# Patient Record
Sex: FEMALE | Race: BLACK OR AFRICAN AMERICAN | NOT HISPANIC OR LATINO | Employment: OTHER | URBAN - METROPOLITAN AREA
[De-identification: names, ages, dates, MRNs, and addresses within clinical notes are randomized per-mention and may not be internally consistent; named-entity substitution may affect disease eponyms.]

---

## 2017-04-07 ENCOUNTER — ALLSCRIPTS OFFICE VISIT (OUTPATIENT)
Dept: OTHER | Facility: OTHER | Age: 62
End: 2017-04-07

## 2018-01-14 VITALS
DIASTOLIC BLOOD PRESSURE: 88 MMHG | BODY MASS INDEX: 32.56 KG/M2 | HEART RATE: 93 BPM | WEIGHT: 178 LBS | SYSTOLIC BLOOD PRESSURE: 160 MMHG | TEMPERATURE: 97.4 F | OXYGEN SATURATION: 97 %

## 2018-02-02 DIAGNOSIS — G89.29 CHRONIC LOW BACK PAIN, UNSPECIFIED BACK PAIN LATERALITY, WITH SCIATICA PRESENCE UNSPECIFIED: Primary | ICD-10-CM

## 2018-02-02 DIAGNOSIS — M54.5 CHRONIC LOW BACK PAIN, UNSPECIFIED BACK PAIN LATERALITY, WITH SCIATICA PRESENCE UNSPECIFIED: Primary | ICD-10-CM

## 2018-02-02 RX ORDER — TRAMADOL HYDROCHLORIDE 50 MG/1
1 TABLET ORAL EVERY 6 HOURS
COMMUNITY
Start: 2014-08-04 | End: 2018-02-02 | Stop reason: SDUPTHER

## 2018-02-02 RX ORDER — TRAMADOL HYDROCHLORIDE 50 MG/1
50 TABLET ORAL EVERY 6 HOURS
Qty: 120 TABLET | Refills: 0 | OUTPATIENT
Start: 2018-02-02 | End: 2018-03-01 | Stop reason: SDUPTHER

## 2018-02-02 NOTE — TELEPHONE ENCOUNTER
Please advise her that Rx is ready  Please advise her that next visit we will need to have controlled substance agreement signed by her as well  Thanks

## 2018-03-01 DIAGNOSIS — G89.29 CHRONIC LOW BACK PAIN, UNSPECIFIED BACK PAIN LATERALITY, WITH SCIATICA PRESENCE UNSPECIFIED: ICD-10-CM

## 2018-03-01 DIAGNOSIS — M54.5 CHRONIC LOW BACK PAIN, UNSPECIFIED BACK PAIN LATERALITY, WITH SCIATICA PRESENCE UNSPECIFIED: ICD-10-CM

## 2018-03-05 RX ORDER — TRAMADOL HYDROCHLORIDE 50 MG/1
50 TABLET ORAL EVERY 6 HOURS
Qty: 120 TABLET | Refills: 0 | OUTPATIENT
Start: 2018-03-05 | End: 2018-04-02 | Stop reason: SDUPTHER

## 2018-03-05 NOTE — TELEPHONE ENCOUNTER
Rx called in and left message with a male at patient's home to inform patient that her Rx was called in

## 2018-03-22 ENCOUNTER — TELEPHONE (OUTPATIENT)
Dept: FAMILY MEDICINE CLINIC | Facility: OTHER | Age: 63
End: 2018-03-22

## 2018-03-29 DIAGNOSIS — I10 ESSENTIAL HYPERTENSION: Primary | ICD-10-CM

## 2018-04-02 DIAGNOSIS — I10 ESSENTIAL HYPERTENSION: Primary | ICD-10-CM

## 2018-04-02 DIAGNOSIS — G89.29 CHRONIC LOW BACK PAIN, UNSPECIFIED BACK PAIN LATERALITY, WITH SCIATICA PRESENCE UNSPECIFIED: ICD-10-CM

## 2018-04-02 DIAGNOSIS — M54.5 CHRONIC LOW BACK PAIN, UNSPECIFIED BACK PAIN LATERALITY, WITH SCIATICA PRESENCE UNSPECIFIED: ICD-10-CM

## 2018-04-02 RX ORDER — AMLODIPINE BESYLATE 10 MG/1
TABLET ORAL
Qty: 30 TABLET | Refills: 6 | Status: SHIPPED | OUTPATIENT
Start: 2018-04-02 | End: 2018-09-17 | Stop reason: SDUPTHER

## 2018-04-02 RX ORDER — AMLODIPINE BESYLATE 10 MG/1
1 TABLET ORAL DAILY
COMMUNITY
Start: 2012-03-30 | End: 2018-04-02 | Stop reason: SDUPTHER

## 2018-04-02 RX ORDER — AMLODIPINE BESYLATE 10 MG/1
10 TABLET ORAL DAILY
Qty: 30 TABLET | Refills: 0 | Status: SHIPPED | OUTPATIENT
Start: 2018-04-02 | End: 2018-04-02 | Stop reason: SDUPTHER

## 2018-04-02 NOTE — TELEPHONE ENCOUNTER
Please advise the patient I have refilled 30 days of Norvasc due to she needs blood work and office visit for routine follow up  Please set up a visit in the coming weeks for HTN follow up  Thanks

## 2018-04-03 RX ORDER — TRAMADOL HYDROCHLORIDE 50 MG/1
TABLET ORAL
Qty: 120 TABLET | Refills: 0 | OUTPATIENT
Start: 2018-04-03 | End: 2018-05-02 | Stop reason: SDUPTHER

## 2018-04-11 ENCOUNTER — OFFICE VISIT (OUTPATIENT)
Dept: FAMILY MEDICINE CLINIC | Facility: OTHER | Age: 63
End: 2018-04-11
Payer: COMMERCIAL

## 2018-04-11 VITALS
BODY MASS INDEX: 31.97 KG/M2 | SYSTOLIC BLOOD PRESSURE: 144 MMHG | OXYGEN SATURATION: 95 % | HEART RATE: 120 BPM | WEIGHT: 169.31 LBS | DIASTOLIC BLOOD PRESSURE: 88 MMHG | TEMPERATURE: 98.7 F | HEIGHT: 61 IN

## 2018-04-11 DIAGNOSIS — M54.16 LUMBAR RADICULOPATHY: ICD-10-CM

## 2018-04-11 DIAGNOSIS — G89.29 CHRONIC MIDLINE LOW BACK PAIN WITH LEFT-SIDED SCIATICA: ICD-10-CM

## 2018-04-11 DIAGNOSIS — M51.36 DISC DEGENERATION, LUMBAR: ICD-10-CM

## 2018-04-11 DIAGNOSIS — Z12.11 SCREENING FOR COLON CANCER: Primary | ICD-10-CM

## 2018-04-11 DIAGNOSIS — I10 BENIGN ESSENTIAL HYPERTENSION: ICD-10-CM

## 2018-04-11 DIAGNOSIS — M54.42 CHRONIC MIDLINE LOW BACK PAIN WITH LEFT-SIDED SCIATICA: ICD-10-CM

## 2018-04-11 DIAGNOSIS — F32.A ANXIETY AND DEPRESSION: ICD-10-CM

## 2018-04-11 DIAGNOSIS — F41.9 ANXIETY AND DEPRESSION: ICD-10-CM

## 2018-04-11 PROCEDURE — 99214 OFFICE O/P EST MOD 30 MIN: CPT | Performed by: FAMILY MEDICINE

## 2018-04-11 RX ORDER — LISINOPRIL 20 MG/1
1 TABLET ORAL 2 TIMES DAILY
COMMUNITY
Start: 2011-07-08 | End: 2018-05-02 | Stop reason: SDUPTHER

## 2018-04-11 RX ORDER — DULOXETIN HYDROCHLORIDE 30 MG/1
30 CAPSULE, DELAYED RELEASE ORAL DAILY
Qty: 30 CAPSULE | Refills: 0 | Status: SHIPPED | OUTPATIENT
Start: 2018-04-11 | End: 2018-04-11 | Stop reason: SDUPTHER

## 2018-04-11 RX ORDER — DULOXETIN HYDROCHLORIDE 30 MG/1
30 CAPSULE, DELAYED RELEASE ORAL DAILY
Qty: 30 CAPSULE | Refills: 1 | Status: SHIPPED | OUTPATIENT
Start: 2018-04-11 | End: 2018-07-11

## 2018-04-11 RX ORDER — ALPRAZOLAM 1 MG/1
1 TABLET ORAL EVERY 8 HOURS PRN
COMMUNITY
Start: 2011-06-10 | End: 2018-07-11

## 2018-04-11 RX ORDER — GABAPENTIN 300 MG/1
1 CAPSULE ORAL 3 TIMES DAILY
COMMUNITY
End: 2020-04-08

## 2018-04-11 NOTE — PATIENT INSTRUCTIONS
DASH Eating Plan   AMBULATORY CARE:   The DASH (Dietary Approaches to Stop Hypertension) Eating Plan  is designed to help prevent or lower high blood pressure  It can also help to lower LDL (bad) cholesterol and decrease your risk for heart disease  The plan is low in sodium, sugar, unhealthy fats, and total fat  It is high in potassium, calcium, magnesium, and fiber  These nutrients are added when you eat more fruits, vegetables, and whole grains  Your sodium limit each day: Your dietitian will tell you how much sodium is safe for you to have each day  People with high blood pressure should have no more than 1,500 to 2,300 mg of sodium in a day  A teaspoon (tsp) of salt has 2,300 mg of sodium  This may seem like a difficult goal, but small changes to the foods you eat can make a big difference  Your healthcare provider or dietitian can help you create a meal plan that follows your sodium limit  How to limit sodium:   · Read food labels  Food labels can help you choose foods that are low in sodium  The amount of sodium is listed in milligrams (mg)  The % Daily Value (DV) column tells you how much of your daily needs are met by 1 serving of the food for each nutrient listed  Choose foods that have less than 5% of the DV of sodium  These foods are considered low in sodium  Foods that have 20% or more of the DV of sodium are considered high in sodium  Avoid foods that have more than 300 mg of sodium in each serving  Choose foods that say low-sodium, reduced-sodium, or no salt added on the food label  · Avoid salt  Do not salt food at the table, and add very little salt to foods during cooking  Use herbs and spices, such as onions, garlic, and salt-free seasonings to add flavor to foods  Try lemon or lime juice or vinegar to give foods a tart flavor  Use hot peppers or a small amount of hot pepper sauce to add a spicy flavor to foods  · Ask about salt substitutes    Ask your healthcare provider if you may use salt substitutes  Some salt substitutes have ingredients that can be harmful if you have certain health conditions  · Choose foods carefully at restaurants  Meals from restaurants, especially fast food restaurants, are often high in sodium  Some restaurants have nutrition information that tells you the amount of sodium in their foods  Ask to have your food prepared with less, or no salt  What you need to know about fats:   · Include healthy fats  Examples are unsaturated fats and omega-3 fatty acids  Unsaturated fats are found in soybean, canola, olive, or sunflower oil, and liquid and soft tub margarines  Omega-3 fatty acids are found in fatty fish, such as salmon, tuna, mackerel, and sardines  It is also found in flaxseed oil and ground flaxseed  · Avoid unhealthy fats  Do not eat unhealthy fats, such as saturated fats and trans fats  Saturated fats are found in foods that contain fat from animals  Examples are fatty meats, whole milk, butter, cream, and other dairy foods  It is also found in shortening, stick margarine, palm oil, and coconut oil  Trans fats are found in fried foods, crackers, chips, and baked goods made with margarine or shortening  Foods to include: With the DASH eating plan, you need to eat a certain number of servings from each food group  This will help you get enough of certain nutrients and limit others  The amount of servings you should eat depends on how many calories you need  Your dietitian can tell you how many calories you need  The number of servings listed next to the food groups below are for people who need about 2,000 calories each day    · Grains:  6 to 8 servings (3 of these servings should be whole-grain foods)    ¨ 1 slice of whole-grain bread     ¨ 1 ounce of dry cereal    ¨ ½ cup of cooked cereal, pasta, or brown rice    · Vegetables and fruits:  4 to 5 servings of fruits and 4 to 5 servings of vegetables    ¨ 1 medium fruit    ¨ ½ cup of frozen, canned (no added salt), or chopped fresh vegetables     ¨ ½ cup of fresh, frozen, dried, or canned fruit (canned in light syrup or fruit juice)    ¨ ½ cup of vegetable or fruit juice    · Dairy:  2 to 3 servings    ¨ 1 cup of nonfat (skim) or 1% milk    ¨ 1½ ounces of fat-free or low-fat cheese    ¨ 6 ounces of nonfat or low-fat yogurt    · Lean meat, poultry, and fish:  6 ounces or less    [employer] (chicken, turkey) with no skin    ¨ Fish (especially fatty fish, such as salmon, fresh tuna, or mackerel)    ¨ Lean beef and pork (loin, round, extra lean hamburger)    ¨ Egg whites and egg substitutes    · Nuts, seeds, and legumes:  4 to 5 servings each week    ¨ ½ cup of cooked beans and peas    ¨ 1½ ounces of unsalted nuts    ¨ 2 tablespoons of peanut butter or seeds    · Sweets and added sugars:  5 or less each week    ¨ 1 tablespoon of sugar, jelly, or jam    ¨ ½ cup of sorbet or gelatin    ¨ 1 cup of lemonade    · Fats:  2 to 3 servings each week    ¨ 1 teaspoon of soft margarine or vegetable oil    ¨ 1 tablespoon of mayonnaise    ¨ 2 tablespoons of salad dressing  Foods to avoid:   · Grains:      [employer], such as doughnuts, pastries, cookies, and biscuits (high in fat and sugar)    ¨ Mixes for cornbread and biscuits, packaged foods, such as bread stuffing, rice and pasta mixes, macaroni and cheese, and instant cereals (high in sodium)    · Fruits and vegetables:      ¨ Regular, canned vegetables (high in sodium)    ¨ Sauerkraut, pickled vegetables, and other foods prepared in brine (high in sodium)    ¨ Fried vegetables or vegetables in butter or high-fat sauces    ¨ Fruit in cream or butter sauce (high in fat)    · Dairy:      ¨ Whole milk, 2% milk, and cream (high in fat)    ¨ Regular cheese and processed cheese (high in fat and sodium)    · Meats and protein foods:      ¨ Smoked or cured meat, such as corned beef, cooper, ham, hot dogs, and sausage (high in fat and sodium)    ¨ Canned beans and canned meats or spreads, such as potted meats, sardines, anchovies, and imitation seafood (high in sodium)    ¨ Deli or lunch meats, such as bologna, ham, turkey, and roast beef (high in sodium)    ¨ High-fat meat (T-bone steak, regular hamburger, and ribs)    ¨ Whole eggs and egg yolks (high in fat)    · Other:      ¨ Seasonings made with salt, such as garlic salt, celery salt, onion salt, seasoned salt, meat tenderizers, and monosodium glutamate (MSG)    ¨ Miso soup and canned or dried soup mixes (high in sodium)    ¨ Regular soy sauce, barbecue sauce, teriyaki sauce, steak sauce, Worcestershire sauce, and most flavored vinegars (high in sodium)    ¨ Regular condiments, such as mustard, ketchup, and salad dressings (high in sodium)    ¨ Gravy and sauces, such as Gregory or cheese sauces (high in sodium and fat)    ¨ Drinks high in sugar, such as soda or fruit drinks    [employer] foods, such as salted chips, popcorn, pretzels, pork rinds, salted crackers, and salted nuts    ¨ Frozen foods, such as dinners, entrees, vegetables with sauces, and breaded meats (high in sodium)  Other guidelines to follow:   · Maintain a healthy weight  Your risk for heart disease is higher if you are overweight  Your healthcare provider may suggest that you lose weight if you are overweight  You can lose weight by eating fewer calories and foods that have added sugars and fat  The DASH meal plan can help you do this  Decrease calories by eating smaller portions at each meal and fewer snacks  Ask your healthcare provider for more information about how to lose weight  · Exercise regularly  Regular exercise can help you reach or maintain a healthy weight  Regular exercise can also help decrease your blood pressure and improve your cholesterol levels  Get 30 minutes or more of moderate exercise each day of the week  To lose weight, get at least 60 minutes of exercise  Talk to your healthcare provider about the best exercise program for you      · Limit alcohol  Women should limit alcohol to 1 drink a day  Men should limit alcohol to 2 drinks a day  A drink of alcohol is 12 ounces of beer, 5 ounces of wine, or 1½ ounces of liquor  © 2017 [address] [employer] Information is for End User's use only and may not be sold, redistributed or otherwise used for commercial purposes  All illustrations and images included in CareNotes® are the copyrighted property of [employer]  or [employer]  The above information is an  only  It is not intended as medical advice for individual conditions or treatments  Talk to your doctor, nurse or pharmacist before following any medical regimen to see if it is safe and effective for you

## 2018-04-11 NOTE — PROGRESS NOTES
Assessment/Plan:           Problem List Items Addressed This Visit     Backache    Relevant Orders    MRI lumbar spine wo contrast    CBC and differential    Comprehensive metabolic panel    TSH, 3rd generation with T4 reflex    Vitamin D 25 hydroxy    Benign essential hypertension    Relevant Medications    lisinopril (ZESTRIL) 20 mg tablet    diltiazem (CARDIZEM LA) 120 MG 24 hr tablet    Other Relevant Orders    CBC and differential    Comprehensive metabolic panel    TSH, 3rd generation with T4 reflex    Vitamin D 25 hydroxy    Disc degeneration, lumbar    Relevant Orders    MRI lumbar spine wo contrast    Lumbar radiculopathy    Relevant Orders    MRI lumbar spine wo contrast      Other Visit Diagnoses     Screening for colon cancer    -  Primary    Relevant Orders    Ambulatory referral to Gastroenterology    Anxiety and depression        Relevant Medications    ALPRAZolam (XANAX) 1 mg tablet    DULoxetine (CYMBALTA) 30 mg delayed release capsule    Other Relevant Orders    CBC and differential    Comprehensive metabolic panel    TSH, 3rd generation with T4 reflex    Vitamin D 25 hydroxy            Subjective:      Patient ID: Kali Mcclain is a 57 y o  female  Patient is here for eval of the chronic pain in the lower back  Hypertension   Patient is a 57 y o  female who presents for follow-up of hypertension  Her high blood pressure was first noted several years ago  Home blood pressure readings: not doing  Salt intake and diet: salt not added to cooking  Usual weight: stable  Associated signs and symptoms: none  Patient denies: blurred vision, chest pain, dyspnea and headache  Use of agents associated with hypertension: none  Medication compliance: taking as prescribed  Chronic low back pain due to DDD:  She has had the imaging studies several years ago and feels the back pain is much worse now    She feels very sad and tearful and states taking all the pain medication she still has days when she can't do much and feels very sad  She has been following with pain management regularly  She is asking to have a repeat MRI for sure  She just wants to know if there is any changes to her lower back  Other issue is depression associated with the chronic pain  She is very tearful and is asking if there is any medication that will help her  She feels very down and tearful but not suicidal   No thoughts on hurting herself or others  She does have family and they are supportive  She is not taking any medication currently for depression  We discussed about options and she agrees to start Cymbalta for a trial   She has anxiety as well and takes Alprazolam and wants to continue with this medication  We had her sign the agreement for the medication today and she fully agreed with the agreement terms  The following portions of the patient's history were reviewed and updated as appropriate: allergies, current medications, past family history, past medical history, past social history, past surgical history and problem list     Review of Systems   Constitutional: Positive for fatigue  Negative for appetite change, chills, fever and unexpected weight change  Respiratory: Negative for cough  Cardiovascular: Negative for chest pain and leg swelling  Gastrointestinal: Negative for abdominal pain, nausea and vomiting  Genitourinary: Negative for dysuria and flank pain  Musculoskeletal: Positive for arthralgias, back pain and myalgias  Neurological: Positive for numbness  Negative for dizziness, weakness and light-headedness  Psychiatric/Behavioral: Positive for decreased concentration and dysphoric mood  Negative for self-injury and suicidal ideas  The patient is nervous/anxious            Objective:      /88 (BP Location: Left arm, Patient Position: Sitting, Cuff Size: Adult)   Pulse (!) 120   Temp 98 7 °F (37 1 °C) (Tympanic)   Ht 5' 0 5" (1 537 m)   Wt 76 8 kg (169 lb 5 oz) SpO2 95%   BMI 32 52 kg/m²          Physical Exam   Constitutional: She is oriented to person, place, and time  She appears well-developed and well-nourished  No distress  HENT:   Head: Normocephalic and atraumatic  Eyes: Conjunctivae are normal  Right eye exhibits no discharge  Left eye exhibits no discharge  No scleral icterus  Neck: Normal range of motion  Neck supple  No thyromegaly present  Cardiovascular: Normal rate, regular rhythm and normal heart sounds  No murmur heard  Pulmonary/Chest: Effort normal and breath sounds normal  No respiratory distress  She has no wheezes  Abdominal: Soft  Bowel sounds are normal  She exhibits no distension  There is no tenderness  Lymphadenopathy:     She has no cervical adenopathy  Neurological: She is alert and oriented to person, place, and time  No cranial nerve deficit  Skin: Skin is warm and dry  No rash noted  She is not diaphoretic  Psychiatric: Her behavior is normal    Mood is tearful and sad    Nursing note and vitals reviewed        Lab Results   Component Value Date    WBC 6 45 09/30/2015    HGB 13 0 09/30/2015    HCT 40 0 09/30/2015     09/30/2015    CHOL 199 09/30/2015    TRIG 83 09/30/2015    HDL 63 09/30/2015    ALT 22 09/30/2015    AST 13 09/30/2015     09/30/2015    K 4 4 09/30/2015     (H) 09/30/2015    CREATININE 1 06 09/30/2015    BUN 14 09/30/2015    CO2 27 0 09/30/2015

## 2018-04-16 ENCOUNTER — TELEPHONE (OUTPATIENT)
Dept: FAMILY MEDICINE CLINIC | Facility: OTHER | Age: 63
End: 2018-04-16

## 2018-04-16 NOTE — TELEPHONE ENCOUNTER
----- Message from Tete Bowens MD sent at 4/13/2018 11:38 AM EDT -----  Please have patient return for blood work  The lab is ordered as part of her visit  Thanks  She can do this lab non-fasting if she wants

## 2018-05-02 DIAGNOSIS — G89.29 CHRONIC LOW BACK PAIN, UNSPECIFIED BACK PAIN LATERALITY, WITH SCIATICA PRESENCE UNSPECIFIED: ICD-10-CM

## 2018-05-02 DIAGNOSIS — M54.5 CHRONIC LOW BACK PAIN, UNSPECIFIED BACK PAIN LATERALITY, WITH SCIATICA PRESENCE UNSPECIFIED: ICD-10-CM

## 2018-05-02 DIAGNOSIS — I10 ESSENTIAL HYPERTENSION: Primary | ICD-10-CM

## 2018-05-02 RX ORDER — TRAMADOL HYDROCHLORIDE 50 MG/1
TABLET ORAL
Qty: 120 TABLET | Refills: 0 | OUTPATIENT
Start: 2018-05-02 | End: 2018-05-31 | Stop reason: SDUPTHER

## 2018-05-02 RX ORDER — LISINOPRIL 20 MG/1
TABLET ORAL
Qty: 60 TABLET | Refills: 1 | Status: SHIPPED | OUTPATIENT
Start: 2018-05-02 | End: 2018-05-17 | Stop reason: SDUPTHER

## 2018-05-10 ENCOUNTER — TELEPHONE (OUTPATIENT)
Dept: GASTROENTEROLOGY | Facility: AMBULARY SURGERY CENTER | Age: 63
End: 2018-05-10

## 2018-05-10 NOTE — TELEPHONE ENCOUNTER
called patient  Telephone number is now saying it is non exsistent    I have sent patient a letter today requesting she calls in to make an appointment

## 2018-05-17 DIAGNOSIS — I10 ESSENTIAL HYPERTENSION: ICD-10-CM

## 2018-05-17 RX ORDER — LISINOPRIL 20 MG/1
TABLET ORAL
Qty: 60 TABLET | Refills: 6 | Status: SHIPPED | OUTPATIENT
Start: 2018-05-17 | End: 2018-09-17

## 2018-05-31 DIAGNOSIS — M54.5 CHRONIC LOW BACK PAIN, UNSPECIFIED BACK PAIN LATERALITY, WITH SCIATICA PRESENCE UNSPECIFIED: ICD-10-CM

## 2018-05-31 DIAGNOSIS — G89.29 CHRONIC LOW BACK PAIN, UNSPECIFIED BACK PAIN LATERALITY, WITH SCIATICA PRESENCE UNSPECIFIED: ICD-10-CM

## 2018-05-31 RX ORDER — TRAMADOL HYDROCHLORIDE 50 MG/1
TABLET ORAL
Qty: 120 TABLET | Refills: 0 | OUTPATIENT
Start: 2018-05-31 | End: 2018-06-29 | Stop reason: SDUPTHER

## 2018-06-29 DIAGNOSIS — M54.5 CHRONIC LOW BACK PAIN, UNSPECIFIED BACK PAIN LATERALITY, WITH SCIATICA PRESENCE UNSPECIFIED: ICD-10-CM

## 2018-06-29 DIAGNOSIS — G89.29 CHRONIC LOW BACK PAIN, UNSPECIFIED BACK PAIN LATERALITY, WITH SCIATICA PRESENCE UNSPECIFIED: ICD-10-CM

## 2018-07-02 DIAGNOSIS — G89.29 CHRONIC LOW BACK PAIN, UNSPECIFIED BACK PAIN LATERALITY, WITH SCIATICA PRESENCE UNSPECIFIED: ICD-10-CM

## 2018-07-02 DIAGNOSIS — M54.5 CHRONIC LOW BACK PAIN, UNSPECIFIED BACK PAIN LATERALITY, WITH SCIATICA PRESENCE UNSPECIFIED: ICD-10-CM

## 2018-07-02 RX ORDER — TRAMADOL HYDROCHLORIDE 50 MG/1
TABLET ORAL
Qty: 120 TABLET | Refills: 0 | OUTPATIENT
Start: 2018-07-02 | End: 2018-07-30 | Stop reason: SDUPTHER

## 2018-07-02 RX ORDER — TRAMADOL HYDROCHLORIDE 50 MG/1
TABLET ORAL
Qty: 120 TABLET | Refills: 0 | OUTPATIENT
Start: 2018-07-02

## 2018-07-11 ENCOUNTER — OFFICE VISIT (OUTPATIENT)
Dept: FAMILY MEDICINE CLINIC | Facility: OTHER | Age: 63
End: 2018-07-11
Payer: COMMERCIAL

## 2018-07-11 VITALS
HEIGHT: 61 IN | OXYGEN SATURATION: 96 % | WEIGHT: 164.5 LBS | SYSTOLIC BLOOD PRESSURE: 136 MMHG | DIASTOLIC BLOOD PRESSURE: 88 MMHG | HEART RATE: 96 BPM | BODY MASS INDEX: 31.06 KG/M2 | TEMPERATURE: 97 F

## 2018-07-11 DIAGNOSIS — F30.9 BIPOLAR I DISORDER, SINGLE MANIC EPISODE (HCC): ICD-10-CM

## 2018-07-11 DIAGNOSIS — Z12.11 SCREENING FOR COLON CANCER: ICD-10-CM

## 2018-07-11 DIAGNOSIS — B35.4 TINEA CORPORIS: ICD-10-CM

## 2018-07-11 DIAGNOSIS — I10 BENIGN ESSENTIAL HYPERTENSION: Primary | ICD-10-CM

## 2018-07-11 DIAGNOSIS — Z12.39 BREAST CANCER SCREENING: ICD-10-CM

## 2018-07-11 DIAGNOSIS — E78.2 MIXED HYPERLIPIDEMIA: ICD-10-CM

## 2018-07-11 PROCEDURE — 99214 OFFICE O/P EST MOD 30 MIN: CPT | Performed by: FAMILY MEDICINE

## 2018-07-11 PROCEDURE — 3075F SYST BP GE 130 - 139MM HG: CPT | Performed by: FAMILY MEDICINE

## 2018-07-11 PROCEDURE — 3079F DIAST BP 80-89 MM HG: CPT | Performed by: FAMILY MEDICINE

## 2018-07-11 RX ORDER — ALPRAZOLAM 0.25 MG/1
0.25 TABLET ORAL 3 TIMES DAILY PRN
Refills: 0 | COMMUNITY
Start: 2018-06-22 | End: 2019-10-16

## 2018-07-11 RX ORDER — KETOCONAZOLE 20 MG/G
CREAM TOPICAL 2 TIMES DAILY
Qty: 15 G | Refills: 0 | Status: SHIPPED | OUTPATIENT
Start: 2018-07-11 | End: 2019-10-16

## 2018-07-12 ENCOUNTER — APPOINTMENT (OUTPATIENT)
Dept: LAB | Facility: CLINIC | Age: 63
End: 2018-07-12
Payer: COMMERCIAL

## 2018-07-12 DIAGNOSIS — E78.2 MIXED HYPERLIPIDEMIA: ICD-10-CM

## 2018-07-12 DIAGNOSIS — I10 BENIGN ESSENTIAL HYPERTENSION: ICD-10-CM

## 2018-07-12 LAB
ALBUMIN SERPL BCP-MCNC: 3.5 G/DL (ref 3.5–5)
ALP SERPL-CCNC: 84 U/L (ref 46–116)
ALT SERPL W P-5'-P-CCNC: 16 U/L (ref 12–78)
ANION GAP SERPL CALCULATED.3IONS-SCNC: 9 MMOL/L (ref 4–13)
AST SERPL W P-5'-P-CCNC: 13 U/L (ref 5–45)
BILIRUB SERPL-MCNC: 0.2 MG/DL (ref 0.2–1)
BUN SERPL-MCNC: 16 MG/DL (ref 5–25)
CALCIUM SERPL-MCNC: 9.8 MG/DL (ref 8.3–10.1)
CHLORIDE SERPL-SCNC: 104 MMOL/L (ref 100–108)
CHOLEST SERPL-MCNC: 197 MG/DL (ref 50–200)
CO2 SERPL-SCNC: 27 MMOL/L (ref 21–32)
CREAT SERPL-MCNC: 1.28 MG/DL (ref 0.6–1.3)
ERYTHROCYTE [DISTWIDTH] IN BLOOD BY AUTOMATED COUNT: 16 % (ref 11.6–15.1)
GFR SERPL CREATININE-BSD FRML MDRD: 51 ML/MIN/1.73SQ M
GLUCOSE P FAST SERPL-MCNC: 98 MG/DL (ref 65–99)
HCT VFR BLD AUTO: 41.9 % (ref 34.8–46.1)
HDLC SERPL-MCNC: 51 MG/DL (ref 40–60)
HGB BLD-MCNC: 13.3 G/DL (ref 11.5–15.4)
LDLC SERPL CALC-MCNC: 129 MG/DL (ref 0–100)
MCH RBC QN AUTO: 29.9 PG (ref 26.8–34.3)
MCHC RBC AUTO-ENTMCNC: 31.7 G/DL (ref 31.4–37.4)
MCV RBC AUTO: 94 FL (ref 82–98)
NONHDLC SERPL-MCNC: 146 MG/DL
PLATELET # BLD AUTO: 310 THOUSANDS/UL (ref 149–390)
PMV BLD AUTO: 9.9 FL (ref 8.9–12.7)
POTASSIUM SERPL-SCNC: 4.3 MMOL/L (ref 3.5–5.3)
PROT SERPL-MCNC: 9.1 G/DL (ref 6.4–8.2)
RBC # BLD AUTO: 4.45 MILLION/UL (ref 3.81–5.12)
SODIUM SERPL-SCNC: 140 MMOL/L (ref 136–145)
TRIGL SERPL-MCNC: 87 MG/DL
WBC # BLD AUTO: 9.39 THOUSAND/UL (ref 4.31–10.16)

## 2018-07-12 PROCEDURE — 80053 COMPREHEN METABOLIC PANEL: CPT

## 2018-07-12 PROCEDURE — 80061 LIPID PANEL: CPT

## 2018-07-12 PROCEDURE — 85027 COMPLETE CBC AUTOMATED: CPT

## 2018-07-12 PROCEDURE — 36415 COLL VENOUS BLD VENIPUNCTURE: CPT

## 2018-07-12 NOTE — PROGRESS NOTES
Subjective:      Patient ID: Mary Lawrence is a 57 y o  female  Rash   This is a recurrent problem  The current episode started 1 to 4 weeks ago  The problem is unchanged  The affected locations include the right lower leg  The rash is characterized by redness, itchiness and scaling  She was exposed to nothing  Pertinent negatives include no anorexia, congestion, cough, diarrhea, eye pain, facial edema, fatigue, fever, joint pain, nail changes, rhinorrhea, shortness of breath, sore throat or vomiting  Past treatments include nothing  There is no history of allergies, asthma or eczema  Hypertension   This is a chronic problem  The current episode started more than 1 year ago  The problem is controlled  Associated symptoms include anxiety  Pertinent negatives include no blurred vision, chest pain, headaches, malaise/fatigue, neck pain, orthopnea, palpitations, peripheral edema, PND, shortness of breath or sweats  There are no associated agents to hypertension  Risk factors for coronary artery disease include dyslipidemia, post-menopausal state, obesity, sedentary lifestyle and stress  Past treatments include calcium channel blockers and ACE inhibitors  The current treatment provides moderate improvement  Compliance problems include psychosocial issues and exercise  There is no history of angina, kidney disease, CAD/MI, CVA, heart failure, left ventricular hypertrophy, PVD or retinopathy  There is no history of chronic renal disease, a hypertension causing med or a thyroid problem  Hyperlipidemia   This is a chronic problem  The current episode started more than 1 year ago  The problem is controlled  Exacerbating diseases include obesity  She has no history of chronic renal disease, diabetes, hypothyroidism, liver disease or nephrotic syndrome  There are no known factors aggravating her hyperlipidemia   Pertinent negatives include no chest pain, focal sensory loss, focal weakness, leg pain, myalgias or shortness of breath  Current antihyperlipidemic treatment includes diet change  The current treatment provides mild improvement of lipids  Compliance problems include adherence to exercise and psychosocial issues  Risk factors for coronary artery disease include dyslipidemia, hypertension, obesity, a sedentary lifestyle, stress and post-menopausal        The following portions of the patient's history were reviewed and updated as appropriate: allergies, current medications, past family history, past medical history, past social history, past surgical history and problem list       Current Outpatient Prescriptions:     ALPRAZolam (XANAX) 0 25 mg tablet, Take 0 25 mg by mouth 3 (three) times a day as needed, Disp: , Rfl: 0    amLODIPine (NORVASC) 10 mg tablet, TAKE 1 TABLET BY MOUTH DAILY, Disp: 30 tablet, Rfl: 6    gabapentin (NEURONTIN) 300 mg capsule, Take 1 capsule by mouth 2 (two) times a day, Disp: , Rfl:     lisinopril (ZESTRIL) 20 mg tablet, TAKE 1 TABLET BY MOUTH TWICE A DAY, Disp: 60 tablet, Rfl: 6    traMADol (ULTRAM) 50 mg tablet, Take one tablet 4 times daily as needed, Disp: 120 tablet, Rfl: 0    diltiazem (CARDIZEM LA) 120 MG 24 hr tablet, Take 1 tablet (120 mg total) by mouth daily, Disp: 30 tablet, Rfl: 3    ketoconazole (NIZORAL) 2 % cream, Apply topically 2 (two) times a day, Disp: 15 g, Rfl: 0         Review of Systems   Constitutional: Negative for activity change, fatigue, fever and malaise/fatigue  HENT: Negative for congestion, ear pain, rhinorrhea, sinus pain and sore throat  Eyes: Negative for blurred vision, pain and itching  Respiratory: Negative for cough and shortness of breath  Cardiovascular: Negative for chest pain, palpitations, orthopnea and PND  Gastrointestinal: Negative for abdominal pain, anorexia, constipation, diarrhea, nausea and vomiting  Endocrine: Negative for cold intolerance and heat intolerance  Genitourinary: Negative for dysuria     Musculoskeletal: Negative for joint pain, myalgias and neck pain  Skin: Positive for rash  Negative for color change and nail changes  Neurological: Negative for dizziness, focal weakness, syncope and headaches  Hematological: Negative for adenopathy  Psychiatric/Behavioral: Positive for decreased concentration and dysphoric mood  Negative for behavioral problems, confusion, hallucinations, self-injury, sleep disturbance and suicidal ideas  The patient is nervous/anxious  Objective:      /88 (BP Location: Left arm, Patient Position: Sitting, Cuff Size: Adult)   Pulse 96   Temp (!) 97 °F (36 1 °C) (Tympanic)   Ht 5' 0 5" (1 537 m)   Wt 74 6 kg (164 lb 8 oz)   SpO2 96%   BMI 31 60 kg/m²          Physical Exam   Constitutional: She is oriented to person, place, and time  She appears well-developed and well-nourished  No distress  HENT:   Head: Normocephalic and atraumatic  Right Ear: External ear normal    Left Ear: External ear normal    Nose: Nose normal    Mouth/Throat: Oropharynx is clear and moist    Eyes: Conjunctivae and EOM are normal  Pupils are equal, round, and reactive to light  No scleral icterus  Neck: Normal range of motion  Neck supple  No thyromegaly present  Cardiovascular: Normal rate, regular rhythm and normal heart sounds  Pulmonary/Chest: Effort normal and breath sounds normal  No respiratory distress  Abdominal: Soft  Bowel sounds are normal  There is no tenderness  Musculoskeletal: Normal range of motion  She exhibits no edema  Lymphadenopathy:     She has no cervical adenopathy  Neurological: She is alert and oriented to person, place, and time  No cranial nerve deficit  Skin: Skin is warm and dry  Rash noted  Rash is maculopapular  Rash is not nodular, not pustular and not vesicular  Psychiatric: Her behavior is normal  Judgment normal  Her mood appears anxious  Cognition and memory are normal  She expresses no homicidal and no suicidal ideation  Assessment/Plan:   Diagnoses and all orders for this visit:    Benign essential hypertension  -     diltiazem (CARDIZEM LA) 120 MG 24 hr tablet; Take 1 tablet (120 mg total) by mouth daily  -     Comprehensive metabolic panel; Future  -     Lipid panel; Future  -     CBC; Future    Mixed hyperlipidemia  -     Comprehensive metabolic panel; Future  -     Lipid panel; Future  -     CBC; Future    Bipolar I disorder, single manic episode ([facility] [address] )        -     Not well controlled, unable to tolerate meds        -     Recommend eval with Behavioral Health -- try to set up for appt with Bryant Astorga     Breast cancer screening  -     Mammo screening bilateral w cad; Future    Screening for colon cancer  -     Ambulatory referral to Gastroenterology; Future    Tinea corporis  -     ketoconazole (NIZORAL) 2 % cream; Apply topically 2 (two) times a day    Other orders  -     ALPRAZolam (XANAX) 0 25 mg tablet; Take 0 25 mg by mouth 3 (three) times a day as needed          Stable appearing 58-year-old female with history of hypertension, hyperlipidemia, and bipolar disorder presents for follow-up of chronic conditions  Bipolar disorder not well controlled at this time--will refer for in office behavioral health evaluation later this month  Patient is to continue seeing her regular psychiatrist for medication adjustments  Hypertension and hyperlipidemia appear to be stable at this time  Routine blood work ordered ahead of next visit  No medication changes made today  Rash on right lower extremity appears to be consistent with tinea corporis, therefore will a empirically treat with course of topical ketoconazole cream     RTO approximately 3 months for complete physical exam including Pap smear  The patient indicates understanding of these issues and agrees with the plan        Charity Baez, DO

## 2018-07-16 ENCOUNTER — TELEPHONE (OUTPATIENT)
Dept: FAMILY MEDICINE CLINIC | Facility: OTHER | Age: 63
End: 2018-07-16

## 2018-07-18 ENCOUNTER — TELEPHONE (OUTPATIENT)
Dept: FAMILY MEDICINE CLINIC | Facility: OTHER | Age: 63
End: 2018-07-18

## 2018-07-18 DIAGNOSIS — M54.5 LOW BACK PAIN, UNSPECIFIED BACK PAIN LATERALITY, UNSPECIFIED CHRONICITY, WITH SCIATICA PRESENCE UNSPECIFIED: Primary | ICD-10-CM

## 2018-07-18 NOTE — TELEPHONE ENCOUNTER
[facility] called and lidocaine is not covered by insurance, Would like to know if it can be changed to Diclofenac 1 5% sol   Please Advise

## 2018-07-19 ENCOUNTER — TELEPHONE (OUTPATIENT)
Dept: FAMILY MEDICINE CLINIC | Facility: OTHER | Age: 63
End: 2018-07-19

## 2018-07-19 NOTE — TELEPHONE ENCOUNTER
Pt notified     ----- Message from John Gu DO sent at 7/19/2018  2:48 PM EDT -----  Please inform pt that labs are stable -- can review in detail at her next appt    Thanks!   Jonh Gu DO

## 2018-07-30 DIAGNOSIS — G89.29 CHRONIC LOW BACK PAIN, UNSPECIFIED BACK PAIN LATERALITY, WITH SCIATICA PRESENCE UNSPECIFIED: ICD-10-CM

## 2018-07-30 DIAGNOSIS — M54.5 CHRONIC LOW BACK PAIN, UNSPECIFIED BACK PAIN LATERALITY, WITH SCIATICA PRESENCE UNSPECIFIED: ICD-10-CM

## 2018-07-30 RX ORDER — TRAMADOL HYDROCHLORIDE 50 MG/1
TABLET ORAL
Qty: 120 TABLET | Refills: 0 | OUTPATIENT
Start: 2018-07-30 | End: 2018-08-27 | Stop reason: SDUPTHER

## 2018-08-27 DIAGNOSIS — M54.5 CHRONIC LOW BACK PAIN, UNSPECIFIED BACK PAIN LATERALITY, WITH SCIATICA PRESENCE UNSPECIFIED: ICD-10-CM

## 2018-08-27 DIAGNOSIS — G89.29 CHRONIC LOW BACK PAIN, UNSPECIFIED BACK PAIN LATERALITY, WITH SCIATICA PRESENCE UNSPECIFIED: ICD-10-CM

## 2018-08-29 RX ORDER — TRAMADOL HYDROCHLORIDE 50 MG/1
TABLET ORAL
Qty: 120 TABLET | Refills: 0 | Status: SHIPPED | OUTPATIENT
Start: 2018-08-29 | End: 2019-10-16

## 2018-09-14 ENCOUNTER — TELEPHONE (OUTPATIENT)
Dept: FAMILY MEDICINE CLINIC | Facility: OTHER | Age: 63
End: 2018-09-14

## 2018-09-14 NOTE — TELEPHONE ENCOUNTER
Patient called today very upset  She  cannot get into her psychiatrist until Sept 24  She ran out of Alprazalom and Gabapentin after today  She states that she has been calling the psychiatrist (Dr Quigley Grams left several messages and none is returning her calls to fill the prescription She wants to come in to see Dr Estuardo Lechuga next week just to talk until she can get into see Dr Alexandra Toro    I also told her that she needs to see Jaswinder Kaur and that she has missed her apt on 8/7 and that I can make appointment with her but she does not seem to want to make appointment

## 2018-09-17 ENCOUNTER — OFFICE VISIT (OUTPATIENT)
Dept: FAMILY MEDICINE CLINIC | Facility: OTHER | Age: 63
End: 2018-09-17
Payer: COMMERCIAL

## 2018-09-17 VITALS
HEART RATE: 91 BPM | WEIGHT: 161.31 LBS | DIASTOLIC BLOOD PRESSURE: 92 MMHG | BODY MASS INDEX: 30.46 KG/M2 | HEIGHT: 61 IN | OXYGEN SATURATION: 98 % | TEMPERATURE: 100.1 F | SYSTOLIC BLOOD PRESSURE: 168 MMHG

## 2018-09-17 DIAGNOSIS — G89.29 CHRONIC LOW BACK PAIN, UNSPECIFIED BACK PAIN LATERALITY, WITH SCIATICA PRESENCE UNSPECIFIED: ICD-10-CM

## 2018-09-17 DIAGNOSIS — I10 ESSENTIAL HYPERTENSION: ICD-10-CM

## 2018-09-17 DIAGNOSIS — Z53.20 ASSESSMENT EXAMINATION REFUSED: ICD-10-CM

## 2018-09-17 DIAGNOSIS — M54.5 CHRONIC LOW BACK PAIN, UNSPECIFIED BACK PAIN LATERALITY, WITH SCIATICA PRESENCE UNSPECIFIED: ICD-10-CM

## 2018-09-17 DIAGNOSIS — I10 BENIGN ESSENTIAL HYPERTENSION: Primary | ICD-10-CM

## 2018-09-17 PROCEDURE — 1036F TOBACCO NON-USER: CPT | Performed by: FAMILY MEDICINE

## 2018-09-17 PROCEDURE — 99213 OFFICE O/P EST LOW 20 MIN: CPT | Performed by: FAMILY MEDICINE

## 2018-09-17 PROCEDURE — 3008F BODY MASS INDEX DOCD: CPT | Performed by: FAMILY MEDICINE

## 2018-09-17 RX ORDER — AMLODIPINE BESYLATE 10 MG/1
10 TABLET ORAL DAILY
Qty: 30 TABLET | Refills: 1 | Status: SHIPPED | OUTPATIENT
Start: 2018-09-17 | End: 2020-08-09 | Stop reason: SDUPTHER

## 2018-09-17 RX ORDER — AMLODIPINE BESYLATE 10 MG/1
10 TABLET ORAL DAILY
Qty: 30 TABLET | Refills: 3 | Status: CANCELLED | OUTPATIENT
Start: 2018-09-17

## 2018-09-17 RX ORDER — ALPRAZOLAM 0.25 MG/1
0.25 TABLET ORAL 3 TIMES DAILY PRN
Qty: 30 TABLET | Refills: 0 | Status: CANCELLED | OUTPATIENT
Start: 2018-09-17

## 2018-09-17 RX ORDER — LOSARTAN POTASSIUM 50 MG/1
50 TABLET ORAL DAILY
Qty: 30 TABLET | Refills: 1 | Status: SHIPPED | OUTPATIENT
Start: 2018-09-17 | End: 2019-10-16

## 2018-09-17 RX ORDER — TRAMADOL HYDROCHLORIDE 50 MG/1
TABLET ORAL
Qty: 120 TABLET | Refills: 0 | Status: CANCELLED | OUTPATIENT
Start: 2018-09-17

## 2018-09-17 NOTE — PROGRESS NOTES
Subjective:      Patient ID: Nacho García is a 57 y o  female  Patient presents to discuss anxiety and chronic back pain  Says that her anxiety has been out of control lately, psychiatry is unable to see her until Monday 9/24  She has been using her Xanax 0 25 mg more than prescribed--ran out of medication and is requesting refill today  Says she does not leave her house, avoids interactions with others because people are telling her she is to [employer] that friends and family are always nagging" about whether or not she took her medication that day  Also says that her back pain has been flaring over the last several weeks  She is taking her tramadol every 4 hours instead of every 6 hours as prescribed  Says she is almost out of this and is requesting a refill of this medication as well today  Reports her back pain is incapacitating when she does not have tramadol available    Patient states that she stopped her lisinopril around August 11th due to muscle spasms  This was not reported to the office until today  Patient has not been monitoring her blood pressure at home      Hypertension   This is a chronic problem  The current episode started more than 1 year ago  The problem is uncontrolled  Associated symptoms include anxiety  Pertinent negatives include no blurred vision, chest pain, headaches, malaise/fatigue, neck pain, orthopnea, palpitations, peripheral edema, PND, shortness of breath or sweats  There are no associated agents to hypertension  Risk factors for coronary artery disease include family history, post-menopausal state, sedentary lifestyle and stress         The following portions of the patient's history were reviewed and updated as appropriate: allergies, current medications, past family history, past medical history, past social history, past surgical history and problem list       Current Outpatient Prescriptions:     ALPRAZolam (XANAX) 0 25 mg tablet, Take 0 25 mg by mouth 3 (three) times a day as needed, Disp: , Rfl: 0    amLODIPine (NORVASC) 10 mg tablet, Take 1 tablet (10 mg total) by mouth daily, Disp: 30 tablet, Rfl: 1    Diclofenac Sodium 1 5 % SOLN, Place 40 drops on the skin 4 (four) times a day as needed (pain), Disp: 1 Bottle, Rfl: 5    diltiazem (CARDIZEM LA) 120 MG 24 hr tablet, Take 1 tablet (120 mg total) by mouth daily, Disp: 30 tablet, Rfl: 3    gabapentin (NEURONTIN) 300 mg capsule, Take 1 capsule by mouth 3 (three) times a day  , Disp: , Rfl:     ketoconazole (NIZORAL) 2 % cream, Apply topically 2 (two) times a day, Disp: 15 g, Rfl: 0    traMADol (ULTRAM) 50 mg tablet, Take one tablet 4 times daily as needed, Disp: 120 tablet, Rfl: 0    losartan (COZAAR) 50 mg tablet, Take 1 tablet (50 mg total) by mouth daily, Disp: 30 tablet, Rfl: 1      Review of Systems   Constitutional: Negative for activity change, fatigue, fever and malaise/fatigue  HENT: Negative for congestion, ear pain, sinus pain and sore throat  Eyes: Negative for blurred vision, pain and itching  Respiratory: Negative for cough and shortness of breath  Cardiovascular: Negative for chest pain, palpitations, orthopnea and PND  Gastrointestinal: Negative for abdominal pain, constipation, diarrhea, nausea and vomiting  Endocrine: Negative for cold intolerance and heat intolerance  Genitourinary: Negative for dysuria  Musculoskeletal: Positive for back pain (CHRONIC)  Negative for myalgias and neck pain  Skin: Negative for color change and rash  Neurological: Negative for dizziness, syncope and headaches  Hematological: Negative for adenopathy  Psychiatric/Behavioral: Positive for decreased concentration and dysphoric mood  Negative for behavioral problems, self-injury, sleep disturbance and suicidal ideas  The patient is nervous/anxious and is hyperactive            Objective:      /92 (BP Location: Left arm, Patient Position: Sitting, Cuff Size: Large)   Pulse 91   Temp 100 1 °F (37 8 °C) (Tympanic)   Ht 5' 0 5" (1 537 m)   Wt 73 2 kg (161 lb 5 oz)   SpO2 98%   BMI 30 99 kg/m²          Physical Exam   Constitutional: She appears well-developed and well-nourished  No distress  HENT:   DECLINED   Eyes:   DECLINED   Neck:   DECLINED   Cardiovascular:   DECLINED   Pulmonary/Chest:   DECLINED   Abdominal:   DECLINED   Musculoskeletal:   DECLINED   Neurological:   DECLINED   Skin:   DECLINED   Psychiatric: Her speech is normal  Her affect is angry and labile  She is agitated  Cognition and memory are normal  She expresses impulsivity and inappropriate judgment  She expresses no homicidal and no suicidal ideation  Assessment/Plan:  Diagnoses and all orders for this visit:    Benign essential hypertension  -     UNCONTROLLED  -     losartan (COZAAR) 50 mg tablet; Take 1 tablet (50 mg total) by mouth daily  -     amLODIPine (NORVASC) 10 mg tablet; Take 1 tablet (10 mg total) by mouth daily    Chronic low back pain, unspecified back pain laterality, with sciatica presence unspecified        -     Offered referral to Pain Mgmt given uncontrolled LBP, however PT REFUSED        -     Not due for tramadol until 9/27 per PDMP -- reiterated the importance of adhering to our pain/controlled substances contract (improper dosing is grounds for med discontinuation/weaning)    Assessment examination refused    Other orders  -     Cancel: ALPRAZolam (XANAX) 0 25 mg tablet; Take 1 tablet (0 25 mg total) by mouth 3 (three) times a day as needed  -     Cancel: amLODIPine (NORVASC) 10 mg tablet; Take 1 tablet (10 mg total) by mouth daily  -     Cancel: traMADol (ULTRAM) 50 mg tablet; Take one tablet 4 times daily as needed        17-year-old female presents to review anxiety, chronic back pain, and uncontrolled hypertension  Patient to keep personal telephone call with her psychiatrist in the middle of our visit--psychiatry has agreed to refill alprazolam for her at this time    Patient visibly disgusted by my adherence to our controlled substance agreement (no early refills for tramadol, take only as prescribed)  She repeatedly declined my offer to set up an appointment with pain management to reassess her chronic back pain and see if there is anything else we can do to better control her symptoms  Patient is phone rang a 2nd time during our office visit--she became irate and walked out of the appointment when I kindly asked her to silence her phone  Norvasc prescription was refilled today--NEW prescription for losartan 50 mg was added and sent to the pharmacy in an attempt to better control patient's hypertension  RTO 4 weeks for hypertension follow-up    The patient is not in agreement with the proposed medical treatment plan for the following reasons: does not intend to follow these recommendations and prefers current lifestyle and does not desire to change           Raad Quinn, DO

## 2018-11-05 DIAGNOSIS — I10 BENIGN ESSENTIAL HYPERTENSION: ICD-10-CM

## 2018-11-05 RX ORDER — DILTIAZEM HYDROCHLORIDE 120 MG/1
CAPSULE, COATED, EXTENDED RELEASE ORAL
Qty: 30 CAPSULE | Refills: 3 | Status: SHIPPED | OUTPATIENT
Start: 2018-11-05 | End: 2019-10-16

## 2018-11-05 NOTE — TELEPHONE ENCOUNTER
Left message on machine informing patient    Patients mailbox did not have enough space to leave the entire message

## 2018-11-26 ENCOUNTER — TRANSCRIBE ORDERS (OUTPATIENT)
Dept: ADMINISTRATIVE | Facility: HOSPITAL | Age: 63
End: 2018-11-26

## 2018-11-26 DIAGNOSIS — Z12.31 ENCOUNTER FOR SCREENING MAMMOGRAM FOR MALIGNANT NEOPLASM OF BREAST: Primary | ICD-10-CM

## 2019-04-05 DIAGNOSIS — Z12.11 SCREENING FOR COLON CANCER: Primary | ICD-10-CM

## 2019-05-07 ENCOUNTER — TELEPHONE (OUTPATIENT)
Dept: FAMILY MEDICINE CLINIC | Facility: OTHER | Age: 64
End: 2019-05-07

## 2019-05-10 ENCOUNTER — HOSPITAL ENCOUNTER (OUTPATIENT)
Dept: MRI IMAGING | Facility: HOSPITAL | Age: 64
Discharge: HOME | End: 2019-05-10
Attending: FAMILY MEDICINE
Payer: COMMERCIAL

## 2019-05-10 DIAGNOSIS — G89.29 CHRONIC MIDLINE LOW BACK PAIN WITH LEFT-SIDED SCIATICA: ICD-10-CM

## 2019-05-10 DIAGNOSIS — M54.42 CHRONIC MIDLINE LOW BACK PAIN WITH LEFT-SIDED SCIATICA: ICD-10-CM

## 2019-05-10 DIAGNOSIS — M51.36 DISC DEGENERATION, LUMBAR: ICD-10-CM

## 2019-05-10 DIAGNOSIS — M54.16 LUMBAR RADICULOPATHY: ICD-10-CM

## 2019-05-10 PROCEDURE — 72148 MRI LUMBAR SPINE W/O DYE: CPT

## 2019-06-03 ENCOUNTER — HOSPITAL ENCOUNTER (EMERGENCY)
Facility: HOSPITAL | Age: 64
Discharge: HOME | End: 2019-06-03
Attending: EMERGENCY MEDICINE | Admitting: EMERGENCY MEDICINE
Payer: COMMERCIAL

## 2019-06-03 ENCOUNTER — APPOINTMENT (EMERGENCY)
Dept: RADIOLOGY | Facility: HOSPITAL | Age: 64
End: 2019-06-03
Payer: COMMERCIAL

## 2019-06-03 VITALS
OXYGEN SATURATION: 98 % | TEMPERATURE: 98.4 F | HEART RATE: 64 BPM | SYSTOLIC BLOOD PRESSURE: 153 MMHG | RESPIRATION RATE: 18 BRPM | WEIGHT: 188.49 LBS | BODY MASS INDEX: 36.21 KG/M2 | DIASTOLIC BLOOD PRESSURE: 78 MMHG

## 2019-06-03 DIAGNOSIS — S89.92XA INJURY OF LEFT KNEE, INITIAL ENCOUNTER: Primary | ICD-10-CM

## 2019-06-03 PROCEDURE — 99283 EMERGENCY DEPT VISIT LOW MDM: CPT

## 2019-06-03 PROCEDURE — 99283 EMERGENCY DEPT VISIT LOW MDM: CPT | Performed by: PHYSICIAN ASSISTANT

## 2019-06-03 PROCEDURE — 73564 X-RAY EXAM KNEE 4 OR MORE: CPT

## 2019-06-03 RX ORDER — ACETAMINOPHEN 325 MG/1
975 TABLET ORAL ONCE
Status: COMPLETED | OUTPATIENT
Start: 2019-06-03 | End: 2019-06-03

## 2019-06-03 RX ORDER — TRAMADOL HYDROCHLORIDE 50 MG/1
50 TABLET ORAL EVERY 6 HOURS PRN
Qty: 12 TABLET | Refills: 0 | Status: SHIPPED | OUTPATIENT
Start: 2019-06-03 | End: 2019-06-13

## 2019-06-03 RX ADMIN — ACETAMINOPHEN 975 MG: 325 TABLET, FILM COATED ORAL at 09:39

## 2019-06-21 ENCOUNTER — TRANSCRIBE ORDERS (OUTPATIENT)
Dept: ADMINISTRATIVE | Facility: HOSPITAL | Age: 64
End: 2019-06-21

## 2019-06-21 DIAGNOSIS — D16.6: Primary | ICD-10-CM

## 2019-07-02 ENCOUNTER — HOSPITAL ENCOUNTER (OUTPATIENT)
Dept: NUCLEAR MEDICINE | Facility: HOSPITAL | Age: 64
Discharge: HOME | End: 2019-07-02
Payer: COMMERCIAL

## 2019-07-02 DIAGNOSIS — D16.6: ICD-10-CM

## 2019-07-02 PROCEDURE — 78306 BONE IMAGING WHOLE BODY: CPT

## 2019-07-02 PROCEDURE — A9503 TC99M MEDRONATE: HCPCS

## 2019-07-15 ENCOUNTER — TRANSCRIBE ORDERS (OUTPATIENT)
Dept: NEUROSURGERY | Facility: CLINIC | Age: 64
End: 2019-07-15

## 2019-07-15 DIAGNOSIS — M54.50 LOWER BACK PAIN: Primary | ICD-10-CM

## 2019-08-14 ENCOUNTER — OFFICE VISIT (OUTPATIENT)
Dept: NEUROSURGERY | Facility: CLINIC | Age: 64
End: 2019-08-14
Payer: COMMERCIAL

## 2019-08-14 VITALS
RESPIRATION RATE: 16 BRPM | TEMPERATURE: 97.8 F | WEIGHT: 188 LBS | HEART RATE: 64 BPM | HEIGHT: 61 IN | BODY MASS INDEX: 35.5 KG/M2

## 2019-08-14 DIAGNOSIS — M47.817 FACET DEGENERATION OF LUMBOSACRAL REGION: Primary | ICD-10-CM

## 2019-08-14 DIAGNOSIS — M54.50 LOWER BACK PAIN: ICD-10-CM

## 2019-08-14 DIAGNOSIS — M89.9 BONE LESION: ICD-10-CM

## 2019-08-14 PROCEDURE — 99203 OFFICE O/P NEW LOW 30 MIN: CPT | Performed by: NEUROLOGICAL SURGERY

## 2019-08-14 RX ORDER — GABAPENTIN 600 MG/1
600 TABLET ORAL 4 TIMES DAILY
Refills: 0 | COMMUNITY
Start: 2019-06-25 | End: 2019-10-16

## 2019-08-14 RX ORDER — CARVEDILOL 25 MG/1
25 TABLET ORAL 2 TIMES DAILY WITH MEALS
COMMUNITY
Start: 2019-08-07 | End: 2020-08-21 | Stop reason: SDUPTHER

## 2019-08-14 RX ORDER — BUSPIRONE HYDROCHLORIDE 15 MG/1
30 TABLET ORAL 2 TIMES DAILY
Refills: 0 | COMMUNITY
Start: 2019-07-03 | End: 2020-07-01

## 2019-08-14 NOTE — PROGRESS NOTES
Assessment/Plan:    No problem-specific Assessment & Plan notes found for this encounter  Problem List Items Addressed This Visit        Other    Lower back pain    Relevant Orders    Ambulatory referral to Pain Management      Other Visit Diagnoses     Facet degeneration of lumbosacral region    -  Primary    Relevant Orders    Ambulatory referral to Pain Management    Bone lesion        Relevant Orders    Ambulatory referral to Pain Management    MRI thoracic spine with and without contrast            Subjective:      Patient ID: Audrea Shone is a 70 y o  female  HPI    The following portions of the patient's history were reviewed and updated as appropriate:   She  has a past medical history of Anxiety, Depression, Fatty liver, Hyperlipidemia, Hypertension, and Psychiatric disorder  She   Patient Active Problem List    Diagnosis Date Noted    Leg cramps, sleep related 2016    Pain syndrome, chronic 2014    Pain of lower leg 2013    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    Disc degeneration, lumbar 2013    Lumbar radiculopathy 2013    Myofascial pain syndrome 2013    Spondylosis of lumbar region without myelopathy or radiculopathy 2013    Backache 04/10/2013    Benign essential hypertension 2012    Bipolar I disorder, single manic episode ([facility] [address] ) 2012    Hyperlipidemia 2012    Lower back pain 2012     She  has a past surgical history that includes Breast surgery (Bilateral);  section; Dilation and curettage of uterus; Cardiac surgery; and Ectopic pregnancy surgery  Her family history includes Bipolar disorder in her sister; Cirrhosis in her father; Diabetes in her family; Heart disease in her family and sister; Hypertension in her family and sister; Lung cancer in her mother; Stroke in her family; Thyroid disease in her family  She  reports that she has been smoking   She has never used smokeless tobacco  She reports that she drinks alcohol  She reports that she has current or past drug history  Drug: Marijuana  Current Outpatient Medications   Medication Sig Dispense Refill    acetaminophen (TYLENOL) 100 mg/mL solution Take 10 mg/kg by mouth every 4 (four) hours as needed for fever      amLODIPine (NORVASC) 10 mg tablet Take 1 tablet (10 mg total) by mouth daily 30 tablet 1    busPIRone (BUSPAR) 15 mg tablet Take 15 mg by mouth 3 (three) times a day  0    carvedilol (COREG) 25 mg tablet       gabapentin (NEURONTIN) 300 mg capsule Take 1 capsule by mouth 3 (three) times a day        gabapentin (NEURONTIN) 600 MG tablet Take 600 mg by mouth 4 (four) times a day  0    losartan (COZAAR) 50 mg tablet Take 1 tablet (50 mg total) by mouth daily 30 tablet 1    ALPRAZolam (XANAX) 0 25 mg tablet Take 0 25 mg by mouth 3 (three) times a day as needed  0    Diclofenac Sodium 1 5 % SOLN Place 40 drops on the skin 4 (four) times a day as needed (pain) (Patient not taking: Reported on 8/14/2019) 1 Bottle 5    diltiazem (CARDIZEM CD) 120 mg 24 hr capsule TAKE 1 CAPSULE BY MOUTH EVERY DAY (Patient not taking: Reported on 8/14/2019) 30 capsule 3    ketoconazole (NIZORAL) 2 % cream Apply topically 2 (two) times a day (Patient not taking: Reported on 8/14/2019) 15 g 0    traMADol (ULTRAM) 50 mg tablet Take one tablet 4 times daily as needed (Patient not taking: Reported on 8/14/2019) 120 tablet 0     No current facility-administered medications for this visit        Current Outpatient Medications on File Prior to Visit   Medication Sig    acetaminophen (TYLENOL) 100 mg/mL solution Take 10 mg/kg by mouth every 4 (four) hours as needed for fever    amLODIPine (NORVASC) 10 mg tablet Take 1 tablet (10 mg total) by mouth daily    busPIRone (BUSPAR) 15 mg tablet Take 15 mg by mouth 3 (three) times a day    carvedilol (COREG) 25 mg tablet     gabapentin (NEURONTIN) 300 mg capsule Take 1 capsule by mouth 3 (three) times a day      gabapentin (NEURONTIN) 600 MG tablet Take 600 mg by mouth 4 (four) times a day    losartan (COZAAR) 50 mg tablet Take 1 tablet (50 mg total) by mouth daily    ALPRAZolam (XANAX) 0 25 mg tablet Take 0 25 mg by mouth 3 (three) times a day as needed    Diclofenac Sodium 1 5 % SOLN Place 40 drops on the skin 4 (four) times a day as needed (pain) (Patient not taking: Reported on 8/14/2019)    diltiazem (CARDIZEM CD) 120 mg 24 hr capsule TAKE 1 CAPSULE BY MOUTH EVERY DAY (Patient not taking: Reported on 8/14/2019)    ketoconazole (NIZORAL) 2 % cream Apply topically 2 (two) times a day (Patient not taking: Reported on 8/14/2019)    traMADol (ULTRAM) 50 mg tablet Take one tablet 4 times daily as needed (Patient not taking: Reported on 8/14/2019)     No current facility-administered medications on file prior to visit  She is allergic to methyldopa       Review of Systems   Constitutional: Negative  HENT: Negative  Eyes: Negative  Respiratory: Positive for cough and shortness of breath  Cardiovascular: Negative  Gastrointestinal: Negative  Endocrine: Negative  Genitourinary: Negative  Musculoskeletal: Positive for arthralgias (Upper and lower back pain  ), back pain (radiats to Left leg  Back pain > Leg), myalgias and neck pain (Pain in Left arm)  Negative for gait problem  Skin: Negative  Allergic/Immunologic: Negative  Neurological: Positive for numbness (Left leg and left fingers)  Negative for dizziness, weakness and headaches  Hematological: Negative  Psychiatric/Behavioral: Negative for sleep disturbance  Objective:      Pulse 64   Temp 97 8 °F (36 6 °C) (Tympanic)   Resp 16   Ht 5' 0 5" (1 537 m)   Wt 85 3 kg (188 lb)   BMI 36 11 kg/m²           I have personally obtain history and examined patient  I have personally reviewed case including all pertinent investigations/studies       Time spent 40 minutes   More than 50% of total time spent on counseling and coordination of care as described above including patient education, discussion of risks and rationale of conservative vs surgical treatment options  HPI    Chronic low back pain with with standing and pronged activity  Hx of T12 VB lesion, 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  Denies weakness, numbness, loss of bowel and bladder deficit  Exam    Full strength bilateral LE  Negative SLR  Intact sensation  Intact DTR  Point-tenderness lower lumbar paraspinal region  Paravertebral spasm  Able to toe and heel walk  anatalgic gait  Able to perform tandem                        Back:     Symmetric, no curvature, ROM normal, no CVA tenderness   Lungs:     Clear to auscultation bilaterally, respirations unlabored   Chest wall:    No tenderness or deformity                   Extremities:   Extremities normal, atraumatic, no cyanosis or edema   Pulses:   2+ and symmetric all extremities   Skin:   Skin color, texture, turgor normal, no rashes or lesions     Radiology    MRI non-contrast    Interval progression of T12 lesion over 6 yrs, no lytic or destructive characteristics  No edema/disc space involvement  Favour more benign process  L4/5 grade 1 spondylolisthesis with marked bilateral facet arthropathy and T2 signal change    Summary and Plan    Ms Galina Escobar has acute on chronic LBP that is likely related to severely degenerative facet disease  Today we reviewed the conservative options including PT, HILTON and medications  I explained her that surgery would offer her no guarantee of improvement with elevated risk of complications  She explained to me that she is not keen about surgery to begin with  I have referred her to Dr Jesús Mitchell for further appraisal and ordered an MRI t-spine w/wo contrast to better characterize the slow growing lesion at the T12 level  I will see her in [place] in 6 months

## 2019-10-16 ENCOUNTER — CONSULT (OUTPATIENT)
Dept: PAIN MEDICINE | Facility: CLINIC | Age: 64
End: 2019-10-16
Payer: COMMERCIAL

## 2019-10-16 VITALS
HEART RATE: 73 BPM | DIASTOLIC BLOOD PRESSURE: 78 MMHG | TEMPERATURE: 99.2 F | BODY MASS INDEX: 35.15 KG/M2 | SYSTOLIC BLOOD PRESSURE: 125 MMHG | WEIGHT: 183 LBS

## 2019-10-16 DIAGNOSIS — M51.16 INTERVERTEBRAL DISC DISORDER WITH RADICULOPATHY OF LUMBAR REGION: Primary | ICD-10-CM

## 2019-10-16 DIAGNOSIS — F41.9 ANXIETY: ICD-10-CM

## 2019-10-16 DIAGNOSIS — M54.50 LOWER BACK PAIN: ICD-10-CM

## 2019-10-16 DIAGNOSIS — M47.817 FACET DEGENERATION OF LUMBOSACRAL REGION: ICD-10-CM

## 2019-10-16 PROCEDURE — 99204 OFFICE O/P NEW MOD 45 MIN: CPT | Performed by: ANESTHESIOLOGY

## 2019-10-16 RX ORDER — DIAZEPAM 5 MG/1
TABLET ORAL
Qty: 1 TABLET | Refills: 0 | Status: SHIPPED | OUTPATIENT
Start: 2019-10-16 | End: 2019-11-05 | Stop reason: ALTCHOICE

## 2019-10-16 RX ORDER — LISINOPRIL 10 MG/1
20 TABLET ORAL DAILY
COMMUNITY
End: 2020-10-19

## 2019-10-16 NOTE — PROGRESS NOTES
Assessment  1  Intervertebral disc disorder with radiculopathy of lumbar region    2  Facet degeneration of lumbosacral region    3  Lower back pain    4  Anxiety        Plan  The patient's symptoms, history/physical are consistent with pain that is multifactorial in origin but predominantly the result of underlying lumbar spondylosis and disc bulging which is leading to radicular symptoms into her legs  At this time, I discussed performing a lumbar epidural steroid injection at the L4 level to help reduce swelling inflammation which is leading to her pain symptoms  She was apprised of the most common risks and would like to proceed  She will be scheduled for an upcoming Tuesday or Thursday under fluoroscopic guidance  A prescription for Diazepam 5 mg was sent to her pharmacy to help with preprocedure anxiolysis  [place] Prescription Drug Monitoring Program report was reviewed and was appropriate     Complete risks and benefits including bleeding, infection, tissue reaction, nerve injury and allergic reaction were discussed  The approach was demonstrated using models and literature was provided  Verbal and written consent was obtained  My impressions and treatment recommendations were discussed in detail with the patient who verbalized understanding and had no further questions  Discharge instructions were provided  I personally saw and examined the patient and I agree with the above discussed plan of care  Orders Placed This Encounter   Procedures    [place] spine and pain procedure     Standing Status:   Future     Standing Expiration Date:   10/16/2023     Order Specific Question:   Reason for Exam:     Answer:   LESI     Order Specific Question:   Anticoagulant hold needed?      Answer:   No     New Medications Ordered This Visit   Medications    lisinopril (ZESTRIL) 10 mg tablet     Sig: Take 10 mg by mouth daily    diazepam (VALIUM) 5 mg tablet     Sig: Take 1 tablet 2 hours prior to procedure Dispense:  1 tablet     Refill:  0       History of Present Illness    Carey Reno is a 75 y o  female who presents for consultation in regards to lower back pain that radiates into the left greater than right leg  Symptoms have been present for over 10 years without any precipitating injury or trauma  Pain is moderate to severe rated 7-8/10 on numeric rating scale and felt constantly  Symptoms are worst in the morning  Pain is described to be dull, aching, throbbing, sharp with numbness in the lower back  She feels weakness of the legs  Symptoms are aggravated with kneeling, standing, bending, walking, exercise, coughing, sneezing  There is no change with lying down relaxation  Treatment history has included use of gabapentin 400 mg 3 times a day which provides mild relief  Prior physical therapy and exercise on her own has provided no relief  Heat/ice provides moderate relief  She smokes marijuana daily which provides mild relief  Of note, the patient has a T12 vertebral body lesion for which she is following with Dr Lennox Nephew    I have personally reviewed and/or updated the patient's past medical history, past surgical history, family history, social history, current medications, allergies, and vital signs today  Review of Systems   Constitutional: Positive for unexpected weight change  Negative for fever  HENT: Negative for trouble swallowing  Eyes: Positive for visual disturbance  Respiratory: Positive for shortness of breath  Negative for wheezing  Cardiovascular: Negative for chest pain and palpitations  Gastrointestinal: Negative for constipation, diarrhea, nausea and vomiting  Endocrine: Negative for cold intolerance, heat intolerance and polydipsia  Genitourinary: Negative for difficulty urinating and frequency  Musculoskeletal: Positive for gait problem and joint swelling  Negative for arthralgias and myalgias  Skin: Negative for rash     Neurological: Positive for weakness and numbness  Negative for dizziness, seizures, syncope and headaches  Hematological: Does not bruise/bleed easily  Psychiatric/Behavioral: Positive for dysphoric mood  The patient is nervous/anxious  All other systems reviewed and are negative        Patient Active Problem List   Diagnosis    Backache    Benign essential hypertension    Bipolar I disorder, single manic episode ([facility] [address] )    Disc degeneration, lumbar    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    Hyperlipidemia    Leg cramps, sleep related    Lower back pain    Lumbar radiculopathy    Myofascial pain syndrome    Pain of lower leg    Pain syndrome, chronic    Spondylosis of lumbar region without myelopathy or radiculopathy       Past Medical History:   Diagnosis Date    Anxiety     Depression     Fatty liver     Hyperlipidemia     Hypertension     Psychiatric disorder        Past Surgical History:   Procedure Laterality Date    BREAST SURGERY Bilateral     Reduction Procedure    CARDIAC SURGERY       SECTION      x4    DILATION AND CURETTAGE OF UTERUS      ECTOPIC PREGNANCY SURGERY         Family History   Problem Relation Age of Onset    Lung cancer Mother     Cirrhosis Father     Hypertension Sister     Bipolar disorder Sister     Heart disease Sister     Diabetes Family     Heart disease Family     Hypertension Family     Stroke Family     Thyroid disease Family        Social History     Occupational History    Occupation: retired   Tobacco Use    Smoking status: Smoker, Current Status Unknown    Smokeless tobacco: Never Used    Tobacco comment: 2 Black and milds per day   Substance and Sexual Activity    Alcohol use: Yes     Comment: occasional    Drug use: Yes     Types: Marijuana    Sexual activity: Not on file       Current Outpatient Medications on File Prior to Visit   Medication Sig    acetaminophen (TYLENOL) 100 mg/mL solution Take 10 mg/kg by mouth every 4 (four) hours as needed for fever    amLODIPine (NORVASC) 10 mg tablet Take 1 tablet (10 mg total) by mouth daily    busPIRone (BUSPAR) 15 mg tablet Take 15 mg by mouth 3 (three) times a day    carvedilol (COREG) 25 mg tablet     gabapentin (NEURONTIN) 300 mg capsule Take 1 capsule by mouth 3 (three) times a day      lisinopril (ZESTRIL) 10 mg tablet Take 10 mg by mouth daily    [DISCONTINUED] ALPRAZolam (XANAX) 0 25 mg tablet Take 0 25 mg by mouth 3 (three) times a day as needed    [DISCONTINUED] Diclofenac Sodium 1 5 % SOLN Place 40 drops on the skin 4 (four) times a day as needed (pain) (Patient not taking: Reported on 8/14/2019)    [DISCONTINUED] diltiazem (CARDIZEM CD) 120 mg 24 hr capsule TAKE 1 CAPSULE BY MOUTH EVERY DAY (Patient not taking: Reported on 8/14/2019)    [DISCONTINUED] gabapentin (NEURONTIN) 600 MG tablet Take 600 mg by mouth 4 (four) times a day    [DISCONTINUED] ketoconazole (NIZORAL) 2 % cream Apply topically 2 (two) times a day (Patient not taking: Reported on 8/14/2019)    [DISCONTINUED] losartan (COZAAR) 50 mg tablet Take 1 tablet (50 mg total) by mouth daily    [DISCONTINUED] traMADol (ULTRAM) 50 mg tablet Take one tablet 4 times daily as needed (Patient not taking: Reported on 8/14/2019)     No current facility-administered medications on file prior to visit  Allergies   Allergen Reactions    Methyldopa Shortness Of Breath and Other (See Comments)     Aldomet       Physical Exam    /78   Pulse 73   Temp 99 2 °F (37 3 °C) (Oral)   Wt 83 kg (183 lb)   BMI 35 15 kg/m²     Constitutional: normal, well developed, well nourished, alert, in no distress and non-toxic and no overt pain behavior   and obese  Eyes: anicteric  HEENT: grossly intact  Neck: supple, symmetric, trachea midline and no masses   Pulmonary:even and unlabored  Cardiovascular:No edema or pitting edema present  Skin:Normal without rashes or lesions and well hydrated  Psychiatric:Mood and affect appropriate  Neurologic:Cranial Nerves II-XII grossly intact  Musculoskeletal:antalgic     Lumbar Spine Exam  Appearance:  Normal lordosis  Palpation/Tenderness:  left lumbar paraspinal tenderness  right lumbar paraspinal tenderness  Diffuse tenderness  Sensory:  no sensory deficits noted  Range of Motion:  Flexion:  Minimally limited  with pain  Extension:  Moderately limited  with pain  Lateral Flexion - Left:  Moderately limited  with pain  Lateral Flexion - Right:  Moderately limited  with pain  Rotation - Left:  Moderately limited  with pain  Rotation - Right:  Moderately limited  with pain  Motor Strength:  Left hip flexion:  5/5  Left hip extension:  5/5  Right hip flexion:  5/5  Right hip extension:  5/5  Left knee flexion:  5/5  Left knee extension:  5/5  Right knee flexion:  5/5  Right knee extension:  5/5  Left foot dorsiflexion:  5/5  Left foot plantar flexion:  5/5  Right foot dorsiflexion:  5/5  Right foot plantar flexion:  5/5  Reflexes:  Left Patellar:  1+   Right Patellar:  1+   Left Achilles:  1+   Right Achilles:  1+   Special Tests:  Left Straight Leg Test:  positive  Right Straight Leg Test:  positive  Left Bernardo's Maneuver:  negative  Right Bernardo's Maneuver:  negative      Imaging      MRI LUMBAR SPINE WITHOUT CONTRAST (5/10/2019)     INDICATION: M51 36: Other intervertebral disc degeneration, lumbar region  M54 16: Radiculopathy, lumbar region  M54 42: Lumbago with sciatica, left side  G89 29: Other chronic pain  Chronic low back pain radiating to left leg      COMPARISON:  9/3/2013; 11/13/2013     TECHNIQUE:  Sagittal T1, sagittal T2, sagittal inversion recovery, axial T1 and axial T2, coronal T2        IMAGE QUALITY:  Diagnostic     FINDINGS:     VERTEBRAL BODIES:  Mild grade 1 anterolisthesis of L4 on L5 is slightly increased from the prior study    An enlarging T1 and T2 hypointense marrow lesion within the T12 vertebra has slowly increased in size, previously present on the 2013 study,   currently near completely replacing the entire T12 vertebra, now extending to the superior endplate of the E94 vertebra  The mass is indeterminate and signal characteristics, measuring 2 8 cm in maximal AP dimension by 2 1 cm in maximal craniocaudal   dimension on image 8, series 3, previously approximately 1 4 cm maximal AP dimension by 2 cm maximal craniocaudal dimension  A more posterior and superior bilobed component seen on image 10, series 3 extensive the superior endplate of V89 where there   may be a pathologic superior endplate fracture associated with this enlarging indeterminate lesion  No similar T2 hypointense lesions noted elsewhere  No bony retropulsion or epidural hematoma  No significant loss of vertebral body height      Scattered multilevel degenerative endplate changes noted  Persistent ankylosis of L5-S1 noted, similar to the prior study      SACRUM:  Scattered degenerative endplate changes  No bone marrow edema or compression abnormality in the sacrum        DISTAL CORD AND CONUS:  Normal size and signal within the distal cord and conus        PARASPINAL SOFT TISSUES:  T2 hyperintense lesions in the right kidney again demonstrated, possibly cysts but incompletely characterized      LOWER THORACIC DISC SPACES:  T9-T10: Tiny central disc protrusion without significant central canal or neural foraminal narrowing  This level was not imaged previously      T10-T11: Small central /left paracentral disc herniation, protrusion type  Mild central canal and left neural foraminal narrowing  Right neural foramen patent  This level is similar to the prior study      T11-T12: There is no focal disk herniation, central canal or neural foraminal narrowing  This level is similar to the prior study      T12-L1: There is no focal disk herniation, central canal or neural foraminal narrowing   This level is similar to the prior study      LUMBAR DISC SPACES:     L1-L2:  Small central disc herniation, protrusion type  No significant central canal or neural foraminal narrowing  This level is similar to the prior study      L2-L3:  Small left neural foraminal disc herniation, protrusion type  There is bilateral facet hypertrophy  Mild left neural foraminal narrowing  Central right neural foramen patent  This level is similar to the prior study      L3-L4:  There is a diffuse disk bulge  No significant central canal or neural foraminal narrowing  Bilateral facet hypertrophy noted  This level is similar to the prior study      L4-L5:  Progressive uncovering the intervertebral disc space  Advanced, progressive facet hypertrophy  Central canal patent  Mild to moderate right and mild left neural foraminal narrowing  Spondylotic narrowing has increased      L5-S1:  There is loss of disc height and signal   There is no focal disc herniation, central canal or neural foraminal narrowing  Bilateral facet hypertrophy noted  This level is similar to the prior study         IMPRESSION:     1  Slowly enlarging indeterminant marrow lesion in the T12 vertebra with extension to the superior endplate of Y82 and questionable associated pathologic superior endplate fracture in this region  No significant loss of vertebral body height  No bony   retropulsion  Given the slow interval growth an indolent process is suspected  Consider whole body bone scan for further characterization and to exclude other osseous lesions  2   Grade 1 anterolisthesis of L4 on L5 with progressive neural foraminal narrowing at this level secondary to progressive facet hypertrophy and anterolisthesis         MRI LUMBAR SPINE WITHOUT CONTRAST     INDICATION: M51 36: Other intervertebral disc degeneration, lumbar region  M54 16: Radiculopathy, lumbar region  M54 42: Lumbago with sciatica, left side  G89 29: Other chronic pain     Chronic low back pain radiating to left leg      COMPARISON:  9/3/2013; 11/13/2013     TECHNIQUE:  Sagittal T1, sagittal T2, sagittal inversion recovery, axial T1 and axial T2, coronal T2        IMAGE QUALITY:  Diagnostic     FINDINGS:     VERTEBRAL BODIES:  Mild grade 1 anterolisthesis of L4 on L5 is slightly increased from the prior study  An enlarging T1 and T2 hypointense marrow lesion within the T12 vertebra has slowly increased in size, previously present on the 2013 study,   currently near completely replacing the entire T12 vertebra, now extending to the superior endplate of the Q92 vertebra  The mass is indeterminate and signal characteristics, measuring 2 8 cm in maximal AP dimension by 2 1 cm in maximal craniocaudal   dimension on image 8, series 3, previously approximately 1 4 cm maximal AP dimension by 2 cm maximal craniocaudal dimension  A more posterior and superior bilobed component seen on image 10, series 3 extensive the superior endplate of O72 where there   may be a pathologic superior endplate fracture associated with this enlarging indeterminate lesion  No similar T2 hypointense lesions noted elsewhere  No bony retropulsion or epidural hematoma  No significant loss of vertebral body height      Scattered multilevel degenerative endplate changes noted  Persistent ankylosis of L5-S1 noted, similar to the prior study      SACRUM:  Scattered degenerative endplate changes  No bone marrow edema or compression abnormality in the sacrum        DISTAL CORD AND CONUS:  Normal size and signal within the distal cord and conus        PARASPINAL SOFT TISSUES:  T2 hyperintense lesions in the right kidney again demonstrated, possibly cysts but incompletely characterized      LOWER THORACIC DISC SPACES:  T9-T10: Tiny central disc protrusion without significant central canal or neural foraminal narrowing  This level was not imaged previously      T10-T11: Small central /left paracentral disc herniation, protrusion type  Mild central canal and left neural foraminal narrowing  Right neural foramen patent   This level is similar to the prior study      T11-T12: There is no focal disk herniation, central canal or neural foraminal narrowing  This level is similar to the prior study      T12-L1: There is no focal disk herniation, central canal or neural foraminal narrowing  This level is similar to the prior study      LUMBAR DISC SPACES:     L1-L2:  Small central disc herniation, protrusion type  No significant central canal or neural foraminal narrowing  This level is similar to the prior study      L2-L3:  Small left neural foraminal disc herniation, protrusion type  There is bilateral facet hypertrophy  Mild left neural foraminal narrowing  Central right neural foramen patent  This level is similar to the prior study      L3-L4:  There is a diffuse disk bulge  No significant central canal or neural foraminal narrowing  Bilateral facet hypertrophy noted  This level is similar to the prior study      L4-L5:  Progressive uncovering the intervertebral disc space  Advanced, progressive facet hypertrophy  Central canal patent  Mild to moderate right and mild left neural foraminal narrowing  Spondylotic narrowing has increased      L5-S1:  There is loss of disc height and signal   There is no focal disc herniation, central canal or neural foraminal narrowing  Bilateral facet hypertrophy noted  This level is similar to the prior study         IMPRESSION:     1  Slowly enlarging indeterminant marrow lesion in the T12 vertebra with extension to the superior endplate of C94 and questionable associated pathologic superior endplate fracture in this region  No significant loss of vertebral body height  No bony   retropulsion  Given the slow interval growth an indolent process is suspected  Consider whole body bone scan for further characterization and to exclude other osseous lesions    2   Grade 1 anterolisthesis of L4 on L5 with progressive neural foraminal narrowing at this level secondary to progressive facet hypertrophy and anterolisthesis

## 2019-10-31 ENCOUNTER — TELEPHONE (OUTPATIENT)
Dept: PAIN MEDICINE | Facility: CLINIC | Age: 64
End: 2019-10-31

## 2019-10-31 NOTE — TELEPHONE ENCOUNTER
Patient   [phone]  Dr Kennedi Booth    Patient is requesting a call back she would like to reschedule her injection  Please follow up with patient

## 2019-11-01 ENCOUNTER — TELEPHONE (OUTPATIENT)
Dept: PAIN MEDICINE | Facility: CLINIC | Age: 64
End: 2019-11-01

## 2019-11-01 NOTE — TELEPHONE ENCOUNTER
Patient request to reschedule her procedure scheduled on 11/5/19   Please advise, adam    Call back# [phone]

## 2019-11-05 ENCOUNTER — HOSPITAL ENCOUNTER (OUTPATIENT)
Dept: RADIOLOGY | Facility: CLINIC | Age: 64
Discharge: HOME | End: 2019-11-05
Attending: ANESTHESIOLOGY | Admitting: ANESTHESIOLOGY
Payer: COMMERCIAL

## 2019-11-05 VITALS
TEMPERATURE: 98.2 F | HEART RATE: 66 BPM | SYSTOLIC BLOOD PRESSURE: 135 MMHG | DIASTOLIC BLOOD PRESSURE: 87 MMHG | RESPIRATION RATE: 20 BRPM | OXYGEN SATURATION: 97 %

## 2019-11-05 DIAGNOSIS — M51.16 INTERVERTEBRAL DISC DISORDER WITH RADICULOPATHY OF LUMBAR REGION: ICD-10-CM

## 2019-11-05 PROCEDURE — 62323 NJX INTERLAMINAR LMBR/SAC: CPT | Performed by: ANESTHESIOLOGY

## 2019-11-05 RX ORDER — BUPIVACAINE HCL/PF 2.5 MG/ML
10 VIAL (ML) INJECTION ONCE
Status: COMPLETED | OUTPATIENT
Start: 2019-11-05 | End: 2019-11-05

## 2019-11-05 RX ORDER — METHYLPREDNISOLONE ACETATE 80 MG/ML
80 INJECTION, SUSPENSION INTRA-ARTICULAR; INTRALESIONAL; INTRAMUSCULAR; PARENTERAL; SOFT TISSUE ONCE
Status: COMPLETED | OUTPATIENT
Start: 2019-11-05 | End: 2019-11-05

## 2019-11-05 RX ORDER — LIDOCAINE HYDROCHLORIDE 10 MG/ML
5 INJECTION, SOLUTION EPIDURAL; INFILTRATION; INTRACAUDAL; PERINEURAL ONCE
Status: COMPLETED | OUTPATIENT
Start: 2019-11-05 | End: 2019-11-05

## 2019-11-05 RX ADMIN — METHYLPREDNISOLONE ACETATE 80 MG: 80 INJECTION, SUSPENSION INTRA-ARTICULAR; INTRALESIONAL; INTRAMUSCULAR; PARENTERAL; SOFT TISSUE at 10:44

## 2019-11-05 RX ADMIN — IOHEXOL 1 ML: 300 INJECTION, SOLUTION INTRAVENOUS at 10:44

## 2019-11-05 RX ADMIN — BUPIVACAINE HYDROCHLORIDE 2 ML: 2.5 INJECTION, SOLUTION EPIDURAL; INFILTRATION; INTRACAUDAL at 10:44

## 2019-11-05 RX ADMIN — LIDOCAINE HYDROCHLORIDE 5 ML: 10 INJECTION, SOLUTION EPIDURAL; INFILTRATION; INTRACAUDAL; PERINEURAL at 10:42

## 2019-11-05 NOTE — H&P
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    Hyperlipidemia    Leg cramps, sleep related    Lower back pain    Lumbar radiculopathy    Myofascial pain syndrome    Pain of lower leg    Pain syndrome, chronic    Spondylosis of lumbar region without myelopathy or radiculopathy       Past Medical History:   Diagnosis Date    Anxiety     Depression     Fatty liver     Hyperlipidemia     Hypertension     Psychiatric disorder        Past Surgical History:   Procedure Laterality Date    BREAST SURGERY Bilateral     Reduction Procedure    CARDIAC SURGERY       SECTION      x4    DILATION AND CURETTAGE OF UTERUS      ECTOPIC PREGNANCY SURGERY           Current Outpatient Medications:     busPIRone (BUSPAR) 15 mg tablet, Take 30 mg by mouth 2 (two) times a day , Disp: , Rfl: 0    acetaminophen (TYLENOL) 100 mg/mL solution, Take 10 mg/kg by mouth every 4 (four) hours as needed for fever, Disp: , Rfl:     amLODIPine (NORVASC) 10 mg tablet, Take 1 tablet (10 mg total) by mouth daily, Disp: 30 tablet, Rfl: 1    carvedilol (COREG) 25 mg tablet, , Disp: , Rfl:     gabapentin (NEURONTIN) 300 mg capsule, Take 1 capsule by mouth 3 (three) times a day  , Disp: , Rfl:     lisinopril (ZESTRIL) 10 mg tablet, Take 10 mg by mouth daily, Disp: , Rfl:     Current Facility-Administered Medications:     bupivacaine (PF) (MARCAINE) 0 25 % injection 10 mL, 10 mL, Epidural, Once, Diane Rudd MD    iohexol (OMNIPAQUE) 300 mg/mL injection 50 mL, 50 mL, Epidural, Once, Diane Rudd MD    lidocaine (PF) (XYLOCAINE-MPF) 1 % injection 5 mL, 5 mL, Epidural, Once, Tamera Cornell MD    methylPREDNISolone acetate (DEPO-MEDROL) injection 80 mg, 80 mg, Epidural, Once, Tamera Cornell MD    Allergies   Allergen Reactions    Methyldopa Shortness Of Breath and Other (See Comments)     Aldomet       Physical Exam:   Vitals:    11/05/19 1027   BP: 134/83   Pulse: 68   Resp: 20   Temp: 98 2 °F (36 8 °C)   SpO2: 94%     General: Awake, Alert, Oriented x 3, Mood and affect appropriate  Respiratory: Respirations even and unlabored  Cardiovascular: Peripheral pulses intact; no edema  Musculoskeletal Exam:   Lower back tenderness    ASA Score: 3    Patient/Chart Verification  Patient ID Verified: Verbal  Consents Confirmed: To be obtained in the Pre-Procedure area  H&P( within 30 days) Verified: To be obtained in the Pre-Procedure area  Allergies Reviewed: Yes  Anticoag/NSAID held?: No(pt took  mg last night)  Currently on antibiotics?: No    Assessment:   1   Intervertebral disc disorder with radiculopathy of lumbar region        Plan: DAVIDA

## 2019-11-05 NOTE — DISCHARGE INSTR - LAB
Epidural Steroid Injection   WHAT YOU NEED TO KNOW:   An epidural steroid injection (HILTON) is a procedure to inject steroid medicine into the epidural space  The epidural space is between your spinal cord and vertebrae  Steroids reduce inflammation and fluid buildup in your spine that may be causing pain  You may be given pain medicine along with the steroids  ACTIVITY  · Do not drive or operate machinery today  · No strenuous activity today - bending, lifting, etc   · You may resume normal activites starting tomorrow - start slowly and as tolerated  · You may shower today, but no tub baths or hot tubs  · You may have numbness for several hours from the local anesthetic  Please use caution and common sense, especially with weight-bearing activities  CARE OF THE INJECTION SITE  · If you have soreness or pain, apply ice to the area today (20 minutes on/20 minutes off)  · Starting tomorrow, you may use warm, moist heat or ice if needed  · You may have an increase or change in your discomfort for 36-48 hours after your treatment  · Apply ice and continue with any pain medication you have been prescribed  · Notify [facility] if you have any of the following: redness, drainage, swelling, headache, stiff neck or fever above 100°F     SPECIAL INSTRUCTIONS  · Our office will contact you in approximately 7 days for a progress report  MEDICATIONS  · Continue to take all routine medications  · Our office may have instructed you to hold some medications  If you have a problem specifically related to your procedure, please call our office at [phone]  Problems not related to your procedure should be directed to your primary care physician

## 2019-11-12 ENCOUNTER — TELEPHONE (OUTPATIENT)
Dept: PAIN MEDICINE | Facility: CLINIC | Age: 64
End: 2019-11-12

## 2019-11-12 NOTE — TELEPHONE ENCOUNTER
1st attempt  Unable to lm to cb with % improvement and pain level    Patients number is not reachable

## 2019-12-02 DIAGNOSIS — M51.16 INTERVERTEBRAL DISC DISORDER WITH RADICULOPATHY OF LUMBAR REGION: Primary | ICD-10-CM

## 2019-12-02 NOTE — TELEPHONE ENCOUNTER
Pt is calling back stating she still has some pain  Pain level 6/10 laying down and pain level 8-9/10 when she is up  Pt states she stays laid down because of the pain she experiences and cant stand up straight    Pt is requesting anything injection

## 2019-12-02 NOTE — TELEPHONE ENCOUNTER
I s/w pt who reports annoying lower back pain on both sides, which is equally the same  Pain of B/L posterior thighs, left >right  Weakness of both legs, left>right  Pt denies numbness or tingling of legs  Pt said pain is less when lying down and worse when up and moving around  Steps are worse, using a cane to get around  Told pt I would forward update to FQ

## 2020-02-18 ENCOUNTER — TELEPHONE (OUTPATIENT)
Dept: NEUROSURGERY | Facility: CLINIC | Age: 65
End: 2020-02-18

## 2020-02-18 NOTE — TELEPHONE ENCOUNTER
Called pt at [phone] to reschedule apt with [facility] due to cancelled Tspine MRI needed for scheduled apt, but phone call could not be placed   Automated machine left stated message, "The number you are trying to call is not reachable"

## 2020-03-18 ENCOUNTER — HOSPITAL ENCOUNTER (OUTPATIENT)
Dept: MRI IMAGING | Facility: HOSPITAL | Age: 65
Discharge: HOME | End: 2020-03-18
Attending: NEUROLOGICAL SURGERY
Payer: COMMERCIAL

## 2020-03-18 DIAGNOSIS — M89.9 BONE LESION: ICD-10-CM

## 2020-03-18 PROCEDURE — 72157 MRI CHEST SPINE W/O & W/DYE: CPT

## 2020-03-18 PROCEDURE — A9585 GADOBUTROL INJECTION: HCPCS | Performed by: NEUROLOGICAL SURGERY

## 2020-03-18 RX ADMIN — GADOBUTROL 8 ML: 604.72 INJECTION INTRAVENOUS at 08:05

## 2020-03-19 ENCOUNTER — TELEPHONE (OUTPATIENT)
Dept: PAIN MEDICINE | Facility: MEDICAL CENTER | Age: 65
End: 2020-03-19

## 2020-03-25 ENCOUNTER — TELEMEDICINE (OUTPATIENT)
Dept: PAIN MEDICINE | Facility: CLINIC | Age: 65
End: 2020-03-25
Payer: COMMERCIAL

## 2020-03-25 DIAGNOSIS — M47.816 SPONDYLOSIS OF LUMBAR REGION WITHOUT MYELOPATHY OR RADICULOPATHY: ICD-10-CM

## 2020-03-25 DIAGNOSIS — G89.4 PAIN SYNDROME, CHRONIC: Primary | ICD-10-CM

## 2020-03-25 DIAGNOSIS — M79.18 MYOFASCIAL PAIN SYNDROME: ICD-10-CM

## 2020-03-25 PROCEDURE — 3079F DIAST BP 80-89 MM HG: CPT | Performed by: ANESTHESIOLOGY

## 2020-03-25 PROCEDURE — 99214 OFFICE O/P EST MOD 30 MIN: CPT | Performed by: ANESTHESIOLOGY

## 2020-03-25 PROCEDURE — 3075F SYST BP GE 130 - 139MM HG: CPT | Performed by: ANESTHESIOLOGY

## 2020-03-25 RX ORDER — METHOCARBAMOL 500 MG/1
500 TABLET, FILM COATED ORAL 2 TIMES DAILY PRN
Qty: 60 TABLET | Refills: 1 | Status: SHIPPED | OUTPATIENT
Start: 2020-03-25 | End: 2020-07-01

## 2020-03-25 NOTE — PROGRESS NOTES
Virtual Regular Visit    Problem List Items Addressed This Visit        Musculoskeletal and Integument    Myofascial pain syndrome    Relevant Medications    methocarbamol (ROBAXIN) 500 mg tablet    Spondylosis of lumbar region without myelopathy or radiculopathy       Other    Pain syndrome, chronic - Primary        Disposition  The patient is experiencing an exacerbation of lower back pain related to her arthritis  I briefly discussed medial branch blocks in anticipation of radiofrequency ablation but advised her that I would like to examine her in person so that I can discuss the procedure with her which she was amenable to  I will see her back in 4 weeks for re-evaluation at that time  For now, I will start her on methocarbamol 500 mg twice daily for spasms  I advised her that this medication may cause some sedation so if it is causing too much drowsiness, she is to cut the morning dose in half and take it in the morning and afternoon  Reason for visit is lower back pain and spasms    Encounter provider Kamala Reis MD    Provider located at [address] [place] [id number]      Recent Visits  Date Type Provider Dept   03/19/20 Telephone Kamala Reis MD [facility]   Showing recent visits within past 7 days and meeting all other requirements     Future Appointments  No visits were found meeting these conditions  Showing future appointments within next 150 days and meeting all other requirements        After connecting through [employer], the patient was identified by name and date of birth  Bard Gray was informed that this is a telemedicine visit and that the visit is being conducted through telephone which may not be secure and therefore, might not be HIPAA-compliant  My office door was closed  No one else was in the room    She acknowledged consent and understanding of privacy and security of the video platform  The patient has agreed to participate and understands they can discontinue the visit at any time  Eliot Bonner is a 75 y o  female with a history of lumbar spondylosis, lumbar degenerative disc disease, lumbar disc disorder with radiculopathy who was last seen on  for a lumbar epidural steroid injection at the L5 level  She reports that it was minimally helpful and she has continued to have significant pain in the lower back with spasms on both sides difficulty walking  She is walking hunched over she reports  She is not taking any medications currently for pain and is in significant distress  She denies any radicular symptoms into the legs currently      MRI lumbar spine was reviewed from 05/10/2019 showing multilevel spondylosis, left disc herniation L2-3 and disc bulging at L4-5      Past Medical History:   Diagnosis Date    Anxiety     Depression     Fatty liver     Hyperlipidemia     Hypertension     Psychiatric disorder        Past Surgical History:   Procedure Laterality Date    BREAST SURGERY Bilateral     Reduction Procedure    CARDIAC SURGERY       SECTION      x4    DILATION AND CURETTAGE OF UTERUS      ECTOPIC PREGNANCY SURGERY         Current Outpatient Medications   Medication Sig Dispense Refill    acetaminophen (TYLENOL) 100 mg/mL solution Take 10 mg/kg by mouth every 4 (four) hours as needed for fever      amLODIPine (NORVASC) 10 mg tablet Take 1 tablet (10 mg total) by mouth daily 30 tablet 1    busPIRone (BUSPAR) 15 mg tablet Take 30 mg by mouth 2 (two) times a day   0    carvedilol (COREG) 25 mg tablet       gabapentin (NEURONTIN) 300 mg capsule Take 1 capsule by mouth 3 (three) times a day        lisinopril (ZESTRIL) 10 mg tablet Take 10 mg by mouth daily      methocarbamol (ROBAXIN) 500 mg tablet Take 1 tablet (500 mg total) by mouth 2 (two) times a day as needed for muscle spasms 60 tablet 1     No current facility-administered medications for this visit  Allergies   Allergen Reactions    Methyldopa Shortness Of Breath and Other (See Comments)     Bib       I spent 10 minutes with the patient during this visit

## 2020-04-08 ENCOUNTER — TELEMEDICINE (OUTPATIENT)
Dept: PAIN MEDICINE | Facility: CLINIC | Age: 65
End: 2020-04-08
Payer: COMMERCIAL

## 2020-04-08 DIAGNOSIS — G89.4 PAIN SYNDROME, CHRONIC: Primary | ICD-10-CM

## 2020-04-08 DIAGNOSIS — M51.36 DISC DEGENERATION, LUMBAR: ICD-10-CM

## 2020-04-08 DIAGNOSIS — M54.16 LUMBAR RADICULOPATHY: ICD-10-CM

## 2020-04-08 DIAGNOSIS — M47.816 SPONDYLOSIS OF LUMBAR REGION WITHOUT MYELOPATHY OR RADICULOPATHY: ICD-10-CM

## 2020-04-08 PROCEDURE — 99213 OFFICE O/P EST LOW 20 MIN: CPT | Performed by: ANESTHESIOLOGY

## 2020-04-08 RX ORDER — GABAPENTIN 600 MG/1
600 TABLET ORAL 3 TIMES DAILY
Qty: 90 TABLET | Refills: 5 | Status: SHIPPED | OUTPATIENT
Start: 2020-04-08 | End: 2020-04-13

## 2020-04-13 DIAGNOSIS — M51.36 DISC DEGENERATION, LUMBAR: ICD-10-CM

## 2020-04-13 DIAGNOSIS — M54.16 LUMBAR RADICULOPATHY: ICD-10-CM

## 2020-04-13 RX ORDER — GABAPENTIN 600 MG/1
TABLET ORAL
Qty: 810 TABLET | Refills: 1 | Status: SHIPPED | OUTPATIENT
Start: 2020-04-13 | End: 2020-12-29 | Stop reason: SDUPTHER

## 2020-05-05 ENCOUNTER — TELEPHONE (OUTPATIENT)
Dept: PAIN MEDICINE | Facility: MEDICAL CENTER | Age: 65
End: 2020-05-05

## 2020-05-05 DIAGNOSIS — G89.4 PAIN SYNDROME, CHRONIC: Primary | ICD-10-CM

## 2020-05-06 ENCOUNTER — HOSPITAL ENCOUNTER (EMERGENCY)
Facility: HOSPITAL | Age: 65
Discharge: HOME | End: 2020-05-06
Attending: EMERGENCY MEDICINE | Admitting: EMERGENCY MEDICINE
Payer: COMMERCIAL

## 2020-05-06 VITALS
SYSTOLIC BLOOD PRESSURE: 194 MMHG | WEIGHT: 183 LBS | HEART RATE: 84 BPM | BODY MASS INDEX: 35.15 KG/M2 | OXYGEN SATURATION: 97 % | RESPIRATION RATE: 18 BRPM | TEMPERATURE: 98.2 F | DIASTOLIC BLOOD PRESSURE: 103 MMHG

## 2020-05-06 DIAGNOSIS — M54.50 ACUTE LOW BACK PAIN: Primary | ICD-10-CM

## 2020-05-06 DIAGNOSIS — I10 HYPERTENSION: ICD-10-CM

## 2020-05-06 PROCEDURE — 99284 EMERGENCY DEPT VISIT MOD MDM: CPT | Performed by: EMERGENCY MEDICINE

## 2020-05-06 PROCEDURE — 99283 EMERGENCY DEPT VISIT LOW MDM: CPT

## 2020-05-06 PROCEDURE — 96372 THER/PROPH/DIAG INJ SC/IM: CPT

## 2020-05-06 RX ORDER — KETOROLAC TROMETHAMINE 30 MG/ML
30 INJECTION, SOLUTION INTRAMUSCULAR; INTRAVENOUS ONCE
Status: COMPLETED | OUTPATIENT
Start: 2020-05-06 | End: 2020-05-06

## 2020-05-06 RX ORDER — LISINOPRIL 20 MG/1
20 TABLET ORAL DAILY
COMMUNITY
Start: 2020-03-21 | End: 2020-07-01

## 2020-05-06 RX ORDER — BUSPIRONE HYDROCHLORIDE 30 MG/1
30 TABLET ORAL 3 TIMES DAILY
COMMUNITY
Start: 2020-04-13 | End: 2020-08-26 | Stop reason: SDUPTHER

## 2020-05-06 RX ORDER — ASPIRIN 81 MG/1
81 TABLET ORAL DAILY
COMMUNITY

## 2020-05-06 RX ORDER — HYDROCODONE BITARTRATE AND ACETAMINOPHEN 5; 325 MG/1; MG/1
1 TABLET ORAL 2 TIMES DAILY PRN
Qty: 30 TABLET | Refills: 0 | Status: SHIPPED | OUTPATIENT
Start: 2020-05-06 | End: 2020-05-13 | Stop reason: SDUPTHER

## 2020-05-06 RX ORDER — SERTRALINE HYDROCHLORIDE 25 MG/1
25 TABLET, FILM COATED ORAL DAILY
COMMUNITY
Start: 2020-04-22 | End: 2020-07-27 | Stop reason: SDUPTHER

## 2020-05-06 RX ORDER — ORPHENADRINE CITRATE 30 MG/ML
60 INJECTION INTRAMUSCULAR; INTRAVENOUS ONCE
Status: COMPLETED | OUTPATIENT
Start: 2020-05-06 | End: 2020-05-06

## 2020-05-06 RX ADMIN — KETOROLAC TROMETHAMINE 30 MG: 30 INJECTION, SOLUTION INTRAMUSCULAR at 11:22

## 2020-05-06 RX ADMIN — ORPHENADRINE CITRATE 60 MG: 30 INJECTION INTRAMUSCULAR; INTRAVENOUS at 11:38

## 2020-05-13 ENCOUNTER — TELEMEDICINE (OUTPATIENT)
Dept: PAIN MEDICINE | Facility: CLINIC | Age: 65
End: 2020-05-13
Payer: COMMERCIAL

## 2020-05-13 DIAGNOSIS — M54.16 LUMBAR RADICULOPATHY: ICD-10-CM

## 2020-05-13 DIAGNOSIS — G89.4 PAIN SYNDROME, CHRONIC: Primary | ICD-10-CM

## 2020-05-13 DIAGNOSIS — M47.816 LUMBAR SPONDYLOSIS: ICD-10-CM

## 2020-05-13 DIAGNOSIS — M79.18 MYOFASCIAL PAIN SYNDROME: ICD-10-CM

## 2020-05-13 PROCEDURE — 99214 OFFICE O/P EST MOD 30 MIN: CPT | Performed by: ANESTHESIOLOGY

## 2020-05-13 PROCEDURE — 1160F RVW MEDS BY RX/DR IN RCRD: CPT | Performed by: ANESTHESIOLOGY

## 2020-05-13 RX ORDER — HYDROCODONE BITARTRATE AND ACETAMINOPHEN 5; 325 MG/1; MG/1
1 TABLET ORAL 3 TIMES DAILY PRN
Qty: 90 TABLET | Refills: 0 | Status: SHIPPED | OUTPATIENT
Start: 2020-05-13 | End: 2020-07-01

## 2020-07-01 ENCOUNTER — OFFICE VISIT (OUTPATIENT)
Dept: FAMILY MEDICINE CLINIC | Facility: OTHER | Age: 65
End: 2020-07-01
Payer: COMMERCIAL

## 2020-07-01 VITALS
OXYGEN SATURATION: 94 % | SYSTOLIC BLOOD PRESSURE: 134 MMHG | HEIGHT: 61 IN | BODY MASS INDEX: 35.4 KG/M2 | DIASTOLIC BLOOD PRESSURE: 78 MMHG | HEART RATE: 87 BPM | WEIGHT: 187.5 LBS | TEMPERATURE: 98.5 F

## 2020-07-01 DIAGNOSIS — Z13.1 SCREENING FOR DIABETES MELLITUS: ICD-10-CM

## 2020-07-01 DIAGNOSIS — M47.816 SPONDYLOSIS OF LUMBAR REGION WITHOUT MYELOPATHY OR RADICULOPATHY: ICD-10-CM

## 2020-07-01 DIAGNOSIS — Z13.220 SCREENING FOR HYPERLIPIDEMIA: ICD-10-CM

## 2020-07-01 DIAGNOSIS — Z78.0 POSTMENOPAUSAL: ICD-10-CM

## 2020-07-01 DIAGNOSIS — M54.16 LUMBAR RADICULOPATHY: ICD-10-CM

## 2020-07-01 DIAGNOSIS — Z11.59 NEED FOR HEPATITIS C SCREENING TEST: ICD-10-CM

## 2020-07-01 DIAGNOSIS — I10 BENIGN ESSENTIAL HYPERTENSION: Primary | ICD-10-CM

## 2020-07-01 DIAGNOSIS — Z13.820 OSTEOPOROSIS SCREENING: ICD-10-CM

## 2020-07-01 DIAGNOSIS — M79.18 MYOFASCIAL PAIN SYNDROME: ICD-10-CM

## 2020-07-01 DIAGNOSIS — Z12.39 SCREENING FOR BREAST CANCER: ICD-10-CM

## 2020-07-01 DIAGNOSIS — M51.36 DISC DEGENERATION, LUMBAR: ICD-10-CM

## 2020-07-01 DIAGNOSIS — G89.4 PAIN SYNDROME, CHRONIC: ICD-10-CM

## 2020-07-01 PROCEDURE — 3078F DIAST BP <80 MM HG: CPT | Performed by: NURSE PRACTITIONER

## 2020-07-01 PROCEDURE — 3008F BODY MASS INDEX DOCD: CPT | Performed by: NURSE PRACTITIONER

## 2020-07-01 PROCEDURE — 99214 OFFICE O/P EST MOD 30 MIN: CPT | Performed by: NURSE PRACTITIONER

## 2020-07-01 PROCEDURE — 3075F SYST BP GE 130 - 139MM HG: CPT | Performed by: NURSE PRACTITIONER

## 2020-07-01 PROCEDURE — 1160F RVW MEDS BY RX/DR IN RCRD: CPT | Performed by: NURSE PRACTITIONER

## 2020-07-01 RX ORDER — TRAMADOL HYDROCHLORIDE 50 MG/1
50 TABLET ORAL EVERY 6 HOURS PRN
Qty: 20 TABLET | Refills: 0 | Status: SHIPPED | OUTPATIENT
Start: 2020-07-01 | End: 2020-07-08 | Stop reason: SDUPTHER

## 2020-07-01 RX ORDER — LIDOCAINE 50 MG/G
1 PATCH TOPICAL DAILY
Qty: 20 PATCH | Refills: 1 | Status: SHIPPED | OUTPATIENT
Start: 2020-07-01 | End: 2020-10-19

## 2020-07-01 NOTE — PATIENT INSTRUCTIONS
Chronic Hypertension   WHAT YOU NEED TO KNOW:   Hypertension is high blood pressure (BP)  Your BP is the force of your blood moving against the walls of your arteries  Normal BP is less than 120/80  Prehypertension is between 120/80 and 139/89  Hypertension is 140/90 or higher  Hypertension causes your BP to get so high that your heart has to work much harder than normal  This can damage your heart  Chronic hypertension is a long-term condition that you can control with a healthy lifestyle or medicines  A controlled blood pressure helps protect your organs, such as your heart, lungs, brain, and kidneys  DISCHARGE INSTRUCTIONS:   Call 911 for any of the following:   · You have discomfort in your chest that feels like squeezing, pressure, fullness, or pain  · You become confused or have difficulty speaking  · You suddenly feel lightheaded or have trouble breathing  · You have pain or discomfort in your back, neck, jaw, stomach, or arm  Return to the emergency department if:   · You have a severe headache or vision loss  · You have weakness in an arm or leg  Contact your healthcare provider if:   · You feel faint, dizzy, confused, or drowsy  · You have been taking your BP medicine and your BP is still higher than your healthcare provider says it should be  · You have questions or concerns about your condition or care  Medicines: You may need any of the following:  · Medicine  may be used to help lower your BP  You may need more than one type of medicine  Take the medicine exactly as directed  · Diuretics  help decrease extra fluid that collects in your body  This will help lower your BP  You may urinate more often while you take this medicine  · Cholesterol medicine  helps lower your cholesterol level  A low cholesterol level helps prevent heart disease and makes it easier to control your blood pressure  · Take your medicine as directed    Contact your healthcare provider if you think your medicine is not helping or if you have side effects  Tell him or her if you are allergic to any medicine  Keep a list of the medicines, vitamins, and herbs you take  Include the amounts, and when and why you take them  Bring the list or the pill bottles to follow-up visits  Carry your medicine list with you in case of an emergency  Follow up with your healthcare provider as directed: You will need to return to have your blood pressure checked and to have other lab tests done  Write down your questions so you remember to ask them during your visits  Manage chronic hypertension:  Talk with your healthcare provider about these and other ways to manage hypertension:  · Take your BP at home  Sit and rest for 5 minutes before you take your BP  Extend your arm and support it on a flat surface  Your arm should be at the same level as your heart  Follow the directions that came with your BP monitor  If possible, take at least 2 BP readings each time  Take your BP at least twice a day at the same times each day, such as morning and evening  Keep a record of your BP readings and bring it to your follow-up visits  Ask your healthcare provider what your blood pressure should be  · Limit sodium (salt) as directed  Too much sodium can affect your fluid balance  Check labels to find low-sodium or no-salt-added foods  Some low-sodium foods use potassium salts for flavor  Too much potassium can also cause health problems  Your healthcare provider will tell you how much sodium and potassium are safe for you to have in a day  He or she may recommend that you limit sodium to 2,300 mg a day  · Follow the meal plan recommended by your healthcare provider  A dietitian or your provider can give you more information on low-sodium plans or the DASH (Dietary Approaches to Stop Hypertension) eating plan  The DASH plan is low in sodium, unhealthy fats, and total fat  It is high in potassium, calcium, and fiber  · Exercise to maintain a healthy weight  Exercise at least 30 minutes per day, on most days of the week  This will help decrease your blood pressure  Ask about the best exercise plan for you  · Decrease stress  This may help lower your BP  Learn ways to relax, such as deep breathing or listening to music  · Limit alcohol  Women should limit alcohol to 1 drink a day  Men should limit alcohol to 2 drinks a day  A drink of alcohol is 12 ounces of beer, 5 ounces of wine, or 1½ ounces of liquor  · Do not smoke  Nicotine and other chemicals in cigarettes and cigars can increase your BP and also cause lung damage  Ask your healthcare provider for information if you currently smoke and need help to quit  E-cigarettes or smokeless tobacco still contain nicotine  Talk to your healthcare provider before you use these products  © 2017 [employer]  Information is for End User's use only and may not be sold, redistributed or otherwise used for commercial purposes  All illustrations and images included in CareNotes® are the copyrighted property of [employer]  or [employer] Whitt  The above information is an  only  It is not intended as medical advice for individual conditions or treatments  Talk to your doctor, nurse or pharmacist before following any medical regimen to see if it is safe and effective for you

## 2020-07-01 NOTE — PROGRESS NOTES
Assessment/Plan:         Problem List Items Addressed This Visit     Lumbar radiculopathy  Disc degeneration, lumbar  Myofascial pain syndrome  Pain syndrome, chronic  --Progressively worsening, chronic lumbar back pain with radiculopathy  Given worsening over the past year, along with exam findings, will obtain repeat lumbar MRI (previous 5/2019)  Had thoracic MRI 3/2020    --Previously seen by [facility], but does not wish to go back, as she feels that visits were not helpful  Remains on gabapentin  Limited refill of tramadol given, per patient request, as she states that this was the only thing that consistently helped her in the past  PDMP queried and appropriate  Advised, however, that she will need to follow-up with pain management for her ongoing analgesic needs, however  Suggested requesting different provider for a second opinion  --Referred back to neurosurgery for f/u with T12 lesion/compression fx and to determine if she would be a possible candidate for stimulator  --Lidoderm  Heat patches PRN  --Weight loss  --Repeat referral to physical therapy  --Baseline DEXA ordered per below  --Call/ER for worsening/red flag symptoms  Relevant Medications    lidocaine (LIDODERM) 5 %    traMADol (ULTRAM) 50 mg tablet #20 only    Other Relevant Orders    Ambulatory referral to Neurosurgery    Ambulatory referral to Pain Management    MRI lumbar spine wo contrast    Benign essential hypertension   --Controlled on current regimen  --Overdue for labs, which were ordered      Relevant Orders   CBC and differential   Comprehensive metabolic panel   TSH, 3rd generation with Free T4 reflex            Other Visit Diagnoses     Screening for hyperlipidemia        Relevant Orders    Lipid Panel with Direct LDL reflex    Screening for diabetes mellitus        Relevant Orders    Hemoglobin A1C    Need for hepatitis C screening test        Relevant Orders    Hepatitis C antibody    Screening for breast cancer Relevant Orders    Mammo screening bilateral w 3d & cad    Osteoporosis screening  Postmenopausal         Relevant Orders    DXA bone density spine hip and pelvis          Overdue for mammogram which was ordered  RTO 2 months for AWV with PCP      Subjective:      Patient ID: Krzysztof Mcgarry is a 75 y o  female  New patient to myself  Previously seen by Dr Velna Cowden  Here with complaints of ongoing, progressively worsening, debilitating lower back pain since approximately 2013  No specific injuries or surgeries  Pain is constant, bilateral lower back, with radiation down left leg to foot  Associated mild numbness/tingling at times  No focal weakness  Worse with walking, any trunk movements including bending, turning, sitting  Makes it hard to sleep at night  Rates 7/10 at present  Seen in the past by pain management, but she does not feel that they are helping her  [facility]  Doesn't want to do ablation  "Done with them"  Doesn't want to go back  PT helped some in the past   No chiropractor  Gabapentin not doing much  No previous relief from Robaxin  Usha Edwige doesn't help (given most recently by ER last month)  Some relief, however, from tramadol  Followed by neurosurgery also for T12 lesion  Most recent imaging: T-spine MRI 3/2020 showing T12 vertebral body lesion, likely benign hemangioma, with associated compression fracture deformity, stable c/w 5/2019  Also  left HNP T1/2, mild degenerative changes  L-spine MRI 5/2019 showing grade 1 anterolisthesis L4/5  No previous DEXA  The following portions of the patient's history were reviewed and updated as appropriate: current medications, past family history, past medical history, past social history, past surgical history and problem list     Review of Systems   Constitutional: Negative for fever  HENT: Negative for sore throat  Respiratory: Negative for shortness of breath  Cardiovascular: Negative for chest pain  Gastrointestinal: Negative for abdominal pain  Genitourinary: Negative for difficulty urinating  Musculoskeletal: Positive for arthralgias  Skin: Negative for rash  Neurological: Positive for weakness and numbness  Objective: There were no vitals taken for this visit  Physical Exam   Constitutional: She is oriented to person, place, and time  She appears well-developed  Cardiovascular: Normal rate and regular rhythm  Pulmonary/Chest: Effort normal and breath sounds normal    Musculoskeletal: She exhibits tenderness  She exhibits no edema or deformity  Spine with normal appearance  Lower thoracic/upper lumbar axial tenderness without visualized or palpable abnormality  Bilateral paraspinous lumbar tenderness without visualized or palpable abnormality  50-75% reduced spine AROM in rotation, flexion, extension with pain  Positive supine SLR (30 deg) on left side  5/5 LE strength bilaterally  Neurological: She is alert and oriented to person, place, and time  She displays normal reflexes  No sensory deficit  She exhibits normal muscle tone  Skin: Skin is warm  No rash noted  Psychiatric: She has a normal mood and affect

## 2020-07-08 ENCOUNTER — OFFICE VISIT (OUTPATIENT)
Dept: PAIN MEDICINE | Facility: CLINIC | Age: 65
End: 2020-07-08
Payer: COMMERCIAL

## 2020-07-08 VITALS
DIASTOLIC BLOOD PRESSURE: 87 MMHG | WEIGHT: 184 LBS | BODY MASS INDEX: 36.12 KG/M2 | HEART RATE: 80 BPM | TEMPERATURE: 98.2 F | SYSTOLIC BLOOD PRESSURE: 150 MMHG | HEIGHT: 60 IN

## 2020-07-08 DIAGNOSIS — M79.18 MYOFASCIAL PAIN SYNDROME: ICD-10-CM

## 2020-07-08 DIAGNOSIS — F11.20 UNCOMPLICATED OPIOID DEPENDENCE (HCC): ICD-10-CM

## 2020-07-08 DIAGNOSIS — G89.4 PAIN SYNDROME, CHRONIC: Primary | ICD-10-CM

## 2020-07-08 DIAGNOSIS — M47.816 SPONDYLOSIS OF LUMBAR REGION WITHOUT MYELOPATHY OR RADICULOPATHY: ICD-10-CM

## 2020-07-08 DIAGNOSIS — M54.16 LUMBAR RADICULOPATHY: ICD-10-CM

## 2020-07-08 DIAGNOSIS — M51.36 DISC DEGENERATION, LUMBAR: ICD-10-CM

## 2020-07-08 DIAGNOSIS — Z79.891 LONG-TERM CURRENT USE OF OPIATE ANALGESIC: ICD-10-CM

## 2020-07-08 DIAGNOSIS — M54.50 LOW BACK PAIN, UNSPECIFIED BACK PAIN LATERALITY, UNSPECIFIED CHRONICITY, UNSPECIFIED WHETHER SCIATICA PRESENT: ICD-10-CM

## 2020-07-08 PROCEDURE — 3079F DIAST BP 80-89 MM HG: CPT | Performed by: NURSE PRACTITIONER

## 2020-07-08 PROCEDURE — 3008F BODY MASS INDEX DOCD: CPT | Performed by: NURSE PRACTITIONER

## 2020-07-08 PROCEDURE — 80305 DRUG TEST PRSMV DIR OPT OBS: CPT | Performed by: NURSE PRACTITIONER

## 2020-07-08 PROCEDURE — 1160F RVW MEDS BY RX/DR IN RCRD: CPT | Performed by: NURSE PRACTITIONER

## 2020-07-08 PROCEDURE — 99214 OFFICE O/P EST MOD 30 MIN: CPT | Performed by: NURSE PRACTITIONER

## 2020-07-08 PROCEDURE — 3077F SYST BP >= 140 MM HG: CPT | Performed by: NURSE PRACTITIONER

## 2020-07-08 RX ORDER — TRAMADOL HYDROCHLORIDE 50 MG/1
TABLET ORAL
Qty: 240 TABLET | Refills: 0 | Status: SHIPPED | OUTPATIENT
Start: 2020-07-08 | End: 2020-07-10 | Stop reason: HOSPADM

## 2020-07-08 NOTE — PATIENT INSTRUCTIONS
Opioid Dependence   WHAT YOU NEED TO KNOW:   What is opioid dependence? Opioids are medicines, such as morphine and codeine, used to treat pain  Dependence happens after you have used opioids regularly for a long period of time  Dependence means that your body gets used to how much medicine you take  Dependence is not the same as addiction  Addiction means that a person uses opioids to get high instead of using them to control pain  What are the signs and symptoms of opioid dependence? · You need more of the opioid to get the same amount of pain relief as you did when you first started taking it  · You have tried to use less opioid medicine but are not able to  · You have withdrawal symptoms when you take less of the opioid  What are the signs and symptoms of opioid withdrawal?  You may have the following signs and symptoms if you suddenly stop taking opioids or if you decrease the amount you normally take:  · Yawning     · Runny nose     · Nausea or vomiting     · Diarrhea     · Chills or goosebumps    · Muscle aches or cramps     · Anxiety  How is opioid dependence diagnosed? Your healthcare provider will do a physical exam  He will ask you questions about your symptoms and your use of opioids  He will also ask about your current and past use of other drugs and any family history of drug abuse  How is opioid dependence treated? You may be treated in a hospital or you may be treated as an outpatient  During detoxification (detox), healthcare providers will slowly decrease your dose of the opioid medicine you are dependent on  They may use another opioid medicine such as methadone to decrease symptoms of withdrawal  You may need to take this or another medicine for some time  Your healthcare provider will also replace the opioid with another pain medicine that is less likely to cause dependence  He may also suggest that you receive counseling and social support during treatment     What are the risks of opioid dependence? There is a risk of overdose during early treatment with methadone  You may become dependent on the medicines used to treat opioid dependence  Without treatment, you may develop other health problems or become addicted to opioids  Your risk of abusing alcohol and other drugs increases  You may also develop risky behaviors that can lead to an overdose, violence, and suicidal thoughts  When should I contact my healthcare provider? · Your speech is slurred  · You have difficulty staying awake  · You have nausea and vomiting  · You are easily upset or cry easily  · You have poor balance  · You have questions or concerns about your condition or care  When should I seek immediate care or call 911? · You feel lightheaded or faint  · You have a fast, slow, or irregular heartbeat  · You have a seizure  CARE AGREEMENT:   You have the right to help plan your care  Learn about your health condition and how it may be treated  Discuss treatment options with your caregivers to decide what care you want to receive  You always have the right to refuse treatment  The above information is an  only  It is not intended as medical advice for individual conditions or treatments  Talk to your doctor, nurse or pharmacist before following any medical regimen to see if it is safe and effective for you  © 2017 [employer]  Information is for End User's use only and may not be sold, redistributed or otherwise used for commercial purposes  All illustrations and images included in CareNotes® are the copyrighted property of [employer]  or [employer] Whitt

## 2020-07-08 NOTE — PROGRESS NOTES
Assessment:  1  Pain syndrome, chronic    2  Myofascial pain syndrome    3  Low back pain, unspecified back pain laterality, unspecified chronicity, unspecified whether sciatica present    4  Lumbar radiculopathy    5  Spondylosis of lumbar region without myelopathy or radiculopathy    6  Disc degeneration, lumbar        Plan:  Britta Moe is a 75 y o  female who was last seen 5/13/2020 via telemedicine presents for a follow up office visit in regards to chronic pain syndrome secondary to low back pain, lumbar radiculopathy, lumbar spondylosis, and lumbar disc degeneration  The patients current symptoms include  Back Pain  The last office visit the patient was prescribed Norco 5/325 mg by mouth 3 times daily  Since that visit, the patient was started on tramadol 50 mg by mouth 4 times daily by another provider  The patient reports that even with the increased frequency of the Norco, she was not experiencing any relief  The patient reports she has used tramadol in the past, which has helped with her pain symptoms  At this time, I will increase and continue the patient with tramadol 100 mg 4 times daily to help with her pain symptoms  One month prescriptions for tramadol 100 mg by mouth 4 times daily were electronically sent to the patient's pharmacy on file  I discussed with the patient that with medication management, the overall goal is to reduce the pain symptoms by 50%, 50% of the time, on the least amount of medications, with the least amount of side effects  The patient was agreeable verbalized understanding  The patient will continue with gabapentin as prescribed  The patient reports she does not need refills of his medications this time  The patient was instructed to come refills if needed prior to next scheduled office visit  While the patient was in the office today an opioid contract was thoroughly reviewed and signed by the patient    The patient was given adequate time to ask questions in regards to the contract and a signed copy was sent home for his/her records  The patient also completed a BECKS depression inventory and SOAPP-R today as part of our yearly opioid management program     There are risks associated with opioid medications, including dependence, addiction and tolerance  The patient understands and agrees to use these medications only as prescribed  Potential side effects of the medications include, but are not limited to, constipation, drowsiness, addiction, impaired judgment and risk of fatal overdose if not taken as prescribed  The patient was warned against driving while taking sedation medications  Sharing medications is a felony  At this point in time, the patient is showing no signs of addiction, abuse, diversion or suicidal ideation  A urine drug screen was collected at today's office visit as part of our medication management protocol  The point of care testing results were appropriate for what was being prescribed  The specimen will be sent for confirmatory testing  The drug screen is medically necessary because the patient is either dependent on opioid medication or is being considered for opioid medication therapy and the results could impact ongoing or future treatment  The drug screen is to evaluate for the presences or absence of prescribed, non-prescribed, and/or illicit drugs/substances  [place] Prescription Drug Monitoring Program report was reviewed and was appropriate      The patient is scheduled for repeat lumbar MRI, and was encouraged to keep this appointment  The patient will follow up in 4 weeks for medication prescription refill re-evaluation or sooner if symptoms worsen in the interim  The patient was agreeable and verbalized understanding  My impressions and treatment recommendations were discussed in detail with the patient who verbalized understanding and had no further questions  Discharge instructions were provided   I personally saw and examined the patient and I agree with the above discussed plan of care  No orders of the defined types were placed in this encounter  New Medications Ordered This Visit   Medications    traMADol (ULTRAM) 50 mg tablet     Sig: Take 2 tabs by mouth 4 times daily     Dispense:  240 tablet     Refill:  0       History of Present Illness:  Darius Lawson is a 75 y o  female who was last seen 5/13/2020 via telemedicine presents for a follow up office visit in regards to chronic pain syndrome secondary to low back pain, lumbar radiculopathy, lumbar spondylosis, and lumbar disc degeneration  The patients current symptoms include  Back Pain  The patient currently reports ongoing low back pain that is unchanged since her last office visit  The patient describes the pain as constant, but worse in the evening, and as dull aching, sharp, throbbing, numbness  The patient denies muscle weakness, or bowel or bladder dysfunction  The patient reports the pain prevents her from completing simple tasks such as doing chores around the house or interacting with her grandchildren  The patient had rates her pain as a 7/10 on numeric rating scale  Current pain medications include tramadol 50 mg by mouth 4 times daily, gabapentin 400 mg by mouth 3 times daily  The patient was previously prescribed methocarbamol 500 mg by mouth twice daily; however, the patient discontinued this medication as she felt it was causing increased muscle cramping  At the last office visit the patient was taking Norco 5/325 mg by mouth 3 times daily  Since that visit, the patient was started on tramadol 50 mg by mouth 4 times daily by another provider  Pain Contract Signed: 7/8/2020, Last Urine Drug Screen: 7/8/2020    I have personally reviewed and/or updated the patient's past medical history, past surgical history, family history, social history, current medications, allergies, and vital signs today       Review of Systems Constitutional: Negative for fever and unexpected weight change  HENT: Negative for trouble swallowing  Eyes: Negative for visual disturbance  Respiratory: Negative for shortness of breath and wheezing  Cardiovascular: Negative for chest pain and palpitations  Gastrointestinal: Negative for constipation, diarrhea, nausea and vomiting  Endocrine: Negative for cold intolerance, heat intolerance and polydipsia  Genitourinary: Negative for difficulty urinating and frequency  Musculoskeletal: Positive for back pain, gait problem and joint swelling  Negative for arthralgias and myalgias  Skin: Negative for rash  Neurological: Negative for dizziness, seizures, syncope, weakness and headaches  Hematological: Does not bruise/bleed easily  Psychiatric/Behavioral: Negative for dysphoric mood  All other systems reviewed and are negative        Patient Active Problem List   Diagnosis    Backache    Benign essential hypertension    Bipolar I disorder, single manic episode (HCC)    Disc degeneration, lumbar    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    Hyperlipidemia    Leg cramps, sleep related    Lower back pain    Lumbar radiculopathy    Myofascial pain syndrome    Pain of lower leg    Pain syndrome, chronic    Spondylosis of lumbar region without myelopathy or radiculopathy       Past Medical History:   Diagnosis Date    Anxiety     Depression     Fatty liver     Hyperlipidemia     Hypertension     Psychiatric disorder        Past Surgical History:   Procedure Laterality Date    BREAST SURGERY Bilateral     Reduction Procedure    CARDIAC SURGERY       SECTION      x4    DILATION AND CURETTAGE OF UTERUS      ECTOPIC PREGNANCY SURGERY         Family History   Problem Relation Age of Onset    Lung cancer Mother     Cirrhosis Father     Hypertension Sister     Bipolar disorder Sister     Heart disease Sister     Diabetes Family     Heart disease Family     Hypertension Family     Stroke Family     Thyroid disease Family        Social History     Occupational History    Occupation: retired   Tobacco Use    Smoking status: Current Every Day Smoker    Smokeless tobacco: Never Used    Tobacco comment: 2 Black and milds per day   Substance and Sexual Activity    Alcohol use: Not Currently    Drug use: Yes     Types: Marijuana    Sexual activity: Not on file       Current Outpatient Medications on File Prior to Visit   Medication Sig    acetaminophen (TYLENOL) 100 mg/mL solution Take 10 mg/kg by mouth every 4 (four) hours as needed for fever    amLODIPine (NORVASC) 10 mg tablet Take 1 tablet (10 mg total) by mouth daily    aspirin (ECOTRIN LOW STRENGTH) 81 mg EC tablet Take 81 mg by mouth daily    busPIRone (BUSPAR) 30 MG tablet Take 30 mg by mouth 3 (three) times a day    carvedilol (COREG) 25 mg tablet 25 mg 2 (two) times a day with meals     gabapentin (NEURONTIN) 600 MG tablet TAKE 1 TABLET BY MOUTH THREE TIMES A DAY    lidocaine (LIDODERM) 5 % Apply 1 patch topically daily Remove & Discard patch within 12 hours or as directed by MD    lisinopril (ZESTRIL) 10 mg tablet Take 10 mg by mouth daily    sertraline (ZOLOFT) 25 mg tablet Take 25 mg by mouth daily    [DISCONTINUED] traMADol (ULTRAM) 50 mg tablet Take 1 tablet (50 mg total) by mouth every 6 (six) hours as needed for moderate pain     No current facility-administered medications on file prior to visit          Allergies   Allergen Reactions    Methyldopa Shortness Of Breath and Other (See Comments)     Aldomet       Physical Exam:    /87   Pulse 80   Temp 98 2 °F (36 8 °C) (Oral)   Ht 5' (1 524 m)   Wt 83 5 kg (184 lb)   BMI 35 94 kg/m²     Constitutional:overweight  Eyes:anicteric  HEENT:grossly intact  Neck:supple, symmetric, trachea midline and no masses   Pulmonary:even and unlabored  Cardiovascular:No edema or pitting edema present  Skin:Normal without rashes or lesions and well hydrated  Psychiatric:Mood and affect appropriate  Neurologic:Cranial Nerves II-XII grossly intact  Musculoskeletal:normal and ambulates with cane    Imaging  MRI LUMBAR SPINE WITHOUT CONTRAST     INDICATION: M51 36: Other intervertebral disc degeneration, lumbar region  M54 16: Radiculopathy, lumbar region  M54 42: Lumbago with sciatica, left side  G89 29: Other chronic pain  Chronic low back pain radiating to left leg      COMPARISON:  9/3/2013; 11/13/2013     TECHNIQUE:  Sagittal T1, sagittal T2, sagittal inversion recovery, axial T1 and axial T2, coronal T2        IMAGE QUALITY:  Diagnostic     FINDINGS:     VERTEBRAL BODIES:  Mild grade 1 anterolisthesis of L4 on L5 is slightly increased from the prior study  An enlarging T1 and T2 hypointense marrow lesion within the T12 vertebra has slowly increased in size, previously present on the 2013 study,   currently near completely replacing the entire T12 vertebra, now extending to the superior endplate of the U78 vertebra  The mass is indeterminate and signal characteristics, measuring 2 8 cm in maximal AP dimension by 2 1 cm in maximal craniocaudal   dimension on image 8, series 3, previously approximately 1 4 cm maximal AP dimension by 2 cm maximal craniocaudal dimension  A more posterior and superior bilobed component seen on image 10, series 3 extensive the superior endplate of V87 where there   may be a pathologic superior endplate fracture associated with this enlarging indeterminate lesion  No similar T2 hypointense lesions noted elsewhere  No bony retropulsion or epidural hematoma  No significant loss of vertebral body height      Scattered multilevel degenerative endplate changes noted  Persistent ankylosis of L5-S1 noted, similar to the prior study      SACRUM:  Scattered degenerative endplate changes  No bone marrow edema or compression abnormality in the sacrum        DISTAL CORD AND CONUS:  Normal size and signal within the distal cord and conus        PARASPINAL SOFT TISSUES:  T2 hyperintense lesions in the right kidney again demonstrated, possibly cysts but incompletely characterized      LOWER THORACIC DISC SPACES:  T9-T10: Tiny central disc protrusion without significant central canal or neural foraminal narrowing  This level was not imaged previously      T10-T11: Small central /left paracentral disc herniation, protrusion type  Mild central canal and left neural foraminal narrowing  Right neural foramen patent  This level is similar to the prior study      T11-T12: There is no focal disk herniation, central canal or neural foraminal narrowing  This level is similar to the prior study      T12-L1: There is no focal disk herniation, central canal or neural foraminal narrowing  This level is similar to the prior study      LUMBAR DISC SPACES:     L1-L2:  Small central disc herniation, protrusion type  No significant central canal or neural foraminal narrowing  This level is similar to the prior study      L2-L3:  Small left neural foraminal disc herniation, protrusion type  There is bilateral facet hypertrophy  Mild left neural foraminal narrowing  Central right neural foramen patent  This level is similar to the prior study      L3-L4:  There is a diffuse disk bulge  No significant central canal or neural foraminal narrowing  Bilateral facet hypertrophy noted  This level is similar to the prior study      L4-L5:  Progressive uncovering the intervertebral disc space  Advanced, progressive facet hypertrophy  Central canal patent  Mild to moderate right and mild left neural foraminal narrowing  Spondylotic narrowing has increased      L5-S1:  There is loss of disc height and signal   There is no focal disc herniation, central canal or neural foraminal narrowing  Bilateral facet hypertrophy noted  This level is similar to the prior study         IMPRESSION:     1    Slowly enlarging indeterminant marrow lesion in the T12 vertebra with extension to the superior endplate of O67 and questionable associated pathologic superior endplate fracture in this region  No significant loss of vertebral body height  No bony   retropulsion  Given the slow interval growth an indolent process is suspected  Consider whole body bone scan for further characterization and to exclude other osseous lesions    2   Grade 1 anterolisthesis of L4 on L5 with progressive neural foraminal narrowing at this level secondary to progressive facet hypertrophy and anterolisthesis

## 2020-07-09 LAB
ALBUMIN SERPL-MCNC: 3.6 G/DL (ref 3.6–5.1)
ALBUMIN/GLOB SERPL: 1 (CALC) (ref 1–2.5)
ALP SERPL-CCNC: 73 U/L (ref 37–153)
ALT SERPL-CCNC: 8 U/L (ref 6–29)
AST SERPL-CCNC: 11 U/L (ref 10–35)
BASOPHILS # BLD AUTO: 61 CELLS/UL (ref 0–200)
BASOPHILS NFR BLD AUTO: 0.6 %
BILIRUB SERPL-MCNC: 0.3 MG/DL (ref 0.2–1.2)
BUN SERPL-MCNC: 21 MG/DL (ref 7–25)
BUN/CREAT SERPL: 15 (CALC) (ref 6–22)
CALCIUM SERPL-MCNC: 9.6 MG/DL (ref 8.6–10.4)
CHLORIDE SERPL-SCNC: 110 MMOL/L (ref 98–110)
CHOLEST SERPL-MCNC: 184 MG/DL
CHOLEST/HDLC SERPL: 4.2 (CALC)
CO2 SERPL-SCNC: 27 MMOL/L (ref 20–32)
CREAT SERPL-MCNC: 1.4 MG/DL (ref 0.5–0.99)
EOSINOPHIL # BLD AUTO: 333 CELLS/UL (ref 15–500)
EOSINOPHIL NFR BLD AUTO: 3.3 %
ERYTHROCYTE [DISTWIDTH] IN BLOOD BY AUTOMATED COUNT: 14 % (ref 11–15)
GLOBULIN SER CALC-MCNC: 3.7 G/DL (CALC) (ref 1.9–3.7)
GLUCOSE SERPL-MCNC: 100 MG/DL (ref 65–99)
HBA1C MFR BLD: 5.6 % OF TOTAL HGB
HCT VFR BLD AUTO: 40.8 % (ref 35–45)
HCV AB S/CO SERPL IA: 0.06
HCV AB SERPL QL IA: NORMAL
HDLC SERPL-MCNC: 44 MG/DL
HGB BLD-MCNC: 13.5 G/DL (ref 11.7–15.5)
LDLC SERPL CALC-MCNC: 118 MG/DL (CALC)
LYMPHOCYTES # BLD AUTO: 1424 CELLS/UL (ref 850–3900)
LYMPHOCYTES NFR BLD AUTO: 14.1 %
MCH RBC QN AUTO: 31 PG (ref 27–33)
MCHC RBC AUTO-ENTMCNC: 33.1 G/DL (ref 32–36)
MCV RBC AUTO: 93.6 FL (ref 80–100)
MONOCYTES # BLD AUTO: 788 CELLS/UL (ref 200–950)
MONOCYTES NFR BLD AUTO: 7.8 %
NEUTROPHILS # BLD AUTO: 7494 CELLS/UL (ref 1500–7800)
NEUTROPHILS NFR BLD AUTO: 74.2 %
NONHDLC SERPL-MCNC: 140 MG/DL (CALC)
PLATELET # BLD AUTO: 287 THOUSAND/UL (ref 140–400)
PMV BLD REES-ECKER: 10.9 FL (ref 7.5–12.5)
POTASSIUM SERPL-SCNC: 4.9 MMOL/L (ref 3.5–5.3)
PROT SERPL-MCNC: 7.3 G/DL (ref 6.1–8.1)
RBC # BLD AUTO: 4.36 MILLION/UL (ref 3.8–5.1)
SL AMB EGFR AFRICAN AMERICAN: 45 ML/MIN/1.73M2
SL AMB EGFR NON AFRICAN AMERICAN: 39 ML/MIN/1.73M2
SODIUM SERPL-SCNC: 143 MMOL/L (ref 135–146)
TRIGL SERPL-MCNC: 113 MG/DL
TSH SERPL-ACNC: 1.93 MIU/L (ref 0.4–4.5)
WBC # BLD AUTO: 10.1 THOUSAND/UL (ref 3.8–10.8)

## 2020-07-10 ENCOUNTER — HOSPITAL ENCOUNTER (OUTPATIENT)
Facility: HOSPITAL | Age: 65
Setting detail: OBSERVATION
Discharge: HOME | End: 2020-07-10
Attending: EMERGENCY MEDICINE | Admitting: EMERGENCY MEDICINE
Payer: COMMERCIAL

## 2020-07-10 ENCOUNTER — APPOINTMENT (EMERGENCY)
Dept: RADIOLOGY | Facility: HOSPITAL | Age: 65
End: 2020-07-10
Payer: COMMERCIAL

## 2020-07-10 VITALS
HEIGHT: 60 IN | SYSTOLIC BLOOD PRESSURE: 159 MMHG | DIASTOLIC BLOOD PRESSURE: 79 MMHG | BODY MASS INDEX: 37.66 KG/M2 | OXYGEN SATURATION: 94 % | WEIGHT: 191.8 LBS | HEART RATE: 90 BPM | RESPIRATION RATE: 20 BRPM | TEMPERATURE: 98.9 F

## 2020-07-10 DIAGNOSIS — T78.3XXA ANGIOEDEMA, INITIAL ENCOUNTER: Primary | ICD-10-CM

## 2020-07-10 DIAGNOSIS — M54.50 LOW BACK PAIN, UNSPECIFIED BACK PAIN LATERALITY, UNSPECIFIED CHRONICITY, UNSPECIFIED WHETHER SCIATICA PRESENT: ICD-10-CM

## 2020-07-10 DIAGNOSIS — R09.02 HYPOXIA: ICD-10-CM

## 2020-07-10 DIAGNOSIS — M51.36 DISC DEGENERATION, LUMBAR: ICD-10-CM

## 2020-07-10 DIAGNOSIS — R77.8 ELEVATED TROPONIN: ICD-10-CM

## 2020-07-10 PROBLEM — R79.89 ELEVATED TROPONIN: Status: ACTIVE | Noted: 2020-07-10

## 2020-07-10 LAB
6MAM UR QL CFM: NEGATIVE NG/ML
7AMINOCLONAZEPAM UR QL CFM: NEGATIVE NG/ML
A-OH ALPRAZ UR QL CFM: NEGATIVE NG/ML
ABO GROUP BLD: NORMAL
ABO GROUP BLD: NORMAL
ACCEPTABLE CREAT UR QL: NORMAL MG/DL
ACCEPTIBLE SP GR UR QL: NORMAL
ALBUMIN SERPL BCP-MCNC: 3.4 G/DL (ref 3.5–5)
ALP SERPL-CCNC: 90 U/L (ref 46–116)
ALT SERPL W P-5'-P-CCNC: 15 U/L (ref 12–78)
AMPHET UR QL CFM: NEGATIVE NG/ML
AMPHET UR QL CFM: NEGATIVE NG/ML
ANION GAP SERPL CALCULATED.3IONS-SCNC: 9 MMOL/L (ref 4–13)
AST SERPL W P-5'-P-CCNC: 18 U/L (ref 5–45)
ATRIAL RATE: 127 BPM
BASOPHILS # BLD AUTO: 0.06 THOUSANDS/ΜL (ref 0–0.1)
BASOPHILS NFR BLD AUTO: 0 % (ref 0–1)
BILIRUB SERPL-MCNC: 0.41 MG/DL (ref 0.2–1)
BLD GP AB SCN SERPL QL: NEGATIVE
BUN SERPL-MCNC: 15 MG/DL (ref 5–25)
BUPRENORPHINE UR QL CFM: NEGATIVE NG/ML
BUTALBITAL UR QL CFM: NEGATIVE NG/ML
BZE UR QL CFM: NEGATIVE NG/ML
CALCIUM SERPL-MCNC: 9.6 MG/DL (ref 8.3–10.1)
CHLORIDE SERPL-SCNC: 103 MMOL/L (ref 100–108)
CO2 SERPL-SCNC: 27 MMOL/L (ref 21–32)
CODEINE UR QL CFM: NEGATIVE NG/ML
CREAT SERPL-MCNC: 1.19 MG/DL (ref 0.6–1.3)
EDDP UR QL CFM: NEGATIVE NG/ML
EOSINOPHIL # BLD AUTO: 0.12 THOUSAND/ΜL (ref 0–0.61)
EOSINOPHIL NFR BLD AUTO: 1 % (ref 0–6)
ERYTHROCYTE [DISTWIDTH] IN BLOOD BY AUTOMATED COUNT: 14.8 % (ref 11.6–15.1)
ETHYL GLUCURONIDE UR QL CFM: NEGATIVE NG/ML
ETHYL SULFATE UR QL SCN: NEGATIVE NG/ML
FENTANYL UR QL CFM: NEGATIVE NG/ML
GFR SERPL CREATININE-BSD FRML MDRD: 55 ML/MIN/1.73SQ M
GLIADIN IGG SER IA-ACNC: NEGATIVE NG/ML
GLUCOSE SERPL-MCNC: 126 MG/DL (ref 65–140)
HCT VFR BLD AUTO: 45 % (ref 34.8–46.1)
HGB BLD-MCNC: 14.8 G/DL (ref 11.5–15.4)
HYDROCODONE UR QL CFM: NEGATIVE NG/ML
HYDROCODONE UR QL CFM: NEGATIVE NG/ML
HYDROMORPHONE UR QL CFM: NEGATIVE NG/ML
IMM GRANULOCYTES # BLD AUTO: 0.05 THOUSAND/UL (ref 0–0.2)
IMM GRANULOCYTES NFR BLD AUTO: 0 % (ref 0–2)
LORAZEPAM UR QL CFM: NEGATIVE NG/ML
LYMPHOCYTES # BLD AUTO: 1.39 THOUSANDS/ΜL (ref 0.6–4.47)
LYMPHOCYTES NFR BLD AUTO: 10 % (ref 14–44)
MCH RBC QN AUTO: 31.3 PG (ref 26.8–34.3)
MCHC RBC AUTO-ENTMCNC: 32.9 G/DL (ref 31.4–37.4)
MCV RBC AUTO: 95 FL (ref 82–98)
MDMA UR QL CFM: NEGATIVE NG/ML
ME-PHENIDATE UR QL CFM: NEGATIVE NG/ML
MEPERIDINE UR QL CFM: NEGATIVE NG/ML
METHADONE UR QL CFM: NEGATIVE NG/ML
METHAMPHET UR QL CFM: NEGATIVE NG/ML
MONOCYTES # BLD AUTO: 0.55 THOUSAND/ΜL (ref 0.17–1.22)
MONOCYTES NFR BLD AUTO: 4 % (ref 4–12)
MORPHINE UR QL CFM: NEGATIVE NG/ML
MORPHINE UR QL CFM: NEGATIVE NG/ML
NEUTROPHILS # BLD AUTO: 11.5 THOUSANDS/ΜL (ref 1.85–7.62)
NEUTS SEG NFR BLD AUTO: 85 % (ref 43–75)
NITRITE UR QL: NORMAL UG/ML
NORBUPRENORPHINE UR QL CFM: NEGATIVE NG/ML
NORDIAZEPAM UR QL CFM: NEGATIVE NG/ML
NORFENTANYL UR QL CFM: NEGATIVE NG/ML
NORHYDROCODONE UR QL CFM: NEGATIVE NG/ML
NORHYDROCODONE UR QL CFM: NEGATIVE NG/ML
NORMEPERIDINE UR QL CFM: NEGATIVE NG/ML
NOROXYCODONE UR QL CFM: NEGATIVE NG/ML
NRBC BLD AUTO-RTO: 0 /100 WBCS
NT-PROBNP SERPL-MCNC: 414 PG/ML
OXAZEPAM UR QL CFM: NEGATIVE NG/ML
OXYCODONE UR QL CFM: NEGATIVE NG/ML
OXYMORPHONE UR QL CFM: NEGATIVE NG/ML
OXYMORPHONE UR QL CFM: NEGATIVE NG/ML
P AXIS: 69 DEGREES
PCP UR QL CFM: NEGATIVE NG/ML
PHENOBARB UR QL CFM: NEGATIVE NG/ML
PLATELET # BLD AUTO: 302 THOUSANDS/UL (ref 149–390)
PMV BLD AUTO: 9.8 FL (ref 8.9–12.7)
POTASSIUM SERPL-SCNC: 4.1 MMOL/L (ref 3.5–5.3)
PR INTERVAL: 162 MS
PROT SERPL-MCNC: 9.5 G/DL (ref 6.4–8.2)
QRS AXIS: -24 DEGREES
QRSD INTERVAL: 90 MS
QT INTERVAL: 296 MS
QTC INTERVAL: 430 MS
RBC # BLD AUTO: 4.73 MILLION/UL (ref 3.81–5.12)
RESULT ALL_PRESCRIBED MEDS AND SPECIAL INSTRUCTIONS: NORMAL
RH BLD: POSITIVE
RH BLD: POSITIVE
SECOBARBITAL UR QL CFM: NEGATIVE NG/ML
SL AMB 3-METHYL-FENTANYL QUANTIFICATION: NORMAL NG/ML
SL AMB 4-ANPP QUANTIFICATION: NORMAL NG/ML
SL AMB 4-FIBF QUANTIFICATION: NORMAL NG/ML
SL AMB 5F-ADB-M7 METABOLITE QUANTIFICATION: NEGATIVE NG/ML
SL AMB 7-OH-MITRAGYNINE (KRATOM ALKALOID) QUANTIFICATION: NEGATIVE NG/ML
SL AMB AB-FUBINACA-M3 METABOLITE QUANTIFICATION: NEGATIVE NG/ML
SL AMB ACETYL FENTANYL QUANTIFICATION: NORMAL NG/ML
SL AMB ACETYL NORFENTANYL QUANTIFICATION: NORMAL NG/ML
SL AMB ACRYL FENTANYL QUANTIFICATION: NORMAL NG/ML
SL AMB BUTRYL FENTANYL QUANTIFICATION: NORMAL NG/ML
SL AMB CARFENTANIL QUANTIFICATION: NORMAL NG/ML
SL AMB CTHC (MARIJUANA METABOLITE) QUANT-MEASURED RESULT: ABNORMAL NG/ML
SL AMB CYCLOPROPYL FENTANYL QUANTIFICATION: NORMAL NG/ML
SL AMB DEXTROMETHORPHAN QUANTIFICATION: NEGATIVE NG/ML
SL AMB DEXTRORPHAN (DEXTROMETHORPHAN METABOLITE) QUANT: NEGATIVE NG/ML
SL AMB DEXTRORPHAN (DEXTROMETHORPHAN METABOLITE) QUANT: NEGATIVE NG/ML
SL AMB FURANYL FENTANYL QUANTIFICATION: NORMAL NG/ML
SL AMB JWH018 METABOLITE QUANTIFICATION: NEGATIVE NG/ML
SL AMB JWH073 METABOLITE QUANTIFICATION: NEGATIVE NG/ML
SL AMB MDMB-FUBINACA-M1 METABOLITE QUANTIFICATION: NEGATIVE NG/ML
SL AMB METHOXYACETYL FENTANYL QUANTIFICATION: NORMAL NG/ML
SL AMB N-DESMETHYL U-47700 QUANTIFICATION: NORMAL NG/ML
SL AMB N-DESMETHYL-TRAMADOL QUANT-MEASURED RESULT: NORMAL NG/ML
SL AMB O-DESMETHYL-TRAMADOL QUANT-MEASURED RESULT_SALIV: NORMAL NG/ML
SL AMB PHENTERMINE QUANTIFICATION: NEGATIVE NG/ML
SL AMB RCS4 METABOLITE QUANTIFICATION: NEGATIVE NG/ML
SL AMB RITALINIC ACID QUANTIFICATION: NEGATIVE NG/ML
SL AMB U-47700 QUANTIFICATION: NORMAL NG/ML
SODIUM SERPL-SCNC: 139 MMOL/L (ref 136–145)
SPECIMEN DRAWN SERPL: NEGATIVE NG/ML
SPECIMEN EXPIRATION DATE: NORMAL
SPECIMEN PH ACCEPTABLE UR: NORMAL
T WAVE AXIS: 89 DEGREES
TAPENTADOL UR QL CFM: NEGATIVE NG/ML
TEMAZEPAM UR QL CFM: NEGATIVE NG/ML
TEMAZEPAM UR QL CFM: NEGATIVE NG/ML
TRAMADOL UR CFM-MCNC: NORMAL NG/ML
TROPONIN I SERPL-MCNC: 0.05 NG/ML
TROPONIN I SERPL-MCNC: 0.05 NG/ML
TROPONIN I SERPL-MCNC: 0.06 NG/ML
VENTRICULAR RATE: 127 BPM
WBC # BLD AUTO: 13.67 THOUSAND/UL (ref 4.31–10.16)

## 2020-07-10 PROCEDURE — 99291 CRITICAL CARE FIRST HOUR: CPT | Performed by: EMERGENCY MEDICINE

## 2020-07-10 PROCEDURE — 80053 COMPREHEN METABOLIC PANEL: CPT | Performed by: EMERGENCY MEDICINE

## 2020-07-10 PROCEDURE — 86901 BLOOD TYPING SEROLOGIC RH(D): CPT | Performed by: EMERGENCY MEDICINE

## 2020-07-10 PROCEDURE — 94760 N-INVAS EAR/PLS OXIMETRY 1: CPT

## 2020-07-10 PROCEDURE — 99285 EMERGENCY DEPT VISIT HI MDM: CPT

## 2020-07-10 PROCEDURE — 86850 RBC ANTIBODY SCREEN: CPT | Performed by: EMERGENCY MEDICINE

## 2020-07-10 PROCEDURE — 84484 ASSAY OF TROPONIN QUANT: CPT | Performed by: EMERGENCY MEDICINE

## 2020-07-10 PROCEDURE — 86900 BLOOD TYPING SEROLOGIC ABO: CPT | Performed by: EMERGENCY MEDICINE

## 2020-07-10 PROCEDURE — 99236 HOSP IP/OBS SAME DATE HI 85: CPT | Performed by: HOSPITALIST

## 2020-07-10 PROCEDURE — 96374 THER/PROPH/DIAG INJ IV PUSH: CPT

## 2020-07-10 PROCEDURE — 93010 ELECTROCARDIOGRAM REPORT: CPT | Performed by: INTERNAL MEDICINE

## 2020-07-10 PROCEDURE — 94762 N-INVAS EAR/PLS OXIMTRY CONT: CPT

## 2020-07-10 PROCEDURE — 93005 ELECTROCARDIOGRAM TRACING: CPT

## 2020-07-10 PROCEDURE — 96375 TX/PRO/DX INJ NEW DRUG ADDON: CPT

## 2020-07-10 PROCEDURE — 84484 ASSAY OF TROPONIN QUANT: CPT | Performed by: NURSE PRACTITIONER

## 2020-07-10 PROCEDURE — 83880 ASSAY OF NATRIURETIC PEPTIDE: CPT | Performed by: EMERGENCY MEDICINE

## 2020-07-10 PROCEDURE — 71045 X-RAY EXAM CHEST 1 VIEW: CPT

## 2020-07-10 PROCEDURE — 36415 COLL VENOUS BLD VENIPUNCTURE: CPT | Performed by: EMERGENCY MEDICINE

## 2020-07-10 PROCEDURE — 85025 COMPLETE CBC W/AUTO DIFF WBC: CPT | Performed by: EMERGENCY MEDICINE

## 2020-07-10 RX ORDER — DIPHENHYDRAMINE HCL 25 MG
25 TABLET ORAL EVERY 6 HOURS PRN
Qty: 20 TABLET | Refills: 0 | Status: SHIPPED | OUTPATIENT
Start: 2020-07-10 | End: 2021-10-22

## 2020-07-10 RX ORDER — METHYLPREDNISOLONE SODIUM SUCCINATE 40 MG/ML
40 INJECTION, POWDER, LYOPHILIZED, FOR SOLUTION INTRAMUSCULAR; INTRAVENOUS EVERY 8 HOURS SCHEDULED
Status: DISCONTINUED | OUTPATIENT
Start: 2020-07-10 | End: 2020-07-10 | Stop reason: HOSPADM

## 2020-07-10 RX ORDER — DIPHENHYDRAMINE HYDROCHLORIDE 50 MG/ML
50 INJECTION INTRAMUSCULAR; INTRAVENOUS ONCE
Status: COMPLETED | OUTPATIENT
Start: 2020-07-10 | End: 2020-07-10

## 2020-07-10 RX ORDER — OXYCODONE HYDROCHLORIDE AND ACETAMINOPHEN 5; 325 MG/1; MG/1
1 TABLET ORAL EVERY 8 HOURS PRN
Qty: 6 TABLET | Refills: 0 | Status: SHIPPED | OUTPATIENT
Start: 2020-07-10 | End: 2020-07-12

## 2020-07-10 RX ORDER — OXYCODONE HYDROCHLORIDE 5 MG/1
5 TABLET ORAL EVERY 4 HOURS PRN
Status: DISCONTINUED | OUTPATIENT
Start: 2020-07-10 | End: 2020-07-10 | Stop reason: HOSPADM

## 2020-07-10 RX ORDER — DIPHENHYDRAMINE HYDROCHLORIDE 50 MG/ML
25 INJECTION INTRAMUSCULAR; INTRAVENOUS EVERY 6 HOURS
Status: DISCONTINUED | OUTPATIENT
Start: 2020-07-10 | End: 2020-07-10 | Stop reason: HOSPADM

## 2020-07-10 RX ORDER — CARVEDILOL 12.5 MG/1
25 TABLET ORAL 2 TIMES DAILY WITH MEALS
Status: DISCONTINUED | OUTPATIENT
Start: 2020-07-10 | End: 2020-07-10 | Stop reason: HOSPADM

## 2020-07-10 RX ORDER — ASPIRIN 81 MG/1
162 TABLET, CHEWABLE ORAL ONCE
Status: COMPLETED | OUTPATIENT
Start: 2020-07-10 | End: 2020-07-10

## 2020-07-10 RX ORDER — SERTRALINE HYDROCHLORIDE 25 MG/1
25 TABLET, FILM COATED ORAL DAILY
Status: DISCONTINUED | OUTPATIENT
Start: 2020-07-10 | End: 2020-07-10 | Stop reason: HOSPADM

## 2020-07-10 RX ORDER — OXYCODONE HYDROCHLORIDE 5 MG/1
2.5 TABLET ORAL EVERY 4 HOURS PRN
Status: DISCONTINUED | OUTPATIENT
Start: 2020-07-10 | End: 2020-07-10 | Stop reason: HOSPADM

## 2020-07-10 RX ORDER — LIDOCAINE 50 MG/G
1 PATCH TOPICAL DAILY
Status: DISCONTINUED | OUTPATIENT
Start: 2020-07-10 | End: 2020-07-10 | Stop reason: HOSPADM

## 2020-07-10 RX ORDER — ACETAMINOPHEN 325 MG/1
975 TABLET ORAL EVERY 8 HOURS SCHEDULED
Status: DISCONTINUED | OUTPATIENT
Start: 2020-07-10 | End: 2020-07-10 | Stop reason: HOSPADM

## 2020-07-10 RX ORDER — SODIUM CHLORIDE FOR INHALATION 0.9 %
VIAL, NEBULIZER (ML) INHALATION
Status: COMPLETED
Start: 2020-07-10 | End: 2020-07-10

## 2020-07-10 RX ORDER — FAMOTIDINE 20 MG/1
20 TABLET, FILM COATED ORAL DAILY
Qty: 5 TABLET | Refills: 0 | Status: SHIPPED | OUTPATIENT
Start: 2020-07-10 | End: 2022-03-28 | Stop reason: SDUPTHER

## 2020-07-10 RX ORDER — BUSPIRONE HYDROCHLORIDE 10 MG/1
30 TABLET ORAL 3 TIMES DAILY
Status: DISCONTINUED | OUTPATIENT
Start: 2020-07-10 | End: 2020-07-10 | Stop reason: HOSPADM

## 2020-07-10 RX ORDER — METHYLPREDNISOLONE SODIUM SUCCINATE 125 MG/2ML
125 INJECTION, POWDER, LYOPHILIZED, FOR SOLUTION INTRAMUSCULAR; INTRAVENOUS ONCE
Status: COMPLETED | OUTPATIENT
Start: 2020-07-10 | End: 2020-07-10

## 2020-07-10 RX ORDER — ASPIRIN 81 MG/1
81 TABLET ORAL DAILY
Status: DISCONTINUED | OUTPATIENT
Start: 2020-07-10 | End: 2020-07-10 | Stop reason: HOSPADM

## 2020-07-10 RX ORDER — GABAPENTIN 300 MG/1
600 CAPSULE ORAL 3 TIMES DAILY
Status: DISCONTINUED | OUTPATIENT
Start: 2020-07-10 | End: 2020-07-10 | Stop reason: HOSPADM

## 2020-07-10 RX ORDER — AMLODIPINE BESYLATE 10 MG/1
10 TABLET ORAL DAILY
Status: DISCONTINUED | OUTPATIENT
Start: 2020-07-10 | End: 2020-07-10 | Stop reason: HOSPADM

## 2020-07-10 RX ORDER — DOCUSATE SODIUM 100 MG/1
100 CAPSULE, LIQUID FILLED ORAL 2 TIMES DAILY
Status: DISCONTINUED | OUTPATIENT
Start: 2020-07-10 | End: 2020-07-10 | Stop reason: HOSPADM

## 2020-07-10 RX ADMIN — CARVEDILOL 25 MG: 12.5 TABLET, FILM COATED ORAL at 08:13

## 2020-07-10 RX ADMIN — AMLODIPINE BESYLATE 10 MG: 10 TABLET ORAL at 08:13

## 2020-07-10 RX ADMIN — BUSPIRONE HYDROCHLORIDE 30 MG: 10 TABLET ORAL at 08:14

## 2020-07-10 RX ADMIN — RACEPINEPHRINE HYDROCHLORIDE 0.5 ML: 11.25 SOLUTION RESPIRATORY (INHALATION) at 03:13

## 2020-07-10 RX ADMIN — ASPIRIN 81 MG 162 MG: 81 TABLET ORAL at 03:47

## 2020-07-10 RX ADMIN — FAMOTIDINE 20 MG: 10 INJECTION, SOLUTION INTRAVENOUS at 03:17

## 2020-07-10 RX ADMIN — LIDOCAINE 1 PATCH: 50 PATCH TOPICAL at 08:10

## 2020-07-10 RX ADMIN — DIPHENHYDRAMINE HYDROCHLORIDE 25 MG: 50 INJECTION, SOLUTION INTRAMUSCULAR; INTRAVENOUS at 13:46

## 2020-07-10 RX ADMIN — DIPHENHYDRAMINE HYDROCHLORIDE 25 MG: 50 INJECTION, SOLUTION INTRAMUSCULAR; INTRAVENOUS at 08:11

## 2020-07-10 RX ADMIN — DIPHENHYDRAMINE HYDROCHLORIDE 50 MG: 50 INJECTION, SOLUTION INTRAMUSCULAR; INTRAVENOUS at 03:16

## 2020-07-10 RX ADMIN — ASPIRIN 81 MG: 81 TABLET, COATED ORAL at 08:13

## 2020-07-10 RX ADMIN — METHYLPREDNISOLONE SODIUM SUCCINATE 125 MG: 125 INJECTION, POWDER, FOR SOLUTION INTRAMUSCULAR; INTRAVENOUS at 03:16

## 2020-07-10 RX ADMIN — SERTRALINE HYDROCHLORIDE 25 MG: 25 TABLET ORAL at 08:13

## 2020-07-10 RX ADMIN — METHYLPREDNISOLONE SODIUM SUCCINATE 40 MG: 40 INJECTION, POWDER, FOR SOLUTION INTRAMUSCULAR; INTRAVENOUS at 10:05

## 2020-07-10 RX ADMIN — GABAPENTIN 600 MG: 300 CAPSULE ORAL at 08:14

## 2020-07-10 RX ADMIN — ISODIUM CHLORIDE 3 ML: 0.03 SOLUTION RESPIRATORY (INHALATION) at 03:13

## 2020-07-10 NOTE — ASSESSMENT & PLAN NOTE
· Continue lidocaine patch and gabapentin  · Will hold tramadol in the setting of angioedema  · Aqua K   · Scheduled Tylenol  · Low dose oxycodone PRN

## 2020-07-10 NOTE — ASSESSMENT & PLAN NOTE
· Continue lidocaine patch and gabapentin    · Percocet for 2 days while tramadol on hold  · Return to prior unincreased dose of tramadol after holding it for 2 days

## 2020-07-10 NOTE — ED PROVIDER NOTES
History  Chief Complaint   Patient presents with    Shortness of Breath     Patient reports acute SOB and tongue swelling starting @ 2300  Took 1 benadryl yesi    Tongue Swelling     Patient is a 75year old female with acute onset of tongue and throat swelling with sob since 2300 tonight  No prior episodes  No chest pain or fever  No cough  Is on zestril for HTN  No new foods, soaps, detergents or medications except started tramadol 1 week ago  Was last seen in this ED on 20 for acute low back pain, HTN  [facility] website checked on this patient and last Rx filled was on 20 for tramadol for 30 day supply  Patient needed my immediate attention  History provided by:  Patient and relative (daughter)   used: No    Shortness of Breath   Associated symptoms: no chest pain, no cough, no fever and no vomiting        Prior to Admission Medications   Prescriptions Last Dose Informant Patient Reported?  Taking?   acetaminophen (TYLENOL) 100 mg/mL solution  Self Yes No   Sig: Take 10 mg/kg by mouth every 4 (four) hours as needed for fever   amLODIPine (NORVASC) 10 mg tablet  Self No No   Sig: Take 1 tablet (10 mg total) by mouth daily   aspirin (ECOTRIN LOW STRENGTH) 81 mg EC tablet  Self Yes No   Sig: Take 81 mg by mouth daily   busPIRone (BUSPAR) 30 MG tablet  Self Yes No   Sig: Take 30 mg by mouth 3 (three) times a day   carvedilol (COREG) 25 mg tablet  Self Yes No   Si mg 2 (two) times a day with meals    gabapentin (NEURONTIN) 600 MG tablet  Self No No   Sig: TAKE 1 TABLET BY MOUTH THREE TIMES A DAY   lidocaine (LIDODERM) 5 %  Self No No   Sig: Apply 1 patch topically daily Remove & Discard patch within 12 hours or as directed by MD   lisinopril (ZESTRIL) 10 mg tablet  Self Yes No   Sig: Take 10 mg by mouth daily   sertraline (ZOLOFT) 25 mg tablet  Self Yes No   Sig: Take 25 mg by mouth daily   traMADol (ULTRAM) 50 mg tablet   No No   Sig: Take 2 tabs by mouth 4 times daily Facility-Administered Medications: None       Past Medical History:   Diagnosis Date    Anxiety     Depression     Fatty liver     Hyperlipidemia     Hypertension     Psychiatric disorder        Past Surgical History:   Procedure Laterality Date    BREAST SURGERY Bilateral     Reduction Procedure    CARDIAC SURGERY       SECTION      x4    DILATION AND CURETTAGE OF UTERUS      ECTOPIC PREGNANCY SURGERY         Family History   Problem Relation Age of Onset    Lung cancer Mother     Cirrhosis Father     Hypertension Sister     Bipolar disorder Sister     Heart disease Sister     Diabetes Family     Heart disease Family     Hypertension Family     Stroke Family     Thyroid disease Family      I have reviewed and agree with the history as documented  E-Cigarette/Vaping    E-Cigarette Use Never User      E-Cigarette/Vaping Substances    Nicotine No     THC No     CBD No     Flavoring No     Other No     Unknown No      Social History     Tobacco Use    Smoking status: Current Every Day Smoker    Smokeless tobacco: Never Used    Tobacco comment: 2 Black and milds per day   Substance Use Topics    Alcohol use: Not Currently    Drug use: Yes     Types: Marijuana       Review of Systems   Constitutional: Negative for fever  HENT: Positive for facial swelling  Respiratory: Positive for shortness of breath  Negative for cough  Cardiovascular: Negative for chest pain  Gastrointestinal: Negative for nausea and vomiting  All other systems reviewed and are negative  Physical Exam  Physical Exam   Constitutional: She is oriented to person, place, and time  She appears well-developed and well-nourished  She appears distressed (moderate)  HENT:   Head: Normocephalic and atraumatic    (+) tongue angioedema noted worse on left side  Eyes: No scleral icterus  Neck: No JVD present  No tracheal deviation present     Cardiovascular: Regular rhythm and normal heart sounds  No murmur heard  Tachycardia  Pulmonary/Chest: Effort normal and breath sounds normal  No stridor  No respiratory distress  She has no wheezes  She has no rales  Abdominal: Soft  Bowel sounds are normal  There is no tenderness  Musculoskeletal: She exhibits no edema or deformity  Neurological: She is alert and oriented to person, place, and time  Skin: Skin is warm and dry  No rash noted  No erythema  Psychiatric:   Anxious  Nursing note and vitals reviewed        Vital Signs  ED Triage Vitals [07/10/20 0306]   Temperature Pulse Respirations Blood Pressure SpO2   99 °F (37 2 °C) (!) 122 22 (!) 198/105 90 %      Temp Source Heart Rate Source Patient Position - Orthostatic VS BP Location FiO2 (%)   Oral Monitor Sitting Right arm --      Pain Score       --           Vitals:    07/10/20 0306 07/10/20 0330   BP: (!) 198/105 164/95   Pulse: (!) 122 100   Patient Position - Orthostatic VS: Sitting Sitting         Visual Acuity      ED Medications  Medications   diphenhydrAMINE (BENADRYL) injection 50 mg (50 mg Intravenous Given 7/10/20 0316)   methylPREDNISolone sodium succinate (Solu-MEDROL) injection 125 mg (125 mg Intravenous Given 7/10/20 0316)   famotidine (PEPCID) injection 20 mg (20 mg Intravenous Given 7/10/20 0317)   racepinephrine 2 25 % inhalation solution 0 5 mL (0 5 mL Nebulization Given 7/10/20 0313)   sodium chloride 0 9 % inhalation solution **ADS Override Pull** (3 mL  Given 7/10/20 0313)   aspirin chewable tablet 162 mg (162 mg Oral Given 7/10/20 0347)       Diagnostic Studies  Results Reviewed     Procedure Component Value Units Date/Time    NT-BNP PRO [[id number]]  (Abnormal) Collected:  07/10/20 0313    Lab Status:  Final result Specimen:  Blood from Arm, Right Updated:  07/10/20 0345     NT-proBNP 414 pg/mL     Troponin I [[id number]]  (Abnormal) Collected:  07/10/20 0313    Lab Status:  Final result Specimen:  Blood from Arm, Right Updated:  07/10/20 0340     Troponin I 0 06 ng/mL     Comprehensive metabolic panel [[id number]]  (Abnormal) Collected:  07/10/20 0313    Lab Status:  Final result Specimen:  Blood from Arm, Right Updated:  07/10/20 0339     Sodium 139 mmol/L      Potassium 4 1 mmol/L      Chloride 103 mmol/L      CO2 27 mmol/L      ANION GAP 9 mmol/L      BUN 15 mg/dL      Creatinine 1 19 mg/dL      Glucose 126 mg/dL      Calcium 9 6 mg/dL      AST 18 U/L      ALT 15 U/L      Alkaline Phosphatase 90 U/L      Total Protein 9 5 g/dL      Albumin 3 4 g/dL      Total Bilirubin 0 41 mg/dL      eGFR 55 ml/min/1 73sq m     Narrative:       National Kidney Disease Foundation guidelines for Chronic Kidney Disease (CKD):     Stage 1 with normal or high GFR (GFR > 90 mL/min/1 73 square meters)    Stage 2 Mild CKD (GFR = 60-89 mL/min/1 73 square meters)    Stage 3A Moderate CKD (GFR = 45-59 mL/min/1 73 square meters)    Stage 3B Moderate CKD (GFR = 30-44 mL/min/1 73 square meters)    Stage 4 Severe CKD (GFR = 15-29 mL/min/1 73 square meters)    Stage 5 End Stage CKD (GFR <15 mL/min/1 73 square meters)  Note: GFR calculation is accurate only with a steady state creatinine    CBC and differential [[id number]]  (Abnormal) Collected:  07/10/20 0313    Lab Status:  Final result Specimen:  Blood from Arm, Right Updated:  07/10/20 0322     WBC 13 67 Thousand/uL      RBC 4 73 Million/uL      Hemoglobin 14 8 g/dL      Hematocrit 45 0 %      MCV 95 fL      MCH 31 3 pg      MCHC 32 9 g/dL      RDW 14 8 %      MPV 9 8 fL      Platelets 956 Thousands/uL      nRBC 0 /100 WBCs      Neutrophils Relative 85 %      Immat GRANS % 0 %      Lymphocytes Relative 10 %      Monocytes Relative 4 %      Eosinophils Relative 1 %      Basophils Relative 0 %      Neutrophils Absolute 11 50 Thousands/µL      Immature Grans Absolute 0 05 Thousand/uL      Lymphocytes Absolute 1 39 Thousands/µL      Monocytes Absolute 0 55 Thousand/µL      Eosinophils Absolute 0 12 Thousand/µL      Basophils Absolute 0 06 Thousands/µL                  XR chest 1 view portable   ED Interpretation by Zachary Balbuena MD (07/10 5816)   No acute disease read by me  Procedures  ECG 12 Lead Documentation Only  Date/Time: 7/10/2020 3:15 AM  Performed by: Zachary Balbuena MD  Authorized by: Zachary Balbuena MD     Indications / Diagnosis:  Angioedema, sob, hypoxia  ECG reviewed by me, the ED Provider: yes    Patient location:  ED  Previous ECG:     Previous ECG:  Unavailable  Quality:     Tracing quality:  Limited by artifact  Rate:     ECG rate:  127    ECG rate assessment: tachycardic    Rhythm:     Rhythm: sinus tachycardia    Ectopy:     Ectopy: none    QRS:     QRS axis:  Normal  Conduction:     Conduction: normal    ST segments:     ST segments:  Non-specific  T waves:     T waves: non-specific    Comments:      Possible LAE    CriticalCare Time  Performed by: Zachary Balbuena MD  Authorized by: Zachary Balbuena MD     Critical care provider statement:     Critical care time (minutes):  35    Critical care time was exclusive of:  Separately billable procedures and treating other patients    Critical care was necessary to treat or prevent imminent or life-threatening deterioration of the following conditions: angioedema, hypoxia      Critical care was time spent personally by me on the following activities:  Obtaining history from patient or surrogate, development of treatment plan with patient or surrogate, evaluation of patient's response to treatment, examination of patient, ordering and performing treatments and interventions, ordering and review of laboratory studies, ordering and review of radiographic studies, re-evaluation of patient's condition and review of old charts    I assumed direction of critical care for this patient from another provider in my specialty: no               ED Course  ED Course as of Jul 10 0405   Fri Jul 10, 2020   0316 Patient was instructed not to take lisinopril or similar medications ever again  0140 Labs and EKG and CXR d/w patient and daughter with patient's permission  Patient feeling better  Decreased angioedema noted  Patient does not want NTG  ASA ordered for elevated troponin  BP and HR decreased  8788 D/w critical care AP who will see patient in ED        7463 D/w critical care AP who recommends med tele with continuous pulse ox  US AUDIT      Most Recent Value   Initial Alcohol Screen: US AUDIT-C    1  How often do you have a drink containing alcohol?  0 Filed at: 07/10/2020 0308   2  How many drinks containing alcohol do you have on a typical day you are drinking? 0 Filed at: 07/10/2020 0308   3a  Male UNDER 65: How often do you have five or more drinks on one occasion? 0 Filed at: 07/10/2020 0308   3b  FEMALE Any Age, or MALE 65+: How often do you have 4 or more drinks on one occassion? 0 Filed at: 07/10/2020 0308   Audit-C Score  0 Filed at: 07/10/2020 0308            HEART Risk Score      Most Recent Value   Heart Score Risk Calculator   History  1 Filed at: 07/10/2020 0340   ECG  1 Filed at: 07/10/2020 0340   Age  2 Filed at: 07/10/2020 0340   Risk Factors  2 Filed at: 07/10/2020 0340   Troponin  1 Filed at: 07/10/2020 0340   HEART Score  7 Filed at: 07/10/2020 0340            MARK/DAST-10      Most Recent Value   How many times in the past year have you    Used an illegal drug or used a prescription medication for non-medical reasons? Never Filed at: 07/10/2020 0308                                MDM  Number of Diagnoses or Management Options  Diagnosis management comments: DDx including but not limited to: angioedema, allergic reaction, anaphylaxis, airway compromise, cellulitis; doubt ACS or MI or PE or PTX or pneumonia or CHF          Amount and/or Complexity of Data Reviewed  Clinical lab tests: ordered and reviewed  Tests in the radiology section of CPT®: ordered and reviewed  Decide to obtain previous medical records or to obtain history from someone other than the patient: yes  Review and summarize past medical records: yes  Discuss the patient with other providers: yes  Independent visualization of images, tracings, or specimens: yes          Disposition  Final diagnoses:   Angioedema, initial encounter   Hypoxia   Elevated troponin     Time reflects when diagnosis was documented in both MDM as applicable and the Disposition within this note     Time User Action Codes Description Comment    7/10/2020  3:16 AM Serge, [facility] [facility]  3XXA] Angioedema, initial encounter     7/10/2020  3:16 AM Sanjuanita Ramírez Add [R09 02] Hypoxia     7/10/2020  3:47 AM Sanjuanita Ramírez Add [R79 89] Elevated troponin       ED Disposition     ED Disposition Condition Date/Time Comment    Admit Stable Fri Jul 10, 2020  4:04 AM Case was discussed with [facility] AP [facility] [employer]  and the patient's admission status was agreed to be Admission Status: observation status to the service of Dr Oksana Herring   Follow-up Information    None         Patient's Medications   Discharge Prescriptions    No medications on file     No discharge procedures on file      PDMP Review       Value Time User    PDMP Reviewed  Yes 7/10/2020  3:02 AM Shahriar Carcamo MD          ED Provider  Electronically Signed by           Shahriar Carcamo MD  07/10/20 3334

## 2020-07-10 NOTE — DISCHARGE INSTR - AVS FIRST PAGE
Dear Krissy Cruz,     It was our pleasure to care for you here at [facility]  It is our hope that we were always able to exceed the expected standards for your care during your stay  You were hospitalized due to swelling of your tongue and difficulty breathing  You were cared for on the 4th floor by Rohan William MD under the service of Diana Aranda MD with [facility] who covers for your primary care physician (PCP), Timur Rosen MD, while you were hospitalized  If you have any questions or concerns related to this hospitalization, you may contact us at [phone]  For follow up as well as any medication refills, we recommend that you follow up with your primary care physician  A registered nurse will reach out to you by phone within a few days after your discharge to answer any additional questions that you may have after going home  However, at this time we provide for you here, the most important instructions / recommendations at discharge:     · Notable Medication Adjustments -   · Please stop taking tramadol completely for 2 days, then can resume prior dose (the unincreased dose) of tramadol  · Continue taking lisinopril  · Testing Required after Discharge -   · none  · Important follow up information -   · Set up an appointment with your primary care doctor as soon as possible  · Other Instructions -   · While off tramadol for the next 2 days, can use Percocet for pain control (prescription sent to [facility])  · Please take Benadryl 25 mg every 6 hours as needed for itching for 5 days  · Famotidine 20 mg daily for 5 days  · If you experience any recurrence of swelling or difficulty breathing, please go directly to the Emergency Department     · Please review this entire after visit summary as additional general instructions including medication list, appointments, activity, diet, any pertinent wound care, and other additional recommendations from your care team that may be provided for you        Sincerely,     Gagan Day MD

## 2020-07-10 NOTE — ASSESSMENT & PLAN NOTE
Patient Presentation:  Patient denies chest pain  RAVI: 3  Initial Troponin: 0 06  Trend x3 or to peak  Initial EKG:  Sinus tachycardia, heart rate 127  Monitor on telemetry

## 2020-07-10 NOTE — ASSESSMENT & PLAN NOTE
· Presentation: Patient presents with acute onset of tongue and throat swelling with shortness breath that began at 11:00 p m  Last night  Patient reports no prior episodes  · Patient reports taking a dose of Benadryl at home with little improvement  · Patient received IV Benadryl, IV Pepcid, IV Solu-Medrol and Racepinephrine nebulizer in the ER  · Patient reports only new prescription was for Tramadol increased dosing to QID which was started on 07/01/2020  PDMP reviewed  · Patient has chronically been on Lisinopril  · Highly suspicious that Tramadol increase in dosing is the etiology  However, will hold both Tramadol and Lisinopril at this time  · Continuous pulse oximetry  · Continue IV Solu-Medrol and IV Benadryl  · Nursing dysphagia assessment prior to oral intake  · Airway clearance precautions  · Patient currently with oxygen saturation of 100% on 2L of supplemental oxygen via nasal cannula  Wean oxygen  · Monitor respiratory status

## 2020-07-10 NOTE — ASSESSMENT & PLAN NOTE
· Presentation: Patient presents with acute onset of tongue and throat swelling with shortness breath that began at 11:00 p m  Last night  Patient reports no prior episodes  · Patient reports taking a dose of Benadryl at home with little improvement  · Patient received IV Benadryl, IV Pepcid, IV Solu-Medrol and Racepinephrine nebulizer in the ER  · Patient reports only new prescription was for Tramadol increased dosing to QID which was started on 07/01/2020  PDMP reviewed  · Patient has chronically been on Lisinopril  · Highly suspicious that Tramadol increase in dosing with interaction of lisinopril is the etiology for patient's angioedema  · This morning, patient denies any shortness of breath (SpO2 >95% on room air) with significant improvement in the swelling     · Plan is to stop taking tramadol completely for 2 days and resume on prior unincreased dose   · Can continue taking lisinopril on discharge  · Given Percocet prescription for 2 days only while not taking tramadol  · Instructed to return to ED immediately if symptoms recur  · Benadryl and famotidine for 5 days

## 2020-07-10 NOTE — ASSESSMENT & PLAN NOTE
· Present on admission, blood pressure of 198/105  · Most recent blood pressure of 167/96  · Continue carvedilol and amlodipine  · Monitor closely

## 2020-07-10 NOTE — H&P
[address] [facility]    H&P- Teresa Greene 1955, 75 y o  female MRN: [id number]    Unit/Bed#: S -01 Encounter: [id number]    Primary Care Provider: Estevan Woods MD   Date and time admitted to hospital: 7/10/2020  3:07 AM    * Angioedema  Assessment & Plan  · Presentation: Patient presents with acute onset of tongue and throat swelling with shortness breath that began at 11:00 p m  Last night  Patient reports no prior episodes  · Patient reports taking a dose of Benadryl at home with little improvement  · Patient received IV Benadryl, IV Pepcid, IV Solu-Medrol and Racepinephrine nebulizer in the ER  · Patient reports only new prescription was for Tramadol increased dosing to QID which was started on 07/01/2020  PDMP reviewed  · Patient has chronically been on Lisinopril  · Highly suspicious that Tramadol increase in dosing is the etiology  However, will hold both Tramadol and Lisinopril at this time  · Continuous pulse oximetry  · Continue IV Solu-Medrol and IV Benadryl  · Nursing dysphagia assessment prior to oral intake  · Airway clearance precautions  · Patient currently with oxygen saturation of 100% on 2L of supplemental oxygen via nasal cannula  Wean oxygen  · Monitor respiratory status  Elevated troponin  Assessment & Plan  Patient Presentation:  Patient denies chest pain  RAVI: 3  Initial Troponin: 0 06  Trend x3 or to peak  Initial EKG:  Sinus tachycardia, heart rate 127  Monitor on telemetry  Benign essential hypertension  Assessment & Plan  · Present on admission, blood pressure of 198/105  · Most recent blood pressure of 167/96  · Continue carvedilol and amlodipine  · Monitor closely  Bipolar I disorder, single manic episode ([facility] [address] )  Assessment & Plan  · Continue Zoloft and BuSpar  Disc degeneration, lumbar  Assessment & Plan  · Continue lidocaine patch and gabapentin  · Will hold tramadol in the setting of angioedema    · Aqua K   · Scheduled Tylenol  · Low dose oxycodone PRN  VTE Prophylaxis: Enoxaparin (Lovenox)  / reason for no mechanical VTE prophylaxis not indicated  Code Status: FULL  POLST: POLST form is not discussed and not completed at this time  Discussion with family: Patient    Anticipated Length of Stay:  Patient will be admitted on an Observation basis with an anticipated length of stay of  < 2 midnights  Justification for Hospital Stay: IV Solu-Medrol, IV Benadryl, trend troponins, telemetry monitoring  Total Time for Visit, including Counseling / Coordination of Care: 1 hour  Greater than 50% of this total time spent on direct patient counseling and coordination of care  Chief Complaint:   Tongue and throat swelling, shortness of breath    History of Present Illness:    Nicholaus Rinne is a 75 y o  female with a past medical history hypertension, anxiety, depression, bipolar disorder, and lumbar disc degeneration who presents with tongue and throat swelling and shortness of breath that began around 2300 last night  Patient reports that she took a dose of Benadryl at home prior to arrival which provided little improvement  Patient reports that last week her Tramadol dosing was increased related to her chronic back pain  Additionally, she states that she has been on lisinopril for "a long time"  Patient received IV Benadryl, IV Pepcid, IV Solu-Medrol and Racepinephrine nebulizer in the ER  Upon evaluation in the ER, patient was found to have an elevated troponin  Patient will be admitted under observation for IV Solu-Medrol, IV Benadryl, continuous pulse oximetry, telemetry monitoring and troponin trending  Review of Systems:    Review of Systems   Constitutional: Negative for chills and fever  HENT: Positive for facial swelling (Tongue and throat swelling) and trouble swallowing  Respiratory: Positive for shortness of breath (related to anxiety with airway swelling)  Negative for cough      Cardiovascular: Negative for chest pain  Gastrointestinal: Negative for abdominal pain, diarrhea, nausea and vomiting  Neurological: Negative for dizziness and light-headedness  Past Medical and Surgical History:     Past Medical History:   Diagnosis Date    Anxiety     Depression     Fatty liver     Hyperlipidemia     Hypertension     Psychiatric disorder        Past Surgical History:   Procedure Laterality Date    BREAST SURGERY Bilateral     Reduction Procedure    CARDIAC SURGERY       SECTION      x4    DILATION AND CURETTAGE OF UTERUS      ECTOPIC PREGNANCY SURGERY         Meds/Allergies:    Prior to Admission medications    Medication Sig Start Date End Date Taking? Authorizing Provider   acetaminophen (TYLENOL) 100 mg/mL solution Take 10 mg/kg by mouth every 4 (four) hours as needed for fever    Historical Provider, MD   amLODIPine (NORVASC) 10 mg tablet Take 1 tablet (10 mg total) by mouth daily 18   Fernanda Arvizu DO   aspirin (ECOTRIN LOW STRENGTH) 81 mg EC tablet Take 81 mg by mouth daily    Historical Provider, MD   busPIRone (BUSPAR) 30 MG tablet Take 30 mg by mouth 3 (three) times a day 20   Historical Provider, MD   carvedilol (COREG) 25 mg tablet 25 mg 2 (two) times a day with meals  19   Historical Provider, MD   gabapentin (NEURONTIN) 600 MG tablet TAKE 1 TABLET BY MOUTH THREE TIMES A DAY 20   Theda Cheadle, MD   lidocaine (LIDODERM) 5 % Apply 1 patch topically daily Remove & Discard patch within 12 hours or as directed by MD 20   RANDEE De La Garza   lisinopril (ZESTRIL) 10 mg tablet Take 10 mg by mouth daily    Historical Provider, MD   sertraline (ZOLOFT) 25 mg tablet Take 25 mg by mouth daily 20   Historical Provider, MD   traMADol Majel Abu) 50 mg tablet Take 2 tabs by mouth 4 times daily 20   RANDEE Miramontes     I have reviewed home medications with patient personally  Allergies:    Allergies   Allergen Reactions    Methyldopa Shortness Of Breath and Other (See Comments)     Aldomet       Social History:     Marital Status:    Occupation: Unknown  Patient Pre-hospital Living Situation: Private residence  Patient Pre-hospital Level of Mobility: Independent  Patient Pre-hospital Diet Restrictions: None  Substance Use History:   Social History     Substance and Sexual Activity   Alcohol Use Not Currently     Social History     Tobacco Use   Smoking Status Current Every Day Smoker   Smokeless Tobacco Never Used   Tobacco Comment    2 Black and milds per day     Social History     Substance and Sexual Activity   Drug Use Yes    Types: Marijuana       Family History:    non-contributory    Physical Exam:     Vitals:   Blood Pressure: (!) 180/98 (07/10/20 0430)  Pulse: 98 (07/10/20 0430)  Temperature: 98 °F (36 7 °C) (07/10/20 0430)  Temp Source: Oral (07/10/20 0430)  Respirations: 18 (07/10/20 0430)  Height: 5' (152 4 cm) (07/10/20 0430)  Weight - Scale: 87 kg (191 lb 12 8 oz) (07/10/20 0430)  SpO2: 100 % (07/10/20 0500)    Physical Exam   Constitutional: She is oriented to person, place, and time  She appears well-developed and well-nourished  HENT:   Head: Normocephalic  Mouth/Throat: Uvula is midline    + mild tongue angioedema   Eyes: Conjunctivae are normal  No scleral icterus  Neck: Trachea normal and normal range of motion  Normal range of motion present  Pulmonary/Chest: Effort normal  No tachypnea  No respiratory distress  She has decreased breath sounds  She has no wheezes  She has no rales  Abdominal: Soft  Bowel sounds are normal  She exhibits no distension  There is no tenderness  Musculoskeletal: Normal range of motion  She exhibits no edema or deformity  Neurological: She is alert and oriented to person, place, and time  Skin: Skin is warm and dry  Capillary refill takes less than 2 seconds  No rash noted  Psychiatric: Her speech is normal    Nursing note and vitals reviewed          Additional Data:     Lab Results: I have personally reviewed pertinent reports  Results from last 7 days   Lab Units 07/10/20  0313   WBC Thousand/uL 13 67*   HEMOGLOBIN g/dL 14 8   HEMATOCRIT % 45 0   PLATELETS Thousands/uL 302   NEUTROS PCT % 85*   LYMPHS PCT % 10*   MONOS PCT % 4   EOS PCT % 1     Results from last 7 days   Lab Units 07/10/20  0313   SODIUM mmol/L 139   POTASSIUM mmol/L 4 1   CHLORIDE mmol/L 103   CO2 mmol/L 27   BUN mg/dL 15   CREATININE mg/dL 1 19   ANION GAP mmol/L 9   CALCIUM mg/dL 9 6   ALBUMIN g/dL 3 4*   TOTAL BILIRUBIN mg/dL 0 41   ALK PHOS U/L 90   ALT U/L 15   AST U/L 18   GLUCOSE RANDOM mg/dL 126             Results from last 7 days   Lab Units 07/08/20  0949   HEMOGLOBIN A1C % of total Hgb 5 6           Imaging: I have personally reviewed pertinent reports  XR chest 1 view portable   ED Interpretation by Leatha Spaulding MD (07/10 8358)   No acute disease read by me  EKG, Pathology, and Other Studies Reviewed on Admission:   · EKG: Sinus tachycardia, heart rate 127  [facility] Yes     ** Please Note: This note has been constructed using a voice recognition system   **

## 2020-07-10 NOTE — DISCHARGE SUMMARY
Discharge- [facility] 1955, 75 y o  female MRN: [id number]    Unit/Bed#: S -01 Encounter: [id number]    Primary Care Provider: Festus Stern MD   Date and time admitted to hospital: 7/10/2020  3:07 AM        * Angioedema  Assessment & Plan  · Presentation: Patient presents with acute onset of tongue and throat swelling with shortness breath that began at 11:00 p m  Last night  Patient reports no prior episodes  · Patient reports taking a dose of Benadryl at home with little improvement  · Patient received IV Benadryl, IV Pepcid, IV Solu-Medrol and Racepinephrine nebulizer in the ER  · Patient reports only new prescription was for Tramadol increased dosing to QID which was started on 07/01/2020  PDMP reviewed  · Patient has chronically been on Lisinopril  · Highly suspicious that Tramadol increase in dosing with interaction of lisinopril is the etiology for patient's angioedema  · This morning, patient denies any shortness of breath (SpO2 >95% on room air) with significant improvement in the swelling  · Plan is to stop taking tramadol completely for 2 days and resume on prior unincreased dose   · Can continue taking lisinopril on discharge  · Given Percocet prescription for 2 days only while not taking tramadol  · Instructed to return to ED immediately if symptoms recur  · Benadryl and famotidine for 5 days    Disc degeneration, lumbar  Assessment & Plan  · Continue lidocaine patch and gabapentin  · Percocet for 2 days while tramadol on hold  · Return to prior unincreased dose of tramadol after holding it for 2 days    Benign essential hypertension  Assessment & Plan  · Continue carvedilol and amlodipine  Bipolar I disorder, single manic episode ([facility] [address] )  Assessment & Plan  · Continue Zoloft and BuSpar      Elevated troponin  Assessment & Plan  No chest pain  EKG showed sinus tachycardia on admission  Initial troponin 0 06, trended down to 0 05 and 0 05 subsequently  Non MI troponin elevation          Discharging Resident Physician: Sherlynn Pallas, MD  Attending: No att  providers found  PCP: Natali Agosto MD  Admission Date: 7/10/2020  Discharge Date: 07/10/20    Disposition:     Home    Reason for Admission: tongue swelling and shortness of breath    Consultations During Hospital Stay:  · None    Procedures Performed:     · None    Significant Findings / Test Results:     · Chest x ray: prominent pulmonary vasculature    Incidental Findings:   · none    Test Results Pending at Discharge (will require follow up):   · none     Outpatient Tests Requested:  · None    Complications:  none    Hospital Course:     Lisa Ventura is a 75 y o  female patient who originally presented to the hospital on 7/10/2020 due to tongue swelling and shortness of breath  Patient came in due to acute onset of tongue swelling and shortness of breath  She reports no intake of food products that she is allergic to  Reports that the only recent medication change she had was an increase in her tramadol (which she takes for chronic back pain)  This change was done 1 day prior to onset of symptoms  Patient took Benadryl at home which provided no relief  On arrival at the ED, patient was tachycardic, short of breath  Oxygen supplementation done  Given IV benadryl, IV pepcid, IV solumedrol and race epinephrine nebulizer  A few hours later, patient reports resolution of shortness of breath and significant improvement in the swelling  Patient was stable on room air, hemodynamically stable  The plan is for her to completely stop taking tramadol for the next 2 days  We have prescribed Percocet for 2 days in place of tramadol  Thereafter, she can resume taking tramadol but she should go back to her prior dose which was the dose before the recent increase was made  She can continue taking lisinopril  Patient instructed to return immediately to the hospital should symptoms recur   She will also continue taking benadryl and famotidine for 5 days  Condition at Discharge: stable     Discharge Day Visit / Exam:     Subjective:  Patient reports that she feels significantly better, no other complaints other than chronic back pain  Vitals: Blood Pressure: 159/79 (07/10/20 1052)  Pulse: 90 (07/10/20 0654)  Temperature: 98 9 °F (37 2 °C) (07/10/20 0654)  Temp Source: Oral (07/10/20 0654)  Respirations: 20 (07/10/20 0654)  Height: 5' (152 4 cm) (07/10/20 0430)  Weight - Scale: 87 kg (191 lb 12 8 oz) (07/10/20 0430)  SpO2: 94 % (07/10/20 1126)     Exam:   Physical Exam   Constitutional: She is oriented to person, place, and time  No distress  HENT:   Mouth/Throat: Oropharynx is clear and moist    Very minimal swelling of the tongue noted     Eyes: Conjunctivae are normal    Neck: No JVD present  Cardiovascular: Normal rate and regular rhythm  Exam reveals no friction rub  No murmur heard  Pulmonary/Chest: Effort normal and breath sounds normal  She has no wheezes  She has no rales  Abdominal: Soft  Bowel sounds are normal  There is no tenderness  Musculoskeletal: She exhibits no edema  Neurological: She is alert and oriented to person, place, and time  Skin: Skin is warm  Capillary refill takes less than 2 seconds  Psychiatric: She has a normal mood and affect  Her behavior is normal    Vitals reviewed  Discussion with Family: daughter at bedside    Discharge instructions/Information to patient and family:   See after visit summary for information provided to patient and family  Provisions for Follow-Up Care:  See after visit summary for information related to follow-up care and any pertinent home health orders  Planned Readmission: none     Discharge Medications:  See after visit summary for reconciled discharge medications provided to patient and family        ** Please Note: This note has been constructed using a voice recognition system **

## 2020-07-13 ENCOUNTER — TRANSITIONAL CARE MANAGEMENT (OUTPATIENT)
Dept: FAMILY MEDICINE CLINIC | Facility: OTHER | Age: 65
End: 2020-07-13

## 2020-07-15 ENCOUNTER — OFFICE VISIT (OUTPATIENT)
Dept: FAMILY MEDICINE CLINIC | Facility: OTHER | Age: 65
End: 2020-07-15
Payer: COMMERCIAL

## 2020-07-15 VITALS
SYSTOLIC BLOOD PRESSURE: 134 MMHG | WEIGHT: 188.8 LBS | DIASTOLIC BLOOD PRESSURE: 80 MMHG | HEIGHT: 61 IN | OXYGEN SATURATION: 97 % | TEMPERATURE: 97.4 F | HEART RATE: 80 BPM | BODY MASS INDEX: 35.65 KG/M2

## 2020-07-15 DIAGNOSIS — Z12.11 SCREENING FOR COLON CANCER: ICD-10-CM

## 2020-07-15 DIAGNOSIS — I10 BENIGN ESSENTIAL HYPERTENSION: ICD-10-CM

## 2020-07-15 DIAGNOSIS — T78.3XXA ANGIO-EDEMA, INITIAL ENCOUNTER: Primary | ICD-10-CM

## 2020-07-15 PROCEDURE — 1160F RVW MEDS BY RX/DR IN RCRD: CPT | Performed by: FAMILY MEDICINE

## 2020-07-15 PROCEDURE — 3075F SYST BP GE 130 - 139MM HG: CPT | Performed by: FAMILY MEDICINE

## 2020-07-15 PROCEDURE — 3079F DIAST BP 80-89 MM HG: CPT | Performed by: FAMILY MEDICINE

## 2020-07-15 PROCEDURE — 99214 OFFICE O/P EST MOD 30 MIN: CPT | Performed by: FAMILY MEDICINE

## 2020-07-15 PROCEDURE — 3008F BODY MASS INDEX DOCD: CPT | Performed by: FAMILY MEDICINE

## 2020-07-15 PROCEDURE — 1111F DSCHRG MED/CURRENT MED MERGE: CPT | Performed by: FAMILY MEDICINE

## 2020-07-15 RX ORDER — TRAMADOL HYDROCHLORIDE 50 MG/1
50 TABLET ORAL 4 TIMES DAILY
COMMUNITY
End: 2020-10-19

## 2020-07-15 RX ORDER — EPINEPHRINE 0.3 MG/.3ML
0.3 INJECTION SUBCUTANEOUS ONCE
Qty: 0.6 ML | Refills: 0 | Status: SHIPPED | OUTPATIENT
Start: 2020-07-15 | End: 2022-03-28 | Stop reason: SDUPTHER

## 2020-07-15 NOTE — PROGRESS NOTES
Assessment/Plan:    Angio-edema, initial encounter  Initial visit to our office for follow-up of ED admission due to angioedema likely secondary to increase tramadol dose  Patient instructed to resume previous tramadol dose of 50 mg 4 times a day prescribed by her pain management physician  Follow-up with pain management on August 5, 2020  EpiPen prescribed to patient     Screening for colon cancer  Sent home w/FOBT  Physcial apt in 2 months    Will f/u    Benign essential hypertension  BP meds were reviewed with patient and reported to be taking carvedilol and amlodipine as well as lisinopril 20 mg (per chart review, the prescribed dose is 10 mg, however the patient is a poor historian and explained that this was prescribed to her by an alternate PCP  Will request records       Diagnoses and all orders for this visit:    Angio-edema, initial encounter  -     EPINEPHrine (EPIPEN) 0 3 mg/0 3 mL SOAJ; Inject 0 3 mL (0 3 mg total) into a muscle once for 1 dose    Screening for colon cancer  -     Occult Blood, Fecal Immunochemical; Future    Benign essential hypertension    Other orders  -     traMADol (ULTRAM) 50 mg tablet; Take 50 mg by mouth 4 (four) times a day          Subjective:      Patient ID: Leanna Vega is a 75 y o  female  Patient is here today for a transition of care visit due to hospital admission on 07/10 due to angioedema likely secondary to a recent increase in tramadol dose  Patient last saw pain management on 07/08/2020 the telemedicine which resulted in an increase of tramadol from 50 mg 4 times daily to 100 mg 4 times daily  Early in the morning on 07/10/2020 the patient began experiencing acute onset of tongue and throat swelling with shortness of breath  The patient was managed in the ED with IV Benadryl IV Pepcid IV Solu-Medrol raceepinephrine nebulizer   The patient was admitted for a total of 11 hours upon which she was discharged with the instructions to stop taking tramadol completely for today and resume prior dose of 50 mg 4 times a day  She was also given a prescription for Percocet x2 days and Benadryl and famotidine for 5 days at that time  Today 7/15/20, the patient reports a complete improvement/recovery of her symptoms related to angioedema and denies swelling of the tongue, swelling of the throat, and shortness of breath  The patient was also found to have an elevated BP in the ED  BP today was 134/80 with a pulse of 80  Will continue carvedilol and amlodipine  Upon further questioning at today's visit, the patient reported taking 20 mg of lisinopril "reporting that her other PCP with managing her hypertension" patient also on carvedilol and amlodipine  It is likely that the interaction with lisinopril and high doses of tramadol caused her reaction - will give patient prescription for an EpiPen to carry with her  Will attempt to retrieve records from "other PCP office"  Also in the ED, the patient was found to have mildly elevated troponin of 0 06 subsequent values were 0 05 x2  The patient denied chest pain and EKG done in ED showed sinus tach with no evidence of MI  Will continue to monitor and follow  Ms Idalmis Carrera has a complicated past medical history of chronic pain syndrome, lumbar radiculopathy, lumbar disc degeneration, and myofascial pain syndrome  Patient was last seen by our office on 07/01/2020 where she was counseled on overdue labs and well as screening tests; all of which were ordered for her at the time  Patient has not yet completed these and was counseled on doing so  Patient is well aware that she is overdue on many screening tests and immunizations for which she has a yearly physical appointment in roughly 2 months  Will follow-up  Review of Systems   Constitutional: Negative  HENT: Negative  Eyes: Negative  Respiratory: Negative  Negative for cough, choking, chest tightness and shortness of breath      Cardiovascular: Negative for chest pain and palpitations  Gastrointestinal: Negative  Musculoskeletal: Positive for arthralgias, back pain and gait problem  See pmhx : chronic pain lumbar area    Skin: Negative  Objective:      /80 (BP Location: Left arm, Patient Position: Sitting, Cuff Size: Large)   Pulse 80   Temp (!) 97 4 °F (36 3 °C) (Temporal)   Ht 5' 1" (1 549 m)   Wt 85 6 kg (188 lb 12 8 oz)   SpO2 97%   BMI 35 67 kg/m²        Physical Exam   Constitutional: She is oriented to person, place, and time  She appears well-developed and well-nourished  HENT:   No evidence of swelling of the tongue and/or throat  Airway unobstructed  Eyes: Conjunctivae are normal    Neck: Trachea normal  Neck supple  Cardiovascular: Normal rate, regular rhythm and normal heart sounds  Pulmonary/Chest: Effort normal and breath sounds normal    Musculoskeletal:        Lumbar back: She exhibits decreased range of motion and pain  Ambulates with a pineda  Limited ROM in lumbar spine; reduced( likely 50-75%) on flexion and extension   Neurological: She is alert and oriented to person, place, and time  Skin: Skin is warm  Psychiatric: She has a normal mood and affect  Vitals reviewed        Lab Results   Component Value Date    WBC 13 67 (H) 07/10/2020    HGB 14 8 07/10/2020    HCT 45 0 07/10/2020     07/10/2020    CHOL 199 09/30/2015    TRIG 113 07/08/2020    HDL 44 (L) 07/08/2020    ALT 15 07/10/2020    AST 18 07/10/2020     09/30/2015    K 4 1 07/10/2020     07/10/2020    CREATININE 1 19 07/10/2020    BUN 15 07/10/2020    CO2 27 07/10/2020    GLUF 98 07/12/2018    HGBA1C 5 6 07/08/2020     Lab Results   Component Value Date    DSM5TGIZGQDO 1 566 09/30/2015           TCM Call (since 6/14/2020)     Date and time call was made  7/13/2020  8:40 AM    Hospital care reviewed  Records reviewed    Patient was hospitial[facility] at  [facility]    Date of Admission  07/10/20    Diagnosis  angioedema Disposition  Home    Current Symptoms  None      TCM Call (since 6/14/2020)     Post hospital issues  None    Scheduled for follow up?   Yes    Did you obtain your prescribed medications  Yes    Do you need help managing your prescriptions or medications  No    Is transportation to your appointment needed  No    I have advised the patient to call PCP with any new or worsening symptoms  ramana anderson      Living Arrangements  Alone    Support System  None    Are you recieving any outpatient services  No    Are you recieving home care services  Yes    Types of home care services  Home health aid    Have you fallen in the last 12 months  Yes    How many times  2 times     Counseling  Patient

## 2020-07-15 NOTE — PROGRESS NOTES
Depression Screening Follow-up Plan: Patient's depression screening was positive with a PHQ-2 score of 4  Their PHQ-9 score was 10  Patient requested not to follow-up on this topic at this time, explaining that "she is okay and will talk about it at her next appointment "  Of note, Patient has a past medical history of bipolar 1  Current medications include sertraline 25 mg daily and buspirone 30 mg 3xdaily  Will follow-up at next office visit is in 2 months or sooner if needed

## 2020-07-15 NOTE — PATIENT INSTRUCTIONS
Angioedema   AMBULATORY CARE:   Angioedema  is sudden swelling caused by fluid that collects in deep layers of the skin  Swelling occurs most often on the face, lips, tongue, or throat, but it can happen anywhere in the body  Common signs and symptoms:  Skin swelling may be the only symptom  Swelling may be on one or both sides of the affected area  You may also have any of the following:  · Pain and burning in the swollen area     · Hives or an itchy rash    · A cough, wheezing, and shortness of breath    · Irritated eyes and nose    · Abdominal pain  Call 911 for signs or symptoms of anaphylaxis,  such as trouble breathing, swelling in your mouth or throat, or wheezing  You may also have itching, a rash, hives, or feel like you are going to faint  Seek care immediately if:   · You have sudden behavior changes or irritability  · You are dizzy and your heart is beating faster than usual   Contact your healthcare provider if:   · Your swelling does not improve, even after you take your medicines  · You have questions or concerns about your condition or care  Steps to take for signs or symptoms of anaphylaxis:   · Immediately  give 1 shot of epinephrine only into the outer thigh muscle  · Leave the shot in place  as directed  Your healthcare provider may recommend you leave it in place for up to 10 seconds before you remove it  This helps make sure all of the epinephrine is delivered  · Call 911 and go to the emergency department,  even if the shot improved symptoms  Do not drive yourself  Bring the used epinephrine shot with you  Treatment:  Angioedema usually goes away within 3 days without treatment, but it may come back  You may need any of the following:  · Antihistamines  decrease symptoms such as itching or a rash  · Epinephrine  is medicine used to treat severe allergic reactions such as anaphylaxis  · Steroids: This medicine may be given to decrease inflammation    Safety precautions to take if you are at risk for anaphylaxis:   · Keep 2 shots of epinephrine with you at all times  You may need a second shot, because epinephrine only works for about 20 minutes and symptoms may return  Your healthcare provider can show you and family members how to give the shot  Check the expiration date every month and replace it before it expires  · Create an action plan  Your healthcare provider can help you create a written plan that explains the allergy and an emergency plan to treat a reaction  The plan explains when to give a second epinephrine shot if symptoms return or do not improve after the first  Give copies of the action plan and emergency instructions to family members, work and school staff, and  providers  Show them how to give a shot of epinephrine  · Be careful when you exercise  If you have had exercise-induced anaphylaxis, do not exercise right after you eat  Stop exercising right away if you start to develop any signs or symptoms of anaphylaxis  You may first feel tired, warm, or have itchy skin  Hives, swelling, and severe breathing problems may develop if you continue to exercise  · Carry medical alert identification  Wear medical alert jewelry or carry a card that explains the allergy  Ask your healthcare provider where to get these items  · Keep a symptom diary  Include information on how often symptoms occur, how long they last, and if they are mild or severe  Also keep information on what you ate, what happened, or which medicines you took before the swelling started  · Avoid triggers  Triggers include foods, medicines, and other items that you know cause symptoms  You may need to see a specialist, such as an allergist or dietitian, to learn what to avoid  Follow up with your healthcare provider as directed:  Write down your questions so you remember to ask them during your visits     © 2017 [employer] Information is for End User's use only and may not be sold, redistributed or otherwise used for commercial purposes  All illustrations and images included in CareNotes® are the copyrighted property of [employer]  or Deion Whitt  The above information is an  only  It is not intended as medical advice for individual conditions or treatments  Talk to your doctor, nurse or pharmacist before following any medical regimen to see if it is safe and effective for you

## 2020-07-16 ENCOUNTER — TELEPHONE (OUTPATIENT)
Dept: PAIN MEDICINE | Facility: CLINIC | Age: 65
End: 2020-07-16

## 2020-07-16 NOTE — ASSESSMENT & PLAN NOTE
BP meds were reviewed with patient and reported to be taking carvedilol and amlodipine as well as lisinopril 20 mg (per chart review, the prescribed dose is 10 mg, however the patient is a poor historian and explained that this was prescribed to her by an alternate PCP    Will request records

## 2020-07-16 NOTE — TELEPHONE ENCOUNTER
I called to speak with the patient about the results of her UDS, which was positive for THC  The patient informed me she had gone to the ED and was prescribed Percocet due to a reaction she had with tramadol and lisinopril  The patient reports taking the Percocet, but stated that she had restarted the tramadol once the Percocet ran out  The patient admitted that she does smoke marijuana to help with her arthritis  I discussed with the patient that if she continued to use marijuana we would not be able to continue to prescribe opiates and would start to wean her from the medications  The patient became very upset and said that she would get her medications from her PCP in the future

## 2020-07-16 NOTE — ASSESSMENT & PLAN NOTE
Initial visit to our office for follow-up of ED admission due to angioedema likely secondary to increase tramadol dose  Patient instructed to resume previous tramadol dose of 50 mg 4 times a day prescribed by her pain management physician  Follow-up with pain management on August 5, 2020    EpiPen prescribed to patient

## 2020-07-23 ENCOUNTER — TELEPHONE (OUTPATIENT)
Dept: FAMILY MEDICINE CLINIC | Facility: OTHER | Age: 65
End: 2020-07-23

## 2020-07-23 NOTE — TELEPHONE ENCOUNTER
[facility] denied MRI Lumbar Spine  Please review the forms that are in patient chart on what they are looking for to get it approved    Patient has MRI scheduled for 7/24/2020  Thank you

## 2020-07-27 DIAGNOSIS — F30.9 BIPOLAR I DISORDER, SINGLE MANIC EPISODE (HCC): Primary | ICD-10-CM

## 2020-07-28 RX ORDER — SERTRALINE HYDROCHLORIDE 25 MG/1
25 TABLET, FILM COATED ORAL DAILY
Qty: 30 TABLET | Refills: 5 | Status: SHIPPED | OUTPATIENT
Start: 2020-07-28 | End: 2021-01-22

## 2020-08-09 DIAGNOSIS — I10 ESSENTIAL HYPERTENSION: ICD-10-CM

## 2020-08-10 ENCOUNTER — TELEPHONE (OUTPATIENT)
Dept: FAMILY MEDICINE CLINIC | Facility: OTHER | Age: 65
End: 2020-08-10

## 2020-08-10 DIAGNOSIS — M54.16 LUMBAR RADICULOPATHY: Primary | ICD-10-CM

## 2020-08-10 DIAGNOSIS — M79.18 MYOFASCIAL PAIN SYNDROME: ICD-10-CM

## 2020-08-10 DIAGNOSIS — G89.4 PAIN SYNDROME, CHRONIC: ICD-10-CM

## 2020-08-10 RX ORDER — AMLODIPINE BESYLATE 10 MG/1
10 TABLET ORAL DAILY
Qty: 90 TABLET | Refills: 1 | Status: SHIPPED | OUTPATIENT
Start: 2020-08-10 | End: 2021-02-08 | Stop reason: SDUPTHER

## 2020-08-10 NOTE — TELEPHONE ENCOUNTER
Candy called from [facility] and she is the coordinator to help Eliseo get in the right direction    she states she needs a  Order for a shower Chair due to her Angioedema and lumbar radiculopathy    Please fax to [phone]  Rohit John    She can be reached at [phone] ext [phone]

## 2020-08-21 DIAGNOSIS — I10 BENIGN ESSENTIAL HYPERTENSION: Primary | ICD-10-CM

## 2020-08-21 RX ORDER — CARVEDILOL 25 MG/1
25 TABLET ORAL 2 TIMES DAILY WITH MEALS
Qty: 180 TABLET | Refills: 1 | Status: SHIPPED | OUTPATIENT
Start: 2020-08-21 | End: 2021-02-23 | Stop reason: SDUPTHER

## 2020-08-26 DIAGNOSIS — F30.9 BIPOLAR I DISORDER, SINGLE MANIC EPISODE (HCC): Primary | ICD-10-CM

## 2020-08-26 RX ORDER — BUSPIRONE HYDROCHLORIDE 30 MG/1
30 TABLET ORAL 3 TIMES DAILY
Qty: 90 TABLET | Refills: 1 | Status: SHIPPED | OUTPATIENT
Start: 2020-08-26 | End: 2020-10-23 | Stop reason: SDUPTHER

## 2020-09-09 ENCOUNTER — TELEPHONE (OUTPATIENT)
Dept: FAMILY MEDICINE CLINIC | Facility: OTHER | Age: 65
End: 2020-09-09

## 2020-09-09 NOTE — TELEPHONE ENCOUNTER
Left message for patient to reschedule AWV        ----- Message from Estevan Woods MD sent at 9/8/2020  3:35 PM EDT -----  Please call and reschedule her AWV visit that was missed on 8/4/20 for next available visit  Thanks

## 2020-09-15 RX ORDER — TRAMADOL HYDROCHLORIDE 50 MG/1
50 TABLET ORAL 4 TIMES DAILY
OUTPATIENT
Start: 2020-09-15

## 2020-09-15 NOTE — TELEPHONE ENCOUNTER
Please advise patient this Rx is not a regular one from our office  Also confirm with pharmacy it looks like it was prescribed by a different provider in the past   Checked PDMP and it appears not a regular Rx from [facility] [employer] by Veda Acosta  Thanks    Dr Salo Blackburn

## 2020-09-19 DIAGNOSIS — F30.9 BIPOLAR I DISORDER, SINGLE MANIC EPISODE (HCC): ICD-10-CM

## 2020-09-20 RX ORDER — BUSPIRONE HYDROCHLORIDE 30 MG/1
30 TABLET ORAL 3 TIMES DAILY
Qty: 90 TABLET | Refills: 1 | OUTPATIENT
Start: 2020-09-20

## 2020-10-19 ENCOUNTER — OFFICE VISIT (OUTPATIENT)
Dept: FAMILY MEDICINE CLINIC | Facility: OTHER | Age: 65
End: 2020-10-19
Payer: COMMERCIAL

## 2020-10-19 VITALS
OXYGEN SATURATION: 95 % | SYSTOLIC BLOOD PRESSURE: 132 MMHG | HEART RATE: 73 BPM | WEIGHT: 187.25 LBS | TEMPERATURE: 97.8 F | BODY MASS INDEX: 35.35 KG/M2 | HEIGHT: 61 IN | DIASTOLIC BLOOD PRESSURE: 78 MMHG

## 2020-10-19 DIAGNOSIS — M54.16 LUMBAR RADICULOPATHY: ICD-10-CM

## 2020-10-19 DIAGNOSIS — R10.30 LOWER ABDOMINAL PAIN: ICD-10-CM

## 2020-10-19 DIAGNOSIS — Z00.00 MEDICARE ANNUAL WELLNESS VISIT, SUBSEQUENT: Primary | ICD-10-CM

## 2020-10-19 DIAGNOSIS — I10 BENIGN ESSENTIAL HYPERTENSION: ICD-10-CM

## 2020-10-19 DIAGNOSIS — E78.2 MIXED HYPERLIPIDEMIA: ICD-10-CM

## 2020-10-19 DIAGNOSIS — Z12.11 ENCOUNTER FOR SCREENING COLONOSCOPY: ICD-10-CM

## 2020-10-19 PROCEDURE — 1101F PT FALLS ASSESS-DOCD LE1/YR: CPT | Performed by: FAMILY MEDICINE

## 2020-10-19 PROCEDURE — 1160F RVW MEDS BY RX/DR IN RCRD: CPT | Performed by: FAMILY MEDICINE

## 2020-10-19 PROCEDURE — 3075F SYST BP GE 130 - 139MM HG: CPT | Performed by: FAMILY MEDICINE

## 2020-10-19 PROCEDURE — 3078F DIAST BP <80 MM HG: CPT | Performed by: FAMILY MEDICINE

## 2020-10-19 PROCEDURE — 3288F FALL RISK ASSESSMENT DOCD: CPT | Performed by: FAMILY MEDICINE

## 2020-10-19 PROCEDURE — 90662 IIV NO PRSV INCREASED AG IM: CPT | Performed by: FAMILY MEDICINE

## 2020-10-19 PROCEDURE — 3725F SCREEN DEPRESSION PERFORMED: CPT | Performed by: FAMILY MEDICINE

## 2020-10-19 PROCEDURE — G0439 PPPS, SUBSEQ VISIT: HCPCS | Performed by: FAMILY MEDICINE

## 2020-10-19 PROCEDURE — 1125F AMNT PAIN NOTED PAIN PRSNT: CPT | Performed by: FAMILY MEDICINE

## 2020-10-19 PROCEDURE — 1170F FXNL STATUS ASSESSED: CPT | Performed by: FAMILY MEDICINE

## 2020-10-19 PROCEDURE — G0008 ADMIN INFLUENZA VIRUS VAC: HCPCS | Performed by: FAMILY MEDICINE

## 2020-10-19 RX ORDER — PHENOL 1.4 %
10 AEROSOL, SPRAY (ML) MUCOUS MEMBRANE
COMMUNITY
End: 2022-03-28

## 2020-10-19 RX ORDER — METHOCARBAMOL 500 MG/1
500 TABLET, FILM COATED ORAL 2 TIMES DAILY PRN
Qty: 60 TABLET | Refills: 1 | Status: SHIPPED | OUTPATIENT
Start: 2020-10-19 | End: 2021-02-24 | Stop reason: SDUPTHER

## 2020-10-19 RX ORDER — METHOCARBAMOL 500 MG/1
500 TABLET, FILM COATED ORAL 2 TIMES DAILY PRN
COMMUNITY
End: 2020-10-19 | Stop reason: SDUPTHER

## 2020-10-21 ENCOUNTER — DOCUMENTATION (OUTPATIENT)
Dept: FAMILY MEDICINE CLINIC | Facility: OTHER | Age: 65
End: 2020-10-21

## 2020-10-21 DIAGNOSIS — M54.16 LUMBAR RADICULOPATHY: ICD-10-CM

## 2020-10-22 DIAGNOSIS — F30.9 BIPOLAR I DISORDER, SINGLE MANIC EPISODE (HCC): ICD-10-CM

## 2020-10-22 RX ORDER — BUSPIRONE HYDROCHLORIDE 30 MG/1
30 TABLET ORAL 3 TIMES DAILY
Qty: 90 TABLET | Refills: 1 | OUTPATIENT
Start: 2020-10-22

## 2020-10-23 DIAGNOSIS — F30.9 BIPOLAR I DISORDER, SINGLE MANIC EPISODE (HCC): ICD-10-CM

## 2020-10-23 RX ORDER — BUSPIRONE HYDROCHLORIDE 30 MG/1
30 TABLET ORAL 3 TIMES DAILY
Qty: 90 TABLET | Refills: 1 | OUTPATIENT
Start: 2020-10-23

## 2020-10-23 RX ORDER — BUSPIRONE HYDROCHLORIDE 30 MG/1
30 TABLET ORAL 3 TIMES DAILY
Qty: 90 TABLET | Refills: 1 | Status: SHIPPED | OUTPATIENT
Start: 2020-10-23 | End: 2020-12-29 | Stop reason: SDUPTHER

## 2020-10-26 ENCOUNTER — HOSPITAL ENCOUNTER (OUTPATIENT)
Dept: ULTRASOUND IMAGING | Facility: HOSPITAL | Age: 65
Discharge: HOME | End: 2020-10-26
Attending: FAMILY MEDICINE
Payer: COMMERCIAL

## 2020-10-26 DIAGNOSIS — R10.30 LOWER ABDOMINAL PAIN: ICD-10-CM

## 2020-10-26 PROCEDURE — 76830 TRANSVAGINAL US NON-OB: CPT

## 2020-10-26 PROCEDURE — 76856 US EXAM PELVIC COMPLETE: CPT

## 2020-10-30 ENCOUNTER — TELEPHONE (OUTPATIENT)
Dept: FAMILY MEDICINE CLINIC | Facility: OTHER | Age: 65
End: 2020-10-30

## 2020-10-30 DIAGNOSIS — R93.89 ENDOMETRIAL THICKENING ON ULTRASOUND: Primary | ICD-10-CM

## 2020-11-02 ENCOUNTER — TELEPHONE (OUTPATIENT)
Dept: FAMILY MEDICINE CLINIC | Facility: OTHER | Age: 65
End: 2020-11-02

## 2020-11-03 ENCOUNTER — OFFICE VISIT (OUTPATIENT)
Dept: OBGYN CLINIC | Facility: CLINIC | Age: 65
End: 2020-11-03
Payer: COMMERCIAL

## 2020-11-03 VITALS
DIASTOLIC BLOOD PRESSURE: 76 MMHG | WEIGHT: 187.6 LBS | TEMPERATURE: 98.3 F | SYSTOLIC BLOOD PRESSURE: 138 MMHG | BODY MASS INDEX: 35.42 KG/M2 | HEIGHT: 61 IN

## 2020-11-03 DIAGNOSIS — R93.89 ENDOMETRIAL THICKENING ON ULTRASOUND: ICD-10-CM

## 2020-11-03 PROCEDURE — 3008F BODY MASS INDEX DOCD: CPT | Performed by: FAMILY MEDICINE

## 2020-11-03 PROCEDURE — 3075F SYST BP GE 130 - 139MM HG: CPT | Performed by: OBSTETRICS & GYNECOLOGY

## 2020-11-03 PROCEDURE — 99203 OFFICE O/P NEW LOW 30 MIN: CPT | Performed by: OBSTETRICS & GYNECOLOGY

## 2020-11-03 PROCEDURE — 3078F DIAST BP <80 MM HG: CPT | Performed by: OBSTETRICS & GYNECOLOGY

## 2020-11-05 ENCOUNTER — CLINICAL SUPPORT (OUTPATIENT)
Dept: FAMILY MEDICINE CLINIC | Facility: OTHER | Age: 65
End: 2020-11-05
Payer: COMMERCIAL

## 2020-11-05 DIAGNOSIS — Z23 ENCOUNTER FOR IMMUNIZATION: Primary | ICD-10-CM

## 2020-11-05 PROCEDURE — G0009 ADMIN PNEUMOCOCCAL VACCINE: HCPCS

## 2020-11-05 PROCEDURE — 90670 PCV13 VACCINE IM: CPT

## 2020-11-14 ENCOUNTER — HOSPITAL ENCOUNTER (OUTPATIENT)
Dept: MRI IMAGING | Facility: HOSPITAL | Age: 65
Discharge: HOME | End: 2020-11-14
Attending: NURSE PRACTITIONER
Payer: COMMERCIAL

## 2020-11-14 DIAGNOSIS — M51.36 DISC DEGENERATION, LUMBAR: ICD-10-CM

## 2020-11-14 DIAGNOSIS — M47.816 SPONDYLOSIS OF LUMBAR REGION WITHOUT MYELOPATHY OR RADICULOPATHY: ICD-10-CM

## 2020-11-14 DIAGNOSIS — M54.16 LUMBAR RADICULOPATHY: ICD-10-CM

## 2020-11-14 DIAGNOSIS — G89.4 PAIN SYNDROME, CHRONIC: ICD-10-CM

## 2020-11-14 PROCEDURE — 72148 MRI LUMBAR SPINE W/O DYE: CPT

## 2020-11-18 ENCOUNTER — TELEPHONE (OUTPATIENT)
Dept: FAMILY MEDICINE CLINIC | Facility: OTHER | Age: 65
End: 2020-11-18

## 2020-11-18 ENCOUNTER — TELEPHONE (OUTPATIENT)
Dept: NEUROSURGERY | Facility: CLINIC | Age: 65
End: 2020-11-18

## 2020-11-24 ENCOUNTER — TELEMEDICINE (OUTPATIENT)
Dept: FAMILY MEDICINE CLINIC | Facility: OTHER | Age: 65
End: 2020-11-24
Payer: COMMERCIAL

## 2020-11-24 DIAGNOSIS — I10 BENIGN ESSENTIAL HYPERTENSION: ICD-10-CM

## 2020-11-24 DIAGNOSIS — N28.9 ACUTE RENAL INSUFFICIENCY: Primary | ICD-10-CM

## 2020-11-24 PROCEDURE — 99213 OFFICE O/P EST LOW 20 MIN: CPT | Performed by: FAMILY MEDICINE

## 2020-11-24 PROCEDURE — 4040F PNEUMOC VAC/ADMIN/RCVD: CPT | Performed by: FAMILY MEDICINE

## 2020-11-24 PROCEDURE — 1160F RVW MEDS BY RX/DR IN RCRD: CPT | Performed by: FAMILY MEDICINE

## 2020-12-22 DIAGNOSIS — M54.16 LUMBAR RADICULOPATHY: ICD-10-CM

## 2020-12-23 RX ORDER — METHOCARBAMOL 500 MG/1
500 TABLET, FILM COATED ORAL 2 TIMES DAILY PRN
Qty: 60 TABLET | Refills: 1 | OUTPATIENT
Start: 2020-12-23

## 2020-12-26 DIAGNOSIS — F30.9 BIPOLAR I DISORDER, SINGLE MANIC EPISODE (HCC): ICD-10-CM

## 2020-12-28 RX ORDER — BUSPIRONE HYDROCHLORIDE 30 MG/1
30 TABLET ORAL 3 TIMES DAILY
Qty: 90 TABLET | Refills: 1 | OUTPATIENT
Start: 2020-12-28

## 2020-12-29 DIAGNOSIS — F30.9 BIPOLAR I DISORDER, SINGLE MANIC EPISODE (HCC): ICD-10-CM

## 2020-12-29 DIAGNOSIS — M54.16 LUMBAR RADICULOPATHY: ICD-10-CM

## 2020-12-29 DIAGNOSIS — M51.36 DISC DEGENERATION, LUMBAR: ICD-10-CM

## 2020-12-29 RX ORDER — BUSPIRONE HYDROCHLORIDE 30 MG/1
30 TABLET ORAL 3 TIMES DAILY
Qty: 90 TABLET | Refills: 1 | Status: SHIPPED | OUTPATIENT
Start: 2020-12-29 | End: 2021-02-24 | Stop reason: SDUPTHER

## 2020-12-29 RX ORDER — GABAPENTIN 600 MG/1
600 TABLET ORAL 3 TIMES DAILY
Qty: 270 TABLET | Refills: 1 | Status: SHIPPED | OUTPATIENT
Start: 2020-12-29 | End: 2021-07-07 | Stop reason: SDUPTHER

## 2021-01-10 DIAGNOSIS — I10 ESSENTIAL HYPERTENSION: ICD-10-CM

## 2021-01-10 DIAGNOSIS — I10 BENIGN ESSENTIAL HYPERTENSION: ICD-10-CM

## 2021-01-10 DIAGNOSIS — F30.9 BIPOLAR I DISORDER, SINGLE MANIC EPISODE (HCC): ICD-10-CM

## 2021-01-11 RX ORDER — SERTRALINE HYDROCHLORIDE 25 MG/1
TABLET, FILM COATED ORAL
Qty: 90 TABLET | Refills: 1 | OUTPATIENT
Start: 2021-01-11

## 2021-01-11 RX ORDER — AMLODIPINE BESYLATE 10 MG/1
TABLET ORAL
Qty: 90 TABLET | Refills: 1 | OUTPATIENT
Start: 2021-01-11

## 2021-01-11 RX ORDER — CARVEDILOL 25 MG/1
TABLET ORAL
Qty: 180 TABLET | Refills: 1 | OUTPATIENT
Start: 2021-01-11

## 2021-01-16 DIAGNOSIS — F30.9 BIPOLAR I DISORDER, SINGLE MANIC EPISODE (HCC): ICD-10-CM

## 2021-01-18 RX ORDER — SERTRALINE HYDROCHLORIDE 25 MG/1
TABLET, FILM COATED ORAL
Qty: 90 TABLET | Refills: 1 | OUTPATIENT
Start: 2021-01-18

## 2021-01-21 DIAGNOSIS — F30.9 BIPOLAR I DISORDER, SINGLE MANIC EPISODE (HCC): ICD-10-CM

## 2021-01-22 RX ORDER — SERTRALINE HYDROCHLORIDE 25 MG/1
TABLET, FILM COATED ORAL
Qty: 90 TABLET | Refills: 1 | Status: SHIPPED | OUTPATIENT
Start: 2021-01-22 | End: 2021-04-02

## 2021-02-08 DIAGNOSIS — I10 ESSENTIAL HYPERTENSION: ICD-10-CM

## 2021-02-08 RX ORDER — AMLODIPINE BESYLATE 10 MG/1
10 TABLET ORAL DAILY
Qty: 90 TABLET | Refills: 1 | Status: SHIPPED | OUTPATIENT
Start: 2021-02-08 | End: 2021-08-02 | Stop reason: SDUPTHER

## 2021-02-16 DIAGNOSIS — F30.9 BIPOLAR I DISORDER, SINGLE MANIC EPISODE (HCC): ICD-10-CM

## 2021-02-16 RX ORDER — BUSPIRONE HYDROCHLORIDE 30 MG/1
30 TABLET ORAL 3 TIMES DAILY
Qty: 90 TABLET | Refills: 1 | OUTPATIENT
Start: 2021-02-16

## 2021-02-23 DIAGNOSIS — I10 BENIGN ESSENTIAL HYPERTENSION: ICD-10-CM

## 2021-02-23 RX ORDER — CARVEDILOL 25 MG/1
25 TABLET ORAL 2 TIMES DAILY WITH MEALS
Qty: 180 TABLET | Refills: 1 | Status: SHIPPED | OUTPATIENT
Start: 2021-02-23 | End: 2021-08-20 | Stop reason: SDUPTHER

## 2021-02-24 DIAGNOSIS — M54.16 LUMBAR RADICULOPATHY: ICD-10-CM

## 2021-02-24 DIAGNOSIS — F30.9 BIPOLAR I DISORDER, SINGLE MANIC EPISODE (HCC): ICD-10-CM

## 2021-02-24 RX ORDER — METHOCARBAMOL 500 MG/1
500 TABLET, FILM COATED ORAL 2 TIMES DAILY PRN
Qty: 60 TABLET | Refills: 1 | Status: SHIPPED | OUTPATIENT
Start: 2021-02-24 | End: 2022-03-28 | Stop reason: SDUPTHER

## 2021-02-24 RX ORDER — BUSPIRONE HYDROCHLORIDE 30 MG/1
30 TABLET ORAL 3 TIMES DAILY
Qty: 90 TABLET | Refills: 1 | Status: SHIPPED | OUTPATIENT
Start: 2021-02-24 | End: 2021-04-26 | Stop reason: SDUPTHER

## 2021-02-25 ENCOUNTER — HOSPITAL ENCOUNTER (OUTPATIENT)
Dept: RADIOLOGY | Facility: HOSPITAL | Age: 66
Discharge: HOME | End: 2021-02-25
Payer: COMMERCIAL

## 2021-02-25 DIAGNOSIS — Z13.820 SCREENING FOR OSTEOPOROSIS: ICD-10-CM

## 2021-02-25 PROCEDURE — 77080 DXA BONE DENSITY AXIAL: CPT

## 2021-02-26 ENCOUNTER — TELEPHONE (OUTPATIENT)
Dept: FAMILY MEDICINE CLINIC | Facility: OTHER | Age: 66
End: 2021-02-26

## 2021-02-26 NOTE — TELEPHONE ENCOUNTER
Pt notified      ----- Message from Alina Taylor MD sent at 2/26/2021  9:40 AM EST -----  1  Bone scan result is normal   2  A daily intake of calcium of at least 1200 mg and vitamin D, 800-1000 IU, as well as weight bearing and muscle strengthening exercise, fall prevention and avoidance of tobacco and excessive alcohol intake as basic preventive measures are recommended  3  Repeat DXA scan on the same equipment in 18-24 months as clinically indicated

## 2021-03-02 ENCOUNTER — RA CDI HCC (OUTPATIENT)
Dept: OTHER | Facility: HOSPITAL | Age: 66
End: 2021-03-02

## 2021-03-02 NOTE — PROGRESS NOTES
[place] [id number]  coding oppertunities             Chart reviewed, (number of) suggestions sent to provider: 1     Problem listed updated   Provider Accepted, (number of) suggestions accepted: 1              [place] [address]  coding oppertunities             Chart reviewed, (number of) suggestions sent to provider: 1                 F30 9: Bipolar I disorder, single manic episode ([place] [id number] ) -

## 2021-03-04 ENCOUNTER — APPOINTMENT (OUTPATIENT)
Dept: LAB | Facility: CLINIC | Age: 66
End: 2021-03-04
Payer: COMMERCIAL

## 2021-03-04 ENCOUNTER — TRANSCRIBE ORDERS (OUTPATIENT)
Dept: LAB | Facility: CLINIC | Age: 66
End: 2021-03-04

## 2021-03-04 DIAGNOSIS — I10 BENIGN ESSENTIAL HYPERTENSION: ICD-10-CM

## 2021-03-04 LAB
ALBUMIN SERPL BCP-MCNC: 3.2 G/DL (ref 3.5–5)
ALP SERPL-CCNC: 77 U/L (ref 46–116)
ALT SERPL W P-5'-P-CCNC: 20 U/L (ref 12–78)
ANION GAP SERPL CALCULATED.3IONS-SCNC: 7 MMOL/L (ref 4–13)
AST SERPL W P-5'-P-CCNC: 12 U/L (ref 5–45)
BASOPHILS # BLD AUTO: 0.06 THOUSANDS/ΜL (ref 0–0.1)
BASOPHILS NFR BLD AUTO: 1 % (ref 0–1)
BILIRUB SERPL-MCNC: 0.18 MG/DL (ref 0.2–1)
BUN SERPL-MCNC: 24 MG/DL (ref 5–25)
CALCIUM ALBUM COR SERPL-MCNC: 10.1 MG/DL (ref 8.3–10.1)
CALCIUM SERPL-MCNC: 9.5 MG/DL (ref 8.3–10.1)
CHLORIDE SERPL-SCNC: 109 MMOL/L (ref 100–108)
CO2 SERPL-SCNC: 27 MMOL/L (ref 21–32)
CREAT SERPL-MCNC: 1.38 MG/DL (ref 0.6–1.3)
EOSINOPHIL # BLD AUTO: 0.45 THOUSAND/ΜL (ref 0–0.61)
EOSINOPHIL NFR BLD AUTO: 6 % (ref 0–6)
ERYTHROCYTE [DISTWIDTH] IN BLOOD BY AUTOMATED COUNT: 15.6 % (ref 11.6–15.1)
GFR SERPL CREATININE-BSD FRML MDRD: 46 ML/MIN/1.73SQ M
GLUCOSE SERPL-MCNC: 97 MG/DL (ref 65–140)
HCT VFR BLD AUTO: 46.1 % (ref 34.8–46.1)
HGB BLD-MCNC: 14.4 G/DL (ref 11.5–15.4)
IMM GRANULOCYTES # BLD AUTO: 0.03 THOUSAND/UL (ref 0–0.2)
IMM GRANULOCYTES NFR BLD AUTO: 0 % (ref 0–2)
LYMPHOCYTES # BLD AUTO: 2.17 THOUSANDS/ΜL (ref 0.6–4.47)
LYMPHOCYTES NFR BLD AUTO: 27 % (ref 14–44)
MCH RBC QN AUTO: 30.1 PG (ref 26.8–34.3)
MCHC RBC AUTO-ENTMCNC: 31.2 G/DL (ref 31.4–37.4)
MCV RBC AUTO: 96 FL (ref 82–98)
MONOCYTES # BLD AUTO: 0.75 THOUSAND/ΜL (ref 0.17–1.22)
MONOCYTES NFR BLD AUTO: 9 % (ref 4–12)
NEUTROPHILS # BLD AUTO: 4.55 THOUSANDS/ΜL (ref 1.85–7.62)
NEUTS SEG NFR BLD AUTO: 57 % (ref 43–75)
NRBC BLD AUTO-RTO: 0 /100 WBCS
PLATELET # BLD AUTO: 245 THOUSANDS/UL (ref 149–390)
PMV BLD AUTO: 9.9 FL (ref 8.9–12.7)
POTASSIUM SERPL-SCNC: 4.6 MMOL/L (ref 3.5–5.3)
PROT SERPL-MCNC: 8.5 G/DL (ref 6.4–8.2)
RBC # BLD AUTO: 4.78 MILLION/UL (ref 3.81–5.12)
SODIUM SERPL-SCNC: 143 MMOL/L (ref 136–145)
TSH SERPL DL<=0.05 MIU/L-ACNC: 2.42 UIU/ML (ref 0.36–3.74)
WBC # BLD AUTO: 8.01 THOUSAND/UL (ref 4.31–10.16)

## 2021-03-04 PROCEDURE — 85025 COMPLETE CBC W/AUTO DIFF WBC: CPT

## 2021-03-04 PROCEDURE — 84443 ASSAY THYROID STIM HORMONE: CPT

## 2021-03-04 PROCEDURE — 36415 COLL VENOUS BLD VENIPUNCTURE: CPT

## 2021-03-04 PROCEDURE — 80053 COMPREHEN METABOLIC PANEL: CPT

## 2021-03-09 ENCOUNTER — OFFICE VISIT (OUTPATIENT)
Dept: FAMILY MEDICINE CLINIC | Facility: OTHER | Age: 66
End: 2021-03-09
Payer: COMMERCIAL

## 2021-03-09 VITALS
DIASTOLIC BLOOD PRESSURE: 82 MMHG | BODY MASS INDEX: 37.53 KG/M2 | HEART RATE: 85 BPM | TEMPERATURE: 97.8 F | OXYGEN SATURATION: 98 % | SYSTOLIC BLOOD PRESSURE: 140 MMHG | HEIGHT: 61 IN | WEIGHT: 198.8 LBS | RESPIRATION RATE: 18 BRPM

## 2021-03-09 DIAGNOSIS — M47.26 OSTEOARTHRITIS OF SPINE WITH RADICULOPATHY, LUMBAR REGION: ICD-10-CM

## 2021-03-09 DIAGNOSIS — I10 BENIGN ESSENTIAL HYPERTENSION: Primary | ICD-10-CM

## 2021-03-09 DIAGNOSIS — N18.31 STAGE 3A CHRONIC KIDNEY DISEASE (HCC): ICD-10-CM

## 2021-03-09 DIAGNOSIS — Z12.31 ENCOUNTER FOR SCREENING MAMMOGRAM FOR MALIGNANT NEOPLASM OF BREAST: ICD-10-CM

## 2021-03-09 PROCEDURE — 99214 OFFICE O/P EST MOD 30 MIN: CPT | Performed by: FAMILY MEDICINE

## 2021-03-09 RX ORDER — LISINOPRIL 2.5 MG/1
2.5 TABLET ORAL DAILY
Qty: 90 TABLET | Refills: 0 | Status: SHIPPED | OUTPATIENT
Start: 2021-03-09 | End: 2021-06-09 | Stop reason: SDUPTHER

## 2021-03-09 NOTE — PROGRESS NOTES
Assessment/Plan:    No problem-specific Assessment & Plan notes found for this encounter  Problem List Items Addressed This Visit        Cardiovascular and Mediastinum    Benign essential hypertension - Primary    Relevant Medications  Not controlled well  Will add the following in addition to her previous medication  Low salt diet re-enforced  FU routine  lisinopril (ZESTRIL) 2 5 mg tablet    Other Relevant Orders    CBC and differential    Comprehensive metabolic panel    UA w Reflex to Microscopic w Reflex to Culture -Lab Collect      Other Visit Diagnoses     Osteoarthritis of spine with radiculopathy, lumbar region        Relevant Orders:  Worsening of pain  Takes tylenol prn     Ambulatory referral to Neurosurgery    Encounter for screening mammogram for malignant neoplasm of breast        Relevant Orders    Mammo screening bilateral w 3d & cad    Stage 3a chronic kidney disease        Relevant Orders  Stable and will monitor labs  Avoid NSAIDs and keep hydrated      CBC and differential    Comprehensive metabolic panel    UA w Reflex to Microscopic w Reflex to Culture -Lab Collect        BMI Counseling: Body mass index is 37 56 kg/m²  The BMI is above normal  Nutrition recommendations include encouraging healthy choices of fruits and vegetables, limiting drinks that contain sugar and reducing intake of cholesterol  Subjective:      Patient ID: Eduardo Cortés is a 77 y o  female  Hypertension  Patient is here for follow-up of elevated blood pressure  She is exercising and is adherent to a low-salt diet  Blood pressure is not well controlled at home  Cardiac symptoms: none  Patient denies chest pain, chest pressure/discomfort, lower extremity edema and palpitations  Cardiovascular risk factors: advanced age (older than 54 for men, 72 for women), hypertension, obesity (BMI >= 30 kg/m2) and sedentary lifestyle  Use of agents associated with hypertension: none   History of target organ damage: none  Rheumatoid Arthritis  Patient complains of back pain and DJD  Symptoms have been present for several years and persistent  Symptoms include joint pain, morning stiffness and pain of the back and leg and are of moderate severity  Patient denies joint swelling  Symptoms are made worse by: walking and activity and movement  Patient denies associated fevers and rashes/photosensitive  Overall disease activity:  worse  Limitation on activities include difficulty with walking, difficulty with getting dressed and difficulty with ADLs  Patient has had MRI study which reveals moderate changes along with compression fracture  Patient would like to have a referral to neurosurgery  Per labs CKD is present and creatinine is at baseline  She has no decrease in urination noted  Feels overall good with no back pain or change in voiding pattern  Will recheck labs routine and recommend she stays hydrated and avoid NSAIDs  Patient takes tylenol prn for discomfort if need arises for other arthralgia  The following portions of the patient's history were reviewed and updated as appropriate:   She  has a past medical history of Anxiety, Depression, Fatty liver, Hyperlipidemia, Hypertension, Psychiatric disorder, and Varicella    She   Patient Active Problem List    Diagnosis Date Noted    Endometrial thickening on ultrasound 11/03/2020    Screening for colon cancer 07/15/2020    Angio-edema, initial encounter 07/10/2020    Elevated troponin 07/10/2020    Leg cramps, sleep related 04/06/2016    Pain syndrome, chronic 06/04/2014    Pain of lower leg 12/11/2013    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    Disc degeneration, lumbar 08/23/2013    Lumbar radiculopathy 08/23/2013    Myofascial pain syndrome 08/23/2013    Spondylosis of lumbar region without myelopathy or radiculopathy 08/23/2013    Backache 04/10/2013    Benign essential hypertension 05/11/2012    Bipolar I disorder, single manic episode ([facility] [address] ) 2012    Hyperlipidemia 2012    Lower back pain 2012     She  has a past surgical history that includes Breast surgery (Bilateral);  section; Dilation and curettage of uterus; Cardiac surgery; and Ectopic pregnancy surgery  Her family history includes Bipolar disorder in her sister; Cirrhosis in her father; Diabetes in her family; Heart disease in her family and sister; Hypertension in her family and sister; Lung cancer in her mother; Stroke in her family; Thyroid disease in her family  She  reports that she has been smoking  She has never used smokeless tobacco  She reports previous alcohol use  She reports current drug use  Drug: Marijuana    Current Outpatient Medications   Medication Sig Dispense Refill    amLODIPine (NORVASC) 10 mg tablet Take 1 tablet (10 mg total) by mouth daily 90 tablet 1    aspirin (ECOTRIN LOW STRENGTH) 81 mg EC tablet Take 81 mg by mouth daily      busPIRone (BUSPAR) 30 MG tablet Take 1 tablet (30 mg total) by mouth 3 (three) times a day 90 tablet 1    carvedilol (COREG) 25 mg tablet Take 1 tablet (25 mg total) by mouth 2 (two) times a day with meals 180 tablet 1    diclofenac sodium (VOLTAREN) 1 % APPLY 2 GRAMS TO AFFECTED AREA 4 TIMES A  g 1    gabapentin (NEURONTIN) 600 MG tablet Take 1 tablet (600 mg total) by mouth 3 (three) times a day 270 tablet 1    Melatonin 10 MG TABS Take 10 mg by mouth daily at bedtime      methocarbamol (ROBAXIN) 500 mg tablet Take 1 tablet (500 mg total) by mouth 2 (two) times a day as needed for muscle spasms 60 tablet 1    sertraline (ZOLOFT) 25 mg tablet TAKE 1 TABLET BY MOUTH EVERY DAY 90 tablet 1    diphenhydrAMINE (BENADRYL) 25 mg tablet Take 1 tablet (25 mg total) by mouth every 6 (six) hours as needed for itching for up to 5 days 20 tablet 0    EPINEPHrine (EPIPEN) 0 3 mg/0 3 mL SOAJ Inject 0 3 mL (0 3 mg total) into a muscle once for 1 dose 0 6 mL 0    famotidine (PEPCID) 20 mg tablet Take 1 tablet (20 mg total) by mouth daily for 5 days 5 tablet 0    lisinopril (ZESTRIL) 2 5 mg tablet Take 1 tablet (2 5 mg total) by mouth daily 90 tablet 0     No current facility-administered medications for this visit  Current Outpatient Medications on File Prior to Visit   Medication Sig    amLODIPine (NORVASC) 10 mg tablet Take 1 tablet (10 mg total) by mouth daily    aspirin (ECOTRIN LOW STRENGTH) 81 mg EC tablet Take 81 mg by mouth daily    busPIRone (BUSPAR) 30 MG tablet Take 1 tablet (30 mg total) by mouth 3 (three) times a day    carvedilol (COREG) 25 mg tablet Take 1 tablet (25 mg total) by mouth 2 (two) times a day with meals    diclofenac sodium (VOLTAREN) 1 % APPLY 2 GRAMS TO AFFECTED AREA 4 TIMES A DAY    gabapentin (NEURONTIN) 600 MG tablet Take 1 tablet (600 mg total) by mouth 3 (three) times a day    Melatonin 10 MG TABS Take 10 mg by mouth daily at bedtime    methocarbamol (ROBAXIN) 500 mg tablet Take 1 tablet (500 mg total) by mouth 2 (two) times a day as needed for muscle spasms    sertraline (ZOLOFT) 25 mg tablet TAKE 1 TABLET BY MOUTH EVERY DAY    diphenhydrAMINE (BENADRYL) 25 mg tablet Take 1 tablet (25 mg total) by mouth every 6 (six) hours as needed for itching for up to 5 days    EPINEPHrine (EPIPEN) 0 3 mg/0 3 mL SOAJ Inject 0 3 mL (0 3 mg total) into a muscle once for 1 dose    famotidine (PEPCID) 20 mg tablet Take 1 tablet (20 mg total) by mouth daily for 5 days     No current facility-administered medications on file prior to visit  She is allergic to methyldopa       Review of Systems   Constitutional: Negative for chills, fever and unexpected weight change  HENT: Negative for congestion  Respiratory: Negative for cough and shortness of breath  Cardiovascular: Negative for chest pain and leg swelling  Gastrointestinal: Negative for abdominal pain  Genitourinary: Negative for dysuria and flank pain     Musculoskeletal: Positive for arthralgias, back pain and myalgias  Neurological: Negative for tremors and weakness  Objective:      /82   Pulse 85   Temp 97 8 °F (36 6 °C)   Resp 18   Ht 5' 1" (1 549 m)   Wt 90 2 kg (198 lb 12 8 oz)   SpO2 98%   BMI 37 56 kg/m²          Physical Exam  Constitutional:       General: She is not in acute distress  HENT:      Head: Normocephalic and atraumatic  Neck:      Musculoskeletal: Normal range of motion and neck supple  Cardiovascular:      Rate and Rhythm: Normal rate  Pulses: Normal pulses  Heart sounds: Normal heart sounds  No murmur  Pulmonary:      Effort: Pulmonary effort is normal  No respiratory distress  Breath sounds: Normal breath sounds  No wheezing  Musculoskeletal:      Right lower leg: No edema  Left lower leg: No edema  Neurological:      General: No focal deficit present  Mental Status: She is alert and oriented to person, place, and time           Lab Results   Component Value Date    WBC 8 01 03/04/2021    HGB 14 4 03/04/2021    HCT 46 1 03/04/2021     03/04/2021    CHOL 199 09/30/2015    TRIG 113 07/08/2020    HDL 44 (L) 07/08/2020    ALT 20 03/04/2021    AST 12 03/04/2021     09/30/2015    K 4 6 03/04/2021     (H) 03/04/2021    CREATININE 1 38 (H) 03/04/2021    BUN 24 03/04/2021    CO2 27 03/04/2021    GLUF 98 07/12/2018    HGBA1C 5 6 07/08/2020

## 2021-03-15 ENCOUNTER — OFFICE VISIT (OUTPATIENT)
Dept: NEUROSURGERY | Facility: CLINIC | Age: 66
End: 2021-03-15
Payer: COMMERCIAL

## 2021-03-15 VITALS
RESPIRATION RATE: 16 BRPM | TEMPERATURE: 99.2 F | WEIGHT: 200 LBS | DIASTOLIC BLOOD PRESSURE: 80 MMHG | HEIGHT: 61 IN | HEART RATE: 73 BPM | BODY MASS INDEX: 37.76 KG/M2 | SYSTOLIC BLOOD PRESSURE: 146 MMHG

## 2021-03-15 DIAGNOSIS — M89.9 BONE LESION: Primary | ICD-10-CM

## 2021-03-15 DIAGNOSIS — M47.26 OSTEOARTHRITIS OF SPINE WITH RADICULOPATHY, LUMBAR REGION: ICD-10-CM

## 2021-03-15 DIAGNOSIS — M54.40 LOW BACK PAIN WITH SCIATICA, SCIATICA LATERALITY UNSPECIFIED, UNSPECIFIED BACK PAIN LATERALITY, UNSPECIFIED CHRONICITY: ICD-10-CM

## 2021-03-15 PROCEDURE — 99214 OFFICE O/P EST MOD 30 MIN: CPT | Performed by: NEUROLOGICAL SURGERY

## 2021-03-15 PROCEDURE — 3077F SYST BP >= 140 MM HG: CPT | Performed by: NEUROLOGICAL SURGERY

## 2021-03-15 PROCEDURE — 1160F RVW MEDS BY RX/DR IN RCRD: CPT | Performed by: NEUROLOGICAL SURGERY

## 2021-03-15 PROCEDURE — 3008F BODY MASS INDEX DOCD: CPT | Performed by: NEUROLOGICAL SURGERY

## 2021-03-15 PROCEDURE — 3079F DIAST BP 80-89 MM HG: CPT | Performed by: NEUROLOGICAL SURGERY

## 2021-03-15 NOTE — PROGRESS NOTES
Assessment/Plan:    No problem-specific Assessment & Plan notes found for this encounter  Problem List Items Addressed This Visit        Other    Backache    Relevant Orders    Ambulatory referral to Neurosurgery      Other Visit Diagnoses     Bone lesion    -  Primary    Relevant Orders    Ambulatory referral to Neurosurgery    Osteoarthritis of spine with radiculopathy, lumbar region        Relevant Orders    Ambulatory referral to Neurosurgery            Subjective:      Patient ID: Magaly De Guzman is a 77 y o  female  HPI    The following portions of the patient's history were reviewed and updated as appropriate:   She  has a past medical history of Anxiety, Depression, Fatty liver, Hyperlipidemia, Hypertension, Psychiatric disorder, and Varicella  She   Patient Active Problem List    Diagnosis Date Noted    Endometrial thickening on ultrasound 2020    Screening for colon cancer 07/15/2020    Angio-edema, initial encounter 07/10/2020    Elevated troponin 07/10/2020    Leg cramps, sleep related 2016    Pain syndrome, chronic 2014    Pain of lower leg 2013    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    Disc degeneration, lumbar 2013    Lumbar radiculopathy 2013    Myofascial pain syndrome 2013    Spondylosis of lumbar region without myelopathy or radiculopathy 2013    Backache 04/10/2013    Benign essential hypertension 2012    Bipolar I disorder, single manic episode ([facility] [address] ) 2012    Hyperlipidemia 2012    Lower back pain 2012     She  has a past surgical history that includes Breast surgery (Bilateral);  section; Dilation and curettage of uterus; Cardiac surgery; and Ectopic pregnancy surgery  Her family history includes Bipolar disorder in her sister; Cirrhosis in her father; Diabetes in her family; Heart disease in her family and sister;  Hypertension in her family and sister; Lung cancer in her mother; Stroke in her family; Thyroid disease in her family  She  reports that she has been smoking  She has been smoking about 1 00 pack per day  She has never used smokeless tobacco  She reports previous alcohol use  She reports current drug use  Drug: Marijuana  Current Outpatient Medications   Medication Sig Dispense Refill    amLODIPine (NORVASC) 10 mg tablet Take 1 tablet (10 mg total) by mouth daily 90 tablet 1    aspirin (ECOTRIN LOW STRENGTH) 81 mg EC tablet Take 81 mg by mouth daily      ASPIRIN-ACETAMINOPHEN-CAFFEINE PO Take by mouth      busPIRone (BUSPAR) 30 MG tablet Take 1 tablet (30 mg total) by mouth 3 (three) times a day 90 tablet 1    carvedilol (COREG) 25 mg tablet Take 1 tablet (25 mg total) by mouth 2 (two) times a day with meals 180 tablet 1    gabapentin (NEURONTIN) 600 MG tablet Take 1 tablet (600 mg total) by mouth 3 (three) times a day 270 tablet 1    lisinopril (ZESTRIL) 2 5 mg tablet Take 1 tablet (2 5 mg total) by mouth daily 90 tablet 0    methocarbamol (ROBAXIN) 500 mg tablet Take 1 tablet (500 mg total) by mouth 2 (two) times a day as needed for muscle spasms 60 tablet 1    NON FORMULARY Hempvana roll on cream for pain      sertraline (ZOLOFT) 25 mg tablet TAKE 1 TABLET BY MOUTH EVERY DAY 90 tablet 1    diclofenac sodium (VOLTAREN) 1 % APPLY 2 GRAMS TO AFFECTED AREA 4 TIMES A DAY (Patient not taking: Reported on 3/15/2021) 100 g 1    diphenhydrAMINE (BENADRYL) 25 mg tablet Take 1 tablet (25 mg total) by mouth every 6 (six) hours as needed for itching for up to 5 days 20 tablet 0    EPINEPHrine (EPIPEN) 0 3 mg/0 3 mL SOAJ Inject 0 3 mL (0 3 mg total) into a muscle once for 1 dose 0 6 mL 0    famotidine (PEPCID) 20 mg tablet Take 1 tablet (20 mg total) by mouth daily for 5 days 5 tablet 0    Melatonin 10 MG TABS Take 10 mg by mouth daily at bedtime       No current facility-administered medications for this visit        Current Outpatient Medications on File Prior to Visit   Medication Sig    amLODIPine (NORVASC) 10 mg tablet Take 1 tablet (10 mg total) by mouth daily    aspirin (ECOTRIN LOW STRENGTH) 81 mg EC tablet Take 81 mg by mouth daily    ASPIRIN-ACETAMINOPHEN-CAFFEINE PO Take by mouth    busPIRone (BUSPAR) 30 MG tablet Take 1 tablet (30 mg total) by mouth 3 (three) times a day    carvedilol (COREG) 25 mg tablet Take 1 tablet (25 mg total) by mouth 2 (two) times a day with meals    gabapentin (NEURONTIN) 600 MG tablet Take 1 tablet (600 mg total) by mouth 3 (three) times a day    lisinopril (ZESTRIL) 2 5 mg tablet Take 1 tablet (2 5 mg total) by mouth daily    methocarbamol (ROBAXIN) 500 mg tablet Take 1 tablet (500 mg total) by mouth 2 (two) times a day as needed for muscle spasms    NON FORMULARY Hempvana roll on cream for pain    sertraline (ZOLOFT) 25 mg tablet TAKE 1 TABLET BY MOUTH EVERY DAY    diclofenac sodium (VOLTAREN) 1 % APPLY 2 GRAMS TO AFFECTED AREA 4 TIMES A DAY (Patient not taking: Reported on 3/15/2021)    diphenhydrAMINE (BENADRYL) 25 mg tablet Take 1 tablet (25 mg total) by mouth every 6 (six) hours as needed for itching for up to 5 days    EPINEPHrine (EPIPEN) 0 3 mg/0 3 mL SOAJ Inject 0 3 mL (0 3 mg total) into a muscle once for 1 dose    famotidine (PEPCID) 20 mg tablet Take 1 tablet (20 mg total) by mouth daily for 5 days    Melatonin 10 MG TABS Take 10 mg by mouth daily at bedtime     No current facility-administered medications on file prior to visit  She is allergic to methyldopa       Review of Systems   Constitutional: Negative  HENT: Negative  Eyes: Negative  Respiratory: Positive for cough and shortness of breath (during stairs and due to MS)  Hx: MS which causes SOB   Cardiovascular: Negative  Gastrointestinal: Positive for diarrhea  More than usual   Endocrine: Negative  Genitourinary: Positive for frequency and urgency          1 year     CKD stage III   Musculoskeletal: Positive for arthralgias (Upper and lower back pain  ), back pain (pain radiates up spine and down Lower back and left leg), gait problem (uses can to ambulate), myalgias and neck pain (Pain in Left arm occasionally)  Gabapentin    Skin: Negative  Allergic/Immunologic: Negative  Neurological: Positive for numbness (Left leg )  Negative for dizziness, weakness and headaches  Hematological: Bruises/bleeds easily (ASA 81mg)  Psychiatric/Behavioral: Negative for sleep disturbance  Objective: There were no vitals taken for this visit  Physical Exam       I have personally obtain history and examined patient  I have personally reviewed case including all pertinent investigations/studies        Time spent 30 minutes  More than 50% of total time spent on counseling and coordination of care as described above including patient education, discussion of risks and rationale of conservative vs surgical treatment options          HPI     Chronic low back pain with Hx of T12 VB lesion, 2013 MRI  Denies weakness, numbness, loss of bowel and bladder deficit  Referred in 2019 to Dr Harrison Dyer with Mri w/wo ordered, did not complete   Progressive new back pain x 6 months      Exam     Full strength bilateral LE  Negative SLR  Intact sensation  Intact DTR  Mild percussion tenderness TL spine junction  Paravertebral spasm  Able to toe and heel walk  anatalgic gait  Able to perform tandem                                   Back:     Symmetric, no curvature, ROM normal, no CVA tenderness   Lungs:     Clear to auscultation bilaterally, respirations unlabored   Chest wall:    No tenderness or deformity                           Extremities:   Extremities normal, atraumatic, no cyanosis or edema   Pulses:   2+ and symmetric all extremities   Skin:   Skin color, texture, turgor normal, no rashes or lesions      Radiology     MRI non-contrast     Minimal progression of T12 lesion over 2 yrs, no lytic or destructive characteristics  Mild T2 / stirr signal change indicative of modest fracture  Favour more benign process ie  hemagioma  L4/5 grade 1 spondylolisthesis with marked bilateral facet arthropathy and T2 signal change     Summary and Plan     Ms Ross has acute TL spine pain that may be more mechanical in nature and related to the intrinsic T12 lesion in the setting of chronic LBP that is likely related to severely degenerative facet disease  Today we reviewed the conservative options including PT, HILTON and medications  I explained her that surgery would offer her no guarantee of improvement with elevated risk of complications  Once again, both she and her daughter expressed their desire to avoid surgery  I have referred her to Dr Kiser Signs or further appraisal for possible IR biopsy/kyphoplasty  I will see her in fu in 6 months

## 2021-04-02 ENCOUNTER — TELEPHONE (OUTPATIENT)
Dept: FAMILY MEDICINE CLINIC | Facility: OTHER | Age: 66
End: 2021-04-02

## 2021-04-02 DIAGNOSIS — F31.9 BIPOLAR DEPRESSION (HCC): Primary | ICD-10-CM

## 2021-04-02 NOTE — TELEPHONE ENCOUNTER
Patient called and said she  is taking Sertraline 25 mg one dose in morning and another dose at nite to help her sleep and she would like to know if she can get it increased? Or should she just take one in AM and one in PM as she is doing   She is just crying all the time  Please advise   Thank you

## 2021-04-05 ENCOUNTER — TELEPHONE (OUTPATIENT)
Dept: FAMILY MEDICINE CLINIC | Facility: OTHER | Age: 66
End: 2021-04-05

## 2021-04-05 NOTE — TELEPHONE ENCOUNTER
Initial attempt  Unable to leave message      Please let patient know her appt with Katalina Colon (mental health provider) has been scheduled on 4/27 Tuesday @ 3pm

## 2021-04-07 ENCOUNTER — TELEPHONE (OUTPATIENT)
Dept: FAMILY MEDICINE CLINIC | Facility: OTHER | Age: 66
End: 2021-04-07

## 2021-04-07 NOTE — TELEPHONE ENCOUNTER
----- Message from Miller Rea MD sent at 4/7/2021  1:05 PM EDT -----  Please inform patient and cologuard colon cancer screening result is Negative and normal

## 2021-04-15 DIAGNOSIS — F30.9 BIPOLAR I DISORDER, SINGLE MANIC EPISODE (HCC): ICD-10-CM

## 2021-04-15 RX ORDER — BUSPIRONE HYDROCHLORIDE 30 MG/1
30 TABLET ORAL 3 TIMES DAILY
Qty: 90 TABLET | Refills: 1 | OUTPATIENT
Start: 2021-04-15

## 2021-04-26 DIAGNOSIS — F30.9 BIPOLAR I DISORDER, SINGLE MANIC EPISODE (HCC): ICD-10-CM

## 2021-04-26 RX ORDER — BUSPIRONE HYDROCHLORIDE 30 MG/1
30 TABLET ORAL 3 TIMES DAILY
Qty: 90 TABLET | Refills: 1 | Status: SHIPPED | OUTPATIENT
Start: 2021-04-26 | End: 2021-06-23 | Stop reason: SDUPTHER

## 2021-05-30 DIAGNOSIS — I10 BENIGN ESSENTIAL HYPERTENSION: ICD-10-CM

## 2021-06-02 RX ORDER — LISINOPRIL 2.5 MG/1
TABLET ORAL
Qty: 90 TABLET | Refills: 0 | OUTPATIENT
Start: 2021-06-02

## 2021-06-08 ENCOUNTER — RA CDI HCC (OUTPATIENT)
Dept: OTHER | Facility: HOSPITAL | Age: 66
End: 2021-06-08

## 2021-06-08 NOTE — PROGRESS NOTES
[place] [address]  coding opportunities             Chart reviewed, (number of) suggestions sent to provider: 2     Problem listed updated   Provider Accepted, (number of) suggestions accepted: 2         Number of suggestions actually used: 0      Number of suggestions NOT actually used: 2     Patients insurance company: [employer] (Medicare Advantage and [employer])   dx not used: F31 9, E66 01  Visit status: Patient arrived for their scheduled appointment        [place] [address]  coding opportunities             Chart reviewed, (number of) suggestions sent to provider: 2      DX:  F31 9-Bipolar disorder, unspecified-on zoloft and buspar  E66 01-Bipolar disorder, unspecified--BMI-37 with htn, hyperlipidemia       Patients insurance company: [employer] (Medicare Advantage and [employer])

## 2021-06-09 DIAGNOSIS — I10 BENIGN ESSENTIAL HYPERTENSION: ICD-10-CM

## 2021-06-09 RX ORDER — LISINOPRIL 2.5 MG/1
2.5 TABLET ORAL DAILY
Qty: 90 TABLET | Refills: 0 | Status: SHIPPED | OUTPATIENT
Start: 2021-06-09 | End: 2021-06-15

## 2021-06-10 PROBLEM — E66.01 MORBID (SEVERE) OBESITY DUE TO EXCESS CALORIES (HCC): Status: ACTIVE | Noted: 2021-06-10

## 2021-06-10 PROBLEM — F31.9 BIPOLAR DISORDER, UNSPECIFIED (HCC): Status: ACTIVE | Noted: 2021-06-10

## 2021-06-14 DIAGNOSIS — F30.9 BIPOLAR I DISORDER, SINGLE MANIC EPISODE (HCC): ICD-10-CM

## 2021-06-15 ENCOUNTER — OFFICE VISIT (OUTPATIENT)
Dept: FAMILY MEDICINE CLINIC | Facility: OTHER | Age: 66
End: 2021-06-15
Payer: COMMERCIAL

## 2021-06-15 VITALS
RESPIRATION RATE: 18 BRPM | WEIGHT: 198.4 LBS | BODY MASS INDEX: 37.46 KG/M2 | SYSTOLIC BLOOD PRESSURE: 138 MMHG | TEMPERATURE: 98 F | OXYGEN SATURATION: 94 % | HEIGHT: 61 IN | DIASTOLIC BLOOD PRESSURE: 78 MMHG | HEART RATE: 80 BPM

## 2021-06-15 DIAGNOSIS — I10 BENIGN ESSENTIAL HYPERTENSION: Primary | ICD-10-CM

## 2021-06-15 DIAGNOSIS — R06.00 DYSPNEA, UNSPECIFIED TYPE: ICD-10-CM

## 2021-06-15 DIAGNOSIS — M54.50 CHRONIC LOW BACK PAIN, UNSPECIFIED BACK PAIN LATERALITY, UNSPECIFIED WHETHER SCIATICA PRESENT: ICD-10-CM

## 2021-06-15 DIAGNOSIS — F17.218 CIGARETTE NICOTINE DEPENDENCE WITH OTHER NICOTINE-INDUCED DISORDER: ICD-10-CM

## 2021-06-15 DIAGNOSIS — G89.29 CHRONIC LOW BACK PAIN, UNSPECIFIED BACK PAIN LATERALITY, UNSPECIFIED WHETHER SCIATICA PRESENT: ICD-10-CM

## 2021-06-15 DIAGNOSIS — M51.36 DISC DEGENERATION, LUMBAR: ICD-10-CM

## 2021-06-15 PROBLEM — F17.200 NICOTINE DEPENDENCE: Status: ACTIVE | Noted: 2021-06-15

## 2021-06-15 PROCEDURE — 99215 OFFICE O/P EST HI 40 MIN: CPT | Performed by: FAMILY MEDICINE

## 2021-06-15 PROCEDURE — 1160F RVW MEDS BY RX/DR IN RCRD: CPT | Performed by: FAMILY MEDICINE

## 2021-06-15 RX ORDER — LISINOPRIL 5 MG/1
5 TABLET ORAL DAILY
Qty: 90 TABLET | Refills: 1 | Status: SHIPPED | OUTPATIENT
Start: 2021-06-15 | End: 2022-01-21 | Stop reason: SDUPTHER

## 2021-06-15 RX ORDER — BUSPIRONE HYDROCHLORIDE 30 MG/1
30 TABLET ORAL 3 TIMES DAILY
Qty: 90 TABLET | Refills: 1 | OUTPATIENT
Start: 2021-06-15

## 2021-06-15 RX ORDER — ALBUTEROL SULFATE 90 UG/1
2 AEROSOL, METERED RESPIRATORY (INHALATION) EVERY 4 HOURS PRN
Qty: 18 G | Refills: 1 | Status: SHIPPED | OUTPATIENT
Start: 2021-06-15 | End: 2021-08-02 | Stop reason: SDUPTHER
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
## 2021-06-16 ENCOUNTER — TELEPHONE (OUTPATIENT)
Dept: FAMILY MEDICINE CLINIC | Facility: OTHER | Age: 66
End: 2021-06-16

## 2021-06-16 NOTE — TELEPHONE ENCOUNTER
Spoke with pt, stated will call back with information when she wakes up     Please update chart , dates and lot number of covid  vaccines under imumzation history when pt calls back     Thank you     Tomasz Flannery MA

## 2021-06-16 NOTE — TELEPHONE ENCOUNTER
----- Message from Hill Carver MD sent at 6/15/2021 12:01 PM EDT -----  Last covid vaccine [employer] in April  Please update the chart  Patient has the card

## 2021-06-18 ENCOUNTER — OFFICE VISIT (OUTPATIENT)
Dept: NEUROSURGERY | Facility: CLINIC | Age: 66
End: 2021-06-18
Payer: COMMERCIAL

## 2021-06-18 VITALS
DIASTOLIC BLOOD PRESSURE: 86 MMHG | WEIGHT: 198 LBS | BODY MASS INDEX: 37.38 KG/M2 | RESPIRATION RATE: 16 BRPM | HEIGHT: 61 IN | TEMPERATURE: 98.1 F | HEART RATE: 68 BPM | SYSTOLIC BLOOD PRESSURE: 138 MMHG

## 2021-06-18 DIAGNOSIS — M89.9 BONE LESION: ICD-10-CM

## 2021-06-18 DIAGNOSIS — M54.40 LOW BACK PAIN WITH SCIATICA, SCIATICA LATERALITY UNSPECIFIED, UNSPECIFIED BACK PAIN LATERALITY, UNSPECIFIED CHRONICITY: ICD-10-CM

## 2021-06-18 DIAGNOSIS — M47.26 OSTEOARTHRITIS OF SPINE WITH RADICULOPATHY, LUMBAR REGION: ICD-10-CM

## 2021-06-18 PROCEDURE — 99214 OFFICE O/P EST MOD 30 MIN: CPT | Performed by: PHYSICIAN ASSISTANT

## 2021-06-18 PROCEDURE — 3008F BODY MASS INDEX DOCD: CPT | Performed by: FAMILY MEDICINE

## 2021-06-18 NOTE — ASSESSMENT & PLAN NOTE
T12 vertebral body lesion, increasing in size since 2013 on MRI  · Also with associated pathologic fracture  · Patient c/o occasional mid back pain without radicular symptoms  · No oncologic history    Imaging:  · MRI lumbar spine w/o, 11/17/2020: Infiltrative T12 marrow process continues to slowly increase in size since initial MR 9/3/2013  New left  T12 pathologic fracture and slight loss of vertebral body height  No extraosseous pathology  Query atypical hemangioma   Reactive marrow edema in the superior left lateral endplate at L1    Plan:  · Will obtain MRI lumbar spine w/wo contrast for further updated evaluation of T12 lesion  · Will also order CT lumbar spine for evaluation of bony anatomy and pathologic fracture  · Follow up with Dr Laurel Sofia after imaging is completed for consideration if IR biopsy if clinically indicated  · Likely no indication for kyphoplasty given extent of pathologic fracture

## 2021-06-18 NOTE — ASSESSMENT & PLAN NOTE
Increasing thoracolumbar back pain with radiation into left hip and anterior leg to foot  · Patient c/o weakness, numbness, pain, and tingling down LLE  · Decreased ability to ambulate for perform ADLs, using walker but getting a wheelchair   · Tried PT/injections with minimal improvement  · Exam: decreased sensation entire LLE, 4-/5 weakness in all LLE muscle groups    Imaging:  · MRI lumbar spine w/o, 11/14/2020: L5 and S1 vertebral bodies partially fused, marginal osteophytes, disc bulge without critical stenosis  Mild facet arthrosis      Plan:  · Updated MRI and CT ordered  · Recommend continued follow up with Dr Lynne Wilder; given worsening symptoms recommend surgical discussion

## 2021-06-18 NOTE — PROGRESS NOTES
Neurosurgery Office Note  Saray Blackman 77 y o  female MRN: [id number]      Assessment/Plan     Bone lesion  T12 vertebral body lesion, increasing in size since 2013 on MRI  · Also with associated pathologic fracture  · Patient c/o occasional mid back pain without radicular symptoms  · No oncologic history    Imaging:  · MRI lumbar spine w/o, 11/17/2020: Infiltrative T12 marrow process continues to slowly increase in size since initial MR 9/3/2013  New left  T12 pathologic fracture and slight loss of vertebral body height  No extraosseous pathology  Query atypical hemangioma  Reactive marrow edema in the superior left lateral endplate at L1    Plan:  · Will obtain MRI lumbar spine w/wo contrast for further updated evaluation of T12 lesion  · Will also order CT lumbar spine for evaluation of bony anatomy and pathologic fracture  · Follow up with Dr Navdeep Avila after imaging is completed for consideration if IR biopsy if clinically indicated  · Likely no indication for kyphoplasty given extent of pathologic fracture       Osteoarthritis of spine with radiculopathy, lumbar region  Increasing thoracolumbar back pain with radiation into left hip and anterior leg to foot  · Patient c/o weakness, numbness, pain, and tingling down LLE  · Decreased ability to ambulate for perform ADLs, using walker but getting a wheelchair   · Tried PT/injections with minimal improvement  · Exam: decreased sensation entire LLE, 4-/5 weakness in all LLE muscle groups    Imaging:  · MRI lumbar spine w/o, 11/14/2020: L5 and S1 vertebral bodies partially fused, marginal osteophytes, disc bulge without critical stenosis  Mild facet arthrosis      Plan:  · Updated MRI and CT ordered  · Recommend continued follow up with Dr Terence Dejesus; given worsening symptoms recommend surgical discussion         Diagnoses and all orders for this visit:    Osteoarthritis of spine with radiculopathy, lumbar region  -     Ambulatory referral to Neurosurgery  -     MRI lumbar spine without contrast; Future  -     CT lumbar spine without contrast; Future    Bone lesion  -     Ambulatory referral to Neurosurgery  -     MRI lumbar spine without contrast; Future  -     CT lumbar spine without contrast; Future  -     MRI lumbar spine with and without contrast; Future    Low back pain with sciatica, sciatica laterality unspecified, unspecified back pain laterality, unspecified chronicity  -     Ambulatory referral to Neurosurgery  -     MRI lumbar spine without contrast; Future  -     CT lumbar spine without contrast; Future            CHIEF COMPLAINT    Chief Complaint   Patient presents with    Follow-up       HISTORY      This is a 17-year-old female with a past medical history significant for hypertension, hyperlipidemia, obesity, bipolar 1 disorder, chronic pain who is here today for evaluation of a T12 vertebral body lesion  This has been present on MRI since 2013 but has been growing in size very slowly  It does appear to be benign  There is now evidence of a T12 pathologic fracture as well  The patient denies any injury or falls and denies focal pain at the level of T12  She was referred here today for consideration of IR biopsy or kyphoplasty  She denies any oncologic history      The patient is also complaining of worsening low back pain with radiation into the left lower extremity  She states she has pain, numbness, and tingling radiating down her anterior thigh and lower leg to her foot  She also has symptoms in the bottom of her foot  She has associated weakness in her left lower extremity in her leg does occasionally get out on her  She has a cane for ambulation  She denies any falls  She denies urinary or bowel incontinence but does have urge incontinence  She has tried physical therapy and injections with little relief  She smokes cigarettes and marijuana  She denies ever having surgery on her lumbar spine    She does follow with Dr Karel Hassan for her back pain   Surgery has not been recommended due to her comorbid disease and increased risk for complications  REVIEW OF SYSTEMS    Review of Systems   Constitutional: Negative  HENT: Negative  Eyes: Negative  Respiratory: Positive for cough and shortness of breath (during stairs and due to MS)  Hx: MS which causes SOB   Cardiovascular: Negative  Gastrointestinal: Positive for diarrhea  More than usual   Endocrine: Negative  Genitourinary: Positive for frequency and urgency  1 year     CKD stage III   Musculoskeletal: Positive for arthralgias (Upper and lower back pain  ), back pain (pain radiates up spine and down Lower back and left leg), gait problem (uses can to ambulate), myalgias and neck pain (Pain in Left arm occasionally)  Gabapentin    Skin: Negative  Allergic/Immunologic: Negative  Neurological: Positive for weakness (bi/leg weakness) and numbness (Left leg )  Negative for dizziness and headaches  Hematological: Bruises/bleeds easily (ASA 81mg)  Psychiatric/Behavioral: Negative for sleep disturbance       ROS was personally reviewed and changes made as needed     Meds/Allergies     Current Outpatient Medications   Medication Sig Dispense Refill    albuterol (PROVENTIL HFA,VENTOLIN HFA) 90 mcg/act inhaler Inhale 2 puffs every 4 (four) hours as needed for wheezing or shortness of breath 18 g 1    amLODIPine (NORVASC) 10 mg tablet Take 1 tablet (10 mg total) by mouth daily 90 tablet 1    aspirin (ECOTRIN LOW STRENGTH) 81 mg EC tablet Take 81 mg by mouth daily      ASPIRIN-ACETAMINOPHEN-CAFFEINE PO Take by mouth      busPIRone (BUSPAR) 30 MG tablet Take 1 tablet (30 mg total) by mouth 3 (three) times a day 90 tablet 1    carvedilol (COREG) 25 mg tablet Take 1 tablet (25 mg total) by mouth 2 (two) times a day with meals 180 tablet 1    diclofenac sodium (VOLTAREN) 1 % APPLY 2 GRAMS TO AFFECTED AREA 4 TIMES A  g 1    diphenhydrAMINE (BENADRYL) 25 mg tablet Take 1 tablet (25 mg total) by mouth every 6 (six) hours as needed for itching for up to 5 days 20 tablet 0    famotidine (PEPCID) 20 mg tablet Take 1 tablet (20 mg total) by mouth daily for 5 days 5 tablet 0    gabapentin (NEURONTIN) 600 MG tablet Take 1 tablet (600 mg total) by mouth 3 (three) times a day 270 tablet 1    lisinopril (ZESTRIL) 5 mg tablet Take 1 tablet (5 mg total) by mouth daily 90 tablet 1    Melatonin 10 MG TABS Take 10 mg by mouth daily at bedtime      methocarbamol (ROBAXIN) 500 mg tablet Take 1 tablet (500 mg total) by mouth 2 (two) times a day as needed for muscle spasms 60 tablet 1    NON FORMULARY Hempvana roll on cream for pain      sertraline (ZOLOFT) 50 mg tablet Take 1 tablet (50 mg total) by mouth daily 90 tablet 0    EPINEPHrine (EPIPEN) 0 3 mg/0 3 mL SOAJ Inject 0 3 mL (0 3 mg total) into a muscle once for 1 dose 0 6 mL 0     No current facility-administered medications for this visit         Allergies   Allergen Reactions    Methyldopa Shortness Of Breath and Other (See Comments)     Aldomet       PAST HISTORY    Past Medical History:   Diagnosis Date    Anxiety     Depression     Fatty liver     Hyperlipidemia     Hypertension     Psychiatric disorder     Varicella        Past Surgical History:   Procedure Laterality Date    BREAST SURGERY Bilateral     Reduction Procedure    CARDIAC SURGERY       SECTION      x4    DILATION AND CURETTAGE OF UTERUS      ECTOPIC PREGNANCY SURGERY         Social History     Tobacco Use    Smoking status: Current Every Day Smoker     Packs/day: 1 00    Smokeless tobacco: Never Used    Tobacco comment: 2 Black and milds per day   Vaping Use    Vaping Use: Never used   Substance Use Topics    Alcohol use: Not Currently    Drug use: Yes     Types: Marijuana       Family History   Problem Relation Age of Onset    Lung cancer Mother     Cirrhosis Father     Hypertension Sister     Bipolar disorder Sister    Johnny Andreajohn Heart disease Sister     Diabetes Family     Heart disease Family     Hypertension Family     Stroke Family     Thyroid disease Family          Above history personally reviewed  EXAM    Vitals:Blood pressure 138/86, pulse 68, temperature 98 1 °F (36 7 °C), temperature source Tympanic, resp  rate 16, height 5' 1" (1 549 m), weight 89 8 kg (198 lb)  ,Body mass index is 37 41 kg/m²  Physical Exam  Vitals and nursing note reviewed  Constitutional:       Appearance: Normal appearance  She is well-developed  She is obese  HENT:      Head: Normocephalic and atraumatic  Eyes:      Extraocular Movements: Extraocular movements intact  Pupils: Pupils are equal, round, and reactive to light  Cardiovascular:      Rate and Rhythm: Normal rate  Pulmonary:      Effort: Pulmonary effort is normal  No respiratory distress  Abdominal:      Palpations: Abdomen is soft  Musculoskeletal:         General: Normal range of motion  Cervical back: Normal range of motion  Skin:     General: Skin is warm and dry  Neurological:      Mental Status: She is alert and oriented to person, place, and time  Deep Tendon Reflexes:      Reflex Scores:       Patellar reflexes are 2+ on the right side and 2+ on the left side  Psychiatric:         Mood and Affect: Mood normal          Speech: Speech normal          Behavior: Behavior normal          Thought Content: Thought content normal          Judgment: Judgment normal          Neurologic Exam     Mental Status   Oriented to person, place, and time  Follows 2 step commands  Attention: normal  Concentration: normal    Speech: speech is normal   Level of consciousness: alert  Knowledge: good  Able to repeat  Normal comprehension  Cranial Nerves   Cranial nerves II through XII intact  CN III, IV, VI   Pupils are equal, round, and reactive to light      Motor Exam   Muscle bulk: normal  Overall muscle tone: normal    Strength   Strength 5/5 except as noted  4-/5 left HF, KF, KE     Sensory Exam   Decreased to LT anterior, medial, and lateral LLE to foot  Sensation preserved left foot  TTP bialteral low lumbar spine  No focal T12 tenderness  Decreased lumbar ROM, unable to stand straight or extend     Gait, Coordination, and Reflexes     Gait  Gait: (antalgic with cane)    Tremor   Resting tremor: absent    Reflexes   Right patellar: 2+  Left patellar: 2+        MEDICAL DECISION MAKING    Imaging Studies:     No results found  I have personally reviewed pertinent reports     and I have personally reviewed pertinent films in PACS

## 2021-06-23 DIAGNOSIS — F30.9 BIPOLAR I DISORDER, SINGLE MANIC EPISODE (HCC): ICD-10-CM

## 2021-06-23 RX ORDER — BUSPIRONE HYDROCHLORIDE 30 MG/1
30 TABLET ORAL 3 TIMES DAILY
Qty: 90 TABLET | Refills: 1 | Status: SHIPPED | OUTPATIENT
Start: 2021-06-23 | End: 2021-08-24 | Stop reason: SDUPTHER

## 2021-07-06 ENCOUNTER — TELEPHONE (OUTPATIENT)
Dept: NEUROSURGERY | Facility: CLINIC | Age: 66
End: 2021-07-06

## 2021-07-06 NOTE — TELEPHONE ENCOUNTER
Attempted to complete peer to peer at the request of office personnel  Call to turns company in a advise that no appeared peer was available as claim was already denied and patient does not have commercial insurance  MRI lumbar spine was previously approved and will be completed prior to appointment with and without contrast   CT lumbar spine denied at this time until MRI lumbar spine can be completed and determined to still be necessary at that time

## 2021-07-07 DIAGNOSIS — M54.16 LUMBAR RADICULOPATHY: ICD-10-CM

## 2021-07-07 DIAGNOSIS — M51.36 DISC DEGENERATION, LUMBAR: ICD-10-CM

## 2021-07-07 DIAGNOSIS — F31.9 BIPOLAR DEPRESSION (HCC): ICD-10-CM

## 2021-07-08 RX ORDER — GABAPENTIN 600 MG/1
600 TABLET ORAL 3 TIMES DAILY
Qty: 270 TABLET | Refills: 1 | Status: SHIPPED | OUTPATIENT
Start: 2021-07-08 | End: 2021-09-28 | Stop reason: SDUPTHER

## 2021-07-15 ENCOUNTER — VBI (OUTPATIENT)
Dept: ADMINISTRATIVE | Facility: OTHER | Age: 66
End: 2021-07-15

## 2021-07-21 ENCOUNTER — TELEPHONE (OUTPATIENT)
Dept: NEUROSURGERY | Facility: CLINIC | Age: 66
End: 2021-07-21

## 2021-07-21 NOTE — TELEPHONE ENCOUNTER
Received call from [facility] reporting she has been exposed to [place] and will be quarantining for 14 days  Unable to have MRI completed  Canceled MRI and OV  She stated she will call back to reschedule

## 2021-08-02 DIAGNOSIS — R06.00 DYSPNEA, UNSPECIFIED TYPE: ICD-10-CM

## 2021-08-02 DIAGNOSIS — I10 ESSENTIAL HYPERTENSION: ICD-10-CM

## 2021-08-03 RX ORDER — AMLODIPINE BESYLATE 10 MG/1
10 TABLET ORAL DAILY
Qty: 90 TABLET | Refills: 1 | Status: SHIPPED | OUTPATIENT
Start: 2021-08-03 | End: 2021-10-25 | Stop reason: SDUPTHER

## 2021-08-03 RX ORDER — ALBUTEROL SULFATE 90 UG/1
2 AEROSOL, METERED RESPIRATORY (INHALATION) EVERY 4 HOURS PRN
Qty: 18 G | Refills: 1 | Status: SHIPPED | OUTPATIENT
Start: 2021-08-03 | End: 2021-08-20 | Stop reason: SDUPTHER

## 2021-08-20 ENCOUNTER — OFFICE VISIT (OUTPATIENT)
Dept: FAMILY MEDICINE CLINIC | Facility: OTHER | Age: 66
End: 2021-08-20
Payer: COMMERCIAL

## 2021-08-20 VITALS
HEART RATE: 83 BPM | BODY MASS INDEX: 38.1 KG/M2 | OXYGEN SATURATION: 99 % | SYSTOLIC BLOOD PRESSURE: 112 MMHG | WEIGHT: 201.8 LBS | HEIGHT: 61 IN | DIASTOLIC BLOOD PRESSURE: 72 MMHG | TEMPERATURE: 97.8 F | RESPIRATION RATE: 18 BRPM

## 2021-08-20 DIAGNOSIS — I10 BENIGN ESSENTIAL HYPERTENSION: ICD-10-CM
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
## 2021-08-20 DIAGNOSIS — R06.00 DYSPNEA, UNSPECIFIED TYPE: ICD-10-CM

## 2021-08-20 DIAGNOSIS — M25.561 ACUTE PAIN OF RIGHT KNEE: ICD-10-CM

## 2021-08-20 PROBLEM — N18.31 STAGE 3A CHRONIC KIDNEY DISEASE (HCC): Status: ACTIVE | Noted: 2021-08-20

## 2021-08-20 PROCEDURE — 99213 OFFICE O/P EST LOW 20 MIN: CPT | Performed by: FAMILY MEDICINE

## 2021-08-20 PROCEDURE — 1160F RVW MEDS BY RX/DR IN RCRD: CPT | Performed by: FAMILY MEDICINE

## 2021-08-20 RX ORDER — SENNOSIDES 8.6 MG
650 CAPSULE ORAL EVERY 8 HOURS PRN
Qty: 30 TABLET | Refills: 0 | Status: SHIPPED | OUTPATIENT
Start: 2021-08-20

## 2021-08-20 RX ORDER — CARVEDILOL 25 MG/1
25 TABLET ORAL 2 TIMES DAILY WITH MEALS
Qty: 180 TABLET | Refills: 1 | Status: SHIPPED | OUTPATIENT
Start: 2021-08-20 | End: 2022-02-04 | Stop reason: SDUPTHER

## 2021-08-20 RX ORDER — ALBUTEROL SULFATE 90 UG/1
2 AEROSOL, METERED RESPIRATORY (INHALATION) EVERY 4 HOURS PRN
Qty: 18 G | Refills: 1 | Status: SHIPPED | OUTPATIENT
Start: 2021-08-20

## 2021-08-20 NOTE — PROGRESS NOTES
Assessment/Plan:     Diagnoses and all orders for this visit:    Fall, initial encounter  -   Patient s/p fall on June 19 outdoors, reportedly on her right side impacting her right lower ext  At the time of injury no acute pain or difficulty with ambulation  However, she now presents with worsening right medial joint line pain and swelling x 2 weeks    -    Upon exam significant swelling effusion and tenderness at the right knee and medial joint line- pt not able to tolerate knee exam secondary to pain  - Concern for underlying meniscal tear vs ligamentous tear vs microfracture  Will obtain X-ray and if within normal limits would consider MRI of right knee for further evaluation  - Patient to start using knee brace for further joint stability and pain control  -     XR knee 4+ vw right injury; Future  -     Ambulatory referral to Sports Medicine; Future  -     acetaminophen (TYLENOL) 650 mg CR tablet; Take 1 tablet (650 mg total) by mouth every 8 (eight) hours as needed for moderate pain    Benign essential hypertension  -     carvedilol (COREG) 25 mg tablet; Take 1 tablet (25 mg total) by mouth 2 (two) times a day with meals    Dyspnea, unspecified type  -  Patient does not have complaints/concerns of dyspnea or SOB  -  Patient is only requesting refill of Proventil   -  albuterol (PROVENTIL HFA,VENTOLIN HFA) 90 mcg/act inhaler; Inhale 2 puffs every 4 (four) hours as needed for wheezing or shortness of breath    Acute pain of right knee  -     Plan as noted above in Fall initial encounter diagnosis  -     XR knee 4+ vw right injury; Future  -     Ambulatory referral to Sports Medicine; Future  -     acetaminophen (TYLENOL) 650 mg CR tablet; Take 1 tablet (650 mg total) by mouth every 8 (eight) hours as needed for moderate pain          Subjective:      Patient ID: Niurka Lynn is a 77 y o  female      Patient reports that back on June 19, she was at a [employer] during which she ended up injuring herself  Patient states that at the time of the fall she was getting up from a picnic table and ended up falling sideways on her right side after tripping over her son's dogs  Patient reports at that time she did not experience significant pain, and continued her day without issue ambulating  However as time has progressed she has had worsening pain and now swelling of the right knee x2 weeks   Patient now reports a clicking sensation or her right knee, as well as, and shooting pain that originates at the right medial joint line and radiates to the posterior  knee   Patient reports pain also radiates proximally up the hamstrings    She rates the pain an 8/10 at today's visit,  at its worse she reports the pain has been an 11/10   Over-the-counter Tylenol, direct mineral ice packs, and wrapping have not provided any significant relief of symptoms           The following portions of the patient's history were reviewed and updated as appropriate: allergies, current medications, past family history, past medical history, past social history, past surgical history and problem list     Review of Systems   Eyes: Negative for visual disturbance  Respiratory: Negative for chest tightness  Cardiovascular: Negative for chest pain  Gastrointestinal: Negative for nausea and vomiting  Genitourinary: Negative for difficulty urinating and dysuria  Musculoskeletal: Positive for arthralgias and joint swelling  Skin: Negative for wound  Neurological: Negative for headaches  Objective:      /72   Pulse 83   Temp 97 8 °F (36 6 °C)   Resp 18   Ht 5' 1" (1 549 m)   Wt 91 5 kg (201 lb 12 8 oz)   SpO2 99%   BMI 38 13 kg/m²          Physical Exam  Constitutional:       Appearance: She is well-developed  HENT:      Head: Normocephalic and atraumatic  Nose: Nose normal    Eyes:      Conjunctiva/sclera: Conjunctivae normal    Cardiovascular:      Rate and Rhythm: Normal rate and regular rhythm  Heart sounds: Normal heart sounds  Pulmonary:      Effort: Pulmonary effort is normal       Breath sounds: Normal breath sounds  Abdominal:      General: Bowel sounds are normal       Palpations: Abdomen is soft  Musculoskeletal:      Cervical back: Normal range of motion and neck supple  Right knee: Swelling, effusion and crepitus present  No erythema or ecchymosis  Decreased range of motion  Tenderness present over the medial joint line  Left knee: Normal       Instability Tests: Anterior drawer test negative  Posterior drawer test negative  Anterior Lachman test negative  Comments: Unable to perform right knee exam secondary to pain   Skin:     General: Skin is warm and dry  Neurological:      Mental Status: She is alert and oriented to person, place, and time

## 2021-08-21 ENCOUNTER — HOSPITAL ENCOUNTER (OUTPATIENT)
Dept: RADIOLOGY | Facility: HOSPITAL | Age: 66
Discharge: HOME | End: 2021-08-21
Payer: COMMERCIAL

## 2021-08-21 ENCOUNTER — HOSPITAL ENCOUNTER (OUTPATIENT)
Dept: RADIOLOGY | Facility: HOSPITAL | Age: 66
Discharge: HOME | End: 2021-08-21
Attending: FAMILY MEDICINE
Payer: COMMERCIAL

## 2021-08-21 DIAGNOSIS — F17.218 CIGARETTE NICOTINE DEPENDENCE WITH OTHER NICOTINE-INDUCED DISORDER: ICD-10-CM

## 2021-08-21 DIAGNOSIS — M25.561 ACUTE PAIN OF RIGHT KNEE: ICD-10-CM

## 2021-08-21 DIAGNOSIS — R06.00 DYSPNEA, UNSPECIFIED TYPE: ICD-10-CM
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
## 2021-08-21 PROCEDURE — 73564 X-RAY EXAM KNEE 4 OR MORE: CPT

## 2021-08-21 PROCEDURE — 71046 X-RAY EXAM CHEST 2 VIEWS: CPT

## 2021-08-23 ENCOUNTER — RA CDI HCC (OUTPATIENT)
Dept: OTHER | Facility: HOSPITAL | Age: 66
End: 2021-08-23

## 2021-08-23 NOTE — PROGRESS NOTES
[employer]  coding opportunities          Number of diagnosis code(s) already on the problem list added to FYI fla     Chart Reviewed * (Number of) Inbasket suggestions sent to Provider: 1            Number of suggestions used: 0      Number of suggestions NOT actually used: 3     Patients insurance company: [employer] (Medicare Advantage and Commercial)     Visit status: Patient arrived for their scheduled appointment     Provider never responded to [employer]  coding request     [employer]  coding opportunities          Number of diagnosis code(s) already on the problem list added to FYI fla     Chart Reviewed * (Number of) Inbasket suggestions sent to Provider: 1                  Patients insurance company: [employer] ([employer])           NOT found on active problem list -   1)   I12 9 Hypertensive chronic kidney disease with stage 1 through stage 4 chronic  kidney disease, or unspecified chronic kidney disease                  ----Per ICD 10 CM coding guidelines 2020-21: The classification presumes a causal relationship    between hypertension  and kidney involvement, as the two conditions are linked by the term "with" in the Alphabetic Index  These conditions should be coded as related even in the absence of provider documentation explicitly linking them, unless the documentation clearly states the conditions are unrelated      FOUND on active problem list - please assess using MEAT for  billing  2) E66 01  Obesity ([employer] [facility] )  3) F30 9 Bipolar I disorder, single manic episode ([employer] )

## 2021-08-24 ENCOUNTER — TELEPHONE (OUTPATIENT)
Dept: FAMILY MEDICINE CLINIC | Facility: OTHER | Age: 66
End: 2021-08-24

## 2021-08-24 DIAGNOSIS — F30.9 BIPOLAR I DISORDER, SINGLE MANIC EPISODE (HCC): ICD-10-CM

## 2021-08-24 RX ORDER — BUSPIRONE HYDROCHLORIDE 30 MG/1
30 TABLET ORAL 3 TIMES DAILY
Qty: 90 TABLET | Refills: 1 | Status: SHIPPED | OUTPATIENT
Start: 2021-08-24 | End: 2021-10-22 | Stop reason: SDUPTHER

## 2021-08-24 NOTE — TELEPHONE ENCOUNTER
Please advise patient that she has to take the 1000 MG tylenol prn every 6-8 hours or we can do steroid taper dose  If tylenol is not helping I can offer her the steroid taper dose  Thanks,  Dr Jayson Hinojosa

## 2021-08-24 NOTE — TELEPHONE ENCOUNTER
----- Message from Alessio Jacobo MD sent at 8/24/2021  2:34 PM EDT -----  The CXR shows no infection or other acute disease of lungs other than congestion of the blood vessels  For now I would like to get an echocardiogram to check her heart function and provide her with cardiology referral today  Please have her schedule the test and call to make the appointment  Thanks,  Dr Eyal Layne

## 2021-08-24 NOTE — TELEPHONE ENCOUNTER
Patient called and said she needs something stronger then acetaminophen (she said that's not helping her at all, and its like taking OTC meds)     Please advise   Thank you

## 2021-08-25 NOTE — TELEPHONE ENCOUNTER
Pt notified  Patient states she is taking tylenol 650 mg 2 tabs/daily PRN  States she does not want to take any steroids  Patient states she is upset because she feels her pain has not been addressed  Patient has appt scheduled on Monday with Dianne Ramirez

## 2021-08-30 ENCOUNTER — OFFICE VISIT (OUTPATIENT)
Dept: FAMILY MEDICINE CLINIC | Facility: OTHER | Age: 66
End: 2021-08-30
Payer: COMMERCIAL

## 2021-08-30 VITALS
BODY MASS INDEX: 37.76 KG/M2 | DIASTOLIC BLOOD PRESSURE: 88 MMHG | HEART RATE: 98 BPM | SYSTOLIC BLOOD PRESSURE: 140 MMHG | TEMPERATURE: 98.8 F | RESPIRATION RATE: 20 BRPM | OXYGEN SATURATION: 94 % | HEIGHT: 61 IN | WEIGHT: 200 LBS

## 2021-08-30 DIAGNOSIS — M25.561 ACUTE PAIN OF RIGHT KNEE: Primary | ICD-10-CM
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
## 2021-08-30 PROCEDURE — 99214 OFFICE O/P EST MOD 30 MIN: CPT | Performed by: FAMILY MEDICINE

## 2021-08-30 PROCEDURE — 3008F BODY MASS INDEX DOCD: CPT | Performed by: FAMILY MEDICINE

## 2021-08-30 PROCEDURE — 1100F PTFALLS ASSESS-DOCD GE2>/YR: CPT | Performed by: FAMILY MEDICINE

## 2021-08-30 PROCEDURE — 3288F FALL RISK ASSESSMENT DOCD: CPT | Performed by: FAMILY MEDICINE

## 2021-08-30 RX ORDER — OXYCODONE HYDROCHLORIDE 5 MG/1
5 TABLET ORAL EVERY 6 HOURS PRN
Qty: 20 TABLET | Refills: 0 | Status: SHIPPED | OUTPATIENT
Start: 2021-08-30 | End: 2021-09-14 | Stop reason: SDUPTHER

## 2021-08-30 NOTE — ASSESSMENT & PLAN NOTE
Juancho Pedroza is a 77 y o female with a past medical history of lumbar radiculopathy and lumbar osteoarthritis presenting today for a follow up to her 8/20 appointment regarding her right knee pain s/p injury from falling  PLAN:  -Begin oxycodone 5mg  -Consider PT/OT if pain becomes tolerable

## 2021-08-30 NOTE — PROGRESS NOTES
Assessment/Plan:    Acute pain of right knee  Josue Wagner is a 77 y o female with a past medical history of lumbar radiculopathy, chronic pain syndrome and Stage 3 CKD presenting today for a follow up to her 8/20 appointment regarding her right knee pain s/p injury from falling  Patient was sent for Xray imaging at that time, started on 650mg of tylenol q8 scheduled and encouraged to use a knee brace for joint stability  Today she reports continued pain rating it a 10/10, increased tylenol to 975mg q8 and oxycodone ordered for acute right knee as this is interfering with ADLs  PLAN:  -Begin oxycodone 5mg  -Consider PT/OT once pain improves  - Patient has appointment scheduled with Sports Medicine on 09/09/2021,        Diagnoses and all orders for this visit:    Acute pain of right knee  -     oxyCODONE (ROXICODONE) 5 mg immediate release tablet; Take 1 tablet (5 mg total) by mouth every 6 (six) hours as needed for moderate pain or severe painMax Daily Amount: 20 mg    Fall, subsequent encounter  -     oxyCODONE (ROXICODONE) 5 mg immediate release tablet; Take 1 tablet (5 mg total) by mouth every 6 (six) hours as needed for moderate pain or severe painMax Daily Amount: 20 mg    Other orders  -     Calcium Carb-Cholecalciferol (OSCAL-D) 500 mg-200 units per tablet; Take 1 tablet by mouth 2 (two) times a day with meals          Subjective:      Patient ID: Josue Wagner is a 77 y o  female  Knee Pain   The incident occurred more than 1 week ago  The incident occurred in the yard  The injury mechanism was a fall  The pain is present in the right knee  The quality of the pain is described as stabbing and shooting (traveling up the thigh and to the dorsalmedial portion of the knee  )  The pain is at a severity of 10/10  The pain is severe  The pain has been constant since onset  Associated symptoms include a loss of motion  Pertinent negatives include no numbness  She reports no foreign bodies present   She has tried NSAIDs, ice, heat, immobilization and rest (tylenol made her sick  Used mineral ice, icy hot  Neither worked ) for the symptoms  The treatment provided no relief  The following portions of the patient's history were reviewed and updated as appropriate: allergies, current medications, past family history, past medical history, past social history, past surgical history and problem list     Review of Systems   Constitutional: Positive for activity change (Due to knee pain)  Negative for appetite change, chills, diaphoresis, fatigue and fever  Respiratory: Negative for chest tightness  Cardiovascular: Negative for chest pain and palpitations  Gastrointestinal: Negative for nausea and vomiting  Endocrine: Negative for cold intolerance and heat intolerance  Musculoskeletal: Positive for back pain, gait problem and joint swelling (Knee)  Skin: Negative for color change, pallor, rash and wound  Neurological: Negative for numbness  Objective:      /88   Pulse 98   Temp 98 8 °F (37 1 °C)   Resp 20   Ht 5' 1" (1 549 m)   Wt 90 7 kg (200 lb)   SpO2 94%   BMI 37 79 kg/m²          Physical Exam  Constitutional:       General: She is not in acute distress  Appearance: Normal appearance  She is obese  She is not ill-appearing, toxic-appearing or diaphoretic  HENT:      Head: Normocephalic  Eyes:      General: No scleral icterus  Right eye: No discharge  Left eye: No discharge  Cardiovascular:      Rate and Rhythm: Normal rate and regular rhythm  Pulses: Normal pulses  Heart sounds: Normal heart sounds  No murmur heard  No gallop  Pulmonary:      Effort: Pulmonary effort is normal  No respiratory distress  Breath sounds: Normal breath sounds  No stridor  No wheezing, rhonchi or rales  Abdominal:      General: Bowel sounds are normal    Musculoskeletal:      Right knee: Swelling present  No deformity or bony tenderness   Decreased range of motion  Tenderness present over the MCL  Left knee: No swelling, deformity or bony tenderness  Normal range of motion  No tenderness  Right lower leg: No edema  Left lower leg: No edema  Legs:    Neurological:      Mental Status: She is alert

## 2021-08-30 NOTE — PATIENT INSTRUCTIONS
Knee Pain   WHAT YOU NEED TO KNOW:   Knee pain may start suddenly, or it may be a long-term problem  You may have pain on the side, front, or back of your knee  You may have knee stiffness and swelling  You may hear popping sounds or feel like your knee is giving way or locking up as you walk  You may feel pain when you sit, stand, walk, or climb up and down stairs  Knee pain can be caused by conditions such as obesity, inflammation, or strains or tears in ligaments or tendons  DISCHARGE INSTRUCTIONS:   Return to the emergency department if:   · Your pain is worse, even after treatment  · You cannot bend or straighten your leg completely  · The swelling around your knee does not go down even with treatment  · Your knee is painful and hot to the touch  Contact your healthcare provider if:   · You have questions or concerns about your condition or care  Medicines: You may need any of the following:  · NSAIDs  help decrease swelling and pain or fever  This medicine is available with or without a doctor's order  NSAIDs can cause stomach bleeding or kidney problems in certain people  If you take blood thinner medicine, always ask your healthcare provider if NSAIDs are safe for you  Always read the medicine label and follow directions  · Acetaminophen  decreases pain and fever  It is available without a doctor's order  Ask how much to take and how often to take it  Follow directions  Read the labels of all other medicines you are using to see if they also contain acetaminophen, or ask your doctor or pharmacist  Acetaminophen can cause liver damage if not taken correctly  Do not use more than 4 grams (4,000 milligrams) total of acetaminophen in one day  · Prescription pain medicine  may be given  Ask your healthcare provider how to take this medicine safely  Some prescription pain medicines contain acetaminophen   Do not take other medicines that contain acetaminophen without talking to your healthcare provider  Too much acetaminophen may cause liver damage  Prescription pain medicine may cause constipation  Ask your healthcare provider how to prevent or treat constipation  · Take your medicine as directed  Contact your healthcare provider if you think your medicine is not helping or if you have side effects  Tell him or her if you are allergic to any medicine  Keep a list of the medicines, vitamins, and herbs you take  Include the amounts, and when and why you take them  Bring the list or the pill bottles to follow-up visits  Carry your medicine list with you in case of an emergency  What you can do to manage your symptoms:   · Rest your knee so it can heal   Limit activities that increase your pain  Do low-impact exercises, such as walking or swimming  · Apply ice to help reduce swelling and pain  Use an ice pack, or put crushed ice in a plastic bag  Cover it with a towel before you apply it to your knee  Apply ice for 15 to 20 minutes every hour, or as directed  · Apply compression to help reduce swelling  Use a brace or bandage only as directed  · Elevate your knee to help decrease pain and swelling  Elevate your knee while you are sitting or lying down  Prop your leg on pillows to keep your knee above the level of your heart  · Prevent your knee from moving as directed  Your healthcare provider may put on a cast or splint  You may need to wear a leg brace to stabilize your knee  A leg brace can be adjusted to increase your range of motion as your knee heals  What you can do to prevent knee pain:   · Maintain a healthy weight  Extra weight increases your risk for knee pain  Ask your healthcare provider how much you should weigh  He or she can help you create a safe weight loss plan if you need to lose weight  · Exercise or train properly  Use the correct equipment for sports  Wear shoes that provide good support   Check your posture often as you exercise, play sports, or train for an event  This can help prevent stress and strain on your knees  Rest between sessions so you do not overwork your knees  Follow up with your healthcare provider within 24 hours or as directed: You may need follow-up treatments, such as steroid injections to decrease pain  Write down your questions so you remember to ask them during your visits  © Copyright [employer] 2021 Information is for End User's use only and may not be sold, redistributed or otherwise used for commercial purposes  All illustrations and images included in CareNotes® are the copyrighted property of [employer]  or [facility] Leticia Dumas   The above information is an  only  It is not intended as medical advice for individual conditions or treatments  Talk to your doctor, nurse or pharmacist before following any medical regimen to see if it is safe and effective for you

## 2021-08-31 ENCOUNTER — HOSPITAL ENCOUNTER (OUTPATIENT)
Dept: NON INVASIVE DIAGNOSTICS | Facility: CLINIC | Age: 66
Discharge: HOME | End: 2021-08-31
Payer: COMMERCIAL

## 2021-08-31 DIAGNOSIS — R06.00 DOE (DYSPNEA ON EXERTION): ICD-10-CM

## 2021-08-31 PROCEDURE — 93306 TTE W/DOPPLER COMPLETE: CPT | Performed by: INTERNAL MEDICINE

## 2021-08-31 PROCEDURE — 93306 TTE W/DOPPLER COMPLETE: CPT

## 2021-09-08 NOTE — PROGRESS NOTES
1  Acute pain of right knee  diazepam (VALIUM) 5 mg tablet   2  Morbid (severe) obesity due to excess calories ([address] )     3  Primary osteoarthritis of right knee       No orders of the defined types were placed in this encounter  Imaging Studies (I personally reviewed images in PACS and report):      X-ray of right knee 8/21/21: Mild osteoarthritis of the right knee medial joint compartment and small joint effusion    IMPRESSION:    DOI: 6/19/21    Repeat X-ray next visit: None    Return in about 1 week (around 9/16/2021)  Patient Instructions   Explained the patient that she has medial compartment osteoarthritis with an effusion of her knee  She may have a meniscal tear but this is likely nonobstructive based on her examination and I recommended trial of corticosteroid injection and aspiration  Patient anxious about procedure and will return with  and to  prescription of Valium for short term anti anxiety treatment  No use of oxycodone day of procedure  CHIEF COMPLAINT:  Right knee pain    HPI:  Gerald Casanova is a 77 y o  female  who presents for evaluation of her right knee  She had a fall in her backyard on 06/19/2021  She initially had relief for pain after several days, but states pain restarted spontaneously after 2 months  She was referred by Fernando Aguilar  Visit 9/9/2021 :  She states she is still having pain  In her right knee with some swelling  Pain today is a 6/10, it gets to 9/10 without any pain meds  States pain is constant  Describes the aching, stabbing with stiffness  Pain is worse with climbing stairs, driving squatting and lifting  She has tried over-the-counter meds, including Voltaren gel  She also has coexisting back pain which is longstanding  Currently on OxyContin which is giving her relief  Review of Systems   Constitutional: Negative for chills and fever  HENT: Negative for ear pain and sore throat      Eyes: Negative for pain and visual disturbance  Respiratory: Negative for cough and shortness of breath  Cardiovascular: Negative for chest pain and palpitations  Gastrointestinal: Negative for abdominal pain and vomiting  Genitourinary: Negative for dysuria and hematuria  Musculoskeletal: Negative for arthralgias and back pain  Skin: Negative for color change and rash  Neurological: Negative for seizures and syncope  All other systems reviewed and are negative          Following history reviewed and update:    Past Medical History:   Diagnosis Date    Anxiety     Depression     Fatty liver     Hyperlipidemia     Hypertension     Psychiatric disorder     Varicella      Past Surgical History:   Procedure Laterality Date    BREAST SURGERY Bilateral     Reduction Procedure    CARDIAC SURGERY       SECTION      x4    DILATION AND CURETTAGE OF UTERUS      ECTOPIC PREGNANCY SURGERY       Social History   Social History     Substance and Sexual Activity   Alcohol Use Not Currently     Social History     Substance and Sexual Activity   Drug Use Yes    Types: Marijuana     Social History     Tobacco Use   Smoking Status Current Every Day Smoker    Packs/day: 1 00   Smokeless Tobacco Never Used   Tobacco Comment    2 Black and milds per day     Family History   Problem Relation Age of Onset    Lung cancer Mother     Cirrhosis Father     Hypertension Sister     Bipolar disorder Sister     Heart disease Sister     Diabetes Family     Heart disease Family     Hypertension Family     Stroke Family     Thyroid disease Family      Allergies   Allergen Reactions    Methyldopa Shortness Of Breath and Other (See Comments)     Aldomet          Physical Exam  /84 (BP Location: Right arm, Patient Position: Sitting, Cuff Size: Adult)   Pulse 89   Wt 90 7 kg (200 lb)   BMI 37 79 kg/m²     Constitutional:  see vital signs  Gen: well-developed, normocephalic/atraumatic, well-groomed  Eyes: No inflammation or discharge of conjunctiva or lids; sclera clear   Pharynx: no inflammation, lesion, or mass of lips  Neck: supple, no masses, non-distended  MSK: no inflammation, lesion, mass, or clubbing of nails and digits except for other than mentioned below  SKIN: no visible rashes or skin lesions  Pulmonary/Chest: Effort normal  No respiratory distress     NEURO: cranial nerves grossly intact  PSYCH:  Alert and oriented to person, place, and time; recent and remote memory intact; mood normal, no depression, anxiety, or agitation, judgment and insight good and intact     Ortho Exam    RIGHT KNEE:  Erythema: no  Swelling: positive (+2 effusion)  Increased Warmth: no  Tenderness:  Medial joint line tenderness  Flexion: intact  Extension: intact  Patellar Displacement:  Patellar Tilt:  Patellar Apprehension: negative  Patellar Grind Key's: negative  Lachman's: negative  Drawer: negative  Varus laxity: negative  Valgus laxity: negative  [facility]:  Clicking on lateral side  Thessaly Test:  Dial Kimberlee    Procedures

## 2021-09-09 ENCOUNTER — CONSULT (OUTPATIENT)
Dept: OBGYN CLINIC | Facility: OTHER | Age: 66
End: 2021-09-09
Payer: COMMERCIAL

## 2021-09-09 VITALS
WEIGHT: 200 LBS | DIASTOLIC BLOOD PRESSURE: 84 MMHG | SYSTOLIC BLOOD PRESSURE: 126 MMHG | HEART RATE: 89 BPM | BODY MASS INDEX: 37.79 KG/M2

## 2021-09-09 DIAGNOSIS — M17.11 PRIMARY OSTEOARTHRITIS OF RIGHT KNEE: ICD-10-CM

## 2021-09-09 DIAGNOSIS — E66.01 MORBID (SEVERE) OBESITY DUE TO EXCESS CALORIES (HCC): ICD-10-CM

## 2021-09-09 DIAGNOSIS — M25.561 ACUTE PAIN OF RIGHT KNEE: Primary | ICD-10-CM

## 2021-09-09 PROCEDURE — 99203 OFFICE O/P NEW LOW 30 MIN: CPT | Performed by: FAMILY MEDICINE

## 2021-09-09 RX ORDER — DIAZEPAM 5 MG/1
5 TABLET ORAL
Qty: 2 TABLET | Refills: 0 | Status: SHIPPED | OUTPATIENT
Start: 2021-09-09 | End: 2022-03-28

## 2021-09-09 NOTE — PATIENT INSTRUCTIONS
Explained the patient that she has medial compartment osteoarthritis with an effusion of her knee  She may have a meniscal tear but this is likely nonobstructive based on her examination and I recommended trial of corticosteroid injection and aspiration  Patient anxious about procedure and will return with  and to  prescription of Valium for short term anti anxiety treatment  No use of oxycodone day of procedure

## 2021-09-10 ENCOUNTER — TELEPHONE (OUTPATIENT)
Dept: OBGYN CLINIC | Facility: HOSPITAL | Age: 66
End: 2021-09-10

## 2021-09-10 NOTE — TELEPHONE ENCOUNTER
[employer] is calling to request auth for the Diazepam medication  They can be reached at [phone] option 3   Thank you

## 2021-09-14 DIAGNOSIS — M25.561 ACUTE PAIN OF RIGHT KNEE: ICD-10-CM
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
## 2021-09-15 RX ORDER — OXYCODONE HYDROCHLORIDE 5 MG/1
5 TABLET ORAL EVERY 6 HOURS PRN
Qty: 20 TABLET | Refills: 0 | Status: SHIPPED | OUTPATIENT
Start: 2021-09-15 | End: 2021-09-28 | Stop reason: SDUPTHER

## 2021-09-15 NOTE — PROGRESS NOTES
1  Effusion of right knee  Body fluid culture and Gram stain    Synovial fluid, crystal    RBC count,Synovial Fluid    Synovial fluid white cell count w/ diff    Lyme disease, PCR    Large joint arthrocentesis: R knee   2  Primary osteoarthritis of right knee  Large joint arthrocentesis: R knee   3  Anxiety       Orders Placed This Encounter   Procedures    Large joint arthrocentesis: R knee    Body fluid culture and Gram stain    Synovial fluid, crystal    RBC count,Synovial Fluid    Synovial fluid white cell count w/ diff    Lyme disease, PCR        Imaging Studies (I personally reviewed images in PACS and report):    Xray of right knee 8/21/21: Mild osteoarthritis of the right knee medial joint compartment and small joint effusion  IMPRESSION:    Medial OA  Anxiety  Arthrocentesis of right knee with fluid analysis send out  ICS injection    DOI: 6/19/21 (fall)    Repeat X-ray next visit: None    Return if symptoms worsen or fail to improve  Patient Instructions   Treatment for knee osteoarthritis depends on severity of cartilage wear and pain levels  First line for mild arthritis is exercise to strengthen the knee and allow better joint movement, 5-10% weight loss if overweight, and topical anti-inflammatories such as diclofenac gel or topical analgesic pain relief creams such as capsaicin  Symptoms at this stage usually improve in 3 months  Moderate to severe arthritis or arthritis not responding to first line treatments may require oral medicine such as anti-inflammatories (NSAIDs) such as ibuprofen/motrin, and if failing treatment with oral NSAIDs, then may consider depression medicines such as duloxetine (cymbalta) which also have been shown to work on pain receptors and help to control pain  Other analgesics such as tylenol (acetaminophen) may be used but do not appear to offer significant pain relief  Supplements such as glucosamine and chondroitin supplements   Some studies do show benefit from taking glucosamine sulfate (1500 mg/day) and chondroitin (800 mg/day) but evidence is controversial  I recommend against a type of glucosamine known as "glucosamine hydrochloride" which appears not as effective as glucosamine sulfate  If no improvement with oral medicines, then patients may consider steroid injection into the knee joint which provides pain relief  Steroid injections can be given every 3 months  Any sooner than that can  result in possible deterioration or cartilage in your joint  Other injections are available such as as viscosupplementation or gel shots into the knee which provide nutrients for the cartilage and can result in pain reduction  These viscosupplementation injections are generally considered every 6 months but are controversial   The [address] recommends against viscosupplementation injection; however, the [employer] [facility] recommends for these injections  Beyond these injections there are other controversial treatments including stem cell as well as platelet rich plasma injection which are out of pocket usually up to a 1000 dollars per injection  Lastly, if you fail conservative measures and have persistent pain, then we may consider referral to surgeon for knee replacement  ([facility] 2018)  CHIEF COMPLAINT: follow-up right knee      HPI:  Esmer Koehler is a 77 y o  female  who presents for follow up of her right knee  She was last seen and examined 09/09/2021  She had a fall in 06/19/2021 which she had some acute pain with relief shortly after, she began having a resurgence of the pain about a month after her fall  Last visit she was noted to have  Medial compartment osteoarthritis with effusion of her right knee  There was also concern for nonobstructive meniscal tear and a corticosteroid injection was recommended   Patient was anxious above procedure advised to return with  and given a prescription of Valium to be taken before procedure  Visit 2021 :  States the pain is about a 3/10  She avoided putting weight on it so she feels little better today  Does not have any numbness and tingling going down the leg  Review of Systems   Constitutional: Negative for chills and fever  HENT: Negative for ear pain and sore throat  Eyes: Negative for pain and visual disturbance  Respiratory: Negative for cough and shortness of breath  Cardiovascular: Negative for chest pain and palpitations  Gastrointestinal: Negative for abdominal pain and vomiting  Genitourinary: Negative for dysuria and hematuria  Musculoskeletal: Negative for arthralgias and back pain  Skin: Negative for color change and rash  Neurological: Negative for seizures and syncope  All other systems reviewed and are negative          Following history reviewed and update:    Past Medical History:   Diagnosis Date    Anxiety     Depression     Fatty liver     Hyperlipidemia     Hypertension     Psychiatric disorder     Varicella      Past Surgical History:   Procedure Laterality Date    BREAST SURGERY Bilateral     Reduction Procedure    CARDIAC SURGERY       SECTION      x4    DILATION AND CURETTAGE OF UTERUS      ECTOPIC PREGNANCY SURGERY       Social History   Social History     Substance and Sexual Activity   Alcohol Use Not Currently     Social History     Substance and Sexual Activity   Drug Use Yes    Types: Marijuana     Social History     Tobacco Use   Smoking Status Current Every Day Smoker    Packs/day: 1 00   Smokeless Tobacco Never Used   Tobacco Comment    2 Black and milds per day     Family History   Problem Relation Age of Onset    Lung cancer Mother     Cirrhosis Father     Hypertension Sister     Bipolar disorder Sister     Heart disease Sister     Diabetes Family     Heart disease Family     Hypertension Family     Stroke Family     Thyroid disease Family Allergies   Allergen Reactions    Methyldopa Shortness Of Breath and Other (See Comments)     Aldomet          Physical Exam  /87 (BP Location: Right arm, Patient Position: Sitting, Cuff Size: Adult)   Pulse 94   Wt 90 7 kg (200 lb)   BMI 37 79 kg/m²     Constitutional:  see vital signs  Gen: well-developed, normocephalic/atraumatic, well-groomed  Eyes: No inflammation or discharge of conjunctiva or lids; sclera clear   Pharynx: no inflammation, lesion, or mass of lips  Neck: supple, no masses, non-distended  MSK: no inflammation, lesion, mass, or clubbing of nails and digits except for other than mentioned below  SKIN: no visible rashes or skin lesions  Pulmonary/Chest: Effort normal  No respiratory distress  NEURO: cranial nerves grossly intact  PSYCH:  Alert and oriented to person, place, and time; recent and remote memory intact; mood normal, no depression, anxiety, or agitation, judgment and insight good and intact     Ortho Exam     RIGHT KNEE:  Erythema: no  Swelling: no  Increased Warmth: yes  Tenderness: medial joint line  Flexion: intact  Extension: intact  Patellar Displacement:  Patellar Tilt:  Patellar Apprehension: negative  Patellar Grind Key's: negative  Lachman's: negative  Drawer: negative  Varus laxity: negative  Valgus laxity: negative (reproduces pain on medial joint line)  [facility]: negative   Thessaly Test:  Dial Test:    Large joint arthrocentesis: R knee  Universal Protocol:  Consent: Verbal consent obtained  Risks and benefits: risks, benefits and alternatives were discussed  Consent given by: patient  Patient understanding: patient states understanding of the procedure being performed  Patient consent: the patient's understanding of the procedure matches consent given  Site marked: the operative site was marked  Radiology Images displayed and confirmed   If images not available, report reviewed: imaging studies available  Patient identity confirmed: verbally with patient    Supporting Documentation  Indications: pain and joint swelling   Procedure Details  Location: knee - R knee  Preparation: Patient was prepped and draped in the usual sterile fashion  Needle size: 22 G  Approach: lateral  Medications administered: 5 mL lidocaine 1 %; 4 mL lidocaine 1 %; 40 mg triamcinolone acetonide 40 mg/mL    Aspirate amount: 40 mL  Aspirate: clear  Analysis: fluid sample sent for laboratory analysis    Patient tolerance: patient tolerated the procedure well with no immediate complications

## 2021-09-16 ENCOUNTER — APPOINTMENT (OUTPATIENT)
Dept: LAB | Facility: HOSPITAL | Age: 66
End: 2021-09-16
Attending: FAMILY MEDICINE
Payer: COMMERCIAL

## 2021-09-16 ENCOUNTER — OFFICE VISIT (OUTPATIENT)
Dept: OBGYN CLINIC | Facility: OTHER | Age: 66
End: 2021-09-16
Payer: COMMERCIAL

## 2021-09-16 ENCOUNTER — VBI (OUTPATIENT)
Dept: ADMINISTRATIVE | Facility: OTHER | Age: 66
End: 2021-09-16

## 2021-09-16 VITALS
HEART RATE: 94 BPM | SYSTOLIC BLOOD PRESSURE: 139 MMHG | DIASTOLIC BLOOD PRESSURE: 87 MMHG | WEIGHT: 200 LBS | BODY MASS INDEX: 37.79 KG/M2

## 2021-09-16 DIAGNOSIS — M25.461 EFFUSION OF RIGHT KNEE: Primary | ICD-10-CM

## 2021-09-16 DIAGNOSIS — M25.461 EFFUSION OF RIGHT KNEE: ICD-10-CM

## 2021-09-16 DIAGNOSIS — F41.9 ANXIETY: ICD-10-CM

## 2021-09-16 DIAGNOSIS — M17.11 PRIMARY OSTEOARTHRITIS OF RIGHT KNEE: ICD-10-CM

## 2021-09-16 LAB — CRYSTALS SNV QL MICRO: NORMAL

## 2021-09-16 PROCEDURE — 89051 BODY FLUID CELL COUNT: CPT

## 2021-09-16 PROCEDURE — 3075F SYST BP GE 130 - 139MM HG: CPT | Performed by: FAMILY MEDICINE

## 2021-09-16 PROCEDURE — 87205 SMEAR GRAM STAIN: CPT

## 2021-09-16 PROCEDURE — 87070 CULTURE OTHR SPECIMN AEROBIC: CPT

## 2021-09-16 PROCEDURE — 36415 COLL VENOUS BLD VENIPUNCTURE: CPT

## 2021-09-16 PROCEDURE — 1160F RVW MEDS BY RX/DR IN RCRD: CPT | Performed by: FAMILY MEDICINE

## 2021-09-16 PROCEDURE — 89060 EXAM SYNOVIAL FLUID CRYSTALS: CPT

## 2021-09-16 PROCEDURE — 89050 BODY FLUID CELL COUNT: CPT

## 2021-09-16 PROCEDURE — 20610 DRAIN/INJ JOINT/BURSA W/O US: CPT | Performed by: FAMILY MEDICINE

## 2021-09-16 PROCEDURE — 87476 LYME DIS DNA AMP PROBE: CPT

## 2021-09-16 PROCEDURE — 99213 OFFICE O/P EST LOW 20 MIN: CPT | Performed by: FAMILY MEDICINE

## 2021-09-16 PROCEDURE — 3079F DIAST BP 80-89 MM HG: CPT | Performed by: FAMILY MEDICINE

## 2021-09-16 RX ORDER — TRIAMCINOLONE ACETONIDE 40 MG/ML
40 INJECTION, SUSPENSION INTRA-ARTICULAR; INTRAMUSCULAR
Status: COMPLETED | OUTPATIENT
Start: 2021-09-16 | End: 2021-09-16

## 2021-09-16 RX ORDER — LIDOCAINE HYDROCHLORIDE 10 MG/ML
5 INJECTION, SOLUTION INFILTRATION; PERINEURAL
Status: COMPLETED | OUTPATIENT
Start: 2021-09-16 | End: 2021-09-16

## 2021-09-16 RX ORDER — LIDOCAINE HYDROCHLORIDE 10 MG/ML
4 INJECTION, SOLUTION INFILTRATION; PERINEURAL
Status: COMPLETED | OUTPATIENT
Start: 2021-09-16 | End: 2021-09-16

## 2021-09-16 RX ADMIN — LIDOCAINE HYDROCHLORIDE 5 ML: 10 INJECTION, SOLUTION INFILTRATION; PERINEURAL at 17:22

## 2021-09-16 RX ADMIN — TRIAMCINOLONE ACETONIDE 40 MG: 40 INJECTION, SUSPENSION INTRA-ARTICULAR; INTRAMUSCULAR at 17:22

## 2021-09-16 RX ADMIN — LIDOCAINE HYDROCHLORIDE 4 ML: 10 INJECTION, SOLUTION INFILTRATION; PERINEURAL at 17:22

## 2021-09-16 NOTE — PATIENT INSTRUCTIONS
Treatment for knee osteoarthritis depends on severity of cartilage wear and pain levels  First line for mild arthritis is exercise to strengthen the knee and allow better joint movement, 5-10% weight loss if overweight, and topical anti-inflammatories such as diclofenac gel or topical analgesic pain relief creams such as capsaicin  Symptoms at this stage usually improve in 3 months  Moderate to severe arthritis or arthritis not responding to first line treatments may require oral medicine such as anti-inflammatories (NSAIDs) such as ibuprofen/motrin, and if failing treatment with oral NSAIDs, then may consider depression medicines such as duloxetine (cymbalta) which also have been shown to work on pain receptors and help to control pain  Other analgesics such as tylenol (acetaminophen) may be used but do not appear to offer significant pain relief  Supplements such as glucosamine and chondroitin supplements  Some studies do show benefit from taking glucosamine sulfate (1500 mg/day) and chondroitin (800 mg/day) but evidence is controversial  I recommend against a type of glucosamine known as "glucosamine hydrochloride" which appears not as effective as glucosamine sulfate  If no improvement with oral medicines, then patients may consider steroid injection into the knee joint which provides pain relief  Steroid injections can be given every 3 months  Any sooner than that can  result in possible deterioration or cartilage in your joint  Other injections are available such as as viscosupplementation or gel shots into the knee which provide nutrients for the cartilage and can result in pain reduction  These viscosupplementation injections are generally considered every 6 months but are controversial   The [address] recommends against viscosupplementation injection; however, the [facility] recommends for these injections       Beyond these injections there are other controversial treatments including stem cell as well as platelet rich plasma injection which are out of pocket usually up to a 1000 dollars per injection  Lastly, if you fail conservative measures and have persistent pain, then we may consider referral to surgeon for knee replacement  ([facility]AM[facility] Moss 2018)

## 2021-09-17 LAB
APPEARANCE FLD: ABNORMAL
COLOR FLD: ABNORMAL
HISTIOCYTES NFR SNV MANUAL: 69 %
LYMPHOCYTES # SNV MANUAL: 28 %
NEUTROPHILS NFR SNV MANUAL: 3 %
RBC # SNV MANUAL: 3000 /UL (ref 0–10)
SITE: ABNORMAL
TOTAL CELLS COUNTED SPEC: 100
WBC # FLD MANUAL: 249 /UL

## 2021-09-20 LAB
BACTERIA SPEC BFLD CULT: NO GROWTH
GRAM STN SPEC: NORMAL
GRAM STN SPEC: NORMAL

## 2021-09-25 LAB — B BURGDOR DNA SPEC QL NAA+PROBE: NEGATIVE

## 2021-09-28 DIAGNOSIS — M51.36 DISC DEGENERATION, LUMBAR: ICD-10-CM

## 2021-09-28 DIAGNOSIS — M54.16 LUMBAR RADICULOPATHY: ICD-10-CM
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
## 2021-09-28 DIAGNOSIS — M25.561 ACUTE PAIN OF RIGHT KNEE: ICD-10-CM

## 2021-09-29 RX ORDER — OXYCODONE HYDROCHLORIDE 5 MG/1
5 TABLET ORAL EVERY 8 HOURS PRN
Qty: 20 TABLET | Refills: 0 | Status: SHIPPED | OUTPATIENT
Start: 2021-09-29 | End: 2021-10-12 | Stop reason: SDUPTHER

## 2021-09-29 RX ORDER — GABAPENTIN 600 MG/1
600 TABLET ORAL 3 TIMES DAILY
Qty: 270 TABLET | Refills: 1 | Status: SHIPPED | OUTPATIENT
Start: 2021-09-29 | End: 2021-12-30 | Stop reason: SDUPTHER

## 2021-09-29 NOTE — TELEPHONE ENCOUNTER
Please inform patient that the pain medication was refilled for temp so that she can have time to address the knee issue with orthopedics  Please have her use the oxycodone sparingly and ensure she reaches out to specialist for management options long term  Thanks,  Dr Yvonne Lea

## 2021-10-08 ENCOUNTER — TELEPHONE (OUTPATIENT)
Dept: FAMILY MEDICINE CLINIC | Facility: OTHER | Age: 66
End: 2021-10-08

## 2021-10-08 DIAGNOSIS — M25.561 ACUTE PAIN OF RIGHT KNEE: Primary | ICD-10-CM

## 2021-10-12 DIAGNOSIS — M25.561 ACUTE PAIN OF RIGHT KNEE: ICD-10-CM
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
## 2021-10-12 RX ORDER — OXYCODONE HYDROCHLORIDE 5 MG/1
5 TABLET ORAL EVERY 8 HOURS PRN
Qty: 20 TABLET | Refills: 0 | Status: SHIPPED | OUTPATIENT
Start: 2021-10-12 | End: 2021-10-25 | Stop reason: SDUPTHER

## 2021-10-13 ENCOUNTER — VBI (OUTPATIENT)
Dept: ADMINISTRATIVE | Facility: OTHER | Age: 66
End: 2021-10-13

## 2021-10-15 ENCOUNTER — RA CDI HCC (OUTPATIENT)
Dept: OTHER | Facility: HOSPITAL | Age: 66
End: 2021-10-15

## 2021-10-22 ENCOUNTER — OFFICE VISIT (OUTPATIENT)
Dept: FAMILY MEDICINE CLINIC | Facility: OTHER | Age: 66
End: 2021-10-22
Payer: COMMERCIAL

## 2021-10-22 VITALS
HEIGHT: 61 IN | WEIGHT: 191.9 LBS | HEART RATE: 82 BPM | DIASTOLIC BLOOD PRESSURE: 92 MMHG | TEMPERATURE: 98 F | BODY MASS INDEX: 36.23 KG/M2 | OXYGEN SATURATION: 96 % | RESPIRATION RATE: 22 BRPM | SYSTOLIC BLOOD PRESSURE: 178 MMHG

## 2021-10-22 DIAGNOSIS — F30.9 BIPOLAR I DISORDER, SINGLE MANIC EPISODE (HCC): ICD-10-CM

## 2021-10-22 DIAGNOSIS — Z23 NEED FOR VACCINATION: ICD-10-CM

## 2021-10-22 DIAGNOSIS — Z00.00 ENCOUNTER FOR ANNUAL WELLNESS VISIT (AWV) IN MEDICARE PATIENT: Primary | ICD-10-CM

## 2021-10-22 DIAGNOSIS — M17.11 PRIMARY OSTEOARTHRITIS OF RIGHT KNEE: ICD-10-CM

## 2021-10-22 PROCEDURE — 1125F AMNT PAIN NOTED PAIN PRSNT: CPT | Performed by: FAMILY MEDICINE

## 2021-10-22 PROCEDURE — 3288F FALL RISK ASSESSMENT DOCD: CPT | Performed by: FAMILY MEDICINE

## 2021-10-22 PROCEDURE — G0008 ADMIN INFLUENZA VIRUS VAC: HCPCS

## 2021-10-22 PROCEDURE — 1170F FXNL STATUS ASSESSED: CPT | Performed by: FAMILY MEDICINE

## 2021-10-22 PROCEDURE — 90662 IIV NO PRSV INCREASED AG IM: CPT

## 2021-10-22 PROCEDURE — 3725F SCREEN DEPRESSION PERFORMED: CPT | Performed by: FAMILY MEDICINE

## 2021-10-22 PROCEDURE — G0439 PPPS, SUBSEQ VISIT: HCPCS | Performed by: FAMILY MEDICINE

## 2021-10-22 RX ORDER — BUSPIRONE HYDROCHLORIDE 30 MG/1
30 TABLET ORAL 3 TIMES DAILY
Qty: 90 TABLET | Refills: 1 | Status: SHIPPED | OUTPATIENT
Start: 2021-10-22 | End: 2021-12-30 | Stop reason: SDUPTHER
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
## 2021-10-25 DIAGNOSIS — M25.561 ACUTE PAIN OF RIGHT KNEE: ICD-10-CM

## 2021-10-25 DIAGNOSIS — I10 ESSENTIAL HYPERTENSION: ICD-10-CM

## 2021-10-25 RX ORDER — AMLODIPINE BESYLATE 10 MG/1
10 TABLET ORAL DAILY
Qty: 90 TABLET | Refills: 1 | Status: SHIPPED | OUTPATIENT
Start: 2021-10-25 | End: 2022-01-21 | Stop reason: SDUPTHER

## 2021-10-26 RX ORDER — OXYCODONE HYDROCHLORIDE 5 MG/1
5 TABLET ORAL DAILY PRN
Qty: 30 TABLET | Refills: 0 | Status: SHIPPED | OUTPATIENT
Start: 2021-10-26 | End: 2021-11-17 | Stop reason: SDUPTHER

## 2021-10-29 ENCOUNTER — OFFICE VISIT (OUTPATIENT)
Dept: OBGYN CLINIC | Facility: CLINIC | Age: 66
End: 2021-10-29
Payer: COMMERCIAL

## 2021-10-29 VITALS
SYSTOLIC BLOOD PRESSURE: 170 MMHG | HEART RATE: 83 BPM | BODY MASS INDEX: 36.06 KG/M2 | DIASTOLIC BLOOD PRESSURE: 91 MMHG | WEIGHT: 191 LBS | HEIGHT: 61 IN

## 2021-10-29 DIAGNOSIS — M25.561 ACUTE PAIN OF RIGHT KNEE: ICD-10-CM

## 2021-10-29 DIAGNOSIS — M17.11 PRIMARY OSTEOARTHRITIS OF RIGHT KNEE: Primary | ICD-10-CM

## 2021-10-29 PROCEDURE — 3077F SYST BP >= 140 MM HG: CPT | Performed by: ORTHOPAEDIC SURGERY

## 2021-10-29 PROCEDURE — 1160F RVW MEDS BY RX/DR IN RCRD: CPT | Performed by: ORTHOPAEDIC SURGERY

## 2021-10-29 PROCEDURE — 4004F PT TOBACCO SCREEN RCVD TLK: CPT | Performed by: ORTHOPAEDIC SURGERY

## 2021-10-29 PROCEDURE — 99212 OFFICE O/P EST SF 10 MIN: CPT | Performed by: ORTHOPAEDIC SURGERY

## 2021-10-29 PROCEDURE — 3080F DIAST BP >= 90 MM HG: CPT | Performed by: ORTHOPAEDIC SURGERY

## 2021-10-29 PROCEDURE — 3008F BODY MASS INDEX DOCD: CPT | Performed by: ORTHOPAEDIC SURGERY

## 2021-11-17 DIAGNOSIS — M25.561 ACUTE PAIN OF RIGHT KNEE: ICD-10-CM
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
## 2021-11-17 RX ORDER — OXYCODONE HYDROCHLORIDE 5 MG/1
5 TABLET ORAL DAILY PRN
Qty: 30 TABLET | Refills: 0 | Status: SHIPPED | OUTPATIENT
Start: 2021-11-17 | End: 2021-12-20 | Stop reason: SDUPTHER

## 2021-11-22 ENCOUNTER — VBI (OUTPATIENT)
Dept: ADMINISTRATIVE | Facility: OTHER | Age: 66
End: 2021-11-22
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
## 2021-12-20 DIAGNOSIS — M25.561 ACUTE PAIN OF RIGHT KNEE: ICD-10-CM

## 2021-12-20 RX ORDER — OXYCODONE HYDROCHLORIDE 5 MG/1
5 TABLET ORAL DAILY PRN
Qty: 30 TABLET | Refills: 0 | Status: SHIPPED | OUTPATIENT
Start: 2021-12-20 | End: 2022-01-10 | Stop reason: SDUPTHER

## 2021-12-27 ENCOUNTER — OFFICE VISIT (OUTPATIENT)
Dept: FAMILY MEDICINE CLINIC | Facility: OTHER | Age: 66
End: 2021-12-27
Payer: COMMERCIAL

## 2021-12-27 ENCOUNTER — VBI (OUTPATIENT)
Dept: ADMINISTRATIVE | Facility: OTHER | Age: 66
End: 2021-12-27

## 2021-12-27 DIAGNOSIS — M25.569 CHRONIC KNEE PAIN, UNSPECIFIED LATERALITY: ICD-10-CM

## 2021-12-27 DIAGNOSIS — M54.50 CHRONIC LOW BACK PAIN, UNSPECIFIED BACK PAIN LATERALITY, UNSPECIFIED WHETHER SCIATICA PRESENT: Primary | ICD-10-CM

## 2021-12-27 DIAGNOSIS — G89.29 CHRONIC KNEE PAIN, UNSPECIFIED LATERALITY: ICD-10-CM

## 2021-12-27 DIAGNOSIS — G89.29 CHRONIC LOW BACK PAIN, UNSPECIFIED BACK PAIN LATERALITY, UNSPECIFIED WHETHER SCIATICA PRESENT: Primary | ICD-10-CM

## 2021-12-27 PROCEDURE — 4004F PT TOBACCO SCREEN RCVD TLK: CPT | Performed by: FAMILY MEDICINE

## 2021-12-27 PROCEDURE — 1160F RVW MEDS BY RX/DR IN RCRD: CPT | Performed by: FAMILY MEDICINE

## 2021-12-27 PROCEDURE — 3075F SYST BP GE 130 - 139MM HG: CPT | Performed by: FAMILY MEDICINE

## 2021-12-27 PROCEDURE — 99214 OFFICE O/P EST MOD 30 MIN: CPT | Performed by: FAMILY MEDICINE

## 2021-12-27 PROCEDURE — 3079F DIAST BP 80-89 MM HG: CPT | Performed by: FAMILY MEDICINE

## 2021-12-28 VITALS
TEMPERATURE: 97.8 F | SYSTOLIC BLOOD PRESSURE: 130 MMHG | WEIGHT: 189.38 LBS | DIASTOLIC BLOOD PRESSURE: 88 MMHG | BODY MASS INDEX: 35.78 KG/M2

## 2021-12-30 DIAGNOSIS — M51.36 DISC DEGENERATION, LUMBAR: ICD-10-CM

## 2021-12-30 DIAGNOSIS — F30.9 BIPOLAR I DISORDER, SINGLE MANIC EPISODE (HCC): ICD-10-CM

## 2021-12-30 DIAGNOSIS — M54.16 LUMBAR RADICULOPATHY: ICD-10-CM

## 2021-12-30 RX ORDER — GABAPENTIN 600 MG/1
600 TABLET ORAL 3 TIMES DAILY
Qty: 270 TABLET | Refills: 1 | Status: SHIPPED | OUTPATIENT
Start: 2021-12-30 | End: 2022-03-28 | Stop reason: SDUPTHER

## 2021-12-30 RX ORDER — BUSPIRONE HYDROCHLORIDE 30 MG/1
30 TABLET ORAL 3 TIMES DAILY
Qty: 90 TABLET | Refills: 1 | Status: SHIPPED | OUTPATIENT
Start: 2021-12-30 | End: 2022-03-01 | Stop reason: SDUPTHER

## 2022-01-05 ENCOUNTER — VBI (OUTPATIENT)
Dept: ADMINISTRATIVE | Facility: OTHER | Age: 67
End: 2022-01-05

## 2022-01-07 DIAGNOSIS — F31.9 BIPOLAR DEPRESSION (HCC): ICD-10-CM

## 2022-01-10 DIAGNOSIS — M25.561 ACUTE PAIN OF RIGHT KNEE: ICD-10-CM
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
## 2022-01-10 NOTE — TELEPHONE ENCOUNTER
Lilia called she needs refill on oxycodone, she states last visit and it was discussed that she can take up to two tablets if needed, she is running out, due to taking two, needs refill

## 2022-01-10 NOTE — TELEPHONE ENCOUNTER
Requested medication(s) are due for refill today: Yes  Patient has already received a courtesy refill: No  Other reason request has been forwarded to provider: Failed protocol     Evens Ricardo MA

## 2022-01-11 RX ORDER — OXYCODONE HYDROCHLORIDE 5 MG/1
TABLET ORAL
Qty: 45 TABLET | Refills: 0 | Status: SHIPPED | OUTPATIENT
Start: 2022-01-11 | End: 2022-02-15 | Stop reason: SDUPTHER

## 2022-01-21 DIAGNOSIS — I10 BENIGN ESSENTIAL HYPERTENSION: ICD-10-CM

## 2022-01-21 DIAGNOSIS — I10 ESSENTIAL HYPERTENSION: ICD-10-CM

## 2022-01-21 RX ORDER — LISINOPRIL 5 MG/1
5 TABLET ORAL DAILY
Qty: 90 TABLET | Refills: 1 | Status: SHIPPED | OUTPATIENT
Start: 2022-01-21 | End: 2022-07-11 | Stop reason: SDUPTHER

## 2022-01-21 RX ORDER — AMLODIPINE BESYLATE 10 MG/1
10 TABLET ORAL DAILY
Qty: 90 TABLET | Refills: 1 | Status: SHIPPED | OUTPATIENT
Start: 2022-01-21 | End: 2022-02-04 | Stop reason: SDUPTHER

## 2022-02-04 DIAGNOSIS — I10 ESSENTIAL HYPERTENSION: ICD-10-CM

## 2022-02-04 DIAGNOSIS — I10 BENIGN ESSENTIAL HYPERTENSION: ICD-10-CM

## 2022-02-04 RX ORDER — AMLODIPINE BESYLATE 10 MG/1
10 TABLET ORAL DAILY
Qty: 90 TABLET | Refills: 1 | Status: SHIPPED | OUTPATIENT
Start: 2022-02-04 | End: 2022-07-11 | Stop reason: SDUPTHER

## 2022-02-04 RX ORDER — CARVEDILOL 25 MG/1
25 TABLET ORAL 2 TIMES DAILY WITH MEALS
Qty: 180 TABLET | Refills: 1 | Status: SHIPPED | OUTPATIENT
Start: 2022-02-04 | End: 2022-08-02

## 2022-02-10 ENCOUNTER — CLINICAL SUPPORT (OUTPATIENT)
Dept: FAMILY MEDICINE CLINIC | Facility: OTHER | Age: 67
End: 2022-02-10
Payer: COMMERCIAL

## 2022-02-10 DIAGNOSIS — Z23 NEED FOR VACCINATION: Primary | ICD-10-CM

## 2022-02-10 PROCEDURE — G0008 ADMIN INFLUENZA VIRUS VAC: HCPCS

## 2022-02-10 PROCEDURE — 90662 IIV NO PRSV INCREASED AG IM: CPT
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
## 2022-02-15 DIAGNOSIS — M25.561 ACUTE PAIN OF RIGHT KNEE: ICD-10-CM

## 2022-02-15 RX ORDER — OXYCODONE HYDROCHLORIDE 5 MG/1
TABLET ORAL
Qty: 45 TABLET | Refills: 0 | Status: SHIPPED | OUTPATIENT
Start: 2022-02-15 | End: 2022-03-11 | Stop reason: SDUPTHER

## 2022-02-24 ENCOUNTER — CLINICAL SUPPORT (OUTPATIENT)
Dept: FAMILY MEDICINE CLINIC | Facility: OTHER | Age: 67
End: 2022-02-24
Payer: COMMERCIAL

## 2022-02-24 DIAGNOSIS — Z23 ENCOUNTER FOR IMMUNIZATION: Primary | ICD-10-CM

## 2022-02-24 PROCEDURE — G0009 ADMIN PNEUMOCOCCAL VACCINE: HCPCS

## 2022-02-24 PROCEDURE — 90732 PPSV23 VACC 2 YRS+ SUBQ/IM: CPT

## 2022-03-01 DIAGNOSIS — F30.9 BIPOLAR I DISORDER, SINGLE MANIC EPISODE (HCC): ICD-10-CM

## 2022-03-01 RX ORDER — BUSPIRONE HYDROCHLORIDE 30 MG/1
30 TABLET ORAL 3 TIMES DAILY
Qty: 90 TABLET | Refills: 1 | Status: SHIPPED | OUTPATIENT
Start: 2022-03-01 | End: 2022-04-27

## 2022-03-11 DIAGNOSIS — M25.561 ACUTE PAIN OF RIGHT KNEE: ICD-10-CM
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
## 2022-03-11 RX ORDER — OXYCODONE HYDROCHLORIDE 5 MG/1
TABLET ORAL
Qty: 45 TABLET | Refills: 0 | Status: SHIPPED | OUTPATIENT
Start: 2022-03-15 | End: 2022-04-14 | Stop reason: SDUPTHER

## 2022-03-21 ENCOUNTER — RA CDI HCC (OUTPATIENT)
Dept: OTHER | Facility: HOSPITAL | Age: 67
End: 2022-03-21

## 2022-03-21 NOTE — PROGRESS NOTES
[facility]  coding opportunities       Chart reviewed, no opportunity found: CHART REVIEWED, NO OPPORTUNITY FOUND        Patients Insurance     Medicare Insurance: [employer] [facility] [employer]

## 2022-03-28 ENCOUNTER — OFFICE VISIT (OUTPATIENT)
Dept: FAMILY MEDICINE CLINIC | Facility: OTHER | Age: 67
End: 2022-03-28
Payer: COMMERCIAL

## 2022-03-28 VITALS
OXYGEN SATURATION: 98 % | BODY MASS INDEX: 34.93 KG/M2 | RESPIRATION RATE: 20 BRPM | HEART RATE: 80 BPM | TEMPERATURE: 98 F | SYSTOLIC BLOOD PRESSURE: 150 MMHG | WEIGHT: 185 LBS | DIASTOLIC BLOOD PRESSURE: 80 MMHG | HEIGHT: 61 IN

## 2022-03-28 DIAGNOSIS — T78.3XXA ANGIO-EDEMA, INITIAL ENCOUNTER: ICD-10-CM

## 2022-03-28 DIAGNOSIS — T78.3XXA ANGIOEDEMA, INITIAL ENCOUNTER: ICD-10-CM

## 2022-03-28 DIAGNOSIS — M51.36 DISC DEGENERATION, LUMBAR: ICD-10-CM

## 2022-03-28 DIAGNOSIS — M47.26 OSTEOARTHRITIS OF SPINE WITH RADICULOPATHY, LUMBAR REGION: ICD-10-CM

## 2022-03-28 DIAGNOSIS — M54.16 LUMBAR RADICULOPATHY: ICD-10-CM

## 2022-03-28 DIAGNOSIS — K21.9 GASTROESOPHAGEAL REFLUX DISEASE, UNSPECIFIED WHETHER ESOPHAGITIS PRESENT: ICD-10-CM

## 2022-03-28 DIAGNOSIS — Z12.31 SCREENING MAMMOGRAM, ENCOUNTER FOR: Primary | ICD-10-CM

## 2022-03-28 PROCEDURE — 3008F BODY MASS INDEX DOCD: CPT | Performed by: FAMILY MEDICINE

## 2022-03-28 PROCEDURE — 99214 OFFICE O/P EST MOD 30 MIN: CPT | Performed by: FAMILY MEDICINE

## 2022-03-28 PROCEDURE — 3077F SYST BP >= 140 MM HG: CPT | Performed by: FAMILY MEDICINE

## 2022-03-28 PROCEDURE — 4004F PT TOBACCO SCREEN RCVD TLK: CPT | Performed by: FAMILY MEDICINE

## 2022-03-28 PROCEDURE — 1160F RVW MEDS BY RX/DR IN RCRD: CPT | Performed by: FAMILY MEDICINE

## 2022-03-28 PROCEDURE — 3079F DIAST BP 80-89 MM HG: CPT | Performed by: FAMILY MEDICINE

## 2022-03-28 RX ORDER — EPINEPHRINE 0.3 MG/.3ML
0.3 INJECTION SUBCUTANEOUS ONCE
Qty: 0.6 ML | Refills: 1 | Status: SHIPPED | OUTPATIENT
Start: 2022-03-28 | End: 2022-07-11

## 2022-03-28 RX ORDER — METHOCARBAMOL 500 MG/1
500 TABLET, FILM COATED ORAL
Qty: 30 TABLET | Refills: 1 | Status: SHIPPED | OUTPATIENT
Start: 2022-03-28 | End: 2022-07-11 | Stop reason: SDUPTHER

## 2022-03-28 RX ORDER — GABAPENTIN 600 MG/1
600 TABLET ORAL 3 TIMES DAILY
Qty: 270 TABLET | Refills: 1 | Status: SHIPPED | OUTPATIENT
Start: 2022-03-28

## 2022-03-28 RX ORDER — FAMOTIDINE 20 MG/1
20 TABLET, FILM COATED ORAL 2 TIMES DAILY
Qty: 60 TABLET | Refills: 5 | Status: SHIPPED | OUTPATIENT
Start: 2022-03-28 | End: 2022-07-11

## 2022-03-28 NOTE — PROGRESS NOTES
Assessment/Plan:    No problem-specific Assessment & Plan notes found for this encounter  Patient presents today for follow-up on lumbar radiculopathy and pain as well as knee pain bilaterally  She would like to continue with oxycodone and increase in intake from 1-2 per day to 2 per day regularly as the prior dosages not providing her adequate pain relief  In the meantime she will be seeing Orthopedics and neurosurgeon to address the back and knee condition  Of note the bone scan was done and showed normal bone density  Patient is encouraged to take calcium vitamin D and will check labs routinely  Patient will follow up with me in 3 months  Patient was advised to use medication with safety precautions and a prescription for Naloxone is available to her as well to use in emergent situations  She is agreeable to the plan of care  today blood pressure is elevated as she has not taken her medication yet  Patient was advised to take medicine check blood pressure and if it continues to stay above 140/90 to reach me for management options  Problem List Items Addressed This Visit        Nervous and Auditory    Lumbar radiculopathy    Relevant Medications:    Continues to take Robaxin as needed at bedtime as well as gabapentin regularly 3 times a day  Patient will be taking oxycodone 2 times per day to help relieve the pain mainly in the back and knee  While following with Orthopedics as well as neurosurgeon        methocarbamol (ROBAXIN) 500 mg tablet    gabapentin (NEURONTIN) 600 MG tablet    Other Relevant Orders    CBC and differential    Comprehensive metabolic panel    TSH, 3rd generation with Free T4 reflex    Vitamin D 25 hydroxy    Osteoarthritis of spine with radiculopathy, lumbar region    Relevant Medications    methocarbamol (ROBAXIN) 500 mg tablet    Other Relevant Orders    CBC and differential    Comprehensive metabolic panel    TSH, 3rd generation with Free T4 reflex    Vitamin D 25 hydroxy       Musculoskeletal and Integument    Disc degeneration, lumbar    Relevant Medications    gabapentin (NEURONTIN) 600 MG tablet    Other Relevant Orders    CBC and differential    Comprehensive metabolic panel    TSH, 3rd generation with Free T4 reflex    Vitamin D 25 hydroxy       Other    Angio-edema, initial encounter    Relevant Medications    EPINEPHrine (EPIPEN) 0 3 mg/0 3 mL SOAJ      Other Visit Diagnoses     Screening mammogram, encounter for    -  Primary:    Patient reminded to schedule a mammogram as it has been over 2  BMI 35 0-35 9,adult        Angioedema, initial encounter        Relevant Medications    famotidine (PEPCID) 20 mg tablet    Gastroesophageal reflux disease, unspecified whether esophagitis present        Relevant Medications    famotidine (PEPCID) 20 mg tablet        BMI Counseling: Body mass index is 34 96 kg/m²  The BMI is above normal  Nutrition recommendations include encouraging healthy choices of fruits and vegetables, limiting drinks that contain sugar and reducing intake of cholesterol  Rationale for BMI follow-up plan is due to patient being overweight or obese  Falls Plan of Care: Medications that increase falls were reviewed  Subjective:      Patient ID: Beni Villalpando is a 74 y o  female  Patient is here for fu visit to address knee pain and back pain  She states her back pain has been worsening gradually and she is discussing options with neurosurgery at this point  Has been needing oxycodone 2 times per day as taking 1 daily is not helping with her pain intesity  She likes to be active through out the day and do gardening and house chores as possible  She is agreeable to take responsibly and is aware of the adverse effects of the medication on its own and in combination to other medication she takes  She has Rx for Naloxone to take upon emergent situation  She however has not had any adverse reactions from taking the oxycodone    Denies any dizziness or falls  For the knee pain she has to have a knee injection with ortho for which she will call to schedule the visit  Last seen was about 3 months ago  Back pain is getting worse slowly and has MRI and CT scan awaiting to be done  She is planning to join PT once weekly and do home exercises and works with neurosurgery  Back Pain  This is a chronic problem  The current episode started more than 1 year ago  The problem occurs intermittently  The problem has been gradually worsening since onset  The pain is present in the lumbar spine and gluteal  The quality of the pain is described as aching and burning  The pain radiates to the right thigh, left thigh, left knee and right knee  The pain is moderate  The pain is worse during the day  Exacerbated by: activities as the day goes by  Stiffness is present all day  Associated symptoms include leg pain, numbness, paresis, paresthesias and weakness (knees bilaterally)  Pertinent negatives include no abdominal pain, bladder incontinence, bowel incontinence, chest pain, dysuria, fever, headaches or weight loss  Risk factors include menopause, obesity and sedentary lifestyle  She has tried home exercises (PT oxycodone and tylenol ) for the symptoms  The treatment provided mild relief         The following portions of the patient's history were reviewed and updated as appropriate:   Current Outpatient Medications   Medication Sig Dispense Refill    acetaminophen (TYLENOL) 650 mg CR tablet Take 1 tablet (650 mg total) by mouth every 8 (eight) hours as needed for moderate pain 30 tablet 0    albuterol (PROVENTIL HFA,VENTOLIN HFA) 90 mcg/act inhaler Inhale 2 puffs every 4 (four) hours as needed for wheezing or shortness of breath 18 g 1    amLODIPine (NORVASC) 10 mg tablet Take 1 tablet (10 mg total) by mouth daily 90 tablet 1    aspirin (ECOTRIN LOW STRENGTH) 81 mg EC tablet Take 81 mg by mouth daily      ASPIRIN-ACETAMINOPHEN-CAFFEINE PO Take by mouth  busPIRone (BUSPAR) 30 MG tablet Take 1 tablet (30 mg total) by mouth 3 (three) times a day 90 tablet 1    Calcium Carb-Cholecalciferol (OSCAL-D) 500 mg-200 units per tablet Take 1 tablet by mouth 2 (two) times a day with meals      carvedilol (COREG) 25 mg tablet Take 1 tablet (25 mg total) by mouth 2 (two) times a day with meals 180 tablet 1    diclofenac sodium (VOLTAREN) 1 % APPLY 2 GRAMS TO AFFECTED AREA 4 TIMES A  g 1    gabapentin (NEURONTIN) 600 MG tablet Take 1 tablet (600 mg total) by mouth 3 (three) times a day 270 tablet 1    lisinopril (ZESTRIL) 5 mg tablet Take 1 tablet (5 mg total) by mouth daily 90 tablet 1    NON FORMULARY Hempvana roll on cream for pain      oxyCODONE (ROXICODONE) 5 immediate release tablet Take 1 tab po daily to bid as needed for moderate to severe pain  45 tablet 0    sertraline (ZOLOFT) 50 mg tablet Take 1 tablet (50 mg total) by mouth daily 90 tablet 1    diphenhydrAMINE (BENADRYL) 25 mg tablet Take 1 tablet (25 mg total) by mouth every 6 (six) hours as needed for itching for up to 5 days 20 tablet 0    EPINEPHrine (EPIPEN) 0 3 mg/0 3 mL SOAJ Inject 0 3 mL (0 3 mg total) into a muscle once for 1 dose 0 6 mL 1    famotidine (PEPCID) 20 mg tablet Take 1 tablet (20 mg total) by mouth 2 (two) times a day 60 tablet 5    methocarbamol (ROBAXIN) 500 mg tablet Take 1 tablet (500 mg total) by mouth daily at bedtime as needed for muscle spasms 30 tablet 1    naloxone (NARCAN) 4 mg/0 1 mL nasal spray Administer 1 spray into a nostril  If no response after 2-3 minutes, give another dose in the other nostril using a new spray  (Patient not taking: Reported on 12/27/2021 ) 1 each 1     No current facility-administered medications for this visit  She is allergic to methyldopa       Review of Systems   Constitutional: Negative for appetite change, chills, diaphoresis, fever, unexpected weight change and weight loss  HENT: Negative for congestion      Respiratory: Negative for cough, shortness of breath and wheezing  Cardiovascular: Negative for chest pain and leg swelling  Gastrointestinal: Positive for nausea (associated with GERD)  Negative for abdominal pain, bowel incontinence, diarrhea and vomiting  Feels reflux symptoms and would like to have the pepcid Rx refilled today  Genitourinary: Negative for bladder incontinence and dysuria  Musculoskeletal: Positive for arthralgias, back pain and myalgias  Negative for neck stiffness  Skin: Negative for pallor and rash  Neurological: Positive for weakness (knees bilaterally), numbness and paresthesias  Negative for tremors, light-headedness and headaches  Psychiatric/Behavioral: Negative for sleep disturbance  Objective:      /80   Pulse 80   Temp 98 °F (36 7 °C)   Resp 20   Ht 5' 1" (1 549 m)   Wt 83 9 kg (185 lb)   SpO2 98%   BMI 34 96 kg/m²          Physical Exam  Vitals and nursing note reviewed  Constitutional:       General: She is not in acute distress  Appearance: She is well-developed  She is obese  She is not ill-appearing, toxic-appearing or diaphoretic  HENT:      Head: Normocephalic and atraumatic  Eyes:      General: No scleral icterus  Conjunctiva/sclera: Conjunctivae normal    Cardiovascular:      Rate and Rhythm: Normal rate and regular rhythm  Heart sounds: Normal heart sounds  No murmur heard  Pulmonary:      Effort: Pulmonary effort is normal  No respiratory distress  Breath sounds: Normal breath sounds  No wheezing  Abdominal:      General: Bowel sounds are normal       Palpations: Abdomen is soft  Musculoskeletal:         General: Tenderness (lumbar spine ) present  Normal range of motion  Cervical back: Normal range of motion and neck supple  No rigidity  Right lower leg: No edema  Left lower leg: No edema  Skin:     General: Skin is warm and dry  Coloration: Skin is not pale     Neurological:      Mental Status: She is alert and oriented to person, place, and time     Psychiatric:         Behavior: Behavior normal          Lab Results   Component Value Date    WBC 8 01 03/04/2021    HGB 14 4 03/04/2021    HCT 46 1 03/04/2021     03/04/2021    CHOL 199 09/30/2015    TRIG 113 07/08/2020    HDL 44 (L) 07/08/2020    ALT 20 03/04/2021    AST 12 03/04/2021     09/30/2015    K 4 6 03/04/2021     (H) 03/04/2021    CREATININE 1 38 (H) 03/04/2021    BUN 24 03/04/2021    CO2 27 03/04/2021    GLUF 98 07/12/2018    HGBA1C 5 6 07/08/2020     Lab Results   Component Value Date    [id number] 2 423 03/04/2021

## 2022-04-14 DIAGNOSIS — M25.561 ACUTE PAIN OF RIGHT KNEE: ICD-10-CM
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
## 2022-04-14 RX ORDER — OXYCODONE HYDROCHLORIDE 5 MG/1
TABLET ORAL
Qty: 45 TABLET | Refills: 0 | Status: SHIPPED | OUTPATIENT
Start: 2022-04-14 | End: 2022-05-12 | Stop reason: SDUPTHER

## 2022-04-27 DIAGNOSIS — F30.9 BIPOLAR I DISORDER, SINGLE MANIC EPISODE (HCC): ICD-10-CM

## 2022-04-27 RX ORDER — BUSPIRONE HYDROCHLORIDE 30 MG/1
TABLET ORAL
Qty: 90 TABLET | Refills: 1 | Status: SHIPPED | OUTPATIENT
Start: 2022-04-27 | End: 2022-05-23

## 2022-05-12 DIAGNOSIS — M25.561 ACUTE PAIN OF RIGHT KNEE: ICD-10-CM
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
## 2022-05-12 RX ORDER — OXYCODONE HYDROCHLORIDE 5 MG/1
TABLET ORAL
Qty: 45 TABLET | Refills: 0 | Status: SHIPPED | OUTPATIENT
Start: 2022-05-12 | End: 2022-06-03 | Stop reason: SDUPTHER

## 2022-05-22 DIAGNOSIS — F30.9 BIPOLAR I DISORDER, SINGLE MANIC EPISODE (HCC): ICD-10-CM

## 2022-05-23 RX ORDER — BUSPIRONE HYDROCHLORIDE 30 MG/1
TABLET ORAL
Qty: 90 TABLET | Refills: 1 | Status: SHIPPED | OUTPATIENT
Start: 2022-05-23 | End: 2022-06-27
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
## 2022-06-03 DIAGNOSIS — M25.561 ACUTE PAIN OF RIGHT KNEE: ICD-10-CM

## 2022-06-03 RX ORDER — OXYCODONE HYDROCHLORIDE 5 MG/1
TABLET ORAL
Qty: 60 TABLET | Refills: 0 | Status: SHIPPED | OUTPATIENT
Start: 2022-06-09 | End: 2022-06-27

## 2022-06-03 NOTE — TELEPHONE ENCOUNTER
Refilled and increased to 60 tablets to last for the full month or longer as we discussed during past office visits she shouldn't use it bid regularly for safety reasons  Also she needs to come to office for the opiate agreement and urine test as this is the new policy with [facility] so providers can safely refill your pain medication  Please set this up before her next refill  Thanks!

## 2022-06-03 NOTE — TELEPHONE ENCOUNTER
The patient called stating that she has been running out of her script within 23 days  There are days that she is only taking one but most days she is taking two

## 2022-06-29 DIAGNOSIS — F31.9 BIPOLAR DEPRESSION (HCC): ICD-10-CM

## 2022-07-11 ENCOUNTER — OFFICE VISIT (OUTPATIENT)
Dept: FAMILY MEDICINE CLINIC | Facility: OTHER | Age: 67
End: 2022-07-11
Payer: COMMERCIAL

## 2022-07-11 VITALS
DIASTOLIC BLOOD PRESSURE: 82 MMHG | RESPIRATION RATE: 16 BRPM | TEMPERATURE: 98 F | HEART RATE: 86 BPM | WEIGHT: 177 LBS | SYSTOLIC BLOOD PRESSURE: 148 MMHG | BODY MASS INDEX: 33.42 KG/M2 | HEIGHT: 61 IN | OXYGEN SATURATION: 98 %

## 2022-07-11 DIAGNOSIS — I10 BENIGN ESSENTIAL HYPERTENSION: ICD-10-CM

## 2022-07-11 DIAGNOSIS — F11.20 CONTINUOUS OPIOID DEPENDENCE (HCC): ICD-10-CM

## 2022-07-11 DIAGNOSIS — G89.4 PAIN SYNDROME, CHRONIC: Primary | ICD-10-CM

## 2022-07-11 DIAGNOSIS — M54.16 LUMBAR RADICULOPATHY: ICD-10-CM

## 2022-07-11 DIAGNOSIS — I10 ESSENTIAL HYPERTENSION: ICD-10-CM

## 2022-07-11 DIAGNOSIS — M47.26 OSTEOARTHRITIS OF SPINE WITH RADICULOPATHY, LUMBAR REGION: ICD-10-CM

## 2022-07-11 DIAGNOSIS — Z12.31 SCREENING MAMMOGRAM, ENCOUNTER FOR: ICD-10-CM

## 2022-07-11 DIAGNOSIS — E78.2 MIXED HYPERLIPIDEMIA: ICD-10-CM

## 2022-07-11 PROCEDURE — 3077F SYST BP >= 140 MM HG: CPT | Performed by: FAMILY MEDICINE

## 2022-07-11 PROCEDURE — 99214 OFFICE O/P EST MOD 30 MIN: CPT | Performed by: FAMILY MEDICINE

## 2022-07-11 PROCEDURE — 1160F RVW MEDS BY RX/DR IN RCRD: CPT | Performed by: FAMILY MEDICINE

## 2022-07-11 PROCEDURE — 3079F DIAST BP 80-89 MM HG: CPT | Performed by: FAMILY MEDICINE

## 2022-07-11 RX ORDER — LISINOPRIL 5 MG/1
5 TABLET ORAL DAILY
Qty: 90 TABLET | Refills: 1 | Status: SHIPPED | OUTPATIENT
Start: 2022-07-11

## 2022-07-11 RX ORDER — AMLODIPINE BESYLATE 10 MG/1
10 TABLET ORAL DAILY
Qty: 90 TABLET | Refills: 1 | Status: SHIPPED | OUTPATIENT
Start: 2022-07-11

## 2022-07-11 RX ORDER — OXYCODONE HYDROCHLORIDE 5 MG/1
5 CAPSULE ORAL 2 TIMES DAILY PRN
Qty: 60 CAPSULE | Refills: 0 | Status: SHIPPED | OUTPATIENT
Start: 2022-07-11 | End: 2022-08-12 | Stop reason: SDUPTHER

## 2022-07-11 RX ORDER — OXYCODONE HYDROCHLORIDE 5 MG/1
5 CAPSULE ORAL 2 TIMES DAILY
COMMUNITY
End: 2022-07-11 | Stop reason: SDUPTHER

## 2022-07-11 RX ORDER — METHOCARBAMOL 500 MG/1
500 TABLET, FILM COATED ORAL
Qty: 30 TABLET | Refills: 1 | Status: SHIPPED | OUTPATIENT
Start: 2022-07-11

## 2022-07-11 NOTE — PATIENT INSTRUCTIONS
Goals of care:  Maximize your health and quality of life by:   · Increasing your level of function and activity  · Decreasing the negative effects of pain on your life  · Minimizing the risks and side effects of medications and ensuring safe use of opioid medication     Ways for you to help meet your goals:  Maintain a healthy lifestyle  This includes proper nutrition, regular physical activity as able, try for 8 hours of sleep per night, use stress reduction strategies, avoid triggers  Risks and side effects of opioid use:  Prescription opioids carry serious risks of addiction and  overdose, especially with prolonged use  An opioid overdose,  often marked by slowed breathing, can cause sudden death  The  use of prescription opioids can have a number of side effects as  well, even when taken as directed:  · Tolerance--meaning you might need to take more of a medication for the same pain relief  · Physical dependence--meaning you have symptoms of withdrawal when a medication is stopped  · Increased sensitivity to pain  · Constipation  · Nausea, vomiting, dry mouth  · Sleepiness and dizziness   · Confusion  · Depression  · Low levels of testosterone that can result in lower sex drive, energy, and strength  · Itching and sweating    If you are prescribed opioids for pain:  · Never take opioids in greater amounts or more often than prescribed  · Help prevent misuse and abuse  - Never sell or share prescription opioids         - Never use another persons prescription opioids  · Store prescription opioids in a secure place and out of reach of others (this may include visitors, children, friends, and family)  · Safely dispose of unused prescription opioids: Find your community drug take-back program or your pharmacy mail-back program, or flush them down the toilet, following guidance from the Food and Drug Administration (www fda gov/Drugs/ResourcesForYou)    · Visit www cdc gov/drugoverdose to learn about the risks of opioid abuse and overdose  · If you believe you may be struggling with addiction, tell your health care provider and ask for guidance or call [phone] [place] at [phone]-BKKF

## 2022-07-11 NOTE — PROGRESS NOTES
Assessment/Plan     Problem List Items Addressed This Visit        Cardiovascular and Mediastinum    Benign essential hypertension    Relevant Medications    lisinopril (ZESTRIL) 5 mg tablet    amLODIPine (NORVASC) 10 mg tablet       Nervous and Auditory    Lumbar radiculopathy    Relevant Medications    oxyCODONE (OXY-IR) 5 MG capsule    methocarbamol (ROBAXIN) 500 mg tablet    Osteoarthritis of spine with radiculopathy, lumbar region    Relevant Medications    methocarbamol (ROBAXIN) 500 mg tablet       Other    Hyperlipidemia    Relevant Orders    Lipid panel    Pain syndrome, chronic - Primary    Relevant Medications    oxyCODONE (OXY-IR) 5 MG capsule      Other Visit Diagnoses     Screening mammogram, encounter for        Relevant Orders    Mammo screening bilateral w 3d & cad    Continuous opioid dependence (HCC)        Essential hypertension        Relevant Medications    lisinopril (ZESTRIL) 5 mg tablet    amLODIPine (NORVASC) 10 mg tablet         Opioid Management Plan      Subjective         Pain related diagnoses: lumbar radiculopathy    Current pain description: Spasm and sharp pain in the lower back and legs both sides up to knees  Legs are feeling weak but she has PT and water therapy that she is planning to schedule  Functional status: Depends on family for grocery shopping and laundry but able to cook for self  Able to take medication herself and lives with daughter  Goals of care: To be able to be independent     Social Support System  Patient receives support from their: daughter    Good support at home from daughter     Screening Tools/Assessments:    PHQ-2/9:  Last PHQ-2 score: 2 (Last PHQ-2 date: 10/22/2021)  Last PHQ-9 score: 14 (Last PHQ-9 date: 8/30/2021)    Brief Pain Inventory (BPI):  1) Throughout our lives, most of us have had pain from time to time (such as minor headaches, sprains, and toothaches)  Have you had pain other than these everyday kinds of pain today?  Yes  2) Where is your pain located? 3) Rate your pain at its worst in the last 24 hours: 7  4) Rate your pain at its least in the last 24 hours: 4  5) Rate your average level of pain: 8  6) Rate your pain right now: 5  7) What treatments or medications are you receiving for your pain?  oxycodone 5 mg  8) In the past 24 hours, how much relief have pain treatments or medication provided? 70%  9) During the past 24 hours, pain has interfered with your:    A) General activity: 9     B) Mood: 8     C) Walking ability: 10     D) Normal work (work outside the home & housework): 10     E) Relations with other people: 3     F) Sleep: 7     G) Enjoyment of life: 9    Opioid agreement:  Active Opioid agreement on file?: No    Opioid agreement signed date: 7/8/2020  Opioid agreement expiration date: 7/8/2021    Naloxone:  Currently prescribed Naloxone (Narcan): Yes      High risk medications  High risk meds taken in last 72 hours: oxycodone     HPI  Pain Medications             acetaminophen (TYLENOL) 650 mg CR tablet Take 1 tablet (650 mg total) by mouth every 8 (eight) hours as needed for moderate pain    aspirin (ECOTRIN LOW STRENGTH) 81 mg EC tablet Take 81 mg by mouth daily    ASPIRIN-ACETAMINOPHEN-CAFFEINE PO Take by mouth    gabapentin (NEURONTIN) 600 MG tablet Take 1 tablet (600 mg total) by mouth 3 (three) times a day    methocarbamol (ROBAXIN) 500 mg tablet Take 1 tablet (500 mg total) by mouth daily at bedtime as needed for muscle spasms    oxyCODONE (OXY-IR) 5 MG capsule Take 1 capsule (5 mg total) by mouth 2 (two) times a day as needed for moderate pain Max Daily Amount: 10 mg    sertraline (ZOLOFT) 50 mg tablet TAKE 1 TABLET BY MOUTH EVERY DAY         Outpatient Morphine Milligram Equivalents Per Day     7/18/22 and after 15 MME/Day    Order Name Dose Route Frequency Maximum MME/Day     oxyCODONE (OXY-IR) 5 MG capsule 5 mg Oral 2 times daily PRN 15 MME/Day    Total Potential Morphine Milligram Equivalents Per Day 15 MME/Day    Calculation Information        oxyCODONE (OXY-IR) 5 MG capsule    oxyCODONE 5 MG Caps: maximum daily dose of 10 mg * morphine equivalence factor of 1 5 = 15 MME/Day                         PDMP Review       Value Time User    PDMP Reviewed  Yes 7/11/2022  2:18 PM Saleem Tejada MD         Review of Systems   Constitutional: Negative for chills, fever and unexpected weight change  HENT: Negative for congestion  Eyes: Negative for visual disturbance  Respiratory: Negative for cough, shortness of breath and wheezing  Cardiovascular: Negative for chest pain and leg swelling  Genitourinary: Negative for dysuria  Musculoskeletal: Positive for arthralgias, back pain and myalgias  Skin: Negative for pallor, rash and wound  Neurological: Negative for tremors, weakness, light-headedness and headaches  Objective     /82   Pulse 86   Temp 98 °F (36 7 °C)   Resp 16   Ht 5' 1" (1 549 m)   Wt 80 3 kg (177 lb)   SpO2 98%   BMI 33 44 kg/m²     Physical Exam  Constitutional:       General: She is not in acute distress  Appearance: Normal appearance  She is not diaphoretic  HENT:      Head: Normocephalic and atraumatic  Eyes:      General: No scleral icterus  Right eye: No discharge  Left eye: No discharge  Conjunctiva/sclera: Conjunctivae normal    Cardiovascular:      Rate and Rhythm: Normal rate and regular rhythm  Pulses: Normal pulses  Heart sounds: Normal heart sounds  Pulmonary:      Effort: Pulmonary effort is normal  No respiratory distress  Breath sounds: Normal breath sounds  Abdominal:      General: Bowel sounds are normal       Palpations: Abdomen is soft  Musculoskeletal:         General: Normal range of motion  Cervical back: Normal range of motion and neck supple  No rigidity  Right lower leg: No edema  Left lower leg: No edema  Neurological:      General: No focal deficit present        Mental Status: She is alert and oriented to person, place, and time     Psychiatric:         Behavior: Behavior normal          Lab Results   Component Value Date    WBC 8 01 03/04/2021    HGB 14 4 03/04/2021    HCT 46 1 03/04/2021     03/04/2021    CHOL 199 09/30/2015    TRIG 113 07/08/2020    HDL 44 (L) 07/08/2020    ALT 20 03/04/2021    AST 12 03/04/2021     09/30/2015    K 4 6 03/04/2021     (H) 03/04/2021    CREATININE 1 38 (H) 03/04/2021    BUN 24 03/04/2021    CO2 27 03/04/2021    GLUF 98 07/12/2018    HGBA1C 5 6 07/08/2020     Lab Results   Component Value Date    [id number] 2 423 03/04/2021       Miller Rea MD

## 2022-07-13 ENCOUNTER — TELEPHONE (OUTPATIENT)
Dept: FAMILY MEDICINE CLINIC | Facility: OTHER | Age: 67
End: 2022-07-13

## 2022-07-13 NOTE — TELEPHONE ENCOUNTER
Called patient to ask her if she can stop by to sign the last page of the agreement for use of opiods and/or controlled substances page    The paperwork is at the  if she calls or stops in

## 2022-08-02 DIAGNOSIS — I10 BENIGN ESSENTIAL HYPERTENSION: ICD-10-CM

## 2022-08-02 RX ORDER — CARVEDILOL 25 MG/1
TABLET ORAL
Qty: 180 TABLET | Refills: 1 | Status: SHIPPED | OUTPATIENT
Start: 2022-08-02

## 2022-08-12 ENCOUNTER — TELEPHONE (OUTPATIENT)
Dept: FAMILY MEDICINE CLINIC | Facility: OTHER | Age: 67
End: 2022-08-12

## 2022-08-12 DIAGNOSIS — G89.4 PAIN SYNDROME, CHRONIC: ICD-10-CM

## 2022-08-12 DIAGNOSIS — M54.16 LUMBAR RADICULOPATHY: ICD-10-CM

## 2022-08-12 LAB
25(OH)D3 SERPL-MCNC: 28 NG/ML (ref 30–100)
ALBUMIN SERPL-MCNC: 3.3 G/DL (ref 3.6–5.1)
ALBUMIN/GLOB SERPL: 0.8 (CALC) (ref 1–2.5)
ALP SERPL-CCNC: 62 U/L (ref 37–153)
ALT SERPL-CCNC: 7 U/L (ref 6–29)
AST SERPL-CCNC: 9 U/L (ref 10–35)
BASOPHILS # BLD AUTO: 69 CELLS/UL (ref 0–200)
BASOPHILS NFR BLD AUTO: 1 %
BILIRUB SERPL-MCNC: 0.2 MG/DL (ref 0.2–1.2)
BUN SERPL-MCNC: 25 MG/DL (ref 7–25)
BUN/CREAT SERPL: 16 (CALC) (ref 6–22)
CALCIUM SERPL-MCNC: 9.6 MG/DL (ref 8.6–10.4)
CHLORIDE SERPL-SCNC: 107 MMOL/L (ref 98–110)
CHOLEST SERPL-MCNC: 177 MG/DL
CHOLEST/HDLC SERPL: 4.8 (CALC)
CO2 SERPL-SCNC: 29 MMOL/L (ref 20–32)
CREAT SERPL-MCNC: 1.56 MG/DL (ref 0.5–1.05)
EOSINOPHIL # BLD AUTO: 283 CELLS/UL (ref 15–500)
EOSINOPHIL NFR BLD AUTO: 4.1 %
ERYTHROCYTE [DISTWIDTH] IN BLOOD BY AUTOMATED COUNT: 14.7 % (ref 11–15)
GFR/BSA.PRED SERPLBLD CYS-BASED-ARV: 36 ML/MIN/1.73M2
GLOBULIN SER CALC-MCNC: 4.3 G/DL (CALC) (ref 1.9–3.7)
GLUCOSE SERPL-MCNC: 98 MG/DL (ref 65–99)
HCT VFR BLD AUTO: 44.5 % (ref 35–45)
HDLC SERPL-MCNC: 37 MG/DL
HGB BLD-MCNC: 14.4 G/DL (ref 11.7–15.5)
LDLC SERPL CALC-MCNC: 118 MG/DL (CALC)
LYMPHOCYTES # BLD AUTO: 1497 CELLS/UL (ref 850–3900)
LYMPHOCYTES NFR BLD AUTO: 21.7 %
MCH RBC QN AUTO: 30.8 PG (ref 27–33)
MCHC RBC AUTO-ENTMCNC: 32.4 G/DL (ref 32–36)
MCV RBC AUTO: 95.1 FL (ref 80–100)
MONOCYTES # BLD AUTO: 531 CELLS/UL (ref 200–950)
MONOCYTES NFR BLD AUTO: 7.7 %
NEUTROPHILS # BLD AUTO: 4520 CELLS/UL (ref 1500–7800)
NEUTROPHILS NFR BLD AUTO: 65.5 %
NONHDLC SERPL-MCNC: 140 MG/DL (CALC)
PLATELET # BLD AUTO: 278 THOUSAND/UL (ref 140–400)
PMV BLD REES-ECKER: 10.8 FL (ref 7.5–12.5)
POTASSIUM SERPL-SCNC: 4.8 MMOL/L (ref 3.5–5.3)
PROT SERPL-MCNC: 7.6 G/DL (ref 6.1–8.1)
RBC # BLD AUTO: 4.68 MILLION/UL (ref 3.8–5.1)
RPR SER QL: NORMAL
SODIUM SERPL-SCNC: 142 MMOL/L (ref 135–146)
TRIGL SERPL-MCNC: 108 MG/DL
WBC # BLD AUTO: 6.9 THOUSAND/UL (ref 3.8–10.8)

## 2022-08-12 RX ORDER — OXYCODONE HYDROCHLORIDE 5 MG/1
5 CAPSULE ORAL 2 TIMES DAILY PRN
Qty: 60 CAPSULE | Refills: 0 | Status: SHIPPED | OUTPATIENT
Start: 2022-08-12 | End: 2022-09-12 | Stop reason: SDUPTHER

## 2022-08-12 NOTE — TELEPHONE ENCOUNTER
----- Message from Ricarda Brown MD sent at 8/12/2022  9:34 AM EDT -----  The blood work result is stable other then low vit D level  For now continue with current regimen and take vit D OTC supplement

## 2022-09-12 DIAGNOSIS — G89.4 PAIN SYNDROME, CHRONIC: ICD-10-CM

## 2022-09-12 DIAGNOSIS — M54.16 LUMBAR RADICULOPATHY: ICD-10-CM

## 2022-09-12 RX ORDER — OXYCODONE HYDROCHLORIDE 5 MG/1
5 CAPSULE ORAL 2 TIMES DAILY PRN
Qty: 60 CAPSULE | Refills: 0 | Status: SHIPPED | OUTPATIENT
Start: 2022-09-12 | End: 2022-10-10 | Stop reason: SDUPTHER

## 2022-09-22 DIAGNOSIS — F30.9 BIPOLAR I DISORDER, SINGLE MANIC EPISODE (HCC): ICD-10-CM

## 2022-09-22 DIAGNOSIS — T78.3XXA ANGIOEDEMA, INITIAL ENCOUNTER: ICD-10-CM

## 2022-09-22 DIAGNOSIS — K21.9 GASTROESOPHAGEAL REFLUX DISEASE, UNSPECIFIED WHETHER ESOPHAGITIS PRESENT: ICD-10-CM

## 2022-09-23 RX ORDER — BUSPIRONE HYDROCHLORIDE 30 MG/1
TABLET ORAL
Qty: 270 TABLET | Refills: 0 | Status: SHIPPED | OUTPATIENT
Start: 2022-09-23

## 2022-09-23 RX ORDER — FAMOTIDINE 20 MG/1
TABLET, FILM COATED ORAL
Qty: 180 TABLET | Refills: 1 | Status: SHIPPED | OUTPATIENT
Start: 2022-09-23

## 2022-09-26 DIAGNOSIS — M51.36 DISC DEGENERATION, LUMBAR: ICD-10-CM

## 2022-09-26 DIAGNOSIS — F31.9 BIPOLAR DEPRESSION (HCC): ICD-10-CM

## 2022-09-26 DIAGNOSIS — M54.16 LUMBAR RADICULOPATHY: ICD-10-CM

## 2022-09-26 NOTE — TELEPHONE ENCOUNTER
Pt called in asking for a refill of these two prescriptions gabapentin (NEURONTIN) 600 MG tablet and sertraline (ZOLOFT) 50 mg tablet

## 2022-09-28 RX ORDER — GABAPENTIN 600 MG/1
600 TABLET ORAL 3 TIMES DAILY
Qty: 270 TABLET | Refills: 1 | Status: SHIPPED | OUTPATIENT
Start: 2022-09-28

## 2022-09-29 NOTE — TELEPHONE ENCOUNTER
Tried calling patient phone picks up "not receiving calls at this time'   Regarding her rx sent into pharmacy

## 2022-10-06 ENCOUNTER — HOSPITAL ENCOUNTER (OUTPATIENT)
Dept: MAMMOGRAPHY | Facility: HOSPITAL | Age: 67
Discharge: HOME | End: 2022-10-06
Attending: FAMILY MEDICINE
Payer: COMMERCIAL

## 2022-10-06 VITALS — WEIGHT: 177.03 LBS | BODY MASS INDEX: 33.42 KG/M2 | HEIGHT: 61 IN

## 2022-10-06 DIAGNOSIS — Z12.31 SCREENING MAMMOGRAM, ENCOUNTER FOR: ICD-10-CM

## 2022-10-06 PROCEDURE — 77063 BREAST TOMOSYNTHESIS BI: CPT

## 2022-10-06 PROCEDURE — 77067 SCR MAMMO BI INCL CAD: CPT

## 2022-10-10 DIAGNOSIS — M54.16 LUMBAR RADICULOPATHY: ICD-10-CM

## 2022-10-10 DIAGNOSIS — G89.4 PAIN SYNDROME, CHRONIC: ICD-10-CM

## 2022-10-11 RX ORDER — OXYCODONE HYDROCHLORIDE 5 MG/1
5 CAPSULE ORAL 2 TIMES DAILY PRN
Qty: 60 CAPSULE | Refills: 0 | Status: SHIPPED | OUTPATIENT
Start: 2022-10-12

## 2022-10-27 ENCOUNTER — TELEMEDICINE (OUTPATIENT)
Dept: FAMILY MEDICINE CLINIC | Facility: OTHER | Age: 67
End: 2022-10-27
Payer: COMMERCIAL

## 2022-10-27 DIAGNOSIS — M54.16 LUMBAR RADICULOPATHY: Primary | ICD-10-CM

## 2022-10-27 DIAGNOSIS — R29.898 WEAKNESS OF BOTH LOWER EXTREMITIES: ICD-10-CM

## 2022-10-27 DIAGNOSIS — M51.36 DISC DEGENERATION, LUMBAR: ICD-10-CM

## 2022-10-27 DIAGNOSIS — I73.9 CLAUDICATION OF BOTH LOWER EXTREMITIES (HCC): ICD-10-CM

## 2022-10-27 PROCEDURE — 99442 PR PHYS/QHP TELEPHONE EVALUATION 11-20 MIN: CPT | Performed by: FAMILY MEDICINE

## 2022-10-27 NOTE — PROGRESS NOTES
Virtual Brief Visit    Patient is located in the following state in which I hold an active license [place]      Assessment/Plan:    Problem List Items Addressed This Visit        Nervous and Auditory    Lumbar radiculopathy - Primary       Musculoskeletal and Integument    Disc degeneration, lumbar      Other Visit Diagnoses     Weakness of both lower extremities        Claudication of both lower extremities ([address] )            Patient to reach out to neurosurgery for follow up eval   Continue with current regimen of pain medication and muscle relaxant  Fall precautions and avoid exercise if not tolerated  FU in 2-4 weeks         HPI:      Patient presents today for   Follow-up of back pain  She has history of degenerative disc disease of lumbar region with radiculopathy for several years however since August of 2022 she has been feeling discomfort with ambulation and she has noted weakness in the lower extremity bilaterally and and ability to exercise due to back pain  She notes the discomfort in her back as well as the lower extremity within a minute of walking  She denies having any incontinence symptoms she denies fever chills she denies dysuria symptoms  She does not have recent trauma or falls  This condition has been a chronic ongoing issue she has seen Dr Benjy Aguilar from Neurosurgery evaluation at that time suggested  She might benefit from kyphoplasty  She was told by the intervention radiologist that she may not be a good candidate for kyphoplasty at the time  She is taking muscle relaxant as well as the pain medication she is on however the discomfort has grown significantly worse and at this point her exercise capabilities severely affected  Back Pain  This is a chronic problem  The current episode started more than 1 month ago  The problem occurs constantly  The problem has been gradually worsening since onset  The pain is present in the lumbar spine   The quality of the pain is described as aching, burning and shooting  Radiates to: both lower extremities  The pain is moderate  Worse during: worse with ambulation for about one minute and then she starts to feel the discomfort  Associated symptoms include leg pain, numbness, paresis and weakness  Pertinent negatives include no abdominal pain, bladder incontinence, chest pain, dysuria, fever or pelvic pain  Risk factors include menopause, obesity and sedentary lifestyle  She has tried analgesics, bed rest and muscle relaxant for the symptoms  The treatment provided no relief         Current Outpatient Medications:   •  acetaminophen (TYLENOL) 650 mg CR tablet, Take 1 tablet (650 mg total) by mouth every 8 (eight) hours as needed for moderate pain, Disp: 30 tablet, Rfl: 0  •  albuterol (PROVENTIL HFA,VENTOLIN HFA) 90 mcg/act inhaler, Inhale 2 puffs every 4 (four) hours as needed for wheezing or shortness of breath, Disp: 18 g, Rfl: 1  •  amLODIPine (NORVASC) 10 mg tablet, Take 1 tablet (10 mg total) by mouth daily, Disp: 90 tablet, Rfl: 1  •  aspirin (ECOTRIN LOW STRENGTH) 81 mg EC tablet, Take 81 mg by mouth daily, Disp: , Rfl:   •  ASPIRIN-ACETAMINOPHEN-CAFFEINE PO, Take by mouth, Disp: , Rfl:   •  busPIRone (BUSPAR) 30 MG tablet, TAKE 1 TABLET BY MOUTH THREE TIMES A DAY, Disp: 270 tablet, Rfl: 0  •  Calcium Carb-Cholecalciferol (OSCAL-D) 500 mg-200 units per tablet, Take 1 tablet by mouth 2 (two) times a day with meals, Disp: , Rfl:   •  carvedilol (COREG) 25 mg tablet, TAKE 1 TABLET BY MOUTH TWICE A DAY WITH FOOD, Disp: 180 tablet, Rfl: 1  •  diclofenac sodium (VOLTAREN) 1 %, APPLY 2 GRAMS TO AFFECTED AREA 4 TIMES A DAY, Disp: 100 g, Rfl: 1  •  diphenhydrAMINE (BENADRYL) 25 mg tablet, Take 1 tablet (25 mg total) by mouth every 6 (six) hours as needed for itching for up to 5 days, Disp: 20 tablet, Rfl: 0  •  EPINEPHrine (EPIPEN) 0 3 mg/0 3 mL SOAJ, Inject 0 3 mL (0 3 mg total) into a muscle once for 1 dose, Disp: 0 6 mL, Rfl: 1  •  famotidine (PEPCID) 20 mg tablet, TAKE 1 TABLET BY MOUTH TWICE A DAY, Disp: 180 tablet, Rfl: 1  •  gabapentin (NEURONTIN) 600 MG tablet, Take 1 tablet (600 mg total) by mouth 3 (three) times a day, Disp: 270 tablet, Rfl: 1  •  lisinopril (ZESTRIL) 5 mg tablet, Take 1 tablet (5 mg total) by mouth daily, Disp: 90 tablet, Rfl: 1  •  methocarbamol (ROBAXIN) 500 mg tablet, Take 1 tablet (500 mg total) by mouth daily at bedtime as needed for muscle spasms, Disp: 30 tablet, Rfl: 1  •  naloxone (NARCAN) 4 mg/0 1 mL nasal spray, Administer 1 spray into a nostril  If no response after 2-3 minutes, give another dose in the other nostril using a new spray , Disp: 1 each, Rfl: 1  •  NON FORMULARY, Hempvana roll on cream for pain, Disp: , Rfl:   •  oxyCODONE (OXY-IR) 5 MG capsule, Take 1 capsule (5 mg total) by mouth 2 (two) times a day as needed for moderate pain Max Daily Amount: 10 mg Do not start before October 12, 2022 , Disp: 60 capsule, Rfl: 0  •  sertraline (ZOLOFT) 50 mg tablet, Take 1 tablet (50 mg total) by mouth daily, Disp: 90 tablet, Rfl: 1    Recent Visits  No visits were found meeting these conditions  Showing recent visits within past 7 days and meeting all other requirements  Today's Visits  Date Type Provider Dept   10/27/22 Telemedicine Mary Rodriguez MD [facility]   Showing today's visits and meeting all other requirements  Future Appointments  No visits were found meeting these conditions    Showing future appointments within next 150 days and meeting all other requirements         Visit Time    Visit Start Time: 09:25  Visit Stop Time: 72:79:89  Total Visit Duration: 20 minutes

## 2022-11-11 DIAGNOSIS — M54.16 LUMBAR RADICULOPATHY: ICD-10-CM

## 2022-11-11 DIAGNOSIS — G89.4 PAIN SYNDROME, CHRONIC: ICD-10-CM

## 2022-11-11 RX ORDER — OXYCODONE HYDROCHLORIDE 5 MG/1
5 CAPSULE ORAL 2 TIMES DAILY PRN
Qty: 60 CAPSULE | Refills: 0 | Status: SHIPPED | OUTPATIENT
Start: 2022-11-11

## 2022-11-17 ENCOUNTER — IMMUNIZATIONS (OUTPATIENT)
Dept: FAMILY MEDICINE CLINIC | Facility: OTHER | Age: 67
End: 2022-11-17

## 2022-11-17 DIAGNOSIS — Z23 ENCOUNTER FOR IMMUNIZATION: Primary | ICD-10-CM

## 2022-12-08 DIAGNOSIS — G89.4 PAIN SYNDROME, CHRONIC: ICD-10-CM

## 2022-12-08 DIAGNOSIS — I10 BENIGN ESSENTIAL HYPERTENSION: ICD-10-CM

## 2022-12-08 DIAGNOSIS — F31.9 BIPOLAR DEPRESSION (HCC): ICD-10-CM

## 2022-12-08 DIAGNOSIS — I10 ESSENTIAL HYPERTENSION: ICD-10-CM

## 2022-12-08 DIAGNOSIS — M54.16 LUMBAR RADICULOPATHY: ICD-10-CM

## 2022-12-08 RX ORDER — AMLODIPINE BESYLATE 10 MG/1
10 TABLET ORAL DAILY
Qty: 90 TABLET | Refills: 1 | Status: SHIPPED | OUTPATIENT
Start: 2022-12-08

## 2022-12-08 RX ORDER — LISINOPRIL 5 MG/1
5 TABLET ORAL DAILY
Qty: 90 TABLET | Refills: 1 | Status: SHIPPED | OUTPATIENT
Start: 2022-12-08

## 2022-12-08 RX ORDER — OXYCODONE HYDROCHLORIDE 5 MG/1
5 CAPSULE ORAL 2 TIMES DAILY PRN
Qty: 60 CAPSULE | Refills: 0 | Status: SHIPPED | OUTPATIENT
Start: 2022-12-09

## 2022-12-08 NOTE — TELEPHONE ENCOUNTER
The patient stated she has taken an extra pill of zoloft at least 3 x this cycle  She has been very upset and crying and will take an extra pill at night time before bed

## 2022-12-15 DIAGNOSIS — F30.9 BIPOLAR I DISORDER, SINGLE MANIC EPISODE (HCC): ICD-10-CM

## 2022-12-15 RX ORDER — BUSPIRONE HYDROCHLORIDE 30 MG/1
TABLET ORAL
Qty: 270 TABLET | Refills: 0 | Status: SHIPPED | OUTPATIENT
Start: 2022-12-15

## 2022-12-27 DIAGNOSIS — M54.16 LUMBAR RADICULOPATHY: ICD-10-CM

## 2022-12-27 DIAGNOSIS — M51.36 DISC DEGENERATION, LUMBAR: ICD-10-CM

## 2022-12-27 RX ORDER — GABAPENTIN 600 MG/1
600 TABLET ORAL 3 TIMES DAILY
Qty: 270 TABLET | Refills: 1 | Status: SHIPPED | OUTPATIENT
Start: 2022-12-27

## 2022-12-30 ENCOUNTER — RA CDI HCC (OUTPATIENT)
Dept: OTHER | Facility: HOSPITAL | Age: 67
End: 2022-12-30

## 2022-12-30 NOTE — PROGRESS NOTES
[facility]  coding opportunities       Chart reviewed, no opportunity found:   [address]        Patients Insurance     Medicare Insurance: [employer]vantage

## 2023-01-05 ENCOUNTER — OFFICE VISIT (OUTPATIENT)
Dept: FAMILY MEDICINE CLINIC | Facility: OTHER | Age: 68
End: 2023-01-05

## 2023-01-05 VITALS
SYSTOLIC BLOOD PRESSURE: 108 MMHG | OXYGEN SATURATION: 98 % | HEIGHT: 61 IN | HEART RATE: 63 BPM | TEMPERATURE: 98 F | DIASTOLIC BLOOD PRESSURE: 70 MMHG | RESPIRATION RATE: 18 BRPM | WEIGHT: 170.6 LBS | BODY MASS INDEX: 32.21 KG/M2

## 2023-01-05 DIAGNOSIS — F11.20 CONTINUOUS OPIOID DEPENDENCE (HCC): ICD-10-CM

## 2023-01-05 DIAGNOSIS — E55.9 VITAMIN D INSUFFICIENCY: ICD-10-CM

## 2023-01-05 DIAGNOSIS — M47.26 OSTEOARTHRITIS OF SPINE WITH RADICULOPATHY, LUMBAR REGION: ICD-10-CM

## 2023-01-05 DIAGNOSIS — T78.3XXS ANGIOEDEMA, SEQUELA: ICD-10-CM

## 2023-01-05 DIAGNOSIS — M79.18 MYOFASCIAL PAIN SYNDROME: ICD-10-CM

## 2023-01-05 DIAGNOSIS — Z00.00 MEDICARE ANNUAL WELLNESS VISIT, SUBSEQUENT: ICD-10-CM

## 2023-01-05 DIAGNOSIS — M54.16 LUMBAR RADICULOPATHY: ICD-10-CM

## 2023-01-05 DIAGNOSIS — K21.9 GASTROESOPHAGEAL REFLUX DISEASE, UNSPECIFIED WHETHER ESOPHAGITIS PRESENT: ICD-10-CM

## 2023-01-05 DIAGNOSIS — N18.31 STAGE 3A CHRONIC KIDNEY DISEASE (HCC): ICD-10-CM

## 2023-01-05 DIAGNOSIS — Z12.31 SCREENING MAMMOGRAM, ENCOUNTER FOR: ICD-10-CM

## 2023-01-05 DIAGNOSIS — I10 BENIGN ESSENTIAL HYPERTENSION: Primary | ICD-10-CM

## 2023-01-05 RX ORDER — METHOCARBAMOL 500 MG/1
500 TABLET, FILM COATED ORAL
Qty: 30 TABLET | Refills: 1 | Status: SHIPPED | OUTPATIENT
Start: 2023-01-05

## 2023-01-05 RX ORDER — NALOXONE HYDROCHLORIDE 4 MG/.1ML
SPRAY NASAL
Qty: 1 EACH | Refills: 1 | Status: SHIPPED | OUTPATIENT
Start: 2023-01-05

## 2023-01-05 RX ORDER — CARVEDILOL 25 MG/1
25 TABLET ORAL 2 TIMES DAILY WITH MEALS
Qty: 180 TABLET | Refills: 1 | Status: SHIPPED | OUTPATIENT
Start: 2023-01-05

## 2023-01-05 RX ORDER — FAMOTIDINE 20 MG/1
20 TABLET, FILM COATED ORAL 2 TIMES DAILY
Qty: 180 TABLET | Refills: 1 | Status: SHIPPED | OUTPATIENT
Start: 2023-01-05

## 2023-01-05 NOTE — PROGRESS NOTES
Assessment and Plan:     Problem List Items Addressed This Visit        Cardiovascular and Mediastinum    Benign essential hypertension - Primary    Relevant Medications    carvedilol (COREG) 25 mg tablet    Other Relevant Orders    CBC and differential    Comprehensive metabolic panel    TSH, 3rd generation with Free T4 reflex    Lipid panel    HEMOGLOBIN A1C W/ EAG ESTIMATION    Vitamin D 25 hydroxy    UA w Reflex to Microscopic w Reflex to Culture -Lab Collect       Nervous and Auditory    Lumbar radiculopathy    Relevant Medications    methocarbamol (ROBAXIN) 500 mg tablet    Osteoarthritis of spine with radiculopathy, lumbar region    Relevant Medications    methocarbamol (ROBAXIN) 500 mg tablet       Musculoskeletal and Integument    Myofascial pain syndrome    Relevant Medications    naloxone (NARCAN) 4 mg/0 1 mL nasal spray       Genitourinary    Stage 3a chronic kidney disease (HCC)    Relevant Orders    CBC and differential    Comprehensive metabolic panel    TSH, 3rd generation with Free T4 reflex    Lipid panel    HEMOGLOBIN A1C W/ EAG ESTIMATION    Vitamin D 25 hydroxy    UA w Reflex to Microscopic w Reflex to Culture -Lab Collect   Other Visit Diagnoses     Medicare annual wellness visit, subsequent        Vitamin D insufficiency        Relevant Orders    CBC and differential    Comprehensive metabolic panel    TSH, 3rd generation with Free T4 reflex    Lipid panel    HEMOGLOBIN A1C W/ EAG ESTIMATION    Vitamin D 25 hydroxy    UA w Reflex to Microscopic w Reflex to Culture -Lab Collect    Screening mammogram, encounter for        Relevant Orders    Mammo screening bilateral w 3d & cad    Continuous opioid dependence (HCC)        BMI 32 0-32 9,adult        Angioedema, sequela        Relevant Medications    famotidine (PEPCID) 20 mg tablet    Gastroesophageal reflux disease, unspecified whether esophagitis present        Relevant Medications    famotidine (PEPCID) 20 mg tablet        BMI Counseling: Body mass index is 32 23 kg/m²  The BMI is above normal  Nutrition recommendations include encouraging healthy choices of fruits and vegetables, limiting drinks that contain sugar and reducing intake of saturated and trans fat  Rationale for BMI follow-up plan is due to patient being overweight or obese  Preventive health issues were discussed with patient, and age appropriate screening tests were ordered as noted in patient's After Visit Summary  Personalized health advice and appropriate referrals for health education or preventive services given if needed, as noted in patient's After Visit Summary  History of Present Illness:     Patient presents for a Medicare Wellness Visit    Patient presents for AWV and routine follow up visit today  No concerns or new issues noted  Would like some of her medication refilled today  Patient Care Team:  Charity Elmore MD as PCP - General (Family Medicine)  Tapan Perez DO     Review of Systems:     Review of Systems   Constitutional: Negative for appetite change, chills, fatigue, fever and unexpected weight change  HENT: Negative for congestion, ear pain, rhinorrhea and sore throat  Eyes: Negative for visual disturbance  Respiratory: Negative for cough, chest tightness and shortness of breath  Cardiovascular: Negative for chest pain, palpitations and leg swelling  Gastrointestinal: Negative for abdominal pain, blood in stool, constipation and diarrhea  Endocrine: Negative for cold intolerance, heat intolerance, polydipsia, polyphagia and polyuria  Genitourinary: Negative for dysuria, flank pain, menstrual problem and vaginal discharge  Musculoskeletal: Positive for arthralgias, back pain and myalgias  Negative for neck stiffness  Skin: Negative for rash  Allergic/Immunologic: Negative for environmental allergies  Neurological: Negative for dizziness, weakness and headaches  Hematological: Negative for adenopathy  Psychiatric/Behavioral: Negative for behavioral problems, decreased concentration and sleep disturbance  The patient is not nervous/anxious and is not hyperactive           Problem List:     Patient Active Problem List   Diagnosis   • Backache   • Benign essential hypertension   • Bipolar I disorder, single manic episode (HCC)   • Disc degeneration, lumbar   • 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   • Hyperlipidemia   • Leg cramps, sleep related   • Lower back pain   • Lumbar radiculopathy   • Myofascial pain syndrome   • Pain of lower leg   • Pain syndrome, chronic   • Osteoarthritis of spine with radiculopathy, lumbar region   • Angio-edema, initial encounter   • Elevated troponin   • Screening for colon cancer   • Endometrial thickening on ultrasound   • Bipolar disorder, unspecified ([facility] [address] )   • Morbid (severe) obesity due to excess calories (HCC)   • Nicotine dependence   • Bone lesion   • Stage 3a chronic kidney disease (HCC)   • Acute pain of right knee      Past Medical and Surgical History:     Past Medical History:   Diagnosis Date   • Anxiety    • Depression    • Fatty liver    • Hyperlipidemia    • Hypertension    • Psychiatric disorder    • Varicella      Past Surgical History:   Procedure Laterality Date   • BREAST SURGERY Bilateral     Reduction Procedure   • CARDIAC SURGERY     •  SECTION      x4   • DILATION AND CURETTAGE OF UTERUS     • ECTOPIC PREGNANCY SURGERY        Family History:     Family History   Problem Relation Age of Onset   • Lung cancer Mother    • Cirrhosis Father    • Hypertension Sister    • Bipolar disorder Sister    • Heart disease Sister    • Diabetes Family    • Heart disease Family    • Hypertension Family    • Stroke Family    • Thyroid disease Family       Social History:     Social History     Socioeconomic History   • Marital status:      Spouse name: Not on file   • Number of children: Not on file   • Years of education: Not on file   • Highest education level: Not on file   Occupational History   • Occupation: retired   Tobacco Use   • Smoking status: Every Day     Packs/day: 1 00     Types: Cigarettes   • Smokeless tobacco: Never   • Tobacco comments:     2 Black and milds per day   Vaping Use   • Vaping Use: Never used   Substance and Sexual Activity   • Alcohol use: Not Currently   • Drug use: Yes     Types: Marijuana     Comment: for pain   • Sexual activity: Not Currently     Partners: Male     Birth control/protection: None   Other Topics Concern   • Not on file   Social History Narrative    Educational level-special ed/completed Master's Degree     Social Determinants of Health     Financial Resource Strain: Low Risk    • Difficulty of Paying Living Expenses: Not hard at all   Food Insecurity: Not on file   Transportation Needs: No Transportation Needs   • Lack of Transportation (Medical): No   • Lack of Transportation (Non-Medical): No   Physical Activity: Not on file   Stress: Not on file   Social Connections: Not on file   Intimate Partner Violence: Not on file   Housing Stability: Not on file      Medications and Allergies:     Current Outpatient Medications   Medication Sig Dispense Refill   • carvedilol (COREG) 25 mg tablet Take 1 tablet (25 mg total) by mouth 2 (two) times a day with meals 180 tablet 1   • famotidine (PEPCID) 20 mg tablet Take 1 tablet (20 mg total) by mouth 2 (two) times a day 180 tablet 1   • methocarbamol (ROBAXIN) 500 mg tablet Take 1 tablet (500 mg total) by mouth daily at bedtime as needed for muscle spasms 30 tablet 1   • naloxone (NARCAN) 4 mg/0 1 mL nasal spray Administer 1 spray into a nostril  If no response after 2-3 minutes, give another dose in the other nostril using a new spray   1 each 1   • acetaminophen (TYLENOL) 650 mg CR tablet Take 1 tablet (650 mg total) by mouth every 8 (eight) hours as needed for moderate pain 30 tablet 0   • albuterol (PROVENTIL HFA,VENTOLIN HFA) 90 mcg/act inhaler Inhale 2 puffs every 4 (four) hours as needed for wheezing or shortness of breath 18 g 1   • amLODIPine (NORVASC) 10 mg tablet Take 1 tablet (10 mg total) by mouth daily 90 tablet 1   • aspirin (ECOTRIN LOW STRENGTH) 81 mg EC tablet Take 81 mg by mouth daily     • ASPIRIN-ACETAMINOPHEN-CAFFEINE PO Take by mouth     • busPIRone (BUSPAR) 30 MG tablet TAKE 1 TABLET BY MOUTH THREE TIMES A  tablet 0   • Calcium Carb-Cholecalciferol (OSCAL-D) 500 mg-200 units per tablet Take 1 tablet by mouth 2 (two) times a day with meals     • diclofenac sodium (VOLTAREN) 1 % APPLY 2 GRAMS TO AFFECTED AREA 4 TIMES A  g 1   • diphenhydrAMINE (BENADRYL) 25 mg tablet Take 1 tablet (25 mg total) by mouth every 6 (six) hours as needed for itching for up to 5 days 20 tablet 0   • EPINEPHrine (EPIPEN) 0 3 mg/0 3 mL SOAJ Inject 0 3 mL (0 3 mg total) into a muscle once for 1 dose 0 6 mL 1   • gabapentin (NEURONTIN) 600 MG tablet Take 1 tablet (600 mg total) by mouth 3 (three) times a day 270 tablet 1   • lisinopril (ZESTRIL) 5 mg tablet Take 1 tablet (5 mg total) by mouth daily 90 tablet 1   • NON FORMULARY Hempvana roll on cream for pain     • oxyCODONE (OXY-IR) 5 MG capsule Take 1 capsule (5 mg total) by mouth 2 (two) times a day as needed for moderate pain Max Daily Amount: 10 mg Do not start before December 9, 2022  60 capsule 0   • sertraline (ZOLOFT) 50 mg tablet Take 1 tablet (50 mg total) by mouth daily 90 tablet 1     No current facility-administered medications for this visit       Allergies   Allergen Reactions   • Methyldopa Shortness Of Breath and Other (See Comments)     Aldomet      Immunizations:     Immunization History   Administered Date(s) Administered   • COVID-19 PFIZER VACCINE 0 3 ML IM 03/03/2021, 04/21/2021   • Influenza, high dose seasonal 0 7 mL 10/19/2020, 10/22/2021, 02/10/2022, 11/17/2022   • Pneumococcal Conjugate 13-Valent 11/05/2020   • Pneumococcal Conjugate Vaccine 20-valent (Pcv20), Polysace 11/17/2022   • Pneumococcal Polysaccharide PPV23 02/24/2022, 02/24/2022      Health Maintenance:         Topic Date Due   • Cervical Cancer Screening  Never done   • Colorectal Cancer Screening  03/22/2024   • Breast Cancer Screening: Mammogram  10/06/2024   • Hepatitis C Screening  Completed         Topic Date Due   • Hepatitis B Vaccine (1 of 3 - 3-dose series) Never done   • COVID-19 Vaccine (3 - Booster for Pfizer series) 06/16/2021      Medicare Screening Tests and Risk Assessments:     Cristian Watters is here for her Subsequent Wellness visit  Last Medicare Wellness visit information reviewed, patient interviewed and updates made to the record today  Health Risk Assessment:   Patient rates overall health as fair  Patient feels that their physical health rating is slightly worse  Patient is dissatisfied with their life  Eyesight was rated as slightly worse  Hearing was rated as same  Patient feels that their emotional and mental health rating is slightly better  Patients states they are never, rarely angry  Patient states they are sometimes unusually tired/fatigued  Pain experienced in the last 7 days has been a lot  Patient's pain rating has been 7/10  Patient states that she has experienced no weight loss or gain in last 6 months  Depression Screening:   PHQ-2 Score: 2      Fall Risk Screening: In the past year, patient has experienced: no history of falling in past year      Urinary Incontinence Screening:   Patient has leaked urine accidently in the last six months  Home Safety:  Patient has trouble with stairs inside or outside of their home  Patient has working smoke alarms and has no working carbon monoxide detector  Home safety hazards include: poor household lighting  Lack of  bars in bath tub    Nutrition:   Current diet is Regular  Low salt diet    Medications:   Patient is currently taking over-the-counter supplements  OTC medications include: see medication list  Patient is able to manage medications  Activities of Daily Living (ADLs)/Instrumental Activities of Daily Living (IADLs):   Walk and transfer into and out of bed and chair?: Yes  Dress and groom yourself?: Yes    Bathe or shower yourself?: Yes    Feed yourself? Yes  Do your laundry/housekeeping?: Yes  Manage your money, pay your bills and track your expenses?: Yes  Make your own meals?: Yes    Do your own shopping?: Yes    Previous Hospitalizations:   Any hospitalizations or ED visits within the last 12 months?: Yes    How many hospitalizations have you had in the last year?: 1-2    Advance Care Planning:   Living will: No    Durable POA for healthcare: No    Advanced directive: No      Cognitive Screening:   Provider or family/friend/caregiver concerned regarding cognition?: No    PREVENTIVE SCREENINGS      Cardiovascular Screening:    General: Screening Not Indicated and History Lipid Disorder      Diabetes Screening:     General: Screening Current      Colorectal Cancer Screening:     General: Screening Current      Breast Cancer Screening:     General: Screening Current      Cervical Cancer Screening:    General: Screening Not Indicated      Abdominal Aortic Aneurysm (AAA) Screening:        General: Screening Not Indicated      Lung Cancer Screening:     General: Screening Not Indicated      Hepatitis C Screening:    General: Screening Current    Screening, Brief Intervention, and Referral to Treatment (SBIRT)    Screening  Typical number of drinks in a day: 0  Typical number of drinks in a week: 0  Interpretation: Low risk drinking behavior      Single Item Drug Screening:  How often have you used an illegal drug (including marijuana) or a prescription medication for non-medical reasons in the past year? never    Single Item Drug Screen Score: 0  Interpretation: Negative screen for possible drug use disorder    Review of Current Opioid Use    Opioid Risk Tool (ORT) Interpretation: Complete Opioid Risk Tool (ORT)    Other Counseling Topics: Car/seat belt/driving safety, skin self-exam, sunscreen and calcium and vitamin D intake and regular weightbearing exercise  No results found  Physical Exam:     /70   Pulse 63   Temp 98 °F (36 7 °C)   Resp 18   Ht 5' 1" (1 549 m)   Wt 77 4 kg (170 lb 9 6 oz)   SpO2 98%   BMI 32 23 kg/m²     Physical Exam  Constitutional:       General: She is not in acute distress  Appearance: Normal appearance  She is not ill-appearing or diaphoretic  HENT:      Head: Normocephalic and atraumatic  Eyes:      General: No scleral icterus  Conjunctiva/sclera: Conjunctivae normal    Cardiovascular:      Rate and Rhythm: Normal rate and regular rhythm  Heart sounds: Normal heart sounds  No murmur heard  Pulmonary:      Effort: Pulmonary effort is normal  No respiratory distress  Breath sounds: Normal breath sounds  No wheezing  Abdominal:      General: Bowel sounds are normal       Palpations: Abdomen is soft  Tenderness: There is no abdominal tenderness  Musculoskeletal:      Cervical back: Normal range of motion and neck supple  No rigidity  Right lower leg: No edema  Left lower leg: No edema  Lymphadenopathy:      Cervical: No cervical adenopathy  Skin:     General: Skin is warm and dry  Coloration: Skin is not pale  Findings: No rash  Neurological:      General: No focal deficit present  Mental Status: She is alert and oriented to person, place, and time          Lab Results   Component Value Date    WBC 6 9 08/11/2022    HGB 14 4 08/11/2022    HCT 44 5 08/11/2022     08/11/2022    CHOL 199 09/30/2015    TRIG 108 08/11/2022    HDL 37 (L) 08/11/2022    ALT 7 08/11/2022    AST 9 (L) 08/11/2022     09/30/2015    K 4 8 08/11/2022     08/11/2022    CREATININE 1 56 (H) 08/11/2022    BUN 25 08/11/2022    CO2 29 08/11/2022    GLUF 98 07/12/2018    HGBA1C 5 6 07/08/2020     Lab Results   Component Value Date    TOZ3YIVJSNVS 2 423 03/04/2021         Timur Rosen MD

## 2023-01-05 NOTE — PATIENT INSTRUCTIONS
Medicare Preventive Visit Patient Instructions  Thank you for completing your Welcome to Medicare Visit or Medicare Annual Wellness Visit today  Your next wellness visit will be due in one year (1/6/2024)  The screening/preventive services that you may require over the next 5-10 years are detailed below  Some tests may not apply to you based off risk factors and/or age  Screening tests ordered at today's visit but not completed yet may show as past due  Also, please note that scanned in results may not display below  Preventive Screenings:  Service Recommendations Previous Testing/Comments   Colorectal Cancer Screening  * Colonoscopy    * Fecal Occult Blood Test (FOBT)/Fecal Immunochemical Test (FIT)  * Fecal DNA/Cologuard Test  * Flexible Sigmoidoscopy Age: 39-70 years old   Colonoscopy: every 10 years (may be performed more frequently if at higher risk)  OR  FOBT/FIT: every 1 year  OR  Cologuard: every 3 years  OR  Sigmoidoscopy: every 5 years  Screening may be recommended earlier than age 39 if at higher risk for colorectal cancer  Also, an individualized decision between you and your healthcare provider will decide whether screening between the ages of 74-80 would be appropriate  Colonoscopy: Not on file  FOBT/FIT: Not on file  Cologuard: 03/22/2021  Sigmoidoscopy: Not on file    Screening Current     Breast Cancer Screening Age: 36 years old  Frequency: every 1-2 years  Not required if history of left and right mastectomy Mammogram: 10/06/2022    Screening Current   Cervical Cancer Screening Between the ages of 21-29, pap smear recommended once every 3 years  Between the ages of 33-67, can perform pap smear with HPV co-testing every 5 years     Recommendations may differ for women with a history of total hysterectomy, cervical cancer, or abnormal pap smears in past  Pap Smear: Not on file    Screening Not Indicated   Hepatitis C Screening Once for adults born between 1945 and 1965  More frequently in patients at high risk for Hepatitis C Hep C Antibody: 07/08/2020    Screening Current   Diabetes Screening 1-2 times per year if you're at risk for diabetes or have pre-diabetes Fasting glucose: 98 mg/dL (7/12/2018)  A1C: 5 6 % of total Hgb (7/8/2020)  Screening Current   Cholesterol Screening Once every 5 years if you don't have a lipid disorder  May order more often based on risk factors  Lipid panel: 08/11/2022    Screening Not Indicated  History Lipid Disorder     Other Preventive Screenings Covered by Medicare:  1  Abdominal Aortic Aneurysm (AAA) Screening: covered once if your at risk  You're considered to be at risk if you have a family history of AAA  2  Lung Cancer Screening: covers low dose CT scan once per year if you meet all of the following conditions: (1) Age 50-69; (2) No signs or symptoms of lung cancer; (3) Current smoker or have quit smoking within the last 15 years; (4) You have a tobacco smoking history of at least 20 pack years (packs per day multiplied by number of years you smoked); (5) You get a written order from a healthcare provider  3  Glaucoma Screening: covered annually if you're considered high risk: (1) You have diabetes OR (2) Family history of glaucoma OR (3)  aged 48 and older OR (3)  American aged 72 and older  3  Osteoporosis Screening: covered every 2 years if you meet one of the following conditions: (1) You're estrogen deficient and at risk for osteoporosis based off medical history and other findings; (2) Have a vertebral abnormality; (3) On glucocorticoid therapy for more than 3 months; (4) Have primary hyperparathyroidism; (5) On osteoporosis medications and need to assess response to drug therapy  · Last bone density test (DXA Scan): 02/25/2021   5  HIV Screening: covered annually if you're between the age of 15-65  Also covered annually if you are younger than 13 and older than 72 with risk factors for HIV infection   For pregnant patients, it is covered up to 3 times per pregnancy  Immunizations:  Immunization Recommendations   Influenza Vaccine Annual influenza vaccination during flu season is recommended for all persons aged >= 6 months who do not have contraindications   Pneumococcal Vaccine   * Pneumococcal conjugate vaccine = PCV13 (Prevnar 13), PCV15 (Vaxneuvance), PCV20 (Prevnar 20)  * Pneumococcal polysaccharide vaccine = PPSV23 (Pneumovax) Adults 25-60 years old: 1-3 doses may be recommended based on certain risk factors  Adults 72 years old: 1-2 doses may be recommended based off what pneumonia vaccine you previously received   Hepatitis B Vaccine 3 dose series if at intermediate or high risk (ex: diabetes, end stage renal disease, liver disease)   Tetanus (Td) Vaccine - COST NOT COVERED BY MEDICARE PART B Following completion of primary series, a booster dose should be given every 10 years to maintain immunity against tetanus  Td may also be given as tetanus wound prophylaxis  Tdap Vaccine - COST NOT COVERED BY MEDICARE PART B Recommended at least once for all adults  For pregnant patients, recommended with each pregnancy  Shingles Vaccine (Shingrix) - COST NOT COVERED BY MEDICARE PART B  2 shot series recommended in those aged 48 and above     Health Maintenance Due:      Topic Date Due   • Cervical Cancer Screening  Never done   • Colorectal Cancer Screening  03/22/2024   • Breast Cancer Screening: Mammogram  10/06/2024   • Hepatitis C Screening  Completed     Immunizations Due:      Topic Date Due   • Hepatitis B Vaccine (1 of 3 - 3-dose series) Never done   • COVID-19 Vaccine (3 - Booster for Loja Peter series) 06/16/2021     Advance Directives   What are advance directives? Advance directives are legal documents that state your wishes and plans for medical care  These plans are made ahead of time in case you lose your ability to make decisions for yourself   Advance directives can apply to any medical decision, such as the treatments you want, and if you want to donate organs  What are the types of advance directives? There are many types of advance directives, and each state has rules about how to use them  You may choose a combination of any of the following:  · Living will: This is a written record of the treatment you want  You can also choose which treatments you do not want, which to limit, and which to stop at a certain time  This includes surgery, medicine, IV fluid, and tube feedings  · Durable power of  for healthcare [facility]): This is a written record that states who you want to make healthcare choices for you when you are unable to make them for yourself  This person, called a proxy, is usually a family member or a friend  You may choose more than 1 proxy  · Do not resuscitate (DNR) order:  A DNR order is used in case your heart stops beating or you stop breathing  It is a request not to have certain forms of treatment, such as CPR  A DNR order may be included in other types of advance directives  · Medical directive: This covers the care that you want if you are in a coma, near death, or unable to make decisions for yourself  You can list the treatments you want for each condition  Treatment may include pain medicine, surgery, blood transfusions, dialysis, IV or tube feedings, and a ventilator (breathing machine)  · Values history: This document has questions about your views, beliefs, and how you feel and think about life  This information can help others choose the care that you would choose  Why are advance directives important? An advance directive helps you control your care  Although spoken wishes may be used, it is better to have your wishes written down  Spoken wishes can be misunderstood, or not followed  Treatments may be given even if you do not want them  An advance directive may make it easier for your family to make difficult choices about your care     Cigarette Smoking and Your Health   Risks to your health if you smoke:  Nicotine and other chemicals found in tobacco damage every cell in your body  Even if you are a light smoker, you have an increased risk for cancer, heart disease, and lung disease  If you are pregnant or have diabetes, smoking increases your risk for complications  Benefits to your health if you stop smoking:   · You decrease respiratory symptoms such as coughing, wheezing, and shortness of breath  · You reduce your risk for cancers of the lung, mouth, throat, kidney, bladder, pancreas, stomach, and cervix  If you already have cancer, you increase the benefits of chemotherapy  You also reduce your risk for cancer returning or a second cancer from developing  · You reduce your risk for heart disease, blood clots, heart attack, and stroke  · You reduce your risk for lung infections, and diseases such as pneumonia, asthma, chronic bronchitis, and emphysema  · Your circulation improves  More oxygen can be delivered to your body  If you have diabetes, you lower your risk for complications, such as kidney, artery, and eye diseases  You also lower your risk for nerve damage  Nerve damage can lead to amputations, poor vision, and blindness  · You improve your body's ability to heal and to fight infections  For more information and support to stop smoking:   · [employer]  Phone: 0- 007 - 318-5276  Web Address: www [employer]  Weight Management   Why it is important to manage your weight:  Being overweight increases your risk of health conditions such as heart disease, high blood pressure, type 2 diabetes, and certain types of cancer  It can also increase your risk for osteoarthritis, sleep apnea, and other respiratory problems  Aim for a slow, steady weight loss  Even a small amount of weight loss can lower your risk of health problems  How to lose weight safely:  A safe and healthy way to lose weight is to eat fewer calories and get regular exercise   You can lose up about 1 pound a week by decreasing the number of calories you eat by 500 calories each day  Healthy meal plan for weight management:  A healthy meal plan includes a variety of foods, contains fewer calories, and helps you stay healthy  A healthy meal plan includes the following:  · Eat whole-grain foods more often  A healthy meal plan should contain fiber  Fiber is the part of grains, fruits, and vegetables that is not broken down by your body  Whole-grain foods are healthy and provide extra fiber in your diet  Some examples of whole-grain foods are whole-wheat breads and pastas, oatmeal, brown rice, and bulgur  · Eat a variety of vegetables every day  Include dark, leafy greens such as spinach, kale, queenie greens, and mustard greens  Eat yellow and orange vegetables such as carrots, sweet potatoes, and winter squash  · Eat a variety of fruits every day  Choose fresh or canned fruit (canned in its own juice or light syrup) instead of juice  Fruit juice has very little or no fiber  · Eat low-fat dairy foods  Drink fat-free (skim) milk or 1% milk  Eat fat-free yogurt and low-fat cottage cheese  Try low-fat cheeses such as mozzarella and other reduced-fat cheeses  · Choose meat and other protein foods that are low in fat  Choose beans or other legumes such as split peas or lentils  Choose fish, skinless poultry (chicken or turkey), or lean cuts of red meat (beef or pork)  Before you cook meat or poultry, cut off any visible fat  · Use less fat and oil  Try baking foods instead of frying them  Add less fat, such as margarine, sour cream, regular salad dressing and mayonnaise to foods  Eat fewer high-fat foods  Some examples of high-fat foods include french fries, doughnuts, ice cream, and cakes  · Eat fewer sweets  Limit foods and drinks that are high in sugar  This includes candy, cookies, regular soda, and sweetened drinks  Exercise:  Exercise at least 30 minutes per day on most days of the week   Some examples of exercise include walking, biking, dancing, and swimming  You can also fit in more physical activity by taking the stairs instead of the elevator or parking farther away from stores  Ask your healthcare provider about the best exercise plan for you  Narcotic (Opioid) Safety    Use narcotics safely:  · Take prescribed narcotics exactly as directed  · Do not give narcotics to others or take narcotics that belong to someone else  · Do not mix narcotics without medicines or alcohol  · Do not drive or operate heavy machinery after you take the narcotic  · Monitor for side effects and notify your healthcare provider if you experienced side effects such as nausea, sleepiness, itching, or trouble thinking clearly  Manage constipation:    Constipation is the most common side effect of narcotic medicine  Constipation is when you have hard, dry bowel movements, or you go longer than usual between bowel movements  Tell your healthcare provider about all changes in your bowel movements while you are taking narcotics  He or she may recommend laxative medicine to help you have a bowel movement  He or she may also change the kind of narcotic you are taking, or change when you take it  The following are more ways you can prevent or relieve constipation:    · Drink liquids as directed  You may need to drink extra liquids to help soften and move your bowels  Ask how much liquid to drink each day and which liquids are best for you  · Eat high-fiber foods  This may help decrease constipation by adding bulk to your bowel movements  High-fiber foods include fruits, vegetables, whole-grain breads and cereals, and beans  Your healthcare provider or dietitian can help you create a high-fiber meal plan  Your provider may also recommend a fiber supplement if you cannot get enough fiber from food  · Exercise regularly  Regular physical activity can help stimulate your intestines   Walking is a good exercise to prevent or relieve constipation  Ask which exercises are best for you  · Schedule a time each day to have a bowel movement  This may help train your body to have regular bowel movements  Bend forward while you are on the toilet to help move the bowel movement out  Sit on the toilet for at least 10 minutes, even if you do not have a bowel movement  Store narcotics safely:   · Store narcotics where others cannot easily get them  Keep them in a locked cabinet or secure area  Do not  keep them in a purse or other bag you carry with you  A person may be looking for something else and find the narcotics  · Make sure narcotics are stored out of the reach of children  A child can easily overdose on narcotics  Narcotics may look like candy to a small child  The best way to dispose of narcotics: The laws vary by country and area  In the [place], the best way is to return the narcotics through a take-back program  This program is offered by the [employer])  The following are options for using the program:  · Take the narcotics to a CARLOS collection site  The site is often a law enforcement center  Call your local law enforcement center for scheduled take-back days in your area  You will be given information on where to go if the collection site is in a different location  · Take the narcotics to an approved pharmacy or hospital   A pharmacy or hospital may be set up as a collection site  You will need to ask if it is a CARLOS collection site if you were not directed there  A pharmacy or doctor's office may not be able to take back narcotics unless it is a CARLOS site  · Use a mail-back system  This means you are given containers to put the narcotics into  You will then mail them in the containers  · Use a take-back drop box  This is a place to leave the narcotics at any time  People and animals will not be able to get into the box   Your local law enforcement agency can tell you where to find a drop box in your area  Other ways to manage pain:   · Ask your healthcare provider about non-narcotic medicines to control pain  Nonprescription medicines include NSAIDs (such as ibuprofen) and acetaminophen  Prescription medicines include muscle relaxers, antidepressants, and steroids  · Pain may be managed without any medicines  Some ways to relieve pain include massage, aromatherapy, or meditation  Physical or occupational therapy may also help  For more information:   · Drug Enforcement Administration  [address] [facility] [address]  Phone: 1- 701 - 766-9055  Web Address: [facility]/drug_disposal/    · [address]  and Drug Administration  [facility] , [address] [facility]  Phone: 2- 634 - 142-9926  Web Address: http://[employer]/     © Copyright [employer] 2018 Information is for End User's use only and may not be sold, redistributed or otherwise used for commercial purposes   All illustrations and images included in CareNotes® are the copyrighted property of [employer] A M , Inc  or [address] [place] [address]

## 2023-01-09 DIAGNOSIS — G89.4 PAIN SYNDROME, CHRONIC: ICD-10-CM

## 2023-01-09 DIAGNOSIS — M54.16 LUMBAR RADICULOPATHY: ICD-10-CM

## 2023-01-09 RX ORDER — OXYCODONE HYDROCHLORIDE 5 MG/1
5 CAPSULE ORAL 2 TIMES DAILY PRN
Qty: 60 CAPSULE | Refills: 0 | Status: SHIPPED | OUTPATIENT
Start: 2023-01-09

## 2023-01-25 LAB
25(OH)D3 SERPL-MCNC: 19 NG/ML (ref 30–100)
ALBUMIN SERPL-MCNC: 3.5 G/DL (ref 3.6–5.1)
ALBUMIN/GLOB SERPL: 0.8 (CALC) (ref 1–2.5)
ALP SERPL-CCNC: 77 U/L (ref 37–153)
ALT SERPL-CCNC: 10 U/L (ref 6–29)
APPEARANCE UR: CLEAR
AST SERPL-CCNC: 12 U/L (ref 10–35)
BACTERIA UR QL AUTO: ABNORMAL /HPF
BASOPHILS # BLD AUTO: 63 CELLS/UL (ref 0–200)
BASOPHILS NFR BLD AUTO: 0.8 %
BILIRUB SERPL-MCNC: 0.2 MG/DL (ref 0.2–1.2)
BILIRUB UR QL STRIP: NEGATIVE
BUN SERPL-MCNC: 20 MG/DL (ref 7–25)
BUN/CREAT SERPL: 13 (CALC) (ref 6–22)
CALCIUM SERPL-MCNC: 9.7 MG/DL (ref 8.6–10.4)
CHLORIDE SERPL-SCNC: 108 MMOL/L (ref 98–110)
CHOLEST SERPL-MCNC: 203 MG/DL
CHOLEST/HDLC SERPL: 4.5 (CALC)
CO2 SERPL-SCNC: 29 MMOL/L (ref 20–32)
COLOR UR: YELLOW
CREAT SERPL-MCNC: 1.5 MG/DL (ref 0.5–1.05)
EOSINOPHIL # BLD AUTO: 221 CELLS/UL (ref 15–500)
EOSINOPHIL NFR BLD AUTO: 2.8 %
ERYTHROCYTE [DISTWIDTH] IN BLOOD BY AUTOMATED COUNT: 15.6 % (ref 11–15)
EST. AVERAGE GLUCOSE BLD GHB EST-MCNC: 111 MG/DL
EST. AVERAGE GLUCOSE BLD GHB EST-SCNC: 6.2 MMOL/L
GFR/BSA.PRED SERPLBLD CYS-BASED-ARV: 37 ML/MIN/1.73M2
GLOBULIN SER CALC-MCNC: 4.6 G/DL (CALC) (ref 1.9–3.7)
GLUCOSE SERPL-MCNC: 96 MG/DL (ref 65–139)
GLUCOSE UR QL STRIP: NEGATIVE
HBA1C MFR BLD: 5.5 % OF TOTAL HGB
HCT VFR BLD AUTO: 45.3 % (ref 35–45)
HDLC SERPL-MCNC: 45 MG/DL
HGB BLD-MCNC: 14.9 G/DL (ref 11.7–15.5)
HGB UR QL STRIP: NEGATIVE
HYALINE CASTS #/AREA URNS LPF: ABNORMAL /LPF
KETONES UR QL STRIP: ABNORMAL
LDLC SERPL CALC-MCNC: 130 MG/DL (CALC)
LEUKOCYTE ESTERASE UR QL STRIP: NEGATIVE
LYMPHOCYTES # BLD AUTO: 2141 CELLS/UL (ref 850–3900)
LYMPHOCYTES NFR BLD AUTO: 27.1 %
MCH RBC QN AUTO: 30 PG (ref 27–33)
MCHC RBC AUTO-ENTMCNC: 32.9 G/DL (ref 32–36)
MCV RBC AUTO: 91.3 FL (ref 80–100)
MONOCYTES # BLD AUTO: 687 CELLS/UL (ref 200–950)
MONOCYTES NFR BLD AUTO: 8.7 %
NEUTROPHILS # BLD AUTO: 4787 CELLS/UL (ref 1500–7800)
NEUTROPHILS NFR BLD AUTO: 60.6 %
NITRITE UR QL STRIP: NEGATIVE
NONHDLC SERPL-MCNC: 158 MG/DL (CALC)
PH UR STRIP: ABNORMAL [PH] (ref 5–8)
PLATELET # BLD AUTO: 289 THOUSAND/UL (ref 140–400)
PMV BLD REES-ECKER: 10.4 FL (ref 7.5–12.5)
POTASSIUM SERPL-SCNC: 4.6 MMOL/L (ref 3.5–5.3)
PROT SERPL-MCNC: 8.1 G/DL (ref 6.1–8.1)
PROT UR QL STRIP: ABNORMAL
RBC # BLD AUTO: 4.96 MILLION/UL (ref 3.8–5.1)
RBC #/AREA URNS HPF: ABNORMAL /HPF
SODIUM SERPL-SCNC: 143 MMOL/L (ref 135–146)
SP GR UR STRIP: 1.03 (ref 1–1.03)
SQUAMOUS #/AREA URNS HPF: ABNORMAL /HPF
TRIGL SERPL-MCNC: 160 MG/DL
TSH SERPL-ACNC: 2.75 MIU/L (ref 0.4–4.5)
WBC # BLD AUTO: 7.9 THOUSAND/UL (ref 3.8–10.8)
WBC #/AREA URNS HPF: ABNORMAL /HPF

## 2023-01-30 NOTE — TELEPHONE ENCOUNTER
"Patient presents to the clinic for low back pain.  Last administration of Tramadol was today around 0730.  Contract signed 10-21-22, and TOX pending.     FOOD SECURITY SCREENING QUESTIONS:    The next two questions are to help us understand your food security.  If you are feeling you need any assistance in this area, we have resources available to support you today.    Hunger Vital Signs:  Within the past 12 months we worried whether our food would run out before we got money to buy more. Never  Within the past 12 months the food we bought just didn't last and we didn't have money to get more. Never    Advance Care Directive on file? no  Advance Care Directive provided to patient? Declined-has legal documents at home.      Chief Complaint   Patient presents with     Musculoskeletal Problem       Initial /88 (BP Location: Right arm, Patient Position: Sitting, Cuff Size: Adult Regular)   Pulse 68   Temp (!) 96.5  F (35.8  C) (Tympanic)   Resp 16   Ht 1.664 m (5' 5.5\")   Wt 70.3 kg (155 lb)   SpO2 98%   BMI 25.40 kg/m   Estimated body mass index is 25.4 kg/m  as calculated from the following:    Height as of this encounter: 1.664 m (5' 5.5\").    Weight as of this encounter: 70.3 kg (155 lb).  Medication Reconciliation: complete        Charline Quarles LPN       " Patient notified

## 2023-02-08 DIAGNOSIS — M54.16 LUMBAR RADICULOPATHY: ICD-10-CM

## 2023-02-08 DIAGNOSIS — G89.4 PAIN SYNDROME, CHRONIC: ICD-10-CM

## 2023-02-09 RX ORDER — OXYCODONE HYDROCHLORIDE 5 MG/1
5 CAPSULE ORAL 2 TIMES DAILY PRN
Qty: 60 CAPSULE | Refills: 0 | Status: SHIPPED | OUTPATIENT
Start: 2023-02-09

## 2023-02-10 ENCOUNTER — RA CDI HCC (OUTPATIENT)
Dept: OTHER | Facility: HOSPITAL | Age: 68
End: 2023-02-10

## 2023-02-10 NOTE — PROGRESS NOTES
[facility]  coding opportunities       Chart reviewed, no opportunity found:   [address]        Patients Insurance     Medicare Insurance: [employer] Advantage

## 2023-02-16 ENCOUNTER — ANNUAL EXAM (OUTPATIENT)
Dept: FAMILY MEDICINE CLINIC | Facility: OTHER | Age: 68
End: 2023-02-16

## 2023-02-16 VITALS
HEART RATE: 77 BPM | WEIGHT: 165 LBS | RESPIRATION RATE: 18 BRPM | HEIGHT: 61 IN | TEMPERATURE: 97.6 F | DIASTOLIC BLOOD PRESSURE: 92 MMHG | OXYGEN SATURATION: 93 % | BODY MASS INDEX: 31.15 KG/M2 | SYSTOLIC BLOOD PRESSURE: 182 MMHG

## 2023-02-16 DIAGNOSIS — Z78.0 POSTMENOPAUSAL: ICD-10-CM

## 2023-02-16 DIAGNOSIS — L81.9 HYPERPIGMENTED SKIN LESION: ICD-10-CM

## 2023-02-16 DIAGNOSIS — Z01.419 ENCNTR FOR GYN EXAM (GENERAL) (ROUTINE) W/O ABN FINDINGS: Primary | ICD-10-CM

## 2023-02-16 NOTE — PROGRESS NOTES
Chiquis Martinez is a 69 y o  female who presents for gyn exam, patient is post menopausal and takes no hormone supplements  No vaginal spotting, or discharge noted  Symptoms of incontinence:  None     Current HRT: none  History of abnormal Pap smear: no  Family history of uterine or ovarian cancer: no  Regular self breast exam: yes  History of abnormal mammogram: no  Family history of breast cancer: no  History of abnormal lipids: yes - currently doing low fat diet  Recheck labs routine  Menstrual History:  OB History        5    Para   5    Term   2       3    AB        Living   2       SAB        IAB        Ectopic        Multiple        Live Births   3                Menarche age: early teenage years  No LMP recorded  Patient is postmenopausal        The following portions of the patient's history were reviewed and updated as appropriate:   She  has a past medical history of Anxiety, Depression, Fatty liver, Hyperlipidemia, Hypertension, Psychiatric disorder, and Varicella  She  has a past surgical history that includes Breast surgery (Bilateral);  section; Dilation and curettage of uterus; Cardiac surgery; and Ectopic pregnancy surgery    Current Outpatient Medications   Medication Sig Dispense Refill   • acetaminophen (TYLENOL) 650 mg CR tablet Take 1 tablet (650 mg total) by mouth every 8 (eight) hours as needed for moderate pain 30 tablet 0   • albuterol (PROVENTIL HFA,VENTOLIN HFA) 90 mcg/act inhaler Inhale 2 puffs every 4 (four) hours as needed for wheezing or shortness of breath 18 g 1   • amLODIPine (NORVASC) 10 mg tablet Take 1 tablet (10 mg total) by mouth daily 90 tablet 1   • aspirin (ECOTRIN LOW STRENGTH) 81 mg EC tablet Take 81 mg by mouth daily     • ASPIRIN-ACETAMINOPHEN-CAFFEINE PO Take by mouth     • busPIRone (BUSPAR) 30 MG tablet TAKE 1 TABLET BY MOUTH THREE TIMES A  tablet 0   • Calcium Carb-Cholecalciferol (OSCAL-D) 500 mg-200 units per tablet Take 1 tablet by mouth 2 (two) times a day with meals     • carvedilol (COREG) 25 mg tablet Take 1 tablet (25 mg total) by mouth 2 (two) times a day with meals 180 tablet 1   • diclofenac sodium (VOLTAREN) 1 % APPLY 2 GRAMS TO AFFECTED AREA 4 TIMES A  g 1   • famotidine (PEPCID) 20 mg tablet Take 1 tablet (20 mg total) by mouth 2 (two) times a day 180 tablet 1   • gabapentin (NEURONTIN) 600 MG tablet Take 1 tablet (600 mg total) by mouth 3 (three) times a day 270 tablet 1   • lisinopril (ZESTRIL) 5 mg tablet Take 1 tablet (5 mg total) by mouth daily 90 tablet 1   • methocarbamol (ROBAXIN) 500 mg tablet Take 1 tablet (500 mg total) by mouth daily at bedtime as needed for muscle spasms 30 tablet 1   • naloxone (NARCAN) 4 mg/0 1 mL nasal spray Administer 1 spray into a nostril  If no response after 2-3 minutes, give another dose in the other nostril using a new spray  1 each 1   • NON FORMULARY Hempvana roll on cream for pain     • oxyCODONE (OXY-IR) 5 MG capsule Take 1 capsule (5 mg total) by mouth 2 (two) times a day as needed for moderate pain Max Daily Amount: 10 mg 60 capsule 0   • sertraline (ZOLOFT) 50 mg tablet Take 1 tablet (50 mg total) by mouth daily 90 tablet 1   • diphenhydrAMINE (BENADRYL) 25 mg tablet Take 1 tablet (25 mg total) by mouth every 6 (six) hours as needed for itching for up to 5 days 20 tablet 0   • EPINEPHrine (EPIPEN) 0 3 mg/0 3 mL SOAJ Inject 0 3 mL (0 3 mg total) into a muscle once for 1 dose 0 6 mL 1     No current facility-administered medications for this visit  She is allergic to methyldopa       Review of Systems  Constitutional: negative for anorexia, chills, fatigue, fevers and weight loss  Respiratory: negative for cough, dyspnea on exertion and wheezing  Cardiovascular: negative for chest pain, chest pressure/discomfort, irregular heart beat and lower extremity edema  Gastrointestinal: negative for abdominal pain, diarrhea, nausea and vomiting  Genitourinary:negative for dysuria, hematuria and urinary incontinence  Integument/breast: negative for rash and skin color change  Musculoskeletal:positive for arthralgias, back pain, myalgias, neck pain and all chronic  Endocrine: negative for diabetic symptoms including blurry vision, polydipsia, polyphagia, polyuria and weight loss      Objective      BP (!) 182/92   Pulse 77   Temp 97 6 °F (36 4 °C)   Resp 18   Ht 5' 1" (1 549 m)   Wt 74 8 kg (165 lb)   SpO2 93%   BMI 31 18 kg/m²     General:   alert, appears stated age and cooperative   Heart: regular rate and rhythm, S1, S2 normal, no murmur, click, rub or gallop   Lungs: clear to auscultation bilaterally   Abdomen: soft, non-tender, without masses or organomegaly   Vulva: normal   Vagina: Thinning and atrophic lining noted  Cervix: no cervical motion tenderness and no lesions   Uterus: normal shape and consistency   Adnexa: no mass, fullness, tenderness       Breasts: breasts appear normal, no suspicious masses, no skin or nipple changes or axillary nodes, symmetric fibrous changes in both upper outer quadrants, there is well healed surgical scar from breast reduction bilaterally  Axilla with surgical scar on left side and no LAD either side  Pelvic exam: VULVA: normal appearing vulva with no masses, tenderness or lesions, VAGINA: atrophic, CERVIX: normal appearing cervix without discharge or lesions, cervical motion tenderness absent, UTERUS: uterus is normal size, shape, consistency and nontender, ADNEXA: normal adnexa in size, nontender and no masses, PAP: Pap smear done today  Assessment   Diagnoses and all orders for this visit:    Encntr for gyn exam (general) (routine) w/o abn findings  -     Liquid-based pap, screening    Postmenopausal    Hyperpigmented skin lesion  -     Ambulatory Referral to Dermatology; Future         Plan      All questions answered  Await pap smear results    Breast self exam technique reviewed and patient encouraged to perform self-exam monthly  Breast exam done today and unremarkable

## 2023-02-21 ENCOUNTER — OFFICE VISIT (OUTPATIENT)
Dept: FAMILY MEDICINE CLINIC | Facility: OTHER | Age: 68
End: 2023-02-21

## 2023-02-21 VITALS
HEART RATE: 71 BPM | SYSTOLIC BLOOD PRESSURE: 132 MMHG | DIASTOLIC BLOOD PRESSURE: 96 MMHG | WEIGHT: 175 LBS | OXYGEN SATURATION: 94 % | HEIGHT: 61 IN | RESPIRATION RATE: 18 BRPM | BODY MASS INDEX: 33.04 KG/M2 | TEMPERATURE: 97.8 F

## 2023-02-21 DIAGNOSIS — M54.16 LUMBAR RADICULOPATHY: ICD-10-CM

## 2023-02-21 DIAGNOSIS — G89.4 PAIN SYNDROME, CHRONIC: Primary | ICD-10-CM

## 2023-02-21 DIAGNOSIS — F11.20 CONTINUOUS OPIOID DEPENDENCE (HCC): ICD-10-CM

## 2023-02-21 NOTE — PATIENT INSTRUCTIONS

## 2023-02-21 NOTE — PROGRESS NOTES
Assessment/Plan     Problem List Items Addressed This Visit        Nervous and Auditory    Lumbar radiculopathy       Other    Pain syndrome, chronic - Primary   Other Visit Diagnoses     Continuous opioid dependence ([facility] [address] )        Relevant Orders    [facility] All Prescribed Meds and Special Instructions    Amphetamines, Methamphetamines    Butalbital    Phenobarbital    Secobarbital    Temazepam    Alprazolam    Clonazepam    Diazepam    Lorazepam    Oxazepam    Gabapentin    Pregabalin    Cocaine    Heroin    Buprenorphine    Levorphanol    Meperidine    Naltrexone    Fentanyl    Methadone    Oxycodone    Oxymorphone    Tapentadol    THC    Tramadol    Codeine, Hydrocodone, Hydropmorphone, Morphine    Bath Salts    Ethyl Glucuronide/Ethyl Sulfate    Kratom    Spice    Methylphenidate    Phentermine    Validity Oxidant    Validity Creatinine    Validity pH    Validity Specific         Treatment Plan: Continue oxycodone  Confirmed presence of Narcan in the home  Will obtain new opioid agreement contract  Overdue for UDS  F/U in 3 months for continued pain management    Treatment Goals: Improving functional capacity and independence    Opiate risks  There are risks associated with opioid medications, including dependence, addiction and tolerance  The patient understands and agrees to use these medications only as prescribed  Potential side effects of the medications include, but are not limited to, constipation, drowsiness, addiction, impaired judgment and risk of fatal overdose if not taken as prescribed  The patient was warned against driving while taking sedation medications  Sharing medications is a felony  At this point in time, the patient is showing no signs of addiction, abuse, diversion or suicidal ideation  Patient has a high risk condition (age > 72, IVONNE, renal or hepatic impairment, mental health condition, use of alcohol or other substances)   Has been counseled on the specific risks of taking opioids with these conditions and the increased risks including including sedation, increased fall risk, dizziness, addictive potential and death  Opioid agreement  Pain management agreement was reviewed with patient and signed/updated during visit      Drug screen  Drug screen collected during today's visit      PDMP review  [place] or [place]  reviewed  No red flags were identified; safe to proceed with prescription          Subjective     Opioid Management:   Type of visit: Follow-up    Pain related diagnoses: lumbar radiculopathy    Interval history: Patient continues to have intermittent episodes of spasms and sharp pain in the lower back and legs down to the knees bilaterally  Current pain regimen provides some relief to allow ability to perform daily living functions  Aberrant behavior?: No      Adverse effects from medication?: No      Screening Tools/Assessments:    PHQ-2/9:  Last PHQ-2 score: 2 (Last PHQ-2 date: 1/5/2023)  Last PHQ-9 score: 14 (Last PHQ-9 date: 8/30/2021)    Brief Pain Inventory (BPI):  1) Throughout our lives, most of us have had pain from time to time (such as minor headaches, sprains, and toothaches)  Have you had pain other than these everyday kinds of pain today? Yes  2) Where is your pain located? Lower back pain, Bilateral knee pain, Lower abdominal pain  3) Rate your pain at its worst in the last 24 hours: 8  4) Rate your pain at its least in the last 24 hours: 4  5) Rate your average level of pain: 7  6) Rate your pain right now: 6  7) What treatments or medications are you receiving for your pain?  Gabapentin, Oxycodone  8) In the past 24 hours, how much relief have pain treatments or medication provided? 70%  9) During the past 24 hours, pain has interfered with your:     A) General activity: 7     B) Mood: 3     C) Walking ability: 9     D) Normal work (work outside the home & housework): 9     E) Relations with other people: 3     F) Sleep: 9     G) Enjoyment of life: 9    Drug Screen:  Last drug screen was performed more than 365 days ago  Drug screen result based off current prescriptions: consistent    Opioid agreement:  Active Opioid agreement on file?: Yes    Opioid agreement signed date: 2/21/2023  Opioid agreement expiration date: 2/21/2024    Naloxone:  Currently prescribed Naloxone (Narcan): Yes      High risk medications  High risk meds taken in last 72 hours: oxycodone      Pain Medications             acetaminophen (TYLENOL) 650 mg CR tablet Take 1 tablet (650 mg total) by mouth every 8 (eight) hours as needed for moderate pain    aspirin (ECOTRIN LOW STRENGTH) 81 mg EC tablet Take 81 mg by mouth daily    ASPIRIN-ACETAMINOPHEN-CAFFEINE PO Take by mouth    gabapentin (NEURONTIN) 600 MG tablet Take 1 tablet (600 mg total) by mouth 3 (three) times a day    methocarbamol (ROBAXIN) 500 mg tablet Take 1 tablet (500 mg total) by mouth daily at bedtime as needed for muscle spasms    oxyCODONE (OXY-IR) 5 MG capsule Take 1 capsule (5 mg total) by mouth 2 (two) times a day as needed for moderate pain Max Daily Amount: 10 mg    sertraline (ZOLOFT) 50 mg tablet Take 1 tablet (50 mg total) by mouth daily         Outpatient Morphine Milligram Equivalents Per Day     2/22/23 and after 15 MME/Day    Order Name Dose Route Frequency Maximum MME/Day     oxyCODONE (OXY-IR) 5 MG capsule 5 mg Oral 2 times daily PRN 15 MME/Day    Total Potential Morphine Milligram Equivalents Per Day 15 MME/Day    Calculation Information        oxyCODONE (OXY-IR) 5 MG capsule    oxyCODONE 5 MG Caps: maximum daily dose of 10 mg * morphine equivalence factor of 1 5 = 15 MME/Day                         PDMP Review       Value Time User    PDMP Reviewed  Yes 2/9/2023  1:35 PM Timur Rosen MD         Review of Systems   Constitutional: Negative for appetite change, chills, fatigue, fever and unexpected weight change  HENT: Negative for congestion, ear pain, rhinorrhea and sore throat      Eyes: Negative for visual disturbance  Respiratory: Negative for cough, chest tightness and shortness of breath  Cardiovascular: Negative for chest pain, palpitations and leg swelling  Gastrointestinal: Negative for abdominal pain, blood in stool, constipation and diarrhea  Endocrine: Negative for cold intolerance, heat intolerance, polydipsia, polyphagia and polyuria  Genitourinary: Negative for dysuria, flank pain, menstrual problem and vaginal discharge  Musculoskeletal: Positive for arthralgias, back pain and myalgias  Negative for neck stiffness  Skin: Negative for rash  Allergic/Immunologic: Negative for environmental allergies  Neurological: Negative for dizziness, weakness and headaches  Hematological: Negative for adenopathy  Psychiatric/Behavioral: Negative for behavioral problems, decreased concentration and sleep disturbance  The patient is not nervous/anxious and is not hyperactive  Objective     /96   Pulse 71   Temp 97 8 °F (36 6 °C)   Resp 18   Ht 5' 1" (1 549 m)   Wt 79 4 kg (175 lb)   SpO2 94%   BMI 33 07 kg/m²     Physical Exam  Vitals and nursing note reviewed  Constitutional:       General: She is not in acute distress  Appearance: Normal appearance  She is well-developed  She is not ill-appearing  Comments: Body mass index is 33 07 kg/m²  HENT:      Head: Normocephalic and atraumatic  Eyes:      Extraocular Movements: Extraocular movements intact  Conjunctiva/sclera: Conjunctivae normal    Cardiovascular:      Rate and Rhythm: Normal rate and regular rhythm  Pulses: Normal pulses  Heart sounds: Normal heart sounds  No murmur heard  Pulmonary:      Effort: Pulmonary effort is normal  No respiratory distress  Breath sounds: Normal breath sounds  Musculoskeletal:      Cervical back: Normal range of motion and neck supple  Right lower leg: No edema  Left lower leg: No edema  Skin:     General: Skin is warm and dry  Neurological:      General: No focal deficit present  Mental Status: She is alert and oriented to person, place, and time     Psychiatric:         Behavior: Behavior normal          Lalo Butts MD

## 2023-02-27 LAB
6MAM UR QL CFM: NEGATIVE NG/ML
7AMINOCLONAZEPAM UR QL CFM: NEGATIVE NG/ML
A-OH ALPRAZ UR QL CFM: NEGATIVE NG/ML
ACCEPTABLE CREAT UR QL: NORMAL MG/DL
ACCEPTIBLE SP GR UR QL: NORMAL
AMPHET UR QL CFM: NEGATIVE NG/ML
BUPRENORPHINE UR QL CFM: NEGATIVE NG/ML
BUTALBITAL UR QL CFM: NEGATIVE NG/ML
BZE UR QL CFM: NEGATIVE NG/ML
CODEINE UR QL CFM: NEGATIVE NG/ML
EDDP UR QL CFM: NEGATIVE NG/ML
ETHYL GLUCURONIDE UR QL CFM: NEGATIVE NG/ML
ETHYL SULFATE UR QL SCN: NEGATIVE NG/ML
EUTYLONE UR QL: NEGATIVE NG/ML
FENTANYL UR QL CFM: NEGATIVE NG/ML
GLIADIN IGG SER IA-ACNC: NEGATIVE NG/ML
HYDROCODONE UR QL CFM: NEGATIVE NG/ML
HYDROMORPHONE UR QL CFM: NEGATIVE NG/ML
LORAZEPAM UR QL CFM: NEGATIVE NG/ML
ME-PHENIDATE UR QL CFM: NEGATIVE NG/ML
MEPERIDINE UR QL CFM: NEGATIVE NG/ML
METHADONE UR QL CFM: NEGATIVE NG/ML
METHAMPHET UR QL CFM: NEGATIVE NG/ML
MORPHINE UR QL CFM: NEGATIVE NG/ML
NALTREXONE UR QL CFM: NEGATIVE NG/ML
NITRITE UR QL: NORMAL UG/ML
NORBUPRENORPHINE UR QL CFM: NEGATIVE NG/ML
NORDIAZEPAM UR QL CFM: NEGATIVE NG/ML
NORFENTANYL UR QL CFM: NEGATIVE NG/ML
NORHYDROCODONE UR QL CFM: NEGATIVE NG/ML
NORMEPERIDINE UR QL CFM: NEGATIVE NG/ML
NOROXYCODONE UR QL CFM: NORMAL NG/ML
OXAZEPAM UR QL CFM: NEGATIVE NG/ML
OXYCODONE UR QL CFM: NORMAL NG/ML
OXYMORPHONE UR QL CFM: NORMAL NG/ML
OXYMORPHONE UR QL CFM: NORMAL NG/ML
PARA-FLUOROFENTANYL QUANTIFICATION: NORMAL NG/ML
PHENOBARB UR QL CFM: NEGATIVE NG/ML
RESULT ALL_PRESCRIBED MEDS AND SPECIAL INSTRUCTIONS: NORMAL
SECOBARBITAL UR QL CFM: NEGATIVE NG/ML
SL AMB 4-ANPP QUANTIFICATION: NORMAL NG/ML
SL AMB 5F-ADB-M7 METABOLITE QUANTIFICATION: NEGATIVE NG/ML
SL AMB 7-OH-MITRAGYNINE (KRATOM ALKALOID) QUANTIFICATION: NEGATIVE NG/ML
SL AMB AB-FUBINACA-M3 METABOLITE QUANTIFICATION: NEGATIVE NG/ML
SL AMB ACETYL FENTANYL QUANTIFICATION: NORMAL NG/ML
SL AMB ACETYL NORFENTANYL QUANTIFICATION: NORMAL NG/ML
SL AMB ACRYL FENTANYL QUANTIFICATION: NORMAL NG/ML
SL AMB CARFENTANIL QUANTIFICATION: NORMAL NG/ML
SL AMB CTHC (MARIJUANA METABOLITE) QUANTIFICATION: ABNORMAL NG/ML
SL AMB DEXTRORPHAN (DEXTROMETHORPHAN METABOLITE) QUANT: NEGATIVE NG/ML
SL AMB GABAPENTIN QUANTIFICATION: NORMAL
SL AMB JWH018 METABOLITE QUANTIFICATION: NEGATIVE NG/ML
SL AMB JWH073 METABOLITE QUANTIFICATION: NEGATIVE NG/ML
SL AMB MDMB-FUBINACA-M1 METABOLITE QUANTIFICATION: NEGATIVE NG/ML
SL AMB METHYLONE QUANTIFICATION: NORMAL NG/ML
SL AMB N-DESMETHYL-TRAMADOL QUANTIFICATION: NEGATIVE NG/ML
SL AMB PHENTERMINE QUANTIFICATION: NEGATIVE NG/ML
SL AMB PREGABALIN QUANTIFICATION: NEGATIVE
SL AMB RCS4 METABOLITE QUANTIFICATION: NEGATIVE NG/ML
SL AMB RITALINIC ACID QUANTIFICATION: NEGATIVE NG/ML
SMOOTH MUSCLE AB TITR SER IF: NEGATIVE NG/ML
SPECIMEN DRAWN SERPL: NEGATIVE NG/ML
SPECIMEN PH ACCEPTABLE UR: NORMAL
TAPENTADOL UR QL CFM: NEGATIVE NG/ML
TEMAZEPAM UR QL CFM: NEGATIVE NG/ML
TEMAZEPAM UR QL CFM: NEGATIVE NG/ML
TRAMADOL UR QL CFM: NEGATIVE NG/ML
URATE/CREAT 24H UR: NEGATIVE NG/ML

## 2023-03-02 LAB
LAB AP GYN PRIMARY INTERPRETATION: ABNORMAL
Lab: ABNORMAL
PATH INTERP SPEC-IMP: ABNORMAL

## 2023-03-09 DIAGNOSIS — M54.16 LUMBAR RADICULOPATHY: ICD-10-CM

## 2023-03-09 DIAGNOSIS — G89.4 PAIN SYNDROME, CHRONIC: ICD-10-CM

## 2023-03-09 RX ORDER — OXYCODONE HYDROCHLORIDE 5 MG/1
5 CAPSULE ORAL 2 TIMES DAILY PRN
Qty: 60 CAPSULE | Refills: 0 | Status: SHIPPED | OUTPATIENT
Start: 2023-03-09

## 2023-03-20 DIAGNOSIS — F30.9 BIPOLAR I DISORDER, SINGLE MANIC EPISODE (HCC): ICD-10-CM

## 2023-03-20 RX ORDER — BUSPIRONE HYDROCHLORIDE 30 MG/1
30 TABLET ORAL 3 TIMES DAILY
Qty: 270 TABLET | Refills: 0 | Status: SHIPPED | OUTPATIENT
Start: 2023-03-20 | End: 2023-06-18

## 2023-03-27 DIAGNOSIS — M51.36 DISC DEGENERATION, LUMBAR: ICD-10-CM

## 2023-03-27 DIAGNOSIS — M54.16 LUMBAR RADICULOPATHY: ICD-10-CM

## 2023-03-27 RX ORDER — GABAPENTIN 600 MG/1
600 TABLET ORAL 3 TIMES DAILY
Qty: 270 TABLET | Refills: 1 | Status: SHIPPED | OUTPATIENT
Start: 2023-03-27

## 2023-04-11 DIAGNOSIS — M54.16 LUMBAR RADICULOPATHY: ICD-10-CM

## 2023-04-11 DIAGNOSIS — G89.4 PAIN SYNDROME, CHRONIC: ICD-10-CM

## 2023-04-11 RX ORDER — OXYCODONE HYDROCHLORIDE 5 MG/1
5 CAPSULE ORAL 2 TIMES DAILY PRN
Qty: 60 CAPSULE | Refills: 0 | Status: SHIPPED | OUTPATIENT
Start: 2023-04-11 | End: 2023-05-10 | Stop reason: SDUPTHER

## 2023-04-17 NOTE — TELEPHONE ENCOUNTER
Additional information was sent into insurance for review   To see about MRI getting approved [FreeTextEntry1] : Reviewed with patient that x-rays were negative for acute ankle/foot fracture.\par Discussed with both patient and Mother that due to tenderness at the base of the 5th metatarsal that an occult fracture was still possible. We will treat this conservatively at this time with rest, ice, elevation, judicious use of OTC NSAIDs and a CAM walker boot. \par She will WBAT\par She will follow up with Dr. Donaldson for re-evaluation in 7-10 days\par Family is leaving for a trip to [place] in 14 days. \par Follow up precautions reviewed with patient in detail. Care plan was discussed and they expressed understanding. Patient had no additional questions or concerns at conclusion of appt. \par \par Seen under Supervision of Dr. Manuel

## 2023-05-10 DIAGNOSIS — G89.4 PAIN SYNDROME, CHRONIC: ICD-10-CM

## 2023-05-10 DIAGNOSIS — M54.16 LUMBAR RADICULOPATHY: ICD-10-CM

## 2023-05-10 RX ORDER — OXYCODONE HYDROCHLORIDE 5 MG/1
5 CAPSULE ORAL 2 TIMES DAILY PRN
Qty: 60 CAPSULE | Refills: 0 | Status: SHIPPED | OUTPATIENT
Start: 2023-05-10

## 2023-05-18 ENCOUNTER — OFFICE VISIT (OUTPATIENT)
Dept: FAMILY MEDICINE CLINIC | Facility: OTHER | Age: 68
End: 2023-05-18

## 2023-05-18 VITALS
DIASTOLIC BLOOD PRESSURE: 88 MMHG | WEIGHT: 161 LBS | HEART RATE: 57 BPM | OXYGEN SATURATION: 91 % | SYSTOLIC BLOOD PRESSURE: 132 MMHG | RESPIRATION RATE: 16 BRPM | TEMPERATURE: 98.4 F | BODY MASS INDEX: 30.4 KG/M2 | HEIGHT: 61 IN

## 2023-05-18 DIAGNOSIS — H92.02 EAR DISCOMFORT, LEFT: ICD-10-CM

## 2023-05-18 DIAGNOSIS — M79.18 MYOFASCIAL PAIN SYNDROME: ICD-10-CM

## 2023-05-18 DIAGNOSIS — H61.23 BILATERAL IMPACTED CERUMEN: ICD-10-CM

## 2023-05-18 DIAGNOSIS — M47.26 OSTEOARTHRITIS OF SPINE WITH RADICULOPATHY, LUMBAR REGION: Primary | ICD-10-CM

## 2023-05-18 NOTE — PROGRESS NOTES
Name: Essie       : 1955      MRN: [id number]  Encounter Provider: Rosalva Guzmán MD  Encounter Date: 2023   Encounter department: [address] [place] [address]      1  Osteoarthritis of spine with radiculopathy, lumbar region    2  Myofascial pain syndrome    3  Ear discomfort, left    4  Bilateral impacted cerumen    patient is doing well with the current therapy regimen for chronic pain which consist of oxycodone prn an Randall for nerve pain along with OTC pain killers and THC use regularly  Urine tox study confirms and consistent  Patient agreement was signed today  Patient is urged to use medication with precautions  Will follow up with neurosurgery for further treatment options  FU routine and every 3 months    For ear wax recommended ear lavage and patient to return in 2 weeks  For now recommend ear drops to soften the wax prior to procedure  Subjective      Rheumatoid Arthritis  Patient complains of degenerative arthritis of lumbar  Symptoms have been present for 30+ years and the back pain has been getting worse since  She started seeking medical help as of   No back surgeries done to date and has seen neurosurgery in the past for consult  Symptoms include joint pain, morning stiffness and lower extremity pain that is worse at night  She has numbness and tingling and cramps of lower extremity as well for which she takes Gabapentin regularly at 600 mg TID  Patient states the pain is constantly getting worse and she has been needing Oxycodone and takes THC supplement as well for sleep and arthritis  She states no side effects noted  Patient denies numbness of hands, skin nodules and weakness of hands  Symptoms are made worse by: cold exposure, movement, overuse, standing and walking  Symptoms are helped by heat, OTC pain medications BC powder,  Rx oxycodone and Gabapentin and THC   Patient denies associated bleeding/clotting problems, fevers, new headache and rashes/photosensitive  Overall disease activity:  ongoing  Limitation on activities include difficulty with walking and prolonged sitting or standing and bending  Patient is also planning to reach out to neurosurgery for further management options  Review of Systems   Constitutional: Negative for chills, fever and unexpected weight change  HENT: Negative for congestion  Respiratory: Negative for cough, shortness of breath and wheezing  Cardiovascular: Negative for chest pain and leg swelling  Gastrointestinal: Negative for abdominal pain and vomiting  Genitourinary: Negative for dysuria  Musculoskeletal: Positive for arthralgias, back pain and myalgias  Negative for neck stiffness  Neurological: Positive for numbness  Negative for dizziness and tremors  Psychiatric/Behavioral: Positive for sleep disturbance (improves with THC use )         Current Outpatient Medications on File Prior to Visit   Medication Sig   • acetaminophen (TYLENOL) 650 mg CR tablet Take 1 tablet (650 mg total) by mouth every 8 (eight) hours as needed for moderate pain   • albuterol (PROVENTIL HFA,VENTOLIN HFA) 90 mcg/act inhaler Inhale 2 puffs every 4 (four) hours as needed for wheezing or shortness of breath   • amLODIPine (NORVASC) 10 mg tablet Take 1 tablet (10 mg total) by mouth daily   • aspirin (ECOTRIN LOW STRENGTH) 81 mg EC tablet Take 81 mg by mouth daily   • ASPIRIN-ACETAMINOPHEN-CAFFEINE PO Take by mouth   • busPIRone (BUSPAR) 30 MG tablet Take 1 tablet (30 mg total) by mouth 3 (three) times a day   • Calcium Carb-Cholecalciferol (OSCAL-D) 500 mg-200 units per tablet Take 1 tablet by mouth 2 (two) times a day with meals   • carvedilol (COREG) 25 mg tablet Take 1 tablet (25 mg total) by mouth 2 (two) times a day with meals   • diclofenac sodium (VOLTAREN) 1 % APPLY 2 GRAMS TO AFFECTED AREA 4 TIMES A DAY   • EPINEPHrine (EPIPEN) 0 3 mg/0 3 mL SOAJ Inject 0 3 mL (0 3 mg total) into a "muscle once for 1 dose   • famotidine (PEPCID) 20 mg tablet Take 1 tablet (20 mg total) by mouth 2 (two) times a day   • gabapentin (NEURONTIN) 600 MG tablet Take 1 tablet (600 mg total) by mouth 3 (three) times a day   • lisinopril (ZESTRIL) 5 mg tablet Take 1 tablet (5 mg total) by mouth daily   • methocarbamol (ROBAXIN) 500 mg tablet Take 1 tablet (500 mg total) by mouth daily at bedtime as needed for muscle spasms   • naloxone (NARCAN) 4 mg/0 1 mL nasal spray Administer 1 spray into a nostril  If no response after 2-3 minutes, give another dose in the other nostril using a new spray  • NON FORMULARY Hempvana roll on cream for pain   • oxyCODONE (OXY-IR) 5 MG capsule Take 1 capsule (5 mg total) by mouth 2 (two) times a day as needed for moderate pain Max Daily Amount: 10 mg   • sertraline (ZOLOFT) 50 mg tablet Take 1 tablet (50 mg total) by mouth daily   • diphenhydrAMINE (BENADRYL) 25 mg tablet Take 1 tablet (25 mg total) by mouth every 6 (six) hours as needed for itching for up to 5 days       Objective     /88   Pulse 57   Temp 98 4 °F (36 9 °C)   Resp 16   Ht 5' 1\" (1 549 m)   Wt 73 kg (161 lb)   SpO2 91%   BMI 30 42 kg/m²     Physical Exam  Vitals and nursing note reviewed  Constitutional:       General: She is not in acute distress  Appearance: She is well-developed  She is not diaphoretic  HENT:      Head: Normocephalic and atraumatic  Eyes:      General: No scleral icterus  Conjunctiva/sclera: Conjunctivae normal    Cardiovascular:      Rate and Rhythm: Normal rate and regular rhythm  Heart sounds: Normal heart sounds  No murmur heard  Pulmonary:      Effort: Pulmonary effort is normal  No respiratory distress  Breath sounds: Normal breath sounds  No wheezing  Abdominal:      General: Bowel sounds are normal       Palpations: Abdomen is soft  Musculoskeletal:         General: Normal range of motion  Cervical back: Normal range of motion and neck supple   No " rigidity  Right lower leg: No edema  Left lower leg: No edema  Skin:     General: Skin is warm and dry  Coloration: Skin is not pale  Neurological:      General: No focal deficit present  Mental Status: She is alert and oriented to person, place, and time  Psychiatric:         Thought Content:  Thought content normal            Pérez Singh MD

## 2023-06-03 DIAGNOSIS — F31.9 BIPOLAR DEPRESSION (HCC): ICD-10-CM

## 2023-06-12 DIAGNOSIS — G89.4 PAIN SYNDROME, CHRONIC: ICD-10-CM

## 2023-06-12 DIAGNOSIS — M51.36 DISC DEGENERATION, LUMBAR: ICD-10-CM

## 2023-06-12 DIAGNOSIS — M54.16 LUMBAR RADICULOPATHY: ICD-10-CM

## 2023-06-12 RX ORDER — OXYCODONE HYDROCHLORIDE 5 MG/1
5 CAPSULE ORAL 2 TIMES DAILY PRN
Qty: 60 CAPSULE | Refills: 0 | Status: SHIPPED | OUTPATIENT
Start: 2023-06-12

## 2023-06-12 RX ORDER — GABAPENTIN 600 MG/1
600 TABLET ORAL 3 TIMES DAILY
Qty: 270 TABLET | Refills: 1 | Status: SHIPPED | OUTPATIENT
Start: 2023-06-12

## 2023-06-22 DIAGNOSIS — I10 ESSENTIAL HYPERTENSION: ICD-10-CM

## 2023-06-22 DIAGNOSIS — I10 BENIGN ESSENTIAL HYPERTENSION: ICD-10-CM

## 2023-06-22 DIAGNOSIS — F30.9 BIPOLAR I DISORDER, SINGLE MANIC EPISODE (HCC): ICD-10-CM

## 2023-06-22 RX ORDER — BUSPIRONE HYDROCHLORIDE 30 MG/1
30 TABLET ORAL 3 TIMES DAILY
Qty: 270 TABLET | Refills: 0 | Status: SHIPPED | OUTPATIENT
Start: 2023-06-22 | End: 2023-09-20

## 2023-06-22 RX ORDER — AMLODIPINE BESYLATE 10 MG/1
TABLET ORAL
Qty: 90 TABLET | Refills: 1 | Status: SHIPPED | OUTPATIENT
Start: 2023-06-22

## 2023-06-22 RX ORDER — LISINOPRIL 5 MG/1
5 TABLET ORAL DAILY
Qty: 90 TABLET | Refills: 1 | Status: SHIPPED | OUTPATIENT
Start: 2023-06-22

## 2023-06-24 ENCOUNTER — OFFICE VISIT (OUTPATIENT)
Dept: URGENT CARE | Facility: CLINIC | Age: 68
End: 2023-06-24
Payer: COMMERCIAL

## 2023-06-24 VITALS
RESPIRATION RATE: 18 BRPM | TEMPERATURE: 96.5 F | SYSTOLIC BLOOD PRESSURE: 131 MMHG | HEART RATE: 57 BPM | OXYGEN SATURATION: 95 % | DIASTOLIC BLOOD PRESSURE: 78 MMHG

## 2023-06-24 DIAGNOSIS — R07.89 CHEST TIGHTNESS: ICD-10-CM

## 2023-06-24 DIAGNOSIS — H61.23 BILATERAL IMPACTED CERUMEN: Primary | ICD-10-CM

## 2023-06-24 DIAGNOSIS — H60.502 ACUTE OTITIS EXTERNA OF LEFT EAR, UNSPECIFIED TYPE: ICD-10-CM

## 2023-06-24 DIAGNOSIS — R09.81 CHRONIC NASAL CONGESTION: ICD-10-CM

## 2023-06-24 PROCEDURE — 69209 REMOVE IMPACTED EAR WAX UNI: CPT | Performed by: PHYSICIAN ASSISTANT

## 2023-06-24 PROCEDURE — 69210 REMOVE IMPACTED EAR WAX UNI: CPT | Performed by: PHYSICIAN ASSISTANT

## 2023-06-24 PROCEDURE — S9083 URGENT CARE CENTER GLOBAL: HCPCS | Performed by: PHYSICIAN ASSISTANT

## 2023-06-24 PROCEDURE — 99213 OFFICE O/P EST LOW 20 MIN: CPT | Performed by: PHYSICIAN ASSISTANT

## 2023-06-24 RX ORDER — NEOMYCIN SULFATE, POLYMYXIN B SULFATE AND HYDROCORTISONE 10; 3.5; 1 MG/ML; MG/ML; [USP'U]/ML
4 SUSPENSION/ DROPS AURICULAR (OTIC) 3 TIMES DAILY
Qty: 10 ML | Refills: 0 | Status: SHIPPED | OUTPATIENT
Start: 2023-06-24

## 2023-06-24 RX ORDER — ALBUTEROL SULFATE 90 UG/1
2 AEROSOL, METERED RESPIRATORY (INHALATION) EVERY 6 HOURS PRN
Qty: 18 G | Refills: 0 | Status: SHIPPED | OUTPATIENT
Start: 2023-06-24

## 2023-06-24 RX ORDER — FLUTICASONE PROPIONATE 50 MCG
2 SPRAY, SUSPENSION (ML) NASAL DAILY
Qty: 16 G | Refills: 0 | Status: SHIPPED | OUTPATIENT
Start: 2023-06-24

## 2023-06-24 NOTE — PROGRESS NOTES
[email or web address]        NAME: Gerardo Garcia is a 69 y o  female  : 1955    MRN: [id number]  DATE: 2023  TIME: 11:58 AM    Assessment and Plan   Bilateral impacted cerumen [H61 23]  1  Bilateral impacted cerumen  Ear cerumen removal      2  Chronic nasal congestion  fluticasone (FLONASE) 50 mcg/act nasal spray      3  Chest tightness  albuterol (PROVENTIL HFA,VENTOLIN HFA) 90 mcg/act inhaler      4  Acute otitis externa of left ear, unspecified type  neomycin-polymyxin-hydrocortisone (CORTISPORIN) 0 35%-10,000 units/mL-1% otic suspension            Patient Instructions     Patient has bilateral cerumen impaction which was successfully removed today and patient tolerated procedure well without complication  I suspect her chronic nasal congestion and sore throat may be due to allergic rhinitis  I prescribed her Flonase and recommended an over-the-counter once daily 24-hour antihistamine  She was also given one-time refill of her rescue inhaler today  Upon completion of cerumen removal, it is noted that patient has some inflammation in her ear canal so I prescribed her Cortisporin otic to treat for otitis externa  Follow up with PCP in 3-5 days  Proceed to  ER if symptoms worsen  Chief Complaint     Chief Complaint   Patient presents with   • Cold Like Symptoms     Pt reports she has had sx for 2 months- She reports congestion at HS/left ear pain and sore throat  Daughter is asking for inhaler (no order since )Pt was supposed to have her ears flushed after noting wax in left ear however flush has not been done and according to pt it wont be until July  History of Present Illness       Patient presents with multiple complaints at today's visit  She recently was seen at her PCP office and told she had left ear cerumen impaction and was instructed to use drops and have her ear flushed out 2 weeks later which has not yet occurred  She reports left ear pain and is still using drops  She has chronic congestion and sore throat for 2 months but denies fever or chills  She also reports that she lost her albuterol rescue inhaler and would like a refill today  Review of Systems   Review of Systems   Constitutional: Negative  HENT: Positive for congestion, ear pain and sore throat  Respiratory: Negative  Cardiovascular: Negative  Gastrointestinal: Negative  Genitourinary: Negative            Current Medications       Current Outpatient Medications:   •  albuterol (PROVENTIL HFA,VENTOLIN HFA) 90 mcg/act inhaler, Inhale 2 puffs every 6 (six) hours as needed for wheezing or shortness of breath, Disp: 18 g, Rfl: 0  •  fluticasone (FLONASE) 50 mcg/act nasal spray, 2 sprays into each nostril daily, Disp: 16 g, Rfl: 0  •  neomycin-polymyxin-hydrocortisone (CORTISPORIN) 0 35%-10,000 units/mL-1% otic suspension, Administer 4 drops into the left ear 3 (three) times a day, Disp: 10 mL, Rfl: 0  •  acetaminophen (TYLENOL) 650 mg CR tablet, Take 1 tablet (650 mg total) by mouth every 8 (eight) hours as needed for moderate pain, Disp: 30 tablet, Rfl: 0  •  amLODIPine (NORVASC) 10 mg tablet, TAKE 1 TABLET BY MOUTH EVERY DAY, Disp: 90 tablet, Rfl: 1  •  aspirin (ECOTRIN LOW STRENGTH) 81 mg EC tablet, Take 81 mg by mouth daily, Disp: , Rfl:   •  ASPIRIN-ACETAMINOPHEN-CAFFEINE PO, Take by mouth, Disp: , Rfl:   •  busPIRone (BUSPAR) 30 MG tablet, Take 1 tablet (30 mg total) by mouth 3 (three) times a day, Disp: 270 tablet, Rfl: 0  •  Calcium Carb-Cholecalciferol (OSCAL-D) 500 mg-200 units per tablet, Take 1 tablet by mouth 2 (two) times a day with meals, Disp: , Rfl:   •  carbamide peroxide (DEBROX) 6 5 % otic solution, Administer 5 drops into both ears 2 (two) times a day, Disp: 15 mL, Rfl: 0  •  carvedilol (COREG) 25 mg tablet, Take 1 tablet (25 mg total) by mouth 2 (two) times a day with meals, Disp: 180 tablet, Rfl: 1  •  diclofenac sodium (VOLTAREN) 1 %, APPLY 2 GRAMS TO AFFECTED AREA 4 TIMES A DAY, Disp: 100 g, Rfl: 1  •  diphenhydrAMINE (BENADRYL) 25 mg tablet, Take 1 tablet (25 mg total) by mouth every 6 (six) hours as needed for itching for up to 5 days, Disp: 20 tablet, Rfl: 0  •  EPINEPHrine (EPIPEN) 0 3 mg/0 3 mL SOAJ, Inject 0 3 mL (0 3 mg total) into a muscle once for 1 dose, Disp: 0 6 mL, Rfl: 1  •  famotidine (PEPCID) 20 mg tablet, Take 1 tablet (20 mg total) by mouth 2 (two) times a day, Disp: 180 tablet, Rfl: 1  •  gabapentin (NEURONTIN) 600 MG tablet, Take 1 tablet (600 mg total) by mouth 3 (three) times a day, Disp: 270 tablet, Rfl: 1  •  lisinopril (ZESTRIL) 5 mg tablet, TAKE 1 TABLET (5 MG TOTAL) BY MOUTH DAILY  , Disp: 90 tablet, Rfl: 1  •  methocarbamol (ROBAXIN) 500 mg tablet, Take 1 tablet (500 mg total) by mouth daily at bedtime as needed for muscle spasms, Disp: 30 tablet, Rfl: 1  •  naloxone (NARCAN) 4 mg/0 1 mL nasal spray, Administer 1 spray into a nostril   If no response after 2-3 minutes, give another dose in the other nostril using a new spray , Disp: 1 each, Rfl: 1  •  NON FORMULARY, Hempvana roll on cream for pain, Disp: , Rfl:   •  oxyCODONE (OXY-IR) 5 MG capsule, Take 1 capsule (5 mg total) by mouth 2 (two) times a day as needed for moderate pain Max Daily Amount: 10 mg, Disp: 60 capsule, Rfl: 0  •  sertraline (ZOLOFT) 50 mg tablet, TAKE 1 TABLET BY MOUTH EVERY DAY, Disp: 90 tablet, Rfl: 1    Current Allergies     Allergies as of 06/24/2023 - Reviewed 06/24/2023   Allergen Reaction Noted   • Methyldopa Shortness Of Breath and Other (See Comments) 05/11/2012            The following portions of the patient's history were reviewed and updated as appropriate: allergies, current medications, past family history, past medical history, past social history, past surgical history and problem list      Past Medical History:   Diagnosis Date   • Anxiety    • Depression    • Fatty liver    • Hyperlipidemia    • Hypertension    • Psychiatric disorder    • Varicella        Past Surgical History:   Procedure Laterality Date   • BREAST SURGERY Bilateral     Reduction Procedure   • CARDIAC SURGERY     •  SECTION      x4   • DILATION AND CURETTAGE OF UTERUS     • ECTOPIC PREGNANCY SURGERY         Family History   Problem Relation Age of Onset   • Lung cancer Mother    • Cirrhosis Father    • Hypertension Sister    • Bipolar disorder Sister    • Heart disease Sister    • Diabetes Family    • Heart disease Family    • Hypertension Family    • Stroke Family    • Thyroid disease Family          Medications have been verified  Objective   /78   Pulse 57   Temp (!) 96 5 °F (35 8 °C)   Resp 18   SpO2 95%   No LMP recorded  Patient is postmenopausal        Physical Exam     Physical Exam  Vitals reviewed  Constitutional:       General: She is not in acute distress  Appearance: She is well-developed  HENT:      Right Ear: Hearing and external ear normal       Left Ear: Hearing and external ear normal       Ears:      Comments: Bilateral EACs with cerumen and inability to visualize TMs  Nose: Mucosal edema (B/L boggy turbinates) and congestion present  Mouth/Throat:      Mouth: Mucous membranes are moist       Pharynx: Posterior oropharyngeal erythema (PND) present  No oropharyngeal exudate  Tonsils: No tonsillar exudate  Cardiovascular:      Rate and Rhythm: Normal rate and regular rhythm  Pulses: Normal pulses  Heart sounds: Normal heart sounds  No murmur heard  Pulmonary:      Effort: Pulmonary effort is normal  No respiratory distress  Breath sounds: Normal breath sounds  Musculoskeletal:      Cervical back: Neck supple  Lymphadenopathy:      Cervical: No cervical adenopathy  Neurological:      Mental Status: She is alert and oriented to person, place, and time  Ear cerumen removal    Date/Time: 2023 8:15 AM    Performed by: Jaden Multani PA-C  Authorized by:  Jaden Multani PA-C  Universal Protocol:  Consent: Verbal consent obtained  Written consent not obtained  Risks and benefits: risks, benefits and alternatives were discussed  Consent given by: patient  Patient understanding: patient states understanding of the procedure being performed      Patient location:  Clinic  Indications / Diagnosis:  Bilateral cerumen impaction  Procedure details:     Local anesthetic:  None    Location:  L ear and R ear    Procedure type: irrigation with instrumentation      Instrumentation: curette and forceps      Approach:  External    Visualization (free text):  Otoscope  Post-procedure details:     Complication:  Macerated skin    Hearing quality:  Normal    Patient tolerance of procedure:   Tolerated well, no immediate complications

## 2023-06-24 NOTE — PATIENT INSTRUCTIONS
Earwax Blockage   AMBULATORY CARE:   Earwax  can build up in your ear canal and cause a blockage  Earwax blockage happens when your ear makes earwax faster than your body can remove it  Common symptoms include the following:   Trouble hearing    Earache    Ear fullness or a feeling that something is plugging up your ear    Itching or ringing in your ear    Dizziness    Seed immediate care if:   You feel dizzy  You have discharge or blood coming out of your ear  Your ear pain does not go away or gets worse  Call your doctor if:   You have a fever  You have trouble hearing or hear ringing noises  You have questions or concerns about your condition or care  Treatment for earwax blockage:   Medicines  placed in the ear canal can soften the earwax so it will come out  Flushing your ear canal  with warm water may flush out the earwax  Small medical tools  may be used to remove the earwax  How to prevent earwax blockage:  Do not stick anything into your ears to clean them  Use cotton swabs on the outside of your ear only  Ask your healthcare provider for more information on ways to prevent blockage  Follow up with your doctor as directed:  Write down your questions so you remember to ask them during your visits  © Copyright [employer] 2022 Information is for End User's use only and may not be sold, redistributed or otherwise used for commercial purposes  The above information is an  only  It is not intended as medical advice for individual conditions or treatments  Talk to your doctor, nurse or pharmacist before following any medical regimen to see if it is safe and effective for you  Allergic Rhinitis   WHAT YOU NEED TO KNOW:   Allergic rhinitis, or hay fever, is swelling of the inside of your nose  The swelling is a reaction to allergens in the air  An allergen can be anything that causes an allergic reaction   Allergies to weeds, grass, trees, or mold often cause seasonal allergic rhinitis  Indoor dust mites, cockroaches, pet dander, or mold can also cause allergic rhinitis  DISCHARGE INSTRUCTIONS:   Call 911 for the following: You have chest pain or shortness of breath  Return to the emergency department if:   You have severe pain  You cough up blood  Contact your healthcare provider if:   You have a fever  You have ear or sinus pain, or a headache  Your symptoms get worse, even after treatment  You have yellow, green, brown, or bloody mucus coming from your nose  Your nose is bleeding or you have pain inside your nose  You have trouble sleeping because of your symptoms  You have questions or concerns about your condition or care  Medicines:   Medicines  help decrease your symptoms and clear your stuffy nose  Take your medicine as directed  Contact your healthcare provider if you think your medicine is not helping or if you have side effects  Tell him of her if you are allergic to any medicine  Keep a list of the medicines, vitamins, and herbs you take  Include the amounts, and when and why you take them  Bring the list or the pill bottles to follow-up visits  Carry your medicine list with you in case of an emergency  How to manage allergic rhinitis:  The best way to manage allergic rhinitis is to avoid allergens that can trigger your symptoms  Any of the following may help decrease your symptoms:  Rinse your nose and sinuses  with a salt water solution or use a salt water nasal spray  This will help thin the mucus in your nose and rinse away pollen and dirt  It will also help reduce swelling so you can breathe normally  Ask your healthcare provider how often to rinse your nose  Reduce exposure to dust mites  Wash sheets and towels in hot water every week  Cover your pillows and mattresses with allergen-free covers  Limit the number of stuffed animals and soft toys your child has   Wash your child's toys in hot water regularly  Vacuum weekly and use a vacuum  with an air filter  If possible, get rid of carpets and curtains  These collect dust and dust mites  Reduce exposure to pollen  Keep windows and doors closed in your house and car  Stay inside when air pollution or the pollen count is high  Run your air conditioner on recycle, and change air filters often  Shower and wash your hair before bed every night to rinse away pollen  Reduce exposure to pet dander  If possible, do not keep cats, dogs, birds, or other pets  If you do keep pets in your home, keep them out of bedrooms and carpeted rooms  Bathe them often  Reduce exposure to mold  Do not spend time in basements  Choose artificial plants instead of live plants  Keep your home's humidity at less than 45%  Do not have ponds or standing water in your home or yard  Do not smoke  Avoid others who smoke  Ask your healthcare provider for information if you currently smoke and need help to quit  Follow up with your healthcare provider as directed: You may need to see an allergist often to control your symptoms  Write down your questions so you remember to ask them during your visits  © Copyright [employer] 2022 Information is for End User's use only and may not be sold, redistributed or otherwise used for commercial purposes  All illustrations and images included in CareNotes® are the copyrighted property of [employer]  or Kwame Garcia  The above information is an  only  It is not intended as medical advice for individual conditions or treatments  Talk to your doctor, nurse or pharmacist before following any medical regimen to see if it is safe and effective for you  Swimmer's Ear   WHAT YOU NEED TO KNOW:   Swimmer's ear, also called otitis externa, is an infection in the outer ear canal  This canal goes from the outside of your ear to your eardrum   Swimmer's ear most often occurs when water remains in your ear after you swim  This creates a moist area for bacteria to grow  Scratches or damage from your fingers, cotton swabs, or other objects can also cause swimmer's ear  DISCHARGE INSTRUCTIONS:   Return to the emergency department if:   You have severe ear pain  You are suddenly not able to hear  You have new swelling in your face, behind your ears, or in your neck  You suddenly cannot move part of your face, or it feels numb  Call your doctor if:   You have a fever  Your symptoms get worse or do not go away, even after treatment  You have questions or concerns about your condition or care  Treatment for swimmer's ear  may include cleaning your outer ear canal first  This will help clean any ear wax, flaky skin, or other discharge  You may then need any of the following:  Medicines:      Ear drops  help fight infection and decrease redness and swelling  Acetaminophen  decreases pain and fever  It is available without a doctor's order  Ask how much to take and how often to take it  Follow directions  Read the labels of all other medicines you are using to see if they also contain acetaminophen, or ask your doctor or pharmacist  Acetaminophen can cause liver damage if not taken correctly  NSAIDs , such as ibuprofen, help decrease swelling, pain, and fever  This medicine is available with or without a doctor's order  NSAIDs can cause stomach bleeding or kidney problems in certain people  If you take blood thinner medicine, always ask your healthcare provider if NSAIDs are safe for you  Always read the medicine label and follow directions  An ear wick  may be used if your ear canal is blocked  A wick (small tube) made of cotton or gauze is placed in your ear  The wick helps pull extra fluid out of your ear canal  Sue also help draw medicine into your ear canal     How to use ear drops:   Warm the bottle of ear drops in your hands  for a few minutes      Lie down on your side with your infected ear facing up  This will help the medicine travel completely through your ear canal     Gently pull the ear up and back  Carefully drip the correct number of ear drops into your ear  Have another person help you if possible  For children younger than 3 years,  gently pull and hold the ear down and back  For children older than 3 years,  gently pull and hold the ear up and back  Stay in the same position  for 3 to 5 minutes to let the medicine soak in  Prevent swimmer's ear:   Dry your ears completely after you swim or bathe  Gently wipe your outer ear with a soft towel or cloth  Use ear plugs when you swim  Do not put cotton swabs or other objects in your ears  These can scratch or damage your ear  They can also push ear wax deeper in and irritate your ear  Put cotton balls gently in your outer ear  when you apply hair spray, hair dye, or perfume  Follow up with your doctor as directed:  Write down your questions so you remember to ask them during your visits  © Copyright [employer] 2022 Information is for End User's use only and may not be sold, redistributed or otherwise used for commercial purposes  The above information is an  only  It is not intended as medical advice for individual conditions or treatments  Talk to your doctor, nurse or pharmacist before following any medical regimen to see if it is safe and effective for you

## 2023-07-06 DIAGNOSIS — I10 BENIGN ESSENTIAL HYPERTENSION: ICD-10-CM

## 2023-07-06 RX ORDER — CARVEDILOL 25 MG/1
TABLET ORAL
Qty: 180 TABLET | Refills: 1 | Status: SHIPPED | OUTPATIENT
Start: 2023-07-06

## 2023-07-10 DIAGNOSIS — M54.16 LUMBAR RADICULOPATHY: ICD-10-CM

## 2023-07-10 DIAGNOSIS — G89.4 PAIN SYNDROME, CHRONIC: ICD-10-CM

## 2023-07-10 RX ORDER — OXYCODONE HYDROCHLORIDE 5 MG/1
5 CAPSULE ORAL 2 TIMES DAILY PRN
Qty: 60 CAPSULE | Refills: 0 | Status: CANCELLED | OUTPATIENT
Start: 2023-07-10

## 2023-07-11 ENCOUNTER — OFFICE VISIT (OUTPATIENT)
Dept: FAMILY MEDICINE CLINIC | Facility: OTHER | Age: 68
End: 2023-07-11
Payer: COMMERCIAL

## 2023-07-11 VITALS
OXYGEN SATURATION: 93 % | WEIGHT: 158 LBS | BODY MASS INDEX: 29.83 KG/M2 | HEIGHT: 61 IN | RESPIRATION RATE: 15 BRPM | DIASTOLIC BLOOD PRESSURE: 92 MMHG | TEMPERATURE: 98.2 F | SYSTOLIC BLOOD PRESSURE: 186 MMHG | HEART RATE: 71 BPM

## 2023-07-11 DIAGNOSIS — J02.0 STREP PHARYNGITIS: ICD-10-CM

## 2023-07-11 DIAGNOSIS — M54.16 LUMBAR RADICULOPATHY: ICD-10-CM

## 2023-07-11 DIAGNOSIS — H66.90 OTITIS MEDIA, UNSPECIFIED LATERALITY, UNSPECIFIED OTITIS MEDIA TYPE: ICD-10-CM

## 2023-07-11 DIAGNOSIS — J32.9 SINUSITIS, UNSPECIFIED CHRONICITY, UNSPECIFIED LOCATION: ICD-10-CM

## 2023-07-11 DIAGNOSIS — J02.9 SORE THROAT: Primary | ICD-10-CM

## 2023-07-11 DIAGNOSIS — U07.1 COVID: ICD-10-CM

## 2023-07-11 DIAGNOSIS — R09.81 CHRONIC NASAL CONGESTION: ICD-10-CM

## 2023-07-11 DIAGNOSIS — G89.4 PAIN SYNDROME, CHRONIC: ICD-10-CM

## 2023-07-11 LAB
S PYO AG THROAT QL: POSITIVE
SARS-COV-2 AG UPPER RESP QL IA: POSITIVE
VALID CONTROL: ABNORMAL

## 2023-07-11 PROCEDURE — 87811 SARS-COV-2 COVID19 W/OPTIC: CPT

## 2023-07-11 PROCEDURE — 87880 STREP A ASSAY W/OPTIC: CPT

## 2023-07-11 PROCEDURE — 99213 OFFICE O/P EST LOW 20 MIN: CPT

## 2023-07-11 RX ORDER — LORATADINE 10 MG/1
10 TABLET ORAL DAILY
Qty: 60 TABLET | Refills: 1 | Status: SHIPPED | OUTPATIENT
Start: 2023-07-11

## 2023-07-11 RX ORDER — OXYCODONE HYDROCHLORIDE 5 MG/1
5 CAPSULE ORAL 2 TIMES DAILY PRN
Qty: 60 CAPSULE | Refills: 0 | Status: SHIPPED | OUTPATIENT
Start: 2023-07-11

## 2023-07-11 RX ORDER — AMOXICILLIN 875 MG/1
875 TABLET, COATED ORAL 2 TIMES DAILY
Qty: 20 TABLET | Refills: 0 | Status: SHIPPED | OUTPATIENT
Start: 2023-07-11 | End: 2023-07-21

## 2023-07-11 RX ORDER — NIRMATRELVIR AND RITONAVIR 150-100 MG
2 KIT ORAL 2 TIMES DAILY
Qty: 20 TABLET | Refills: 0 | Status: SHIPPED | OUTPATIENT
Start: 2023-07-11 | End: 2023-07-16

## 2023-07-11 RX ORDER — FLUTICASONE PROPIONATE 50 MCG
2 SPRAY, SUSPENSION (ML) NASAL DAILY
Qty: 16 G | Refills: 0 | Status: SHIPPED | OUTPATIENT
Start: 2023-07-11

## 2023-07-11 NOTE — PROGRESS NOTES
Name: Judith Jaeger      : 1955      MRN: [id number]  Encounter Provider: Zohreh Beavers MD  Encounter Date: 2023   Encounter department: [address]     1. Sore throat  -     POCT rapid strepA  -     POCT Rapid Covid Ag  -     amoxicillin (AMOXIL) 875 mg tablet; Take 1 tablet (875 mg total) by mouth 2 (two) times a day for 10 days    2. COVID  -     nirmatrelvir & ritonavir (Paxlovid, 150/100,) tablet therapy pack; Take 2 tablets by mouth 2 (two) times a day for 5 days Take 1 nirmatrelvir tablet + 1 ritonavir tablet together per dose    3. Chronic nasal congestion  -     fluticasone (FLONASE) 50 mcg/act nasal spray; 2 sprays into each nostril daily  -     loratadine (CLARITIN) 10 mg tablet; Take 1 tablet (10 mg total) by mouth daily    4. Strep pharyngitis  -     amoxicillin (AMOXIL) 875 mg tablet; Take 1 tablet (875 mg total) by mouth 2 (two) times a day for 10 days    5. Sinusitis, unspecified chronicity, unspecified location  -     loratadine (CLARITIN) 10 mg tablet; Take 1 tablet (10 mg total) by mouth daily    6. Otitis media, unspecified laterality, unspecified otitis media type  -     amoxicillin (AMOXIL) 875 mg tablet; Take 1 tablet (875 mg total) by mouth 2 (two) times a day for 10 days    7. Lumbar radiculopathy  -     oxyCODONE (OXY-IR) 5 MG capsule; Take 1 capsule (5 mg total) by mouth 2 (two) times a day as needed for moderate pain Max Daily Amount: 10 mg    8. Pain syndrome, chronic  -     oxyCODONE (OXY-IR) 5 MG capsule; Take 1 capsule (5 mg total) by mouth 2 (two) times a day as needed for moderate pain Max Daily Amount: 10 mg           Ms. Judith Jaeger is a 68 yo female with a PMH of HTN, CKD IIIa, bipolar I disorder, and chronic pain presenting with acute on chronic sore throat, congestion, and ear pain. Patient's vital signs today afebrile with elevated BP and no tachycardia.  Physical exam demonstrated erythematous oropharynx without appreciable exudate, L middle ear effusion, and decreased breath sounds bilaterally. POC testing in office positive for COVID-19 AND Strep A. Patient likely has developed acute infection on top of persistent allergic rhinitis. Plan:   - Amoxicillin 875mg BID x 10 days for group A strep pharyngitis   - Paxlovid BID x 5 days (dose adjusted for CKD)   - Continue claritin and flonase as prescribed   - Patient instructed to purchase spacer device at pharmacy to facilitate MDI use   - Refill patient's chronic pain medication     Patient given instructions to present to ED if she experiences acute worsening of symptoms despite beginning medication. Return in 1 week for follow up on symptoms. Subjective      HPI     Patient presents today for acute visit with concern of persistent sore throat. Patient was seen by urgent care on 6/24/23 for bilateral cerumen impaction, and 2 months of nasal congestion and sore throat. Cerumen disimpaction was performed and symptoms were attributed to chronic allergic rhinitis. She was prescribed flonase and daily OTC antihistamine. She was also prescribed cortisporin for otitis externa. Today, patient endorses the story above. She reports she started feeling even worse on Sunday. Her throat soreness has worsened, she is congested throughout her sinuses, and she has developed a cough productive of off-white/yellow sputum. She endorses fatigue, headaches, nausea, worsened acid reflux with epigastric pain, and diarrhea. She denies any hemoptysis, hematemesis, or hematochezia. She also endorses pain in her L ear. Patient denies any vomiting, fevers, or chills. She does not use q tips. She has a rescue inhaler but it's difficult for her to use it. She has been taking her pepcid BID and her claritin and flonase as prescribed. She recently ran out of the flonase and claritin.        Review of Systems   Constitutional: Positive for activity change, appetite change, chills and fatigue. HENT: Positive for congestion, ear pain, rhinorrhea, sinus pressure, sinus pain, sore throat and trouble swallowing. Eyes: Negative for pain, discharge, redness and itching. Respiratory: Positive for cough. Cardiovascular: Negative for chest pain and leg swelling. Gastrointestinal: Positive for abdominal pain, diarrhea and nausea. Endocrine: Negative for polydipsia. Genitourinary: Negative for difficulty urinating, dyspareunia and hematuria. Musculoskeletal: Positive for arthralgias and back pain. Skin: Negative for color change, rash and wound. Neurological: Positive for headaches. Negative for dizziness, speech difficulty and light-headedness. Psychiatric/Behavioral: Positive for agitation. The patient is not nervous/anxious.          Current Outpatient Medications on File Prior to Visit   Medication Sig   • acetaminophen (TYLENOL) 650 mg CR tablet Take 1 tablet (650 mg total) by mouth every 8 (eight) hours as needed for moderate pain   • albuterol (PROVENTIL HFA,VENTOLIN HFA) 90 mcg/act inhaler Inhale 2 puffs every 6 (six) hours as needed for wheezing or shortness of breath   • amLODIPine (NORVASC) 10 mg tablet TAKE 1 TABLET BY MOUTH EVERY DAY   • aspirin (ECOTRIN LOW STRENGTH) 81 mg EC tablet Take 81 mg by mouth daily   • ASPIRIN-ACETAMINOPHEN-CAFFEINE PO Take by mouth   • busPIRone (BUSPAR) 30 MG tablet Take 1 tablet (30 mg total) by mouth 3 (three) times a day   • Calcium Carb-Cholecalciferol (OSCAL-D) 500 mg-200 units per tablet Take 1 tablet by mouth 2 (two) times a day with meals   • carbamide peroxide (DEBROX) 6.5 % otic solution Administer 5 drops into both ears 2 (two) times a day   • carvedilol (COREG) 25 mg tablet TAKE 1 TABLET BY MOUTH TWICE A DAY WITH FOOD   • diclofenac sodium (VOLTAREN) 1 % APPLY 2 GRAMS TO AFFECTED AREA 4 TIMES A DAY   • EPINEPHrine (EPIPEN) 0.3 mg/0.3 mL SOAJ Inject 0.3 mL (0.3 mg total) into a muscle once for 1 dose   • famotidine (PEPCID) 20 mg tablet Take 1 tablet (20 mg total) by mouth 2 (two) times a day   • gabapentin (NEURONTIN) 600 MG tablet Take 1 tablet (600 mg total) by mouth 3 (three) times a day   • lisinopril (ZESTRIL) 5 mg tablet TAKE 1 TABLET (5 MG TOTAL) BY MOUTH DAILY. (Patient taking differently: Take 10 mg by mouth daily Taking 2 tablets)   • methocarbamol (ROBAXIN) 500 mg tablet Take 1 tablet (500 mg total) by mouth daily at bedtime as needed for muscle spasms   • naloxone (NARCAN) 4 mg/0.1 mL nasal spray Administer 1 spray into a nostril. If no response after 2-3 minutes, give another dose in the other nostril using a new spray. • neomycin-polymyxin-hydrocortisone (CORTISPORIN) 0.35%-10,000 units/mL-1% otic suspension Administer 4 drops into the left ear 3 (three) times a day   • NON FORMULARY Hempvana roll on cream for pain   • sertraline (ZOLOFT) 50 mg tablet TAKE 1 TABLET BY MOUTH EVERY DAY   • diphenhydrAMINE (BENADRYL) 25 mg tablet Take 1 tablet (25 mg total) by mouth every 6 (six) hours as needed for itching for up to 5 days       Objective     BP (!) 186/92   Pulse 71   Temp 98.2 °F (36.8 °C)   Resp 15   Ht 5' 1" (1.549 m)   Wt 71.7 kg (158 lb)   SpO2 93%   BMI 29.85 kg/m²     Physical Exam  Vitals and nursing note reviewed. Constitutional:       General: She is not in acute distress. Appearance: She is not diaphoretic. Comments: Patient appears fatigued   HENT:      Head: Normocephalic and atraumatic. Right Ear: External ear normal. No drainage. No middle ear effusion. Tympanic membrane is not erythematous. Left Ear: External ear normal. No drainage. A middle ear effusion is present. Tympanic membrane is not erythematous. Nose: Congestion and rhinorrhea present. Mouth/Throat:      Mouth: Mucous membranes are moist.      Pharynx: Uvula midline. Pharyngeal swelling and posterior oropharyngeal erythema present. Tonsils: No tonsillar exudate or tonsillar abscesses.    Eyes:      General: No scleral icterus. Right eye: No discharge. Left eye: No discharge. Conjunctiva/sclera: Conjunctivae normal.   Cardiovascular:      Rate and Rhythm: Normal rate and regular rhythm. Heart sounds: Normal heart sounds. No murmur heard. No friction rub. No gallop. Pulmonary:      Effort: No tachypnea, accessory muscle usage, prolonged expiration, respiratory distress or retractions. Breath sounds: Decreased air movement present. Decreased breath sounds present. No wheezing, rhonchi or rales. Abdominal:      General: Bowel sounds are normal. There is no distension. Palpations: Abdomen is soft. Tenderness: There is no abdominal tenderness. There is no guarding or rebound. Musculoskeletal:         General: No swelling or deformity. Cervical back: Neck supple. Right lower leg: No edema. Left lower leg: No edema. Skin:     General: Skin is warm and dry. Capillary Refill: Capillary refill takes less than 2 seconds. Coloration: Skin is not jaundiced or pale. Findings: No bruising, erythema, lesion or rash. Neurological:      Mental Status: She is alert. Motor: No weakness. Psychiatric:         Attention and Perception: Attention normal.         Speech: Speech normal.         Behavior: Behavior normal. Behavior is cooperative. Thought Content: Thought content normal.          Labs:     Component      Latest Ref Rng 7/11/2023   RAPID STREP A      Negative  Positive ! Component      Latest Ref Rng 7/11/2023   SARS-CoV-2 Ag      Negative  Positive !     Nataliia Ren MD   [facility] PGY-2

## 2023-07-11 NOTE — PATIENT INSTRUCTIONS
Please got to ED if you start to feel worse. Please ask your pharmacist for SPACER for your inhaler.

## 2023-07-27 ENCOUNTER — TELEPHONE (OUTPATIENT)
Dept: FAMILY MEDICINE CLINIC | Facility: OTHER | Age: 68
End: 2023-07-27

## 2023-08-03 DIAGNOSIS — R09.81 CHRONIC NASAL CONGESTION: ICD-10-CM

## 2023-08-03 RX ORDER — FLUTICASONE PROPIONATE 50 MCG
SPRAY, SUSPENSION (ML) NASAL
Qty: 48 ML | Refills: 1 | Status: SHIPPED | OUTPATIENT
Start: 2023-08-03

## 2023-08-09 DIAGNOSIS — M54.16 LUMBAR RADICULOPATHY: ICD-10-CM

## 2023-08-09 DIAGNOSIS — G89.4 PAIN SYNDROME, CHRONIC: ICD-10-CM

## 2023-08-09 RX ORDER — OXYCODONE HYDROCHLORIDE 5 MG/1
5 CAPSULE ORAL 2 TIMES DAILY PRN
Qty: 60 CAPSULE | Refills: 0 | Status: SHIPPED | OUTPATIENT
Start: 2023-08-09

## 2023-08-11 ENCOUNTER — OFFICE VISIT (OUTPATIENT)
Dept: FAMILY MEDICINE CLINIC | Facility: OTHER | Age: 68
End: 2023-08-11
Payer: COMMERCIAL

## 2023-08-11 VITALS
DIASTOLIC BLOOD PRESSURE: 100 MMHG | BODY MASS INDEX: 31.15 KG/M2 | WEIGHT: 165 LBS | HEIGHT: 61 IN | TEMPERATURE: 98.4 F | SYSTOLIC BLOOD PRESSURE: 154 MMHG | OXYGEN SATURATION: 90 % | HEART RATE: 79 BPM | RESPIRATION RATE: 17 BRPM

## 2023-08-11 DIAGNOSIS — R05.8 UPPER AIRWAY COUGH SYNDROME: Primary | ICD-10-CM

## 2023-08-11 DIAGNOSIS — J02.9 SORE THROAT: ICD-10-CM

## 2023-08-11 LAB — S PYO AG THROAT QL: NEGATIVE

## 2023-08-11 PROCEDURE — 87880 STREP A ASSAY W/OPTIC: CPT | Performed by: FAMILY MEDICINE

## 2023-08-11 PROCEDURE — 99213 OFFICE O/P EST LOW 20 MIN: CPT | Performed by: FAMILY MEDICINE

## 2023-08-11 RX ORDER — PREDNISONE 50 MG/1
50 TABLET ORAL DAILY
Qty: 3 TABLET | Refills: 0 | Status: SHIPPED | OUTPATIENT
Start: 2023-08-11 | End: 2023-08-14

## 2023-08-11 NOTE — PROGRESS NOTES
Assessment/Plan:    Upper airway cough syndrome  - Patient reports ongoing congestion, PND, sore throat, SOB, dry cough, left ear tinnitus for the last month  - Finished course of Paxlovid and Amoxacillin for COVID and strep throat from office visit 7/11 without complete resolution  - Patient taking Claritin, Mucinex, Flonase daily without resolution  - Denies any fever, chills, N/V, chest pain, lightheadedness, dizziness  - Physical examination notable for swelling and erythema of the turbinates, oropharyngeal swelling, cobble stoning, red/inflammed tongue   - In office rapid strep test negative   - Lungs CTA b/l   - Suspect symptoms 2/2 to ongoing post viral inflammation prolonged 2/2 to smoking     Plan  - Prednisone 50 mg x3 days  - Humidifier in room at night  - Continue OTC treatment with Mucinex, Flonase, Claritin  - Warm tea with honey  - Cepacol or Chloraseptic lozenges for cough  - F/u in 1 week and if symptoms ongoing will f/u with CXR        Diagnoses and all orders for this visit:    Upper airway cough syndrome    Sore throat  -     POCT rapid strepA  -     predniSONE 50 mg tablet; Take 1 tablet (50 mg total) by mouth daily for 3 days          Subjective:      Patient ID: Claire Ray is a 69 y.o. female. Claire Ray is a 69 yoF presenting with the CC of ongoing congestion, PND, sore throat, SOB, dry cough, left ear tinnitus for the last month. Patient having initially presented to clinic 7/11/23 where she was diagnosed with COVID and Strep throat. Patient having since completed course of Paxlovid and Amoxacillin without complete resolution of symptoms. She reports taking Claritin, Mucinex, Flonase daily without symptom relief. She continues to smoke daily. She otherwise denies any fever, chills, N/V, chest pain, lightheadedness, dizziness.        The following portions of the patient's history were reviewed and updated as appropriate: allergies, current medications, past family history, past medical history, past social history, past surgical history and problem list.    Review of Systems   Constitutional: Positive for appetite change and fatigue. Negative for chills and fever. HENT: Positive for congestion, ear pain, postnasal drip, tinnitus (left ear) and trouble swallowing. Negative for ear discharge, rhinorrhea, sinus pressure and sinus pain. Eyes: Negative for visual disturbance. Respiratory: Positive for cough (slight) and shortness of breath. Negative for wheezing. Cardiovascular: Negative for chest pain and palpitations. Gastrointestinal: Positive for diarrhea, nausea and vomiting. Negative for constipation. Endocrine: Negative for polyuria. Genitourinary: Negative for dysuria. Musculoskeletal: Negative for arthralgias and myalgias. Skin: Negative for color change and rash. Allergic/Immunologic: Negative for environmental allergies. Neurological: Positive for weakness. Negative for dizziness, light-headedness and headaches. Hematological: Negative for adenopathy. Objective:      /100   Pulse 79   Temp 98.4 °F (36.9 °C)   Resp 17   Ht 5' 1" (1.549 m)   Wt 74.8 kg (165 lb)   SpO2 90%   BMI 31.18 kg/m²          Physical Exam  Constitutional:       General: She is not in acute distress. Appearance: Normal appearance. She is ill-appearing. HENT:      Head: Normocephalic and atraumatic. Right Ear: External ear normal.      Left Ear: External ear normal.      Nose: Congestion and rhinorrhea present. Mouth/Throat:      Mouth: Mucous membranes are moist.      Pharynx: Posterior oropharyngeal erythema present. Eyes:      General:         Right eye: No discharge. Left eye: No discharge. Extraocular Movements: Extraocular movements intact. Conjunctiva/sclera: Conjunctivae normal.   Cardiovascular:      Rate and Rhythm: Normal rate and regular rhythm. Pulses: Normal pulses. Heart sounds: Normal heart sounds.    Pulmonary: Effort: Pulmonary effort is normal.      Breath sounds: Normal breath sounds. No wheezing, rhonchi or rales. Abdominal:      General: Abdomen is flat. There is no distension. Palpations: Abdomen is soft. There is no mass. Tenderness: There is no abdominal tenderness. There is no guarding. Musculoskeletal:      Cervical back: Neck supple. Tenderness present. Right lower leg: No edema. Left lower leg: No edema. Lymphadenopathy:      Cervical: No cervical adenopathy. Skin:     General: Skin is warm and dry. Neurological:      Mental Status: She is alert. Motor: No weakness.       Gait: Gait normal.

## 2023-08-13 ENCOUNTER — RA CDI HCC (OUTPATIENT)
Dept: OTHER | Facility: HOSPITAL | Age: 68
End: 2023-08-13

## 2023-08-13 NOTE — PROGRESS NOTES
[address] [facility] coding opportunities       Chart reviewed, no opportunity found: [id number]        Patients Insurance     Medicare Insurance: [employer]

## 2023-08-25 ENCOUNTER — APPOINTMENT (EMERGENCY)
Dept: CT IMAGING | Facility: HOSPITAL | Age: 68
DRG: 871 | End: 2023-08-25
Payer: COMMERCIAL

## 2023-08-25 ENCOUNTER — APPOINTMENT (EMERGENCY)
Dept: RADIOLOGY | Facility: HOSPITAL | Age: 68
DRG: 871 | End: 2023-08-25
Payer: COMMERCIAL

## 2023-08-25 ENCOUNTER — HOSPITAL ENCOUNTER (INPATIENT)
Facility: HOSPITAL | Age: 68
LOS: 5 days | Discharge: HOME HEALTH | DRG: 871 | End: 2023-08-30
Attending: EMERGENCY MEDICINE | Admitting: INTERNAL MEDICINE
Payer: COMMERCIAL

## 2023-08-25 DIAGNOSIS — I27.20 PULMONARY HYPERTENSION (HCC): ICD-10-CM

## 2023-08-25 DIAGNOSIS — J96.01 ACUTE RESPIRATORY FAILURE WITH HYPOXIA (HCC): ICD-10-CM

## 2023-08-25 DIAGNOSIS — T17.500A MUCUS PLUGGING OF BRONCHI: Primary | ICD-10-CM

## 2023-08-25 DIAGNOSIS — I50.9 CHF (CONGESTIVE HEART FAILURE) (HCC): ICD-10-CM

## 2023-08-25 DIAGNOSIS — R59.0 LYMPHADENOPATHY, HILAR: ICD-10-CM

## 2023-08-25 DIAGNOSIS — J43.9 EMPHYSEMA OF LUNG (HCC): ICD-10-CM

## 2023-08-25 DIAGNOSIS — I10 BENIGN ESSENTIAL HYPERTENSION: ICD-10-CM

## 2023-08-25 DIAGNOSIS — I26.99 PULMONARY EMBOLISM (HCC): ICD-10-CM

## 2023-08-25 DIAGNOSIS — J18.9 PNEUMONIA: ICD-10-CM

## 2023-08-25 DIAGNOSIS — R26.2 AMBULATORY DYSFUNCTION: ICD-10-CM

## 2023-08-25 DIAGNOSIS — N89.8 VAGINAL ITCHING: ICD-10-CM

## 2023-08-25 PROBLEM — A41.9 SEPSIS (HCC): Status: ACTIVE | Noted: 2023-08-25

## 2023-08-25 PROBLEM — I50.30 (HFPEF) HEART FAILURE WITH PRESERVED EJECTION FRACTION (HCC): Status: ACTIVE | Noted: 2023-08-25

## 2023-08-25 PROBLEM — R06.02 SOB (SHORTNESS OF BREATH): Status: ACTIVE | Noted: 2023-08-25

## 2023-08-25 LAB
2HR DELTA HS TROPONIN: -13 NG/L
4HR DELTA HS TROPONIN: 3 NG/L
ALBUMIN SERPL BCP-MCNC: 3.6 G/DL (ref 3.5–5)
ALP SERPL-CCNC: 78 U/L (ref 34–104)
ALT SERPL W P-5'-P-CCNC: 6 U/L (ref 7–52)
ANION GAP SERPL CALCULATED.3IONS-SCNC: 8 MMOL/L
APTT PPP: 28 SECONDS (ref 23–37)
AST SERPL W P-5'-P-CCNC: 14 U/L (ref 13–39)
BASOPHILS # BLD AUTO: 0.09 THOUSANDS/ÂΜL (ref 0–0.1)
BASOPHILS NFR BLD AUTO: 1 % (ref 0–1)
BILIRUB SERPL-MCNC: 0.31 MG/DL (ref 0.2–1)
BNP SERPL-MCNC: 272 PG/ML (ref 0–100)
BUN SERPL-MCNC: 15 MG/DL (ref 5–25)
CALCIUM SERPL-MCNC: 9.9 MG/DL (ref 8.4–10.2)
CARDIAC TROPONIN I PNL SERPL HS: 60 NG/L
CARDIAC TROPONIN I PNL SERPL HS: 73 NG/L
CARDIAC TROPONIN I PNL SERPL HS: 76 NG/L
CHLORIDE SERPL-SCNC: 107 MMOL/L (ref 96–108)
CO2 SERPL-SCNC: 24 MMOL/L (ref 21–32)
CREAT SERPL-MCNC: 1.01 MG/DL (ref 0.6–1.3)
D DIMER PPP FEU-MCNC: 0.99 UG/ML FEU
EOSINOPHIL # BLD AUTO: 0.18 THOUSAND/ÂΜL (ref 0–0.61)
EOSINOPHIL NFR BLD AUTO: 1 % (ref 0–6)
ERYTHROCYTE [DISTWIDTH] IN BLOOD BY AUTOMATED COUNT: 15.6 % (ref 11.6–15.1)
FLUAV RNA RESP QL NAA+PROBE: NEGATIVE
FLUBV RNA RESP QL NAA+PROBE: NEGATIVE
GFR SERPL CREATININE-BSD FRML MDRD: 56 ML/MIN/1.73SQ M
GLUCOSE SERPL-MCNC: 131 MG/DL (ref 65–140)
HCT VFR BLD AUTO: 47.1 % (ref 34.8–46.1)
HGB BLD-MCNC: 15.2 G/DL (ref 11.5–15.4)
IMM GRANULOCYTES # BLD AUTO: 0.09 THOUSAND/UL (ref 0–0.2)
IMM GRANULOCYTES NFR BLD AUTO: 1 % (ref 0–2)
INR PPP: 0.97 (ref 0.84–1.19)
L PNEUMO1 AG UR QL IA.RAPID: NEGATIVE
LACTATE SERPL-SCNC: 0.6 MMOL/L (ref 0.5–2)
LYMPHOCYTES # BLD AUTO: 1 THOUSANDS/ÂΜL (ref 0.6–4.47)
LYMPHOCYTES NFR BLD AUTO: 6 % (ref 14–44)
MCH RBC QN AUTO: 31.1 PG (ref 26.8–34.3)
MCHC RBC AUTO-ENTMCNC: 32.3 G/DL (ref 31.4–37.4)
MCV RBC AUTO: 96 FL (ref 82–98)
MONOCYTES # BLD AUTO: 1.03 THOUSAND/ÂΜL (ref 0.17–1.22)
MONOCYTES NFR BLD AUTO: 6 % (ref 4–12)
NEUTROPHILS # BLD AUTO: 15.53 THOUSANDS/ÂΜL (ref 1.85–7.62)
NEUTS SEG NFR BLD AUTO: 85 % (ref 43–75)
NRBC BLD AUTO-RTO: 0 /100 WBCS
PLATELET # BLD AUTO: 291 THOUSANDS/UL (ref 149–390)
PMV BLD AUTO: 9.6 FL (ref 8.9–12.7)
POTASSIUM SERPL-SCNC: 4 MMOL/L (ref 3.5–5.3)
PROCALCITONIN SERPL-MCNC: 0.17 NG/ML
PROT SERPL-MCNC: 8.5 G/DL (ref 6.4–8.4)
PROTHROMBIN TIME: 13.5 SECONDS (ref 11.6–14.5)
RBC # BLD AUTO: 4.89 MILLION/UL (ref 3.81–5.12)
RSV RNA RESP QL NAA+PROBE: NEGATIVE
S PNEUM AG UR QL: NEGATIVE
SARS-COV-2 RNA RESP QL NAA+PROBE: NEGATIVE
SODIUM SERPL-SCNC: 139 MMOL/L (ref 135–147)
WBC # BLD AUTO: 17.92 THOUSAND/UL (ref 4.31–10.16)

## 2023-08-25 PROCEDURE — 87205 SMEAR GRAM STAIN: CPT

## 2023-08-25 PROCEDURE — 87070 CULTURE OTHR SPECIMN AEROBIC: CPT

## 2023-08-25 PROCEDURE — 0241U HB NFCT DS VIR RESP RNA 4 TRGT: CPT

## 2023-08-25 PROCEDURE — 80053 COMPREHEN METABOLIC PANEL: CPT

## 2023-08-25 PROCEDURE — 71275 CT ANGIOGRAPHY CHEST: CPT

## 2023-08-25 PROCEDURE — 85025 COMPLETE CBC W/AUTO DIFF WBC: CPT

## 2023-08-25 PROCEDURE — 85610 PROTHROMBIN TIME: CPT | Performed by: EMERGENCY MEDICINE

## 2023-08-25 PROCEDURE — 99285 EMERGENCY DEPT VISIT HI MDM: CPT

## 2023-08-25 PROCEDURE — 84145 PROCALCITONIN (PCT): CPT

## 2023-08-25 PROCEDURE — 87106 FUNGI IDENTIFICATION YEAST: CPT

## 2023-08-25 PROCEDURE — 94664 DEMO&/EVAL PT USE INHALER: CPT

## 2023-08-25 PROCEDURE — 94640 AIRWAY INHALATION TREATMENT: CPT

## 2023-08-25 PROCEDURE — 84484 ASSAY OF TROPONIN QUANT: CPT

## 2023-08-25 PROCEDURE — 87040 BLOOD CULTURE FOR BACTERIA: CPT

## 2023-08-25 PROCEDURE — 85379 FIBRIN DEGRADATION QUANT: CPT

## 2023-08-25 PROCEDURE — 96375 TX/PRO/DX INJ NEW DRUG ADDON: CPT

## 2023-08-25 PROCEDURE — 83605 ASSAY OF LACTIC ACID: CPT

## 2023-08-25 PROCEDURE — 99223 1ST HOSP IP/OBS HIGH 75: CPT | Performed by: INTERNAL MEDICINE

## 2023-08-25 PROCEDURE — 85730 THROMBOPLASTIN TIME PARTIAL: CPT | Performed by: EMERGENCY MEDICINE

## 2023-08-25 PROCEDURE — 87449 NOS EACH ORGANISM AG IA: CPT

## 2023-08-25 PROCEDURE — G1004 CDSM NDSC: HCPCS

## 2023-08-25 PROCEDURE — 99285 EMERGENCY DEPT VISIT HI MDM: CPT | Performed by: EMERGENCY MEDICINE

## 2023-08-25 PROCEDURE — 83880 ASSAY OF NATRIURETIC PEPTIDE: CPT

## 2023-08-25 PROCEDURE — 36415 COLL VENOUS BLD VENIPUNCTURE: CPT

## 2023-08-25 PROCEDURE — 94760 N-INVAS EAR/PLS OXIMETRY 1: CPT

## 2023-08-25 PROCEDURE — 71045 X-RAY EXAM CHEST 1 VIEW: CPT

## 2023-08-25 PROCEDURE — 96365 THER/PROPH/DIAG IV INF INIT: CPT

## 2023-08-25 PROCEDURE — 93005 ELECTROCARDIOGRAM TRACING: CPT

## 2023-08-25 RX ORDER — ACETAMINOPHEN 325 MG/1
650 TABLET ORAL EVERY 6 HOURS PRN
Status: DISCONTINUED | OUTPATIENT
Start: 2023-08-25 | End: 2023-08-30 | Stop reason: HOSPADM

## 2023-08-25 RX ORDER — FLUTICASONE PROPIONATE 50 MCG
2 SPRAY, SUSPENSION (ML) NASAL DAILY
Status: DISCONTINUED | OUTPATIENT
Start: 2023-08-26 | End: 2023-08-30 | Stop reason: HOSPADM

## 2023-08-25 RX ORDER — HEPARIN SODIUM 1000 [USP'U]/ML
6000 INJECTION, SOLUTION INTRAVENOUS; SUBCUTANEOUS EVERY 6 HOURS PRN
Status: DISCONTINUED | OUTPATIENT
Start: 2023-08-25 | End: 2023-08-28

## 2023-08-25 RX ORDER — LORATADINE 10 MG/1
10 TABLET ORAL DAILY
Status: DISCONTINUED | OUTPATIENT
Start: 2023-08-26 | End: 2023-08-30 | Stop reason: HOSPADM

## 2023-08-25 RX ORDER — HEPARIN SODIUM 10000 [USP'U]/100ML
3-30 INJECTION, SOLUTION INTRAVENOUS
Status: DISCONTINUED | OUTPATIENT
Start: 2023-08-25 | End: 2023-08-28

## 2023-08-25 RX ORDER — BUSPIRONE HYDROCHLORIDE 10 MG/1
30 TABLET ORAL 3 TIMES DAILY
Status: DISCONTINUED | OUTPATIENT
Start: 2023-08-26 | End: 2023-08-30 | Stop reason: HOSPADM

## 2023-08-25 RX ORDER — NICOTINE 21 MG/24HR
1 PATCH, TRANSDERMAL 24 HOURS TRANSDERMAL DAILY
Status: DISCONTINUED | OUTPATIENT
Start: 2023-08-26 | End: 2023-08-25

## 2023-08-25 RX ORDER — CARVEDILOL 12.5 MG/1
25 TABLET ORAL 2 TIMES DAILY WITH MEALS
Status: DISCONTINUED | OUTPATIENT
Start: 2023-08-26 | End: 2023-08-30 | Stop reason: HOSPADM

## 2023-08-25 RX ORDER — FAMOTIDINE 20 MG/1
20 TABLET, FILM COATED ORAL DAILY
Status: DISCONTINUED | OUTPATIENT
Start: 2023-08-25 | End: 2023-08-30 | Stop reason: HOSPADM

## 2023-08-25 RX ORDER — ONDANSETRON 2 MG/ML
4 INJECTION INTRAMUSCULAR; INTRAVENOUS EVERY 6 HOURS PRN
Status: DISCONTINUED | OUTPATIENT
Start: 2023-08-25 | End: 2023-08-30 | Stop reason: HOSPADM

## 2023-08-25 RX ORDER — LEVALBUTEROL INHALATION SOLUTION 0.63 MG/3ML
0.63 SOLUTION RESPIRATORY (INHALATION)
Status: DISCONTINUED | OUTPATIENT
Start: 2023-08-25 | End: 2023-08-25

## 2023-08-25 RX ORDER — SODIUM CHLORIDE 9 MG/ML
75 INJECTION, SOLUTION INTRAVENOUS CONTINUOUS
Status: DISCONTINUED | OUTPATIENT
Start: 2023-08-25 | End: 2023-08-26

## 2023-08-25 RX ORDER — POLYETHYLENE GLYCOL 3350 17 G/17G
17 POWDER, FOR SOLUTION ORAL DAILY PRN
Status: DISCONTINUED | OUTPATIENT
Start: 2023-08-25 | End: 2023-08-30 | Stop reason: HOSPADM

## 2023-08-25 RX ORDER — GABAPENTIN 300 MG/1
600 CAPSULE ORAL 3 TIMES DAILY
Status: DISCONTINUED | OUTPATIENT
Start: 2023-08-25 | End: 2023-08-30 | Stop reason: HOSPADM

## 2023-08-25 RX ORDER — HEPARIN SODIUM 1000 [USP'U]/ML
6000 INJECTION, SOLUTION INTRAVENOUS; SUBCUTANEOUS ONCE
Status: COMPLETED | OUTPATIENT
Start: 2023-08-25 | End: 2023-08-25

## 2023-08-25 RX ORDER — AMLODIPINE BESYLATE 10 MG/1
10 TABLET ORAL DAILY
Status: DISCONTINUED | OUTPATIENT
Start: 2023-08-26 | End: 2023-08-30 | Stop reason: HOSPADM

## 2023-08-25 RX ORDER — ALBUTEROL SULFATE 90 UG/1
2 AEROSOL, METERED RESPIRATORY (INHALATION) EVERY 4 HOURS PRN
Status: DISCONTINUED | OUTPATIENT
Start: 2023-08-25 | End: 2023-08-25

## 2023-08-25 RX ORDER — IPRATROPIUM BROMIDE AND ALBUTEROL SULFATE 2.5; .5 MG/3ML; MG/3ML
3 SOLUTION RESPIRATORY (INHALATION) EVERY 6 HOURS PRN
Status: DISCONTINUED | OUTPATIENT
Start: 2023-08-25 | End: 2023-08-27

## 2023-08-25 RX ORDER — ONDANSETRON 2 MG/ML
4 INJECTION INTRAMUSCULAR; INTRAVENOUS ONCE
Status: COMPLETED | OUTPATIENT
Start: 2023-08-25 | End: 2023-08-25

## 2023-08-25 RX ORDER — NICOTINE 21 MG/24HR
1 PATCH, TRANSDERMAL 24 HOURS TRANSDERMAL DAILY
Status: DISCONTINUED | OUTPATIENT
Start: 2023-08-25 | End: 2023-08-30 | Stop reason: HOSPADM

## 2023-08-25 RX ORDER — LEVALBUTEROL INHALATION SOLUTION 1.25 MG/3ML
1.25 SOLUTION RESPIRATORY (INHALATION)
Status: DISCONTINUED | OUTPATIENT
Start: 2023-08-26 | End: 2023-08-30 | Stop reason: HOSPADM

## 2023-08-25 RX ORDER — HEPARIN SODIUM 1000 [USP'U]/ML
3000 INJECTION, SOLUTION INTRAVENOUS; SUBCUTANEOUS EVERY 6 HOURS PRN
Status: DISCONTINUED | OUTPATIENT
Start: 2023-08-25 | End: 2023-08-28

## 2023-08-25 RX ORDER — LISINOPRIL 10 MG/1
10 TABLET ORAL DAILY
Status: DISCONTINUED | OUTPATIENT
Start: 2023-08-26 | End: 2023-08-30 | Stop reason: HOSPADM

## 2023-08-25 RX ADMIN — IOHEXOL 80 ML: 350 INJECTION, SOLUTION INTRAVENOUS at 16:03

## 2023-08-25 RX ADMIN — AZITHROMYCIN MONOHYDRATE 500 MG: 500 INJECTION, POWDER, LYOPHILIZED, FOR SOLUTION INTRAVENOUS at 22:03

## 2023-08-25 RX ADMIN — FAMOTIDINE 20 MG: 20 TABLET ORAL at 22:02

## 2023-08-25 RX ADMIN — NICOTINE 1 PATCH: 14 PATCH, EXTENDED RELEASE TRANSDERMAL at 22:02

## 2023-08-25 RX ADMIN — LEVALBUTEROL HYDROCHLORIDE 0.63 MG: 0.63 SOLUTION RESPIRATORY (INHALATION) at 20:59

## 2023-08-25 RX ADMIN — ONDANSETRON 4 MG: 2 INJECTION INTRAMUSCULAR; INTRAVENOUS at 12:10

## 2023-08-25 RX ADMIN — HEPARIN SODIUM 6000 UNITS: 1000 INJECTION INTRAVENOUS; SUBCUTANEOUS at 18:41

## 2023-08-25 RX ADMIN — IPRATROPIUM BROMIDE 0.5 MG: 0.5 SOLUTION RESPIRATORY (INHALATION) at 20:59

## 2023-08-25 RX ADMIN — SODIUM CHLORIDE 75 ML/HR: 0.9 INJECTION, SOLUTION INTRAVENOUS at 22:03

## 2023-08-25 RX ADMIN — GABAPENTIN 600 MG: 300 CAPSULE ORAL at 22:02

## 2023-08-25 RX ADMIN — CEFTRIAXONE SODIUM 1000 MG: 10 INJECTION, POWDER, FOR SOLUTION INTRAVENOUS at 13:41

## 2023-08-25 RX ADMIN — HEPARIN SODIUM 18 UNITS/KG/HR: 10000 INJECTION, SOLUTION INTRAVENOUS at 18:40

## 2023-08-25 NOTE — ED PROVIDER NOTES
History  Chief Complaint   Patient presents with   • Shortness of Breath     Ongoing for the last few months, has been having shortness of breath. States she unable to breathe out of nose. Denies chest pain. Started today with nausea/vomiting. Had Covid in July and these symptoms started after then. Diaphoretic in triage with low SPO3     69-year-old female with history of hypertension, presents today with worsening shortness of breath, diaphoresis. Patient was diagnosed with COVID in early July of this year and has had symptoms since. She has been seen by her primary care physician multiple times and they have been telling her she has "long COVID."  Patient did endorse nausea and vomiting today, no blood. Denies urinary symptoms, chest pain, headache, vision changes, any pain at the moment. Prior to Admission Medications   Prescriptions Last Dose Informant Patient Reported? Taking?    ASPIRIN-ACETAMINOPHEN-CAFFEINE PO  Self Yes No   Sig: Take by mouth   Calcium Carb-Cholecalciferol (OSCAL-D) 500 mg-200 units per tablet  Self Yes No   Sig: Take 1 tablet by mouth 2 (two) times a day with meals   EPINEPHrine (EPIPEN) 0.3 mg/0.3 mL SOAJ  Self No No   Sig: Inject 0.3 mL (0.3 mg total) into a muscle once for 1 dose   NON FORMULARY  Self Yes No   Sig: Hempvana roll on cream for pain   acetaminophen (TYLENOL) 650 mg CR tablet  Self No No   Sig: Take 1 tablet (650 mg total) by mouth every 8 (eight) hours as needed for moderate pain   albuterol (PROVENTIL HFA,VENTOLIN HFA) 90 mcg/act inhaler   No No   Sig: Inhale 2 puffs every 6 (six) hours as needed for wheezing or shortness of breath   amLODIPine (NORVASC) 10 mg tablet   No No   Sig: TAKE 1 TABLET BY MOUTH EVERY DAY   aspirin (ECOTRIN LOW STRENGTH) 81 mg EC tablet  Self Yes No   Sig: Take 81 mg by mouth daily   busPIRone (BUSPAR) 30 MG tablet   No No   Sig: Take 1 tablet (30 mg total) by mouth 3 (three) times a day   carbamide peroxide (DEBROX) 6.5 % otic solution   No No   Sig: Administer 5 drops into both ears 2 (two) times a day   carvedilol (COREG) 25 mg tablet   No No   Sig: TAKE 1 TABLET BY MOUTH TWICE A DAY WITH FOOD   diclofenac sodium (VOLTAREN) 1 %  Self No No   Sig: APPLY 2 GRAMS TO AFFECTED AREA 4 TIMES A DAY   diphenhydrAMINE (BENADRYL) 25 mg tablet  Self No No   Sig: Take 1 tablet (25 mg total) by mouth every 6 (six) hours as needed for itching for up to 5 days   famotidine (PEPCID) 20 mg tablet  Self No No   Sig: Take 1 tablet (20 mg total) by mouth 2 (two) times a day   fluticasone (FLONASE) 50 mcg/act nasal spray   No No   Sig: SPRAY 2 SPRAYS INTO EACH NOSTRIL EVERY DAY   gabapentin (NEURONTIN) 600 MG tablet   No No   Sig: Take 1 tablet (600 mg total) by mouth 3 (three) times a day   lisinopril (ZESTRIL) 5 mg tablet   No No   Sig: TAKE 1 TABLET (5 MG TOTAL) BY MOUTH DAILY. Patient taking differently: Take 10 mg by mouth daily Taking 2 tablets   loratadine (CLARITIN) 10 mg tablet   No No   Sig: Take 1 tablet (10 mg total) by mouth daily   methocarbamol (ROBAXIN) 500 mg tablet  Self No No   Sig: Take 1 tablet (500 mg total) by mouth daily at bedtime as needed for muscle spasms   naloxone (NARCAN) 4 mg/0.1 mL nasal spray  Self No No   Sig: Administer 1 spray into a nostril. If no response after 2-3 minutes, give another dose in the other nostril using a new spray.    neomycin-polymyxin-hydrocortisone (CORTISPORIN) 0.35%-10,000 units/mL-1% otic suspension   No No   Sig: Administer 4 drops into the left ear 3 (three) times a day   oxyCODONE (OXY-IR) 5 MG capsule   No No   Sig: Take 1 capsule (5 mg total) by mouth 2 (two) times a day as needed for moderate pain Max Daily Amount: 10 mg   sertraline (ZOLOFT) 50 mg tablet   No No   Sig: TAKE 1 TABLET BY MOUTH EVERY DAY      Facility-Administered Medications: None       Past Medical History:   Diagnosis Date   • Anxiety    • Depression    • Fatty liver    • Hyperlipidemia    • Hypertension    • Psychiatric disorder    • Varicella        Past Surgical History:   Procedure Laterality Date   • BREAST SURGERY Bilateral     Reduction Procedure   • CARDIAC SURGERY     •  SECTION      x4   • DILATION AND CURETTAGE OF UTERUS     • ECTOPIC PREGNANCY SURGERY         Family History   Problem Relation Age of Onset   • Lung cancer Mother    • Cirrhosis Father    • Hypertension Sister    • Bipolar disorder Sister    • Heart disease Sister    • Diabetes Family    • Heart disease Family    • Hypertension Family    • Stroke Family    • Thyroid disease Family      I have reviewed and agree with the history as documented. E-Cigarette/Vaping   • E-Cigarette Use Never User      E-Cigarette/Vaping Substances   • Nicotine No    • THC No    • CBD No    • Flavoring No    • Other No    • Unknown No      Social History     Tobacco Use   • Smoking status: Every Day     Packs/day: 1.00     Types: Cigarettes   • Smokeless tobacco: Never   • Tobacco comments:     2 Black and milds per day   Vaping Use   • Vaping Use: Never used   Substance Use Topics   • Alcohol use: Not Currently   • Drug use: Yes     Types: Marijuana     Comment: for pain        Review of Systems   Constitutional: Positive for diaphoresis and fever. Negative for chills. HENT: Positive for congestion and rhinorrhea. Negative for sneezing. Eyes: Negative for pain and visual disturbance. Respiratory: Positive for cough and shortness of breath. Cardiovascular: Negative for chest pain and palpitations. Gastrointestinal: Negative for abdominal pain, constipation, diarrhea, nausea and vomiting. Genitourinary: Negative for dysuria and hematuria. Musculoskeletal: Negative for arthralgias and back pain. Skin: Negative for color change and rash. Neurological: Negative for syncope, weakness, numbness and headaches. All other systems reviewed and are negative.       Physical Exam  ED Triage Vitals   Temperature Pulse Respirations Blood Pressure SpO2   23 1133 08/25/23 1133 08/25/23 1133 08/25/23 1140 08/25/23 1138   98.4 °F (36.9 °C) (!) 124 22 (!) 178/92 (!) 84 %      Temp Source Heart Rate Source Patient Position - Orthostatic VS BP Location FiO2 (%)   08/25/23 1133 08/25/23 1133 08/25/23 1133 08/25/23 1133 --   Oral Monitor Sitting Left arm       Pain Score       08/25/23 1200       No Pain             Orthostatic Vital Signs  Vitals:    08/25/23 1536 08/25/23 1645 08/25/23 1700 08/25/23 1730   BP: 158/86 (!) 173/90 (!) 177/89 158/74   Pulse: 102 (!) 114 (!) 110 105   Patient Position - Orthostatic VS: Sitting Sitting Sitting Sitting       Physical Exam  Vitals and nursing note reviewed. Constitutional:       General: She is not in acute distress. Appearance: She is well-developed. HENT:      Head: Normocephalic and atraumatic. Eyes:      Conjunctiva/sclera: Conjunctivae normal.   Cardiovascular:      Rate and Rhythm: Regular rhythm. Tachycardia present. Heart sounds: No murmur heard. Pulmonary:      Effort: Pulmonary effort is normal. No respiratory distress. Breath sounds: Decreased breath sounds present. No wheezing, rhonchi or rales. Abdominal:      Palpations: Abdomen is soft. Tenderness: There is no abdominal tenderness. Musculoskeletal:         General: No swelling. Cervical back: Neck supple. Skin:     General: Skin is warm and dry. Capillary Refill: Capillary refill takes less than 2 seconds. Neurological:      Mental Status: She is alert.    Psychiatric:         Mood and Affect: Mood normal.         ED Medications  Medications   heparin (porcine) injection 6,000 Units (has no administration in time range)   heparin (porcine) 25,000 units in 0.45% NaCl 250 mL infusion (premix) (has no administration in time range)   heparin (porcine) injection 6,000 Units (has no administration in time range)   heparin (porcine) injection 3,000 Units (has no administration in time range)   ondansetron (ZOFRAN) injection 4 mg (4 mg Intravenous Given 8/25/23 1210)   ceftriaxone (ROCEPHIN) 1 g/50 mL in dextrose IVPB (0 mg Intravenous Stopped 8/25/23 1414)   iohexol (OMNIPAQUE) 350 MG/ML injection (SINGLE-DOSE) 80 mL (80 mL Intravenous Given 8/25/23 1603)       Diagnostic Studies  Results Reviewed     Procedure Component Value Units Date/Time    HS Troponin I 4hr [[id number]] Collected: 08/25/23 1731    Lab Status: In process Specimen: Blood from Arm, Right Updated: 08/25/23 1739    Sputum culture and Gram stain [[id number]]     Lab Status: No result Specimen: Sputum     HS Troponin I 2hr [[id number]]  (Abnormal) Collected: 08/25/23 1339    Lab Status: Final result Specimen: Blood from Arm, Left Updated: 08/25/23 1416     hs TnI 2hr 60 ng/L      Delta 2hr hsTnI -13 ng/L     Protime-INR [[id number]]  (Normal) Collected: 08/25/23 1339    Lab Status: Final result Specimen: Blood from Arm, Left Updated: 08/25/23 1405     Protime 13.5 seconds      INR 0.97    APTT [[id number]]  (Normal) Collected: 08/25/23 1339    Lab Status: Final result Specimen: Blood from Arm, Left Updated: 08/25/23 1405     PTT 28 seconds     B-Type Natriuretic Peptide(BNP) [[id number]]  (Abnormal) Collected: 08/25/23 1308    Lab Status: Final result Specimen: Blood from Arm, Right Updated: 08/25/23 1338      pg/mL     FLU/RSV/COVID - if FLU/RSV clinically relevant [[id number]]  (Normal) Collected: 08/25/23 1157    Lab Status: Final result Specimen: Nares from Nose Updated: 08/25/23 1249     SARS-CoV-2 Negative     INFLUENZA A PCR Negative     INFLUENZA B PCR Negative     RSV PCR Negative    Narrative:      FOR PEDIATRIC PATIENTS - copy/paste COVID Guidelines URL to browser: [email or web address]    SARS-CoV-2 assay is a Nucleic Acid Amplification assay intended for the  qualitative detection of nucleic acid from SARS-CoV-2 in nasopharyngeal  swabs.  Results are for the presumptive identification of SARS-CoV-2 RNA.    Positive results are indicative of infection with SARS-CoV-2, the virus  causing COVID-19, but do not rule out bacterial infection or co-infection  with other viruses. Laboratories within the [facility] and its  territories are required to report all positive results to the appropriate  public health authorities. Negative results do not preclude SARS-CoV-2  infection and should not be used as the sole basis for treatment or other  patient management decisions. Negative results must be combined with  clinical observations, patient history, and epidemiological information. This test has not been FDA cleared or approved. This test has been authorized by FDA under an Emergency Use Authorization  (EUA). This test is only authorized for the duration of time the  declaration that circumstances exist justifying the authorization of the  emergency use of an in vitro diagnostic tests for detection of SARS-CoV-2  virus and/or diagnosis of COVID-19 infection under section 564(b)(1) of  the Act, 21 U. S.C. 093JHR-8(B)(9), unless the authorization is terminated  or revoked sooner. The test has been validated but independent review by FDA  and CLIA is pending. Test performed using [employer] GeneXpert: This RT-PCR assay targets N2,  a region unique to SARS-CoV-2. A conserved region in the E-gene was chosen  for pan-Sarbecovirus detection which includes SARS-CoV-2. According to CMS-2020-01-R, this platform meets the definition of high-throughput technology. D-Dimer [[id number]]  (Abnormal) Collected: 08/25/23 1157    Lab Status: Final result Specimen: Blood from Arm, Right Updated: 08/25/23 1242     D-Dimer, Quant 0.99 ug/ml FEU     Narrative:       In the evaluation for possible pulmonary embolism, in the appropriate (Well's Score of 4 or less) patient, the age adjusted d-dimer cutoff for this patient can be calculated as:    Age x 0.01 (in ug/mL) for Age-adjusted D-dimer exclusion threshold for a patient over 50 years.    HS Troponin 0hr (reflex protocol) [[id number]]  (Abnormal) Collected: 08/25/23 1157    Lab Status: Final result Specimen: Blood from Arm, Right Updated: 08/25/23 1230     hs TnI 0hr 73 ng/L     Comprehensive metabolic panel [[id number]]  (Abnormal) Collected: 08/25/23 1157    Lab Status: Final result Specimen: Blood from Arm, Right Updated: 08/25/23 1228     Sodium 139 mmol/L      Potassium 4.0 mmol/L      Chloride 107 mmol/L      CO2 24 mmol/L      ANION GAP 8 mmol/L      BUN 15 mg/dL      Creatinine 1.01 mg/dL      Glucose 131 mg/dL      Calcium 9.9 mg/dL      AST 14 U/L      ALT 6 U/L      Alkaline Phosphatase 78 U/L      Total Protein 8.5 g/dL      Albumin 3.6 g/dL      Total Bilirubin 0.31 mg/dL      eGFR 56 ml/min/1.73sq m     Narrative:      [facility] guidelines for Chronic Kidney Disease (CKD):   •  Stage 1 with normal or high GFR (GFR > 90 mL/min/1.73 square meters)  •  Stage 2 Mild CKD (GFR = 60-89 mL/min/1.73 square meters)  •  Stage 3A Moderate CKD (GFR = 45-59 mL/min/1.73 square meters)  •  Stage 3B Moderate CKD (GFR = 30-44 mL/min/1.73 square meters)  •  Stage 4 Severe CKD (GFR = 15-29 mL/min/1.73 square meters)  •  Stage 5 End Stage CKD (GFR <15 mL/min/1.73 square meters)  Note: GFR calculation is accurate only with a steady state creatinine    CBC and differential [[id number]]  (Abnormal) Collected: 08/25/23 1157    Lab Status: Final result Specimen: Blood from Arm, Right Updated: 08/25/23 1209     WBC 17.92 Thousand/uL      RBC 4.89 Million/uL      Hemoglobin 15.2 g/dL      Hematocrit 47.1 %      MCV 96 fL      MCH 31.1 pg      MCHC 32.3 g/dL      RDW 15.6 %      MPV 9.6 fL      Platelets 382 Thousands/uL      nRBC 0 /100 WBCs      Neutrophils Relative 85 %      Immat GRANS % 1 %      Lymphocytes Relative 6 %      Monocytes Relative 6 %      Eosinophils Relative 1 %      Basophils Relative 1 %      Neutrophils Absolute 15.53 Thousands/µL      Immature Grans Absolute 0.09 Thousand/uL      Lymphocytes Absolute 1.00 Thousands/µL      Monocytes Absolute 1.03 Thousand/µL      Eosinophils Absolute 0.18 Thousand/µL      Basophils Absolute 0.09 Thousands/µL                  CTA ED chest PE Study   Final Result by Vinod Hanna MD (08/25 1703)      Limited study. There is equivocal embolus in the posterior basal segment left lower lobe. Other smaller segments are difficult to assess due to motion and vascular crowding. There is mucous plugging in the left mainstem bronchus with volume loss in portions of the left lower lobe and left upper lobe. Aspiration pneumonia is not excluded. There is dilatation of the main pulmonary artery and proximal pulmonary segment suggesting pulmonary hypertension this is more likely chronic than acute given the degree of distention. Emphysema is present. There is significant mediastinal and hilar adenopathy suggesting underlying neoplasm more likely than simple reactive nodes. Enlarging lesion at T12 is again demonstrated of uncertain etiology. This has been present since 2013. Perhaps a plasmacytoma is a consideration. Neoplastic work-up is advised. Pulmonology consultation is also advised to assess endobronchial obstruction on the left. This was discussed with resident physician Dr. Kiah Arnold at 4:58 p.m. Follow-up was marked in the epic system      Workstation performed: [id number]         XR chest 1 view portable   Final Result by Kathia Merino MD (08/25 1222)   Increased cardiomegaly. Development of CHF.       Probable left basal infiltrate         Workstation performed: [id number]               Procedures  ECG 12 Lead Documentation Only    Date/Time: 8/25/2023 12:01 PM    Performed by: Leticia Bang DO  Authorized by: Leticia Bang DO    Indications / Diagnosis:  SOB  ECG reviewed by me, the ED Provider: yes    Patient location:  ED  Previous ECG:     Previous ECG: Compared to current    Comparison to cardiac monitor: Yes    Interpretation:     Interpretation: abnormal    Rate:     ECG rate:  103    ECG rate assessment: tachycardic    Rhythm:     Rhythm: sinus rhythm    Ectopy:     Ectopy: none    QRS:     QRS axis:  Right    QRS intervals:  Normal  Conduction:     Conduction: normal    ST segments:     ST segments:  Normal  T waves:     T waves: normal    Other findings:     Other findings: [facility]            ED Course  ED Course as of 08/25/23 1807   Fri Aug 25, 2023   1253 D-Dimer, Quant(!): 0.99  CT PE study ordered   1253 hs TnI 0hr(!): 73  Likely due to tachycardia   1253 NRB 15 L improves all symptoms. Almost immediately after transition patient to nasal cannula, she became hypoxic and work of breathing increased slightly. 1254 FLU/RSV/COVID - if FLU/RSV clinically relevant  Negative   1255 Comprehensive metabolic panel(!)  Within normal limits, actually improved from baseline, reassuring with no signs of endorgan damage   1255 CBC and differential(!)  Leukocytosis, otherwise within normal limits   1255 XR chest 1 view portable  Possible left basal infiltrate. Cardiomegaly and pulmonary congestion suggestive of CHF.   1409 BNP(!): 272   1421 Delta 2hr hsTnI: -13   1503 Upon reevaluation, patient still on nonrebreather and satting in the mid 90s. Still pending CT scan in order to admit the patient. 1707 CTA ED chest PE Study  IMPRESSION:     Limited study.     There is equivocal embolus in the posterior basal segment left lower lobe. Other smaller segments are difficult to assess due to motion and vascular crowding.     There is mucous plugging in the left mainstem bronchus with volume loss in portions of the left lower lobe and left upper lobe.  Aspiration pneumonia is not excluded.     There is dilatation of the main pulmonary artery and proximal pulmonary segment suggesting pulmonary hypertension this is more likely chronic than acute given the degree of distention.     Emphysema is present.     There is significant mediastinal and hilar adenopathy suggesting underlying neoplasm more likely than simple reactive nodes.     Enlarging lesion at T12 is again demonstrated of uncertain etiology. This has been present since 2013. Perhaps a plasmacytoma is a consideration.     Neoplastic work-up is advised. Pulmonology consultation is also advised to assess endobronchial obstruction on the left. 1732 On reevaluation, patient still needs nonrebreather and saturating in the mid 90s. Patient was informed of all the CT findings. CRAFFT    Flowsheet Row Most Recent Value   CRAYULYT Initial Screen: During the past 12 months, did you:    1. Drink any alcohol (more than a few sips)? No Filed at: 08/25/2023 1355   2. Smoke any marijuana or hashish No Filed at: 08/25/2023 1355   3. Use anything else to get high? ("anything else" includes illegal drugs, over the counter and prescription drugs, and things that you sniff or 'monroy')? No Filed at: 08/25/2023 1355                          SBIRT 22yo+    Flowsheet Row Most Recent Value   Initial Alcohol Screen: US AUDIT-C     1. How often do you have a drink containing alcohol? 0 Filed at: 08/25/2023 1203   2. How many drinks containing alcohol do you have on a typical day you are drinking? 0 Filed at: 08/25/2023 1203   3a. Male UNDER 65: How often do you have five or more drinks on one occasion? 0 Filed at: 08/25/2023 1203   3b. FEMALE Any Age, or MALE 65+: How often do you have 4 or more drinks on one occassion? 0 Filed at: 08/25/2023 1203   Audit-C Score 0 Filed at: 08/25/2023 1203   MARK: How many times in the past year have you. .. Used an illegal drug or used a prescription medication for non-medical reasons?  Never Filed at: 08/25/2023 1203          Wells' Criteria for PE    Flowsheet Row Most Recent Value   Wells' Criteria for PE    Clinical signs and symptoms of DVT 0 Filed at: 08/25/2023 1155   PE is primary diagnosis or equally likely 0 Filed at: 08/25/2023 1155   HR >100 1.5 Filed at: 08/25/2023 1155   Immobilization at least 3 days or Surgery in the previous 4 weeks 0 Filed at: 08/25/2023 1155   Previous, objectively diagnosed PE or DVT 0 Filed at: 08/25/2023 1155   Hemoptysis 0 Filed at: 08/25/2023 1155   Malignancy with treatment within 6 months or palliative 0 Filed at: 08/25/2023 1155   Wells' Criteria Total 1.5 Filed at: 08/25/2023 [address] came to the ED due to worsening shortness of breath and diaphoresis. Patient was diagnosed with COVID about a month ago and has had worsening symptoms since. Patient saw her primary care physician earlier in the week and diagnosed her with "long COVID" according to the patient and her daughter. On arrival patient was hypoxic, tachypneic, tachycardic. Patient was placed on nasal cannula with no improvement, mostly due to her not being able to breathe through her nose due to congestion. Patient was placed on a simple facemask and improved to the mid 90s saturation. After just a little while, patient was breathing much more comfortably and appears stable. Lab work was remarkable for elevated D-dimer suspicious for PE, due to her symptoms, and CT PE study ordered. Other labs were remarkable for slightly elevated BNP, cardiomegaly found on chest x-ray, which suggested new diagnosis of CHF. Patient has leukocytosis and possible consolidation versus atelectasis on chest x-ray, so gram of Rocephin initiated here in the ED. Troponin was elevated to 73, however decreased significantly on 2-hour, this was likely due to prolonged tachycardia. No other signs of acute endorgan damage. CT PE study had multiple significant findings. Patient was never diagnosed with any of these previously.   CT PE study showed pulmonary embolism, pulmonary hypertension, mucous plugging with multiple lobe collapse, hilar lymphadenopathy suspicious for neoplastic disease, worsening T12 lesion, and emphysema. Patient was advised of all findings and informed she will be admitted to the hospital for pulmonology and hospitalist work-up. Patient understood and agreed to plan. Patient admitted in serious condition. Amount and/or Complexity of Data Reviewed  Labs: ordered. Decision-making details documented in ED Course. Radiology: ordered. Decision-making details documented in ED Course. Risk  Prescription drug management. Decision regarding hospitalization. Disposition  Final diagnoses:   Mucus plugging of bronchi   Emphysema of lung ([address])   Pulmonary hypertension (HCC)   Lymphadenopathy, hilar   Pulmonary embolism (HCC)   CHF (congestive heart failure) ([address])   Acute respiratory failure with hypoxia ([address])     Time reflects when diagnosis was documented in both MDM as applicable and the Disposition within this note     Time User Action Codes Description Comment    8/25/2023  5:42 PM Durenda Shoulders Add [T17.500A] Mucus plugging of bronchi     8/25/2023  5:42 PM Janett Vivien F Add [J43.9] Emphysema of lung ([address])     8/25/2023  5:42 PM Durenda Shoulders Add [I27.20] Pulmonary hypertension ([address])     8/25/2023  5:42 PM Janett Vivien F Add [R59.0] Lymphadenopathy, hilar     8/25/2023  5:42 PM Durenda Shoulders Add [I26.99] Pulmonary embolism ([address])     8/25/2023  5:42 PM Durenda Shoulders Add [I50.9] CHF (congestive heart failure) ([address])     8/25/2023  6:01 PM Durenda Shoulders Add [J96.01] Acute respiratory failure with hypoxia [facility])       ED Disposition     ED Disposition   Admit    Condition   Stable    Date/Time   Fri Aug 25, 2023  5:54 PM    Comment   Case was discussed with DAVE and the patient's admission status was agreed to be Admission Status: inpatient status to the service of Dr. Alo Antunez.            Follow-up Information    None         Patient's Medications   Discharge Prescriptions    No medications on file     No discharge procedures on file. PDMP Review       Value Time User    PDMP Reviewed  Yes 8/25/2023 11:51 AM Vijay Yeager DO           ED Provider  Attending physically available and evaluated [facility]. I managed the patient along with the ED Attending.     Electronically Signed by         Vijay Yeager DO  08/25/23 0107

## 2023-08-26 LAB
ANION GAP SERPL CALCULATED.3IONS-SCNC: 4 MMOL/L
APTT PPP: 64 SECONDS (ref 23–37)
APTT PPP: 89 SECONDS (ref 23–37)
BASOPHILS # BLD AUTO: 0.07 THOUSANDS/ÂΜL (ref 0–0.1)
BASOPHILS NFR BLD AUTO: 1 % (ref 0–1)
BUN SERPL-MCNC: 11 MG/DL (ref 5–25)
CALCIUM SERPL-MCNC: 9.1 MG/DL (ref 8.4–10.2)
CHLORIDE SERPL-SCNC: 108 MMOL/L (ref 96–108)
CO2 SERPL-SCNC: 27 MMOL/L (ref 21–32)
CREAT SERPL-MCNC: 0.83 MG/DL (ref 0.6–1.3)
EOSINOPHIL # BLD AUTO: 0.06 THOUSAND/ÂΜL (ref 0–0.61)
EOSINOPHIL NFR BLD AUTO: 0 % (ref 0–6)
ERYTHROCYTE [DISTWIDTH] IN BLOOD BY AUTOMATED COUNT: 15.9 % (ref 11.6–15.1)
GFR SERPL CREATININE-BSD FRML MDRD: 72 ML/MIN/1.73SQ M
GLUCOSE SERPL-MCNC: 96 MG/DL (ref 65–140)
HCT VFR BLD AUTO: 44.1 % (ref 34.8–46.1)
HGB BLD-MCNC: 14 G/DL (ref 11.5–15.4)
IMM GRANULOCYTES # BLD AUTO: 0.06 THOUSAND/UL (ref 0–0.2)
IMM GRANULOCYTES NFR BLD AUTO: 0 % (ref 0–2)
LYMPHOCYTES # BLD AUTO: 1.35 THOUSANDS/ÂΜL (ref 0.6–4.47)
LYMPHOCYTES NFR BLD AUTO: 10 % (ref 14–44)
MAGNESIUM SERPL-MCNC: 1.9 MG/DL (ref 1.9–2.7)
MCH RBC QN AUTO: 30.7 PG (ref 26.8–34.3)
MCHC RBC AUTO-ENTMCNC: 31.7 G/DL (ref 31.4–37.4)
MCV RBC AUTO: 97 FL (ref 82–98)
MONOCYTES # BLD AUTO: 1.22 THOUSAND/ÂΜL (ref 0.17–1.22)
MONOCYTES NFR BLD AUTO: 9 % (ref 4–12)
NEUTROPHILS # BLD AUTO: 11.41 THOUSANDS/ÂΜL (ref 1.85–7.62)
NEUTS SEG NFR BLD AUTO: 80 % (ref 43–75)
NRBC BLD AUTO-RTO: 0 /100 WBCS
PLATELET # BLD AUTO: 276 THOUSANDS/UL (ref 149–390)
PMV BLD AUTO: 10.6 FL (ref 8.9–12.7)
POTASSIUM SERPL-SCNC: 5 MMOL/L (ref 3.5–5.3)
PROCALCITONIN SERPL-MCNC: 0.15 NG/ML
RBC # BLD AUTO: 4.56 MILLION/UL (ref 3.81–5.12)
SODIUM SERPL-SCNC: 139 MMOL/L (ref 135–147)
WBC # BLD AUTO: 14.17 THOUSAND/UL (ref 4.31–10.16)

## 2023-08-26 PROCEDURE — 80048 BASIC METABOLIC PNL TOTAL CA: CPT

## 2023-08-26 PROCEDURE — 94760 N-INVAS EAR/PLS OXIMETRY 1: CPT

## 2023-08-26 PROCEDURE — 99233 SBSQ HOSP IP/OBS HIGH 50: CPT | Performed by: INTERNAL MEDICINE

## 2023-08-26 PROCEDURE — 84145 PROCALCITONIN (PCT): CPT

## 2023-08-26 PROCEDURE — 94640 AIRWAY INHALATION TREATMENT: CPT

## 2023-08-26 PROCEDURE — 87081 CULTURE SCREEN ONLY: CPT

## 2023-08-26 PROCEDURE — 94664 DEMO&/EVAL PT USE INHALER: CPT

## 2023-08-26 PROCEDURE — 85730 THROMBOPLASTIN TIME PARTIAL: CPT

## 2023-08-26 PROCEDURE — 94668 MNPJ CHEST WALL SBSQ: CPT

## 2023-08-26 PROCEDURE — 83735 ASSAY OF MAGNESIUM: CPT

## 2023-08-26 PROCEDURE — 85025 COMPLETE CBC W/AUTO DIFF WBC: CPT

## 2023-08-26 PROCEDURE — 99223 1ST HOSP IP/OBS HIGH 75: CPT | Performed by: INTERNAL MEDICINE

## 2023-08-26 RX ORDER — FUROSEMIDE 10 MG/ML
40 INJECTION INTRAMUSCULAR; INTRAVENOUS ONCE
Status: COMPLETED | OUTPATIENT
Start: 2023-08-26 | End: 2023-08-26

## 2023-08-26 RX ORDER — SODIUM CHLORIDE FOR INHALATION 3 %
4 VIAL, NEBULIZER (ML) INHALATION EVERY 8 HOURS
Status: DISCONTINUED | OUTPATIENT
Start: 2023-08-26 | End: 2023-08-26

## 2023-08-26 RX ORDER — SODIUM CHLORIDE FOR INHALATION 3 %
4 VIAL, NEBULIZER (ML) INHALATION
Status: DISCONTINUED | OUTPATIENT
Start: 2023-08-26 | End: 2023-08-29

## 2023-08-26 RX ORDER — OXYCODONE HYDROCHLORIDE 5 MG/1
5 TABLET ORAL 2 TIMES DAILY PRN
Status: DISCONTINUED | OUTPATIENT
Start: 2023-08-26 | End: 2023-08-30 | Stop reason: HOSPADM

## 2023-08-26 RX ADMIN — GABAPENTIN 600 MG: 300 CAPSULE ORAL at 15:28

## 2023-08-26 RX ADMIN — BUSPIRONE HYDROCHLORIDE 30 MG: 10 TABLET ORAL at 08:26

## 2023-08-26 RX ADMIN — VANCOMYCIN HYDROCHLORIDE 750 MG: 750 INJECTION, SOLUTION INTRAVENOUS at 23:13

## 2023-08-26 RX ADMIN — LISINOPRIL 10 MG: 10 TABLET ORAL at 08:21

## 2023-08-26 RX ADMIN — LORATADINE 10 MG: 10 TABLET ORAL at 08:21

## 2023-08-26 RX ADMIN — FLUTICASONE PROPIONATE 2 SPRAY: 50 SPRAY, METERED NASAL at 08:27

## 2023-08-26 RX ADMIN — IPRATROPIUM BROMIDE 0.5 MG: 0.5 SOLUTION RESPIRATORY (INHALATION) at 13:17

## 2023-08-26 RX ADMIN — IPRATROPIUM BROMIDE 0.5 MG: 0.5 SOLUTION RESPIRATORY (INHALATION) at 07:49

## 2023-08-26 RX ADMIN — CEFTRIAXONE SODIUM 1000 MG: 10 INJECTION, POWDER, FOR SOLUTION INTRAVENOUS at 14:30

## 2023-08-26 RX ADMIN — OXYCODONE HYDROCHLORIDE 5 MG: 5 TABLET ORAL at 09:35

## 2023-08-26 RX ADMIN — SODIUM CHLORIDE SOLN NEBU 3% 4 ML: 3 NEBU SOLN at 19:18

## 2023-08-26 RX ADMIN — FUROSEMIDE 40 MG: 10 INJECTION, SOLUTION INTRAMUSCULAR; INTRAVENOUS at 11:49

## 2023-08-26 RX ADMIN — FAMOTIDINE 20 MG: 20 TABLET ORAL at 08:21

## 2023-08-26 RX ADMIN — OXYCODONE HYDROCHLORIDE 5 MG: 5 TABLET ORAL at 20:40

## 2023-08-26 RX ADMIN — IPRATROPIUM BROMIDE 0.5 MG: 0.5 SOLUTION RESPIRATORY (INHALATION) at 19:18

## 2023-08-26 RX ADMIN — AMLODIPINE BESYLATE 10 MG: 5 TABLET ORAL at 08:21

## 2023-08-26 RX ADMIN — BUSPIRONE HYDROCHLORIDE 30 MG: 10 TABLET ORAL at 15:29

## 2023-08-26 RX ADMIN — BUSPIRONE HYDROCHLORIDE 30 MG: 10 TABLET ORAL at 20:37

## 2023-08-26 RX ADMIN — AZITHROMYCIN MONOHYDRATE 500 MG: 500 INJECTION, POWDER, LYOPHILIZED, FOR SOLUTION INTRAVENOUS at 21:43

## 2023-08-26 RX ADMIN — GABAPENTIN 600 MG: 300 CAPSULE ORAL at 20:37

## 2023-08-26 RX ADMIN — LEVALBUTEROL HYDROCHLORIDE 1.25 MG: 1.25 SOLUTION RESPIRATORY (INHALATION) at 19:18

## 2023-08-26 RX ADMIN — CARVEDILOL 25 MG: 12.5 TABLET, FILM COATED ORAL at 15:30

## 2023-08-26 RX ADMIN — HEPARIN SODIUM 18 UNITS/KG/HR: 10000 INJECTION, SOLUTION INTRAVENOUS at 15:35

## 2023-08-26 RX ADMIN — VANCOMYCIN HYDROCHLORIDE 1500 MG: 5 INJECTION, POWDER, LYOPHILIZED, FOR SOLUTION INTRAVENOUS at 17:29

## 2023-08-26 RX ADMIN — CARVEDILOL 25 MG: 12.5 TABLET, FILM COATED ORAL at 08:21

## 2023-08-26 RX ADMIN — LEVALBUTEROL HYDROCHLORIDE 1.25 MG: 1.25 SOLUTION RESPIRATORY (INHALATION) at 13:17

## 2023-08-26 RX ADMIN — LEVALBUTEROL HYDROCHLORIDE 1.25 MG: 1.25 SOLUTION RESPIRATORY (INHALATION) at 07:49

## 2023-08-26 RX ADMIN — GABAPENTIN 600 MG: 300 CAPSULE ORAL at 08:21

## 2023-08-26 RX ADMIN — SERTRALINE HYDROCHLORIDE 50 MG: 50 TABLET ORAL at 08:21

## 2023-08-26 NOTE — PROGRESS NOTES
Katty Maharaj is a 69 y.o. female who is currently ordered Vancomycin IV with management by the Pharmacy Consult service. Relevant clinical data and objective / subjective history reviewed. Vancomycin Assessment:  Indication and Goal AUC/Trough: Pneumonia (goal -600, trough >10)  Clinical Status: New  Micro:     Renal Function:  SCr: 0.83 mg/dL  CrCl: 53.2 mL/min  Renal replacement: Not on dialysis   Days of Therapy: 1  Current Dose: 1500 mg IV once for loading dose  Vancomycin Plan:  New Dosin mg IV every 12 hours  Estimated AUC:  992 mcg*hr/mL  Estimated Trough:  15 mcg/mL  Next Level:  at 0600  Renal Function Monitoring: Daily BMP and [place] will continue to follow closely for s/sx of nephrotoxicity, infusion reactions and appropriateness of therapy. BMP and CBC will be ordered per protocol. We will continue to follow the patient’s culture results and clinical progress daily.     Joshua Baig, Pharmacist

## 2023-08-26 NOTE — ASSESSMENT & PLAN NOTE
Likely non-MI troponin elevation secondary to pulmonary embolism versus sepsis   Troponin 73, 60, 76  EKG sinus tachycardia 106 no ST-T wave changes  Denies chest pain, palpitations    Plan-  Continue to monitor  Monitor on telemetry

## 2023-08-26 NOTE — ASSESSMENT & PLAN NOTE
CTA chest PE- equivocal embolus in the posterior basal segment of the left lower lung, mucous plugging in the left mainstem bronchus with volume loss in the left lower lobe and left upper lobe, aspiration pneumonia not excluded, Dilatation of pulmonary artery which is indicative of chronic pulmonary hypertension, emphysema, mediastinal hilar adenopathy concerning for neoplasm, Enlarging T12 lesion  ED consulted pulmonology who recommended n.p.o. at midnight    Plan-  Appreciate Pulmonology recommendations  Xopenex, Atrovent, DuoNebs  Incentive spirometry

## 2023-08-26 NOTE — ASSESSMENT & PLAN NOTE
Concern for Sepsis POA as evidenced by tachycardia 124, leukocytosis 17.9  Suspected source: Pneumonia with mucous plug    No results for input(s): "LACTICACID" in the last 72 hours. CT CAP- mucous plugging in left mainstem bronchus with volume loss in left lower lobe/left upper lobe  Chest x-ray-increased cardiomegaly, left basilar infiltrate    Plan-  Procal-pending  Lactate-pending  Ceftriaxone and azithromycin  Pro-Telly in a.m.   Sputum and Gram stain-pending  Blood culture-pending  Strep pneumo-pending  Legionella-pending  Gentle IVF NS 75 cc

## 2023-08-26 NOTE — CONSULTS
Consult Note - Pulmonary   Burke Gonzalez 69 y.o. female MRN: [id number]  Unit/Bed#: ICU 12 Encounter: [id number]      Reason for consultation: mucus plugging, pulmonary embolism, mediastinal lymphadenopathy    Requesting physician: Travis William DO    Assessment:    1. Acute hypoxic respiratory failure- secondary to presumed pneumonia  2. Multifocal pneumonia  3. Paraseptal and centrilobular emphysema- predominate upper lobe  4. Left lung dense consolidation- with left distal mainstem mucus plugging  5. Mediastinal lymphadenopathy- in particular the right hilum  6. RLL consolidation  7. Questionable pulmonary embolism- reported in the posterior basal segment left lower lobe  8. Tobacco abuse- 1ppd for 30 years    Plan:    · F/up sputum culture  · Continue cefepime and azithro, recommend adding vanc  · Check MRSA nares  · Pulmonary hygiene, flutter valve  · Start hypertonic nebs  · Continue atrovent/xopenex, hold off on ICS  · Wean supp o2 as able  · Repeat CXR tomorrow am, if mucous plugging worsens, may need bronchoscopy but hold off for now  · Questionable PE however this appears to be artifactual. Check lower extremity dopplers  · Would discontinue heparin even if small subsegmental PE unless LE dopplers are (+) for DVT  · Counseled on smoking cessation    History of Present Illness   HPI:  Burke Gonzalez is a 69 y.o. female who has a PMH of tobacco abuse, CKD III, HTN who presents with shortness of breath and cough. She reports that this all began after having COVID in July and reports that during this time, she also had strep throat. She has a productive cough of tan/pink secretions. She denies pleuritic chest pain. She reports night sweats and weight loss. She has smoked 1 ppd for 30 years and continues to smoke. She is a retired . Her family history is positive for lung cancer in her mother. Review of Systems   Constitutional: Negative for chills and fever.    HENT: Negative for congestion, postnasal drip and rhinorrhea. Eyes: Negative for itching. Respiratory: Positive for cough and shortness of breath. Negative for wheezing and stridor. Cardiovascular: Negative for chest pain, palpitations and leg swelling. Gastrointestinal: Negative for abdominal distention, abdominal pain, nausea and vomiting. Genitourinary: Negative for dysuria and flank pain. Musculoskeletal: Negative for arthralgias and myalgias. Skin: Negative for color change. Neurological: Negative for dizziness, light-headedness and headaches. Psychiatric/Behavioral: Negative.           Historical Information   Past Medical History:   Diagnosis Date   • Anxiety    • Depression    • Fatty liver    • Hyperlipidemia    • Hypertension    • Psychiatric disorder    • Varicella      Past Surgical History:   Procedure Laterality Date   • BREAST SURGERY Bilateral     Reduction Procedure   • CARDIAC SURGERY     •  SECTION      x4   • DILATION AND CURETTAGE OF UTERUS     • ECTOPIC PREGNANCY SURGERY       Family History   Problem Relation Age of Onset   • Lung cancer Mother    • Cirrhosis Father    • Hypertension Sister    • Bipolar disorder Sister    • Heart disease Sister    • Diabetes Family    • Heart disease Family    • Hypertension Family    • Stroke Family    • Thyroid disease Family        Occupational History: retired     Social History: smoking 1ppd for 30 years, denies drugs or alcohol abuse    Meds/Allergies   Current Facility-Administered Medications   Medication Dose Route Frequency   • acetaminophen (TYLENOL) tablet 650 mg  650 mg Oral Q6H PRN   • amLODIPine (NORVASC) tablet 10 mg  10 mg Oral Daily   • azithromycin (ZITHROMAX) 500 mg in sodium chloride 0.9 % 250 mL IVPB  500 mg Intravenous Q24H   • busPIRone (BUSPAR) tablet 30 mg  30 mg Oral TID   • carvedilol (COREG) tablet 25 mg  25 mg Oral BID With Meals   • ceftriaxone (ROCEPHIN) 1 g/50 mL in dextrose IVPB  1,000 mg Intravenous Q24H • famotidine (PEPCID) tablet 20 mg  20 mg Oral Daily   • fluticasone (FLONASE) 50 mcg/act nasal spray 2 spray  2 spray Each Nare Daily   • furosemide (LASIX) injection 40 mg  40 mg Intravenous Once   • gabapentin (NEURONTIN) capsule 600 mg  600 mg Oral TID   • heparin (porcine) 25,000 units in 0.45% NaCl 250 mL infusion (premix)  3-30 Units/kg/hr (Order-Specific) Intravenous Titrated   • heparin (porcine) injection 3,000 Units  3,000 Units Intravenous Q6H PRN   • heparin (porcine) injection 6,000 Units  6,000 Units Intravenous Q6H PRN   • ipratropium (ATROVENT) 0.02 % inhalation solution 0.5 mg  0.5 mg Nebulization TID   • ipratropium-albuterol (DUO-NEB) 0.5-2.5 mg/3 mL inhalation solution 3 mL  3 mL Nebulization Q6H PRN   • levalbuterol (XOPENEX) inhalation solution 1.25 mg  1.25 mg Nebulization TID   • lisinopril (ZESTRIL) tablet 10 mg  10 mg Oral Daily   • loratadine (CLARITIN) tablet 10 mg  10 mg Oral Daily   • nicotine (NICODERM CQ) 14 mg/24hr TD 24 hr patch 1 patch  1 patch Transdermal Daily   • ondansetron (ZOFRAN) injection 4 mg  4 mg Intravenous Q6H PRN   • oxyCODONE (ROXICODONE) IR tablet 5 mg  5 mg Oral BID PRN   • polyethylene glycol (MIRALAX) packet 17 g  17 g Oral Daily PRN   • sertraline (ZOLOFT) tablet 50 mg  50 mg Oral Daily     Medications Prior to Admission   Medication   • acetaminophen (TYLENOL) 650 mg CR tablet   • albuterol (PROVENTIL HFA,VENTOLIN HFA) 90 mcg/act inhaler   • amLODIPine (NORVASC) 10 mg tablet   • ASPIRIN-ACETAMINOPHEN-CAFFEINE PO   • busPIRone (BUSPAR) 30 MG tablet   • Calcium Carb-Cholecalciferol (OSCAL-D) 500 mg-200 units per tablet   • carbamide peroxide (DEBROX) 6.5 % otic solution   • carvedilol (COREG) 25 mg tablet   • diclofenac sodium (VOLTAREN) 1 %   • famotidine (PEPCID) 20 mg tablet   • fluticasone (FLONASE) 50 mcg/act nasal spray   • gabapentin (NEURONTIN) 600 MG tablet   • lisinopril (ZESTRIL) 5 mg tablet   • loratadine (CLARITIN) 10 mg tablet   • neomycin-polymyxin-hydrocortisone (CORTISPORIN) 0.35%-10,000 units/mL-1% otic suspension   • NON FORMULARY   • oxyCODONE (OXY-IR) 5 MG capsule   • sertraline (ZOLOFT) 50 mg tablet   • diphenhydrAMINE (BENADRYL) 25 mg tablet   • EPINEPHrine (EPIPEN) 0.3 mg/0.3 mL SOAJ   • naloxone (NARCAN) 4 mg/0.1 mL nasal spray     Allergies   Allergen Reactions   • Methyldopa Shortness Of Breath and Other (See Comments)     Aldomet       Vitals:    08/26/23 0400 08/26/23 0500 08/26/23 0600 08/26/23 0751   BP:    (!) 172/99   BP Location:    Left arm   Pulse: 105 101 105 (!) 109   Resp: (!) 31 (!) 36 (!) 28 (!) 25   Temp:    98.8 °F (37.1 °C)   TempSrc:    Axillary   SpO2: 90% (!) 87% 94% 93%   Weight:       Height:           Physical Exam  Constitutional:       General: She is not in acute distress. Appearance: She is not diaphoretic. HENT:      Head: Normocephalic and atraumatic. Nose: Nose normal.      Mouth/Throat:      Pharynx: No oropharyngeal exudate. Eyes:      General: No scleral icterus. Conjunctiva/sclera: Conjunctivae normal.      Pupils: Pupils are equal, round, and reactive to light. Neck:      Thyroid: No thyromegaly. Vascular: No JVD. Trachea: No tracheal deviation. Cardiovascular:      Rate and Rhythm: Normal rate and regular rhythm. Heart sounds: Normal heart sounds. No murmur heard. No friction rub. No gallop. Pulmonary:      Effort: Pulmonary effort is normal. No respiratory distress. Breath sounds: No stridor. No wheezing or rales. Comments: Diminished at the left base with scattered bilateral rhonchi  Abdominal:      General: Bowel sounds are normal. There is no distension. Palpations: Abdomen is soft. Tenderness: There is no abdominal tenderness. There is no guarding or rebound. Musculoskeletal:         General: No deformity. Normal range of motion. Cervical back: Normal range of motion and neck supple.    Lymphadenopathy:      Cervical: No cervical adenopathy. Skin:     General: Skin is warm. Findings: No erythema or rash. Neurological:      Mental Status: She is alert and oriented to person, place, and time. Cranial Nerves: No cranial nerve deficit. Sensory: No sensory deficit. Labs: I have personally reviewed pertinent lab results. Results from last 7 days   Lab Units 08/26/23  0511 08/25/23  1157   WBC Thousand/uL 14.17* 17.92*   HEMOGLOBIN g/dL 14.0 15.2   HEMATOCRIT % 44.1 47.1*   PLATELETS Thousands/uL 276 291   NEUTROS PCT % 80* 85*   MONOS PCT % 9 6   EOS PCT % 0 1      Results from last 7 days   Lab Units 08/26/23  0511 08/25/23  1157   POTASSIUM mmol/L 5.0 4.0   CHLORIDE mmol/L 108 107   CO2 mmol/L 27 24   BUN mg/dL 11 15   CREATININE mg/dL 0.83 1.01   CALCIUM mg/dL 9.1 9.9   ALK PHOS U/L  --  78   ALT U/L  --  6*   AST U/L  --  14     Results from last 7 days   Lab Units 08/26/23  0511   MAGNESIUM mg/dL 1.9          Results from last 7 days   Lab Units 08/26/23  0751 08/26/23  0107 08/25/23  1339   INR   --   --  0.97   PTT seconds 64* 89* 28     Results from last 7 days   Lab Units 08/25/23  2108   LACTIC ACID mmol/L 0.6     0   Lab Value Date/Time    TROPONINI 0.05 (H) 07/10/2020 0942    TROPONINI 0.05 (H) 07/10/2020 0546    TROPONINI 0.06 (H) 07/10/2020 0313       Imaging and other studies: I have personally reviewed pertinent reports. and I have personally reviewed pertinent films in PACS    Pulmonary function testing: none on file    EKG, Pathology, and Other Studies: I have personally reviewed pertinent reports.    and I have personally reviewed pertinent films in PACS    Code Status: Level 1 - Full Code      Sil Contreras MD  Pulmonary & [id number] [facility]

## 2023-08-26 NOTE — ASSESSMENT & PLAN NOTE
Lab Results   Component Value Date    EGFR 72 08/26/2023    EGFR 56 08/25/2023    EGFR 37 (L) 01/24/2023    CREATININE 0.83 08/26/2023    CREATININE 1.01 08/25/2023    CREATININE 1.50 (H) 01/24/2023   Creatinine baseline appears to be around 1.5  Does not appear to be in acute kidney injury at this time    Plan-  Monitor I's/O  Monitor BMP  Avoid nephrotoxins  Avoid hypotension

## 2023-08-26 NOTE — ASSESSMENT & PLAN NOTE
Wt Readings from Last 3 Encounters:   08/25/23 78.7 kg (173 lb 8 oz)   08/11/23 74.8 kg (165 lb)   07/11/23 71.7 kg (158 lb)   Patient actually complains of recent significant weight loss  Euvolemic on exam  Lung findings on imaging leans more towards a clinical picture of pneumonia rather than CHF exacerbation  Last echo was in 2021 EF 16%, grade 1 diastolic dysfunction    Plan-  Continue to monitor volume status  Consider repeat Echo  Monitor on telemetry in the setting of pulmonary embolism

## 2023-08-26 NOTE — ASSESSMENT & PLAN NOTE
BP on admission 178/92  Home regimen includes amlodipine 10, Coreg 25 BID, lisinopril 10qd    Plan-  Continue home regimen

## 2023-08-26 NOTE — ASSESSMENT & PLAN NOTE
Lab Results   Component Value Date    EGFR 56 08/25/2023    EGFR 37 (L) 01/24/2023    EGFR 36 (L) 08/11/2022    CREATININE 1.01 08/25/2023    CREATININE 1.50 (H) 01/24/2023    CREATININE 1.56 (H) 08/11/2022   Creatinine baseline appears to be around 1.5  Does not appear to be in acute kidney injury at this time    Plan-  Monitor I's/O  Monitor BMP  Avoid nephrotoxins  Avoid hypotension

## 2023-08-26 NOTE — ASSESSMENT & PLAN NOTE
CTA chest PE- equivocal embolus in the posterior basal segment of the left lower lung, mucous plugging in the left mainstem bronchus with volume loss in the left lower lobe and left upper lobe, aspiration pneumonia not excluded, Dilatation of pulmonary artery which is indicative of chronic pulmonary hypertension, emphysema, mediastinal hilar adenopathy concerning for neoplasm, Enlarging T12 lesion  ED consulted pulmonology who recommended n.p.o. at midnight    Plan-  Appreciate Pulmonology recommendations  Pulmonology holding off on endoscopic intervention at this time  Xopenex, Atrovent, DuoNebs  Incentive spirometry  Airway clearance protocol  Flutter valve

## 2023-08-26 NOTE — PLAN OF CARE
Problem: Potential for Falls  Goal: Patient will remain free of falls  Description: INTERVENTIONS:  - Educate patient/family on patient safety including physical limitations  - Instruct patient to call for assistance with activity   - Consult OT/PT to assist with strengthening/mobility   - Keep Call bell within reach  - Keep bed low and locked with side rails adjusted as appropriate  - Keep care items and personal belongings within reach  - Initiate and maintain comfort rounds  - Make Fall Risk Sign visible to staff  - Offer Toileting every 2 Hours, in advance of need  - Initiate/Maintain bed alarm  - Obtain necessary fall risk management equipment:   - Apply yellow socks and bracelet for high fall risk patients  - Consider moving patient to room near nurses station  Outcome: Progressing     Problem: MOBILITY - ADULT  Goal: Maintain or return to baseline ADL function  Description: INTERVENTIONS:  -  Assess patient's ability to carry out ADLs; assess patient's baseline for ADL function and identify physical deficits which impact ability to perform ADLs (bathing, care of mouth/teeth, toileting, grooming, dressing, etc.)  - Assess/evaluate cause of self-care deficits   - Assess range of motion  - Assess patient's mobility; develop plan if impaired  - Assess patient's need for assistive devices and provide as appropriate  - Encourage maximum independence but intervene and supervise when necessary  - Involve family in performance of ADLs  - Assess for home care needs following discharge   - Consider OT consult to assist with ADL evaluation and planning for discharge  - Provide patient education as appropriate  Outcome: Progressing  Goal: Maintains/Returns to pre admission functional level  Description: INTERVENTIONS:  - Perform BMAT or MOVE assessment daily.   - Set and communicate daily mobility goal to care team and patient/family/caregiver.    - Collaborate with rehabilitation services on mobility goals if consulted  - Perform Range of Motion  times a day. - Reposition patient every 2 hours.   - Dangle patient  times a day  - Stand patient  times a day  - Ambulate patient  times a day  - Out of bed to chair  times a day   - Out of bed for meals  times a day  - Out of bed for toileting  - Record patient progress and toleration of activity level   Outcome: Progressing

## 2023-08-26 NOTE — PROGRESS NOTES
[facility] [facility]  Progress Note  Name: Inna Waggoner  MRN: [id number]  Unit/Bed#: ICU 16 I Date of Admission: 8/25/2023   Date of Service: 8/26/2023 I Hospital Day: 1    Assessment/Plan   * Sepsis [facility])  Assessment & Plan  Concern for Sepsis POA as evidenced by tachycardia 124, leukocytosis 17.9  Suspected source: Pneumonia with mucous plug    Recent Labs     08/25/23  2108   LACTICACID 0.6     CT CAP- mucous plugging in left mainstem bronchus with volume loss in left lower lobe/left upper lobe  Chest x-ray-increased cardiomegaly, left basilar infiltrate  Procal wnl  Lactate wnl  Legionella strep pneumo antigen negative    Plan-  Ceftriaxone and azithromycin  Sputum and Gram stain-pending  Blood culture-pending  We will discontinue IV fluids at this time due to concern for possible pulmonary congestion and edema  We will give one-time dose of IV Lasix 40 mg today      Abnormal computed tomography angiography (CTA)  Assessment & Plan  CTA chest PE- equivocal embolus in the posterior basal segment of the left lower lung, mucous plugging in the left mainstem bronchus with volume loss in the left lower lobe and left upper lobe, aspiration pneumonia not excluded, Dilatation of pulmonary artery which is indicative of chronic pulmonary hypertension, emphysema, mediastinal hilar adenopathy concerning for neoplasm, Enlarging T12 lesion  ED consulted pulmonology who recommended n.p.o. at midnight    Plan-  Appreciate Pulmonology recommendations  Pulmonology holding off on endoscopic intervention at this time  Xopenex, Atrovent, DuoNebs  Incentive spirometry  Airway clearance protocol  Flutter valve        Pulmonary embolism ([address])  Assessment & Plan  Initially presenting due to worsening shortness of breath and productive cough  D-dimer 0.99    CTA chest PE study-equivocal embolus posterior basal segment of the left lower lung  CT findings also suspicious for possible neoplasm in the setting of mediastinal/hilar adenopathy  ED started patient on heparin drip  Patient has no history of DVT or PE, family history of clotting disorders, patient notes that she is up-to-date with all screenings  Current tobacco user 1 pack/day    Plan-  Continue heparin drip  Appreciate pulmonology recommendations  Will eventually  require a malignancy work-up        (HFpEF) heart failure with preserved ejection fraction (HCC)  Assessment & Plan  Wt Readings from Last 3 Encounters:   08/25/23 79.3 kg (174 lb 13.2 oz)   08/11/23 74.8 kg (165 lb)   07/11/23 71.7 kg (158 lb)   Patient actually complains of recent significant weight loss  Euvolemic on exam  Lung findings on imaging leans more towards a clinical picture of pneumonia rather than CHF exacerbation  Last echo was in 2021 EF 45%, grade 1 diastolic dysfunction    Plan-  Give one dose of IV Lasix 40 mg today  Discontinue fluids  Continue to monitor volume status  Consider repeat Echo  Monitor on telemetry in the setting of pulmonary embolism        Acute respiratory failure with hypoxia (HCC)  Assessment & Plan  Complaining of shortness of breath and productive cough that has been progressively worsening over the past month  Prior to arrival she was severely short of breath at rest which prompted her to come into the ED  On arrival patient was saturating 84% on room air then escalated to 15 L and ultimately a nonrebreather      Plan-  See sepsis secondary to pneumonia      Stage 3a chronic kidney disease [facility])  Assessment & Plan  Lab Results   Component Value Date    EGFR 72 08/26/2023    EGFR 56 08/25/2023    EGFR 37 (L) 01/24/2023    CREATININE 0.83 08/26/2023    CREATININE 1.01 08/25/2023    CREATININE 1.50 (H) 01/24/2023   Creatinine baseline appears to be around 1.5  Does not appear to be in acute kidney injury at this time    Plan-  Monitor I's/O  Monitor BMP  Avoid nephrotoxins  Avoid hypotension    Elevated troponin  Assessment & Plan  Likely non-MI troponin elevation secondary to pulmonary embolism versus sepsis   Troponin 73, 60, 76  EKG sinus tachycardia 106 no ST-T wave changes  Denies chest pain, palpitations    Plan-  Continue to monitor  Monitor on telemetry    Benign essential hypertension  Assessment & Plan  BP on admission 178/92  Home regimen includes amlodipine 10, Coreg 25 BID, lisinopril 10qd    Plan-  Continue home regimen           VTE Pharmacologic Prophylaxis: VTE Score: 6 High Risk (Score >/= 5) - Pharmacological DVT Prophylaxis Ordered: heparin drip. Sequential Compression Devices Ordered. Patient Centered Rounds: I performed bedside rounds with nursing staff today. Discussions with Specialists or Other Care Team Provider: Attending    Education and Discussions with Family / Patient: Updated  (daughter) via phone. Current Length of Stay: 1 day(s)  Current Patient Status: Inpatient   Discharge Plan: Anticipate discharge in 48-72 hrs to home. Code Status: Level 1 - Full Code    Subjective:   Patient seen and examined at the bedside this morning. No acute events overnight. Her main concern this morning was not having a diet. Denies any fevers or chills. She continues to have some cough productive of small amounts of mucus and blood. She states her breathing has improved while on nasal cannula. Denies any significant chest pain or pleurisy. Objective:     Vitals:   Temp (24hrs), Av.5 °F (36.9 °C), Min:98.3 °F (36.8 °C), Max:98.8 °F (37.1 °C)    Temp:  [98.3 °F (36.8 °C)-98.8 °F (37.1 °C)] 98.8 °F (37.1 °C)  HR:  [] 109  Resp:  [14-42] 25  BP: (147-177)/() 172/99  SpO2:  [82 %-99 %] 93 %  Body mass index is 33.03 kg/m². Input and Output Summary (last 24 hours): Intake/Output Summary (Last 24 hours) at 2023 1150  Last data filed at 2023 0601  Gross per 24 hour   Intake 1290.74 ml   Output 1200 ml   Net 90.74 ml       Physical Exam:   Physical Exam  Vitals and nursing note reviewed. Constitutional:       General: She is not in acute distress. Appearance: She is well-developed. She is ill-appearing. She is not toxic-appearing or diaphoretic. HENT:      Head: Normocephalic and atraumatic. Right Ear: External ear normal.      Left Ear: External ear normal.      Nose: Nose normal.      Mouth/Throat:      Mouth: Mucous membranes are dry. Eyes:      Extraocular Movements: Extraocular movements intact. Conjunctiva/sclera: Conjunctivae normal.      Pupils: Pupils are equal, round, and reactive to light. Cardiovascular:      Rate and Rhythm: Regular rhythm. Tachycardia present. Pulses: Normal pulses. Heart sounds: Normal heart sounds. No murmur heard. No friction rub. No gallop. Pulmonary:      Effort: Pulmonary effort is normal. No respiratory distress. Breath sounds: No stridor. Rhonchi present. No wheezing. Chest:      Chest wall: No tenderness. Abdominal:      General: Bowel sounds are normal. There is no distension. Palpations: Abdomen is soft. Tenderness: There is no abdominal tenderness. There is no right CVA tenderness, left CVA tenderness, guarding or rebound. Musculoskeletal:         General: No tenderness. Normal range of motion. Cervical back: Normal range of motion and neck supple. Right lower leg: No edema. Left lower leg: No edema. Skin:     General: Skin is warm and dry. Capillary Refill: Capillary refill takes less than 2 seconds. Neurological:      General: No focal deficit present. Mental Status: She is alert and oriented to person, place, and time. Cranial Nerves: No cranial nerve deficit. Motor: No weakness. Coordination: Coordination normal.   Psychiatric:         Mood and Affect: Mood normal.         Behavior: Behavior normal.         Thought Content:  Thought content normal.          Additional Data:     Labs:  Results from last 7 days   Lab Units 08/26/23  0511   WBC Thousand/uL 14.17*   HEMOGLOBIN g/dL 14.0   HEMATOCRIT % 44.1   PLATELETS Thousands/uL 276   NEUTROS PCT % 80*   LYMPHS PCT % 10*   MONOS PCT % 9   EOS PCT % 0     Results from last 7 days   Lab Units 08/26/23  0511 08/25/23  1157   SODIUM mmol/L 139 139   POTASSIUM mmol/L 5.0 4.0   CHLORIDE mmol/L 108 107   CO2 mmol/L 27 24   BUN mg/dL 11 15   CREATININE mg/dL 0.83 1.01   ANION GAP mmol/L 4 8   CALCIUM mg/dL 9.1 9.9   ALBUMIN g/dL  --  3.6   TOTAL BILIRUBIN mg/dL  --  0.31   ALK PHOS U/L  --  78   ALT U/L  --  6*   AST U/L  --  14   GLUCOSE RANDOM mg/dL 96 131     Results from last 7 days   Lab Units 08/25/23  1339   INR  0.97             Results from last 7 days   Lab Units 08/26/23  0511 08/25/23 2109 08/25/23  2108   LACTIC ACID mmol/L  --   --  0.6   PROCALCITONIN ng/ml 0.15 0.17  --        Lines/Drains:  Invasive Devices     Peripheral Intravenous Line  Duration           Peripheral IV 08/25/23 Distal;Left;Ventral (anterior) Forearm 1 day    Peripheral IV 08/25/23 Right Antecubital <1 day                  Telemetry:  Telemetry Orders (From admission, onward)             24 Hour Telemetry Monitoring  Continuous x 24 Hours (Telem)        Question:  Reason for 24 Hour Telemetry  Answer:  Pulmonary Embolism                 Telemetry Reviewed: Normal Sinus Rhythm  Indication for Continued Telemetry Use: Arrthymias requiring medical therapy             Imaging: Reviewed radiology reports from this admission including: chest xray and chest CT scan    Recent Cultures (last 7 days):   Results from last 7 days   Lab Units 08/25/23 2108 08/25/23 2107   BLOOD CULTURE   --  Received in Microbiology Lab. Culture in Progress. Received in Microbiology Lab. Culture in Progress.    LEGIONELLA URINARY ANTIGEN  Negative  --        Last 24 Hours Medication List:   Current Facility-Administered Medications   Medication Dose Route Frequency Provider Last Rate   • acetaminophen  650 mg Oral Q6H PRN Keagan Magallon MD     • amLODIPine  10 mg Oral Daily Mauricio Marie MD     • azithromycin  500 mg Intravenous Q24H Mauricio Marie MD Stopped (08/25/23 2346)   • busPIRone  30 mg Oral TID Mauricio Marie MD     • carvedilol  25 mg Oral BID With Meals Mauricio Marie MD     • cefTRIAXone  1,000 mg Intravenous Q24H Mauricio Marie MD     • famotidine  20 mg Oral Daily Mauricio Marie MD     • fluticasone  2 spray Each Nare Daily Mauricio Marie MD     • furosemide  40 mg Intravenous Once Soledad Kruse DO     • gabapentin  600 mg Oral TID Mauricio Marie MD     • heparin (porcine)  3-30 Units/kg/hr (Order-Specific) Intravenous Titrated Mauricio Marie MD 18 Units/kg/hr (08/25/23 1840)   • heparin (porcine)  3,000 Units Intravenous Q6H PRN Lillian Albe, DO     • heparin (porcine)  6,000 Units Intravenous Q6H PRN Lillian Albe, DO     • ipratropium  0.5 mg Nebulization TID Mauricio Marie MD     • ipratropium-albuterol  3 mL Nebulization Q6H PRN Mauricio Marie MD     • levalbuterol  1.25 mg Nebulization TID Bandar Ryan MD     • lisinopril  10 mg Oral Daily Mauricio Marie MD     • loratadine  10 mg Oral Daily Mauricio Marie MD     • nicotine  1 patch Transdermal Daily Mauricio Marie MD     • ondansetron  4 mg Intravenous Q6H PRN Mauricio Marie MD     • oxyCODONE  5 mg Oral BID PRN Vira Webb MD     • polyethylene glycol  17 g Oral Daily PRN Mauricio Marie MD     • sertraline  50 mg Oral Daily Mauricio Marie MD          Today, Patient Was Seen By: Lukas Matos DO    **Please Note: This note may have been constructed using a voice recognition system. **

## 2023-08-26 NOTE — ASSESSMENT & PLAN NOTE
Initially presenting due to worsening shortness of breath and productive cough  D-dimer 0.99    CTA chest PE study-equivocal embolus posterior basal segment of the left lower lung  CT findings also suspicious for possible neoplasm in the setting of mediastinal/hilar adenopathy  ED started patient on heparin drip  Patient has no history of DVT or PE, family history of clotting disorders, patient notes that she is up-to-date with all screenings  Current tobacco user 1 pack/day    Plan-  Continue heparin drip  Appreciate pulmonology recommendations  Will eventually  require a malignancy work-up

## 2023-08-26 NOTE — H&P
[address] [facility]  H&P  Name: Jaron Vazquez 69 y.o. female I MRN: [id number]  Unit/Bed#: ICU 12 I Date of Admission: 8/25/2023   Date of Service: 8/25/2023 I Hospital Day: 0      Assessment/Plan   * Sepsis [facility])  Assessment & Plan  Concern for Sepsis POA as evidenced by tachycardia 124, leukocytosis 17.9  Suspected source: Pneumonia with mucous plug    No results for input(s): "LACTICACID" in the last 72 hours. CT CAP- mucous plugging in left mainstem bronchus with volume loss in left lower lobe/left upper lobe  Chest x-ray-increased cardiomegaly, left basilar infiltrate    Plan-  Procal-pending  Lactate-pending  Ceftriaxone and azithromycin  Pro-Telly in a.m.   Sputum and Gram stain-pending  Blood culture-pending  Strep pneumo-pending  Legionella-pending  Gentle IVF NS 75 cc      Acute respiratory failure with hypoxia (HCC)  Assessment & Plan  Complaining of shortness of breath and productive cough that has been progressively worsening over the past month  Prior to arrival she was severely short of breath at rest which prompted her to come into the ED  On arrival patient was saturating 84% on room air then escalated to 15 L and ultimately a nonrebreather      Plan-  See sepsis secondary to pneumonia      Abnormal computed tomography angiography (CTA)  Assessment & Plan  CTA chest PE- equivocal embolus in the posterior basal segment of the left lower lung, mucous plugging in the left mainstem bronchus with volume loss in the left lower lobe and left upper lobe, aspiration pneumonia not excluded, Dilatation of pulmonary artery which is indicative of chronic pulmonary hypertension, emphysema, mediastinal hilar adenopathy concerning for neoplasm, Enlarging T12 lesion  ED consulted pulmonology who recommended n.p.o. at midnight    Plan-  Appreciate Pulmonology recommendations  Xopenex, Atrovent, DuoNebs  Incentive spirometry        Pulmonary embolism [facility])  Assessment & Plan  Initially presenting due to worsening shortness of breath and productive cough  D-dimer 0.99    CTA chest PE study-equivocal embolus posterior basal segment of the left lower lung  CT findings also suspicious for possible neoplasm in the setting of mediastinal/hilar adenopathy  ED started patient on heparin drip  Patient has no history of DVT or PE, family history of clotting disorders, patient notes that she is up-to-date with all screenings  Current tobacco user 1 pack/day    Plan-  Continue heparin drip  Appreciate pulmonology recommendations  Will eventually  require a malignancy work-up        (HFpEF) heart failure with preserved ejection fraction ([address])  Assessment & Plan  Wt Readings from Last 3 Encounters:   08/25/23 78.7 kg (173 lb 8 oz)   08/11/23 74.8 kg (165 lb)   07/11/23 71.7 kg (158 lb)   Patient actually complains of recent significant weight loss  Euvolemic on exam  Lung findings on imaging leans more towards a clinical picture of pneumonia rather than CHF exacerbation  Last echo was in 2021 EF 21%, grade 1 diastolic dysfunction    Plan-  Continue to monitor volume status  Consider repeat Echo  Monitor on telemetry in the setting of pulmonary embolism        Stage 3a chronic kidney disease [facility])  Assessment & Plan  Lab Results   Component Value Date    EGFR 56 08/25/2023    EGFR 37 (L) 01/24/2023    EGFR 36 (L) 08/11/2022    CREATININE 1.01 08/25/2023    CREATININE 1.50 (H) 01/24/2023    CREATININE 1.56 (H) 08/11/2022   Creatinine baseline appears to be around 1.5  Does not appear to be in acute kidney injury at this time    Plan-  Monitor I's/O  Monitor BMP  Avoid nephrotoxins  Avoid hypotension    Elevated troponin  Assessment & Plan  Likely non-MI troponin elevation secondary to pulmonary embolism versus sepsis   Troponin 73, 60, 76  EKG sinus tachycardia 106 no ST-T wave changes  Denies chest pain, palpitations    Plan-  Continue to monitor  Monitor on telemetry    Benign essential hypertension  Assessment & Plan  BP on admission 178/92  Home regimen includes amlodipine 10, Coreg 25 BID, lisinopril 10qd    Plan-  Continue home regimen       VTE Pharmacologic Prophylaxis: VTE Score: 6 High Risk (Score >/= 5) - Pharmacological DVT Prophylaxis Ordered: heparin drip. Sequential Compression Devices Ordered. Code Status: Level 1 - Full Code   Discussion with family: Updated  (daughter) at bedside. Anticipated Length of Stay: Patient will be admitted on an inpatient basis with an anticipated length of stay of greater than 2 midnights secondary to Sepsis secondary to pneumonia and pulmonary embolism. Chief Complaint: Shortness of breath    History of Present Illness:  Ahmad Alpers is a 69 y.o. female with a PMH of tobacco abuse, HFpEF, CKD stage III, hypertension, hyperlipidemia, who presents with progressively worsening shortness of breath with productive cough as well as congestion. Patient reports this has been ongoing for at least a month and has progressively worsened. She has been seeing her PCP who attributed her symptoms to a COVID infection in July and diagnosed her with long COVID. Patient also reports that since April she has had significant weight loss of around 60 pounds as well as experiencing night sweats. Patient denies fevers, chills, chest pain, palpitations, abdominal pain, nausea, vomiting, diarrhea, urinary symptoms, calf pain, difficulty swallowing, hemoptysis, or any other symptoms at this time.     ED-    vitals afebrile, tachycardic 124, 178/92, initially 84% on room air then had increasing oxygen requirements escalated to a nonrebreather  CBC remarkable for leukocytosis 17.9 with neutrophil predominance  CMP unremarkable  D-dimer 0.99  Troponin 73, 60, 76    COVID/flu/RSV-negative  CTA chest PE-equivocal embolus posterior basal segment left lower lobe, mucous plugging in the left mainstem bronchus with volume loss in the left lower lung and left upper lung, aspiration pneumonia is not excluded, dilatation of the pulmonary artery which may be indicative of chronic pulmonary hypertension, emphysema, mediastinal/hilar adenopathy concerning for neoplasm, enlarging T12 lesion   chest x-ray-increased cardiomegaly, left basilar infiltrate  ED started patient on heparin drip, consulted pulmonology who recommended n.p.o. at midnight, and gave 1 dose of ceftriaxone patient was admitted for sepsis secondary to pneumonia as well as pulmonary embolism and mucous plug      Review of Systems:  Review of Systems   Constitutional: Positive for unexpected weight change. Negative for chills, diaphoresis and fever. HENT: Positive for congestion. Negative for ear pain, facial swelling, hearing loss, sinus pressure, sinus pain, sore throat, trouble swallowing and voice change. Eyes: Negative for pain and visual disturbance. Respiratory: Positive for cough and shortness of breath. Negative for apnea, choking, chest tightness and wheezing. Cardiovascular: Negative for chest pain, palpitations and leg swelling. Gastrointestinal: Negative for abdominal pain, constipation, diarrhea and vomiting. Genitourinary: Negative for difficulty urinating, dysuria, flank pain, frequency, hematuria and urgency. Musculoskeletal: Negative for arthralgias and back pain. Skin: Negative for color change and rash. Neurological: Negative for dizziness, tremors, seizures, syncope, weakness, light-headedness, numbness and headaches. Psychiatric/Behavioral: Negative for agitation and confusion. All other systems reviewed and are negative.       Past Medical and Surgical History:   Past Medical History:   Diagnosis Date   • Anxiety    • Depression    • Fatty liver    • Hyperlipidemia    • Hypertension    • Psychiatric disorder    • Varicella        Past Surgical History:   Procedure Laterality Date   • BREAST SURGERY Bilateral     Reduction Procedure   • CARDIAC SURGERY     •  SECTION      x4   • DILATION AND CURETTAGE OF UTERUS     • ECTOPIC PREGNANCY SURGERY         Meds/Allergies:  Prior to Admission medications    Medication Sig Start Date End Date Taking? Authorizing Provider   acetaminophen (TYLENOL) 650 mg CR tablet Take 1 tablet (650 mg total) by mouth every 8 (eight) hours as needed for moderate pain 8/20/21  Yes Drinda Claude, MD   albuterol (PROVENTIL HFA,VENTOLIN HFA) 90 mcg/act inhaler Inhale 2 puffs every 6 (six) hours as needed for wheezing or shortness of breath 6/24/23  Yes Sandip Ibrahim PA-C   amLODIPine (NORVASC) 10 mg tablet TAKE 1 TABLET BY MOUTH EVERY DAY 6/22/23  Yes Lisa Diehl MD   ASPIRIN-ACETAMINOPHEN-CAFFEINE PO Take by mouth   Yes Historical Provider, MD   busPIRone (BUSPAR) 30 MG tablet Take 1 tablet (30 mg total) by mouth 3 (three) times a day 6/22/23 9/20/23 Yes Lisa Diehl MD   Calcium Carb-Cholecalciferol (OSCAL-D) 500 mg-200 units per tablet Take 1 tablet by mouth 2 (two) times a day with meals   Yes Historical Provider, MD   carbamide peroxide (DEBROX) 6.5 % otic solution Administer 5 drops into both ears 2 (two) times a day 5/18/23  Yes Lisa Diehl MD   carvedilol (COREG) 25 mg tablet TAKE 1 TABLET BY MOUTH TWICE A DAY WITH FOOD 7/6/23  Yes Lisa Diehl MD   diclofenac sodium (VOLTAREN) 1 % APPLY 2 GRAMS TO AFFECTED AREA 4 TIMES A DAY 10/22/20  Yes Lisa Diehl MD   famotidine (PEPCID) 20 mg tablet Take 1 tablet (20 mg total) by mouth 2 (two) times a day 1/5/23  Yes Lisa Diehl MD   fluticasone (FLONASE) 50 mcg/act nasal spray SPRAY 2 SPRAYS INTO EACH NOSTRIL EVERY DAY 8/3/23  Yes Emerald Macedo MD   gabapentin (NEURONTIN) 600 MG tablet Take 1 tablet (600 mg total) by mouth 3 (three) times a day 6/12/23  Yes Lisa Diehl MD   lisinopril (ZESTRIL) 5 mg tablet TAKE 1 TABLET (5 MG TOTAL) BY MOUTH DAILY.   Patient taking differently: Take 10 mg by mouth daily Taking 2 tablets 6/22/23  Yes MD aylin Meza (CLARITIN) 10 mg tablet Take 1 tablet (10 mg total) by mouth daily 7/11/23  Yes Kristina Marin MD   neomycin-polymyxin-hydrocortisone (CORTISPORIN) 0.35%-10,000 units/mL-1% otic suspension Administer 4 drops into the left ear 3 (three) times a day 6/24/23  Yes Sandip Ibrahim PA-C   NON FORMULARY Hempvana roll on cream for pain   Yes Historical Provider, MD   oxyCODONE (OXY-IR) 5 MG capsule Take 1 capsule (5 mg total) by mouth 2 (two) times a day as needed for moderate pain Max Daily Amount: 10 mg 8/9/23  Yes Kaleigh Dewey MD   sertraline (ZOLOFT) 50 mg tablet TAKE 1 TABLET BY MOUTH EVERY DAY 6/5/23  Yes Kaleigh Dewey MD   diphenhydrAMINE (BENADRYL) 25 mg tablet Take 1 tablet (25 mg total) by mouth every 6 (six) hours as needed for itching for up to 5 days 7/10/20 10/22/21  Fabiano Tidwell MD   EPINEPHrine (EPIPEN) 0.3 mg/0.3 mL SOAJ Inject 0.3 mL (0.3 mg total) into a muscle once for 1 dose 3/28/22 8/11/23  Kaleigh Dewey MD   naloxone (NARCAN) 4 mg/0.1 mL nasal spray Administer 1 spray into a nostril. If no response after 2-3 minutes, give another dose in the other nostril using a new spray. 1/5/23   Kaleigh Dewey MD   aspirin (ECOTRIN LOW STRENGTH) 81 mg EC tablet Take 81 mg by mouth daily  8/25/23  Historical Provider, MD   methocarbamol (ROBAXIN) 500 mg tablet Take 1 tablet (500 mg total) by mouth daily at bedtime as needed for muscle spasms 1/5/23 8/25/23  Kaleigh Dewey MD     I have reviewed home medications with patient personally. Allergies:    Allergies   Allergen Reactions   • Methyldopa Shortness Of Breath and Other (See Comments)     Aldomet       Social History:  Marital Status:    Occupation: Retired  Patient Pre-hospital Living Situation: Home  Patient Pre-hospital Level of Mobility: walks  Patient Pre-hospital Diet Restrictions: None  Substance Use History:   Social History     Substance and Sexual Activity   Alcohol Use Not Currently     Social History     Tobacco Use   Smoking Status Every Day   • Packs/day: 1.00   • Types: Cigarettes   Smokeless Tobacco Never   Tobacco Comments    2 Black and milds per day     Social History     Substance and Sexual Activity   Drug Use Yes   • Types: Marijuana    Comment: for pain       Family History:  Family History   Problem Relation Age of Onset   • Lung cancer Mother    • Cirrhosis Father    • Hypertension Sister    • Bipolar disorder Sister    • Heart disease Sister    • Diabetes Family    • Heart disease Family    • Hypertension Family    • Stroke Family    • Thyroid disease Family        Physical Exam:     Vitals:   Blood Pressure: 147/69 (08/25/23 1800)  Pulse: 104 (08/25/23 1800)  Temperature: 98.4 °F (36.9 °C) (08/25/23 1133)  Temp Source: Oral (08/25/23 1133)  Respirations: 22 (08/25/23 1800)  Height: 5' 1" (154.9 cm) (08/25/23 1330)  Weight - Scale: 78.7 kg (173 lb 8 oz) (08/25/23 1330)  SpO2: 93 % (08/25/23 1800)    Physical Exam  Vitals and nursing note reviewed. Constitutional:       General: She is in acute distress. Appearance: She is well-developed. She is not toxic-appearing or diaphoretic. HENT:      Head: Normocephalic and atraumatic. Mouth/Throat:      Mouth: Mucous membranes are dry. Eyes:      Extraocular Movements: Extraocular movements intact. Conjunctiva/sclera: Conjunctivae normal.      Pupils: Pupils are equal, round, and reactive to light. Cardiovascular:      Rate and Rhythm: Regular rhythm. Tachycardia present. Pulses: Normal pulses. Heart sounds: Normal heart sounds. No murmur heard. No friction rub. No gallop. Pulmonary:      Effort: Pulmonary effort is normal. No respiratory distress. Breath sounds: No stridor. Rhonchi present. No wheezing. Comments: Decreased breath sounds on the left  Chest:      Chest wall: No tenderness. Abdominal:      General: Bowel sounds are normal. There is no distension. Palpations: Abdomen is soft. Tenderness: There is no abdominal tenderness. There is no right CVA tenderness, left CVA tenderness, guarding or rebound. Musculoskeletal:         General: No tenderness. Normal range of motion. Cervical back: Normal range of motion and neck supple. Right lower leg: No edema. Left lower leg: No edema. Skin:     General: Skin is warm and dry. Capillary Refill: Capillary refill takes less than 2 seconds. Neurological:      General: No focal deficit present. Mental Status: She is alert and oriented to person, place, and time. Cranial Nerves: No cranial nerve deficit. Motor: No weakness. Coordination: Coordination normal.   Psychiatric:         Mood and Affect: Mood normal.         Behavior: Behavior normal.         Thought Content: Thought content normal.          Additional Data:     Lab Results:  Results from last 7 days   Lab Units 08/25/23  1157   WBC Thousand/uL 17.92*   HEMOGLOBIN g/dL 15.2   HEMATOCRIT % 47.1*   PLATELETS Thousands/uL 291   NEUTROS PCT % 85*   LYMPHS PCT % 6*   MONOS PCT % 6   EOS PCT % 1     Results from last 7 days   Lab Units 08/25/23  1157   SODIUM mmol/L 139   POTASSIUM mmol/L 4.0   CHLORIDE mmol/L 107   CO2 mmol/L 24   BUN mg/dL 15   CREATININE mg/dL 1.01   ANION GAP mmol/L 8   CALCIUM mg/dL 9.9   ALBUMIN g/dL 3.6   TOTAL BILIRUBIN mg/dL 0.31   ALK PHOS U/L 78   ALT U/L 6*   AST U/L 14   GLUCOSE RANDOM mg/dL 131     Results from last 7 days   Lab Units 08/25/23  1339   INR  0.97                   Lines/Drains:  Invasive Devices     Peripheral Intravenous Line  Duration           Peripheral IV 08/25/23 Distal;Left;Ventral (anterior) Forearm <1 day    Peripheral IV 08/25/23 Right Antecubital <1 day                    Imaging: Reviewed radiology reports from this admission including: chest xray and chest CT scan  CTA ED chest PE Study   Final Result by Vinod Hanna MD (08/25 4533)      Limited study.       There is equivocal embolus in the posterior basal segment left lower lobe. Other smaller segments are difficult to assess due to motion and vascular crowding. There is mucous plugging in the left mainstem bronchus with volume loss in portions of the left lower lobe and left upper lobe. Aspiration pneumonia is not excluded. There is dilatation of the main pulmonary artery and proximal pulmonary segment suggesting pulmonary hypertension this is more likely chronic than acute given the degree of distention. Emphysema is present. There is significant mediastinal and hilar adenopathy suggesting underlying neoplasm more likely than simple reactive nodes. Enlarging lesion at T12 is again demonstrated of uncertain etiology. This has been present since 2013. Perhaps a plasmacytoma is a consideration. Neoplastic work-up is advised. Pulmonology consultation is also advised to assess endobronchial obstruction on the left. This was discussed with resident physician Dr. Keila Cherry at 4:58 p.m. Follow-up was marked in the epic system      Workstation performed: [id number]         XR chest 1 view portable   Final Result by Shavon Rasheed MD (08/25 1222)   Increased cardiomegaly. Development of CHF. Probable left basal infiltrate         Workstation performed: [id number]             EKG and Other Studies Reviewed on Admission:   · EKG: Sinus Tachycardia. .    ** Please Note: This note has been constructed using a voice recognition system.  **

## 2023-08-26 NOTE — ASSESSMENT & PLAN NOTE
Concern for Sepsis POA as evidenced by tachycardia 124, leukocytosis 17.9  Suspected source: Pneumonia with mucous plug    Recent Labs     08/25/23  2108   LACTICACID 0.6     CT CAP- mucous plugging in left mainstem bronchus with volume loss in left lower lobe/left upper lobe  Chest x-ray-increased cardiomegaly, left basilar infiltrate  Procal wnl  Lactate wnl  Legionella strep pneumo antigen negative    Plan-  Ceftriaxone and azithromycin  Sputum and Gram stain-pending  Blood culture-pending  We will discontinue IV fluids at this time due to concern for possible pulmonary congestion and edema  We will give one-time dose of IV Lasix 40 mg today

## 2023-08-26 NOTE — ASSESSMENT & PLAN NOTE
Wt Readings from Last 3 Encounters:   08/25/23 79.3 kg (174 lb 13.2 oz)   08/11/23 74.8 kg (165 lb)   07/11/23 71.7 kg (158 lb)   Patient actually complains of recent significant weight loss  Euvolemic on exam  Lung findings on imaging leans more towards a clinical picture of pneumonia rather than CHF exacerbation  Last echo was in 2021 EF 41%, grade 1 diastolic dysfunction    Plan-  Give one dose of IV Lasix 40 mg today  Discontinue fluids  Continue to monitor volume status  Consider repeat Echo  Monitor on telemetry in the setting of pulmonary embolism

## 2023-08-26 NOTE — ASSESSMENT & PLAN NOTE
Complaining of shortness of breath and productive cough that has been progressively worsening over the past month  Prior to arrival she was severely short of breath at rest which prompted her to come into the ED  On arrival patient was saturating 84% on room air then escalated to 15 L and ultimately a nonrebreather      Plan-  See sepsis secondary to pneumonia

## 2023-08-27 ENCOUNTER — APPOINTMENT (INPATIENT)
Dept: RADIOLOGY | Facility: HOSPITAL | Age: 68
DRG: 871 | End: 2023-08-27
Payer: COMMERCIAL

## 2023-08-27 ENCOUNTER — APPOINTMENT (INPATIENT)
Dept: VASCULAR ULTRASOUND | Facility: HOSPITAL | Age: 68
DRG: 871 | End: 2023-08-27
Payer: COMMERCIAL

## 2023-08-27 LAB
ANION GAP SERPL CALCULATED.3IONS-SCNC: 8 MMOL/L
APTT PPP: 67 SECONDS (ref 23–37)
ATRIAL RATE: 103 BPM
BUN SERPL-MCNC: 10 MG/DL (ref 5–25)
CALCIUM SERPL-MCNC: 9.3 MG/DL (ref 8.4–10.2)
CHLORIDE SERPL-SCNC: 104 MMOL/L (ref 96–108)
CO2 SERPL-SCNC: 27 MMOL/L (ref 21–32)
CREAT SERPL-MCNC: 0.77 MG/DL (ref 0.6–1.3)
ERYTHROCYTE [DISTWIDTH] IN BLOOD BY AUTOMATED COUNT: 15.6 % (ref 11.6–15.1)
GFR SERPL CREATININE-BSD FRML MDRD: 79 ML/MIN/1.73SQ M
GLUCOSE SERPL-MCNC: 95 MG/DL (ref 65–140)
HCT VFR BLD AUTO: 47.2 % (ref 34.8–46.1)
HGB BLD-MCNC: 14.5 G/DL (ref 11.5–15.4)
MCH RBC QN AUTO: 31.5 PG (ref 26.8–34.3)
MCHC RBC AUTO-ENTMCNC: 30.7 G/DL (ref 31.4–37.4)
MCV RBC AUTO: 103 FL (ref 82–98)
P AXIS: 82 DEGREES
PLATELET # BLD AUTO: 256 THOUSANDS/UL (ref 149–390)
PMV BLD AUTO: 9.1 FL (ref 8.9–12.7)
POTASSIUM SERPL-SCNC: 3.6 MMOL/L (ref 3.5–5.3)
PR INTERVAL: 176 MS
PROCALCITONIN SERPL-MCNC: 0.13 NG/ML
QRS AXIS: 99 DEGREES
QRSD INTERVAL: 88 MS
QT INTERVAL: 348 MS
QTC INTERVAL: 455 MS
RBC # BLD AUTO: 4.6 MILLION/UL (ref 3.81–5.12)
SODIUM SERPL-SCNC: 139 MMOL/L (ref 135–147)
T WAVE AXIS: 80 DEGREES
VENTRICULAR RATE: 103 BPM
WBC # BLD AUTO: 10.73 THOUSAND/UL (ref 4.31–10.16)

## 2023-08-27 PROCEDURE — 99232 SBSQ HOSP IP/OBS MODERATE 35: CPT | Performed by: INTERNAL MEDICINE

## 2023-08-27 PROCEDURE — 93970 EXTREMITY STUDY: CPT | Performed by: SURGERY

## 2023-08-27 PROCEDURE — 85730 THROMBOPLASTIN TIME PARTIAL: CPT | Performed by: INTERNAL MEDICINE

## 2023-08-27 PROCEDURE — 93010 ELECTROCARDIOGRAM REPORT: CPT | Performed by: INTERNAL MEDICINE

## 2023-08-27 PROCEDURE — 94640 AIRWAY INHALATION TREATMENT: CPT

## 2023-08-27 PROCEDURE — 85027 COMPLETE CBC AUTOMATED: CPT

## 2023-08-27 PROCEDURE — 93970 EXTREMITY STUDY: CPT

## 2023-08-27 PROCEDURE — 84145 PROCALCITONIN (PCT): CPT

## 2023-08-27 PROCEDURE — 94760 N-INVAS EAR/PLS OXIMETRY 1: CPT

## 2023-08-27 PROCEDURE — 80048 BASIC METABOLIC PNL TOTAL CA: CPT

## 2023-08-27 PROCEDURE — 71045 X-RAY EXAM CHEST 1 VIEW: CPT

## 2023-08-27 RX ORDER — ALBUTEROL SULFATE 90 UG/1
2 AEROSOL, METERED RESPIRATORY (INHALATION) EVERY 4 HOURS PRN
Status: DISCONTINUED | OUTPATIENT
Start: 2023-08-27 | End: 2023-08-30 | Stop reason: HOSPADM

## 2023-08-27 RX ADMIN — CARVEDILOL 25 MG: 12.5 TABLET, FILM COATED ORAL at 08:27

## 2023-08-27 RX ADMIN — FLUTICASONE PROPIONATE 2 SPRAY: 50 SPRAY, METERED NASAL at 08:28

## 2023-08-27 RX ADMIN — OXYCODONE HYDROCHLORIDE 5 MG: 5 TABLET ORAL at 21:17

## 2023-08-27 RX ADMIN — LEVALBUTEROL HYDROCHLORIDE 1.25 MG: 1.25 SOLUTION RESPIRATORY (INHALATION) at 13:07

## 2023-08-27 RX ADMIN — BUSPIRONE HYDROCHLORIDE 30 MG: 10 TABLET ORAL at 16:14

## 2023-08-27 RX ADMIN — GABAPENTIN 600 MG: 300 CAPSULE ORAL at 16:14

## 2023-08-27 RX ADMIN — AMLODIPINE BESYLATE 10 MG: 5 TABLET ORAL at 08:27

## 2023-08-27 RX ADMIN — IPRATROPIUM BROMIDE 0.5 MG: 0.5 SOLUTION RESPIRATORY (INHALATION) at 13:07

## 2023-08-27 RX ADMIN — SODIUM CHLORIDE SOLN NEBU 3% 4 ML: 3 NEBU SOLN at 19:51

## 2023-08-27 RX ADMIN — FAMOTIDINE 20 MG: 20 TABLET ORAL at 08:27

## 2023-08-27 RX ADMIN — CEFTRIAXONE SODIUM 1000 MG: 10 INJECTION, POWDER, FOR SOLUTION INTRAVENOUS at 14:00

## 2023-08-27 RX ADMIN — BUSPIRONE HYDROCHLORIDE 30 MG: 10 TABLET ORAL at 08:27

## 2023-08-27 RX ADMIN — LORATADINE 10 MG: 10 TABLET ORAL at 08:27

## 2023-08-27 RX ADMIN — OXYCODONE HYDROCHLORIDE 5 MG: 5 TABLET ORAL at 08:28

## 2023-08-27 RX ADMIN — LEVALBUTEROL HYDROCHLORIDE 1.25 MG: 1.25 SOLUTION RESPIRATORY (INHALATION) at 19:51

## 2023-08-27 RX ADMIN — SODIUM CHLORIDE SOLN NEBU 3% 4 ML: 3 NEBU SOLN at 13:07

## 2023-08-27 RX ADMIN — HEPARIN SODIUM 18 UNITS/KG/HR: 10000 INJECTION, SOLUTION INTRAVENOUS at 05:57

## 2023-08-27 RX ADMIN — IPRATROPIUM BROMIDE 0.5 MG: 0.5 SOLUTION RESPIRATORY (INHALATION) at 07:12

## 2023-08-27 RX ADMIN — SERTRALINE HYDROCHLORIDE 50 MG: 50 TABLET ORAL at 08:27

## 2023-08-27 RX ADMIN — BUSPIRONE HYDROCHLORIDE 30 MG: 10 TABLET ORAL at 21:17

## 2023-08-27 RX ADMIN — AZITHROMYCIN MONOHYDRATE 500 MG: 500 INJECTION, POWDER, LYOPHILIZED, FOR SOLUTION INTRAVENOUS at 20:54

## 2023-08-27 RX ADMIN — IPRATROPIUM BROMIDE 0.5 MG: 0.5 SOLUTION RESPIRATORY (INHALATION) at 19:51

## 2023-08-27 RX ADMIN — CARVEDILOL 25 MG: 12.5 TABLET, FILM COATED ORAL at 16:15

## 2023-08-27 RX ADMIN — GABAPENTIN 600 MG: 300 CAPSULE ORAL at 21:17

## 2023-08-27 RX ADMIN — LISINOPRIL 10 MG: 10 TABLET ORAL at 08:27

## 2023-08-27 RX ADMIN — GABAPENTIN 600 MG: 300 CAPSULE ORAL at 08:26

## 2023-08-27 RX ADMIN — VANCOMYCIN HYDROCHLORIDE 1500 MG: 10 INJECTION, POWDER, LYOPHILIZED, FOR SOLUTION INTRAVENOUS at 10:58

## 2023-08-27 RX ADMIN — LEVALBUTEROL HYDROCHLORIDE 1.25 MG: 1.25 SOLUTION RESPIRATORY (INHALATION) at 07:12

## 2023-08-27 NOTE — PROGRESS NOTES
[address] [facility]  Progress Note  Name: Mike Ardon  MRN: [id number]  Unit/Bed#: ICU 16 I Date of Admission: 8/25/2023   Date of Service: 8/27/2023 I Hospital Day: 2    Assessment/Plan   * Sepsis [facility])  Assessment & Plan  Concern for Sepsis POA as evidenced by tachycardia 124, leukocytosis 17.9  Suspected source: Pneumonia with mucous plug    Recent Labs     08/25/23  2108   LACTICACID 0.6     CT CAP- mucous plugging in left mainstem bronchus with volume loss in left lower lobe/left upper lobe  Chest x-ray-increased cardiomegaly, left basilar infiltrate  Procal- Neg   Lactate- neg   Legionella strep pneumo antigen negative  Sputum culture-1+ gram-negative rods, no polys  Blood culture-negative  S/P Lasix 40 IV 1 dose yesterday, in setting of possible fluid overload    Plan-  Ceftriaxone and azithromycin Day 3  Added vancomycin yesterday  Repeat chest x-ray-pending  Appreciate pulmonology recommendations      Acute respiratory failure with hypoxia (HCC)  Assessment & Plan  Complaining of shortness of breath and productive cough that has been progressively worsening over the past month  Prior to arrival she was severely short of breath at rest which prompted her to come into the ED  On arrival patient was saturating 84% on room air then escalated to 15 L and ultimately a nonrebreather      Plan-  See sepsis secondary to pneumonia  Continue to wean as appropriate      Abnormal computed tomography angiography (CTA)  Assessment & Plan  CTA chest PE- equivocal embolus in the posterior basal segment of the left lower lung, mucous plugging in the left mainstem bronchus with volume loss in the left lower lobe and left upper lobe, aspiration pneumonia not excluded, Dilatation of pulmonary artery which is indicative of chronic pulmonary hypertension, emphysema, mediastinal hilar adenopathy concerning for neoplasm, Enlarging T12 lesion    Plan-  Appreciate Pulmonology recommendations-recommending cefepime, azithromycin, vancomycin, follow-up on MRSA culture, pulmonary hygiene, flutter valve, repeat chest x-ray if worsening may need bronchoscopy we will hold off as of yesterday  Recommend discontinuing heparin drip unless Doppler is positive  Hypertonic nebulizers, Atrovent/Xopenex, hold off on ICS  Xopenex, Atrovent, DuoNebs  Airway clearance protocol  Flutter valve        Pulmonary embolism (HCC)  Assessment & Plan  Initially presenting due to worsening shortness of breath and productive cough  D-dimer 0.99    CTA chest PE study-equivocal embolus posterior basal segment of the left lower lung  CT findings also suspicious for possible neoplasm in the setting of mediastinal/hilar adenopathy  ED started patient on heparin drip  Patient has no history of DVT or PE, family history of clotting disorders, patient notes that she is up-to-date with all screenings  Current tobacco user 1 pack/day    Plan-  Continue heparin drip  Appreciate pulmonology recommendations-May discontinue heparin drip if VAS US is negative  Will eventually  require a malignancy work-up  VAS US-pending        (HFpEF) heart failure with preserved ejection fraction (HCC)  Assessment & Plan  Wt Readings from Last 3 Encounters:   08/27/23 76.1 kg (167 lb 12.3 oz)   08/11/23 74.8 kg (165 lb)   07/11/23 71.7 kg (158 lb)   Patient actually complains of recent significant weight loss  Euvolemic on exam  Lung findings on imaging leans more towards a clinical picture of pneumonia rather than CHF exacerbation  Last echo was in 2021 EF 55%, grade 1 diastolic dysfunction    Plan-  Give one dose of IV Lasix 40 mg yesterday  Off IVF  Continue to monitor volume status  Consider repeat Echo  Monitor on telemetry in the setting of pulmonary embolism        Stage 3a chronic kidney disease [facility])  Assessment & Plan  Lab Results   Component Value Date    EGFR 79 08/27/2023    EGFR 72 08/26/2023    EGFR 56 08/25/2023    CREATININE 0.77 08/27/2023    CREATININE 0.83 2023    CREATININE 1.01 2023   Creatinine baseline appears to be around 1.5  Does not appear to be in acute kidney injury at this time    Plan-  Monitor I's/O  Monitor BMP  Avoid nephrotoxins  Avoid hypotension    Elevated troponin  Assessment & Plan  Likely non-MI troponin elevation secondary to pulmonary embolism versus sepsis   Troponin 73, 60, 76  EKG sinus tachycardia 106 no ST-T wave changes  Denies chest pain, palpitations    Plan-  Continue to monitor  Monitor on telemetry    Benign essential hypertension  Assessment & Plan  BP on admission 149/87  Home regimen includes amlodipine 10, Coreg 25 BID, lisinopril 10qd    Plan-  Continue home regimen         VTE Pharmacologic Prophylaxis: VTE Score: 6 High Risk (Score >/= 5) - Pharmacological DVT Prophylaxis Ordered: heparin drip. Sequential Compression Devices Ordered. Patient Centered Rounds: I performed bedside rounds with nursing staff today. Discussions with Specialists or Other Care Team Provider: Pulmonology    Education and Discussions with Family / Patient: Updated  (daughter) via phone. Current Length of Stay: 2 day(s)  Current Patient Status: Inpatient   Discharge Plan: Anticipate discharge in 48 hrs to discharge location to be determined pending rehab evaluations. Code Status: Level 1 - Full Code    Subjective:   Patient was seen and examined at the bedside. Patient was resting comfortably in no overt acute distress. Patient reports she feels significantly better from admission. Patient reports she is still experiencing some shortness of breath however is nothing compared to when she first came in. Patient denies chest pain, palpitations, fever, chills, abdominal pain, NVD, urinary symptoms, calf pain, or any other symptoms at this time.     Objective:     Vitals:   Temp (24hrs), Av.4 °F (36.9 °C), Min:98.2 °F (36.8 °C), Max:98.6 °F (37 °C)    Temp:  [98.2 °F (36.8 °C)-98.6 °F (37 °C)] 98.2 °F (36.8 °C)  HR: [84-97] 89  Resp:  [19-31] 19  BP: (139-162)/() 162/92  SpO2:  [90 %-96 %] 93 %  Body mass index is 31.7 kg/m². Input and Output Summary (last 24 hours): Intake/Output Summary (Last 24 hours) at 8/27/2023 0838  Last data filed at 8/27/2023 0601  Gross per 24 hour   Intake 1022.76 ml   Output 1900 ml   Net -877.24 ml       Physical Exam:   Physical Exam  Vitals and nursing note reviewed. Constitutional:       General: She is not in acute distress. Appearance: She is well-developed. She is obese. She is not ill-appearing, toxic-appearing or diaphoretic. HENT:      Head: Normocephalic and atraumatic. Mouth/Throat:      Mouth: Mucous membranes are moist.   Eyes:      Extraocular Movements: Extraocular movements intact. Conjunctiva/sclera: Conjunctivae normal.      Pupils: Pupils are equal, round, and reactive to light. Cardiovascular:      Rate and Rhythm: Regular rhythm. Tachycardia present. Pulses: Normal pulses. Heart sounds: Normal heart sounds. No friction rub. No gallop. Pulmonary:      Effort: Pulmonary effort is normal. No respiratory distress. Breath sounds: Normal breath sounds. No wheezing, rhonchi or rales. Comments: Increased aeration overall, decreased breath sounds on the left  Chest:      Chest wall: No tenderness. Abdominal:      General: Bowel sounds are normal. There is no distension. Palpations: Abdomen is soft. Tenderness: There is no abdominal tenderness. There is no right CVA tenderness or left CVA tenderness. Musculoskeletal:         General: No tenderness. Normal range of motion. Cervical back: Normal range of motion and neck supple. Right lower leg: No edema. Left lower leg: No edema. Skin:     General: Skin is warm and dry. Capillary Refill: Capillary refill takes less than 2 seconds. Neurological:      General: No focal deficit present.       Mental Status: She is alert and oriented to person, place, and time. Cranial Nerves: No cranial nerve deficit. Sensory: No sensory deficit. Motor: No weakness. Coordination: Coordination normal.   Psychiatric:         Mood and Affect: Mood normal.         Behavior: Behavior normal.         Thought Content: Thought content normal.          Additional Data:     Labs:  Results from last 7 days   Lab Units 23  0442 23  0511   WBC Thousand/uL 10.73* 14.17*   HEMOGLOBIN g/dL 14.5 14.0   HEMATOCRIT % 47.2* 44.1   PLATELETS Thousands/uL 256 276   NEUTROS PCT %  --  80*   LYMPHS PCT %  --  10*   MONOS PCT %  --  9   EOS PCT %  --  0     Results from last 7 days   Lab Units 23  0442 23  0511 23  1157   SODIUM mmol/L 139   < > 139   POTASSIUM mmol/L 3.6   < > 4.0   CHLORIDE mmol/L 104   < > 107   CO2 mmol/L 27   < > 24   BUN mg/dL 10   < > 15   CREATININE mg/dL 0.77   < > 1.01   ANION GAP mmol/L 8   < > 8   CALCIUM mg/dL 9.3   < > 9.9   ALBUMIN g/dL  --   --  3.6   TOTAL BILIRUBIN mg/dL  --   --  0.31   ALK PHOS U/L  --   --  78   ALT U/L  --   --  6*   AST U/L  --   --  14   GLUCOSE RANDOM mg/dL 95   < > 131    < > = values in this interval not displayed.      Results from last 7 days   Lab Units 23  1339   INR  0.97             Results from last 7 days   Lab Units 23  0511 23  2109 23  2108   LACTIC ACID mmol/L  --   --  0.6   PROCALCITONIN ng/ml 0.15 0.17  --        Lines/Drains:  Invasive Devices     Peripheral Intravenous Line  Duration           Peripheral IV 23 Distal;Left;Ventral (anterior) Forearm 1 day    Peripheral IV 23 Right Antecubital 1 day                  Telemetry:  Telemetry Orders (From admission, onward)             24 Hour Telemetry Monitoring  Continuous x 24 Hours (Telem)           Question:  Reason for 24 Hour Telemetry  Answer:  Pulmonary Embolism                 Telemetry Reviewed: Normal Sinus Rhythm  Indication for Continued Telemetry Use: PE Imaging: Reviewed radiology reports from this admission including: chest xray, chest CT scan and ultrasound(s)    Recent Cultures (last 7 days):   Results from last 7 days   Lab Units 08/25/23 2222 08/25/23 2108 08/25/23 2107   BLOOD CULTURE   --   --  No Growth at 24 hrs. No Growth at 24 hrs.    GRAM STAIN RESULT  No Epithelial cells seen*  No polys seen*  1+ Gram negative rods*  --   --    LEGIONELLA URINARY ANTIGEN   --  Negative  --        Last 24 Hours Medication List:   Current Facility-Administered Medications   Medication Dose Route Frequency Provider Last Rate   • acetaminophen  650 mg Oral Q6H PRN Miller Milan MD     • amLODIPine  10 mg Oral Daily Miller Milan MD     • azithromycin  500 mg Intravenous Q24H Miller Milan MD Stopped (08/26/23 2239)   • busPIRone  30 mg Oral TID Miller Milan MD     • carvedilol  25 mg Oral BID With Meals Miller Milan MD     • cefTRIAXone  1,000 mg Intravenous Q24H Miller Milan MD 1,000 mg (08/26/23 1430)   • famotidine  20 mg Oral Daily Miller Milan MD     • fluticasone  2 spray Each Nare Daily Miller Milan MD     • gabapentin  600 mg Oral TID Miller Milan MD     • heparin (porcine)  3-30 Units/kg/hr (Order-Specific) Intravenous Titrated Miller Milan MD 18 Units/kg/hr (08/27/23 0557)   • heparin (porcine)  3,000 Units Intravenous Q6H PRN Zach Olivas DO     • heparin (porcine)  6,000 Units Intravenous Q6H PRN Zach Olivas DO     • ipratropium  0.5 mg Nebulization TID Miller Milan MD     • ipratropium-albuterol  3 mL Nebulization Q6H PRN Miller Milan MD     • levalbuterol  1.25 mg Nebulization TID Lico Flor MD     • lisinopril  10 mg Oral Daily Miller Milan MD     • loratadine  10 mg Oral Daily Miller Milan MD     • nicotine  1 patch Transdermal Daily Miller Milan MD     • ondansetron  4 mg Intravenous Q6H PRN Miller Milan MD     • oxyCODONE  5 mg Oral BID PRN Talia Cm MD     • polyethylene glycol  17 g Oral Daily PRN Mckenzie uQeen MD     • sertraline  50 mg Oral Daily Mckenzie Queen MD     • sodium chloride  4 mL Nebulization TID Iram Ardon MD     • vancomycin  750 mg Intravenous Q12H Hanxiong Helyn Shone, DO Stopped (08/27/23 0044)        Today, Patient Was Seen By: Mckenzie Queen MD    **Please Note: This note may have been constructed using a voice recognition system. **

## 2023-08-27 NOTE — ASSESSMENT & PLAN NOTE
Lab Results   Component Value Date    EGFR 79 08/27/2023    EGFR 72 08/26/2023    EGFR 56 08/25/2023    CREATININE 0.77 08/27/2023    CREATININE 0.83 08/26/2023    CREATININE 1.01 08/25/2023   Creatinine baseline appears to be around 1.5  Does not appear to be in acute kidney injury at this time    Plan-  Monitor I's/O  Monitor BMP  Avoid nephrotoxins  Avoid hypotension

## 2023-08-27 NOTE — ASSESSMENT & PLAN NOTE
Concern for Sepsis POA as evidenced by tachycardia 124, leukocytosis 17.9  Suspected source: Pneumonia with mucous plug    Recent Labs     08/25/23 2108   LACTICACID 0.6     CT CAP- mucous plugging in left mainstem bronchus with volume loss in left lower lobe/left upper lobe  Chest x-ray-increased cardiomegaly, left basilar infiltrate  Procal- Neg   Lactate- neg   Legionella strep pneumo antigen negative  Sputum culture-1+ gram-negative rods, no polys  Blood culture-negative  S/P Lasix 40 IV 1 dose yesterday, in setting of possible fluid overload    Plan-  Ceftriaxone and azithromycin Day 3  Added vancomycin yesterday  Repeat chest x-ray-pending  Appreciate pulmonology recommendations

## 2023-08-27 NOTE — PROGRESS NOTES
Daya Harrison is a 69 y.o. female who is currently ordered Vancomycin IV with management by the Pharmacy Consult service. Relevant clinical data and objective / subjective history reviewed. Vancomycin Assessment:  Indication and Goal AUC/Trough: Pneumonia (goal -600, trough >10)  Micro:     1. Renal Function:  · SCr: 0.77 mg/dL  · CrCl: 63 mL/min  · Renal replacement: Not on dialysis  Days of Therapy: 2  Current Dose: 750 mg IV every 12 hours  Vancomycin Plan:  New Dosin mg IV every 24 hours  Estimated AUC: 455 mcg*hr/mL  Estimated Trough: 12.1 mcg/mL  Next Level:  at 0600  Renal Function Monitoring: Daily BMP and [place] will continue to follow closely for s/sx of nephrotoxicity, infusion reactions and appropriateness of therapy. BMP and CBC will be ordered per protocol. We will continue to follow the patient’s culture results and clinical progress daily.     Mirtha Sibley

## 2023-08-27 NOTE — ASSESSMENT & PLAN NOTE
Wt Readings from Last 3 Encounters:   08/27/23 76.1 kg (167 lb 12.3 oz)   08/11/23 74.8 kg (165 lb)   07/11/23 71.7 kg (158 lb)   Patient actually complains of recent significant weight loss  Euvolemic on exam  Lung findings on imaging leans more towards a clinical picture of pneumonia rather than CHF exacerbation  Last echo was in 2021 EF 21%, grade 1 diastolic dysfunction    Plan-  Give one dose of IV Lasix 40 mg yesterday  Off IVF  Continue to monitor volume status  Consider repeat Echo  Monitor on telemetry in the setting of pulmonary embolism

## 2023-08-27 NOTE — DISCHARGE SUMMARY
[address] [facility]  Discharge- Poike 1955, 69 y.o. female MRN: [id number]  Unit/Bed#: W -01 Encounter: [id number]  Primary Care Provider: Bharti Norris MD   Date and time admitted to hospital: 8/25/2023 11:34 AM    * Sepsis [facility])  Assessment & Plan  Concern for Sepsis POA as evidenced by tachycardia 124, leukocytosis 17.9  Suspected source: Pneumonia with mucous plug    CT CAP- mucous plugging in left mainstem bronchus with volume loss in left lower lobe/left upper lobe  Chest x-ray-increased cardiomegaly, left basilar infiltrate  Procal- Neg   Lactate- neg   Legionella strep pneumo antigen negative  Sputum culture-1+ gram-negative rods, no polys  Blood culture-negative  S/P Lasix 40 IV 1 dose yesterday, in setting of possible fluid overload    Plan-  Ceftriaxone day 6/7.   Once discharged, initiate cefdinir BID x 1 day to complete antibiotic dose        Acute respiratory failure with hypoxia (HCC)  Assessment & Plan  Complaining of shortness of breath and productive cough that has been progressively worsening over the past month  Prior to arrival she was severely short of breath at rest which prompted her to come into the ED  On arrival patient was saturating 84% on room air then escalated to 15 L and ultimately a nonrebreather      Plan-  See sepsis secondary to pneumonia  Pt currently on RA  Atrovent/Xopenex, hold off ICS  High frequency chest wall oscillation, flutter valve therapy  Begin prednisone 40 mg daily (day 3 of steroids)  Discharge on trelegy per pulmonology   Repeat CT chest 6-8 weeks        Abnormal computed tomography angiography (CTA)  Assessment & Plan  CTA chest PE- equivocal embolus in the posterior basal segment of the left lower lung, mucous plugging in the left mainstem bronchus with volume loss in the left lower lobe and left upper lobe, aspiration pneumonia not excluded, Dilatation of pulmonary artery which is indicative of chronic pulmonary hypertension, emphysema, mediastinal hilar adenopathy concerning for neoplasm, Enlarging T12 lesion    CXRAY 8/27/23: Stable volume loss on the left secondary to left lower lobe atelectasis. Plan-  -Appreciate Pulmonology recommendations: pulmonary hygiene, flutter valve. - Holding off on broncoscopy as of now. - Atrovent/Xopenex, hold off on ICS  -Airway clearance protocol          Vaginal itching  Assessment & Plan  Patient developed vulvar itching on 8/29/23. No urinary symptoms including burning or frequency. Plan:  Discharge patient on miconazole cream    Pulmonary embolism ([address])  Assessment & Plan  Initially presenting due to worsening shortness of breath and productive cough  D-dimer 0.99    CTA chest PE study-equivocal embolus posterior basal segment of the left lower lung  CT findings also suspicious for possible neoplasm in the setting of mediastinal/hilar adenopathy  ED started patient on heparin drip  Patient has no history of DVT or PE, family history of clotting disorders, patient notes that she is up-to-date with all screenings  Current tobacco user 1 pack/day    Plan-  D/c heparin gtt due to negative LE doppler  Appreciate pulmonology recommendations  Will eventually  require a malignancy work-up        (HFpEF) heart failure with preserved ejection fraction ([address])  Assessment & Plan  Wt Readings from Last 3 Encounters:   08/30/23 73.3 kg (161 lb 9.6 oz)   08/11/23 74.8 kg (165 lb)   07/11/23 71.7 kg (158 lb)   Patient actually complains of recent significant weight loss  Euvolemic on exam  Lung findings on imaging leans more towards a clinical picture of pneumonia rather than CHF exacerbation  Last echo was in 2021 EF 34%, grade 1 diastolic dysfunction    Plan-  Off IVF  Continue to monitor volume status  Repeat echo 8/28/23:  left ventricular ejection fraction is 60%.  Systolic function is normal. Wall motion is normal. Diastolic function is mildly abnormal, consistent with grade I (abnormal) relaxation. There is a trivial pericardial effusion along the right atrial free wall. Stage 3a chronic kidney disease [facility])  Assessment & Plan  Lab Results   Component Value Date    EGFR 48 08/30/2023    EGFR 45 08/29/2023    EGFR 47 08/28/2023    CREATININE 1.15 08/30/2023    CREATININE 1.22 08/29/2023    CREATININE 1.17 08/28/2023   Creatinine baseline appears to be around 1.5  Does not appear to be in acute kidney injury at this time    Plan-  Monitor I's/O  Monitor BMP  Avoid nephrotoxins  Avoid hypotension    Elevated troponin  Assessment & Plan  Likely non-MI troponin elevation secondary to pulmonary embolism versus sepsis   Troponin 73, 60, 76  EKG sinus tachycardia 106 no ST-T wave changes  Denies chest pain, palpitations    Plan-  Continue to monitor  telemetry showed no significant events    Benign essential hypertension  Assessment & Plan  BP on admission 149/87  Home regimen includes amlodipine 10, Coreg 25 BID, lisinopril 10qd    Plan-  Continue home regimen      Medical Problems     Resolved Problems  Date Reviewed: 8/27/2023   None       Discharging Resident: Unruly Myrick MD  Discharging Attending: Aníbal Taylor MD  PCP: Roselynn Heimlich, MD  Admission Date:   Admission Orders (From admission, onward)     Ordered        08/25/23 1754  INPATIENT ADMISSION  Once                      Discharge Date: 08/30/23    Consultations During Hospital Stay:  · Pulmonology    Procedures Performed:   · None    Significant Findings / Test Results:   VAS lower limb venous duplex study, complete bilateral   Final Result by Pepe Khan MD (08/27 2012)      XR chest portable ICU   Final Result by Mindy Aguilera MD (08/28 4965)      Stable volume loss on the left secondary to left lower lobe atelectasis. Workstation performed: [id number]         CTA ED chest PE Study   Final Result by Khadijah Hackett MD (08/25 1703)      Limited study.       There is equivocal embolus in the posterior basal segment left lower lobe. Other smaller segments are difficult to assess due to motion and vascular crowding. There is mucous plugging in the left mainstem bronchus with volume loss in portions of the left lower lobe and left upper lobe. Aspiration pneumonia is not excluded. There is dilatation of the main pulmonary artery and proximal pulmonary segment suggesting pulmonary hypertension this is more likely chronic than acute given the degree of distention. Emphysema is present. There is significant mediastinal and hilar adenopathy suggesting underlying neoplasm more likely than simple reactive nodes. Enlarging lesion at T12 is again demonstrated of uncertain etiology. This has been present since 2013. Perhaps a plasmacytoma is a consideration. Neoplastic work-up is advised. Pulmonology consultation is also advised to assess endobronchial obstruction on the left. This was discussed with resident physician Dr. Guillermina Gregg at 4:58 p.m. Follow-up was marked in the epic system      Workstation performed: [id number]         XR chest 1 view portable   Final Result by Jasen Castro MD (08/25 1222)   Increased cardiomegaly. Development of CHF. Probable left basal infiltrate         Workstation performed: [id number]           ·     Incidental Findings:   · See above  · I reviewed the above mentioned incidental findings with the patient and/or family and they expressed understanding. Test Results Pending at Discharge (will require follow up):   · None     Outpatient Tests Requested:  Patient requires repeat CT chest for Pulmonology in 6-8 weeks    Complications: None    Reason for Admission: Shortness of breath    Hospital Course:   Trina Cantu is a 69 y.o. female patient with a past medical history of current tobacco abuse, HFpEF, CKD stage III, HTN, HLD who originally presented to the hospital on 8/25/2023 due to progressively worsening shortness of breath. Upon arrival patient was tachycardic in the 120s and in acute respiratory failure requiring a nonrebreather. Initial labs demonstrated a leukocytosis of 17.9, D-dimer 0.99, slightly elevated troponins, , CTA chest PE demonstrated an equivocal embolus in the posterior basal left segment lower lobe and mucous plugging in the left mainstem bronchus with volume loss in the left lower lung and left upper lung as well as some emphysema, pulmonary hypertension and mediastinal/hilar adenopathy concerning for neoplasm. Patient was started on a heparin drip. Throughout hospitalization pulmonology was consulted who recommended broadening antibiotics patient was treated with ceftriaxone, azithromycin, vancomycin. Repeat chest x-ray demonstrated Stable volume loss on the left secondary to left lower lobe atelectasis. VAS US did not reveal any evidence of acute or chronic DVT or superficial thrombophlebitis, and pulmonology recommended discontinuing the heparin drip. The patient's blood cultures were negative, MRSA negative, and sputum culture positive for gram - rods, no polys. The patient's Vancomycin and azithromycin were then discontinued, and she was continued on ceftriaxone for a projected course of 7 days. She required 1 dose of IV lasix 40 mg for fluid overload. Pulmonology prescribed hypertonic nebs, atrovent/xenepex, & high frequency chest wall oscillation to aid in mucus expectoration. O2 was weaned throughout her stay, and she was able to remain on RA at the time of discharge. She was discharged with instruction to complete her course of antibiotics using Cefdinir BID x1 day. She was instructed by pulmonology to follow up outpatient and complete a CT chest in 6-8 weeks. The patient was agreeable to this plan. Please see above list of diagnoses and related plan for additional information.      Condition at Discharge: fair    Discharge Day Visit / Exam:   Subjective:  Ms. Cassie Rodriguez doyle feels congested today, however, is continuing to expectorate mucus successfully. She has a new complaint of vulvar itching that began yesterday. She denies burning with urination & frequency. She is currently breathing comfortably on RA. Vitals: Blood Pressure: 150/95 (08/30/23 0744)  Pulse: 97 (08/29/23 1648)  Temperature: 98.1 °F (36.7 °C) (08/30/23 0744)  Temp Source: Oral (08/28/23 0500)  Respirations: 18 (08/30/23 0744)  Height: 5' 1" (154.9 cm) (08/28/23 0900)  Weight - Scale: 73.3 kg (161 lb 9.6 oz) (08/30/23 0600)  SpO2: 93 % (08/30/23 0957)  Exam:   Physical Exam  Vitals and nursing note reviewed. Constitutional:       General: She is not in acute distress. Appearance: She is well-developed. HENT:      Head: Normocephalic and atraumatic. Eyes:      Extraocular Movements: Extraocular movements intact. Conjunctiva/sclera: Conjunctivae normal.   Cardiovascular:      Rate and Rhythm: Normal rate and regular rhythm. Heart sounds: No murmur heard. Pulmonary:      Effort: Pulmonary effort is normal. No respiratory distress. Breath sounds: Normal breath sounds. Abdominal:      General: Abdomen is flat. Palpations: Abdomen is soft. Tenderness: There is no abdominal tenderness. Musculoskeletal:         General: No swelling. Cervical back: Neck supple. Skin:     General: Skin is warm and dry. Neurological:      Mental Status: She is alert. Psychiatric:         Mood and Affect: Mood normal.          Discussion with Family: Updated  (daughter) via phone. Discharge instructions/Information to patient and family:   See after visit summary for information provided to patient and family. Provisions for Follow-Up Care:  See after visit summary for information related to follow-up care and any pertinent home health orders.        Disposition:   Other: Home with home rehab    Planned Readmission: None    Discharge Medications:  See after visit summary for reconciled discharge medications provided to patient and/or family.       **Please Note: This note may have been constructed using a voice recognition system**

## 2023-08-27 NOTE — ASSESSMENT & PLAN NOTE
CTA chest PE- equivocal embolus in the posterior basal segment of the left lower lung, mucous plugging in the left mainstem bronchus with volume loss in the left lower lobe and left upper lobe, aspiration pneumonia not excluded, Dilatation of pulmonary artery which is indicative of chronic pulmonary hypertension, emphysema, mediastinal hilar adenopathy concerning for neoplasm, Enlarging T12 lesion    Plan-  Appreciate Pulmonology recommendations-recommending cefepime, azithromycin, vancomycin, follow-up on MRSA culture, pulmonary hygiene, flutter valve, repeat chest x-ray if worsening may need bronchoscopy we will hold off as of yesterday  Recommend discontinuing heparin drip unless Doppler is positive  Hypertonic nebulizers, Atrovent/Xopenex, hold off on ICS  Xopenex, Atrovent, DuoNebs  Airway clearance protocol  Flutter valve

## 2023-08-27 NOTE — ASSESSMENT & PLAN NOTE
BP on admission 149/87  Home regimen includes amlodipine 10, Coreg 25 BID, lisinopril 10qd    Plan-  Continue home regimen

## 2023-08-27 NOTE — UTILIZATION REVIEW
Initial Clinical Review    Admission: Date/Time/Statement:   Admission Orders (From admission, onward)     Ordered        08/25/23 1754  INPATIENT ADMISSION  Once                      Orders Placed This Encounter   Procedures   • INPATIENT ADMISSION     Standing Status:   Standing     Number of Occurrences:   1     Order Specific Question:   Level of Care     Answer:   Level 2 Stepdown / HOT [14]     Order Specific Question:   Estimated length of stay     Answer:   More than 2 Midnights     Order Specific Question:   Certification     Answer:   I certify that inpatient services are medically necessary for this patient for a duration of greater than two midnights. See H&P and MD Progress Notes for additional information about the patient's course of treatment. ED Arrival Information     Expected   -    Arrival   8/25/2023 11:24    Acuity   Emergent            Means of arrival   Walk-In    Escorted by   Family Member    Service   Hospitalist    Admission type   Emergency            Arrival complaint   CONGESTION/NAUSEA           Chief Complaint   Patient presents with   • Shortness of Breath     Ongoing for the last few months, has been having shortness of breath. States she unable to breathe out of nose. Denies chest pain. Started today with nausea/vomiting. Had Covid in July and these symptoms started after then. Diaphoretic in triage with low SPO2. Initial Presentation: 69 y.o. female to ED w family member presents w progressively worsening shortness of breath with productive cough as well as congestion. Reports 1 month ongoing symptoms progressively worsened. Follow w PCP who attributes symptoms to COVID infecton in July, DXd w long COVID. Reports significant WT loss since 4/2023 w night sweats. PMH  COVID infection, tobacco abuse, HFpEF, CKD3, HTN, HLD. EXAM  Afebrile, tachycardia, hypoxia to 84% on room air escalating requiring non re breather.  Labs soft trop elevation/ elevated BNP, CXR increased cardiomegaly, L ibasilar infiltrate; CT chest equivocal embolus posterior basal segment left lower lobe, mucous plugging in the left mainstem bronchus with volume loss in the left lower lung and left upper lung, aspiration pneumonia is not excluded, dilatation of the pulmonary artery & adenopathy concerning for neoplasm    Inpatient admission SD2 ICU  due to Sepsis 2ndary to PNA, PE, acute hypoxic resp failure  IV Heparin GTT, dual IV antibx, follow pro rene, BCX, Strep PNA, legionella. Gentle IVF, 15L  NC, consult Pulmonology. NEBs. Tele & monitor volume status; I/O, BMP, avoid nephrotoxins/hypotension. Cont home BP meds  Date: 8/26   Day 2:  Attending MD  Mild tachycardia, 6 L NC, IV Heparin GTT & follow LE venous duplex; eventual BX of Hilar adenopathy. DC IVF,  cont IV antibx & follow CXs. Give x1 dose IV Lasix   Pulmonology  Acute Hypoxic resp failure w multifocal PNA, paraseptal & centrilobular emphysema, L lung dense consolidation, mediastinal lymphadenopathy particulaar R Hilum  Follow sputum CX, cont IV Cefepime & azithro recommend add IV Vanco. Obtain MRSA nares. Pulmonary hygiene w flutter valve. Start hypertonic NEBs, cont Atrovent/xopenex hold off on ICS. Supplemental O2 & AM CXR; if mucous plugging worsens may req Bronchoscopy; LE venous doppler, DC Heparin if small subsequental PE unless LE doppler + DVT  Date: 8/27/2023    Day 3: Has surpassed a 2nd midnight with active treatments and services, which include ongoing assessments, vitals, IV medications, supplemental O2, cont consults.     ED Triage Vitals   Temperature Pulse Respirations Blood Pressure SpO2   08/25/23 1133 08/25/23 1133 08/25/23 1133 08/25/23 1140 08/25/23 1138   98.4 °F (36.9 °C) (!) 124 22 (!) 178/92 (!) 84 %      Temp Source Heart Rate Source Patient Position - Orthostatic VS BP Location FiO2 (%)   08/25/23 1133 08/25/23 1133 08/25/23 1133 08/25/23 1133 --   Oral Monitor Sitting Left arm       Pain Score       08/25/23 1200       No Pain          Wt Readings from Last 1 Encounters:   08/27/23 76.1 kg (167 lb 12.3 oz)     Additional Vital Signs:   Date/Time Temp Pulse Resp BP MAP (mmHg) SpO2 Calculated FIO2 (%) - Nasal Cannula O2 Flow Rate (L/min) Nasal Cannula O2 Flow Rate (L/min) O2 Device Patient Position - Orthostatic VS   08/27/23 0754 98.2 °F (36.8 °C) 89 19 162/92 118 93 % -- -- -- Nasal cannula Lying   08/27/23 0713 -- -- -- -- -- 95 % -- -- -- -- --   08/26/23 2246 98.3 °F (36.8 °C) 92 25 Abnormal  149/87 113 93 % --         Date/Time Temp Pulse Resp BP MAP (mmHg) SpO2 Calculated FIO2 (%) - Nasal Cannula O2 Flow Rate (L/min) Nasal Cannula O2 Flow Rate (L/min) O2 Device Patient Position - Orthostatic VS   08/26/23 1920 -- -- -- -- -- 96 % 28 -- 2 L/min Nasal cannula --   08/26/23 1855 -- -- -- -- -- -- 28 -- 2 L/min -- --   08/26/23 1530 98.6 °F (37 °C) 97 31 Abnormal  139/101 Abnormal  116 90 % -- -- -- Nasal cannula Lying   08/26/23 1317 -- -- -- -- -- 96 % -- -- -- -- --   08/26/23 1157 98.6 °F (37 °C) 84 22 139/101 Abnormal  116 93 % 44 -- 6 L/min -- --   08/26/23 0751 98.8 °F (37.1 °C) 109 Abnormal  25 Abnormal  172/99 Abnormal  127 93 % -- -- -- Nasal cannula Lying   08/26/23 0600 -- 105 28 Abnormal  -- -- 94 % -- -- -- -- --   08/26/23 0500 -- 101 36 Abnormal  -- -- 87 % Abnormal  -- -- -- -- --   08/26/23 0400 -- 105 31 Abnormal  -- -- 90 % -- -- -- -- --   08/26/23 0300 -- 107 Abnormal  35 Abnormal  -- -- 94 % -- -- -- -- --   08/26/23 0243 98.3 °F (36.8 °C) 108 Abnormal  27 Abnormal  157/94 118 94 % 44 -- 6 L/min Nasal cannula Lying   08/26/23 0200 -- 107 Abnormal  28 Abnormal  -- -- 89 % Abnormal  -- -- -- -- --   08/26/23 0100 -- 103 -- -- -- 91 % -- -- -- -- --   08/26/23 0000 -- 102 42 Abnormal  -- -- 93 % -- -- -- -- --   08/25/23 2300 -- 107 Abnormal  37 Abnormal  -- -- 89 % Abnormal  -- -- -- -- --   08/25/23 2228 98.4 °F (36.9 °C) 107 Abnormal  14 170/86 117 90 % -- -- -- Nasal cannula Lying   08/25/23 2200 -- 104 18 -- -- 97 % 44 -- 6 L/min Nasal cannula --   08/25/23 2101 -- -- -- -- -- 95 % -- -- -- -- --   08/25/23 2100 -- 105 22 -- -- 97 % -- -- -- -- --   08/25/23 2003 -- -- -- -- -- -- -- 15 L/min -- Non-rebreather mask --   08/25/23 2000 -- 107 Abnormal  -- -- -- 97 % 44 -- 6 L/min Nasal cannula --   08/25/23 1800 -- 104 22 147/69 99 93 % 44 -- 6 L/min Nasal cannula Sitting   08/25/23 1730 -- 105 22 158/74 107 97 % -- -- -- Non-rebreather mask Sitting   08/25/23 1700 -- 110 Abnormal  22 177/89 Abnormal  125 99 % -- -- -- Non-rebreather mask Sitting   08/25/23 1645 -- 114 Abnormal  22 173/90 Abnormal  121 96 % -- -- -- Non-rebreather mask Sitting   08/25/23 1536 -- 102 22 158/86 -- 94 % -- -- -- Non-rebreather mask Sitting   08/25/23 1400 -- 106 Abnormal  -- 157/120 Abnormal  132 96 % -- -- -- -- --   08/25/23 1330 -- 102 -- 160/95 122 92 % -- -- -- -- --   08/25/23 1315 -- 102 -- 149/95 118 94 % -- -- -- -- --   08/25/23 1245 -- 99 -- 153/76 109 96 % -- -- -- -- --   08/25/23 1216 -- -- -- -- -- 97 % -- 15 L/min -- Non-rebreather mask --   08/25/23 1215 -- 102 -- 154/86 114 82 % Abnormal   -- -- -- -- --   SpO2: provider notified and pt placed on non rebreather at 08/25/23 1215   08/25/23 1200 -- 103 -- 159/82 114 92 % -- -- -- -- --   08/25/23 1140 -- -- -- 178/92 Abnormal  -- -- -- -- -- -- --   08/25/23 1139 -- -- -- -- -- 96 % -- 15 L/min -- Non-rebreather mask --   08/25/23 1138 -- -- -- -- -- 84 % Abnormal  -- -- -- None (Room air) --   08/25/23 1133 98.4 °F (36.9 °C) 124 Abnormal  22 -- -- -- -- -- -- -- Sitting     Weights (last 14 days)    Date/Time Weight Weight Method Height   08/25/23 1937 79.3 kg (174 lb 13.2 oz) Bed scale --   08/25/23 1330 78.7 kg (173 lb 8 oz) Stretcher scale 5' 1" (1.549 m)       Pertinent Labs/Diagnostic Test Results:   8/25 ecg=  ECG rate assessment: tachycardic     Rhythm:     Rhythm: sinus rhythm     Ectopy:     Ectopy: none     QRS:     QRS axis:  Right     QRS intervals:  Normal Conduction:     Conduction: normal     ST segments:     ST segments:  Normal   T waves:     T waves: normal     Other findings:     Other findings: JANE   CTA ED chest PE Study   Final Result by Eric Carranza MD (08/25 1703)      Limited study. There is equivocal embolus in the posterior basal segment left lower lobe. Other smaller segments are difficult to assess due to motion and vascular crowding. There is mucous plugging in the left mainstem bronchus with volume loss in portions of the left lower lobe and left upper lobe. Aspiration pneumonia is not excluded. There is dilatation of the main pulmonary artery and proximal pulmonary segment suggesting pulmonary hypertension this is more likely chronic than acute given the degree of distention. Emphysema is present. There is significant mediastinal and hilar adenopathy suggesting underlying neoplasm more likely than simple reactive nodes. Enlarging lesion at T12 is again demonstrated of uncertain etiology. This has been present since 2013. Perhaps a plasmacytoma is a consideration. Neoplastic work-up is advised. Pulmonology consultation is also advised to assess endobronchial obstruction on the left. This was discussed with resident physician Dr. Keila Cherry at 4:58 p.m. Follow-up was marked in the epic system      Workstation performed: [id number]         XR chest 1 view portable   Final Result by Shavon Rasheed MD (08/25 1222)   Increased cardiomegaly. Development of CHF.       Probable left basal infiltrate         Workstation performed: [id number]         VAS lower limb venous duplex study, complete bilateral    (Results Pending)   XR chest portable ICU    (Results Pending)     Results from last 7 days   Lab Units 08/25/23  1157   SARS-COV-2  Negative     Results from last 7 days   Lab Units 08/27/23  0442 08/26/23  0511 08/25/23  1157   WBC Thousand/uL 10.73* 14.17* 17.92*   HEMOGLOBIN g/dL 14.5 14.0 15. 2   HEMATOCRIT % 47.2* 44.1 47.1*   PLATELETS Thousands/uL 256 276 291   NEUTROS ABS Thousands/µL  --  11.41* 15.53*         Results from last 7 days   Lab Units 08/27/23  0442 08/26/23  0511 08/25/23  1157   SODIUM mmol/L 139 139 139   POTASSIUM mmol/L 3.6 5.0 4.0   CHLORIDE mmol/L 104 108 107   CO2 mmol/L 27 27 24   ANION GAP mmol/L 8 4 8   BUN mg/dL 10 11 15   CREATININE mg/dL 0.77 0.83 1.01   EGFR ml/min/1.73sq m 79 72 56   CALCIUM mg/dL 9.3 9.1 9.9   MAGNESIUM mg/dL  --  1.9  --      Results from last 7 days   Lab Units 08/25/23  1157   AST U/L 14   ALT U/L 6*   ALK PHOS U/L 78   TOTAL PROTEIN g/dL 8.5*   ALBUMIN g/dL 3.6   TOTAL BILIRUBIN mg/dL 0.31         Results from last 7 days   Lab Units 08/27/23 0442 08/26/23  0511 08/25/23  1157   GLUCOSE RANDOM mg/dL 95 96 131             No results found for: "BETA-HYDROXYBUTYRATE"                   Results from last 7 days   Lab Units 08/25/23  1731 08/25/23  1339 08/25/23  1157   HS TNI 0HR ng/L  --   --  73*   HS TNI 2HR ng/L  --  60*  --    HSTNI D2 ng/L  --  -13  --    HS TNI 4HR ng/L 76*  --   --    HSTNI D4 ng/L 3  --   --      Results from last 7 days   Lab Units 08/25/23  1157   D-DIMER QUANTITATIVE ug/ml FEU 0.99*     Results from last 7 days   Lab Units 08/27/23  0442 08/26/23  0751 08/26/23  0107 08/25/23  1339   PROTIME seconds  --   --   --  13.5   INR   --   --   --  0.97   PTT seconds 67* 64* 89* 28         Results from last 7 days   Lab Units 08/26/23  0511 08/25/23  2109   PROCALCITONIN ng/ml 0.15 0.17     Results from last 7 days   Lab Units 08/25/23  2108   LACTIC ACID mmol/L 0.6             Results from last 7 days   Lab Units 08/25/23  1308   BNP pg/mL 272*           Results from last 7 days   Lab Units 08/25/23 2108 08/25/23  1157   STREP PNEUMONIAE ANTIGEN, URINE  Negative  --    LEGIONELLA URINARY ANTIGEN  Negative  --    INFLUENZA A PCR   --  Negative   INFLUENZA B PCR   --  Negative   RSV PCR   --  Negative     Results from last 7 days Lab Units 08/25/23  2222 08/25/23  2107   BLOOD CULTURE   --  No Growth at 24 hrs. No Growth at 24 hrs.    GRAM STAIN RESULT  No Epithelial cells seen*  No polys seen*  1+ Gram negative rods*  --      ED Treatment:   Medication Administration from 08/25/2023 1124 to 08/25/2023 1912       Date/Time Dose Route Action     08/25/2023 1210 EDT 4 mg Intravenous Given     08/25/2023 1341 EDT 1,000 mg Intravenous New Bag     08/25/2023 1603 EDT 80 mL Intravenous Given     08/25/2023 1841 EDT 6,000 Units Intravenous Given     08/25/2023 1840 EDT 18 Units/kg/hr Intravenous New Bag        Past Medical History:   Diagnosis Date   • Anxiety    • Depression    • Fatty liver    • Hyperlipidemia    • Hypertension    • Psychiatric disorder    • Varicella      Present on Admission:  • Stage 3a chronic kidney disease ([address])  • Elevated troponin  • Benign essential hypertension      Admitting Diagnosis: Shortness of breath [R06.02]  Emphysema of lung (HCC) [J43.9]  CHF (congestive heart failure) (HCC) [I50.9]  Pulmonary embolism (HCC) [I26.99]  Pulmonary hypertension (HCC) [I27.20]  Acute respiratory failure with hypoxia (HCC) [J96.01]  Lymphadenopathy, hilar [R59.0]  Mucus plugging of bronchi [T17.500A]  Age/Sex: 69 y.o. female  Admission Orders:   [id number] [facility] [address]  Tele  ECHO  AM CXR  Bilateral VAS venous   Cardiac diet 2GM NA & fluid restrict 1800 ML  I/O  Daily wt  OOB    Scheduled Medications:  amLODIPine, 10 mg, Oral, Daily  azithromycin, 500 mg, Intravenous, Q24H  busPIRone, 30 mg, Oral, TID  carvedilol, 25 mg, Oral, BID With Meals  cefTRIAXone, 1,000 mg, Intravenous, Q24H  famotidine, 20 mg, Oral, Daily  fluticasone, 2 spray, Each Nare, Daily  gabapentin, 600 mg, Oral, TID  ipratropium, 0.5 mg, Nebulization, TID  levalbuterol, 1.25 mg, Nebulization, TID  lisinopril, 10 mg, Oral, Daily  loratadine, 10 mg, Oral, Daily  nicotine, 1 patch, Transdermal, Daily  sertraline, 50 mg, Oral, Daily  sodium chloride, 4 mL, Nebulization, TID  vancomycin, 1,500 mg, Intravenous, Q24H    Continuous IV Infusions:  heparin (porcine), 3-30 Units/kg/hr (Order-Specific), Intravenous, Titrated    PRN Meds:  acetaminophen, 650 mg, Oral, Q6H PRN  heparin (porcine), 3,000 Units, Intravenous, Q6H PRN  heparin (porcine), 6,000 Units, Intravenous, Q6H PRN  ipratropium-albuterol, 3 mL, Nebulization, Q6H PRN  ondansetron, 4 mg, Intravenous, Q6H PRN  oxyCODONE, 5 mg, Oral, BID PRN  polyethylene glycol, 17 g, Oral, Daily PRN        IP CONSULT TO PULMONOLOGY  IP CONSULT TO PHARMACY    Network Utilization Review Department  ATTENTION: Please call with any questions or concerns to [phone] and carefully listen to the prompts so that you are directed to the right person. All voicemails are confidential.  Penelope Menezes all requests for admission clinical reviews, approved or denied determinations and any other requests to dedicated fax number below belonging to the campus where the patient is receiving treatment.  List of dedicated fax numbers for the Facilities:  [place] DENIALS (Administrative/Medical Necessity) [phone]   [facility] [phone]   [address] [place] [address] [phone]   [facility] [address] [facility] [phone] [address] [place] [address] [facility] [phone]   [address] [facility] [address] [facility] [place] [address] [phone]

## 2023-08-27 NOTE — ASSESSMENT & PLAN NOTE
Initially presenting due to worsening shortness of breath and productive cough  D-dimer 0.99    CTA chest PE study-equivocal embolus posterior basal segment of the left lower lung  CT findings also suspicious for possible neoplasm in the setting of mediastinal/hilar adenopathy  ED started patient on heparin drip  Patient has no history of DVT or PE, family history of clotting disorders, patient notes that she is up-to-date with all screenings  Current tobacco user 1 pack/day    Plan-  Continue heparin drip  Appreciate pulmonology recommendations-May discontinue heparin drip if VAS US is negative  Will eventually  require a malignancy work-up  VAS US-pending

## 2023-08-27 NOTE — ASSESSMENT & PLAN NOTE
Complaining of shortness of breath and productive cough that has been progressively worsening over the past month  Prior to arrival she was severely short of breath at rest which prompted her to come into the ED  On arrival patient was saturating 84% on room air then escalated to 15 L and ultimately a nonrebreather      Plan-  See sepsis secondary to pneumonia  Continue to wean as appropriate

## 2023-08-27 NOTE — PLAN OF CARE
Problem: Potential for Falls  Goal: Patient will remain free of falls  Description: INTERVENTIONS:  - Educate patient/family on patient safety including physical limitations  - Instruct patient to call for assistance with activity   - Consult OT/PT to assist with strengthening/mobility   - Keep Call bell within reach  - Keep bed low and locked with side rails adjusted as appropriate  - Keep care items and personal belongings within reach  - Initiate and maintain comfort rounds  - Make Fall Risk Sign visible to staff  - Offer Toileting every 2 Hours, in advance of need  - Initiate/Maintain bed alarm  - Obtain necessary fall risk management equipment: bed alarm  - Apply yellow socks and bracelet for high fall risk patients  - Consider moving patient to room near nurses station  Outcome: Progressing     Problem: MOBILITY - ADULT  Goal: Maintain or return to baseline ADL function  Description: INTERVENTIONS:  -  Assess patient's ability to carry out ADLs; assess patient's baseline for ADL function and identify physical deficits which impact ability to perform ADLs (bathing, care of mouth/teeth, toileting, grooming, dressing, etc.)  - Assess/evaluate cause of self-care deficits   - Assess range of motion  - Assess patient's mobility; develop plan if impaired  - Assess patient's need for assistive devices and provide as appropriate  - Encourage maximum independence but intervene and supervise when necessary  - Involve family in performance of ADLs  - Assess for home care needs following discharge   - Consider OT consult to assist with ADL evaluation and planning for discharge  - Provide patient education as appropriate  Outcome: Progressing  Goal: Maintains/Returns to pre admission functional level  Description: INTERVENTIONS:  - Perform BMAT or MOVE assessment daily.   - Set and communicate daily mobility goal to care team and patient/family/caregiver.    - Collaborate with rehabilitation services on mobility goals if consulted  Outcome: Progressing

## 2023-08-27 NOTE — PROGRESS NOTES
Progress Note - Pulmonary   Urbano Samples 69 y.o. female MRN: [id number]  Unit/Bed#: ICU 16 Encounter: [id number]    Assessment:    1. Acute hypoxic respiratory failure- secondary to presumed pneumonia  2. Multifocal pneumonia  3. Paraseptal and centrilobular emphysema- predominate upper lobe  4. Left lung dense consolidation- with left distal mainstem mucus plugging  5. Mediastinal lymphadenopathy- in particular the right hilum  6. RLL consolidation  7. Questionable pulmonary embolism- reported in the posterior basal segment left lower lobe  8. Tobacco abuse- 1ppd for 30 years     Plan:    · Continue ceftriaxone, azithro and vanc  · If MRSA negative, can d/c vanc  · F/up sputum culture  · Continue pulmonary hygiene  · Continue to wean supp o2 now down to 4L NC (from 10L with NRB)  · Continue atrovent/xopenex and continue to hold ICS  · F/up dopplers of LE, if no DVT would d/c heparin  · Smoking cessation  · Eventual repeat CT chest to determine if biopsy/EBUS needed for mediastinal lymphadenopathy    Chief Complaint:   "I feel much better"    Subjective:   Patient seen and examined bedside. Reports feeling much better today, coughing up sputum and feeling relieved    Objective:     Vitals: Blood pressure 162/92, pulse 89, temperature 98.2 °F (36.8 °C), temperature source Oral, resp. rate 19, height 5' 1" (1.549 m), weight 76.1 kg (167 lb 12.3 oz), SpO2 93 %. ,Body mass index is 31.7 kg/m².       Intake/Output Summary (Last 24 hours) at 8/27/2023 1105  Last data filed at 8/27/2023 0601  Gross per 24 hour   Intake 724.01 ml   Output 1900 ml   Net -1175.99 ml       Invasive Devices     Peripheral Intravenous Line  Duration           Peripheral IV 08/25/23 Distal;Left;Ventral (anterior) Forearm 1 day    Peripheral IV 08/25/23 Right Antecubital 1 day                Physical Exam:  General: awake, alert  Pulm: mild rhonchi on the left and slightly diminished, no wheezing  GI: abd soft, nontender  Cardiac: RRR, no murmurs  Skin: no rashes, no jaundice  Neuro: CN grossly intact, no focal deficits  Psych: appropriate mood, normal affect    Labs: I have personally reviewed pertinent lab results. Imaging and other studies: I have personally reviewed pertinent reports.    and I have personally reviewed pertinent films in PACS

## 2023-08-28 ENCOUNTER — APPOINTMENT (INPATIENT)
Dept: NON INVASIVE DIAGNOSTICS | Facility: HOSPITAL | Age: 68
DRG: 871 | End: 2023-08-28
Payer: COMMERCIAL

## 2023-08-28 LAB
ANION GAP SERPL CALCULATED.3IONS-SCNC: 5 MMOL/L
AORTIC ROOT: 3.4 CM
APICAL FOUR CHAMBER EJECTION FRACTION: 52 %
APTT PPP: 80 SECONDS (ref 23–37)
ASCENDING AORTA: 3.5 CM
AV PEAK GRADIENT: 13 MMHG
BACTERIA SPT RESP CULT: ABNORMAL
BACTERIA SPT RESP CULT: ABNORMAL
BUN SERPL-MCNC: 15 MG/DL (ref 5–25)
CALCIUM SERPL-MCNC: 9.2 MG/DL (ref 8.4–10.2)
CHLORIDE SERPL-SCNC: 108 MMOL/L (ref 96–108)
CO2 SERPL-SCNC: 27 MMOL/L (ref 21–32)
CREAT SERPL-MCNC: 1.17 MG/DL (ref 0.6–1.3)
E WAVE DECELERATION TIME: 258 MS
ERYTHROCYTE [DISTWIDTH] IN BLOOD BY AUTOMATED COUNT: 15.1 % (ref 11.6–15.1)
FRACTIONAL SHORTENING: 37 % (ref 28–44)
GFR SERPL CREATININE-BSD FRML MDRD: 47 ML/MIN/1.73SQ M
GLUCOSE SERPL-MCNC: 92 MG/DL (ref 65–140)
GRAM STN SPEC: ABNORMAL
HCT VFR BLD AUTO: 39.7 % (ref 34.8–46.1)
HGB BLD-MCNC: 12.7 G/DL (ref 11.5–15.4)
INTERVENTRICULAR SEPTUM IN DIASTOLE (PARASTERNAL SHORT AXIS VIEW): 2 CM
INTERVENTRICULAR SEPTUM: 2 CM (ref 0.6–1.1)
LAAS-AP2: 22.2 CM2
LAAS-AP4: 22.6 CM2
LEFT ATRIUM SIZE: 4.9 CM
LEFT ATRIUM VOLUME (MOD BIPLANE): 69 ML
LEFT INTERNAL DIMENSION IN SYSTOLE: 2.2 CM (ref 2.1–4)
LEFT VENTRICULAR INTERNAL DIMENSION IN DIASTOLE: 3.5 CM (ref 3.5–6)
LEFT VENTRICULAR POSTERIOR WALL IN END DIASTOLE: 1.5 CM
LEFT VENTRICULAR STROKE VOLUME: 33 ML
LVSV (TEICH): 33 ML
MCH RBC QN AUTO: 31.3 PG (ref 26.8–34.3)
MCHC RBC AUTO-ENTMCNC: 32 G/DL (ref 31.4–37.4)
MCV RBC AUTO: 98 FL (ref 82–98)
MRSA NOSE QL CULT: NORMAL
MV E'TISSUE VEL-SEP: 4 CM/S
MV PEAK A VEL: 0.99 M/S
MV PEAK E VEL: 70 CM/S
MV STENOSIS PRESSURE HALF TIME: 75 MS
MV VALVE AREA P 1/2 METHOD: 2.93 CM2
PLATELET # BLD AUTO: 249 THOUSANDS/UL (ref 149–390)
PMV BLD AUTO: 9.2 FL (ref 8.9–12.7)
POTASSIUM SERPL-SCNC: 3.7 MMOL/L (ref 3.5–5.3)
RA PRESSURE ESTIMATED: 3 MMHG
RBC # BLD AUTO: 4.06 MILLION/UL (ref 3.81–5.12)
RIGHT ATRIAL 2D VOLUME: 42 ML
RIGHT ATRIUM AREA SYSTOLE A4C: 16.9 CM2
RIGHT VENTRICLE ID DIMENSION: 3.7 CM
RV PSP: 26 MMHG
SL CV LEFT ATRIUM LENGTH A2C: 5.8 CM
SL CV LV EF: 60
SL CV PED ECHO LEFT VENTRICLE DIASTOLIC VOLUME (MOD BIPLANE) 2D: 50 ML
SL CV PED ECHO LEFT VENTRICLE SYSTOLIC VOLUME (MOD BIPLANE) 2D: 16 ML
SODIUM SERPL-SCNC: 140 MMOL/L (ref 135–147)
TR MAX PG: 23 MMHG
TR PEAK VELOCITY: 2.4 M/S
TRICUSPID ANNULAR PLANE SYSTOLIC EXCURSION: 1.8 CM
TRICUSPID VALVE PEAK REGURGITATION VELOCITY: 2.37 M/S
VANCOMYCIN SERPL-MCNC: 18.4 UG/ML (ref 10–20)
WBC # BLD AUTO: 8.4 THOUSAND/UL (ref 4.31–10.16)

## 2023-08-28 PROCEDURE — 99232 SBSQ HOSP IP/OBS MODERATE 35: CPT | Performed by: INTERNAL MEDICINE

## 2023-08-28 PROCEDURE — 94760 N-INVAS EAR/PLS OXIMETRY 1: CPT

## 2023-08-28 PROCEDURE — 94640 AIRWAY INHALATION TREATMENT: CPT

## 2023-08-28 PROCEDURE — 80048 BASIC METABOLIC PNL TOTAL CA: CPT

## 2023-08-28 PROCEDURE — 93306 TTE W/DOPPLER COMPLETE: CPT

## 2023-08-28 PROCEDURE — 85027 COMPLETE CBC AUTOMATED: CPT

## 2023-08-28 PROCEDURE — 94664 DEMO&/EVAL PT USE INHALER: CPT

## 2023-08-28 PROCEDURE — 94668 MNPJ CHEST WALL SBSQ: CPT

## 2023-08-28 PROCEDURE — 80202 ASSAY OF VANCOMYCIN: CPT

## 2023-08-28 PROCEDURE — 93306 TTE W/DOPPLER COMPLETE: CPT | Performed by: INTERNAL MEDICINE

## 2023-08-28 PROCEDURE — 94669 MECHANICAL CHEST WALL OSCILL: CPT

## 2023-08-28 PROCEDURE — 85730 THROMBOPLASTIN TIME PARTIAL: CPT | Performed by: INTERNAL MEDICINE

## 2023-08-28 RX ORDER — METHYLPREDNISOLONE SODIUM SUCCINATE 40 MG/ML
40 INJECTION, POWDER, LYOPHILIZED, FOR SOLUTION INTRAMUSCULAR; INTRAVENOUS DAILY
Status: DISCONTINUED | OUTPATIENT
Start: 2023-08-28 | End: 2023-08-29

## 2023-08-28 RX ORDER — HEPARIN SODIUM 5000 [USP'U]/ML
5000 INJECTION, SOLUTION INTRAVENOUS; SUBCUTANEOUS EVERY 8 HOURS SCHEDULED
Status: DISCONTINUED | OUTPATIENT
Start: 2023-08-28 | End: 2023-08-30 | Stop reason: HOSPADM

## 2023-08-28 RX ORDER — VANCOMYCIN HYDROCHLORIDE 1 G/200ML
1000 INJECTION, SOLUTION INTRAVENOUS EVERY 24 HOURS
Status: DISCONTINUED | OUTPATIENT
Start: 2023-08-28 | End: 2023-08-28

## 2023-08-28 RX ADMIN — IPRATROPIUM BROMIDE 0.5 MG: 0.5 SOLUTION RESPIRATORY (INHALATION) at 13:58

## 2023-08-28 RX ADMIN — SODIUM CHLORIDE SOLN NEBU 3% 4 ML: 3 NEBU SOLN at 07:38

## 2023-08-28 RX ADMIN — HEPARIN SODIUM 5000 UNITS: 5000 INJECTION INTRAVENOUS; SUBCUTANEOUS at 13:42

## 2023-08-28 RX ADMIN — GABAPENTIN 600 MG: 300 CAPSULE ORAL at 17:41

## 2023-08-28 RX ADMIN — GABAPENTIN 600 MG: 300 CAPSULE ORAL at 21:36

## 2023-08-28 RX ADMIN — ACETAMINOPHEN 650 MG: 325 TABLET, FILM COATED ORAL at 21:36

## 2023-08-28 RX ADMIN — BUSPIRONE HYDROCHLORIDE 30 MG: 10 TABLET ORAL at 17:40

## 2023-08-28 RX ADMIN — METHYLPREDNISOLONE SODIUM SUCCINATE 40 MG: 40 INJECTION, POWDER, FOR SOLUTION INTRAMUSCULAR; INTRAVENOUS at 14:27

## 2023-08-28 RX ADMIN — CARVEDILOL 25 MG: 12.5 TABLET, FILM COATED ORAL at 06:32

## 2023-08-28 RX ADMIN — IPRATROPIUM BROMIDE 0.5 MG: 0.5 SOLUTION RESPIRATORY (INHALATION) at 07:38

## 2023-08-28 RX ADMIN — GABAPENTIN 600 MG: 300 CAPSULE ORAL at 08:16

## 2023-08-28 RX ADMIN — HEPARIN SODIUM 5000 UNITS: 5000 INJECTION INTRAVENOUS; SUBCUTANEOUS at 09:34

## 2023-08-28 RX ADMIN — OXYCODONE HYDROCHLORIDE 5 MG: 5 TABLET ORAL at 08:20

## 2023-08-28 RX ADMIN — BUSPIRONE HYDROCHLORIDE 30 MG: 10 TABLET ORAL at 09:34

## 2023-08-28 RX ADMIN — HEPARIN SODIUM 18 UNITS/KG/HR: 10000 INJECTION, SOLUTION INTRAVENOUS at 00:10

## 2023-08-28 RX ADMIN — LEVALBUTEROL HYDROCHLORIDE 1.25 MG: 1.25 SOLUTION RESPIRATORY (INHALATION) at 13:58

## 2023-08-28 RX ADMIN — LEVALBUTEROL HYDROCHLORIDE 1.25 MG: 1.25 SOLUTION RESPIRATORY (INHALATION) at 19:28

## 2023-08-28 RX ADMIN — SODIUM CHLORIDE SOLN NEBU 3% 4 ML: 3 NEBU SOLN at 13:58

## 2023-08-28 RX ADMIN — AMLODIPINE BESYLATE 10 MG: 5 TABLET ORAL at 08:15

## 2023-08-28 RX ADMIN — SODIUM CHLORIDE SOLN NEBU 3% 4 ML: 3 NEBU SOLN at 19:28

## 2023-08-28 RX ADMIN — CARVEDILOL 25 MG: 12.5 TABLET, FILM COATED ORAL at 17:41

## 2023-08-28 RX ADMIN — LISINOPRIL 10 MG: 10 TABLET ORAL at 08:16

## 2023-08-28 RX ADMIN — LEVALBUTEROL HYDROCHLORIDE 1.25 MG: 1.25 SOLUTION RESPIRATORY (INHALATION) at 07:38

## 2023-08-28 RX ADMIN — CEFTRIAXONE SODIUM 1000 MG: 10 INJECTION, POWDER, FOR SOLUTION INTRAVENOUS at 13:24

## 2023-08-28 RX ADMIN — HEPARIN SODIUM 5000 UNITS: 5000 INJECTION INTRAVENOUS; SUBCUTANEOUS at 21:36

## 2023-08-28 RX ADMIN — SERTRALINE HYDROCHLORIDE 50 MG: 50 TABLET ORAL at 08:16

## 2023-08-28 RX ADMIN — IPRATROPIUM BROMIDE 0.5 MG: 0.5 SOLUTION RESPIRATORY (INHALATION) at 19:28

## 2023-08-28 RX ADMIN — BUSPIRONE HYDROCHLORIDE 30 MG: 10 TABLET ORAL at 22:46

## 2023-08-28 RX ADMIN — LORATADINE 10 MG: 10 TABLET ORAL at 08:16

## 2023-08-28 RX ADMIN — FAMOTIDINE 20 MG: 20 TABLET ORAL at 08:16

## 2023-08-28 NOTE — ASSESSMENT & PLAN NOTE
Likely non-MI troponin elevation secondary to pulmonary embolism versus sepsis   Troponin 73, 60, 76  EKG sinus tachycardia 106 no ST-T wave changes  Denies chest pain, palpitations    Plan-  Continue to monitor  telemetry showed no significant events

## 2023-08-28 NOTE — PROGRESS NOTES
Pulmonary progress note  [facility] SERVICE 69 y.o. female MRN: [id number]  Unit/Bed#: ICU 16 Encounter: [id number]      Impressions/Recommendations:  1. Acute hypoxic respiratory failure secondary to suspected pneumonia and mucous plugging  · Not on home oxygen  · Wean as tolerated, keep spO2 greater than 90%  · Continue ipratropium, levalbuterol nebs    2. Multifocal pneumonia (LLL, KARTHIK, RLL) with associated sepsis  · Met SIRS criteria with leukocytosis, tachycardia  · Possibly postviral (COVID in July) or some element of aspiration (notes emesis prior to presentation)  · Continue antibiotics. D4 azithromycin and ceftriaxone, D3 vancomycin  · Likely only need 5 to 7 days of antibiotics  · Follow MRSA nares, if negative can discontinue vancomycin  · Of note, negative procalcitonin x3    3. Emphysema  · Home meds: Albuterol inhaler    4. Left mainstem bronchus mucous plugging  · Consider repeat CXR in the morning. · Consider bronchoscopy if no improvement in CXR or oxygen requirements  · Aggressive pulmonary hygiene: Flutter valve, incentive spirometer, hypertonic saline nebs    5. Mediastinal lymphadenopathy  · Likely repeat imaging in 6 to 12 weeks    6. Tobacco abuse, 30 pack years    7. Questionable pulmonary embolism  · Suspected artifact, bilateral lower extremity venous duplex negative. No need for anticoagulation. · Echocardiogram pending    Subjective:  Patient sitting comfortably in bed. She is on 4 to 5 L NC but unfortunately not on pulse oximeter. No significant overnight events. She is feeling better than when she first came in. Not feeling as short of breath but does feel somewhat she walks although admits she has not been walking much. Reports a lot of sputum production that is dark in color. Review of systems:  12 point review of systems was completed and was otherwise negative except as listed in HPI.       Historical Information   Past Medical History:   Diagnosis Date   • Anxiety    • Depression    • Fatty liver    • Hyperlipidemia    • Hypertension    • Psychiatric disorder    • Varicella      Past Surgical History:   Procedure Laterality Date   • BREAST SURGERY Bilateral     Reduction Procedure   • CARDIAC SURGERY     •  SECTION      x4   • DILATION AND CURETTAGE OF UTERUS     • ECTOPIC PREGNANCY SURGERY       Family History   Problem Relation Age of Onset   • Lung cancer Mother    • Cirrhosis Father    • Hypertension Sister    • Bipolar disorder Sister    • Heart disease Sister    • Diabetes Family    • Heart disease Family    • Hypertension Family    • Stroke Family    • Thyroid disease Family        Meds/Allergies   Current Facility-Administered Medications   Medication Dose Route Frequency   • acetaminophen (TYLENOL) tablet 650 mg  650 mg Oral Q6H PRN   • albuterol (PROVENTIL HFA,VENTOLIN HFA) inhaler 2 puff  2 puff Inhalation Q4H PRN   • amLODIPine (NORVASC) tablet 10 mg  10 mg Oral Daily   • azithromycin (ZITHROMAX) 500 mg in sodium chloride 0.9 % 250 mL IVPB  500 mg Intravenous Q24H   • busPIRone (BUSPAR) tablet 30 mg  30 mg Oral TID   • carvedilol (COREG) tablet 25 mg  25 mg Oral BID With Meals   • ceftriaxone (ROCEPHIN) 1 g/50 mL in dextrose IVPB  1,000 mg Intravenous Q24H   • famotidine (PEPCID) tablet 20 mg  20 mg Oral Daily   • fluticasone (FLONASE) 50 mcg/act nasal spray 2 spray  2 spray Each Nare Daily   • gabapentin (NEURONTIN) capsule 600 mg  600 mg Oral TID   • heparin (porcine) subcutaneous injection 5,000 Units  5,000 Units Subcutaneous Q8H [address]   • ipratropium (ATROVENT) 0.02 % inhalation solution 0.5 mg  0.5 mg Nebulization TID   • levalbuterol (XOPENEX) inhalation solution 1.25 mg  1.25 mg Nebulization TID   • lisinopril (ZESTRIL) tablet 10 mg  10 mg Oral Daily   • loratadine (CLARITIN) tablet 10 mg  10 mg Oral Daily   • nicotine (NICODERM CQ) 14 mg/24hr TD 24 hr patch 1 patch  1 patch Transdermal Daily   • ondansetron (ZOFRAN) injection 4 mg  4 mg Intravenous Q6H PRN   • oxyCODONE (ROXICODONE) IR tablet 5 mg  5 mg Oral BID PRN   • polyethylene glycol (MIRALAX) packet 17 g  17 g Oral Daily PRN   • sertraline (ZOLOFT) tablet 50 mg  50 mg Oral Daily   • sodium chloride 3 % inhalation solution 4 mL  4 mL Nebulization TID     Medications Prior to Admission   Medication   • acetaminophen (TYLENOL) 650 mg CR tablet   • albuterol (PROVENTIL HFA,VENTOLIN HFA) 90 mcg/act inhaler   • amLODIPine (NORVASC) 10 mg tablet   • ASPIRIN-ACETAMINOPHEN-CAFFEINE PO   • busPIRone (BUSPAR) 30 MG tablet   • Calcium Carb-Cholecalciferol (OSCAL-D) 500 mg-200 units per tablet   • carbamide peroxide (DEBROX) 6.5 % otic solution   • carvedilol (COREG) 25 mg tablet   • diclofenac sodium (VOLTAREN) 1 %   • famotidine (PEPCID) 20 mg tablet   • fluticasone (FLONASE) 50 mcg/act nasal spray   • gabapentin (NEURONTIN) 600 MG tablet   • lisinopril (ZESTRIL) 5 mg tablet   • loratadine (CLARITIN) 10 mg tablet   • neomycin-polymyxin-hydrocortisone (CORTISPORIN) 0.35%-10,000 units/mL-1% otic suspension   • NON FORMULARY   • oxyCODONE (OXY-IR) 5 MG capsule   • sertraline (ZOLOFT) 50 mg tablet   • diphenhydrAMINE (BENADRYL) 25 mg tablet   • EPINEPHrine (EPIPEN) 0.3 mg/0.3 mL SOAJ   • naloxone (NARCAN) 4 mg/0.1 mL nasal spray     Allergies   Allergen Reactions   • Methyldopa Shortness Of Breath and Other (See Comments)     Aldomet       Vitals: Blood pressure 150/80, pulse 72, temperature 98.8 °F (37.1 °C), temperature source Oral, resp. rate 19, height 5' 1" (1.549 m), weight 76.7 kg (169 lb), SpO2 97 %. , 5LNC, Body mass index is 31.93 kg/m².       Intake/Output Summary (Last 24 hours) at 8/28/2023 1043  Last data filed at 8/28/2023 0800  Gross per 24 hour   Intake 350.78 ml   Output 750 ml   Net -399.22 ml       Physical exam:    General Appearance:    Alert, cooperative, no conversational dyspnea      Head/eyes:    Normocephalic, without obvious abnormality, atraumatic,         PERRL, extraocular muscles intact, no scleral icterus    Throat:   Moist mucous membranes   Lungs:    CTA bilaterally, no accessory muscle use   Chest Wall:    No tenderness or deformity    Heart:    Regular rate and rhythm, S1 and S2 normal, 2/6 systolic murmur, no rub or gallop   Abdomen:     Soft, non-tender, bowel sounds active all four quadrants,     no masses, no organomegaly   Extremities:   Extremities normal, atraumatic, no cyanosis no edema   Skin:   Warm, dry, turgor normal, no rashes or lesions         Labs: I have personally reviewed pertinent lab results. Imaging and other studies: I have personally reviewed pertinent reports.       Code Status: Level 1 - Full Code      Debra Hall DO

## 2023-08-28 NOTE — PROGRESS NOTES
[facility]  Progress Note  Name: Dion Sahu  MRN: [id number]  Unit/Bed#: ICU 16 I Date of Admission: 8/25/2023   Date of Service: 8/28/2023 I Hospital Day: 3    Assessment/Plan   Abnormal computed tomography angiography (CTA)  Assessment & Plan  CTA chest PE- equivocal embolus in the posterior basal segment of the left lower lung, mucous plugging in the left mainstem bronchus with volume loss in the left lower lobe and left upper lobe, aspiration pneumonia not excluded, Dilatation of pulmonary artery which is indicative of chronic pulmonary hypertension, emphysema, mediastinal hilar adenopathy concerning for neoplasm, Enlarging T12 lesion    CXRAY 8/27/23: Stable volume loss on the left secondary to left lower lobe atelectasis. Plan-  -Appreciate Pulmonology recommendations: recommending cefepime, azithromycin, follow-up on MRSA culture, pulmonary hygiene, flutter valve. - Holding off on broncoscopy as of now. - d/c vancomycin   - d/c heparin gtt due to negative LE doppler  -Hypertonic nebulizers, Atrovent/Xopenex, hold off on ICS  -Xopenex, Atrovent, DuoNebs  -Airway clearance protocol               VTE Pharmacologic Prophylaxis: VTE Score: 6 High Risk (Score >/= 5) - Pharmacological DVT Prophylaxis Ordered: heparin. Sequential Compression Devices Ordered. Patient Centered Rounds: I performed bedside rounds with nursing staff today. Discussions with Specialists or Other Care Team Provider: Pulmonology     Education and Discussions with Family / Patient: Updated  (daughter) via phone. Current Length of Stay: 3 day(s)  Current Patient Status: Inpatient   Discharge Plan: Anticipate discharge in 48-72 hrs to discharge location to be determined pending rehab evaluations. Code Status: Level 1 - Full Code    Subjective:   Ms. Elyssa Xiong is sitting comfortably eating breakfast during evaluation today. She denies SOB and chest pain.  She endorses L sided headache on-and-off, and attributes this to a recent cataract diagnosis. She reports feeling much better than previous. Objective:     Vitals:   Temp (24hrs), Av.4 °F (36.9 °C), Min:98.2 °F (36.8 °C), Max:98.8 °F (37.1 °C)    Temp:  [98.2 °F (36.8 °C)-98.8 °F (37.1 °C)] 98.8 °F (37.1 °C)  HR:  [69-83] 72  Resp:  [14-19] 19  BP: (133-150)/(73-80) 150/80  SpO2:  [92 %-98 %] 98 %  Body mass index is 31.93 kg/m². Input and Output Summary (last 24 hours): Intake/Output Summary (Last 24 hours) at 2023 1443  Last data filed at 2023 1439  Gross per 24 hour   Intake 640.78 ml   Output 1050 ml   Net -409.22 ml       Physical Exam:   Physical Exam  Vitals and nursing note reviewed. Constitutional:       Appearance: Normal appearance. HENT:      Head: Normocephalic and atraumatic. Right Ear: External ear normal.      Left Ear: External ear normal.      Nose: Nose normal.      Mouth/Throat:      Mouth: Mucous membranes are moist.      Pharynx: Oropharynx is clear. Eyes:      Extraocular Movements: Extraocular movements intact. Cardiovascular:      Rate and Rhythm: Normal rate and regular rhythm. Heart sounds: Normal heart sounds. Pulmonary:      Effort: Pulmonary effort is normal.      Comments: Slight wheezes on left    Abdominal:      General: Abdomen is flat. Bowel sounds are normal.      Palpations: Abdomen is soft. Tenderness: There is no abdominal tenderness. Musculoskeletal:      Cervical back: Normal range of motion. Skin:     General: Skin is warm and dry. Neurological:      Mental Status: She is alert and oriented to person, place, and time.    Psychiatric:         Mood and Affect: Mood normal.         Behavior: Behavior normal.        Additional Data:     Labs:  Results from last 7 days   Lab Units 23  0513 23  0442 23  0511   WBC Thousand/uL 8.40   < > 14.17*   HEMOGLOBIN g/dL 12.7   < > 14.0   HEMATOCRIT % 39.7   < > 44.1   PLATELETS Thousands/uL 249 < > 276   NEUTROS PCT %  --   --  80*   LYMPHS PCT %  --   --  10*   MONOS PCT %  --   --  9   EOS PCT %  --   --  0    < > = values in this interval not displayed. Results from last 7 days   Lab Units 08/28/23  0513 08/26/23  0511 08/25/23  1157   SODIUM mmol/L 140   < > 139   POTASSIUM mmol/L 3.7   < > 4.0   CHLORIDE mmol/L 108   < > 107   CO2 mmol/L 27   < > 24   BUN mg/dL 15   < > 15   CREATININE mg/dL 1.17   < > 1.01   ANION GAP mmol/L 5   < > 8   CALCIUM mg/dL 9.2   < > 9.9   ALBUMIN g/dL  --   --  3.6   TOTAL BILIRUBIN mg/dL  --   --  0.31   ALK PHOS U/L  --   --  78   ALT U/L  --   --  6*   AST U/L  --   --  14   GLUCOSE RANDOM mg/dL 92   < > 131    < > = values in this interval not displayed. Results from last 7 days   Lab Units 08/25/23  1339   INR  0.97             Results from last 7 days   Lab Units 08/27/23  1312 08/26/23  0511 08/25/23  2109 08/25/23  2108   LACTIC ACID mmol/L  --   --   --  0.6   PROCALCITONIN ng/ml 0.13 0.15 0.17  --        Lines/Drains:  Invasive Devices     Peripheral Intravenous Line  Duration           Peripheral IV 08/25/23 Distal;Left;Ventral (anterior) Forearm 3 days                      Imaging: Reviewed radiology reports from this admission including: chest xray and LE doppler US    Recent Cultures (last 7 days):   Results from last 7 days   Lab Units 08/25/23  2222 08/25/23  2108 08/25/23 2107   BLOOD CULTURE   --   --  No Growth at 48 hrs. No Growth at 48 hrs.    SPUTUM CULTURE  3+ Growth of Candida albicans*  2+ Growth of  --   --    GRAM STAIN RESULT  No Epithelial cells seen*  No polys seen*  1+ Gram negative rods*  --   --    LEGIONELLA URINARY ANTIGEN   --  Negative  --        Last 24 Hours Medication List:   Current Facility-Administered Medications   Medication Dose Route Frequency Provider Last Rate   • acetaminophen  650 mg Oral Q6H PRN Brittny Baron MD     • albuterol  2 puff Inhalation Q4H PRN Monet Ohara MD     • amLODIPine  10 mg Oral Daily Hector Purcell MD     • busPIRone  30 mg Oral TID Hector Purcell MD     • carvedilol  25 mg Oral BID With Meals Hector Purcell MD     • cefTRIAXone  1,000 mg Intravenous Q24H Hector Purcell MD Stopped (08/28/23 4639)   • famotidine  20 mg Oral Daily Hector Purcell MD     • fluticasone  2 spray Each Nare Daily Hector Purcell MD     • gabapentin  600 mg Oral TID Hector Purcell MD     • heparin (porcine)  5,000 Units Subcutaneous [facility] Simon Oconnell MD     • ipratropium  0.5 mg Nebulization TID Hector Purcell MD     • levalbuterol  1.25 mg Nebulization TID Shari Hill MD     • lisinopril  10 mg Oral Daily Hector Purcell MD     • loratadine  10 mg Oral Daily Hector Purcell MD     • methylPREDNISolone sodium succinate  40 mg Intravenous Daily Natan Rings, DO     • nicotine  1 patch Transdermal Daily Hector Purcell MD     • ondansetron  4 mg Intravenous Q6H PRN Hector Purcell MD     • oxyCODONE  5 mg Oral BID PRN Michel Rojas MD     • polyethylene glycol  17 g Oral Daily PRN Hector Purcell MD     • sertraline  50 mg Oral Daily Hector Purcell MD     • sodium chloride  4 mL Nebulization TID Castro Rockwell MD          Today, Patient Was Seen By: Tejal Fierro MD    **Please Note: This note may have been constructed using a voice recognition system. **

## 2023-08-28 NOTE — PROGRESS NOTES
Keven Dwyer is a 69 y.o. female who is currently ordered Vancomycin IV with management by the Pharmacy Consult service. Relevant clinical data and objective / subjective history reviewed. Vancomycin Assessment:  Indication and Goal AUC/Trough: Pneumonia (goal -600, trough >10)    Micro:     Renal Function:  SCr: 1.17 mg/dL  CrCl: 38 mL/min  Renal replacement: Not on dialysis  Days of Therapy: 3  Current Dose: 1500 mg IV once for loading dose  Vancomycin Plan:  New Dosin mg IV every 12 hours  Estimated AUC: 479 mcg*hr/mL  Estimated Trough: 14.8 mcg/mL  Next Level: 23 0600  Renal Function Monitoring: Daily BMP and [place] will continue to follow closely for s/sx of nephrotoxicity, infusion reactions and appropriateness of therapy. BMP and CBC will be ordered per protocol. We will continue to follow the patient’s culture results and clinical progress daily.     Nereida Madera, Pharmacist

## 2023-08-28 NOTE — CASE MANAGEMENT
Case Management Assessment & Discharge Planning Note    Patient name Wilma Bacon  Location ICU 16/ICU 16 MRN [id number]  : 1955 Date 2023       Current Admission Date: 2023  Current Admission Diagnosis:Sepsis [facility])   Patient Active Problem List    Diagnosis Date Noted   • Acute respiratory failure with hypoxia ([address]) 2023   • Sepsis ([address]) 2023   • (HFpEF) heart failure with preserved ejection fraction ([address]) 2023   • Pulmonary embolism ([address]) 2023   • Acute pain of right knee 2021   • Stage 3a chronic kidney disease ([address]) 2021   • Bone lesion 2021   • Nicotine dependence 06/15/2021   • Bipolar disorder, unspecified ([address]) 06/10/2021   • Morbid (severe) obesity due to excess calories ([address]) 06/10/2021   • Abnormal computed tomography angiography (CTA) 2020   • Screening for colon cancer 07/15/2020   • Angio-edema, initial encounter 07/10/2020   • Elevated troponin 07/10/2020   • Leg cramps, sleep related 2016   • Pain syndrome, chronic 2014   • Pain of lower leg 2013   • 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   • Disc degeneration, lumbar 2013   • Lumbar radiculopathy 2013   • Myofascial pain syndrome 2013   • Osteoarthritis of spine with radiculopathy, lumbar region 2013   • Backache 04/10/2013   • Benign essential hypertension 2012   • Bipolar I disorder, single manic episode ([address]) 2012   • Hyperlipidemia 2012   • Lower back pain 2012      LOS (days): 3  Geometric Mean LOS (GMLOS) (days):   Days to GMLOS:     OBJECTIVE:    Risk of Unplanned Readmission Score: 11.27         Current admission status: Inpatient       Preferred Pharmacy:   [id number] [place] - [address]. [address] [facility]   [place] [address]  Phone: [phone] Fax: [phone] [facility] ([place] - [address] [facility] [address]  Phone: [phone] Fax: [phone]    Primary Care Provider: Kaleigh Dewey MD    Primary Insurance: [place]  Secondary Insurance: [facility] Proxies    There are no active Health Care Proxies on file. Patient Information  Admitted from[de-identified] Home  Mental Status: Alert  During Assessment patient was accompanied by: Not accompanied during assessment  Assessment information provided by[de-identified] Patient  Primary Caregiver: Self  Support Systems: Daughter  [place] of Residence: [address] do you live in?: [facility] entry access options.  Select all that apply.: Stairs  Number of steps to enter home.: 3  Type of Current Residence: 2 story home  Upon entering residence, is there a bedroom on the main floor (no further steps)?: Yes  Upon entering residence, is there a bathroom on the main floor (no further steps)?: Yes  Living Arrangements: Lives w/ Daughter    Activities of Daily Living Prior to Admission  Functional Status: Independent  Completes ADLs independently?: No  Level of ADL dependence: Assistance (min assist for ADLS)  Ambulates independently?: Yes  Does patient use assisted devices?: Yes  Assisted Devices (DME) used: Straight Cane, Wheelchair (borrowed w/c)  Does patient currently own DME?: Yes  What DME does the patient currently own?: Straight Cane  Does patient have a history of Outpatient Therapy (PT/OT)?: No  Does the patient have a history of Short-Term Rehab?: No  Does patient have a history of HHC?: Yes  Does patient currently have [facility]?: No    Patient Information Continued  Income Source: Pension/FDC  Does patient have prescription coverage?: Yes  Does patient receive dialysis treatments?: No  Does patient have a history of substance abuse?: No  Does patient have a history of Mental Health Diagnosis?: Yes  Is patient receiving treatment for mental health?: Yes (with PCP)  Has patient received inpatient treatment related to mental health in the last 2 years?: No    Means of Transportation  Means of Transport to Appts[de-identified] Drives Self  In the past 12 months, has lack of transportation kept you from medical appointments or from getting medications?: No  In the past 12 months, has lack of transportation kept you from meetings, work, or from getting things needed for daily living?: No  Was application for public transport provided?: N/A    DISCHARGE DETAILS:    Discharge planning discussed with[de-identified] pt  Freedom of Choice: Yes  Comments - Freedom of Choice: Return Home    Other Referral/Resources/Interventions Provided:  Interventions: None Indicated (Requested PT/OT consults)

## 2023-08-28 NOTE — UTILIZATION REVIEW
NOTIFICATION OF INPATIENT ADMISSION   AUTHORIZATION REQUEST   SERVICING FACILITY:   [address] [place] [address]  [facility]), [address] [place] [address]  Tax ID: [id number]  NPI: [id number]   ATTENDING PROVIDER:  Attending Name and NPI#: Michel FungGretchen mathew [[id number]]  Address: [address] [place] [address]), [address] [place] [address]  Phone: [phone]     ADMISSION INFORMATION:  [facility]patient [facility] [address] [facility]   Place of Service Code: 21  Inpatient Admission Date/Time: 8/25/23  5:54 PM  Discharge Date/Time: No discharge date for patient encounter. Admitting Diagnosis Code/Description:  Shortness of breath [R06.02]  Emphysema of lung ([address]) [J43.9]  CHF (congestive heart failure) ([address]) [I50.9]  Pulmonary embolism ([address]) [I26.99]  Pulmonary hypertension ([address]) [I27.20]  Acute respiratory failure with hypoxia ([address]) [J96.01]  Lymphadenopathy, hilar [R59.0]  Mucus plugging of bronchi [T17.500A]     UTILIZATION REVIEW CONTACT:  Dale Moody, Utilization   Network Utilization Review Department  Phone: [phone]  Fax: [phone]  Email: [email or web address]  Contact for approvals/pending authorizations, clinical reviews, and discharge. PHYSICIAN ADVISORY SERVICES:  Medical Necessity Denial & [employer] Review  Phone: [phone]  Fax: [phone]  Email: [email or web address]

## 2023-08-28 NOTE — ASSESSMENT & PLAN NOTE
CTA chest PE- equivocal embolus in the posterior basal segment of the left lower lung, mucous plugging in the left mainstem bronchus with volume loss in the left lower lobe and left upper lobe, aspiration pneumonia not excluded, Dilatation of pulmonary artery which is indicative of chronic pulmonary hypertension, emphysema, mediastinal hilar adenopathy concerning for neoplasm, Enlarging T12 lesion    CXRAY 8/27/23: Stable volume loss on the left secondary to left lower lobe atelectasis. Plan-  -Appreciate Pulmonology recommendations: recommending cefepime, azithromycin, follow-up on MRSA culture, pulmonary hygiene, flutter valve. -consider repeat Marquise Little in the AM. If worsening, may do Bronchoscopy. Holding off as of now.   - d/c vancomycin   - d/c heparin gtt due to negative LE doppler  -Hypertonic nebulizers, Atrovent/Xopenex, hold off on ICS  -Xopenex, Atrovent, DuoNebs  -Airway clearance protocol

## 2023-08-28 NOTE — ASSESSMENT & PLAN NOTE
Wt Readings from Last 3 Encounters:   08/28/23 76.7 kg (169 lb)   08/11/23 74.8 kg (165 lb)   07/11/23 71.7 kg (158 lb)   Patient actually complains of recent significant weight loss  Euvolemic on exam  Lung findings on imaging leans more towards a clinical picture of pneumonia rather than CHF exacerbation  Last echo was in 2021 EF 10%, grade 1 diastolic dysfunction    Plan-  Off IVF  Continue to monitor volume status  Repeat echo 8/28/23:  left ventricular ejection fraction is 60%. Systolic function is normal. Wall motion is normal. Diastolic function is mildly abnormal, consistent with grade I (abnormal) relaxation. There is a trivial pericardial effusion along the right atrial free wall.

## 2023-08-28 NOTE — ASSESSMENT & PLAN NOTE
Lab Results   Component Value Date    EGFR 47 08/28/2023    EGFR 79 08/27/2023    EGFR 72 08/26/2023    CREATININE 1.17 08/28/2023    CREATININE 0.77 08/27/2023    CREATININE 0.83 08/26/2023   Creatinine baseline appears to be around 1.5  Does not appear to be in acute kidney injury at this time    Plan-  Monitor I's/O  Monitor BMP  Avoid nephrotoxins  Avoid hypotension

## 2023-08-28 NOTE — ASSESSMENT & PLAN NOTE
Initially presenting due to worsening shortness of breath and productive cough  D-dimer 0.99    CTA chest PE study-equivocal embolus posterior basal segment of the left lower lung  CT findings also suspicious for possible neoplasm in the setting of mediastinal/hilar adenopathy  ED started patient on heparin drip  Patient has no history of DVT or PE, family history of clotting disorders, patient notes that she is up-to-date with all screenings  Current tobacco user 1 pack/day    Plan-  D/c heparin gtt due to negative LE doppler  Appreciate pulmonology recommendations  Will eventually  require a malignancy work-up

## 2023-08-28 NOTE — ASSESSMENT & PLAN NOTE
Concern for Sepsis POA as evidenced by tachycardia 124, leukocytosis 17.9  Suspected source: Pneumonia with mucous plug    Recent Labs     08/25/23  2108   LACTICACID 0.6     CT CAP- mucous plugging in left mainstem bronchus with volume loss in left lower lobe/left upper lobe  Chest x-ray-increased cardiomegaly, left basilar infiltrate  Procal- Neg   Lactate- neg   Legionella strep pneumo antigen negative  Sputum culture-1+ gram-negative rods, no polys  Blood culture-negative  S/P Lasix 40 IV 1 dose yesterday, in setting of possible fluid overload    Plan-  Ceftriaxone and azithromycin Day 4  D/c vanc today  Repeat chest x-ray-Stable volume loss on the left secondary to left lower lobe atelectasis.   Appreciate pulmonology recommendations

## 2023-08-29 LAB
ANION GAP SERPL CALCULATED.3IONS-SCNC: 7 MMOL/L
BUN SERPL-MCNC: 21 MG/DL (ref 5–25)
CALCIUM SERPL-MCNC: 10 MG/DL (ref 8.4–10.2)
CHLORIDE SERPL-SCNC: 106 MMOL/L (ref 96–108)
CO2 SERPL-SCNC: 27 MMOL/L (ref 21–32)
CREAT SERPL-MCNC: 1.22 MG/DL (ref 0.6–1.3)
ERYTHROCYTE [DISTWIDTH] IN BLOOD BY AUTOMATED COUNT: 14.7 % (ref 11.6–15.1)
GFR SERPL CREATININE-BSD FRML MDRD: 45 ML/MIN/1.73SQ M
GLUCOSE SERPL-MCNC: 135 MG/DL (ref 65–140)
HCT VFR BLD AUTO: 44.1 % (ref 34.8–46.1)
HGB BLD-MCNC: 14.1 G/DL (ref 11.5–15.4)
MCH RBC QN AUTO: 30.4 PG (ref 26.8–34.3)
MCHC RBC AUTO-ENTMCNC: 32 G/DL (ref 31.4–37.4)
MCV RBC AUTO: 95 FL (ref 82–98)
PLATELET # BLD AUTO: 303 THOUSANDS/UL (ref 149–390)
PMV BLD AUTO: 9.2 FL (ref 8.9–12.7)
POTASSIUM SERPL-SCNC: 4 MMOL/L (ref 3.5–5.3)
RBC # BLD AUTO: 4.64 MILLION/UL (ref 3.81–5.12)
SODIUM SERPL-SCNC: 140 MMOL/L (ref 135–147)
WBC # BLD AUTO: 8.67 THOUSAND/UL (ref 4.31–10.16)

## 2023-08-29 PROCEDURE — 97116 GAIT TRAINING THERAPY: CPT

## 2023-08-29 PROCEDURE — 94669 MECHANICAL CHEST WALL OSCILL: CPT

## 2023-08-29 PROCEDURE — 99232 SBSQ HOSP IP/OBS MODERATE 35: CPT | Performed by: INTERNAL MEDICINE

## 2023-08-29 PROCEDURE — 94640 AIRWAY INHALATION TREATMENT: CPT

## 2023-08-29 PROCEDURE — 94760 N-INVAS EAR/PLS OXIMETRY 1: CPT

## 2023-08-29 PROCEDURE — 80048 BASIC METABOLIC PNL TOTAL CA: CPT

## 2023-08-29 PROCEDURE — 97163 PT EVAL HIGH COMPLEX 45 MIN: CPT

## 2023-08-29 PROCEDURE — 85027 COMPLETE CBC AUTOMATED: CPT

## 2023-08-29 PROCEDURE — 94668 MNPJ CHEST WALL SBSQ: CPT

## 2023-08-29 RX ORDER — PREDNISONE 20 MG/1
40 TABLET ORAL DAILY
Status: DISCONTINUED | OUTPATIENT
Start: 2023-08-30 | End: 2023-08-30 | Stop reason: HOSPADM

## 2023-08-29 RX ORDER — ECHINACEA PURPUREA EXTRACT 125 MG
1 TABLET ORAL
Status: DISCONTINUED | OUTPATIENT
Start: 2023-08-29 | End: 2023-08-30 | Stop reason: HOSPADM

## 2023-08-29 RX ADMIN — BUSPIRONE HYDROCHLORIDE 30 MG: 10 TABLET ORAL at 21:18

## 2023-08-29 RX ADMIN — LORATADINE 10 MG: 10 TABLET ORAL at 09:03

## 2023-08-29 RX ADMIN — LISINOPRIL 10 MG: 10 TABLET ORAL at 09:02

## 2023-08-29 RX ADMIN — LEVALBUTEROL HYDROCHLORIDE 1.25 MG: 1.25 SOLUTION RESPIRATORY (INHALATION) at 13:06

## 2023-08-29 RX ADMIN — SODIUM CHLORIDE SOLN NEBU 3% 4 ML: 3 NEBU SOLN at 13:06

## 2023-08-29 RX ADMIN — GABAPENTIN 600 MG: 300 CAPSULE ORAL at 15:51

## 2023-08-29 RX ADMIN — HEPARIN SODIUM 5000 UNITS: 5000 INJECTION INTRAVENOUS; SUBCUTANEOUS at 21:18

## 2023-08-29 RX ADMIN — HEPARIN SODIUM 5000 UNITS: 5000 INJECTION INTRAVENOUS; SUBCUTANEOUS at 06:33

## 2023-08-29 RX ADMIN — LEVALBUTEROL HYDROCHLORIDE 1.25 MG: 1.25 SOLUTION RESPIRATORY (INHALATION) at 07:22

## 2023-08-29 RX ADMIN — LEVALBUTEROL HYDROCHLORIDE 1.25 MG: 1.25 SOLUTION RESPIRATORY (INHALATION) at 19:22

## 2023-08-29 RX ADMIN — GABAPENTIN 600 MG: 300 CAPSULE ORAL at 21:18

## 2023-08-29 RX ADMIN — CARVEDILOL 25 MG: 12.5 TABLET, FILM COATED ORAL at 15:51

## 2023-08-29 RX ADMIN — OXYCODONE HYDROCHLORIDE 5 MG: 5 TABLET ORAL at 09:02

## 2023-08-29 RX ADMIN — BUSPIRONE HYDROCHLORIDE 30 MG: 10 TABLET ORAL at 09:02

## 2023-08-29 RX ADMIN — CARVEDILOL 25 MG: 12.5 TABLET, FILM COATED ORAL at 06:33

## 2023-08-29 RX ADMIN — FAMOTIDINE 20 MG: 20 TABLET ORAL at 09:03

## 2023-08-29 RX ADMIN — SODIUM CHLORIDE SOLN NEBU 3% 4 ML: 3 NEBU SOLN at 07:22

## 2023-08-29 RX ADMIN — IPRATROPIUM BROMIDE 0.5 MG: 0.5 SOLUTION RESPIRATORY (INHALATION) at 07:22

## 2023-08-29 RX ADMIN — OXYCODONE HYDROCHLORIDE 5 MG: 5 TABLET ORAL at 21:25

## 2023-08-29 RX ADMIN — FLUTICASONE PROPIONATE 2 SPRAY: 50 SPRAY, METERED NASAL at 09:03

## 2023-08-29 RX ADMIN — SALINE NASAL SPRAY 1 SPRAY: 1.5 SOLUTION NASAL at 19:22

## 2023-08-29 RX ADMIN — BUSPIRONE HYDROCHLORIDE 30 MG: 10 TABLET ORAL at 15:51

## 2023-08-29 RX ADMIN — HEPARIN SODIUM 5000 UNITS: 5000 INJECTION INTRAVENOUS; SUBCUTANEOUS at 13:40

## 2023-08-29 RX ADMIN — AMLODIPINE BESYLATE 10 MG: 5 TABLET ORAL at 09:02

## 2023-08-29 RX ADMIN — IPRATROPIUM BROMIDE 0.5 MG: 0.5 SOLUTION RESPIRATORY (INHALATION) at 13:06

## 2023-08-29 RX ADMIN — METHYLPREDNISOLONE SODIUM SUCCINATE 40 MG: 40 INJECTION, POWDER, FOR SOLUTION INTRAMUSCULAR; INTRAVENOUS at 09:03

## 2023-08-29 RX ADMIN — NICOTINE 1 PATCH: 14 PATCH, EXTENDED RELEASE TRANSDERMAL at 12:11

## 2023-08-29 RX ADMIN — GABAPENTIN 600 MG: 300 CAPSULE ORAL at 09:02

## 2023-08-29 RX ADMIN — CEFTRIAXONE SODIUM 1000 MG: 10 INJECTION, POWDER, FOR SOLUTION INTRAVENOUS at 13:40

## 2023-08-29 RX ADMIN — IPRATROPIUM BROMIDE 0.5 MG: 0.5 SOLUTION RESPIRATORY (INHALATION) at 19:22

## 2023-08-29 RX ADMIN — SERTRALINE HYDROCHLORIDE 50 MG: 50 TABLET ORAL at 09:03

## 2023-08-29 NOTE — CONSULTS
Visited w/Pt for about 25 minutes on 8/29/23. Pt stated she was in some distress due to some issues ongoing with her family and the acceptance she fears the community around them does not give. This distresses her as a mother and grandmother. Pt also stated she fears and is dismayed by the violence and hostility in the world, generally. Finally, Pt stated she fears that, if her prognosis is not good, she will suffer pain. We spoke about God's love and its universality, about forgiveness (including self-forgiveness . . . Pt told a story about a recent "transgression" she feels remorse over), and acceptance and peace. Pt seemed encouraged and soothed by our discussion. Pt asked for prayer (provided).

## 2023-08-29 NOTE — PLAN OF CARE
Problem: Potential for Falls  Goal: Patient will remain free of falls  Description: INTERVENTIONS:  - Educate patient/family on patient safety including physical limitations  - Instruct patient to call for assistance with activity   - Consult OT/PT to assist with strengthening/mobility   - Keep Call bell within reach  - Keep bed low and locked with side rails adjusted as appropriate  - Keep care items and personal belongings within reach  - Initiate and maintain comfort rounds  - Make Fall Risk Sign visible to staff  - Offer Toileting every Hours, in advance of need  - Initiate/Maintain alarm  - Obtain necessary fall risk management equipment:   - Apply yellow socks and bracelet for high fall risk patients  - Consider moving patient to room near nurses station  8/29/2023 1229 by Ananda Baez RN  Outcome: Progressing  8/29/2023 1228 by Ananda Baez RN  Outcome: Progressing     Problem: MOBILITY - ADULT  Goal: Maintain or return to baseline ADL function  Description: INTERVENTIONS:  -  Assess patient's ability to carry out ADLs; assess patient's baseline for ADL function and identify physical deficits which impact ability to perform ADLs (bathing, care of mouth/teeth, toileting, grooming, dressing, etc.)  - Assess/evaluate cause of self-care deficits   - Assess range of motion  - Assess patient's mobility; develop plan if impaired  - Assess patient's need for assistive devices and provide as appropriate  - Encourage maximum independence but intervene and supervise when necessary  - Involve family in performance of ADLs  - Assess for home care needs following discharge   - Consider OT consult to assist with ADL evaluation and planning for discharge  - Provide patient education as appropriate  8/29/2023 1229 by Ananda Baez RN  Outcome: Progressing  8/29/2023 1228 by Ananda Baez RN  Outcome: Progressing  Goal: Maintains/Returns to pre admission functional level  Description: INTERVENTIONS:  - Perform BMAT or MOVE assessment daily.   - Set and communicate daily mobility goal to care team and patient/family/caregiver. - Collaborate with rehabilitation services on mobility goals if consulted  - Perform Range of Motion  times a day. - Reposition patient every  hours.   - Dangle patient  times a day  - Stand patient  times a day  - Ambulate patient  times a day  - Out of bed to chair  times a day   - Out of bed for meals  times a day  - Out of bed for toileting  - Record patient progress and toleration of activity level   8/29/2023 1229 by Jessica Larios RN  Outcome: Progressing  8/29/2023 1228 by Jessica Larios, RN  Outcome: Progressing

## 2023-08-29 NOTE — ASSESSMENT & PLAN NOTE
Complaining of shortness of breath and productive cough that has been progressively worsening over the past month  Prior to arrival she was severely short of breath at rest which prompted her to come into the ED  On arrival patient was saturating 84% on room air then escalated to 15 L and ultimately a nonrebreather      Plan-  See sepsis secondary to pneumonia  Pt currently on RA  Atrovent/Xopenex, hold off ICS  High frequency chest wall oscillation, flutter valve therapy  Begin prednisone 40 mg daily (day 3 of steroids)  Discharge on [facility] per pulmonology   Repeat CT chest 6-8 weeks

## 2023-08-29 NOTE — ASSESSMENT & PLAN NOTE
Wt Readings from Last 3 Encounters:   08/29/23 76.6 kg (168 lb 14 oz)   08/11/23 74.8 kg (165 lb)   07/11/23 71.7 kg (158 lb)   Patient actually complains of recent significant weight loss  Euvolemic on exam  Lung findings on imaging leans more towards a clinical picture of pneumonia rather than CHF exacerbation  Last echo was in 2021 EF 79%, grade 1 diastolic dysfunction    Plan-  Off IVF  Continue to monitor volume status  Repeat echo 8/28/23:  left ventricular ejection fraction is 60%. Systolic function is normal. Wall motion is normal. Diastolic function is mildly abnormal, consistent with grade I (abnormal) relaxation. There is a trivial pericardial effusion along the right atrial free wall.

## 2023-08-29 NOTE — ASSESSMENT & PLAN NOTE
Wt Readings from Last 3 Encounters:   08/30/23 73.3 kg (161 lb 9.6 oz)   08/11/23 74.8 kg (165 lb)   07/11/23 71.7 kg (158 lb)   Patient actually complains of recent significant weight loss  Euvolemic on exam  Lung findings on imaging leans more towards a clinical picture of pneumonia rather than CHF exacerbation  Last echo was in 2021 EF 97%, grade 1 diastolic dysfunction    Plan-  Off IVF  Continue to monitor volume status  Repeat echo 8/28/23:  left ventricular ejection fraction is 60%. Systolic function is normal. Wall motion is normal. Diastolic function is mildly abnormal, consistent with grade I (abnormal) relaxation. There is a trivial pericardial effusion along the right atrial free wall.

## 2023-08-29 NOTE — ASSESSMENT & PLAN NOTE
Lab Results   Component Value Date    EGFR 45 08/29/2023    EGFR 47 08/28/2023    EGFR 79 08/27/2023    CREATININE 1.22 08/29/2023    CREATININE 1.17 08/28/2023    CREATININE 0.77 08/27/2023   Creatinine baseline appears to be around 1.5  Does not appear to be in acute kidney injury at this time    Plan-  Monitor I's/O  Monitor BMP  Avoid nephrotoxins  Avoid hypotension

## 2023-08-29 NOTE — ASSESSMENT & PLAN NOTE
Complaining of shortness of breath and productive cough that has been progressively worsening over the past month  Prior to arrival she was severely short of breath at rest which prompted her to come into the ED  On arrival patient was saturating 84% on room air then escalated to 15 L and ultimately a nonrebreather      Plan-  See sepsis secondary to pneumonia  Continue to wean O2 as appropriate  Hypertonic nebs, Atrovent/Xopenex, hold off ICS  High frequency chest wall oscillation, flutter valve therapy  Solumedrol 40 (day 1)  Repeat CT chest 6-8 weeks

## 2023-08-29 NOTE — PHYSICAL THERAPY NOTE
PHYSICAL THERAPY EVALUATION/TREATMENT     08/29/23 1020   PT Last Visit   PT Visit Date 08/29/23   Note Type   Note type Evaluation   Pain Assessment   Pain Assessment Tool 0-10   Pain Score 4   Pain Location/Orientation Orientation: Bilateral;Orientation: Lower; Location: Back; Location: Knee   Pain Onset/Description Frequency: Constant/Continuous; Onset: Ongoing   Effect of Pain on Daily Activities Limits comfort and mobility   Patient's Stated Pain Goal No pain   Hospital Pain Intervention(s) Repositioned; Ambulation/increased activity; Emotional support; Rest   Restrictions/Precautions   Other Precautions Fall Risk;Bed Alarm; Chair Alarm;Pain  (Chance)   Home Living   Type of [address] [place] [address] Two level; Able to live on main level with bedroom/bathroom; Performs ADLs on one level;Stairs to enter with rails  (First-floor set up; 3 steps to enter)   Bathroom Shower/Tub Tub/shower unit   Bathroom Toilet Standard   Bathroom Equipment   (None per patient)   Bathroom Accessibility Accessible   Home Equipment Quad cane   Prior Function   Level of [place] Needs assistance with ADLs; Independent with functional mobility; Needs assistance with IADLS   Lives With Daughter  (Daughter works)   Receives Help From Family   IADLs Family/Friend/Other provides transportation; Family/Friend/Other provides meals; Family/Friend/Other provides medication management  (Patient states she does drive very short distances)   Falls in the last 6 months 1 to 4  (2 per patient)   Comments Patient ambulates with a quad cane prior to admission. Patient uses an electric cart when at the store. General   Additional Pertinent History Patient is admitted with progressively worsening shortness of breath, productive cough and congestion.    Family/Caregiver Present No   Cognition   Overall Cognitive Status WFL   Arousal/Participation Cooperative   Orientation Level Oriented X4   Following Commands Follows multistep commands with increased Subjective:       Patient ID: Zander Tsang is a 36 y.o. male.    Chief Complaint: Annual Exam    HPI:  Pleasant 36-year-old  presents today to establish care.  The patient has been having some GI issues ongoing.  He is had some reflux symptoms and mild dysphagia for which he was endoscoped in yeast in about 3-4 years ago.  He tested negative for infectious disease.  Nothing much was found.  He also has intermittent diarrhea where he almost can not control his bowels.  I do recommend Gastroenterology consultation for a visit as to what might be the next best step.  For his GERDs taking low-dose omeprazole but not every day.  I do recommend he try omeprazole 40 mg every day and see how he feels over the next several weeks.  Report any solid-food dysphagia.    Review of Systems   Constitutional: Negative.    HENT: Negative.     Eyes: Negative.    Respiratory: Negative.     Cardiovascular: Negative.    Gastrointestinal:  Positive for diarrhea.   Endocrine: Negative.    Genitourinary: Negative.    Musculoskeletal: Negative.    Skin: Negative.    Allergic/Immunologic: Negative.    Neurological: Negative.    Hematological: Negative.    Psychiatric/Behavioral: Negative.       Objective:      Vitals:    06/07/23 1335   BP: 118/72   BP Location: Left arm   Patient Position: Sitting   Pulse: 71   SpO2: 97%   Weight: 61.5 kg (135 lb 9.3 oz)      Physical Exam  Vitals and nursing note reviewed.   Constitutional:       Appearance: Normal appearance. He is obese.   HENT:      Head: Normocephalic and atraumatic.      Nose: Nose normal.      Mouth/Throat:      Mouth: Mucous membranes are moist.      Pharynx: Oropharynx is clear.   Eyes:      Extraocular Movements: Extraocular movements intact.      Conjunctiva/sclera: Conjunctivae normal.      Pupils: Pupils are equal, round, and reactive to light.   Cardiovascular:      Rate and Rhythm: Normal rate and regular rhythm.   Pulmonary:      Effort: Pulmonary  effort is normal.      Breath sounds: Normal breath sounds.   Abdominal:      General: Abdomen is flat. Bowel sounds are normal.      Palpations: Abdomen is soft.   Musculoskeletal:         General: Normal range of motion.      Cervical back: Normal range of motion and neck supple.   Skin:     General: Skin is warm and dry.      Capillary Refill: Capillary refill takes less than 2 seconds.   Neurological:      General: No focal deficit present.      Mental Status: He is alert and oriented to person, place, and time.   Psychiatric:         Mood and Affect: Mood normal.         Behavior: Behavior normal.         Thought Content: Thought content normal.         Judgment: Judgment normal.       No results found for this or any previous visit.   Assessment:       1. Wellness examination    2. Gastroesophageal reflux disease without esophagitis    3. Diarrhea, unspecified type        Plan:       Wellness examination  -     Hemoglobin A1C; Future; Expected date: 06/07/2023  -     CBC Auto Differential; Future; Expected date: 06/07/2023  -     Comprehensive Metabolic Panel; Future; Expected date: 06/07/2023  -     HIV 1/2 Ag/Ab (4th Gen); Future; Expected date: 06/07/2023  -     Hepatitis C Antibody; Future; Expected date: 06/07/2023  -     Lipid Panel; Future; Expected date: 06/07/2023    Gastroesophageal reflux disease without esophagitis  -     Ambulatory referral/consult to Gastroenterology; Future; Expected date: 06/14/2023  -     omeprazole (PRILOSEC) 40 MG capsule; Take 1 capsule (40 mg total) by mouth once daily.  Dispense: 90 capsule; Refill: 3    Diarrhea, unspecified type  -     Ambulatory referral/consult to Gastroenterology; Future; Expected date: 06/14/2023          Medication List with Changes/Refills   New Medications    OMEPRAZOLE (PRILOSEC) 40 MG CAPSULE    Take 1 capsule (40 mg total) by mouth once daily.   Current Medications    AZELASTINE (ASTELIN) 137 MCG (0.1 %) NASAL SPRAY    SMARTSIG:Both Nares  time or repetition   Comments At least 2 patient identifiers including name and date of birth   Subjective   Subjective "I get out of breath when I walk"   RLE Assessment   RLE Assessment WFL  (Grossly 3-3+/5)   LLE Assessment   LLE Assessment WFL  (Grossly 3-3+/5)   Bed Mobility   Supine to Sit 6  Modified independent   Additional items Increased time required;HOB elevated   Transfers   Sit to Stand 4  Minimal assistance   Additional items Assist x 1;Verbal cues; Increased time required  (Cues for safe hand placement)   Stand to Sit 4  Minimal assistance   Additional items Assist x 1;Verbal cues; Increased time required  (Cues for safe hand placement)   Ambulation/Elevation   Gait pattern Antalgic;Decreased R stance; Short stride; Step to; Foward flexed;Decreased heel strike;Decreased foot clearance   Gait Assistance 4  Minimal assist   Additional items Assist x 1;Verbal cues; Tactile cues   Assistive Device [facility]  (Patient normally uses a quad cane, no quad cane available on W4 at time of PT evaluation and patient does not have her quad cane with her)   Distance 10 to 12 feet with change in direction   Balance   Static Sitting Good   Static Standing   (F-/fair with cane)   Ambulatory   (P+/F- with cane)   Endurance Deficit   Endurance Deficit Yes   Endurance Deficit Description Limited standing and gait endurance   Activity Tolerance   Activity Tolerance Patient limited by fatigue;Patient limited by pain;Treatment limited secondary to medical complications (Comment)  (Minimal shortness of breath)   Assessment   Problem List Decreased strength;Decreased endurance; Impaired balance;Decreased mobility; Decreased safety awareness;Pain   Assessment Patient seen for Physical Therapy evaluation. Patient admitted with Sepsis ([address]).   Comorbidities affecting patient's physical performance include: Hypertension, CKD 3, acute respiratory failure with hypoxia, PE history, bipolar 1, lumbar disc degeneration, hyperlipidemia, lumbar   Discontinued Medications    OMEPRAZOLE (PRILOSEC) 20 MG CAPSULE    Take 20 mg by mouth once daily.         radiculopathy, myofascial pain syndrome, chronic pain syndrome, OA. Personal factors affecting patient at time of initial evaluation include: lives in two story house, ambulating with assistive device, stairs to enter home, inability to navigate community distances, inability to navigate level surfaces without external assistance, inability to perform dynamic tasks in community and positive fall history. Prior to admission, patient was independent with functional mobility with quad cane, requiring assist for ADLS, requiring assist for IADLS, living with dtr in a two level home with 3 steps to enter, ambulating household distance and lives in a multilevel house but has 1st floor setup. Please find objective findings from Physical Therapy assessment regarding body systems outlined above with impairments and limitations including weakness, impaired balance, decreased endurance, gait deviations, pain, decreased activity tolerance, decreased functional mobility tolerance, decreased safety awareness, fall risk and SOB upon exertion. The Barthel Index was used as a functional outcome tool presenting with a score of Barthel Index Score: 55 today indicating marked limitations of functional mobility and ADLS. Patient's clinical presentation is currently unstable/unpredictable as seen in patient's presentation of vital sign response, changing level of pain, increased fall risk, new onset of impairment of functional mobility, decreased endurance and new onset of weakness. Pt would benefit from continued Physical Therapy treatment to address deficits as defined above and maximize level of functional mobility. As demonstrated by objective findings, the assigned level of complexity for this evaluation is high. The patient's -[facility] Basic Mobility Inpatient Short Form Raw Score is 18. A Raw score of greater than 16 suggests the patient may benefit from discharge to home.  Please also refer to the recommendation of the Physical Therapist for safe discharge planning. Goals   Patient Goals To feel better and go home   STG Expiration Date 09/08/23   Short Term Goal #1 Patient will: Increase bilateral LE strength 1 grade to facilitate independent mobility, Perform all bed mobility tasks independently to improve pt's independence w/ repositioning for decrease risk of skin breakdown, Perform all transfers independently consistently from various height surfaces in order to improve I w/ engagement w/ real-world environments/situations, Ambulate at least 100 ft. with least restrictive assistive device independently w/o LOB to facilitate return and engagement w/ previous living environment, Navigate 3 stairs w/ supervision with unilateral handrail to either improve independence w/ entering home and/or so patient can fully access living areas in home, Increase ambulatory and static and dynamic standing balance 1 grade to decrease risk for falls, Tolerate at least 15 consecutive minutes of activity to demonstrate improved activity tolerance and endurance and Tolerate 3 hr OOB to faciliate upright tolerance   Plan   Treatment/Interventions ADL retraining;Functional transfer training;LE strengthening/ROM; Elevations; Therapeutic exercise; Endurance training;Patient/family training;Equipment eval/education; Bed mobility;Gait training; Compensatory technique education;OT;Spoke to case management   PT Frequency 3-5x/wk   Recommendation   PT Discharge Recommendation Home with home health rehabilitation   Equipment Recommended [address] [place]   AM-PAC Basic Mobility Inpatient   Turning in Flat Bed Without Bedrails 4   Lying on Back to Sitting on Edge of Flat Bed Without Bedrails 3   Moving Bed to Chair 3   Standing Up From Chair Using Arms 3   Walk in Room 3   Climb 3-5 Stairs With Railing 2   Basic Mobility Inpatient Raw Score 18   Basic Mobility Standardized Score 41.05   Highest Level Of Mobility   -[facility] Goal 6: Walk 10 steps or more   -HLM Achieved 6: Walk 10 steps or more   Barthel Index   Feeding 10   Bathing 0   Grooming Score 0   Dressing Score 5   Bladder Score 10   Bowels Score 10   Toilet Use Score 5   Transfers (Bed/Chair) Score 10   Mobility (Level Surface) Score 0   Stairs Score 5   Barthel Index Score 55   Additional Treatment Session   Start Time 1020   End Time 1035   Treatment Assessment Patient agreeable to additional ambulation with trial of roller walker. Patient transfers sit to stand with close supervision and cues for safe hand placement and ambulates with a roller walker 50 feet with change in direction with standby assist/close supervision and cues for gait patterning, roller walker placement, control and direction. Patient transfers stand to sit with cues for safe hand placement with close supervision. A: patient with fairly good tolerance to PT evaluation and treatment, gait much improved with a roller walker versus a single-point cane (patient normally uses a quad cane at baseline). Gait deviations significantly decreased with use of roller walker versus single-point cane. Patient will continue to benefit from skilled physical therapy services to increase her strength, balance, endurance, functional mobility and gait. At this time, patient will benefit from continued ambulation with a roller walker with progression to quad cane as able/tolerated. When medically stable for discharge, patient will need a roller walker for home use and home PT is also recommended. End of Consult   Patient Position at End of Consult Supine; All needs within reach;Bed/Chair alarm activated   Licensure   [place] License Number  [id number] [place], [place] [id number]   Portions of the documentation may have been created using voice recognition software. Occasional wrong word or sound alike substitutions may have occurred due to the inherent limitation of the voice recognition software.  Read the chart carefully and recognize, using context, where substitutions have occurred.

## 2023-08-29 NOTE — PROGRESS NOTES
Pulmonary Progress Note  Urbano Samples 69 y.o. female MRN: [id number]  Unit/Bed#: W -01 Encounter: [id number]      Impressions/Recommendations:  1. Acute hypoxic respiratory failure secondary to suspected pneumonia and mucous plugging  ? Not on home oxygen  ? Currently off oxygen with spO2 around 91-95% with occasional dips to 86%  ? Wean as tolerated, keep spO2 greater than 88%  ? Continue ipratropium, levalbuterol nebs  ? Recommend home O2 evaluation prior to discharge     2.  Multifocal pneumonia (LLL, KARTHIK, RLL) with associated sepsis  ? Met SIRS criteria with leukocytosis, tachycardia  ? Possibly postviral (COVID in July) or some element of aspiration (notes emesis prior to presentation)  ? Continue ceftriaxone, D5 of 7  ? Of note, negative procalcitonin x3     3. Emphysema with suspected COPD  ? Home meds: Albuterol inhaler  ? Continue Solu-medrol 40mg daily for now     4. Left mainstem bronchus mucous plugging  ? Aggressive pulmonary hygiene: Flutter valve, incentive spirometer, hypertonic saline nebs, percussion vest  ? Patient is expectorating sputum well, no need for bronchoscopy at this time     5. Mediastinal lymphadenopathy  ? Repeat imaging in 6-8 weeks     6. Tobacco abuse, 30 pack years     7. Abnormal TTE  ? EF 55%, grade 1 diastolic dysfunction, moderate mitral regurg with ABHISHEK    Subjective:  Patient sitting in bed. She says she is feeling better today compared to yesterday. Still coughing up sputum but the color is clearing up and is lesser in volume. Getting out of bed and ambulating, feeling weak but not particularly SOB. Her oxygen was removed during our visit and her oxygen saturation maintained 91-95% with occasional dips to 86% with quick recovery. Review of systems:  12 point review of systems was completed and was otherwise negative except as listed in HPI.       Historical Information   Past Medical History:   Diagnosis Date   • Anxiety    • Depression    • Fatty liver    • Hyperlipidemia    • Hypertension    • Psychiatric disorder    • Varicella      Past Surgical History:   Procedure Laterality Date   • BREAST SURGERY Bilateral     Reduction Procedure   • CARDIAC SURGERY     •  SECTION      x4   • DILATION AND CURETTAGE OF UTERUS     • ECTOPIC PREGNANCY SURGERY       Family History   Problem Relation Age of Onset   • Lung cancer Mother    • Cirrhosis Father    • Hypertension Sister    • Bipolar disorder Sister    • Heart disease Sister    • Diabetes Family    • Heart disease Family    • Hypertension Family    • Stroke Family    • Thyroid disease Family          Meds/Allergies   Current Facility-Administered Medications   Medication Dose Route Frequency   • acetaminophen (TYLENOL) tablet 650 mg  650 mg Oral Q6H PRN   • albuterol (PROVENTIL HFA,VENTOLIN HFA) inhaler 2 puff  2 puff Inhalation Q4H PRN   • amLODIPine (NORVASC) tablet 10 mg  10 mg Oral Daily   • busPIRone (BUSPAR) tablet 30 mg  30 mg Oral TID   • carvedilol (COREG) tablet 25 mg  25 mg Oral BID With Meals   • ceftriaxone (ROCEPHIN) 1 g/50 mL in dextrose IVPB  1,000 mg Intravenous Q24H   • famotidine (PEPCID) tablet 20 mg  20 mg Oral Daily   • fluticasone (FLONASE) 50 mcg/act nasal spray 2 spray  2 spray Each Nare Daily   • gabapentin (NEURONTIN) capsule 600 mg  600 mg Oral TID   • heparin (porcine) subcutaneous injection 5,000 Units  5,000 Units Subcutaneous Q8H [address]   • ipratropium (ATROVENT) 0.02 % inhalation solution 0.5 mg  0.5 mg Nebulization TID   • levalbuterol (XOPENEX) inhalation solution 1.25 mg  1.25 mg Nebulization TID   • lisinopril (ZESTRIL) tablet 10 mg  10 mg Oral Daily   • loratadine (CLARITIN) tablet 10 mg  10 mg Oral Daily   • methylPREDNISolone sodium succinate (Solu-MEDROL) injection 40 mg  40 mg Intravenous Daily   • nicotine (NICODERM CQ) 14 mg/24hr TD 24 hr patch 1 patch  1 patch Transdermal Daily   • ondansetron (ZOFRAN) injection 4 mg  4 mg Intravenous Q6H PRN   • oxyCODONE (ROXICODONE) IR tablet 5 mg  5 mg Oral BID PRN   • polyethylene glycol (MIRALAX) packet 17 g  17 g Oral Daily PRN   • sertraline (ZOLOFT) tablet 50 mg  50 mg Oral Daily   • sodium chloride 3 % inhalation solution 4 mL  4 mL Nebulization TID     Medications Prior to Admission   Medication   • acetaminophen (TYLENOL) 650 mg CR tablet   • albuterol (PROVENTIL HFA,VENTOLIN HFA) 90 mcg/act inhaler   • amLODIPine (NORVASC) 10 mg tablet   • ASPIRIN-ACETAMINOPHEN-CAFFEINE PO   • busPIRone (BUSPAR) 30 MG tablet   • Calcium Carb-Cholecalciferol (OSCAL-D) 500 mg-200 units per tablet   • carbamide peroxide (DEBROX) 6.5 % otic solution   • carvedilol (COREG) 25 mg tablet   • diclofenac sodium (VOLTAREN) 1 %   • famotidine (PEPCID) 20 mg tablet   • fluticasone (FLONASE) 50 mcg/act nasal spray   • gabapentin (NEURONTIN) 600 MG tablet   • lisinopril (ZESTRIL) 5 mg tablet   • loratadine (CLARITIN) 10 mg tablet   • neomycin-polymyxin-hydrocortisone (CORTISPORIN) 0.35%-10,000 units/mL-1% otic suspension   • NON FORMULARY   • oxyCODONE (OXY-IR) 5 MG capsule   • sertraline (ZOLOFT) 50 mg tablet   • diphenhydrAMINE (BENADRYL) 25 mg tablet   • EPINEPHrine (EPIPEN) 0.3 mg/0.3 mL SOAJ   • naloxone (NARCAN) 4 mg/0.1 mL nasal spray     Allergies   Allergen Reactions   • Methyldopa Shortness Of Breath and Other (See Comments)     Aldomet       Vitals: Blood pressure 145/85, pulse 83, temperature 98.4 °F (36.9 °C), resp. rate 17, height 5' 1" (1.549 m), weight 76.6 kg (168 lb 14 oz), SpO2 92 %. ,  Body mass index is 31.91 kg/m².       Intake/Output Summary (Last 24 hours) at 8/29/2023 0830  Last data filed at 8/28/2023 1439  Gross per 24 hour   Intake 290 ml   Output 300 ml   Net -10 ml       Physical exam:    General Appearance:    Alert, cooperative, no conversational dyspnea or accessory     muscle use       Head/eyes:    Normocephalic, without obvious abnormality, atraumatic,         PERRL, extraocular muscles intact, no scleral icterus    Throat:   Moist mucous membranes   Neck:   Supple, trachea midline   Lungs:     LLL rhonchi otherwise CTA   Chest Wall:    No tenderness or deformity    Heart:    Regular rate and rhythm, S1 and S2 normal, no murmur, rub   or gallop   Abdomen:     Soft, non-tender, bowel sounds active all four quadrants,     no masses, no organomegaly   Extremities:   Extremities normal, atraumatic, no cyanosis or edema   Skin:   Warm, dry, turgor normal, no rashes or lesions         Labs: I have personally reviewed pertinent lab results.     Code Status: Level 1 - Full Code      Jefry Munguia DO

## 2023-08-29 NOTE — PROGRESS NOTES
[address] [facility]  Progress Note  Name: Renaldo Ba  MRN: [id number]  Unit/Bed#: W -01 I Date of Admission: 8/25/2023   Date of Service: 8/29/2023 I Hospital Day: 4    Assessment/Plan   * Sepsis [facility])  Assessment & Plan  Concern for Sepsis POA as evidenced by tachycardia 124, leukocytosis 17.9  Suspected source: Pneumonia with mucous plug    CT CAP- mucous plugging in left mainstem bronchus with volume loss in left lower lobe/left upper lobe  Chest x-ray-increased cardiomegaly, left basilar infiltrate  Procal- Neg   Lactate- neg   Legionella strep pneumo antigen negative  Sputum culture-1+ gram-negative rods, no polys  Blood culture-negative  S/P Lasix 40 IV 1 dose yesterday, in setting of possible fluid overload    Plan-  Ceftriaxone day 5/7.   Appreciate pulmonology recommendations      Acute respiratory failure with hypoxia [facility])  Assessment & Plan  Complaining of shortness of breath and productive cough that has been progressively worsening over the past month  Prior to arrival she was severely short of breath at rest which prompted her to come into the ED  On arrival patient was saturating 84% on room air then escalated to 15 L and ultimately a nonrebreather      Plan-  See sepsis secondary to pneumonia  Continue to wean O2 as appropriate  Hypertonic nebs, Atrovent/Xopenex, hold off ICS  High frequency chest wall oscillation, flutter valve therapy  Solumedrol 40 (day 1)  Repeat CT chest 6-8 weeks        Abnormal computed tomography angiography (CTA)  Assessment & Plan  CTA chest PE- equivocal embolus in the posterior basal segment of the left lower lung, mucous plugging in the left mainstem bronchus with volume loss in the left lower lobe and left upper lobe, aspiration pneumonia not excluded, Dilatation of pulmonary artery which is indicative of chronic pulmonary hypertension, emphysema, mediastinal hilar adenopathy concerning for neoplasm, Enlarging T12 lesion    CXRAY 8/27/23: Stable volume loss on the left secondary to left lower lobe atelectasis. Plan-  -Appreciate Pulmonology recommendations: pulmonary hygiene, flutter valve. - Holding off on broncoscopy as of now.  -Hypertonic nebulizers, Atrovent/Xopenex, hold off on ICS  -Xopenex, Atrovent, DuoNebs  -Airway clearance protocol          Pulmonary embolism (HCC)  Assessment & Plan  Initially presenting due to worsening shortness of breath and productive cough  D-dimer 0.99    CTA chest PE study-equivocal embolus posterior basal segment of the left lower lung  CT findings also suspicious for possible neoplasm in the setting of mediastinal/hilar adenopathy  ED started patient on heparin drip  Patient has no history of DVT or PE, family history of clotting disorders, patient notes that she is up-to-date with all screenings  Current tobacco user 1 pack/day    Plan-  D/c heparin gtt due to negative LE doppler  Appreciate pulmonology recommendations  Will eventually  require a malignancy work-up        (HFpEF) heart failure with preserved ejection fraction (HCC)  Assessment & Plan  Wt Readings from Last 3 Encounters:   08/29/23 76.6 kg (168 lb 14 oz)   08/11/23 74.8 kg (165 lb)   07/11/23 71.7 kg (158 lb)   Patient actually complains of recent significant weight loss  Euvolemic on exam  Lung findings on imaging leans more towards a clinical picture of pneumonia rather than CHF exacerbation  Last echo was in 2021 EF 32%, grade 1 diastolic dysfunction    Plan-  Off IVF  Continue to monitor volume status  Repeat echo 8/28/23:  left ventricular ejection fraction is 60%. Systolic function is normal. Wall motion is normal. Diastolic function is mildly abnormal, consistent with grade I (abnormal) relaxation. There is a trivial pericardial effusion along the right atrial free wall.               Stage 3a chronic kidney disease [facility])  Assessment & Plan  Lab Results   Component Value Date    EGFR 45 08/29/2023    EGFR 47 08/28/2023    EGFR 79 2023    CREATININE 1.22 2023    CREATININE 1.17 2023    CREATININE 0.77 2023   Creatinine baseline appears to be around 1.5  Does not appear to be in acute kidney injury at this time    Plan-  Monitor I's/O  Monitor BMP  Avoid nephrotoxins  Avoid hypotension    Elevated troponin  Assessment & Plan  Likely non-MI troponin elevation secondary to pulmonary embolism versus sepsis   Troponin 73, 60, 76  EKG sinus tachycardia 106 no ST-T wave changes  Denies chest pain, palpitations    Plan-  Continue to monitor  telemetry showed no significant events    Benign essential hypertension  Assessment & Plan  BP on admission 149/87  Home regimen includes amlodipine 10, Coreg 25 BID, lisinopril 10qd    Plan-  Continue home regimen           VTE Pharmacologic Prophylaxis: VTE Score: 6 High Risk (Score >/= 5) - Pharmacological DVT Prophylaxis Ordered: heparin. Sequential Compression Devices Ordered. Patient Centered Rounds: I performed bedside rounds with nursing staff today. Discussions with Specialists or Other Care Team Provider: Pulmonology     Education and Discussions with Family / Patient: Updated  (daughter) via phone. Current Length of Stay: 4 day(s)  Current Patient Status: Inpatient   Discharge Plan: Anticipate discharge in 24-48 hrs to home with home PT and roller walker    Code Status: Level 1 - Full Code    Subjective:   Ms. Corinne Fallow reports she is spitting up clear mucus. She still feels congested. Has no other complaints. Objective:     Vitals:   Temp (24hrs), Av.4 °F (36.9 °C), Min:97.8 °F (36.6 °C), Max:99.1 °F (37.3 °C)    Temp:  [97.8 °F (36.6 °C)-99.1 °F (37.3 °C)] 98.4 °F (36.9 °C)  HR:  [74-83] 83  Resp:  [17-20] 17  BP: (134-190)/() 145/85  SpO2:  [89 %-100 %] 100 %  Body mass index is 31.91 kg/m². Input and Output Summary (last 24 hours):      Intake/Output Summary (Last 24 hours) at 2023 1421  Last data filed at 2023 1439  Gross per 24 hour   Intake 50 ml   Output 300 ml   Net -250 ml       Physical Exam:   Physical Exam  Vitals and nursing note reviewed. Constitutional:       Appearance: Normal appearance. HENT:      Head: Normocephalic. Right Ear: External ear normal.      Left Ear: External ear normal.      Nose: Nose normal.      Mouth/Throat:      Mouth: Mucous membranes are moist.      Pharynx: Oropharynx is clear. Eyes:      Extraocular Movements: Extraocular movements intact. Cardiovascular:      Rate and Rhythm: Normal rate and regular rhythm. Pulses: Normal pulses. Heart sounds: Normal heart sounds. Pulmonary:      Effort: Pulmonary effort is normal.      Breath sounds: Normal breath sounds. Abdominal:      General: Abdomen is flat. Bowel sounds are normal.      Palpations: Abdomen is soft. Musculoskeletal:      Cervical back: Normal range of motion. Skin:     General: Skin is warm and dry. Neurological:      Mental Status: She is alert. Mental status is at baseline. Psychiatric:         Mood and Affect: Mood normal.         Behavior: Behavior normal.          Additional Data:     Labs:  Results from last 7 days   Lab Units 08/29/23  0520 08/27/23  0442 08/26/23  0511   WBC Thousand/uL 8.67   < > 14.17*   HEMOGLOBIN g/dL 14.1   < > 14.0   HEMATOCRIT % 44.1   < > 44.1   PLATELETS Thousands/uL 303   < > 276   NEUTROS PCT %  --   --  80*   LYMPHS PCT %  --   --  10*   MONOS PCT %  --   --  9   EOS PCT %  --   --  0    < > = values in this interval not displayed.      Results from last 7 days   Lab Units 08/29/23  0520 08/26/23  0511 08/25/23  1157   SODIUM mmol/L 140   < > 139   POTASSIUM mmol/L 4.0   < > 4.0   CHLORIDE mmol/L 106   < > 107   CO2 mmol/L 27   < > 24   BUN mg/dL 21   < > 15   CREATININE mg/dL 1.22   < > 1.01   ANION GAP mmol/L 7   < > 8   CALCIUM mg/dL 10.0   < > 9.9   ALBUMIN g/dL  --   --  3.6   TOTAL BILIRUBIN mg/dL  --   --  0.31   ALK PHOS U/L  --   --  78   ALT U/L  --   --  6*   AST U/L  --   --  14   GLUCOSE RANDOM mg/dL 135   < > 131    < > = values in this interval not displayed. Results from last 7 days   Lab Units 08/25/23  1339   INR  0.97             Results from last 7 days   Lab Units 08/27/23  1312 08/26/23  0511 08/25/23  2109 08/25/23 2108   LACTIC ACID mmol/L  --   --   --  0.6   PROCALCITONIN ng/ml 0.13 0.15 0.17  --        Lines/Drains:  Invasive Devices     Peripheral Intravenous Line  Duration           Peripheral IV 08/29/23 Dorsal (posterior); Right Hand <1 day                      Imaging: Reviewed radiology reports from this admission including: Chest xray & VAS US    Recent Cultures (last 7 days):   Results from last 7 days   Lab Units 08/25/23  2222 08/25/23 2108 08/25/23 2107   BLOOD CULTURE   --   --  No Growth at 72 hrs. No Growth at 72 hrs.    SPUTUM CULTURE  3+ Growth of Candida albicans*  2+ Growth of  --   --    GRAM STAIN RESULT  No Epithelial cells seen*  No polys seen*  1+ Gram negative rods*  --   --    LEGIONELLA URINARY ANTIGEN   --  Negative  --        Last 24 Hours Medication List:   Current Facility-Administered Medications   Medication Dose Route Frequency Provider Last Rate   • acetaminophen  650 mg Oral Q6H PRN Cammy Trevizo DO     • albuterol  2 puff Inhalation Q4H PRN Cammy Trevizo DO     • amLODIPine  10 mg Oral Daily Cammy Trevizo DO     • busPIRone  30 mg Oral TID Cammy Trevizo DO     • carvedilol  25 mg Oral BID With Meals Cammy Trevizo DO     • cefTRIAXone  1,000 mg Intravenous Q24H Cammy Trevizo DO 1,000 mg (08/29/23 1340)   • famotidine  20 mg Oral Daily Cammy Trevizo DO     • fluticasone  2 spray Each Nare Daily Cammy Trevizo DO     • gabapentin  600 mg Oral TID Cammy Trevizo DO     • heparin (porcine)  5,000 Units Subcutaneous Q8H [address] Cammy Trevizo DO     • ipratropium  0.5 mg Nebulization TID Cammy Trevizo DO     • levalbuterol  1.25 mg Nebulization TID Cammy Trevizo, DO     • lisinopril  10 mg Oral Daily Cammy Trevizo, DO     • loratadine  10 mg Oral Daily Cammy Trevizo, DO     • methylPREDNISolone sodium succinate  40 mg Intravenous Daily Cammy Trevizo, DO     • nicotine  1 patch Transdermal Daily Cammy Trevizo, DO     • ondansetron  4 mg Intravenous Q6H PRN Cammy Trevizo, DO     • oxyCODONE  5 mg Oral BID PRN Cammy Trevizo DO     • polyethylene glycol  17 g Oral Daily PRN Cammy Trevizo, DO     • sertraline  50 mg Oral Daily Cammy Trevizo, DO     • sodium chloride  1 spray Each Nare Q1H PRN Tonia Morton MD     • sodium chloride  4 mL Nebulization TID Ronnie Zafar DO          Today, Patient Was Seen By: Tonia Morton MD    **Please Note: This note may have been constructed using a voice recognition system. **

## 2023-08-29 NOTE — ASSESSMENT & PLAN NOTE
Concern for Sepsis POA as evidenced by tachycardia 124, leukocytosis 17.9  Suspected source: Pneumonia with mucous plug    CT CAP- mucous plugging in left mainstem bronchus with volume loss in left lower lobe/left upper lobe  Chest x-ray-increased cardiomegaly, left basilar infiltrate  Procal- Neg   Lactate- neg   Legionella strep pneumo antigen negative  Sputum culture-1+ gram-negative rods, no polys  Blood culture-negative  S/P Lasix 40 IV 1 dose yesterday, in setting of possible fluid overload    Plan-  Ceftriaxone day 5/7.   Appreciate pulmonology recommendations

## 2023-08-29 NOTE — ASSESSMENT & PLAN NOTE
CTA chest PE- equivocal embolus in the posterior basal segment of the left lower lung, mucous plugging in the left mainstem bronchus with volume loss in the left lower lobe and left upper lobe, aspiration pneumonia not excluded, Dilatation of pulmonary artery which is indicative of chronic pulmonary hypertension, emphysema, mediastinal hilar adenopathy concerning for neoplasm, Enlarging T12 lesion    CXRAY 8/27/23: Stable volume loss on the left secondary to left lower lobe atelectasis. Plan-  -Appreciate Pulmonology recommendations: pulmonary hygiene, flutter valve. - Holding off on broncoscopy as of now.   - Atrovent/Xopenex, hold off on ICS  -Airway clearance protocol

## 2023-08-29 NOTE — ASSESSMENT & PLAN NOTE
Concern for Sepsis POA as evidenced by tachycardia 124, leukocytosis 17.9  Suspected source: Pneumonia with mucous plug    CT CAP- mucous plugging in left mainstem bronchus with volume loss in left lower lobe/left upper lobe  Chest x-ray-increased cardiomegaly, left basilar infiltrate  Procal- Neg   Lactate- neg   Legionella strep pneumo antigen negative  Sputum culture-1+ gram-negative rods, no polys  Blood culture-negative  S/P Lasix 40 IV 1 dose yesterday, in setting of possible fluid overload    Plan-  Ceftriaxone day 6/7.   Once discharged, initiate cefdinir BID x 1 day to complete antibiotic dose

## 2023-08-29 NOTE — ASSESSMENT & PLAN NOTE
Lab Results   Component Value Date    EGFR 48 08/30/2023    EGFR 45 08/29/2023    EGFR 47 08/28/2023    CREATININE 1.15 08/30/2023    CREATININE 1.22 08/29/2023    CREATININE 1.17 08/28/2023   Creatinine baseline appears to be around 1.5  Does not appear to be in acute kidney injury at this time    Plan-  Monitor I's/O  Monitor BMP  Avoid nephrotoxins  Avoid hypotension

## 2023-08-30 VITALS
HEART RATE: 97 BPM | RESPIRATION RATE: 18 BRPM | WEIGHT: 161.6 LBS | HEIGHT: 61 IN | BODY MASS INDEX: 30.51 KG/M2 | SYSTOLIC BLOOD PRESSURE: 150 MMHG | TEMPERATURE: 98.1 F | OXYGEN SATURATION: 93 % | DIASTOLIC BLOOD PRESSURE: 95 MMHG

## 2023-08-30 PROBLEM — N89.8 VAGINAL ITCHING: Status: ACTIVE | Noted: 2023-08-30

## 2023-08-30 PROBLEM — R26.2 AMBULATORY DYSFUNCTION: Status: ACTIVE | Noted: 2023-08-30

## 2023-08-30 LAB
ANION GAP SERPL CALCULATED.3IONS-SCNC: 7 MMOL/L
BACTERIA BLD CULT: NORMAL
BACTERIA BLD CULT: NORMAL
BUN SERPL-MCNC: 26 MG/DL (ref 5–25)
CALCIUM SERPL-MCNC: 10.6 MG/DL (ref 8.4–10.2)
CHLORIDE SERPL-SCNC: 104 MMOL/L (ref 96–108)
CO2 SERPL-SCNC: 29 MMOL/L (ref 21–32)
CREAT SERPL-MCNC: 1.15 MG/DL (ref 0.6–1.3)
ERYTHROCYTE [DISTWIDTH] IN BLOOD BY AUTOMATED COUNT: 14.8 % (ref 11.6–15.1)
GFR SERPL CREATININE-BSD FRML MDRD: 48 ML/MIN/1.73SQ M
GLUCOSE SERPL-MCNC: 100 MG/DL (ref 65–140)
HCT VFR BLD AUTO: 47.4 % (ref 34.8–46.1)
HGB BLD-MCNC: 15.4 G/DL (ref 11.5–15.4)
MCH RBC QN AUTO: 31 PG (ref 26.8–34.3)
MCHC RBC AUTO-ENTMCNC: 32.5 G/DL (ref 31.4–37.4)
MCV RBC AUTO: 96 FL (ref 82–98)
PLATELET # BLD AUTO: 335 THOUSANDS/UL (ref 149–390)
PMV BLD AUTO: 9.2 FL (ref 8.9–12.7)
POTASSIUM SERPL-SCNC: 4.3 MMOL/L (ref 3.5–5.3)
RBC # BLD AUTO: 4.96 MILLION/UL (ref 3.81–5.12)
SODIUM SERPL-SCNC: 140 MMOL/L (ref 135–147)
WBC # BLD AUTO: 12.25 THOUSAND/UL (ref 4.31–10.16)

## 2023-08-30 PROCEDURE — 94760 N-INVAS EAR/PLS OXIMETRY 1: CPT

## 2023-08-30 PROCEDURE — 80048 BASIC METABOLIC PNL TOTAL CA: CPT

## 2023-08-30 PROCEDURE — 97166 OT EVAL MOD COMPLEX 45 MIN: CPT

## 2023-08-30 PROCEDURE — 94669 MECHANICAL CHEST WALL OSCILL: CPT

## 2023-08-30 PROCEDURE — 99232 SBSQ HOSP IP/OBS MODERATE 35: CPT | Performed by: NURSE PRACTITIONER

## 2023-08-30 PROCEDURE — 99239 HOSP IP/OBS DSCHRG MGMT >30: CPT | Performed by: INTERNAL MEDICINE

## 2023-08-30 PROCEDURE — 85027 COMPLETE CBC AUTOMATED: CPT

## 2023-08-30 PROCEDURE — 94640 AIRWAY INHALATION TREATMENT: CPT

## 2023-08-30 RX ORDER — FLUTICASONE FUROATE, UMECLIDINIUM BROMIDE AND VILANTEROL TRIFENATATE 100; 62.5; 25 UG/1; UG/1; UG/1
1 POWDER RESPIRATORY (INHALATION) DAILY
Qty: 60 BLISTER | Refills: 0 | Status: SHIPPED | OUTPATIENT
Start: 2023-08-30 | End: 2023-09-29

## 2023-08-30 RX ORDER — PREDNISONE 20 MG/1
TABLET ORAL
Qty: 13 TABLET | Refills: 0 | Status: SHIPPED | OUTPATIENT
Start: 2023-08-31 | End: 2023-09-11

## 2023-08-30 RX ORDER — LISINOPRIL 5 MG/1
10 TABLET ORAL DAILY
Start: 2023-08-30

## 2023-08-30 RX ORDER — CEFDINIR 300 MG/1
300 CAPSULE ORAL EVERY 12 HOURS SCHEDULED
Qty: 2 CAPSULE | Refills: 0 | Status: SHIPPED | OUTPATIENT
Start: 2023-08-31 | End: 2023-09-01

## 2023-08-30 RX ADMIN — SERTRALINE HYDROCHLORIDE 50 MG: 50 TABLET ORAL at 08:21

## 2023-08-30 RX ADMIN — LEVALBUTEROL HYDROCHLORIDE 1.25 MG: 1.25 SOLUTION RESPIRATORY (INHALATION) at 07:12

## 2023-08-30 RX ADMIN — GABAPENTIN 600 MG: 300 CAPSULE ORAL at 08:20

## 2023-08-30 RX ADMIN — ALBUTEROL SULFATE 2 PUFF: 90 AEROSOL, METERED RESPIRATORY (INHALATION) at 01:45

## 2023-08-30 RX ADMIN — IPRATROPIUM BROMIDE 0.5 MG: 0.5 SOLUTION RESPIRATORY (INHALATION) at 07:12

## 2023-08-30 RX ADMIN — BUSPIRONE HYDROCHLORIDE 30 MG: 10 TABLET ORAL at 08:20

## 2023-08-30 RX ADMIN — CARVEDILOL 25 MG: 12.5 TABLET, FILM COATED ORAL at 08:21

## 2023-08-30 RX ADMIN — HEPARIN SODIUM 5000 UNITS: 5000 INJECTION INTRAVENOUS; SUBCUTANEOUS at 05:41

## 2023-08-30 RX ADMIN — AMLODIPINE BESYLATE 10 MG: 5 TABLET ORAL at 08:21

## 2023-08-30 RX ADMIN — PREDNISONE 40 MG: 20 TABLET ORAL at 08:21

## 2023-08-30 RX ADMIN — LORATADINE 10 MG: 10 TABLET ORAL at 08:21

## 2023-08-30 RX ADMIN — FAMOTIDINE 20 MG: 20 TABLET ORAL at 08:21

## 2023-08-30 RX ADMIN — FLUTICASONE PROPIONATE 2 SPRAY: 50 SPRAY, METERED NASAL at 09:04

## 2023-08-30 RX ADMIN — OXYCODONE HYDROCHLORIDE 5 MG: 5 TABLET ORAL at 08:20

## 2023-08-30 RX ADMIN — LISINOPRIL 10 MG: 10 TABLET ORAL at 08:20

## 2023-08-30 RX ADMIN — NICOTINE 1 PATCH: 14 PATCH, EXTENDED RELEASE TRANSDERMAL at 08:21

## 2023-08-30 NOTE — DISCHARGE INSTR - AVS FIRST PAGE
Dear Rocky Downing,     It was our pleasure to care for you here at [facility], [facility]. It is our hope that we were always able to exceed the expected standards for your care during your stay. You were hospitalized due to Pneumonia and increased mucus in your lungs found on a CT scan. You were cared for on the W [address]4 floor by Altaf Lacy MD under the service of Madison John MD with [facility] who covers for your primary care physician (PCP), Daniel Pyle MD, while you were hospitalized. If you have any questions or concerns related to this hospitalization, you may contact us at [phone]. For follow up as well as any medication refills, we recommend that you follow up with your primary care physician. A registered nurse will reach out to you by phone within a few days after your discharge to answer any additional questions that you may have after going home. However, at this time we provide for you here, the most important instructions / recommendations at discharge:     Notable Medication Adjustments -   Begin taking Cefdinir for 1 day after discharge, 2 times a day. You may stop taking it after 1 day. Begin taking Prednisone, and taper your dose according to the instructions:  Take 2 tablets (40 mg total) by mouth daily for 2 days, THEN 1.5 tablets (30 mg total) daily for 3 days, THEN 1 tablet (20 mg total) daily for 3 days, THEN 0.5 tablets (10 mg total) daily for 3 days  Begin taking Trelegy Ellipta, inhale 1 puff daily  Begin using Monistat vaginal cream  Testing Required after Discharge -   Please follow up with pulmonology and complete an outpatient CT scan of your chest within 6-8 weeks  ** Please contact your PCP to request testing orders for any of the testing recommended here **  Important follow up information -   Please follow up with outpatient pulmonology  Please follow up with your PCP within 1 week of discharge  Other Instructions -   None  Please review this entire after visit summary as additional general instructions including medication list, appointments, activity, diet, any pertinent wound care, and other additional recommendations from your care team that may be provided for you.       Sincerely,     Fatoumata Avery MD

## 2023-08-30 NOTE — PLAN OF CARE
Problem: OCCUPATIONAL THERAPY ADULT  Goal: Performs self-care activities at highest level of function for planned discharge setting. See evaluation for individualized goals. Description: Treatment Interventions: ADL retraining, Functional transfer training, UE strengthening/ROM, Endurance training, Patient/family training, Equipment evaluation/education, Compensatory technique education, Continued evaluation, Energy conservation  Equipment Recommended:  (shower chair/BSC, Bidet)       See flowsheet documentation for full assessment, interventions and recommendations. 8/30/2023 1406 by Jeff Awad OT  Outcome: Progressing  Note: Limitation: Decreased high-level ADLs, Decreased self-care trans, Decreased endurance, Decreased Safe judgement during ADL  Prognosis: Fair  Assessment: Pt is a 69 y.o. female seen for OT evaluation s/p admission to [address] [place] [address] on 8/25/2023  with diagnoses Sepsis ([address]). Pt has a significant PMH impacting occupational performance, which is listed above. PTA pt living with daughter and reports self to be (I) PTA with the exception of bathing LB and backside. Pt also reports fatigue when performing standing activities. Pt is motivated to return to home and was noted to have d/c orders in chart prior to OT arrival. Despite the latter, pt with concerns for this OT- asking for advice to make standing ADLs easier- more specifically, sinkside G&H. Personal and environmental factors supporting pt at time of IE include age, (I) PLOF and supportive daughter whom assists with bathing at baseline. Personal and environmental factors inhibiting engagement in occupations include difficulty completing ADLs and difficulty completing IADLs. During evaluation pt performed as is outlined above in flowsheet. Pt was educated on use of a non-skid chair to place sinkside for G&H tasks to conserve energy during sinkside ADLs.  Also, pt was given information re: obtaining a bidet to make the task of toileting hygiene easier as well as to conserve energy with this. Pt and daughter, whom was present at the end of session for education, appreciative and verbalized understanding to education. Standardized assessments used to assist in identifying performance deficits include [facility] 6-Clicks. Performance deficits that affect the pt’s occupational performance during the initial evaluation include impaired pain, balance, fxnl mobility, act mike and fxnl reach. Based on pt’s functional performance and deficits the following occupations will be addressed in OT treatments in order to maximize pt’s independence and overall occupational performance: grooming, bathing/showering, toileting and toilet hygiene, dressing, functional mobility and community mobility. Goals are listed below. Upon discharge from acute care setting recommend d/c to Home with family support + HHOT. This evaluation required an extensive review of medical and/or therapy records and additional review of physical, cognitive and psychosocial history related to functional performance. Based upon functional performance deficits and assessments, this evaluation has been identified as a moderate complexity evaluation. OT Discharge Recommendation: Home with home health rehabilitation       8/30/2023 1406 by Shaina Barrientos OT  Note: Limitation: Decreased high-level ADLs, Decreased self-care trans, Decreased endurance, Decreased Safe judgement during ADL  Prognosis: Fair  Assessment: Pt is a 69 y.o. female seen for OT evaluation s/p admission to [address] [place] [address] on 8/25/2023  with diagnoses Sepsis ([address]). Pt has a significant PMH impacting occupational performance, which is listed above. PTA pt living with daughter and reports self to be (I) PTA with the exception of bathing LB and backside. Pt also reports fatigue when performing standing activities.  Pt is motivated to return to home and was noted to have d/c orders in chart prior to OT arrival. Despite the latter, pt with concerns for this OT- asking for advice to make standing ADLs easier- more specifically, sinkside G&H. Personal and environmental factors supporting pt at time of IE include age, (I) PLOF and supportive daughter whom assists with bathing at baseline. Personal and environmental factors inhibiting engagement in occupations include difficulty completing ADLs and difficulty completing IADLs. During evaluation pt performed as is outlined above in flowsheet. Pt was educated on use of a non-skid chair to place sinkside for G&H tasks to conserve energy during sinkside ADLs. Also, pt was given information re: obtaining a bidet to make the task of toileting hygiene easier as well as to conserve energy with this. Pt and daughter, whom was present at the end of session for education, appreciative and verbalized understanding to education. Standardized assessments used to assist in identifying performance deficits include [facility] 6-Clicks. Performance deficits that affect the pt’s occupational performance during the initial evaluation include impaired pain, balance, fxnl mobility, act mike and fxnl reach. Based on pt’s functional performance and deficits the following occupations will be addressed in OT treatments in order to maximize pt’s independence and overall occupational performance: grooming, bathing/showering, toileting and toilet hygiene, dressing, functional mobility and community mobility. Goals are listed below. Upon discharge from acute care setting recommend d/c to Home with family support + HHOT. This evaluation required an extensive review of medical and/or therapy records and additional review of physical, cognitive and psychosocial history related to functional performance. Based upon functional performance deficits and assessments, this evaluation has been identified as a moderate complexity evaluation.      OT Discharge Recommendation: Home with home health rehabilitation

## 2023-08-30 NOTE — ASSESSMENT & PLAN NOTE
Patient developed vulvar itching on 8/29/23. No urinary symptoms including burning or frequency.     Plan:  Discharge patient on miconazole cream

## 2023-08-30 NOTE — CASE MANAGEMENT
Case Management Discharge Planning Note    Patient name Pal Watson  Location W MS 1/W -01 MRN [id number]  : 1955 Date 2023       Current Admission Date: 2023  Current Admission Diagnosis:Sepsis [facility])   Patient Active Problem List    Diagnosis Date Noted   • Vaginal itching 2023   • Ambulatory dysfunction 2023   • Acute respiratory failure with hypoxia ([address]) 2023   • Sepsis ([address]) 2023   • (HFpEF) heart failure with preserved ejection fraction ([address]) 2023   • Pulmonary embolism ([address]) 2023   • Acute pain of right knee 2021   • Stage 3a chronic kidney disease ([address]) 2021   • Bone lesion 2021   • Nicotine dependence 06/15/2021   • Bipolar disorder, unspecified ([address]) 06/10/2021   • Morbid (severe) obesity due to excess calories ([address]) 06/10/2021   • Abnormal computed tomography angiography (CTA) 2020   • Screening for colon cancer 07/15/2020   • Angio-edema, initial encounter 07/10/2020   • Elevated troponin 07/10/2020   • Leg cramps, sleep related 2016   • Pain syndrome, chronic 2014   • Pain of lower leg 2013   • 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   • Disc degeneration, lumbar 2013   • Lumbar radiculopathy 2013   • Myofascial pain syndrome 2013   • Osteoarthritis of spine with radiculopathy, lumbar region 2013   • Backache 04/10/2013   • Benign essential hypertension 2012   • Bipolar I disorder, single manic episode ([address]) 2012   • Hyperlipidemia 2012   • Lower back pain 2012      LOS (days): 5  Geometric Mean LOS (GMLOS) (days): 5.00  Days to GMLOS:0.2     OBJECTIVE:  Risk of Unplanned Readmission Score: 14.74         Current admission status: Inpatient   Preferred Pharmacy:   [facility]/pharmacy #[id number]- LIZZY GARAY - [address] [place] [address],[address] Floor. [address] [facility],[address]   [place] [address]  Phone: [phone] Fax: [phone] [facility] [place]) [place], [place] - [address] [place] [address]  Phone: [phone] Fax: [phone]    Primary Care Provider: Rubina Grace MD    Primary Insurance: [place]  Secondary Insurance: [address]    DISCHARGE DETAILS:    DME Referral Provided  Referral made for DME?: Yes  DME referral completed for the following items[de-identified] Amandeep Gao  DME Supplier Name[de-identified] [employer]    Other Referral/Resources/Interventions Provided:  Referral Comments: Amandeep Gao provided to patient. Confirmed with daughter over phone.

## 2023-08-30 NOTE — CASE MANAGEMENT
Case Management Discharge Planning Note    Patient name Sundeep DE SOUZA MS 1/W -01 MRN [id number]  : 1955 Date 2023       Current Admission Date: 2023  Current Admission Diagnosis:Sepsis [facility])   Patient Active Problem List    Diagnosis Date Noted   • Vaginal itching 2023   • Ambulatory dysfunction 2023   • Acute respiratory failure with hypoxia ([address]) 2023   • Sepsis ([address]) 2023   • (HFpEF) heart failure with preserved ejection fraction ([address]) 2023   • Pulmonary embolism ([address]) 2023   • Acute pain of right knee 2021   • Stage 3a chronic kidney disease ([address]) 2021   • Bone lesion 2021   • Nicotine dependence 06/15/2021   • Bipolar disorder, unspecified ([address]) 06/10/2021   • Morbid (severe) obesity due to excess calories ([address]) 06/10/2021   • Abnormal computed tomography angiography (CTA) 2020   • Screening for colon cancer 07/15/2020   • Angio-edema, initial encounter 07/10/2020   • Elevated troponin 07/10/2020   • Leg cramps, sleep related 2016   • Pain syndrome, chronic 2014   • Pain of lower leg 2013   • 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   • Disc degeneration, lumbar 2013   • Lumbar radiculopathy 2013   • Myofascial pain syndrome 2013   • Osteoarthritis of spine with radiculopathy, lumbar region 2013   • Backache 04/10/2013   • Benign essential hypertension 2012   • Bipolar I disorder, single manic episode ([address]) 2012   • Hyperlipidemia 2012   • Lower back pain 2012      LOS (days): 5  Geometric Mean LOS (GMLOS) (days): 5.00  Days to GMLOS:0.2     OBJECTIVE:  Risk of Unplanned Readmission Score: 14.72         Current admission status: Inpatient   Preferred Pharmacy:   [facility]/pharmacy #[id number] LI[place] GARAY - [address]. [address] [facility],4Th Floor   [place] [address]  Phone: [phone] Fax: [phone] [facility] Adelaida Fuentes [facility], [place] - [address] [facility] [address]  [facility] [address]  Phone: [phone] Fax: [phone]    Primary Care Provider: Lauren Fonseca MD    Primary Insurance: Danisha Light [address] [facility]  Secondary Insurance: [address] [facility]    DISCHARGE DETAILS:    Discharge planning discussed with[de-identified] patient, daughter and  Jia Mendoza  [place] of Choice: Yes     CM contacted family/caregiver?: Yes  Were Treatment Team discharge recommendations reviewed with patient/caregiver?: Yes  Did patient/caregiver verbalize understanding of patient care needs?: Yes  Were patient/caregiver advised of the risks associated with not following Treatment Team discharge recommendations?: Yes    Contacts  Patient Contacts: Linnette-daughter  Relationship to Patient[de-identified] Family  Contact Method: Phone  Reason/Outcome: Continuity of Care, Emergency Contact, Discharge [facility]         Is the patient interested in [facility] at discharge?: Yes  [facility] requested[de-identified] Occupational Therapy, Physical [facility] Name[de-identified] [facility] External Referral Reason (only applicable if external HHA name selected): Services not provided in network or near patient location  [facility] Provider[de-identified] PCP  Home Health Services Needed[de-identified] Gait/ADL Training, Evaluate Functional Status and Safety, Strengthening/Theraputic Exercises to Improve Function  Homebound Criteria Met[de-identified] Requires the Assistance of Another Person for Safe Ambulation or to Leave the Home, Uses an Assist Device (i.e. cane, walker, etc)  Supporting Clincal Findings[de-identified] Limited Endurance    DME Referral Provided  Referral made for DME?: No    Other Referral/Resources/Interventions Provided:  Interventions: C  Referral Comments: [place] referrals sent for Home PT/OT, [facility] is able to accept    Treatment Team Recommendation: Home with [address]  Discharge Destination Plan[de-identified] Home with Home Health Care  Transport at Discharge : Family     CM spoke with patient on the phone, [phone]. CM introduced self and role. CM reviewed with patient at the bedside per PT, the recommendation at discharge is Home PT services. Patient interested in Home PT/OT services, not SN. Patient updated referrals sent and waiting for available options. Patient requesting CM update daughter with plan of care. CM spoke with patient's daughter Salima Mosher [phone]. CM introduced self and role. Patient's daughter aware that CM spoke with patient. Patient requesting Home PT/OT services at discharge. CM working on [facility]NA availability. Daughter aware that patient is medically stable and will transport home today around 1300. All questions/concerns answered at this time. CM left voice message for  Lamont Ferreira at . CM updated patient on the phone that [facility] is able to accept for Home PT/OT. All questions answered at this time.

## 2023-08-30 NOTE — PROGRESS NOTES
Progress Note - Pulmonary   Pal Watson 69 y.o. female MRN: [id number]  Unit/Bed#: W -01 Encounter: [id number]    Assessment/Plan:    1. Acute hypoxic respiratory failure likely multifaceted as listed below        -Patient transition to room air 94%, not wear home O2        -Attain saturations greater than 88%        -Pulmonary toileting: Deep breathing cough, OOB as tolerated, IS Q 1 hr, Vestm nebulizer        -We will need ambulatory pulse ox prior to discharge    2. Suspected LLL CAP        -Overall microbiology unremarkable        -Day # 5/7-ceftriaxone        -Recommend repeat imaging 4 to 6 weeks    3. Abnormal chest CT        -Mediastinal and hilar adenopathy with LLL mucous plugging        -Likely reactive in the setting of acute infection        -Continue pulmonary toileting as above        -We will need repeat imaging in 4 to 6 weeks    4. COPD of unknown severity with mild acute exacerbation        -Inpatient: day # 1/3-prednisone 40 mg decrease by 10 mg every 3 days, Xopenex/Atrovent 3 times daily        -Please discharge patient on: Trelegy 100/62.5/25 mcg 1 puff daily, albuterol nebulizer every 6 as needed, and ProAir 2 puffs every 6 as needed        -We will need formal PFT testing and pulmonary follow-up    5. LLL possibly unprovoked PE w/ out RV strain       -There is a concerning lesion on T12 we will need to rule out any neoplasm       -Currently maintained on heparin gtt. -Will need to update all cancer markers and screenings to determine length of anticoagulation    6. Active tobacco abuse        -Encourage and educated on tobacco cessation        -NRT per primary team        -Appears in contemplation stage    7.   Chronic grade 1 diastolic CHF w/ mild PHTN likele WHO group II& III        -8/28/2023-Echo-EF 60%   PA pressure 26 mmHg        -Continue to monitor fluid volume balance          Chief Complaint:    " I am feeling a little bit better"    Subjective:    Berenice Rodriguez was comfortably sitting in her bed. She reports overall she feels better since admission. No significant overnight events reported. Patient currently denying any fevers, chills, abscess, headaches, night sweats, pleuritic chest pain, or palpations. Objective:    Vitals: Blood pressure 166/86, pulse 97, temperature 98 °F (36.7 °C), resp. rate 14, height 5' 1" (1.549 m), weight 73.3 kg (161 lb 9.6 oz), SpO2 94 %. ra,Body mass index is 30.53 kg/m². Intake/Output Summary (Last 24 hours) at 8/30/2023 0726  Last data filed at 8/29/2023 1738  Gross per 24 hour   Intake 383.33 ml   Output --   Net 383.33 ml       Invasive Devices     Peripheral Intravenous Line  Duration           Peripheral IV 08/29/23 Dorsal (posterior); Right Hand 1 day                Physical Exam:   Physical Exam  Constitutional:       General: She is not in acute distress. Appearance: Normal appearance. She is normal weight. She is not ill-appearing. HENT:      Head: Normocephalic and atraumatic. Nose: Nose normal. No congestion or rhinorrhea. Mouth/Throat:      Mouth: Mucous membranes are dry. Pharynx: Oropharynx is clear. No oropharyngeal exudate or posterior oropharyngeal erythema. Cardiovascular:      Rate and Rhythm: Normal rate and regular rhythm. Pulses: Normal pulses. Heart sounds: Normal heart sounds. No murmur heard. No friction rub. No gallop. Pulmonary:      Effort: Pulmonary effort is normal. No tachypnea, bradypnea, accessory muscle usage or respiratory distress. Breath sounds: Decreased air movement present. No stridor. Decreased breath sounds, wheezing and rhonchi present. No rales. Comments: Some scattered wheezing and rhonchi  Chest:      Chest wall: No tenderness. Abdominal:      General: Abdomen is flat. Bowel sounds are normal. There is no distension. Palpations: Abdomen is soft. There is no mass. Musculoskeletal:         General: No swelling or tenderness.  Normal range of motion. Cervical back: Normal range of motion. No rigidity or tenderness. Skin:     General: Skin is warm and dry. Coloration: Skin is not jaundiced or pale. Neurological:      General: No focal deficit present. Mental Status: She is alert and oriented to person, place, and time. Mental status is at baseline. Psychiatric:         Mood and Affect: Mood normal.         Behavior: Behavior normal.         Labs: I have personally reviewed pertinent lab results. , ABG: No results found for: "PHART", "BDD1MKB", "PO2ART", "KXW2NAH", "L0GRVREQ", "BEART", "SOURCE", BNP: No results found for: "BNP", CMP:   Lab Results   Component Value Date    SODIUM 140 08/30/2023    K 4.3 08/30/2023     08/30/2023    CO2 29 08/30/2023    BUN 26 (H) 08/30/2023    CREATININE 1.15 08/30/2023    CALCIUM 10.6 (H) 08/30/2023    EGFR 48 08/30/2023   , PT/INR: No results found for: "PT", "INR"    Imaging and other studies: I have personally reviewed pertinent films in PACS     Chest x-ray-left lower lobe pneumonia

## 2023-08-30 NOTE — OCCUPATIONAL THERAPY NOTE
Occupational Therapy Evaluation      Burke Gonzalez    2023    Principal Problem:    Sepsis ([address])  Active Problems:    Benign essential hypertension    Elevated troponin    Abnormal computed tomography angiography (CTA)    Stage 3a chronic kidney disease (HCC)    Acute respiratory failure with hypoxia (HCC)    (HFpEF) heart failure with preserved ejection fraction (HCC)    Pulmonary embolism (HCC)    Vaginal itching    Ambulatory dysfunction      Past Medical History:   Diagnosis Date    Anxiety     Depression     Fatty liver     Hyperlipidemia     Hypertension     Psychiatric disorder     Varicella        Past Surgical History:   Procedure Laterality Date    BREAST SURGERY Bilateral     Reduction Procedure    CARDIAC SURGERY       SECTION      x4    DILATION AND CURETTAGE OF UTERUS      ECTOPIC PREGNANCY SURGERY          23 1245   OT Last Visit   OT Visit Date 23   Note Type   Note type Evaluation   Pain Assessment   Pain Assessment Tool 0-10   Pain Score 6   Pain Location/Orientation Location: Back   Restrictions/Precautions   Other Precautions Fall Risk;Bed Alarm; Chair Alarm  (bed not alarmed upon OT arrival)   Home Living   Type of [address] [place] [address] Two level; Able to live on main level with bedroom/bathroom; Performs ADLs on one level;Stairs to enter with rails   Bathroom Shower/Tub Tub/shower unit   Bathroom Toilet Standard   Bathroom Equipment Shower chair   Bathroom Accessibility Accessible   Home Equipment Quad cane   Prior Function   Level of Daniels Needs assistance with ADLs; Independent with functional mobility   Lives With Daughter  (daughter works during the day)   Receives Help From Family   IADLs Family/Friend/Other provides transportation; Family/Friend/Other provides meals; Family/Friend/Other provides medication management; Independent with driving  (pt drives short distances)   Falls in the last 6 months 1 to 4  (2 per pt)   Comments Patient reports ambulating with quad cane PTA   Lifestyle   Autonomy Pt reports (I) with ADLs with the exception of bathing back or LE's. Pt reports having difficulty STANDING sinkside to complete ADLs   Reciprocal Relationships supportive daughter   General   Family/Caregiver Present Yes  (daughter present at the end of session to take patient home.)   Subjective   Subjective "Do you have any suggestions for standing at the sink to do my grooming?"   ADL   Where Assessed Edge of bed   Eating Assistance 7  Independent   Grooming Assistance 5  Supervision/Setup   [address] 5  Supervision/Setup   LB Bathing Assistance 4  Minimal Assistance   [address] [facility] 5  Supervision/Setup   LB Dressing Assistance 5  Supervision/Setup   LB Dressing Deficit Don/doff R shoe;Don/doff L shoe   Toileting Assistance  5  Supervision/Setup   Bed Mobility   Supine to Sit 6  Modified independent   Transfers   Sit to Stand 5  Supervision   Additional items Verbal cues; Increased time required   Stand to Sit 5  Supervision   Additional items Verbal cues; Increased time required   Functional Mobility   Functional Mobility 5  Supervision   Additional items Rolling walker   Balance   Static Sitting Good   Dynamic Sitting Good   Static Standing Fair  (fair with RW)   Dynamic Standing   (NT)   Activity Tolerance   Activity Tolerance Other (Comment)  (Pt preparing for d/c but expressing OT concerns and therefore seen for brief OT evaluation as well as to received eduction re: modified techniques for ADLs.)   Nurse Made Aware RN cleared pt for OT evaluation. RUE Assessment   RUE Assessment WFL   LUE Assessment   LUE Assessment WFL   Hand Function   Gross Motor Coordination Functional   Fine Motor Coordination Functional   Sensation   Light Touch No apparent deficits   Cognition   Overall Cognitive Status WFL   Arousal/Participation Alert; Cooperative   Attention Within functional limits   Orientation Level Oriented X4   Memory Within functional limits Following Commands Follows multistep commands with increased time or repetition   Comments Pt verified ID by stating name and . Pt expressed feeling irritated re: all of the staff coming in and out of room. Assessment   Limitation Decreased high-level ADLs; Decreased self-care trans;Decreased endurance;Decreased Safe judgement during ADL   Prognosis Fair   Assessment Pt is a 69 y.o. female seen for OT evaluation s/p admission to [address] [place] [address] on 2023  with diagnoses Sepsis ([address] Pt has a significant PMH impacting occupational performance, which is listed above. PTA pt living with daughter and reports self to be (I) PTA with the exception of bathing LB and backside. Pt also reports fatigue when performing standing activities. Pt is motivated to return to home and was noted to have d/c orders in chart prior to OT arrival. Despite the latter, pt with concerns for this OT- asking for advice to make standing ADLs easier- more specifically, sinkside G&H. Personal and environmental factors supporting pt at time of IE include age, (I) PLOF and supportive daughter whom assists with bathing at baseline. Personal and environmental factors inhibiting engagement in occupations include difficulty completing ADLs and difficulty completing IADLs. During evaluation pt performed as is outlined above in flowsheet. Pt was educated on use of a non-skid chair to place sinkside for G&H tasks to conserve energy during sinkside ADLs. Also, pt was given information re: obtaining a bidet to make the task of toileting hygiene easier as well as to conserve energy with this. Pt and daughter, whom was present at the end of session for education, appreciative and verbalized understanding to education. Standardized assessments used to assist in identifying performance deficits include [facility] 6-Clicks.  Performance deficits that affect the pt’s occupational performance during the initial evaluation include impaired pain, balance, fxnl mobility, act mike and fxnl reach. Based on pt’s functional performance and deficits the following occupations will be addressed in OT treatments in order to maximize pt’s independence and overall occupational performance: grooming, bathing/showering, toileting and toilet hygiene, dressing, functional mobility and community mobility. Goals are listed below. Upon discharge from acute care setting recommend d/c to Home with family support + HHOT. This evaluation required an extensive review of medical and/or therapy records and additional review of physical, cognitive and psychosocial history related to functional performance. Based upon functional performance deficits and assessments, this evaluation has been identified as a moderate complexity evaluation. Goals   Patient Goals to go home   LTG Time Frame 7-10   Long Term Goal #1 see goals listed below   Plan   Treatment Interventions ADL retraining;Functional transfer training;UE strengthening/ROM; Endurance training;Patient/family training;Equipment evaluation/education; Compensatory technique education;Continued evaluation; Energy conservation   Goal Expiration Date 09/09/23   OT Frequency 2-3x/wk   Recommendation   OT Discharge Recommendation Home with home health rehabilitation   Equipment Recommended   (shower chair/BSC, Bidet)   AM-PAC Daily Activity Inpatient   Lower Body Dressing 4   Bathing 3   Toileting 4   Upper Body Dressing 4   Grooming 4   Eating 4   Daily Activity Raw Score 23   Daily Activity Standardized Score (Calc for Raw Score >=11) 51.12   AM-PAC Applied Cognition Inpatient   Following a Speech/Presentation 4   Understanding Ordinary Conversation 4   Taking Medications 4   Remembering Where Things Are Placed or Put Away 4   Remembering List of 4-5 Errands 4   Taking Care of Complicated Tasks 4   Applied Cognition Raw Score 24   Applied Cognition Standardized Score 62.21   End of Consult   Education Provided Yes;Family or social support of family present for education by provider   Patient Position at End of Consult Seated edge of bed; All needs within reach   Nurse Communication Nurse aware of consult     GOALS:    Pt will achieve the following within specified time frame:  *Perform ADL transfers at mod (I) level for inc'd independence with ADLs/purposeful tasks    *Perform LB ADL at mod (I) level using AE prn for inc'd independence with self cares    *Increase static stand balance to Good- and dyn stand balance to Fair+ for inc'd safety with standing purposeful tasks    *Increase stand tolerance x5-7m for inc'd tolerance with standing purposeful tasks    *Participate in 10m UE therex to increase overall stamina/activity tolerance for purposeful tasks    *Perform sinkside G&H with seated rest breaks prn at a mod (I) level, with good safety and Fair+ balance for inc'd independence with self cares    *Pt will verbalize 2-3 energy conservation techniques to utilize in order to complete purposeful/ADL tasks safey, at highest level of performance and independence and with self report of dec'd fatigue.     *Pt will verbalize 2-3 fall prevention techniques to utilize in order to complete purposeful/ADL/IADL tasks safely in order to decrease risk of falls    Steff Soto, OT

## 2023-08-30 NOTE — UTILIZATION REVIEW
NOTIFICATION OF ADMISSION DISCHARGE   This is a Notification of Discharge from [facility]. Please be advised that this patient has been discharge from our facility. Below you will find the admission and discharge date and time including the patient’s disposition. UTILIZATION REVIEW CONTACT:  Iwona Hughes  Utilization   Network Utilization Review Department  Phone: [phone] x carefully listen to the prompts. All voicemails are confidential.  Email: [email or web address]     ADMISSION INFORMATION  PRESENTATION DATE: 8/25/2023 11:34 AM  OBERVATION ADMISSION DATE:   INPATIENT ADMISSION DATE: 8/25/23  5:54 PM   DISCHARGE DATE: 8/30/2023  1:19 PM   DISPOSITION:Home with Home Health Care    IMPORTANT INFORMATION:  Send all requests for admission clinical reviews, approved or denied determinations and any other requests to dedicated fax number below belonging to the campus where the patient is receiving treatment.  List of dedicated fax numbers:  [place] [facility] (Administrative/Medical Necessity) [phone] [facility] [phone]   [facility] [phone]   [facility] [phone] [address] [facility] [phone]   [address] [facility] [phone]   [facility] [phone]   [address] [facility] [address] [facility] [phone]   [address] [facility] [phone] 3[address] [facility] [phone] 27[address] [facility] [address] [facility] [phone]

## 2023-08-30 NOTE — PLAN OF CARE
Problem: Potential for Falls  Goal: Patient will remain free of falls  Description: INTERVENTIONS:  - Educate patient/family on patient safety including physical limitations  - Instruct patient to call for assistance with activity   - Consult OT/PT to assist with strengthening/mobility   - Keep Call bell within reach  - Keep bed low and locked with side rails adjusted as appropriate  - Keep care items and personal belongings within reach  - Initiate and maintain comfort rounds  - Make Fall Risk Sign visible to staff  - Offer Toileting every Hours, in advance of need  - Initiate/Maintain alarm  - Obtain necessary fall risk management equipment:   - Apply yellow socks and bracelet for high fall risk patients  - Consider moving patient to room near nurses station  Outcome: Progressing     Problem: MOBILITY - ADULT  Goal: Maintain or return to baseline ADL function  Description: INTERVENTIONS:  -  Assess patient's ability to carry out ADLs; assess patient's baseline for ADL function and identify physical deficits which impact ability to perform ADLs (bathing, care of mouth/teeth, toileting, grooming, dressing, etc.)  - Assess/evaluate cause of self-care deficits   - Assess range of motion  - Assess patient's mobility; develop plan if impaired  - Assess patient's need for assistive devices and provide as appropriate  - Encourage maximum independence but intervene and supervise when necessary  - Involve family in performance of ADLs  - Assess for home care needs following discharge   - Consider OT consult to assist with ADL evaluation and planning for discharge  - Provide patient education as appropriate  Outcome: Progressing  Goal: Maintains/Returns to pre admission functional level  Description: INTERVENTIONS:  - Perform BMAT or MOVE assessment daily.   - Set and communicate daily mobility goal to care team and patient/family/caregiver.    - Collaborate with rehabilitation services on mobility goals if consulted  - Perform Range of Motion  times a day. - Reposition patient every  hours.   - Dangle patient  times a day  - Stand patient  times a day  - Ambulate patient  times a day  - Out of bed to chair  times a day   - Out of bed for meals  times a day  - Out of bed for toileting  - Record patient progress and toleration of activity level   Outcome: Progressing

## 2023-09-01 ENCOUNTER — TRANSITIONAL CARE MANAGEMENT (OUTPATIENT)
Dept: FAMILY MEDICINE CLINIC | Facility: OTHER | Age: 68
End: 2023-09-01

## 2023-09-01 DIAGNOSIS — R09.81 CHRONIC NASAL CONGESTION: ICD-10-CM

## 2023-09-01 DIAGNOSIS — J32.9 SINUSITIS, UNSPECIFIED CHRONICITY, UNSPECIFIED LOCATION: ICD-10-CM

## 2023-09-04 PROBLEM — R05.8 UPPER AIRWAY COUGH SYNDROME: Status: ACTIVE | Noted: 2023-09-04

## 2023-09-04 PROBLEM — J02.9 SORE THROAT: Status: ACTIVE | Noted: 2023-09-04

## 2023-09-04 NOTE — ED ATTENDING ATTESTATION
8/25/2023  I, Cynthia Rick MD, saw and evaluated the patient. I have discussed the patient with the resident/non-physician practitioner and agree with the resident's/non-physician practitioner's findings, Plan of Care, and MDM as documented in the resident's/non-physician practitioner's note, except where noted. All available labs and Radiology studies were reviewed. I was present for key portions of any procedure(s) performed by the resident/non-physician practitioner and I was immediately available to provide assistance. At this point I agree with the current assessment done in the Emergency Department. I have conducted an independent evaluation of this patient a history and physical is as follows:    ED Course  ED Course as of 09/04/23 1637   Fri Aug 25, 2023   150 58-year-old female presenting to the emergency department with shortness of breath. Past medical history significant for hypertension. Patient reports that she has had increasing shortness of breath for several months which has only been getting worse, and continually being attributed to COVID. Denies fever, palpitations. Patient presents to the emergency department today due to additional nausea, vomiting. Vital signs on arrival notable for SPO2 80s percent on room air, tachypnea. Provider was called to bedside. Patient's physical exam is remarkable for a tachypneic, short of breath older woman, pleasant, diminished breath sounds right lung. The patient's differential diagnosis includes pneumonia, influenza, RSV, COVID, pneumothorax, ACS, PE/DVT. Lab work and imaging was ordered. 1220 WBC(!): 17.92   1237 hs TnI 0hr(!): 73   1237 SpO2(!): 82 %  Patient initially pulled off her nonrebreather. Patient was redirected to keep the facemask on.   1237 SpO2: 97 %  Nonrebreather replaced. 1237 XR chest 1 view portable  Increased cardiomegaly.  Development of CHF.     Probable left basal infiltrate   1314 D-Dimer, Quant(!): 0.99  SOB, tachypnea, tachycardia, hypoxia. Rule out PE. Pending CT PE study. 1314 hs TnI 0hr(!): 73  Coags ordered for heparin administration   1729 Reached to pulmonology in regards to mucous plugging. 1737 Case discussed with pulmonary for admission. Heparin started.          Critical Care Time  Procedures

## 2023-09-05 ENCOUNTER — TRANSITIONAL CARE MANAGEMENT (OUTPATIENT)
Dept: FAMILY MEDICINE CLINIC | Facility: OTHER | Age: 68
End: 2023-09-05

## 2023-09-05 ENCOUNTER — TELEPHONE (OUTPATIENT)
Dept: FAMILY MEDICINE CLINIC | Facility: OTHER | Age: 68
End: 2023-09-05

## 2023-09-05 RX ORDER — LORATADINE 10 MG/1
10 TABLET ORAL DAILY
Qty: 90 TABLET | Refills: 2 | Status: ON HOLD | OUTPATIENT
Start: 2023-09-05

## 2023-09-05 NOTE — ASSESSMENT & PLAN NOTE
- Patient reports ongoing congestion, PND, sore throat, SOB, dry cough, left ear tinnitus for the last month  - Finished course of Paxlovid and Amoxacillin for COVID and strep throat from office visit 7/11 without complete resolution  - Patient taking Claritin, Mucinex, Flonase daily without resolution  - Denies any fever, chills, N/V, chest pain, lightheadedness, dizziness  - Physical examination notable for swelling and erythema of the turbinates, oropharyngeal swelling, cobble stoning, red/inflammed tongue   - In office rapid strep test negative   - Lungs CTA b/l   - Suspect symptoms 2/2 to ongoing post viral inflammation prolonged 2/2 to smoking     Plan  - Prednisone 50 mg x3 days  - Humidifier in room at night  - Continue OTC treatment with Mucinex, Flonase, Claritin  - Warm tea with honey  - Cepacol or Chloraseptic lozenges for cough  - F/u in 1 week and if symptoms ongoing will f/u with CXR

## 2023-09-05 NOTE — TELEPHONE ENCOUNTER
When I called the patient to go over TCM questions, she stated that she has had a headache for 3 days. With not medications the pain is at an 8, with oxy and the b powder the pain is at a 4.  I offered the patient to come in this afternoon but she only wants to see you Dr. Jose Miguel Noel. Should she go to the ED? Please advise    Thank you!

## 2023-09-07 ENCOUNTER — OFFICE VISIT (OUTPATIENT)
Dept: FAMILY MEDICINE CLINIC | Facility: OTHER | Age: 68
End: 2023-09-07
Payer: COMMERCIAL

## 2023-09-07 VITALS
DIASTOLIC BLOOD PRESSURE: 80 MMHG | SYSTOLIC BLOOD PRESSURE: 142 MMHG | WEIGHT: 164.4 LBS | TEMPERATURE: 98.2 F | BODY MASS INDEX: 31.04 KG/M2 | HEART RATE: 68 BPM | OXYGEN SATURATION: 92 % | RESPIRATION RATE: 16 BRPM | HEIGHT: 61 IN

## 2023-09-07 DIAGNOSIS — Z23 ENCOUNTER FOR IMMUNIZATION: Primary | ICD-10-CM

## 2023-09-07 DIAGNOSIS — R65.20 SEPSIS WITH ACUTE HYPOXIC RESPIRATORY FAILURE WITHOUT SEPTIC SHOCK, DUE TO UNSPECIFIED ORGANISM (HCC): ICD-10-CM

## 2023-09-07 DIAGNOSIS — M54.16 LUMBAR RADICULOPATHY: ICD-10-CM

## 2023-09-07 DIAGNOSIS — A41.9 SEPSIS WITH ACUTE HYPOXIC RESPIRATORY FAILURE WITHOUT SEPTIC SHOCK, DUE TO UNSPECIFIED ORGANISM (HCC): ICD-10-CM

## 2023-09-07 DIAGNOSIS — F17.218 CIGARETTE NICOTINE DEPENDENCE WITH OTHER NICOTINE-INDUCED DISORDER: ICD-10-CM

## 2023-09-07 DIAGNOSIS — J96.01 SEPSIS WITH ACUTE HYPOXIC RESPIRATORY FAILURE WITHOUT SEPTIC SHOCK, DUE TO UNSPECIFIED ORGANISM (HCC): ICD-10-CM

## 2023-09-07 DIAGNOSIS — J02.9 SORE THROAT: ICD-10-CM

## 2023-09-07 DIAGNOSIS — R93.89 ABNORMAL COMPUTED TOMOGRAPHY ANGIOGRAPHY (CTA): ICD-10-CM

## 2023-09-07 DIAGNOSIS — M79.18 MYOFASCIAL PAIN SYNDROME: ICD-10-CM

## 2023-09-07 DIAGNOSIS — G89.4 PAIN SYNDROME, CHRONIC: ICD-10-CM

## 2023-09-07 DIAGNOSIS — K21.9 GASTROESOPHAGEAL REFLUX DISEASE, UNSPECIFIED WHETHER ESOPHAGITIS PRESENT: ICD-10-CM

## 2023-09-07 PROCEDURE — 99496 TRANSJ CARE MGMT HIGH F2F 7D: CPT | Performed by: FAMILY MEDICINE

## 2023-09-07 PROCEDURE — G0008 ADMIN INFLUENZA VIRUS VAC: HCPCS

## 2023-09-07 PROCEDURE — 87070 CULTURE OTHR SPECIMN AEROBIC: CPT | Performed by: FAMILY MEDICINE

## 2023-09-07 PROCEDURE — 87880 STREP A ASSAY W/OPTIC: CPT | Performed by: FAMILY MEDICINE

## 2023-09-07 PROCEDURE — 90662 IIV NO PRSV INCREASED AG IM: CPT

## 2023-09-07 RX ORDER — OXYCODONE HYDROCHLORIDE 5 MG/1
5 CAPSULE ORAL 2 TIMES DAILY PRN
Qty: 60 CAPSULE | Refills: 0 | Status: ON HOLD | OUTPATIENT
Start: 2023-09-07

## 2023-09-07 RX ORDER — FAMOTIDINE 20 MG/1
20 TABLET, FILM COATED ORAL 2 TIMES DAILY
Qty: 180 TABLET | Refills: 1 | Status: ON HOLD | OUTPATIENT
Start: 2023-09-07

## 2023-09-07 RX ORDER — NALOXONE HYDROCHLORIDE 4 MG/.1ML
SPRAY NASAL
Qty: 1 EACH | Refills: 1 | Status: ON HOLD | OUTPATIENT
Start: 2023-09-07

## 2023-09-07 NOTE — PROGRESS NOTES
Name: Carlos Ho      : 1955      MRN: [id number]  Encounter Provider: Goran Mckoy MD  Encounter Date: 2023   Encounter department: [address]     1. Encounter for immunization  -     influenza vaccine, high-dose, PF 0.7 mL (FLUZONE HIGH-DOSE)    2. Cigarette nicotine dependence with other nicotine-induced disorder  -     nicotine (NICODERM CQ) 7 mg/24hr TD 24 hr patch; Place 1 patch on the skin over 24 hours every 24 hours  -     Ambulatory Referral to Smoking Cessation Program; Future; Expected date: 2023    Discussed risks associated with smoking given current health status and lung findings. Patient has cut down significantly and will need the patch to quit. Referral also provided to smoking cessation program as I feel she will benefit from the support/additional care. 3. Abnormal computed tomography angiography (CTA)  -     Ambulatory Referral to Smoking Cessation Program; Future; Expected date: 2023    Patient will be needing repeat CT of lung for the hilar adenopathy and fullness noted of the left base with some loss of volume. Patient to see pulmonology for this study and follow up on results. She has a visit scheduled on 9/15 to address this. 4. Sepsis with acute hypoxic respiratory failure without septic shock, due to unspecified organism [facility]):  Resolved. Patient breathing normal on RA. No respiratory distress noted. Patient finished course of antibiotics for lung infection and has had no worsening of symptoms. 5. Lumbar radiculopathy  -     oxyCODONE (OXY-IR) 5 MG capsule; Take 1 capsule (5 mg total) by mouth 2 (two) times a day as needed for moderate pain Max Daily Amount: 10 mg    6. Pain syndrome, chronic  -     oxyCODONE (OXY-IR) 5 MG capsule; Take 1 capsule (5 mg total) by mouth 2 (two) times a day as needed for moderate pain Max Daily Amount: 10 mg      8.  Gastroesophageal reflux disease, unspecified whether esophagitis present  -     famotidine (PEPCID) 20 mg tablet; Take 1 tablet (20 mg total) by mouth 2 (two) times a day    9. Sore throat  -     POCT rapid strepA NEG in office will send for culture  Treat with conservative therapy and follow culture results to treat accordingly. -     Throat culture; Future      FU in office in 3 months and or sooner if needed. Subjective      Hospital Course:   Ariana Duffy is a 69 y.o. female patient with a past medical history of current tobacco abuse, HFpEF, CKD stage III, HTN, HLD who originally presented to the hospital on 8/25/2023 due to progressively worsening shortness of breath. Upon arrival patient was tachycardic in the 120s and in acute respiratory failure requiring a nonrebreather. Initial labs demonstrated a leukocytosis of 17.9, D-dimer 0.99, slightly elevated troponins, , CTA chest PE demonstrated an equivocal embolus in the posterior basal left segment lower lobe and mucous plugging in the left mainstem bronchus with volume loss in the left lower lung and left upper lung as well as some emphysema, pulmonary hypertension and mediastinal/hilar adenopathy concerning for neoplasm. Patient was started on a heparin drip. Throughout hospitalization pulmonology was consulted who recommended broadening antibiotics patient was treated with ceftriaxone, azithromycin, vancomycin. Repeat chest x-ray demonstrated Stable volume loss on the left secondary to left lower lobe atelectasis. VAS US did not reveal any evidence of acute or chronic DVT or superficial thrombophlebitis, and pulmonology recommended discontinuing the heparin drip. The patient's blood cultures were negative, MRSA negative, and sputum culture positive for gram - rods, no polys. The patient's Vancomycin and azithromycin were then discontinued, and she was continued on ceftriaxone for a projected course of 7 days. She required 1 dose of IV lasix 40 mg for fluid overload. Pulmonology prescribed hypertonic nebs, atrovent/xenepex, & high frequency chest wall oscillation to aid in mucus expectoration. O2 was weaned throughout her stay, and she was able to remain on RA at the time of discharge. She was discharged with instruction to complete her course of antibiotics using Cefdinir BID x1 day. She was instructed by pulmonology to follow up outpatient and complete a CT chest in 6-8 weeks. The patient was agreeable to this plan. Today patient feels : overall better with breathing but feels tired and weak sllightly. Also having some sore throat more so left sided and feels it upon swallowing. Despite that eating and drinking is not affected. She would like to have the refill of her pain medication today as she might be out. She is unable to take NSAIDs due to stomach irritation and GERD that has been acting up as well. I refilled her Pepcid to take bid prior to breakfast and at bedtime daily until better. She was also advised to avoid eating close to bedtime. Breathing and vitals: are normal today. Her oxygenation on RA is at 92 % and she denies any wheezing or SOB. She has finished the one additional day of antibiotics from hospital for the lung infection. Feels no fever or chills and has been breathing ok. She has a visit with pulmonology in one week on   To discuss repeating the CT scan with hilar adenopathy. Patient is also a smoker and has tried cutting down to 3 ciggarrettes per day now. Discussed smoking cessation and importance given current health status and the risks associated. She is agreeable and agrees to have nicotine patch started. Will also refer to smoking cessation program.  Patient is agreeable and feels needs the extra help. Review of Systems   Constitutional: Positive for fatigue. Negative for appetite change, chills, diaphoresis, fever and unexpected weight change. HENT: Positive for sore throat.  Negative for congestion, ear discharge, ear pain, sinus pressure, sinus pain, trouble swallowing and voice change. Eyes: Negative for visual disturbance. Respiratory: Negative for cough and shortness of breath. Cardiovascular: Negative for chest pain and leg swelling. Gastrointestinal: Negative for abdominal pain, nausea and vomiting. Genitourinary: Negative for dysuria. Musculoskeletal: Positive for arthralgias (chronic) and myalgias. Skin: Negative for pallor and rash. Neurological: Positive for weakness. Negative for dizziness, tremors, facial asymmetry and speech difficulty. Psychiatric/Behavioral: Negative for behavioral problems and confusion. Current Outpatient Medications on File Prior to Visit   Medication Sig   • acetaminophen (TYLENOL) 650 mg CR tablet Take 1 tablet (650 mg total) by mouth every 8 (eight) hours as needed for moderate pain   • albuterol (PROVENTIL HFA,VENTOLIN HFA) 90 mcg/act inhaler Inhale 2 puffs every 6 (six) hours as needed for wheezing or shortness of breath   • amLODIPine (NORVASC) 10 mg tablet TAKE 1 TABLET BY MOUTH EVERY DAY   • ASPIRIN-ACETAMINOPHEN-CAFFEINE PO Take by mouth   • busPIRone (BUSPAR) 30 MG tablet Take 1 tablet (30 mg total) by mouth 3 (three) times a day   • Calcium Carb-Cholecalciferol (OSCAL-D) 500 mg-200 units per tablet Take 1 tablet by mouth 2 (two) times a day with meals   • carvedilol (COREG) 25 mg tablet TAKE 1 TABLET BY MOUTH TWICE A DAY WITH FOOD   • diclofenac sodium (VOLTAREN) 1 % APPLY 2 GRAMS TO AFFECTED AREA 4 TIMES A DAY   • EPINEPHrine (EPIPEN) 0.3 mg/0.3 mL SOAJ Inject 0.3 mL (0.3 mg total) into a muscle once for 1 dose   • fluticasone (FLONASE) 50 mcg/act nasal spray SPRAY 2 SPRAYS INTO EACH NOSTRIL EVERY DAY   • fluticasone-umeclidinium-vilanterol (Trelegy Ellipta) 100-62.5-25 mcg/actuation inhaler Inhale 1 puff daily Rinse mouth after use.    • gabapentin (NEURONTIN) 600 MG tablet Take 1 tablet (600 mg total) by mouth 3 (three) times a day   • lisinopril (ZESTRIL) 5 mg tablet Take 2 tablets (10 mg total) by mouth daily Taking 2 tablets   • loratadine (CLARITIN) 10 mg tablet TAKE 1 TABLET BY MOUTH EVERY DAY   • miconazole (MONISTAT-7) 2 % vaginal cream Insert 1 applicator into the vagina daily at bedtime   • NON FORMULARY Hempvana roll on cream for pain   • predniSONE 20 mg tablet Take 2 tablets (40 mg total) by mouth daily for 2 days, THEN 1.5 tablets (30 mg total) daily for 3 days, THEN 1 tablet (20 mg total) daily for 3 days, THEN 0.5 tablets (10 mg total) daily for 3 days. Do not start before August 31, 2023. • sertraline (ZOLOFT) 50 mg tablet TAKE 1 TABLET BY MOUTH EVERY DAY   • [DISCONTINUED] famotidine (PEPCID) 20 mg tablet Take 1 tablet (20 mg total) by mouth 2 (two) times a day   • [DISCONTINUED] naloxone (NARCAN) 4 mg/0.1 mL nasal spray Administer 1 spray into a nostril. If no response after 2-3 minutes, give another dose in the other nostril using a new spray. • [DISCONTINUED] oxyCODONE (OXY-IR) 5 MG capsule Take 1 capsule (5 mg total) by mouth 2 (two) times a day as needed for moderate pain Max Daily Amount: 10 mg       Objective     /80   Pulse 68   Temp 98.2 °F (36.8 °C)   Resp 16   Ht 5' 1" (1.549 m)   Wt 74.6 kg (164 lb 6.4 oz)   SpO2 92%   BMI 31.06 kg/m²     Physical Exam  Vitals and nursing note reviewed. Constitutional:       General: She is not in acute distress. Appearance: She is well-developed. She is not diaphoretic. HENT:      Head: Normocephalic and atraumatic. Eyes:      General: No scleral icterus. Conjunctiva/sclera: Conjunctivae normal.   Cardiovascular:      Rate and Rhythm: Normal rate and regular rhythm. Heart sounds: Normal heart sounds. No murmur heard. Pulmonary:      Effort: Pulmonary effort is normal. No respiratory distress. Breath sounds: Normal breath sounds. No wheezing. Abdominal:      General: Bowel sounds are normal.      Palpations: Abdomen is soft. Musculoskeletal:         General: Normal range of motion. Cervical back: Normal range of motion and neck supple. No rigidity. Right lower leg: No edema. Left lower leg: No edema. Skin:     General: Skin is warm and dry. Coloration: Skin is not pale. Neurological:      General: No focal deficit present. Mental Status: She is alert and oriented to person, place, and time. Psychiatric:         Thought Content:  Thought content normal.            Lab Results   Component Value Date    WBC 12.25 (H) 08/30/2023    HGB 15.4 08/30/2023    HCT 47.4 (H) 08/30/2023     08/30/2023    CHOL 199 09/30/2015    TRIG 160 (H) 01/24/2023    HDL 45 (L) 01/24/2023    ALT 6 (L) 08/25/2023    AST 14 08/25/2023     09/30/2015    K 4.3 08/30/2023     08/30/2023    CREATININE 1.15 08/30/2023    BUN 26 (H) 08/30/2023    CO2 29 08/30/2023    INR 0.97 08/25/2023    GLUF 98 07/12/2018    HGBA1C 5.5 01/24/2023     Lab Results   Component Value Date    PFG4HNCADRGW 2.423 03/04/2021       Mary Hargrove MD

## 2023-09-08 ENCOUNTER — PATIENT OUTREACH (OUTPATIENT)
Dept: OTHER | Facility: CLINIC | Age: 68
End: 2023-09-08

## 2023-09-08 LAB — S PYO AG THROAT QL: NEGATIVE

## 2023-09-09 LAB — BACTERIA THROAT CULT: NORMAL

## 2023-09-13 ENCOUNTER — APPOINTMENT (EMERGENCY)
Dept: RADIOLOGY | Facility: HOSPITAL | Age: 68
End: 2023-09-13
Payer: COMMERCIAL

## 2023-09-13 ENCOUNTER — HOSPITAL ENCOUNTER (EMERGENCY)
Facility: HOSPITAL | Age: 68
End: 2023-09-13
Attending: INTERNAL MEDICINE
Payer: COMMERCIAL

## 2023-09-13 ENCOUNTER — APPOINTMENT (EMERGENCY)
Dept: CT IMAGING | Facility: HOSPITAL | Age: 68
End: 2023-09-13
Payer: COMMERCIAL

## 2023-09-13 ENCOUNTER — HOSPITAL ENCOUNTER (INPATIENT)
Facility: HOSPITAL | Age: 68
LOS: 82 days | Discharge: LEFT BEFORE DISCHARGE | DRG: 004 | End: 2023-12-04
Attending: STUDENT IN AN ORGANIZED HEALTH CARE EDUCATION/TRAINING PROGRAM | Admitting: STUDENT IN AN ORGANIZED HEALTH CARE EDUCATION/TRAINING PROGRAM
Payer: COMMERCIAL

## 2023-09-13 VITALS
SYSTOLIC BLOOD PRESSURE: 121 MMHG | HEART RATE: 80 BPM | RESPIRATION RATE: 13 BRPM | TEMPERATURE: 98.4 F | OXYGEN SATURATION: 100 % | DIASTOLIC BLOOD PRESSURE: 65 MMHG

## 2023-09-13 DIAGNOSIS — J18.9 RML PNEUMONIA: ICD-10-CM

## 2023-09-13 DIAGNOSIS — T45.1X5A CHEMOTHERAPY INDUCED NEUTROPENIA: ICD-10-CM

## 2023-09-13 DIAGNOSIS — J18.9 PNA (PNEUMONIA): ICD-10-CM

## 2023-09-13 DIAGNOSIS — J96.01 ACUTE RESPIRATORY FAILURE WITH HYPOXIA (HCC): ICD-10-CM

## 2023-09-13 DIAGNOSIS — J39.2 OROPHARYNGEAL MASS: ICD-10-CM

## 2023-09-13 DIAGNOSIS — J18.9 PNEUMONIA OF RIGHT MIDDLE LOBE DUE TO INFECTIOUS ORGANISM: ICD-10-CM

## 2023-09-13 DIAGNOSIS — C85.80 LARGE CELL LYMPHOMA (HCC): Primary | ICD-10-CM

## 2023-09-13 DIAGNOSIS — F32.A DEPRESSION, UNSPECIFIED DEPRESSION TYPE: Chronic | ICD-10-CM

## 2023-09-13 DIAGNOSIS — M79.18 MYOFASCIAL PAIN SYNDROME: Chronic | ICD-10-CM

## 2023-09-13 DIAGNOSIS — D70.1 CHEMOTHERAPY INDUCED NEUTROPENIA: ICD-10-CM

## 2023-09-13 DIAGNOSIS — C83.30 DIFFUSE LARGE B CELL LYMPHOMA (HCC): ICD-10-CM

## 2023-09-13 DIAGNOSIS — T78.3XXD ANGIOEDEMA, SUBSEQUENT ENCOUNTER: ICD-10-CM

## 2023-09-13 DIAGNOSIS — I10 HYPERTENSION: ICD-10-CM

## 2023-09-13 DIAGNOSIS — C83.31 DIFFUSE LARGE B-CELL LYMPHOMA OF LYMPH NODES OF HEAD (HCC): ICD-10-CM

## 2023-09-13 DIAGNOSIS — D61.810 PANCYTOPENIA DUE TO CHEMOTHERAPY (HCC): ICD-10-CM

## 2023-09-13 DIAGNOSIS — I80.9 SUPERFICIAL THROMBOPHLEBITIS: Chronic | ICD-10-CM

## 2023-09-13 DIAGNOSIS — I82.C11 ACUTE EMBOLISM AND THROMBOSIS OF RIGHT INTERNAL JUGULAR VEIN (HCC): ICD-10-CM

## 2023-09-13 DIAGNOSIS — T78.3XXA ANGIOEDEMA, INITIAL ENCOUNTER: Primary | ICD-10-CM

## 2023-09-13 DIAGNOSIS — C85.91 LYMPHOMA OF LYMPH NODES OF HEAD, FACE, AND/OR NECK (HCC): ICD-10-CM

## 2023-09-13 PROBLEM — N17.9 AKI (ACUTE KIDNEY INJURY) (HCC): Status: ACTIVE | Noted: 2023-09-13

## 2023-09-13 LAB
ABO GROUP BLD: NORMAL
ALBUMIN SERPL BCP-MCNC: 3.6 G/DL (ref 3.5–5)
ALP SERPL-CCNC: 59 U/L (ref 34–104)
ALT SERPL W P-5'-P-CCNC: 14 U/L (ref 7–52)
ANION GAP SERPL CALCULATED.3IONS-SCNC: 10 MMOL/L
ARTERIAL PATENCY WRIST A: YES
AST SERPL W P-5'-P-CCNC: 16 U/L (ref 13–39)
BACTERIA UR QL AUTO: ABNORMAL /HPF
BASE EXCESS BLDA CALC-SCNC: -3.4 MMOL/L
BASE EXCESS BLDA CALC-SCNC: -5 MMOL/L (ref -2–3)
BASOPHILS # BLD AUTO: 0.08 THOUSANDS/ÂΜL (ref 0–0.1)
BASOPHILS NFR BLD AUTO: 1 % (ref 0–1)
BILIRUB SERPL-MCNC: 0.54 MG/DL (ref 0.2–1)
BILIRUB UR QL STRIP: ABNORMAL
BLD GP AB SCN SERPL QL: NEGATIVE
BNP SERPL-MCNC: 144 PG/ML (ref 0–100)
BUN SERPL-MCNC: 30 MG/DL (ref 5–25)
CA-I BLD-SCNC: 1.23 MMOL/L (ref 1.12–1.32)
CALCIUM SERPL-MCNC: 9.7 MG/DL (ref 8.4–10.2)
CHLORIDE SERPL-SCNC: 103 MMOL/L (ref 96–108)
CLARITY UR: CLEAR
CO2 SERPL-SCNC: 26 MMOL/L (ref 21–32)
COLOR UR: YELLOW
CREAT SERPL-MCNC: 1.63 MG/DL (ref 0.6–1.3)
EOSINOPHIL # BLD AUTO: 0.22 THOUSAND/ÂΜL (ref 0–0.61)
EOSINOPHIL NFR BLD AUTO: 3 % (ref 0–6)
ERYTHROCYTE [DISTWIDTH] IN BLOOD BY AUTOMATED COUNT: 15.9 % (ref 11.6–15.1)
FIO2 GAS DIL.REBREATH: 50 L
GFR SERPL CREATININE-BSD FRML MDRD: 31 ML/MIN/1.73SQ M
GLUCOSE SERPL-MCNC: 119 MG/DL (ref 65–140)
GLUCOSE SERPL-MCNC: 91 MG/DL (ref 65–140)
GLUCOSE UR STRIP-MCNC: NEGATIVE MG/DL
HCO3 BLDA-SCNC: 20.6 MMOL/L (ref 24–30)
HCO3 BLDA-SCNC: 23.6 MMOL/L (ref 22–28)
HCT VFR BLD AUTO: 48.7 % (ref 34.8–46.1)
HCT VFR BLD CALC: 43 % (ref 34.8–46.1)
HGB BLD-MCNC: 15.6 G/DL (ref 11.5–15.4)
HGB BLDA-MCNC: 14.6 G/DL (ref 11.5–15.4)
HGB UR QL STRIP.AUTO: ABNORMAL
HOROWITZ INDEX BLDA+IHG-RTO: 100 MM[HG]
IMM GRANULOCYTES # BLD AUTO: 0.04 THOUSAND/UL (ref 0–0.2)
IMM GRANULOCYTES NFR BLD AUTO: 1 % (ref 0–2)
INR PPP: 0.92 (ref 0.84–1.19)
KETONES UR STRIP-MCNC: NEGATIVE MG/DL
LEUKOCYTE ESTERASE UR QL STRIP: NEGATIVE
LYMPHOCYTES # BLD AUTO: 1.28 THOUSANDS/ÂΜL (ref 0.6–4.47)
LYMPHOCYTES NFR BLD AUTO: 15 % (ref 14–44)
MCH RBC QN AUTO: 31.5 PG (ref 26.8–34.3)
MCHC RBC AUTO-ENTMCNC: 32 G/DL (ref 31.4–37.4)
MCV RBC AUTO: 98 FL (ref 82–98)
MONOCYTES # BLD AUTO: 0.63 THOUSAND/ÂΜL (ref 0.17–1.22)
MONOCYTES NFR BLD AUTO: 8 % (ref 4–12)
MUCOUS THREADS UR QL AUTO: ABNORMAL
NEUTROPHILS # BLD AUTO: 6.19 THOUSANDS/ÂΜL (ref 1.85–7.62)
NEUTS SEG NFR BLD AUTO: 72 % (ref 43–75)
NITRITE UR QL STRIP: NEGATIVE
NON-SQ EPI CELLS URNS QL MICRO: ABNORMAL /HPF
NRBC BLD AUTO-RTO: 0 /100 WBCS
O2 CT BLDA-SCNC: 24.9 ML/DL (ref 16–23)
OXYHGB MFR BLDA: 94.9 % (ref 94–97)
PCO2 BLD: 22 MMOL/L (ref 21–32)
PCO2 BLD: 37.7 MM HG (ref 42–50)
PCO2 BLDA: 48.9 MM HG (ref 36–44)
PEEP RESPIRATORY: 6 CM[H2O]
PH BLD: 7.35 [PH] (ref 7.3–7.4)
PH BLDA: 7.3 [PH] (ref 7.35–7.45)
PH UR STRIP.AUTO: 5.5 [PH]
PLATELET # BLD AUTO: 309 THOUSANDS/UL (ref 149–390)
PMV BLD AUTO: 9.7 FL (ref 8.9–12.7)
PO2 BLD: 70 MM HG (ref 35–45)
PO2 BLDA: 142.8 MM HG (ref 75–129)
POTASSIUM BLD-SCNC: 4.3 MMOL/L (ref 3.5–5.3)
POTASSIUM SERPL-SCNC: 4 MMOL/L (ref 3.5–5.3)
PROCALCITONIN SERPL-MCNC: 0.09 NG/ML
PROT SERPL-MCNC: 7.7 G/DL (ref 6.4–8.4)
PROT UR STRIP-MCNC: ABNORMAL MG/DL
PROTHROMBIN TIME: 12.7 SECONDS (ref 11.6–14.5)
RBC # BLD AUTO: 4.96 MILLION/UL (ref 3.81–5.12)
RBC #/AREA URNS AUTO: ABNORMAL /HPF
RH BLD: POSITIVE
SAO2 % BLD FROM PO2: 93 % (ref 60–85)
SODIUM BLD-SCNC: 137 MMOL/L (ref 136–145)
SODIUM SERPL-SCNC: 139 MMOL/L (ref 135–147)
SP GR UR STRIP.AUTO: >=1.03 (ref 1–1.03)
SPECIMEN EXPIRATION DATE: NORMAL
SPECIMEN SOURCE: ABNORMAL
SPECIMEN SOURCE: ABNORMAL
UROBILINOGEN UR QL STRIP.AUTO: 0.2 E.U./DL
VENT AC: 12
VT SETTING VENT: 450 ML
WBC # BLD AUTO: 8.44 THOUSAND/UL (ref 4.31–10.16)
WBC #/AREA URNS AUTO: ABNORMAL /HPF

## 2023-09-13 PROCEDURE — 86850 RBC ANTIBODY SCREEN: CPT | Performed by: INTERNAL MEDICINE

## 2023-09-13 PROCEDURE — 82330 ASSAY OF CALCIUM: CPT

## 2023-09-13 PROCEDURE — 71275 CT ANGIOGRAPHY CHEST: CPT

## 2023-09-13 PROCEDURE — 84295 ASSAY OF SERUM SODIUM: CPT

## 2023-09-13 PROCEDURE — 94760 N-INVAS EAR/PLS OXIMETRY 1: CPT

## 2023-09-13 PROCEDURE — 84132 ASSAY OF SERUM POTASSIUM: CPT

## 2023-09-13 PROCEDURE — 96367 TX/PROPH/DG ADDL SEQ IV INF: CPT

## 2023-09-13 PROCEDURE — 82805 BLOOD GASES W/O2 SATURATION: CPT | Performed by: INTERNAL MEDICINE

## 2023-09-13 PROCEDURE — 96375 TX/PRO/DX INJ NEW DRUG ADDON: CPT

## 2023-09-13 PROCEDURE — 99285 EMERGENCY DEPT VISIT HI MDM: CPT

## 2023-09-13 PROCEDURE — 85014 HEMATOCRIT: CPT

## 2023-09-13 PROCEDURE — 71045 X-RAY EXAM CHEST 1 VIEW: CPT

## 2023-09-13 PROCEDURE — 83880 ASSAY OF NATRIURETIC PEPTIDE: CPT | Performed by: INTERNAL MEDICINE

## 2023-09-13 PROCEDURE — 31500 INSERT EMERGENCY AIRWAY: CPT | Performed by: INTERNAL MEDICINE

## 2023-09-13 PROCEDURE — 36600 WITHDRAWAL OF ARTERIAL BLOOD: CPT

## 2023-09-13 PROCEDURE — 85025 COMPLETE CBC W/AUTO DIFF WBC: CPT | Performed by: INTERNAL MEDICINE

## 2023-09-13 PROCEDURE — 94640 AIRWAY INHALATION TREATMENT: CPT

## 2023-09-13 PROCEDURE — 85610 PROTHROMBIN TIME: CPT | Performed by: INTERNAL MEDICINE

## 2023-09-13 PROCEDURE — 93005 ELECTROCARDIOGRAM TRACING: CPT

## 2023-09-13 PROCEDURE — 5A1955Z RESPIRATORY VENTILATION, GREATER THAN 96 CONSECUTIVE HOURS: ICD-10-PCS | Performed by: INTERNAL MEDICINE

## 2023-09-13 PROCEDURE — 80053 COMPREHEN METABOLIC PANEL: CPT | Performed by: INTERNAL MEDICINE

## 2023-09-13 PROCEDURE — G1004 CDSM NDSC: HCPCS

## 2023-09-13 PROCEDURE — 84145 PROCALCITONIN (PCT): CPT

## 2023-09-13 PROCEDURE — 82803 BLOOD GASES ANY COMBINATION: CPT

## 2023-09-13 PROCEDURE — 94002 VENT MGMT INPAT INIT DAY: CPT

## 2023-09-13 PROCEDURE — 96365 THER/PROPH/DIAG IV INF INIT: CPT

## 2023-09-13 PROCEDURE — 99291 CRITICAL CARE FIRST HOUR: CPT | Performed by: INTERNAL MEDICINE

## 2023-09-13 PROCEDURE — 94003 VENT MGMT INPAT SUBQ DAY: CPT

## 2023-09-13 PROCEDURE — 86901 BLOOD TYPING SEROLOGIC RH(D): CPT | Performed by: INTERNAL MEDICINE

## 2023-09-13 PROCEDURE — 36415 COLL VENOUS BLD VENIPUNCTURE: CPT | Performed by: INTERNAL MEDICINE

## 2023-09-13 PROCEDURE — 94150 VITAL CAPACITY TEST: CPT

## 2023-09-13 PROCEDURE — 86900 BLOOD TYPING SEROLOGIC ABO: CPT | Performed by: INTERNAL MEDICINE

## 2023-09-13 PROCEDURE — 87040 BLOOD CULTURE FOR BACTERIA: CPT | Performed by: INTERNAL MEDICINE

## 2023-09-13 PROCEDURE — 82947 ASSAY GLUCOSE BLOOD QUANT: CPT

## 2023-09-13 PROCEDURE — 81001 URINALYSIS AUTO W/SCOPE: CPT | Performed by: INTERNAL MEDICINE

## 2023-09-13 PROCEDURE — 99291 CRITICAL CARE FIRST HOUR: CPT | Performed by: STUDENT IN AN ORGANIZED HEALTH CARE EDUCATION/TRAINING PROGRAM

## 2023-09-13 PROCEDURE — 31500 INSERT EMERGENCY AIRWAY: CPT

## 2023-09-13 RX ORDER — BUSPIRONE HYDROCHLORIDE 10 MG/1
30 TABLET ORAL 3 TIMES DAILY
Status: DISCONTINUED | OUTPATIENT
Start: 2023-09-14 | End: 2023-10-25

## 2023-09-13 RX ORDER — HEPARIN SODIUM 5000 [USP'U]/ML
5000 INJECTION, SOLUTION INTRAVENOUS; SUBCUTANEOUS EVERY 8 HOURS SCHEDULED
Status: DISCONTINUED | OUTPATIENT
Start: 2023-09-13 | End: 2023-09-15

## 2023-09-13 RX ORDER — LEVALBUTEROL INHALATION SOLUTION 1.25 MG/3ML
1.25 SOLUTION RESPIRATORY (INHALATION) EVERY 8 HOURS PRN
Status: DISCONTINUED | OUTPATIENT
Start: 2023-09-13 | End: 2023-10-11

## 2023-09-13 RX ORDER — DIPHENHYDRAMINE HYDROCHLORIDE 50 MG/ML
25 INJECTION INTRAMUSCULAR; INTRAVENOUS EVERY 6 HOURS
Status: DISCONTINUED | OUTPATIENT
Start: 2023-09-13 | End: 2023-09-15

## 2023-09-13 RX ORDER — ETOMIDATE 2 MG/ML
20 INJECTION INTRAVENOUS ONCE
Status: COMPLETED | OUTPATIENT
Start: 2023-09-13 | End: 2023-09-13

## 2023-09-13 RX ORDER — CEFTRIAXONE 1 G/50ML
1000 INJECTION, SOLUTION INTRAVENOUS ONCE
Status: COMPLETED | OUTPATIENT
Start: 2023-09-13 | End: 2023-09-13

## 2023-09-13 RX ORDER — DIPHENHYDRAMINE HYDROCHLORIDE 50 MG/ML
25 INJECTION INTRAMUSCULAR; INTRAVENOUS ONCE
Status: COMPLETED | OUTPATIENT
Start: 2023-09-13 | End: 2023-09-13

## 2023-09-13 RX ORDER — FAMOTIDINE 10 MG/ML
20 INJECTION, SOLUTION INTRAVENOUS
Status: DISCONTINUED | OUTPATIENT
Start: 2023-09-13 | End: 2023-09-21

## 2023-09-13 RX ORDER — FENTANYL CITRATE 50 UG/ML
INJECTION, SOLUTION INTRAMUSCULAR; INTRAVENOUS
Status: COMPLETED
Start: 2023-09-13 | End: 2023-09-14

## 2023-09-13 RX ORDER — VANCOMYCIN HYDROCHLORIDE 1 G/200ML
15 INJECTION, SOLUTION INTRAVENOUS ONCE AS NEEDED
Status: DISCONTINUED | OUTPATIENT
Start: 2023-09-13 | End: 2023-09-15

## 2023-09-13 RX ORDER — PROPOFOL 10 MG/ML
INJECTION, EMULSION INTRAVENOUS
Status: COMPLETED
Start: 2023-09-13 | End: 2023-09-13

## 2023-09-13 RX ORDER — DEXAMETHASONE SODIUM PHOSPHATE 4 MG/ML
8 INJECTION, SOLUTION INTRA-ARTICULAR; INTRALESIONAL; INTRAMUSCULAR; INTRAVENOUS; SOFT TISSUE EVERY 8 HOURS SCHEDULED
Status: DISCONTINUED | OUTPATIENT
Start: 2023-09-13 | End: 2023-09-16

## 2023-09-13 RX ORDER — DEXAMETHASONE SODIUM PHOSPHATE 10 MG/ML
10 INJECTION, SOLUTION INTRAMUSCULAR; INTRAVENOUS ONCE
Status: COMPLETED | OUTPATIENT
Start: 2023-09-13 | End: 2023-09-13

## 2023-09-13 RX ORDER — FENTANYL CITRATE 50 UG/ML
50 INJECTION, SOLUTION INTRAMUSCULAR; INTRAVENOUS ONCE
Status: COMPLETED | OUTPATIENT
Start: 2023-09-13 | End: 2023-09-13

## 2023-09-13 RX ORDER — CHLORHEXIDINE GLUCONATE ORAL RINSE 1.2 MG/ML
15 SOLUTION DENTAL EVERY 12 HOURS SCHEDULED
Status: DISCONTINUED | OUTPATIENT
Start: 2023-09-13 | End: 2023-12-04 | Stop reason: HOSPADM

## 2023-09-13 RX ORDER — PROPOFOL 10 MG/ML
5-50 INJECTION, EMULSION INTRAVENOUS
Status: DISCONTINUED | OUTPATIENT
Start: 2023-09-13 | End: 2023-09-17

## 2023-09-13 RX ORDER — FENTANYL CITRATE-0.9 % NACL/PF 10 MCG/ML
150 PLASTIC BAG, INJECTION (ML) INTRAVENOUS CONTINUOUS
Status: DISCONTINUED | OUTPATIENT
Start: 2023-09-13 | End: 2023-09-18

## 2023-09-13 RX ORDER — SODIUM CHLORIDE, SODIUM GLUCONATE, SODIUM ACETATE, POTASSIUM CHLORIDE, MAGNESIUM CHLORIDE, SODIUM PHOSPHATE, DIBASIC, AND POTASSIUM PHOSPHATE .53; .5; .37; .037; .03; .012; .00082 G/100ML; G/100ML; G/100ML; G/100ML; G/100ML; G/100ML; G/100ML
100 INJECTION, SOLUTION INTRAVENOUS CONTINUOUS
Status: DISCONTINUED | OUTPATIENT
Start: 2023-09-13 | End: 2023-09-15

## 2023-09-13 RX ORDER — PROPOFOL 10 MG/ML
50 INJECTION, EMULSION INTRAVENOUS
Status: DISCONTINUED | OUTPATIENT
Start: 2023-09-13 | End: 2023-09-13 | Stop reason: HOSPADM

## 2023-09-13 RX ORDER — SUCCINYLCHOLINE/SOD CL,ISO/PF 100 MG/5ML
1.5 SYRINGE (ML) INTRAVENOUS ONCE
Status: COMPLETED | OUTPATIENT
Start: 2023-09-13 | End: 2023-09-13

## 2023-09-13 RX ADMIN — IOHEXOL 85 ML: 350 INJECTION, SOLUTION INTRAVENOUS at 13:38

## 2023-09-13 RX ADMIN — LEVALBUTEROL HYDROCHLORIDE 1.25 MG: 1.25 SOLUTION RESPIRATORY (INHALATION) at 19:42

## 2023-09-13 RX ADMIN — PROPOFOL 30 MCG/KG/MIN: 10 INJECTION, EMULSION INTRAVENOUS at 22:17

## 2023-09-13 RX ADMIN — IPRATROPIUM BROMIDE 0.5 MG: 0.5 SOLUTION RESPIRATORY (INHALATION) at 19:42

## 2023-09-13 RX ADMIN — NYSTATIN 500000 UNITS: 100000 SUSPENSION ORAL at 18:45

## 2023-09-13 RX ADMIN — SODIUM CHLORIDE, SODIUM GLUCONATE, SODIUM ACETATE, POTASSIUM CHLORIDE AND MAGNESIUM CHLORIDE 100 ML/HR: 526; 502; 368; 37; 30 INJECTION, SOLUTION INTRAVENOUS at 18:45

## 2023-09-13 RX ADMIN — HEPARIN SODIUM 5000 UNITS: 5000 INJECTION INTRAVENOUS; SUBCUTANEOUS at 16:44

## 2023-09-13 RX ADMIN — Medication 150 MCG/HR: at 22:12

## 2023-09-13 RX ADMIN — PROPOFOL 50 MCG/KG/MIN: 10 INJECTION, EMULSION INTRAVENOUS at 12:55

## 2023-09-13 RX ADMIN — HEPARIN SODIUM 5000 UNITS: 5000 INJECTION INTRAVENOUS; SUBCUTANEOUS at 21:59

## 2023-09-13 RX ADMIN — NYSTATIN 500000 UNITS: 100000 SUSPENSION ORAL at 21:59

## 2023-09-13 RX ADMIN — CEFTRIAXONE 1000 MG: 1 INJECTION, SOLUTION INTRAVENOUS at 14:01

## 2023-09-13 RX ADMIN — CHLORHEXIDINE GLUCONATE 15 ML: 1.2 SOLUTION ORAL at 21:59

## 2023-09-13 RX ADMIN — PIPERACILLIN AND TAZOBACTAM 3.38 G: 36; 4.5 INJECTION, POWDER, FOR SOLUTION INTRAVENOUS at 19:50

## 2023-09-13 RX ADMIN — DIPHENHYDRAMINE HYDROCHLORIDE 25 MG: 50 INJECTION, SOLUTION INTRAMUSCULAR; INTRAVENOUS at 11:59

## 2023-09-13 RX ADMIN — Medication 112 MG: at 12:39

## 2023-09-13 RX ADMIN — DOXYCYCLINE 100 MG: 100 INJECTION, POWDER, LYOPHILIZED, FOR SOLUTION INTRAVENOUS at 14:32

## 2023-09-13 RX ADMIN — PROPOFOL 1000000 MCG: 10 INJECTION, EMULSION INTRAVENOUS at 16:44

## 2023-09-13 RX ADMIN — FENTANYL CITRATE 50 MCG: 50 INJECTION INTRAMUSCULAR; INTRAVENOUS at 18:07

## 2023-09-13 RX ADMIN — Medication 100 MCG/HR: at 16:40

## 2023-09-13 RX ADMIN — FAMOTIDINE 20 MG: 10 INJECTION INTRAVENOUS at 18:43

## 2023-09-13 RX ADMIN — DIPHENHYDRAMINE HYDROCHLORIDE 25 MG: 50 INJECTION, SOLUTION INTRAMUSCULAR; INTRAVENOUS at 18:29

## 2023-09-13 RX ADMIN — DEXAMETHASONE SODIUM PHOSPHATE 10 MG: 10 INJECTION, SOLUTION INTRAMUSCULAR; INTRAVENOUS at 11:59

## 2023-09-13 RX ADMIN — VANCOMYCIN HYDROCHLORIDE 1500 MG: 5 INJECTION, POWDER, LYOPHILIZED, FOR SOLUTION INTRAVENOUS at 20:43

## 2023-09-13 RX ADMIN — ETOMIDATE 20 MG: 20 INJECTION, SOLUTION INTRAVENOUS at 12:37

## 2023-09-13 RX ADMIN — FENTANYL CITRATE 50 MCG: 50 INJECTION INTRAMUSCULAR; INTRAVENOUS at 18:08

## 2023-09-13 RX ADMIN — DEXAMETHASONE SODIUM PHOSPHATE 8 MG: 4 INJECTION INTRA-ARTICULAR; INTRALESIONAL; INTRAMUSCULAR; INTRAVENOUS; SOFT TISSUE at 21:59

## 2023-09-13 NOTE — ASSESSMENT & PLAN NOTE
Home regimen: Oxycodone 5 mg twice daily as needed. Tylenol 650 mg every 8 hours as needed. Gabapentin 60 mg 3 times daily. Medical marijuana. Per daughter, patient was abusing Tylenol over-the-counter. May start oxycodone 5 mg twice daily as needed after extubated.

## 2023-09-13 NOTE — RESPIRATORY THERAPY NOTE
RT Protocol Note  Marva Rolling 69 y.o. female MRN: [id number]  Unit/Bed#: ICU 06 Encounter: [id number]    Assessment    Principal Problem:    Angioedema  Active Problems:    Benign essential hypertension    Hyperlipidemia    Pain syndrome, chronic    Acute respiratory failure with hypoxia (HCC)    (HFpEF) heart failure with preserved ejection fraction (HCC)    Ambulatory dysfunction      Home Pulmonary Medications:  Home Bronchodilator/Trelegy       Past Medical History:   Diagnosis Date    Anxiety     Depression     Fatty liver     Hyperlipidemia     Hypertension     Psychiatric disorder     Varicella      Social History     Socioeconomic History    Marital status:      Spouse name: Not on file    Number of children: Not on file    Years of education: Not on file    Highest education level: Not on file   Occupational History    Occupation: retired   Tobacco Use    Smoking status: Every Day     Packs/day: 1.00     Types: Cigarettes    Smokeless tobacco: Never    Tobacco comments:     2 Black and milds per day   Vaping Use    Vaping Use: Never used   Substance and Sexual Activity    Alcohol use: Not Currently    Drug use: Yes     Types: Marijuana     Comment: for pain    Sexual activity: Not Currently     Partners: Male     Birth control/protection: None   Other Topics Concern    Not on file   Social History Narrative    Educational level-special ed/completed Master's Degree     Social Determinants of Health     Financial Resource Strain: Low Risk  (1/5/2023)    Overall Financial Resource Strain (CARDIA)     Difficulty of Paying Living Expenses: Not hard at all   Food Insecurity: Not on file   Transportation Needs: No Transportation Needs (8/28/2023)    PRAPARE - Transportation     Lack of Transportation (Medical): No     Lack of Transportation (Non-Medical):  No   Physical Activity: Not on file   Stress: Not on file   Social Connections: Not on file   Intimate Partner Violence: Not on file   Housing Stability: Not on file       Subjective         Objective    Physical Exam:   Assessment Type: (P) During-treatment  General Appearance: (P) Sedated  Respiratory Pattern: (P) Assisted  Chest Assessment: (P) Chest expansion symmetrical  Bilateral Breath Sounds: (P) Clear  Cough: (P) None  Suction: (P) ET Tube  O2 Device: (P) Vent    Vitals:  SpO2 92 %. Results from last 7 days   Lab Units 09/13/23  1345   PH ART  7.301*   PCO2 ART mm Hg 48.9*   PO2 ART mm Hg 142.8*   HCO3 ART mmol/L 23.6   BASE EXC ART mmol/L -3.4   O2 CONTENT ART mL/dL 24.9*   O2 HGB, ARTERIAL % 94.9   ABG SOURCE  Radial, Left   GENO TEST  Yes       Imaging and other studies: I have personally reviewed pertinent reports. O2 Device: (P) Vent     Plan    Respiratory Plan: (P) Vent/NIV/HFNC, Home Bronchodilator Patient pathway        Resp Comments: (P) Pt placed on [facility] 16/400/6/50%  Received pt from [facility] ([facility]), placed on vent. 09/13/23 1633   Respiratory Protocol   Protocol Initiated? Yes   Protocol Selection Airway Clearance; Respiratory   Language Barrier? No   Medical & Social History Reviewed? Yes   Diagnostic Studies Reviewed? Yes   Physical Assessment Performed? Yes   Respiratory Plan Vent/NIV/HFNC; Home Bronchodilator Patient pathway   Respiratory Assessment   Assessment Type During-treatment   General Appearance Sedated   Respiratory Pattern Assisted   Chest Assessment Chest expansion symmetrical   Bilateral Breath Sounds Clear   Cough None   Suction ET Tube   Resp Comments Pt placed on Sc[facility] 16/400/6/50%   O2 Device Vent   Airway Suctioning/Secretions   Suction Type Endotracheal tube   Suction Device Inline   Secretion Amount Moderate   Secretion Color White;Clear   Secretion Consistency Thin   Suction Tolerance Tolerated well   Suctioning Adverse Effects None

## 2023-09-13 NOTE — H&P
[id number] [facility]  H&P  Name: Melissa Esquivel 69 y.o. female I MRN: [id number]  Unit/Bed#: ICU 06 I Date of Admission: 9/13/2023   Date of Service: 9/13/2023 I Hospital Day: 0      Assessment/Plan   * Angioedema  Assessment & Plan  • POA with acute respiratory failure, SOB. • On physical in [place] [facility] ED: Swollen tongue, swelling soft and hard palate and almost the head of all phalanx is completely closed. Severe angioedema. • Decadron 10 mg, Benadryl 25 mg given. • Initially refused but eventually agreed with intubation. • Prior episode of angioedema in 2020 noted. Suspected coadministration of increase the dose of tramadol and lisinopril. • Per daughter, there is no recent change in medications except Trelegy inhaler. • Etiology: Unclear. ICU physical limited by intubation. Negative for swollen tongue. Intubation cough gas leak test still positive for swollen airway. Plan:  • Continue intubation. SBT tomorrow. • Start Decadron and Benadryl. • Consider immunology/allergy follow-up as outpatient. • CT facial with contrast if creatinine improves. Pain syndrome, chronic  Assessment & Plan  Home regimen: Oxycodone 5 mg twice daily as needed. Tylenol 650 mg every 8 hours as needed. Gabapentin 60 mg 3 times daily. Medical marijuana. Per daughter, patient was abusing Tylenol over-the-counter. May start oxycodone 5 mg twice daily as needed after extubated.     Hyperlipidemia  Assessment & Plan  Lab Results   Component Value Date    CHOLESTEROL 203 (H) 01/24/2023    CHOLESTEROL 177 08/11/2022    CHOLESTEROL 184 07/08/2020     Lab Results   Component Value Date    HDL 45 (L) 01/24/2023    HDL 37 (L) 08/11/2022    HDL 44 (L) 07/08/2020     Lab Results   Component Value Date    TRIG 160 (H) 01/24/2023    TRIG 108 08/11/2022    TRIG 113 07/08/2020     Lab Results   Component Value Date    NONHDLC 146 07/12/2018    [address] [facility] 207 (H) 04/29/2013     Continue diet/ lifestyle modification. Benign essential hypertension  Assessment & Plan  Well controlled at home. Home meds: Amlodipine 10 mg daily, Coreg 25 mg twice daily, lisinopril 10 mg daily. In ICU: BP 98/52. Will hold amlodipine, Coreg and lisinopril at this moment. PO (acute kidney injury) [facility])  Assessment & Plan  Lab Results   Component Value Date    CREATININE 1.63 (H) 09/13/2023    CREATININE 1.15 08/30/2023    CREATININE 1.22 08/29/2023       Plan:  • UA w/ microscopic, Urinary retention protocol, Bladder scan, Daily weights, I/O  • IV Fluids: 100 cc/hr. • Monitor BMP daily and observe for downward trend of creatinine  • Avoid hypoperfusion of the kidneys, minimize nephrotoxins  • RAAS Blockers/Diuretics held: Lisinopril. New onset right middle lobe pneumonia  Assessment & Plan  · 9/13 CT PE study: Patchy consolidation right middle lobe, new from 8/25/2023, compatible with pneumonia. · No leukocytosis, no fever/chills. · Positive for sore throat, cough. · Recent hospitalization for pneumonia noted. · New onset pneumonia after recent hospitalization and antibiotics treatment. Plan:  · Start Zosyn and vancomycin for broad coverage-suspected HAP. · Follow-up blood culture. · Sputum culture if extubated. · MRSA swab. Ambulatory dysfunction  Assessment & Plan  PT/ OT eval.    (HFpEF) heart failure with preserved ejection fraction [facility])  Assessment & Plan  Wt Readings from Last 3 Encounters:   09/07/23 74.6 kg (164 lb 6.4 oz)   08/30/23 73.3 kg (161 lb 9.6 oz)   08/11/23 74.8 kg (165 lb)     Lab Results   Component Value Date    LVEF 60 08/28/2023     (H) 09/13/2023     (H) 08/25/2023     • Volume status: Euvolemic. • POA physical (limited): JVD-, HJR-, B/L LE trace to 1+ edema. • Echo 8/28/23: LVEF 60%. D1DD.   • Likely not acute exacerbation.     Plan:  • CMP, magnesium tomorrow a.m; Goal Mg > 2 and K > 4; Replete prn  • HOB > 30°, Daily standing weights, Measure I/O    Acute respiratory failure with hypoxia ([address])  Assessment & Plan  • POA with O2 saturate around 80% on room air. Needed high flow 60 L to bring up her O2 saturation to 95%. • Recent hospitalization for respiratory failure second to pneumonia. • CTA PE study negative for PE. New onset RML PNA. • Persistent partial atelectasis of the lower lobes noted. • Current daily smoker. • Less likely second to acute exacerbation of CHF.     Plan:  • Continue intubation. Scheduled earlier ventilation elaboration tomorrow. • P.o. prednisone. • Continue ICS Trelegy. Xopenex/Atrovent as needed. • Airway clearance protocol. IS. History of Present Illness     HPI: Leeanna Powell is a 69 y.o. who has PMH of HTN, HLD, Fatty liver, daily smoker, chronic pain syndrome, bipolar disorder initially presented to [facility] ED with respiratory distress starting from last night. She has been recently discharged from hospital 3 weeks ago for pneumonia and respiratory failure. Most of history was provided by patient's daughter who is living with her since 2020. Per Carolann Robison, patient was complaining about gum throbbing/swallowing since 9/3 with left-sided facial swelling. It has been progressively worsening till this morning that patient was not able to breath easily 2/2 swollen tongue. Patient's O2 saturation POA was lower at 83%. She was given high flow 60 L oxygen and helped bring her O2 saturation to 95%. On physical, She was having swollen tongue swelling soft and hard palate and almost the head of all phalanx is completely closed. Severe angioedema. Patient initially refused intubation for field times but eventually agreed for intubation per ER provider. Patient is on lisinopril according to the family members. Prior episode of angioedema noted in 2020, suspected coadministration of increased dose of tramadol with lisinopril.  Linnette denied patient's recent medication change, denied new detergent, carpets, pets, etc. denied recent fever/chills, chest pain, palpitation, diarrhea, constipation, urinary symptoms. Patient to be transferred from [address] [place] [address] to [facility] ICU unit. History obtained from patient's daughter primarily. ROS limited. Review of Systems   Constitutional: Positive for appetite change (only soup) and fatigue. Negative for chills and fever. HENT: Positive for facial swelling, sore throat and trouble swallowing. Negative for congestion, ear discharge, ear pain, hearing loss, rhinorrhea, sinus pressure, sinus pain and tinnitus. Eyes: Negative for pain and visual disturbance. Respiratory: Positive for shortness of breath and stridor. Negative for cough and choking. Cardiovascular: Positive for leg swelling. Negative for chest pain and palpitations. Gastrointestinal: Positive for nausea. Negative for abdominal pain, constipation, diarrhea and vomiting. Endocrine: Positive for polyuria. Genitourinary: Positive for frequency. Negative for dysuria, flank pain, hematuria and urgency. Musculoskeletal: Positive for back pain and gait problem. Negative for arthralgias. Skin: Negative for color change and rash. Neurological: Positive for light-headedness. Negative for seizures, syncope, facial asymmetry and headaches. All other systems reviewed and are negative. Historical Information   Past Medical History:  No date:  Anxiety  No date: Depression  No date: Fatty liver  No date: Hyperlipidemia  No date: Hypertension  No date: Psychiatric disorder  No date: Varicella Past Surgical History:  No date: BREAST SURGERY; Bilateral      Comment:  Reduction Procedure  No date: CARDIAC SURGERY  No date:  SECTION      Comment:  x4  No date: DILATION AND CURETTAGE OF UTERUS  No date: ECTOPIC PREGNANCY SURGERY   Current Outpatient Medications   Medication Instructions   • acetaminophen (TYLENOL) 650 mg, Oral, Every 8 hours PRN   • albuterol (PROVENTIL HFA,VENTOLIN HFA) 90 mcg/act inhaler 2 puffs, Inhalation, Every 6 hours PRN   • amLODIPine (NORVASC) 10 mg tablet TAKE 1 TABLET BY MOUTH EVERY DAY   • ASPIRIN-ACETAMINOPHEN-CAFFEINE PO Oral   • busPIRone (BUSPAR) 30 mg, Oral, 3 times daily   • Calcium Carb-Cholecalciferol (OSCAL-D) 500 mg-200 units per tablet 1 tablet, Oral, 2 times daily with meals   • carvedilol (COREG) 25 mg tablet TAKE 1 TABLET BY MOUTH TWICE A DAY WITH FOOD   • diclofenac sodium (VOLTAREN) 1 % APPLY 2 GRAMS TO AFFECTED AREA 4 TIMES A DAY   • EPINEPHrine (EPIPEN) 0.3 mg, Intramuscular, Once   • famotidine (PEPCID) 20 mg, Oral, 2 times daily   • fluticasone (FLONASE) 50 mcg/act nasal spray SPRAY 2 SPRAYS INTO EACH NOSTRIL EVERY DAY   • fluticasone-umeclidinium-vilanterol (Trelegy Ellipta) 100-62.5-25 mcg/actuation inhaler 1 puff, Inhalation, Daily, Rinse mouth after use. • gabapentin (NEURONTIN) 600 mg, Oral, 3 times daily   • lisinopril (ZESTRIL) 10 mg, Oral, Daily, Taking 2 tablets   • loratadine (CLARITIN) 10 mg, Oral, Daily   • miconazole (MONISTAT-7) 2 % vaginal cream 1 applicator, Vaginal, Daily at bedtime   • naloxone (NARCAN) 4 mg/0.1 mL nasal spray Administer 1 spray into a nostril. If no response after 2-3 minutes, give another dose in the other nostril using a new spray.    • nicotine (NICODERM CQ) 7 mg/24hr TD 24 hr patch 1 patch, Transdermal, Every 24 hours   • NON FORMULARY Hempvana roll on cream for pain    • oxyCODONE (OXY-IR) 5 mg, Oral, 2 times daily PRN   • sertraline (ZOLOFT) 50 mg tablet TAKE 1 TABLET BY MOUTH EVERY DAY    Allergies   Allergen Reactions   • Methyldopa Shortness Of Breath and Other (See Comments)     Aldomet      Social History     Tobacco Use   • Smoking status: Every Day     Packs/day: 1.00     Types: Cigarettes   • Smokeless tobacco: Never   • Tobacco comments:     2 Black and milds per day   Vaping Use   • Vaping Use: Never used   Substance Use Topics   • Alcohol use: Not Currently   • Drug use: Yes     Types: Marijuana     Comment: for pain    Family History Problem Relation Age of Onset   • Lung cancer Mother    • Cirrhosis Father    • Hypertension Sister    • Bipolar disorder Sister    • Heart disease Sister    • Diabetes Family    • Heart disease Family    • Hypertension Family    • Stroke Family    • Thyroid disease Family           Objective                            Vitals I/O      Most Recent Min/Max in 24hrs   Temp 99 °F (37.2 °C) Temp  Min: 98.4 °F (36.9 °C)  Max: 99 °F (37.2 °C)   Pulse 76 Pulse  Min: 68  Max: 113   Resp 16 Resp  Min: 12  Max: 28   /79 BP  Min: 92/56  Max: 170/102   O2 Sat 95 % SpO2  Min: 83 %  Max: 100 %      Intake/Output Summary (Last 24 hours) at 9/13/2023 1821  Last data filed at 9/13/2023 1700  Gross per 24 hour   Intake 103.33 ml   Output 300 ml   Net -196.67 ml         Diet NPO     Invasive Monitoring Physical exam   Intubated. Physical Exam  Eyes:      General: No scleral icterus. Right eye: No discharge. Left eye: No discharge. Skin:     General: Skin is warm. Capillary Refill: Capillary refill takes less than 2 seconds. HENT:      Head: Normocephalic and atraumatic. Right Ear: No drainage. Left Ear: No drainage. Nose: No rhinorrhea, epistaxis or nasogastric tube. Mouth/Throat:      Mouth: Mucous membranes are moist. No injury. Comments: Drooling  Physical limited second to intubation. Grossly normal size tongue- no swelling. No swelling lips. Neck:     Vascular: No JVD. Cardiovascular:      Rate and Rhythm: Normal rate and regular rhythm. Pulses: Normal pulses. Heart sounds: Normal heart sounds. Musculoskeletal:         General: Swelling present. Right lower leg: No edema. Left lower leg: Trace Edema present. Abdominal:      Palpations: Abdomen is soft. Constitutional:       Interventions: She is sedated and intubated. Pulmonary:      Effort: She is intubated. Breath sounds: Wheezing and rhonchi present. No rales.       Comments: Intubated. Neurological:      Cranial Nerves: No facial asymmetry. Comments: Sedated. Genitourinary/Anorectal:  [place]         Diagnostic Studies      EKG: NSR 80. Imaging:   CXR and CTA PE study. I have personally reviewed pertinent reports.    and I have personally reviewed pertinent films in PACS     Medications:  Scheduled PRN   [START ON 9/14/2023] busPIRone, 30 mg, TID  chlorhexidine, 15 mL, Q12H ASHLEY  dexamethasone, 8 mg, Q8H ASHLEY  diphenhydrAMINE, 25 mg, Q6H  famotidine, 20 mg, Q24H ASHLEY  fentanyl citrate (PF), ,   heparin (porcine), 5,000 Units, Q8H [facility]  ipratropium, 0.5 mg, TID  nystatin, 500,000 Units, 4x Daily  piperacillin-tazobactam, 3.375 g, Q6H  [START ON 9/14/2023] sertraline, 50 mg, Daily  vancomycin, 15 mg/kg, Q12H      fentanyl citrate (PF), ,   levalbuterol, 1.25 mg, Q8H PRN       Continuous    fentaNYL, 150 mcg/hr, Last Rate: 150 mcg/hr (09/13/23 1808)  multi-electrolyte, 100 mL/hr  propofol, 5-50 mcg/kg/min         Labs:    CBC    Recent Labs     09/13/23  1158 09/13/23  1654   WBC 8.44  --    HGB 15.6* 14.6   HCT 48.7* 43     --      BMP    Recent Labs     09/13/23  1158 09/13/23  1654   SODIUM 139  --    K 4.0  --      --    CO2 26 22   AGAP 10  --    BUN 30*  --    CREATININE 1.63*  --    CALCIUM 9.7  --        Coags    Recent Labs     09/13/23  1158   INR 0.92        Additional Electrolytes  Recent Labs     09/13/23  1654   CAIONIZED 1.23          Blood Gas    Recent Labs     09/13/23  1345   PHART 7.301*   [id number] 48.9*   PO2ART 142.8*   [id number] 23.6   BEART -3.4   SOURCE Radial, Left     Recent Labs     09/13/23  1345   SOURCE Radial, Left    LFTs  Recent Labs     09/13/23  1158   ALT 14   AST 16   ALKPHOS 59   ALB 3.6   TBILI 0.54       Infectious  Recent Labs     09/13/23  1158   PROCALCITONI 0.09     Glucose  Recent Labs     09/13/23  1158   GLUC 91             Ángel Thurston MD

## 2023-09-13 NOTE — ED PROVIDER NOTES
History  Chief Complaint   Patient presents with   • Shortness of Breath     Reports sob since being D/C from the hospital 3 weeks ago for pneumonia. Pt feels fatigue. This is 69years old came for having SOB. Patient went into respiratory distress especially since last night. Patient came with having pulse ox 83% on room air. Patient was put on oxygen 60 L and her pulse ox to go up to 95%. Patient has productive cough of whitish sputum. Patient has no CP. Patient is on lisinopril according to the family members and she came having swollen tongue swelling soft and hard palate and almost the head of all phalanx is completely closed. This is severe angioedema. Explained to the patient that she need to be intubated immediately and patient refused. I stated that more than 10 minutes with the patient trying to convince her that this is very important otherwise his airway will be completely closed and she will go into hypoxia and she may die she insists on refusing. Family members around the patient including her daughter trying to convince her to have that she should be intubated immediately patient is still keeps insisting refusing. Other staff members try to ask her if to agree for intubation but patient insists on refusing. Patient told that she can lose her life like this patient insist.  Patient was admitted to [facility] on 8/25/2023 for having mucous plugging of the left main bronchus. Patient was treated with antibiotics. Patient also was put on heparin for 4 days and then it was stopped and patient was sent home with no anticoagulants. There is a possibility of the PE. Patient discharged was no oxygen at home. Prior to Admission Medications   Prescriptions Last Dose Informant Patient Reported? Taking?    ASPIRIN-ACETAMINOPHEN-CAFFEINE PO  Self Yes No   Sig: Take by mouth   Calcium Carb-Cholecalciferol (OSCAL-D) 500 mg-200 units per tablet  Self Yes No   Sig: Take 1 tablet by mouth 2 (two) times a day with meals   EPINEPHrine (EPIPEN) 0.3 mg/0.3 mL SOAJ  Self No No   Sig: Inject 0.3 mL (0.3 mg total) into a muscle once for 1 dose   NON FORMULARY  Self Yes No   Sig: Hempvana roll on cream for pain   acetaminophen (TYLENOL) 650 mg CR tablet  Self No No   Sig: Take 1 tablet (650 mg total) by mouth every 8 (eight) hours as needed for moderate pain   albuterol (PROVENTIL HFA,VENTOLIN HFA) 90 mcg/act inhaler  Self No No   Sig: Inhale 2 puffs every 6 (six) hours as needed for wheezing or shortness of breath   amLODIPine (NORVASC) 10 mg tablet  Self No No   Sig: TAKE 1 TABLET BY MOUTH EVERY DAY   busPIRone (BUSPAR) 30 MG tablet  Self No No   Sig: Take 1 tablet (30 mg total) by mouth 3 (three) times a day   carvedilol (COREG) 25 mg tablet  Self No No   Sig: TAKE 1 TABLET BY MOUTH TWICE A DAY WITH FOOD   diclofenac sodium (VOLTAREN) 1 %  Self No No   Sig: APPLY 2 GRAMS TO AFFECTED AREA 4 TIMES A DAY   famotidine (PEPCID) 20 mg tablet   No No   Sig: Take 1 tablet (20 mg total) by mouth 2 (two) times a day   fluticasone (FLONASE) 50 mcg/act nasal spray  Self No No   Sig: SPRAY 2 SPRAYS INTO EACH NOSTRIL EVERY DAY   fluticasone-umeclidinium-vilanterol (Trelegy Ellipta) 100-62.5-25 mcg/actuation inhaler  Self No No   Sig: Inhale 1 puff daily Rinse mouth after use.   gabapentin (NEURONTIN) 600 MG tablet  Self No No   Sig: Take 1 tablet (600 mg total) by mouth 3 (three) times a day   lisinopril (ZESTRIL) 5 mg tablet  Self No No   Sig: Take 2 tablets (10 mg total) by mouth daily Taking 2 tablets   loratadine (CLARITIN) 10 mg tablet  Self No No   Sig: TAKE 1 TABLET BY MOUTH EVERY DAY   miconazole (MONISTAT-7) 2 % vaginal cream  Self No No   Sig: Insert 1 applicator into the vagina daily at bedtime   naloxone (NARCAN) 4 mg/0.1 mL nasal spray   No No   Sig: Administer 1 spray into a nostril. If no response after 2-3 minutes, give another dose in the other nostril using a new spray.    nicotine (Dariusz Dais) 7 mg/24hr TD 24 hr patch   No No   Sig: Place 1 patch on the skin over 24 hours every 24 hours   oxyCODONE (OXY-IR) 5 MG capsule   No No   Sig: Take 1 capsule (5 mg total) by mouth 2 (two) times a day as needed for moderate pain Max Daily Amount: 10 mg   sertraline (ZOLOFT) 50 mg tablet  Self No No   Sig: TAKE 1 TABLET BY MOUTH EVERY DAY      Facility-Administered Medications: None       Past Medical History:   Diagnosis Date   • Anxiety    • Depression    • Fatty liver    • Hyperlipidemia    • Hypertension    • Psychiatric disorder    • Varicella        Past Surgical History:   Procedure Laterality Date   • BREAST SURGERY Bilateral     Reduction Procedure   • CARDIAC SURGERY     •  SECTION      x4   • DILATION AND CURETTAGE OF UTERUS     • ECTOPIC PREGNANCY SURGERY         Family History   Problem Relation Age of Onset   • Lung cancer Mother    • Cirrhosis Father    • Hypertension Sister    • Bipolar disorder Sister    • Heart disease Sister    • Diabetes Family    • Heart disease Family    • Hypertension Family    • Stroke Family    • Thyroid disease Family      I have reviewed and agree with the history as documented. E-Cigarette/Vaping   • E-Cigarette Use Never User      E-Cigarette/Vaping Substances   • Nicotine No    • THC No    • CBD No    • Flavoring No    • Other No    • Unknown No      Social History     Tobacco Use   • Smoking status: Every Day     Packs/day: 1.00     Types: Cigarettes   • Smokeless tobacco: Never   • Tobacco comments:     2 Black and milds per day   Vaping Use   • Vaping Use: Never used   Substance Use Topics   • Alcohol use: Not Currently   • Drug use: Yes     Types: Marijuana     Comment: for pain       Review of Systems   Unable to perform ROS: Acuity of condition       Physical Exam  Physical Exam  Vitals and nursing note reviewed. Constitutional:       General: She is in acute distress. Appearance: She is well-developed. She is obese.       Interventions: She is not intubated. HENT:      Head: Normocephalic and atraumatic. Mouth/Throat:      Pharynx: Pharyngeal swelling present. No oropharyngeal exudate. Comments: There is extensive edema of the soft palate hard palate and swollen tongue. Cannot visualize the oropharynx. Cannot visualize the uvula there is marked edema. The lips are not swollen. Neck:      Thyroid: No thyromegaly. Vascular: No hepatojugular reflux or JVD. Cardiovascular:      Rate and Rhythm: Normal rate and regular rhythm. No extrasystoles are present. Pulses: No decreased pulses. Heart sounds: Normal heart sounds. No murmur heard. No friction rub. No gallop. Pulmonary:      Effort: Pulmonary effort is normal. No tachypnea, accessory muscle usage or respiratory distress. She is not intubated. Breath sounds: Examination of the right-lower field reveals rhonchi. Examination of the left-lower field reveals rhonchi. Rhonchi present. No decreased breath sounds, wheezing or rales. Chest:      Chest wall: No mass, deformity, tenderness, crepitus or edema. There is no dullness to percussion. Abdominal:      General: Bowel sounds are normal.      Palpations: Abdomen is soft. There is no hepatomegaly, splenomegaly or mass. Tenderness: There is no abdominal tenderness. There is no guarding or rebound. Musculoskeletal:         General: No tenderness or deformity. Normal range of motion. Cervical back: Normal range of motion and neck supple. Right lower leg: No tenderness. No edema. Left lower leg: No tenderness. No edema. Lymphadenopathy:      Cervical: No cervical adenopathy. Skin:     General: Skin is warm and dry. Capillary Refill: Capillary refill takes less than 2 seconds. Coloration: Skin is not cyanotic or pale. Findings: No ecchymosis, erythema or rash. Nails: There is no clubbing. Neurological:      Mental Status: She is alert and oriented to person, place, and time. Psychiatric:         Behavior: Behavior normal.         Vital Signs  ED Triage Vitals   Temperature Pulse Respirations Blood Pressure SpO2   09/13/23 1135 09/13/23 1132 09/13/23 1133 09/13/23 1134 09/13/23 1132   98.4 °F (36.9 °C) 85 (!) 28 141/82 (!) 83 %      Temp src Heart Rate Source Patient Position - Orthostatic VS BP Location FiO2 (%)   -- 09/13/23 1133 -- -- --    Monitor         Pain Score       --                  Vitals:    09/13/23 1412 09/13/23 1431 09/13/23 1443 09/13/23 1454   BP: 116/65 112/65 112/68 121/65   Pulse: 76 80 83 80         Visual Acuity      ED Medications  Medications   dexamethasone (PF) (DECADRON) injection 10 mg (10 mg Intravenous Given 9/13/23 1159)   diphenhydrAMINE (BENADRYL) injection 25 mg (25 mg Intravenous Given 9/13/23 1159)   etomidate (AMIDATE) 2 mg/mL injection 20 mg (20 mg Intravenous Given 9/13/23 1237)   Succinylcholine Chloride 100 mg/5 mL syringe 112 mg (112 mg Intravenous Given 9/13/23 1239)   cefTRIAXone (ROCEPHIN) IVPB (premix in dextrose) 1,000 mg 50 mL (0 mg Intravenous Stopped 9/13/23 1431)   doxycycline (VIBRAMYCIN) 100 mg in sodium chloride 0.9 % 100 mL IVPB (100 mg Intravenous New Bag 9/13/23 1432)   iohexol (OMNIPAQUE) 350 MG/ML injection (SINGLE-DOSE) 85 mL (85 mL Intravenous Given 9/13/23 1338)       Diagnostic Studies  Results Reviewed     Procedure Component Value Units Date/Time    POCT Blood Gas (CG8+) [[id number]]  (Abnormal) Collected: 09/13/23 1654    Lab Status: Final result Specimen: Venous Updated: 09/13/23 1658     ph, Donald ISTAT 7.346     pCO2, Donald i-STAT 37.7 mm HG      pO2, Donald i-STAT 70.0 mm HG      BE, i-STAT -5 mmol/L      HCO3, Donald i-STAT 20.6 mmol/L      CO2, i-STAT 22 mmol/L      O2 Sat, i-STAT 93 %      SODIUM, I-STAT 137 mmol/l      Potassium, i-STAT 4.3 mmol/L      Calcium, Ionized i-STAT 1.23 mmol/L      Hct, i-STAT 43 %      Hgb, i-STAT 14.6 g/dl      Glucose, i-STAT 119 mg/dl      POC FIO2 50 L      Specimen Type VENOUS    Blood culture #1 [[id number]] Collected: 09/13/23 1158    Lab Status: Preliminary result Specimen: Blood from Arm, Right Updated: 09/13/23 1502     Blood Culture Received in Microbiology Lab. Culture in Progress. Blood culture #2 [[id number]] Collected: 09/13/23 1158    Lab Status: Preliminary result Specimen: Blood from Arm, Left Updated: 09/13/23 1502     Blood Culture Received in Microbiology Lab. Culture in Progress.     Urine Microscopic [[id number]]  (Abnormal) Collected: 09/13/23 1357    Lab Status: Final result Specimen: Urine, Indwelling Slater Catheter Updated: 09/13/23 1418     RBC, UA 0-5 /hpf      WBC, UA 4-10 /hpf      Epithelial Cells Occasional /hpf      Bacteria, UA Occasional /hpf      MUCUS THREADS Occasional    UA w Reflex to Microscopic w Reflex to Culture [[id number]]  (Abnormal) Collected: 09/13/23 1357    Lab Status: Final result Specimen: Urine, Indwelling Slater Catheter Updated: 09/13/23 1403     Color, UA Yellow     Clarity, UA Clear     Specific Gravity, UA >=1.030     pH, UA 5.5     Leukocytes, UA Negative     Nitrite, UA Negative     Protein, UA 3+ mg/dl      Glucose, UA Negative mg/dl      Ketones, UA Negative mg/dl      Urobilinogen, UA 0.2 E.U./dl      Bilirubin, UA 1+     Occult Blood, UA Trace-Intact    Blood gas, arterial [[id number]]  (Abnormal) Collected: 09/13/23 1345    Lab Status: Final result Specimen: Blood, Arterial from Radial, Left Updated: 09/13/23 1355     pH, Arterial 7.301     pCO2, Arterial 48.9 mm Hg      pO2, Arterial 142.8 mm Hg      HCO3, Arterial 23.6 mmol/L      Base Excess, Arterial -3.4 mmol/L      O2 Content, Arterial 24.9 mL/dL      O2 HGB,Arterial  94.9 %      SOURCE Radial, Left     GENO TEST Yes     AC Rate 12     Tidal Volume 450 ml      Inspired Air (FIO2) 100     PEEP 6    B-Type Natriuretic Peptide(BNP) [[id number]]  (Abnormal) Collected: 09/13/23 1158    Lab Status: Final result Specimen: Blood from Arm, Right Updated: 09/13/23 1305      pg/mL Comprehensive metabolic panel [[id number]]  (Abnormal) Collected: 09/13/23 1158    Lab Status: Final result Specimen: Blood from Arm, Right Updated: 09/13/23 1234     Sodium 139 mmol/L      Potassium 4.0 mmol/L      Chloride 103 mmol/L      CO2 26 mmol/L      ANION GAP 10 mmol/L      BUN 30 mg/dL      Creatinine 1.63 mg/dL      Glucose 91 mg/dL      Calcium 9.7 mg/dL      AST 16 U/L      ALT 14 U/L      Alkaline Phosphatase 59 U/L      Total Protein 7.7 g/dL      Albumin 3.6 g/dL      Total Bilirubin 0.54 mg/dL      eGFR 31 ml/min/1.73sq m     Narrative:      National Kidney Disease Foundation guidelines for Chronic Kidney Disease (CKD):   •  Stage 1 with normal or high GFR (GFR > 90 mL/min/1.73 square meters)  •  Stage 2 Mild CKD (GFR = 60-89 mL/min/1.73 square meters)  •  Stage 3A Moderate CKD (GFR = 45-59 mL/min/1.73 square meters)  •  Stage 3B Moderate CKD (GFR = 30-44 mL/min/1.73 square meters)  •  Stage 4 Severe CKD (GFR = 15-29 mL/min/1.73 square meters)  •  Stage 5 End Stage CKD (GFR <15 mL/min/1.73 square meters)  Note: GFR calculation is accurate only with a steady state creatinine    Protime-INR [[id number]]  (Normal) Collected: 09/13/23 1158    Lab Status: Final result Specimen: Blood from Arm, Right Updated: 09/13/23 1228     Protime 12.7 seconds      INR 0.92    CBC and differential [[id number]]  (Abnormal) Collected: 09/13/23 1158    Lab Status: Final result Specimen: Blood from Arm, Right Updated: 09/13/23 1219     WBC 8.44 Thousand/uL      RBC 4.96 Million/uL      Hemoglobin 15.6 g/dL      Hematocrit 48.7 %      MCV 98 fL      MCH 31.5 pg      MCHC 32.0 g/dL      RDW 15.9 %      MPV 9.7 fL      Platelets 539 Thousands/uL      nRBC 0 /100 WBCs      Neutrophils Relative 72 %      Immat GRANS % 1 %      Lymphocytes Relative 15 %      Monocytes Relative 8 %      Eosinophils Relative 3 %      Basophils Relative 1 %      Neutrophils Absolute 6.19 Thousands/µL      Immature Grans Absolute 0.04 Thousand/uL Lymphocytes Absolute 1.28 Thousands/µL      Monocytes Absolute 0.63 Thousand/µL      Eosinophils Absolute 0.22 Thousand/µL      Basophils Absolute 0.08 Thousands/µL                  XR chest 1 view portable   Final Result by Alfredo Navarrete DO (09/13 6293)      1. Endotracheal tube terminates 3.3 cm above the sujata. 2. Bibasilar airspace opacities are again demonstrated. Workstation performed: [id number]         CTA ED chest PE Study   Final Result by Lakeisha Dowell MD (09/13 140)      No pulmonary embolus. Patchy consolidation right middle lobe, new from 8/25/2023, compatible with pneumonia. Slight regression of previous hilar and mediastinal lymphadenopathy. Improving left upper lobe opacity which may have been due to pneumonia with residual atelectasis/scar. Persistent partial atelectasis of the lower lobes. 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 Workstation performed: [id number]         XR chest 1 view portable   ED Interpretation by Ron Stone MD (09/13 3151)   Postintubation x-ray; ET tube above the sujata by about 4 cm Above sujata will push the tube down.,  Loss of volume of the left lower lobe. Patchy infiltrate on the right lower lobe      Final Result by Alfredo Navarrete DO (09/13 3971)      1. Endotracheal tube terminates 4 cm above the sujata. 2. Redemonstration of bibasilar airspace opacities. Workstation performed: [id number]         XR chest portable   ED Interpretation by Ron Stone MD (09/13 1303)   CXR; LOSS of volume LLL, Patchy infiltrate RLL. Final Result by Alfredo Navarrete DO (09/13 1820)      1. Hazy bibasilar airspace opacities which may represent atelectasis versus pneumonia.  Left basilar opacity is similar to recent radiograph while right basilar opacity is new         Workstation performed: [id number] Procedures  ECG 12 Lead Documentation Only    Date/Time: 9/13/2023 12:08 PM    Performed by: Annette Díaz MD  Authorized by: Annette Díaz MD    Indications / Diagnosis:  SOB  Patient location:  ED  Interpretation:     Interpretation: abnormal    Rate:     ECG rate:  80    ECG rate assessment: normal    Rhythm:     Rhythm: sinus rhythm    QRS:     QRS axis:  Left  Conduction:     Conduction: normal    ST segments:     ST segments:  Normal  T waves:     T waves: normal    Other findings:     Other findings: BARBARA and LVH    Intubation    Date/Time: 9/13/2023 1:00 PM    Performed by: Annette Díaz MD  Authorized by: Annette Díaz MD    Patient location:  ED and bedside  Consent:     Consent obtained:  Verbal    Consent given by:  Patient and guardian    Risks discussed:  Aspiration, brain injury, dental trauma, laryngeal injury, pneumothorax, hypoxia, death and bleeding    Alternatives discussed:  No treatment and delayed treatment  Universal protocol:     Procedure explained and questions answered to patient or proxy's satisfaction: yes      Relevant documents present and verified: yes      Test results available and properly labeled: yes      Radiology Images displayed and confirmed.   If images not available, report reviewed: yes      Required blood products, implants, devices, and special equipment available: yes      Site/side marked: yes      Immediately prior to procedure, a time out was called: yes      Patient identity confirmed:  Verbally with patient, arm band, hospital-assigned identification number, anonymous protocol, patient vented/unresponsive and provided demographic data  Pre-procedure details:     Patient status:  Awake    Mallampati score:  4    Pretreatment medications:  Etomidate    Paralytics:  Succinylcholine  Indications:     Indications for intubation: hypoxemia and other (comment)      Indications for intubation comment:  Angioedema  Procedure details:     Preoxygenation:  Bag valve mask    CPR in progress: no      Intubation method:  Oral    Oral intubation technique:  Glidescope    Laryngoscope blade: Mac 3    Tube size (mm):  7.0    Tube type:  Cuffed    Number of attempts:  1    Ventilation between attempts: no      Cricoid pressure: no      Tube visualized through cords: yes    Placement assessment:     ETT to lip:  21    Tube secured with: Adhesive tape    Breath sounds:  Equal    Placement verification: chest rise, CXR verification, equal breath sounds and ETCO2 detector      CXR findings:  ETT in proper place  Post-procedure details:     Patient tolerance of procedure: Tolerated well, no immediate complications  CriticalCare Time    Date/Time: 9/13/2023 2:08 PM    Performed by: Marcy Thompson MD  Authorized by: Marcy Thompson MD    Critical care provider statement:     Critical care time (minutes):  60    Critical care start time:  9/13/2023 1:25 PM    Critical care end time:  9/13/2023 2:09 PM    Critical care was necessary to treat or prevent imminent or life-threatening deterioration of the following conditions:  Respiratory failure (pneumnia- angioedema)    Critical care was time spent personally by me on the following activities:  Blood draw for specimens, obtaining history from patient or surrogate, development of treatment plan with patient or surrogate, discussions with consultants, discussions with primary provider, evaluation of patient's response to treatment, examination of patient, ordering and performing treatments and interventions, ordering and review of laboratory studies, ordering and review of radiographic studies, re-evaluation of patient's condition, review of old charts and ventilator management             ED Course  ED Course as of 09/13/23 2027   Wed Sep 13, 2023   1302 Patient has signed the refusal form to be intubated after very long discussion with her.   The daughter of trying to convince her to have to agree for it patient insists on refusing. 1302 After patient signing the refusal form I talked to him again and told her that she cannot go to cardiac arrest because of hypoxemia and eventually she agreed for intubation. 1303 Patient has severe angioedema and intubated for protection of the airway. SBIRT 22yo+    Flowsheet Row Most Recent Value   Initial Alcohol Screen: US AUDIT-C     1. How often do you have a drink containing alcohol? 1 Filed at: 09/13/2023 1132   2. How many drinks containing alcohol do you have on a typical day you are drinking? 0 Filed at: 09/13/2023 1132   3a. Male UNDER 65: How often do you have five or more drinks on one occasion? 0 Filed at: 09/13/2023 1132   3b. FEMALE Any Age, or MALE 65+: How often do you have 4 or more drinks on one occassion? 0 Filed at: 09/13/2023 1132   Audit-C Score 1 Filed at: 09/13/2023 1132   MARK: How many times in the past year have you. .. Used an illegal drug or used a prescription medication for non-medical reasons? Never Filed at: 09/13/2023 1132                    Medical Decision Making  This is a 69years old came for having respiratory distress. Has pulse ox 83% on room air. Patient came with angioedema of the tongue soft and  hard palate  and oropharynx. We cannot see uvula and the oropharynx is completely closed. Patient also having productive cough of whitish sputum. Patient is on lisinopril and when she was discharged from [facility] patient kept on lisinopril. Was admitted to ICU at [facility] for having pneumonia, respiratory failure, possible PE. Patient was on antibiotic and finish it. Patient was discharged with no oxygen and no anticoagulants.   At the ER patient refused to be intubated I spent did almost more than 20 minutes with the patient tried to convince her that she needed intubation otherwise she is going to go to and hypoxia which would lead to cardiac arrest patient keeps insisting and refusing and she signed a refusal form. The patient family including his daughter was here to try to convince her and after long discussion patient agreed. Patient was intubated to protect her airway. Patient was put on propofol for sedation. CXR shows patchy infiltrate on the right lower lobe the tube in place. Labs reviewed including CBC CMP and is acceptable. Patient started on Rocephin and doxycycline. The case discussed extensively with the family. Case discussed with the critical care at [facility] Dr. Lito Sheikh and he agreed for transfer and patient to [facility]. Patient received  Benadryl- Decadron and I have ordered FFP. Amount and/or Complexity of Data Reviewed  Labs: ordered. Details: Labs including CBC-CMP are acceptable. Radiology: ordered and independent interpretation performed. Details: CXR shows patchy infiltrate of the right lower lobe, loss of the volume on the left lower lobe. CTA shows no PE, patchy infiltrates at RLL   ECG/medicine tests: ordered. Details: EKG NSR, left axis deviation, right atrial enlargement and LVH. Risk  Prescription drug management. Disposition  Final diagnoses:   Angioedema, initial encounter   Acute respiratory failure with hypoxia ([address])     Time reflects when diagnosis was documented in both MDM as applicable and the Disposition within this note     Time User Action Codes Description Comment    9/13/2023  1:09 PM Geo Melvin. 3XXA] Angioedema, initial encounter     9/13/2023  1:10 PM Vern Ortiz Add [J96.01] Acute respiratory failure with hypoxia [facility])       ED Disposition     ED Disposition   Transfer to Another Facility-In Network    Condition   --    Date/Time   Wed Sep 13, 2023  2:10 PM    Comment   Jeanette Cheng should be transferred out to [address] [place] [address] Most Recent Value   Patient Condition The patient has been stabilized such that within reasonable medical probability, no material deterioration of the patient condition or the condition of the unborn child(danilo) is likely to result from the transfer   Reason for Transfer Level of Care needed not available at this facility   Benefits of Transfer Specialized equipment and/or services available at the receiving facility (Include comment)________________________, Continuity of care   Risks of Transfer Potential for delay in receiving treatment, Potential deterioration of medical condition, Loss of IV, Increased discomfort during transfer, Possible worsening of condition or death during transfer   Accepting Physician Pamela De Paz MD [facility]   Provider Certification General risk, such as traffic hazards, adverse weather conditions, rough terrain or turbulence, possible failure of equipment (including vehicle or aircraft), or consequences of actions of persons outside the control of the transport personnel, Risk of worsening condition, The possibility of a transport vehicle being unavailable, Unanticipated needs of medical equipment and personnel during transport      RN Documentation    Flowsheet Row Most [facility] Pamela Mix      Follow-up Information    None         Discharge Medication List as of 9/13/2023  3:12 PM      CONTINUE these medications which have NOT CHANGED    Details   acetaminophen (TYLENOL) 650 mg CR tablet Take 1 tablet (650 mg total) by mouth every 8 (eight) hours as needed for moderate pain, Starting Fri 8/20/2021, Normal      albuterol (PROVENTIL HFA,VENTOLIN HFA) 90 mcg/act inhaler Inhale 2 puffs every 6 (six) hours as needed for wheezing or shortness of breath, Starting Sat 6/24/2023, Normal      amLODIPine (NORVASC) 10 mg tablet TAKE 1 TABLET BY MOUTH EVERY DAY, Normal      ASPIRIN-ACETAMINOPHEN-CAFFEINE PO Take by mouth, Historical Med      busPIRone (BUSPAR) 30 MG tablet Take 1 tablet (30 mg total) by mouth 3 (three) times a day, Starting Thu 6/22/2023, Until Wed 9/20/2023, Normal      Calcium Carb-Cholecalciferol (OSCAL-D) 500 mg-200 units per tablet Take 1 tablet by mouth 2 (two) times a day with meals, Historical Med      carvedilol (COREG) 25 mg tablet TAKE 1 TABLET BY MOUTH TWICE A DAY WITH FOOD, Normal      diclofenac sodium (VOLTAREN) 1 % APPLY 2 GRAMS TO AFFECTED AREA 4 TIMES A DAY, Normal      EPINEPHrine (EPIPEN) 0.3 mg/0.3 mL SOAJ Inject 0.3 mL (0.3 mg total) into a muscle once for 1 dose, Starting Mon 3/28/2022, Normal      famotidine (PEPCID) 20 mg tablet Take 1 tablet (20 mg total) by mouth 2 (two) times a day, Starting Thu 9/7/2023, Normal      fluticasone (FLONASE) 50 mcg/act nasal spray SPRAY 2 SPRAYS INTO EACH NOSTRIL EVERY DAY, Normal      fluticasone-umeclidinium-vilanterol (Trelegy Ellipta) 100-62.5-25 mcg/actuation inhaler Inhale 1 puff daily Rinse mouth after use., Starting Wed 8/30/2023, Until Fri 9/29/2023, Normal      gabapentin (NEURONTIN) 600 MG tablet Take 1 tablet (600 mg total) by mouth 3 (three) times a day, Starting Mon 6/12/2023, Normal      lisinopril (ZESTRIL) 5 mg tablet Take 2 tablets (10 mg total) by mouth daily Taking 2 tablets, Starting Wed 8/30/2023, No Print      loratadine (CLARITIN) 10 mg tablet TAKE 1 TABLET BY MOUTH EVERY DAY, Starting Tue 9/5/2023, Normal      miconazole (MONISTAT-7) 2 % vaginal cream Insert 1 applicator into the vagina daily at bedtime, Starting Wed 8/30/2023, Normal      naloxone (NARCAN) 4 mg/0.1 mL nasal spray Administer 1 spray into a nostril.  If no response after 2-3 minutes, give another dose in the other nostril using a new spray., Normal      nicotine (NICODERM CQ) 7 mg/24hr TD 24 hr patch Place 1 patch on the skin over 24 hours every 24 hours, Starting Thu 9/7/2023, Normal      NON FORMULARY Hempvana roll on cream for pain, Historical Med      oxyCODONE (OXY-IR) 5 MG capsule Take 1 capsule (5 mg total) by mouth 2 (two) times a day as needed for moderate pain Max Daily Amount: 10 mg, Starting Thu 9/7/2023, Normal      sertraline (ZOLOFT) 50 mg tablet TAKE 1 TABLET BY MOUTH EVERY DAY, Normal             No discharge procedures on file.     PDMP Review       Value Time User    PDMP Reviewed  Yes 9/7/2023 11:22 AM Dulce Ferrara MD          ED Provider  Electronically Signed by           Billie Love MD  09/13/23 2027

## 2023-09-13 NOTE — ASSESSMENT & PLAN NOTE
· 9/13 CT PE study: Patchy consolidation right middle lobe, new from 8/25/2023, compatible with pneumonia. · No leukocytosis, no fever/chills. · Positive for sore throat, cough. · Recent hospitalization for pneumonia noted. · New onset pneumonia after recent hospitalization and antibiotics treatment. Plan:  · Start Zosyn and vancomycin for broad coverage-suspected HAP. · Follow-up blood culture. · Sputum culture if extubated. · MRSA swab.

## 2023-09-13 NOTE — ASSESSMENT & PLAN NOTE
Wt Readings from Last 3 Encounters:   09/07/23 74.6 kg (164 lb 6.4 oz)   08/30/23 73.3 kg (161 lb 9.6 oz)   08/11/23 74.8 kg (165 lb)     Lab Results   Component Value Date    LVEF 60 08/28/2023     (H) 09/13/2023     (H) 08/25/2023     · Volume status:  · POA physical: JVD, HJR, LE edema. · Echo 8/28/23: LVEF 60%. D1DD.     Plan:  • GDMT: Diuretic: **, B-Blocker: **, ACE/ARB/ARNi: **  • Sodium restriction 2g  · CMP, magnesium tomorrow a.m; Goal Mg > 2 and K > 4; Replete prn  · HOB > 30°, Daily standing weights, Measure I/O

## 2023-09-13 NOTE — ASSESSMENT & PLAN NOTE
Lab Results   Component Value Date    CHOLESTEROL 203 (H) 01/24/2023    CHOLESTEROL 177 08/11/2022    CHOLESTEROL 184 07/08/2020     Lab Results   Component Value Date    HDL 45 (L) 01/24/2023    HDL 37 (L) 08/11/2022    HDL 44 (L) 07/08/2020     Lab Results   Component Value Date    TRIG 160 (H) 01/24/2023    TRIG 108 08/11/2022    TRIG 113 07/08/2020     Lab Results   Component Value Date    NONHDLC 146 07/12/2018    [facility] 207 (H) 04/29/2013     · Continue home meds. · Encourage lifestyle change.

## 2023-09-13 NOTE — ASSESSMENT & PLAN NOTE
· POA with acute respiratory failure, SOB. · On physical in [place] ([place] ED: Swollen tongue, swelling soft and hard palate and almost the head of all phalanx is completely closed. Severe angioedema. · Decadron 10 mg, Benadryl 25 mg given. · Initially refused but eventually agreed with intubation. · Prior episode of angioedema in 2020 noted. Suspected coadministration of increase the dose of tramadol and lisinopril. · Etiology:**    Plan:  · Continue intubation. SBT tomorrow. · Start**  · TXA? · Immunology/allergy follow-up as outpatient.

## 2023-09-13 NOTE — PLAN OF CARE
Problem: MOBILITY - ADULT  Goal: Maintain or return to baseline ADL function  Description: INTERVENTIONS:  -  Assess patient's ability to carry out ADLs; assess patient's baseline for ADL function and identify physical deficits which impact ability to perform ADLs (bathing, care of mouth/teeth, toileting, grooming, dressing, etc.)  - Assess/evaluate cause of self-care deficits   - Assess range of motion  - Assess patient's mobility; develop plan if impaired  - Assess patient's need for assistive devices and provide as appropriate  - Encourage maximum independence but intervene and supervise when necessary  - Involve family in performance of ADLs  - Assess for home care needs following discharge   - Consider OT consult to assist with ADL evaluation and planning for discharge  - Provide patient education as appropriate  Outcome: Progressing  Goal: Maintains/Returns to pre admission functional level  Description: INTERVENTIONS:  - Perform BMAT or MOVE assessment daily.   - Set and communicate daily mobility goal to care team and patient/family/caregiver.    - Collaborate with rehabilitation services on mobility goals if consulted  - Out of bed for toileting  - Record patient progress and toleration of activity level   Outcome: Progressing     Problem: Prexisting or High Potential for Compromised Skin Integrity  Goal: Skin integrity is maintained or improved  Description: INTERVENTIONS:  - Identify patients at risk for skin breakdown  - Assess and monitor skin integrity  - Assess and monitor nutrition and hydration status  - Monitor labs   - Assess for incontinence   - Turn and reposition patient  - Assist with mobility/ambulation  - Relieve pressure over bony prominences  - Avoid friction and shearing  - Provide appropriate hygiene as needed including keeping skin clean and dry  - Evaluate need for skin moisturizer/barrier cream  - Collaborate with interdisciplinary team   - Patient/family teaching  - Consider wound care consult   Outcome: Progressing     Problem: SAFETY,RESTRAINT: NV/NON-SELF DESTRUCTIVE BEHAVIOR  Goal: Remains free of harm/injury (restraint for non violent/non self-detsructive behavior)  Description: INTERVENTIONS:  - Instruct patient/family regarding restraint use   - Assess and monitor physiologic and psychological status   - Provide interventions and comfort measures to meet assessed patient needs   - Identify and implement measures to help patient regain control  - Assess readiness for release of restraint   Outcome: Progressing  Goal: Returns to optimal restraint-free functioning  Description: INTERVENTIONS:  - Assess the patient's behavior and symptoms that indicate continued need for restraint  - Identify and implement measures to help patient regain control  - Assess readiness for release of restraint   Outcome: Progressing

## 2023-09-13 NOTE — ED NOTES
Dr. Arslan Johnson at bedside, pt refusing to be intubated.       Elidia Armstrong RN  09/13/23 1210       Elidia Armstrong RN  09/13/23 1175

## 2023-09-13 NOTE — PROGRESS NOTES
The patient’s standard-infusion Piperacillin-Tazobactam / Zosyn (infused over 30-60 minutes) has been converted to extended-infusion (infused over 4 hours) per [facility] Extended-Infusion Piperacillin-Tazobactam Protocol for Adults as approved by the Pharmacy and Therapeutics Committee (accessible here on MyNET).      The patient met ALL eligible criteria:    Age >= 25years old   Critical Care patient    And did NOT have ANY exclusions:     Emergency Department or Operating Room patient  Drug incompatibilities that could NOT be avoided with timing or separate line administration    The following are reminders for Nursing regarding administration:  Infuse the first dose of Zosyn over 30min as a load (if new start), and then all subsequent doses will be given as an extended-infusion over 4 hours (see dosing below)  Use primary tubing as an intermittent infusion; change out primary tubing every 24 hours   Ensure full dose of the medication is given at the appropriate rate  Most incompatible drugs can be scheduled during times when the Zosyn is not being infused; however, if one requires administration during the same time, a separate site or lumen MUST be used  If access is limited and an incompatible medication urgently needs to be given, the Zosyn extended-infusion can be held for up to 30min (remember to flush line before/after)  Extended-infusion Zosyn does NOT require special timing around hemodialysis (it can even be given simultaneously)  If a patient needs an urgent MRI while Zosyn is infusing and there is not a MRI-compatible pump available for use, finish the infusion over the traditional length (30min) and ask Pharmacy to reschedule the next doses so that they start a few hours earlier  Pharmacy will assist nursing in troubleshooting other administration issues as they arise  Dosing for Piperacillin-Tazobactam  CrCl (mL/min) Traditional Dosing Extended-Infusion Dosing #   CrCl > 40 High-Dose  CrCl > 40 Low-Dose 4.5g Q6H (over 30min)  3.375g IV Q6H (over 30min) 3.375g IV Q8H (over 4hr)*    *1st dose loaded over 30min, then start extended-infusion dosing 4hr later   CrCl 20-40 High-Dose  CrCl 20-40 Low-Dose 3.375g IV Q6H (over 30min)  2.25g IV Q6H (over 30min)    CrCl < 20 High-Dose  CrCl < 20 Low-Dose 2.25g IV Q6H (over 30min)  2.25g IV Q8H (over 30min) 3.375g IV Q12H (over 4hr)*    *1st dose loaded over 30min, then start extended-infusion dosing 6hr later   Hemo/Peritoneal Dialysis High-Dose  Hemo/Peritoneal Dialysis Low-Dose 2.25g IV Q6H (over 30min)  2.25g IV Q8H (over 30min)    CVVH/D High-Dose  CVVH/D Low-Dose 3.375g IV Q6H (over 30min)  2.25 IV Q6H (over 30min) 3.375g IV Q8H (over 4hr)*    *1st dose loaded over 30min, then start extended-infusion dosing 4hr later   # = Use 4.5g dosing (same interval) if morbidly obese (BMI ?40)    Please call the Pharmacy with any questions or concerns.

## 2023-09-13 NOTE — ASSESSMENT & PLAN NOTE
• POA with O2 saturate around 80% on room air. Needed high flow 60 L to bring up her O2 saturation to 95%. • Recent hospitalization for respiratory failure second to pneumonia. • CTA PE study negative for PE. New onset RML PNA. • Persistent partial atelectasis of the lower lobes noted. • Current daily smoker. • Less likely second to acute exacerbation of CHF.     Plan:  • Continue intubation. Scheduled earlier ventilation elaboration tomorrow. • P.o. prednisone. • Continue ICS Trelegy. Xopenex/Atrovent as needed. • Airway clearance protocol.  IS.

## 2023-09-13 NOTE — ASSESSMENT & PLAN NOTE
Well controlled at home. Home meds: Amlodipine 10 mg daily, Coreg 25 mg twice daily, lisinopril 10 mg daily. In ICU: BP 98/52. Will hold amlodipine, Coreg and lisinopril at this moment.

## 2023-09-13 NOTE — ASSESSMENT & PLAN NOTE
Wt Readings from Last 3 Encounters:   09/07/23 74.6 kg (164 lb 6.4 oz)   08/30/23 73.3 kg (161 lb 9.6 oz)   08/11/23 74.8 kg (165 lb)     Lab Results   Component Value Date    LVEF 60 08/28/2023     (H) 09/13/2023     (H) 08/25/2023     • Volume status: Euvolemic. • POA physical (limited): JVD-, HJR-, B/L LE trace to 1+ edema. • Echo 8/28/23: LVEF 60%. D1DD.   • Likely not acute exacerbation.     Plan:  • CMP, magnesium tomorrow a.m; Goal Mg > 2 and K > 4; Replete prn  • HOB > 30°, Daily standing weights, Measure I/O

## 2023-09-13 NOTE — RESPIRATORY THERAPY NOTE
Resp care   09/13/23 1256   Respiratory Assessment   Assessment Type Assess only   General Appearance Sedated   Respiratory Pattern Assisted   Chest Assessment Chest expansion symmetrical   Bilateral Breath Sounds Coarse   Resp Comments Pt intubated with 7.0 oett located at 21 cm at lip by ERdoctor via glide scope. Positive color change on easy cap, B/S equal and bilat, cxr pending. Pt placed on A/C with settings per flow sheet. Plan is transfer to [facility]. ABG pending. Vent Information   Vent ID [id number]   Vent type     Vent Mode AC/VC   $ Vent Charge-INITIAL Yes   Ventilator Start Yes   Is the patient reintubated? No   $ Vent Daily Charge-Subsequent Yes   $ Vital Capacity Mech/Peak Flow Yes   $ Pulse Oximetry Spot Check Charge Completed   SpO2 98 %   AC/VC Settings   Resp Rate (BPM) 12 BPM   Vt (mL) 450 mL   FIO2 (%) 100 %   PEEP (cmH2O) 6 cmH2O   Flow Pattern (LPM) 50 L/min   Trigger Sensitivity Flow (lpm) 2 %   Humidification Heat and moisture exchanger   AC/VC Actuals   Resp Rate (BPM) 12 BPM   VT (mL) 451   MV 5.38   MAP (cmH2O) 9.5 cmH2O   Peak Pressure (cmH2O) 27 cmH2O   I/E Ratio (Obs) 1:2   Plateau Pressure (cm H2O) 14 cm H2O   AC/VC Alarms   High Peak Pressure (cmH2O) 45   High Resp Rate (BPM) 40 BPM   High MV (L/min) 16 L/min   Low MV (L/min) 3 L/min   Vt High (mL) 1000 mL   Vt Low (mL) 200 mL   AC/VC Apnea Settings   Resp Rate (BPM) 12 BPM   VT (mL) 450 mL   FIO2 (%) 100 %   Apnea Time (s) 20 S   Maintenance   Alarm (pink) cable attached No   Resuscitation bag with peep valve at bedside Yes   Water bag changed Yes   Circuit changed Yes   Daily Screen   Patient safety screen outcome: Failed   Not Ready for Weaning due to: Underline problem not resolved   ETT  7 mm   Placement Date/Time: 09/13/23 1242   Preoxygenated: Yes  Technique: Video laryngoscopy  Tube Size: 7 mm  Insertion attempts: 1  Placement Verification: Auscultation; End tidal CO2  Secured at (cm): 21  Placed by (Name):  Angel   Secured at (cm) 21   Measured from [address] by [employer] tube ryan   Site Condition Dry   Cuff Pressure (cm H2O)   (mlt)   HI-LO Suction  Capped

## 2023-09-13 NOTE — EMTALA/ACUTE CARE TRANSFER
[facility] EMERGENCY DEPARTMENT  [address] [place] [address]  Dept: [phone]      EMTALA TRANSFER CONSENT    NAME Kemar HUMPHREY 1955                              MRN [id number]    I have been informed of my rights regarding examination, treatment, and transfer   by Dr. Billie Love MD    Benefits: Specialized equipment and/or services available at the receiving facility (Include comment)________________________, Continuity of care    Risks: Potential for delay in receiving treatment, Potential deterioration of medical condition, Loss of IV, Increased discomfort during transfer, Possible worsening of condition or death during transfer      Transfer Request   I acknowledge that my medical condition has been evaluated and explained to me by the emergency department physician or other qualified medical person and/or my attending physician who has recommended and offered to me further medical examination and treatment. I understand the Hospital's obligation with respect to the treatment and stabilization of my emergency medical condition. I nevertheless request to be transferred. I release the Hospital, the doctor, and any other persons caring for me from all responsibility or liability for any injury or ill effects that may result from my transfer and agree to accept all responsibility for the consequences of my choice to transfer, rather than receive stabilizing treatment at the Hospital. I understand that because the transfer is my request, my insurance may not provide reimbursement for the services. The Hospital will assist and direct me and my family in how to make arrangements for transfer, but the hospital is not liable for any fees charged by the transport service.   In spite of this understanding, I refuse to consent to further medical examination and treatment which has been offered to me, and request transfer to Accepting Facility Name, [facility] : Claudell Mulders. I authorize the performance of emergency medical procedures and treatments upon me in both transit and upon arrival at the receiving facility. Additionally, I authorize the release of any and all medical records to the receiving facility and request they be transported with me, if possible. I authorize the performance of emergency medical procedures and treatments upon me in both transit and upon arrival at the receiving facility. Additionally, I authorize the release of any and all medical records to the receiving facility and request they be transported with me, if possible. I understand that the safest mode of transportation during a medical emergency is an ambulance and that the Hospital advocates the use of this mode of transport. Risks of traveling to the receiving facility by car, including absence of medical control, life sustaining equipment, such as oxygen, and medical personnel has been explained to me and I fully understand them. (SANDEEP CORRECT BOX BELOW)  [  ]  I consent to the stated transfer and to be transported by ambulance/helicopter. [  ]  I consent to the stated transfer, but refuse transportation by ambulance and accept full responsibility for my transportation by car. I understand the risks of non-ambulance transfers and I exonerate the Hospital and its staff from any deterioration in my condition that results from this refusal.    X___________________________________________    DATE  23  TIME________  Signature of patient or legally responsible individual signing on patient behalf           RELATIONSHIP TO PATIENT_________________________          Provider Certification    NAME Daya Harrison                                        [facility] 1955                              MRN [id number]    A medical screening exam was performed on the above named patient.   Based on the examination:    Condition Necessitating Transfer The primary encounter diagnosis was Angioedema, initial encounter. A diagnosis of Acute respiratory failure with hypoxia (HCC) was also pertinent to this visit. Patient Condition: The patient has been stabilized such that within reasonable medical probability, no material deterioration of the patient condition or the condition of the unborn child(danilo) is likely to result from the transfer    Reason for Transfer: Level of Care needed not available at this facility    Transfer Requirements: [address]   · Space available and qualified personnel available for treatment as acknowledged by    · Agreed to accept transfer and to provide appropriate medical treatment as acknowledged by       Dr. Carla Ayala  · Appropriate medical records of the examination and treatment of the patient are provided at the time of transfer   [address] _______  · Transfer will be performed by qualified personnel from    and appropriate transfer equipment as required, including the use of necessary and appropriate life support measures.     Provider Certification: I have examined the patient and explained the following risks and benefits of being transferred/refusing transfer to the patient/family:  General risk, such as traffic hazards, adverse weather conditions, rough terrain or turbulence, possible failure of equipment (including vehicle or aircraft), or consequences of actions of persons outside the control of the transport personnel, Risk of worsening condition, The possibility of a transport vehicle being unavailable, Unanticipated needs of medical equipment and personnel during transport      Based on these reasonable risks and benefits to the patient and/or the unborn child(danilo), and based upon the information available at the time of the patient’s examination, I certify that the medical benefits reasonably to be expected from the provision of appropriate medical treatments at another medical facility outweigh the increasing risks, if any, to the individual’s medical condition, and in the case of labor to the unborn child, from effecting the transfer.     X____________________________________________ DATE 09/13/23        TIME_______      ORIGINAL - SEND TO MEDICAL RECORDS   COPY - SEND WITH PATIENT DURING TRANSFER

## 2023-09-13 NOTE — LETTER
[facility] [address]  Dept: [phone]    December 1, 2023     Patient: Benjy Maloney   YOB: 1955   Date of Visit: 9/13/2023       To [facility]:    Benjy Maloney is under my professional care. She was seen in the hospital from 9/13/2023 to 12/01/23. Ms. Sky Meyer will require 24/7 care. Ms. [place] daughter Cassy Kinsey) will require training on how to take care of her mother's new medical needs. The training extends from December 4th to December 15, 2023. Training includes tracheal suctioning techniques, tracheostomy care, understanding equipment management, wound care etc.     If you have any questions or concerns, please don't hesitate to call.          Sincerely,          Bina Perry MD

## 2023-09-13 NOTE — ASSESSMENT & PLAN NOTE
Lab Results   Component Value Date    CREATININE 1.63 (H) 09/13/2023    CREATININE 1.15 08/30/2023    CREATININE 1.22 08/29/2023       Plan:  • UA w/ microscopic, Urinary retention protocol, Bladder scan, Daily weights, I/O  • IV Fluids: 100 cc/hr. • Monitor BMP daily and observe for downward trend of creatinine  • Avoid hypoperfusion of the kidneys, minimize nephrotoxins  • RAAS Blockers/Diuretics held: Lisinopril.

## 2023-09-13 NOTE — ASSESSMENT & PLAN NOTE
Lab Results   Component Value Date    CHOLESTEROL 203 (H) 01/24/2023    CHOLESTEROL 177 08/11/2022    CHOLESTEROL 184 07/08/2020     Lab Results   Component Value Date    HDL 45 (L) 01/24/2023    HDL 37 (L) 08/11/2022    HDL 44 (L) 07/08/2020     Lab Results   Component Value Date    TRIG 160 (H) 01/24/2023    TRIG 108 08/11/2022    TRIG 113 07/08/2020     Lab Results   Component Value Date    NONHDLC 146 07/12/2018    [facility] 207 (H) 04/29/2013     Continue diet/ lifestyle modification.

## 2023-09-13 NOTE — ASSESSMENT & PLAN NOTE
• POA with acute respiratory failure, SOB. • On physical in [place] ([place] ED: Swollen tongue, swelling soft and hard palate and almost the head of all phalanx is completely closed. Severe angioedema. • Decadron 10 mg, Benadryl 25 mg given. • Initially refused but eventually agreed with intubation. • Prior episode of angioedema in 2020 noted. Suspected coadministration of increase the dose of tramadol and lisinopril. • Per daughter, there is no recent change in medications except Trelegy inhaler. • Etiology: Unclear. ICU physical limited by intubation. Negative for swollen tongue. Intubation cough gas leak test still positive for swollen airway. Plan:  • Continue intubation. SBT tomorrow. • Start Decadron and Benadryl. • Consider immunology/allergy follow-up as outpatient. • CT facial with contrast if creatinine improves.

## 2023-09-13 NOTE — ASSESSMENT & PLAN NOTE
Home regimen:**      Laurie Grijalva is a 69 y.o. female who  has a past medical history of HTN, HLD, Fatty liver, daily smoker, chronic pain syndrome, bipolar disorder initially presented to [facility] ED with shortness of breath starting from yesterday. Her O2 saturation was lower and 80% POA. She has been recently discharged from hospital 3 weeks ago for pneumonia and respiratory failure. She was given high flow 60 L oxygen and helped bring her O2 saturation to 95%. On physical, She was having swollen tongue swelling soft and hard palate and almost the head of all phalanx is completely closed. Severe angioedema. Patient initially refused intubation for field times but eventually agreed for intubation per ER provider. Patient is on lisinopril according to the family members. Prior episode of angioedema noted in 2020, suspected coadministration of increased dose of tramadol with lisinopril. Patient to be transferred from [address] [place] [address] to [facility] ICU stepdown unit.

## 2023-09-13 NOTE — ASSESSMENT & PLAN NOTE
· POA with O2 saturate around 80% on room air. Needed high flow 60 L to bring up her O2 saturation to 95%. · Recent hospitalization for respiratory failure second to pneumonia. · CTA PE study negative for PE. New onset R** PNA. · Volume reduction noted on CT scan as well. · Symptoms:**  · CHF**  · Smoking**    Plan:  · Continue intubation. · P.o. prednisone. · Continue ICS Trelegy. Xopenex/Atrovent as needed. · Airway clearance protocol.  IS.

## 2023-09-14 ENCOUNTER — APPOINTMENT (INPATIENT)
Dept: CT IMAGING | Facility: HOSPITAL | Age: 68
DRG: 004 | End: 2023-09-14
Payer: COMMERCIAL

## 2023-09-14 ENCOUNTER — APPOINTMENT (INPATIENT)
Dept: GASTROENTEROLOGY | Facility: HOSPITAL | Age: 68
DRG: 004 | End: 2023-09-14
Payer: COMMERCIAL

## 2023-09-14 LAB
ALBUMIN SERPL BCP-MCNC: 2.8 G/DL (ref 3.5–5)
ALP SERPL-CCNC: 45 U/L (ref 34–104)
ALT SERPL W P-5'-P-CCNC: 8 U/L (ref 7–52)
ANION GAP SERPL CALCULATED.3IONS-SCNC: 12 MMOL/L
ANISOCYTOSIS BLD QL SMEAR: PRESENT
ARTERIAL PATENCY WRIST A: NO
AST SERPL W P-5'-P-CCNC: 16 U/L (ref 13–39)
ATRIAL RATE: 80 BPM
BASE EX.OXY STD BLDV CALC-SCNC: 81.1 % (ref 60–80)
BASE EXCESS BLDV CALC-SCNC: -7.4 MMOL/L
BASOPHILS # BLD MANUAL: 0 THOUSAND/UL (ref 0–0.1)
BASOPHILS NFR MAR MANUAL: 0 % (ref 0–1)
BILIRUB SERPL-MCNC: 0.34 MG/DL (ref 0.2–1)
BUN SERPL-MCNC: 32 MG/DL (ref 5–25)
BURR CELLS BLD QL SMEAR: PRESENT
CALCIUM ALBUM COR SERPL-MCNC: 9.1 MG/DL (ref 8.3–10.1)
CALCIUM SERPL-MCNC: 8.1 MG/DL (ref 8.4–10.2)
CHLORIDE SERPL-SCNC: 109 MMOL/L (ref 96–108)
CO2 SERPL-SCNC: 16 MMOL/L (ref 21–32)
CREAT SERPL-MCNC: 1.4 MG/DL (ref 0.6–1.3)
DACRYOCYTES BLD QL SMEAR: PRESENT
EOSINOPHIL # BLD MANUAL: 0 THOUSAND/UL (ref 0–0.4)
EOSINOPHIL NFR BLD MANUAL: 0 % (ref 0–6)
ERYTHROCYTE [DISTWIDTH] IN BLOOD BY AUTOMATED COUNT: 15.9 % (ref 11.6–15.1)
GFR SERPL CREATININE-BSD FRML MDRD: 38 ML/MIN/1.73SQ M
GLUCOSE SERPL-MCNC: 118 MG/DL (ref 65–140)
GLUCOSE SERPL-MCNC: 120 MG/DL (ref 65–140)
HCO3 BLDV-SCNC: 18.8 MMOL/L (ref 24–30)
HCT VFR BLD AUTO: 37.1 % (ref 34.8–46.1)
HGB BLD-MCNC: 12.2 G/DL (ref 11.5–15.4)
HOROWITZ INDEX BLDA+IHG-RTO: 50 MM[HG]
LACTATE SERPL-SCNC: 1.1 MMOL/L (ref 0.5–2)
LYMPHOCYTES # BLD AUTO: 0.65 THOUSAND/UL (ref 0.6–4.47)
LYMPHOCYTES # BLD AUTO: 8 % (ref 14–44)
MAGNESIUM SERPL-MCNC: 2.4 MG/DL (ref 1.9–2.7)
MCH RBC QN AUTO: 31.9 PG (ref 26.8–34.3)
MCHC RBC AUTO-ENTMCNC: 32.9 G/DL (ref 31.4–37.4)
MCV RBC AUTO: 97 FL (ref 82–98)
MONOCYTES # BLD AUTO: 0 THOUSAND/UL (ref 0–1.22)
MONOCYTES NFR BLD: 0 % (ref 4–12)
NEUTROPHILS # BLD MANUAL: 7.45 THOUSAND/UL (ref 1.85–7.62)
NEUTS BAND NFR BLD MANUAL: 2 % (ref 0–8)
NEUTS SEG NFR BLD AUTO: 90 % (ref 43–75)
O2 CT BLDV-SCNC: 15.5 ML/DL
P AXIS: 75 DEGREES
PCO2 BLDV: 40.6 MM HG (ref 42–50)
PEEP RESPIRATORY: 6 CM[H2O]
PH BLDV: 7.28 [PH] (ref 7.3–7.4)
PHOSPHATE SERPL-MCNC: 4.3 MG/DL (ref 2.3–4.1)
PLATELET # BLD AUTO: 237 THOUSANDS/UL (ref 149–390)
PLATELET BLD QL SMEAR: ADEQUATE
PMV BLD AUTO: 10.9 FL (ref 8.9–12.7)
PO2 BLDV: 51.6 MM HG (ref 35–45)
POIKILOCYTOSIS BLD QL SMEAR: PRESENT
POTASSIUM SERPL-SCNC: 4.4 MMOL/L (ref 3.5–5.3)
PR INTERVAL: 178 MS
PROCALCITONIN SERPL-MCNC: 0.36 NG/ML
PROT SERPL-MCNC: 6.1 G/DL (ref 6.4–8.4)
QRS AXIS: -49 DEGREES
QRSD INTERVAL: 92 MS
QT INTERVAL: 382 MS
QTC INTERVAL: 440 MS
RBC # BLD AUTO: 3.83 MILLION/UL (ref 3.81–5.12)
RBC MORPH BLD: PRESENT
SMUDGE CELLS BLD QL SMEAR: PRESENT
SODIUM SERPL-SCNC: 137 MMOL/L (ref 135–147)
T WAVE AXIS: 66 DEGREES
VANCOMYCIN SERPL-MCNC: 11 UG/ML (ref 10–20)
VENT AC: 16
VENT- AC: AC
VENTRICULAR RATE: 80 BPM
VT SETTING VENT: 400 ML
WBC # BLD AUTO: 8.1 THOUSAND/UL (ref 4.31–10.16)

## 2023-09-14 PROCEDURE — 94760 N-INVAS EAR/PLS OXIMETRY 1: CPT

## 2023-09-14 PROCEDURE — 84145 PROCALCITONIN (PCT): CPT

## 2023-09-14 PROCEDURE — 99221 1ST HOSP IP/OBS SF/LOW 40: CPT | Performed by: OTOLARYNGOLOGY

## 2023-09-14 PROCEDURE — 87070 CULTURE OTHR SPECIMN AEROBIC: CPT | Performed by: STUDENT IN AN ORGANIZED HEALTH CARE EDUCATION/TRAINING PROGRAM

## 2023-09-14 PROCEDURE — 87205 SMEAR GRAM STAIN: CPT | Performed by: STUDENT IN AN ORGANIZED HEALTH CARE EDUCATION/TRAINING PROGRAM

## 2023-09-14 PROCEDURE — 70491 CT SOFT TISSUE NECK W/DYE: CPT

## 2023-09-14 PROCEDURE — 31624 DX BRONCHOSCOPE/LAVAGE: CPT | Performed by: STUDENT IN AN ORGANIZED HEALTH CARE EDUCATION/TRAINING PROGRAM

## 2023-09-14 PROCEDURE — 87106 FUNGI IDENTIFICATION YEAST: CPT | Performed by: STUDENT IN AN ORGANIZED HEALTH CARE EDUCATION/TRAINING PROGRAM

## 2023-09-14 PROCEDURE — 0B958ZZ DRAINAGE OF RIGHT MIDDLE LOBE BRONCHUS, VIA NATURAL OR ARTIFICIAL OPENING ENDOSCOPIC: ICD-10-PCS | Performed by: STUDENT IN AN ORGANIZED HEALTH CARE EDUCATION/TRAINING PROGRAM

## 2023-09-14 PROCEDURE — 87081 CULTURE SCREEN ONLY: CPT

## 2023-09-14 PROCEDURE — 80053 COMPREHEN METABOLIC PANEL: CPT

## 2023-09-14 PROCEDURE — 93010 ELECTROCARDIOGRAM REPORT: CPT | Performed by: INTERNAL MEDICINE

## 2023-09-14 PROCEDURE — 85027 COMPLETE CBC AUTOMATED: CPT

## 2023-09-14 PROCEDURE — 87281 PNEUMOCYSTIS CARINII AG IF: CPT | Performed by: STUDENT IN AN ORGANIZED HEALTH CARE EDUCATION/TRAINING PROGRAM

## 2023-09-14 PROCEDURE — G1004 CDSM NDSC: HCPCS

## 2023-09-14 PROCEDURE — 88112 CYTOPATH CELL ENHANCE TECH: CPT | Performed by: PATHOLOGY

## 2023-09-14 PROCEDURE — 87015 SPECIMEN INFECT AGNT CONCNTJ: CPT | Performed by: STUDENT IN AN ORGANIZED HEALTH CARE EDUCATION/TRAINING PROGRAM

## 2023-09-14 PROCEDURE — 84100 ASSAY OF PHOSPHORUS: CPT

## 2023-09-14 PROCEDURE — 80202 ASSAY OF VANCOMYCIN: CPT | Performed by: STUDENT IN AN ORGANIZED HEALTH CARE EDUCATION/TRAINING PROGRAM

## 2023-09-14 PROCEDURE — 87102 FUNGUS ISOLATION CULTURE: CPT | Performed by: STUDENT IN AN ORGANIZED HEALTH CARE EDUCATION/TRAINING PROGRAM

## 2023-09-14 PROCEDURE — 87206 SMEAR FLUORESCENT/ACID STAI: CPT | Performed by: STUDENT IN AN ORGANIZED HEALTH CARE EDUCATION/TRAINING PROGRAM

## 2023-09-14 PROCEDURE — 82948 REAGENT STRIP/BLOOD GLUCOSE: CPT

## 2023-09-14 PROCEDURE — 83735 ASSAY OF MAGNESIUM: CPT

## 2023-09-14 PROCEDURE — 94150 VITAL CAPACITY TEST: CPT

## 2023-09-14 PROCEDURE — 82805 BLOOD GASES W/O2 SATURATION: CPT | Performed by: NURSE PRACTITIONER

## 2023-09-14 PROCEDURE — 87116 MYCOBACTERIA CULTURE: CPT | Performed by: STUDENT IN AN ORGANIZED HEALTH CARE EDUCATION/TRAINING PROGRAM

## 2023-09-14 PROCEDURE — 83605 ASSAY OF LACTIC ACID: CPT | Performed by: NURSE PRACTITIONER

## 2023-09-14 PROCEDURE — 85007 BL SMEAR W/DIFF WBC COUNT: CPT

## 2023-09-14 PROCEDURE — 87252 VIRUS INOCULATION TISSUE: CPT | Performed by: STUDENT IN AN ORGANIZED HEALTH CARE EDUCATION/TRAINING PROGRAM

## 2023-09-14 PROCEDURE — 99291 CRITICAL CARE FIRST HOUR: CPT | Performed by: STUDENT IN AN ORGANIZED HEALTH CARE EDUCATION/TRAINING PROGRAM

## 2023-09-14 PROCEDURE — 94003 VENT MGMT INPAT SUBQ DAY: CPT

## 2023-09-14 PROCEDURE — 94640 AIRWAY INHALATION TREATMENT: CPT

## 2023-09-14 PROCEDURE — 88305 TISSUE EXAM BY PATHOLOGIST: CPT | Performed by: PATHOLOGY

## 2023-09-14 RX ORDER — SUCCINYLCHOLINE/SOD CL,ISO/PF 100 MG/5ML
100 SYRINGE (ML) INTRAVENOUS ONCE
Status: COMPLETED | OUTPATIENT
Start: 2023-09-14 | End: 2023-09-14

## 2023-09-14 RX ORDER — FENTANYL CITRATE 50 UG/ML
50 INJECTION, SOLUTION INTRAMUSCULAR; INTRAVENOUS EVERY 2 HOUR PRN
Status: DISCONTINUED | OUTPATIENT
Start: 2023-09-14 | End: 2023-09-26

## 2023-09-14 RX ORDER — IPRATROPIUM BROMIDE AND ALBUTEROL SULFATE 2.5; .5 MG/3ML; MG/3ML
3 SOLUTION RESPIRATORY (INHALATION)
Status: DISCONTINUED | OUTPATIENT
Start: 2023-09-14 | End: 2023-09-16

## 2023-09-14 RX ORDER — FENTANYL CITRATE 50 UG/ML
INJECTION, SOLUTION INTRAMUSCULAR; INTRAVENOUS
Status: COMPLETED
Start: 2023-09-14 | End: 2023-09-14

## 2023-09-14 RX ORDER — MIDAZOLAM HYDROCHLORIDE 2 MG/2ML
INJECTION, SOLUTION INTRAMUSCULAR; INTRAVENOUS
Status: COMPLETED
Start: 2023-09-14 | End: 2023-09-14

## 2023-09-14 RX ORDER — MIDAZOLAM HYDROCHLORIDE 2 MG/2ML
2 INJECTION, SOLUTION INTRAMUSCULAR; INTRAVENOUS ONCE
Status: COMPLETED | OUTPATIENT
Start: 2023-09-14 | End: 2023-09-14

## 2023-09-14 RX ORDER — FENTANYL CITRATE 50 UG/ML
100 INJECTION, SOLUTION INTRAMUSCULAR; INTRAVENOUS ONCE
Status: COMPLETED | OUTPATIENT
Start: 2023-09-14 | End: 2023-09-14

## 2023-09-14 RX ORDER — VANCOMYCIN HYDROCHLORIDE 1 G/200ML
15 INJECTION, SOLUTION INTRAVENOUS ONCE
Status: COMPLETED | OUTPATIENT
Start: 2023-09-14 | End: 2023-09-14

## 2023-09-14 RX ADMIN — Medication 150 MCG/HR: at 15:31

## 2023-09-14 RX ADMIN — BUSPIRONE HYDROCHLORIDE 30 MG: 10 TABLET ORAL at 08:49

## 2023-09-14 RX ADMIN — BUSPIRONE HYDROCHLORIDE 30 MG: 10 TABLET ORAL at 16:15

## 2023-09-14 RX ADMIN — DIPHENHYDRAMINE HYDROCHLORIDE 25 MG: 50 INJECTION, SOLUTION INTRAMUSCULAR; INTRAVENOUS at 01:00

## 2023-09-14 RX ADMIN — FENTANYL CITRATE 50 MCG: 50 INJECTION INTRAMUSCULAR; INTRAVENOUS at 20:13

## 2023-09-14 RX ADMIN — Medication 100 MG: at 12:16

## 2023-09-14 RX ADMIN — FENTANYL CITRATE 50 MCG: 50 INJECTION INTRAMUSCULAR; INTRAVENOUS at 01:35

## 2023-09-14 RX ADMIN — DEXAMETHASONE SODIUM PHOSPHATE 8 MG: 4 INJECTION INTRA-ARTICULAR; INTRALESIONAL; INTRAMUSCULAR; INTRAVENOUS; SOFT TISSUE at 05:28

## 2023-09-14 RX ADMIN — NYSTATIN 500000 UNITS: 100000 SUSPENSION ORAL at 17:27

## 2023-09-14 RX ADMIN — DEXAMETHASONE SODIUM PHOSPHATE 8 MG: 4 INJECTION INTRA-ARTICULAR; INTRALESIONAL; INTRAMUSCULAR; INTRAVENOUS; SOFT TISSUE at 13:53

## 2023-09-14 RX ADMIN — MIDAZOLAM HYDROCHLORIDE 2 MG: 1 INJECTION, SOLUTION INTRAMUSCULAR; INTRAVENOUS at 11:53

## 2023-09-14 RX ADMIN — NYSTATIN 500000 UNITS: 100000 SUSPENSION ORAL at 13:53

## 2023-09-14 RX ADMIN — BUSPIRONE HYDROCHLORIDE 30 MG: 10 TABLET ORAL at 21:57

## 2023-09-14 RX ADMIN — FENTANYL CITRATE 50 MCG: 50 INJECTION INTRAMUSCULAR; INTRAVENOUS at 23:15

## 2023-09-14 RX ADMIN — MIDAZOLAM 2 MG: 1 INJECTION INTRAMUSCULAR; INTRAVENOUS at 12:16

## 2023-09-14 RX ADMIN — PROPOFOL 40 MCG/KG/MIN: 10 INJECTION, EMULSION INTRAVENOUS at 12:17

## 2023-09-14 RX ADMIN — VANCOMYCIN HYDROCHLORIDE 1000 MG: 1 INJECTION, SOLUTION INTRAVENOUS at 10:26

## 2023-09-14 RX ADMIN — SERTRALINE HYDROCHLORIDE 50 MG: 50 TABLET ORAL at 08:48

## 2023-09-14 RX ADMIN — Medication 150 MCG/HR: at 21:58

## 2023-09-14 RX ADMIN — PROPOFOL 35 MCG/KG/MIN: 10 INJECTION, EMULSION INTRAVENOUS at 17:27

## 2023-09-14 RX ADMIN — PIPERACILLIN AND TAZOBACTAM 3.38 G: 36; 4.5 INJECTION, POWDER, FOR SOLUTION INTRAVENOUS at 21:57

## 2023-09-14 RX ADMIN — SODIUM CHLORIDE, SODIUM GLUCONATE, SODIUM ACETATE, POTASSIUM CHLORIDE AND MAGNESIUM CHLORIDE 100 ML/HR: 526; 502; 368; 37; 30 INJECTION, SOLUTION INTRAVENOUS at 13:57

## 2023-09-14 RX ADMIN — DEXAMETHASONE SODIUM PHOSPHATE 8 MG: 4 INJECTION INTRA-ARTICULAR; INTRALESIONAL; INTRAMUSCULAR; INTRAVENOUS; SOFT TISSUE at 21:57

## 2023-09-14 RX ADMIN — PIPERACILLIN AND TAZOBACTAM 3.38 G: 36; 4.5 INJECTION, POWDER, FOR SOLUTION INTRAVENOUS at 15:31

## 2023-09-14 RX ADMIN — PROPOFOL 40 MCG/KG/MIN: 10 INJECTION, EMULSION INTRAVENOUS at 05:29

## 2023-09-14 RX ADMIN — CHLORHEXIDINE GLUCONATE 15 ML: 1.2 SOLUTION ORAL at 08:54

## 2023-09-14 RX ADMIN — PROPOFOL 50 MCG/KG/MIN: 10 INJECTION, EMULSION INTRAVENOUS at 22:04

## 2023-09-14 RX ADMIN — DIPHENHYDRAMINE HYDROCHLORIDE 25 MG: 50 INJECTION, SOLUTION INTRAMUSCULAR; INTRAVENOUS at 17:27

## 2023-09-14 RX ADMIN — SODIUM CHLORIDE, SODIUM GLUCONATE, SODIUM ACETATE, POTASSIUM CHLORIDE AND MAGNESIUM CHLORIDE 100 ML/HR: 526; 502; 368; 37; 30 INJECTION, SOLUTION INTRAVENOUS at 23:21

## 2023-09-14 RX ADMIN — IOHEXOL 85 ML: 350 INJECTION, SOLUTION INTRAVENOUS at 15:15

## 2023-09-14 RX ADMIN — HEPARIN SODIUM 5000 UNITS: 5000 INJECTION INTRAVENOUS; SUBCUTANEOUS at 21:57

## 2023-09-14 RX ADMIN — HEPARIN SODIUM 5000 UNITS: 5000 INJECTION INTRAVENOUS; SUBCUTANEOUS at 05:28

## 2023-09-14 RX ADMIN — Medication 150 MCG/HR: at 02:42

## 2023-09-14 RX ADMIN — NYSTATIN 500000 UNITS: 100000 SUSPENSION ORAL at 21:58

## 2023-09-14 RX ADMIN — MIDAZOLAM HYDROCHLORIDE 2 MG: 2 INJECTION, SOLUTION INTRAMUSCULAR; INTRAVENOUS at 12:16

## 2023-09-14 RX ADMIN — IPRATROPIUM BROMIDE AND ALBUTEROL SULFATE 3 ML: 2.5; .5 SOLUTION RESPIRATORY (INHALATION) at 08:32

## 2023-09-14 RX ADMIN — FAMOTIDINE 20 MG: 10 INJECTION INTRAVENOUS at 08:54

## 2023-09-14 RX ADMIN — PIPERACILLIN AND TAZOBACTAM 3.38 G: 36; 4.5 INJECTION, POWDER, FOR SOLUTION INTRAVENOUS at 05:00

## 2023-09-14 RX ADMIN — DIPHENHYDRAMINE HYDROCHLORIDE 25 MG: 50 INJECTION, SOLUTION INTRAMUSCULAR; INTRAVENOUS at 13:53

## 2023-09-14 RX ADMIN — DIPHENHYDRAMINE HYDROCHLORIDE 25 MG: 50 INJECTION, SOLUTION INTRAMUSCULAR; INTRAVENOUS at 05:28

## 2023-09-14 RX ADMIN — SODIUM CHLORIDE, SODIUM GLUCONATE, SODIUM ACETATE, POTASSIUM CHLORIDE AND MAGNESIUM CHLORIDE 100 ML/HR: 526; 502; 368; 37; 30 INJECTION, SOLUTION INTRAVENOUS at 03:14

## 2023-09-14 RX ADMIN — CHLORHEXIDINE GLUCONATE 15 ML: 1.2 SOLUTION ORAL at 20:14

## 2023-09-14 RX ADMIN — FENTANYL CITRATE 100 MCG: 50 INJECTION INTRAMUSCULAR; INTRAVENOUS at 11:53

## 2023-09-14 RX ADMIN — PROPOFOL 40 MCG/KG/MIN: 10 INJECTION, EMULSION INTRAVENOUS at 08:57

## 2023-09-14 RX ADMIN — NYSTATIN 500000 UNITS: 100000 SUSPENSION ORAL at 08:54

## 2023-09-14 RX ADMIN — Medication 150 MCG/HR: at 10:28

## 2023-09-14 RX ADMIN — HEPARIN SODIUM 5000 UNITS: 5000 INJECTION INTRAVENOUS; SUBCUTANEOUS at 13:53

## 2023-09-14 RX ADMIN — IPRATROPIUM BROMIDE AND ALBUTEROL SULFATE 3 ML: 2.5; .5 SOLUTION RESPIRATORY (INHALATION) at 19:51

## 2023-09-14 NOTE — CONSULTS
OTOLARYNGOLOGY CONSULT    Date of Service: 9/14/2023    Reason for consult: airway blockage, requiring intubation    ASSESSMENT/PLAN:    - given pt's tobacco abuse hx and potentially chronic hx of enlarging mass causing discomfort in nose/throat, breathing difficulty, and oral cavity complaints, cancer is at top of ddx at this time  - pt not suitable for weaning off ventilator  - pt may require trach, biopsy of mass for long term care/management, will need to talk to family before additional interventions  - follow infectious etiology lab results  - rest of care per primary  - ENT will continue to follow    HPI  Melissa Esquivel is a 69 y.o. female w/ recent COVID/pneumonia requiring admission for infectious control earlier this yr, recently dc, [facility] on 9/13 due to SOB and hypoxia, w/ limited oral cavity space observed. Pt agreed to intubation for airway management on 9/14. CT imaging demonstrated abnormal findings as demonstrated below. Daughter at bedside answered questions, including pt is an active tobacco user for many yr, and has complained of worsening difficulty w/ breathing, "gum swelling," and discomfort at back of mouth for past 1.5-2wk. Pt's cause of acute difficult airway was attributed to angioedema due to lisinopril, w/ previous "angioedema" episode in 2020. LABORATORY  WBC (9/14): 8.1    RADIOLOGY  CT neck (9/14): Large enhancing soft tissue mass identified within the left neck involving multiple spaces. This appears to be centered within the left oropharynx with extensions as described above into multiple spaces. This results in significant airway compromise. 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 Small level 3 and level 4 nodes are seen within the neck. CTA PE (9/13):   No pulmonary embolus.     Patchy consolidation right middle lobe, new from 8/25/2023, compatible with pneumonia.     Slight regression of previous hilar and mediastinal lymphadenopathy.     Improving left upper lobe opacity which may have been due to pneumonia with residual atelectasis/scar.     Persistent partial atelectasis of the lower lobes.     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 PROCEDURES  FIBEROPTIC LARYNGOSCOPY:  Procedure:Fiberoptic nasopharyngolaryngoscopy  Diagnosis: nasopharyngeal mass  : Dr. Matthias Hernandez    The risks, benefits and alternatives were discussed. The patient voiced their understanding. They wished to proceed and an informed consent was obtained. The patient's nose was topically decongested and anesthetized using Lidocaine and oxymetazoline. The fiberoptic endoscope was inserted via both nasal cavities.   Findings listed below: cannot visualize well due to mucus pooling in b/l nasal cavities but can visualize mass at posterior nasopharyngeal region, blocking advancement of scope      [facility]  Current Facility-Administered Medications   Medication Dose Route Frequency Provider Last Rate Last Admin   • busPIRone (BUSPAR) tablet 30 mg  30 mg Oral TID Modesta Madison MD   30 mg at 09/14/23 1615   • chlorhexidine (PERIDEX) 0.12 % oral rinse 15 mL  15 mL Mouth/Throat Q12H [address] Modesta Madison MD   15 mL at 09/14/23 0854   • dexamethasone (DECADRON) injection 8 mg  8 mg Intravenous [facility] Modesta Madison MD   8 mg at 09/14/23 1353   • diphenhydrAMINE (BENADRYL) injection 25 mg  25 mg Intravenous Q6H Modesta Madison MD   25 mg at 09/14/23 1727   • Famotidine (PF) (PEPCID) injection 20 mg  20 mg Intravenous Q24H [address] Modesta Madison MD   20 mg at 09/14/23 0854   • fentaNYL 1000 mcg in sodium chloride 0.9% 100mL infusion  150 mcg/hr Intravenous Continuous RANDEE Lim 15 mL/hr at 09/14/23 1531 150 mcg/hr at 09/14/23 1531   • fentanyl citrate (PF) 100 MCG/2ML 50 mcg  50 mcg Intravenous Q2H PRN RANDEE Oswald   50 mcg at 09/14/23 0135   • heparin (porcine) subcutaneous injection 5,000 Units 5,000 Units Subcutaneous [facility] Anastasiya Jo MD   5,000 Units at 23 1353   • ipratropium-albuterol (DUO-NEB) 0.5-2.5 mg/3 mL inhalation solution 3 mL  3 mL Nebulization Q6H Anastasiya Jo MD   3 mL at 23 4631   • levalbuterol (Everlena Promise) inhalation solution 1.25 mg  1.25 mg Nebulization Q8H PRN Anastasiya Jo MD   1.25 mg at 23 1942   • multi-electrolyte (PLASMALYTE-A/ISOLYTE-S PH 7.4) IV solution  100 mL/hr Intravenous Continuous Anastasiya Jo  mL/hr at 23 1357 100 mL/hr at 23 1357   • nystatin (MYCOSTATIN) oral suspension 500,000 Units  500,000 Units Swish & Swallow 4x Daily RANDEE Wood   500,000 Units at 23 1727   • piperacillin-tazobactam (ZOSYN) 3.375 g in sodium chloride 0.9 % 100 mL IVPB (EXTENDED-INFUSION)  3.375 g Intravenous Q8H Madisyn Salinas  mL/hr at 23 0500 3.375 g at 23 1531   • propofol (DIPRIVAN) 1000 mg in 100 mL infusion (premix)  5-50 mcg/kg/min Intravenous Titrated RANDEE Wood 15.7 mL/hr at 23 1727 35 mcg/kg/min at 23 1727   • sertraline (ZOLOFT) tablet 50 mg  50 mg Oral Daily Anastasiya Jo MD   50 mg at 23 0848   • vancomycin (VANCOCIN) IVPB (premix in dextrose) 1,000 mg 200 mL  15 mg/kg (Adjusted) Intravenous Once PRN Marbella Mckeon MD           REVIEW OF SYSTEMS  As above    HISTORIES  PMH:  Past Medical History:   Diagnosis Date   • Anxiety    • Depression    • Fatty liver    • Hyperlipidemia    • Hypertension    • Psychiatric disorder    • Varicella        PSH:  Past Surgical History:   Procedure Laterality Date   • BREAST SURGERY Bilateral     Reduction Procedure   • CARDIAC SURGERY     •  SECTION      x4   • DILATION AND CURETTAGE OF UTERUS     • ECTOPIC PREGNANCY SURGERY         SH:  Social History     Tobacco Use   • Smoking status: Every Day     Packs/day: 1.00     Types: Cigarettes   • Smokeless tobacco: Never   • Tobacco comments:     2 Black and milds per day   Vaping Use   • Vaping Use: Never used   Substance Use Topics   • Alcohol use: Not Currently   • Drug use: Yes     Types: Marijuana     Comment: for pain       FH:  Family History   Problem Relation Age of Onset   • Lung cancer Mother    • Cirrhosis Father    • Hypertension Sister    • Bipolar disorder Sister    • Heart disease Sister    • Diabetes Family    • Heart disease Family    • Hypertension Family    • Stroke Family    • Thyroid disease Family        ALLERGIES:  Allergies   Allergen Reactions   • Methyldopa Shortness Of Breath and Other (See Comments)     Aldomet   • Lisinopril Angioedema     Came to the emergency room with diffuse tongue swelling, unable to breathe. Intubated with glide scope, transferred to ICU at [facility], started on steroids, Pepcid, Benadryl. Only culprit so far is lisinopril       PHYSICAL EXAM  Visit Vitals  /84   Pulse 62   Temp (!) 96.8 °F (36 °C)   Resp 16   Wt 78.5 kg (173 lb 1 oz)   SpO2 95%   BMI 32.70 kg/m²   OB Status Postmenopausal   Smoking Status Every Day   BSA 1.78 m²       General: intubated  Eyes: PERRL  Ears: External ears normal in appearance  Nose: External appearance normal  Oral cavity: intubated, difficulty assessing intraoral cavity space  Neck: Trachea is midline, no thyroid nodules, salivary glands symmetrical, no masses/abnormality on palpation  Lymph: No cervical lymphadenopathy  Skin: No obvious facial lesions  Neuro: Motor and sensory grossly intact, face symmetrical, no obvious cranial nerve palsies, motor and sensory grossly intact, no focal deficits.    Lungs: on ventilator  Vascular: Well perfused    Patient Active Problem List    Diagnosis Date Noted   • Angioedema 09/13/2023   • New onset right middle lobe pneumonia 09/13/2023   • PO (acute kidney injury) ([address]) 09/13/2023   • Upper airway cough syndrome 09/04/2023   • Vaginal itching 08/30/2023   • Ambulatory dysfunction 08/30/2023   • Acute respiratory failure with hypoxia ([address]) 08/25/2023   • Sepsis ([address]) 08/25/2023 • (HFpEF) heart failure with preserved ejection fraction ([address]) 08/25/2023   • Pulmonary embolism ([address]) 08/25/2023   • Acute pain of right knee 08/30/2021   • Stage 3a chronic kidney disease ([address]) 08/20/2021   • Bone lesion 06/18/2021   • Nicotine dependence 06/15/2021   • Bipolar disorder, unspecified ([address]) 06/10/2021   • Morbid (severe) obesity due to excess calories ([address]) 06/10/2021   • Abnormal computed tomography angiography (CTA) 11/03/2020   • Screening for colon cancer 07/15/2020   • Angio-edema, initial encounter 07/10/2020   • Elevated troponin 07/10/2020   • Leg cramps, sleep related 04/06/2016   • Pain syndrome, chronic 06/04/2014   • Pain of lower leg 12/11/2013   • 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   • Disc degeneration, lumbar 08/23/2013   • Lumbar radiculopathy 08/23/2013   • Myofascial pain syndrome 08/23/2013   • Osteoarthritis of spine with radiculopathy, lumbar region 08/23/2013   • Backache 04/10/2013   • Benign essential hypertension 05/11/2012   • Bipolar I disorder, single manic episode ([address]) 05/11/2012   • Hyperlipidemia 05/11/2012   • Lower back pain 05/11/2012       Treva Correia MD  PGY-2  Otolaryngology - Head and Neck Surgery  9/14/2023 5:44 PM

## 2023-09-14 NOTE — PROGRESS NOTES
Melissa Esquivel is a 69 y.o. female who is currently ordered Vancomycin IV with management by the Pharmacy Consult service. Relevant clinical data and objective / subjective history reviewed. Vancomycin Assessment:  Indication and Goal AUC/Trough: Pneumonia (goal -600, trough >10)  Clinical Status: critically ill  Micro:     Renal Function:  SCr: 1.4 mg/dL  CrCl: 34.6 mL/min  Renal replacement: not on dialysis  Days of Therapy: 2  Current Dose: 1000 mg as needed for random vancomycin level < 15  Vancomycin Plan:  New Dosin mg as needed for random vancomycin level < 15    Next Level: 9/15 @ 0600  Renal Function Monitoring: Daily BMP and [place] will continue to follow closely for s/sx of nephrotoxicity, infusion reactions and appropriateness of therapy. BMP and CBC will be ordered per protocol. We will continue to follow the patient’s culture results and clinical progress daily.     Elin Reyes, Pharmacist

## 2023-09-14 NOTE — ASSESSMENT & PLAN NOTE
• POA with O2 saturate around 80% on room air. Needed high flow 60 L to bring up her O2 saturation to 95%. • Recent hospitalization for respiratory failure second to pneumonia. • CTA PE study negative for PE. New onset RML PNA. • Persistent partial atelectasis of the lower lobes noted. • Current daily smoker. • Less likely second to acute exacerbation of CHF.     Plan:  • Continue intubation. SBT/ventilation elaboration today. • DuoNeb. Decadron. • Airway clearance protocol.  IS.

## 2023-09-14 NOTE — UTILIZATION REVIEW
NOTIFICATION OF INPATIENT ADMISSION   AUTHORIZATION REQUEST   SERVICING FACILITY:   [address] [place] [address]  Tax ID: [id number]  NPI: [id number]   ATTENDING PROVIDER:  Attending Name and NPI#: Cornellelijah Roger, [place] [[id number]]  Address: [address] [place] [address]  Phone: [phone]     ADMISSION INFORMATION:  Place of Service: Inpatient [address] [facility]  Place of Service Code: 21  Inpatient Admission Date/Time: 9/13/23  4:00 PM  Discharge Date/Time: No discharge date for patient encounter. Admitting Diagnosis Code/Description:  Angioedema, initial encounter [T78. 3XXA]     UTILIZATION REVIEW CONTACT:  Greg Tavarez Utilization   Network Utilization Review Department  Phone: [phone]  Fax: [phone]  Email: [email or web address]  Contact for approvals/pending authorizations, clinical reviews, and discharge. PHYSICIAN ADVISORY SERVICES:  Medical Necessity Denial & Tmbv-le-Nzrj Review  Phone: [phone]  Fax: [phone]  Email: [email or web address]

## 2023-09-14 NOTE — CASE MANAGEMENT
Case Management Progress Note    Patient name Jaron Vazquez  Location ICU 06/ICU 06 MRN [id number]  : 1955 Date 2023       LOS (days): 1  Geometric Mean LOS (GMLOS) (days): 3.80  Days to GMLOS:2.8        OBJECTIVE:        Current admission status: Inpatient  Preferred Pharmacy:   [facility]/pharmacy #[id number]- [place]ZZY GARAY - [address]. [address] [facility] [address]  Phone: [phone] Fax: [phone] [facility] [place] [address] [place] [address]  Phone: [phone] Fax: [phone]    Primary Care Provider: Edie Guthrie MD    Primary Insurance: [facility]  Secondary Insurance: [facility]    PROGRESS NOTE:    CM received a phone call from pt's [facility]  - Charu Robles [phone]. As per Charu Robles pt does have 40 hours a week via waiver services. Pt daughter, Katie Lester, is pt paid caregiver. Pt also receives MOW and has an emergent response button. As per Charu Robles she is requesting a call once pt is approaching dc readiness. If pt needs change to her services Charu Robles would need notice in order to have them set up.

## 2023-09-14 NOTE — ASSESSMENT & PLAN NOTE
Home regimen: Oxycodone 5 mg twice daily as needed. Tylenol 650 mg every 8 hours as needed. Gabapentin 600 mg 3 times daily. Medical marijuana. Per daughter, patient was overusing Tylenol over-the-counter. Switching to gabapentin 300 mg 3 times a day. Tylenol 65 mg every 8 hours as needed. May start oxycodone 5 mg twice daily as needed after extubated.

## 2023-09-14 NOTE — ASSESSMENT & PLAN NOTE
Lab Results   Component Value Date    CHOLESTEROL 203 (H) 01/24/2023    CHOLESTEROL 177 08/11/2022    CHOLESTEROL 184 07/08/2020     Lab Results   Component Value Date    HDL 45 (L) 01/24/2023    HDL 37 (L) 08/11/2022    HDL 44 (L) 07/08/2020     Lab Results   Component Value Date    TRIG 160 (H) 01/24/2023    TRIG 108 08/11/2022    TRIG 113 07/08/2020     Lab Results   Component Value Date    NONHDLC 146 07/12/2018    [address] [facility] 207 (H) 04/29/2013     Continue diet/ lifestyle modification.

## 2023-09-14 NOTE — ASSESSMENT & PLAN NOTE
Lab Results   Component Value Date    CHOLESTEROL 203 (H) 01/24/2023    CHOLESTEROL 177 08/11/2022    CHOLESTEROL 184 07/08/2020     Lab Results   Component Value Date    HDL 45 (L) 01/24/2023    HDL 37 (L) 08/11/2022    HDL 44 (L) 07/08/2020     Lab Results   Component Value Date    TRIG 160 (H) 01/24/2023    TRIG 108 08/11/2022    TRIG 113 07/08/2020     Lab Results   Component Value Date    NONHDLC 146 07/12/2018    [address] [facility] 207 (H) 04/29/2013     Continue outpatient diet/ lifestyle modification.

## 2023-09-14 NOTE — ASSESSMENT & PLAN NOTE
Lab Results   Component Value Date    CREATININE 1.40 (H) 09/14/2023    CREATININE 1.63 (H) 09/13/2023    CREATININE 1.15 08/30/2023       Likely prerenal.  Second to poor oral intake versus poor urine output. Baseline creatinine 1 to 1.2. Plan:  • UA w/ microscopic, Urinary retention protocol, Bladder scan, Daily weights, I/O  • IV Fluids: 100 cc/hr. • Monitor BMP daily and observe for downward trend of creatinine  • Avoid hypoperfusion of the kidneys, minimize nephrotoxins  • RAAS Blockers/Diuretics held: Lisinopril.

## 2023-09-14 NOTE — ASSESSMENT & PLAN NOTE
· 9/13 CT PE study: Patchy consolidation right middle lobe, new from 8/25/2023, compatible with pneumonia. · No leukocytosis, no fever/chills. · Positive for sore throat, cough. · Recent hospitalization for pneumonia noted. · New onset pneumonia after recent hospitalization and antibiotics treatment. · 9/14: Bronchoscopy/ ABL. · MRSA negative. · Preliminary cultures result came back negative so far. Plan:  · Continue Zosyn for broad coverage- suspected HAP. May consider to discontinue based on negative culture results. · Follow-up blood culture. · Follow ABL.

## 2023-09-14 NOTE — ASSESSMENT & PLAN NOTE
• POA with acute respiratory failure, SOB. • [facility]: Swollen tongue, swelling soft and hard palate and almost the head of all phalanx is completely closed. Severe angioedema. • Decadron 10 mg, Benadryl 25 mg given. • Initially refused but eventually agreed with intubation. • Prior episode of angioedema in 2020 noted. Suspected coadministration of increase the dose of tramadol and lisinopril. • Per daughter, there is no recent change in medications except Trelegy inhaler, cefdinir. • Etiology: May 2/2 oropharyngeal mass compression. Plan:  • Continue intubation.

## 2023-09-14 NOTE — ASSESSMENT & PLAN NOTE
• POA with acute respiratory failure, SOB. • On physical in [place] [facility] ED: Swollen tongue, swelling soft and hard palate and almost the head of all phalanx is completely closed. Severe angioedema. • Decadron 10 mg, Benadryl 25 mg given. • Initially refused but eventually agreed with intubation. • Prior episode of angioedema in 2020 noted. Suspected coadministration of increase the dose of tramadol and lisinopril. • Per daughter, there is no recent change in medications except Trelegy inhaler. • Etiology: Unclear. May 2 to infection induced thrombosis. 9/13: ICU physical limited by intubation. Negative for swollen tongue. Intubation cough gas leak test still positive for swollen airway. Plan:  • Continue intubation. SBT tomorrow. • Start Decadron and Benadryl. • Consider immunology/allergy follow-up as outpatient. • CT facial with contrast today. • Bronchoscopy today.

## 2023-09-14 NOTE — ASSESSMENT & PLAN NOTE
Well controlled at home. Home meds: Amlodipine 10 mg daily, Coreg 25 mg twice daily, lisinopril 10 mg daily. BP well controlled in ICU. Currently hold amlodipine, Coreg and lisinopril.

## 2023-09-14 NOTE — PROGRESS NOTES
[facility]  Progress Note  Name: Katelynn Jarrett  MRN: [id number]  Unit/Bed#: ICU 06 I Date of Admission: 9/13/2023   Date of Service: 9/14/2023 I Hospital Day: 1    Assessment/Plan   * Angioedema  Assessment & Plan  • POA with acute respiratory failure, SOB. • On physical in [place] [facility] ED: Swollen tongue, swelling soft and hard palate and almost the head of all phalanx is completely closed. Severe angioedema. • Decadron 10 mg, Benadryl 25 mg given. • Initially refused but eventually agreed with intubation. • Prior episode of angioedema in 2020 noted. Suspected coadministration of increase the dose of tramadol and lisinopril. • Per daughter, there is no recent change in medications except Trelegy inhaler. • Etiology: Unclear. May 2 to infection induced thrombosis. 9/13: ICU physical limited by intubation. Negative for swollen tongue. Intubation cough gas leak test still positive for swollen airway. Plan:  • Continue intubation. SBT tomorrow. • Start Decadron and Benadryl. • Consider immunology/allergy follow-up as outpatient. • CT facial with contrast today. • Bronchoscopy today. New onset right middle lobe pneumonia  Assessment & Plan  · 9/13 CT PE study: Patchy consolidation right middle lobe, new from 8/25/2023, compatible with pneumonia. · No leukocytosis, no fever/chills. · Positive for sore throat, cough. · Recent hospitalization for pneumonia noted. · New onset pneumonia after recent hospitalization and antibiotics treatment. Plan:  · Start Zosyn and vancomycin for broad coverage-suspected HAP. · Follow-up blood culture. · Sputum culture if extubated. · MRSA swab. · Bronchoscopy today. Acute respiratory failure with hypoxia (HCC)  Assessment & Plan  • POA with O2 saturate around 80% on room air. Needed high flow 60 L to bring up her O2 saturation to 95%. • Recent hospitalization for respiratory failure second to pneumonia.   • CTA PE study negative for PE. New onset RML PNA. • Persistent partial atelectasis of the lower lobes noted. • Current daily smoker. • Less likely second to acute exacerbation of CHF.     Plan:  • Continue intubation. SBT/ventilation elaboration today. • DuoNeb. Decadron. • Airway clearance protocol. IS.    PO (acute kidney injury) [facility])  Assessment & Plan  Lab Results   Component Value Date    CREATININE 1.40 (H) 09/14/2023    CREATININE 1.63 (H) 09/13/2023    CREATININE 1.15 08/30/2023       Likely prerenal.  Second to poor oral intake versus poor urine output. Baseline creatinine 1 to 1.2. Plan:  • UA w/ microscopic, Urinary retention protocol, Bladder scan, Daily weights, I/O  • IV Fluids: 100 cc/hr. • Monitor BMP daily and observe for downward trend of creatinine  • Avoid hypoperfusion of the kidneys, minimize nephrotoxins  • RAAS Blockers/Diuretics held: Lisinopril. (HFpEF) heart failure with preserved ejection fraction [facility])  Assessment & Plan  Wt Readings from Last 3 Encounters:   09/14/23 78.5 kg (173 lb 1 oz)   09/07/23 74.6 kg (164 lb 6.4 oz)   08/30/23 73.3 kg (161 lb 9.6 oz)     Lab Results   Component Value Date    LVEF 60 08/28/2023     (H) 09/13/2023     (H) 08/25/2023     • Volume status: Euvolemic. • POA physical (limited): JVD-, HJR-, B/L LE trace to 1+ edema. • Echo 8/28/23: LVEF 60%. D1DD. • Likely not acute exacerbation.     Plan:  • CMP, magnesium tomorrow a.m; Goal Mg > 2 and K > 4; Replete prn  • HOB > 30°, Daily standing weights, Measure I/O    Pain syndrome, chronic  Assessment & Plan  Home regimen: Oxycodone 5 mg twice daily as needed. Tylenol 650 mg every 8 hours as needed. Gabapentin 600 mg 3 times daily. Medical marijuana. Per daughter, patient was overusing Tylenol over-the-counter. Switching to gabapentin 300 mg 3 times a day. Tylenol 65 mg every 8 hours as needed. May start oxycodone 5 mg twice daily as needed after extubated.       Hyperlipidemia  Assessment & Plan  Lab Results   Component Value Date    CHOLESTEROL 203 (H) 01/24/2023    CHOLESTEROL 177 08/11/2022    CHOLESTEROL 184 07/08/2020     Lab Results   Component Value Date    HDL 45 (L) 01/24/2023    HDL 37 (L) 08/11/2022    HDL 44 (L) 07/08/2020     Lab Results   Component Value Date    TRIG 160 (H) 01/24/2023    TRIG 108 08/11/2022    TRIG 113 07/08/2020     Lab Results   Component Value Date    NONHDLC 146 07/12/2018    [address] 207 (H) 04/29/2013     Continue diet/ lifestyle modification. Benign essential hypertension  Assessment & Plan  Well controlled at home. Home meds: Amlodipine 10 mg daily, Coreg 25 mg twice daily, lisinopril 10 mg daily. BP well controlled in ICU. Currently hold amlodipine, Coreg and lisinopril. Ambulatory dysfunction  Assessment & Plan  PT/ OT eval.           ICU Core Measures     Vented Patient  VAP Bundle  VAP bundle ordered     A: Assess, Prevent, and Manage Pain · Has pain been assessed? NA  · Need for changes to pain regimen? Yes   B: Both Spontaneous Awakening Trials (SATs) and Spontaneous Breathing Trials (SBTs) · Plan to perform spontaneous awakening trial today? Yes   · Plan to perform spontaneous breathing trial today? Yes   · Obvious barriers to extubation? No   C: Choice of Sedation · RASS Goal: -2 Light Sedation or 0 Alert and Calm  · Need for changes to sedation or analgesia regimen? No   D: Delirium · CAM-ICU: Unable to perform secondary to sedation. E: Early Mobility  · Plan for early mobility? Yes   F: Family Engagement · Plan for family engagement today? Yes       Antibiotic Review: Awaiting culture results. Review of Invasive Devices:     Slater Plan: Voiding trial after improvement in ambulation         Prophylaxis:  VTE VTE covered by:  heparin (porcine), Subcutaneous, 5,000 Units at 09/14/23 0528       Stress Ulcer  covered byFamotidine (PF) (PEPCID) injection 20 mg [[id number]]       Subjective   HPI/24hr events: No acute overnight event.,,    Patient was seen and examined at bedside. She was sedated but arousable by chest rubbing. Per urine output from last night on IVF. Bladder scan today. Pending CT facial with contrast and bronchoscopy today. SBT today as well. Objective                            Vitals I/O      Most Recent Min/Max in 24hrs   Temp (!) 97 °F (36.1 °C) Temp  Min: 97 °F (36.1 °C)  Max: 99 °F (37.2 °C)   Pulse 67 Pulse  Min: 66  Max: 113   Resp 16 Resp  Min: 12  Max: 28   /72 BP  Min: 92/56  Max: 170/102   O2 Sat 95 % SpO2  Min: 83 %  Max: 100 %      Intake/Output Summary (Last 24 hours) at 9/14/2023 0718  Last data filed at 9/14/2023 3151  Gross per 24 hour   Intake 1977.3 ml   Output 645 ml   Net 1332.3 ml         Diet NPO     Invasive Monitoring Physical exam    Physical Exam  Constitutional:       Interventions: She is sedated and intubated. Comments: Physical examination was limited secondary patient was sedated. General: Sedated. Arousable by chest rubbing. Skin: no rashes, no jaundice/pale  Eyes: Closed. ENT: moist lips and mucous membranes. Swollen lips? And swollen tongue. Chest: Intubated. No labored breath. Bilateral upper lobe rhonchi. Right middle lobe rales. Decreased breath sound on left lower lobe. Posterior of lung was not able to be evaluated. CVS:  normal rate, regular rhythm  Abdomen: soft, non-distended. +bowel sounds. Extremities: Right lower leg negative to trace edema. Right lower extremity no edema. : Slater in place  Neuro/ Psych: Sedated.        Pulmonary:      Effort: She is intubated. Diagnostic Studies      EKG: No new EKG  Imaging: No new imaging I have personally reviewed pertinent reports.    and I have personally reviewed pertinent films in PACS     Medications:  Scheduled PRN   busPIRone, 30 mg, TID  chlorhexidine, 15 mL, Q12H [facility]  dexamethasone, 8 mg, Q8H ASHLEY  diphenhydrAMINE, 25 mg, Q6H  famotidine, 20 mg, Q24H ASHLEY  heparin (porcine), 5,000 Units, Q8H [facility]  ipratropium-albuterol, 3 mL, Q6H  nystatin, 500,000 Units, 4x Daily  piperacillin-tazobactam, 3.375 g, Q8H  sertraline, 50 mg, Daily      fentanyl citrate (PF), 50 mcg, Q2H PRN  levalbuterol, 1.25 mg, Q8H PRN  vancomycin, 15 mg/kg (Adjusted), Once PRN       Continuous    fentaNYL, 150 mcg/hr, Last Rate: 150 mcg/hr (09/14/23 0242)  multi-electrolyte, 100 mL/hr, Last Rate: 100 mL/hr (09/14/23 0314)  propofol, 5-50 mcg/kg/min, Last Rate: 40 mcg/kg/min (09/14/23 0529)         Labs:    CBC    Recent Labs     09/13/23 1158 09/13/23  1654   WBC 8.44  --    HGB 15.6* 14.6   HCT 48.7* 43     --      BMP    Recent Labs     09/13/23 1158 09/13/23  1654 09/14/23  0530   SODIUM 139  --  137   K 4.0  --  4.4     --  109*   CO2 26 22 16*   AGAP 10  --  12   BUN 30*  --  32*   CREATININE 1.63*  --  1.40*   CALCIUM 9.7  --  8.1*       Coags    Recent Labs     09/13/23  1158   INR 0.92        Additional Electrolytes  Recent Labs     09/13/23 1654 09/14/23  0530   MG  --  2.4   PHOS  --  4.3*   CAIONIZED 1.23  --           Blood Gas    Recent Labs     09/13/23  1345   PHART 7.301*   HQP6JPA 48.9*   PO2ART 142.8*   TOA6QGQ 23.6   BEART -3.4   SOURCE Radial, Left     Recent Labs     09/13/23  1345   SOURCE Radial, Left    LFTs  Recent Labs     09/13/23 1158 09/14/23  0530   ALT 14 8   AST 16 16   ALKPHOS 59 45   ALB 3.6 2.8*   TBILI 0.54 0.34       Infectious  Recent Labs     09/13/23 1158 09/14/23  0530   PROCALCITONI 0.09 0.36*     Glucose  Recent Labs     09/13/23 1158 09/14/23  0530   GLUC 91 118               Christine Olson MD

## 2023-09-14 NOTE — ASSESSMENT & PLAN NOTE
· 9/13 CT PE study: Patchy consolidation right middle lobe, new from 8/25/2023, compatible with pneumonia. · No leukocytosis, no fever/chills. · Positive for sore throat, cough. · Recent hospitalization for pneumonia noted. · New onset pneumonia after recent hospitalization and antibiotics treatment. Plan:  · Start Zosyn and vancomycin for broad coverage-suspected HAP. · Follow-up blood culture. · Sputum culture if extubated. · MRSA swab. · Bronchoscopy today.

## 2023-09-14 NOTE — ASSESSMENT & PLAN NOTE
Wt Readings from Last 3 Encounters:   09/14/23 78.5 kg (173 lb 1 oz)   09/07/23 74.6 kg (164 lb 6.4 oz)   08/30/23 73.3 kg (161 lb 9.6 oz)     Lab Results   Component Value Date    LVEF 60 08/28/2023     (H) 09/13/2023     (H) 08/25/2023     • Volume status: Euvolemic. • POA physical (limited): JVD-, HJR-, B/L LE trace to 1+ edema. • Echo 8/28/23: LVEF 60%. D1DD.   • Likely not acute exacerbation.     Plan:  • CMP, magnesium tomorrow a.m; Goal Mg > 2 and K > 4; Replete prn  • HOB > 30°, Daily standing weights, Measure I/O

## 2023-09-14 NOTE — ASSESSMENT & PLAN NOTE
· Home regimen: Oxycodone 5 mg twice daily as needed. Tylenol 650 mg every 8 hours as needed. Gabapentin 600 mg 3 times daily. Medical marijuana. · Per daughter, patient was overusing Tylenol over-the-counter. · Pain mostly from lumbar radiculopathy. · Impaired ambulatory dysfunction second to pain. Plan:  · Will readdress after patient gets extubated. · May consult acute pain service versus palliative care.

## 2023-09-14 NOTE — UTILIZATION REVIEW
Initial Clinical Review    Admission: Date/Time/Statement:   Admission Orders (From admission, onward)     Ordered        09/13/23 1605  Inpatient Admission  Once                      Orders Placed This Encounter   Procedures   • Inpatient Admission     Standing Status:   Standing     Number of Occurrences:   1     Order Specific Question:   Level of Care     Answer:   Critical Care [15]     Order Specific Question:   Estimated length of stay     Answer:   More than 2 Midnights     Order Specific Question:   Certification     Answer:   I certify that inpatient services are medically necessary for this patient for a duration of greater than two midnights. See H&P and MD Progress Notes for additional information about the patient's course of treatment. Arrival Information     Patient  Transfer to [facility] from [facility] due to need ICU/mechanical ventilation. chief complaint: shortness of breath       Initial Presentation: 69 y.o. female  From ED [facility] via ems admitted inpatient due to angioedema/respiratory failure with new right middle lobe opacity/Mediastinal lymphadenopathy/COPD/PO/heart failure not in exacerbation/PO. Presented to [facility] due to respiratory distress, pulse ox of 83% room air. For several days prior to arrival with left sided facial discomfort, poor intake. New medications of cefdinir and trelegy. On Lisinopril. On exam: swollen tongue swelling soft and hard palate and almost the head of all phalanx is completely closed. Lungs rhonchi. Ct showed pneumonia. Creatinine 1.63 with baseline of 1 - 1.2    Initially in ED refused intubation initially, started on HFNC,  then eventually agreed. Given Decadron, Benadryl, FFP. Intubated. Started propofol for sedation. Rocephin and doxycycline. Transfer to [address] [place] [address]: remains intubated. On exam appears chronically ill. Lungs rales at bases. Alert and nods head appropriately.   + cuff leak. Plan is ventilator and sedation with fentanyl and propofol. Bronchoscopy. Hold Lisinopril. Continue H2 blocker, benadryl and steroids. Start Nystatin. Duonebs. Hold Trelegy, amlodipine and Coreg. .    Trend BMP. Continue vancomycin and Zosyn. Trend BMP. Date: 9/14/23    Day 2:  Sedated, arouses with chest rubbing. On exam: intubated and sedated. Swollen lips?  negative  Swollen tongue. Bilateral upper lobe rhonchi. Decreased breath sounds left lobe. Slater. Bun 32, creatinine 1.40. Continue intubation. Bronch. Ct facial with contrast.  Bronch. Continue Decadron and Benadryl. SBT tomorrow. Continue antibiotics.      ED Triage Vitals   Temperature Pulse Respirations Blood Pressure SpO2   09/13/23 1621 09/13/23 1621 09/13/23 1621 09/13/23 1621 09/13/23 1621   98.8 °F (37.1 °C) 68 12 92/56 92 %      Temp src Heart Rate Source Patient Position - Orthostatic VS BP Location FiO2 (%)   -- -- 09/14/23 1000 09/14/23 1000 09/13/23 1621     Lying Left arm 50      Pain Score       09/13/23 1807       Med Not Given for Pain - for MAR use only          Wt Readings from Last 1 Encounters:   09/14/23 78.5 kg (173 lb 1 oz)     Additional Vital Signs: ventilator  09/14/23 0900 97.3 °F (36.3 °C) Abnormal  71 16 157/72 103 94 % -- -- --   09/14/23 0800 97 °F (36.1 °C) Abnormal  77 18 150/85 111 94 % -- -- --   09/14/23 0700 96.8 °F (36 °C) Abnormal  68 17 -- -- 96 % -- -- --   09/14/23 0600 97 °F (36.1 °C) Abnormal  67 16 122/72 92 95 % -- -- --   09/14/23 0500 97.2 °F (36.2 °C) Abnormal  66 16 134/71 94 96 % -- -- --   09/14/23 0400 97.3 °F (36.3 °C) Abnormal  68 16 125/72 93 96 % -- -- --   09/14/23 0300 97.5 °F (36.4 °C) 67 16 125/73 94 95 % -- -- --   09/14/23 0200 97.7 °F (36.5 °C) 74 16 117/65 84 94 % -- -- --   09/14/23 0100 97.5 °F (36.4 °C) 66 16 126/81 98 95 % -- -- --   09/14/23 0046 -- -- -- -- -- 95 % -- -- --   09/14/23 0000 97.5 °F (36.4 °C) 67 16 126/73 95 95 % -- -- --   09/13/23 2300 97.7 °F (36.5 °C) 68 16 117/69 88 95 % -- -- --   09/13/23 2200 97.7 °F (36.5 °C) 68 16 114/68 86 95 % -- -- --   09/13/23 2100 98.1 °F (36.7 °C) 69 16 111/64 82 95 % -- -- --   09/13/23 2000 98.2 °F (36.8 °C) 71 16 108/68 83 95 % -- -- --   09/13/23 1900 98.6 °F (37 °C) 70 16 101/65 78 96 % -- -- --   09/13/23 1800 99 °F (37.2 °C) 73 16 93/60 71 97 % -- -- --   09/13/23 1700 99 °F (37.2 °C) 76 16 135/79 102 95 % --         Pertinent Labs/Diagnostic Test Results:   CT soft tissue neck w contrast    (Results Pending)     XR chest 1 view portable   Final Result by Katherin Navarrete DO (09/13 8377)       1. Endotracheal tube terminates 3.3 cm above the sujata.       2. Bibasilar airspace opacities are again demonstrated.               Workstation performed: [id number]           CTA ED chest PE Study   Final Result by Carine Javier MD (09/13 0882)       No pulmonary embolus.       Patchy consolidation right middle lobe, new from 8/25/2023, compatible with pneumonia.       Slight regression of previous hilar and mediastinal lymphadenopathy.       Improving left upper lobe opacity which may have been due to pneumonia with residual atelectasis/scar.       Persistent partial atelectasis of the lower lobes.       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           Workstation performed: [id number]           XR chest 1 view portable   ED Interpretation by Patricia Carnes MD (09/13 6665)   Postintubation x-ray; ET tube above the sujata by about 4 cm Above sujata will push the tube down.,  Loss of volume of the left lower lobe. Patchy infiltrate on the right lower lobe       Final Result by Katherin Navarrete DO (09/13 8091)       1. Endotracheal tube terminates 4 cm above the sujata. 2. Redemonstration of bibasilar airspace opacities.                Workstation performed: [id number]           XR chest portable   ED Interpretation by Elodia Sellers MD (09/13 1306)   CXR; LOSS of volume LLL, Patchy infiltrate RLL.     Final Result by Adolph Calhoun DO (09/13 1446)       1. Hazy bibasilar airspace opacities which may represent atelectasis versus pneumonia.  Left basilar opacity is similar to recent radiograph while right basilar opacity is new           Workstation performed: [id number]     9/13/23 ecg Normal sinus rhythm   Right atrial enlargement   Left axis deviation   Baseline artifact   Minimal voltage criteria for LVH, may be normal variant   Abnormal ECG   When compared with ECG of 25-AUG-2023 11:57,   QRS axis Shifted left    Results from last 7 days   Lab Units 09/14/23  0641 09/13/23  1654 09/13/23  1158   WBC Thousand/uL 8.10  --  8.44   HEMOGLOBIN g/dL 12.2  --  15.6*   I STAT HEMOGLOBIN g/dl  --  14.6  --    HEMATOCRIT % 37.1  --  48.7*   HEMATOCRIT, ISTAT %  --  43  --    PLATELETS Thousands/uL 237  --  309   NEUTROS ABS Thousands/µL  --   --  6.19   BANDS PCT % 2  --   --      Results from last 7 days   Lab Units 09/14/23  0530 09/13/23  1654 09/13/23  1158   SODIUM mmol/L 137  --  139   POTASSIUM mmol/L 4.4  --  4.0   CHLORIDE mmol/L 109*  --  103   CO2 mmol/L 16*  --  26   CO2, I-STAT mmol/L  --  22  --    ANION GAP mmol/L 12  --  10   BUN mg/dL 32*  --  30*   CREATININE mg/dL 1.40*  --  1.63*   EGFR ml/min/1.73sq m 38  --  31   CALCIUM mg/dL 8.1*  --  9.7   CALCIUM, IONIZED, ISTAT mmol/L  --  1.23  --    MAGNESIUM mg/dL 2.4  --   --    PHOSPHORUS mg/dL 4.3*  --   --      Results from last 7 days   Lab Units 09/14/23  0530 09/13/23  1158   AST U/L 16 16   ALT U/L 8 14   ALK PHOS U/L 45 59   TOTAL PROTEIN g/dL 6.1* 7.7   ALBUMIN g/dL 2.8* 3.6   TOTAL BILIRUBIN mg/dL 0.34 0.54     Results from last 7 days   Lab Units 09/14/23  0530 09/13/23  1158   GLUCOSE RANDOM mg/dL 118 91     Results from last 7 days   Lab Units 09/13/23  1345   PH ART  7.301*   PCO2 ART mm Hg 48.9*   PO2 ART mm Hg 142.8*   HCO3 ART mmol/L 23.6   BASE EXC ART mmol/L -3.4   O2 CONTENT ART mL/dL 24.9*   O2 HGB, ARTERIAL % 94.9   ABG SOURCE  Radial, Left     Results from last 7 days   Lab Units 09/14/23  0843   PH PERICO  7.284*   PCO2 PERICO mm Hg 40.6*   PO2 PERICO mm Hg 51.6*   HCO3 PERICO mmol/L 18.8*   BASE EXC PERICO mmol/L -7.4   O2 CONTENT PERICO ml/dL 15.5   O2 HGB, VENOUS % 81.1*     Results from last 7 days   Lab Units 09/13/23  1654   PH, PERICO I-STAT  7.346   PCO2, PERICO ISTAT mm HG 37.7*   PO2, PERICO ISTAT mm HG 70.0*   HCO3, PERICO ISTAT mmol/L 20.6*   I STAT BASE EXC mmol/L -5*   I STAT O2 SAT % 93*     Results from last 7 days   Lab Units 09/13/23  1158   PROTIME seconds 12.7   INR  0.92     Results from last 7 days   Lab Units 09/14/23  0530 09/13/23  1158   PROCALCITONIN ng/ml 0.36* 0.09     Results from last 7 days   Lab Units 09/14/23  0843   LACTIC ACID mmol/L 1.1     Results from last 7 days   Lab Units 09/13/23  1158   BNP pg/mL 144*     Results from last 7 days   Lab Units 09/13/23  1357   CLARITY UA  Clear   COLOR UA  Yellow   SPEC GRAV UA  >=1.030   PH UA  5.5   GLUCOSE UA mg/dl Negative   KETONES UA mg/dl Negative   BLOOD UA  Trace-Intact*   PROTEIN UA mg/dl 3+*   NITRITE UA  Negative   BILIRUBIN UA  1+*   UROBILINOGEN UA E.U./dl 0.2   LEUKOCYTES UA  Negative   WBC UA /hpf 4-10*   RBC UA /hpf 0-5   BACTERIA UA /hpf Occasional   EPITHELIAL CELLS WET PREP /hpf Occasional   MUCUS THREADS  Occasional*     Results from last 7 days   Lab Units 09/13/23  1158   BLOOD CULTURE  No Growth at 24 hrs. No Growth at 24 hrs.      Results from last 7 days   Lab Units 09/14/23  0843   VANCOMYCIN RM ug/mL 11.0     Past Medical History:   Diagnosis Date   • Anxiety    • Depression    • Fatty liver    • Hyperlipidemia    • Hypertension    • Psychiatric disorder    • Varicella      Present on Admission:  • Angioedema  • Benign essential hypertension  • Hyperlipidemia  • Pain syndrome, chronic  • Acute respiratory failure with hypoxia (HCC)  • (HFpEF) heart failure with preserved ejection fraction [facility])  • Ambulatory dysfunction      Admitting Diagnosis: Angioedema, initial encounter [T78. 3XXA]  Age/Sex: 69 y.o. female  Admission Orders:  09/13/23 1605 inpatient   Scheduled Medications:  busPIRone, 30 mg, Oral, TID  chlorhexidine, 15 mL, Mouth/Throat, Q12H ASHLEY  dexamethasone, 8 mg, Intravenous, Q8H ASHLEY  diphenhydrAMINE, 25 mg, Intravenous, Q6H  famotidine, 20 mg, Intravenous, Q24H ASHLEY  heparin (porcine), 5,000 Units, Subcutaneous, Q8H [address]  ipratropium-albuterol, 3 mL, Nebulization, Q6H  nystatin, 500,000 Units, Swish & Swallow, 4x Daily  piperacillin-tazobactam, 3.375 g, Intravenous, Q8H  sertraline, 50 mg, Oral, Daily    fentanyl citrate (PF) 100 MCG/2ML 100 mcg  Dose: 100 mcg  Freq: Once Route: IV  Start: 09/14/23 1200 End: 09/14/23 1153  fentanyl citrate (PF) 100 MCG/2ML 50 mcg x 2 9/13/23   Dose: 50 mcg  Freq: Once Route: IV    midazolam (VERSED) injection 2 mg x 2 9/14/23   Dose: 2 mg  Freq: Once Route: IV    Succinylcholine Chloride 100 mg/5 mL syringe 100 mg  Dose: 100 mg  Freq: Once Route: IV  Start: 09/14/23 1215 End: 09/14/23 1216    vancomycin (VANCOCIN) 1500 mg in sodium chloride 0.9% 250 mL IVPB  Dose: 25 mg/kg  Weight Dosing Info: 58.5 kg (Adjusted)  Freq: Once Route: IV  Last Dose: Stopped (09/13/23 2301)  Start: 09/13/23 1900 End: 09/13/23 2301  vancomycin (VANCOCIN) IVPB (premix in dextrose) 1,000 mg 200 mL  Dose: 15 mg/kg  Weight Dosing Info: 58.5 kg (Adjusted)  Freq:  Once Route: IV  Last Dose: 1,000 mg (09/14/23 1026)  Start: 09/14/23 1000 End: 09/14/23 1126    Continuous IV Infusions:  fentaNYL, 150 mcg/hr, Intravenous, Continuous  multi-electrolyte, 100 mL/hr, Intravenous, Continuous  propofol, 5-50 mcg/kg/min, Intravenous, Titrated    PRN Meds:  fentanyl citrate (PF), 50 mcg, Intravenous, Q2H PRN x 1 9/14/23   levalbuterol, 1.25 mg, Nebulization, Q8H PRN x 1 9/13/23   vancomycin, 15 mg/kg (Adjusted), Intravenous, Once PRN      Mechanical ventilation  Cardio pulmonary monitoring   Restraints non violent   NPO    Network Utilization Review Department  ATTENTION: Please call with any questions or concerns to [phone] and carefully listen to the prompts so that you are directed to the right person. All voicemails are confidential.  Cynthia Stone all requests for admission clinical reviews, approved or denied determinations and any other requests to dedicated fax number below belonging to the campus where the patient is receiving treatment.  List of dedicated fax numbers for the Facilities:  [place] DENIALS (Administrative/Medical Necessity) [phone] [address] [facility] (Maternity/NICU/Pediatrics) [phone]   [address] [place] [address] [phone]   [facility] [address] [facility] [phone] [address] [place] [address] [address] [place] [address] [phone]   [address] [place] [address] [phone]

## 2023-09-14 NOTE — PROGRESS NOTES
Sundeep Huitron is a 69 y.o. female who is currently ordered Vancomycin IV with management by the Pharmacy Consult service. Relevant clinical data and objective / subjective history reviewed. Vancomycin Assessment:  Indication and Goal AUC/Trough: Pneumonia (goal -600, trough >10)  Clinical Status:  critical  Micro:   Blood cultures in process  Renal Function:  SCr: 1.63 mg/dL  CrCl: 30 mL/min  Renal replacement: Not on dialysis  Days of Therapy: 1  Current Dose: 1500 mg IV once for loading dose  Vancomycin Plan:  New Dosing: pulse dosing 15 mg/kg (1000 mg) IV PRN for random vanco level < or = 15  Next Level: 9/14 @ 0900  Renal Function Monitoring: Daily BMP and [place] will continue to follow closely for s/sx of nephrotoxicity, infusion reactions and appropriateness of therapy. BMP and CBC will be ordered per protocol. We will continue to follow the patient’s culture results and clinical progress daily.     Brigette Cardenas, Pharmacist

## 2023-09-15 PROBLEM — J39.2 OROPHARYNGEAL MASS: Status: ACTIVE | Noted: 2023-09-15

## 2023-09-15 LAB
ANION GAP SERPL CALCULATED.3IONS-SCNC: 8 MMOL/L
BUN SERPL-MCNC: 21 MG/DL (ref 5–25)
CALCIUM SERPL-MCNC: 8.3 MG/DL (ref 8.4–10.2)
CHLORIDE SERPL-SCNC: 109 MMOL/L (ref 96–108)
CO2 SERPL-SCNC: 20 MMOL/L (ref 21–32)
CREAT SERPL-MCNC: 1.06 MG/DL (ref 0.6–1.3)
ERYTHROCYTE [DISTWIDTH] IN BLOOD BY AUTOMATED COUNT: 16.4 % (ref 11.6–15.1)
GFR SERPL CREATININE-BSD FRML MDRD: 53 ML/MIN/1.73SQ M
GLUCOSE SERPL-MCNC: 129 MG/DL (ref 65–140)
GLUCOSE SERPL-MCNC: 133 MG/DL (ref 65–140)
GLUCOSE SERPL-MCNC: 134 MG/DL (ref 65–140)
GLUCOSE SERPL-MCNC: 147 MG/DL (ref 65–140)
GLUCOSE SERPL-MCNC: 148 MG/DL (ref 65–140)
HCT VFR BLD AUTO: 41.5 % (ref 34.8–46.1)
HGB BLD-MCNC: 13.4 G/DL (ref 11.5–15.4)
MAGNESIUM SERPL-MCNC: 2.6 MG/DL (ref 1.9–2.7)
MCH RBC QN AUTO: 31.6 PG (ref 26.8–34.3)
MCHC RBC AUTO-ENTMCNC: 32.3 G/DL (ref 31.4–37.4)
MCV RBC AUTO: 98 FL (ref 82–98)
MRSA NOSE QL CULT: NORMAL
PHOSPHATE SERPL-MCNC: 3.3 MG/DL (ref 2.3–4.1)
PLATELET # BLD AUTO: 222 THOUSANDS/UL (ref 149–390)
PMV BLD AUTO: 9.4 FL (ref 8.9–12.7)
POTASSIUM SERPL-SCNC: 4.1 MMOL/L (ref 3.5–5.3)
PROCALCITONIN SERPL-MCNC: 0.22 NG/ML
RBC # BLD AUTO: 4.24 MILLION/UL (ref 3.81–5.12)
RHODAMINE-AURAMINE STN SPEC: NORMAL
SODIUM SERPL-SCNC: 137 MMOL/L (ref 135–147)
VANCOMYCIN SERPL-MCNC: 14.1 UG/ML (ref 10–20)
WBC # BLD AUTO: 13.33 THOUSAND/UL (ref 4.31–10.16)

## 2023-09-15 PROCEDURE — 94760 N-INVAS EAR/PLS OXIMETRY 1: CPT

## 2023-09-15 PROCEDURE — 83735 ASSAY OF MAGNESIUM: CPT | Performed by: STUDENT IN AN ORGANIZED HEALTH CARE EDUCATION/TRAINING PROGRAM

## 2023-09-15 PROCEDURE — 80202 ASSAY OF VANCOMYCIN: CPT | Performed by: STUDENT IN AN ORGANIZED HEALTH CARE EDUCATION/TRAINING PROGRAM

## 2023-09-15 PROCEDURE — 94640 AIRWAY INHALATION TREATMENT: CPT

## 2023-09-15 PROCEDURE — 84100 ASSAY OF PHOSPHORUS: CPT | Performed by: STUDENT IN AN ORGANIZED HEALTH CARE EDUCATION/TRAINING PROGRAM

## 2023-09-15 PROCEDURE — 82948 REAGENT STRIP/BLOOD GLUCOSE: CPT

## 2023-09-15 PROCEDURE — 94003 VENT MGMT INPAT SUBQ DAY: CPT

## 2023-09-15 PROCEDURE — 84145 PROCALCITONIN (PCT): CPT | Performed by: STUDENT IN AN ORGANIZED HEALTH CARE EDUCATION/TRAINING PROGRAM

## 2023-09-15 PROCEDURE — 94150 VITAL CAPACITY TEST: CPT

## 2023-09-15 PROCEDURE — 85027 COMPLETE CBC AUTOMATED: CPT | Performed by: STUDENT IN AN ORGANIZED HEALTH CARE EDUCATION/TRAINING PROGRAM

## 2023-09-15 PROCEDURE — 99221 1ST HOSP IP/OBS SF/LOW 40: CPT | Performed by: OTOLARYNGOLOGY

## 2023-09-15 PROCEDURE — 80048 BASIC METABOLIC PNL TOTAL CA: CPT | Performed by: STUDENT IN AN ORGANIZED HEALTH CARE EDUCATION/TRAINING PROGRAM

## 2023-09-15 PROCEDURE — 99291 CRITICAL CARE FIRST HOUR: CPT | Performed by: STUDENT IN AN ORGANIZED HEALTH CARE EDUCATION/TRAINING PROGRAM

## 2023-09-15 RX ORDER — AMLODIPINE BESYLATE 5 MG/1
10 TABLET ORAL DAILY
Status: DISCONTINUED | OUTPATIENT
Start: 2023-09-15 | End: 2023-09-16

## 2023-09-15 RX ORDER — VANCOMYCIN HYDROCHLORIDE 1 G/200ML
15 INJECTION, SOLUTION INTRAVENOUS DAILY PRN
Status: DISCONTINUED | OUTPATIENT
Start: 2023-09-15 | End: 2023-09-16

## 2023-09-15 RX ORDER — VANCOMYCIN HYDROCHLORIDE 1 G/200ML
15 INJECTION, SOLUTION INTRAVENOUS ONCE
Status: DISCONTINUED | OUTPATIENT
Start: 2023-09-15 | End: 2023-09-15

## 2023-09-15 RX ORDER — VANCOMYCIN HYDROCHLORIDE 1 G/200ML
15 INJECTION, SOLUTION INTRAVENOUS ONCE
Status: COMPLETED | OUTPATIENT
Start: 2023-09-15 | End: 2023-09-16

## 2023-09-15 RX ORDER — ENOXAPARIN SODIUM 100 MG/ML
40 INJECTION SUBCUTANEOUS
Status: DISCONTINUED | OUTPATIENT
Start: 2023-09-15 | End: 2023-09-16

## 2023-09-15 RX ADMIN — CHLORHEXIDINE GLUCONATE 15 ML: 1.2 SOLUTION ORAL at 08:37

## 2023-09-15 RX ADMIN — DEXAMETHASONE SODIUM PHOSPHATE 8 MG: 4 INJECTION INTRA-ARTICULAR; INTRALESIONAL; INTRAMUSCULAR; INTRAVENOUS; SOFT TISSUE at 05:54

## 2023-09-15 RX ADMIN — Medication 150 MCG/HR: at 15:55

## 2023-09-15 RX ADMIN — NYSTATIN 500000 UNITS: 100000 SUSPENSION ORAL at 11:39

## 2023-09-15 RX ADMIN — Medication 150 MCG/HR: at 05:54

## 2023-09-15 RX ADMIN — NYSTATIN 500000 UNITS: 100000 SUSPENSION ORAL at 08:37

## 2023-09-15 RX ADMIN — PROPOFOL 40 MCG/KG/MIN: 10 INJECTION, EMULSION INTRAVENOUS at 05:54

## 2023-09-15 RX ADMIN — FAMOTIDINE 20 MG: 10 INJECTION INTRAVENOUS at 08:37

## 2023-09-15 RX ADMIN — AMLODIPINE BESYLATE 10 MG: 5 TABLET ORAL at 18:21

## 2023-09-15 RX ADMIN — SERTRALINE HYDROCHLORIDE 50 MG: 50 TABLET ORAL at 08:37

## 2023-09-15 RX ADMIN — IPRATROPIUM BROMIDE AND ALBUTEROL SULFATE 3 ML: 2.5; .5 SOLUTION RESPIRATORY (INHALATION) at 19:54

## 2023-09-15 RX ADMIN — NYSTATIN 500000 UNITS: 100000 SUSPENSION ORAL at 17:03

## 2023-09-15 RX ADMIN — BUSPIRONE HYDROCHLORIDE 30 MG: 10 TABLET ORAL at 20:15

## 2023-09-15 RX ADMIN — CHLORHEXIDINE GLUCONATE 15 ML: 1.2 SOLUTION ORAL at 20:15

## 2023-09-15 RX ADMIN — Medication 150 MCG/HR: at 21:52

## 2023-09-15 RX ADMIN — PROPOFOL 50 MCG/KG/MIN: 10 INJECTION, EMULSION INTRAVENOUS at 02:00

## 2023-09-15 RX ADMIN — PROPOFOL 45 MCG/KG/MIN: 10 INJECTION, EMULSION INTRAVENOUS at 23:10

## 2023-09-15 RX ADMIN — IPRATROPIUM BROMIDE AND ALBUTEROL SULFATE 3 ML: 2.5; .5 SOLUTION RESPIRATORY (INHALATION) at 13:25

## 2023-09-15 RX ADMIN — PROPOFOL 35 MCG/KG/MIN: 10 INJECTION, EMULSION INTRAVENOUS at 09:15

## 2023-09-15 RX ADMIN — PIPERACILLIN AND TAZOBACTAM 3.38 G: 36; 4.5 INJECTION, POWDER, FOR SOLUTION INTRAVENOUS at 19:36

## 2023-09-15 RX ADMIN — Medication 150 MCG/HR: at 09:15

## 2023-09-15 RX ADMIN — DEXAMETHASONE SODIUM PHOSPHATE 8 MG: 4 INJECTION INTRA-ARTICULAR; INTRALESIONAL; INTRAMUSCULAR; INTRAVENOUS; SOFT TISSUE at 14:09

## 2023-09-15 RX ADMIN — IPRATROPIUM BROMIDE AND ALBUTEROL SULFATE 3 ML: 2.5; .5 SOLUTION RESPIRATORY (INHALATION) at 08:18

## 2023-09-15 RX ADMIN — BUSPIRONE HYDROCHLORIDE 30 MG: 10 TABLET ORAL at 17:00

## 2023-09-15 RX ADMIN — ENOXAPARIN SODIUM 40 MG: 40 INJECTION SUBCUTANEOUS at 11:39

## 2023-09-15 RX ADMIN — VANCOMYCIN HYDROCHLORIDE 1000 MG: 1 INJECTION, SOLUTION INTRAVENOUS at 16:08

## 2023-09-15 RX ADMIN — NYSTATIN 500000 UNITS: 100000 SUSPENSION ORAL at 21:52

## 2023-09-15 RX ADMIN — DIPHENHYDRAMINE HYDROCHLORIDE 25 MG: 50 INJECTION, SOLUTION INTRAMUSCULAR; INTRAVENOUS at 05:54

## 2023-09-15 RX ADMIN — PIPERACILLIN AND TAZOBACTAM 3.38 G: 36; 4.5 INJECTION, POWDER, FOR SOLUTION INTRAVENOUS at 11:39

## 2023-09-15 RX ADMIN — DEXAMETHASONE SODIUM PHOSPHATE 8 MG: 4 INJECTION INTRA-ARTICULAR; INTRALESIONAL; INTRAMUSCULAR; INTRAVENOUS; SOFT TISSUE at 21:52

## 2023-09-15 RX ADMIN — PROPOFOL 45 MCG/KG/MIN: 10 INJECTION, EMULSION INTRAVENOUS at 15:11

## 2023-09-15 RX ADMIN — PROPOFOL 45 MCG/KG/MIN: 10 INJECTION, EMULSION INTRAVENOUS at 19:24

## 2023-09-15 RX ADMIN — IPRATROPIUM BROMIDE AND ALBUTEROL SULFATE 3 ML: 2.5; .5 SOLUTION RESPIRATORY (INHALATION) at 02:36

## 2023-09-15 RX ADMIN — PIPERACILLIN AND TAZOBACTAM 3.38 G: 36; 4.5 INJECTION, POWDER, FOR SOLUTION INTRAVENOUS at 04:40

## 2023-09-15 RX ADMIN — BUSPIRONE HYDROCHLORIDE 30 MG: 10 TABLET ORAL at 08:37

## 2023-09-15 RX ADMIN — HEPARIN SODIUM 5000 UNITS: 5000 INJECTION INTRAVENOUS; SUBCUTANEOUS at 05:54

## 2023-09-15 RX ADMIN — DIPHENHYDRAMINE HYDROCHLORIDE 25 MG: 50 INJECTION, SOLUTION INTRAMUSCULAR; INTRAVENOUS at 00:20

## 2023-09-15 NOTE — PLAN OF CARE
Problem: MOBILITY - ADULT  Goal: Maintain or return to baseline ADL function  Description: INTERVENTIONS:  -  Assess patient's ability to carry out ADLs; assess patient's baseline for ADL function and identify physical deficits which impact ability to perform ADLs (bathing, care of mouth/teeth, toileting, grooming, dressing, etc.)  - Assess/evaluate cause of self-care deficits   - Assess range of motion  - Assess patient's mobility; develop plan if impaired  - Assess patient's need for assistive devices and provide as appropriate  - Encourage maximum independence but intervene and supervise when necessary  - Involve family in performance of ADLs  - Assess for home care needs following discharge   - Consider OT consult to assist with ADL evaluation and planning for discharge  - Provide patient education as appropriate  Outcome: Progressing  Goal: Maintains/Returns to pre admission functional level  Description: INTERVENTIONS:  - Perform BMAT or MOVE assessment daily.   - Set and communicate daily mobility goal to care team and patient/family/caregiver.    - Collaborate with rehabilitation services on mobility goals if consulted  - Out of bed for toileting  - Record patient progress and toleration of activity level   Outcome: Progressing     Problem: Prexisting or High Potential for Compromised Skin Integrity  Goal: Skin integrity is maintained or improved  Description: INTERVENTIONS:  - Identify patients at risk for skin breakdown  - Assess and monitor skin integrity  - Assess and monitor nutrition and hydration status  - Monitor labs   - Assess for incontinence   - Turn and reposition patient  - Assist with mobility/ambulation  - Relieve pressure over bony prominences  - Avoid friction and shearing  - Provide appropriate hygiene as needed including keeping skin clean and dry  - Evaluate need for skin moisturizer/barrier cream  - Collaborate with interdisciplinary team   - Patient/family teaching  - Consider wound care consult   Outcome: Progressing     Problem: SAFETY,RESTRAINT: NV/NON-SELF DESTRUCTIVE BEHAVIOR  Goal: Remains free of harm/injury (restraint for non violent/non self-detsructive behavior)  Description: INTERVENTIONS:  - Instruct patient/family regarding restraint use   - Assess and monitor physiologic and psychological status   - Provide interventions and comfort measures to meet assessed patient needs   - Identify and implement measures to help patient regain control  - Assess readiness for release of restraint   Outcome: Progressing  Goal: Returns to optimal restraint-free functioning  Description: INTERVENTIONS:  - Assess the patient's behavior and symptoms that indicate continued need for restraint  - Identify and implement measures to help patient regain control  - Assess readiness for release of restraint   Outcome: Progressing     Problem: Nutrition/Hydration-ADULT  Goal: Nutrient/Hydration intake appropriate for improving, restoring or maintaining nutritional needs  Description: Monitor and assess patient's nutrition/hydration status for malnutrition. Collaborate with interdisciplinary team and initiate plan and interventions as ordered. Monitor patient's weight and dietary intake as ordered or per policy. Utilize nutrition screening tool and intervene as necessary. Determine patient's food preferences and provide high-protein, high-caloric foods as appropriate.      INTERVENTIONS:  - Monitor oral intake, urinary output, labs, and treatment plans  - Assess nutrition and hydration status and recommend course of action  - Evaluate amount of meals eaten  - Assist patient with eating if necessary   - Allow adequate time for meals  - Recommend/ encourage appropriate diets, oral nutritional supplements, and vitamin/mineral supplements  - Order, calculate, and assess calorie counts as needed  - Recommend, monitor, and adjust tube feedings and TPN/PPN based on assessed needs  - Assess need for intravenous fluids  - Provide specific nutrition/hydration education as appropriate  - Include patient/family/caregiver in decisions related to nutrition  Outcome: Progressing

## 2023-09-15 NOTE — ASSESSMENT & PLAN NOTE
• POA with O2 saturate around 80% on room air. Needed high flow 60 L to bring up her O2 saturation to 95%. • Recent hospitalization for respiratory failure second to pneumonia. • CTA PE study negative for PE. New onset RML PNA. • Persistent partial atelectasis of the lower lobes noted. • Current daily smoker. 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     Plan:  • Continue intubation. • DuoNeb. Decadron. • Airway clearance protocol.  IS.

## 2023-09-15 NOTE — QUICK NOTE
Reached out to [address] Dr. José Luis Araujo regarding pt's possible 1 dose radiation therapy for her airway compromise. Dr. Daisy Reddy would prefer to hold off RT and wait for Trach and MRI to be completed- till pt to be stabilized as well. If no distant malignancy mets, any attempt for RT should ultimately aim for curative treatment.

## 2023-09-15 NOTE — PROGRESS NOTES
OTOLARYNGOLOGY PROGRESS NOTE    Date of Service: 9/15/2023 6:31 AM    HPI  Patient is a 69 y.o. female w/ recent COVID/pneumonia requiring admission for infectious control earlier this yr, recently dc, [facility] on 9/13 due to SOB and hypoxia, w/ limited oral cavity space observed. Pt agreed to intubation for airway management on 9/14. CT imaging demonstrated L naso/oropharyngeal mass w/ some level 3/4 neck LAD, new R middle lobe patchy consolidation. Daughter at bedside answered questions, including pt is an active tobacco user for many yr, and has complained of worsening difficulty w/ breathing, "gum swelling," and discomfort at back of mouth for past 1.5-2wk. Pt's cause of acute difficult airway was attributed to angioedema due to lisinopril, w/ previous "angioedema" episode in 2020. Ddx being cancer vs infectious etiologies. Overnight Events: NAEO, some hypertensive episodes overnight (173/84)    WBC (9/15): 13.33 (8.1), on zosyn & vanc    PHYSICAL EXAM:  Vitals:    09/15/23 0515   BP: (!) 173/84   Pulse: 64   Resp:    Temp: 97.5 °F (36.4 °C)   SpO2: 97%        General: Intubated  HEENT: No neck LAD palpated  Neurology: Cannot assess neurologic fx  Lungs:  On ventilator (SCMV, RR 16, , PEEP 6, O2 40%)  Cardio: RRR, well perfused  Abd: soft, NT, ND       Intake/Output Summary (Last 24 hours) at 9/15/2023 0631  Last data filed at 9/15/2023 0557  Gross per 24 hour   Intake 3848.82 ml   Output 1695 ml   Net 2153.82 ml         LABORATORY    Recent Labs     09/13/23  1158 09/13/23  1654 09/14/23  0641 09/15/23  0519   WBC 8.44  --  8.10 13.33*   HGB 15.6* 14.6 12.2 13.4   HCT 48.7* 43 37.1 41.5     --  237 222       Recent Labs     09/13/23  1158 09/14/23  0530   K 4.0 4.4    109*   BUN 30* 32*   PHOS  --  4.3*   MG  --  2.4       Invalid input(s): "PTPAT", "PTINR", "APTTMNNM", "APTTPAT"      Patient Active Problem List   Diagnosis   • Backache   • Benign essential hypertension   • Bipolar I disorder, single manic episode ([address])   • Disc degeneration, lumbar   • 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   • Hyperlipidemia   • Leg cramps, sleep related   • Lower back pain   • Lumbar radiculopathy   • Myofascial pain syndrome   • Pain of lower leg   • Pain syndrome, chronic   • Osteoarthritis of spine with radiculopathy, lumbar region   • Angio-edema, initial encounter   • Elevated troponin   • Screening for colon cancer   • Abnormal computed tomography angiography (CTA)   • Bipolar disorder, unspecified (HCC)   • Morbid (severe) obesity due to excess calories (HCC)   • Nicotine dependence   • Bone lesion   • Stage 3a chronic kidney disease (HCC)   • Acute pain of right knee   • Acute respiratory failure with hypoxia (HCC)   • Sepsis (HCC)   • (HFpEF) heart failure with preserved ejection fraction (HCC)   • Pulmonary embolism (HCC)   • Vaginal itching   • Ambulatory dysfunction   • Upper airway cough syndrome   • Angioedema   • New onset right middle lobe pneumonia   • PO (acute kidney injury) ([address])         ASSESSMENT  Patient is a 69 y.o. female w/ acute and chronic problems as above, w/ naso/oropharyngeal mass observed on CT, likely inducing diminished airway space, requiring intubation for airway protection, stable on vent    PLAN  - pt unsafe w/o vent support, will need trach for long term care  - will need biopsy for definitive dx of naso/oropharyngeal mass  - will need family discussion regarding plan moving forward before interventions  - rest of care per primary  - ENT continues following

## 2023-09-15 NOTE — ASSESSMENT & PLAN NOTE
· 9/14 Neck/ soft tissue CT: Large enhancing soft tissue mass identified within the left neck involving multiple spaces. This appears to be centered within the left oropharynx with extensions as described above into multiple spaces. This results in significant airway compromise. 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 Small level 3 and level 4 nodes are seen within the neck. · H/o tobacco abuse. · Daughter was updated at bedside by me, attending, ENT resident. Plan:  · ENT consult. Recommendation appreciated. · Continue on vent at this moment. · Biopsy, Additional imaging,Tracheostomy per ENT.

## 2023-09-15 NOTE — ASSESSMENT & PLAN NOTE
Wt Readings from Last 3 Encounters:   09/15/23 78.5 kg (173 lb 1 oz)   09/07/23 74.6 kg (164 lb 6.4 oz)   08/30/23 73.3 kg (161 lb 9.6 oz)     Lab Results   Component Value Date    LVEF 60 08/28/2023     (H) 09/13/2023     (H) 08/25/2023     • Volume status: Euvolemic. • POA physical (limited): JVD-, HJR-, B/L LE trace to 1+ edema. • Echo 8/28/23: LVEF 60%. D1DD.   • Likely not acute exacerbation.     Plan:  • CMP, magnesium tomorrow a.m; Goal Mg > 2 and K > 4; Replete prn  • HOB > 30°, Daily standing weights, Measure I/O

## 2023-09-15 NOTE — NUTRITION
If pt remains intubated recommend initiating tube feeds as able in the next 24 hours. Vital 1.2 @ 40 ml/hr. Water flushes of 30 ml q 4 hrs. Start at 10 ml and advance by 10 ml q 4 hrs until goal rate is reached. At goal TF regimen provides 1152 kcals (1566 total kcals with propofol), 72 g protein, and 959 ml total fluid from formula + flushes. See Nutrition note dated 9/14/2023  for additional details. Completed nutrition assessment is viewable in the nutrition documentation.

## 2023-09-15 NOTE — PROGRESS NOTES
Vancomycin 1g dose ordered for this morning since vancomycin random level < 15.  Vancomycin order was discontinued by provider this morning so no dose was administered. Clarified with Dr. Keara Rivera that vancomycin therapy is to be continued at this time. Re-ordered vancomycin 1g dose for now.

## 2023-09-15 NOTE — ASSESSMENT & PLAN NOTE
Lab Results   Component Value Date    CREATININE 1.06 09/15/2023    CREATININE 1.40 (H) 09/14/2023    CREATININE 1.63 (H) 09/13/2023       Likely prerenal.  Second to poor oral intake versus poor urine output. Baseline creatinine 1 to 1.2. Plan:  • UA w/ microscopic, Urinary retention protocol, Bladder scan, Daily weights, I/O  • IV fluid stopped today. • Monitor BMP daily and observe for downward trend of creatinine  • Avoid hypoperfusion of the kidneys, minimize nephrotoxins  • RAAS Blockers/Diuretics held: Lisinopril.

## 2023-09-15 NOTE — PROGRESS NOTES
Pal Watson is a 69 y.o. female who is currently ordered Vancomycin IV with management by the Pharmacy Consult service. Relevant clinical data and objective / subjective history reviewed. Vancomycin Assessment:  Indication and Goal AUC/Trough: Pneumonia (goal -600, trough >10)  Clinical Status:  critically ill  Micro:     Renal Function:  SCr: 1.4 mg/dL  CrCl:36 mL/min  Renal replacement: Not on dialysis  Days of Therapy: 3  Current Dose: 1000 mg as needed for random vancomycin level < 15  Vancomycin Plan:  New Dosing: continue current dose , level today is 14.1  Next Level: 09/16 at 0600  Renal Function Monitoring: Daily BMP and [place] will continue to follow closely for s/sx of nephrotoxicity, infusion reactions and appropriateness of therapy. BMP and CBC will be ordered per protocol. We will continue to follow the patient’s culture results and clinical progress daily.     Sonya Choudhary, Pharmacist

## 2023-09-15 NOTE — PROGRESS NOTES
[facility]  Progress Note  Name: Mike Ardon  MRN: [id number]  Unit/Bed#: ICU 06 I Date of Admission: 9/13/2023   Date of Service: 9/15/2023 I Hospital Day: 2    Assessment/Plan   * Angioedema  Assessment & Plan  • POA with acute respiratory failure, SOB. • On physical in [place] ([place]) ED: Swollen tongue, swelling soft and hard palate and almost the head of all phalanx is completely closed. Severe angioedema. • Decadron 10 mg, Benadryl 25 mg given. • Initially refused but eventually agreed with intubation. • Prior episode of angioedema in 2020 noted. Suspected coadministration of increase the dose of tramadol and lisinopril. • Per daughter, there is no recent change in medications except Trelegy inhaler, cefdinir. • Etiology: May 2/2 oropharyngeal mass compression. Plan:  • Continue intubation. New onset right middle lobe pneumonia  Assessment & Plan  · 9/13 CT PE study: Patchy consolidation right middle lobe, new from 8/25/2023, compatible with pneumonia. · No leukocytosis, no fever/chills. · Positive for sore throat, cough. · Recent hospitalization for pneumonia noted. · New onset pneumonia after recent hospitalization and antibiotics treatment. · 9/14: Bronchoscopy/ ABL. · MRSA negative. · Preliminary cultures result came back negative so far. Plan:  · Continue Zosyn for broad coverage- suspected HAP. May consider to discontinue based on negative culture results. · Follow-up blood culture. · Follow ABL. Acute respiratory failure with hypoxia (HCC)  Assessment & Plan  • POA with O2 saturate around 80% on room air. Needed high flow 60 L to bring up her O2 saturation to 95%. • Recent hospitalization for respiratory failure second to pneumonia. • CTA PE study negative for PE. New onset RML PNA. • Persistent partial atelectasis of the lower lobes noted. • Current daily smoker.   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     Plan:  • Continue intubation. • DuoNeb. Decadron. • Airway clearance protocol. IS.    PO (acute kidney injury) [facility])  Assessment & Plan  Lab Results   Component Value Date    CREATININE 1.06 09/15/2023    CREATININE 1.40 (H) 09/14/2023    CREATININE 1.63 (H) 09/13/2023       Likely prerenal.  Second to poor oral intake versus poor urine output. Baseline creatinine 1 to 1.2. Plan:  • UA w/ microscopic, Urinary retention protocol, Bladder scan, Daily weights, I/O  • IV fluid stopped today. • Monitor BMP daily and observe for downward trend of creatinine  • Avoid hypoperfusion of the kidneys, minimize nephrotoxins  • RAAS Blockers/Diuretics held: Lisinopril. (HFpEF) heart failure with preserved ejection fraction [facility])  Assessment & Plan  Wt Readings from Last 3 Encounters:   09/15/23 78.5 kg (173 lb 1 oz)   09/07/23 74.6 kg (164 lb 6.4 oz)   08/30/23 73.3 kg (161 lb 9.6 oz)     Lab Results   Component Value Date    LVEF 60 08/28/2023     (H) 09/13/2023     (H) 08/25/2023     • Volume status: Euvolemic. • POA physical (limited): JVD-, HJR-, B/L LE trace to 1+ edema. • Echo 8/28/23: LVEF 60%. D1DD. • Likely not acute exacerbation.     Plan:  • CMP, magnesium tomorrow a.m; Goal Mg > 2 and K > 4; Replete prn  • HOB > 30°, Daily standing weights, Measure I/O    Pain syndrome, chronic  Assessment & Plan  · Home regimen: Oxycodone 5 mg twice daily as needed. Tylenol 650 mg every 8 hours as needed. Gabapentin 600 mg 3 times daily. Medical marijuana. · Per daughter, patient was overusing Tylenol over-the-counter. · Pain mostly from lumbar radiculopathy. · Impaired ambulatory dysfunction second to pain. Plan:  · Will readdress after patient gets extubated. · May consult acute pain service versus palliative care.     Hyperlipidemia  Assessment & Plan  Lab Results   Component Value Date    CHOLESTEROL 203 (H) 01/24/2023    CHOLESTEROL 177 08/11/2022    CHOLESTEROL 184 07/08/2020     Lab Results   Component Value Date    HDL 45 (L) 01/24/2023    HDL 37 (L) 08/11/2022    HDL 44 (L) 07/08/2020     Lab Results   Component Value Date    TRIG 160 (H) 01/24/2023    TRIG 108 08/11/2022    TRIG 113 07/08/2020     Lab Results   Component Value Date    NONHDLC 146 07/12/2018    [address] (H) 04/29/2013     Continue outpatient diet/ lifestyle modification. Benign essential hypertension  Assessment & Plan  Well controlled at home. Home meds: Amlodipine 10 mg daily, Coreg 25 mg twice daily, lisinopril 10 mg daily. BP well controlled in ICU. Currently hold amlodipine, Coreg and lisinopril. Oropharyngeal mass  Assessment & Plan  · 9/14 Neck/ soft tissue CT: Large enhancing soft tissue mass identified within the left neck involving multiple spaces. This appears to be centered within the left oropharynx with extensions as described above into multiple spaces. This results in significant airway compromise. 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 Small level 3 and level 4 nodes are seen within the neck. · H/o tobacco abuse. · Daughter was updated at bedside by me, attending, ENT resident. Plan:  · ENT consult. Recommendation appreciated. · Continue on vent at this moment. · Biopsy, Additional imaging,Tracheostomy per ENT. Ambulatory dysfunction  Assessment & Plan  PT/ OT eval before discharge. ICU Core Measures     Vented Patient  VAP Bundle  VAP bundle ordered     A: Assess, Prevent, and Manage Pain · Has pain been assessed? NA  · Need for changes to pain regimen? NA   B: Both Spontaneous Awakening Trials (SATs) and Spontaneous Breathing Trials (SBTs) · Plan to perform spontaneous awakening trial today? No secondary to airway compression second to oropharyngeal mass. · Plan to perform spontaneous breathing trial today? No secondary to Airway compression second to oropharyngeal mass. · Obvious barriers to extubation?  Yes   C: Choice of Sedation · RASS Goal: -2 Light Sedation or 0 Alert and Calm  · Need for changes to sedation or analgesia regimen? No   D: Delirium · CAM-ICU: Unable to perform secondary to sedation   E: Early Mobility  · Plan for early mobility? Yes   F: Family Engagement · Plan for family engagement today? Yes       Antibiotic Review: Awaiting culture results. Review of Invasive Devices: Slater Plan: Voiding trial after improvement in ambulation         Prophylaxis:  VTE VTE covered by:  heparin (porcine), Subcutaneous, 5,000 Units at 09/15/23 0554       Stress Ulcer  covered byFamotidine (PF) (PEPCID) injection 20 mg [[id number]]       Subjective   HPI/24hr events: CT neck tissue came back showing massive oropharyngeal tumor with airway compromised. Patient was seen and examined at bedside. She was arousable. Her CT scan of the neck came back shows oropharyngeal mass and compressed airway. She will be kept on vent at this moment. ENT was consulted and potential tracheostomy. Patient's daughter was updated at bedside with this finding by me, my attending, ENT resident. Objective                            Vitals I/O      Most Recent Min/Max in 24hrs   Temp 97.5 °F (36.4 °C) Temp  Min: 96.8 °F (36 °C)  Max: 97.5 °F (36.4 °C)   Pulse 64 Pulse  Min: 59  Max: 95   Resp (!) 37 Resp  Min: 0  Max: 37   BP (!) 173/84 BP  Min: 105/58  Max: 173/84   O2 Sat 97 % SpO2  Min: 88 %  Max: 100 %      Intake/Output Summary (Last 24 hours) at 9/15/2023 0650  Last data filed at 9/15/2023 0557  Gross per 24 hour   Intake 3848.82 ml   Output 1695 ml   Net 2153.82 ml         Diet NPO     Invasive Monitoring Physical exam    Physical Exam  Constitutional:       Interventions: She is sedated and intubated. Comments: Physical examination was limited secondary patient was sedated. General: Sedated. Arousable by voice/chest rubbing. Skin: no rashes, no jaundice/pale  Eyes: Closed. ENT: moist lips and mucous membranes.   Swollen lips and swollen tongue. Chest: Intubated. No labored breath. Bilateral upper lobe rhonchi. Right middle lobe rales. Decreased breath sound on left lower lobe. Posterior of lung was not able to be evaluated. CVS:  normal rate, regular rhythm  Abdomen: soft, non-distended. +bowel sounds. Extremities: right lower leg negative edema. Left lower extremity trace to negative edema. : Slater in place. Clear light yellowish urine. Neuro/ Psych: Sedated. Pulmonary:      Effort: She is intubated. Diagnostic Studies      EKG: No new EKG. Imaging: CT neck. CT soft tissue neck w contrast   Final Result by Woo Haynes DO (09/14 1644)      Large enhancing soft tissue mass identified within the left neck involving multiple spaces. This appears to be centered within the left oropharynx with extensions as described above into multiple spaces. This results in significant airway compromise. 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 Small level 3 and level 4 nodes are seen within the neck. I personally discussed this study with ICU resident on 9/14/2023 4:44 PM.            Workstation performed: [id number]              I have personally reviewed pertinent reports.    and I have personally reviewed pertinent films in PACS     Medications:  Scheduled PRN   busPIRone, 30 mg, TID  chlorhexidine, 15 mL, Q12H ASHLEY  dexamethasone, 8 mg, Q8H ASHLEY  diphenhydrAMINE, 25 mg, Q6H  famotidine, 20 mg, Q24H ASHLEY  heparin (porcine), 5,000 Units, Q8H [address]  ipratropium-albuterol, 3 mL, Q6H  nystatin, 500,000 Units, 4x Daily  piperacillin-tazobactam, 3.375 g, Q8H  sertraline, 50 mg, Daily      fentanyl citrate (PF), 50 mcg, Q2H PRN  levalbuterol, 1.25 mg, Q8H PRN  vancomycin, 15 mg/kg (Adjusted), Once PRN       Continuous    fentaNYL, 150 mcg/hr, Last Rate: 150 mcg/hr (09/15/23 0554)  propofol, 5-50 mcg/kg/min, Last Rate: 40 mcg/kg/min (09/15/23 0554)         Labs:    CBC    Recent Labs 09/14/23  0641 09/15/23  0519   WBC 8.10 13.33*   HGB 12.2 13.4   HCT 37.1 41.5    222   BANDSPCT 2  --      BMP    Recent Labs     09/14/23  0530 09/15/23  0519   SODIUM 137 137   K 4.4 4.1   * 109*   CO2 16* 20*   AGAP 12 8   BUN 32* 21   CREATININE 1.40* 1.06   CALCIUM 8.1* 8.3*       Coags    Recent Labs     09/13/23  1158   INR 0.92        Additional Electrolytes  Recent Labs     09/13/23  1654 09/14/23  0530 09/15/23  0519   MG  --  2.4 2.6   PHOS  --  4.3* 3.3   CAIONIZED 1.23  --   --           Blood Gas    Recent Labs     09/13/23  1345   PHART 7.301*   LAD4RVD 48.9*   PO2ART 142.8*   RRK3QIB 23.6   BEART -3.4   SOURCE Radial, Left     Recent Labs     09/13/23  1345 09/14/23  0843   PHVEN  --  7.284*   PTN4VBO  --  40.6*   PO2VEN  --  51.6*   VUB3TLU  --  18.8*   BEVEN  --  -7.4   SOURCE Radial, Left  --     LFTs  Recent Labs     09/13/23  1158 09/14/23  0530   ALT 14 8   AST 16 16   ALKPHOS 59 45   ALB 3.6 2.8*   TBILI 0.54 0.34       Infectious  Recent Labs     09/14/23  0530 09/15/23  0519   PROCALCITONI 0.36* 0.22     Glucose  Recent Labs     09/13/23  1158 09/14/23  0530 09/15/23  0519   GLUC 91 118 134                 Giselle Perry MD

## 2023-09-16 PROBLEM — Z12.11 SCREENING FOR COLON CANCER: Status: RESOLVED | Noted: 2020-07-15 | Resolved: 2023-09-16

## 2023-09-16 PROBLEM — R79.89 ELEVATED TROPONIN: Status: RESOLVED | Noted: 2020-07-10 | Resolved: 2023-09-16

## 2023-09-16 PROBLEM — N18.31 STAGE 3A CHRONIC KIDNEY DISEASE (HCC): Chronic | Status: ACTIVE | Noted: 2021-08-20

## 2023-09-16 PROBLEM — E66.01 MORBID (SEVERE) OBESITY DUE TO EXCESS CALORIES (HCC): Status: RESOLVED | Noted: 2021-06-10 | Resolved: 2023-09-16

## 2023-09-16 PROBLEM — A41.9 SEPSIS (HCC): Status: RESOLVED | Noted: 2023-08-25 | Resolved: 2023-09-16

## 2023-09-16 PROBLEM — I50.30 (HFPEF) HEART FAILURE WITH PRESERVED EJECTION FRACTION (HCC): Chronic | Status: ACTIVE | Noted: 2023-08-25

## 2023-09-16 PROBLEM — F31.9 BIPOLAR DISORDER, UNSPECIFIED (HCC): Chronic | Status: ACTIVE | Noted: 2021-06-10

## 2023-09-16 PROBLEM — R26.2 AMBULATORY DYSFUNCTION: Chronic | Status: ACTIVE | Noted: 2023-08-30

## 2023-09-16 PROBLEM — M25.561 ACUTE PAIN OF RIGHT KNEE: Status: RESOLVED | Noted: 2021-08-30 | Resolved: 2023-09-16

## 2023-09-16 PROBLEM — F17.200 NICOTINE DEPENDENCE: Chronic | Status: ACTIVE | Noted: 2021-06-15

## 2023-09-16 PROBLEM — R77.8 ELEVATED TROPONIN: Status: RESOLVED | Noted: 2020-07-10 | Resolved: 2023-09-16

## 2023-09-16 PROBLEM — R05.8 UPPER AIRWAY COUGH SYNDROME: Status: RESOLVED | Noted: 2023-09-04 | Resolved: 2023-09-16

## 2023-09-16 LAB
ALBUMIN SERPL BCP-MCNC: 2.8 G/DL (ref 3.5–5)
ALP SERPL-CCNC: 39 U/L (ref 34–104)
ALT SERPL W P-5'-P-CCNC: 10 U/L (ref 7–52)
ANION GAP SERPL CALCULATED.3IONS-SCNC: 5 MMOL/L
AST SERPL W P-5'-P-CCNC: 10 U/L (ref 13–39)
BACTERIA BRONCH AEROBE CULT: ABNORMAL
BACTERIA BRONCH AEROBE CULT: ABNORMAL
BASOPHILS # BLD AUTO: 0.01 THOUSANDS/ÂΜL (ref 0–0.1)
BASOPHILS NFR BLD AUTO: 0 % (ref 0–1)
BILIRUB SERPL-MCNC: 0.27 MG/DL (ref 0.2–1)
BUN SERPL-MCNC: 18 MG/DL (ref 5–25)
CALCIUM ALBUM COR SERPL-MCNC: 8.9 MG/DL (ref 8.3–10.1)
CALCIUM SERPL-MCNC: 7.9 MG/DL (ref 8.4–10.2)
CHLORIDE SERPL-SCNC: 111 MMOL/L (ref 96–108)
CO2 SERPL-SCNC: 23 MMOL/L (ref 21–32)
CREAT SERPL-MCNC: 0.89 MG/DL (ref 0.6–1.3)
EOSINOPHIL # BLD AUTO: 0 THOUSAND/ÂΜL (ref 0–0.61)
EOSINOPHIL NFR BLD AUTO: 0 % (ref 0–6)
ERYTHROCYTE [DISTWIDTH] IN BLOOD BY AUTOMATED COUNT: 16.2 % (ref 11.6–15.1)
GFR SERPL CREATININE-BSD FRML MDRD: 66 ML/MIN/1.73SQ M
GLUCOSE SERPL-MCNC: 143 MG/DL (ref 65–140)
GLUCOSE SERPL-MCNC: 150 MG/DL (ref 65–140)
GRAM STN SPEC: ABNORMAL
GRAM STN SPEC: ABNORMAL
HCT VFR BLD AUTO: 41.9 % (ref 34.8–46.1)
HGB BLD-MCNC: 13.6 G/DL (ref 11.5–15.4)
IMM GRANULOCYTES # BLD AUTO: 0.08 THOUSAND/UL (ref 0–0.2)
IMM GRANULOCYTES NFR BLD AUTO: 1 % (ref 0–2)
LYMPHOCYTES # BLD AUTO: 0.16 THOUSANDS/ÂΜL (ref 0.6–4.47)
LYMPHOCYTES NFR BLD AUTO: 1 % (ref 14–44)
MAGNESIUM SERPL-MCNC: 2.4 MG/DL (ref 1.9–2.7)
MCH RBC QN AUTO: 31.1 PG (ref 26.8–34.3)
MCHC RBC AUTO-ENTMCNC: 32.5 G/DL (ref 31.4–37.4)
MCV RBC AUTO: 96 FL (ref 82–98)
MONOCYTES # BLD AUTO: 0.5 THOUSAND/ÂΜL (ref 0.17–1.22)
MONOCYTES NFR BLD AUTO: 4 % (ref 4–12)
NEUTROPHILS # BLD AUTO: 11.31 THOUSANDS/ÂΜL (ref 1.85–7.62)
NEUTS SEG NFR BLD AUTO: 94 % (ref 43–75)
NRBC BLD AUTO-RTO: 0 /100 WBCS
PHOSPHATE SERPL-MCNC: 2.7 MG/DL (ref 2.3–4.1)
PLATELET # BLD AUTO: 205 THOUSANDS/UL (ref 149–390)
PLATELET BLD QL SMEAR: ADEQUATE
PMV BLD AUTO: 10.4 FL (ref 8.9–12.7)
POTASSIUM SERPL-SCNC: 4 MMOL/L (ref 3.5–5.3)
PROT SERPL-MCNC: 5.9 G/DL (ref 6.4–8.4)
RBC # BLD AUTO: 4.38 MILLION/UL (ref 3.81–5.12)
RBC MORPH BLD: NORMAL
SODIUM SERPL-SCNC: 139 MMOL/L (ref 135–147)
TOXIC GRANULES BLD QL SMEAR: PRESENT
TRIGL SERPL-MCNC: 166 MG/DL
VANCOMYCIN SERPL-MCNC: 17.5 UG/ML (ref 10–20)
WBC # BLD AUTO: 12.06 THOUSAND/UL (ref 4.31–10.16)

## 2023-09-16 PROCEDURE — 83735 ASSAY OF MAGNESIUM: CPT | Performed by: NURSE PRACTITIONER

## 2023-09-16 PROCEDURE — 94760 N-INVAS EAR/PLS OXIMETRY 1: CPT

## 2023-09-16 PROCEDURE — 80053 COMPREHEN METABOLIC PANEL: CPT | Performed by: NURSE PRACTITIONER

## 2023-09-16 PROCEDURE — 94003 VENT MGMT INPAT SUBQ DAY: CPT

## 2023-09-16 PROCEDURE — 94640 AIRWAY INHALATION TREATMENT: CPT

## 2023-09-16 PROCEDURE — 84100 ASSAY OF PHOSPHORUS: CPT | Performed by: NURSE PRACTITIONER

## 2023-09-16 PROCEDURE — 82948 REAGENT STRIP/BLOOD GLUCOSE: CPT

## 2023-09-16 PROCEDURE — 99291 CRITICAL CARE FIRST HOUR: CPT | Performed by: INTERNAL MEDICINE

## 2023-09-16 PROCEDURE — 85025 COMPLETE CBC W/AUTO DIFF WBC: CPT | Performed by: NURSE PRACTITIONER

## 2023-09-16 PROCEDURE — 80202 ASSAY OF VANCOMYCIN: CPT | Performed by: STUDENT IN AN ORGANIZED HEALTH CARE EDUCATION/TRAINING PROGRAM

## 2023-09-16 PROCEDURE — 84478 ASSAY OF TRIGLYCERIDES: CPT

## 2023-09-16 PROCEDURE — 94150 VITAL CAPACITY TEST: CPT

## 2023-09-16 RX ORDER — ENOXAPARIN SODIUM 100 MG/ML
40 INJECTION SUBCUTANEOUS
Status: DISCONTINUED | OUTPATIENT
Start: 2023-09-18 | End: 2023-10-04 | Stop reason: DRUGHIGH

## 2023-09-16 RX ORDER — SENNOSIDES 8.8 MG/5ML
8.8 LIQUID ORAL
Status: DISCONTINUED | OUTPATIENT
Start: 2023-09-16 | End: 2023-09-19

## 2023-09-16 RX ORDER — POLYETHYLENE GLYCOL 3350 17 G/17G
17 POWDER, FOR SOLUTION ORAL DAILY PRN
Status: DISCONTINUED | OUTPATIENT
Start: 2023-09-17 | End: 2023-10-27

## 2023-09-16 RX ORDER — NICOTINE 21 MG/24HR
21 PATCH, TRANSDERMAL 24 HOURS TRANSDERMAL DAILY
Status: DISCONTINUED | OUTPATIENT
Start: 2023-09-16 | End: 2023-10-08

## 2023-09-16 RX ORDER — LEVALBUTEROL INHALATION SOLUTION 1.25 MG/3ML
1.25 SOLUTION RESPIRATORY (INHALATION)
Status: DISCONTINUED | OUTPATIENT
Start: 2023-09-16 | End: 2023-10-11

## 2023-09-16 RX ORDER — POLYETHYLENE GLYCOL 3350 17 G/17G
17 POWDER, FOR SOLUTION ORAL ONCE
Status: COMPLETED | OUTPATIENT
Start: 2023-09-16 | End: 2023-09-16

## 2023-09-16 RX ORDER — OXYCODONE HYDROCHLORIDE 5 MG/1
5 TABLET ORAL EVERY 8 HOURS SCHEDULED
Status: DISCONTINUED | OUTPATIENT
Start: 2023-09-16 | End: 2023-09-24

## 2023-09-16 RX ORDER — DOCUSATE SODIUM 100 MG/1
100 CAPSULE, LIQUID FILLED ORAL 2 TIMES DAILY
Status: DISCONTINUED | OUTPATIENT
Start: 2023-09-16 | End: 2023-09-16

## 2023-09-16 RX ORDER — SENNOSIDES 8.6 MG
1 TABLET ORAL
Status: DISCONTINUED | OUTPATIENT
Start: 2023-09-16 | End: 2023-09-16

## 2023-09-16 RX ORDER — GABAPENTIN 300 MG/1
300 CAPSULE ORAL 3 TIMES DAILY
Status: DISCONTINUED | OUTPATIENT
Start: 2023-09-16 | End: 2023-12-04 | Stop reason: HOSPADM

## 2023-09-16 RX ORDER — VANCOMYCIN HYDROCHLORIDE 1 G/200ML
1000 INJECTION, SOLUTION INTRAVENOUS EVERY 24 HOURS
Status: DISCONTINUED | OUTPATIENT
Start: 2023-09-16 | End: 2023-09-16

## 2023-09-16 RX ADMIN — OXYCODONE HYDROCHLORIDE 5 MG: 5 TABLET ORAL at 13:12

## 2023-09-16 RX ADMIN — PROPOFOL 40 MCG/KG/MIN: 10 INJECTION, EMULSION INTRAVENOUS at 08:45

## 2023-09-16 RX ADMIN — PIPERACILLIN AND TAZOBACTAM 3.38 G: 36; 4.5 INJECTION, POWDER, FOR SOLUTION INTRAVENOUS at 11:42

## 2023-09-16 RX ADMIN — AMLODIPINE BESYLATE 10 MG: 5 TABLET ORAL at 10:52

## 2023-09-16 RX ADMIN — Medication 150 MCG/HR: at 23:05

## 2023-09-16 RX ADMIN — PROPOFOL 45 MCG/KG/MIN: 10 INJECTION, EMULSION INTRAVENOUS at 03:54

## 2023-09-16 RX ADMIN — SENNOSIDES 8.8 MG: 8.8 SYRUP ORAL at 13:13

## 2023-09-16 RX ADMIN — IPRATROPIUM BROMIDE AND ALBUTEROL SULFATE 3 ML: 2.5; .5 SOLUTION RESPIRATORY (INHALATION) at 08:33

## 2023-09-16 RX ADMIN — ENOXAPARIN SODIUM 40 MG: 40 INJECTION SUBCUTANEOUS at 08:55

## 2023-09-16 RX ADMIN — NYSTATIN 500000 UNITS: 100000 SUSPENSION ORAL at 13:17

## 2023-09-16 RX ADMIN — BUSPIRONE HYDROCHLORIDE 30 MG: 10 TABLET ORAL at 10:52

## 2023-09-16 RX ADMIN — IPRATROPIUM BROMIDE 0.5 MG: 0.5 SOLUTION RESPIRATORY (INHALATION) at 13:59

## 2023-09-16 RX ADMIN — SERTRALINE HYDROCHLORIDE 50 MG: 50 TABLET ORAL at 10:52

## 2023-09-16 RX ADMIN — Medication 150 MCG/HR: at 04:14

## 2023-09-16 RX ADMIN — PIPERACILLIN AND TAZOBACTAM 3.38 G: 36; 4.5 INJECTION, POWDER, FOR SOLUTION INTRAVENOUS at 21:43

## 2023-09-16 RX ADMIN — Medication 150 MCG/HR: at 17:13

## 2023-09-16 RX ADMIN — GABAPENTIN 300 MG: 300 CAPSULE ORAL at 15:46

## 2023-09-16 RX ADMIN — CHLORHEXIDINE GLUCONATE 15 ML: 1.2 SOLUTION ORAL at 10:39

## 2023-09-16 RX ADMIN — FAMOTIDINE 20 MG: 10 INJECTION INTRAVENOUS at 09:09

## 2023-09-16 RX ADMIN — DEXAMETHASONE SODIUM PHOSPHATE 8 MG: 4 INJECTION INTRA-ARTICULAR; INTRALESIONAL; INTRAMUSCULAR; INTRAVENOUS; SOFT TISSUE at 06:14

## 2023-09-16 RX ADMIN — Medication 150 MCG/HR: at 10:46

## 2023-09-16 RX ADMIN — CHLORHEXIDINE GLUCONATE 15 ML: 1.2 SOLUTION ORAL at 22:41

## 2023-09-16 RX ADMIN — LEVALBUTEROL HYDROCHLORIDE 1.25 MG: 1.25 SOLUTION RESPIRATORY (INHALATION) at 20:08

## 2023-09-16 RX ADMIN — NYSTATIN 500000 UNITS: 100000 SUSPENSION ORAL at 10:39

## 2023-09-16 RX ADMIN — BUSPIRONE HYDROCHLORIDE 30 MG: 10 TABLET ORAL at 22:59

## 2023-09-16 RX ADMIN — PROPOFOL 30 MCG/KG/MIN: 10 INJECTION, EMULSION INTRAVENOUS at 15:37

## 2023-09-16 RX ADMIN — OXYCODONE HYDROCHLORIDE 5 MG: 5 TABLET ORAL at 22:43

## 2023-09-16 RX ADMIN — PROPOFOL 40 MCG/KG/MIN: 10 INJECTION, EMULSION INTRAVENOUS at 10:48

## 2023-09-16 RX ADMIN — NICOTINE 21 MG: 21 PATCH, EXTENDED RELEASE TRANSDERMAL at 10:49

## 2023-09-16 RX ADMIN — LEVALBUTEROL HYDROCHLORIDE 1.25 MG: 1.25 SOLUTION RESPIRATORY (INHALATION) at 14:00

## 2023-09-16 RX ADMIN — POLYETHYLENE GLYCOL 3350 17 G: 17 POWDER, FOR SOLUTION ORAL at 10:53

## 2023-09-16 RX ADMIN — PROPOFOL 30 MCG/KG/MIN: 10 INJECTION, EMULSION INTRAVENOUS at 23:07

## 2023-09-16 RX ADMIN — PIPERACILLIN AND TAZOBACTAM 3.38 G: 36; 4.5 INJECTION, POWDER, FOR SOLUTION INTRAVENOUS at 03:54

## 2023-09-16 RX ADMIN — NYSTATIN 500000 UNITS: 100000 SUSPENSION ORAL at 17:15

## 2023-09-16 RX ADMIN — BUSPIRONE HYDROCHLORIDE 30 MG: 10 TABLET ORAL at 15:46

## 2023-09-16 RX ADMIN — IPRATROPIUM BROMIDE 0.5 MG: 0.5 SOLUTION RESPIRATORY (INHALATION) at 20:08

## 2023-09-16 RX ADMIN — GABAPENTIN 300 MG: 300 CAPSULE ORAL at 10:52

## 2023-09-16 RX ADMIN — GABAPENTIN 300 MG: 300 CAPSULE ORAL at 22:42

## 2023-09-16 RX ADMIN — IPRATROPIUM BROMIDE AND ALBUTEROL SULFATE 3 ML: 2.5; .5 SOLUTION RESPIRATORY (INHALATION) at 04:10

## 2023-09-16 RX ADMIN — NYSTATIN 500000 UNITS: 100000 SUSPENSION ORAL at 22:42

## 2023-09-16 NOTE — ASSESSMENT & PLAN NOTE
• POA with acute respiratory failure, SOB. • On physical in [place] ([place]) ED: Swollen tongue, swelling soft and hard palate and almost the head of all phalanx is completely closed. Severe angioedema. • Decadron 10 mg, Benadryl 25 mg given. • Initially refused but eventually agreed with intubation. • Prior episode of angioedema in 2020 noted. Suspected coadministration of increase the dose of tramadol and lisinopril. • Per daughter, there is no recent change in medications except Trelegy inhaler, cefdinir. • Etiology: maybe 2/2 oropharyngeal mass compression. Plan:  • Continue intubation.

## 2023-09-16 NOTE — PROGRESS NOTES
[address] [facility]  Progress Note  Name: Greta Amador  MRN: [id number]  Unit/Bed#: ICU 06 I Date of Admission: 9/13/2023   Date of Service: 9/16/2023 I Hospital Day: 3    Assessment/Plan   * Angioedema  Assessment & Plan  • POA with acute respiratory failure, SOB. • On physical in [place] ([place]) ED: Swollen tongue, swelling soft and hard palate and almost the head of all phalanx is completely closed. Severe angioedema. • Decadron 10 mg, Benadryl 25 mg given. • Initially refused but eventually agreed with intubation. • Prior episode of angioedema in 2020 noted. Suspected coadministration of increase the dose of tramadol and lisinopril. • Per daughter, there is no recent change in medications except Trelegy inhaler, cefdinir. • Etiology: May 2/2 oropharyngeal mass compression. Plan:  • Continue intubation. New onset right middle lobe pneumonia  Assessment & Plan  · 9/13 CT PE study: Patchy consolidation right middle lobe, new from 8/25/2023, compatible with pneumonia. · No leukocytosis, no fever/chills. · Positive for sore throat, cough. · Recent hospitalization for pneumonia noted. · New onset pneumonia after recent hospitalization and antibiotics treatment. · 9/14: Bronchoscopy/ ABL. · MRSA negative. · Preliminary cultures result came back negative so far. Plan:  · Continue Zosyn for broad coverage- suspected HAP. May consider to discontinue based on negative culture results. · Follow-up blood culture. · Follow ABL. Acute respiratory failure with hypoxia (HCC)  Assessment & Plan  • POA with O2 saturate around 80% on room air. Needed high flow 60 L to bring up her O2 saturation to 95%. • Recent hospitalization for respiratory failure second to pneumonia. • CTA PE study negative for PE. New onset RML PNA. • Persistent partial atelectasis of the lower lobes noted. • Current daily smoker.   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     Plan:  • Continue intubation before tracheostomy. • Completed Decadron. • DuoNeb. • Airway clearance protocol. IS.    PO (acute kidney injury) [facility])  Assessment & Plan  Lab Results   Component Value Date    CREATININE 0.89 09/16/2023    CREATININE 1.06 09/15/2023    CREATININE 1.40 (H) 09/14/2023       Likely prerenal.  Second to poor oral intake versus poor urine output. Baseline creatinine 1 to 1.2. Plan:  • Currently resolved. • UA w/ microscopic, Urinary retention protocol, Bladder scan, Daily weights, I/O  • IV fluid stopped today. • Monitor BMP daily and observe for downward trend of creatinine  • Avoid hypoperfusion of the kidneys, minimize nephrotoxins  • RAAS Blockers/Diuretics held: Lisinopril. Oropharyngeal mass  Assessment & Plan  · 9/14 Neck/ soft tissue CT: Large enhancing soft tissue mass identified within the left neck involving multiple spaces. This appears to be centered within the left oropharynx with extensions as described above into multiple spaces. This results in significant airway compromise. 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 Small level 3 and level 4 nodes are seen within the neck. · H/o tobacco abuse. · Daughter was updated at bedside by me, attending, ENT resident. Plan:  · ENT consult. Recommendation appreciated. · Continue on vent at this moment. · Biopsy, Additional imaging,Tracheostomy per ENT. Potentially this weekend or Monday. (HFpEF) heart failure with preserved ejection fraction [facility])  Assessment & Plan  Wt Readings from Last 3 Encounters:   09/16/23 78.7 kg (173 lb 8 oz)   09/07/23 74.6 kg (164 lb 6.4 oz)   08/30/23 73.3 kg (161 lb 9.6 oz)     Lab Results   Component Value Date    LVEF 60 08/28/2023     (H) 09/13/2023     (H) 08/25/2023     • Volume status: Euvolemic. • POA physical (limited): JVD-, HJR-, B/L LE trace to 1+ edema. • Echo 8/28/23: LVEF 60%. D1DD.   • Likely not acute exacerbation.     Plan:  • CMP, magnesium tomorrow a.m; Goal Mg > 2 and K > 4; Replete prn  • HOB > 30°, Daily standing weights, Measure I/O    Ambulatory dysfunction  Assessment & Plan  PT/ OT eval before discharge. Pain syndrome, chronic  Assessment & Plan  · Home regimen: Oxycodone 5 mg twice daily as needed. Tylenol 650 mg every 8 hours as needed. Gabapentin 600 mg 3 times daily. Medical marijuana. · Per daughter, patient was overusing Tylenol over-the-counter. · Pain mostly from lumbar radiculopathy. · Impaired ambulatory dysfunction second to pain. Plan:  · Will readdress after patient gets extubated. · May consult acute pain service versus palliative care. Hyperlipidemia  Assessment & Plan  Lab Results   Component Value Date    CHOLESTEROL 203 (H) 01/24/2023    CHOLESTEROL 177 08/11/2022    CHOLESTEROL 184 07/08/2020     Lab Results   Component Value Date    HDL 45 (L) 01/24/2023    HDL 37 (L) 08/11/2022    HDL 44 (L) 07/08/2020     Lab Results   Component Value Date    TRIG 166 (H) 09/16/2023    TRIG 160 (H) 01/24/2023    TRIG 108 08/11/2022     Lab Results   Component Value Date    NONHDLC 146 07/12/2018    [address] (H) 04/29/2013     Continue outpatient diet/ lifestyle modification. Benign essential hypertension  Assessment & Plan  Well controlled at home. Home meds: Amlodipine 10 mg daily, Coreg 25 mg twice daily, lisinopril 10 mg daily. Currently on amlodipine 10 mg daily with well controlled blood pressure. ICU Core Measures     Vented Patient  VAP Bundle  VAP bundle ordered     A: Assess, Prevent, and Manage Pain · Has pain been assessed? NA  · Need for changes to pain regimen? No   B: Both Spontaneous Awakening Trials (SATs) and Spontaneous Breathing Trials (SBTs) · Plan to perform spontaneous awakening trial today? No secondary to Oropharyngeal mass with airway compromise  · Plan to perform spontaneous breathing trial today?  No secondary to Oropharyngeal mass with airway compromise. · Obvious barriers to extubation? Yes   C: Choice of Sedation · RASS Goal: -2 Light Sedation  · Need for changes to sedation or analgesia regimen? No   D: Delirium · CAM-ICU: Unable to perform secondary to sedation   E: Early Mobility  · Plan for early mobility? Yes   F: Family Engagement · Plan for family engagement today? Yes       Antibiotic Review: Patient on appropriate coverage based on culture data. Review of Invasive Devices: Slater Plan: Voiding trial after improvement in ambulation         Prophylaxis:  VTE VTE covered by:  enoxaparin, Subcutaneous, 40 mg at 09/15/23 1139       Stress Ulcer  covered byFamotidine (PF) (PEPCID) injection 20 mg [[id number]]       Subjective   HPI/24hr events: No acute 24-hour events. Patient was seen and examined at bedside. She was on fentanyl and propofol drip for her vent. Discussed with ENT attending yesterday p.m. along with patient's daughter with full detailed Next-step discussion. Potential tracheostomy per ENT during this weekend or next Monday. MRI/biopsy per ENT as well. Spoke with rad onc attending yesterday for urgent radiation treatment for airway compromise- recommended to defer after tracheostomy and MRI. Objective                            Vitals I/O      Most Recent Min/Max in 24hrs   Temp 98.2 °F (36.8 °C) Temp  Min: 97.3 °F (36.3 °C)  Max: 98.2 °F (36.8 °C)   Pulse 63 Pulse  Min: 57  Max: 73   Resp 16 Resp  Min: 16  Max: 17   /74 BP  Min: 140/74  Max: 179/82   O2 Sat 94 % SpO2  Min: 89 %  Max: 98 %      Intake/Output Summary (Last 24 hours) at 9/16/2023 0706  Last data filed at 9/16/2023 0600  Gross per 24 hour   Intake 2165.35 ml   Output 1475 ml   Net 690.35 ml         Diet Enteral/Parenteral; Tube Feeding No Oral Diet; Vital AF 1.2; Continuous; 40; 100; Water; Every 6 hours     Invasive Monitoring Physical exam    Physical Exam  Constitutional:       Interventions: She is sedated and intubated.       Comments: Physical examination was limited secondary patient was sedated. General: Sedated. Arousable by voice/chest rubbing. Skin: no rashes, no jaundice/pale  Eyes: Closed. ENT: moist lips and mucous membranes. Swollen lips and swollen tongue. Chest: Intubated. No labored breath. Bilateral lung rales. Posterior of lung was not able to be evaluated. CVS:  normal rate, regular rhythm  Abdomen: soft, non-distended. +bowel sounds. Extremities: B/L LE trace edema. : Slater in place. Clear light yellowish urine. Neuro/ Psych: Sedated. Pulmonary:      Effort: She is intubated. Diagnostic Studies      EKG: No new EKG. Imaging: No new imaging. I have personally reviewed pertinent reports.    and I have personally reviewed pertinent films in PACS     Medications:  Scheduled PRN   amLODIPine, 10 mg, Daily  busPIRone, 30 mg, TID  chlorhexidine, 15 mL, Q12H SAHLEY  dexamethasone, 8 mg, Q8H ASHLEY  enoxaparin, 40 mg, Q24H ASHLEY  famotidine, 20 mg, Q24H [address]  ipratropium-albuterol, 3 mL, Q6H  nystatin, 500,000 Units, 4x Daily  piperacillin-tazobactam, 3.375 g, Q8H  sertraline, 50 mg, Daily      fentanyl citrate (PF), 50 mcg, Q2H PRN  levalbuterol, 1.25 mg, Q8H PRN       Continuous    fentaNYL, 150 mcg/hr, Last Rate: 150 mcg/hr (09/16/23 0414)  propofol, 5-50 mcg/kg/min, Last Rate: 45 mcg/kg/min (09/16/23 0354)         Labs:    CBC    Recent Labs     09/15/23  0519 09/16/23  0545   WBC 13.33* 12.06*   HGB 13.4 13.6   HCT 41.5 41.9    205     BMP    Recent Labs     09/15/23  0519 09/16/23  0545   SODIUM 137 139   K 4.1 4.0   * 111*   CO2 20* 23   AGAP 8 5   BUN 21 18   CREATININE 1.06 0.89   CALCIUM 8.3* 7.9*       Coags    No recent results     Additional Electrolytes  Recent Labs     09/15/23  0519 09/16/23  0545   MG 2.6 2.4   PHOS 3.3 2.7          Blood Gas    No recent results  Recent Labs     09/14/23  0843   PHVEN 7.284*   MME9QVQ 40.6*   PO2VEN 51.6*   FOV2WMA 18.8*   BEVEN -7.4    LFTs  Recent Labs     09/16/23  0545   ALT 10   AST 10*   ALKPHOS 39   ALB 2.8*   TBILI 0.27       Infectious  Recent Labs     09/15/23  0519   PROCALCITONI 0.22     Glucose  Recent Labs     09/15/23  0519 09/16/23  0545   GLUC 134 143*               Anastasiya Jo MD

## 2023-09-16 NOTE — ASSESSMENT & PLAN NOTE
• POA with O2 saturate around 80% on room air. Needed high flow 60 L to bring up her O2 saturation to 95%. • Recent hospitalization for respiratory failure second to pneumonia. • CTA PE study negative for PE. New onset RML PNA. • Persistent partial atelectasis of the lower lobes noted. • Current daily smoker. 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     Plan:  • Continue intubation before tracheostomy. • Completed Decadron. • DuoNeb. • Airway clearance protocol.  IS.

## 2023-09-16 NOTE — ASSESSMENT & PLAN NOTE
· 9/14 Neck/ soft tissue CT: Large enhancing soft tissue mass identified within the left neck involving multiple spaces. This appears to be centered within the left oropharynx with extensions as described above into multiple spaces. This results in significant airway compromise. 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 Small level 3 and level 4 nodes are seen within the neck. · H/o tobacco abuse. · Daughter was updated at bedside by me, attending, ENT resident. Plan:  · ENT consult. Recommendation appreciated. · Continue on vent at this moment. · Biopsy, Additional imaging,Tracheostomy per ENT. Potentially this weekend or Monday.

## 2023-09-16 NOTE — PLAN OF CARE
Problem: MOBILITY - ADULT  Goal: Maintain or return to baseline ADL function  Description: INTERVENTIONS:  -  Assess patient's ability to carry out ADLs; assess patient's baseline for ADL function and identify physical deficits which impact ability to perform ADLs (bathing, care of mouth/teeth, toileting, grooming, dressing, etc.)  - Assess/evaluate cause of self-care deficits   - Assess range of motion  - Assess patient's mobility; develop plan if impaired  - Assess patient's need for assistive devices and provide as appropriate  - Encourage maximum independence but intervene and supervise when necessary  - Involve family in performance of ADLs  - Assess for home care needs following discharge   - Consider OT consult to assist with ADL evaluation and planning for discharge  - Provide patient education as appropriate  Outcome: Progressing  Goal: Maintains/Returns to pre admission functional level  Description: INTERVENTIONS:  - Perform BMAT or MOVE assessment daily.   - Set and communicate daily mobility goal to care team and patient/family/caregiver. - Collaborate with rehabilitation services on mobility goals if consulted  - Perform Range of Motion 3 times a day. - Reposition patient every 2 hours.   - Dangle patient 3 times a day  - Stand patient 3 times a day  - Ambulate patient 3 times a day  - Out of bed to chair 3 times a day   - Out of bed for meals 3 times a day  - Out of bed for toileting  - Record patient progress and toleration of activity level   Outcome: Progressing     Problem: Prexisting or High Potential for Compromised Skin Integrity  Goal: Skin integrity is maintained or improved  Description: INTERVENTIONS:  - Identify patients at risk for skin breakdown  - Assess and monitor skin integrity  - Assess and monitor nutrition and hydration status  - Monitor labs   - Assess for incontinence   - Turn and reposition patient  - Assist with mobility/ambulation  - Relieve pressure over bony prominences  - Avoid friction and shearing  - Provide appropriate hygiene as needed including keeping skin clean and dry  - Evaluate need for skin moisturizer/barrier cream  - Collaborate with interdisciplinary team   - Patient/family teaching  - Consider wound care consult   Outcome: Progressing     Problem: SAFETY,RESTRAINT: NV/NON-SELF DESTRUCTIVE BEHAVIOR  Goal: Remains free of harm/injury (restraint for non violent/non self-detsructive behavior)  Description: INTERVENTIONS:  - Instruct patient/family regarding restraint use   - Assess and monitor physiologic and psychological status   - Provide interventions and comfort measures to meet assessed patient needs   - Identify and implement measures to help patient regain control  - Assess readiness for release of restraint   Outcome: Progressing  Goal: Returns to optimal restraint-free functioning  Description: INTERVENTIONS:  - Assess the patient's behavior and symptoms that indicate continued need for restraint  - Identify and implement measures to help patient regain control  - Assess readiness for release of restraint   Outcome: Progressing     Problem: Nutrition/Hydration-ADULT  Goal: Nutrient/Hydration intake appropriate for improving, restoring or maintaining nutritional needs  Description: Monitor and assess patient's nutrition/hydration status for malnutrition. Collaborate with interdisciplinary team and initiate plan and interventions as ordered. Monitor patient's weight and dietary intake as ordered or per policy. Utilize nutrition screening tool and intervene as necessary. Determine patient's food preferences and provide high-protein, high-caloric foods as appropriate.      INTERVENTIONS:  - Monitor oral intake, urinary output, labs, and treatment plans  - Assess nutrition and hydration status and recommend course of action  - Evaluate amount of meals eaten  - Assist patient with eating if necessary   - Allow adequate time for meals  - Recommend/ encourage appropriate diets, oral nutritional supplements, and vitamin/mineral supplements  - Order, calculate, and assess calorie counts as needed  - Recommend, monitor, and adjust tube feedings and TPN/PPN based on assessed needs  - Assess need for intravenous fluids  - Provide specific nutrition/hydration education as appropriate  - Include patient/family/caregiver in decisions related to nutrition  Outcome: Progressing

## 2023-09-16 NOTE — ASSESSMENT & PLAN NOTE
Lab Results   Component Value Date    CHOLESTEROL 203 (H) 01/24/2023    CHOLESTEROL 177 08/11/2022    CHOLESTEROL 184 07/08/2020     Lab Results   Component Value Date    HDL 45 (L) 01/24/2023    HDL 37 (L) 08/11/2022    HDL 44 (L) 07/08/2020     Lab Results   Component Value Date    TRIG 166 (H) 09/16/2023    TRIG 160 (H) 01/24/2023    TRIG 108 08/11/2022     Lab Results   Component Value Date    NONHDLC 146 07/12/2018    [address] [facility] 207 (H) 04/29/2013     Continue outpatient diet/ lifestyle modification.

## 2023-09-16 NOTE — PROGRESS NOTES
Rocky Downing is a 69 y.o. female who is currently ordered Vancomycin IV with management by the Pharmacy Consult service. Relevant clinical data and objective / subjective history reviewed. Vancomycin Assessment:  Indication and Goal AUC/Trough: Pneumonia (goal -600, trough >10)  Clinical Status: critical care  Micro:     Renal Function:  SCr: 1.06 mg/dL  CrCl: 47.6 mL/min  Renal replacement: not on dialysis  Days of Therapy: 4  Current Dose: 1000 mg as needed for random vancomycin level < 15  Vancomycin Plan:  New Dosin mg IV q24h  Estimated AUC: 422 mcg*hr/mL  Estimated Trough: 13 mcg/mL  Next Level:  at 0600  Renal Function Monitoring: Daily BMP and [place] will continue to follow closely for s/sx of nephrotoxicity, infusion reactions and appropriateness of therapy. BMP and CBC will be ordered per protocol. We will continue to follow the patient’s culture results and clinical progress daily.     Isrrael Evans, Pharmacist

## 2023-09-16 NOTE — ASSESSMENT & PLAN NOTE
· Home regimen: Oxycodone 5 mg twice daily as needed. Tylenol 650 mg every 8 hours as needed. Gabapentin 600 mg 3 times daily. Medical marijuana. · Per daughter, patient was overusing Tylenol over-the-counter. · Pain mostly from lumbar radiculopathy. · Impaired ambulatory dysfunction second to pain. Plan:  · Restarted gabapentin 300 3 times daily, scheduled oxycodone 5 mg 3 times daily.

## 2023-09-16 NOTE — ASSESSMENT & PLAN NOTE
Lab Results   Component Value Date    CHOLESTEROL 203 (H) 01/24/2023    CHOLESTEROL 177 08/11/2022    CHOLESTEROL 184 07/08/2020     Lab Results   Component Value Date    HDL 45 (L) 01/24/2023    HDL 37 (L) 08/11/2022    HDL 44 (L) 07/08/2020     Lab Results   Component Value Date    TRIG 166 (H) 09/16/2023    TRIG 160 (H) 01/24/2023    TRIG 108 08/11/2022     Lab Results   Component Value Date    NONHDLC 146 07/12/2018    [address] [facility] 207 (H) 04/29/2013     Continue outpatient diet/ lifestyle modification.

## 2023-09-16 NOTE — ASSESSMENT & PLAN NOTE
Well controlled at home. Home meds: Amlodipine 10 mg daily, Coreg 25 mg twice daily, lisinopril 10 mg daily. Currently on amlodipine 10 mg daily with well controlled blood pressure.

## 2023-09-16 NOTE — ASSESSMENT & PLAN NOTE
Lab Results   Component Value Date    CREATININE 0.89 09/16/2023    CREATININE 1.06 09/15/2023    CREATININE 1.40 (H) 09/14/2023       Likely prerenal.  Second to poor oral intake versus poor urine output. Baseline creatinine 1 to 1.2. Plan:  • Currently resolved. • UA w/ microscopic, Urinary retention protocol, Bladder scan, Daily weights, I/O  • IV fluid stopped today. • Monitor BMP daily and observe for downward trend of creatinine  • Avoid hypoperfusion of the kidneys, minimize nephrotoxins  • RAAS Blockers/Diuretics held: Lisinopril.

## 2023-09-16 NOTE — ASSESSMENT & PLAN NOTE
• POA with O2 saturate around 80% on room air. Needed high flow 60 L to bring up her O2 saturation to 95%. • Recent hospitalization for respiratory failure second to pneumonia. • CTA PE study negative for PE. New onset RML PNA. • Persistent partial atelectasis of the lower lobes noted. • Current daily smoker. 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     Plan:  • Continue intubation before tracheostomy, scheduled 9/17. • Completed Decadron. • DuoNeb. • Airway clearance protocol.  IS.

## 2023-09-16 NOTE — ASSESSMENT & PLAN NOTE
• POA with acute respiratory failure, SOB. • On physical in [place] [facility] ED: Swollen tongue, swelling soft and hard palate and almost the head of all phalanx is completely closed. Severe angioedema. • Decadron 10 mg, Benadryl 25 mg given. • Initially refused but eventually agreed with intubation. • Prior episode of angioedema in 2020 noted. Suspected coadministration of increase the dose of tramadol and lisinopril. • Per daughter, there is no recent change in medications except Trelegy inhaler, cefdinir. • Etiology: May 2/2 oropharyngeal mass compression. Plan:  • Continue intubation.

## 2023-09-16 NOTE — ASSESSMENT & PLAN NOTE
· 9/14 Neck/ soft tissue CT: Large enhancing soft tissue mass identified within the left neck involving multiple spaces. This appears to be centered within the left oropharynx with extensions as described above into multiple spaces. This results in significant airway compromise. 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 Small level 3 and level 4 nodes are seen within the neck. · H/o tobacco abuse.   · Daughter was updated at bedside    Plan:  · ENT following, appreciated  · Continue on vent at this moment  · Biopsy, Additional imaging,Tracheostomy per ENT 9/17

## 2023-09-16 NOTE — ASSESSMENT & PLAN NOTE
Wt Readings from Last 3 Encounters:   09/16/23 78.7 kg (173 lb 8 oz)   09/07/23 74.6 kg (164 lb 6.4 oz)   08/30/23 73.3 kg (161 lb 9.6 oz)     Lab Results   Component Value Date    LVEF 60 08/28/2023     (H) 09/13/2023     (H) 08/25/2023     • Volume status: Euvolemic. • POA physical (limited): JVD-, HJR-, B/L LE trace to 1+ edema. • Echo 8/28/23: LVEF 60%. D1DD.     Plan:  • CMP, magnesium; Goal Mg > 2 and K > 4; Replete prn  • HOB > 30°, Daily standing weights, Measure I/O  • Lasix 20 mg challenge if BP stable.

## 2023-09-16 NOTE — ASSESSMENT & PLAN NOTE
Wt Readings from Last 3 Encounters:   09/16/23 78.7 kg (173 lb 8 oz)   09/07/23 74.6 kg (164 lb 6.4 oz)   08/30/23 73.3 kg (161 lb 9.6 oz)     Lab Results   Component Value Date    LVEF 60 08/28/2023     (H) 09/13/2023     (H) 08/25/2023     • Volume status: Euvolemic. • POA physical (limited): JVD-, HJR-, B/L LE trace to 1+ edema. • Echo 8/28/23: LVEF 60%. D1DD.   • Likely not acute exacerbation.     Plan:  • CMP, magnesium tomorrow a.m; Goal Mg > 2 and K > 4; Replete prn  • HOB > 30°, Daily standing weights, Measure I/O

## 2023-09-16 NOTE — ASSESSMENT & PLAN NOTE
· 9/13 CT PE study: Patchy consolidation right middle lobe, new from 8/25/2023, compatible with pneumonia. · No leukocytosis, no fever/chills. · Positive for sore throat, cough. · Recent hospitalization for pneumonia noted. · New onset pneumonia after recent hospitalization and antibiotics treatment. · 9/14: Bronchoscopy/ ABL. · MRSA negative. · Preliminary cultures result came back negative so far. Plan:  · Continue Zosyn for broad coverage, may discontinue based on negative culture results  · Blood cultures neg 72 hr.  · Follow ABL.

## 2023-09-16 NOTE — PROGRESS NOTES
Vancomycin IV Pharmacy-to-Dose Consultation    Leeanna Powell is a 69 y.o. female who was receiving Vancomycin IV with management by the Pharmacy Consult service for treatment of Pneumonia (goal -600, trough >10)  . The patient's Vancomycin therapy has been completed / discontinued. Thank you for allowing us to take part in this patient's care. Pharmacy will sign-off now; please call or re-consult if there are any questions.         Liberty De Leon, Pharmacist

## 2023-09-17 ENCOUNTER — ANESTHESIA (INPATIENT)
Dept: PERIOP | Facility: HOSPITAL | Age: 68
End: 2023-09-17
Payer: COMMERCIAL

## 2023-09-17 ENCOUNTER — APPOINTMENT (INPATIENT)
Dept: RADIOLOGY | Facility: HOSPITAL | Age: 68
DRG: 004 | End: 2023-09-17
Payer: COMMERCIAL

## 2023-09-17 ENCOUNTER — ANESTHESIA EVENT (INPATIENT)
Dept: PERIOP | Facility: HOSPITAL | Age: 68
End: 2023-09-17
Payer: COMMERCIAL

## 2023-09-17 PROBLEM — S50.822S: Status: ACTIVE | Noted: 2023-09-17

## 2023-09-17 LAB
ANION GAP SERPL CALCULATED.3IONS-SCNC: 12 MMOL/L
BACTERIA BLD CULT: NORMAL
BACTERIA BLD CULT: NORMAL
BUN SERPL-MCNC: 21 MG/DL (ref 5–25)
CALCIUM SERPL-MCNC: 7.4 MG/DL (ref 8.4–10.2)
CHLORIDE SERPL-SCNC: 105 MMOL/L (ref 96–108)
CO2 SERPL-SCNC: 15 MMOL/L (ref 21–32)
CREAT SERPL-MCNC: 0.83 MG/DL (ref 0.6–1.3)
ERYTHROCYTE [DISTWIDTH] IN BLOOD BY AUTOMATED COUNT: 16.2 % (ref 11.6–15.1)
GFR SERPL CREATININE-BSD FRML MDRD: 72 ML/MIN/1.73SQ M
GLUCOSE SERPL-MCNC: 86 MG/DL (ref 65–140)
HCT VFR BLD AUTO: 36.8 % (ref 34.8–46.1)
HGB BLD-MCNC: 12.7 G/DL (ref 11.5–15.4)
MAGNESIUM SERPL-MCNC: 2.2 MG/DL (ref 1.9–2.7)
MCH RBC QN AUTO: 33.5 PG (ref 26.8–34.3)
MCHC RBC AUTO-ENTMCNC: 34.5 G/DL (ref 31.4–37.4)
MCV RBC AUTO: 97 FL (ref 82–98)
PHOSPHATE SERPL-MCNC: 2.2 MG/DL (ref 2.3–4.1)
PLATELET # BLD AUTO: 167 THOUSANDS/UL (ref 149–390)
PMV BLD AUTO: 9.8 FL (ref 8.9–12.7)
POTASSIUM SERPL-SCNC: 4.2 MMOL/L (ref 3.5–5.3)
RBC # BLD AUTO: 3.79 MILLION/UL (ref 3.81–5.12)
SODIUM SERPL-SCNC: 132 MMOL/L (ref 135–147)
WBC # BLD AUTO: 10.91 THOUSAND/UL (ref 4.31–10.16)

## 2023-09-17 PROCEDURE — 80048 BASIC METABOLIC PNL TOTAL CA: CPT

## 2023-09-17 PROCEDURE — 09BN0ZX EXCISION OF NASOPHARYNX, OPEN APPROACH, DIAGNOSTIC: ICD-10-PCS | Performed by: OTOLARYNGOLOGY

## 2023-09-17 PROCEDURE — 88185 FLOWCYTOMETRY/TC ADD-ON: CPT

## 2023-09-17 PROCEDURE — 85027 COMPLETE CBC AUTOMATED: CPT

## 2023-09-17 PROCEDURE — 87106 FUNGI IDENTIFICATION YEAST: CPT | Performed by: OTOLARYNGOLOGY

## 2023-09-17 PROCEDURE — 0B9C8ZZ DRAINAGE OF RIGHT UPPER LUNG LOBE, VIA NATURAL OR ARTIFICIAL OPENING ENDOSCOPIC: ICD-10-PCS | Performed by: OTOLARYNGOLOGY

## 2023-09-17 PROCEDURE — 88342 IMHCHEM/IMCYTCHM 1ST ANTB: CPT | Performed by: PATHOLOGY

## 2023-09-17 PROCEDURE — 88364 INSITU HYBRIDIZATION (FISH): CPT | Performed by: PATHOLOGY

## 2023-09-17 PROCEDURE — 84100 ASSAY OF PHOSPHORUS: CPT

## 2023-09-17 PROCEDURE — 88365 INSITU HYBRIDIZATION (FISH): CPT | Performed by: PATHOLOGY

## 2023-09-17 PROCEDURE — 94760 N-INVAS EAR/PLS OXIMETRY 1: CPT

## 2023-09-17 PROCEDURE — 88331 PATH CONSLTJ SURG 1 BLK 1SPC: CPT | Performed by: PATHOLOGY

## 2023-09-17 PROCEDURE — 0B110F4 BYPASS TRACHEA TO CUTANEOUS WITH TRACHEOSTOMY DEVICE, OPEN APPROACH: ICD-10-PCS | Performed by: OTOLARYNGOLOGY

## 2023-09-17 PROCEDURE — 81450 HL NEO GSAP 5-50DNA/DNA&RNA: CPT | Performed by: PATHOLOGY

## 2023-09-17 PROCEDURE — 88341 IMHCHEM/IMCYTCHM EA ADD ANTB: CPT | Performed by: PATHOLOGY

## 2023-09-17 PROCEDURE — 42800 BIOPSY OF THROAT: CPT | Performed by: OTOLARYNGOLOGY

## 2023-09-17 PROCEDURE — 99291 CRITICAL CARE FIRST HOUR: CPT | Performed by: INTERNAL MEDICINE

## 2023-09-17 PROCEDURE — 94150 VITAL CAPACITY TEST: CPT

## 2023-09-17 PROCEDURE — 31600 PLANNED TRACHEOSTOMY: CPT | Performed by: OTOLARYNGOLOGY

## 2023-09-17 PROCEDURE — 31615 TRCHEOBRNCHSC EST TRACHS INC: CPT | Performed by: OTOLARYNGOLOGY

## 2023-09-17 PROCEDURE — 83735 ASSAY OF MAGNESIUM: CPT

## 2023-09-17 PROCEDURE — 94003 VENT MGMT INPAT SUBQ DAY: CPT

## 2023-09-17 PROCEDURE — 0B9D8ZZ DRAINAGE OF RIGHT MIDDLE LUNG LOBE, VIA NATURAL OR ARTIFICIAL OPENING ENDOSCOPIC: ICD-10-PCS | Performed by: OTOLARYNGOLOGY

## 2023-09-17 PROCEDURE — 94640 AIRWAY INHALATION TREATMENT: CPT

## 2023-09-17 PROCEDURE — 87205 SMEAR GRAM STAIN: CPT | Performed by: OTOLARYNGOLOGY

## 2023-09-17 PROCEDURE — 88377 M/PHMTRC ALYS ISHQUANT/SEMIQ: CPT | Performed by: PATHOLOGY

## 2023-09-17 PROCEDURE — 88184 FLOWCYTOMETRY/ TC 1 MARKER: CPT | Performed by: OTOLARYNGOLOGY

## 2023-09-17 PROCEDURE — 88360 TUMOR IMMUNOHISTOCHEM/MANUAL: CPT | Performed by: PATHOLOGY

## 2023-09-17 PROCEDURE — 88305 TISSUE EXAM BY PATHOLOGIST: CPT | Performed by: PATHOLOGY

## 2023-09-17 PROCEDURE — 71045 X-RAY EXAM CHEST 1 VIEW: CPT

## 2023-09-17 PROCEDURE — 87070 CULTURE OTHR SPECIMN AEROBIC: CPT | Performed by: OTOLARYNGOLOGY

## 2023-09-17 RX ORDER — SODIUM CHLORIDE, SODIUM LACTATE, POTASSIUM CHLORIDE, CALCIUM CHLORIDE 600; 310; 30; 20 MG/100ML; MG/100ML; MG/100ML; MG/100ML
10 INJECTION, SOLUTION INTRAVENOUS CONTINUOUS
Status: DISCONTINUED | OUTPATIENT
Start: 2023-09-17 | End: 2023-09-20

## 2023-09-17 RX ORDER — ROCURONIUM BROMIDE 10 MG/ML
INJECTION, SOLUTION INTRAVENOUS AS NEEDED
Status: DISCONTINUED | OUTPATIENT
Start: 2023-09-17 | End: 2023-09-17

## 2023-09-17 RX ORDER — SODIUM CHLORIDE, SODIUM LACTATE, POTASSIUM CHLORIDE, CALCIUM CHLORIDE 600; 310; 30; 20 MG/100ML; MG/100ML; MG/100ML; MG/100ML
INJECTION, SOLUTION INTRAVENOUS CONTINUOUS PRN
Status: DISCONTINUED | OUTPATIENT
Start: 2023-09-17 | End: 2023-09-17

## 2023-09-17 RX ORDER — DEXMEDETOMIDINE HYDROCHLORIDE 4 UG/ML
.1-1.2 INJECTION, SOLUTION INTRAVENOUS
Status: DISCONTINUED | OUTPATIENT
Start: 2023-09-17 | End: 2023-09-20

## 2023-09-17 RX ORDER — LIDOCAINE HYDROCHLORIDE AND EPINEPHRINE 10; 10 MG/ML; UG/ML
INJECTION, SOLUTION INFILTRATION; PERINEURAL AS NEEDED
Status: DISCONTINUED | OUTPATIENT
Start: 2023-09-17 | End: 2023-09-17 | Stop reason: HOSPADM

## 2023-09-17 RX ORDER — ONDANSETRON 2 MG/ML
INJECTION INTRAMUSCULAR; INTRAVENOUS AS NEEDED
Status: DISCONTINUED | OUTPATIENT
Start: 2023-09-17 | End: 2023-09-17

## 2023-09-17 RX ORDER — DEXAMETHASONE SODIUM PHOSPHATE 10 MG/ML
INJECTION, SOLUTION INTRAMUSCULAR; INTRAVENOUS AS NEEDED
Status: DISCONTINUED | OUTPATIENT
Start: 2023-09-17 | End: 2023-09-17

## 2023-09-17 RX ORDER — OXYMETAZOLINE HYDROCHLORIDE 0.05 G/100ML
SPRAY NASAL AS NEEDED
Status: DISCONTINUED | OUTPATIENT
Start: 2023-09-17 | End: 2023-09-17 | Stop reason: HOSPADM

## 2023-09-17 RX ORDER — FENTANYL CITRATE 50 UG/ML
INJECTION, SOLUTION INTRAMUSCULAR; INTRAVENOUS AS NEEDED
Status: DISCONTINUED | OUTPATIENT
Start: 2023-09-17 | End: 2023-09-17

## 2023-09-17 RX ORDER — AMLODIPINE BESYLATE 5 MG/1
10 TABLET ORAL DAILY
Status: DISCONTINUED | OUTPATIENT
Start: 2023-09-17 | End: 2023-10-06

## 2023-09-17 RX ORDER — PROPOFOL 10 MG/ML
5-50 INJECTION, EMULSION INTRAVENOUS
Status: DISCONTINUED | OUTPATIENT
Start: 2023-09-17 | End: 2023-09-17

## 2023-09-17 RX ORDER — MAGNESIUM HYDROXIDE 1200 MG/15ML
LIQUID ORAL AS NEEDED
Status: DISCONTINUED | OUTPATIENT
Start: 2023-09-17 | End: 2023-09-17 | Stop reason: HOSPADM

## 2023-09-17 RX ORDER — PROPOFOL 10 MG/ML
INJECTION, EMULSION INTRAVENOUS AS NEEDED
Status: DISCONTINUED | OUTPATIENT
Start: 2023-09-17 | End: 2023-09-17

## 2023-09-17 RX ORDER — HYDRALAZINE HYDROCHLORIDE 20 MG/ML
5 INJECTION INTRAMUSCULAR; INTRAVENOUS ONCE
Status: COMPLETED | OUTPATIENT
Start: 2023-09-17 | End: 2023-09-17

## 2023-09-17 RX ORDER — HYDRALAZINE HYDROCHLORIDE 20 MG/ML
10 INJECTION INTRAMUSCULAR; INTRAVENOUS EVERY 6 HOURS PRN
Status: COMPLETED | OUTPATIENT
Start: 2023-09-17 | End: 2023-09-17

## 2023-09-17 RX ADMIN — NYSTATIN 500000 UNITS: 100000 SUSPENSION ORAL at 17:05

## 2023-09-17 RX ADMIN — HYDRALAZINE HYDROCHLORIDE 5 MG: 20 INJECTION, SOLUTION INTRAMUSCULAR; INTRAVENOUS at 01:10

## 2023-09-17 RX ADMIN — IPRATROPIUM BROMIDE 0.5 MG: 0.5 SOLUTION RESPIRATORY (INHALATION) at 13:13

## 2023-09-17 RX ADMIN — AMLODIPINE BESYLATE 10 MG: 5 TABLET ORAL at 13:07

## 2023-09-17 RX ADMIN — BUSPIRONE HYDROCHLORIDE 30 MG: 10 TABLET ORAL at 21:16

## 2023-09-17 RX ADMIN — CHLORHEXIDINE GLUCONATE 15 ML: 1.2 SOLUTION ORAL at 10:46

## 2023-09-17 RX ADMIN — Medication 150 MCG/HR: at 12:43

## 2023-09-17 RX ADMIN — PROPOFOL 40 MCG/KG/MIN: 10 INJECTION, EMULSION INTRAVENOUS at 11:25

## 2023-09-17 RX ADMIN — SENNOSIDES 8.8 MG: 8.8 SYRUP ORAL at 21:16

## 2023-09-17 RX ADMIN — IPRATROPIUM BROMIDE 0.5 MG: 0.5 SOLUTION RESPIRATORY (INHALATION) at 01:17

## 2023-09-17 RX ADMIN — PIPERACILLIN AND TAZOBACTAM 3.38 G: 36; 4.5 INJECTION, POWDER, FOR SOLUTION INTRAVENOUS at 04:05

## 2023-09-17 RX ADMIN — PIPERACILLIN AND TAZOBACTAM 3.38 G: 36; 4.5 INJECTION, POWDER, FOR SOLUTION INTRAVENOUS at 13:00

## 2023-09-17 RX ADMIN — DEXMEDETOMIDINE HYDROCHLORIDE 0.1 MCG/KG/HR: 4 INJECTION, SOLUTION INTRAVENOUS at 23:01

## 2023-09-17 RX ADMIN — CHLORHEXIDINE GLUCONATE 15 ML: 1.2 SOLUTION ORAL at 21:16

## 2023-09-17 RX ADMIN — GABAPENTIN 300 MG: 300 CAPSULE ORAL at 13:07

## 2023-09-17 RX ADMIN — POLYETHYLENE GLYCOL 3350 17 G: 17 POWDER, FOR SOLUTION ORAL at 15:45

## 2023-09-17 RX ADMIN — LEVALBUTEROL HYDROCHLORIDE 1.25 MG: 1.25 SOLUTION RESPIRATORY (INHALATION) at 20:02

## 2023-09-17 RX ADMIN — PROPOFOL 50 MG: 10 INJECTION, EMULSION INTRAVENOUS at 09:20

## 2023-09-17 RX ADMIN — GABAPENTIN 300 MG: 300 CAPSULE ORAL at 21:16

## 2023-09-17 RX ADMIN — PROPOFOL 30 MCG/KG/MIN: 10 INJECTION, EMULSION INTRAVENOUS at 06:00

## 2023-09-17 RX ADMIN — DEXAMETHASONE SODIUM PHOSPHATE 10 MG: 10 INJECTION, SOLUTION INTRAMUSCULAR; INTRAVENOUS at 08:19

## 2023-09-17 RX ADMIN — LEVALBUTEROL HYDROCHLORIDE 1.25 MG: 1.25 SOLUTION RESPIRATORY (INHALATION) at 13:13

## 2023-09-17 RX ADMIN — IPRATROPIUM BROMIDE 0.5 MG: 0.5 SOLUTION RESPIRATORY (INHALATION) at 20:02

## 2023-09-17 RX ADMIN — BUSPIRONE HYDROCHLORIDE 30 MG: 10 TABLET ORAL at 13:07

## 2023-09-17 RX ADMIN — ROCURONIUM BROMIDE 50 MG: 10 INJECTION, SOLUTION INTRAVENOUS at 08:50

## 2023-09-17 RX ADMIN — OXYCODONE HYDROCHLORIDE 5 MG: 5 TABLET ORAL at 21:16

## 2023-09-17 RX ADMIN — FENTANYL CITRATE 50 MCG: 50 INJECTION INTRAMUSCULAR; INTRAVENOUS at 08:13

## 2023-09-17 RX ADMIN — ONDANSETRON 4 MG: 2 INJECTION INTRAMUSCULAR; INTRAVENOUS at 09:21

## 2023-09-17 RX ADMIN — PROPOFOL 25 MCG/KG/MIN: 10 INJECTION, EMULSION INTRAVENOUS at 15:43

## 2023-09-17 RX ADMIN — NICOTINE 21 MG: 21 PATCH, EXTENDED RELEASE TRANSDERMAL at 10:29

## 2023-09-17 RX ADMIN — NYSTATIN 500000 UNITS: 100000 SUSPENSION ORAL at 21:16

## 2023-09-17 RX ADMIN — Medication 150 MCG/HR: at 06:00

## 2023-09-17 RX ADMIN — FENTANYL CITRATE 50 MCG: 50 INJECTION INTRAMUSCULAR; INTRAVENOUS at 19:57

## 2023-09-17 RX ADMIN — OXYCODONE HYDROCHLORIDE 5 MG: 5 TABLET ORAL at 13:07

## 2023-09-17 RX ADMIN — SERTRALINE HYDROCHLORIDE 50 MG: 50 TABLET ORAL at 13:07

## 2023-09-17 RX ADMIN — FENTANYL CITRATE 50 MCG: 50 INJECTION INTRAMUSCULAR; INTRAVENOUS at 08:45

## 2023-09-17 RX ADMIN — SODIUM CHLORIDE, SODIUM LACTATE, POTASSIUM CHLORIDE, AND CALCIUM CHLORIDE: .6; .31; .03; .02 INJECTION, SOLUTION INTRAVENOUS at 08:05

## 2023-09-17 RX ADMIN — Medication 150 MCG/HR: at 19:14

## 2023-09-17 RX ADMIN — PROPOFOL 50 MG: 10 INJECTION, EMULSION INTRAVENOUS at 10:09

## 2023-09-17 RX ADMIN — NYSTATIN 500000 UNITS: 100000 SUSPENSION ORAL at 10:46

## 2023-09-17 RX ADMIN — FAMOTIDINE 20 MG: 10 INJECTION INTRAVENOUS at 10:31

## 2023-09-17 RX ADMIN — PIPERACILLIN AND TAZOBACTAM 3.38 G: 36; 4.5 INJECTION, POWDER, FOR SOLUTION INTRAVENOUS at 20:04

## 2023-09-17 RX ADMIN — LEVALBUTEROL HYDROCHLORIDE 1.25 MG: 1.25 SOLUTION RESPIRATORY (INHALATION) at 01:17

## 2023-09-17 RX ADMIN — ROCURONIUM BROMIDE 50 MG: 10 INJECTION, SOLUTION INTRAVENOUS at 08:13

## 2023-09-17 RX ADMIN — HYDRALAZINE HYDROCHLORIDE 10 MG: 20 INJECTION, SOLUTION INTRAMUSCULAR; INTRAVENOUS at 10:43

## 2023-09-17 RX ADMIN — PHENYLEPHRINE HYDROCHLORIDE 4 DROP: 1 SPRAY NASAL at 08:35

## 2023-09-17 NOTE — ASSESSMENT & PLAN NOTE
· 9/14 Neck/ soft tissue CT: Large enhancing soft tissue mass identified within the left neck involving multiple spaces. This appears to be centered within the left oropharynx with extensions as described above into multiple spaces. This results in significant airway compromise. 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 Small level 3 and level 4 nodes are seen within the neck. · Tracheostomy by ENT, bx & addition testing performed  · Bx: Positive for malignancy, favor poorly differentiated carcinoma. Cannot entirely exclude a high grade large cell lymphoma. Portion of specimen submitted for flow cytometry. · H/o tobacco abuse.   · Daughter was updated at bedside    Plan:  · ENT following, appreciated  · Continue on vent at this moment

## 2023-09-17 NOTE — ASSESSMENT & PLAN NOTE
• POA with acute respiratory failure, SOB. • [facility]: Swollen tongue, swelling soft and hard palate and almost the head of all phalanx is completely closed. Severe angioedema. • Decadron 10 mg, Benadryl 25 mg given. • Initially refused but eventually agreed with intubation. • Prior episode of angioedema in 2020 noted. Suspected coadministration of increase the dose of tramadol and lisinopril. • Per daughter, there is no recent change in medications except Trelegy inhaler, cefdinir. • Etiology: maybe 2/2 oropharyngeal mass compression. Plan:  • Continue intubation.   • Trach placed  • Considering lowering sedation & testing w/o vent today

## 2023-09-17 NOTE — PROGRESS NOTES
[address]  Progress Note  Name: Cal Ramos  MRN: [id number]  Unit/Bed#: ICU 06 I Date of Admission: 9/13/2023   Date of Service: 9/17/2023 I Hospital Day: 4    Assessment/Plan   * Angioedema  Assessment & Plan  • POA with acute respiratory failure, SOB. • On physical in [facility] ED: Swollen tongue, swelling soft and hard palate and almost the head of all phalanx is completely closed. Severe angioedema. • Decadron 10 mg, Benadryl 25 mg given. • Initially refused but eventually agreed with intubation. • Prior episode of angioedema in 2020 noted. Suspected coadministration of increase the dose of tramadol and lisinopril. • Per daughter, there is no recent change in medications except Trelegy inhaler, cefdinir. • Etiology: maybe 2/2 oropharyngeal mass compression. Plan:  • Continue intubation. New onset right middle lobe pneumonia  Assessment & Plan  · 9/13 CT PE study: Patchy consolidation right middle lobe, new from 8/25/2023, compatible with pneumonia. · No leukocytosis, no fever/chills. · Positive for sore throat, cough. · Recent hospitalization for pneumonia noted. · New onset pneumonia after recent hospitalization and antibiotics treatment. · 9/14: Bronchoscopy/ ABL. · MRSA negative. · Preliminary cultures result came back negative so far. Plan:  · Continue Zosyn for broad coverage, may discontinue based on negative culture results  · Blood cultures neg 72 hr.  · Follow ABL. Acute respiratory failure with hypoxia (HCC)  Assessment & Plan  • POA with O2 saturate around 80% on room air. Needed high flow 60 L to bring up her O2 saturation to 95%. • Recent hospitalization for respiratory failure second to pneumonia. • CTA PE study negative for PE. New onset RML PNA. • Persistent partial atelectasis of the lower lobes noted. • Current daily smoker.   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     Plan:  • Continue intubation before tracheostomy, scheduled 9/17. • Completed Decadron. • DuoNeb. • Airway clearance protocol. IS.    PO (acute kidney injury) [facility])  Assessment & Plan  Lab Results   Component Value Date    CREATININE 0.89 09/16/2023    CREATININE 1.06 09/15/2023    CREATININE 1.40 (H) 09/14/2023       Likely prerenal.  Second to poor oral intake versus poor urine output. Baseline creatinine 1 to 1.2. Plan:  • Currently resolved. • UA w/ microscopic, Urinary retention protocol, Bladder scan, Daily weights, I/O  • IV fluid stopped today. • Monitor BMP daily and observe for downward trend of creatinine  • Avoid hypoperfusion of the kidneys, minimize nephrotoxins  • RAAS Blockers/Diuretics held: Lisinopril. Oropharyngeal mass  Assessment & Plan  · 9/14 Neck/ soft tissue CT: Large enhancing soft tissue mass identified within the left neck involving multiple spaces. This appears to be centered within the left oropharynx with extensions as described above into multiple spaces. This results in significant airway compromise. 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 Small level 3 and level 4 nodes are seen within the neck. · H/o tobacco abuse. · Daughter was updated at bedside    Plan:  · ENT following, appreciated  · Continue on vent at this moment  · Biopsy, Additional imaging,Tracheostomy per ENT 9/17    (HFpEF) heart failure with preserved ejection fraction [facility])  Assessment & Plan  Wt Readings from Last 3 Encounters:   09/16/23 78.7 kg (173 lb 8 oz)   09/07/23 74.6 kg (164 lb 6.4 oz)   08/30/23 73.3 kg (161 lb 9.6 oz)     Lab Results   Component Value Date    LVEF 60 08/28/2023     (H) 09/13/2023     (H) 08/25/2023     • Volume status: Euvolemic. • POA physical (limited): JVD-, HJR-, B/L LE trace to 1+ edema. • Echo 8/28/23: LVEF 60%.  D1DD.     Plan:  • CMP, magnesium; Goal Mg > 2 and K > 4; Replete prn  • HOB > 30°, Daily standing weights, Measure I/O  • Lasix 20 mg challenge if BP stable. Ambulatory dysfunction  Assessment & Plan  PT/ OT eval before discharge. Pain syndrome, chronic  Assessment & Plan  · Home regimen: Oxycodone 5 mg twice daily as needed. Tylenol 650 mg every 8 hours as needed. Gabapentin 600 mg 3 times daily. Medical marijuana. · Per daughter, patient was overusing Tylenol over-the-counter. · Pain mostly from lumbar radiculopathy. · Impaired ambulatory dysfunction second to pain. Plan:  · Restarted gabapentin 300 3 times daily, scheduled oxycodone 5 mg 3 times daily. Hyperlipidemia  Assessment & Plan  Lab Results   Component Value Date    CHOLESTEROL 203 (H) 01/24/2023    CHOLESTEROL 177 08/11/2022    CHOLESTEROL 184 07/08/2020     Lab Results   Component Value Date    HDL 45 (L) 01/24/2023    HDL 37 (L) 08/11/2022    HDL 44 (L) 07/08/2020     Lab Results   Component Value Date    TRIG 166 (H) 09/16/2023    TRIG 160 (H) 01/24/2023    TRIG 108 08/11/2022     Lab Results   Component Value Date    NONHDLC 146 07/12/2018    [facility] (H) 04/29/2013     Continue outpatient diet/ lifestyle modification. Benign essential hypertension  Assessment & Plan  Well controlled at home. Home meds: Amlodipine 10 mg daily, Coreg 25 mg twice daily, lisinopril 10 mg daily. Currently on amlodipine 10 mg daily with well controlled blood pressure. Blister (nonthermal) of left forearm, sequela  Assessment & Plan  · 9/17/23: Blisters of LUE noted, no signs of infection  · Daily wound care, monitor signs of infection              ICU Core Measures     Vented Patient  VAP Bundle  VAP bundle ordered     A: Assess, Prevent, and Manage Pain · Has pain been assessed? Yes  · Need for changes to pain regimen? No   B: Both Spontaneous Awakening Trials (SATs) and Spontaneous Breathing Trials (SBTs) · Plan to perform spontaneous awakening trial today? Yes   · Plan to perform spontaneous breathing trial today?  Yes   · Obvious barriers to extubation? Yes   C: Choice of Sedation · RASS Goal: -3 Moderate Sedation  · Need for changes to sedation or analgesia regimen? No   D: Delirium · CAM-ICU: Unable to perform secondary to Acute cognitive dysfunction   E: Early Mobility  · Plan for early mobility? No   F: Family Engagement · Plan for family engagement today? Yes       Antibiotic Review: Patient on appropriate coverage based on culture data. and Awaiting culture results. Review of Invasive Devices:            Prophylaxis:  VTE VTE covered by:  [START ON 9/18/2023] enoxaparin, Subcutaneous       Stress Ulcer  covered byFamotidine (PF) (PEPCID) injection 20 mg [[id number]]       Subjective   HPI/24hr events: Vitals remained stable, 1 epsiode of SBP in 180s since resolved. Blood cultures negative at 72 hrs. Bronch cultures pending. OR scheduled for this morning, received strait cath prior to departing for OR. Some blister found on arm, following. Ros negative except noted above. Objective                            Vitals I/O      Most Recent Min/Max in 24hrs   Temp 99.1 °F (37.3 °C) Temp  Min: 97.7 °F (36.5 °C)  Max: 99.1 °F (37.3 °C)   Pulse 63 Pulse  Min: 56  Max: 83   Resp 16 Resp  Min: 16  Max: 22   /73 BP  Min: 104/53  Max: 185/89   O2 Sat 90 % SpO2  Min: 90 %  Max: 98 %      Intake/Output Summary (Last 24 hours) at 9/17/2023 0725  Last data filed at 9/17/2023 0405  Gross per 24 hour   Intake 1781.23 ml   Output 1903 ml   Net -121.77 ml         Diet NPO; Sips with meds     Invasive Monitoring Physical exam    Physical Exam  Eyes:      Conjunctiva/sclera: Conjunctivae normal.   Skin:     General: Skin is warm and dry. HENT:      Head: Normocephalic and atraumatic. Cardiovascular:      Rate and Rhythm: Normal rate and regular rhythm. Abdominal:      Palpations: Abdomen is soft. Constitutional:       Interventions: She is sedated and intubated. Pulmonary:      Effort: She is intubated.    Neurological:      Mental Status: She is somnolent and calm. Corneal reflex present, cough reflex and gag reflex intact. Diagnostic Studies      EKG: None recent  Imaging: CT neck I have personally reviewed pertinent reports. Medications:  Scheduled PRN   busPIRone, 30 mg, TID  chlorhexidine, 15 mL, Q12H [facility]  [START ON 9/18/2023] enoxaparin, 40 mg, Q24H ASHLEY  famotidine, 20 mg, Q24H ASHLEY  gabapentin, 300 mg, TID  levalbuterol, 1.25 mg, Q6H   And  ipratropium, 0.5 mg, Q6H  nicotine, 21 mg, Daily  nystatin, 500,000 Units, 4x Daily  oxyCODONE, 5 mg, Q8H ASHLEY  piperacillin-tazobactam, 3.375 g, Q8H  senna, 8.8 mg, HS  sertraline, 50 mg, Daily      fentanyl citrate (PF), 50 mcg, Q2H PRN  levalbuterol, 1.25 mg, Q8H PRN  polyethylene glycol, 17 g, Daily PRN       Continuous    fentaNYL, 150 mcg/hr, Last Rate: 150 mcg/hr (09/17/23 0600)  propofol, 5-50 mcg/kg/min, Last Rate: 30 mcg/kg/min (09/17/23 0600)         Labs:    CBC    Recent Labs     09/16/23  0545 09/17/23  0436   WBC 12.06* 10.91*   HGB 13.6 12.7   HCT 41.9 36.8    167     BMP    Recent Labs     09/16/23  0545   SODIUM 139   K 4.0   *   CO2 23   AGAP 5   BUN 18   CREATININE 0.89   CALCIUM 7.9*       Coags    No recent results     Additional Electrolytes  Recent Labs     09/16/23  0545   MG 2.4   PHOS 2.7          Blood Gas    No recent results  No recent results LFTs  Recent Labs     09/16/23  0545   ALT 10   AST 10*   ALKPHOS 39   ALB 2.8*   TBILI 0.27       Infectious  No recent results  Glucose  Recent Labs     09/16/23  0545   GLUC 143*               Critical Care Time Delivered: Upon my evaluation, this patient had a high probability of imminent or life-threatening deterioration due to Mass, which required my direct attention, intervention, and personal management. I have personally provided 60 minutes of critical care time, exclusive of procedures, teaching, family meetings, and any prior time recorded by providers other than myself.      Benjamin Fernandez, DO

## 2023-09-17 NOTE — OP NOTE
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]  Ventilator dependence    Procedure(s):  TRACHEOSTOMY  Flexible bronchoscopy  Biopsy of nasopharynx mass    Specimen(s):  ID Type Source Tests Collected by Time Destination   1 : nasopharynx mass Tissue Lymph Node - Lymphoma Prtocol TISSUE EXAM, LEUKEMIA/LYMPHOMA FLOW CYTOMETRY Evens Smiley MD 2023  9:27 AM    2 : nasopharynx mass Tissue Nasopharynx/Oropharynx TISSUE EXAM Evens Smiley MD 2023  9:29 AM    A : sputum culture from flex bronchoscopy  Sputum Bronchus WOUND CULTURE Evens Smiley MD 2023  9:42 AM        Estimated Blood Loss:   10 mL    Drains:  NG/OG/Enteral Tube Nasogastric 18 Fr Right nare (Active)   Number of days: 0       External Urinary Catheter (Active)   Number of days: 0       Anesthesia Type:   General    Operative Indications:  Patient was intubated for airway compromise secondary to large nasopharyngeal/oropharyngeal tumor. Decision was made to convert to tracheostomy and obtain biopsies of the tumor. Operative Findings:  Normal tracheal anatomy. 7 cuffed proximal XLT shiley tracheostomy placed between 1st and 2nd tracheal cartilages given short neck and deep trachea. Surgicel placed within the tracheotomy site. 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     Preliminary from path - malignancy, either nasopharyngeal carcinoma vs lymphoma    Right middle/upper lobe thick mucous suctioned and sent for cultures; remaining segmental bronchi clear/healthy. Complications:   None    Procedure and Technique:  After obtaining informed consent, patient was taken to the operating room by the anesthesia team.  Patient was placed in the supine position on the operating room table. Time out was performed to confirm patient's name, ID, and procedure. Patient was already intubated. Eyes were protected with tape, the neck was extended, landmarks palpated and marked. Planned horizontal incision made just below cricoid cartilage. Local anesthesia with 1% lidocaine and 1:100,000 epi injected under skin. Site was prepped and draped in sterile fashion. Approximately 2.5 cm incision was made with 15 blade and dissection continued through subcutaneous fat and strap muscles staying midline. The cricoid was identified and skeletonized. The trachea was exposed. The space between 1st and 2nd cartilage was identified. Anesthesia was instructed to bring down FiO2 to near room air setting and the cuff was deflated. An incision was made between the 1st and 2nd tracheal cartilage and tracheal dilator was inserted. Anesthesia was asked to carefully pull back the ETT until the tip of the tube sat just above incision. Size 7 cuffed proximal XLT Shiley tracheostomy tube was placed in the tracheotomy with minimal resistance. The cuff was inflated and circuit was connected with appropriate end tidal CO2 recorded. The tracheostomy was secured to the skin in 4 quadrants with 2-0 nylon suture. A trach tie was placed also. Surgicel was placed under the trach flange for added hemostasis. Attention was turned to the biopsies. The Crow janie retractor was placed to expose oropharynx. Firm tumor was palpated just posterior to the soft palate on the left side extending inferiorly into the oropharynx. Cup forceps were used to obtain multiple biopsies of the mass behind the soft palate. They were sent for routine and lymphoma studies. Hemostasis was confirmed.   All instruments were removed from the oral cavity. Attention was turned to the flexible tracheobronchoscopy. The flexible bronchoscope was inserted through the tracheotomy tube. It was advanced to obtain views of the trachea, sujata, mainstem bronchi, segmental bronchi in both lungs. The right middle and upper lobes contained thick white mucous which was suctioned into a lukens trap and cultured. The bronchoscope was removed. Care of patient was returned to anesthesia. She was then transferred to the ICU. I was present for the entire procedure.     Patient Disposition:  Critical Care Unit        SIGNATURE: Ciarra Gonzalez MD  DATE: September 17, 2023  TIME: 10:01 AM

## 2023-09-17 NOTE — ASSESSMENT & PLAN NOTE
Lab Results   Component Value Date    CHOLESTEROL 203 (H) 01/24/2023    CHOLESTEROL 177 08/11/2022    CHOLESTEROL 184 07/08/2020     Lab Results   Component Value Date    HDL 45 (L) 01/24/2023    HDL 37 (L) 08/11/2022    HDL 44 (L) 07/08/2020     Lab Results   Component Value Date    TRIG 166 (H) 09/16/2023    TRIG 160 (H) 01/24/2023    TRIG 108 08/11/2022     Lab Results   Component Value Date    NONHDLC 146 07/12/2018    [facility] 207 (H) 04/29/2013     Continue outpatient diet/ lifestyle modification.

## 2023-09-17 NOTE — ASSESSMENT & PLAN NOTE
Well controlled at home. Home meds: Amlodipine 10 mg daily, Coreg 25 mg twice daily, lisinopril 10 mg daily. Currently on amlodipine 10 mg daily with somewhat controlled blood pressure. Have used hydralazine for further control.

## 2023-09-17 NOTE — ASSESSMENT & PLAN NOTE
· 9/13 CT PE study: Patchy consolidation right middle lobe, new from 8/25/2023, compatible with pneumonia. · No leukocytosis, no fever/chills. · Positive for sore throat, cough. · Recent hospitalization for pneumonia noted. · New onset pneumonia after recent hospitalization and antibiotics treatment. · 9/14: Bronchoscopy/ ABL. · MRSA negative. · Preliminary cultures result came back negative so far.     Plan:  · Zosyn completed  · Blood cultures neg  · Follow ABL

## 2023-09-17 NOTE — ANESTHESIA POSTPROCEDURE EVALUATION
Post-Op Assessment Note    CV Status:  Stable  Pain Score: 0    Pain management: adequate     Mental Status:  Sleepy   Hydration Status:  Euvolemic   PONV Controlled:  Controlled   Airway Patency:  Patent      Post Op Vitals Reviewed: Yes      Staff: CRNA         No notable events documented.     BP   201/97 bp meds to be restarted   Temp   rn note   Pulse  72   Resp   16   SpO2   99% ett in situ

## 2023-09-17 NOTE — INTERIM OP NOTE
TRACHEOSTOMY, biopsy of nasopharynx mass, BRONCHOSCOPY FLEXIBLE  Postoperative Note  PATIENT NAME: Marva Garcia  : 1955  MRN: [id number]  AN OR ROOM 01    Surgery Date: 2023    Preop Diagnosis:  Oropharyngeal/Nasopharyngeal mass [J39.2]    Post-Op Diagnosis Codes:  Nasopharynx mass    Procedure(s) (LRB):  TRACHEOSTOMY  BRONCHOSCOPY FLEXIBLE  Biopsy of nasopharynx mass    Surgeon(s) and Role:     * Bishnu Agrawal MD - Primary     * Macho Gonzalez MD - Assisting    Specimens:  ID Type Source Tests Collected by Time Destination   1 : nasopharynx mass Tissue Lymph Node - Lymphoma Prtocol TISSUE EXAM, LEUKEMIA/LYMPHOMA FLOW CYTOMETRY Bishnu Agrawal MD 2023  9:27 AM    2 : nasopharynx mass Tissue Nasopharynx/Oropharynx TISSUE EXAM Bishnu Agrawal MD 2023  9:29 AM    A : sputum culture from flex bronchoscopy  Sputum Bronchus WOUND CULTURE Bishnu Agrawal MD 2023  9:42 AM        Estimated Blood Loss:   10 mL    Anesthesia Type:   General     Findings:   Normal tracheal anatomy. 7 cuffed proximal XLT tracheostomy placed between 1st and 2nd tracheal cartilages given short neck and deep.surgicel placed within the tracheotomy site. 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 Preliminary from path - malignancy, either nasopharyngeal carcinoma vs lymphoma  Right middle/upper lobe thick mucous suctioned and sent for cultures; remaining segmental bronchi clear/healthy.     Complications:   None      SIGNATURE: Bishnu Agrawal MD   DATE: 2023   TIME: 10:01 AM

## 2023-09-17 NOTE — ASSESSMENT & PLAN NOTE
Lab Results   Component Value Date    CREATININE 0.83 09/17/2023    CREATININE 0.89 09/16/2023    CREATININE 1.06 09/15/2023       Likely prerenal.  Second to poor oral intake versus poor urine output. Baseline creatinine 1 to 1.2. Plan:  • Currently resolved  • Urinary retention protocol, Daily weights, I/O  • Monitor BMP daily and observe for downward trend of creatinine  • Avoid hypoperfusion of the kidneys, minimize nephrotoxins  • RAAS Blockers/Diuretics held: Lisinopril.

## 2023-09-17 NOTE — ASSESSMENT & PLAN NOTE
· 9/17/23: Blisters of LUE noted, no signs of infection  · Daily wound care, monitor signs of infection

## 2023-09-17 NOTE — ANESTHESIA PREPROCEDURE EVALUATION
Procedure:  LARYNGOSCOPY DIRECT (Throat)  TRACHEOSTOMY (Throat)    Relevant Problems   CARDIO   (+) Benign essential hypertension   (+) Hyperlipidemia      /RENAL   (+) PO (acute kidney injury) (HCC)   (+) Stage 3a chronic kidney disease (HCC)      MUSCULOSKELETAL   (+) Disc degeneration, lumbar   (+) Myofascial pain syndrome   (+) Osteoarthritis of spine with radiculopathy, lumbar region      NEURO/PSYCH   (+) Myofascial pain syndrome   (+) Pain syndrome, chronic      PULMONARY   (+) Acute respiratory failure with hypoxia (HCC)   (+) New onset right middle lobe pneumonia        Physical Exam    Airway       Dental       Cardiovascular      Pulmonary      Other Findings  ETT in situ      Anesthesia Plan  ASA Score- 4     Anesthesia Type- general with ASA Monitors. Additional Monitors:   Airway Plan: ETT. Plan Factors-    Chart reviewed. EKG reviewed. Existing labs reviewed. Patient summary reviewed. There is medical exclusion for perioperative obstructive sleep apnea risk education. Induction- inhalational.    Postoperative Plan-     Informed Consent- Anesthetic plan and risks discussed with daughter. I personally reviewed this patient with the CRNA. Discussed and agreed on the Anesthesia Plan with the CRNA. Shasta Carreon

## 2023-09-17 NOTE — ASSESSMENT & PLAN NOTE
• POA with O2 saturate around 80% on room air. Needed high flow 60 L to bring up her O2 saturation to 95%. • Recent hospitalization for respiratory failure second to pneumonia. • CTA PE study negative for PE. New onset RML PNA. • Persistent partial atelectasis of the lower lobes noted. • Current daily smoker. 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     Plan:  • Tracheostomy 9/17. • Completed Decadron. • DuoNeb. • Airway clearance protocol.  IS.

## 2023-09-18 PROBLEM — J44.9 COPD WITHOUT EXACERBATION (HCC): Status: ACTIVE | Noted: 2023-09-18

## 2023-09-18 PROBLEM — N18.30 CKD (CHRONIC KIDNEY DISEASE) STAGE 3, GFR 30-59 ML/MIN (HCC): Status: ACTIVE | Noted: 2021-08-20

## 2023-09-18 PROBLEM — G93.40 ENCEPHALOPATHY: Status: ACTIVE | Noted: 2023-09-18

## 2023-09-18 LAB
ANION GAP SERPL CALCULATED.3IONS-SCNC: 6 MMOL/L
BACTERIA BLD CULT: NORMAL
BACTERIA BLD CULT: NORMAL
BASOPHILS # BLD AUTO: 0.01 THOUSANDS/ÂΜL (ref 0–0.1)
BASOPHILS NFR BLD AUTO: 0 % (ref 0–1)
BUN SERPL-MCNC: 24 MG/DL (ref 5–25)
CALCIUM SERPL-MCNC: 8.4 MG/DL (ref 8.4–10.2)
CHLORIDE SERPL-SCNC: 111 MMOL/L (ref 96–108)
CO2 SERPL-SCNC: 26 MMOL/L (ref 21–32)
CREAT SERPL-MCNC: 0.98 MG/DL (ref 0.6–1.3)
EOSINOPHIL # BLD AUTO: 0.05 THOUSAND/ÂΜL (ref 0–0.61)
EOSINOPHIL NFR BLD AUTO: 0 % (ref 0–6)
ERYTHROCYTE [DISTWIDTH] IN BLOOD BY AUTOMATED COUNT: 17 % (ref 11.6–15.1)
GFR SERPL CREATININE-BSD FRML MDRD: 59 ML/MIN/1.73SQ M
GLUCOSE SERPL-MCNC: 112 MG/DL (ref 65–140)
HCT VFR BLD AUTO: 41.1 % (ref 34.8–46.1)
HGB BLD-MCNC: 13.6 G/DL (ref 11.5–15.4)
IMM GRANULOCYTES # BLD AUTO: 0.07 THOUSAND/UL (ref 0–0.2)
IMM GRANULOCYTES NFR BLD AUTO: 1 % (ref 0–2)
LYMPHOCYTES # BLD AUTO: 1.24 THOUSANDS/ÂΜL (ref 0.6–4.47)
LYMPHOCYTES NFR BLD AUTO: 11 % (ref 14–44)
MAGNESIUM SERPL-MCNC: 2.3 MG/DL (ref 1.9–2.7)
MCH RBC QN AUTO: 32 PG (ref 26.8–34.3)
MCHC RBC AUTO-ENTMCNC: 33.1 G/DL (ref 31.4–37.4)
MCV RBC AUTO: 97 FL (ref 82–98)
MONOCYTES # BLD AUTO: 0.9 THOUSAND/ÂΜL (ref 0.17–1.22)
MONOCYTES NFR BLD AUTO: 8 % (ref 4–12)
NEUTROPHILS # BLD AUTO: 9.58 THOUSANDS/ÂΜL (ref 1.85–7.62)
NEUTS SEG NFR BLD AUTO: 80 % (ref 43–75)
NRBC BLD AUTO-RTO: 0 /100 WBCS
PHOSPHATE SERPL-MCNC: 2.4 MG/DL (ref 2.3–4.1)
PLATELET # BLD AUTO: 162 THOUSANDS/UL (ref 149–390)
PMV BLD AUTO: 10.1 FL (ref 8.9–12.7)
POTASSIUM SERPL-SCNC: 3.9 MMOL/L (ref 3.5–5.3)
RBC # BLD AUTO: 4.25 MILLION/UL (ref 3.81–5.12)
SODIUM SERPL-SCNC: 143 MMOL/L (ref 135–147)
WBC # BLD AUTO: 11.85 THOUSAND/UL (ref 4.31–10.16)

## 2023-09-18 PROCEDURE — 83735 ASSAY OF MAGNESIUM: CPT | Performed by: PHARMACIST

## 2023-09-18 PROCEDURE — 99223 1ST HOSP IP/OBS HIGH 75: CPT | Performed by: INTERNAL MEDICINE

## 2023-09-18 PROCEDURE — 80048 BASIC METABOLIC PNL TOTAL CA: CPT | Performed by: PHARMACIST

## 2023-09-18 PROCEDURE — 85025 COMPLETE CBC W/AUTO DIFF WBC: CPT | Performed by: PHARMACIST

## 2023-09-18 PROCEDURE — 94003 VENT MGMT INPAT SUBQ DAY: CPT

## 2023-09-18 PROCEDURE — 94640 AIRWAY INHALATION TREATMENT: CPT

## 2023-09-18 PROCEDURE — 99223 1ST HOSP IP/OBS HIGH 75: CPT

## 2023-09-18 PROCEDURE — 99291 CRITICAL CARE FIRST HOUR: CPT | Performed by: INTERNAL MEDICINE

## 2023-09-18 PROCEDURE — 94002 VENT MGMT INPAT INIT DAY: CPT

## 2023-09-18 PROCEDURE — 94664 DEMO&/EVAL PT USE INHALER: CPT

## 2023-09-18 PROCEDURE — 94760 N-INVAS EAR/PLS OXIMETRY 1: CPT

## 2023-09-18 PROCEDURE — 94150 VITAL CAPACITY TEST: CPT

## 2023-09-18 PROCEDURE — 84100 ASSAY OF PHOSPHORUS: CPT | Performed by: PHARMACIST

## 2023-09-18 RX ORDER — HYDROMORPHONE HCL/PF 1 MG/ML
0.5 SYRINGE (ML) INJECTION ONCE
Status: COMPLETED | OUTPATIENT
Start: 2023-09-18 | End: 2023-09-18

## 2023-09-18 RX ORDER — WATER 10 ML/10ML
INJECTION INTRAMUSCULAR; INTRAVENOUS; SUBCUTANEOUS
Status: COMPLETED
Start: 2023-09-18 | End: 2023-09-18

## 2023-09-18 RX ORDER — ACETAMINOPHEN 325 MG/1
975 TABLET ORAL ONCE
Status: COMPLETED | OUTPATIENT
Start: 2023-09-18 | End: 2023-09-18

## 2023-09-18 RX ORDER — SODIUM CHLORIDE, SODIUM GLUCONATE, SODIUM ACETATE, POTASSIUM CHLORIDE, MAGNESIUM CHLORIDE, SODIUM PHOSPHATE, DIBASIC, AND POTASSIUM PHOSPHATE .53; .5; .37; .037; .03; .012; .00082 G/100ML; G/100ML; G/100ML; G/100ML; G/100ML; G/100ML; G/100ML
500 INJECTION, SOLUTION INTRAVENOUS ONCE
Status: COMPLETED | OUTPATIENT
Start: 2023-09-18 | End: 2023-09-18

## 2023-09-18 RX ORDER — OLANZAPINE 10 MG/2ML
5 INJECTION, POWDER, FOR SOLUTION INTRAMUSCULAR ONCE
Status: COMPLETED | OUTPATIENT
Start: 2023-09-18 | End: 2023-09-18

## 2023-09-18 RX ORDER — CARVEDILOL 12.5 MG/1
25 TABLET ORAL 2 TIMES DAILY WITH MEALS
Status: DISCONTINUED | OUTPATIENT
Start: 2023-09-18 | End: 2023-09-22

## 2023-09-18 RX ORDER — SODIUM CHLORIDE, SODIUM GLUCONATE, SODIUM ACETATE, POTASSIUM CHLORIDE, MAGNESIUM CHLORIDE, SODIUM PHOSPHATE, DIBASIC, AND POTASSIUM PHOSPHATE .53; .5; .37; .037; .03; .012; .00082 G/100ML; G/100ML; G/100ML; G/100ML; G/100ML; G/100ML; G/100ML
1000 INJECTION, SOLUTION INTRAVENOUS ONCE
Status: DISCONTINUED | OUTPATIENT
Start: 2023-09-18 | End: 2023-09-18

## 2023-09-18 RX ORDER — CARVEDILOL 12.5 MG/1
25 TABLET ORAL 2 TIMES DAILY WITH MEALS
Status: DISCONTINUED | OUTPATIENT
Start: 2023-09-18 | End: 2023-09-18

## 2023-09-18 RX ADMIN — NYSTATIN 500000 UNITS: 100000 SUSPENSION ORAL at 09:02

## 2023-09-18 RX ADMIN — BUSPIRONE HYDROCHLORIDE 30 MG: 10 TABLET ORAL at 21:22

## 2023-09-18 RX ADMIN — NICOTINE 21 MG: 21 PATCH, EXTENDED RELEASE TRANSDERMAL at 09:02

## 2023-09-18 RX ADMIN — ENOXAPARIN SODIUM 40 MG: 40 INJECTION SUBCUTANEOUS at 09:02

## 2023-09-18 RX ADMIN — SERTRALINE HYDROCHLORIDE 50 MG: 50 TABLET ORAL at 09:02

## 2023-09-18 RX ADMIN — DEXMEDETOMIDINE HYDROCHLORIDE 1.2 MCG/KG/HR: 4 INJECTION, SOLUTION INTRAVENOUS at 05:53

## 2023-09-18 RX ADMIN — CARVEDILOL 25 MG: 12.5 TABLET, FILM COATED ORAL at 18:09

## 2023-09-18 RX ADMIN — DEXMEDETOMIDINE HYDROCHLORIDE 1 MCG/KG/HR: 4 INJECTION, SOLUTION INTRAVENOUS at 10:02

## 2023-09-18 RX ADMIN — FENTANYL CITRATE 50 MCG: 50 INJECTION INTRAMUSCULAR; INTRAVENOUS at 08:06

## 2023-09-18 RX ADMIN — OXYCODONE HYDROCHLORIDE 5 MG: 5 TABLET ORAL at 21:21

## 2023-09-18 RX ADMIN — LEVALBUTEROL HYDROCHLORIDE 1.25 MG: 1.25 SOLUTION RESPIRATORY (INHALATION) at 08:23

## 2023-09-18 RX ADMIN — GABAPENTIN 300 MG: 300 CAPSULE ORAL at 15:00

## 2023-09-18 RX ADMIN — OLANZAPINE 5 MG: 10 INJECTION, POWDER, FOR SOLUTION INTRAMUSCULAR at 03:39

## 2023-09-18 RX ADMIN — IPRATROPIUM BROMIDE 0.5 MG: 0.5 SOLUTION RESPIRATORY (INHALATION) at 08:23

## 2023-09-18 RX ADMIN — LEVALBUTEROL HYDROCHLORIDE 1.25 MG: 1.25 SOLUTION RESPIRATORY (INHALATION) at 20:34

## 2023-09-18 RX ADMIN — Medication 150 MCG/HR: at 08:07

## 2023-09-18 RX ADMIN — SODIUM CHLORIDE, SODIUM GLUCONATE, SODIUM ACETATE, POTASSIUM CHLORIDE AND MAGNESIUM CHLORIDE 500 ML: 526; 502; 368; 37; 30 INJECTION, SOLUTION INTRAVENOUS at 02:02

## 2023-09-18 RX ADMIN — CHLORHEXIDINE GLUCONATE 15 ML: 1.2 SOLUTION ORAL at 09:02

## 2023-09-18 RX ADMIN — CHLORHEXIDINE GLUCONATE 15 ML: 1.2 SOLUTION ORAL at 21:21

## 2023-09-18 RX ADMIN — FENTANYL CITRATE 50 MCG: 50 INJECTION INTRAMUSCULAR; INTRAVENOUS at 01:58

## 2023-09-18 RX ADMIN — SENNOSIDES 8.8 MG: 8.8 SYRUP ORAL at 21:22

## 2023-09-18 RX ADMIN — FAMOTIDINE 20 MG: 10 INJECTION INTRAVENOUS at 09:02

## 2023-09-18 RX ADMIN — OXYCODONE HYDROCHLORIDE 5 MG: 5 TABLET ORAL at 15:00

## 2023-09-18 RX ADMIN — GABAPENTIN 300 MG: 300 CAPSULE ORAL at 21:21

## 2023-09-18 RX ADMIN — IPRATROPIUM BROMIDE 0.5 MG: 0.5 SOLUTION RESPIRATORY (INHALATION) at 13:20

## 2023-09-18 RX ADMIN — DEXMEDETOMIDINE HYDROCHLORIDE 0.5 MCG/KG/HR: 4 INJECTION, SOLUTION INTRAVENOUS at 18:02

## 2023-09-18 RX ADMIN — LEVALBUTEROL HYDROCHLORIDE 1.25 MG: 1.25 SOLUTION RESPIRATORY (INHALATION) at 13:20

## 2023-09-18 RX ADMIN — IPRATROPIUM BROMIDE 0.5 MG: 0.5 SOLUTION RESPIRATORY (INHALATION) at 20:34

## 2023-09-18 RX ADMIN — OXYCODONE HYDROCHLORIDE 5 MG: 5 TABLET ORAL at 05:57

## 2023-09-18 RX ADMIN — WATER 10 ML: 1 INJECTION INTRAMUSCULAR; INTRAVENOUS; SUBCUTANEOUS at 03:39

## 2023-09-18 RX ADMIN — POLYETHYLENE GLYCOL 3350 17 G: 17 POWDER, FOR SOLUTION ORAL at 15:00

## 2023-09-18 RX ADMIN — FENTANYL CITRATE 50 MCG: 50 INJECTION INTRAMUSCULAR; INTRAVENOUS at 04:51

## 2023-09-18 RX ADMIN — ACETAMINOPHEN 975 MG: 325 TABLET, FILM COATED ORAL at 19:31

## 2023-09-18 RX ADMIN — Medication 150 MCG/HR: at 01:19

## 2023-09-18 RX ADMIN — BUSPIRONE HYDROCHLORIDE 30 MG: 10 TABLET ORAL at 09:03

## 2023-09-18 RX ADMIN — BUSPIRONE HYDROCHLORIDE 30 MG: 10 TABLET ORAL at 15:01

## 2023-09-18 RX ADMIN — LEVALBUTEROL HYDROCHLORIDE 1.25 MG: 1.25 SOLUTION RESPIRATORY (INHALATION) at 02:48

## 2023-09-18 RX ADMIN — AMLODIPINE BESYLATE 10 MG: 5 TABLET ORAL at 09:02

## 2023-09-18 RX ADMIN — HYDROMORPHONE HYDROCHLORIDE 0.5 MG: 1 INJECTION, SOLUTION INTRAMUSCULAR; INTRAVENOUS; SUBCUTANEOUS at 02:28

## 2023-09-18 RX ADMIN — GABAPENTIN 300 MG: 300 CAPSULE ORAL at 09:02

## 2023-09-18 RX ADMIN — IPRATROPIUM BROMIDE 0.5 MG: 0.5 SOLUTION RESPIRATORY (INHALATION) at 02:48

## 2023-09-18 RX ADMIN — FENTANYL CITRATE 50 MCG: 50 INJECTION INTRAMUSCULAR; INTRAVENOUS at 00:49

## 2023-09-18 NOTE — PLAN OF CARE
Problem: MOBILITY - ADULT  Goal: Maintain or return to baseline ADL function  Description: INTERVENTIONS:  -  Assess patient's ability to carry out ADLs; assess patient's baseline for ADL function and identify physical deficits which impact ability to perform ADLs (bathing, care of mouth/teeth, toileting, grooming, dressing, etc.)  - Assess/evaluate cause of self-care deficits   - Assess range of motion  - Assess patient's mobility; develop plan if impaired  - Assess patient's need for assistive devices and provide as appropriate  - Encourage maximum independence but intervene and supervise when necessary  - Involve family in performance of ADLs  - Assess for home care needs following discharge   - Consider OT consult to assist with ADL evaluation and planning for discharge  - Provide patient education as appropriate  Outcome: Progressing  Goal: Maintains/Returns to pre admission functional level  Description: INTERVENTIONS:  - Perform BMAT or MOVE assessment daily.   - Set and communicate daily mobility goal to care team and patient/family/caregiver. - Collaborate with rehabilitation services on mobility goals if consulted  - Perform Range of Motion  times a day. - Reposition patient every  hours.   - Dangle patient  times a day  - Stand patient  times a day  - Ambulate patient  times a day  - Out of bed to chair  times a day   - Out of bed for meals times a day  - Out of bed for toileting  - Record patient progress and toleration of activity level   Outcome: Progressing     Problem: Prexisting or High Potential for Compromised Skin Integrity  Goal: Skin integrity is maintained or improved  Description: INTERVENTIONS:  - Identify patients at risk for skin breakdown  - Assess and monitor skin integrity  - Assess and monitor nutrition and hydration status  - Monitor labs   - Assess for incontinence   - Turn and reposition patient  - Assist with mobility/ambulation  - Relieve pressure over bony prominences  - Avoid friction and shearing  - Provide appropriate hygiene as needed including keeping skin clean and dry  - Evaluate need for skin moisturizer/barrier cream  - Collaborate with interdisciplinary team   - Patient/family teaching  - Consider wound care consult   Outcome: Progressing     Problem: SAFETY,RESTRAINT: NV/NON-SELF DESTRUCTIVE BEHAVIOR  Goal: Remains free of harm/injury (restraint for non violent/non self-detsructive behavior)  Description: INTERVENTIONS:  - Instruct patient/family regarding restraint use   - Assess and monitor physiologic and psychological status   - Provide interventions and comfort measures to meet assessed patient needs   - Identify and implement measures to help patient regain control  - Assess readiness for release of restraint   Outcome: Progressing  Goal: Returns to optimal restraint-free functioning  Description: INTERVENTIONS:  - Assess the patient's behavior and symptoms that indicate continued need for restraint  - Identify and implement measures to help patient regain control  - Assess readiness for release of restraint   Outcome: Progressing     Problem: Nutrition/Hydration-ADULT  Goal: Nutrient/Hydration intake appropriate for improving, restoring or maintaining nutritional needs  Description: Monitor and assess patient's nutrition/hydration status for malnutrition. Collaborate with interdisciplinary team and initiate plan and interventions as ordered. Monitor patient's weight and dietary intake as ordered or per policy. Utilize nutrition screening tool and intervene as necessary. Determine patient's food preferences and provide high-protein, high-caloric foods as appropriate.      INTERVENTIONS:  - Monitor oral intake, urinary output, labs, and treatment plans  - Assess nutrition and hydration status and recommend course of action  - Evaluate amount of meals eaten  - Assist patient with eating if necessary   - Allow adequate time for meals  - Recommend/ encourage appropriate diets, oral nutritional supplements, and vitamin/mineral supplements  - Order, calculate, and assess calorie counts as needed  - Recommend, monitor, and adjust tube feedings and TPN/PPN based on assessed needs  - Assess need for intravenous fluids  - Provide specific nutrition/hydration education as appropriate  - Include patient/family/caregiver in decisions related to nutrition  Outcome: Progressing

## 2023-09-18 NOTE — ASSESSMENT & PLAN NOTE
· 9/13 CT PE study: Patchy consolidation right middle lobe, new from 8/25/2023, compatible with pneumonia. · No leukocytosis, no fever/chills. · Positive for sore throat, cough. · New onset pneumonia after recent hospitalization and antibiotics treatment. · 9/14: Bronchoscopy/ ABL. · MRSA negative.    · ABL revealed 2+ Growth of Candida albicans    Plan:  · Zosyn completed  · Blood cultures neg

## 2023-09-18 NOTE — ASSESSMENT & PLAN NOTE
· 9/14 Neck/ soft tissue CT: Large enhancing soft tissue mass identified within the left neck involving multiple spaces. This appears to be centered within the left oropharynx with extensions as described above into multiple spaces. This results in significant airway compromise. 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 Small level 3 and level 4 nodes are seen within the neck. · Tracheostomy by ENT, bx & addition testing performed  · Bx: Positive for malignancy, favor poorly differentiated carcinoma. Cannot entirely exclude a high grade large cell lymphoma. Portion of specimen submitted for flow cytometry. · H/o tobacco abuse.   · Daughter was updated at bedside    Plan:  · ENT/ Med onc following, appreciated  · Follow final biopsy report  · Continue on vent per Trach at this moment

## 2023-09-18 NOTE — RESPIRATORY THERAPY NOTE
RT Ventilator Management Note  Cornelio Alegre 69 y.o. female MRN: [id number]  Unit/Bed#: ICU 06 Encounter: [id number]      Daily Screen         9/18/2023  0824 9/18/2023  1038          Patient safety screen outcome[de-identified] Passed --      Spont breathing trial % for 30 min: Yes --      Spont breathing trial outcome[de-identified] -- Passed      Previous settings resumed: -- No (comment)                Physical Exam:          Resp Comments: pt taken off vent to TC 45%. pt suctioned and cuff deflated. pt inner cannula and Trach ties replaced with new.

## 2023-09-18 NOTE — PROGRESS NOTES
[facility] [facility]  Progress Note  Name: Carmel Holly  MRN: [id number]  Unit/Bed#: ICU 06 I Date of Admission: 9/13/2023   Date of Service: 9/18/2023 I Hospital Day: 5    Assessment/Plan   * Angioedema  Assessment & Plan  • POA with acute respiratory failure, SOB. • On physical in [facility] ED: Swollen tongue, swelling soft and hard palate and almost the head of all phalanx is completely closed. Severe angioedema. • Decadron 10 mg, Benadryl 25 mg given. • Initially refused but eventually agreed with intubation. • Prior episode of angioedema in 2020 noted. Suspected coadministration of increase the dose of tramadol and lisinopril. • Per daughter, there is no recent change in medications except Trelegy inhaler, cefdinir. • Etiology: maybe 2/2 oropharyngeal mass compression. Plan:  • Continue intubation. • Trach placed  • Considering lowering sedation & testing w/o vent today      New onset right middle lobe pneumonia  Assessment & Plan  · 9/13 CT PE study: Patchy consolidation right middle lobe, new from 8/25/2023, compatible with pneumonia. · No leukocytosis, no fever/chills. · Positive for sore throat, cough. · Recent hospitalization for pneumonia noted. · New onset pneumonia after recent hospitalization and antibiotics treatment. · 9/14: Bronchoscopy/ ABL. · MRSA negative. · Preliminary cultures result came back negative so far. Plan:  · Zosyn completed  · Blood cultures neg  · Follow ABL    Acute respiratory failure with hypoxia (HCC)  Assessment & Plan  • POA with O2 saturate around 80% on room air. Needed high flow 60 L to bring up her O2 saturation to 95%. • Recent hospitalization for respiratory failure second to pneumonia. • CTA PE study negative for PE. New onset RML PNA. • Persistent partial atelectasis of the lower lobes noted. • Current daily smoker.   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     Plan:  • Tracheostomy 9/17. • Completed Decadron. • DuoNeb. • Airway clearance protocol. IS.    PO (acute kidney injury) [facility])  Assessment & Plan  Lab Results   Component Value Date    CREATININE 0.83 09/17/2023    CREATININE 0.89 09/16/2023    CREATININE 1.06 09/15/2023       Likely prerenal.  Second to poor oral intake versus poor urine output. Baseline creatinine 1 to 1.2. Plan:  • Currently resolved  • Urinary retention protocol, Daily weights, I/O  • Monitor BMP daily and observe for downward trend of creatinine  • Avoid hypoperfusion of the kidneys, minimize nephrotoxins  • RAAS Blockers/Diuretics held: Lisinopril. Oropharyngeal mass  Assessment & Plan  · 9/14 Neck/ soft tissue CT: Large enhancing soft tissue mass identified within the left neck involving multiple spaces. This appears to be centered within the left oropharynx with extensions as described above into multiple spaces. This results in significant airway compromise. 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 Small level 3 and level 4 nodes are seen within the neck. · Tracheostomy by ENT, bx & addition testing performed  · Bx: Positive for malignancy, favor poorly differentiated carcinoma. Cannot entirely exclude a high grade large cell lymphoma. Portion of specimen submitted for flow cytometry. · H/o tobacco abuse. · Daughter was updated at bedside    Plan:  · ENT following, appreciated  · Continue on vent at this moment    (HFpEF) heart failure with preserved ejection fraction [facility])  Assessment & Plan  Wt Readings from Last 3 Encounters:   09/16/23 78.7 kg (173 lb 8 oz)   09/07/23 74.6 kg (164 lb 6.4 oz)   08/30/23 73.3 kg (161 lb 9.6 oz)     Lab Results   Component Value Date    LVEF 60 08/28/2023     (H) 09/13/2023     (H) 08/25/2023     • Volume status: Euvolemic. • POA physical (limited): JVD-, HJR-, B/L LE trace to 1+ edema. • Echo 8/28/23: LVEF 60%.  D1DD.     Plan:  • CMP, magnesium; Goal Mg > 2 and K > 4; Replete prn  • HOB > 30°, Daily standing weights, Measure I/O  • Lasix 20 mg challenge if BP stable. Ambulatory dysfunction  Assessment & Plan  PT/ OT eval before discharge. Pain syndrome, chronic  Assessment & Plan  · Home regimen: Oxycodone 5 mg twice daily as needed. Tylenol 650 mg every 8 hours as needed. Gabapentin 600 mg 3 times daily. Medical marijuana. · Per daughter, patient was overusing Tylenol over-the-counter. · Pain mostly from lumbar radiculopathy. · Impaired ambulatory dysfunction second to pain. Plan:  · Restarted gabapentin 300 3 times daily, scheduled oxycodone 5 mg 3 times daily. Hyperlipidemia  Assessment & Plan  Lab Results   Component Value Date    CHOLESTEROL 203 (H) 01/24/2023    CHOLESTEROL 177 08/11/2022    CHOLESTEROL 184 07/08/2020     Lab Results   Component Value Date    HDL 45 (L) 01/24/2023    HDL 37 (L) 08/11/2022    HDL 44 (L) 07/08/2020     Lab Results   Component Value Date    TRIG 166 (H) 09/16/2023    TRIG 160 (H) 01/24/2023    TRIG 108 08/11/2022     Lab Results   Component Value Date    NONHDLC 146 07/12/2018    [address] (H) 04/29/2013     Continue outpatient diet/ lifestyle modification. Benign essential hypertension  Assessment & Plan  Well controlled at home. Home meds: Amlodipine 10 mg daily, Coreg 25 mg twice daily, lisinopril 10 mg daily. Currently on amlodipine 10 mg daily with somewhat controlled blood pressure. Have used hydralazine for further control. Blister (nonthermal) of left forearm, sequela  Assessment & Plan  · 9/17/23: Blisters of LUE noted, no signs of infection  · Daily wound care, monitor signs of infection            ICU Core Measures     Vented Patient  VAP Bundle  VAP bundle ordered     A: Assess, Prevent, and Manage Pain · Has pain been assessed? Yes  · Need for changes to pain regimen?  No   B: Both Spontaneous Awakening Trials (SATs) and Spontaneous Breathing Trials (SBTs) · Plan to perform spontaneous awakening trial today? Yes   · Plan to perform spontaneous breathing trial today? Yes   · Obvious barriers to extubation? Yes   C: Choice of Sedation · RASS Goal: -3 Moderate Sedation  · Need for changes to sedation or analgesia regimen? No   D: Delirium · CAM-ICU: Unable to perform secondary to Acute cognitive dysfunction   E: Early Mobility  · Plan for early mobility? No   F: Family Engagement · Plan for family engagement today? Yes       Antibiotic Review: Patient on appropriate coverage based on culture data. and Awaiting culture results. Review of Invasive Devices:            Prophylaxis:  VTE VTE covered by:  enoxaparin, Subcutaneous       Stress Ulcer  covered byFamotidine (PF) (PEPCID) injection 20 mg [[id number]]       Subjective   HPI/24hr events: Vitals remained stable, w/ 1 epsiode of relative hypotension o/n. Bronch cultures pending. Received amaro. Trach successful yesterday by ENT. Bx likely malignant, poorly diff. Some blister found on arm, following. Ros negative except noted above. Objective                            Vitals I/O      Most Recent Min/Max in 24hrs   Temp 97.9 °F (36.6 °C) Temp  Min: 97.5 °F (36.4 °C)  Max: 99.3 °F (37.4 °C)   Pulse 72 Pulse  Min: 63  Max: 112   Resp 16 Resp  Min: 13  Max: 33   /78 BP  Min: 83/48  Max: 212/111   O2 Sat 97 % SpO2  Min: 89 %  Max: 100 %      Intake/Output Summary (Last 24 hours) at 9/18/2023 0656  Last data filed at 9/18/2023 0600  Gross per 24 hour   Intake 1548.88 ml   Output 3300 ml   Net -1751.12 ml         Diet Enteral/Parenteral; Tube Feeding No Oral Diet; Vital AF 1.2; Continuous; 40; 100; Water; Every 6 hours     Invasive Monitoring Physical exam    Physical Exam  Eyes:      Conjunctiva/sclera: Conjunctivae normal.   Skin:     General: Skin is warm and dry. HENT:      Head: Normocephalic and atraumatic. Nose: Rhinorrhea present. No congestion.    Cardiovascular:      Rate and Rhythm: Normal rate and regular rhythm. Abdominal:      Palpations: Abdomen is soft. Constitutional:       Interventions: She is sedated and intubated. Pulmonary:      Effort: She is intubated. Breath sounds: No stridor. No wheezing, rhonchi or rales. Neurological:      Mental Status: She is somnolent and calm. Corneal reflex present, cough reflex and gag reflex intact. Diagnostic Studies      EKG: None recent  Imaging: CT neck I have personally reviewed pertinent reports.        Medications:  Scheduled PRN   amLODIPine, 10 mg, Daily  busPIRone, 30 mg, TID  chlorhexidine, 15 mL, Q12H ASHLEY  enoxaparin, 40 mg, Q24H ASHLEY  famotidine, 20 mg, Q24H ASHLEY  gabapentin, 300 mg, TID  levalbuterol, 1.25 mg, Q6H   And  ipratropium, 0.5 mg, Q6H  nicotine, 21 mg, Daily  nystatin, 500,000 Units, 4x Daily  oxyCODONE, 5 mg, Q8H ASHLEY  senna, 8.8 mg, HS  sertraline, 50 mg, Daily      fentanyl citrate (PF), 50 mcg, Q2H PRN  levalbuterol, 1.25 mg, Q8H PRN  polyethylene glycol, 17 g, Daily PRN       Continuous    dexmedetomidine, 0.1-1.2 mcg/kg/hr, Last Rate: 1.2 mcg/kg/hr (09/18/23 0553)  fentaNYL, 150 mcg/hr, Last Rate: 150 mcg/hr (09/18/23 0119)  lactated ringers, 10 mL/hr         Labs:    CBC    Recent Labs     09/17/23 0436 09/18/23  0559   WBC 10.91* 11.85*   HGB 12.7 13.6   HCT 36.8 41.1    162     BMP    Recent Labs     09/17/23 0436 09/18/23  0559   SODIUM 132* 143   K 4.2 3.9    111*   CO2 15* 26   AGAP 12 6   BUN 21 24   CREATININE 0.83 0.98   CALCIUM 7.4* 8.4       Coags    No recent results     Additional Electrolytes  Recent Labs     09/17/23 0436 09/18/23  0559   MG 2.2 2.3   PHOS 2.2* 2.4          Blood Gas    No recent results  No recent results LFTs  No recent results    Infectious  No recent results  Glucose  Recent Labs     09/17/23 0436 09/18/23  0559   GLUC 86 112               Critical Care Time Delivered: Upon my evaluation, this patient had a high probability of imminent or life-threatening deterioration due to Mass, which required my direct attention, intervention, and personal management. I have personally provided 60 minutes of critical care time, exclusive of procedures, teaching, family meetings, and any prior time recorded by providers other than myself.      Keisha Patterson, DO

## 2023-09-18 NOTE — ASSESSMENT & PLAN NOTE
• POA with acute respiratory failure, SOB. • On physical in [facility] [place] ED: Swollen tongue, swelling soft and hard palate and almost the head of all phalanx is completely closed. Severe angioedema. • Decadron 10 mg, Benadryl 25 mg given. • Initially refused but eventually agreed with intubation. • Prior episode of angioedema in 2020 noted. Suspected coadministration of increase the dose of tramadol and lisinopril. • Per daughter, there is no recent change in medications except Trelegy inhaler, cefdinir. • Etiology: maybe 2/2 oropharyngeal mass compression.       Plan:  • Trach placed 9/17  • Off all drips except precedex 0.6, off vent 9/18 for several hours successfully

## 2023-09-18 NOTE — PROGRESS NOTES
OTOLARYNGOLOGY PROGRESS NOTE    Date of Service: 9/18/2023 7:05 AM    HPI  Patient is a 69 y.o. female w/ recent COVID/pneumonia requiring admission for infectious control earlier this yr, recently dc, [facility] ED on 9/13 due to SOB and hypoxia, w/ limited oral cavity space observed. Pt agreed to intubation for airway management on 9/14. CT imaging demonstrated L naso/oropharyngeal mass w/ some level 3/4 neck LAD, new R middle lobe patchy consolidation. Daughter at bedside answered questions, including pt is an active tobacco user for many yr, and has complained of worsening difficulty w/ breathing, "gum swelling," and discomfort at back of mouth for past 1.5-2wk. Pt's cause of acute difficult airway was attributed to angioedema due to lisinopril, w/ previous "angioedema" episode in 2020. Ddx being cancer vs infectious etiologies. Overnight Events: POD 1 s/p trach    WBC: 11.85 (10.91) on zosyn & vanc    PHYSICAL EXAM:  Vitals:    09/18/23 0600   BP:    Pulse: 72   Resp: 16   Temp: 97.9 °F (36.6 °C)   SpO2: 97%        General: Intubated  HEENT: No neck LAD palpated, 7 cuffed proximal XLT tracheostomy in place  Neurology: Cannot assess neurologic fx  Lungs:  On ventilator  Cardio: RRR, well perfused  Abd: soft, NT, ND       Intake/Output Summary (Last 24 hours) at 9/18/2023 0705  Last data filed at 9/18/2023 0600  Gross per 24 hour   Intake 1548.88 ml   Output 3300 ml   Net -1751.12 ml         LABORATORY    Recent Labs     09/16/23  0545 09/17/23  0436 09/18/23  0559   WBC 12.06* 10.91* 11.85*   HGB 13.6 12.7 13.6   HCT 41.9 36.8 41.1    167 162       Recent Labs     09/16/23  0545 09/17/23  0436 09/18/23  0559   K 4.0 4.2 3.9   * 105 111*   BUN 18 21 24   PHOS 2.7 2.2* 2.4   MG 2.4 2.2 2.3       Invalid input(s): "PTPAT", "PTINR", "APTTMNNM", "APTTPAT"      Patient Active Problem List   Diagnosis   • Benign essential hypertension   • Bipolar I disorder, single manic episode (HCC)   • Disc degeneration, lumbar   • 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   • Hyperlipidemia   • Lumbar radiculopathy   • Myofascial pain syndrome   • Pain syndrome, chronic   • Osteoarthritis of spine with radiculopathy, lumbar region   • Angio-edema, initial encounter   • Abnormal computed tomography angiography (CTA)   • Bipolar disorder, unspecified (HCC)   • Nicotine dependence   • Bone lesion   • Stage 3a chronic kidney disease (HCC)   • Acute respiratory failure with hypoxia (HCC)   • (HFpEF) heart failure with preserved ejection fraction (HCC)   • Pulmonary embolism (HCC)   • Vaginal itching   • Ambulatory dysfunction   • Angioedema   • New onset right middle lobe pneumonia   • PO (acute kidney injury) (HCC)   • Oropharyngeal mass   • Blister (nonthermal) of left forearm, sequela         ASSESSMENT  Patient is a 69 y.o. female w/ acute and chronic problems as above, w/ naso/oropharyngeal mass observed on CT, likely inducing diminished airway space, requiring intubation for airway protection, now POD 1 s/p trach    PLAN  - fu biopsy  - wean vent per ICU  - will need family discussion regarding plan following biopsy results  - rest of care per primary  - ENT continues following

## 2023-09-18 NOTE — NUTRITION
09/18/23 1123   Recommendations/Interventions   Interventions/Recommendations Adjust EN/ PN   Recommendations to Provider Recommend increasing TF goal to: Vital AF 1.2 @ 50ml/hr, can continue water flushes of 100ml q 6 hrs. Provides 1440kcal, 90gPRO, 1373ml free water.

## 2023-09-18 NOTE — CASE MANAGEMENT
Case Management Assessment & Discharge Planning Note    Patient name Marva Garcia  Location ICU 06/ICU 06 MRN [id number]  : 1955 Date 2023       Current Admission Date: 2023  Current Admission Diagnosis:Acute respiratory failure with hypoxia secondary to oropharyngeal mass   Patient Active Problem List    Diagnosis Date Noted   • Encephalopathy 2023   • COPD without exacerbation ([address]) 2023   • Blister (nonthermal) of left forearm, sequela 2023   • 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   • Angioedema 2023   • RML pneumonia 2023   • PO (acute kidney injury) ([address]) 2023   • Vaginal itching 2023   • Ambulatory dysfunction 2023   • Acute respiratory failure with hypoxia secondary to oropharyngeal mass 2023   • (HFpEF) heart failure with preserved ejection fraction ([address]) 2023   • Pulmonary embolism ([address]) 2023   • CKD (chronic kidney disease) stage 3, GFR 30-59 ml/min ([address]) 2021   • Bone lesion 2021   • Nicotine dependence 06/15/2021   • Bipolar disorder, unspecified ([address]) 06/10/2021   • Abnormal computed tomography angiography (CTA) 2020   • Angio-edema, initial encounter 07/10/2020   • Pain syndrome, chronic 2014   • 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   • Disc degeneration, lumbar 2013   • Lumbar radiculopathy 2013   • Myofascial pain syndrome 2013   • Osteoarthritis of spine with radiculopathy, lumbar region 2013   • Benign essential hypertension 2012   • Bipolar I disorder, single manic episode ([address]) 2012   • Hyperlipidemia 2012      LOS (days): 5  Geometric Mean LOS (GMLOS) (days): 24.20  Days to GMLOS:19.2     OBJECTIVE:  PATIENT READMITTED TO HOSPITAL  Risk of Unplanned Readmission Score: 19.78         Current admission status: Inpatient       Preferred Pharmacy:   [facility]/pharmacy #[id number] LIZZY GARAY - [address] [place] [address],4Th Floor. [address] [facility],[address]   [place] [address]  Phone: [phone] Fax: [phone] ([place], [address] - [address]  [address] [facility] [address] [address]  Phone: [phone] Fax: [phone]    Primary Care Provider: Marcos Rosa MD    Primary Insurance: [employer] [facility] HOSPITAL REP  Secondary Insurance: [address] [facility] Proxies    There are no active Health Care Proxies on file. Patient Information  Admitted from[de-identified] Home  Mental Status: Other (Comment)  During Assessment patient was accompanied by: Daughter Nathalie Barcenas)  Assessment information provided by[de-identified] Daughter  Primary Caregiver: Self  Support Systems: Daughter, Family members, [address] of Residence: [facility] [address] [facility] do you live in?: [facility] entry access options.  Select all that apply.: Stairs  Number of steps to enter home.: 3  Type of Current Residence: 2 story home  Upon entering residence, is there a bedroom on the main floor (no further steps)?: Yes  Upon entering residence, is there a bathroom on the main floor (no further steps)?: Yes  Living Arrangements: Lives w/ Daughter    Activities of Daily Living Prior to Admission  Functional Status: Assistance  Completes ADLs independently?: Yes (very little assistance from daughter)  Ambulates independently?: Yes  Does patient use assisted devices?: Yes  Assisted Devices (DME) used: Yvonne Carlton  Does patient currently own DME?: Yes  What DME does the patient currently own?: Yvonne Carlton  Does patient have a history of Outpatient Therapy (PT/OT)?: No  Does the patient have a history of Short-Term Rehab?: No  Does patient have a history of HHC?: Yes  Does patient currently have [address]?: Yes    Current Home Health Care  Type of Current Home Care Services: Home PT, Home OT  [address][de-identified] [place] Provider[de-identified] PCP    Patient Information Continued  Income Source: Pension/FPC  Does patient have prescription coverage?: Yes  Does patient receive dialysis treatments?: No  Does patient have a history of substance abuse?: No  Does patient have a history of Mental Health Diagnosis?: No    Means of Transportation  Means of Transport to Appts[de-identified] Family transport  In the past 12 months, has lack of transportation kept you from medical appointments or from getting medications?: No  In the past 12 months, has lack of transportation kept you from meetings, work, or from getting things needed for daily living?: No  Was application for public transport provided?: N/A    DISCHARGE DETAILS:    Discharge planning discussed with[de-identified] pt daughterMartine of Choice: Yes  Comments - Freedom of Choice: STR    Contacts  Patient Contacts: Linnette-daughter  Relationship to Patient[de-identified] Family  Contact Method: Phone  Reason/Outcome: Continuity of Care, Emergency Contact, Discharge Planning    Other Referral/Resources/Interventions Provided:  Referral Comments: CM met with pt daughter, Janie Albert, at bedside. Introduced self/role with dcp. CM discussed anticipated need for STR prior to pt returning home. Janie Palaciobreann feels this would be for the best. Linnette aware if pt does do well and can return home that the plan can be changed to [address] with DME needed at d/c. Janie Albert reports she is a paid caregiver for the pt for 40 hours per week, but she works during the day at a full time job and assists pt in the evening/weekends. Janie Carpbreann feels it would be best for pt hours to increase for HHA support during the day. Aware CM will also reach out to pt's , Ralph Angeles, to discuss same. Aware once pt weans from the VENT that CM can place STR referrals. Janie Albert reports her goal is for pt to remain close to home. Linnette aware that if pt is unable to wean from the V[facility] That an LTACH referral can be placed. CM left VM For pt supports coordinator, Ralph Angeles.

## 2023-09-18 NOTE — CONSULTS
Medical Oncology/Hematology Consult Note  Irene Ley, female, 69 y.o., 1955,  ICU 06/ICU 06, [id number]     Reason for consultation: "Biopsy of neck mass with poorly differentiated carcinoma"     History of present illness:  Irene Ley is a 69 y.o. female PMH remarkable of Nicotine dependence, COPD/emphysema, HTN, recent PNA (post-COVID), HFpEF. Presented to ED 9/13/23 with respiratory distress, SpO2 83%, tongue swelling/angioedema was suspected on admission, found to have large nasopharyngeal mass, with airway compression, s/p intubation, then tracheostomy per ENT 09/17    Assessment and Plan  1. Left Neck Mass, oropharyngeal with extension into the nasopharynx     09/13 CTA Chest Rt middle lobe consolidation (PNA), slight regression of previous hilar and mediastinal LNs (had recent PNA in August 2023)     09/14 CT Soft Tissue Neck w contrast: soft tissue mass ~ 6.1 x 4.7 x 5.2 cm appears to be centered within Lt oropharynx involving multiple spaces and extends into the nasopharynx. .. multiple small jugular chain nodes on the left.     09/17 nasopharynx mass biopsy initial results positive for malignancy favor poorly differentiated carcinoma. Cannot exclude lymphoma. Flow cytometry pending. Plan and recommendations:     Differentials include squamous cell Oropharyngeal carcinoma (more likely) vs other H&N malignancies or lymphoma; no final diagnosis, pending pathology/cytometry. - follow up final pathology results, flow cytometry and P16 (HPV) status.      - if confirmed Oropharyngeal cancer, clinically at least [id number], concurrent Chemo/Radx with the preferred regimen per NCCN guidelines v.2.2023 as following:        - pending path results, consider Rad Oncology consultation - discussed with Dr. Ayanna Guillen and [address] [place] [address] team; recs for MRI brain & facial/neck , given local invasion and r/o brain mets; MRI ordered , will follow results     - ENT on board, input appreciated, s/p Tracheostomy 9/17 ___________________________________    Outpatient follow up plan: f/u scheduled with Dr. Benny Villarreal on 10/03/2023     Communication with patient/family:  I did update the patient, as well as her daughter at bedside    Communication with team:  Did communicate with Rad Oncology (Dr. Bri Jacobo) and CC Dr. Cornelio Whitney , primary team.    I did review this patient with my attending Dr. Sherita Velazquez and they are in agreement with this plan. Please see attestation above when available for more details. _________________________________________    Subjective ; Patient sedated at ICU , Judah Stewart in place   Met the daughter at bedside this afternoon   Discussed above A&P with her , all questions answered    Patient lives with the daughter ; has a son as well   Up to last month admission with PNA patient was overall independent ECOG 0-1 ; just bought a new car to go out for shopping / others by herself while daughter at  Work    Per daughter ; the patient noted some sore discomfort last month but was around same time of her COVID/PNA ,so the oropharyngeal mass was not discovered till this ED visit and ENT eval .    She lean toward treatment , including palliative systemic therapy/radiation if pathology to confirm malignancy; she was also made aware of other options to consider including comfort care, and she is to discuss further with her brother, however she noted that the brother deferred the decision making to her, the daughter . I explained and confirmed that no final diagnosis yet pending pathology, she understand. All questions answered appropriately, discussed with critical care and radiation oncology team as above, medical oncology will continue to follow and update. Review of Systems:   Review of Systems   Unable to perform as patient is sedated at [facility]     Oncology History:   Cancer Staging   No matching staging information was found for the patient. Oncology History    No history exists.        Past Medical History:   Past Medical History:   Diagnosis Date   • Anxiety    • Depression    • Fatty liver    • Hyperlipidemia    • Hypertension    • Psychiatric disorder    • Varicella        Past Surgical History:   Procedure Laterality Date   • BREAST SURGERY Bilateral     Reduction Procedure   • CARDIAC SURGERY     •  SECTION      x4   • DILATION AND CURETTAGE OF UTERUS     • ECTOPIC PREGNANCY SURGERY     •  [address] INCL FLUOR GDNCE DX W/CELL WASHG SPX N/A 2023    Procedure: BRONCHOSCOPY FLEXIBLE;  Surgeon: Makenna Haro MD;  Location: [facility];  Service: ENT   • TRACHEOSTOMY N/A 2023    Procedure: TRACHEOSTOMY, biopsy of nasopharynx mass;  Surgeon: Makenna Haro MD;  Location[facility];  Service: ENT       Family History   Problem Relation Age of Onset   • Lung cancer Mother    • Cirrhosis Father    • Hypertension Sister    • Bipolar disorder Sister    • Heart disease Sister    • Diabetes Family    • Heart disease Family    • Hypertension Family    • Stroke Family    • Thyroid disease Family        Social History     Socioeconomic History   • Marital status:      Spouse name: Not on file   • Number of children: Not on file   • Years of education: Not on file   • Highest education level: Not on file   Occupational History   • Occupation: retired   Tobacco Use   • Smoking status: Every Day     Packs/day: 1.00     Types: Cigarettes   • Smokeless tobacco: Never   • Tobacco comments:     2 Black and milds per day   Vaping Use   • Vaping Use: Never used   Substance and Sexual Activity   • Alcohol use: Not Currently   • Drug use: Yes     Types: Marijuana     Comment: for pain   • Sexual activity: Not Currently     Partners: Male     Birth control/protection: None   Other Topics Concern   • Not on file   Social History Narrative    Educational level-special ed/completed Master's Degree     Social Determinants of Health     Financial Resource Strain: Low Risk  (2023)    Overall Financial Resource Strain (CARDIA)    • Difficulty of Paying Living Expenses: Not hard at all   Food Insecurity: Not on file   Transportation Needs: No Transportation Needs (8/28/2023)    PRAPARE - Transportation    • Lack of Transportation (Medical): No    • Lack of Transportation (Non-Medical):  No   Physical Activity: Not on file   Stress: Not on file   Social Connections: Not on file   Intimate Partner Violence: Not on file   Housing Stability: Not on file         Current Facility-Administered Medications:   •  amLODIPine (NORVASC) tablet 10 mg, 10 mg, Oral, Daily, Basim Preciado DO, 10 mg at 09/17/23 1307  •  busPIRone (BUSPAR) tablet 30 mg, 30 mg, Oral, TID, Makenna Haro MD, 30 mg at 09/17/23 2116  •  chlorhexidine (PERIDEX) 0.12 % oral rinse 15 mL, 15 mL, Mouth/Throat, Q12H [facility], Makenna Haro MD, 15 mL at 09/17/23 2116  •  dexmedeTOMIDine (Precedex) 400 mcg in sodium chloride 0.9% 100 mL, 0.1-1.2 mcg/kg/hr, Intravenous, Titrated, Marilia Sánchez, Last Rate: 23.6 mL/hr at 09/18/23 0553, 1.2 mcg/kg/hr at 09/18/23 0553  •  enoxaparin (LOVENOX) subcutaneous injection 40 mg, 40 mg, Subcutaneous, Q24H [facility], Makenna Haro MD  •  Famotidine (PF) (PEPCID) injection 20 mg, 20 mg, Intravenous, Q24H [facility], Makenna Haro MD, 20 mg at 09/17/23 1031  •  fentaNYL 1000 mcg in sodium chloride 0.9% 100mL infusion, 150 mcg/hr, Intravenous, Continuous, Makenna Haro MD, Last Rate: 15 mL/hr at 09/18/23 0807, 150 mcg/hr at 09/18/23 0807  •  fentanyl citrate (PF) 100 MCG/2ML 50 mcg, 50 mcg, Intravenous, Q2H PRN, Makenna Haro MD, 50 mcg at 09/18/23 2315  •  gabapentin (NEURONTIN) capsule 300 mg, 300 mg, Oral, TID, Makenna Haro MD, 300 mg at 09/17/23 2116  •  levalbuterol (XOPENEX) inhalation solution 1.25 mg, 1.25 mg, Nebulization, Q6H, 1.25 mg at 09/18/23 0248 **AND** ipratropium (ATROVENT) 0.02 % inhalation solution 0.5 mg, 0.5 mg, Nebulization, Q6H, Makenna Haro MD, 0.5 mg at 09/18/23 0248  •  lactated ringers infusion, 10 mL/hr, Intravenous, Eric, Em Fernandez CRNA  •  levalbuterol [facility]) inhalation solution 1.25 mg, 1.25 mg, Nebulization, Q8H PRN, Blaine Agudelo MD, 1.25 mg at 09/13/23 1942  •  nicotine (NICODERM CQ) 21 mg/24 hr TD 24 hr patch 21 mg, 21 mg, Transdermal, Daily, Blaine Agudelo MD, 21 mg at 09/17/23 1029  •  nystatin (MYCOSTATIN) oral suspension 500,000 Units, 500,000 Units, Swish & Swallow, 4x Daily, Blaine Agudelo MD, 500,000 Units at 09/17/23 2116  •  oxyCODONE (ROXICODONE) IR tablet 5 mg, 5 mg, Oral, Q8H [address], Blaine Agudelo MD, 5 mg at 09/18/23 0557  •  [COMPLETED] polyethylene glycol (MIRALAX) packet 17 g, 17 g, Oral, Once, 17 g at 09/16/23 1053 **FOLLOWED BY** polyethylene glycol (MIRALAX) packet 17 g, 17 g, Oral, Daily PRN, Blaine Agudelo MD, 17 g at 09/17/23 1545  •  senna oral syrup 8.8 mg, 8.8 mg, Oral, HS, Blaine Agudelo MD, 8.8 mg at 09/17/23 2116  •  sertraline (ZOLOFT) tablet 50 mg, 50 mg, Oral, Daily, Blaine Agudelo MD, 50 mg at 09/17/23 1307    Medications Prior to Admission   Medication   • acetaminophen (TYLENOL) 650 mg CR tablet   • albuterol (PROVENTIL HFA,VENTOLIN HFA) 90 mcg/act inhaler   • amLODIPine (NORVASC) 10 mg tablet   • ASPIRIN-ACETAMINOPHEN-CAFFEINE PO   • busPIRone (BUSPAR) 30 MG tablet   • Calcium Carb-Cholecalciferol (OSCAL-D) 500 mg-200 units per tablet   • carvedilol (COREG) 25 mg tablet   • diclofenac sodium (VOLTAREN) 1 %   • EPINEPHrine (EPIPEN) 0.3 mg/0.3 mL SOAJ   • famotidine (PEPCID) 20 mg tablet   • fluticasone (FLONASE) 50 mcg/act nasal spray   • fluticasone-umeclidinium-vilanterol (Trelegy Ellipta) 100-62.5-25 mcg/actuation inhaler   • gabapentin (NEURONTIN) 600 MG tablet   • loratadine (CLARITIN) 10 mg tablet   • miconazole (MONISTAT-7) 2 % vaginal cream   • naloxone (NARCAN) 4 mg/0.1 mL nasal spray   • nicotine (NICODERM CQ) 7 mg/24hr TD 24 hr patch   • NON FORMULARY   • oxyCODONE (OXY-IR) 5 MG capsule   • sertraline (ZOLOFT) 50 mg tablet       Allergies   Allergen Reactions • Methyldopa Shortness Of Breath and Other (See Comments)     Aldomet   • Lisinopril Angioedema     Came to the emergency room with diffuse tongue swelling, unable to breathe. Intubated with glide scope, transferred to ICU at [facility], started on steroids, Pepcid, Benadryl. Only culprit so far is lisinopril         Physical Exam:     /59 (BP Location: Right arm)   Pulse 66   Temp 98.1 °F (36.7 °C) (Probe)   Resp 17   Wt 78.7 kg (173 lb 8 oz)   SpO2 92%   BMI 32.78 kg/m²     Physical Exam  Vitals reviewed. Constitutional:       General: She is not in acute distress. Appearance: She is ill-appearing. She is not diaphoretic. Comments: Appears ill, sedated, in no acute distress   HENT:      Head: Normocephalic and atraumatic. Right Ear: External ear normal.      Left Ear: External ear normal.      Nose: Nose normal.      Mouth/Throat:      Mouth: Mucous membranes are moist.      Pharynx: Oropharynx is clear. Eyes:      Extraocular Movements: Extraocular movements intact. Pupils: Pupils are equal, round, and reactive to light. Neck:      Comments: Trach in place  Cardiovascular:      Rate and Rhythm: Normal rate and regular rhythm. Pulses: Normal pulses. Heart sounds: No murmur heard. No gallop. Pulmonary:      Effort: Pulmonary effort is normal. No respiratory distress. Breath sounds: No wheezing or rales. Abdominal:      General: Bowel sounds are normal. There is no distension. Palpations: Abdomen is soft. Tenderness: There is no abdominal tenderness. Musculoskeletal:      Right lower leg: No edema. Left lower leg: No edema. Skin:     General: Skin is warm. Capillary Refill: Capillary refill takes less than 2 seconds. Coloration: Skin is not jaundiced or pale. Findings: No lesion or rash. Neurological:      Mental Status: She is disoriented.            Recent Results (from the past 48 hour(s))   Phosphorus Collection Time: 09/17/23  4:36 AM   Result Value Ref Range    Phosphorus 2.2 (L) 2.3 - 4.1 mg/dL   Magnesium    Collection Time: 09/17/23  4:36 AM   Result Value Ref Range    Magnesium 2.2 1.9 - 2.7 mg/dL   Basic metabolic panel    Collection Time: 09/17/23  4:36 AM   Result Value Ref Range    Sodium 132 (L) 135 - 147 mmol/L    Potassium 4.2 3.5 - 5.3 mmol/L    Chloride 105 96 - 108 mmol/L    CO2 15 (L) 21 - 32 mmol/L    ANION GAP 12 mmol/L    BUN 21 5 - 25 mg/dL    Creatinine 0.83 0.60 - 1.30 mg/dL    Glucose 86 65 - 140 mg/dL    Calcium 7.4 (L) 8.4 - 10.2 mg/dL    eGFR 72 ml/min/1.73sq m   CBC    Collection Time: 09/17/23  4:36 AM   Result Value Ref Range    WBC 10.91 (H) 4.31 - 10.16 Thousand/uL    RBC 3.79 (L) 3.81 - 5.12 Million/uL    Hemoglobin 12.7 11.5 - 15.4 g/dL    Hematocrit 36.8 34.8 - 46.1 %    MCV 97 82 - 98 fL    MCH 33.5 26.8 - 34.3 pg    MCHC 34.5 31.4 - 37.4 g/dL    RDW 16.2 (H) 11.6 - 15.1 %    Platelets 537 530 - 802 Thousands/uL    MPV 9.8 8.9 - 12.7 fL   Tissue Exam    Collection Time: 09/17/23  9:27 AM   Result Value Ref Range    Case Report       Surgical Pathology Report                         Case: [id number]                                   Authorizing Provider:  Clotilde North MD          Collected:           09/17/2023 0927              Ordering Location:     [facility]        Received:            09/17/2023 [address] [facility] Intensive Care                                                                             Unit                                                                         Pathologist:           Ryan Rubin MD                                                         Intraop:               Ryan Rubin MD                                                         Specimen:    Lymph Node - Lymphoma Prtocol, nasopharynx mass                                            Synoptic Checklist      Intraoperative Consultation AF1:  Positive for malignancy, favor poorly differentiated carcinoma. Cannot entirely exclude a high grade large cell lymphoma. Portion of specimen submitted for flow cytometry.     Reviewed by Bhavik Lopez M.D.  - Interpretation performed at [facility] [address]          Basic metabolic panel    Collection Time: 09/18/23  5:59 AM   Result Value Ref Range    Sodium 143 135 - 147 mmol/L    Potassium 3.9 3.5 - 5.3 mmol/L    Chloride 111 (H) 96 - 108 mmol/L    CO2 26 21 - 32 mmol/L    ANION GAP 6 mmol/L    BUN 24 5 - 25 mg/dL    Creatinine 0.98 0.60 - 1.30 mg/dL    Glucose 112 65 - 140 mg/dL    Calcium 8.4 8.4 - 10.2 mg/dL    eGFR 59 ml/min/1.73sq m   CBC and differential    Collection Time: 09/18/23  5:59 AM   Result Value Ref Range    WBC 11.85 (H) 4.31 - 10.16 Thousand/uL    RBC 4.25 3.81 - 5.12 Million/uL    Hemoglobin 13.6 11.5 - 15.4 g/dL    Hematocrit 41.1 34.8 - 46.1 %    MCV 97 82 - 98 fL    MCH 32.0 26.8 - 34.3 pg    MCHC 33.1 31.4 - 37.4 g/dL    RDW 17.0 (H) 11.6 - 15.1 %    MPV 10.1 8.9 - 12.7 fL    Platelets 674 723 - 189 Thousands/uL    nRBC 0 /100 WBCs    Neutrophils Relative 80 (H) 43 - 75 %    Immat GRANS % 1 0 - 2 %    Lymphocytes Relative 11 (L) 14 - 44 %    Monocytes Relative 8 4 - 12 %    Eosinophils Relative 0 0 - 6 %    Basophils Relative 0 0 - 1 %    Neutrophils Absolute 9.58 (H) 1.85 - 7.62 Thousands/µL    Immature Grans Absolute 0.07 0.00 - 0.20 Thousand/uL    Lymphocytes Absolute 1.24 0.60 - 4.47 Thousands/µL    Monocytes Absolute 0.90 0.17 - 1.22 Thousand/µL    Eosinophils Absolute 0.05 0.00 - 0.61 Thousand/µL    Basophils Absolute 0.01 0.00 - 0.10 Thousands/µL   Magnesium    Collection Time: 09/18/23  5:59 AM   Result Value Ref Range    Magnesium 2.3 1.9 - 2.7 mg/dL   Phosphorus    Collection Time: 09/18/23  5:59 AM   Result Value Ref Range    Phosphorus 2.4 2.3 - 4.1 mg/dL       CT soft tissue neck w contrast    Result Date: 9/14/2023  Narrative: CT NECK WITH CONTRAST INDICATION:   Laryngeal edema COMPARISON:  None. TECHNIQUE:  Axial, sagittal, and coronal 2D reformatted images were created from the axial source data and submitted for interpretation. Radiation dose length product (DLP) for this visit:  769 mGy-cm . This examination, like all CT scans performed in the [facility], was performed utilizing techniques to minimize radiation dose exposure, including the use of iterative reconstruction and automated exposure control. IV Contrast:  85 mL of iohexol (OMNIPAQUE) IMAGE QUALITY:  Diagnostic. FINDINGS: VISUALIZED BRAIN PARENCHYMA:  No acute intracranial pathology of the visualized brain parenchyma. VISUALIZED ORBITS: Normal visualized orbits. PARANASAL SINUSES: Normal visualized paranasal sinuses. Nasopharynx, oropharynx AND HYPOPHARYNX: There is a large heterogeneously enhancing mass identified within the neck involving multiple spaces. The origin is difficult to ascertain given the large size. This mass measures approximately 6.1 x 4.7 x 5.2 cm and appears to be centered within the left oropharynx. The mass extends superiorly into the nasopharynx left more so than right and into the posterior aspect of the nasal cavity. The mass also extends across the midline within the oropharynx and inferiorly into the left lateral oropharynx but not below the laryngeal ventricle. The mass extends laterally into the infratemporal fossa and parapharyngeal fat and is inseparable from infratemporal musculature and deep lobe of the parotid gland. There is extension into the prevertebral space where the mass is inseparable from the anterior paraspinal muscle on the left and posteriorly and laterally into the carotid space where the mass is displacing and inseparable from jugular and carotid.  The mass encases the cervical internal carotid artery just below the level of the skull base resulting in narrowing of the vessel and the mass compresses and occludes the jugular vein below the skull base. The vessel does reconstitute via collateral vessels as it  extends inferiorly within the neck. The mass extends superiorly into the skull base inseparable from the carotid canal and jugular foramen. There is severe airway compromise within the posterior oral cavity and superior oropharynx. ET tube and OG tube are present. THYROID GLAND:  Unremarkable. PAROTID AND SUBMANDIBULAR GLANDS: Normal parotid and submandibular glands other than the mass abutting the deep lobe of the left parotid gland. LYMPH NODES: Multiple small jugular chain nodes are seen on the left. VASCULAR STRUCTURES: As described above the mass partially encases the cervical internal carotid artery, narrowing the vessel and occludes the jugular vein from the skull base to the mid neck. Below this the vessel is unremarkable due to collateral reconstitution. THORACIC INLET: Posterior left upper lobe consolidation may represent atelectasis or pneumonia. Mild patchy groundglass opacity within the upper lung fields. BONY STRUCTURES: At T1-2 there is a left paramedian bridging osteophyte resulting in mild to moderate left anterior lateral canal stenosis. Impression: Large enhancing soft tissue mass identified within the left neck involving multiple spaces. This appears to be centered within the left oropharynx with extensions as described above into multiple spaces. This results in significant airway compromise. 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 Small level 3 and level 4 nodes are seen within the neck. I personally discussed this study with ICU resident on 9/14/2023 4:44 PM. Workstation performed: [id number]     Bronchoscopy    Result Date: 9/14/2023  Narrative: Table formatting from the original result was not included.  [address] [place] [id number] [phone] DATE OF SERVICE: 9/14/23 PHYSICIAN(S): Attending: No Staff Documented Fellow: No Staff Documented INDICATION: Acute respiratory failure with hypoxia (HCC) POST-OP DIAGNOSIS: See the impression below. PREPROCEDURE: Standard airway preparation completed per respiratory therapy protocol. Informed consent was obtained. Images reviewed prior to the procedure. A Time Out was performed. No suspicion or identified risk for TB or other airborne infectious disease; bronchoscopy procedure being performed for diagnostic purposes. PROCEDURE: Bronchoscopy DETAILS OF PROCEDURE: Patient was taken to the procedure room where a time out was performed to confirm correct patient and correct procedure. The patient was already under sedation prior to the procedure. The patient's blood pressure, ECG, heart rate, level of consciousness, respirations and oxygen were monitored throughout the procedure. The patient experienced no blood loss. The scope was introduced through the endotracheal tube. The procedure was moderately difficult due to patient intolerance and persistent coughing. In response to procedure difficulty, deeper sedation was employed. The patient tolerated the procedure well. There were no apparent adverse events. ANESTHESIA INFORMATION: ASA: ASA status not filed in the log. Anesthesia Type: Anesthesia type not filed in the log.  FINDINGS: The lower trachea, main sujata, left main stem, KARTHIK, lingula, LLL, right main stem, RUL, bronchus intermedius, RML and RLL appeared normal. Left lower lung zone with no endobronchial lesions, left upper and lingula both appear normal Right upper, right middle and right lower lobes all appeared normal with no endobronchial lesions Endotracheal tube was retracted 3 cm under direct visualization with the bronchoscope Bronchoalveolar lavage was performed x1 in the right lateral segment (RB4) with 60 mL of saline instilled and a total return of 40 mL; the fluid appeared cloudy SPECIMENS: ID Type Source Tests Collected by Time Destination 1 :  Lavage Lung, Right Middle Lobe Bronchoalveolar Lavage PULMONARY CYTOLOGY Starla Lopez MD 9/14/2023 12:55 PM  A :  Bronchial Lung, Right Middle Lobe Bronchoalveolar Lavage FUNGAL CULTURE, VIRUS CULTURE, BRONCHIAL CULTURE AND GRAM STAIN, LEGIONELLA CULTURE, PNEUMOCYSTIS SMEAR BY DFA ([facility]), AFB CULTURE WITH STAIN Starla Lopez MD 9/14/2023 12:57 PM       Impression: BAL of the right middle lobe Endotracheal tube was retracted under direct visualization Tongue still appears swollen, vocal cords visualized outside of the endotracheal tube with the bronchoscope, no significant edema or mass noted RECOMMENDATION:  Follow-up infectious work-up      XR chest 1 view portable    Result Date: 9/13/2023  Narrative: CHEST INDICATION:   post intubation. COMPARISON: Same day chest radiograph and subsequent same day CT chest. Chest x-ray 8/27/2023. EXAM PERFORMED/VIEWS:  XR CHEST PORTABLE FINDINGS: Endotracheal tube terminates approximately 4 cm above the sujata. Heart shadow is enlarged but unchanged from prior exam. Bibasilar airspace opacities are again demonstrated. No appreciable pneumothorax. No evidence of acute osseous abnormality. Lucent lesion within T12 is better demonstrated on same-day CT. Impression: 1. Endotracheal tube terminates 4 cm above the sujata. 2. Redemonstration of bibasilar airspace opacities. Workstation performed: [id number]     XR chest 1 view portable    Result Date: 9/13/2023  Narrative: CHEST INDICATION:   post intubation, adjusting ET Tube. COMPARISON:  None EXAM PERFORMED/VIEWS:  XR CHEST PORTABLE FINDINGS: Endotracheal tube terminates approximately 3 cm above the sujata. Cardiomediastinal silhouette appears unremarkable. Bibasilar airspace opacities are again noted. No appreciable pneumothorax or pleural effusion. No evidence of acute osseous abnormality. Lucent lesion within T12 is better demonstrated on same-day CT. Impression: 1. Endotracheal tube terminates 3.3 cm above the sujata.  2. Bibasilar airspace opacities are again demonstrated. Workstation performed: [id number]     XR chest portable    Result Date: 9/13/2023  Narrative: CHEST INDICATION:   sob. COMPARISON: 8/27/2023. Subsequent CT chest performed the same day. EXAM PERFORMED/VIEWS:  XR CHEST PORTABLE FINDINGS: Heart shadow is enlarged but unchanged from prior exam. Hazy opacities are noted within the right lung base, possibly atelectasis versus pneumonia. Left basilar opacity is similar to prior radiograph. No appreciable pneumothorax. No evidence of acute osseous abnormality. Cystic lesion within the T12 vertebral body is better characterized on same-day CT. Impression: 1. Hazy bibasilar airspace opacities which may represent atelectasis versus pneumonia. Left basilar opacity is similar to recent radiograph while right basilar opacity is new Workstation performed: [id number]     CTA ED chest PE Study    Result Date: 9/13/2023  Narrative: CTA - CHEST WITH IV CONTRAST - PULMONARY ANGIOGRAM INDICATION:   R/O PE. Reports shortness of breath since being discharged from the hospital 3 weeks ago for pneumonia. Fatigue. Productive cough with white sputum. Angioedema. COMPARISON: Chest radiograph 9/13/2023, chest CT 8/25/2023, thoracic spine CT 11/13/2013. TECHNIQUE: CT angiogram timed for optimal opacification of the pulmonary arteries. Axial, sagittal, and coronal 2D reformats created from source data. Coronal 3D MIP postprocessing on the acquisition scanner. Radiation dose length product (DLP):  472 mGy-cm . Radiation dose exposure minimized using iterative reconstruction and automated exposure control. IV Contrast:  85 mL of iohexol (OMNIPAQUE) FINDINGS: PULMONARY ARTERIES:  No pulmonary embolus. Moderate pulmonary artery enlargement. LUNGS: Mild patchy new consolidation in the medial segment right middle lobe. Improving opacity in the left upper lobe with mild residual atelectasis/scar. Redemonstration of mild dependent atelectasis in both lower lobes.  Severe emphysema. AIRWAYS: No significant filling defects. ET tube 4 cm above the sujata. PLEURA:  Unremarkable. HEART/GREAT VESSELS: Moderate cardiomegaly. MEDIASTINUM AND PRASANTH: Slight regression of hilar and mediastinal lymphadenopathy. The largest node was previously 2.5 x 2.0 cm in the right infrahilar region and is now 1.9 x 1.3 cm (501/94). CHEST WALL AND LOWER NECK: Unremarkable. UPPER ABDOMEN: Right renal cysts. OSSEOUS STRUCTURES: Redemonstration of the large cystic lesion with sclerotic margins in T12 with destruction of the superior and inferior endplates, present as a much smaller thin-walled cystic lesion on the thoracic spine CT from 2013, imaged on a thoracic spine MRI from 2020. Impression: No pulmonary embolus. Patchy consolidation right middle lobe, new from 8/25/2023, compatible with pneumonia. Slight regression of previous hilar and mediastinal lymphadenopathy. Improving left upper lobe opacity which may have been due to pneumonia with residual atelectasis/scar. Persistent partial atelectasis of the lower lobes. 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 Workstation performed: [id number]     Echo complete w/ contrast if indicated    Result Date: 8/28/2023  Narrative: •  Left Ventricle: Left ventricular cavity size is small. Wall thickness is increased. There is severe concentric hypertrophy. The left ventricular ejection fraction is 60%. Systolic function is normal. Wall motion is normal. Diastolic function is mildly abnormal, consistent with grade I (abnormal) relaxation. There is no LV dynamic obstruction at rest. •  Right Ventricle: Right ventricular cavity size is normal. Systolic function is normal. •  Left Atrium: The atrium is mildly dilated. •  Mitral Valve: There is mild to moderate regurgitation.  There is systolic anterior motion of the chordal apparatus with late peaking gradient 29 mmHg during Valsalva maneuver. •  Pericardium: There is a trivial pericardial effusion along the right atrial free wall. XR chest portable ICU    Result Date: 8/28/2023  Narrative: CHEST INDICATION:   Acute hypoxic respiratory failure. COMPARISON: 8/25/2023 EXAM PERFORMED/VIEWS:  XR CHEST PORTABLE ICU FINDINGS: Heart shadow is enlarged but unchanged from prior exam. There is stable left-sided volume loss secondary to left basilar atelectasis. No pneumothorax or pleural effusion. Osseous structures appear within normal limits for patient age. Impression: Stable volume loss on the left secondary to left lower lobe atelectasis. Workstation performed: [id number]     VAS lower limb venous duplex study, complete bilateral    Result Date: 8/27/2023  Narrative:  [facility] REPORT CLINICAL: Indications: Patient presents with bilateral lower extremity pain x 1 days. Operative History: cardiac surgery-date unknown. Risk Factors The patient has history of HTN and CKD. She has no history of Hyperlipidemia. CONCLUSION:  Impression: RIGHT LOWER LIMB: No evidence of acute or chronic deep vein thrombosis. No evidence of superficial thrombophlebitis noted. Doppler evaluation shows a normal response to augmentation maneuvers. . Popliteal, posterior tibial and anterior tibial arterial Doppler waveform's are triphasic. LEFT LOWER LIMB: No evidence of acute or chronic deep vein thrombosis. No evidence of superficial thrombophlebitis noted. Doppler evaluation shows a normal response to augmentation maneuvers. Popliteal, posterior tibial and anterior tibial arterial Doppler waveform's are triphasic. SIGNATURE: Electronically Signed by: Siobhan Glass on 2023-08-27 08:12:52 PM    CTA ED chest PE Study    Result Date: 8/25/2023  Narrative: CTA - CHEST WITH IV CONTRAST - PULMONARY ANGIOGRAM INDICATION:   Increased SOB, recent COVID diagnosis.  COMPARISON: MRI from 3/18/2020 as well as bone scan from 7/2/2019 and thoracic spine from 11/13/2013 TECHNIQUE: CTA examination of the chest was performed using angiographic technique according to a protocol specifically tailored to evaluate for pulmonary embolism. Multiplanar 2D reformatted images were created from the source data. In addition, coronal 3D MIP postprocessing was performed on the acquisition scanner. The study is limited by some respiratory motion artifacts especially in the lower lobes on the left where there is crowding of vessels due to atelectatic changes. Radiation dose length product (DLP) for this visit:  370 mGy-cm . This examination, like all CT scans performed in the [facility], was performed utilizing techniques to minimize radiation dose exposure, including the use of iterative reconstruction and automated exposure control. IV Contrast:  80 mL of iohexol (OMNIPAQUE) FINDINGS: PULMONARY ARTERIAL TREE: Limited visualization left lower lobe however there is suspicion for a small embolus in the posterobasal segment on axial image 139, series 305 and coronal image 142. No definite filling defect is seen elsewhere. The main pulmonary artery is significantly dilated at 4.2 cm. The RV LV ratio is elevated at 1.1 cm. The primary pulmonary vascular stent minutes are also dilated chronicity is uncertain. LUNGS: Emphysema is noted with blebs at the apices. There is peripheral consolidation in the left upper lobe. Air bronchogram is not well seen and there is mucous occlusion of the left mainstem bronchus with volume loss of the left upper lobe as well as  left lower lobe to a lesser extent. Some aeration in the left lower lobe is still visible. PLEURA:  Unremarkable. HEART/GREAT VESSELS:  Unremarkable for patient's age. No thoracic aortic aneurysm. MEDIASTINUM AND PRASANTH: 10 mm pretracheal node is identified. 9 mm superior mediastinal node is identified. 10 mm prevascular node is identified. Subcarinal node measures 1.5 cm. Hilar adenopathy is also demonstrated.  Right hilar node is larger measuring 1.8 cm in short axis on image 138. CHEST WALL AND LOWER NECK:   Unremarkable. VISUALIZED STRUCTURES IN THE UPPER ABDOMEN: Low-density cyst is seen in the right kidney. . OSSEOUS STRUCTURES: There appears to be a destructive lesion involving the T12 vertebral body eroding both endplates and much of the vertebral body this does not appear to represent a Schmorl's node. A cystic lesion was noted in 2013 at this location however the current lesion is larger with loss of endplates. MRI also imaged this lesion in 2020 the lesion appears somewhat larger on the current examination however. Impression: Limited study. There is equivocal embolus in the posterior basal segment left lower lobe. Other smaller segments are difficult to assess due to motion and vascular crowding. There is mucous plugging in the left mainstem bronchus with volume loss in portions of the left lower lobe and left upper lobe. Aspiration pneumonia is not excluded. There is dilatation of the main pulmonary artery and proximal pulmonary segment suggesting pulmonary hypertension this is more likely chronic than acute given the degree of distention. Emphysema is present. There is significant mediastinal and hilar adenopathy suggesting underlying neoplasm more likely than simple reactive nodes. Enlarging lesion at T12 is again demonstrated of uncertain etiology. This has been present since 2013. Perhaps a plasmacytoma is a consideration. Neoplastic work-up is advised. Pulmonology consultation is also advised to assess endobronchial obstruction on the left. This was discussed with resident physician Dr. Anjelica Gonzales at 4:58 p.m. Follow-up was marked in the epic system Workstation performed: [id number]     XR chest 1 view portable    Result Date: 8/25/2023  Narrative: CHEST INDICATION:   SOB. COMPARISON: 8/21/2021 EXAM PERFORMED/VIEWS:  XR CHEST PORTABLE Single view FINDINGS: Development of CHF.  Probable left basal infiltrate. Cardiomediastinal silhouette has become more enlarged. No pneumothorax or pleural effusion. Osseous structures appear within normal limits for patient age. Impression: Increased cardiomegaly. Development of CHF. Probable left basal infiltrate Workstation performed: [id number]       Labs and pertinent reports reviewed. This note has been generated by voice recognition software system. Therefore, there may be spelling, grammar, and or syntax errors. Please contact if questions arise.     Yaya Kendrick, DO   Hematology and Medical Oncology - PGY [place]

## 2023-09-18 NOTE — ASSESSMENT & PLAN NOTE
Lab Results   Component Value Date    EGFR 59 09/18/2023    EGFR 72 09/17/2023    EGFR 66 09/16/2023    CREATININE 0.98 09/18/2023    CREATININE 0.83 09/17/2023    CREATININE 0.89 09/16/2023     Stable.

## 2023-09-18 NOTE — CONSULTS
Consultation - Palliative and Supportive Care   [facility] SERVICE 69 y.o. female [id number]    Assessment:  Acute respiratory failure with hypoxia  HFpEF  Ambulatory dysfunction  New onset right middle lobe pneumonia  PO  Oropharyngeal mass  Goals of care counseling  Palliative care encounter    Plan:  1. Symptom Management  · Per ICU team at this time (discussed with ICU resident Dr. Estefany Reyna)    2. Goals of Care  • Level 1 code status  • Patient has two adult children: daughter Izabel Romero and son Mey Thomas who share decision making equally  • Discussed goals of care with daughter Izabel Romero who is primary contact at this time. She notes that she and Rita Curry have been in agreement with her mother's care thus far. • She does not believe her mother would have wanted intubation and/or trach, however her mother decided to pursue when there was the risk of cardiac arrest if nothing was done  • Final biopsy results pending. Reached out to primary team to notify that she is requesting medical update regarding biopsy  • If biopsy is positive for malignancy, she believes her mother would wish to pursue cancer-directed thearpies if indicated, but hopes to be able to discuss with her once she is more awake and alert  • She does not believe her mother would wish for rehab, however she will pursue the recommendations of her treatment team   • Her ultimate goal is for her mother to return back home with nursing support   • Goals of care discussions will be ongoing    3. Social Support  • Patient supported by her son Rita Curry and daughter Izabel Romero  • She is , but her ex- has been coming to visit  • She has a cousin coming in from [place] to visit today  • Emotional support provided    4. Follow-up  • Palliative care will continue to follow and goals of care conversations will be ongoing. Please reach out with any questions or concerns.                I have reviewed the patient's controlled substance dispensing history in the Prescription Drug Monitoring Program in compliance with the [facility] regulations before prescribing any controlled substances. Last refills  Filled  Written  Sold  ID  Drug  QTY  Days  Prescriber  RX #  Dispenser  Refill  Daily Dose*  Pymt Type      09/07/2023 09/07/2023   2  Oxycodone Hcl (Ir) 5 Mg Cap 60.00  30  Sh Sha  [id number]   Pen (0539)  0  15.00 MME  [place]    08/09/2023 08/09/2023   2  Oxycodone Hcl (Ir) 5 Mg Cap 60.00  30  Sh Sha  [id number]   Pen (7640)  0  15.00 MME  [place]    07/12/2023 07/11/2023   1  Oxycodone Hcl (Ir) 5 Mg Cap 60.00  30  [address]  [id number]   Pen (4165)  0  15.00 MME  [place]    06/12/2023 06/12/2023   1  Oxycodone Hcl (Ir) 5 Mg Cap 60.00  30  [address]  [id number]   Pen (4292)  0  15.00 MME  [place]        Decisional apparatus:  Patient is not competent on exam today. If competency is lost, patient's substitute decision maker would default to her two adult children by [place] Act 169. In the absence of advanced directives with a designated HCA or POA paperwork, [place] Act 169 would be closely adhered to with the following hierarchy:  1. Spouse - reported  by daughter Mago Hebert  2. Adult children - daughter Mago Hebert and son Souleymane Soria would share decision making equally  3. Parents  4. Siblings   5. Adult grandchildren  10. Any adult who has knowledge of the patient preference and values. Advance Directive/Living Will: None on file  POLST: None on file  POA Forms: None on file    We appreciate the invitation to be involved in this patient's care. We will continue to follow throughout this hospitalization. Please do not hesitate to reach our on call provider through our clinic answering service at [phone] should you have acute symptom control concerns. Ann Marie Polk PA-C  Palliative and Supportive Care  Clinic/Answering Service: [phone]  You can find me on [email or web address]!      IDENTIFICATION:  Inpatient consult to Palliative Care  Consult performed by: [facility] Kaufman, PA-C  Consult ordered by: Arsenio Smith MD        Physician Requesting Consult: Marylee Carpen, MD  Reason for Consult / Principal Problem: GOC counseling and SM secondary to head and neck neoplasm      History of Present Illness:  Ariana Duffy is a 69 y.o. female with PMH of HTN, HLD, fatty liver, tobacco use, chronic pain syndrome, and bipolar disorder who initially presented to the [facility] [place] ED on 9/13/23 with respiratory distress. She had recently been discharged from hospital 3 weeks ago for pneumonia and respiratory failure. Upon further evaluation in ED, patient found to have hypoxia requiring high flow 60 L oxygen, tongue swelling, and severe angioedema. Patient was intubated in ED. CT neck/soft tissue revealed large enhancing soft tissue mass concerned for head and neck neoplasm. Since then, patient has received tracheostomy and biopsy. Biopsy initial results are positive for malignancy with poorly differentiated carcinoma. Final biopsy and MRI pending. Radiation possibly planned for after MRI. Palliative care was consulted for goals of care. Upon my encounter today, patient seen and examined at bedside while respiratory therapy and RN in room. There is no family present during visit. She is resting in bed with trach in place and appears uncomfortable with brow furrowed. She is tearful and is unable to respond to questions due to trach, but does awaken to voice and open her eyes. Called daughter Kemar Murrell to discuss goals of care. Introduced palliative care and our services. Discussed events leading up to hospitalization and daughter's understanding of her mother's current medical condition. She has good understanding, but has not yet received final biopsy results which are pending. Notified her that this writer will reach out to primary with her request for medical update.  If results come back positive for malignancy, she believes her mother would wish to pursue cancer-directed therapies including chemotherapy or radiation. However, she hopes to be able to ask her mother when she is more awake and alert. She expresses that her mother did not wish to be intubated and likely would not have wanted trach, however chose to pursue it when there was risk of cardiac arrest due to her breathing and hypoxia. Her ultimate goal for her mother is for her to return home to living with her with nursing support. She does not believe her mother would want to go to rehab, but wishes to follow recommendations of treatment team. She reports that patient has two children: herself and son Loretta Mcghees. Patient is . She is one of 5 siblings and has sister and cousin who are coming to visit today. Explained [place] Act 169 in depth and that if patient loses competency and in the absence of advanced directives, herself and patient's son would share decision making equally. She does not have any further questions or concerns and will be visiting later today. She has palliative medicine phone number.        Review of Systems   Unable to perform ROS: Acuity of condition       Past Medical History:   Diagnosis Date   • Anxiety    • Depression    • Fatty liver    • Hyperlipidemia    • Hypertension    • Psychiatric disorder    • Varicella      Past Surgical History:   Procedure Laterality Date   • BREAST SURGERY Bilateral     Reduction Procedure   • CARDIAC SURGERY     •  SECTION      x4   • DILATION AND CURETTAGE OF UTERUS     • ECTOPIC PREGNANCY SURGERY     •  [address] INCL FLUOR GDNCE DX W/CELL WASHG SPX N/A 2023    Procedure: Maurilio Farrell;  Surgeon: Hayley Palacios MD;  [facility];  Service: ENT   • TRACHEOSTOMY N/A 2023    Procedure: TRACHEOSTOMY, biopsy of nasopharynx mass;  Surgeon: Hayley Palacios MD;  [facility];  Service: ENT     Social History     Socioeconomic History   • Marital status:      Spouse name: Not on file   • Number of children: Not on file   • Years of education: Not on file   • Highest education level: Not on file   Occupational History   • Occupation: retired   Tobacco Use   • Smoking status: Every Day     Packs/day: 1.00     Types: Cigarettes   • Smokeless tobacco: Never   • Tobacco comments:     2 Black and milds per day   Vaping Use   • Vaping Use: Never used   Substance and Sexual Activity   • Alcohol use: Not Currently   • Drug use: Yes     Types: Marijuana     Comment: for pain   • Sexual activity: Not Currently     Partners: Male     Birth control/protection: None   Other Topics Concern   • Not on file   Social History Narrative    Educational level-special ed/completed Master's Degree     Social Determinants of Health     Financial Resource Strain: Low Risk  (1/5/2023)    Overall Financial Resource Strain (CARDIA)    • Difficulty of Paying Living Expenses: Not hard at all   Food Insecurity: Not on file   Transportation Needs: No Transportation Needs (8/28/2023)    PRAPARE - Transportation    • Lack of Transportation (Medical): No    • Lack of Transportation (Non-Medical):  No   Physical Activity: Not on file   Stress: Not on file   Social Connections: Not on file   Intimate Partner Violence: Not on file   Housing Stability: Not on file     Family History   Problem Relation Age of Onset   • Lung cancer Mother    • Cirrhosis Father    • Hypertension Sister    • Bipolar disorder Sister    • Heart disease Sister    • Diabetes Family    • Heart disease Family    • Hypertension Family    • Stroke Family    • Thyroid disease Family        Medications:  all current active meds have been reviewed, current meds:   Current Facility-Administered Medications   Medication Dose Route Frequency   • amLODIPine (NORVASC) tablet 10 mg  10 mg Oral Daily   • busPIRone (BUSPAR) tablet 30 mg  30 mg Oral TID   • chlorhexidine (PERIDEX) 0.12 % oral rinse 15 mL  15 mL Mouth/Throat Q12H [address]   • dexmedeTOMIDine (Precedex) 400 mcg in sodium chloride 0.9% 100 mL  0.1-1.2 mcg/kg/hr Intravenous Titrated   • enoxaparin (LOVENOX) subcutaneous injection 40 mg  40 mg Subcutaneous Q24H ASHLEY   • Famotidine (PF) (PEPCID) injection 20 mg  20 mg Intravenous Q24H [address]   • fentaNYL 1000 mcg in sodium chloride 0.9% 100mL infusion  150 mcg/hr Intravenous Continuous   • fentanyl citrate (PF) 100 MCG/2ML 50 mcg  50 mcg Intravenous Q2H PRN   • gabapentin (NEURONTIN) capsule 300 mg  300 mg Oral TID   • levalbuterol (XOPENEX) inhalation solution 1.25 mg  1.25 mg Nebulization Q6H    And   • ipratropium (ATROVENT) 0.02 % inhalation solution 0.5 mg  0.5 mg Nebulization Q6H   • lactated ringers infusion  10 mL/hr Intravenous Continuous   • levalbuterol (XOPENEX) inhalation solution 1.25 mg  1.25 mg Nebulization Q8H PRN   • nicotine (NICODERM CQ) 21 mg/24 hr TD 24 hr patch 21 mg  21 mg Transdermal Daily   • nystatin (MYCOSTATIN) oral suspension 500,000 Units  500,000 Units Swish & Swallow 4x Daily   • oxyCODONE (ROXICODONE) IR tablet 5 mg  5 mg Oral Q8H [address]   • polyethylene glycol (MIRALAX) packet 17 g  17 g Oral Daily PRN   • senna oral syrup 8.8 mg  8.8 mg Oral HS   • sertraline (ZOLOFT) tablet 50 mg  50 mg Oral Daily    and PTA meds:   Prior to Admission Medications   Prescriptions Last Dose Informant Patient Reported? Taking?    ASPIRIN-ACETAMINOPHEN-CAFFEINE PO  Self Yes No   Sig: Take by mouth   Calcium Carb-Cholecalciferol (OSCAL-D) 500 mg-200 units per tablet  Self Yes No   Sig: Take 1 tablet by mouth 2 (two) times a day with meals   EPINEPHrine (EPIPEN) 0.3 mg/0.3 mL SOAJ  Self No No   Sig: Inject 0.3 mL (0.3 mg total) into a muscle once for 1 dose   NON FORMULARY  Self Yes No   Sig: Hempvana roll on cream for pain   acetaminophen (TYLENOL) 650 mg CR tablet  Self No No   Sig: Take 1 tablet (650 mg total) by mouth every 8 (eight) hours as needed for moderate pain   albuterol (PROVENTIL HFA,VENTOLIN HFA) 90 mcg/act inhaler  Self No No   Sig: Inhale 2 puffs every 6 (six) hours as needed for wheezing or shortness of breath   amLODIPine (NORVASC) 10 mg tablet  Self No No   Sig: TAKE 1 TABLET BY MOUTH EVERY DAY   busPIRone (BUSPAR) 30 MG tablet  Self No No   Sig: Take 1 tablet (30 mg total) by mouth 3 (three) times a day   carvedilol (COREG) 25 mg tablet  Self No No   Sig: TAKE 1 TABLET BY MOUTH TWICE A DAY WITH FOOD   diclofenac sodium (VOLTAREN) 1 %  Self No No   Sig: APPLY 2 GRAMS TO AFFECTED AREA 4 TIMES A DAY   famotidine (PEPCID) 20 mg tablet   No No   Sig: Take 1 tablet (20 mg total) by mouth 2 (two) times a day   fluticasone (FLONASE) 50 mcg/act nasal spray  Self No No   Sig: SPRAY 2 SPRAYS INTO EACH NOSTRIL EVERY DAY   fluticasone-umeclidinium-vilanterol (Trelegy Ellipta) 100-62.5-25 mcg/actuation inhaler  Self No No   Sig: Inhale 1 puff daily Rinse mouth after use.   gabapentin (NEURONTIN) 600 MG tablet  Self No No   Sig: Take 1 tablet (600 mg total) by mouth 3 (three) times a day   loratadine (CLARITIN) 10 mg tablet  Self No No   Sig: TAKE 1 TABLET BY MOUTH EVERY DAY   miconazole (MONISTAT-7) 2 % vaginal cream  Self No No   Sig: Insert 1 applicator into the vagina daily at bedtime   naloxone (NARCAN) 4 mg/0.1 mL nasal spray   No No   Sig: Administer 1 spray into a nostril. If no response after 2-3 minutes, give another dose in the other nostril using a new spray. nicotine (NICODERM CQ) 7 mg/24hr TD 24 hr patch   No No   Sig: Place 1 patch on the skin over 24 hours every 24 hours   oxyCODONE (OXY-IR) 5 MG capsule   No No   Sig: Take 1 capsule (5 mg total) by mouth 2 (two) times a day as needed for moderate pain Max Daily Amount: 10 mg   sertraline (ZOLOFT) 50 mg tablet  Self No No   Sig: TAKE 1 TABLET BY MOUTH EVERY DAY      Facility-Administered Medications: None       Allergies   Allergen Reactions   • Methyldopa Shortness Of Breath and Other (See Comments)     Aldomet   • Lisinopril Angioedema     Came to the emergency room with diffuse tongue swelling, unable to breathe.   Intubated with glide scope, transferred to ICU at [facility], started on steroids, Pepcid, Benadryl. Only culprit so far is lisinopril       Objective:  /59 (BP Location: Right arm)   Pulse 66   Temp 98.1 °F (36.7 °C) (Probe)   Resp 17   Wt 78.7 kg (173 lb 8 oz)   SpO2 99%   BMI 32.78 kg/m²   Physical Exam:  Constitutional: Appears chronically ill. Appears uncomfortable and with furrowed brow. Head: Normocephalic and atraumatic. Eyes: EOM are normal. No ocular discharge. No scleral icterus. Cardiac: Normal rate  Respiratory: Effort normal. No stridor. No respiratory distress. No cough, trach in place  Gastrointestinal: No abdominal distension. Musculoskeletal: No edema. Neurological: Awakens to voice  Skin: Dry, no diaphoresis. Psychiatric: Tearful    Lab Results:   I have personally reviewed pertinent labs. , CBC:   Lab Results   Component Value Date    WBC 11.85 (H) 09/18/2023    HGB 13.6 09/18/2023    HCT 41.1 09/18/2023    MCV 97 09/18/2023     09/18/2023    RBC 4.25 09/18/2023    MCH 32.0 09/18/2023    MCHC 33.1 09/18/2023    RDW 17.0 (H) 09/18/2023    MPV 10.1 09/18/2023    NRBC 0 09/18/2023   , CMP:   Lab Results   Component Value Date    SODIUM 143 09/18/2023    K 3.9 09/18/2023     (H) 09/18/2023    CO2 26 09/18/2023    BUN 24 09/18/2023    CREATININE 0.98 09/18/2023    CALCIUM 8.4 09/18/2023    EGFR 59 09/18/2023     Imaging Studies: I have personally reviewed pertinent reports. EKG, Pathology, and Other Studies: I have personally reviewed pertinent reports. Counseling / Coordination of Care  Total floor / unit time spent today 70 minutes. Greater than 50% of total time was spent with the patient and / or family counseling and / or coordination of care.  A description of the counseling / coordination of care: Reviewed chart, provided medical updates, determined goals of care, discussed palliative care and symptom management, discussed code status, determined competency and POA/HCA, determined social/family support, provided psychosocial support. Reviewed with ICU team and RN. This note was not shared with the patient due to privacy exception: note includes other individuals    Portions of this document may have been created using dictation software and as such some "sound alike" terms may have been generated by the system. Do not hesitate to contact me with any questions or clarifications.

## 2023-09-18 NOTE — ASSESSMENT & PLAN NOTE
Lab Results   Component Value Date    CREATININE 0.98 09/18/2023    CREATININE 0.83 09/17/2023    CREATININE 0.89 09/16/2023       Likely prerenal.  Second to poor oral intake versus poor urine output. Baseline creatinine 1 to 1.2. Plan:  • Currently resolved  • Urinary retention protocol, Daily weights, I/O  • Monitor BMP daily and observe for downward trend of creatinine  • Avoid hypoperfusion of the kidneys, minimize nephrotoxins  • RAAS Blockers/Diuretics held: Lisinopril.

## 2023-09-18 NOTE — ASSESSMENT & PLAN NOTE
Lab Results   Component Value Date    CHOLESTEROL 203 (H) 01/24/2023    CHOLESTEROL 177 08/11/2022    CHOLESTEROL 184 07/08/2020     Lab Results   Component Value Date    HDL 45 (L) 01/24/2023    HDL 37 (L) 08/11/2022    HDL 44 (L) 07/08/2020     Lab Results   Component Value Date    TRIG 166 (H) 09/16/2023    TRIG 160 (H) 01/24/2023    TRIG 108 08/11/2022     Lab Results   Component Value Date    NONHDLC 146 07/12/2018    [facility] 207 (H) 04/29/2013     Continue outpatient diet/ lifestyle modification.

## 2023-09-19 ENCOUNTER — TELEPHONE (OUTPATIENT)
Dept: HEMATOLOGY ONCOLOGY | Facility: CLINIC | Age: 68
End: 2023-09-19

## 2023-09-19 ENCOUNTER — PATIENT OUTREACH (OUTPATIENT)
Dept: HEMATOLOGY ONCOLOGY | Facility: CLINIC | Age: 68
End: 2023-09-19

## 2023-09-19 ENCOUNTER — APPOINTMENT (INPATIENT)
Dept: RADIOLOGY | Facility: HOSPITAL | Age: 68
DRG: 004 | End: 2023-09-19
Payer: COMMERCIAL

## 2023-09-19 DIAGNOSIS — F30.9 BIPOLAR I DISORDER, SINGLE MANIC EPISODE (HCC): ICD-10-CM

## 2023-09-19 LAB
ANION GAP SERPL CALCULATED.3IONS-SCNC: 5 MMOL/L
BACTERIA TISS AEROBE CULT: ABNORMAL
BACTERIA WND AEROBE CULT: ABNORMAL
BASE EXCESS BLDA CALC-SCNC: 2 MMOL/L (ref -2–3)
BUN SERPL-MCNC: 27 MG/DL (ref 5–25)
CA-I BLD-SCNC: 1.19 MMOL/L (ref 1.12–1.32)
CALCIUM SERPL-MCNC: 8.4 MG/DL (ref 8.4–10.2)
CHLORIDE SERPL-SCNC: 109 MMOL/L (ref 96–108)
CO2 SERPL-SCNC: 27 MMOL/L (ref 21–32)
CREAT SERPL-MCNC: 0.93 MG/DL (ref 0.6–1.3)
ERYTHROCYTE [DISTWIDTH] IN BLOOD BY AUTOMATED COUNT: 16.9 % (ref 11.6–15.1)
GFR SERPL CREATININE-BSD FRML MDRD: 62 ML/MIN/1.73SQ M
GLUCOSE SERPL-MCNC: 131 MG/DL (ref 65–140)
GLUCOSE SERPL-MCNC: 135 MG/DL (ref 65–140)
GRAM STN SPEC: ABNORMAL
HCO3 BLDA-SCNC: 28 MMOL/L (ref 24–30)
HCT VFR BLD AUTO: 37.4 % (ref 34.8–46.1)
HCT VFR BLD CALC: 34 % (ref 34.8–46.1)
HGB BLD-MCNC: 12.2 G/DL (ref 11.5–15.4)
HGB BLDA-MCNC: 11.6 G/DL (ref 11.5–15.4)
MAGNESIUM SERPL-MCNC: 2.2 MG/DL (ref 1.9–2.7)
MCH RBC QN AUTO: 31.9 PG (ref 26.8–34.3)
MCHC RBC AUTO-ENTMCNC: 32.6 G/DL (ref 31.4–37.4)
MCV RBC AUTO: 98 FL (ref 82–98)
MYCOBACTERIUM SPEC CULT: NORMAL
PCO2 BLD: 29 MMOL/L (ref 21–32)
PCO2 BLD: 47.3 MM HG (ref 42–50)
PH BLD: 7.38 [PH] (ref 7.3–7.4)
PHOSPHATE SERPL-MCNC: 2.4 MG/DL (ref 2.3–4.1)
PLATELET # BLD AUTO: 152 THOUSANDS/UL (ref 149–390)
PMV BLD AUTO: 10.5 FL (ref 8.9–12.7)
PO2 BLD: 75 MM HG (ref 35–45)
POTASSIUM BLD-SCNC: 3.5 MMOL/L (ref 3.5–5.3)
POTASSIUM SERPL-SCNC: 3.6 MMOL/L (ref 3.5–5.3)
RBC # BLD AUTO: 3.83 MILLION/UL (ref 3.81–5.12)
RHODAMINE-AURAMINE STN SPEC: NORMAL
SAO2 % BLD FROM PO2: 94 % (ref 60–85)
SODIUM BLD-SCNC: 142 MMOL/L (ref 136–145)
SODIUM SERPL-SCNC: 141 MMOL/L (ref 135–147)
SPECIMEN SOURCE: ABNORMAL
WBC # BLD AUTO: 12.59 THOUSAND/UL (ref 4.31–10.16)

## 2023-09-19 PROCEDURE — 84132 ASSAY OF SERUM POTASSIUM: CPT

## 2023-09-19 PROCEDURE — 84295 ASSAY OF SERUM SODIUM: CPT

## 2023-09-19 PROCEDURE — 84100 ASSAY OF PHOSPHORUS: CPT

## 2023-09-19 PROCEDURE — 85027 COMPLETE CBC AUTOMATED: CPT

## 2023-09-19 PROCEDURE — 99233 SBSQ HOSP IP/OBS HIGH 50: CPT | Performed by: INTERNAL MEDICINE

## 2023-09-19 PROCEDURE — 94003 VENT MGMT INPAT SUBQ DAY: CPT

## 2023-09-19 PROCEDURE — 99233 SBSQ HOSP IP/OBS HIGH 50: CPT | Performed by: OTOLARYNGOLOGY

## 2023-09-19 PROCEDURE — 82947 ASSAY GLUCOSE BLOOD QUANT: CPT

## 2023-09-19 PROCEDURE — 94640 AIRWAY INHALATION TREATMENT: CPT

## 2023-09-19 PROCEDURE — 83735 ASSAY OF MAGNESIUM: CPT

## 2023-09-19 PROCEDURE — 94760 N-INVAS EAR/PLS OXIMETRY 1: CPT

## 2023-09-19 PROCEDURE — 82803 BLOOD GASES ANY COMBINATION: CPT

## 2023-09-19 PROCEDURE — 85014 HEMATOCRIT: CPT

## 2023-09-19 PROCEDURE — 82330 ASSAY OF CALCIUM: CPT

## 2023-09-19 PROCEDURE — 80048 BASIC METABOLIC PNL TOTAL CA: CPT

## 2023-09-19 PROCEDURE — 94150 VITAL CAPACITY TEST: CPT

## 2023-09-19 PROCEDURE — 71045 X-RAY EXAM CHEST 1 VIEW: CPT

## 2023-09-19 RX ORDER — ACETAMINOPHEN 325 MG/1
975 TABLET ORAL ONCE
Status: COMPLETED | OUTPATIENT
Start: 2023-09-19 | End: 2023-09-19

## 2023-09-19 RX ORDER — ONDANSETRON 2 MG/ML
4 INJECTION INTRAMUSCULAR; INTRAVENOUS EVERY 6 HOURS PRN
Status: DISCONTINUED | OUTPATIENT
Start: 2023-09-19 | End: 2023-09-25

## 2023-09-19 RX ORDER — BUSPIRONE HYDROCHLORIDE 30 MG/1
30 TABLET ORAL 3 TIMES DAILY
Qty: 270 TABLET | Refills: 0 | Status: SHIPPED | OUTPATIENT
Start: 2023-09-19

## 2023-09-19 RX ORDER — SENNOSIDES 8.8 MG/5ML
8.8 LIQUID ORAL
Status: DISCONTINUED | OUTPATIENT
Start: 2023-09-19 | End: 2023-11-14

## 2023-09-19 RX ORDER — OXYCODONE HCL 5 MG/5 ML
5 SOLUTION, ORAL ORAL EVERY 4 HOURS PRN
Status: DISCONTINUED | OUTPATIENT
Start: 2023-09-19 | End: 2023-10-11

## 2023-09-19 RX ORDER — OXYCODONE HCL 5 MG/5 ML
2.5 SOLUTION, ORAL ORAL EVERY 4 HOURS PRN
Status: DISCONTINUED | OUTPATIENT
Start: 2023-09-19 | End: 2023-10-11

## 2023-09-19 RX ORDER — GUAIFENESIN 100 MG/5ML
200 SOLUTION ORAL 2 TIMES DAILY
Status: DISCONTINUED | OUTPATIENT
Start: 2023-09-19 | End: 2023-09-19

## 2023-09-19 RX ORDER — GUAIFENESIN 100 MG/5ML
200 SOLUTION ORAL EVERY 6 HOURS
Status: DISCONTINUED | OUTPATIENT
Start: 2023-09-19 | End: 2023-10-13

## 2023-09-19 RX ORDER — SENNOSIDES 8.6 MG
1 TABLET ORAL
Status: DISCONTINUED | OUTPATIENT
Start: 2023-09-19 | End: 2023-09-19

## 2023-09-19 RX ORDER — HYDRALAZINE HYDROCHLORIDE 25 MG/1
25 TABLET, FILM COATED ORAL EVERY 8 HOURS SCHEDULED
Status: CANCELLED | OUTPATIENT
Start: 2023-09-19

## 2023-09-19 RX ADMIN — GUAIFENESIN 200 MG: 200 SOLUTION ORAL at 20:28

## 2023-09-19 RX ADMIN — IPRATROPIUM BROMIDE 0.5 MG: 0.5 SOLUTION RESPIRATORY (INHALATION) at 01:37

## 2023-09-19 RX ADMIN — OXYCODONE HYDROCHLORIDE 5 MG: 5 TABLET ORAL at 22:27

## 2023-09-19 RX ADMIN — CARVEDILOL 25 MG: 12.5 TABLET, FILM COATED ORAL at 08:11

## 2023-09-19 RX ADMIN — OXYCODONE HYDROCHLORIDE 5 MG: 5 TABLET ORAL at 14:05

## 2023-09-19 RX ADMIN — GABAPENTIN 300 MG: 300 CAPSULE ORAL at 08:11

## 2023-09-19 RX ADMIN — ONDANSETRON 4 MG: 2 INJECTION INTRAMUSCULAR; INTRAVENOUS at 20:29

## 2023-09-19 RX ADMIN — IPRATROPIUM BROMIDE 0.5 MG: 0.5 SOLUTION RESPIRATORY (INHALATION) at 13:18

## 2023-09-19 RX ADMIN — BUSPIRONE HYDROCHLORIDE 30 MG: 10 TABLET ORAL at 20:32

## 2023-09-19 RX ADMIN — BUSPIRONE HYDROCHLORIDE 30 MG: 10 TABLET ORAL at 08:13

## 2023-09-19 RX ADMIN — CHLORHEXIDINE GLUCONATE 15 ML: 1.2 SOLUTION ORAL at 08:10

## 2023-09-19 RX ADMIN — NICOTINE 21 MG: 21 PATCH, EXTENDED RELEASE TRANSDERMAL at 08:11

## 2023-09-19 RX ADMIN — NYSTATIN 500000 UNITS: 100000 SUSPENSION ORAL at 11:24

## 2023-09-19 RX ADMIN — NYSTATIN 500000 UNITS: 100000 SUSPENSION ORAL at 08:10

## 2023-09-19 RX ADMIN — SERTRALINE HYDROCHLORIDE 50 MG: 50 TABLET ORAL at 08:10

## 2023-09-19 RX ADMIN — ONDANSETRON 4 MG: 2 INJECTION INTRAMUSCULAR; INTRAVENOUS at 15:05

## 2023-09-19 RX ADMIN — LEVALBUTEROL HYDROCHLORIDE 1.25 MG: 1.25 SOLUTION RESPIRATORY (INHALATION) at 19:32

## 2023-09-19 RX ADMIN — LEVALBUTEROL HYDROCHLORIDE 1.25 MG: 1.25 SOLUTION RESPIRATORY (INHALATION) at 01:37

## 2023-09-19 RX ADMIN — NYSTATIN 500000 UNITS: 100000 SUSPENSION ORAL at 22:27

## 2023-09-19 RX ADMIN — Medication 8.8 MG: at 22:27

## 2023-09-19 RX ADMIN — OXYCODONE HYDROCHLORIDE 5 MG: 5 TABLET ORAL at 05:57

## 2023-09-19 RX ADMIN — LEVALBUTEROL HYDROCHLORIDE 1.25 MG: 1.25 SOLUTION RESPIRATORY (INHALATION) at 13:18

## 2023-09-19 RX ADMIN — AMLODIPINE BESYLATE 10 MG: 5 TABLET ORAL at 08:10

## 2023-09-19 RX ADMIN — GUAIFENESIN 200 MG: 200 SOLUTION ORAL at 14:04

## 2023-09-19 RX ADMIN — OXYCODONE HYDROCHLORIDE 5 MG: 5 SOLUTION ORAL at 17:44

## 2023-09-19 RX ADMIN — ENOXAPARIN SODIUM 40 MG: 40 INJECTION SUBCUTANEOUS at 08:10

## 2023-09-19 RX ADMIN — DEXMEDETOMIDINE HYDROCHLORIDE 0.5 MCG/KG/HR: 4 INJECTION, SOLUTION INTRAVENOUS at 02:44

## 2023-09-19 RX ADMIN — FENTANYL CITRATE 50 MCG: 50 INJECTION INTRAMUSCULAR; INTRAVENOUS at 00:20

## 2023-09-19 RX ADMIN — GABAPENTIN 300 MG: 300 CAPSULE ORAL at 20:28

## 2023-09-19 RX ADMIN — LEVALBUTEROL HYDROCHLORIDE 1.25 MG: 1.25 SOLUTION RESPIRATORY (INHALATION) at 07:26

## 2023-09-19 RX ADMIN — ACETAMINOPHEN 975 MG: 325 TABLET, FILM COATED ORAL at 05:57

## 2023-09-19 RX ADMIN — CHLORHEXIDINE GLUCONATE 15 ML: 1.2 SOLUTION ORAL at 21:00

## 2023-09-19 RX ADMIN — IPRATROPIUM BROMIDE 0.5 MG: 0.5 SOLUTION RESPIRATORY (INHALATION) at 07:26

## 2023-09-19 RX ADMIN — GUAIFENESIN 200 MG: 200 SOLUTION ORAL at 08:10

## 2023-09-19 RX ADMIN — IPRATROPIUM BROMIDE 0.5 MG: 0.5 SOLUTION RESPIRATORY (INHALATION) at 19:33

## 2023-09-19 RX ADMIN — FAMOTIDINE 20 MG: 10 INJECTION INTRAVENOUS at 08:11

## 2023-09-19 NOTE — PROGRESS NOTES
OTOLARYNGOLOGY PROGRESS NOTE    Date of Service: 9/19/2023 6:30 AM    HPI  Patient is a 69 y.o. female w/ recent COVID/pneumonia requiring admission for infectious control earlier this yr, recently dc, [facility] on 9[facility]13 due to SOB and hypoxia, w/ limited oral cavity space observed. Pt agreed to intubation for airway management on 9/14. CT imaging demonstrated L naso/oropharyngeal mass w/ some level 3/4 neck LAD, new R middle lobe patchy consolidation. Daughter at bedside answered questions, including pt is an active tobacco user for many yr, and has complained of worsening difficulty w/ breathing, "gum swelling," and discomfort at back of mouth for past 1.5-2wk. Pt's cause of acute difficult airway was attributed to angioedema due to lisinopril, w/ previous "angioedema" episode in 2020. Ddx being cancer vs infectious etiologies. Overnight Events: POD 3 s/p trach    WBC: 12.59 (11.85) on zosyn & vanc    PHYSICAL EXAM:  Vitals:    09/19/23 0415   BP: 134/63   Pulse: 80   Resp: (!) 26   Temp: (!) 100.6 °F (38.1 °C)   SpO2: 97%        General: Intubated  HEENT: No neck LAD palpated, 7 cuffed proximal XLT tracheostomy in place  Neurology: Cannot assess neurologic fx  Lungs:  On ventilator  Cardio: RRR, well perfused  Abd: soft, NT, ND       Intake/Output Summary (Last 24 hours) at 9/19/2023 0630  Last data filed at 9/19/2023 0601  Gross per 24 hour   Intake 871.85 ml   Output 2205 ml   Net -1333.15 ml         LABORATORY    Recent Labs     09/17/23  0436 09/18/23  0559 09/19/23  0617   WBC 10.91* 11.85* 12.59*   HGB 12.7 13.6 12.2   HCT 36.8 41.1 37.4    162 152       Recent Labs     09/17/23  0436 09/18/23  0559   K 4.2 3.9    111*   BUN 21 24   PHOS 2.2* 2.4   MG 2.2 2.3       Invalid input(s): "PTPAT", "PTINR", "APTTMNNM", "APTTPAT"      Patient Active Problem List   Diagnosis   • Benign essential hypertension   • Bipolar I disorder, single manic episode ([address])   • Disc degeneration, lumbar   • 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   • Hyperlipidemia   • Lumbar radiculopathy   • Myofascial pain syndrome   • Pain syndrome, chronic   • Osteoarthritis of spine with radiculopathy, lumbar region   • Angio-edema, initial encounter   • Abnormal computed tomography angiography (CTA)   • Bipolar disorder, unspecified ([facility])   • Nicotine dependence   • Bone lesion   • CKD (chronic kidney disease) stage 3, GFR 30-59 ml/min ([facility])   • Acute respiratory failure with hypoxia secondary to oropharyngeal mass   • (HFpEF) heart failure with preserved ejection fraction ([facility])   • Pulmonary embolism ([facility])   • Vaginal itching   • Ambulatory dysfunction   • Angioedema   • RML pneumonia   • PO (acute kidney injury) ([facility])   • Oropharyngeal mass   • Blister (nonthermal) of left forearm, sequela   • Encephalopathy   • COPD without exacerbation ([facility])         ASSESSMENT  Patient is a 69 y.o. female w/ acute and chronic problems as above, w/ naso/oropharyngeal mass observed on CT, likely inducing diminished airway space, requiring intubation for airway protection, now POD 3 s/p trach    PLAN  - fu biopsy  - wean vent per ICU  - will need family discussion regarding plan following biopsy results  - rest of care per primary  - ENT continues following

## 2023-09-19 NOTE — ASSESSMENT & PLAN NOTE
· Well controlled at home. · Home meds: Amlodipine 10 mg daily, Coreg 25 mg twice daily, lisinopril 10 mg daily. Plan:  · Currently on amlodipine 10 mg daily and Coreg 25 mg BID for BP controll. · PRN hydralazine if BP >160. · Avoid ACEI/ ARB even currently less likely cause of angioedema.

## 2023-09-19 NOTE — ASSESSMENT & PLAN NOTE
Lab Results   Component Value Date    EGFR 80 09/20/2023    EGFR 62 09/19/2023    EGFR 59 09/18/2023    CREATININE 0.76 09/20/2023    CREATININE 0.93 09/19/2023    CREATININE 0.98 09/18/2023     Stable.

## 2023-09-19 NOTE — ASSESSMENT & PLAN NOTE
· 9/14 Neck/ soft tissue CT: Large enhancing soft tissue mass identified within the left neck involving multiple spaces. This appears to be centered within the left oropharynx with extensions as described above into multiple spaces. This results in significant airway compromise. 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 Small level 3 and level 4 nodes are seen within the neck. · Tracheostomy by ENT, bx & addition testing performed  · Bx: Positive for malignancy, favor poorly differentiated carcinoma. Cannot entirely exclude a high grade large cell lymphoma. Portion of specimen submitted for flow cytometry. · H/o tobacco abuse, daily smoker. · Daughter was updated at bedside.     Plan:  · ENT/ Med onc following, appreciated  · Follow final biopsy report  · Continue on vent per Trach at this moment  · Off vent if able

## 2023-09-19 NOTE — ASSESSMENT & PLAN NOTE
· 9/13 CT PE study: Patchy consolidation right middle lobe, new from 8/25/2023, compatible with pneumonia. · No leukocytosis, no fever/chills. · Positive for sore throat, cough. · New onset pneumonia after recent hospitalization and antibiotics treatment. · 9/14: Bronchoscopy/ ABL. · MRSA negative. · ABL revealed 2+ Growth of Candida albicans, suspected contaminant. Plan:  · Zosyn x5 days completed. · Blood cultures neg. · Follow fever curve.

## 2023-09-19 NOTE — PROGRESS NOTES
Intake received. Chart reviewed. Patient is currently inpatient. Patient w/ recent COVID/pneumonia requiring admission for infectious control earlier this yr, recently dc, [facility] on [facility]/13 due to SOB and hypoxia, w/ limited oral cavity space observed. Pt agreed to intubation for airway management on 9/14. CT imaging demonstrated a large enhancing soft tissue mass identified within the left neck involving multiple spaces. This appears to be centered within the left oropharynx with extensions into multiple spaces resulting in significant airway compromise. 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 Small level 3 and level 4 nodes are seen within the neck. She underwent tracheostomy and biopsy of nasopharynx mass with pathology positive for malignancy, favor poorly differentiated carcinoma. Cannot entirely exclude a high grade large cell lymphoma. Radiation possibly planned for after MRI. She is scheduled for hospital follow-up with Dr. Otilia Sexton on 10/3/23. Will outreach upon discharge.

## 2023-09-19 NOTE — ASSESSMENT & PLAN NOTE
Wt Readings from Last 3 Encounters:   09/20/23 81.5 kg (179 lb 10.8 oz)   09/07/23 74.6 kg (164 lb 6.4 oz)   08/30/23 73.3 kg (161 lb 9.6 oz)     Lab Results   Component Value Date    LVEF 60 08/28/2023     (H) 09/13/2023     (H) 08/25/2023     • Volume status: Euvolemic. • POA physical (limited): JVD-, HJR-, B/L LE trace to 1+ edema. • Echo 8/28/23: LVEF 60%.  D1DD.     Plan:  • CMP, magnesium; Goal Mg > 2 and K > 4; Replete prn  • HOB > 30°, Daily standing weights, Measure I/O

## 2023-09-19 NOTE — ASSESSMENT & PLAN NOTE
Lab Results   Component Value Date    CHOLESTEROL 203 (H) 01/24/2023    CHOLESTEROL 177 08/11/2022    CHOLESTEROL 184 07/08/2020     Lab Results   Component Value Date    HDL 45 (L) 01/24/2023    HDL 37 (L) 08/11/2022    HDL 44 (L) 07/08/2020     Lab Results   Component Value Date    TRIG 166 (H) 09/16/2023    TRIG 160 (H) 01/24/2023    TRIG 108 08/11/2022     Lab Results   Component Value Date    NONHDLC 146 07/12/2018    [address] [facility] 207 (H) 04/29/2013     Continue outpatient diet/ lifestyle modification.

## 2023-09-19 NOTE — ASSESSMENT & PLAN NOTE
• POA with acute respiratory failure, SOB. • On physical in [place] ([facility]) ED: Swollen tongue, swelling soft and hard palate and almost the head of all phalanx is completely closed. Severe angioedema. • Decadron 10 mg, Benadryl 25 mg given. • Initially refused but eventually agreed with intubation. • Prior episode of angioedema in 2020 noted. Suspected coadministration of increase the dose of tramadol and lisinopril. • Per daughter, there is no recent change in medications except Trelegy inhaler, cefdinir. 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       Plan:  • Trach placed 9/17

## 2023-09-19 NOTE — PLAN OF CARE
Problem: MOBILITY - ADULT  Goal: Maintain or return to baseline ADL function  Description: INTERVENTIONS:  -  Assess patient's ability to carry out ADLs; assess patient's baseline for ADL function and identify physical deficits which impact ability to perform ADLs (bathing, care of mouth/teeth, toileting, grooming, dressing, etc.)  - Assess/evaluate cause of self-care deficits   - Assess range of motion  - Assess patient's mobility; develop plan if impaired  - Assess patient's need for assistive devices and provide as appropriate  - Encourage maximum independence but intervene and supervise when necessary  - Involve family in performance of ADLs  - Assess for home care needs following discharge   - Consider OT consult to assist with ADL evaluation and planning for discharge  - Provide patient education as appropriate  Outcome: Progressing  Goal: Maintains/Returns to pre admission functional level  Description: INTERVENTIONS:  - Perform BMAT or MOVE assessment daily.   - Set and communicate daily mobility goal to care team and patient/family/caregiver.    - Collaborate with rehabilitation services on mobility goals if consulted  Problem: Prexisting or High Potential for Compromised Skin Integrity  Goal: Skin integrity is maintained or improved  Description: INTERVENTIONS:  - Identify patients at risk for skin breakdown  - Assess and monitor skin integrity  - Assess and monitor nutrition and hydration status  - Monitor labs   - Assess for incontinence   - Turn and reposition patient  - Assist with mobility/ambulation  - Relieve pressure over bony prominences  - Avoid friction and shearing  - Provide appropriate hygiene as needed including keeping skin clean and dry  - Evaluate need for skin moisturizer/barrier cream  - Collaborate with interdisciplinary team   - Patient/family teaching  - Consider wound care consult   Outcome: Progressing     Problem: SAFETY,RESTRAINT: NV/NON-SELF DESTRUCTIVE BEHAVIOR  Goal: Remains free of harm/injury (restraint for non violent/non self-detsructive behavior)  Description: INTERVENTIONS:  - Instruct patient/family regarding restraint use   - Assess and monitor physiologic and psychological status   - Provide interventions and comfort measures to meet assessed patient needs   - Identify and implement measures to help patient regain control  - Assess readiness for release of restraint   Outcome: Progressing  Goal: Returns to optimal restraint-free functioning  Description: INTERVENTIONS:  - Assess the patient's behavior and symptoms that indicate continued need for restraint  - Identify and implement measures to help patient regain control  - Assess readiness for release of restraint   Outcome: Progressing     Problem: Nutrition/Hydration-ADULT  Goal: Nutrient/Hydration intake appropriate for improving, restoring or maintaining nutritional needs  Description: Monitor and assess patient's nutrition/hydration status for malnutrition. Collaborate with interdisciplinary team and initiate plan and interventions as ordered. Monitor patient's weight and dietary intake as ordered or per policy. Utilize nutrition screening tool and intervene as necessary. Determine patient's food preferences and provide high-protein, high-caloric foods as appropriate.      INTERVENTIONS:  - Monitor oral intake, urinary output, labs, and treatment plans  - Assess nutrition and hydration status and recommend course of action  - Evaluate amount of meals eaten  - Assist patient with eating if necessary   - Allow adequate time for meals  - Recommend/ encourage appropriate diets, oral nutritional supplements, and vitamin/mineral supplements  - Order, calculate, and assess calorie counts as needed  - Recommend, monitor, and adjust tube feedings and TPN/PPN based on assessed needs  - Assess need for intravenous fluids  - Provide specific nutrition/hydration education as appropriate  - Include patient/family/caregiver in decisions related to nutrition  Outcome: Progressing     - Record patient progress and toleration of activity level   Outcome: Progressing

## 2023-09-19 NOTE — ASSESSMENT & PLAN NOTE
Lab Results   Component Value Date    CREATININE 0.76 09/20/2023    CREATININE 0.93 09/19/2023    CREATININE 0.98 09/18/2023       Likely prerenal.  Second to poor oral intake versus poor urine output. Baseline creatinine 1 to 1.2. Plan:  • Currently resolved  • Urinary retention protocol, Daily weights, I/O  • Monitor BMP daily and observe for downward trend of creatinine  • Avoid hypoperfusion of the kidneys, minimize nephrotoxins  • RAAS Blockers/Diuretics held: Lisinopril.

## 2023-09-19 NOTE — CONSULTS
Medical Oncology/Hematology Consult Note  Daya Harrison, female, 69 y.o., 1955,  ICU 06/ICU 06, [id number]     Reason for consultation: "Biopsy of neck mass with poorly differentiated carcinoma"      History of present illness:  Daya Harrison is a 69 y.o. female PMH remarkable of Nicotine dependence, COPD/emphysema, HTN, recent PNA (post-COVID), HFpEF.      Presented to ED 9/13/23 with respiratory distress, SpO2 83%, tongue swelling/angioedema was suspected on admission, found to have large nasopharyngeal mass, with airway compression, s/p intubation, then tracheostomy per ENT 09/17     Assessment and Plan  1. Left Neck Mass, oropharyngeal with extension into the nasopharynx      09/13 CTA Chest Rt middle lobe consolidation (PNA), slight regression of previous hilar and mediastinal LNs (had recent PNA in August 2023)      09/14 CT Soft Tissue Neck w contrast: soft tissue mass ~ 6.1 x 4.7 x 5.2 cm appears to be centered within Lt oropharynx involving multiple spaces and extends into the nasopharynx. .. multiple small jugular chain nodes on the left.      09/17 nasopharynx mass biopsy initial results positive for malignancy favor poorly differentiated carcinoma. Cannot exclude lymphoma.  Flow cytometry pending.      Plan and recommendations:      Differentials include squamous cell Oropharyngeal carcinoma (more likely) vs other H&N malignancies or lymphoma; no final diagnosis, pending pathology/cytometry.       - follow up final pathology results, flow cytometry and P16 (HPV) status.      - if confirmed Oropharyngeal cancer, clinically at least E2C8iO8, concurrent Chemo/Radx with the preferred regimen per NCCN guidelines v.2.2023 as following:         - pending path results, consider Rad Oncology consultation - discussed with Dr. Richard Colvin and [address] [place] [address] team; recs for MRI brain & facial/neck , given local invasion and r/o brain mets; MRI ordered , will follow results      - ENT on board, input appreciated, s/p Tracheostomy 9/17    ___________________________________     Outpatient follow up plan: f/u scheduled with Dr. Jelly Bonner on 10/03/2023      Communication with patient/family:  I did update the patient, as well as her daughter at bedside     Communication with team:  Did communicate with Rad Oncology (Dr. Carina Morris) and CC Dr. Jefferson Hameed , primary team.     I did review this patient with my attending Dr. Yesi Love and they are in agreement with this plan. Please see attestation above when available for more details.      _________________________________________     Subjective ; Patient sedated at ICU , [place] in place   Met the daughter at bedside this afternoon   Discussed above A&P with her , all questions answered     Patient lives with the daughter ; has a son as well   Up to last month admission with PNA patient was overall independent ECOG 0-1 ; just bought a new car to go out for shopping / others by herself while daughter at  Work     Per daughter ; the patient noted some sore discomfort last month but was around same time of her COVID/PNA ,so the oropharyngeal mass was not discovered till this ED visit and ENT eval .     She lean toward treatment , including palliative systemic therapy/radiation if pathology to confirm malignancy; she was also made aware of other options to consider including comfort care, and she is to discuss further with her brother, however she noted that the brother deferred the decision making to her, the daughter .      I explained and confirmed that no final diagnosis yet pending pathology, she understand. All questions answered appropriately, discussed with critical care and radiation oncology team as above, medical oncology will continue to follow and update.         Review of Systems:   Review of Systems   Unable to perform as patient is sedated at [facility]      Oncology History:   Cancer Staging   No matching staging information was found for the patient. Oncology History    No history exists.        Past Medical History:   Past Medical History:   Diagnosis Date   • Anxiety    • Depression    • Fatty liver    • Hyperlipidemia    • Hypertension    • Psychiatric disorder    • Varicella        Past Surgical History:   Procedure Laterality Date   • BREAST SURGERY Bilateral     Reduction Procedure   • CARDIAC SURGERY     •  SECTION      x4   • DILATION AND CURETTAGE OF UTERUS     • ECTOPIC PREGNANCY SURGERY     •  [address] INCL FLUOR GDNCE DX W/CELL WASHG SPX N/A 2023    Procedure: BRONCHOSCOPY FLEXIBLE;  Surgeon: Natali Chaudhry MD;  Location: [facility];  Service: ENT   • TRACHEOSTOMY N/A 2023    Procedure: TRACHEOSTOMY, biopsy of nasopharynx mass;  Surgeon: Natali Chaudhry MD;  Location[facility];  Service: ENT       Family History   Problem Relation Age of Onset   • Lung cancer Mother    • Cirrhosis Father    • Hypertension Sister    • Bipolar disorder Sister    • Heart disease Sister    • Diabetes Family    • Heart disease Family    • Hypertension Family    • Stroke Family    • Thyroid disease Family        Social History     Socioeconomic History   • Marital status:      Spouse name: Not on file   • Number of children: Not on file   • Years of education: Not on file   • Highest education level: Not on file   Occupational History   • Occupation: retired   Tobacco Use   • Smoking status: Every Day     Packs/day: 1.00     Types: Cigarettes   • Smokeless tobacco: Never   • Tobacco comments:     2 Black and milds per day   Vaping Use   • Vaping Use: Never used   Substance and Sexual Activity   • Alcohol use: Not Currently   • Drug use: Yes     Types: Marijuana     Comment: for pain   • Sexual activity: Not Currently     Partners: Male     Birth control/protection: None   Other Topics Concern   • Not on file   Social History Narrative    Educational level-special ed/completed Master's Degree     Social Determinants of Health     Financial Resource Strain: Low Risk  (2023)    Overall Financial Resource Strain (CARDIA)    • Difficulty of Paying Living Expenses: Not hard at all   Food Insecurity: Not on file   Transportation Needs: No Transportation Needs (9/18/2023)    PRAPARE - Transportation    • Lack of Transportation (Medical): No    • Lack of Transportation (Non-Medical):  No   Physical Activity: Not on file   Stress: Not on file   Social Connections: Not on file   Intimate Partner Violence: Not on file   Housing Stability: Not on file         Current Facility-Administered Medications:   •  amLODIPine (NORVASC) tablet 10 mg, 10 mg, Oral, Daily, Seldon Fruit, DO, 10 mg at 09/18/23 1434  •  busPIRone (BUSPAR) tablet 30 mg, 30 mg, Oral, TID, Redia Felty, MD, 30 mg at 09/18/23 1501  •  carvedilol (COREG) tablet 25 mg, 25 mg, Oral, BID With Meals, May Veroniqueu Montserrat Rodriguez MD, 25 mg at 09/18/23 1809  •  chlorhexidine (PERIDEX) 0.12 % oral rinse 15 mL, 15 mL, Mouth/Throat, Q12H [facility], Redia Felty, MD, 15 mL at 09/18/23 0902  •  dexmedeTOMIDine (Precedex) 400 mcg in sodium chloride 0.9% 100 mL, 0.1-1.2 mcg/kg/hr, Intravenous, Titrated, Sole Mcclain, Last Rate: 9.8 mL/hr at 09/18/23 1802, 0.5 mcg/kg/hr at 09/18/23 1802  •  enoxaparin (LOVENOX) subcutaneous injection 40 mg, 40 mg, Subcutaneous, Q24H [facility], Redia Felty, MD, 40 mg at 09/18/23 0902  •  Famotidine (PF) (PEPCID) injection 20 mg, 20 mg, Intravenous, Q24H [facility], Redia Felty, MD, 20 mg at 09/18/23 0902  •  fentanyl citrate (PF) 100 MCG/2ML 50 mcg, 50 mcg, Intravenous, Q2H PRN, Redia Felty, MD, 50 mcg at 09/18/23 5803  •  gabapentin (NEURONTIN) capsule 300 mg, 300 mg, Oral, TID, Redia Felty, MD, 300 mg at 09/18/23 1500  •  levalbuterol (XOPENEX) inhalation solution 1.25 mg, 1.25 mg, Nebulization, Q6H, 1.25 mg at 09/18/23 1320 **AND** ipratropium (ATROVENT) 0.02 % inhalation solution 0.5 mg, 0.5 mg, Nebulization, Q6H, Redia Felty, MD, 0.5 mg at 09/18/23 1320  •  lactated ringers infusion, 10 mL/hr, Intravenous, Continuous, Tawnya Mckeon CRNA  • levalbuterol (XOPENEX) inhalation solution 1.25 mg, 1.25 mg, Nebulization, Q8H PRN, Hayley Palacios MD, 1.25 mg at 09/13/23 1942  •  nicotine (NICODERM CQ) 21 mg/24 hr TD 24 hr patch 21 mg, 21 mg, Transdermal, Daily, Hayley Palacios MD, 21 mg at 09/18/23 0902  •  NOREPINEPHRINE 4 MG  ML NSS (CMPD ORDER) infusion **ADS Override Pull**, , , ,   •  nystatin (MYCOSTATIN) oral suspension 500,000 Units, 500,000 Units, Swish & Swallow, 4x Daily, Hayley Palacios MD, 500,000 Units at 09/18/23 0902  •  oxyCODONE (ROXICODONE) IR tablet 5 mg, 5 mg, Oral, Q8H [address], Hayley Palacios MD, 5 mg at 09/18/23 1500  •  [COMPLETED] polyethylene glycol (MIRALAX) packet 17 g, 17 g, Oral, Once, 17 g at 09/16/23 1053 **FOLLOWED BY** polyethylene glycol (MIRALAX) packet 17 g, 17 g, Oral, Daily PRN, Hayley Palacios MD, 17 g at 09/18/23 1500  •  senna oral syrup 8.8 mg, 8.8 mg, Oral, HS, Hayley Palacios MD, 8.8 mg at 09/17/23 2116  •  sertraline (ZOLOFT) tablet 50 mg, 50 mg, Oral, Daily, Hayley Palacios MD, 50 mg at 09/18/23 0902    Medications Prior to Admission   Medication   • acetaminophen (TYLENOL) 650 mg CR tablet   • albuterol (PROVENTIL HFA,VENTOLIN HFA) 90 mcg/act inhaler   • amLODIPine (NORVASC) 10 mg tablet   • ASPIRIN-ACETAMINOPHEN-CAFFEINE PO   • busPIRone (BUSPAR) 30 MG tablet   • Calcium Carb-Cholecalciferol (OSCAL-D) 500 mg-200 units per tablet   • carvedilol (COREG) 25 mg tablet   • diclofenac sodium (VOLTAREN) 1 %   • EPINEPHrine (EPIPEN) 0.3 mg/0.3 mL SOAJ   • famotidine (PEPCID) 20 mg tablet   • fluticasone (FLONASE) 50 mcg/act nasal spray   • fluticasone-umeclidinium-vilanterol (Trelegy Ellipta) 100-62.5-25 mcg/actuation inhaler   • gabapentin (NEURONTIN) 600 MG tablet   • loratadine (CLARITIN) 10 mg tablet   • miconazole (MONISTAT-7) 2 % vaginal cream   • naloxone (NARCAN) 4 mg/0.1 mL nasal spray   • nicotine (NICODERM CQ) 7 mg/24hr TD 24 hr patch   • NON FORMULARY   • oxyCODONE (OXY-IR) 5 MG capsule   • sertraline (ZOLOFT) 50 mg tablet       Allergies   Allergen Reactions   • Methyldopa Shortness Of Breath and Other (See Comments)     Aldomet   • Lisinopril Angioedema     Came to the emergency room with diffuse tongue swelling, unable to breathe. Intubated with glide scope, transferred to ICU at [facility], started on steroids, Pepcid, Benadryl. Only culprit so far is lisinopril         Physical Exam:     /62   Pulse 78   Temp (!) 102 °F (38.9 °C) (Probe)   Resp 21   Wt 78.7 kg (173 lb 8 oz)   SpO2 98%   BMI 32.78 kg/m²     Physical Exam  Vitals reviewed. Constitutional:       General: She is not in acute distress. Appearance: She is ill-appearing. She is not diaphoretic. Comments: Appears ill, sedated, in no acute distress   HENT:      Head: Normocephalic and atraumatic. Right Ear: External ear normal.      Left Ear: External ear normal.      Nose: Nose normal.      Mouth/Throat:      Mouth: Mucous membranes are moist.      Pharynx: Oropharynx is clear. Eyes:      Extraocular Movements: Extraocular movements intact. Pupils: Pupils are equal, round, and reactive to light. Neck:      Comments: Trach in place  Cardiovascular:      Rate and Rhythm: Normal rate and regular rhythm. Pulses: Normal pulses. Heart sounds: No murmur heard. No gallop. Pulmonary:      Effort: Pulmonary effort is normal. No respiratory distress. Breath sounds: No wheezing or rales. Abdominal:      General: Bowel sounds are normal. There is no distension. Palpations: Abdomen is soft. Tenderness: There is no abdominal tenderness. Musculoskeletal:      Right lower leg: No edema. Left lower leg: No edema. Skin:     General: Skin is warm. Capillary Refill: Capillary refill takes less than 2 seconds. Coloration: Skin is not jaundiced or pale. Findings: No lesion or rash. Neurological:   Mental Status: She is disoriented.      Recent Results (from the past 48 hour(s)) Phosphorus    Collection Time: 09/17/23  4:36 AM   Result Value Ref Range    Phosphorus 2.2 (L) 2.3 - 4.1 mg/dL   Magnesium    Collection Time: 09/17/23  4:36 AM   Result Value Ref Range    Magnesium 2.2 1.9 - 2.7 mg/dL   Basic metabolic panel    Collection Time: 09/17/23  4:36 AM   Result Value Ref Range    Sodium 132 (L) 135 - 147 mmol/L    Potassium 4.2 3.5 - 5.3 mmol/L    Chloride 105 96 - 108 mmol/L    CO2 15 (L) 21 - 32 mmol/L    ANION GAP 12 mmol/L    BUN 21 5 - 25 mg/dL    Creatinine 0.83 0.60 - 1.30 mg/dL    Glucose 86 65 - 140 mg/dL    Calcium 7.4 (L) 8.4 - 10.2 mg/dL    eGFR 72 ml/min/1.73sq m   CBC    Collection Time: 09/17/23  4:36 AM   Result Value Ref Range    WBC 10.91 (H) 4.31 - 10.16 Thousand/uL    RBC 3.79 (L) 3.81 - 5.12 Million/uL    Hemoglobin 12.7 11.5 - 15.4 g/dL    Hematocrit 36.8 34.8 - 46.1 %    MCV 97 82 - 98 fL    MCH 33.5 26.8 - 34.3 pg    MCHC 34.5 31.4 - 37.4 g/dL    RDW 16.2 (H) 11.6 - 15.1 %    Platelets 165 442 - 470 Thousands/uL    MPV 9.8 8.9 - 12.7 fL   Tissue Exam    Collection Time: 09/17/23  9:27 AM   Result Value Ref Range    Case Report       Surgical Pathology Report                         Case: [id number]                                   Authorizing Provider:  Elisa Whatley MD          Collected:           09/17/2023 0927              Ordering Location:     [facility]        Received:            09/17/2023 [facility] [facility] Intensive Care                                                                             Unit                                                                         Pathologist:           Natalie Miller MD                                                         Intraop:               Natalie Miller MD                                                         Specimen:    Lymph Node - Lymphoma Prtocol, nasopharynx mass                                            Synoptic Checklist      Intraoperative Consultation       AF1:  Positive for malignancy, favor poorly differentiated carcinoma. Cannot entirely exclude a high grade large cell lymphoma. Portion of specimen submitted for flow cytometry. Reviewed by Isabel Mon M.D.  - Interpretation performed at [facility], [address] [place], [place] [id number]          Wound culture and Gram stain    Collection Time: 09/17/23  9:42 AM    Specimen: Bronchus; Sputum   Result Value Ref Range    Wound Culture 1+ Growth of Candida albicans (A)     Gram Stain Result 1+ Polys (A)     Gram Stain Result 1+ Budding Yeast with Pseudomycelia (A)    Culture, tissue and Gram stain    Collection Time: 09/17/23 10:01 AM    Specimen: Bronchus;  Tissue   Result Value Ref Range    Tissue Culture 1+ Growth of Yeast (A)     Gram Stain Result 4+ Polys (A)     Gram Stain Result 1+ Budding Yeast with Pseudomycelia (A)    Basic metabolic panel    Collection Time: 09/18/23  5:59 AM   Result Value Ref Range    Sodium 143 135 - 147 mmol/L    Potassium 3.9 3.5 - 5.3 mmol/L    Chloride 111 (H) 96 - 108 mmol/L    CO2 26 21 - 32 mmol/L    ANION GAP 6 mmol/L    BUN 24 5 - 25 mg/dL    Creatinine 0.98 0.60 - 1.30 mg/dL    Glucose 112 65 - 140 mg/dL    Calcium 8.4 8.4 - 10.2 mg/dL    eGFR 59 ml/min/1.73sq m   CBC and differential    Collection Time: 09/18/23  5:59 AM   Result Value Ref Range    WBC 11.85 (H) 4.31 - 10.16 Thousand/uL    RBC 4.25 3.81 - 5.12 Million/uL    Hemoglobin 13.6 11.5 - 15.4 g/dL    Hematocrit 41.1 34.8 - 46.1 %    MCV 97 82 - 98 fL    MCH 32.0 26.8 - 34.3 pg    MCHC 33.1 31.4 - 37.4 g/dL    RDW 17.0 (H) 11.6 - 15.1 %    MPV 10.1 8.9 - 12.7 fL    Platelets 894 029 - 277 Thousands/uL    nRBC 0 /100 WBCs    Neutrophils Relative 80 (H) 43 - 75 %    Immat GRANS % 1 0 - 2 %    Lymphocytes Relative 11 (L) 14 - 44 %    Monocytes Relative 8 4 - 12 %    Eosinophils Relative 0 0 - 6 %    Basophils Relative 0 0 - 1 %    Neutrophils Absolute 9.58 (H) 1.85 - 7.62 Thousands/µL Immature Grans Absolute 0.07 0.00 - 0.20 Thousand/uL    Lymphocytes Absolute 1.24 0.60 - 4.47 Thousands/µL    Monocytes Absolute 0.90 0.17 - 1.22 Thousand/µL    Eosinophils Absolute 0.05 0.00 - 0.61 Thousand/µL    Basophils Absolute 0.01 0.00 - 0.10 Thousands/µL   Magnesium    Collection Time: 09/18/23  5:59 AM   Result Value Ref Range    Magnesium 2.3 1.9 - 2.7 mg/dL   Phosphorus    Collection Time: 09/18/23  5:59 AM   Result Value Ref Range    Phosphorus 2.4 2.3 - 4.1 mg/dL       XR chest portable    Result Date: 9/18/2023  Narrative: CHEST INDICATION:   Assess. COMPARISON:  None EXAM PERFORMED/VIEWS:  XR CHEST PORTABLE Images: 2 FINDINGS: Tracheostomy tube is seen in appropriate position. A feeding tube is seen extending down below the dome of the diaphragm Cardiomegaly seen Increased lung markings seen suggest congestion left base is obscured Osseous structures appear within normal limits for patient age. Impression: Increased lung markings seen suggest congestion. The left base is obscured No worsening Workstation performed: [id number]     CT soft tissue neck w contrast    Result Date: 9/14/2023  Narrative: CT NECK WITH CONTRAST INDICATION:   Laryngeal edema COMPARISON:  None. TECHNIQUE:  Axial, sagittal, and coronal 2D reformatted images were created from the axial source data and submitted for interpretation. Radiation dose length product (DLP) for this visit:  769 mGy-cm . This examination, like all CT scans performed in the [facility], was performed utilizing techniques to minimize radiation dose exposure, including the use of iterative reconstruction and automated exposure control. IV Contrast:  85 mL of iohexol (OMNIPAQUE) IMAGE QUALITY:  Diagnostic. FINDINGS: VISUALIZED BRAIN PARENCHYMA:  No acute intracranial pathology of the visualized brain parenchyma. VISUALIZED ORBITS: Normal visualized orbits. PARANASAL SINUSES: Normal visualized paranasal sinuses.  Nasopharynx, oropharynx AND HYPOPHARYNX: There is a large heterogeneously enhancing mass identified within the neck involving multiple spaces. The origin is difficult to ascertain given the large size. This mass measures approximately 6.1 x 4.7 x 5.2 cm and appears to be centered within the left oropharynx. The mass extends superiorly into the nasopharynx left more so than right and into the posterior aspect of the nasal cavity. The mass also extends across the midline within the oropharynx and inferiorly into the left lateral oropharynx but not below the laryngeal ventricle. The mass extends laterally into the infratemporal fossa and parapharyngeal fat and is inseparable from infratemporal musculature and deep lobe of the parotid gland. There is extension into the prevertebral space where the mass is inseparable from the anterior paraspinal muscle on the left and posteriorly and laterally into the carotid space where the mass is displacing and inseparable from jugular and carotid. The mass encases the cervical internal carotid artery just below the level of the skull base resulting in narrowing of the vessel and the mass compresses and occludes the jugular vein below the skull base. The vessel does reconstitute via collateral vessels as it  extends inferiorly within the neck. The mass extends superiorly into the skull base inseparable from the carotid canal and jugular foramen. There is severe airway compromise within the posterior oral cavity and superior oropharynx. ET tube and OG tube are present. THYROID GLAND:  Unremarkable. PAROTID AND SUBMANDIBULAR GLANDS: Normal parotid and submandibular glands other than the mass abutting the deep lobe of the left parotid gland. LYMPH NODES: Multiple small jugular chain nodes are seen on the left. VASCULAR STRUCTURES: As described above the mass partially encases the cervical internal carotid artery, narrowing the vessel and occludes the jugular vein from the skull base to the mid neck.  Below this the vessel is unremarkable due to collateral reconstitution. THORACIC INLET: Posterior left upper lobe consolidation may represent atelectasis or pneumonia. Mild patchy groundglass opacity within the upper lung fields. BONY STRUCTURES: At T1-2 there is a left paramedian bridging osteophyte resulting in mild to moderate left anterior lateral canal stenosis. Impression: Large enhancing soft tissue mass identified within the left neck involving multiple spaces. This appears to be centered within the left oropharynx with extensions as described above into multiple spaces. This results in significant airway compromise. 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 Small level 3 and level 4 nodes are seen within the neck. I personally discussed this study with ICU resident on 9/14/2023 4:44 PM. Workstation performed: [id number]     Bronchoscopy    Result Date: 9/14/2023  Narrative: Table formatting from the original result was not included. [address] [place], [place] [id number] [phone] DATE OF SERVICE: 9/14/23 PHYSICIAN(S): Attending: No Staff Documented Fellow: No Staff Documented INDICATION: Acute respiratory failure with hypoxia ([address]) POST-OP DIAGNOSIS: See the impression below. PREPROCEDURE: Standard airway preparation completed per respiratory therapy protocol. Informed consent was obtained. Images reviewed prior to the procedure. A Time Out was performed. No suspicion or identified risk for TB or other airborne infectious disease; bronchoscopy procedure being performed for diagnostic purposes. PROCEDURE: Bronchoscopy DETAILS OF PROCEDURE: Patient was taken to the procedure room where a time out was performed to confirm correct patient and correct procedure. The patient was already under sedation prior to the procedure.  The patient's blood pressure, ECG, heart rate, level of consciousness, respirations and oxygen were monitored throughout the procedure. The patient experienced no blood loss. The scope was introduced through the endotracheal tube. The procedure was moderately difficult due to patient intolerance and persistent coughing. In response to procedure difficulty, deeper sedation was employed. The patient tolerated the procedure well. There were no apparent adverse events. ANESTHESIA INFORMATION: ASA: ASA status not filed in the log. Anesthesia Type: Anesthesia type not filed in the log. FINDINGS: The lower trachea, main sujata, left main stem, KARTHIK, lingula, LLL, right main stem, RUL, bronchus intermedius, RML and RLL appeared normal. Left lower lung zone with no endobronchial lesions, left upper and lingula both appear normal Right upper, right middle and right lower lobes all appeared normal with no endobronchial lesions Endotracheal tube was retracted 3 cm under direct visualization with the bronchoscope Bronchoalveolar lavage was performed x1 in the right lateral segment (RB4) with 60 mL of saline instilled and a total return of 40 mL; the fluid appeared cloudy SPECIMENS: ID Type Source Tests Collected by Time Destination 1 :  Lavage Lung, Right Middle Lobe Bronchoalveolar Lavage PULMONARY CYTOLOGY Freddie Astorga MD 9/14/2023 12:55 PM  A :  Bronchial Lung, Right Middle Lobe Bronchoalveolar Lavage FUNGAL CULTURE, VIRUS CULTURE, BRONCHIAL CULTURE AND GRAM STAIN, LEGIONELLA CULTURE, PNEUMOCYSTIS SMEAR BY DFA ([facility]), AFB CULTURE WITH STAIN Freddie Asotrga MD 9/14/2023 12:57 PM       Impression: BAL of the right middle lobe Endotracheal tube was retracted under direct visualization Tongue still appears swollen, vocal cords visualized outside of the endotracheal tube with the bronchoscope, no significant edema or mass noted RECOMMENDATION:  Follow-up infectious work-up      XR chest 1 view portable    Result Date: 9/13/2023  Narrative: CHEST INDICATION:   post intubation.  COMPARISON: Same day chest radiograph and subsequent same day CT chest. Chest x-ray 8/27/2023. EXAM PERFORMED/VIEWS:  XR CHEST PORTABLE FINDINGS: Endotracheal tube terminates approximately 4 cm above the sujata. Heart shadow is enlarged but unchanged from prior exam. Bibasilar airspace opacities are again demonstrated. No appreciable pneumothorax. No evidence of acute osseous abnormality. Lucent lesion within T12 is better demonstrated on same-day CT. Impression: 1. Endotracheal tube terminates 4 cm above the sujata. 2. Redemonstration of bibasilar airspace opacities. Workstation performed: [id number]     XR chest 1 view portable    Result Date: 9/13/2023  Narrative: CHEST INDICATION:   post intubation, adjusting ET Tube. COMPARISON:  None EXAM PERFORMED/VIEWS:  XR CHEST PORTABLE FINDINGS: Endotracheal tube terminates approximately 3 cm above the sujata. Cardiomediastinal silhouette appears unremarkable. Bibasilar airspace opacities are again noted. No appreciable pneumothorax or pleural effusion. No evidence of acute osseous abnormality. Lucent lesion within T12 is better demonstrated on same-day CT. Impression: 1. Endotracheal tube terminates 3.3 cm above the sujata. 2. Bibasilar airspace opacities are again demonstrated. Workstation performed: [id number]     XR chest portable    Result Date: 9/13/2023  Narrative: CHEST INDICATION:   sob. COMPARISON: 8/27/2023. Subsequent CT chest performed the same day. EXAM PERFORMED/VIEWS:  XR CHEST PORTABLE FINDINGS: Heart shadow is enlarged but unchanged from prior exam. Hazy opacities are noted within the right lung base, possibly atelectasis versus pneumonia. Left basilar opacity is similar to prior radiograph. No appreciable pneumothorax. No evidence of acute osseous abnormality. Cystic lesion within the T12 vertebral body is better characterized on same-day CT. Impression: 1. Hazy bibasilar airspace opacities which may represent atelectasis versus pneumonia.  Left basilar opacity is similar to recent radiograph while right basilar opacity is new Workstation performed: [id number]     CTA ED chest PE Study    Result Date: 9/13/2023  Narrative: CTA - CHEST WITH IV CONTRAST - PULMONARY ANGIOGRAM INDICATION:   R/O PE. Reports shortness of breath since being discharged from the hospital 3 weeks ago for pneumonia. Fatigue. Productive cough with white sputum. Angioedema. COMPARISON: Chest radiograph 9/13/2023, chest CT 8/25/2023, thoracic spine CT 11/13/2013. TECHNIQUE: CT angiogram timed for optimal opacification of the pulmonary arteries. Axial, sagittal, and coronal 2D reformats created from source data. Coronal 3D MIP postprocessing on the acquisition scanner. Radiation dose length product (DLP):  472 mGy-cm . Radiation dose exposure minimized using iterative reconstruction and automated exposure control. IV Contrast:  85 mL of iohexol (OMNIPAQUE) FINDINGS: PULMONARY ARTERIES:  No pulmonary embolus. Moderate pulmonary artery enlargement. LUNGS: Mild patchy new consolidation in the medial segment right middle lobe. Improving opacity in the left upper lobe with mild residual atelectasis/scar. Redemonstration of mild dependent atelectasis in both lower lobes. Severe emphysema. AIRWAYS: No significant filling defects. ET tube 4 cm above the sujata. PLEURA:  Unremarkable. HEART/GREAT VESSELS: Moderate cardiomegaly. MEDIASTINUM AND PRASANTH: Slight regression of hilar and mediastinal lymphadenopathy. The largest node was previously 2.5 x 2.0 cm in the right infrahilar region and is now 1.9 x 1.3 cm (501/94). CHEST WALL AND LOWER NECK: Unremarkable. UPPER ABDOMEN: Right renal cysts. OSSEOUS STRUCTURES: Redemonstration of the large cystic lesion with sclerotic margins in T12 with destruction of the superior and inferior endplates, present as a much smaller thin-walled cystic lesion on the thoracic spine CT from 2013, imaged on a thoracic spine MRI from 2020. Impression: No pulmonary embolus.  Patchy consolidation right middle lobe, new from 8/25/2023, compatible with pneumonia. Slight regression of previous hilar and mediastinal lymphadenopathy. Improving left upper lobe opacity which may have been due to pneumonia with residual atelectasis/scar. Persistent partial atelectasis of the lower lobes. 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 Workstation performed: [id number]     Echo complete w/ contrast if indicated    Result Date: 8/28/2023  Narrative: •  Left Ventricle: Left ventricular cavity size is small. Wall thickness is increased. There is severe concentric hypertrophy. The left ventricular ejection fraction is 60%. Systolic function is normal. Wall motion is normal. Diastolic function is mildly abnormal, consistent with grade I (abnormal) relaxation. There is no LV dynamic obstruction at rest. •  Right Ventricle: Right ventricular cavity size is normal. Systolic function is normal. •  Left Atrium: The atrium is mildly dilated. •  Mitral Valve: There is mild to moderate regurgitation. There is systolic anterior motion of the chordal apparatus with late peaking gradient 29 mmHg during Valsalva maneuver. •  Pericardium: There is a trivial pericardial effusion along the right atrial free wall. XR chest portable ICU    Result Date: 8/28/2023  Narrative: CHEST INDICATION:   Acute hypoxic respiratory failure. COMPARISON: 8/25/2023 EXAM PERFORMED/VIEWS:  XR CHEST PORTABLE ICU FINDINGS: Heart shadow is enlarged but unchanged from prior exam. There is stable left-sided volume loss secondary to left basilar atelectasis. No pneumothorax or pleural effusion. Osseous structures appear within normal limits for patient age. Impression: Stable volume loss on the left secondary to left lower lobe atelectasis.  Workstation performed: [id number]     VAS lower limb venous duplex study, complete bilateral    Result Date: 8/27/2023  Narrative:  [facility] REPORT CLINICAL: Indications: Patient presents with bilateral lower extremity pain x 1 days. Operative History: cardiac surgery-date unknown. Risk Factors The patient has history of HTN and CKD. She has no history of Hyperlipidemia. CONCLUSION:  Impression: RIGHT LOWER LIMB: No evidence of acute or chronic deep vein thrombosis. No evidence of superficial thrombophlebitis noted. Doppler evaluation shows a normal response to augmentation maneuvers. . Popliteal, posterior tibial and anterior tibial arterial Doppler waveform's are triphasic. LEFT LOWER LIMB: No evidence of acute or chronic deep vein thrombosis. No evidence of superficial thrombophlebitis noted. Doppler evaluation shows a normal response to augmentation maneuvers. Popliteal, posterior tibial and anterior tibial arterial Doppler waveform's are triphasic. SIGNATURE: Electronically Signed by: Evens Auguste on 2023-08-27 08:12:52 PM    CTA ED chest PE Study    Result Date: 8/25/2023  Narrative: CTA - CHEST WITH IV CONTRAST - PULMONARY ANGIOGRAM INDICATION:   Increased SOB, recent COVID diagnosis. COMPARISON: MRI from 3/18/2020 as well as bone scan from 7/2/2019 and thoracic spine from 11/13/2013 TECHNIQUE: CTA examination of the chest was performed using angiographic technique according to a protocol specifically tailored to evaluate for pulmonary embolism. Multiplanar 2D reformatted images were created from the source data. In addition, coronal 3D MIP postprocessing was performed on the acquisition scanner. The study is limited by some respiratory motion artifacts especially in the lower lobes on the left where there is crowding of vessels due to atelectatic changes. Radiation dose length product (DLP) for this visit:  370 mGy-cm . This examination, like all CT scans performed in the [facility], was performed utilizing techniques to minimize radiation dose exposure, including the use of iterative reconstruction and automated exposure control. IV Contrast:  80 mL of iohexol (OMNIPAQUE) FINDINGS: PULMONARY ARTERIAL TREE: Limited visualization left lower lobe however there is suspicion for a small embolus in the posterobasal segment on axial image 139, series 305 and coronal image 142. No definite filling defect is seen elsewhere. The main pulmonary artery is significantly dilated at 4.2 cm. The RV LV ratio is elevated at 1.1 cm. The primary pulmonary vascular stent minutes are also dilated chronicity is uncertain. LUNGS: Emphysema is noted with blebs at the apices. There is peripheral consolidation in the left upper lobe. Air bronchogram is not well seen and there is mucous occlusion of the left mainstem bronchus with volume loss of the left upper lobe as well as  left lower lobe to a lesser extent. Some aeration in the left lower lobe is still visible. PLEURA:  Unremarkable. HEART/GREAT VESSELS:  Unremarkable for patient's age. No thoracic aortic aneurysm. MEDIASTINUM AND PRASANTH: 10 mm pretracheal node is identified. 9 mm superior mediastinal node is identified. 10 mm prevascular node is identified. Subcarinal node measures 1.5 cm. Hilar adenopathy is also demonstrated. Right hilar node is larger measuring 1.8 cm in short axis on image 138. CHEST WALL AND LOWER NECK:   Unremarkable. VISUALIZED STRUCTURES IN THE UPPER ABDOMEN: Low-density cyst is seen in the right kidney. . OSSEOUS STRUCTURES: There appears to be a destructive lesion involving the T12 vertebral body eroding both endplates and much of the vertebral body this does not appear to represent a Schmorl's node. A cystic lesion was noted in 2013 at this location however the current lesion is larger with loss of endplates. MRI also imaged this lesion in 2020 the lesion appears somewhat larger on the current examination however. Impression: Limited study. There is equivocal embolus in the posterior basal segment left lower lobe.  Other smaller segments are difficult to assess due to motion and vascular crowding. There is mucous plugging in the left mainstem bronchus with volume loss in portions of the left lower lobe and left upper lobe. Aspiration pneumonia is not excluded. There is dilatation of the main pulmonary artery and proximal pulmonary segment suggesting pulmonary hypertension this is more likely chronic than acute given the degree of distention. Emphysema is present. There is significant mediastinal and hilar adenopathy suggesting underlying neoplasm more likely than simple reactive nodes. Enlarging lesion at T12 is again demonstrated of uncertain etiology. This has been present since 2013. Perhaps a plasmacytoma is a consideration. Neoplastic work-up is advised. Pulmonology consultation is also advised to assess endobronchial obstruction on the left. This was discussed with resident physician Dr. Dwight Mathis at 4:58 p.m. Follow-up was marked in the epic system Workstation performed: [id number]     XR chest 1 view portable    Result Date: 8/25/2023  Narrative: CHEST INDICATION:   SOB. COMPARISON: 8/21/2021 EXAM PERFORMED/VIEWS:  XR CHEST PORTABLE Single view FINDINGS: Development of CHF. Probable left basal infiltrate. Cardiomediastinal silhouette has become more enlarged. No pneumothorax or pleural effusion. Osseous structures appear within normal limits for patient age. Impression: Increased cardiomegaly. Development of CHF. Probable left basal infiltrate Workstation performed: [id number]       Labs and pertinent reports reviewed. This note has been generated by voice recognition software system. Therefore, there may be spelling, grammar, and or syntax errors. Please contact if questions arise.     Harry Reed, DO   Hematology and Medical Oncology - PGY [place]

## 2023-09-19 NOTE — PROGRESS NOTES
[facility]  Progress Note  Name: Sigifredo Espinoza  MRN: [id number]  Unit/Bed#: ICU 06 I Date of Admission: 9/13/2023   Date of Service: 9/19/2023 I Hospital Day: 6    Assessment/Plan   Oropharyngeal mass  Assessment & Plan  · 9/14 Neck/ soft tissue CT: Large enhancing soft tissue mass identified within the left neck involving multiple spaces. This appears to be centered within the left oropharynx with extensions as described above into multiple spaces. This results in significant airway compromise. 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 Small level 3 and level 4 nodes are seen within the neck. · Tracheostomy by ENT, bx & addition testing performed  · Bx: Positive for malignancy, favor poorly differentiated carcinoma. Cannot entirely exclude a high grade large cell lymphoma. Portion of specimen submitted for flow cytometry. · H/o tobacco abuse. · Daughter was updated at bedside    Plan:  · ENT/ Med onc following, appreciated  · Follow final biopsy report  · Continue on vent per Trach at this moment    RML pneumonia  Assessment & Plan  · 9/13 CT PE study: Patchy consolidation right middle lobe, new from 8/25/2023, compatible with pneumonia. · No leukocytosis, no fever/chills. · Positive for sore throat, cough. · New onset pneumonia after recent hospitalization and antibiotics treatment. · 9/14: Bronchoscopy/ ABL. · MRSA negative. · ABL revealed 2+ Growth of Candida albicans    Plan:  · Zosyn completed  · Blood cultures neg      * Acute respiratory failure with hypoxia secondary to oropharyngeal mass  Assessment & Plan  • POA with O2 saturate around 80% on room air. Needed high flow 60 L to bring up her O2 saturation to 95%. • Recent hospitalization for respiratory failure second to pneumonia. • CTA PE study negative for PE. New onset RML PNA. • Persistent partial atelectasis of the lower lobes noted. • Current daily smoker.   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     Plan:  • Tracheostomy 9/17. • Completed Decadron. • DuoNeb. • Airway clearance protocol. IS.    PO (acute kidney injury) [facility])  Assessment & Plan  Lab Results   Component Value Date    CREATININE 0.98 09/18/2023    CREATININE 0.83 09/17/2023    CREATININE 0.89 09/16/2023       Likely prerenal.  Second to poor oral intake versus poor urine output. Baseline creatinine 1 to 1.2. Plan:  • Currently resolved  • Urinary retention protocol, Daily weights, I/O  • Monitor BMP daily and observe for downward trend of creatinine  • Avoid hypoperfusion of the kidneys, minimize nephrotoxins  • RAAS Blockers/Diuretics held: Lisinopril. (HFpEF) heart failure with preserved ejection fraction [facility])  Assessment & Plan  Wt Readings from Last 3 Encounters:   09/16/23 78.7 kg (173 lb 8 oz)   09/07/23 74.6 kg (164 lb 6.4 oz)   08/30/23 73.3 kg (161 lb 9.6 oz)     Lab Results   Component Value Date    LVEF 60 08/28/2023     (H) 09/13/2023     (H) 08/25/2023     • Volume status: Euvolemic. • POA physical (limited): JVD-, HJR-, B/L LE trace to 1+ edema. • Echo 8/28/23: LVEF 60%. D1DD.     Plan:  • CMP, magnesium; Goal Mg > 2 and K > 4; Replete prn  • HOB > 30°, Daily standing weights, Measure I/O  • Lasix 20 mg challenge if BP stable. Angioedema  Assessment & Plan  • POA with acute respiratory failure, SOB. • On physical in [place] ([place] ED: Swollen tongue, swelling soft and hard palate and almost the head of all phalanx is completely closed. Severe angioedema. • Decadron 10 mg, Benadryl 25 mg given. • Initially refused but eventually agreed with intubation. • Prior episode of angioedema in 2020 noted. Suspected coadministration of increase the dose of tramadol and lisinopril. • Per daughter, there is no recent change in medications except Trelegy inhaler, cefdinir. • Etiology: maybe 2/2 oropharyngeal mass compression.       Plan:  • Trach placed 9/17  • Off all drips except precedex 0.6, off vent 9/18 for several hours successfully      Ambulatory dysfunction  Assessment & Plan  PT/ OT eval before discharge. Pain syndrome, chronic  Assessment & Plan  · Home regimen: Oxycodone 5 mg twice daily as needed. Tylenol 650 mg every 8 hours as needed. Gabapentin 600 mg 3 times daily. Medical marijuana. · Per daughter, patient was overusing Tylenol over-the-counter. · Pain mostly from lumbar radiculopathy. · Impaired ambulatory dysfunction second to pain. Plan:  · Restarted gabapentin 300 3 times daily, scheduled oxycodone 5 mg 3 times daily. Hyperlipidemia  Assessment & Plan  Lab Results   Component Value Date    CHOLESTEROL 203 (H) 01/24/2023    CHOLESTEROL 177 08/11/2022    CHOLESTEROL 184 07/08/2020     Lab Results   Component Value Date    HDL 45 (L) 01/24/2023    HDL 37 (L) 08/11/2022    HDL 44 (L) 07/08/2020     Lab Results   Component Value Date    TRIG 166 (H) 09/16/2023    TRIG 160 (H) 01/24/2023    TRIG 108 08/11/2022     Lab Results   Component Value Date    NONHDLC 146 07/12/2018    [facility] (H) 04/29/2013     Continue outpatient diet/ lifestyle modification. Benign essential hypertension  Assessment & Plan  Well controlled at home. Home meds: Amlodipine 10 mg daily, Coreg 25 mg twice daily, lisinopril 10 mg daily. Currently on amlodipine 10 mg daily with somewhat controlled blood pressure. Have used hydralazine for further control. COPD without exacerbation ([address])  Assessment & Plan  Continue vent with nebs.     Blister (nonthermal) of left forearm, sequela  Assessment & Plan  · 9/17/23: Blisters of LUE noted, no signs of infection  · Daily wound care, monitor signs of infection     CKD (chronic kidney disease) stage 3, GFR 30-59 ml/min [facility])  Assessment & Plan  Lab Results   Component Value Date    EGFR 59 09/18/2023    EGFR 72 09/17/2023    EGFR 66 09/16/2023    CREATININE 0.98 09/18/2023    CREATININE 0.83 09/17/2023 CREATININE 0.89 09/16/2023     Stable. ICU Core Measures     Vented Patient  VAP Bundle  VAP bundle ordered     A: Assess, Prevent, and Manage Pain · Has pain been assessed? Yes  · Need for changes to pain regimen? No   B: Both Spontaneous Awakening Trials (SATs) and Spontaneous Breathing Trials (SBTs) · Plan to perform spontaneous awakening trial today? Yes   · Plan to perform spontaneous breathing trial today? Yes   · Obvious barriers to extubation? Yes   C: Choice of Sedation · RASS Goal: 0 Alert and Calm  · Need for changes to sedation or analgesia regimen? No   D: Delirium · CAM-ICU: Negative   E: Early Mobility  · Plan for early mobility? Yes   F: Family Engagement · Plan for family engagement today? Yes       Review of Invasive Devices: [place] Plan: Voiding trial after improvement in ambulation         Prophylaxis:  VTE VTE covered by:  enoxaparin, Subcutaneous, 40 mg at 09/18/23 0902       Stress Ulcer  covered byFamotidine (PF) (PEPCID) injection 20 mg [[id number]]       Subjective   HPI/24hr events: Fever episodes with Tmax 102. Patient was seen and examined at bedside. She seemed to be confused with place, not disorientation. She was told [place]. She was diaphoretic, tearing. Still low urine output. Pending biopsy final report. We will update patient's current status with family members at bedside later today.          Objective                            Vitals I/O      Most Recent Min/Max in 24hrs   Temp (!) 100.6 °F (38.1 °C) Temp  Min: 98.1 °F (36.7 °C)  Max: 102 °F (38.9 °C)   Pulse 80 Pulse  Min: 66  Max: 96   Resp (!) 26 Resp  Min: 15  Max: 27   /63 BP  Min: 114/57  Max: 200/87   O2 Sat 97 % SpO2  Min: 84 %  Max: 99 %      Intake/Output Summary (Last 24 hours) at 9/19/2023 3413  Last data filed at 9/19/2023 0601  Gross per 24 hour   Intake 871.85 ml   Output 2205 ml   Net -1333.15 ml         Diet Enteral/Parenteral; Tube Feeding No Oral Diet; Vital AF 1.2; Continuous; 40; 100; Water; Every 6 hours     Invasive Monitoring Physical exam    Physical Exam  Eyes:      General: Vision grossly intact. Right eye: No discharge. Left eye: No discharge. Extraocular Movements: Extraocular movements intact. Conjunctiva/sclera: Conjunctivae normal.      Comments: Left eye redness   Skin:     General: Skin is warm. Capillary Refill: Capillary refill takes less than 2 seconds. Coloration: Skin is not pale. Findings: Wound (Left forearm with gauze wrapped) present. HENT:      Head: Normocephalic and atraumatic. Right Ear: Hearing normal.      Left Ear: Hearing normal.      Nose: Rhinorrhea, epistaxis and nasogastric tube present. Mouth/Throat:      Mouth: Mucous membranes are moist. No injury. Neck:     Vascular: No JVD. Trachea: Tracheostomy present. Comments: Tracheostomy with bloody/yellowish drainageCardiovascular:      Rate and Rhythm: Normal rate and regular rhythm. Pulses: Normal pulses. Heart sounds: Normal heart sounds. Musculoskeletal:         General: No swelling or tenderness. Normal range of motion. Right lower leg: No edema. Left lower leg: No edema. Abdominal:      General: There is no distension. Palpations: Abdomen is soft. Tenderness: There is no abdominal tenderness. There is no guarding. Constitutional:       Appearance: She is ill-appearing and diaphoretic. Interventions: She is restrained. Comments: Diaphoretic   Pulmonary:      Effort: No accessory muscle usage, respiratory distress or accessory muscle usage. Breath sounds: Rhonchi present. No wheezing or rales. Chest:      Chest wall: No tenderness. Neurological:      Mental Status: She is alert. She is CAM ICU negative. Cranial Nerves: No dysarthria or facial asymmetry. Motor: Strength full and intact in all extremities.       Comments: Grief/tearing. Was confused to place, not disoriented. Genitourinary/Anorectal:  [place]         Diagnostic Studies      EKG: No new EKG. Imaging:   XR chest portable   Final Result by Vandana Kumar MD (09/18 1255)   Increased lung markings seen suggest congestion. The left base is obscured   No worsening            Workstation performed: [id number]         CT soft tissue neck w contrast   Final Result by Woo Wayne DO (09/14 1644)      Large enhancing soft tissue mass identified within the left neck involving multiple spaces. This appears to be centered within the left oropharynx with extensions as described above into multiple spaces. This results in significant airway compromise. 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 Small level 3 and level 4 nodes are seen within the neck. I personally discussed this study with ICU resident on 9/14/2023 4:44 PM.            Workstation performed: [id number]         MRI brain w wo contrast    (Results Pending)   MRI soft tissue neck wo and w contrast    (Results Pending)        I have personally reviewed pertinent reports.    and I have personally reviewed pertinent films in PACS     Medications:  Scheduled PRN   amLODIPine, 10 mg, Daily  busPIRone, 30 mg, TID  carvedilol, 25 mg, BID With Meals  chlorhexidine, 15 mL, Q12H ASHLEY  enoxaparin, 40 mg, Q24H ASHLEY  famotidine, 20 mg, Q24H ASHLEY  gabapentin, 300 mg, TID  levalbuterol, 1.25 mg, Q6H   And  ipratropium, 0.5 mg, Q6H  nicotine, 21 mg, Daily  nystatin, 500,000 Units, 4x Daily  oxyCODONE, 5 mg, Q8H ASHLEY  senna, 8.8 mg, HS  sertraline, 50 mg, Daily      fentanyl citrate (PF), 50 mcg, Q2H PRN  levalbuterol, 1.25 mg, Q8H PRN  polyethylene glycol, 17 g, Daily PRN       Continuous    dexmedetomidine, 0.1-1.2 mcg/kg/hr, Last Rate: 0.5 mcg/kg/hr (09/19/23 0244)  lactated ringers, 10 mL/hr         Labs:    CBC    Recent Labs     09/18/23  0559 09/19/23  0617   WBC 11.85* 12.59*   HGB 13.6 12.2   HCT 41.1 37.4    152     BMP    Recent Labs     09/18/23  0559   SODIUM 143   K 3.9   *   CO2 26   AGAP 6   BUN 24   CREATININE 0.98   CALCIUM 8.4       Coags    No recent results     Additional Electrolytes  Recent Labs     09/18/23  0559   MG 2.3   PHOS 2.4          Blood Gas    No recent results  No recent results LFTs  No recent results    Infectious  No recent results  Glucose  Recent Labs     09/18/23  0559   GLUC 112               Jolanta Mendoza MD

## 2023-09-19 NOTE — TELEPHONE ENCOUNTER
Soft Intake Form   Patient Details   Smiley Melara     1955     Reason For Appointment   Who is Calling? Megha Alcantar   If not patient, Name? NA    DID YOU CONFIRM INSURANCE WITH PATIENT? E verified, Routed to finance   Who is the Referring Doctor? Dr. Arpan Lamb   What is the diagnosis? Left Neck Mass, oropharyngeal with extension into the nasopharynx   Has this diagnosis been confirmed by a biopsy/surgery? If yes, what is the date it was done? LN bx taken on 9/18   Biopsy done at [facility]? If not, where was it done? Yes   Was imaging done, and was it done at [address] [place] [address]? If not, where was it done? Yes-[facility]   Have you been seen by another Oncologist?  If so, who and where (name of facility, city and state) No   For 2nd Opinions Only: Are you currently undergoing treatment, or are you scheduled to start treatment? If yes, name of facility, city and state  NA   For "History Of" only: Have you completed treatment? NA   Have you had Genetic Testing done in the past?  If so, advise to bring test results to their visit NA   Record Gathering Information   Did you advise to have records faxed to [phone]? NA   Did you advise to have disks sent to the proper address with imaging? ("History of" Patients)  5 years of imaging for breast patients-Mammos, US etc NA   Scheduling Information   Did you send new patient paperwork? Email or mail? NA   What is the best call back number? (If the [facility] is calling, please use their number) NA   Miscellaneous Information      The patient is scheduled for her HFU appointment with Dr. Erika Raymundo on 10/3 at 1120 in the [facility] office.

## 2023-09-20 ENCOUNTER — APPOINTMENT (INPATIENT)
Dept: MRI IMAGING | Facility: HOSPITAL | Age: 68
DRG: 004 | End: 2023-09-20
Payer: COMMERCIAL

## 2023-09-20 ENCOUNTER — DOCUMENTATION (OUTPATIENT)
Dept: HEMATOLOGY ONCOLOGY | Facility: CLINIC | Age: 68
End: 2023-09-20

## 2023-09-20 LAB
ALBUMIN SERPL BCP-MCNC: 2.6 G/DL (ref 3.5–5)
ALP SERPL-CCNC: 48 U/L (ref 34–104)
ALT SERPL W P-5'-P-CCNC: 14 U/L (ref 7–52)
ANION GAP SERPL CALCULATED.3IONS-SCNC: 7 MMOL/L
AST SERPL W P-5'-P-CCNC: 15 U/L (ref 13–39)
BASOPHILS # BLD AUTO: 0.03 THOUSANDS/ÂΜL (ref 0–0.1)
BASOPHILS NFR BLD AUTO: 0 % (ref 0–1)
BILIRUB SERPL-MCNC: 0.81 MG/DL (ref 0.2–1)
BUN SERPL-MCNC: 23 MG/DL (ref 5–25)
CALCIUM ALBUM COR SERPL-MCNC: 9.7 MG/DL (ref 8.3–10.1)
CALCIUM SERPL-MCNC: 8.6 MG/DL (ref 8.4–10.2)
CHLORIDE SERPL-SCNC: 108 MMOL/L (ref 96–108)
CO2 SERPL-SCNC: 27 MMOL/L (ref 21–32)
CREAT SERPL-MCNC: 0.76 MG/DL (ref 0.6–1.3)
EOSINOPHIL # BLD AUTO: 0.29 THOUSAND/ÂΜL (ref 0–0.61)
EOSINOPHIL NFR BLD AUTO: 3 % (ref 0–6)
ERYTHROCYTE [DISTWIDTH] IN BLOOD BY AUTOMATED COUNT: 16.8 % (ref 11.6–15.1)
FUNGUS SPEC CULT: ABNORMAL
GFR SERPL CREATININE-BSD FRML MDRD: 80 ML/MIN/1.73SQ M
GLUCOSE SERPL-MCNC: 93 MG/DL (ref 65–140)
HCT VFR BLD AUTO: 36.5 % (ref 34.8–46.1)
HGB BLD-MCNC: 11.4 G/DL (ref 11.5–15.4)
IMM GRANULOCYTES # BLD AUTO: 0.05 THOUSAND/UL (ref 0–0.2)
IMM GRANULOCYTES NFR BLD AUTO: 0 % (ref 0–2)
LYMPHOCYTES # BLD AUTO: 0.82 THOUSANDS/ÂΜL (ref 0.6–4.47)
LYMPHOCYTES NFR BLD AUTO: 7 % (ref 14–44)
MAGNESIUM SERPL-MCNC: 2.1 MG/DL (ref 1.9–2.7)
MCH RBC QN AUTO: 30.9 PG (ref 26.8–34.3)
MCHC RBC AUTO-ENTMCNC: 31.2 G/DL (ref 31.4–37.4)
MCV RBC AUTO: 99 FL (ref 82–98)
MONOCYTES # BLD AUTO: 0.91 THOUSAND/ÂΜL (ref 0.17–1.22)
MONOCYTES NFR BLD AUTO: 8 % (ref 4–12)
NEUTROPHILS # BLD AUTO: 9.48 THOUSANDS/ÂΜL (ref 1.85–7.62)
NEUTS SEG NFR BLD AUTO: 82 % (ref 43–75)
NRBC BLD AUTO-RTO: 0 /100 WBCS
PHOSPHATE SERPL-MCNC: 2.3 MG/DL (ref 2.3–4.1)
PLATELET # BLD AUTO: 153 THOUSANDS/UL (ref 149–390)
PMV BLD AUTO: 10.4 FL (ref 8.9–12.7)
POTASSIUM SERPL-SCNC: 3.9 MMOL/L (ref 3.5–5.3)
PROT SERPL-MCNC: 5.7 G/DL (ref 6.4–8.4)
RBC # BLD AUTO: 3.69 MILLION/UL (ref 3.81–5.12)
SODIUM SERPL-SCNC: 142 MMOL/L (ref 135–147)
WBC # BLD AUTO: 11.58 THOUSAND/UL (ref 4.31–10.16)

## 2023-09-20 PROCEDURE — 94640 AIRWAY INHALATION TREATMENT: CPT

## 2023-09-20 PROCEDURE — 99232 SBSQ HOSP IP/OBS MODERATE 35: CPT | Performed by: INTERNAL MEDICINE

## 2023-09-20 PROCEDURE — 94003 VENT MGMT INPAT SUBQ DAY: CPT

## 2023-09-20 PROCEDURE — 92610 EVALUATE SWALLOWING FUNCTION: CPT

## 2023-09-20 PROCEDURE — 88112 CYTOPATH CELL ENHANCE TECH: CPT | Performed by: PATHOLOGY

## 2023-09-20 PROCEDURE — 83735 ASSAY OF MAGNESIUM: CPT

## 2023-09-20 PROCEDURE — 94150 VITAL CAPACITY TEST: CPT

## 2023-09-20 PROCEDURE — 88305 TISSUE EXAM BY PATHOLOGIST: CPT | Performed by: PATHOLOGY

## 2023-09-20 PROCEDURE — 80053 COMPREHEN METABOLIC PANEL: CPT

## 2023-09-20 PROCEDURE — 70553 MRI BRAIN STEM W/O & W/DYE: CPT

## 2023-09-20 PROCEDURE — 84100 ASSAY OF PHOSPHORUS: CPT

## 2023-09-20 PROCEDURE — 85025 COMPLETE CBC W/AUTO DIFF WBC: CPT

## 2023-09-20 PROCEDURE — A9585 GADOBUTROL INJECTION: HCPCS

## 2023-09-20 PROCEDURE — 70543 MRI ORBT/FAC/NCK W/O &W/DYE: CPT

## 2023-09-20 PROCEDURE — 94760 N-INVAS EAR/PLS OXIMETRY 1: CPT

## 2023-09-20 RX ORDER — GADOBUTROL 604.72 MG/ML
7.5 INJECTION INTRAVENOUS
Status: COMPLETED | OUTPATIENT
Start: 2023-09-20 | End: 2023-09-20

## 2023-09-20 RX ORDER — HYDRALAZINE HYDROCHLORIDE 20 MG/ML
5 INJECTION INTRAMUSCULAR; INTRAVENOUS EVERY 6 HOURS PRN
Status: DISCONTINUED | OUTPATIENT
Start: 2023-09-20 | End: 2023-09-21

## 2023-09-20 RX ORDER — ACETAMINOPHEN 325 MG/1
975 TABLET ORAL ONCE
Status: DISCONTINUED | OUTPATIENT
Start: 2023-09-20 | End: 2023-09-20

## 2023-09-20 RX ORDER — ACETAMINOPHEN 325 MG/1
650 TABLET ORAL EVERY 6 HOURS PRN
Status: DISCONTINUED | OUTPATIENT
Start: 2023-09-20 | End: 2023-10-27

## 2023-09-20 RX ADMIN — CHLORHEXIDINE GLUCONATE 15 ML: 1.2 SOLUTION ORAL at 09:17

## 2023-09-20 RX ADMIN — Medication 8.8 MG: at 21:53

## 2023-09-20 RX ADMIN — GABAPENTIN 300 MG: 300 CAPSULE ORAL at 17:30

## 2023-09-20 RX ADMIN — OXYCODONE HYDROCHLORIDE 5 MG: 5 TABLET ORAL at 21:54

## 2023-09-20 RX ADMIN — NICOTINE 21 MG: 21 PATCH, EXTENDED RELEASE TRANSDERMAL at 09:18

## 2023-09-20 RX ADMIN — BUSPIRONE HYDROCHLORIDE 30 MG: 10 TABLET ORAL at 21:54

## 2023-09-20 RX ADMIN — LEVALBUTEROL HYDROCHLORIDE 1.25 MG: 1.25 SOLUTION RESPIRATORY (INHALATION) at 07:20

## 2023-09-20 RX ADMIN — AMLODIPINE BESYLATE 10 MG: 5 TABLET ORAL at 09:19

## 2023-09-20 RX ADMIN — CARVEDILOL 25 MG: 12.5 TABLET, FILM COATED ORAL at 06:35

## 2023-09-20 RX ADMIN — OXYCODONE HYDROCHLORIDE 5 MG: 5 TABLET ORAL at 13:23

## 2023-09-20 RX ADMIN — IPRATROPIUM BROMIDE 0.5 MG: 0.5 SOLUTION RESPIRATORY (INHALATION) at 21:38

## 2023-09-20 RX ADMIN — SERTRALINE HYDROCHLORIDE 50 MG: 50 TABLET ORAL at 09:18

## 2023-09-20 RX ADMIN — NYSTATIN 500000 UNITS: 100000 SUSPENSION ORAL at 17:31

## 2023-09-20 RX ADMIN — IPRATROPIUM BROMIDE 0.5 MG: 0.5 SOLUTION RESPIRATORY (INHALATION) at 01:29

## 2023-09-20 RX ADMIN — GADOBUTROL 7.5 ML: 604.72 INJECTION INTRAVENOUS at 20:58

## 2023-09-20 RX ADMIN — GABAPENTIN 300 MG: 300 CAPSULE ORAL at 21:54

## 2023-09-20 RX ADMIN — GUAIFENESIN 200 MG: 200 SOLUTION ORAL at 03:00

## 2023-09-20 RX ADMIN — HYDRALAZINE HYDROCHLORIDE 5 MG: 20 INJECTION, SOLUTION INTRAMUSCULAR; INTRAVENOUS at 18:24

## 2023-09-20 RX ADMIN — NYSTATIN 500000 UNITS: 100000 SUSPENSION ORAL at 12:48

## 2023-09-20 RX ADMIN — LEVALBUTEROL HYDROCHLORIDE 1.25 MG: 1.25 SOLUTION RESPIRATORY (INHALATION) at 14:23

## 2023-09-20 RX ADMIN — BUSPIRONE HYDROCHLORIDE 30 MG: 10 TABLET ORAL at 09:25

## 2023-09-20 RX ADMIN — ENOXAPARIN SODIUM 40 MG: 40 INJECTION SUBCUTANEOUS at 09:17

## 2023-09-20 RX ADMIN — BUSPIRONE HYDROCHLORIDE 30 MG: 10 TABLET ORAL at 17:31

## 2023-09-20 RX ADMIN — OXYCODONE HYDROCHLORIDE 5 MG: 5 TABLET ORAL at 05:06

## 2023-09-20 RX ADMIN — CHLORHEXIDINE GLUCONATE 15 ML: 1.2 SOLUTION ORAL at 21:54

## 2023-09-20 RX ADMIN — FAMOTIDINE 20 MG: 10 INJECTION INTRAVENOUS at 09:15

## 2023-09-20 RX ADMIN — GABAPENTIN 300 MG: 300 CAPSULE ORAL at 09:18

## 2023-09-20 RX ADMIN — OXYCODONE HYDROCHLORIDE 5 MG: 5 SOLUTION ORAL at 09:24

## 2023-09-20 RX ADMIN — ACETAMINOPHEN 650 MG: 325 TABLET, FILM COATED ORAL at 21:54

## 2023-09-20 RX ADMIN — NYSTATIN 500000 UNITS: 100000 SUSPENSION ORAL at 21:53

## 2023-09-20 RX ADMIN — LEVALBUTEROL HYDROCHLORIDE 1.25 MG: 1.25 SOLUTION RESPIRATORY (INHALATION) at 01:29

## 2023-09-20 RX ADMIN — GUAIFENESIN 200 MG: 200 SOLUTION ORAL at 21:00

## 2023-09-20 RX ADMIN — GUAIFENESIN 200 MG: 200 SOLUTION ORAL at 13:23

## 2023-09-20 RX ADMIN — IPRATROPIUM BROMIDE 0.5 MG: 0.5 SOLUTION RESPIRATORY (INHALATION) at 07:20

## 2023-09-20 RX ADMIN — NYSTATIN 500000 UNITS: 100000 SUSPENSION ORAL at 09:18

## 2023-09-20 RX ADMIN — LEVALBUTEROL HYDROCHLORIDE 1.25 MG: 1.25 SOLUTION RESPIRATORY (INHALATION) at 21:37

## 2023-09-20 RX ADMIN — CARVEDILOL 25 MG: 12.5 TABLET, FILM COATED ORAL at 17:30

## 2023-09-20 RX ADMIN — GUAIFENESIN 200 MG: 200 SOLUTION ORAL at 09:15

## 2023-09-20 RX ADMIN — IPRATROPIUM BROMIDE 0.5 MG: 0.5 SOLUTION RESPIRATORY (INHALATION) at 14:23

## 2023-09-20 NOTE — SPEECH THERAPY NOTE
Speech-Language Pathology Bedside Swallow Evaluation        Patient Name: Shantal Rapp    UTGDG'Q Date: 9/20/2023     Problem List  Principal Problem:    Acute respiratory failure with hypoxia secondary to oropharyngeal mass  Active Problems:    Benign essential hypertension    Hyperlipidemia    Pain syndrome, chronic    CKD (chronic kidney disease) stage 3, GFR 30-59 ml/min (HCC)    (HFpEF) heart failure with preserved ejection fraction (HCC)    Ambulatory dysfunction    Angioedema    RML pneumonia    PO (acute kidney injury) ([address])    Oropharyngeal mass    Blister (nonthermal) of left forearm, sequela    Encephalopathy    COPD without exacerbation ([address])         Summary    Pt presents with noel silent aspiration w/ puree and NTL from trach indicating significant pharyngeal dysphagia. Recommendations:   Diet: NPO with tube feeds   Meds: non-oral if possible   Frequent Oral care: 2x/day  Other Recommendations/ considerations: consider alternate means of nutrition long term. Will cont to follow for possible PMV eval.       Current Medical Status  Pt is a 69 y.o. female who presented to [facility]  with acute resp failure w/ hypoxia 2* oropharyngeal mass. Patient was complaining of left-sided facial discomfort for several days, along with poor oral intake. Patient wears dentures usually only the top half because the bottom half does not fit properly. New medication recently with cefdinir and Trelegy. Today came to the emergency room due to shortness of breath, found to have diffuse tongue swelling, was intubated after goals of care discussion and transferred on propofol. #7 cuffed proximal XLT Trach placed 9/17/23     Past medical history:   Please see H&P for details    Special Studies:  CXR: 9/19/23 Pulmonary vascular congestion with obscuration of the left hemidiaphragm. This is stable from prior radiograph and may represent small left effusion versus consolidation.    Right basilar atelectasis versus trace right effusion. CTsoft tissue neck w/ contrast: 9/14/23  Large enhancing soft tissue mass identified within the left neck involving multiple spaces. This appears to be centered within the left oropharynx with extensions as described above into multiple spaces. This results in significant airway compromise. 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 Small level 3 and level 4 nodes are seen within the neck. Social/Education/Vocational Hx:  Pt lives with family    Swallow Information   Current Risks for Dysphagia & Aspiration: trach placement,lg nasopharyngeal mass  Current Symptoms/Concerns: trach, lg NP mass  Current Diet: NPO   Baseline Diet: regular diet and thin liquids  Takes pills- whole w/ liquids    Baseline Assessment   Behavior/Cognition: alert  Speech/Language Status: able to follow commands and limited verbal output  Patient Positioning: upright in chair     Swallow Mechanism Exam   Facial: symmetrical  Labial: WFL  Lingual: left sided tongue deviation and decreased strength left side  Velum: unable to visualize  Mandible: adequate ROM  Dentition: edentulous- has upper denture, not used for eval  Vocal quality:harsh sounds, no phonation    Volitional Cough: strong/productive   Respiratory: #7 trach, cuff deflated    Consistencies Assessed and Performance   Consistencies Administered: nectar thick and puree  (kiara pudding & blue dyed NTL)    Oral Stage: pt w/ fair bolus retrieval from spoon, slow manipulation & transfer. Mild labial leakage on L w/ NTL. Pharyngeal Stage: swallow initiation appeared mildly delayed w/ fairly good laryngeal excursion. No spont coughing noted, however increased wet respirations noted and  blue dyed residue and kiara pudding residue noted in tracheal secretions w/ volitional coughing.         Esophageal Concerns: none reported      Results Reviewed with: patient, RN, MD and PA   Dysphagia Goals: none at this time      April Leticia Bradford, [address] [facility] [address]   Speech Pathologist  [place] license # [id number] [place] [address] [id number]  [place] license # [id number]  Available via [employer]

## 2023-09-20 NOTE — PROGRESS NOTES
Intake received/ Chart reviewed for services completed outside of [place]    Pathology completed: 9/17/23    Imaging completed: CTA ed chest pe study 9/13/23, 8/25/23- CT soft tissue neck 9/17/23    All records needed are in patients chart. No records retrieval needed at this time.

## 2023-09-20 NOTE — PROGRESS NOTES
OTOLARYNGOLOGY PROGRESS NOTE    Date of Service: 9/20/2023 6:09 PM    HPI  Patient is a 69 y.o. female w/ recent COVID/pneumonia requiring admission for infectious control earlier this yr, recently dc, [facility] on 9/13 due to SOB and hypoxia, w/ limited oral cavity space observed. Pt agreed to intubation for airway management on 9/14. CT imaging demonstrated L naso/oropharyngeal mass w/ some level 3/4 neck LAD, new R middle lobe patchy consolidation. Daughter at bedside answered questions, including pt is an active tobacco user for many yr, and has complained of worsening difficulty w/ breathing, "gum swelling," and discomfort at back of mouth for past 1.5-2wk. Pt's cause of acute difficult airway was attributed to angioedema due to lisinopril, w/ previous "angioedema" episode in 2020. Ddx being cancer vs infectious etiologies. Overnight Events: POD 4 s/p trach. On vent intermittently     WBC: 12.59 (11.85) on zosyn & vanc    PHYSICAL EXAM:  Vitals:    09/20/23 1730   BP: (!) 184/88   Pulse: 105   Resp:    Temp:    SpO2:         General: Intubated  HEENT:  7 cuffed proximal XLT tracheostomy in place, CDI, minimal mucus suctioned   Neurology: Cannot assess neurologic fx  Lungs:  On ventilator  Cardio: RRR, well perfused  Abd: soft, NT, ND       Intake/Output Summary (Last 24 hours) at 9/20/2023 1809  Last data filed at 9/20/2023 1200  Gross per 24 hour   Intake 360 ml   Output 610 ml   Net -250 ml         LABORATORY    Recent Labs     09/18/23  0559 09/19/23  0617 09/19/23  0704 09/20/23  0527   WBC 11.85* 12.59*  --  11.58*   HGB 13.6 12.2 11.6 11.4*   HCT 41.1 37.4 34* 36.5    152  --  153       Recent Labs     09/18/23  0559 09/19/23  0617 09/20/23  0527   K 3.9 3.6 3.9   * 109* 108   BUN 24 27* 23   PHOS 2.4 2.4 2.3   MG 2.3 2.2 2.1       Invalid input(s): "PTPAT", "PTINR", "APTTMNNM", "APTTPAT"      Patient Active Problem List   Diagnosis   • Benign essential hypertension   • Bipolar I disorder, single manic episode ([facility])   • Disc degeneration, lumbar   • 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   • Hyperlipidemia   • Lumbar radiculopathy   • Myofascial pain syndrome   • Pain syndrome, chronic   • Osteoarthritis of spine with radiculopathy, lumbar region   • Angio-edema, initial encounter   • Abnormal computed tomography angiography (CTA)   • Bipolar disorder, unspecified ([facility])   • Nicotine dependence   • Bone lesion   • CKD (chronic kidney disease) stage 3, GFR 30-59 ml/min ([facility])   • Acute respiratory failure with hypoxia secondary to oropharyngeal mass   • (HFpEF) heart failure with preserved ejection fraction ([facility])   • Pulmonary embolism ([facility])   • Vaginal itching   • Ambulatory dysfunction   • Angioedema   • RML pneumonia   • PO (acute kidney injury) ([facility])   • Oropharyngeal mass   • Blister (nonthermal) of left forearm, sequela   • Encephalopathy   • COPD without exacerbation ([facility])         ASSESSMENT  Patient is a 69 y.o. female w/ acute and chronic problems as above, w/ naso/oropharyngeal mass observed on CT, likely inducing diminished airway space, requiring intubation for airway protection, now POD 4 s/p trach    PLAN  - biopsy consistent with lymphoma- management per med onc  - wean vent per ICU  - can change to uncuffed trach allowing for improved swallow and PMV once she is no longer requiring vent  - rest of care per primary  - ENT continues following

## 2023-09-20 NOTE — PROGRESS NOTES
[facility]  Progress Note  Name: Dion Sahu  MRN: [id number]  Unit/Bed#: ICU 06 I Date of Admission: 9/13/2023   Date of Service: 9/20/2023 I Hospital Day: 7    Assessment/Plan   Oropharyngeal mass  Assessment & Plan  · 9/14 Neck/ soft tissue CT: Large enhancing soft tissue mass identified within the left neck involving multiple spaces. This appears to be centered within the left oropharynx with extensions as described above into multiple spaces. This results in significant airway compromise. 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 Small level 3 and level 4 nodes are seen within the neck. · Tracheostomy by ENT, bx & addition testing performed  · Bx: Positive for malignancy, favor poorly differentiated carcinoma. Cannot entirely exclude a high grade large cell lymphoma. Portion of specimen submitted for flow cytometry. · H/o tobacco abuse, daily smoker. · Daughter was updated at bedside. Plan:  · ENT/ Med onc following, appreciated  · Follow final biopsy report  · Continue on vent per Trach at this moment  · Off vent if able    RML pneumonia  Assessment & Plan  · 9/13 CT PE study: Patchy consolidation right middle lobe, new from 8/25/2023, compatible with pneumonia. · No leukocytosis, no fever/chills. · Positive for sore throat, cough. · New onset pneumonia after recent hospitalization and antibiotics treatment. · 9/14: Bronchoscopy/ ABL. · MRSA negative. · ABL revealed 2+ Growth of Candida albicans, suspected contaminant. Plan:  · Zosyn x5 days completed. · Blood cultures neg. · Follow fever curve. * Acute respiratory failure with hypoxia secondary to oropharyngeal mass  Assessment & Plan  • POA with O2 saturate around 80% on room air. Needed high flow 60 L to bring up her O2 saturation to 95%. • Recent hospitalization for respiratory failure second to pneumonia. • CTA PE study negative for PE. New onset RML PNA.   • Persistent partial atelectasis of the lower lobes noted. • Current daily smoker. 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     Plan:  • Tracheostomy 9/17. • Completed Decadron. • DuoNeb. • Airway clearance protocol. IS.    PO (acute kidney injury) [facility])  Assessment & Plan  Lab Results   Component Value Date    CREATININE 0.76 09/20/2023    CREATININE 0.93 09/19/2023    CREATININE 0.98 09/18/2023       Likely prerenal.  Second to poor oral intake versus poor urine output. Baseline creatinine 1 to 1.2. Plan:  • Currently resolved  • Urinary retention protocol, Daily weights, I/O  • Monitor BMP daily and observe for downward trend of creatinine  • Avoid hypoperfusion of the kidneys, minimize nephrotoxins  • RAAS Blockers/Diuretics held: Lisinopril. (HFpEF) heart failure with preserved ejection fraction [facility])  Assessment & Plan  Wt Readings from Last 3 Encounters:   09/20/23 81.5 kg (179 lb 10.8 oz)   09/07/23 74.6 kg (164 lb 6.4 oz)   08/30/23 73.3 kg (161 lb 9.6 oz)     Lab Results   Component Value Date    LVEF 60 08/28/2023     (H) 09/13/2023     (H) 08/25/2023     • Volume status: Euvolemic. • POA physical (limited): JVD-, HJR-, B/L LE trace to 1+ edema. • Echo 8/28/23: LVEF 60%. D1DD.     Plan:  • CMP, magnesium; Goal Mg > 2 and K > 4; Replete prn  • HOB > 30°, Daily standing weights, Measure I/O    Angioedema  Assessment & Plan  • POA with acute respiratory failure, SOB. • On physical in [place] [facility] ED: Swollen tongue, swelling soft and hard palate and almost the head of all phalanx is completely closed. Severe angioedema. • Decadron 10 mg, Benadryl 25 mg given. • Initially refused but eventually agreed with intubation. • Prior episode of angioedema in 2020 noted. Suspected coadministration of increase the dose of tramadol and lisinopril. • Per daughter, there is no recent change in medications except Trelegy inhaler, cefdinir.   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 Plan:  • Trach placed 9/17    Ambulatory dysfunction  Assessment & Plan  PT/ OT eval before discharge. Pain syndrome, chronic  Assessment & Plan  · Home regimen: Oxycodone 5 mg twice daily as needed. Tylenol 650 mg every 8 hours as needed. Gabapentin 600 mg 3 times daily. Medical marijuana. · Per daughter, patient was overusing Tylenol over-the-counter. · Pain mostly from lumbar radiculopathy. · Impaired ambulatory dysfunction second to pain. Plan:  · Restarted gabapentin 300 3 times daily, scheduled oxycodone 5 mg 3 times daily. Hyperlipidemia  Assessment & Plan  Lab Results   Component Value Date    CHOLESTEROL 203 (H) 01/24/2023    CHOLESTEROL 177 08/11/2022    CHOLESTEROL 184 07/08/2020     Lab Results   Component Value Date    HDL 45 (L) 01/24/2023    HDL 37 (L) 08/11/2022    HDL 44 (L) 07/08/2020     Lab Results   Component Value Date    TRIG 166 (H) 09/16/2023    TRIG 160 (H) 01/24/2023    TRIG 108 08/11/2022     Lab Results   Component Value Date    NONHDLC 146 07/12/2018    [address] (H) 04/29/2013     Continue outpatient diet/ lifestyle modification. Benign essential hypertension  Assessment & Plan  · Well controlled at home. · Home meds: Amlodipine 10 mg daily, Coreg 25 mg twice daily, lisinopril 10 mg daily. Plan:  · Currently on amlodipine 10 mg daily and Coreg 25 mg BID for BP controll. · PRN hydralazine if BP >160. · Avoid ACEI/ ARB even currently less likely cause of angioedema. COPD without exacerbation ([address])  Assessment & Plan  Continue vent with nebs. Encephalopathy  Assessment & Plan  9/19- Pt appeared to be OAA x3. Improving.     Blister (nonthermal) of left forearm, sequela  Assessment & Plan  · 9/17/23: Blisters of LUE noted, no signs of infection  · Daily wound care, monitor signs of infection     CKD (chronic kidney disease) stage 3, GFR 30-59 ml/min [facility])  Assessment & Plan  Lab Results   Component Value Date    EGFR 80 09/20/2023    EGFR 62 09/19/2023    EGFR 59 09/18/2023    CREATININE 0.76 09/20/2023    CREATININE 0.93 09/19/2023    CREATININE 0.98 09/18/2023     Stable. ICU Core Measures     Vented Patient  VAP Bundle  VAP bundle ordered     A: Assess, Prevent, and Manage Pain · Has pain been assessed? Yes  · Need for changes to pain regimen? No   B: Both Spontaneous Awakening Trials (SATs) and Spontaneous Breathing Trials (SBTs) · Plan to perform spontaneous awakening trial today? Yes   · Plan to perform spontaneous breathing trial today? Yes   · Obvious barriers to extubation? No   C: Choice of Sedation · RASS Goal: 0 Alert and Calm  · Need for changes to sedation or analgesia regimen? No   D: Delirium · CAM-ICU: Negative   E: Early Mobility  · Plan for early mobility? Yes   F: Family Engagement · Plan for family engagement today? Yes       Review of Invasive Devices:            Prophylaxis:  VTE VTE covered by:  enoxaparin, Subcutaneous, 40 mg at 09/19/23 0810       Stress Ulcer  covered byFamotidine (PF) (PEPCID) injection 20 mg [[id number]]       Subjective   HPI/24hr events: No acute overnight event. Patient was seen and examined at bedside. She was OOA x3, NAD. Complained about left eye pain with headache. There is no discharge. Also complained about nausea/vomiting. As needed Zofran placed. Appetite okay. Bowel movement yesterday. Urination this morning as well. Awaiting for neck tissue/ brain MRI and the final biopsy results.        Objective                            Vitals I/O      Most Recent Min/Max in 24hrs   Temp 99 °F (37.2 °C) Temp  Min: 98.2 °F (36.8 °C)  Max: 99.9 °F (37.7 °C)   Pulse (!) 109 Pulse  Min: 88  Max: 111   Resp (!) 27 Resp  Min: 13  Max: 29   /70 BP  Min: 112/58  Max: 160/70   O2 Sat 94 % SpO2  Min: 90 %  Max: 99 %      Intake/Output Summary (Last 24 hours) at 9/20/2023 0731  Last data filed at 9/20/2023 0200  Gross per 24 hour   Intake 340.44 ml   Output 340 ml   Net 0.44 ml         Diet Enteral/Parenteral; Tube Feeding No Oral Diet; Vital AF 1.2; Continuous; 40; 100; Water; Every 6 hours     Invasive Monitoring Physical exam    Physical Exam  Eyes:      General: Vision grossly intact. No foreign body in eye        Right eye: No discharge. Left eye: Discharge (clear, erythmaous) present. Extraocular Movements: Extraocular movements intact. Conjunctiva/sclera: Conjunctivae normal.   Skin:     General: Skin is warm. Capillary Refill: Capillary refill takes less than 2 seconds. Coloration: Skin is not pale. HENT:      Head: Normocephalic and atraumatic. Right Ear: Hearing normal.      Left Ear: Hearing normal.      Nose: Rhinorrhea and nasogastric tube present. No epistaxis. Mouth/Throat:      Mouth: Mucous membranes are moist.      Comments: Verbalized posterior oral mass. Neck:      Vascular: No JVD. Trachea: Tracheostomy present. Midline tenderness Cardiovascular:      Rate and Rhythm: Normal rate and regular rhythm. Pulses: Normal pulses. Heart sounds: Normal heart sounds. Musculoskeletal:         General: No swelling or tenderness. Normal range of motion. Right lower leg: No edema. Left lower leg: No edema. Abdominal:      General: There is no distension. Palpations: Abdomen is soft. Tenderness: There is no abdominal tenderness. There is no guarding. Constitutional:       General: She is not in acute distress. Comments: Diaphoretic. Pulmonary:      Effort: No accessory muscle usage, respiratory distress or accessory muscle usage. Breath sounds: Rhonchi present. No wheezing or rales. Chest:      Chest wall: No tenderness. Psychiatric:         Mood and Affect:  mood and affect abnormal.  Neurological:      General: No focal deficit present. Mental Status: She is alert and oriented to person, place and time. She is CAM ICU negative. Cranial Nerves: No facial asymmetry. Motor: Strength full and intact in all extremities. Genitourinary/Anorectal:  external catheter         Diagnostic Studies      EKG: No new EKG. Imaging: No new imaging. I have personally reviewed pertinent reports.    and I have personally reviewed pertinent films in PACS     Medications:  Scheduled PRN   acetaminophen, 975 mg, Once  amLODIPine, 10 mg, Daily  busPIRone, 30 mg, TID  carvedilol, 25 mg, BID With Meals  chlorhexidine, 15 mL, Q12H ASHLEY  enoxaparin, 40 mg, Q24H ASHLEY  famotidine, 20 mg, Q24H ASHLEY  gabapentin, 300 mg, TID  guaiFENesin, 200 mg, Q6H  levalbuterol, 1.25 mg, Q6H   And  ipratropium, 0.5 mg, Q6H  nicotine, 21 mg, Daily  nystatin, 500,000 Units, 4x Daily  oxyCODONE, 5 mg, Q8H ASHLEY  senna, 8.8 mg, HS  sertraline, 50 mg, Daily      fentanyl citrate (PF), 50 mcg, Q2H PRN  levalbuterol, 1.25 mg, Q8H PRN  ondansetron, 4 mg, Q6H PRN  oxyCODONE, 2.5 mg, Q4H PRN   Or  oxyCODONE, 5 mg, Q4H PRN  polyethylene glycol, 17 g, Daily PRN       Continuous    lactated ringers, 10 mL/hr         Labs:    CBC    Recent Labs     09/19/23 0617 09/19/23 0704 09/20/23  0527   WBC 12.59*  --  11.58*   HGB 12.2 11.6 11.4*   HCT 37.4 34* 36.5     --  153     BMP    Recent Labs     09/19/23 0617 09/19/23  0704 09/20/23  0527   SODIUM 141  --  142   K 3.6  --  3.9   *  --  108   CO2 27 29 27   AGAP 5  --  7   BUN 27*  --  23   CREATININE 0.93  --  0.76   CALCIUM 8.4  --  8.6       Coags    No recent results     Additional Electrolytes  Recent Labs     09/19/23 0617 09/19/23  0704 09/20/23  0527   MG 2.2  --  2.1   PHOS 2.4  --  2.3   CAIONIZED  --  1.19  --           Blood Gas    No recent results  No recent results LFTs  Recent Labs     09/20/23  0527   ALT 14   AST 15   ALKPHOS 48   ALB 2.6*   TBILI 0.81       Infectious  No recent results  Glucose  Recent Labs     09/19/23  0617 09/20/23  0527   GLUC 135 93                 Anastasiya Jo MD

## 2023-09-20 NOTE — CASE MANAGEMENT
Case Management Progress Note    Patient name Jeanette Cheng  Location ICU 06/ICU 06 MRN [id number]  : 1955 Date 2023       LOS (days): 7  Geometric Mean LOS (GMLOS) (days): 24.20  Days to GMLOS:17.2        OBJECTIVE:        Current admission status: Inpatient  Preferred Pharmacy:   [id number] [place] - [address] [facility]. [address] [facility] [address]  Phone: [phone] Fax: [phone] ([facility] [place], [place] - [address] [facility] [facility]  [address] [facility] [address]  Phone: [phone] Fax: [phone]    Primary Care Provider: Mary Hargrove MD    Primary Insurance: [facility]  Secondary Insurance: [facility]    PROGRESS NOTE:    CM received a return call from pt  - Quynh Salazar. Quynh Salazar made aware that pt daughter is concerned about hours for pt in the home while the daughter is at work. Quynh Salazar made aware one of pt new needs is a tracheostomy. Quynh Salazar reports she can be contacted/updated when dc plan/date so she can start working on a temporary increase in [place] support at home. Quynh Salazar reports they will also have to do an assessment of pt needs during that time to determine how many hours pt would be approved for. Quynh Salazar reports pt would also have to be accepting of services outside of her daughter coming into the home. As per Quynh Salazar pt has always preferred her daughter. Quynh Salazar reports that if pt goes to a facility she will work on increasing pt hours based on her need once d/c from the facility. If pt d/c to rehab she would like to be notified at which rehab pt would dc to. Quynh Salazar reports she will contact pt daughter to review process and answer any questions she may have.

## 2023-09-20 NOTE — SOCIAL WORK
LSW attempted to see patient/family today. Pt/family unable to meet with this writer. LSW will f/u tomorrow 9/21 for support.

## 2023-09-21 ENCOUNTER — APPOINTMENT (INPATIENT)
Dept: CT IMAGING | Facility: HOSPITAL | Age: 68
DRG: 004 | End: 2023-09-21
Payer: COMMERCIAL

## 2023-09-21 ENCOUNTER — APPOINTMENT (OUTPATIENT)
Dept: ULTRASOUND IMAGING | Facility: HOSPITAL | Age: 68
DRG: 004 | End: 2023-09-21
Payer: COMMERCIAL

## 2023-09-21 PROBLEM — N17.9 AKI (ACUTE KIDNEY INJURY) (HCC): Status: RESOLVED | Noted: 2023-09-13 | Resolved: 2023-09-21

## 2023-09-21 PROBLEM — G93.40 ENCEPHALOPATHY: Status: RESOLVED | Noted: 2023-09-18 | Resolved: 2023-09-21

## 2023-09-21 PROBLEM — D70.1 CHEMOTHERAPY INDUCED NEUTROPENIA: Status: ACTIVE | Noted: 2023-09-21

## 2023-09-21 PROBLEM — T45.1X5A CHEMOTHERAPY INDUCED NEUTROPENIA: Status: ACTIVE | Noted: 2023-09-21

## 2023-09-21 PROBLEM — C85.80 LARGE CELL LYMPHOMA (HCC): Status: ACTIVE | Noted: 2023-09-21

## 2023-09-21 LAB
ALBUMIN SERPL BCP-MCNC: 2.6 G/DL (ref 3.5–5)
ALP SERPL-CCNC: 65 U/L (ref 34–104)
ALT SERPL W P-5'-P-CCNC: 19 U/L (ref 7–52)
ANION GAP SERPL CALCULATED.3IONS-SCNC: 9 MMOL/L
AST SERPL W P-5'-P-CCNC: 20 U/L (ref 13–39)
BASOPHILS # BLD AUTO: 0.02 THOUSANDS/ÂΜL (ref 0–0.1)
BASOPHILS NFR BLD AUTO: 0 % (ref 0–1)
BILIRUB SERPL-MCNC: 0.54 MG/DL (ref 0.2–1)
BUN SERPL-MCNC: 20 MG/DL (ref 5–25)
CALCIUM ALBUM COR SERPL-MCNC: 10.1 MG/DL (ref 8.3–10.1)
CALCIUM SERPL-MCNC: 9 MG/DL (ref 8.4–10.2)
CHLORIDE SERPL-SCNC: 105 MMOL/L (ref 96–108)
CO2 SERPL-SCNC: 27 MMOL/L (ref 21–32)
CREAT SERPL-MCNC: 0.75 MG/DL (ref 0.6–1.3)
EOSINOPHIL # BLD AUTO: 0.33 THOUSAND/ÂΜL (ref 0–0.61)
EOSINOPHIL NFR BLD AUTO: 3 % (ref 0–6)
ERYTHROCYTE [DISTWIDTH] IN BLOOD BY AUTOMATED COUNT: 16.2 % (ref 11.6–15.1)
GFR SERPL CREATININE-BSD FRML MDRD: 81 ML/MIN/1.73SQ M
GLUCOSE SERPL-MCNC: 140 MG/DL (ref 65–140)
HBV CORE AB SER QL: NORMAL
HBV CORE IGM SER QL: NORMAL
HBV SURFACE AG SER QL: NORMAL
HCT VFR BLD AUTO: 35.6 % (ref 34.8–46.1)
HCV AB SER QL: NORMAL
HGB BLD-MCNC: 11.4 G/DL (ref 11.5–15.4)
HIV 1+2 AB+HIV1 P24 AG SERPL QL IA: NORMAL
HIV 2 AB SERPL QL IA: NORMAL
HIV1 AB SERPL QL IA: NORMAL
HIV1 P24 AG SERPL QL IA: NORMAL
IMM GRANULOCYTES # BLD AUTO: 0.05 THOUSAND/UL (ref 0–0.2)
IMM GRANULOCYTES NFR BLD AUTO: 1 % (ref 0–2)
LDH SERPL-CCNC: 276 U/L (ref 140–271)
LYMPHOCYTES # BLD AUTO: 0.82 THOUSANDS/ÂΜL (ref 0.6–4.47)
LYMPHOCYTES NFR BLD AUTO: 8 % (ref 14–44)
MCH RBC QN AUTO: 31.6 PG (ref 26.8–34.3)
MCHC RBC AUTO-ENTMCNC: 32 G/DL (ref 31.4–37.4)
MCV RBC AUTO: 99 FL (ref 82–98)
MONOCYTES # BLD AUTO: 1.14 THOUSAND/ÂΜL (ref 0.17–1.22)
MONOCYTES NFR BLD AUTO: 12 % (ref 4–12)
NEUTROPHILS # BLD AUTO: 7.58 THOUSANDS/ÂΜL (ref 1.85–7.62)
NEUTS SEG NFR BLD AUTO: 76 % (ref 43–75)
NRBC BLD AUTO-RTO: 0 /100 WBCS
PHOSPHATE SERPL-MCNC: 2.3 MG/DL (ref 2.3–4.1)
PLATELET # BLD AUTO: 171 THOUSANDS/UL (ref 149–390)
PMV BLD AUTO: 10.1 FL (ref 8.9–12.7)
POTASSIUM SERPL-SCNC: 4 MMOL/L (ref 3.5–5.3)
PROT SERPL-MCNC: 5.9 G/DL (ref 6.4–8.4)
RBC # BLD AUTO: 3.61 MILLION/UL (ref 3.81–5.12)
SODIUM SERPL-SCNC: 141 MMOL/L (ref 135–147)
URATE SERPL-MCNC: 3.7 MG/DL (ref 2–7.5)
WBC # BLD AUTO: 9.94 THOUSAND/UL (ref 4.31–10.16)

## 2023-09-21 PROCEDURE — 85025 COMPLETE CBC W/AUTO DIFF WBC: CPT | Performed by: STUDENT IN AN ORGANIZED HEALTH CARE EDUCATION/TRAINING PROGRAM

## 2023-09-21 PROCEDURE — G1004 CDSM NDSC: HCPCS

## 2023-09-21 PROCEDURE — 86705 HEP B CORE ANTIBODY IGM: CPT | Performed by: STUDENT IN AN ORGANIZED HEALTH CARE EDUCATION/TRAINING PROGRAM

## 2023-09-21 PROCEDURE — 94150 VITAL CAPACITY TEST: CPT

## 2023-09-21 PROCEDURE — 94760 N-INVAS EAR/PLS OXIMETRY 1: CPT

## 2023-09-21 PROCEDURE — 86704 HEP B CORE ANTIBODY TOTAL: CPT | Performed by: STUDENT IN AN ORGANIZED HEALTH CARE EDUCATION/TRAINING PROGRAM

## 2023-09-21 PROCEDURE — 83615 LACTATE (LD) (LDH) ENZYME: CPT | Performed by: STUDENT IN AN ORGANIZED HEALTH CARE EDUCATION/TRAINING PROGRAM

## 2023-09-21 PROCEDURE — C1751 CATH, INF, PER/CENT/MIDLINE: HCPCS

## 2023-09-21 PROCEDURE — 80053 COMPREHEN METABOLIC PANEL: CPT | Performed by: STUDENT IN AN ORGANIZED HEALTH CARE EDUCATION/TRAINING PROGRAM

## 2023-09-21 PROCEDURE — 99233 SBSQ HOSP IP/OBS HIGH 50: CPT | Performed by: INTERNAL MEDICINE

## 2023-09-21 PROCEDURE — 86803 HEPATITIS C AB TEST: CPT | Performed by: STUDENT IN AN ORGANIZED HEALTH CARE EDUCATION/TRAINING PROGRAM

## 2023-09-21 PROCEDURE — 99233 SBSQ HOSP IP/OBS HIGH 50: CPT | Performed by: STUDENT IN AN ORGANIZED HEALTH CARE EDUCATION/TRAINING PROGRAM

## 2023-09-21 PROCEDURE — 94640 AIRWAY INHALATION TREATMENT: CPT

## 2023-09-21 PROCEDURE — 84100 ASSAY OF PHOSPHORUS: CPT | Performed by: STUDENT IN AN ORGANIZED HEALTH CARE EDUCATION/TRAINING PROGRAM

## 2023-09-21 PROCEDURE — 87389 HIV-1 AG W/HIV-1&-2 AB AG IA: CPT | Performed by: STUDENT IN AN ORGANIZED HEALTH CARE EDUCATION/TRAINING PROGRAM

## 2023-09-21 PROCEDURE — 87340 HEPATITIS B SURFACE AG IA: CPT | Performed by: STUDENT IN AN ORGANIZED HEALTH CARE EDUCATION/TRAINING PROGRAM

## 2023-09-21 PROCEDURE — 74177 CT ABD & PELVIS W/CONTRAST: CPT

## 2023-09-21 PROCEDURE — 97163 PT EVAL HIGH COMPLEX 45 MIN: CPT

## 2023-09-21 PROCEDURE — 76705 ECHO EXAM OF ABDOMEN: CPT

## 2023-09-21 PROCEDURE — 02HV33Z INSERTION OF INFUSION DEVICE INTO SUPERIOR VENA CAVA, PERCUTANEOUS APPROACH: ICD-10-PCS | Performed by: ANESTHESIOLOGY

## 2023-09-21 PROCEDURE — 97167 OT EVAL HIGH COMPLEX 60 MIN: CPT

## 2023-09-21 PROCEDURE — 84550 ASSAY OF BLOOD/URIC ACID: CPT | Performed by: STUDENT IN AN ORGANIZED HEALTH CARE EDUCATION/TRAINING PROGRAM

## 2023-09-21 RX ORDER — SULFAMETHOXAZOLE AND TRIMETHOPRIM 800; 160 MG/1; MG/1
1 TABLET ORAL 3 TIMES WEEKLY
Status: DISCONTINUED | OUTPATIENT
Start: 2023-09-22 | End: 2023-10-06

## 2023-09-21 RX ORDER — ACETAMINOPHEN 325 MG/1
650 TABLET ORAL ONCE
Status: CANCELLED | OUTPATIENT
Start: 2023-09-27

## 2023-09-21 RX ORDER — SODIUM CHLORIDE 9 MG/ML
20 INJECTION, SOLUTION INTRAVENOUS ONCE
Status: CANCELLED | OUTPATIENT
Start: 2023-09-27

## 2023-09-21 RX ORDER — PREDNISONE 50 MG/1
60 TABLET ORAL 2 TIMES DAILY WITH MEALS
Status: COMPLETED | OUTPATIENT
Start: 2023-09-22 | End: 2023-09-26

## 2023-09-21 RX ORDER — SODIUM CHLORIDE 9 MG/ML
20 INJECTION, SOLUTION INTRAVENOUS DAILY PRN
Status: DISPENSED | OUTPATIENT
Start: 2023-09-22 | End: 2023-09-27

## 2023-09-21 RX ORDER — LEVOFLOXACIN 250 MG/1
500 TABLET, FILM COATED ORAL
Status: DISCONTINUED | OUTPATIENT
Start: 2023-09-22 | End: 2023-10-06

## 2023-09-21 RX ORDER — HYDRALAZINE HYDROCHLORIDE 20 MG/ML
5 INJECTION INTRAMUSCULAR; INTRAVENOUS EVERY 6 HOURS PRN
Status: DISCONTINUED | OUTPATIENT
Start: 2023-09-21 | End: 2023-09-21

## 2023-09-21 RX ORDER — ACYCLOVIR 200 MG/1
400 CAPSULE ORAL 2 TIMES DAILY
Status: DISCONTINUED | OUTPATIENT
Start: 2023-09-22 | End: 2023-10-06

## 2023-09-21 RX ORDER — FAMOTIDINE 20 MG/1
20 TABLET, FILM COATED ORAL 2 TIMES DAILY
Status: DISCONTINUED | OUTPATIENT
Start: 2023-09-21 | End: 2023-12-04 | Stop reason: HOSPADM

## 2023-09-21 RX ORDER — HYDRALAZINE HYDROCHLORIDE 20 MG/ML
5 INJECTION INTRAMUSCULAR; INTRAVENOUS EVERY 6 HOURS PRN
Status: DISCONTINUED | OUTPATIENT
Start: 2023-09-21 | End: 2023-09-22

## 2023-09-21 RX ORDER — LISINOPRIL 10 MG/1
10 TABLET ORAL DAILY
Status: DISCONTINUED | OUTPATIENT
Start: 2023-09-21 | End: 2023-09-22

## 2023-09-21 RX ORDER — FLUCONAZOLE 100 MG/1
400 TABLET ORAL DAILY
Status: DISCONTINUED | OUTPATIENT
Start: 2023-09-22 | End: 2023-10-06

## 2023-09-21 RX ORDER — ALLOPURINOL 100 MG/1
300 TABLET ORAL DAILY
Status: DISCONTINUED | OUTPATIENT
Start: 2023-09-21 | End: 2023-10-06

## 2023-09-21 RX ORDER — ONDANSETRON 2 MG/ML
4 INJECTION INTRAMUSCULAR; INTRAVENOUS EVERY 8 HOURS PRN
Status: DISCONTINUED | OUTPATIENT
Start: 2023-09-22 | End: 2023-10-27

## 2023-09-21 RX ADMIN — LEVALBUTEROL HYDROCHLORIDE 1.25 MG: 1.25 SOLUTION RESPIRATORY (INHALATION) at 02:24

## 2023-09-21 RX ADMIN — AMLODIPINE BESYLATE 10 MG: 5 TABLET ORAL at 08:15

## 2023-09-21 RX ADMIN — NYSTATIN 500000 UNITS: 100000 SUSPENSION ORAL at 11:47

## 2023-09-21 RX ADMIN — OXYCODONE HYDROCHLORIDE 5 MG: 5 SOLUTION ORAL at 08:12

## 2023-09-21 RX ADMIN — IPRATROPIUM BROMIDE 0.5 MG: 0.5 SOLUTION RESPIRATORY (INHALATION) at 07:47

## 2023-09-21 RX ADMIN — CHLORHEXIDINE GLUCONATE 15 ML: 1.2 SOLUTION ORAL at 22:37

## 2023-09-21 RX ADMIN — LEVALBUTEROL HYDROCHLORIDE 1.25 MG: 1.25 SOLUTION RESPIRATORY (INHALATION) at 13:25

## 2023-09-21 RX ADMIN — LEVALBUTEROL HYDROCHLORIDE 1.25 MG: 1.25 SOLUTION RESPIRATORY (INHALATION) at 19:50

## 2023-09-21 RX ADMIN — IPRATROPIUM BROMIDE 0.5 MG: 0.5 SOLUTION RESPIRATORY (INHALATION) at 13:25

## 2023-09-21 RX ADMIN — LEVALBUTEROL HYDROCHLORIDE 1.25 MG: 1.25 SOLUTION RESPIRATORY (INHALATION) at 07:47

## 2023-09-21 RX ADMIN — IOHEXOL 100 ML: 350 INJECTION, SOLUTION INTRAVENOUS at 15:24

## 2023-09-21 RX ADMIN — HYDRALAZINE HYDROCHLORIDE 5 MG: 20 INJECTION, SOLUTION INTRAMUSCULAR; INTRAVENOUS at 17:22

## 2023-09-21 RX ADMIN — GABAPENTIN 300 MG: 300 CAPSULE ORAL at 16:33

## 2023-09-21 RX ADMIN — OXYCODONE HYDROCHLORIDE 5 MG: 5 TABLET ORAL at 05:50

## 2023-09-21 RX ADMIN — NYSTATIN 500000 UNITS: 100000 SUSPENSION ORAL at 08:15

## 2023-09-21 RX ADMIN — LISINOPRIL 10 MG: 10 TABLET ORAL at 11:30

## 2023-09-21 RX ADMIN — IPRATROPIUM BROMIDE 0.5 MG: 0.5 SOLUTION RESPIRATORY (INHALATION) at 02:24

## 2023-09-21 RX ADMIN — ENOXAPARIN SODIUM 40 MG: 40 INJECTION SUBCUTANEOUS at 08:15

## 2023-09-21 RX ADMIN — CARVEDILOL 25 MG: 12.5 TABLET, FILM COATED ORAL at 16:33

## 2023-09-21 RX ADMIN — GABAPENTIN 300 MG: 300 CAPSULE ORAL at 08:14

## 2023-09-21 RX ADMIN — FAMOTIDINE 20 MG: 10 INJECTION INTRAVENOUS at 08:15

## 2023-09-21 RX ADMIN — CARVEDILOL 25 MG: 12.5 TABLET, FILM COATED ORAL at 08:14

## 2023-09-21 RX ADMIN — BUSPIRONE HYDROCHLORIDE 30 MG: 10 TABLET ORAL at 16:31

## 2023-09-21 RX ADMIN — SERTRALINE HYDROCHLORIDE 50 MG: 50 TABLET ORAL at 08:15

## 2023-09-21 RX ADMIN — BUSPIRONE HYDROCHLORIDE 30 MG: 10 TABLET ORAL at 08:40

## 2023-09-21 RX ADMIN — IPRATROPIUM BROMIDE 0.5 MG: 0.5 SOLUTION RESPIRATORY (INHALATION) at 19:50

## 2023-09-21 RX ADMIN — OXYCODONE HYDROCHLORIDE 5 MG: 5 SOLUTION ORAL at 03:49

## 2023-09-21 RX ADMIN — GUAIFENESIN 200 MG: 200 SOLUTION ORAL at 08:14

## 2023-09-21 RX ADMIN — ALLOPURINOL 300 MG: 100 TABLET ORAL at 14:44

## 2023-09-21 RX ADMIN — OXYCODONE HYDROCHLORIDE 5 MG: 5 TABLET ORAL at 14:44

## 2023-09-21 RX ADMIN — CHLORHEXIDINE GLUCONATE 15 ML: 1.2 SOLUTION ORAL at 08:14

## 2023-09-21 RX ADMIN — GUAIFENESIN 200 MG: 200 SOLUTION ORAL at 03:15

## 2023-09-21 RX ADMIN — NYSTATIN 500000 UNITS: 100000 SUSPENSION ORAL at 22:36

## 2023-09-21 RX ADMIN — GUAIFENESIN 200 MG: 200 SOLUTION ORAL at 14:43

## 2023-09-21 RX ADMIN — NICOTINE 21 MG: 21 PATCH, EXTENDED RELEASE TRANSDERMAL at 08:13

## 2023-09-21 RX ADMIN — NYSTATIN 500000 UNITS: 100000 SUSPENSION ORAL at 18:33

## 2023-09-21 NOTE — SOCIAL WORK
LSW attempted to see patient/family today. Pt was unable to meet with this writer. LSW will continue to follow.

## 2023-09-21 NOTE — PROGRESS NOTES
[address] [facility]  Progress Note  Name: Renaldo Ba  MRN: [id number]  Unit/Bed#: ICU 06 I Date of Admission: 9/13/2023   Date of Service: 9/21/2023 I Hospital Day: 8    Assessment/Plan   Oropharyngeal mass  Assessment & Plan  · 9/14 Neck/ soft tissue CT: Large enhancing soft tissue mass identified within the left neck involving multiple spaces. This appears to be centered within the left oropharynx with extensions as described above into multiple spaces. This results in significant airway compromise. 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 Small level 3 and level 4 nodes are seen within the neck. · Tracheostomy by ENT, bx & addition testing performed  · Bx: Positive for malignancy, favor poorly differentiated carcinoma. Cannot entirely exclude a high grade large cell lymphoma. Portion of specimen submitted for flow cytometry. · H/o tobacco abuse, daily smoker. · Daughter was updated at bedside. · Preliminary biopsy: Large B-cell lymphoma, germinal center B-cell type, c-MYC and BCL-2 double expression. Plan:  · ENT/ Med onc following, appreciated  · Follow final biopsy report  · As needed vent per Trach. Weaning off    RML pneumonia  Assessment & Plan  · 9/13 CT PE study: Patchy consolidation right middle lobe, new from 8/25/2023, compatible with pneumonia. · No leukocytosis, no fever/chills. · Positive for sore throat, cough. · New onset pneumonia after recent hospitalization and antibiotics treatment. · 9/14: Bronchoscopy/ ABL. · MRSA negative. · ABL revealed 2+ Growth of Candida albicans, suspected contaminant. Plan:  · Zosyn x5 days completed. · Blood cultures neg. · Follow fever curve. * Acute respiratory failure with hypoxia secondary to oropharyngeal mass  Assessment & Plan  • POA with O2 saturate around 80% on room air. Needed high flow 60 L to bring up her O2 saturation to 95%.   • Recent hospitalization for respiratory failure second to pneumonia. • CTA PE study negative for PE. New onset RML PNA. • Persistent partial atelectasis of the lower lobes noted. • Current daily smoker. 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     Plan:  • Tracheostomy 9/17. • Completed Decadron. • DuoNeb. • Airway clearance protocol. IS.    PO (acute kidney injury) (HCC)-resolved as of 9/21/2023  Assessment & Plan  Lab Results   Component Value Date    CREATININE 0.76 09/20/2023    CREATININE 0.93 09/19/2023    CREATININE 0.98 09/18/2023       Likely prerenal.  Second to poor oral intake versus poor urine output. Baseline creatinine 1 to 1.2. Plan:  • Currently resolved  • Urinary retention protocol, Daily weights, I/O  • Monitor BMP daily and observe for downward trend of creatinine  • Avoid hypoperfusion of the kidneys, minimize nephrotoxins  • RAAS Blockers/Diuretics held: Lisinopril. (HFpEF) heart failure with preserved ejection fraction [facility])  Assessment & Plan  Wt Readings from Last 3 Encounters:   09/21/23 81.2 kg (179 lb 0.2 oz)   09/07/23 74.6 kg (164 lb 6.4 oz)   08/30/23 73.3 kg (161 lb 9.6 oz)     Lab Results   Component Value Date    LVEF 60 08/28/2023     (H) 09/13/2023     (H) 08/25/2023     • Volume status: Euvolemic. • POA physical (limited): JVD-, HJR-, B/L LE trace to 1+ edema. • Echo 8/28/23: LVEF 60%. D1DD.     Plan:  • CMP, magnesium; Goal Mg > 2 and K > 4; Replete prn  • HOB > 30°, Daily standing weights, Measure I/O    Angioedema  Assessment & Plan  • POA with acute respiratory failure, SOB. • [facility]: Swollen tongue, swelling soft and hard palate and almost the head of all phalanx is completely closed. Severe angioedema. • Decadron 10 mg, Benadryl 25 mg given. • Initially refused but eventually agreed with intubation. • Prior episode of angioedema in 2020 noted. Suspected coadministration of increase the dose of tramadol and lisinopril.   • Per daughter, there is no recent change in medications except Trelegy inhaler, cefdinir. 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 Plan:  • Trach placed 9/17    Ambulatory dysfunction  Assessment & Plan  PT/ OT eval before discharge. Pain syndrome, chronic  Assessment & Plan  · Home regimen: Oxycodone 5 mg twice daily as needed. Tylenol 650 mg every 8 hours as needed. Gabapentin 600 mg 3 times daily. Medical marijuana. · Per daughter, patient was overusing Tylenol over-the-counter. · Pain mostly from lumbar radiculopathy. · Impaired ambulatory dysfunction second to pain. Plan:  · Restarted gabapentin 300 3 times daily, scheduled oxycodone 5 mg 3 times daily, as needed Tylenol 650 mg every 6 hours. As needed fentanyl 50 mcg every 2 hours for severe pain. Hyperlipidemia  Assessment & Plan  Lab Results   Component Value Date    CHOLESTEROL 203 (H) 01/24/2023    CHOLESTEROL 177 08/11/2022    CHOLESTEROL 184 07/08/2020     Lab Results   Component Value Date    HDL 45 (L) 01/24/2023    HDL 37 (L) 08/11/2022    HDL 44 (L) 07/08/2020     Lab Results   Component Value Date    TRIG 166 (H) 09/16/2023    TRIG 160 (H) 01/24/2023    TRIG 108 08/11/2022     Lab Results   Component Value Date    NONHDLC 146 07/12/2018    [address] [facility] 207 (H) 04/29/2013     Continue outpatient diet/ lifestyle modification. Benign essential hypertension  Assessment & Plan  · Well controlled at home. · Home meds: Amlodipine 10 mg daily, Coreg 25 mg twice daily, lisinopril 10 mg daily. Plan:  · Currently on amlodipine 10 mg daily and Coreg 25 mg BID for BP controll. · PRN hydralazine if BP >160. · Avoid ACEI/ ARB even currently less likely cause of angioedema. COPD without exacerbation ([address])  Assessment & Plan  Continue vent with nebs.     Blister (nonthermal) of left forearm, sequela  Assessment & Plan  · 9/17/23: Blisters of LUE noted, no signs of infection  · Daily wound care, monitor signs of infection CKD (chronic kidney disease) stage 3, GFR 30-59 ml/min [facility])  Assessment & Plan  Lab Results   Component Value Date    EGFR 80 09/20/2023    EGFR 62 09/19/2023    EGFR 59 09/18/2023    CREATININE 0.76 09/20/2023    CREATININE 0.93 09/19/2023    CREATININE 0.98 09/18/2023     Stable. Encephalopathy-resolved as of 9/21/2023  Assessment & Plan  9/19- Pt appeared to be OAA x3. Improving. ICU Core Measures     Vented Patient  VAP Bundle  VAP bundle ordered     A: Assess, Prevent, and Manage Pain · Has pain been assessed? Yes  · Need for changes to pain regimen? No   B: Both Spontaneous Awakening Trials (SATs) and Spontaneous Breathing Trials (SBTs) · Plan to perform spontaneous awakening trial today? Yes   · Plan to perform spontaneous breathing trial today? Yes   · Obvious barriers to extubation? No   C: Choice of Sedation · RASS Goal: 0 Alert and Calm  · Need for changes to sedation or analgesia regimen? No   D: Delirium · CAM-ICU: Negative   E: Early Mobility  · Plan for early mobility? Yes   F: Family Engagement · Plan for family engagement today? Yes       Review of Invasive Devices:            Prophylaxis:  VTE VTE covered by:  enoxaparin, Subcutaneous, 40 mg at 09/20/23 5859       Stress Ulcer  covered byFamotidine (PF) (PEPCID) injection 20 mg [[id number]]       Subjective   HPI/24hr events: Nocturnal fever episode. Tmax 102.5F. Patient was seen and examined at bedside. She was OOA x3 and NAD. Still complaining of neck tracheal site tenderness. There was some blood staining, thick drainage around tracheostomy area. Again, she complained about left-sided headache. Her preliminary biopsy came back suggesting large B-cell lymphoma. Awaiting for FISH and NGS. MRI performed last night, awaiting for final reading. We will update ENT and Med Onc. As was, patient does not have CP, SOB, abdominal pain. No urinary symptoms. Last bowel movement yesterday.      Objective Vitals I/O      Most Recent Min/Max in 24hrs   Temp (!) 102.5 °F (39.2 °C) Temp  Min: 97.9 °F (36.6 °C)  Max: 102.5 °F (39.2 °C)   Pulse 91 Pulse  Min: 91  Max: 106   Resp (!) 26 Resp  Min: 13  Max: 42   BP (!) 172/84 BP  Min: 122/60  Max: 184/88   O2 Sat 97 % SpO2  Min: 90 %  Max: 100 %      Intake/Output Summary (Last 24 hours) at 9/21/2023 0644  Last data filed at 9/21/2023 0501  Gross per 24 hour   Intake 798 ml   Output 610 ml   Net 188 ml         Diet Enteral/Parenteral; Tube Feeding No Oral Diet; Vital AF 1.2; Continuous; 40; 100; Water; Every 6 hours     Invasive Monitoring Physical exam    Physical Exam  Eyes:      General: Vision grossly intact. No foreign body in eye        Right eye: No discharge. Left eye: Discharge (clear) present. Extraocular Movements: Extraocular movements intact. Conjunctiva/sclera: Conjunctivae normal.   Skin:     General: Skin is warm. Capillary Refill: Capillary refill takes less than 2 seconds. Coloration: Skin is not pale. Findings: Wound (Left forearm. Wrapped with clean gauze.) present. HENT:      Head: Normocephalic and atraumatic. Right Ear: Hearing normal. No drainage. Left Ear: Hearing normal. No drainage. Nose: Rhinorrhea and nasogastric tube present. No epistaxis. Mouth/Throat:      Mouth: Mucous membranes are moist. No injury. Neck:      Vascular: No JVD. Trachea: Tracheostomy present. Midline tenderness Cardiovascular:      Rate and Rhythm: Normal rate and regular rhythm. Pulses: Normal pulses. Heart sounds: Normal heart sounds. Musculoskeletal:         General: No swelling or tenderness. Normal range of motion. Right lower leg: No edema. Left lower leg: No edema. Abdominal:      General: There is no distension. Palpations: Abdomen is soft. Tenderness: There is abdominal tenderness (Epigastric). There is no guarding.    Constitutional:       General: She is not in acute distress. Interventions: Cervical collar in place. Pulmonary:      Effort: No accessory muscle usage, respiratory distress or accessory muscle usage. Breath sounds: Normal breath sounds. Chest:      Chest wall: No tenderness. Psychiatric:         Mood and Affect:  mood and affect abnormal.  Neurological:      General: No focal deficit present. Mental Status: She is alert and oriented to person, place and time. She is CAM ICU negative. Cranial Nerves: No facial asymmetry. Motor: Strength full and intact in all extremities. Genitourinary/Anorectal:  external catheter         Diagnostic Studies      EKG: No new EKG. Imaging: MRI neck and brain- pending read. I have personally reviewed pertinent reports.    and I have personally reviewed pertinent films in PACS     Medications:  Scheduled PRN   amLODIPine, 10 mg, Daily  busPIRone, 30 mg, TID  carvedilol, 25 mg, BID With Meals  chlorhexidine, 15 mL, Q12H ASHLEY  enoxaparin, 40 mg, Q24H ASHLEY  famotidine, 20 mg, Q24H ASHLEY  gabapentin, 300 mg, TID  guaiFENesin, 200 mg, Q6H  levalbuterol, 1.25 mg, Q6H   And  ipratropium, 0.5 mg, Q6H  nicotine, 21 mg, Daily  nystatin, 500,000 Units, 4x Daily  oxyCODONE, 5 mg, Q8H ASHLEY  senna, 8.8 mg, HS  sertraline, 50 mg, Daily      acetaminophen, 650 mg, Q6H PRN  fentanyl citrate (PF), 50 mcg, Q2H PRN  hydrALAZINE, 5 mg, Q6H PRN  levalbuterol, 1.25 mg, Q8H PRN  ondansetron, 4 mg, Q6H PRN  oxyCODONE, 2.5 mg, Q4H PRN   Or  oxyCODONE, 5 mg, Q4H PRN  polyethylene glycol, 17 g, Daily PRN       Continuous          Labs:    CBC    Recent Labs     09/19/23  0704 09/20/23 0527   WBC  --  11.58*   HGB 11.6 11.4*   HCT 34* 36.5   PLT  --  153     BMP    Recent Labs     09/19/23  0704 09/20/23 0527   SODIUM  --  142   K  --  3.9   CL  --  108   CO2 29 27   AGAP  --  7   BUN  --  23   CREATININE  --  0.76   CALCIUM  --  8.6       Coags    No recent results     Additional Electrolytes  Recent Labs     09/19/23  9136 09/20/23  0527   MG  --  2.1   PHOS  --  2.3   CAIONIZED 1.19  --           Blood Gas    No recent results  No recent results LFTs  Recent Labs     09/20/23  0527   ALT 14   AST 15   ALKPHOS 48   ALB 2.6*   TBILI 0.81       Infectious  No recent results  Glucose  Recent Labs     09/20/23  0527   GLUC 93               Yossi Quinn MD

## 2023-09-21 NOTE — NURSING NOTE
Nursing at bedside changing tracheostomy tube ryan when patient began coughing and removed her NG tube out of frustration. Tube feeds were stopped. Provider notified and dicussed placing another NG tube tomorrow AM for medications.

## 2023-09-21 NOTE — PROCEDURES
Insert Complex Venous Access Line    Date/Time: 9/21/2023 1:30 PM    Performed by: Dov Sampson RN  Authorized by: Sabina Acevedo MD    Patient location:  Bedside  Consent:     Consent obtained:  Verbal and written (obtained by md)    Consent given by:  Patient (obtained by md)    Procedural risks discussed: discussed with md.    Alternatives discussed: discussed with md.  Universal protocol:     Procedure explained and questions answered to patient or proxy's satisfaction: yes      Relevant documents present and verified: yes      Test results available and properly labeled: yes      Radiology Images displayed and confirmed. If images not available, report reviewed: yes      Required blood products, implants, devices, and special equipment available: yes      Site/side marked: yes      Immediately prior to procedure, a time out was called: yes      Patient identity confirmed:  Verbally with patient and arm band  Pre-procedure details:     Hand hygiene: Hand hygiene performed prior to insertion      Sterile barrier technique: All elements of maximal sterile technique followed      Skin preparation:  ChloraPrep    Skin preparation agent: Skin preparation agent completely dried prior to procedure    Indications:     PICC line indications: chemotherapy    Anesthesia (see MAR for exact dosages):      Anesthesia method:  Local infiltration    Local anesthetic:  Lidocaine 1% w/o epi (2ml used)  Procedure details:     Location:  Basilic    Vessel type: vein      Laterality:  Right    Approach: percutaneous technique used      Patient position:  Flat    Procedural supplies:  Triple lumen    Catheter size:  5 Fr    Landmarks identified: yes      Ultrasound guidance: yes      Ultrasound image availability:  Still images obtained    Sterile ultrasound techniques: Sterile gel and sterile probe covers were used      Number of attempts:  1    Successful placement: yes      Vessel of catheter tip end:  Sherlock 3CG confirmed    Total catheter length (cm):  45    Catheter out on skin (cm):  0    Max flow rate:  999ml/hr    Arm circumference:  36cm  Post-procedure details:     Post-procedure:  Dressing applied and securement device placed    Assessment:  Blood return through all ports    Post-procedure complications: none      Patient tolerance of procedure:   Tolerated well, no immediate complications  Comments:      3cg confirmed, RN aware, OK to use picc

## 2023-09-21 NOTE — ASSESSMENT & PLAN NOTE
Wt Readings from Last 3 Encounters:   09/21/23 81.2 kg (179 lb 0.2 oz)   09/07/23 74.6 kg (164 lb 6.4 oz)   08/30/23 73.3 kg (161 lb 9.6 oz)     Lab Results   Component Value Date    LVEF 60 08/28/2023     (H) 09/13/2023     (H) 08/25/2023     • Volume status: Euvolemic. • POA physical (limited): JVD-, HJR-, B/L LE trace to 1+ edema. • Echo 8/28/23: LVEF 60%.  D1DD.     Plan:  • CMP, magnesium; Goal Mg > 2 and K > 4; Replete prn  • HOB > 30°, Daily standing weights, Measure I/O

## 2023-09-21 NOTE — OCCUPATIONAL THERAPY NOTE
Occupational Therapy Evaluation     Patient Name: Ahmad Alpers  UMDKF'M Date: 2023  Problem List  Principal Problem:    Acute respiratory failure with hypoxia secondary to oropharyngeal mass  Active Problems:    Benign essential hypertension    Hyperlipidemia    Pain syndrome, chronic    CKD (chronic kidney disease) stage 3, GFR 30-59 ml/min (HCC)    (HFpEF) heart failure with preserved ejection fraction (HCC)    Ambulatory dysfunction    Angioedema    RML pneumonia    Oropharyngeal mass    Blister (nonthermal) of left forearm, sequela    COPD without exacerbation (HCC)    Large cell lymphoma ([address])    Chemotherapy induced neutropenia ([address])    Past Medical History  Past Medical History:   Diagnosis Date    Anxiety     Depression     Fatty liver     Hyperlipidemia     Hypertension     Psychiatric disorder     Varicella      Past Surgical History  Past Surgical History:   Procedure Laterality Date    BREAST SURGERY Bilateral     Reduction Procedure    CARDIAC SURGERY       SECTION      x4    DILATION AND CURETTAGE OF UTERUS      ECTOPIC PREGNANCY SURGERY       [address] INCL FLUOR GDNCE DX W/CELL WASHG 44 [facility] N/A 2023    Procedure: BRONCHOSCOPY FLEXIBLE;  Surgeon: Dave Weston MD;  [facility];  Service: ENT    TRACHEOSTOMY N/A 2023    Procedure: TRACHEOSTOMY, biopsy of nasopharynx mass;  Surgeon: Dave Weston MD;  [facility];  Service: ENT             23 1059   OT Last Visit   OT Visit Date 23   Note Type   Note type Evaluation   Pain Assessment   Pain Assessment Tool 0-10   Pain Score No Pain   Restrictions/Precautions   Weight Bearing Precautions Per Order No   Other Precautions Chair Alarm; Bed Alarm;Multiple lines;O2;Fall Risk;Pain  (trach + trach collar, NGT + tube feeds)   Home Living   Type of [address] [place] [address] Two level;Stairs to enter without rails; Performs ADLs on one level  (3 PAUL, FFSU)   Bathroom Shower/Tub Tub/shower unit   [employer] Standard   Bathroom Equipment Shower chair;Commode   Bathroom Accessibility Accessible   Home Equipment [place] cane; Wheelchair-manual   Additional Comments use of quad cane vs RW at baseline with mod I   Prior Function   Level of [place] Needs assistance with ADLs   Lives With Daughter   Receives Help From Family  (36 hrs/wk of HHA; evenings and weekends (pt home alone during daytime))   IADLs Family/Friend/Other provides transportation; Family/Friend/Other provides meals; Family/Friend/Other provides medication management   Falls in the last 6 months 1 to 4  (2)   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 Found to have  L naso/oropharyngeal mass. Pt had trach placement on 9/17   Family/Caregiver Present No   Subjective   Subjective "I'm just so tired and I want to go home" Pt mouthing words during evaluation due to trach   ADL   Eating Assistance Unable to assess   Eating Deficit NPO   Grooming Assistance 5  Supervision/Setup   UB [address] 3  Moderate [address] Deficit Increased time to complete;Supervision/safety;Verbal cueing; Don/doff R sock; Don/doff L sock   Toileting Assistance  2  Maximal Assistance   Toileting Deficit Perineal hygiene  (incontinent of bowel)   Additional Comments Did not observe eating, UB bathing, LB bathing and UB dressing at time of evaluation, with use of clinical reasoning, pt's performance throughout evaluation indicates that pt may be able to perform these tasks at the levels listed above   Transfers   Sit to Stand 4  Minimal assistance   Additional items Assist x 1; Increased time required;Verbal cues   Stand to Sit 4  Minimal assistance   Additional items Assist x 1; Increased time required;Verbal cues   Additional Comments x2 trials of standing 1 min each with min A x1 + use of RW   Functional Mobility   Additional Comments unable to advance due to fatigue   Balance   Static Sitting Fair +   Dynamic Sitting Fair   Static Standing Poor +   Dynamic Standing Poor +   Activity Tolerance   Activity Tolerance Patient limited by fatigue   Medical Staff Made Aware PT Satinder Mercado, SPT Leda Leggett   RUE Assessment   RUE Assessment WFL   LUE Assessment   LUE Assessment WFL   Hand Function   Gross Motor Coordination Functional   Fine Motor Coordination Functional   Cognition   Overall Cognitive Status WFL   Arousal/Participation Alert; Cooperative   Attention Within functional limits   Orientation Level Oriented X4   Memory Within functional limits   Following Commands Follows one step commands without difficulty   Comments pleasant and cooperative, notably depressed and frustrated with situation   Assessment   Limitation Decreased ADL status; Decreased Safe judgement during ADL;Decreased cognition;Decreased endurance;Decreased self-care trans;Decreased high-level ADLs  (impaired balance, fxnl mobility, act mike, trunk control, standing mike, strength, pacing, problem solving)   Prognosis Good   Assessment Pt is a 69 y.o. female seen for OT evaluation s/p admission to [facility]/13/2023 due to SOB. Diagnosed with Acute respiratory failure with hypoxia ([address]).  Personal and env factors supporting pt at time of IE include supportive daughter to assist as needed with ADLS + IADLs, accessible home environment and FFSU. Personal and env factors inhibiting engagement in occupations include limited social support (daughter is working during the day). Performance deficits that affect the pt’s occupational performance can be seen above. Due to pt's current functional limitations and medical complications pt is functioning below baseline. Pt would benefit from continued skilled OT treatment in order to maximize safety, independence and overall performance with ADLs, functional mobility and functional transfers in order to achieve highest level of function. Goals   Patient Goals to get back to bed   LTG Time Frame 10-14   Long Term Goal see goals listed below   Plan   Treatment Interventions ADL retraining;Functional transfer training; Endurance training;Patient/family training;Equipment evaluation/education; Compensatory technique education; Activityengagement; Energy conservation   Goal Expiration Date 10/01/23   OT Treatment Day 0   OT Frequency 3-5x/wk   Recommendation   OT Discharge Recommendation Post acute rehabilitation services   AM-PAC Daily Activity Inpatient   Lower Body Dressing 3   Bathing 3   Toileting 2   Upper Body Dressing 3   Grooming 3   Eating 1  (currently NPO: presume 3 when able to eat)   Daily Activity Raw Score 15   Daily Activity Standardized Score (Calc for Raw Score >=11) 34.69   AM-PAC Applied Cognition Inpatient   Following a Speech/Presentation 4   Understanding Ordinary Conversation 4   Taking Medications 3   Remembering Where Things Are Placed or Put Away 3   Remembering List of 4-5 Errands 3   Taking Care of Complicated Tasks 3   Applied Cognition Raw Score 20   Applied Cognition Standardized Score 41.76   End of Consult   Patient Position at End of Consult Bedside chair; All needs within reach;Bed/Chair alarm activated       GOALS:   Goals established in order to promote pt's established goal of going home     -Patient will perform grooming tasks standing at sink with overall Mod I in order to increase overall independence    -Patient will be Mod I with UB dressing using AE and AD as needed in order to increase (I) with ADLs    -Patient will be Mod I with UB bathing using AE and AD as needed in order to increase (I) with ADLs    -Patient will be Mod I with LB dressing with use of AE and AD as needed in order to increase (I) with ADLs    -Patient will be Mod I with LB bathing with use of AE and AD as needed in order to increase (I) with ADLs    -Patient will complete toileting w/ Mod I w/ G hygiene/thoroughness in order to reduce caregiver burden    -Patient will demonstrate Mod I with bed mobility for ability to manage own comfort and initiate OOB tasks.     -Patient will perform functional transfers with Mod I to/from all surfaces using DME as needed in order to increase (I) with functional tasks    -Patient will be Mod I with functional mobility to/from bathroom for increased independence with toileting tasks    -Patient will independently integrate one pacing strategy into morning ADL's.    -Patient will demonstrate standing for 3 min in order to increase active participation in functional activities          The patient's raw score on the -PAC Daily Activity Inpatient Short Form is 15. A raw score of less than 19 suggests the patient may benefit from discharge to post-acute rehabilitation services. Please refer to the recommendation of the Occupational Therapist for safe discharge planning. This session, pt required and most appropriately benefited from skilled OT/PT co-eval due to significant regression from functional baseline and decreased activity tolerance. OT and PT goals were addressed separately as seen in documentation.      Ovidio Hanson MS, OTR/L

## 2023-09-21 NOTE — ASSESSMENT & PLAN NOTE
· Home regimen: Oxycodone 5 mg twice daily as needed. Tylenol 650 mg every 8 hours as needed. Gabapentin 600 mg 3 times daily. Medical marijuana. · Per daughter, patient was overusing Tylenol over-the-counter. · Pain mostly from lumbar radiculopathy. · Impaired ambulatory dysfunction second to pain. Plan:  · Restarted gabapentin 300 3 times daily, scheduled oxycodone 5 mg 3 times daily, as needed Tylenol 650 mg every 6 hours. As needed fentanyl 50 mcg every 2 hours for severe pain.

## 2023-09-21 NOTE — PLAN OF CARE
Problem: OCCUPATIONAL THERAPY ADULT  Goal: Performs self-care activities at highest level of function for planned discharge setting. See evaluation for individualized goals. Description: Treatment Interventions: ADL retraining, Functional transfer training, Endurance training, Patient/family training, Equipment evaluation/education, Compensatory technique education, Activityengagement, Energy conservation          See flowsheet documentation for full assessment, interventions and recommendations. Note: Limitation: Decreased ADL status, Decreased Safe judgement during ADL, Decreased cognition, Decreased endurance, Decreased self-care trans, Decreased high-level ADLs (impaired balance, fxnl mobility, act mike, trunk control, standing mike, strength, pacing, problem solving)  Prognosis: Good  Assessment: Pt is a 69 y.o. female seen for OT evaluation s/p admission [facility] on 9/13/2023 due to SOB. Diagnosed with Acute respiratory failure with hypoxia ([address]). Personal and env factors supporting pt at time of IE include supportive daughter to assist as needed with ADLS + IADLs, accessible home environment and FFSU. Personal and env factors inhibiting engagement in occupations include limited social support (daughter is working during the day). Performance deficits that affect the pt’s occupational performance can be seen above. Due to pt's current functional limitations and medical complications pt is functioning below baseline. Pt would benefit from continued skilled OT treatment in order to maximize safety, independence and overall performance with ADLs, functional mobility and functional transfers in order to achieve highest level of function.      OT Discharge Recommendation: Post acute rehabilitation services

## 2023-09-21 NOTE — ASSESSMENT & PLAN NOTE
· 9/14 Neck/ soft tissue CT: Large enhancing soft tissue mass identified within the left neck involving multiple spaces. This appears to be centered within the left oropharynx with extensions as described above into multiple spaces. This results in significant airway compromise. 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 Small level 3 and level 4 nodes are seen within the neck. · Tracheostomy by ENT, bx & addition testing performed  · Bx: Positive for malignancy, favor poorly differentiated carcinoma. Cannot entirely exclude a high grade large cell lymphoma. Portion of specimen submitted for flow cytometry. · H/o tobacco abuse, daily smoker. · Daughter was updated at bedside. · Preliminary biopsy: Large B-cell lymphoma, germinal center B-cell type, c-MYC and BCL-2 double expression. Plan:  · ENT/ Med onc following, appreciated  · Follow final biopsy report  · As needed vent per Trach.  Weaning off

## 2023-09-21 NOTE — PHYSICAL THERAPY NOTE
PHYSICAL THERAPY EVALUATION  DATE: 23  TIME: 3223-1981    NAME:  Diann Duron  AGE:   69 y.o.  Mrn:   [id number]  Length Of Stay: 8    ADMIT DX:  Angioedema, initial encounter Ronaldo Duran. 3XXA]    Past Medical History:   Diagnosis Date    Anxiety     Depression     Fatty liver     Hyperlipidemia     Hypertension     Psychiatric disorder     Varicella      Past Surgical History:   Procedure Laterality Date    BREAST SURGERY Bilateral     Reduction Procedure    CARDIAC SURGERY       SECTION      x4    DILATION AND CURETTAGE OF UTERUS      ECTOPIC PREGNANCY SURGERY       [address] INCL FLUOR GDNCE DX W/CELL WASHG 44 [facility] N/A 2023    Procedure: BRONCHOSCOPY FLEXIBLE;  Surgeon: Jose Mckeon MD;  [facility];  Service: ENT    TRACHEOSTOMY N/A 2023    Procedure: TRACHEOSTOMY, biopsy of nasopharynx mass;  Surgeon: Jose Mckeon MD;  [facility];  Service: ENT       Performed at least 2 patient identifiers during session: Name, ID bracelet, and Epic photo       23 1034   PT Last Visit   PT Visit Date 23   Note Type   Note type Evaluation   Pain Assessment   Pain Assessment Tool 0-10   Pain Score No Pain   Restrictions/Precautions   Weight Bearing Precautions Per Order No   Other Precautions Chair Alarm; Bed Alarm;Multiple lines;Telemetry;O2;Fall Risk;Aspiration  (+trach w/ trach collar, +NGT w/ tube feeds, +telemetry)   Home Living   Type of [address] [place] [address] Two level;Stairs to enter without rails; Performs ADLs on one level; Able to live on main level with bedroom/bathroom  (3 PAUL, maintains FF)   Bathroom Shower/Tub Tub/shower unit   Bathroom Toilet Standard   Bathroom Equipment Shower chair;Commode   Home Equipment Walker;Life alert;Quad cane; Wheelchair-manual   Prior Function   Level of [place] Needs assistance with ADLs; Independent with IADLS; Independent with functional mobility   Lives With Daughter   Receives Help From Family  (40 hrs/wk of HHA; evenings and weekends (pt home alone during daytime))   IADLs Family/Friend/Other provides transportation; Family/Friend/Other provides meals; Family/Friend/Other provides medication management   Falls in the last 6 months 1 to 4  (pt reports 2 falls)   Vocational Retired  (retired [employer] )   Comments Per chart review and pt report, pt amb with TERRA using quad cane or RW, depending on the day. Has HHA 40hrs/wk during evening and weekends (dtr is paid CG) however is home alone during daytimes. General   Additional Pertinent History Pt admitted on 9/13/2023 with c/o of SOB. Exam findings show airway obstructive mass, newly dx lymphoma. PMH is significant for HTN, CKD, HFpEF, PE, and anx/dep.    Family/Caregiver Present No   Cognition   Overall Cognitive Status WFL   Arousal/Participation Alert   Orientation Level Oriented to person;Oriented to place;Oriented to situation   Memory Within functional limits   Following Commands Follows one step commands with increased time or repetition   Subjective   Subjective "I am tired"   RUE Assessment   RUE Assessment WFL  (observed grossly during functional mobility)   LUE Assessment   LUE Assessment WFL  (observed grossly during functional mobility)   RLE Assessment   RLE Assessment X   Strength RLE   R Hip Flexion 3+/5   R Knee Flexion 4-/5   R Knee Extension 4/5   R Ankle Dorsiflexion 4/5   LLE Assessment   LLE Assessment X   Strength LLE   L Hip Flexion 3+/5   L Knee Extension 4-/5   L Ankle Dorsiflexion 4/5   L Knee Flexion 4-/5   Coordination   Movements are Fluid and Coordinated 1   Sensation WFL   Light Touch   RLE Light Touch Grossly intact   LLE Light Touch Grossly intact   Proprioception   RLE Proprioception Grossly intact   LLE Proprioception Grossly Intact   Bed Mobility   Additional Comments NT as pt was found and left in OOB recliner chair following session   Transfers   Sit to Stand 4  Minimal assistance   Additional items Assist x 1;Armrests; Increased time required;Verbal cues   Stand to Sit 4  Minimal assistance   Additional items Assist x 1; Armrests; Increased time required;Verbal cues   Additional Comments Pt displayed impaired standing tolerance; tolerated standing x 1 min + x 30 sec with continued MINAx1 & RW support. Ambulation/Elevation   Gait pattern Not tested; Not appropriate  (pt declines attempt at ambulation due to fatigue)   Balance   Static Sitting Fair +   Dynamic Sitting Fair   Static Standing Poor +  (w RW)   Dynamic Standing Poor +  (w RW)   Endurance Deficit   Endurance Deficit Yes   Endurance Deficit Description Pt reports being fatigued after minimal functional mobility efforts   Activity Tolerance   Activity Tolerance Patient limited by fatigue   Medical Staff Made Aware Spoke to  [address] to TINO Rousseau   Assessment   Prognosis Fair   Problem List Decreased strength;Decreased endurance; Impaired balance;Decreased mobility; Decreased skin integrity;Obesity   Assessment Pt is a 69 y.o. female  that presents 9/13/2023 with c/o of SOB x3wks. Exam findings show airway obstructive mass, newly dx lymphoma, dx Acute respiratory failure with hypoxia ([address]). PMH is significant for HTN, CKD, HFpEF, PE, and anx/dep. Pt is a fair candidate for skilled PT services with limiting factors of weakness, decreased skin integrity, decreased endurance, decreased functional mobility tolerance and extensive PMH, with new dx of lymphoma. During initial evaluation, pt required MINAx1 for transfers with use of armrests, increased time, and frequent verbal cueing for proper hand placement. Pt displayed limited standing tolerance x 1min + x 30sec during evaluation. Pt required continued MINAx1 to maintain upright position with use of RW for increased support. Pt reports high levels of fatigue and declines participation in pre-gait and gait activities.  Pt presents below their baseline level of functional mobility and will benefit from continued PT services during admission to address found deficits. Pt's clinical presentation is currently unstable/unpredictable as seen in pt's presentation of increased fall risk, new onset of impairment of functional mobility and decreased endurance, and new medical diagnostic and intervention. At this time, PT is recommending post acute rehab services upon d/c to address functional and mobility deficits. Next session, plan for bed mobility, transfer training, and gait training as tolerated. Barriers to Discharge Inaccessible home environment;Decreased caregiver support  (Pt is at home alone during the day; + PAUL her home)   Goals   Patient Goals "get back to bed"   [facility] Expiration Date 10/01/23   Short Term Goal #1 Pt will be able to consistently complete bed mobility with S in order to promote independence and mobility. Pt will be able to consistently complete all transfers with S in order to increase function and decrease burden of care. Pt will be able to ambulate >25 ft with S and use of LRAD to mimic household distances and increase pt function. Pt will be able to ambulate 100 ft with no greater than MINAx1 and use of RW to mimic limited community distances and increase pt mobility potential.  Pt will be able to navigate 3 stairs with no greater than MODAx1 and use of LRAD as appropriate in order to mimic home environment and increase pt independence. Pt will improve LE strength to >/= 4/5 in order to increase strength and functional potential.  Pt will improve AM-PAC score to 19/24 in order to increase function and independence. Pt will improve Barthel score to 45/100 in order to increase independence with ADLs. PT Treatment Day 0   Plan   Treatment/Interventions Functional transfer training;LE strengthening/ROM; Elevations; Therapeutic exercise; Endurance training;Patient/family training;Equipment eval/education; Bed mobility;Gait training;Spoke to nursing;Spoke to case management   PT Frequency 3-5x/wk   Recommendation   PT Discharge Recommendation Post acute rehabilitation services   AM-PAC Basic Mobility Inpatient   Turning in Flat Bed Without Bedrails 3   Lying on Back to Sitting on Edge of Flat Bed Without Bedrails 2   Moving Bed to Chair 3   Standing Up From Chair Using Arms 3   Walk in Room 2   Climb 3-5 Stairs With Railing 1   Basic Mobility Inpatient Raw Score 14   Basic Mobility Standardized Score 35.55   Highest Level Of Mobility   -[facility] Goal 4: Move to chair/commode   -[facility] Achieved 5: Stand (1 or more minutes)   Barthel Index   Feeding 5   Bathing 0   Grooming Score 0   Dressing Score 5   Bladder Score 5   Bowels Score 5   Toilet Use Score 5   Transfers (Bed/Chair) Score 10   Mobility (Level Surface) Score 0   Stairs Score 0   Barthel Index Score 35   End of Consult   Patient Position at End of Consult Bedside chair;Bed/Chair alarm activated; All needs within reach     The patient's AM-PAC Basic Mobility Inpatient Short Form Raw Score is 14. A Raw score of less than or equal to 16 suggests the patient may benefit from discharge to post-acute rehabilitation services. Please also refer to the recommendation of the Physical Therapist for safe discharge planning.     Mariluz Puga, SPT  09/21/23

## 2023-09-21 NOTE — ASSESSMENT & PLAN NOTE
Lab Results   Component Value Date    CHOLESTEROL 203 (H) 01/24/2023    CHOLESTEROL 177 08/11/2022    CHOLESTEROL 184 07/08/2020     Lab Results   Component Value Date    HDL 45 (L) 01/24/2023    HDL 37 (L) 08/11/2022    HDL 44 (L) 07/08/2020     Lab Results   Component Value Date    TRIG 166 (H) 09/16/2023    TRIG 160 (H) 01/24/2023    TRIG 108 08/11/2022     Lab Results   Component Value Date    NONHDLC 146 07/12/2018    [address] [facility] 207 (H) 04/29/2013     Continue outpatient diet/ lifestyle modification.

## 2023-09-21 NOTE — PLAN OF CARE
Problem: PHYSICAL THERAPY ADULT  Goal: Performs mobility at highest level of function for planned discharge setting. See evaluation for individualized goals. Description: Treatment/Interventions: Functional transfer training, LE strengthening/ROM, Elevations, Therapeutic exercise, Endurance training, Patient/family training, Equipment eval/education, Bed mobility, Gait training, Spoke to nursing, Spoke to case management          See flowsheet documentation for full assessment, interventions and recommendations. Note: Prognosis: Fair  Problem List: Decreased strength, Decreased endurance, Impaired balance, Decreased mobility, Decreased skin integrity, Obesity  Assessment: Pt is a 69 y.o. female  that presents 9/13/2023 with c/o of SOB x3wks. Exam findings show airway obstructive mass, newly dx lymphoma, dx Acute respiratory failure with hypoxia ([address]). PMH is significant for HTN, CKD, HFpEF, PE, and anx/dep. Pt is a fair candidate for skilled PT services with limiting factors of weakness, decreased skin integrity, decreased endurance, decreased functional mobility tolerance and extensive PMH, with new dx of lymphoma. During initial evaluation, pt required MINAx1 for transfers with use of armrests, increased time, and frequent verbal cueing for proper hand placement. Pt displayed limited standing tolerance x 1min + x 30sec during evaluation. Pt required continued MINAx1 to maintain upright position with use of RW for increased support. Pt reports high levels of fatigue and declines participation in pre-gait and gait activities. Pt presents below their baseline level of functional mobility and will benefit from continued PT services during admission to address found deficits. Pt's clinical presentation is currently unstable/unpredictable as seen in pt's presentation of increased fall risk, new onset of impairment of functional mobility and decreased endurance, and new medical diagnostic and intervention.  At this time, PT is recommending post acute rehab services upon d/c to address functional and mobility deficits. Next session, plan for bed mobility, transfer training, and gait training as tolerated. Barriers to Discharge: Inaccessible home environment, Decreased caregiver support (Pt is at home alone during the day; + PAUL her home)     PT Discharge Recommendation: Post acute rehabilitation services    See flowsheet documentation for full assessment.      Lisa Montes, SPT  09/21/23

## 2023-09-21 NOTE — ASSESSMENT & PLAN NOTE
• POA with acute respiratory failure, SOB. • [facility]: Swollen tongue, swelling soft and hard palate and almost the head of all phalanx is completely closed. Severe angioedema. • Decadron 10 mg, Benadryl 25 mg given. • Initially refused but eventually agreed with intubation. • Prior episode of angioedema in 2020 noted. Suspected coadministration of increase the dose of tramadol and lisinopril. • Per daughter, there is no recent change in medications except Trelegy inhaler, cefdinir. 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       Plan:  • Trach placed 9/17

## 2023-09-21 NOTE — CASE MANAGEMENT
Case Management Progress Note    Patient name Melissa Esquivel  Location ICU 06/ICU 06 MRN [id number]  : 1955 Date 2023       LOS (days): 8  Geometric Mean LOS (GMLOS) (days): 24.20  Days to GMLOS:16.3        OBJECTIVE:        Current admission status: Inpatient  Preferred Pharmacy:   [id number] [place] - [address]. [address] [place] [address]  Phone: [phone] Fax: [phone] [facility] [facility] [address] [address]  Phone: [phone] Fax: [phone]    Primary Care Provider: Charity Plascencia MD    Primary Insurance: [employer]   Secondary Insurance: [facility]    PROGRESS NOTE:    CM met with pt and introduced self/role with dcp. CM reviewed with pt conversation with her daughter, Izabel Romero, regarding dcp such as rehab vs return home with VNA and increase waiver hours/assistance during the day. Pt aware that CM was able to speak with Claudia Alert her  who was planning to contact her daughter yesterday. At this time pt denies questions. Aware CM will f/u with recommendations/referrals.

## 2023-09-21 NOTE — PROGRESS NOTES
Medical Oncology/Hematology Progress Note  Keven Dwyer, female, 69 y.o., 1955,  ICU 06/ICU 06, [id number]     Assessment and Plan  1. 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 Left Jugular Lymphadenopathy       · IMAGIN/14 CT Soft Tissue Neck w contrast: soft tissue mass ~ 6.1 x 4.7 x 5.2 cm appears to be centered within Lt oropharynx involving multiple spaces and extends into the nasopharynx. .. multiple small jugular chain nodes on the left. · MRI Brain (23) No evidence of brain metastases. · MRI soft tissue Neck- pending final reports. · PATHOLOGY 23: Large B-cell lymphoma, germinal center B-cell type, c-MYC and BCL-2 double expression. Ki-67 proliferation index is up to 90%; FISH & NGS pending. · Performance Status: ECOG 1-2 ; was full independent up to ~ 1 week prior to this admission when presented with the neck mass and respiratory distress. · GOALS of CARE: preformed since admit and on , options, benefits and risk of treatment which could be life threatening, toxicities and side effects, patient as well as family so far confirm "want fight the cancer" and ask for "full treatment" with understanding and accepting the risks. · Afebrile on no pressors; Blood Cultures  no growth x 5 days ; no evidence of acute/active infection. · Trach in place ; at [facility]    PLAN   · Will check LD, HIV, Hepatitis Panel, Uric Acid, CMP, and Phosphorus  · ECHO 23 with EF 60%, and Grade I Diastolic CHF  · At least stage II lymphoma; obtaining CT Abdomen Pelvis for staging   · NCCN guidelines reviewed; potential plan to start Dose Adjusted EPOCH-R in house , arrangements, official consent, and further planning in place.      Outpatient follow up plan: Dr. Serjio Brewer 10/03      Communication with patient/family:  I did update the patient, as well as her daughter Maranda Judd on the phone today morning ; ongoing discussion      Communication with team:  Did communicate with CC team. I did review this patient with Dr. Katalina Henry and they are in agreement with this plan. Subjective:    Review of Systems  Very limited due to limited talk/ Trach in place with the obstructive oropharyngeal mass ; she denies having pain, and she noted "want fight the cancer" ; spoke with daughter on the phone this morning , above A&P explained in details, including lymphoma diagnosis, options of management and she confirms again the agreement for treatment and that her mother (the patient ) confirmed to them she wants treatment and they understand the risks , possible toxicities and complications of the chemotherapies , tumor lysis syndrome, etc. Ongoing discussion and planning to follow with official consent today for treatment as above.       Objective:     Medication Administration - last 24 hours from 09/20/2023 0821 to 09/21/2023 1228       Date/Time Order Dose Route Action Action by     09/21/2023 0814 EDT chlorhexidine (PERIDEX) 0.12 % oral rinse 15 mL 15 mL Mouth/Throat Given Brandon Huston RN     09/20/2023 2154 EDT chlorhexidine (PERIDEX) 0.12 % oral rinse 15 mL 15 mL Mouth/Throat Given Brandi Virk RN     09/20/2023 1252 EDT chlorhexidine (PERIDEX) 0.12 % oral rinse 15 mL 15 mL Mouth/Throat Given Brandon Huston RN     09/21/2023 0815 EDT Famotidine (PF) (PEPCID) injection 20 mg 20 mg Intravenous Given [place] TINO Huston     09/20/2023 0915 EDT Famotidine (PF) (PEPCID) injection 20 mg 20 mg Intravenous Given [place] Huston RN     09/20/2023 2154 EDT busPIRone (BUSPAR) tablet 30 mg 30 mg Oral Given Brandi Virk RN     09/20/2023 1731 EDT busPIRone (BUSPAR) tablet 30 mg 30 mg Oral Given Chiquita Camacho, TINO     09/20/2023 0925 EDT busPIRone (BUSPAR) tablet 30 mg 30 mg Oral Given [place] Huston, TINO     09/21/2023 0815 EDT sertraline (ZOLOFT) tablet 50 mg 50 mg Oral Given [place] Robel, TINO     09/20/2023 3881 EDT sertraline (ZOLOFT) tablet 50 mg 50 mg Oral Given [address]     09/21/2023 1765 EDT nystatin (MYCOSTATIN) oral suspension 500,000 Units 500,000 Units Swish & Swallow Given Josemanuel Edgar, TINO     09/20/2023 2153 EDT nystatin (MYCOSTATIN) oral suspension 500,000 Units 500,000 Units Swish & Swallow Given [place] Kemp, RN     09/20/2023 1731 EDT nystatin (MYCOSTATIN) oral suspension 500,000 Units 500,000 Units Swish & Swallow Given Chiquita Camacho, RN     09/20/2023 1248 EDT nystatin (MYCOSTATIN) oral suspension 500,000 Units 500,000 Units Swish & Swallow Given Josemanuel Edgar, RN     09/20/2023 3783 EDT nystatin (MYCOSTATIN) oral suspension 500,000 Units 500,000 Units Swish & Swallow Given Josemanuel Edgar, TINO     09/21/2023 8302 EDT levalbuterol Cordell Peres) inhalation solution 1.25 mg 1.25 mg Nebulization Given Hermelindo Herrera, RT     09/21/2023 0224 EDT levalbuterol (Cordell Peres) inhalation solution 1.25 mg 1.25 mg Nebulization Given Jazlyn Wandaposelli, RT     09/20/2023 2137 EDT levalbuterol (Verlee Peres) inhalation solution 1.25 mg 1.25 mg Nebulization Given Jazlyn Wandaposelli, RT     09/20/2023 1423 EDT levalbuterol (Verlee Peres) inhalation solution 1.25 mg 1.25 mg Nebulization Given Violette Anastasia, RT     09/21/2023 0747 EDT ipratropium (ATROVENT) 0.02 % inhalation solution 0.5 mg 0.5 mg Nebulization Given Hermelindo [place], RT     09/21/2023 0224 EDT ipratropium (ATROVENT) 0.02 % inhalation solution 0.5 mg 0.5 mg Nebulization Given Jazlyn Rapposelli, RT     09/20/2023 2138 EDT ipratropium (ATROVENT) 0.02 % inhalation solution 0.5 mg 0.5 mg Nebulization Given Jazlyn Rapposelli, RT     09/20/2023 1423 EDT ipratropium (ATROVENT) 0.02 % inhalation solution 0.5 mg 0.5 mg Nebulization Given Violette Anastasia, RT     09/21/2023 8781 EDT gabapentin (NEURONTIN) capsule 300 mg 300 mg Oral Given Josemanuel Edgar, RN     09/20/2023 2154 EDT gabapentin (NEURONTIN) capsule 300 mg 300 mg Oral Given Jono Kemp, RN     09/20/2023 1730 EDT gabapentin (NEURONTIN) capsule 300 mg 300 mg Oral Given Laurel Fang, TINO     09/20/2023 8340 EDT gabapentin (NEURONTIN) capsule 300 mg 300 mg Oral Given Rafaela [facility], RN     09/21/2023 2722 EDT oxyCODONE (ROXICODONE) IR tablet 5 mg 5 mg [facility]     09/20/2023 2154 EDT oxyCODONE (ROXICODONE) IR tablet 5 mg 5 mg Oral [facility]     09/20/2023 1323 EDT oxyCODONE (ROXICODONE) IR tablet 5 mg 5 mg Oral Given [facility], RN     09/21/2023 0813 EDT nicotine (NICODERM CQ) 21 mg/24 hr TD 24 hr patch 21 mg 21 mg Transdermal Medication Applied [facility], RN     09/21/2023 8679 EDT nicotine (NICODERM CQ) 21 mg/24 hr TD 24 hr patch 21 mg 21 mg Transdermal Patch Removed [facility], RN     09/20/2023 8774 EDT nicotine (NICODERM CQ) 21 mg/24 hr TD 24 hr patch 21 mg 21 mg Transdermal Medication Applied [facility], RN     09/21/2023 0815 EDT enoxaparin (LOVENOX) subcutaneous injection 40 mg 40 mg Subcutaneous Given [facility], RN     09/20/2023 4111 EDT enoxaparin (LOVENOX) subcutaneous injection 40 mg 40 mg Subcutaneous Given [facility], RN     09/21/2023 0815 EDT amLODIPine (NORVASC) tablet 10 mg 10 mg Oral Given [facility], RN     09/20/2023 0919 EDT amLODIPine (NORVASC) tablet 10 mg 10 mg Oral Given [facility], RN     09/21/2023 1211 EDT carvedilol (COREG) tablet 25 mg 25 mg Oral Given [facility], RN     09/20/2023 1730 EDT carvedilol (COREG) tablet 25 mg 25 mg Oral Given Chiquita Camacho, RN     09/20/2023 2153 EDT senna oral syrup 8.8 mg 8.8 mg Per NG Tube [facility]     09/21/2023 5222 EDT guaiFENesin (ROBITUSSIN) oral liquid 200 mg 200 mg Oral Given [facility], RN     09/21/2023 0315 EDT guaiFENesin (ROBITUSSIN) oral liquid 200 mg 200 mg Oral Given Magdy Palacio, RN     09/20/2023 2100 EDT guaiFENesin (ROBITUSSIN) oral liquid 200 mg 200 mg Oral Given Magdy Palacio, TINO     09/20/2023 1323 EDT guaiFENesin (ROBITUSSIN) oral liquid 200 mg 200 mg Oral Given Rafaela Rodriguez RN     09/20/2023 0915 EDT guaiFENesin (ROBITUSSIN) oral liquid 200 mg 200 mg Oral Given Josemanuel Edgar RN     09/21/2023 7927 EDT oxyCODONE (ROXICODONE) oral solution 2.5 mg -- Oral See Alternative Josemanuel Edgar RN     09/21/2023 0887 EDT oxyCODONE (ROXICODONE) oral solution 2.5 mg -- Oral See Alternative Jono Kemp, TINO     09/20/2023 2006 EDT oxyCODONE (ROXICODONE) oral solution 2.5 mg -- Oral See Alternative Josemanuel Edgar, TINO     09/21/2023 9090 EDT oxyCODONE (ROXICODONE) oral solution 5 mg 5 mg Oral Given Josemanuel Edgar, TINO     09/21/2023 3517 EDT oxyCODONE (ROXICODONE) oral solution 5 mg 5 mg Oral Given Jono Kemp, TINO     09/20/2023 4001 EDT oxyCODONE (ROXICODONE) oral solution 5 mg 5 mg Oral Given Josemanuel Edgar RN     09/20/2023 2154 EDT acetaminophen (TYLENOL) tablet 650 mg 650 mg Oral Given Jono Kemp, TINO     09/20/2023 1824 EDT hydrALAZINE (APRESOLINE) injection 5 mg 5 mg Intravenous Given Pastor Ramey, TINO     09/20/2023 2058 EDT Gadobutrol injection (SINGLE-DOSE) SOLN 7.5 mL 7.5 mL Intravenous Given Silvia Magallon          /76   Pulse 94   Temp 99.3 °F (37.4 °C) (Oral)   Resp (!) 26   Wt 81.2 kg (179 lb 0.2 oz)   SpO2 98%   BMI 33.82 kg/m²       Physical Exam  Vitals reviewed. Constitutional:       General: She is not in acute distress.     Appearance: She is ill-appearing. She is not diaphoretic.      Comments: Appears ill, supine in bed, in no acute distress   HENT:      Head: Normocephalic and atraumatic.      Right Ear: External ear normal.      Left Ear: External ear normal.      Nose: Nose normal.      Mouth/Throat:      Mouth: Mucous membranes are moist.      Pharynx: Oropharynx is clear. Eyes:      Extraocular Movements: Extraocular movements intact.      Pupils: Pupils are equal, round, and reactive to light. Neck:      Comments: Trach in place  Cardiovascular:      Rate and Rhythm: Normal rate and regular rhythm.      Pulses: Normal pulses.      Heart sounds: No murmur heard.     No gallop.    Pulmonary:      Effort: Pulmonary effort is normal. No respiratory distress.      Breath sounds: No wheezing or rales. Abdominal:      General: Bowel sounds are normal. There is no distension.      Palpations: Abdomen is soft.      Tenderness: There is no abdominal tenderness. Musculoskeletal:      Right lower leg: No edema.      Left lower leg: No edema. Skin:     General: Skin is warm.      Capillary Refill: Capillary refill takes less than 2 seconds.      Coloration: Skin is not jaundiced or pale.      Findings: No lesion or rash.    Neurological:   Mental Status: She is alert and oriented; shake head appropriately Y/N but talk limited by Haig Smiles in place , with whispering attempted and lip talk deemed reasonable and expressing understanding of A&P above; she said today "want to fight the cancer"       Recent Results (from the past 48 hour(s))   CBC and differential    Collection Time: 09/20/23  5:27 AM   Result Value Ref Range    WBC 11.58 (H) 4.31 - 10.16 Thousand/uL    RBC 3.69 (L) 3.81 - 5.12 Million/uL    Hemoglobin 11.4 (L) 11.5 - 15.4 g/dL    Hematocrit 36.5 34.8 - 46.1 %    MCV 99 (H) 82 - 98 fL    MCH 30.9 26.8 - 34.3 pg    MCHC 31.2 (L) 31.4 - 37.4 g/dL    RDW 16.8 (H) 11.6 - 15.1 %    MPV 10.4 8.9 - 12.7 fL    Platelets 633 946 - 639 Thousands/uL    nRBC 0 /100 WBCs    Neutrophils Relative 82 (H) 43 - 75 %    Immat GRANS % 0 0 - 2 %    Lymphocytes Relative 7 (L) 14 - 44 %    Monocytes Relative 8 4 - 12 %    Eosinophils Relative 3 0 - 6 %    Basophils Relative 0 0 - 1 %    Neutrophils Absolute 9.48 (H) 1.85 - 7.62 Thousands/µL    Immature Grans Absolute 0.05 0.00 - 0.20 Thousand/uL    Lymphocytes Absolute 0.82 0.60 - 4.47 Thousands/µL    Monocytes Absolute 0.91 0.17 - 1.22 Thousand/µL    Eosinophils Absolute 0.29 0.00 - 0.61 Thousand/µL    Basophils Absolute 0.03 0.00 - 0.10 Thousands/µL   Comprehensive metabolic panel    Collection Time: 09/20/23  5:27 AM   Result Value Ref Range    Sodium 142 135 - 147 mmol/L    Potassium 3.9 3.5 - 5.3 mmol/L    Chloride 108 96 - 108 mmol/L    CO2 27 21 - 32 mmol/L    ANION GAP 7 mmol/L    BUN 23 5 - 25 mg/dL    Creatinine 0.76 0.60 - 1.30 mg/dL    Glucose 93 65 - 140 mg/dL    Calcium 8.6 8.4 - 10.2 mg/dL    Corrected Calcium 9.7 8.3 - 10.1 mg/dL    AST 15 13 - 39 U/L    ALT 14 7 - 52 U/L    Alkaline Phosphatase 48 34 - 104 U/L    Total Protein 5.7 (L) 6.4 - 8.4 g/dL    Albumin 2.6 (L) 3.5 - 5.0 g/dL    Total Bilirubin 0.81 0.20 - 1.00 mg/dL    eGFR 80 ml/min/1.73sq m   Magnesium    Collection Time: 09/20/23  5:27 AM   Result Value Ref Range    Magnesium 2.1 1.9 - 2.7 mg/dL   Phosphorus    Collection Time: 09/20/23  5:27 AM   Result Value Ref Range    Phosphorus 2.3 2.3 - 4.1 mg/dL   CBC and differential    Collection Time: 09/21/23  8:06 AM   Result Value Ref Range    WBC 9.94 4.31 - 10.16 Thousand/uL    RBC 3.61 (L) 3.81 - 5.12 Million/uL    Hemoglobin 11.4 (L) 11.5 - 15.4 g/dL    Hematocrit 35.6 34.8 - 46.1 %    MCV 99 (H) 82 - 98 fL    MCH 31.6 26.8 - 34.3 pg    MCHC 32.0 31.4 - 37.4 g/dL    RDW 16.2 (H) 11.6 - 15.1 %    MPV 10.1 8.9 - 12.7 fL    Platelets 834 125 - 006 Thousands/uL    nRBC 0 /100 WBCs    Neutrophils Relative 76 (H) 43 - 75 %    Immat GRANS % 1 0 - 2 %    Lymphocytes Relative 8 (L) 14 - 44 %    Monocytes Relative 12 4 - 12 %    Eosinophils Relative 3 0 - 6 %    Basophils Relative 0 0 - 1 %    Neutrophils Absolute 7.58 1.85 - 7.62 Thousands/µL    Immature Grans Absolute 0.05 0.00 - 0.20 Thousand/uL    Lymphocytes Absolute 0.82 0.60 - 4.47 Thousands/µL    Monocytes Absolute 1.14 0.17 - 1.22 Thousand/µL    Eosinophils Absolute 0.33 0.00 - 0.61 Thousand/µL    Basophils Absolute 0.02 0.00 - 0.10 Thousands/µL       XR chest portable    Result Date: 9/20/2023  Narrative: CHEST INDICATION:   NG tube placement. COMPARISON: 9/17/2023 EXAM PERFORMED/VIEWS:  XR CHEST PORTABLE FINDINGS: Tracheostomy tube is in stable position.  Distal portions of nasogastric tube are seen below the hemidiaphragm within the left upper abdomen. The tip of the tube extends beyond the inferior margin of this study. Heart shadow is enlarged but unchanged from prior exam. There is mild pulmonary vascular congestion. The left costophrenic angle is obscured. Hazy opacities are additionally noted within the right costophrenic angle. No evidence of acute osseous abnormality. Impression: 1. Nasogastric tube is seen with its distal portions within the stomach. The tip of the tube courses beyond the inferior margin of the study. 2. Pulmonary vascular congestion with obscuration of the left hemidiaphragm. This is stable from prior radiograph and may represent small left effusion versus consolidation. 3. Right basilar atelectasis versus trace right effusion. Workstation performed: [id number]     XR chest portable    Result Date: 9/18/2023  Narrative: CHEST INDICATION:   Assess. COMPARISON:  None EXAM PERFORMED/VIEWS:  XR CHEST PORTABLE Images: 2 FINDINGS: Tracheostomy tube is seen in appropriate position. A feeding tube is seen extending down below the dome of the diaphragm Cardiomegaly seen Increased lung markings seen suggest congestion left base is obscured Osseous structures appear within normal limits for patient age. Impression: Increased lung markings seen suggest congestion. The left base is obscured No worsening Workstation performed: [id number]     CT soft tissue neck w contrast    Result Date: 9/14/2023  Narrative: CT NECK WITH CONTRAST INDICATION:   Laryngeal edema COMPARISON:  None. TECHNIQUE:  Axial, sagittal, and coronal 2D reformatted images were created from the axial source data and submitted for interpretation. Radiation dose length product (DLP) for this visit:  769 mGy-cm . This examination, like all CT scans performed in the [facility], was performed utilizing techniques to minimize radiation dose exposure, including the use of iterative reconstruction and automated exposure control.  IV Contrast: 85 mL of iohexol (OMNIPAQUE) IMAGE QUALITY:  Diagnostic. FINDINGS: VISUALIZED BRAIN PARENCHYMA:  No acute intracranial pathology of the visualized brain parenchyma. VISUALIZED ORBITS: Normal visualized orbits. PARANASAL SINUSES: Normal visualized paranasal sinuses. Nasopharynx, oropharynx AND HYPOPHARYNX: There is a large heterogeneously enhancing mass identified within the neck involving multiple spaces. The origin is difficult to ascertain given the large size. This mass measures approximately 6.1 x 4.7 x 5.2 cm and appears to be centered within the left oropharynx. The mass extends superiorly into the nasopharynx left more so than right and into the posterior aspect of the nasal cavity. The mass also extends across the midline within the oropharynx and inferiorly into the left lateral oropharynx but not below the laryngeal ventricle. The mass extends laterally into the infratemporal fossa and parapharyngeal fat and is inseparable from infratemporal musculature and deep lobe of the parotid gland. There is extension into the prevertebral space where the mass is inseparable from the anterior paraspinal muscle on the left and posteriorly and laterally into the carotid space where the mass is displacing and inseparable from jugular and carotid. The mass encases the cervical internal carotid artery just below the level of the skull base resulting in narrowing of the vessel and the mass compresses and occludes the jugular vein below the skull base. The vessel does reconstitute via collateral vessels as it  extends inferiorly within the neck. The mass extends superiorly into the skull base inseparable from the carotid canal and jugular foramen. There is severe airway compromise within the posterior oral cavity and superior oropharynx. ET tube and OG tube are present. THYROID GLAND:  Unremarkable.  PAROTID AND SUBMANDIBULAR GLANDS: Normal parotid and submandibular glands other than the mass abutting the deep lobe of the left parotid gland. LYMPH NODES: Multiple small jugular chain nodes are seen on the left. VASCULAR STRUCTURES: As described above the mass partially encases the cervical internal carotid artery, narrowing the vessel and occludes the jugular vein from the skull base to the mid neck. Below this the vessel is unremarkable due to collateral reconstitution. THORACIC INLET: Posterior left upper lobe consolidation may represent atelectasis or pneumonia. Mild patchy groundglass opacity within the upper lung fields. BONY STRUCTURES: At T1-2 there is a left paramedian bridging osteophyte resulting in mild to moderate left anterior lateral canal stenosis. Impression: Large enhancing soft tissue mass identified within the left neck involving multiple spaces. This appears to be centered within the left oropharynx with extensions as described above into multiple spaces. This results in significant airway compromise. 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 Small level 3 and level 4 nodes are seen within the neck. I personally discussed this study with ICU resident on 9/14/2023 4:44 PM. Workstation performed: [id number]     Bronchoscopy    Result Date: 9/14/2023  Narrative: Table formatting from the original result was not included. [address] [place] [address] [phone] DATE OF SERVICE: 9/14/23 PHYSICIAN(S): Attending: No Staff Documented Fellow: No Staff Documented INDICATION: Acute respiratory failure with hypoxia ([address]) POST-OP DIAGNOSIS: See the impression below. PREPROCEDURE: Standard airway preparation completed per respiratory therapy protocol. Informed consent was obtained. Images reviewed prior to the procedure. A Time Out was performed. No suspicion or identified risk for TB or other airborne infectious disease; bronchoscopy procedure being performed for diagnostic purposes.  PROCEDURE: Bronchoscopy DETAILS OF PROCEDURE: Patient was taken to the procedure room where a time out was performed to confirm correct patient and correct procedure. The patient was already under sedation prior to the procedure. The patient's blood pressure, ECG, heart rate, level of consciousness, respirations and oxygen were monitored throughout the procedure. The patient experienced no blood loss. The scope was introduced through the endotracheal tube. The procedure was moderately difficult due to patient intolerance and persistent coughing. In response to procedure difficulty, deeper sedation was employed. The patient tolerated the procedure well. There were no apparent adverse events. ANESTHESIA INFORMATION: ASA: ASA status not filed in the log. Anesthesia Type: Anesthesia type not filed in the log.  FINDINGS: The lower trachea, main sujata, left main stem, KARTHIK, lingula, LLL, right main stem, RUL, bronchus intermedius, RML and RLL appeared normal. Left lower lung zone with no endobronchial lesions, left upper and lingula both appear normal Right upper, right middle and right lower lobes all appeared normal with no endobronchial lesions Endotracheal tube was retracted 3 cm under direct visualization with the bronchoscope Bronchoalveolar lavage was performed x1 in the right lateral segment (RB4) with 60 mL of saline instilled and a total return of 40 mL; the fluid appeared cloudy SPECIMENS: ID Type Source Tests Collected by Time Destination 1 :  Lavage Lung, Right Middle Lobe Bronchoalveolar Lavage PULMONARY CYTOLOGY Zuleika Vickers MD 9/14/2023 12:55 PM  A :  Bronchial Lung, Right Middle Lobe Bronchoalveolar Lavage FUNGAL CULTURE, VIRUS CULTURE, BRONCHIAL CULTURE AND GRAM STAIN, LEGIONELLA CULTURE, PNEUMOCYSTIS SMEAR BY DFA ([facility]), AFB CULTURE WITH STAIN Zuleika Vickers MD 9/14/2023 12:57 PM       Impression: BAL of the right middle lobe Endotracheal tube was retracted under direct visualization Tongue still appears swollen, vocal cords visualized outside of the endotracheal tube with the bronchoscope, no significant edema or mass noted RECOMMENDATION:  Follow-up infectious work-up      XR chest 1 view portable    Result Date: 9/13/2023  Narrative: CHEST INDICATION:   post intubation. COMPARISON: Same day chest radiograph and subsequent same day CT chest. Chest x-ray 8/27/2023. EXAM PERFORMED/VIEWS:  XR CHEST PORTABLE FINDINGS: Endotracheal tube terminates approximately 4 cm above the sujata. Heart shadow is enlarged but unchanged from prior exam. Bibasilar airspace opacities are again demonstrated. No appreciable pneumothorax. No evidence of acute osseous abnormality. Lucent lesion within T12 is better demonstrated on same-day CT. Impression: 1. Endotracheal tube terminates 4 cm above the sujata. 2. Redemonstration of bibasilar airspace opacities. Workstation performed: [id number]     XR chest 1 view portable    Result Date: 9/13/2023  Narrative: CHEST INDICATION:   post intubation, adjusting ET Tube. COMPARISON:  None EXAM PERFORMED/VIEWS:  XR CHEST PORTABLE FINDINGS: Endotracheal tube terminates approximately 3 cm above the sujata. Cardiomediastinal silhouette appears unremarkable. Bibasilar airspace opacities are again noted. No appreciable pneumothorax or pleural effusion. No evidence of acute osseous abnormality. Lucent lesion within T12 is better demonstrated on same-day CT. Impression: 1. Endotracheal tube terminates 3.3 cm above the sujata. 2. Bibasilar airspace opacities are again demonstrated. Workstation performed: [id number]     XR chest portable    Result Date: 9/13/2023  Narrative: CHEST INDICATION:   sob. COMPARISON: 8/27/2023. Subsequent CT chest performed the same day. EXAM PERFORMED/VIEWS:  XR CHEST PORTABLE FINDINGS: Heart shadow is enlarged but unchanged from prior exam. Hazy opacities are noted within the right lung base, possibly atelectasis versus pneumonia. Left basilar opacity is similar to prior radiograph. No appreciable pneumothorax.  No evidence of acute osseous abnormality. Cystic lesion within the T12 vertebral body is better characterized on same-day CT. Impression: 1. Hazy bibasilar airspace opacities which may represent atelectasis versus pneumonia. Left basilar opacity is similar to recent radiograph while right basilar opacity is new Workstation performed: [id number]     CTA ED chest PE Study    Result Date: 9/13/2023  Narrative: CTA - CHEST WITH IV CONTRAST - PULMONARY ANGIOGRAM INDICATION:   R/O PE. Reports shortness of breath since being discharged from the hospital 3 weeks ago for pneumonia. Fatigue. Productive cough with white sputum. Angioedema. COMPARISON: Chest radiograph 9/13/2023, chest CT 8/25/2023, thoracic spine CT 11/13/2013. TECHNIQUE: CT angiogram timed for optimal opacification of the pulmonary arteries. Axial, sagittal, and coronal 2D reformats created from source data. Coronal 3D MIP postprocessing on the acquisition scanner. Radiation dose length product (DLP):  472 mGy-cm . Radiation dose exposure minimized using iterative reconstruction and automated exposure control. IV Contrast:  85 mL of iohexol (OMNIPAQUE) FINDINGS: PULMONARY ARTERIES:  No pulmonary embolus. Moderate pulmonary artery enlargement. LUNGS: Mild patchy new consolidation in the medial segment right middle lobe. Improving opacity in the left upper lobe with mild residual atelectasis/scar. Redemonstration of mild dependent atelectasis in both lower lobes. Severe emphysema. AIRWAYS: No significant filling defects. ET tube 4 cm above the sujata. PLEURA:  Unremarkable. HEART/GREAT VESSELS: Moderate cardiomegaly. MEDIASTINUM AND PRASANTH: Slight regression of hilar and mediastinal lymphadenopathy. The largest node was previously 2.5 x 2.0 cm in the right infrahilar region and is now 1.9 x 1.3 cm (501/94). CHEST WALL AND LOWER NECK: Unremarkable. UPPER ABDOMEN: Right renal cysts.  OSSEOUS STRUCTURES: Redemonstration of the large cystic lesion with sclerotic margins in T12 with destruction of the superior and inferior endplates, present as a much smaller thin-walled cystic lesion on the thoracic spine CT from 2013, imaged on a thoracic spine MRI from 2020. Impression: No pulmonary embolus. Patchy consolidation right middle lobe, new from 8/25/2023, compatible with pneumonia. Slight regression of previous hilar and mediastinal lymphadenopathy. Improving left upper lobe opacity which may have been due to pneumonia with residual atelectasis/scar. Persistent partial atelectasis of the lower lobes. 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 Workstation performed: [id number]     Echo complete w/ contrast if indicated    Result Date: 8/28/2023  Narrative: •  Left Ventricle: Left ventricular cavity size is small. Wall thickness is increased. There is severe concentric hypertrophy. The left ventricular ejection fraction is 60%. Systolic function is normal. Wall motion is normal. Diastolic function is mildly abnormal, consistent with grade I (abnormal) relaxation. There is no LV dynamic obstruction at rest. •  Right Ventricle: Right ventricular cavity size is normal. Systolic function is normal. •  Left Atrium: The atrium is mildly dilated. •  Mitral Valve: There is mild to moderate regurgitation. There is systolic anterior motion of the chordal apparatus with late peaking gradient 29 mmHg during Valsalva maneuver. •  Pericardium: There is a trivial pericardial effusion along the right atrial free wall. XR chest portable ICU    Result Date: 8/28/2023  Narrative: CHEST INDICATION:   Acute hypoxic respiratory failure. COMPARISON: 8/25/2023 EXAM PERFORMED/VIEWS:  XR CHEST PORTABLE ICU FINDINGS: Heart shadow is enlarged but unchanged from prior exam. There is stable left-sided volume loss secondary to left basilar atelectasis. No pneumothorax or pleural effusion.  Osseous structures appear within normal limits for patient age. Impression: Stable volume loss on the left secondary to left lower lobe atelectasis. Workstation performed: [id number]     VAS lower limb venous duplex study, complete bilateral    Result Date: 8/27/2023  Narrative:  [facility] REPORT CLINICAL: Indications: Patient presents with bilateral lower extremity pain x 1 days. Operative History: cardiac surgery-date unknown. Risk Factors The patient has history of HTN and CKD. She has no history of Hyperlipidemia. CONCLUSION:  Impression: RIGHT LOWER LIMB: No evidence of acute or chronic deep vein thrombosis. No evidence of superficial thrombophlebitis noted. Doppler evaluation shows a normal response to augmentation maneuvers. . Popliteal, posterior tibial and anterior tibial arterial Doppler waveform's are triphasic. LEFT LOWER LIMB: No evidence of acute or chronic deep vein thrombosis. No evidence of superficial thrombophlebitis noted. Doppler evaluation shows a normal response to augmentation maneuvers. Popliteal, posterior tibial and anterior tibial arterial Doppler waveform's are triphasic. SIGNATURE: Electronically Signed by: [facility] on 2023-08-27 08:12:52 PM    CTA ED chest PE Study    Result Date: 8/25/2023  Narrative: CTA - CHEST WITH IV CONTRAST - PULMONARY ANGIOGRAM INDICATION:   Increased SOB, recent COVID diagnosis. COMPARISON: MRI from 3/18/2020 as well as bone scan from 7/2/2019 and thoracic spine from 11/13/2013 TECHNIQUE: CTA examination of the chest was performed using angiographic technique according to a protocol specifically tailored to evaluate for pulmonary embolism. Multiplanar 2D reformatted images were created from the source data. In addition, coronal 3D MIP postprocessing was performed on the acquisition scanner. The study is limited by some respiratory motion artifacts especially in the lower lobes on the left where there is crowding of vessels due to atelectatic changes.  Radiation dose length product (DLP) for this visit:  370 mGy-cm . This examination, like all CT scans performed in the [facility], was performed utilizing techniques to minimize radiation dose exposure, including the use of iterative reconstruction and automated exposure control. IV Contrast:  80 mL of iohexol (OMNIPAQUE) FINDINGS: PULMONARY ARTERIAL TREE: Limited visualization left lower lobe however there is suspicion for a small embolus in the posterobasal segment on axial image 139, series 305 and coronal image 142. No definite filling defect is seen elsewhere. The main pulmonary artery is significantly dilated at 4.2 cm. The RV LV ratio is elevated at 1.1 cm. The primary pulmonary vascular stent minutes are also dilated chronicity is uncertain. LUNGS: Emphysema is noted with blebs at the apices. There is peripheral consolidation in the left upper lobe. Air bronchogram is not well seen and there is mucous occlusion of the left mainstem bronchus with volume loss of the left upper lobe as well as  left lower lobe to a lesser extent. Some aeration in the left lower lobe is still visible. PLEURA:  Unremarkable. HEART/GREAT VESSELS:  Unremarkable for patient's age. No thoracic aortic aneurysm. MEDIASTINUM AND PRASANTH: 10 mm pretracheal node is identified. 9 mm superior mediastinal node is identified. 10 mm prevascular node is identified. Subcarinal node measures 1.5 cm. Hilar adenopathy is also demonstrated. Right hilar node is larger measuring 1.8 cm in short axis on image 138. CHEST WALL AND LOWER NECK:   Unremarkable. VISUALIZED STRUCTURES IN THE UPPER ABDOMEN: Low-density cyst is seen in the right kidney. . OSSEOUS STRUCTURES: There appears to be a destructive lesion involving the T12 vertebral body eroding both endplates and much of the vertebral body this does not appear to represent a Schmorl's node. A cystic lesion was noted in 2013 at this location however the current lesion is larger with loss of endplates.  MRI also imaged this lesion in 2020 the lesion appears somewhat larger on the current examination however. Impression: Limited study. There is equivocal embolus in the posterior basal segment left lower lobe. Other smaller segments are difficult to assess due to motion and vascular crowding. There is mucous plugging in the left mainstem bronchus with volume loss in portions of the left lower lobe and left upper lobe. Aspiration pneumonia is not excluded. There is dilatation of the main pulmonary artery and proximal pulmonary segment suggesting pulmonary hypertension this is more likely chronic than acute given the degree of distention. Emphysema is present. There is significant mediastinal and hilar adenopathy suggesting underlying neoplasm more likely than simple reactive nodes. Enlarging lesion at T12 is again demonstrated of uncertain etiology. This has been present since 2013. Perhaps a plasmacytoma is a consideration. Neoplastic work-up is advised. Pulmonology consultation is also advised to assess endobronchial obstruction on the left. This was discussed with resident physician Dr. En Ness at 4:58 p.m. Follow-up was marked in the epic system Workstation performed: [id number]     XR chest 1 view portable    Result Date: 8/25/2023  Narrative: CHEST INDICATION:   SOB. COMPARISON: 8/21/2021 EXAM PERFORMED/VIEWS:  XR CHEST PORTABLE Single view FINDINGS: Development of CHF. Probable left basal infiltrate. Cardiomediastinal silhouette has become more enlarged. No pneumothorax or pleural effusion. Osseous structures appear within normal limits for patient age. Impression: Increased cardiomegaly. Development of CHF. Probable left basal infiltrate Workstation performed: [id number]       I have personally reviewed labs, imaging studies, and pertinent reports. This note has been generated by voice recognition software system. Therefore, there may be spelling, grammar, and or syntax errors.  Please contact if questions arise.     Romain Quezada, DO   Hematology and Medical Oncology - PGY [address]

## 2023-09-21 NOTE — PROGRESS NOTES
OTOLARYNGOLOGY PROGRESS NOTE    Date of Service: 9/21/2023 6:49 AM    HPI  Patient is a 69 y.o. female w/ recent COVID/pneumonia requiring admission for infectious control earlier this yr, recently dc, [facility] on 9/13 due to SOB and hypoxia, w/ limited oral cavity space observed. Pt agreed to intubation for airway management on 9/14. CT imaging demonstrated L naso/oropharyngeal mass w/ some level 3/4 neck LAD, new R middle lobe patchy consolidation. Daughter at bedside answered questions, including pt is an active tobacco user for many yr, and has complained of worsening difficulty w/ breathing, "gum swelling," and discomfort at back of mouth for past 1.5-2wk. Pt's cause of acute difficult airway was attributed to angioedema due to lisinopril, w/ previous "angioedema" episode in 2020. Ddx being cancer vs infectious etiologies. Overnight Events: POD 5 s/p trach. On vent intermittently. Febrile ovn tmax 102.5    WBC: 12.59 (11.85) on zosyn & vanc    PHYSICAL EXAM:  Vitals:    09/21/23 0600   BP: (!) 172/84   Pulse: 91   Resp: (!) 26   Temp:    SpO2: 97%        General: Intubated  HEENT:  7 cuffed proximal XLT tracheostomy in place, CDI, minimal mucus suctioned   Neurology: Cannot assess neurologic fx  Lungs:  On ventilator  Cardio: RRR, well perfused  Abd: soft, NT, ND       Intake/Output Summary (Last 24 hours) at 9/21/2023 0649  Last data filed at 9/21/2023 0501  Gross per 24 hour   Intake 798 ml   Output 610 ml   Net 188 ml         LABORATORY    Recent Labs     09/19/23  0617 09/19/23  0704 09/20/23  0527   WBC 12.59*  --  11.58*   HGB 12.2 11.6 11.4*   HCT 37.4 34* 36.5     --  153       Recent Labs     09/19/23  0617 09/20/23  0527   K 3.6 3.9   * 108   BUN 27* 23   PHOS 2.4 2.3   MG 2.2 2.1       Invalid input(s): "PTPAT", "PTINR", "APTTMNNM", "APTTPAT"      Patient Active Problem List   Diagnosis   • Benign essential hypertension   • Bipolar I disorder, single manic episode ([address])   • Disc degeneration, lumbar   • 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   • Hyperlipidemia   • Lumbar radiculopathy   • Myofascial pain syndrome   • Pain syndrome, chronic   • Osteoarthritis of spine with radiculopathy, lumbar region   • Angio-edema, initial encounter   • Abnormal computed tomography angiography (CTA)   • Bipolar disorder, unspecified (HCC)   • Nicotine dependence   • Bone lesion   • CKD (chronic kidney disease) stage 3, GFR 30-59 ml/min ([facility])   • Acute respiratory failure with hypoxia secondary to oropharyngeal mass   • (HFpEF) heart failure with preserved ejection fraction ([facility])   • Pulmonary embolism ([facility])   • Vaginal itching   • Ambulatory dysfunction   • Angioedema   • RML pneumonia   • Oropharyngeal mass   • Blister (nonthermal) of left forearm, sequela   • COPD without exacerbation ([facility])         ASSESSMENT  Patient is a 69 y.o. female w/ acute and chronic problems as above, w/ naso/oropharyngeal mass observed on CT, likely inducing diminished airway space, requiring intubation for airway protection, now POD 5 s/p trach    PLAN  - biopsy consistent with lymphoma- management per med onc  - wean vent per ICU  - can change to uncuffed trach allowing for improved swallow and PMV once she is no longer requiring vent  - rest of care per primary  - ENT continues following

## 2023-09-22 ENCOUNTER — APPOINTMENT (INPATIENT)
Dept: RADIOLOGY | Facility: HOSPITAL | Age: 68
DRG: 004 | End: 2023-09-22
Payer: COMMERCIAL

## 2023-09-22 PROBLEM — J18.9 RML PNEUMONIA: Status: RESOLVED | Noted: 2023-09-13 | Resolved: 2023-09-22

## 2023-09-22 LAB
ALBUMIN SERPL BCP-MCNC: 2.7 G/DL (ref 3.5–5)
ALP SERPL-CCNC: 64 U/L (ref 34–104)
ALT SERPL W P-5'-P-CCNC: 16 U/L (ref 7–52)
ANION GAP SERPL CALCULATED.3IONS-SCNC: 7 MMOL/L
ANION GAP SERPL CALCULATED.3IONS-SCNC: 7 MMOL/L
AST SERPL W P-5'-P-CCNC: 15 U/L (ref 13–39)
BASOPHILS # BLD AUTO: 0.03 THOUSANDS/ÂΜL (ref 0–0.1)
BASOPHILS NFR BLD AUTO: 0 % (ref 0–1)
BILIRUB SERPL-MCNC: 0.59 MG/DL (ref 0.2–1)
BUN SERPL-MCNC: 14 MG/DL (ref 5–25)
BUN SERPL-MCNC: 23 MG/DL (ref 5–25)
CALCIUM ALBUM COR SERPL-MCNC: 10.2 MG/DL (ref 8.3–10.1)
CALCIUM SERPL-MCNC: 8.4 MG/DL (ref 8.4–10.2)
CALCIUM SERPL-MCNC: 9.2 MG/DL (ref 8.4–10.2)
CHLORIDE SERPL-SCNC: 103 MMOL/L (ref 96–108)
CHLORIDE SERPL-SCNC: 104 MMOL/L (ref 96–108)
CO2 SERPL-SCNC: 27 MMOL/L (ref 21–32)
CO2 SERPL-SCNC: 29 MMOL/L (ref 21–32)
CREAT SERPL-MCNC: 0.67 MG/DL (ref 0.6–1.3)
CREAT SERPL-MCNC: 0.74 MG/DL (ref 0.6–1.3)
EOSINOPHIL # BLD AUTO: 0.18 THOUSAND/ÂΜL (ref 0–0.61)
EOSINOPHIL NFR BLD AUTO: 2 % (ref 0–6)
ERYTHROCYTE [DISTWIDTH] IN BLOOD BY AUTOMATED COUNT: 16.1 % (ref 11.6–15.1)
GFR SERPL CREATININE-BSD FRML MDRD: 82 ML/MIN/1.73SQ M
GFR SERPL CREATININE-BSD FRML MDRD: 89 ML/MIN/1.73SQ M
GLUCOSE SERPL-MCNC: 101 MG/DL (ref 65–140)
GLUCOSE SERPL-MCNC: 129 MG/DL (ref 65–140)
HCT VFR BLD AUTO: 34 % (ref 34.8–46.1)
HGB BLD-MCNC: 10.9 G/DL (ref 11.5–15.4)
IMM GRANULOCYTES # BLD AUTO: 0.06 THOUSAND/UL (ref 0–0.2)
IMM GRANULOCYTES NFR BLD AUTO: 1 % (ref 0–2)
LDH SERPL-CCNC: 231 U/L (ref 140–271)
LDH SERPL-CCNC: 244 U/L (ref 140–271)
LYMPHOCYTES # BLD AUTO: 0.88 THOUSANDS/ÂΜL (ref 0.6–4.47)
LYMPHOCYTES NFR BLD AUTO: 11 % (ref 14–44)
MAGNESIUM SERPL-MCNC: 1.9 MG/DL (ref 1.9–2.7)
MCH RBC QN AUTO: 31.7 PG (ref 26.8–34.3)
MCHC RBC AUTO-ENTMCNC: 32.1 G/DL (ref 31.4–37.4)
MCV RBC AUTO: 99 FL (ref 82–98)
MONOCYTES # BLD AUTO: 1.31 THOUSAND/ÂΜL (ref 0.17–1.22)
MONOCYTES NFR BLD AUTO: 16 % (ref 4–12)
NEUTROPHILS # BLD AUTO: 5.84 THOUSANDS/ÂΜL (ref 1.85–7.62)
NEUTS SEG NFR BLD AUTO: 70 % (ref 43–75)
NRBC BLD AUTO-RTO: 0 /100 WBCS
PHOSPHATE SERPL-MCNC: 2.1 MG/DL (ref 2.3–4.1)
PHOSPHATE SERPL-MCNC: 3.3 MG/DL (ref 2.3–4.1)
PLATELET # BLD AUTO: 175 THOUSANDS/UL (ref 149–390)
PMV BLD AUTO: 10.1 FL (ref 8.9–12.7)
POTASSIUM SERPL-SCNC: 3.6 MMOL/L (ref 3.5–5.3)
POTASSIUM SERPL-SCNC: 3.7 MMOL/L (ref 3.5–5.3)
PROT SERPL-MCNC: 6 G/DL (ref 6.4–8.4)
RBC # BLD AUTO: 3.44 MILLION/UL (ref 3.81–5.12)
SCAN RESULT: NORMAL
SODIUM SERPL-SCNC: 138 MMOL/L (ref 135–147)
SODIUM SERPL-SCNC: 139 MMOL/L (ref 135–147)
URATE SERPL-MCNC: 2.7 MG/DL (ref 2–7.5)
URATE SERPL-MCNC: 2.9 MG/DL (ref 2–7.5)
WBC # BLD AUTO: 8.3 THOUSAND/UL (ref 4.31–10.16)

## 2023-09-22 PROCEDURE — 84100 ASSAY OF PHOSPHORUS: CPT | Performed by: STUDENT IN AN ORGANIZED HEALTH CARE EDUCATION/TRAINING PROGRAM

## 2023-09-22 PROCEDURE — 99232 SBSQ HOSP IP/OBS MODERATE 35: CPT | Performed by: INTERNAL MEDICINE

## 2023-09-22 PROCEDURE — 94760 N-INVAS EAR/PLS OXIMETRY 1: CPT

## 2023-09-22 PROCEDURE — 94640 AIRWAY INHALATION TREATMENT: CPT

## 2023-09-22 PROCEDURE — 80048 BASIC METABOLIC PNL TOTAL CA: CPT | Performed by: PHARMACIST

## 2023-09-22 PROCEDURE — 84550 ASSAY OF BLOOD/URIC ACID: CPT

## 2023-09-22 PROCEDURE — 83615 LACTATE (LD) (LDH) ENZYME: CPT | Performed by: STUDENT IN AN ORGANIZED HEALTH CARE EDUCATION/TRAINING PROGRAM

## 2023-09-22 PROCEDURE — 85025 COMPLETE CBC W/AUTO DIFF WBC: CPT | Performed by: STUDENT IN AN ORGANIZED HEALTH CARE EDUCATION/TRAINING PROGRAM

## 2023-09-22 PROCEDURE — 71045 X-RAY EXAM CHEST 1 VIEW: CPT

## 2023-09-22 PROCEDURE — 99232 SBSQ HOSP IP/OBS MODERATE 35: CPT | Performed by: OTOLARYNGOLOGY

## 2023-09-22 PROCEDURE — 84550 ASSAY OF BLOOD/URIC ACID: CPT | Performed by: STUDENT IN AN ORGANIZED HEALTH CARE EDUCATION/TRAINING PROGRAM

## 2023-09-22 PROCEDURE — 99291 CRITICAL CARE FIRST HOUR: CPT | Performed by: INTERNAL MEDICINE

## 2023-09-22 PROCEDURE — 83615 LACTATE (LD) (LDH) ENZYME: CPT

## 2023-09-22 PROCEDURE — 84100 ASSAY OF PHOSPHORUS: CPT

## 2023-09-22 PROCEDURE — 80053 COMPREHEN METABOLIC PANEL: CPT | Performed by: STUDENT IN AN ORGANIZED HEALTH CARE EDUCATION/TRAINING PROGRAM

## 2023-09-22 PROCEDURE — 83735 ASSAY OF MAGNESIUM: CPT | Performed by: STUDENT IN AN ORGANIZED HEALTH CARE EDUCATION/TRAINING PROGRAM

## 2023-09-22 PROCEDURE — 94150 VITAL CAPACITY TEST: CPT

## 2023-09-22 RX ORDER — HYDRALAZINE HYDROCHLORIDE 20 MG/ML
10 INJECTION INTRAMUSCULAR; INTRAVENOUS EVERY 6 HOURS PRN
Status: DISCONTINUED | OUTPATIENT
Start: 2023-09-22 | End: 2023-09-24

## 2023-09-22 RX ORDER — HYDROXYZINE HYDROCHLORIDE 25 MG/1
25 TABLET, FILM COATED ORAL EVERY 6 HOURS PRN
Status: DISCONTINUED | OUTPATIENT
Start: 2023-09-22 | End: 2023-09-22

## 2023-09-22 RX ORDER — FUROSEMIDE 10 MG/ML
20 INJECTION INTRAMUSCULAR; INTRAVENOUS ONCE
Status: COMPLETED | OUTPATIENT
Start: 2023-09-22 | End: 2023-09-22

## 2023-09-22 RX ORDER — MAGNESIUM SULFATE HEPTAHYDRATE 40 MG/ML
2 INJECTION, SOLUTION INTRAVENOUS ONCE
Status: COMPLETED | OUTPATIENT
Start: 2023-09-22 | End: 2023-09-22

## 2023-09-22 RX ORDER — CARVEDILOL 3.12 MG/1
3.12 TABLET ORAL 2 TIMES DAILY WITH MEALS
Status: CANCELLED | OUTPATIENT
Start: 2023-09-22

## 2023-09-22 RX ORDER — LISINOPRIL 20 MG/1
20 TABLET ORAL DAILY
Status: DISCONTINUED | OUTPATIENT
Start: 2023-09-23 | End: 2023-09-25

## 2023-09-22 RX ORDER — LABETALOL HYDROCHLORIDE 5 MG/ML
10 INJECTION, SOLUTION INTRAVENOUS ONCE
Status: COMPLETED | OUTPATIENT
Start: 2023-09-22 | End: 2023-09-22

## 2023-09-22 RX ADMIN — FAMOTIDINE 20 MG: 20 TABLET, FILM COATED ORAL at 17:19

## 2023-09-22 RX ADMIN — ETOPOSIDE: 20 INJECTION, SOLUTION INTRAVENOUS at 10:20

## 2023-09-22 RX ADMIN — CARVEDILOL 25 MG: 12.5 TABLET, FILM COATED ORAL at 08:45

## 2023-09-22 RX ADMIN — GUAIFENESIN 200 MG: 200 SOLUTION ORAL at 14:44

## 2023-09-22 RX ADMIN — GUAIFENESIN 200 MG: 200 SOLUTION ORAL at 08:38

## 2023-09-22 RX ADMIN — FLUCONAZOLE 400 MG: 100 TABLET ORAL at 08:39

## 2023-09-22 RX ADMIN — LEVALBUTEROL HYDROCHLORIDE 1.25 MG: 1.25 SOLUTION RESPIRATORY (INHALATION) at 19:46

## 2023-09-22 RX ADMIN — HYDRALAZINE HYDROCHLORIDE 10 MG: 20 INJECTION, SOLUTION INTRAMUSCULAR; INTRAVENOUS at 17:19

## 2023-09-22 RX ADMIN — OXYCODONE HYDROCHLORIDE 5 MG: 5 TABLET ORAL at 14:44

## 2023-09-22 RX ADMIN — GABAPENTIN 300 MG: 300 CAPSULE ORAL at 20:37

## 2023-09-22 RX ADMIN — GABAPENTIN 300 MG: 300 CAPSULE ORAL at 08:39

## 2023-09-22 RX ADMIN — MAGNESIUM SULFATE HEPTAHYDRATE 2 G: 40 INJECTION, SOLUTION INTRAVENOUS at 10:16

## 2023-09-22 RX ADMIN — ACYCLOVIR 400 MG: 200 CAPSULE ORAL at 17:19

## 2023-09-22 RX ADMIN — GUAIFENESIN 200 MG: 200 SOLUTION ORAL at 20:37

## 2023-09-22 RX ADMIN — NYSTATIN 500000 UNITS: 100000 SUSPENSION ORAL at 22:42

## 2023-09-22 RX ADMIN — AMLODIPINE BESYLATE 10 MG: 5 TABLET ORAL at 08:39

## 2023-09-22 RX ADMIN — BUSPIRONE HYDROCHLORIDE 30 MG: 10 TABLET ORAL at 20:41

## 2023-09-22 RX ADMIN — SULFAMETHOXAZOLE AND TRIMETHOPRIM 1 TABLET: 800; 160 TABLET ORAL at 08:40

## 2023-09-22 RX ADMIN — HYDRALAZINE HYDROCHLORIDE 5 MG: 20 INJECTION, SOLUTION INTRAMUSCULAR; INTRAVENOUS at 01:03

## 2023-09-22 RX ADMIN — LISINOPRIL 10 MG: 10 TABLET ORAL at 08:40

## 2023-09-22 RX ADMIN — CHLORHEXIDINE GLUCONATE 15 ML: 1.2 SOLUTION ORAL at 08:38

## 2023-09-22 RX ADMIN — FUROSEMIDE 20 MG: 10 INJECTION, SOLUTION INTRAMUSCULAR; INTRAVENOUS at 10:32

## 2023-09-22 RX ADMIN — ACYCLOVIR 400 MG: 200 CAPSULE ORAL at 08:39

## 2023-09-22 RX ADMIN — BUSPIRONE HYDROCHLORIDE 30 MG: 10 TABLET ORAL at 16:27

## 2023-09-22 RX ADMIN — IPRATROPIUM BROMIDE 0.5 MG: 0.5 SOLUTION RESPIRATORY (INHALATION) at 02:38

## 2023-09-22 RX ADMIN — ONDANSETRON 4 MG: 2 INJECTION INTRAMUSCULAR; INTRAVENOUS at 01:21

## 2023-09-22 RX ADMIN — Medication 10 MG: at 02:24

## 2023-09-22 RX ADMIN — ONDANSETRON: 2 INJECTION INTRAMUSCULAR; INTRAVENOUS at 08:54

## 2023-09-22 RX ADMIN — FOSAPREPITANT DIMEGLUMINE 150 MG: 150 INJECTION, POWDER, LYOPHILIZED, FOR SOLUTION INTRAVENOUS at 09:24

## 2023-09-22 RX ADMIN — CHLORHEXIDINE GLUCONATE 15 ML: 1.2 SOLUTION ORAL at 20:37

## 2023-09-22 RX ADMIN — PREDNISONE 100 MG: 50 TABLET ORAL at 09:40

## 2023-09-22 RX ADMIN — NICOTINE 21 MG: 21 PATCH, EXTENDED RELEASE TRANSDERMAL at 08:46

## 2023-09-22 RX ADMIN — Medication 8.8 MG: at 22:42

## 2023-09-22 RX ADMIN — IPRATROPIUM BROMIDE 0.5 MG: 0.5 SOLUTION RESPIRATORY (INHALATION) at 19:46

## 2023-09-22 RX ADMIN — SERTRALINE HYDROCHLORIDE 50 MG: 50 TABLET ORAL at 08:40

## 2023-09-22 RX ADMIN — PREDNISONE 100 MG: 50 TABLET ORAL at 16:27

## 2023-09-22 RX ADMIN — NYSTATIN 500000 UNITS: 100000 SUSPENSION ORAL at 17:19

## 2023-09-22 RX ADMIN — NYSTATIN 500000 UNITS: 100000 SUSPENSION ORAL at 08:38

## 2023-09-22 RX ADMIN — FAMOTIDINE 20 MG: 20 TABLET, FILM COATED ORAL at 08:44

## 2023-09-22 RX ADMIN — ALLOPURINOL 300 MG: 100 TABLET ORAL at 08:39

## 2023-09-22 RX ADMIN — ENOXAPARIN SODIUM 40 MG: 40 INJECTION SUBCUTANEOUS at 08:38

## 2023-09-22 RX ADMIN — OXYCODONE HYDROCHLORIDE 5 MG: 5 TABLET ORAL at 22:42

## 2023-09-22 RX ADMIN — LEVALBUTEROL HYDROCHLORIDE 1.25 MG: 1.25 SOLUTION RESPIRATORY (INHALATION) at 13:51

## 2023-09-22 RX ADMIN — LEVALBUTEROL HYDROCHLORIDE 1.25 MG: 1.25 SOLUTION RESPIRATORY (INHALATION) at 02:38

## 2023-09-22 RX ADMIN — LEVOFLOXACIN 500 MG: 250 TABLET, FILM COATED ORAL at 08:39

## 2023-09-22 RX ADMIN — IPRATROPIUM BROMIDE 0.5 MG: 0.5 SOLUTION RESPIRATORY (INHALATION) at 13:51

## 2023-09-22 RX ADMIN — IPRATROPIUM BROMIDE 0.5 MG: 0.5 SOLUTION RESPIRATORY (INHALATION) at 07:43

## 2023-09-22 RX ADMIN — GABAPENTIN 300 MG: 300 CAPSULE ORAL at 16:27

## 2023-09-22 RX ADMIN — LEVALBUTEROL HYDROCHLORIDE 1.25 MG: 1.25 SOLUTION RESPIRATORY (INHALATION) at 07:42

## 2023-09-22 RX ADMIN — BUSPIRONE HYDROCHLORIDE 30 MG: 10 TABLET ORAL at 08:38

## 2023-09-22 RX ADMIN — NYSTATIN 500000 UNITS: 100000 SUSPENSION ORAL at 13:05

## 2023-09-22 RX ADMIN — LEVALBUTEROL HYDROCHLORIDE 1.25 MG: 1.25 SOLUTION RESPIRATORY (INHALATION) at 23:18

## 2023-09-22 RX ADMIN — OXYCODONE HYDROCHLORIDE 5 MG: 5 SOLUTION ORAL at 09:46

## 2023-09-22 NOTE — ASSESSMENT & PLAN NOTE
Lab Results   Component Value Date    EGFR 89 09/22/2023    EGFR 81 09/21/2023    EGFR 80 09/20/2023    CREATININE 0.67 09/22/2023    CREATININE 0.75 09/21/2023    CREATININE 0.76 09/20/2023     Stable.

## 2023-09-22 NOTE — ASSESSMENT & PLAN NOTE
• POA with acute respiratory failure, SOB. • On physical in [place] ([place] ED: Swollen tongue, swelling soft and hard palate and almost the head of all phalanx is completely closed. Severe angioedema. • Decadron 10 mg, Benadryl 25 mg given. • Initially refused but eventually agreed with intubation. • Prior episode of angioedema in 2020 noted. Suspected coadministration of increase the dose of tramadol and lisinopril. • Per daughter, there is no recent change in medications except Trelegy inhaler, cefdinir. 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       Plan:  • Trach placed 9/17

## 2023-09-22 NOTE — RESPIRATORY THERAPY NOTE
RT Protocol Note  Smiley Backjoo 69 y.o. female MRN: [id number]  Unit/Bed#: ICU 06 Encounter: [id number]    Assessment    Principal Problem:    Acute respiratory failure with hypoxia secondary to oropharyngeal mass  Active Problems:    Benign essential hypertension    Hyperlipidemia    Pain syndrome, chronic    CKD (chronic kidney disease) stage 3, GFR 30-59 ml/min (HCC)    (HFpEF) heart failure with preserved ejection fraction (HCC)    Ambulatory dysfunction    Angioedema    Oropharyngeal mass    Blister (nonthermal) of left forearm, sequela    COPD without exacerbation (HCC)    Large cell lymphoma (HCC)    Chemotherapy induced neutropenia (HCC)      Home Pulmonary Medications:  Home bronchodilator       Past Medical History:   Diagnosis Date    Anxiety     Depression     Fatty liver     Hyperlipidemia     Hypertension     Psychiatric disorder     Varicella      Social History     Socioeconomic History    Marital status:      Spouse name: Not on file    Number of children: Not on file    Years of education: Not on file    Highest education level: Not on file   Occupational History    Occupation: retired   Tobacco Use    Smoking status: Every Day     Packs/day: 1.00     Types: Cigarettes    Smokeless tobacco: Never    Tobacco comments:     2 Black and milds per day   Vaping Use    Vaping Use: Never used   Substance and Sexual Activity    Alcohol use: Not Currently    Drug use: Yes     Types: Marijuana     Comment: for pain    Sexual activity: Not Currently     Partners: Male     Birth control/protection: None   Other Topics Concern    Not on file   Social History Narrative    Educational level-special ed/completed Master's Degree     Social Determinants of Health     Financial Resource Strain: Low Risk  (1/5/2023)    Overall Financial Resource Strain (CARDIA)     Difficulty of Paying Living Expenses: Not hard at all   Food Insecurity: Not on file   Transportation Needs: No Transportation Needs (9/18/2023) PRAPARE - Transportation     Lack of Transportation (Medical): No     Lack of Transportation (Non-Medical): No   Physical Activity: Not on file   Stress: Not on file   Social Connections: Not on file   Intimate Partner Violence: Not on file   Housing Stability: Not on file       Subjective         Objective    Physical Exam:   Assessment Type: (P) During-treatment  General Appearance: (P) Alert, Awake  Respiratory Pattern: (P) Dyspnea with exertion  Chest Assessment: (P) Chest expansion symmetrical  Bilateral Breath Sounds: (P) Diminished, Coarse  Cough: (P) Strong, Productive    Vitals:  Blood pressure 169/80, pulse 102, temperature 100.3 °F (37.9 °C), temperature source Axillary, resp. rate (!) 32, height 5' 1" (1.549 m), weight 81.2 kg (179 lb 0.2 oz), SpO2 93 %. Imaging and other studies: I have personally reviewed pertinent reports.          Plan    Respiratory Plan: (P) Home Bronchodilator Patient pathway, Vent/NIV/HFNC

## 2023-09-22 NOTE — QUICK NOTE
Tracheal sutures cut without complication.  Please contact ENT once patient has been without ventilatory support for 24 hours at least and we will perform trach change

## 2023-09-22 NOTE — ASSESSMENT & PLAN NOTE
· Well controlled at home. · Home meds: Amlodipine 10 mg daily, Coreg 25 mg twice daily, lisinopril 10 mg daily. · Elevated BP may second to anxiety from chemotherapy/new diagnosis lymphoma, chronic pain. Plan:  · Continue home meds for BP controll. · PRN hydralazine if BP >160. · Hydroxyzine as needed.

## 2023-09-22 NOTE — ASSESSMENT & PLAN NOTE
· Biopsy on 9/17: Large B-cell lymphoma, germinal center B-cell type, c-MYC and BCL-2 double expression. Ki-67 proliferation index is up to 90%; FISH & NGS pending. · Staging work-up: Currently stage II. · First inpatient chemotherapy 9/22/2023- with R-EPOCH. · Inpatient hematology oncology following. Recommendation appreciated. · Outpatient follow-up with hematology oncology.

## 2023-09-22 NOTE — NUTRITION
Recommend switching to standard tube feed formula. Jevity 1.5 @ 50 ml/hr. Water flushes of 150 ml q 4 hrs. Start @ 20 ml/hr and advance by 15 ml q 4 hrs until goal rate is reached. At goal TF regimen provides 1800 kcals, 77 g protein, and 1812 ml total fluids from formula + flushes. See Nutrition note dated 9/22/2023  for additional details. Completed nutrition assessment is viewable in the nutrition documentation.

## 2023-09-22 NOTE — PROGRESS NOTES
OTOLARYNGOLOGY PROGRESS NOTE    Date of Service: 9/22/2023 7:04 AM    HPI  Patient is a 69 y.o. female w/ recent COVID/pneumonia requiring admission for infectious control earlier this yr, recently dc, [facility] on 9/13 due to SOB and hypoxia, w/ limited oral cavity space observed. Pt agreed to intubation for airway management on 9/14. CT imaging demonstrated L naso/oropharyngeal mass w/ some level 3/4 neck LAD, new R middle lobe patchy consolidation. Daughter at bedside answered questions, including pt is an active tobacco user for many yr, and has complained of worsening difficulty w/ breathing, "gum swelling," and discomfort at back of mouth for past 1.5-2wk. Pt's cause of acute difficult airway was attributed to angioedema due to lisinopril, w/ previous "angioedema" episode in 2020. Ddx being cancer vs infectious etiologies. Overnight Events: POD 6 s/p trach. On vent intermittently. Pulled NG ovn    PHYSICAL EXAM:  Vitals:    09/22/23 0630   BP: 169/80   Pulse: 102   Resp: (!) 32   Temp:    SpO2:         General: Intubated  HEENT:  7 cuffed proximal XLT tracheostomy in place, CDI, minimal mucus suctioned   Neurology: Cannot assess neurologic fx  Lungs:  On ventilator  Cardio: RRR, well perfused  Abd: soft, NT, ND       Intake/Output Summary (Last 24 hours) at 9/22/2023 0704  Last data filed at 9/21/2023 2000  Gross per 24 hour   Intake 1080 ml   Output 1000 ml   Net 80 ml         LABORATORY    Recent Labs     09/20/23  0527 09/21/23  0806 09/22/23  0442   WBC 11.58* 9.94 8.30   HGB 11.4* 11.4* 10.9*   HCT 36.5 35.6 34.0*    171 175       Recent Labs     09/20/23  0527 09/21/23  0806 09/22/23  0442   K 3.9 4.0 3.7    105 103   BUN 23 20 14   PHOS 2.3 2.3 2.1*   MG 2.1  --  1.9       Invalid input(s): "PTPAT", "PTINR", "APTTMNNM", "APTTPAT"      Patient Active Problem List   Diagnosis   • Benign essential hypertension   • Bipolar I disorder, single manic episode (HCC)   • Disc degeneration, lumbar   • 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   • Hyperlipidemia   • Lumbar radiculopathy   • Myofascial pain syndrome   • Pain syndrome, chronic   • Osteoarthritis of spine with radiculopathy, lumbar region   • Angio-edema, initial encounter   • Abnormal computed tomography angiography (CTA)   • Bipolar disorder, unspecified (HCC)   • Nicotine dependence   • Bone lesion   • CKD (chronic kidney disease) stage 3, GFR 30-59 ml/min (HCC)   • Acute respiratory failure with hypoxia secondary to oropharyngeal mass   • (HFpEF) heart failure with preserved ejection fraction (HCC)   • Pulmonary embolism (HCC)   • Vaginal itching   • Ambulatory dysfunction   • Angioedema   • Oropharyngeal mass   • Blister (nonthermal) of left forearm, sequela   • COPD without exacerbation (HCC)   • Large cell lymphoma (HCC)   • Chemotherapy induced neutropenia (HCC)         ASSESSMENT  Patient is a 69 y.o. female w/ acute and chronic problems as above, w/ naso/oropharyngeal mass observed on CT, likely inducing diminished airway space, requiring intubation for airway protection, now POD 6 s/p trach    PLAN  - biopsy consistent with lymphoma- management per med onc- starting treatment today  - will discuss ng tube replacement with attending, considering PEG as she frankly aspirated on bedside swallow  - wean vent per ICU  - can change to uncuffed trach allowing for improved swallow and PMV once she is no longer requiring vent  - rest of care per primary  - ENT continues following

## 2023-09-22 NOTE — ASSESSMENT & PLAN NOTE
Lab Results   Component Value Date    CHOLESTEROL 203 (H) 01/24/2023    CHOLESTEROL 177 08/11/2022    CHOLESTEROL 184 07/08/2020     Lab Results   Component Value Date    HDL 45 (L) 01/24/2023    HDL 37 (L) 08/11/2022    HDL 44 (L) 07/08/2020     Lab Results   Component Value Date    TRIG 166 (H) 09/16/2023    TRIG 160 (H) 01/24/2023    TRIG 108 08/11/2022     Lab Results   Component Value Date    NONHDLC 146 07/12/2018    [address] [facility] 207 (H) 04/29/2013     Continue outpatient diet/ lifestyle modification.

## 2023-09-22 NOTE — QUICK NOTE
Chart and case were reviewed by Denise Elliott, [address] [place] [address]. Mode of review included electronic chart check. Goals goals are currently clear. Patient and family wish to continue level 1 code status with plans to begin chemotherapy today. Symptoms are being managed by ICU team at this time. For dispo plan, please review Case Management notes. Palliative care will return on 9/26/23. Patient is appropriate for outpatient follow-up. We will make arrangements through our office once discharged. For urgent issues or any questions/concerns, please notify on-call provider via [facility]. You may also call our answering service 24/7 at [phone]. RANDEE Reyna  Palliative and Supportive Care  Clinic/Answering Service: [phone]  You can find me on Pema!

## 2023-09-22 NOTE — PROGRESS NOTES
Medical Oncology/Hematology Progress Note  Sundeep Huitron female, 69 y.o., 1955,  ICU 06/ICU 06, [id number]     Please refer to the consult note for more details. CT AP with no lymphadenopathy; Brain MRI no lesion ; stage II  Large B Cell Lymphoma   UA (3.7 & 2.9 mg/dl) ; LD (276 & 244 u/L) both are WNL  Cr stable at baseline 0.67 today and LFT's WNL ; K 3.7 & Phos 2.1   Hepatitis Panel & HIV screening on 9/21 are both non-reactive   ECHO 8/28/23 EF 60 % and Grade I diastolic   PICC Line in Place   Lost NG tube at night, CC team following and arranging for replacement. Assessment and Plan  1. 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 Left Jugular Lymphadenopathy     9/22/23 Starting EPOCH-R cycle 1 day 1: Etoposide, Doxorubicin, Vincristine , Cyclophosphamide , prednisone + Rituximab to follow in the outpatient setting. Consent signed 9/21/23 by patient and her daughter, both understand and agree to the benefits vs risks of treatment, info about the treatment was provided verbally and in written. Close exam and monitoring for any reaction, toxicity, TLS, etc  Patient currently at CC unit  Daily CMP, CBC, LD , Uric Acid, and Phos    Outpatient follow up plan: Dr. Hope Tao 10/03/23     Communication with patient/family:  I did update the patient     Communication with team:  Did communicate with CC team     I did review this patient with Dr. Mike Perez and they are in agreement with this plan. Subjective:    Review of Systems   Constitutional: Negative for chills, diaphoresis and fever. HENT: Negative for ear pain. Neck mass    Eyes: Negative for pain and visual disturbance. Respiratory: Negative for cough, chest tightness, shortness of breath and wheezing. Cardiovascular: Negative for chest pain and palpitations.    Gastrointestinal: Negative for abdominal pain, blood in stool, diarrhea, nausea and vomiting. Genitourinary: Negative for difficulty urinating and hematuria. Musculoskeletal: Negative for back pain. Arms discomfort due to the IVs in place   Neurological: Negative for dizziness, weakness and headaches. Psychiatric/Behavioral: Negative for agitation.          Objective:     Medication Administration - last 24 hours from 09/21/2023 0724 to 09/22/2023 4361       Date/Time Order Dose Route Action Action by     09/21/2023 2237 EDT chlorhexidine (PERIDEX) 0.12 % oral rinse 15 mL 15 mL Mouth/Throat Given Nakul Jaramillo, RN     09/21/2023 1770 EDT chlorhexidine (PERIDEX) 0.12 % oral rinse 15 mL 15 mL Mouth/Throat Given Camila Clark, RN     09/21/2023 0815 EDT Famotidine (PF) (PEPCID) injection 20 mg 20 mg Intravenous Given Camila Clark, RN     09/21/2023 2137 EDT busPIRone (BUSPAR) tablet 30 mg 30 mg Oral Not Given Nakul Jaramillo, RN     09/21/2023 1631 EDT busPIRone (BUSPAR) tablet 30 mg 30 mg Oral Given Camila Clark, RN     09/21/2023 0840 EDT busPIRone (BUSPAR) tablet 30 mg 30 mg Oral Given Camila Clark, RN     09/21/2023 0815 EDT sertraline (ZOLOFT) tablet 50 mg 50 mg Oral Given Camila Clark, RN     09/21/2023 2236 EDT nystatin (MYCOSTATIN) oral suspension 500,000 Units 500,000 Units Swish & Swallow Given Nakul Jaramillo, RN     09/21/2023 1833 EDT nystatin (MYCOSTATIN) oral suspension 500,000 Units 500,000 Units Swish & Swallow Given Camila Clark, TINO     09/21/2023 1147 EDT nystatin (MYCOSTATIN) oral suspension 500,000 Units 500,000 Units Swish & Swallow Given Camila Clark, RN     09/21/2023 0815 EDT nystatin (MYCOSTATIN) oral suspension 500,000 Units 500,000 Units Swish & Swallow Given Camila Clark, TINO     09/22/2023 5238 EDT levalbuterol Joseph Ditch) inhalation solution 1.25 mg 1.25 mg Nebulization Given Gurinder Diehl, RT     09/21/2023 1950 EDT levalbuterol (XOPENEX) inhalation solution 1.25 mg 1.25 mg Nebulization Given Larisa Avendaño [place]     09/21/2023 1325 EDT levalbuterol (XOPENEX) inhalation solution 1.25 mg 1.25 mg Nebulization Given Mamadou Estes     09/21/2023 0747 EDT levalbuterol (XOPENEX) inhalation solution 1.25 mg 1.25 mg Nebulization Given Jessica Nissen, RT     09/22/2023 0238 EDT ipratropium (ATROVENT) 0.02 % inhalation solution 0.5 mg 0.5 mg Nebulization Given [employer], RT     09/21/2023 1950 EDT ipratropium (ATROVENT) 0.02 % inhalation solution 0.5 mg 0.5 mg Nebulization Given [employer], RT     09/21/2023 1325 EDT ipratropium (ATROVENT) 0.02 % inhalation solution 0.5 mg 0.5 mg Nebulization Given FEARVIND VAZQUEZ     09/21/2023 0747 EDT ipratropium (ATROVENT) 0.02 % inhalation solution 0.5 mg 0.5 mg Nebulization Given Venita Nissen, RT     09/21/2023 2137 EDT gabapentin (NEURONTIN) capsule 300 mg 300 mg Oral Not Given Deion Sloan RN     09/21/2023 1633 EDT gabapentin (NEURONTIN) capsule 300 mg 300 mg Oral Given Tracy Calixto RN     09/21/2023 2710 EDT gabapentin (NEURONTIN) capsule 300 mg 300 mg Oral Given Tracy Calixto, TINO     09/22/2023 7593 EDT oxyCODONE (ROXICODONE) IR tablet 5 mg 5 mg Oral Not Given Deion Sloan RN     09/21/2023 2138 EDT oxyCODONE (ROXICODONE) IR tablet 5 mg 5 mg Oral Not Given Deion Sloan RN     09/21/2023 1444 EDT oxyCODONE (ROXICODONE) IR tablet 5 mg 5 mg Oral Given Tracy Calixto RN     09/21/2023 0813 EDT nicotine (NICODERM CQ) 21 mg/24 hr TD 24 hr patch 21 mg 21 mg Transdermal Medication Applied Tracy Calixto RN     09/21/2023 3134 EDT nicotine (NICODERM CQ) 21 mg/24 hr TD 24 hr patch 21 mg 21 mg Transdermal Patch Removed Tracy Calixto RN     09/21/2023 0815 EDT enoxaparin (LOVENOX) subcutaneous injection 40 mg 40 mg Subcutaneous Given Tracy Calixto RN     09/21/2023 0815 EDT amLODIPine (NORVASC) tablet 10 mg 10 mg Oral Given Tracy Calixto RN     09/21/2023 1633 EDT carvedilol (COREG) tablet 25 mg 25 mg Oral Given Tracy Calixto RN     09/21/2023 6197 EDT carvedilol (COREG) tablet 25 mg 25 mg Oral Given Lillian Jenkins, RN     09/21/2023 2138 EDT senna oral syrup 8.8 mg 8.8 mg Per NG Tube Not Given Stefano Hansen, RN     09/22/2023 0230 EDT guaiFENesin (ROBITUSSIN) oral liquid 200 mg 200 mg Oral Not Given Stefano Hansen, RN     09/21/2023 2138 EDT guaiFENesin (ROBITUSSIN) oral liquid 200 mg 200 mg Oral Not Given Stefano Hansen, RN     09/21/2023 1443 EDT guaiFENesin (ROBITUSSIN) oral liquid 200 mg 200 mg Oral Given Lillian Jenkins, RN     09/21/2023 3718 EDT guaiFENesin (ROBITUSSIN) oral liquid 200 mg 200 mg Oral Given Lillian Jenkins, RN     09/21/2023 7063 EDT oxyCODONE (ROXICODONE) oral solution 2.5 mg -- Oral See Alternative Lillian Jenkins, RN     09/21/2023 0271 EDT oxyCODONE (ROXICODONE) oral solution 5 mg 5 mg Oral Given Lillian Jenkins, RN     09/22/2023 0121 EDT ondansetron (ZOFRAN) injection 4 mg 4 mg Intravenous Given Stefano Hansen, RN     09/21/2023 1817 EDT famotidine (PEPCID) tablet 20 mg 20 mg Oral Not Given Lillian Jenkins, RN     09/21/2023 1130 EDT lisinopril (ZESTRIL) tablet 10 mg 10 mg Oral Given Lillian Jenkins, RN     09/22/2023 0103 EDT hydrALAZINE (APRESOLINE) injection 5 mg 5 mg Intravenous Given Stefano Hansen, RN     09/21/2023 1722 EDT hydrALAZINE (APRESOLINE) injection 5 mg 5 mg Intravenous Given Lillian Jenkins, RN     09/21/2023 1444 EDT allopurinol (ZYLOPRIM) tablet 300 mg 300 mg Oral Given Lillian Jenkins, RN     09/21/2023 1816 EDT predniSONE tablet 100 mg 100 mg Oral Not Given Lillian Jenkins, RN     09/21/2023 1524 EDT iohexol (OMNIPAQUE) 350 MG/ML injection (SINGLE-DOSE) 100 mL 100 mL Intravenous Given Violette [place]     09/22/2023 0224 EDT labetalol (NORMODYNE) injection 10 mg 10 mg Intravenous Given Stefano Hansen RN          /80   Pulse 102   Temp 100.3 °F (37.9 °C) (Axillary)   Resp (!) 32   Ht 5' 1" (1.549 m)   Wt 81.2 kg (179 lb 0.2 oz)   SpO2 97%   BMI 33.82 kg/m²       Physical Exam  Constitutional:       Appearance: She is ill-appearing. HENT:      Head: Normocephalic and atraumatic. Comments: Trach in Place ; nasal secretions noted (present on admission and suspected d/t the mass obstruction)      Right Ear: External ear normal.      Left Ear: External ear normal.      Nose: Rhinorrhea present. No congestion. Mouth/Throat:      Mouth: Mucous membranes are moist.   Eyes:      Extraocular Movements: Extraocular movements intact. Conjunctiva/sclera: Conjunctivae normal.      Pupils: Pupils are equal, round, and reactive to light. Neck:      Comments: trach in place   Cardiovascular:      Rate and Rhythm: Normal rate and regular rhythm. Pulses: Normal pulses. Heart sounds: Normal heart sounds. No murmur heard. No gallop. Pulmonary:      Effort: Pulmonary effort is normal. No respiratory distress. Breath sounds: No wheezing, rhonchi or rales. Abdominal:      General: Bowel sounds are normal. There is no distension. Palpations: There is no mass. Tenderness: There is no abdominal tenderness. There is no guarding. Musculoskeletal:      Right lower leg: No edema. Left lower leg: No edema. Skin:     General: Skin is warm. Capillary Refill: Capillary refill takes less than 2 seconds. Coloration: Skin is not jaundiced or pale. Findings: Rash present. Comments: Skin peeled and some bruising noted on bilateral forearms L>R at/near sites of IV lines/blood draws ; today wrapped and clean with no signs of inflammation or infection; no discharge or fluid collection suspected    Neurological:      General: No focal deficit present. Mental Status: She is alert and oriented to person, place, and time. Psychiatric:         Mood and Affect: Mood normal.         Behavior: Behavior normal.         Thought Content:  Thought content normal.         Judgment: Judgment normal.           Recent Results (from the past 48 hour(s))   Chronic Hepatitis Panel    Collection Time: 09/21/23  8:06 AM Result Value Ref Range    Hepatitis B Surface Ag Non-reactive Non-Reactive    Hepatitis C Ab Non-reactive Non-Reactive    Hep B C IgM Non-reactive Non-Reactive    Hep B Core Total Ab Non-reactive Non-Reactive   HIV 1/2 AG/AB w Reflex [facility] for 2 yr old and above    Collection Time: 09/21/23  8:06 AM   Result Value Ref Range    HIV-1 p24 Antigen Non-Reactive Non-Reactive    HIV-1 Antibody Non-Reactive Non-Reactive    HIV-2 Antibody Non-Reactive Non-Reactive    HIV Ag-Ab 5th Gen Non-Reactive Non-Reactive   LD,Blood    Collection Time: 09/21/23  8:06 AM   Result Value Ref Range     (H) 140 - 271 U/L   Uric acid    Collection Time: 09/21/23  8:06 AM   Result Value Ref Range    Uric Acid 3.7 2.0 - 7.5 mg/dL   Comprehensive metabolic panel    Collection Time: 09/21/23  8:06 AM   Result Value Ref Range    Sodium 141 135 - 147 mmol/L    Potassium 4.0 3.5 - 5.3 mmol/L    Chloride 105 96 - 108 mmol/L    CO2 27 21 - 32 mmol/L    ANION GAP 9 mmol/L    BUN 20 5 - 25 mg/dL    Creatinine 0.75 0.60 - 1.30 mg/dL    Glucose 140 65 - 140 mg/dL    Calcium 9.0 8.4 - 10.2 mg/dL    Corrected Calcium 10.1 8.3 - 10.1 mg/dL    AST 20 13 - 39 U/L    ALT 19 7 - 52 U/L    Alkaline Phosphatase 65 34 - 104 U/L    Total Protein 5.9 (L) 6.4 - 8.4 g/dL    Albumin 2.6 (L) 3.5 - 5.0 g/dL    Total Bilirubin 0.54 0.20 - 1.00 mg/dL    eGFR 81 ml/min/1.73sq m   CBC and differential    Collection Time: 09/21/23  8:06 AM   Result Value Ref Range    WBC 9.94 4.31 - 10.16 Thousand/uL    RBC 3.61 (L) 3.81 - 5.12 Million/uL    Hemoglobin 11.4 (L) 11.5 - 15.4 g/dL    Hematocrit 35.6 34.8 - 46.1 %    MCV 99 (H) 82 - 98 fL    MCH 31.6 26.8 - 34.3 pg    MCHC 32.0 31.4 - 37.4 g/dL    RDW 16.2 (H) 11.6 - 15.1 %    MPV 10.1 8.9 - 12.7 fL    Platelets 399 452 - 309 Thousands/uL    nRBC 0 /100 WBCs    Neutrophils Relative 76 (H) 43 - 75 %    Immat GRANS % 1 0 - 2 %    Lymphocytes Relative 8 (L) 14 - 44 %    Monocytes Relative 12 4 - 12 %    Eosinophils Relative 3 0 - 6 %    Basophils Relative 0 0 - 1 %    Neutrophils Absolute 7.58 1.85 - 7.62 Thousands/µL    Immature Grans Absolute 0.05 0.00 - 0.20 Thousand/uL    Lymphocytes Absolute 0.82 0.60 - 4.47 Thousands/µL    Monocytes Absolute 1.14 0.17 - 1.22 Thousand/µL    Eosinophils Absolute 0.33 0.00 - 0.61 Thousand/µL    Basophils Absolute 0.02 0.00 - 0.10 Thousands/µL   Phosphorus    Collection Time: 09/21/23  8:06 AM   Result Value Ref Range    Phosphorus 2.3 2.3 - 4.1 mg/dL   CBC and differential    Collection Time: 09/22/23  4:42 AM   Result Value Ref Range    WBC 8.30 4.31 - 10.16 Thousand/uL    RBC 3.44 (L) 3.81 - 5.12 Million/uL    Hemoglobin 10.9 (L) 11.5 - 15.4 g/dL    Hematocrit 34.0 (L) 34.8 - 46.1 %    MCV 99 (H) 82 - 98 fL    MCH 31.7 26.8 - 34.3 pg    MCHC 32.1 31.4 - 37.4 g/dL    RDW 16.1 (H) 11.6 - 15.1 %    MPV 10.1 8.9 - 12.7 fL    Platelets 183 524 - 492 Thousands/uL    nRBC 0 /100 WBCs    Neutrophils Relative 70 43 - 75 %    Immat GRANS % 1 0 - 2 %    Lymphocytes Relative 11 (L) 14 - 44 %    Monocytes Relative 16 (H) 4 - 12 %    Eosinophils Relative 2 0 - 6 %    Basophils Relative 0 0 - 1 %    Neutrophils Absolute 5.84 1.85 - 7.62 Thousands/µL    Immature Grans Absolute 0.06 0.00 - 0.20 Thousand/uL    Lymphocytes Absolute 0.88 0.60 - 4.47 Thousands/µL    Monocytes Absolute 1.31 (H) 0.17 - 1.22 Thousand/µL    Eosinophils Absolute 0.18 0.00 - 0.61 Thousand/µL    Basophils Absolute 0.03 0.00 - 0.10 Thousands/µL   Comprehensive metabolic panel    Collection Time: 09/22/23  4:42 AM   Result Value Ref Range    Sodium 139 135 - 147 mmol/L    Potassium 3.7 3.5 - 5.3 mmol/L    Chloride 103 96 - 108 mmol/L    CO2 29 21 - 32 mmol/L    ANION GAP 7 mmol/L    BUN 14 5 - 25 mg/dL    Creatinine 0.67 0.60 - 1.30 mg/dL    Glucose 101 65 - 140 mg/dL    Calcium 9.2 8.4 - 10.2 mg/dL    Corrected Calcium 10.2 (H) 8.3 - 10.1 mg/dL    AST 15 13 - 39 U/L    ALT 16 7 - 52 U/L    Alkaline Phosphatase 64 34 - 104 U/L    Total Protein 6.0 (L) 6.4 - 8.4 g/dL    Albumin 2.7 (L) 3.5 - 5.0 g/dL    Total Bilirubin 0.59 0.20 - 1.00 mg/dL    eGFR 89 ml/min/1.73sq m   Uric acid    Collection Time: 09/22/23  4:42 AM   Result Value Ref Range    Uric Acid 2.9 2.0 - 7.5 mg/dL   LD,Blood    Collection Time: 09/22/23  4:42 AM   Result Value Ref Range     140 - 271 U/L   Magnesium    Collection Time: 09/22/23  4:42 AM   Result Value Ref Range    Magnesium 1.9 1.9 - 2.7 mg/dL   Phosphorus    Collection Time: 09/22/23  4:42 AM   Result Value Ref Range    Phosphorus 2.1 (L) 2.3 - 4.1 mg/dL       US right upper quadrant    Result Date: 9/22/2023  Narrative: RIGHT UPPER QUADRANT ULTRASOUND INDICATION:     CT finding N/V +Cancino. COMPARISON: CT abdomen/pelvis 9/21/2023 TECHNIQUE:   Real-time ultrasound of the right upper quadrant was performed with a curvilinear transducer with both volumetric sweeps and still imaging techniques. FINDINGS: Evaluation limited as per sonographer's note in PACS. PANCREAS:  Visualized portions of the pancreas are within normal limits. AORTA AND IVC:  Visualized portions are normal for patient age. LIVER: Size:  Within normal range. The liver measures 15.3 cm in the midclavicular line. Contour:  Surface contour is smooth. Parenchyma:  Echogenicity and echotexture are within normal limits. No liver mass identified. Limited imaging of the main portal vein shows it to be patent and hepatopetal. BILIARY: The gallbladder is normal in caliber. Gallbladder wall thickness upper limits of normal. No pericholecystic fluid. There is gallbladder sludge without evidence for shadowing calculi. No sonographic Cancino sign. No intrahepatic biliary dilatation. CBD measures 4.0 mm. No choledocholithiasis. KIDNEY: Right kidney measures 11.9 x 5.2 x 6.4 cm. Volume 207.6 mL Several simple cysts, the largest of which measures 4.1 cm. 2.8 cm septated cyst in the upper pole. No hydronephrosis or perinephric collection. ASCITES:   None.      Impression: No cholelithiasis. Gallbladder sludge is present with wall thickness upper limits of normal. Negative sonographic Cancino sign. Findings may be sequela of chronic gallbladder dysfunction. Consider follow-up with a HIDA scan if it would alter patient management. Workstation performed: [id number]     CT abdomen pelvis w contrast    Result Date: 9/21/2023  Narrative: CT ABDOMEN AND PELVIS WITH IV CONTRAST INDICATION:   Head/neck cancer, staging lymohoma staging prior to treatment with chemo. COMPARISON: 9/13/2023. TECHNIQUE:  CT examination of the abdomen and pelvis was performed. Multiplanar 2D reformatted images were created from the source data. This examination, like all CT scans performed in the [facility], was performed utilizing techniques to minimize radiation dose exposure, including the use of iterative reconstruction and automated exposure control. Radiation dose length product (DLP) for this visit:  813 mGy-cm IV Contrast:  100 mL of iohexol (OMNIPAQUE) Enteric Contrast:  Enteric contrast was not administered. FINDINGS: Mildly degraded by respiratory motion. ABDOMEN LOWER CHEST: There is bibasilar atelectasis. LIVER/BILIARY TREE:  Unremarkable. GALLBLADDER:  No calcified gallstones. There may be mild gallbladder wall thickening though evaluation is degraded by respiratory motion. No surrounding inflammatory changes. SPLEEN:  Unremarkable. PANCREAS:  Unremarkable. ADRENAL GLANDS:  Unremarkable. KIDNEYS/URETERS: Multiple bilateral renal cysts. No hydronephrosis. STOMACH AND BOWEL: There is colonic diverticulosis without evidence of acute diverticulitis. APPENDIX:  No findings to suggest appendicitis. ABDOMINOPELVIC CAVITY:  No ascites. No pneumoperitoneum. No lymphadenopathy. VESSELS:  Unremarkable for patient's age. PELVIS REPRODUCTIVE ORGANS:  Unremarkable for patient's age. URINARY BLADDER:  Unremarkable. ABDOMINAL WALL/INGUINAL REGIONS:  Fat containing right inguinal hernia noted. Fat-containing umbilical hernia. OSSEOUS STRUCTURES: Again seen is a destructive lytic lesion in the T12 vertebral body with sclerotic borders, progressed from 2013 and with chronic appearing pathologic fracture. Impression: 1. No abdominal lymphadenopathy. 2.  Question gallbladder wall thickening versus motion artifact. If there is clinical concern for gallbladder pathology, ultrasound is recommended. 3.  Chronic T12 lytic lesion with pathologic fracture. Workstation performed: [id number]     MRI soft tissue neck wo and w contrast    Result Date: 9/21/2023  Narrative: MRI OF THE SOFT TISSUES OF THE NECK - WITH AND WITHOUT CONTRAST INDICATION: Oropharyngeal mass s/p biopsy (+) for carcinoma COMPARISON: Neck CT from 9/14/2023 TECHNIQUE:  Multiplanar, multisequence imaging of the soft tissues of the neck was performed before and after gadolinium administration. . IV Contrast:  7.5 mL of Gadobutrol injection (SINGLE-DOSE) IMAGE QUALITY: Motion degrades images. FINDINGS: VISUALIZED BRAIN PARENCHYMA: No acute or suspicious findings. Please see today's brain MR report. VISUALIZED ORBITS AND PARANASAL SINUSES: Globes and orbits are within normal limits. Pan paranasal sinus mucosal thickening, greatest involving ethmoids and sphenoids. NASAL CAVITY, NASOPHARYNX, and OROHYPOPHARYNX and LARYNX: Approximately 7.5 x 4.0 x 7.8 cm (length X depth X width) soft tissue mass, epicenter likely the left lateral pharyngeal recess. Enhances and restricts diffusion. Central, irregular fluid signal intensity area without enhancement appears contiguous with fluid around an endotracheal tube, likely trapped secretions and circumferential mass. Mass extends from the left greater than right nasopharynx superiorly to the cricoid cartilage inferiorly. Extends to the posterior nasal cavity. Displaces the soft palate, uvula and tongue base anteriorly. Oral cavity is otherwise within normal limits.  Displaces the left pterygoid muscles anterolaterally. Extends posterior to the angle of the mandible approximately 1.4 cm, abutting and mildly laterally displacing the deep parotid, inseparable from the gland. Effaces the left parapharyngeal fat. Discrete epiglottis not seen, grossly anteriorly displaced. Extends along the left aryepiglottic fold and posterior pharyngeal wall to the to the inferior supraglottic airway, grossly terminating just above or at the left false cord. Extends to the left carotid space, encircles the carotid with severe narrowing and posterior-lateral displacement of the distal cervical and most proximal petrous vessel. Otherwise preserved left internal carotid flow-void. Posterior-lateral displacement  and occlusion of the internal jugular vein at the level of the angle of the mandible in the distal neck. Mass extends to the proximal jugular foramen. Laryngeal ventricles and true cords appear preserved. Normal fat signal  preserved in the cricoid and thyroid cartilages. Enteric and endotracheal tubes are displaced rightward. Trace retropharyngeal effusion. THYROID GLAND:   Within normal limits. PAROTID AND SUBMANDIBULAR GLANDS: Both submandibular and the right parotid glands are within normal limits. Deep left parotid involvement mentioned above. LYMPH NODES: Similar small jugular chain nodes more numerous on the left than the right, but none considered pathologically enlarged. 0.7 x 0.5 cm suspicious right retropharyngeal node (4/27). Left retropharyngeal space is obliterated by mass, no separate adenopathy. VASCULAR STRUCTURES: Left carotid a jugular involvement described above. Right carotid artery and jugular veins are within normal limits. THORACIC INLET: Similar to CT. Dependent lung opacities. CERVICAL SPINE: Normal appearance. OTHER: Left greater than right mastoid effusions with patchy left enhancement and restricted diffusion were described in today's brain MR report. Asymmetric opacity involves the left petrous apex. Normal appearance of the IACs and inner ear structures. No cerebellopontine angle mass. Impression: Known, large left neck mass described in detail above. Workstation performed: [id number]     MRI brain w wo contrast    Result Date: 9/21/2023  Narrative: MRI BRAIN WITHOUT AND WITH CONTRAST INDICATION:  Oropharyngeal mass s/p biopsy (+) for carcinoma . COMPARISON: 6/17/2017 CT of the brain TECHNIQUE:  Multiplanar/multisequence MRI of the brain was performed administration of contrast. IV Contrast:  7.5 mL of Gadobutrol injection (SINGLE-DOSE) IMAGE QUALITY:  Diagnostic. FINDINGS: BRAIN PARENCHYMA: No  hemorrhage, extra-axial fluid collection, acute infarct, mass effect or midline shift. Mild, focal patchy periventricular and subcortical white matter foci of abnormal T2 and FLAIR hyperintensity are nonspecific, but usually secondary to changes of microangiopathy. Small developmental venous angioma in the left posterior frontal lobe, typically clinically insignificant. No suspicious enhancement or masses. VENTRICLES:  Within normal limits for age. SELLA AND PITUITARY GLAND:  Within normal limits. ORBITS:  Within normal limits. PARANASAL SINUSES: Mucosal thickening. VASCULATURE: Preserved skull base flow voids. Normal enhancement. CALVARIUM AND SKULL BASE: Bilateral mastoid effusions are likely secondary mass, related to eustachian tube obstruction from nasopharyngeal mass. Patient is also intubated. Small mucosal retention cyst at the right fossa of Rosenmuller. Small ill-defined foci of enhancement in the superior mastoid and asymmetric left mastoid restricted diffusion. No coalescence or discrete mass. Skull and visualized cervical spine are within normal limits. EXTRACRANIAL SOFT TISSUES:  A nasopharyngeal mass will be described in further detail on today's neck MR report. Impression: No evidence of brain metastases.  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 Asymmetric patchy enhancement and restricted diffusion in the left mastoid. The differential includes neoplastic involvement and infection. Workstation performed: [id number]     XR chest portable    Result Date: 9/20/2023  Narrative: CHEST INDICATION:   NG tube placement. COMPARISON: 9/17/2023 EXAM PERFORMED/VIEWS:  XR CHEST PORTABLE FINDINGS: Tracheostomy tube is in stable position. Distal portions of nasogastric tube are seen below the hemidiaphragm within the left upper abdomen. The tip of the tube extends beyond the inferior margin of this study. Heart shadow is enlarged but unchanged from prior exam. There is mild pulmonary vascular congestion. The left costophrenic angle is obscured. Hazy opacities are additionally noted within the right costophrenic angle. No evidence of acute osseous abnormality. Impression: 1. Nasogastric tube is seen with its distal portions within the stomach. The tip of the tube courses beyond the inferior margin of the study. 2. Pulmonary vascular congestion with obscuration of the left hemidiaphragm. This is stable from prior radiograph and may represent small left effusion versus consolidation. 3. Right basilar atelectasis versus trace right effusion. Workstation performed: [id number]     XR chest portable    Result Date: 9/18/2023  Narrative: CHEST INDICATION:   Assess. COMPARISON:  None EXAM PERFORMED/VIEWS:  XR CHEST PORTABLE Images: 2 FINDINGS: Tracheostomy tube is seen in appropriate position. A feeding tube is seen extending down below the dome of the diaphragm Cardiomegaly seen Increased lung markings seen suggest congestion left base is obscured Osseous structures appear within normal limits for patient age. Impression: Increased lung markings seen suggest congestion.  The left base is obscured No worsening Workstation performed: [id number]     CT soft tissue neck w contrast    Result Date: 9/14/2023  Narrative: CT NECK WITH CONTRAST INDICATION: Laryngeal edema COMPARISON:  None. TECHNIQUE:  Axial, sagittal, and coronal 2D reformatted images were created from the axial source data and submitted for interpretation. Radiation dose length product (DLP) for this visit:  769 mGy-cm . This examination, like all CT scans performed in the [facility], was performed utilizing techniques to minimize radiation dose exposure, including the use of iterative reconstruction and automated exposure control. IV Contrast:  85 mL of iohexol (OMNIPAQUE) IMAGE QUALITY:  Diagnostic. FINDINGS: VISUALIZED BRAIN PARENCHYMA:  No acute intracranial pathology of the visualized brain parenchyma. VISUALIZED ORBITS: Normal visualized orbits. PARANASAL SINUSES: Normal visualized paranasal sinuses. Nasopharynx, oropharynx AND HYPOPHARYNX: There is a large heterogeneously enhancing mass identified within the neck involving multiple spaces. The origin is difficult to ascertain given the large size. This mass measures approximately 6.1 x 4.7 x 5.2 cm and appears to be centered within the left oropharynx. The mass extends superiorly into the nasopharynx left more so than right and into the posterior aspect of the nasal cavity. The mass also extends across the midline within the oropharynx and inferiorly into the left lateral oropharynx but not below the laryngeal ventricle. The mass extends laterally into the infratemporal fossa and parapharyngeal fat and is inseparable from infratemporal musculature and deep lobe of the parotid gland. There is extension into the prevertebral space where the mass is inseparable from the anterior paraspinal muscle on the left and posteriorly and laterally into the carotid space where the mass is displacing and inseparable from jugular and carotid.  The mass encases the cervical internal carotid artery just below the level of the skull base resulting in narrowing of the vessel and the mass compresses and occludes the jugular vein below the skull base. The vessel does reconstitute via collateral vessels as it  extends inferiorly within the neck. The mass extends superiorly into the skull base inseparable from the carotid canal and jugular foramen. There is severe airway compromise within the posterior oral cavity and superior oropharynx. ET tube and OG tube are present. THYROID GLAND:  Unremarkable. PAROTID AND SUBMANDIBULAR GLANDS: Normal parotid and submandibular glands other than the mass abutting the deep lobe of the left parotid gland. LYMPH NODES: Multiple small jugular chain nodes are seen on the left. VASCULAR STRUCTURES: As described above the mass partially encases the cervical internal carotid artery, narrowing the vessel and occludes the jugular vein from the skull base to the mid neck. Below this the vessel is unremarkable due to collateral reconstitution. THORACIC INLET: Posterior left upper lobe consolidation may represent atelectasis or pneumonia. Mild patchy groundglass opacity within the upper lung fields. BONY STRUCTURES: At T1-2 there is a left paramedian bridging osteophyte resulting in mild to moderate left anterior lateral canal stenosis. Impression: Large enhancing soft tissue mass identified within the left neck involving multiple spaces. This appears to be centered within the left oropharynx with extensions as described above into multiple spaces. This results in significant airway compromise. 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 Small level 3 and level 4 nodes are seen within the neck. I personally discussed this study with ICU resident on 9/14/2023 4:44 PM. Workstation performed: [id number]     Bronchoscopy    Result Date: 9/14/2023  Narrative: Table formatting from the original result was not included.  [address] [place] [address] [phone] DATE OF SERVICE: 9/14/23 PHYSICIAN(S): Attending: No Staff Documented Fellow: No Staff Documented INDICATION: Acute respiratory failure with hypoxia (HCC) POST-OP DIAGNOSIS: See the impression below. PREPROCEDURE: Standard airway preparation completed per respiratory therapy protocol. Informed consent was obtained. Images reviewed prior to the procedure. A Time Out was performed. No suspicion or identified risk for TB or other airborne infectious disease; bronchoscopy procedure being performed for diagnostic purposes. PROCEDURE: Bronchoscopy DETAILS OF PROCEDURE: Patient was taken to the procedure room where a time out was performed to confirm correct patient and correct procedure. The patient was already under sedation prior to the procedure. The patient's blood pressure, ECG, heart rate, level of consciousness, respirations and oxygen were monitored throughout the procedure. The patient experienced no blood loss. The scope was introduced through the endotracheal tube. The procedure was moderately difficult due to patient intolerance and persistent coughing. In response to procedure difficulty, deeper sedation was employed. The patient tolerated the procedure well. There were no apparent adverse events. ANESTHESIA INFORMATION: ASA: ASA status not filed in the log. Anesthesia Type: Anesthesia type not filed in the log.  FINDINGS: The lower trachea, main sujata, left main stem, KARTHIK, lingula, LLL, right main stem, RUL, bronchus intermedius, RML and RLL appeared normal. Left lower lung zone with no endobronchial lesions, left upper and lingula both appear normal Right upper, right middle and right lower lobes all appeared normal with no endobronchial lesions Endotracheal tube was retracted 3 cm under direct visualization with the bronchoscope Bronchoalveolar lavage was performed x1 in the right lateral segment (RB4) with 60 mL of saline instilled and a total return of 40 mL; the fluid appeared cloudy SPECIMENS: ID Type Source Tests Collected by Time Destination 1 :  Lavage Lung, Right Middle Lobe Bronchoalveolar Lavage PULMONARY CYTOLOGY Manuel Boyer MD 9/14/2023 12:55 PM  A :  Bronchial Lung, Right Middle Lobe Bronchoalveolar Lavage FUNGAL CULTURE, VIRUS CULTURE, BRONCHIAL CULTURE AND GRAM STAIN, LEGIONELLA CULTURE, PNEUMOCYSTIS SMEAR BY DFA ([facility]), AFB CULTURE WITH STAIN Manuel Boyer MD 9/14/2023 12:57 PM       Impression: BAL of the right middle lobe Endotracheal tube was retracted under direct visualization Tongue still appears swollen, vocal cords visualized outside of the endotracheal tube with the bronchoscope, no significant edema or mass noted RECOMMENDATION:  Follow-up infectious work-up      XR chest 1 view portable    Result Date: 9/13/2023  Narrative: CHEST INDICATION:   post intubation. COMPARISON: Same day chest radiograph and subsequent same day CT chest. Chest x-ray 8/27/2023. EXAM PERFORMED/VIEWS:  XR CHEST PORTABLE FINDINGS: Endotracheal tube terminates approximately 4 cm above the sujata. Heart shadow is enlarged but unchanged from prior exam. Bibasilar airspace opacities are again demonstrated. No appreciable pneumothorax. No evidence of acute osseous abnormality. Lucent lesion within T12 is better demonstrated on same-day CT. Impression: 1. Endotracheal tube terminates 4 cm above the sujata. 2. Redemonstration of bibasilar airspace opacities. Workstation performed: [id number]     XR chest 1 view portable    Result Date: 9/13/2023  Narrative: CHEST INDICATION:   post intubation, adjusting ET Tube. COMPARISON:  None EXAM PERFORMED/VIEWS:  XR CHEST PORTABLE FINDINGS: Endotracheal tube terminates approximately 3 cm above the sujata. Cardiomediastinal silhouette appears unremarkable. Bibasilar airspace opacities are again noted. No appreciable pneumothorax or pleural effusion. No evidence of acute osseous abnormality. Lucent lesion within T12 is better demonstrated on same-day CT. Impression: 1. Endotracheal tube terminates 3.3 cm above the sujata. 2. Bibasilar airspace opacities are again demonstrated. Workstation performed: [id number]     XR chest portable    Result Date: 9/13/2023  Narrative: CHEST INDICATION:   sob. COMPARISON: 8/27/2023. Subsequent CT chest performed the same day. EXAM PERFORMED/VIEWS:  XR CHEST PORTABLE FINDINGS: Heart shadow is enlarged but unchanged from prior exam. Hazy opacities are noted within the right lung base, possibly atelectasis versus pneumonia. Left basilar opacity is similar to prior radiograph. No appreciable pneumothorax. No evidence of acute osseous abnormality. Cystic lesion within the T12 vertebral body is better characterized on same-day CT. Impression: 1. Hazy bibasilar airspace opacities which may represent atelectasis versus pneumonia. Left basilar opacity is similar to recent radiograph while right basilar opacity is new Workstation performed: [id number]     CTA ED chest PE Study    Result Date: 9/13/2023  Narrative: CTA - CHEST WITH IV CONTRAST - PULMONARY ANGIOGRAM INDICATION:   R/O PE. Reports shortness of breath since being discharged from the hospital 3 weeks ago for pneumonia. Fatigue. Productive cough with white sputum. Angioedema. COMPARISON: Chest radiograph 9/13/2023, chest CT 8/25/2023, thoracic spine CT 11/13/2013. TECHNIQUE: CT angiogram timed for optimal opacification of the pulmonary arteries. Axial, sagittal, and coronal 2D reformats created from source data. Coronal 3D MIP postprocessing on the acquisition scanner. Radiation dose length product (DLP):  472 mGy-cm . Radiation dose exposure minimized using iterative reconstruction and automated exposure control. IV Contrast:  85 mL of iohexol (OMNIPAQUE) FINDINGS: PULMONARY ARTERIES:  No pulmonary embolus. Moderate pulmonary artery enlargement. LUNGS: Mild patchy new consolidation in the medial segment right middle lobe. Improving opacity in the left upper lobe with mild residual atelectasis/scar. Redemonstration of mild dependent atelectasis in both lower lobes. Severe emphysema.  AIRWAYS: No significant filling defects. ET tube 4 cm above the sujata. PLEURA:  Unremarkable. HEART/GREAT VESSELS: Moderate cardiomegaly. MEDIASTINUM AND PRASANTH: Slight regression of hilar and mediastinal lymphadenopathy. The largest node was previously 2.5 x 2.0 cm in the right infrahilar region and is now 1.9 x 1.3 cm (501/94). CHEST WALL AND LOWER NECK: Unremarkable. UPPER ABDOMEN: Right renal cysts. OSSEOUS STRUCTURES: Redemonstration of the large cystic lesion with sclerotic margins in T12 with destruction of the superior and inferior endplates, present as a much smaller thin-walled cystic lesion on the thoracic spine CT from 2013, imaged on a thoracic spine MRI from 2020. Impression: No pulmonary embolus. Patchy consolidation right middle lobe, new from 8/25/2023, compatible with pneumonia. Slight regression of previous hilar and mediastinal lymphadenopathy. Improving left upper lobe opacity which may have been due to pneumonia with residual atelectasis/scar. Persistent partial atelectasis of the lower lobes. 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 Workstation performed: [id number]     Echo complete w/ contrast if indicated    Result Date: 8/28/2023  Narrative: •  Left Ventricle: Left ventricular cavity size is small. Wall thickness is increased. There is severe concentric hypertrophy. The left ventricular ejection fraction is 60%. Systolic function is normal. Wall motion is normal. Diastolic function is mildly abnormal, consistent with grade I (abnormal) relaxation. There is no LV dynamic obstruction at rest. •  Right Ventricle: Right ventricular cavity size is normal. Systolic function is normal. •  Left Atrium: The atrium is mildly dilated. •  Mitral Valve: There is mild to moderate regurgitation. There is systolic anterior motion of the chordal apparatus with late peaking gradient 29 mmHg during Valsalva maneuver.  •  Pericardium: There is a trivial pericardial effusion along the right atrial free wall. XR chest portable ICU    Result Date: 8/28/2023  Narrative: CHEST INDICATION:   Acute hypoxic respiratory failure. COMPARISON: 8/25/2023 EXAM PERFORMED/VIEWS:  XR CHEST PORTABLE ICU FINDINGS: Heart shadow is enlarged but unchanged from prior exam. There is stable left-sided volume loss secondary to left basilar atelectasis. No pneumothorax or pleural effusion. Osseous structures appear within normal limits for patient age. Impression: Stable volume loss on the left secondary to left lower lobe atelectasis. Workstation performed: [id number]     VAS lower limb venous duplex study, complete bilateral    Result Date: 8/27/2023  Narrative:  [facility] REPORT CLINICAL: Indications: Patient presents with bilateral lower extremity pain x 1 days. Operative History: cardiac surgery-date unknown. Risk Factors The patient has history of HTN and CKD. She has no history of Hyperlipidemia. CONCLUSION:  Impression: RIGHT LOWER LIMB: No evidence of acute or chronic deep vein thrombosis. No evidence of superficial thrombophlebitis noted. Doppler evaluation shows a normal response to augmentation maneuvers. . Popliteal, posterior tibial and anterior tibial arterial Doppler waveform's are triphasic. LEFT LOWER LIMB: No evidence of acute or chronic deep vein thrombosis. No evidence of superficial thrombophlebitis noted. Doppler evaluation shows a normal response to augmentation maneuvers. Popliteal, posterior tibial and anterior tibial arterial Doppler waveform's are triphasic. SIGNATURE: Electronically Signed by: Sina Shirley on 2023-08-27 08:12:52 PM    CTA ED chest PE Study    Result Date: 8/25/2023  Narrative: CTA - CHEST WITH IV CONTRAST - PULMONARY ANGIOGRAM INDICATION:   Increased SOB, recent COVID diagnosis.  COMPARISON: MRI from 3/18/2020 as well as bone scan from 7/2/2019 and thoracic spine from 11/13/2013 TECHNIQUE: CTA examination of the chest was performed using angiographic technique according to a protocol specifically tailored to evaluate for pulmonary embolism. Multiplanar 2D reformatted images were created from the source data. In addition, coronal 3D MIP postprocessing was performed on the acquisition scanner. The study is limited by some respiratory motion artifacts especially in the lower lobes on the left where there is crowding of vessels due to atelectatic changes. Radiation dose length product (DLP) for this visit:  370 mGy-cm . This examination, like all CT scans performed in the [facility], was performed utilizing techniques to minimize radiation dose exposure, including the use of iterative reconstruction and automated exposure control. IV Contrast:  80 mL of iohexol (OMNIPAQUE) FINDINGS: PULMONARY ARTERIAL TREE: Limited visualization left lower lobe however there is suspicion for a small embolus in the posterobasal segment on axial image 139, series 305 and coronal image 142. No definite filling defect is seen elsewhere. The main pulmonary artery is significantly dilated at 4.2 cm. The RV LV ratio is elevated at 1.1 cm. The primary pulmonary vascular stent minutes are also dilated chronicity is uncertain. LUNGS: Emphysema is noted with blebs at the apices. There is peripheral consolidation in the left upper lobe. Air bronchogram is not well seen and there is mucous occlusion of the left mainstem bronchus with volume loss of the left upper lobe as well as  left lower lobe to a lesser extent. Some aeration in the left lower lobe is still visible. PLEURA:  Unremarkable. HEART/GREAT VESSELS:  Unremarkable for patient's age. No thoracic aortic aneurysm. MEDIASTINUM AND PRASANTH: 10 mm pretracheal node is identified. 9 mm superior mediastinal node is identified. 10 mm prevascular node is identified. Subcarinal node measures 1.5 cm. Hilar adenopathy is also demonstrated.  Right hilar node is larger measuring 1.8 cm in short axis on image 138. CHEST WALL AND LOWER NECK:   Unremarkable. VISUALIZED STRUCTURES IN THE UPPER ABDOMEN: Low-density cyst is seen in the right kidney. . OSSEOUS STRUCTURES: There appears to be a destructive lesion involving the T12 vertebral body eroding both endplates and much of the vertebral body this does not appear to represent a Schmorl's node. A cystic lesion was noted in 2013 at this location however the current lesion is larger with loss of endplates. MRI also imaged this lesion in 2020 the lesion appears somewhat larger on the current examination however. Impression: Limited study. There is equivocal embolus in the posterior basal segment left lower lobe. Other smaller segments are difficult to assess due to motion and vascular crowding. There is mucous plugging in the left mainstem bronchus with volume loss in portions of the left lower lobe and left upper lobe. Aspiration pneumonia is not excluded. There is dilatation of the main pulmonary artery and proximal pulmonary segment suggesting pulmonary hypertension this is more likely chronic than acute given the degree of distention. Emphysema is present. There is significant mediastinal and hilar adenopathy suggesting underlying neoplasm more likely than simple reactive nodes. Enlarging lesion at T12 is again demonstrated of uncertain etiology. This has been present since 2013. Perhaps a plasmacytoma is a consideration. Neoplastic work-up is advised. Pulmonology consultation is also advised to assess endobronchial obstruction on the left. This was discussed with resident physician Dr. Kaleb Pringle at 4:58 p.m. Follow-up was marked in the epic system Workstation performed: [id number]     XR chest 1 view portable    Result Date: 8/25/2023  Narrative: CHEST INDICATION:   SOB. COMPARISON: 8/21/2021 EXAM PERFORMED/VIEWS:  XR CHEST PORTABLE Single view FINDINGS: Development of CHF. Probable left basal infiltrate. Cardiomediastinal silhouette has become more enlarged. No pneumothorax or pleural effusion. Osseous structures appear within normal limits for patient age. Impression: Increased cardiomegaly. Development of CHF. Probable left basal infiltrate Workstation performed: [id number]       I have personally reviewed labs, imaging studies, and pertinent reports. This note has been generated by voice recognition software system. Therefore, there may be spelling, grammar, and or syntax errors. Please contact if questions arise.      Romain Quezada,    Hematology and Medical Oncology - PGY [place]

## 2023-09-22 NOTE — ASSESSMENT & PLAN NOTE
• POA with O2 saturate around 80% on room air. Needed high flow 60 L to bring up her O2 saturation to 95%. • Recent hospitalization for respiratory failure second to pneumonia. • CTA PE study negative for PE. New onset RML PNA. • Persistent partial atelectasis of the lower lobes noted. • Current daily smoker. 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     Plan:  • Tracheostomy 9/17. • Completed Decadron. • Atrovent/Xopenex  • Airway clearance protocol.  IS.

## 2023-09-22 NOTE — WOUND OSTOMY CARE
Consult Note - Wound   Ahmad Alpers 69 y.o. female MRN: [id number]  Unit/Bed#: ICU 06 Encounter: [id number]        History and Present Illness:  Patient is 70 yo female [facility] with ARF with hypoxia secondary to mass. Patient has history of COPD, tobacco use, HF, and HTN. Patient is incontinent of bowel and bladder. Patient is max assist with turning from side to side for assessment. Patient is independent with meals. Assessment Findings:   Sacral/buttocks and B/L heels intact and blanching, preventative skin care orders placed. 1. Trach erosion/MASD- area of full thickness skin loss from trach coupled with irritation from consistent exposure to mucous, wound tissue is yellow and periwound skin fragile, moderate amount of clear mucous noted at site. Trach plate is still sutured to neck. No induration, fluctuance, odor, warmth/temperature differences, redness, or purulence noted to the above noted wounds and skin areas assessed. New dressings applied per orders listed below. Patient tolerated well- no s/s of non-verbal pain or discomfort observed during the encounter. Bedside nurse aware of plan of care. See flow sheets for more detailed assessment findings. Wound care will continue to follow. Skin care plans:  1-Hydraguard to bilateral sacrum, buttock and heels BID and PRN  2-Elevate heels to offload pressure. 3-Ehob cushion in chair when out of bed. 4-Moisturize skin daily with skin nourishing cream.  5-Turn/reposition q2h or when medically stable for pressure re-distribution on skin. 6-Cleanse neck wound with normal saline, apply maxorb to wound, cover with 4X4 drain sponge, change daily and PRN soilage/displacement. Wounds:  Wound 09/21/23 Pressure Injury Throat Medial (Active)   Wound Image   09/22/23 1047   Wound Description Yellow;Slough 09/22/23 1047   Tracy-wound Assessment Fragile; Maceration 09/22/23 1047   Wound Length (cm) 1 cm 09/22/23 1047   Wound Width (cm) 1 cm 09/22/23 1047   Wound Depth (cm) 0.2 cm 09/22/23 1047   Wound Surface Area (cm^2) 1 cm^2 09/22/23 1047   Wound Volume (cm^3) 0.2 cm^3 09/22/23 1047   Calculated Wound Volume (cm^3) 0.2 cm^3 09/22/23 1047   Tunneling 0 cm 09/22/23 1047   Tunneling in depth located at 0 09/22/23 1047   Undermining 0 09/22/23 1047   Undermining is depth extending from 0 09/22/23 1047   Wound Site Closure MICA 09/22/23 1047   Drainage Amount Moderate 09/22/23 1047   Drainage Description Clear 09/22/23 1047   Non-staged Wound Description Full thickness 09/22/23 1047   Treatments Cleansed 09/22/23 1047   Dressing Calcium Alginate;Gauze 09/22/23 1047   Wound packed? No 09/22/23 1047   Packing- # removed 0 09/22/23 1047   Packing- # inserted 0 09/22/23 1047   Dressing Changed New 09/22/23 1047   Patient Tolerance Tolerated well 09/22/23 1047   Dressing Status Clean;Dry; Intact 09/22/23 1047             Megan Newman BSN, RN, [facility]

## 2023-09-22 NOTE — PROGRESS NOTES
[address] [facility]  Progress Note  Name: Maria De Jesus Choudhury  MRN: [id number]  Unit/Bed#: ICU 06 I Date of Admission: 9/13/2023   Date of Service: 9/22/2023 I Hospital Day: 9    Assessment/Plan   Large cell lymphoma [facility])  Assessment & Plan  · Biopsy on 9/17: Large B-cell lymphoma, germinal center B-cell type, c-MYC and BCL-2 double expression. Ki-67 proliferation index is up to 90%; FISH & NGS pending. · Staging work-up: Currently stage II. · First inpatient chemotherapy 9/22/2023- with R-EPOCH. · Inpatient hematology oncology following. Recommendation appreciated. · Outpatient follow-up with hematology oncology. Oropharyngeal mass  Assessment & Plan  · 9/14 Neck/ soft tissue CT: Large enhancing soft tissue mass identified within the left neck involving multiple spaces. This appears to be centered within the left oropharynx with extensions as described above into multiple spaces. This results in significant airway compromise. 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 Small level 3 and level 4 nodes are seen within the neck. · Tracheostomy by ENT, bx & addition testing performed  · Bx: Positive for malignancy, favor poorly differentiated carcinoma. Cannot entirely exclude a high grade large cell lymphoma. Portion of specimen submitted for flow cytometry. · H/o tobacco abuse, daily smoker. · Daughter was updated at bedside. · Preliminary biopsy: Large B-cell lymphoma, germinal center B-cell type, c-MYC and BCL-2 double expression. · First chemotherapy on 9/22. Plan:  · ENT/ Med onc following, appreciated  · Follow final biopsy report  · As needed vent per Trach. Weaning off    * Acute respiratory failure with hypoxia secondary to oropharyngeal mass  Assessment & Plan  • POA with O2 saturate around 80% on room air. Needed high flow 60 L to bring up her O2 saturation to 95%. • Recent hospitalization for respiratory failure second to pneumonia.   • CTA PE study negative for PE. New onset RML PNA. • Persistent partial atelectasis of the lower lobes noted. • Current daily smoker. 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     Plan:  • Tracheostomy 9/17. • Completed Decadron. • Atrovent/Xopenex  • Airway clearance protocol. IS.    RML pneumonia-resolved as of 9/22/2023  Assessment & Plan  · 9/13 CT PE study: Patchy consolidation right middle lobe, new from 8/25/2023, compatible with pneumonia. · No leukocytosis, no fever/chills. · Positive for sore throat, cough. · New onset pneumonia after recent hospitalization and antibiotics treatment. · 9/14: Bronchoscopy/ ABL. · MRSA negative. · ABL revealed 2+ Growth of Candida albicans, suspected contaminant. Plan:  · Zosyn x5 days completed. · Blood cultures neg. · Follow fever curve. (HFpEF) heart failure with preserved ejection fraction [facility])  Assessment & Plan  Wt Readings from Last 3 Encounters:   09/21/23 81.2 kg (179 lb 0.2 oz)   09/07/23 74.6 kg (164 lb 6.4 oz)   08/30/23 73.3 kg (161 lb 9.6 oz)     Lab Results   Component Value Date    LVEF 60 08/28/2023     (H) 09/13/2023     (H) 08/25/2023     • Volume status: Euvolemic. • POA physical (limited): JVD-, HJR-, B/L LE trace to 1+ edema. • Echo 8/28/23: LVEF 60%. D1DD.     Plan:  • CMP, magnesium; Goal Mg > 2 and K > 4; Replete prn  • HOB > 30°, Daily standing weights, Measure I/O    Angioedema  Assessment & Plan  • POA with acute respiratory failure, SOB. • On physical in [place] ([place] ED: Swollen tongue, swelling soft and hard palate and almost the head of all phalanx is completely closed. Severe angioedema. • Decadron 10 mg, Benadryl 25 mg given. • Initially refused but eventually agreed with intubation. • Prior episode of angioedema in 2020 noted. Suspected coadministration of increase the dose of tramadol and lisinopril.   • Per daughter, there is no recent change in medications except Trelegy inhaler, cefdinir. 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 Plan:  • Trach placed 9/17    Ambulatory dysfunction  Assessment & Plan  PT/ OT eval before discharge. Pain syndrome, chronic  Assessment & Plan  · Home regimen: Oxycodone 5 mg twice daily as needed. Tylenol 650 mg every 8 hours as needed. Gabapentin 600 mg 3 times daily. Medical marijuana. · Per daughter, patient was overusing Tylenol over-the-counter. · Pain mostly from lumbar radiculopathy. · Impaired ambulatory dysfunction second to pain. Plan:  · Restarted gabapentin 300 3 times daily, scheduled oxycodone 5 mg 3 times daily, as needed Tylenol 650 mg every 6 hours. As needed fentanyl 50 mcg every 2 hours for severe pain. Hyperlipidemia  Assessment & Plan  Lab Results   Component Value Date    CHOLESTEROL 203 (H) 01/24/2023    CHOLESTEROL 177 08/11/2022    CHOLESTEROL 184 07/08/2020     Lab Results   Component Value Date    HDL 45 (L) 01/24/2023    HDL 37 (L) 08/11/2022    HDL 44 (L) 07/08/2020     Lab Results   Component Value Date    TRIG 166 (H) 09/16/2023    TRIG 160 (H) 01/24/2023    TRIG 108 08/11/2022     Lab Results   Component Value Date    NONHDLC 146 07/12/2018    [address] 207 (H) 04/29/2013     Continue outpatient diet/ lifestyle modification. Benign essential hypertension  Assessment & Plan  · Well controlled at home. · Home meds: Amlodipine 10 mg daily, Coreg 25 mg twice daily, lisinopril 10 mg daily. · Elevated BP may second to anxiety from chemotherapy/new diagnosis lymphoma, chronic pain. Plan:  · Continue home meds for BP controll. · PRN hydralazine if BP >160. · Hydroxyzine as needed. COPD without exacerbation ([address])  Assessment & Plan  Continue vent with nebs. Wean off vent.     Blister (nonthermal) of left forearm, sequela  Assessment & Plan  · 9/17/23: Blisters of LUE noted, no signs of infection  · Daily wound care, monitor signs of infection     CKD (chronic kidney disease) stage 3, GFR 30-59 ml/min [facility])  Assessment & Plan  Lab Results   Component Value Date    EGFR 89 09/22/2023    EGFR 81 09/21/2023    EGFR 80 09/20/2023    CREATININE 0.67 09/22/2023    CREATININE 0.75 09/21/2023    CREATININE 0.76 09/20/2023     Stable. ICU Core Measures     Vented Patient  VAP Bundle  VAP bundle ordered     A: Assess, Prevent, and Manage Pain · Has pain been assessed? Yes  · Need for changes to pain regimen? No   B: Both Spontaneous Awakening Trials (SATs) and Spontaneous Breathing Trials (SBTs) · Plan to perform spontaneous awakening trial today? Yes   · Plan to perform spontaneous breathing trial today? Yes   · Obvious barriers to extubation? No   C: Choice of Sedation · RASS Goal: 0 Alert and Calm  · Need for changes to sedation or analgesia regimen? No   D: Delirium · CAM-ICU: Negative   E: Early Mobility  · Plan for early mobility? Yes   F: Family Engagement · Plan for family engagement today? Yes       Antibiotic Review: Premeds for chemo    Review of Invasive Devices:      Central access plan: Medications requiring central line      Prophylaxis:  VTE VTE covered by:  enoxaparin, Subcutaneous, 40 mg at 09/21/23 0815       Stress Ulcer  covered byfamotidine (PEPCID) tablet 20 mg [[id number]]       Subjective   HPI/24hr events: NGT was pulled out by patient yesterday. Patient was seen and examined at bedside. She was OOA x3 but very concerned/anxious. She was worried about possible side effect/reactions after her first chemo which is scheduled today. BP elevated on her home regimen. Will consider to add on PRN clonidine for HTN and anxiety. Patient pulled out her NG tube as well yesterday. ENT resident was contacted and they will reach out to their attending for PEG tube placement. Ultrasound RUQ was ordered yesterday based on her CT finding and her recent nausea/vomiting episodes. Daily Leyland sign was positive as well. Otherwise patient denied any headache, chest pain. No more N/V/D. Objective                            Vitals I/O      Most Recent Min/Max in 24hrs   Temp 100.3 °F (37.9 °C) Temp  Min: 99.3 °F (37.4 °C)  Max: 100.3 °F (37.9 °C)   Pulse 102 Pulse  Min: 87  Max: 107   Resp (!) 32 Resp  Min: 8  Max: 36   /80 BP  Min: 132/74  Max: 192/88   O2 Sat 97 % SpO2  Min: 84 %  Max: 100 %      Intake/Output Summary (Last 24 hours) at 9/22/2023 0740  Last data filed at 9/21/2023 2000  Gross per 24 hour   Intake 1080 ml   Output 1000 ml   Net 80 ml         Diet Enteral/Parenteral; Tube Feeding No Oral Diet; Vital AF 1.2; Continuous; 40; 100; Water; Every 6 hours     Invasive Monitoring Physical exam    Physical Exam  Eyes:      General: Vision grossly intact. No foreign body in eyeNo visual field deficit. Right eye: No discharge. Left eye: No discharge. Extraocular Movements: Extraocular movements intact. Conjunctiva/sclera: Conjunctivae normal.   Skin:     General: Skin is warm. Capillary Refill: Capillary refill takes less than 2 seconds. Coloration: Skin is not pale. Findings: Wound present. Lesion: Left forearm wrapped with clean gauze. HENT:      Head: Normocephalic and atraumatic. Right Ear: Hearing normal.      Left Ear: Hearing normal.      Nose: No rhinorrhea, epistaxis or nasogastric tube. Mouth/Throat:      Mouth: Mucous membranes are moist. No injury. Neck:      Vascular: No JVD. Trachea: Tracheostomy present. Midline tenderness    Comments: On ventCardiovascular:      Rate and Rhythm: Normal rate and regular rhythm. Pulses: Normal pulses. Heart sounds: Normal heart sounds. Musculoskeletal:         General: No swelling or tenderness. Normal range of motion. Right lower leg: No edema. Left lower leg: No edema. Abdominal:      General: There is no distension. Palpations: Abdomen is soft. Tenderness: There is abdominal tenderness. There is no guarding.    Constitutional:       General: She is not in acute distress. Appearance: She is ill-appearing. Interventions: Cervical collar in place. Comments: Anxious   Pulmonary:      Effort: No accessory muscle usage, respiratory distress or accessory muscle usage. Breath sounds: No wheezing, rhonchi or rales. Chest:      Chest wall: No tenderness. Psychiatric:         Mood and Affect:  mood and affect abnormal.  Neurological:      General: No focal deficit present. Mental Status: She is alert and oriented to person, place and time. She is CAM ICU negative. Cranial Nerves: No facial asymmetry. Motor: Strength full and intact in all extremities. Genitourinary/Anorectal:  external catheter         Diagnostic Studies      EKG: No new EKG. Imaging:   US right upper quadrant   Final Result by Eleanor Connell MD (09/22 9844)      No cholelithiasis. Gallbladder sludge is present with wall thickness upper limits of normal. Negative sonographic Cancino sign. Findings may be sequela of chronic gallbladder dysfunction. Consider follow-up with a HIDA scan if it would alter patient    management. Workstation performed: [id number]         CT abdomen pelvis w contrast   Final Result by Travis Lott MD (09/21 1693)      1. No abdominal lymphadenopathy. 2.  Question gallbladder wall thickening versus motion artifact. If there is clinical concern for gallbladder pathology, ultrasound is recommended. 3.  Chronic T12 lytic lesion with pathologic fracture. Workstation performed: [id number]         MRI brain w wo contrast   Final Result by Lender Buerger, MD (09/21 9845)      No evidence of brain metastases. 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 Asymmetric patchy enhancement and restricted diffusion in the left mastoid. The differential includes neoplastic involvement and infection.          Workstation performed: [id number]         MRI soft tissue neck wo and w contrast   Final Result by Karely South MD (09/21 1005)      Known, large left neck mass described in detail above. Workstation performed: [id number]         XR chest portable   Final Result by Raquel Castellon DO (09/20 1123)      1. Nasogastric tube is seen with its distal portions within the stomach. The tip of the tube courses beyond the inferior margin of the study. 2. Pulmonary vascular congestion with obscuration of the left hemidiaphragm. This is stable from prior radiograph and may represent small left effusion versus consolidation. 3. Right basilar atelectasis versus trace right effusion. Workstation performed: [id number]         XR chest portable   Final Result by Kathy Dorado MD (09/18 1255)   Increased lung markings seen suggest congestion. The left base is obscured   No worsening            Workstation performed: [id number]         CT soft tissue neck w contrast   Final Result by Woo Holm DO (09/14 1644)      Large enhancing soft tissue mass identified within the left neck involving multiple spaces. This appears to be centered within the left oropharynx with extensions as described above into multiple spaces. This results in significant airway compromise. 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 Small level 3 and level 4 nodes are seen within the neck. I personally discussed this study with ICU resident on 9/14/2023 4:44 PM.            Workstation performed: [id number]              I have personally reviewed pertinent reports.    and I have personally reviewed pertinent films in PACS     Medications:  Scheduled PRN   acyclovir, 400 mg, BID  allopurinol, 300 mg, Daily  amLODIPine, 10 mg, Daily  busPIRone, 30 mg, TID  carvedilol, 25 mg, BID With Meals  chlorhexidine, 15 mL, Q12H [address]  [START ON 9/26/2023] cyclophosphamide (CYTOXAN) 1,350 mg in sodium chloride 0.9 % 250 mL IVPB, 750 mg/m2 (Treatment Plan Recorded), Once  DOXOrubicin (ADRIAMYCIN) 18 mg, etoposide (TOPOSAR) 90 mg, vinCRIStine (ONCOVIN) 0.7 mg in sodium chloride 0.9 % 500 mL infusion, , Q24H  enoxaparin, 40 mg, Q24H ASHLEY  famotidine, 20 mg, BID  fluconazole, 400 mg, Daily  fosaprepitant (EMEND) 150 mg in sodium chloride 0.9 % 250 mL IVPB, 150 mg, Once  gabapentin, 300 mg, TID  guaiFENesin, 200 mg, Q6H  levalbuterol, 1.25 mg, Q6H   And  ipratropium, 0.5 mg, Q6H  levofloxacin, 500 mg, Q24H ASHLEY  lisinopril, 10 mg, Daily  nicotine, 21 mg, Daily  nystatin, 500,000 Units, 4x Daily  ondansetron (ZOFRAN) 16 mg, dexamethasone (DECADRON) 10 mg in sodium chloride 0.9 % 50 mL IVPB, , Q24H  oxyCODONE, 5 mg, Q8H ASHLEY  predniSONE, 60 mg/m2 (Treatment Plan Recorded), BID With Meals  senna, 8.8 mg, HS  sertraline, 50 mg, Daily  [START ON 9/26/2023] sodium chloride, 2,000 mL, Once  sulfamethoxazole-trimethoprim, 1 tablet, Once per day on Mon Wed Fri      acetaminophen, 650 mg, Q6H PRN  alteplase, 2 mg, Q1MIN PRN  fentanyl citrate (PF), 50 mcg, Q2H PRN  hydrALAZINE, 5 mg, Q6H PRN  hydrOXYzine HCL, 25 mg, Q6H PRN  levalbuterol, 1.25 mg, Q8H PRN  ondansetron, 4 mg, Q6H PRN  ondansetron, 4 mg, Q8H PRN  oxyCODONE, 2.5 mg, Q4H PRN   Or  oxyCODONE, 5 mg, Q4H PRN  polyethylene glycol, 17 g, Daily PRN  sodium chloride, 20 mL/hr, Daily PRN       Continuous          Labs:    CBC    Recent Labs     09/21/23  0806 09/22/23  0442   WBC 9.94 8.30   HGB 11.4* 10.9*   HCT 35.6 34.0*    175     BMP    Recent Labs     09/21/23  0806 09/22/23  0442   SODIUM 141 139   K 4.0 3.7    103   CO2 27 29   AGAP 9 7   BUN 20 14   CREATININE 0.75 0.67   CALCIUM 9.0 9.2       Coags    No recent results     Additional Electrolytes  Recent Labs     09/21/23  0806 09/22/23  0442   MG  --  1.9   PHOS 2.3 2.1*          Blood Gas    No recent results  No recent results LFTs  Recent Labs     09/21/23  0806 09/22/23  0442   ALT 19 16   AST 20 15   ALKPHOS 65 64   ALB 2. 6* 2.7*   TBILI 0.54 0.59       Infectious  No recent results  Glucose  Recent Labs     09/21/23  0806 09/22/23  0442   GLUC 140 101               Lawrence Palomino MD

## 2023-09-22 NOTE — ASSESSMENT & PLAN NOTE
· 9/14 Neck/ soft tissue CT: Large enhancing soft tissue mass identified within the left neck involving multiple spaces. This appears to be centered within the left oropharynx with extensions as described above into multiple spaces. This results in significant airway compromise. 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 Small level 3 and level 4 nodes are seen within the neck. · Tracheostomy by ENT, bx & addition testing performed  · Bx: Positive for malignancy, favor poorly differentiated carcinoma. Cannot entirely exclude a high grade large cell lymphoma. Portion of specimen submitted for flow cytometry. · H/o tobacco abuse, daily smoker. · Daughter was updated at bedside. · Preliminary biopsy: Large B-cell lymphoma, germinal center B-cell type, c-MYC and BCL-2 double expression. · First chemotherapy on 9/22. Plan:  · ENT/ Med onc following, appreciated  · Follow final biopsy report  · As needed vent per Trach.  Weaning off

## 2023-09-23 ENCOUNTER — APPOINTMENT (INPATIENT)
Dept: VASCULAR ULTRASOUND | Facility: HOSPITAL | Age: 68
DRG: 004 | End: 2023-09-23
Payer: COMMERCIAL

## 2023-09-23 LAB
ALBUMIN SERPL BCP-MCNC: 2.6 G/DL (ref 3.5–5)
ALP SERPL-CCNC: 61 U/L (ref 34–104)
ALT SERPL W P-5'-P-CCNC: 15 U/L (ref 7–52)
ANION GAP SERPL CALCULATED.3IONS-SCNC: 6 MMOL/L
AST SERPL W P-5'-P-CCNC: 14 U/L (ref 13–39)
BASOPHILS # BLD AUTO: 0.02 THOUSANDS/ÂΜL (ref 0–0.1)
BASOPHILS NFR BLD AUTO: 0 % (ref 0–1)
BILIRUB SERPL-MCNC: 0.32 MG/DL (ref 0.2–1)
BUN SERPL-MCNC: 24 MG/DL (ref 5–25)
CALCIUM ALBUM COR SERPL-MCNC: 9.6 MG/DL (ref 8.3–10.1)
CALCIUM SERPL-MCNC: 8.5 MG/DL (ref 8.4–10.2)
CHLORIDE SERPL-SCNC: 104 MMOL/L (ref 96–108)
CO2 SERPL-SCNC: 28 MMOL/L (ref 21–32)
CREAT SERPL-MCNC: 0.71 MG/DL (ref 0.6–1.3)
EOSINOPHIL # BLD AUTO: 0 THOUSAND/ÂΜL (ref 0–0.61)
EOSINOPHIL NFR BLD AUTO: 0 % (ref 0–6)
ERYTHROCYTE [DISTWIDTH] IN BLOOD BY AUTOMATED COUNT: 15.7 % (ref 11.6–15.1)
GFR SERPL CREATININE-BSD FRML MDRD: 87 ML/MIN/1.73SQ M
GLUCOSE SERPL-MCNC: 161 MG/DL (ref 65–140)
HCT VFR BLD AUTO: 32.8 % (ref 34.8–46.1)
HGB BLD-MCNC: 10.6 G/DL (ref 11.5–15.4)
IMM GRANULOCYTES # BLD AUTO: 0.06 THOUSAND/UL (ref 0–0.2)
IMM GRANULOCYTES NFR BLD AUTO: 1 % (ref 0–2)
LDH SERPL-CCNC: 228 U/L (ref 140–271)
LYMPHOCYTES # BLD AUTO: 0.39 THOUSANDS/ÂΜL (ref 0.6–4.47)
LYMPHOCYTES NFR BLD AUTO: 4 % (ref 14–44)
MAGNESIUM SERPL-MCNC: 2.1 MG/DL (ref 1.9–2.7)
MCH RBC QN AUTO: 31.5 PG (ref 26.8–34.3)
MCHC RBC AUTO-ENTMCNC: 32.3 G/DL (ref 31.4–37.4)
MCV RBC AUTO: 98 FL (ref 82–98)
MONOCYTES # BLD AUTO: 0.49 THOUSAND/ÂΜL (ref 0.17–1.22)
MONOCYTES NFR BLD AUTO: 5 % (ref 4–12)
NEUTROPHILS # BLD AUTO: 8.76 THOUSANDS/ÂΜL (ref 1.85–7.62)
NEUTS SEG NFR BLD AUTO: 90 % (ref 43–75)
NRBC BLD AUTO-RTO: 0 /100 WBCS
PHOSPHATE SERPL-MCNC: 2.5 MG/DL (ref 2.3–4.1)
PLATELET # BLD AUTO: 181 THOUSANDS/UL (ref 149–390)
PMV BLD AUTO: 9.9 FL (ref 8.9–12.7)
POTASSIUM SERPL-SCNC: 3.4 MMOL/L (ref 3.5–5.3)
PROT SERPL-MCNC: 5.9 G/DL (ref 6.4–8.4)
RBC # BLD AUTO: 3.36 MILLION/UL (ref 3.81–5.12)
SODIUM SERPL-SCNC: 138 MMOL/L (ref 135–147)
URATE SERPL-MCNC: 2.8 MG/DL (ref 2–7.5)
WBC # BLD AUTO: 9.72 THOUSAND/UL (ref 4.31–10.16)

## 2023-09-23 PROCEDURE — 85025 COMPLETE CBC W/AUTO DIFF WBC: CPT | Performed by: STUDENT IN AN ORGANIZED HEALTH CARE EDUCATION/TRAINING PROGRAM

## 2023-09-23 PROCEDURE — 94760 N-INVAS EAR/PLS OXIMETRY 1: CPT

## 2023-09-23 PROCEDURE — 80053 COMPREHEN METABOLIC PANEL: CPT | Performed by: STUDENT IN AN ORGANIZED HEALTH CARE EDUCATION/TRAINING PROGRAM

## 2023-09-23 PROCEDURE — 94003 VENT MGMT INPAT SUBQ DAY: CPT

## 2023-09-23 PROCEDURE — 83615 LACTATE (LD) (LDH) ENZYME: CPT | Performed by: STUDENT IN AN ORGANIZED HEALTH CARE EDUCATION/TRAINING PROGRAM

## 2023-09-23 PROCEDURE — 94640 AIRWAY INHALATION TREATMENT: CPT

## 2023-09-23 PROCEDURE — 93971 EXTREMITY STUDY: CPT

## 2023-09-23 PROCEDURE — 93971 EXTREMITY STUDY: CPT | Performed by: SURGERY

## 2023-09-23 PROCEDURE — 94150 VITAL CAPACITY TEST: CPT

## 2023-09-23 PROCEDURE — 99233 SBSQ HOSP IP/OBS HIGH 50: CPT | Performed by: INTERNAL MEDICINE

## 2023-09-23 PROCEDURE — 83735 ASSAY OF MAGNESIUM: CPT

## 2023-09-23 PROCEDURE — 99233 SBSQ HOSP IP/OBS HIGH 50: CPT | Performed by: OTOLARYNGOLOGY

## 2023-09-23 PROCEDURE — 84550 ASSAY OF BLOOD/URIC ACID: CPT | Performed by: STUDENT IN AN ORGANIZED HEALTH CARE EDUCATION/TRAINING PROGRAM

## 2023-09-23 PROCEDURE — 84100 ASSAY OF PHOSPHORUS: CPT | Performed by: STUDENT IN AN ORGANIZED HEALTH CARE EDUCATION/TRAINING PROGRAM

## 2023-09-23 RX ORDER — FUROSEMIDE 10 MG/ML
40 INJECTION INTRAMUSCULAR; INTRAVENOUS ONCE
Status: DISCONTINUED | OUTPATIENT
Start: 2023-09-23 | End: 2023-09-23

## 2023-09-23 RX ORDER — FUROSEMIDE 10 MG/ML
40 INJECTION INTRAMUSCULAR; INTRAVENOUS ONCE
Status: COMPLETED | OUTPATIENT
Start: 2023-09-23 | End: 2023-09-23

## 2023-09-23 RX ORDER — POTASSIUM CHLORIDE 20MEQ/15ML
20 LIQUID (ML) ORAL 2 TIMES DAILY
Status: COMPLETED | OUTPATIENT
Start: 2023-09-23 | End: 2023-09-23

## 2023-09-23 RX ADMIN — OXYCODONE HYDROCHLORIDE 5 MG: 5 TABLET ORAL at 05:29

## 2023-09-23 RX ADMIN — GABAPENTIN 300 MG: 300 CAPSULE ORAL at 21:51

## 2023-09-23 RX ADMIN — BUSPIRONE HYDROCHLORIDE 30 MG: 10 TABLET ORAL at 08:06

## 2023-09-23 RX ADMIN — OXYCODONE HYDROCHLORIDE 5 MG: 5 SOLUTION ORAL at 17:13

## 2023-09-23 RX ADMIN — NYSTATIN 500000 UNITS: 100000 SUSPENSION ORAL at 11:56

## 2023-09-23 RX ADMIN — FUROSEMIDE 40 MG: 10 INJECTION, SOLUTION INTRAMUSCULAR; INTRAVENOUS at 10:15

## 2023-09-23 RX ADMIN — IPRATROPIUM BROMIDE 0.5 MG: 0.5 SOLUTION RESPIRATORY (INHALATION) at 13:27

## 2023-09-23 RX ADMIN — FLUCONAZOLE 400 MG: 100 TABLET ORAL at 08:08

## 2023-09-23 RX ADMIN — NICOTINE 21 MG: 21 PATCH, EXTENDED RELEASE TRANSDERMAL at 08:14

## 2023-09-23 RX ADMIN — LEVALBUTEROL HYDROCHLORIDE 1.25 MG: 1.25 SOLUTION RESPIRATORY (INHALATION) at 07:22

## 2023-09-23 RX ADMIN — NYSTATIN 500000 UNITS: 100000 SUSPENSION ORAL at 17:14

## 2023-09-23 RX ADMIN — CHLORHEXIDINE GLUCONATE 15 ML: 1.2 SOLUTION ORAL at 08:07

## 2023-09-23 RX ADMIN — ACYCLOVIR 400 MG: 200 CAPSULE ORAL at 08:07

## 2023-09-23 RX ADMIN — OXYCODONE HYDROCHLORIDE 5 MG: 5 SOLUTION ORAL at 08:05

## 2023-09-23 RX ADMIN — ACYCLOVIR 400 MG: 200 CAPSULE ORAL at 17:16

## 2023-09-23 RX ADMIN — BUSPIRONE HYDROCHLORIDE 30 MG: 10 TABLET ORAL at 23:40

## 2023-09-23 RX ADMIN — NYSTATIN 500000 UNITS: 100000 SUSPENSION ORAL at 21:51

## 2023-09-23 RX ADMIN — LISINOPRIL 20 MG: 20 TABLET ORAL at 08:10

## 2023-09-23 RX ADMIN — ONDANSETRON: 2 INJECTION INTRAMUSCULAR; INTRAVENOUS at 09:27

## 2023-09-23 RX ADMIN — OXYCODONE HYDROCHLORIDE 5 MG: 5 TABLET ORAL at 14:41

## 2023-09-23 RX ADMIN — PREDNISONE 100 MG: 50 TABLET ORAL at 08:07

## 2023-09-23 RX ADMIN — GABAPENTIN 300 MG: 300 CAPSULE ORAL at 17:14

## 2023-09-23 RX ADMIN — IPRATROPIUM BROMIDE 0.5 MG: 0.5 SOLUTION RESPIRATORY (INHALATION) at 20:32

## 2023-09-23 RX ADMIN — POTASSIUM CHLORIDE 20 MEQ: 1.5 SOLUTION ORAL at 17:13

## 2023-09-23 RX ADMIN — OXYCODONE HYDROCHLORIDE 5 MG: 5 TABLET ORAL at 21:51

## 2023-09-23 RX ADMIN — GABAPENTIN 300 MG: 300 CAPSULE ORAL at 08:10

## 2023-09-23 RX ADMIN — CHLORHEXIDINE GLUCONATE 15 ML: 1.2 SOLUTION ORAL at 21:51

## 2023-09-23 RX ADMIN — SERTRALINE HYDROCHLORIDE 50 MG: 50 TABLET ORAL at 08:09

## 2023-09-23 RX ADMIN — FAMOTIDINE 20 MG: 20 TABLET, FILM COATED ORAL at 08:10

## 2023-09-23 RX ADMIN — ETOPOSIDE: 20 INJECTION, SOLUTION INTRAVENOUS at 10:41

## 2023-09-23 RX ADMIN — HYDRALAZINE HYDROCHLORIDE 10 MG: 20 INJECTION, SOLUTION INTRAMUSCULAR; INTRAVENOUS at 11:56

## 2023-09-23 RX ADMIN — ENOXAPARIN SODIUM 40 MG: 40 INJECTION SUBCUTANEOUS at 08:06

## 2023-09-23 RX ADMIN — LEVALBUTEROL HYDROCHLORIDE 1.25 MG: 1.25 SOLUTION RESPIRATORY (INHALATION) at 13:27

## 2023-09-23 RX ADMIN — LEVALBUTEROL HYDROCHLORIDE 1.25 MG: 1.25 SOLUTION RESPIRATORY (INHALATION) at 02:22

## 2023-09-23 RX ADMIN — PREDNISONE 100 MG: 50 TABLET ORAL at 17:16

## 2023-09-23 RX ADMIN — ALLOPURINOL 300 MG: 100 TABLET ORAL at 08:07

## 2023-09-23 RX ADMIN — BUSPIRONE HYDROCHLORIDE 30 MG: 10 TABLET ORAL at 18:10

## 2023-09-23 RX ADMIN — AMLODIPINE BESYLATE 10 MG: 5 TABLET ORAL at 08:08

## 2023-09-23 RX ADMIN — NYSTATIN 500000 UNITS: 100000 SUSPENSION ORAL at 08:09

## 2023-09-23 RX ADMIN — FAMOTIDINE 20 MG: 20 TABLET, FILM COATED ORAL at 17:14

## 2023-09-23 RX ADMIN — LEVOFLOXACIN 500 MG: 250 TABLET, FILM COATED ORAL at 08:09

## 2023-09-23 RX ADMIN — IPRATROPIUM BROMIDE 0.5 MG: 0.5 SOLUTION RESPIRATORY (INHALATION) at 02:22

## 2023-09-23 RX ADMIN — Medication 8.8 MG: at 21:51

## 2023-09-23 RX ADMIN — HYDRALAZINE HYDROCHLORIDE 10 MG: 20 INJECTION, SOLUTION INTRAMUSCULAR; INTRAVENOUS at 19:49

## 2023-09-23 RX ADMIN — SODIUM CHLORIDE 20 ML/HR: 0.9 INJECTION, SOLUTION INTRAVENOUS at 09:00

## 2023-09-23 RX ADMIN — IPRATROPIUM BROMIDE 0.5 MG: 0.5 SOLUTION RESPIRATORY (INHALATION) at 07:22

## 2023-09-23 RX ADMIN — GUAIFENESIN 200 MG: 200 SOLUTION ORAL at 01:58

## 2023-09-23 RX ADMIN — FUROSEMIDE 40 MG: 10 INJECTION, SOLUTION INTRAMUSCULAR; INTRAVENOUS at 17:14

## 2023-09-23 RX ADMIN — POTASSIUM CHLORIDE 20 MEQ: 1.5 SOLUTION ORAL at 08:07

## 2023-09-23 RX ADMIN — LEVALBUTEROL HYDROCHLORIDE 1.25 MG: 1.25 SOLUTION RESPIRATORY (INHALATION) at 20:32

## 2023-09-23 RX ADMIN — HYDRALAZINE HYDROCHLORIDE 10 MG: 20 INJECTION, SOLUTION INTRAMUSCULAR; INTRAVENOUS at 01:56

## 2023-09-23 RX ADMIN — GUAIFENESIN 200 MG: 200 SOLUTION ORAL at 08:11

## 2023-09-23 RX ADMIN — GUAIFENESIN 200 MG: 200 SOLUTION ORAL at 21:51

## 2023-09-23 RX ADMIN — GUAIFENESIN 200 MG: 200 SOLUTION ORAL at 14:41

## 2023-09-23 NOTE — ASSESSMENT & PLAN NOTE
· 9/14 Neck/ soft tissue CT: Large enhancing soft tissue mass identified within the left neck involving multiple spaces. This appears to be centered within the left oropharynx with extensions as described above into multiple spaces. This results in significant airway compromise. 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 Small level 3 and level 4 nodes are seen within the neck. · Tracheostomy by ENT, bx & addition testing performed  · Bx: Positive for malignancy, favor poorly differentiated carcinoma. Cannot entirely exclude a high grade large cell lymphoma. Portion of specimen submitted for flow cytometry. · H/o tobacco abuse, daily smoker. · Daughter was updated at bedside. · Preliminary biopsy: Large B-cell lymphoma, germinal center B-cell type, c-MYC and BCL-2 double expression. · First chemotherapy on 9/22. Plan:  · ENT/ Med onc following, appreciated  · Follow final biopsy report  · As needed vent per Trach. Weaning off  · IV lasix for pulm congestion/ edema.   · Goal off vent x 24 hrs for trach change

## 2023-09-23 NOTE — ASSESSMENT & PLAN NOTE
• POA with acute respiratory failure, SOB. • On physical in [place] ([place]) ED: Swollen tongue, swelling soft and hard palate and almost the head of all phalanx is completely closed. Severe angioedema. • Decadron 10 mg, Benadryl 25 mg given. • Initially refused but eventually agreed with intubation. • Prior episode of angioedema in 2020 noted. Suspected coadministration of increase the dose of tramadol and lisinopril. • Per daughter, there is no recent change in medications except Trelegy inhaler, cefdinir. 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       Plan:  • Trach placed 9/17

## 2023-09-23 NOTE — PROGRESS NOTES
OTOLARYNGOLOGY PROGRESS NOTE    Date of Service: 9/23/2023 10:28 AM    HPI  Patient is a 69 y.o. female w/ recent COVID/pneumonia requiring admission for infectious control earlier this yr, recently dc, [facility] on 9/13 due to SOB and hypoxia, w/ limited oral cavity space observed. Pt agreed to intubation for airway management on 9/14. CT imaging demonstrated L naso/oropharyngeal mass w/ some level 3/4 neck LAD, new R middle lobe patchy consolidation. Daughter at bedside answered questions, including pt is an active tobacco user for many yr, and has complained of worsening difficulty w/ breathing, "gum swelling," and discomfort at back of mouth for past 1.5-2wk. Pt's cause of acute difficult airway was attributed to angioedema due to lisinopril, w/ previous "angioedema" episode in 2020. Ddx being cancer vs infectious etiologies. Overnight Events: POD 7 s/p trach. NGT replaced yesterday morning. Attempted on trach collar this morning, failed trail and now placed back on vent. PHYSICAL EXAM:  Vitals:    09/23/23 0848   BP:    Pulse:    Resp:    Temp:    SpO2: 97%        General: Intubated  HEENT:  7 cuffed proximal XLT tracheostomy in place  Neurology: Cannot assess neurologic fx  Lungs:  On ventilator  Cardio: RRR, well perfused  Abd: soft, NT, ND       Intake/Output Summary (Last 24 hours) at 9/23/2023 1028  Last data filed at 9/23/2023 0800  Gross per 24 hour   Intake 1903.33 ml   Output 925 ml   Net 978.33 ml         LABORATORY    Recent Labs     09/21/23  0806 09/22/23  0442 09/23/23  0529   WBC 9.94 8.30 9.72   HGB 11.4* 10.9* 10.6*   HCT 35.6 34.0* 32.8*    175 181       Recent Labs     09/21/23  0806 09/22/23  0442 09/22/23  2248 09/23/23  0529   K 4.0 3.7 3.6 3.4*    103 104 104   BUN 20 14 23 24   PHOS 2.3 2.1* 3.3 2.5   MG  --  1.9  --  2.1       Invalid input(s): "PTPAT", "PTINR", "APTTMNNM", "APTTPAT"      Patient Active Problem List   Diagnosis   • Benign essential hypertension   • Bipolar I disorder, single manic episode (HCC)   • Disc degeneration, lumbar   • 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   • Hyperlipidemia   • Lumbar radiculopathy   • Myofascial pain syndrome   • Pain syndrome, chronic   • Osteoarthritis of spine with radiculopathy, lumbar region   • Angio-edema, initial encounter   • Abnormal computed tomography angiography (CTA)   • Bipolar disorder, unspecified (HCC)   • Nicotine dependence   • Bone lesion   • CKD (chronic kidney disease) stage 3, GFR 30-59 ml/min (HCC)   • Acute respiratory failure with hypoxia secondary to oropharyngeal mass   • (HFpEF) heart failure with preserved ejection fraction (HCC)   • Pulmonary embolism (HCC)   • Vaginal itching   • Ambulatory dysfunction   • Angioedema   • Oropharyngeal mass   • Blister (nonthermal) of left forearm, sequela   • COPD without exacerbation ([facility])   • Large cell lymphoma ([facility])   • Chemotherapy induced neutropenia ([facility])         ASSESSMENT  Patient is a 69 y.o. female w/ acute and chronic problems as above, w/ naso/oropharyngeal mass observed on CT, likely inducing diminished airway space, requiring intubation for airway protection, now POD 6 s/p trach    PLAN  - biopsy consistent with lymphoma- management per med onc  - wean vent per ICU  - can change to uncuffed trach allowing for improved swallow and PMV once she is no longer requiring vent  - rest of care per primary  - ENT continues following

## 2023-09-23 NOTE — ASSESSMENT & PLAN NOTE
Lab Results   Component Value Date    CHOLESTEROL 203 (H) 01/24/2023    CHOLESTEROL 177 08/11/2022    CHOLESTEROL 184 07/08/2020     Lab Results   Component Value Date    HDL 45 (L) 01/24/2023    HDL 37 (L) 08/11/2022    HDL 44 (L) 07/08/2020     Lab Results   Component Value Date    TRIG 166 (H) 09/16/2023    TRIG 160 (H) 01/24/2023    TRIG 108 08/11/2022     Lab Results   Component Value Date    NONHDLC 146 07/12/2018    [facility] 207 (H) 04/29/2013     Continue outpatient diet/ lifestyle modification.

## 2023-09-23 NOTE — ASSESSMENT & PLAN NOTE
· Biopsy on 9/17: Large B-cell lymphoma, germinal center B-cell type, c-MYC and BCL-2 double expression. Ki-67 proliferation index is up to 90%; FISH & NGS pending. · Staging work-up: Currently stage II. · First inpatient chemotherapy 9/22/2023- with R-EPOCH. Plan:  · Inpatient hematology oncology following. Recommendation appreciated. · Outpatient follow-up with hematology oncology.   · Close TLS workup  · Potential PEG tube

## 2023-09-23 NOTE — ASSESSMENT & PLAN NOTE
· Well controlled at home. · Home meds: Amlodipine 10 mg daily, Coreg 25 mg twice daily, lisinopril 10 mg daily. · Elevated BP may second to anxiety from chemotherapy/new diagnosis lymphoma, chronic pain. Plan:  · Discontinue Coreg 2/2 interactions with chemo regimen. · Amlodipine 10 mg daily. Lisinopril 20 mg daily. · PRN hydralazine if BP >160.

## 2023-09-23 NOTE — ASSESSMENT & PLAN NOTE
Lab Results   Component Value Date    EGFR 80 09/24/2023    EGFR 87 09/23/2023    EGFR 82 09/22/2023    CREATININE 0.76 09/24/2023    CREATININE 0.71 09/23/2023    CREATININE 0.74 09/22/2023     Stable.

## 2023-09-23 NOTE — ASSESSMENT & PLAN NOTE
Wt Readings from Last 3 Encounters:   09/23/23 79.3 kg (174 lb 13.2 oz)   09/07/23 74.6 kg (164 lb 6.4 oz)   08/30/23 73.3 kg (161 lb 9.6 oz)     Lab Results   Component Value Date    LVEF 60 08/28/2023     (H) 09/13/2023     (H) 08/25/2023     • Volume status: Euvolemic. • POA physical (limited): JVD-, HJR-, B/L LE trace to 1+ edema. • Echo 8/28/23: LVEF 60%.  D1DD.     Plan:  • CMP, magnesium; Goal Mg > 2 and K > 4; Replete prn  • HOB > 30°, Daily standing weights, Measure I/O

## 2023-09-23 NOTE — ASSESSMENT & PLAN NOTE
• POA with O2 saturate around 80% on room air. Needed high flow 60 L to bring up her O2 saturation to 95%. • Recent hospitalization for respiratory failure second to pneumonia. • CTA PE study negative for PE. New onset RML PNA. • Persistent partial atelectasis of the lower lobes noted. • Current daily smoker.       Plan:  • Tracheostomy 9/17. • Completed Decadron. • Atrovent/Xopenex  • Airway clearance protocol.  IS.

## 2023-09-23 NOTE — ASSESSMENT & PLAN NOTE
Wt Readings from Last 3 Encounters:   09/24/23 79.9 kg (176 lb 2.4 oz)   09/07/23 74.6 kg (164 lb 6.4 oz)   08/30/23 73.3 kg (161 lb 9.6 oz)     Lab Results   Component Value Date    LVEF 60 08/28/2023     (H) 09/13/2023     (H) 08/25/2023     • Volume status: Euvolemic. • POA physical (limited): JVD-, HJR-, B/L LE trace to 1+ edema. • Echo 8/28/23: LVEF 60%. D1DD. • CXR 9/22: Persistent pulmonary venous congestion with small effusions and bibasilar atelectasis. IV lasix given with even I/O.       Plan:  • CMP, magnesium; Goal Mg > 2 and K > 4; Replete prn  • HOB > 30°, Daily standing weights, Measure I/O  • IV lasix. Goal -1L.

## 2023-09-23 NOTE — ASSESSMENT & PLAN NOTE
Lab Results   Component Value Date    CHOLESTEROL 203 (H) 01/24/2023    CHOLESTEROL 177 08/11/2022    CHOLESTEROL 184 07/08/2020     Lab Results   Component Value Date    HDL 45 (L) 01/24/2023    HDL 37 (L) 08/11/2022    HDL 44 (L) 07/08/2020     Lab Results   Component Value Date    TRIG 166 (H) 09/16/2023    TRIG 160 (H) 01/24/2023    TRIG 108 08/11/2022     Lab Results   Component Value Date    NONHDLC 146 07/12/2018    [facility] 207 (H) 04/29/2013     Continue outpatient diet/ lifestyle modification.

## 2023-09-23 NOTE — PROGRESS NOTES
[address] [facility]  Progress Note  Name: Sameer Brown  MRN: [id number]  Unit/Bed#: ICU 03 I Date of Admission: 9/13/2023   Date of Service: 9/23/2023 I Hospital Day: 10    Assessment/Plan   Large cell lymphoma [facility])  Assessment & Plan  · Biopsy on 9/17: Large B-cell lymphoma, germinal center B-cell type, c-MYC and BCL-2 double expression. Ki-67 proliferation index is up to 90%; FISH & NGS pending. · Staging work-up: Currently stage II. · First inpatient chemotherapy 9/22/2023- with R-EPOCH. Plan:  · Inpatient hematology oncology following. Recommendation appreciated. · Outpatient follow-up with hematology oncology. · Close TLS workup  · Potential PEG tube    Oropharyngeal mass  Assessment & Plan  · 9/14 Neck/ soft tissue CT: Large enhancing soft tissue mass identified within the left neck involving multiple spaces. This appears to be centered within the left oropharynx with extensions as described above into multiple spaces. This results in significant airway compromise. 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 Small level 3 and level 4 nodes are seen within the neck. · Tracheostomy by ENT, bx & addition testing performed  · Bx: Positive for malignancy, favor poorly differentiated carcinoma. Cannot entirely exclude a high grade large cell lymphoma. Portion of specimen submitted for flow cytometry. · H/o tobacco abuse, daily smoker. · Daughter was updated at bedside. · Preliminary biopsy: Large B-cell lymphoma, germinal center B-cell type, c-MYC and BCL-2 double expression. · First chemotherapy on 9/22. Plan:  · ENT/ Med onc following, appreciated  · Follow final biopsy report  · As needed vent per Trach. Weaning off  · IV lasix for pulm congestion/ edema. · Goal off vent x 24 hrs for trach change    * Acute respiratory failure with hypoxia secondary to oropharyngeal mass  Assessment & Plan  • POA with O2 saturate around 80% on room air.   Needed high flow 60 L to bring up her O2 saturation to 95%. • Recent hospitalization for respiratory failure second to pneumonia. • CTA PE study negative for PE. New onset RML PNA. • Persistent partial atelectasis of the lower lobes noted. • Current daily smoker. 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     Plan:  • Tracheostomy 9/17. • Completed Decadron. • Atrovent/Xopenex  • Airway clearance protocol. IS.    (HFpEF) heart failure with preserved ejection fraction [facility])  Assessment & Plan  Wt Readings from Last 3 Encounters:   09/23/23 79.3 kg (174 lb 13.2 oz)   09/07/23 74.6 kg (164 lb 6.4 oz)   08/30/23 73.3 kg (161 lb 9.6 oz)     Lab Results   Component Value Date    LVEF 60 08/28/2023     (H) 09/13/2023     (H) 08/25/2023     • Volume status: Euvolemic. • POA physical (limited): JVD-, HJR-, B/L LE trace to 1+ edema. • Echo 8/28/23: LVEF 60%. D1DD.     Plan:  • CMP, magnesium; Goal Mg > 2 and K > 4; Replete prn  • HOB > 30°, Daily standing weights, Measure I/O    Angioedema  Assessment & Plan  • POA with acute respiratory failure, SOB. • On physical in [place] ED: Swollen tongue, swelling soft and hard palate and almost the head of all phalanx is completely closed. Severe angioedema. • Decadron 10 mg, Benadryl 25 mg given. • Initially refused but eventually agreed with intubation. • Prior episode of angioedema in 2020 noted. Suspected coadministration of increase the dose of tramadol and lisinopril. • Per daughter, there is no recent change in medications except Trelegy inhaler, cefdinir. 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 Plan:  • Trach placed 9/17    Ambulatory dysfunction  Assessment & Plan  PT/ OT eval before discharge. Pain syndrome, chronic  Assessment & Plan  · Home regimen: Oxycodone 5 mg twice daily as needed. Tylenol 650 mg every 8 hours as needed. Gabapentin 600 mg 3 times daily. Medical marijuana.   · Per daughter, patient was overusing Tylenol over-the-counter. · Pain mostly from lumbar radiculopathy. · Impaired ambulatory dysfunction second to pain. Plan:  · Restarted gabapentin 300 3 times daily, scheduled oxycodone 5 mg 3 times daily, as needed Tylenol 650 mg every 6 hours. As needed fentanyl 50 mcg every 2 hours for severe pain. Hyperlipidemia  Assessment & Plan  Lab Results   Component Value Date    CHOLESTEROL 203 (H) 01/24/2023    CHOLESTEROL 177 08/11/2022    CHOLESTEROL 184 07/08/2020     Lab Results   Component Value Date    HDL 45 (L) 01/24/2023    HDL 37 (L) 08/11/2022    HDL 44 (L) 07/08/2020     Lab Results   Component Value Date    TRIG 166 (H) 09/16/2023    TRIG 160 (H) 01/24/2023    TRIG 108 08/11/2022     Lab Results   Component Value Date    NONHDLC 146 07/12/2018    [address] 207 (H) 04/29/2013     Continue outpatient diet/ lifestyle modification. Benign essential hypertension  Assessment & Plan  · Well controlled at home. · Home meds: Amlodipine 10 mg daily, Coreg 25 mg twice daily, lisinopril 10 mg daily. · Elevated BP may second to anxiety from chemotherapy/new diagnosis lymphoma, chronic pain. Plan:  · Continue home meds for BP controll. · PRN hydralazine if BP >160. · Hydroxyzine as needed. COPD without exacerbation ([address])  Assessment & Plan  Continue vent with nebs. Wean off vent. Blister (nonthermal) of left forearm, sequela  Assessment & Plan  · 9/17/23: Blisters of LUE noted, no signs of infection  · Daily wound care, monitor signs of infection     CKD (chronic kidney disease) stage 3, GFR 30-59 ml/min [facility])  Assessment & Plan  Lab Results   Component Value Date    EGFR 87 09/23/2023    EGFR 82 09/22/2023    EGFR 89 09/22/2023    CREATININE 0.71 09/23/2023    CREATININE 0.74 09/22/2023    CREATININE 0.67 09/22/2023     Stable. ICU Core Measures     Vented Patient  VAP Bundle  VAP bundle ordered     A: Assess, Prevent, and Manage Pain · Has pain been assessed? Yes  · Need for changes to pain regimen? No   B: Both Spontaneous Awakening Trials (SATs) and Spontaneous Breathing Trials (SBTs) · Plan to perform spontaneous awakening trial today? N/A   · Plan to perform spontaneous breathing trial today? Yes   · Obvious barriers to extubation? No   C: Choice of Sedation · RASS Goal: 0 Alert and Calm  · Need for changes to sedation or analgesia regimen? No   D: Delirium · CAM-ICU: Negative   E: Early Mobility  · Plan for early mobility? Yes   F: Family Engagement · Plan for family engagement today? Yes       Antibiotic Review: prechemo prevention    Review of Invasive Devices:      Central access plan: Medications requiring central line      Prophylaxis:  VTE VTE covered by:  enoxaparin, Subcutaneous, 40 mg at 09/22/23 4417       Stress Ulcer  covered byfamotidine (PEPCID) tablet 20 mg [[id number]]       Subjective   HPI/24hr events: Receiving first dose of chemo    Patient was seen and examined at bedside. She was OOA x3 and not in acute distress. So far she feels comfortable with no reactions. Denied nausea, vomiting, diarrhea, numbness, tingling, weakness. X-ray chest yesterday showed pulmonology vascular congestion. 20 mg IV Lasix given but did not have a lot of urine output. May challenge her another 40 mg IV Lasix today. Lab work for possible tumor lysis syndrome so far negative. Objective                            Vitals I/O      Most Recent Min/Max in 24hrs   Temp 98.5 °F (36.9 °C) Temp  Min: 97.4 °F (36.3 °C)  Max: 98.6 °F (37 °C)   Pulse 93 Pulse  Min: 76  Max: 109   Resp 16 Resp  Min: 11  Max: 29   BP (!) 173/81 BP  Min: 115/66  Max: 219/107   O2 Sat 91 % SpO2  Min: 79 %  Max: 100 %      Intake/Output Summary (Last 24 hours) at 9/23/2023 0745  Last data filed at 9/23/2023 0600  Gross per 24 hour   Intake 1777.33 ml   Output 975 ml   Net 802.33 ml         Diet Enteral/Parenteral; Tube Feeding No Oral Diet; Vital AF 1.2; Continuous; 40; 100;  Water; Every 6 hours     Invasive Monitoring Physical exam    Physical Exam  Eyes:      General: Vision grossly intact. No foreign body in eye        Right eye: No discharge. Left eye: No discharge. Extraocular Movements: Extraocular movements intact. Conjunctiva/sclera: Conjunctivae normal.   Skin:     General: Skin is warm. Capillary Refill: Capillary refill takes less than 2 seconds. Coloration: Skin is not pale. HENT:      Head: Normocephalic and atraumatic. Right Ear: Hearing normal.      Left Ear: Hearing normal.      Nose: Nasogastric tube present. No rhinorrhea or epistaxis. Mouth/Throat:      Mouth: Mucous membranes are moist. No injury. Neck:      Vascular: No JVD. Trachea: Tracheostomy present. Midline tenderness Cardiovascular:      Rate and Rhythm: Normal rate and regular rhythm. Pulses: Normal pulses. Heart sounds: Normal heart sounds. Musculoskeletal:         General: No swelling or tenderness. Normal range of motion. Right lower leg: No edema. Left lower leg: No edema. Abdominal:      General: There is no distension. Palpations: Abdomen is soft. Tenderness: There is no abdominal tenderness. There is no guarding. Constitutional:       General: She is not in acute distress. Appearance: She is ill-appearing. Pulmonary:      Effort: No accessory muscle usage, respiratory distress or accessory muscle usage. Breath sounds: No wheezing, rhonchi or rales. Chest:      Chest wall: No tenderness. Psychiatric:         Mood and Affect:  mood and affect abnormal.  Neurological:      General: No focal deficit present. Mental Status: She is alert and oriented to person, place and time. She is CAM ICU negative. Cranial Nerves: No facial asymmetry. Motor: Strength full and intact in all extremities.             Diagnostic Studies      EKG: No new EKG  Imaging:   XR chest portable ICU   Final Result by Ghulam Olmos Mali Morrison MD (09/22 1007)      Persistent pulmonary venous congestion with small effusions and bibasilar atelectasis. Workstation performed: [id number]         US right upper quadrant   Final Result by Anny Zamudio MD (09/22 0630)      No cholelithiasis. Gallbladder sludge is present with wall thickness upper limits of normal. Negative sonographic Cancino sign. Findings may be sequela of chronic gallbladder dysfunction. Consider follow-up with a HIDA scan if it would alter patient    management. Workstation performed: [id number]         CT abdomen pelvis w contrast   Final Result by Tatianna Alarcon MD (09/21 1555)      1. No abdominal lymphadenopathy. 2.  Question gallbladder wall thickening versus motion artifact. If there is clinical concern for gallbladder pathology, ultrasound is recommended. 3.  Chronic T12 lytic lesion with pathologic fracture. Workstation performed: [id number]         MRI brain w wo contrast   Final Result by Fidel Oliveira MD (09/21 1170)      No evidence of brain metastases. 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 Asymmetric patchy enhancement and restricted diffusion in the left mastoid. The differential includes neoplastic involvement and infection. Workstation performed: [id number]         MRI soft tissue neck wo and w contrast   Final Result by Fidel Oliveira MD (09/21 1005)      Known, large left neck mass described in detail above. Workstation performed: [id number]         XR chest portable   Final Result by Sobia Ferris DO (09/20 1123)      1. Nasogastric tube is seen with its distal portions within the stomach. The tip of the tube courses beyond the inferior margin of the study. 2. Pulmonary vascular congestion with obscuration of the left hemidiaphragm.  This is stable from prior radiograph and may represent small left effusion versus consolidation. 3. Right basilar atelectasis versus trace right effusion. Workstation performed: [id number]         XR chest portable   Final Result by Gloria Hernandez MD (09/18 1255)   Increased lung markings seen suggest congestion. The left base is obscured   No worsening            Workstation performed: [id number]         CT soft tissue neck w contrast   Final Result by Woo Cho DO (09/14 1644)      Large enhancing soft tissue mass identified within the left neck involving multiple spaces. This appears to be centered within the left oropharynx with extensions as described above into multiple spaces. This results in significant airway compromise. 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 Small level 3 and level 4 nodes are seen within the neck. I personally discussed this study with ICU resident on 9/14/2023 4:44 PM.            Workstation performed: [id number]              I have personally reviewed pertinent reports.    and I have personally reviewed pertinent films in PACS     Medications:  Scheduled PRN   acyclovir, 400 mg, BID  allopurinol, 300 mg, Daily  amLODIPine, 10 mg, Daily  busPIRone, 30 mg, TID  chlorhexidine, 15 mL, Q12H [address]  [START ON 9/26/2023] cyclophosphamide (CYTOXAN) 1,350 mg in sodium chloride 0.9 % 250 mL IVPB, 750 mg/m2 (Treatment Plan Recorded), Once  DOXOrubicin (ADRIAMYCIN) 18 mg, etoposide (TOPOSAR) 90 mg, vinCRIStine (ONCOVIN) 0.7 mg in sodium chloride 0.9 % 500 mL infusion, , Q24H  enoxaparin, 40 mg, Q24H ASHLEY  famotidine, 20 mg, BID  fluconazole, 400 mg, Daily  gabapentin, 300 mg, TID  guaiFENesin, 200 mg, Q6H  levalbuterol, 1.25 mg, Q6H   And  ipratropium, 0.5 mg, Q6H  levofloxacin, 500 mg, Q24H ASHLEY  lisinopril, 20 mg, Daily  nicotine, 21 mg, Daily  nystatin, 500,000 Units, 4x Daily  ondansetron (ZOFRAN) 16 mg, dexamethasone (DECADRON) 10 mg in sodium chloride 0.9 % 50 mL IVPB, , Q24H  oxyCODONE, 5 mg, Q8H [facility]  potassium chloride, 20 mEq, BID  predniSONE, 60 mg/m2 (Treatment Plan Recorded), BID With Meals  senna, 8.8 mg, HS  sertraline, 50 mg, Daily  [START ON 9/26/2023] sodium chloride, 2,000 mL, Once  sulfamethoxazole-trimethoprim, 1 tablet, Once per day on Mon Wed Fri      acetaminophen, 650 mg, Q6H PRN  alteplase, 2 mg, Q1MIN PRN  fentanyl citrate (PF), 50 mcg, Q2H PRN  hydrALAZINE, 10 mg, Q6H PRN  levalbuterol, 1.25 mg, Q8H PRN  ondansetron, 4 mg, Q6H PRN  ondansetron, 4 mg, Q8H PRN  oxyCODONE, 2.5 mg, Q4H PRN   Or  oxyCODONE, 5 mg, Q4H PRN  polyethylene glycol, 17 g, Daily PRN  sodium chloride, 20 mL/hr, Daily PRN       Continuous          Labs:    CBC    Recent Labs     09/22/23 0442 09/23/23  0529   WBC 8.30 9.72   HGB 10.9* 10.6*   HCT 34.0* 32.8*    181     BMP    Recent Labs     09/22/23 2248 09/23/23  0529   SODIUM 138 138   K 3.6 3.4*    104   CO2 27 28   AGAP 7 6   BUN 23 24   CREATININE 0.74 0.71   CALCIUM 8.4 8.5       Coags    No recent results     Additional Electrolytes  Recent Labs     09/22/23 0442 09/22/23 2248 09/23/23  0529   MG 1.9  --  2.1   PHOS 2.1* 3.3 2.5          Blood Gas    No recent results  No recent results LFTs  Recent Labs     09/22/23 0442 09/23/23  0529   ALT 16 15   AST 15 14   ALKPHOS 64 61   ALB 2.7* 2.6*   TBILI 0.59 0.32       Infectious  No recent results  Glucose  Recent Labs     09/21/23  0806 09/22/23 0442 09/22/23 2248 09/23/23  0529   GLUC 140 101 129 161*               Yareli Stone MD

## 2023-09-23 NOTE — ASSESSMENT & PLAN NOTE
· Biopsy on 9/17: Large B-cell lymphoma, germinal center B-cell type, c-MYC and BCL-2 double expression. Ki-67 proliferation index is up to 90%; FISH & NGS pending. · Staging work-up: Currently stage II. · First inpatient chemotherapy 9/22/2023- with R-EPOCH. Plan:  · Inpatient hematology oncology following. Recommendation appreciated. · Outpatient follow-up with hematology oncology.   · Close TLS workup  · PEG tube per ENT

## 2023-09-23 NOTE — ASSESSMENT & PLAN NOTE
Lab Results   Component Value Date    EGFR 87 09/23/2023    EGFR 82 09/22/2023    EGFR 89 09/22/2023    CREATININE 0.71 09/23/2023    CREATININE 0.74 09/22/2023    CREATININE 0.67 09/22/2023     Stable.

## 2023-09-23 NOTE — ASSESSMENT & PLAN NOTE
• POA with acute respiratory failure, SOB. • [facility]: Swollen tongue, swelling soft and hard palate and almost the head of all phalanx is completely closed. Severe angioedema. • Decadron 10 mg, Benadryl 25 mg given. • Initially refused but eventually agreed with intubation. • Prior episode of angioedema in 2020 noted. Suspected coadministration of increase the dose of tramadol and lisinopril. • Per daughter, there is no recent change in medications except Trelegy inhaler, cefdinir. 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       Plan:  • Trach placed 9/17

## 2023-09-24 PROBLEM — I80.9 SUPERFICIAL THROMBOPHLEBITIS: Status: ACTIVE | Noted: 2023-09-24

## 2023-09-24 LAB
ALBUMIN SERPL BCP-MCNC: 2.7 G/DL (ref 3.5–5)
ALP SERPL-CCNC: 58 U/L (ref 34–104)
ALT SERPL W P-5'-P-CCNC: 17 U/L (ref 7–52)
ANION GAP SERPL CALCULATED.3IONS-SCNC: 7 MMOL/L
AST SERPL W P-5'-P-CCNC: 15 U/L (ref 13–39)
BASOPHILS # BLD MANUAL: 0 THOUSAND/UL (ref 0–0.1)
BASOPHILS NFR MAR MANUAL: 0 % (ref 0–1)
BILIRUB SERPL-MCNC: 0.3 MG/DL (ref 0.2–1)
BUN SERPL-MCNC: 28 MG/DL (ref 5–25)
CA-I BLD-SCNC: 1.19 MMOL/L (ref 1.12–1.32)
CALCIUM ALBUM COR SERPL-MCNC: 9.8 MG/DL (ref 8.3–10.1)
CALCIUM SERPL-MCNC: 8.8 MG/DL (ref 8.4–10.2)
CHLORIDE SERPL-SCNC: 104 MMOL/L (ref 96–108)
CO2 SERPL-SCNC: 29 MMOL/L (ref 21–32)
CREAT SERPL-MCNC: 0.76 MG/DL (ref 0.6–1.3)
EOSINOPHIL # BLD MANUAL: 0 THOUSAND/UL (ref 0–0.4)
EOSINOPHIL NFR BLD MANUAL: 0 % (ref 0–6)
ERYTHROCYTE [DISTWIDTH] IN BLOOD BY AUTOMATED COUNT: 15.7 % (ref 11.6–15.1)
GFR SERPL CREATININE-BSD FRML MDRD: 80 ML/MIN/1.73SQ M
GLUCOSE SERPL-MCNC: 163 MG/DL (ref 65–140)
HCT VFR BLD AUTO: 34.2 % (ref 34.8–46.1)
HGB BLD-MCNC: 11 G/DL (ref 11.5–15.4)
LDH SERPL-CCNC: 261 U/L (ref 140–271)
LYMPHOCYTES # BLD AUTO: 0.56 THOUSAND/UL (ref 0.6–4.47)
LYMPHOCYTES # BLD AUTO: 4 % (ref 14–44)
MAGNESIUM SERPL-MCNC: 2 MG/DL (ref 1.9–2.7)
MCH RBC QN AUTO: 31.6 PG (ref 26.8–34.3)
MCHC RBC AUTO-ENTMCNC: 32.2 G/DL (ref 31.4–37.4)
MCV RBC AUTO: 98 FL (ref 82–98)
MONOCYTES # BLD AUTO: 0 THOUSAND/UL (ref 0–1.22)
MONOCYTES NFR BLD: 0 % (ref 4–12)
NEUTROPHILS # BLD MANUAL: 13.39 THOUSAND/UL (ref 1.85–7.62)
NEUTS BAND NFR BLD MANUAL: 2 % (ref 0–8)
NEUTS SEG NFR BLD AUTO: 94 % (ref 43–75)
PHOSPHATE SERPL-MCNC: 2.1 MG/DL (ref 2.3–4.1)
PLATELET # BLD AUTO: 221 THOUSANDS/UL (ref 149–390)
PLATELET BLD QL SMEAR: ADEQUATE
PMV BLD AUTO: 9.8 FL (ref 8.9–12.7)
POTASSIUM SERPL-SCNC: 3.6 MMOL/L (ref 3.5–5.3)
PROT SERPL-MCNC: 6.1 G/DL (ref 6.4–8.4)
RBC # BLD AUTO: 3.48 MILLION/UL (ref 3.81–5.12)
RBC MORPH BLD: NORMAL
SODIUM SERPL-SCNC: 140 MMOL/L (ref 135–147)
URATE SERPL-MCNC: 2.7 MG/DL (ref 2–7.5)
WBC # BLD AUTO: 13.95 THOUSAND/UL (ref 4.31–10.16)

## 2023-09-24 PROCEDURE — 83615 LACTATE (LD) (LDH) ENZYME: CPT | Performed by: STUDENT IN AN ORGANIZED HEALTH CARE EDUCATION/TRAINING PROGRAM

## 2023-09-24 PROCEDURE — 80053 COMPREHEN METABOLIC PANEL: CPT | Performed by: STUDENT IN AN ORGANIZED HEALTH CARE EDUCATION/TRAINING PROGRAM

## 2023-09-24 PROCEDURE — 84550 ASSAY OF BLOOD/URIC ACID: CPT | Performed by: STUDENT IN AN ORGANIZED HEALTH CARE EDUCATION/TRAINING PROGRAM

## 2023-09-24 PROCEDURE — 99233 SBSQ HOSP IP/OBS HIGH 50: CPT | Performed by: INTERNAL MEDICINE

## 2023-09-24 PROCEDURE — 84100 ASSAY OF PHOSPHORUS: CPT | Performed by: STUDENT IN AN ORGANIZED HEALTH CARE EDUCATION/TRAINING PROGRAM

## 2023-09-24 PROCEDURE — 94760 N-INVAS EAR/PLS OXIMETRY 1: CPT

## 2023-09-24 PROCEDURE — 85027 COMPLETE CBC AUTOMATED: CPT | Performed by: STUDENT IN AN ORGANIZED HEALTH CARE EDUCATION/TRAINING PROGRAM

## 2023-09-24 PROCEDURE — 94003 VENT MGMT INPAT SUBQ DAY: CPT

## 2023-09-24 PROCEDURE — 82330 ASSAY OF CALCIUM: CPT

## 2023-09-24 PROCEDURE — 94640 AIRWAY INHALATION TREATMENT: CPT

## 2023-09-24 PROCEDURE — 83735 ASSAY OF MAGNESIUM: CPT

## 2023-09-24 PROCEDURE — 94002 VENT MGMT INPAT INIT DAY: CPT

## 2023-09-24 PROCEDURE — 85007 BL SMEAR W/DIFF WBC COUNT: CPT | Performed by: STUDENT IN AN ORGANIZED HEALTH CARE EDUCATION/TRAINING PROGRAM

## 2023-09-24 PROCEDURE — 94150 VITAL CAPACITY TEST: CPT

## 2023-09-24 RX ORDER — HYDRALAZINE HYDROCHLORIDE 20 MG/ML
10 INJECTION INTRAMUSCULAR; INTRAVENOUS EVERY 4 HOURS PRN
Status: DISCONTINUED | OUTPATIENT
Start: 2023-09-24 | End: 2023-10-04

## 2023-09-24 RX ORDER — POTASSIUM CHLORIDE 20MEQ/15ML
20 LIQUID (ML) ORAL ONCE
Status: COMPLETED | OUTPATIENT
Start: 2023-09-24 | End: 2023-09-24

## 2023-09-24 RX ORDER — OXYCODONE HCL 5 MG/5 ML
5 SOLUTION, ORAL ORAL EVERY 8 HOURS
Status: DISCONTINUED | OUTPATIENT
Start: 2023-09-24 | End: 2023-10-11

## 2023-09-24 RX ORDER — LABETALOL HYDROCHLORIDE 5 MG/ML
10 INJECTION, SOLUTION INTRAVENOUS ONCE
Status: COMPLETED | OUTPATIENT
Start: 2023-09-24 | End: 2023-09-24

## 2023-09-24 RX ORDER — FUROSEMIDE 10 MG/ML
40 INJECTION INTRAMUSCULAR; INTRAVENOUS ONCE
Status: COMPLETED | OUTPATIENT
Start: 2023-09-24 | End: 2023-09-24

## 2023-09-24 RX ORDER — LABETALOL HYDROCHLORIDE 5 MG/ML
10 INJECTION, SOLUTION INTRAVENOUS ONCE
Status: DISCONTINUED | OUTPATIENT
Start: 2023-09-24 | End: 2023-09-24

## 2023-09-24 RX ADMIN — IPRATROPIUM BROMIDE 0.5 MG: 0.5 SOLUTION RESPIRATORY (INHALATION) at 07:45

## 2023-09-24 RX ADMIN — GUAIFENESIN 200 MG: 200 SOLUTION ORAL at 14:08

## 2023-09-24 RX ADMIN — LEVALBUTEROL HYDROCHLORIDE 1.25 MG: 1.25 SOLUTION RESPIRATORY (INHALATION) at 13:56

## 2023-09-24 RX ADMIN — OXYCODONE HYDROCHLORIDE 5 MG: 5 SOLUTION ORAL at 21:17

## 2023-09-24 RX ADMIN — BUSPIRONE HYDROCHLORIDE 30 MG: 10 TABLET ORAL at 16:58

## 2023-09-24 RX ADMIN — GUAIFENESIN 200 MG: 200 SOLUTION ORAL at 07:36

## 2023-09-24 RX ADMIN — BUSPIRONE HYDROCHLORIDE 30 MG: 10 TABLET ORAL at 21:17

## 2023-09-24 RX ADMIN — FUROSEMIDE 40 MG: 10 INJECTION, SOLUTION INTRAMUSCULAR; INTRAVENOUS at 16:58

## 2023-09-24 RX ADMIN — OXYCODONE HYDROCHLORIDE 5 MG: 5 TABLET ORAL at 14:08

## 2023-09-24 RX ADMIN — GABAPENTIN 300 MG: 300 CAPSULE ORAL at 16:58

## 2023-09-24 RX ADMIN — DIBASIC SODIUM PHOSPHATE, MONOBASIC POTASSIUM PHOSPHATE AND MONOBASIC SODIUM PHOSPHATE 1 TABLET: 852; 155; 130 TABLET ORAL at 07:36

## 2023-09-24 RX ADMIN — POTASSIUM CHLORIDE 20 MEQ: 1.5 SOLUTION ORAL at 08:48

## 2023-09-24 RX ADMIN — IPRATROPIUM BROMIDE 0.5 MG: 0.5 SOLUTION RESPIRATORY (INHALATION) at 20:30

## 2023-09-24 RX ADMIN — CHLORHEXIDINE GLUCONATE 15 ML: 1.2 SOLUTION ORAL at 08:48

## 2023-09-24 RX ADMIN — NICOTINE 21 MG: 21 PATCH, EXTENDED RELEASE TRANSDERMAL at 08:49

## 2023-09-24 RX ADMIN — OXYCODONE HYDROCHLORIDE 5 MG: 5 TABLET ORAL at 05:54

## 2023-09-24 RX ADMIN — FAMOTIDINE 20 MG: 20 TABLET, FILM COATED ORAL at 17:01

## 2023-09-24 RX ADMIN — LEVALBUTEROL HYDROCHLORIDE 1.25 MG: 1.25 SOLUTION RESPIRATORY (INHALATION) at 20:30

## 2023-09-24 RX ADMIN — HYDRALAZINE HYDROCHLORIDE 10 MG: 20 INJECTION, SOLUTION INTRAMUSCULAR; INTRAVENOUS at 02:12

## 2023-09-24 RX ADMIN — PREDNISONE 100 MG: 50 TABLET ORAL at 07:36

## 2023-09-24 RX ADMIN — IPRATROPIUM BROMIDE 0.5 MG: 0.5 SOLUTION RESPIRATORY (INHALATION) at 13:56

## 2023-09-24 RX ADMIN — AMLODIPINE BESYLATE 10 MG: 5 TABLET ORAL at 08:49

## 2023-09-24 RX ADMIN — LEVALBUTEROL HYDROCHLORIDE 1.25 MG: 1.25 SOLUTION RESPIRATORY (INHALATION) at 07:45

## 2023-09-24 RX ADMIN — POLYETHYLENE GLYCOL 3350 17 G: 17 POWDER, FOR SOLUTION ORAL at 11:21

## 2023-09-24 RX ADMIN — SODIUM CHLORIDE 20 ML/HR: 0.9 INJECTION, SOLUTION INTRAVENOUS at 08:37

## 2023-09-24 RX ADMIN — HYDRALAZINE HYDROCHLORIDE 10 MG: 20 INJECTION, SOLUTION INTRAMUSCULAR; INTRAVENOUS at 22:52

## 2023-09-24 RX ADMIN — LEVALBUTEROL HYDROCHLORIDE 1.25 MG: 1.25 SOLUTION RESPIRATORY (INHALATION) at 01:27

## 2023-09-24 RX ADMIN — ONDANSETRON: 2 INJECTION INTRAMUSCULAR; INTRAVENOUS at 08:37

## 2023-09-24 RX ADMIN — GABAPENTIN 300 MG: 300 CAPSULE ORAL at 08:49

## 2023-09-24 RX ADMIN — ENOXAPARIN SODIUM 40 MG: 40 INJECTION SUBCUTANEOUS at 08:48

## 2023-09-24 RX ADMIN — HYDRALAZINE HYDROCHLORIDE 10 MG: 20 INJECTION, SOLUTION INTRAMUSCULAR; INTRAVENOUS at 11:22

## 2023-09-24 RX ADMIN — NYSTATIN 500000 UNITS: 100000 SUSPENSION ORAL at 17:01

## 2023-09-24 RX ADMIN — ACYCLOVIR 400 MG: 200 CAPSULE ORAL at 17:01

## 2023-09-24 RX ADMIN — NYSTATIN 500000 UNITS: 100000 SUSPENSION ORAL at 11:21

## 2023-09-24 RX ADMIN — ALLOPURINOL 300 MG: 100 TABLET ORAL at 08:49

## 2023-09-24 RX ADMIN — BUSPIRONE HYDROCHLORIDE 30 MG: 10 TABLET ORAL at 09:49

## 2023-09-24 RX ADMIN — FLUCONAZOLE 400 MG: 100 TABLET ORAL at 08:49

## 2023-09-24 RX ADMIN — IPRATROPIUM BROMIDE 0.5 MG: 0.5 SOLUTION RESPIRATORY (INHALATION) at 01:27

## 2023-09-24 RX ADMIN — LEVOFLOXACIN 500 MG: 250 TABLET, FILM COATED ORAL at 08:49

## 2023-09-24 RX ADMIN — LISINOPRIL 20 MG: 20 TABLET ORAL at 08:48

## 2023-09-24 RX ADMIN — GUAIFENESIN 200 MG: 200 SOLUTION ORAL at 02:12

## 2023-09-24 RX ADMIN — NYSTATIN 500000 UNITS: 100000 SUSPENSION ORAL at 08:48

## 2023-09-24 RX ADMIN — FAMOTIDINE 20 MG: 20 TABLET, FILM COATED ORAL at 08:49

## 2023-09-24 RX ADMIN — GUAIFENESIN 200 MG: 200 SOLUTION ORAL at 21:17

## 2023-09-24 RX ADMIN — GABAPENTIN 300 MG: 300 CAPSULE ORAL at 21:17

## 2023-09-24 RX ADMIN — ETOPOSIDE: 20 INJECTION, SOLUTION INTRAVENOUS at 10:32

## 2023-09-24 RX ADMIN — PREDNISONE 100 MG: 50 TABLET ORAL at 16:58

## 2023-09-24 RX ADMIN — SERTRALINE HYDROCHLORIDE 50 MG: 50 TABLET ORAL at 08:49

## 2023-09-24 RX ADMIN — NYSTATIN 500000 UNITS: 100000 SUSPENSION ORAL at 21:17

## 2023-09-24 RX ADMIN — HYDRALAZINE HYDROCHLORIDE 10 MG: 20 INJECTION, SOLUTION INTRAMUSCULAR; INTRAVENOUS at 17:12

## 2023-09-24 RX ADMIN — CHLORHEXIDINE GLUCONATE 15 ML: 1.2 SOLUTION ORAL at 21:17

## 2023-09-24 RX ADMIN — Medication 10 MG: at 14:33

## 2023-09-24 RX ADMIN — ACYCLOVIR 400 MG: 200 CAPSULE ORAL at 08:49

## 2023-09-24 NOTE — PROGRESS NOTES
[id number]  Progress Note  Name: Dion Sahu  MRN: [id number]  Unit/Bed#: ICU 03 I Date of Admission: 9/13/2023   Date of Service: 9/24/2023 I Hospital Day: 11    Assessment/Plan   Large cell lymphoma ([address])  Assessment & Plan  · Biopsy on 9/17: Large B-cell lymphoma, germinal center B-cell type, c-MYC and BCL-2 double expression. Ki-67 proliferation index is up to 90%; FISH & NGS pending. · Staging work-up: Currently stage II. · First inpatient chemotherapy 9/22/2023- with R-EPOCH. Plan:  · Inpatient hematology oncology following. Recommendation appreciated. · Outpatient follow-up with hematology oncology. · Close TLS workup  · PEG tube per ENT    Oropharyngeal mass  Assessment & Plan  · 9/14 Neck/ soft tissue CT: Large enhancing soft tissue mass identified within the left neck involving multiple spaces. This appears to be centered within the left oropharynx with extensions as described above into multiple spaces. This results in significant airway compromise. 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 Small level 3 and level 4 nodes are seen within the neck. · Tracheostomy by ENT, bx & addition testing performed  · Bx: Positive for malignancy, favor poorly differentiated carcinoma. Cannot entirely exclude a high grade large cell lymphoma. Portion of specimen submitted for flow cytometry. · H/o tobacco abuse, daily smoker. · Daughter was updated at bedside. · Preliminary biopsy: Large B-cell lymphoma, germinal center B-cell type, c-MYC and BCL-2 double expression. · First chemotherapy on 9/22. Plan:  · ENT/ Med onc following, appreciated  · Follow final biopsy report  · As needed vent per Trach. Weaning off  · IV lasix for pulm congestion/ edema. · Goal off vent x 24 hrs for trach change    * Acute respiratory failure with hypoxia secondary to oropharyngeal mass  Assessment & Plan  • POA with O2 saturate around 80% on room air.   Needed high flow 60 L to bring up her O2 saturation to 95%. • Recent hospitalization for respiratory failure second to pneumonia. • CTA PE study negative for PE. New onset RML PNA. • Persistent partial atelectasis of the lower lobes noted. • Current daily smoker.       Plan:  • Tracheostomy 9/17. • Completed Decadron. • Atrovent/Xopenex  • Airway clearance protocol. IS.    (HFpEF) heart failure with preserved ejection fraction [facility])  Assessment & Plan  Wt Readings from Last 3 Encounters:   09/24/23 79.9 kg (176 lb 2.4 oz)   09/07/23 74.6 kg (164 lb 6.4 oz)   08/30/23 73.3 kg (161 lb 9.6 oz)     Lab Results   Component Value Date    LVEF 60 08/28/2023     (H) 09/13/2023     (H) 08/25/2023     • Volume status: Euvolemic. • POA physical (limited): JVD-, HJR-, B/L LE trace to 1+ edema. • Echo 8/28/23: LVEF 60%. D1DD. • CXR 9/22: Persistent pulmonary venous congestion with small effusions and bibasilar atelectasis. IV lasix given with even I/O.       Plan:  • CMP, magnesium; Goal Mg > 2 and K > 4; Replete prn  • HOB > 30°, Daily standing weights, Measure I/O  • IV lasix. Goal -1L. Angioedema  Assessment & Plan  • POA with acute respiratory failure, SOB. • On physical in [facility] ED: Swollen tongue, swelling soft and hard palate and almost the head of all phalanx is completely closed. Severe angioedema. • Decadron 10 mg, Benadryl 25 mg given. • Initially refused but eventually agreed with intubation. • Prior episode of angioedema in 2020 noted. Suspected coadministration of increase the dose of tramadol and lisinopril. • Per daughter, there is no recent change in medications except Trelegy inhaler, cefdinir. 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 Plan:  • Trach placed 9/17    Ambulatory dysfunction  Assessment & Plan  PT/ OT eval before discharge. Pain syndrome, chronic  Assessment & Plan  · Home regimen: Oxycodone 5 mg twice daily as needed. Tylenol 650 mg every 8 hours as needed. Gabapentin 600 mg 3 times daily. Medical marijuana. · Per daughter, patient was overusing Tylenol over-the-counter. · Pain mostly from lumbar radiculopathy. · Impaired ambulatory dysfunction second to pain. Plan:  · Restarted gabapentin 300 3 times daily, scheduled oxycodone 5 mg 3 times daily, as needed Tylenol 650 mg every 6 hours. As needed fentanyl 50 mcg every 2 hours for severe pain. Hyperlipidemia  Assessment & Plan  Lab Results   Component Value Date    CHOLESTEROL 203 (H) 01/24/2023    CHOLESTEROL 177 08/11/2022    CHOLESTEROL 184 07/08/2020     Lab Results   Component Value Date    HDL 45 (L) 01/24/2023    HDL 37 (L) 08/11/2022    HDL 44 (L) 07/08/2020     Lab Results   Component Value Date    TRIG 166 (H) 09/16/2023    TRIG 160 (H) 01/24/2023    TRIG 108 08/11/2022     Lab Results   Component Value Date    NONHDLC 146 07/12/2018    [address] (H) 04/29/2013     Continue outpatient diet/ lifestyle modification. Benign essential hypertension  Assessment & Plan  · Well controlled at home. · Home meds: Amlodipine 10 mg daily, Coreg 25 mg twice daily, lisinopril 10 mg daily. · Elevated BP may second to anxiety from chemotherapy/new diagnosis lymphoma, chronic pain. Plan:  · Discontinue Coreg 2/2 interactions with chemo regimen. · Amlodipine 10 mg daily. Lisinopril 20 mg daily. · PRN hydralazine if BP >160. Chronic Superficial thrombophlebitis  Assessment & Plan  9/23: Patient C/o right forearm soreness. Bilateral upper extremity ultrasound was performed in setting of high risk of DVT. RIGHT UPPER LIMB U/S: No evidence of acute or chronic deep vein thrombosis. 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 Doppler evaluation shows a normal response to augmentation maneuvers. 9/24: Reported improving. Will monitor. COPD without exacerbation ([address])  Assessment & Plan  Continue vent with nebs. Wean off vent.     Blister (nonthermal) of left forearm, sequela  Assessment & Plan  · 9/17/23: Blisters of LUE noted, no signs of infection  · Daily wound care, monitor signs of infection     CKD (chronic kidney disease) stage 3, GFR 30-59 ml/min [facility])  Assessment & Plan  Lab Results   Component Value Date    EGFR 80 09/24/2023    EGFR 87 09/23/2023    EGFR 82 09/22/2023    CREATININE 0.76 09/24/2023    CREATININE 0.71 09/23/2023    CREATININE 0.74 09/22/2023     Stable. ICU Core Measures     Vented Patient  VAP Bundle  VAP bundle ordered     A: Assess, Prevent, and Manage Pain · Has pain been assessed? Yes  · Need for changes to pain regimen? No   B: Both Spontaneous Awakening Trials (SATs) and Spontaneous Breathing Trials (SBTs) · Plan to perform spontaneous awakening trial today? N/A   · Plan to perform spontaneous breathing trial today? Yes   · Obvious barriers to extubation? NA   C: Choice of Sedation · RASS Goal: 0 Alert and Calm  · Need for changes to sedation or analgesia regimen? NA   D: Delirium · CAM-ICU: Negative   E: Early Mobility  · Plan for early mobility? Yes   F: Family Engagement · Plan for family engagement today? Yes       Antibiotic Review: Pre-chemo meds    Review of Invasive Devices:      Central access plan: Medications requiring central line      Prophylaxis:  VTE VTE covered by:  enoxaparin, Subcutaneous, 40 mg at 09/23/23 0806       Stress Ulcer  covered byfamotidine (PEPCID) tablet 20 mg [[id number]]       Subjective   HPI/24hr events: Upper extremity ultrasound revealed chronic superficial thrombophlebitis. Patient was seen and examined at bedside. She was OOA x3 and not in acute distress. Still complained about tracheal site pain and the left-sided headache. Still on vent. Received 80 mg of Lasix yesterday with goal of volume -1 L but still even I/O. Also complained of right sided abdominal pain more consistent with gas pain. Last bowel movement 2 days ago. Did pass flatus.   Otherwise denied dizziness, chest pain, nausea vomiting. Objective                            Vitals I/O      Most Recent Min/Max in 24hrs   Temp 99 °F (37.2 °C) Temp  Min: 98.7 °F (37.1 °C)  Max: 99.1 °F (37.3 °C)   Pulse 89 Pulse  Min: 82  Max: 97   Resp (!) 23 Resp  Min: 10  Max: 27   BP (!) 186/96 BP  Min: 146/78  Max: 212/120   O2 Sat 92 % SpO2  Min: 90 %  Max: 97 %      Intake/Output Summary (Last 24 hours) at 9/24/2023 0710  Last data filed at 9/24/2023 0600  Gross per 24 hour   Intake 2112 ml   Output 2725 ml   Net -613 ml         Diet Enteral/Parenteral; Tube Feeding No Oral Diet; Vital AF 1.2; Continuous; 40; 100; Water; Every 6 hours     Invasive Monitoring Physical exam    Physical Exam  Eyes:      General: Vision grossly intact. No foreign body in eye        Right eye: No discharge. Left eye: No discharge. Extraocular Movements: Extraocular movements intact. Conjunctiva/sclera: Conjunctivae normal.      Comments: Persistent R pupil 4mm > L pupil 3mm   Skin:     General: Skin is warm. Capillary Refill: Capillary refill takes less than 2 seconds. Coloration: Skin is not pale. HENT:      Head: Normocephalic and atraumatic. Right Ear: Hearing normal.      Left Ear: Hearing normal.      Nose: Nasogastric tube present. No rhinorrhea or epistaxis. Mouth/Throat:      Mouth: Mucous membranes are moist. No injury. Neck:      Vascular: No JVD. Trachea: Tracheostomy present. Midline tenderness Cardiovascular:      Rate and Rhythm: Normal rate and regular rhythm. Pulses: Normal pulses. Heart sounds: Normal heart sounds. Musculoskeletal:         General: No swelling or tenderness. Normal range of motion. Right lower leg: No edema. Left lower leg: No edema. Abdominal:      General: There is no distension. Palpations: Abdomen is soft. Tenderness: There is abdominal tenderness (right sided). There is no guarding.    Constitutional:       General: She is not in acute distress. Appearance: She is ill-appearing. Interventions: Cervical collar in place. Pulmonary:      Effort: No accessory muscle usage, respiratory distress or accessory muscle usage. Breath sounds: No wheezing, rhonchi or rales. Chest:      Chest wall: No tenderness. Psychiatric:         Mood and Affect:  mood and affect abnormal.  Neurological:      General: No focal deficit present. Mental Status: She is alert and oriented to person, place and time. She is CAM ICU negative. Cranial Nerves: No facial asymmetry. Motor: Strength full and intact in all extremities. Diagnostic Studies      EKG: No new EKG. Imaging:   VAS upper limb venous duplex scan, unilateral/limited   Final Result by José Luis Sepulveda MD (09/23 1841)      XR chest portable ICU   Final Result by Prince Chayito MD (09/22 1007)      Persistent pulmonary venous congestion with small effusions and bibasilar atelectasis. Workstation performed: [id number]         US right upper quadrant   Final Result by Linette Costa MD (09/22 0630)      No cholelithiasis. Gallbladder sludge is present with wall thickness upper limits of normal. Negative sonographic Cancino sign. Findings may be sequela of chronic gallbladder dysfunction. Consider follow-up with a HIDA scan if it would alter patient    management. Workstation performed: [id number]         CT abdomen pelvis w contrast   Final Result by Maria Isabel Lopez MD (09/21 4789)      1. No abdominal lymphadenopathy. 2.  Question gallbladder wall thickening versus motion artifact. If there is clinical concern for gallbladder pathology, ultrasound is recommended. 3.  Chronic T12 lytic lesion with pathologic fracture. Workstation performed: [id number]         MRI brain w wo contrast   Final Result by Sultana Borrero MD (09/21 1055)      No evidence of brain metastases.       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 Asymmetric patchy enhancement and restricted diffusion in the left mastoid. The differential includes neoplastic involvement and infection. Workstation performed: [id number]         MRI soft tissue neck wo and w contrast   Final Result by Anshul Zarate MD (09/21 1005)      Known, large left neck mass described in detail above. Workstation performed: [id number]         XR chest portable   Final Result by Cisco Alanis DO (09/20 1123)      1. Nasogastric tube is seen with its distal portions within the stomach. The tip of the tube courses beyond the inferior margin of the study. 2. Pulmonary vascular congestion with obscuration of the left hemidiaphragm. This is stable from prior radiograph and may represent small left effusion versus consolidation. 3. Right basilar atelectasis versus trace right effusion. Workstation performed: [id number]         XR chest portable   Final Result by Christina Franklin MD (09/18 1255)   Increased lung markings seen suggest congestion. The left base is obscured   No worsening            Workstation performed: [id number]         CT soft tissue neck w contrast   Final Result by Woo Bojorquez DO (09/14 1644)      Large enhancing soft tissue mass identified within the left neck involving multiple spaces. This appears to be centered within the left oropharynx with extensions as described above into multiple spaces. This results in significant airway compromise. 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 Small level 3 and level 4 nodes are seen within the neck. I personally discussed this study with ICU resident on 9/14/2023 4:44 PM.            Workstation performed: [id number]              I have personally reviewed pertinent reports.    and I have personally reviewed pertinent films in PACS Medications:  Scheduled PRN   acyclovir, 400 mg, BID  allopurinol, 300 mg, Daily  amLODIPine, 10 mg, Daily  busPIRone, 30 mg, TID  chlorhexidine, 15 mL, Q12H [address]  [START ON 9/26/2023] cyclophosphamide (CYTOXAN) 1,350 mg in sodium chloride 0.9 % 250 mL IVPB, 750 mg/m2 (Treatment Plan Recorded), Once  DOXOrubicin (ADRIAMYCIN) 18 mg, etoposide (TOPOSAR) 90 mg, vinCRIStine (ONCOVIN) 0.7 mg in sodium chloride 0.9 % 500 mL infusion, , Q24H  enoxaparin, 40 mg, Q24H ASHLEY  famotidine, 20 mg, BID  fluconazole, 400 mg, Daily  gabapentin, 300 mg, TID  guaiFENesin, 200 mg, Q6H  levalbuterol, 1.25 mg, Q6H   And  ipratropium, 0.5 mg, Q6H  levofloxacin, 500 mg, Q24H ASHELY  lisinopril, 20 mg, Daily  nicotine, 21 mg, Daily  nystatin, 500,000 Units, 4x Daily  ondansetron (ZOFRAN) 16 mg, dexamethasone (DECADRON) 10 mg in sodium chloride 0.9 % 50 mL IVPB, , Q24H  oxyCODONE, 5 mg, Q8H [address]  potassium-sodium phosphateS, 1 tablet, 4x Daily (with meals and at bedtime)  predniSONE, 60 mg/m2 (Treatment Plan Recorded), BID With Meals  senna, 8.8 mg, HS  sertraline, 50 mg, Daily  [START ON 9/26/2023] sodium chloride, 2,000 mL, Once  sulfamethoxazole-trimethoprim, 1 tablet, Once per day on Mon Wed Fri      acetaminophen, 650 mg, Q6H PRN  alteplase, 2 mg, Q1MIN PRN  fentanyl citrate (PF), 50 mcg, Q2H PRN  hydrALAZINE, 10 mg, Q6H PRN  levalbuterol, 1.25 mg, Q8H PRN  ondansetron, 4 mg, Q6H PRN  ondansetron, 4 mg, Q8H PRN  oxyCODONE, 2.5 mg, Q4H PRN   Or  oxyCODONE, 5 mg, Q4H PRN  polyethylene glycol, 17 g, Daily PRN  sodium chloride, 20 mL/hr, Daily PRN       Continuous          Labs:    CBC    Recent Labs     09/23/23  0529 09/24/23  0500   WBC 9.72 13.95*   HGB 10.6* 11.0*   HCT 32.8* 34.2*    221     BMP    Recent Labs     09/23/23  0529 09/24/23  0500   SODIUM 138 140   K 3.4* 3.6    104   CO2 28 29   AGAP 6 7   BUN 24 28*   CREATININE 0.71 0.76   CALCIUM 8.5 8.8       Coags    No recent results     Additional Electrolytes  Recent Labs     09/23/23  0529 09/24/23  0500   MG 2.1 2.0   PHOS 2.5 2.1*   CAIONIZED  --  1.19          Blood Gas    No recent results  No recent results LFTs  Recent Labs     09/23/23  0529 09/24/23  0500   ALT 15 17   AST 14 15   ALKPHOS 61 58   ALB 2.6* 2.7*   TBILI 0.32 0.30       Infectious  No recent results  Glucose  Recent Labs     09/22/23  2248 09/23/23  0529 09/24/23  0500   GLUC 129 161* 163*               Vipul Luque MD

## 2023-09-24 NOTE — ASSESSMENT & PLAN NOTE
9/23: Patient C/o right forearm soreness. Bilateral upper extremity ultrasound was performed in setting of high risk of DVT. RIGHT UPPER LIMB U/S: No evidence of acute or chronic deep vein thrombosis. 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 Doppler evaluation shows a normal response to augmentation maneuvers. 9/24: Reported improving. Will monitor.

## 2023-09-24 NOTE — QUICK NOTE
Pt seen and evaluated by speech again today and again failed swallow evaluation. Pt had noel aspiration of puree foods and nectar thick liquids. Speech recommends PEG placement for nutrition. D/w oncology, okay for PEG placement as long as counts remain stable. GI consulted for PEG placement after discussion it was decided with her oropharyngeal mass, PEG would be difficult to place endoscopically and recommends IR consult for percutaneous PEG placement. Plan to consult IR Monday 9/25 for further evaluation of PEG placement.

## 2023-09-24 NOTE — QUICK NOTE
Patient's SBP at home averagely around 180s while on her home regimen. 9/24 2:30PM  Patient's SBP persistently elevated above 200s s/p hydralazine 10mg. Denied HA, dizziness, vision change. Confirmed with Pharmacist that Labetalol does NOT have interaction with patient's current R-EPOCH chemo regimen. Trail of 10 mg Labetalol will be given. Will re-eval.    9/24 2:45 - 3:20PM  SBP lowered to 170s. Denied HA, dizziness, vision change. 9/24 3:30PM  SBP elevated again. Asymptomatic.   - Will give one dose of Lasix 40 mg. Monitor UOP. Goal negative I/O.   - Change Hydralazine 10 mg Q6h to Q4h. Lower dose to wean off if able. - May consider to add HCTZ/ BB if still poor controlled BP. Patient's elevated BP may 2/2 her anxiety from chemo or chemo S/E. CXR showed lung vascular congestion. Echo G1DD noted. Prompt diuresis should be considered. However, patient also has bulky mucus production, h/o mucus plug in airway before. Aggressive diuresis may worsen potential mucus plug. May start 3% saline neb down the road. Need to monitor closely on I/O. Currently reported improvement on breath.

## 2023-09-24 NOTE — SPEECH THERAPY NOTE
Speech Language/Pathology    Speech/Language Pathology Cancel Note    Patient Name: Pricila Baig  [id number] Date: 9/24/2023     Problem List  Principal Problem:    Acute respiratory failure with hypoxia secondary to oropharyngeal mass  Active Problems:    Benign essential hypertension    Hyperlipidemia    Pain syndrome, chronic    CKD (chronic kidney disease) stage 3, GFR 30-59 ml/min (HCC)    (HFpEF) heart failure with preserved ejection fraction (HCC)    Ambulatory dysfunction    Angioedema    Oropharyngeal mass    Blister (nonthermal) of left forearm, sequela    COPD without exacerbation (HCC)    Large cell lymphoma (HCC)    Chemotherapy induced neutropenia (HCC)    Chronic Superficial thrombophlebitis    Chart reviewed, case discussed w/ Dr. Kaur Chun from ENT on 9/21, pt w/ noel aspiration of puree and NTL. rec PEG placement. Pt off and on vent;  not appropriate for PMV yet. Will sign off, reconsult when trach changed for PMV and/or after lymphoma treatment, decrease in tumor size for swallow re-assessment.        Gretchen Boo CCC-SLP  Speech Pathologist  Available via  tiger text

## 2023-09-25 ENCOUNTER — APPOINTMENT (INPATIENT)
Dept: RADIOLOGY | Facility: HOSPITAL | Age: 68
DRG: 004 | End: 2023-09-25
Payer: COMMERCIAL

## 2023-09-25 LAB
ALBUMIN SERPL BCP-MCNC: 2.8 G/DL (ref 3.5–5)
ALP SERPL-CCNC: 51 U/L (ref 34–104)
ALT SERPL W P-5'-P-CCNC: 17 U/L (ref 7–52)
ANION GAP SERPL CALCULATED.3IONS-SCNC: 3 MMOL/L
AST SERPL W P-5'-P-CCNC: 13 U/L (ref 13–39)
BILIRUB SERPL-MCNC: 0.3 MG/DL (ref 0.2–1)
BUN SERPL-MCNC: 28 MG/DL (ref 5–25)
CALCIUM ALBUM COR SERPL-MCNC: 9.8 MG/DL (ref 8.3–10.1)
CALCIUM SERPL-MCNC: 8.8 MG/DL (ref 8.4–10.2)
CHLORIDE SERPL-SCNC: 106 MMOL/L (ref 96–108)
CO2 SERPL-SCNC: 30 MMOL/L (ref 21–32)
CREAT SERPL-MCNC: 0.72 MG/DL (ref 0.6–1.3)
ERYTHROCYTE [DISTWIDTH] IN BLOOD BY AUTOMATED COUNT: 15.3 % (ref 11.6–15.1)
GFR SERPL CREATININE-BSD FRML MDRD: 85 ML/MIN/1.73SQ M
GLUCOSE SERPL-MCNC: 150 MG/DL (ref 65–140)
HCT VFR BLD AUTO: 34.7 % (ref 34.8–46.1)
HGB BLD-MCNC: 11.2 G/DL (ref 11.5–15.4)
LDH SERPL-CCNC: 240 U/L (ref 140–271)
MAGNESIUM SERPL-MCNC: 1.9 MG/DL (ref 1.9–2.7)
MCH RBC QN AUTO: 31.4 PG (ref 26.8–34.3)
MCHC RBC AUTO-ENTMCNC: 32.3 G/DL (ref 31.4–37.4)
MCV RBC AUTO: 97 FL (ref 82–98)
PHOSPHATE SERPL-MCNC: 2.2 MG/DL (ref 2.3–4.1)
PLATELET # BLD AUTO: 204 THOUSANDS/UL (ref 149–390)
PMV BLD AUTO: 9.6 FL (ref 8.9–12.7)
POTASSIUM SERPL-SCNC: 3.4 MMOL/L (ref 3.5–5.3)
PROT SERPL-MCNC: 5.9 G/DL (ref 6.4–8.4)
RBC # BLD AUTO: 3.57 MILLION/UL (ref 3.81–5.12)
SODIUM SERPL-SCNC: 139 MMOL/L (ref 135–147)
URATE SERPL-MCNC: 2.4 MG/DL (ref 2–7.5)
WBC # BLD AUTO: 11.23 THOUSAND/UL (ref 4.31–10.16)

## 2023-09-25 PROCEDURE — 84100 ASSAY OF PHOSPHORUS: CPT | Performed by: STUDENT IN AN ORGANIZED HEALTH CARE EDUCATION/TRAINING PROGRAM

## 2023-09-25 PROCEDURE — 84550 ASSAY OF BLOOD/URIC ACID: CPT | Performed by: STUDENT IN AN ORGANIZED HEALTH CARE EDUCATION/TRAINING PROGRAM

## 2023-09-25 PROCEDURE — 94760 N-INVAS EAR/PLS OXIMETRY 1: CPT

## 2023-09-25 PROCEDURE — 99233 SBSQ HOSP IP/OBS HIGH 50: CPT

## 2023-09-25 PROCEDURE — 80053 COMPREHEN METABOLIC PANEL: CPT | Performed by: STUDENT IN AN ORGANIZED HEALTH CARE EDUCATION/TRAINING PROGRAM

## 2023-09-25 PROCEDURE — 94640 AIRWAY INHALATION TREATMENT: CPT

## 2023-09-25 PROCEDURE — 83615 LACTATE (LD) (LDH) ENZYME: CPT | Performed by: STUDENT IN AN ORGANIZED HEALTH CARE EDUCATION/TRAINING PROGRAM

## 2023-09-25 PROCEDURE — NC001 PR NO CHARGE: Performed by: INTERNAL MEDICINE

## 2023-09-25 PROCEDURE — 94668 MNPJ CHEST WALL SBSQ: CPT

## 2023-09-25 PROCEDURE — 99291 CRITICAL CARE FIRST HOUR: CPT | Performed by: STUDENT IN AN ORGANIZED HEALTH CARE EDUCATION/TRAINING PROGRAM

## 2023-09-25 PROCEDURE — 85027 COMPLETE CBC AUTOMATED: CPT | Performed by: STUDENT IN AN ORGANIZED HEALTH CARE EDUCATION/TRAINING PROGRAM

## 2023-09-25 PROCEDURE — 71045 X-RAY EXAM CHEST 1 VIEW: CPT

## 2023-09-25 PROCEDURE — 99232 SBSQ HOSP IP/OBS MODERATE 35: CPT | Performed by: INTERNAL MEDICINE

## 2023-09-25 PROCEDURE — 94669 MECHANICAL CHEST WALL OSCILL: CPT

## 2023-09-25 PROCEDURE — 83735 ASSAY OF MAGNESIUM: CPT

## 2023-09-25 PROCEDURE — NC001 PR NO CHARGE: Performed by: STUDENT IN AN ORGANIZED HEALTH CARE EDUCATION/TRAINING PROGRAM

## 2023-09-25 RX ORDER — SODIUM CHLORIDE FOR INHALATION 3 %
4 VIAL, NEBULIZER (ML) INHALATION
Status: DISCONTINUED | OUTPATIENT
Start: 2023-09-25 | End: 2023-10-11

## 2023-09-25 RX ORDER — POTASSIUM CHLORIDE 14.9 MG/ML
20 INJECTION INTRAVENOUS ONCE
Status: COMPLETED | OUTPATIENT
Start: 2023-09-25 | End: 2023-09-25

## 2023-09-25 RX ORDER — HYDRALAZINE HYDROCHLORIDE 20 MG/ML
10 INJECTION INTRAMUSCULAR; INTRAVENOUS ONCE
Status: COMPLETED | OUTPATIENT
Start: 2023-09-25 | End: 2023-09-25

## 2023-09-25 RX ORDER — LABETALOL HYDROCHLORIDE 5 MG/ML
10 INJECTION, SOLUTION INTRAVENOUS ONCE
Status: DISCONTINUED | OUTPATIENT
Start: 2023-09-25 | End: 2023-09-25

## 2023-09-25 RX ORDER — LABETALOL HYDROCHLORIDE 5 MG/ML
10 INJECTION, SOLUTION INTRAVENOUS ONCE
Status: COMPLETED | OUTPATIENT
Start: 2023-09-25 | End: 2023-09-25

## 2023-09-25 RX ORDER — HYDRALAZINE HYDROCHLORIDE 20 MG/ML
20 INJECTION INTRAMUSCULAR; INTRAVENOUS ONCE
Status: DISCONTINUED | OUTPATIENT
Start: 2023-09-25 | End: 2023-09-25

## 2023-09-25 RX ADMIN — SODIUM CHLORIDE 20 ML/HR: 0.9 INJECTION, SOLUTION INTRAVENOUS at 09:41

## 2023-09-25 RX ADMIN — SODIUM CHLORIDE SOLN NEBU 3% 4 ML: 3 NEBU SOLN at 19:34

## 2023-09-25 RX ADMIN — METOPROLOL TARTRATE 25 MG: 25 TABLET, FILM COATED ORAL at 12:41

## 2023-09-25 RX ADMIN — ONDANSETRON: 2 INJECTION INTRAMUSCULAR; INTRAVENOUS at 09:41

## 2023-09-25 RX ADMIN — FAMOTIDINE 20 MG: 20 TABLET, FILM COATED ORAL at 08:15

## 2023-09-25 RX ADMIN — SULFAMETHOXAZOLE AND TRIMETHOPRIM 1 TABLET: 800; 160 TABLET ORAL at 08:35

## 2023-09-25 RX ADMIN — LEVOFLOXACIN 500 MG: 250 TABLET, FILM COATED ORAL at 08:35

## 2023-09-25 RX ADMIN — IPRATROPIUM BROMIDE 0.5 MG: 0.5 SOLUTION RESPIRATORY (INHALATION) at 13:49

## 2023-09-25 RX ADMIN — FLUCONAZOLE 400 MG: 100 TABLET ORAL at 08:35

## 2023-09-25 RX ADMIN — IPRATROPIUM BROMIDE 0.5 MG: 0.5 SOLUTION RESPIRATORY (INHALATION) at 19:34

## 2023-09-25 RX ADMIN — ENOXAPARIN SODIUM 40 MG: 40 INJECTION SUBCUTANEOUS at 08:15

## 2023-09-25 RX ADMIN — HYDRALAZINE HYDROCHLORIDE 10 MG: 20 INJECTION, SOLUTION INTRAMUSCULAR; INTRAVENOUS at 19:23

## 2023-09-25 RX ADMIN — NYSTATIN 500000 UNITS: 100000 SUSPENSION ORAL at 21:37

## 2023-09-25 RX ADMIN — SODIUM CHLORIDE SOLN NEBU 3% 4 ML: 3 NEBU SOLN at 07:33

## 2023-09-25 RX ADMIN — OXYCODONE HYDROCHLORIDE 5 MG: 5 SOLUTION ORAL at 05:31

## 2023-09-25 RX ADMIN — METOPROLOL TARTRATE 25 MG: 25 TABLET, FILM COATED ORAL at 21:37

## 2023-09-25 RX ADMIN — GABAPENTIN 300 MG: 300 CAPSULE ORAL at 16:02

## 2023-09-25 RX ADMIN — HYDRALAZINE HYDROCHLORIDE 10 MG: 20 INJECTION, SOLUTION INTRAMUSCULAR; INTRAVENOUS at 02:49

## 2023-09-25 RX ADMIN — HYDRALAZINE HYDROCHLORIDE 10 MG: 20 INJECTION, SOLUTION INTRAMUSCULAR; INTRAVENOUS at 07:59

## 2023-09-25 RX ADMIN — NYSTATIN 500000 UNITS: 100000 SUSPENSION ORAL at 12:41

## 2023-09-25 RX ADMIN — PREDNISONE 100 MG: 50 TABLET ORAL at 16:02

## 2023-09-25 RX ADMIN — ACYCLOVIR 400 MG: 200 CAPSULE ORAL at 08:36

## 2023-09-25 RX ADMIN — HYDRALAZINE HYDROCHLORIDE 10 MG: 20 INJECTION, SOLUTION INTRAMUSCULAR; INTRAVENOUS at 03:14

## 2023-09-25 RX ADMIN — LEVALBUTEROL HYDROCHLORIDE 1.25 MG: 1.25 SOLUTION RESPIRATORY (INHALATION) at 00:09

## 2023-09-25 RX ADMIN — NICOTINE 21 MG: 21 PATCH, EXTENDED RELEASE TRANSDERMAL at 08:16

## 2023-09-25 RX ADMIN — NYSTATIN 500000 UNITS: 100000 SUSPENSION ORAL at 17:13

## 2023-09-25 RX ADMIN — BUSPIRONE HYDROCHLORIDE 30 MG: 10 TABLET ORAL at 16:02

## 2023-09-25 RX ADMIN — FAMOTIDINE 20 MG: 20 TABLET, FILM COATED ORAL at 17:13

## 2023-09-25 RX ADMIN — AMLODIPINE BESYLATE 10 MG: 5 TABLET ORAL at 08:15

## 2023-09-25 RX ADMIN — GUAIFENESIN 200 MG: 200 SOLUTION ORAL at 14:01

## 2023-09-25 RX ADMIN — PREDNISONE 100 MG: 50 TABLET ORAL at 08:36

## 2023-09-25 RX ADMIN — BUSPIRONE HYDROCHLORIDE 30 MG: 10 TABLET ORAL at 21:37

## 2023-09-25 RX ADMIN — ETOPOSIDE: 20 INJECTION, SOLUTION INTRAVENOUS at 10:18

## 2023-09-25 RX ADMIN — OXYCODONE HYDROCHLORIDE 5 MG: 5 SOLUTION ORAL at 21:37

## 2023-09-25 RX ADMIN — SERTRALINE HYDROCHLORIDE 50 MG: 50 TABLET ORAL at 08:14

## 2023-09-25 RX ADMIN — LISINOPRIL 20 MG: 20 TABLET ORAL at 08:15

## 2023-09-25 RX ADMIN — GUAIFENESIN 200 MG: 200 SOLUTION ORAL at 08:14

## 2023-09-25 RX ADMIN — CHLORHEXIDINE GLUCONATE 15 ML: 1.2 SOLUTION ORAL at 08:14

## 2023-09-25 RX ADMIN — OXYCODONE HYDROCHLORIDE 2.5 MG: 5 SOLUTION ORAL at 08:23

## 2023-09-25 RX ADMIN — GUAIFENESIN 200 MG: 200 SOLUTION ORAL at 02:49

## 2023-09-25 RX ADMIN — GABAPENTIN 300 MG: 300 CAPSULE ORAL at 21:37

## 2023-09-25 RX ADMIN — SODIUM CHLORIDE SOLN NEBU 3% 4 ML: 3 NEBU SOLN at 13:49

## 2023-09-25 RX ADMIN — GUAIFENESIN 200 MG: 200 SOLUTION ORAL at 21:36

## 2023-09-25 RX ADMIN — LEVALBUTEROL HYDROCHLORIDE 1.25 MG: 1.25 SOLUTION RESPIRATORY (INHALATION) at 07:33

## 2023-09-25 RX ADMIN — Medication 8.8 MG: at 21:37

## 2023-09-25 RX ADMIN — LEVALBUTEROL HYDROCHLORIDE 1.25 MG: 1.25 SOLUTION RESPIRATORY (INHALATION) at 13:49

## 2023-09-25 RX ADMIN — HYDRALAZINE HYDROCHLORIDE 10 MG: 20 INJECTION, SOLUTION INTRAMUSCULAR; INTRAVENOUS at 17:13

## 2023-09-25 RX ADMIN — POTASSIUM CHLORIDE 20 MEQ: 14.9 INJECTION, SOLUTION INTRAVENOUS at 07:53

## 2023-09-25 RX ADMIN — CHLORHEXIDINE GLUCONATE 15 ML: 1.2 SOLUTION ORAL at 21:37

## 2023-09-25 RX ADMIN — IPRATROPIUM BROMIDE 0.5 MG: 0.5 SOLUTION RESPIRATORY (INHALATION) at 00:09

## 2023-09-25 RX ADMIN — LABETALOL HYDROCHLORIDE 10 MG: 5 INJECTION, SOLUTION INTRAVENOUS at 04:08

## 2023-09-25 RX ADMIN — LEVALBUTEROL HYDROCHLORIDE 1.25 MG: 1.25 SOLUTION RESPIRATORY (INHALATION) at 19:34

## 2023-09-25 RX ADMIN — ALLOPURINOL 300 MG: 100 TABLET ORAL at 08:18

## 2023-09-25 RX ADMIN — IPRATROPIUM BROMIDE 0.5 MG: 0.5 SOLUTION RESPIRATORY (INHALATION) at 07:33

## 2023-09-25 RX ADMIN — NYSTATIN 500000 UNITS: 100000 SUSPENSION ORAL at 08:14

## 2023-09-25 RX ADMIN — BUSPIRONE HYDROCHLORIDE 30 MG: 10 TABLET ORAL at 08:25

## 2023-09-25 RX ADMIN — OXYCODONE HYDROCHLORIDE 5 MG: 5 SOLUTION ORAL at 14:01

## 2023-09-25 RX ADMIN — GABAPENTIN 300 MG: 300 CAPSULE ORAL at 08:14

## 2023-09-25 RX ADMIN — ACYCLOVIR 400 MG: 200 CAPSULE ORAL at 17:13

## 2023-09-25 NOTE — ASSESSMENT & PLAN NOTE
Lab Results   Component Value Date    CHOLESTEROL 203 (H) 01/24/2023    CHOLESTEROL 177 08/11/2022    CHOLESTEROL 184 07/08/2020     Lab Results   Component Value Date    HDL 45 (L) 01/24/2023    HDL 37 (L) 08/11/2022    HDL 44 (L) 07/08/2020     Lab Results   Component Value Date    TRIG 166 (H) 09/16/2023    TRIG 160 (H) 01/24/2023    TRIG 108 08/11/2022     Lab Results   Component Value Date    NONHDLC 146 07/12/2018    [address] [facility] 207 (H) 04/29/2013     Continue outpatient diet/ lifestyle modification.

## 2023-09-25 NOTE — PROGRESS NOTES
Critical Care Attending Note; Judith Reed   Note Date: 23  Note Time: 11:48 AM    /Patient: Keven Dwyer  Age, : 69 y.o., 1955 MRN: [id number] Code Status: Level 1 - Full Code Patient Location: ICU 03/ICU [address] [place] [address] LOS:12 days  ]   Patient seen and examined, medical record reviewed, discussed with house staff and nursing staff. HPI   CC: BCell Lymphoma  67F with a PMH of HFpEF, CKD stage III, HTN, HLD admitted for pharengeal edema requiring intubation found to have diagnosis of B cell lymphoma     Key Recent Events   : Admitted, Intubated  : Alicia Alejo  : R-EPOCH cycle 1 day 1    Main ICU Plans:       #Neuro  Chronic Pain  - Continue Home regimen     #Card  HTN  - Increase Lisinopril, start Metoprolol   - Continue Norvasc    HFpEF - POCUS - Euvolemic  - Hold Lasix     #Pulm  Tracheostomy   - Tolerating TCT - HFNC   - Wean FiO2 - Maintain O2 Sat >90%  - Pulmonary toilet regimen - Hypertonic Saline, Acapella, Chest PT   - Vap/Trach care  - ENT consulted appreciate recs  - PT/OT    #GI  Dysphagia - Aspirating  - Plan PEG with IR    #Heme  B - Cell Lymphoma  - Oncology Consulted - R- EPOCH  - TLS Monitoring daily  - Allopurinol   - Continue Prophylaxis - Acyclovir, Fluc, Levofloxacin, Bactrim    #DVT/GI ppx  Lovenox    #Lines/Tubes/Drains:   Peripheral IV 23 Right;Ventral (anterior) Forearm (Active)   Number of days: 4       PICC Line  Right Basilic (Active)   Number of days: 4       NG/OG/Enteral Tube Nasogastric 16 Fr Right nare (Active)   Number of days: 3       #Nutrition:   Diet Enteral/Parenteral; Tube Feeding No Oral Diet; Vital AF 1.2; Continuous; 40; 100; Water; Every 6 hours        #Code Status:   Level 1 - Full Code    #Dispo:   Likely floors        Judith Reed MD  Pulmonary, Critical Care    Critical care time, excluding procedures, teaching, family meetings, and excludes any prior time recorded by the AP/resident, 35 minutes.  Upon my evaluation, this patient has a high probability of imminent or life-threatening deterioration due to above problems which required my direct attention, intervention, and personal management. Impression/Active Problems:    B Cell lymphoma   Tracheostomy   Respiratory Failure   Secretions   HTN    TLS    Dysphagia   Chronic pain       Physical Exam:     Vital Signs:   Weight: 78.9 kg (173 lb 15.1 oz)  IBW: Ideal body weight: 47.8 kg (105 lb 6.1 oz)  Adjusted ideal body weight: 60.2 kg (132 lb 12.9 oz)  Temp:  [97.8 °F (36.6 °C)-99.2 °F (37.3 °C)] 97.8 °F (36.6 °C)  HR:  [] 91  Resp:  [11-26] 16  BP: (149-215)/() 177/85  FiO2 (%):  [] 60  General: NAD  Neuro: AxO 3  Heart: RRR  Lungs: Basilar crackles  Abdomen: Soft NT  Extremities: No edema                 Ventilator Settings:    FiO2 (%):  [] 60          Invalid input(s): "PCO2", "O2"  Radiologic Images Reviewed:    CXR 9/22  Concerning for Persistent pulmonary venous congestion with small effusions and bibasilar atelectasis.      Input / Output:       Intake/Output Summary (Last 24 hours) at 9/25/2023 1148  Last data filed at 9/25/2023 0941  Gross per 24 hour   Intake 1913.07 ml   Output 1675 ml   Net 238.07 ml            Infusions:     Scheduled Medications:  Current Facility-Administered Medications   Medication Dose Route Frequency Provider Last Rate   • acetaminophen  650 mg Oral Q6H PRN Sabina Acevedo MD     • acyclovir  400 mg Oral BID Juan Jose Borrego MD     • allopurinol  300 mg Oral Daily Juan Jose Borrego MD     • alteplase  2 mg Intracatheter Q1MIN PRN Juan Jose Borrego MD     • amLODIPine  10 mg Oral Daily Greg Alcaraz DO     • busPIRone  30 mg Oral TID Ciarra Gonzalez MD     • chlorhexidine  15 mL Mouth/Throat Q12H [facility] Ciarra Gonzalez MD     • [START ON 9/26/2023] cyclophosphamide (CYTOXAN) 1,350 mg in sodium chloride 0.9 % 250 mL IVPB  750 mg/m2 (Treatment Plan Recorded) Intravenous Once Juan Jose Borrego MD     • DOXOrubicin (ADRIAMYCIN) 18 mg, etoposide (TOPOSAR) 90 mg, vinCRIStine (ONCOVIN) 0.7 mg in sodium chloride 0.9 % 500 mL infusion   Intravenous Q24H Chicho Rivero MD 23 mL/hr at 09/25/23 1018   • enoxaparin  40 mg Subcutaneous Q24H [facility] Isidoro Macario MD     • famotidine  20 mg Oral BID [facility],      • fentanyl citrate (PF)  50 mcg Intravenous Q2H PRN Isidoro Macario MD     • fluconazole  400 mg Oral Daily Chicho Rivero MD     • gabapentin  300 mg Oral TID Isidoro Macario MD     • guaiFENesin  200 mg Oral Q6H RANDEE Ibarra     • hydrALAZINE  10 mg Intravenous Q4H PRN Diana Downing MD     • levalbuterol  1.25 mg Nebulization Q6H Isidoro Macario MD      And   • ipratropium  0.5 mg Nebulization Q6H Isidoro Macario MD     • levalbuterol  1.25 mg Nebulization Q8H PRN Isidoro Macario MD     • levofloxacin  500 mg Oral Q24H Yves Short MD     • lisinopril  20 mg Oral Daily Milli Cervantes PA-C     • nicotine  21 mg Transdermal Daily Isidoro Macario MD     • nystatin  500,000 Units Swish & Swallow 4x Daily Isidoro Macario MD     • ondansetron (ZOFRAN) 16 mg, dexamethasone (DECADRON) 10 mg in sodium chloride 0.9 % 50 mL IVPB   Intravenous Q24H Chicho Rivero  mL/hr at 09/25/23 0941   • ondansetron  4 mg Intravenous Q8H PRN Chicho Rivero MD     • oxyCODONE  2.5 mg Oral Q4H PRN RANDEE Valdes      Or   • oxyCODONE  5 mg Oral Q4H PRN RANDEE Valdes     • oxyCODONE  5 mg Oral Q8H Diana Downing MD     • polyethylene glycol  17 g Oral Daily PRN Isidoro Macario MD     • predniSONE  60 mg/m2 (Treatment Plan Recorded) Oral BID With Meals Chicho Rivero MD     • senna  8.8 mg Per NG Tube HS Diana Downing MD     • sertraline  50 mg Oral Daily Isidoro Macario MD     • [START ON 9/26/2023] sodium chloride  2,000 mL Intravenous Once Chicho Rivero MD     • sodium chloride  20 mL/hr Intravenous Daily PRN Chicho Rivero MD 20 mL/hr (09/25/23 0941)   • sodium chloride  4 mL Nebulization Q6H RANDEE Castro • sulfamethoxazole-trimethoprim  1 tablet Oral Once per day on Mon Wed Fri Claudia Burks MD         PRN Medications:  •  acetaminophen  •  alteplase  •  fentanyl citrate (PF)  •  hydrALAZINE  •  levalbuterol  •  ondansetron  •  oxyCODONE **OR** oxyCODONE  •  [COMPLETED] polyethylene glycol **FOLLOWED BY** polyethylene glycol  •  sodium chloride    Labs Reviewed:  Results from last 7 days   Lab Units 09/25/23  0539 09/24/23  0500 09/23/23  0529   WBC Thousand/uL 11.23* 13.95* 9.72   HEMOGLOBIN g/dL 11.2* 11.0* 10.6*   HEMATOCRIT % 34.7* 34.2* 32.8*   PLATELETS Thousands/uL 204 221 181      Results from last 7 days   Lab Units 09/25/23  0539 09/24/23  0500 09/23/23  0529 09/20/23  0527 09/19/23  0704   SODIUM mmol/L 139 140 138   < >  --    CO2 mmol/L 30 29 28   < >  --    CO2, I-STAT mmol/L  --   --   --   --  29   BUN mg/dL 28* 28* 24   < >  --    GLUCOSE, ISTAT mg/dl  --   --   --   --  131   CALCIUM mg/dL 8.8 8.8 8.5   < >  --    MAGNESIUM mg/dL 1.9 2.0 2.1   < >  --    PHOSPHORUS mg/dL 2.2* 2.1* 2.5   < >  --     < > = values in this interval not displayed. Invalid input(s): "ASTSGOT", "ALTSGPT"LABRCNTIP@ ,alkphos:3,tbilirubin:3,dbilirubin:3)@      Invalid input(s): "TROPT", "CPK", "PBNP"             I have personally seen and examined the patient on (09/25/23 between 0031-2522). I discussed the patient with the AP/resident including, but not limited to, verifying findings; reviewing labs and x-rays; discussing with consultants; developing the plan of care with the bedside nurse; and discussing treatment plan with patient or surrogate. I have reviewed the note and assessment performed by the AP/resident and agree with the AP/resident’s documented findings and plan of care with the above additions/exceptions. Please see my comments for details and adjustments.

## 2023-09-25 NOTE — ASSESSMENT & PLAN NOTE
Lab Results   Component Value Date    EGFR 85 09/25/2023    EGFR 80 09/24/2023    EGFR 87 09/23/2023    CREATININE 0.72 09/25/2023    CREATININE 0.76 09/24/2023    CREATININE 0.71 09/23/2023     Stable.

## 2023-09-25 NOTE — RESPIRATORY THERAPY NOTE
09/25/23 1422   Respiratory Assessment   Resp Comments switched from HFNC to trach collar X 2 attempts to reach store room for 7XL inner cannulas   Oxygen Therapy/Pulse Ox   O2 Device Trach mask   O2 Therapy Oxygen humidified   FiO2 (%) 50   O2 Flow Rate (L/min) 12 L/min   SpO2 95 %   SpO2 Activity At Rest   $ Pulse Oximetry Spot Check Charge Completed

## 2023-09-25 NOTE — PLAN OF CARE
Problem: MOBILITY - ADULT  Goal: Maintains/Returns to pre admission functional level  Description: INTERVENTIONS:  - Perform BMAT or MOVE assessment daily.   - Set and communicate daily mobility goal to care team and patient/family/caregiver. - Collaborate with rehabilitation services on mobility goals if consulted  - Perform Range of Motion 3 times a day. - Reposition patient every 2 hours. - Dangle patient 1 times a day  - Stand patient 1 times a day  - Ambulate patient 1 times a day  - Out of bed to chair 1 times a day   - Record patient progress and toleration of activity level   Outcome: Progressing     Problem: Prexisting or High Potential for Compromised Skin Integrity  Goal: Skin integrity is maintained or improved  Description: INTERVENTIONS:  - Identify patients at risk for skin breakdown  - Assess and monitor skin integrity  - Assess and monitor nutrition and hydration status  - Monitor labs   - Assess for incontinence   - Turn and reposition patient  - Assist with mobility/ambulation  - Relieve pressure over bony prominences  - Avoid friction and shearing  - Provide appropriate hygiene as needed including keeping skin clean and dry  - Evaluate need for skin moisturizer/barrier cream  - Collaborate with interdisciplinary team   - Patient/family teaching  - Consider wound care consult   Outcome: Progressing     Problem: Nutrition/Hydration-ADULT  Goal: Nutrient/Hydration intake appropriate for improving, restoring or maintaining nutritional needs  Description: Monitor and assess patient's nutrition/hydration status for malnutrition. Collaborate with interdisciplinary team and initiate plan and interventions as ordered. Monitor patient's weight and dietary intake as ordered or per policy. Utilize nutrition screening tool and intervene as necessary. Determine patient's food preferences and provide high-protein, high-caloric foods as appropriate.      INTERVENTIONS:  - Monitor oral intake, urinary output, labs, and treatment plans  - Assess nutrition and hydration status and recommend course of action  - Evaluate amount of meals eaten  - Assist patient with eating if necessary   - Allow adequate time for meals  - Recommend/ encourage appropriate diets, oral nutritional supplements, and vitamin/mineral supplements  - Order, calculate, and assess calorie counts as needed  - Recommend, monitor, and adjust tube feedings and TPN/PPN based on assessed needs  - Assess need for intravenous fluids  - Provide specific nutrition/hydration education as appropriate  - Include patient/family/caregiver in decisions related to nutrition  Outcome: Progressing

## 2023-09-25 NOTE — ASSESSMENT & PLAN NOTE
Wt Readings from Last 3 Encounters:   09/25/23 78.9 kg (173 lb 15.1 oz)   09/07/23 74.6 kg (164 lb 6.4 oz)   08/30/23 73.3 kg (161 lb 9.6 oz)     Lab Results   Component Value Date    LVEF 60 08/28/2023     (H) 09/13/2023     (H) 08/25/2023     • Volume status: Euvolemic. • POA physical (limited): JVD-, HJR-, B/L LE trace to 1+ edema. • Echo 8/28/23: LVEF 60%. D1DD. • CXR 9/22: Persistent pulmonary venous congestion with small effusions and bibasilar atelectasis.  IV lasix given with even I/O.       Plan:  • CMP, magnesium; Goal Mg > 2 and K > 4; Replete prn  • HOB > 30°, Daily standing weights, Measure I/O  • Has been diuresised still not a clear negative balance

## 2023-09-25 NOTE — PROGRESS NOTES
09/25/23 1600   Clinical Encounter Type   Visited With Patient   Crisis Visit Critical Care   Referral From    Sabianism Encounters   Sabianism Needs Prayer; Sacred Text;Literature   Patient Spiritual Encounters   Spiritual Assessment 5   Suffering Severity 3   Fear Level 4   Coping 5   Spiritual Encounter Notes Pt had requested large print Bible, per CM. The largest print version I have in stock is a copy of the Psalms and the [address] ([address] [place] [address]), which I provided to her. I also asked ICU RN's if they had a NICHE supply of magnifying glasses (Pt does not have her spectacles) and they provided me with one, along with a daily devotional book which were given to Pt. Pt. expressed pleasure and gratitude, though she is traeched, so it was non-verbal.  I have visited with and prayed with Pt twice before.

## 2023-09-25 NOTE — PLAN OF CARE
Problem: MOBILITY - ADULT  Goal: Maintain or return to baseline ADL function  Description: INTERVENTIONS:  -  Assess patient's ability to carry out ADLs; assess patient's baseline for ADL function and identify physical deficits which impact ability to perform ADLs (bathing, care of mouth/teeth, toileting, grooming, dressing, etc.)  - Assess/evaluate cause of self-care deficits   - Assess range of motion  - Assess patient's mobility; develop plan if impaired  - Assess patient's need for assistive devices and provide as appropriate  - Encourage maximum independence but intervene and supervise when necessary  - Involve family in performance of ADLs  - Assess for home care needs following discharge   - Consider OT consult to assist with ADL evaluation and planning for discharge  - Provide patient education as appropriate  Outcome: Progressing  Goal: Maintains/Returns to pre admission functional level  Description: INTERVENTIONS:  - Perform BMAT or MOVE assessment daily.   - Set and communicate daily mobility goal to care team and patient/family/caregiver. - Collaborate with rehabilitation services on mobility goals if consulted  - Perform Range of Motion 3 times a day. - Reposition patient every 2 hours.   - Dangle patient 3 times a day  - Stand patient 3 times a day  - Ambulate patient 3 times a day  - Out of bed to chair 3 times a day   - Out of bed for toileting  - Record patient progress and toleration of activity level   Outcome: Progressing     Problem: Prexisting or High Potential for Compromised Skin Integrity  Goal: Skin integrity is maintained or improved  Description: INTERVENTIONS:  - Identify patients at risk for skin breakdown  - Assess and monitor skin integrity  - Assess and monitor nutrition and hydration status  - Monitor labs   - Assess for incontinence   - Turn and reposition patient  - Assist with mobility/ambulation  - Relieve pressure over bony prominences  - Avoid friction and shearing  - Provide appropriate hygiene as needed including keeping skin clean and dry  - Evaluate need for skin moisturizer/barrier cream  - Collaborate with interdisciplinary team   - Patient/family teaching  - Consider wound care consult   Outcome: Progressing     Problem: Nutrition/Hydration-ADULT  Goal: Nutrient/Hydration intake appropriate for improving, restoring or maintaining nutritional needs  Description: Monitor and assess patient's nutrition/hydration status for malnutrition. Collaborate with interdisciplinary team and initiate plan and interventions as ordered. Monitor patient's weight and dietary intake as ordered or per policy. Utilize nutrition screening tool and intervene as necessary. Determine patient's food preferences and provide high-protein, high-caloric foods as appropriate.      INTERVENTIONS:  - Monitor oral intake, urinary output, labs, and treatment plans  - Assess nutrition and hydration status and recommend course of action  - Evaluate amount of meals eaten  - Assist patient with eating if necessary   - Allow adequate time for meals  - Recommend/ encourage appropriate diets, oral nutritional supplements, and vitamin/mineral supplements  - Order, calculate, and assess calorie counts as needed  - Recommend, monitor, and adjust tube feedings and TPN/PPN based on assessed needs  - Assess need for intravenous fluids  - Provide specific nutrition/hydration education as appropriate  - Include patient/family/caregiver in decisions related to nutrition  Outcome: Progressing

## 2023-09-25 NOTE — PROGRESS NOTES
OTOLARYNGOLOGY PROGRESS NOTE    Date of Service: 9/25/2023 3:37 PM    HPI  Patient is a 69 y.o. female w/ recent COVID/pneumonia requiring admission for infectious control earlier this yr, recently dc, [facility] on 9/13 due to SOB and hypoxia, w/ limited oral cavity space observed. Pt agreed to intubation for airway management on 9/14. CT imaging demonstrated L naso/oropharyngeal mass w/ some level 3/4 neck LAD, new R middle lobe patchy consolidation. Daughter at bedside answered questions, including pt is an active tobacco user for many yr, and has complained of worsening difficulty w/ breathing, "gum swelling," and discomfort at back of mouth for past 1.5-2wk. Pt's cause of acute difficult airway was attributed to angioedema due to lisinopril, w/ previous "angioedema" episode in 2020. Ddx being cancer vs infectious etiologies. Overnight Events: POD 8 s/p trach. Patient with some mucous plugging last night. Afebrile  HR well controlled  Intermittently hypertensive  Saturating well on HFNC / trach mask    PHYSICAL EXAM:  Vitals:    09/25/23 1500   BP: 169/82   Pulse: 79   Resp: 12   Temp: 98.8 °F (37.1 °C)   SpO2: 95%        General: Resting calming in the chair watching television  HEENT:  7 cuffed proximal XLT tracheostomy in place  Neurology: Grossly CN I - XII intact. Lungs:  On trach mask  Cardio: RRR, well perfused  Abd: soft, NT, ND       Intake/Output Summary (Last 24 hours) at 9/25/2023 1537  Last data filed at 9/25/2023 1200  Gross per 24 hour   Intake 1788 ml   Output 1675 ml   Net 113 ml         LABORATORY    Recent Labs     09/23/23  0529 09/24/23  0500 09/25/23  0539   WBC 9.72 13.95* 11.23*   HGB 10.6* 11.0* 11.2*   HCT 32.8* 34.2* 34.7*    221 204       Recent Labs     09/22/23  2248 09/23/23  0529 09/24/23  0500 09/25/23  0539   K 3.6 3.4* 3.6 3.4*    104 104 106   BUN 23 24 28* 28*   PHOS 3.3 2.5 2.1* 2.2*   MG  --  2.1 2.0 1.9       Invalid input(s): "PTPAT", "PTINR", "APTTMNNM", "APTTPAT"      Patient Active Problem List   Diagnosis   • Benign essential hypertension   • Bipolar I disorder, single manic episode (HCC)   • Disc degeneration, lumbar   • 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   • Hyperlipidemia   • Lumbar radiculopathy   • Myofascial pain syndrome   • Pain syndrome, chronic   • Osteoarthritis of spine with radiculopathy, lumbar region   • Angio-edema, initial encounter   • Abnormal computed tomography angiography (CTA)   • Bipolar disorder, unspecified (HCC)   • Nicotine dependence   • Bone lesion   • CKD (chronic kidney disease) stage 3, GFR 30-59 ml/min (HCC)   • Acute respiratory failure with hypoxia secondary to oropharyngeal mass   • (HFpEF) heart failure with preserved ejection fraction (HCC)   • Pulmonary embolism (HCC)   • Vaginal itching   • Ambulatory dysfunction   • Angioedema   • Oropharyngeal mass   • Blister (nonthermal) of left forearm, sequela   • COPD without exacerbation (HCC)   • Large cell lymphoma (HCC)   • Chemotherapy induced neutropenia (HCC)   • Chronic Superficial thrombophlebitis         ASSESSMENT  Patient is a 69 y.o. female w/ acute and chronic problems as above, w/ naso/oropharyngeal mass observed on CT, likely inducing diminished airway space, requiring intubation for airway protection, now POD 8 s/p trach. PLAN  - Discussed with critical care team that due to mucous plugging last night, we will wait for an additional night of observation. If patient does well tonight, we will transition to cuffless tracheostomy tube. - Recommend speech evaluation for both swallow and PMV capability once we transition to cuffless tracheostomy tube. - Nutrition requirement is dictated by her swallowing capability after the tracheostomy tube change. - Biopsy consistent with lymphoma. Management per medical oncology. Ky Husain. Yue Shore MD PGY-3  [facility] Otolaryngology - Head and Neck Surgery  Available on [place] Text.   Please contact ENT Resident Tiger Text Role for any questions or concerns.

## 2023-09-25 NOTE — ASSESSMENT & PLAN NOTE
• POA with acute respiratory failure, SOB. • On physical in [facility] ED: Swollen tongue, swelling soft and hard palate and almost the head of all phalanx is completely closed. Severe angioedema. • Decadron 10 mg, Benadryl 25 mg given. • Initially refused but eventually agreed with intubation. • Prior episode of angioedema in 2020 noted. Suspected coadministration of increase the dose of tramadol and lisinopril. • Per daughter, there is no recent change in medications except Trelegy inhaler, cefdinir. 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       Plan:  • Trach placed 9/17

## 2023-09-25 NOTE — ASSESSMENT & PLAN NOTE
• POA with O2 saturate around 80% on room air. Needed high flow 60 L to bring up her O2 saturation to 95%. • Recent hospitalization for respiratory failure second to pneumonia. • CTA PE study negative for PE. New onset RML PNA. • Persistent partial atelectasis of the lower lobes noted. • Current daily smoker.       Plan:  • Tracheostomy 9/17. • Completed Decadron. • Atrovent/Xopenex  • Airway clearance protocol.  IS.  • Added 3% saline nebs

## 2023-09-25 NOTE — ASSESSMENT & PLAN NOTE
· Well controlled at home. · Home meds: Amlodipine 10 mg daily, Coreg 25 mg twice daily, lisinopril 10 mg daily. · Elevated BP may second to anxiety from chemotherapy/new diagnosis lymphoma, chronic pain. Plan:  · Discontinue Coreg 2/2 interactions with chemo regimen. · Amlodipine 10 mg daily. Lisinopril 20 mg daily. · PRN hydralazine if BP >160.   · Consider increasing PO regimen given persistent hypertension

## 2023-09-25 NOTE — SOCIAL WORK
Palliative LSW saw patient at the bedside today. LSW appreciates the opportunity to provide patient/family with inpatient emotional support and guidance while patient continues to receive medical attention from the medical team.     Topics discussed: [facility] team met with pt at bedside for continued support. Pt denies any pain or other symptoms causing discomfort or side-effects from chemo tx. Her only concern currently is the frustration with the amount of mucous she has from her trach. Pt able to communicate by writing "it is so hard" in reflection of her medical status/what she has gone through during this hospitalization. Emotional support and validation of pt's feelings provided. Pt was able to state that she had numerous family members come to visit with her over the weekend which brought her happiness. Her only request today is for a large-print Bible. TT sent to hospital  to request for pt if available. Pt expressed appreciation for visit and denies any other questions/needs at this time. Areas that need follow-up: Emotional Support  Resources given: None  Others present: [facility] team      I have spent 30 minutes with Patient  today in which greater than 50% of this time was spent in counseling/coordination of care regarding Counseling / Coordination of care.        LSW will continue to follow as requested by the medical team, patient, or family

## 2023-09-25 NOTE — PROGRESS NOTES
Progress Note - Palliative & Supportive Care  [facility] SERVICE  69 y.o.  female  ICU 03/ICU 03   MRN: [id number]  Encounter: [id number]     Assessment  Acute respiratory failure with hypoxia  HFpEF  Ambulatory dysfunction  New onset right middle lobe pneumonia  PO  Oropharyngeal mass  Goals of care counseling  Palliative care encounter    Plan  1. Symptom Management  · Per ICU team at this time  · Can consider anticholinergic to reduce secretions if they persist    2. Goals of Care  • Level 1 code status  • Continue disease directed therapies without limits  • Patient has started chemotherapy and attempts being made to wean off vent  • PEG tube being considered  • Goals of care discussions will be ongoing as clinical situation evolves    3. Social Support  • Patient supported by her son Yvrose Sood and daughter Angeles Nunn. She is   • Emotional support provided to patient as she writes down "it is so hard" with what she has gone through in the hospital thus far  • Patient finds comfort in her beliefs and is watching Taoist video on her phone upon encounter today.  has been visiting. • Palliative LSW following     4. Follow-up  • Palliative care will continue to follow and goals of care conversations will be ongoing. Please reach out with any questions or concerns. 24 Hour History  Chart reviewed before visit. Per chart review, no acute events overnight. Patient has started inpatient chemotherapy. She has continued with oxycodone 2.5-5 mg oral solution PRN moderate-severe pain. She has used 15 mg oxycodone in past 24 hrs. Primary team managing symptoms at this time. Upon my encounter today, patient seen and examined at bedside. She is watching video on her phone today upon encounter and appears comfortable and in no acute distress. She is able to mouth words, but writes down most of the conversation. She denies any pain, nausea, vomiting, or other symptoms.  She denies any side effects from chemotherapy at this time. She writes "it is so hard" with what she has gone through thus far in the hospital and describes the most bothersome thing today is the mucous in her trach. Her family visited over the weekend which she enjoyed and smiles thinking back on. She reports she wishes for large print bible and for  visit. Notified  of this request. She is appreciative of visit and has no other questions or concerns today.        Review of Systems   Unable to perform ROS: Acuity of condition       Medications    Current Facility-Administered Medications:   •  acetaminophen (TYLENOL) tablet 650 mg, 650 mg, Oral, Q6H PRN, Merle Rosario MD, 650 mg at 09/20/23 2154  •  acyclovir (ZOVIRAX) capsule 400 mg, 400 mg, Oral, BID, Claudia Burks MD, 400 mg at 09/25/23 3260  •  allopurinol (ZYLOPRIM) tablet 300 mg, 300 mg, Oral, Daily, Claudia Burks MD, 300 mg at 09/25/23 0818  •  alteplase (CATHFLO) injection 2 mg, 2 mg, Intracatheter, Q1MIN PRN, Claudia Burks MD  •  amLODIPine (NORVASC) tablet 10 mg, 10 mg, Oral, Daily, Seldon Mj, DO, 10 mg at 09/25/23 0815  •  busPIRone (BUSPAR) tablet 30 mg, 30 mg, Oral, TID, Redia Felty, MD, 30 mg at 09/25/23 0825  •  chlorhexidine (PERIDEX) 0.12 % oral rinse 15 mL, 15 mL, Mouth/Throat, Q12H [address], Redia Felty, MD, 15 mL at 09/25/23 0814  •  [START ON 9/26/2023] cyclophosphamide (CYTOXAN) 1,350 mg in sodium chloride 0.9 % 250 mL IVPB, 750 mg/m2 (Treatment Plan Recorded), Intravenous, Once, Claudia Burks MD  •  DOXOrubicin (ADRIAMYCIN) 18 mg, etoposide (TOPOSAR) 90 mg, vinCRIStine (ONCOVIN) 0.7 mg in sodium chloride 0.9 % 500 mL infusion, , Intravenous, Q24H, Claudia Burks MD, Last Rate: 23 mL/hr at 09/24/23 1032, New Bag at 09/24/23 1032  •  enoxaparin (LOVENOX) subcutaneous injection 40 mg, 40 mg, Subcutaneous, Q24H [address], Redia Felty, MD, 40 mg at 09/25/23 0815  •  famotidine (PEPCID) tablet 20 mg, 20 mg, Oral, BID, Leeanna Jain DO, 20 mg at 09/25/23 0815  •  fentanyl citrate (PF) 100 MCG/2ML 50 mcg, 50 mcg, Intravenous, Q2H PRN, Enio German MD, 50 mcg at 09/19/23 0020  •  fluconazole (DIFLUCAN) tablet 400 mg, 400 mg, Oral, Daily, Elsa Causey MD, 400 mg at 09/25/23 5856  •  gabapentin (NEURONTIN) capsule 300 mg, 300 mg, Oral, TID, Enio German MD, 300 mg at 09/25/23 8967  •  guaiFENesin (ROBITUSSIN) oral liquid 200 mg, 200 mg, Oral, Q6H, RANDEE Sanchez, 200 mg at 09/25/23 6642  •  hydrALAZINE (APRESOLINE) injection 10 mg, 10 mg, Intravenous, Q4H PRN, Lawrence Palomino MD, 10 mg at 09/25/23 0759  •  levalbuterol (Tharon Economy) inhalation solution 1.25 mg, 1.25 mg, Nebulization, Q6H, 1.25 mg at 09/25/23 0733 **AND** ipratropium (ATROVENT) 0.02 % inhalation solution 0.5 mg, 0.5 mg, Nebulization, Q6H, Eino German MD, 0.5 mg at 09/25/23 4196  •  levalbuterol (Tharon Economy) inhalation solution 1.25 mg, 1.25 mg, Nebulization, Q8H PRN, Enio German MD, 1.25 mg at 09/22/23 2318  •  levofloxacin (LEVAQUIN) tablet 500 mg, 500 mg, Oral, Q24H [address], Elsa Causey MD, 500 mg at 09/25/23 4266  •  lisinopril (ZESTRIL) tablet 20 mg, 20 mg, Oral, Daily, Pierre Bird PA-C, 20 mg at 09/25/23 0815  •  nicotine (NICODERM CQ) 21 mg/24 hr TD 24 hr patch 21 mg, 21 mg, Transdermal, Daily, Enio German MD, 21 mg at 09/25/23 0816  •  nystatin (MYCOSTATIN) oral suspension 500,000 Units, 500,000 Units, Swish & Swallow, 4x Daily, Enio German MD, 500,000 Units at 09/25/23 0814  •  ondansetron (ZOFRAN) 16 mg, dexamethasone (DECADRON) 10 mg in sodium chloride 0.9 % 50 mL IVPB, , Intravenous, Q24H, Elsa Causey MD, Last Rate: 150 mL/hr at 09/24/23 0837, New Bag at 09/24/23 0837  •  ondansetron (ZOFRAN) injection 4 mg, 4 mg, Intravenous, Q8H PRN, Elsa Causey MD  •  oxyCODONE (ROXICODONE) oral solution 2.5 mg, 2.5 mg, Oral, Q4H PRN, 2.5 mg at 09/25/23 0823 **OR** oxyCODONE (ROXICODONE) oral solution 5 mg, 5 mg, Oral, Q4H PRN, RANDEE Sanchez, 5 mg at 09/23/23 1713  •  oxyCODONE (ROXICODONE) oral solution 5 mg, 5 mg, Oral, Q8H, Ángel Thurston MD, 5 mg at 09/25/23 0531  •  [COMPLETED] polyethylene glycol (MIRALAX) packet 17 g, 17 g, Oral, Once, 17 g at 09/16/23 1053 **FOLLOWED BY** polyethylene glycol (MIRALAX) packet 17 g, 17 g, Oral, Daily PRN, Clotilde North MD, 17 g at 09/24/23 1121  •  potassium chloride 20 mEq IVPB (premix), 20 mEq, Intravenous, Once, Syrmo, RANDEE, Last Rate: 50 mL/hr at 09/25/23 0753, 20 mEq at 09/25/23 0753  •  predniSONE tablet 100 mg, 60 mg/m2 (Treatment Plan Recorded), Oral, BID With Meals, Estee Christopher MD, 100 mg at 09/25/23 6587  •  senna oral syrup 8.8 mg, 8.8 mg, Per NG Tube, HS, Ángel Thurston MD, 8.8 mg at 09/23/23 2151  •  sertraline (ZOLOFT) tablet 50 mg, 50 mg, Oral, Daily, Clotilde North MD, 50 mg at 09/25/23 0814  •  [START ON 9/26/2023] sodium chloride 0.9 % bolus 2,000 mL, 2,000 mL, Intravenous, Once, Estee Christopher MD  •  sodium chloride 0.9 % infusion, 20 mL/hr, Intravenous, Daily PRN, Estee Christopher MD, Last Rate: 20 mL/hr at 09/24/23 0837, 20 mL/hr at 09/24/23 0837  •  sodium chloride 3 % inhalation solution 4 mL, 4 mL, Nebulization, Q6H, RANDEE Ellis, 4 mL at 09/25/23 3199  •  sulfamethoxazole-trimethoprim (BACTRIM DS) 800-160 mg per tablet 1 tablet, 1 tablet, Oral, Once per day on Mon Wed Fri, Estee Christopher MD, 1 tablet at 09/25/23 0835    Objective  BP (!) 193/97   Pulse 99   Temp 98.4 °F (36.9 °C) (Axillary)   Resp 17   Ht 5' 1" (1.549 m)   Wt 78.9 kg (173 lb 15.1 oz)   SpO2 95%   BMI 32.87 kg/m²   Physical Exam:   Constitutional: Comfortable and in no acute distress. Head: Normocephalic and atraumatic. Eyes: EOM are normal. No ocular discharge. No scleral icterus. Neck: no visible adenopathy or masses  Cardiac: Normal rate   Respiratory: Effort normal. No stridor. No respiratory distress. No cough.  Trach in place with mucous draining   Gastrointestinal: No abdominal distension. Musculoskeletal: No edema. Neurological: Alert, oriented x3   Skin: Dry, no diaphoresis. Psychiatric: Calm with no signs of agitation    Lab Results:   I have personally reviewed pertinent labs. , CBC:   Lab Results   Component Value Date    WBC 11.23 (H) 09/25/2023    HGB 11.2 (L) 09/25/2023    HCT 34.7 (L) 09/25/2023    MCV 97 09/25/2023     09/25/2023    RBC 3.57 (L) 09/25/2023    MCH 31.4 09/25/2023    MCHC 32.3 09/25/2023    RDW 15.3 (H) 09/25/2023    MPV 9.6 09/25/2023   , CMP:   Lab Results   Component Value Date    SODIUM 139 09/25/2023    K 3.4 (L) 09/25/2023     09/25/2023    CO2 30 09/25/2023    BUN 28 (H) 09/25/2023    CREATININE 0.72 09/25/2023    CALCIUM 8.8 09/25/2023    AST 13 09/25/2023    ALT 17 09/25/2023    ALKPHOS 51 09/25/2023    EGFR 85 09/25/2023     Imaging Studies: I have personally reviewed pertinent reports. EKG, Pathology, and Other Studies: I have personally reviewed pertinent reports. Counseling / Coordination of Care  Total floor / unit time spent today 40 minutes. Greater than 50% of total time was spent with the patient and / or family counseling and / or coordinating of care. A description of the counseling / coordination of care: Chart reviewed, provided medical updates, determined goals of care, discussed palliative care and symptom management, determined competency and POA/HCA, determined social/family support, provided psychosocial support. privacy exception: note includes other individuals    Moshe Thomas PA-C  Palliative & Supportive Care    Portions of this document may have been created using dictation software and as such some "sound alike" terms may have been generated by the system. Do not hesitate to contact me with any questions or clarifications.

## 2023-09-25 NOTE — TELEMEDICINE
e-Consult (IPC)  - Interventional Radiology  [facility] 69 y.o. female MRN: [id number]  Unit/Bed#: ICU 03 Encounter: [id number]          Interventional Radiology has been consulted to evaluate [facility]    We were consulted by ICU service concerning this patient with oropharyngeal B cell lymphoma. 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 Prior CT of the abdomen and pelvis was reviewed, which showed a small window to the stomach, with the stomach under distended. We will plan for gastrostomy tube placement this Wednesday. Please make patient NPO after midnight tomorrow evening. 5-10 minutes, >50% of the total time devoted to medical consultative verbal/EMR discussion between providers. Written report will be generated in the EMR. Thank you for allowing Interventional Radiology to participate in the care of [facility]. Please don't hesitate to call or TigerText us with any questions.      Kemar Santa MD

## 2023-09-25 NOTE — PROGRESS NOTES
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 CT AP with no lymphadenopathy; Brain MRI no lesion; stage II Large B Cell Lymphoma. She started her dose-adjusted systemic treatment, R-EPOCH, on 9/22/23 with the last dose being today, 9/25/23. She is scheduled to have Rituxan infusion tomorrow at 9/26/23. Her CMP and uric acid have been unremarkable for tumor lysis. PICC line intact. Patient is planned to have peg tube placement while inpatient. Overall, patient tolerating chemotherapy very well. She was up on her chair reading the bible when approached this afternoon; able to do written communication and by gently whispering responses. Assessment and Plan  1. 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 Left Jugular Lymphadenopathy     Started systemic treatment: EPOCH-R Cycle 1 day 1-3: Etoposide, Doxorubicin, Vincristine , Cyclophosphamide , prednisone + Rituximab (9/22/23-9/26/23)    CMP  Uric acid 2.4 < 2.7 < 2.8  Potassium 3.4 < 3.6 < 3.4  Calcium 838 < 8.8   Total Bili 0.30  Phosphorus 2.2 < 2.1 < 2.5    CBC  Hemoglobin 11.2 < 10.6 < 10.9  WBC 11.23 < 13.95 < 9.72  Platelets 499 < 398 < 181    PLAN:  1) Today is the day 3/3 for Cycle 1 EPOCH (etoposidevincristine, cyclophosphamide, doxorubicin) . Proceed with Rituxamab infusion tomorrow, 9/26/23. 2) Continue to monitor for tumor lysis syndrome. CMP unremarkable, uric acid 2.4. Daily CBCs, CMP, uric acid, phosphorus    3) Continue to monitor renal and hepatic function (etoposide, cyclophosphamide).     4) Prophylaxis medications in place    5) Outpatient follow up plan: Dr. Nicholas Cardenas 10/03/23     Subjective:    Review of Systems   Constitutional: Negative for chills, diaphoresis and fever. HENT: Negative for ear pain. Neck mass    Eyes: Negative for pain and visual disturbance. Respiratory: Negative for cough, chest tightness, shortness of breath and wheezing. Cardiovascular: Negative for chest pain and palpitations. Gastrointestinal: Negative for abdominal pain, blood in stool, diarrhea, nausea and vomiting. Genitourinary: Negative for difficulty urinating and hematuria. Musculoskeletal: Negative for back pain. Neurological: Negative for dizziness, weakness and headaches. Psychiatric/Behavioral: Negative for agitation.      Objective:     Medication Administration - last 24 hours from 09/24/2023 1436 to 09/25/2023 1436       Date/Time Order Dose Route Action Action by     09/25/2023 0814 EDT chlorhexidine (PERIDEX) 0.12 % oral rinse 15 mL 15 mL Mouth/Throat Given Wilber Pritchard RN     09/24/2023 2117 EDT chlorhexidine (PERIDEX) 0.12 % oral rinse 15 mL 15 mL Mouth/Throat Given [facility]     09/25/2023 0825 EDT busPIRone (BUSPAR) tablet 30 mg 30 mg Oral Given Yossi TINO Pritchard     09/24/2023 2117 EDT busPIRone (BUSPAR) tablet 30 mg 30 mg Oral Given [facility]     09/24/2023 1658 EDT busPIRone (BUSPAR) tablet 30 mg 30 mg Oral Given Bishop Hyde RN     09/25/2023 4766 EDT sertraline (ZOLOFT) tablet 50 mg 50 mg Oral Given Yossi TINO Pritchard     09/25/2023 1241 EDT nystatin (MYCOSTATIN) oral suspension 500,000 Units 500,000 Units Swish & Swallow Given Yossi TINO Pritchard     09/25/2023 0501 EDT nystatin (MYCOSTATIN) oral suspension 500,000 Units 500,000 Units Swish & Swallow Given Yossi TINO Pritchard     09/24/2023 2117 EDT nystatin (MYCOSTATIN) oral suspension 500,000 Units 500,000 Units Swish & Swallow Given [facility]     09/24/2023 1701 EDT nystatin (MYCOSTATIN) oral suspension 500,000 Units 500,000 Units Swish & Swallow Given Bishop Hyde RN     09/25/2023 1349 EDT levalbuterol Peggy Bonner) inhalation solution 1.25 mg 1.25 mg Nebulization Given Donaldo Guaman, RT     09/25/2023 3604 EDT levalbuterol (XOPENEX) inhalation solution 1.25 mg 1.25 mg Nebulization Given Bg Lamar, RT     09/25/2023 0200 EDT levalbuterol (Trevon Pilling) inhalation solution 1.25 mg -- Nebulization Canceled Entry [facility], RT     09/25/2023 0009 EDT levalbuterol (Trevon Pilling) inhalation solution 1.25 mg 1.25 mg Nebulization Given [facility], RT     09/24/2023 2030 EDT levalbuterol (Trevon Pilling) inhalation solution 1.25 mg 1.25 mg Nebulization Given [facility], RT     09/25/2023 1349 EDT ipratropium (ATROVENT) 0.02 % inhalation solution 0.5 mg 0.5 mg Nebulization Given Donaldo Guaman, RT     09/25/2023 0733 EDT ipratropium (ATROVENT) 0.02 % inhalation solution 0.5 mg 0.5 mg Nebulization Given [facility], RT     09/25/2023 0200 EDT ipratropium (ATROVENT) 0.02 % inhalation solution 0.5 mg -- Nebulization Canceled Entry [facility], RT     09/25/2023 0009 EDT ipratropium (ATROVENT) 0.02 % inhalation solution 0.5 mg 0.5 mg Nebulization Given [facility], RT     09/24/2023 2030 EDT ipratropium (ATROVENT) 0.02 % inhalation solution 0.5 mg 0.5 mg Nebulization Given [facility], RT     09/25/2023 5453 EDT gabapentin (NEURONTIN) capsule 300 mg 300 mg Oral Given Shantanu Torrez RN     09/24/2023 2117 EDT gabapentin (NEURONTIN) capsule 300 mg 300 mg Oral Given [place]     09/24/2023 1658 EDT gabapentin (NEURONTIN) capsule 300 mg 300 mg Oral Given Nancy Brandt RN     09/25/2023 0816 EDT nicotine (NICODERM CQ) 21 mg/24 hr TD 24 hr patch 21 mg 21 mg Transdermal Medication Applied Shantanu Torrez RN     09/25/2023 0815 EDT nicotine (NICODERM CQ) 21 mg/24 hr TD 24 hr patch 21 mg 21 mg Transdermal Patch Removed Shantanu Torrez RN     09/25/2023 0815 EDT enoxaparin (LOVENOX) subcutaneous injection 40 mg 40 mg Subcutaneous Given Shantanu Torrez RN     09/25/2023 0815 EDT amLODIPine (NORVASC) tablet 10 mg 10 mg Oral Given Judy Han Trupti Bates, RN     09/24/2023 2116 EDT senna oral syrup 8.8 mg 8.8 mg Per NG Tube Not Given Jorge Oris     09/25/2023 1401 EDT guaiFENesin (ROBITUSSIN) oral liquid 200 mg 200 mg Oral Given Yonathan Schneider, RN     09/25/2023 2049 EDT guaiFENesin (ROBITUSSIN) oral liquid 200 mg 200 mg Oral Given [facility] Jennie, RN     09/25/2023 0249 EDT guaiFENesin (ROBITUSSIN) oral liquid 200 mg 200 mg Oral Given [facility]     09/24/2023 2117 EDT guaiFENesin (ROBITUSSIN) oral liquid 200 mg 200 mg Oral Given Jorge Oris     09/25/2023 1558 EDT oxyCODONE (ROXICODONE) oral solution 2.5 mg 2.5 mg Oral Given Yonathan Schneider, RN     09/25/2023 6606 EDT oxyCODONE (ROXICODONE) oral solution 5 mg -- Oral See Alternative Yonathan Schneider, RN     09/25/2023 0815 EDT famotidine (PEPCID) tablet 20 mg 20 mg Oral Given Yonathan Schneider, RN     09/24/2023 1701 EDT famotidine (PEPCID) tablet 20 mg 20 mg Oral Given yrl Butteryair, RN     09/25/2023 0818 EDT allopurinol (ZYLOPRIM) tablet 300 mg 300 mg Oral Given Yonathanyair Schneider, RN     09/25/2023 6914 EDT levofloxacin (LEVAQUIN) tablet 500 mg 500 mg Oral Given Yonathan Schneider, RN     09/25/2023 9844 EDT fluconazole (DIFLUCAN) tablet 400 mg 400 mg Oral Given Yonathan Schneider, RN     09/25/2023 9183 EDT acyclovir (ZOVIRAX) capsule 400 mg 400 mg Oral Given Yonathan Schneider, RN     09/24/2023 1701 EDT acyclovir (ZOVIRAX) capsule 400 mg 400 mg Oral Given yrl Shraddha, RN     09/25/2023 7268 EDT sulfamethoxazole-trimethoprim (BACTRIM DS) 800-160 mg per tablet 1 tablet 1 tablet Oral Given Yonathan Schneider, RN     09/25/2023 6174 EDT sodium chloride 0.9 % infusion 20 mL/hr Intravenous [address]  Zeyad Pérez, RN     09/25/2023 8668 EDT predniSONE tablet 100 mg 100 mg Oral Given Yonathan Schneider, TINO     09/24/2023 1658 EDT predniSONE tablet 100 mg 100 mg Oral Given Emelia Llanes, TINO     09/25/2023 0941 EDT ondansetron (ZOFRAN) 16 mg, dexamethasone (DECADRON) 10 mg in sodium chloride 0.9 % 50 mL IVPB -- Intravenous [place] Bon Butler, RN     09/25/2023 1018 EDT DOXOrubicin (ADRIAMYCIN) 18 mg, etoposide (TOPOSAR) 90 mg, vinCRIStine (ONCOVIN) 0.7 mg in sodium chloride 0.9 % 500 mL infusion -- Intravenous [address] Bon Bulter, RN     09/25/2023 0815 EDT lisinopril (ZESTRIL) tablet 20 mg 20 mg Oral Given Kaylin Husain, TINO     09/25/2023 1401 EDT oxyCODONE (ROXICODONE) oral solution 5 mg 5 mg Oral Given Kaylin Husain RN     09/25/2023 9691 EDT oxyCODONE (ROXICODONE) oral solution 5 mg 5 mg Oral Given [facility]     09/24/2023 2117 EDT oxyCODONE (ROXICODONE) oral solution 5 mg 5 [facility]     09/24/2023 1658 EDT furosemide (LASIX) injection 40 mg 40 mg Intravenous Given Shanta De Guzman, TINO     09/25/2023 5230 EDT hydrALAZINE (APRESOLINE) injection 10 mg 10 mg Intravenous Given Kaylin Husain RN     09/25/2023 0249 EDT hydrALAZINE (APRESOLINE) injection 10 mg 10 mg [facility]     09/24/2023 2252 EDT hydrALAZINE (APRESOLINE) injection 10 mg 10 mg [facility]     09/24/2023 1712 EDT hydrALAZINE (APRESOLINE) injection 10 mg 10 mg Intravenous Given Shanta De Guzman RN     09/25/2023 0949 EDT labetalol (NORMODYNE) injection 10 mg 10 mg Intravenous Not Given Florene Osler     09/25/2023 0314 EDT hydrALAZINE (APRESOLINE) injection 10 mg 10 mg Intravenous Given Florene Osler     09/25/2023 0408 EDT labetalol (NORMODYNE) injection 10 mg 10 mg Intravenous Given Florene Osler     09/25/2023 1349 EDT sodium chloride 3 % inhalation solution 4 mL 4 mL Nebulization Given RT Yodit     09/25/2023 0733 EDT sodium chloride 3 % inhalation solution 4 mL 4 mL Nebulization Given Meenu Lira, RT     09/25/2023 0753 EDT potassium chloride 20 mEq IVPB (premix) 20 mEq Intravenous [address] Kaylin Husain RN     09/25/2023 1241 EDT metoprolol tartrate (LOPRESSOR) tablet 25 mg 25 mg Oral Given Kaylin Husain, RN          BP 162/93   Pulse 80   Temp 97.8 °F (36.6 °C) (Axillary)   Resp 20   Ht 5' 1" (1.549 m)   Wt 78.9 kg (173 lb 15.1 oz)   SpO2 95%   BMI 32.87 kg/m²       Physical Exam  Constitutional:       Appearance: She is not ill-appearing. HENT:      Head: Normocephalic and atraumatic. Comments: Trach in place, humidified. NG tube R nostril     Right Ear: External ear normal.      Left Ear: External ear normal.      Nose: No congestion or rhinorrhea. Mouth/Throat:      Mouth: Mucous membranes are moist.   Eyes:      Extraocular Movements: Extraocular movements intact. Conjunctiva/sclera: Conjunctivae normal.      Pupils: Pupils are equal, round, and reactive to light. Neck:      Comments: trach in place   Cardiovascular:      Rate and Rhythm: Normal rate and regular rhythm. Pulses: Normal pulses. Heart sounds: Normal heart sounds. No murmur heard. No gallop. Pulmonary:      Effort: Pulmonary effort is normal. No respiratory distress. Breath sounds: No wheezing, rhonchi or rales. Abdominal:      General: Bowel sounds are normal. There is no distension. Palpations: There is no mass. Tenderness: There is no abdominal tenderness. There is no guarding. Musculoskeletal:      Right lower leg: No edema. Left lower leg: No edema. Skin:     General: Skin is warm. Capillary Refill: Capillary refill takes less than 2 seconds. Coloration: Skin is not jaundiced or pale. Findings: Rash present. Neurological:      General: No focal deficit present. Mental Status: She is alert and oriented to person, place, and time. Psychiatric:         Mood and Affect: Mood normal.         Behavior: Behavior normal.         Thought Content:  Thought content normal.         Judgment: Judgment normal.           Recent Results (from the past 48 hour(s))   CBC and differential    Collection Time: 09/24/23  5:00 AM   Result Value Ref Range    WBC 13.95 (H) 4.31 - 10.16 Thousand/uL    RBC 3.48 (L) 3.81 - 5.12 Million/uL    Hemoglobin 11.0 (L) 11.5 - 15.4 g/dL    Hematocrit 34.2 (L) 34.8 - 46.1 %    MCV 98 82 - 98 fL    MCH 31.6 26.8 - 34.3 pg    MCHC 32.2 31.4 - 37.4 g/dL    RDW 15.7 (H) 11.6 - 15.1 %    MPV 9.8 8.9 - 12.7 fL    Platelets 792 985 - 181 Thousands/uL   Comprehensive metabolic panel    Collection Time: 09/24/23  5:00 AM   Result Value Ref Range    Sodium 140 135 - 147 mmol/L    Potassium 3.6 3.5 - 5.3 mmol/L    Chloride 104 96 - 108 mmol/L    CO2 29 21 - 32 mmol/L    ANION GAP 7 mmol/L    BUN 28 (H) 5 - 25 mg/dL    Creatinine 0.76 0.60 - 1.30 mg/dL    Glucose 163 (H) 65 - 140 mg/dL    Calcium 8.8 8.4 - 10.2 mg/dL    Corrected Calcium 9.8 8.3 - 10.1 mg/dL    AST 15 13 - 39 U/L    ALT 17 7 - 52 U/L    Alkaline Phosphatase 58 34 - 104 U/L    Total Protein 6.1 (L) 6.4 - 8.4 g/dL    Albumin 2.7 (L) 3.5 - 5.0 g/dL    Total Bilirubin 0.30 0.20 - 1.00 mg/dL    eGFR 80 ml/min/1.73sq m   Phosphorus    Collection Time: 09/24/23  5:00 AM   Result Value Ref Range    Phosphorus 2.1 (L) 2.3 - 4.1 mg/dL   Uric acid    Collection Time: 09/24/23  5:00 AM   Result Value Ref Range    Uric Acid 2.7 2.0 - 7.5 mg/dL   LD,Blood    Collection Time: 09/24/23  5:00 AM   Result Value Ref Range     140 - 271 U/L   Magnesium    Collection Time: 09/24/23  5:00 AM   Result Value Ref Range    Magnesium 2.0 1.9 - 2.7 mg/dL   Calcium, ionized    Collection Time: 09/24/23  5:00 AM   Result Value Ref Range    Calcium, Ionized 1.19 1.12 - 1.32 mmol/L   Manual Differential(PHLEBS Do Not Order)    Collection Time: 09/24/23  5:00 AM   Result Value Ref Range    Segmented % 94 (H) 43 - 75 %    Bands % 2 0 - 8 %    Lymphocytes % 4 (L) 14 - 44 %    Monocytes % 0 (L) 4 - 12 %    Eosinophils, % 0 0 - 6 %    Basophils % 0 0 - 1 %    Absolute Neutrophils 13.39 (H) 1.85 - 7.62 Thousand/uL    Lymphocytes Absolute 0.56 (L) 0.60 - 4.47 Thousand/uL    Monocytes Absolute 0.00 0.00 - 1.22 Thousand/uL    Eosinophils Absolute 0.00 0.00 - 0.40 Thousand/uL    Basophils Absolute 0.00 0.00 - 0.10 Thousand/uL    Total Counted      RBC Morphology Normal     Platelet Estimate Adequate Adequate   CBC and differential    Collection Time: 09/25/23  5:39 AM   Result Value Ref Range    WBC 11.23 (H) 4.31 - 10.16 Thousand/uL    RBC 3.57 (L) 3.81 - 5.12 Million/uL    Hemoglobin 11.2 (L) 11.5 - 15.4 g/dL    Hematocrit 34.7 (L) 34.8 - 46.1 %    MCV 97 82 - 98 fL    MCH 31.4 26.8 - 34.3 pg    MCHC 32.3 31.4 - 37.4 g/dL    RDW 15.3 (H) 11.6 - 15.1 %    MPV 9.6 8.9 - 12.7 fL    Platelets 308 722 - 654 Thousands/uL   Comprehensive metabolic panel    Collection Time: 09/25/23  5:39 AM   Result Value Ref Range    Sodium 139 135 - 147 mmol/L    Potassium 3.4 (L) 3.5 - 5.3 mmol/L    Chloride 106 96 - 108 mmol/L    CO2 30 21 - 32 mmol/L    ANION GAP 3 mmol/L    BUN 28 (H) 5 - 25 mg/dL    Creatinine 0.72 0.60 - 1.30 mg/dL    Glucose 150 (H) 65 - 140 mg/dL    Calcium 8.8 8.4 - 10.2 mg/dL    Corrected Calcium 9.8 8.3 - 10.1 mg/dL    AST 13 13 - 39 U/L    ALT 17 7 - 52 U/L    Alkaline Phosphatase 51 34 - 104 U/L    Total Protein 5.9 (L) 6.4 - 8.4 g/dL    Albumin 2.8 (L) 3.5 - 5.0 g/dL    Total Bilirubin 0.30 0.20 - 1.00 mg/dL    eGFR 85 ml/min/1.73sq m   Phosphorus    Collection Time: 09/25/23  5:39 AM   Result Value Ref Range    Phosphorus 2.2 (L) 2.3 - 4.1 mg/dL   Uric acid    Collection Time: 09/25/23  5:39 AM   Result Value Ref Range    Uric Acid 2.4 2.0 - 7.5 mg/dL   LD,Blood    Collection Time: 09/25/23  5:39 AM   Result Value Ref Range     140 - 271 U/L   Magnesium    Collection Time: 09/25/23  5:39 AM   Result Value Ref Range    Magnesium 1.9 1.9 - 2.7 mg/dL       US right upper quadrant    Result Date: 9/22/2023  Narrative: RIGHT UPPER QUADRANT ULTRASOUND INDICATION:     CT finding N/V +Cancino.  COMPARISON: CT abdomen/pelvis 9/21/2023 TECHNIQUE:   Real-time ultrasound of the right upper quadrant was performed with a curvilinear transducer with both volumetric sweeps and still imaging techniques. FINDINGS: Evaluation limited as per sonographer's note in PACS. PANCREAS:  Visualized portions of the pancreas are within normal limits. AORTA AND IVC:  Visualized portions are normal for patient age. LIVER: Size:  Within normal range. The liver measures 15.3 cm in the midclavicular line. Contour:  Surface contour is smooth. Parenchyma:  Echogenicity and echotexture are within normal limits. No liver mass identified. Limited imaging of the main portal vein shows it to be patent and hepatopetal. BILIARY: The gallbladder is normal in caliber. Gallbladder wall thickness upper limits of normal. No pericholecystic fluid. There is gallbladder sludge without evidence for shadowing calculi. No sonographic Cancino sign. No intrahepatic biliary dilatation. CBD measures 4.0 mm. No choledocholithiasis. KIDNEY: Right kidney measures 11.9 x 5.2 x 6.4 cm. Volume 207.6 mL Several simple cysts, the largest of which measures 4.1 cm. 2.8 cm septated cyst in the upper pole. No hydronephrosis or perinephric collection. ASCITES:   None. Impression: No cholelithiasis. Gallbladder sludge is present with wall thickness upper limits of normal. Negative sonographic Cancino sign. Findings may be sequela of chronic gallbladder dysfunction. Consider follow-up with a HIDA scan if it would alter patient management. Workstation performed: [id number]     CT abdomen pelvis w contrast    Result Date: 9/21/2023  Narrative: CT ABDOMEN AND PELVIS WITH IV CONTRAST INDICATION:   Head/neck cancer, staging lymohoma staging prior to treatment with chemo. COMPARISON: 9/13/2023. TECHNIQUE:  CT examination of the abdomen and pelvis was performed. Multiplanar 2D reformatted images were created from the source data.  This examination, like all CT scans performed in the [facility], was performed utilizing techniques to minimize radiation dose exposure, including the use of iterative reconstruction and automated exposure control. Radiation dose length product (DLP) for this visit:  813 mGy-cm IV Contrast:  100 mL of iohexol (OMNIPAQUE) Enteric Contrast:  Enteric contrast was not administered. FINDINGS: Mildly degraded by respiratory motion. ABDOMEN LOWER CHEST: There is bibasilar atelectasis. LIVER/BILIARY TREE:  Unremarkable. GALLBLADDER:  No calcified gallstones. There may be mild gallbladder wall thickening though evaluation is degraded by respiratory motion. No surrounding inflammatory changes. SPLEEN:  Unremarkable. PANCREAS:  Unremarkable. ADRENAL GLANDS:  Unremarkable. KIDNEYS/URETERS: Multiple bilateral renal cysts. No hydronephrosis. STOMACH AND BOWEL: There is colonic diverticulosis without evidence of acute diverticulitis. APPENDIX:  No findings to suggest appendicitis. ABDOMINOPELVIC CAVITY:  No ascites. No pneumoperitoneum. No lymphadenopathy. VESSELS:  Unremarkable for patient's age. PELVIS REPRODUCTIVE ORGANS:  Unremarkable for patient's age. URINARY BLADDER:  Unremarkable. ABDOMINAL WALL/INGUINAL REGIONS:  Fat containing right inguinal hernia noted. Fat-containing umbilical hernia. OSSEOUS STRUCTURES: Again seen is a destructive lytic lesion in the T12 vertebral body with sclerotic borders, progressed from 2013 and with chronic appearing pathologic fracture. Impression: 1. No abdominal lymphadenopathy. 2.  Question gallbladder wall thickening versus motion artifact. If there is clinical concern for gallbladder pathology, ultrasound is recommended. 3.  Chronic T12 lytic lesion with pathologic fracture.  Workstation performed: [id number]     MRI soft tissue neck wo and w contrast    Result Date: 9/21/2023  Narrative: MRI OF THE SOFT TISSUES OF THE NECK - WITH AND WITHOUT CONTRAST INDICATION: Oropharyngeal mass s/p biopsy (+) for carcinoma COMPARISON: Neck CT from 9/14/2023 TECHNIQUE:  Multiplanar, multisequence imaging of the soft tissues of the neck was performed before and after gadolinium administration. . IV Contrast:  7.5 mL of Gadobutrol injection (SINGLE-DOSE) IMAGE QUALITY: Motion degrades images. FINDINGS: VISUALIZED BRAIN PARENCHYMA: No acute or suspicious findings. Please see today's brain MR report. VISUALIZED ORBITS AND PARANASAL SINUSES: Globes and orbits are within normal limits. Pan paranasal sinus mucosal thickening, greatest involving ethmoids and sphenoids. NASAL CAVITY, NASOPHARYNX, and OROHYPOPHARYNX and LARYNX: Approximately 7.5 x 4.0 x 7.8 cm (length X depth X width) soft tissue mass, epicenter likely the left lateral pharyngeal recess. Enhances and restricts diffusion. Central, irregular fluid signal intensity area without enhancement appears contiguous with fluid around an endotracheal tube, likely trapped secretions and circumferential mass. Mass extends from the left greater than right nasopharynx superiorly to the cricoid cartilage inferiorly. Extends to the posterior nasal cavity. Displaces the soft palate, uvula and tongue base anteriorly. Oral cavity is otherwise within normal limits. Displaces the left pterygoid muscles anterolaterally. Extends posterior to the angle of the mandible approximately 1.4 cm, abutting and mildly laterally displacing the deep parotid, inseparable from the gland. Effaces the left parapharyngeal fat. Discrete epiglottis not seen, grossly anteriorly displaced. Extends along the left aryepiglottic fold and posterior pharyngeal wall to the to the inferior supraglottic airway, grossly terminating just above or at the left false cord. Extends to the left carotid space, encircles the carotid with severe narrowing and posterior-lateral displacement of the distal cervical and most proximal petrous vessel. Otherwise preserved left internal carotid flow-void. Posterior-lateral displacement  and occlusion of the internal jugular vein at the level of the angle of the mandible in the distal neck.  Mass extends to the proximal jugular foramen. Laryngeal ventricles and true cords appear preserved. Normal fat signal  preserved in the cricoid and thyroid cartilages. Enteric and endotracheal tubes are displaced rightward. Trace retropharyngeal effusion. THYROID GLAND:   Within normal limits. PAROTID AND SUBMANDIBULAR GLANDS: Both submandibular and the right parotid glands are within normal limits. Deep left parotid involvement mentioned above. LYMPH NODES: Similar small jugular chain nodes more numerous on the left than the right, but none considered pathologically enlarged. 0.7 x 0.5 cm suspicious right retropharyngeal node (4/27). Left retropharyngeal space is obliterated by mass, no separate adenopathy. VASCULAR STRUCTURES: Left carotid a jugular involvement described above. Right carotid artery and jugular veins are within normal limits. THORACIC INLET: Similar to CT. Dependent lung opacities. CERVICAL SPINE: Normal appearance. OTHER: Left greater than right mastoid effusions with patchy left enhancement and restricted diffusion were described in today's brain MR report. Asymmetric opacity involves the left petrous apex. Normal appearance of the IACs and inner ear structures. No cerebellopontine angle mass. Impression: Known, large left neck mass described in detail above. Workstation performed: [id number]     MRI brain w wo contrast    Result Date: 9/21/2023  Narrative: MRI BRAIN WITHOUT AND WITH CONTRAST INDICATION:  Oropharyngeal mass s/p biopsy (+) for carcinoma . COMPARISON: 6/17/2017 CT of the brain TECHNIQUE:  Multiplanar/multisequence MRI of the brain was performed administration of contrast. IV Contrast:  7.5 mL of Gadobutrol injection (SINGLE-DOSE) IMAGE QUALITY:  Diagnostic. FINDINGS: BRAIN PARENCHYMA: No  hemorrhage, extra-axial fluid collection, acute infarct, mass effect or midline shift.  Mild, focal patchy periventricular and subcortical white matter foci of abnormal T2 and FLAIR hyperintensity are nonspecific, but usually secondary to changes of microangiopathy. Small developmental venous angioma in the left posterior frontal lobe, typically clinically insignificant. No suspicious enhancement or masses. VENTRICLES:  Within normal limits for age. SELLA AND PITUITARY GLAND:  Within normal limits. ORBITS:  Within normal limits. PARANASAL SINUSES: Mucosal thickening. VASCULATURE: Preserved skull base flow voids. Normal enhancement. CALVARIUM AND SKULL BASE: Bilateral mastoid effusions are likely secondary mass, related to eustachian tube obstruction from nasopharyngeal mass. Patient is also intubated. Small mucosal retention cyst at the right fossa of Rosenmuller. Small ill-defined foci of enhancement in the superior mastoid and asymmetric left mastoid restricted diffusion. No coalescence or discrete mass. Skull and visualized cervical spine are within normal limits. EXTRACRANIAL SOFT TISSUES:  A nasopharyngeal mass will be described in further detail on today's neck MR report. Impression: No evidence of brain metastases. 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 Asymmetric patchy enhancement and restricted diffusion in the left mastoid. The differential includes neoplastic involvement and infection. Workstation performed: [id number]     XR chest portable    Result Date: 9/20/2023  Narrative: CHEST INDICATION:   NG tube placement. COMPARISON: 9/17/2023 EXAM PERFORMED/VIEWS:  XR CHEST PORTABLE FINDINGS: Tracheostomy tube is in stable position. Distal portions of nasogastric tube are seen below the hemidiaphragm within the left upper abdomen. The tip of the tube extends beyond the inferior margin of this study. Heart shadow is enlarged but unchanged from prior exam. There is mild pulmonary vascular congestion. The left costophrenic angle is obscured. Hazy opacities are additionally noted within the right costophrenic angle. No evidence of acute osseous abnormality. Impression: 1. Nasogastric tube is seen with its distal portions within the stomach. The tip of the tube courses beyond the inferior margin of the study. 2. Pulmonary vascular congestion with obscuration of the left hemidiaphragm. This is stable from prior radiograph and may represent small left effusion versus consolidation. 3. Right basilar atelectasis versus trace right effusion. Workstation performed: [id number]     XR chest portable    Result Date: 9/18/2023  Narrative: CHEST INDICATION:   Assess. COMPARISON:  None EXAM PERFORMED/VIEWS:  XR CHEST PORTABLE Images: 2 FINDINGS: Tracheostomy tube is seen in appropriate position. A feeding tube is seen extending down below the dome of the diaphragm Cardiomegaly seen Increased lung markings seen suggest congestion left base is obscured Osseous structures appear within normal limits for patient age. Impression: Increased lung markings seen suggest congestion. The left base is obscured No worsening Workstation performed: [id number]     CT soft tissue neck w contrast    Result Date: 9/14/2023  Narrative: CT NECK WITH CONTRAST INDICATION:   Laryngeal edema COMPARISON:  None. TECHNIQUE:  Axial, sagittal, and coronal 2D reformatted images were created from the axial source data and submitted for interpretation. Radiation dose length product (DLP) for this visit:  769 mGy-cm . This examination, like all CT scans performed in the [facility], was performed utilizing techniques to minimize radiation dose exposure, including the use of iterative reconstruction and automated exposure control. IV Contrast:  85 mL of iohexol (OMNIPAQUE) IMAGE QUALITY:  Diagnostic. FINDINGS: VISUALIZED BRAIN PARENCHYMA:  No acute intracranial pathology of the visualized brain parenchyma. VISUALIZED ORBITS: Normal visualized orbits. PARANASAL SINUSES: Normal visualized paranasal sinuses.  Nasopharynx, oropharynx AND HYPOPHARYNX: There is a large heterogeneously enhancing mass identified within the neck involving multiple spaces. The origin is difficult to ascertain given the large size. This mass measures approximately 6.1 x 4.7 x 5.2 cm and appears to be centered within the left oropharynx. The mass extends superiorly into the nasopharynx left more so than right and into the posterior aspect of the nasal cavity. The mass also extends across the midline within the oropharynx and inferiorly into the left lateral oropharynx but not below the laryngeal ventricle. The mass extends laterally into the infratemporal fossa and parapharyngeal fat and is inseparable from infratemporal musculature and deep lobe of the parotid gland. There is extension into the prevertebral space where the mass is inseparable from the anterior paraspinal muscle on the left and posteriorly and laterally into the carotid space where the mass is displacing and inseparable from jugular and carotid. The mass encases the cervical internal carotid artery just below the level of the skull base resulting in narrowing of the vessel and the mass compresses and occludes the jugular vein below the skull base. The vessel does reconstitute via collateral vessels as it  extends inferiorly within the neck. The mass extends superiorly into the skull base inseparable from the carotid canal and jugular foramen. There is severe airway compromise within the posterior oral cavity and superior oropharynx. ET tube and OG tube are present. THYROID GLAND:  Unremarkable. PAROTID AND SUBMANDIBULAR GLANDS: Normal parotid and submandibular glands other than the mass abutting the deep lobe of the left parotid gland. LYMPH NODES: Multiple small jugular chain nodes are seen on the left. VASCULAR STRUCTURES: As described above the mass partially encases the cervical internal carotid artery, narrowing the vessel and occludes the jugular vein from the skull base to the mid neck.  Below this the vessel is unremarkable due to collateral reconstitution. THORACIC INLET: Posterior left upper lobe consolidation may represent atelectasis or pneumonia. Mild patchy groundglass opacity within the upper lung fields. BONY STRUCTURES: At T1-2 there is a left paramedian bridging osteophyte resulting in mild to moderate left anterior lateral canal stenosis. Impression: Large enhancing soft tissue mass identified within the left neck involving multiple spaces. This appears to be centered within the left oropharynx with extensions as described above into multiple spaces. This results in significant airway compromise. 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 Small level 3 and level 4 nodes are seen within the neck. I personally discussed this study with ICU resident on 9/14/2023 4:44 PM. Workstation performed: [id number]     Bronchoscopy    Result Date: 9/14/2023  Narrative: Table formatting from the original result was not included. [address] [place] [id number] [phone] DATE OF SERVICE: 9/14/23 PHYSICIAN(S): Attending: No Staff Documented Fellow: No Staff Documented INDICATION: Acute respiratory failure with hypoxia ([address]) POST-OP DIAGNOSIS: See the impression below. PREPROCEDURE: Standard airway preparation completed per respiratory therapy protocol. Informed consent was obtained. Images reviewed prior to the procedure. A Time Out was performed. No suspicion or identified risk for TB or other airborne infectious disease; bronchoscopy procedure being performed for diagnostic purposes. PROCEDURE: Bronchoscopy DETAILS OF PROCEDURE: Patient was taken to the procedure room where a time out was performed to confirm correct patient and correct procedure. The patient was already under sedation prior to the procedure. The patient's blood pressure, ECG, heart rate, level of consciousness, respirations and oxygen were monitored throughout the procedure. The patient experienced no blood loss.  The scope was introduced through the endotracheal tube. The procedure was moderately difficult due to patient intolerance and persistent coughing. In response to procedure difficulty, deeper sedation was employed. The patient tolerated the procedure well. There were no apparent adverse events. ANESTHESIA INFORMATION: ASA: ASA status not filed in the log. Anesthesia Type: Anesthesia type not filed in the log. FINDINGS: The lower trachea, main sujata, left main stem, KARTHIK, lingula, LLL, right main stem, RUL, bronchus intermedius, RML and RLL appeared normal. Left lower lung zone with no endobronchial lesions, left upper and lingula both appear normal Right upper, right middle and right lower lobes all appeared normal with no endobronchial lesions Endotracheal tube was retracted 3 cm under direct visualization with the bronchoscope Bronchoalveolar lavage was performed x1 in the right lateral segment (RB4) with 60 mL of saline instilled and a total return of 40 mL; the fluid appeared cloudy SPECIMENS: ID Type Source Tests Collected by Time Destination 1 :  Lavage Lung, Right Middle Lobe Bronchoalveolar Lavage PULMONARY CYTOLOGY Rafat Duran MD 9/14/2023 12:55 PM  A :  Bronchial Lung, Right Middle Lobe Bronchoalveolar Lavage FUNGAL CULTURE, VIRUS CULTURE, BRONCHIAL CULTURE AND GRAM STAIN, LEGIONELLA CULTURE, PNEUMOCYSTIS SMEAR BY DFA ([facility]), AFB CULTURE WITH STAIN Rafat Duran MD 9/14/2023 12:57 PM       Impression: BAL of the right middle lobe Endotracheal tube was retracted under direct visualization Tongue still appears swollen, vocal cords visualized outside of the endotracheal tube with the bronchoscope, no significant edema or mass noted RECOMMENDATION:  Follow-up infectious work-up      XR chest 1 view portable    Result Date: 9/13/2023  Narrative: CHEST INDICATION:   post intubation. COMPARISON: Same day chest radiograph and subsequent same day CT chest. Chest x-ray 8/27/2023.  EXAM PERFORMED/VIEWS:  XR CHEST PORTABLE FINDINGS: Endotracheal tube terminates approximately 4 cm above the sujata. Heart shadow is enlarged but unchanged from prior exam. Bibasilar airspace opacities are again demonstrated. No appreciable pneumothorax. No evidence of acute osseous abnormality. Lucent lesion within T12 is better demonstrated on same-day CT. Impression: 1. Endotracheal tube terminates 4 cm above the sujata. 2. Redemonstration of bibasilar airspace opacities. Workstation performed: [id number]     XR chest 1 view portable    Result Date: 9/13/2023  Narrative: CHEST INDICATION:   post intubation, adjusting ET Tube. COMPARISON:  None EXAM PERFORMED/VIEWS:  XR CHEST PORTABLE FINDINGS: Endotracheal tube terminates approximately 3 cm above the sujata. Cardiomediastinal silhouette appears unremarkable. Bibasilar airspace opacities are again noted. No appreciable pneumothorax or pleural effusion. No evidence of acute osseous abnormality. Lucent lesion within T12 is better demonstrated on same-day CT. Impression: 1. Endotracheal tube terminates 3.3 cm above the sujata. 2. Bibasilar airspace opacities are again demonstrated. Workstation performed: [id number]     XR chest portable    Result Date: 9/13/2023  Narrative: CHEST INDICATION:   sob. COMPARISON: 8/27/2023. Subsequent CT chest performed the same day. EXAM PERFORMED/VIEWS:  XR CHEST PORTABLE FINDINGS: Heart shadow is enlarged but unchanged from prior exam. Hazy opacities are noted within the right lung base, possibly atelectasis versus pneumonia. Left basilar opacity is similar to prior radiograph. No appreciable pneumothorax. No evidence of acute osseous abnormality. Cystic lesion within the T12 vertebral body is better characterized on same-day CT. Impression: 1. Hazy bibasilar airspace opacities which may represent atelectasis versus pneumonia.  Left basilar opacity is similar to recent radiograph while right basilar opacity is new Workstation performed: [id number]     CTA ED chest PE Study    Result Date: 9/13/2023  Narrative: CTA - CHEST WITH IV CONTRAST - PULMONARY ANGIOGRAM INDICATION:   R/O PE. Reports shortness of breath since being discharged from the hospital 3 weeks ago for pneumonia. Fatigue. Productive cough with white sputum. Angioedema. COMPARISON: Chest radiograph 9/13/2023, chest CT 8/25/2023, thoracic spine CT 11/13/2013. TECHNIQUE: CT angiogram timed for optimal opacification of the pulmonary arteries. Axial, sagittal, and coronal 2D reformats created from source data. Coronal 3D MIP postprocessing on the acquisition scanner. Radiation dose length product (DLP):  472 mGy-cm . Radiation dose exposure minimized using iterative reconstruction and automated exposure control. IV Contrast:  85 mL of iohexol (OMNIPAQUE) FINDINGS: PULMONARY ARTERIES:  No pulmonary embolus. Moderate pulmonary artery enlargement. LUNGS: Mild patchy new consolidation in the medial segment right middle lobe. Improving opacity in the left upper lobe with mild residual atelectasis/scar. Redemonstration of mild dependent atelectasis in both lower lobes. Severe emphysema. AIRWAYS: No significant filling defects. ET tube 4 cm above the sujata. PLEURA:  Unremarkable. HEART/GREAT VESSELS: Moderate cardiomegaly. MEDIASTINUM AND PRASANTH: Slight regression of hilar and mediastinal lymphadenopathy. The largest node was previously 2.5 x 2.0 cm in the right infrahilar region and is now 1.9 x 1.3 cm (501/94). CHEST WALL AND LOWER NECK: Unremarkable. UPPER ABDOMEN: Right renal cysts. OSSEOUS STRUCTURES: Redemonstration of the large cystic lesion with sclerotic margins in T12 with destruction of the superior and inferior endplates, present as a much smaller thin-walled cystic lesion on the thoracic spine CT from 2013, imaged on a thoracic spine MRI from 2020. Impression: No pulmonary embolus. Patchy consolidation right middle lobe, new from 8/25/2023, compatible with pneumonia.  Slight regression of previous hilar and mediastinal lymphadenopathy. Improving left upper lobe opacity which may have been due to pneumonia with residual atelectasis/scar. Persistent partial atelectasis of the lower lobes. 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 Workstation performed: [id number]     Echo complete w/ contrast if indicated    Result Date: 8/28/2023  Narrative: •  Left Ventricle: Left ventricular cavity size is small. Wall thickness is increased. There is severe concentric hypertrophy. The left ventricular ejection fraction is 60%. Systolic function is normal. Wall motion is normal. Diastolic function is mildly abnormal, consistent with grade I (abnormal) relaxation. There is no LV dynamic obstruction at rest. •  Right Ventricle: Right ventricular cavity size is normal. Systolic function is normal. •  Left Atrium: The atrium is mildly dilated. •  Mitral Valve: There is mild to moderate regurgitation. There is systolic anterior motion of the chordal apparatus with late peaking gradient 29 mmHg during Valsalva maneuver. •  Pericardium: There is a trivial pericardial effusion along the right atrial free wall. XR chest portable ICU    Result Date: 8/28/2023  Narrative: CHEST INDICATION:   Acute hypoxic respiratory failure. COMPARISON: 8/25/2023 EXAM PERFORMED/VIEWS:  XR CHEST PORTABLE ICU FINDINGS: Heart shadow is enlarged but unchanged from prior exam. There is stable left-sided volume loss secondary to left basilar atelectasis. No pneumothorax or pleural effusion. Osseous structures appear within normal limits for patient age. Impression: Stable volume loss on the left secondary to left lower lobe atelectasis.  Workstation performed: [id number]     VAS lower limb venous duplex study, complete bilateral    Result Date: 8/27/2023  Narrative:  [facility] REPORT CLINICAL: Indications: Patient presents with bilateral lower extremity pain x 1 days. Operative History: cardiac surgery-date unknown. Risk Factors The patient has history of HTN and CKD. She has no history of Hyperlipidemia. CONCLUSION:  Impression: RIGHT LOWER LIMB: No evidence of acute or chronic deep vein thrombosis. No evidence of superficial thrombophlebitis noted. Doppler evaluation shows a normal response to augmentation maneuvers. . Popliteal, posterior tibial and anterior tibial arterial Doppler waveform's are triphasic. LEFT LOWER LIMB: No evidence of acute or chronic deep vein thrombosis. No evidence of superficial thrombophlebitis noted. Doppler evaluation shows a normal response to augmentation maneuvers. Popliteal, posterior tibial and anterior tibial arterial Doppler waveform's are triphasic. SIGNATURE: Electronically Signed by: Sherl Libman on 2023-08-27 08:12:52 PM    CTA ED chest PE Study    Result Date: 8/25/2023  Narrative: CTA - CHEST WITH IV CONTRAST - PULMONARY ANGIOGRAM INDICATION:   Increased SOB, recent COVID diagnosis. COMPARISON: MRI from 3/18/2020 as well as bone scan from 7/2/2019 and thoracic spine from 11/13/2013 TECHNIQUE: CTA examination of the chest was performed using angiographic technique according to a protocol specifically tailored to evaluate for pulmonary embolism. Multiplanar 2D reformatted images were created from the source data. In addition, coronal 3D MIP postprocessing was performed on the acquisition scanner. The study is limited by some respiratory motion artifacts especially in the lower lobes on the left where there is crowding of vessels due to atelectatic changes. Radiation dose length product (DLP) for this visit:  370 mGy-cm . This examination, like all CT scans performed in the [facility], was performed utilizing techniques to minimize radiation dose exposure, including the use of iterative reconstruction and automated exposure control.  IV Contrast:  80 mL of iohexol (OMNIPAQUE) FINDINGS: PULMONARY ARTERIAL TREE: Limited visualization left lower lobe however there is suspicion for a small embolus in the posterobasal segment on axial image 139, series 305 and coronal image 142. No definite filling defect is seen elsewhere. The main pulmonary artery is significantly dilated at 4.2 cm. The RV LV ratio is elevated at 1.1 cm. The primary pulmonary vascular stent minutes are also dilated chronicity is uncertain. LUNGS: Emphysema is noted with blebs at the apices. There is peripheral consolidation in the left upper lobe. Air bronchogram is not well seen and there is mucous occlusion of the left mainstem bronchus with volume loss of the left upper lobe as well as  left lower lobe to a lesser extent. Some aeration in the left lower lobe is still visible. PLEURA:  Unremarkable. HEART/GREAT VESSELS:  Unremarkable for patient's age. No thoracic aortic aneurysm. MEDIASTINUM AND PRASANTH: 10 mm pretracheal node is identified. 9 mm superior mediastinal node is identified. 10 mm prevascular node is identified. Subcarinal node measures 1.5 cm. Hilar adenopathy is also demonstrated. Right hilar node is larger measuring 1.8 cm in short axis on image 138. CHEST WALL AND LOWER NECK:   Unremarkable. VISUALIZED STRUCTURES IN THE UPPER ABDOMEN: Low-density cyst is seen in the right kidney. . OSSEOUS STRUCTURES: There appears to be a destructive lesion involving the T12 vertebral body eroding both endplates and much of the vertebral body this does not appear to represent a Schmorl's node. A cystic lesion was noted in 2013 at this location however the current lesion is larger with loss of endplates. MRI also imaged this lesion in 2020 the lesion appears somewhat larger on the current examination however. Impression: Limited study. There is equivocal embolus in the posterior basal segment left lower lobe. Other smaller segments are difficult to assess due to motion and vascular crowding.  There is mucous plugging in the left mainstem bronchus with volume loss in portions of the left lower lobe and left upper lobe. Aspiration pneumonia is not excluded. There is dilatation of the main pulmonary artery and proximal pulmonary segment suggesting pulmonary hypertension this is more likely chronic than acute given the degree of distention. Emphysema is present. There is significant mediastinal and hilar adenopathy suggesting underlying neoplasm more likely than simple reactive nodes. Enlarging lesion at T12 is again demonstrated of uncertain etiology. This has been present since 2013. Perhaps a plasmacytoma is a consideration. Neoplastic work-up is advised. Pulmonology consultation is also advised to assess endobronchial obstruction on the left. This was discussed with resident physician Dr. Marge Ornelas at 4:58 p.m. Follow-up was marked in the epic system Workstation performed: [id number]     XR chest 1 view portable    Result Date: 8/25/2023  Narrative: CHEST INDICATION:   SOB. COMPARISON: 8/21/2021 EXAM PERFORMED/VIEWS:  XR CHEST PORTABLE Single view FINDINGS: Development of CHF. Probable left basal infiltrate. Cardiomediastinal silhouette has become more enlarged. No pneumothorax or pleural effusion. Osseous structures appear within normal limits for patient age. Impression: Increased cardiomegaly. Development of CHF. Probable left basal infiltrate Workstation performed: [id number]       I have personally reviewed labs, imaging studies, and pertinent reports. This note has been generated by voice recognition software system. Therefore, there may be spelling, grammar, and or syntax errors. Please contact if questions arise.      Mikki Bowers, DO   Hematology and Medical Oncology - PGY [place]

## 2023-09-25 NOTE — PROGRESS NOTES
[id number] [facility]  Progress Note  Name: Altaf Mercado  MRN: [id number]  Unit/Bed#: ICU 03 I Date of Admission: 9/13/2023   Date of Service: 9/25/2023 I Hospital Day: 12    Assessment/Plan   Large cell lymphoma [facility])  Assessment & Plan  · Biopsy on 9/17: Large B-cell lymphoma, germinal center B-cell type, c-MYC and BCL-2 double expression. Ki-67 proliferation index is up to 90%; FISH & NGS pending. · Staging work-up: Currently stage II. · First inpatient chemotherapy 9/22/2023- with R-EPOCH. Plan:  · Inpatient hematology oncology following. Recommendation appreciated. · Outpatient follow-up with hematology oncology. · Close TLS workup  · PEG tube per ENT    Oropharyngeal mass  Assessment & Plan  · 9/14 Neck/ soft tissue CT: Large enhancing soft tissue mass identified within the left neck involving multiple spaces. This appears to be centered within the left oropharynx with extensions as described above into multiple spaces. This results in significant airway compromise. 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 Small level 3 and level 4 nodes are seen within the neck. · Tracheostomy by ENT, bx & addition testing performed  · Bx: Positive for malignancy, favor poorly differentiated carcinoma. Cannot entirely exclude a high grade large cell lymphoma. Portion of specimen submitted for flow cytometry. · H/o tobacco abuse, daily smoker. · Daughter was updated at bedside. · Preliminary biopsy: Large B-cell lymphoma, germinal center B-cell type, c-MYC and BCL-2 double expression. · First chemotherapy on 9/22. Plan:  · ENT/ Med onc following, appreciated  · Follow final biopsy report  · As needed vent per Trach. Weaning off  · IV lasix for pulm congestion/ edema. · Goal off vent x 24 hrs for trach change    * Acute respiratory failure with hypoxia secondary to oropharyngeal mass  Assessment & Plan  • POA with O2 saturate around 80% on room air.   Needed high flow 60 L to bring up her O2 saturation to 95%. • Recent hospitalization for respiratory failure second to pneumonia. • CTA PE study negative for PE. New onset RML PNA. • Persistent partial atelectasis of the lower lobes noted. • Current daily smoker.       Plan:  • Tracheostomy 9/17. • Completed Decadron. • Atrovent/Xopenex  • Airway clearance protocol. IS.  • Added 3% saline nebs     (HFpEF) heart failure with preserved ejection fraction [facility])  Assessment & Plan  Wt Readings from Last 3 Encounters:   09/25/23 78.9 kg (173 lb 15.1 oz)   09/07/23 74.6 kg (164 lb 6.4 oz)   08/30/23 73.3 kg (161 lb 9.6 oz)     Lab Results   Component Value Date    LVEF 60 08/28/2023     (H) 09/13/2023     (H) 08/25/2023     • Volume status: Euvolemic. • POA physical (limited): JVD-, HJR-, B/L LE trace to 1+ edema. • Echo 8/28/23: LVEF 60%. D1DD. • CXR 9/22: Persistent pulmonary venous congestion with small effusions and bibasilar atelectasis. IV lasix given with even I/O.       Plan:  • CMP, magnesium; Goal Mg > 2 and K > 4; Replete prn  • HOB > 30°, Daily standing weights, Measure I/O  • Has been diuresised still not a clear negative balance     Angioedema  Assessment & Plan  • POA with acute respiratory failure, SOB. • On physical in [place] ED: Swollen tongue, swelling soft and hard palate and almost the head of all phalanx is completely closed. Severe angioedema. • Decadron 10 mg, Benadryl 25 mg given. • Initially refused but eventually agreed with intubation. • Prior episode of angioedema in 2020 noted. Suspected coadministration of increase the dose of tramadol and lisinopril. • Per daughter, there is no recent change in medications except Trelegy inhaler, cefdinir. 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 Plan:  • Trach placed 9/17    Ambulatory dysfunction  Assessment & Plan  PT/ OT eval before discharge.     Pain syndrome, chronic  Assessment & Plan  · Home regimen: Oxycodone 5 mg twice daily as needed. Tylenol 650 mg every 8 hours as needed. Gabapentin 600 mg 3 times daily. Medical marijuana. · Per daughter, patient was overusing Tylenol over-the-counter. · Pain mostly from lumbar radiculopathy. · Impaired ambulatory dysfunction second to pain. Plan:  · Restarted gabapentin 300 3 times daily, scheduled oxycodone 5 mg 3 times daily, as needed Tylenol 650 mg every 6 hours. As needed fentanyl 50 mcg every 2 hours for severe pain. Hyperlipidemia  Assessment & Plan  Lab Results   Component Value Date    CHOLESTEROL 203 (H) 01/24/2023    CHOLESTEROL 177 08/11/2022    CHOLESTEROL 184 07/08/2020     Lab Results   Component Value Date    HDL 45 (L) 01/24/2023    HDL 37 (L) 08/11/2022    HDL 44 (L) 07/08/2020     Lab Results   Component Value Date    TRIG 166 (H) 09/16/2023    TRIG 160 (H) 01/24/2023    TRIG 108 08/11/2022     Lab Results   Component Value Date    NONHDLC 146 07/12/2018    [address] (H) 04/29/2013     Continue outpatient diet/ lifestyle modification. Benign essential hypertension  Assessment & Plan  · Well controlled at home. · Home meds: Amlodipine 10 mg daily, Coreg 25 mg twice daily, lisinopril 10 mg daily. · Elevated BP may second to anxiety from chemotherapy/new diagnosis lymphoma, chronic pain. Plan:  · Discontinue Coreg 2/2 interactions with chemo regimen. · Amlodipine 10 mg daily. Lisinopril 20 mg daily. · PRN hydralazine if BP >160. · Consider increasing PO regimen given persistent hypertension     Chronic Superficial thrombophlebitis  Assessment & Plan  9/23: Patient C/o right forearm soreness. Bilateral upper extremity ultrasound was performed in setting of high risk of DVT. RIGHT UPPER LIMB U/S: No evidence of acute or chronic deep vein thrombosis. 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 Doppler evaluation shows a normal response to augmentation maneuvers. 9/24: Reported improving. Will monitor.     Chemotherapy induced neutropenia (HCC)  Assessment & Plan  · Monitor CBC   · Monitor for need to transfuse   · Hemoglobin < 7   · Platlets < 10,000     COPD without exacerbation (HCC)  Assessment & Plan  Continue vent with nebs. Wean off vent. Blister (nonthermal) of left forearm, sequela  Assessment & Plan  · 9/17/23: Blisters of LUE noted, no signs of infection  · Daily wound care, monitor signs of infection     CKD (chronic kidney disease) stage 3, GFR 30-59 ml/min [facility])  Assessment & Plan  Lab Results   Component Value Date    EGFR 85 09/25/2023    EGFR 80 09/24/2023    EGFR 87 09/23/2023    CREATININE 0.72 09/25/2023    CREATININE 0.76 09/24/2023    CREATININE 0.71 09/23/2023     Stable. ICU Core Measures     Vented Patient  VAP Bundle  VAP bundle ordered     A: Assess, Prevent, and Manage Pain · Has pain been assessed? Yes  · Need for changes to pain regimen? No   B: Both Spontaneous Awakening Trials (SATs) and Spontaneous Breathing Trials (SBTs) · Plan to perform spontaneous awakening trial today? N/A   · Plan to perform spontaneous breathing trial today? N/A   · Obvious barriers to extubation? NA   C: Choice of Sedation · RASS Goal: 0 Alert and Calm  · Need for changes to sedation or analgesia regimen? No   D: Delirium · CAM-ICU: Negative   E: Early Mobility  · Plan for early mobility? Yes   F: Family Engagement · Plan for family engagement today? Yes       Antibiotic Review: Patient on appropriate coverage based on culture data. Review of Invasive Devices:      Central access plan: Medications requiring central line      Prophylaxis:  VTE VTE covered by:  enoxaparin, Subcutaneous, 40 mg at 09/24/23 0848       Stress Ulcer  covered byfamotidine (PEPCID) tablet 20 mg [[id number]]       Subjective      HPI/24hr events:  69 yr old female on hospital day 12. She has a PMH of HF with preserved EF, Large cell lymphoma, HLD, chronic pain syndrome, CKD stage III (baseline 0.7-0.8), and HTN.  Initial presented to the ED with SOB that thought to be secondary to angioedema. Patient was intubated in the ER and CT imaging revealed large oralphargnal mass. She is s/p biopsy of mass and tracheostomy on 9/17. Biopsy revealed Oropharyngeal Large B Cell Lymphoma with local invasion and extension into Nasopharynx - Stage II, double Hit MYC & BCL-2. R-EPOCH was initiated on 9/22/2023. She has tolerated the regimen thus far. She has completed 3 doses. 24 hour events:   She failed speech and swallow eval. Recommendation for PEG tube. Patient is currently tolerating TF via NG. She has thick tracheal secretions that do occasionally cause mucous plugging which she derecruits with. One desaturation event overnight secondary to desaturation. The patient has intermittent episodes of hypertension. Required on PRN dose of hydrazine overnight. Review of Systems   All other systems reviewed and are negative. Objective                            Vitals I/O      Most Recent Min/Max in 24hrs   Temp 98.4 °F (36.9 °C) Temp  Min: 98.4 °F (36.9 °C)  Max: 99.2 °F (37.3 °C)   Pulse 99 Pulse  Min: 81  Max: 99   Resp 17 Resp  Min: 11  Max: 28   /74 BP  Min: 149/105  Max: 215/99   O2 Sat 95 % SpO2  Min: 88 %  Max: 98 %      Intake/Output Summary (Last 24 hours) at 9/25/2023 0716  Last data filed at 9/25/2023 0600  Gross per 24 hour   Intake 2552 ml   Output 2075 ml   Net 477 ml         Diet Enteral/Parenteral; Tube Feeding No Oral Diet; Vital AF 1.2; Continuous; 40; 100; Water; Every 6 hours     Invasive Monitoring Physical exam   None Physical Exam  Eyes:      Pupils: Pupils are equal, round, and reactive to light. Skin:     General: Skin is warm and dry. HENT:      Mouth/Throat:      Mouth: Mucous membranes are moist.   Neck:     Trachea: Tracheostomy present. Cardiovascular:      Rate and Rhythm: Regular rhythm. Tachycardia present. Pulses: Normal pulses. Heart sounds: Normal heart sounds.    Abdominal: General: Bowel sounds are normal.      Palpations: Abdomen is soft. Tenderness: There is no abdominal tenderness. Constitutional:       General: She is not in acute distress. Appearance: She is well-developed. Pulmonary:      Effort: Pulmonary effort is normal.      Breath sounds: Rhonchi present. Comments: On HiFlo via trach   Neurological:      General: No focal deficit present. Mental Status: She is alert and oriented to person, place and time. Motor: Strength full and intact in all extremities.             Diagnostic Studies      EKG: ST bmp: 99  Imaging:  I have personally reviewed pertinent films in PACS     Medications:  Scheduled PRN   acyclovir, 400 mg, BID  allopurinol, 300 mg, Daily  amLODIPine, 10 mg, Daily  busPIRone, 30 mg, TID  chlorhexidine, 15 mL, Q12H [facility]  [START ON 9/26/2023] cyclophosphamide (CYTOXAN) 1,350 mg in sodium chloride 0.9 % 250 mL IVPB, 750 mg/m2 (Treatment Plan Recorded), Once  DOXOrubicin (ADRIAMYCIN) 18 mg, etoposide (TOPOSAR) 90 mg, vinCRIStine (ONCOVIN) 0.7 mg in sodium chloride 0.9 % 500 mL infusion, , Q24H  enoxaparin, 40 mg, Q24H ASHLEY  famotidine, 20 mg, BID  fluconazole, 400 mg, Daily  gabapentin, 300 mg, TID  guaiFENesin, 200 mg, Q6H  levalbuterol, 1.25 mg, Q6H   And  ipratropium, 0.5 mg, Q6H  levofloxacin, 500 mg, Q24H ASHLEY  lisinopril, 20 mg, Daily  nicotine, 21 mg, Daily  nystatin, 500,000 Units, 4x Daily  ondansetron (ZOFRAN) 16 mg, dexamethasone (DECADRON) 10 mg in sodium chloride 0.9 % 50 mL IVPB, , Q24H  oxyCODONE, 5 mg, Q8H  potassium chloride, 20 mEq, Once  predniSONE, 60 mg/m2 (Treatment Plan Recorded), BID With Meals  senna, 8.8 mg, HS  sertraline, 50 mg, Daily  [START ON 9/26/2023] sodium chloride, 2,000 mL, Once  sodium chloride, 4 mL, Q6H  sulfamethoxazole-trimethoprim, 1 tablet, Once per day on Mon Wed Fri      acetaminophen, 650 mg, Q6H PRN  alteplase, 2 mg, Q1MIN PRN  fentanyl citrate (PF), 50 mcg, Q2H PRN  hydrALAZINE, 10 mg, Q4H PRN  levalbuterol, 1.25 mg, Q8H PRN  ondansetron, 4 mg, Q8H PRN  oxyCODONE, 2.5 mg, Q4H PRN   Or  oxyCODONE, 5 mg, Q4H PRN  polyethylene glycol, 17 g, Daily PRN  sodium chloride, 20 mL/hr, Daily PRN       Continuous          Labs:    CBC    Recent Labs     09/24/23  0500 09/25/23  0539   WBC 13.95* 11.23*   HGB 11.0* 11.2*   HCT 34.2* 34.7*    204   BANDSPCT 2  --      BMP    Recent Labs     09/24/23  0500 09/25/23  0539   SODIUM 140 139   K 3.6 3.4*    106   CO2 29 30   AGAP 7 3   BUN 28* 28*   CREATININE 0.76 0.72   CALCIUM 8.8 8.8       Coags    No recent results     Additional Electrolytes  Recent Labs     09/24/23  0500 09/25/23  0539   MG 2.0 1.9   PHOS 2.1* 2.2*   CAIONIZED 1.19  --           Blood Gas    No recent results  No recent results LFTs  Recent Labs     09/24/23  0500 09/25/23  0539   ALT 17 17   AST 15 13   ALKPHOS 58 51   ALB 2.7* 2.8*   TBILI 0.30 0.30       Infectious  No recent results  Glucose  Recent Labs     09/24/23  0500 09/25/23  0539   GLUC 163* 150*               Critical Care Time Delivered: Upon my evaluation, this patient had a high probability of imminent or life-threatening deterioration due to acute on chronic respiratory failure, , which required my direct attention, intervention, and personal management. I have personally provided 20 minutes of critical care time, exclusive of procedures, teaching, family meetings, and any prior time recorded by providers other than myself.      RANDEE Gresham

## 2023-09-26 PROBLEM — J44.9 COPD WITHOUT EXACERBATION (HCC): Status: RESOLVED | Noted: 2023-09-18 | Resolved: 2023-09-26

## 2023-09-26 LAB
ALBUMIN SERPL BCP-MCNC: 2.9 G/DL (ref 3.5–5)
ALP SERPL-CCNC: 52 U/L (ref 34–104)
ALT SERPL W P-5'-P-CCNC: 19 U/L (ref 7–52)
ANION GAP SERPL CALCULATED.3IONS-SCNC: 7 MMOL/L
AST SERPL W P-5'-P-CCNC: 14 U/L (ref 13–39)
BILIRUB SERPL-MCNC: 0.38 MG/DL (ref 0.2–1)
BUN SERPL-MCNC: 27 MG/DL (ref 5–25)
CALCIUM ALBUM COR SERPL-MCNC: 10.5 MG/DL (ref 8.3–10.1)
CALCIUM SERPL-MCNC: 9.6 MG/DL (ref 8.4–10.2)
CHLORIDE SERPL-SCNC: 99 MMOL/L (ref 96–108)
CO2 SERPL-SCNC: 31 MMOL/L (ref 21–32)
CREAT SERPL-MCNC: 0.68 MG/DL (ref 0.6–1.3)
ERYTHROCYTE [DISTWIDTH] IN BLOOD BY AUTOMATED COUNT: 14.9 % (ref 11.6–15.1)
GFR SERPL CREATININE-BSD FRML MDRD: 89 ML/MIN/1.73SQ M
GLUCOSE SERPL-MCNC: 138 MG/DL (ref 65–140)
HCT VFR BLD AUTO: 36.4 % (ref 34.8–46.1)
HGB BLD-MCNC: 11.7 G/DL (ref 11.5–15.4)
LDH SERPL-CCNC: 255 U/L (ref 140–271)
MAGNESIUM SERPL-MCNC: 2 MG/DL (ref 1.9–2.7)
MCH RBC QN AUTO: 30.5 PG (ref 26.8–34.3)
MCHC RBC AUTO-ENTMCNC: 32.1 G/DL (ref 31.4–37.4)
MCV RBC AUTO: 95 FL (ref 82–98)
MYCOBACTERIUM SPEC CULT: NORMAL
NRBC BLD AUTO-RTO: 0 /100 WBCS
P JIROVECII AG SPEC QL IF: NORMAL
PHOSPHATE SERPL-MCNC: 2.3 MG/DL (ref 2.3–4.1)
PLATELET # BLD AUTO: 219 THOUSANDS/UL (ref 149–390)
PMV BLD AUTO: 9.5 FL (ref 8.9–12.7)
POTASSIUM SERPL-SCNC: 3.7 MMOL/L (ref 3.5–5.3)
PROT SERPL-MCNC: 6.2 G/DL (ref 6.4–8.4)
RBC # BLD AUTO: 3.84 MILLION/UL (ref 3.81–5.12)
RHODAMINE-AURAMINE STN SPEC: NORMAL
SODIUM SERPL-SCNC: 137 MMOL/L (ref 135–147)
URATE SERPL-MCNC: 2.2 MG/DL (ref 2–7.5)
WBC # BLD AUTO: 8.67 THOUSAND/UL (ref 4.31–10.16)

## 2023-09-26 PROCEDURE — 94760 N-INVAS EAR/PLS OXIMETRY 1: CPT

## 2023-09-26 PROCEDURE — 3E04305 INTRODUCTION OF OTHER ANTINEOPLASTIC INTO CENTRAL VEIN, PERCUTANEOUS APPROACH: ICD-10-PCS | Performed by: INTERNAL MEDICINE

## 2023-09-26 PROCEDURE — 97116 GAIT TRAINING THERAPY: CPT

## 2023-09-26 PROCEDURE — 99232 SBSQ HOSP IP/OBS MODERATE 35: CPT | Performed by: STUDENT IN AN ORGANIZED HEALTH CARE EDUCATION/TRAINING PROGRAM

## 2023-09-26 PROCEDURE — 99232 SBSQ HOSP IP/OBS MODERATE 35: CPT | Performed by: INTERNAL MEDICINE

## 2023-09-26 PROCEDURE — 80053 COMPREHEN METABOLIC PANEL: CPT | Performed by: STUDENT IN AN ORGANIZED HEALTH CARE EDUCATION/TRAINING PROGRAM

## 2023-09-26 PROCEDURE — 85027 COMPLETE CBC AUTOMATED: CPT | Performed by: STUDENT IN AN ORGANIZED HEALTH CARE EDUCATION/TRAINING PROGRAM

## 2023-09-26 PROCEDURE — 94640 AIRWAY INHALATION TREATMENT: CPT

## 2023-09-26 PROCEDURE — 97535 SELF CARE MNGMENT TRAINING: CPT

## 2023-09-26 PROCEDURE — 99233 SBSQ HOSP IP/OBS HIGH 50: CPT | Performed by: OTOLARYNGOLOGY

## 2023-09-26 PROCEDURE — 94669 MECHANICAL CHEST WALL OSCILL: CPT

## 2023-09-26 PROCEDURE — 83735 ASSAY OF MAGNESIUM: CPT

## 2023-09-26 PROCEDURE — 84100 ASSAY OF PHOSPHORUS: CPT | Performed by: STUDENT IN AN ORGANIZED HEALTH CARE EDUCATION/TRAINING PROGRAM

## 2023-09-26 PROCEDURE — 83615 LACTATE (LD) (LDH) ENZYME: CPT | Performed by: STUDENT IN AN ORGANIZED HEALTH CARE EDUCATION/TRAINING PROGRAM

## 2023-09-26 PROCEDURE — 94668 MNPJ CHEST WALL SBSQ: CPT

## 2023-09-26 PROCEDURE — 92597 ORAL SPEECH DEVICE EVAL: CPT

## 2023-09-26 PROCEDURE — L8501 TRACHEOSTOMY SPEAKING VALVE: HCPCS

## 2023-09-26 PROCEDURE — NC001 PR NO CHARGE: Performed by: STUDENT IN AN ORGANIZED HEALTH CARE EDUCATION/TRAINING PROGRAM

## 2023-09-26 PROCEDURE — 92610 EVALUATE SWALLOWING FUNCTION: CPT

## 2023-09-26 PROCEDURE — 84550 ASSAY OF BLOOD/URIC ACID: CPT | Performed by: STUDENT IN AN ORGANIZED HEALTH CARE EDUCATION/TRAINING PROGRAM

## 2023-09-26 RX ORDER — ACETAMINOPHEN 325 MG/1
650 TABLET ORAL ONCE
Status: COMPLETED | OUTPATIENT
Start: 2023-09-27 | End: 2023-09-27

## 2023-09-26 RX ORDER — DIPHENHYDRAMINE HYDROCHLORIDE 50 MG/ML
25 INJECTION INTRAMUSCULAR; INTRAVENOUS ONCE
Status: COMPLETED | OUTPATIENT
Start: 2023-09-27 | End: 2023-09-27

## 2023-09-26 RX ORDER — POTASSIUM CHLORIDE 20MEQ/15ML
40 LIQUID (ML) ORAL ONCE
Status: COMPLETED | OUTPATIENT
Start: 2023-09-26 | End: 2023-09-26

## 2023-09-26 RX ORDER — DIPHENHYDRAMINE HYDROCHLORIDE 50 MG/ML
25 INJECTION INTRAMUSCULAR; INTRAVENOUS ONCE
Status: DISCONTINUED | OUTPATIENT
Start: 2023-09-26 | End: 2023-09-26

## 2023-09-26 RX ORDER — ACETAMINOPHEN 325 MG/1
650 TABLET ORAL ONCE
Status: DISCONTINUED | OUTPATIENT
Start: 2023-09-26 | End: 2023-09-26

## 2023-09-26 RX ADMIN — GUAIFENESIN 200 MG: 200 SOLUTION ORAL at 03:01

## 2023-09-26 RX ADMIN — OXYCODONE HYDROCHLORIDE 5 MG: 5 SOLUTION ORAL at 14:28

## 2023-09-26 RX ADMIN — LEVALBUTEROL HYDROCHLORIDE 1.25 MG: 1.25 SOLUTION RESPIRATORY (INHALATION) at 08:35

## 2023-09-26 RX ADMIN — GABAPENTIN 300 MG: 300 CAPSULE ORAL at 17:00

## 2023-09-26 RX ADMIN — IPRATROPIUM BROMIDE 0.5 MG: 0.5 SOLUTION RESPIRATORY (INHALATION) at 13:28

## 2023-09-26 RX ADMIN — ENOXAPARIN SODIUM 40 MG: 40 INJECTION SUBCUTANEOUS at 08:15

## 2023-09-26 RX ADMIN — AMLODIPINE BESYLATE 10 MG: 5 TABLET ORAL at 08:22

## 2023-09-26 RX ADMIN — LISINOPRIL 30 MG: 20 TABLET ORAL at 08:22

## 2023-09-26 RX ADMIN — POTASSIUM CHLORIDE 40 MEQ: 1.5 SOLUTION ORAL at 08:19

## 2023-09-26 RX ADMIN — FAMOTIDINE 20 MG: 20 TABLET, FILM COATED ORAL at 08:22

## 2023-09-26 RX ADMIN — OXYCODONE HYDROCHLORIDE 5 MG: 5 SOLUTION ORAL at 22:58

## 2023-09-26 RX ADMIN — PREDNISONE 100 MG: 50 TABLET ORAL at 08:22

## 2023-09-26 RX ADMIN — NYSTATIN 500000 UNITS: 100000 SUSPENSION ORAL at 12:57

## 2023-09-26 RX ADMIN — GUAIFENESIN 200 MG: 200 SOLUTION ORAL at 08:20

## 2023-09-26 RX ADMIN — GUAIFENESIN 200 MG: 200 SOLUTION ORAL at 22:58

## 2023-09-26 RX ADMIN — GUAIFENESIN 200 MG: 200 SOLUTION ORAL at 14:28

## 2023-09-26 RX ADMIN — METOPROLOL TARTRATE 25 MG: 25 TABLET, FILM COATED ORAL at 08:22

## 2023-09-26 RX ADMIN — METOPROLOL TARTRATE 25 MG: 25 TABLET, FILM COATED ORAL at 22:58

## 2023-09-26 RX ADMIN — Medication 8.8 MG: at 23:01

## 2023-09-26 RX ADMIN — BUSPIRONE HYDROCHLORIDE 30 MG: 10 TABLET ORAL at 17:00

## 2023-09-26 RX ADMIN — SERTRALINE HYDROCHLORIDE 50 MG: 50 TABLET ORAL at 08:23

## 2023-09-26 RX ADMIN — CHLORHEXIDINE GLUCONATE 15 ML: 1.2 SOLUTION ORAL at 08:17

## 2023-09-26 RX ADMIN — SODIUM CHLORIDE SOLN NEBU 3% 4 ML: 3 NEBU SOLN at 02:07

## 2023-09-26 RX ADMIN — SODIUM CHLORIDE SOLN NEBU 3% 4 ML: 3 NEBU SOLN at 19:44

## 2023-09-26 RX ADMIN — SODIUM CHLORIDE SOLN NEBU 3% 4 ML: 3 NEBU SOLN at 08:36

## 2023-09-26 RX ADMIN — ACYCLOVIR 400 MG: 200 CAPSULE ORAL at 17:02

## 2023-09-26 RX ADMIN — LEVOFLOXACIN 500 MG: 250 TABLET, FILM COATED ORAL at 08:23

## 2023-09-26 RX ADMIN — BUSPIRONE HYDROCHLORIDE 30 MG: 10 TABLET ORAL at 08:23

## 2023-09-26 RX ADMIN — IPRATROPIUM BROMIDE 0.5 MG: 0.5 SOLUTION RESPIRATORY (INHALATION) at 02:07

## 2023-09-26 RX ADMIN — OXYCODONE HYDROCHLORIDE 5 MG: 5 SOLUTION ORAL at 08:20

## 2023-09-26 RX ADMIN — IPRATROPIUM BROMIDE 0.5 MG: 0.5 SOLUTION RESPIRATORY (INHALATION) at 08:35

## 2023-09-26 RX ADMIN — LEVALBUTEROL HYDROCHLORIDE 1.25 MG: 1.25 SOLUTION RESPIRATORY (INHALATION) at 13:28

## 2023-09-26 RX ADMIN — CYCLOPHOSPHAMIDE 1350 MG: 1 INJECTION, POWDER, FOR SOLUTION INTRAVENOUS; ORAL at 11:47

## 2023-09-26 RX ADMIN — SODIUM CHLORIDE SOLN NEBU 3% 4 ML: 3 NEBU SOLN at 13:28

## 2023-09-26 RX ADMIN — OXYCODONE HYDROCHLORIDE 5 MG: 5 SOLUTION ORAL at 03:01

## 2023-09-26 RX ADMIN — LEVALBUTEROL HYDROCHLORIDE 1.25 MG: 1.25 SOLUTION RESPIRATORY (INHALATION) at 19:44

## 2023-09-26 RX ADMIN — IPRATROPIUM BROMIDE 0.5 MG: 0.5 SOLUTION RESPIRATORY (INHALATION) at 19:44

## 2023-09-26 RX ADMIN — FLUCONAZOLE 400 MG: 100 TABLET ORAL at 08:21

## 2023-09-26 RX ADMIN — SODIUM CHLORIDE 2000 ML: 0.9 INJECTION, SOLUTION INTRAVENOUS at 09:00

## 2023-09-26 RX ADMIN — NICOTINE 21 MG: 21 PATCH, EXTENDED RELEASE TRANSDERMAL at 08:17

## 2023-09-26 RX ADMIN — NYSTATIN 500000 UNITS: 100000 SUSPENSION ORAL at 17:02

## 2023-09-26 RX ADMIN — BUSPIRONE HYDROCHLORIDE 30 MG: 10 TABLET ORAL at 23:00

## 2023-09-26 RX ADMIN — NYSTATIN 500000 UNITS: 100000 SUSPENSION ORAL at 08:18

## 2023-09-26 RX ADMIN — ACYCLOVIR 400 MG: 200 CAPSULE ORAL at 08:23

## 2023-09-26 RX ADMIN — FAMOTIDINE 20 MG: 20 TABLET, FILM COATED ORAL at 17:02

## 2023-09-26 RX ADMIN — LEVALBUTEROL HYDROCHLORIDE 1.25 MG: 1.25 SOLUTION RESPIRATORY (INHALATION) at 02:07

## 2023-09-26 RX ADMIN — SODIUM CHLORIDE 20 ML/HR: 0.9 INJECTION, SOLUTION INTRAVENOUS at 11:42

## 2023-09-26 RX ADMIN — ALLOPURINOL 300 MG: 100 TABLET ORAL at 08:22

## 2023-09-26 RX ADMIN — GABAPENTIN 300 MG: 300 CAPSULE ORAL at 08:23

## 2023-09-26 RX ADMIN — HYDRALAZINE HYDROCHLORIDE 10 MG: 20 INJECTION, SOLUTION INTRAMUSCULAR; INTRAVENOUS at 14:42

## 2023-09-26 RX ADMIN — HYDRALAZINE HYDROCHLORIDE 10 MG: 20 INJECTION, SOLUTION INTRAMUSCULAR; INTRAVENOUS at 04:32

## 2023-09-26 RX ADMIN — PREDNISONE 100 MG: 50 TABLET ORAL at 17:02

## 2023-09-26 RX ADMIN — ONDANSETRON: 2 INJECTION INTRAMUSCULAR; INTRAVENOUS at 09:35

## 2023-09-26 RX ADMIN — GABAPENTIN 300 MG: 300 CAPSULE ORAL at 22:58

## 2023-09-26 NOTE — OCCUPATIONAL THERAPY NOTE
Occupational Therapy Progress Note     Patient Name: Keven Dwyer  [id number] Date: 9/26/2023  Problem List  Principal Problem:    Acute respiratory failure with hypoxia secondary to oropharyngeal mass  Active Problems:    Benign essential hypertension    Hyperlipidemia    Pain syndrome, chronic    CKD (chronic kidney disease) stage 3, GFR 30-59 ml/min (HCC)    (HFpEF) heart failure with preserved ejection fraction (HCC)    Ambulatory dysfunction    Angioedema    Oropharyngeal mass    Blister (nonthermal) of left forearm, sequela    Large cell lymphoma (HCC)    Chemotherapy induced neutropenia (HCC)    Chronic Superficial thrombophlebitis              09/26/23 1133   OT Last Visit   OT Visit Date 09/26/23   Note Type   Note Type Treatment   Pain Assessment   Pain Assessment Tool FLACC   Pain Rating: FLACC (Rest) - Face 1   Pain Rating: FLACC (Rest) - Legs 0   Pain Rating: FLACC (Rest) - Activity 0   Pain Rating: FLACC (Rest) - Cry 0   Pain Rating: FLACC (Rest) - Consolability 0   Score: FLACC (Rest) 1   Pain Rating: FLACC (Activity) - Face 1   Pain Rating: FLACC (Activity) - Legs 0   Pain Rating: FLACC (Activity) - Activity 0   Pain Rating: FLACC (Activity) - Cry 1   Pain Rating: FLACC (Activity) - Consolability 1   Score: FLACC (Activity) 3   Restrictions/Precautions   Weight Bearing Precautions Per Order No   Other Precautions Chair Alarm; Bed Alarm;Multiple lines;Telemetry; Fall Risk;Pain   Lifestyle   Autonomy PTA pt living with daughter in [facility] with [address] [facility], pt requiring (A) with ADLs and IADLs, (+)falls, (-)drives use of quad cane vs RW at baseline   Reciprocal Relationships supportive daughter however daughter does work during the day   Service to Others retired   Intrinsic Gratification unable to state at this time   ADL   Grooming Assistance 5  Supervision/Setup   Grooming Deficit Increased time to complete;Wash/dry face   LB Dressing Assistance 4  Minimal Assistance   LB Dressing Deficit Increased time to complete;Supervision/safety;Verbal cueing; Thread RLE into underwear; Thread LLE into underwear;Pull up over hips   Transfers   Sit to Stand 5  Supervision   Additional items Increased time required;Verbal cues   Stand to Sit 5  Supervision   Additional items Increased time required;Verbal cues   Additional Comments \   Functional Mobility   Additional Comments limited 1-2 stes forward and back, limited by fatigue   Subjective   Subjective "I'm so tired"   Cognition   Overall Cognitive Status [facility]   Arousal/Participation Alert; Cooperative   Attention Within functional limits   Orientation Level Oriented X4   Memory Within functional limits   Following Commands Follows one step commands without difficulty   Comments pleasant and cooperative, notably depresssed and frustrated with situation   Activity Tolerance   Activity Tolerance Patient limited by fatigue   Medical Staff Made Aware TINO Owen, spoke to PT [facility]   Assessment   Assessment Pt seen for skilled OT treatment pt received sitting in chair s/p PT session. Pt reports fatigue from PT session prior to arrival. Pt agreeable to trial ADL tasks. Demonstrating improved LB dressing tasks. Due to fatigue declining participation in bathing and UB dressing tasks. Demonstrating improved standing tolerance during pulling up of underwear. Declining further participation in OT treatment at this time due to fatigue. Pt educated on importance of participation and edu provided on pacing of tasks.  Would cont to benefit from skilled OT treatment, will cont to follow   Plan   Goal Expiration Date 10/01/23   OT Treatment Day 1   OT Frequency 3-5x/wk   Recommendation   OT Discharge Recommendation Post acute rehabilitation services   AM-PAC Daily Activity Inpatient   Lower Body Dressing 3   Bathing 3   Toileting 2   Upper Body Dressing 3   Grooming 3   Eating 1   Daily Activity Raw Score 15   Daily Activity Standardized Score (Calc for Raw Score >=11) 34.69   AM-PAC Applied Cognition Inpatient   Following a Speech/Presentation 4   Understanding Ordinary Conversation 4   Taking Medications 3   Remembering Where Things Are Placed or Put Away 3   Remembering List of 4-5 Errands 3   Taking Care of Complicated Tasks 3   Applied Cognition Raw Score 20   Applied Cognition Standardized Score 41.76   End of Consult   Patient Position at End of Consult Bedside chair;Bed/Chair alarm activated; All needs within reach     GOALS:   Goals established in order to promote pt's established goal of going home     -Patient will perform grooming tasks standing at sink with overall Mod I in order to increase overall independence PROGRESSING     -Patient will be Mod I with UB dressing using AE and AD as needed in order to increase (I) with ADLs     -Patient will be Mod I with UB bathing using AE and AD as needed in order to increase (I) with ADLs     -Patient will be Mod I with LB dressing with use of AE and AD as needed in order to increase (I) with ADLs PROGRESSING     -Patient will be Mod I with LB bathing with use of AE and AD as needed in order to increase (I) with ADLs     -Patient will complete toileting w/ Mod I w/ G hygiene/thoroughness in order to reduce caregiver burden     -Patient will demonstrate Mod I with bed mobility for ability to manage own comfort and initiate OOB tasks.      -Patient will perform functional transfers with Mod I to/from all surfaces using DME as needed in order to increase (I) with functional tasks PROGRESSING     -Patient will be Mod I with functional mobility to/from bathroom for increased independence with toileting tasks     -Patient will independently integrate one pacing strategy into morning ADL's. PROGRESSING     -Patient will demonstrate standing for 3 min in order to increase active participation in functional activities [address]        The patient's raw score on the -PAC Daily Activity Inpatient Short Form is 15.  A raw score of less than 19 suggests the patient may benefit from discharge to post-acute rehabilitation services. Please refer to the recommendation of the Occupational Therapist for safe discharge planning.     Bri Curiel MS, OTR/L

## 2023-09-26 NOTE — CASE MANAGEMENT
Case Management Discharge Planning Note    Patient name Shantal Rapp  Location ICU 03/ICU 03 MRN [id number]  : 1955 Date 2023       Current Admission Date: 2023  Current Admission Diagnosis:Acute respiratory failure with hypoxia secondary to oropharyngeal mass   Patient Active Problem List    Diagnosis Date Noted   • Chronic Superficial thrombophlebitis 2023   • Large cell lymphoma ([address]) 2023   • Chemotherapy induced neutropenia ([address]) 2023   • Blister (nonthermal) of left forearm, sequela 2023   • 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   • Angioedema 2023   • Vaginal itching 2023   • Ambulatory dysfunction 2023   • Acute respiratory failure with hypoxia secondary to oropharyngeal mass 2023   • (HFpEF) heart failure with preserved ejection fraction ([address]) 2023   • Pulmonary embolism ([address]) 2023   • CKD (chronic kidney disease) stage 3, GFR 30-59 ml/min ([address]) 2021   • Bone lesion 2021   • Nicotine dependence 06/15/2021   • Bipolar disorder, unspecified ([address]) 06/10/2021   • Abnormal computed tomography angiography (CTA) 2020   • Angio-edema, initial encounter 07/10/2020   • Pain syndrome, chronic 2014   • 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   • Disc degeneration, lumbar 2013   • Lumbar radiculopathy 2013   • Myofascial pain syndrome 2013   • Osteoarthritis of spine with radiculopathy, lumbar region 2013   • Benign essential hypertension 2012   • Bipolar I disorder, single manic episode ([address]) 2012   • Hyperlipidemia 2012      LOS (days): 13  Geometric Mean LOS (GMLOS) (days): 24.20  Days to GMLOS:11.2     OBJECTIVE:  Risk of Unplanned Readmission Score: 29.77         Current admission status: Inpatient   Preferred Pharmacy:   [facility]/pharmacy #[id number]- [place]Y GARAY - [address]. [address]   [address] [address]  Phone: [phone] Fax: [phone] [facility] Jennifer Kraus, [place] - [address] [place] [address] [place] [address]  Phone: [phone] Fax: [phone]    Primary Care Provider: Isabella Pollock MD    Primary Insurance: Titadanika Froyrandy [facility] REP  Secondary Insurance: [address]    DISCHARGE DETAILS:    Discharge planning discussed with[de-identified] pt and daughterSandra of Choice: Yes  Comments - [place] of Choice: [place] STR    Contacts  Patient Contacts: Linnette-daughter  Relationship to Patient[de-identified] Family  Contact Method: Phone  Reason/Outcome: Continuity of Care, Emergency Contact, Discharge Planning, Referral    Other Referral/Resources/Interventions Provided:  Interventions: Short Term Rehab  Referral Comments: CM spoke with pt at bedside and re-introduced self/role with dcp. CM discussed STR which pt is agreeable to. Pt aware CM will place referrals and f/u with options available. CM also spoke with pt daughter, Kaylah Erwin. Linnette aware CM will place referrals and f/u with options. Referrals in [place]. Kaylah Duffmendez did confirm she spoke with pt , Porfirio Blackwell, and aware that she can assist with temporary assistance once pt returns home until pt can be evaluated for official hours via waiver.     Treatment Team Recommendation: Short Term Rehab  Discharge Destination Plan[de-identified] Short Term Rehab

## 2023-09-26 NOTE — ASSESSMENT & PLAN NOTE
· Home regimen: Oxycodone 5 mg twice daily as needed. Tylenol 650 mg every 8 hours as needed. Gabapentin 600 mg 3 times daily. Medical marijuana. · Per daughter, patient was overusing Tylenol over-the-counter. · Pain mostly from lumbar radiculopathy. · Impaired ambulatory dysfunction second to pain. Plan:  · Continue gabapentin 300 3 times daily, scheduled oxycodone 5 mg 3 times daily, as needed Tylenol 650 mg every 6 hours. As needed fentanyl 50 mcg every 2 hours for severe pain.

## 2023-09-26 NOTE — ASSESSMENT & PLAN NOTE
· Well controlled at home. · Home meds: Amlodipine 10 mg daily, Coreg 25 mg twice daily, lisinopril 10 mg daily. · Elevated BP may second to anxiety from chemotherapy/new diagnosis lymphoma, chronic pain. Plan:  · Discontinue Coreg 2/2 interactions with chemo regimen. · Amlodipine 10 mg daily. Lisinopril 30 mg daily. · PRN hydralazine if BP >160.   · Consider increasing PO regimen given persistent hypertension

## 2023-09-26 NOTE — PHYSICAL THERAPY NOTE
PHYSICAL THERAPY TREATMENT  DATE: 23  TIME: 0811-4184    NAME:  Raissa Hassan  AGE:   69 y.o.  Mrn:   [id number]  Length Of Stay: 13    ADMIT DX:  Angioedema, initial encounter Leland Carrera. [id number]]    Past Medical History:   Diagnosis Date    Anxiety     Depression     Fatty liver     Hyperlipidemia     Hypertension     Psychiatric disorder     Varicella      Past Surgical History:   Procedure Laterality Date    BREAST SURGERY Bilateral     Reduction Procedure    CARDIAC SURGERY       SECTION      x4    DILATION AND CURETTAGE OF UTERUS      ECTOPIC PREGNANCY SURGERY       [address] INCL FLUOR GDNCE DX W/CELL WASHG 44 [facility] N/A 2023    Procedure: BRONCHOSCOPY FLEXIBLE;  Surgeon: Fatmata Alex MD;  [facility]: [facility];  Service: ENT    TRACHEOSTOMY N/A 2023    Procedure: TRACHEOSTOMY, biopsy of nasopharynx mass;  Surgeon: Fatmata Alex MD;  [facility];  Service: ENT       Performed at least 2 patient identifiers during session: Name, ID bracelet, and Epic photo       23 1107   PT Last Visit   PT Visit Date 23   Pain Assessment   Pain Assessment Tool FLACC   Pain Location/Orientation Location: Neck   Effect of Pain on Daily Activities pain affecting pt comfort; no affect on mobility   Hospital Pain Intervention(s) Repositioned; Ambulation/increased activity; Emotional support   Multiple Pain Sites No   Pain Rating: FLACC (Rest) - Face 1   Pain Rating: FLACC (Rest) - Legs 0   Pain Rating: FLACC (Rest) - Activity 0   Pain Rating: FLACC (Rest) - Cry 0   Pain Rating: FLACC (Rest) - Consolability 0   Score: FLACC (Rest) 1   Pain Rating: FLACC (Activity) - Face 1   Pain Rating: FLACC (Activity) - Legs 0   Pain Rating: FLACC (Activity) - Activity 0   Pain Rating: FLACC (Activity) - Cry 1   Pain Rating: FLACC (Activity) - Consolability 1   Score: FLACC (Activity) 3   Restrictions/Precautions   Weight Bearing Precautions Per Order No   Other Precautions Chair Alarm; Bed Alarm;Multiple lines;Telemetry; Fall Risk;Pain   General   Additional Pertinent History Pt admitted on 9/13/2023 with c/o of SOB. Exam findings show airway obstructive mass, newly dx lymphoma. PMH is significant for HTN, CKD, HFpEF, PE, and anx/dep. Since last PT session, pt has started chemotherapy; no additional significant medical changes since last session   Family/Caregiver Present No   Cognition   Overall Cognitive Status WFL   Attention Within functional limits   Orientation Level Oriented to person;Oriented to situation;Oriented to place   Memory Within functional limits   Following Commands Follows one step commands without difficulty   Comments Pt is notably depressed and frustrated with her current situation. Agreeable to PT session despite frustrations   Subjective   Subjective "I am cold"   Bed Mobility   Additional Comments NT as pt was found and left in OOB recliner chair following session   Transfers   Sit to Stand 5  Supervision   Additional items Assist x 1; Armrests; Increased time required;Verbal cues  (PT providing management of lines)   Stand to Sit 5  Supervision   Additional items Assist x 1; Armrests; Increased time required;Verbal cues  (PT providing management of lines)   Stand pivot 4  Minimal assistance   Additional items Assist x 1; Increased time required;Verbal cues  (MINAx1 to lower to commode)   Toilet transfer 4  Minimal assistance   Additional items Assist x 1;Commode; Increased time required;Verbal cues;Armrests   Additional Comments Improved standing tolerance and decreased levels of A required   Ambulation/Elevation   Gait pattern Forward Flexion;Decreased foot clearance; Short stride; Excessively slow; Step through pattern   Gait Assistance 4  Minimal assist   Additional items Assist x 1;Verbal cues; Tactile cues  (MINAx1 progressing to S for amb trials)   Assistive Device Rolling walker   Distance 8 ft + 10 ft   Ambulation/Elevation Additional Comments 2 amb trials completed with chair follow to for pt safety d/t increased levels of fatigue   Balance   Static Sitting Fair +   Dynamic Sitting Fair   Static Standing Fair -  (w RW)   Dynamic Standing Fair -  (w RW)   Ambulatory Poor +  (w RW)   Endurance Deficit   Endurance Deficit Yes   Endurance Deficit Description Pt reports she is "wiped out" after minimal amb trials. Activity Tolerance   Activity Tolerance Patient limited by fatigue;Patient limited by pain   Medical Staff Made Aware Spoke to  [address] to RN Lexie   Assessment   Prognosis Fair   Problem List Decreased strength;Decreased endurance; Impaired balance;Decreased mobility; Decreased skin integrity;Obesity   Assessment Pt seen for PT treatment session with focus on: transfer training and gait training. Pt has started chemotherapy since last PT session, though no other significant medical changes since last session. In tx session, pt displayed progression to S for all STS transfers with use of armrests and verbal cues; need for PT to manage extensive lines. MINAx1 for SPT from chair to commode due to lowered height of commode. Initial MINAx1 progressing to S for amb of 8 ft + 10 ft with use of RW and chair follow. Vitals monitored and stable t/o session. Pt reports fatigue following session, stating she is "wiped out". Pt is visibly frustrated with current situation, though she is agreeable to PT session. Pt continues to present below their baseline level of mobility due to decreased strength, endurance, balance, and functional mobility tolerance. Next session, pt will continue to benefit from gait training and HEP development and implementation as tolerated to address ongoing deficits. At this time PT is continuing to recommend post acute rehab upon d/c to address functional and mobility deficits.    Barriers to Discharge Inaccessible home environment;Decreased caregiver support  (Pt is at home alone during the day; + PAUL her home)   Goals   Patient Goals "get out of here"   STG Expiration Date 10/01/23   Short Term Goal #1 Pt will be able to consistently complete bed mobility with S in order to promote independence and mobility. Pt will be able to consistently complete all transfers with S in order to increase function and decrease burden of care. Pt will be able to ambulate >25 ft with S and use of LRAD to mimic household distances and increase pt function. Pt will be able to ambulate 100 ft with no greater than MINAx1 and use of RW to mimic limited community distances and increase pt mobility potential.  Pt will be able to navigate 3 stairs with no greater than MODAx1 and use of LRAD as appropriate in order to mimic home environment and increase pt independence. Pt will improve LE strength to >/= 4/5 in order to increase strength and functional potential.  Pt will improve AM-PAC score to 19/24 in order to increase function and independence. Pt will improve Barthel score to 45/100 in order to increase independence with ADLs. PT Treatment Day 1   Plan   Treatment/Interventions Functional transfer training;LE strengthening/ROM; Elevations; Therapeutic exercise; Endurance training;Patient/family training;Equipment eval/education; Bed mobility;Gait training;Spoke to nursing;Spoke to case management   PT Frequency 3-5x/wk   Recommendation   PT Discharge Recommendation Post acute rehabilitation services   AM-PAC Basic Mobility Inpatient   Turning in Flat Bed Without Bedrails 3   Lying on Back to Sitting on Edge of Flat Bed Without Bedrails 2   Moving Bed to Chair 3   Standing Up From Chair Using Arms 3   Walk in Room 3   Climb 3-5 Stairs With Railing 1   Basic Mobility Inpatient Raw Score 15   Basic Mobility Standardized Score 36.97   Highest Level Of Mobility   JH-HLM Goal 4: Move to chair/commode   JH-HLM Achieved 6: Walk 10 steps or more   End of Consult   Patient Position at End of Consult Bedside chair; All needs within reach  (OT in room)     The patient's AM-PAC Basic Mobility Inpatient Short Form Raw Score is 15. A Raw score of less than or equal to 16 suggests the patient may benefit from discharge to post-acute rehabilitation services. Please also refer to the recommendation of the Physical Therapist for safe discharge planning.     Melo Ness, ALEX  09/26/23

## 2023-09-26 NOTE — PLAN OF CARE
Problem: OCCUPATIONAL THERAPY ADULT  Goal: Performs self-care activities at highest level of function for planned discharge setting. See evaluation for individualized goals. Description: Treatment Interventions: ADL retraining, Functional transfer training, Endurance training, Patient/family training, Equipment evaluation/education, Compensatory technique education, Activityengagement, Energy conservation          See flowsheet documentation for full assessment, interventions and recommendations. Outcome: Progressing  Note: Limitation: Decreased ADL status, Decreased Safe judgement during ADL, Decreased cognition, Decreased endurance, Decreased self-care trans, Decreased high-level ADLs (impaired balance, fxnl mobility, act mike, trunk control, standing mike, strength, pacing, problem solving)  Prognosis: Good  Assessment: Pt seen for skilled OT treatment pt received sitting in chair s/p PT session. Pt reports fatigue from PT session prior to arrival. Pt agreeable to trial ADL tasks. Demonstrating improved LB dressing tasks. Due to fatigue declining participation in bathing and UB dressing tasks. Demonstrating improved standing tolerance during pulling up of underwear. Declining further participation in OT treatment at this time due to fatigue. Pt educated on importance of participation and edu provided on pacing of tasks.  Would cont to benefit from skilled OT treatment, will cont to follow     OT Discharge Recommendation: Post acute rehabilitation services

## 2023-09-26 NOTE — ASSESSMENT & PLAN NOTE
· 9/14 Neck/ soft tissue CT: Large enhancing soft tissue mass identified within the left neck involving multiple spaces. This appears to be centered within the left oropharynx with extensions as described above into multiple spaces. This results in significant airway compromise. 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 Small level 3 and level 4 nodes are seen within the neck. · Tracheostomy by ENT, bx & addition testing performed  · Bx: Positive for malignancy, favor poorly differentiated carcinoma. Cannot entirely exclude a high grade large cell lymphoma. Portion of specimen submitted for flow cytometry. · H/o tobacco abuse, daily smoker. · Daughter was updated at bedside. · Preliminary biopsy: Large B-cell lymphoma, germinal center B-cell type, c-MYC and BCL-2 double expression. · First chemotherapy on 9/22.     Plan:  · ENT/ Med onc following, appreciated  · Follow final biopsy report  · Off vent for 48hrs

## 2023-09-26 NOTE — PLAN OF CARE
Problem: PHYSICAL THERAPY ADULT  Goal: Performs mobility at highest level of function for planned discharge setting. See evaluation for individualized goals. Description: Treatment/Interventions: Functional transfer training, LE strengthening/ROM, Elevations, Therapeutic exercise, Endurance training, Patient/family training, Equipment eval/education, Bed mobility, Gait training, Spoke to nursing, Spoke to case management          Prognosis: Fair  Problem List: Decreased strength, Decreased endurance, Impaired balance, Decreased mobility, Decreased skin integrity, Obesity  Assessment: Pt seen for PT treatment session with focus on: transfer training and gait training. Pt has started chemotherapy since last PT session, though no other significant medical changes since last session. In tx session, pt displayed progression to S for all STS transfers with use of armrests and verbal cues; need for PT to manage extensive lines. MINAx1 for SPT from chair to commode due to lowered height of commode. Initial MINAx1 progressing to S for amb of 8 ft + 10 ft with use of RW and chair follow. Vitals monitored and stable t/o session. Pt reports fatigue following session, stating she is "wiped out". Pt is visibly frustrated with current situation, though she is agreeable to PT session. Pt continues to present below their baseline level of mobility due to decreased strength, endurance, balance, and functional mobility tolerance. Next session, pt will continue to benefit from gait training and HEP development and implementation as tolerated to address ongoing deficits. At this time PT is continuing to recommend post acute rehab upon d/c to address functional and mobility deficits.   Barriers to Discharge: Inaccessible home environment, Decreased caregiver support (Pt is at home alone during the day; + PAUL her home)     PT Discharge Recommendation: Post acute rehabilitation services    See flowsheet documentation for full assessment.          Wallace Rocha, SPT  09/26/23

## 2023-09-26 NOTE — ASSESSMENT & PLAN NOTE
Lab Results   Component Value Date    EGFR 89 09/26/2023    EGFR 85 09/25/2023    EGFR 80 09/24/2023    CREATININE 0.68 09/26/2023    CREATININE 0.72 09/25/2023    CREATININE 0.76 09/24/2023     Stable.

## 2023-09-26 NOTE — PROGRESS NOTES
OTOLARYNGOLOGY PROGRESS NOTE    Date of Service: 9/26/2023 9:58 AM    HPI  Patient is a 69 y.o. female w/ recent COVID/pneumonia requiring admission for infectious control earlier this yr, recently dc, [facility] on 9/13 due to SOB and hypoxia, w/ limited oral cavity space observed. Pt agreed to intubation for airway management on 9/14. CT imaging demonstrated L naso/oropharyngeal mass w/ some level 3/4 neck LAD, new R middle lobe patchy consolidation. Daughter at bedside answered questions, including pt is an active tobacco user for many yr, and has complained of worsening difficulty w/ breathing, "gum swelling," and discomfort at back of mouth for past 1.5-2wk. Pt's cause of acute difficult airway was attributed to angioedema due to lisinopril, w/ previous "angioedema" episode in 2020. Ddx being cancer vs infectious etiologies. Overnight Events: tolerated overnight without any ventilator support. Scheduled for PEG 9/27. PHYSICAL EXAM:  Vitals:    09/26/23 0900   BP: (!) 129/101   Pulse: 87   Resp: 19   Temp: 98.9 °F (37.2 °C)   SpO2:         General: Resting calming in the chair watching television  HEENT:  7 uncuffed proximal XLT tracheostomy in place  Neurology: Grossly CN I - XII intact. Lungs:  On trach mask  Cardio: RRR, well perfused  Abd: soft, NT, ND       Intake/Output Summary (Last 24 hours) at 9/26/2023 0958  Last data filed at 9/26/2023 0400  Gross per 24 hour   Intake 1646 ml   Output 500 ml   Net 1146 ml         LABORATORY    Recent Labs     09/24/23  0500 09/25/23  0539 09/26/23  0504   WBC 13.95* 11.23* 8.67   HGB 11.0* 11.2* 11.7   HCT 34.2* 34.7* 36.4    204 219       Recent Labs     09/24/23  0500 09/25/23  0539 09/26/23  0504   K 3.6 3.4* 3.7    106 99   BUN 28* 28* 27*   PHOS 2.1* 2.2* 2.3   MG 2.0 1.9 2.0       Invalid input(s): "PTPAT", "PTINR", "APTTMNNM", "APTTPAT"      Patient Active Problem List   Diagnosis   • Benign essential hypertension   • Bipolar I disorder, single manic episode (HCC)   • Disc degeneration, lumbar   • 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   • Hyperlipidemia   • Lumbar radiculopathy   • Myofascial pain syndrome   • Pain syndrome, chronic   • Osteoarthritis of spine with radiculopathy, lumbar region   • Angio-edema, initial encounter   • Abnormal computed tomography angiography (CTA)   • Bipolar disorder, unspecified (HCC)   • Nicotine dependence   • Bone lesion   • CKD (chronic kidney disease) stage 3, GFR 30-59 ml/min (HCC)   • Acute respiratory failure with hypoxia secondary to oropharyngeal mass   • (HFpEF) heart failure with preserved ejection fraction (HCC)   • Pulmonary embolism (HCC)   • Vaginal itching   • Ambulatory dysfunction   • Angioedema   • Oropharyngeal mass   • Blister (nonthermal) of left forearm, sequela   • Large cell lymphoma (HCC)   • Chemotherapy induced neutropenia (HCC)   • Chronic Superficial thrombophlebitis     Procedure: Tracheotomy change    Indications: Ongoing tracheotomy management    Findings: well granulated stoma site    Procedure in detail: After informed verbal consent was obtained the patient, patient was laid in the supine position with neck extended. While holding tracheotomy in place, tracheotomy was unsecured from the neck, cutting all sutures and trach ties. 7-0 cuffed shiley Trach was removed. 7-0 uncuffed Unk Rome was placed through the tracheotomy tract. Felisha Makrystle was resecured using soft trach ties. Patient tolerated procedure well and returned the care of nursing in stable condition. ASSESSMENT  Patient is a 69 y.o. female w/ acute and chronic problems as above, w/ naso/oropharyngeal mass observed on CT, likely inducing diminished airway space, requiring intubation for airway protection, now POD9 from trach. Trach changed bedside to 7-0 proximal XLT cuffless.     PLAN  - Recommend speech evaluation for both swallow and PMV capability once we transition to cuffless tracheostomy tube.  - Recommend at least bedside swallow by SLP prior to PEG placement as this may obviate the need. - Biopsy consistent with lymphoma. Management per medical oncology. Casey Azar. Marisela Oden MD PGY-3  [facility] Otolaryngology - Head and Neck Surgery  Available on [place] griddig Text. Please contact ENT Resident [place] Text Role for any questions or concerns.

## 2023-09-26 NOTE — ASSESSMENT & PLAN NOTE
· Home regimen: Oxycodone 5 mg twice daily as needed. Tylenol 650 mg every 8 hours as needed. Gabapentin 600 mg 3 times daily. Medical marijuana. · Per daughter, patient was overusing Tylenol over-the-counter. · Pain mostly from lumbar radiculopathy. · Impaired ambulatory dysfunction second to pain. Plan:  · Continue gabapentin 300 mg 3 times daily, scheduled oxycodone 5 mg 3 times daily, as needed Tylenol 650 mg every 6 hours. As needed fentanyl 50 mcg every 2 hours for severe pain.

## 2023-09-26 NOTE — ASSESSMENT & PLAN NOTE
9/23: Patient C/o right forearm soreness. Bilateral upper extremity ultrasound was performed in setting of high risk of DVT. RIGHT UPPER LIMB U/S: No evidence of acute or chronic deep vein thrombosis. 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 Doppler evaluation shows a normal response to augmentation maneuvers.

## 2023-09-26 NOTE — NUTRITION
Recommend switching to standard tube feed formula. Jevity 1.5 @ 50 ml/hr. Water flushes of 150 ml q 4 hrs. Start @ 20 ml/hr and advance by 15 ml q 4 hrs until goal rate is reached. At goal TF regimen provides 1800 kcals, 77 g protein, and 1812 ml total fluids from formula + flushes. See Nutrition note dated 9/26/2023  for additional details. Completed nutrition assessment is viewable in the nutrition documentation.

## 2023-09-26 NOTE — PROGRESS NOTES
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 CT AP with no lymphadenopathy; Brain MRI no lesion; stage II Large B Cell Lymphoma. She is s/p tracheostomy placement on 9/17/23. She started her dose-adjusted systemic treatment, R-EPOCH, on 9/22/23 with the last dose being today, 9/25/23. She is scheduled to have Rituxan infusion tomorrow at 9/26/23. Her CMP and uric acid have been unremarkable for tumor lysis. PICC line intact. Patient is planned to have peg tube placement while inpatient. Overall, patient tolerating chemotherapy very well. She concluded with her chemotherapy yesterday. She is scheduled for her Rituxan infusion today, 9/26/23. 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 Question gallbladder wall thickening versus motion artifact. If there is clinical concern for gallbladder pathology, ultrasound is recommended. Chronic T12 lytic lesion with pathologic fracture.     CT Neck mass (9/14/23)- Large enhancing soft tissue mass identified within the left neck involving multiple spaces. This appears to be centered within the left oropharynx with extensions as described above into multiple spaces. This results in significant airway compromise. Suggestive of primary head and neck neoplasm. Small level 3 and level 4 nodes are seen within the neck.     CMP  Uric acid 2.4 < 2.7 < 2.8  Potassium 3.7 < 3.4 < 3.6 < 3.4  Calcium 10.5 < 9.8 < 8.8   Total Bili 0.38 <0.30  Phosphorus 2.3 <2.2 < 2.1 < 2.5    CBC  Hemoglobin 11.7 <11.2 < 10.6 < 10.9  WBC 8.67 < 11.23 < 13.95 < 9.72  Platelets 273 < 354 < 221 < 181    PLAN:  1) She is s/p Day 3 Cycle 1 EPOCH (etoposidevincristine, cyclophosphamide, doxorubicin) . Proceed with Rituxamab today, 9/26/23. Discussed possible transfusion side effects with patient with the plan for immunotherapy titration per Oncology RN. Patient's daughter at the bedside. 2) Continue to monitor for tumor lysis syndrome. CMP unremarkable with the exception of mild elevation of corrected calcium at 10.5. Her uric acid remains WNL at 2.2. Daily CBCs, CMP, uric acid, phosphorus    3) Continue to monitor renal and hepatic function (etoposide, cyclophosphamide). 4) Prophylaxis medications in place    5) Outpatient follow up plan: Dr. Dulce Pan 10/03/23     Subjective:    Review of Systems   Constitutional: Negative for chills, diaphoresis and fever. HENT: Negative for ear pain. Neck mass    Eyes: Negative for pain and visual disturbance. Respiratory: Negative for cough, chest tightness, shortness of breath and wheezing. Cardiovascular: Negative for chest pain and palpitations. Gastrointestinal: Negative for abdominal pain, blood in stool, diarrhea, nausea and vomiting. Genitourinary: Negative for difficulty urinating and hematuria. Musculoskeletal: Negative for back pain. Neurological: Negative for dizziness, weakness and headaches. Psychiatric/Behavioral: Negative for agitation.      Objective:     Medication Administration - last 24 hours from 09/25/2023 1448 to 09/26/2023 1448       Date/Time Order Dose Route Action Action by     09/26/2023 0817 EDT chlorhexidine (PERIDEX) 0.12 % oral rinse 15 mL 15 mL Mouth/Throat Given Brianna Vazquez RN     09/25/2023 2132 EDT chlorhexidine (PERIDEX) 0.12 % oral rinse 15 mL 15 mL Mouth/Throat Given Eris Monteiro RN     09/26/2023 4662 EDT busPIRone (BUSPAR) tablet 30 mg 30 mg Oral Given Hannah Young, RN     09/25/2023 2137 EDT busPIRone (BUSPAR) tablet 30 mg 30 mg Oral Given Hanna Callejas, RN     09/25/2023 1602 EDT busPIRone (BUSPAR) tablet 30 mg 30 mg Oral Given Hannah Young, RN     09/26/2023 0206 EDT sertraline (ZOLOFT) tablet 50 mg 50 mg Oral Given Hannah Young, RN     09/26/2023 1257 EDT nystatin (MYCOSTATIN) oral suspension 500,000 Units 500,000 Units Swish & Swallow Given Hannah Young, RN     09/26/2023 0818 EDT nystatin (MYCOSTATIN) oral suspension 500,000 Units 500,000 Units Swish & Swallow Given Hannah Young, RN     09/25/2023 2137 EDT nystatin (MYCOSTATIN) oral suspension 500,000 Units 500,000 Units Swish & Swallow Given Hanna Callejas, RN     09/25/2023 1713 EDT nystatin (MYCOSTATIN) oral suspension 500,000 Units 500,000 Units Swish & Swallow Given Hannah Young, RN     09/26/2023 1328 EDT levalbuterol (XOPENEX) inhalation solution 1.25 mg 1.25 mg Nebulization Given Claudis Natasha, RT     09/26/2023 2329 EDT levalbuterol (Tally Clause) inhalation solution 1.25 mg 1.25 mg Nebulization Given Claudis Natasha, RT     09/26/2023 0207 EDT levalbuterol (Tally Clause) inhalation solution 1.25 mg 1.25 mg Nebulization Given Ruthann Fester Tarnabod, RT     09/25/2023 1934 EDT levalbuterol (Tally Clause) inhalation solution 1.25 mg 1.25 mg Nebulization Given [employer], RT     09/26/2023 1328 EDT ipratropium (ATROVENT) 0.02 % inhalation solution 0.5 mg 0.5 mg Nebulization Given Claudis Natasha, RT     09/26/2023 4612 EDT ipratropium (ATROVENT) 0.02 % inhalation solution 0.5 mg 0.5 mg Nebulization Given Claudis Natasha, RT     09/26/2023 0207 EDT ipratropium (ATROVENT) 0.02 % inhalation solution 0.5 mg 0.5 mg Nebulization Given Ruthann Avendaño, RT     09/25/2023 1934 EDT ipratropium (ATROVENT) 0.02 % inhalation solution 0.5 mg 0.5 mg Nebulization Given Ruthann Avendaño, RT     09/26/2023 5603 EDT gabapentin (NEURONTIN) capsule 300 mg 300 mg Oral Given Hannah Young RN     09/25/2023 2137 EDT gabapentin (NEURONTIN) capsule 300 mg 300 mg Oral Given [place] Robert, RN     09/25/2023 1602 EDT gabapentin (NEURONTIN) capsule 300 mg 300 mg Oral Given Geraldo Justina, RN     09/26/2023 9337 EDT nicotine (NICODERM CQ) 21 mg/24 hr TD 24 hr patch 21 mg 21 mg Transdermal Medication Applied Geraldo Horn, RN     09/26/2023 0816 EDT nicotine (NICODERM CQ) 21 mg/24 hr TD 24 hr patch 21 mg 21 mg Transdermal Patch Removed Geraldo Justina, RN     09/26/2023 0815 EDT enoxaparin (LOVENOX) subcutaneous injection 40 mg 40 mg Subcutaneous Given Geraldo Justina, RN     09/26/2023 1487 EDT amLODIPine (NORVASC) tablet 10 mg 10 mg Oral Given Geraldo Justina, RN     09/25/2023 2137 EDT senna oral syrup 8.8 mg 8.8 mg Per NG Tube Given [place] Robert, RN     09/26/2023 1428 EDT guaiFENesin (ROBITUSSIN) oral liquid 200 mg 200 mg Oral Given Geraldo Justina, RN     09/26/2023 0820 EDT guaiFENesin (ROBITUSSIN) oral liquid 200 mg 200 mg Oral Given Geraldo Justina, RN     09/26/2023 0301 EDT guaiFENesin (ROBITUSSIN) oral liquid 200 mg 200 mg Oral Given Zoraida Robert, RN     09/25/2023 2136 EDT guaiFENesin (ROBITUSSIN) oral liquid 200 mg 200 mg Oral Given Zoraida Robert, RN     09/26/2023 0820 EDT oxyCODONE (ROXICODONE) oral solution 2.5 mg -- Oral See Alternative Geraldo Justina, RN     09/26/2023 0301 EDT oxyCODONE (ROXICODONE) oral solution 2.5 mg -- Oral See Alternative [place] Robert, RN     09/26/2023 0820 EDT oxyCODONE (ROXICODONE) oral solution 5 mg 5 mg Oral Given Geraldo Justina, RN     09/26/2023 0301 EDT oxyCODONE (ROXICODONE) oral solution 5 mg 5 mg Oral Given Zoraida Robert, RN     09/26/2023 7383 EDT famotidine (PEPCID) tablet 20 mg 20 mg Oral Given Geraldo Horn RN     09/25/2023 1713 EDT famotidine (PEPCID) tablet 20 mg 20 mg Oral Given Geraldo Horn RN     09/26/2023 1259 EDT allopurinol (ZYLOPRIM) tablet 300 mg -- Oral MAR Costa Curtis MD     09/26/2023 1258 EDT allopurinol (ZYLOPRIM) tablet 300 mg -- Oral MAR Alina Arrieta MD 09/26/2023 5736 EDT allopurinol (ZYLOPRIM) tablet 300 mg 300 mg Oral Given Prachi Hackett RN     09/26/2023 1259 EDT levofloxacin (LEVAQUIN) tablet 500 mg -- Oral MAR Unhold Nicole Judd MD     09/26/2023 1258 EDT levofloxacin (LEVAQUIN) tablet 500 mg -- Oral MAR Alina Judd MD     09/26/2023 1457 EDT levofloxacin (LEVAQUIN) tablet 500 mg 500 mg Oral Given Prachi Hackett RN     09/26/2023 1259 EDT fluconazole (DIFLUCAN) tablet 400 mg -- Oral MAR Unhold Nicole Judd MD     09/26/2023 1258 EDT fluconazole (DIFLUCAN) tablet 400 mg -- Oral MAR Hold Nicole Judd MD     09/26/2023 0833 EDT fluconazole (DIFLUCAN) tablet 400 mg 400 mg Oral Given Prachi Hackett RN     09/26/2023 1259 EDT acyclovir (ZOVIRAX) capsule 400 mg -- Oral MAR Unhold Nicole Judd MD     09/26/2023 1258 EDT acyclovir (ZOVIRAX) capsule 400 mg -- Oral MAR Alina Judd MD     09/26/2023 5301 EDT acyclovir (ZOVIRAX) capsule 400 mg 400 mg Oral Given Prachi Hackett RN     09/25/2023 1713 EDT acyclovir (ZOVIRAX) capsule 400 mg 400 mg Oral Given Prachi Hackett RN     09/26/2023 1259 EDT sulfamethoxazole-trimethoprim (BACTRIM DS) 800-160 mg per tablet 1 tablet -- Oral HIWOT Peck MD     09/26/2023 1258 EDT sulfamethoxazole-trimethoprim (BACTRIM DS) 800-160 mg per tablet 1 tablet -- Oral MAR Alina Judd MD     09/26/2023 1259 EDT alteplase (CATHFLO) injection 2 mg -- Intracatheter HIWOT Peck MD     09/26/2023 1258 EDT alteplase (CATHFLO) injection 2 mg -- Intracatheter MAR Alina Judd MD     09/26/2023 1259 EDT sodium chloride 0.9 % infusion -- Intravenous HIWOT Peck MD     09/26/2023 1258 EDT sodium chloride 0.9 % infusion -- Intravenous HIWOT Johnson MD     09/26/2023 1142 EDT sodium chloride 0.9 % infusion 20 mL/hr Intravenous New Dwayne Kim RN     09/26/2023 1259 EDT predniSONE tablet 100 mg -- Oral MAR Unhold Madison A Merline Ross MD     09/26/2023 1258 EDT predniSONE tablet 100 mg -- Oral MAR Hold Misty Phelps MD     09/26/2023 5959 EDT predniSONE tablet 100 mg 100 mg Oral Given Sherrie Marcano, TINO     09/25/2023 1602 EDT predniSONE tablet 100 mg 100 mg Oral Given Sherrie Marcano, TINO     09/26/2023 1259 EDT ondansetron (ZOFRAN) injection 4 mg -- Intravenous MAR Nabil Mcdonald MD     09/26/2023 1258 EDT ondansetron (ZOFRAN) injection 4 mg -- Intravenous MAR Hold Misty Phelps MD     09/26/2023 1000 EDT ondansetron (ZOFRAN) 16 mg, dexamethasone (DECADRON) 10 mg in sodium chloride 0.9 % 50 mL IVPB -- Intravenous Stopped Blayne Liang RN     09/26/2023 0935 EDT ondansetron (ZOFRAN) 16 mg, dexamethasone (DECADRON) 10 mg in sodium chloride 0.9 % 50 mL IVPB -- Intravenous New Bag Blayne Liang RN     09/26/2023 0900 EDT DOXOrubicin (ADRIAMYCIN) 18 mg, etoposide (TOPOSAR) 90 mg, vinCRIStine (ONCOVIN) 0.7 mg in sodium chloride 0.9 % 500 mL infusion -- Intravenous Stopped Blayne Liang RN     09/26/2023 0900 EDT sodium chloride 0.9 % bolus 2,000 mL 2,000 mL Intravenous [address]  Blayne Liang RN     09/26/2023 1231 EDT cyclophosphamide (CYTOXAN) 1,350 mg in sodium chloride 0.9 % 250 mL IVPB 0 mg Intravenous Stopped Jovita Gómez RN     09/26/2023 1147 EDT cyclophosphamide (CYTOXAN) 1,350 mg in sodium chloride 0.9 % 250 mL IVPB 1,350 mg Intravenous [address] Jovita Gómez RN     09/26/2023 1428 EDT oxyCODONE (ROXICODONE) oral solution 5 mg 5 mg Oral Given Sherrie Marcano, TINO     09/26/2023 0615 EDT oxyCODONE (ROXICODONE) oral solution 5 mg 5 mg Oral Not Given Wiley Graham RN     09/25/2023 2137 EDT oxyCODONE (ROXICODONE) oral solution 5 mg 5 mg Oral Given Wiley Graham, TINO     09/26/2023 1442 EDT hydrALAZINE (APRESOLINE) injection 10 mg 10 mg Intravenous Given Sherrie Marcano, TINO     09/26/2023 0432 EDT hydrALAZINE (APRESOLINE) injection 10 mg 10 mg Intravenous Given Gustave Cooks, TINO     09/25/2023 1923 EDT hydrALAZINE (APRESOLINE) injection 10 mg 10 mg Intravenous Given Laisha Russo RN     09/25/2023 1713 EDT hydrALAZINE (APRESOLINE) injection 10 mg 10 mg Intravenous Given Lelia Villela RN     09/26/2023 1328 EDT sodium chloride 3 % inhalation solution 4 mL 4 mL Nebulization Given Stann Sarah, RT     09/26/2023 3902 EDT sodium chloride 3 % inhalation solution 4 mL 4 mL Nebulization Given Stann Sarah, RT     09/26/2023 0207 EDT sodium chloride 3 % inhalation solution 4 mL 4 mL Nebulization Given [employer], [place]     09/25/2023 1934 EDT sodium chloride 3 % inhalation solution 4 mL 4 mL Nebulization Given [employer], [place]     09/26/2023 4199 EDT metoprolol tartrate (LOPRESSOR) tablet 25 mg 25 mg Oral Given Lelia Villela RN     09/25/2023 2137 EDT metoprolol tartrate (LOPRESSOR) tablet 25 mg 25 mg Oral Given Laisha Russo RN     09/26/2023 5339 EDT lisinopril (ZESTRIL) tablet 30 mg 30 mg Oral Given Lelia Villela RN     09/26/2023 7689 EDT potassium chloride oral solution 40 mEq 40 mEq Per NG Tube Given Lelia Villela RN          BP (!) 192/91   Pulse 82   Temp 98.8 °F (37.1 °C) (Oral)   Resp 22   Ht 5' 1" (1.549 m)   Wt 81.1 kg (178 lb 12.7 oz)   SpO2 97%   BMI 33.78 kg/m²       Physical Exam  Constitutional:       Appearance: She is not ill-appearing. HENT:      Head: Normocephalic and atraumatic. Comments: Trach in place, humidified. NG tube R nostril     Right Ear: External ear normal.      Left Ear: External ear normal.      Nose: No congestion or rhinorrhea. Mouth/Throat:      Mouth: Mucous membranes are moist.   Eyes:      Extraocular Movements: Extraocular movements intact. Conjunctiva/sclera: Conjunctivae normal.      Pupils: Pupils are equal, round, and reactive to light. Neck:      Comments: trach in place   Cardiovascular:      Rate and Rhythm: Normal rate and regular rhythm. Pulses: Normal pulses. Heart sounds: Normal heart sounds. No murmur heard. No gallop.    Pulmonary: Effort: Pulmonary effort is normal. No respiratory distress. Breath sounds: No wheezing, rhonchi or rales. Abdominal:      General: Bowel sounds are normal. There is no distension. Palpations: There is no mass. Tenderness: There is no abdominal tenderness. There is no guarding. Musculoskeletal:      Right lower leg: No edema. Left lower leg: No edema. Skin:     General: Skin is warm. Capillary Refill: Capillary refill takes less than 2 seconds. Coloration: Skin is not jaundiced or pale. Findings: Rash present. Neurological:      General: No focal deficit present. Mental Status: She is alert and oriented to person, place, and time. Psychiatric:         Mood and Affect: Mood normal.         Behavior: Behavior normal.         Thought Content:  Thought content normal.         Judgment: Judgment normal.           Recent Results (from the past 48 hour(s))   CBC and differential    Collection Time: 09/25/23  5:39 AM   Result Value Ref Range    WBC 11.23 (H) 4.31 - 10.16 Thousand/uL    RBC 3.57 (L) 3.81 - 5.12 Million/uL    Hemoglobin 11.2 (L) 11.5 - 15.4 g/dL    Hematocrit 34.7 (L) 34.8 - 46.1 %    MCV 97 82 - 98 fL    MCH 31.4 26.8 - 34.3 pg    MCHC 32.3 31.4 - 37.4 g/dL    RDW 15.3 (H) 11.6 - 15.1 %    MPV 9.6 8.9 - 12.7 fL    Platelets 214 741 - 315 Thousands/uL   Comprehensive metabolic panel    Collection Time: 09/25/23  5:39 AM   Result Value Ref Range    Sodium 139 135 - 147 mmol/L    Potassium 3.4 (L) 3.5 - 5.3 mmol/L    Chloride 106 96 - 108 mmol/L    CO2 30 21 - 32 mmol/L    ANION GAP 3 mmol/L    BUN 28 (H) 5 - 25 mg/dL    Creatinine 0.72 0.60 - 1.30 mg/dL    Glucose 150 (H) 65 - 140 mg/dL    Calcium 8.8 8.4 - 10.2 mg/dL    Corrected Calcium 9.8 8.3 - 10.1 mg/dL    AST 13 13 - 39 U/L    ALT 17 7 - 52 U/L    Alkaline Phosphatase 51 34 - 104 U/L    Total Protein 5.9 (L) 6.4 - 8.4 g/dL    Albumin 2.8 (L) 3.5 - 5.0 g/dL    Total Bilirubin 0.30 0.20 - 1.00 mg/dL eGFR 85 ml/min/1.73sq m   Phosphorus    Collection Time: 09/25/23  5:39 AM   Result Value Ref Range    Phosphorus 2.2 (L) 2.3 - 4.1 mg/dL   Uric acid    Collection Time: 09/25/23  5:39 AM   Result Value Ref Range    Uric Acid 2.4 2.0 - 7.5 mg/dL   LD,Blood    Collection Time: 09/25/23  5:39 AM   Result Value Ref Range     140 - 271 U/L   Magnesium    Collection Time: 09/25/23  5:39 AM   Result Value Ref Range    Magnesium 1.9 1.9 - 2.7 mg/dL   CBC and differential    Collection Time: 09/26/23  5:04 AM   Result Value Ref Range    WBC 8.67 4.31 - 10.16 Thousand/uL    RBC 3.84 3.81 - 5.12 Million/uL    Hemoglobin 11.7 11.5 - 15.4 g/dL    Hematocrit 36.4 34.8 - 46.1 %    MCV 95 82 - 98 fL    MCH 30.5 26.8 - 34.3 pg    MCHC 32.1 31.4 - 37.4 g/dL    RDW 14.9 11.6 - 15.1 %    MPV 9.5 8.9 - 12.7 fL    Platelets 662 464 - 047 Thousands/uL    nRBC 0 /100 WBCs   Comprehensive metabolic panel    Collection Time: 09/26/23  5:04 AM   Result Value Ref Range    Sodium 137 135 - 147 mmol/L    Potassium 3.7 3.5 - 5.3 mmol/L    Chloride 99 96 - 108 mmol/L    CO2 31 21 - 32 mmol/L    ANION GAP 7 mmol/L    BUN 27 (H) 5 - 25 mg/dL    Creatinine 0.68 0.60 - 1.30 mg/dL    Glucose 138 65 - 140 mg/dL    Calcium 9.6 8.4 - 10.2 mg/dL    Corrected Calcium 10.5 (H) 8.3 - 10.1 mg/dL    AST 14 13 - 39 U/L    ALT 19 7 - 52 U/L    Alkaline Phosphatase 52 34 - 104 U/L    Total Protein 6.2 (L) 6.4 - 8.4 g/dL    Albumin 2.9 (L) 3.5 - 5.0 g/dL    Total Bilirubin 0.38 0.20 - 1.00 mg/dL    eGFR 89 ml/min/1.73sq m   Phosphorus    Collection Time: 09/26/23  5:04 AM   Result Value Ref Range    Phosphorus 2.3 2.3 - 4.1 mg/dL   Uric acid    Collection Time: 09/26/23  5:04 AM   Result Value Ref Range    Uric Acid 2.2 2.0 - 7.5 mg/dL   LD,Blood    Collection Time: 09/26/23  5:04 AM   Result Value Ref Range     140 - 271 U/L   Magnesium    Collection Time: 09/26/23  5:04 AM   Result Value Ref Range    Magnesium 2.0 1.9 - 2.7 mg/dL       US right upper quadrant    Result Date: 9/22/2023  Narrative: RIGHT UPPER QUADRANT ULTRASOUND INDICATION:     CT finding N/V +Cancino. COMPARISON: CT abdomen/pelvis 9/21/2023 TECHNIQUE:   Real-time ultrasound of the right upper quadrant was performed with a curvilinear transducer with both volumetric sweeps and still imaging techniques. FINDINGS: Evaluation limited as per sonographer's note in PACS. PANCREAS:  Visualized portions of the pancreas are within normal limits. AORTA AND IVC:  Visualized portions are normal for patient age. LIVER: Size:  Within normal range. The liver measures 15.3 cm in the midclavicular line. Contour:  Surface contour is smooth. Parenchyma:  Echogenicity and echotexture are within normal limits. No liver mass identified. Limited imaging of the main portal vein shows it to be patent and hepatopetal. BILIARY: The gallbladder is normal in caliber. Gallbladder wall thickness upper limits of normal. No pericholecystic fluid. There is gallbladder sludge without evidence for shadowing calculi. No sonographic Cancino sign. No intrahepatic biliary dilatation. CBD measures 4.0 mm. No choledocholithiasis. KIDNEY: Right kidney measures 11.9 x 5.2 x 6.4 cm. Volume 207.6 mL Several simple cysts, the largest of which measures 4.1 cm. 2.8 cm septated cyst in the upper pole. No hydronephrosis or perinephric collection. ASCITES:   None. Impression: No cholelithiasis. Gallbladder sludge is present with wall thickness upper limits of normal. Negative sonographic Cancino sign. Findings may be sequela of chronic gallbladder dysfunction. Consider follow-up with a HIDA scan if it would alter patient management. Workstation performed: [id number]     CT abdomen pelvis w contrast    Result Date: 9/21/2023  Narrative: CT ABDOMEN AND PELVIS WITH IV CONTRAST INDICATION:   Head/neck cancer, staging lymohoma staging prior to treatment with chemo. COMPARISON: 9/13/2023.  TECHNIQUE:  CT examination of the abdomen and pelvis was performed. Multiplanar 2D reformatted images were created from the source data. This examination, like all CT scans performed in the [facility], was performed utilizing techniques to minimize radiation dose exposure, including the use of iterative reconstruction and automated exposure control. Radiation dose length product (DLP) for this visit:  813 mGy-cm IV Contrast:  100 mL of iohexol (OMNIPAQUE) Enteric Contrast:  Enteric contrast was not administered. FINDINGS: Mildly degraded by respiratory motion. ABDOMEN LOWER CHEST: There is bibasilar atelectasis. LIVER/BILIARY TREE:  Unremarkable. GALLBLADDER:  No calcified gallstones. There may be mild gallbladder wall thickening though evaluation is degraded by respiratory motion. No surrounding inflammatory changes. SPLEEN:  Unremarkable. PANCREAS:  Unremarkable. ADRENAL GLANDS:  Unremarkable. KIDNEYS/URETERS: Multiple bilateral renal cysts. No hydronephrosis. STOMACH AND BOWEL: There is colonic diverticulosis without evidence of acute diverticulitis. APPENDIX:  No findings to suggest appendicitis. ABDOMINOPELVIC CAVITY:  No ascites. No pneumoperitoneum. No lymphadenopathy. VESSELS:  Unremarkable for patient's age. PELVIS REPRODUCTIVE ORGANS:  Unremarkable for patient's age. URINARY BLADDER:  Unremarkable. ABDOMINAL WALL/INGUINAL REGIONS:  Fat containing right inguinal hernia noted. Fat-containing umbilical hernia. OSSEOUS STRUCTURES: Again seen is a destructive lytic lesion in the T12 vertebral body with sclerotic borders, progressed from 2013 and with chronic appearing pathologic fracture. Impression: 1. No abdominal lymphadenopathy. 2.  Question gallbladder wall thickening versus motion artifact. If there is clinical concern for gallbladder pathology, ultrasound is recommended. 3.  Chronic T12 lytic lesion with pathologic fracture.  Workstation performed: [id number]     MRI soft tissue neck wo and w contrast    Result Date: 9/21/2023  Narrative: MRI OF THE SOFT TISSUES OF THE NECK - WITH AND WITHOUT CONTRAST INDICATION: Oropharyngeal mass s/p biopsy (+) for carcinoma COMPARISON: Neck CT from 9/14/2023 TECHNIQUE:  Multiplanar, multisequence imaging of the soft tissues of the neck was performed before and after gadolinium administration. . IV Contrast:  7.5 mL of Gadobutrol injection (SINGLE-DOSE) IMAGE QUALITY: Motion degrades images. FINDINGS: VISUALIZED BRAIN PARENCHYMA: No acute or suspicious findings. Please see today's brain MR report. VISUALIZED ORBITS AND PARANASAL SINUSES: Globes and orbits are within normal limits. Pan paranasal sinus mucosal thickening, greatest involving ethmoids and sphenoids. NASAL CAVITY, NASOPHARYNX, and OROHYPOPHARYNX and LARYNX: Approximately 7.5 x 4.0 x 7.8 cm (length X depth X width) soft tissue mass, epicenter likely the left lateral pharyngeal recess. Enhances and restricts diffusion. Central, irregular fluid signal intensity area without enhancement appears contiguous with fluid around an endotracheal tube, likely trapped secretions and circumferential mass. Mass extends from the left greater than right nasopharynx superiorly to the cricoid cartilage inferiorly. Extends to the posterior nasal cavity. Displaces the soft palate, uvula and tongue base anteriorly. Oral cavity is otherwise within normal limits. Displaces the left pterygoid muscles anterolaterally. Extends posterior to the angle of the mandible approximately 1.4 cm, abutting and mildly laterally displacing the deep parotid, inseparable from the gland. Effaces the left parapharyngeal fat. Discrete epiglottis not seen, grossly anteriorly displaced. Extends along the left aryepiglottic fold and posterior pharyngeal wall to the to the inferior supraglottic airway, grossly terminating just above or at the left false cord.  Extends to the left carotid space, encircles the carotid with severe narrowing and posterior-lateral displacement of the distal cervical and most proximal petrous vessel. Otherwise preserved left internal carotid flow-void. Posterior-lateral displacement  and occlusion of the internal jugular vein at the level of the angle of the mandible in the distal neck. Mass extends to the proximal jugular foramen. Laryngeal ventricles and true cords appear preserved. Normal fat signal  preserved in the cricoid and thyroid cartilages. Enteric and endotracheal tubes are displaced rightward. Trace retropharyngeal effusion. THYROID GLAND:   Within normal limits. PAROTID AND SUBMANDIBULAR GLANDS: Both submandibular and the right parotid glands are within normal limits. Deep left parotid involvement mentioned above. LYMPH NODES: Similar small jugular chain nodes more numerous on the left than the right, but none considered pathologically enlarged. 0.7 x 0.5 cm suspicious right retropharyngeal node (4/27). Left retropharyngeal space is obliterated by mass, no separate adenopathy. VASCULAR STRUCTURES: Left carotid a jugular involvement described above. Right carotid artery and jugular veins are within normal limits. THORACIC INLET: Similar to CT. Dependent lung opacities. CERVICAL SPINE: Normal appearance. OTHER: Left greater than right mastoid effusions with patchy left enhancement and restricted diffusion were described in today's brain MR report. Asymmetric opacity involves the left petrous apex. Normal appearance of the IACs and inner ear structures. No cerebellopontine angle mass. Impression: Known, large left neck mass described in detail above. 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 COMPARISON: 6/17/2017 CT of the brain TECHNIQUE:  Multiplanar/multisequence MRI of the brain was performed administration of contrast. IV Contrast:  7.5 mL of Gadobutrol injection (SINGLE-DOSE) IMAGE QUALITY:  Diagnostic. FINDINGS: BRAIN PARENCHYMA: No  hemorrhage, extra-axial fluid collection, acute infarct, mass effect or midline shift. Mild, focal patchy periventricular and subcortical white matter foci of abnormal T2 and FLAIR hyperintensity are nonspecific, but usually secondary to changes of microangiopathy. Small developmental venous angioma in the left posterior frontal lobe, typically clinically insignificant. No suspicious enhancement or masses. VENTRICLES:  Within normal limits for age. SELLA AND PITUITARY GLAND:  Within normal limits. ORBITS:  Within normal limits. PARANASAL SINUSES: Mucosal thickening. VASCULATURE: Preserved skull base flow voids. Normal enhancement. CALVARIUM AND SKULL BASE: Bilateral mastoid effusions are likely secondary mass, related to eustachian tube obstruction from nasopharyngeal mass. Patient is also intubated. Small mucosal retention cyst at the right fossa of Rosenmuller. Small ill-defined foci of enhancement in the superior mastoid and asymmetric left mastoid restricted diffusion. No coalescence or discrete mass. Skull and visualized cervical spine are within normal limits. EXTRACRANIAL SOFT TISSUES:  A nasopharyngeal mass will be described in further detail on today's neck MR report. Impression: No evidence of brain metastases. 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 Asymmetric patchy enhancement and restricted diffusion in the left mastoid. The differential includes neoplastic involvement and infection. Workstation performed: [id number]     XR chest portable    Result Date: 9/20/2023  Narrative: CHEST INDICATION:   NG tube placement. COMPARISON: 9/17/2023 EXAM PERFORMED/VIEWS:  XR CHEST PORTABLE FINDINGS: Tracheostomy tube is in stable position. Distal portions of nasogastric tube are seen below the hemidiaphragm within the left upper abdomen. The tip of the tube extends beyond the inferior margin of this study.  Heart shadow is enlarged but unchanged from prior exam. There is mild pulmonary vascular congestion. The left costophrenic angle is obscured. Hazy opacities are additionally noted within the right costophrenic angle. No evidence of acute osseous abnormality. Impression: 1. Nasogastric tube is seen with its distal portions within the stomach. The tip of the tube courses beyond the inferior margin of the study. 2. Pulmonary vascular congestion with obscuration of the left hemidiaphragm. This is stable from prior radiograph and may represent small left effusion versus consolidation. 3. Right basilar atelectasis versus trace right effusion. Workstation performed: [id number]     XR chest portable    Result Date: 9/18/2023  Narrative: CHEST INDICATION:   Assess. COMPARISON:  None EXAM PERFORMED/VIEWS:  XR CHEST PORTABLE Images: 2 FINDINGS: Tracheostomy tube is seen in appropriate position. A feeding tube is seen extending down below the dome of the diaphragm Cardiomegaly seen Increased lung markings seen suggest congestion left base is obscured Osseous structures appear within normal limits for patient age. Impression: Increased lung markings seen suggest congestion. The left base is obscured No worsening Workstation performed: [id number]     CT soft tissue neck w contrast    Result Date: 9/14/2023  Narrative: CT NECK WITH CONTRAST INDICATION:   Laryngeal edema COMPARISON:  None. TECHNIQUE:  Axial, sagittal, and coronal 2D reformatted images were created from the axial source data and submitted for interpretation. Radiation dose length product (DLP) for this visit:  769 mGy-cm . This examination, like all CT scans performed in the [facility], was performed utilizing techniques to minimize radiation dose exposure, including the use of iterative reconstruction and automated exposure control. IV Contrast:  85 mL of iohexol (OMNIPAQUE) IMAGE QUALITY:  Diagnostic.  FINDINGS: VISUALIZED BRAIN PARENCHYMA: No acute intracranial pathology of the visualized brain parenchyma. VISUALIZED ORBITS: Normal visualized orbits. PARANASAL SINUSES: Normal visualized paranasal sinuses. Nasopharynx, oropharynx AND HYPOPHARYNX: There is a large heterogeneously enhancing mass identified within the neck involving multiple spaces. The origin is difficult to ascertain given the large size. This mass measures approximately 6.1 x 4.7 x 5.2 cm and appears to be centered within the left oropharynx. The mass extends superiorly into the nasopharynx left more so than right and into the posterior aspect of the nasal cavity. The mass also extends across the midline within the oropharynx and inferiorly into the left lateral oropharynx but not below the laryngeal ventricle. The mass extends laterally into the infratemporal fossa and parapharyngeal fat and is inseparable from infratemporal musculature and deep lobe of the parotid gland. There is extension into the prevertebral space where the mass is inseparable from the anterior paraspinal muscle on the left and posteriorly and laterally into the carotid space where the mass is displacing and inseparable from jugular and carotid. The mass encases the cervical internal carotid artery just below the level of the skull base resulting in narrowing of the vessel and the mass compresses and occludes the jugular vein below the skull base. The vessel does reconstitute via collateral vessels as it  extends inferiorly within the neck. The mass extends superiorly into the skull base inseparable from the carotid canal and jugular foramen. There is severe airway compromise within the posterior oral cavity and superior oropharynx. ET tube and OG tube are present. THYROID GLAND:  Unremarkable. PAROTID AND SUBMANDIBULAR GLANDS: Normal parotid and submandibular glands other than the mass abutting the deep lobe of the left parotid gland. LYMPH NODES: Multiple small jugular chain nodes are seen on the left.  VASCULAR STRUCTURES: As described above the mass partially encases the cervical internal carotid artery, narrowing the vessel and occludes the jugular vein from the skull base to the mid neck. Below this the vessel is unremarkable due to collateral reconstitution. THORACIC INLET: Posterior left upper lobe consolidation may represent atelectasis or pneumonia. Mild patchy groundglass opacity within the upper lung fields. BONY STRUCTURES: At T1-2 there is a left paramedian bridging osteophyte resulting in mild to moderate left anterior lateral canal stenosis. Impression: Large enhancing soft tissue mass identified within the left neck involving multiple spaces. This appears to be centered within the left oropharynx with extensions as described above into multiple spaces. This results in significant airway compromise. 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 Small level 3 and level 4 nodes are seen within the neck. I personally discussed this study with ICU resident on 9/14/2023 4:44 PM. Workstation performed: [id number]     Bronchoscopy    Result Date: 9/14/2023  Narrative: Table formatting from the original result was not included. [address] [place] [address] [phone] DATE OF SERVICE: 9/14/23 PHYSICIAN(S): Attending: No Staff Documented Fellow: No Staff Documented INDICATION: Acute respiratory failure with hypoxia ([address]) POST-OP DIAGNOSIS: See the impression below. PREPROCEDURE: Standard airway preparation completed per respiratory therapy protocol. Informed consent was obtained. Images reviewed prior to the procedure. A Time Out was performed. No suspicion or identified risk for TB or other airborne infectious disease; bronchoscopy procedure being performed for diagnostic purposes. PROCEDURE: Bronchoscopy DETAILS OF PROCEDURE: Patient was taken to the procedure room where a time out was performed to confirm correct patient and correct procedure.  The patient was already under sedation prior to the procedure. The patient's blood pressure, ECG, heart rate, level of consciousness, respirations and oxygen were monitored throughout the procedure. The patient experienced no blood loss. The scope was introduced through the endotracheal tube. The procedure was moderately difficult due to patient intolerance and persistent coughing. In response to procedure difficulty, deeper sedation was employed. The patient tolerated the procedure well. There were no apparent adverse events. ANESTHESIA INFORMATION: ASA: ASA status not filed in the log. Anesthesia Type: Anesthesia type not filed in the log.  FINDINGS: The lower trachea, main sujata, left main stem, KARTHIK, lingula, LLL, right main stem, RUL, bronchus intermedius, RML and RLL appeared normal. Left lower lung zone with no endobronchial lesions, left upper and lingula both appear normal Right upper, right middle and right lower lobes all appeared normal with no endobronchial lesions Endotracheal tube was retracted 3 cm under direct visualization with the bronchoscope Bronchoalveolar lavage was performed x1 in the right lateral segment (RB4) with 60 mL of saline instilled and a total return of 40 mL; the fluid appeared cloudy SPECIMENS: ID Type Source Tests Collected by Time Destination 1 :  Lavage Lung, Right Middle Lobe Bronchoalveolar Lavage PULMONARY CYTOLOGY Di Meier MD 9/14/2023 12:55 PM  A :  Bronchial Lung, Right Middle Lobe Bronchoalveolar Lavage FUNGAL CULTURE, VIRUS CULTURE, BRONCHIAL CULTURE AND GRAM STAIN, LEGIONELLA CULTURE, PNEUMOCYSTIS SMEAR BY DFA ([facility]), AFB CULTURE WITH STAIN Di Meier MD 9/14/2023 12:57 PM       Impression: BAL of the right middle lobe Endotracheal tube was retracted under direct visualization Tongue still appears swollen, vocal cords visualized outside of the endotracheal tube with the bronchoscope, no significant edema or mass noted RECOMMENDATION:  Follow-up infectious work-up XR chest 1 view portable    Result Date: 9/13/2023  Narrative: CHEST INDICATION:   post intubation. COMPARISON: Same day chest radiograph and subsequent same day CT chest. Chest x-ray 8/27/2023. EXAM PERFORMED/VIEWS:  XR CHEST PORTABLE FINDINGS: Endotracheal tube terminates approximately 4 cm above the sujata. Heart shadow is enlarged but unchanged from prior exam. Bibasilar airspace opacities are again demonstrated. No appreciable pneumothorax. No evidence of acute osseous abnormality. Lucent lesion within T12 is better demonstrated on same-day CT. Impression: 1. Endotracheal tube terminates 4 cm above the sujata. 2. Redemonstration of bibasilar airspace opacities. Workstation performed: [id number]     XR chest 1 view portable    Result Date: 9/13/2023  Narrative: CHEST INDICATION:   post intubation, adjusting ET Tube. COMPARISON:  None EXAM PERFORMED/VIEWS:  XR CHEST PORTABLE FINDINGS: Endotracheal tube terminates approximately 3 cm above the sujata. Cardiomediastinal silhouette appears unremarkable. Bibasilar airspace opacities are again noted. No appreciable pneumothorax or pleural effusion. No evidence of acute osseous abnormality. Lucent lesion within T12 is better demonstrated on same-day CT. Impression: 1. Endotracheal tube terminates 3.3 cm above the sujata. 2. Bibasilar airspace opacities are again demonstrated. Workstation performed: [id number]     XR chest portable    Result Date: 9/13/2023  Narrative: CHEST INDICATION:   sob. COMPARISON: 8/27/2023. Subsequent CT chest performed the same day. EXAM PERFORMED/VIEWS:  XR CHEST PORTABLE FINDINGS: Heart shadow is enlarged but unchanged from prior exam. Hazy opacities are noted within the right lung base, possibly atelectasis versus pneumonia. Left basilar opacity is similar to prior radiograph. No appreciable pneumothorax. No evidence of acute osseous abnormality. Cystic lesion within the T12 vertebral body is better characterized on same-day CT. Impression: 1. Hazy bibasilar airspace opacities which may represent atelectasis versus pneumonia. Left basilar opacity is similar to recent radiograph while right basilar opacity is new Workstation performed: [id number]     CTA ED chest PE Study    Result Date: 9/13/2023  Narrative: CTA - CHEST WITH IV CONTRAST - PULMONARY ANGIOGRAM INDICATION:   R/O PE. Reports shortness of breath since being discharged from the hospital 3 weeks ago for pneumonia. Fatigue. Productive cough with white sputum. Angioedema. COMPARISON: Chest radiograph 9/13/2023, chest CT 8/25/2023, thoracic spine CT 11/13/2013. TECHNIQUE: CT angiogram timed for optimal opacification of the pulmonary arteries. Axial, sagittal, and coronal 2D reformats created from source data. Coronal 3D MIP postprocessing on the acquisition scanner. Radiation dose length product (DLP):  472 mGy-cm . Radiation dose exposure minimized using iterative reconstruction and automated exposure control. IV Contrast:  85 mL of iohexol (OMNIPAQUE) FINDINGS: PULMONARY ARTERIES:  No pulmonary embolus. Moderate pulmonary artery enlargement. LUNGS: Mild patchy new consolidation in the medial segment right middle lobe. Improving opacity in the left upper lobe with mild residual atelectasis/scar. Redemonstration of mild dependent atelectasis in both lower lobes. Severe emphysema. AIRWAYS: No significant filling defects. ET tube 4 cm above the sujata. PLEURA:  Unremarkable. HEART/GREAT VESSELS: Moderate cardiomegaly. MEDIASTINUM AND PRASANTH: Slight regression of hilar and mediastinal lymphadenopathy. The largest node was previously 2.5 x 2.0 cm in the right infrahilar region and is now 1.9 x 1.3 cm (501/94). CHEST WALL AND LOWER NECK: Unremarkable. UPPER ABDOMEN: Right renal cysts.  OSSEOUS STRUCTURES: Redemonstration of the large cystic lesion with sclerotic margins in T12 with destruction of the superior and inferior endplates, present as a much smaller thin-walled cystic lesion on the thoracic spine CT from 2013, imaged on a thoracic spine MRI from 2020. Impression: No pulmonary embolus. Patchy consolidation right middle lobe, new from 8/25/2023, compatible with pneumonia. Slight regression of previous hilar and mediastinal lymphadenopathy. Improving left upper lobe opacity which may have been due to pneumonia with residual atelectasis/scar. Persistent partial atelectasis of the lower lobes. 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 Workstation performed: [id number]     Echo complete w/ contrast if indicated    Result Date: 8/28/2023  Narrative: •  Left Ventricle: Left ventricular cavity size is small. Wall thickness is increased. There is severe concentric hypertrophy. The left ventricular ejection fraction is 60%. Systolic function is normal. Wall motion is normal. Diastolic function is mildly abnormal, consistent with grade I (abnormal) relaxation. There is no LV dynamic obstruction at rest. •  Right Ventricle: Right ventricular cavity size is normal. Systolic function is normal. •  Left Atrium: The atrium is mildly dilated. •  Mitral Valve: There is mild to moderate regurgitation. There is systolic anterior motion of the chordal apparatus with late peaking gradient 29 mmHg during Valsalva maneuver. •  Pericardium: There is a trivial pericardial effusion along the right atrial free wall. XR chest portable ICU    Result Date: 8/28/2023  Narrative: CHEST INDICATION:   Acute hypoxic respiratory failure. COMPARISON: 8/25/2023 EXAM PERFORMED/VIEWS:  XR CHEST PORTABLE ICU FINDINGS: Heart shadow is enlarged but unchanged from prior exam. There is stable left-sided volume loss secondary to left basilar atelectasis. No pneumothorax or pleural effusion. Osseous structures appear within normal limits for patient age. Impression: Stable volume loss on the left secondary to left lower lobe atelectasis. Workstation performed: [id number]     VAS lower limb venous duplex study, complete bilateral    Result Date: 8/27/2023  Narrative:  [facility] REPORT CLINICAL: Indications: Patient presents with bilateral lower extremity pain x 1 days. Operative History: cardiac surgery-date unknown. Risk Factors The patient has history of HTN and CKD. She has no history of Hyperlipidemia. CONCLUSION:  Impression: RIGHT LOWER LIMB: No evidence of acute or chronic deep vein thrombosis. No evidence of superficial thrombophlebitis noted. Doppler evaluation shows a normal response to augmentation maneuvers. . Popliteal, posterior tibial and anterior tibial arterial Doppler waveform's are triphasic. LEFT LOWER LIMB: No evidence of acute or chronic deep vein thrombosis. No evidence of superficial thrombophlebitis noted. Doppler evaluation shows a normal response to augmentation maneuvers. Popliteal, posterior tibial and anterior tibial arterial Doppler waveform's are triphasic. SIGNATURE: Electronically Signed by: Donna Morton on 2023-08-27 08:12:52 PM    CTA ED chest PE Study    Result Date: 8/25/2023  Narrative: CTA - CHEST WITH IV CONTRAST - PULMONARY ANGIOGRAM INDICATION:   Increased SOB, recent COVID diagnosis. COMPARISON: MRI from 3/18/2020 as well as bone scan from 7/2/2019 and thoracic spine from 11/13/2013 TECHNIQUE: CTA examination of the chest was performed using angiographic technique according to a protocol specifically tailored to evaluate for pulmonary embolism. Multiplanar 2D reformatted images were created from the source data. In addition, coronal 3D MIP postprocessing was performed on the acquisition scanner. The study is limited by some respiratory motion artifacts especially in the lower lobes on the left where there is crowding of vessels due to atelectatic changes. Radiation dose length product (DLP) for this visit:  370 mGy-cm .   This examination, like all CT scans performed in the [facility]. [address], was performed utilizing techniques to minimize radiation dose exposure, including the use of iterative reconstruction and automated exposure control. IV Contrast:  80 mL of iohexol (OMNIPAQUE) FINDINGS: PULMONARY ARTERIAL TREE: Limited visualization left lower lobe however there is suspicion for a small embolus in the posterobasal segment on axial image 139, series 305 and coronal image 142. No definite filling defect is seen elsewhere. The main pulmonary artery is significantly dilated at 4.2 cm. The RV LV ratio is elevated at 1.1 cm. The primary pulmonary vascular stent minutes are also dilated chronicity is uncertain. LUNGS: Emphysema is noted with blebs at the apices. There is peripheral consolidation in the left upper lobe. Air bronchogram is not well seen and there is mucous occlusion of the left mainstem bronchus with volume loss of the left upper lobe as well as  left lower lobe to a lesser extent. Some aeration in the left lower lobe is still visible. PLEURA:  Unremarkable. HEART/GREAT VESSELS:  Unremarkable for patient's age. No thoracic aortic aneurysm. MEDIASTINUM AND PRASANTH: 10 mm pretracheal node is identified. 9 mm superior mediastinal node is identified. 10 mm prevascular node is identified. Subcarinal node measures 1.5 cm. Hilar adenopathy is also demonstrated. Right hilar node is larger measuring 1.8 cm in short axis on image 138. CHEST WALL AND LOWER NECK:   Unremarkable. VISUALIZED STRUCTURES IN THE UPPER ABDOMEN: Low-density cyst is seen in the right kidney. . OSSEOUS STRUCTURES: There appears to be a destructive lesion involving the T12 vertebral body eroding both endplates and much of the vertebral body this does not appear to represent a Schmorl's node. A cystic lesion was noted in 2013 at this location however the current lesion is larger with loss of endplates. MRI also imaged this lesion in 2020 the lesion appears somewhat larger on the current examination however. Impression: Limited study. There is equivocal embolus in the posterior basal segment left lower lobe. Other smaller segments are difficult to assess due to motion and vascular crowding. There is mucous plugging in the left mainstem bronchus with volume loss in portions of the left lower lobe and left upper lobe. Aspiration pneumonia is not excluded. There is dilatation of the main pulmonary artery and proximal pulmonary segment suggesting pulmonary hypertension this is more likely chronic than acute given the degree of distention. Emphysema is present. There is significant mediastinal and hilar adenopathy suggesting underlying neoplasm more likely than simple reactive nodes. Enlarging lesion at T12 is again demonstrated of uncertain etiology. This has been present since 2013. Perhaps a plasmacytoma is a consideration. Neoplastic work-up is advised. Pulmonology consultation is also advised to assess endobronchial obstruction on the left. This was discussed with resident physician Dr. Ann Marie Gayle at 4:58 p.m. Follow-up was marked in the epic system Workstation performed: [id number]     XR chest 1 view portable    Result Date: 8/25/2023  Narrative: CHEST INDICATION:   SOB. COMPARISON: 8/21/2021 EXAM PERFORMED/VIEWS:  XR CHEST PORTABLE Single view FINDINGS: Development of CHF. Probable left basal infiltrate. Cardiomediastinal silhouette has become more enlarged. No pneumothorax or pleural effusion. Osseous structures appear within normal limits for patient age. Impression: Increased cardiomegaly. Development of CHF. Probable left basal infiltrate Workstation performed: [id number]       I have personally reviewed labs, imaging studies, and pertinent reports. This note has been generated by voice recognition software system. Therefore, there may be spelling, grammar, and or syntax errors. Please contact if questions arise.      Violeta Marrero, DO   Hematology and Medical Oncology - PGY IV  [facility]

## 2023-09-26 NOTE — ASSESSMENT & PLAN NOTE
• POA with acute respiratory failure, SOB. • [facility]: Swollen tongue, swelling soft and hard palate and almost the head of all phalanx is completely closed. Severe angioedema. • Decadron 10 mg, Benadryl 25 mg given. • Initially refused but eventually agreed with intubation. • Prior episode of angioedema in 2020 noted. Suspected coadministration of increase the dose of tramadol and lisinopril. • Per daughter, there is no recent change in medications except Trelegy inhaler, cefdinir. 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       Plan:  • Trach placed 9/17  • Tolerating trach, No issue

## 2023-09-26 NOTE — ASSESSMENT & PLAN NOTE
Wt Readings from Last 3 Encounters:   09/26/23 81.1 kg (178 lb 12.7 oz)   09/07/23 74.6 kg (164 lb 6.4 oz)   08/30/23 73.3 kg (161 lb 9.6 oz)     Lab Results   Component Value Date    LVEF 60 08/28/2023     (H) 09/13/2023     (H) 08/25/2023     • Volume status: Euvolemic. • POA physical (limited): JVD-, HJR-, B/L LE trace to 1+ edema. • Echo 8/28/23: LVEF 60%. D1DD. • CXR 9/22: Persistent pulmonary venous congestion with small effusions and bibasilar atelectasis.  IV lasix given with even I/O.       Plan:  • CMP, magnesium; Goal Mg > 2 and K > 4; Replete prn  • HOB > 30°, Daily standing weights, Measure I/O  • Will continue holding diuresis

## 2023-09-26 NOTE — SPEECH THERAPY NOTE
Speech-Language Pathology Bedside Swallow Evaluation        Patient Name: Burke Gonzalez    UERST'V Date: 9/26/2023     Problem List  Principal Problem:    Acute respiratory failure with hypoxia secondary to oropharyngeal mass  Active Problems:    Benign essential hypertension    Hyperlipidemia    Pain syndrome, chronic    CKD (chronic kidney disease) stage 3, GFR 30-59 ml/min (HCC)    (HFpEF) heart failure with preserved ejection fraction (HCC)    Ambulatory dysfunction    Angioedema    Oropharyngeal mass    Blister (nonthermal) of left forearm, sequela    Large cell lymphoma (HCC)    Chemotherapy induced neutropenia (HCC)    Chronic Superficial thrombophlebitis           Summary    Pt presents with silent aspiration  Noted w/ puree (kiara pudding); suspect aspiration risk due to trach, nasopharyngeal mass, NGT presence; would recommend proceed w/ PEG and will cont po trials/ swallow assessment including VBS after placement and NGT removed. Recommendations:   Diet: NPO   Meds: non-oral if possible   Frequent Oral care: 4x/day  Aspiration precautions  Other Recommendations/ considerations: will cont to follow for ongoing assessment  And po trials as appropriate       Current Medical Status  Pt is a 69 y.o. female who presented to [address] [place] [address]  with acute resp failure /w hypoxia 2* oropharyngeal mass. See initial bedside swallow eval 9/20 for further info. Pt had trach change today, downsize to #7 shiley cuffless XLT    Past medical history:   Please see H&P for details    Special Studies:  CXR: 9/25/23 Slightly increased mild vascular congestion   Bilateral pleural effusions   Increased left base atelectasis and/or infiltrate.     Social/Education/Vocational Hx:  Pt lives with family    Swallow Information   Current Risks for Dysphagia & Aspiration: nasopharyngeal mass/ trach   Current Symptoms/Concerns:  trach; nasopharyngeal mass  Current Diet: NPO with tube feeds   Baseline Diet: regular diet and thin liquids      Baseline Assessment   Behavior/Cognition: alert  Speech/Language Status: able to participate in basic conversation and able to follow commands  W/ PMV in place  Patient Positioning: upright in chair     Swallow Mechanism Exam   Facial: symmetrical  Labial: WFL  Lingual: decreased ROM  Velum: unable to visualize  Mandible: adequate ROM  Dentition: edentulous  Vocal quality:clear/adequate   Volitional Cough: strong/productive   Respiratory: Trach collar w/ PMV in place- O2 sats 95%    Consistencies Assessed and Performance   Consistencies Administered: 2 tsp blue dyed applesauce, 2 tsps of kiara pudding, and 2 tsps of blue dyed NTL    Oral Stage: pt self fed tsp amounts of puree and NTL. Slow but functional manipulation and transfer. Pharyngeal Stage: swallow initiation appeared timely and palpated to be complete. No coughing or increased congestion noted. Voice remained clear. However, on volitional coughing, small amt of kiara pudding residue noted in tracheal secretions. No blue dyed po residue noted. Will have nsg watch secretions for delayed response. Esophageal Concerns: none reported      Results Reviewed with: patient, RN, MD and RANDEE   Dysphagia Goals: pt will participate in repeat swallow eval to determine safety of po intake and/or further instrumental testing.       Alisha Andrea MA CCC-SLP  Speech Pathologist  [place] license # [id number]  [place] license # [id number]  Available via [employer]

## 2023-09-26 NOTE — ASSESSMENT & PLAN NOTE
• POA with O2 saturate around 80% on room air. Needed high flow 60 L to bring up her O2 saturation to 95%. • Recent hospitalization for respiratory failure second to pneumonia. • CTA PE study negative for PE. New onset RML PNA. • Persistent partial atelectasis of the lower lobes noted. • Current daily smoker.       Plan:  • Tracheostomy 9/17. In place . • Completed Decadron. • Atrovent/Xopenex  • Airway clearance protocol.  IS.  • Continue 3% saline nebs

## 2023-09-26 NOTE — PROGRESS NOTES
Critical Care Interval Transfer Note:    Please refer to progress note from earlier today for full details. Barriers to discharge:   · Needs continuous monitoring     Consults: IP CONSULT TO CASE MANAGEMENT  IP CONSULT TO ENT  IP CONSULT TO ONCOLOGY  IP CONSULT TO PALLIATIVE CARE  INPATIENT CONSULT TO IR    Recommended to review admission imaging for incidental findings and document in discharge navigator: Chart reviewed, no known incidental findings noted at this time. Discharge Plan: Anticipate discharge in >72 hrs to discharge location to be determined pending rehab evaluations. Central access plan: Patient requires PICC secondary to Chemotherapy    PT Recommendations: Post acute rehabilitation services  OT Recommendations: Post acute rehabilitation services      Patient seen and evaluated by Critical Care today and deemed to be appropriate for transfer to Stepdown Level 2. Spoke to Dr. Alex Gaviria from [facility] to accept transfer. Critical care can be contacted via [employer] with any questions or concerns.

## 2023-09-26 NOTE — SPEECH THERAPY NOTE
Speech-Language Pathology   Speaking Valve Evaluation    Patient Name: Katty Maharaj 69 y.o. Today's Date: 9/26/2023      Problem List  Principal Problem:    Acute respiratory failure with hypoxia secondary to oropharyngeal mass  Active Problems:    Benign essential hypertension    Hyperlipidemia    Pain syndrome, chronic    CKD (chronic kidney disease) stage 3, GFR 30-59 ml/min (HCC)    (HFpEF) heart failure with preserved ejection fraction (HCC)    Ambulatory dysfunction    Angioedema    Oropharyngeal mass    Blister (nonthermal) of left forearm, sequela    Large cell lymphoma (HCC)    Chemotherapy induced neutropenia (HCC)    Chronic Superficial thrombophlebitis        Current Medical Status  Pt is a 68 yo female admitted to [address] [place] [address] 9/13/23 for oropharyngeal mass, acute resp failure w/ hyoxia. See bedside swallow eval completed 9/20/23 for further background info. Pt had trach change today to #7 shiley XLT cuffless. Pt now able to produce some voicing around trach. Consult for speaking valve evaluation received and completed bedside. PMH  See EMR for details    Imaging Studies:  CXR" 9/25/23 slightly increased mild vascular congestion. Bilateral pleural effusions      Baseline Status: :   Behavior/Cognition: alert  Speech/Language Status: able to participate in basic conversation and able to follow commands  Patient Positioning: upright in chair  Pain:  0  Appears comfortable on trach collar and no report or indication of pain.      Baseline Oral-Mechanism and Motor Evaluation     Oral Mechanism Exam   Facial: symmetrical  Labial: WFL  Lingual: left sided tongue deviation and decreased ROM  Velum: unable to visualize  Mandible: adequate ROM  Dentition: edentulous  Tracheostomy: Shiley #7 cuffless XLT  Vital Signs at Baseline: SpO2 95, RR 16, HR 91    Assessment with brief digital occlusion:  voicing achieved (+airflow around tracheostomy tube and through glottis)      Speaking Valve Trials and Education  Speaking Valve was provided to pt. Pt tolerated initial 5-second application trial without discomfort and with clear voicing. Removal yielded NO back pressure. VSS  Pt then tolerated additional application for a total of 20+ minutes. Respiratory support & volume: Adequate  Able produce adequate phonation to answer questions and engage in short conversation w/  and her dtr via phone. Vocal quality: clear  Vital Signs (O2, RR, HR): stable throughout   Pt denied discomfort or increased WOB. No increased WOB observed by SLP. Hands-on instruction was provided to the pt  for safe application and removal of the speaking valve. Pt  able to demonstrate application and removal of the valve with min verbal/tactile cues. Education provided to pt re: care and storage of valve, safe usage of valve, and indications for removal of valve (including removal while pt is asleep). Pt  verbally indicated understanding. Summary   Pt tolerated application and usage of speaking valve for 20+ minutes while alert and upright on trach collar. Pt denied discomfort or increased WOB and demonstrated clear and comfortable voicing. Education provided to pt  re: usage and care of valve. Speech Therapy Prognosis   Prognosis:good    Prognosis Considerations: motivation, age, resp status, medical diagnosis/prognosis      Recommendations  Continue with [place] valve trials  as tolerated throughout the day. If pt experiences overall decline in respiratory status, or discomfort with valve use, remove valve and contact Speech Therapy. ST will continue to follow for ongoing assessment and for reinforcement of education.        Alisha Lucio MA CCC-SLP  Speech Pathologist  [place] license # [id number]  [place] license # [id number]  Available via [employer]

## 2023-09-26 NOTE — PROGRESS NOTES
Critical Care Attending Note; Larisa Mccollum   Note Date: 23  Note Time: 11:19 AM    /Patient: Augustina Bird  Age, : 69 y.o., 1955 MRN: [id number] Code Status: Level 1 - Full Code Patient Location: ICU 03/ICU [address] [place] [address] LOS:13 days  ]   Patient seen and examined, medical record reviewed, discussed with house staff and nursing staff. HPI   CC: BCell Lymphoma  67F with a PMH of HFpEF, CKD stage III, HTN, HLD admitted for pharengeal edema requiring intubation found to have diagnosis of B cell lymphoma. Key Recent Events   : Admitted, Intubated  : Wili Davidson  : R-EPOCH cycle 1 day 1    Main ICU Plans:       #Neuro  Chronic Pain  - Continue Home regimen     #Card  HTN  - Continue Norvasc, Lisinopril, Metoprolol     HFpEF - POCUS - Euvolemic  - Hold Lasix     #Pulm  Tracheostomy - Transitioned to Cuffless, obturator at bedside   - Tolerating TCT   - Wean FiO2 - Maintain O2 Sat >90%  - Pulmonary toilet regimen - Hypertonic Saline, Acapella, Chest PT   - Vap/Trach care  - ENT consulted appreciate recs  - PT/OT    #GI  Dysphagia - Aspirating  - Possible PEG with IR  - Repeat swallow    #Heme  B - Cell Lymphoma  - Oncology Consulted - R-EPOCH  - TLS Monitoring daily  - Allopurinol   - Continue Prophylaxis - Acyclovir, Fluc, Levofloxacin, Bactrim    #DVT/GI ppx  Lovenox    #Lines/Tubes/Drains:   Peripheral IV 23 Right;Ventral (anterior) Forearm (Active)   Number of days: 4       PICC Line 64/10/06 Right Basilic (Active)   Number of days: 4       NG/OG/Enteral Tube Nasogastric 16 Fr Right nare (Active)   Number of days: 3       #Nutrition:   Diet Enteral/Parenteral; Tube Feeding No Oral Diet; Vital AF 1.2; Continuous; 40; 100;  Water; Every 6 hours        #Code Status:   Level 1 - Full Code    #Dispo:   Gunner Reasons MD  Pulmonary, Critical Care    Critical care time, excluding procedures, teaching, family meetings, and excludes any prior time recorded by the AP/resident, 35 minutes. Upon my evaluation, this patient has a high probability of imminent or life-threatening deterioration due to above problems which required my direct attention, intervention, and personal management. Impression/Active Problems:    B Cell lymphoma   Tracheostomy   Respiratory Failure   Secretions   HTN    TLS    Dysphagia   Chronic pain       Physical Exam:     Vital Signs:   Weight: 81.1 kg (178 lb 12.7 oz)  IBW: Ideal body weight: 47.8 kg (105 lb 6.1 oz)  Adjusted ideal body weight: 61.1 kg (134 lb 11.9 oz)  Temp:  [32 °F (0 °C)-99 °F (37.2 °C)] 98.9 °F (37.2 °C)  HR:  [77-92] 77  Resp:  [10-32] 10  BP: (129-233)/() 171/83  FiO2 (%):  [30-60] 50  General: NAD  Neuro: AxO 3  Heart: RRR  Lungs: Basilar crackles  Abdomen: Soft NT  Extremities: No edema                 Ventilator Settings:    FiO2 (%):  [30-60] 50          Invalid input(s): "PCO2", "O2"  Radiologic Images Reviewed:    CXR 9/22  Concerning for Persistent pulmonary venous congestion with small effusions and bibasilar atelectasis.      Input / Output:       Intake/Output Summary (Last 24 hours) at 9/26/2023 1119  Last data filed at 9/26/2023 1001  Gross per 24 hour   Intake 2046 ml   Output 875 ml   Net 1171 ml            Infusions:     Scheduled Medications:  Current Facility-Administered Medications   Medication Dose Route Frequency Provider Last Rate   • acetaminophen  650 mg Oral Q6H PRN Myriam Mcelroy MD     • acyclovir  400 mg Oral BID Ceci Rosario MD     • allopurinol  300 mg Oral Daily Ceci Rosario MD     • alteplase  2 mg Intracatheter Q1MIN PRN Ceci Rosario MD     • amLODIPine  10 mg Oral Daily Wally Paredes DO     • busPIRone  30 mg Oral TID Bishnu Agrawal MD     • chlorhexidine  15 mL Mouth/Throat Q12H [address] Bishnu Agrawal MD     • cyclophosphamide (CYTOXAN) 1,350 mg in sodium chloride 0.9 % 250 mL IVPB  750 mg/m2 (Treatment Plan Recorded) Intravenous Once Ceci Rosario MD     • enoxaparin  40 mg Subcutaneous Q24H [facility] Enio German MD     • famotidine  20 mg Oral BID Corrie Bragg DO     • fentanyl citrate (PF)  50 mcg Intravenous Q2H PRN Enio German MD     • fluconazole  400 mg Oral Daily Elsa Causey MD     • gabapentin  300 mg Oral TID Enio German MD     • guaiFENesin  200 mg Oral Q6H RANDEE Sanchez     • hydrALAZINE  10 mg Intravenous Q4H PRN Lawrence Palomino MD     • levalbuterol  1.25 mg Nebulization Q6H Enio German MD      And   • ipratropium  0.5 mg Nebulization Q6H Enio German MD     • levalbuterol  1.25 mg Nebulization Q8H PRN Enio German MD     • levofloxacin  500 mg Oral Q24H [facility] Elsa Causey MD     • lisinopril  30 mg Oral Daily RANDEE Aragon     • metoprolol tartrate  25 mg Oral Q12H [facility] Martín RANDEE Fuentes     • nicotine  21 mg Transdermal Daily Enio German MD     • nystatin  500,000 Units Swish & Swallow 4x Daily Enio German MD     • ondansetron  4 mg Intravenous Q8H PRN Elsa Causey MD     • oxyCODONE  2.5 mg Oral Q4H PRN RANDEE Gu      Or   • oxyCODONE  5 mg Oral Q4H PRN RANDEE Gu     • oxyCODONE  5 mg Oral Q8H Lawrence Palomino MD     • polyethylene glycol  17 g Oral Daily PRN Enio German MD     • predniSONE  60 mg/m2 (Treatment Plan Recorded) Oral BID With Meals Elsa Causey MD     • senna  8.8 mg Per NG Tube HS Lawrence Palomino MD     • sertraline  50 mg Oral Daily Enio German MD     • sodium chloride  20 mL/hr Intravenous Daily PRN Elsa Causey MD 20 mL/hr (09/25/23 0941)   • sodium chloride  4 mL Nebulization Q6H RANDEE Ellis     • sulfamethoxazole-trimethoprim  1 tablet Oral Once per day on Mon Wed Fri Elsa Causey MD         PRN Medications:  •  acetaminophen  •  alteplase  •  fentanyl citrate (PF)  •  hydrALAZINE  •  levalbuterol  •  ondansetron  •  oxyCODONE **OR** oxyCODONE  •  [COMPLETED] polyethylene glycol **FOLLOWED BY** polyethylene glycol  •  sodium chloride    Labs Reviewed:  Results from last 7 days   Lab Units 09/26/23  0504 09/25/23  0539 09/24/23  0500   WBC Thousand/uL 8.67 11.23* 13.95*   HEMOGLOBIN g/dL 11.7 11.2* 11.0*   HEMATOCRIT % 36.4 34.7* 34.2*   PLATELETS Thousands/uL 219 204 221      Results from last 7 days   Lab Units 09/26/23  0504 09/25/23  0539 09/24/23  0500   SODIUM mmol/L 137 139 140   CO2 mmol/L 31 30 29   BUN mg/dL 27* 28* 28*   CALCIUM mg/dL 9.6 8.8 8.8   MAGNESIUM mg/dL 2.0 1.9 2.0   PHOSPHORUS mg/dL 2.3 2.2* 2.1*         Invalid input(s): "ASTSGOT", "ALTSGPT"LABRCNTIP@ ,alkphos:3,tbilirubin:3,dbilirubin:3)@      Invalid input(s): "TROPT", "CPK", "PBNP"             I have personally seen and examined the patient on (09/26/23 between 2705-0540). I discussed the patient with the AP/resident including, but not limited to, verifying findings; reviewing labs and x-rays; discussing with consultants; developing the plan of care with the bedside nurse; and discussing treatment plan with patient or surrogate. I have reviewed the note and assessment performed by the AP/resident and agree with the AP/resident’s documented findings and plan of care with the above additions/exceptions. Please see my comments for details and adjustments.

## 2023-09-26 NOTE — PLAN OF CARE
Problem: MOBILITY - ADULT  Goal: Maintain or return to baseline ADL function  Description: INTERVENTIONS:  -  Assess patient's ability to carry out ADLs; assess patient's baseline for ADL function and identify physical deficits which impact ability to perform ADLs (bathing, care of mouth/teeth, toileting, grooming, dressing, etc.)  - Assess/evaluate cause of self-care deficits   - Assess range of motion  - Assess patient's mobility; develop plan if impaired  - Assess patient's need for assistive devices and provide as appropriate  - Encourage maximum independence but intervene and supervise when necessary  - Involve family in performance of ADLs  - Assess for home care needs following discharge   - Consider OT consult to assist with ADL evaluation and planning for discharge  - Provide patient education as appropriate  Outcome: Progressing  Goal: Maintains/Returns to pre admission functional level  Description: INTERVENTIONS:  - Perform BMAT or MOVE assessment daily.   - Set and communicate daily mobility goal to care team and patient/family/caregiver. - Collaborate with rehabilitation services on mobility goals if consulted  - Perform Range of Motion  times a day. - Reposition patient every  hours.   - Dangle patient  times a day  - Stand patient times a day  - Ambulate patient  times a day  - Out of bed to chair  times a day   - Out of bed for meals  times a day  - Out of bed for toileting  - Record patient progress and toleration of activity level   Outcome: Progressing     Problem: Prexisting or High Potential for Compromised Skin Integrity  Goal: Skin integrity is maintained or improved  Description: INTERVENTIONS:  - Identify patients at risk for skin breakdown  - Assess and monitor skin integrity  - Assess and monitor nutrition and hydration status  - Monitor labs   - Assess for incontinence   - Turn and reposition patient  - Assist with mobility/ambulation  - Relieve pressure over bony prominences  - Avoid friction and shearing  - Provide appropriate hygiene as needed including keeping skin clean and dry  - Evaluate need for skin moisturizer/barrier cream  - Collaborate with interdisciplinary team   - Patient/family teaching  - Consider wound care consult   Outcome: Progressing     Problem: Nutrition/Hydration-ADULT  Goal: Nutrient/Hydration intake appropriate for improving, restoring or maintaining nutritional needs  Description: Monitor and assess patient's nutrition/hydration status for malnutrition. Collaborate with interdisciplinary team and initiate plan and interventions as ordered. Monitor patient's weight and dietary intake as ordered or per policy. Utilize nutrition screening tool and intervene as necessary. Determine patient's food preferences and provide high-protein, high-caloric foods as appropriate.      INTERVENTIONS:  - Monitor oral intake, urinary output, labs, and treatment plans  - Assess nutrition and hydration status and recommend course of action  - Evaluate amount of meals eaten  - Assist patient with eating if necessary   - Allow adequate time for meals  - Recommend/ encourage appropriate diets, oral nutritional supplements, and vitamin/mineral supplements  - Order, calculate, and assess calorie counts as needed  - Recommend, monitor, and adjust tube feedings and TPN/PPN based on assessed needs  - Assess need for intravenous fluids  - Provide specific nutrition/hydration education as appropriate  - Include patient/family/caregiver in decisions related to nutrition  Outcome: Progressing

## 2023-09-26 NOTE — PROGRESS NOTES
[address] [facility]  Progress Note  Name: Renaldo Ba  MRN: [id number]  Unit/Bed#: ICU 03 I Date of Admission: 9/13/2023   Date of Service: 9/26/2023 I Hospital Day: 13    Assessment/Plan   Large cell lymphoma ([address])  Assessment & Plan  · Biopsy on 9/17: Large B-cell lymphoma, germinal center B-cell type, c-MYC and BCL-2 double expression. Ki-67 proliferation index is up to 90%; FISH & NGS pending. · Staging work-up: Currently stage II. · First inpatient chemotherapy 9/22/2023- with R-EPOCH. Plan:  · Inpatient hematology oncology following. Recommendation appreciated. · Outpatient follow-up with hematology oncology. · Close TLS workup  · PEG tube per ENT    Oropharyngeal mass  Assessment & Plan  · 9/14 Neck/ soft tissue CT: Large enhancing soft tissue mass identified within the left neck involving multiple spaces. This appears to be centered within the left oropharynx with extensions as described above into multiple spaces. This results in significant airway compromise. 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 Small level 3 and level 4 nodes are seen within the neck. · Tracheostomy by ENT, bx & addition testing performed  · Bx: Positive for malignancy, favor poorly differentiated carcinoma. Cannot entirely exclude a high grade large cell lymphoma. Portion of specimen submitted for flow cytometry. · H/o tobacco abuse, daily smoker. · Daughter was updated at bedside. · Preliminary biopsy: Large B-cell lymphoma, germinal center B-cell type, c-MYC and BCL-2 double expression. · First chemotherapy on 9/22. Plan:  · ENT/ Med onc following, appreciated  · Follow final biopsy report  · Off vent for 48hrs      * Acute respiratory failure with hypoxia secondary to oropharyngeal mass  Assessment & Plan  • POA with O2 saturate around 80% on room air. Needed high flow 60 L to bring up her O2 saturation to 95%.   • Recent hospitalization for respiratory failure second to pneumonia. • CTA PE study negative for PE. New onset RML PNA. • Persistent partial atelectasis of the lower lobes noted. • Current daily smoker.       Plan:  • Tracheostomy 9/17. In place . • Completed Decadron. • Atrovent/Xopenex  • Airway clearance protocol. IS.  • Continue 3% saline nebs     (HFpEF) heart failure with preserved ejection fraction [facility])  Assessment & Plan  Wt Readings from Last 3 Encounters:   09/26/23 81.1 kg (178 lb 12.7 oz)   09/07/23 74.6 kg (164 lb 6.4 oz)   08/30/23 73.3 kg (161 lb 9.6 oz)     Lab Results   Component Value Date    LVEF 60 08/28/2023     (H) 09/13/2023     (H) 08/25/2023     • Volume status: Euvolemic. • POA physical (limited): JVD-, HJR-, B/L LE trace to 1+ edema. • Echo 8/28/23: LVEF 60%. D1DD. • CXR 9/22: Persistent pulmonary venous congestion with small effusions and bibasilar atelectasis. IV lasix given with even I/O.       Plan:  • CMP, magnesium; Goal Mg > 2 and K > 4; Replete prn  • HOB > 30°, Daily standing weights, Measure I/O  • Will continue holding diuresis        Angioedema  Assessment & Plan  • POA with acute respiratory failure, SOB. • On physical in [place] ([place] ED: Swollen tongue, swelling soft and hard palate and almost the head of all phalanx is completely closed. Severe angioedema. • Decadron 10 mg, Benadryl 25 mg given. • Initially refused but eventually agreed with intubation. • Prior episode of angioedema in 2020 noted. Suspected coadministration of increase the dose of tramadol and lisinopril. • Per daughter, there is no recent change in medications except Trelegy inhaler, cefdinir. 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 Plan:  • Trach placed 9/17  • Tolerating trach, No issue    Ambulatory dysfunction  Assessment & Plan  PT/ OT eval before discharge. Pain syndrome, chronic  Assessment & Plan  · Home regimen: Oxycodone 5 mg twice daily as needed. Tylenol 650 mg every 8 hours as needed.   Gabapentin 600 mg 3 times daily. Medical marijuana. · Per daughter, patient was overusing Tylenol over-the-counter. · Pain mostly from lumbar radiculopathy. · Impaired ambulatory dysfunction second to pain. Plan:  · Continue gabapentin 300 3 times daily, scheduled oxycodone 5 mg 3 times daily, as needed Tylenol 650 mg every 6 hours. As needed fentanyl 50 mcg every 2 hours for severe pain. Hyperlipidemia  Assessment & Plan  Lab Results   Component Value Date    CHOLESTEROL 203 (H) 01/24/2023    CHOLESTEROL 177 08/11/2022    CHOLESTEROL 184 07/08/2020     Lab Results   Component Value Date    HDL 45 (L) 01/24/2023    HDL 37 (L) 08/11/2022    HDL 44 (L) 07/08/2020     Lab Results   Component Value Date    TRIG 166 (H) 09/16/2023    TRIG 160 (H) 01/24/2023    TRIG 108 08/11/2022     Lab Results   Component Value Date    NONHDLC 146 07/12/2018    [address] 207 (H) 04/29/2013     Continue outpatient diet/ lifestyle modification. Benign essential hypertension  Assessment & Plan  · Well controlled at home. · Home meds: Amlodipine 10 mg daily, Coreg 25 mg twice daily, lisinopril 10 mg daily. · Elevated BP may second to anxiety from chemotherapy/new diagnosis lymphoma, chronic pain. Plan:  · Discontinue Coreg 2/2 interactions with chemo regimen. · Amlodipine 10 mg daily. Lisinopril 30 mg daily. · PRN hydralazine if BP >160. · Consider increasing PO regimen given persistent hypertension     Chronic Superficial thrombophlebitis  Assessment & Plan  9/23: Patient C/o right forearm soreness. Bilateral upper extremity ultrasound was performed in setting of high risk of DVT. RIGHT UPPER LIMB U/S: No evidence of acute or chronic deep vein thrombosis. 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 Doppler evaluation shows a normal response to augmentation maneuvers.     Chemotherapy induced neutropenia (HCC)  Assessment & Plan  · Monitor CBC   · Monitor for need to transfuse   · Hemoglobin < 7   · Platlets < 10,000     Blister (nonthermal) of left forearm, sequela  Assessment & Plan  · 9/17/23: Blisters of LUE noted, no signs of infection  · Daily wound care, monitor signs of infection     CKD (chronic kidney disease) stage 3, GFR 30-59 ml/min [facility])  Assessment & Plan  Lab Results   Component Value Date    EGFR 89 09/26/2023    EGFR 85 09/25/2023    EGFR 80 09/24/2023    CREATININE 0.68 09/26/2023    CREATININE 0.72 09/25/2023    CREATININE 0.76 09/24/2023     Stable. ICU Core Measures     Vented Patient  VAP Bundle  VAP bundle ordered     A: Assess, Prevent, and Manage Pain · Has pain been assessed? Yes  · Need for changes to pain regimen? No   B: Both Spontaneous Awakening Trials (SATs) and Spontaneous Breathing Trials (SBTs) · Plan to perform spontaneous awakening trial today? Chronic vent   · Plan to perform spontaneous breathing trial today? Chronic vent   · Obvious barriers to extubation? NA   C: Choice of Sedation · RASS Goal: 0 Alert and Calm  · Need for changes to sedation or analgesia regimen? No   D: Delirium · CAM-ICU: Negative   E: Early Mobility  · Plan for early mobility? Yes   F: Family Engagement · Plan for family engagement today? Yes       Antibiotic Review: Patient on appropriate coverage based on culture data. Review of Invasive Devices:      Central access plan: Patient requires PICC secondary to chemotherapy      Prophylaxis:  VTE VTE covered by:  enoxaparin, Subcutaneous, 40 mg at 09/26/23 0815       Stress Ulcer  covered byfamotidine (PEPCID) tablet 20 mg [[id number]]       Subjective   HPI/24hr events: Tolerated trach collar through the night. Did not required ventilator support. Plan for PEG on Wednesday. Review of Systems   Constitutional: Negative for chills and fever. HENT: Negative for ear pain and sore throat. Eyes: Negative for pain and visual disturbance. Respiratory: Negative for cough and shortness of breath.     Cardiovascular: Negative for chest pain and palpitations. Gastrointestinal: Negative for abdominal pain and vomiting. Genitourinary: Negative for dysuria and hematuria. Musculoskeletal: Negative for arthralgias and back pain. Skin: Negative for color change. Neurological: Positive for headaches. Negative for seizures and syncope. All other systems reviewed and are negative. Objective                            Vitals I/O      Most Recent Min/Max in 24hrs   Temp 99 °F (37.2 °C) Temp  Min: 32 °F (0 °C)  Max: 99 °F (37.2 °C)   Pulse 87 Pulse  Min: 77  Max: 105   Resp 19 Resp  Min: 11  Max: 32   /77 BP  Min: 138/60  Max: 233/145   O2 Sat 95 % SpO2  Min: 84 %  Max: 100 %      Intake/Output Summary (Last 24 hours) at 9/26/2023 0827  Last data filed at 9/26/2023 0400  Gross per 24 hour   Intake 1816 ml   Output 500 ml   Net 1316 ml         Diet Enteral/Parenteral; Tube Feeding No Oral Diet; Vital AF 1.2; Continuous; 40; 100; Water; Every 6 hours     Invasive Monitoring Physical exam   None Physical Exam  Eyes:      Extraocular Movements: Extraocular movements intact. Pupils: Pupils are equal, round, and reactive to light. Skin:     General: Skin is warm. Findings: Rash (right fore arm ) present. HENT:      Head: Normocephalic and atraumatic. Right Ear: Tympanic membrane normal.      Left Ear: Tympanic membrane normal.      Mouth/Throat:      Mouth: Mucous membranes are moist.   Neck:     Vascular: No JVD. Trachea: Tracheostomy present. Cardiovascular:      Rate and Rhythm: Normal rate and regular rhythm. Pulses: Normal pulses. Heart sounds: Normal heart sounds. Musculoskeletal:         General: Normal range of motion. Right lower leg: No edema. Left lower leg: No edema. Abdominal:      General: Bowel sounds are normal. There is no distension. Palpations: Abdomen is soft. Tenderness: There is no abdominal tenderness. There is no guarding.    Constitutional:       Appearance: She is well-developed. Pulmonary:      Effort: Pulmonary effort is normal.      Breath sounds: Normal breath sounds. Neurological:      General: No focal deficit present. Mental Status: She is alert and oriented to person, place and time. She is CAM ICU negative. Motor: No motor deficit. Genitourinary/Anorectal:     Comments: Purewick in place. Vitals reviewed.             Diagnostic Studies      EKG: Sinus tach  Imaging:  I have personally reviewed pertinent films in PACS     Medications:  Scheduled PRN   acyclovir, 400 mg, BID  allopurinol, 300 mg, Daily  amLODIPine, 10 mg, Daily  busPIRone, 30 mg, TID  chlorhexidine, 15 mL, Q12H ASHLEY  cyclophosphamide (CYTOXAN) 1,350 mg in sodium chloride 0.9 % 250 mL IVPB, 750 mg/m2 (Treatment Plan Recorded), Once  DOXOrubicin (ADRIAMYCIN) 18 mg, etoposide (TOPOSAR) 90 mg, vinCRIStine (ONCOVIN) 0.7 mg in sodium chloride 0.9 % 500 mL infusion, , Q24H  enoxaparin, 40 mg, Q24H ASHLEY  famotidine, 20 mg, BID  fluconazole, 400 mg, Daily  gabapentin, 300 mg, TID  guaiFENesin, 200 mg, Q6H  levalbuterol, 1.25 mg, Q6H   And  ipratropium, 0.5 mg, Q6H  levofloxacin, 500 mg, Q24H ASHLEY  lisinopril, 30 mg, Daily  metoprolol tartrate, 25 mg, Q12H ASHLEY  nicotine, 21 mg, Daily  nystatin, 500,000 Units, 4x Daily  ondansetron (ZOFRAN) 16 mg, dexamethasone (DECADRON) 10 mg in sodium chloride 0.9 % 50 mL IVPB, , Q24H  oxyCODONE, 5 mg, Q8H  predniSONE, 60 mg/m2 (Treatment Plan Recorded), BID With Meals  senna, 8.8 mg, HS  sertraline, 50 mg, Daily  sodium chloride, 2,000 mL, Once  sodium chloride, 4 mL, Q6H  sulfamethoxazole-trimethoprim, 1 tablet, Once per day on Mon Wed Fri      acetaminophen, 650 mg, Q6H PRN  alteplase, 2 mg, Q1MIN PRN  fentanyl citrate (PF), 50 mcg, Q2H PRN  hydrALAZINE, 10 mg, Q4H PRN  levalbuterol, 1.25 mg, Q8H PRN  ondansetron, 4 mg, Q8H PRN  oxyCODONE, 2.5 mg, Q4H PRN   Or  oxyCODONE, 5 mg, Q4H PRN  polyethylene glycol, 17 g, Daily PRN  sodium chloride, 20 mL/hr, Daily PRN       Continuous          Labs:    CBC    Recent Labs     09/25/23  0539 09/26/23  0504   WBC 11.23* 8.67   HGB 11.2* 11.7   HCT 34.7* 36.4    219     BMP    Recent Labs     09/25/23  0539 09/26/23  0504   SODIUM 139 137   K 3.4* 3.7    99   CO2 30 31   AGAP 3 7   BUN 28* 27*   CREATININE 0.72 0.68   CALCIUM 8.8 9.6       Coags    No recent results     Additional Electrolytes  Recent Labs     09/25/23  0539 09/26/23  0504   MG 1.9 2.0   PHOS 2.2* 2.3          Blood Gas    No recent results  No recent results LFTs  Recent Labs     09/25/23  0539 09/26/23  0504   ALT 17 19   AST 13 14   ALKPHOS 51 52   ALB 2.8* 2.9*   TBILI 0.30 0.38       Infectious  No recent results  Glucose  Recent Labs     09/25/23  0539 09/26/23  0504   GLUC 150* 138               No critical care time spent    [facility], MD

## 2023-09-26 NOTE — ASSESSMENT & PLAN NOTE
Lab Results   Component Value Date    CHOLESTEROL 203 (H) 01/24/2023    CHOLESTEROL 177 08/11/2022    CHOLESTEROL 184 07/08/2020     Lab Results   Component Value Date    HDL 45 (L) 01/24/2023    HDL 37 (L) 08/11/2022    HDL 44 (L) 07/08/2020     Lab Results   Component Value Date    TRIG 166 (H) 09/16/2023    TRIG 160 (H) 01/24/2023    TRIG 108 08/11/2022     Lab Results   Component Value Date    NONHDLC 146 07/12/2018    [address] [facility] 207 (H) 04/29/2013     Continue outpatient diet/ lifestyle modification.

## 2023-09-27 ENCOUNTER — APPOINTMENT (INPATIENT)
Dept: RADIOLOGY | Facility: HOSPITAL | Age: 68
DRG: 004 | End: 2023-09-27
Payer: COMMERCIAL

## 2023-09-27 ENCOUNTER — ANESTHESIA (INPATIENT)
Dept: RADIOLOGY | Facility: HOSPITAL | Age: 68
End: 2023-09-27
Payer: COMMERCIAL

## 2023-09-27 ENCOUNTER — ANESTHESIA EVENT (INPATIENT)
Dept: RADIOLOGY | Facility: HOSPITAL | Age: 68
End: 2023-09-27
Payer: COMMERCIAL

## 2023-09-27 LAB
INR PPP: 0.96 (ref 0.84–1.19)
PROTHROMBIN TIME: 13.4 SECONDS (ref 11.6–14.5)

## 2023-09-27 PROCEDURE — 99232 SBSQ HOSP IP/OBS MODERATE 35: CPT | Performed by: INTERNAL MEDICINE

## 2023-09-27 PROCEDURE — 88364 INSITU HYBRIDIZATION (FISH): CPT | Performed by: PATHOLOGY

## 2023-09-27 PROCEDURE — 94669 MECHANICAL CHEST WALL OSCILL: CPT

## 2023-09-27 PROCEDURE — 88365 INSITU HYBRIDIZATION (FISH): CPT | Performed by: PATHOLOGY

## 2023-09-27 PROCEDURE — 88305 TISSUE EXAM BY PATHOLOGIST: CPT | Performed by: PATHOLOGY

## 2023-09-27 PROCEDURE — 0DH63UZ INSERTION OF FEEDING DEVICE INTO STOMACH, PERCUTANEOUS APPROACH: ICD-10-PCS | Performed by: RADIOLOGY

## 2023-09-27 PROCEDURE — 88360 TUMOR IMMUNOHISTOCHEM/MANUAL: CPT | Performed by: PATHOLOGY

## 2023-09-27 PROCEDURE — BD12YZZ FLUOROSCOPY OF STOMACH USING OTHER CONTRAST: ICD-10-PCS | Performed by: RADIOLOGY

## 2023-09-27 PROCEDURE — 94760 N-INVAS EAR/PLS OXIMETRY 1: CPT

## 2023-09-27 PROCEDURE — 94002 VENT MGMT INPAT INIT DAY: CPT

## 2023-09-27 PROCEDURE — 71045 X-RAY EXAM CHEST 1 VIEW: CPT

## 2023-09-27 PROCEDURE — 88342 IMHCHEM/IMCYTCHM 1ST ANTB: CPT | Performed by: PATHOLOGY

## 2023-09-27 PROCEDURE — 94668 MNPJ CHEST WALL SBSQ: CPT

## 2023-09-27 PROCEDURE — 49440 PLACE GASTROSTOMY TUBE PERC: CPT

## 2023-09-27 PROCEDURE — 88341 IMHCHEM/IMCYTCHM EA ADD ANTB: CPT | Performed by: PATHOLOGY

## 2023-09-27 PROCEDURE — 49440 PLACE GASTROSTOMY TUBE PERC: CPT | Performed by: RADIOLOGY

## 2023-09-27 PROCEDURE — 85610 PROTHROMBIN TIME: CPT | Performed by: INTERNAL MEDICINE

## 2023-09-27 PROCEDURE — C1769 GUIDE WIRE: HCPCS

## 2023-09-27 PROCEDURE — 94640 AIRWAY INHALATION TREATMENT: CPT

## 2023-09-27 RX ORDER — LISINOPRIL 20 MG/1
40 TABLET ORAL DAILY
Status: DISCONTINUED | OUTPATIENT
Start: 2023-09-27 | End: 2023-10-03

## 2023-09-27 RX ORDER — CARVEDILOL 12.5 MG/1
12.5 TABLET ORAL 2 TIMES DAILY WITH MEALS
Status: DISCONTINUED | OUTPATIENT
Start: 2023-09-27 | End: 2023-10-06

## 2023-09-27 RX ORDER — SODIUM CHLORIDE, SODIUM LACTATE, POTASSIUM CHLORIDE, CALCIUM CHLORIDE 600; 310; 30; 20 MG/100ML; MG/100ML; MG/100ML; MG/100ML
INJECTION, SOLUTION INTRAVENOUS CONTINUOUS PRN
Status: DISCONTINUED | OUTPATIENT
Start: 2023-09-27 | End: 2023-09-27

## 2023-09-27 RX ORDER — FENTANYL CITRATE 50 UG/ML
INJECTION, SOLUTION INTRAMUSCULAR; INTRAVENOUS AS NEEDED
Status: DISCONTINUED | OUTPATIENT
Start: 2023-09-27 | End: 2023-09-27

## 2023-09-27 RX ORDER — MIDAZOLAM HYDROCHLORIDE 2 MG/2ML
INJECTION, SOLUTION INTRAMUSCULAR; INTRAVENOUS AS NEEDED
Status: DISCONTINUED | OUTPATIENT
Start: 2023-09-27 | End: 2023-09-27

## 2023-09-27 RX ORDER — PHENYLEPHRINE HCL IN 0.9% NACL 1 MG/10 ML
SYRINGE (ML) INTRAVENOUS AS NEEDED
Status: DISCONTINUED | OUTPATIENT
Start: 2023-09-27 | End: 2023-09-27

## 2023-09-27 RX ORDER — LIDOCAINE WITH 8.4% SOD BICARB 0.9%(10ML)
SYRINGE (ML) INJECTION AS NEEDED
Status: DISCONTINUED | OUTPATIENT
Start: 2023-09-27 | End: 2023-10-04

## 2023-09-27 RX ORDER — PROPOFOL 10 MG/ML
INJECTION, EMULSION INTRAVENOUS CONTINUOUS PRN
Status: DISCONTINUED | OUTPATIENT
Start: 2023-09-27 | End: 2023-09-27

## 2023-09-27 RX ADMIN — BUSPIRONE HYDROCHLORIDE 30 MG: 10 TABLET ORAL at 08:11

## 2023-09-27 RX ADMIN — SODIUM CHLORIDE, SODIUM LACTATE, POTASSIUM CHLORIDE, AND CALCIUM CHLORIDE: .6; .31; .03; .02 INJECTION, SOLUTION INTRAVENOUS at 14:12

## 2023-09-27 RX ADMIN — OXYCODONE HYDROCHLORIDE 5 MG: 5 SOLUTION ORAL at 21:18

## 2023-09-27 RX ADMIN — LEVOFLOXACIN 500 MG: 250 TABLET, FILM COATED ORAL at 08:09

## 2023-09-27 RX ADMIN — OXYCODONE HYDROCHLORIDE 5 MG: 5 SOLUTION ORAL at 06:17

## 2023-09-27 RX ADMIN — ACYCLOVIR 400 MG: 200 CAPSULE ORAL at 08:10

## 2023-09-27 RX ADMIN — FLUCONAZOLE 400 MG: 100 TABLET ORAL at 08:07

## 2023-09-27 RX ADMIN — CHLORHEXIDINE GLUCONATE 15 ML: 1.2 SOLUTION ORAL at 20:32

## 2023-09-27 RX ADMIN — OXYCODONE HYDROCHLORIDE 5 MG: 5 SOLUTION ORAL at 16:17

## 2023-09-27 RX ADMIN — GUAIFENESIN 200 MG: 200 SOLUTION ORAL at 16:17

## 2023-09-27 RX ADMIN — IPRATROPIUM BROMIDE 0.5 MG: 0.5 SOLUTION RESPIRATORY (INHALATION) at 01:06

## 2023-09-27 RX ADMIN — BUSPIRONE HYDROCHLORIDE 30 MG: 10 TABLET ORAL at 20:27

## 2023-09-27 RX ADMIN — NICOTINE 21 MG: 21 PATCH, EXTENDED RELEASE TRANSDERMAL at 08:34

## 2023-09-27 RX ADMIN — SODIUM CHLORIDE SOLN NEBU 3% 4 ML: 3 NEBU SOLN at 07:53

## 2023-09-27 RX ADMIN — BUSPIRONE HYDROCHLORIDE 30 MG: 10 TABLET ORAL at 16:17

## 2023-09-27 RX ADMIN — GUAIFENESIN 200 MG: 200 SOLUTION ORAL at 20:26

## 2023-09-27 RX ADMIN — LEVALBUTEROL HYDROCHLORIDE 1.25 MG: 1.25 SOLUTION RESPIRATORY (INHALATION) at 07:53

## 2023-09-27 RX ADMIN — IPRATROPIUM BROMIDE 0.5 MG: 0.5 SOLUTION RESPIRATORY (INHALATION) at 19:48

## 2023-09-27 RX ADMIN — FENTANYL CITRATE 25 MCG: 50 INJECTION INTRAMUSCULAR; INTRAVENOUS at 14:26

## 2023-09-27 RX ADMIN — GABAPENTIN 300 MG: 300 CAPSULE ORAL at 08:08

## 2023-09-27 RX ADMIN — SODIUM CHLORIDE SOLN NEBU 3% 4 ML: 3 NEBU SOLN at 19:48

## 2023-09-27 RX ADMIN — SERTRALINE HYDROCHLORIDE 50 MG: 50 TABLET ORAL at 08:08

## 2023-09-27 RX ADMIN — Medication 100 MCG: at 14:39

## 2023-09-27 RX ADMIN — GUAIFENESIN 200 MG: 200 SOLUTION ORAL at 08:07

## 2023-09-27 RX ADMIN — CHLORHEXIDINE GLUCONATE 15 ML: 1.2 SOLUTION ORAL at 08:06

## 2023-09-27 RX ADMIN — LISINOPRIL 40 MG: 20 TABLET ORAL at 08:07

## 2023-09-27 RX ADMIN — GABAPENTIN 300 MG: 300 CAPSULE ORAL at 16:17

## 2023-09-27 RX ADMIN — IPRATROPIUM BROMIDE 0.5 MG: 0.5 SOLUTION RESPIRATORY (INHALATION) at 07:53

## 2023-09-27 RX ADMIN — AMLODIPINE BESYLATE 10 MG: 5 TABLET ORAL at 08:08

## 2023-09-27 RX ADMIN — GABAPENTIN 300 MG: 300 CAPSULE ORAL at 20:27

## 2023-09-27 RX ADMIN — SULFAMETHOXAZOLE AND TRIMETHOPRIM 1 TABLET: 800; 160 TABLET ORAL at 08:08

## 2023-09-27 RX ADMIN — SODIUM CHLORIDE SOLN NEBU 3% 4 ML: 3 NEBU SOLN at 01:07

## 2023-09-27 RX ADMIN — PROPOFOL 25 MCG/KG/MIN: 10 INJECTION, EMULSION INTRAVENOUS at 14:16

## 2023-09-27 RX ADMIN — CARVEDILOL 12.5 MG: 12.5 TABLET, FILM COATED ORAL at 16:17

## 2023-09-27 RX ADMIN — CARVEDILOL 12.5 MG: 12.5 TABLET, FILM COATED ORAL at 08:08

## 2023-09-27 RX ADMIN — FENTANYL CITRATE 25 MCG: 50 INJECTION INTRAMUSCULAR; INTRAVENOUS at 14:19

## 2023-09-27 RX ADMIN — DIPHENHYDRAMINE HYDROCHLORIDE 25 MG: 50 INJECTION, SOLUTION INTRAMUSCULAR; INTRAVENOUS at 08:10

## 2023-09-27 RX ADMIN — Medication 10 ML: at 14:26

## 2023-09-27 RX ADMIN — RITUXIMAB 675 MG: 10 INJECTION, SOLUTION INTRAVENOUS at 09:19

## 2023-09-27 RX ADMIN — NYSTATIN 500000 UNITS: 100000 SUSPENSION ORAL at 21:32

## 2023-09-27 RX ADMIN — ACETAMINOPHEN 650 MG: 325 TABLET, FILM COATED ORAL at 08:08

## 2023-09-27 RX ADMIN — NYSTATIN 500000 UNITS: 100000 SUSPENSION ORAL at 08:06

## 2023-09-27 RX ADMIN — ALLOPURINOL 300 MG: 100 TABLET ORAL at 08:07

## 2023-09-27 RX ADMIN — FAMOTIDINE 20 MG: 20 TABLET, FILM COATED ORAL at 08:08

## 2023-09-27 RX ADMIN — FAMOTIDINE 20 MG: 20 TABLET, FILM COATED ORAL at 17:26

## 2023-09-27 RX ADMIN — IOHEXOL 25 ML: 350 INJECTION, SOLUTION INTRAVENOUS at 14:45

## 2023-09-27 RX ADMIN — MIDAZOLAM HYDROCHLORIDE 2 MG: 1 INJECTION, SOLUTION INTRAMUSCULAR; INTRAVENOUS at 14:02

## 2023-09-27 RX ADMIN — FENTANYL CITRATE 25 MCG: 50 INJECTION INTRAMUSCULAR; INTRAVENOUS at 14:14

## 2023-09-27 RX ADMIN — LEVALBUTEROL HYDROCHLORIDE 1.25 MG: 1.25 SOLUTION RESPIRATORY (INHALATION) at 19:48

## 2023-09-27 RX ADMIN — FENTANYL CITRATE 25 MCG: 50 INJECTION INTRAMUSCULAR; INTRAVENOUS at 14:32

## 2023-09-27 RX ADMIN — ACYCLOVIR 400 MG: 200 CAPSULE ORAL at 17:26

## 2023-09-27 RX ADMIN — GLUCAGON 1 MG: 1 INJECTION, POWDER, LYOPHILIZED, FOR SOLUTION INTRAMUSCULAR; INTRAVENOUS at 14:16

## 2023-09-27 RX ADMIN — NYSTATIN 500000 UNITS: 100000 SUSPENSION ORAL at 17:26

## 2023-09-27 RX ADMIN — LEVALBUTEROL HYDROCHLORIDE 1.25 MG: 1.25 SOLUTION RESPIRATORY (INHALATION) at 01:06

## 2023-09-27 RX ADMIN — Medication 8.8 MG: at 21:18

## 2023-09-27 NOTE — ASSESSMENT & PLAN NOTE
Wt Readings from Last 3 Encounters:   09/27/23 81.7 kg (180 lb 1.9 oz)   09/07/23 74.6 kg (164 lb 6.4 oz)   08/30/23 73.3 kg (161 lb 9.6 oz)          • Volume status: Euvolemic. • POA physical (limited): JVD-, HJR-, B/L LE trace to 1+ edema. • Echo 8/28/23: LVEF 60%. D1DD. • CXR 9/22: Persistent pulmonary venous congestion with small effusions and bibasilar atelectasis.  IV lasix given with even I/O.        Plan:  • CMP, magnesium; Goal Mg > 2 and K > 4; Replete prn  • HOB > 30°, Daily standing weights, Measure I/O  • Will continue holding diuresis SLEEP PATTERN, DISTURBED SLEEP PATTERN, DISTURBED SLEEP PATTERN, DISTURBED SLEEP PATTERN, DISTURBED SLEEP PATTERN, DISTURBED SLEEP PATTERN, DISTURBED SLEEP PATTERN, DISTURBED SLEEP PATTERN, DISTURBED SLEEP PATTERN, DISTURBED

## 2023-09-27 NOTE — PROGRESS NOTES
[facility]  Progress Note  Name: Maria De Jesus Choudhury  MRN: [id number]  Unit/Bed#: ICU 03 I Date of Admission: 9/13/2023   Date of Service: 9/27/2023 I Hospital Day: 14    Assessment/Plan   * Acute respiratory failure with hypoxia secondary to oropharyngeal mass  Assessment & Plan  • POA with O2 saturate around 80% on room air.  Needed high flow 60 L to bring up her O2 saturation to 95%. • Recent hospitalization for respiratory failure second to pneumonia. • CTA PE study negative for PE.  New onset RML PNA. • Persistent partial atelectasis of the lower lobes noted. • Current daily smoker.        Plan:  • Tracheostomy 9/17. Tracheal cuff in place  • Completed Decadron. • Atrovent/Xopenex  • Airway clearance protocol. IS.  • Continue 3% saline nebs     Large cell lymphoma (HCC)  Assessment & Plan  • Biopsy on 9/17: Large B-cell lymphoma, germinal center B-cell type, c-MYC and BCL-2 double expression. Ki-67 proliferation index is up to 90%; FISH & NGS pending. • Staging work-up: Currently stage II. • First inpatient chemotherapy 9/22/2023- with R-EPOCH.     Plan:  • Inpatient hematology oncology following. Recommendation appreciated. • Outpatient follow-up with hematology oncology. • Close TLS workup- uric acid, electrolytes  • Allopurinol as prophylaxis  • PEG tube in place    Oropharyngeal mass  Assessment & Plan  • 9/14 Neck/ soft tissue CT: Large enhancing soft tissue mass identified within the left neck involving multiple spaces. This appears to be centered within the left oropharynx with extensions as described above into multiple spaces. This results in significant airway compromise. 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 Small level 3 and level 4 nodes are seen within the neck. • Tracheostomy by ENT, bx & addition testing performed  • Bx: Positive for malignancy, favor poorly differentiated carcinoma. Cannot entirely exclude a high grade large cell lymphoma. Portion of specimen submitted for flow cytometry. • H/o tobacco abuse, daily smoker. • Daughter was updated at bedside. • Preliminary biopsy: Large B-cell lymphoma, germinal center B-cell type, c-MYC and BCL-2 double expression. • First chemotherapy on 9/22.     Plan:  • ENT/ Med onc following, appreciated  • Follow final biopsy report  • Tracheal cuff in place, PEG tube in place    CKD (chronic kidney disease) stage 3, GFR 30-59 ml/min [facility])  Assessment & Plan  Lab Results   Component Value Date    EGFR 89 09/26/2023    EGFR 85 09/25/2023    EGFR 80 09/24/2023    CREATININE 0.68 09/26/2023    CREATININE 0.72 09/25/2023    CREATININE 0.76 09/24/2023   Stable at baseline,   Avoid nephrotoxic medications and fluctuations in blood pressure    Benign essential hypertension  Assessment & Plan  • Well controlled at home. • Home meds: Amlodipine 10 mg daily, Coreg 25 mg twice daily, lisinopril 10 mg daily. • Elevated BP may second to anxiety from chemotherapy/new diagnosis lymphoma, chronic pain.     Plan:  • Coreg 12.5  • Amlodipine 10 mg daily. •  Lisinopril 40 mg daily. • PRN hydralazine if BP >160. • Consider increasing PO regimen given persistent hype    (HFpEF) heart failure with preserved ejection fraction [facility])  Assessment & Plan  Wt Readings from Last 3 Encounters:   09/27/23 81.7 kg (180 lb 1.9 oz)   09/07/23 74.6 kg (164 lb 6.4 oz)   08/30/23 73.3 kg (161 lb 9.6 oz)          • Volume status: Euvolemic. • POA physical (limited): JVD-, HJR-, B/L LE trace to 1+ edema. • Echo 8/28/23: LVEF 60%. D1DD. • CXR 9/22: Persistent pulmonary venous congestion with small effusions and bibasilar atelectasis. IV lasix given with even I/O.        Plan:  • CMP, magnesium; Goal Mg > 2 and K > 4; Replete prn  • HOB > 30°, Daily standing weights, Measure I/O  • Will continue holding diuresis      Chemotherapy induced neutropenia (HCC)  Assessment & Plan  • Monitor CBC   • Monitor for need to transfuse   ?  Hemoglobin < 7 ? Platlets < 10,000     Blister (nonthermal) of left forearm, sequela  Assessment & Plan  • 9/17/23: Blisters of LUE noted, no signs of infection  • Daily wound care, monitor signs of infection     Angioedema  Assessment & Plan  • POA with acute respiratory failure, SOB. • On physical in [place] ED: Swollen tongue, swelling soft and hard palate and almost the head of all phalanx is completely closed. Severe angioedema. • Decadron 10 mg, Benadryl 25 mg given. • Initially refused but eventually agreed with intubation. • Prior episode of angioedema in 2020 noted. Suspected coadministration of increase the dose of tramadol and lisinopril. • Per daughter, there is no recent change in medications except Trelegy inhaler, cefdinir. 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        Plan:  • Trach placed 9/17  • Tolerating trach, No issue    Ambulatory dysfunction  Assessment & Plan  PT OT evaluation recommend postacute rehab  Fall precaution    Pain syndrome, chronic  Assessment & Plan  • Home regimen: Oxycodone 5 mg twice daily as needed. Tylenol 650 mg every 8 hours as needed. Gabapentin 600 mg 3 times daily. Medical marijuana. • Per daughter, patient was overusing Tylenol over-the-counter. • Pain mostly from lumbar radiculopathy. • Impaired ambulatory dysfunction second to pain.     Plan:  • Continue gabapentin 300 3 times daily, scheduled oxycodone 5 mg 3 times daily, as needed Tylenol 650 mg every 6 hours.   As needed fentanyl 50 mcg every 2 hours for severe pain    Hyperlipidemia  Assessment & Plan  Lab Results   Component Value Date     CHOLESTEROL 203 (H) 01/24/2023     CHOLESTEROL 177 08/11/2022     CHOLESTEROL 184 07/08/2020            Lab Results   Component Value Date     HDL 45 (L) 01/24/2023     HDL 37 (L) 08/11/2022     HDL 44 (L) 07/08/2020            Lab Results   Component Value Date     TRIG 166 (H) 09/16/2023     TRIG 160 (H) 01/24/2023     TRIG 108 08/11/2022            Lab Results   Component Value Date     NONHDL 146 2018     [facility]      Continue outpatient diet/ lifestyle modification                 VTE Pharmacologic Prophylaxis: VTE Score: 7 High Risk (Score >/= 5) - Pharmacological DVT Prophylaxis Ordered: enoxaparin (Lovenox). Sequential Compression Devices Ordered. Patient Centered Rounds: I performed bedside rounds with nursing staff today. Discussions with Specialists or Other Care Team Provider: senior otiliat and the attending     Education and Discussions with Family / Patient: Patient declined call to . Total Time Spent on Date of Encounter in care of patient: 25 mins. This time was spent on one or more of the following: performing physical exam; counseling and coordination of care; obtaining or reviewing history; documenting in the medical record; reviewing/ordering tests, medications or procedures; communicating with other healthcare professionals and discussing with patient's family/caregivers. Current Length of Stay: 14 day(s)  Current Patient Status: Inpatient   Certification Statement: The patient will continue to require additional inpatient hospital stay due to Chemotherapy and hypoxia management   Discharge Plan: Anticipate discharge in 48 hrs to rehab facility. Code Status: Level 1 - Full Code    Subjective:   Pt is currently stable, with tracheal cuff and PEG tube in place. Pt denies any discomfort. Denies chest pain or shortness or breath, headachess. Objective:     Vitals:   Temp (24hrs), Av.5 °F (36.9 °C), Min:97.4 °F (36.3 °C), Max:99 °F (37.2 °C)    Temp:  [97.4 °F (36.3 °C)-99 °F (37.2 °C)] 98.5 °F (36.9 °C)  HR:  [] 84  Resp:  [12-31] 16  BP: (132-198)/() 169/83  SpO2:  [85 %-98 %] 93 %  Body mass index is 34.03 kg/m². Input and Output Summary (last 24 hours):      Intake/Output Summary (Last 24 hours) at 2023 1624  Last data filed at 2023 1500  Gross per 24 hour Intake 1005 ml   Output 3648 ml   Net -2643 ml       Physical Exam:   [unfilled] Physical Exam  Constitutional:       Appearance: She is ill-appearing. HENT:      Head: Normocephalic. Right Ear: External ear normal.      Left Ear: External ear normal.      Nose: Nose normal.      Mouth/Throat:      Mouth: Mucous membranes are moist.      Comments: Carcinoma on the left side   Eyes:      Extraocular Movements: Extraocular movements intact. Conjunctiva/sclera: Conjunctivae normal.      Pupils: Pupils are equal, round, and reactive to light. Neck:      Comments: Tracheal cuff in place    Cardiovascular:      Rate and Rhythm: Normal rate and regular rhythm. Pulses: Normal pulses. Heart sounds: Normal heart sounds. Pulmonary:      Effort: Pulmonary effort is normal.      Breath sounds: Normal breath sounds. Abdominal:      General: Bowel sounds are normal.      Palpations: Abdomen is soft. Comments: PEG Tube in place   Musculoskeletal:         General: Normal range of motion. Cervical back: Normal range of motion. Skin:     General: Skin is warm. Capillary Refill: Capillary refill takes less than 2 seconds. Neurological:      General: No focal deficit present. Mental Status: She is alert and oriented to person, place, and time. Additional Data:     Labs:  Results from last 7 days   Lab Units 09/26/23  0504 09/25/23  0539 09/24/23  0500 09/23/23  0529   WBC Thousand/uL 8.67   < > 13.95* 9.72   HEMOGLOBIN g/dL 11.7   < > 11.0* 10.6*   HEMATOCRIT % 36.4   < > 34.2* 32.8*   PLATELETS Thousands/uL 219   < > 221 181   BANDS PCT %  --   --  2  --    NEUTROS PCT %  --   --   --  90*   LYMPHS PCT %  --   --   --  4*   LYMPHO PCT %  --   --  4*  --    MONOS PCT %  --   --   --  5   MONO PCT %  --   --  0*  --    EOS PCT %  --   --  0 0    < > = values in this interval not displayed.      Results from last 7 days   Lab Units 09/26/23  0504   SODIUM mmol/L 137   POTASSIUM mmol/L 3.7   CHLORIDE mmol/L 99   CO2 mmol/L 31   BUN mg/dL 27*   CREATININE mg/dL 0.68   ANION GAP mmol/L 7   CALCIUM mg/dL 9.6   ALBUMIN g/dL 2.9*   TOTAL BILIRUBIN mg/dL 0.38   ALK PHOS U/L 52   ALT U/L 19   AST U/L 14   GLUCOSE RANDOM mg/dL 138     Results from last 7 days   Lab Units 09/27/23  0628   INR  0.96                   Lines/Drains:  Invasive Devices     Peripherally Inserted Central Catheter Line  Duration           PICC Line 32/24/99 Right Basilic 6 days          Peripheral Intravenous Line  Duration           Long-Dwell Peripheral IV (Midline) 25/09/37 Left Basilic 11 days          Drain  Duration           NG/OG/Enteral Tube Nasogastric 16 Fr Right nare 5 days    Gastrostomy/Enterostomy Percutaneous Endoscopic Gastrostomy (PEG) 16 Fr. LUQ <1 day          Airway  Duration           Surgical Airway Federico Uncuffed 10 days                Central Line:  Goal for removal: N/A - Chronic PICC             Imaging: No pertinent imaging reviewed.     Recent Cultures (last 7 days):         Last 24 Hours Medication List:   Current Facility-Administered Medications   Medication Dose Route Frequency Provider Last Rate   • acetaminophen  650 mg Oral Q6H PRN Jer Leija MD     • acyclovir  400 mg Oral BID Jer Leija MD     • allopurinol  300 mg Oral Daily Trent Spencer MD     • alteplase  2 mg Intracatheter Q1MIN PRN Jer Leija MD     • amLODIPine  10 mg Oral Daily Trent Spencer MD     • busPIRone  30 mg Oral TID Jer Leija MD     • carvedilol  12.5 mg Oral BID With Meals Stephen Duran MD     • chlorhexidine  15 mL Mouth/Throat Q12H [address] Trent Spencer MD     • enoxaparin  40 mg Subcutaneous Q24H [address] Tretn Spencer MD     • famotidine  20 mg Oral BID Jer Leija MD     • fluconazole  400 mg Oral Daily Trent Spencer MD     • gabapentin  300 mg Oral TID Jayme Lackey MD     • guaiFENesin  200 mg Oral Q6H Trent Spencer MD     • hydrALAZINE  10 mg Intravenous Q4H PRN Jayme Lackey MD     • levalbuterol  1.25 mg Nebulization Q6H Trent Spencer MD      And   • ipratropium  0.5 mg Nebulization Q6H Trent Spencer MD     • levalbuterol  1.25 mg Nebulization Q8H PRN Jayme Lackey MD     • levofloxacin  500 mg Oral Q24H [address] Trent Spencer MD     • lidocaine 1% buffered   Infiltration PRN Robert Baltazar MD     • lisinopril  40 mg Oral Daily Stanton Chowdary MD     • nicotine  21 mg Transdermal Daily Trent Spencer MD     • nystatin  500,000 Units Swish & Swallow 4x Daily Trent Spencer MD     • ondansetron  4 mg Intravenous Q8H PRN Trent Spencer MD     • oxyCODONE  2.5 mg Oral Q4H PRN Jayme Lackey MD      Or   • oxyCODONE  5 mg Oral Q4H PRN Jayme Lackey MD     • oxyCODONE  5 mg Oral Q8H Trent Spencer MD     • polyethylene glycol  17 g Oral Daily PRN Jayme Lackey MD     • senna  8.8 mg Per NG Tube HS Trent Spencer MD     • sertraline  50 mg Oral Daily Trent Spencer MD     • sodium chloride  4 mL Nebulization Q6H Trent Spencer MD     • sulfamethoxazole-trimethoprim  1 tablet Oral Once per day on Mon Wed Fri Jayme Lackey MD          Today, Patient Was Seen By: Tamia Null MD    **Please Note: This note may have been constructed using a voice recognition system. **

## 2023-09-27 NOTE — ASSESSMENT & PLAN NOTE
• Home regimen: Oxycodone 5 mg twice daily as needed. Tylenol 650 mg every 8 hours as needed. Gabapentin 600 mg 3 times daily. Medical marijuana. • Per daughter, patient was overusing Tylenol over-the-counter. • Pain mostly from lumbar radiculopathy. • Impaired ambulatory dysfunction second to pain.     Plan:  • Continue gabapentin 300 3 times daily, scheduled oxycodone 5 mg 3 times daily, as needed Tylenol 650 mg every 6 hours.   As needed fentanyl 50 mcg every 2 hours for severe pain

## 2023-09-27 NOTE — SEDATION DOCUMENTATION
G tube insertion completed by Dr. Dafne Burt. Light gauze temporarily placed at the site - tube to be kept open to air once patient in ICU. See anesthesia charting for vital signs. Patient tolerated well and will be transferred to PACU in stable condition.

## 2023-09-27 NOTE — ASSESSMENT & PLAN NOTE
• POA with acute respiratory failure, SOB. • On physical in [place] ED: Swollen tongue, swelling soft and hard palate and almost the head of all phalanx is completely closed. Severe angioedema. • Decadron 10 mg, Benadryl 25 mg given. • Initially refused but eventually agreed with intubation. • Prior episode of angioedema in 2020 noted. Suspected coadministration of increase the dose of tramadol and lisinopril. • Per daughter, there is no recent change in medications except Trelegy inhaler, cefdinir.   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        Plan:  • Trach placed 9/17  • Tolerating trach, No issue

## 2023-09-27 NOTE — ASSESSMENT & PLAN NOTE
Lab Results   Component Value Date    EGFR 89 09/26/2023    EGFR 85 09/25/2023    EGFR 80 09/24/2023    CREATININE 0.68 09/26/2023    CREATININE 0.72 09/25/2023    CREATININE 0.76 09/24/2023   Stable at baseline,   Avoid nephrotoxic medications and fluctuations in blood pressure

## 2023-09-27 NOTE — QUICK NOTE
[facility] Attending Physician Attestation [facility] - Progress Note    I have seen and examined [facility] personally and have reviewed the medical record independently. I have reviewed the case with the resident physician including all assessments and the plan of care for each. I agree with the resident physician and offer the following addendum to the below statements by the resident physician:     49-year-old female initially admitted to ICU on 9/13 due to tongue swelling and required intubation for airway protection. 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 She was started on inpatient chemotherapy R-EPOCH, appeared to be tolerating well. No signs of tumor lysis syndrome. To get Rituxan today. Oncology following    Elevated BP noted. Likely contributed by the use of high-dose steroid. We will continue beta-blocker, amlodipine and lisinopril. Status post trach. Will be going for PEG tube placement today.   Rehab recommended    For detailed history, assessment, and plan of care, please review the statements below by Dr. Roseann MD

## 2023-09-27 NOTE — ASSESSMENT & PLAN NOTE
• 9/17/23: Blisters of LUE noted, no signs of infection  • Daily wound care, monitor signs of infection Staging Info: By selecting yes to the question above you will include information on AJCC 8 tumor staging in your Mohs note. Information on tumor staging will be automatically added for SCCs on the head and neck. AJCC 8 includes tumor size, tumor depth, perineural involvement and bone invasion.

## 2023-09-27 NOTE — PROGRESS NOTES
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 CT AP with no lymphadenopathy; Brain MRI no lesion; stage II Large B Cell Lymphoma. She started her dose-adjusted systemic treatment, R-EPOCH, on 23 with the last dose on 23. She is scheduled to have Rituxan infusion on 23. Her CMP and uric acid have been unremarkable for tumor lysis. PICC line intact. Patient is planned to have peg tube placement while inpatient. Overall, patient tolerating chemotherapy very well. Discussed drug profile and possible side effects of rituxamab with patient and patient's daughter. Consent signed. Assessment and Plan  1. 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 Left Jugular Lymphadenopathy     Started systemic treatment: EPOCH-R Cycle 1 day 1-3: Etoposide, Doxorubicin, Vincristine , Cyclophosphamide , prednisone + Rituximab (23-23)    CMP  Uric acid 2.2 () < 2.4 < 2.7 < 2.8   WNL  Potassium 3.7 <3.4 < 3.6 < 3.4   WNL  Calcium 10.5 < 8.38 < 8.8   Total Bili 0.24 <0.30   WNL  Phosphorus 2.2 < 2.1 < 2.5  WNL  Creatinine 0.68   WNL  AST/ALT    WNL    CBC  Hemoglobin 11.7 () < 11.2 < 10.6 < 10.9  WBC 8.67 ()11.23 < 13.95 < 9.72  Platelets 983 () < 204 < 221 < 181    Imagin23 CTA Chest Rt middle lobe consolidation (PNA), slight regression of previous hilar and mediastinal LNs (had recent PNA in 2023)      23 CT Soft Tissue Neck w contrast: soft tissue mass ~ 6.1 x 4.7 x 5.2 cm appears to be centered within Lt oropharynx involving multiple spaces and extends into the nasopharynx. .. multiple small jugular chain nodes on the left.      23 nasopharynx mass biopsy initial results positive for malignancy favor poorly differentiated carcinoma. Cannot exclude lymphoma. Flow cytometry pending.      PLAN:  1) S/p Day 3/3 for Cycle 1 EPOCH (etoposidevincristine, cyclophosphamide, doxorubicin) . Proceed with Rituxamab infusion today, 9/26/23. Discussed with patient and patient daughter regarding immunotherapy profile along with possible side effects, such as intense rigors and chills. Consent signed. 2) Continue to monitor for tumor lysis syndrome. CMP unremarkable, uric acid 2.4. Daily CBCs, CMP, uric acid, phosphorus. Will repeat uric acid and calcium for today. 3) Continue to monitor renal and hepatic function (etoposide, cyclophosphamide). 4) Prophylaxis medications in place    5) Outpatient follow up plan: Dr. Jazzmine Sandy 10/03/23     Subjective:    Review of Systems   Constitutional: Negative for chills, diaphoresis and fever. HENT: Negative for ear pain. Neck mass    Eyes: Negative for pain and visual disturbance. Respiratory: Negative for cough, chest tightness, shortness of breath and wheezing. Cardiovascular: Negative for chest pain and palpitations. Gastrointestinal: Negative for abdominal pain, blood in stool, diarrhea, nausea and vomiting. Genitourinary: Negative for difficulty urinating and hematuria. Musculoskeletal: Negative for back pain. Neurological: Negative for dizziness, weakness and headaches. Psychiatric/Behavioral: Negative for agitation.      Objective:     Medication Administration - last 24 hours from 09/26/2023 1117 to 09/27/2023 1117       Date/Time Order Dose Route Action Action by     09/27/2023 0806 EDT chlorhexidine (PERIDEX) 0.12 % oral rinse 15 mL 15 mL Mouth/Throat Given Mora Ferrer RN     09/26/2023 2258 EDT chlorhexidine (PERIDEX) 0.12 % oral rinse 15 mL 15 mL Mouth/Throat Not Given Ned Ramirez RN     09/27/2023 8877 EDT busPIRone (BUSPAR) tablet 30 mg 30 mg Oral Given Mora Ferrer RN     09/26/2023 2300 EDT busPIRone (BUSPAR) tablet 30 mg 30 mg Oral Given Dutch An, RN     09/26/2023 1700 EDT busPIRone (BUSPAR) tablet 30 mg 30 mg Oral Given Eulogio Rogers, RN     09/27/2023 8052 EDT sertraline (ZOLOFT) tablet 50 mg 50 mg Oral Given Karyna Jean, RN     09/27/2023 9240 EDT nystatin (MYCOSTATIN) oral suspension 500,000 Units 500,000 Units Swish & Swallow Given Karyna Jena, RN     09/26/2023 2258 EDT nystatin (MYCOSTATIN) oral suspension 500,000 Units 500,000 Units Swish & Swallow Not Given Dutch An, RN     09/26/2023 1702 EDT nystatin (MYCOSTATIN) oral suspension 500,000 Units 500,000 Units Swish & Swallow Given Eulogio Rogers, RN     09/26/2023 1257 EDT nystatin (MYCOSTATIN) oral suspension 500,000 Units 500,000 Units Swish & Swallow Given Eulogio Rogers, RN     09/27/2023 5086 EDT levalbuterol Cesar Sony) inhalation solution 1.25 mg 1.25 mg Nebulization Given Giselle Yaima, RT     09/27/2023 0106 EDT levalbuterol (Cesar Sony) inhalation solution 1.25 mg 1.25 mg Nebulization Given Linda Orf, RT     09/26/2023 1944 EDT levalbuterol (Cesar Sony) inhalation solution 1.25 mg 1.25 mg Nebulization Given Linda Orf, RT     09/26/2023 1328 EDT levalbuterol (Cesar Sony) inhalation solution 1.25 mg 1.25 mg Nebulization Given Giselle Yaima, RT     09/27/2023 0753 EDT ipratropium (ATROVENT) 0.02 % inhalation solution 0.5 mg 0.5 mg Nebulization Given Giselle Yaima, RT     09/27/2023 0106 EDT ipratropium (ATROVENT) 0.02 % inhalation solution 0.5 mg 0.5 mg Nebulization Given Linda Orf, RT     09/26/2023 1944 EDT ipratropium (ATROVENT) 0.02 % inhalation solution 0.5 mg 0.5 mg Nebulization Given Linda Orf, RT     09/26/2023 1328 EDT ipratropium (ATROVENT) 0.02 % inhalation solution 0.5 mg 0.5 mg Nebulization Given Giselle Erwin, RT     09/27/2023 3875 EDT gabapentin (NEURONTIN) capsule 300 mg 300 mg Oral Given Karyna Jean, RN     09/26/2023 2258 EDT gabapentin (NEURONTIN) capsule 300 mg 300 mg Oral Given Dutch An, TINO     09/26/2023 1700 EDT gabapentin (NEURONTIN) capsule 300 mg 300 mg Oral Given River Beltrán, RN     09/27/2023 0713 EDT nicotine (NICODERM CQ) 21 mg/24 hr TD 24 hr patch 21 mg 21 mg Transdermal Medication Applied [facility], RN     09/27/2023 1982 EDT nicotine (NICODERM CQ) 21 mg/24 hr TD 24 hr patch 21 mg 21 mg Transdermal Patch Removed [facility], RN     09/27/2023 0805 EDT enoxaparin (LOVENOX) subcutaneous injection 40 mg 40 mg Subcutaneous Not Given [facility],      09/27/2023 7265 EDT amLODIPine (NORVASC) tablet 10 mg 10 mg Oral Given [facility], RN     09/26/2023 2301 EDT senna oral syrup 8.8 mg 8.8 mg Per NG Tube Given [facility], RN     09/27/2023 9010 EDT guaiFENesin (ROBITUSSIN) oral liquid 200 mg 200 mg Oral Given [facility],      09/27/2023 0215 EDT guaiFENesin (ROBITUSSIN) oral liquid 200 mg 200 mg Oral Not Given [facility], RN     09/26/2023 2258 EDT guaiFENesin (ROBITUSSIN) oral liquid 200 mg 200 mg Oral Given [facility], RN     09/26/2023 1428 EDT guaiFENesin (ROBITUSSIN) oral liquid 200 mg 200 mg Oral Given River Jerel, RN     09/27/2023 5273 EDT famotidine (PEPCID) tablet 20 mg 20 mg Oral Given [facility],      09/26/2023 1702 EDT famotidine (PEPCID) tablet 20 mg 20 mg Oral Given [facility], RN     09/27/2023 1011 EDT allopurinol (ZYLOPRIM) tablet 300 mg 300 mg Oral Given [facility],      09/26/2023 1259 EDT allopurinol (ZYLOPRIM) tablet 300 mg -- Oral MAR Unhold Lopez Isidro MD     09/26/2023 1258 EDT allopurinol (ZYLOPRIM) tablet 300 mg -- Oral MAR Hold Lopez Isidro MD     09/27/2023 0809 EDT levofloxacin (LEVAQUIN) tablet 500 mg 500 mg Oral Given Blanca Ag RN     09/26/2023 1259 EDT levofloxacin (LEVAQUIN) tablet 500 mg -- Oral MAR Unhold Lopez Isidro MD     09/26/2023 1258 EDT levofloxacin (LEVAQUIN) tablet 500 mg -- Oral MAR Hold Lopez Isidro MD     09/27/2023 3175 EDT fluconazole (DIFLUCAN) tablet 400 mg 400 mg Oral Given Blanca Ag RN 09/26/2023 1259 EDT fluconazole (DIFLUCAN) tablet 400 mg -- Oral MAR Unhold Sheila Collins MD     09/26/2023 1258 EDT fluconazole (DIFLUCAN) tablet 400 mg -- Oral MAR Hold Sheila Collins MD     09/27/2023 4157 EDT acyclovir (ZOVIRAX) capsule 400 mg 400 mg Oral Given Idalmis Neville, TINO     09/26/2023 1702 EDT acyclovir (ZOVIRAX) capsule 400 mg 400 mg Oral Given Ayden Gibbons RN     09/26/2023 1259 EDT acyclovir (ZOVIRAX) capsule 400 mg -- Oral MAR Unhold Sheila Collins MD     09/26/2023 1258 EDT acyclovir (ZOVIRAX) capsule 400 mg -- Oral MAR Hold Sheila Collins MD     09/27/2023 7442 EDT sulfamethoxazole-trimethoprim (BACTRIM DS) 800-160 mg per tablet 1 tablet 1 tablet Oral Given Idalmis Neville, TINO     09/26/2023 1259 EDT sulfamethoxazole-trimethoprim (BACTRIM DS) 800-160 mg per tablet 1 tablet -- Oral HIWOT Land MD     09/26/2023 1258 EDT sulfamethoxazole-trimethoprim (BACTRIM DS) 800-160 mg per tablet 1 tablet -- Oral MAR Hold Sheila Collins MD     09/26/2023 1259 EDT alteplase (CATHFLO) injection 2 mg -- Intracatheter HIWOT Land MD     09/26/2023 1258 EDT alteplase (CATHFLO) injection 2 mg -- Intracatheter MAR Hold Sheila Collins MD     09/26/2023 1259 EDT sodium chloride 0.9 % infusion -- Intravenous HIWOT Land MD     09/26/2023 1258 EDT sodium chloride 0.9 % infusion -- Intravenous MAR Marylen Sames, MD     09/26/2023 1142 EDT sodium chloride 0.9 % infusion 20 mL/hr Intravenous Margarito Martínez, TINO     09/26/2023 1702 EDT predniSONE tablet 100 mg 100 mg Oral Given Ayden Gibbons, TINO     09/26/2023 1259 EDT predniSONE tablet 100 mg -- Oral HIWOT Land MD     09/26/2023 1258 EDT predniSONE tablet 100 mg -- Oral MAR Marylen Sames, MD     09/26/2023 1259 EDT ondansetron (ZOFRAN) injection 4 mg -- Intravenous HIWOT Land MD     09/26/2023 1258 EDT ondansetron (ZOFRAN) injection 4 mg -- Intravenous MAR Nithya Maguire MD     09/26/2023 1704 EDT sodium chloride 0.9 % bolus 2,000 mL 0 mL Intravenous Stopped Sepideh Bryant RN     09/26/2023 1231 EDT cyclophosphamide (CYTOXAN) 1,350 mg in sodium chloride 0.9 % 250 mL IVPB 0 mg Intravenous Stopped Lei Lowery RN     09/26/2023 1147 EDT cyclophosphamide (CYTOXAN) 1,350 mg in sodium chloride 0.9 % 250 mL IVPB 1,350 mg Intravenous [address] [facility], [address]     09/27/2023 7251 EDT oxyCODONE (ROXICODONE) oral solution 5 mg 5 mg Oral Given Abdirashid Ross, TINO     09/26/2023 2258 EDT oxyCODONE (ROXICODONE) oral solution 5 mg 5 mg Oral Given Abdirashid Ross, TINO     09/26/2023 1428 EDT oxyCODONE (ROXICODONE) oral solution 5 mg 5 mg Oral Given Sepideh Bryant RN     09/26/2023 1442 EDT hydrALAZINE (APRESOLINE) injection 10 mg 10 mg Intravenous Given Sepideh Bryant, RN     09/27/2023 0753 EDT sodium chloride 3 % inhalation solution 4 mL 4 mL Nebulization Given Lai Carville, RT     09/27/2023 0107 EDT sodium chloride 3 % inhalation solution 4 mL 4 mL Nebulization Given Ky Dyke, RT     09/26/2023 1944 EDT sodium chloride 3 % inhalation solution 4 mL 4 mL Nebulization Given Ky Dyke, RT     09/26/2023 1328 EDT sodium chloride 3 % inhalation solution 4 mL 4 mL Nebulization Given Lai Carville, RT     09/26/2023 2258 EDT metoprolol tartrate (LOPRESSOR) tablet 25 mg 25 mg Oral Given Abdirashid Ross RN     09/27/2023 1104 EDT riTUXimab (RITUXAN) 675 mg in sodium chloride 0.9 % 270 mL first titrated chemo infusion -- Intravenous Rate/Dose Change Quang Valles RN     09/27/2023 1029 EDT riTUXimab (RITUXAN) 675 mg in sodium chloride 0.9 % 270 mL first titrated chemo infusion -- Intravenous Rate/Dose Change Barwillie Valles RN     09/27/2023 0954 EDT riTUXimab (RITUXAN) 675 mg in sodium chloride 0.9 % 270 mL first titrated chemo infusion -- Intravenous Rate/Dose Change Quang Valles RN     09/27/2023 0919 EDT riTUXimab (RITUXAN) 675 mg in sodium chloride 0.9 % 270 mL first titrated chemo infusion 675 mg Intravenous [address] Francesca Gonzalez     09/26/2023 1634 EDT acetaminophen (TYLENOL) tablet 650 mg 650 mg Oral Not Given Lina Mack RN     09/26/2023 1634 EDT diphenhydrAMINE (BENADRYL) injection 25 mg 25 mg Intravenous Not Given Lina Mack RN     09/27/2023 9131 EDT diphenhydrAMINE (BENADRYL) injection 25 mg 25 mg Intravenous Given Colletta Oyster, RN     09/27/2023 1395 EDT acetaminophen (TYLENOL) tablet 650 mg 650 mg Oral Given Colletta Oyster, TINO     09/27/2023 4122 EDT lisinopril (ZESTRIL) tablet 40 mg 40 mg Oral Given Colletta Oyster, TINO     09/27/2023 8452 EDT carvedilol (COREG) tablet 12.5 mg 12.5 mg Oral Given Colletta Oyster, TINO          /73 (BP Location: Right leg)   Pulse 75   Temp 99 °F (37.2 °C) (Oral)   Resp 18   Ht 5' 1" (1.549 m)   Wt 81.7 kg (180 lb 1.9 oz)   SpO2 92%   BMI 34.03 kg/m²       Physical Exam  Constitutional:       Appearance: She is not ill-appearing. HENT:      Head: Normocephalic and atraumatic. Comments:  NG tube R nostril     Right Ear: External ear normal.      Left Ear: External ear normal.      Nose: No congestion or rhinorrhea. Mouth/Throat:      Mouth: Mucous membranes are moist.   Eyes:      Extraocular Movements: Extraocular movements intact. Conjunctiva/sclera: Conjunctivae normal.      Pupils: Pupils are equal, round, and reactive to light. Neck:      Comments: Trach mask in place, humidified. Cardiovascular:      Rate and Rhythm: Normal rate and regular rhythm. Pulses: Normal pulses. Heart sounds: Normal heart sounds. No murmur heard. No gallop. Pulmonary:      Effort: Pulmonary effort is normal. No respiratory distress. Breath sounds: No wheezing, rhonchi or rales. Abdominal:      General: Bowel sounds are normal. There is no distension. Palpations: There is no mass. Tenderness: There is no abdominal tenderness. There is no guarding.    Musculoskeletal:      Right lower leg: No edema. Left lower leg: No edema. Skin:     General: Skin is warm. Capillary Refill: Capillary refill takes less than 2 seconds. Coloration: Skin is not jaundiced or pale. Findings: Rash present. Neurological:      General: No focal deficit present. Mental Status: She is alert and oriented to person, place, and time. Psychiatric:         Mood and Affect: Mood normal.         Behavior: Behavior normal.         Thought Content:  Thought content normal.         Judgment: Judgment normal.         Recent Results (from the past 48 hour(s))   CBC and differential    Collection Time: 09/26/23  5:04 AM   Result Value Ref Range    WBC 8.67 4.31 - 10.16 Thousand/uL    RBC 3.84 3.81 - 5.12 Million/uL    Hemoglobin 11.7 11.5 - 15.4 g/dL    Hematocrit 36.4 34.8 - 46.1 %    MCV 95 82 - 98 fL    MCH 30.5 26.8 - 34.3 pg    MCHC 32.1 31.4 - 37.4 g/dL    RDW 14.9 11.6 - 15.1 %    MPV 9.5 8.9 - 12.7 fL    Platelets 367 614 - 331 Thousands/uL    nRBC 0 /100 WBCs   Comprehensive metabolic panel    Collection Time: 09/26/23  5:04 AM   Result Value Ref Range    Sodium 137 135 - 147 mmol/L    Potassium 3.7 3.5 - 5.3 mmol/L    Chloride 99 96 - 108 mmol/L    CO2 31 21 - 32 mmol/L    ANION GAP 7 mmol/L    BUN 27 (H) 5 - 25 mg/dL    Creatinine 0.68 0.60 - 1.30 mg/dL    Glucose 138 65 - 140 mg/dL    Calcium 9.6 8.4 - 10.2 mg/dL    Corrected Calcium 10.5 (H) 8.3 - 10.1 mg/dL    AST 14 13 - 39 U/L    ALT 19 7 - 52 U/L    Alkaline Phosphatase 52 34 - 104 U/L    Total Protein 6.2 (L) 6.4 - 8.4 g/dL    Albumin 2.9 (L) 3.5 - 5.0 g/dL    Total Bilirubin 0.38 0.20 - 1.00 mg/dL    eGFR 89 ml/min/1.73sq m   Phosphorus    Collection Time: 09/26/23  5:04 AM   Result Value Ref Range    Phosphorus 2.3 2.3 - 4.1 mg/dL   Uric acid    Collection Time: 09/26/23  5:04 AM   Result Value Ref Range    Uric Acid 2.2 2.0 - 7.5 mg/dL   LD,Blood    Collection Time: 09/26/23  5:04 AM   Result Value Ref Range     140 - 271 U/L Magnesium    Collection Time: 09/26/23  5:04 AM   Result Value Ref Range    Magnesium 2.0 1.9 - 2.7 mg/dL   Protime-INR    Collection Time: 09/27/23  6:28 AM   Result Value Ref Range    Protime 13.4 11.6 - 14.5 seconds    INR 0.96 0.84 - 1.19       US right upper quadrant    Result Date: 9/22/2023  Narrative: RIGHT UPPER QUADRANT ULTRASOUND INDICATION:     CT finding N/V +Cancino. COMPARISON: CT abdomen/pelvis 9/21/2023 TECHNIQUE:   Real-time ultrasound of the right upper quadrant was performed with a curvilinear transducer with both volumetric sweeps and still imaging techniques. FINDINGS: Evaluation limited as per sonographer's note in PACS. PANCREAS:  Visualized portions of the pancreas are within normal limits. AORTA AND IVC:  Visualized portions are normal for patient age. LIVER: Size:  Within normal range. The liver measures 15.3 cm in the midclavicular line. Contour:  Surface contour is smooth. Parenchyma:  Echogenicity and echotexture are within normal limits. No liver mass identified. Limited imaging of the main portal vein shows it to be patent and hepatopetal. BILIARY: The gallbladder is normal in caliber. Gallbladder wall thickness upper limits of normal. No pericholecystic fluid. There is gallbladder sludge without evidence for shadowing calculi. No sonographic Cancino sign. No intrahepatic biliary dilatation. CBD measures 4.0 mm. No choledocholithiasis. KIDNEY: Right kidney measures 11.9 x 5.2 x 6.4 cm. Volume 207.6 mL Several simple cysts, the largest of which measures 4.1 cm. 2.8 cm septated cyst in the upper pole. No hydronephrosis or perinephric collection. ASCITES:   None. Impression: No cholelithiasis. Gallbladder sludge is present with wall thickness upper limits of normal. Negative sonographic Cancino sign. Findings may be sequela of chronic gallbladder dysfunction. Consider follow-up with a HIDA scan if it would alter patient management.  Workstation performed: [id number]     CT abdomen pelvis w contrast    Result Date: 9/21/2023  Narrative: CT ABDOMEN AND PELVIS WITH IV CONTRAST INDICATION:   Head/neck cancer, staging lymohoma staging prior to treatment with chemo. COMPARISON: 9/13/2023. TECHNIQUE:  CT examination of the abdomen and pelvis was performed. Multiplanar 2D reformatted images were created from the source data. This examination, like all CT scans performed in the [facility], was performed utilizing techniques to minimize radiation dose exposure, including the use of iterative reconstruction and automated exposure control. Radiation dose length product (DLP) for this visit:  813 mGy-cm IV Contrast:  100 mL of iohexol (OMNIPAQUE) Enteric Contrast:  Enteric contrast was not administered. FINDINGS: Mildly degraded by respiratory motion. ABDOMEN LOWER CHEST: There is bibasilar atelectasis. LIVER/BILIARY TREE:  Unremarkable. GALLBLADDER:  No calcified gallstones. There may be mild gallbladder wall thickening though evaluation is degraded by respiratory motion. No surrounding inflammatory changes. SPLEEN:  Unremarkable. PANCREAS:  Unremarkable. ADRENAL GLANDS:  Unremarkable. KIDNEYS/URETERS: Multiple bilateral renal cysts. No hydronephrosis. STOMACH AND BOWEL: There is colonic diverticulosis without evidence of acute diverticulitis. APPENDIX:  No findings to suggest appendicitis. ABDOMINOPELVIC CAVITY:  No ascites. No pneumoperitoneum. No lymphadenopathy. VESSELS:  Unremarkable for patient's age. PELVIS REPRODUCTIVE ORGANS:  Unremarkable for patient's age. URINARY BLADDER:  Unremarkable. ABDOMINAL WALL/INGUINAL REGIONS:  Fat containing right inguinal hernia noted. Fat-containing umbilical hernia. OSSEOUS STRUCTURES: Again seen is a destructive lytic lesion in the T12 vertebral body with sclerotic borders, progressed from 2013 and with chronic appearing pathologic fracture. Impression: 1. No abdominal lymphadenopathy.  2.  Question gallbladder wall thickening versus motion artifact. If there is clinical concern for gallbladder pathology, ultrasound is recommended. 3.  Chronic T12 lytic lesion with pathologic fracture. Workstation performed: [id number]     MRI soft tissue neck wo and w contrast    Result Date: 9/21/2023  Narrative: MRI OF THE SOFT TISSUES OF THE NECK - WITH AND WITHOUT CONTRAST INDICATION: Oropharyngeal mass s/p biopsy (+) for carcinoma COMPARISON: Neck CT from 9/14/2023 TECHNIQUE:  Multiplanar, multisequence imaging of the soft tissues of the neck was performed before and after gadolinium administration. . IV Contrast:  7.5 mL of Gadobutrol injection (SINGLE-DOSE) IMAGE QUALITY: Motion degrades images. FINDINGS: VISUALIZED BRAIN PARENCHYMA: No acute or suspicious findings. Please see today's brain MR report. VISUALIZED ORBITS AND PARANASAL SINUSES: Globes and orbits are within normal limits. Pan paranasal sinus mucosal thickening, greatest involving ethmoids and sphenoids. NASAL CAVITY, NASOPHARYNX, and OROHYPOPHARYNX and LARYNX: Approximately 7.5 x 4.0 x 7.8 cm (length X depth X width) soft tissue mass, epicenter likely the left lateral pharyngeal recess. Enhances and restricts diffusion. Central, irregular fluid signal intensity area without enhancement appears contiguous with fluid around an endotracheal tube, likely trapped secretions and circumferential mass. Mass extends from the left greater than right nasopharynx superiorly to the cricoid cartilage inferiorly. Extends to the posterior nasal cavity. Displaces the soft palate, uvula and tongue base anteriorly. Oral cavity is otherwise within normal limits. Displaces the left pterygoid muscles anterolaterally. Extends posterior to the angle of the mandible approximately 1.4 cm, abutting and mildly laterally displacing the deep parotid, inseparable from the gland. Effaces the left parapharyngeal fat. Discrete epiglottis not seen, grossly anteriorly displaced.  Extends along the left aryepiglottic fold and posterior pharyngeal wall to the to the inferior supraglottic airway, grossly terminating just above or at the left false cord. Extends to the left carotid space, encircles the carotid with severe narrowing and posterior-lateral displacement of the distal cervical and most proximal petrous vessel. Otherwise preserved left internal carotid flow-void. Posterior-lateral displacement  and occlusion of the internal jugular vein at the level of the angle of the mandible in the distal neck. Mass extends to the proximal jugular foramen. Laryngeal ventricles and true cords appear preserved. Normal fat signal  preserved in the cricoid and thyroid cartilages. Enteric and endotracheal tubes are displaced rightward. Trace retropharyngeal effusion. THYROID GLAND:   Within normal limits. PAROTID AND SUBMANDIBULAR GLANDS: Both submandibular and the right parotid glands are within normal limits. Deep left parotid involvement mentioned above. LYMPH NODES: Similar small jugular chain nodes more numerous on the left than the right, but none considered pathologically enlarged. 0.7 x 0.5 cm suspicious right retropharyngeal node (4/27). Left retropharyngeal space is obliterated by mass, no separate adenopathy. VASCULAR STRUCTURES: Left carotid a jugular involvement described above. Right carotid artery and jugular veins are within normal limits. THORACIC INLET: Similar to CT. Dependent lung opacities. CERVICAL SPINE: Normal appearance. OTHER: Left greater than right mastoid effusions with patchy left enhancement and restricted diffusion were described in today's brain MR report. Asymmetric opacity involves the left petrous apex. Normal appearance of the IACs and inner ear structures. No cerebellopontine angle mass. Impression: Known, large left neck mass described in detail above.  Workstation performed: [id number]     MRI brain w wo contrast    Result Date: 9/21/2023  Narrative: MRI BRAIN WITHOUT AND WITH CONTRAST INDICATION: Oropharyngeal mass s/p biopsy (+) for carcinoma . COMPARISON: 6/17/2017 CT of the brain TECHNIQUE:  Multiplanar/multisequence MRI of the brain was performed administration of contrast. IV Contrast:  7.5 mL of Gadobutrol injection (SINGLE-DOSE) IMAGE QUALITY:  Diagnostic. FINDINGS: BRAIN PARENCHYMA: No  hemorrhage, extra-axial fluid collection, acute infarct, mass effect or midline shift. Mild, focal patchy periventricular and subcortical white matter foci of abnormal T2 and FLAIR hyperintensity are nonspecific, but usually secondary to changes of microangiopathy. Small developmental venous angioma in the left posterior frontal lobe, typically clinically insignificant. No suspicious enhancement or masses. VENTRICLES:  Within normal limits for age. SELLA AND PITUITARY GLAND:  Within normal limits. ORBITS:  Within normal limits. PARANASAL SINUSES: Mucosal thickening. VASCULATURE: Preserved skull base flow voids. Normal enhancement. CALVARIUM AND SKULL BASE: Bilateral mastoid effusions are likely secondary mass, related to eustachian tube obstruction from nasopharyngeal mass. Patient is also intubated. Small mucosal retention cyst at the right fossa of Rosenmuller. Small ill-defined foci of enhancement in the superior mastoid and asymmetric left mastoid restricted diffusion. No coalescence or discrete mass. Skull and visualized cervical spine are within normal limits. EXTRACRANIAL SOFT TISSUES:  A nasopharyngeal mass will be described in further detail on today's neck MR report. Impression: No evidence of brain metastases. 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 Asymmetric patchy enhancement and restricted diffusion in the left mastoid. The differential includes neoplastic involvement and infection. Workstation performed: [id number]     XR chest portable    Result Date: 9/20/2023  Narrative: CHEST INDICATION:   NG tube placement.  COMPARISON: 9/17/2023 EXAM PERFORMED/VIEWS:  XR CHEST PORTABLE FINDINGS: Tracheostomy tube is in stable position. Distal portions of nasogastric tube are seen below the hemidiaphragm within the left upper abdomen. The tip of the tube extends beyond the inferior margin of this study. Heart shadow is enlarged but unchanged from prior exam. There is mild pulmonary vascular congestion. The left costophrenic angle is obscured. Hazy opacities are additionally noted within the right costophrenic angle. No evidence of acute osseous abnormality. Impression: 1. Nasogastric tube is seen with its distal portions within the stomach. The tip of the tube courses beyond the inferior margin of the study. 2. Pulmonary vascular congestion with obscuration of the left hemidiaphragm. This is stable from prior radiograph and may represent small left effusion versus consolidation. 3. Right basilar atelectasis versus trace right effusion. Workstation performed: [id number]     XR chest portable    Result Date: 9/18/2023  Narrative: CHEST INDICATION:   Assess. COMPARISON:  None EXAM PERFORMED/VIEWS:  XR CHEST PORTABLE Images: 2 FINDINGS: Tracheostomy tube is seen in appropriate position. A feeding tube is seen extending down below the dome of the diaphragm Cardiomegaly seen Increased lung markings seen suggest congestion left base is obscured Osseous structures appear within normal limits for patient age. Impression: Increased lung markings seen suggest congestion. The left base is obscured No worsening Workstation performed: [id number]     CT soft tissue neck w contrast    Result Date: 9/14/2023  Narrative: CT NECK WITH CONTRAST INDICATION:   Laryngeal edema COMPARISON:  None. TECHNIQUE:  Axial, sagittal, and coronal 2D reformatted images were created from the axial source data and submitted for interpretation. Radiation dose length product (DLP) for this visit:  769 mGy-cm .   This examination, like all CT scans performed in the [facility], was performed utilizing techniques to minimize radiation dose exposure, including the use of iterative reconstruction and automated exposure control. IV Contrast:  85 mL of iohexol (OMNIPAQUE) IMAGE QUALITY:  Diagnostic. FINDINGS: VISUALIZED BRAIN PARENCHYMA:  No acute intracranial pathology of the visualized brain parenchyma. VISUALIZED ORBITS: Normal visualized orbits. PARANASAL SINUSES: Normal visualized paranasal sinuses. Nasopharynx, oropharynx AND HYPOPHARYNX: There is a large heterogeneously enhancing mass identified within the neck involving multiple spaces. The origin is difficult to ascertain given the large size. This mass measures approximately 6.1 x 4.7 x 5.2 cm and appears to be centered within the left oropharynx. The mass extends superiorly into the nasopharynx left more so than right and into the posterior aspect of the nasal cavity. The mass also extends across the midline within the oropharynx and inferiorly into the left lateral oropharynx but not below the laryngeal ventricle. The mass extends laterally into the infratemporal fossa and parapharyngeal fat and is inseparable from infratemporal musculature and deep lobe of the parotid gland. There is extension into the prevertebral space where the mass is inseparable from the anterior paraspinal muscle on the left and posteriorly and laterally into the carotid space where the mass is displacing and inseparable from jugular and carotid. The mass encases the cervical internal carotid artery just below the level of the skull base resulting in narrowing of the vessel and the mass compresses and occludes the jugular vein below the skull base. The vessel does reconstitute via collateral vessels as it  extends inferiorly within the neck. The mass extends superiorly into the skull base inseparable from the carotid canal and jugular foramen. There is severe airway compromise within the posterior oral cavity and superior oropharynx.  ET tube and OG tube are present. THYROID GLAND:  Unremarkable. PAROTID AND SUBMANDIBULAR GLANDS: Normal parotid and submandibular glands other than the mass abutting the deep lobe of the left parotid gland. LYMPH NODES: Multiple small jugular chain nodes are seen on the left. VASCULAR STRUCTURES: As described above the mass partially encases the cervical internal carotid artery, narrowing the vessel and occludes the jugular vein from the skull base to the mid neck. Below this the vessel is unremarkable due to collateral reconstitution. THORACIC INLET: Posterior left upper lobe consolidation may represent atelectasis or pneumonia. Mild patchy groundglass opacity within the upper lung fields. BONY STRUCTURES: At T1-2 there is a left paramedian bridging osteophyte resulting in mild to moderate left anterior lateral canal stenosis. Impression: Large enhancing soft tissue mass identified within the left neck involving multiple spaces. This appears to be centered within the left oropharynx with extensions as described above into multiple spaces. This results in significant airway compromise. 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 Small level 3 and level 4 nodes are seen within the neck. I personally discussed this study with ICU resident on 9/14/2023 4:44 PM. Workstation performed: [id number]     Bronchoscopy    Result Date: 9/14/2023  Narrative: Table formatting from the original result was not included. [address] [place] [address] [phone] DATE OF SERVICE: 9/14/23 PHYSICIAN(S): Attending: No Staff Documented Fellow: No Staff Documented INDICATION: Acute respiratory failure with hypoxia ([address]) POST-OP DIAGNOSIS: See the impression below. PREPROCEDURE: Standard airway preparation completed per respiratory therapy protocol. Informed consent was obtained. Images reviewed prior to the procedure. A Time Out was performed.  No suspicion or identified risk for TB or other airborne infectious disease; bronchoscopy procedure being performed for diagnostic purposes. PROCEDURE: Bronchoscopy DETAILS OF PROCEDURE: Patient was taken to the procedure room where a time out was performed to confirm correct patient and correct procedure. The patient was already under sedation prior to the procedure. The patient's blood pressure, ECG, heart rate, level of consciousness, respirations and oxygen were monitored throughout the procedure. The patient experienced no blood loss. The scope was introduced through the endotracheal tube. The procedure was moderately difficult due to patient intolerance and persistent coughing. In response to procedure difficulty, deeper sedation was employed. The patient tolerated the procedure well. There were no apparent adverse events. ANESTHESIA INFORMATION: ASA: ASA status not filed in the log. Anesthesia Type: Anesthesia type not filed in the log.  FINDINGS: The lower trachea, main sujata, left main stem, KARTHIK, lingula, LLL, right main stem, RUL, bronchus intermedius, RML and RLL appeared normal. Left lower lung zone with no endobronchial lesions, left upper and lingula both appear normal Right upper, right middle and right lower lobes all appeared normal with no endobronchial lesions Endotracheal tube was retracted 3 cm under direct visualization with the bronchoscope Bronchoalveolar lavage was performed x1 in the right lateral segment (RB4) with 60 mL of saline instilled and a total return of 40 mL; the fluid appeared cloudy SPECIMENS: ID Type Source Tests Collected by Time Destination 1 :  Lavage Lung, Right Middle Lobe Bronchoalveolar Lavage PULMONARY CYTOLOGY Starla Lopez MD 9/14/2023 12:55 PM  A :  Bronchial Lung, Right Middle Lobe Bronchoalveolar Lavage FUNGAL CULTURE, VIRUS CULTURE, BRONCHIAL CULTURE AND GRAM STAIN, LEGIONELLA CULTURE, PNEUMOCYSTIS SMEAR BY DFA ([facility]), AFB CULTURE WITH STAIN Starla Lopez MD 9/14/2023 12:57 PM Impression: BAL of the right middle lobe Endotracheal tube was retracted under direct visualization Tongue still appears swollen, vocal cords visualized outside of the endotracheal tube with the bronchoscope, no significant edema or mass noted RECOMMENDATION:  Follow-up infectious work-up      XR chest 1 view portable    Result Date: 9/13/2023  Narrative: CHEST INDICATION:   post intubation. COMPARISON: Same day chest radiograph and subsequent same day CT chest. Chest x-ray 8/27/2023. EXAM PERFORMED/VIEWS:  XR CHEST PORTABLE FINDINGS: Endotracheal tube terminates approximately 4 cm above the sujata. Heart shadow is enlarged but unchanged from prior exam. Bibasilar airspace opacities are again demonstrated. No appreciable pneumothorax. No evidence of acute osseous abnormality. Lucent lesion within T12 is better demonstrated on same-day CT. Impression: 1. Endotracheal tube terminates 4 cm above the sujata. 2. Redemonstration of bibasilar airspace opacities. Workstation performed: [id number]     XR chest 1 view portable    Result Date: 9/13/2023  Narrative: CHEST INDICATION:   post intubation, adjusting ET Tube. COMPARISON:  None EXAM PERFORMED/VIEWS:  XR CHEST PORTABLE FINDINGS: Endotracheal tube terminates approximately 3 cm above the sujata. Cardiomediastinal silhouette appears unremarkable. Bibasilar airspace opacities are again noted. No appreciable pneumothorax or pleural effusion. No evidence of acute osseous abnormality. Lucent lesion within T12 is better demonstrated on same-day CT. Impression: 1. Endotracheal tube terminates 3.3 cm above the sujata. 2. Bibasilar airspace opacities are again demonstrated. Workstation performed: [id number]     XR chest portable    Result Date: 9/13/2023  Narrative: CHEST INDICATION:   sob. COMPARISON: 8/27/2023. Subsequent CT chest performed the same day.  EXAM PERFORMED/VIEWS:  XR CHEST PORTABLE FINDINGS: Heart shadow is enlarged but unchanged from prior exam. [place] opacities are noted within the right lung base, possibly atelectasis versus pneumonia. Left basilar opacity is similar to prior radiograph. No appreciable pneumothorax. No evidence of acute osseous abnormality. Cystic lesion within the T12 vertebral body is better characterized on same-day CT. Impression: 1. Hazy bibasilar airspace opacities which may represent atelectasis versus pneumonia. Left basilar opacity is similar to recent radiograph while right basilar opacity is new Workstation performed: [id number]     CTA ED chest PE Study    Result Date: 9/13/2023  Narrative: CTA - CHEST WITH IV CONTRAST - PULMONARY ANGIOGRAM INDICATION:   R/O PE. Reports shortness of breath since being discharged from the hospital 3 weeks ago for pneumonia. Fatigue. Productive cough with white sputum. Angioedema. COMPARISON: Chest radiograph 9/13/2023, chest CT 8/25/2023, thoracic spine CT 11/13/2013. TECHNIQUE: CT angiogram timed for optimal opacification of the pulmonary arteries. Axial, sagittal, and coronal 2D reformats created from source data. Coronal 3D MIP postprocessing on the acquisition scanner. Radiation dose length product (DLP):  472 mGy-cm . Radiation dose exposure minimized using iterative reconstruction and automated exposure control. IV Contrast:  85 mL of iohexol (OMNIPAQUE) FINDINGS: PULMONARY ARTERIES:  No pulmonary embolus. Moderate pulmonary artery enlargement. LUNGS: Mild patchy new consolidation in the medial segment right middle lobe. Improving opacity in the left upper lobe with mild residual atelectasis/scar. Redemonstration of mild dependent atelectasis in both lower lobes. Severe emphysema. AIRWAYS: No significant filling defects. ET tube 4 cm above the sujata. PLEURA:  Unremarkable. HEART/GREAT VESSELS: Moderate cardiomegaly. MEDIASTINUM AND PRASANTH: Slight regression of hilar and mediastinal lymphadenopathy.  The largest node was previously 2.5 x 2.0 cm in the right infrahilar region and is now 1.9 x 1.3 cm (501/94). CHEST WALL AND LOWER NECK: Unremarkable. UPPER ABDOMEN: Right renal cysts. OSSEOUS STRUCTURES: Redemonstration of the large cystic lesion with sclerotic margins in T12 with destruction of the superior and inferior endplates, present as a much smaller thin-walled cystic lesion on the thoracic spine CT from 2013, imaged on a thoracic spine MRI from 2020. Impression: No pulmonary embolus. Patchy consolidation right middle lobe, new from 8/25/2023, compatible with pneumonia. Slight regression of previous hilar and mediastinal lymphadenopathy. Improving left upper lobe opacity which may have been due to pneumonia with residual atelectasis/scar. Persistent partial atelectasis of the lower lobes. 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 Workstation performed: [id number]     Echo complete w/ contrast if indicated    Result Date: 8/28/2023  Narrative: •  Left Ventricle: Left ventricular cavity size is small. Wall thickness is increased. There is severe concentric hypertrophy. The left ventricular ejection fraction is 60%. Systolic function is normal. Wall motion is normal. Diastolic function is mildly abnormal, consistent with grade I (abnormal) relaxation. There is no LV dynamic obstruction at rest. •  Right Ventricle: Right ventricular cavity size is normal. Systolic function is normal. •  Left Atrium: The atrium is mildly dilated. •  Mitral Valve: There is mild to moderate regurgitation. There is systolic anterior motion of the chordal apparatus with late peaking gradient 29 mmHg during Valsalva maneuver. •  Pericardium: There is a trivial pericardial effusion along the right atrial free wall. XR chest portable ICU    Result Date: 8/28/2023  Narrative: CHEST INDICATION:   Acute hypoxic respiratory failure.  COMPARISON: 8/25/2023 EXAM PERFORMED/VIEWS:  XR CHEST PORTABLE ICU FINDINGS: Heart shadow is enlarged but unchanged from prior exam. There is stable left-sided volume loss secondary to left basilar atelectasis. No pneumothorax or pleural effusion. Osseous structures appear within normal limits for patient age. Impression: Stable volume loss on the left secondary to left lower lobe atelectasis. Workstation performed: [id number]     VAS lower limb venous duplex study, complete bilateral    Result Date: 8/27/2023  Narrative:  [facility] REPORT CLINICAL: Indications: Patient presents with bilateral lower extremity pain x 1 days. Operative History: cardiac surgery-date unknown. Risk Factors The patient has history of HTN and CKD. She has no history of Hyperlipidemia. CONCLUSION:  Impression: RIGHT LOWER LIMB: No evidence of acute or chronic deep vein thrombosis. No evidence of superficial thrombophlebitis noted. Doppler evaluation shows a normal response to augmentation maneuvers. . Popliteal, posterior tibial and anterior tibial arterial Doppler waveform's are triphasic. LEFT LOWER LIMB: No evidence of acute or chronic deep vein thrombosis. No evidence of superficial thrombophlebitis noted. Doppler evaluation shows a normal response to augmentation maneuvers. Popliteal, posterior tibial and anterior tibial arterial Doppler waveform's are triphasic. SIGNATURE: Electronically Signed by: Mo Pang on 2023-08-27 08:12:52 PM    CTA ED chest PE Study    Result Date: 8/25/2023  Narrative: CTA - CHEST WITH IV CONTRAST - PULMONARY ANGIOGRAM INDICATION:   Increased SOB, recent COVID diagnosis. COMPARISON: MRI from 3/18/2020 as well as bone scan from 7/2/2019 and thoracic spine from 11/13/2013 TECHNIQUE: CTA examination of the chest was performed using angiographic technique according to a protocol specifically tailored to evaluate for pulmonary embolism. Multiplanar 2D reformatted images were created from the source data.  In addition, coronal 3D MIP postprocessing was performed on the acquisition scanner. The study is limited by some respiratory motion artifacts especially in the lower lobes on the left where there is crowding of vessels due to atelectatic changes. Radiation dose length product (DLP) for this visit:  370 mGy-cm . This examination, like all CT scans performed in the [facility], was performed utilizing techniques to minimize radiation dose exposure, including the use of iterative reconstruction and automated exposure control. IV Contrast:  80 mL of iohexol (OMNIPAQUE) FINDINGS: PULMONARY ARTERIAL TREE: Limited visualization left lower lobe however there is suspicion for a small embolus in the posterobasal segment on axial image 139, series 305 and coronal image 142. No definite filling defect is seen elsewhere. The main pulmonary artery is significantly dilated at 4.2 cm. The RV LV ratio is elevated at 1.1 cm. The primary pulmonary vascular stent minutes are also dilated chronicity is uncertain. LUNGS: Emphysema is noted with blebs at the apices. There is peripheral consolidation in the left upper lobe. Air bronchogram is not well seen and there is mucous occlusion of the left mainstem bronchus with volume loss of the left upper lobe as well as  left lower lobe to a lesser extent. Some aeration in the left lower lobe is still visible. PLEURA:  Unremarkable. HEART/GREAT VESSELS:  Unremarkable for patient's age. No thoracic aortic aneurysm. MEDIASTINUM AND PRASANTH: 10 mm pretracheal node is identified. 9 mm superior mediastinal node is identified. 10 mm prevascular node is identified. Subcarinal node measures 1.5 cm. Hilar adenopathy is also demonstrated. Right hilar node is larger measuring 1.8 cm in short axis on image 138. CHEST WALL AND LOWER NECK:   Unremarkable. VISUALIZED STRUCTURES IN THE UPPER ABDOMEN: Low-density cyst is seen in the right kidney. . OSSEOUS STRUCTURES: There appears to be a destructive lesion involving the T12 vertebral body eroding both endplates and much of the vertebral body this does not appear to represent a Schmorl's node. A cystic lesion was noted in 2013 at this location however the current lesion is larger with loss of endplates. MRI also imaged this lesion in 2020 the lesion appears somewhat larger on the current examination however. Impression: Limited study. There is equivocal embolus in the posterior basal segment left lower lobe. Other smaller segments are difficult to assess due to motion and vascular crowding. There is mucous plugging in the left mainstem bronchus with volume loss in portions of the left lower lobe and left upper lobe. Aspiration pneumonia is not excluded. There is dilatation of the main pulmonary artery and proximal pulmonary segment suggesting pulmonary hypertension this is more likely chronic than acute given the degree of distention. Emphysema is present. There is significant mediastinal and hilar adenopathy suggesting underlying neoplasm more likely than simple reactive nodes. Enlarging lesion at T12 is again demonstrated of uncertain etiology. This has been present since 2013. Perhaps a plasmacytoma is a consideration. Neoplastic work-up is advised. Pulmonology consultation is also advised to assess endobronchial obstruction on the left. This was discussed with resident physician Dr. Josef Aguilera at 4:58 p.m. Follow-up was marked in the epic system Workstation performed: [id number]     XR chest 1 view portable    Result Date: 8/25/2023  Narrative: CHEST INDICATION:   SOB. COMPARISON: 8/21/2021 EXAM PERFORMED/VIEWS:  XR CHEST PORTABLE Single view FINDINGS: Development of CHF. Probable left basal infiltrate. Cardiomediastinal silhouette has become more enlarged. No pneumothorax or pleural effusion. Osseous structures appear within normal limits for patient age. Impression: Increased cardiomegaly. Development of CHF.  Probable left basal infiltrate Workstation performed: [id number]       I have personally reviewed labs, imaging studies, and pertinent reports. This note has been generated by voice recognition software system. Therefore, there may be spelling, grammar, and or syntax errors. Please contact if questions arise.      Violeta Marrero,    Hematology and Medical Oncology - PGY [place]

## 2023-09-27 NOTE — ASSESSMENT & PLAN NOTE
• Biopsy on 9/17: Large B-cell lymphoma, germinal center B-cell type, c-MYC and BCL-2 double expression. Ki-67 proliferation index is up to 90%; FISH & NGS pending. • Staging work-up: Currently stage II. • First inpatient chemotherapy 9/22/2023- with R-EPOCH.     Plan:  • Inpatient hematology oncology following. Recommendation appreciated. • Outpatient follow-up with hematology oncology.   • Close TLS workup- uric acid, electrolytes  • Allopurinol as prophylaxis  • PEG tube in place

## 2023-09-27 NOTE — BRIEF OP NOTE (RAD/CATH)
INTERVENTIONAL RADIOLOGY PROCEDURE NOTE    Date: 9/27/2023    Procedure:   Procedure Summary     Date: 09/27/23 Room / Location: [address] [place] [address] Interventional Radiology    Anesthesia Start: 9577 Anesthesia Stop:     Procedure: IR GASTROSTOMY TUBE PLACEMENT Diagnosis: (Failed swallow study, needs enteral access)    Scheduled Providers:  Responsible Provider: Vincent Tobias DO    Anesthesia Type: IV sedation with anesthesia ASA Status: 4          Preoperative diagnosis:   1. Large cell lymphoma ([address])    2. Angioedema, subsequent encounter    3. Acute respiratory failure with hypoxia (HCC)    4. PNA (pneumonia)    5. Oropharyngeal mass    6. Pneumonia of right middle lobe due to infectious organism    7. RML pneumonia    8. Chemotherapy induced neutropenia (HCC)    9. Chemotherapy induced neutropenia (HCC)         Postoperative diagnosis: Same. Surgeon: Jaswinder Moise MD     Assistant: None. No qualified resident was available. Blood loss: Minimal    Specimens: None     Findings: 16 F G-tube placed. Complications: None immediate.     Anesthesia: MAC sedation

## 2023-09-27 NOTE — ANESTHESIA PREPROCEDURE EVALUATION
Procedure:  IR GASTROSTOMY TUBE PLACEMENT    Relevant Problems   ANESTHESIA (within normal limits)      CARDIO   (+) Benign essential hypertension   (+) Hyperlipidemia      /RENAL   (+) CKD (chronic kidney disease) stage 3, GFR 30-59 ml/min (HCC)      HEMATOLOGY   (+) Large cell lymphoma (HCC)      MUSCULOSKELETAL   (+) Disc degeneration, lumbar   (+) Myofascial pain syndrome   (+) Osteoarthritis of spine with radiculopathy, lumbar region      NEURO/PSYCH   (+) Myofascial pain syndrome   (+) Pain syndrome, chronic      PULMONARY   (+) Acute respiratory failure with hypoxia secondary to oropharyngeal mass   (+) COPD without exacerbation (HCC) (Resolved)      Cardiovascular and Mediastinum   (+) (HFpEF) heart failure with preserved ejection fraction (HCC)      Respiratory   (+) Oropharyngeal mass        TTE 8/28/2023  •  Left Ventricle: Left ventricular cavity size is small. Wall thickness is increased. There is severe concentric hypertrophy. The left ventricular ejection fraction is 60%. Systolic function is normal. Wall motion is normal. Diastolic function is mildly abnormal, consistent with grade I (abnormal) relaxation. There is no LV dynamic obstruction at rest.  •  Right Ventricle: Right ventricular cavity size is normal. Systolic function is normal.  •  Left Atrium: The atrium is mildly dilated. •  Mitral Valve: There is mild to moderate regurgitation. There is systolic anterior motion of the chordal apparatus with late peaking gradient 29 mmHg during Valsalva maneuver. •  Pericardium: There is a trivial pericardial effusion along the right atrial free wall.       Physical Exam    Airway  Comment: Uncuffed trach in place  Mallampati score: II  TM Distance: >3 FB  Neck ROM: full     Dental   upper dentures and lower dentures,     Cardiovascular      Pulmonary      Other Findings        Anesthesia Plan  ASA Score- 4     Anesthesia Type- IV sedation with anesthesia with ASA Monitors.          Additional Monitors:   Airway Plan:           Plan Factors-    Chart reviewed. EKG reviewed. Existing labs reviewed. Patient summary reviewed. Patient is a current smoker. Induction- intravenous. Postoperative Plan- Plan for postoperative opioid use. Informed Consent- Anesthetic plan and risks discussed with patient. I personally reviewed this patient with the CRNA. Discussed and agreed on the Anesthesia Plan with the CRNA. Vira Haile

## 2023-09-27 NOTE — ASSESSMENT & PLAN NOTE
Lab Results   Component Value Date     CHOLESTEROL 203 (H) 01/24/2023     CHOLESTEROL 177 08/11/2022     CHOLESTEROL 184 07/08/2020            Lab Results   Component Value Date     HDL 45 (L) 01/24/2023     HDL 37 (L) 08/11/2022     HDL 44 (L) 07/08/2020            Lab Results   Component Value Date     TRIG 166 (H) 09/16/2023     TRIG 160 (H) 01/24/2023     TRIG 108 08/11/2022            Lab Results   Component Value Date     NONHDLC 146 07/12/2018     NONHDLC 207 (H) 04/29/2013      Continue outpatient diet/ lifestyle modification

## 2023-09-27 NOTE — DISCHARGE INSTRUCTIONS
How to Care for Your G Tube     AMBULATORY CARE:   A  gastrostomy (G) tube is a plastic tube that is put into your stomach through your skin. G tubes are most often used to give you food or liquids if you are not able to eat or drink. Medical formula, medicines, and water can be given through a G tube. After your procedure you may resume your regular diet. Start with clear liquids and advance as tolerated. Small sips of flat soda will help with nausea. Your tube can be used immediately. Contact Interventional Radiology at [phone] Dorie PATIENTS: Contact Interventional Radiology at [phone]) Mauropriyanka Eduard PATIENTS: Contact Interventional Radiology at [phone]) if any of the following occur: You have severe abdominal pain. You have persistent nausea or vomiting. Blood or tube feeding fluid leaks from the G tube site. Your G tube is shorter than it was when it was put in. Your G tube comes out. You have discomfort or pain around your PEG tube site. The skin around your G tube is red, swollen, or draining pus. Your T tacks do not fall off. T tacks should fall off within four weeks of the peg tube placement. How to use your G tube: Your healthcare provider will tell you when and how often to use your PEG tube for feedings. How to care for the skin around your G tube:   Do not remove the T tacks they will fall off in 3 weeks time, they hold your G tube in place when you first get it. Leave clean bandages over the tube area for the first 24 hours after the tube is put in. You may not need to use bandages after 24 hours if the skin around the tube looks dry. Ask when you can shower or bathe. Routine skin care:    Clean the skin around your tube 1 to 2 times each day. Ask your healthcare provider what you should use to clean your skin. Check for redness and swelling in the area where the tube goes into your body.  Check for fluid draining from your stoma (the hole where the tube was put in). Keep the skin around your G tube dry. This will help prevent skin irritation and infection. Tunneled central line placement   WHAT YOU NEED TO KNOW:   A central line is a catheter placed through a vein into or near your right atrium. Your right atrium is the right upper chamber of your heart. After your procedure, you will have some pain and swelling on your chest and neck. You may have some bruises on your chest and neck. You may also have 2 dressings, one on your chest and one on your neck. DISCHARGE INSTRUCTIONS:   Call 911 for any of the following: You feel lightheaded, short of breath, and have chest pain. Your catheter comes out   Contact Interventional Radiology at [phone] Dorie PATIENTS: Contact Interventional Radiology at [phone]) Joel Vivienne PATIENTS: Contact Interventional Radiology at [phone]) if:  Blood soaks through your bandage. You have new swelling in your arm, neck, face, or chest on your right side. Your catheter gets wet. Your bruises or pain get worse. You have a fever or chills. Persistent nausea or vomiting. Your incision is red, swollen, or draining pus. You have questions or concerns about your condition or care. Self-care:       Resume your normal diet. Keep your dressings dry. Do not take a shower or swim. You may take a tub bath, but do not get your dressings wet. Water in your wound can cause bacteria to grow and cause an infection. If your dressing gets wet, dry it off and cover it with dry sterile gauze. Call your healthcare provider. Do not use soaps or ointments. Do not change your dressings. Your healthcare provider will change your dressings. Your dressings should stay in place until your healthcare provider removes them. The dressing on your chest will stay as long as you have the catheter in place. The dressing prevents infection. Do not remove the red and blue caps from the end of your catheter.  The caps prevent air from getting into your catheter. Follow up with your healthcare provider as directed: Write down your questions so you remember to ask them during your visits.

## 2023-09-27 NOTE — ASSESSMENT & PLAN NOTE
• 9/14 Neck/ soft tissue CT: Large enhancing soft tissue mass identified within the left neck involving multiple spaces. This appears to be centered within the left oropharynx with extensions as described above into multiple spaces. This results in significant airway compromise. 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 Small level 3 and level 4 nodes are seen within the neck. • Tracheostomy by ENT, bx & addition testing performed  • Bx: Positive for malignancy, favor poorly differentiated carcinoma. Cannot entirely exclude a high grade large cell lymphoma. Portion of specimen submitted for flow cytometry. • H/o tobacco abuse, daily smoker. • Daughter was updated at bedside. • Preliminary biopsy: Large B-cell lymphoma, germinal center B-cell type, c-MYC and BCL-2 double expression.    • First chemotherapy on 9/22.     Plan:  • ENT/ Med onc following, appreciated  • Follow final biopsy report  • Tracheal cuff in place, PEG tube in place

## 2023-09-27 NOTE — ASSESSMENT & PLAN NOTE
• Well controlled at home. • Home meds: Amlodipine 10 mg daily, Coreg 25 mg twice daily, lisinopril 10 mg daily. • Elevated BP may second to anxiety from chemotherapy/new diagnosis lymphoma, chronic pain.     Plan:  • Coreg 12.5  • Amlodipine 10 mg daily. •  Lisinopril 40 mg daily. • PRN hydralazine if BP >160.   • Consider increasing PO regimen given persistent hype

## 2023-09-27 NOTE — ASSESSMENT & PLAN NOTE
• POA with O2 saturate around 80% on room air.  Needed high flow 60 L to bring up her O2 saturation to 95%. • Recent hospitalization for respiratory failure second to pneumonia. • CTA PE study negative for PE.  New onset RML PNA. • Persistent partial atelectasis of the lower lobes noted. • Current daily smoker.        Plan:  • Tracheostomy 9/17. Tracheal cuff in place  • Completed Decadron. • Atrovent/Xopenex  • Airway clearance protocol.  IS.  • Continue 3% saline nebs

## 2023-09-27 NOTE — SOCIAL WORK
Palliative LSW saw patient at the bedside today. LSW appreciates the opportunity to provide patient/family with inpatient emotional support and guidance while patient continues to receive medical attention from the medical team.     Topics discussed: Met with pt at bedside for continued support. Pt in good spirits today during visit. She denies any pain or other discomfort. She feels her secretions have since improved. Pt receiving chemo today and is anticipating she will have PEG placed later today as well. She is finding comfort in her rachel and has been enjoying reading her Bible and prayer book. Pt continues to be very well-supported by her family. Her dtr continues to visit with her on a daily basis. Pt denies any additional questions/needs at this time. Areas that need follow-up: Emotional Support  Resources given: None  Others present: Bedside RN      I have spent 20 minutes with Patient today in which greater than 50% of this time was spent in counseling/coordination of care regarding Counseling / Coordination of care.      LSW will continue to follow as requested by the medical team, patient, or family

## 2023-09-28 LAB
ALBUMIN SERPL BCP-MCNC: 2.8 G/DL (ref 3.5–5)
ALP SERPL-CCNC: 40 U/L (ref 34–104)
ALT SERPL W P-5'-P-CCNC: 23 U/L (ref 7–52)
ANION GAP SERPL CALCULATED.3IONS-SCNC: 6 MMOL/L
ARTERIAL PATENCY WRIST A: YES
AST SERPL W P-5'-P-CCNC: 14 U/L (ref 13–39)
BASE EXCESS BLDA CALC-SCNC: 7.6 MMOL/L
BASOPHILS # BLD MANUAL: 0 THOUSAND/UL (ref 0–0.1)
BASOPHILS NFR MAR MANUAL: 0 % (ref 0–1)
BILIRUB SERPL-MCNC: 0.42 MG/DL (ref 0.2–1)
BUN SERPL-MCNC: 21 MG/DL (ref 5–25)
CALCIUM ALBUM COR SERPL-MCNC: 10.1 MG/DL (ref 8.3–10.1)
CALCIUM SERPL-MCNC: 9.1 MG/DL (ref 8.4–10.2)
CHLORIDE SERPL-SCNC: 96 MMOL/L (ref 96–108)
CO2 SERPL-SCNC: 33 MMOL/L (ref 21–32)
CREAT SERPL-MCNC: 0.78 MG/DL (ref 0.6–1.3)
EOSINOPHIL # BLD MANUAL: 0 THOUSAND/UL (ref 0–0.4)
EOSINOPHIL NFR BLD MANUAL: 0 % (ref 0–6)
ERYTHROCYTE [DISTWIDTH] IN BLOOD BY AUTOMATED COUNT: 14.6 % (ref 11.6–15.1)
GFR SERPL CREATININE-BSD FRML MDRD: 77 ML/MIN/1.73SQ M
GLUCOSE SERPL-MCNC: 97 MG/DL (ref 65–140)
HCO3 BLDA-SCNC: 32.5 MMOL/L (ref 22–28)
HCT VFR BLD AUTO: 34.8 % (ref 34.8–46.1)
HGB BLD-MCNC: 11.4 G/DL (ref 11.5–15.4)
LYMPHOCYTES # BLD AUTO: 0.56 THOUSAND/UL (ref 0.6–4.47)
LYMPHOCYTES # BLD AUTO: 7 % (ref 14–44)
MAGNESIUM SERPL-MCNC: 1.9 MG/DL (ref 1.9–2.7)
MCH RBC QN AUTO: 31.1 PG (ref 26.8–34.3)
MCHC RBC AUTO-ENTMCNC: 32.8 G/DL (ref 31.4–37.4)
MCV RBC AUTO: 95 FL (ref 82–98)
MONOCYTES # BLD AUTO: 0 THOUSAND/UL (ref 0–1.22)
MONOCYTES NFR BLD: 0 % (ref 4–12)
NEUTROPHILS # BLD MANUAL: 7.45 THOUSAND/UL (ref 1.85–7.62)
NEUTS SEG NFR BLD AUTO: 93 % (ref 43–75)
NON VENT ROOM AIR: 100 %
O2 CT BLDA-SCNC: 16.6 ML/DL (ref 16–23)
OXYHGB MFR BLDA: 94.2 % (ref 94–97)
PCO2 BLDA: 46.9 MM HG (ref 36–44)
PH BLDA: 7.46 [PH] (ref 7.35–7.45)
PHOSPHATE SERPL-MCNC: 3.4 MG/DL (ref 2.3–4.1)
PLATELET # BLD AUTO: 158 THOUSANDS/UL (ref 149–390)
PLATELET BLD QL SMEAR: ADEQUATE
PMV BLD AUTO: 9.7 FL (ref 8.9–12.7)
PO2 BLDA: 75 MM HG (ref 75–129)
POTASSIUM SERPL-SCNC: 3.2 MMOL/L (ref 3.5–5.3)
PROT SERPL-MCNC: 5.4 G/DL (ref 6.4–8.4)
RBC # BLD AUTO: 3.67 MILLION/UL (ref 3.81–5.12)
RBC MORPH BLD: NORMAL
SODIUM SERPL-SCNC: 135 MMOL/L (ref 135–147)
SPECIMEN SOURCE: ABNORMAL
URATE SERPL-MCNC: 2.5 MG/DL (ref 2–7.5)
WBC # BLD AUTO: 8.01 THOUSAND/UL (ref 4.31–10.16)

## 2023-09-28 PROCEDURE — 84550 ASSAY OF BLOOD/URIC ACID: CPT

## 2023-09-28 PROCEDURE — 83735 ASSAY OF MAGNESIUM: CPT

## 2023-09-28 PROCEDURE — 99232 SBSQ HOSP IP/OBS MODERATE 35: CPT | Performed by: INTERNAL MEDICINE

## 2023-09-28 PROCEDURE — 85007 BL SMEAR W/DIFF WBC COUNT: CPT

## 2023-09-28 PROCEDURE — 84100 ASSAY OF PHOSPHORUS: CPT

## 2023-09-28 PROCEDURE — 80053 COMPREHEN METABOLIC PANEL: CPT

## 2023-09-28 PROCEDURE — 82805 BLOOD GASES W/O2 SATURATION: CPT

## 2023-09-28 PROCEDURE — 94760 N-INVAS EAR/PLS OXIMETRY 1: CPT

## 2023-09-28 PROCEDURE — 94668 MNPJ CHEST WALL SBSQ: CPT

## 2023-09-28 PROCEDURE — 94003 VENT MGMT INPAT SUBQ DAY: CPT

## 2023-09-28 PROCEDURE — 94669 MECHANICAL CHEST WALL OSCILL: CPT

## 2023-09-28 PROCEDURE — 36600 WITHDRAWAL OF ARTERIAL BLOOD: CPT

## 2023-09-28 PROCEDURE — 85027 COMPLETE CBC AUTOMATED: CPT

## 2023-09-28 PROCEDURE — 94640 AIRWAY INHALATION TREATMENT: CPT

## 2023-09-28 RX ORDER — FUROSEMIDE 10 MG/ML
40 INJECTION INTRAMUSCULAR; INTRAVENOUS ONCE
Status: COMPLETED | OUTPATIENT
Start: 2023-09-28 | End: 2023-09-28

## 2023-09-28 RX ORDER — POTASSIUM CHLORIDE 29.8 MG/ML
40 INJECTION INTRAVENOUS ONCE
Status: COMPLETED | OUTPATIENT
Start: 2023-09-28 | End: 2023-09-28

## 2023-09-28 RX ADMIN — ALLOPURINOL 300 MG: 100 TABLET ORAL at 08:02

## 2023-09-28 RX ADMIN — NYSTATIN 500000 UNITS: 100000 SUSPENSION ORAL at 21:22

## 2023-09-28 RX ADMIN — GUAIFENESIN 200 MG: 200 SOLUTION ORAL at 08:01

## 2023-09-28 RX ADMIN — FAMOTIDINE 20 MG: 20 TABLET, FILM COATED ORAL at 17:27

## 2023-09-28 RX ADMIN — NICOTINE 21 MG: 21 PATCH, EXTENDED RELEASE TRANSDERMAL at 08:01

## 2023-09-28 RX ADMIN — BUSPIRONE HYDROCHLORIDE 30 MG: 10 TABLET ORAL at 20:46

## 2023-09-28 RX ADMIN — OXYCODONE HYDROCHLORIDE 5 MG: 5 SOLUTION ORAL at 21:22

## 2023-09-28 RX ADMIN — GUAIFENESIN 200 MG: 200 SOLUTION ORAL at 13:29

## 2023-09-28 RX ADMIN — NYSTATIN 500000 UNITS: 100000 SUSPENSION ORAL at 11:38

## 2023-09-28 RX ADMIN — FLUCONAZOLE 400 MG: 100 TABLET ORAL at 08:03

## 2023-09-28 RX ADMIN — SERTRALINE HYDROCHLORIDE 50 MG: 50 TABLET ORAL at 08:04

## 2023-09-28 RX ADMIN — FILGRASTIM-AAFI 300 MCG: 300 INJECTION, SOLUTION SUBCUTANEOUS at 13:21

## 2023-09-28 RX ADMIN — GABAPENTIN 300 MG: 300 CAPSULE ORAL at 20:38

## 2023-09-28 RX ADMIN — LEVALBUTEROL HYDROCHLORIDE 1.25 MG: 1.25 SOLUTION RESPIRATORY (INHALATION) at 07:15

## 2023-09-28 RX ADMIN — IPRATROPIUM BROMIDE 0.5 MG: 0.5 SOLUTION RESPIRATORY (INHALATION) at 19:12

## 2023-09-28 RX ADMIN — LEVALBUTEROL HYDROCHLORIDE 1.25 MG: 1.25 SOLUTION RESPIRATORY (INHALATION) at 01:06

## 2023-09-28 RX ADMIN — LEVALBUTEROL HYDROCHLORIDE 1.25 MG: 1.25 SOLUTION RESPIRATORY (INHALATION) at 19:12

## 2023-09-28 RX ADMIN — CARVEDILOL 12.5 MG: 12.5 TABLET, FILM COATED ORAL at 08:04

## 2023-09-28 RX ADMIN — CHLORHEXIDINE GLUCONATE 15 ML: 1.2 SOLUTION ORAL at 20:38

## 2023-09-28 RX ADMIN — POTASSIUM CHLORIDE 40 MEQ: 29.8 INJECTION, SOLUTION INTRAVENOUS at 08:08

## 2023-09-28 RX ADMIN — OXYCODONE HYDROCHLORIDE 5 MG: 5 SOLUTION ORAL at 13:29

## 2023-09-28 RX ADMIN — GABAPENTIN 300 MG: 300 CAPSULE ORAL at 08:04

## 2023-09-28 RX ADMIN — NYSTATIN 500000 UNITS: 100000 SUSPENSION ORAL at 08:01

## 2023-09-28 RX ADMIN — OXYCODONE HYDROCHLORIDE 5 MG: 5 SOLUTION ORAL at 04:07

## 2023-09-28 RX ADMIN — IPRATROPIUM BROMIDE 0.5 MG: 0.5 SOLUTION RESPIRATORY (INHALATION) at 13:57

## 2023-09-28 RX ADMIN — SODIUM CHLORIDE SOLN NEBU 3% 4 ML: 3 NEBU SOLN at 07:15

## 2023-09-28 RX ADMIN — GABAPENTIN 300 MG: 300 CAPSULE ORAL at 17:28

## 2023-09-28 RX ADMIN — AMLODIPINE BESYLATE 10 MG: 5 TABLET ORAL at 08:02

## 2023-09-28 RX ADMIN — IPRATROPIUM BROMIDE 0.5 MG: 0.5 SOLUTION RESPIRATORY (INHALATION) at 01:06

## 2023-09-28 RX ADMIN — GUAIFENESIN 200 MG: 200 SOLUTION ORAL at 20:38

## 2023-09-28 RX ADMIN — CARVEDILOL 12.5 MG: 12.5 TABLET, FILM COATED ORAL at 17:28

## 2023-09-28 RX ADMIN — ACETAMINOPHEN 650 MG: 325 TABLET, FILM COATED ORAL at 13:29

## 2023-09-28 RX ADMIN — ENOXAPARIN SODIUM 40 MG: 40 INJECTION SUBCUTANEOUS at 08:01

## 2023-09-28 RX ADMIN — CHLORHEXIDINE GLUCONATE 15 ML: 1.2 SOLUTION ORAL at 08:01

## 2023-09-28 RX ADMIN — NYSTATIN 500000 UNITS: 100000 SUSPENSION ORAL at 17:27

## 2023-09-28 RX ADMIN — LISINOPRIL 40 MG: 20 TABLET ORAL at 08:04

## 2023-09-28 RX ADMIN — FAMOTIDINE 20 MG: 20 TABLET, FILM COATED ORAL at 08:04

## 2023-09-28 RX ADMIN — GUAIFENESIN 200 MG: 200 SOLUTION ORAL at 01:34

## 2023-09-28 RX ADMIN — OXYCODONE HYDROCHLORIDE 5 MG: 5 SOLUTION ORAL at 06:06

## 2023-09-28 RX ADMIN — SODIUM CHLORIDE SOLN NEBU 3% 4 ML: 3 NEBU SOLN at 19:12

## 2023-09-28 RX ADMIN — ACYCLOVIR 400 MG: 200 CAPSULE ORAL at 17:27

## 2023-09-28 RX ADMIN — FUROSEMIDE 40 MG: 10 INJECTION, SOLUTION INTRAMUSCULAR; INTRAVENOUS at 01:34

## 2023-09-28 RX ADMIN — ACYCLOVIR 400 MG: 200 CAPSULE ORAL at 08:12

## 2023-09-28 RX ADMIN — Medication 8.8 MG: at 21:23

## 2023-09-28 RX ADMIN — SODIUM CHLORIDE SOLN NEBU 3% 4 ML: 3 NEBU SOLN at 01:06

## 2023-09-28 RX ADMIN — BUSPIRONE HYDROCHLORIDE 30 MG: 10 TABLET ORAL at 08:07

## 2023-09-28 RX ADMIN — IPRATROPIUM BROMIDE 0.5 MG: 0.5 SOLUTION RESPIRATORY (INHALATION) at 07:15

## 2023-09-28 RX ADMIN — LEVOFLOXACIN 500 MG: 250 TABLET, FILM COATED ORAL at 08:04

## 2023-09-28 RX ADMIN — BUSPIRONE HYDROCHLORIDE 30 MG: 10 TABLET ORAL at 17:29

## 2023-09-28 RX ADMIN — SODIUM CHLORIDE SOLN NEBU 3% 4 ML: 3 NEBU SOLN at 13:57

## 2023-09-28 RX ADMIN — LEVALBUTEROL HYDROCHLORIDE 1.25 MG: 1.25 SOLUTION RESPIRATORY (INHALATION) at 13:57

## 2023-09-28 NOTE — PROGRESS NOTES
[address] [facility]  Progress Note  Name: Nannie Gosselin  MRN: [id number]  Unit/Bed#: ICU 03 I Date of Admission: 9/13/2023   Date of Service: 9/28/2023 I Hospital Day: 15    Assessment/Plan   * Acute respiratory failure with hypoxia secondary to oropharyngeal mass  Assessment & Plan  • POA with O2 saturate around 80% on room air.  Needed high flow 60 L to bring up her O2 saturation to 95%. • Recent hospitalization for respiratory failure second to pneumonia. • CTA PE study negative for PE.  New onset RML PNA. • Persistent partial atelectasis of the lower lobes noted. • Current daily smoker. • Patient had an event of desaturation last night with her oxygen level falling in high 70s and low 80s, patient was assessed by the night team and was given Lasix which led to resolution of her symptoms. • Pt saturation in 90's currently on 60L of O2   • PEG tube in place         Plan:  • Tracheostomy 9/17. Tracheal cuff in place  • Completed Decadron. • Atrovent/Xopenex  • Airway clearance protocol. IS.  • Continue 3% saline nebs     Large cell lymphoma (HCC)  Assessment & Plan  • Biopsy on 9/17: Large B-cell lymphoma, germinal center B-cell type, c-MYC and BCL-2 double expression. Ki-67 proliferation index is up to 90%; FISH & NGS pending. • Staging work-up: Currently stage II. • First inpatient chemotherapy 9/22/2023- with R-EPOCH.     Plan:  • Inpatient hematology oncology following. Recommendation appreciated. • Outpatient follow-up with hematology oncology. • Close TLS workup- uric acid, electrolytes  • Allopurinol as prophylaxis  • Neupogen  • PEG tube in place    Oropharyngeal mass  Assessment & Plan  • 9/14 Neck/ soft tissue CT: Large enhancing soft tissue mass identified within the left neck involving multiple spaces. This appears to be centered within the left oropharynx with extensions as described above into multiple spaces. This results in significant airway compromise.  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 Small level 3 and level 4 nodes are seen within the neck. • Tracheostomy by ENT, bx & addition testing performed  • Bx: Positive for malignancy, favor poorly differentiated carcinoma. Cannot entirely exclude a high grade large cell lymphoma. Portion of specimen submitted for flow cytometry. • H/o tobacco abuse, daily smoker. • Daughter was updated at bedside. • Preliminary biopsy: Large B-cell lymphoma, germinal center B-cell type, c-MYC and BCL-2 double expression. • First chemotherapy on 9/22.     Plan:  • ENT/ Med onc following, appreciated  • Follow final biopsy report  • Tracheal cuff in place, PEG tube in place    CKD (chronic kidney disease) stage 3, GFR 30-59 ml/min [facility])  Assessment & Plan  Lab Results   Component Value Date    EGFR 77 09/28/2023    EGFR 89 09/26/2023    EGFR 85 09/25/2023    CREATININE 0.78 09/28/2023    CREATININE 0.68 09/26/2023    CREATININE 0.72 09/25/2023   Stable at baseline,   Avoid nephrotoxic medications and fluctuations in blood pressure    Benign essential hypertension  Assessment & Plan  • Well controlled at home. • Home meds: Amlodipine 10 mg daily, Coreg 25 mg twice daily, lisinopril 10 mg daily. • Elevated BP may second to anxiety from chemotherapy/new diagnosis lymphoma, chronic pain.     Plan:  • Coreg 12.5  • Amlodipine 10 mg daily. • Lisinopril 40 mg daily. • PRN hydralazine if BP >160. • Consider increasing PO regimen given persistent hype    (HFpEF) heart failure with preserved ejection fraction [facility])  Assessment & Plan  Wt Readings from Last 3 Encounters:   09/28/23 74.9 kg (165 lb 2 oz)   09/07/23 74.6 kg (164 lb 6.4 oz)   08/30/23 73.3 kg (161 lb 9.6 oz)          • Volume status: Euvolemic. • POA physical (limited): JVD-, HJR-, B/L LE trace to 1+ edema. • Echo 8/28/23: LVEF 60%. D1DD. • CXR 9/22: Persistent pulmonary venous congestion with small effusions and bibasilar atelectasis.  IV lasix given with even I/O.  • Patient had an event of desaturation last night with her oxygen level falling in high 70s and low 80s, patient was assessed by the night team and was given Lasix which led to resolution of her symptoms        Plan:  • CMP, magnesium; Goal Mg > 2 and K > 4; Replete prn  • HOB > 30°, Daily standing weights, Measure I/O  • Will continue holding diuresis      Blister (nonthermal) of left forearm, sequela  Assessment & Plan  • 9/17/23: Blisters of LUE noted, no signs of infection  • Daily wound care, monitor signs of infection     Angioedema  Assessment & Plan  • POA with acute respiratory failure, SOB. • On physical in [place] ED: Swollen tongue, swelling soft and hard palate and almost the head of all phalanx is completely closed. Severe angioedema. • Decadron 10 mg, Benadryl 25 mg given. • Initially refused but eventually agreed with intubation. • Prior episode of angioedema in 2020 noted. Suspected coadministration of increase the dose of tramadol and lisinopril. • Per daughter, there is no recent change in medications except Trelegy inhaler, cefdinir. 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        Plan:  • Trach placed 9/17  • Tolerating trach, No issue  • PEG tube in place    Ambulatory dysfunction  Assessment & Plan  PT OT evaluation recommend postacute rehab  Fall precaution    Pain syndrome, chronic  Assessment & Plan  • Home regimen: Oxycodone 5 mg twice daily as needed. Tylenol 650 mg every 8 hours as needed. Gabapentin 600 mg 3 times daily. Medical marijuana. • Per daughter, patient was overusing Tylenol over-the-counter. • Pain mostly from lumbar radiculopathy. • Impaired ambulatory dysfunction second to pain.     Plan:  • Continue gabapentin 300 3 times daily, scheduled oxycodone 5 mg 3 times daily, as needed Tylenol 650 mg every 6 hours.   As needed fentanyl 50 mcg every 2 hours for severe pain    Hyperlipidemia  Assessment & Plan  Lab Results   Component Value Date     CHOLESTEROL 203 (H) 2023     CHOLESTEROL 177 2022     CHOLESTEROL 184 2020            Lab Results   Component Value Date     HDL 45 (L) 2023     HDL 37 (L) 2022     HDL 44 (L) 2020            Lab Results   Component Value Date     TRIG 166 (H) 2023     TRIG 160 (H) 2023     TRIG 108 2022            Lab Results   Component Value Date     NONHDLC 146 2018     NONHDLC 207 (H) 2013      Continue outpatient diet/ lifestyle modification                 VTE Pharmacologic Prophylaxis: VTE Score: 7 High Risk (Score >/= 5) - Pharmacological DVT Prophylaxis Ordered: enoxaparin (Lovenox). Sequential Compression Devices Ordered. Patient Centered Rounds: I performed bedside rounds with nursing staff today. Discussions with Specialists or Other Care Team Provider: Senior resident, attending and oncology    Education and Discussions with Family / Patient: Patient declined call to . Total Time Spent on Date of Encounter in care of patient: 25 mins. This time was spent on one or more of the following: performing physical exam; counseling and coordination of care; obtaining or reviewing history; documenting in the medical record; reviewing/ordering tests, medications or procedures; communicating with other healthcare professionals and discussing with patient's family/caregivers. Current Length of Stay: 15 day(s)  Current Patient Status: Inpatient   Certification Statement: The patient will continue to require additional inpatient hospital stay due to Management of hypoxia  Discharge Plan: Anticipate discharge in 48-72 hrs to group home. Code Status: Level 1 - Full Code    Subjective:   Pt is currently stable , no shortness of breath or labor breathing. Pt is saturating well in 90s. Pt is not able to verbalize, nods on commands.     Objective:     Vitals:   Temp (24hrs), Av.9 °F (37.2 °C), Min:97.4 °F (36.3 °C), Max:99.7 °F (37.6 °C)    Temp:  [97.4 °F (36.3 °C)-99.7 °F (37.6 °C)] 99.6 °F (37.6 °C)  HR:  [73-93] 83  Resp:  [0-37] 19  BP: (104-183)/() 104/59  SpO2:  [85 %-99 %] 85 %  Body mass index is 31.2 kg/m². Input and Output Summary (last 24 hours): Intake/Output Summary (Last 24 hours) at 9/28/2023 1505  Last data filed at 9/28/2023 1400  Gross per 24 hour   Intake 1045 ml   Output 100 ml   Net 945 ml       Physical Exam:   [unfilled] Physical Exam  Constitutional:       Appearance: She is ill-appearing. HENT:      Head: Normocephalic. Right Ear: External ear normal.      Left Ear: External ear normal.      Nose: Nose normal.      Mouth/Throat:      Mouth: Mucous membranes are moist.      Comments: Carcinoma on the left side   Eyes:      Extraocular Movements: Extraocular movements intact. Conjunctiva/sclera: Conjunctivae normal.      Pupils: Pupils are equal, round, and reactive to light. Neck:      Comments: Tracheal cuff in place    Cardiovascular:      Rate and Rhythm: Normal rate and regular rhythm. Pulses: Normal pulses. Heart sounds: Normal heart sounds. Pulmonary:      Effort: Pulmonary effort is normal.      Breath sounds: Rales present. Abdominal:      General: Bowel sounds are normal.      Palpations: Abdomen is soft. Comments: PEG Tube in place   Musculoskeletal:         General: Normal range of motion. Cervical back: Normal range of motion. Skin:     General: Skin is warm. Capillary Refill: Capillary refill takes less than 2 seconds. Neurological:      General: No focal deficit present. Mental Status: She is alert and oriented to person, place, and time.          Additional Data:     Labs:  Results from last 7 days   Lab Units 09/28/23  0604 09/25/23  0539 09/24/23  0500 09/23/23  0529 09/23/23  0529   WBC Thousand/uL 8.01   < > 13.95*  --  9.72   HEMOGLOBIN g/dL 11.4*   < > 11.0*  --  10.6*   HEMATOCRIT % 34.8   < > 34.2*  --  32.8*   PLATELETS Thousands/uL 158   < > 221  --  181   BANDS PCT %  --   --  2  --   --    NEUTROS PCT %  --   --   --   --  90*   LYMPHS PCT %  --   --   --   --  4*   LYMPHO PCT % 7*  --  4*   < >  --    MONOS PCT %  --   --   --   --  5   MONO PCT % 0*  --  0*   < >  --    EOS PCT % 0  --  0   < > 0    < > = values in this interval not displayed. Results from last 7 days   Lab Units 09/28/23  0604   SODIUM mmol/L 135   POTASSIUM mmol/L 3.2*   CHLORIDE mmol/L 96   CO2 mmol/L 33*   BUN mg/dL 21   CREATININE mg/dL 0.78   ANION GAP mmol/L 6   CALCIUM mg/dL 9.1   ALBUMIN g/dL 2.8*   TOTAL BILIRUBIN mg/dL 0.42   ALK PHOS U/L 40   ALT U/L 23   AST U/L 14   GLUCOSE RANDOM mg/dL 97     Results from last 7 days   Lab Units 09/27/23  0628   INR  0.96                   Lines/Drains:  Invasive Devices     Peripherally Inserted Central Catheter Line  Duration           PICC Line 49/23/25 Right Basilic 7 days          Peripheral Intravenous Line  Duration           Long-Dwell Peripheral IV (Midline) 49/18/29 Left Basilic 12 days          Drain  Duration           Gastrostomy/Enterostomy Percutaneous Endoscopic Gastrostomy (PEG) 16 Fr. LUQ 1 day          Airway  Duration           Surgical Airway Shiley Uncuffed 11 days                Central Line:  Goal for removal: N/A - Chronic PICC             Imaging: Personally reviewed the following imaging: chest xray and No pertinent imaging reviewed.     Recent Cultures (last 7 days):         Last 24 Hours Medication List:   Current Facility-Administered Medications   Medication Dose Route Frequency Provider Last Rate   • acetaminophen  650 mg Oral Q6H PRN Rickey Mosqueda MD     • acyclovir  400 mg Oral BID Rickey Mosqueda MD     • allopurinol  300 mg Oral Daily Trent Spencer MD     • alteplase  2 mg Intracatheter Q1MIN PRN Rickey Mosqueda MD     • amLODIPine  10 mg Oral Daily Trent Spencer MD     • busPIRone  30 mg Oral TID [facility] MD Tj     • carvedilol  12.5 mg Oral BID With Meals Deangelo Wilcox MD     • chlorhexidine  15 mL Mouth/Throat Q12H [facility] Trent Spencer MD     • enoxaparin  40 mg Subcutaneous Q24H [facility] Trent Spencer MD     • famotidine  20 mg Oral BID Patricia Odonnell MD     • Filgrastim-aafi  300 mcg Subcutaneous Daily [facility]     • fluconazole  400 mg Oral Daily Trent Spencer MD     • gabapentin  300 mg Oral TID Patricia Odonnell MD     • guaiFENesin  200 mg Oral Q6H Trent Spencer MD     • hydrALAZINE  10 mg Intravenous Q4H PRN Patricia Odonnell MD     • levalbuterol  1.25 mg Nebulization Q6H Trent Spencer MD      And   • ipratropium  0.5 mg Nebulization Q6H Trent Spencer MD     • levalbuterol  1.25 mg Nebulization Q8H PRN Patricia Odonnell MD     • levofloxacin  500 mg Oral Q24H [facility] Trent Spencer MD     • lidocaine 1% buffered   Infiltration PRN Maddison Christina MD     • lisinopril  40 mg Oral Daily Deangelo Wilcox MD     • nicotine  21 mg Transdermal Daily Trent Spencer MD     • nystatin  500,000 Units Swish & Swallow 4x Daily Trent Spencer MD     • ondansetron  4 mg Intravenous Q8H PRN Trent Spencer MD     • oxyCODONE  2.5 mg Oral Q4H PRN Patricia Odonnell MD      Or   • oxyCODONE  5 mg Oral Q4H PRN Patricia Odonnell MD     • oxyCODONE  5 mg Oral Q8H Trent Spencer MD     • polyethylene glycol  17 g Oral Daily PRN Patricia Odonnell MD     • senna  8.8 mg Per NG Tube HS Trent Spencer MD     • sertraline  50 mg Oral Daily Trent Spencer MD     • sodium chloride  4 mL Nebulization Q6H Trent Spencer MD     • sulfamethoxazole-trimethoprim  1 tablet Oral Once per day on Mon Wed Fri Patricia Odonnell MD Today, Patient Was Seen By: Neel Tyler MD    **Please Note: This note may have been constructed using a voice recognition system. **

## 2023-09-28 NOTE — ASSESSMENT & PLAN NOTE
• 9/17/23: Blisters of LUE noted, no signs of infection  • Daily wound care, monitor signs of infection

## 2023-09-28 NOTE — QUICK NOTE
I was notified overnight, that the patient was desaturating to high 70s to low 80s.   On 50 L of oxygen,  Stat chest x-ray was ordered-which did not show any significant change from the prior x-ray  She was seen at bedside by myself and Dr Ariel Booth  On examination bilateral basilar crackles noted  Her weight has gone up by 3 kg since admission  1 dose of IV Lasix was  administered due to suspicion of volume overload causing shortness of breath  Discussed the same with the critical care attending

## 2023-09-28 NOTE — WOUND OSTOMY CARE
Progress Note - Wound   Onesimo Krishnamurthy 69 y.o. female MRN: [id number]  Unit/Bed#: ICU 03 Encounter: [id number]        Assessment:   Patient seen today for wound care follow up assessment. Patient is incontinent of bowel and bladder. Patient is max assist with turning from side to side for assessment. Patient is independent with meals.      Assessment Findings:   Sacral/buttocks and B/L heels intact and blanching, preventative skin care orders placed.      1. Unstageable pressure injury under trach- area of full thickness skin loss from trach coupled with irritation from consistent exposure to mucous, wound tissue is 75 % yellow slough and 25% pink and periwound skin fragile, moderate amount of clear mucous noted at site. Patient appears to like to sit up causing the increase pressure from plate, recommending switch from gauze to optifoam so added off-loading. 2. Left arm wound- resolved    No induration, fluctuance, odor, warmth/temperature differences, redness, or purulence noted to the above noted wounds and skin areas assessed. New dressings applied per orders listed below. Patient tolerated well- no s/s of non-verbal pain or discomfort observed during the encounter. Bedside nurse aware of plan of care. See flow sheets for more detailed assessment findings. Wound care will continue to follow. Skin care plans:  1-Hydraguard to bilateral sacrum, buttock and heels BID and PRN  2-Elevate heels to offload pressure. 3-Ehob cushion in chair when out of bed. 4-Moisturize skin daily with skin nourishing cream.  5-Turn/reposition q2h or when medically stable for pressure re-distribution on skin. 6-Cleanse neck wound with normal saline, apply maxorb to wound, cover with split optifoam, change daily and PRN soilage/displacement.     Wound 09/17/23 Other (comment) Other (Comment) Arm Anterior;Left;Lower (Active)   Wound Image   09/28/23 0938   Wound Description Epithelialization 09/28/23 0938   Tracy-wound Assessment [place] 09/28/23 0938   Wound Length (cm) 0 cm 09/28/23 0938   Wound Width (cm) 0 cm 09/28/23 0938   Wound Depth (cm) 0 cm 09/28/23 0938   Wound Surface Area (cm^2) 0 cm^2 09/28/23 0938   Wound Volume (cm^3) 0 cm^3 09/28/23 0938   Calculated Wound Volume (cm^3) 0 cm^3 09/28/23 0938   Tunneling 0 cm 09/28/23 0938   Tunneling in depth located at 0 09/28/23 0938   Undermining 0 09/28/23 0938   Undermining is depth extending from 0 09/28/23 0938   Wound Site Closure MICA 09/28/23 0938   Drainage Amount None 09/28/23 0938   Drainage Description Clear 09/21/23 1600   Non-staged Wound Description Not applicable 27/00/61 9643   Treatments Cleansed 09/28/23 0938   Dressing Moisture barrier 09/28/23 0938   Wound packed? No 09/28/23 0938   Packing- # removed 0 09/28/23 0938   Packing- # inserted 0 09/28/23 0938   Dressing Changed New 09/28/23 0938   Patient Tolerance Tolerated well 09/28/23 0938   Dressing Status Clean;Dry; Intact 09/28/23 0938   Wound 09/21/23 Pressure Injury Throat Medial (Active)   Wound Image   09/28/23 0934   Wound Description Slough;Pink 09/28/23 0934   Tracy-wound Assessment Fragile 09/28/23 0934   Wound Length (cm) 1.2 cm 09/28/23 0934   Wound Width (cm) 2 cm 09/28/23 0934   Wound Depth (cm) 0.2 cm 09/28/23 0934   Wound Surface Area (cm^2) 2.4 cm^2 09/28/23 0934   Wound Volume (cm^3) 0.48 cm^3 09/28/23 0934   Calculated Wound Volume (cm^3) 0.48 cm^3 09/28/23 0934   Change in Wound Size % -140 09/28/23 0934   Tunneling 0 cm 09/28/23 0934   Tunneling in depth located at 0 09/28/23 0934   Undermining 0 09/28/23 0934   Undermining is depth extending from 0 09/28/23 0934   Wound Site Closure MICA 09/28/23 0934   Drainage Amount Moderate 09/28/23 0934   Drainage Description Serosanguineous 09/28/23 0934   Non-staged Wound Description Full thickness 09/28/23 0934   Treatments Cleansed 09/28/23 0934   Dressing Calcium Alginate; Foam 09/28/23 0934   Wound packed?  No 09/28/23 0934   Packing- # removed 0 09/28/23 0934 Packing- # inserted 0 09/28/23 1404   Dressing Changed New 09/28/23 0934   Patient Tolerance Tolerated well 09/28/23 0934   Dressing Status Clean;Dry; Intact 09/28/23 1350 13 Ca CORBIN BSN, RN, Reuben [facility] Kailyn

## 2023-09-28 NOTE — ASSESSMENT & PLAN NOTE
Lab Results   Component Value Date    EGFR 77 09/28/2023    EGFR 89 09/26/2023    EGFR 85 09/25/2023    CREATININE 0.78 09/28/2023    CREATININE 0.68 09/26/2023    CREATININE 0.72 09/25/2023   Stable at baseline,   Avoid nephrotoxic medications and fluctuations in blood pressure

## 2023-09-28 NOTE — SPEECH THERAPY NOTE
Speech Language/Pathology    Speech/Language Pathology Cancel Note    Patient Name: Jaron Vazquez  [id number]'X Date: 9/28/2023     Chart reviewed, pt s/p PEG placement yesterday. Attempted to see for po trials/dysphagia tx, however pt currently on high flow trach collar. Will hold today and follow up as able as appropriate.        Alisha Kuhn [facility] CCC-SLP  Speech Pathologist  Available via  tiger text

## 2023-09-28 NOTE — ASSESSMENT & PLAN NOTE
• Biopsy on 9/17: Large B-cell lymphoma, germinal center B-cell type, c-MYC and BCL-2 double expression. Ki-67 proliferation index is up to 90%; FISH & NGS pending. • Staging work-up: Currently stage II. • First inpatient chemotherapy 9/22/2023- with R-[facility]PO[facility].     Plan:  • Inpatient hematology oncology following. Recommendation appreciated. • Outpatient follow-up with hematology oncology.   • Close TLS workup- uric acid, electrolytes  • Allopurinol as prophylaxis  • Neupogen  • PEG tube in place

## 2023-09-28 NOTE — PROGRESS NOTES
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 CT AP with no lymphadenopathy; Brain MRI no lesion; stage II Large B Cell Lymphoma. She started her dose-adjusted systemic treatment, R-EPOCH, on 23 with the last dose on 23. She is scheduled to have Rituxan infusion on 23. Her CMP and uric acid have been unremarkable for tumor lysis. Overall, patient tolerated chemotherapy and immunotherapy very well; s/p rituxamab infusion on 23. She is also s/p IR gastrostomy tube placement on 23. Plan to start growth factor today, 23. On examination this morning, patient verbalized having a "rough night" and did not get enough sleep. However, she appeared well and expressed gratitude for the follow up. Denies pain, nausea, vomiting. Her peg tube is clean, dry and intact. Discussed that we will be starting her on a growth factor s/p chemotherapy. Assessment and Plan  1. 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  Left Jugular Lymphadenopathy     Started systemic treatment: EPOCH-R Cycle 1 day 1-3: Etoposide, Doxorubicin, Vincristine , Cyclophosphamide , prednisone + Rituximab (23-23)    CMP  Uric acid 2.5 () < 2.2 () < 2.4 < 2.7 < 2.8   WNL  Potassium 3.2 < 3.7 <3.4 < 3.6 < 3.4   WNL  Calcium 10.5 < 8.38 < 8.8   Total Bili 0.42 < 0.38 <0.30   WNL  Phosphorus 3.4 < 2.2 < 2.1 < 2.5  WNL  Creatinine 0.78 < 0.68   WNL  AST/ALT   <14   WNL    CBC  Hemoglobin 11.4 <11.7 () < 11.2 < 10.6 < 10.9  WBC 8.01 < 8.67 ()11.23 < 13.95 < 9.72  Platelets 377 < 793 () < 204 < 221 < 181    Imagin23 CTA Chest Rt middle lobe consolidation (PNA), slight regression of previous Notified of results. hilar and mediastinal LNs (had recent PNA in August 2023)      9/14/23 CT Soft Tissue Neck w contrast: soft tissue mass ~ 6.1 x 4.7 x 5.2 cm appears to be centered within Lt oropharynx involving multiple spaces and extends into the nasopharynx. .. multiple small jugular chain nodes on the left.      9/17/23 nasopharynx mass biopsy initial results positive for malignancy favor poorly differentiated carcinoma. Cannot exclude lymphoma. Flow cytometry pending.      PLAN:  1) S/p Day 3/3 for Cycle 1 EPOCH (etoposidevincristine, cyclophosphamide, doxorubicin) on 9/25/23, and rituxamab on 9/27/23. Will commence with growth factor today. Neupogen ordered at 5 mg/kg (375 mg); will watch CBC daily. 2) Continue to monitor for tumor lysis syndrome. CMP unremarkable    3) Continue to monitor renal and hepatic function (etoposide, cyclophosphamide). 4) Prophylaxis medications in place    5) Outpatient follow up plan: Dr. Benny Villarreal 10/03/23     Subjective:    Review of Systems   Constitutional: Positive for fatigue. Negative for chills, diaphoresis and fever. HENT: Negative for ear pain and sore throat. Neck mass    Eyes: Negative for pain and visual disturbance. Respiratory: Negative for cough, chest tightness, shortness of breath and wheezing. Cardiovascular: Negative for chest pain and palpitations. Gastrointestinal: Negative for abdominal pain, blood in stool, diarrhea, nausea and vomiting. Genitourinary: Negative for difficulty urinating, dysuria and hematuria. Musculoskeletal: Negative for arthralgias and back pain. Skin: Negative for color change and rash. Neurological: Positive for weakness. Negative for dizziness, seizures, syncope and headaches. Psychiatric/Behavioral: Negative for agitation. The patient is nervous/anxious. All other systems reviewed and are negative.     Objective:     Medication Administration - last 24 hours from 09/27/2023 0923 to 09/28/2023 0073       Date/Time Order Dose Route Action Action by     09/28/2023 0801 EDT chlorhexidine (PERIDEX) 0.12 % oral rinse 15 mL 15 mL Mouth/Throat Given Fernando Lockett RN     09/27/2023 2032 EDT chlorhexidine (PERIDEX) 0.12 % oral rinse 15 mL 15 mL Mouth/Throat Given Lina aKhn RN     09/28/2023 9540 EDT busPIRone (BUSPAR) tablet 30 mg 30 mg Oral Given Fernando Lockett RN     09/27/2023 2027 EDT busPIRone (BUSPAR) tablet 30 mg 30 mg Oral Given Lina Kahn, TINO     09/27/2023 1617 EDT busPIRone (BUSPAR) tablet 30 mg 30 mg Oral Given Ilir Spence RN     09/28/2023 0804 EDT sertraline (ZOLOFT) tablet 50 mg 50 mg Oral Given Fernando Lockett RN     09/28/2023 0801 EDT nystatin (MYCOSTATIN) oral suspension 500,000 Units 500,000 Units Swish & Swallow Given Fernando Lockett RN     09/27/2023 2132 EDT nystatin (MYCOSTATIN) oral suspension 500,000 Units 500,000 Units Swish & Swallow Given Lina Kahn RN     09/27/2023 1726 EDT nystatin (MYCOSTATIN) oral suspension 500,000 Units 500,000 Units Swish & Swallow Given Ilir Spence, RN     09/27/2023 1152 EDT nystatin (MYCOSTATIN) oral suspension 500,000 Units 500,000 Units Swish & Swallow Not Given Ilir Spence, RN     09/28/2023 0715 EDT levalbuterol Trevon Pilling) inhalation solution 1.25 mg 1.25 mg Nebulization Given Everlena House, RT     09/28/2023 0106 EDT levalbuterol (Trevon Pilling) inhalation solution 1.25 mg 1.25 mg Nebulization Given Debria Peel, RT     09/27/2023 1948 EDT levalbuterol (Trevon Pilling) inhalation solution 1.25 mg 1.25 mg Nebulization Given Debria Peel, RT     09/27/2023 1400 EDT levalbuterol (XOPENEX) inhalation solution 1.25 mg 1.25 mg Nebulization Not Given Sruthia Andrew, RT     09/28/2023 0715 EDT ipratropium (ATROVENT) 0.02 % inhalation solution 0.5 mg 0.5 mg Nebulization Given Antonia Morales, RT     09/28/2023 0106 EDT ipratropium (ATROVENT) 0.02 % inhalation solution 0.5 mg 0.5 mg Nebulization Given Ramya Gamboa, RT     09/27/2023 1948 EDT ipratropium (ATROVENT) 0.02 % inhalation solution 0.5 mg 0.5 mg Nebulization Given Gerhardt Moores, RT     09/27/2023 1400 EDT ipratropium (ATROVENT) 0.02 % inhalation solution 0.5 mg 0.5 mg Nebulization Not Given Mariel Vickers, RT     09/28/2023 0804 EDT gabapentin (NEURONTIN) capsule 300 mg 300 mg Oral Given Cande Molina, RN     09/27/2023 2027 EDT gabapentin (NEURONTIN) capsule 300 mg 300 mg Oral Given Ayanna Baker, RN     09/27/2023 1617 EDT gabapentin (NEURONTIN) capsule 300 mg 300 mg Oral Given Bud Lim, RN     09/28/2023 0801 EDT nicotine (NICODERM CQ) 21 mg/24 hr TD 24 hr patch 21 mg 21 mg Transdermal Medication Applied Cande Molina, RN     09/28/2023 0263 EDT nicotine (NICODERM CQ) 21 mg/24 hr TD 24 hr patch 21 mg 21 mg Transdermal Patch Removed Cande Molina, RN     09/28/2023 0801 EDT enoxaparin (LOVENOX) subcutaneous injection 40 mg 40 mg Subcutaneous Given Cande Molina, RN     09/28/2023 0802 EDT amLODIPine (NORVASC) tablet 10 mg 10 mg Oral Given Cande Molina, RN     09/27/2023 2118 EDT senna oral syrup 8.8 mg 8.8 mg Per NG Tube Given Ayanna Baker, RN     09/28/2023 0801 EDT guaiFENesin (ROBITUSSIN) oral liquid 200 mg 200 mg Oral Given Cande Molina, RN     09/28/2023 0134 EDT guaiFENesin (ROBITUSSIN) oral liquid 200 mg 200 mg Oral Given Herberth Hermosillo, RN     09/27/2023 2026 EDT guaiFENesin (ROBITUSSIN) oral liquid 200 mg 200 mg Oral Given Ayanna Baker, RN     09/27/2023 1617 EDT guaiFENesin (ROBITUSSIN) oral liquid 200 mg 200 mg Oral Given Bud Lim, RN     09/28/2023 0407 EDT oxyCODONE (ROXICODONE) oral solution 2.5 mg -- Oral See Alternative Herberth Hermosillo, RN     09/28/2023 0407 EDT oxyCODONE (ROXICODONE) oral solution 5 mg 5 mg Oral Given Herberth Hermosillo, TINO     09/28/2023 0804 EDT famotidine (PEPCID) tablet 20 mg 20 mg Oral Given Cande Molina, TINO     09/27/2023 1726 EDT famotidine (PEPCID) tablet 20 mg 20 mg Oral Given Bud Lim RN     09/28/2023 0802 EDT allopurinol (ZYLOPRIM) tablet 300 mg 300 mg Oral Given Wilielen Hellerkesha, RN     09/28/2023 5046 EDT levofloxacin (LEVAQUIN) tablet 500 mg 500 [facility]     09/28/2023 5663 EDT fluconazole (DIFLUCAN) tablet 400 mg 400 mg [facility]     09/28/2023 3933 EDT acyclovir (ZOVIRAX) capsule 400 mg 400 mg [facility]     09/27/2023 1726 EDT acyclovir (ZOVIRAX) capsule 400 mg 400 mg Oral Given [facility], RN     09/28/2023 2725 EDT oxyCODONE (ROXICODONE) oral solution 5 mg 5 mg Oral Given Leny Rodriguez, RN     09/27/2023 2118 EDT oxyCODONE (ROXICODONE) oral solution 5 mg 5 mg Oral Given Kristian Jamison, RN     09/27/2023 1617 EDT oxyCODONE (ROXICODONE) oral solution 5 mg 5 mg Oral Given [facility], RN     09/28/2023 0715 EDT sodium chloride 3 % inhalation solution 4 mL 4 mL Nebulization Given Aleksn Else, RT     09/28/2023 0106 EDT sodium chloride 3 % inhalation solution 4 mL 4 mL Nebulization Given Yvonne Telly, RT     09/27/2023 1948 EDT sodium chloride 3 % inhalation solution 4 mL 4 mL Nebulization Given Yvonne Telly, RT     09/27/2023 1400 EDT sodium chloride 3 % inhalation solution 4 mL 4 mL Nebulization Not Given Aleksn Else, RT     09/27/2023 1312 EDT riTUXimab (RITUXAN) 675 mg in sodium chloride 0.9 % 270 mL first titrated chemo infusion 0 mg Intravenous Stopped Chalino Shelton, RN     09/27/2023 1237 EDT riTUXimab (RITUXAN) 675 mg in sodium chloride 0.9 % 270 mL first titrated chemo infusion -- Intravenous Rate/Dose Change Chalino Shelton, RN     09/27/2023 1207 EDT riTUXimab (RITUXAN) 675 mg in sodium chloride 0.9 % 270 mL first titrated chemo infusion -- Intravenous Rate/Dose Change Chalino Shelton, RN     09/27/2023 1136 EDT riTUXimab (RITUXAN) 675 mg in sodium chloride 0.9 % 270 mL first titrated chemo infusion -- Intravenous Rate/Dose Change Chalino Shelton RN     09/27/2023 1104 EDT riTUXimab (RITUXAN) 675 mg in sodium chloride 0.9 % 270 mL first titrated chemo infusion -- Intravenous Rate/Dose Change Quang Valles RN     09/27/2023 1029 EDT riTUXimab (RITUXAN) 675 mg in sodium chloride 0.9 % 270 mL first titrated chemo infusion -- Intravenous Rate/Dose Change Quang Valles RN     09/27/2023 0954 EDT riTUXimab (RITUXAN) 675 mg in sodium chloride 0.9 % 270 mL first titrated chemo infusion -- Intravenous Rate/Dose Change Quang Valles RN     09/28/2023 0804 EDT lisinopril (ZESTRIL) tablet 40 mg 40 mg Oral Given Mindy Davenport RN     09/28/2023 8967 EDT carvedilol (COREG) tablet 12.5 mg 12.5 mg Oral Given Mindy Davenport RN     09/27/2023 1617 EDT carvedilol (COREG) tablet 12.5 mg 12.5 mg Oral Given Sanchez Lopez RN     09/27/2023 1426 EDT lidocaine 1% buffered 10 mL Infiltration Given Esdras Pérez MD     09/27/2023 1445 EDT iohexol (OMNIPAQUE) 350 MG/ML injection (SINGLE-DOSE) 25 mL 25 mL Other Given Francisco Javier Pacheco     09/28/2023 0134 EDT furosemide (LASIX) injection 40 mg 40 mg Intravenous Given Abdirashid Ross RN     09/28/2023 4352 EDT potassium chloride 40 mEq IVPB (premix) 40 mEq Intravenous New Bag Mindy Davenport RN          /71   Pulse 88   Temp 99.7 °F (37.6 °C) (Oral)   Resp 16   Ht 5' 1" (1.549 m)   Wt 74.9 kg (165 lb 2 oz)   SpO2 94%   BMI 31.20 kg/m²       Physical Exam  Constitutional:       Appearance: She is not ill-appearing. HENT:      Head: Normocephalic and atraumatic. Comments: Trach cuff in place     Right Ear: External ear normal.      Left Ear: External ear normal.      Nose: No congestion or rhinorrhea. Mouth/Throat:      Mouth: Mucous membranes are moist.   Eyes:      Extraocular Movements: Extraocular movements intact. Conjunctiva/sclera: Conjunctivae normal.      Pupils: Pupils are equal, round, and reactive to light. Neck:      Comments: L neck mass  Cardiovascular:      Rate and Rhythm: Normal rate and regular rhythm. Pulses: Normal pulses. Heart sounds: Normal heart sounds. No murmur heard. No gallop. Pulmonary:      Effort: Pulmonary effort is normal. No respiratory distress. Breath sounds: No wheezing, rhonchi or rales. Abdominal:      General: Bowel sounds are normal. There is no distension. Palpations: There is no mass. Tenderness: There is no abdominal tenderness. There is no guarding. Comments: Peg tube in place   Musculoskeletal:      Right lower leg: No edema. Left lower leg: No edema. Skin:     General: Skin is warm. Capillary Refill: Capillary refill takes less than 2 seconds. Coloration: Skin is not jaundiced or pale. Findings: Rash present. Neurological:      General: No focal deficit present. Mental Status: She is alert and oriented to person, place, and time. Psychiatric:         Mood and Affect: Mood normal.         Behavior: Behavior normal.         Thought Content:  Thought content normal.         Judgment: Judgment normal.         Recent Results (from the past 48 hour(s))   Protime-INR    Collection Time: 09/27/23  6:28 AM   Result Value Ref Range    Protime 13.4 11.6 - 14.5 seconds    INR 0.96 0.84 - 1.19   Blood gas, arterial    Collection Time: 09/28/23 12:56 AM   Result Value Ref Range    pH, Arterial 7.459 (H) 7.350 - 7.450    pCO2, Arterial 46.9 (H) 36.0 - 44.0 mm Hg    pO2, Arterial 75.0 75.0 - 129.0 mm Hg    HCO3, Arterial 32.5 (H) 22.0 - 28.0 mmol/L    Base Excess, Arterial 7.6 mmol/L    O2 Content, Arterial 16.6 16.0 - 23.0 mL/dL    O2 HGB,Arterial  94.2 94.0 - 97.0 %    SOURCE Radial, Left     GENO TEST Yes     ROOM AIR FIO2 100 %    Non Vent Type- HFNC     CBC and differential    Collection Time: 09/28/23  6:04 AM   Result Value Ref Range    WBC 8.01 4.31 - 10.16 Thousand/uL    RBC 3.67 (L) 3.81 - 5.12 Million/uL    Hemoglobin 11.4 (L) 11.5 - 15.4 g/dL    Hematocrit 34.8 34.8 - 46.1 %    MCV 95 82 - 98 fL    MCH 31.1 26.8 - 34.3 pg    MCHC 32.8 31.4 - 37.4 g/dL    RDW 14.6 11.6 - 15.1 %    MPV 9.7 8.9 - 12.7 fL    Platelets 431 149 - 390 Thousands/uL   Comprehensive metabolic panel    Collection Time: 09/28/23  6:04 AM   Result Value Ref Range    Sodium 135 135 - 147 mmol/L    Potassium 3.2 (L) 3.5 - 5.3 mmol/L    Chloride 96 96 - 108 mmol/L    CO2 33 (H) 21 - 32 mmol/L    ANION GAP 6 mmol/L    BUN 21 5 - 25 mg/dL    Creatinine 0.78 0.60 - 1.30 mg/dL    Glucose 97 65 - 140 mg/dL    Calcium 9.1 8.4 - 10.2 mg/dL    Corrected Calcium 10.1 8.3 - 10.1 mg/dL    AST 14 13 - 39 U/L    ALT 23 7 - 52 U/L    Alkaline Phosphatase 40 34 - 104 U/L    Total Protein 5.4 (L) 6.4 - 8.4 g/dL    Albumin 2.8 (L) 3.5 - 5.0 g/dL    Total Bilirubin 0.42 0.20 - 1.00 mg/dL    eGFR 77 ml/min/1.73sq m   Uric acid    Collection Time: 09/28/23  6:04 AM   Result Value Ref Range    Uric Acid 2.5 2.0 - 7.5 mg/dL   Phosphorus    Collection Time: 09/28/23  6:04 AM   Result Value Ref Range    Phosphorus 3.4 2.3 - 4.1 mg/dL   Magnesium    Collection Time: 09/28/23  6:04 AM   Result Value Ref Range    Magnesium 1.9 1.9 - 2.7 mg/dL   Manual Differential(PHLEBS Do Not Order)    Collection Time: 09/28/23  6:04 AM   Result Value Ref Range    Segmented % 93 (H) 43 - 75 %    Lymphocytes % 7 (L) 14 - 44 %    Monocytes % 0 (L) 4 - 12 %    Eosinophils, % 0 0 - 6 %    Basophils % 0 0 - 1 %    Absolute Neutrophils 7.45 1.85 - 7.62 Thousand/uL    Lymphocytes Absolute 0.56 (L) 0.60 - 4.47 Thousand/uL    Monocytes Absolute 0.00 0.00 - 1.22 Thousand/uL    Eosinophils Absolute 0.00 0.00 - 0.40 Thousand/uL    Basophils Absolute 0.00 0.00 - 0.10 Thousand/uL    Total Counted      RBC Morphology Normal     Platelet Estimate Adequate Adequate       US right upper quadrant    Result Date: 9/22/2023  Narrative: RIGHT UPPER QUADRANT ULTRASOUND INDICATION:     CT finding N/V +Barak. COMPARISON: CT abdomen/pelvis 9/21/2023 TECHNIQUE:   Real-time ultrasound of the right upper quadrant was performed with a curvilinear transducer with both volumetric sweeps and still imaging techniques.  FINDINGS: Evaluation limited as per sonographer's note in PACS. PANCREAS:  Visualized portions of the pancreas are within normal limits. AORTA AND IVC:  Visualized portions are normal for patient age. LIVER: Size:  Within normal range. The liver measures 15.3 cm in the midclavicular line. Contour:  Surface contour is smooth. Parenchyma:  Echogenicity and echotexture are within normal limits. No liver mass identified. Limited imaging of the main portal vein shows it to be patent and hepatopetal. BILIARY: The gallbladder is normal in caliber. Gallbladder wall thickness upper limits of normal. No pericholecystic fluid. There is gallbladder sludge without evidence for shadowing calculi. No sonographic Cancino sign. No intrahepatic biliary dilatation. CBD measures 4.0 mm. No choledocholithiasis. KIDNEY: Right kidney measures 11.9 x 5.2 x 6.4 cm. Volume 207.6 mL Several simple cysts, the largest of which measures 4.1 cm. 2.8 cm septated cyst in the upper pole. No hydronephrosis or perinephric collection. ASCITES:   None. Impression: No cholelithiasis. Gallbladder sludge is present with wall thickness upper limits of normal. Negative sonographic Cancino sign. Findings may be sequela of chronic gallbladder dysfunction. Consider follow-up with a HIDA scan if it would alter patient management. Workstation performed: [id number]     CT abdomen pelvis w contrast    Result Date: 9/21/2023  Narrative: CT ABDOMEN AND PELVIS WITH IV CONTRAST INDICATION:   Head/neck cancer, staging lymohoma staging prior to treatment with chemo. COMPARISON: 9/13/2023. TECHNIQUE:  CT examination of the abdomen and pelvis was performed. Multiplanar 2D reformatted images were created from the source data. This examination, like all CT scans performed in the [facility], was performed utilizing techniques to minimize radiation dose exposure, including the use of iterative reconstruction and automated exposure control.  Radiation dose length product (DLP) for this visit:  813 mGy-cm IV Contrast:  100 mL of iohexol (OMNIPAQUE) Enteric Contrast:  Enteric contrast was not administered. FINDINGS: Mildly degraded by respiratory motion. ABDOMEN LOWER CHEST: There is bibasilar atelectasis. LIVER/BILIARY TREE:  Unremarkable. GALLBLADDER:  No calcified gallstones. There may be mild gallbladder wall thickening though evaluation is degraded by respiratory motion. No surrounding inflammatory changes. SPLEEN:  Unremarkable. PANCREAS:  Unremarkable. ADRENAL GLANDS:  Unremarkable. KIDNEYS/URETERS: Multiple bilateral renal cysts. No hydronephrosis. STOMACH AND BOWEL: There is colonic diverticulosis without evidence of acute diverticulitis. APPENDIX:  No findings to suggest appendicitis. ABDOMINOPELVIC CAVITY:  No ascites. No pneumoperitoneum. No lymphadenopathy. VESSELS:  Unremarkable for patient's age. PELVIS REPRODUCTIVE ORGANS:  Unremarkable for patient's age. URINARY BLADDER:  Unremarkable. ABDOMINAL WALL/INGUINAL REGIONS:  Fat containing right inguinal hernia noted. Fat-containing umbilical hernia. OSSEOUS STRUCTURES: Again seen is a destructive lytic lesion in the T12 vertebral body with sclerotic borders, progressed from 2013 and with chronic appearing pathologic fracture. Impression: 1. No abdominal lymphadenopathy. 2.  Question gallbladder wall thickening versus motion artifact. If there is clinical concern for gallbladder pathology, ultrasound is recommended. 3.  Chronic T12 lytic lesion with pathologic fracture. Workstation performed: [id number]     MRI soft tissue neck wo and w contrast    Result Date: 9/21/2023  Narrative: MRI OF THE SOFT TISSUES OF THE NECK - WITH AND WITHOUT CONTRAST INDICATION: Oropharyngeal mass s/p biopsy (+) for carcinoma COMPARISON: Neck CT from 9/14/2023 TECHNIQUE:  Multiplanar, multisequence imaging of the soft tissues of the neck was performed before and after gadolinium administration.  . IV Contrast:  7.5 mL of Gadobutrol injection (SINGLE-DOSE) IMAGE QUALITY: Motion degrades images. FINDINGS: VISUALIZED BRAIN PARENCHYMA: No acute or suspicious findings. Please see today's brain MR report. VISUALIZED ORBITS AND PARANASAL SINUSES: Globes and orbits are within normal limits. Pan paranasal sinus mucosal thickening, greatest involving ethmoids and sphenoids. NASAL CAVITY, NASOPHARYNX, and OROHYPOPHARYNX and LARYNX: Approximately 7.5 x 4.0 x 7.8 cm (length X depth X width) soft tissue mass, epicenter likely the left lateral pharyngeal recess. Enhances and restricts diffusion. Central, irregular fluid signal intensity area without enhancement appears contiguous with fluid around an endotracheal tube, likely trapped secretions and circumferential mass. Mass extends from the left greater than right nasopharynx superiorly to the cricoid cartilage inferiorly. Extends to the posterior nasal cavity. Displaces the soft palate, uvula and tongue base anteriorly. Oral cavity is otherwise within normal limits. Displaces the left pterygoid muscles anterolaterally. Extends posterior to the angle of the mandible approximately 1.4 cm, abutting and mildly laterally displacing the deep parotid, inseparable from the gland. Effaces the left parapharyngeal fat. Discrete epiglottis not seen, grossly anteriorly displaced. Extends along the left aryepiglottic fold and posterior pharyngeal wall to the to the inferior supraglottic airway, grossly terminating just above or at the left false cord. Extends to the left carotid space, encircles the carotid with severe narrowing and posterior-lateral displacement of the distal cervical and most proximal petrous vessel. Otherwise preserved left internal carotid flow-void. Posterior-lateral displacement  and occlusion of the internal jugular vein at the level of the angle of the mandible in the distal neck. Mass extends to the proximal jugular foramen. Laryngeal ventricles and true cords appear preserved.  Normal fat signal  preserved in the cricoid and thyroid cartilages. Enteric and endotracheal tubes are displaced rightward. Trace retropharyngeal effusion. THYROID GLAND:   Within normal limits. PAROTID AND SUBMANDIBULAR GLANDS: Both submandibular and the right parotid glands are within normal limits. Deep left parotid involvement mentioned above. LYMPH NODES: Similar small jugular chain nodes more numerous on the left than the right, but none considered pathologically enlarged. 0.7 x 0.5 cm suspicious right retropharyngeal node (4/27). Left retropharyngeal space is obliterated by mass, no separate adenopathy. VASCULAR STRUCTURES: Left carotid a jugular involvement described above. Right carotid artery and jugular veins are within normal limits. THORACIC INLET: Similar to CT. Dependent lung opacities. CERVICAL SPINE: Normal appearance. OTHER: Left greater than right mastoid effusions with patchy left enhancement and restricted diffusion were described in today's brain MR report. Asymmetric opacity involves the left petrous apex. Normal appearance of the IACs and inner ear structures. No cerebellopontine angle mass. Impression: Known, large left neck mass described in detail above. Workstation performed: [id number]     MRI brain w wo contrast    Result Date: 9/21/2023  Narrative: MRI BRAIN WITHOUT AND WITH CONTRAST INDICATION:  Oropharyngeal mass s/p biopsy (+) for carcinoma . COMPARISON: 6/17/2017 CT of the brain TECHNIQUE:  Multiplanar/multisequence MRI of the brain was performed administration of contrast. IV Contrast:  7.5 mL of Gadobutrol injection (SINGLE-DOSE) IMAGE QUALITY:  Diagnostic. FINDINGS: BRAIN PARENCHYMA: No  hemorrhage, extra-axial fluid collection, acute infarct, mass effect or midline shift. Mild, focal patchy periventricular and subcortical white matter foci of abnormal T2 and FLAIR hyperintensity are nonspecific, but usually secondary to changes of microangiopathy.  Small developmental venous angioma in the left posterior frontal lobe, typically clinically insignificant. No suspicious enhancement or masses. VENTRICLES:  Within normal limits for age. SELLA AND PITUITARY GLAND:  Within normal limits. ORBITS:  Within normal limits. PARANASAL SINUSES: Mucosal thickening. VASCULATURE: Preserved skull base flow voids. Normal enhancement. CALVARIUM AND SKULL BASE: Bilateral mastoid effusions are likely secondary mass, related to eustachian tube obstruction from nasopharyngeal mass. Patient is also intubated. Small mucosal retention cyst at the right fossa of Rosenmuller. Small ill-defined foci of enhancement in the superior mastoid and asymmetric left mastoid restricted diffusion. No coalescence or discrete mass. Skull and visualized cervical spine are within normal limits. EXTRACRANIAL SOFT TISSUES:  A nasopharyngeal mass will be described in further detail on today's neck MR report. Impression: No evidence of brain metastases. 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 Asymmetric patchy enhancement and restricted diffusion in the left mastoid. The differential includes neoplastic involvement and infection. Workstation performed: [id number]     XR chest portable    Result Date: 9/20/2023  Narrative: CHEST INDICATION:   NG tube placement. COMPARISON: 9/17/2023 EXAM PERFORMED/VIEWS:  XR CHEST PORTABLE FINDINGS: Tracheostomy tube is in stable position. Distal portions of nasogastric tube are seen below the hemidiaphragm within the left upper abdomen. The tip of the tube extends beyond the inferior margin of this study. Heart shadow is enlarged but unchanged from prior exam. There is mild pulmonary vascular congestion. The left costophrenic angle is obscured. Hazy opacities are additionally noted within the right costophrenic angle. No evidence of acute osseous abnormality. Impression: 1.  Nasogastric tube is seen with its distal portions within the stomach. The tip of the tube courses beyond the inferior margin of the study. 2. Pulmonary vascular congestion with obscuration of the left hemidiaphragm. This is stable from prior radiograph and may represent small left effusion versus consolidation. 3. Right basilar atelectasis versus trace right effusion. Workstation performed: [id number]     XR chest portable    Result Date: 9/18/2023  Narrative: CHEST INDICATION:   Assess. COMPARISON:  None EXAM PERFORMED/VIEWS:  XR CHEST PORTABLE Images: 2 FINDINGS: Tracheostomy tube is seen in appropriate position. A feeding tube is seen extending down below the dome of the diaphragm Cardiomegaly seen Increased lung markings seen suggest congestion left base is obscured Osseous structures appear within normal limits for patient age. Impression: Increased lung markings seen suggest congestion. The left base is obscured No worsening Workstation performed: [id number]     CT soft tissue neck w contrast    Result Date: 9/14/2023  Narrative: CT NECK WITH CONTRAST INDICATION:   Laryngeal edema COMPARISON:  None. TECHNIQUE:  Axial, sagittal, and coronal 2D reformatted images were created from the axial source data and submitted for interpretation. Radiation dose length product (DLP) for this visit:  769 mGy-cm . This examination, like all CT scans performed in the [facility], was performed utilizing techniques to minimize radiation dose exposure, including the use of iterative reconstruction and automated exposure control. IV Contrast:  85 mL of iohexol (OMNIPAQUE) IMAGE QUALITY:  Diagnostic. FINDINGS: VISUALIZED BRAIN PARENCHYMA:  No acute intracranial pathology of the visualized brain parenchyma. VISUALIZED ORBITS: Normal visualized orbits. PARANASAL SINUSES: Normal visualized paranasal sinuses. Nasopharynx, oropharynx AND HYPOPHARYNX: There is a large heterogeneously enhancing mass identified within the neck involving multiple spaces.  The origin is difficult to ascertain given the large size. This mass measures approximately 6.1 x 4.7 x 5.2 cm and appears to be centered within the left oropharynx. The mass extends superiorly into the nasopharynx left more so than right and into the posterior aspect of the nasal cavity. The mass also extends across the midline within the oropharynx and inferiorly into the left lateral oropharynx but not below the laryngeal ventricle. The mass extends laterally into the infratemporal fossa and parapharyngeal fat and is inseparable from infratemporal musculature and deep lobe of the parotid gland. There is extension into the prevertebral space where the mass is inseparable from the anterior paraspinal muscle on the left and posteriorly and laterally into the carotid space where the mass is displacing and inseparable from jugular and carotid. The mass encases the cervical internal carotid artery just below the level of the skull base resulting in narrowing of the vessel and the mass compresses and occludes the jugular vein below the skull base. The vessel does reconstitute via collateral vessels as it  extends inferiorly within the neck. The mass extends superiorly into the skull base inseparable from the carotid canal and jugular foramen. There is severe airway compromise within the posterior oral cavity and superior oropharynx. ET tube and OG tube are present. THYROID GLAND:  Unremarkable. PAROTID AND SUBMANDIBULAR GLANDS: Normal parotid and submandibular glands other than the mass abutting the deep lobe of the left parotid gland. LYMPH NODES: Multiple small jugular chain nodes are seen on the left. VASCULAR STRUCTURES: As described above the mass partially encases the cervical internal carotid artery, narrowing the vessel and occludes the jugular vein from the skull base to the mid neck. Below this the vessel is unremarkable due to collateral reconstitution.  THORACIC INLET: Posterior left upper lobe consolidation may represent atelectasis or pneumonia. Mild patchy groundglass opacity within the upper lung fields. BONY STRUCTURES: At T1-2 there is a left paramedian bridging osteophyte resulting in mild to moderate left anterior lateral canal stenosis. Impression: Large enhancing soft tissue mass identified within the left neck involving multiple spaces. This appears to be centered within the left oropharynx with extensions as described above into multiple spaces. This results in significant airway compromise. 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 Small level 3 and level 4 nodes are seen within the neck. I personally discussed this study with ICU resident on 9/14/2023 4:44 PM. Workstation performed: [id number]     Bronchoscopy    Result Date: 9/14/2023  Narrative: Table formatting from the original result was not included. [address] [place] [address] [phone] DATE OF SERVICE: 9/14/23 PHYSICIAN(S): Attending: No Staff Documented Fellow: No Staff Documented INDICATION: Acute respiratory failure with hypoxia ([address]) POST-OP DIAGNOSIS: See the impression below. PREPROCEDURE: Standard airway preparation completed per respiratory therapy protocol. Informed consent was obtained. Images reviewed prior to the procedure. A Time Out was performed. No suspicion or identified risk for TB or other airborne infectious disease; bronchoscopy procedure being performed for diagnostic purposes. PROCEDURE: Bronchoscopy DETAILS OF PROCEDURE: Patient was taken to the procedure room where a time out was performed to confirm correct patient and correct procedure. The patient was already under sedation prior to the procedure. The patient's blood pressure, ECG, heart rate, level of consciousness, respirations and oxygen were monitored throughout the procedure. The patient experienced no blood loss. The scope was introduced through the endotracheal tube.  The procedure was moderately difficult due to patient intolerance and persistent coughing. In response to procedure difficulty, deeper sedation was employed. The patient tolerated the procedure well. There were no apparent adverse events. ANESTHESIA INFORMATION: ASA: ASA status not filed in the log. Anesthesia Type: Anesthesia type not filed in the log. FINDINGS: The lower trachea, main sujata, left main stem, KARTHIK, lingula, LLL, right main stem, RUL, bronchus intermedius, RML and RLL appeared normal. Left lower lung zone with no endobronchial lesions, left upper and lingula both appear normal Right upper, right middle and right lower lobes all appeared normal with no endobronchial lesions Endotracheal tube was retracted 3 cm under direct visualization with the bronchoscope Bronchoalveolar lavage was performed x1 in the right lateral segment (RB4) with 60 mL of saline instilled and a total return of 40 mL; the fluid appeared cloudy SPECIMENS: ID Type Source Tests Collected by Time Destination 1 :  Lavage Lung, Right Middle Lobe Bronchoalveolar Lavage PULMONARY CYTOLOGY Dayana Sheriff MD 9/14/2023 12:55 PM  A :  Bronchial Lung, Right Middle Lobe Bronchoalveolar Lavage FUNGAL CULTURE, VIRUS CULTURE, BRONCHIAL CULTURE AND GRAM STAIN, LEGIONELLA CULTURE, PNEUMOCYSTIS SMEAR BY DFA ([facility]), AFB CULTURE WITH STAIN Dayana Sheriff MD 9/14/2023 12:57 PM       Impression: BAL of the right middle lobe Endotracheal tube was retracted under direct visualization Tongue still appears swollen, vocal cords visualized outside of the endotracheal tube with the bronchoscope, no significant edema or mass noted RECOMMENDATION:  Follow-up infectious work-up      XR chest 1 view portable    Result Date: 9/13/2023  Narrative: CHEST INDICATION:   post intubation. COMPARISON: Same day chest radiograph and subsequent same day CT chest. Chest x-ray 8/27/2023. EXAM PERFORMED/VIEWS:  XR CHEST PORTABLE FINDINGS: Endotracheal tube terminates approximately 4 cm above the sujata. Heart shadow is enlarged but unchanged from prior exam. Bibasilar airspace opacities are again demonstrated. No appreciable pneumothorax. No evidence of acute osseous abnormality. Lucent lesion within T12 is better demonstrated on same-day CT. Impression: 1. Endotracheal tube terminates 4 cm above the sujata. 2. Redemonstration of bibasilar airspace opacities. Workstation performed: [id number]     XR chest 1 view portable    Result Date: 9/13/2023  Narrative: CHEST INDICATION:   post intubation, adjusting ET Tube. COMPARISON:  None EXAM PERFORMED/VIEWS:  XR CHEST PORTABLE FINDINGS: Endotracheal tube terminates approximately 3 cm above the sujata. Cardiomediastinal silhouette appears unremarkable. Bibasilar airspace opacities are again noted. No appreciable pneumothorax or pleural effusion. No evidence of acute osseous abnormality. Lucent lesion within T12 is better demonstrated on same-day CT. Impression: 1. Endotracheal tube terminates 3.3 cm above the sujata. 2. Bibasilar airspace opacities are again demonstrated. Workstation performed: [id number]     XR chest portable    Result Date: 9/13/2023  Narrative: CHEST INDICATION:   sob. COMPARISON: 8/27/2023. Subsequent CT chest performed the same day. EXAM PERFORMED/VIEWS:  XR CHEST PORTABLE FINDINGS: Heart shadow is enlarged but unchanged from prior exam. Hazy opacities are noted within the right lung base, possibly atelectasis versus pneumonia. Left basilar opacity is similar to prior radiograph. No appreciable pneumothorax. No evidence of acute osseous abnormality. Cystic lesion within the T12 vertebral body is better characterized on same-day CT. Impression: 1. Hazy bibasilar airspace opacities which may represent atelectasis versus pneumonia.  Left basilar opacity is similar to recent radiograph while right basilar opacity is new Workstation performed: [id number]     CTA ED chest PE Study    Result Date: 9/13/2023  Narrative: CTA - CHEST WITH IV CONTRAST - PULMONARY ANGIOGRAM INDICATION:   R/O PE. Reports shortness of breath since being discharged from the hospital 3 weeks ago for pneumonia. Fatigue. Productive cough with white sputum. Angioedema. COMPARISON: Chest radiograph 9/13/2023, chest CT 8/25/2023, thoracic spine CT 11/13/2013. TECHNIQUE: CT angiogram timed for optimal opacification of the pulmonary arteries. Axial, sagittal, and coronal 2D reformats created from source data. Coronal 3D MIP postprocessing on the acquisition scanner. Radiation dose length product (DLP):  472 mGy-cm . Radiation dose exposure minimized using iterative reconstruction and automated exposure control. IV Contrast:  85 mL of iohexol (OMNIPAQUE) FINDINGS: PULMONARY ARTERIES:  No pulmonary embolus. Moderate pulmonary artery enlargement. LUNGS: Mild patchy new consolidation in the medial segment right middle lobe. Improving opacity in the left upper lobe with mild residual atelectasis/scar. Redemonstration of mild dependent atelectasis in both lower lobes. Severe emphysema. AIRWAYS: No significant filling defects. ET tube 4 cm above the sujata. PLEURA:  Unremarkable. HEART/GREAT VESSELS: Moderate cardiomegaly. MEDIASTINUM AND PRASANTH: Slight regression of hilar and mediastinal lymphadenopathy. The largest node was previously 2.5 x 2.0 cm in the right infrahilar region and is now 1.9 x 1.3 cm (501/94). CHEST WALL AND LOWER NECK: Unremarkable. UPPER ABDOMEN: Right renal cysts. OSSEOUS STRUCTURES: Redemonstration of the large cystic lesion with sclerotic margins in T12 with destruction of the superior and inferior endplates, present as a much smaller thin-walled cystic lesion on the thoracic spine CT from 2013, imaged on a thoracic spine MRI from 2020. Impression: No pulmonary embolus. Patchy consolidation right middle lobe, new from 8/25/2023, compatible with pneumonia. Slight regression of previous hilar and mediastinal lymphadenopathy.  Improving left upper lobe opacity which may have been due to pneumonia with residual atelectasis/scar. Persistent partial atelectasis of the lower lobes. 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 Workstation performed: [id number]     Echo complete w/ contrast if indicated    Result Date: 8/28/2023  Narrative: •  Left Ventricle: Left ventricular cavity size is small. Wall thickness is increased. There is severe concentric hypertrophy. The left ventricular ejection fraction is 60%. Systolic function is normal. Wall motion is normal. Diastolic function is mildly abnormal, consistent with grade I (abnormal) relaxation. There is no LV dynamic obstruction at rest. •  Right Ventricle: Right ventricular cavity size is normal. Systolic function is normal. •  Left Atrium: The atrium is mildly dilated. •  Mitral Valve: There is mild to moderate regurgitation. There is systolic anterior motion of the chordal apparatus with late peaking gradient 29 mmHg during Valsalva maneuver. •  Pericardium: There is a trivial pericardial effusion along the right atrial free wall. XR chest portable ICU    Result Date: 8/28/2023  Narrative: CHEST INDICATION:   Acute hypoxic respiratory failure. COMPARISON: 8/25/2023 EXAM PERFORMED/VIEWS:  XR CHEST PORTABLE ICU FINDINGS: Heart shadow is enlarged but unchanged from prior exam. There is stable left-sided volume loss secondary to left basilar atelectasis. No pneumothorax or pleural effusion. Osseous structures appear within normal limits for patient age. Impression: Stable volume loss on the left secondary to left lower lobe atelectasis. Workstation performed: [id number]     VAS lower limb venous duplex study, complete bilateral    Result Date: 8/27/2023  Narrative:  [facility] REPORT CLINICAL: Indications: Patient presents with bilateral lower extremity pain x 1 days. Operative History: cardiac surgery-date unknown.  Risk Factors The patient has history of HTN and CKD. She has no history of Hyperlipidemia. CONCLUSION:  Impression: RIGHT LOWER LIMB: No evidence of acute or chronic deep vein thrombosis. No evidence of superficial thrombophlebitis noted. Doppler evaluation shows a normal response to augmentation maneuvers. . Popliteal, posterior tibial and anterior tibial arterial Doppler waveform's are triphasic. LEFT LOWER LIMB: No evidence of acute or chronic deep vein thrombosis. No evidence of superficial thrombophlebitis noted. Doppler evaluation shows a normal response to augmentation maneuvers. Popliteal, posterior tibial and anterior tibial arterial Doppler waveform's are triphasic. SIGNATURE: Electronically Signed by: Brooke Ramirez on 2023-08-27 08:12:52 PM    CTA ED chest PE Study    Result Date: 8/25/2023  Narrative: CTA - CHEST WITH IV CONTRAST - PULMONARY ANGIOGRAM INDICATION:   Increased SOB, recent COVID diagnosis. COMPARISON: MRI from 3/18/2020 as well as bone scan from 7/2/2019 and thoracic spine from 11/13/2013 TECHNIQUE: CTA examination of the chest was performed using angiographic technique according to a protocol specifically tailored to evaluate for pulmonary embolism. Multiplanar 2D reformatted images were created from the source data. In addition, coronal 3D MIP postprocessing was performed on the acquisition scanner. The study is limited by some respiratory motion artifacts especially in the lower lobes on the left where there is crowding of vessels due to atelectatic changes. Radiation dose length product (DLP) for this visit:  370 mGy-cm . This examination, like all CT scans performed in the [facility], was performed utilizing techniques to minimize radiation dose exposure, including the use of iterative reconstruction and automated exposure control.  IV Contrast:  80 mL of iohexol (OMNIPAQUE) FINDINGS: PULMONARY ARTERIAL TREE: Limited visualization left lower lobe however there is suspicion for a small embolus in the posterobasal segment on axial image 139, series 305 and coronal image 142. No definite filling defect is seen elsewhere. The main pulmonary artery is significantly dilated at 4.2 cm. The RV LV ratio is elevated at 1.1 cm. The primary pulmonary vascular stent minutes are also dilated chronicity is uncertain. LUNGS: Emphysema is noted with blebs at the apices. There is peripheral consolidation in the left upper lobe. Air bronchogram is not well seen and there is mucous occlusion of the left mainstem bronchus with volume loss of the left upper lobe as well as  left lower lobe to a lesser extent. Some aeration in the left lower lobe is still visible. PLEURA:  Unremarkable. HEART/GREAT VESSELS:  Unremarkable for patient's age. No thoracic aortic aneurysm. MEDIASTINUM AND PRASANTH: 10 mm pretracheal node is identified. 9 mm superior mediastinal node is identified. 10 mm prevascular node is identified. Subcarinal node measures 1.5 cm. Hilar adenopathy is also demonstrated. Right hilar node is larger measuring 1.8 cm in short axis on image 138. CHEST WALL AND LOWER NECK:   Unremarkable. VISUALIZED STRUCTURES IN THE UPPER ABDOMEN: Low-density cyst is seen in the right kidney. . OSSEOUS STRUCTURES: There appears to be a destructive lesion involving the T12 vertebral body eroding both endplates and much of the vertebral body this does not appear to represent a Schmorl's node. A cystic lesion was noted in 2013 at this location however the current lesion is larger with loss of endplates. MRI also imaged this lesion in 2020 the lesion appears somewhat larger on the current examination however. Impression: Limited study. There is equivocal embolus in the posterior basal segment left lower lobe. Other smaller segments are difficult to assess due to motion and vascular crowding. There is mucous plugging in the left mainstem bronchus with volume loss in portions of the left lower lobe and left upper lobe. Aspiration pneumonia is not excluded. There is dilatation of the main pulmonary artery and proximal pulmonary segment suggesting pulmonary hypertension this is more likely chronic than acute given the degree of distention. Emphysema is present. There is significant mediastinal and hilar adenopathy suggesting underlying neoplasm more likely than simple reactive nodes. Enlarging lesion at T12 is again demonstrated of uncertain etiology. This has been present since 2013. Perhaps a plasmacytoma is a consideration. Neoplastic work-up is advised. Pulmonology consultation is also advised to assess endobronchial obstruction on the left. This was discussed with resident physician Dr. Madisyn Jean Baptiste at 4:58 p.m. Follow-up was marked in the epic system Workstation performed: [id number]     XR chest 1 view portable    Result Date: 8/25/2023  Narrative: CHEST INDICATION:   SOB. COMPARISON: 8/21/2021 EXAM PERFORMED/VIEWS:  XR CHEST PORTABLE Single view FINDINGS: Development of CHF. Probable left basal infiltrate. Cardiomediastinal silhouette has become more enlarged. No pneumothorax or pleural effusion. Osseous structures appear within normal limits for patient age. Impression: Increased cardiomegaly. Development of CHF. Probable left basal infiltrate Workstation performed: [id number]       I have personally reviewed labs, imaging studies, and pertinent reports. This note has been generated by voice recognition software system. Therefore, there may be spelling, grammar, and or syntax errors. Please contact if questions arise.      Merly Xavier, DO   Hematology and Medical Oncology - PGY [place]

## 2023-09-28 NOTE — ASSESSMENT & PLAN NOTE
• POA with O2 saturate around 80% on room air.  Needed high flow 60 L to bring up her O2 saturation to 95%. • Recent hospitalization for respiratory failure second to pneumonia. • CTA PE study negative for PE.  New onset RML PNA. • Persistent partial atelectasis of the lower lobes noted. • Current daily smoker. • Patient had an event of desaturation last night with her oxygen level falling in high 70s and low 80s, patient was assessed by the night team and was given Lasix which led to resolution of her symptoms. • Pt saturation in 90's currently on 60L of O2   • PEG tube in place         Plan:  • Tracheostomy 9/17. Tracheal cuff in place  • Completed Decadron. • Atrovent/Xopenex  • Airway clearance protocol.  IS.  • Continue 3% saline nebs

## 2023-09-28 NOTE — ASSESSMENT & PLAN NOTE
Wt Readings from Last 3 Encounters:   09/28/23 74.9 kg (165 lb 2 oz)   09/07/23 74.6 kg (164 lb 6.4 oz)   08/30/23 73.3 kg (161 lb 9.6 oz)          • Volume status: Euvolemic. • POA physical (limited): JVD-, HJR-, B/L LE trace to 1+ edema. • Echo 8/28/23: LVEF 60%. D1DD. • CXR 9/22: Persistent pulmonary venous congestion with small effusions and bibasilar atelectasis. IV lasix given with even I/O.   • Patient had an event of desaturation last night with her oxygen level falling in high 70s and low 80s, patient was assessed by the night team and was given Lasix which led to resolution of her symptoms        Plan:  • CMP, magnesium; Goal Mg > 2 and K > 4; Replete prn  • HOB > 30°, Daily standing weights, Measure I/O  • Will continue holding diuresis

## 2023-09-28 NOTE — RESPIRATORY THERAPY NOTE
09/27/23 8124   Respiratory Assessment   Assessment Type Assess only   General Appearance Alert; Awake   Respiratory Pattern Shallow   Chest Assessment Chest expansion symmetrical   Bilateral Breath Sounds Coarse   Resp Comments pt place on High flow trach collar to keep SpO2 above 88%   Oxygen Therapy/Pulse Ox   O2 Device High flow nasal cannula  (with HF trach adaptor)   O2 Therapy Oxygen humidified   FiO2 (%) 70   O2 Flow Rate (L/min) 50 L/min   SpO2 95 %   SpO2 Activity At Rest

## 2023-09-28 NOTE — ASSESSMENT & PLAN NOTE
• POA with acute respiratory failure, SOB. • On physical in [facility] ED: Swollen tongue, swelling soft and hard palate and almost the head of all phalanx is completely closed. Severe angioedema. • Decadron 10 mg, Benadryl 25 mg given. • Initially refused but eventually agreed with intubation. • Prior episode of angioedema in 2020 noted. Suspected coadministration of increase the dose of tramadol and lisinopril. • Per daughter, there is no recent change in medications except Trelegy inhaler, cefdinir.   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        Plan:  • Trach placed 9/17  • Tolerating trach, No issue  • PEG tube in place

## 2023-09-29 LAB
ALBUMIN SERPL BCP-MCNC: 2.4 G/DL (ref 3.5–5)
ALP SERPL-CCNC: 46 U/L (ref 34–104)
ALT SERPL W P-5'-P-CCNC: 17 U/L (ref 7–52)
ANION GAP SERPL CALCULATED.3IONS-SCNC: 6 MMOL/L
AST SERPL W P-5'-P-CCNC: 11 U/L (ref 13–39)
BILIRUB SERPL-MCNC: 0.41 MG/DL (ref 0.2–1)
BNP SERPL-MCNC: 230 PG/ML (ref 0–100)
BUN SERPL-MCNC: 23 MG/DL (ref 5–25)
CALCIUM ALBUM COR SERPL-MCNC: 10 MG/DL (ref 8.3–10.1)
CALCIUM SERPL-MCNC: 8.7 MG/DL (ref 8.4–10.2)
CHLORIDE SERPL-SCNC: 96 MMOL/L (ref 96–108)
CO2 SERPL-SCNC: 32 MMOL/L (ref 21–32)
CREAT SERPL-MCNC: 0.77 MG/DL (ref 0.6–1.3)
ERYTHROCYTE [DISTWIDTH] IN BLOOD BY AUTOMATED COUNT: 15 % (ref 11.6–15.1)
GFR SERPL CREATININE-BSD FRML MDRD: 79 ML/MIN/1.73SQ M
GLUCOSE SERPL-MCNC: 126 MG/DL (ref 65–140)
HCT VFR BLD AUTO: 32.4 % (ref 34.8–46.1)
HGB BLD-MCNC: 10.3 G/DL (ref 11.5–15.4)
MCH RBC QN AUTO: 30.5 PG (ref 26.8–34.3)
MCHC RBC AUTO-ENTMCNC: 31.8 G/DL (ref 31.4–37.4)
MCV RBC AUTO: 96 FL (ref 82–98)
PLATELET # BLD AUTO: 127 THOUSANDS/UL (ref 149–390)
PMV BLD AUTO: 8.9 FL (ref 8.9–12.7)
POTASSIUM SERPL-SCNC: 3.6 MMOL/L (ref 3.5–5.3)
PROT SERPL-MCNC: 5.1 G/DL (ref 6.4–8.4)
RBC # BLD AUTO: 3.38 MILLION/UL (ref 3.81–5.12)
SODIUM SERPL-SCNC: 134 MMOL/L (ref 135–147)
WBC # BLD AUTO: 6.89 THOUSAND/UL (ref 4.31–10.16)

## 2023-09-29 PROCEDURE — 99232 SBSQ HOSP IP/OBS MODERATE 35: CPT | Performed by: INTERNAL MEDICINE

## 2023-09-29 PROCEDURE — 94760 N-INVAS EAR/PLS OXIMETRY 1: CPT

## 2023-09-29 PROCEDURE — 94668 MNPJ CHEST WALL SBSQ: CPT

## 2023-09-29 PROCEDURE — 94003 VENT MGMT INPAT SUBQ DAY: CPT

## 2023-09-29 PROCEDURE — 80053 COMPREHEN METABOLIC PANEL: CPT

## 2023-09-29 PROCEDURE — 85027 COMPLETE CBC AUTOMATED: CPT

## 2023-09-29 PROCEDURE — 94640 AIRWAY INHALATION TREATMENT: CPT

## 2023-09-29 PROCEDURE — 94669 MECHANICAL CHEST WALL OSCILL: CPT

## 2023-09-29 PROCEDURE — 83880 ASSAY OF NATRIURETIC PEPTIDE: CPT | Performed by: INTERNAL MEDICINE

## 2023-09-29 RX ORDER — FUROSEMIDE 10 MG/ML
40 INJECTION INTRAMUSCULAR; INTRAVENOUS ONCE
Status: COMPLETED | OUTPATIENT
Start: 2023-09-29 | End: 2023-09-29

## 2023-09-29 RX ADMIN — SERTRALINE HYDROCHLORIDE 50 MG: 50 TABLET ORAL at 08:45

## 2023-09-29 RX ADMIN — BUSPIRONE HYDROCHLORIDE 30 MG: 10 TABLET ORAL at 18:05

## 2023-09-29 RX ADMIN — SODIUM CHLORIDE SOLN NEBU 3% 4 ML: 3 NEBU SOLN at 14:08

## 2023-09-29 RX ADMIN — SODIUM CHLORIDE SOLN NEBU 3% 4 ML: 3 NEBU SOLN at 01:31

## 2023-09-29 RX ADMIN — GUAIFENESIN 200 MG: 200 SOLUTION ORAL at 02:15

## 2023-09-29 RX ADMIN — SODIUM CHLORIDE SOLN NEBU 3% 4 ML: 3 NEBU SOLN at 08:35

## 2023-09-29 RX ADMIN — BUSPIRONE HYDROCHLORIDE 30 MG: 10 TABLET ORAL at 08:47

## 2023-09-29 RX ADMIN — FAMOTIDINE 20 MG: 20 TABLET, FILM COATED ORAL at 17:26

## 2023-09-29 RX ADMIN — LEVALBUTEROL HYDROCHLORIDE 1.25 MG: 1.25 SOLUTION RESPIRATORY (INHALATION) at 14:08

## 2023-09-29 RX ADMIN — ENOXAPARIN SODIUM 40 MG: 40 INJECTION SUBCUTANEOUS at 08:44

## 2023-09-29 RX ADMIN — LISINOPRIL 40 MG: 20 TABLET ORAL at 08:44

## 2023-09-29 RX ADMIN — AMLODIPINE BESYLATE 10 MG: 5 TABLET ORAL at 08:45

## 2023-09-29 RX ADMIN — FLUCONAZOLE 400 MG: 100 TABLET ORAL at 08:45

## 2023-09-29 RX ADMIN — OXYCODONE HYDROCHLORIDE 5 MG: 5 SOLUTION ORAL at 05:56

## 2023-09-29 RX ADMIN — IPRATROPIUM BROMIDE 0.5 MG: 0.5 SOLUTION RESPIRATORY (INHALATION) at 08:36

## 2023-09-29 RX ADMIN — GABAPENTIN 300 MG: 300 CAPSULE ORAL at 20:03

## 2023-09-29 RX ADMIN — CARVEDILOL 12.5 MG: 12.5 TABLET, FILM COATED ORAL at 08:47

## 2023-09-29 RX ADMIN — GABAPENTIN 300 MG: 300 CAPSULE ORAL at 08:45

## 2023-09-29 RX ADMIN — BUSPIRONE HYDROCHLORIDE 30 MG: 10 TABLET ORAL at 22:00

## 2023-09-29 RX ADMIN — FUROSEMIDE 40 MG: 10 INJECTION, SOLUTION INTRAMUSCULAR; INTRAVENOUS at 10:46

## 2023-09-29 RX ADMIN — OXYCODONE HYDROCHLORIDE 5 MG: 5 SOLUTION ORAL at 14:30

## 2023-09-29 RX ADMIN — CHLORHEXIDINE GLUCONATE 15 ML: 1.2 SOLUTION ORAL at 08:45

## 2023-09-29 RX ADMIN — NICOTINE 21 MG: 21 PATCH, EXTENDED RELEASE TRANSDERMAL at 08:47

## 2023-09-29 RX ADMIN — IPRATROPIUM BROMIDE 0.5 MG: 0.5 SOLUTION RESPIRATORY (INHALATION) at 19:13

## 2023-09-29 RX ADMIN — GUAIFENESIN 200 MG: 200 SOLUTION ORAL at 14:30

## 2023-09-29 RX ADMIN — SULFAMETHOXAZOLE AND TRIMETHOPRIM 1 TABLET: 800; 160 TABLET ORAL at 08:45

## 2023-09-29 RX ADMIN — NYSTATIN 500000 UNITS: 100000 SUSPENSION ORAL at 17:26

## 2023-09-29 RX ADMIN — ALTEPLASE 2 MG: 2.2 INJECTION, POWDER, LYOPHILIZED, FOR SOLUTION INTRAVENOUS at 18:40

## 2023-09-29 RX ADMIN — IPRATROPIUM BROMIDE 0.5 MG: 0.5 SOLUTION RESPIRATORY (INHALATION) at 14:08

## 2023-09-29 RX ADMIN — LEVALBUTEROL HYDROCHLORIDE 1.25 MG: 1.25 SOLUTION RESPIRATORY (INHALATION) at 19:13

## 2023-09-29 RX ADMIN — FILGRASTIM-AAFI 300 MCG: 300 INJECTION, SOLUTION SUBCUTANEOUS at 09:07

## 2023-09-29 RX ADMIN — GUAIFENESIN 200 MG: 200 SOLUTION ORAL at 08:45

## 2023-09-29 RX ADMIN — GABAPENTIN 300 MG: 300 CAPSULE ORAL at 15:48

## 2023-09-29 RX ADMIN — OXYCODONE HYDROCHLORIDE 5 MG: 5 SOLUTION ORAL at 15:48

## 2023-09-29 RX ADMIN — CARVEDILOL 12.5 MG: 12.5 TABLET, FILM COATED ORAL at 15:48

## 2023-09-29 RX ADMIN — SODIUM CHLORIDE SOLN NEBU 3% 4 ML: 3 NEBU SOLN at 19:13

## 2023-09-29 RX ADMIN — ACYCLOVIR 400 MG: 200 CAPSULE ORAL at 08:50

## 2023-09-29 RX ADMIN — CHLORHEXIDINE GLUCONATE 15 ML: 1.2 SOLUTION ORAL at 20:03

## 2023-09-29 RX ADMIN — IPRATROPIUM BROMIDE 0.5 MG: 0.5 SOLUTION RESPIRATORY (INHALATION) at 01:31

## 2023-09-29 RX ADMIN — FAMOTIDINE 20 MG: 20 TABLET, FILM COATED ORAL at 08:44

## 2023-09-29 RX ADMIN — OXYCODONE HYDROCHLORIDE 5 MG: 5 SOLUTION ORAL at 22:40

## 2023-09-29 RX ADMIN — ACYCLOVIR 400 MG: 200 CAPSULE ORAL at 17:27

## 2023-09-29 RX ADMIN — NYSTATIN 500000 UNITS: 100000 SUSPENSION ORAL at 12:35

## 2023-09-29 RX ADMIN — LEVALBUTEROL HYDROCHLORIDE 1.25 MG: 1.25 SOLUTION RESPIRATORY (INHALATION) at 01:31

## 2023-09-29 RX ADMIN — GUAIFENESIN 200 MG: 200 SOLUTION ORAL at 20:03

## 2023-09-29 RX ADMIN — NYSTATIN 500000 UNITS: 100000 SUSPENSION ORAL at 08:45

## 2023-09-29 RX ADMIN — ALLOPURINOL 300 MG: 100 TABLET ORAL at 08:44

## 2023-09-29 RX ADMIN — ALTEPLASE 2 MG: 2.2 INJECTION, POWDER, LYOPHILIZED, FOR SOLUTION INTRAVENOUS at 08:37

## 2023-09-29 RX ADMIN — LEVOFLOXACIN 500 MG: 250 TABLET, FILM COATED ORAL at 08:45

## 2023-09-29 RX ADMIN — LEVALBUTEROL HYDROCHLORIDE 1.25 MG: 1.25 SOLUTION RESPIRATORY (INHALATION) at 08:35

## 2023-09-29 NOTE — CASE MANAGEMENT
Case Management Progress Note    Patient name Onesimo Krishnamurthy  Location ICU 03/ICU 03 MRN [id number]  : 1955 Date 2023       LOS (days): 16  Geometric Mean LOS (GMLOS) (days): 24.20  Days to GMLOS:8.3        OBJECTIVE:        Current admission status: Inpatient  Preferred Pharmacy:   [facility]/pharmacy #[id number]- LIZZY GARAY - [address]. [address] [place] [address]  Phone: [phone] Fax: [address] [address]  Phone: [phone] Fax: [phone]    Primary Care Provider: Gumaro Casper MD    Primary Insurance: [facility] REP  Secondary Insurance: [facility]    PROGRESS NOTE:    CM rounded with SLIM resident, Dr. Debi Pang, yesterday and today. Aware that facilities will need pt plan for Chemo OP prior to offering a bed. Dr. Debi Pang to f/u with Oncology with Chemo plan and notify CM.

## 2023-09-29 NOTE — ASSESSMENT & PLAN NOTE
• POA with acute respiratory failure, SOB. • On physical in [place] ED: Swollen tongue, swelling soft and hard palate and almost the head of all phalanx is completely closed. Severe angioedema. • Decadron 10 mg, Benadryl 25 mg given. • Initially refused but eventually agreed with intubation. • Prior episode of angioedema in 2020 noted. Suspected coadministration of increase the dose of tramadol and lisinopril. • Per daughter, there is no recent change in medications except Trelegy inhaler, cefdinir. 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   • IV Lasix 40 Mg received today        Plan:  • Trach placed 9/17  • Tolerating trach, No issue  • PEG tube in place

## 2023-09-29 NOTE — ASSESSMENT & PLAN NOTE
• Biopsy on 9/17: Large B-cell lymphoma, germinal center B-cell type, c-MYC and BCL-2 double expression. Ki-67 proliferation index is up to 90%; FISH & NGS pending. • Staging work-up: Currently stage II. • First inpatient chemotherapy 9/22/2023- with R-EPOCH. • Patient started on filgrastim     Plan:  • Inpatient hematology oncology following. Recommendation appreciated. • Outpatient follow-up with hematology oncology.   • Close TLS workup- uric acid, electrolytes  • Allopurinol as prophylaxis  • Neupogen  • PEG tube in place

## 2023-09-29 NOTE — OCCUPATIONAL THERAPY NOTE
Occupational Therapy Cancellation Note         Patient Name: Benjy Maloney  FSGIQ'V Date: 9/29/2023 09/29/23 1040   Note Type   Note type Cancelled Session   Cancel Reasons Refusal   Additional Comments Chart review completed. Per RN Chiquita pt is appropriate to see for therapy, however upon arrival to pt's room pt politely declining participation at this time, requesting that therapist allow her to rest today.  Will hold OT treatment per pt request. Will cont to follow and see as appropriate/able     Ayana Walker MS, OTR/L

## 2023-09-29 NOTE — UTILIZATION REVIEW
Please note, Pt is still in house as of today 9/29/2023    NOTIFICATION OF INPATIENT ADMISSION   AUTHORIZATION REQUEST   SERVICING FACILITY:   [address] [place] [address]  Tax ID: [id number]  NPI: [id number]   ATTENDING PROVIDER:  Attending Name and NPI#: Shameka Chiang Geoyair [[id number]]  Address: [address] [place] [address]  Phone: [phone]     ADMISSION INFORMATION:  Place of Service: Inpatient [address] [facility]  Place of Service Code: 21  Inpatient Admission Date/Time: 9/13/23  4:00 PM  Discharge Date/Time: No discharge date for patient encounter. Admitting Diagnosis Code/Description:  Angioedema, initial encounter [T78. 3XXA]     UTILIZATION REVIEW CONTACT:  Kenji Gómez Utilization   Network Utilization Review Department  Phone: [phone]  Fax: [phone]  Email: [email or web address]  Contact for approvals/pending authorizations, clinical reviews, and discharge. PHYSICIAN ADVISORY SERVICES:  Medical Necessity Denial & Fkig-vz-Hlnu Review  Phone: [phone]  Fax: [phone]  Email: [email or web address]

## 2023-09-29 NOTE — ASSESSMENT & PLAN NOTE
• 9/14 Neck/ soft tissue CT: Large enhancing soft tissue mass identified within the left neck involving multiple spaces. This appears to be centered within the left oropharynx with extensions as described above into multiple spaces. This results in significant airway compromise. 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 Small level 3 and level 4 nodes are seen within the neck. • Tracheostomy by ENT, bx & addition testing performed  • Bx: Positive for malignancy, favor poorly differentiated carcinoma. Cannot entirely exclude a high grade large cell lymphoma. Portion of specimen submitted for flow cytometry. • H/o tobacco abuse, daily smoker. • Daughter was updated at bedside. • Preliminary biopsy: Large B-cell lymphoma, germinal center B-cell type, c-MYC and BCL-2 double expression.    • First chemotherapy on 9/22.     Plan:  • ENT/ Med onc following, appreciated  • Follow final biopsy report  • Tracheal cuff in place, PEG tube in place  • Patient will follow oncology outpatient

## 2023-09-29 NOTE — ASSESSMENT & PLAN NOTE
Wt Readings from Last 3 Encounters:   09/28/23 74.9 kg (165 lb 2 oz)   09/07/23 74.6 kg (164 lb 6.4 oz)   08/30/23 73.3 kg (161 lb 9.6 oz)          • Volume status: Euvolemic. • POA physical (limited): JVD-, HJR-, B/L LE trace to 1+ edema. • Echo 8/28/23: LVEF 60%. D1DD. • CXR 9/22: Persistent pulmonary venous congestion with small effusions and bibasilar atelectasis. IV lasix given with even I/O.   • Patient had an event of desaturation last night with her oxygen level falling in high 70s and low 80s, patient was assessed by the night team and was given Lasix which led to resolution of her symptoms  •  received Lasix 40 Mg IV today  • I/Os = +1080        Plan:  • CMP, magnesium; Goal Mg > 2 and K > 4; Replete prn  • HOB > 30°, Daily standing weights, Measure I/O  • Will continue holding diuresis

## 2023-09-29 NOTE — SPEECH THERAPY NOTE
Speech Language/Pathology    Pt continues to require high flow trach collar to meet respiratory needs. Hold ST session today. ST will continue to follow and treat as clinically appropriate.

## 2023-09-29 NOTE — ASSESSMENT & PLAN NOTE
Lab Results   Component Value Date    EGFR 79 09/29/2023    EGFR 77 09/28/2023    EGFR 89 09/26/2023    CREATININE 0.77 09/29/2023    CREATININE 0.78 09/28/2023    CREATININE 0.68 09/26/2023   Stable at baseline,   Avoid nephrotoxic medications and fluctuations in blood pressure

## 2023-09-29 NOTE — PROGRESS NOTES
Progress Note - Wilma Bacon 69 y.o. female MRN: [id number]    Unit/Bed#: ICU 03 Encounter: [id number]      Assessment:  Ms. Elyssa Xiong is a 49-year-old female with newly diagnosed aggressive B-cell lymphoma MYC and Bcl-2 positive receiving treatment with dose adjusted R-EPOCH starting on 9/22/2023 through 9/27/2023 and we are following and monitoring closely. Plan:  1. Aggressive B-cell lymphoma with MYC and Bcl-2  She is status post chemotherapy for this round. Continue to watch blood counts. Monitor CBC with differential, comprehensive metabolic panel, tumor lysis labs daily. Continue supportive care with filgrastim as she will later 7 to 10 days after chemotherapy. Continue supportive care with transfusions for hemoglobin less than 7 and platelets less than 59,624. We will coordinate with primary team regarding discharge and disposition. She will need outpatient follow-up with medical oncology and she will also need to be scheduled for her next round of chemotherapy, which would be in the inpatient setting. Subjective:   She is doing okay today. Sitting up in bed watching TV. No complaints at this time. Objective:     Vitals: Blood pressure 119/61, pulse 86, temperature 99.1 °F (37.3 °C), temperature source Oral, resp. rate 17, height 5' 1" (1.549 m), weight 74.9 kg (165 lb 2 oz), SpO2 93 %. ,Body mass index is 31.2 kg/m².       Intake/Output Summary (Last 24 hours) at 9/29/2023 1716  Last data filed at 9/29/2023 1430  Gross per 24 hour   Intake 935 ml   Output 475 ml   Net 460 ml       Physical Exam: General appearance: alert and oriented, in no acute distress  Throat: lips, mucosa, and tongue normal; teeth and gums normal and with trach  Lungs: no respiratory distress, on vent  Abdomen: soft, non-tender; bowel sounds normal; no masses,  no organomegaly and nondistend  Extremities: extremities normal, warm and well-perfused; no cyanosis, clubbing, or edema  Pulses: 2+ and symmetric  Skin: Skin color, texture, turgor normal. No rashes or lesions     Invasive Devices     Peripherally Inserted Central Catheter Line  Duration           PICC Line 01/97/95 Right Basilic 8 days          Peripheral Intravenous Line  Duration           Long-Dwell Peripheral IV (Midline) 28/71/42 Left Basilic 13 days          Drain  Duration           Gastrostomy/Enterostomy Percutaneous Endoscopic Gastrostomy (PEG) 16 Fr. LUQ 2 days          Airway  Duration           Surgical Airway Shiley Uncuffed 12 days                Lab, Imaging and other studies: I have personally reviewed pertinent reports.     VTE Pharmacologic Prophylaxis: Enoxaparin (Lovenox)  VTE Mechanical Prophylaxis: sequential compression device     Bella Fair MD, PhD

## 2023-09-29 NOTE — PROGRESS NOTES
[address] [facility]  Progress Note  Name: Cat Godinez  MRN: [id number]  Unit/Bed#: ICU 03 I Date of Admission: 9/13/2023   Date of Service: 9/29/2023 I Hospital Day: 16    Assessment/Plan   * Acute respiratory failure with hypoxia secondary to oropharyngeal mass  Assessment & Plan  • POA with O2 saturate around 80% on room air.  Needed high flow 60 L to bring up her O2 saturation to 95%. • Recent hospitalization for respiratory failure second to pneumonia. • CTA PE study negative for PE.  New onset RML PNA. • Persistent partial atelectasis of the lower lobes noted. • Current daily smoker. • Patient had an event of desaturation last night with her oxygen level falling in high 70s and low 80s, patient was assessed by the night team and was given Lasix which led to resolution of her symptoms. • Pt saturation in 90's currently on 60L of O2   • PEG tube in place   • No desaturation events overnight        Plan:  • Tracheostomy 9/17. Tracheal cuff in place  • Completed Decadron. • Atrovent/Xopenex  • Airway clearance protocol. IS.  • Continue 3% saline nebs     Large cell lymphoma (HCC)  Assessment & Plan  • Biopsy on 9/17: Large B-cell lymphoma, germinal center B-cell type, c-MYC and BCL-2 double expression. Ki-67 proliferation index is up to 90%; FISH & NGS pending. • Staging work-up: Currently stage II. • First inpatient chemotherapy 9/22/2023- with R-EPOCH. • Patient started on filgrastim     Plan:  • Inpatient hematology oncology following. Recommendation appreciated. • Outpatient follow-up with hematology oncology. • Close TLS workup- uric acid, electrolytes  • Allopurinol as prophylaxis  • Neupogen  • PEG tube in place    Oropharyngeal mass  Assessment & Plan  • 9/14 Neck/ soft tissue CT: Large enhancing soft tissue mass identified within the left neck involving multiple spaces.  This appears to be centered within the left oropharynx with extensions as described above into multiple spaces. This results in significant airway compromise. 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 Small level 3 and level 4 nodes are seen within the neck. • Tracheostomy by ENT, bx & addition testing performed  • Bx: Positive for malignancy, favor poorly differentiated carcinoma. Cannot entirely exclude a high grade large cell lymphoma. Portion of specimen submitted for flow cytometry. • H/o tobacco abuse, daily smoker. • Daughter was updated at bedside. • Preliminary biopsy: Large B-cell lymphoma, germinal center B-cell type, c-MYC and BCL-2 double expression. • First chemotherapy on 9/22.     Plan:  • ENT/ Med onc following, appreciated  • Follow final biopsy report  • Tracheal cuff in place, PEG tube in place  • Patient will follow oncology outpatient    CKD (chronic kidney disease) stage 3, GFR 30-59 ml/min [facility])  Assessment & Plan  Lab Results   Component Value Date    EGFR 79 09/29/2023    EGFR 77 09/28/2023    EGFR 89 09/26/2023    CREATININE 0.77 09/29/2023    CREATININE 0.78 09/28/2023    CREATININE 0.68 09/26/2023   Stable at baseline,   Avoid nephrotoxic medications and fluctuations in blood pressure    Benign essential hypertension  Assessment & Plan  • Well controlled at home. • Home meds: Amlodipine 10 mg daily, Coreg 25 mg twice daily, lisinopril 10 mg daily. • Elevated BP may second to anxiety from chemotherapy/new diagnosis lymphoma, chronic pain.     Plan:  • Coreg 12.5  • Amlodipine 10 mg daily. • Lisinopril 40 mg daily. • PRN hydralazine if BP >160. • Consider increasing PO regimen given persistent hype    (HFpEF) heart failure with preserved ejection fraction [facility])  Assessment & Plan  Wt Readings from Last 3 Encounters:   09/28/23 74.9 kg (165 lb 2 oz)   09/07/23 74.6 kg (164 lb 6.4 oz)   08/30/23 73.3 kg (161 lb 9.6 oz)          • Volume status: Euvolemic. • POA physical (limited): JVD-, HJR-, B/L LE trace to 1+ edema. • Echo 8/28/23: LVEF 60%. D1DD.   • CXR 9/22: Persistent pulmonary venous congestion with small effusions and bibasilar atelectasis. IV lasix given with even I/O. • Patient had an event of desaturation last night with her oxygen level falling in high 70s and low 80s, patient was assessed by the night team and was given Lasix which led to resolution of her symptoms  •  received Lasix 40 Mg IV today  • I/Os = +1080        Plan:  • CMP, magnesium; Goal Mg > 2 and K > 4; Replete prn  • HOB > 30°, Daily standing weights, Measure I/O  • Will continue holding diuresis      Blister (nonthermal) of left forearm, sequela  Assessment & Plan  • 9/17/23: Blisters of LUE noted, no signs of infection  • Daily wound care, monitor signs of infection     Angioedema  Assessment & Plan  • POA with acute respiratory failure, SOB. • On physical in [place] ED: Swollen tongue, swelling soft and hard palate and almost the head of all phalanx is completely closed. Severe angioedema. • Decadron 10 mg, Benadryl 25 mg given. • Initially refused but eventually agreed with intubation. • Prior episode of angioedema in 2020 noted. Suspected coadministration of increase the dose of tramadol and lisinopril. • Per daughter, there is no recent change in medications except Trelegy inhaler, cefdinir. 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 • IV Lasix 40 Mg received today        Plan:  • Trach placed 9/17  • Tolerating trach, No issue  • PEG tube in place    Ambulatory dysfunction  Assessment & Plan  PT OT evaluation recommend postacute rehab  Fall precaution    Pain syndrome, chronic  Assessment & Plan  • Home regimen: Oxycodone 5 mg twice daily as needed. Tylenol 650 mg every 8 hours as needed. Gabapentin 600 mg 3 times daily. Medical marijuana. • Per daughter, patient was overusing Tylenol over-the-counter. • Pain mostly from lumbar radiculopathy.   • Impaired ambulatory dysfunction second to pain.     Plan:  • Continue gabapentin 300 3 times daily, scheduled oxycodone 5 mg 3 times daily, as needed Tylenol 650 mg every 6 hours. As needed fentanyl 50 mcg every 2 hours for severe pain    Hyperlipidemia  Assessment & Plan  Lab Results   Component Value Date     CHOLESTEROL 203 (H) 01/24/2023     CHOLESTEROL 177 08/11/2022     CHOLESTEROL 184 07/08/2020            Lab Results   Component Value Date     HDL 45 (L) 01/24/2023     HDL 37 (L) 08/11/2022     HDL 44 (L) 07/08/2020            Lab Results   Component Value Date     TRIG 166 (H) 09/16/2023     TRIG 160 (H) 01/24/2023     TRIG 108 08/11/2022            Lab Results   Component Value Date     NONHDLC 146 07/12/2018     NONHDLC 207 (H) 04/29/2013      Continue outpatient diet/ lifestyle modification                 VTE Pharmacologic Prophylaxis: VTE Score: 7 High Risk (Score >/= 5) - Pharmacological DVT Prophylaxis Ordered: enoxaparin (Lovenox). Sequential Compression Devices Ordered. Patient Centered Rounds: I performed bedside rounds with nursing staff today. Discussions with Specialists or Other Care Team Provider: senior resident and the attending     Education and Discussions with Family / Patient: Patient declined call to . Total Time Spent on Date of Encounter in care of patient: 25 mins. This time was spent on one or more of the following: performing physical exam; counseling and coordination of care; obtaining or reviewing history; documenting in the medical record; reviewing/ordering tests, medications or procedures; communicating with other healthcare professionals and discussing with patient's family/caregivers. Current Length of Stay: 16 day(s)  Current Patient Status: Inpatient   Certification Statement: The patient will continue to require additional inpatient hospital stay due to hpoxia   Discharge Plan: Anticipate discharge in 48-72 hrs to home with home services. Code Status: Level 1 - Full Code    Subjective:   Patient is currently on 6 L of oxygen with FiO2 of 60.   Patient had no events of desaturation overnight. Patient shows no shortness of breath, no labored breathing. Patient is in some distress due to abdominal pain mostly in left lower quadrant. Objective:     Vitals:   Temp (24hrs), Av.2 °F (37.3 °C), Min:98.9 °F (37.2 °C), Max:99.7 °F (37.6 °C)    Temp:  [98.9 °F (37.2 °C)-99.7 °F (37.6 °C)] 99.7 °F (37.6 °C)  HR:  [82-99] 99  Resp:  [11-37] 37  BP: ()/(55-75) 133/63  SpO2:  [86 %-100 %] 96 %  Body mass index is 31.2 kg/m². Input and Output Summary (last 24 hours): Intake/Output Summary (Last 24 hours) at 2023 1517  Last data filed at 2023 1430  Gross per 24 hour   Intake 1125 ml   Output 475 ml   Net 650 ml       Physical Exam:   [unfilled] Physical Exam  Constitutional:       Appearance: She is ill-appearing. HENT:      Head: Normocephalic. Right Ear: External ear normal.      Left Ear: External ear normal.      Nose: Nose normal.      Mouth/Throat:      Mouth: Mucous membranes are moist.      Comments: Carcinoma on the left side   Eyes:      Extraocular Movements: Extraocular movements intact. Conjunctiva/sclera: Conjunctivae normal.      Pupils: Pupils are equal, round, and reactive to light. Neck:      Comments: Tracheal cuff in place    Cardiovascular:      Rate and Rhythm: Normal rate and regular rhythm. Pulses: Normal pulses. Heart sounds: Normal heart sounds. Pulmonary:      Effort: Pulmonary effort is normal.      Breath sounds: Rales present. Abdominal:      General: Bowel sounds are normal.      Palpations: Abdomen is soft. Tenderness: There is abdominal tenderness. Comments: PEG Tube in place   Musculoskeletal:         General: Normal range of motion. Cervical back: Normal range of motion. Skin:     General: Skin is warm. Capillary Refill: Capillary refill takes less than 2 seconds. Neurological:      General: No focal deficit present.       Mental Status: She is alert and oriented to person, place, and time. Additional Data:     Labs:  Results from last 7 days   Lab Units 09/29/23  1051 09/28/23  0604 09/25/23  0539 09/24/23  0500 09/23/23  0529 09/23/23  0529   WBC Thousand/uL 6.89 8.01   < > 13.95*  --  9.72   HEMOGLOBIN g/dL 10.3* 11.4*   < > 11.0*  --  10.6*   HEMATOCRIT % 32.4* 34.8   < > 34.2*  --  32.8*   PLATELETS Thousands/uL 127* 158   < > 221  --  181   BANDS PCT %  --   --   --  2  --   --    NEUTROS PCT %  --   --   --   --   --  90*   LYMPHS PCT %  --   --   --   --   --  4*   LYMPHO PCT %  --  7*  --  4*   < >  --    MONOS PCT %  --   --   --   --   --  5   MONO PCT %  --  0*  --  0*   < >  --    EOS PCT %  --  0  --  0   < > 0    < > = values in this interval not displayed. Results from last 7 days   Lab Units 09/29/23  1051   SODIUM mmol/L 134*   POTASSIUM mmol/L 3.6   CHLORIDE mmol/L 96   CO2 mmol/L 32   BUN mg/dL 23   CREATININE mg/dL 0.77   ANION GAP mmol/L 6   CALCIUM mg/dL 8.7   ALBUMIN g/dL 2.4*   TOTAL BILIRUBIN mg/dL 0.41   ALK PHOS U/L 46   ALT U/L 17   AST U/L 11*   GLUCOSE RANDOM mg/dL 126     Results from last 7 days   Lab Units 09/27/23  0628   INR  0.96                   Lines/Drains:  Invasive Devices     Peripherally Inserted Central Catheter Line  Duration           PICC Line 19/19/63 Right Basilic 8 days          Peripheral Intravenous Line  Duration           Long-Dwell Peripheral IV (Midline) 46/12/83 Left Basilic 13 days          Drain  Duration           Gastrostomy/Enterostomy Percutaneous Endoscopic Gastrostomy (PEG) 16 Fr. LUQ 2 days          Airway  Duration           Surgical Airway Shiley Uncuffed 12 days                Central Line:  Goal for removal: N/A - Chronic PICC             Imaging: No pertinent imaging reviewed.     Recent Cultures (last 7 days):         Last 24 Hours Medication List:   Current Facility-Administered Medications   Medication Dose Route Frequency Provider Last Rate   • acetaminophen  650 mg Oral Q6H PRN Daysi Hernandes MD     • acyclovir  400 mg Oral BID Daysi Hernandes MD     • allopurinol  300 mg Oral Daily Trent Spencer MD     • alteplase  2 mg Intracatheter Q1MIN PRN Daysi Hernandes MD     • amLODIPine  10 mg Oral Daily Trent Spencer MD     • busPIRone  30 mg Oral TID Daysi Hernandes MD     • carvedilol  12.5 mg Oral BID With Meals eLanna Fowler MD     • chlorhexidine  15 mL Mouth/Throat Q12H [facility] Trent Spencer MD     • enoxaparin  40 mg Subcutaneous Q24H [facility] Trent Spencer MD     • famotidine  20 mg Oral BID Daysi Hernandes MD     • Filgrastim-aafi  300 mcg Subcutaneous Daily RANDEE Osborn     • fluconazole  400 mg Oral Daily Trent Spencer MD     • gabapentin  300 mg Oral TID Daysi Hernandes MD     • guaiFENesin  200 mg Oral Q6H Trent Spencer MD     • hydrALAZINE  10 mg Intravenous Q4H PRN Trent Spencer MD     • levalbuterol  1.25 mg Nebulization Q6H Trent Spencer MD      And   • ipratropium  0.5 mg Nebulization Q6H Trent Spencer MD     • levalbuterol  1.25 mg Nebulization Q8H PRN Daysi Hernandes MD     • levofloxacin  500 mg Oral Q24H [facility] Trent Spencer MD     • lidocaine 1% buffered   Infiltration PRN Martinez Light MD     • lisinopril  40 mg Oral Daily Leanna Fowler MD     • nicotine  21 mg Transdermal Daily Trent Spencer MD     • nystatin  500,000 Units Swish & Swallow 4x Daily Trent Spencer MD     • ondansetron  4 mg Intravenous Q8H PRN Trent Spencer MD     • oxyCODONE  2.5 mg Oral Q4H PRN Daysi Hernandes MD      Or   • oxyCODONE  5 mg Oral Q4H PRN Daysi Hernandes MD     • oxyCODONE  5 mg Oral Q8H Trent Spencer MD     • polyethylene glycol  17 g Oral Daily PRN Rae Dexter MD     • senna  8.8 mg Per NG Tube HS Trent Spencer MD     • sertraline  50 mg Oral Daily Trent Spencer MD     • sodium chloride  4 mL Nebulization Q6H Trent Spencer MD     • sulfamethoxazole-trimethoprim  1 tablet Oral Once per day on Mon Wed Fri Rae Dexter MD          Today, Patient Was Seen By: Mansi Matute MD    **Please Note: This note may have been constructed using a voice recognition system. **

## 2023-09-29 NOTE — ASSESSMENT & PLAN NOTE
• POA with O2 saturate around 80% on room air.  Needed high flow 60 L to bring up her O2 saturation to 95%. • Recent hospitalization for respiratory failure second to pneumonia. • CTA PE study negative for PE.  New onset RML PNA. • Persistent partial atelectasis of the lower lobes noted. • Current daily smoker. • Patient had an event of desaturation last night with her oxygen level falling in high 70s and low 80s, patient was assessed by the night team and was given Lasix which led to resolution of her symptoms. • Pt saturation in 90's currently on 60L of O2   • PEG tube in place   • No desaturation events overnight        Plan:  • Tracheostomy 9/17. Tracheal cuff in place  • Completed Decadron. • Atrovent/Xopenex  • Airway clearance protocol.  IS.  • Continue 3% saline nebs

## 2023-09-30 ENCOUNTER — APPOINTMENT (INPATIENT)
Dept: RADIOLOGY | Facility: HOSPITAL | Age: 68
DRG: 004 | End: 2023-09-30
Payer: COMMERCIAL

## 2023-09-30 ENCOUNTER — APPOINTMENT (INPATIENT)
Dept: CT IMAGING | Facility: HOSPITAL | Age: 68
DRG: 004 | End: 2023-09-30
Payer: COMMERCIAL

## 2023-09-30 PROBLEM — R10.84 GENERALIZED ABDOMINAL PAIN: Status: ACTIVE | Noted: 2023-09-30

## 2023-09-30 LAB
ALBUMIN SERPL BCP-MCNC: 2.5 G/DL (ref 3.5–5)
ALP SERPL-CCNC: 50 U/L (ref 34–104)
ALT SERPL W P-5'-P-CCNC: 16 U/L (ref 7–52)
ANION GAP SERPL CALCULATED.3IONS-SCNC: 6 MMOL/L
ANION GAP SERPL CALCULATED.3IONS-SCNC: 6 MMOL/L
AST SERPL W P-5'-P-CCNC: 11 U/L (ref 13–39)
BASE EX.OXY STD BLDV CALC-SCNC: 69.4 % (ref 60–80)
BASE EXCESS BLDV CALC-SCNC: 2.1 MMOL/L
BILIRUB SERPL-MCNC: 0.43 MG/DL (ref 0.2–1)
BUN SERPL-MCNC: 27 MG/DL (ref 5–25)
BUN SERPL-MCNC: 29 MG/DL (ref 5–25)
CALCIUM ALBUM COR SERPL-MCNC: 9.5 MG/DL (ref 8.3–10.1)
CALCIUM SERPL-MCNC: 8.3 MG/DL (ref 8.4–10.2)
CALCIUM SERPL-MCNC: 8.5 MG/DL (ref 8.4–10.2)
CHLORIDE SERPL-SCNC: 95 MMOL/L (ref 96–108)
CHLORIDE SERPL-SCNC: 95 MMOL/L (ref 96–108)
CO2 SERPL-SCNC: 33 MMOL/L (ref 21–32)
CO2 SERPL-SCNC: 34 MMOL/L (ref 21–32)
CREAT SERPL-MCNC: 0.97 MG/DL (ref 0.6–1.3)
CREAT SERPL-MCNC: 1.02 MG/DL (ref 0.6–1.3)
ERYTHROCYTE [DISTWIDTH] IN BLOOD BY AUTOMATED COUNT: 15.2 % (ref 11.6–15.1)
GFR SERPL CREATININE-BSD FRML MDRD: 56 ML/MIN/1.73SQ M
GFR SERPL CREATININE-BSD FRML MDRD: 59 ML/MIN/1.73SQ M
GLUCOSE SERPL-MCNC: 135 MG/DL (ref 65–140)
GLUCOSE SERPL-MCNC: 140 MG/DL (ref 65–140)
HCO3 BLDV-SCNC: 27.1 MMOL/L (ref 24–30)
HCT VFR BLD AUTO: 28.6 % (ref 34.8–46.1)
HFNC FLOW LPM: 60
HGB BLD-MCNC: 9.4 G/DL (ref 11.5–15.4)
LACTATE SERPL-SCNC: 1 MMOL/L (ref 0.5–2)
MCH RBC QN AUTO: 31.4 PG (ref 26.8–34.3)
MCHC RBC AUTO-ENTMCNC: 32.9 G/DL (ref 31.4–37.4)
MCV RBC AUTO: 96 FL (ref 82–98)
NON VENT HFNC FIO2: 90
NON VENT TYPE HFNC: ABNORMAL
O2 CT BLDV-SCNC: 8.6 ML/DL
PCO2 BLDV: 44.1 MM HG (ref 42–50)
PH BLDV: 7.41 [PH] (ref 7.3–7.4)
PLATELET # BLD AUTO: 100 THOUSANDS/UL (ref 149–390)
PMV BLD AUTO: 10.2 FL (ref 8.9–12.7)
PO2 BLDV: 38 MM HG (ref 35–45)
POTASSIUM SERPL-SCNC: 3.5 MMOL/L (ref 3.5–5.3)
POTASSIUM SERPL-SCNC: 3.6 MMOL/L (ref 3.5–5.3)
PROT SERPL-MCNC: 5.1 G/DL (ref 6.4–8.4)
RBC # BLD AUTO: 2.99 MILLION/UL (ref 3.81–5.12)
SODIUM SERPL-SCNC: 134 MMOL/L (ref 135–147)
SODIUM SERPL-SCNC: 135 MMOL/L (ref 135–147)
WBC # BLD AUTO: 1.18 THOUSAND/UL (ref 4.31–10.16)

## 2023-09-30 PROCEDURE — 94760 N-INVAS EAR/PLS OXIMETRY 1: CPT

## 2023-09-30 PROCEDURE — 94003 VENT MGMT INPAT SUBQ DAY: CPT

## 2023-09-30 PROCEDURE — 94669 MECHANICAL CHEST WALL OSCILL: CPT

## 2023-09-30 PROCEDURE — 82805 BLOOD GASES W/O2 SATURATION: CPT

## 2023-09-30 PROCEDURE — 94664 DEMO&/EVAL PT USE INHALER: CPT

## 2023-09-30 PROCEDURE — 99223 1ST HOSP IP/OBS HIGH 75: CPT | Performed by: INTERNAL MEDICINE

## 2023-09-30 PROCEDURE — 80053 COMPREHEN METABOLIC PANEL: CPT

## 2023-09-30 PROCEDURE — 87040 BLOOD CULTURE FOR BACTERIA: CPT | Performed by: NURSE PRACTITIONER

## 2023-09-30 PROCEDURE — G1004 CDSM NDSC: HCPCS

## 2023-09-30 PROCEDURE — 94668 MNPJ CHEST WALL SBSQ: CPT

## 2023-09-30 PROCEDURE — 83605 ASSAY OF LACTIC ACID: CPT | Performed by: NURSE PRACTITIONER

## 2023-09-30 PROCEDURE — 74177 CT ABD & PELVIS W/CONTRAST: CPT

## 2023-09-30 PROCEDURE — 85027 COMPLETE CBC AUTOMATED: CPT

## 2023-09-30 PROCEDURE — 71275 CT ANGIOGRAPHY CHEST: CPT

## 2023-09-30 PROCEDURE — 71045 X-RAY EXAM CHEST 1 VIEW: CPT

## 2023-09-30 PROCEDURE — 94640 AIRWAY INHALATION TREATMENT: CPT

## 2023-09-30 PROCEDURE — 80048 BASIC METABOLIC PNL TOTAL CA: CPT

## 2023-09-30 PROCEDURE — 99232 SBSQ HOSP IP/OBS MODERATE 35: CPT | Performed by: INTERNAL MEDICINE

## 2023-09-30 RX ORDER — FUROSEMIDE 10 MG/ML
40 INJECTION INTRAMUSCULAR; INTRAVENOUS ONCE
Status: COMPLETED | OUTPATIENT
Start: 2023-09-30 | End: 2023-09-30

## 2023-09-30 RX ORDER — HYDROMORPHONE HCL IN WATER/PF 6 MG/30 ML
0.2 PATIENT CONTROLLED ANALGESIA SYRINGE INTRAVENOUS EVERY 6 HOURS PRN
Status: DISCONTINUED | OUTPATIENT
Start: 2023-09-30 | End: 2023-10-11

## 2023-09-30 RX ORDER — ALBUMIN, HUMAN INJ 5% 5 %
12.5 SOLUTION INTRAVENOUS ONCE
Status: COMPLETED | OUTPATIENT
Start: 2023-09-30 | End: 2023-09-30

## 2023-09-30 RX ADMIN — OXYCODONE HYDROCHLORIDE 5 MG: 5 SOLUTION ORAL at 07:57

## 2023-09-30 RX ADMIN — ACYCLOVIR 400 MG: 200 CAPSULE ORAL at 08:16

## 2023-09-30 RX ADMIN — AMLODIPINE BESYLATE 10 MG: 5 TABLET ORAL at 08:03

## 2023-09-30 RX ADMIN — ALLOPURINOL 300 MG: 100 TABLET ORAL at 08:03

## 2023-09-30 RX ADMIN — NYSTATIN 500000 UNITS: 100000 SUSPENSION ORAL at 11:32

## 2023-09-30 RX ADMIN — GUAIFENESIN 200 MG: 200 SOLUTION ORAL at 08:03

## 2023-09-30 RX ADMIN — FILGRASTIM-AAFI 300 MCG: 300 INJECTION, SOLUTION SUBCUTANEOUS at 11:15

## 2023-09-30 RX ADMIN — LEVALBUTEROL HYDROCHLORIDE 1.25 MG: 1.25 SOLUTION RESPIRATORY (INHALATION) at 02:29

## 2023-09-30 RX ADMIN — CARVEDILOL 12.5 MG: 12.5 TABLET, FILM COATED ORAL at 07:58

## 2023-09-30 RX ADMIN — OXYCODONE HYDROCHLORIDE 5 MG: 5 SOLUTION ORAL at 04:45

## 2023-09-30 RX ADMIN — BUSPIRONE HYDROCHLORIDE 30 MG: 10 TABLET ORAL at 17:47

## 2023-09-30 RX ADMIN — ACYCLOVIR 400 MG: 200 CAPSULE ORAL at 17:00

## 2023-09-30 RX ADMIN — GABAPENTIN 300 MG: 300 CAPSULE ORAL at 21:15

## 2023-09-30 RX ADMIN — OXYCODONE HYDROCHLORIDE 5 MG: 5 SOLUTION ORAL at 21:15

## 2023-09-30 RX ADMIN — GUAIFENESIN 200 MG: 200 SOLUTION ORAL at 21:15

## 2023-09-30 RX ADMIN — BUSPIRONE HYDROCHLORIDE 30 MG: 10 TABLET ORAL at 08:04

## 2023-09-30 RX ADMIN — NYSTATIN 500000 UNITS: 100000 SUSPENSION ORAL at 21:15

## 2023-09-30 RX ADMIN — BUSPIRONE HYDROCHLORIDE 30 MG: 10 TABLET ORAL at 21:18

## 2023-09-30 RX ADMIN — Medication 8.8 MG: at 02:28

## 2023-09-30 RX ADMIN — IOHEXOL 100 ML: 350 INJECTION, SOLUTION INTRAVENOUS at 17:38

## 2023-09-30 RX ADMIN — GUAIFENESIN 200 MG: 200 SOLUTION ORAL at 02:28

## 2023-09-30 RX ADMIN — LEVALBUTEROL HYDROCHLORIDE 1.25 MG: 1.25 SOLUTION RESPIRATORY (INHALATION) at 14:10

## 2023-09-30 RX ADMIN — CHLORHEXIDINE GLUCONATE 15 ML: 1.2 SOLUTION ORAL at 08:03

## 2023-09-30 RX ADMIN — GABAPENTIN 300 MG: 300 CAPSULE ORAL at 17:00

## 2023-09-30 RX ADMIN — LEVOFLOXACIN 500 MG: 250 TABLET, FILM COATED ORAL at 08:03

## 2023-09-30 RX ADMIN — SODIUM CHLORIDE SOLN NEBU 3% 4 ML: 3 NEBU SOLN at 14:11

## 2023-09-30 RX ADMIN — CHLORHEXIDINE GLUCONATE 15 ML: 1.2 SOLUTION ORAL at 21:15

## 2023-09-30 RX ADMIN — LEVALBUTEROL HYDROCHLORIDE 1.25 MG: 1.25 SOLUTION RESPIRATORY (INHALATION) at 20:00

## 2023-09-30 RX ADMIN — ALBUMIN (HUMAN) 12.5 G: 12.5 INJECTION, SOLUTION INTRAVENOUS at 14:25

## 2023-09-30 RX ADMIN — LEVALBUTEROL HYDROCHLORIDE 1.25 MG: 1.25 SOLUTION RESPIRATORY (INHALATION) at 08:32

## 2023-09-30 RX ADMIN — GABAPENTIN 300 MG: 300 CAPSULE ORAL at 08:03

## 2023-09-30 RX ADMIN — SODIUM CHLORIDE SOLN NEBU 3% 4 ML: 3 NEBU SOLN at 20:00

## 2023-09-30 RX ADMIN — IPRATROPIUM BROMIDE 0.5 MG: 0.5 SOLUTION RESPIRATORY (INHALATION) at 14:10

## 2023-09-30 RX ADMIN — FUROSEMIDE 40 MG: 10 INJECTION, SOLUTION INTRAMUSCULAR; INTRAVENOUS at 14:32

## 2023-09-30 RX ADMIN — SERTRALINE HYDROCHLORIDE 50 MG: 50 TABLET ORAL at 08:03

## 2023-09-30 RX ADMIN — NYSTATIN 500000 UNITS: 100000 SUSPENSION ORAL at 08:03

## 2023-09-30 RX ADMIN — FAMOTIDINE 20 MG: 20 TABLET, FILM COATED ORAL at 08:04

## 2023-09-30 RX ADMIN — GUAIFENESIN 200 MG: 200 SOLUTION ORAL at 14:15

## 2023-09-30 RX ADMIN — FLUCONAZOLE 400 MG: 100 TABLET ORAL at 08:03

## 2023-09-30 RX ADMIN — LISINOPRIL 40 MG: 20 TABLET ORAL at 08:03

## 2023-09-30 RX ADMIN — IPRATROPIUM BROMIDE 0.5 MG: 0.5 SOLUTION RESPIRATORY (INHALATION) at 02:29

## 2023-09-30 RX ADMIN — SODIUM CHLORIDE SOLN NEBU 3% 4 ML: 3 NEBU SOLN at 02:29

## 2023-09-30 RX ADMIN — IPRATROPIUM BROMIDE 0.5 MG: 0.5 SOLUTION RESPIRATORY (INHALATION) at 20:00

## 2023-09-30 RX ADMIN — NYSTATIN 500000 UNITS: 100000 SUSPENSION ORAL at 17:00

## 2023-09-30 RX ADMIN — ENOXAPARIN SODIUM 40 MG: 40 INJECTION SUBCUTANEOUS at 08:04

## 2023-09-30 RX ADMIN — OXYCODONE HYDROCHLORIDE 5 MG: 5 SOLUTION ORAL at 14:16

## 2023-09-30 RX ADMIN — SODIUM CHLORIDE SOLN NEBU 3% 4 ML: 3 NEBU SOLN at 08:32

## 2023-09-30 RX ADMIN — IPRATROPIUM BROMIDE 0.5 MG: 0.5 SOLUTION RESPIRATORY (INHALATION) at 08:32

## 2023-09-30 RX ADMIN — NICOTINE 21 MG: 21 PATCH, EXTENDED RELEASE TRANSDERMAL at 08:03

## 2023-09-30 RX ADMIN — FAMOTIDINE 20 MG: 20 TABLET, FILM COATED ORAL at 17:00

## 2023-09-30 NOTE — ASSESSMENT & PLAN NOTE
Lab Results   Component Value Date    CHOLESTEROL 203 (H) 01/24/2023    CHOLESTEROL 177 08/11/2022    CHOLESTEROL 184 07/08/2020     Lab Results   Component Value Date    HDL 45 (L) 01/24/2023    HDL 37 (L) 08/11/2022    HDL 44 (L) 07/08/2020     Lab Results   Component Value Date    TRIG 166 (H) 09/16/2023    TRIG 160 (H) 01/24/2023    TRIG 108 08/11/2022     Lab Results   Component Value Date    NONHDLC 146 07/12/2018    [facility] 207 (H) 04/29/2013     Continue outpatient diet/ lifestyle modification.

## 2023-09-30 NOTE — QUICK NOTE
[facility] Att[facility] - Progress Note    I have seen and examined [facility] personally and have reviewed the medical record independently. I have reviewed the case with the resident physician including all assessments and the plan of care for each. I agree with the resident physician and offer the following addendum to the below statements by the resident physician:     70-year-old female initially admitted to ICU on 9/13 due to tongue swelling and required intubation for airway protection. 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 S/P Tracheostomy and PEG. Status post chemotherapy (R-EPOCH)     Pt does not appear in acute respiratory distress however she continues to require HFNC overnight with intermittent desatuation. She has been refusing suctioning. Complaining of PEG tube site pain. Abd soft on exam. Unclear the etiology of hypoxia. Will obtain CT chest and abd r/o PE, mucous plugging or intra-abdominal process etc though felt less likely. O2 sat seems to improve after taking deep breath possibly atelectasis playing a role. New leucopenia  2/2 chemo, receiving filgrastim day 3. Monitor ANC. Continue acyclovir, Fluconazole, bactrim and Levaquin for  Prophylaxis. Low threshold for broadening iv abx if she becomes febrile.  Given pt is full code, will discuss with CC about higher level care for close monitoring       For detailed history, assessment, and plan of care, please review the statements below by Dr. Luther Martin MD

## 2023-09-30 NOTE — ASSESSMENT & PLAN NOTE
Denies generalized abdominal pain however abdominal is more tender towards the left lower quadrant  This pain has started after the placement of the PEG tube    PL AN  CT of the abdomen  Dilaudid 0.2 IV  Acetaminophen   oxycodone

## 2023-09-30 NOTE — PROGRESS NOTES
[facility]  Progress Note  Name: Sigifredo Espinoza  MRN: [id number]  Unit/Bed#: ICU 03 I Date of Admission: 9/13/2023   Date of Service: 9/30/2023 I Hospital Day: 17    Assessment/Plan   * Acute respiratory failure with hypoxia secondary to oropharyngeal mass  Assessment & Plan  • POA with O2 saturate around 80% on room air.  Needed high flow 60 L to bring up her O2 saturation to 95%. • Recent hospitalization for respiratory failure second to pneumonia. • CTA PE study negative for PE.  New onset RML PNA. • Persistent partial atelectasis of the lower lobes noted. • Current daily smoker. • Patient had an event of desaturation last night with her oxygen level falling in high 70s and low 80s, patient was assessed by the night team and was given Lasix which led to resolution of her symptoms. • Pt saturation is in the 90s on 90 L of oxygen  • PEG tube in place   • Patient had a desaturation episode at night, and in the morning which she desaturated as low as 69%. Patient oxygen was brought up by suctioning. However patient has been refusing regular suctioning of the tracheal.        Plan:  • Stat CT abdomen, chest and pelvis   • tracheostomy 9/17. Tracheal cuff in place  • Completed Decadron. • Atrovent/Xopenex  • Airway clearance protocol. IS.  • Continue 3% saline nebs     Generalized abdominal pain  Assessment & Plan  Denies generalized abdominal pain however abdominal is more tender towards the left lower quadrant  This pain has started after the placement of the PEG tube    PL AN  CT of the abdomen  Dilaudid 0.2 IV  Acetaminophen   oxycodone      Large cell lymphoma (HCC)  Assessment & Plan  • Biopsy on 9/17: Large B-cell lymphoma, germinal center B-cell type, c-MYC and BCL-2 double expression. Ki-67 proliferation index is up to 90%; FISH & NGS pending. • Staging work-up: Currently stage II. • First inpatient chemotherapy 9/22/2023- with R-EPOCH.   • Patient started on filgrastim  • WBC levels are at 1.18 today     Plan:  Neutropenic precautions  • Inpatient hematology oncology following. Recommendation appreciated. • Outpatient follow-up with hematology oncology. • Close TLS workup- uric acid, electrolytes  • Allopurinol as prophylaxis  • Neupogen  • PEG tube in place    Oropharyngeal mass  Assessment & Plan  • 9/14 Neck/ soft tissue CT: Large enhancing soft tissue mass identified within the left neck involving multiple spaces. This appears to be centered within the left oropharynx with extensions as described above into multiple spaces. This results in significant airway compromise. 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 Small level 3 and level 4 nodes are seen within the neck. • Tracheostomy by ENT, bx & addition testing performed  • Bx: Positive for malignancy, favor poorly differentiated carcinoma. Cannot entirely exclude a high grade large cell lymphoma. Portion of specimen submitted for flow cytometry. • H/o tobacco abuse, daily smoker. • Daughter was updated at bedside. • Preliminary biopsy: Large B-cell lymphoma, germinal center B-cell type, c-MYC and BCL-2 double expression. • First chemotherapy on 9/22. • WBC- 1.18      Plan:  Neutropenic precautions  • ENT/ Med onc following, appreciated  • Follow final biopsy report  • Tracheal cuff in place, PEG tube in place  • Patient will follow oncology outpatient    CKD (chronic kidney disease) stage 3, GFR 30-59 ml/min [facility])  Assessment & Plan  Lab Results   Component Value Date    EGFR 59 09/30/2023    EGFR 79 09/29/2023    EGFR 77 09/28/2023    CREATININE 0.97 09/30/2023    CREATININE 0.77 09/29/2023    CREATININE 0.78 09/28/2023   Stable at baseline,   Avoid nephrotoxic medications and fluctuations in blood pressure    Benign essential hypertension  Assessment & Plan  • Well controlled at home. • Home meds: Amlodipine 10 mg daily, Coreg 25 mg twice daily, lisinopril 10 mg daily.   • Elevated BP may second to anxiety from chemotherapy/new diagnosis lymphoma, chronic pain.     Plan:  • Coreg 12.5  • Amlodipine 10 mg daily. • Lisinopril 40 mg daily. • PRN hydralazine if BP >160. • Consider increasing PO regimen given persistent hype    (HFpEF) heart failure with preserved ejection fraction [facility])  Assessment & Plan  Wt Readings from Last 3 Encounters:   09/30/23 76 kg (167 lb 8.8 oz)   09/07/23 74.6 kg (164 lb 6.4 oz)   08/30/23 73.3 kg (161 lb 9.6 oz)          • Volume status: Euvolemic. • POA physical (limited): JVD-, HJR-, B/L LE trace to 1+ edema. • Echo 8/28/23: LVEF 60%. D1DD. • CXR 9/22: Persistent pulmonary venous congestion with small effusions and bibasilar atelectasis. IV lasix given with even I/O. • Patient had an event of desaturation last night with her oxygen level falling in high 70s and low 80s, patient was assessed by the night team and was given Lasix which led to resolution of her symptoms  I/Os = +636      Plan:  • CMP, magnesium; Goal Mg > 2 and K > 4; Replete prn  • HOB > 30°, Daily standing weights, Measure I/O  • Will continue holding diuresis      Blister (nonthermal) of left forearm, sequela  Assessment & Plan  • 9/17/23: Blisters of LUE noted, no signs of infection  • Daily wound care, monitor signs of infection     Angioedema  Assessment & Plan  • POA with acute respiratory failure, SOB. • On physical in [place] ED: Swollen tongue, swelling soft and hard palate and almost the head of all phalanx is completely closed. Severe angioedema. • Decadron 10 mg, Benadryl 25 mg given. • Initially refused but eventually agreed with intubation. • Prior episode of angioedema in 2020 noted. Suspected coadministration of increase the dose of tramadol and lisinopril. • Per daughter, there is no recent change in medications except Trelegy inhaler, cefdinir.   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          Plan:  • Trach placed 9/17  • Tolerating trach, No issue  • PEG tube in place    Ambulatory dysfunction  Assessment & Plan  PT OT evaluation recommend postacute rehab  Fall precaution    Pain syndrome, chronic  Assessment & Plan  • Home regimen: Oxycodone 5 mg twice daily as needed. Tylenol 650 mg every 8 hours as needed. Gabapentin 600 mg 3 times daily. Medical marijuana. • Per daughter, patient was overusing Tylenol over-the-counter. • Pain mostly from lumbar radiculopathy. • Impaired ambulatory dysfunction second to pain.     Plan:  dilauded 0.2 iv  • Continue gabapentin 300 3 times daily, scheduled oxycodone 5 mg 3 times daily, as needed Tylenol 650 mg every 6 hours. As needed fentanyl 50 mcg every 2 hours for severe pain    Hyperlipidemia  Assessment & Plan  Lab Results   Component Value Date     CHOLESTEROL 203 (H) 01/24/2023     CHOLESTEROL 177 08/11/2022     CHOLESTEROL 184 07/08/2020            Lab Results   Component Value Date     HDL 45 (L) 01/24/2023     HDL 37 (L) 08/11/2022     HDL 44 (L) 07/08/2020            Lab Results   Component Value Date     TRIG 166 (H) 09/16/2023     TRIG 160 (H) 01/24/2023     TRIG 108 08/11/2022            Lab Results   Component Value Date     NONHDLC 146 07/12/2018     NONHDLC 207 (H) 04/29/2013      Continue outpatient diet/ lifestyle modification               VTE Pharmacologic Prophylaxis: VTE Score: 7 High Risk (Score >/= 5) - Pharmacological DVT Prophylaxis Ordered: enoxaparin (Lovenox). Sequential Compression Devices Ordered. Patient Centered Rounds: I performed bedside rounds with nursing staff today. Discussions with Specialists or Other Care Team Provider: The resident and the attending    Education and Discussions with Family / Patient: Patient declined call to . Total Time Spent on Date of Encounter in care of patient: 35 mins.  This time was spent on one or more of the following: performing physical exam; counseling and coordination of care; obtaining or reviewing history; documenting in the medical record; reviewing/ordering tests, medications or procedures; communicating with other healthcare professionals and discussing with patient's family/caregivers. Current Length of Stay: 17 day(s)  Current Patient Status: Inpatient   Certification Statement: The patient will continue to require additional inpatient hospital stay due to hpoxia  Discharge Plan: Anticipate discharge in 48-72 hrs to home with home services. Code Status: Level 1 - Full Code    Subjective:   Patient was in acute distress, was saturating as low as 69. Patient is refusing to receive any treatment including suctioning. Patient has tender pain around the abdominal area especially in the right lower quadrant. Objective:     Vitals:   Temp (24hrs), Av °F (37.2 °C), Min:98.5 °F (36.9 °C), Max:99.3 °F (37.4 °C)    Temp:  [98.5 °F (36.9 °C)-99.3 °F (37.4 °C)] 99.1 °F (37.3 °C)  HR:  [84-98] 94  Resp:  [12-21] 15  BP: ()/(52-63) 114/60  SpO2:  [86 %-97 %] 86 %  Body mass index is 31.66 kg/m². Input and Output Summary (last 24 hours): Intake/Output Summary (Last 24 hours) at 2023 1245  Last data filed at 2023 1126  Gross per 24 hour   Intake 657 ml   Output 350 ml   Net 307 ml       Physical Exam:   [unfilled] Physical Exam  Constitutional:       General: She is in acute distress. Appearance: She is ill-appearing. HENT:      Head: Normocephalic and atraumatic. Right Ear: External ear normal.      Left Ear: External ear normal.      Nose: Nose normal.      Mouth/Throat:      Mouth: Mucous membranes are moist.      Pharynx: Oropharynx is clear. Comments: Carcinoma on the left side   Eyes:      Extraocular Movements: Extraocular movements intact. Conjunctiva/sclera: Conjunctivae normal.      Pupils: Pupils are equal, round, and reactive to light. Neck:      Comments: Tracheal cuff in place    Cardiovascular:      Rate and Rhythm: Normal rate and regular rhythm. Pulses: Normal pulses. Heart sounds: Normal heart sounds. Pulmonary:      Effort: Pulmonary effort is normal.      Breath sounds: Rales present. Abdominal:      General: Bowel sounds are normal.      Palpations: Abdomen is soft. Tenderness: There is abdominal tenderness. Comments: PEG Tube in place   Musculoskeletal:         General: Normal range of motion. Cervical back: Normal range of motion. Skin:     General: Skin is warm. Capillary Refill: Capillary refill takes less than 2 seconds. Neurological:      General: No focal deficit present. Mental Status: She is alert and oriented to person, place, and time. Additional Data:     Labs:  Results from last 7 days   Lab Units 09/30/23  0439 09/29/23  1051 09/28/23  0604 09/25/23  0539 09/24/23  0500   WBC Thousand/uL 1.18*   < > 8.01   < > 13.95*   HEMOGLOBIN g/dL 9.4*   < > 11.4*   < > 11.0*   HEMATOCRIT % 28.6*   < > 34.8   < > 34.2*   PLATELETS Thousands/uL 100*   < > 158   < > 221   BANDS PCT %  --   --   --   --  2   LYMPHO PCT %  --   --  7*  --  4*   MONO PCT %  --   --  0*  --  0*   EOS PCT %  --   --  0  --  0    < > = values in this interval not displayed.      Results from last 7 days   Lab Units 09/30/23  0439   SODIUM mmol/L 134*   POTASSIUM mmol/L 3.5   CHLORIDE mmol/L 95*   CO2 mmol/L 33*   BUN mg/dL 29*   CREATININE mg/dL 0.97   ANION GAP mmol/L 6   CALCIUM mg/dL 8.3*   ALBUMIN g/dL 2.5*   TOTAL BILIRUBIN mg/dL 0.43   ALK PHOS U/L 50   ALT U/L 16   AST U/L 11*   GLUCOSE RANDOM mg/dL 140     Results from last 7 days   Lab Units 09/27/23  0628   INR  0.96                   Lines/Drains:  Invasive Devices     Peripherally Inserted Central Catheter Line  Duration           PICC Line 78/17/97 Right Basilic 8 days          Peripheral Intravenous Line  Duration           Long-Dwell Peripheral IV (Midline) 05/25/18 Left Basilic 14 days          Drain  Duration           Gastrostomy/Enterostomy Percutaneous Endoscopic Gastrostomy (PEG) 16 Fr. LUQ 2 days          Airway  Duration           Surgical Airway Federico Uncuffed 13 days                Central Line:  Goal for removal: N/A - Chronic PICC             Imaging: No pertinent imaging reviewed.     Recent Cultures (last 7 days):         Last 24 Hours Medication List:   Current Facility-Administered Medications   Medication Dose Route Frequency Provider Last Rate   • acetaminophen  650 mg Oral Q6H PRN Susu Rodriguez MD     • acyclovir  400 mg Oral BID Susu Rodriguez MD     • allopurinol  300 mg Oral Daily Trent Spencer MD     • alteplase  2 mg Intracatheter Q1MIN PRN Susu Rodriguez MD     • amLODIPine  10 mg Oral Daily Trent Spencer MD     • busPIRone  30 mg Oral TID Susu Rodriguez MD     • carvedilol  12.5 mg Oral BID With Meals Anna Agudelo MD     • chlorhexidine  15 mL Mouth/Throat Q12H [facility] Trent Spencer MD     • enoxaparin  40 mg Subcutaneous Q24H [facility] Trent Spencer MD     • famotidine  20 mg Oral BID Susu Rodriguez MD     • Filgrastim-aafi  300 mcg Subcutaneous Daily RANDEE Haynes     • fluconazole  400 mg Oral Daily Trent Spencer MD     • gabapentin  300 mg Oral TID Susu Rodriguez MD     • guaiFENesin  200 mg Oral Q6H Trent Spencer MD     • hydrALAZINE  10 mg Intravenous Q4H PRN Trent Spencer MD     • HYDROmorphone  0.2 mg Intravenous Q6H PRN Ольга Guevara MD     • levalbuterol  1.25 mg Nebulization Q6H Trent Spencer MD      And   • ipratropium  0.5 mg Nebulization Q6H Trent Spencer MD     • levalbuterol  1.25 mg Nebulization Q8H PRN Susu Rodriguez MD     • levofloxacin  500 mg Oral Q24H [facility] Trent Spencer MD     • lidocaine 1% buffered   Infiltration PRN Dinorah Tijerina MD     • lisinopril  40 mg Oral Daily Anna Osborn Jeni Bustamante MD     • nicotine  21 mg Transdermal Daily Daysi Hernandes MD     • nystatin  500,000 Units Swish & Swallow 4x Daily Trent Spencer MD     • ondansetron  4 mg Intravenous Q8H PRN Trent Spencer MD     • oxyCODONE  2.5 mg Oral Q4H PRN Daysi Hernandes MD      Or   • oxyCODONE  5 mg Oral Q4H PRN Daysi Hernandes MD     • oxyCODONE  5 mg Oral Q8H Trent Spencer MD     • polyethylene glycol  17 g Oral Daily PRN Daysi Hernandes MD     • senna  8.8 mg Per NG Tube HS Trent Spencer MD     • sertraline  50 mg Oral Daily Trent Spencer MD     • sodium chloride  4 mL Nebulization Q6H Trent Spencer MD     • sulfamethoxazole-trimethoprim  1 tablet Oral Once per day on Mon Wed Fri Daysi Hernandes MD          Today, Patient Was Seen By: Fermin Aguirre MD    **Please Note: This note may have been constructed using a voice recognition system. **

## 2023-09-30 NOTE — ASSESSMENT & PLAN NOTE
Lab Results   Component Value Date     CHOLESTEROL 203 (H) 01/24/2023     CHOLESTEROL 177 08/11/2022     CHOLESTEROL 184 07/08/2020            Lab Results   Component Value Date     HDL 45 (L) 01/24/2023     HDL 37 (L) 08/11/2022     HDL 44 (L) 07/08/2020            Lab Results   Component Value Date     TRIG 166 (H) 09/16/2023     TRIG 160 (H) 01/24/2023     TRIG 108 08/11/2022            Lab Results   Component Value Date     NONHDLC 146 07/12/2018     NONHDLC 207 (H) 04/29/2013      Continue outpatient diet/ lifestyle modification present x 4 quadrants

## 2023-09-30 NOTE — ASSESSMENT & PLAN NOTE
Lab Results   Component Value Date    EGFR 59 09/30/2023    EGFR 79 09/29/2023    EGFR 77 09/28/2023    CREATININE 0.97 09/30/2023    CREATININE 0.77 09/29/2023    CREATININE 0.78 09/28/2023     Stable.

## 2023-09-30 NOTE — ASSESSMENT & PLAN NOTE
Lab Results   Component Value Date    EGFR 59 09/30/2023    EGFR 79 09/29/2023    EGFR 77 09/28/2023    CREATININE 0.97 09/30/2023    CREATININE 0.77 09/29/2023    CREATININE 0.78 09/28/2023   Stable at baseline,   Avoid nephrotoxic medications and fluctuations in blood pressure

## 2023-09-30 NOTE — ASSESSMENT & PLAN NOTE
• POA with acute respiratory failure, SOB. • On physical in [place] ED: Swollen tongue, swelling soft and hard palate and almost the head of all phalanx is completely closed. Severe angioedema. • Decadron 10 mg, Benadryl 25 mg given. • Initially refused but eventually agreed with intubation. • Prior episode of angioedema in 2020 noted. Suspected coadministration of increase the dose of tramadol and lisinopril. • Per daughter, there is no recent change in medications except Trelegy inhaler, cefdinir.   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          Plan:  • Trach placed 9/17  • Tolerating trach, No issue  • PEG tube in place

## 2023-09-30 NOTE — ASSESSMENT & PLAN NOTE
· 9/14 Neck/ soft tissue CT: Large enhancing soft tissue mass identified within the left neck involving multiple spaces. This appears to be centered within the left oropharynx with extensions as described above into multiple spaces. This results in significant airway compromise. 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 Small level 3 and level 4 nodes are seen within the neck. · Tracheostomy by ENT, bx & addition testing performed  · Bx: Positive for malignancy, favor poorly differentiated carcinoma. Cannot entirely exclude a high grade large cell lymphoma. Portion of specimen submitted for flow cytometry. · H/o tobacco abuse, daily smoker. · Daughter was updated at bedside. · Preliminary biopsy: Large B-cell lymphoma, germinal center B-cell type, c-MYC and BCL-2 double expression. · R-EPOCH chemotherapy From 9/22/23 to 9/27/23.     Plan:  · ENT/ Med onc following, appreciated  · Follow final biopsy report  · Off vent on trach

## 2023-09-30 NOTE — RESPIRATORY THERAPY NOTE
09/30/23 0900   Respiratory Assessment   Resp Comments Called for patient satting in the 76s. Upon arrival patient was 77%. Changed inner cannula. Patient refused suctioning. Maxed HFNC to 60L/100%, Patient satting 86%. Patient instructed on the importance on suctioning out secretions and patients saturation status.    Non-Invasive Information   O2 Interface Device HFNC prongs   Non-Invasive Ventilation Mode HFNC (High flow)   SpO2 (!) (S)  86 %   $ Pulse Oximetry Spot Check Charge Completed   Non-Invasive Settings   FiO2 (%) 100   Flow (lpm) 60   Temperature (Set) 31   Non-Invasive Readings   Skin Intervention Skin intact   Heater Temperature (Obs) 31

## 2023-09-30 NOTE — CONSULTS
[address] [facility]  Consult  Name: Wilma Bacon 69 y.o. female I MRN: [id number]  Unit/Bed#: ICU 03 I Date of Admission: 9/13/2023   Date of Service: 9/30/2023 I Hospital Day: 17    Consults    Assessment/Plan   Large cell lymphoma ([address])  Assessment & Plan  · Biopsy on 9/17: Large B-cell lymphoma, germinal center B-cell type, c-MYC and BCL-2 double expression. Ki-67 proliferation index is up to 90%; FISH & NGS pending. · Staging work-up: Currently stage II. · First inpatient chemotherapy 9/22/2023- with R-EPOCH. Plan:  · Inpatient hematology oncology following. Recommendation appreciated. · Outpatient follow-up with hematology oncology. · Close TLS workup  · PEG tube per ENT    Oropharyngeal mass  Assessment & Plan  · 9/14 Neck/ soft tissue CT: Large enhancing soft tissue mass identified within the left neck involving multiple spaces. This appears to be centered within the left oropharynx with extensions as described above into multiple spaces. This results in significant airway compromise. 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 Small level 3 and level 4 nodes are seen within the neck. · Tracheostomy by ENT, bx & addition testing performed  · Bx: Positive for malignancy, favor poorly differentiated carcinoma. Cannot entirely exclude a high grade large cell lymphoma. Portion of specimen submitted for flow cytometry. · H/o tobacco abuse, daily smoker. · Daughter was updated at bedside. · Preliminary biopsy: Large B-cell lymphoma, germinal center B-cell type, c-MYC and BCL-2 double expression. · R-EPOCH chemotherapy From 9/22/23 to 9/27/23. Plan:  · ENT/ Med onc following, appreciated  · Follow final biopsy report  · Off vent on trach      * Acute respiratory failure with hypoxia secondary to oropharyngeal mass  Assessment & Plan  • POA with O2 saturate around 80% on room air. Needed high flow 60 L to bring up her O2 saturation to 95%.   • Recent hospitalization for respiratory failure second to pneumonia. • CTA PE study negative for PE. New onset RML PNA. • Persistent partial atelectasis of the lower lobes noted. • Current daily smoker.       Plan:  • Tracheostomy 9/17. In place . • Completed Decadron. • Atrovent/Xopenex  • Airway clearance protocol. IS.  • Continue 3% saline nebs     (HFpEF) heart failure with preserved ejection fraction [facility])  Assessment & Plan  Wt Readings from Last 3 Encounters:   09/30/23 76 kg (167 lb 8.8 oz)   09/07/23 74.6 kg (164 lb 6.4 oz)   08/30/23 73.3 kg (161 lb 9.6 oz)     Lab Results   Component Value Date    LVEF 60 08/28/2023     (H) 09/29/2023     (H) 09/13/2023     • Volume status: hypervolemic. Got 40 lasix  • POA physical (limited): JVD-, HJR-, B/L LE trace to 1+ edema. • Echo 8/28/23: LVEF 60%. D1DD. • CXR 9/22: Persistent pulmonary venous congestion with small effusions and bibasilar atelectasis. IV lasix given. Will monitor I/O       Plan:  • CMP, magnesium; Goal Mg > 2 and K > 4; Replete prn  • HOB > 30°, Daily standing weights, Measure I/O        Angioedema  Assessment & Plan  • POA with acute respiratory failure, SOB. • On physical in [place] ED: Swollen tongue, swelling soft and hard palate and almost the head of all phalanx is completely closed. Severe angioedema. • Decadron 10 mg, Benadryl 25 mg given. • Initially refused but eventually agreed with intubation. • Prior episode of angioedema in 2020 noted. Suspected coadministration of increase the dose of tramadol and lisinopril. • Per daughter, there is no recent change in medications except Trelegy inhaler, cefdinir. 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 Plan:  • Trach placed 9/17  • Tolerating trach, No issue    Ambulatory dysfunction  Assessment & Plan  PT/ OT eval before discharge. Pain syndrome, chronic  Assessment & Plan  · Home regimen: Oxycodone 5 mg twice daily as needed. Tylenol 650 mg every 8 hours as needed. Gabapentin 600 mg 3 times daily. Medical marijuana. · Per daughter, patient was overusing Tylenol over-the-counter. · Pain mostly from lumbar radiculopathy. · Impaired ambulatory dysfunction second to pain. Plan:  · Continue gabapentin 300 mg 3 times daily, scheduled oxycodone 5 mg 3 times daily, as needed Tylenol 650 mg every 6 hours. As needed fentanyl 50 mcg every 2 hours for severe pain. Hyperlipidemia  Assessment & Plan  Lab Results   Component Value Date    CHOLESTEROL 203 (H) 01/24/2023    CHOLESTEROL 177 08/11/2022    CHOLESTEROL 184 07/08/2020     Lab Results   Component Value Date    HDL 45 (L) 01/24/2023    HDL 37 (L) 08/11/2022    HDL 44 (L) 07/08/2020     Lab Results   Component Value Date    TRIG 166 (H) 09/16/2023    TRIG 160 (H) 01/24/2023    TRIG 108 08/11/2022     Lab Results   Component Value Date    NONHDLC 146 07/12/2018    [address] 207 (H) 04/29/2013     Continue outpatient diet/ lifestyle modification. Benign essential hypertension  Assessment & Plan  · Well controlled at home. · Home meds: Amlodipine 10 mg daily, Coreg 25 mg twice daily, lisinopril 10 mg daily. · Elevated BP: at goal ( 114/60 ) after lasix    Plan:  · Coreg 25 mg BID. · Amlodipine 10 mg daily. Lisinopril 30 mg daily. · PRN hydralazine if BP >160. Generalized abdominal pain  Assessment & Plan  . Patient reports abdominal pain is mainly in the epigastric area. Intermittently with coughing. . Recommend starting Bentyl PRN    Chronic Superficial thrombophlebitis  Assessment & Plan  9/23: Patient C/o right forearm soreness. Bilateral upper extremity ultrasound was performed in setting of high risk of DVT. RIGHT UPPER LIMB U/S: No evidence of acute or chronic deep vein thrombosis. 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 Doppler evaluation shows a normal response to augmentation maneuvers.     Blister (nonthermal) of left forearm, sequela  Assessment & Plan  · 9/17/23: Blisters of LUE noted, no signs of infection  · Daily wound care, monitor signs of infection     CKD (chronic kidney disease) stage 3, GFR 30-59 ml/min [facility])  Assessment & Plan  Lab Results   Component Value Date    EGFR 59 2023    EGFR 79 2023    EGFR 77 2023    CREATININE 0.97 2023    CREATININE 0.77 2023    CREATININE 0.78 2023     Stable. History of Present Illness     HPI: Ariana Duffy is a 69 y.o. who presents with Patient was in acute distress, was saturating as low as 69. Patient is refusing to receive any treatment including suctioning. Patient has tenderness in the abdomen and reports only comes about with coughing    History obtained from the patient. Review of Systems   Constitutional: Negative for chills and fever. HENT: Negative for ear pain and sore throat. Eyes: Negative for pain and visual disturbance. Respiratory: Negative for cough and shortness of breath. Cardiovascular: Negative for chest pain and palpitations. Gastrointestinal: Positive for abdominal pain (intermittent). Negative for vomiting. Genitourinary: Negative for dysuria and hematuria. Musculoskeletal: Positive for back pain (chronic). Negative for arthralgias. Neurological: Negative for seizures and syncope. All other systems reviewed and are negative. Historical Information   Past Medical History:  No date:  Anxiety  No date: Depression  No date: Fatty liver  No date: Hyperlipidemia  No date: Hypertension  No date: Psychiatric disorder  No date: Varicella Past Surgical History:  No date: BREAST SURGERY; Bilateral      Comment:  Reduction Procedure  No date: CARDIAC SURGERY  No date:  SECTION      Comment:  x4  No date: DILATION AND CURETTAGE OF UTERUS  No date: ECTOPIC PREGNANCY SURGERY  2023: IR GASTROSTOMY TUBE PLACEMENT  2023:  [address] INCL FLUOR GDNCE DX W/CELL WASHG SPX; N/A      Comment:  Procedure: BRONCHOSCOPY FLEXIBLE;  Surgeon: Mali Bella Homer Rey MD;  Location: [facility];  Service: ENT  9/17/2023: TRACHEOSTOMY; N/A      Comment:  Procedure: TRACHEOSTOMY, biopsy of nasopharynx mass;                 Surgeon: Evens Smiley MD;  Location: [facility];                 Service: ENT   Current Outpatient Medications   Medication Instructions   • acetaminophen (TYLENOL) 650 mg, Oral, Every 8 hours PRN   • albuterol (PROVENTIL HFA,VENTOLIN HFA) 90 mcg/act inhaler 2 puffs, Inhalation, Every 6 hours PRN   • amLODIPine (NORVASC) 10 mg tablet TAKE 1 TABLET BY MOUTH EVERY DAY   • ASPIRIN-ACETAMINOPHEN-CAFFEINE PO Oral   • busPIRone (BUSPAR) 30 mg, Oral, 3 times daily   • Calcium Carb-Cholecalciferol (OSCAL-D) 500 mg-200 units per tablet 1 tablet, Oral, 2 times daily with meals   • carvedilol (COREG) 25 mg tablet TAKE 1 TABLET BY MOUTH TWICE A DAY WITH FOOD   • diclofenac sodium (VOLTAREN) 1 % APPLY 2 GRAMS TO AFFECTED AREA 4 TIMES A DAY   • EPINEPHrine (EPIPEN) 0.3 mg, Intramuscular, Once   • famotidine (PEPCID) 20 mg, Oral, 2 times daily   • fluticasone (FLONASE) 50 mcg/act nasal spray SPRAY 2 SPRAYS INTO EACH NOSTRIL EVERY DAY   • fluticasone-umeclidinium-vilanterol (Trelegy Ellipta) 100-62.5-25 mcg/actuation inhaler 1 puff, Inhalation, Daily, Rinse mouth after use. • gabapentin (NEURONTIN) 600 mg, Oral, 3 times daily   • loratadine (CLARITIN) 10 mg, Oral, Daily   • miconazole (MONISTAT-7) 2 % vaginal cream 1 applicator, Vaginal, Daily at bedtime   • naloxone (NARCAN) 4 mg/0.1 mL nasal spray Administer 1 spray into a nostril. If no response after 2-3 minutes, give another dose in the other nostril using a new spray.    • nicotine (NICODERM CQ) 7 mg/24hr TD 24 hr patch 1 patch, Transdermal, Every 24 hours   • NON FORMULARY Hempvana roll on cream for pain    • oxyCODONE (OXY-IR) 5 mg, Oral, 2 times daily PRN   • sertraline (ZOLOFT) 50 mg tablet TAKE 1 TABLET BY MOUTH EVERY DAY    Allergies   Allergen Reactions   • Methyldopa Shortness Of Breath and Other (See Comments)     Aldomet      Social History     Tobacco Use   • Smoking status: Every Day     Packs/day: 1.00     Types: Cigarettes   • Smokeless tobacco: Never   • Tobacco comments:     2 Black and milds per day   Vaping Use   • Vaping Use: Never used   Substance Use Topics   • Alcohol use: Not Currently   • Drug use: Yes     Types: Marijuana     Comment: for pain    Family History   Problem Relation Age of Onset   • Lung cancer Mother    • Cirrhosis Father    • Hypertension Sister    • Bipolar disorder Sister    • Heart disease Sister    • Diabetes Family    • Heart disease Family    • Hypertension Family    • Stroke Family    • Thyroid disease Family         Objective                            Vitals I/O      Most Recent Min/Max in 24hrs   Temp 98.9 °F (37.2 °C) Temp  Min: 98.5 °F (36.9 °C)  Max: 99.3 °F (37.4 °C)   Pulse 90 Pulse  Min: 84  Max: 98   Resp 16 Resp  Min: 13  Max: 21   /69 BP  Min: 81/52  Max: 114/60   O2 Sat 99 % SpO2  Min: 86 %  Max: 100 %      Intake/Output Summary (Last 24 hours) at 9/30/2023 1721  Last data filed at 9/30/2023 1126  Gross per 24 hour   Intake 306 ml   Output 350 ml   Net -44 ml         Diet Enteral/Parenteral; Tube Feeding No Oral Diet; Jevity 1.5; Continuous; 20; Every 4 hours     Invasive Monitoring Physical exam    Physical Exam  Eyes:      General: Vision grossly intact. Extraocular Movements: Extraocular movements intact. Conjunctiva/sclera: Conjunctivae normal.      Pupils: Pupils are equal, round, and reactive to light. Skin:     General: Skin is cool. Findings: Rash (Blister of left forearm) present. HENT:      Head: Normocephalic and atraumatic. Right Ear: Hearing and tympanic membrane normal.      Left Ear: Tympanic membrane normal.      Nose: No congestion or rhinorrhea. Mouth/Throat:      Mouth: Mucous membranes are moist.   Neck:      Vascular: No JVD. Trachea: Tracheostomy present.    No midline tenderness Cardiovascular: Rate and Rhythm: Normal rate and regular rhythm. Pulses: Normal pulses. Heart sounds: Normal heart sounds. Musculoskeletal:         General: No swelling, tenderness, deformity or signs of injury. Normal range of motion. Right lower leg: No edema. Left lower leg: No edema. Abdominal:      General: Bowel sounds are normal.      Palpations: Abdomen is soft. Tenderness: There is abdominal tenderness. Comments: Epigastrium tenderness to palpation   Constitutional:       Appearance: She is well-developed and well-nourished. Comments: Patient laying comfortable in bed   Pulmonary:      Effort: Pulmonary effort is normal.      Breath sounds: Normal breath sounds. Neurological:      General: No focal deficit present. Mental Status: She is alert and oriented to person, place and time. Cough reflex and gag reflex intact. Vitals and nursing note reviewed.             Diagnostic Studies      EKG:   Imaging: Successful percutaneous fluoroscopic and ultrasound-guided placement of a 16 [place] gastrostomy tube       Medications:  Scheduled PRN   acyclovir, 400 mg, BID  allopurinol, 300 mg, Daily  amLODIPine, 10 mg, Daily  busPIRone, 30 mg, TID  carvedilol, 12.5 mg, BID With Meals  chlorhexidine, 15 mL, Q12H ASHLEY  enoxaparin, 40 mg, Q24H ASHLEY  famotidine, 20 mg, BID  Filgrastim-aafi, 300 mcg, Daily  fluconazole, 400 mg, Daily  gabapentin, 300 mg, TID  guaiFENesin, 200 mg, Q6H  levalbuterol, 1.25 mg, Q6H   And  ipratropium, 0.5 mg, Q6H  levofloxacin, 500 mg, Q24H ASHLEY  lisinopril, 40 mg, Daily  nicotine, 21 mg, Daily  nystatin, 500,000 Units, 4x Daily  oxyCODONE, 5 mg, Q8H  senna, 8.8 mg, HS  sertraline, 50 mg, Daily  sodium chloride, 4 mL, Q6H  sulfamethoxazole-trimethoprim, 1 tablet, Once per day on Mon Wed Fri      acetaminophen, 650 mg, Q6H PRN  alteplase, 2 mg, Q1MIN PRN  hydrALAZINE, 10 mg, Q4H PRN  HYDROmorphone, 0.2 mg, Q6H PRN  levalbuterol, 1.25 mg, Q8H PRN  lidocaine 1% buffered, , PRN  ondansetron, 4 mg, Q8H PRN  oxyCODONE, 2.5 mg, Q4H PRN   Or  oxyCODONE, 5 mg, Q4H PRN  polyethylene glycol, 17 g, Daily PRN       Continuous          Labs:    CBC    Recent Labs     09/29/23  1051 09/30/23  0439   WBC 6.89 1.18*   HGB 10.3* 9.4*   HCT 32.4* 28.6*   * 100*     BMP    Recent Labs     09/29/23  1051 09/30/23  0439   SODIUM 134* 134*   K 3.6 3.5   CL 96 95*   CO2 32 33*   AGAP 6 6   BUN 23 29*   CREATININE 0.77 0.97   CALCIUM 8.7 8.3*       Coags    No recent results     Additional Electrolytes  No recent results       Blood Gas    No recent results  Recent Labs     09/30/23  1432   PHVEN 7.406*   EWA2FIH 44.1   PO2VEN 38.0   ROX5NWU 27.1   BEVEN 2.1    LFTs  Recent Labs     09/29/23  1051 09/30/23  0439   ALT 17 16   AST 11* 11*   ALKPHOS 46 50   ALB 2.4* 2.5*   TBILI 0.41 0.43       Infectious  No recent results  Glucose  Recent Labs     09/29/23  1051 09/30/23  0439   GLUC 126 140             Critical Care Time Delivered: Upon my evaluation, this patient had a high probability of imminent or life-threatening deterioration due to Acute Respiratory Failiure, which required my direct attention, intervention, and personal management. I have personally provided 30 minutes of critical care time, exclusive of procedures, teaching, family meetings, and any prior time recorded by providers other than myself.    Daysi Hernandes MD

## 2023-09-30 NOTE — ASSESSMENT & PLAN NOTE
• POA with O2 saturate around 80% on room air.  Needed high flow 60 L to bring up her O2 saturation to 95%. • Recent hospitalization for respiratory failure second to pneumonia. • CTA PE study negative for PE.  New onset RML PNA. • Persistent partial atelectasis of the lower lobes noted. • Current daily smoker. • Patient had an event of desaturation last night with her oxygen level falling in high 70s and low 80s, patient was assessed by the night team and was given Lasix which led to resolution of her symptoms. • Pt saturation is in the 90s on 90 L of oxygen  • PEG tube in place   • Patient had a desaturation episode at night, and in the morning which she desaturated as low as 69%. Patient oxygen was brought up by suctioning. However patient has been refusing regular suctioning of the tracheal.        Plan:  • Stat CT abdomen, chest and pelvis   • tracheostomy 9/17. Tracheal cuff in place  • Completed Decadron. • Atrovent/Xopenex  • Airway clearance protocol.  IS.  • Continue 3% saline nebs

## 2023-09-30 NOTE — ASSESSMENT & PLAN NOTE
• 9/14 Neck/ soft tissue CT: Large enhancing soft tissue mass identified within the left neck involving multiple spaces. This appears to be centered within the left oropharynx with extensions as described above into multiple spaces. This results in significant airway compromise. 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 Small level 3 and level 4 nodes are seen within the neck. • Tracheostomy by ENT, bx & addition testing performed  • Bx: Positive for malignancy, favor poorly differentiated carcinoma. Cannot entirely exclude a high grade large cell lymphoma. Portion of specimen submitted for flow cytometry. • H/o tobacco abuse, daily smoker. • Daughter was updated at bedside. • Preliminary biopsy: Large B-cell lymphoma, germinal center B-cell type, c-MYC and BCL-2 double expression. • First chemotherapy on 9/22.   • WBC- 1.18      Plan:  Neutropenic precautions  • ENT/ Med onc following, appreciated  • Follow final biopsy report  • Tracheal cuff in place, PEG tube in place  • Patient will follow oncology outpatient

## 2023-09-30 NOTE — ASSESSMENT & PLAN NOTE
· Well controlled at home. · Home meds: Amlodipine 10 mg daily, Coreg 25 mg twice daily, lisinopril 10 mg daily. · Elevated BP: at goal ( 114/60 ) after lasix    Plan:  · Coreg 25 mg BID. · Amlodipine 10 mg daily. Lisinopril 30 mg daily. · PRN hydralazine if BP >160.

## 2023-09-30 NOTE — ASSESSMENT & PLAN NOTE
Wt Readings from Last 3 Encounters:   09/30/23 76 kg (167 lb 8.8 oz)   09/07/23 74.6 kg (164 lb 6.4 oz)   08/30/23 73.3 kg (161 lb 9.6 oz)          • Volume status: Euvolemic. • POA physical (limited): JVD-, HJR-, B/L LE trace to 1+ edema. • Echo 8/28/23: LVEF 60%. D1DD. • CXR 9/22: Persistent pulmonary venous congestion with small effusions and bibasilar atelectasis. IV lasix given with even I/O.   • Patient had an event of desaturation last night with her oxygen level falling in high 70s and low 80s, patient was assessed by the night team and was given Lasix which led to resolution of her symptoms  I/Os = +636      Plan:  • CMP, magnesium; Goal Mg > 2 and K > 4; Replete prn  • HOB > 30°, Daily standing weights, Measure I/O  • Will continue holding diuresis

## 2023-09-30 NOTE — ASSESSMENT & PLAN NOTE
Wt Readings from Last 3 Encounters:   09/30/23 76 kg (167 lb 8.8 oz)   09/07/23 74.6 kg (164 lb 6.4 oz)   08/30/23 73.3 kg (161 lb 9.6 oz)     Lab Results   Component Value Date    LVEF 60 08/28/2023     (H) 09/29/2023     (H) 09/13/2023     • Volume status: hypervolemic. Got 40 lasix  • POA physical (limited): JVD-, HJR-, B/L LE trace to 1+ edema. • Echo 8/28/23: LVEF 60%. D1DD. • CXR 9/22: Persistent pulmonary venous congestion with small effusions and bibasilar atelectasis. IV lasix given.  Will monitor I/O       Plan:  • CMP, magnesium; Goal Mg > 2 and K > 4; Replete prn  • HOB > 30°, Daily standing weights, Measure I/O

## 2023-09-30 NOTE — ASSESSMENT & PLAN NOTE
• POA with acute respiratory failure, SOB. • On physical in [place] ED: Swollen tongue, swelling soft and hard palate and almost the head of all phalanx is completely closed. Severe angioedema. • Decadron 10 mg, Benadryl 25 mg given. • Initially refused but eventually agreed with intubation. • Prior episode of angioedema in 2020 noted. Suspected coadministration of increase the dose of tramadol and lisinopril. • Per daughter, there is no recent change in medications except Trelegy inhaler, cefdinir. 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       Plan:  • Trach placed 9/17  • Tolerating trach, No issue

## 2023-09-30 NOTE — ASSESSMENT & PLAN NOTE
• Home regimen: Oxycodone 5 mg twice daily as needed. Tylenol 650 mg every 8 hours as needed. Gabapentin 600 mg 3 times daily. Medical marijuana. • Per daughter, patient was overusing Tylenol over-the-counter. • Pain mostly from lumbar radiculopathy. • Impaired ambulatory dysfunction second to pain.     Plan:  dilauded 0.2 iv  • Continue gabapentin 300 3 times daily, scheduled oxycodone 5 mg 3 times daily, as needed Tylenol 650 mg every 6 hours.   As needed fentanyl 50 mcg every 2 hours for severe pain

## 2023-09-30 NOTE — ASSESSMENT & PLAN NOTE
• Biopsy on 9/17: Large B-cell lymphoma, germinal center B-cell type, c-MYC and BCL-2 double expression. Ki-67 proliferation index is up to 90%; FISH & NGS pending. • Staging work-up: Currently stage II. • First inpatient chemotherapy 9/22/2023- with R-EPOCH. • Patient started on filgrastim  •  WBC levels are at 1.18 today     Plan:  Neutropenic precautions  • Inpatient hematology oncology following. Recommendation appreciated. • Outpatient follow-up with hematology oncology.   • Close TLS workup- uric acid, electrolytes  • Allopurinol as prophylaxis  • Neupogen  • PEG tube in place

## 2023-09-30 NOTE — ASSESSMENT & PLAN NOTE
. Patient reports abdominal pain is mainly in the epigastric area. Intermittently with coughing.   . Recommend starting Bentyl PRN

## 2023-10-01 ENCOUNTER — APPOINTMENT (INPATIENT)
Dept: RADIOLOGY | Facility: HOSPITAL | Age: 68
DRG: 004 | End: 2023-10-01
Payer: COMMERCIAL

## 2023-10-01 PROBLEM — N89.8 VAGINAL ITCHING: Status: RESOLVED | Noted: 2023-08-30 | Resolved: 2023-10-01

## 2023-10-01 LAB
ANION GAP SERPL CALCULATED.3IONS-SCNC: 5 MMOL/L
ANISOCYTOSIS BLD QL SMEAR: PRESENT
BUN SERPL-MCNC: 27 MG/DL (ref 5–25)
CALCIUM SERPL-MCNC: 8.3 MG/DL (ref 8.4–10.2)
CHLORIDE SERPL-SCNC: 97 MMOL/L (ref 96–108)
CO2 SERPL-SCNC: 33 MMOL/L (ref 21–32)
CREAT SERPL-MCNC: 0.97 MG/DL (ref 0.6–1.3)
ERYTHROCYTE [DISTWIDTH] IN BLOOD BY AUTOMATED COUNT: 15.3 % (ref 11.6–15.1)
GFR SERPL CREATININE-BSD FRML MDRD: 59 ML/MIN/1.73SQ M
GLUCOSE SERPL-MCNC: 168 MG/DL (ref 65–140)
HCT VFR BLD AUTO: 27.1 % (ref 34.8–46.1)
HGB BLD-MCNC: 8.6 G/DL (ref 11.5–15.4)
MAGNESIUM SERPL-MCNC: 2 MG/DL (ref 1.9–2.7)
MCH RBC QN AUTO: 30.8 PG (ref 26.8–34.3)
MCHC RBC AUTO-ENTMCNC: 31.7 G/DL (ref 31.4–37.4)
MCV RBC AUTO: 97 FL (ref 82–98)
PHOSPHATE SERPL-MCNC: 2.5 MG/DL (ref 2.3–4.1)
PLATELET # BLD AUTO: 74 THOUSANDS/UL (ref 149–390)
PLATELET BLD QL SMEAR: ABNORMAL
PMV BLD AUTO: 9.7 FL (ref 8.9–12.7)
POIKILOCYTOSIS BLD QL SMEAR: PRESENT
POTASSIUM SERPL-SCNC: 3.7 MMOL/L (ref 3.5–5.3)
PROCALCITONIN SERPL-MCNC: 0.53 NG/ML
RBC # BLD AUTO: 2.79 MILLION/UL (ref 3.81–5.12)
RBC MORPH BLD: PRESENT
SODIUM SERPL-SCNC: 135 MMOL/L (ref 135–147)
URATE SERPL-MCNC: 2.3 MG/DL (ref 2–7.5)
WBC # BLD AUTO: 0.2 THOUSAND/UL (ref 4.31–10.16)

## 2023-10-01 PROCEDURE — 94002 VENT MGMT INPAT INIT DAY: CPT

## 2023-10-01 PROCEDURE — 99291 CRITICAL CARE FIRST HOUR: CPT | Performed by: INTERNAL MEDICINE

## 2023-10-01 PROCEDURE — 83735 ASSAY OF MAGNESIUM: CPT | Performed by: NURSE PRACTITIONER

## 2023-10-01 PROCEDURE — 87040 BLOOD CULTURE FOR BACTERIA: CPT | Performed by: NURSE PRACTITIONER

## 2023-10-01 PROCEDURE — 94640 AIRWAY INHALATION TREATMENT: CPT

## 2023-10-01 PROCEDURE — 94669 MECHANICAL CHEST WALL OSCILL: CPT

## 2023-10-01 PROCEDURE — 84100 ASSAY OF PHOSPHORUS: CPT | Performed by: NURSE PRACTITIONER

## 2023-10-01 PROCEDURE — 85027 COMPLETE CBC AUTOMATED: CPT | Performed by: NURSE PRACTITIONER

## 2023-10-01 PROCEDURE — 80048 BASIC METABOLIC PNL TOTAL CA: CPT | Performed by: NURSE PRACTITIONER

## 2023-10-01 PROCEDURE — 94760 N-INVAS EAR/PLS OXIMETRY 1: CPT

## 2023-10-01 PROCEDURE — 87070 CULTURE OTHR SPECIMN AEROBIC: CPT | Performed by: NURSE PRACTITIONER

## 2023-10-01 PROCEDURE — 71045 X-RAY EXAM CHEST 1 VIEW: CPT

## 2023-10-01 PROCEDURE — 84550 ASSAY OF BLOOD/URIC ACID: CPT | Performed by: NURSE PRACTITIONER

## 2023-10-01 PROCEDURE — 84145 PROCALCITONIN (PCT): CPT | Performed by: NURSE PRACTITIONER

## 2023-10-01 PROCEDURE — 94003 VENT MGMT INPAT SUBQ DAY: CPT

## 2023-10-01 PROCEDURE — 87205 SMEAR GRAM STAIN: CPT | Performed by: NURSE PRACTITIONER

## 2023-10-01 PROCEDURE — 94668 MNPJ CHEST WALL SBSQ: CPT

## 2023-10-01 RX ORDER — FUROSEMIDE 10 MG/ML
40 INJECTION INTRAMUSCULAR; INTRAVENOUS ONCE
Status: COMPLETED | OUTPATIENT
Start: 2023-10-01 | End: 2023-10-01

## 2023-10-01 RX ORDER — POTASSIUM CHLORIDE 20 MEQ/1
20 TABLET, EXTENDED RELEASE ORAL ONCE
Status: DISCONTINUED | OUTPATIENT
Start: 2023-10-01 | End: 2023-10-01

## 2023-10-01 RX ORDER — QUETIAPINE FUMARATE 25 MG/1
50 TABLET, FILM COATED ORAL
Status: DISCONTINUED | OUTPATIENT
Start: 2023-10-01 | End: 2023-12-04 | Stop reason: HOSPADM

## 2023-10-01 RX ORDER — POTASSIUM CHLORIDE 20MEQ/15ML
20 LIQUID (ML) ORAL ONCE
Status: COMPLETED | OUTPATIENT
Start: 2023-10-01 | End: 2023-10-01

## 2023-10-01 RX ADMIN — BUSPIRONE HYDROCHLORIDE 30 MG: 10 TABLET ORAL at 16:13

## 2023-10-01 RX ADMIN — OXYCODONE HYDROCHLORIDE 5 MG: 5 SOLUTION ORAL at 22:24

## 2023-10-01 RX ADMIN — GUAIFENESIN 200 MG: 200 SOLUTION ORAL at 14:15

## 2023-10-01 RX ADMIN — IPRATROPIUM BROMIDE 0.5 MG: 0.5 SOLUTION RESPIRATORY (INHALATION) at 20:24

## 2023-10-01 RX ADMIN — OXYCODONE HYDROCHLORIDE 5 MG: 5 SOLUTION ORAL at 05:38

## 2023-10-01 RX ADMIN — BUSPIRONE HYDROCHLORIDE 30 MG: 10 TABLET ORAL at 08:03

## 2023-10-01 RX ADMIN — ACYCLOVIR 400 MG: 200 CAPSULE ORAL at 08:04

## 2023-10-01 RX ADMIN — NYSTATIN 500000 UNITS: 100000 SUSPENSION ORAL at 08:03

## 2023-10-01 RX ADMIN — SERTRALINE HYDROCHLORIDE 50 MG: 50 TABLET ORAL at 08:03

## 2023-10-01 RX ADMIN — GABAPENTIN 300 MG: 300 CAPSULE ORAL at 22:25

## 2023-10-01 RX ADMIN — NICOTINE 21 MG: 21 PATCH, EXTENDED RELEASE TRANSDERMAL at 08:05

## 2023-10-01 RX ADMIN — GABAPENTIN 300 MG: 300 CAPSULE ORAL at 16:13

## 2023-10-01 RX ADMIN — GUAIFENESIN 200 MG: 200 SOLUTION ORAL at 08:03

## 2023-10-01 RX ADMIN — LEVOFLOXACIN 500 MG: 250 TABLET, FILM COATED ORAL at 08:02

## 2023-10-01 RX ADMIN — GUAIFENESIN 200 MG: 200 SOLUTION ORAL at 22:25

## 2023-10-01 RX ADMIN — BUSPIRONE HYDROCHLORIDE 30 MG: 10 TABLET ORAL at 22:25

## 2023-10-01 RX ADMIN — NYSTATIN 500000 UNITS: 100000 SUSPENSION ORAL at 18:24

## 2023-10-01 RX ADMIN — CARVEDILOL 12.5 MG: 12.5 TABLET, FILM COATED ORAL at 16:13

## 2023-10-01 RX ADMIN — POTASSIUM CHLORIDE 20 MEQ: 1.5 SOLUTION ORAL at 08:02

## 2023-10-01 RX ADMIN — LEVALBUTEROL HYDROCHLORIDE 1.25 MG: 1.25 SOLUTION RESPIRATORY (INHALATION) at 20:24

## 2023-10-01 RX ADMIN — ACYCLOVIR 400 MG: 200 CAPSULE ORAL at 18:24

## 2023-10-01 RX ADMIN — FLUCONAZOLE 400 MG: 100 TABLET ORAL at 08:03

## 2023-10-01 RX ADMIN — ALLOPURINOL 300 MG: 100 TABLET ORAL at 08:02

## 2023-10-01 RX ADMIN — SODIUM CHLORIDE SOLN NEBU 3% 4 ML: 3 NEBU SOLN at 00:29

## 2023-10-01 RX ADMIN — GABAPENTIN 300 MG: 300 CAPSULE ORAL at 08:03

## 2023-10-01 RX ADMIN — NYSTATIN 500000 UNITS: 100000 SUSPENSION ORAL at 12:33

## 2023-10-01 RX ADMIN — FUROSEMIDE 40 MG: 10 INJECTION, SOLUTION INTRAMUSCULAR; INTRAVENOUS at 09:36

## 2023-10-01 RX ADMIN — FAMOTIDINE 20 MG: 20 TABLET, FILM COATED ORAL at 18:24

## 2023-10-01 RX ADMIN — FILGRASTIM-AAFI 300 MCG: 300 INJECTION, SOLUTION SUBCUTANEOUS at 09:15

## 2023-10-01 RX ADMIN — NYSTATIN 500000 UNITS: 100000 SUSPENSION ORAL at 22:25

## 2023-10-01 RX ADMIN — CARVEDILOL 12.5 MG: 12.5 TABLET, FILM COATED ORAL at 08:03

## 2023-10-01 RX ADMIN — GUAIFENESIN 200 MG: 200 SOLUTION ORAL at 03:41

## 2023-10-01 RX ADMIN — LEVALBUTEROL HYDROCHLORIDE 1.25 MG: 1.25 SOLUTION RESPIRATORY (INHALATION) at 00:28

## 2023-10-01 RX ADMIN — LEVALBUTEROL HYDROCHLORIDE 1.25 MG: 1.25 SOLUTION RESPIRATORY (INHALATION) at 08:46

## 2023-10-01 RX ADMIN — CHLORHEXIDINE GLUCONATE 15 ML: 1.2 SOLUTION ORAL at 08:03

## 2023-10-01 RX ADMIN — CHLORHEXIDINE GLUCONATE 15 ML: 1.2 SOLUTION ORAL at 22:25

## 2023-10-01 RX ADMIN — SODIUM CHLORIDE SOLN NEBU 3% 4 ML: 3 NEBU SOLN at 20:24

## 2023-10-01 RX ADMIN — FAMOTIDINE 20 MG: 20 TABLET, FILM COATED ORAL at 08:03

## 2023-10-01 RX ADMIN — ENOXAPARIN SODIUM 40 MG: 40 INJECTION SUBCUTANEOUS at 08:03

## 2023-10-01 RX ADMIN — QUETIAPINE FUMARATE 50 MG: 25 TABLET ORAL at 22:25

## 2023-10-01 RX ADMIN — SODIUM CHLORIDE SOLN NEBU 3% 4 ML: 3 NEBU SOLN at 08:48

## 2023-10-01 RX ADMIN — IPRATROPIUM BROMIDE 0.5 MG: 0.5 SOLUTION RESPIRATORY (INHALATION) at 08:48

## 2023-10-01 RX ADMIN — OXYCODONE HYDROCHLORIDE 2.5 MG: 5 SOLUTION ORAL at 04:23

## 2023-10-01 RX ADMIN — IPRATROPIUM BROMIDE 0.5 MG: 0.5 SOLUTION RESPIRATORY (INHALATION) at 00:29

## 2023-10-01 RX ADMIN — OXYCODONE HYDROCHLORIDE 5 MG: 5 SOLUTION ORAL at 14:15

## 2023-10-01 NOTE — SPEECH THERAPY NOTE
Patient continues to require high flow via trach collar. Will f/u as able/appropriate.     Mai Kitchen M.S., [facility]  Speech Language Pathologist   Available via [employer] #[id number]  [place] #[id number]

## 2023-10-01 NOTE — PROGRESS NOTES
Progress Note - Critical Care   [facility] SERVICE 69 y.o. female MRN: [id number]  Unit/Bed#: ICU 03 Encounter: [id number]    24 hour events / Chief Complaint: delerium overnight / continued to desaturate prompting going back on vent support. Denies shortness of breath. Exam:     Patient is sitting in chair, flat affect. Poor inspiratory effort with diminished breath sounds throughout. Abdomen is soft and nontender. PEG tube is intact. Vitals:    10/01/23 1100   BP: 116/69   Pulse: 92   Resp: 16   Temp: 98.4 °F (36.9 °C)   SpO2: (!) 87%       Labs: I have personally reviewed pertinent lab results.   Results from last 7 days   Lab Units 10/01/23  0359 09/30/23 0439 09/29/23  1051   WBC Thousand/uL 0.20* 1.18* 6.89   HEMOGLOBIN g/dL 8.6* 9.4* 10.3*   HEMATOCRIT % 27.1* 28.6* 32.4*   PLATELETS Thousands/uL 74* 100* 127*     Results from last 7 days   Lab Units 10/01/23  0408 09/30/23  2041 09/30/23  0439 09/29/23  1051 09/28/23  0604   POTASSIUM mmol/L 3.7 3.6 3.5 3.6 3.2*   CHLORIDE mmol/L 97 95* 95* 96 96   CO2 mmol/L 33* 34* 33* 32 33*   BUN mg/dL 27* 27* 29* 23 21   CREATININE mg/dL 0.97 1.02 0.97 0.77 0.78   CALCIUM mg/dL 8.3* 8.5 8.3* 8.7 9.1   ALK PHOS U/L  --   --  50 46 40   ALT U/L  --   --  16 17 23   AST U/L  --   --  11* 11* 14     Results from last 7 days   Lab Units 09/30/23 2041   LACTIC ACID mmol/L 1.0     Results from last 7 days   Lab Units 10/01/23  0408   PROCALCITONIN ng/ml 0.53*     Results from last 7 days   Lab Units 09/27/23  0628   INR  0.96     Assessment/Plan:    · RESP:  Acute hypoxic respiratory failure, status post tracheostomy on 9/17/2023  -  Settings: PSV 10/6/85% with VT ~ 500  -  Trial of trach collar  -  Rest on pressure support during hours of sleep as needed    · CV: HTN / HFpEF -EF 60%   - Vasopressors: none  - prn Lasix   - On coreg, norvasc    · ID: Prior episode of aspiration pneumonia, completed treatment  - Antibiotics: Levofloxacin / Bactrim / acyclovir / diflucan since 9/22  -Continue broad-spectrum coverage empirically at the direction of oncology  -Blood and sputum cultures were sent yesterday secondary to neutropenia and low-grade fevers. Mild procalcitonin elevation can also be seen with malignancy     · HEME:  B-cell lymphoma s/p 1 cycle R-EPOCH 9/22-9/27 / neutropenic - on filgrastim / thrombocytopenia / anemia  -Neutropenic precautions  - DVT prophylaxis: Lovenox    · RENAL/ELECTROLYTES: No active issues    · GI/Nutrition:  S/p PEG   -Tolerating tube feeds    · NEURO:  Depression / chronic pain with opioid dependence / delerium  - Sedation: none  - On gabapentin, oxycodone  - Cont Zoloft - increase dose  - Check EKG - If QT ok, add seroquel HS    · DISPO: Guarded prognosis.   Plan to reengage palliative care this week for ongoing goals of care discussion    Critical Care time excluding procedures and family updates, 32 minutes    Marten Councilman,

## 2023-10-02 PROBLEM — F32.A DEPRESSION: Status: ACTIVE | Noted: 2023-10-02

## 2023-10-02 LAB
ALBUMIN SERPL BCP-MCNC: 2.5 G/DL (ref 3.5–5)
ALP SERPL-CCNC: 145 U/L (ref 34–104)
ALT SERPL W P-5'-P-CCNC: 20 U/L (ref 7–52)
ANION GAP SERPL CALCULATED.3IONS-SCNC: 4 MMOL/L
AST SERPL W P-5'-P-CCNC: 18 U/L (ref 13–39)
BASOPHILS # BLD MANUAL: 0 THOUSAND/UL (ref 0–0.1)
BASOPHILS NFR MAR MANUAL: 0 % (ref 0–1)
BILIRUB SERPL-MCNC: 0.62 MG/DL (ref 0.2–1)
BUN SERPL-MCNC: 28 MG/DL (ref 5–25)
CA-I BLD-SCNC: 1.08 MMOL/L (ref 1.12–1.32)
CALCIUM ALBUM COR SERPL-MCNC: 9.7 MG/DL (ref 8.3–10.1)
CALCIUM SERPL-MCNC: 8.5 MG/DL (ref 8.4–10.2)
CHLORIDE SERPL-SCNC: 97 MMOL/L (ref 96–108)
CO2 SERPL-SCNC: 34 MMOL/L (ref 21–32)
CREAT SERPL-MCNC: 1.04 MG/DL (ref 0.6–1.3)
EOSINOPHIL # BLD MANUAL: 0 THOUSAND/UL (ref 0–0.4)
EOSINOPHIL NFR BLD MANUAL: 2 % (ref 0–6)
ERYTHROCYTE [DISTWIDTH] IN BLOOD BY AUTOMATED COUNT: 15.4 % (ref 11.6–15.1)
GFR SERPL CREATININE-BSD FRML MDRD: 54 ML/MIN/1.73SQ M
GLUCOSE SERPL-MCNC: 144 MG/DL (ref 65–140)
HCT VFR BLD AUTO: 26.8 % (ref 34.8–46.1)
HGB BLD-MCNC: 8.3 G/DL (ref 11.5–15.4)
LYMPHOCYTES # BLD AUTO: 0.18 THOUSAND/UL (ref 0.6–4.47)
LYMPHOCYTES # BLD AUTO: 83 % (ref 14–44)
MAGNESIUM SERPL-MCNC: 2.1 MG/DL (ref 1.9–2.7)
MCH RBC QN AUTO: 30.2 PG (ref 26.8–34.3)
MCHC RBC AUTO-ENTMCNC: 31 G/DL (ref 31.4–37.4)
MCV RBC AUTO: 98 FL (ref 82–98)
MONOCYTES # BLD AUTO: 0.03 THOUSAND/UL (ref 0–1.22)
MONOCYTES NFR BLD: 13 % (ref 4–12)
NEUTROPHILS # BLD MANUAL: 0 THOUSAND/UL (ref 1.85–7.62)
NEUTS SEG NFR BLD AUTO: 0 % (ref 43–75)
PHOSPHATE SERPL-MCNC: 2.8 MG/DL (ref 2.3–4.1)
PLATELET # BLD AUTO: 68 THOUSANDS/UL (ref 149–390)
PLATELET BLD QL SMEAR: ABNORMAL
PMV BLD AUTO: 10.1 FL (ref 8.9–12.7)
POTASSIUM SERPL-SCNC: 4.1 MMOL/L (ref 3.5–5.3)
PROT SERPL-MCNC: 5.3 G/DL (ref 6.4–8.4)
RBC # BLD AUTO: 2.75 MILLION/UL (ref 3.81–5.12)
RBC MORPH BLD: NORMAL
SODIUM SERPL-SCNC: 135 MMOL/L (ref 135–147)
VARIANT LYMPHS # BLD AUTO: 2 %
WBC # BLD AUTO: 0.21 THOUSAND/UL (ref 4.31–10.16)

## 2023-10-02 PROCEDURE — 99232 SBSQ HOSP IP/OBS MODERATE 35: CPT

## 2023-10-02 PROCEDURE — 97110 THERAPEUTIC EXERCISES: CPT

## 2023-10-02 PROCEDURE — 99291 CRITICAL CARE FIRST HOUR: CPT | Performed by: INTERNAL MEDICINE

## 2023-10-02 PROCEDURE — 97530 THERAPEUTIC ACTIVITIES: CPT

## 2023-10-02 PROCEDURE — 94668 MNPJ CHEST WALL SBSQ: CPT

## 2023-10-02 PROCEDURE — 94003 VENT MGMT INPAT SUBQ DAY: CPT

## 2023-10-02 PROCEDURE — 99232 SBSQ HOSP IP/OBS MODERATE 35: CPT | Performed by: NURSE PRACTITIONER

## 2023-10-02 PROCEDURE — 94760 N-INVAS EAR/PLS OXIMETRY 1: CPT

## 2023-10-02 PROCEDURE — 83735 ASSAY OF MAGNESIUM: CPT

## 2023-10-02 PROCEDURE — 82330 ASSAY OF CALCIUM: CPT

## 2023-10-02 PROCEDURE — 85027 COMPLETE CBC AUTOMATED: CPT

## 2023-10-02 PROCEDURE — 84100 ASSAY OF PHOSPHORUS: CPT

## 2023-10-02 PROCEDURE — 94669 MECHANICAL CHEST WALL OSCILL: CPT

## 2023-10-02 PROCEDURE — 80053 COMPREHEN METABOLIC PANEL: CPT

## 2023-10-02 PROCEDURE — 85007 BL SMEAR W/DIFF WBC COUNT: CPT

## 2023-10-02 PROCEDURE — 94640 AIRWAY INHALATION TREATMENT: CPT

## 2023-10-02 RX ORDER — BUMETANIDE 0.25 MG/ML
2 INJECTION INTRAMUSCULAR; INTRAVENOUS ONCE
Status: COMPLETED | OUTPATIENT
Start: 2023-10-02 | End: 2023-10-02

## 2023-10-02 RX ORDER — BUMETANIDE 0.25 MG/ML
2 INJECTION INTRAMUSCULAR; INTRAVENOUS ONCE
Status: COMPLETED | OUTPATIENT
Start: 2023-10-02 | End: 2023-10-03

## 2023-10-02 RX ORDER — SODIUM CHLORIDE FOR INHALATION 0.9 %
VIAL, NEBULIZER (ML) INHALATION
Status: DISPENSED
Start: 2023-10-02 | End: 2023-10-02

## 2023-10-02 RX ADMIN — LEVOFLOXACIN 500 MG: 250 TABLET, FILM COATED ORAL at 08:09

## 2023-10-02 RX ADMIN — BUMETANIDE 2 MG: 0.25 INJECTION INTRAMUSCULAR; INTRAVENOUS at 17:14

## 2023-10-02 RX ADMIN — NYSTATIN 500000 UNITS: 100000 SUSPENSION ORAL at 08:11

## 2023-10-02 RX ADMIN — LISINOPRIL 40 MG: 20 TABLET ORAL at 08:11

## 2023-10-02 RX ADMIN — SODIUM CHLORIDE SOLN NEBU 3% 4 ML: 3 NEBU SOLN at 07:19

## 2023-10-02 RX ADMIN — NYSTATIN 500000 UNITS: 100000 SUSPENSION ORAL at 21:35

## 2023-10-02 RX ADMIN — CARVEDILOL 12.5 MG: 12.5 TABLET, FILM COATED ORAL at 17:15

## 2023-10-02 RX ADMIN — BUSPIRONE HYDROCHLORIDE 30 MG: 10 TABLET ORAL at 17:51

## 2023-10-02 RX ADMIN — IPRATROPIUM BROMIDE 0.5 MG: 0.5 SOLUTION RESPIRATORY (INHALATION) at 19:23

## 2023-10-02 RX ADMIN — SODIUM CHLORIDE SOLN NEBU 3% 4 ML: 3 NEBU SOLN at 13:24

## 2023-10-02 RX ADMIN — SODIUM CHLORIDE SOLN NEBU 3% 4 ML: 3 NEBU SOLN at 19:23

## 2023-10-02 RX ADMIN — LEVALBUTEROL HYDROCHLORIDE 1.25 MG: 1.25 SOLUTION RESPIRATORY (INHALATION) at 04:04

## 2023-10-02 RX ADMIN — GABAPENTIN 300 MG: 300 CAPSULE ORAL at 08:10

## 2023-10-02 RX ADMIN — GUAIFENESIN 200 MG: 200 SOLUTION ORAL at 03:01

## 2023-10-02 RX ADMIN — CARVEDILOL 12.5 MG: 12.5 TABLET, FILM COATED ORAL at 08:10

## 2023-10-02 RX ADMIN — NICOTINE 21 MG: 21 PATCH, EXTENDED RELEASE TRANSDERMAL at 08:12

## 2023-10-02 RX ADMIN — SODIUM CHLORIDE SOLN NEBU 3% 4 ML: 3 NEBU SOLN at 04:04

## 2023-10-02 RX ADMIN — AMLODIPINE BESYLATE 10 MG: 5 TABLET ORAL at 08:10

## 2023-10-02 RX ADMIN — ALLOPURINOL 300 MG: 100 TABLET ORAL at 08:11

## 2023-10-02 RX ADMIN — NYSTATIN 500000 UNITS: 100000 SUSPENSION ORAL at 12:39

## 2023-10-02 RX ADMIN — SULFAMETHOXAZOLE AND TRIMETHOPRIM 1 TABLET: 800; 160 TABLET ORAL at 08:11

## 2023-10-02 RX ADMIN — OXYCODONE HYDROCHLORIDE 5 MG: 5 SOLUTION ORAL at 21:34

## 2023-10-02 RX ADMIN — IPRATROPIUM BROMIDE 0.5 MG: 0.5 SOLUTION RESPIRATORY (INHALATION) at 04:04

## 2023-10-02 RX ADMIN — OXYCODONE HYDROCHLORIDE 5 MG: 5 SOLUTION ORAL at 15:16

## 2023-10-02 RX ADMIN — FLUCONAZOLE 400 MG: 100 TABLET ORAL at 08:10

## 2023-10-02 RX ADMIN — FILGRASTIM-AAFI 300 MCG: 300 INJECTION, SOLUTION SUBCUTANEOUS at 12:38

## 2023-10-02 RX ADMIN — LEVALBUTEROL HYDROCHLORIDE 1.25 MG: 1.25 SOLUTION RESPIRATORY (INHALATION) at 07:19

## 2023-10-02 RX ADMIN — GUAIFENESIN 200 MG: 200 SOLUTION ORAL at 08:11

## 2023-10-02 RX ADMIN — FAMOTIDINE 20 MG: 20 TABLET, FILM COATED ORAL at 17:14

## 2023-10-02 RX ADMIN — Medication 8.8 MG: at 21:39

## 2023-10-02 RX ADMIN — GUAIFENESIN 200 MG: 200 SOLUTION ORAL at 20:27

## 2023-10-02 RX ADMIN — GABAPENTIN 300 MG: 300 CAPSULE ORAL at 15:17

## 2023-10-02 RX ADMIN — IPRATROPIUM BROMIDE 0.5 MG: 0.5 SOLUTION RESPIRATORY (INHALATION) at 13:24

## 2023-10-02 RX ADMIN — BUSPIRONE HYDROCHLORIDE 30 MG: 10 TABLET ORAL at 21:34

## 2023-10-02 RX ADMIN — ENOXAPARIN SODIUM 40 MG: 40 INJECTION SUBCUTANEOUS at 08:11

## 2023-10-02 RX ADMIN — BUSPIRONE HYDROCHLORIDE 30 MG: 10 TABLET ORAL at 08:13

## 2023-10-02 RX ADMIN — GABAPENTIN 300 MG: 300 CAPSULE ORAL at 20:27

## 2023-10-02 RX ADMIN — OXYCODONE HYDROCHLORIDE 5 MG: 5 SOLUTION ORAL at 05:53

## 2023-10-02 RX ADMIN — LEVALBUTEROL HYDROCHLORIDE 1.25 MG: 1.25 SOLUTION RESPIRATORY (INHALATION) at 13:23

## 2023-10-02 RX ADMIN — GUAIFENESIN 200 MG: 200 SOLUTION ORAL at 15:18

## 2023-10-02 RX ADMIN — CHLORHEXIDINE GLUCONATE 15 ML: 1.2 SOLUTION ORAL at 20:27

## 2023-10-02 RX ADMIN — ACYCLOVIR 400 MG: 200 CAPSULE ORAL at 17:14

## 2023-10-02 RX ADMIN — IPRATROPIUM BROMIDE 0.5 MG: 0.5 SOLUTION RESPIRATORY (INHALATION) at 07:18

## 2023-10-02 RX ADMIN — FAMOTIDINE 20 MG: 20 TABLET, FILM COATED ORAL at 08:11

## 2023-10-02 RX ADMIN — NYSTATIN 500000 UNITS: 100000 SUSPENSION ORAL at 17:14

## 2023-10-02 RX ADMIN — QUETIAPINE FUMARATE 50 MG: 25 TABLET ORAL at 21:34

## 2023-10-02 RX ADMIN — ACYCLOVIR 400 MG: 200 CAPSULE ORAL at 08:13

## 2023-10-02 RX ADMIN — LEVALBUTEROL HYDROCHLORIDE 1.25 MG: 1.25 SOLUTION RESPIRATORY (INHALATION) at 19:23

## 2023-10-02 RX ADMIN — SERTRALINE 75 MG: 25 TABLET, FILM COATED ORAL at 08:10

## 2023-10-02 RX ADMIN — CHLORHEXIDINE GLUCONATE 15 ML: 1.2 SOLUTION ORAL at 08:11

## 2023-10-02 NOTE — PROGRESS NOTES
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 CT AP with no lymphadenopathy; Brain MRI no lesion; stage II Large B Cell Lymphoma. She started her dose-adjusted systemic treatment, R-EPOCH, on 9/22/23 with the last dose on 9/25/23. She is scheduled to have Rituxan infusion on 9/27/23. Her CMP and uric acid have been unremarkable for tumor lysis. Overall, patient tolerated chemotherapy and immunotherapy very well; s/p rituxamab infusion on 9/27/23. She is also s/p IR gastrostomy tube placement on 9/27/23. Started  growth factor on 9/28/23. Of note, WBC 0.21 with absolute neutrophil count at 0.00. Commence with neutropenic precautions. Patient afebrile, no complaints of bone bone from growth factor injections since 9/28/23. Plan to continue with with filgrastim daily until [place] improves. Assessment and Plan  1. 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 Left Jugular Lymphadenopathy     3. Neutropenia   -Of note, WBC 0.21.  ANC at 0.00  -Neutropenic precautions  -S/p chemotherapy on 9/25/23, immunotherapy on 9/27/23  -Started with growth factor, Neupogen, on 9/28/23., plan to continue until [place] improves    Started systemic treatment: EPOCH-R Cycle 1 day 1-3: Etoposide, Doxorubicin, Vincristine , Cyclophosphamide , prednisone + Rituximab (9/22/23-9/26/23)    CMP  Uric acid 2.3 (10/1/23) < 2.5 (9/28) < 2.2 (9/26) < 2.4 < 2.7 < 2.8   WNL  Potassium 4.1 < 3.2 < 3.7 <3.4 < 3.6 < 3.4   WNL  Calcium 9.7 < 10.5 < 8.38 < 8.8   Total Bili 0.62 < 0.42 < 0.38 <0.30   WNL  Phosphorus 3.4 < 2.2 < 2.1 < 2.5  WNL  Creatinine 1.04 < 0.78 < 0.68   WNL  AST/ALT  18/20 < 14/23 <14/19   WNL    CBC  Hemoglobin 8.3 (10/2) < 9.4 (9/30) <11.4 <11.7 () < 11.2 < 10.6 < 10.9  WBC 0.21 (10/2) < 0.20 < 8.01 < 8.67 ()11.23 < 13.95 < 9.72  Platelets 68 (48/0) < 158 < 219 () < 204 < 221 < 181    Imagin23 CTA Chest Rt middle lobe consolidation (PNA), slight regression of previous hilar and mediastinal LNs (had recent PNA in 2023)      23 CT Soft Tissue Neck w contrast: soft tissue mass ~ 6.1 x 4.7 x 5.2 cm appears to be centered within Lt oropharynx involving multiple spaces and extends into the nasopharynx. .. multiple small jugular chain nodes on the left.      23 nasopharynx mass biopsy initial results positive for malignancy favor poorly differentiated carcinoma. Cannot exclude lymphoma. Flow cytometry pending.      PLAN:  1) S/p Day 3/3 for Cycle 1 EPOCH (etoposidevincristine, cyclophosphamide, doxorubicin) on 23, and rituxamab on 23. Started with growth factor on 23. Filgrastim (Neupogen) ordered at 5 mg/kg (375 mg) rounded to 300 mcg; will continue to watch CBC/ANC daily. 2) Neutropenic precautions    3) Continue to monitor for tumor lysis syndrome. CMP unremarkable    4) Continue to monitor renal and hepatic function (etoposide, cyclophosphamide). 5) Prophylaxis medications in place: allopurinol 300 mg daily, acyclovir 400 mg twice daily, fluconazole 400 mg and levofloxacin 500 mg daily     6) Outpatient follow up plan: Dr. Jelly Bonner 10/03/23; will need to reschedule with [facility]     Interval Hx:   Patient reported feeling extremely tired. She finished working with physical therapy this morning and had a moment of desaturation. Respiratory therapist at the bedside. Discussed plan with with patient about continuing with growth factor injections until neutrophil count improves. Discussed with bedside RN as well. Subjective:    Review of Systems   Constitutional: Positive for fatigue. Negative for chills, diaphoresis and fever. HENT: Negative for ear pain and sore throat.          Neck mass Eyes: Negative for pain and visual disturbance. Respiratory: Negative for cough, chest tightness, shortness of breath and wheezing. Cardiovascular: Negative for chest pain and palpitations. Gastrointestinal: Negative for abdominal pain, blood in stool, diarrhea, nausea and vomiting. Genitourinary: Negative for difficulty urinating, dysuria and hematuria. Musculoskeletal: Negative for arthralgias and back pain. Skin: Negative for color change and rash. Neurological: Positive for weakness. Negative for dizziness, seizures, syncope and headaches. Psychiatric/Behavioral: Negative for agitation and decreased concentration. The patient is not nervous/anxious. All other systems reviewed and are negative.     Objective:     Medication Administration - last 24 hours from 10/01/2023 1103 to 10/02/2023 1103       Date/Time Order Dose Route Action Action by     10/02/2023 0811 EDT chlorhexidine (PERIDEX) 0.12 % oral rinse 15 mL 15 mL Mouth/Throat Given Rani Baptiste RN     10/01/2023 2225 EDT chlorhexidine (PERIDEX) 0.12 % oral rinse 15 mL 15 mL Mouth/Throat Given [facility]     10/02/2023 0813 EDT busPIRone (BUSPAR) tablet 30 mg 30 mg Oral Given Rani Baptiste RN     10/01/2023 2225 EDT busPIRone (BUSPAR) tablet 30 mg 30 mg Oral Given [facility]     10/01/2023 1613 EDT busPIRone (BUSPAR) tablet 30 mg 30 mg Oral Given Aleksandr Panda RN     10/02/2023 3462 EDT nystatin (MYCOSTATIN) oral suspension 500,000 Units 500,000 Units Swish & Swallow Given Rani Baptiste RN     10/01/2023 2225 EDT nystatin (MYCOSTATIN) oral suspension 500,000 Units 500,000 Units Swish & Swallow Given [facility]     10/01/2023 1824 EDT nystatin (MYCOSTATIN) oral suspension 500,000 Units 500,000 Units Swish & Swallow Given Aleksandr Panda RN     10/01/2023 1233 EDT nystatin (MYCOSTATIN) oral suspension 500,000 Units 500,000 Units Swish & Swallow Given Aleksandr Panda RN     10/02/2023 0719 EDT levalbuterol Christine Calico) inhalation solution 1.25 mg 1.25 mg Nebulization Given Violette Anastasia, RT     10/02/2023 0404 EDT levalbuterol (XOPENEX) inhalation solution 1.25 mg 1.25 mg Nebulization Given Aaliyah Sleigh, RT     10/01/2023 2024 EDT levalbuterol (XOPENEX) inhalation solution 1.25 mg 1.25 mg Nebulization Given Aaliyah Sleigh, RT     10/01/2023 1504 EDT levalbuterol (Verlee Peres) inhalation solution 1.25 mg 1.25 mg Nebulization Not Given Vanessa Norman, RT     10/02/2023 0718 EDT ipratropium (ATROVENT) 0.02 % inhalation solution 0.5 mg 0.5 mg Nebulization Given Violette Anastasia, RT     10/02/2023 0404 EDT ipratropium (ATROVENT) 0.02 % inhalation solution 0.5 mg 0.5 mg Nebulization Given Aaliyah Sleigh, RT     10/01/2023 2024 EDT ipratropium (ATROVENT) 0.02 % inhalation solution 0.5 mg 0.5 mg Nebulization Given Aaliyah Sleigh, RT     10/01/2023 1505 EDT ipratropium (ATROVENT) 0.02 % inhalation solution 0.5 mg 0.5 mg Nebulization Not Given Vanessa Norman, RT     10/02/2023 0810 EDT gabapentin (NEURONTIN) capsule 300 mg 300 mg Oral Given Semaj Grimaldo, RN     10/01/2023 2225 EDT gabapentin (NEURONTIN) capsule 300 mg 300 mg Oral Given Samella Martinet     10/01/2023 1613 EDT gabapentin (NEURONTIN) capsule 300 mg 300 mg Oral Given Isadora Salt, RN     10/02/2023 2621 EDT nicotine (NICODERM CQ) 21 mg/24 hr TD 24 hr patch 21 mg 21 mg Transdermal Medication Applied Semaj Grimaldo, TINO     10/02/2023 0811 EDT nicotine (NICODERM CQ) 21 mg/24 hr TD 24 hr patch 21 mg 21 mg Transdermal Patch Removed Semaj Grimaldo, TINO     10/02/2023 0811 EDT enoxaparin (LOVENOX) subcutaneous injection 40 mg 40 mg Subcutaneous Given Semaj Grimaldo RN     10/02/2023 0810 EDT amLODIPine (NORVASC) tablet 10 mg 10 mg Oral Given Semaj Grimaldo RN     10/01/2023 2228 EDT senna oral syrup 8.8 mg 8.8 mg Per NG Tube Not Given Maria Ines Turner     10/02/2023 0811 EDT guaiFENesin (ROBITUSSIN) oral liquid 200 mg 200 mg Oral Given Semaj Grimaldo RN     10/02/2023 0301 EDT guaiFENesin (ROBITUSSIN) oral liquid 200 mg 200 mg Oral Given Álvaro Hansen     10/01/2023 2225 EDT guaiFENesin (ROBITUSSIN) oral liquid 200 mg 200 mg Oral Given Álvaro Hansen     10/01/2023 1415 EDT guaiFENesin (ROBITUSSIN) oral liquid 200 mg 200 mg Oral Given Melissa Aden RN     10/02/2023 6310 EDT famotidine (PEPCID) tablet 20 mg 20 mg Oral Given Fabiola Zhao RN     10/01/2023 1824 EDT famotidine (PEPCID) tablet 20 mg 20 mg Oral Given Melissa Aden RN     10/02/2023 7570 EDT allopurinol (ZYLOPRIM) tablet 300 mg 300 mg Oral Given Fabiola Zhao RN     10/02/2023 0809 EDT levofloxacin (LEVAQUIN) tablet 500 mg 500 mg Oral Given Fabiola Zhao RN     10/02/2023 9237 EDT fluconazole (DIFLUCAN) tablet 400 mg 400 mg Oral Given Fabiola Zhao RN     10/02/2023 0813 EDT acyclovir (ZOVIRAX) capsule 400 mg 400 mg Oral Given Fabiola Zhao RN     10/01/2023 1824 EDT acyclovir (ZOVIRAX) capsule 400 mg 400 mg Oral Given Melissa Aden RN     10/02/2023 5394 EDT sulfamethoxazole-trimethoprim (BACTRIM DS) 800-160 mg per tablet 1 tablet 1 tablet Oral Given Fabiola Zhao RN     10/02/2023 5337 EDT oxyCODONE (ROXICODONE) oral solution 5 mg 5 mg Oral Given Álvaro Hansen     10/01/2023 2224 EDT oxyCODONE (ROXICODONE) oral solution 5 mg 5 mg Oral Given Álvaro Hansen     10/01/2023 1415 EDT oxyCODONE (ROXICODONE) oral solution 5 mg 5 mg Oral Given Melissa Aden RN     10/02/2023 0719 EDT sodium chloride 3 % inhalation solution 4 mL 4 mL Nebulization Given Walter Baires, RT     10/02/2023 0404 EDT sodium chloride 3 % inhalation solution 4 mL 4 mL Nebulization Given Usman Solis, RT     10/01/2023 2024 EDT sodium chloride 3 % inhalation solution 4 mL 4 mL Nebulization Given Usman Solis, RT     10/01/2023 1505 EDT sodium chloride 3 % inhalation solution 4 mL 4 mL Nebulization Not Given Vanessa Norman, RT     10/02/2023 0811 EDT lisinopril (ZESTRIL) tablet 40 mg 40 mg Oral Given Fabiola Zhao RN     10/02/2023 0810 EDT carvedilol (COREG) tablet 12.5 mg 12.5 mg Oral Given Fabiola Zhao RN 10/01/2023 1613 EDT carvedilol (COREG) tablet 12.5 mg 12.5 mg Oral Given Kaleb Sun RN     10/02/2023 1018 EDT sertraline (ZOLOFT) tablet 75 mg 75 mg Per PEG Tube Given Nathalie King RN     10/01/2023 2225 EDT QUEtiapine (SEROquel) tablet 50 mg 50 mg Oral Given Velelizabeth Moeller     10/02/2023 0625 EDT sodium chloride 0.9 % inhalation solution **ADS Override Pull** --  Not Given Arianne Patricio, RT          /66 (BP Location: Right leg)   Pulse 90   Temp (!) 97.4 °F (36.3 °C) (Axillary)   Resp 17   Ht 5' 1" (1.549 m)   Wt 75.2 kg (165 lb 12.6 oz)   SpO2 92%   BMI 31.32 kg/m²       Physical Exam  Constitutional:       Appearance: She is not ill-appearing. HENT:      Head: Normocephalic and atraumatic. Comments: Trach cuff in place     Right Ear: External ear normal.      Left Ear: External ear normal.      Nose: No congestion or rhinorrhea. Mouth/Throat:      Mouth: Mucous membranes are moist.   Eyes:      Extraocular Movements: Extraocular movements intact. Conjunctiva/sclera: Conjunctivae normal.      Pupils: Pupils are equal, round, and reactive to light. Neck:      Comments: L neck mass  Cardiovascular:      Rate and Rhythm: Normal rate and regular rhythm. Pulses: Normal pulses. Heart sounds: Normal heart sounds. No murmur heard. No gallop. Pulmonary:      Effort: Pulmonary effort is normal. No respiratory distress. Breath sounds: No wheezing, rhonchi or rales. Abdominal:      General: Bowel sounds are normal. There is no distension. Palpations: There is no mass. Tenderness: There is no abdominal tenderness. There is no guarding. Comments: Peg tube in place, on tube feeding   Musculoskeletal:      Right lower leg: No edema. Left lower leg: No edema. Skin:     General: Skin is warm. Capillary Refill: Capillary refill takes less than 2 seconds. Coloration: Skin is not jaundiced or pale. Findings: Rash present.    Neurological: General: No focal deficit present. Mental Status: She is alert and oriented to person, place, and time. Psychiatric:         Mood and Affect: Mood normal.         Behavior: Behavior normal.         Thought Content: Thought content normal.         Judgment: Judgment normal.         Recent Results (from the past 48 hour(s))   Blood gas, venous    Collection Time: 09/30/23  2:32 PM   Result Value Ref Range    pH, Donald 7.406 (H) 7.300 - 7.400    pCO2, Donald 44.1 42.0 - 50.0 mm Hg    pO2, Donald 38.0 35.0 - 45.0 mm Hg    HCO3, Donald 27.1 24 - 30 mmol/L    Base Excess, Donald 2.1 mmol/L    O2 Content, Donald 8.6 ml/dL    O2 HGB, VENOUS 69.4 60.0 - 80.0 %    Non Vent Type- HFNC HFNC Flow     NV HFNC FIO2 90     HFNC FLOW LPM 60    Basic metabolic panel    Collection Time: 09/30/23  8:41 PM   Result Value Ref Range    Sodium 135 135 - 147 mmol/L    Potassium 3.6 3.5 - 5.3 mmol/L    Chloride 95 (L) 96 - 108 mmol/L    CO2 34 (H) 21 - 32 mmol/L    ANION GAP 6 mmol/L    BUN 27 (H) 5 - 25 mg/dL    Creatinine 1.02 0.60 - 1.30 mg/dL    Glucose 135 65 - 140 mg/dL    Calcium 8.5 8.4 - 10.2 mg/dL    eGFR 56 ml/min/1.73sq m   Blood culture    Collection Time: 09/30/23  8:41 PM    Specimen: Arm, Left; Blood   Result Value Ref Range    Blood Culture No Growth at 24 hrs. Lactic acid, plasma (w/reflex if result > 2.0)    Collection Time: 09/30/23  8:41 PM   Result Value Ref Range    LACTIC ACID 1.0 0.5 - 2.0 mmol/L   Blood culture    Collection Time: 10/01/23  3:40 AM    Specimen: Arm, Left; Blood   Result Value Ref Range    Blood Culture Received in Microbiology Lab. Culture in Progress.     CBC and differential    Collection Time: 10/01/23  3:59 AM   Result Value Ref Range    WBC 0.20 (LL) 4.31 - 10.16 Thousand/uL    RBC 2.79 (L) 3.81 - 5.12 Million/uL    Hemoglobin 8.6 (L) 11.5 - 15.4 g/dL    Hematocrit 27.1 (L) 34.8 - 46.1 %    MCV 97 82 - 98 fL    MCH 30.8 26.8 - 34.3 pg    MCHC 31.7 31.4 - 37.4 g/dL    RDW 15.3 (H) 11.6 - 15.1 % MPV 9.7 8.9 - 12.7 fL    Platelets 74 (L) 283 - 390 Thousands/uL   Smear Review(Phlebs Do Not Order)    Collection Time: 10/01/23  3:59 AM   Result Value Ref Range    RBC Morphology Present     Platelet Estimate Decreased (A) Adequate    Anisocytosis Present     Poikilocytes Present    Uric acid    Collection Time: 10/01/23  4:01 AM   Result Value Ref Range    Uric Acid 2.3 2.0 - 7.5 mg/dL   Phosphorus    Collection Time: 10/01/23  4:08 AM   Result Value Ref Range    Phosphorus 2.5 2.3 - 4.1 mg/dL   Magnesium    Collection Time: 10/01/23  4:08 AM   Result Value Ref Range    Magnesium 2.0 1.9 - 2.7 mg/dL   Basic metabolic panel    Collection Time: 10/01/23  4:08 AM   Result Value Ref Range    Sodium 135 135 - 147 mmol/L    Potassium 3.7 3.5 - 5.3 mmol/L    Chloride 97 96 - 108 mmol/L    CO2 33 (H) 21 - 32 mmol/L    ANION GAP 5 mmol/L    BUN 27 (H) 5 - 25 mg/dL    Creatinine 0.97 0.60 - 1.30 mg/dL    Glucose 168 (H) 65 - 140 mg/dL    Calcium 8.3 (L) 8.4 - 10.2 mg/dL    eGFR 59 ml/min/1.73sq m   Procalcitonin    Collection Time: 10/01/23  4:08 AM   Result Value Ref Range    Procalcitonin 0.53 (H) <=0.25 ng/ml   Sputum culture and Gram stain    Collection Time: 10/01/23  9:34 AM    Specimen: Tracheal Aspirate; Sputum   Result Value Ref Range    Gram Stain Result 1+ Polys     Gram Stain Result No bacteria seen    Comprehensive metabolic panel    Collection Time: 10/02/23  5:27 AM   Result Value Ref Range    Sodium 135 135 - 147 mmol/L    Potassium 4.1 3.5 - 5.3 mmol/L    Chloride 97 96 - 108 mmol/L    CO2 34 (H) 21 - 32 mmol/L    ANION GAP 4 mmol/L    BUN 28 (H) 5 - 25 mg/dL    Creatinine 1.04 0.60 - 1.30 mg/dL    Glucose 144 (H) 65 - 140 mg/dL    Calcium 8.5 8.4 - 10.2 mg/dL    Corrected Calcium 9.7 8.3 - 10.1 mg/dL    AST 18 13 - 39 U/L    ALT 20 7 - 52 U/L    Alkaline Phosphatase 145 (H) 34 - 104 U/L    Total Protein 5.3 (L) 6.4 - 8.4 g/dL    Albumin 2.5 (L) 3.5 - 5.0 g/dL    Total Bilirubin 0.62 0.20 - 1.00 mg/dL eGFR 54 ml/min/1.73sq m   Magnesium    Collection Time: 10/02/23  5:27 AM   Result Value Ref Range    Magnesium 2.1 1.9 - 2.7 mg/dL   Phosphorus    Collection Time: 10/02/23  5:27 AM   Result Value Ref Range    Phosphorus 2.8 2.3 - 4.1 mg/dL   CBC and differential    Collection Time: 10/02/23  5:27 AM   Result Value Ref Range    WBC 0.21 (LL) 4.31 - 10.16 Thousand/uL    RBC 2.75 (L) 3.81 - 5.12 Million/uL    Hemoglobin 8.3 (L) 11.5 - 15.4 g/dL    Hematocrit 26.8 (L) 34.8 - 46.1 %    MCV 98 82 - 98 fL    MCH 30.2 26.8 - 34.3 pg    MCHC 31.0 (L) 31.4 - 37.4 g/dL    RDW 15.4 (H) 11.6 - 15.1 %    MPV 10.1 8.9 - 12.7 fL    Platelets 68 (L) 408 - 390 Thousands/uL   Calcium, ionized    Collection Time: 10/02/23  5:27 AM   Result Value Ref Range    Calcium, Ionized 1.08 (L) 1.12 - 1.32 mmol/L   Manual Differential(PHLEBS Do Not Order)    Collection Time: 10/02/23  5:27 AM   Result Value Ref Range    Segmented % 0 (L) 43 - 75 %    Lymphocytes % 83 (H) 14 - 44 %    Monocytes % 13 (H) 4 - 12 %    Eosinophils, % 2 0 - 6 %    Basophils % 0 0 - 1 %    Atypical Lymphocytes % 2 (H) <=0 %    Absolute Neutrophils 0.00 (L) 1.85 - 7.62 Thousand/uL    Lymphocytes Absolute 0.18 (L) 0.60 - 4.47 Thousand/uL    Monocytes Absolute 0.03 0.00 - 1.22 Thousand/uL    Eosinophils Absolute 0.00 0.00 - 0.40 Thousand/uL    Basophils Absolute 0.00 0.00 - 0.10 Thousand/uL    Total Counted      RBC Morphology Normal     Platelet Estimate Decreased (A) Adequate       US right upper quadrant    Result Date: 9/22/2023  Narrative: RIGHT UPPER QUADRANT ULTRASOUND INDICATION:     CT finding N/V +Cancino. COMPARISON: CT abdomen/pelvis 9/21/2023 TECHNIQUE:   Real-time ultrasound of the right upper quadrant was performed with a curvilinear transducer with both volumetric sweeps and still imaging techniques. FINDINGS: Evaluation limited as per sonographer's note in PACS. PANCREAS:  Visualized portions of the pancreas are within normal limits.  AORTA AND IVC: Visualized portions are normal for patient age. LIVER: Size:  Within normal range. The liver measures 15.3 cm in the midclavicular line. Contour:  Surface contour is smooth. Parenchyma:  Echogenicity and echotexture are within normal limits. No liver mass identified. Limited imaging of the main portal vein shows it to be patent and hepatopetal. BILIARY: The gallbladder is normal in caliber. Gallbladder wall thickness upper limits of normal. No pericholecystic fluid. There is gallbladder sludge without evidence for shadowing calculi. No sonographic Cancino sign. No intrahepatic biliary dilatation. CBD measures 4.0 mm. No choledocholithiasis. KIDNEY: Right kidney measures 11.9 x 5.2 x 6.4 cm. Volume 207.6 mL Several simple cysts, the largest of which measures 4.1 cm. 2.8 cm septated cyst in the upper pole. No hydronephrosis or perinephric collection. ASCITES:   None. Impression: No cholelithiasis. Gallbladder sludge is present with wall thickness upper limits of normal. Negative sonographic Cancino sign. Findings may be sequela of chronic gallbladder dysfunction. Consider follow-up with a HIDA scan if it would alter patient management. Workstation performed: [id number]     CT abdomen pelvis w contrast    Result Date: 9/21/2023  Narrative: CT ABDOMEN AND PELVIS WITH IV CONTRAST INDICATION:   Head/neck cancer, staging lymohoma staging prior to treatment with chemo. COMPARISON: 9/13/2023. TECHNIQUE:  CT examination of the abdomen and pelvis was performed. Multiplanar 2D reformatted images were created from the source data. This examination, like all CT scans performed in the [facility], was performed utilizing techniques to minimize radiation dose exposure, including the use of iterative reconstruction and automated exposure control. Radiation dose length product (DLP) for this visit:  813 mGy-cm IV Contrast:  100 mL of iohexol (OMNIPAQUE) Enteric Contrast:  Enteric contrast was not administered. FINDINGS: Mildly degraded by respiratory motion. ABDOMEN LOWER CHEST: There is bibasilar atelectasis. LIVER/BILIARY TREE:  Unremarkable. GALLBLADDER:  No calcified gallstones. There may be mild gallbladder wall thickening though evaluation is degraded by respiratory motion. No surrounding inflammatory changes. SPLEEN:  Unremarkable. PANCREAS:  Unremarkable. ADRENAL GLANDS:  Unremarkable. KIDNEYS/URETERS: Multiple bilateral renal cysts. No hydronephrosis. STOMACH AND BOWEL: There is colonic diverticulosis without evidence of acute diverticulitis. APPENDIX:  No findings to suggest appendicitis. ABDOMINOPELVIC CAVITY:  No ascites. No pneumoperitoneum. No lymphadenopathy. VESSELS:  Unremarkable for patient's age. PELVIS REPRODUCTIVE ORGANS:  Unremarkable for patient's age. URINARY BLADDER:  Unremarkable. ABDOMINAL WALL/INGUINAL REGIONS:  Fat containing right inguinal hernia noted. Fat-containing umbilical hernia. OSSEOUS STRUCTURES: Again seen is a destructive lytic lesion in the T12 vertebral body with sclerotic borders, progressed from 2013 and with chronic appearing pathologic fracture. Impression: 1. No abdominal lymphadenopathy. 2.  Question gallbladder wall thickening versus motion artifact. If there is clinical concern for gallbladder pathology, ultrasound is recommended. 3.  Chronic T12 lytic lesion with pathologic fracture. Workstation performed: [id number]     MRI soft tissue neck wo and w contrast    Result Date: 9/21/2023  Narrative: MRI OF THE SOFT TISSUES OF THE NECK - WITH AND WITHOUT CONTRAST INDICATION: Oropharyngeal mass s/p biopsy (+) for carcinoma COMPARISON: Neck CT from 9/14/2023 TECHNIQUE:  Multiplanar, multisequence imaging of the soft tissues of the neck was performed before and after gadolinium administration. . IV Contrast:  7.5 mL of Gadobutrol injection (SINGLE-DOSE) IMAGE QUALITY: Motion degrades images. FINDINGS: VISUALIZED BRAIN PARENCHYMA: No acute or suspicious findings.  Please see today's brain MR report. VISUALIZED ORBITS AND PARANASAL SINUSES: Globes and orbits are within normal limits. Pan paranasal sinus mucosal thickening, greatest involving ethmoids and sphenoids. NASAL CAVITY, NASOPHARYNX, and OROHYPOPHARYNX and LARYNX: Approximately 7.5 x 4.0 x 7.8 cm (length X depth X width) soft tissue mass, epicenter likely the left lateral pharyngeal recess. Enhances and restricts diffusion. Central, irregular fluid signal intensity area without enhancement appears contiguous with fluid around an endotracheal tube, likely trapped secretions and circumferential mass. Mass extends from the left greater than right nasopharynx superiorly to the cricoid cartilage inferiorly. Extends to the posterior nasal cavity. Displaces the soft palate, uvula and tongue base anteriorly. Oral cavity is otherwise within normal limits. Displaces the left pterygoid muscles anterolaterally. Extends posterior to the angle of the mandible approximately 1.4 cm, abutting and mildly laterally displacing the deep parotid, inseparable from the gland. Effaces the left parapharyngeal fat. Discrete epiglottis not seen, grossly anteriorly displaced. Extends along the left aryepiglottic fold and posterior pharyngeal wall to the to the inferior supraglottic airway, grossly terminating just above or at the left false cord. Extends to the left carotid space, encircles the carotid with severe narrowing and posterior-lateral displacement of the distal cervical and most proximal petrous vessel. Otherwise preserved left internal carotid flow-void. Posterior-lateral displacement  and occlusion of the internal jugular vein at the level of the angle of the mandible in the distal neck. Mass extends to the proximal jugular foramen. Laryngeal ventricles and true cords appear preserved. Normal fat signal  preserved in the cricoid and thyroid cartilages. Enteric and endotracheal tubes are displaced rightward. Trace retropharyngeal effusion. THYROID GLAND:   Within normal limits. PAROTID AND SUBMANDIBULAR GLANDS: Both submandibular and the right parotid glands are within normal limits. Deep left parotid involvement mentioned above. LYMPH NODES: Similar small jugular chain nodes more numerous on the left than the right, but none considered pathologically enlarged. 0.7 x 0.5 cm suspicious right retropharyngeal node (4/27). Left retropharyngeal space is obliterated by mass, no separate adenopathy. VASCULAR STRUCTURES: Left carotid a jugular involvement described above. Right carotid artery and jugular veins are within normal limits. THORACIC INLET: Similar to CT. Dependent lung opacities. CERVICAL SPINE: Normal appearance. OTHER: Left greater than right mastoid effusions with patchy left enhancement and restricted diffusion were described in today's brain MR report. Asymmetric opacity involves the left petrous apex. Normal appearance of the IACs and inner ear structures. No cerebellopontine angle mass. Impression: Known, large left neck mass described in detail above. 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 COMPARISON: 6/17/2017 CT of the brain TECHNIQUE:  Multiplanar/multisequence MRI of the brain was performed administration of contrast. IV Contrast:  7.5 mL of Gadobutrol injection (SINGLE-DOSE) IMAGE QUALITY:  Diagnostic. FINDINGS: BRAIN PARENCHYMA: No  hemorrhage, extra-axial fluid collection, acute infarct, mass effect or midline shift. Mild, focal patchy periventricular and subcortical white matter foci of abnormal T2 and FLAIR hyperintensity are nonspecific, but usually secondary to changes of microangiopathy. Small developmental venous angioma in the left posterior frontal lobe, typically clinically insignificant. No suspicious enhancement or masses.  VENTRICLES:  Within normal limits for age. Law Innocent AND PITUITARY GLAND:  Within normal limits. ORBITS:  Within normal limits. PARANASAL SINUSES: Mucosal thickening. VASCULATURE: Preserved skull base flow voids. Normal enhancement. CALVARIUM AND SKULL BASE: Bilateral mastoid effusions are likely secondary mass, related to eustachian tube obstruction from nasopharyngeal mass. Patient is also intubated. Small mucosal retention cyst at the right fossa of Rosenmuller. Small ill-defined foci of enhancement in the superior mastoid and asymmetric left mastoid restricted diffusion. No coalescence or discrete mass. Skull and visualized cervical spine are within normal limits. EXTRACRANIAL SOFT TISSUES:  A nasopharyngeal mass will be described in further detail on today's neck MR report. Impression: No evidence of brain metastases. 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 Asymmetric patchy enhancement and restricted diffusion in the left mastoid. The differential includes neoplastic involvement and infection. Workstation performed: [id number]     XR chest portable    Result Date: 9/20/2023  Narrative: CHEST INDICATION:   NG tube placement. COMPARISON: 9/17/2023 EXAM PERFORMED/VIEWS:  XR CHEST PORTABLE FINDINGS: Tracheostomy tube is in stable position. Distal portions of nasogastric tube are seen below the hemidiaphragm within the left upper abdomen. The tip of the tube extends beyond the inferior margin of this study. Heart shadow is enlarged but unchanged from prior exam. There is mild pulmonary vascular congestion. The left costophrenic angle is obscured. Hazy opacities are additionally noted within the right costophrenic angle. No evidence of acute osseous abnormality. Impression: 1. Nasogastric tube is seen with its distal portions within the stomach. The tip of the tube courses beyond the inferior margin of the study.  2. Pulmonary vascular congestion with obscuration of the left hemidiaphragm. This is stable from prior radiograph and may represent small left effusion versus consolidation. 3. Right basilar atelectasis versus trace right effusion. Workstation performed: [id number]     XR chest portable    Result Date: 9/18/2023  Narrative: CHEST INDICATION:   Assess. COMPARISON:  None EXAM PERFORMED/VIEWS:  XR CHEST PORTABLE Images: 2 FINDINGS: Tracheostomy tube is seen in appropriate position. A feeding tube is seen extending down below the dome of the diaphragm Cardiomegaly seen Increased lung markings seen suggest congestion left base is obscured Osseous structures appear within normal limits for patient age. Impression: Increased lung markings seen suggest congestion. The left base is obscured No worsening Workstation performed: [id number]     CT soft tissue neck w contrast    Result Date: 9/14/2023  Narrative: CT NECK WITH CONTRAST INDICATION:   Laryngeal edema COMPARISON:  None. TECHNIQUE:  Axial, sagittal, and coronal 2D reformatted images were created from the axial source data and submitted for interpretation. Radiation dose length product (DLP) for this visit:  769 mGy-cm . This examination, like all CT scans performed in the [place], was performed utilizing techniques to minimize radiation dose exposure, including the use of iterative reconstruction and automated exposure control. IV Contrast:  85 mL of iohexol (OMNIPAQUE) IMAGE QUALITY:  Diagnostic. FINDINGS: VISUALIZED BRAIN PARENCHYMA:  No acute intracranial pathology of the visualized brain parenchyma. VISUALIZED ORBITS: Normal visualized orbits. PARANASAL SINUSES: Normal visualized paranasal sinuses. Nasopharynx, oropharynx AND HYPOPHARYNX: There is a large heterogeneously enhancing mass identified within the neck involving multiple spaces. The origin is difficult to ascertain given the large size.  This mass measures approximately 6.1 x 4.7 x 5.2 cm and appears to be centered within the left oropharynx. The mass extends superiorly into the nasopharynx left more so than right and into the posterior aspect of the nasal cavity. The mass also extends across the midline within the oropharynx and inferiorly into the left lateral oropharynx but not below the laryngeal ventricle. The mass extends laterally into the infratemporal fossa and parapharyngeal fat and is inseparable from infratemporal musculature and deep lobe of the parotid gland. There is extension into the prevertebral space where the mass is inseparable from the anterior paraspinal muscle on the left and posteriorly and laterally into the carotid space where the mass is displacing and inseparable from jugular and carotid. The mass encases the cervical internal carotid artery just below the level of the skull base resulting in narrowing of the vessel and the mass compresses and occludes the jugular vein below the skull base. The vessel does reconstitute via collateral vessels as it  extends inferiorly within the neck. The mass extends superiorly into the skull base inseparable from the carotid canal and jugular foramen. There is severe airway compromise within the posterior oral cavity and superior oropharynx. ET tube and OG tube are present. THYROID GLAND:  Unremarkable. PAROTID AND SUBMANDIBULAR GLANDS: Normal parotid and submandibular glands other than the mass abutting the deep lobe of the left parotid gland. LYMPH NODES: Multiple small jugular chain nodes are seen on the left. VASCULAR STRUCTURES: As described above the mass partially encases the cervical internal carotid artery, narrowing the vessel and occludes the jugular vein from the skull base to the mid neck. Below this the vessel is unremarkable due to collateral reconstitution. THORACIC INLET: Posterior left upper lobe consolidation may represent atelectasis or pneumonia. Mild patchy groundglass opacity within the upper lung fields.  BONY STRUCTURES: At T1-2 there is a left paramedian bridging osteophyte resulting in mild to moderate left anterior lateral canal stenosis. Impression: Large enhancing soft tissue mass identified within the left neck involving multiple spaces. This appears to be centered within the left oropharynx with extensions as described above into multiple spaces. This results in significant airway compromise. 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 Small level 3 and level 4 nodes are seen within the neck. I personally discussed this study with ICU resident on 9/14/2023 4:44 PM. Workstation performed: [id number]     Bronchoscopy    Result Date: 9/14/2023  Narrative: Table formatting from the original result was not included. [address] [place] [address] [phone] DATE OF SERVICE: 9/14/23 PHYSICIAN(S): Attending: No Staff Documented Fellow: No Staff Documented INDICATION: Acute respiratory failure with hypoxia ([address]) POST-OP DIAGNOSIS: See the impression below. PREPROCEDURE: Standard airway preparation completed per respiratory therapy protocol. Informed consent was obtained. Images reviewed prior to the procedure. A Time Out was performed. No suspicion or identified risk for TB or other airborne infectious disease; bronchoscopy procedure being performed for diagnostic purposes. PROCEDURE: Bronchoscopy DETAILS OF PROCEDURE: Patient was taken to the procedure room where a time out was performed to confirm correct patient and correct procedure. The patient was already under sedation prior to the procedure. The patient's blood pressure, ECG, heart rate, level of consciousness, respirations and oxygen were monitored throughout the procedure. The patient experienced no blood loss. The scope was introduced through the endotracheal tube. The procedure was moderately difficult due to patient intolerance and persistent coughing. In response to procedure difficulty, deeper sedation was employed.  The patient tolerated the procedure well. There were no apparent adverse events. ANESTHESIA INFORMATION: ASA: ASA status not filed in the log. Anesthesia Type: Anesthesia type not filed in the log. FINDINGS: The lower trachea, main sujata, left main stem, KARTHIK, lingula, LLL, right main stem, RUL, bronchus intermedius, RML and RLL appeared normal. Left lower lung zone with no endobronchial lesions, left upper and lingula both appear normal Right upper, right middle and right lower lobes all appeared normal with no endobronchial lesions Endotracheal tube was retracted 3 cm under direct visualization with the bronchoscope Bronchoalveolar lavage was performed x1 in the right lateral segment (RB4) with 60 mL of saline instilled and a total return of 40 mL; the fluid appeared cloudy SPECIMENS: ID Type Source Tests Collected by Time Destination 1 :  Lavage Lung, Right Middle Lobe Bronchoalveolar Lavage PULMONARY CYTOLOGY Rafat Duran MD 9/14/2023 12:55 PM  A :  Bronchial Lung, Right Middle Lobe Bronchoalveolar Lavage FUNGAL CULTURE, VIRUS CULTURE, BRONCHIAL CULTURE AND GRAM STAIN, LEGIONELLA CULTURE, PNEUMOCYSTIS SMEAR BY DFA ([facility]), AFB CULTURE WITH STAIN Rafat Duran MD 9/14/2023 12:57 PM       Impression: BAL of the right middle lobe Endotracheal tube was retracted under direct visualization Tongue still appears swollen, vocal cords visualized outside of the endotracheal tube with the bronchoscope, no significant edema or mass noted RECOMMENDATION:  Follow-up infectious work-up      XR chest 1 view portable    Result Date: 9/13/2023  Narrative: CHEST INDICATION:   post intubation. COMPARISON: Same day chest radiograph and subsequent same day CT chest. Chest x-ray 8/27/2023. EXAM PERFORMED/VIEWS:  XR CHEST PORTABLE FINDINGS: Endotracheal tube terminates approximately 4 cm above the sujata. Heart shadow is enlarged but unchanged from prior exam. Bibasilar airspace opacities are again demonstrated. No appreciable pneumothorax.  No evidence of acute osseous abnormality. Lucent lesion within T12 is better demonstrated on same-day CT. Impression: 1. Endotracheal tube terminates 4 cm above the sujata. 2. Redemonstration of bibasilar airspace opacities. Workstation performed: [id number]     XR chest 1 view portable    Result Date: 9/13/2023  Narrative: CHEST INDICATION:   post intubation, adjusting ET Tube. COMPARISON:  None EXAM PERFORMED/VIEWS:  XR CHEST PORTABLE FINDINGS: Endotracheal tube terminates approximately 3 cm above the sujata. Cardiomediastinal silhouette appears unremarkable. Bibasilar airspace opacities are again noted. No appreciable pneumothorax or pleural effusion. No evidence of acute osseous abnormality. Lucent lesion within T12 is better demonstrated on same-day CT. Impression: 1. Endotracheal tube terminates 3.3 cm above the sujata. 2. Bibasilar airspace opacities are again demonstrated. Workstation performed: [id number]     XR chest portable    Result Date: 9/13/2023  Narrative: CHEST INDICATION:   sob. COMPARISON: 8/27/2023. Subsequent CT chest performed the same day. EXAM PERFORMED/VIEWS:  XR CHEST PORTABLE FINDINGS: Heart shadow is enlarged but unchanged from prior exam. Hazy opacities are noted within the right lung base, possibly atelectasis versus pneumonia. Left basilar opacity is similar to prior radiograph. No appreciable pneumothorax. No evidence of acute osseous abnormality. Cystic lesion within the T12 vertebral body is better characterized on same-day CT. Impression: 1. Hazy bibasilar airspace opacities which may represent atelectasis versus pneumonia. Left basilar opacity is similar to recent radiograph while right basilar opacity is new Workstation performed: [id number]     CTA ED chest PE Study    Result Date: 9/13/2023  Narrative: CTA - CHEST WITH IV CONTRAST - PULMONARY ANGIOGRAM INDICATION:   R/O PE. Reports shortness of breath since being discharged from the hospital 3 weeks ago for pneumonia. Fatigue. Productive cough with white sputum. Angioedema. COMPARISON: Chest radiograph 9/13/2023, chest CT 8/25/2023, thoracic spine CT 11/13/2013. TECHNIQUE: CT angiogram timed for optimal opacification of the pulmonary arteries. Axial, sagittal, and coronal 2D reformats created from source data. Coronal 3D MIP postprocessing on the acquisition scanner. Radiation dose length product (DLP):  472 mGy-cm . Radiation dose exposure minimized using iterative reconstruction and automated exposure control. IV Contrast:  85 mL of iohexol (OMNIPAQUE) FINDINGS: PULMONARY ARTERIES:  No pulmonary embolus. Moderate pulmonary artery enlargement. LUNGS: Mild patchy new consolidation in the medial segment right middle lobe. Improving opacity in the left upper lobe with mild residual atelectasis/scar. Redemonstration of mild dependent atelectasis in both lower lobes. Severe emphysema. AIRWAYS: No significant filling defects. ET tube 4 cm above the sujata. PLEURA:  Unremarkable. HEART/GREAT VESSELS: Moderate cardiomegaly. MEDIASTINUM AND PRASANTH: Slight regression of hilar and mediastinal lymphadenopathy. The largest node was previously 2.5 x 2.0 cm in the right infrahilar region and is now 1.9 x 1.3 cm (501/94). CHEST WALL AND LOWER NECK: Unremarkable. UPPER ABDOMEN: Right renal cysts. OSSEOUS STRUCTURES: Redemonstration of the large cystic lesion with sclerotic margins in T12 with destruction of the superior and inferior endplates, present as a much smaller thin-walled cystic lesion on the thoracic spine CT from 2013, imaged on a thoracic spine MRI from 2020. Impression: No pulmonary embolus. Patchy consolidation right middle lobe, new from 8/25/2023, compatible with pneumonia. Slight regression of previous hilar and mediastinal lymphadenopathy. Improving left upper lobe opacity which may have been due to pneumonia with residual atelectasis/scar. Persistent partial atelectasis of the lower lobes.  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 Workstation performed: [id number]     Echo complete w/ contrast if indicated    Result Date: 8/28/2023  Narrative: •  Left Ventricle: Left ventricular cavity size is small. Wall thickness is increased. There is severe concentric hypertrophy. The left ventricular ejection fraction is 60%. Systolic function is normal. Wall motion is normal. Diastolic function is mildly abnormal, consistent with grade I (abnormal) relaxation. There is no LV dynamic obstruction at rest. •  Right Ventricle: Right ventricular cavity size is normal. Systolic function is normal. •  Left Atrium: The atrium is mildly dilated. •  Mitral Valve: There is mild to moderate regurgitation. There is systolic anterior motion of the chordal apparatus with late peaking gradient 29 mmHg during Valsalva maneuver. •  Pericardium: There is a trivial pericardial effusion along the right atrial free wall. XR chest portable ICU    Result Date: 8/28/2023  Narrative: CHEST INDICATION:   Acute hypoxic respiratory failure. COMPARISON: 8/25/2023 EXAM PERFORMED/VIEWS:  XR CHEST PORTABLE ICU FINDINGS: Heart shadow is enlarged but unchanged from prior exam. There is stable left-sided volume loss secondary to left basilar atelectasis. No pneumothorax or pleural effusion. Osseous structures appear within normal limits for patient age. Impression: Stable volume loss on the left secondary to left lower lobe atelectasis. Workstation performed: [id number]     VAS lower limb venous duplex study, complete bilateral    Result Date: 8/27/2023  Narrative:  [facility] REPORT CLINICAL: Indications: Patient presents with bilateral lower extremity pain x 1 days. Operative History: cardiac surgery-date unknown. Risk Factors The patient has history of HTN and CKD. She has no history of Hyperlipidemia.    CONCLUSION:  Impression: RIGHT LOWER LIMB: No evidence of acute or chronic deep vein thrombosis. No evidence of superficial thrombophlebitis noted. Doppler evaluation shows a normal response to augmentation maneuvers. . Popliteal, posterior tibial and anterior tibial arterial Doppler waveform's are triphasic. LEFT LOWER LIMB: No evidence of acute or chronic deep vein thrombosis. No evidence of superficial thrombophlebitis noted. Doppler evaluation shows a normal response to augmentation maneuvers. Popliteal, posterior tibial and anterior tibial arterial Doppler waveform's are triphasic. SIGNATURE: Electronically Signed by: Ayo Morris on 2023-08-27 08:12:52 PM    CTA ED chest PE Study    Result Date: 8/25/2023  Narrative: CTA - CHEST WITH IV CONTRAST - PULMONARY ANGIOGRAM INDICATION:   Increased SOB, recent COVID diagnosis. COMPARISON: MRI from 3/18/2020 as well as bone scan from 7/2/2019 and thoracic spine from 11/13/2013 TECHNIQUE: CTA examination of the chest was performed using angiographic technique according to a protocol specifically tailored to evaluate for pulmonary embolism. Multiplanar 2D reformatted images were created from the source data. In addition, coronal 3D MIP postprocessing was performed on the acquisition scanner. The study is limited by some respiratory motion artifacts especially in the lower lobes on the left where there is crowding of vessels due to atelectatic changes. Radiation dose length product (DLP) for this visit:  370 mGy-cm . This examination, like all CT scans performed in the [facility], was performed utilizing techniques to minimize radiation dose exposure, including the use of iterative reconstruction and automated exposure control. IV Contrast:  80 mL of iohexol (OMNIPAQUE) FINDINGS: PULMONARY ARTERIAL TREE: Limited visualization left lower lobe however there is suspicion for a small embolus in the posterobasal segment on axial image 139, series 305 and coronal image 142.  No definite filling defect is seen elsewhere. The main pulmonary artery is significantly dilated at 4.2 cm. The RV LV ratio is elevated at 1.1 cm. The primary pulmonary vascular stent minutes are also dilated chronicity is uncertain. LUNGS: Emphysema is noted with blebs at the apices. There is peripheral consolidation in the left upper lobe. Air bronchogram is not well seen and there is mucous occlusion of the left mainstem bronchus with volume loss of the left upper lobe as well as  left lower lobe to a lesser extent. Some aeration in the left lower lobe is still visible. PLEURA:  Unremarkable. HEART/GREAT VESSELS:  Unremarkable for patient's age. No thoracic aortic aneurysm. MEDIASTINUM AND PRASANTH: 10 mm pretracheal node is identified. 9 mm superior mediastinal node is identified. 10 mm prevascular node is identified. Subcarinal node measures 1.5 cm. Hilar adenopathy is also demonstrated. Right hilar node is larger measuring 1.8 cm in short axis on image 138. CHEST WALL AND LOWER NECK:   Unremarkable. VISUALIZED STRUCTURES IN THE UPPER ABDOMEN: Low-density cyst is seen in the right kidney. . OSSEOUS STRUCTURES: There appears to be a destructive lesion involving the T12 vertebral body eroding both endplates and much of the vertebral body this does not appear to represent a Schmorl's node. A cystic lesion was noted in 2013 at this location however the current lesion is larger with loss of endplates. MRI also imaged this lesion in 2020 the lesion appears somewhat larger on the current examination however. Impression: Limited study. There is equivocal embolus in the posterior basal segment left lower lobe. Other smaller segments are difficult to assess due to motion and vascular crowding. There is mucous plugging in the left mainstem bronchus with volume loss in portions of the left lower lobe and left upper lobe. Aspiration pneumonia is not excluded.  There is dilatation of the main pulmonary artery and proximal pulmonary segment suggesting pulmonary hypertension this is more likely chronic than acute given the degree of distention. Emphysema is present. There is significant mediastinal and hilar adenopathy suggesting underlying neoplasm more likely than simple reactive nodes. Enlarging lesion at T12 is again demonstrated of uncertain etiology. This has been present since 2013. Perhaps a plasmacytoma is a consideration. Neoplastic work-up is advised. Pulmonology consultation is also advised to assess endobronchial obstruction on the left. This was discussed with resident physician Dr. Juan Couch at 4:58 p.m. Follow-up was marked in the epic system Workstation performed: [id number]     XR chest 1 view portable    Result Date: 8/25/2023  Narrative: CHEST INDICATION:   SOB. COMPARISON: 8/21/2021 EXAM PERFORMED/VIEWS:  XR CHEST PORTABLE Single view FINDINGS: Development of CHF. Probable left basal infiltrate. Cardiomediastinal silhouette has become more enlarged. No pneumothorax or pleural effusion. Osseous structures appear within normal limits for patient age. Impression: Increased cardiomegaly. Development of CHF. Probable left basal infiltrate Workstation performed: [id number]       I have personally reviewed labs, imaging studies, and pertinent reports. This note has been generated by voice recognition software system. Therefore, there may be spelling, grammar, and or syntax errors. Please contact if questions arise.      Nathan Lepe, DO   Hematology and Medical Oncology - PGY [place]

## 2023-10-02 NOTE — ASSESSMENT & PLAN NOTE
Lab Results   Component Value Date    EGFR 26 10/07/2023    EGFR 23 10/06/2023    EGFR 24 10/05/2023    CREATININE 1.90 (H) 10/07/2023    CREATININE 2.07 (H) 10/06/2023    CREATININE 2.00 (H) 10/05/2023     Baseline Cr appears to be between 0.75-1.1. Patient received 2 doses of Bumex IV 2 g yesterday as she had not been responding appropriately to IV 40 Lasix daily. Creatinine went up by 0.5 meeting criteria for an PO. This may be prerenal intrinsic or postrenal.  Potential prerenal causes include reduced kidney perfusion due to lisinopril. Intrinsic can also be lisinopril due to ATN, AIN from chemo medications, TLS, crystaline nephropathy from acyclovir, rituxan nephrotoxicity, filgrastim glomerulonephritis. Post renal obstructive could be from urine retention from vincristine, cyclophosphamide bladder fibrosis. Suspect most likely due to medications as a combination of prerenal and intrinsic. Cr up to 2 today. FENa was 0.2%, UA shows no casts or signs of infection, urine eosinophils 0%. Patient likely has prerenal PO from volume depletion. Received 2 L NS and 1 pRBC and cr went up by 0.07 still and Na and Cl went down, suspect that volume prevented further cr rise and free water excretion impaired by kidney function, allowing hyponatremia to increase. Creatinine increase is likely plateaued. Went down today. Patient did receive 20 of Lasix overnight for volume overload which she responded well to.   Suspect that now that nephrotoxic medications have been stopped she will continue to respond well to Lasix and creatinine will continue to downtrend    - Lasix 40 IV twice daily if patient agrees with goal -500 mL  Cr continues to trend down today 1.64. K elevated at 5.3    Plan  · Received Lasix 40 this morning with goal of -500 by noon  · Monitor BMP at noon, post Lasix dose

## 2023-10-02 NOTE — ASSESSMENT & PLAN NOTE
Biopsy on 9/17: Large B-cell lymphoma, stage II. First inpatient chemotherapy 9/22/2023- with R-EPOCH. 9/27 Rituxan. 9/28 Filgrastim. acyclovir, Zyloprim, Diflucan, Levaquin, Zofran, Bactrim for chemo prophylaxis, all discontinued as Nafisa reached 4200 yesterday with heme-onc's approval. Todays CBC WBC up from 6.34 to 12.26. Low-grade fever of 100 yesterday about an hour and a half after bronchoscopy. I suspect this fever was just reactive and that the leukocytosis is just secondary to the filgrastim which was just stopped yesterday.     Plan:  · Inpatient and outpatient hematology oncology following, next inpatient [facility] cycle starts 10/10  · Monitor ANC on CBC   · Repeat BCx if patient spikes another fever

## 2023-10-02 NOTE — SPEECH THERAPY NOTE
Speech Language/Pathology    Speech/Language Pathology Cancel Note    Patient Name: Trina Cantu  JXBPS'K Date: 10/2/2023     Pt has been on high flow trach collar, now on vent. Spoke w/ critical care, will sign off for now due to high risk for aspiration and increasing respiratory compromise, pt not appropriate for po trials.    reconsult when/if needed      Gretchen Boo CCC-SLP  Speech Pathologist  Available via  tiger text

## 2023-10-02 NOTE — ASSESSMENT & PLAN NOTE
Patient on Zoloft 75 daily and Seroquel evening. Patient is depressed, met with palliative 10/5 for goals of care. Patient was firm that she wants to live and to fight, she is just tired.

## 2023-10-02 NOTE — ASSESSMENT & PLAN NOTE
Lab Results   Component Value Date    CHOLESTEROL 203 (H) 01/24/2023    CHOLESTEROL 177 08/11/2022    CHOLESTEROL 184 07/08/2020     Lab Results   Component Value Date    HDL 45 (L) 01/24/2023    HDL 37 (L) 08/11/2022    HDL 44 (L) 07/08/2020     Lab Results   Component Value Date    TRIG 166 (H) 09/16/2023    TRIG 160 (H) 01/24/2023    TRIG 108 08/11/2022     Lab Results   Component Value Date    NONHDLC 146 07/12/2018    [address] [facility] 207 (H) 04/29/2013     Continue outpatient diet and lifestyle modification.

## 2023-10-02 NOTE — PHYSICAL THERAPY NOTE
PT TREATMENT     10/02/23 1200   PT Last Visit   PT Visit Date 10/02/23   Note Type   Note Type Treatment   Pain Assessment   Pain Assessment Tool Olvera-Baker FACES   Olvera-Baker FACES Pain Rating 0   Restrictions/Precautions   Other Precautions Fall Risk;Bed Alarm; Chair Alarm;Multiple lines;Cognitive;Telemetry  (Feeding tube; trach with trach collar on high flow)   General   Chart Reviewed Yes   Family/Caregiver Present No   Cognition   Arousal/Participation Persistent stimuli required   Attention Attends with cues to redirect   Following Commands Follows one step commands inconsistently   Subjective   Subjective Patient with little to no verbalization during PT session   Bed Mobility   Additional Comments Patient received out of bed in chair   Transfers   Sit to Stand 4  Minimal assistance   Additional items Assist x 1;Verbal cues; Increased time required;Armrests   Stand to Sit 4  Minimal assistance   Additional items Assist x 1;Verbal cues; Increased time required;Armrests   Additional Comments Patient stood with a roller walker x2 reps with rest in between, each trial x1 minute. Patient did not take any steps   Balance   Static Sitting Fair   Static Standing Fair -  (With roller walker)   Endurance Deficit   Endurance Deficit Yes   Endurance Deficit Description Limited standing and overall activity endurance   Activity Tolerance   Activity Tolerance Patient limited by fatigue;Treatment limited secondary to medical complications (Comment)  (Weakness and deconditioning; multiple lines)   Exercises   Heelslides PROM;AAROM; Bilateral;10 reps   Hip Abduction PROM;AAROM; Bilateral;10 reps   Hip Adduction PROM;AAROM; Bilateral;10 reps  (Hip IR/ER)   Knee AROM Short Arc Quad PROM;AAROM; Bilateral;10 reps   Knee AROM Long Arc Quad PROM;AAROM; Bilateral;10 reps   Ankle Pumps AAROM;AROM; Bilateral;10 reps   Assessment   Problem List Decreased strength;Decreased endurance; Impaired balance;Decreased mobility; Decreased coordination;Decreased safety awareness   Assessment Patient seen for PT session this morning. Patient with limited participation especially with bilateral lower extremity exercise which was performed with patient in her recliner chair. Patient noted with improved participation with transfers and standing with a roller walker. While admitted, patient will continue to benefit from skilled physical therapy services to further increase her strength, active range of motion, functional mobility, transfers and ambulation with a roller walker as tolerated. When medically stable for discharge, patient remains appropriate for postacute rehab services. Physical therapy goals updated with today's treatment session. The patient's AM-PAC Basic Mobility Inpatient Short Form Raw Score is 15. A Raw score of less than or equal to 16 suggests the patient may benefit from discharge to post-acute rehabilitation services. Please also refer to the recommendation of the Physical Therapist for safe discharge planning. Goals   STG Expiration Date 10/12/23   Short Term Goal #1 Pt will be able to consistently complete bed mobility with S in order to promote independence and mobility. Pt will be able to consistently complete all transfers with S in order to increase function and decrease burden of care. Pt will be able to ambulate >25 ft with S and use of LRAD to mimic household distances and increase pt function. Pt will improve LE strength to >/= 4/5 in order to increase strength and functional potential.   Pt will improve Barthel score to 45/100 in order to increase independence with ADLs. Plan   Treatment/Interventions ADL retraining;Functional transfer training;LE strengthening/ROM; Therapeutic exercise; Endurance training;Patient/family training;Equipment eval/education; Bed mobility;Gait training; Compensatory technique education;OT;Spoke to case management;Spoke to nursing   PT Frequency 3-5x/wk   Recommendation   PT Discharge Recommendation Post acute rehabilitation services   AM-PAC Basic Mobility Inpatient   Turning in Flat Bed Without Bedrails 3   Lying on Back to Sitting on Edge of Flat Bed Without Bedrails 3   Moving Bed to Chair 3   Standing Up From Chair Using Arms 3   Walk in Room 2   Climb 3-5 Stairs With Railing 1   Basic Mobility Inpatient Raw Score 15   Basic Mobility Standardized Score 36.97   Highest Level Of Mobility   JH-HLM Goal 4: Move to chair/commode   JH-HLM Achieved 5: Stand (1 or more minutes)   Education   Education Provided Mobility training;Assistive device   Patient Explanation/teachback used; Reinforcement needed   End of Consult   Patient Position at End of Consult Bedside chair;Bed/Chair alarm activated; All needs within reach   [employer] Number  [address] [place], [place] [id number]     Portions of the documentation may have been created using voice recognition software. Occasional wrong word or sound alike substitutions may have occurred due to the inherent limitation of the voice recognition software. Read the chart carefully and recognize, using context, where substitutions have occurred.

## 2023-10-02 NOTE — PLAN OF CARE
Problem: PHYSICAL THERAPY ADULT  Goal: Performs mobility at highest level of function for planned discharge setting. See evaluation for individualized goals. Description: Treatment/Interventions: Functional transfer training, LE strengthening/ROM, Elevations, Therapeutic exercise, Endurance training, Patient/family training, Equipment eval/education, Bed mobility, Gait training, Spoke to nursing, Spoke to case management          See flowsheet documentation for full assessment, interventions and recommendations. Note: Prognosis: Fair  Problem List: Decreased strength, Decreased endurance, Impaired balance, Decreased mobility, Decreased coordination, Decreased safety awareness  Assessment: Patient seen for PT session this morning. Patient with limited participation especially with bilateral lower extremity exercise which was performed with patient in her recliner chair. Patient noted with improved participation with transfers and standing with a roller walker. While admitted, patient will continue to benefit from skilled physical therapy services to further increase her strength, active range of motion, functional mobility, transfers and ambulation with a roller walker as tolerated. When medically stable for discharge, patient remains appropriate for postacute rehab services. Physical therapy goals updated with today's treatment session. Barriers to Discharge: Inaccessible home environment, Decreased caregiver support (Pt is at home alone during the day; + PAUL her home)     PT Discharge Recommendation: Post acute rehabilitation services    See flowsheet documentation for full assessment.

## 2023-10-02 NOTE — ASSESSMENT & PLAN NOTE
Wt Readings from Last 3 Encounters:   10/07/23 81.6 kg (179 lb 14.3 oz)   09/07/23 74.6 kg (164 lb 6.4 oz)   08/30/23 73.3 kg (161 lb 9.6 oz)     Lab Results   Component Value Date    LVEF 60 08/28/2023     (H) 09/29/2023     (H) 09/13/2023     Echo 8/28/23: LVEF 60%. CXR 9/22: Persistent pulmonary venous congestion with small effusions and bibasilar atelectasis. Patient has not responded well to Lasix 40 or Bumex 2. Baseline creatinine around 0.8, yesterday 2.07 but today 1.9 so likely plateauing PO. Patient received 20 of Lasix overnight responded well.  Refused additional lasix today, volume overloaded.     Plan:  • BMP, magnesium; Goal Mg > 2 and K > 4; Replete prn  • Measure I/O

## 2023-10-02 NOTE — ASSESSMENT & PLAN NOTE
Right forearm soreness. BUE ultrasound in setting of high risk of DVT. RUE US[de-identified] No evidence of acute or chronic deep vein thrombosis. 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 Lovenox 40 no longer appropriate with PO.   As PO is trending down we can consider going back up to Lovenox 40, will reevaluate after creatinine drops below 1.5    -Continue DVT ppx with Lovenox 30 renal dosing

## 2023-10-02 NOTE — ASSESSMENT & PLAN NOTE
Patient reports abdominal pain which is unchanged since 9/22 no guarding on exam. Takes Famotidine for GERD at home.  Alk phos 10/2 145, today 299.     - Continue Famotidine  - On bowel regimen with Senna and Miralax prn

## 2023-10-02 NOTE — ASSESSMENT & PLAN NOTE
POA with acute respiratory failure, SOB. On physical in [place] ([place]) ED: Swollen tongue, swelling soft and hard palate and almost the head of all phalanx is completely closed. Severe angioedema. Decadron 10 mg, Benadryl 25 mg given. Initially refused but eventually agreed with intubation. 2/2 oropharyngeal mass compression.     Plan:  • Cuffless trach placed 9/17, transitioned to cuffed 10/3  • Tolerating trach without issue  • See acute respiratory failure with hypoxia secondary to oropharyngeal mass above

## 2023-10-02 NOTE — RESPIRATORY THERAPY NOTE
Rt to patient room to wean O2. Pt on vent and was weaned to FIO2 90%, her Sao2 dropping to **% at times, will continue to wean as pt tolerates.

## 2023-10-02 NOTE — PROGRESS NOTES
[address] [facility]  Progress Note: Critical Care  Name: James Olmstead 69 y.o. female I MRN: [id number]  Unit/Bed#: ICU 03 I Date of Admission: 9/13/2023   Date of Service: 10/2/2023 I Hospital Day: 23    Assessment/Plan   * Acute respiratory failure with hypoxia secondary to oropharyngeal mass  Assessment & Plan  Cuffless trach in place since 9/17. Oxygen requirements increased in the past 24 hours necessitating pressure support ventilation. Patient appears to be quite depressed with trach in place       Plan:  • Have palliative care check in again today  • Atrovent/Xopenex  • Airway clearance protocol. IS.  • Continue 3% saline nebs     Large cell lymphoma (HCC)  Assessment & Plan  Biopsy on 9/17: Large B-cell lymphoma, germinal center B-cell type, c-MYC and BCL-2 double expression. Ki-67 proliferation index is up to 90%; FISH & NGS pending. Staging work-up: Currently stage II. First inpatient chemotherapy 9/22/2023- with R-EPOCH. On acyclovir, Zyloprim, Diflucan, Levaquin, Zofran, Bactrim for chemo prophylaxis    Plan:  · Inpatient hematology oncology following. Recommendation appreciated regarding continuation of prophylaxis  · Outpatient follow-up with hematology oncology. (HFpEF) heart failure with preserved ejection fraction [facility])  Assessment & Plan  Wt Readings from Last 3 Encounters:   10/02/23 75.2 kg (165 lb 12.6 oz)   09/07/23 74.6 kg (164 lb 6.4 oz)   08/30/23 73.3 kg (161 lb 9.6 oz)     Lab Results   Component Value Date    LVEF 60 08/28/2023     (H) 09/29/2023     (H) 09/13/2023     Echo 8/28/23: LVEF 60%.  CXR 9/22: Persistent pulmonary venous congestion with small effusions and bibasilar atelectasis.       Plan:  • CMP, magnesium; Goal Mg > 2 and K > 4; Replete prn  • HOB > 30°, Daily standing weights, Measure I/O        Depression  Assessment & Plan  Patient on Zoloft increased from 50-75 daily yesterday, and Seroquel started yesterday evening after confirming that QT not prolonged on EKG. Patient is still quite depressed and would benefit from additional goals of care discussion with palliative care    Generalized abdominal pain  Assessment & Plan  Patient reports abdominal pain is mainly in the epigastric area. Intermittently with coughing.    - Consider Bentyl PRN  - On bowel regimen    Chronic Superficial thrombophlebitis  Assessment & Plan  9/23: Patient C/o right forearm soreness. Bilateral upper extremity ultrasound was performed in setting of high risk of DVT. RIGHT UPPER LIMB U/S: No evidence of acute or chronic deep vein thrombosis. 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 Doppler evaluation shows a normal response to augmentation maneuvers.    -Continue DVT prophylaxis Lovenox    Blister (nonthermal) of left forearm, sequela  Assessment & Plan  9/17/23: Blisters of LUE noted, no signs of infection    · Daily wound care, monitor for signs of infection     Oropharyngeal mass  Assessment & Plan  · 9/14 Neck/ soft tissue CT: Large enhancing soft tissue mass identified within the left neck involving multiple spaces. This appears to be centered within the left oropharynx with extensions as described above into multiple spaces. This results in significant airway compromise. 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 Small level 3 and level 4 nodes are seen within the neck. · Tracheostomy by ENT, bx & addition testing performed  · Bx: Positive for malignancy, favor poorly differentiated carcinoma. Cannot entirely exclude a high grade large cell lymphoma. Portion of specimen submitted for flow cytometry. · H/o tobacco abuse, daily smoker. · Daughter was updated at bedside. · Preliminary biopsy: Large B-cell lymphoma, germinal center B-cell type, c-MYC and BCL-2 double expression. · R-EPOCH chemotherapy From 9/22/23 to 9/27/23.     Plan:  · ENT/ Med onc following, appreciated  · Follow final biopsy report  · See acute respiratory failure above      Angioedema  Assessment & Plan  POA with acute respiratory failure, SOB. On physical in [place] ([place]) ED: Swollen tongue, swelling soft and hard palate and almost the head of all phalanx is completely closed. Severe angioedema. Decadron 10 mg, Benadryl 25 mg given. Initially refused but eventually agreed with intubation. Prior episode of angioedema in 2020 noted. Suspected coadministration of increase the dose of tramadol and lisinopril. Per daughter, there is no recent change in medications except Trelegy inhaler, cefdinir. Etiology: more likely 2/2 oropharyngeal mass compression. Plan:  • Trach placed 9/17  • Tolerating trach, No issue    Ambulatory dysfunction  Assessment & Plan  Impacted by chronic pain syndrome, but medication regimen for this may also contribute to ambulatory dysfunction. At present patient requires tracheostomy and has a long way to go for rehabilitation. Will require PT/ OT eval before discharge. CKD (chronic kidney disease) stage 3, GFR 30-59 ml/min [facility])  Assessment & Plan  Lab Results   Component Value Date    EGFR 54 10/02/2023    EGFR 59 10/01/2023    EGFR 56 09/30/2023    CREATININE 1.04 10/02/2023    CREATININE 0.97 10/01/2023    CREATININE 1.02 09/30/2023     Stable. Continue to monitor on BMP    Pain syndrome, chronic  Assessment & Plan  Home regimen: Oxycodone 5 mg twice daily as needed. Tylenol 650 mg every 8 hours as needed. Gabapentin 600 mg 3 times daily. Medical marijuana. Per daughter, patient was overusing Tylenol over-the-counter. Pain mostly from lumbar radiculopathy. Impaired ambulatory dysfunction second to pain. Plan:  · Continue gabapentin 300 mg 3 times daily, scheduled oxycodone 5 mg 3 times daily, as needed Tylenol 650 mg every 6 hours. As needed fentanyl 50 mcg every 2 hours for severe pain.     Hyperlipidemia  Assessment & Plan  Lab Results   Component Value Date    CHOLESTEROL 203 (H) 01/24/2023    CHOLESTEROL 177 08/11/2022 CHOLESTEROL 184 07/08/2020     Lab Results   Component Value Date    HDL 45 (L) 01/24/2023    HDL 37 (L) 08/11/2022    HDL 44 (L) 07/08/2020     Lab Results   Component Value Date    TRIG 166 (H) 09/16/2023    TRIG 160 (H) 01/24/2023    TRIG 108 08/11/2022     Lab Results   Component Value Date    NONHDLC 146 07/12/2018    [address] 207 (H) 04/29/2013     Continue outpatient diet and lifestyle modification. Benign essential hypertension  Assessment & Plan  Well controlled at home on Amlodipine 10 mg daily, Coreg 25 mg twice daily, lisinopril 10 mg daily. Plan:  · Coreg 12.5 mg BID, Amlodipine 10 mg daily. Lisinopril 40 mg daily. · PRN hydralazine if BP >160. ICU Core Measures     A: Assess, Prevent, and Manage Pain · Has pain been assessed? Yes  · Need for changes to pain regimen? No   B: Both SAT/SAT  · N/A   C: Choice of Sedation · RASS Goal: 0 Alert and Calm or +1 Restless  · Need for changes to sedation or analgesia regimen? No   D: Delirium · CAM-ICU: Negative   E: Early Mobility  · Plan for early mobility? No   F: Family Engagement · Plan for family engagement today? No       Antibiotic Review: Continue broad spectrum secondary to severity of illness. Review of Invasive Devices:            Prophylaxis:  VTE VTE covered by:  enoxaparin, Subcutaneous, 40 mg at 10/02/23 6804       Stress Ulcer  covered byfamotidine (PEPCID) oral suspension 40 mg [[id number]], pantoprazole (PROTONIX) injection 40 mg [[id number]]          Subjective   HPI/24hr events:   Patient's oxygen requirement increased yesterday. Her Zoloft was increased and Seroquel started overnight. She is quite unhappy this morning. She endorses her baseline chronic pain, and feels she can breathe well on current settings. She does not want us to contact family today    Review of Systems   Constitutional: Negative for fever. Respiratory: Negative for shortness of breath and wheezing.     Cardiovascular: Negative for chest pain and leg swelling. Gastrointestinal: Positive for abdominal pain. Psychiatric/Behavioral: Positive for dysphoric mood. Objective                            Vitals I/O      Most Recent Min/Max in 24hrs   Temp (!) 97.4 °F (36.3 °C) Temp  Min: 97.4 °F (36.3 °C)  Max: 98.9 °F (37.2 °C)   Pulse 102 Pulse  Min: 79  Max: 102   Resp 17 Resp  Min: 15  Max: 29   /60 BP  Min: 103/62  Max: 150/68   O2 Sat 95 % SpO2  Min: 86 %  Max: 98 %      Intake/Output Summary (Last 24 hours) at 10/2/2023 0825  Last data filed at 10/2/2023 0600  Gross per 24 hour   Intake 1749 ml   Output 1050 ml   Net 699 ml         Diet Enteral/Parenteral; Tube Feeding No Oral Diet; Jevity 1.5; Continuous; 20; Every 4 hours     Invasive Monitoring Physical exam    Physical Exam  Constitutional:       Comments: Tracheostomy in place   Eyes:      Extraocular Movements: Extraocular movements intact. Cardiovascular:      Rate and Rhythm: Normal rate and regular rhythm. Heart sounds: Normal heart sounds. No murmur heard. Pulmonary:      Breath sounds: Normal breath sounds. Abdominal:      General: There is no distension. Tenderness: There is abdominal tenderness. There is no guarding. Musculoskeletal:      Right lower leg: No edema. Left lower leg: No edema. Psychiatric:      Comments: Flat affect           Diagnostic Studies      Imaging: CXR 10/1: Airspace disease at both lung bases may represent infiltrates and/or atelectasis. Diffuse groundglass opacities concerning for infection.  I have personally reviewed pertinent films in PACS     Medications:  Scheduled PRN   acyclovir, 400 mg, BID  allopurinol, 300 mg, Daily  amLODIPine, 10 mg, Daily  busPIRone, 30 mg, TID  carvedilol, 12.5 mg, BID With Meals  chlorhexidine, 15 mL, Q12H ASHLEY  enoxaparin, 40 mg, Q24H ASHLEY  famotidine, 20 mg, BID  fluconazole, 400 mg, Daily  gabapentin, 300 mg, TID  guaiFENesin, 200 mg, Q6H  levalbuterol, 1.25 mg, Q6H   And  ipratropium, 0.5 mg, Q6H  levofloxacin, 500 mg, Q24H ASHLEY  lisinopril, 40 mg, Daily  nicotine, 21 mg, Daily  nystatin, 500,000 Units, 4x Daily  oxyCODONE, 5 mg, Q8H  QUEtiapine, 50 mg, HS  senna, 8.8 mg, HS  sertraline, 75 mg, Daily  sodium chloride, ,   sodium chloride, 4 mL, Q6H  sulfamethoxazole-trimethoprim, 1 tablet, Once per day on Mon Wed Fri      acetaminophen, 650 mg, Q6H PRN  alteplase, 2 mg, Q1MIN PRN  hydrALAZINE, 10 mg, Q4H PRN  HYDROmorphone, 0.2 mg, Q6H PRN  levalbuterol, 1.25 mg, Q8H PRN  lidocaine 1% buffered, , PRN  ondansetron, 4 mg, Q8H PRN  oxyCODONE, 2.5 mg, Q4H PRN   Or  oxyCODONE, 5 mg, Q4H PRN  polyethylene glycol, 17 g, Daily PRN  sodium chloride, ,        Continuous          Labs:    CBC    Recent Labs     10/01/23  0359   WBC 0.20*   HGB 8.6*   HCT 27.1*   PLT 74*     BMP    Recent Labs     10/01/23  0408 10/02/23  0527   SODIUM 135 135   K 3.7 4.1   CL 97 97   CO2 33* 34*   AGAP 5 4   BUN 27* 28*   CREATININE 0.97 1.04   CALCIUM 8.3* 8.5       Coags    No recent results     Additional Electrolytes  Recent Labs     10/01/23  0408 10/02/23  0527   MG 2.0 2.1   PHOS 2.5 2.8   CAIONIZED  --  1.08*          Blood Gas    No recent results  Recent Labs     09/30/23  1432   PHVEN 7.406*   NWR5KHC 44.1   PO2VEN 38.0   [id number] 27.1   BEVEN 2.1    LFTs  Recent Labs     10/02/23  0527   ALT 20   AST 18   ALKPHOS 145*   ALB 2.5*   TBILI 0.62       Infectious  Recent Labs     10/01/23  0408   PROCALCITONI 0.53*     Glucose  Recent Labs     09/30/23  2041 10/01/23  0408 10/02/23  0527   GLUC 135 168* 144*                   Graciela Rizo MD

## 2023-10-02 NOTE — ASSESSMENT & PLAN NOTE
Takes Coreg 12.5 mg BID, Amlodipine 10 mg daily, and lisinopril 40 mg daily all discontinued at this time secondary to hypotension and PO.   If blood pressure is elevated, restart Coreg only    Stable currently    Plan:  · Monitor vitals

## 2023-10-02 NOTE — PROGRESS NOTES
Progress Note - Palliative & Supportive Care  [facility] SERVICE  69 y.o.  female  ICU 03/ICU 03   MRN: [id number]  Encounter: [id number]     Assessment  Acute respiratory failure with hypoxia  HFpEF  Ambulatory dysfunction  New onset right middle lobe pneumonia  PO  Oropharyngeal mass  Goals of care counseling  Palliative care encounter    Plan  1. Symptom Management  · Per ICU team at this time    2. Goals of Care  • Level 1 code status  • Continue disease directed therapies without limits  • Patient continues with chemotherapy and was temporarily weaned off ventilator pressure support. However, over the weekend her oxygen requirements increased and she is now back on ventilatory support. • Patient had PEG tube placed last week. • Patient drowsy at today's visit and minimally conversant. She gives permission to call her family. • Left voicemail message with daughter Sander Schneider to schedule family meeting to readdress goals of care with patient, her son Lucinda Millan, and daughter Sander Schneider  • No contact information on file for patient's son Lucinda Millan currently   • Goals of care discussions will be ongoing    3. Social Support  • Patient supported by her son Lucinda Millan and daughter Sander Schneider. She is . • Emotional support provided  • Palliative LSW following     4. Follow-up  • Palliative care will continue to follow and goals of care conversations will be ongoing. Please reach out with any questions or concerns. 24 Hour History  Chart reviewed before visit. Per chart review, no acute events overnight. She has used 15 mg oxycodone in past 24 hrs. Primary team managing symptoms at this time. Upon my encounter today, patient seen and examined at bedside with no family present. She appears comfortable and in no acute distress. She appears fatigued today and is falling asleep during visit. She is reported to have worked with PT this morning.  She denies any symptoms currently, but does not engage further in conversation other than nodding to give permission to call her family. Called patient's daughter Kb Sinha to schedule family meeting. Unable to reach and left voicemail message. No contact information for patient's son Jose Marx on file at this time.        Review of Systems   Unable to perform ROS: Acuity of condition       Medications    Current Facility-Administered Medications:   •  acetaminophen (TYLENOL) tablet 650 mg, 650 mg, Oral, Q6H PRN, Susu Rodriguez MD, 650 mg at 09/28/23 1329  •  acyclovir (ZOVIRAX) capsule 400 mg, 400 mg, Oral, BID, Trent Spencer MD, 400 mg at 10/02/23 0813  •  allopurinol (ZYLOPRIM) tablet 300 mg, 300 mg, Oral, Daily, Trent Spencer MD, 300 mg at 10/02/23 3678  •  alteplase (CATHFLO) injection 2 mg, 2 mg, Intracatheter, Q1MIN PRN, Susu Rodriguez MD  •  amLODIPine (NORVASC) tablet 10 mg, 10 mg, Oral, Daily, Trent Spencer MD, 10 mg at 10/02/23 0810  •  busPIRone (BUSPAR) tablet 30 mg, 30 mg, Oral, TID, Torey Spencer MD, 30 mg at 10/02/23 0813  •  carvedilol (COREG) tablet 12.5 mg, 12.5 mg, Oral, BID With Meals, Lina Ortiz MD, 12.5 mg at 10/02/23 0810  •  chlorhexidine (PERIDEX) 0.12 % oral rinse 15 mL, 15 mL, Mouth/Throat, Q12H [address], Trent Spencer MD, 15 mL at 10/02/23 0811  •  enoxaparin (LOVENOX) subcutaneous injection 40 mg, 40 mg, Subcutaneous, Q24H [address], Trent Spencer MD, 40 mg at 10/02/23 3444  •  famotidine (PEPCID) tablet 20 mg, 20 mg, Oral, BID, Trent Spencer MD, 20 mg at 10/02/23 4203  •  fluconazole (DIFLUCAN) tablet 400 mg, 400 mg, Oral, Daily, Torey Spencer MD, 400 mg at 10/02/23 4326  •  gabapentin (NEURONTIN) capsule 300 mg, 300 mg, Oral, TID, Torey Spencer MD, 300 mg at 10/02/23 0810  •  guaiFENesin (ROBITUSSIN) oral liquid 200 mg, 200 mg, Oral, Q6H, Trent Spencer MD, 200 mg at 10/02/23 0811  •  hydrALAZINE (APRESOLINE) injection 10 mg, 10 mg, Intravenous, Q4H PRN, Pao Spencer MD, 10 mg at 09/26/23 1442  •  HYDROmorphone HCl (DILAUDID) injection 0.2 mg, 0.2 mg, Intravenous, Q6H PRN, Maximiliano Levy MD  •  levalbuterol (Ketan Graver) inhalation solution 1.25 mg, 1.25 mg, Nebulization, Q6H, 1.25 mg at 10/02/23 0719 **AND** ipratropium (ATROVENT) 0.02 % inhalation solution 0.5 mg, 0.5 mg, Nebulization, Q6H, Trent Spencer MD, 0.5 mg at 10/02/23 0718  •  levalbuterol (XOPENEX) inhalation solution 1.25 mg, 1.25 mg, Nebulization, Q8H PRN, Pao Spencer MD, 1.25 mg at 09/22/23 2318  •  levofloxacin (LEVAQUIN) tablet 500 mg, 500 mg, Oral, Q24H [facility], Trent Spencer MD, 500 mg at 10/02/23 0809  •  lidocaine 1% buffered, , Infiltration, PRN, Alondra Zeng MD, 10 mL at 09/27/23 1426  •  lisinopril (ZESTRIL) tablet 40 mg, 40 mg, Oral, Daily, Stephen Duran MD, 40 mg at 10/02/23 0811  •  nicotine (NICODERM CQ) 21 mg/24 hr TD 24 hr patch 21 mg, 21 mg, Transdermal, Daily, Trent Spencer MD, 21 mg at 10/02/23 6420  •  nystatin (MYCOSTATIN) oral suspension 500,000 Units, 500,000 Units, Swish & Swallow, 4x Daily, eJr Leija MD, 500,000 Units at 10/02/23 0811  •  ondansetron (ZOFRAN) injection 4 mg, 4 mg, Intravenous, Q8H PRN, Jer Leija MD  •  oxyCODONE (ROXICODONE) oral solution 2.5 mg, 2.5 mg, Oral, Q4H PRN, 2.5 mg at 10/01/23 0423 **OR** oxyCODONE (ROXICODONE) oral solution 5 mg, 5 mg, Oral, Q4H PRN, Pao Spencer MD, 5 mg at 09/30/23 0445  •  oxyCODONE (ROXICODONE) oral solution 5 mg, 5 mg, Oral, Q8H, Trent Spencer MD, 5 mg at 10/02/23 0537  •  [COMPLETED] polyethylene glycol (MIRALAX) packet 17 g, 17 g, Oral, Once, 17 g at 09/16/23 1053 **FOLLOWED BY** polyethylene glycol (MIRALAX) packet 17 g, 17 g, Oral, Daily PRN, Jer Leija MD, 17 g at 09/24/23 1121  •  QUEtiapine (SEROquel) tablet 50 mg, 50 mg, Oral, HS, RANDEE Ellis, 50 mg at 10/01/23 2225  •  senna oral syrup 8.8 mg, 8.8 mg, Per NG Tube, HS, Trent Spencer MD, 8.8 mg at 09/30/23 0228  •  sertraline (ZOLOFT) tablet 75 mg, 75 mg, Per PEG Tube, Daily, RANDEE lElis, 75 mg at 10/02/23 0810  •  sodium chloride 0.9 % inhalation solution **ADS Override Pull**, , , ,   •  sodium chloride 3 % inhalation solution 4 mL, 4 mL, Nebulization, Q6H, Trent Spencer MD, 4 mL at 10/02/23 0719  •  sulfamethoxazole-trimethoprim (BACTRIM DS) 800-160 mg per tablet 1 tablet, 1 tablet, Oral, Once per day on Mon Wed Fri, Trent Spencer MD, 1 tablet at 10/02/23 0811    Objective  /60   Pulse 102   Temp (!) 97.4 °F (36.3 °C) (Axillary)   Resp 17   Ht 5' 1" (1.549 m)   Wt 75.2 kg (165 lb 12.6 oz)   SpO2 95%   BMI 31.32 kg/m²   Physical Exam:   Constitutional: Comfortable and in no acute distress. Head: Normocephalic and atraumatic. Eyes: EOM are normal. No ocular discharge. No scleral icterus. Neck: no visible adenopathy or masses  Cardiac: Normal rate   Respiratory: No stridor. No respiratory distress. No cough. Trach in place  Gastrointestinal: No abdominal distension. Musculoskeletal: No abnormal movements  Neurological: Drowsy. Awakens easily to voice, minimally conversant  Skin: Dry, no diaphoresis. Psychiatric: Calm with no signs of agitation    Lab Results:   I have personally reviewed pertinent labs. , CBC:   Lab Results   Component Value Date    WBC 0.21 (LL) 10/02/2023    HGB 8.3 (L) 10/02/2023    HCT 26.8 (L) 10/02/2023    MCV 98 10/02/2023    PLT 68 (L) 10/02/2023    RBC 2.75 (L) 10/02/2023    MCH 30.2 10/02/2023    MCHC 31.0 (L) 10/02/2023    RDW 15.4 (H) 10/02/2023    MPV 10.1 10/02/2023   , CMP:   Lab Results   Component Value Date    SODIUM 135 10/02/2023    K 4.1 10/02/2023    CL 97 10/02/2023    CO2 34 (H) 10/02/2023    BUN 28 (H) 10/02/2023 CREATININE 1.04 10/02/2023    CALCIUM 8.5 10/02/2023    AST 18 10/02/2023    ALT 20 10/02/2023    ALKPHOS 145 (H) 10/02/2023    EGFR 54 10/02/2023     Imaging Studies: I have personally reviewed pertinent reports. EKG, Pathology, and Other Studies: I have personally reviewed pertinent reports. Counseling / Coordination of Care  Total floor / unit time spent today 40 minutes. Greater than 50% of total time was spent with the patient and / or family counseling and / or coordinating of care. A description of the counseling / coordination of care: Chart reviewed, provided medical updates, determined goals of care, discussed palliative care and symptom management, determined competency and POA/HCA, determined social/family support, provided psychosocial support. privacy exception: note includes other individuals    Castro Enamorado PA-C  Palliative & Supportive Care    Portions of this document may have been created using dictation software and as such some "sound alike" terms may have been generated by the system. Do not hesitate to contact me with any questions or clarifications.

## 2023-10-02 NOTE — ASSESSMENT & PLAN NOTE
Cuffless trach placed 9/17, transitioned to cuffed on 10/3. Oxygen requirements not improving. For mental health patient is on buspirone, quetiapine, and sertraline. For pain, gabapentin, oxycodone scheduled and prn, and hydromorphone prn. On Guaifenesin, Atrovent and Xopenex for airway maintenance. No improvement in bilateral consolidation or atelectasis and groundglass opacities on repeat CT and chest x-rays. No change since bronchoscopy yesterday. 14/10/80% satting 94%. Patient was not receiving proper positive pressure to open up atelectatic lung. On PS 14/10/70 this morning. 14/10/80 overnight. Required Lasix 40 at midnight due to O2sat 83, and pink frothy suction. Overnight about net even.  Goal -500 mL given another Lasix 40 this AM  Patient agreeable to Slater catheter overnight 10/7  Preliminary afb stain no acid fast bacilli, bronch stain rare epis, no polys or bacteria     Plan:  • Attempt to wean O2 requirements-maintain cuff overnight from 9 pm to 6 am for positive pressure, can open cuff during the day for patient to speak  • PCP PCR, AFB culture, bronc culture, viral culture, fungal culture from bronchoscopy all pending

## 2023-10-02 NOTE — ASSESSMENT & PLAN NOTE
Home regimen: Oxycodone 5 mg twice daily prn. Tylenol 650 mg every 8 hours prn. Gabapentin 600 mg 3 times daily. Medical marijuana. Lumbar radiculopathy. Impaired ambulatory dysfunction second to pain. Patient is taking opioids regularly for pain, has bowel regimen and is having bowel movements daily. Patient has more pain today secondary to bronchoscopy yesterday, specifically in her throat and chest.    LLQ abdominal pain    Plan:  · Continue gabapentin 300 mg 3 times daily, oxycodone 5 mg 3 times daily, prn Tylenol 650 mg every 6 hours.

## 2023-10-02 NOTE — ASSESSMENT & PLAN NOTE
Impacted by chronic pain syndrome, but medication regimen may also contribute. Patient currently requires tracheostomy with ICU level care. Will require PT/ OT eval before discharge.

## 2023-10-02 NOTE — RESPIRATORY THERAPY NOTE
Pt desated on HFNC with trach adapter, sx twice and the lavaged c 3cc NSS and returned to prior setting . See flowsheet. Recovered some mucus plug secretions.

## 2023-10-02 NOTE — ASSESSMENT & PLAN NOTE
9/14 Neck/ soft tissue CT: Large enhancing soft tissue mass identified within the left neck involving multiple spaces. Centered within the left oropharynx with extensions as described above into multiple spaces. This results in significant airway compromise. 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 Small level 3 and level 4 nodes are seen within the neck. Tracheostomy by ENT, bx & addition testing performed. Replaced on 9/26. H/o tobacco abuse, daily smoker.  On nicotine while inpatient, confirmed with patient she needs nicotine patch    Next R-EPOCH scheduled 10/10 (Cycle 2)    Plan:  · ENT and heme onc following  · See acute respiratory failure and large B cell lymphoma above

## 2023-10-02 NOTE — SOCIAL WORK
Palliative LSW saw patient at the bedside today. LSW appreciates the opportunity to provide patient/family with inpatient emotional support and guidance while patient continues to receive medical attention from the medical team.     Topics discussed: [facility] team met with pt at bedside for continued support. Pt more lethargic during conversation today. She denies having any pain, but admits to feeling more tired and did appear to be falling asleep on and off during conversation. Pt was seen by PT earlier this morning. She only participated minimally in conversation, however did give permission for [facility] team to reach out to dtr for further conversation. [facility] team attempted to reach pt's dtr Linnette to discuss coordinating family meeting for pt, however was unable to reach and message left requesting c/b. Will attempt to coordinate family meeting with dtr Lico Whitney and son Toi Collier. Areas that need follow-up: Family meeting; Emotional Support  Resources given: None  Others present: [facility] team      I have spent 30 minutes with Patient today in which greater than 50% of this time was spent in counseling/coordination of care regarding Counseling / Coordination of care.        LSW will continue to follow as requested by the medical team, patient, or family

## 2023-10-03 ENCOUNTER — TELEPHONE (OUTPATIENT)
Dept: HEMATOLOGY ONCOLOGY | Facility: CLINIC | Age: 68
End: 2023-10-03

## 2023-10-03 LAB
ANION GAP SERPL CALCULATED.3IONS-SCNC: 6 MMOL/L
BACTERIA SPT RESP CULT: NO GROWTH
BASOPHILS # BLD MANUAL: 0 THOUSAND/UL (ref 0–0.1)
BASOPHILS NFR MAR MANUAL: 0 % (ref 0–1)
BUN SERPL-MCNC: 37 MG/DL (ref 5–25)
CALCIUM SERPL-MCNC: 8.9 MG/DL (ref 8.4–10.2)
CHLORIDE SERPL-SCNC: 95 MMOL/L (ref 96–108)
CO2 SERPL-SCNC: 35 MMOL/L (ref 21–32)
CREAT SERPL-MCNC: 1.56 MG/DL (ref 0.6–1.3)
EOSINOPHIL # BLD MANUAL: 0 THOUSAND/UL (ref 0–0.4)
EOSINOPHIL NFR BLD MANUAL: 0 % (ref 0–6)
ERYTHROCYTE [DISTWIDTH] IN BLOOD BY AUTOMATED COUNT: 15.6 % (ref 11.6–15.1)
GFR SERPL CREATININE-BSD FRML MDRD: 33 ML/MIN/1.73SQ M
GLUCOSE SERPL-MCNC: 131 MG/DL (ref 65–140)
GRAM STN SPEC: NORMAL
GRAM STN SPEC: NORMAL
HCT VFR BLD AUTO: 26.8 % (ref 34.8–46.1)
HGB BLD-MCNC: 8.5 G/DL (ref 11.5–15.4)
LYMPHOCYTES # BLD AUTO: 0.18 THOUSAND/UL (ref 0.6–4.47)
LYMPHOCYTES # BLD AUTO: 92 % (ref 14–44)
MAGNESIUM SERPL-MCNC: 2 MG/DL (ref 1.9–2.7)
MCH RBC QN AUTO: 31.1 PG (ref 26.8–34.3)
MCHC RBC AUTO-ENTMCNC: 31.7 G/DL (ref 31.4–37.4)
MCV RBC AUTO: 98 FL (ref 82–98)
MICROCYTES BLD QL AUTO: PRESENT
MONOCYTES # BLD AUTO: 0 THOUSAND/UL (ref 0–1.22)
MONOCYTES NFR BLD: 0 % (ref 4–12)
MYCOBACTERIUM SPEC CULT: NORMAL
NEUTROPHILS # BLD MANUAL: 0.02 THOUSAND/UL (ref 1.85–7.62)
NEUTS SEG NFR BLD AUTO: 8 % (ref 43–75)
PLATELET # BLD AUTO: 61 THOUSANDS/UL (ref 149–390)
PLATELET BLD QL SMEAR: ABNORMAL
PMV BLD AUTO: 10.4 FL (ref 8.9–12.7)
POTASSIUM SERPL-SCNC: 4.4 MMOL/L (ref 3.5–5.3)
RBC # BLD AUTO: 2.73 MILLION/UL (ref 3.81–5.12)
RBC MORPH BLD: NORMAL
RHODAMINE-AURAMINE STN SPEC: NORMAL
SODIUM SERPL-SCNC: 136 MMOL/L (ref 135–147)
WBC # BLD AUTO: 0.2 THOUSAND/UL (ref 4.31–10.16)

## 2023-10-03 PROCEDURE — 94760 N-INVAS EAR/PLS OXIMETRY 1: CPT

## 2023-10-03 PROCEDURE — 94669 MECHANICAL CHEST WALL OSCILL: CPT

## 2023-10-03 PROCEDURE — 99232 SBSQ HOSP IP/OBS MODERATE 35: CPT | Performed by: NURSE PRACTITIONER

## 2023-10-03 PROCEDURE — 85007 BL SMEAR W/DIFF WBC COUNT: CPT

## 2023-10-03 PROCEDURE — 0B21XFZ CHANGE TRACHEOSTOMY DEVICE IN TRACHEA, EXTERNAL APPROACH: ICD-10-PCS | Performed by: INTERNAL MEDICINE

## 2023-10-03 PROCEDURE — NC001 PR NO CHARGE: Performed by: INTERNAL MEDICINE

## 2023-10-03 PROCEDURE — 83735 ASSAY OF MAGNESIUM: CPT

## 2023-10-03 PROCEDURE — 94003 VENT MGMT INPAT SUBQ DAY: CPT

## 2023-10-03 PROCEDURE — 99291 CRITICAL CARE FIRST HOUR: CPT | Performed by: INTERNAL MEDICINE

## 2023-10-03 PROCEDURE — 94640 AIRWAY INHALATION TREATMENT: CPT

## 2023-10-03 PROCEDURE — 80048 BASIC METABOLIC PNL TOTAL CA: CPT

## 2023-10-03 PROCEDURE — 85027 COMPLETE CBC AUTOMATED: CPT

## 2023-10-03 RX ADMIN — SODIUM CHLORIDE SOLN NEBU 3% 4 ML: 3 NEBU SOLN at 19:18

## 2023-10-03 RX ADMIN — BUSPIRONE HYDROCHLORIDE 30 MG: 10 TABLET ORAL at 16:49

## 2023-10-03 RX ADMIN — BUSPIRONE HYDROCHLORIDE 30 MG: 10 TABLET ORAL at 21:15

## 2023-10-03 RX ADMIN — GABAPENTIN 300 MG: 300 CAPSULE ORAL at 21:14

## 2023-10-03 RX ADMIN — GUAIFENESIN 200 MG: 200 SOLUTION ORAL at 21:16

## 2023-10-03 RX ADMIN — ALLOPURINOL 300 MG: 100 TABLET ORAL at 08:33

## 2023-10-03 RX ADMIN — QUETIAPINE FUMARATE 50 MG: 25 TABLET ORAL at 21:15

## 2023-10-03 RX ADMIN — IPRATROPIUM BROMIDE 0.5 MG: 0.5 SOLUTION RESPIRATORY (INHALATION) at 19:18

## 2023-10-03 RX ADMIN — Medication 8.8 MG: at 21:16

## 2023-10-03 RX ADMIN — NYSTATIN 500000 UNITS: 100000 SUSPENSION ORAL at 17:15

## 2023-10-03 RX ADMIN — SODIUM CHLORIDE SOLN NEBU 3% 4 ML: 3 NEBU SOLN at 07:06

## 2023-10-03 RX ADMIN — ACYCLOVIR 400 MG: 200 CAPSULE ORAL at 08:47

## 2023-10-03 RX ADMIN — LEVALBUTEROL HYDROCHLORIDE 1.25 MG: 1.25 SOLUTION RESPIRATORY (INHALATION) at 13:42

## 2023-10-03 RX ADMIN — NICOTINE 21 MG: 21 PATCH, EXTENDED RELEASE TRANSDERMAL at 08:37

## 2023-10-03 RX ADMIN — LEVALBUTEROL HYDROCHLORIDE 1.25 MG: 1.25 SOLUTION RESPIRATORY (INHALATION) at 19:17

## 2023-10-03 RX ADMIN — LEVALBUTEROL HYDROCHLORIDE 1.25 MG: 1.25 SOLUTION RESPIRATORY (INHALATION) at 01:17

## 2023-10-03 RX ADMIN — IPRATROPIUM BROMIDE 0.5 MG: 0.5 SOLUTION RESPIRATORY (INHALATION) at 13:42

## 2023-10-03 RX ADMIN — OXYCODONE HYDROCHLORIDE 5 MG: 5 SOLUTION ORAL at 06:26

## 2023-10-03 RX ADMIN — GABAPENTIN 300 MG: 300 CAPSULE ORAL at 16:50

## 2023-10-03 RX ADMIN — BUMETANIDE 2 MG: 0.25 INJECTION INTRAMUSCULAR; INTRAVENOUS at 00:05

## 2023-10-03 RX ADMIN — CARVEDILOL 12.5 MG: 12.5 TABLET, FILM COATED ORAL at 08:28

## 2023-10-03 RX ADMIN — SERTRALINE 75 MG: 25 TABLET, FILM COATED ORAL at 08:34

## 2023-10-03 RX ADMIN — FLUCONAZOLE 400 MG: 100 TABLET ORAL at 08:36

## 2023-10-03 RX ADMIN — OXYCODONE HYDROCHLORIDE 5 MG: 5 SOLUTION ORAL at 06:22

## 2023-10-03 RX ADMIN — LEVOFLOXACIN 500 MG: 250 TABLET, FILM COATED ORAL at 08:36

## 2023-10-03 RX ADMIN — ACYCLOVIR 400 MG: 200 CAPSULE ORAL at 17:15

## 2023-10-03 RX ADMIN — AMLODIPINE BESYLATE 10 MG: 5 TABLET ORAL at 08:35

## 2023-10-03 RX ADMIN — GUAIFENESIN 200 MG: 200 SOLUTION ORAL at 14:08

## 2023-10-03 RX ADMIN — IPRATROPIUM BROMIDE 0.5 MG: 0.5 SOLUTION RESPIRATORY (INHALATION) at 01:17

## 2023-10-03 RX ADMIN — ENOXAPARIN SODIUM 40 MG: 40 INJECTION SUBCUTANEOUS at 08:37

## 2023-10-03 RX ADMIN — GUAIFENESIN 200 MG: 200 SOLUTION ORAL at 02:40

## 2023-10-03 RX ADMIN — FILGRASTIM-AAFI 300 MCG: 300 INJECTION, SOLUTION SUBCUTANEOUS at 08:51

## 2023-10-03 RX ADMIN — FAMOTIDINE 20 MG: 20 TABLET, FILM COATED ORAL at 08:36

## 2023-10-03 RX ADMIN — OXYCODONE HYDROCHLORIDE 5 MG: 5 SOLUTION ORAL at 21:15

## 2023-10-03 RX ADMIN — BUSPIRONE HYDROCHLORIDE 30 MG: 10 TABLET ORAL at 11:05

## 2023-10-03 RX ADMIN — SODIUM CHLORIDE SOLN NEBU 3% 4 ML: 3 NEBU SOLN at 01:17

## 2023-10-03 RX ADMIN — CHLORHEXIDINE GLUCONATE 15 ML: 1.2 SOLUTION ORAL at 08:37

## 2023-10-03 RX ADMIN — CHLORHEXIDINE GLUCONATE 15 ML: 1.2 SOLUTION ORAL at 21:14

## 2023-10-03 RX ADMIN — NYSTATIN 500000 UNITS: 100000 SUSPENSION ORAL at 11:06

## 2023-10-03 RX ADMIN — LEVALBUTEROL HYDROCHLORIDE 1.25 MG: 1.25 SOLUTION RESPIRATORY (INHALATION) at 07:05

## 2023-10-03 RX ADMIN — NYSTATIN 500000 UNITS: 100000 SUSPENSION ORAL at 08:33

## 2023-10-03 RX ADMIN — OXYCODONE HYDROCHLORIDE 5 MG: 5 SOLUTION ORAL at 14:08

## 2023-10-03 RX ADMIN — GUAIFENESIN 200 MG: 200 SOLUTION ORAL at 08:28

## 2023-10-03 RX ADMIN — IPRATROPIUM BROMIDE 0.5 MG: 0.5 SOLUTION RESPIRATORY (INHALATION) at 07:05

## 2023-10-03 RX ADMIN — GABAPENTIN 300 MG: 300 CAPSULE ORAL at 08:33

## 2023-10-03 RX ADMIN — NYSTATIN 500000 UNITS: 100000 SUSPENSION ORAL at 21:14

## 2023-10-03 RX ADMIN — FAMOTIDINE 20 MG: 20 TABLET, FILM COATED ORAL at 17:15

## 2023-10-03 NOTE — CASE MANAGEMENT
Case Management Progress Note    Patient name Leeanna Powell  Location ICU 03/ICU 03 MRN [id number]  : 1955 Date 10/3/2023       LOS (days): 20  Geometric Mean LOS (GMLOS) (days): 24.20  Days to GMLOS:4.2        OBJECTIVE:        Current admission status: Inpatient  Preferred Pharmacy:   [facility] #[id number]- [place] - [address]. [address] [place] [address]  Phone: [phone] Fax: [phone]) [place] [address] [place] [address]  Phone: [phone] Fax: [phone]    Primary Care Provider: Ken Dover MD    Primary Insurance: Beverlyn Closs REP  Secondary Insurance: [address] [facility]    PROGRESS NOTE:    CM spoke with Oncology [facility]. Plan is for pt to f/u with Dr. Otilia Sexton OP and would need to call [facility] to schedule the OP appointment. Pt was scheduled for today, but missed due to being IP. As per Sera Lea once pt sees Dr. Otilia Sexton and OP treatment plan will be established. Normally with the Chemo pt is receiving it is 1 dose every 15 days or so however, pt would need to be admitted during the chemo and would also obtain imaging at that time.

## 2023-10-03 NOTE — PROCEDURES
Tracheostomy Tube exchange    · Asked by Dr. Tong Boston for trach exchange to cuffed trach to improve positive pressure ventilation  · Patient on 100% FiO2, uncuffed Shiley XLT 7.0 removed and replaced easily with Shiley Cuffed 7.0 ETT, noted persistent inferior stoma skin breakdown w/o purulence  · BS equal bilaterally after procedure, good Vt return on ventilator, SpO2 99%  · Tolerated procedure well w/o complications    Clovia Odell, DO

## 2023-10-03 NOTE — PROGRESS NOTES
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 CT AP with no lymphadenopathy; Brain MRI no lesion; stage II Large B Cell Lymphoma. She started her dose-adjusted systemic treatment, R-EPOCH, on 9/22/23 with the last dose on 9/25/23. She received Rituxan infusion on 9/27/23. Her CMP and uric acid have been unremarkable for tumor lysis. Overall, patient tolerated chemotherapy and immunotherapy very well. She is also s/p IR gastrostomy tube placement on 9/27/23. Started  growth factor on 9/28/23. WBC decreased at 0.21 with absolute neutrophil count at 0.00 on 10/2/23. Commence with neutropenic precautions. Patient afebrile, no complaints of bone pain from growth factor injections since 9/28/23. Plan to continue with with filgrastim daily until Nafisa improves. Assessment and Plan  1. 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 Left Jugular Lymphadenopathy     3. Neutropenia   -WBC 0.20 < 0.21.        ANC  0.02 < 0.00  -Neutropenic precautions  -S/p chemotherapy on 9/25/23, immunotherapy on 9/27/23  -Started with growth factor, Neupogen, on 9/28/23, plan to continue until Monicasmargaret improves    Started systemic treatment: EPOCH-R Cycle 1 day 1-3: Etoposide, Doxorubicin, Vincristine , Cyclophosphamide , prednisone + Rituximab (9/22/23-9/26/23)    CMP  Uric acid 2.3 (10/1/23) < 2.5 (9/28) < 2.2 (9/26) < 2.4 < 2.7 < 2.8   WNL  Potassium 4.1 < 3.2 < 3.7 <3.4 < 3.6 < 3.4   WNL  Calcium 9.7 < 10.5 < 8.38 < 8.8   Total Bili 0.62 < 0.42 < 0.38 <0.30   WNL  Phosphorus 3.4 < 2.2 < 2.1 < 2.5  WNL  Creatinine 1.04 < 0.78 < 0.68   WNL  AST/ALT  18/20 < 14/23 <14/19   WNL    CBC  Hemoglobin 8.5 < 8.3 (10/2) < 9.4 (9/30) <11.4 <11.7 (9/27) < 11.2 < 10.6 < 10.9  WBC 0.20 < 0.21 (10/2) < 0.20 < 8.01 < 8.67 ()11.23 < 13.95 < 9.72  Platelets 61 < 68 (92/6) < 158 < 219 () < 204 < 221 < 181    Imagin23 CTA Chest Rt middle lobe consolidation (PNA), slight regression of previous hilar and mediastinal LNs (had recent PNA in 2023)      23 CT Soft Tissue Neck w contrast: soft tissue mass ~ 6.1 x 4.7 x 5.2 cm appears to be centered within Lt oropharynx involving multiple spaces and extends into the nasopharynx. .. multiple small jugular chain nodes on the left.      23 nasopharynx mass biopsy initial results positive for malignancy favor poorly differentiated carcinoma. Cannot exclude lymphoma. Flow cytometry pending.      PLAN:  1) Patient is s/p trach exchage to cuffed trach per pulmonology. SpO2 at 99%. 2) Continues with neutropenic precautions. Of note, WBC at 0.20., ANC at 0.02. 3) Continue with growth factor 300 mcg daily until Nicholasberg improves     S/p Day 3/3 for Cycle 1 EPOCH (etoposidevincristine, cyclophosphamide, doxorubicin) on 23, and rituxamab on 23. Started with growth factor on 23. Filgrastim (Neupogen) ordered at 5 mg/kg (375 mg) rounded to 300 mcg; will continue to watch CBC/ANC daily. 4) Continue with prophylaxis medications in the setting of neutropenia: allopurinol 300 mg daily, acyclovir 400 mg twice daily, fluconazole 400 mg and levofloxacin 500 mg daily     5) Recommend repeat CT imaging to assess for treatment response    6) Outpatient follow up plan: will need to reschedule with Dr. Olga Madden. Patient is anticipated to have second cycle of chemo in the inpatient setting [facility] treatment) on 10/14/23? Will discuss with case management regarding timing of outpatient rehab and next cycle of chemotherapy. Subjective:    Review of Systems   Constitutional: Positive for fatigue. Negative for chills, diaphoresis and fever. HENT: Negative for ear pain and sore throat.          Neck mass Eyes: Negative for pain and visual disturbance. Respiratory: Negative for cough, chest tightness, shortness of breath and wheezing. Cardiovascular: Negative for chest pain and palpitations. Gastrointestinal: Negative for abdominal pain, blood in stool, diarrhea, nausea and vomiting. Genitourinary: Negative for difficulty urinating, dysuria and hematuria. Musculoskeletal: Negative for arthralgias and back pain. Skin: Negative for color change and rash. Neurological: Positive for weakness. Negative for dizziness, seizures, syncope and headaches. Psychiatric/Behavioral: Negative for agitation and decreased concentration. The patient is not nervous/anxious. All other systems reviewed and are negative.     Objective:     Medication Administration - last 24 hours from 10/02/2023 1503 to 10/03/2023 1503       Date/Time Order Dose Route Action Action by     10/03/2023 0837 EDT chlorhexidine (PERIDEX) 0.12 % oral rinse 15 mL 15 mL Mouth/Throat Given Lorna Tobar RN     10/02/2023 2027 EDT chlorhexidine (PERIDEX) 0.12 % oral rinse 15 mL 15 mL Mouth/Throat Given Evalina Gregory, TINO     10/03/2023 1105 EDT busPIRone (BUSPAR) tablet 30 mg 30 mg Oral Given Lorna Tobar RN     10/02/2023 2134 EDT busPIRone (BUSPAR) tablet 30 mg 30 mg Oral Given Evalina TINO Jenkins     10/02/2023 1751 EDT busPIRone (BUSPAR) tablet 30 mg 30 mg Oral Given Fabiola Zhao RN     10/03/2023 1106 EDT nystatin (MYCOSTATIN) oral suspension 500,000 Units 500,000 Units Swish & Swallow Given Lorna Tobar RN     10/03/2023 3101 EDT nystatin (MYCOSTATIN) oral suspension 500,000 Units 500,000 Units Swish & Swallow Given Lorna Tobar RN     10/02/2023 2135 EDT nystatin (MYCOSTATIN) oral suspension 500,000 Units 500,000 Units Swish & Swallow Given Evalina Gregory, TINO     10/02/2023 1714 EDT nystatin (MYCOSTATIN) oral suspension 500,000 Units 500,000 Units Swish & Swallow Given Fabiola Zhao RN     10/03/2023 1342 EDT levalbuterol Valdemar ) inhalation solution 1.25 mg 1.25 mg Nebulization Given Le Lye, RT     10/03/2023 6419 EDT levalbuterol (XOPENEX) inhalation solution 1.25 mg 1.25 mg Nebulization Given Le Lye, RT     10/03/2023 0117 EDT levalbuterol (XOPENEX) inhalation solution 1.25 mg 1.25 mg Nebulization Given [place], [place]     10/02/2023 1923 EDT levalbuterol (Gareth Duplin) inhalation solution 1.25 mg 1.25 mg Nebulization Given [place], [place]     10/03/2023 1342 EDT ipratropium (ATROVENT) 0.02 % inhalation solution 0.5 mg 0.5 mg Nebulization Given Le Lye, RT     10/03/2023 0022 EDT ipratropium (ATROVENT) 0.02 % inhalation solution 0.5 mg 0.5 mg Nebulization Given Le Lye, RT     10/03/2023 0117 EDT ipratropium (ATROVENT) 0.02 % inhalation solution 0.5 mg 0.5 mg Nebulization Given [place], [place]     10/02/2023 1923 EDT ipratropium (ATROVENT) 0.02 % inhalation solution 0.5 mg 0.5 mg Nebulization Given [place], [place]     10/03/2023 5309 EDT gabapentin (NEURONTIN) capsule 300 mg 300 mg Oral Given Effie Ho, RN     10/02/2023 2027 EDT gabapentin (NEURONTIN) capsule 300 mg 300 mg Oral Given Cristhian Corey, RN     10/02/2023 1517 EDT gabapentin (NEURONTIN) capsule 300 mg 300 mg Oral Given Jaynaetha Schwab, RN     10/03/2023 8113 EDT nicotine (NICODERM CQ) 21 mg/24 hr TD 24 hr patch 21 mg 21 mg Transdermal Medication Applied Effie Ho, TINO     10/03/2023 8715 EDT nicotine (NICODERM CQ) 21 mg/24 hr TD 24 hr patch 21 mg 21 mg Transdermal Patch Removed Effie Ho, TINO     10/03/2023 6958 EDT enoxaparin (LOVENOX) subcutaneous injection 40 mg 40 mg Subcutaneous Given Effie Ho RN     10/03/2023 6035 EDT amLODIPine (NORVASC) tablet 10 mg 10 mg Oral Given Effie Ho RN     10/02/2023 2139 EDT senna oral syrup 8.8 mg 8.8 mg Per NG Tube Given Cristhian Corey RN     10/03/2023 1408 EDT guaiFENesin (ROBITUSSIN) oral liquid 200 mg 200 mg Oral Given Effie Ho RN     10/03/2023 0087 EDT guaiFENesin (ROBITUSSIN) oral liquid 200 mg 200 mg Oral Given Mima Krzysztof, RN     10/03/2023 0240 EDT guaiFENesin (ROBITUSSIN) oral liquid 200 mg 200 mg Oral Given Val Jara, RN     10/02/2023 2027 EDT guaiFENesin (ROBITUSSIN) oral liquid 200 mg 200 mg Oral Given Val Jara, RN     10/02/2023 1518 EDT guaiFENesin (ROBITUSSIN) oral liquid 200 mg 200 mg Oral Given Isaura Harrington, RN     10/03/2023 1689 EDT oxyCODONE (ROXICODONE) oral solution 2.5 mg -- Oral See Alternative Val Jara, RN     10/03/2023 6481 EDT oxyCODONE (ROXICODONE) oral solution 5 mg 5 mg Oral Given Val Jara, RN     10/03/2023 8278 EDT famotidine (PEPCID) tablet 20 mg 20 mg Oral Given Mima Krzysztof, RN     10/02/2023 1714 EDT famotidine (PEPCID) tablet 20 mg 20 mg Oral Given Isaura Harrington, RN     10/03/2023 6851 EDT allopurinol (ZYLOPRIM) tablet 300 mg 300 mg Oral Given Mima Krzysztof, RN     10/03/2023 7112 EDT levofloxacin (LEVAQUIN) tablet 500 mg 500 mg Oral Given Mima Krzysztof, RN     10/03/2023 1011 EDT fluconazole (DIFLUCAN) tablet 400 mg 400 mg Oral Given Mima Krzysztof, RN     10/03/2023 9613 EDT acyclovir (ZOVIRAX) capsule 400 mg 400 mg Oral Given Mima Krzysztof, RN     10/02/2023 1714 EDT acyclovir (ZOVIRAX) capsule 400 mg 400 mg Oral Given Isaura Harrington, RN     10/03/2023 1408 EDT oxyCODONE (ROXICODONE) oral solution 5 mg 5 mg Oral Given Mima Krzysztof, RN     10/03/2023 1045 EDT oxyCODONE (ROXICODONE) oral solution 5 mg 5 mg Oral Given Val Jara, RN     10/02/2023 2134 EDT oxyCODONE (ROXICODONE) oral solution 5 mg 5 mg Oral Given Val Jara, RN     10/02/2023 1516 EDT oxyCODONE (ROXICODONE) oral solution 5 mg 5 mg Oral Given Isaura Harrington, RN     10/03/2023 1343 EDT sodium chloride 3 % inhalation solution 4 mL 4 mL Nebulization Not Given Kelsey Hassan, RT     10/03/2023 3697 EDT sodium chloride 3 % inhalation solution 4 mL 4 mL Nebulization Given Rochelle Daniels, RT     10/03/2023 0117 EDT sodium chloride 3 % inhalation solution 4 mL 4 mL Nebulization Given Laura Avendaño, [place]     10/02/2023 1923 EDT sodium chloride 3 % inhalation solution 4 mL 4 mL Nebulization Given Michelle, [place]     10/03/2023 8730 EDT carvedilol (COREG) tablet 12.5 mg 12.5 mg Oral Given Lilia Gómez RN     10/02/2023 1715 EDT carvedilol (COREG) tablet 12.5 mg 12.5 mg Oral Given Helio Damon RN     10/03/2023 9762 EDT sertraline (ZOLOFT) tablet 75 mg 75 mg Per PEG Tube Given Lilia Gómez RN     10/02/2023 2134 EDT QUEtiapine (SEROquel) tablet 50 mg 50 mg Oral Given Bridger Schmid RN     10/03/2023 7819 EDT Filgrastim-aafi (NIVESTYM) subcutaneous injection 300 mcg 300 mcg Subcutaneous Given Lilia Gómez RN     10/02/2023 1714 EDT bumetanide (BUMEX) injection 2 mg 2 mg Intravenous Given Helio Damon RN     10/03/2023 0005 EDT bumetanide (BUMEX) injection 2 mg 2 mg Intravenous Given Bridger Schmid RN          /66   Pulse 90   Temp 100.1 °F (37.8 °C) (Oral)   Resp 19   Ht 5' 1" (1.549 m)   Wt 78.6 kg (173 lb 4.5 oz)   SpO2 99%   BMI 32.74 kg/m²       Physical Exam  Constitutional:       Appearance: She is not ill-appearing. HENT:      Head: Normocephalic and atraumatic. Comments: Trach cuff in place     Right Ear: External ear normal.      Left Ear: External ear normal.      Nose: No congestion or rhinorrhea. Mouth/Throat:      Mouth: Mucous membranes are moist.   Eyes:      Extraocular Movements: Extraocular movements intact. Conjunctiva/sclera: Conjunctivae normal.      Pupils: Pupils are equal, round, and reactive to light. Neck:      Comments: L neck mass  Cardiovascular:      Rate and Rhythm: Normal rate and regular rhythm. Pulses: Normal pulses. Heart sounds: Normal heart sounds. No murmur heard. No gallop. Pulmonary:      Effort: Pulmonary effort is normal. No respiratory distress. Breath sounds: No wheezing, rhonchi or rales. Abdominal:      General: Bowel sounds are normal. There is no distension. Palpations: There is no mass. Tenderness: There is no abdominal tenderness. There is no guarding. Comments: Peg tube in place, on tube feeding   Musculoskeletal:      Right lower leg: No edema. Left lower leg: No edema. Skin:     General: Skin is warm. Capillary Refill: Capillary refill takes less than 2 seconds. Coloration: Skin is not jaundiced or pale. Findings: Rash present. Neurological:      General: No focal deficit present. Mental Status: She is alert and oriented to person, place, and time. Psychiatric:         Mood and Affect: Mood normal.         Behavior: Behavior normal.         Thought Content:  Thought content normal.         Judgment: Judgment normal.         Recent Results (from the past 48 hour(s))   Comprehensive metabolic panel    Collection Time: 10/02/23  5:27 AM   Result Value Ref Range    Sodium 135 135 - 147 mmol/L    Potassium 4.1 3.5 - 5.3 mmol/L    Chloride 97 96 - 108 mmol/L    CO2 34 (H) 21 - 32 mmol/L    ANION GAP 4 mmol/L    BUN 28 (H) 5 - 25 mg/dL    Creatinine 1.04 0.60 - 1.30 mg/dL    Glucose 144 (H) 65 - 140 mg/dL    Calcium 8.5 8.4 - 10.2 mg/dL    Corrected Calcium 9.7 8.3 - 10.1 mg/dL    AST 18 13 - 39 U/L    ALT 20 7 - 52 U/L    Alkaline Phosphatase 145 (H) 34 - 104 U/L    Total Protein 5.3 (L) 6.4 - 8.4 g/dL    Albumin 2.5 (L) 3.5 - 5.0 g/dL    Total Bilirubin 0.62 0.20 - 1.00 mg/dL    eGFR 54 ml/min/1.73sq m   Magnesium    Collection Time: 10/02/23  5:27 AM   Result Value Ref Range    Magnesium 2.1 1.9 - 2.7 mg/dL   Phosphorus    Collection Time: 10/02/23  5:27 AM   Result Value Ref Range    Phosphorus 2.8 2.3 - 4.1 mg/dL   CBC and differential    Collection Time: 10/02/23  5:27 AM   Result Value Ref Range    WBC 0.21 (LL) 4.31 - 10.16 Thousand/uL    RBC 2.75 (L) 3.81 - 5.12 Million/uL    Hemoglobin 8.3 (L) 11.5 - 15.4 g/dL    Hematocrit 26.8 (L) 34.8 - 46.1 %    MCV 98 82 - 98 fL    MCH 30.2 26.8 - 34.3 pg    MCHC 31.0 (L) 31.4 - 37.4 g/dL    RDW 15.4 (H) 11.6 - 15.1 %    MPV 10.1 8.9 - 12.7 fL    Platelets 68 (L) 258 - 390 Thousands/uL   Calcium, ionized    Collection Time: 10/02/23  5:27 AM   Result Value Ref Range    Calcium, Ionized 1.08 (L) 1.12 - 1.32 mmol/L   Manual Differential(PHLEBS Do Not Order)    Collection Time: 10/02/23  5:27 AM   Result Value Ref Range    Segmented % 0 (L) 43 - 75 %    Lymphocytes % 83 (H) 14 - 44 %    Monocytes % 13 (H) 4 - 12 %    Eosinophils, % 2 0 - 6 %    Basophils % 0 0 - 1 %    Atypical Lymphocytes % 2 (H) <=0 %    Absolute Neutrophils 0.00 (L) 1.85 - 7.62 Thousand/uL    Lymphocytes Absolute 0.18 (L) 0.60 - 4.47 Thousand/uL    Monocytes Absolute 0.03 0.00 - 1.22 Thousand/uL    Eosinophils Absolute 0.00 0.00 - 0.40 Thousand/uL    Basophils Absolute 0.00 0.00 - 0.10 Thousand/uL    Total Counted      RBC Morphology Normal     Platelet Estimate Decreased (A) Adequate   Basic metabolic panel    Collection Time: 10/03/23  4:40 AM   Result Value Ref Range    Sodium 136 135 - 147 mmol/L    Potassium 4.4 3.5 - 5.3 mmol/L    Chloride 95 (L) 96 - 108 mmol/L    CO2 35 (H) 21 - 32 mmol/L    ANION GAP 6 mmol/L    BUN 37 (H) 5 - 25 mg/dL    Creatinine 1.56 (H) 0.60 - 1.30 mg/dL    Glucose 131 65 - 140 mg/dL    Calcium 8.9 8.4 - 10.2 mg/dL    eGFR 33 ml/min/1.73sq m   CBC and differential    Collection Time: 10/03/23  4:40 AM   Result Value Ref Range    WBC 0.20 (LL) 4.31 - 10.16 Thousand/uL    RBC 2.73 (L) 3.81 - 5.12 Million/uL    Hemoglobin 8.5 (L) 11.5 - 15.4 g/dL    Hematocrit 26.8 (L) 34.8 - 46.1 %    MCV 98 82 - 98 fL    MCH 31.1 26.8 - 34.3 pg    MCHC 31.7 31.4 - 37.4 g/dL    RDW 15.6 (H) 11.6 - 15.1 %    MPV 10.4 8.9 - 12.7 fL    Platelets 61 (L) 336 - 390 Thousands/uL   Magnesium    Collection Time: 10/03/23  4:40 AM   Result Value Ref Range    Magnesium 2.0 1.9 - 2.7 mg/dL   Manual Differential(PHLEBS Do Not Order)    Collection Time: 10/03/23  4:40 AM   Result Value Ref Range    Segmented % 8 (L) 43 - 75 % Lymphocytes % 92 (H) 14 - 44 %    Monocytes % 0 (L) 4 - 12 %    Eosinophils, % 0 0 - 6 %    Basophils % 0 0 - 1 %    Absolute Neutrophils 0.02 (L) 1.85 - 7.62 Thousand/uL    Lymphocytes Absolute 0.18 (L) 0.60 - 4.47 Thousand/uL    Monocytes Absolute 0.00 0.00 - 1.22 Thousand/uL    Eosinophils Absolute 0.00 0.00 - 0.40 Thousand/uL    Basophils Absolute 0.00 0.00 - 0.10 Thousand/uL    Total Counted      RBC Morphology Normal     Platelet Estimate Decreased (A) Adequate    Microcytes Present        US right upper quadrant    Result Date: 9/22/2023  Narrative: RIGHT UPPER QUADRANT ULTRASOUND INDICATION:     CT finding N/V +Cancino. COMPARISON: CT abdomen/pelvis 9/21/2023 TECHNIQUE:   Real-time ultrasound of the right upper quadrant was performed with a curvilinear transducer with both volumetric sweeps and still imaging techniques. FINDINGS: Evaluation limited as per sonographer's note in PACS. PANCREAS:  Visualized portions of the pancreas are within normal limits. AORTA AND IVC:  Visualized portions are normal for patient age. LIVER: Size:  Within normal range. The liver measures 15.3 cm in the midclavicular line. Contour:  Surface contour is smooth. Parenchyma:  Echogenicity and echotexture are within normal limits. No liver mass identified. Limited imaging of the main portal vein shows it to be patent and hepatopetal. BILIARY: The gallbladder is normal in caliber. Gallbladder wall thickness upper limits of normal. No pericholecystic fluid. There is gallbladder sludge without evidence for shadowing calculi. No sonographic Cancino sign. No intrahepatic biliary dilatation. CBD measures 4.0 mm. No choledocholithiasis. KIDNEY: Right kidney measures 11.9 x 5.2 x 6.4 cm. Volume 207.6 mL Several simple cysts, the largest of which measures 4.1 cm. 2.8 cm septated cyst in the upper pole. No hydronephrosis or perinephric collection. ASCITES:   None. Impression: No cholelithiasis.  Gallbladder sludge is present with wall thickness upper limits of normal. Negative sonographic Cancino sign. Findings may be sequela of chronic gallbladder dysfunction. Consider follow-up with a HIDA scan if it would alter patient management. Workstation performed: [id number]     CT abdomen pelvis w contrast    Result Date: 9/21/2023  Narrative: CT ABDOMEN AND PELVIS WITH IV CONTRAST INDICATION:   Head/neck cancer, staging lymohoma staging prior to treatment with chemo. COMPARISON: 9/13/2023. TECHNIQUE:  CT examination of the abdomen and pelvis was performed. Multiplanar 2D reformatted images were created from the source data. This examination, like all CT scans performed in the [facility], was performed utilizing techniques to minimize radiation dose exposure, including the use of iterative reconstruction and automated exposure control. Radiation dose length product (DLP) for this visit:  813 mGy-cm IV Contrast:  100 mL of iohexol (OMNIPAQUE) Enteric Contrast:  Enteric contrast was not administered. FINDINGS: Mildly degraded by respiratory motion. ABDOMEN LOWER CHEST: There is bibasilar atelectasis. LIVER/BILIARY TREE:  Unremarkable. GALLBLADDER:  No calcified gallstones. There may be mild gallbladder wall thickening though evaluation is degraded by respiratory motion. No surrounding inflammatory changes. SPLEEN:  Unremarkable. PANCREAS:  Unremarkable. ADRENAL GLANDS:  Unremarkable. KIDNEYS/URETERS: Multiple bilateral renal cysts. No hydronephrosis. STOMACH AND BOWEL: There is colonic diverticulosis without evidence of acute diverticulitis. APPENDIX:  No findings to suggest appendicitis. ABDOMINOPELVIC CAVITY:  No ascites. No pneumoperitoneum. No lymphadenopathy. VESSELS:  Unremarkable for patient's age. PELVIS REPRODUCTIVE ORGANS:  Unremarkable for patient's age. URINARY BLADDER:  Unremarkable. ABDOMINAL WALL/INGUINAL REGIONS:  Fat containing right inguinal hernia noted. Fat-containing umbilical hernia.  OSSEOUS STRUCTURES: Again seen is a destructive lytic lesion in the T12 vertebral body with sclerotic borders, progressed from 2013 and with chronic appearing pathologic fracture. Impression: 1. No abdominal lymphadenopathy. 2.  Question gallbladder wall thickening versus motion artifact. If there is clinical concern for gallbladder pathology, ultrasound is recommended. 3.  Chronic T12 lytic lesion with pathologic fracture. Workstation performed: [id number]     MRI soft tissue neck wo and w contrast    Result Date: 9/21/2023  Narrative: MRI OF THE SOFT TISSUES OF THE NECK - WITH AND WITHOUT CONTRAST INDICATION: Oropharyngeal mass s/p biopsy (+) for carcinoma COMPARISON: Neck CT from 9/14/2023 TECHNIQUE:  Multiplanar, multisequence imaging of the soft tissues of the neck was performed before and after gadolinium administration. . IV Contrast:  7.5 mL of Gadobutrol injection (SINGLE-DOSE) IMAGE QUALITY: Motion degrades images. FINDINGS: VISUALIZED BRAIN PARENCHYMA: No acute or suspicious findings. Please see today's brain MR report. VISUALIZED ORBITS AND PARANASAL SINUSES: Globes and orbits are within normal limits. Pan paranasal sinus mucosal thickening, greatest involving ethmoids and sphenoids. NASAL CAVITY, NASOPHARYNX, and OROHYPOPHARYNX and LARYNX: Approximately 7.5 x 4.0 x 7.8 cm (length X depth X width) soft tissue mass, epicenter likely the left lateral pharyngeal recess. Enhances and restricts diffusion. Central, irregular fluid signal intensity area without enhancement appears contiguous with fluid around an endotracheal tube, likely trapped secretions and circumferential mass. Mass extends from the left greater than right nasopharynx superiorly to the cricoid cartilage inferiorly. Extends to the posterior nasal cavity. Displaces the soft palate, uvula and tongue base anteriorly. Oral cavity is otherwise within normal limits. Displaces the left pterygoid muscles anterolaterally.  Extends posterior to the angle of the mandible approximately 1.4 cm, abutting and mildly laterally displacing the deep parotid, inseparable from the gland. Effaces the left parapharyngeal fat. Discrete epiglottis not seen, grossly anteriorly displaced. Extends along the left aryepiglottic fold and posterior pharyngeal wall to the to the inferior supraglottic airway, grossly terminating just above or at the left false cord. Extends to the left carotid space, encircles the carotid with severe narrowing and posterior-lateral displacement of the distal cervical and most proximal petrous vessel. Otherwise preserved left internal carotid flow-void. Posterior-lateral displacement  and occlusion of the internal jugular vein at the level of the angle of the mandible in the distal neck. Mass extends to the proximal jugular foramen. Laryngeal ventricles and true cords appear preserved. Normal fat signal  preserved in the cricoid and thyroid cartilages. Enteric and endotracheal tubes are displaced rightward. Trace retropharyngeal effusion. THYROID GLAND:   Within normal limits. PAROTID AND SUBMANDIBULAR GLANDS: Both submandibular and the right parotid glands are within normal limits. Deep left parotid involvement mentioned above. LYMPH NODES: Similar small jugular chain nodes more numerous on the left than the right, but none considered pathologically enlarged. 0.7 x 0.5 cm suspicious right retropharyngeal node (4/27). Left retropharyngeal space is obliterated by mass, no separate adenopathy. VASCULAR STRUCTURES: Left carotid a jugular involvement described above. Right carotid artery and jugular veins are within normal limits. THORACIC INLET: Similar to CT. Dependent lung opacities. CERVICAL SPINE: Normal appearance. OTHER: Left greater than right mastoid effusions with patchy left enhancement and restricted diffusion were described in today's brain MR report. Asymmetric opacity involves the left petrous apex. Normal appearance of the IACs and inner ear structures.  No cerebellopontine angle mass. Impression: Known, large left neck mass described in detail above. Workstation performed: [id number]     MRI brain w wo contrast    Result Date: 9/21/2023  Narrative: MRI BRAIN WITHOUT AND WITH CONTRAST INDICATION:  Oropharyngeal mass s/p biopsy (+) for carcinoma . COMPARISON: 6/17/2017 CT of the brain TECHNIQUE:  Multiplanar/multisequence MRI of the brain was performed administration of contrast. IV Contrast:  7.5 mL of Gadobutrol injection (SINGLE-DOSE) IMAGE QUALITY:  Diagnostic. FINDINGS: BRAIN PARENCHYMA: No  hemorrhage, extra-axial fluid collection, acute infarct, mass effect or midline shift. Mild, focal patchy periventricular and subcortical white matter foci of abnormal T2 and FLAIR hyperintensity are nonspecific, but usually secondary to changes of microangiopathy. Small developmental venous angioma in the left posterior frontal lobe, typically clinically insignificant. No suspicious enhancement or masses. VENTRICLES:  Within normal limits for age. SELLA AND PITUITARY GLAND:  Within normal limits. ORBITS:  Within normal limits. PARANASAL SINUSES: Mucosal thickening. VASCULATURE: Preserved skull base flow voids. Normal enhancement. CALVARIUM AND SKULL BASE: Bilateral mastoid effusions are likely secondary mass, related to eustachian tube obstruction from nasopharyngeal mass. Patient is also intubated. Small mucosal retention cyst at the right fossa of Rosenmuller. Small ill-defined foci of enhancement in the superior mastoid and asymmetric left mastoid restricted diffusion. No coalescence or discrete mass. Skull and visualized cervical spine are within normal limits. EXTRACRANIAL SOFT TISSUES:  A nasopharyngeal mass will be described in further detail on today's neck MR report. Impression: No evidence of brain metastases. 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  Asymmetric patchy enhancement and restricted diffusion in the left mastoid. The differential includes neoplastic involvement and infection. Workstation performed: [id number]     XR chest portable    Result Date: 9/20/2023  Narrative: CHEST INDICATION:   NG tube placement. COMPARISON: 9/17/2023 EXAM PERFORMED/VIEWS:  XR CHEST PORTABLE FINDINGS: Tracheostomy tube is in stable position. Distal portions of nasogastric tube are seen below the hemidiaphragm within the left upper abdomen. The tip of the tube extends beyond the inferior margin of this study. Heart shadow is enlarged but unchanged from prior exam. There is mild pulmonary vascular congestion. The left costophrenic angle is obscured. Hazy opacities are additionally noted within the right costophrenic angle. No evidence of acute osseous abnormality. Impression: 1. Nasogastric tube is seen with its distal portions within the stomach. The tip of the tube courses beyond the inferior margin of the study. 2. Pulmonary vascular congestion with obscuration of the left hemidiaphragm. This is stable from prior radiograph and may represent small left effusion versus consolidation. 3. Right basilar atelectasis versus trace right effusion. Workstation performed: [id number]     XR chest portable    Result Date: 9/18/2023  Narrative: CHEST INDICATION:   Assess. COMPARISON:  None EXAM PERFORMED/VIEWS:  XR CHEST PORTABLE Images: 2 FINDINGS: Tracheostomy tube is seen in appropriate position. A feeding tube is seen extending down below the dome of the diaphragm Cardiomegaly seen Increased lung markings seen suggest congestion left base is obscured Osseous structures appear within normal limits for patient age. Impression: Increased lung markings seen suggest congestion. The left base is obscured No worsening Workstation performed: [id number]     CT soft tissue neck w contrast    Result Date: 9/14/2023  Narrative: CT NECK WITH CONTRAST INDICATION:   Laryngeal edema COMPARISON:  None.  TECHNIQUE:  Axial, sagittal, and coronal 2D reformatted images were created from the axial source data and submitted for interpretation. Radiation dose length product (DLP) for this visit:  769 mGy-cm . This examination, like all CT scans performed in the [facility], was performed utilizing techniques to minimize radiation dose exposure, including the use of iterative reconstruction and automated exposure control. IV Contrast:  85 mL of iohexol (OMNIPAQUE) IMAGE QUALITY:  Diagnostic. FINDINGS: VISUALIZED BRAIN PARENCHYMA:  No acute intracranial pathology of the visualized brain parenchyma. VISUALIZED ORBITS: Normal visualized orbits. PARANASAL SINUSES: Normal visualized paranasal sinuses. Nasopharynx, oropharynx AND HYPOPHARYNX: There is a large heterogeneously enhancing mass identified within the neck involving multiple spaces. The origin is difficult to ascertain given the large size. This mass measures approximately 6.1 x 4.7 x 5.2 cm and appears to be centered within the left oropharynx. The mass extends superiorly into the nasopharynx left more so than right and into the posterior aspect of the nasal cavity. The mass also extends across the midline within the oropharynx and inferiorly into the left lateral oropharynx but not below the laryngeal ventricle. The mass extends laterally into the infratemporal fossa and parapharyngeal fat and is inseparable from infratemporal musculature and deep lobe of the parotid gland. There is extension into the prevertebral space where the mass is inseparable from the anterior paraspinal muscle on the left and posteriorly and laterally into the carotid space where the mass is displacing and inseparable from jugular and carotid. The mass encases the cervical internal carotid artery just below the level of the skull base resulting in narrowing of the vessel and the mass compresses and occludes the jugular vein below the skull base.  The vessel does reconstitute via collateral vessels as it  extends inferiorly within the neck. The mass extends superiorly into the skull base inseparable from the carotid canal and jugular foramen. There is severe airway compromise within the posterior oral cavity and superior oropharynx. ET tube and OG tube are present. THYROID GLAND:  Unremarkable. PAROTID AND SUBMANDIBULAR GLANDS: Normal parotid and submandibular glands other than the mass abutting the deep lobe of the left parotid gland. LYMPH NODES: Multiple small jugular chain nodes are seen on the left. VASCULAR STRUCTURES: As described above the mass partially encases the cervical internal carotid artery, narrowing the vessel and occludes the jugular vein from the skull base to the mid neck. Below this the vessel is unremarkable due to collateral reconstitution. THORACIC INLET: Posterior left upper lobe consolidation may represent atelectasis or pneumonia. Mild patchy groundglass opacity within the upper lung fields. BONY STRUCTURES: At T1-2 there is a left paramedian bridging osteophyte resulting in mild to moderate left anterior lateral canal stenosis. Impression: Large enhancing soft tissue mass identified within the left neck involving multiple spaces. This appears to be centered within the left oropharynx with extensions as described above into multiple spaces. This results in significant airway compromise. 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 Small level 3 and level 4 nodes are seen within the neck. I personally discussed this study with ICU resident on 9/14/2023 4:44 PM. Workstation performed: [id number]     Bronchoscopy    Result Date: 9/14/2023  Narrative: Table formatting from the original result was not included.  [address] [place] [address] [phone] DATE OF SERVICE: 9/14/23 PHYSICIAN(S): Attending: No Staff Documented Fellow: No Staff Documented INDICATION: Acute respiratory failure with hypoxia (HCC) POST-OP DIAGNOSIS: See the impression below. PREPROCEDURE: Standard airway preparation completed per respiratory therapy protocol. Informed consent was obtained. Images reviewed prior to the procedure. A Time Out was performed. No suspicion or identified risk for TB or other airborne infectious disease; bronchoscopy procedure being performed for diagnostic purposes. PROCEDURE: Bronchoscopy DETAILS OF PROCEDURE: Patient was taken to the procedure room where a time out was performed to confirm correct patient and correct procedure. The patient was already under sedation prior to the procedure. The patient's blood pressure, ECG, heart rate, level of consciousness, respirations and oxygen were monitored throughout the procedure. The patient experienced no blood loss. The scope was introduced through the endotracheal tube. The procedure was moderately difficult due to patient intolerance and persistent coughing. In response to procedure difficulty, deeper sedation was employed. The patient tolerated the procedure well. There were no apparent adverse events. ANESTHESIA INFORMATION: ASA: ASA status not filed in the log. Anesthesia Type: Anesthesia type not filed in the log.  FINDINGS: The lower trachea, main sujata, left main stem, KARTHIK, lingula, LLL, right main stem, RUL, bronchus intermedius, RML and RLL appeared normal. Left lower lung zone with no endobronchial lesions, left upper and lingula both appear normal Right upper, right middle and right lower lobes all appeared normal with no endobronchial lesions Endotracheal tube was retracted 3 cm under direct visualization with the bronchoscope Bronchoalveolar lavage was performed x1 in the right lateral segment (RB4) with 60 mL of saline instilled and a total return of 40 mL; the fluid appeared cloudy SPECIMENS: ID Type Source Tests Collected by Time Destination 1 :  Lavage Lung, Right Middle Lobe Bronchoalveolar Lavage PULMONARY CYTOLOGY Nate Baltazar MD 9/14/2023 12:55 PM  A : Bronchial Lung, Right Middle Lobe Bronchoalveolar Lavage FUNGAL CULTURE, VIRUS CULTURE, BRONCHIAL CULTURE AND GRAM STAIN, LEGIONELLA CULTURE, PNEUMOCYSTIS SMEAR BY DFA ([facility]), AFB CULTURE WITH STAIN Dayana Sheriff MD 9/14/2023 12:57 PM       Impression: BAL of the right middle lobe Endotracheal tube was retracted under direct visualization Tongue still appears swollen, vocal cords visualized outside of the endotracheal tube with the bronchoscope, no significant edema or mass noted RECOMMENDATION:  Follow-up infectious work-up      XR chest 1 view portable    Result Date: 9/13/2023  Narrative: CHEST INDICATION:   post intubation. COMPARISON: Same day chest radiograph and subsequent same day CT chest. Chest x-ray 8/27/2023. EXAM PERFORMED/VIEWS:  XR CHEST PORTABLE FINDINGS: Endotracheal tube terminates approximately 4 cm above the sujata. Heart shadow is enlarged but unchanged from prior exam. Bibasilar airspace opacities are again demonstrated. No appreciable pneumothorax. No evidence of acute osseous abnormality. Lucent lesion within T12 is better demonstrated on same-day CT. Impression: 1. Endotracheal tube terminates 4 cm above the sujata. 2. Redemonstration of bibasilar airspace opacities. Workstation performed: [id number]     XR chest 1 view portable    Result Date: 9/13/2023  Narrative: CHEST INDICATION:   post intubation, adjusting ET Tube. COMPARISON:  None EXAM PERFORMED/VIEWS:  XR CHEST PORTABLE FINDINGS: Endotracheal tube terminates approximately 3 cm above the sujata. Cardiomediastinal silhouette appears unremarkable. Bibasilar airspace opacities are again noted. No appreciable pneumothorax or pleural effusion. No evidence of acute osseous abnormality. Lucent lesion within T12 is better demonstrated on same-day CT. Impression: 1. Endotracheal tube terminates 3.3 cm above the sujata. 2. Bibasilar airspace opacities are again demonstrated.  Workstation performed: [id number]     XR chest portable    Result Date: 9/13/2023  Narrative: CHEST INDICATION:   sob. COMPARISON: 8/27/2023. Subsequent CT chest performed the same day. EXAM PERFORMED/VIEWS:  XR CHEST PORTABLE FINDINGS: Heart shadow is enlarged but unchanged from prior exam. Hazy opacities are noted within the right lung base, possibly atelectasis versus pneumonia. Left basilar opacity is similar to prior radiograph. No appreciable pneumothorax. No evidence of acute osseous abnormality. Cystic lesion within the T12 vertebral body is better characterized on same-day CT. Impression: 1. Hazy bibasilar airspace opacities which may represent atelectasis versus pneumonia. Left basilar opacity is similar to recent radiograph while right basilar opacity is new Workstation performed: [id number]     CTA ED chest PE Study    Result Date: 9/13/2023  Narrative: CTA - CHEST WITH IV CONTRAST - PULMONARY ANGIOGRAM INDICATION:   R/O PE. Reports shortness of breath since being discharged from the hospital 3 weeks ago for pneumonia. Fatigue. Productive cough with white sputum. Angioedema. COMPARISON: Chest radiograph 9/13/2023, chest CT 8/25/2023, thoracic spine CT 11/13/2013. TECHNIQUE: CT angiogram timed for optimal opacification of the pulmonary arteries. Axial, sagittal, and coronal 2D reformats created from source data. Coronal 3D MIP postprocessing on the acquisition scanner. Radiation dose length product (DLP):  472 mGy-cm . Radiation dose exposure minimized using iterative reconstruction and automated exposure control. IV Contrast:  85 mL of iohexol (OMNIPAQUE) FINDINGS: PULMONARY ARTERIES:  No pulmonary embolus. Moderate pulmonary artery enlargement. LUNGS: Mild patchy new consolidation in the medial segment right middle lobe. Improving opacity in the left upper lobe with mild residual atelectasis/scar. Redemonstration of mild dependent atelectasis in both lower lobes. Severe emphysema. AIRWAYS: No significant filling defects.  ET tube 4 cm above the sujata. PLEURA:  Unremarkable. HEART/GREAT VESSELS: Moderate cardiomegaly. MEDIASTINUM AND PRASANTH: Slight regression of hilar and mediastinal lymphadenopathy. The largest node was previously 2.5 x 2.0 cm in the right infrahilar region and is now 1.9 x 1.3 cm (501/94). CHEST WALL AND LOWER NECK: Unremarkable. UPPER ABDOMEN: Right renal cysts. OSSEOUS STRUCTURES: Redemonstration of the large cystic lesion with sclerotic margins in T12 with destruction of the superior and inferior endplates, present as a much smaller thin-walled cystic lesion on the thoracic spine CT from 2013, imaged on a thoracic spine MRI from 2020. Impression: No pulmonary embolus. Patchy consolidation right middle lobe, new from 8/25/2023, compatible with pneumonia. Slight regression of previous hilar and mediastinal lymphadenopathy. Improving left upper lobe opacity which may have been due to pneumonia with residual atelectasis/scar. Persistent partial atelectasis of the lower lobes. 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 Workstation performed: [id number]     Echo complete w/ contrast if indicated    Result Date: 8/28/2023  Narrative: •  Left Ventricle: Left ventricular cavity size is small. Wall thickness is increased. There is severe concentric hypertrophy. The left ventricular ejection fraction is 60%. Systolic function is normal. Wall motion is normal. Diastolic function is mildly abnormal, consistent with grade I (abnormal) relaxation. There is no LV dynamic obstruction at rest. •  Right Ventricle: Right ventricular cavity size is normal. Systolic function is normal. •  Left Atrium: The atrium is mildly dilated. •  Mitral Valve: There is mild to moderate regurgitation. There is systolic anterior motion of the chordal apparatus with late peaking gradient 29 mmHg during Valsalva maneuver.  •  Pericardium: There is a trivial pericardial effusion along the right atrial free wall. XR chest portable ICU    Result Date: 8/28/2023  Narrative: CHEST INDICATION:   Acute hypoxic respiratory failure. COMPARISON: 8/25/2023 EXAM PERFORMED/VIEWS:  XR CHEST PORTABLE ICU FINDINGS: Heart shadow is enlarged but unchanged from prior exam. There is stable left-sided volume loss secondary to left basilar atelectasis. No pneumothorax or pleural effusion. Osseous structures appear within normal limits for patient age. Impression: Stable volume loss on the left secondary to left lower lobe atelectasis. Workstation performed: [id number]     VAS lower limb venous duplex study, complete bilateral    Result Date: 8/27/2023  Narrative:  [facility] REPORT CLINICAL: Indications: Patient presents with bilateral lower extremity pain x 1 days. Operative History: cardiac surgery-date unknown. Risk Factors The patient has history of HTN and CKD. She has no history of Hyperlipidemia. CONCLUSION:  Impression: RIGHT LOWER LIMB: No evidence of acute or chronic deep vein thrombosis. No evidence of superficial thrombophlebitis noted. Doppler evaluation shows a normal response to augmentation maneuvers. . Popliteal, posterior tibial and anterior tibial arterial Doppler waveform's are triphasic. LEFT LOWER LIMB: No evidence of acute or chronic deep vein thrombosis. No evidence of superficial thrombophlebitis noted. Doppler evaluation shows a normal response to augmentation maneuvers. Popliteal, posterior tibial and anterior tibial arterial Doppler waveform's are triphasic. SIGNATURE: Electronically Signed by: Ty Ulloa on 2023-08-27 08:12:52 PM    CTA ED chest PE Study    Result Date: 8/25/2023  Narrative: CTA - CHEST WITH IV CONTRAST - PULMONARY ANGIOGRAM INDICATION:   Increased SOB, recent COVID diagnosis.  COMPARISON: MRI from 3/18/2020 as well as bone scan from 7/2/2019 and thoracic spine from 11/13/2013 TECHNIQUE: CTA examination of the chest was performed using angiographic technique according to a protocol specifically tailored to evaluate for pulmonary embolism. Multiplanar 2D reformatted images were created from the source data. In addition, coronal 3D MIP postprocessing was performed on the acquisition scanner. The study is limited by some respiratory motion artifacts especially in the lower lobes on the left where there is crowding of vessels due to atelectatic changes. Radiation dose length product (DLP) for this visit:  370 mGy-cm . This examination, like all CT scans performed in the [facility], was performed utilizing techniques to minimize radiation dose exposure, including the use of iterative reconstruction and automated exposure control. IV Contrast:  80 mL of iohexol (OMNIPAQUE) FINDINGS: PULMONARY ARTERIAL TREE: Limited visualization left lower lobe however there is suspicion for a small embolus in the posterobasal segment on axial image 139, series 305 and coronal image 142. No definite filling defect is seen elsewhere. The main pulmonary artery is significantly dilated at 4.2 cm. The RV LV ratio is elevated at 1.1 cm. The primary pulmonary vascular stent minutes are also dilated chronicity is uncertain. LUNGS: Emphysema is noted with blebs at the apices. There is peripheral consolidation in the left upper lobe. Air bronchogram is not well seen and there is mucous occlusion of the left mainstem bronchus with volume loss of the left upper lobe as well as  left lower lobe to a lesser extent. Some aeration in the left lower lobe is still visible. PLEURA:  Unremarkable. HEART/GREAT VESSELS:  Unremarkable for patient's age. No thoracic aortic aneurysm. MEDIASTINUM AND PRASANTH: 10 mm pretracheal node is identified. 9 mm superior mediastinal node is identified. 10 mm prevascular node is identified. Subcarinal node measures 1.5 cm. Hilar adenopathy is also demonstrated. Right hilar node is larger measuring 1.8 cm in short axis on image 138.  CHEST WALL AND LOWER NECK:   Unremarkable. VISUALIZED STRUCTURES IN THE UPPER ABDOMEN: Low-density cyst is seen in the right kidney. . OSSEOUS STRUCTURES: There appears to be a destructive lesion involving the T12 vertebral body eroding both endplates and much of the vertebral body this does not appear to represent a Schmorl's node. A cystic lesion was noted in 2013 at this location however the current lesion is larger with loss of endplates. MRI also imaged this lesion in 2020 the lesion appears somewhat larger on the current examination however. Impression: Limited study. There is equivocal embolus in the posterior basal segment left lower lobe. Other smaller segments are difficult to assess due to motion and vascular crowding. There is mucous plugging in the left mainstem bronchus with volume loss in portions of the left lower lobe and left upper lobe. Aspiration pneumonia is not excluded. There is dilatation of the main pulmonary artery and proximal pulmonary segment suggesting pulmonary hypertension this is more likely chronic than acute given the degree of distention. Emphysema is present. There is significant mediastinal and hilar adenopathy suggesting underlying neoplasm more likely than simple reactive nodes. Enlarging lesion at T12 is again demonstrated of uncertain etiology. This has been present since 2013. Perhaps a plasmacytoma is a consideration. Neoplastic work-up is advised. Pulmonology consultation is also advised to assess endobronchial obstruction on the left. This was discussed with resident physician Dr. Janett Puga at 4:58 p.m. Follow-up was marked in the epic system Workstation performed: [id number]     XR chest 1 view portable    Result Date: 8/25/2023  Narrative: CHEST INDICATION:   SOB. COMPARISON: 8/21/2021 EXAM PERFORMED/VIEWS:  XR CHEST PORTABLE Single view FINDINGS: Development of CHF. Probable left basal infiltrate. Cardiomediastinal silhouette has become more enlarged.  No pneumothorax or pleural effusion. Osseous structures appear within normal limits for patient age. Impression: Increased cardiomegaly. Development of CHF. Probable left basal infiltrate Workstation performed: [id number]       I have personally reviewed labs, imaging studies, and pertinent reports. This note has been generated by voice recognition software system. Therefore, there may be spelling, grammar, and or syntax errors. Please contact if questions arise.      Angel Wright,    Hematology and Medical Oncology - PGY [place]

## 2023-10-03 NOTE — PROGRESS NOTES
[address] [facility]  Progress Note: Critical Care  Name: Benjy Maloney 69 y.o. female I MRN: [id number]  Unit/Bed#: ICU 03 I Date of Admission: 9/13/2023   Date of Service: 10/3/2023 I Hospital Day: 20    Assessment/Plan   * Acute respiratory failure with hypoxia secondary to oropharyngeal mass  Assessment & Plan  Cuffless trach in place since 9/17. Oxygen requirements increased. Patient appears to be quite depressed with trach in place. For mental health patient is on buspirone, quetiapine, and sertraline. For pain, gabapentin, oxycodone scheduled and prn, and hydromorphone prn. On Guaifenesin, Atrovent and Xopenex for airway maintenance. CXR 10/1 lungs not significantly changed from prior CT: Cardiomegaly, vascular congestion, bibasilar atelectasis, pleural effusion L>R. Current settings overnight 10/6/80%       Plan:  • Palliative family meeting for goals of care  • Airway clearance protocol with suction and CPT  • Attempt to wean O2 requirements    Large cell lymphoma [facility])  Assessment & Plan  Biopsy on 9/17: Large B-cell lymphoma, germinal center B-cell type, c-MYC and BCL-2 double expression. Ki-67 proliferation index is up to 90%; FISH & NGS pending. Staging work-up: Currently stage II. First inpatient chemotherapy 9/22/2023- with R-EPOCH. 9/27 Rituxan. 9/28 Filgrastim. On acyclovir, Zyloprim, Diflucan, Levaquin, Zofran, Bactrim for chemo prophylaxis, will continue until ANC > 500. Currently Neutropenic with ANC 0->0.02 and WBC 0.2. BCx NG, Sputum Cx and gram stain no bacteria.     Plan:  · Inpatient hematology oncology following  · Outpatient follow-up with hematology oncology  · Monitor ANC on CBC w Diff  · Monitor CMP for tumor lysis syndrome    (HFpEF) heart failure with preserved ejection fraction [facility])  Assessment & Plan  Wt Readings from Last 3 Encounters:   10/03/23 78.6 kg (173 lb 4.5 oz)   09/07/23 74.6 kg (164 lb 6.4 oz)   08/30/23 73.3 kg (161 lb 9.6 oz)     Lab Results Component Value Date    LVEF 60 08/28/2023     (H) 09/29/2023     (H) 09/13/2023     Echo 8/28/23: LVEF 60%. CXR 9/22: Persistent pulmonary venous congestion with small effusions and bibasilar atelectasis. Patient has not responded well to diuresis with Lasix 40, but has low albumin. Would likely respond better to Bumex. Patient received 2 doses of IV Bumex 2 g yesterday without significant change with urine output, but Cr increased by more than 0.3 meeting criteria for an PO       Plan:  • BMP, magnesium; Goal Mg > 2 and K > 4; Replete prn  • HOB > 30°, Daily standing weights, Measure I/O        Depression  Assessment & Plan  Patient on Zoloft increased from 50-75 daily yesterday, and Seroquel started yesterday evening after confirming that QT not prolonged on EKG. Patient is still quite depressed and would benefit from additional goals of care discussion with palliative care, which they are attempting to schedule with family    Generalized abdominal pain  Assessment & Plan  Patient reports abdominal pain is mainly in the epigastric area. Intermittently with coughing. Takes Famotidine for GERD at home. - Continue Famotidine  - On bowel regimen with Senna and Miralax prn    Chronic Superficial thrombophlebitis  Assessment & Plan  9/23: Patient C/o right forearm soreness. Bilateral upper extremity ultrasound was performed in setting of high risk of DVT. RIGHT UPPER LIMB U/S: No evidence of acute or chronic deep vein thrombosis. 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     -Continue DVT ppx Lovenox    Blister (nonthermal) of left forearm, sequela  Assessment & Plan  9/17/23: Blisters of LUE noted, no signs of infection    · Daily wound care    Oropharyngeal mass  Assessment & Plan  9/14 Neck/ soft tissue CT: Large enhancing soft tissue mass identified within the left neck involving multiple spaces.  This appears to be centered within the left oropharynx with extensions as described above into multiple spaces. This results in significant airway compromise. 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 Small level 3 and level 4 nodes are seen within the neck. Tracheostomy by ENT, bx & addition testing performed. Replaced on 9/26. H/o tobacco abuse, daily smoker. On nicotine while in patient    Plan:  · ENT and Med onc following  · See acute respiratory failure and large cell lymphoma above      Angioedema  Assessment & Plan  POA with acute respiratory failure, SOB. On physical in [place] ED: Swollen tongue, swelling soft and hard palate and almost the head of all phalanx is completely closed. Severe angioedema. Decadron 10 mg, Benadryl 25 mg given. Initially refused but eventually agreed with intubation. Prior episode of angioedema in 2020 noted. Suspected coadministration of increase the dose of tramadol and lisinopril. Per daughter, there is no recent change in medications except Trelegy inhaler, cefdinir. Etiology: more likely 2/2 oropharyngeal mass compression. Plan:  • Trach placed 9/17  • Tolerating trach without issue  • See acute respiratory failure with hypoxia secondary to oropharyngeal mass above    Ambulatory dysfunction  Assessment & Plan  Impacted by chronic pain syndrome, but medication regimen for this may also contribute to ambulatory dysfunction. At present patient requires tracheostomy and ICU level care. Will require PT/ OT eval before discharge. CKD (chronic kidney disease) stage 3, GFR 30-59 ml/min [facility])  Assessment & Plan  Lab Results   Component Value Date    EGFR 33 10/03/2023    EGFR 54 10/02/2023    EGFR 59 10/01/2023    CREATININE 1.56 (H) 10/03/2023    CREATININE 1.04 10/02/2023    CREATININE 0.97 10/01/2023     Baseline Cr appears to be between 0.75-1.1. Patient received 2 doses of Bumex IV 2 g yesterday as she had not been responding appropriately to IV 40 Lasix daily. Creatinine went up by 0.5 meeting criteria for an PO.   This may be prerenal intrinsic or postrenal.  Potential prerenal causes include reduced kidney perfusion due to lisinopril. Intrinsic can also be lisinopril due to ATN, AIN from chemo medications, TLS, crystaline nephropathy from acyclovir, rituxan nephrotoxicity, filgrastim glomerulonephritis. Post renal obstructive could be from urine retention from vincristine, cyclophosphamide bladder fibrosis. Suspect most likely due to medications as a combination of prerenal and intrinsic.    - Hold on Diuresis and Lisinopril  - Repeat bladder scan  - Order UA and FeNa    Pain syndrome, chronic  Assessment & Plan  Home regimen: Oxycodone 5 mg twice daily as needed. Tylenol 650 mg every 8 hours as needed. Gabapentin 600 mg 3 times daily. Medical marijuana. Pain mostly from lumbar radiculopathy. Impaired ambulatory dysfunction second to pain. Patient is taking opioids regularly for pain, has bowel regimen to manage this and is having bowel movements daily. Plan:  · Continue gabapentin 300 mg 3 times daily, scheduled oxycodone 5 mg 3 times daily, as needed Tylenol 650 mg every 6 hours. Benign essential hypertension  Assessment & Plan  Well controlled on Coreg 12.5 mg BID, Amlodipine 10 mg daily. Lisinopril 40 mg daily. Plan:  · PRN hydralazine if BP >160. · Given PO will hold lisinopril      Hyperlipidemia-resolved as of 10/2/2023  Assessment & Plan  Lab Results   Component Value Date    CHOLESTEROL 203 (H) 01/24/2023    CHOLESTEROL 177 08/11/2022    CHOLESTEROL 184 07/08/2020     Lab Results   Component Value Date    HDL 45 (L) 01/24/2023    HDL 37 (L) 08/11/2022    HDL 44 (L) 07/08/2020     Lab Results   Component Value Date    TRIG 166 (H) 09/16/2023    TRIG 160 (H) 01/24/2023    TRIG 108 08/11/2022     Lab Results   Component Value Date    NONHDLC 146 07/12/2018    [address] 207 (H) 04/29/2013     Continue outpatient diet and lifestyle modification.           ICU Core Measures     A: Assess, Prevent, and Manage Pain · Has pain been assessed? Yes  · Need for changes to pain regimen? No   B: Both SAT/SAT  · N/A   C: Choice of Sedation · RASS Goal: 0 Alert and Calm  · Need for changes to sedation or analgesia regimen? No   D: Delirium · CAM-ICU: Negative   E: Early Mobility  · Plan for early mobility? No   F: Family Engagement · Plan for family engagement today? Yes       Antibiotic Review: Continue broad spectrum secondary to severity of illness. Review of Invasive Devices:            Prophylaxis:  VTE VTE covered by:  enoxaparin, Subcutaneous, 40 mg at 10/02/23 6301       Stress Ulcer  covered byfamotidine (PEPCID) oral suspension 40 mg [[id number]], pantoprazole (PROTONIX) injection 40 mg [[id number]]          Subjective   HPI/24hr events:     Patient required a bladder scan yesterday after straight cath. She reports she peed a lot after receiving bumex but per nurse she just had the one output from straight cath. Patient reports no changes. She is going to allow suction and CPT today. Review of Systems   Constitutional: Negative for chills and fever. Respiratory: Positive for cough. Negative for choking and shortness of breath. Cardiovascular: Negative for chest pain, palpitations and leg swelling. Gastrointestinal: Positive for abdominal pain. Genitourinary: Positive for difficulty urinating. Psychiatric/Behavioral: Positive for dysphoric mood.           Objective                            Vitals I/O      Most Recent Min/Max in 24hrs   Temp 99.2 °F (37.3 °C) Temp  Min: 98 °F (36.7 °C)  Max: 99.5 °F (37.5 °C)   Pulse 103 Pulse  Min: 77  Max: 103   Resp 22 Resp  Min: 12  Max: 26   /62 BP  Min: 98/55  Max: 134/62   O2 Sat 94 % SpO2  Min: 90 %  Max: 99 %      Intake/Output Summary (Last 24 hours) at 10/3/2023 0750  Last data filed at 10/3/2023 0445  Gross per 24 hour   Intake 1430 ml   Output 625 ml   Net 805 ml         Diet Enteral/Parenteral; Tube Feeding No Oral Diet; Jevity 1.5; Continuous; 20; Every 4 hours     Invasive Monitoring Physical exam    Physical Exam  Constitutional:       General: She is not in acute distress. Appearance: She is not ill-appearing. HENT:      Head: Normocephalic and atraumatic. Cardiovascular:      Rate and Rhythm: Normal rate and regular rhythm. Heart sounds: Normal heart sounds. No murmur heard. Pulmonary:      Effort: No respiratory distress. Breath sounds: No stridor. Rales present. No wheezing. Abdominal:      General: There is no distension. Tenderness: There is abdominal tenderness. There is no guarding or rebound. Musculoskeletal:      Right lower leg: No edema. Left lower leg: No edema. Skin:     Coloration: Skin is not jaundiced or pale. Neurological:      Mental Status: She is alert and oriented to person, place, and time.    Psychiatric:      Comments: Flat affect, depressed           Diagnostic Studies       No new imaging to review at this time     Medications:  Scheduled PRN   acyclovir, 400 mg, BID  allopurinol, 300 mg, Daily  amLODIPine, 10 mg, Daily  busPIRone, 30 mg, TID  carvedilol, 12.5 mg, BID With Meals  chlorhexidine, 15 mL, Q12H ASHLEY  enoxaparin, 40 mg, Q24H ASHLEY  famotidine, 20 mg, BID  Filgrastim-aafi, 300 mcg, Daily  fluconazole, 400 mg, Daily  gabapentin, 300 mg, TID  guaiFENesin, 200 mg, Q6H  levalbuterol, 1.25 mg, Q6H   And  ipratropium, 0.5 mg, Q6H  levofloxacin, 500 mg, Q24H ASHLEY  nicotine, 21 mg, Daily  nystatin, 500,000 Units, 4x Daily  oxyCODONE, 5 mg, Q8H  QUEtiapine, 50 mg, HS  senna, 8.8 mg, HS  sertraline, 75 mg, Daily  sodium chloride, 4 mL, Q6H  sulfamethoxazole-trimethoprim, 1 tablet, Once per day on Mon Wed Fri      acetaminophen, 650 mg, Q6H PRN  alteplase, 2 mg, Q1MIN PRN  hydrALAZINE, 10 mg, Q4H PRN  HYDROmorphone, 0.2 mg, Q6H PRN  levalbuterol, 1.25 mg, Q8H PRN  lidocaine 1% buffered, , PRN  ondansetron, 4 mg, Q8H PRN  oxyCODONE, 2.5 mg, Q4H PRN   Or  oxyCODONE, 5 mg, Q4H PRN  polyethylene glycol, 17 g, Daily PRN       Continuous          Labs:    CBC    Recent Labs     10/02/23  0527 10/03/23  0440   WBC 0.21* 0.20*   HGB 8.3* 8.5*   HCT 26.8* 26.8*   PLT 68* 61*     BMP    Recent Labs     10/02/23  0527 10/03/23  0440   SODIUM 135 136   K 4.1 4.4   CL 97 95*   CO2 34* 35*   AGAP 4 6   BUN 28* 37*   CREATININE 1.04 1.56*   CALCIUM 8.5 8.9       Coags    No recent results     Additional Electrolytes  Recent Labs     10/02/23  0527 10/03/23  0440   MG 2.1 2.0   PHOS 2.8  --    CAIONIZED 1.08*  --           Blood Gas    No recent results  No recent results LFTs  Recent Labs     10/02/23  0527   ALT 20   AST 18   ALKPHOS 145*   ALB 2.5*   TBILI 0.62       Infectious  No recent results  Glucose  Recent Labs     10/02/23  0527 10/03/23  0440   GLUC 144* 131                   Vivien Jerez MD

## 2023-10-04 ENCOUNTER — DOCUMENTATION (OUTPATIENT)
Dept: HEMATOLOGY ONCOLOGY | Facility: CLINIC | Age: 68
End: 2023-10-04

## 2023-10-04 ENCOUNTER — APPOINTMENT (INPATIENT)
Dept: RADIOLOGY | Facility: HOSPITAL | Age: 68
DRG: 004 | End: 2023-10-04
Payer: COMMERCIAL

## 2023-10-04 LAB
ALBUMIN SERPL BCP-MCNC: 2.3 G/DL (ref 3.5–5)
ALP SERPL-CCNC: 232 U/L (ref 34–104)
ALT SERPL W P-5'-P-CCNC: 32 U/L (ref 7–52)
ANION GAP SERPL CALCULATED.3IONS-SCNC: 5 MMOL/L
ANISOCYTOSIS BLD QL SMEAR: PRESENT
AST SERPL W P-5'-P-CCNC: 33 U/L (ref 13–39)
BACTERIA UR QL AUTO: ABNORMAL /HPF
BASOPHILS # BLD MANUAL: 0 THOUSAND/UL (ref 0–0.1)
BASOPHILS NFR MAR MANUAL: 0 % (ref 0–1)
BILIRUB SERPL-MCNC: 0.78 MG/DL (ref 0.2–1)
BILIRUB UR QL STRIP: ABNORMAL
BUN SERPL-MCNC: 41 MG/DL (ref 5–25)
CALCIUM ALBUM COR SERPL-MCNC: 10.2 MG/DL (ref 8.3–10.1)
CALCIUM SERPL-MCNC: 8.8 MG/DL (ref 8.4–10.2)
CHLORIDE SERPL-SCNC: 95 MMOL/L (ref 96–108)
CLARITY UR: CLEAR
CO2 SERPL-SCNC: 35 MMOL/L (ref 21–32)
COLOR UR: ABNORMAL
CREAT SERPL-MCNC: 1.71 MG/DL (ref 0.6–1.3)
CREAT UR-MCNC: 130.6 MG/DL
EOSINOPHIL # BLD MANUAL: 0 THOUSAND/UL (ref 0–0.4)
EOSINOPHIL NFR BLD MANUAL: 0 % (ref 0–6)
ERYTHROCYTE [DISTWIDTH] IN BLOOD BY AUTOMATED COUNT: 15.8 % (ref 11.6–15.1)
FLUAV RNA RESP QL NAA+PROBE: NEGATIVE
FLUBV RNA RESP QL NAA+PROBE: NEGATIVE
GFR SERPL CREATININE-BSD FRML MDRD: 30 ML/MIN/1.73SQ M
GLUCOSE SERPL-MCNC: 140 MG/DL (ref 65–140)
GLUCOSE UR STRIP-MCNC: NEGATIVE MG/DL
HCT VFR BLD AUTO: 23 % (ref 34.8–46.1)
HCT VFR BLD AUTO: 24.6 % (ref 34.8–46.1)
HGB BLD-MCNC: 7.2 G/DL (ref 11.5–15.4)
HGB BLD-MCNC: 7.7 G/DL (ref 11.5–15.4)
HGB UR QL STRIP.AUTO: NEGATIVE
HYPERCHROMIA BLD QL SMEAR: PRESENT
KETONES UR STRIP-MCNC: NEGATIVE MG/DL
LEUKOCYTE ESTERASE UR QL STRIP: NEGATIVE
LYMPHOCYTES # BLD AUTO: 0.26 THOUSAND/UL (ref 0.6–4.47)
LYMPHOCYTES # BLD AUTO: 58 % (ref 14–44)
MCH RBC QN AUTO: 31 PG (ref 26.8–34.3)
MCHC RBC AUTO-ENTMCNC: 31.3 G/DL (ref 31.4–37.4)
MCV RBC AUTO: 99 FL (ref 82–98)
MONOCYTES # BLD AUTO: 0.07 THOUSAND/UL (ref 0–1.22)
MONOCYTES NFR BLD: 16 % (ref 4–12)
NEUTROPHILS # BLD MANUAL: 0.12 THOUSAND/UL (ref 1.85–7.62)
NEUTS BAND NFR BLD MANUAL: 4 % (ref 0–8)
NEUTS SEG NFR BLD AUTO: 22 % (ref 43–75)
NITRITE UR QL STRIP: NEGATIVE
NON-SQ EPI CELLS URNS QL MICRO: ABNORMAL /HPF
PH UR STRIP.AUTO: 6 [PH]
PLATELET # BLD AUTO: 77 THOUSANDS/UL (ref 149–390)
PLATELET BLD QL SMEAR: ABNORMAL
PMV BLD AUTO: 10.4 FL (ref 8.9–12.7)
POTASSIUM SERPL-SCNC: 4.6 MMOL/L (ref 3.5–5.3)
PROT SERPL-MCNC: 5.3 G/DL (ref 6.4–8.4)
PROT UR STRIP-MCNC: ABNORMAL MG/DL
RBC # BLD AUTO: 2.32 MILLION/UL (ref 3.81–5.12)
RBC #/AREA URNS AUTO: ABNORMAL /HPF
RSV RNA RESP QL NAA+PROBE: NEGATIVE
SARS-COV-2 RNA RESP QL NAA+PROBE: NEGATIVE
SODIUM 24H UR-SCNC: 17 MOL/L
SODIUM SERPL-SCNC: 135 MMOL/L (ref 135–147)
SP GR UR STRIP.AUTO: 1.02 (ref 1–1.03)
UROBILINOGEN UR STRIP-ACNC: 8 MG/DL
WBC # BLD AUTO: 0.45 THOUSAND/UL (ref 4.31–10.16)
WBC #/AREA URNS AUTO: ABNORMAL /HPF

## 2023-10-04 PROCEDURE — 84300 ASSAY OF URINE SODIUM: CPT

## 2023-10-04 PROCEDURE — 94760 N-INVAS EAR/PLS OXIMETRY 1: CPT

## 2023-10-04 PROCEDURE — 80053 COMPREHEN METABOLIC PANEL: CPT

## 2023-10-04 PROCEDURE — 82570 ASSAY OF URINE CREATININE: CPT

## 2023-10-04 PROCEDURE — 85007 BL SMEAR W/DIFF WBC COUNT: CPT

## 2023-10-04 PROCEDURE — 99232 SBSQ HOSP IP/OBS MODERATE 35: CPT | Performed by: INTERNAL MEDICINE

## 2023-10-04 PROCEDURE — 94669 MECHANICAL CHEST WALL OSCILL: CPT

## 2023-10-04 PROCEDURE — 87205 SMEAR GRAM STAIN: CPT

## 2023-10-04 PROCEDURE — 94150 VITAL CAPACITY TEST: CPT

## 2023-10-04 PROCEDURE — 99291 CRITICAL CARE FIRST HOUR: CPT | Performed by: INTERNAL MEDICINE

## 2023-10-04 PROCEDURE — 97168 OT RE-EVAL EST PLAN CARE: CPT

## 2023-10-04 PROCEDURE — 71045 X-RAY EXAM CHEST 1 VIEW: CPT

## 2023-10-04 PROCEDURE — 85014 HEMATOCRIT: CPT

## 2023-10-04 PROCEDURE — 97164 PT RE-EVAL EST PLAN CARE: CPT

## 2023-10-04 PROCEDURE — 85027 COMPLETE CBC AUTOMATED: CPT

## 2023-10-04 PROCEDURE — 94640 AIRWAY INHALATION TREATMENT: CPT

## 2023-10-04 PROCEDURE — 0241U HB NFCT DS VIR RESP RNA 4 TRGT: CPT

## 2023-10-04 PROCEDURE — 85018 HEMOGLOBIN: CPT

## 2023-10-04 PROCEDURE — 94668 MNPJ CHEST WALL SBSQ: CPT

## 2023-10-04 PROCEDURE — 81001 URINALYSIS AUTO W/SCOPE: CPT

## 2023-10-04 PROCEDURE — 94003 VENT MGMT INPAT SUBQ DAY: CPT

## 2023-10-04 PROCEDURE — 97110 THERAPEUTIC EXERCISES: CPT

## 2023-10-04 RX ORDER — ALBUMIN, HUMAN INJ 5% 5 %
25 SOLUTION INTRAVENOUS ONCE
Status: COMPLETED | OUTPATIENT
Start: 2023-10-04 | End: 2023-10-04

## 2023-10-04 RX ORDER — ENOXAPARIN SODIUM 100 MG/ML
30 INJECTION SUBCUTANEOUS
Status: DISCONTINUED | OUTPATIENT
Start: 2023-10-05 | End: 2023-10-10

## 2023-10-04 RX ADMIN — ALLOPURINOL 300 MG: 100 TABLET ORAL at 09:09

## 2023-10-04 RX ADMIN — ALBUMIN (HUMAN) 25 G: 12.5 INJECTION, SOLUTION INTRAVENOUS at 13:17

## 2023-10-04 RX ADMIN — SODIUM CHLORIDE SOLN NEBU 3% 4 ML: 3 NEBU SOLN at 19:59

## 2023-10-04 RX ADMIN — SERTRALINE 75 MG: 25 TABLET, FILM COATED ORAL at 09:08

## 2023-10-04 RX ADMIN — NYSTATIN 500000 UNITS: 100000 SUSPENSION ORAL at 21:13

## 2023-10-04 RX ADMIN — IPRATROPIUM BROMIDE 0.5 MG: 0.5 SOLUTION RESPIRATORY (INHALATION) at 19:58

## 2023-10-04 RX ADMIN — SULFAMETHOXAZOLE AND TRIMETHOPRIM 1 TABLET: 800; 160 TABLET ORAL at 09:08

## 2023-10-04 RX ADMIN — GUAIFENESIN 200 MG: 200 SOLUTION ORAL at 16:36

## 2023-10-04 RX ADMIN — NYSTATIN 500000 UNITS: 100000 SUSPENSION ORAL at 09:11

## 2023-10-04 RX ADMIN — CHLORHEXIDINE GLUCONATE 15 ML: 1.2 SOLUTION ORAL at 21:13

## 2023-10-04 RX ADMIN — GUAIFENESIN 200 MG: 200 SOLUTION ORAL at 21:13

## 2023-10-04 RX ADMIN — OXYCODONE HYDROCHLORIDE 5 MG: 5 SOLUTION ORAL at 05:58

## 2023-10-04 RX ADMIN — GABAPENTIN 300 MG: 300 CAPSULE ORAL at 21:14

## 2023-10-04 RX ADMIN — GABAPENTIN 300 MG: 300 CAPSULE ORAL at 16:36

## 2023-10-04 RX ADMIN — ENOXAPARIN SODIUM 40 MG: 40 INJECTION SUBCUTANEOUS at 09:10

## 2023-10-04 RX ADMIN — GUAIFENESIN 200 MG: 200 SOLUTION ORAL at 09:09

## 2023-10-04 RX ADMIN — FLUCONAZOLE 400 MG: 100 TABLET ORAL at 09:07

## 2023-10-04 RX ADMIN — ACYCLOVIR 400 MG: 200 CAPSULE ORAL at 17:31

## 2023-10-04 RX ADMIN — CHLORHEXIDINE GLUCONATE 15 ML: 1.2 SOLUTION ORAL at 09:09

## 2023-10-04 RX ADMIN — QUETIAPINE FUMARATE 50 MG: 25 TABLET ORAL at 21:14

## 2023-10-04 RX ADMIN — IPRATROPIUM BROMIDE 0.5 MG: 0.5 SOLUTION RESPIRATORY (INHALATION) at 14:07

## 2023-10-04 RX ADMIN — SODIUM CHLORIDE SOLN NEBU 3% 4 ML: 3 NEBU SOLN at 08:10

## 2023-10-04 RX ADMIN — SODIUM CHLORIDE SOLN NEBU 3% 4 ML: 3 NEBU SOLN at 01:31

## 2023-10-04 RX ADMIN — FILGRASTIM-AAFI 300 MCG: 300 INJECTION, SOLUTION SUBCUTANEOUS at 09:12

## 2023-10-04 RX ADMIN — BUSPIRONE HYDROCHLORIDE 30 MG: 10 TABLET ORAL at 21:32

## 2023-10-04 RX ADMIN — NYSTATIN 500000 UNITS: 100000 SUSPENSION ORAL at 12:21

## 2023-10-04 RX ADMIN — NICOTINE 21 MG: 21 PATCH, EXTENDED RELEASE TRANSDERMAL at 09:10

## 2023-10-04 RX ADMIN — LEVALBUTEROL HYDROCHLORIDE 1.25 MG: 1.25 SOLUTION RESPIRATORY (INHALATION) at 08:10

## 2023-10-04 RX ADMIN — BUSPIRONE HYDROCHLORIDE 30 MG: 10 TABLET ORAL at 16:36

## 2023-10-04 RX ADMIN — BUSPIRONE HYDROCHLORIDE 30 MG: 10 TABLET ORAL at 09:31

## 2023-10-04 RX ADMIN — LEVOFLOXACIN 500 MG: 250 TABLET, FILM COATED ORAL at 09:09

## 2023-10-04 RX ADMIN — LEVALBUTEROL HYDROCHLORIDE 1.25 MG: 1.25 SOLUTION RESPIRATORY (INHALATION) at 19:58

## 2023-10-04 RX ADMIN — SODIUM CHLORIDE SOLN NEBU 3% 4 ML: 3 NEBU SOLN at 14:07

## 2023-10-04 RX ADMIN — LEVALBUTEROL HYDROCHLORIDE 1.25 MG: 1.25 SOLUTION RESPIRATORY (INHALATION) at 01:31

## 2023-10-04 RX ADMIN — FAMOTIDINE 20 MG: 20 TABLET, FILM COATED ORAL at 17:32

## 2023-10-04 RX ADMIN — GABAPENTIN 300 MG: 300 CAPSULE ORAL at 09:08

## 2023-10-04 RX ADMIN — OXYCODONE HYDROCHLORIDE 5 MG: 5 SOLUTION ORAL at 21:31

## 2023-10-04 RX ADMIN — IPRATROPIUM BROMIDE 0.5 MG: 0.5 SOLUTION RESPIRATORY (INHALATION) at 01:31

## 2023-10-04 RX ADMIN — GUAIFENESIN 200 MG: 200 SOLUTION ORAL at 01:36

## 2023-10-04 RX ADMIN — IPRATROPIUM BROMIDE 0.5 MG: 0.5 SOLUTION RESPIRATORY (INHALATION) at 08:10

## 2023-10-04 RX ADMIN — Medication 8.8 MG: at 21:13

## 2023-10-04 RX ADMIN — NYSTATIN 500000 UNITS: 100000 SUSPENSION ORAL at 17:31

## 2023-10-04 RX ADMIN — FAMOTIDINE 20 MG: 20 TABLET, FILM COATED ORAL at 09:10

## 2023-10-04 RX ADMIN — ACYCLOVIR 400 MG: 200 CAPSULE ORAL at 09:10

## 2023-10-04 RX ADMIN — LEVALBUTEROL HYDROCHLORIDE 1.25 MG: 1.25 SOLUTION RESPIRATORY (INHALATION) at 14:06

## 2023-10-04 NOTE — OCCUPATIONAL THERAPY NOTE
Occupational Therapy Re- Evaluation     Patient Name: Wilma BLACKMON Date: 10/4/2023  Problem List  Principal Problem:    Acute respiratory failure with hypoxia secondary to oropharyngeal mass  Active Problems:    Benign essential hypertension    Pain syndrome, chronic    CKD (chronic kidney disease) stage 3, GFR 30-59 ml/min (HCC)    (HFpEF) heart failure with preserved ejection fraction (HCC)    Ambulatory dysfunction    Angioedema    Oropharyngeal mass    Blister (nonthermal) of left forearm, sequela    Large cell lymphoma (HCC)    Chronic Superficial thrombophlebitis    Generalized abdominal pain    Depression    Past Medical History  Past Medical History:   Diagnosis Date    Anxiety     Depression     Fatty liver     Hyperlipidemia     Hypertension     Psychiatric disorder     Varicella      Past Surgical History  Past Surgical History:   Procedure Laterality Date    BREAST SURGERY Bilateral     Reduction Procedure    CARDIAC SURGERY       SECTION      x4    DILATION AND CURETTAGE OF UTERUS      ECTOPIC PREGNANCY SURGERY      IR GASTROSTOMY TUBE PLACEMENT  2023     [address] INCL FLUOR GDNCE DX W/CELL WASHG [address] [facility] N/A 2023    Procedure: BRONCHOSCOPY FLEXIBLE;  Surgeon: Ruben Jacome MD;  [facility]: [facility];  Service: ENT    TRACHEOSTOMY N/A 2023    Procedure: TRACHEOSTOMY, biopsy of nasopharynx mass;  Surgeon: Ruben Jacome MD;  [facility];  Service: ENT         10/04/23 1050   OT Last Visit   OT Visit Date 10/04/23   Note Type   Note type Re-Evaluation   Pain Assessment   Pain Assessment Tool 0-10   Pain Score 10 - Worst Possible Pain   Pain Location/Orientation Location: Neck  (trach site)   Pain Onset/Description Onset: Ongoing;Frequency: Constant/Continuous   Effect of Pain on Daily Activities comfort, activity tolerance   Hospital Pain Intervention(s) Repositioned; Ambulation/increased activity; Emotional support  (RT made aware)   Multiple Pain Sites No Restrictions/Precautions   Weight Bearing Precautions Per Order No   Other Precautions Fall Risk; Chair Alarm; Bed Alarm;Multiple lines;Pain;O2;Telemetry;Immunosuppressed  (trach / vent, PEG. Neutropenic precautions)   Prior Function   Comments please see IE from 9/21 for home set up and PLOF information. Lifestyle   Autonomy PTA pt living with daughter in [facility] with [address], pt requiring (A) with ADLs and IADLs, (+)falls, (-)drives use of quad cane vs RW at baseline   Reciprocal Relationships supportive daughter however daughter does work during the day   Service to Others retired   Intrinsic Gratification unable to state at this time   General   Additional Pertinent History Pt admitted as transfer from [facility] d/t SOB, indubated. Incidental findings of L naso/oropharangeal mass, Trach placed on 9/17. Continues to need vent intermittently. PMHx: large cell lymphoma, chronic supervicial thrombophlebitis, CHF, angioedema, HTN, CKD. Bipolar, chemo induced neutropenia   Family/Caregiver Present No   Subjective   Subjective pt mouthing words during session d/t trach   ADL   Eating Assistance Unable to assess   Eating Deficit NPO  (trach/PEG)   Grooming Assistance 5  Supervision/Setup   UB Bathing Assistance 4  Minimal Assistance   LB Bathing Assistance 3  Moderate Assistance   UB Dressing Assistance 3  Moderate Assistance   LB Dressing Assistance 3  Moderate Assistance   LB Dressing Deficit Pull up over hips; Thread LLE into underwear; Thread RLE into underwear; Increased time to complete;Supervision/safety;Verbal cueing;Setup; Requires assistive device for steadying  (intermittent A to thread BLE, completes majority without assistance, rest break between LEs d/t fatigue.  pulls over pants with CGA steadying + A to pull up in back.)   Toileting Assistance  2  Maximal Assistance   Functional Assistance 3  Moderate Assistance   Additional Comments Did not observe eating, UB bathing, LB bathing and UB dressing at time of evaluation, with use of clinical reasoning, pt's performance throughout evaluation indicates that pt may be able to perform these tasks at the levels listed above   Bed Mobility   Supine to Sit 3  Moderate assistance   Additional items Assist x 1; Increased time required;Verbal cues;LE management   Sit to Supine   (seated OOB in recliner at end of session)   Additional Comments Sat EOB c close supervision   Transfers   Sit to Stand 3  Moderate assistance   Additional items Assist x 1; Increased time required;Verbal cues   Stand to Sit 3  Moderate assistance   Additional items Assist x 1; Increased time required;Verbal cues  (cues for controlled descent, poor application of hand placement)   Additional Comments VC for hand placements during trasnfers with fair application, pt refers to pull on RW. sit<>stand x2 completed   Functional Mobility   Functional Mobility 3  Moderate assistance   Additional Comments functional mobility short distance bed > recliner c Rw. A of 2nd staff member for line management. Pt requires A for Rw management intermittently   Additional items Rolling walker   Balance   Static Sitting Fair +   Dynamic Sitting Fair   Static Standing Fair -   Dynamic Standing Poor +   Activity Tolerance   Activity Tolerance Patient limited by fatigue   Medical Staff [id number] coordination c PT Tory. RT Reshma Ashby.  CM   Nurse Made Aware TINO Ervin   RUE Assessment   RUE Assessment WFL  (Shoulder flexion ~100 degrees, MMT grossly 3+/5)   LUE Assessment   LUE Assessment WFL  (Shoulder flexion ~100 degrees, MMT grossly 3+/5)   Hand Function   Gross Motor Coordination Functional   Fine Motor Coordination Functional   Vision-Basic Assessment   Current Vision Wears glasses only for reading   Vision - Complex Assessment   Acuity   (unable to read time on clocl)   Cognition   Overall Cognitive Status   (higher level deficits noted, intermittent periods of confusion, will continue to assess)   Arousal/Participation Alert; Cooperative   Attention Attends with cues to redirect   Orientation Level Oriented to person;Oriented to place;Oriented to time  (+ month / year, recognizes hospital setting, reports incorrect name.)   Memory Decreased recall of precautions;Decreased recall of recent events   Following Commands Follows one step commands with increased time or repetition   Comments Pt c intermittent episodes of confusion. Incraesed processing time. Assessment   Limitation Decreased ADL status; Decreased UE strength;Decreased cognition;Decreased Safe judgement during ADL;Decreased endurance;Decreased self-care trans;Decreased high-level ADLs  (+ pain, poor trunk control, balance, functional reach, activity tolerance)   Prognosis Good   Assessment Pt is a 69 y.o. female [facility] due to ARF c hypoxia 2/2 oropharyngeal mass. Pt seen on this date for OT Re-Evaluation due to expiration of goals. Please refer to H&P/ initial OT eval () for detailed PMH and social history. At baseline pt requires A with ADLs, mod (I) c SPC vs RW. Upon re-eval pt performed as is listed above, demonstrating improved tolerance for functional mobility, but  regression in transfer status, functional reach, and balance. Pt would benefit from continued skilled OT treatment in order to maximize safety, independence and overall performance with ADLs, functional transfers, functional mobility and cognition in order to achieve highest level of function. Updated goals are listed below   Goals   Patient Goals to have less pain   Plan   Treatment Interventions ADL retraining;Functional transfer training;UE strengthening/ROM; Endurance training;Patient/family training;Equipment evaluation/education; Compensatory technique education;Continued evaluation; Activityengagement   Goal Expiration Date 10/14/23  (initial goals , see below for updated goals and POC)   OT Treatment Day 1   OT Frequency 3-5x/wk   Recommendation   OT Discharge Recommendation Post acute rehabilitation services   Additional Comments  The patient's raw score on the AM-PAC Daily Activity Inpatient Short Form is 13. A raw score of less than 19 suggests the patient may benefit from discharge to post-acute rehabilitation services. Please refer to the recommendation of the Occupational Therapist for safe discharge planning. -PAC Daily Activity Inpatient   Lower Body Dressing 2   Bathing 2   Toileting 2   Upper Body Dressing 3   Grooming 3   Eating 1  (NPO, anticipate 3)   Daily Activity Raw Score 13   Daily Activity Standardized Score (Calc for Raw Score >=11) 32.03   AM-[facility] Applied Cognition Inpatient   Following a Speech/Presentation 3   Understanding Ordinary Conversation 4   Taking Medications 2   Remembering Where Things Are Placed or Put Away 3   Remembering List of 4-5 Errands 3   Taking Care of Complicated Tasks 2   Applied Cognition Raw Score 17   Applied Cognition Standardized Score 36.52   End of Consult   Education Provided Yes   Patient Position at End of Consult Seated edge of bed;Bed/Chair alarm activated; All needs within reach   Nurse Communication Nurse aware of consult        GOALS:   Goals updated following re-evaluation in order to promote pt's established goal of going home     -Patient will perform grooming tasks in stance with overall Mod I in order to increase overall independence     -Patient will be supervision with UB dressing using AE and AD as needed in order to increase (I) with ADLs     -Patient will be supervision  with UB bathing using AE and AD as needed in order to increase (I) with ADLs     -Patient will be Min A with LB dressing with use of AE and AD as needed in order to increase (I) with ADLs     -Patient will be Min A with LB bathing with use of AE and AD as needed in order to increase (I) with ADLs     -Patient will complete toileting w/ supervision w/ G hygiene/thoroughness in order to reduce caregiver burden     -Patient will demonstrate supervision with bed mobility for ability to manage own comfort and initiate OOB tasks.      -Patient will perform functional transfers with supervision to/from all surfaces using DME as needed in order to increase (I) with functional tasks     -Patient will be supervision with functional mobility to/from bathroom for increased independence with toileting tasks     -Patient will independently integrate one pacing strategy into morning ADL's.     -Patient will demonstrate standing for 3 min in order to increase active participation in functional activities  Pt benefited from co-session of skilled OT and PT therapists in order to most appropriately address functional deficits d/t acute medical complexity , evolving medical status , and decreased activity tolerance. OT/PT objectives were addressed separately; please see PT note for specific goal areas targeted.        Sundeep Romo, OT

## 2023-10-04 NOTE — WOUND OSTOMY CARE
Progress Note - Wound   Leeanna Powell 69 y.o. female MRN: [id number]  Unit/Bed#: ICU 03 Encounter: [id number]      Assessment:   Patient seen today for wound care follow up assessment. Patient is incontinent of bowel and bladder. Patient is max assist with turning from side to side for assessment. Patient is independent with meals.      Assessment Findings:   Sacral/buttocks and B/L heels intact and blanching, preventative skin care orders placed.      1. Unstageable pressure injury under trach- area of full thickness skin loss from pressure from trach plate, wound tissue is 75% yellow slough and 25% pink and periwound skin fragile, moderate serosanguineous drainge.      No induration, fluctuance, odor, warmth/temperature differences, redness, or purulence noted to the above noted wounds and skin areas assessed. New dressings applied per orders listed below. Patient tolerated well- no s/s of non-verbal pain or discomfort observed during the encounter. Bedside nurse aware of plan of care. See flow sheets for more detailed assessment findings. Wound care will continue to follow.      Skin care plans:  1-Hydraguard to bilateral sacrum, buttock and heels BID and PRN  2-Elevate heels to offload pressure. 3-Ehob cushion in chair when out of bed. 4-Moisturize skin daily with skin nourishing cream.  5-Turn/reposition q2h or when medically stable for pressure re-distribution on skin. 6-Cleanse neck wound with normal saline, apply maxorb to wound, cover with split optifoam, change daily and PRN soilage/displacement. Wound 09/17/23 Neck N/A (Active)   Wound Image   10/04/23 1022   Wound Description Fragile;Granulation tissue; Yellow 10/04/23 1022   Tracy-wound Assessment Clean;Dry; Intact 10/04/23 1022   Wound Length (cm) 1 cm 10/04/23 1022   Wound Width (cm) 1.3 cm 10/04/23 1022   Wound Depth (cm) 0.2 cm 10/04/23 1022   Wound Surface Area (cm^2) 1.3 cm^2 10/04/23 1022   Wound Volume (cm^3) 0.26 cm^3 10/04/23 1022 Calculated Wound Volume (cm^3) 0.26 cm^3 10/04/23 1022   Tunneling 0 cm 10/04/23 1022   Tunneling in depth located at 0 10/04/23 1022   Undermining 0 10/04/23 1022   Undermining is depth extending from 0 10/04/23 1022   Wound Site Closure MICA 10/04/23 1022   Drainage Amount Small 10/04/23 1022   Drainage Description Serosanguineous 10/04/23 1022   Non-staged Wound Description Full thickness 10/04/23 1022   Treatments Cleansed 10/04/23 1022   Dressing Dry dressing 10/04/23 1022   Wound packed? No 10/04/23 1022   Packing- # removed 0 10/04/23 1022   Packing- # inserted 0 10/04/23 1022   Dressing Changed New 10/04/23 1022   Patient Tolerance Tolerated well 10/04/23 1022   Dressing Status Clean;Dry; Intact 10/04/23 1022       Wound 09/21/23 Pressure Injury Throat Medial (Active)   Wound Image   09/28/23 0934   Wound Description [facility] 10/02/23 1835   Tracy-wound Assessment Maceration 09/28/23 1600   Wound Length (cm) 1.2 cm 09/28/23 0934   Wound Width (cm) 2 cm 09/28/23 0934   Wound Depth (cm) 0.2 cm 09/28/23 0934   Wound Surface Area (cm^2) 2.4 cm^2 09/28/23 0934   Wound Volume (cm^3) 0.48 cm^3 09/28/23 0934   Calculated Wound Volume (cm^3) 0.48 cm^3 09/28/23 0934   Change in Wound Size % -140 09/28/23 0934   Tunneling 0 cm 09/28/23 0934   Tunneling in depth located at 0 09/28/23 0934   Undermining 0 09/28/23 0934   Undermining is depth extending from 0 09/28/23 0934   Wound Site Closure IMCA 09/28/23 0934   Drainage Amount Moderate 10/02/23 1835   Drainage Description Serosanguineous 10/02/23 1835   Non-staged Wound Description Full thickness 10/02/23 1835   Treatments Site care;Cleansed 10/02/23 1835   Dressing Other (Comment) 10/03/23 1200   Wound packed? No 09/28/23 0934   Packing- # removed 0 09/28/23 0934   Packing- # inserted 0 09/28/23 4702   Dressing Changed New 10/02/23 1835   Patient Tolerance Tolerated well 10/02/23 1835   Dressing Status Clean;Dry; Intact 10/04/23 0800         Megan ARGUETAN, RN, [facility]

## 2023-10-04 NOTE — PHYSICAL THERAPY NOTE
PT RE-EVALUATION/TREATMENT     10/04/23 1113   PT Last Visit   PT Visit Date 10/04/23   Note Type   Note type Re-Evaluation   Pain Assessment   Pain Assessment Tool 0-10   Pain Score 10 - Worst Possible Pain   Pain Location/Orientation   (Throat/trach site)   Pain Onset/Description Onset: Ongoing;Frequency: Constant/Continuous   Effect of Pain on Daily Activities limits comfort and mobility   Patient's Stated Pain Goal No pain   Hospital Pain Intervention(s) Repositioned; Ambulation/increased activity; Emotional support; Rest   Restrictions/Precautions   Other Precautions Fall Risk;Bed Alarm; Chair Alarm;Multiple lines  (Trach/vent; feeding tube)   General   Additional Pertinent History Patient seen for PT reevaluation/treatment due to Pt back on vent 2/2 respiratory issues/desaturation. Family/Caregiver Present No   Cognition   Arousal/Participation Persistent stimuli required  (When in bed, patient falls asleep on/off through PT session)   Orientation Level Oriented to person  (knows month and year;knows hospital, not name of hospital)   Following Commands Follows one step commands with increased time or repetition   Comments At least 2 patient identifiers including name and date of birth. At times, patient is slow to respond to questions, commands and directions. Subjective   Subjective Patient complains of 10/10 pain at throat/trach site. Respiratory therapist and RN aware. Strength RLE   R Hip Flexion 3-/5   R Knee Extension 3-/5   R Ankle Dorsiflexion 3/5   Strength LLE   L Hip Flexion 3-/5   L Knee Extension 3-/5   L Ankle Dorsiflexion 3/5   Light Touch   RLE Light Touch Grossly intact   LLE Light Touch Grossly intact   Proprioception   RLE Proprioception Grossly intact   LLE Proprioception Grossly Intact   Bed Mobility   Supine to Sit 3  Moderate assistance   Additional items Assist x 1;Verbal cues;HOB elevated;LE management; Increased time required   Transfers   Sit to Stand 3  Moderate assistance Additional items Assist x 1;Verbal cues; Increased time required  (Cues for safe hand placement however patient keeps her hands on the roller walker)   Stand to Sit 3  Moderate assistance   Additional items Assist x 1;Verbal cues; Increased time required  (Cues for safe hand placement although patient keeps her hands on the roller walker)   Ambulation/Elevation   Gait pattern Short stride; Step to;Decreased foot clearance;Decreased heel strike   Gait Assistance 3  Moderate assist  (Assist of another for multiple lines)   Additional items Assist x 1;Verbal cues; Tactile cues   Assistive Device Rolling walker   Distance 4 to 5 feet bed to chair; increased assist for roller walker placement, control and direction   Balance   Static Sitting Fair +   Static Standing Fair -  (With roller walker)   Ambulatory Poor +  (With roller walker)   Endurance Deficit   Endurance Deficit Yes   Endurance Deficit Description Limited standing, gait and overall activity endurance   Activity Tolerance   Activity Tolerance Patient limited by fatigue   Medical Staff Made Aware Care coordination with NATO Wallace   Assessment   Problem List Decreased strength;Decreased range of motion;Decreased endurance; Impaired balance;Decreased mobility; Decreased coordination;Decreased cognition; Impaired judgement;Decreased safety awareness;Decreased skin integrity   Assessment Patient seen for PT reevaluation and treatment this a.m. patient is noted with improved participation in lower extremity exercises compared to previous PT treatment session, however, participates approximately 25% of the time. Due to prolonged hospitalization and immobilization as well as medical status, patient is presenting with minimal decrease in strength to lower extremities, a decrease in functional mobility/bed mobility, decreased transfers and ambulation status from PT initial evaluation.   While admitted, patient will continue to benefit from skilled physical therapy services to prevent further loss of strength and active range of motion, and to improve functional mobility, balance, endurance, transfers and gait as tolerated/medically able. When medically appropriate for discharge, patient is appropriate for postacute rehab services. Physical therapy goals updated this PT session. The patient's AM-[facility] Basic Mobility Inpatient Short Form Raw Score is 10. A Raw score of less than or equal to 16 suggests the patient may benefit from discharge to post-acute rehabilitation services. Please also refer to the recommendation of the Physical Therapist for safe discharge planning. Goals   Patient Goals less pain   STG Expiration Date 10/14/23   Short Term Goal #1 Patient will: Increase bilateral LE strength 1/2 grade to facilitate independent mobility, Perform all bed mobility tasks w/ minx1 to improve pt's independence w/ repositioning for decrease risk of skin breakdown, Perform all transfers w/ minx1 consistently from various height surfaces in order to improve I w/ engagement w/ real-world environments/situations, Ambulate at least 10+ ft. with roller walker w/ minx1 w/o LOB to facilitate return and engagement w/ previous living environment, Increase ambulatory and static and dynamic standing balance 1 grade to decrease risk for falls, Tolerate at least 15 consecutive minutes of activity to demonstrate improved activity tolerance and endurance and Tolerate 3 hr OOB to faciliate upright tolerance   Plan   Treatment/Interventions ADL retraining;Functional transfer training;LE strengthening/ROM; Therapeutic exercise; Endurance training;Cognitive reorientation;Patient/family training;Equipment eval/education; Bed mobility;Gait training; Compensatory technique education;OT;Spoke to nursing   PT Frequency 3-5x/wk   Recommendation   PT Discharge Recommendation Post acute rehabilitation services   -[facility] Basic Mobility Inpatient   Turning in Flat Bed Without Bedrails 2   Lying on Back to Sitting on Edge of Flat Bed Without Bedrails 2   Moving Bed to Chair 2   Standing Up From Chair Using Arms 2   Walk in Room 1   Climb 3-5 Stairs With Railing 1   Basic Mobility Inpatient Raw Score 10   Turning Head Towards Sound 3   Follow Simple Instructions 2   Low Function Basic Mobility Raw Score  15   Low Function Basic Mobility Standardized Score  23.9   Highest Level Of Mobility   -[facility] Goal 4: Move to chair/commode   JH-HLM Achieved 4: Move to chair/commode   Barthel Index   Feeding 5   Bathing 0   Grooming Score 0   Dressing Score 5   Bladder Score 5   Bowels Score 5   Toilet Use Score 5   Transfers (Bed/Chair) Score 5   Mobility (Level Surface) Score 0   Stairs Score 0   Barthel Index Score 30   Exercises   Heelslides AAROM;10 reps;Bilateral;Supine   Hip Abduction AAROM;10 reps;Bilateral;Supine   Knee AROM Short Arc Quad AAROM;10 reps;Bilateral;Supine   Ankle Pumps AAROM;10 reps;Bilateral;Supine   End of Consult   Patient Position at End of Consult Bedside chair;Bed/Chair alarm activated; All needs within reach   [employer] Number  [address], [place] [id number]   Portions of the documentation may have been created using voice recognition software. Occasional wrong word or sound alike substitutions may have occurred due to the inherent limitation of the voice recognition software. Read the chart carefully and recognize, using context, where substitutions have occurred.

## 2023-10-04 NOTE — PLAN OF CARE
Problem: OCCUPATIONAL THERAPY ADULT  Goal: Performs self-care activities at highest level of function for planned discharge setting. See evaluation for individualized goals. Description: Treatment Interventions: ADL retraining, Functional transfer training, Endurance training, Patient/family training, Equipment evaluation/education, Compensatory technique education, Activityengagement, Energy conservation          See flowsheet documentation for full assessment, interventions and recommendations. Outcome: Not Progressing  Note: Limitation: Decreased ADL status, Decreased UE strength, Decreased cognition, Decreased Safe judgement during ADL, Decreased endurance, Decreased self-care trans, Decreased high-level ADLs (+ pain, poor trunk control, balance, functional reach, activity tolerance)  Prognosis: Good  Assessment: Pt is a 69 y.o. 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 Pt seen on this date for OT Re-Evaluation due to expiration of goals. Please refer to H&P/ initial OT eval (9/21) for detailed PMH and social history. At baseline pt requires A with ADLs, mod (I) c SPC vs RW. Upon re-eval pt performed as is listed above, demonstrating improved tolerance for functional mobility, but  regression in transfer status, functional reach, and balance. Pt would benefit from continued skilled OT treatment in order to maximize safety, independence and overall performance with ADLs, functional transfers, functional mobility and cognition in order to achieve highest level of function.  Updated goals are listed below     OT Discharge Recommendation: Post acute rehabilitation services        Mayo Amin OT

## 2023-10-04 NOTE — PLAN OF CARE
Problem: MOBILITY - ADULT  Goal: Maintain or return to baseline ADL function  Description: INTERVENTIONS:  -  Assess patient's ability to carry out ADLs; assess patient's baseline for ADL function and identify physical deficits which impact ability to perform ADLs (bathing, care of mouth/teeth, toileting, grooming, dressing, etc.)  - Assess/evaluate cause of self-care deficits   - Assess range of motion  - Assess patient's mobility; develop plan if impaired  - Assess patient's need for assistive devices and provide as appropriate  - Encourage maximum independence but intervene and supervise when necessary  - Involve family in performance of ADLs  - Assess for home care needs following discharge   - Consider OT consult to assist with ADL evaluation and planning for discharge  - Provide patient education as appropriate  Outcome: Progressing  Goal: Maintains/Returns to pre admission functional level  Description: INTERVENTIONS:  - Perform BMAT or MOVE assessment daily.   - Set and communicate daily mobility goal to care team and patient/family/caregiver. - Collaborate with rehabilitation services on mobility goals if consulted  - Perform Range of Motion  times a day. - Reposition patient every 2 hours.   - Dangle patient  times a day  - Stand patient  times a day  - Ambulate patient  times a day  - Out of bed to chair  times a day   - Out of bed for meals  times a day  - Out of bed for toileting  - Record patient progress and toleration of activity level   Outcome: Progressing     Problem: Prexisting or High Potential for Compromised Skin Integrity  Goal: Skin integrity is maintained or improved  Description: INTERVENTIONS:  - Identify patients at risk for skin breakdown  - Assess and monitor skin integrity  - Assess and monitor nutrition and hydration status  - Monitor labs   - Assess for incontinence   - Turn and reposition patient  - Assist with mobility/ambulation  - Relieve pressure over bony prominences  - Avoid friction and shearing  - Provide appropriate hygiene as needed including keeping skin clean and dry  - Evaluate need for skin moisturizer/barrier cream  - Collaborate with interdisciplinary team   - Patient/family teaching  - Consider wound care consult   Outcome: Progressing     Problem: Nutrition/Hydration-ADULT  Goal: Nutrient/Hydration intake appropriate for improving, restoring or maintaining nutritional needs  Description: Monitor and assess patient's nutrition/hydration status for malnutrition. Collaborate with interdisciplinary team and initiate plan and interventions as ordered. Monitor patient's weight and dietary intake as ordered or per policy. Utilize nutrition screening tool and intervene as necessary. Determine patient's food preferences and provide high-protein, high-caloric foods as appropriate.      INTERVENTIONS:  - Monitor oral intake, urinary output, labs, and treatment plans  - Assess nutrition and hydration status and recommend course of action  - Evaluate amount of meals eaten  - Assist patient with eating if necessary   - Allow adequate time for meals  - Recommend/ encourage appropriate diets, oral nutritional supplements, and vitamin/mineral supplements  - Order, calculate, and assess calorie counts as needed  - Recommend, monitor, and adjust tube feedings and TPN/PPN based on assessed needs  - Assess need for intravenous fluids  - Provide specific nutrition/hydration education as appropriate  - Include patient/family/caregiver in decisions related to nutrition  Outcome: Progressing

## 2023-10-04 NOTE — PLAN OF CARE
Problem: MOBILITY - ADULT  Goal: Maintain or return to baseline ADL function  Description: INTERVENTIONS:  -  Assess patient's ability to carry out ADLs; assess patient's baseline for ADL function and identify physical deficits which impact ability to perform ADLs (bathing, care of mouth/teeth, toileting, grooming, dressing, etc.)  - Assess/evaluate cause of self-care deficits   - Assess range of motion  - Assess patient's mobility; develop plan if impaired  - Assess patient's need for assistive devices and provide as appropriate  - Encourage maximum independence but intervene and supervise when necessary  - Involve family in performance of ADLs  - Assess for home care needs following discharge   - Consider OT consult to assist with ADL evaluation and planning for discharge  - Provide patient education as appropriate  Outcome: Progressing  Goal: Maintains/Returns to pre admission functional level  Description: INTERVENTIONS:  - Perform BMAT or MOVE assessment daily.   - Set and communicate daily mobility goal to care team and patient/family/caregiver.    - Collaborate with rehabilitation services on mobility goals if consulted  - Record patient progress and toleration of activity level   Outcome: Progressing     Problem: Prexisting or High Potential for Compromised Skin Integrity  Goal: Skin integrity is maintained or improved  Description: INTERVENTIONS:  - Identify patients at risk for skin breakdown  - Assess and monitor skin integrity  - Assess and monitor nutrition and hydration status  - Monitor labs   - Assess for incontinence   - Turn and reposition patient  - Assist with mobility/ambulation  - Relieve pressure over bony prominences  - Avoid friction and shearing  - Provide appropriate hygiene as needed including keeping skin clean and dry  - Evaluate need for skin moisturizer/barrier cream  - Collaborate with interdisciplinary team   - Patient/family teaching  - Consider wound care consult   Outcome: Progressing     Problem: Nutrition/Hydration-ADULT  Goal: Nutrient/Hydration intake appropriate for improving, restoring or maintaining nutritional needs  Description: Monitor and assess patient's nutrition/hydration status for malnutrition. Collaborate with interdisciplinary team and initiate plan and interventions as ordered. Monitor patient's weight and dietary intake as ordered or per policy. Utilize nutrition screening tool and intervene as necessary. Determine patient's food preferences and provide high-protein, high-caloric foods as appropriate.      INTERVENTIONS:  - Monitor oral intake, urinary output, labs, and treatment plans  - Assess nutrition and hydration status and recommend course of action  - Evaluate amount of meals eaten  - Assist patient with eating if necessary   - Allow adequate time for meals  - Recommend/ encourage appropriate diets, oral nutritional supplements, and vitamin/mineral supplements  - Order, calculate, and assess calorie counts as needed  - Recommend, monitor, and adjust tube feedings and TPN/PPN based on assessed needs  - Assess need for intravenous fluids  - Provide specific nutrition/hydration education as appropriate  - Include patient/family/caregiver in decisions related to nutrition  Outcome: Progressing

## 2023-10-04 NOTE — PROGRESS NOTES
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 CT AP with no lymphadenopathy; Brain MRI no lesion; stage II Large B Cell Lymphoma. She started her dose-adjusted systemic treatment, R-EPOCH, on 9/22/23 with the last dose on 9/25/23. She received Rituxan infusion on 9/27/23. Her CMP and uric acid have been unremarkable for tumor lysis. Overall, patient tolerated chemotherapy and immunotherapy very well. She is also s/p IR gastrostomy tube placement on 9/27/23. Started  growth factor on 9/28/23. WBC decreased at 0.21 with absolute neutrophil count at 0.00 on 10/2/23. Commence with neutropenic precautions. Patient afebrile, no complaints of bone pain from growth factor injections since 9/28/23. Plan to continue with with filgrastim daily until [place] improves. Patient is s/p trach exchage to cuffed trach per pulmonology. Of note, SpO2 at 95%. Her WBC and ANC improving daily. She is afebrile. Denied nausea, vomiting. Denies B-symptoms. Assessment and Plan  1. 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 Started with growth factor on 9/28/23. Filgrastim (Neupogen) ordered at 5 mg/kg (375 mg) rounded to 300 mcg; will continue to watch CBC/ANC daily. 2. Left Jugular Lymphadenopathy     3. Neutropenia   -WBC 0.20 < 0.21.        ANC  0.12 < 0.02 < 0.00  -Neutropenic precautions   -S/p chemotherapy on 9/25/23, immunotherapy on 9/27/23  -Started with growth factor, Neupogen, on 9/28/23, plan to continue until [place] improves    Started systemic treatment: EPOCH-R Cycle 1 day 1-3: Etoposide, Doxorubicin, Vincristine , Cyclophosphamide , prednisone + Rituximab (23-23)    CMP  Uric acid 2.3 (10/1/23) < 2.5 () < 2.2 () < 2.4 < 2.7 < 2.8   WNL  Potassium 4.6 < 4.1 < 3.2 < 3.7 <3.4 < 3.6 < 3.4   WNL  Calcium 10.2  < 9.7 < 10.5 < 8.38 < 8.8   Total Bili 0.24 < 0.62 < 0.42 < 0.38 <0.30   WNL  Phosphorus 3.4 < 2.2 < 2.1 < 2.5  WNL  Creatinine 1.17 < 1.04 < 0.78 < 0.68   WNL  AST/ALT  33/32< 18/20 <  <   WNL    CBC  Hemoglobin 7.2 < 8.5 < 8.3 (10/2) < 9.4 () <11.4 <11.7 () < 11.2 < 10.6 < 10.9  WBC 0.45 (10/4) < 0.20 < 0.21 (10/2) < 0.20 < 8.01 < 8.67 ()11.23 < 13.95 < 9.72  Platelets 77 (67/) <03 < 68 (10/2) < 158 < 219 () < 204 < 221 < 181    Imagin23 CT PE study Abdomen/pelvis- Hilar and mediastinal lymphadenopathy similar to prior. No acute findings in the abdomen or pelvis. Chronic lytic lesion in T12 vertebral body gradually progressing since  with chronic pathologic fracture.     23 CTA Chest Rt middle lobe consolidation (PNA), slight regression of previous hilar and mediastinal LNs (had recent PNA in 2023)      23 CT Soft Tissue Neck w contrast: soft tissue mass ~ 6.1 x 4.7 x 5.2 cm appears to be centered within Lt oropharynx involving multiple spaces and extends into the nasopharynx. .. multiple small jugular chain nodes on the left.      23 nasopharynx mass biopsy initial results positive for malignancy favor poorly differentiated carcinoma. Cannot exclude lymphoma. Flow cytometry pending.      PLAN:  1) Continues with neutropenic precautions. Of note, WBC at 0.45, ANC at 0.12. slowly improving    2) Continue with growth factor 300 mcg daily until ANC improves     3) Continue with prophylaxis medications in the setting of neutropenia: allopurinol 300 mg daily, acyclovir 400 mg twice daily, fluconazole 400 mg and levofloxacin 500 mg daily     Subjective:  Patient was able to offer a weak smile as I entered the room.  She was able to whisper, "it's nice to see you." She complains of fatigue, but felt okay overall. Discussed that her WBC/ANC values are improving. Plan to hopefully get her to rehab with no further respiratory issues. She understands that she is anticipated to be back inpatient for her next cycle of chemotherapy, likely mid-October. Review of Systems   Constitutional: Positive for fatigue. Negative for chills, diaphoresis and fever. HENT: Negative for ear pain and sore throat. Neck mass    Eyes: Negative for pain and visual disturbance. Respiratory: Negative for cough, chest tightness, shortness of breath and wheezing. Cardiovascular: Negative for chest pain and palpitations. Gastrointestinal: Negative for abdominal pain, blood in stool, diarrhea, nausea and vomiting. Genitourinary: Negative for difficulty urinating, dysuria and hematuria. Musculoskeletal: Negative for arthralgias and back pain. Skin: Negative for color change and rash. Neurological: Positive for weakness. Negative for dizziness, seizures, syncope and headaches. Psychiatric/Behavioral: Negative for agitation and decreased concentration. The patient is not nervous/anxious. All other systems reviewed and are negative.     Objective:     Medication Administration - last 24 hours from 10/03/2023 1451 to 10/04/2023 1451       Date/Time Order Dose Route Action Action by     10/04/2023 0909 EDT chlorhexidine (PERIDEX) 0.12 % oral rinse 15 mL 15 mL Mouth/Throat Given Ginny Ormond, RN     10/03/2023 2114 EDT chlorhexidine (PERIDEX) 0.12 % oral rinse 15 mL 15 mL Mouth/Throat Given Mickie Eva, RN     10/04/2023 7493 EDT busPIRone (BUSPAR) tablet 30 mg 30 mg Oral Given Ginny Ormond, RN     10/03/2023 2115 EDT busPIRone (BUSPAR) tablet 30 mg 30 mg Oral Given Mickie Eva, RN     10/03/2023 1649 EDT busPIRone (BUSPAR) tablet 30 mg 30 mg Oral Given Ginny Ormond, RN     10/04/2023 1221 EDT nystatin (MYCOSTATIN) oral suspension 500,000 Units 500,000 Units Swish & Swallow Given Chappell Hawking, RN     10/04/2023 0911 EDT nystatin (MYCOSTATIN) oral suspension 500,000 Units 500,000 Units Swish & Swallow Given Chappell Hawking, RN     10/03/2023 2114 EDT nystatin (MYCOSTATIN) oral suspension 500,000 Units 500,000 Units Swish & Swallow Given Su Buckerinn, RN     10/03/2023 1715 EDT nystatin (MYCOSTATIN) oral suspension 500,000 Units 500,000 Units Swish & Swallow Given Chappell Hawking, RN     10/03/2023 1714 EDT nystatin (MYCOSTATIN) oral suspension 500,000 Units 500,000 Units Swish & Swallow Not Given Chappell Hawking, RN     10/04/2023 1406 EDT levalbuterol ([place]) inhalation solution 1.25 mg 1.25 mg Nebulization Given Abundio Salvador, RT     10/04/2023 0810 EDT levalbuterol ([place]) inhalation solution 1.25 mg 1.25 mg Nebulization Given Abundio Salvador, RT     10/04/2023 0131 EDT levalbuterol ([place]) inhalation solution 1.25 mg 1.25 mg Nebulization Given Poly Boga, RN     10/03/2023 1917 EDT levalbuterol [place]) inhalation solution 1.25 mg 1.25 mg Nebulization Given Poly Boga, RN     10/04/2023 1407 EDT ipratropium (ATROVENT) 0.02 % inhalation solution 0.5 mg 0.5 mg Nebulization Given Abundio Salvador, RT     10/04/2023 0810 EDT ipratropium (ATROVENT) 0.02 % inhalation solution 0.5 mg 0.5 mg Nebulization Given Abundio Salvador, RT     10/04/2023 0131 EDT ipratropium (ATROVENT) 0.02 % inhalation solution 0.5 mg 0.5 mg Nebulization Given Poly Boga, RN     10/03/2023 1918 EDT ipratropium (ATROVENT) 0.02 % inhalation solution 0.5 mg 0.5 mg Nebulization Given Poly Boga, RN     10/04/2023 0908 EDT gabapentin (NEURONTIN) capsule 300 mg 300 mg Oral Given Ta Rg, RN     10/03/2023 2114 EDT gabapentin (NEURONTIN) capsule 300 mg 300 mg Oral Given Maryruth Cranker, TINO     10/03/2023 1650 EDT gabapentin (NEURONTIN) capsule 300 mg 300 mg Oral Given Ta Rg, TINO     10/04/2023 0910 EDT nicotine (NICODERM CQ) 21 mg/24 hr TD 24 hr patch 21 mg 21 mg Transdermal Medication Applied Sherlyn Solange Zafar, RN     10/04/2023 6626 EDT nicotine (NICODERM CQ) 21 mg/24 hr TD 24 hr patch 21 mg 21 mg Transdermal Patch Removed Ginny Ormond, RN     10/04/2023 0910 EDT enoxaparin (LOVENOX) subcutaneous injection 40 mg 40 mg Subcutaneous Given Ginny Ormond, RN     10/04/2023 0908 EDT amLODIPine (NORVASC) tablet 10 mg 10 mg Oral Not Given Ginny Ormond, RN     10/03/2023 2116 EDT senna oral syrup 8.8 mg 8.8 mg Per NG Tube Given Mickie Atlantic, RN     10/04/2023 7420 EDT guaiFENesin (ROBITUSSIN) oral liquid 200 mg 200 mg Oral Given Ginny Ormond, RN     10/04/2023 0136 EDT guaiFENesin (ROBITUSSIN) oral liquid 200 mg 200 mg Oral Given Mickie Atlantic, RN     10/03/2023 2116 EDT guaiFENesin (ROBITUSSIN) oral liquid 200 mg 200 mg Oral Given Mickie Atlantic, RN     10/04/2023 7284 EDT famotidine (PEPCID) tablet 20 mg 20 mg Oral Given Ginny Ormond, RN     10/03/2023 1715 EDT famotidine (PEPCID) tablet 20 mg 20 mg Oral Given Ginny Ormond, RN     10/03/2023 1714 EDT famotidine (PEPCID) tablet 20 mg 20 mg Oral Not Given Ginny Ormond, RN     10/04/2023 1381 EDT allopurinol (ZYLOPRIM) tablet 300 mg 300 mg Oral Given Ginny Ormond, RN     10/04/2023 6758 EDT levofloxacin (LEVAQUIN) tablet 500 mg 500 mg Oral Given Ginny Ormond, RN     10/04/2023 4206 EDT fluconazole (DIFLUCAN) tablet 400 mg 400 mg Oral Given Ginny Ormond, RN     10/04/2023 9196 EDT acyclovir (ZOVIRAX) capsule 400 mg 400 mg Oral Given Ginny Ormond, RN     10/03/2023 1715 EDT acyclovir (ZOVIRAX) capsule 400 mg 400 mg Oral Given Ginny Ormond, RN     10/03/2023 1700 EDT acyclovir (ZOVIRAX) capsule 400 mg 400 mg Oral Not Given Ginny Ormond, TINO     10/04/2023 6827 EDT sulfamethoxazole-trimethoprim (BACTRIM DS) 800-160 mg per tablet 1 tablet 1 tablet Oral Given Ginny Ormond, TINO     10/04/2023 2571 EDT oxyCODONE (ROXICODONE) oral solution 5 mg 5 mg Oral Given Mickie Atlantic, RN     10/03/2023 2115 EDT oxyCODONE (ROXICODONE) oral solution 5 mg 5 mg Oral Given Mickie Atlantic, RN     10/04/2023 1407 EDT sodium chloride 3 % inhalation solution 4 mL 4 mL Nebulization Given Lukas Sample, RT     10/04/2023 0810 EDT sodium chloride 3 % inhalation solution 4 mL 4 mL Nebulization Given Lukas Sample, RT     10/04/2023 0131 EDT sodium chloride 3 % inhalation solution 4 mL 4 mL Nebulization Given Tim Blanton, TINO     10/03/2023 1918 EDT sodium chloride 3 % inhalation solution 4 mL 4 mL Nebulization Given Tim Blanton RN     10/04/2023 0824 EDT carvedilol (COREG) tablet 12.5 mg 12.5 mg Oral Not Given Katty Bryant RN     10/03/2023 1650 EDT carvedilol (COREG) tablet 12.5 mg 12.5 mg Oral Not Given Katty Bryant RN     10/04/2023 7932 EDT sertraline (ZOLOFT) tablet 75 mg 75 mg Per PEG Tube Given Katty Bryant RN     10/03/2023 2115 EDT QUEtiapine (SEROquel) tablet 50 mg 50 mg Oral Given Wally Marina RN     10/04/2023 0912 EDT Filgrastim-aafi (NIVESTYM) subcutaneous injection 300 mcg 300 mcg Subcutaneous Given Katty Bryant RN     10/04/2023 1317 EDT albumin human (FLEXBUMIN) 5 % injection 25 g 25 g Intravenous New Bag Katty Bryant RN          BP (!) 82/50 (BP Location: Left arm)   Pulse 91   Temp 99.4 °F (37.4 °C) (Oral)   Resp 15   Ht 5' 1" (1.549 m)   Wt 78 kg (171 lb 15.3 oz)   SpO2 95%   BMI 32.49 kg/m²       Physical Exam  Constitutional:       Appearance: She is not ill-appearing. HENT:      Head: Normocephalic and atraumatic. Comments: Trach cuff in place     Right Ear: External ear normal.      Left Ear: External ear normal.      Nose: No congestion or rhinorrhea. Mouth/Throat:      Mouth: Mucous membranes are moist.   Eyes:      Extraocular Movements: Extraocular movements intact. Conjunctiva/sclera: Conjunctivae normal.      Pupils: Pupils are equal, round, and reactive to light. Neck:      Comments: cuffed Karenley XLT #7 on 10/3/23  Cardiovascular:      Rate and Rhythm: Normal rate and regular rhythm. Pulses: Normal pulses. Heart sounds: Normal heart sounds. No murmur heard. No gallop. Pulmonary:      Effort: Pulmonary effort is normal. No respiratory distress. Breath sounds: No wheezing, rhonchi or rales. Abdominal:      General: Bowel sounds are normal. There is no distension. Palpations: There is no mass. Tenderness: There is no abdominal tenderness. There is no guarding. Comments: Peg tube in place, on tube feeding   Musculoskeletal:      Right lower leg: No edema. Left lower leg: No edema. Skin:     General: Skin is warm. Capillary Refill: Capillary refill takes less than 2 seconds. Coloration: Skin is not jaundiced or pale. Findings: Rash present. Neurological:      General: No focal deficit present. Mental Status: She is alert and oriented to person, place, and time. Psychiatric:         Mood and Affect: Mood normal.         Behavior: Behavior normal.         Thought Content:  Thought content normal.         Judgment: Judgment normal.         Recent Results (from the past 48 hour(s))   Basic metabolic panel    Collection Time: 10/03/23  4:40 AM   Result Value Ref Range    Sodium 136 135 - 147 mmol/L    Potassium 4.4 3.5 - 5.3 mmol/L    Chloride 95 (L) 96 - 108 mmol/L    CO2 35 (H) 21 - 32 mmol/L    ANION GAP 6 mmol/L    BUN 37 (H) 5 - 25 mg/dL    Creatinine 1.56 (H) 0.60 - 1.30 mg/dL    Glucose 131 65 - 140 mg/dL    Calcium 8.9 8.4 - 10.2 mg/dL    eGFR 33 ml/min/1.73sq m   CBC and differential    Collection Time: 10/03/23  4:40 AM   Result Value Ref Range    WBC 0.20 (LL) 4.31 - 10.16 Thousand/uL    RBC 2.73 (L) 3.81 - 5.12 Million/uL    Hemoglobin 8.5 (L) 11.5 - 15.4 g/dL    Hematocrit 26.8 (L) 34.8 - 46.1 %    MCV 98 82 - 98 fL    MCH 31.1 26.8 - 34.3 pg    MCHC 31.7 31.4 - 37.4 g/dL    RDW 15.6 (H) 11.6 - 15.1 %    MPV 10.4 8.9 - 12.7 fL    Platelets 61 (L) 194 - 390 Thousands/uL   Magnesium    Collection Time: 10/03/23  4:40 AM   Result Value Ref Range    Magnesium 2.0 1.9 - 2.7 mg/dL   Manual Differential(PHLEBS Do Not Order)    Collection Time: 10/03/23  4:40 AM   Result Value Ref Range    Segmented % 8 (L) 43 - 75 %    Lymphocytes % 92 (H) 14 - 44 %    Monocytes % 0 (L) 4 - 12 %    Eosinophils, % 0 0 - 6 %    Basophils % 0 0 - 1 %    Absolute Neutrophils 0.02 (L) 1.85 - 7.62 Thousand/uL    Lymphocytes Absolute 0.18 (L) 0.60 - 4.47 Thousand/uL    Monocytes Absolute 0.00 0.00 - 1.22 Thousand/uL    Eosinophils Absolute 0.00 0.00 - 0.40 Thousand/uL    Basophils Absolute 0.00 0.00 - 0.10 Thousand/uL    Total Counted      RBC Morphology Normal     Platelet Estimate Decreased (A) Adequate    Microcytes Present    CBC and differential    Collection Time: 10/04/23  4:59 AM   Result Value Ref Range    WBC 0.45 (LL) 4.31 - 10.16 Thousand/uL    RBC 2.32 (L) 3.81 - 5.12 Million/uL    Hemoglobin 7.2 (L) 11.5 - 15.4 g/dL    Hematocrit 23.0 (L) 34.8 - 46.1 %    MCV 99 (H) 82 - 98 fL    MCH 31.0 26.8 - 34.3 pg    MCHC 31.3 (L) 31.4 - 37.4 g/dL    RDW 15.8 (H) 11.6 - 15.1 %    MPV 10.4 8.9 - 12.7 fL    Platelets 77 (L) 106 - 390 Thousands/uL   Comprehensive metabolic panel    Collection Time: 10/04/23  4:59 AM   Result Value Ref Range    Sodium 135 135 - 147 mmol/L    Potassium 4.6 3.5 - 5.3 mmol/L    Chloride 95 (L) 96 - 108 mmol/L    CO2 35 (H) 21 - 32 mmol/L    ANION GAP 5 mmol/L    BUN 41 (H) 5 - 25 mg/dL    Creatinine 1.71 (H) 0.60 - 1.30 mg/dL    Glucose 140 65 - 140 mg/dL    Calcium 8.8 8.4 - 10.2 mg/dL    Corrected Calcium 10.2 (H) 8.3 - 10.1 mg/dL    AST 33 13 - 39 U/L    ALT 32 7 - 52 U/L    Alkaline Phosphatase 232 (H) 34 - 104 U/L    Total Protein 5.3 (L) 6.4 - 8.4 g/dL    Albumin 2.3 (L) 3.5 - 5.0 g/dL    Total Bilirubin 0.78 0.20 - 1.00 mg/dL    eGFR 30 ml/min/1.73sq m   Manual Differential(PHLEBS Do Not Order)    Collection Time: 10/04/23  4:59 AM   Result Value Ref Range    Segmented % 22 (L) 43 - 75 %    Bands % 4 0 - 8 %    Lymphocytes % 58 (H) 14 - 44 %    Monocytes % 16 (H) 4 - 12 %    Eosinophils, % 0 0 - 6 %    Basophils % 0 0 - 1 %    Absolute Neutrophils 0.12 (L) 1.85 - 7.62 Thousand/uL    Lymphocytes Absolute 0.26 (L) 0.60 - 4.47 Thousand/uL    Monocytes Absolute 0.07 0.00 - 1.22 Thousand/uL    Eosinophils Absolute 0.00 0.00 - 0.40 Thousand/uL    Basophils Absolute 0.00 0.00 - 0.10 Thousand/uL    Total Counted      Platelet Estimate Decreased (A) Adequate    Anisocytosis Present     Hypochromia Present    COVID/FLU/RSV    Collection Time: 10/04/23 12:20 PM    Specimen: Nasopharyngeal Swab; Nares   Result Value Ref Range    SARS-CoV-2 Negative Negative    INFLUENZA A PCR Negative Negative    INFLUENZA B PCR Negative Negative    RSV PCR Negative Negative       US right upper quadrant    Result Date: 9/22/2023  Narrative: RIGHT UPPER QUADRANT ULTRASOUND INDICATION:     CT finding N/V +Cancino. COMPARISON: CT abdomen/pelvis 9/21/2023 TECHNIQUE:   Real-time ultrasound of the right upper quadrant was performed with a curvilinear transducer with both volumetric sweeps and still imaging techniques. FINDINGS: Evaluation limited as per sonographer's note in PACS. PANCREAS:  Visualized portions of the pancreas are within normal limits. AORTA AND IVC:  Visualized portions are normal for patient age. LIVER: Size:  Within normal range. The liver measures 15.3 cm in the midclavicular line. Contour:  Surface contour is smooth. Parenchyma:  Echogenicity and echotexture are within normal limits. No liver mass identified. Limited imaging of the main portal vein shows it to be patent and hepatopetal. BILIARY: The gallbladder is normal in caliber. Gallbladder wall thickness upper limits of normal. No pericholecystic fluid. There is gallbladder sludge without evidence for shadowing calculi. No sonographic Cancino sign. No intrahepatic biliary dilatation. CBD measures 4.0 mm. No choledocholithiasis. KIDNEY: Right kidney measures 11.9 x 5.2 x 6.4 cm.  Volume 207.6 mL Several simple cysts, the largest of which measures 4.1 cm. 2.8 cm septated cyst in the upper pole. No hydronephrosis or perinephric collection. ASCITES:   None. Impression: No cholelithiasis. Gallbladder sludge is present with wall thickness upper limits of normal. Negative sonographic Cancino sign. Findings may be sequela of chronic gallbladder dysfunction. Consider follow-up with a HIDA scan if it would alter patient management. Workstation performed: [id number]     CT abdomen pelvis w contrast    Result Date: 9/21/2023  Narrative: CT ABDOMEN AND PELVIS WITH IV CONTRAST INDICATION:   Head/neck cancer, staging lymohoma staging prior to treatment with chemo. COMPARISON: 9/13/2023. TECHNIQUE:  CT examination of the abdomen and pelvis was performed. Multiplanar 2D reformatted images were created from the source data. This examination, like all CT scans performed in the [facility], was performed utilizing techniques to minimize radiation dose exposure, including the use of iterative reconstruction and automated exposure control. Radiation dose length product (DLP) for this visit:  813 mGy-cm IV Contrast:  100 mL of iohexol (OMNIPAQUE) Enteric Contrast:  Enteric contrast was not administered. FINDINGS: Mildly degraded by respiratory motion. ABDOMEN LOWER CHEST: There is bibasilar atelectasis. LIVER/BILIARY TREE:  Unremarkable. GALLBLADDER:  No calcified gallstones. There may be mild gallbladder wall thickening though evaluation is degraded by respiratory motion. No surrounding inflammatory changes. SPLEEN:  Unremarkable. PANCREAS:  Unremarkable. ADRENAL GLANDS:  Unremarkable. KIDNEYS/URETERS: Multiple bilateral renal cysts. No hydronephrosis. STOMACH AND BOWEL: There is colonic diverticulosis without evidence of acute diverticulitis. APPENDIX:  No findings to suggest appendicitis. ABDOMINOPELVIC CAVITY:  No ascites. No pneumoperitoneum. No lymphadenopathy. VESSELS:  Unremarkable for patient's age. PELVIS REPRODUCTIVE ORGANS:  Unremarkable for patient's age. URINARY BLADDER:  Unremarkable. ABDOMINAL WALL/INGUINAL REGIONS:  Fat containing right inguinal hernia noted. Fat-containing umbilical hernia. OSSEOUS STRUCTURES: Again seen is a destructive lytic lesion in the T12 vertebral body with sclerotic borders, progressed from 2013 and with chronic appearing pathologic fracture. Impression: 1. No abdominal lymphadenopathy. 2.  Question gallbladder wall thickening versus motion artifact. If there is clinical concern for gallbladder pathology, ultrasound is recommended. 3.  Chronic T12 lytic lesion with pathologic fracture. Workstation performed: [id number]     MRI soft tissue neck wo and w contrast    Result Date: 9/21/2023  Narrative: MRI OF THE SOFT TISSUES OF THE NECK - WITH AND WITHOUT CONTRAST INDICATION: Oropharyngeal mass s/p biopsy (+) for carcinoma COMPARISON: Neck CT from 9/14/2023 TECHNIQUE:  Multiplanar, multisequence imaging of the soft tissues of the neck was performed before and after gadolinium administration. . IV Contrast:  7.5 mL of Gadobutrol injection (SINGLE-DOSE) IMAGE QUALITY: Motion degrades images. FINDINGS: VISUALIZED BRAIN PARENCHYMA: No acute or suspicious findings. Please see today's brain MR report. VISUALIZED ORBITS AND PARANASAL SINUSES: Globes and orbits are within normal limits. Pan paranasal sinus mucosal thickening, greatest involving ethmoids and sphenoids. NASAL CAVITY, NASOPHARYNX, and OROHYPOPHARYNX and LARYNX: Approximately 7.5 x 4.0 x 7.8 cm (length X depth X width) soft tissue mass, epicenter likely the left lateral pharyngeal recess. Enhances and restricts diffusion. Central, irregular fluid signal intensity area without enhancement appears contiguous with fluid around an endotracheal tube, likely trapped secretions and circumferential mass. Mass extends from the left greater than right nasopharynx superiorly to the cricoid cartilage inferiorly. Extends to the posterior nasal cavity. Displaces the soft palate, uvula and tongue base anteriorly.  Oral cavity is otherwise within normal limits. Displaces the left pterygoid muscles anterolaterally. Extends posterior to the angle of the mandible approximately 1.4 cm, abutting and mildly laterally displacing the deep parotid, inseparable from the gland. Effaces the left parapharyngeal fat. Discrete epiglottis not seen, grossly anteriorly displaced. Extends along the left aryepiglottic fold and posterior pharyngeal wall to the to the inferior supraglottic airway, grossly terminating just above or at the left false cord. Extends to the left carotid space, encircles the carotid with severe narrowing and posterior-lateral displacement of the distal cervical and most proximal petrous vessel. Otherwise preserved left internal carotid flow-void. Posterior-lateral displacement  and occlusion of the internal jugular vein at the level of the angle of the mandible in the distal neck. Mass extends to the proximal jugular foramen. Laryngeal ventricles and true cords appear preserved. Normal fat signal  preserved in the cricoid and thyroid cartilages. Enteric and endotracheal tubes are displaced rightward. Trace retropharyngeal effusion. THYROID GLAND:   Within normal limits. PAROTID AND SUBMANDIBULAR GLANDS: Both submandibular and the right parotid glands are within normal limits. Deep left parotid involvement mentioned above. LYMPH NODES: Similar small jugular chain nodes more numerous on the left than the right, but none considered pathologically enlarged. 0.7 x 0.5 cm suspicious right retropharyngeal node (4/27). Left retropharyngeal space is obliterated by mass, no separate adenopathy. VASCULAR STRUCTURES: Left carotid a jugular involvement described above. Right carotid artery and jugular veins are within normal limits. THORACIC INLET: Similar to CT. Dependent lung opacities. CERVICAL SPINE: Normal appearance.  OTHER: Left greater than right mastoid effusions with patchy left enhancement and restricted diffusion were described in today's brain MR report. Asymmetric opacity involves the left petrous apex. Normal appearance of the IACs and inner ear structures. No cerebellopontine angle mass. Impression: Known, large left neck mass described in detail above. Workstation performed: [id number]     MRI brain w wo contrast    Result Date: 9/21/2023  Narrative: MRI BRAIN WITHOUT AND WITH CONTRAST INDICATION:  Oropharyngeal mass s/p biopsy (+) for carcinoma . COMPARISON: 6/17/2017 CT of the brain TECHNIQUE:  Multiplanar/multisequence MRI of the brain was performed administration of contrast. IV Contrast:  7.5 mL of Gadobutrol injection (SINGLE-DOSE) IMAGE QUALITY:  Diagnostic. FINDINGS: BRAIN PARENCHYMA: No  hemorrhage, extra-axial fluid collection, acute infarct, mass effect or midline shift. Mild, focal patchy periventricular and subcortical white matter foci of abnormal T2 and FLAIR hyperintensity are nonspecific, but usually secondary to changes of microangiopathy. Small developmental venous angioma in the left posterior frontal lobe, typically clinically insignificant. No suspicious enhancement or masses. VENTRICLES:  Within normal limits for age. SELLA AND PITUITARY GLAND:  Within normal limits. ORBITS:  Within normal limits. PARANASAL SINUSES: Mucosal thickening. VASCULATURE: Preserved skull base flow voids. Normal enhancement. CALVARIUM AND SKULL BASE: Bilateral mastoid effusions are likely secondary mass, related to eustachian tube obstruction from nasopharyngeal mass. Patient is also intubated. Small mucosal retention cyst at the right fossa of Rosenmuller. Small ill-defined foci of enhancement in the superior mastoid and asymmetric left mastoid restricted diffusion. No coalescence or discrete mass. Skull and visualized cervical spine are within normal limits. EXTRACRANIAL SOFT TISSUES:  A nasopharyngeal mass will be described in further detail on today's neck MR report. Impression: No evidence of brain metastases.  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 Asymmetric patchy enhancement and restricted diffusion in the left mastoid. The differential includes neoplastic involvement and infection. Workstation performed: [id number]     XR chest portable    Result Date: 9/20/2023  Narrative: CHEST INDICATION:   NG tube placement. COMPARISON: 9/17/2023 EXAM PERFORMED/VIEWS:  XR CHEST PORTABLE FINDINGS: Tracheostomy tube is in stable position. Distal portions of nasogastric tube are seen below the hemidiaphragm within the left upper abdomen. The tip of the tube extends beyond the inferior margin of this study. Heart shadow is enlarged but unchanged from prior exam. There is mild pulmonary vascular congestion. The left costophrenic angle is obscured. Hazy opacities are additionally noted within the right costophrenic angle. No evidence of acute osseous abnormality. Impression: 1. Nasogastric tube is seen with its distal portions within the stomach. The tip of the tube courses beyond the inferior margin of the study. 2. Pulmonary vascular congestion with obscuration of the left hemidiaphragm. This is stable from prior radiograph and may represent small left effusion versus consolidation. 3. Right basilar atelectasis versus trace right effusion. Workstation performed: [id number]     XR chest portable    Result Date: 9/18/2023  Narrative: CHEST INDICATION:   Assess. COMPARISON:  None EXAM PERFORMED/VIEWS:  XR CHEST PORTABLE Images: 2 FINDINGS: Tracheostomy tube is seen in appropriate position. A feeding tube is seen extending down below the dome of the diaphragm Cardiomegaly seen Increased lung markings seen suggest congestion left base is obscured Osseous structures appear within normal limits for patient age. Impression: Increased lung markings seen suggest congestion.  The left base is obscured No worsening Workstation performed: [id number]     CT soft tissue neck w contrast    Result Date: 9/14/2023  Narrative: CT NECK WITH CONTRAST INDICATION:   Laryngeal edema COMPARISON:  None. TECHNIQUE:  Axial, sagittal, and coronal 2D reformatted images were created from the axial source data and submitted for interpretation. Radiation dose length product (DLP) for this visit:  769 mGy-cm . This examination, like all CT scans performed in the [facility], was performed utilizing techniques to minimize radiation dose exposure, including the use of iterative reconstruction and automated exposure control. IV Contrast:  85 mL of iohexol (OMNIPAQUE) IMAGE QUALITY:  Diagnostic. FINDINGS: VISUALIZED BRAIN PARENCHYMA:  No acute intracranial pathology of the visualized brain parenchyma. VISUALIZED ORBITS: Normal visualized orbits. PARANASAL SINUSES: Normal visualized paranasal sinuses. Nasopharynx, oropharynx AND HYPOPHARYNX: There is a large heterogeneously enhancing mass identified within the neck involving multiple spaces. The origin is difficult to ascertain given the large size. This mass measures approximately 6.1 x 4.7 x 5.2 cm and appears to be centered within the left oropharynx. The mass extends superiorly into the nasopharynx left more so than right and into the posterior aspect of the nasal cavity. The mass also extends across the midline within the oropharynx and inferiorly into the left lateral oropharynx but not below the laryngeal ventricle. The mass extends laterally into the infratemporal fossa and parapharyngeal fat and is inseparable from infratemporal musculature and deep lobe of the parotid gland. There is extension into the prevertebral space where the mass is inseparable from the anterior paraspinal muscle on the left and posteriorly and laterally into the carotid space where the mass is displacing and inseparable from jugular and carotid.  The mass encases the cervical internal carotid artery just below the level of the skull base resulting in narrowing of the vessel and the mass compresses and occludes the jugular vein below the skull base. The vessel does reconstitute via collateral vessels as it  extends inferiorly within the neck. The mass extends superiorly into the skull base inseparable from the carotid canal and jugular foramen. There is severe airway compromise within the posterior oral cavity and superior oropharynx. ET tube and OG tube are present. THYROID GLAND:  Unremarkable. PAROTID AND SUBMANDIBULAR GLANDS: Normal parotid and submandibular glands other than the mass abutting the deep lobe of the left parotid gland. LYMPH NODES: Multiple small jugular chain nodes are seen on the left. VASCULAR STRUCTURES: As described above the mass partially encases the cervical internal carotid artery, narrowing the vessel and occludes the jugular vein from the skull base to the mid neck. Below this the vessel is unremarkable due to collateral reconstitution. THORACIC INLET: Posterior left upper lobe consolidation may represent atelectasis or pneumonia. Mild patchy groundglass opacity within the upper lung fields. BONY STRUCTURES: At T1-2 there is a left paramedian bridging osteophyte resulting in mild to moderate left anterior lateral canal stenosis. Impression: Large enhancing soft tissue mass identified within the left neck involving multiple spaces. This appears to be centered within the left oropharynx with extensions as described above into multiple spaces. This results in significant airway compromise. 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 Small level 3 and level 4 nodes are seen within the neck. I personally discussed this study with ICU resident on 9/14/2023 4:44 PM. Workstation performed: [id number]     Bronchoscopy    Result Date: 9/14/2023  Narrative: Table formatting from the original result was not included.  [address] [place], [place] [address] [phone] DATE OF SERVICE: 9/14/23 PHYSICIAN(S): Attending: No Staff Documented Fellow: No Staff Documented INDICATION: Acute respiratory failure with hypoxia ([address]) POST-OP DIAGNOSIS: See the impression below. PREPROCEDURE: Standard airway preparation completed per respiratory therapy protocol. Informed consent was obtained. Images reviewed prior to the procedure. A Time Out was performed. No suspicion or identified risk for TB or other airborne infectious disease; bronchoscopy procedure being performed for diagnostic purposes. PROCEDURE: Bronchoscopy DETAILS OF PROCEDURE: Patient was taken to the procedure room where a time out was performed to confirm correct patient and correct procedure. The patient was already under sedation prior to the procedure. The patient's blood pressure, ECG, heart rate, level of consciousness, respirations and oxygen were monitored throughout the procedure. The patient experienced no blood loss. The scope was introduced through the endotracheal tube. The procedure was moderately difficult due to patient intolerance and persistent coughing. In response to procedure difficulty, deeper sedation was employed. The patient tolerated the procedure well. There were no apparent adverse events. ANESTHESIA INFORMATION: ASA: ASA status not filed in the log. Anesthesia Type: Anesthesia type not filed in the log.  FINDINGS: The lower trachea, main sujata, left main stem, KARTHIK, lingula, LLL, right main stem, RUL, bronchus intermedius, RML and RLL appeared normal. Left lower lung zone with no endobronchial lesions, left upper and lingula both appear normal Right upper, right middle and right lower lobes all appeared normal with no endobronchial lesions Endotracheal tube was retracted 3 cm under direct visualization with the bronchoscope Bronchoalveolar lavage was performed x1 in the right lateral segment (RB4) with 60 mL of saline instilled and a total return of 40 mL; the fluid appeared cloudy SPECIMENS: ID Type Source Tests Collected by Time Destination 1 :  Lavage Lung, Right Middle Lobe Bronchoalveolar Lavage PULMONARY CYTOLOGY Yo Baird MD 9/14/2023 12:55 PM  A :  Bronchial Lung, Right Middle Lobe Bronchoalveolar Lavage FUNGAL CULTURE, VIRUS CULTURE, BRONCHIAL CULTURE AND GRAM STAIN, LEGIONELLA CULTURE, PNEUMOCYSTIS SMEAR BY DFA ([facility]), AFB CULTURE WITH STAIN Yo Baird MD 9/14/2023 12:57 PM       Impression: BAL of the right middle lobe Endotracheal tube was retracted under direct visualization Tongue still appears swollen, vocal cords visualized outside of the endotracheal tube with the bronchoscope, no significant edema or mass noted RECOMMENDATION:  Follow-up infectious work-up      XR chest 1 view portable    Result Date: 9/13/2023  Narrative: CHEST INDICATION:   post intubation. COMPARISON: Same day chest radiograph and subsequent same day CT chest. Chest x-ray 8/27/2023. EXAM PERFORMED/VIEWS:  XR CHEST PORTABLE FINDINGS: Endotracheal tube terminates approximately 4 cm above the sujata. Heart shadow is enlarged but unchanged from prior exam. Bibasilar airspace opacities are again demonstrated. No appreciable pneumothorax. No evidence of acute osseous abnormality. Lucent lesion within T12 is better demonstrated on same-day CT. Impression: 1. Endotracheal tube terminates 4 cm above the sujata. 2. Redemonstration of bibasilar airspace opacities. Workstation performed: [id number]     XR chest 1 view portable    Result Date: 9/13/2023  Narrative: CHEST INDICATION:   post intubation, adjusting ET Tube. COMPARISON:  None EXAM PERFORMED/VIEWS:  XR CHEST PORTABLE FINDINGS: Endotracheal tube terminates approximately 3 cm above the sujata. Cardiomediastinal silhouette appears unremarkable. Bibasilar airspace opacities are again noted. No appreciable pneumothorax or pleural effusion. No evidence of acute osseous abnormality. Lucent lesion within T12 is better demonstrated on same-day CT. Impression: 1.  Endotracheal tube terminates 3.3 cm above the sujata. 2. Bibasilar airspace opacities are again demonstrated. Workstation performed: [id number]     XR chest portable    Result Date: 9/13/2023  Narrative: CHEST INDICATION:   sob. COMPARISON: 8/27/2023. Subsequent CT chest performed the same day. EXAM PERFORMED/VIEWS:  XR CHEST PORTABLE FINDINGS: Heart shadow is enlarged but unchanged from prior exam. Hazy opacities are noted within the right lung base, possibly atelectasis versus pneumonia. Left basilar opacity is similar to prior radiograph. No appreciable pneumothorax. No evidence of acute osseous abnormality. Cystic lesion within the T12 vertebral body is better characterized on same-day CT. Impression: 1. Hazy bibasilar airspace opacities which may represent atelectasis versus pneumonia. Left basilar opacity is similar to recent radiograph while right basilar opacity is new Workstation performed: [id number]     CTA ED chest PE Study    Result Date: 9/13/2023  Narrative: CTA - CHEST WITH IV CONTRAST - PULMONARY ANGIOGRAM INDICATION:   R/O PE. Reports shortness of breath since being discharged from the hospital 3 weeks ago for pneumonia. Fatigue. Productive cough with white sputum. Angioedema. COMPARISON: Chest radiograph 9/13/2023, chest CT 8/25/2023, thoracic spine CT 11/13/2013. TECHNIQUE: CT angiogram timed for optimal opacification of the pulmonary arteries. Axial, sagittal, and coronal 2D reformats created from source data. Coronal 3D MIP postprocessing on the acquisition scanner. Radiation dose length product (DLP):  472 mGy-cm . Radiation dose exposure minimized using iterative reconstruction and automated exposure control. IV Contrast:  85 mL of iohexol (OMNIPAQUE) FINDINGS: PULMONARY ARTERIES:  No pulmonary embolus. Moderate pulmonary artery enlargement. LUNGS: Mild patchy new consolidation in the medial segment right middle lobe. Improving opacity in the left upper lobe with mild residual atelectasis/scar.  Redemonstration of mild dependent atelectasis in both lower lobes. Severe emphysema. AIRWAYS: No significant filling defects. ET tube 4 cm above the sujata. PLEURA:  Unremarkable. HEART/GREAT VESSELS: Moderate cardiomegaly. MEDIASTINUM AND PRASANTH: Slight regression of hilar and mediastinal lymphadenopathy. The largest node was previously 2.5 x 2.0 cm in the right infrahilar region and is now 1.9 x 1.3 cm (501/94). CHEST WALL AND LOWER NECK: Unremarkable. UPPER ABDOMEN: Right renal cysts. OSSEOUS STRUCTURES: Redemonstration of the large cystic lesion with sclerotic margins in T12 with destruction of the superior and inferior endplates, present as a much smaller thin-walled cystic lesion on the thoracic spine CT from 2013, imaged on a thoracic spine MRI from 2020. Impression: No pulmonary embolus. Patchy consolidation right middle lobe, new from 8/25/2023, compatible with pneumonia. Slight regression of previous hilar and mediastinal lymphadenopathy. Improving left upper lobe opacity which may have been due to pneumonia with residual atelectasis/scar. Persistent partial atelectasis of the lower lobes. 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 Workstation performed: [id number]     Echo complete w/ contrast if indicated    Result Date: 8/28/2023  Narrative: •  Left Ventricle: Left ventricular cavity size is small. Wall thickness is increased. There is severe concentric hypertrophy. The left ventricular ejection fraction is 60%. Systolic function is normal. Wall motion is normal. Diastolic function is mildly abnormal, consistent with grade I (abnormal) relaxation. There is no LV dynamic obstruction at rest. •  Right Ventricle: Right ventricular cavity size is normal. Systolic function is normal. •  Left Atrium: The atrium is mildly dilated. •  Mitral Valve: There is mild to moderate regurgitation.  There is systolic anterior motion of the chordal apparatus with late peaking gradient 29 mmHg during Valsalva maneuver. •  Pericardium: There is a trivial pericardial effusion along the right atrial free wall. XR chest portable ICU    Result Date: 8/28/2023  Narrative: CHEST INDICATION:   Acute hypoxic respiratory failure. COMPARISON: 8/25/2023 EXAM PERFORMED/VIEWS:  XR CHEST PORTABLE ICU FINDINGS: Heart shadow is enlarged but unchanged from prior exam. There is stable left-sided volume loss secondary to left basilar atelectasis. No pneumothorax or pleural effusion. Osseous structures appear within normal limits for patient age. Impression: Stable volume loss on the left secondary to left lower lobe atelectasis. Workstation performed: [id number]     VAS lower limb venous duplex study, complete bilateral    Result Date: 8/27/2023  Narrative:  [facility] REPORT CLINICAL: Indications: Patient presents with bilateral lower extremity pain x 1 days. Operative History: cardiac surgery-date unknown. Risk Factors The patient has history of HTN and CKD. She has no history of Hyperlipidemia. CONCLUSION:  Impression: RIGHT LOWER LIMB: No evidence of acute or chronic deep vein thrombosis. No evidence of superficial thrombophlebitis noted. Doppler evaluation shows a normal response to augmentation maneuvers. . Popliteal, posterior tibial and anterior tibial arterial Doppler waveform's are triphasic. LEFT LOWER LIMB: No evidence of acute or chronic deep vein thrombosis. No evidence of superficial thrombophlebitis noted. Doppler evaluation shows a normal response to augmentation maneuvers. Popliteal, posterior tibial and anterior tibial arterial Doppler waveform's are triphasic. SIGNATURE: Electronically Signed by: Laurel Goldberg on 2023-08-27 08:12:52 PM    CTA ED chest PE Study    Result Date: 8/25/2023  Narrative: CTA - CHEST WITH IV CONTRAST - PULMONARY ANGIOGRAM INDICATION:   Increased SOB, recent COVID diagnosis.  COMPARISON: MRI from 3/18/2020 as well as bone scan from 7/2/2019 and thoracic spine from 11/13/2013 TECHNIQUE: CTA examination of the chest was performed using angiographic technique according to a protocol specifically tailored to evaluate for pulmonary embolism. Multiplanar 2D reformatted images were created from the source data. In addition, coronal 3D MIP postprocessing was performed on the acquisition scanner. The study is limited by some respiratory motion artifacts especially in the lower lobes on the left where there is crowding of vessels due to atelectatic changes. Radiation dose length product (DLP) for this visit:  370 mGy-cm . This examination, like all CT scans performed in the [facility], was performed utilizing techniques to minimize radiation dose exposure, including the use of iterative reconstruction and automated exposure control. IV Contrast:  80 mL of iohexol (OMNIPAQUE) FINDINGS: PULMONARY ARTERIAL TREE: Limited visualization left lower lobe however there is suspicion for a small embolus in the posterobasal segment on axial image 139, series 305 and coronal image 142. No definite filling defect is seen elsewhere. The main pulmonary artery is significantly dilated at 4.2 cm. The RV LV ratio is elevated at 1.1 cm. The primary pulmonary vascular stent minutes are also dilated chronicity is uncertain. LUNGS: Emphysema is noted with blebs at the apices. There is peripheral consolidation in the left upper lobe. Air bronchogram is not well seen and there is mucous occlusion of the left mainstem bronchus with volume loss of the left upper lobe as well as  left lower lobe to a lesser extent. Some aeration in the left lower lobe is still visible. PLEURA:  Unremarkable. HEART/GREAT VESSELS:  Unremarkable for patient's age. No thoracic aortic aneurysm. MEDIASTINUM AND PRASANTH: 10 mm pretracheal node is identified. 9 mm superior mediastinal node is identified. 10 mm prevascular node is identified. Subcarinal node measures 1.5 cm. Hilar adenopathy is also demonstrated. Right hilar node is larger measuring 1.8 cm in short axis on image 138. CHEST WALL AND LOWER NECK:   Unremarkable. VISUALIZED STRUCTURES IN THE UPPER ABDOMEN: Low-density cyst is seen in the right kidney. . OSSEOUS STRUCTURES: There appears to be a destructive lesion involving the T12 vertebral body eroding both endplates and much of the vertebral body this does not appear to represent a Schmorl's node. A cystic lesion was noted in 2013 at this location however the current lesion is larger with loss of endplates. MRI also imaged this lesion in 2020 the lesion appears somewhat larger on the current examination however. Impression: Limited study. There is equivocal embolus in the posterior basal segment left lower lobe. Other smaller segments are difficult to assess due to motion and vascular crowding. There is mucous plugging in the left mainstem bronchus with volume loss in portions of the left lower lobe and left upper lobe. Aspiration pneumonia is not excluded. There is dilatation of the main pulmonary artery and proximal pulmonary segment suggesting pulmonary hypertension this is more likely chronic than acute given the degree of distention. Emphysema is present. There is significant mediastinal and hilar adenopathy suggesting underlying neoplasm more likely than simple reactive nodes. Enlarging lesion at T12 is again demonstrated of uncertain etiology. This has been present since 2013. Perhaps a plasmacytoma is a consideration. Neoplastic work-up is advised. Pulmonology consultation is also advised to assess endobronchial obstruction on the left. This was discussed with resident physician Dr. Danna Killian at 4:58 p.m.  Follow-up was marked in the epic system Workstation performed: [id number]     XR chest 1 view portable    Result Date: 8/25/2023  Narrative: CHEST INDICATION:   SOB. COMPARISON: 8/21/2021 EXAM PERFORMED/VIEWS:  XR CHEST PORTABLE Single view FINDINGS: Development of CHF. Probable left basal infiltrate. Cardiomediastinal silhouette has become more enlarged. No pneumothorax or pleural effusion. Osseous structures appear within normal limits for patient age. Impression: Increased cardiomegaly. Development of CHF. Probable left basal infiltrate Workstation performed: [id number]       I have personally reviewed labs, imaging studies, and pertinent reports. This note has been generated by voice recognition software system. Therefore, there may be spelling, grammar, and or syntax errors. Please contact if questions arise.      Amanda Ortiz,    Hematology and Medical Oncology - PGY [place]

## 2023-10-04 NOTE — PROGRESS NOTES
Patient's pathology returned as Diffuse large B-cell lymphoma. The H/N navigation team will not be following at this time.

## 2023-10-04 NOTE — PLAN OF CARE
Problem: PHYSICAL THERAPY ADULT  Goal: Performs mobility at highest level of function for planned discharge setting. See evaluation for individualized goals. Description: Treatment/Interventions: Functional transfer training, LE strengthening/ROM, Elevations, Therapeutic exercise, Endurance training, Patient/family training, Equipment eval/education, Bed mobility, Gait training, Spoke to nursing, Spoke to case management          See flowsheet documentation for full assessment, interventions and recommendations. 10/4/2023 1137 by Matt Dubon PT  Outcome: Not Progressing 2/2 to multiple medical issues, prolonged hospitalization, prolonged immobility  Note: Prognosis: Fair  Problem List: Decreased strength, Decreased range of motion, Decreased endurance, Impaired balance, Decreased mobility, Decreased coordination, Decreased cognition, Impaired judgement, Decreased safety awareness, Decreased skin integrity  Assessment: Patient seen for PT reevaluation and treatment this a.m. patient is noted with improved participation in lower extremity exercises compared to previous PT treatment session, however, participates approximately 25% of the time. Due to prolonged hospitalization and immobilization as well as medical status, patient is presenting with minimal decrease in strength to lower extremities, a decrease in functional mobility/bed mobility, decreased transfers and ambulation status from PT initial evaluation. While admitted, patient will continue to benefit from skilled physical therapy services to prevent further loss of strength and active range of motion, and to improve functional mobility, balance, endurance, transfers and gait as tolerated/medically able. When medically appropriate for discharge, patient is appropriate for postacute rehab services. Physical therapy goals updated this PT session.   Barriers to Discharge: Inaccessible home environment, Decreased caregiver support (Pt is at home alone during the day; + PAUL her home)     PT Discharge Recommendation: Post acute rehabilitation services    See flowsheet documentation for full assessment. 10/4/2023 1137 by Delores Segundo PT  Note: Prognosis: Fair  Problem List: Decreased strength, Decreased range of motion, Decreased endurance, Impaired balance, Decreased mobility, Decreased coordination, Decreased cognition, Impaired judgement, Decreased safety awareness, Decreased skin integrity  Assessment: Patient seen for PT reevaluation and treatment this a.m. patient is noted with improved participation in lower extremity exercises compared to previous PT treatment session, however, participates approximately 25% of the time. Due to prolonged hospitalization and immobilization as well as medical status, patient is presenting with minimal decrease in strength to lower extremities, a decrease in functional mobility/bed mobility, decreased transfers and ambulation status from PT initial evaluation. While admitted, patient will continue to benefit from skilled physical therapy services to prevent further loss of strength and active range of motion, and to improve functional mobility, balance, endurance, transfers and gait as tolerated/medically able. When medically appropriate for discharge, patient is appropriate for postacute rehab services. Physical therapy goals updated this PT session. Barriers to Discharge: Inaccessible home environment, Decreased caregiver support (Pt is at home alone during the day; + PAUL her home)     PT Discharge Recommendation: Post acute rehabilitation services    See flowsheet documentation for full assessment.

## 2023-10-04 NOTE — SOCIAL WORK
Palliative LSW saw patient at the bedside today. LSW appreciates the opportunity to provide patient/family with inpatient emotional support and guidance while patient continues to receive medical attention from the medical team.     Topics discussed: Met with pt at bedside for continued support. Pt minimally engaged when asked questions during conversation today. She kept her eyes closed for majority of conversation. Pt reports some pain around her trach site and also briefly held upper abdomen during conversation. She was able to state that she is continuing to find her daily prayer book and visits from hospital  helpful. She continues to be well-supported by her family who visit regularly. Areas that need follow-up: Emotional Support  Resources given: None  Others present: None      I have spent 15 minutes with Patient today in which greater than 50% of this time was spent in counseling/coordination of care regarding Counseling / Coordination of care.        LSW will continue to follow as requested by the medical team, patient, or family

## 2023-10-04 NOTE — PROGRESS NOTES
[facility]  Progress Note: Critical Care  Name: Regis Jaeger 69 y.o. female I MRN: [id number]  Unit/Bed#: ICU 03 I Date of Admission: 9/13/2023   Date of Service: 10/4/2023 I Hospital Day: 21    Assessment/Plan   * Acute respiratory failure with hypoxia secondary to oropharyngeal mass  Assessment & Plan  Cuffless trach in place since 9/17. Oxygen requirements increased. Patient appears to be quite depressed with trach in place. For mental health patient is on buspirone, quetiapine, and sertraline. For pain, gabapentin, oxycodone scheduled and prn, and hydromorphone prn. On Guaifenesin, Atrovent and Xopenex for airway maintenance. CXR 10/1 lungs not significantly changed from prior CT: Cardiomegaly, vascular congestion, bibasilar atelectasis, pleural effusion L>R. Transitioned on 10/3 to cuffed trach, which appears to be helping but CXR unchanged. 10/6/65% satting 93%     Plan:  • Attempt to wean O2 requirements  • Can trial with valve during day but must be cuffed overnight    Large cell lymphoma ([address])  Assessment & Plan  Biopsy on 9/17: Large B-cell lymphoma, germinal center B-cell type, c-MYC and BCL-2 double expression. Ki-67 proliferation index is up to 90%; FISH & NGS pending. Staging work-up: Currently stage II. First inpatient chemotherapy 9/22/2023- with R-EPOCH. 9/27 Rituxan. 9/28 Filgrastim. On acyclovir, Zyloprim, Diflucan, Levaquin, Zofran, Bactrim for chemo prophylaxis, will continue until ANC > 500. Currently Neutropenic with ANC 0->0.12 and WBC 0.45. BCx NG72, Sputum Cx and gram stain no bacteria.  Spiked fever up to 100.7 yesterday, no other signs of infection may need to draw additional blood cultures    Plan:  · Inpatient hematology oncology following  · Outpatient follow-up with hematology oncology  · Monitor ANC on CBC w Diff  · Monitor CMP for tumor lysis syndrome  · Repeat BCx    (HFpEF) heart failure with preserved ejection fraction [facility])  Assessment & Plan  Wt Readings from Last 3 Encounters:   10/04/23 78 kg (171 lb 15.3 oz)   09/07/23 74.6 kg (164 lb 6.4 oz)   08/30/23 73.3 kg (161 lb 9.6 oz)     Lab Results   Component Value Date    LVEF 60 08/28/2023     (H) 09/29/2023     (H) 09/13/2023     Echo 8/28/23: LVEF 60%. CXR 9/22: Persistent pulmonary venous congestion with small effusions and bibasilar atelectasis. Patient has not responded well to diuresis with Lasix 40, but has low albumin. Would likely respond better to Bumex. Patient received 2 doses of IV Bumex 2 g 2 days ago without significant change with urine output, but Cr increased by more than 0.3 meeting criteria for an PO. Despite holding diuresis, Cr increased by another 0.15.       Plan:  • BMP, magnesium; Goal Mg > 2 and K > 4; Replete prn  • HOB > 30°, Daily standing weights, Measure I/O  • Bladder scan        Depression  Assessment & Plan  Patient on Zoloft 75 daily and Seroquel evening. Patient is depressed, will hold off on additional palliative goals of care discussion for now as appears to be linked to her frustrations with vent limiting mobility and communication. Generalized abdominal pain  Assessment & Plan  Patient reports abdominal pain is mainly in the epigastric area. Intermittently with coughing. Takes Famotidine for GERD at home. Alk phos 10/2 145, today 232. One potential dark stool yesterday in the setting of hg 8.5 drop to 7.2, will monitor closely    - Continue Famotidine  - On bowel regimen with Senna and Miralax prn    Chronic Superficial thrombophlebitis  Assessment & Plan  9/23: Patient C/o right forearm soreness. Bilateral upper extremity ultrasound was performed in setting of high risk of DVT. RIGHT UPPER LIMB U/S: No evidence of acute or chronic deep vein thrombosis.  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     -Continue DVT ppx with Lovenox    Blister (nonthermal) of left forearm, sequela  Assessment & Plan  9/17/23: Blisters of LUE noted, no signs of infection, healing well    · Daily wound care    Oropharyngeal mass  Assessment & Plan  9/14 Neck/ soft tissue CT: Large enhancing soft tissue mass identified within the left neck involving multiple spaces. Centered within the left oropharynx with extensions as described above into multiple spaces. This results in significant airway compromise. 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 Small level 3 and level 4 nodes are seen within the neck. Tracheostomy by ENT, bx & addition testing performed. Replaced on 9/26. H/o tobacco abuse, daily smoker. On nicotine while in patient    Plan:  · ENT and Med onc following  · See acute respiratory failure and large cell lymphoma above      Angioedema  Assessment & Plan  POA with acute respiratory failure, SOB. On physical in [place] [facility] ED: Swollen tongue, swelling soft and hard palate and almost the head of all phalanx is completely closed. Severe angioedema. Decadron 10 mg, Benadryl 25 mg given. Initially refused but eventually agreed with intubation. Prior episode of angioedema in 2020 noted. Suspected coadministration of increase the dose of tramadol and lisinopril. Per daughter, there is no recent change in medications except Trelegy inhaler, cefdinir. Etiology: more likely 2/2 oropharyngeal mass compression. Plan:  • Wili Davidson placed 9/17, transitioned from cuffless to cuffed 10/3  • Tolerating trach without issue  • See acute respiratory failure with hypoxia secondary to oropharyngeal mass above    Ambulatory dysfunction  Assessment & Plan  Impacted by chronic pain syndrome, but medication regimen for this may also contribute. At present patient requires tracheostomy and ICU level care. Will require PT/ OT eval before discharge.     CKD (chronic kidney disease) stage 3, GFR 30-59 ml/min [facility])  Assessment & Plan  Lab Results   Component Value Date    EGFR 30 10/04/2023    EGFR 33 10/03/2023    EGFR 54 10/02/2023    CREATININE 1.71 (H) 10/04/2023 CREATININE 1.56 (H) 10/03/2023    CREATININE 1.04 10/02/2023     Baseline Cr appears to be between 0.75-1.1. Patient received 2 doses of Bumex IV 2 g yesterday as she had not been responding appropriately to IV 40 Lasix daily. Creatinine went up by 0.5 meeting criteria for an PO. This may be prerenal intrinsic or postrenal.  Potential prerenal causes include reduced kidney perfusion due to lisinopril. Intrinsic can also be lisinopril due to ATN, AIN from chemo medications, TLS, crystaline nephropathy from acyclovir, rituxan nephrotoxicity, filgrastim glomerulonephritis. Post renal obstructive could be from urine retention from vincristine, cyclophosphamide bladder fibrosis. Suspect most likely due to medications as a combination of prerenal and intrinsic. Cr up to 1.71 today    - Hold on Diuresis and Lisinopril  - Repeat bladder scan  - Order UA and FeNa    Pain syndrome, chronic  Assessment & Plan  Home regimen: Oxycodone 5 mg twice daily as needed. Tylenol 650 mg every 8 hours as needed. Gabapentin 600 mg 3 times daily. Medical marijuana. Pain mostly from lumbar radiculopathy. Impaired ambulatory dysfunction second to pain. Patient is taking opioids regularly for pain, has bowel regimen and is having bowel movements daily. Plan:  · Continue gabapentin 300 mg 3 times daily, scheduled oxycodone 5 mg 3 times daily, as needed Tylenol 650 mg every 6 hours. Benign essential hypertension  Assessment & Plan  On Coreg 12.5 mg BID, Amlodipine 10 mg daily. Lisinopril 40 mg daily. Plan:  · PRN hydralazine if BP >160.   · Given PO holding lisinopril      Hyperlipidemia-resolved as of 10/2/2023  Assessment & Plan  Lab Results   Component Value Date    CHOLESTEROL 203 (H) 01/24/2023    CHOLESTEROL 177 08/11/2022    CHOLESTEROL 184 07/08/2020     Lab Results   Component Value Date    HDL 45 (L) 01/24/2023    HDL 37 (L) 08/11/2022    HDL 44 (L) 07/08/2020     Lab Results   Component Value Date    TRIG 166 (H) 09/16/2023    TRIG 160 (H) 01/24/2023    TRIG 108 08/11/2022     Lab Results   Component Value Date    NONHDLC 146 07/12/2018    [address] (H) 04/29/2013     Continue outpatient diet and lifestyle modification. ICU Core Measures     A: Assess, Prevent, and Manage Pain · Has pain been assessed? Yes  · Need for changes to pain regimen? No   B: Both SAT/SAT  · N/A   C: Choice of Sedation · RASS Goal: 0 Alert and Calm  · Need for changes to sedation or analgesia regimen? No   D: Delirium · CAM-ICU: Negative   E: Early Mobility  · Plan for early mobility? No   F: Family Engagement · Plan for family engagement today? No       Antibiotic Review: Continue broad spectrum secondary to severity of illness. Review of Invasive Devices:            Prophylaxis:  VTE VTE covered by:  enoxaparin, Subcutaneous, 40 mg at 10/03/23 9570       Stress Ulcer  covered byfamotidine (PEPCID) oral suspension 40 mg [[id number]], pantoprazole (PROTONIX) injection 40 mg [[id number]]          Subjective   HPI/24hr events:   Patient breathing well on cuffed trach. She notes some nausea, and is struggling with communicating via the clipboard. She endorses one potentially dark stool yesterday which is not normal for her. Review of Systems   Constitutional: Positive for fever. Respiratory: Negative for cough and shortness of breath. Cardiovascular: Negative for chest pain and leg swelling. Gastrointestinal: Positive for abdominal pain, blood in stool and nausea.           Objective                            Vitals I/O      Most Recent Min/Max in 24hrs   Temp (!) 100.7 °F (38.2 °C) Temp  Min: 99.4 °F (37.4 °C)  Max: 100.7 °F (38.2 °C)   Pulse 94 Pulse  Min: 78  Max: 100   Resp (!) 25 Resp  Min: 12  Max: 182   /55 BP  Min: 94/52  Max: 141/66   O2 Sat 93 % SpO2  Min: 85 %  Max: 99 %      Intake/Output Summary (Last 24 hours) at 10/4/2023 0752  Last data filed at 10/4/2023 0501  Gross per 24 hour   Intake 2271 ml   Output 380 ml   Net 1891 ml         Diet Enteral/Parenteral; Tube Feeding No Oral Diet; Jevity 1.5; Continuous; 20; Every 4 hours     Invasive Monitoring Physical exam    Physical Exam  Constitutional:       Appearance: Normal appearance. She is not ill-appearing. HENT:      Head: Normocephalic and atraumatic. Eyes:      Extraocular Movements: Extraocular movements intact. Cardiovascular:      Rate and Rhythm: Normal rate and regular rhythm. Heart sounds: Normal heart sounds. Pulmonary:      Effort: Pulmonary effort is normal. No respiratory distress. Breath sounds: Normal breath sounds. No rales. Comments: Not coughing with cuffed trach  Abdominal:      Palpations: Abdomen is soft. Tenderness: There is abdominal tenderness. There is no guarding. Musculoskeletal:      Right lower leg: No edema. Left lower leg: No edema. Skin:     Coloration: Skin is not jaundiced. Neurological:      Mental Status: She is alert.    Psychiatric:      Comments: Depressed, frustrated           Diagnostic Studies      Imaging: CXR 10/4 read pending but appears unchanged from 10/1 I have personally reviewed pertinent films in PACS     Medications:  Scheduled PRN   acyclovir, 400 mg, BID  allopurinol, 300 mg, Daily  amLODIPine, 10 mg, Daily  busPIRone, 30 mg, TID  carvedilol, 12.5 mg, BID With Meals  chlorhexidine, 15 mL, Q12H ASHLEY  enoxaparin, 40 mg, Q24H [address]  famotidine, 20 mg, BID  Filgrastim-aafi, 300 mcg, Daily  fluconazole, 400 mg, Daily  gabapentin, 300 mg, TID  guaiFENesin, 200 mg, Q6H  levalbuterol, 1.25 mg, Q6H   And  ipratropium, 0.5 mg, Q6H  levofloxacin, 500 mg, Q24H [address]  nicotine, 21 mg, Daily  nystatin, 500,000 Units, 4x Daily  oxyCODONE, 5 mg, Q8H  QUEtiapine, 50 mg, HS  senna, 8.8 mg, HS  sertraline, 75 mg, Daily  sodium chloride, 4 mL, Q6H  sulfamethoxazole-trimethoprim, 1 tablet, Once per day on Mon Wed Fri      acetaminophen, 650 mg, Q6H PRN  alteplase, 2 mg, Q1MIN PRN  hydrALAZINE, 10 mg, Q4H PRN  HYDROmorphone, 0.2 mg, Q6H PRN  levalbuterol, 1.25 mg, Q8H PRN  lidocaine 1% buffered, , PRN  ondansetron, 4 mg, Q8H PRN  oxyCODONE, 2.5 mg, Q4H PRN   Or  oxyCODONE, 5 mg, Q4H PRN  polyethylene glycol, 17 g, Daily PRN       Continuous          Labs:    CBC    Recent Labs     10/03/23  0440 10/04/23  0459   WBC 0.20* 0.45*   HGB 8.5* 7.2*   HCT 26.8* 23.0*   PLT 61* 77*   BANDSPCT  --  4     BMP    Recent Labs     10/03/23  0440 10/04/23  0459   SODIUM 136 135   K 4.4 4.6   CL 95* 95*   CO2 35* 35*   AGAP 6 5   BUN 37* 41*   CREATININE 1.56* 1.71*   CALCIUM 8.9 8.8       Coags    No recent results     Additional Electrolytes  Recent Labs     10/03/23  0440   MG 2.0          Blood Gas    No recent results  No recent results LFTs  Recent Labs     10/04/23  0459   ALT 32   AST 33   ALKPHOS 232*   ALB 2.3*   TBILI 0.78       Infectious  No recent results  Glucose  Recent Labs     10/03/23  0440 10/04/23  0459   GLUC 131 140                   Jeremi Lamar MD

## 2023-10-04 NOTE — CASE MANAGEMENT
Case Management Progress Note    Patient name Joseph Mcgarry  Location ICU 03/ICU 03 MRN [id number]  : 1955 Date 10/4/2023       LOS (days): 21  Geometric Mean LOS (GMLOS) (days): 24.20  Days to GMLOS:3.3        OBJECTIVE:        Current admission status: Inpatient  Preferred Pharmacy:   [id number] [place] - [address]. [address] [place] [address]  Phone: [phone] Fax: [phone] [employer] ([facility]) - [facility], [address] [place] [address] [address] [facility]  [address] [facility] [address]  Phone: [phone] Fax: [phone]    Primary Care Provider: Soha Stephen MD    Primary Insurance: [employer] [facility] REP  Secondary Insurance: [facility]    PROGRESS NOTE:    Weekly Care Management Length of Stay Review     Current LOS: 21 Days    Most Recent Labs:     Lab Results   Component Value Date/Time    WBC 0.45 (LL) 10/04/2023 04:59 AM    HGB 7.2 (L) 10/04/2023 04:59 AM    HCT 23.0 (L) 10/04/2023 04:59 AM    PLT 77 (L) 10/04/2023 04:59 AM    BANDSPCT 4 10/04/2023 04:59 AM    SODIUM 135 10/04/2023 04:59 AM    K 4.6 10/04/2023 04:59 AM    CL 95 (L) 10/04/2023 04:59 AM    CO2 35 (H) 10/04/2023 04:59 AM    BUN 41 (H) 10/04/2023 04:59 AM    CREATININE 1.71 (H) 10/04/2023 04:59 AM    GLUC 140 10/04/2023 04:59 AM    ALKPHOS 232 (H) 10/04/2023 04:59 AM    ALT 32 10/04/2023 04:59 AM    AST 33 10/04/2023 04:59 AM    ALB 2.3 (L) 10/04/2023 04:59 AM    TBILI 0.78 10/04/2023 04:59 AM       Most Recent Vitals:   Vitals:    10/04/23 1410   BP:    Pulse:    Resp:    Temp:    SpO2: 95%        Identified Barriers to Discharge/Discharge Goals/Care Management Interventions:     Transitioned to cuff trach, nauseated, therapy evaled. Intended Discharge Disposition: patient is being recommended for rehab.      Expected Discharge Date: tbd

## 2023-10-05 LAB
ABO GROUP BLD: NORMAL
ALBUMIN SERPL BCP-MCNC: 2.6 G/DL (ref 3.5–5)
ALP SERPL-CCNC: 284 U/L (ref 34–104)
ALT SERPL W P-5'-P-CCNC: 37 U/L (ref 7–52)
ANION GAP SERPL CALCULATED.3IONS-SCNC: 7 MMOL/L
ANISOCYTOSIS BLD QL SMEAR: PRESENT
AST SERPL W P-5'-P-CCNC: 34 U/L (ref 13–39)
BASOPHILS # BLD MANUAL: 0 THOUSAND/UL (ref 0–0.1)
BASOPHILS NFR MAR MANUAL: 0 % (ref 0–1)
BILIRUB SERPL-MCNC: 0.77 MG/DL (ref 0.2–1)
BLD GP AB SCN SERPL QL: NEGATIVE
BUN SERPL-MCNC: 49 MG/DL (ref 5–25)
CALCIUM ALBUM COR SERPL-MCNC: 9.7 MG/DL (ref 8.3–10.1)
CALCIUM SERPL-MCNC: 8.6 MG/DL (ref 8.4–10.2)
CHLORIDE SERPL-SCNC: 94 MMOL/L (ref 96–108)
CO2 SERPL-SCNC: 33 MMOL/L (ref 21–32)
CREAT SERPL-MCNC: 2 MG/DL (ref 0.6–1.3)
EOSINOPHIL # BLD MANUAL: 0 THOUSAND/UL (ref 0–0.4)
EOSINOPHIL NFR BLD MANUAL: 0 % (ref 0–6)
EOSINOPHIL NFR URNS MANUAL: 0 %
ERYTHROCYTE [DISTWIDTH] IN BLOOD BY AUTOMATED COUNT: 16 % (ref 11.6–15.1)
GFR SERPL CREATININE-BSD FRML MDRD: 24 ML/MIN/1.73SQ M
GLUCOSE SERPL-MCNC: 150 MG/DL (ref 65–140)
HCT VFR BLD AUTO: 22.6 % (ref 34.8–46.1)
HCT VFR BLD AUTO: 23.4 % (ref 34.8–46.1)
HGB BLD-MCNC: 7 G/DL (ref 11.5–15.4)
HGB BLD-MCNC: 7.6 G/DL (ref 11.5–15.4)
LYMPHOCYTES # BLD AUTO: 0.41 THOUSAND/UL (ref 0.6–4.47)
LYMPHOCYTES # BLD AUTO: 24 % (ref 14–44)
MAGNESIUM SERPL-MCNC: 1.9 MG/DL (ref 1.9–2.7)
MCH RBC QN AUTO: 30.7 PG (ref 26.8–34.3)
MCHC RBC AUTO-ENTMCNC: 31 G/DL (ref 31.4–37.4)
MCV RBC AUTO: 99 FL (ref 82–98)
METAMYELOCYTES NFR BLD MANUAL: 4 % (ref 0–1)
MONOCYTES # BLD AUTO: 0.22 THOUSAND/UL (ref 0–1.22)
MONOCYTES NFR BLD: 13 % (ref 4–12)
MYELOCYTES NFR BLD MANUAL: 4 % (ref 0–1)
NEUTROPHILS # BLD MANUAL: 0.94 THOUSAND/UL (ref 1.85–7.62)
NEUTS BAND NFR BLD MANUAL: 8 % (ref 0–8)
NEUTS SEG NFR BLD AUTO: 47 % (ref 43–75)
NRBC BLD AUTO-RTO: 3 /100 WBC (ref 0–2)
PHOSPHATE SERPL-MCNC: 2.9 MG/DL (ref 2.3–4.1)
PLATELET # BLD AUTO: 109 THOUSANDS/UL (ref 149–390)
PLATELET BLD QL SMEAR: ADEQUATE
PMV BLD AUTO: 10.5 FL (ref 8.9–12.7)
POTASSIUM SERPL-SCNC: 4.6 MMOL/L (ref 3.5–5.3)
PROT SERPL-MCNC: 5.6 G/DL (ref 6.4–8.4)
RBC # BLD AUTO: 2.28 MILLION/UL (ref 3.81–5.12)
RBC MORPH BLD: PRESENT
RH BLD: POSITIVE
SODIUM SERPL-SCNC: 134 MMOL/L (ref 135–147)
SPECIMEN EXPIRATION DATE: NORMAL
WBC # BLD AUTO: 1.71 THOUSAND/UL (ref 4.31–10.16)

## 2023-10-05 PROCEDURE — 94640 AIRWAY INHALATION TREATMENT: CPT

## 2023-10-05 PROCEDURE — 84100 ASSAY OF PHOSPHORUS: CPT

## 2023-10-05 PROCEDURE — 86900 BLOOD TYPING SEROLOGIC ABO: CPT

## 2023-10-05 PROCEDURE — 94669 MECHANICAL CHEST WALL OSCILL: CPT

## 2023-10-05 PROCEDURE — 94003 VENT MGMT INPAT SUBQ DAY: CPT

## 2023-10-05 PROCEDURE — 99232 SBSQ HOSP IP/OBS MODERATE 35: CPT

## 2023-10-05 PROCEDURE — 85018 HEMOGLOBIN: CPT

## 2023-10-05 PROCEDURE — P9016 RBC LEUKOCYTES REDUCED: HCPCS

## 2023-10-05 PROCEDURE — 86901 BLOOD TYPING SEROLOGIC RH(D): CPT

## 2023-10-05 PROCEDURE — 99291 CRITICAL CARE FIRST HOUR: CPT | Performed by: INTERNAL MEDICINE

## 2023-10-05 PROCEDURE — 85007 BL SMEAR W/DIFF WBC COUNT: CPT

## 2023-10-05 PROCEDURE — 94760 N-INVAS EAR/PLS OXIMETRY 1: CPT

## 2023-10-05 PROCEDURE — 94150 VITAL CAPACITY TEST: CPT

## 2023-10-05 PROCEDURE — 86923 COMPATIBILITY TEST ELECTRIC: CPT

## 2023-10-05 PROCEDURE — 85014 HEMATOCRIT: CPT

## 2023-10-05 PROCEDURE — 86850 RBC ANTIBODY SCREEN: CPT

## 2023-10-05 PROCEDURE — 80053 COMPREHEN METABOLIC PANEL: CPT

## 2023-10-05 PROCEDURE — 83735 ASSAY OF MAGNESIUM: CPT

## 2023-10-05 PROCEDURE — 85027 COMPLETE CBC AUTOMATED: CPT

## 2023-10-05 RX ORDER — MAGNESIUM SULFATE HEPTAHYDRATE 40 MG/ML
2 INJECTION, SOLUTION INTRAVENOUS ONCE
Status: COMPLETED | OUTPATIENT
Start: 2023-10-05 | End: 2023-10-05

## 2023-10-05 RX ORDER — SODIUM CHLORIDE 9 MG/ML
150 INJECTION, SOLUTION INTRAVENOUS CONTINUOUS
Status: DISCONTINUED | OUTPATIENT
Start: 2023-10-05 | End: 2023-10-06

## 2023-10-05 RX ADMIN — SODIUM CHLORIDE SOLN NEBU 3% 4 ML: 3 NEBU SOLN at 07:19

## 2023-10-05 RX ADMIN — SODIUM CHLORIDE 150 ML/HR: 0.9 INJECTION, SOLUTION INTRAVENOUS at 16:22

## 2023-10-05 RX ADMIN — LEVALBUTEROL HYDROCHLORIDE 1.25 MG: 1.25 SOLUTION RESPIRATORY (INHALATION) at 13:05

## 2023-10-05 RX ADMIN — LEVALBUTEROL HYDROCHLORIDE 1.25 MG: 1.25 SOLUTION RESPIRATORY (INHALATION) at 19:28

## 2023-10-05 RX ADMIN — OXYCODONE HYDROCHLORIDE 5 MG: 5 SOLUTION ORAL at 21:20

## 2023-10-05 RX ADMIN — Medication 8.8 MG: at 21:20

## 2023-10-05 RX ADMIN — BUSPIRONE HYDROCHLORIDE 30 MG: 10 TABLET ORAL at 17:25

## 2023-10-05 RX ADMIN — NYSTATIN 500000 UNITS: 100000 SUSPENSION ORAL at 21:20

## 2023-10-05 RX ADMIN — BUSPIRONE HYDROCHLORIDE 30 MG: 10 TABLET ORAL at 09:39

## 2023-10-05 RX ADMIN — SERTRALINE 75 MG: 25 TABLET, FILM COATED ORAL at 08:46

## 2023-10-05 RX ADMIN — LEVALBUTEROL HYDROCHLORIDE 1.25 MG: 1.25 SOLUTION RESPIRATORY (INHALATION) at 07:19

## 2023-10-05 RX ADMIN — CHLORHEXIDINE GLUCONATE 15 ML: 1.2 SOLUTION ORAL at 08:44

## 2023-10-05 RX ADMIN — IPRATROPIUM BROMIDE 0.5 MG: 0.5 SOLUTION RESPIRATORY (INHALATION) at 19:28

## 2023-10-05 RX ADMIN — CHLORHEXIDINE GLUCONATE 15 ML: 1.2 SOLUTION ORAL at 21:20

## 2023-10-05 RX ADMIN — LEVALBUTEROL HYDROCHLORIDE 1.25 MG: 1.25 SOLUTION RESPIRATORY (INHALATION) at 03:13

## 2023-10-05 RX ADMIN — FILGRASTIM-AAFI 300 MCG: 300 INJECTION, SOLUTION SUBCUTANEOUS at 09:39

## 2023-10-05 RX ADMIN — GUAIFENESIN 200 MG: 200 SOLUTION ORAL at 03:44

## 2023-10-05 RX ADMIN — FLUCONAZOLE 400 MG: 100 TABLET ORAL at 08:47

## 2023-10-05 RX ADMIN — FAMOTIDINE 20 MG: 20 TABLET, FILM COATED ORAL at 17:24

## 2023-10-05 RX ADMIN — FAMOTIDINE 20 MG: 20 TABLET, FILM COATED ORAL at 08:47

## 2023-10-05 RX ADMIN — ALLOPURINOL 300 MG: 100 TABLET ORAL at 08:47

## 2023-10-05 RX ADMIN — GUAIFENESIN 200 MG: 200 SOLUTION ORAL at 21:23

## 2023-10-05 RX ADMIN — LEVOFLOXACIN 500 MG: 250 TABLET, FILM COATED ORAL at 08:47

## 2023-10-05 RX ADMIN — IPRATROPIUM BROMIDE 0.5 MG: 0.5 SOLUTION RESPIRATORY (INHALATION) at 13:05

## 2023-10-05 RX ADMIN — MAGNESIUM SULFATE HEPTAHYDRATE 2 G: 40 INJECTION, SOLUTION INTRAVENOUS at 08:48

## 2023-10-05 RX ADMIN — BUSPIRONE HYDROCHLORIDE 30 MG: 10 TABLET ORAL at 21:20

## 2023-10-05 RX ADMIN — SODIUM CHLORIDE SOLN NEBU 3% 4 ML: 3 NEBU SOLN at 03:13

## 2023-10-05 RX ADMIN — ACYCLOVIR 400 MG: 200 CAPSULE ORAL at 17:24

## 2023-10-05 RX ADMIN — SODIUM CHLORIDE SOLN NEBU 3% 4 ML: 3 NEBU SOLN at 19:28

## 2023-10-05 RX ADMIN — NYSTATIN 500000 UNITS: 100000 SUSPENSION ORAL at 13:23

## 2023-10-05 RX ADMIN — GUAIFENESIN 200 MG: 200 SOLUTION ORAL at 13:23

## 2023-10-05 RX ADMIN — ACYCLOVIR 400 MG: 200 CAPSULE ORAL at 08:59

## 2023-10-05 RX ADMIN — OXYCODONE HYDROCHLORIDE 5 MG: 5 SOLUTION ORAL at 05:44

## 2023-10-05 RX ADMIN — NYSTATIN 500000 UNITS: 100000 SUSPENSION ORAL at 17:24

## 2023-10-05 RX ADMIN — GABAPENTIN 300 MG: 300 CAPSULE ORAL at 08:47

## 2023-10-05 RX ADMIN — NYSTATIN 500000 UNITS: 100000 SUSPENSION ORAL at 08:44

## 2023-10-05 RX ADMIN — ENOXAPARIN SODIUM 30 MG: 30 INJECTION SUBCUTANEOUS at 08:44

## 2023-10-05 RX ADMIN — SODIUM CHLORIDE SOLN NEBU 3% 4 ML: 3 NEBU SOLN at 13:05

## 2023-10-05 RX ADMIN — IPRATROPIUM BROMIDE 0.5 MG: 0.5 SOLUTION RESPIRATORY (INHALATION) at 07:19

## 2023-10-05 RX ADMIN — QUETIAPINE FUMARATE 50 MG: 25 TABLET ORAL at 21:20

## 2023-10-05 RX ADMIN — GABAPENTIN 300 MG: 300 CAPSULE ORAL at 21:20

## 2023-10-05 RX ADMIN — IPRATROPIUM BROMIDE 0.5 MG: 0.5 SOLUTION RESPIRATORY (INHALATION) at 03:13

## 2023-10-05 RX ADMIN — OXYCODONE HYDROCHLORIDE 5 MG: 5 SOLUTION ORAL at 13:24

## 2023-10-05 RX ADMIN — NICOTINE 21 MG: 21 PATCH, EXTENDED RELEASE TRANSDERMAL at 08:48

## 2023-10-05 RX ADMIN — GUAIFENESIN 200 MG: 200 SOLUTION ORAL at 08:44

## 2023-10-05 NOTE — SOCIAL WORK
A family meeting was necessary to allow for thorough discussion regarding patient's clinical presentation, expected disease trajectory, and comfort care versus continuing disease directed therapy. Please refer to [facility] provider note for medical specifics. Questions related to medical diagnoses, comfort care and prognosis were answered and all agree:     • The patient/family have jointly decided on disease-focused care without limits. • Pt/family agreeable with pursuing bronchoscopy. • Pt also wishes to continue pursuing chemotherapy. • Individuals present at meeting include: Pt, pt's dtr Toño Bridges, son Kelly Bermeo, ex- Alfredo Merchant., Dr. Truong Franco, and [facility] team.   • I have spent 30 minutes with Patient and family today in which greater than 50% of this time was spent in counseling/coordination of care regarding   • Another conversation related to patient's goals will need to be held as pt's clinical presentation evolves.

## 2023-10-05 NOTE — PROGRESS NOTES
Progress Note - Palliative & Supportive Care  [facility] SERVICE  69 y.o.  female  ICU 03/ICU 03   MRN: [id number]  Encounter: [id number]     Assessment  Acute respiratory failure with hypoxia  HFpEF  Ambulatory dysfunction  New onset right middle lobe pneumonia  PO  Oropharyngeal mass  Goals of care counseling  Palliative care encounter    Plan  1. Symptom Management  · Per ICU team at this time     2. Goals of Care  • Level 1 code status  • Continue disease directed therapies without limits  • Discussed goals of care with patient, her daughter Angeles Nunn, son Roxanne Marvin, and patient's ex- Yvrose Sood  • Discussed bronchoscopy along with ICU resident and patient and family are agreeable to bronchoscopy. Patient was originally declining due to concerns about what the procedure would entail. • They all wish to continue full care without any limits including chemotherapy  • Introduced comfort care if patient wished to forego further disease directed therapies  • Patient states firmly "I want everything. I want to live. I don't want to die."  • Goals of care discussions will be ongoing as clinical situation evolves    3. Social Support  • Patient supported by her son Yvrose Sood and daughter Angeles Nunn. She is . • Emotional support provided  • Palliative LSW following     4. Follow-up  • Palliative care will continue to follow and goals of care conversations will be ongoing. Please reach out with any questions or concerns. 24 Hour History  Chart reviewed before visit. Per chart review, patient's vent popped off 6x overnight due to coughing and patient's O2 desaturated to 80s every time. Per primary team, meeting has been arranged with patient's family this afternoon. Patient appears comfortable and in no acute distress at time of visit. She is alert and oriented x3 and not lethargic, confused, or hallucinating. She is able to answer questions appropriately.  She started coughing during visit and ventilator popped off of trach. ICU resident and RN responded by replacing the ventilator. Her saturation dropped to the 70s during the visit and returned to the 90s quickly upon replacement. PALLIATIVE AND SUPPORTIVE CARE FAMILY CONFERENCE:     Time of Meeting: 3:15-3:45     Participants: patient, patient's daughter Michaelle Lr, son King Grant, and ex- Nilda, ICU resident Clifton Hui MD, and [address] [place] [address] Ca Team Saint ignace LSW and Pamela Cohen PA-C     Patient Participation: Patient was an active participant in the meeting. She is able to make her own medical decisions with the support of family. Patient Support System: Family     Meeting Location: bedside       A family meeting was necessary to allow for thorough discussion regarding patient's clinical presentation, expected disease trajectory, and comfort care versus continuing disease directed therapy in the setting of acute respiratory failure with hypoxia, large cell lymphoma. Comfort care has been introduced. The patient and family had questions related to medical diagnoses, prognosis, comfort care, and coverage and after all questions were answered they agreed to continue disease directed therapies without any limits. They wish for bronchoscopy to be completed and wish to continue with chemotherapy. Goals of care discussions will be ongoing. Review of Systems   All other systems reviewed and are negative.       Medications    Current Facility-Administered Medications:   •  acetaminophen (TYLENOL) tablet 650 mg, 650 mg, Oral, Q6H PRN, Danay Watkins MD, 650 mg at 09/28/23 1329  •  acyclovir (ZOVIRAX) capsule 400 mg, 400 mg, Oral, BID, Danay Watkins MD, 400 mg at 10/05/23 0859  •  allopurinol (ZYLOPRIM) tablet 300 mg, 300 mg, Oral, Daily, Arianne Spencer MD, 300 mg at 10/05/23 0847  •  amLODIPine (NORVASC) tablet 10 mg, 10 mg, Oral, Daily, Danay Watkins MD, 10 mg at 10/03/23 0835  •  busPIRone (BUSPAR) tablet 30 mg, 30 mg, Oral, TID, Josef Moore MD, 30 mg at 10/05/23 3183  •  carvedilol (COREG) tablet 12.5 mg, 12.5 mg, Oral, BID With Meals, Quincy Krueger MD, 12.5 mg at 10/03/23 0828  •  chlorhexidine (PERIDEX) 0.12 % oral rinse 15 mL, 15 mL, Mouth/Throat, Q12H [address], Trent Spencer MD, 15 mL at 10/05/23 0844  •  enoxaparin (LOVENOX) subcutaneous injection 30 mg, 30 mg, Subcutaneous, Q24H [address], Trent Spencer MD, 30 mg at 10/05/23 0844  •  famotidine (PEPCID) tablet 20 mg, 20 mg, Oral, BID, Trent Spencer MD, 20 mg at 10/05/23 0847  •  Filgrastim-aafi (NIVESTYM) subcutaneous injection 300 mcg, 300 mcg, Subcutaneous, Daily, RANDEE Serna, 300 mcg at 10/05/23 5837  •  fluconazole (DIFLUCAN) tablet 400 mg, 400 mg, Oral, Daily, Josef Moore MD, 400 mg at 10/05/23 0847  •  gabapentin (NEURONTIN) capsule 300 mg, 300 mg, Oral, TID, Josef Moore MD, 300 mg at 10/05/23 0847  •  guaiFENesin (ROBITUSSIN) oral liquid 200 mg, 200 mg, Oral, Q6H, Trent Spencer MD, 200 mg at 10/05/23 1323  •  HYDROmorphone HCl (DILAUDID) injection 0.2 mg, 0.2 mg, Intravenous, Q6H PRN, Karina Cordoba MD  •  levalbuterol (XOPENEX) inhalation solution 1.25 mg, 1.25 mg, Nebulization, Q6H, 1.25 mg at 10/05/23 1305 **AND** ipratropium (ATROVENT) 0.02 % inhalation solution 0.5 mg, 0.5 mg, Nebulization, Q6H, Trent Spencer MD, 0.5 mg at 10/05/23 1305  •  levalbuterol (XOPENEX) inhalation solution 1.25 mg, 1.25 mg, Nebulization, Q8H PRN, Nell Spencer MD, 1.25 mg at 09/22/23 2318  •  levofloxacin (LEVAQUIN) tablet 500 mg, 500 mg, Oral, Q24H [address], Trent Spencer MD, 500 mg at 10/05/23 0847  •  nicotine (NICODERM CQ) 21 mg/24 hr TD 24 hr patch 21 mg, 21 mg, Transdermal, Daily, Trent Spencer MD, 21 mg at 10/05/23 0848  •  nystatin (MYCOSTATIN) oral suspension 500,000 Units, 500,000 Units, Swish & Swallow, 4x Daily, Rickey Mosqueda MD, 500,000 Units at 10/05/23 1323  •  ondansetron (ZOFRAN) injection 4 mg, 4 mg, Intravenous, Q8H PRN, Rickey Mosqueda MD  •  oxyCODONE (ROXICODONE) oral solution 2.5 mg, 2.5 mg, Oral, Q4H PRN, 2.5 mg at 10/01/23 0423 **OR** oxyCODONE (ROXICODONE) oral solution 5 mg, 5 mg, Oral, Q4H PRN, Rickey Mosqueda MD, 5 mg at 10/03/23 2180  •  oxyCODONE (ROXICODONE) oral solution 5 mg, 5 mg, Oral, Q8H, Trent Spencer MD, 5 mg at 10/05/23 1324  •  [COMPLETED] polyethylene glycol (MIRALAX) packet 17 g, 17 g, Oral, Once, 17 g at 09/16/23 1053 **FOLLOWED BY** polyethylene glycol (MIRALAX) packet 17 g, 17 g, Oral, Daily PRN, Rickey Mosqueda MD, 17 g at 09/24/23 1121  •  QUEtiapine (SEROquel) tablet 50 mg, 50 mg, Oral, HS, RANDEE Ellis, 50 mg at 10/04/23 2114  •  senna oral syrup 8.8 mg, 8.8 mg, Per NG Tube, HS, Trent Spencer MD, 8.8 mg at 10/04/23 2113  •  sertraline (ZOLOFT) tablet 75 mg, 75 mg, Per PEG Tube, Daily, RANDEE Ellis, 75 mg at 10/05/23 0846  •  sodium chloride 3 % inhalation solution 4 mL, 4 mL, Nebulization, Q6H, Trent Spencer MD, 4 mL at 10/05/23 1305  •  sulfamethoxazole-trimethoprim (BACTRIM DS) 800-160 mg per tablet 1 tablet, 1 tablet, Oral, Once per day on Mon Wed Fri, Trent Spencer MD, 1 tablet at 10/04/23 0908    Objective  /56   Pulse 76   Temp 99 °F (37.2 °C) (Oral)   Resp (!) 10   Ht 5' 1" (1.549 m)   Wt 78.3 kg (172 lb 9.9 oz)   SpO2 96%   BMI 32.62 kg/m²   Physical Exam:   Constitutional: Comfortable and in no acute distress. Head: Normocephalic and atraumatic. Eyes: EOM are normal. No ocular discharge. No scleral icterus. Neck: no visible adenopathy or masses  Cardiac: Normal rate   Respiratory: No stridor. No respiratory distress. Cough present. Trach in place  Gastrointestinal: No abdominal distension. Musculoskeletal: No abnormal movements  Neurological: Alert and oriented x3. Answers all questions appropriately  Skin: Dry, no diaphoresis. Psychiatric: Calm with no signs of agitation    Lab Results:   I have personally reviewed pertinent labs. , CBC:   Lab Results   Component Value Date    WBC 1.71 (LL) 10/05/2023    HGB 7.0 (L) 10/05/2023    HCT 22.6 (L) 10/05/2023    MCV 99 (H) 10/05/2023     (L) 10/05/2023    RBC 2.28 (L) 10/05/2023    MCH 30.7 10/05/2023    MCHC 31.0 (L) 10/05/2023    RDW 16.0 (H) 10/05/2023    MPV 10.5 10/05/2023    NRBC 3 (H) 10/05/2023   , CMP:   Lab Results   Component Value Date    SODIUM 134 (L) 10/05/2023    K 4.6 10/05/2023    CL 94 (L) 10/05/2023    CO2 33 (H) 10/05/2023    BUN 49 (H) 10/05/2023    CREATININE 2.00 (H) 10/05/2023    CALCIUM 8.6 10/05/2023    AST 34 10/05/2023    ALT 37 10/05/2023    ALKPHOS 284 (H) 10/05/2023    EGFR 24 10/05/2023     Imaging Studies: I have personally reviewed pertinent reports. EKG, Pathology, and Other Studies: I have personally reviewed pertinent reports. Counseling / Coordination of Care  Total floor / unit time spent today 70 minutes. Greater than 50% of total time was spent with the patient and / or family counseling and / or coordinating of care. A description of the counseling / coordination of care: Chart reviewed, provided medical updates, determined goals of care, discussed palliative care and symptom management, discussed comfort care, determined competency and POA/HCA, determined social/family support, provided psychosocial support. Discussed with ICU resident Dr. Juan Antonio King    privacy exception: note includes other individuals    Rancho Blackburn PA-C  Palliative & Supportive Care    Portions of this document may have been created using dictation software and as such some "sound alike" terms may have been generated by the system. Do not hesitate to contact me with any questions or clarifications.

## 2023-10-05 NOTE — PHYSICAL THERAPY NOTE
Physical Therapy Cancellation Note         10/05/23 1506   Note Type   Note type Cancelled Session   Cancel Reasons Other   Additional Comments Pt with active PT orders. Per ICU mobility rounds, pt not currently appropriate for PT treatment. Pt pending goals of care discussion, as well as declining bronch at this time. Will hold PT treatment and f/u as appropriate.      Mary Gayle, PT

## 2023-10-05 NOTE — OCCUPATIONAL THERAPY NOTE
Occupational Therapy Cancellation Note        Patient Name: Ahmad Alpers  MWVZA'V Date: 10/5/2023         10/05/23 5133   Note Type   Note type Cancelled Session   Cancel Reasons Medical status   Additional Comments pt with active OT orders. Per RN at ICU rounds, pt hypotensive and desatting this AM. Plan for [employer] meeting today.  Will f/u as appropriate     Jade Mention, OT

## 2023-10-05 NOTE — QUICK NOTE
Spoke with patient's daughter on the phone, she will be coming in today and is aware of meeting to discuss goals of care.

## 2023-10-05 NOTE — PROGRESS NOTES
[address] [facility]  Progress Note: Critical Care  Name: Laurie Grijalva 69 y.o. female I MRN: [id number]  Unit/Bed#: ICU 03 I Date of Admission: 9/13/2023   Date of Service: 10/5/2023 I Hospital Day: 22    Assessment/Plan   * Acute respiratory failure with hypoxia secondary to oropharyngeal mass  Assessment & Plan  Cuffless trach placed 9/17, transitioned to cuffed on 10/3. Oxygen requirements not improving. Patient appears to be quite depressed with trach in place. For mental health patient is on buspirone, quetiapine, and sertraline. For pain, gabapentin, oxycodone scheduled and prn, and hydromorphone prn. On Guaifenesin, Atrovent and Xopenex for airway maintenance. CXR 10/1 lungs not significantly changed from prior CT: Cardiomegaly, vascular congestion, bibasilar atelectasis, pleural effusion L>R. 10/3 CXR unchanged. 14/10/70% satting 93%     Plan:  • Attempt to wean O2 requirements  • Can trial with valve during day but must be cuffed overnight  • Recommend Bronchoscopy to help open airways and better assess    Large cell lymphoma [facility])  Assessment & Plan  Biopsy on 9/17: Large B-cell lymphoma, germinal center B-cell type, c-MYC and BCL-2 double expression. Ki-67 proliferation index is up to 90%; FISH & NGS pending. Staging work-up: Currently stage II. First inpatient chemotherapy 9/22/2023- with R-EPOCH. 9/27 Rituxan. 9/28 Filgrastim. On acyclovir, Zyloprim, Diflucan, Levaquin, Zofran, Bactrim for chemo prophylaxis, will continue until ANC > 500. Currently Neutropenic with  and WBC 0.45, todays CBC still pending. BCx NG4D, Sputum Cx and gram stain no bacteria.  No additional fevers in past 24 hours    Plan:  · Inpatient and outpatient hematology oncology following  · Monitor ANC on CBC w Diff  · Monitor CMP for tumor lysis syndrome  · Repeat BCx if patient spikes another fever    (HFpEF) heart failure with preserved ejection fraction (HCC)  Assessment & Plan  Wt Readings from Last 3 Encounters:   10/05/23 78.3 kg (172 lb 9.9 oz)   09/07/23 74.6 kg (164 lb 6.4 oz)   08/30/23 73.3 kg (161 lb 9.6 oz)     Lab Results   Component Value Date    LVEF 60 08/28/2023     (H) 09/29/2023     (H) 09/13/2023     Echo 8/28/23: LVEF 60%. CXR 9/22: Persistent pulmonary venous congestion with small effusions and bibasilar atelectasis. Patient has not responded well to diuresis with Lasix 40 or Bumex. Patient received 2 doses of IV Bumex 2 g 2 days ago without significant change with urine output, but Cr increased by more than 0.3 meeting criteria for an PO. Despite holding diuresis, Cr has continued to increase over past 2 days. Poor urine output.       Plan:  • BMP, magnesium; Goal Mg > 2 and K > 4; Replete prn  • HOB > 30°, Daily standing weights, Measure I/O        Depression  Assessment & Plan  Patient on Zoloft 75 daily and Seroquel evening. Patient is depressed, will hold off on additional palliative goals of care discussion for now as appears to be linked to her frustrations with vent limiting mobility and communication, may improve with respiratory status and when patient can speak with valve. Generalized abdominal pain  Assessment & Plan  Patient reports abdominal pain is mainly in the epigastric area. Intermittently with coughing. Takes Famotidine for GERD at home. Alk phos 10/2 145, today 284. One potential dark stool two days ago in the setting of hg 8.5 drop to 7.2, will monitor closely    - Continue Famotidine  - On bowel regimen with Senna and Miralax prn    Chronic Superficial thrombophlebitis  Assessment & Plan  Right forearm soreness. BUE ultrasound in setting of high risk of DVT. RUE US[de-identified] No evidence of acute or chronic deep vein thrombosis. 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     -Continue DVT ppx with Lovenox    Blister (nonthermal) of left forearm, sequela  Assessment & Plan  Blisters of LUE noted, no signs of infection, healing well.  Daily wound care    Oropharyngeal mass  Assessment & Plan  9/14 Neck/ soft tissue CT: Large enhancing soft tissue mass identified within the left neck involving multiple spaces. Centered within the left oropharynx with extensions as described above into multiple spaces. This results in significant airway compromise. 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 Small level 3 and level 4 nodes are seen within the neck. Tracheostomy by ENT, bx & addition testing performed. Replaced on 9/26. H/o tobacco abuse, daily smoker. On nicotine while inpatient    Plan:  · ENT and Med onc following  · See acute respiratory failure and large cell lymphoma above      Angioedema  Assessment & Plan  POA with acute respiratory failure, SOB. On physical in [facility] ED: Swollen tongue, swelling soft and hard palate and almost the head of all phalanx is completely closed. Severe angioedema. Decadron 10 mg, Benadryl 25 mg given. Initially refused but eventually agreed with intubation. Prior episode of angioedema in 2020 noted. Suspected coadministration of increase the dose of tramadol and lisinopril. Per daughter, there is no recent change in medications except Trelegy inhaler, cefdinir. Etiology: more likely 2/2 oropharyngeal mass compression. Plan:  • Cuffless trach placed 9/17, transitioned to cuffed 10/3  • Tolerating trach without issue  • See acute respiratory failure with hypoxia secondary to oropharyngeal mass above    Ambulatory dysfunction  Assessment & Plan  Impacted by chronic pain syndrome, but medication regimen for this may also contribute. Patient currently requires tracheostomy and ICU level care. Will require PT/ OT eval before discharge.     CKD (chronic kidney disease) stage 3, GFR 30-59 ml/min [facility])  Assessment & Plan  Lab Results   Component Value Date    EGFR 24 10/05/2023    EGFR 30 10/04/2023    EGFR 33 10/03/2023    CREATININE 2.00 (H) 10/05/2023    CREATININE 1.71 (H) 10/04/2023    CREATININE 1.56 (H) 10/03/2023     Baseline Cr appears to be between 0.75-1.1. Patient received 2 doses of Bumex IV 2 g yesterday as she had not been responding appropriately to IV 40 Lasix daily. Creatinine went up by 0.5 meeting criteria for an PO. This may be prerenal intrinsic or postrenal.  Potential prerenal causes include reduced kidney perfusion due to lisinopril. Intrinsic can also be lisinopril due to ATN, AIN from chemo medications, TLS, crystaline nephropathy from acyclovir, rituxan nephrotoxicity, filgrastim glomerulonephritis. Post renal obstructive could be from urine retention from vincristine, cyclophosphamide bladder fibrosis. Suspect most likely due to medications as a combination of prerenal and intrinsic. Cr up to 2 today. FENa was 0.2%, UA shows no casts or signs of infection, urine eosinophils 0%. Patient likely has prerenal PO from volume depletion    - Hold on Diuresis and Lisinopril  - Repeat bladder scan  - Order UA and FeNa    Pain syndrome, chronic  Assessment & Plan  Home regimen: Oxycodone 5 mg twice daily as needed. Tylenol 650 mg every 8 hours as needed. Gabapentin 600 mg 3 times daily. Medical marijuana. Pain mostly from lumbar radiculopathy. Impaired ambulatory dysfunction second to pain. Patient is taking opioids regularly for pain, has bowel regimen and is having bowel movements daily. Plan:  · Continue gabapentin 300 mg 3 times daily, oxycodone 5 mg 3 times daily, prn Tylenol 650 mg every 6 hours. Benign essential hypertension  Assessment & Plan  On Coreg 12.5 mg BID and Amlodipine 10 mg daily. Lisinopril 40 mg daily on hold due to PO. Patient experiencing more soft blood pressures around systolic 90 in past day. Received albumin and rechecked around calf was much higher. Given patient's palpable pulses, not concerned about hypotension at present    Plan:  · PRN hydralazine if BP >160.       Hyperlipidemia-resolved as of 10/2/2023  Assessment & Plan  Lab Results   Component Value Date    CHOLESTEROL 203 (H) 01/24/2023    CHOLESTEROL 177 08/11/2022    CHOLESTEROL 184 07/08/2020     Lab Results   Component Value Date    HDL 45 (L) 01/24/2023    HDL 37 (L) 08/11/2022    HDL 44 (L) 07/08/2020     Lab Results   Component Value Date    TRIG 166 (H) 09/16/2023    TRIG 160 (H) 01/24/2023    TRIG 108 08/11/2022     Lab Results   Component Value Date    NONHDLC 146 07/12/2018    [address] 207 (H) 04/29/2013     Continue outpatient diet and lifestyle modification. ICU Core Measures     A: Assess, Prevent, and Manage Pain · Has pain been assessed? Yes  · Need for changes to pain regimen? No   B: Both SAT/SAT  · N/A   C: Choice of Sedation · RASS Goal: 0 Alert and Calm or N/A patient not on sedation  · Need for changes to sedation or analgesia regimen? No   D: Delirium · CAM-ICU: Negative   E: Early Mobility  · Plan for early mobility? No   F: Family Engagement · Plan for family engagement today? No       Antibiotic Review: PPX for [facility] regimen with neutropenia    Review of Invasive Devices:            Prophylaxis:  VTE VTE covered by:  enoxaparin, Subcutaneous       Stress Ulcer  covered byfamotidine (PEPCID) oral suspension 40 mg [[id number]], pantoprazole (PROTONIX) injection 40 mg [[id number]]          Subjective   HPI/24hr events:   Patient feels a bit better, but is still nauseous. She feels she is coughing more. Her vent popped off 6 times over night causing her to desaturate down to the 80s everytime. Review of Systems   Constitutional: Negative for chills and fever. Respiratory: Positive for cough. Negative for shortness of breath and wheezing. Cardiovascular: Negative for chest pain, palpitations and leg swelling. Gastrointestinal: Positive for nausea. Genitourinary: Positive for decreased urine volume.           Objective                            Vitals I/O      Most Recent Min/Max in 24hrs   Temp 99.5 °F (37.5 °C) Temp  Min: 99.1 °F (37.3 °C)  Max: 99.8 °F (37.7 °C)   Pulse 95 Pulse  Min: 81  Max: 98   Resp (!) 23 Resp  Min: 13  Max: 30   BP (!) 86/51 BP  Min: 72/50  Max: 113/54   O2 Sat 93 % SpO2  Min: 88 %  Max: 100 %      Intake/Output Summary (Last 24 hours) at 10/5/2023 0751  Last data filed at 10/5/2023 0545  Gross per 24 hour   Intake 2876 ml   Output 850 ml   Net 2026 ml         Diet Enteral/Parenteral; Tube Feeding No Oral Diet; Jevity 1.5; Continuous; 20; Every 4 hours     Invasive Monitoring Physical exam    Physical Exam  Constitutional:       General: She is not in acute distress. Appearance: She is not ill-appearing. HENT:      Head: Normocephalic and atraumatic. Cardiovascular:      Rate and Rhythm: Normal rate and regular rhythm. Heart sounds: Normal heart sounds. No murmur heard. Pulmonary:      Effort: Pulmonary effort is normal. No respiratory distress. Breath sounds: Normal breath sounds. No rales. Abdominal:      Palpations: Abdomen is soft. Tenderness: There is abdominal tenderness. There is no guarding. Musculoskeletal:      Right lower leg: No edema. Left lower leg: No edema. Skin:     Coloration: Skin is not jaundiced or pale. Neurological:      Mental Status: She is alert and oriented to person, place, and time.       Comments: Good strength, able to communicate by writing on clipboard   Psychiatric:      Comments: Depressed           Diagnostic Studies      No new imaging to review at this time     Medications:  Scheduled PRN   acyclovir, 400 mg, BID  allopurinol, 300 mg, Daily  amLODIPine, 10 mg, Daily  busPIRone, 30 mg, TID  carvedilol, 12.5 mg, BID With Meals  chlorhexidine, 15 mL, Q12H ASHLEY  enoxaparin, 30 mg, Q24H [address]  famotidine, 20 mg, BID  Filgrastim-aafi, 300 mcg, Daily  fluconazole, 400 mg, Daily  gabapentin, 300 mg, TID  guaiFENesin, 200 mg, Q6H  levalbuterol, 1.25 mg, Q6H   And  ipratropium, 0.5 mg, Q6H  levofloxacin, 500 mg, Q24H [address]  magnesium sulfate, 2 g, Once  nicotine, 21 mg, Daily  nystatin, 500,000 Units, 4x Daily  oxyCODONE, 5 mg, Q8H  QUEtiapine, 50 mg, HS  senna, 8.8 mg, HS  sertraline, 75 mg, Daily  sodium chloride, 4 mL, Q6H  sulfamethoxazole-trimethoprim, 1 tablet, Once per day on Mon Wed Fri      acetaminophen, 650 mg, Q6H PRN  HYDROmorphone, 0.2 mg, Q6H PRN  levalbuterol, 1.25 mg, Q8H PRN  ondansetron, 4 mg, Q8H PRN  oxyCODONE, 2.5 mg, Q4H PRN   Or  oxyCODONE, 5 mg, Q4H PRN  polyethylene glycol, 17 g, Daily PRN       Continuous          Labs:    CBC    Recent Labs     10/04/23  0459 10/04/23  1512   WBC 0.45*  --    HGB 7.2* 7.7*   HCT 23.0* 24.6*   PLT 77*  --    BANDSPCT 4  --      BMP    Recent Labs     10/04/23  0459 10/05/23  0541   SODIUM 135 134*   K 4.6 4.6   CL 95* 94*   CO2 35* 33*   AGAP 5 7   BUN 41* 49*   CREATININE 1.71* 2.00*   CALCIUM 8.8 8.6       Coags    No recent results     Additional Electrolytes  Recent Labs     10/05/23  0541   MG 1.9   PHOS 2.9          Blood Gas    No recent results  No recent results LFTs  Recent Labs     10/04/23  0459 10/05/23  0541   ALT 32 37   AST 33 34   ALKPHOS 232* 284*   ALB 2.3* 2.6*   TBILI 0.78 0.77       Infectious  No recent results  Glucose  Recent Labs     10/04/23  0459 10/05/23  0541   GLUC 140 150*                   Shelly Roblero MD

## 2023-10-05 NOTE — CASE MANAGEMENT
Case Management Progress Note    Patient name Keven Dwyer  Location ICU 03/ICU 03 MRN [id number]  : 1955 Date 10/5/2023       LOS (days): 22  Geometric Mean LOS (GMLOS) (days): 26.10  Days to GMLOS:4.1        OBJECTIVE:        Current admission status: Inpatient  Preferred Pharmacy:   [id number] [place] - [address]. [address] [facility] [address]  Phone: [phone] Fax: [phone] [place] [address]  Phone: [phone] Fax: [phone]    Primary Care Provider: Lauren Fonseca MD    Primary Insurance: [employer] [place]  Secondary Insurance: [employer]donado NOTE:    Cm received phone call from [facility]  Moises Rodriguez. Cm returned phone call and awaiting response back. Ms. Hiren Pedraza was inquiring about dc planning. Cm awaiting return call.

## 2023-10-06 ENCOUNTER — APPOINTMENT (INPATIENT)
Dept: GASTROENTEROLOGY | Facility: HOSPITAL | Age: 68
DRG: 004 | End: 2023-10-06
Payer: COMMERCIAL

## 2023-10-06 LAB
ABO GROUP BLD BPU: NORMAL
ALBUMIN SERPL BCP-MCNC: 2.5 G/DL (ref 3.5–5)
ALP SERPL-CCNC: 275 U/L (ref 34–104)
ALT SERPL W P-5'-P-CCNC: 34 U/L (ref 7–52)
ANION GAP SERPL CALCULATED.3IONS-SCNC: 6 MMOL/L
AST SERPL W P-5'-P-CCNC: 28 U/L (ref 13–39)
BACTERIA BLD CULT: NORMAL
BACTERIA BLD CULT: NORMAL
BASOPHILS # BLD AUTO: 0.03 THOUSANDS/ÂΜL (ref 0–0.1)
BASOPHILS NFR BLD AUTO: 1 % (ref 0–1)
BILIRUB SERPL-MCNC: 0.71 MG/DL (ref 0.2–1)
BPU ID: NORMAL
BUN SERPL-MCNC: 53 MG/DL (ref 5–25)
CALCIUM ALBUM COR SERPL-MCNC: 9.8 MG/DL (ref 8.3–10.1)
CALCIUM SERPL-MCNC: 8.6 MG/DL (ref 8.4–10.2)
CHLORIDE SERPL-SCNC: 93 MMOL/L (ref 96–108)
CO2 SERPL-SCNC: 32 MMOL/L (ref 21–32)
CREAT SERPL-MCNC: 2.07 MG/DL (ref 0.6–1.3)
CROSSMATCH: NORMAL
EOSINOPHIL # BLD AUTO: 0 THOUSAND/ÂΜL (ref 0–0.61)
EOSINOPHIL NFR BLD AUTO: 0 % (ref 0–6)
ERYTHROCYTE [DISTWIDTH] IN BLOOD BY AUTOMATED COUNT: 17.3 % (ref 11.6–15.1)
GFR SERPL CREATININE-BSD FRML MDRD: 23 ML/MIN/1.73SQ M
GLUCOSE SERPL-MCNC: 125 MG/DL (ref 65–140)
HCT VFR BLD AUTO: 25.5 % (ref 34.8–46.1)
HGB BLD-MCNC: 7.9 G/DL (ref 11.5–15.4)
IMM GRANULOCYTES # BLD AUTO: >0.5 THOUSAND/UL (ref 0–0.2)
IMM GRANULOCYTES NFR BLD AUTO: 11 % (ref 0–2)
LYMPHOCYTES # BLD AUTO: 0.51 THOUSANDS/ÂΜL (ref 0.6–4.47)
LYMPHOCYTES NFR BLD AUTO: 8 % (ref 14–44)
MAGNESIUM SERPL-MCNC: 2.4 MG/DL (ref 1.9–2.7)
MCH RBC QN AUTO: 29.9 PG (ref 26.8–34.3)
MCHC RBC AUTO-ENTMCNC: 31 G/DL (ref 31.4–37.4)
MCV RBC AUTO: 97 FL (ref 82–98)
MONOCYTES # BLD AUTO: 0.9 THOUSAND/ÂΜL (ref 0.17–1.22)
MONOCYTES NFR BLD AUTO: 14 % (ref 4–12)
NEUTROPHILS # BLD AUTO: 4.2 THOUSANDS/ÂΜL (ref 1.85–7.62)
NEUTS SEG NFR BLD AUTO: 66 % (ref 43–75)
NRBC BLD AUTO-RTO: 1 /100 WBCS
PHOSPHATE SERPL-MCNC: 2.9 MG/DL (ref 2.3–4.1)
PLATELET # BLD AUTO: 127 THOUSANDS/UL (ref 149–390)
PMV BLD AUTO: 10.5 FL (ref 8.9–12.7)
POTASSIUM SERPL-SCNC: 4.7 MMOL/L (ref 3.5–5.3)
PROT SERPL-MCNC: 5.4 G/DL (ref 6.4–8.4)
RBC # BLD AUTO: 2.64 MILLION/UL (ref 3.81–5.12)
SODIUM SERPL-SCNC: 131 MMOL/L (ref 135–147)
UNIT DISPENSE STATUS: NORMAL
UNIT PRODUCT CODE: NORMAL
UNIT PRODUCT VOLUME: 300 ML
UNIT RH: NORMAL
WBC # BLD AUTO: 6.34 THOUSAND/UL (ref 4.31–10.16)

## 2023-10-06 PROCEDURE — 87102 FUNGUS ISOLATION CULTURE: CPT | Performed by: STUDENT IN AN ORGANIZED HEALTH CARE EDUCATION/TRAINING PROGRAM

## 2023-10-06 PROCEDURE — 94669 MECHANICAL CHEST WALL OSCILL: CPT

## 2023-10-06 PROCEDURE — 97535 SELF CARE MNGMENT TRAINING: CPT

## 2023-10-06 PROCEDURE — 87116 MYCOBACTERIA CULTURE: CPT | Performed by: STUDENT IN AN ORGANIZED HEALTH CARE EDUCATION/TRAINING PROGRAM

## 2023-10-06 PROCEDURE — 87147 CULTURE TYPE IMMUNOLOGIC: CPT | Performed by: STUDENT IN AN ORGANIZED HEALTH CARE EDUCATION/TRAINING PROGRAM

## 2023-10-06 PROCEDURE — 87252 VIRUS INOCULATION TISSUE: CPT | Performed by: STUDENT IN AN ORGANIZED HEALTH CARE EDUCATION/TRAINING PROGRAM

## 2023-10-06 PROCEDURE — 97530 THERAPEUTIC ACTIVITIES: CPT

## 2023-10-06 PROCEDURE — 99291 CRITICAL CARE FIRST HOUR: CPT | Performed by: INTERNAL MEDICINE

## 2023-10-06 PROCEDURE — 94640 AIRWAY INHALATION TREATMENT: CPT

## 2023-10-06 PROCEDURE — 94760 N-INVAS EAR/PLS OXIMETRY 1: CPT

## 2023-10-06 PROCEDURE — 85027 COMPLETE CBC AUTOMATED: CPT

## 2023-10-06 PROCEDURE — 94150 VITAL CAPACITY TEST: CPT

## 2023-10-06 PROCEDURE — 0B988ZZ DRAINAGE OF LEFT UPPER LOBE BRONCHUS, VIA NATURAL OR ARTIFICIAL OPENING ENDOSCOPIC: ICD-10-PCS | Performed by: INTERNAL MEDICINE

## 2023-10-06 PROCEDURE — 31622 DX BRONCHOSCOPE/WASH: CPT | Performed by: INTERNAL MEDICINE

## 2023-10-06 PROCEDURE — 94003 VENT MGMT INPAT SUBQ DAY: CPT

## 2023-10-06 PROCEDURE — 87206 SMEAR FLUORESCENT/ACID STAI: CPT | Performed by: STUDENT IN AN ORGANIZED HEALTH CARE EDUCATION/TRAINING PROGRAM

## 2023-10-06 PROCEDURE — 87070 CULTURE OTHR SPECIMN AEROBIC: CPT | Performed by: STUDENT IN AN ORGANIZED HEALTH CARE EDUCATION/TRAINING PROGRAM

## 2023-10-06 PROCEDURE — 99232 SBSQ HOSP IP/OBS MODERATE 35: CPT | Performed by: INTERNAL MEDICINE

## 2023-10-06 PROCEDURE — 84100 ASSAY OF PHOSPHORUS: CPT

## 2023-10-06 PROCEDURE — 80053 COMPREHEN METABOLIC PANEL: CPT

## 2023-10-06 PROCEDURE — 87205 SMEAR GRAM STAIN: CPT | Performed by: STUDENT IN AN ORGANIZED HEALTH CARE EDUCATION/TRAINING PROGRAM

## 2023-10-06 PROCEDURE — 0B9B8ZZ DRAINAGE OF LEFT LOWER LOBE BRONCHUS, VIA NATURAL OR ARTIFICIAL OPENING ENDOSCOPIC: ICD-10-PCS | Performed by: INTERNAL MEDICINE

## 2023-10-06 PROCEDURE — 87798 DETECT AGENT NOS DNA AMP: CPT | Performed by: STUDENT IN AN ORGANIZED HEALTH CARE EDUCATION/TRAINING PROGRAM

## 2023-10-06 PROCEDURE — 83735 ASSAY OF MAGNESIUM: CPT

## 2023-10-06 RX ORDER — MIDAZOLAM HYDROCHLORIDE 2 MG/2ML
INJECTION, SOLUTION INTRAMUSCULAR; INTRAVENOUS
Status: DISPENSED
Start: 2023-10-06 | End: 2023-10-07

## 2023-10-06 RX ORDER — FENTANYL CITRATE 50 UG/ML
INJECTION, SOLUTION INTRAMUSCULAR; INTRAVENOUS
Status: COMPLETED
Start: 2023-10-06 | End: 2023-10-06

## 2023-10-06 RX ORDER — LIDOCAINE HYDROCHLORIDE 10 MG/ML
INJECTION, SOLUTION EPIDURAL; INFILTRATION; INTRACAUDAL; PERINEURAL
Status: COMPLETED
Start: 2023-10-06 | End: 2023-10-06

## 2023-10-06 RX ORDER — FUROSEMIDE 10 MG/ML
20 INJECTION INTRAMUSCULAR; INTRAVENOUS ONCE
Status: COMPLETED | OUTPATIENT
Start: 2023-10-06 | End: 2023-10-06

## 2023-10-06 RX ORDER — MIDAZOLAM HYDROCHLORIDE 2 MG/2ML
INJECTION, SOLUTION INTRAMUSCULAR; INTRAVENOUS
Status: COMPLETED
Start: 2023-10-06 | End: 2023-10-06

## 2023-10-06 RX ADMIN — SODIUM CHLORIDE SOLN NEBU 3% 4 ML: 3 NEBU SOLN at 19:21

## 2023-10-06 RX ADMIN — BUSPIRONE HYDROCHLORIDE 30 MG: 10 TABLET ORAL at 16:58

## 2023-10-06 RX ADMIN — LEVALBUTEROL HYDROCHLORIDE 1.25 MG: 1.25 SOLUTION RESPIRATORY (INHALATION) at 02:20

## 2023-10-06 RX ADMIN — GABAPENTIN 300 MG: 300 CAPSULE ORAL at 09:06

## 2023-10-06 RX ADMIN — FUROSEMIDE 20 MG: 10 INJECTION, SOLUTION INTRAMUSCULAR; INTRAVENOUS at 22:47

## 2023-10-06 RX ADMIN — SODIUM CHLORIDE SOLN NEBU 3% 4 ML: 3 NEBU SOLN at 02:42

## 2023-10-06 RX ADMIN — LIDOCAINE HYDROCHLORIDE 300 MG: 10 INJECTION, SOLUTION EPIDURAL; INFILTRATION; INTRACAUDAL; PERINEURAL at 16:52

## 2023-10-06 RX ADMIN — GABAPENTIN 300 MG: 300 CAPSULE ORAL at 16:58

## 2023-10-06 RX ADMIN — FENTANYL CITRATE 25 MCG: 50 INJECTION INTRAMUSCULAR; INTRAVENOUS at 16:52

## 2023-10-06 RX ADMIN — NYSTATIN 500000 UNITS: 100000 SUSPENSION ORAL at 09:05

## 2023-10-06 RX ADMIN — IPRATROPIUM BROMIDE 0.5 MG: 0.5 SOLUTION RESPIRATORY (INHALATION) at 19:21

## 2023-10-06 RX ADMIN — NICOTINE 21 MG: 21 PATCH, EXTENDED RELEASE TRANSDERMAL at 09:05

## 2023-10-06 RX ADMIN — BUSPIRONE HYDROCHLORIDE 30 MG: 10 TABLET ORAL at 09:07

## 2023-10-06 RX ADMIN — CHLORHEXIDINE GLUCONATE 15 ML: 1.2 SOLUTION ORAL at 21:35

## 2023-10-06 RX ADMIN — OXYCODONE HYDROCHLORIDE 5 MG: 5 SOLUTION ORAL at 21:35

## 2023-10-06 RX ADMIN — FAMOTIDINE 20 MG: 20 TABLET, FILM COATED ORAL at 16:59

## 2023-10-06 RX ADMIN — GUAIFENESIN 200 MG: 200 SOLUTION ORAL at 21:35

## 2023-10-06 RX ADMIN — IPRATROPIUM BROMIDE 0.5 MG: 0.5 SOLUTION RESPIRATORY (INHALATION) at 02:20

## 2023-10-06 RX ADMIN — MIDAZOLAM HYDROCHLORIDE 1 MG: 1 INJECTION, SOLUTION INTRAMUSCULAR; INTRAVENOUS at 16:52

## 2023-10-06 RX ADMIN — IPRATROPIUM BROMIDE 0.5 MG: 0.5 SOLUTION RESPIRATORY (INHALATION) at 07:39

## 2023-10-06 RX ADMIN — SULFAMETHOXAZOLE AND TRIMETHOPRIM 1 TABLET: 800; 160 TABLET ORAL at 09:06

## 2023-10-06 RX ADMIN — SERTRALINE 75 MG: 25 TABLET, FILM COATED ORAL at 09:06

## 2023-10-06 RX ADMIN — BUSPIRONE HYDROCHLORIDE 30 MG: 10 TABLET ORAL at 21:36

## 2023-10-06 RX ADMIN — OXYCODONE HYDROCHLORIDE 5 MG: 5 SOLUTION ORAL at 05:20

## 2023-10-06 RX ADMIN — ACYCLOVIR 400 MG: 200 CAPSULE ORAL at 09:07

## 2023-10-06 RX ADMIN — FAMOTIDINE 20 MG: 20 TABLET, FILM COATED ORAL at 09:06

## 2023-10-06 RX ADMIN — GUAIFENESIN 200 MG: 200 SOLUTION ORAL at 09:05

## 2023-10-06 RX ADMIN — GUAIFENESIN 200 MG: 200 SOLUTION ORAL at 03:21

## 2023-10-06 RX ADMIN — GUAIFENESIN 200 MG: 200 SOLUTION ORAL at 16:58

## 2023-10-06 RX ADMIN — SODIUM CHLORIDE SOLN NEBU 3% 4 ML: 3 NEBU SOLN at 07:39

## 2023-10-06 RX ADMIN — OXYCODONE HYDROCHLORIDE 5 MG: 5 SOLUTION ORAL at 16:58

## 2023-10-06 RX ADMIN — CHLORHEXIDINE GLUCONATE 15 ML: 1.2 SOLUTION ORAL at 09:05

## 2023-10-06 RX ADMIN — ALLOPURINOL 300 MG: 100 TABLET ORAL at 09:06

## 2023-10-06 RX ADMIN — GABAPENTIN 300 MG: 300 CAPSULE ORAL at 21:36

## 2023-10-06 RX ADMIN — LEVALBUTEROL HYDROCHLORIDE 1.25 MG: 1.25 SOLUTION RESPIRATORY (INHALATION) at 07:38

## 2023-10-06 RX ADMIN — LEVALBUTEROL HYDROCHLORIDE 1.25 MG: 1.25 SOLUTION RESPIRATORY (INHALATION) at 13:11

## 2023-10-06 RX ADMIN — LEVOFLOXACIN 500 MG: 250 TABLET, FILM COATED ORAL at 09:06

## 2023-10-06 RX ADMIN — FLUCONAZOLE 400 MG: 100 TABLET ORAL at 09:06

## 2023-10-06 RX ADMIN — SODIUM CHLORIDE SOLN NEBU 3% 4 ML: 3 NEBU SOLN at 13:12

## 2023-10-06 RX ADMIN — LEVALBUTEROL HYDROCHLORIDE 1.25 MG: 1.25 SOLUTION RESPIRATORY (INHALATION) at 19:21

## 2023-10-06 RX ADMIN — ENOXAPARIN SODIUM 30 MG: 30 INJECTION SUBCUTANEOUS at 09:05

## 2023-10-06 RX ADMIN — IPRATROPIUM BROMIDE 0.5 MG: 0.5 SOLUTION RESPIRATORY (INHALATION) at 13:11

## 2023-10-06 RX ADMIN — QUETIAPINE FUMARATE 50 MG: 25 TABLET ORAL at 21:36

## 2023-10-06 NOTE — RESPIRATORY THERAPY NOTE
Bronched patient at bedside. Used 4cc lidocaine. Patient was on 100%. Patient tolerated procedure well. No issues.

## 2023-10-06 NOTE — PLAN OF CARE
Problem: OCCUPATIONAL THERAPY ADULT  Goal: Performs self-care activities at highest level of function for planned discharge setting. See evaluation for individualized goals. Description: Treatment Interventions: ADL retraining, Functional transfer training, Endurance training, Patient/family training, Equipment evaluation/education, Compensatory technique education, Activityengagement, Energy conservation          See flowsheet documentation for full assessment, interventions and recommendations. Outcome: Not Progressing  Note: Limitation: Decreased ADL status, Decreased UE strength, Decreased cognition, Decreased Safe judgement during ADL, Decreased endurance, Decreased self-care trans, Decreased high-level ADLs (+ pain, poor trunk control, balance, functional reach, activity tolerance)  Prognosis: Good  Assessment: Patient seen for OT treatment on 10/6/2023 s/p admission for Acute respiratory failure with hypoxia [facility]) Patient agreeable to OT session. Patient participated in toileting, self-care transfers, bed mobility  and functional mobility with intervention focus on functional independence and activity tolerance. Brea Whelan is continuing to perform below baseline due to the following deficits:  decreased muscular strength , acute change in mobility status , decreased balance , decreased standing tolerance for self care tasks , decreased activity tolerance  and (+) pain . Personal factors continuing to impact D/C include: Assistance needed for ADLs and functional mobility and High fall risk  From OT standpoint, patient would benefit from skilled intervention to maximize independence with ADLs and functional mobility. Goals remain appropriate, continue POC.  At this time, recommending D/C to: post-acute rehabilitation     OT Discharge Recommendation: Post acute rehabilitation services        Khadijah Magana OT

## 2023-10-06 NOTE — PROGRESS NOTES
Progress Note - Melissa Esquivel 69 y.o. female MRN: [id number]    Unit/Bed#: ICU 03 Encounter: [id number]      Assessment:  Ms. 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 Plan:  1. Neutropenia  Expected decrease in cell counts including white blood cells/neutrophils. Had been on filgrastim neutrophil support now with Nafisa coyle. Can stop supportive care with filgrastim. Can also stop prophylactic medications including Bactrim, fluconazole, and acyclovir. Continue to monitor daily CBCs with differential.    2. Aggressive B-cell lymphoma with MYC and Bcl-2  Newly diagnosed and status post 1 cycle of dose adjusted R-EPOCH. Will be due for cycle 2 on 10/10/2023. She will need set up for cycle 2 if she is discharged prior to cycle 2. If she is still here in the hospital, we will plan on giving her cycle 2 on 10/10/2023. We will continue to follow please call with issues or concerns. Subjective: With sleeping but easily arousable. States she is okay. States she has good days and bad days and how she is feeling. Overall okay. Is feeling sore. When asked if there is anything I can do to help make her feel better, she said no. Objective:     Vitals: Blood pressure 104/58, pulse 88, temperature 99.2 °F (37.3 °C), temperature source Oral, resp. rate 15, height 5' 1" (1.549 m), weight 81.9 kg (180 lb 8.9 oz), SpO2 95 %. ,Body mass index is 34.12 kg/m².       Intake/Output Summary (Last 24 hours) at 10/6/2023 1453  Last data filed at 10/6/2023 0800  Gross per 24 hour   Intake 3864.5 ml   Output 600 ml   Net 3264.5 ml       Physical Exam: /58   Pulse 88   Temp 99.2 °F (37.3 °C) (Oral)   Resp 15   Ht 5' 1" (1.549 m)   Wt 81.9 kg (180 lb 8.9 oz)   SpO2 95%   BMI 34.12 kg/m²   General appearance: alert and oriented, in no acute distress  Neck: trach in place  Lungs: no respiratory distress  Abdomen: not distended  Extremities: extremities normal, warm and well-perfused; no cyanosis, clubbing, or edema     Invasive Devices     Peripherally Inserted Central Catheter Line  Duration           PICC Line 95/87/81 Right Basilic 15 days          Peripheral Intravenous Line  Duration           Long-Dwell Peripheral IV (Midline) 75/71/07 Left Basilic 20 days          Drain  Duration           Gastrostomy/Enterostomy Percutaneous Endoscopic Gastrostomy (PEG) 16 Fr. LUQ 9 days          Airway  Duration           Surgical Airway Shiley Cuffed 19 days                Lab, Imaging and other studies: I have personally reviewed pertinent reports.     VTE Pharmacologic Prophylaxis: Enoxaparin (Lovenox)  VTE Mechanical Prophylaxis: sequential compression device     Maine Combs MD, PhD

## 2023-10-06 NOTE — PHYSICAL THERAPY NOTE
Physical Therapy Treatment Note    Patient's Name: Pal Watson  : 1955         10/06/23 1456   PT Last Visit   PT Visit Date 10/06/23   Note Type   Note Type Treatment   Pain Assessment   Pain Assessment Tool 0-10   Pain Score 4   Pain Location/Orientation Location: Abdomen; Location: Head   Restrictions/Precautions   Weight Bearing Precautions Per Order No   Other Precautions Fall Risk;Pain;Multiple lines;Telemetry;Limb alert  (vent/trach, tube feed)   General   Chart Reviewed Yes   Response to Previous Treatment Patient reporting fatigue but able to participate. Family/Caregiver Present Yes   Cognition   Comments pt ID by wristband, name and    Subjective   Subjective pt agreeable to PT treatment, states need to go to the bathroom   Bed Mobility   Supine to Sit 3  Moderate assistance   Additional items Assist x 1;Verbal cues;LE management; Increased time required  (trunk management)   Sit to Supine 3  Moderate assistance   Additional items Assist x 2; Increased time required;LE management;HOB elevated  (trunk management)   Additional Comments pt tolerates sitting EOB with close supervision, pt demonstrated ability to laterally scoot with supervision   Transfers   Sit to Stand 3  Moderate assistance   Additional items Assist x 1; Increased time required;Verbal cues   Stand to Sit 3  Moderate assistance   Additional items Assist x 1; Increased time required;Verbal cues   Stand pivot 3  Moderate assistance   Additional items Assist x 1; Increased time required;Verbal cues;Armrests   Toilet transfer 3  Moderate assistance   Additional items Assist x 1; Increased time required;Commode;Armrests   Additional Comments HHA, vc for sequencing of transfers, while sitting EOB pt states light headedness improves with time. Pt denies issues while sitting on bed side commode, post SPT to edge of bed, pt notes nausea, which slightly improves.  Pt performs x3 STS transfers and x2 SPT trasnfers at consistent mod x 1 assist   Ambulation/Elevation   Gait pattern Not appropriate   Balance   Static Sitting Fair +   Dynamic Sitting Fair   Static Standing Fair -   Dynamic Standing Poor +   Activity Tolerance   Activity Tolerance Patient limited by fatigue;Patient limited by pain;Treatment limited secondary to medical complications (Comment)  (nausea)   Medical Staff Made Aware care coordinated with [facility]   Nurse Made Aware TINO bland pre/post   Assessment   Prognosis Fair   Problem List Decreased strength; Impaired balance;Decreased endurance;Decreased mobility; Decreased cognition; Impaired judgement;Decreased safety awareness; Obesity; Decreased skin integrity;Pain   Assessment Pt seen for PT treatment today. Pt noting need to go to the bathroom. Pt performs all bed mobility and functional transfers with mod x 1 assist. Pt requires vc for sequencing and improved mechanics of transfers, with fair carryover. Pt did demonstrate ability to perform lateral scooting at eob with supervision. Overall pt is progressing slowly toward pt and PT goals. Would continue to emphasize OOB mobility, improved LE strength and endurance, progression of ambulation and challenge static and dynamic balance. Continue to reccomend post acute rehabilitation upon discharge. Goals   Patient Goals to go to the bathroom   STG Expiration Date 10/14/23   Short Term Goal #1 Patient will:  Increase bilateral LE strength 1/2 grade to facilitate independent mobility, Perform all bed mobility tasks w/ minx1 to improve pt's independence w/ repositioning for decrease risk of skin breakdown, Perform all transfers w/ minx1 consistently from various height surfaces in order to improve I w/ engagement w/ real-world environments/situations, Ambulate at least 10+ ft. with roller walker w/ minx1 w/o LOB to facilitate return and engagement w/ previous living environment, Increase ambulatory and static and dynamic standing balance 1 grade to decrease risk for falls, Tolerate at least 15 consecutive minutes of activity to demonstrate improved activity tolerance and endurance and Tolerate 3 hr OOB to faciliate upright tolerance   PT Treatment Day 2   Plan   Treatment/Interventions ADL retraining;Functional transfer training;LE strengthening/ROM; Elevations; Therapeutic exercise; Endurance training;Patient/family training;Equipment eval/education; Bed mobility;Gait training;Cognitive reorientation;Spoke to nursing;OT   Progress Progressing toward goals   PT Frequency 3-5x/wk   Recommendation   PT Discharge Recommendation Post acute rehabilitation services   AM-PAC Basic Mobility Inpatient   Turning in Flat Bed Without Bedrails 2   Lying on Back to Sitting on Edge of Flat Bed Without Bedrails 2   Moving Bed to Chair 2   Standing Up From Chair Using Arms 2   Walk in Room 1   Climb 3-5 Stairs With Railing 1   Basic Mobility Inpatient Raw Score 10   Highest Level Of Mobility   JH-HLM Goal 4: Move to chair/commode   JH-HLM Achieved 4: Move to [employer]   Education Provided Mobility training   Patient Reinforcement needed; Explanation/teachback used   End of Consult   Patient Position at End of Consult Bed/Chair alarm activated; All needs within reach; Supine   The patient's AM-PAC Basic Mobility Inpatient Short Form Raw Score is 10. A Raw score of less than or equal to 16 suggests the patient may benefit from discharge to post-acute rehabilitation services. Please also refer to the recommendation of the Physical Therapist for safe discharge planning.   Keshia Madden, PT

## 2023-10-06 NOTE — PLAN OF CARE
Problem: PHYSICAL THERAPY ADULT  Goal: Performs mobility at highest level of function for planned discharge setting. See evaluation for individualized goals. Description: Treatment/Interventions: Functional transfer training, LE strengthening/ROM, Elevations, Therapeutic exercise, Endurance training, Patient/family training, Equipment eval/education, Bed mobility, Gait training, Spoke to nursing, Spoke to case management          See flowsheet documentation for full assessment, interventions and recommendations. Note: Prognosis: Fair  Problem List: Decreased strength, Impaired balance, Decreased endurance, Decreased mobility, Decreased cognition, Impaired judgement, Decreased safety awareness, Obesity, Decreased skin integrity, Pain  Assessment: Pt seen for PT treatment today. Pt noting need to go to the bathroom. Pt performs all bed mobility and functional transfers with mod x 1 assist. Pt requires vc for sequencing and improved mechanics of transfers, with fair carryover. Pt did demonstrate ability to perform lateral scooting at eob with supervision. Overall pt is progressing slowly toward pt and PT goals. Would continue to emphasize OOB mobility, improved LE strength and endurance, progression of ambulation and challenge static and dynamic balance. Continue to reccomend post acute rehabilitation upon discharge. Barriers to Discharge: Inaccessible home environment, Decreased caregiver support (Pt is at home alone during the day; + PAUL her home)     PT Discharge Recommendation: Post acute rehabilitation services    See flowsheet documentation for full assessment.

## 2023-10-06 NOTE — OCCUPATIONAL THERAPY NOTE
Occupational Therapy Treatment Note     Patient Name: Laurie Grijalva  PZNNV'[id number] Date: 10/6/2023  Problem List  Principal Problem:    Acute respiratory failure with hypoxia secondary to oropharyngeal mass  Active Problems:    Benign essential hypertension    Pain syndrome, chronic    CKD (chronic kidney disease) stage 3, GFR 30-59 ml/min (HCC)    (HFpEF) heart failure with preserved ejection fraction (HCC)    Ambulatory dysfunction    Angioedema    Oropharyngeal mass    Blister (nonthermal) of left forearm, sequela    Large cell lymphoma (HCC)    Chronic Superficial thrombophlebitis    Generalized abdominal pain    Depression          10/06/23 1430   OT Last Visit   OT Visit Date 10/06/23   Note Type   Note Type Treatment   Pain Assessment   Pain Assessment Tool 0-10   Pain Score 4   Pain Location/Orientation Location: Abdomen   Effect of Pain on Daily Activities activity tolerance, comfort   Hospital Pain Intervention(s) Repositioned; Ambulation/increased activity; Emotional support   Multiple Pain Sites Yes   Pain 2   Pain Score 2 4   Pain Location/Orientation 2 Location: Head   Pain Onset/Description 2 Descriptor: Headache   Pain 3   Pain Location/Orientation 3 Location: Neck  (trach site)   Restrictions/Precautions   Weight Bearing Precautions Per Order No   Other Precautions Chair Alarm; Bed Alarm; Fall Risk;Pain;Multiple lines;Limb alert  (vent / trach, PEG tube, central line)   Lifestyle   Autonomy PTA pt living with daughter in [facility] with [address] [facility], pt requiring (A) with ADLs and IADLs, (+)falls, (-)drives use of quad cane vs RW at baseline   Reciprocal Relationships supportive daughter however daughter does work during the day   Service to Others retired   Intrinsic Gratification unable to state at this time   ADL   Eating Deficit   (tube feeds)   Grooming Assistance   (declined grooming/UB/LB bathing at this time)   [address]   (declined pants/underwear at this time)   [address] [facility]  3  Moderate Assistance   Toileting Deficit Perineal hygiene; Bedside commode; Increased time to complete;Setup;Verbal cueing;Supervison/safety  (total A pericare c Min A steadying of 2nd staff)   Bed Mobility   Supine to Sit 3  Moderate assistance   Additional items Assist x 1; Increased time required;Verbal cues;LE management  (trunk management)   Sit to Supine 3  Moderate assistance   Additional items Assist x 2; Increased time required;Verbal cues;LE management  (trunk management)   Additional Comments Sits EOB c close supervision, lateral scoot x2 c supervision c rest break intermittently   Transfers   Sit to Stand 3  Moderate assistance   Additional items Assist x 1; Increased time required;Verbal cues   Stand to Sit 3  Moderate assistance   Additional items Assist x 1; Increased time required;Verbal cues   Stand pivot 3  Moderate assistance   Additional items Assist x 1; Increased time required;Verbal cues   Toilet transfer 3  Moderate assistance   Additional items Assist x 1; Increased time required;Commode;Armrests   Additional Comments HHA utilized during transfers. Completed Mod A x1 , cues for sequencing through transfers. pt c/o lightheaded/dizziness intermittently throughout session. Declined further ambulation/transfers. Toilet Transfers   Toilet Transfer From [employer] Transfer Type To and from   Toilet Transfer to Standard bedside commode   Toilet Transfer Technique Stand pivot; To right   Toilet Transfers Moderate assistance;Verbal cues  (cues for use of arm rests and body mechanics)   Subjective   Subjective Pt with improved vocalizations today, still has difficulty intermittently and mouths words. Fatigued, but agreeable to session   Cognition   Overall Cognitive Status WFL  (higher level cognitive deficits suspected)   Arousal/Participation Responsive;Arousable; Cooperative   Attention Within functional limits   Orientation Level Oriented X4   Memory Decreased recall of recent events;Decreased recall of precautions   Following Commands Follows one step commands without difficulty   Comments Pt agreeable to OT sesison, intermittently irritable   Activity Tolerance   Activity Tolerance Patient limited by fatigue;Patient limited by pain  (nausea)   Medical Staff Made Aware RN [place] pre/post session, PT [place]   Assessment   Assessment Patient seen for OT treatment on 10/6/2023 s/p admission for Acute respiratory failure with hypoxia [facility]) Patient agreeable to OT session. Patient participated in toileting, self-care transfers, bed mobility  and functional mobility with intervention focus on functional independence and activity tolerance. Laurie Grijalva is continuing to perform below baseline due to the following deficits:  decreased muscular strength , acute change in mobility status , decreased balance , decreased standing tolerance for self care tasks , decreased activity tolerance  and (+) pain . Personal factors continuing to impact D/C include: Assistance needed for ADLs and functional mobility and High fall risk  From OT standpoint, patient would benefit from skilled intervention to maximize independence with ADLs and functional mobility. Goals remain appropriate, continue POC. At this time, recommending D/C to: post-acute rehabilitation   Plan   Treatment Interventions ADL retraining;Functional transfer training;UE strengthening/ROM; Endurance training;Patient/family training;Equipment evaluation/education; Compensatory technique education; Energy conservation   Goal Expiration Date 10/14/23   OT Treatment Day 2   OT Frequency 3-5x/wk   Recommendation   OT Discharge Recommendation Post acute rehabilitation services   Additional Comments  The patient's raw score on the AM-PAC Daily Activity Inpatient Short Form is 13. A raw score of less than 19 suggests the patient may benefit from discharge to post-acute rehabilitation services.  Please refer to the recommendation of the Occupational Therapist for safe discharge planning. AM-PAC Daily Activity Inpatient   Lower Body Dressing 2   Bathing 2   Toileting 2   Upper Body Dressing 3   Grooming 3   Eating 1  (PEG tube, anticipate 3)   Daily Activity Raw Score 13   Daily Activity Standardized Score (Calc for Raw Score >=11) 32.03   AM-PAC Applied Cognition Inpatient   Following a Speech/Presentation 3   Understanding Ordinary Conversation 4   Taking Medications 2   Remembering Where Things Are Placed or Put Away 3   Remembering List of 4-5 Errands 3   Taking Care of Complicated Tasks 2   Applied Cognition Raw Score 17   Applied Cognition Standardized Score 36.52   End of Consult   Education Provided Yes   Patient Position at End of Consult Bed/Chair alarm activated; All needs within reach; Supine  (declined OOB)   Nurse Communication Nurse aware of consult   GOALS:   Goals updated following re-evaluation in order to promote pt's established goal of going home     -Patient will perform grooming tasks in stance with overall Mod I in order to increase overall independence     -Patient will be supervision with UB dressing using AE and AD as needed in order to increase (I) with ADLs      -Patient will be supervision  with UB bathing using AE and AD as needed in order to increase (I) with ADLs     -Patient will be Min A with LB dressing with use of AE and AD as needed in order to increase (I) with ADLs      -Patient will be Min A with LB bathing with use of AE and AD as needed in order to increase (I) with ADLs     -Patient will complete toileting w/ supervision w/ G hygiene/thoroughness in order to reduce caregiver burden      -Patient will demonstrate supervision with bed mobility for ability to manage own comfort and initiate OOB tasks.      -Patient will perform functional transfers with supervision to/from all surfaces using DME as needed in order to increase (I) with functional tasks     -Patient will be supervision with functional mobility to/from bathroom for increased independence with toileting tasks     -Patient will independently integrate one pacing strategy into morning ADL's.     -Patient will demonstrate standing for 3 min in order to increase active participation in functional activities  Pt benefited from co-session of skilled OT and PT therapists in order to most appropriately address functional deficits d/t acute medical complexity , evolving medical status , and decreased activity tolerance. OT/PT objectives were addressed separately; please see PT note for specific goal areas targeted.          Kemi Lujan, OT

## 2023-10-07 LAB
ALBUMIN SERPL BCP-MCNC: 2.5 G/DL (ref 3.5–5)
ALP SERPL-CCNC: 299 U/L (ref 34–104)
ALT SERPL W P-5'-P-CCNC: 34 U/L (ref 7–52)
ANION GAP SERPL CALCULATED.3IONS-SCNC: 5 MMOL/L
ANISOCYTOSIS BLD QL SMEAR: PRESENT
AST SERPL W P-5'-P-CCNC: 32 U/L (ref 13–39)
BASOPHILS # BLD MANUAL: 0 THOUSAND/UL (ref 0–0.1)
BASOPHILS NFR MAR MANUAL: 0 % (ref 0–1)
BILIRUB SERPL-MCNC: 0.5 MG/DL (ref 0.2–1)
BUN SERPL-MCNC: 52 MG/DL (ref 5–25)
CALCIUM ALBUM COR SERPL-MCNC: 10.1 MG/DL (ref 8.3–10.1)
CALCIUM SERPL-MCNC: 8.9 MG/DL (ref 8.4–10.2)
CHLORIDE SERPL-SCNC: 93 MMOL/L (ref 96–108)
CO2 SERPL-SCNC: 34 MMOL/L (ref 21–32)
CREAT SERPL-MCNC: 1.9 MG/DL (ref 0.6–1.3)
EOSINOPHIL # BLD MANUAL: 0 THOUSAND/UL (ref 0–0.4)
EOSINOPHIL NFR BLD MANUAL: 0 % (ref 0–6)
ERYTHROCYTE [DISTWIDTH] IN BLOOD BY AUTOMATED COUNT: 17.2 % (ref 11.6–15.1)
GFR SERPL CREATININE-BSD FRML MDRD: 26 ML/MIN/1.73SQ M
GLUCOSE SERPL-MCNC: 117 MG/DL (ref 65–140)
HCT VFR BLD AUTO: 26 % (ref 34.8–46.1)
HGB BLD-MCNC: 8.1 G/DL (ref 11.5–15.4)
LYMPHOCYTES # BLD AUTO: 1.1 THOUSAND/UL (ref 0.6–4.47)
LYMPHOCYTES # BLD AUTO: 9 % (ref 14–44)
MAGNESIUM SERPL-MCNC: 2.4 MG/DL (ref 1.9–2.7)
MCH RBC QN AUTO: 30.2 PG (ref 26.8–34.3)
MCHC RBC AUTO-ENTMCNC: 31.2 G/DL (ref 31.4–37.4)
MCV RBC AUTO: 97 FL (ref 82–98)
METAMYELOCYTES NFR BLD MANUAL: 12 % (ref 0–1)
MONOCYTES # BLD AUTO: 1.23 THOUSAND/UL (ref 0–1.22)
MONOCYTES NFR BLD: 10 % (ref 4–12)
MYELOCYTES NFR BLD MANUAL: 3 % (ref 0–1)
NEUTROPHILS # BLD MANUAL: 8.09 THOUSAND/UL (ref 1.85–7.62)
NEUTS BAND NFR BLD MANUAL: 16 % (ref 0–8)
NEUTS SEG NFR BLD AUTO: 50 % (ref 43–75)
PHOSPHATE SERPL-MCNC: 3.2 MG/DL (ref 2.3–4.1)
PLATELET # BLD AUTO: 147 THOUSANDS/UL (ref 149–390)
PLATELET BLD QL SMEAR: ABNORMAL
PMV BLD AUTO: 10.2 FL (ref 8.9–12.7)
POTASSIUM SERPL-SCNC: 5.1 MMOL/L (ref 3.5–5.3)
PROT SERPL-MCNC: 5.4 G/DL (ref 6.4–8.4)
RBC # BLD AUTO: 2.68 MILLION/UL (ref 3.81–5.12)
RBC MORPH BLD: PRESENT
RHODAMINE-AURAMINE STN SPEC: NORMAL
SODIUM SERPL-SCNC: 132 MMOL/L (ref 135–147)
URATE SERPL-MCNC: 3.1 MG/DL (ref 2–7.5)
WBC # BLD AUTO: 12.26 THOUSAND/UL (ref 4.31–10.16)

## 2023-10-07 PROCEDURE — 94150 VITAL CAPACITY TEST: CPT

## 2023-10-07 PROCEDURE — 94003 VENT MGMT INPAT SUBQ DAY: CPT

## 2023-10-07 PROCEDURE — 85027 COMPLETE CBC AUTOMATED: CPT

## 2023-10-07 PROCEDURE — 80053 COMPREHEN METABOLIC PANEL: CPT

## 2023-10-07 PROCEDURE — 85007 BL SMEAR W/DIFF WBC COUNT: CPT

## 2023-10-07 PROCEDURE — 94640 AIRWAY INHALATION TREATMENT: CPT

## 2023-10-07 PROCEDURE — 84550 ASSAY OF BLOOD/URIC ACID: CPT

## 2023-10-07 PROCEDURE — 94760 N-INVAS EAR/PLS OXIMETRY 1: CPT

## 2023-10-07 PROCEDURE — 99291 CRITICAL CARE FIRST HOUR: CPT | Performed by: INTERNAL MEDICINE

## 2023-10-07 PROCEDURE — 83735 ASSAY OF MAGNESIUM: CPT

## 2023-10-07 PROCEDURE — 84100 ASSAY OF PHOSPHORUS: CPT

## 2023-10-07 RX ORDER — FUROSEMIDE 10 MG/ML
40 INJECTION INTRAMUSCULAR; INTRAVENOUS ONCE
Status: COMPLETED | OUTPATIENT
Start: 2023-10-07 | End: 2023-10-07

## 2023-10-07 RX ORDER — FUROSEMIDE 10 MG/ML
40 INJECTION INTRAMUSCULAR; INTRAVENOUS ONCE
Status: DISCONTINUED | OUTPATIENT
Start: 2023-10-07 | End: 2023-10-07

## 2023-10-07 RX ADMIN — NICOTINE 21 MG: 21 PATCH, EXTENDED RELEASE TRANSDERMAL at 09:01

## 2023-10-07 RX ADMIN — CHLORHEXIDINE GLUCONATE 15 ML: 1.2 SOLUTION ORAL at 20:22

## 2023-10-07 RX ADMIN — GUAIFENESIN 200 MG: 200 SOLUTION ORAL at 15:30

## 2023-10-07 RX ADMIN — IPRATROPIUM BROMIDE 0.5 MG: 0.5 SOLUTION RESPIRATORY (INHALATION) at 08:24

## 2023-10-07 RX ADMIN — SODIUM CHLORIDE SOLN NEBU 3% 4 ML: 3 NEBU SOLN at 01:06

## 2023-10-07 RX ADMIN — GUAIFENESIN 200 MG: 200 SOLUTION ORAL at 09:02

## 2023-10-07 RX ADMIN — OXYCODONE HYDROCHLORIDE 5 MG: 5 SOLUTION ORAL at 15:30

## 2023-10-07 RX ADMIN — LEVALBUTEROL HYDROCHLORIDE 1.25 MG: 1.25 SOLUTION RESPIRATORY (INHALATION) at 14:20

## 2023-10-07 RX ADMIN — GUAIFENESIN 200 MG: 200 SOLUTION ORAL at 02:39

## 2023-10-07 RX ADMIN — ENOXAPARIN SODIUM 30 MG: 30 INJECTION SUBCUTANEOUS at 09:00

## 2023-10-07 RX ADMIN — GABAPENTIN 300 MG: 300 CAPSULE ORAL at 15:30

## 2023-10-07 RX ADMIN — ACETAMINOPHEN 650 MG: 325 TABLET, FILM COATED ORAL at 15:30

## 2023-10-07 RX ADMIN — BUSPIRONE HYDROCHLORIDE 30 MG: 10 TABLET ORAL at 15:31

## 2023-10-07 RX ADMIN — IPRATROPIUM BROMIDE 0.5 MG: 0.5 SOLUTION RESPIRATORY (INHALATION) at 14:20

## 2023-10-07 RX ADMIN — SERTRALINE 75 MG: 25 TABLET, FILM COATED ORAL at 09:01

## 2023-10-07 RX ADMIN — IPRATROPIUM BROMIDE 0.5 MG: 0.5 SOLUTION RESPIRATORY (INHALATION) at 20:02

## 2023-10-07 RX ADMIN — LEVALBUTEROL HYDROCHLORIDE 1.25 MG: 1.25 SOLUTION RESPIRATORY (INHALATION) at 08:24

## 2023-10-07 RX ADMIN — GABAPENTIN 300 MG: 300 CAPSULE ORAL at 09:01

## 2023-10-07 RX ADMIN — BUSPIRONE HYDROCHLORIDE 30 MG: 10 TABLET ORAL at 09:03

## 2023-10-07 RX ADMIN — IPRATROPIUM BROMIDE 0.5 MG: 0.5 SOLUTION RESPIRATORY (INHALATION) at 01:06

## 2023-10-07 RX ADMIN — FUROSEMIDE 40 MG: 10 INJECTION, SOLUTION INTRAMUSCULAR; INTRAVENOUS at 23:51

## 2023-10-07 RX ADMIN — SODIUM CHLORIDE SOLN NEBU 3% 4 ML: 3 NEBU SOLN at 14:20

## 2023-10-07 RX ADMIN — OXYCODONE HYDROCHLORIDE 5 MG: 5 SOLUTION ORAL at 20:22

## 2023-10-07 RX ADMIN — SODIUM CHLORIDE SOLN NEBU 3% 4 ML: 3 NEBU SOLN at 08:25

## 2023-10-07 RX ADMIN — LEVALBUTEROL HYDROCHLORIDE 1.25 MG: 1.25 SOLUTION RESPIRATORY (INHALATION) at 01:06

## 2023-10-07 RX ADMIN — FAMOTIDINE 20 MG: 20 TABLET, FILM COATED ORAL at 17:09

## 2023-10-07 RX ADMIN — CHLORHEXIDINE GLUCONATE 15 ML: 1.2 SOLUTION ORAL at 09:00

## 2023-10-07 RX ADMIN — OXYCODONE HYDROCHLORIDE 5 MG: 5 SOLUTION ORAL at 05:16

## 2023-10-07 RX ADMIN — QUETIAPINE FUMARATE 50 MG: 25 TABLET ORAL at 23:09

## 2023-10-07 RX ADMIN — GUAIFENESIN 200 MG: 200 SOLUTION ORAL at 20:22

## 2023-10-07 RX ADMIN — FAMOTIDINE 20 MG: 20 TABLET, FILM COATED ORAL at 09:01

## 2023-10-07 RX ADMIN — OXYCODONE HYDROCHLORIDE 5 MG: 5 SOLUTION ORAL at 23:10

## 2023-10-07 RX ADMIN — ACETAMINOPHEN 650 MG: 325 TABLET, FILM COATED ORAL at 09:01

## 2023-10-07 RX ADMIN — BUSPIRONE HYDROCHLORIDE 30 MG: 10 TABLET ORAL at 20:21

## 2023-10-07 RX ADMIN — OXYCODONE HYDROCHLORIDE 5 MG: 5 SOLUTION ORAL at 09:00

## 2023-10-07 RX ADMIN — SODIUM CHLORIDE SOLN NEBU 3% 4 ML: 3 NEBU SOLN at 20:02

## 2023-10-07 RX ADMIN — GABAPENTIN 300 MG: 300 CAPSULE ORAL at 20:22

## 2023-10-07 RX ADMIN — LEVALBUTEROL HYDROCHLORIDE 1.25 MG: 1.25 SOLUTION RESPIRATORY (INHALATION) at 20:02

## 2023-10-07 NOTE — QUICK NOTE
Updated patient's daughter on the phone. She is in the hospital but patient does not really want to discuss her conditions right now so answered questions and provided updates via the phone.

## 2023-10-07 NOTE — PLAN OF CARE
Problem: MOBILITY - ADULT  Goal: Maintain or return to baseline ADL function  Description: INTERVENTIONS:  -  Assess patient's ability to carry out ADLs; assess patient's baseline for ADL function and identify physical deficits which impact ability to perform ADLs (bathing, care of mouth/teeth, toileting, grooming, dressing, etc.)  - Assess/evaluate cause of self-care deficits   - Assess range of motion  - Assess patient's mobility; develop plan if impaired  - Assess patient's need for assistive devices and provide as appropriate  - Encourage maximum independence but intervene and supervise when necessary  - Involve family in performance of ADLs  - Assess for home care needs following discharge   - Consider OT consult to assist with ADL evaluation and planning for discharge  - Provide patient education as appropriate  Outcome: Progressing  Goal: Maintains/Returns to pre admission functional level  Description: INTERVENTIONS:  - Perform BMAT or MOVE assessment daily.   - Set and communicate daily mobility goal to care team and patient/family/caregiver. - Collaborate with rehabilitation services on mobility goals if consulted  - Perform Range of Motion 3 times a day. - Reposition patient every 2 hours.   - Dangle patient 3 times a day  - Stand patient 3 times a day  - Ambulate patient 3 times a day  - Out of bed to chair 3 times a day   - Out of bed for meals 3 times a day  - Out of bed for toileting  - Record patient progress and toleration of activity level   Outcome: Progressing     Problem: Prexisting or High Potential for Compromised Skin Integrity  Goal: Skin integrity is maintained or improved  Description: INTERVENTIONS:  - Identify patients at risk for skin breakdown  - Assess and monitor skin integrity  - Assess and monitor nutrition and hydration status  - Monitor labs   - Assess for incontinence   - Turn and reposition patient  - Assist with mobility/ambulation  - Relieve pressure over bony prominences  - Avoid friction and shearing  - Provide appropriate hygiene as needed including keeping skin clean and dry  - Evaluate need for skin moisturizer/barrier cream  - Collaborate with interdisciplinary team   - Patient/family teaching  - Consider wound care consult   Outcome: Progressing     Problem: Nutrition/Hydration-ADULT  Goal: Nutrient/Hydration intake appropriate for improving, restoring or maintaining nutritional needs  Description: Monitor and assess patient's nutrition/hydration status for malnutrition. Collaborate with interdisciplinary team and initiate plan and interventions as ordered. Monitor patient's weight and dietary intake as ordered or per policy. Utilize nutrition screening tool and intervene as necessary. Determine patient's food preferences and provide high-protein, high-caloric foods as appropriate.      INTERVENTIONS:  - Monitor oral intake, urinary output, labs, and treatment plans  - Assess nutrition and hydration status and recommend course of action  - Evaluate amount of meals eaten  - Assist patient with eating if necessary   - Allow adequate time for meals  - Recommend/ encourage appropriate diets, oral nutritional supplements, and vitamin/mineral supplements  - Order, calculate, and assess calorie counts as needed  - Recommend, monitor, and adjust tube feedings and TPN/PPN based on assessed needs  - Assess need for intravenous fluids  - Provide specific nutrition/hydration education as appropriate  - Include patient/family/caregiver in decisions related to nutrition  Outcome: Progressing 23-Oct-2017

## 2023-10-07 NOTE — PROGRESS NOTES
[id number]  Progress Note: Critical Care  Name: Katty Maharaj 69 y.o. female I MRN: [id number]  Unit/Bed#: ICU 03 I Date of Admission: 9/13/2023   Date of Service: 10/7/2023 I Hospital Day: 24    Assessment/Plan   * Acute respiratory failure with hypoxia secondary to oropharyngeal mass  Assessment & Plan  Cuffless trach placed 9/17, transitioned to cuffed on 10/3. Oxygen requirements not improving. For mental health patient is on buspirone, quetiapine, and sertraline. For pain, gabapentin, oxycodone scheduled and prn, and hydromorphone prn. On Guaifenesin, Atrovent and Xopenex for airway maintenance. No improvement in bilateral consolidation or atelectasis and groundglass opacities on repeat CT and chest x-rays. No change since bronchoscopy yesterday. 14/10/80% satting 94%     Plan:  • Attempt to wean O2 requirements  • PCP PCR, AFB culture and stain, bronc culture and stain, viral culture, fungal culture from bronchoscopy all pending    Large cell lymphoma [facility])  Assessment & Plan  Biopsy on 9/17: Large B-cell lymphoma, stage II. First inpatient chemotherapy 9/22/2023- with R-EPOCH. 9/27 Rituxan. 9/28 Filgrastim. On acyclovir, Zyloprim, Diflucan, Levaquin, Zofran, Bactrim for chemo prophylaxis, all discontinued as Shawallidean reached 4200 yesterday with heme-onc's approval. Todays CBC WBC up from 6.34 to 12.26. Low-grade fever of 100 yesterday about an hour and a half after bronchoscopy. I suspect this fever was just reactive and that the leukocytosis is just secondary to the filgrastim which was just stopped yesterday.     Plan:  · Inpatient and outpatient hematology oncology following, next cycle starts 10/10  · Monitor ANC on CBC   · Monitor CMP for tumor lysis syndrome  · Repeat BCx if patient spikes another fever    (HFpEF) heart failure with preserved ejection fraction [facility])  Assessment & Plan  Wt Readings from Last 3 Encounters:   10/07/23 81.6 kg (179 lb 14.3 oz)   09/07/23 74.6 kg (164 lb 6.4 oz)   08/30/23 73.3 kg (161 lb 9.6 oz)     Lab Results   Component Value Date    LVEF 60 08/28/2023     (H) 09/29/2023     (H) 09/13/2023     Echo 8/28/23: LVEF 60%. CXR 9/22: Persistent pulmonary venous congestion with small effusions and bibasilar atelectasis. Patient has not responded well to Lasix 40 or Bumex 2. Baseline creatinine around 0.8, yesterday 2.07 but today 1.9 so likely plateauing PO. Patient received 20 of Lasix overnight responded well.     Plan:  • BMP, magnesium; Goal Mg > 2 and K > 4; Replete prn  • HOB > 30°, Daily standing weights, Measure I/O        Depression  Assessment & Plan  Patient on Zoloft 75 daily and Seroquel evening. Patient is depressed, met with palliative 10/5 for goals of care. Patient was firm that she wants to live and to fight, she is just tired. Generalized abdominal pain  Assessment & Plan  Patient reports abdominal pain is mainly in the epigastric area. Intermittently with coughing. Takes Famotidine for GERD at home. Alk phos 10/2 145, today 299.     - Continue Famotidine  - On bowel regimen with Senna and Miralax prn    Chronic Superficial thrombophlebitis  Assessment & Plan  Right forearm soreness. BUE ultrasound in setting of high risk of DVT. RUE US[de-identified] No evidence of acute or chronic deep vein thrombosis. 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 Lovenox 40 no longer appropriate with PO. As PO is trending down we can consider going back up to Lovenox 40, will reevaluate after creatinine drops below 1.5    -Continue DVT ppx with Lovenox 30    Blister (nonthermal) of left forearm, sequela  Assessment & Plan  Blisters of LUE healing well, no signs of infection. Continue daily wound care    Oropharyngeal mass  Assessment & Plan  9/14 Neck/ soft tissue CT: Large enhancing soft tissue mass identified within the left neck involving multiple spaces.  Centered within the left oropharynx with extensions as described above into multiple spaces. This results in significant airway compromise. 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 Small level 3 and level 4 nodes are seen within the neck. Tracheostomy by ENT, bx & addition testing performed. Replaced on 9/26. H/o tobacco abuse, daily smoker. On nicotine while inpatient    Plan:  · ENT and heme onc following  · See acute respiratory failure and large B cell lymphoma above      Angioedema  Assessment & Plan  POA with acute respiratory failure, SOB. On physical in [place] ED: Swollen tongue, swelling soft and hard palate and almost the head of all phalanx is completely closed. Severe angioedema. Decadron 10 mg, Benadryl 25 mg given. Initially refused but eventually agreed with intubation. 2/2 oropharyngeal mass compression. Plan:  • Cuffless trach placed 9/17, transitioned to cuffed 10/3  • Tolerating trach without issue  • See acute respiratory failure with hypoxia secondary to oropharyngeal mass above    Ambulatory dysfunction  Assessment & Plan  Impacted by chronic pain syndrome, but medication regimen may also contribute. Patient currently requires tracheostomy with ICU level care. Will require PT/ OT eval before discharge. CKD (chronic kidney disease) stage 3, GFR 30-59 ml/min [facility])  Assessment & Plan  Lab Results   Component Value Date    EGFR 26 10/07/2023    EGFR 23 10/06/2023    EGFR 24 10/05/2023    CREATININE 1.90 (H) 10/07/2023    CREATININE 2.07 (H) 10/06/2023    CREATININE 2.00 (H) 10/05/2023     Baseline Cr appears to be between 0.75-1.1. Patient received 2 doses of Bumex IV 2 g yesterday as she had not been responding appropriately to IV 40 Lasix daily. Creatinine went up by 0.5 meeting criteria for an PO. This may be prerenal intrinsic or postrenal.  Potential prerenal causes include reduced kidney perfusion due to lisinopril.   Intrinsic can also be lisinopril due to ATN, AIN from chemo medications, TLS, crystaline nephropathy from acyclovir, rituxan nephrotoxicity, filgrastim glomerulonephritis. Post renal obstructive could be from urine retention from vincristine, cyclophosphamide bladder fibrosis. Suspect most likely due to medications as a combination of prerenal and intrinsic. Cr up to 2 today. FENa was 0.2%, UA shows no casts or signs of infection, urine eosinophils 0%. Patient likely has prerenal PO from volume depletion. Received 2 L NS and 1 pRBC and cr went up by 0.07 still and Na and Cl went down, suspect that volume prevented further cr rise and free water excretion impaired by kidney function, allowing hyponatremia to increase. Creatinine increase is likely plateaued. Went down today. Patient did receive 20 of Lasix overnight for volume overload which she responded well to. Suspect that now that nephrotoxic medications have been stopped she will continue to respond well to Lasix and creatinine will continue to downtrend    - Lasix 20 IV twice daily    Pain syndrome, chronic  Assessment & Plan  Home regimen: Oxycodone 5 mg twice daily prn. Tylenol 650 mg every 8 hours prn. Gabapentin 600 mg 3 times daily. Medical marijuana. Lumbar radiculopathy. Impaired ambulatory dysfunction second to pain. Patient is taking opioids regularly for pain, has bowel regimen and is having bowel movements daily. Patient has more pain today secondary to bronchoscopy yesterday, specifically in her throat and chest.    Plan:  · Continue gabapentin 300 mg 3 times daily, oxycodone 5 mg 3 times daily, prn Tylenol 650 mg every 6 hours. Benign essential hypertension  Assessment & Plan  Takes Coreg 12.5 mg BID, Amlodipine 10 mg daily, and lisinopril 40 mg daily all discontinued at this time secondary to hypotension and PO. If blood pressure is elevated today, we will restart Coreg only. Plan:  · PRN hydralazine if BP >160.     Hyperlipidemia-resolved as of 10/2/2023  Assessment & Plan  Lab Results   Component Value Date    CHOLESTEROL 203 (H) 01/24/2023 CHOLESTEROL 177 08/11/2022    CHOLESTEROL 184 07/08/2020     Lab Results   Component Value Date    HDL 45 (L) 01/24/2023    HDL 37 (L) 08/11/2022    HDL 44 (L) 07/08/2020     Lab Results   Component Value Date    TRIG 166 (H) 09/16/2023    TRIG 160 (H) 01/24/2023    TRIG 108 08/11/2022     Lab Results   Component Value Date    NONHDLC 146 07/12/2018    [address] 207 (H) 04/29/2013     Continue outpatient diet and lifestyle modification. ICU Core Measures     A: Assess, Prevent, and Manage Pain · Has pain been assessed? Yes  · Need for changes to pain regimen? Yes   B: Both SAT/SAT  · N/A   C: Choice of Sedation · RASS Goal: N/A patient not on sedation  · Need for changes to sedation or analgesia regimen? No   D: Delirium · CAM-ICU: Negative   E: Early Mobility  · Plan for early mobility? No   F: Family Engagement · Plan for family engagement today? No       Antibiotic Review: Discontinued yesterday    Review of Invasive Devices:            Prophylaxis:  VTE VTE covered by:  enoxaparin, Subcutaneous, 30 mg at 10/06/23 0905       Stress Ulcer  covered byfamotidine (PEPCID) oral suspension 40 mg [[id number]], pantoprazole (PROTONIX) injection 40 mg [[id number]]          Subjective   HPI/24hr events:   Patient was bronched in the evening. Overnight patient received 20 of Lasix for volume overload and responded quite well. This morning she reports that she feels pretty good but does feel that her throat and chest are a bit more sore since bronchoscopy. Review of Systems   Constitutional: Positive for fever. Negative for chills. HENT: Positive for sore throat. Respiratory: Positive for cough. Negative for shortness of breath. Cardiovascular: Negative for chest pain, palpitations and leg swelling. Gastrointestinal: Positive for abdominal pain.           Objective                            Vitals I/O      Most Recent Min/Max in 24hrs   Temp 99.5 °F (37.5 °C) Temp  Min: 98.9 °F (37.2 °C)  Max: 100 °F (37.8 °C)   Pulse 97 Pulse  Min: 80  Max: 97   Resp 15 Resp  Min: 8  Max: 22   /56 BP  Min: 90/51  Max: 145/71   O2 Sat (!) 87 % SpO2  Min: 87 %  Max: 99 %      Intake/Output Summary (Last 24 hours) at 10/7/2023 0742  Last data filed at 10/7/2023 0600  Gross per 24 hour   Intake 1932 ml   Output 1060 ml   Net 872 ml         Diet Enteral/Parenteral; Tube Feeding No Oral Diet; Jevity 1.5; Continuous; 50; Every 4 hours     Invasive Monitoring Physical exam    Physical Exam  Constitutional:       General: She is not in acute distress. Appearance: She is not ill-appearing. HENT:      Head: Normocephalic and atraumatic. Cardiovascular:      Rate and Rhythm: Normal rate and regular rhythm. Heart sounds: Normal heart sounds. No murmur heard. Pulmonary:      Effort: Pulmonary effort is normal.      Breath sounds: Normal breath sounds. No rales. Chest:      Chest wall: No tenderness. Abdominal:      Palpations: Abdomen is soft. Tenderness: There is abdominal tenderness. There is no guarding. Musculoskeletal:      Right lower leg: No edema. Left lower leg: No edema. Skin:     Coloration: Skin is not jaundiced or pale. Neurological:      Mental Status: She is alert and oriented to person, place, and time.            Diagnostic Studies      Pending microbiology studies from bronchoscopy     Medications:  Scheduled PRN   busPIRone, 30 mg, TID  chlorhexidine, 15 mL, Q12H ASHLEY  enoxaparin, 30 mg, Q24H [facility]  famotidine, 20 mg, BID  gabapentin, 300 mg, TID  guaiFENesin, 200 mg, Q6H  levalbuterol, 1.25 mg, Q6H   And  ipratropium, 0.5 mg, Q6H  nicotine, 21 mg, Daily  oxyCODONE, 5 mg, Q8H  QUEtiapine, 50 mg, HS  senna, 8.8 mg, HS  sertraline, 75 mg, Daily  sodium chloride, 4 mL, Q6H      acetaminophen, 650 mg, Q6H PRN  HYDROmorphone, 0.2 mg, Q6H PRN  levalbuterol, 1.25 mg, Q8H PRN  ondansetron, 4 mg, Q8H PRN  oxyCODONE, 2.5 mg, Q4H PRN   Or  oxyCODONE, 5 mg, Q4H PRN  polyethylene glycol, 17 g, Daily PRN       Continuous          Labs:    CBC    Recent Labs     10/06/23  0524 10/07/23  0444   WBC 6.34 12.26*   HGB 7.9* 8.1*   HCT 25.5* 26.0*   * 147*     BMP    Recent Labs     10/06/23  0524 10/07/23  0444   SODIUM 131* 132*   K 4.7 5.1   CL 93* 93*   CO2 32 34*   AGAP 6 5   BUN 53* 52*   CREATININE 2.07* 1.90*   CALCIUM 8.6 8.9       Coags    No recent results     Additional Electrolytes  Recent Labs     10/06/23  0524 10/07/23  0444   MG 2.4 2.4   PHOS 2.9 3.2          Blood Gas    No recent results  No recent results LFTs  Recent Labs     10/06/23  0524 10/07/23  0444   ALT 34 34   AST 28 32   ALKPHOS 275* 299*   ALB 2.5* 2.5*   TBILI 0.71 0.50       Infectious  No recent results  Glucose  Recent Labs     10/06/23  0524 10/07/23  0444   GLUC [facility]                   Junito Jay MD

## 2023-10-08 ENCOUNTER — APPOINTMENT (INPATIENT)
Dept: RADIOLOGY | Facility: HOSPITAL | Age: 68
DRG: 004 | End: 2023-10-08
Payer: COMMERCIAL

## 2023-10-08 LAB
ALBUMIN SERPL BCP-MCNC: 2.5 G/DL (ref 3.5–5)
ALP SERPL-CCNC: 275 U/L (ref 34–104)
ALT SERPL W P-5'-P-CCNC: 31 U/L (ref 7–52)
ANION GAP SERPL CALCULATED.3IONS-SCNC: 4 MMOL/L
ANION GAP SERPL CALCULATED.3IONS-SCNC: 5 MMOL/L
AST SERPL W P-5'-P-CCNC: 25 U/L (ref 13–39)
BILIRUB SERPL-MCNC: 0.42 MG/DL (ref 0.2–1)
BUN SERPL-MCNC: 51 MG/DL (ref 5–25)
BUN SERPL-MCNC: 51 MG/DL (ref 5–25)
CA-I BLD-SCNC: 1.2 MMOL/L (ref 1.12–1.32)
CALCIUM ALBUM COR SERPL-MCNC: 10.4 MG/DL (ref 8.3–10.1)
CALCIUM SERPL-MCNC: 9.2 MG/DL (ref 8.4–10.2)
CALCIUM SERPL-MCNC: 9.5 MG/DL (ref 8.4–10.2)
CHLORIDE SERPL-SCNC: 93 MMOL/L (ref 96–108)
CHLORIDE SERPL-SCNC: 94 MMOL/L (ref 96–108)
CO2 SERPL-SCNC: 37 MMOL/L (ref 21–32)
CO2 SERPL-SCNC: 38 MMOL/L (ref 21–32)
CREAT SERPL-MCNC: 1.64 MG/DL (ref 0.6–1.3)
CREAT SERPL-MCNC: 1.72 MG/DL (ref 0.6–1.3)
ERYTHROCYTE [DISTWIDTH] IN BLOOD BY AUTOMATED COUNT: 17.2 % (ref 11.6–15.1)
GFR SERPL CREATININE-BSD FRML MDRD: 29 ML/MIN/1.73SQ M
GFR SERPL CREATININE-BSD FRML MDRD: 31 ML/MIN/1.73SQ M
GLUCOSE SERPL-MCNC: 119 MG/DL (ref 65–140)
GLUCOSE SERPL-MCNC: 131 MG/DL (ref 65–140)
HCT VFR BLD AUTO: 26.4 % (ref 34.8–46.1)
HGB BLD-MCNC: 8.3 G/DL (ref 11.5–15.4)
MAGNESIUM SERPL-MCNC: 2.3 MG/DL (ref 1.9–2.7)
MCH RBC QN AUTO: 31.1 PG (ref 26.8–34.3)
MCHC RBC AUTO-ENTMCNC: 31.4 G/DL (ref 31.4–37.4)
MCV RBC AUTO: 99 FL (ref 82–98)
PHOSPHATE SERPL-MCNC: 3.7 MG/DL (ref 2.3–4.1)
PLATELET # BLD AUTO: 155 THOUSANDS/UL (ref 149–390)
PMV BLD AUTO: 10.1 FL (ref 8.9–12.7)
POTASSIUM SERPL-SCNC: 5.2 MMOL/L (ref 3.5–5.3)
POTASSIUM SERPL-SCNC: 5.3 MMOL/L (ref 3.5–5.3)
PROT SERPL-MCNC: 5.6 G/DL (ref 6.4–8.4)
RBC # BLD AUTO: 2.67 MILLION/UL (ref 3.81–5.12)
SODIUM SERPL-SCNC: 135 MMOL/L (ref 135–147)
SODIUM SERPL-SCNC: 136 MMOL/L (ref 135–147)
WBC # BLD AUTO: 13.06 THOUSAND/UL (ref 4.31–10.16)

## 2023-10-08 PROCEDURE — 84100 ASSAY OF PHOSPHORUS: CPT

## 2023-10-08 PROCEDURE — 94760 N-INVAS EAR/PLS OXIMETRY 1: CPT

## 2023-10-08 PROCEDURE — 71045 X-RAY EXAM CHEST 1 VIEW: CPT

## 2023-10-08 PROCEDURE — 99291 CRITICAL CARE FIRST HOUR: CPT | Performed by: INTERNAL MEDICINE

## 2023-10-08 PROCEDURE — 94150 VITAL CAPACITY TEST: CPT

## 2023-10-08 PROCEDURE — 94003 VENT MGMT INPAT SUBQ DAY: CPT

## 2023-10-08 PROCEDURE — 82330 ASSAY OF CALCIUM: CPT

## 2023-10-08 PROCEDURE — 80053 COMPREHEN METABOLIC PANEL: CPT

## 2023-10-08 PROCEDURE — 80048 BASIC METABOLIC PNL TOTAL CA: CPT | Performed by: PHARMACIST

## 2023-10-08 PROCEDURE — 85027 COMPLETE CBC AUTOMATED: CPT

## 2023-10-08 PROCEDURE — 83735 ASSAY OF MAGNESIUM: CPT

## 2023-10-08 PROCEDURE — 94640 AIRWAY INHALATION TREATMENT: CPT

## 2023-10-08 RX ORDER — NICOTINE 21 MG/24HR
14 PATCH, TRANSDERMAL 24 HOURS TRANSDERMAL DAILY
Status: COMPLETED | OUTPATIENT
Start: 2023-10-09 | End: 2023-10-16

## 2023-10-08 RX ORDER — NICOTINE 21 MG/24HR
14 PATCH, TRANSDERMAL 24 HOURS TRANSDERMAL DAILY
Status: DISCONTINUED | OUTPATIENT
Start: 2023-10-09 | End: 2023-10-08

## 2023-10-08 RX ORDER — FUROSEMIDE 10 MG/ML
40 INJECTION INTRAMUSCULAR; INTRAVENOUS ONCE
Status: COMPLETED | OUTPATIENT
Start: 2023-10-08 | End: 2023-10-08

## 2023-10-08 RX ADMIN — IPRATROPIUM BROMIDE 0.5 MG: 0.5 SOLUTION RESPIRATORY (INHALATION) at 07:20

## 2023-10-08 RX ADMIN — LEVALBUTEROL HYDROCHLORIDE 1.25 MG: 1.25 SOLUTION RESPIRATORY (INHALATION) at 19:13

## 2023-10-08 RX ADMIN — NICOTINE 21 MG: 21 PATCH, EXTENDED RELEASE TRANSDERMAL at 10:00

## 2023-10-08 RX ADMIN — GUAIFENESIN 200 MG: 200 SOLUTION ORAL at 22:00

## 2023-10-08 RX ADMIN — FUROSEMIDE 40 MG: 10 INJECTION, SOLUTION INTRAMUSCULAR; INTRAVENOUS at 06:15

## 2023-10-08 RX ADMIN — GUAIFENESIN 200 MG: 200 SOLUTION ORAL at 09:00

## 2023-10-08 RX ADMIN — ENOXAPARIN SODIUM 30 MG: 30 INJECTION SUBCUTANEOUS at 10:00

## 2023-10-08 RX ADMIN — SODIUM CHLORIDE SOLN NEBU 3% 4 ML: 3 NEBU SOLN at 13:50

## 2023-10-08 RX ADMIN — SODIUM CHLORIDE SOLN NEBU 3% 4 ML: 3 NEBU SOLN at 19:13

## 2023-10-08 RX ADMIN — SODIUM CHLORIDE SOLN NEBU 3% 4 ML: 3 NEBU SOLN at 02:19

## 2023-10-08 RX ADMIN — OXYCODONE HYDROCHLORIDE 5 MG: 5 SOLUTION ORAL at 06:15

## 2023-10-08 RX ADMIN — SERTRALINE 75 MG: 25 TABLET, FILM COATED ORAL at 10:00

## 2023-10-08 RX ADMIN — BUSPIRONE HYDROCHLORIDE 30 MG: 10 TABLET ORAL at 10:00

## 2023-10-08 RX ADMIN — CHLORHEXIDINE GLUCONATE 15 ML: 1.2 SOLUTION ORAL at 22:00

## 2023-10-08 RX ADMIN — OXYCODONE HYDROCHLORIDE 5 MG: 5 SOLUTION ORAL at 21:59

## 2023-10-08 RX ADMIN — OXYCODONE HYDROCHLORIDE 5 MG: 5 SOLUTION ORAL at 15:01

## 2023-10-08 RX ADMIN — FAMOTIDINE 20 MG: 20 TABLET, FILM COATED ORAL at 10:00

## 2023-10-08 RX ADMIN — QUETIAPINE FUMARATE 50 MG: 25 TABLET ORAL at 22:00

## 2023-10-08 RX ADMIN — GABAPENTIN 300 MG: 300 CAPSULE ORAL at 15:01

## 2023-10-08 RX ADMIN — LEVALBUTEROL HYDROCHLORIDE 1.25 MG: 1.25 SOLUTION RESPIRATORY (INHALATION) at 13:50

## 2023-10-08 RX ADMIN — CHLORHEXIDINE GLUCONATE 15 ML: 1.2 SOLUTION ORAL at 10:00

## 2023-10-08 RX ADMIN — Medication 8.8 MG: at 22:00

## 2023-10-08 RX ADMIN — LEVALBUTEROL HYDROCHLORIDE 1.25 MG: 1.25 SOLUTION RESPIRATORY (INHALATION) at 07:19

## 2023-10-08 RX ADMIN — FAMOTIDINE 20 MG: 20 TABLET, FILM COATED ORAL at 17:22

## 2023-10-08 RX ADMIN — BUSPIRONE HYDROCHLORIDE 30 MG: 10 TABLET ORAL at 17:00

## 2023-10-08 RX ADMIN — IPRATROPIUM BROMIDE 0.5 MG: 0.5 SOLUTION RESPIRATORY (INHALATION) at 13:50

## 2023-10-08 RX ADMIN — ACETAMINOPHEN 650 MG: 325 TABLET, FILM COATED ORAL at 10:15

## 2023-10-08 RX ADMIN — GABAPENTIN 300 MG: 300 CAPSULE ORAL at 22:00

## 2023-10-08 RX ADMIN — LEVALBUTEROL HYDROCHLORIDE 1.25 MG: 1.25 SOLUTION RESPIRATORY (INHALATION) at 02:18

## 2023-10-08 RX ADMIN — BUSPIRONE HYDROCHLORIDE 30 MG: 10 TABLET ORAL at 22:00

## 2023-10-08 RX ADMIN — IPRATROPIUM BROMIDE 0.5 MG: 0.5 SOLUTION RESPIRATORY (INHALATION) at 19:13

## 2023-10-08 RX ADMIN — GABAPENTIN 300 MG: 300 CAPSULE ORAL at 10:00

## 2023-10-08 RX ADMIN — SODIUM CHLORIDE SOLN NEBU 3% 4 ML: 3 NEBU SOLN at 07:20

## 2023-10-08 RX ADMIN — GUAIFENESIN 200 MG: 200 SOLUTION ORAL at 15:01

## 2023-10-08 RX ADMIN — IPRATROPIUM BROMIDE 0.5 MG: 0.5 SOLUTION RESPIRATORY (INHALATION) at 02:18

## 2023-10-08 NOTE — ASSESSMENT & PLAN NOTE
Lab Results   Component Value Date    CHOLESTEROL 203 (H) 01/24/2023    CHOLESTEROL 177 08/11/2022    CHOLESTEROL 184 07/08/2020     Lab Results   Component Value Date    HDL 45 (L) 01/24/2023    HDL 37 (L) 08/11/2022    HDL 44 (L) 07/08/2020     Lab Results   Component Value Date    TRIG 166 (H) 09/16/2023    TRIG 160 (H) 01/24/2023    TRIG 108 08/11/2022     Lab Results   Component Value Date    NONHDLC 146 07/12/2018    [address] [facility] 207 (H) 04/29/2013     Continue outpatient diet and lifestyle modification.

## 2023-10-08 NOTE — ASSESSMENT & PLAN NOTE
Wt Readings from Last 3 Encounters:   10/08/23 83.6 kg (184 lb 4.9 oz)   09/07/23 74.6 kg (164 lb 6.4 oz)   08/30/23 73.3 kg (161 lb 9.6 oz)     Lab Results   Component Value Date    LVEF 60 08/28/2023     (H) 09/29/2023     (H) 09/13/2023     Echo 8/28/23: LVEF 60%. CXR 9/22: Persistent pulmonary venous congestion with small effusions and bibasilar atelectasis. Patient has not responded well to Lasix 40 or Bumex 2. Baseline creatinine around 0.8, yesterday 2.07 but today 1.9 so likely plateauing PO.      Received Lasix 40 x1 with repeat BMP Cr 1.5 and K 5.2     Plan:  • BMP, magnesium; Goal Mg > 2 and K > 4; Replete prn  • Measure I/O

## 2023-10-08 NOTE — ASSESSMENT & PLAN NOTE
· 9/13 CT PE study: Patchy consolidation right middle lobe, new from 8/25/2023, compatible with pneumonia. · No leukocytosis, no fever/chills. · Positive for sore throat, cough. · New onset pneumonia after recent hospitalization and antibiotics treatment. · 9/14: Bronchoscopy/ ABL. · MRSA negative. · ABL revealed 2+ Growth of Candida albicans, suspected contaminant.

## 2023-10-08 NOTE — ASSESSMENT & PLAN NOTE
Cuffless trach placed 9/17, transitioned to cuffed on 10/3. Oxygen requirements not improving. For mental health patient is on buspirone, quetiapine, and sertraline. For pain, gabapentin, oxycodone scheduled and prn, and hydromorphone prn. On Guaifenesin, Atrovent and Xopenex for airway maintenance. No improvement in bilateral consolidation or atelectasis and groundglass opacities on repeat CT and chest x-rays. No change since bronchoscopy yesterday. 14/10/80% satting 94%. Patient was not receiving proper positive pressure to open up atelectatic lung. On PS 14/10/70 this morning. 14/10/80 overnight. Saturated well overnight. Received Lasix 40 x1 at 0230 this morning. Patient states she wants Slater removed. Net negative 2 L.  Less crackles on exam  Bronchial cx with 3 colonies CoNS     Plan:  • Attempt to wean O2 requirements-maintain cuff overnight from 9 pm to 6 am for positive pressure, can open cuff during the day for patient to speak  • PCP PCR, AFB culture, viral culture, fungal culture from bronchoscopy all pending  • Remove Slater

## 2023-10-08 NOTE — ASSESSMENT & PLAN NOTE
Lab Results   Component Value Date    EGFR 29 10/08/2023    EGFR 31 10/08/2023    EGFR 26 10/07/2023    CREATININE 1.72 (H) 10/08/2023    CREATININE 1.64 (H) 10/08/2023    CREATININE 1.90 (H) 10/07/2023     Baseline Cr appears to be between 0.75-1.1. Patient received 2 doses of Bumex IV 2 g yesterday as she had not been responding appropriately to IV 40 Lasix daily. Creatinine went up by 0.5 meeting criteria for an PO. This may be prerenal intrinsic or postrenal.  Potential prerenal causes include reduced kidney perfusion due to lisinopril. Intrinsic can also be lisinopril due to ATN, AIN from chemo medications, TLS, crystaline nephropathy from acyclovir, rituxan nephrotoxicity, filgrastim glomerulonephritis. Post renal obstructive could be from urine retention from vincristine, cyclophosphamide bladder fibrosis. Suspect most likely due to medications as a combination of prerenal and intrinsic. Cr up to 2 today. FENa was 0.2%, UA shows no casts or signs of infection, urine eosinophils 0%. Patient likely has prerenal PO from volume depletion. Received 2 L NS and 1 pRBC and cr went up by 0.07 still and Na and Cl went down, suspect that volume prevented further cr rise and free water excretion impaired by kidney function, allowing hyponatremia to increase. Creatinine increase is likely plateaued. Went down today. Patient did receive 20 of Lasix overnight for volume overload which she responded well to.   Suspect that now that nephrotoxic medications have been stopped she will continue to respond well to Lasix and creatinine will continue to downtrend    Repeat BMP Cr 1.5, K 5.2 (post Lasix 40 x1)    Plan  · Continue to monitor

## 2023-10-08 NOTE — ASSESSMENT & PLAN NOTE
Lab Results   Component Value Date    CREATININE 1.72 (H) 10/08/2023    CREATININE 1.64 (H) 10/08/2023    CREATININE 1.90 (H) 10/07/2023       Likely prerenal.  Second to poor oral intake versus poor urine output. Baseline creatinine 1 to 1.2.   Cr trending down today 1.64  Slater catheter placed overnight 10/7    Plan:  • Urinary retention protocol, Daily weights, I/O  • Trend Cr daily

## 2023-10-08 NOTE — ASSESSMENT & PLAN NOTE
POA with acute respiratory failure, SOB. On physical in [place] ([place] ED: Swollen tongue, swelling soft and hard palate and almost the head of all phalanx is completely closed. Severe angioedema. Decadron 10 mg, Benadryl 25 mg given. Initially refused but eventually agreed with intubation. 2/2 oropharyngeal mass compression.     Plan:  • Cuffless trach placed 9/17, transitioned to cuffed 10/3  • Tolerating trach without issue  • See acute respiratory failure with hypoxia secondary to oropharyngeal mass above

## 2023-10-08 NOTE — ASSESSMENT & PLAN NOTE
Biopsy on 9/17: Large B-cell lymphoma, stage II. First inpatient chemotherapy 9/22/2023- with R-EPOCH. 9/27 Rituxan. 9/28 Filgrastim. acyclovir, Zyloprim, Diflucan, Levaquin, Zofran, Bactrim for chemo prophylaxis, all discontinued as Nafisa reached 4200 yesterday with heme-onc's approval. Todays CBC WBC up from 6.34 to 12.26. Low-grade fever of 100 yesterday about an hour and a half after bronchoscopy. I suspect this fever was just reactive and that the leukocytosis is just secondary to the filgrastim which was just stopped yesterday.     Plan:  · Inpatient and outpatient hematology oncology following, next inpatient [facility] cycle starts 10/10  · Repeat BCx if patient spikes another fever

## 2023-10-08 NOTE — ASSESSMENT & PLAN NOTE
Lab Results   Component Value Date    CREATININE 1.64 (H) 10/08/2023    CREATININE 1.90 (H) 10/07/2023    CREATININE 2.07 (H) 10/06/2023       Likely prerenal.  Second to poor oral intake versus poor urine output. Baseline creatinine 1 to 1.2. Cr trending down today 1.64  Slater catheter placed overnight 10/7    Plan:  • Currently resolved  • Urinary retention protocol, Daily weights, I/O  • Monitor BMP daily and observe for downward trend of creatinine  • Avoid hypoperfusion of the kidneys, minimize nephrotoxins  • RAAS Blockers/Diuretics held: Lisinopril.

## 2023-10-08 NOTE — PROGRESS NOTES
[address] [facility]  Progress Note: Critical Care  Name: Kemar Chávez 69 y.o. female I MRN: [id number]  Unit/Bed#: ICU 03 I Date of Admission: 9/13/2023   Date of Service: 10/8/2023 I Hospital Day: 25    Assessment/Plan   Depression  Assessment & Plan  Patient on Zoloft 75 daily and Seroquel evening. Patient is depressed, met with palliative 10/5 for goals of care. Patient was firm that she wants to live and to fight, she is just tired. Generalized abdominal pain  Assessment & Plan  Patient reports abdominal pain which is unchanged since 9/22 no guarding on exam. Takes Famotidine for GERD at home. Alk phos 10/2 145, today 299.     - Continue Famotidine  - On bowel regimen with Senna and Miralax prn    Chronic Superficial thrombophlebitis  Assessment & Plan  Right forearm soreness. BUE ultrasound in setting of high risk of DVT. RUE US[de-identified] No evidence of acute or chronic deep vein thrombosis. 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 Lovenox 40 no longer appropriate with PO. As PO is trending down we can consider going back up to Lovenox 40, will reevaluate after creatinine drops below 1.5    -Continue DVT ppx with Lovenox 30 renal dosing    Large cell lymphoma ([address])  Assessment & Plan  Biopsy on 9/17: Large B-cell lymphoma, stage II. First inpatient chemotherapy 9/22/2023- with R-EPOCH. 9/27 Rituxan. 9/28 Filgrastim. acyclovir, Zyloprim, Diflucan, Levaquin, Zofran, Bactrim for chemo prophylaxis, all discontinued as Nafisa reached 4200 yesterday with heme-onc's approval. Todays CBC WBC up from 6.34 to 12.26. Low-grade fever of 100 yesterday about an hour and a half after bronchoscopy. I suspect this fever was just reactive and that the leukocytosis is just secondary to the filgrastim which was just stopped yesterday.     Plan:  · Inpatient and outpatient hematology oncology following, next inpatient [facility] cycle starts 10/10  · Monitor ANC on CBC   · Repeat BCx if patient spikes another fever    Blister (nonthermal) of left forearm, sequela  Assessment & Plan  Blisters of LUE healing well, no signs of infection. Continue daily wound care    Oropharyngeal mass  Assessment & Plan  9/14 Neck/ soft tissue CT: Large enhancing soft tissue mass identified within the left neck involving multiple spaces. Centered within the left oropharynx with extensions as described above into multiple spaces. This results in significant airway compromise. 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 Small level 3 and level 4 nodes are seen within the neck. Tracheostomy by ENT, bx & addition testing performed. Replaced on 9/26. H/o tobacco abuse, daily smoker. On nicotine while inpatient, confirmed with patient she needs nicotine patch    Next R-EPOCH scheduled 10/10 (Cycle 2)    Plan:  · ENT and heme onc following  · See acute respiratory failure and large B cell lymphoma above      Angioedema  Assessment & Plan  POA with acute respiratory failure, SOB. On physical in [place] ([place] ED: Swollen tongue, swelling soft and hard palate and almost the head of all phalanx is completely closed. Severe angioedema. Decadron 10 mg, Benadryl 25 mg given. Initially refused but eventually agreed with intubation. 2/2 oropharyngeal mass compression. Plan:  • Cuffless trach placed 9/17, transitioned to cuffed 10/3  • Tolerating trach without issue  • See acute respiratory failure with hypoxia secondary to oropharyngeal mass above    Ambulatory dysfunction  Assessment & Plan  Impacted by chronic pain syndrome, but medication regimen may also contribute. Patient currently requires tracheostomy with ICU level care. Will require PT/ OT eval before discharge.     (HFpEF) heart failure with preserved ejection fraction [facility])  Assessment & Plan  Wt Readings from Last 3 Encounters:   10/07/23 81.6 kg (179 lb 14.3 oz)   09/07/23 74.6 kg (164 lb 6.4 oz)   08/30/23 73.3 kg (161 lb 9.6 oz)     Lab Results   Component Value Date    LVEF 60 08/28/2023     (H) 09/29/2023     (H) 09/13/2023     Echo 8/28/23: LVEF 60%. CXR 9/22: Persistent pulmonary venous congestion with small effusions and bibasilar atelectasis. Patient has not responded well to Lasix 40 or Bumex 2. Baseline creatinine around 0.8, yesterday 2.07 but today 1.9 so likely plateauing PO. Patient received 20 of Lasix overnight responded well. Refused additional lasix today, volume overloaded.     Plan:  • BMP, magnesium; Goal Mg > 2 and K > 4; Replete prn  • Measure I/O        CKD (chronic kidney disease) stage 3, GFR 30-59 ml/min [facility])  Assessment & Plan  Lab Results   Component Value Date    EGFR 26 10/07/2023    EGFR 23 10/06/2023    EGFR 24 10/05/2023    CREATININE 1.90 (H) 10/07/2023    CREATININE 2.07 (H) 10/06/2023    CREATININE 2.00 (H) 10/05/2023     Baseline Cr appears to be between 0.75-1.1. Patient received 2 doses of Bumex IV 2 g yesterday as she had not been responding appropriately to IV 40 Lasix daily. Creatinine went up by 0.5 meeting criteria for an PO. This may be prerenal intrinsic or postrenal.  Potential prerenal causes include reduced kidney perfusion due to lisinopril. Intrinsic can also be lisinopril due to ATN, AIN from chemo medications, TLS, crystaline nephropathy from acyclovir, rituxan nephrotoxicity, filgrastim glomerulonephritis. Post renal obstructive could be from urine retention from vincristine, cyclophosphamide bladder fibrosis. Suspect most likely due to medications as a combination of prerenal and intrinsic. Cr up to 2 today. FENa was 0.2%, UA shows no casts or signs of infection, urine eosinophils 0%. Patient likely has prerenal PO from volume depletion. Received 2 L NS and 1 pRBC and cr went up by 0.07 still and Na and Cl went down, suspect that volume prevented further cr rise and free water excretion impaired by kidney function, allowing hyponatremia to increase. Creatinine increase is likely plateaued. Went down today. Patient did receive 20 of Lasix overnight for volume overload which she responded well to. Suspect that now that nephrotoxic medications have been stopped she will continue to respond well to Lasix and creatinine will continue to downtrend    - Lasix 40 IV twice daily if patient agrees with goal -500 mL  Cr continues to trend down today 1.64. K elevated at 5.3    Plan  · Received Lasix 40 this morning with goal of -500 by noon  · Monitor BMP at noon, post Lasix dose    Pain syndrome, chronic  Assessment & Plan  Home regimen: Oxycodone 5 mg twice daily prn. Tylenol 650 mg every 8 hours prn. Gabapentin 600 mg 3 times daily. Medical marijuana. Lumbar radiculopathy. Impaired ambulatory dysfunction second to pain. Patient is taking opioids regularly for pain, has bowel regimen and is having bowel movements daily. Patient has more pain today secondary to bronchoscopy yesterday, specifically in her throat and chest.    LLQ abdominal pain    Plan:  · Continue gabapentin 300 mg 3 times daily, oxycodone 5 mg 3 times daily, prn Tylenol 650 mg every 6 hours. Benign essential hypertension  Assessment & Plan  Takes Coreg 12.5 mg BID, Amlodipine 10 mg daily, and lisinopril 40 mg daily all discontinued at this time secondary to hypotension and PO. If blood pressure is elevated, restart Coreg only    Stable currently    Plan:  · Monitor vitals    * Acute respiratory failure with hypoxia secondary to oropharyngeal mass  Assessment & Plan  Cuffless trach placed 9/17, transitioned to cuffed on 10/3. Oxygen requirements not improving. For mental health patient is on buspirone, quetiapine, and sertraline. For pain, gabapentin, oxycodone scheduled and prn, and hydromorphone prn. On Guaifenesin, Atrovent and Xopenex for airway maintenance. No improvement in bilateral consolidation or atelectasis and groundglass opacities on repeat CT and chest x-rays. No change since bronchoscopy yesterday. 14/10/80% satting 94%.   Patient was not receiving proper positive pressure to open up atelectatic lung. On PS 14/10/70 this morning. 14/10/80 overnight. Required Lasix 40 at midnight due to O2sat 83, and pink frothy suction. Overnight about net even. Goal -500 mL given another Lasix 40 this AM  Patient agreeable to Slater catheter overnight 10/7  Preliminary afb stain no acid fast bacilli, bronch stain rare epis, no polys or bacteria     Plan:  • Attempt to wean O2 requirements-maintain cuff overnight from 9 pm to 6 am for positive pressure, can open cuff during the day for patient to speak  • PCP PCR, AFB culture, bronc culture, viral culture, fungal culture from bronchoscopy all pending    COPD without exacerbation (HCC)-resolved as of 9/26/2023  Assessment & Plan  Continue vent with nebs. Wean off vent. Encephalopathy-resolved as of 9/21/2023  Assessment & Plan  9/19- Pt appeared to be OAA x3. Improving. PO (acute kidney injury) (HCC)-resolved as of 9/21/2023  Assessment & Plan  Lab Results   Component Value Date    CREATININE 1.64 (H) 10/08/2023    CREATININE 1.90 (H) 10/07/2023    CREATININE 2.07 (H) 10/06/2023       Likely prerenal.  Second to poor oral intake versus poor urine output. Baseline creatinine 1 to 1.2. Cr trending down today 1.64  Slater catheter placed overnight 10/7    Plan:  • Currently resolved  • Urinary retention protocol, Daily weights, I/O  • Monitor BMP daily and observe for downward trend of creatinine  • Avoid hypoperfusion of the kidneys, minimize nephrotoxins  • RAAS Blockers/Diuretics held: Lisinopril. RML pneumonia-resolved as of 9/22/2023  Assessment & Plan  · 9/13 CT PE study: Patchy consolidation right middle lobe, new from 8/25/2023, compatible with pneumonia. · No leukocytosis, no fever/chills. · Positive for sore throat, cough. · New onset pneumonia after recent hospitalization and antibiotics treatment. · 9/14: Bronchoscopy/ ABL. · MRSA negative.    · ABL revealed 2+ Growth of Candida albicans, suspected contaminant. Hyperlipidemia-resolved as of 10/2/2023  Assessment & Plan  Lab Results   Component Value Date    CHOLESTEROL 203 (H) 01/24/2023    CHOLESTEROL 177 08/11/2022    CHOLESTEROL 184 07/08/2020     Lab Results   Component Value Date    HDL 45 (L) 01/24/2023    HDL 37 (L) 08/11/2022    HDL 44 (L) 07/08/2020     Lab Results   Component Value Date    TRIG 166 (H) 09/16/2023    TRIG 160 (H) 01/24/2023    TRIG 108 08/11/2022     Lab Results   Component Value Date    NONHDLC 146 07/12/2018    [address] (H) 04/29/2013     Continue outpatient diet and lifestyle modification. Disposition: Stepdown Level 1    ICU Core Measures     A: Assess, Prevent, and Manage Pain · Has pain been assessed? Yes  · Need for changes to pain regimen? No   B: Both SAT/SAT  · N/A   C: Choice of Sedation · RASS Goal: 0 Alert and Calm  · Need for changes to sedation or analgesia regimen? No   D: Delirium · CAM-ICU: Negative   E: Early Mobility  · Plan for early mobility? Yes   F: Family Engagement · Plan for family engagement today? Yes         Prophylaxis:  VTE VTE covered by:  enoxaparin, Subcutaneous, 30 mg at 10/07/23 0900       Stress Ulcer  not ordered          Subjective   HPI/24hr events: Patient appears in overall good mood today. Has some LLQ pain where she receives injections. Had BM this morning. Slater catheter in place. Denies HA, fever, chills, CP, N/V/D. Offers no additional complaints.   Review of Systems      Objective                            Vitals I/O      Most Recent Min/Max in 24hrs   Temp 99.9 °F (37.7 °C) Temp  Min: 98.2 °F (36.8 °C)  Max: 100 °F (37.8 °C)   Pulse 98 Pulse  Min: 83  Max: 98   Resp 16 Resp  Min: 12  Max: 29   /62 BP  Min: 104/55  Max: 110/62   O2 Sat 97 % SpO2  Min: 90 %  Max: 100 %      Intake/Output Summary (Last 24 hours) at 10/8/2023 0756  Last data filed at 10/8/2023 0526  Gross per 24 hour   Intake 2047 ml   Output 2095 ml   Net -48 ml         Diet Enteral/Parenteral; Tube Feeding No Oral Diet; Jevity 1.5; Continuous; 50; Every 4 hours     Invasive Monitoring Physical exam    Physical Exam  Constitutional:       General: She is not in acute distress. Appearance: Normal appearance. She is ill-appearing (chronically). She is not toxic-appearing. HENT:      Head: Normocephalic and atraumatic. Nose: Nose normal. No congestion. Mouth/Throat:      Mouth: Mucous membranes are moist.      Pharynx: No oropharyngeal exudate. Eyes:      General: No scleral icterus. Conjunctiva/sclera: Conjunctivae normal.   Cardiovascular:      Rate and Rhythm: Normal rate and regular rhythm. Pulses: Normal pulses. Heart sounds: Normal heart sounds. No murmur heard. Pulmonary:      Effort: Pulmonary effort is normal.      Breath sounds: Wheezing and rales present. Abdominal:      General: Bowel sounds are normal. There is no distension. Palpations: Abdomen is soft. Tenderness: There is abdominal tenderness (LLQ tenderness to palpation. No obvious skin changes, some bruising from injections). Musculoskeletal:         General: No tenderness. Cervical back: Neck supple. No tenderness. Right lower leg: No edema. Left lower leg: No edema. Lymphadenopathy:      Cervical: No cervical adenopathy. Skin:     General: Skin is warm. Capillary Refill: Capillary refill takes less than 2 seconds. Coloration: Skin is not jaundiced. Neurological:      General: No focal deficit present. Mental Status: She is alert and oriented to person, place, and time. Mental status is at baseline.    Psychiatric:         Mood and Affect: Mood normal.         Behavior: Behavior normal.           Diagnostic Studies      EKG: None  Imaging: Pending CXR      Medications:  Scheduled PRN   busPIRone, 30 mg, TID  chlorhexidine, 15 mL, Q12H ASHLEY  enoxaparin, 30 mg, Q24H ASHLEY  famotidine, 20 mg, BID  gabapentin, 300 mg, TID  guaiFENesin, 200 mg, Q6H  levalbuterol, 1.25 mg, Q6H   And  ipratropium, 0.5 mg, Q6H  nicotine, 21 mg, Daily  oxyCODONE, 5 mg, Q8H  QUEtiapine, 50 mg, HS  senna, 8.8 mg, HS  sertraline, 75 mg, Daily  sodium chloride, 4 mL, Q6H      acetaminophen, 650 mg, Q6H PRN  HYDROmorphone, 0.2 mg, Q6H PRN  levalbuterol, 1.25 mg, Q8H PRN  ondansetron, 4 mg, Q8H PRN  oxyCODONE, 2.5 mg, Q4H PRN   Or  oxyCODONE, 5 mg, Q4H PRN  polyethylene glycol, 17 g, Daily PRN       Continuous          Labs:    CBC    Recent Labs     10/07/23  0444 10/08/23  0523   WBC 12.26* 13.06*   HGB 8.1* 8.3*   HCT 26.0* 26.4*   * 155   BANDSPCT 16*  --      BMP    Recent Labs     10/07/23  0444 10/08/23  0523   SODIUM 132* 135   K 5.1 5.3   CL 93* 94*   CO2 34* 37*   AGAP 5 4   BUN 52* 51*   CREATININE 1.90* 1.64*   CALCIUM 8.9 9.2       Coags    No recent results     Additional Electrolytes  Recent Labs     10/07/23  0444 10/08/23  0523   MG 2.4 2.3   PHOS 3.2 3.7   CAIONIZED  --  1.20          Blood Gas    No recent results  No recent results LFTs  Recent Labs     10/07/23  0444 10/08/23  0523   ALT 34 31   AST 32 25   ALKPHOS 299* 275*   ALB 2.5* 2.5*   TBILI 0.50 0.42       Infectious  No recent results  Glucose  Recent Labs     10/07/23  0444 10/08/23  0523   GLUC 117 131                 Christina Lima MD

## 2023-10-08 NOTE — QUICK NOTE
Spoke with Drew Akers on the phone. Provided updates regarding CXR, Slater catheter placement and plan for continued diuretics in light of improving creatinine. unknown

## 2023-10-09 LAB
ALBUMIN SERPL BCP-MCNC: 2.6 G/DL (ref 3.5–5)
ALP SERPL-CCNC: 235 U/L (ref 34–104)
ALT SERPL W P-5'-P-CCNC: 26 U/L (ref 7–52)
ANION GAP SERPL CALCULATED.3IONS-SCNC: 5 MMOL/L
ANISOCYTOSIS BLD QL SMEAR: PRESENT
AST SERPL W P-5'-P-CCNC: 21 U/L (ref 13–39)
BACTERIA BRONCH AEROBE CULT: ABNORMAL
BASOPHILS # BLD MANUAL: 0 THOUSAND/UL (ref 0–0.1)
BASOPHILS NFR MAR MANUAL: 0 % (ref 0–1)
BILIRUB SERPL-MCNC: 0.36 MG/DL (ref 0.2–1)
BUN SERPL-MCNC: 50 MG/DL (ref 5–25)
CALCIUM ALBUM COR SERPL-MCNC: 10.8 MG/DL (ref 8.3–10.1)
CALCIUM SERPL-MCNC: 9.7 MG/DL (ref 8.4–10.2)
CHLORIDE SERPL-SCNC: 95 MMOL/L (ref 96–108)
CHOLEST SERPL-MCNC: 118 MG/DL
CO2 SERPL-SCNC: 39 MMOL/L (ref 21–32)
CREAT SERPL-MCNC: 1.5 MG/DL (ref 0.6–1.3)
EOSINOPHIL # BLD MANUAL: 0 THOUSAND/UL (ref 0–0.4)
EOSINOPHIL NFR BLD MANUAL: 0 % (ref 0–6)
ERYTHROCYTE [DISTWIDTH] IN BLOOD BY AUTOMATED COUNT: 17 % (ref 11.6–15.1)
FUNGUS SPEC CULT: NORMAL
GFR SERPL CREATININE-BSD FRML MDRD: 35 ML/MIN/1.73SQ M
GLUCOSE SERPL-MCNC: 140 MG/DL (ref 65–140)
GRAM STN SPEC: ABNORMAL
GRAM STN SPEC: ABNORMAL
HCT VFR BLD AUTO: 26.8 % (ref 34.8–46.1)
HDLC SERPL-MCNC: 14 MG/DL
HGB BLD-MCNC: 8.3 G/DL (ref 11.5–15.4)
HYPERCHROMIA BLD QL SMEAR: PRESENT
LDLC SERPL CALC-MCNC: 75 MG/DL (ref 0–100)
LYMPHOCYTES # BLD AUTO: 1.5 THOUSAND/UL (ref 0.6–4.47)
LYMPHOCYTES # BLD AUTO: 9 % (ref 14–44)
MAGNESIUM SERPL-MCNC: 2.1 MG/DL (ref 1.9–2.7)
MCH RBC QN AUTO: 30.5 PG (ref 26.8–34.3)
MCHC RBC AUTO-ENTMCNC: 31 G/DL (ref 31.4–37.4)
MCV RBC AUTO: 99 FL (ref 82–98)
METAMYELOCYTES NFR BLD MANUAL: 7 % (ref 0–1)
MONOCYTES # BLD AUTO: 1.5 THOUSAND/UL (ref 0–1.22)
MONOCYTES NFR BLD: 10 % (ref 4–12)
MYCOBACTERIUM SPEC CULT: NORMAL
MYELOCYTES NFR BLD MANUAL: 9 % (ref 0–1)
NEUTROPHILS # BLD MANUAL: 9.44 THOUSAND/UL (ref 1.85–7.62)
NEUTS BAND NFR BLD MANUAL: 13 % (ref 0–8)
NEUTS SEG NFR BLD AUTO: 50 % (ref 43–75)
NONHDLC SERPL-MCNC: 104 MG/DL
PHOSPHATE SERPL-MCNC: 4.3 MG/DL (ref 2.3–4.1)
PLATELET # BLD AUTO: 167 THOUSANDS/UL (ref 149–390)
PLATELET BLD QL SMEAR: ADEQUATE
PMV BLD AUTO: 10.2 FL (ref 8.9–12.7)
POIKILOCYTOSIS BLD QL SMEAR: PRESENT
POTASSIUM SERPL-SCNC: 5.2 MMOL/L (ref 3.5–5.3)
PROCALCITONIN SERPL-MCNC: 0.36 NG/ML
PROMYELOCYTES NFR BLD MANUAL: 1 % (ref 0–0)
PROT SERPL-MCNC: 5.6 G/DL (ref 6.4–8.4)
RBC # BLD AUTO: 2.72 MILLION/UL (ref 3.81–5.12)
RBC MORPH BLD: PRESENT
RHODAMINE-AURAMINE STN SPEC: NORMAL
SODIUM SERPL-SCNC: 139 MMOL/L (ref 135–147)
TOXIC GRANULES BLD QL SMEAR: PRESENT
TRIGL SERPL-MCNC: 144 MG/DL
VARIANT LYMPHS # BLD AUTO: 1 %
WBC # BLD AUTO: 14.99 THOUSAND/UL (ref 4.31–10.16)

## 2023-10-09 PROCEDURE — 94640 AIRWAY INHALATION TREATMENT: CPT

## 2023-10-09 PROCEDURE — 94003 VENT MGMT INPAT SUBQ DAY: CPT

## 2023-10-09 PROCEDURE — 80053 COMPREHEN METABOLIC PANEL: CPT | Performed by: PHARMACIST

## 2023-10-09 PROCEDURE — 94669 MECHANICAL CHEST WALL OSCILL: CPT

## 2023-10-09 PROCEDURE — 85007 BL SMEAR W/DIFF WBC COUNT: CPT | Performed by: PHARMACIST

## 2023-10-09 PROCEDURE — 83735 ASSAY OF MAGNESIUM: CPT | Performed by: PHARMACIST

## 2023-10-09 PROCEDURE — 94760 N-INVAS EAR/PLS OXIMETRY 1: CPT

## 2023-10-09 PROCEDURE — 84100 ASSAY OF PHOSPHORUS: CPT | Performed by: PHARMACIST

## 2023-10-09 PROCEDURE — 99232 SBSQ HOSP IP/OBS MODERATE 35: CPT | Performed by: STUDENT IN AN ORGANIZED HEALTH CARE EDUCATION/TRAINING PROGRAM

## 2023-10-09 PROCEDURE — 85027 COMPLETE CBC AUTOMATED: CPT | Performed by: PHARMACIST

## 2023-10-09 PROCEDURE — 94150 VITAL CAPACITY TEST: CPT

## 2023-10-09 PROCEDURE — 80061 LIPID PANEL: CPT | Performed by: PHARMACIST

## 2023-10-09 PROCEDURE — 94668 MNPJ CHEST WALL SBSQ: CPT

## 2023-10-09 PROCEDURE — 99232 SBSQ HOSP IP/OBS MODERATE 35: CPT | Performed by: INTERNAL MEDICINE

## 2023-10-09 PROCEDURE — 84145 PROCALCITONIN (PCT): CPT | Performed by: PHARMACIST

## 2023-10-09 PROCEDURE — NC001 PR NO CHARGE: Performed by: STUDENT IN AN ORGANIZED HEALTH CARE EDUCATION/TRAINING PROGRAM

## 2023-10-09 RX ORDER — FUROSEMIDE 10 MG/ML
40 INJECTION INTRAMUSCULAR; INTRAVENOUS ONCE
Status: COMPLETED | OUTPATIENT
Start: 2023-10-09 | End: 2023-10-09

## 2023-10-09 RX ADMIN — SODIUM CHLORIDE SOLN NEBU 3% 4 ML: 3 NEBU SOLN at 13:14

## 2023-10-09 RX ADMIN — LEVALBUTEROL HYDROCHLORIDE 1.25 MG: 1.25 SOLUTION RESPIRATORY (INHALATION) at 19:35

## 2023-10-09 RX ADMIN — LEVALBUTEROL HYDROCHLORIDE 1.25 MG: 1.25 SOLUTION RESPIRATORY (INHALATION) at 02:07

## 2023-10-09 RX ADMIN — CHLORHEXIDINE GLUCONATE 15 ML: 1.2 SOLUTION ORAL at 21:33

## 2023-10-09 RX ADMIN — OXYCODONE HYDROCHLORIDE 5 MG: 5 SOLUTION ORAL at 05:35

## 2023-10-09 RX ADMIN — IPRATROPIUM BROMIDE 0.5 MG: 0.5 SOLUTION RESPIRATORY (INHALATION) at 13:13

## 2023-10-09 RX ADMIN — SODIUM CHLORIDE SOLN NEBU 3% 4 ML: 3 NEBU SOLN at 07:34

## 2023-10-09 RX ADMIN — FAMOTIDINE 20 MG: 20 TABLET, FILM COATED ORAL at 17:59

## 2023-10-09 RX ADMIN — GABAPENTIN 300 MG: 300 CAPSULE ORAL at 21:32

## 2023-10-09 RX ADMIN — FUROSEMIDE 40 MG: 10 INJECTION, SOLUTION INTRAMUSCULAR; INTRAVENOUS at 11:51

## 2023-10-09 RX ADMIN — BUSPIRONE HYDROCHLORIDE 30 MG: 10 TABLET ORAL at 18:00

## 2023-10-09 RX ADMIN — SODIUM CHLORIDE SOLN NEBU 3% 4 ML: 3 NEBU SOLN at 19:35

## 2023-10-09 RX ADMIN — FAMOTIDINE 20 MG: 20 TABLET, FILM COATED ORAL at 08:26

## 2023-10-09 RX ADMIN — GUAIFENESIN 200 MG: 200 SOLUTION ORAL at 21:00

## 2023-10-09 RX ADMIN — QUETIAPINE FUMARATE 50 MG: 25 TABLET ORAL at 21:33

## 2023-10-09 RX ADMIN — ENOXAPARIN SODIUM 30 MG: 30 INJECTION SUBCUTANEOUS at 08:25

## 2023-10-09 RX ADMIN — BUSPIRONE HYDROCHLORIDE 30 MG: 10 TABLET ORAL at 08:28

## 2023-10-09 RX ADMIN — CHLORHEXIDINE GLUCONATE 15 ML: 1.2 SOLUTION ORAL at 08:25

## 2023-10-09 RX ADMIN — FUROSEMIDE 40 MG: 10 INJECTION, SOLUTION INTRAMUSCULAR; INTRAVENOUS at 02:38

## 2023-10-09 RX ADMIN — SODIUM CHLORIDE SOLN NEBU 3% 4 ML: 3 NEBU SOLN at 02:07

## 2023-10-09 RX ADMIN — LEVALBUTEROL HYDROCHLORIDE 1.25 MG: 1.25 SOLUTION RESPIRATORY (INHALATION) at 07:34

## 2023-10-09 RX ADMIN — OXYCODONE HYDROCHLORIDE 5 MG: 5 SOLUTION ORAL at 14:45

## 2023-10-09 RX ADMIN — IPRATROPIUM BROMIDE 0.5 MG: 0.5 SOLUTION RESPIRATORY (INHALATION) at 02:08

## 2023-10-09 RX ADMIN — GUAIFENESIN 200 MG: 200 SOLUTION ORAL at 14:45

## 2023-10-09 RX ADMIN — GUAIFENESIN 200 MG: 200 SOLUTION ORAL at 02:38

## 2023-10-09 RX ADMIN — IPRATROPIUM BROMIDE 0.5 MG: 0.5 SOLUTION RESPIRATORY (INHALATION) at 19:35

## 2023-10-09 RX ADMIN — GABAPENTIN 300 MG: 300 CAPSULE ORAL at 08:26

## 2023-10-09 RX ADMIN — SERTRALINE 75 MG: 25 TABLET, FILM COATED ORAL at 08:26

## 2023-10-09 RX ADMIN — NICOTINE 14 MG: 14 PATCH, EXTENDED RELEASE TRANSDERMAL at 08:25

## 2023-10-09 RX ADMIN — GABAPENTIN 300 MG: 300 CAPSULE ORAL at 17:59

## 2023-10-09 RX ADMIN — GUAIFENESIN 200 MG: 200 SOLUTION ORAL at 08:25

## 2023-10-09 RX ADMIN — BUSPIRONE HYDROCHLORIDE 30 MG: 10 TABLET ORAL at 21:30

## 2023-10-09 RX ADMIN — Medication 8.8 MG: at 21:31

## 2023-10-09 RX ADMIN — IPRATROPIUM BROMIDE 0.5 MG: 0.5 SOLUTION RESPIRATORY (INHALATION) at 07:36

## 2023-10-09 RX ADMIN — OXYCODONE HYDROCHLORIDE 5 MG: 5 SOLUTION ORAL at 21:32

## 2023-10-09 RX ADMIN — LEVALBUTEROL HYDROCHLORIDE 1.25 MG: 1.25 SOLUTION RESPIRATORY (INHALATION) at 13:13

## 2023-10-09 NOTE — QUICK NOTE
Provided medical update to Linnette over the phone.  Informed her of the next chemo start date of Friday 10/13/23

## 2023-10-09 NOTE — PROGRESS NOTES
Critical Care Attending Note; Noble Coleman   Note Date: 10/09/23  Note Time: 8:50 AM    /Patient: Paulina Lundberg  Age, : 77 y. o., 1955 MRN: [id number] Code Status: Level 1 - Full Code Patient Location: ICU 03/ICU [address] [place] [address] LOS:26 days  ]   Patient seen and examined, medical record reviewed, discussed with house staff and nursing staff. HPI   CC: BCell Lymphoma  67F with a PMH of HFpEF, COPD, CKD stage III, HTN, HLD admitted for pharengeal edema requiring intubation found to have diagnosis of B cell lymphoma.      Key Recent Events   : Admitted, Intubated  : Kelli Harm  : R-EPOCH cycle 1 day 1  : #7 Shiley XLT Cuffless  10/3: #7 Shiley XLT Cuff  10/6: Admitted MICU Hypoxia, Bronchoscopy     Main ICU Plans:       #Neuro   Chronic Pain/Bipolar  - Continue Home regimen     #Card  HTN  - Holding Norvasc, Lisinopril, Metoprolol     HFpEF -   - Diuresis - Lasix 40mg q12hr - Goal -2L    #Pulm  Tracheostomy - #7 Shiley XLT Cuff   - SBT - TCT  - Wean FiO2 - Maintain O2 Sat >90%  - Pulmonary toilet regimen - Hypertonic Saline, Chest PT   - Vap/Trach care  - ENT consulted appreciate recs  - PT/OT    COPD - Severe Emphysema  - Duonebs    #Renal  PO - Cardiorenal? - Improving with diuretics  - Lasix     #GI  Dysphagia - Aspirating  - PEG    #Heme  B - Cell Lymphoma  - Oncology Consulted - R-EPOCH - Planned Cycle 2 10/10        #DVT/GI ppx  Lovenox    #Lines/Tubes/Drains:   Peripheral IV 23 Right;Ventral (anterior) Forearm (Active)   Number of days: 4       PICC Line  Right Basilic (Active)   Number of days: 4       NG/OG/Enteral Tube Nasogastric 16 Fr Right nare (Active)   Number of days: 3       #Nutrition:   Diet Enteral/Parenteral; Tube Feeding No Oral Diet; Jevity 1.5; Continuous; 50; Every 4 hours        #Code Status:   Level 1 - Full Code    #Dispo:   Vidya Dietz MD  Pulmonary, Critical Care    Critical care time, excluding procedures, teaching, family meetings, and excludes any prior time recorded by the AP/resident, 35 minutes. Upon my evaluation, this patient has a high probability of imminent or life-threatening deterioration due to above problems which required my direct attention, intervention, and personal management. Impression/Active Problems:    B Cell lymphoma   Tracheostomy   Respiratory Failure   Secretions   HTN    TLS    Dysphagia   Chronic pain       Physical Exam:     Vital Signs:   Weight: 80.4 kg (177 lb 4 oz)  IBW: Ideal body weight: 47.8 kg (105 lb 6.1 oz)  Adjusted ideal body weight: 60.8 kg (134 lb 2 oz)  Temp:  [98.8 °F (37.1 °C)-99.7 °F (37.6 °C)] 99.7 °F (37.6 °C)  HR:  [] 109  Resp:  [14-24] 21  BP: ()/(53-64) 120/64  General: NAD  Neuro:  AxO 3  Heart: RRR  Lungs: Basilar crackles  Abdomen: Soft NT  Extremities: No edema                 Ventilator Settings:     Vent Mode: CPAP/PS Spont          Invalid input(s): "PCO2", "O2"  Radiologic Images Reviewed:   CXR  IMPRESSION:     Persistent pulmonary edema with small effusions.         Input / Output:       Intake/Output Summary (Last 24 hours) at 10/9/2023 0850  Last data filed at 10/9/2023 0600  Gross per 24 hour   Intake 900 ml   Output 2940 ml   Net -2040 ml            Infusions:     Scheduled Medications:  Current Facility-Administered Medications   Medication Dose Route Frequency Provider Last Rate   • acetaminophen  650 mg Oral Q6H PRN Mireya Redman MD     • busPIRone  30 mg Oral TID Mireya Redman MD     • chlorhexidine  15 mL Mouth/Throat Q12H [facility] Trent Spencer MD     • enoxaparin  30 mg Subcutaneous Q24H [facility] Trent Spencer MD     • famotidine  20 mg Oral BID Trent Spencer MD     • gabapentin  300 mg Oral TID Mireya Redman MD     • guaiFENesin  200 mg Oral Q6H Trent Spencer MD     • HYDROmorphone  0.2 mg Intravenous Q6H PRN Vinie Snellen, MD     • levalbuterol  1.25 mg Nebulization Q6H Trent Spencer MD      And   • ipratropium  0.5 mg Nebulization Q6H Trent Spencer MD     • levalbuterol  1.25 mg Nebulization Q8H PRN Josef Moore MD     • nicotine  14 mg Transdermal Daily Suzanne Walter MD      Followed by   • [START ON 10/16/2023] nicotine  7 mg Transdermal Daily Suzanne Walter MD     • ondansetron  4 mg Intravenous Q8H PRN Josef Moore MD     • oxyCODONE  2.5 mg Oral Q4H PRN Josef Moore MD      Or   • oxyCODONE  5 mg Oral Q4H PRN Josef Moore MD     • oxyCODONE  5 mg Oral Q8H Trent Spencer MD     • polyethylene glycol  17 g Oral Daily PRN Josef Moore MD     • QUEtiapine  50 mg Oral HS RANDEE Isaacs     • senna  8.8 mg Per NG Tube HS Trent Spencer MD     • sertraline  75 mg Per PEG Tube Daily RANDEE Neville     • sodium chloride  4 mL Nebulization Q6H Trent Spencer MD         PRN Medications:  •  acetaminophen  •  HYDROmorphone  •  levalbuterol  •  ondansetron  •  oxyCODONE **OR** oxyCODONE  •  [COMPLETED] polyethylene glycol **FOLLOWED BY** polyethylene glycol    Labs Reviewed:  Results from last 7 days   Lab Units 10/09/23  0536 10/08/23  0523 10/07/23  0444   WBC Thousand/uL 14.99* 13.06* 12.26*   HEMOGLOBIN g/dL 8.3* 8.3* 8.1*   HEMATOCRIT % 26.8* 26.4* 26.0*   PLATELETS Thousands/uL 167 155 147*      Results from last 7 days   Lab Units 10/09/23  0536 10/08/23  1248 10/08/23  0523 10/07/23  0444   SODIUM mmol/L 139 136 135 132*   CO2 mmol/L 39* 38* 37* 34*   BUN mg/dL 50* 51* 51* 52*   CALCIUM mg/dL 9.7 9.5 9.2 8.9   MAGNESIUM mg/dL 2.1  --  2.3 2.4   PHOSPHORUS mg/dL 4.3*  --  3.7 3.2         Invalid input(s): "ASTSGOT", "ALTSGPT"LABRCNTIP@ ,alkphos:3,tbilirubin:3,dbilirubin:3)@      Invalid input(s): "TROPT", "CPK", "PBNP"             I have personally seen and examined the patient on (10/09/23 between [id number]). I discussed the patient with the AP/resident including, but not limited to, verifying findings; reviewing labs and x-rays; discussing with consultants; developing the plan of care with the bedside nurse; and discussing treatment plan with patient or surrogate. I have reviewed the note and assessment performed by the AP/resident and agree with the AP/resident’s documented findings and plan of care with the above additions/exceptions. Please see my comments for details and adjustments.

## 2023-10-09 NOTE — PROGRESS NOTES
[address] [facility]  Progress Note: Critical Care  Name: Daya Harrison 77 y.o. female I MRN: [id number]  Unit/Bed#: ICU 03 I Date of Admission: 9/13/2023   Date of Service: 10/9/2023 I Hospital Day: 26    Assessment/Plan   Depression  Assessment & Plan  Patient on Zoloft 75 daily and Seroquel evening. Patient is depressed, met with palliative 10/5 for goals of care. Patient was firm that she wants to live and to fight, she is just tired. Generalized abdominal pain  Assessment & Plan  Patient reports abdominal pain which is unchanged since 9/22 no guarding on exam. Takes Famotidine for GERD at home. Alk phos 10/2 145, today 299.     - Continue Famotidine  - On bowel regimen with Senna and Miralax prn    Chronic Superficial thrombophlebitis  Assessment & Plan  Right forearm soreness. BUE ultrasound in setting of high risk of DVT. RUE US[de-identified] No evidence of acute or chronic deep vein thrombosis. 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 Lovenox 40 no longer appropriate with PO. As PO is trending down we can consider going back up to Lovenox 40, will reevaluate after creatinine drops below 1.5    -Continue DVT ppx with Lovenox 30 renal dosing    Large cell lymphoma ([address])  Assessment & Plan  Biopsy on 9/17: Large B-cell lymphoma, stage II. First inpatient chemotherapy 9/22/2023- with R-EPOCH. 9/27 Rituxan. 9/28 Filgrastim. acyclovir, Zyloprim, Diflucan, Levaquin, Zofran, Bactrim for chemo prophylaxis, all discontinued as Nafisa reached 4200 yesterday with heme-onc's approval. Todays CBC WBC up from 6.34 to 12.26. Low-grade fever of 100 yesterday about an hour and a half after bronchoscopy. I suspect this fever was just reactive and that the leukocytosis is just secondary to the filgrastim which was just stopped yesterday.     Plan:  · Inpatient and outpatient hematology oncology following, next inpatient [facility] cycle starts 10/10  · Repeat BCx if patient spikes another fever    Blister (nonthermal) of left forearm, sequela  Assessment & Plan  Blisters of LUE healing well, no signs of infection. Continue daily wound care    Oropharyngeal mass  Assessment & Plan  9/14 Neck/ soft tissue CT: Large enhancing soft tissue mass identified within the left neck involving multiple spaces. Centered within the left oropharynx with extensions as described above into multiple spaces. This results in significant airway compromise. 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 Small level 3 and level 4 nodes are seen within the neck. Tracheostomy by ENT, bx & addition testing performed. Replaced on 9/26. H/o tobacco abuse, daily smoker. On nicotine while inpatient, confirmed with patient she needs nicotine patch    Next R-EPOCH scheduled 10/10 (Cycle 2)    Plan:  · ENT and heme onc following  · See acute respiratory failure and large B cell lymphoma above      Angioedema  Assessment & Plan  POA with acute respiratory failure, SOB. On physical in [place] [facility] ED: Swollen tongue, swelling soft and hard palate and almost the head of all phalanx is completely closed. Severe angioedema. Decadron 10 mg, Benadryl 25 mg given. Initially refused but eventually agreed with intubation. 2/2 oropharyngeal mass compression. Plan:  • Cuffless trach placed 9/17, transitioned to cuffed 10/3  • Tolerating trach without issue  • See acute respiratory failure with hypoxia secondary to oropharyngeal mass above    Ambulatory dysfunction  Assessment & Plan  Impacted by chronic pain syndrome, but medication regimen may also contribute. Patient currently requires tracheostomy with ICU level care. Will require PT/ OT eval before discharge.     (HFpEF) heart failure with preserved ejection fraction [facility])  Assessment & Plan  Wt Readings from Last 3 Encounters:   10/08/23 83.6 kg (184 lb 4.9 oz)   09/07/23 74.6 kg (164 lb 6.4 oz)   08/30/23 73.3 kg (161 lb 9.6 oz)     Lab Results   Component Value Date    LVEF 60 08/28/2023  (H) 09/29/2023     (H) 09/13/2023     Echo 8/28/23: LVEF 60%. CXR 9/22: Persistent pulmonary venous congestion with small effusions and bibasilar atelectasis. Patient has not responded well to Lasix 40 or Bumex 2. Baseline creatinine around 0.8, yesterday 2.07 but today 1.9 so likely plateauing PO. Received Lasix 40 x1 with repeat BMP Cr 1.5 and K 5.2     Plan:  • BMP, magnesium; Goal Mg > 2 and K > 4; Replete prn  • Measure I/O        CKD (chronic kidney disease) stage 3, GFR 30-59 ml/min [facility])  Assessment & Plan  Lab Results   Component Value Date    EGFR 29 10/08/2023    EGFR 31 10/08/2023    EGFR 26 10/07/2023    CREATININE 1.72 (H) 10/08/2023    CREATININE 1.64 (H) 10/08/2023    CREATININE 1.90 (H) 10/07/2023     Baseline Cr appears to be between 0.75-1.1. Patient received 2 doses of Bumex IV 2 g yesterday as she had not been responding appropriately to IV 40 Lasix daily. Creatinine went up by 0.5 meeting criteria for an PO. This may be prerenal intrinsic or postrenal.  Potential prerenal causes include reduced kidney perfusion due to lisinopril. Intrinsic can also be lisinopril due to ATN, AIN from chemo medications, TLS, crystaline nephropathy from acyclovir, rituxan nephrotoxicity, filgrastim glomerulonephritis. Post renal obstructive could be from urine retention from vincristine, cyclophosphamide bladder fibrosis. Suspect most likely due to medications as a combination of prerenal and intrinsic. Cr up to 2 today. FENa was 0.2%, UA shows no casts or signs of infection, urine eosinophils 0%. Patient likely has prerenal PO from volume depletion. Received 2 L NS and 1 pRBC and cr went up by 0.07 still and Na and Cl went down, suspect that volume prevented further cr rise and free water excretion impaired by kidney function, allowing hyponatremia to increase. Creatinine increase is likely plateaued. Went down today.   Patient did receive 20 of Lasix overnight for volume overload which she responded well to. Suspect that now that nephrotoxic medications have been stopped she will continue to respond well to Lasix and creatinine will continue to downtrend    Repeat BMP Cr 1.5, K 5.2 (post Lasix 40 x1)    Plan  · Continue to monitor    Pain syndrome, chronic  Assessment & Plan  Home regimen: Oxycodone 5 mg twice daily prn. Tylenol 650 mg every 8 hours prn. Gabapentin 600 mg 3 times daily. Medical marijuana. Lumbar radiculopathy. Impaired ambulatory dysfunction second to pain. Patient is taking opioids regularly for pain, has bowel regimen and is having bowel movements daily. Patient has more pain today secondary to bronchoscopy yesterday, specifically in her throat and chest.    LLQ abdominal pain    Plan:  · Continue gabapentin 300 mg 3 times daily, oxycodone 5 mg 3 times daily, prn Tylenol 650 mg every 6 hours. Benign essential hypertension  Assessment & Plan  Takes Coreg 12.5 mg BID, Amlodipine 10 mg daily, and lisinopril 40 mg daily all discontinued at this time secondary to hypotension and PO. If blood pressure is elevated, restart Coreg only    Stable currently    Plan:  · Monitor vitals    * Acute respiratory failure with hypoxia secondary to oropharyngeal mass  Assessment & Plan  Cuffless trach placed 9/17, transitioned to cuffed on 10/3. Oxygen requirements not improving. For mental health patient is on buspirone, quetiapine, and sertraline. For pain, gabapentin, oxycodone scheduled and prn, and hydromorphone prn. On Guaifenesin, Atrovent and Xopenex for airway maintenance. No improvement in bilateral consolidation or atelectasis and groundglass opacities on repeat CT and chest x-rays. No change since bronchoscopy yesterday. 14/10/80% satting 94%. Patient was not receiving proper positive pressure to open up atelectatic lung. On PS 14/10/70 this morning. 14/10/80 overnight. Saturated well overnight. Received Lasix 40 x1 at 0230 this morning.  Patient states she wants Slater removed. Net negative 2 L. Less crackles on exam  Bronchial cx with 3 colonies CoNS     Plan:  • Attempt to wean O2 requirements-maintain cuff overnight from 9 pm to 6 am for positive pressure, can open cuff during the day for patient to speak  • PCP PCR, AFB culture, viral culture, fungal culture from bronchoscopy all pending  • Remove Slater    COPD without exacerbation (HCC)-resolved as of 9/26/2023  Assessment & Plan  Continue vent with nebs. Wean off vent. Encephalopathy-resolved as of 9/21/2023  Assessment & Plan  9/19- Pt appeared to be AAO x3. Improving. PO (acute kidney injury) (HCC)-resolved as of 9/21/2023  Assessment & Plan  Lab Results   Component Value Date    CREATININE 1.72 (H) 10/08/2023    CREATININE 1.64 (H) 10/08/2023    CREATININE 1.90 (H) 10/07/2023       Likely prerenal.  Second to poor oral intake versus poor urine output. Baseline creatinine 1 to 1.2. Cr trending down today 1.64  Slater catheter placed overnight 10/7    Plan:  • Urinary retention protocol, Daily weights, I/O  • Trend Cr daily    RML pneumonia-resolved as of 9/22/2023  Assessment & Plan  · 9/13 CT PE study: Patchy consolidation right middle lobe, new from 8/25/2023, compatible with pneumonia. · No leukocytosis, no fever/chills. · Positive for sore throat, cough. · New onset pneumonia after recent hospitalization and antibiotics treatment. · 9/14: Bronchoscopy/ ABL. · MRSA negative. · ABL revealed 2+ Growth of Candida albicans, suspected contaminant.      Hyperlipidemia-resolved as of 10/2/2023  Assessment & Plan  Lab Results   Component Value Date    CHOLESTEROL 203 (H) 01/24/2023    CHOLESTEROL 177 08/11/2022    CHOLESTEROL 184 07/08/2020     Lab Results   Component Value Date    HDL 45 (L) 01/24/2023    HDL 37 (L) 08/11/2022    HDL 44 (L) 07/08/2020     Lab Results   Component Value Date    TRIG 166 (H) 09/16/2023    TRIG 160 (H) 01/24/2023    TRIG 108 08/11/2022     Lab Results   Component Value Date    [address] 146 07/12/2018    [address] 207 (H) 04/29/2013     Continue outpatient diet and lifestyle modification. Disposition: Stepdown Level 1    ICU Core Measures      A: Assess, Prevent, and Manage Pain · Has pain been assessed? Yes  · Need for changes to pain regimen? No   B: Both SAT/SAT  · N/A   C: Choice of Sedation · RASS Goal: 0 Alert and Calm  · Need for changes to sedation or analgesia regimen? No   D: Delirium · CAM-ICU: Negative   E: Early Mobility  · Plan for early mobility? Yes   F: Family Engagement · Plan for family engagement today? Yes         Prophylaxis:  VTE VTE covered by:  enoxaparin, Subcutaneous, 30 mg at 10/08/23 1000       Stress Ulcer  not ordered          Subjective   HPI/24hr events: No acute events overnight. Patient states she feels about the same, but would like the Slater removed. Continues to have pain around the trach and PEG tube site. Feels feverish but denies cough, HA, CP, N/V/D, MSK pain. Review of Systems   Constitutional: Positive for fever. Respiratory: Positive for shortness of breath. Negative for chest tightness. Cardiovascular: Negative for chest pain and palpitations. Gastrointestinal: Positive for abdominal pain. Negative for abdominal distention. Genitourinary: Negative for difficulty urinating and dysuria. Musculoskeletal: Positive for neck pain. Negative for arthralgias. Neurological: Negative for headaches. Psychiatric/Behavioral: Negative. Negative for sleep disturbance. Agitation: depressed mood.          Objective                            Vitals I/O      Most Recent Min/Max in 24hrs   Temp 99.7 °F (37.6 °C) Temp  Min: 98.8 °F (37.1 °C)  Max: 99.7 °F (37.6 °C)   Pulse (!) 109 Pulse  Min: 85  Max: 109   Resp 21 Resp  Min: 14  Max: 24   /64 BP  Min: 95/53  Max: 131/64   O2 Sat 98 % SpO2  Min: 86 %  Max: 99 %      Intake/Output Summary (Last 24 hours) at 10/9/2023 4420  Last data filed at 10/9/2023 0600  Gross per 24 hour Intake 900 ml   Output 2940 ml   Net -2040 ml         Diet Enteral/Parenteral; Tube Feeding No Oral Diet; Jevity 1.5; Continuous; 50; Every 4 hours     Invasive Monitoring Physical exam    Physical Exam  Constitutional:       General: She is not in acute distress. Appearance: Normal appearance. She is ill-appearing (chronically). She is not toxic-appearing. HENT:      Head: Normocephalic and atraumatic. Mouth/Throat:      Mouth: Mucous membranes are moist.      Pharynx: No oropharyngeal exudate. Eyes:      General: No scleral icterus. Conjunctiva/sclera: Conjunctivae normal.   Cardiovascular:      Rate and Rhythm: Regular rhythm. Tachycardia present. Pulses: Normal pulses. Heart sounds: Normal heart sounds. No murmur heard. Pulmonary:      Effort: Pulmonary effort is normal. No respiratory distress. Breath sounds: Normal breath sounds. No wheezing, rhonchi or rales. Abdominal:      General: Bowel sounds are normal. There is no distension. Palpations: Abdomen is soft. Tenderness: There is abdominal tenderness (near PEG tube). Musculoskeletal:      Cervical back: Neck supple. No tenderness. Right lower leg: No edema. Left lower leg: No edema. Lymphadenopathy:      Cervical: No cervical adenopathy. Skin:     General: Skin is warm. Capillary Refill: Capillary refill takes less than 2 seconds. Coloration: Skin is not jaundiced. Findings: No lesion. Neurological:      General: No focal deficit present. Mental Status: She is alert and oriented to person, place, and time. Mental status is at baseline. Psychiatric:         Behavior: Behavior normal.      Comments: Depressed mood             Diagnostic Studies      EKG: No recent  Imaging: CXR reviewed I have personally reviewed pertinent reports.        Medications:  Scheduled PRN   busPIRone, 30 mg, TID  chlorhexidine, 15 mL, Q12H ASHLEY  enoxaparin, 30 mg, Q24H ASHLEY  famotidine, 20 mg, BID  gabapentin, 300 mg, TID  guaiFENesin, 200 mg, Q6H  levalbuterol, 1.25 mg, Q6H   And  ipratropium, 0.5 mg, Q6H  nicotine, 14 mg, Daily   Followed by  Vonracquelle Fady ON 10/16/2023] nicotine, 7 mg, Daily  oxyCODONE, 5 mg, Q8H  QUEtiapine, 50 mg, HS  senna, 8.8 mg, HS  sertraline, 75 mg, Daily  sodium chloride, 4 mL, Q6H      acetaminophen, 650 mg, Q6H PRN  HYDROmorphone, 0.2 mg, Q6H PRN  levalbuterol, 1.25 mg, Q8H PRN  ondansetron, 4 mg, Q8H PRN  oxyCODONE, 2.5 mg, Q4H PRN   Or  oxyCODONE, 5 mg, Q4H PRN  polyethylene glycol, 17 g, Daily PRN       Continuous          Labs:    CBC    Recent Labs     10/08/23  0523 10/09/23  0536   WBC 13.06* 14.99*   HGB 8.3* 8.3*   HCT 26.4* 26.8*    167     BMP    Recent Labs     10/08/23  1248 10/09/23  0536   SODIUM 136 139   K 5.2 5.2   CL 93* 95*   CO2 38* 39*   AGAP 5 5   BUN 51* 50*   CREATININE 1.72* 1.50*   CALCIUM 9.5 9.7       Coags    No recent results     Additional Electrolytes  Recent Labs     10/08/23  0523 10/09/23  0536   MG 2.3 2.1   PHOS 3.7 4.3*   CAIONIZED 1.20  --           Blood Gas    No recent results  No recent results LFTs  Recent Labs     10/08/23  0523 10/09/23  0536   ALT 31 26   AST 25 21   ALKPHOS 275* 235*   ALB 2.5* 2.6*   TBILI 0.42 0.36       Infectious  Recent Labs     10/09/23  0536   PROCALCITONI 0.36*     Glucose  Recent Labs     10/08/23  0523 10/08/23  1248 10/09/23  0536   GLUC  [place], MD

## 2023-10-09 NOTE — PROGRESS NOTES
Medical Oncology/Hematology Progress Note  Joseph Mcgarry, female, 77 y. 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 CT AP with no lymphadenopathy; Brain MRI no lesion; stage II Large B Cell Lymphoma. She started her dose-adjusted systemic treatment, R-EPOCH, on 9/22/23 with the last dose on 9/25/23. She received Rituxan infusion on 9/27/23. Her CMP and uric acid have been unremarkable for tumor lysis. Overall, patient tolerated chemotherapy and immunotherapy very well. She is also s/p IR gastrostomy tube placement on 9/27/23. Started growth factor on 9/28/23 with last day on 10/6/23. WBC decreased at 0.21 with absolute neutrophil count at 0.00 on 10/2/23. Commence with neutropenic precautions. Patient afebrile, no complaints of bone pain from growth factor injections since 9/28/23. Plan to continue with with filgrastim daily until Nicholasberg improves. Patient is s/p trach exchage to cuffed trach per pulmonology. Of note, SpO2 at 95%. Her WBC and ANC improving daily. She is afebrile. Denied nausea, vomiting. Denies B-symptoms. Assessment and Plan  1. 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 Started with growth factor on 9/28/23. Filgrastim (Neupogen) ordered at 5 mg/kg (375 mg) rounded to 300 mcg. Discontinued as of 10/6/23.    2. Left Jugular Lymphadenopathy     3.   Neutropenia   - Resolved as of 10/6/23  -WBC 14.99 < 13.06 < 12.26 < 6.34 < 0.20 < 0.21.        - ANC  9.44 < 8.09 < 0.94 < 0.12 < 0.02 < 0.00  -S/p chemotherapy on 9/25/23, immunotherapy on 9/27/23  -Started with growth factor, Neupogen, on 9/28/23, d/c on 10/6/23      S/p systemic treatment: EPOCH-R Cycle 1 day 1-3: Etoposide, Doxorubicin, Vincristine , Cyclophosphamide , prednisone + Rituximab (23-23)    CBC  Hemoglobin 8.3 < 7.2 < 8.5 < 8.3 (10/2) < 9.4 () <11.4 <11.7 ()   Platelets 310 < 520< 77 (10/) <61 < 68 (10/2) < 158 < 219 ()    Imagin23 CT PE study Abdomen/pelvis- Hilar and mediastinal lymphadenopathy similar to prior. No acute findings in the abdomen or pelvis. Chronic lytic lesion in T12 vertebral body gradually progressing since  with chronic pathologic fracture.     23 CTA Chest Rt middle lobe consolidation (PNA), slight regression of previous hilar and mediastinal LNs (had recent PNA in 2023)      23 CT Soft Tissue Neck w contrast: soft tissue mass ~ 6.1 x 4.7 x 5.2 cm appears to be centered within Lt oropharynx involving multiple spaces and extends into the nasopharynx. .. multiple small jugular chain nodes on the left.      23 nasopharynx mass biopsy initial results positive for malignancy favor poorly differentiated carcinoma. Cannot exclude lymphoma. Flow cytometry pending.      PLAN:  -Patient has recovered with neutropenia. May stop neutropenic precautions. ANC 9.44, WBC 14.99. S/p filgrastim    -Proceed with Cycle 2 of dose-adjusted R-EPOCH on 10/13/23, Friday. Coordinated with inpatient Chemo team for administration. Appreciate chemo facilitator's efforts in assisting with coordinating with chemo pharmacy as well    -CBC daily. Transfuse for hemoglobin <7 g/dL      Review of Systems   Constitutional: Positive for fatigue. Negative for chills, diaphoresis and fever. HENT: Negative for ear pain and sore throat. Neck mass    Eyes: Negative for pain and visual disturbance. Respiratory: Negative for cough, chest tightness, shortness of breath and wheezing. Cardiovascular: Negative for chest pain and palpitations. Gastrointestinal: Negative for abdominal pain, blood in stool, diarrhea, nausea and vomiting. Genitourinary: Negative for difficulty urinating, dysuria and hematuria. Musculoskeletal: Negative for arthralgias and back pain. Skin: Negative for color change and rash. Neurological: Positive for weakness. Negative for dizziness, seizures, syncope and headaches. Psychiatric/Behavioral: Negative for agitation and decreased concentration. The patient is not nervous/anxious. All other systems reviewed and are negative.     Objective:     Medication Administration - last 24 hours from 10/08/2023 1210 to 10/09/2023 1210       Date/Time Order Dose Route Action Action by     10/09/2023 0825 EDT chlorhexidine (PERIDEX) 0.12 % oral rinse 15 mL 15 mL Mouth/Throat Given Zainab Diodoardo, RN     10/08/2023 2200 EDT chlorhexidine (PERIDEX) 0.12 % oral rinse 15 mL 15 mL Mouth/Throat Given Norphyliciae Maribel, RN     10/09/2023 8019 EDT busPIRone (BUSPAR) tablet 30 mg 30 mg Oral Given Zainab Laureano, RN     10/08/2023 2200 EDT busPIRone (BUSPAR) tablet 30 mg 30 mg Oral Given Ante Maribel, RN     10/08/2023 1700 EDT busPIRone (BUSPAR) tablet 30 mg 30 mg Oral Given Yuniel Grady     10/09/2023 0734 EDT levalbuterol Christine Calico) inhalation solution 1.25 mg 1.25 mg Nebulization Given Dianne Bonds, RT     10/09/2023 0207 EDT levalbuterol (Christine Calico) inhalation solution 1.25 mg 1.25 mg Nebulization Given Jazlyn Rapposelli, RT     10/08/2023 1913 EDT levalbuterol (Christine Calico) inhalation solution 1.25 mg 1.25 mg Nebulization Given Jazlyn Rapposelli, RT     10/08/2023 1350 EDT levalbuterol (XOPENEX) inhalation solution 1.25 mg 1.25 mg Nebulization Given Vanessa Norman, RT     10/09/2023 0736 EDT ipratropium (ATROVENT) 0.02 % inhalation solution 0.5 mg 0.5 mg Nebulization Given Dianne Bonds, RT     10/09/2023 0208 EDT ipratropium (ATROVENT) 0.02 % inhalation solution 0.5 mg 0.5 mg Nebulization Given Jazlyn Foy, RT     10/08/2023 1913 EDT ipratropium (ATROVENT) 0.02 % inhalation solution 0.5 mg 0.5 mg Nebulization Given Jazlyn Danii, RT     10/08/2023 1350 EDT ipratropium (ATROVENT) 0.02 % inhalation solution 0.5 mg 0.5 mg Nebulization Given Vanessa Norman, RT     10/09/2023 0826 EDT gabapentin (NEURONTIN) capsule 300 mg 300 mg Oral Given Zainab Laureano, RN     10/08/2023 2200 EDT gabapentin (NEURONTIN) capsule 300 mg 300 mg Oral Given Sreekanth Seth, RN     10/08/2023 1501 EDT gabapentin (NEURONTIN) capsule 300 mg 300 mg Oral Given [facility]     10/08/2023 2200 EDT senna oral syrup 8.8 mg 8.8 mg Per NG Tube Given Sreekanth Seth, RN     10/09/2023 0825 EDT guaiFENesin (ROBITUSSIN) oral liquid 200 mg 200 mg Oral Given Zainab Georgi, RN     10/09/2023 0238 EDT guaiFENesin (ROBITUSSIN) oral liquid 200 mg 200 mg Oral Given Sreekanth Seth, RN     10/08/2023 2200 EDT guaiFENesin (ROBITUSSIN) oral liquid 200 mg 200 mg Oral Given Sreekanth Seth, RN     10/08/2023 1501 EDT guaiFENesin (ROBITUSSIN) oral liquid 200 mg 200 mg Oral Given [facility]     10/09/2023 6036 EDT famotidine (PEPCID) tablet 20 mg 20 mg Oral Given Zainab Laureano, RN     10/08/2023 1722 EDT famotidine (PEPCID) tablet 20 mg 20 mg Oral Given [facility]     10/09/2023 0535 EDT oxyCODONE (ROXICODONE) oral solution 5 mg 5 mg Oral Given Sreekanth Seth, RN     10/08/2023 2159 EDT oxyCODONE (ROXICODONE) oral solution 5 mg 5 mg Oral Given Sreekanth Seth, RN     10/08/2023 1501 EDT oxyCODONE (ROXICODONE) oral solution 5 mg 5 mg Oral Given [facility]     10/09/2023 0734 EDT sodium chloride 3 % inhalation solution 4 mL 4 mL Nebulization Given Claudean Pages, RT     10/09/2023 0207 EDT sodium chloride 3 % inhalation solution 4 mL 4 mL Nebulization Given Jazlyn Foy, RT     10/08/2023 1913 EDT sodium chloride 3 % inhalation solution 4 mL 4 mL Nebulization Given Jazlyn Foy, RT     10/08/2023 1350 EDT sodium chloride 3 % inhalation solution 4 mL 4 mL Nebulization Given [address], RT     10/09/2023 0826 EDT sertraline (ZOLOFT) tablet 75 mg 75 mg Per PEG Tube Given Zainab Laureano RN     10/08/2023 2200 EDT QUEtiapine (SEROquel) tablet 50 mg 50 mg Oral Given Cristina Crawford RN     10/09/2023 0825 EDT enoxaparin (LOVENOX) subcutaneous injection 30 mg 30 mg Subcutaneous Given Zainab Laureano RN     10/09/2023 0825 EDT nicotine (NICODERM CQ) 14 mg/24hr TD 24 hr patch 14 mg 14 mg Transdermal Medication Applied Zainab Laureano RN     10/09/2023 0238 EDT furosemide (LASIX) injection 40 mg 40 mg Intravenous Given Cristina Crawford RN     10/09/2023 1151 EDT furosemide (LASIX) injection 40 mg 40 mg Intravenous Given Zainab Laureano RN          /61 (BP Location: Left arm)   Pulse 105   Temp 99.3 °F (37.4 °C)   Resp 22   Ht 5' 1" (1.549 m)   Wt 80.4 kg (177 lb 4 oz)   SpO2 95%   BMI 33.49 kg/m²       Physical Exam  Constitutional:       Appearance: She is not ill-appearing. HENT:      Head: Normocephalic and atraumatic. Comments: Trach cuff in place     Right Ear: External ear normal.      Left Ear: External ear normal.      Nose: No congestion or rhinorrhea. Mouth/Throat:      Mouth: Mucous membranes are moist.   Eyes:      Extraocular Movements: Extraocular movements intact. Conjunctiva/sclera: Conjunctivae normal.      Pupils: Pupils are equal, round, and reactive to light. Neck:      Comments: cuffed Shiley XLT #7 on 10/3/23  Cardiovascular:      Rate and Rhythm: Normal rate and regular rhythm. Pulses: Normal pulses. Heart sounds: Normal heart sounds. No murmur heard. No gallop. Pulmonary:      Effort: Pulmonary effort is normal. No respiratory distress. Breath sounds: No wheezing, rhonchi or rales. Abdominal:      General: Bowel sounds are normal. There is no distension. Palpations: There is no mass. Tenderness: There is no abdominal tenderness. There is no guarding.       Comments: Peg tube in place, on tube feeding   Musculoskeletal:      Right lower leg: No edema. Left lower leg: No edema. Skin:     General: Skin is warm. Capillary Refill: Capillary refill takes less than 2 seconds. Coloration: Skin is not jaundiced or pale. Findings: No rash. Neurological:      General: No focal deficit present. Mental Status: She is alert and oriented to person, place, and time. Psychiatric:         Mood and Affect: Mood normal.         Behavior: Behavior normal.         Thought Content: Thought content normal.         Judgment: Judgment normal.         Recent Results (from the past 48 hour(s))   Calcium, ionized    Collection Time: 10/08/23  5:23 AM   Result Value Ref Range    Calcium, Ionized 1.20 1. 12 - 1.32 mmol/L   CBC    Collection Time: 10/08/23  5:23 AM   Result Value Ref Range    WBC 13.06 (H) 4.31 - 10.16 Thousand/uL    RBC 2.67 (L) 3.81 - 5.12 Million/uL    Hemoglobin 8.3 (L) 11.5 - 15.4 g/dL    Hematocrit 26.4 (L) 34.8 - 46.1 %    MCV 99 (H) 82 - 98 fL    MCH 31.1 26.8 - 34.3 pg    MCHC 31.4 31.4 - 37.4 g/dL    RDW 17.2 (H) 11.6 - 15.1 %    Platelets 451 508 - 645 Thousands/uL    MPV 10.1 8.9 - 12.7 fL   Phosphorus    Collection Time: 10/08/23  5:23 AM   Result Value Ref Range    Phosphorus 3.7 2.3 - 4.1 mg/dL   Magnesium    Collection Time: 10/08/23  5:23 AM   Result Value Ref Range    Magnesium 2.3 1.9 - 2.7 mg/dL   Comprehensive metabolic panel    Collection Time: 10/08/23  5:23 AM   Result Value Ref Range    Sodium 135 135 - 147 mmol/L    Potassium 5.3 3.5 - 5.3 mmol/L    Chloride 94 (L) 96 - 108 mmol/L    CO2 37 (H) 21 - 32 mmol/L    ANION GAP 4 mmol/L    BUN 51 (H) 5 - 25 mg/dL    Creatinine 1.64 (H) 0.60 - 1.30 mg/dL    Glucose 131 65 - 140 mg/dL    Calcium 9.2 8.4 - 10.2 mg/dL    Corrected Calcium 10.4 (H) 8.3 - 10.1 mg/dL    AST 25 13 - 39 U/L    ALT 31 7 - 52 U/L    Alkaline Phosphatase 275 (H) 34 - 104 U/L    Total Protein 5.6 (L) 6.4 - 8.4 g/dL    Albumin 2.5 (L) 3.5 - 5.0 g/dL    Total Bilirubin 0.42 0.20 - 1.00 mg/dL    eGFR 31 ml/min/1.73sq m   Basic metabolic panel    Collection Time: 10/08/23 12:48 PM   Result Value Ref Range    Sodium 136 135 - 147 mmol/L    Potassium 5.2 3.5 - 5.3 mmol/L    Chloride 93 (L) 96 - 108 mmol/L    CO2 38 (H) 21 - 32 mmol/L    ANION GAP 5 mmol/L    BUN 51 (H) 5 - 25 mg/dL    Creatinine 1.72 (H) 0.60 - 1.30 mg/dL    Glucose 119 65 - 140 mg/dL    Calcium 9.5 8.4 - 10.2 mg/dL    eGFR 29 ml/min/1.73sq m   Procalcitonin    Collection Time: 10/09/23  5:36 AM   Result Value Ref Range    Procalcitonin 0.36 (H) <=0.25 ng/ml   CBC and differential    Collection Time: 10/09/23  5:36 AM   Result Value Ref Range    WBC 14.99 (H) 4.31 - 10.16 Thousand/uL    RBC 2.72 (L) 3.81 - 5.12 Million/uL    Hemoglobin 8.3 (L) 11.5 - 15.4 g/dL    Hematocrit 26.8 (L) 34.8 - 46.1 %    MCV 99 (H) 82 - 98 fL    MCH 30.5 26.8 - 34.3 pg    MCHC 31.0 (L) 31.4 - 37.4 g/dL    RDW 17.0 (H) 11.6 - 15.1 %    MPV 10.2 8.9 - 12.7 fL    Platelets 041 536 - 397 Thousands/uL   Comprehensive metabolic panel    Collection Time: 10/09/23  5:36 AM   Result Value Ref Range    Sodium 139 135 - 147 mmol/L    Potassium 5.2 3.5 - 5.3 mmol/L    Chloride 95 (L) 96 - 108 mmol/L    CO2 39 (H) 21 - 32 mmol/L    ANION GAP 5 mmol/L    BUN 50 (H) 5 - 25 mg/dL    Creatinine 1.50 (H) 0.60 - 1.30 mg/dL    Glucose 140 65 - 140 mg/dL    Calcium 9.7 8.4 - 10.2 mg/dL    Corrected Calcium 10.8 (H) 8.3 - 10.1 mg/dL    AST 21 13 - 39 U/L    ALT 26 7 - 52 U/L    Alkaline Phosphatase 235 (H) 34 - 104 U/L    Total Protein 5.6 (L) 6.4 - 8.4 g/dL    Albumin 2.6 (L) 3.5 - 5.0 g/dL    Total Bilirubin 0.36 0.20 - 1.00 mg/dL    eGFR 35 ml/min/1.73sq m   Magnesium    Collection Time: 10/09/23  5:36 AM   Result Value Ref Range    Magnesium 2.1 1.9 - 2.7 mg/dL   Phosphorus    Collection Time: 10/09/23  5:36 AM   Result Value Ref Range    Phosphorus 4.3 (H) 2.3 - 4.1 mg/dL   Lipid panel    Collection Time: 10/09/23  5:36 AM   Result Value Ref Range    Cholesterol 118 See Comment mg/dL    Triglycerides 144 See Comment mg/dL    HDL, Direct 14 (L) >=50 mg/dL    LDL Calculated 75 0 - 100 mg/dL    Non-HDL-Chol (CHOL-HDL) 104 mg/dl   Manual Differential(PHLEBS Do Not Order)    Collection Time: 10/09/23  5:36 AM   Result Value Ref Range    Segmented % 50 43 - 75 %    Bands % 13 (H) 0 - 8 %    Lymphocytes % 9 (L) 14 - 44 %    Monocytes % 10 4 - 12 %    Eosinophils, % 0 0 - 6 %    Basophils % 0 0 - 1 %    Metamyelocytes% 7 (H) 0 - 1 %    Myelocytes % 9 (H) 0 - 1 %    Promyelocytes % 1 (H) 0 - 0 %    Atypical Lymphocytes % 1 (H) <=0 %    Absolute Neutrophils 9.44 (H) 1.85 - 7.62 Thousand/uL    Lymphocytes Absolute 1.50 0.60 - 4.47 Thousand/uL    Monocytes Absolute 1.50 (H) 0.00 - 1.22 Thousand/uL    Eosinophils Absolute 0.00 0.00 - 0.40 Thousand/uL    Basophils Absolute 0.00 0.00 - 0.10 Thousand/uL    Total Counted      Toxic Granulation Present     RBC Morphology Present     Platelet Estimate Adequate Adequate    Anisocytosis Present     Hypochromia Present     Poikilocytes Present        US right upper quadrant    Result Date: 9/22/2023  Narrative: RIGHT UPPER QUADRANT ULTRASOUND INDICATION:     CT finding N/V +Cancino. COMPARISON: CT abdomen/pelvis 9/21/2023 TECHNIQUE:   Real-time ultrasound of the right upper quadrant was performed with a curvilinear transducer with both volumetric sweeps and still imaging techniques. FINDINGS: Evaluation limited as per sonographer's note in PACS. PANCREAS:  Visualized portions of the pancreas are within normal limits. AORTA AND IVC:  Visualized portions are normal for patient age. LIVER: Size:  Within normal range. The liver measures 15.3 cm in the midclavicular line. Contour:  Surface contour is smooth. Parenchyma:  Echogenicity and echotexture are within normal limits. No liver mass identified. Limited imaging of the main portal vein shows it to be patent and hepatopetal. BILIARY: The gallbladder is normal in caliber.  Gallbladder wall thickness upper limits of normal. No pericholecystic fluid. There is gallbladder sludge without evidence for shadowing calculi. No sonographic Cancino sign. No intrahepatic biliary dilatation. CBD measures 4.0 mm. No choledocholithiasis. KIDNEY: Right kidney measures 11.9 x 5.2 x 6.4 cm. Volume 207.6 mL Several simple cysts, the largest of which measures 4.1 cm. 2.8 cm septated cyst in the upper pole. No hydronephrosis or perinephric collection. ASCITES:   None. Impression: No cholelithiasis. Gallbladder sludge is present with wall thickness upper limits of normal. Negative sonographic Cancino sign. Findings may be sequela of chronic gallbladder dysfunction. Consider follow-up with a HIDA scan if it would alter patient management. Workstation performed: [id number]     CT abdomen pelvis w contrast    Result Date: 9/21/2023  Narrative: CT ABDOMEN AND PELVIS WITH IV CONTRAST INDICATION:   Head/neck cancer, staging lymohoma staging prior to treatment with chemo. COMPARISON: 9/13/2023. TECHNIQUE:  CT examination of the abdomen and pelvis was performed. Multiplanar 2D reformatted images were created from the source data. This examination, like all CT scans performed in the [facility], was performed utilizing techniques to minimize radiation dose exposure, including the use of iterative reconstruction and automated exposure control. Radiation dose length product (DLP) for this visit:  813 mGy-cm IV Contrast:  100 mL of iohexol (OMNIPAQUE) Enteric Contrast:  Enteric contrast was not administered. FINDINGS: Mildly degraded by respiratory motion. ABDOMEN LOWER CHEST: There is bibasilar atelectasis. LIVER/BILIARY TREE:  Unremarkable. GALLBLADDER:  No calcified gallstones. There may be mild gallbladder wall thickening though evaluation is degraded by respiratory motion. No surrounding inflammatory changes. SPLEEN:  Unremarkable. PANCREAS:  Unremarkable. ADRENAL GLANDS:  Unremarkable.  KIDNEYS/URETERS: Multiple bilateral renal cysts. No hydronephrosis. STOMACH AND BOWEL: There is colonic diverticulosis without evidence of acute diverticulitis. APPENDIX:  No findings to suggest appendicitis. ABDOMINOPELVIC CAVITY:  No ascites. No pneumoperitoneum. No lymphadenopathy. VESSELS:  Unremarkable for patient's age. PELVIS REPRODUCTIVE ORGANS:  Unremarkable for patient's age. URINARY BLADDER:  Unremarkable. ABDOMINAL WALL/INGUINAL REGIONS:  Fat containing right inguinal hernia noted. Fat-containing umbilical hernia. OSSEOUS STRUCTURES: Again seen is a destructive lytic lesion in the T12 vertebral body with sclerotic borders, progressed from 2013 and with chronic appearing pathologic fracture. Impression: 1. No abdominal lymphadenopathy. 2.  Question gallbladder wall thickening versus motion artifact. If there is clinical concern for gallbladder pathology, ultrasound is recommended. 3.  Chronic T12 lytic lesion with pathologic fracture. Workstation performed: [id number]     MRI soft tissue neck wo and w contrast    Result Date: 9/21/2023  Narrative: MRI OF THE SOFT TISSUES OF THE NECK - WITH AND WITHOUT CONTRAST INDICATION: Oropharyngeal mass s/p biopsy (+) for carcinoma COMPARISON: Neck CT from 9/14/2023 TECHNIQUE:  Multiplanar, multisequence imaging of the soft tissues of the neck was performed before and after gadolinium administration. . IV Contrast:  7.5 mL of Gadobutrol injection (SINGLE-DOSE) IMAGE QUALITY: Motion degrades images. FINDINGS: VISUALIZED BRAIN PARENCHYMA: No acute or suspicious findings. Please see today's brain MR report. VISUALIZED ORBITS AND PARANASAL SINUSES: Globes and orbits are within normal limits. Pan paranasal sinus mucosal thickening, greatest involving ethmoids and sphenoids. NASAL CAVITY, NASOPHARYNX, and OROHYPOPHARYNX and LARYNX: Approximately 7.5 x 4.0 x 7.8 cm (length X depth X width) soft tissue mass, epicenter likely the left lateral pharyngeal recess. Enhances and restricts diffusion.  Central, irregular fluid signal intensity area without enhancement appears contiguous with fluid around an endotracheal tube, likely trapped secretions and circumferential mass. Mass extends from the left greater than right nasopharynx superiorly to the cricoid cartilage inferiorly. Extends to the posterior nasal cavity. Displaces the soft palate, uvula and tongue base anteriorly. Oral cavity is otherwise within normal limits. Displaces the left pterygoid muscles anterolaterally. Extends posterior to the angle of the mandible approximately 1.4 cm, abutting and mildly laterally displacing the deep parotid, inseparable from the gland. Effaces the left parapharyngeal fat. Discrete epiglottis not seen, grossly anteriorly displaced. Extends along the left aryepiglottic fold and posterior pharyngeal wall to the to the inferior supraglottic airway, grossly terminating just above or at the left false cord. Extends to the left carotid space, encircles the carotid with severe narrowing and posterior-lateral displacement of the distal cervical and most proximal petrous vessel. Otherwise preserved left internal carotid flow-void. Posterior-lateral displacement  and occlusion of the internal jugular vein at the level of the angle of the mandible in the distal neck. Mass extends to the proximal jugular foramen. Laryngeal ventricles and true cords appear preserved. Normal fat signal  preserved in the cricoid and thyroid cartilages. Enteric and endotracheal tubes are displaced rightward. Trace retropharyngeal effusion. THYROID GLAND:   Within normal limits. PAROTID AND SUBMANDIBULAR GLANDS: Both submandibular and the right parotid glands are within normal limits. Deep left parotid involvement mentioned above. LYMPH NODES: Similar small jugular chain nodes more numerous on the left than the right, but none considered pathologically enlarged. 0.7 x 0.5 cm suspicious right retropharyngeal node (4/27).  Left retropharyngeal space is obliterated by mass, no separate adenopathy. VASCULAR STRUCTURES: Left carotid a jugular involvement described above. Right carotid artery and jugular veins are within normal limits. THORACIC INLET: Similar to CT. Dependent lung opacities. CERVICAL SPINE: Normal appearance. OTHER: Left greater than right mastoid effusions with patchy left enhancement and restricted diffusion were described in today's brain MR report. Asymmetric opacity involves the left petrous apex. Normal appearance of the IACs and inner ear structures. No cerebellopontine angle mass. Impression: Known, large left neck mass described in detail above. Workstation performed: [id number]     MRI brain w wo contrast    Result Date: 9/21/2023  Narrative: MRI BRAIN WITHOUT AND WITH CONTRAST INDICATION:  Oropharyngeal mass s/p biopsy (+) for carcinoma . COMPARISON: 6/17/2017 CT of the brain TECHNIQUE:  Multiplanar/multisequence MRI of the brain was performed administration of contrast. IV Contrast:  7.5 mL of Gadobutrol injection (SINGLE-DOSE) IMAGE QUALITY:  Diagnostic. FINDINGS: BRAIN PARENCHYMA: No  hemorrhage, extra-axial fluid collection, acute infarct, mass effect or midline shift. Mild, focal patchy periventricular and subcortical white matter foci of abnormal T2 and FLAIR hyperintensity are nonspecific, but usually secondary to changes of microangiopathy. Small developmental venous angioma in the left posterior frontal lobe, typically clinically insignificant. No suspicious enhancement or masses. VENTRICLES:  Within normal limits for age. SELLA AND PITUITARY GLAND:  Within normal limits. ORBITS:  Within normal limits. PARANASAL SINUSES: Mucosal thickening. VASCULATURE: Preserved skull base flow voids. Normal enhancement. CALVARIUM AND SKULL BASE: Bilateral mastoid effusions are likely secondary mass, related to eustachian tube obstruction from nasopharyngeal mass. Patient is also intubated. Small mucosal retention cyst at the right fossa of Rosenmuller. Small ill-defined foci of enhancement in the superior mastoid and asymmetric left mastoid restricted diffusion. No coalescence or discrete mass. Skull and visualized cervical spine are within normal limits. EXTRACRANIAL SOFT TISSUES:  A nasopharyngeal mass will be described in further detail on today's neck MR report. Impression: No evidence of brain metastases. 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 Asymmetric patchy enhancement and restricted diffusion in the left mastoid. The differential includes neoplastic involvement and infection. Workstation performed: [id number]     XR chest portable    Result Date: 9/20/2023  Narrative: CHEST INDICATION:   NG tube placement. COMPARISON: 9/17/2023 EXAM PERFORMED/VIEWS:  XR CHEST PORTABLE FINDINGS: Tracheostomy tube is in stable position. Distal portions of nasogastric tube are seen below the hemidiaphragm within the left upper abdomen. The tip of the tube extends beyond the inferior margin of this study. Heart shadow is enlarged but unchanged from prior exam. There is mild pulmonary vascular congestion. The left costophrenic angle is obscured. Hazy opacities are additionally noted within the right costophrenic angle. No evidence of acute osseous abnormality. Impression: 1. Nasogastric tube is seen with its distal portions within the stomach. The tip of the tube courses beyond the inferior margin of the study. 2. Pulmonary vascular congestion with obscuration of the left hemidiaphragm. This is stable from prior radiograph and may represent small left effusion versus consolidation. 3. Right basilar atelectasis versus trace right effusion. Workstation performed: [id number]     XR chest portable    Result Date: 9/18/2023  Narrative: CHEST INDICATION:   Assess. COMPARISON:  None EXAM PERFORMED/VIEWS:  XR CHEST PORTABLE Images: 2 FINDINGS: Tracheostomy tube is seen in appropriate position.  A feeding tube is seen extending down below the dome of the diaphragm Cardiomegaly seen Increased lung markings seen suggest congestion left base is obscured Osseous structures appear within normal limits for patient age. Impression: Increased lung markings seen suggest congestion. The left base is obscured No worsening Workstation performed: [id number]     CT soft tissue neck w contrast    Result Date: 9/14/2023  Narrative: CT NECK WITH CONTRAST INDICATION:   Laryngeal edema COMPARISON:  None. TECHNIQUE:  Axial, sagittal, and coronal 2D reformatted images were created from the axial source data and submitted for interpretation. Radiation dose length product (DLP) for this visit:  769 mGy-cm . This examination, like all CT scans performed in the [facility], was performed utilizing techniques to minimize radiation dose exposure, including the use of iterative reconstruction and automated exposure control. IV Contrast:  85 mL of iohexol (OMNIPAQUE) IMAGE QUALITY:  Diagnostic. FINDINGS: VISUALIZED BRAIN PARENCHYMA:  No acute intracranial pathology of the visualized brain parenchyma. VISUALIZED ORBITS: Normal visualized orbits. PARANASAL SINUSES: Normal visualized paranasal sinuses. Nasopharynx, oropharynx AND HYPOPHARYNX: There is a large heterogeneously enhancing mass identified within the neck involving multiple spaces. The origin is difficult to ascertain given the large size. This mass measures approximately 6.1 x 4.7 x 5.2 cm and appears to be centered within the left oropharynx. The mass extends superiorly into the nasopharynx left more so than right and into the posterior aspect of the nasal cavity. The mass also extends across the midline within the oropharynx and inferiorly into the left lateral oropharynx but not below the laryngeal ventricle.  The mass extends laterally into the infratemporal fossa and parapharyngeal fat and is inseparable from infratemporal musculature and deep lobe of the parotid gland. There is extension into the prevertebral space where the mass is inseparable from the anterior paraspinal muscle on the left and posteriorly and laterally into the carotid space where the mass is displacing and inseparable from jugular and carotid. The mass encases the cervical internal carotid artery just below the level of the skull base resulting in narrowing of the vessel and the mass compresses and occludes the jugular vein below the skull base. The vessel does reconstitute via collateral vessels as it  extends inferiorly within the neck. The mass extends superiorly into the skull base inseparable from the carotid canal and jugular foramen. There is severe airway compromise within the posterior oral cavity and superior oropharynx. ET tube and OG tube are present. THYROID GLAND:  Unremarkable. PAROTID AND SUBMANDIBULAR GLANDS: Normal parotid and submandibular glands other than the mass abutting the deep lobe of the left parotid gland. LYMPH NODES: Multiple small jugular chain nodes are seen on the left. VASCULAR STRUCTURES: As described above the mass partially encases the cervical internal carotid artery, narrowing the vessel and occludes the jugular vein from the skull base to the mid neck. Below this the vessel is unremarkable due to collateral reconstitution. THORACIC INLET: Posterior left upper lobe consolidation may represent atelectasis or pneumonia. Mild patchy groundglass opacity within the upper lung fields. BONY STRUCTURES: At T1-2 there is a left paramedian bridging osteophyte resulting in mild to moderate left anterior lateral canal stenosis. Impression: Large enhancing soft tissue mass identified within the left neck involving multiple spaces. This appears to be centered within the left oropharynx with extensions as described above into multiple spaces. This results in significant airway compromise. 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  Small level 3 and level 4 nodes are seen within the neck. I personally discussed this study with ICU resident on 9/14/2023 4:44 PM. Workstation performed: [id number]     Bronchoscopy    Result Date: 9/14/2023  Narrative: Table formatting from the original result was not included. [address] [place] [address] [phone] DATE OF SERVICE: 9/14/23 PHYSICIAN(S): Attending: No Staff Documented Fellow: No Staff Documented INDICATION: Acute respiratory failure with hypoxia ([address]) POST-OP DIAGNOSIS: See the impression below. PREPROCEDURE: Standard airway preparation completed per respiratory therapy protocol. Informed consent was obtained. Images reviewed prior to the procedure. A Time Out was performed. No suspicion or identified risk for TB or other airborne infectious disease; bronchoscopy procedure being performed for diagnostic purposes. PROCEDURE: Bronchoscopy DETAILS OF PROCEDURE: Patient was taken to the procedure room where a time out was performed to confirm correct patient and correct procedure. The patient was already under sedation prior to the procedure. The patient's blood pressure, ECG, heart rate, level of consciousness, respirations and oxygen were monitored throughout the procedure. The patient experienced no blood loss. The scope was introduced through the endotracheal tube. The procedure was moderately difficult due to patient intolerance and persistent coughing. In response to procedure difficulty, deeper sedation was employed. The patient tolerated the procedure well. There were no apparent adverse events. ANESTHESIA INFORMATION: ASA: ASA status not filed in the log. Anesthesia Type: Anesthesia type not filed in the log.  FINDINGS: The lower trachea, main sujata, left main stem, KARTHIK, lingula, LLL, right main stem, RUL, bronchus intermedius, RML and RLL appeared normal. Left lower lung zone with no endobronchial lesions, left upper and lingula both appear normal Right upper, right middle and right lower lobes all appeared normal with no endobronchial lesions Endotracheal tube was retracted 3 cm under direct visualization with the bronchoscope Bronchoalveolar lavage was performed x1 in the right lateral segment (RB4) with 60 mL of saline instilled and a total return of 40 mL; the fluid appeared cloudy SPECIMENS: ID Type Source Tests Collected by Time Destination 1 :  Lavage Lung, Right Middle Lobe Bronchoalveolar Lavage PULMONARY CYTOLOGY Yaima Rodrigues MD 9/14/2023 12:55 PM  A :  Bronchial Lung, Right Middle Lobe Bronchoalveolar Lavage FUNGAL CULTURE, VIRUS CULTURE, BRONCHIAL CULTURE AND GRAM STAIN, LEGIONELLA CULTURE, PNEUMOCYSTIS SMEAR BY DFA ([facility]), AFB CULTURE WITH STAIN Yaima Rodrigues MD 9/14/2023 12:57 PM       Impression: BAL of the right middle lobe Endotracheal tube was retracted under direct visualization Tongue still appears swollen, vocal cords visualized outside of the endotracheal tube with the bronchoscope, no significant edema or mass noted RECOMMENDATION:  Follow-up infectious work-up      XR chest 1 view portable    Result Date: 9/13/2023  Narrative: CHEST INDICATION:   post intubation. COMPARISON: Same day chest radiograph and subsequent same day CT chest. Chest x-ray 8/27/2023. EXAM PERFORMED/VIEWS:  XR CHEST PORTABLE FINDINGS: Endotracheal tube terminates approximately 4 cm above the sujata. Heart shadow is enlarged but unchanged from prior exam. Bibasilar airspace opacities are again demonstrated. No appreciable pneumothorax. No evidence of acute osseous abnormality. Lucent lesion within T12 is better demonstrated on same-day CT. Impression: 1. Endotracheal tube terminates 4 cm above the sujata. 2. Redemonstration of bibasilar airspace opacities. Workstation performed: [id number]     XR chest 1 view portable    Result Date: 9/13/2023  Narrative: CHEST INDICATION:   post intubation, adjusting ET Tube.  COMPARISON:  None EXAM PERFORMED/VIEWS:  XR CHEST PORTABLE FINDINGS: Endotracheal tube terminates approximately 3 cm above the sujata. Cardiomediastinal silhouette appears unremarkable. Bibasilar airspace opacities are again noted. No appreciable pneumothorax or pleural effusion. No evidence of acute osseous abnormality. Lucent lesion within T12 is better demonstrated on same-day CT. Impression: 1. Endotracheal tube terminates 3.3 cm above the sujata. 2. Bibasilar airspace opacities are again demonstrated. Workstation performed: [id number]     XR chest portable    Result Date: 9/13/2023  Narrative: CHEST INDICATION:   sob. COMPARISON: 8/27/2023. Subsequent CT chest performed the same day. EXAM PERFORMED/VIEWS:  XR CHEST PORTABLE FINDINGS: Heart shadow is enlarged but unchanged from prior exam. Hazy opacities are noted within the right lung base, possibly atelectasis versus pneumonia. Left basilar opacity is similar to prior radiograph. No appreciable pneumothorax. No evidence of acute osseous abnormality. Cystic lesion within the T12 vertebral body is better characterized on same-day CT. Impression: 1. Hazy bibasilar airspace opacities which may represent atelectasis versus pneumonia. Left basilar opacity is similar to recent radiograph while right basilar opacity is new Workstation performed: [id number]     CTA ED chest PE Study    Result Date: 9/13/2023  Narrative: CTA - CHEST WITH IV CONTRAST - PULMONARY ANGIOGRAM INDICATION:   R/O PE. Reports shortness of breath since being discharged from the hospital 3 weeks ago for pneumonia. Fatigue. Productive cough with white sputum. Angioedema. COMPARISON: Chest radiograph 9/13/2023, chest CT 8/25/2023, thoracic spine CT 11/13/2013. TECHNIQUE: CT angiogram timed for optimal opacification of the pulmonary arteries. Axial, sagittal, and coronal 2D reformats created from source data. Coronal 3D MIP postprocessing on the acquisition scanner. Radiation dose length product (DLP):  472 mGy-cm .   Radiation dose exposure minimized using iterative reconstruction and automated exposure control. IV Contrast:  85 mL of iohexol (OMNIPAQUE) FINDINGS: PULMONARY ARTERIES:  No pulmonary embolus. Moderate pulmonary artery enlargement. LUNGS: Mild patchy new consolidation in the medial segment right middle lobe. Improving opacity in the left upper lobe with mild residual atelectasis/scar. Redemonstration of mild dependent atelectasis in both lower lobes. Severe emphysema. AIRWAYS: No significant filling defects. ET tube 4 cm above the sujata. PLEURA:  Unremarkable. HEART/GREAT VESSELS: Moderate cardiomegaly. MEDIASTINUM AND PRASANTH: Slight regression of hilar and mediastinal lymphadenopathy. The largest node was previously 2.5 x 2.0 cm in the right infrahilar region and is now 1.9 x 1.3 cm (501/94). CHEST WALL AND LOWER NECK: Unremarkable. UPPER ABDOMEN: Right renal cysts. OSSEOUS STRUCTURES: Redemonstration of the large cystic lesion with sclerotic margins in T12 with destruction of the superior and inferior endplates, present as a much smaller thin-walled cystic lesion on the thoracic spine CT from 2013, imaged on a thoracic spine MRI from 2020. Impression: No pulmonary embolus. Patchy consolidation right middle lobe, new from 8/25/2023, compatible with pneumonia. Slight regression of previous hilar and mediastinal lymphadenopathy. Improving left upper lobe opacity which may have been due to pneumonia with residual atelectasis/scar. Persistent partial atelectasis of the lower lobes. 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 Workstation performed: [id number]     Echo complete w/ contrast if indicated    Result Date: 8/28/2023  Narrative: •  Left Ventricle: Left ventricular cavity size is small. Wall thickness is increased. There is severe concentric hypertrophy. The left ventricular ejection fraction is 60%.  Systolic function is normal. Wall motion is normal. Diastolic function is mildly abnormal, consistent with grade I (abnormal) relaxation. There is no LV dynamic obstruction at rest. •  Right Ventricle: Right ventricular cavity size is normal. Systolic function is normal. •  Left Atrium: The atrium is mildly dilated. •  Mitral Valve: There is mild to moderate regurgitation. There is systolic anterior motion of the chordal apparatus with late peaking gradient 29 mmHg during Valsalva maneuver. •  Pericardium: There is a trivial pericardial effusion along the right atrial free wall. XR chest portable ICU    Result Date: 8/28/2023  Narrative: CHEST INDICATION:   Acute hypoxic respiratory failure. COMPARISON: 8/25/2023 EXAM PERFORMED/VIEWS:  XR CHEST PORTABLE ICU FINDINGS: Heart shadow is enlarged but unchanged from prior exam. There is stable left-sided volume loss secondary to left basilar atelectasis. No pneumothorax or pleural effusion. Osseous structures appear within normal limits for patient age. Impression: Stable volume loss on the left secondary to left lower lobe atelectasis. Workstation performed: [id number]     VAS lower limb venous duplex study, complete bilateral    Result Date: 8/27/2023  Narrative:  [facility] REPORT CLINICAL: Indications: Patient presents with bilateral lower extremity pain x 1 days. Operative History: cardiac surgery-date unknown. Risk Factors The patient has history of HTN and CKD. She has no history of Hyperlipidemia. CONCLUSION:  Impression: RIGHT LOWER LIMB: No evidence of acute or chronic deep vein thrombosis. No evidence of superficial thrombophlebitis noted. Doppler evaluation shows a normal response to augmentation maneuvers. . Popliteal, posterior tibial and anterior tibial arterial Doppler waveform's are triphasic. LEFT LOWER LIMB: No evidence of acute or chronic deep vein thrombosis. No evidence of superficial thrombophlebitis noted. Doppler evaluation shows a normal response to augmentation maneuvers. Popliteal, posterior tibial and anterior tibial arterial Doppler waveform's are triphasic. SIGNATURE: Electronically Signed by: Tamanna Hunnewell on 2023-08-27 08:12:52 PM    CTA ED chest PE Study    Result Date: 8/25/2023  Narrative: CTA - CHEST WITH IV CONTRAST - PULMONARY ANGIOGRAM INDICATION:   Increased SOB, recent COVID diagnosis. COMPARISON: MRI from 3/18/2020 as well as bone scan from 7/2/2019 and thoracic spine from 11/13/2013 TECHNIQUE: CTA examination of the chest was performed using angiographic technique according to a protocol specifically tailored to evaluate for pulmonary embolism. Multiplanar 2D reformatted images were created from the source data. In addition, coronal 3D MIP postprocessing was performed on the acquisition scanner. The study is limited by some respiratory motion artifacts especially in the lower lobes on the left where there is crowding of vessels due to atelectatic changes. Radiation dose length product (DLP) for this visit:  370 mGy-cm . This examination, like all CT scans performed in the [facility], was performed utilizing techniques to minimize radiation dose exposure, including the use of iterative reconstruction and automated exposure control. IV Contrast:  80 mL of iohexol (OMNIPAQUE) FINDINGS: PULMONARY ARTERIAL TREE: Limited visualization left lower lobe however there is suspicion for a small embolus in the posterobasal segment on axial image 139, series 305 and coronal image 142. No definite filling defect is seen elsewhere. The main pulmonary artery is significantly dilated at 4.2 cm. The RV LV ratio is elevated at 1.1 cm. The primary pulmonary vascular stent minutes are also dilated chronicity is uncertain. LUNGS: Emphysema is noted with blebs at the apices. There is peripheral consolidation in the left upper lobe.   Air bronchogram is not well seen and there is mucous occlusion of the left mainstem bronchus with volume loss of the left upper lobe as well as  left lower lobe to a lesser extent. Some aeration in the left lower lobe is still visible. PLEURA:  Unremarkable. HEART/GREAT VESSELS:  Unremarkable for patient's age. No thoracic aortic aneurysm. MEDIASTINUM AND PRASANTH: 10 mm pretracheal node is identified. 9 mm superior mediastinal node is identified. 10 mm prevascular node is identified. Subcarinal node measures 1.5 cm. Hilar adenopathy is also demonstrated. Right hilar node is larger measuring 1.8 cm in short axis on image 138. CHEST WALL AND LOWER NECK:   Unremarkable. VISUALIZED STRUCTURES IN THE UPPER ABDOMEN: Low-density cyst is seen in the right kidney. . OSSEOUS STRUCTURES: There appears to be a destructive lesion involving the T12 vertebral body eroding both endplates and much of the vertebral body this does not appear to represent a Schmorl's node. A cystic lesion was noted in 2013 at this location however the current lesion is larger with loss of endplates. MRI also imaged this lesion in 2020 the lesion appears somewhat larger on the current examination however. Impression: Limited study. There is equivocal embolus in the posterior basal segment left lower lobe. Other smaller segments are difficult to assess due to motion and vascular crowding. There is mucous plugging in the left mainstem bronchus with volume loss in portions of the left lower lobe and left upper lobe. Aspiration pneumonia is not excluded. There is dilatation of the main pulmonary artery and proximal pulmonary segment suggesting pulmonary hypertension this is more likely chronic than acute given the degree of distention. Emphysema is present. There is significant mediastinal and hilar adenopathy suggesting underlying neoplasm more likely than simple reactive nodes. Enlarging lesion at T12 is again demonstrated of uncertain etiology. This has been present since 2013. Perhaps a plasmacytoma is a consideration. Neoplastic work-up is advised.  Pulmonology consultation is also advised to assess endobronchial obstruction on the left. This was discussed with resident physician Dr. Raul Black at 4:58 p.m. Follow-up was marked in the epic system Workstation performed: [id number]     XR chest 1 view portable    Result Date: 8/25/2023  Narrative: CHEST INDICATION:   SOB. COMPARISON: 8/21/2021 EXAM PERFORMED/VIEWS:  XR CHEST PORTABLE Single view FINDINGS: Development of CHF. Probable left basal infiltrate. Cardiomediastinal silhouette has become more enlarged. No pneumothorax or pleural effusion. Osseous structures appear within normal limits for patient age. Impression: Increased cardiomegaly. Development of CHF. Probable left basal infiltrate Workstation performed: [id number]       I have personally reviewed labs, imaging studies, and pertinent reports. This note has been generated by voice recognition software system. Therefore, there may be spelling, grammar, and or syntax errors. Please contact if questions arise.      Darra Lundborg, DO   Hematology and Medical Oncology - PGY [place]

## 2023-10-10 LAB
ALBUMIN SERPL BCP-MCNC: 2.7 G/DL (ref 3.5–5)
ALP SERPL-CCNC: 201 U/L (ref 34–104)
ALT SERPL W P-5'-P-CCNC: 24 U/L (ref 7–52)
ANION GAP SERPL CALCULATED.3IONS-SCNC: 7 MMOL/L
ANISOCYTOSIS BLD QL SMEAR: PRESENT
AST SERPL W P-5'-P-CCNC: 24 U/L (ref 13–39)
BASOPHILS # BLD MANUAL: 0 THOUSAND/UL (ref 0–0.1)
BASOPHILS NFR MAR MANUAL: 0 % (ref 0–1)
BILIRUB SERPL-MCNC: 0.39 MG/DL (ref 0.2–1)
BUN SERPL-MCNC: 51 MG/DL (ref 5–25)
CA-I BLD-SCNC: 1.15 MMOL/L (ref 1.12–1.32)
CALCIUM ALBUM COR SERPL-MCNC: 10.5 MG/DL (ref 8.3–10.1)
CALCIUM SERPL-MCNC: 9.5 MG/DL (ref 8.4–10.2)
CHLORIDE SERPL-SCNC: 95 MMOL/L (ref 96–108)
CO2 SERPL-SCNC: 38 MMOL/L (ref 21–32)
CREAT SERPL-MCNC: 1.36 MG/DL (ref 0.6–1.3)
EOSINOPHIL # BLD MANUAL: 0 THOUSAND/UL (ref 0–0.4)
EOSINOPHIL NFR BLD MANUAL: 0 % (ref 0–6)
ERYTHROCYTE [DISTWIDTH] IN BLOOD BY AUTOMATED COUNT: 16.7 % (ref 11.6–15.1)
GFR SERPL CREATININE-BSD FRML MDRD: 39 ML/MIN/1.73SQ M
GLUCOSE SERPL-MCNC: 138 MG/DL (ref 65–140)
HCT VFR BLD AUTO: 26.1 % (ref 34.8–46.1)
HGB BLD-MCNC: 7.8 G/DL (ref 11.5–15.4)
HYPERCHROMIA BLD QL SMEAR: PRESENT
LYMPHOCYTES # BLD AUTO: 1.54 THOUSAND/UL (ref 0.6–4.47)
LYMPHOCYTES # BLD AUTO: 10 % (ref 14–44)
MAGNESIUM SERPL-MCNC: 2.1 MG/DL (ref 1.9–2.7)
MCH RBC QN AUTO: 29.8 PG (ref 26.8–34.3)
MCHC RBC AUTO-ENTMCNC: 29.9 G/DL (ref 31.4–37.4)
MCV RBC AUTO: 100 FL (ref 82–98)
METAMYELOCYTES NFR BLD MANUAL: 7 % (ref 0–1)
MONOCYTES # BLD AUTO: 1.39 THOUSAND/UL (ref 0–1.22)
MONOCYTES NFR BLD: 9 % (ref 4–12)
MYCOBACTERIUM SPEC CULT: NORMAL
MYELOCYTES NFR BLD MANUAL: 8 % (ref 0–1)
NEUTROPHILS # BLD MANUAL: 10.18 THOUSAND/UL (ref 1.85–7.62)
NEUTS BAND NFR BLD MANUAL: 8 % (ref 0–8)
NEUTS SEG NFR BLD AUTO: 58 % (ref 43–75)
PHOSPHATE SERPL-MCNC: 4.7 MG/DL (ref 2.3–4.1)
PLATELET # BLD AUTO: 182 THOUSANDS/UL (ref 149–390)
PLATELET BLD QL SMEAR: ADEQUATE
PMV BLD AUTO: 10.1 FL (ref 8.9–12.7)
POLYCHROMASIA BLD QL SMEAR: PRESENT
POTASSIUM SERPL-SCNC: 5.1 MMOL/L (ref 3.5–5.3)
PROT SERPL-MCNC: 5.6 G/DL (ref 6.4–8.4)
RBC # BLD AUTO: 2.62 MILLION/UL (ref 3.81–5.12)
RBC MORPH BLD: PRESENT
RHODAMINE-AURAMINE STN SPEC: NORMAL
SODIUM SERPL-SCNC: 140 MMOL/L (ref 135–147)
WBC # BLD AUTO: 15.42 THOUSAND/UL (ref 4.31–10.16)

## 2023-10-10 PROCEDURE — NC001 PR NO CHARGE: Performed by: STUDENT IN AN ORGANIZED HEALTH CARE EDUCATION/TRAINING PROGRAM

## 2023-10-10 PROCEDURE — 82330 ASSAY OF CALCIUM: CPT

## 2023-10-10 PROCEDURE — 94668 MNPJ CHEST WALL SBSQ: CPT

## 2023-10-10 PROCEDURE — 94760 N-INVAS EAR/PLS OXIMETRY 1: CPT

## 2023-10-10 PROCEDURE — 94150 VITAL CAPACITY TEST: CPT

## 2023-10-10 PROCEDURE — 80053 COMPREHEN METABOLIC PANEL: CPT | Performed by: PHARMACIST

## 2023-10-10 PROCEDURE — 83735 ASSAY OF MAGNESIUM: CPT | Performed by: PHARMACIST

## 2023-10-10 PROCEDURE — 85007 BL SMEAR W/DIFF WBC COUNT: CPT | Performed by: PHARMACIST

## 2023-10-10 PROCEDURE — 94640 AIRWAY INHALATION TREATMENT: CPT

## 2023-10-10 PROCEDURE — 94669 MECHANICAL CHEST WALL OSCILL: CPT

## 2023-10-10 PROCEDURE — 84100 ASSAY OF PHOSPHORUS: CPT | Performed by: PHARMACIST

## 2023-10-10 PROCEDURE — 99232 SBSQ HOSP IP/OBS MODERATE 35: CPT | Performed by: INTERNAL MEDICINE

## 2023-10-10 PROCEDURE — 99232 SBSQ HOSP IP/OBS MODERATE 35: CPT | Performed by: STUDENT IN AN ORGANIZED HEALTH CARE EDUCATION/TRAINING PROGRAM

## 2023-10-10 PROCEDURE — 85027 COMPLETE CBC AUTOMATED: CPT | Performed by: PHARMACIST

## 2023-10-10 RX ORDER — FUROSEMIDE 10 MG/ML
40 INJECTION INTRAMUSCULAR; INTRAVENOUS ONCE
Status: COMPLETED | OUTPATIENT
Start: 2023-10-10 | End: 2023-10-10

## 2023-10-10 RX ORDER — SODIUM CHLORIDE, SODIUM GLUCONATE, SODIUM ACETATE, POTASSIUM CHLORIDE, MAGNESIUM CHLORIDE, SODIUM PHOSPHATE, DIBASIC, AND POTASSIUM PHOSPHATE .53; .5; .37; .037; .03; .012; .00082 G/100ML; G/100ML; G/100ML; G/100ML; G/100ML; G/100ML; G/100ML
500 INJECTION, SOLUTION INTRAVENOUS ONCE
Status: COMPLETED | OUTPATIENT
Start: 2023-10-10 | End: 2023-10-10

## 2023-10-10 RX ORDER — SODIUM CHLORIDE, SODIUM GLUCONATE, SODIUM ACETATE, POTASSIUM CHLORIDE, MAGNESIUM CHLORIDE, SODIUM PHOSPHATE, DIBASIC, AND POTASSIUM PHOSPHATE .53; .5; .37; .037; .03; .012; .00082 G/100ML; G/100ML; G/100ML; G/100ML; G/100ML; G/100ML; G/100ML
500 INJECTION, SOLUTION INTRAVENOUS ONCE
Status: DISCONTINUED | OUTPATIENT
Start: 2023-10-10 | End: 2023-10-10

## 2023-10-10 RX ORDER — ENOXAPARIN SODIUM 100 MG/ML
40 INJECTION SUBCUTANEOUS
Status: DISCONTINUED | OUTPATIENT
Start: 2023-10-11 | End: 2023-11-15

## 2023-10-10 RX ADMIN — GUAIFENESIN 200 MG: 200 SOLUTION ORAL at 03:00

## 2023-10-10 RX ADMIN — GUAIFENESIN 200 MG: 200 SOLUTION ORAL at 14:38

## 2023-10-10 RX ADMIN — ENOXAPARIN SODIUM 30 MG: 30 INJECTION SUBCUTANEOUS at 08:25

## 2023-10-10 RX ADMIN — SODIUM CHLORIDE SOLN NEBU 3% 4 ML: 3 NEBU SOLN at 19:09

## 2023-10-10 RX ADMIN — OXYCODONE HYDROCHLORIDE 5 MG: 5 SOLUTION ORAL at 21:29

## 2023-10-10 RX ADMIN — IPRATROPIUM BROMIDE 0.5 MG: 0.5 SOLUTION RESPIRATORY (INHALATION) at 07:08

## 2023-10-10 RX ADMIN — CHLORHEXIDINE GLUCONATE 15 ML: 1.2 SOLUTION ORAL at 08:25

## 2023-10-10 RX ADMIN — IPRATROPIUM BROMIDE 0.5 MG: 0.5 SOLUTION RESPIRATORY (INHALATION) at 13:42

## 2023-10-10 RX ADMIN — FAMOTIDINE 20 MG: 20 TABLET, FILM COATED ORAL at 17:12

## 2023-10-10 RX ADMIN — IPRATROPIUM BROMIDE 0.5 MG: 0.5 SOLUTION RESPIRATORY (INHALATION) at 02:52

## 2023-10-10 RX ADMIN — QUETIAPINE FUMARATE 50 MG: 25 TABLET ORAL at 21:30

## 2023-10-10 RX ADMIN — LEVALBUTEROL HYDROCHLORIDE 1.25 MG: 1.25 SOLUTION RESPIRATORY (INHALATION) at 02:52

## 2023-10-10 RX ADMIN — GABAPENTIN 300 MG: 300 CAPSULE ORAL at 17:12

## 2023-10-10 RX ADMIN — LEVALBUTEROL HYDROCHLORIDE 1.25 MG: 1.25 SOLUTION RESPIRATORY (INHALATION) at 07:08

## 2023-10-10 RX ADMIN — Medication 8.8 MG: at 21:29

## 2023-10-10 RX ADMIN — FAMOTIDINE 20 MG: 20 TABLET, FILM COATED ORAL at 08:26

## 2023-10-10 RX ADMIN — GABAPENTIN 300 MG: 300 CAPSULE ORAL at 21:31

## 2023-10-10 RX ADMIN — ACETAMINOPHEN 650 MG: 325 TABLET, FILM COATED ORAL at 22:20

## 2023-10-10 RX ADMIN — GUAIFENESIN 200 MG: 200 SOLUTION ORAL at 08:25

## 2023-10-10 RX ADMIN — SODIUM CHLORIDE SOLN NEBU 3% 4 ML: 3 NEBU SOLN at 13:42

## 2023-10-10 RX ADMIN — SODIUM CHLORIDE, SODIUM GLUCONATE, SODIUM ACETATE, POTASSIUM CHLORIDE, MAGNESIUM CHLORIDE, SODIUM PHOSPHATE, DIBASIC, AND POTASSIUM PHOSPHATE 500 ML: .53; .5; .37; .037; .03; .012; .00082 INJECTION, SOLUTION INTRAVENOUS at 21:24

## 2023-10-10 RX ADMIN — GABAPENTIN 300 MG: 300 CAPSULE ORAL at 08:26

## 2023-10-10 RX ADMIN — OXYCODONE HYDROCHLORIDE 5 MG: 5 SOLUTION ORAL at 14:38

## 2023-10-10 RX ADMIN — GUAIFENESIN 200 MG: 200 SOLUTION ORAL at 21:29

## 2023-10-10 RX ADMIN — LEVALBUTEROL HYDROCHLORIDE 1.25 MG: 1.25 SOLUTION RESPIRATORY (INHALATION) at 19:09

## 2023-10-10 RX ADMIN — BUSPIRONE HYDROCHLORIDE 30 MG: 10 TABLET ORAL at 17:12

## 2023-10-10 RX ADMIN — NICOTINE 14 MG: 14 PATCH, EXTENDED RELEASE TRANSDERMAL at 08:26

## 2023-10-10 RX ADMIN — BUSPIRONE HYDROCHLORIDE 30 MG: 10 TABLET ORAL at 08:26

## 2023-10-10 RX ADMIN — SODIUM CHLORIDE SOLN NEBU 3% 4 ML: 3 NEBU SOLN at 07:08

## 2023-10-10 RX ADMIN — BUSPIRONE HYDROCHLORIDE 30 MG: 10 TABLET ORAL at 22:20

## 2023-10-10 RX ADMIN — SODIUM CHLORIDE SOLN NEBU 3% 4 ML: 3 NEBU SOLN at 02:53

## 2023-10-10 RX ADMIN — SERTRALINE 75 MG: 25 TABLET, FILM COATED ORAL at 08:26

## 2023-10-10 RX ADMIN — SODIUM CHLORIDE, SODIUM GLUCONATE, SODIUM ACETATE, POTASSIUM CHLORIDE, MAGNESIUM CHLORIDE, SODIUM PHOSPHATE, DIBASIC, AND POTASSIUM PHOSPHATE 500 ML: .53; .5; .37; .037; .03; .012; .00082 INJECTION, SOLUTION INTRAVENOUS at 16:56

## 2023-10-10 RX ADMIN — LEVALBUTEROL HYDROCHLORIDE 1.25 MG: 1.25 SOLUTION RESPIRATORY (INHALATION) at 13:42

## 2023-10-10 RX ADMIN — CHLORHEXIDINE GLUCONATE 15 ML: 1.2 SOLUTION ORAL at 21:30

## 2023-10-10 RX ADMIN — OXYCODONE HYDROCHLORIDE 5 MG: 5 SOLUTION ORAL at 05:35

## 2023-10-10 RX ADMIN — FUROSEMIDE 40 MG: 10 INJECTION, SOLUTION INTRAMUSCULAR; INTRAVENOUS at 08:26

## 2023-10-10 RX ADMIN — IPRATROPIUM BROMIDE 0.5 MG: 0.5 SOLUTION RESPIRATORY (INHALATION) at 19:09

## 2023-10-10 NOTE — PROGRESS NOTES
Medical Oncology/Hematology Progress Note  Burke Gonzalez, female, 77 y. 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 CT AP with no lymphadenopathy; Brain MRI no lesion; stage II Large B Cell Lymphoma. She started her dose-adjusted systemic treatment, R-EPOCH, on 9/22/23 with the last dose on 9/25/23. She received Rituxan infusion on 9/27/23. Her CMP and uric acid have been unremarkable for tumor lysis. Overall, patient tolerated chemotherapy and immunotherapy very well. She is also s/p IR gastrostomy tube placement on 9/27/23. Started growth factor on 9/28/23 with last day on 10/6/23. WBC decreased at 0.21 with absolute neutrophil count at 0.00 on 10/2/23. Commence with neutropenic precautions. Patient afebrile, no complaints of bone pain from growth factor injections since 9/28/23. Plan to continue with with filgrastim daily until Nicholasberg improves. Patient is s/p trach exchage to cuffed trach per pulmonology. She continues to be on a ventilator. Of note, SpO2 at 93%. Her WBC and ANC currently elevated, most likely due to her filgrastim. She continues to be afebrile, but tachycardic. Denies nausea, vomiting. She reported feeling overwhelmed and distressed about her overall clinical picture. Assessment and Plan  1. 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 Started with growth factor on 9/28/23. Filgrastim (Neupogen) ordered at 5 mg/kg (375 mg) rounded to 300 mcg. Discontinued as of 10/6/23.    2. Left Jugular Lymphadenopathy     3.   Neutropenia   - Resolved as of 10/6/23  -WBC 15.42 (10/10) <14.99 < 13.06 < 12.26 < 6.34 < 0.20 < 0.21.        - ANC 10.18 <  9.44 < 8.09 < 0.94 < 0.12 < 0.02 < 0.00  -S/p chemotherapy on 23, immunotherapy on 23  -Started with growth factor, Neupogen, on 23, d/c on 10/6/23        S/p systemic treatment: EPOCH-R Cycle 1 day 1-3: Etoposide, Doxorubicin, Vincristine , Cyclophosphamide , prednisone + Rituximab (23-23)    CBC  Hemoglobin 7.8 (10/10) <8.3 < 7.2 < 8.5 < 8.3 (10/2) < 9.4 () <11.4 <11.7 ()   Platelets 593 (82/40) <167 < 127< 77 (10/4) <61 < 68 (10/2) < 158 < 219 ()    Imagin23 CT PE study Abdomen/pelvis- Hilar and mediastinal lymphadenopathy similar to prior. No acute findings in the abdomen or pelvis. Chronic lytic lesion in T12 vertebral body gradually progressing since  with chronic pathologic fracture.     23 CTA Chest Rt middle lobe consolidation (PNA), slight regression of previous hilar and mediastinal LNs (had recent PNA in 2023)      23 CT Soft Tissue Neck w contrast: soft tissue mass ~ 6.1 x 4.7 x 5.2 cm appears to be centered within Lt oropharynx involving multiple spaces and extends into the nasopharynx. .. multiple small jugular chain nodes on the left.      23 nasopharynx mass biopsy initial results positive for malignancy favor poorly differentiated carcinoma. Cannot exclude lymphoma. Flow cytometry pending.      PLAN:  -Recommend CT soft tissue neck to assess tx response. Last imaging form 23.    -Plan in place for Cycle 2 of dose-adjusted R-EPOCH on 10/13/23, Friday. Inpatient Chemo team aware of the plan. -CBC daily. Transfuse for hemoglobin <7 g/dL    -Patient expressed distress that she continues to experience setbacks despite doing well with her chemotherapy. Discussed in length about her other options if she does not wish to pursue treatment. Briefly spoke about goals of care along with the concept of comfort-directed measures. She was given reassurance that she is in charge of her own affairs, and will follow her lead.   Patient expressed gratitude for the honesty, but wishes to pursue systemic treatment at this time. -Recommend a visit from [facility] care. Albeit she is a bit anxious with the trajectory of her hospital stay, she finds solace in holding on to her rachel      -Appreciate assistance from Case Management with her ongoing plans for discharge to rehab.    -left message for patient's daughter, Alton Nielsen. Review of Systems   Constitutional: Positive for fatigue. Negative for chills, diaphoresis and fever. HENT: Negative for ear pain and sore throat. Neck mass    Eyes: Negative for pain and visual disturbance. Respiratory: Negative for cough, chest tightness, shortness of breath and wheezing. Cardiovascular: Negative for chest pain and palpitations. Gastrointestinal: Negative for abdominal pain, blood in stool, diarrhea, nausea and vomiting. Genitourinary: Negative for difficulty urinating, dysuria and hematuria. Musculoskeletal: Negative for arthralgias and back pain. Skin: Negative for color change and rash. Neurological: Positive for weakness. Negative for dizziness, seizures, syncope and headaches. Psychiatric/Behavioral: Negative for agitation and decreased concentration. The patient is nervous/anxious. All other systems reviewed and are negative.     Objective:     Medication Administration - last 24 hours from 10/09/2023 1406 to 10/10/2023 1406       Date/Time Order Dose Route Action Action by     10/10/2023 0825 EDT chlorhexidine (PERIDEX) 0.12 % oral rinse 15 mL 15 mL Mouth/Throat Given Zainab Laureano RN     10/09/2023 2133 EDT chlorhexidine (PERIDEX) 0.12 % oral rinse 15 mL 15 mL Mouth/Throat Given Nancie Hyde RN     10/10/2023 0826 EDT busPIRone (BUSPAR) tablet 30 mg 30 mg Oral Given Zainab Laureano RN     10/09/2023 2130 EDT busPIRone (BUSPAR) tablet 30 mg 30 mg Oral Given Nancie Hyde RN     10/09/2023 1800 EDT busPIRone (BUSPAR) tablet 30 mg 30 mg Oral Given Elise Figueroa RN 10/10/2023 1342 EDT levalbuterol (XOPENEX) inhalation solution 1.25 mg 1.25 mg Nebulization Given Sarita Hippo, RT     10/10/2023 0708 EDT levalbuterol (Ericka [facility]) inhalation solution 1.25 mg 1.25 mg Nebulization Given Sarita [facility], RT     10/10/2023 0252 EDT levalbuterol (XOPENEX) inhalation solution 1.25 mg 1.25 mg Nebulization Given Aguilar Reddish, RT     10/09/2023 1935 EDT levalbuterol (Ericka [facility]) inhalation solution 1.25 mg 1.25 mg [facility]     10/10/2023 1342 EDT ipratropium (ATROVENT) 0.02 % inhalation solution 0.5 mg 0.5 mg Nebulization Given [facility], RT     10/10/2023 0708 EDT ipratropium (ATROVENT) 0.02 % inhalation solution 0.5 mg 0.5 mg Nebulization Given [facility], RT     10/10/2023 0252 EDT ipratropium (ATROVENT) 0.02 % inhalation solution 0.5 mg 0.5 mg Nebulization Given Aguilar Reddish, RT     10/09/2023 1935 EDT ipratropium (ATROVENT) 0.02 % inhalation solution 0.5 mg 0.5 mg Nebulization Given Jazlyn Pratima, RT     10/10/2023 0826 EDT gabapentin (NEURONTIN) capsule 300 mg 300 mg Oral Given Zainab Laureano, RN     10/09/2023 2132 EDT gabapentin (NEURONTIN) capsule 300 mg 300 mg Oral Given Carlos Garces, RN     10/09/2023 1759 EDT gabapentin (NEURONTIN) capsule 300 mg 300 mg Oral Given Zainab Laureano, RN     10/09/2023 2131 EDT senna oral syrup 8.8 mg 8.8 mg Per NG Tube Given Carlos Garces, RN     10/10/2023 0825 EDT guaiFENesin (ROBITUSSIN) oral liquid 200 mg 200 mg Oral Given Zainab Laureano, RN     10/10/2023 0300 EDT guaiFENesin (ROBITUSSIN) oral liquid 200 mg 200 mg Oral Given Carlos Garces, RN     10/09/2023 2100 EDT guaiFENesin (ROBITUSSIN) oral liquid 200 mg 200 mg Oral Given Carlos Garces, TINO     10/09/2023 1445 EDT guaiFENesin (ROBITUSSIN) oral liquid 200 mg 200 mg Oral Given Zainab Laureano, TINO     10/10/2023 3181 EDT famotidine (PEPCID) tablet 20 mg 20 mg Oral Given Zainab Laureano, TINO     10/09/2023 1759 EDT famotidine (PEPCID) tablet 20 mg 20 mg Oral Given Josefoscarmendez Moises, RN     10/10/2023 0535 EDT oxyCODONE (ROXICODONE) oral solution 5 mg 5 mg [facility]     10/09/2023 2132 EDT oxyCODONE (ROXICODONE) oral solution 5 mg 5 mg [facility]     10/09/2023 1445 EDT oxyCODONE (ROXICODONE) oral solution 5 mg 5 mg Oral Given Zainab Waldronmoe, RN     10/10/2023 1342 EDT sodium chloride 3 % inhalation solution 4 mL 4 mL Nebulization Given [facility], RT     10/10/2023 0708 EDT sodium chloride 3 % inhalation solution 4 mL 4 mL Nebulization Given [facility], RT     10/10/2023 0253 EDT sodium chloride 3 % inhalation solution 4 mL 4 mL Nebulization Given Izzy Villela, RT     10/09/2023 1935 EDT sodium chloride 3 % inhalation solution 4 mL 4 mL Nebulization Given Jazlyn Foy, RT     10/10/2023 0826 EDT sertraline (ZOLOFT) tablet 75 mg 75 mg Per PEG Tube Given Zainab Waldronmoe, RN     10/09/2023 2133 EDT QUEtiapine (SEROquel) tablet 50 mg 50 mg Oral Given Collin [facility], RN     10/10/2023 0825 EDT enoxaparin (LOVENOX) subcutaneous injection 30 mg 30 mg Subcutaneous Given Zainab Laureano, RN     10/10/2023 0826 EDT nicotine (NICODERM CQ) 14 mg/24hr TD 24 hr patch 14 mg 14 mg Transdermal Medication Applied Zainab Fordtommymoe, RN     10/10/2023 0824 EDT nicotine (NICODERM CQ) 14 mg/24hr TD 24 hr patch 14 mg 14 mg Transdermal Patch Removed Zainab Georgi, RN     10/10/2023 6526 EDT furosemide (LASIX) injection 40 mg 40 mg Intravenous Given Zainab Fordaria, RN          /66   Pulse (!) 112   Temp 100 °F (37.8 °C)   Resp 14   Ht 5' 1" (1.549 m)   Wt 81.3 kg (179 lb 3.7 oz)   SpO2 93%   BMI 33.87 kg/m²       Physical Exam  Constitutional:       Appearance: She is not ill-appearing. HENT:      Head: Normocephalic and atraumatic. Comments: Trach cuff in place     Right Ear: External ear normal.      Left Ear: External ear normal.      Nose: No congestion or rhinorrhea.       Mouth/Throat: Mouth: Mucous membranes are moist.   Eyes:      Extraocular Movements: Extraocular movements intact. Conjunctiva/sclera: Conjunctivae normal.      Pupils: Pupils are equal, round, and reactive to light. Neck:      Comments: cuffed Federico XLT #7 on 10/3/23  Cardiovascular:      Rate and Rhythm: Regular rhythm. Tachycardia present. Pulses: Normal pulses. Heart sounds: No murmur heard. No gallop. Pulmonary:      Effort: Pulmonary effort is normal. No respiratory distress. Breath sounds: No wheezing, rhonchi or rales. Comments: Vented  Abdominal:      General: Bowel sounds are normal. There is no distension. Palpations: There is no mass. Tenderness: There is no abdominal tenderness. There is no guarding. Comments: Peg tube in place, on tube feeding   Musculoskeletal:      Right lower leg: No edema. Left lower leg: No edema. Skin:     General: Skin is warm. Capillary Refill: Capillary refill takes less than 2 seconds. Coloration: Skin is not jaundiced or pale. Findings: No rash. Neurological:      General: No focal deficit present. Mental Status: She is alert and oriented to person, place, and time. Psychiatric:         Behavior: Behavior normal.         Thought Content:  Thought content normal.         Judgment: Judgment normal.      Comments: tearful         Recent Results (from the past 48 hour(s))   Procalcitonin    Collection Time: 10/09/23  5:36 AM   Result Value Ref Range    Procalcitonin 0.36 (H) <=0.25 ng/ml   CBC and differential    Collection Time: 10/09/23  5:36 AM   Result Value Ref Range    WBC 14.99 (H) 4.31 - 10.16 Thousand/uL    RBC 2.72 (L) 3.81 - 5.12 Million/uL    Hemoglobin 8.3 (L) 11.5 - 15.4 g/dL    Hematocrit 26.8 (L) 34.8 - 46.1 %    MCV 99 (H) 82 - 98 fL    MCH 30.5 26.8 - 34.3 pg    MCHC 31.0 (L) 31.4 - 37.4 g/dL    RDW 17.0 (H) 11.6 - 15.1 %    MPV 10.2 8.9 - 12.7 fL    Platelets 396 120 - 778 Thousands/uL Comprehensive metabolic panel    Collection Time: 10/09/23  5:36 AM   Result Value Ref Range    Sodium 139 135 - 147 mmol/L    Potassium 5.2 3.5 - 5.3 mmol/L    Chloride 95 (L) 96 - 108 mmol/L    CO2 39 (H) 21 - 32 mmol/L    ANION GAP 5 mmol/L    BUN 50 (H) 5 - 25 mg/dL    Creatinine 1.50 (H) 0.60 - 1.30 mg/dL    Glucose 140 65 - 140 mg/dL    Calcium 9.7 8.4 - 10.2 mg/dL    Corrected Calcium 10.8 (H) 8.3 - 10.1 mg/dL    AST 21 13 - 39 U/L    ALT 26 7 - 52 U/L    Alkaline Phosphatase 235 (H) 34 - 104 U/L    Total Protein 5.6 (L) 6.4 - 8.4 g/dL    Albumin 2.6 (L) 3.5 - 5.0 g/dL    Total Bilirubin 0.36 0.20 - 1.00 mg/dL    eGFR 35 ml/min/1.73sq m   Magnesium    Collection Time: 10/09/23  5:36 AM   Result Value Ref Range    Magnesium 2.1 1.9 - 2.7 mg/dL   Phosphorus    Collection Time: 10/09/23  5:36 AM   Result Value Ref Range    Phosphorus 4.3 (H) 2.3 - 4.1 mg/dL   Lipid panel    Collection Time: 10/09/23  5:36 AM   Result Value Ref Range    Cholesterol 118 See Comment mg/dL    Triglycerides 144 See Comment mg/dL    HDL, Direct 14 (L) >=50 mg/dL    LDL Calculated 75 0 - 100 mg/dL    Non-HDL-Chol (CHOL-HDL) 104 mg/dl   Manual Differential(PHLEBS Do Not Order)    Collection Time: 10/09/23  5:36 AM   Result Value Ref Range    Segmented % 50 43 - 75 %    Bands % 13 (H) 0 - 8 %    Lymphocytes % 9 (L) 14 - 44 %    Monocytes % 10 4 - 12 %    Eosinophils, % 0 0 - 6 %    Basophils % 0 0 - 1 %    Metamyelocytes% 7 (H) 0 - 1 %    Myelocytes % 9 (H) 0 - 1 %    Promyelocytes % 1 (H) 0 - 0 %    Atypical Lymphocytes % 1 (H) <=0 %    Absolute Neutrophils 9.44 (H) 1.85 - 7.62 Thousand/uL    Lymphocytes Absolute 1.50 0.60 - 4.47 Thousand/uL    Monocytes Absolute 1.50 (H) 0.00 - 1.22 Thousand/uL    Eosinophils Absolute 0.00 0.00 - 0.40 Thousand/uL    Basophils Absolute 0.00 0.00 - 0.10 Thousand/uL    Total Counted      Toxic Granulation Present     RBC Morphology Present     Platelet Estimate Adequate Adequate    Anisocytosis Present Hypochromia Present     Poikilocytes Present    Comprehensive metabolic panel    Collection Time: 10/10/23  5:35 AM   Result Value Ref Range    Sodium 140 135 - 147 mmol/L    Potassium 5.1 3.5 - 5.3 mmol/L    Chloride 95 (L) 96 - 108 mmol/L    CO2 38 (H) 21 - 32 mmol/L    ANION GAP 7 mmol/L    BUN 51 (H) 5 - 25 mg/dL    Creatinine 1.36 (H) 0.60 - 1.30 mg/dL    Glucose 138 65 - 140 mg/dL    Calcium 9.5 8.4 - 10.2 mg/dL    Corrected Calcium 10.5 (H) 8.3 - 10.1 mg/dL    AST 24 13 - 39 U/L    ALT 24 7 - 52 U/L    Alkaline Phosphatase 201 (H) 34 - 104 U/L    Total Protein 5.6 (L) 6.4 - 8.4 g/dL    Albumin 2.7 (L) 3.5 - 5.0 g/dL    Total Bilirubin 0.39 0.20 - 1.00 mg/dL    eGFR 39 ml/min/1.73sq m   Magnesium    Collection Time: 10/10/23  5:35 AM   Result Value Ref Range    Magnesium 2.1 1.9 - 2.7 mg/dL   Phosphorus    Collection Time: 10/10/23  5:35 AM   Result Value Ref Range    Phosphorus 4.7 (H) 2.3 - 4.1 mg/dL   CBC and differential    Collection Time: 10/10/23  5:35 AM   Result Value Ref Range    WBC 15.42 (H) 4.31 - 10.16 Thousand/uL    RBC 2.62 (L) 3.81 - 5.12 Million/uL    Hemoglobin 7.8 (L) 11.5 - 15.4 g/dL    Hematocrit 26.1 (L) 34.8 - 46.1 %     (H) 82 - 98 fL    MCH 29.8 26.8 - 34.3 pg    MCHC 29.9 (L) 31.4 - 37.4 g/dL    RDW 16.7 (H) 11.6 - 15.1 %    MPV 10.1 8.9 - 12.7 fL    Platelets 345 102 - 172 Thousands/uL   Calcium, ionized    Collection Time: 10/10/23  5:35 AM   Result Value Ref Range    Calcium, Ionized 1.15 1.12 - 1.32 mmol/L   Manual Differential(PHLEBS Do Not Order)    Collection Time: 10/10/23  5:35 AM   Result Value Ref Range    Segmented % 58 43 - 75 %    Bands % 8 0 - 8 %    Lymphocytes % 10 (L) 14 - 44 %    Monocytes % 9 4 - 12 %    Eosinophils, % 0 0 - 6 %    Basophils % 0 0 - 1 %    Metamyelocytes% 7 (H) 0 - 1 %    Myelocytes % 8 (H) 0 - 1 %    Absolute Neutrophils 10.18 (H) 1.85 - 7.62 Thousand/uL    Lymphocytes Absolute 1.54 0.60 - 4.47 Thousand/uL    Monocytes Absolute 1.39 (H) 0.00 - 1.22 Thousand/uL    Eosinophils Absolute 0.00 0.00 - 0.40 Thousand/uL    Basophils Absolute 0.00 0.00 - 0.10 Thousand/uL    Total Counted      RBC Morphology Present     Platelet Estimate Adequate Adequate    Anisocytosis Present     Hypochromia Present     Polychromasia Present        US right upper quadrant    Result Date: 9/22/2023  Narrative: RIGHT UPPER QUADRANT ULTRASOUND INDICATION:     CT finding N/V +Cancino. COMPARISON: CT abdomen/pelvis 9/21/2023 TECHNIQUE:   Real-time ultrasound of the right upper quadrant was performed with a curvilinear transducer with both volumetric sweeps and still imaging techniques. FINDINGS: Evaluation limited as per sonographer's note in PACS. PANCREAS:  Visualized portions of the pancreas are within normal limits. AORTA AND IVC:  Visualized portions are normal for patient age. LIVER: Size:  Within normal range. The liver measures 15.3 cm in the midclavicular line. Contour:  Surface contour is smooth. Parenchyma:  Echogenicity and echotexture are within normal limits. No liver mass identified. Limited imaging of the main portal vein shows it to be patent and hepatopetal. BILIARY: The gallbladder is normal in caliber. Gallbladder wall thickness upper limits of normal. No pericholecystic fluid. There is gallbladder sludge without evidence for shadowing calculi. No sonographic Cancino sign. No intrahepatic biliary dilatation. CBD measures 4.0 mm. No choledocholithiasis. KIDNEY: Right kidney measures 11.9 x 5.2 x 6.4 cm. Volume 207.6 mL Several simple cysts, the largest of which measures 4.1 cm. 2.8 cm septated cyst in the upper pole. No hydronephrosis or perinephric collection. ASCITES:   None. Impression: No cholelithiasis. Gallbladder sludge is present with wall thickness upper limits of normal. Negative sonographic Cancino sign. Findings may be sequela of chronic gallbladder dysfunction. Consider follow-up with a HIDA scan if it would alter patient management. Workstation performed: [id number]     CT abdomen pelvis w contrast    Result Date: 9/21/2023  Narrative: CT ABDOMEN AND PELVIS WITH IV CONTRAST INDICATION:   Head/neck cancer, staging lymohoma staging prior to treatment with chemo. COMPARISON: 9/13/2023. TECHNIQUE:  CT examination of the abdomen and pelvis was performed. Multiplanar 2D reformatted images were created from the source data. This examination, like all CT scans performed in the [facility], was performed utilizing techniques to minimize radiation dose exposure, including the use of iterative reconstruction and automated exposure control. Radiation dose length product (DLP) for this visit:  813 mGy-cm IV Contrast:  100 mL of iohexol (OMNIPAQUE) Enteric Contrast:  Enteric contrast was not administered. FINDINGS: Mildly degraded by respiratory motion. ABDOMEN LOWER CHEST: There is bibasilar atelectasis. LIVER/BILIARY TREE:  Unremarkable. GALLBLADDER:  No calcified gallstones. There may be mild gallbladder wall thickening though evaluation is degraded by respiratory motion. No surrounding inflammatory changes. SPLEEN:  Unremarkable. PANCREAS:  Unremarkable. ADRENAL GLANDS:  Unremarkable. KIDNEYS/URETERS: Multiple bilateral renal cysts. No hydronephrosis. STOMACH AND BOWEL: There is colonic diverticulosis without evidence of acute diverticulitis. APPENDIX:  No findings to suggest appendicitis. ABDOMINOPELVIC CAVITY:  No ascites. No pneumoperitoneum. No lymphadenopathy. VESSELS:  Unremarkable for patient's age. PELVIS REPRODUCTIVE ORGANS:  Unremarkable for patient's age. URINARY BLADDER:  Unremarkable. ABDOMINAL WALL/INGUINAL REGIONS:  Fat containing right inguinal hernia noted. Fat-containing umbilical hernia. OSSEOUS STRUCTURES: Again seen is a destructive lytic lesion in the T12 vertebral body with sclerotic borders, progressed from 2013 and with chronic appearing pathologic fracture. Impression: 1. No abdominal lymphadenopathy.  2. Question gallbladder wall thickening versus motion artifact. If there is clinical concern for gallbladder pathology, ultrasound is recommended. 3.  Chronic T12 lytic lesion with pathologic fracture. Workstation performed: [id number]     MRI soft tissue neck wo and w contrast    Result Date: 9/21/2023  Narrative: MRI OF THE SOFT TISSUES OF THE NECK - WITH AND WITHOUT CONTRAST INDICATION: Oropharyngeal mass s/p biopsy (+) for carcinoma COMPARISON: Neck CT from 9/14/2023 TECHNIQUE:  Multiplanar, multisequence imaging of the soft tissues of the neck was performed before and after gadolinium administration. . IV Contrast:  7.5 mL of Gadobutrol injection (SINGLE-DOSE) IMAGE QUALITY: Motion degrades images. FINDINGS: VISUALIZED BRAIN PARENCHYMA: No acute or suspicious findings. Please see today's brain MR report. VISUALIZED ORBITS AND PARANASAL SINUSES: Globes and orbits are within normal limits. Pan paranasal sinus mucosal thickening, greatest involving ethmoids and sphenoids. NASAL CAVITY, NASOPHARYNX, and OROHYPOPHARYNX and LARYNX: Approximately 7.5 x 4.0 x 7.8 cm (length X depth X width) soft tissue mass, epicenter likely the left lateral pharyngeal recess. Enhances and restricts diffusion. Central, irregular fluid signal intensity area without enhancement appears contiguous with fluid around an endotracheal tube, likely trapped secretions and circumferential mass. Mass extends from the left greater than right nasopharynx superiorly to the cricoid cartilage inferiorly. Extends to the posterior nasal cavity. Displaces the soft palate, uvula and tongue base anteriorly. Oral cavity is otherwise within normal limits. Displaces the left pterygoid muscles anterolaterally. Extends posterior to the angle of the mandible approximately 1.4 cm, abutting and mildly laterally displacing the deep parotid, inseparable from the gland. Effaces the left parapharyngeal fat. Discrete epiglottis not seen, grossly anteriorly displaced. Extends along the left aryepiglottic fold and posterior pharyngeal wall to the to the inferior supraglottic airway, grossly terminating just above or at the left false cord. Extends to the left carotid space, encircles the carotid with severe narrowing and posterior-lateral displacement of the distal cervical and most proximal petrous vessel. Otherwise preserved left internal carotid flow-void. Posterior-lateral displacement  and occlusion of the internal jugular vein at the level of the angle of the mandible in the distal neck. Mass extends to the proximal jugular foramen. Laryngeal ventricles and true cords appear preserved. Normal fat signal  preserved in the cricoid and thyroid cartilages. Enteric and endotracheal tubes are displaced rightward. Trace retropharyngeal effusion. THYROID GLAND:   Within normal limits. PAROTID AND SUBMANDIBULAR GLANDS: Both submandibular and the right parotid glands are within normal limits. Deep left parotid involvement mentioned above. LYMPH NODES: Similar small jugular chain nodes more numerous on the left than the right, but none considered pathologically enlarged. 0.7 x 0.5 cm suspicious right retropharyngeal node (4/27). Left retropharyngeal space is obliterated by mass, no separate adenopathy. VASCULAR STRUCTURES: Left carotid a jugular involvement described above. Right carotid artery and jugular veins are within normal limits. THORACIC INLET: Similar to CT. Dependent lung opacities. CERVICAL SPINE: Normal appearance. OTHER: Left greater than right mastoid effusions with patchy left enhancement and restricted diffusion were described in today's brain MR report. Asymmetric opacity involves the left petrous apex. Normal appearance of the IACs and inner ear structures. No cerebellopontine angle mass. Impression: Known, large left neck mass described in detail above.  Workstation performed: [id number]     MRI brain w wo contrast    Result Date: 9/21/2023  Narrative: MRI BRAIN WITHOUT AND WITH CONTRAST INDICATION:  Oropharyngeal mass s/p biopsy (+) for carcinoma . COMPARISON: 6/17/2017 CT of the brain TECHNIQUE:  Multiplanar/multisequence MRI of the brain was performed administration of contrast. IV Contrast:  7.5 mL of Gadobutrol injection (SINGLE-DOSE) IMAGE QUALITY:  Diagnostic. FINDINGS: BRAIN PARENCHYMA: No  hemorrhage, extra-axial fluid collection, acute infarct, mass effect or midline shift. Mild, focal patchy periventricular and subcortical white matter foci of abnormal T2 and FLAIR hyperintensity are nonspecific, but usually secondary to changes of microangiopathy. Small developmental venous angioma in the left posterior frontal lobe, typically clinically insignificant. No suspicious enhancement or masses. VENTRICLES:  Within normal limits for age. SELLA AND PITUITARY GLAND:  Within normal limits. ORBITS:  Within normal limits. PARANASAL SINUSES: Mucosal thickening. VASCULATURE: Preserved skull base flow voids. Normal enhancement. CALVARIUM AND SKULL BASE: Bilateral mastoid effusions are likely secondary mass, related to eustachian tube obstruction from nasopharyngeal mass. Patient is also intubated. Small mucosal retention cyst at the right fossa of Rosenmuller. Small ill-defined foci of enhancement in the superior mastoid and asymmetric left mastoid restricted diffusion. No coalescence or discrete mass. Skull and visualized cervical spine are within normal limits. EXTRACRANIAL SOFT TISSUES:  A nasopharyngeal mass will be described in further detail on today's neck MR report. Impression: No evidence of brain metastases. 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 Asymmetric patchy enhancement and restricted diffusion in the left mastoid. The differential includes neoplastic involvement and infection.  Workstation performed: [id number]     XR chest portable    Result Date: 9/20/2023  Narrative: CHEST INDICATION: NG tube placement. COMPARISON: 9/17/2023 EXAM PERFORMED/VIEWS:  XR CHEST PORTABLE FINDINGS: Tracheostomy tube is in stable position. Distal portions of nasogastric tube are seen below the hemidiaphragm within the left upper abdomen. The tip of the tube extends beyond the inferior margin of this study. Heart shadow is enlarged but unchanged from prior exam. There is mild pulmonary vascular congestion. The left costophrenic angle is obscured. Hazy opacities are additionally noted within the right costophrenic angle. No evidence of acute osseous abnormality. Impression: 1. Nasogastric tube is seen with its distal portions within the stomach. The tip of the tube courses beyond the inferior margin of the study. 2. Pulmonary vascular congestion with obscuration of the left hemidiaphragm. This is stable from prior radiograph and may represent small left effusion versus consolidation. 3. Right basilar atelectasis versus trace right effusion. Workstation performed: [id number]     XR chest portable    Result Date: 9/18/2023  Narrative: CHEST INDICATION:   Assess. COMPARISON:  None EXAM PERFORMED/VIEWS:  XR CHEST PORTABLE Images: 2 FINDINGS: Tracheostomy tube is seen in appropriate position. A feeding tube is seen extending down below the dome of the diaphragm Cardiomegaly seen Increased lung markings seen suggest congestion left base is obscured Osseous structures appear within normal limits for patient age. Impression: Increased lung markings seen suggest congestion. The left base is obscured No worsening Workstation performed: [id number]     CT soft tissue neck w contrast    Result Date: 9/14/2023  Narrative: CT NECK WITH CONTRAST INDICATION:   Laryngeal edema COMPARISON:  None. TECHNIQUE:  Axial, sagittal, and coronal 2D reformatted images were created from the axial source data and submitted for interpretation. Radiation dose length product (DLP) for this visit:  769 mGy-cm .   This examination, like all CT scans performed in the [facility], was performed utilizing techniques to minimize radiation dose exposure, including the use of iterative reconstruction and automated exposure control. IV Contrast:  85 mL of iohexol (OMNIPAQUE) IMAGE QUALITY:  Diagnostic. FINDINGS: VISUALIZED BRAIN PARENCHYMA:  No acute intracranial pathology of the visualized brain parenchyma. VISUALIZED ORBITS: Normal visualized orbits. PARANASAL SINUSES: Normal visualized paranasal sinuses. Nasopharynx, oropharynx AND HYPOPHARYNX: There is a large heterogeneously enhancing mass identified within the neck involving multiple spaces. The origin is difficult to ascertain given the large size. This mass measures approximately 6.1 x 4.7 x 5.2 cm and appears to be centered within the left oropharynx. The mass extends superiorly into the nasopharynx left more so than right and into the posterior aspect of the nasal cavity. The mass also extends across the midline within the oropharynx and inferiorly into the left lateral oropharynx but not below the laryngeal ventricle. The mass extends laterally into the infratemporal fossa and parapharyngeal fat and is inseparable from infratemporal musculature and deep lobe of the parotid gland. There is extension into the prevertebral space where the mass is inseparable from the anterior paraspinal muscle on the left and posteriorly and laterally into the carotid space where the mass is displacing and inseparable from jugular and carotid. The mass encases the cervical internal carotid artery just below the level of the skull base resulting in narrowing of the vessel and the mass compresses and occludes the jugular vein below the skull base. The vessel does reconstitute via collateral vessels as it  extends inferiorly within the neck. The mass extends superiorly into the skull base inseparable from the carotid canal and jugular foramen.  There is severe airway compromise within the posterior oral cavity and superior oropharynx. ET tube and OG tube are present. THYROID GLAND:  Unremarkable. PAROTID AND SUBMANDIBULAR GLANDS: Normal parotid and submandibular glands other than the mass abutting the deep lobe of the left parotid gland. LYMPH NODES: Multiple small jugular chain nodes are seen on the left. VASCULAR STRUCTURES: As described above the mass partially encases the cervical internal carotid artery, narrowing the vessel and occludes the jugular vein from the skull base to the mid neck. Below this the vessel is unremarkable due to collateral reconstitution. THORACIC INLET: Posterior left upper lobe consolidation may represent atelectasis or pneumonia. Mild patchy groundglass opacity within the upper lung fields. BONY STRUCTURES: At T1-2 there is a left paramedian bridging osteophyte resulting in mild to moderate left anterior lateral canal stenosis. Impression: Large enhancing soft tissue mass identified within the left neck involving multiple spaces. This appears to be centered within the left oropharynx with extensions as described above into multiple spaces. This results in significant airway compromise. 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 Small level 3 and level 4 nodes are seen within the neck. I personally discussed this study with ICU resident on 9/14/2023 4:44 PM. Workstation performed: [id number]     Bronchoscopy    Result Date: 9/14/2023  Narrative: Table formatting from the original result was not included. [address] [place] [id number] [phone] DATE OF SERVICE: 9/14/23 PHYSICIAN(S): Attending: No Staff Documented Fellow: No Staff Documented INDICATION: Acute respiratory failure with hypoxia ([address]) POST-OP DIAGNOSIS: See the impression below. PREPROCEDURE: Standard airway preparation completed per respiratory therapy protocol. Informed consent was obtained. Images reviewed prior to the procedure.   A Time Out was performed. No suspicion or identified risk for TB or other airborne infectious disease; bronchoscopy procedure being performed for diagnostic purposes. PROCEDURE: Bronchoscopy DETAILS OF PROCEDURE: Patient was taken to the procedure room where a time out was performed to confirm correct patient and correct procedure. The patient was already under sedation prior to the procedure. The patient's blood pressure, ECG, heart rate, level of consciousness, respirations and oxygen were monitored throughout the procedure. The patient experienced no blood loss. The scope was introduced through the endotracheal tube. The procedure was moderately difficult due to patient intolerance and persistent coughing. In response to procedure difficulty, deeper sedation was employed. The patient tolerated the procedure well. There were no apparent adverse events. ANESTHESIA INFORMATION: ASA: ASA status not filed in the log. Anesthesia Type: Anesthesia type not filed in the log.  FINDINGS: The lower trachea, main sujata, left main stem, KARTHIK, lingula, LLL, right main stem, RUL, bronchus intermedius, RML and RLL appeared normal. Left lower lung zone with no endobronchial lesions, left upper and lingula both appear normal Right upper, right middle and right lower lobes all appeared normal with no endobronchial lesions Endotracheal tube was retracted 3 cm under direct visualization with the bronchoscope Bronchoalveolar lavage was performed x1 in the right lateral segment (RB4) with 60 mL of saline instilled and a total return of 40 mL; the fluid appeared cloudy SPECIMENS: ID Type Source Tests Collected by Time Destination 1 :  Lavage Lung, Right Middle Lobe Bronchoalveolar Lavage PULMONARY CYTOLOGY Attila Bautista MD 9/14/2023 12:55 PM  A :  Bronchial Lung, Right Middle Lobe Bronchoalveolar Lavage FUNGAL CULTURE, VIRUS CULTURE, BRONCHIAL CULTURE AND GRAM STAIN, LEGIONELLA CULTURE, PNEUMOCYSTIS SMEAR BY DFA ([facility]), AFB CULTURE WITH STAIN Radha Adali Lubin MD 9/14/2023 12:57 PM       Impression: BAL of the right middle lobe Endotracheal tube was retracted under direct visualization Tongue still appears swollen, vocal cords visualized outside of the endotracheal tube with the bronchoscope, no significant edema or mass noted RECOMMENDATION:  Follow-up infectious work-up      XR chest 1 view portable    Result Date: 9/13/2023  Narrative: CHEST INDICATION:   post intubation. COMPARISON: Same day chest radiograph and subsequent same day CT chest. Chest x-ray 8/27/2023. EXAM PERFORMED/VIEWS:  XR CHEST PORTABLE FINDINGS: Endotracheal tube terminates approximately 4 cm above the sujata. Heart shadow is enlarged but unchanged from prior exam. Bibasilar airspace opacities are again demonstrated. No appreciable pneumothorax. No evidence of acute osseous abnormality. Lucent lesion within T12 is better demonstrated on same-day CT. Impression: 1. Endotracheal tube terminates 4 cm above the sujata. 2. Redemonstration of bibasilar airspace opacities. Workstation performed: [id number]     XR chest 1 view portable    Result Date: 9/13/2023  Narrative: CHEST INDICATION:   post intubation, adjusting ET Tube. COMPARISON:  None EXAM PERFORMED/VIEWS:  XR CHEST PORTABLE FINDINGS: Endotracheal tube terminates approximately 3 cm above the sujata. Cardiomediastinal silhouette appears unremarkable. Bibasilar airspace opacities are again noted. No appreciable pneumothorax or pleural effusion. No evidence of acute osseous abnormality. Lucent lesion within T12 is better demonstrated on same-day CT. Impression: 1. Endotracheal tube terminates 3.3 cm above the sujata. 2. Bibasilar airspace opacities are again demonstrated. Workstation performed: [id number]     XR chest portable    Result Date: 9/13/2023  Narrative: CHEST INDICATION:   sob. COMPARISON: 8/27/2023. Subsequent CT chest performed the same day.  EXAM PERFORMED/VIEWS:  XR CHEST PORTABLE FINDINGS: Heart shadow is enlarged but unchanged from prior exam. Hazy opacities are noted within the right lung base, possibly atelectasis versus pneumonia. Left basilar opacity is similar to prior radiograph. No appreciable pneumothorax. No evidence of acute osseous abnormality. Cystic lesion within the T12 vertebral body is better characterized on same-day CT. Impression: 1. Hazy bibasilar airspace opacities which may represent atelectasis versus pneumonia. Left basilar opacity is similar to recent radiograph while right basilar opacity is new Workstation performed: [id number]     CTA ED chest PE Study    Result Date: 9/13/2023  Narrative: CTA - CHEST WITH IV CONTRAST - PULMONARY ANGIOGRAM INDICATION:   R/O PE. Reports shortness of breath since being discharged from the hospital 3 weeks ago for pneumonia. Fatigue. Productive cough with white sputum. Angioedema. COMPARISON: Chest radiograph 9/13/2023, chest CT 8/25/2023, thoracic spine CT 11/13/2013. TECHNIQUE: CT angiogram timed for optimal opacification of the pulmonary arteries. Axial, sagittal, and coronal 2D reformats created from source data. Coronal 3D MIP postprocessing on the acquisition scanner. Radiation dose length product (DLP):  472 mGy-cm . Radiation dose exposure minimized using iterative reconstruction and automated exposure control. IV Contrast:  85 mL of iohexol (OMNIPAQUE) FINDINGS: PULMONARY ARTERIES:  No pulmonary embolus. Moderate pulmonary artery enlargement. LUNGS: Mild patchy new consolidation in the medial segment right middle lobe. Improving opacity in the left upper lobe with mild residual atelectasis/scar. Redemonstration of mild dependent atelectasis in both lower lobes. Severe emphysema. AIRWAYS: No significant filling defects. ET tube 4 cm above the sujata. PLEURA:  Unremarkable. HEART/GREAT VESSELS: Moderate cardiomegaly. MEDIASTINUM AND PRASANTH: Slight regression of hilar and mediastinal lymphadenopathy.  The largest node was previously 2.5 x 2.0 cm in the right infrahilar region and is now 1.9 x 1.3 cm (501/94). CHEST WALL AND LOWER NECK: Unremarkable. UPPER ABDOMEN: Right renal cysts. OSSEOUS STRUCTURES: Redemonstration of the large cystic lesion with sclerotic margins in T12 with destruction of the superior and inferior endplates, present as a much smaller thin-walled cystic lesion on the thoracic spine CT from 2013, imaged on a thoracic spine MRI from 2020. Impression: No pulmonary embolus. Patchy consolidation right middle lobe, new from 8/25/2023, compatible with pneumonia. Slight regression of previous hilar and mediastinal lymphadenopathy. Improving left upper lobe opacity which may have been due to pneumonia with residual atelectasis/scar. Persistent partial atelectasis of the lower lobes. 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 Workstation performed: [id number]     Echo complete w/ contrast if indicated    Result Date: 8/28/2023  Narrative: •  Left Ventricle: Left ventricular cavity size is small. Wall thickness is increased. There is severe concentric hypertrophy. The left ventricular ejection fraction is 60%. Systolic function is normal. Wall motion is normal. Diastolic function is mildly abnormal, consistent with grade I (abnormal) relaxation. There is no LV dynamic obstruction at rest. •  Right Ventricle: Right ventricular cavity size is normal. Systolic function is normal. •  Left Atrium: The atrium is mildly dilated. •  Mitral Valve: There is mild to moderate regurgitation. There is systolic anterior motion of the chordal apparatus with late peaking gradient 29 mmHg during Valsalva maneuver. •  Pericardium: There is a trivial pericardial effusion along the right atrial free wall. XR chest portable ICU    Result Date: 8/28/2023  Narrative: CHEST INDICATION:   Acute hypoxic respiratory failure.  COMPARISON: 8/25/2023 EXAM PERFORMED/VIEWS:  XR CHEST PORTABLE ICU FINDINGS: Heart shadow is enlarged but unchanged from prior exam. There is stable left-sided volume loss secondary to left basilar atelectasis. No pneumothorax or pleural effusion. Osseous structures appear within normal limits for patient age. Impression: Stable volume loss on the left secondary to left lower lobe atelectasis. Workstation performed: [id number]     VAS lower limb venous duplex study, complete bilateral    Result Date: 8/27/2023  Narrative:  [facility] REPORT CLINICAL: Indications: Patient presents with bilateral lower extremity pain x 1 days. Operative History: cardiac surgery-date unknown. Risk Factors The patient has history of HTN and CKD. She has no history of Hyperlipidemia. CONCLUSION:  Impression: RIGHT LOWER LIMB: No evidence of acute or chronic deep vein thrombosis. No evidence of superficial thrombophlebitis noted. Doppler evaluation shows a normal response to augmentation maneuvers. . Popliteal, posterior tibial and anterior tibial arterial Doppler waveform's are triphasic. LEFT LOWER LIMB: No evidence of acute or chronic deep vein thrombosis. No evidence of superficial thrombophlebitis noted. Doppler evaluation shows a normal response to augmentation maneuvers. Popliteal, posterior tibial and anterior tibial arterial Doppler waveform's are triphasic. SIGNATURE: Electronically Signed by: Daljit Lancaster on 2023-08-27 08:12:52 PM    CTA ED chest PE Study    Result Date: 8/25/2023  Narrative: CTA - CHEST WITH IV CONTRAST - PULMONARY ANGIOGRAM INDICATION:   Increased SOB, recent COVID diagnosis. COMPARISON: MRI from 3/18/2020 as well as bone scan from 7/2/2019 and thoracic spine from 11/13/2013 TECHNIQUE: CTA examination of the chest was performed using angiographic technique according to a protocol specifically tailored to evaluate for pulmonary embolism. Multiplanar 2D reformatted images were created from the source data.  In addition, coronal 3D MIP postprocessing was performed on the acquisition scanner. The study is limited by some respiratory motion artifacts especially in the lower lobes on the left where there is crowding of vessels due to atelectatic changes. Radiation dose length product (DLP) for this visit:  370 mGy-cm . This examination, like all CT scans performed in the [facility], was performed utilizing techniques to minimize radiation dose exposure, including the use of iterative reconstruction and automated exposure control. IV Contrast:  80 mL of iohexol (OMNIPAQUE) FINDINGS: PULMONARY ARTERIAL TREE: Limited visualization left lower lobe however there is suspicion for a small embolus in the posterobasal segment on axial image 139, series 305 and coronal image 142. No definite filling defect is seen elsewhere. The main pulmonary artery is significantly dilated at 4.2 cm. The RV LV ratio is elevated at 1.1 cm. The primary pulmonary vascular stent minutes are also dilated chronicity is uncertain. LUNGS: Emphysema is noted with blebs at the apices. There is peripheral consolidation in the left upper lobe. Air bronchogram is not well seen and there is mucous occlusion of the left mainstem bronchus with volume loss of the left upper lobe as well as  left lower lobe to a lesser extent. Some aeration in the left lower lobe is still visible. PLEURA:  Unremarkable. HEART/GREAT VESSELS:  Unremarkable for patient's age. No thoracic aortic aneurysm. MEDIASTINUM AND PRASANTH: 10 mm pretracheal node is identified. 9 mm superior mediastinal node is identified. 10 mm prevascular node is identified. Subcarinal node measures 1.5 cm. Hilar adenopathy is also demonstrated. Right hilar node is larger measuring 1.8 cm in short axis on image 138. CHEST WALL AND LOWER NECK:   Unremarkable. VISUALIZED STRUCTURES IN THE UPPER ABDOMEN: Low-density cyst is seen in the right kidney. . OSSEOUS STRUCTURES: There appears to be a destructive lesion involving the T12 vertebral body eroding both endplates and much of the vertebral body this does not appear to represent a Schmorl's node. A cystic lesion was noted in 2013 at this location however the current lesion is larger with loss of endplates. MRI also imaged this lesion in 2020 the lesion appears somewhat larger on the current examination however. Impression: Limited study. There is equivocal embolus in the posterior basal segment left lower lobe. Other smaller segments are difficult to assess due to motion and vascular crowding. There is mucous plugging in the left mainstem bronchus with volume loss in portions of the left lower lobe and left upper lobe. Aspiration pneumonia is not excluded. There is dilatation of the main pulmonary artery and proximal pulmonary segment suggesting pulmonary hypertension this is more likely chronic than acute given the degree of distention. Emphysema is present. There is significant mediastinal and hilar adenopathy suggesting underlying neoplasm more likely than simple reactive nodes. Enlarging lesion at T12 is again demonstrated of uncertain etiology. This has been present since 2013. Perhaps a plasmacytoma is a consideration. Neoplastic work-up is advised. Pulmonology consultation is also advised to assess endobronchial obstruction on the left. This was discussed with resident physician Dr. Dwight Mathis at 4:58 p.m. Follow-up was marked in the epic system Workstation performed: [id number]     XR chest 1 view portable    Result Date: 8/25/2023  Narrative: CHEST INDICATION:   SOB. COMPARISON: 8/21/2021 EXAM PERFORMED/VIEWS:  XR CHEST PORTABLE Single view FINDINGS: Development of CHF. Probable left basal infiltrate. Cardiomediastinal silhouette has become more enlarged. No pneumothorax or pleural effusion. Osseous structures appear within normal limits for patient age. Impression: Increased cardiomegaly. Development of CHF.  Probable left basal infiltrate Workstation performed: [id number] I have personally reviewed labs, imaging studies, and pertinent reports. This note has been generated by voice recognition software system. Therefore, there may be spelling, grammar, and or syntax errors. Please contact if questions arise.      Nathan Lepe, DO   Hematology and Medical Oncology - PGY [address]

## 2023-10-10 NOTE — ASSESSMENT & PLAN NOTE
Home regimen: Oxycodone 5 mg BID, Tylenol 650 mg q8 prn. Gabapentin 600 mg TID. Medical marijuana. Lumbar radiculopathy and impaired ambulatory dysfunction secondary to pain. Has bowel regimen and is having bowel movements daily. Plan:  Continue gabapentin 300 mg TID, oxycodone 5 mg TID, prn Tylenol 650 mg q6.     Oxycodone 2.5 mg prn for chemo cycle as patient required additional pain management during previous cycle

## 2023-10-10 NOTE — WOUND OSTOMY CARE
Progress Note - Wound   Irene Ley 77 y.o. female MRN: [id number]  Unit/Bed#: ICU 03 Encounter: [id number]      Assessment:   Patient admitted due to acute respiratory failure with hypoxia secondary to oropharyngeal mass. History of HLD, HTN, HFpEF, COPD, CKD. Wound care follow-up for pressure injury around trach site. Patient agreeable to assessment, alert and oriented x4, continent of bowel, amaro catheter in place for urine management, assist of 2 to turn for assessment, tracheostomy with ventilator assistance, tube feeds for nutrition, is an assist with care, is in ICU on a critical care low air loss mattress. Primary RN made aware of assessment. 1. Bilateral sacrum, buttock, hips, elbows, and heels- skin is dry, intact, blanchable. 2. Unstageable pressure injury under trach- Suspect etiology to be pressure and friction from trach plate. Wound is oval/irregular in shape, full-thickness, true depth unknown, approx. 10% pink tissue and 90% yellow adhered slough, with small amount of serosanguineous drainage noted. Tracy-wound is dry, intact, blanchable skin, pink scar tissue. Educated patient on importance of frequent offloading of pressure via turning, repositioning, and weight-shifting. No induration, fluctuance, odor, warmth, redness, or purulence noted to the above noted wound. New dressing applied. Patient tolerated well, reports mild discomfort to the wound. Primary nurse aware of plan of care. See flow sheets for more detailed assessment findings. Will follow along. Skin care plans:  1-Hydraguard to bilateral sacrum, buttock and heels BID and PRN  2-Elevate heels to offload pressure. 3-Ehob cushion in chair when out of bed.   4-Moisturize skin daily with skin nourishing cream.  5-Turn/reposition q2h or when medically stable for pressure re-distribution on skin.    6-Cleanse neck wound with normal saline, apply maxorb to wound, cover with split optifoam, change daily and PRN soilage/displacement. Wound 09/21/23 Pressure Injury Throat Medial (Active)   Wound Image   10/10/23 0912   Wound Description Yellow;Slough;Pink 10/10/23 0912   Pressure Injury Stage U 10/10/23 0912   Tracy-wound Assessment Dry; Intact 10/10/23 0912   Wound Length (cm) 1.2 cm 10/10/23 0912   Wound Width (cm) 2 cm 10/10/23 0912   Wound Depth (cm) 0.2 cm 10/10/23 0912   Wound Surface Area (cm^2) 2.4 cm^2 10/10/23 0912   Wound Volume (cm^3) 0.48 cm^3 10/10/23 0912   Calculated Wound Volume (cm^3) 0.48 cm^3 10/10/23 0912   Change in Wound Size % -140 10/10/23 0912   Tunneling 0 cm 10/10/23 0912   Tunneling in depth located at 0 10/10/23 0912   Undermining 0 10/10/23 0912   Undermining is depth extending from 0 10/10/23 0912        Drainage Amount Small 10/10/23 0912   Drainage Description Serosanguineous 10/10/23 0912   Non-staged Wound Description Full thickness 10/10/23 0912   Treatments Cleansed;Irrigation with NSS;Site care 10/10/23 0912   Dressing Calcium Alginate;Gauze 10/10/23 0912   Wound packed? No 10/10/23 0912   Packing- # removed 0 10/10/23 0912   Packing- # inserted 0 10/10/23 0912   Dressing Changed Changed 10/10/23 0912   Patient Tolerance Tolerated well 10/10/23 0912   Dressing Status Clean;Dry; Intact 10/10/23 0912     Call or tigertext with any questions  Wound Care will continue to follow    Warden Melgar BSN RN CWON  Wound and Ostomy care

## 2023-10-10 NOTE — PLAN OF CARE
Problem: MOBILITY - ADULT  Goal: Maintain or return to baseline ADL function  Description: INTERVENTIONS:  -  Assess patient's ability to carry out ADLs; assess patient's baseline for ADL function and identify physical deficits which impact ability to perform ADLs (bathing, care of mouth/teeth, toileting, grooming, dressing, etc.)  - Assess/evaluate cause of self-care deficits   - Assess range of motion  - Assess patient's mobility; develop plan if impaired  - Assess patient's need for assistive devices and provide as appropriate  - Encourage maximum independence but intervene and supervise when necessary  - Involve family in performance of ADLs  - Assess for home care needs following discharge   - Consider OT consult to assist with ADL evaluation and planning for discharge  - Provide patient education as appropriate  Outcome: Progressing  Goal: Maintains/Returns to pre admission functional level  Description: INTERVENTIONS:  - Perform BMAT or MOVE assessment daily.   - Set and communicate daily mobility goal to care team and patient/family/caregiver.    - Collaborate with rehabilitation services on mobility goals if consulted  - Record patient progress and toleration of activity level   Outcome: Progressing     Problem: Prexisting or High Potential for Compromised Skin Integrity  Goal: Skin integrity is maintained or improved  Description: INTERVENTIONS:  - Identify patients at risk for skin breakdown  - Assess and monitor skin integrity  - Assess and monitor nutrition and hydration status  - Monitor labs   - Assess for incontinence   - Turn and reposition patient  - Assist with mobility/ambulation  - Relieve pressure over bony prominences  - Avoid friction and shearing  - Provide appropriate hygiene as needed including keeping skin clean and dry  - Evaluate need for skin moisturizer/barrier cream  - Collaborate with interdisciplinary team   - Patient/family teaching  - Consider wound care consult   Outcome: Progressing

## 2023-10-10 NOTE — ASSESSMENT & PLAN NOTE
Patient reports abdominal pain which is unchanged since 9/22 no guarding on exam. Takes Famotidine for GERD at home. Alk phos 10/2 145, 10/14 156.  CTCAP reveals complex right upper pole renal lesion requiring possible outpatient MRI    - Continue Famotidine  - On bowel regimen with Senna and Miralax prn

## 2023-10-10 NOTE — ASSESSMENT & PLAN NOTE
Wt Readings from Last 3 Encounters:   10/14/23 81.8 kg (180 lb 5.4 oz)   09/07/23 74.6 kg (164 lb 6.4 oz)   08/30/23 73.3 kg (161 lb 9.6 oz)     Lab Results   Component Value Date    LVEF 60 08/28/2023     (H) 09/29/2023     (H) 09/13/2023     Echo 8/28/23: LVEF 60%.        Plan:  Goal Mg > 2 and K > 4  Measure I/O

## 2023-10-10 NOTE — PROGRESS NOTES
Critical Care Attending Note; Carole Covert   Note Date: 10/10/23  Note Time: 10:56 AM    /Patient: Burke Gonzalez  Age, : 77 y. o., 1955 MRN: [id number] Code Status: Level 1 - Full Code Patient Location: ICU 03/ICU [address] [place] [address] LOS:27 days  ]   Patient seen and examined, medical record reviewed, discussed with house staff and nursing staff. HPI   CC: BCell Lymphoma  67F with a PMH of HFpEF, COPD, CKD stage III, HTN, HLD admitted for pharengeal edema requiring intubation found to have diagnosis of B cell lymphoma.      Key Recent Events   : Admitted, Intubated  : Karolina Walden  : R-EPOCH cycle 1 day 1  : #7 Shiley XLT Cuffless  10/3: #7 Shiley XLT Cuff  10/6: Admitted MICU Hypoxia, Bronchoscopy     Main ICU Plans:       #Neuro   Chronic Pain/Bipolar  - Continue Home regimen     #Card  HTN  - Holding Norvasc, Lisinopril, Metoprolol     HFpEF -   - Diuresis - Lasix 40mg IV - Goal -2L    #Pulm  Tracheostomy - #7 Shiley XLT Cuff   - SBT - TCT if able, wean peep as tolerated  - Wean FiO2 - Maintain O2 Sat >90%  - Pulmonary toilet regimen - Hypertonic Saline, Chest PT   - Vap/Trach care  - ENT consulted appreciate recs  - PT/OT    COPD - Severe Emphysema  - Duonebs    #ID  Leukocytosis - Likely related to Gwenith Harmony    #Renal  PO - Cardiorenal - Improving with diuretics  - Renally dose medications  - Lasix     #GI  Dysphagia   - PEG    #Heme  B - Cell Lymphoma  - Oncology Consulted - R-EPOCH - Planned Cycle 2 10/10        #DVT/GI ppx  Lovenox    #Lines/Tubes/Drains:   Peripheral IV 23 Right;Ventral (anterior) Forearm (Active)   Number of days: 4       PICC Line // Right Basilic (Active)   Number of days: 4       NG/OG/Enteral Tube Nasogastric 16 Fr Right nare (Active)   Number of days: 3       #Nutrition:   Diet Enteral/Parenteral; Tube Feeding No Oral Diet; Jevity 1.5; Continuous; 50; Every 4 hours        #Code Status:   Level 1 - Full Code    #Dispo:   Estefany Jenkins MD  Pulmonary, Critical Care    Critical care time, excluding procedures, teaching, family meetings, and excludes any prior time recorded by the AP/resident, 35 minutes. Upon my evaluation, this patient has a high probability of imminent or life-threatening deterioration due to above problems which required my direct attention, intervention, and personal management. Impression/Active Problems:    B Cell lymphoma   Tracheostomy   Respiratory Failure   Secretions   HTN    TLS    Dysphagia   Chronic pain    PO   Cardiorenal syndrome   Leukocytosis    Physical Exam:     Vital Signs:   Weight: 81.3 kg (179 lb 3.7 oz)  IBW: Ideal body weight: 47.8 kg (105 lb 6.1 oz)  Adjusted ideal body weight: 61.2 kg (134 lb 14.7 oz)  Temp:  [99 °F (37.2 °C)-99.9 °F (37.7 °C)] 99.9 °F (37.7 °C)  HR:  [] 115  Resp:  [11-31] 14  BP: (105-116)/(60-72) 116/66  FiO2 (%):  [] 50  General: NAD  Neuro:  AxO 3  Heart: RRR  Lungs: Basilar crackles  Abdomen: Soft NT  Extremities: No edema                 Ventilator Settings:     Vent Mode: CPAP/PS Spont  FiO2 (%):  [] 50          Invalid input(s): "PCO2", "O2"  Radiologic Images Reviewed:   CXR  IMPRESSION:     Persistent pulmonary edema with small effusions.         Input / Output:       Intake/Output Summary (Last 24 hours) at 10/10/2023 1056  Last data filed at 10/10/2023 1043  Gross per 24 hour   Intake 1505 ml   Output 2375 ml   Net -870 ml            Infusions:     Scheduled Medications:  Current Facility-Administered Medications   Medication Dose Route Frequency Provider Last Rate   • acetaminophen  650 mg Oral Q6H PRN Maricarmen Nava MD     • busPIRone  30 mg Oral TID Maricarmen Nava MD     • chlorhexidine  15 mL Mouth/Throat Q12H [facility] Trent Spencer MD     • enoxaparin  30 mg Subcutaneous Q24H [facility] Trent Spencer MD     • famotidine  20 mg Oral BID Trent Spencer MD     • gabapentin  300 mg Oral TID Darlene Nickerson MD     • guaiFENesin  200 mg Oral Q6H Trent Spencer MD     • HYDROmorphone  0.2 mg Intravenous Q6H PRN Joanie Zee MD     • levalbuterol  1.25 mg Nebulization Q6H Trent Spencer MD      And   • ipratropium  0.5 mg Nebulization Q6H Trent Spencer MD     • levalbuterol  1.25 mg Nebulization Q8H PRN Darlene Nickerson MD     • nicotine  14 mg Transdermal Daily Keyla Ford MD      Followed by   • [START ON 10/16/2023] nicotine  7 mg Transdermal Daily Keyla Ford MD     • ondansetron  4 mg Intravenous Q8H PRN Darlene Nickerson MD     • oxyCODONE  2.5 mg Oral Q4H PRN Darlene Nickerson MD      Or   • oxyCODONE  5 mg Oral Q4H PRN Darlene Nickerson MD     • oxyCODONE  5 mg Oral Q8H Trent Spencer MD     • polyethylene glycol  17 g Oral Daily PRN Darlene Nickerson MD     • QUEtiapine  50 mg Oral HS RANDEE Ellis     • senna  8.8 mg Per NG Tube HS Trent Spencer MD     • sertraline  75 mg Per PEG Tube Daily RANDEE Ellis     • sodium chloride  4 mL Nebulization Q6H Trent Spencer MD         PRN Medications:  •  acetaminophen  •  HYDROmorphone  •  levalbuterol  •  ondansetron  •  oxyCODONE **OR** oxyCODONE  •  [COMPLETED] polyethylene glycol **FOLLOWED BY** polyethylene glycol    Labs Reviewed:  Results from last 7 days   Lab Units 10/10/23  0535 10/09/23  0536 10/08/23  0523   WBC Thousand/uL 15.42* 14.99* 13.06*   HEMOGLOBIN g/dL 7.8* 8.3* 8.3*   HEMATOCRIT % 26.1* 26.8* 26.4*   PLATELETS Thousands/uL 182 167 155      Results from last 7 days   Lab Units 10/10/23  0535 10/09/23  0536 10/08/23  1248 10/08/23  0523   SODIUM mmol/L 140 139 136 135   CO2 mmol/L 38* 39* 38* 37*   BUN mg/dL 51* 50* 51* 51*   CALCIUM mg/dL 9.5 9.7 9.5 9.2   MAGNESIUM mg/dL 2.1 2.1  --  2.3   PHOSPHORUS mg/dL 4.7* 4.3*  --  3.7         Invalid input(s): "ASTSGOT", "ALTSGPT"LABRCNTIP@ ,alkphos:3,tbilirubin:3,dbilirubin:3)@      Invalid input(s): "TROPT", "CPK", "PBNP"             I have personally seen and examined the patient on (10/10/23 between 5262-2038). I discussed the patient with the AP/resident including, but not limited to, verifying findings; reviewing labs and x-rays; discussing with consultants; developing the plan of care with the bedside nurse; and discussing treatment plan with patient or surrogate. I have reviewed the note and assessment performed by the AP/resident and agree with the AP/resident’s documented findings and plan of care with the above additions/exceptions. Please see my comments for details and adjustments.

## 2023-10-10 NOTE — ASSESSMENT & PLAN NOTE
Large B-cell lymphoma, stage II. First chemo cycle 9/22 4 days of R-EPOCH. 9/27 Rituxan. 9/28 Filgrastim. Acyclovir, Zyloprim, Diflucan, Levaquin, Zofran, Bactrim for chemo prophylaxis, all discontinued with heme-onc's approval as Nafisa reached 4200. I suspect leukocytosis is secondary to the filgrastim. Restarted all ppx 10/13 for [place] [facility] cycle 2 which will run for the next 4 days. Ppx and filgrastim can be discontinued when patient is no longer neutropenic after this cycle again.     Plan:  Keep Hgb>7 and Plt>20 for chemo  See acute respiratory failure above

## 2023-10-10 NOTE — ASSESSMENT & PLAN NOTE
Lab Results   Component Value Date    EGFR 59 10/14/2023    EGFR 60 10/13/2023    EGFR 67 10/12/2023    CREATININE 0.97 10/14/2023    CREATININE 0.95 10/13/2023    CREATININE 0.87 10/12/2023     Baseline Cr appears to be between 0.75-1.1. Patient received 2 doses of Bumex IV 2 g as she had not been responding appropriately to IV 40 Lasix daily. Creatinine went up by 0.5 meeting criteria for an PO. This may be prerenal intrinsic or postrenal.  Potential prerenal causes include reduced kidney perfusion due to lisinopril. Intrinsic can also be lisinopril due to ATN, AIN from chemo medications, TLS, crystaline nephropathy from acyclovir, rituxan nephrotoxicity, filgrastim glomerulonephritis. Post renal obstructive could be from urine retention from vincristine or cyclophosphamide bladder fibrosis. Suspect most likely due to medications as a combination of prerenal and intrinsic. FENa was 0.2%, UA shows no casts or signs of infection, urine eosinophils 0%. Patient likely has prerenal PO from volume depletion. Received 2 L NS and 1 pRBC and cr went up by 0.07 still and Na and Cl went down, suspect that volume prevented further cr rise and free water excretion impaired by kidney function, allowing hyponatremia to increase. Creatinine increase is likely plateaued and went down the following day. Suspect that now that nephrotoxic medications have been stopped she will continue to respond well to Lasix and creatinine will continue to downtrend. PO now resolved. Will be cautious about nephrotoxins and monitor as restarting EPOCH and ppx. Patient gets volume depleted and overloaded very easily.  Will likely be depleted from chemo     Cr at baseline

## 2023-10-10 NOTE — ASSESSMENT & PLAN NOTE
Right forearm soreness. BUE ultrasound in setting of high risk of DVT. RUE US: No evidence of acute or chronic deep vein thrombosis. 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  No longer requiring renal dosing as PO resolved    -Continue DVT ppx with Lovenox 40

## 2023-10-10 NOTE — CASE MANAGEMENT
Case Management Progress Note    Patient name Shantal Rapp  Location ICU 03/ICU 03 MRN [id number]  : 1955 Date 10/10/2023       LOS (days): 27  Geometric Mean LOS (GMLOS) (days): 26.10  Days to GMLOS:-0.9        OBJECTIVE:        Current admission status: Inpatient  Preferred Pharmacy:   [id number] [place] - [address]. [address] [place] [address]  Phone: [phone] Fax: [phone] [facility] [address]  [address] [address]  Phone: [phone] Fax: [phone]    Primary Care Provider: Rona Ramires MD    Primary Insurance: [employer]  Secondary Insurance: [address] [facility]    PROGRESS NOTE:    CM continues to follow for dcp. Next chemo treatment will be Friday 10/13. [facility] facilities continue to follow pt. Once pt is off Vent and stable on trach collar pt would be able to transition and continue OP for chemo treatment. CM spoke with Nancy at [facility]. CM reviewed pt current chemo plan, but also need for vent weaning. Yogi Chandler will review with her  to see if this would be an option. Pt would need to be readmitted for Chemo as pt's at [facility] can't get Chemo while admitted to their facility. CM received a return call from Yogi Chandler at [facility]. At this time due to medicare guidelines they would not be able to accept a pt under a short stay with a planned re-admission.

## 2023-10-10 NOTE — PROGRESS NOTES
[address] [facility]  Progress Note: Critical Care  Name: James Olmstead 77 y.o. female I MRN: [id number]  Unit/Bed#: ICU 03 I Date of Admission: 9/13/2023   Date of Service: 10/10/2023 I Hospital Day: 27    Assessment/Plan   * Acute respiratory failure with hypoxia secondary to oropharyngeal mass  Assessment & Plan  Cuffless trach placed 9/17, transitioned to cuffed on 10/3. Oxygen requirements not improving. For mental health patient is on buspirone, quetiapine, and sertraline. For pain, gabapentin, oxycodone scheduled and prn, and hydromorphone prn. On Guaifenesin, Atrovent and Xopenex for airway maintenance. No improvement in bilateral consolidation or atelectasis and groundglass opacities on repeat CT and chest x-rays. No change since bronchoscopy. 14/10/50% satting 93%. Patient was not receiving proper positive pressure to open up atelectatic lung. Net negative 2 L. Less crackles on exam. Bronchial cx with 3 colonies CoNS      Plan:  • Attempt to wean O2 requirements-maintain cuff overnight from 9 pm to 6 am for positive pressure, can open cuff during the day for patient to speak  • PCP PCR, viral culture from bronchoscopy all pending  • Continue Lasix 40 IV BID    Large cell lymphoma ([address])  Assessment & Plan  Biopsy on 9/17: Large B-cell lymphoma, stage II. First inpatient chemotherapy 9/22/2023- with R-EPOCH. 9/27 Rituxan. 9/28 Filgrastim. acyclovir, Zyloprim, Diflucan, Levaquin, Zofran, Bactrim for chemo prophylaxis, all discontinued as Nafisa reached 4200 yesterday with heme-onc's approval. Todays CBC WBC up 15.42. I suspect leukocytosis is secondary to the filgrastim.     Plan:  · Inpatient and outpatient hematology oncology following, next inpatient [facility] cycle starts 10/13  · Repeat BCx if patient spikes another fever    (HFpEF) heart failure with preserved ejection fraction [facility])  Assessment & Plan  Wt Readings from Last 3 Encounters:   10/10/23 81.3 kg (179 lb 3.7 oz)   09/07/23 74.6 No chief complaint on file. Naheed Willams verbally consents to a [employer] on 05/20/20. Patient understands and accepts financial responsibility for any deductible, co-insurance and/or co-pays associated with this service.   Contact with kg (164 lb 6.4 oz)   08/30/23 73.3 kg (161 lb 9.6 oz)     Lab Results   Component Value Date    LVEF 60 08/28/2023     (H) 09/29/2023     (H) 09/13/2023     Echo 8/28/23: LVEF 60%. CXR 9/22: Persistent pulmonary venous congestion with small effusions and bibasilar atelectasis. Patient has not responded well to Lasix 40 or Bumex 2. Baseline creatinine around 0.8, PO resolving Cr down to 1.36 today.      Plan:  • BMP, magnesium; Goal Mg > 2 and K > 4; Replete prn  • Measure I/O        Depression  Assessment & Plan  Patient on Zoloft 75 daily and Seroquel evening. Patient is depressed, met with palliative 10/5 for goals of care. Patient was firm that she wants to live and to fight, she is just tired. Generalized abdominal pain  Assessment & Plan  Patient reports abdominal pain which is unchanged since 9/22 no guarding on exam. Takes Famotidine for GERD at home. Alk phos 10/2 145, today 201.     - Continue Famotidine  - On bowel regimen with Senna and Miralax prn    Chronic Superficial thrombophlebitis  Assessment & Plan  Right forearm soreness. BUE ultrasound in setting of high risk of DVT. RUE US[de-identified] No evidence of acute or chronic deep vein thrombosis. 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 Lovenox 40 no longer appropriate with PO. As PO is trending down we can consider going back up to Lovenox 40, will reevaluate after creatinine returns to baseline    -Continue DVT ppx with Lovenox 30 renal dosing    Blister (nonthermal) of left forearm, sequela  Assessment & Plan  Blisters of LUE healing well, no signs of infection. Continue daily wound care    Oropharyngeal mass  Assessment & Plan  9/14 Neck/ soft tissue CT: Large enhancing soft tissue mass identified within the left neck involving multiple spaces. Centered within the left oropharynx with extensions as described above into multiple spaces. This results in significant airway compromise.  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 Small level 3/level 4 nodes are seen within the neck. Tracheostomy by ENT, bx & addition testing performed. Replaced on 9/26. H/o tobacco abuse, daily smoker. On nicotine while inpatient, confirmed with patient she needs nicotine patch    Plan:  · ENT and heme onc following  · See acute respiratory failure and large B cell lymphoma above      Angioedema  Assessment & Plan  POA in [place] ([place] ED: Swollen tongue, swelling soft and hard palate and almost the head of all phalanx is completely closed. Severe angioedema. Decadron 10 mg, Benadryl 25 mg given. Initially refused but eventually agreed with intubation. 2/2 oropharyngeal mass compression. Plan:  • Cuffless trach placed 9/17, transitioned to cuffed 10/3  • Tolerating trach without issue  • See acute respiratory failure with hypoxia secondary to oropharyngeal mass above    Ambulatory dysfunction  Assessment & Plan  Impacted by chronic pain syndrome, but medication regimen may also contribute. Patient currently requires tracheostomy with ICU level care and will be inpatient for [facility] cycle 2 starting this week. Will require PT/ OT eval before discharge. CKD (chronic kidney disease) stage 3, GFR 30-59 ml/min [facility])  Assessment & Plan  Lab Results   Component Value Date    EGFR 39 10/10/2023    EGFR 35 10/09/2023    EGFR 29 10/08/2023    CREATININE 1.36 (H) 10/10/2023    CREATININE 1.50 (H) 10/09/2023    CREATININE 1.72 (H) 10/08/2023     Baseline Cr appears to be between 0.75-1.1. Patient received 2 doses of Bumex IV 2 g as she had not been responding appropriately to IV 40 Lasix daily. Creatinine went up by 0.5 meeting criteria for an PO. This may be prerenal intrinsic or postrenal.  Potential prerenal causes include reduced kidney perfusion due to lisinopril. Intrinsic can also be lisinopril due to ATN, AIN from chemo medications, TLS, crystaline nephropathy from acyclovir, rituxan nephrotoxicity, filgrastim glomerulonephritis.  Post renal obstructive could be from urine retention from vincristine, cyclophosphamide bladder fibrosis. Suspect most likely due to medications as a combination of prerenal and intrinsic. Cr up to 2 today. FENa was 0.2%, UA shows no casts or signs of infection, urine eosinophils 0%. Patient likely has prerenal PO from volume depletion. Received 2 L NS and 1 pRBC and cr went up by 0.07 still and Na and Cl went down, suspect that volume prevented further cr rise and free water excretion impaired by kidney function, allowing hyponatremia to increase. Creatinine increase is likely plateaued. Went down today. Patient did receive 20 of Lasix overnight for volume overload which she responded well to. Suspect that now that nephrotoxic medications have been stopped she will continue to respond well to Lasix and creatinine will continue to downtrend    Plan  · Continue to monitor    Pain syndrome, chronic  Assessment & Plan  Home regimen: Oxycodone 5 mg twice daily prn. Tylenol 650 mg every 8 hours prn. Gabapentin 600 mg 3 times daily. Medical marijuana. Lumbar radiculopathy. Impaired ambulatory dysfunction second to pain. Patient is taking opioids regularly for pain, has bowel regimen and is having bowel movements daily. Plan:  · Continue gabapentin 300 mg 3 times daily, oxycodone 5 mg 3 times daily, prn Tylenol 650 mg every 6 hours. Benign essential hypertension  Assessment & Plan  Takes Coreg 12.5 mg BID, Amlodipine 10 mg daily, and lisinopril 40 mg daily all discontinued at this time secondary to hypotension and PO. If blood pressure is elevated, restart Coreg only, but stable currently without any antihypertensives    Plan:  · Monitor vitals          ICU Core Measures     A: Assess, Prevent, and Manage Pain · Has pain been assessed? Yes  · Need for changes to pain regimen?  No   B: Both SAT/SAT  · N/A   C: Choice of Sedation · RASS Goal: 0 Alert and Calm or N/A patient not on sedation  · Need for changes to sedation or analgesia regimen? No   D: Delirium · CAM-ICU: Negative   E: Early Mobility  · Plan for early mobility? No   F: Family Engagement · Plan for family engagement today? No       Antibiotic Review: Off abx, will likely restart during cycle 2    Review of Invasive Devices:            Prophylaxis:  VTE VTE covered by:  enoxaparin, Subcutaneous, 30 mg at 10/09/23 0825       Stress Ulcer  covered byfamotidine (PEPCID) oral suspension 40 mg [[id number]], pantoprazole (PROTONIX) injection 40 mg [[id number]]          Subjective   HPI/24hr events:   No overnight events. Patient feels good this morning. [place] was her 68th birthday and she slept well. No changes in her pain. Review of Systems   Constitutional: Negative for fever. Respiratory: Negative for cough, shortness of breath and wheezing. Cardiovascular: Negative for chest pain, palpitations and leg swelling. Objective                            Vitals I/O      Most Recent Min/Max in 24hrs   Temp 99.9 °F (37.7 °C) Temp  Min: 99 °F (37.2 °C)  Max: 99.9 °F (37.7 °C)   Pulse (!) 115 Pulse  Min: 92  Max: 120   Resp (!) 0 Resp  Min: 0  Max: 31   /66 BP  Min: 105/60  Max: 116/66   O2 Sat (!) 88 % SpO2  Min: 88 %  Max: 100 %      Intake/Output Summary (Last 24 hours) at 10/10/2023 0729  Last data filed at 10/10/2023 0609  Gross per 24 hour   Intake 1365 ml   Output 2075 ml   Net -710 ml         Diet Enteral/Parenteral; Tube Feeding No Oral Diet; Jevity 1.5; Continuous; 50; Every 4 hours     Invasive Monitoring Physical exam    Physical Exam  Constitutional:       General: She is not in acute distress. HENT:      Head: Normocephalic and atraumatic. Cardiovascular:      Rate and Rhythm: Normal rate and regular rhythm. Heart sounds: Normal heart sounds. No murmur heard. Pulmonary:      Effort: Pulmonary effort is normal. No respiratory distress. Comments: Decreasing bilateral crackles  Abdominal:      General: There is no distension. Tenderness: There is abdominal tenderness. There is no guarding or rebound. Comments: Abdominal pain unchanged   Musculoskeletal:      Right lower leg: No edema. Left lower leg: No edema. Skin:     Coloration: Skin is not jaundiced or pale. Neurological:      Mental Status: She is alert and oriented to person, place, and time.            Diagnostic Studies      No new imaging to review at this time     Medications:  Scheduled PRN   busPIRone, 30 mg, TID  chlorhexidine, 15 mL, Q12H ASHLEY  enoxaparin, 30 mg, Q24H ASHLEY  famotidine, 20 mg, BID  gabapentin, 300 mg, TID  guaiFENesin, 200 mg, Q6H  levalbuterol, 1.25 mg, Q6H   And  ipratropium, 0.5 mg, Q6H  nicotine, 14 mg, Daily   Followed by  Gelacio Mosley ON 10/16/2023] nicotine, 7 mg, Daily  oxyCODONE, 5 mg, Q8H  QUEtiapine, 50 mg, HS  senna, 8.8 mg, HS  sertraline, 75 mg, Daily  sodium chloride, 4 mL, Q6H      acetaminophen, 650 mg, Q6H PRN  HYDROmorphone, 0.2 mg, Q6H PRN  levalbuterol, 1.25 mg, Q8H PRN  ondansetron, 4 mg, Q8H PRN  oxyCODONE, 2.5 mg, Q4H PRN   Or  oxyCODONE, 5 mg, Q4H PRN  polyethylene glycol, 17 g, Daily PRN       Continuous          Labs:    CBC    Recent Labs     10/09/23  0536 10/10/23  0535   WBC 14.99* 15.42*   HGB 8.3* 7.8*   HCT 26.8* 26.1*    182   BANDSPCT 13*  --      BMP    Recent Labs     10/09/23  0536 10/10/23  0535   SODIUM 139 140   K 5.2 5.1   CL 95* 95*   CO2 39* 38*   AGAP 5 7   BUN 50* 51*   CREATININE 1.50* 1.36*   CALCIUM 9.7 9.5       Coags    No recent results     Additional Electrolytes  Recent Labs     10/09/23  0536 10/10/23  0535   MG 2.1 2.1   PHOS 4.3* 4.7*   CAIONIZED  --  1.15          Blood Gas    No recent results  No recent results LFTs  Recent Labs     10/09/23  0536 10/10/23  0535   ALT 26 24   AST 21 24   ALKPHOS 235* 201*   ALB 2.6* 2.7*   TBILI 0.36 0.39       Infectious  Recent Labs     10/09/23  0536   PROCALCITONI 0.36*     Glucose  Recent Labs     10/08/23  1248 10/09/23  0536 10/10/23  0535 GLUC 119 140 138                   Maureen Thornton MD

## 2023-10-10 NOTE — ASSESSMENT & PLAN NOTE
Home regimen Coreg 12.5 mg BID, Amlodipine 10 mg daily, and lisinopril 40 mg. All discontinued at this time secondary to hypotension and PO since cycle 1. but stable currently without any antihypertensives. Systolic persistently elevated and tachycardic, potentially secondary to pain. Patient was more hypotensive during last chemo. Coreg contraindicated during chemo. If needed can give prn hydralazine or restart norvasc    Patient with BP > 170s in past 12 hours    Plan:  Monitor vitals.  If continues to have elevated BP post chemo, start amlodipine

## 2023-10-10 NOTE — ASSESSMENT & PLAN NOTE
Cuffless trach placed 9/17, transitioned to cuffed on 10/3. Oxygen requirements not improving. For mental health patient is on buspirone, quetiapine, and sertraline. For pain, gabapentin, oxycodone scheduled and prn, prn oxycodone mainly utilized for increased pain during chemo and after a bronch. On Guaifenesin, Atrovent and Xopenex for airway maintenance. No improvement in bilateral consolidation or atelectasis and groundglass opacities on repeat CT and chest x-rays. Patient not receiving proper positive pressure to open up atelectatic lung. Bronchial cx with 3 colonies CoNS. PCP PCR invalid, repeat negative. CTCAP indicates additional groundglass opacity with paraseptal emphysema, which may be contributing to inability to remain off vent.  SBTs have been unsuccessful to place patient on trach collar since return to ICU     Fungitell normal     Plan:  Maintain cuff overnight from 9 pm to 6 am for positive pressure, can open cuff during the day for patient to speak, consider bipap  Repeat Bcx if febrile  Consider repeat bronch for DAH as Hgb dropping again

## 2023-10-10 NOTE — ASSESSMENT & PLAN NOTE
Impacted by chronic pain syndrome, but medication regimen may also contribute. Patient currently requires tracheostomy with ICU level care and will be inpatient for R-EPOCH cycle 2 start 10/13. Will require PT/ OT eval before discharge.  Need LTACH placement via CM

## 2023-10-10 NOTE — ASSESSMENT & PLAN NOTE
Patient on Zoloft 75 daily and Seroquel evening. Patient is depressed, family meeting with palliative on 10/5 for goals of care. Patient is firm that she wants to live and to fight, she is just tired.      Consult Pastoral care

## 2023-10-10 NOTE — ASSESSMENT & PLAN NOTE
9/14 Neck/ soft tissue CT: Large enhancing soft tissue mass identified within the left neck involving multiple spaces. Centered within the left oropharynx with extensions as described above into multiple spaces. This results in significant airway compromise. 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 Small level 3/level 4 nodes are seen within the neck. Tracheostomy by ENT, bx & addition testing performed. Replaced on 9/26. H/o tobacco abuse, daily smoker. On nicotine while inpatient, confirmed with patient she needs nicotine patch.  CT soft tissue neck shows reduced mass effect from 9/14 to 10/13    Plan:  ENT and heme onc following  Will titrate NRT weekly  See acute respiratory failure and large B cell lymphoma above

## 2023-10-10 NOTE — ASSESSMENT & PLAN NOTE
POA in [place] ([place]) ED: Swollen tongue, soft and hard palate with severe angioedema. Decadron 10 mg, Benadryl 25 mg given. Initially refused but eventually agreed to intubation. 2/2 oropharyngeal mass compression.     Plan:  Cuffless trach placed 9/17, transitioned to cuffed 10/3  See acute respiratory failure with hypoxia and oropharyngeal mass above

## 2023-10-11 ENCOUNTER — APPOINTMENT (INPATIENT)
Dept: RADIOLOGY | Facility: HOSPITAL | Age: 68
DRG: 004 | End: 2023-10-11
Payer: COMMERCIAL

## 2023-10-11 LAB
ANION GAP SERPL CALCULATED.3IONS-SCNC: 8 MMOL/L
BUN SERPL-MCNC: 38 MG/DL (ref 5–25)
CALCIUM SERPL-MCNC: 9.6 MG/DL (ref 8.4–10.2)
CHLORIDE SERPL-SCNC: 97 MMOL/L (ref 96–108)
CO2 SERPL-SCNC: 36 MMOL/L (ref 21–32)
CREAT SERPL-MCNC: 1.04 MG/DL (ref 0.6–1.3)
ERYTHROCYTE [DISTWIDTH] IN BLOOD BY AUTOMATED COUNT: 16.4 % (ref 11.6–15.1)
GFR SERPL CREATININE-BSD FRML MDRD: 54 ML/MIN/1.73SQ M
GLUCOSE SERPL-MCNC: 118 MG/DL (ref 65–140)
HCT VFR BLD AUTO: 28.3 % (ref 34.8–46.1)
HGB BLD-MCNC: 8.7 G/DL (ref 11.5–15.4)
MAGNESIUM SERPL-MCNC: 2 MG/DL (ref 1.9–2.7)
MCH RBC QN AUTO: 31.1 PG (ref 26.8–34.3)
MCHC RBC AUTO-ENTMCNC: 30.7 G/DL (ref 31.4–37.4)
MCV RBC AUTO: 101 FL (ref 82–98)
PHOSPHATE SERPL-MCNC: 4.7 MG/DL (ref 2.3–4.1)
PLATELET # BLD AUTO: 239 THOUSANDS/UL (ref 149–390)
PMV BLD AUTO: 9.7 FL (ref 8.9–12.7)
POTASSIUM SERPL-SCNC: 4.3 MMOL/L (ref 3.5–5.3)
RBC # BLD AUTO: 2.8 MILLION/UL (ref 3.81–5.12)
SODIUM SERPL-SCNC: 141 MMOL/L (ref 135–147)
WBC # BLD AUTO: 18.6 THOUSAND/UL (ref 4.31–10.16)

## 2023-10-11 PROCEDURE — NC001 PR NO CHARGE: Performed by: STUDENT IN AN ORGANIZED HEALTH CARE EDUCATION/TRAINING PROGRAM

## 2023-10-11 PROCEDURE — 94760 N-INVAS EAR/PLS OXIMETRY 1: CPT

## 2023-10-11 PROCEDURE — 94640 AIRWAY INHALATION TREATMENT: CPT

## 2023-10-11 PROCEDURE — 83735 ASSAY OF MAGNESIUM: CPT

## 2023-10-11 PROCEDURE — 99232 SBSQ HOSP IP/OBS MODERATE 35: CPT | Performed by: STUDENT IN AN ORGANIZED HEALTH CARE EDUCATION/TRAINING PROGRAM

## 2023-10-11 PROCEDURE — 84100 ASSAY OF PHOSPHORUS: CPT

## 2023-10-11 PROCEDURE — 71045 X-RAY EXAM CHEST 1 VIEW: CPT

## 2023-10-11 PROCEDURE — 80048 BASIC METABOLIC PNL TOTAL CA: CPT

## 2023-10-11 PROCEDURE — 93005 ELECTROCARDIOGRAM TRACING: CPT

## 2023-10-11 PROCEDURE — 85027 COMPLETE CBC AUTOMATED: CPT

## 2023-10-11 PROCEDURE — 94150 VITAL CAPACITY TEST: CPT

## 2023-10-11 RX ORDER — LEVALBUTEROL INHALATION SOLUTION 1.25 MG/3ML
1.25 SOLUTION RESPIRATORY (INHALATION)
Status: DISCONTINUED | OUTPATIENT
Start: 2023-10-11 | End: 2023-10-11 | Stop reason: SDUPTHER

## 2023-10-11 RX ORDER — LEVALBUTEROL INHALATION SOLUTION 1.25 MG/3ML
1.25 SOLUTION RESPIRATORY (INHALATION)
Status: DISCONTINUED | OUTPATIENT
Start: 2023-10-11 | End: 2023-10-31

## 2023-10-11 RX ADMIN — IPRATROPIUM BROMIDE 0.5 MG: 0.5 SOLUTION RESPIRATORY (INHALATION) at 07:21

## 2023-10-11 RX ADMIN — FAMOTIDINE 20 MG: 20 TABLET, FILM COATED ORAL at 08:35

## 2023-10-11 RX ADMIN — LEVALBUTEROL HYDROCHLORIDE 1.25 MG: 1.25 SOLUTION RESPIRATORY (INHALATION) at 21:23

## 2023-10-11 RX ADMIN — BUSPIRONE HYDROCHLORIDE 30 MG: 10 TABLET ORAL at 22:37

## 2023-10-11 RX ADMIN — IPRATROPIUM BROMIDE 0.5 MG: 0.5 SOLUTION RESPIRATORY (INHALATION) at 02:51

## 2023-10-11 RX ADMIN — IPRATROPIUM BROMIDE 0.5 MG: 0.5 SOLUTION RESPIRATORY (INHALATION) at 13:20

## 2023-10-11 RX ADMIN — LEVALBUTEROL HYDROCHLORIDE 1.25 MG: 1.25 SOLUTION RESPIRATORY (INHALATION) at 02:51

## 2023-10-11 RX ADMIN — SODIUM CHLORIDE SOLN NEBU 3% 4 ML: 3 NEBU SOLN at 02:51

## 2023-10-11 RX ADMIN — LEVALBUTEROL HYDROCHLORIDE 1.25 MG: 1.25 SOLUTION RESPIRATORY (INHALATION) at 13:20

## 2023-10-11 RX ADMIN — GABAPENTIN 300 MG: 300 CAPSULE ORAL at 21:56

## 2023-10-11 RX ADMIN — ONDANSETRON 4 MG: 2 INJECTION INTRAMUSCULAR; INTRAVENOUS at 12:20

## 2023-10-11 RX ADMIN — BUSPIRONE HYDROCHLORIDE 30 MG: 10 TABLET ORAL at 08:35

## 2023-10-11 RX ADMIN — SERTRALINE 75 MG: 25 TABLET, FILM COATED ORAL at 08:35

## 2023-10-11 RX ADMIN — GUAIFENESIN 200 MG: 200 SOLUTION ORAL at 14:21

## 2023-10-11 RX ADMIN — Medication 8.8 MG: at 22:37

## 2023-10-11 RX ADMIN — FAMOTIDINE 20 MG: 20 TABLET, FILM COATED ORAL at 17:22

## 2023-10-11 RX ADMIN — GUAIFENESIN 200 MG: 200 SOLUTION ORAL at 08:34

## 2023-10-11 RX ADMIN — IPRATROPIUM BROMIDE 0.5 MG: 0.5 SOLUTION RESPIRATORY (INHALATION) at 21:23

## 2023-10-11 RX ADMIN — BUSPIRONE HYDROCHLORIDE 30 MG: 10 TABLET ORAL at 17:22

## 2023-10-11 RX ADMIN — ACETAMINOPHEN 650 MG: 325 TABLET, FILM COATED ORAL at 21:56

## 2023-10-11 RX ADMIN — ENOXAPARIN SODIUM 40 MG: 40 INJECTION SUBCUTANEOUS at 08:34

## 2023-10-11 RX ADMIN — SODIUM CHLORIDE SOLN NEBU 3% 4 ML: 3 NEBU SOLN at 07:21

## 2023-10-11 RX ADMIN — GABAPENTIN 300 MG: 300 CAPSULE ORAL at 17:21

## 2023-10-11 RX ADMIN — CHLORHEXIDINE GLUCONATE 15 ML: 1.2 SOLUTION ORAL at 21:56

## 2023-10-11 RX ADMIN — QUETIAPINE FUMARATE 50 MG: 25 TABLET ORAL at 21:55

## 2023-10-11 RX ADMIN — CHLORHEXIDINE GLUCONATE 15 ML: 1.2 SOLUTION ORAL at 08:34

## 2023-10-11 RX ADMIN — NICOTINE 14 MG: 14 PATCH, EXTENDED RELEASE TRANSDERMAL at 08:35

## 2023-10-11 RX ADMIN — OXYCODONE HYDROCHLORIDE 5 MG: 5 SOLUTION ORAL at 06:07

## 2023-10-11 RX ADMIN — OXYCODONE HYDROCHLORIDE 5 MG: 5 SOLUTION ORAL at 14:21

## 2023-10-11 RX ADMIN — LEVALBUTEROL HYDROCHLORIDE 1.25 MG: 1.25 SOLUTION RESPIRATORY (INHALATION) at 07:20

## 2023-10-11 RX ADMIN — GABAPENTIN 300 MG: 300 CAPSULE ORAL at 08:35

## 2023-10-11 RX ADMIN — GUAIFENESIN 200 MG: 200 SOLUTION ORAL at 21:56

## 2023-10-11 NOTE — RESPIRATORY THERAPY NOTE
Pt suctioned prior to  SBT attempt pt placed on 100%Tc pt immediatley desaturated to 77% , tried Passey valve for added PEEP pt remained 82% . Placed on t piece pt remaind under 85%, pt placed on HF @ 50l and 85%, sats increased to 93%.  Pt appears comfortable and denies SOB

## 2023-10-11 NOTE — NUTRITION
10/11/23 1054   Recommendations/Interventions   Recommendations to Provider Noted phosphorus trending up. Recommend adjusting TF formula to TwoCal HN @ 38ml/hr. Adjust water flushes to 190ml q 4 hrs. Will provide 1824kcal, 76gPRO, 1778ml free water, 958mg Phosphorus (current EN providing 1500mg P). Continue to monitor weight and electrolytes.

## 2023-10-11 NOTE — PROGRESS NOTES
[id number]  Progress Note: Critical Care  Name: Keven Dwyer 77 y.o. female I MRN: [id number]  Unit/Bed#: ICU 03 I Date of Admission: 9/13/2023   Date of Service: 10/11/2023 I Hospital Day: 29    Assessment/Plan   * Acute respiratory failure with hypoxia secondary to oropharyngeal mass  Assessment & Plan  Cuffless trach placed 9/17, transitioned to cuffed on 10/3. Oxygen requirements not improving. For mental health patient is on buspirone, quetiapine, and sertraline. For pain, gabapentin, oxycodone scheduled and prn, and hydromorphone prn. On Guaifenesin, Atrovent and Xopenex for airway maintenance. No improvement in bilateral consolidation or atelectasis and groundglass opacities on repeat CT and chest x-rays. No change since bronchoscopy. Patient was not receiving proper positive pressure to open up atelectatic lung. Less crackles on exam. Bronchial cx with 3 colonies CoNS. Low grade fevers and persistent tachycardia. Concern for infection vs PE     Plan:  Attempt to wean O2 requirements-maintain cuff overnight from 9 pm to 6 am for positive pressure, can open cuff during the day for patient to speak  PCP PCR, viral culture from bronchoscopy pending  Obtain EKG and consider CTPE  Repeat BCx      Large cell lymphoma ([address])  Assessment & Plan  Biopsy on 9/17: Large B-cell lymphoma, stage II. First inpatient chemotherapy 9/22/2023- with R-EPOCH. 9/27 Rituxan. 9/28 Filgrastim. acyclovir, Zyloprim, Diflucan, Levaquin, Zofran, Bactrim for chemo prophylaxis, all discontinued with heme-onc's approval as Nafisa reached 4200. Todays CBC WBC up  18.6.   I suspect leukocytosis is secondary to the filgrastim, but given low grade fevers infection is also a possibility    Plan:  Inpatient and outpatient hematology oncology following, next inpatient [facility] cycle starts 10/13  See acute respiratory failure above    (HFpEF) heart failure with preserved ejection fraction [facility])  Assessment & Plan  Wt Readings from Last 3 Encounters:   10/11/23 81.3 kg (179 lb 3.7 oz)   09/07/23 74.6 kg (164 lb 6.4 oz)   08/30/23 73.3 kg (161 lb 9.6 oz)     Lab Results   Component Value Date    LVEF 60 08/28/2023     (H) 09/29/2023     (H) 09/13/2023     Echo 8/28/23: LVEF 60%. CXR 9/22: Persistent pulmonary venous congestion with small effusions and bibasilar atelectasis. Patient has not responded well to Lasix 40 or Bumex 2. Baseline creatinine around 0.8, PO resolving Cr down to 1.04 today. Plan:  BMP, magnesium; Goal Mg > 2 and K > 4; Replete prn  Measure I/O        Depression  Assessment & Plan  Patient on Zoloft 75 daily and Seroquel evening. Patient is depressed, family met with palliative 10/5 for goals of care. Patient was firm that she wants to live and to fight, she is just tired. Generalized abdominal pain  Assessment & Plan  Patient reports abdominal pain which is unchanged since 9/22 no guarding on exam. Takes Famotidine for GERD at home. Alk phos 10/2 145, 10/10 201.     - Continue Famotidine  - On bowel regimen with Senna and Miralax prn    Chronic Superficial thrombophlebitis  Assessment & Plan  Right forearm soreness. BUE ultrasound in setting of high risk of DVT. RUE US[de-identified] No evidence of acute or chronic deep vein thrombosis. 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 Back on Lovenox 40, no longer requires renal dosing as PO resolved    -Continue DVT ppx with Lovenox 40    Blister (nonthermal) of left forearm, sequela  Assessment & Plan  Blisters of LUE healing well, no signs of infx. Continue daily wound care    Oropharyngeal mass  Assessment & Plan  9/14 Neck/ soft tissue CT: Large enhancing soft tissue mass identified within the left neck involving multiple spaces. Centered within the left oropharynx with extensions as described above into multiple spaces. This results in significant airway compromise. 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  Small level 3/level 4 nodes are seen within the neck. Tracheostomy by ENT, bx & addition testing performed. Replaced on 9/26. H/o tobacco abuse, daily smoker. On nicotine while inpatient, confirmed with patient she needs nicotine patch    Plan:  ENT and heme onc following  Will titrate NRT  See acute respiratory failure and large B cell lymphoma above      Angioedema  Assessment & Plan  PO[facility] in [facility] ED: Swollen tongue, swelling soft and hard palate and almost the head of all phalanx is completely closed. Severe angioedema. Decadron 10 mg, Benadryl 25 mg given. Initially refused but eventually agreed with intubation. 2/2 oropharyngeal mass compression. Plan:  Cuffless trach placed 9/17, transitioned to cuffed 10/3  Tolerating trach without issue  See acute respiratory failure with hypoxia 2/2 oropharyngeal mass above    Ambulatory dysfunction  Assessment & Plan  Impacted by chronic pain syndrome, but medication regimen may also contribute. Patient currently requires tracheostomy with ICU level care and will be inpatient for R-EPOCH cycle 2 starting this week on 10/13. Will require PT/ OT eval before discharge. CKD (chronic kidney disease) stage 3, GFR 30-59 ml/min [facility])  Assessment & Plan  Lab Results   Component Value Date    EGFR 54 10/11/2023    EGFR 39 10/10/2023    EGFR 35 10/09/2023    CREATININE 1.04 10/11/2023    CREATININE 1.36 (H) 10/10/2023    CREATININE 1.50 (H) 10/09/2023     Baseline Cr appears to be between 0.75-1.1. Patient received 2 doses of Bumex IV 2 g as she had not been responding appropriately to IV 40 Lasix daily. Creatinine went up by 0.5 meeting criteria for an PO. This may be prerenal intrinsic or postrenal.  Potential prerenal causes include reduced kidney perfusion due to lisinopril. Intrinsic can also be lisinopril due to ATN, AIN from chemo medications, TLS, crystaline nephropathy from acyclovir, rituxan nephrotoxicity, filgrastim glomerulonephritis.  Post renal obstructive could be from urine retention from vincristine, cyclophosphamide bladder fibrosis. Suspect most likely due to medications as a combination of prerenal and intrinsic. Cr up to 2 today. FENa was 0.2%, UA shows no casts or signs of infection, urine eosinophils 0%. Patient likely has prerenal PO from volume depletion. Received 2 L NS and 1 pRBC and cr went up by 0.07 still and Na and Cl went down, suspect that volume prevented further cr rise and free water excretion impaired by kidney function, allowing hyponatremia to increase. Creatinine increase is likely plateaued. Went down today. Patient did receive 20 of Lasix overnight for volume overload which she responded well to. Suspect that now that nephrotoxic medications have been stopped she will continue to respond well to Lasix and creatinine will continue to downtrend. PO now resolved. Will be cautious about nephrotoxins    Plan  Continue to monitor    Pain syndrome, chronic  Assessment & Plan  Home regimen: Oxycodone 5 mg twice daily prn. Tylenol 650 mg every 8 hours prn. Gabapentin 600 mg 3 times daily. Medical marijuana. Lumbar radiculopathy. Impaired ambulatory dysfunction second to pain. Patient is taking opioids regularly for pain, has bowel regimen and is having bowel movements daily. Plan:  Continue gabapentin 300 mg TID, oxycodone 5 mg TID, prn Tylenol 650 mg q6. Benign essential hypertension  Assessment & Plan  Takes Coreg 12.5 mg BID, Amlodipine 10 mg daily, and lisinopril 40 mg daily all discontinued at this time secondary to hypotension and PO. If blood pressure is elevated, restart Coreg only, but stable currently without any antihypertensives. Systolic persistently elevated and tachycardic    Plan:  Monitor vitals  Restart Coreg 12.5 BID          ICU Core Measures     A: Assess, Prevent, and Manage Pain Has pain been assessed? Yes  Need for changes to pain regimen?  No   B: Both SAT/SAT  N/A   C: Choice of Sedation RASS Goal: 0 Alert and Calm or N/A patient not on sedation  Need for changes to sedation or analgesia regimen? No   D: Delirium CAM-ICU: Negative   E: Early Mobility  Plan for early mobility? No   F: Family Engagement Plan for family engagement today? No       Antibiotic Review: Awaiting culture results. Review of Invasive Devices:            Prophylaxis:  VTE VTE covered by:  enoxaparin, Subcutaneous       Stress Ulcer  covered byfamotidine (PEPCID) oral suspension 40 mg [[id number]], pantoprazole (PROTONIX) injection 40 mg [[id number]]          Subjective   HPI/24hr events:   Slater is out. Patient required increase FiO2 overnight. Was volume depleted and tachycardic which responded well to 500 Isolyte bolus, but started to creep back up. Per patient she slept well. The cuff is down, and she endorses pain and tenderness around trach. Review of Systems   Constitutional:  Positive for fever. Negative for chills. Respiratory:  Negative for cough, shortness of breath and wheezing. Cardiovascular:  Negative for chest pain, palpitations and leg swelling. Objective                            Vitals I/O      Most Recent Min/Max in 24hrs   Temp 99.5 °F (37.5 °C) Temp  Min: 99.2 °F (37.3 °C)  Max: 100.2 °F (37.9 °C)   Pulse (!) 122 Pulse  Min: 103  Max: 133   Resp 16 Resp  Min: 14  Max: 27   /77 BP  Min: 100/58  Max: 156/85   O2 Sat 93 % SpO2  Min: 86 %  Max: 99 %      Intake/Output Summary (Last 24 hours) at 10/11/2023 0741  Last data filed at 10/11/2023 0600  Gross per 24 hour   Intake 2550 ml   Output 2600 ml   Net -50 ml         Diet Enteral/Parenteral; Tube Feeding No Oral Diet; Jevity 1.5; Continuous; 50; Every 4 hours     Invasive Monitoring Physical exam    Physical Exam  Constitutional:       General: She is not in acute distress. Appearance: She is not ill-appearing. HENT:      Head: Normocephalic and atraumatic. Eyes:      Extraocular Movements: Extraocular movements intact.    Cardiovascular:      Rate and Rhythm: Normal rate and regular rhythm. Heart sounds: Normal heart sounds. No murmur heard. Pulmonary:      Effort: Pulmonary effort is normal. No respiratory distress. Breath sounds: Normal breath sounds. No wheezing. Comments: Reduced crackles  Abdominal:      Tenderness: There is abdominal tenderness. There is no guarding. Musculoskeletal:      Right lower leg: No edema. Left lower leg: No edema. Skin:     Coloration: Skin is not jaundiced or pale. Neurological:      Mental Status: She is alert.            Diagnostic Studies      EKG: Obtained 10/11 shows sinus tachycardia     Medications:  Scheduled PRN   busPIRone, 30 mg, TID  chlorhexidine, 15 mL, Q12H ASHLEY  enoxaparin, 40 mg, Q24H ASHLEY  famotidine, 20 mg, BID  gabapentin, 300 mg, TID  guaiFENesin, 200 mg, Q6H  levalbuterol, 1.25 mg, Q6H   And  ipratropium, 0.5 mg, Q6H  nicotine, 14 mg, Daily   Followed by  [START ON 10/16/2023] nicotine, 7 mg, Daily  oxyCODONE, 5 mg, Q8H  QUEtiapine, 50 mg, HS  senna, 8.8 mg, HS  sertraline, 75 mg, Daily  sodium chloride, 4 mL, Q6H      acetaminophen, 650 mg, Q6H PRN  HYDROmorphone, 0.2 mg, Q6H PRN  levalbuterol, 1.25 mg, Q8H PRN  ondansetron, 4 mg, Q8H PRN  oxyCODONE, 2.5 mg, Q4H PRN   Or  oxyCODONE, 5 mg, Q4H PRN  polyethylene glycol, 17 g, Daily PRN       Continuous          Labs:    CBC    Recent Labs     10/10/23  0535 10/11/23  0615   WBC 15.42* 18.60*   HGB 7.8* 8.7*   HCT 26.1* 28.3*    239   BANDSPCT 8  --      BMP    Recent Labs     10/10/23  0535 10/11/23  0615   SODIUM 140 141   K 5.1 4.3   CL 95* 97   CO2 38* 36*   AGAP 7 8   BUN 51* 38*   CREATININE 1.36* 1.04   CALCIUM 9.5 9.6       Coags    No recent results     Additional Electrolytes  Recent Labs     10/10/23  0535 10/11/23  0615   MG 2.1 2.0   PHOS 4.7* 4.7*   CAIONIZED 1.15  --           Blood Gas    No recent results  No recent results LFTs  Recent Labs     10/10/23  0535   ALT 24   AST 24   ALKPHOS 201*   ALB 2.7*   TBILI 0.39 Infectious  No recent results  Glucose  Recent Labs     10/10/23  0535 10/11/23  0615   GLUC 138 118                   Vivien Jerez MD

## 2023-10-11 NOTE — OCCUPATIONAL THERAPY NOTE
Occupational Therapy Cancellation Note        Patient Name: Burke Gonzalez  PSUQC'Q Date: 10/11/2023       10/11/23 7937   Note Type   Note type Cancelled Session   Cancel Reasons Refusal   Additional Comments Pt with active OT orders, spoke with RN Ganesh Collins who reports pt fatigued but appropriate to see. Attempted OT tx, pt declining therapy at this time, but states "I want to try tomorrow".  Will cancel at this time and f/u as schedule allows     Saravanan Watkins, OT

## 2023-10-11 NOTE — PROGRESS NOTES
Critical Care Attending Note; Teddy Eden   Note Date: 10/11/23  Note Time: 9:16 AM    /Patient: Ju Jacome  Age, : 77 y. o., 1955 MRN: [id number] Code Status: Level 1 - Full Code Patient Location: ICU 03/ICU [address] [place] [address] LOS:28 days  ]   Patient seen and examined, medical record reviewed, discussed with house staff and nursing staff. HPI   CC: BCell Lymphoma  67F with a PMH of HFpEF, COPD, CKD stage III, HTN, HLD admitted for pharengeal edema requiring intubation found to have diagnosis of B cell lymphoma.      Key Recent Events   : Admitted, Intubated  : Trach  : R-EPOCH cycle 1 day 1  : #7 Shiley XLT Cuffless  10/3: #7 Shiley XLT Cuff  10/6: Admitted MICU Hypoxia, Bronchoscopy     Main ICU Plans:       #Neuro   Chronic Pain/Bipolar  - Continue Home regimen     #Card  HTN  - Holding Norvasc, Lisinopril, Metoprolol     HFpEF - POCUS - LVH, Hyperdynamic, RV dilated, Normal Function, IVC Collapsible  - Hold diuretics    #Pulm  Tracheostomy - #7 Shiley XLT Cuff   - SBT - TCT daily - HFNC  - Wean FiO2 - Maintain O2 Sat >88%  - Pulmonary toilet regimen - Chest PT   - Vap/Trach care  - ENT consulted appreciate recs  - PT/OT    COPD - Severe Emphysema  - Duonebs    #ID  Leukocytosis/Fever - Likely related to filgastrim, malignancy   - Monitor for Infectious signs - Resend infectious workup if continued to be febrile    #Renal  PO - Cardiorenal? - Improved with diuretics - Resolved  - Renally dose medications      #GI  Dysphagia   - PEG    #Heme  B - Cell Lymphoma  - Oncology Consulted - R-EPOCH - Planned Cycle 2 10/13      #DVT/GI ppx  Lovenox    #Lines/Tubes/Drains:   Peripheral IV 23 Right;Ventral (anterior) Forearm (Active)   Number of days: 4       PICC Line  Right Basilic (Active)   Number of days: 4       NG/OG/Enteral Tube Nasogastric 16 Fr Right nare (Active)   Number of days: 3       #Nutrition:   Diet Enteral/Parenteral; Tube Feeding No Oral Diet; Jevity 1.5; Continuous; 50; Every 4 hours        #Code Status:   Level 1 - Full Code    #Dispo:   Alissa Shrestha MD  Pulmonary, Critical Care    Critical care time, excluding procedures, teaching, family meetings, and excludes any prior time recorded by the AP/resident, 35 minutes. Upon my evaluation, this patient has a high probability of imminent or life-threatening deterioration due to above problems which required my direct attention, intervention, and personal management. Impression/Active Problems:    B Cell lymphoma   Tracheostomy   Respiratory Failure   Secretions   HTN    TLS    Dysphagia   Chronic pain    PO   Cardiorenal syndrome   Leukocytosis    Physical Exam:     Vital Signs:   Weight: 81.3 kg (179 lb 3.7 oz)  IBW: Ideal body weight: 47.8 kg (105 lb 6.1 oz)  Adjusted ideal body weight: 61.2 kg (134 lb 14.7 oz)  Temp:  [99.2 °F (37.3 °C)-100.2 °F (37.9 °C)] 99.5 °F (37.5 °C)  HR:  [103-133] 122  Resp:  [14-27] 16  BP: (100-156)/(57-85) 144/77  General: NAD  Neuro: AxO 3  Heart: RRR  Lungs: Basilar crackles  Abdomen: Soft NT  Extremities: No edema                 Ventilator Settings:     Vent Mode: CPAP/PS Spont          Invalid input(s): "PCO2", "O2"  Radiologic Images Reviewed:   CXR  IMPRESSION:     Persistent pulmonary edema with small effusions.          Input / Output:       Intake/Output Summary (Last 24 hours) at 10/11/2023 0916  Last data filed at 10/11/2023 0600  Gross per 24 hour   Intake 2350 ml   Output 2200 ml   Net 150 ml            Infusions:     Scheduled Medications:  Current Facility-Administered Medications   Medication Dose Route Frequency Provider Last Rate    acetaminophen  650 mg Oral Q6H PRN Laina Savage MD      busPIRone  30 mg Oral TID Laina Savage MD      chlorhexidine  15 mL Mouth/Throat Q12H [address] Trent Spencer MD      enoxaparin  40 mg Subcutaneous Q24H [address] Wayne Parham MD      famotidine  20 mg Oral BID Max Morrison Rohit Spencer MD      gabapentin  300 mg Oral TID Lucas Hill MD      guaiFENesin  200 mg Oral Q6H Trent Spencer MD      HYDROmorphone  0.2 mg Intravenous Q6H PRN Gil Cottrell MD      levalbuterol  1.25 mg Nebulization Q6H Trent Spencer MD      And    ipratropium  0.5 mg Nebulization Q6H Trent Spencer MD      levalbuterol  1.25 mg Nebulization Q8H PRN uLcas Hill MD      nicotine  14 mg Transdermal Daily Vito Lesch, MD      Followed by    Mckenna Zeng ON 10/16/2023] nicotine  7 mg Transdermal Daily Vito Lesch, MD      ondansetron  4 mg Intravenous Q8H PRN Lucas Hill MD      oxyCODONE  2.5 mg Oral Q4H PRN Lucas Hill MD      Or    oxyCODONE  5 mg Oral Q4H PRN Lucas Hill MD      oxyCODONE  5 mg Oral Q8H Trent Spencer MD      polyethylene glycol  17 g Oral Daily PRN Lucas Hill MD      QUEtiapine  50 mg Oral HS RANDEE Ellis      senna  8.8 mg Per NG Tube HS Trent Spencer MD      sertraline  75 mg Per PEG Tube Daily RANDEE Ellis      sodium chloride  4 mL Nebulization Q6H Trent Spencer MD         PRN Medications:    acetaminophen    HYDROmorphone    levalbuterol    ondansetron    oxyCODONE **OR** oxyCODONE    [COMPLETED] polyethylene glycol **FOLLOWED BY** polyethylene glycol    Labs Reviewed:  Results from last 7 days   Lab Units 10/11/23  0615 10/10/23  0535 10/09/23  0536   WBC Thousand/uL 18.60* 15.42* 14.99*   HEMOGLOBIN g/dL 8.7* 7.8* 8.3*   HEMATOCRIT % 28.3* 26.1* 26.8*   PLATELETS Thousands/uL 239 182 167      Results from last 7 days   Lab Units 10/11/23  0615 10/10/23  0535 10/09/23  0536   SODIUM mmol/L 141 140 139   CO2 mmol/L 36* 38* 39*   BUN mg/dL 38* 51* 50*   CALCIUM mg/dL 9.6 9.5 9.7   MAGNESIUM mg/dL 2.0 2.1 2.1   PHOSPHORUS mg/dL 4.7* 4.7* 4.3*         Invalid input(s): "ASTSGOT", "ALTSGPT"LABRCNTIP@ ,alkphos:3,tbilirubin:3,dbilirubin:3)@      Invalid input(s): "TROPT", "CPK", "PBNP"             I have personally seen and examined the patient on (10/11/23 between 4777-3140). I discussed the patient with the AP/resident including, but not limited to, verifying findings; reviewing labs and x-rays; discussing with consultants; developing the plan of care with the bedside nurse; and discussing treatment plan with patient or surrogate. I have reviewed the note and assessment performed by the AP/resident and agree with the AP/resident’s documented findings and plan of care with the above additions/exceptions. Please see my comments for details and adjustments.

## 2023-10-11 NOTE — PROGRESS NOTES
Progress Note - Palliative & Supportive Care  [facility] SERVICE  77 y.o.  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    Myofascial pain syndrome    Pain syndrome, chronic    Angio-edema, initial encounter    Abnormal computed tomography angiography (CTA)    Bipolar disorder, unspecified (HCC)    Nicotine dependence    Bone lesion    CKD (chronic kidney disease) stage 3, GFR 30-59 ml/min (HCC)    Acute respiratory failure with hypoxia secondary to oropharyngeal mass    (HFpEF) heart failure with preserved ejection fraction ([address])    Pulmonary embolism (HCC)    Ambulatory dysfunction    Angioedema    Oropharyngeal mass    Blister (nonthermal) of left forearm, sequela    Large cell lymphoma (HCC)    Chemotherapy induced neutropenia     Chronic Superficial thrombophlebitis    Generalized abdominal pain    Depression     Active issues specifically addressed today include:   Acute respiratory failure with hypoxia  HFpEF  Ambulatory dysfunction  New onset right middle lobe pneumonia  PO  Oropharyngeal mass  Goals of care counseling  Palliative care encounter      PLAN:    1. Goals:   Full Code - all disease directed therapies without limits  Palliative will follow for ongoing goals of care discussions as situation evolves. 2. Social Support:  Supportive listening provided  Normalized experience of patient/family  Provided anxiety containment    3. Symptom management:  Per Critical Care team    4. Follow-up  We appreciate the opportunity to participate in this patient's care. We will continue to follow. PSC to follow up.   Please do not hesitate to contact our on-call provider through our clinic answering service at [phone] should there be an acute change or other symptom control concerns. Code status: Level 1 - Full Code   Decisional apparatus:  Patient does have capacity to make medical decisions on my exam today. If such capacity is lost, patient's substitute decision maker would default to adult children by [place] Act 169. Advance Directive / Living Will / POLST:  No    We appreciate the opportunity to participate in this patient's care. We will continue to follow. Please do not hesitate to contact our on-call provider through our clinic answering service at [phone] should you have acute symptom control concerns. Review of Systems   Constitutional:  Positive for fatigue. Negative for fever. Respiratory:  Negative for shortness of breath. Cardiovascular:  Negative for chest pain. Gastrointestinal:  Negative for abdominal pain. Musculoskeletal:  Negative for myalgias. Denies pain   Psychiatric/Behavioral:  Negative for sleep disturbance. States she is tired    All other systems reviewed and are negative.       MEDICATIONS / ALLERGIES:  all current active meds have been reviewed, current meds:   Current Facility-Administered Medications   Medication Dose Route Frequency    acetaminophen (TYLENOL) tablet 650 mg  650 mg Oral Q6H PRN    busPIRone (BUSPAR) tablet 30 mg  30 mg Oral TID    chlorhexidine (PERIDEX) 0.12 % oral rinse 15 mL  15 mL Mouth/Throat Q12H ASHLEY    enoxaparin (LOVENOX) subcutaneous injection 40 mg  40 mg Subcutaneous Q24H ASHLEY    famotidine (PEPCID) tablet 20 mg  20 mg Oral BID    gabapentin (NEURONTIN) capsule 300 mg  300 mg Oral TID    guaiFENesin (ROBITUSSIN) oral liquid 200 mg  200 mg Oral Q6H    HYDROmorphone HCl (DILAUDID) injection 0.2 mg  0.2 mg Intravenous Q6H PRN    levalbuterol (XOPENEX) inhalation solution 1.25 mg  1.25 mg Nebulization Q6H    And    ipratropium (ATROVENT) 0.02 % inhalation solution 0.5 mg  0.5 mg Nebulization Q6H    levalbuterol (XOPENEX) inhalation solution 1.25 mg  1.25 mg Nebulization Q8H PRN    nicotine (NICODERM CQ) 14 mg/24hr TD 24 hr patch 14 mg  14 mg Transdermal Daily    Followed by    Judith Moffett ON 10/16/2023] nicotine (NICODERM CQ) 7 mg/24hr TD 24 hr patch 7 mg  7 mg Transdermal Daily    ondansetron (ZOFRAN) injection 4 mg  4 mg Intravenous Q8H PRN    oxyCODONE (ROXICODONE) oral solution 2.5 mg  2.5 mg Oral Q4H PRN    Or    oxyCODONE (ROXICODONE) oral solution 5 mg  5 mg Oral Q4H PRN    oxyCODONE (ROXICODONE) oral solution 5 mg  5 mg Oral Q8H    polyethylene glycol (MIRALAX) packet 17 g  17 g Oral Daily PRN    QUEtiapine (SEROquel) tablet 50 mg  50 mg Oral HS    senna oral syrup 8.8 mg  8.8 mg Per NG Tube HS    sertraline (ZOLOFT) tablet 75 mg  75 mg Per PEG Tube Daily    sodium chloride 3 % inhalation solution 4 mL  4 mL Nebulization Q6H   , and PTA meds:   Prior to Admission Medications   Prescriptions Last Dose Informant Patient Reported? Taking? ASPIRIN-ACETAMINOPHEN-CAFFEINE PO  Self Yes No   Sig: Take by mouth   Calcium Carb-Cholecalciferol (OSCAL-D) 500 mg-200 units per tablet  Self Yes No   Sig: Take 1 tablet by mouth 2 (two) times a day with meals   EPINEPHrine (EPIPEN) 0.3 mg/0.3 mL SOAJ  Self No No   Sig: Inject 0.3 mL (0.3 mg total) into a muscle once for 1 dose   NON FORMULARY  Self Yes No   Sig: Hempvana roll on cream for pain   acetaminophen (TYLENOL) 650 mg CR tablet  Self No No   Sig: Take 1 tablet (650 mg total) by mouth every 8 (eight) hours as needed for moderate pain   albuterol (PROVENTIL HFA,VENTOLIN HFA) 90 mcg/act inhaler  Self No No   Sig: Inhale 2 puffs every 6 (six) hours as needed for wheezing or shortness of breath   amLODIPine (NORVASC) 10 mg tablet  Self No No   Sig: TAKE 1 TABLET BY MOUTH EVERY DAY   busPIRone (BUSPAR) 30 MG tablet   No No   Sig: TAKE 1 TABLET BY MOUTH 3 TIMES A DAY.    carvedilol (COREG) 25 mg tablet  Self No No   Sig: TAKE 1 TABLET BY MOUTH TWICE A DAY WITH FOOD   diclofenac sodium (VOLTAREN) 1 %  Self No No   Sig: APPLY 2 GRAMS TO AFFECTED AREA 4 TIMES A DAY famotidine (PEPCID) 20 mg tablet   No No   Sig: Take 1 tablet (20 mg total) by mouth 2 (two) times a day   fluticasone (FLONASE) 50 mcg/act nasal spray  Self No No   Sig: SPRAY 2 SPRAYS INTO EACH NOSTRIL EVERY DAY   fluticasone-umeclidinium-vilanterol (Trelegy Ellipta) 100-62.5-25 mcg/actuation inhaler  Self No No   Sig: Inhale 1 puff daily Rinse mouth after use.   gabapentin (NEURONTIN) 600 MG tablet  Self No No   Sig: Take 1 tablet (600 mg total) by mouth 3 (three) times a day   loratadine (CLARITIN) 10 mg tablet  Self No No   Sig: TAKE 1 TABLET BY MOUTH EVERY DAY   miconazole (MONISTAT-7) 2 % vaginal cream  Self No No   Sig: Insert 1 applicator into the vagina daily at bedtime   naloxone (NARCAN) 4 mg/0.1 mL nasal spray   No No   Sig: Administer 1 spray into a nostril. If no response after 2-3 minutes, give another dose in the other nostril using a new spray. nicotine (NICODERM CQ) 7 mg/24hr TD 24 hr patch   No No   Sig: Place 1 patch on the skin over 24 hours every 24 hours   oxyCODONE (OXY-IR) 5 MG capsule   No No   Sig: Take 1 capsule (5 mg total) by mouth 2 (two) times a day as needed for moderate pain Max Daily Amount: 10 mg   sertraline (ZOLOFT) 50 mg tablet  Self No No   Sig: TAKE 1 TABLET BY MOUTH EVERY DAY      Facility-Administered Medications: None       Allergies   Allergen Reactions    Methyldopa Shortness Of Breath and Other (See Comments)     Aldomet       OBJECTIVE:  /77 (BP Location: Left arm)   Pulse (!) 122   Temp 99.5 °F (37.5 °C) (Oral)   Resp 16   Ht 5' 1" (1.549 m)   Wt 81.3 kg (179 lb 3.7 oz)   SpO2 93%   BMI 33.87 kg/m²   Nursing notes reviewed. Physical Exam  Vitals reviewed. Constitutional:       General: She is not in acute distress. Appearance: She is ill-appearing. She is not diaphoretic. Comments: Easily arouses to verbal stimuli. Awake and alert, but appears fatigued. No acute distress. Comfortably resting in bed without signs of restlessness.    HENT: Head: Normocephalic. Mouth/Throat:      Mouth: Mucous membranes are dry. Eyes:      General:         Right eye: No discharge. Left eye: No discharge. Cardiovascular:      Rate and Rhythm: Tachycardia present. Pulmonary:      Effort: Pulmonary effort is normal. No respiratory distress. Abdominal:      General: Abdomen is flat. There is no distension. Musculoskeletal:         General: No swelling or tenderness. Right lower leg: No edema. Left lower leg: No edema. Skin:     General: Skin is warm and dry. Neurological:      Mental Status: She is alert. Psychiatric:         Mood and Affect: Mood normal.         Behavior: Behavior normal.         Lab Results: I have personally reviewed pertinent labs. HEMATOLOGY PROFILE:  Results from last 7 days   Lab Units 10/11/23  0615 10/10/23  0535 10/09/23  0536 10/08/23  0523 10/07/23  0444 10/06/23  0524   WBC Thousand/uL 18.60* 15.42* 14.99*   < > 12.26* 6.34   HEMOGLOBIN g/dL 8.7* 7.8* 8.3*   < > 8.1* 7.9*   HEMATOCRIT % 28.3* 26.1* 26.8*   < > 26.0* 25.5*   PLATELETS Thousands/uL 239 182 167   < > 147* 127*   NEUTROS PCT %  --   --   --   --   --  66   MONOS PCT %  --   --   --   --   --  14*   MONO PCT %  --  9 10  --  10  --    EOS PCT %  --  0 0  --  0 0    < > = values in this interval not displayed. CHEMISTRY PROFILE:  Results from last 7 days   Lab Units 10/11/23  0615 10/10/23  0535 10/09/23  0536 10/08/23  1248 10/08/23  0523   SODIUM mmol/L 141 140 139   < > 135   POTASSIUM mmol/L 4.3 5.1 5.2   < > 5.3   CHLORIDE mmol/L 97 95* 95*   < > 94*   CO2 mmol/L 36* 38* 39*   < > 37*   BUN mg/dL 38* 51* 50*   < > 51*   CREATININE mg/dL 1.04 1.36* 1.50*   < > 1.64*   ALBUMIN g/dL  --  2.7* 2.6*  --  2.5*   CALCIUM mg/dL 9.6 9.5 9.7   < > 9.2   ALK PHOS U/L  --  201* 235*  --  275*   ALT U/L  --  24 26  --  31   AST U/L  --  24 21  --  25    < > = values in this interval not displayed.        Albumin:  0   Lab Value Date/Time ALB 2.7 (L) 10/10/2023 0535    ALB 3.4 (L) 09/30/2015 0830     Imaging Studies: I have personally reviewed pertinent reports. EKG, Pathology, and Other Studies: I have personally reviewed pertinent reports. Counseling / Coordination of Care: Total floor / unit time spent today 20 minutes. Greater than 50% of total time was spent with the patient and / or family counseling and / or coordination of care. A description of the counseling / coordination of care: medication review, medication adjustment, psychosocial support, chart review, imaging review, lab review, supportive listening, and anticipatory guidance. Toribio Muse DO  [facility] Palliative and Supportive Care  [phone]    Portions of this document may have been created using dictation software and as such some "sound alike" terms may have been generated by the system. Do not hesitate to contact me with any questions or clarifications.

## 2023-10-12 ENCOUNTER — APPOINTMENT (INPATIENT)
Dept: CT IMAGING | Facility: HOSPITAL | Age: 68
DRG: 004 | End: 2023-10-12
Payer: COMMERCIAL

## 2023-10-12 LAB
ALBUMIN SERPL BCP-MCNC: 2.5 G/DL (ref 3.5–5)
ALBUMIN SERPL BCP-MCNC: 2.7 G/DL (ref 3.5–5)
ALP SERPL-CCNC: 163 U/L (ref 34–104)
ALP SERPL-CCNC: 168 U/L (ref 34–104)
ALT SERPL W P-5'-P-CCNC: 26 U/L (ref 7–52)
ALT SERPL W P-5'-P-CCNC: 28 U/L (ref 7–52)
ANION GAP SERPL CALCULATED.3IONS-SCNC: 6 MMOL/L
ANION GAP SERPL CALCULATED.3IONS-SCNC: 6 MMOL/L
ANISOCYTOSIS BLD QL SMEAR: PRESENT
AST SERPL W P-5'-P-CCNC: 26 U/L (ref 13–39)
AST SERPL W P-5'-P-CCNC: 28 U/L (ref 13–39)
BASOPHILS # BLD MANUAL: 0.17 THOUSAND/UL (ref 0–0.1)
BASOPHILS NFR MAR MANUAL: 1 % (ref 0–1)
BILIRUB SERPL-MCNC: 0.35 MG/DL (ref 0.2–1)
BILIRUB SERPL-MCNC: 0.37 MG/DL (ref 0.2–1)
BUN SERPL-MCNC: 32 MG/DL (ref 5–25)
BUN SERPL-MCNC: 37 MG/DL (ref 5–25)
CALCIUM ALBUM COR SERPL-MCNC: 10.3 MG/DL (ref 8.3–10.1)
CALCIUM ALBUM COR SERPL-MCNC: 10.5 MG/DL (ref 8.3–10.1)
CALCIUM SERPL-MCNC: 9.1 MG/DL (ref 8.4–10.2)
CALCIUM SERPL-MCNC: 9.5 MG/DL (ref 8.4–10.2)
CHLORIDE SERPL-SCNC: 96 MMOL/L (ref 96–108)
CHLORIDE SERPL-SCNC: 98 MMOL/L (ref 96–108)
CO2 SERPL-SCNC: 36 MMOL/L (ref 21–32)
CO2 SERPL-SCNC: 37 MMOL/L (ref 21–32)
CREAT SERPL-MCNC: 0.87 MG/DL (ref 0.6–1.3)
CREAT SERPL-MCNC: 1.01 MG/DL (ref 0.6–1.3)
EOSINOPHIL # BLD MANUAL: 0 THOUSAND/UL (ref 0–0.4)
EOSINOPHIL NFR BLD MANUAL: 0 % (ref 0–6)
ERYTHROCYTE [DISTWIDTH] IN BLOOD BY AUTOMATED COUNT: 16.3 % (ref 11.6–15.1)
ERYTHROCYTE [DISTWIDTH] IN BLOOD BY AUTOMATED COUNT: 16.3 % (ref 11.6–15.1)
GFR SERPL CREATININE-BSD FRML MDRD: 56 ML/MIN/1.73SQ M
GFR SERPL CREATININE-BSD FRML MDRD: 67 ML/MIN/1.73SQ M
GLUCOSE SERPL-MCNC: 108 MG/DL (ref 65–140)
GLUCOSE SERPL-MCNC: 124 MG/DL (ref 65–140)
HCT VFR BLD AUTO: 24.9 % (ref 34.8–46.1)
HCT VFR BLD AUTO: 27.2 % (ref 34.8–46.1)
HGB BLD-MCNC: 7.6 G/DL (ref 11.5–15.4)
HGB BLD-MCNC: 8.3 G/DL (ref 11.5–15.4)
HYPERCHROMIA BLD QL SMEAR: PRESENT
LG PLATELETS BLD QL SMEAR: PRESENT
LYMPHOCYTES # BLD AUTO: 1.39 THOUSAND/UL (ref 0.6–4.47)
LYMPHOCYTES # BLD AUTO: 7 % (ref 14–44)
MCH RBC QN AUTO: 30.5 PG (ref 26.8–34.3)
MCH RBC QN AUTO: 30.6 PG (ref 26.8–34.3)
MCHC RBC AUTO-ENTMCNC: 30.5 G/DL (ref 31.4–37.4)
MCHC RBC AUTO-ENTMCNC: 30.5 G/DL (ref 31.4–37.4)
MCV RBC AUTO: 100 FL (ref 82–98)
MCV RBC AUTO: 100 FL (ref 82–98)
METAMYELOCYTES NFR BLD MANUAL: 10 % (ref 0–1)
MONOCYTES # BLD AUTO: 1.73 THOUSAND/UL (ref 0–1.22)
MONOCYTES NFR BLD: 10 % (ref 4–12)
MYELOCYTES NFR BLD MANUAL: 5 % (ref 0–1)
NEUTROPHILS # BLD MANUAL: 11.26 THOUSAND/UL (ref 1.85–7.62)
NEUTS BAND NFR BLD MANUAL: 7 % (ref 0–8)
NEUTS SEG NFR BLD AUTO: 58 % (ref 43–75)
NRBC BLD AUTO-RTO: 1 /100 WBCS
PATHOLOGY REVIEW: YES
PHOSPHATE SERPL-MCNC: 4.7 MG/DL (ref 2.3–4.1)
PLATELET # BLD AUTO: 234 THOUSANDS/UL (ref 149–390)
PLATELET # BLD AUTO: 327 THOUSANDS/UL (ref 149–390)
PLATELET BLD QL SMEAR: ADEQUATE
PMV BLD AUTO: 10 FL (ref 8.9–12.7)
PMV BLD AUTO: 9.8 FL (ref 8.9–12.7)
PNEUMOCYSTIS PCR: NORMAL
POTASSIUM SERPL-SCNC: 4.4 MMOL/L (ref 3.5–5.3)
POTASSIUM SERPL-SCNC: 4.8 MMOL/L (ref 3.5–5.3)
PROCALCITONIN SERPL-MCNC: 0.23 NG/ML
PROMYELOCYTES NFR BLD MANUAL: 1 % (ref 0–0)
PROT SERPL-MCNC: 5.4 G/DL (ref 6.4–8.4)
PROT SERPL-MCNC: 6.1 G/DL (ref 6.4–8.4)
RBC # BLD AUTO: 2.48 MILLION/UL (ref 3.81–5.12)
RBC # BLD AUTO: 2.72 MILLION/UL (ref 3.81–5.12)
RBC MORPH BLD: PRESENT
SODIUM SERPL-SCNC: 139 MMOL/L (ref 135–147)
SODIUM SERPL-SCNC: 140 MMOL/L (ref 135–147)
VARIANT LYMPHS # BLD AUTO: 1 %
WBC # BLD AUTO: 17.32 THOUSAND/UL (ref 4.31–10.16)
WBC # BLD AUTO: 18.3 THOUSAND/UL (ref 4.31–10.16)

## 2023-10-12 PROCEDURE — 87102 FUNGUS ISOLATION CULTURE: CPT

## 2023-10-12 PROCEDURE — 87070 CULTURE OTHR SPECIMN AEROBIC: CPT

## 2023-10-12 PROCEDURE — 80053 COMPREHEN METABOLIC PANEL: CPT | Performed by: INTERNAL MEDICINE

## 2023-10-12 PROCEDURE — 84145 PROCALCITONIN (PCT): CPT | Performed by: STUDENT IN AN ORGANIZED HEALTH CARE EDUCATION/TRAINING PROGRAM

## 2023-10-12 PROCEDURE — 84100 ASSAY OF PHOSPHORUS: CPT | Performed by: STUDENT IN AN ORGANIZED HEALTH CARE EDUCATION/TRAINING PROGRAM

## 2023-10-12 PROCEDURE — 99233 SBSQ HOSP IP/OBS HIGH 50: CPT | Performed by: STUDENT IN AN ORGANIZED HEALTH CARE EDUCATION/TRAINING PROGRAM

## 2023-10-12 PROCEDURE — 87798 DETECT AGENT NOS DNA AMP: CPT

## 2023-10-12 PROCEDURE — G1004 CDSM NDSC: HCPCS

## 2023-10-12 PROCEDURE — 97530 THERAPEUTIC ACTIVITIES: CPT

## 2023-10-12 PROCEDURE — 99232 SBSQ HOSP IP/OBS MODERATE 35: CPT | Performed by: INTERNAL MEDICINE

## 2023-10-12 PROCEDURE — 94668 MNPJ CHEST WALL SBSQ: CPT

## 2023-10-12 PROCEDURE — 94760 N-INVAS EAR/PLS OXIMETRY 1: CPT

## 2023-10-12 PROCEDURE — 74177 CT ABD & PELVIS W/CONTRAST: CPT

## 2023-10-12 PROCEDURE — 85007 BL SMEAR W/DIFF WBC COUNT: CPT | Performed by: STUDENT IN AN ORGANIZED HEALTH CARE EDUCATION/TRAINING PROGRAM

## 2023-10-12 PROCEDURE — 71260 CT THORAX DX C+: CPT

## 2023-10-12 PROCEDURE — 87305 ASPERGILLUS AG IA: CPT

## 2023-10-12 PROCEDURE — NC001 PR NO CHARGE: Performed by: STUDENT IN AN ORGANIZED HEALTH CARE EDUCATION/TRAINING PROGRAM

## 2023-10-12 PROCEDURE — 85027 COMPLETE CBC AUTOMATED: CPT | Performed by: STUDENT IN AN ORGANIZED HEALTH CARE EDUCATION/TRAINING PROGRAM

## 2023-10-12 PROCEDURE — 94669 MECHANICAL CHEST WALL OSCILL: CPT

## 2023-10-12 PROCEDURE — 94664 DEMO&/EVAL PT USE INHALER: CPT

## 2023-10-12 PROCEDURE — 85027 COMPLETE CBC AUTOMATED: CPT | Performed by: INTERNAL MEDICINE

## 2023-10-12 PROCEDURE — 80053 COMPREHEN METABOLIC PANEL: CPT | Performed by: STUDENT IN AN ORGANIZED HEALTH CARE EDUCATION/TRAINING PROGRAM

## 2023-10-12 PROCEDURE — 87449 NOS EACH ORGANISM AG IA: CPT

## 2023-10-12 PROCEDURE — 97535 SELF CARE MNGMENT TRAINING: CPT

## 2023-10-12 PROCEDURE — 94640 AIRWAY INHALATION TREATMENT: CPT

## 2023-10-12 PROCEDURE — 94003 VENT MGMT INPAT SUBQ DAY: CPT

## 2023-10-12 PROCEDURE — 87205 SMEAR GRAM STAIN: CPT

## 2023-10-12 PROCEDURE — 94150 VITAL CAPACITY TEST: CPT

## 2023-10-12 RX ORDER — SULFAMETHOXAZOLE AND TRIMETHOPRIM 800; 160 MG/1; MG/1
1 TABLET ORAL 3 TIMES WEEKLY
Status: DISCONTINUED | OUTPATIENT
Start: 2023-10-13 | End: 2023-11-03

## 2023-10-12 RX ORDER — ACYCLOVIR 200 MG/1
400 CAPSULE ORAL 2 TIMES DAILY
Status: DISCONTINUED | OUTPATIENT
Start: 2023-10-13 | End: 2023-11-20

## 2023-10-12 RX ORDER — SODIUM CHLORIDE FOR INHALATION 3 %
4 VIAL, NEBULIZER (ML) INHALATION 3 TIMES DAILY
Status: DISCONTINUED | OUTPATIENT
Start: 2023-10-12 | End: 2023-10-12

## 2023-10-12 RX ORDER — ONDANSETRON 2 MG/ML
4 INJECTION INTRAMUSCULAR; INTRAVENOUS EVERY 8 HOURS PRN
Status: DISCONTINUED | OUTPATIENT
Start: 2023-10-13 | End: 2023-12-04 | Stop reason: HOSPADM

## 2023-10-12 RX ORDER — OXYCODONE HCL 5 MG/5 ML
5 SOLUTION, ORAL ORAL ONCE
Status: COMPLETED | OUTPATIENT
Start: 2023-10-12 | End: 2023-10-12

## 2023-10-12 RX ORDER — DEXTROSE, SODIUM CHLORIDE, SODIUM LACTATE, POTASSIUM CHLORIDE, AND CALCIUM CHLORIDE 5; .6; .31; .03; .02 G/100ML; G/100ML; G/100ML; G/100ML; G/100ML
250 INJECTION, SOLUTION INTRAVENOUS CONTINUOUS
Status: DISCONTINUED | OUTPATIENT
Start: 2023-10-12 | End: 2023-10-12

## 2023-10-12 RX ORDER — LEVOFLOXACIN 250 MG/1
250 TABLET, FILM COATED ORAL
Status: DISCONTINUED | OUTPATIENT
Start: 2023-10-13 | End: 2023-11-03

## 2023-10-12 RX ORDER — SODIUM CHLORIDE 9 MG/ML
20 INJECTION, SOLUTION INTRAVENOUS DAILY PRN
Status: DISPENSED | OUTPATIENT
Start: 2023-10-13 | End: 2023-10-18

## 2023-10-12 RX ORDER — OXYCODONE HCL 5 MG/5 ML
5 SOLUTION, ORAL ORAL EVERY 8 HOURS
Status: DISCONTINUED | OUTPATIENT
Start: 2023-10-12 | End: 2023-12-04 | Stop reason: HOSPADM

## 2023-10-12 RX ORDER — PREDNISONE 50 MG/1
60 TABLET ORAL 2 TIMES DAILY WITH MEALS
Status: COMPLETED | OUTPATIENT
Start: 2023-10-13 | End: 2023-10-17

## 2023-10-12 RX ORDER — FLUCONAZOLE 100 MG/1
200 TABLET ORAL DAILY
Status: DISCONTINUED | OUTPATIENT
Start: 2023-10-13 | End: 2023-11-03

## 2023-10-12 RX ORDER — SODIUM CHLORIDE FOR INHALATION 3 %
4 VIAL, NEBULIZER (ML) INHALATION
Status: DISCONTINUED | OUTPATIENT
Start: 2023-10-12 | End: 2023-10-15

## 2023-10-12 RX ADMIN — FAMOTIDINE 20 MG: 20 TABLET, FILM COATED ORAL at 08:10

## 2023-10-12 RX ADMIN — GABAPENTIN 300 MG: 300 CAPSULE ORAL at 21:23

## 2023-10-12 RX ADMIN — GUAIFENESIN 200 MG: 200 SOLUTION ORAL at 13:50

## 2023-10-12 RX ADMIN — BUSPIRONE HYDROCHLORIDE 30 MG: 10 TABLET ORAL at 17:43

## 2023-10-12 RX ADMIN — GABAPENTIN 300 MG: 300 CAPSULE ORAL at 08:10

## 2023-10-12 RX ADMIN — QUETIAPINE FUMARATE 50 MG: 25 TABLET ORAL at 21:23

## 2023-10-12 RX ADMIN — GUAIFENESIN 200 MG: 200 SOLUTION ORAL at 08:10

## 2023-10-12 RX ADMIN — SERTRALINE 75 MG: 25 TABLET, FILM COATED ORAL at 08:10

## 2023-10-12 RX ADMIN — CHLORHEXIDINE GLUCONATE 15 ML: 1.2 SOLUTION ORAL at 08:10

## 2023-10-12 RX ADMIN — OXYCODONE HYDROCHLORIDE 5 MG: 5 SOLUTION ORAL at 21:23

## 2023-10-12 RX ADMIN — FAMOTIDINE 20 MG: 20 TABLET, FILM COATED ORAL at 17:43

## 2023-10-12 RX ADMIN — ENOXAPARIN SODIUM 40 MG: 40 INJECTION SUBCUTANEOUS at 08:10

## 2023-10-12 RX ADMIN — GUAIFENESIN 200 MG: 200 SOLUTION ORAL at 21:15

## 2023-10-12 RX ADMIN — IOHEXOL 100 ML: 350 INJECTION, SOLUTION INTRAVENOUS at 16:16

## 2023-10-12 RX ADMIN — ONDANSETRON 4 MG: 2 INJECTION INTRAMUSCULAR; INTRAVENOUS at 08:08

## 2023-10-12 RX ADMIN — BUSPIRONE HYDROCHLORIDE 30 MG: 10 TABLET ORAL at 09:42

## 2023-10-12 RX ADMIN — SODIUM CHLORIDE SOLN NEBU 3% 4 ML: 3 NEBU SOLN at 13:32

## 2023-10-12 RX ADMIN — IPRATROPIUM BROMIDE 0.5 MG: 0.5 SOLUTION RESPIRATORY (INHALATION) at 07:18

## 2023-10-12 RX ADMIN — OXYCODONE HYDROCHLORIDE 5 MG: 5 SOLUTION ORAL at 13:50

## 2023-10-12 RX ADMIN — NICOTINE 14 MG: 14 PATCH, EXTENDED RELEASE TRANSDERMAL at 08:08

## 2023-10-12 RX ADMIN — IPRATROPIUM BROMIDE 0.5 MG: 0.5 SOLUTION RESPIRATORY (INHALATION) at 13:32

## 2023-10-12 RX ADMIN — Medication 8.8 MG: at 21:23

## 2023-10-12 RX ADMIN — SODIUM CHLORIDE SOLN NEBU 3% 4 ML: 3 NEBU SOLN at 19:50

## 2023-10-12 RX ADMIN — CHLORHEXIDINE GLUCONATE 15 ML: 1.2 SOLUTION ORAL at 21:23

## 2023-10-12 RX ADMIN — GABAPENTIN 300 MG: 300 CAPSULE ORAL at 17:43

## 2023-10-12 RX ADMIN — LEVALBUTEROL HYDROCHLORIDE 1.25 MG: 1.25 SOLUTION RESPIRATORY (INHALATION) at 07:17

## 2023-10-12 RX ADMIN — IPRATROPIUM BROMIDE 0.5 MG: 0.5 SOLUTION RESPIRATORY (INHALATION) at 19:50

## 2023-10-12 RX ADMIN — GUAIFENESIN 200 MG: 200 SOLUTION ORAL at 02:49

## 2023-10-12 RX ADMIN — OXYCODONE HYDROCHLORIDE 5 MG: 5 SOLUTION ORAL at 08:10

## 2023-10-12 RX ADMIN — BUSPIRONE HYDROCHLORIDE 30 MG: 10 TABLET ORAL at 21:23

## 2023-10-12 RX ADMIN — LEVALBUTEROL HYDROCHLORIDE 1.25 MG: 1.25 SOLUTION RESPIRATORY (INHALATION) at 13:32

## 2023-10-12 RX ADMIN — LEVALBUTEROL HYDROCHLORIDE 1.25 MG: 1.25 SOLUTION RESPIRATORY (INHALATION) at 19:50

## 2023-10-12 NOTE — CASE MANAGEMENT
Case Management Progress Note    Patient name Cornelio Alegre  Location ICU 03/ICU 03 MRN [id number]  : 1955 Date 10/12/2023       LOS (days): 29  Geometric Mean LOS (GMLOS) (days): 26.10  Days to GMLOS:-2.8        OBJECTIVE:        Current admission status: Inpatient  Preferred Pharmacy:   [id number] [place] [facility] - [address]. [address] [place] [address]  Phone: [phone] Fax: [address] [facility] [place] [address] [place] [address]  Phone: [phone] Fax: [phone]    Primary Care Provider: Karen Holden MD    Primary Insurance: [facility]  Secondary Insurance: [facility]    PROGRESS NOTE:      Weekly Care Management Length of Stay Review     Current LOS: 29 Days    Most Recent Labs:     Lab Results   Component Value Date/Time    WBC 17.32 (H) 10/12/2023 02:52 AM    HGB 7.6 (L) 10/12/2023 02:52 AM    HCT 24.9 (L) 10/12/2023 02:52 AM     10/12/2023 02:52 AM    BANDSPCT 8 10/10/2023 05:35 AM    SODIUM 140 10/12/2023 02:52 AM    K 4.8 10/12/2023 02:52 AM    CL 98 10/12/2023 02:52 AM    CO2 36 (H) 10/12/2023 02:52 AM    BUN 37 (H) 10/12/2023 02:52 AM    CREATININE 1.01 10/12/2023 02:52 AM    GLUC 124 10/12/2023 02:52 AM    ALKPHOS 163 (H) 10/12/2023 02:52 AM    ALT 26 10/12/2023 02:52 AM    AST 28 10/12/2023 02:52 AM    ALB 2.5 (L) 10/12/2023 02:52 AM    TBILI 0.35 10/12/2023 02:52 AM       Most Recent Vitals:   Vitals:    10/12/23 1204   BP:    Pulse:    Resp:    Temp:    SpO2: 98%        Identified Barriers to Discharge/Discharge Goals/Care Management Interventions:     Trach/PEG, struggle to wean from Vent. C/w chemo treatment, very specific treatment every 15 days. Intended Discharge Disposition: Placement concern due chemo frequency and the need to be admitted for each chemo treatment.       Expected Discharge Date: TBD

## 2023-10-12 NOTE — PLAN OF CARE
Problem: PHYSICAL THERAPY ADULT  Goal: Performs mobility at highest level of function for planned discharge setting. See evaluation for individualized goals. Description: Treatment/Interventions: Functional transfer training, LE strengthening/ROM, Elevations, Therapeutic exercise, Endurance training, Patient/family training, Equipment eval/education, Bed mobility, Gait training, Spoke to nursing, Spoke to case management          See flowsheet documentation for full assessment, interventions and recommendations. Note: Prognosis: Fair  Problem List: Decreased strength, Decreased endurance, Decreased mobility, Impaired balance, Decreased cognition, Impaired judgement, Decreased safety awareness, Obesity, Decreased skin integrity  Assessment: Pt demonstrates improved functional mobility as compared to previous session. Pt now supervision for bed mobility and functional transfers. Pt does fatigue easily and requires frequent rest between movements, however does not require physical assistance. Pt demonstrated improved sitting tolerance this session, maintaing sitting EOB with constant supervision for at least 20 minutes. Pt did require L UE support when fatigued. Overall pt is progressing slowly toward pt and PT goals. Pt would continue to benefit from skilled PT services, with emphasis on OOB mobility, transfer training, ambulation and dynamic balance training. Continue to reccomend post acute rehabilitation upon discharge. Given goals remain appropriate and discharge reccomendation remains unchanged, pt POC date extended this session. Barriers to Discharge: Inaccessible home environment, Decreased caregiver support (Pt is at home alone during the day; + PAUL her home)     PT Discharge Recommendation: Post acute rehabilitation services    See flowsheet documentation for full assessment.

## 2023-10-12 NOTE — PHYSICAL THERAPY NOTE
Physical Therapy Treatment Note    Patient's Name: James Olmstead  : 1955     10/12/23 1151   PT Last Visit   PT Visit Date 10/12/23   Note Type   Note Type Treatment   Pain Assessment   Pain Assessment Tool FLACC   Pain Rating: FLACC (Rest) - Face 0   Pain Rating: FLACC (Rest) - Legs 0   Pain Rating: FLACC (Rest) - Activity 0   Pain Rating: FLACC (Rest) - Cry 0   Pain Rating: FLACC (Rest) - Consolability 0   Score: FLACC (Rest) 0   Pain Rating: FLACC (Activity) - Face 1   Pain Rating: FLACC (Activity) - Legs 0   Pain Rating: FLACC (Activity) - Activity 0   Pain Rating: FLACC (Activity) - Cry 0   Pain Rating: FLACC (Activity) - Consolability 0   Score: FLACC (Activity) 1   Restrictions/Precautions   Weight Bearing Precautions Per Order No   Other Precautions Chair Alarm; Bed Alarm;O2;Multiple lines;Telemetry  (vent, trach collar, PEG Tube)   General   Chart Reviewed Yes   Response to Previous Treatment Patient reporting fatigue but able to participate. Family/Caregiver Present Yes   Cognition   Orientation Level Oriented X4   Memory Decreased recall of recent events   Following Commands Follows one step commands with increased time or repetition   Comments pt ID by wristband, name and    Subjective   Subjective pt initally hesistant with participation with PT, then agreeable   Bed Mobility   Supine to Sit 5  Supervision   Additional items Increased time required   Sit to Supine 4  Minimal assistance   Additional items Assist x 1; Increased time required;LE management   Additional Comments pt able to sit in long sit without support indpendently, able to sit EOB with supervision, no LOB noted, occasional use of L UE for support when fatigued, tolerates sitting EOB for approx 20 minutes   Transfers   Sit to Stand 5  Supervision   Additional items Increased time required   Stand to Sit 5  Supervision   Additional items Increased time required Additional Comments vc for steadying upon standing, pt performs x 1 STS for no more than 15 seconds, pt overall standing toelrance limited by fatigue   Ambulation/Elevation   Gait pattern Not appropriate   Balance   Static Sitting Fair +   Dynamic Sitting Fair   Static Standing Fair -   Dynamic Standing Poor +   Activity Tolerance   Activity Tolerance Patient limited by fatigue;Patient limited by pain   Medical Staff Made Aware care coordinated with [facility] alex   Nurse Made Aware RN rakesh pre/post   Assessment   Prognosis Fair   Problem List Decreased strength;Decreased endurance;Decreased mobility; Impaired balance;Decreased cognition; Impaired judgement;Decreased safety awareness; Obesity; Decreased skin integrity   Assessment Pt demonstrates improved functional mobility as compared to previous session. Pt now supervision for bed mobility and functional transfers. Pt does fatigue easily and requires frequent rest between movements, however does not require physical assistance. Pt demonstrated improved sitting tolerance this session, maintaing sitting EOB with constant supervision for at least 20 minutes. Pt did require L UE support when fatigued. Overall pt is progressing slowly toward pt and PT goals. Pt would continue to benefit from skilled PT services, with emphasis on OOB mobility, transfer training, ambulation and dynamic balance training. Continue to reccomend post acute rehabilitation upon discharge. Given goals remain appropriate and discharge reccomendation remains unchanged, pt POC date extended this session. Goals   Patient Goals to work with PT   STG Expiration Date 10/22/23   Short Term Goal #1 Patient will:  Increase bilateral LE strength 1/2 grade to facilitate independent mobility, Perform all bed mobility tasks w/ minx1 to improve pt's independence w/ repositioning for decrease risk of skin breakdown, Perform all transfers w/ minx1 consistently from various height surfaces in order to improve I w/ engagement w/ real-world environments/situations, Ambulate at least 10+ ft. with roller walker w/ minx1 w/o LOB to facilitate return and engagement w/ previous living environment, Increase ambulatory and static and dynamic standing balance 1 grade to decrease risk for falls, Tolerate at least 15 consecutive minutes of activity to demonstrate improved activity tolerance and endurance and Tolerate 3 hr OOB to faciliate upright tolerance   PT Treatment Day 3   Plan   Treatment/Interventions ADL retraining;Functional transfer training;LE strengthening/ROM; Elevations; Therapeutic exercise; Endurance training;Patient/family training;Equipment eval/education;Cognitive reorientation; Bed mobility;Gait training;Spoke to nursing;Spoke to case management;OT   Progress Progressing toward goals   PT Frequency 3-5x/wk   Discharge Recommendation   PT Discharge Recommendation Post acute rehabilitation services   AM-PAC Basic Mobility Inpatient   Turning in Flat Bed Without Bedrails 3   Lying on Back to Sitting on Edge of Flat Bed Without Bedrails 3   Moving Bed to Chair 2   Standing Up From Chair Using Arms 3   Walk in Room 1   Climb 3-5 Stairs With Railing 1   Basic Mobility Inpatient Raw Score 13   Basic Mobility Standardized Score 33.99   Highest Level Of Mobility   JH-HLM Goal 4: Move to chair/commode   JH-HLM Achieved 3: Sit at edge of bed   Education   Education Provided Mobility training   Patient Reinforcement needed   End of Consult   Patient Position at End of Consult Supine;Bed/Chair alarm activated; All needs within reach   The patient's AM-PAC Basic Mobility Inpatient Short Form Raw Score is 10. A Raw score of less than or equal to 16 suggests the patient may benefit from discharge to post-acute rehabilitation services. Please also refer to the recommendation of the Physical Therapist for safe discharge planning.     Kerri Alberto, PT

## 2023-10-12 NOTE — PROGRESS NOTES
[address] [facility]  Progress Note: Critical Care  Name: Ally Wood 77 y.o. female I MRN: [id number]  Unit/Bed#: ICU 03 I Date of Admission: 9/13/2023   Date of Service: 10/12/2023 I Hospital Day: 29    Assessment/Plan   * Acute respiratory failure with hypoxia secondary to oropharyngeal mass  Assessment & Plan  Cuffless trach placed 9/17, transitioned to cuffed on 10/3. Oxygen requirements not improving. For mental health patient is on buspirone, quetiapine, and sertraline. For pain, gabapentin, oxycodone scheduled and prn, and hydromorphone prn. On Guaifenesin, Atrovent and Xopenex for airway maintenance. No improvement in bilateral consolidation or atelectasis and groundglass opacities on repeat CT and chest x-rays. No change since bronchoscopy. Patient was not receiving proper positive pressure to open up atelectatic lung. Less crackles on exam. Bronchial cx with 3 colonies CoNS. PCP PCR invalid     Plan:  Attempt to wean O2 requirements-maintain cuff overnight from 9 pm to 6 am for positive pressure, can open cuff during the day for patient to speak  Viral culture from bronchoscopy pending  Repeat Bcx if febrile    Large cell lymphoma [facility])  Assessment & Plan  Biopsy on 9/17: Large B-cell lymphoma, stage II. First inpatient chemotherapy 9/22/2023- with R-EPOCH. 9/27 Rituxan. 9/28 Filgrastim. acyclovir, Zyloprim, Diflucan, Levaquin, Zofran, Bactrim for chemo prophylaxis, all discontinued with heme-onc's approval as Nafisa reached 4200. Todays CBC WBC up  18.6.   I suspect leukocytosis is secondary to the filgrastim, but given low grade fevers infection is also a possibility    Plan:  Inpatient and outpatient hematology oncology following, next inpatient [facility] cycle starts 10/13 will follow up regarding restarting ppx  See acute respiratory failure above    (HFpEF) heart failure with preserved ejection fraction [facility])  Assessment & Plan  Wt Readings from Last 3 Encounters:   10/12/23 77 kg (169 lb 12.1 oz)   09/07/23 74.6 kg (164 lb 6.4 oz)   08/30/23 73.3 kg (161 lb 9.6 oz)     Lab Results   Component Value Date    LVEF 60 08/28/2023     (H) 09/29/2023     (H) 09/13/2023     Echo 8/28/23: LVEF 60%. CXR 9/22: Persistent pulmonary venous congestion with small effusions and bibasilar atelectasis. Baseline creatinine around 0.8, PO resolved Cr down to 1.01 today. Plan:  BMP, magnesium; Goal Mg > 2 and K > 4; Replete prn  Measure I/O        Depression  Assessment & Plan  Patient on Zoloft 75 daily and Seroquel evening. Patient is depressed, family met with palliative on 10/5 for goals of care. Patient was firm that she wants to live and to fight, she is just tired. Generalized abdominal pain  Assessment & Plan  Patient reports abdominal pain which is unchanged since 9/22 no guarding on exam. Takes Famotidine for GERD at home. Alk phos 10/2 145, 10/12 163     - Continue Famotidine  - On bowel regimen with Senna and Miralax prn    Chronic Superficial thrombophlebitis  Assessment & Plan  Right forearm soreness. BUE ultrasound in setting of high risk of DVT. RUE US[de-identified] No evidence of acute or chronic deep vein thrombosis. 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 No longer requires renal dosing as PO resolved    -Continue DVT ppx with Lovenox 40    Blister (nonthermal) of left forearm, sequela  Assessment & Plan  Blisters of LUE healing well, no signs of infection. Continue daily wound care    Oropharyngeal mass  Assessment & Plan  9/14 Neck/ soft tissue CT: Large enhancing soft tissue mass identified within the left neck involving multiple spaces. Centered within the left oropharynx with extensions as described above into multiple spaces. This results in significant airway compromise. 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 Small level 3/level 4 nodes are seen within the neck. Tracheostomy by ENT, bx & addition testing performed. Replaced on 9/26.  H/o tobacco abuse, daily smoker. On nicotine while inpatient, confirmed with patient she needs nicotine patch    Plan:  ENT and heme onc following  Will titrate NRT weekly  See acute respiratory failure and large B cell lymphoma above      Angioedema  Assessment & Plan  POA in [place] [facility] ED: Swollen tongue, swelling soft and hard palate and almost the head of all phalanx is completely closed. Severe angioedema. Decadron 10 mg, Benadryl 25 mg given. Initially refused but eventually agreed with intubation. 2/2 oropharyngeal mass compression. Plan:  Cuffless trach placed 9/17, transitioned to cuffed 10/3  Tolerating trach   See acute respiratory failure with hypoxia 2/2 oropharyngeal mass above    Ambulatory dysfunction  Assessment & Plan  Impacted by chronic pain syndrome, but medication regimen may also contribute. Patient currently requires tracheostomy with ICU level care and will be inpatient for R-EPOCH cycle 2 starting tomorrow 10/13. Will require PT/ OT eval before discharge. CKD (chronic kidney disease) stage 3, GFR 30-59 ml/min [facility])  Assessment & Plan  Lab Results   Component Value Date    EGFR 56 10/12/2023    EGFR 54 10/11/2023    EGFR 39 10/10/2023    CREATININE 1.01 10/12/2023    CREATININE 1.04 10/11/2023    CREATININE 1.36 (H) 10/10/2023     Baseline Cr appears to be between 0.75-1.1. Patient received 2 doses of Bumex IV 2 g as she had not been responding appropriately to IV 40 Lasix daily. Creatinine went up by 0.5 meeting criteria for an PO. This may be prerenal intrinsic or postrenal.  Potential prerenal causes include reduced kidney perfusion due to lisinopril. Intrinsic can also be lisinopril due to ATN, AIN from chemo medications, TLS, crystaline nephropathy from acyclovir, rituxan nephrotoxicity, filgrastim glomerulonephritis. Post renal obstructive could be from urine retention from vincristine, cyclophosphamide bladder fibrosis.  Suspect most likely due to medications as a combination of prerenal and intrinsic. Cr up to 2 today. FENa was 0.2%, UA shows no casts or signs of infection, urine eosinophils 0%. Patient likely has prerenal PO from volume depletion. Received 2 L NS and 1 pRBC and cr went up by 0.07 still and Na and Cl went down, suspect that volume prevented further cr rise and free water excretion impaired by kidney function, allowing hyponatremia to increase. Creatinine increase is likely plateaued. Went down today. Patient did receive 20 of Lasix overnight for volume overload which she responded well to. Suspect that now that nephrotoxic medications have been stopped she will continue to respond well to Lasix and creatinine will continue to downtrend. PO now resolved. Will be cautious about nephrotoxins    Pain syndrome, chronic  Assessment & Plan  Home regimen: Oxycodone 5 mg twice daily. Tylenol 650 mg every 8 hours prn. Gabapentin 600 mg 3 times daily. Medical marijuana. Lumbar radiculopathy. Impaired ambulatory dysfunction second to pain. Patient is taking opioids regularly for pain, has bowel regimen and is having bowel movements daily. Plan:  Continue gabapentin 300 mg TID, oxycodone 5 mg TID, prn Tylenol 650 mg q6. Benign essential hypertension  Assessment & Plan  Takes Coreg 12.5 mg BID, Amlodipine 10 mg daily, and lisinopril 40 mg daily all discontinued at this time secondary to hypotension and PO. If blood pressure is elevated, restart Coreg only, but stable currently without any antihypertensives. Systolic persistently elevated and tachycardic, potentially secondary to pain    Plan:  Monitor vitals          ICU Core Measures     A: Assess, Prevent, and Manage Pain Has pain been assessed? Yes  Need for changes to pain regimen? Yes   B: Both SAT/SAT  N/A   C: Choice of Sedation RASS Goal: 0 Alert and Calm or N/A patient not on sedation  Need for changes to sedation or analgesia regimen?  No   D: Delirium CAM-ICU: Negative   E: Early Mobility  Plan for early mobility? No   F: Family Engagement Plan for family engagement today? No       [facility]ib[facility] Review: [facility]    Review of Invasive Devices:            Prophylaxis:  VTE VTE covered by:  enoxaparin, Subcutaneous, 40 mg at 10/12/23 0810       Stress Ulcer  covered byfamotidine (PEPCID) oral suspension 40 mg [[id number]], pantoprazole (PROTONIX) injection 40 mg [[id number]]          Subjective   HPI/24hr events:   Cuff up overnight. This morning patient is in pain, received oxycodone. Review of Systems   Constitutional:  Negative for fever. Respiratory:  Negative for chest tightness and shortness of breath. Cardiovascular:  Negative for chest pain and leg swelling. Gastrointestinal:  Positive for abdominal pain. Objective                            Vitals I/O      Most Recent Min/Max in 24hrs   Temp 99.9 °F (37.7 °C) Temp  Min: 99 °F (37.2 °C)  Max: 99.9 °F (37.7 °C)   Pulse (!) 135 Pulse  Min: 100  Max: 135   Resp 21 Resp  Min: 18  Max: 37   /74 BP  Min: 109/60  Max: 139/69   O2 Sat 96 % SpO2  Min: 88 %  Max: 99 %      Intake/Output Summary (Last 24 hours) at 10/12/2023 0858  Last data filed at 10/12/2023 0601  Gross per 24 hour   Intake 1225 ml   Output 1050 ml   Net 175 ml         Diet Enteral/Parenteral; Tube Feeding No Oral Diet; Two Telly HN; Continuous; 38; 190; Water; Every 4 hours     Invasive Monitoring Physical exam    Physical Exam  Constitutional:       Comments: Uncomfortable in bed from pain   HENT:      Head: Normocephalic and atraumatic. Cardiovascular:      Rate and Rhythm: Regular rhythm. Tachycardia present. Heart sounds: Normal heart sounds. No murmur heard. Pulmonary:      Effort: Pulmonary effort is normal.      Breath sounds: No wheezing. Abdominal:      Tenderness: There is abdominal tenderness. There is no guarding. Musculoskeletal:      Right lower leg: No edema. Left lower leg: No edema.    Skin:     Coloration: Skin is not jaundiced or pale. Neurological:      Mental Status: She is alert.            Diagnostic Studies      Imaging: CXR 10/11 results pending I have personally reviewed pertinent films in PACS     Medications:  Scheduled PRN   busPIRone, 30 mg, TID  chlorhexidine, 15 mL, Q12H ASHLEY  enoxaparin, 40 mg, Q24H ASHLEY  famotidine, 20 mg, BID  gabapentin, 300 mg, TID  guaiFENesin, 200 mg, Q6H  levalbuterol, 1.25 mg, TID   And  ipratropium, 0.5 mg, TID  nicotine, 14 mg, Daily   Followed by  Lilo Kearns ON 10/16/2023] nicotine, 7 mg, Daily  QUEtiapine, 50 mg, HS  senna, 8.8 mg, HS  sertraline, 75 mg, Daily      acetaminophen, 650 mg, Q6H PRN  ondansetron, 4 mg, Q8H PRN  polyethylene glycol, 17 g, Daily PRN       Continuous          Labs:    CBC    Recent Labs     10/11/23  0615 10/12/23  0252   WBC 18.60* 17.32*   HGB 8.7* 7.6*   HCT 28.3* 24.9*    234     BMP    Recent Labs     10/11/23  0615 10/12/23  0252   SODIUM 141 140   K 4.3 4.8   CL 97 98   CO2 36* 36*   AGAP 8 6   BUN 38* 37*   CREATININE 1.04 1.01   CALCIUM 9.6 9.1       Coags    No recent results     Additional Electrolytes  Recent Labs     10/11/23  0615 10/12/23  0252   MG 2.0  --    PHOS 4.7* 4.7*          Blood Gas    No recent results  No recent results LFTs  Recent Labs     10/12/23  0252   ALT 26   AST 28   ALKPHOS 163*   ALB 2.5*   TBILI 0.35       Infectious  No recent results  Glucose  Recent Labs     10/11/23  0615 10/12/23  0252   GLUC 118 124                   Leonid Deluca MD

## 2023-10-12 NOTE — PROGRESS NOTES
Medical Oncology/Hematology Progress Note  Cornelio Alegre, female, 77 y. 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 CT AP with no lymphadenopathy; Brain MRI no lesion; stage II Large B Cell Lymphoma. She started her dose-adjusted systemic treatment, R-EPOCH, on 9/22/23 with the last dose on 9/25/23. She received Rituxan infusion on 9/27/23. Her CMP and uric acid have been unremarkable for tumor lysis. Overall, patient tolerated chemotherapy and immunotherapy very well. She is also s/p IR gastrostomy tube placement on 9/27/23. Patient is s/p trach exchage to cuffed trach per pulmonology. Interval Hx:  She continues to be on a ventilator. Of note, SpO2 at 96% with FiO2 at 80%. Chest x-ray (10/11/23) showed diffuse abnormal airspace and groundglass opacities; correlate for infection. Per ICU team, patient showing minimal improvement even after after aggressive diuresis. She continues to be afebrile with persistent leukocytosis and tachycardia. On examination, patient is very good spirits. She feels well overall; expressed that she understands that chemo is tentatively scheduled for tomorrow. Assessment and Plan  1. 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 Started with growth factor on 9/28/23. Filgrastim (Neupogen) ordered at 5 mg/kg (375 mg) rounded to 300 mcg. Discontinued as of 10/6/23.    2. Left Jugular Lymphadenopathy     3.  Leukocytosis   - Likely a derivative from growth factor  -WBC 17.32 (10/12) < 15.42 (10/10) <14.99 < 13.06 < 12.26 < 6.34 < 0.20 < 0.21.        - ANC 11.26 (10/12) <10.18 <  9.44 < 8.09 < 0.94 < 0.12 < 0.02 < 0.00  -Continues to be afebrile, CT CAP pending  -Started with growth factor, Neupogen, on 23, d/c on 10/6/23      CBC  Hemoglobin 7.6 (10/12) <8.3 < 7.2 < 8.5 < 8.3 (10/2) < 9.4 <11.4 <11.7 ()   Platelets 263 (41/09) <182 <167 < 127< 77 (10/4) <61 < 68 (10/2) < 158 < 219 ()    Imagin23 CT PE study Abdomen/pelvis- Hilar and mediastinal lymphadenopathy similar to prior. No acute findings in the abdomen or pelvis. Chronic lytic lesion in T12 vertebral body gradually progressing since  with chronic pathologic fracture. 23 CTA Chest Rt middle lobe consolidation (PNA), slight regression of previous hilar and mediastinal LNs (had recent PNA in 2023)      23 CT Soft Tissue Neck w contrast: soft tissue mass ~ 6.1 x 4.7 x 5.2 cm appears to be centered within Lt oropharynx involving multiple spaces and extends into the nasopharynx. .. multiple small jugular chain nodes on the left. 23 nasopharynx mass biopsy initial results positive for malignancy favor poorly differentiated carcinoma. Cannot exclude lymphoma. Flow cytometry pending. PLAN:  -Agree with CT CAP to assess for other sources of possible infection    -Recommend CT soft tissue neck to assess tx response. Last imaging form 23.    -CBC daily. Transfuse for hemoglobin <7 g/dL    -For now, plan in place for Cycle 2 of dose-adjusted R-EPOCH on 10/13/23, Friday. Inpatient Chemo team aware of the plan. Plan to have bacterial, viral and fungal ppx in place with chemo administration. Please do not hesitate to reach out for questions or concerns. Tasha Trinh, [facility] [address], [address] [place] [address]  Hematology-Oncology      Chemotherapy HX:  Started growth factor on 23 with last day on 10/6/23. WBC decreased at 0.21 with absolute neutrophil count at 0.00 on 10/2/23. Commenced with neutropenic precautions. Patient continued to afebrile then, no complaints of bone pain from growth factor injections since 23.      Review of Systems Constitutional:  Positive for fatigue. Negative for chills, diaphoresis and fever. HENT:  Negative for ear pain and sore throat. Neck mass    Eyes:  Negative for pain and visual disturbance. Respiratory:  Negative for cough, chest tightness, shortness of breath and wheezing. Cardiovascular:  Negative for chest pain and palpitations. Gastrointestinal:  Negative for abdominal pain, blood in stool, diarrhea, nausea and vomiting. Genitourinary:  Negative for difficulty urinating, dysuria and hematuria. Musculoskeletal:  Negative for arthralgias and back pain. Skin:  Negative for color change and rash. Neurological:  Positive for weakness. Negative for dizziness, seizures, syncope and headaches. Psychiatric/Behavioral:  Negative for agitation and decreased concentration. The patient is nervous/anxious. All other systems reviewed and are negative.     Objective:     Medication Administration - last 24 hours from 10/11/2023 1528 to 10/12/2023 1528         Date/Time Order Dose Route Action Action by     10/12/2023 0810 EDT chlorhexidine (PERIDEX) 0.12 % oral rinse 15 mL 15 mL Mouth/Throat Given Pauline Sosa, TINO     10/11/2023 2156 EDT chlorhexidine (PERIDEX) 0.12 % oral rinse 15 mL 15 mL Mouth/Throat Given Jaron Messina, TINO     10/12/2023 1993 EDT busPIRone (BUSPAR) tablet 30 mg 30 mg Oral Given Pauline Sheila, RN     10/11/2023 2237 EDT busPIRone (BUSPAR) tablet 30 mg 30 mg Oral Given Jaron Messina, TINO     10/11/2023 1722 EDT busPIRone (BUSPAR) tablet 30 mg 30 mg Oral Given Pauline Sheila, RN     10/12/2023 0810 EDT gabapentin (NEURONTIN) capsule 300 mg 300 mg Oral Given Pauline Sheila, RN     10/11/2023 2156 EDT gabapentin (NEURONTIN) capsule 300 mg 300 mg Oral Given Jaron Messina, TINO     10/11/2023 1721 EDT gabapentin (NEURONTIN) capsule 300 mg 300 mg Oral Given Pauline Sheila, RN     10/11/2023 2237 EDT senna oral syrup 8.8 mg 8.8 mg Per NG Tube Given Jaron Messina, TINO     10/12/2023 1350 EDT guaiFENesin (ROBITUSSIN) oral liquid 200 mg 200 mg Oral Given Cristiane Mantle, RN     10/12/2023 5518 EDT guaiFENesin (ROBITUSSIN) oral liquid 200 mg 200 mg Oral Given Cristiane Mantle, RN     10/12/2023 0249 EDT guaiFENesin (ROBITUSSIN) oral liquid 200 mg 200 mg Oral Given Fabrice Nirav, RN     10/11/2023 2156 EDT guaiFENesin (ROBITUSSIN) oral liquid 200 mg 200 mg Oral Given Fabricecarolina Gastelum, RN     10/11/2023 2156 EDT acetaminophen (TYLENOL) tablet 650 mg 650 mg Oral Given Fabricecarolina Gastelum, RN     10/12/2023 0810 EDT famotidine (PEPCID) tablet 20 mg 20 mg Oral Given Cristiane Mantle, RN     10/11/2023 1722 EDT famotidine (PEPCID) tablet 20 mg 20 mg Oral Given Cristiane Mantle, RN     10/12/2023 7368 EDT ondansetron (ZOFRAN) injection 4 mg 4 mg Intravenous Given Cristinae Mantle, RN     10/12/2023 0810 EDT sertraline (ZOLOFT) tablet 75 mg 75 mg Per PEG Tube Given Cristiane Mantle, RN     10/11/2023 2155 EDT QUEtiapine (SEROquel) tablet 50 mg 50 mg Oral Given Fabrice Nirav, RN     10/12/2023 4002 EDT nicotine (NICODERM CQ) 14 mg/24hr TD 24 hr patch 14 mg 14 mg Transdermal Medication Applied Cristiane Mantle, RN     10/12/2023 0800 EDT nicotine (NICODERM CQ) 14 mg/24hr TD 24 hr patch 14 mg 14 mg Transdermal Patch Removed Cristiane Mantle, RN     10/12/2023 0810 EDT enoxaparin (LOVENOX) subcutaneous injection 40 mg 40 mg Subcutaneous Given Cristiane Mantle, RN     10/12/2023 1332 EDT levalbuterol (XOPENEX) inhalation solution 1.25 mg 1.25 mg Nebulization Given Celeste Barcenase, RT     10/12/2023 4171 EDT levalbuterol (Gareth Catron) inhalation solution 1.25 mg 1.25 mg Nebulization Given Ria Lima, RT     10/11/2023 2123 EDT levalbuterol (Gareth Catron) inhalation solution 1.25 mg 1.25 mg Nebulization Given Deja Childress, RT     10/12/2023 1332 EDT ipratropium (ATROVENT) 0.02 % inhalation solution 0.5 mg 0.5 mg Nebulization Given Arpan Betancourt, RT     10/12/2023 2658 EDT ipratropium (ATROVENT) 0.02 % inhalation solution 0.5 mg 0.5 mg Nebulization Given Ria Lima, RT     10/11/2023 2123 EDT ipratropium (ATROVENT) 0.02 % inhalation solution 0.5 mg 0.5 mg Nebulization Given Leny Davenport, RT     10/12/2023 0810 EDT oxyCODONE (ROXICODONE) oral solution 5 mg 5 mg Oral Given Darlynn Felty, TINO     10/12/2023 1350 EDT oxyCODONE (ROXICODONE) oral solution 5 mg 5 mg Oral Given Darlynn Felty, TINO     10/12/2023 1332 EDT sodium chloride 3 % inhalation solution 4 mL 4 mL Nebulization Given Nicole DOLORES Betancourt, RT            /81 (BP Location: Left arm)   Pulse (!) 118   Temp 99 °F (37.2 °C) (Oral)   Resp (!) 35   Ht 5' 1" (1.549 m)   Wt 77 kg (169 lb 12.1 oz)   SpO2 96%   BMI 32.07 kg/m²       Physical Exam  Constitutional:       Appearance: She is not ill-appearing. HENT:      Head: Normocephalic and atraumatic. Comments: Trach cuff in place     Right Ear: External ear normal.      Left Ear: External ear normal.      Nose: No congestion or rhinorrhea. Mouth/Throat:      Mouth: Mucous membranes are moist.   Eyes:      Extraocular Movements: Extraocular movements intact. Conjunctiva/sclera: Conjunctivae normal.      Pupils: Pupils are equal, round, and reactive to light. Neck:      Comments: cuffed Federico GONZALEZT #7 on 10/3/23  Cardiovascular:      Rate and Rhythm: Regular rhythm. Tachycardia present. Pulses: Normal pulses. Heart sounds: No murmur heard. No gallop. Pulmonary:      Effort: Pulmonary effort is normal. No respiratory distress. Breath sounds: No wheezing, rhonchi or rales. Comments: Vented  Abdominal:      General: Bowel sounds are normal. There is no distension. Palpations: There is no mass. Tenderness: There is no abdominal tenderness. There is no guarding. Comments: Peg tube in place, on tube feeding   Musculoskeletal:      Right lower leg: No edema. Left lower leg: No edema. Skin:     General: Skin is warm. Capillary Refill: Capillary refill takes less than 2 seconds.       Coloration: Skin is not jaundiced or pale.      Findings: No rash. Neurological:      General: No focal deficit present. Mental Status: She is alert and oriented to person, place, and time. Psychiatric:         Behavior: Behavior normal.         Thought Content:  Thought content normal.         Judgment: Judgment normal.      Comments: In good spirits         Recent Results (from the past 48 hour(s))   CBC    Collection Time: 10/11/23  6:15 AM   Result Value Ref Range    WBC 18.60 (H) 4.31 - 10.16 Thousand/uL    RBC 2.80 (L) 3.81 - 5.12 Million/uL    Hemoglobin 8.7 (L) 11.5 - 15.4 g/dL    Hematocrit 28.3 (L) 34.8 - 46.1 %     (H) 82 - 98 fL    MCH 31.1 26.8 - 34.3 pg    MCHC 30.7 (L) 31.4 - 37.4 g/dL    RDW 16.4 (H) 11.6 - 15.1 %    Platelets 818 354 - 830 Thousands/uL    MPV 9.7 8.9 - 12.7 fL   Basic metabolic panel    Collection Time: 10/11/23  6:15 AM   Result Value Ref Range    Sodium 141 135 - 147 mmol/L    Potassium 4.3 3.5 - 5.3 mmol/L    Chloride 97 96 - 108 mmol/L    CO2 36 (H) 21 - 32 mmol/L    ANION GAP 8 mmol/L    BUN 38 (H) 5 - 25 mg/dL    Creatinine 1.04 0.60 - 1.30 mg/dL    Glucose 118 65 - 140 mg/dL    Calcium 9.6 8.4 - 10.2 mg/dL    eGFR 54 ml/min/1.73sq m   Magnesium    Collection Time: 10/11/23  6:15 AM   Result Value Ref Range    Magnesium 2.0 1.9 - 2.7 mg/dL   Phosphorus    Collection Time: 10/11/23  6:15 AM   Result Value Ref Range    Phosphorus 4.7 (H) 2.3 - 4.1 mg/dL   CBC and differential    Collection Time: 10/12/23  2:52 AM   Result Value Ref Range    WBC 17.32 (H) 4.31 - 10.16 Thousand/uL    RBC 2.48 (L) 3.81 - 5.12 Million/uL    Hemoglobin 7.6 (L) 11.5 - 15.4 g/dL    Hematocrit 24.9 (L) 34.8 - 46.1 %     (H) 82 - 98 fL    MCH 30.6 26.8 - 34.3 pg    MCHC 30.5 (L) 31.4 - 37.4 g/dL    RDW 16.3 (H) 11.6 - 15.1 %    MPV 9.8 8.9 - 12.7 fL    Platelets 746 001 - 348 Thousands/uL   Comprehensive metabolic panel    Collection Time: 10/12/23  2:52 AM   Result Value Ref Range    Sodium 140 135 - 147 mmol/L Potassium 4.8 3.5 - 5.3 mmol/L    Chloride 98 96 - 108 mmol/L    CO2 36 (H) 21 - 32 mmol/L    ANION GAP 6 mmol/L    BUN 37 (H) 5 - 25 mg/dL    Creatinine 1.01 0.60 - 1.30 mg/dL    Glucose 124 65 - 140 mg/dL    Calcium 9.1 8.4 - 10.2 mg/dL    Corrected Calcium 10.3 (H) 8.3 - 10.1 mg/dL    AST 28 13 - 39 U/L    ALT 26 7 - 52 U/L    Alkaline Phosphatase 163 (H) 34 - 104 U/L    Total Protein 5.4 (L) 6.4 - 8.4 g/dL    Albumin 2.5 (L) 3.5 - 5.0 g/dL    Total Bilirubin 0.35 0.20 - 1.00 mg/dL    eGFR 56 ml/min/1.73sq m   Phosphorus    Collection Time: 10/12/23  2:52 AM   Result Value Ref Range    Phosphorus 4.7 (H) 2.3 - 4.1 mg/dL   Manual Differential(PHLEBS Do Not Order)    Collection Time: 10/12/23  2:52 AM   Result Value Ref Range    Segmented % 58 43 - 75 %    Bands % 7 0 - 8 %    Lymphocytes % 7 (L) 14 - 44 %    Monocytes % 10 4 - 12 %    Eosinophils, % 0 0 - 6 %    Basophils % 1 0 - 1 %    Metamyelocytes% 10 (H) 0 - 1 %    Myelocytes % 5 (H) 0 - 1 %    Promyelocytes % 1 (H) 0 - 0 %    Atypical Lymphocytes % 1 (H) <=0 %    Absolute Neutrophils 11.26 (H) 1.85 - 7.62 Thousand/uL    Lymphocytes Absolute 1.39 0.60 - 4.47 Thousand/uL    Monocytes Absolute 1.73 (H) 0.00 - 1.22 Thousand/uL    Eosinophils Absolute 0.00 0.00 - 0.40 Thousand/uL    Basophils Absolute 0.17 (H) 0.00 - 0.10 Thousand/uL    Total Counted      RBC Morphology Present     Platelet Estimate Adequate Adequate    Large Platelet Present     Pathology Review Yes (A) No    Anisocytosis Present     Hypochromia Present    Path Slide Review    Collection Time: 10/12/23  2:52 AM   Result Value Ref Range    Path Review       The peripheral blood smear shows left-shifted leukocytosis (WBC 17.32 K/uL) with neutrophilia (ANC 9.28 K/uL) and monocytosis (AMC 3.35 K/uL), in the background of macrocytic, hypochromic anemia with non-specific anisopoikilocytosis and rare circulating nucleated red blood cells.  The findings are favored to be reactive in the setting of lymphoma, recent chemotherapy and filgrastim, as well as other acute and complex medical conditions. There are rare atypical lymphocytes which may be reactive or may represent circulating lymphoma cells; peripheral blood flow cytometry can be performed to differentiate, if clinically indicated. If the CBC findings persist following resolution of symptoms, further workup is warranted at that time, as clinically indicated. Florian Cortez MD  Interpretation performed at [facility], [address] [facility], [address] [facility]. Procalcitonin    Collection Time: 10/12/23  2:52 AM   Result Value Ref Range    Procalcitonin 0.23 <=0.25 ng/ml       US right upper quadrant    Result Date: 9/22/2023  Narrative: RIGHT UPPER QUADRANT ULTRASOUND INDICATION:     CT finding N/V +Cancino. COMPARISON: CT abdomen/pelvis 9/21/2023 TECHNIQUE:   Real-time ultrasound of the right upper quadrant was performed with a curvilinear transducer with both volumetric sweeps and still imaging techniques. FINDINGS: Evaluation limited as per sonographer's note in PACS. PANCREAS:  Visualized portions of the pancreas are within normal limits. AORTA AND IVC:  Visualized portions are normal for patient age. LIVER: Size:  Within normal range. The liver measures 15.3 cm in the midclavicular line. Contour:  Surface contour is smooth. Parenchyma:  Echogenicity and echotexture are within normal limits. No liver mass identified. Limited imaging of the main portal vein shows it to be patent and hepatopetal. BILIARY: The gallbladder is normal in caliber. Gallbladder wall thickness upper limits of normal. No pericholecystic fluid. There is gallbladder sludge without evidence for shadowing calculi. No sonographic Cancino sign. No intrahepatic biliary dilatation. CBD measures 4.0 mm. No choledocholithiasis. KIDNEY: Right kidney measures 11.9 x 5.2 x 6.4 cm.  Volume 207.6 mL Several simple cysts, the largest of which measures 4.1 cm. 2.8 cm septated cyst in the upper pole. No hydronephrosis or perinephric collection. ASCITES:   None. Impression: No cholelithiasis. Gallbladder sludge is present with wall thickness upper limits of normal. Negative sonographic Cancino sign. Findings may be sequela of chronic gallbladder dysfunction. Consider follow-up with a HIDA scan if it would alter patient management. Workstation performed: [id number]     CT abdomen pelvis w contrast    Result Date: 9/21/2023  Narrative: CT ABDOMEN AND PELVIS WITH IV CONTRAST INDICATION:   Head/neck cancer, staging lymohoma staging prior to treatment with chemo. COMPARISON: 9/13/2023. TECHNIQUE:  CT examination of the abdomen and pelvis was performed. Multiplanar 2D reformatted images were created from the source data. This examination, like all CT scans performed in the [facility], was performed utilizing techniques to minimize radiation dose exposure, including the use of iterative reconstruction and automated exposure control. Radiation dose length product (DLP) for this visit:  813 mGy-cm IV Contrast:  100 mL of iohexol (OMNIPAQUE) Enteric Contrast:  Enteric contrast was not administered. FINDINGS: Mildly degraded by respiratory motion. ABDOMEN LOWER CHEST: There is bibasilar atelectasis. LIVER/BILIARY TREE:  Unremarkable. GALLBLADDER:  No calcified gallstones. There may be mild gallbladder wall thickening though evaluation is degraded by respiratory motion. No surrounding inflammatory changes. SPLEEN:  Unremarkable. PANCREAS:  Unremarkable. ADRENAL GLANDS:  Unremarkable. KIDNEYS/URETERS: Multiple bilateral renal cysts. No hydronephrosis. STOMACH AND BOWEL: There is colonic diverticulosis without evidence of acute diverticulitis. APPENDIX:  No findings to suggest appendicitis. ABDOMINOPELVIC CAVITY:  No ascites. No pneumoperitoneum. No lymphadenopathy. VESSELS:  Unremarkable for patient's age. PELVIS REPRODUCTIVE ORGANS:  Unremarkable for patient's age.  URINARY BLADDER: Unremarkable. ABDOMINAL WALL/INGUINAL REGIONS:  Fat containing right inguinal hernia noted. Fat-containing umbilical hernia. OSSEOUS STRUCTURES: Again seen is a destructive lytic lesion in the T12 vertebral body with sclerotic borders, progressed from 2013 and with chronic appearing pathologic fracture. Impression: 1. No abdominal lymphadenopathy. 2.  Question gallbladder wall thickening versus motion artifact. If there is clinical concern for gallbladder pathology, ultrasound is recommended. 3.  Chronic T12 lytic lesion with pathologic fracture. Workstation performed: [id number]     MRI soft tissue neck wo and w contrast    Result Date: 9/21/2023  Narrative: MRI OF THE SOFT TISSUES OF THE NECK - WITH AND WITHOUT CONTRAST INDICATION: Oropharyngeal mass s/p biopsy (+) for carcinoma COMPARISON: Neck CT from 9/14/2023 TECHNIQUE:  Multiplanar, multisequence imaging of the soft tissues of the neck was performed before and after gadolinium administration. . IV Contrast:  7.5 mL of Gadobutrol injection (SINGLE-DOSE) IMAGE QUALITY: Motion degrades images. FINDINGS: VISUALIZED BRAIN PARENCHYMA: No acute or suspicious findings. Please see today's brain MR report. VISUALIZED ORBITS AND PARANASAL SINUSES: Globes and orbits are within normal limits. Pan paranasal sinus mucosal thickening, greatest involving ethmoids and sphenoids. NASAL CAVITY, NASOPHARYNX, and OROHYPOPHARYNX and LARYNX: Approximately 7.5 x 4.0 x 7.8 cm (length X depth X width) soft tissue mass, epicenter likely the left lateral pharyngeal recess. Enhances and restricts diffusion. Central, irregular fluid signal intensity area without enhancement appears contiguous with fluid around an endotracheal tube, likely trapped secretions and circumferential mass. Mass extends from the left greater than right nasopharynx superiorly to the cricoid cartilage inferiorly. Extends to the posterior nasal cavity.  Displaces the soft palate, uvula and tongue base anteriorly. Oral cavity is otherwise within normal limits. Displaces the left pterygoid muscles anterolaterally. Extends posterior to the angle of the mandible approximately 1.4 cm, abutting and mildly laterally displacing the deep parotid, inseparable from the gland. Effaces the left parapharyngeal fat. Discrete epiglottis not seen, grossly anteriorly displaced. Extends along the left aryepiglottic fold and posterior pharyngeal wall to the to the inferior supraglottic airway, grossly terminating just above or at the left false cord. Extends to the left carotid space, encircles the carotid with severe narrowing and posterior-lateral displacement of the distal cervical and most proximal petrous vessel. Otherwise preserved left internal carotid flow-void. Posterior-lateral displacement  and occlusion of the internal jugular vein at the level of the angle of the mandible in the distal neck. Mass extends to the proximal jugular foramen. Laryngeal ventricles and true cords appear preserved. Normal fat signal  preserved in the cricoid and thyroid cartilages. Enteric and endotracheal tubes are displaced rightward. Trace retropharyngeal effusion. THYROID GLAND:   Within normal limits. PAROTID AND SUBMANDIBULAR GLANDS: Both submandibular and the right parotid glands are within normal limits. Deep left parotid involvement mentioned above. LYMPH NODES: Similar small jugular chain nodes more numerous on the left than the right, but none considered pathologically enlarged. 0.7 x 0.5 cm suspicious right retropharyngeal node (4/27). Left retropharyngeal space is obliterated by mass, no separate adenopathy. VASCULAR STRUCTURES: Left carotid a jugular involvement described above. Right carotid artery and jugular veins are within normal limits. THORACIC INLET: Similar to CT. Dependent lung opacities. CERVICAL SPINE: Normal appearance.  OTHER: Left greater than right mastoid effusions with patchy left enhancement and restricted diffusion were described in today's brain MR report. Asymmetric opacity involves the left petrous apex. Normal appearance of the IACs and inner ear structures. No cerebellopontine angle mass. Impression: Known, large left neck mass described in detail above. Workstation performed: [id number]     MRI brain w wo contrast    Result Date: 9/21/2023  Narrative: MRI BRAIN WITHOUT AND WITH CONTRAST INDICATION:  Oropharyngeal mass s/p biopsy (+) for carcinoma . COMPARISON: 6/17/2017 CT of the brain TECHNIQUE:  Multiplanar/multisequence MRI of the brain was performed administration of contrast. IV Contrast:  7.5 mL of Gadobutrol injection (SINGLE-DOSE) IMAGE QUALITY:  Diagnostic. FINDINGS: BRAIN PARENCHYMA: No  hemorrhage, extra-axial fluid collection, acute infarct, mass effect or midline shift. Mild, focal patchy periventricular and subcortical white matter foci of abnormal T2 and FLAIR hyperintensity are nonspecific, but usually secondary to changes of microangiopathy. Small developmental venous angioma in the left posterior frontal lobe, typically clinically insignificant. No suspicious enhancement or masses. VENTRICLES:  Within normal limits for age. SELLA AND PITUITARY GLAND:  Within normal limits. ORBITS:  Within normal limits. PARANASAL SINUSES: Mucosal thickening. VASCULATURE: Preserved skull base flow voids. Normal enhancement. CALVARIUM AND SKULL BASE: Bilateral mastoid effusions are likely secondary mass, related to eustachian tube obstruction from nasopharyngeal mass. Patient is also intubated. Small mucosal retention cyst at the right fossa of Rosenmuller. Small ill-defined foci of enhancement in the superior mastoid and asymmetric left mastoid restricted diffusion. No coalescence or discrete mass. Skull and visualized cervical spine are within normal limits. EXTRACRANIAL SOFT TISSUES:  A nasopharyngeal mass will be described in further detail on today's neck MR report.      Impression: No evidence of brain metastases. 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 Asymmetric patchy enhancement and restricted diffusion in the left mastoid. The differential includes neoplastic involvement and infection. Workstation performed: [id number]     XR chest portable    Result Date: 9/20/2023  Narrative: CHEST INDICATION:   NG tube placement. COMPARISON: 9/17/2023 EXAM PERFORMED/VIEWS:  XR CHEST PORTABLE FINDINGS: Tracheostomy tube is in stable position. Distal portions of nasogastric tube are seen below the hemidiaphragm within the left upper abdomen. The tip of the tube extends beyond the inferior margin of this study. Heart shadow is enlarged but unchanged from prior exam. There is mild pulmonary vascular congestion. The left costophrenic angle is obscured. Hazy opacities are additionally noted within the right costophrenic angle. No evidence of acute osseous abnormality. Impression: 1. Nasogastric tube is seen with its distal portions within the stomach. The tip of the tube courses beyond the inferior margin of the study. 2. Pulmonary vascular congestion with obscuration of the left hemidiaphragm. This is stable from prior radiograph and may represent small left effusion versus consolidation. 3. Right basilar atelectasis versus trace right effusion. Workstation performed: [id number]     XR chest portable    Result Date: 9/18/2023  Narrative: CHEST INDICATION:   Assess. COMPARISON:  None EXAM PERFORMED/VIEWS:  XR CHEST PORTABLE Images: 2 FINDINGS: Tracheostomy tube is seen in appropriate position. A feeding tube is seen extending down below the dome of the diaphragm Cardiomegaly seen Increased lung markings seen suggest congestion left base is obscured Osseous structures appear within normal limits for patient age. Impression: Increased lung markings seen suggest congestion.  The left base is obscured No worsening Workstation performed: [id number]     CT soft tissue neck w contrast    Result Date: 9/14/2023  Narrative: CT NECK WITH CONTRAST INDICATION:   Laryngeal edema COMPARISON:  None. TECHNIQUE:  Axial, sagittal, and coronal 2D reformatted images were created from the axial source data and submitted for interpretation. Radiation dose length product (DLP) for this visit:  769 mGy-cm . This examination, like all CT scans performed in the [facility], was performed utilizing techniques to minimize radiation dose exposure, including the use of iterative reconstruction and automated exposure control. IV Contrast:  85 mL of iohexol (OMNIPAQUE) IMAGE QUALITY:  Diagnostic. FINDINGS: VISUALIZED BRAIN PARENCHYMA:  No acute intracranial pathology of the visualized brain parenchyma. VISUALIZED ORBITS: Normal visualized orbits. PARANASAL SINUSES: Normal visualized paranasal sinuses. Nasopharynx, oropharynx AND HYPOPHARYNX: There is a large heterogeneously enhancing mass identified within the neck involving multiple spaces. The origin is difficult to ascertain given the large size. This mass measures approximately 6.1 x 4.7 x 5.2 cm and appears to be centered within the left oropharynx. The mass extends superiorly into the nasopharynx left more so than right and into the posterior aspect of the nasal cavity. The mass also extends across the midline within the oropharynx and inferiorly into the left lateral oropharynx but not below the laryngeal ventricle. The mass extends laterally into the infratemporal fossa and parapharyngeal fat and is inseparable from infratemporal musculature and deep lobe of the parotid gland. There is extension into the prevertebral space where the mass is inseparable from the anterior paraspinal muscle on the left and posteriorly and laterally into the carotid space where the mass is displacing and inseparable from jugular and carotid.  The mass encases the cervical internal carotid artery just below the level of the skull base resulting in narrowing of the vessel and the mass compresses and occludes the jugular vein below the skull base. The vessel does reconstitute via collateral vessels as it  extends inferiorly within the neck. The mass extends superiorly into the skull base inseparable from the carotid canal and jugular foramen. There is severe airway compromise within the posterior oral cavity and superior oropharynx. ET tube and OG tube are present. THYROID GLAND:  Unremarkable. PAROTID AND SUBMANDIBULAR GLANDS: Normal parotid and submandibular glands other than the mass abutting the deep lobe of the left parotid gland. LYMPH NODES: Multiple small jugular chain nodes are seen on the left. VASCULAR STRUCTURES: As described above the mass partially encases the cervical internal carotid artery, narrowing the vessel and occludes the jugular vein from the skull base to the mid neck. Below this the vessel is unremarkable due to collateral reconstitution. THORACIC INLET: Posterior left upper lobe consolidation may represent atelectasis or pneumonia. Mild patchy groundglass opacity within the upper lung fields. BONY STRUCTURES: At T1-2 there is a left paramedian bridging osteophyte resulting in mild to moderate left anterior lateral canal stenosis. Impression: Large enhancing soft tissue mass identified within the left neck involving multiple spaces. This appears to be centered within the left oropharynx with extensions as described above into multiple spaces. This results in significant airway compromise. 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 Small level 3 and level 4 nodes are seen within the neck. I personally discussed this study with ICU resident on 9/14/2023 4:44 PM. Workstation performed: [id number]     Bronchoscopy    Result Date: 9/14/2023  Narrative: Table formatting from the original result was not included.  [address] [place] [id number] [phone] DATE OF SERVICE: 9/14/23 PHYSICIAN(S): Attending: No Staff Documented Fellow: No Staff Documented INDICATION: Acute respiratory failure with hypoxia ([address]) POST-OP DIAGNOSIS: See the impression below. PREPROCEDURE: Standard airway preparation completed per respiratory therapy protocol. Informed consent was obtained. Images reviewed prior to the procedure. A Time Out was performed. No suspicion or identified risk for TB or other airborne infectious disease; bronchoscopy procedure being performed for diagnostic purposes. PROCEDURE: Bronchoscopy DETAILS OF PROCEDURE: Patient was taken to the procedure room where a time out was performed to confirm correct patient and correct procedure. The patient was already under sedation prior to the procedure. The patient's blood pressure, ECG, heart rate, level of consciousness, respirations and oxygen were monitored throughout the procedure. The patient experienced no blood loss. The scope was introduced through the endotracheal tube. The procedure was moderately difficult due to patient intolerance and persistent coughing. In response to procedure difficulty, deeper sedation was employed. The patient tolerated the procedure well. There were no apparent adverse events. ANESTHESIA INFORMATION: ASA: ASA status not filed in the log. Anesthesia Type: Anesthesia type not filed in the log.  FINDINGS: The lower trachea, main sujata, left main stem, KARTHIK, lingula, LLL, right main stem, RUL, bronchus intermedius, RML and RLL appeared normal. Left lower lung zone with no endobronchial lesions, left upper and lingula both appear normal Right upper, right middle and right lower lobes all appeared normal with no endobronchial lesions Endotracheal tube was retracted 3 cm under direct visualization with the bronchoscope Bronchoalveolar lavage was performed x1 in the right lateral segment (RB4) with 60 mL of saline instilled and a total return of 40 mL; the fluid appeared cloudy SPECIMENS: ID Type Source Tests Collected by Time Destination 1 :  Lavage Lung, Right Middle Lobe Bronchoalveolar Lavage PULMONARY CYTOLOGY Zuleika Vickers MD 9/14/2023 12:55 PM  A :  Bronchial Lung, Right Middle Lobe Bronchoalveolar Lavage FUNGAL CULTURE, VIRUS CULTURE, BRONCHIAL CULTURE AND GRAM STAIN, LEGIONELLA CULTURE, PNEUMOCYSTIS SMEAR BY DFA ([facility]), AFB CULTURE WITH STAIN Zuleika Vickers MD 9/14/2023 12:57 PM       Impression: BAL of the right middle lobe Endotracheal tube was retracted under direct visualization Tongue still appears swollen, vocal cords visualized outside of the endotracheal tube with the bronchoscope, no significant edema or mass noted RECOMMENDATION:  Follow-up infectious work-up      XR chest 1 view portable    Result Date: 9/13/2023  Narrative: CHEST INDICATION:   post intubation. COMPARISON: Same day chest radiograph and subsequent same day CT chest. Chest x-ray 8/27/2023. EXAM PERFORMED/VIEWS:  XR CHEST PORTABLE FINDINGS: Endotracheal tube terminates approximately 4 cm above the sujata. Heart shadow is enlarged but unchanged from prior exam. Bibasilar airspace opacities are again demonstrated. No appreciable pneumothorax. No evidence of acute osseous abnormality. Lucent lesion within T12 is better demonstrated on same-day CT. Impression: 1. Endotracheal tube terminates 4 cm above the sujata. 2. Redemonstration of bibasilar airspace opacities. Workstation performed: [id number]     XR chest 1 view portable    Result Date: 9/13/2023  Narrative: CHEST INDICATION:   post intubation, adjusting ET Tube. COMPARISON:  None EXAM PERFORMED/VIEWS:  XR CHEST PORTABLE FINDINGS: Endotracheal tube terminates approximately 3 cm above the sujata. Cardiomediastinal silhouette appears unremarkable. Bibasilar airspace opacities are again noted. No appreciable pneumothorax or pleural effusion. No evidence of acute osseous abnormality. Lucent lesion within T12 is better demonstrated on same-day CT. Impression: 1. Endotracheal tube terminates 3.3 cm above the sujata. 2. Bibasilar airspace opacities are again demonstrated. Workstation performed: [id number]     XR chest portable    Result Date: 9/13/2023  Narrative: CHEST INDICATION:   sob. COMPARISON: 8/27/2023. Subsequent CT chest performed the same day. EXAM PERFORMED/VIEWS:  XR CHEST PORTABLE FINDINGS: Heart shadow is enlarged but unchanged from prior exam. Hazy opacities are noted within the right lung base, possibly atelectasis versus pneumonia. Left basilar opacity is similar to prior radiograph. No appreciable pneumothorax. No evidence of acute osseous abnormality. Cystic lesion within the T12 vertebral body is better characterized on same-day CT. Impression: 1. Hazy bibasilar airspace opacities which may represent atelectasis versus pneumonia. Left basilar opacity is similar to recent radiograph while right basilar opacity is new Workstation performed: [id number]     CTA ED chest PE Study    Result Date: 9/13/2023  Narrative: CTA - CHEST WITH IV CONTRAST - PULMONARY ANGIOGRAM INDICATION:   R/O PE. Reports shortness of breath since being discharged from the hospital 3 weeks ago for pneumonia. Fatigue. Productive cough with white sputum. Angioedema. COMPARISON: Chest radiograph 9/13/2023, chest CT 8/25/2023, thoracic spine CT 11/13/2013. TECHNIQUE: CT angiogram timed for optimal opacification of the pulmonary arteries. Axial, sagittal, and coronal 2D reformats created from source data. Coronal 3D MIP postprocessing on the acquisition scanner. Radiation dose length product (DLP):  472 mGy-cm . Radiation dose exposure minimized using iterative reconstruction and automated exposure control. IV Contrast:  85 mL of iohexol (OMNIPAQUE) FINDINGS: PULMONARY ARTERIES:  No pulmonary embolus. Moderate pulmonary artery enlargement. LUNGS: Mild patchy new consolidation in the medial segment right middle lobe.  Improving opacity in the left upper lobe with mild residual atelectasis/scar. Redemonstration of mild dependent atelectasis in both lower lobes. Severe emphysema. AIRWAYS: No significant filling defects. ET tube 4 cm above the sujata. PLEURA:  Unremarkable. HEART/GREAT VESSELS: Moderate cardiomegaly. MEDIASTINUM AND PRASANTH: Slight regression of hilar and mediastinal lymphadenopathy. The largest node was previously 2.5 x 2.0 cm in the right infrahilar region and is now 1.9 x 1.3 cm (501/94). CHEST WALL AND LOWER NECK: Unremarkable. UPPER ABDOMEN: Right renal cysts. OSSEOUS STRUCTURES: Redemonstration of the large cystic lesion with sclerotic margins in T12 with destruction of the superior and inferior endplates, present as a much smaller thin-walled cystic lesion on the thoracic spine CT from 2013, imaged on a thoracic spine MRI from 2020. Impression: No pulmonary embolus. Patchy consolidation right middle lobe, new from 8/25/2023, compatible with pneumonia. Slight regression of previous hilar and mediastinal lymphadenopathy. Improving left upper lobe opacity which may have been due to pneumonia with residual atelectasis/scar. Persistent partial atelectasis of the lower lobes. 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 Workstation performed: [id number]     Echo complete w/ contrast if indicated    Result Date: 8/28/2023  Narrative:   Left Ventricle: Left ventricular cavity size is small. Wall thickness is increased. There is severe concentric hypertrophy. The left ventricular ejection fraction is 60%. Systolic function is normal. Wall motion is normal. Diastolic function is mildly abnormal, consistent with grade I (abnormal) relaxation. There is no LV dynamic obstruction at rest.   Right Ventricle: Right ventricular cavity size is normal. Systolic function is normal.   Left Atrium: The atrium is mildly dilated. Mitral Valve: There is mild to moderate regurgitation.  There is systolic anterior motion of the chordal apparatus with late peaking gradient 29 mmHg during Valsalva maneuver. Pericardium: There is a trivial pericardial effusion along the right atrial free wall. XR chest portable ICU    Result Date: 8/28/2023  Narrative: CHEST INDICATION:   Acute hypoxic respiratory failure. COMPARISON: 8/25/2023 EXAM PERFORMED/VIEWS:  XR CHEST PORTABLE ICU FINDINGS: Heart shadow is enlarged but unchanged from prior exam. There is stable left-sided volume loss secondary to left basilar atelectasis. No pneumothorax or pleural effusion. Osseous structures appear within normal limits for patient age. Impression: Stable volume loss on the left secondary to left lower lobe atelectasis. Workstation performed: [id number]     VAS lower limb venous duplex study, complete bilateral    Result Date: 8/27/2023  Narrative:  [facility] REPORT CLINICAL: Indications: Patient presents with bilateral lower extremity pain x 1 days. Operative History: cardiac surgery-date unknown. Risk Factors The patient has history of HTN and CKD. She has no history of Hyperlipidemia. CONCLUSION:  Impression: RIGHT LOWER LIMB: No evidence of acute or chronic deep vein thrombosis. No evidence of superficial thrombophlebitis noted. Doppler evaluation shows a normal response to augmentation maneuvers. . Popliteal, posterior tibial and anterior tibial arterial Doppler waveform's are triphasic. LEFT LOWER LIMB: No evidence of acute or chronic deep vein thrombosis. No evidence of superficial thrombophlebitis noted. Doppler evaluation shows a normal response to augmentation maneuvers. Popliteal, posterior tibial and anterior tibial arterial Doppler waveform's are triphasic. SIGNATURE: Electronically Signed by: [facility] Good[facility] on 2023-08-27 08:12:52 PM    CTA ED chest PE Study    Result Date: 8/25/2023  Narrative: CTA - CHEST WITH IV CONTRAST - PULMONARY ANGIOGRAM INDICATION:   Increased SOB, recent COVID diagnosis. COMPARISON: MRI from 3/18/2020 as well as bone scan from 7/2/2019 and thoracic spine from 11/13/2013 TECHNIQUE: CTA examination of the chest was performed using angiographic technique according to a protocol specifically tailored to evaluate for pulmonary embolism. Multiplanar 2D reformatted images were created from the source data. In addition, coronal 3D MIP postprocessing was performed on the acquisition scanner. The study is limited by some respiratory motion artifacts especially in the lower lobes on the left where there is crowding of vessels due to atelectatic changes. Radiation dose length product (DLP) for this visit:  370 mGy-cm . This examination, like all CT scans performed in the [facility], was performed utilizing techniques to minimize radiation dose exposure, including the use of iterative reconstruction and automated exposure control. IV Contrast:  80 mL of iohexol (OMNIPAQUE) FINDINGS: PULMONARY ARTERIAL TREE: Limited visualization left lower lobe however there is suspicion for a small embolus in the posterobasal segment on axial image 139, series 305 and coronal image 142. No definite filling defect is seen elsewhere. The main pulmonary artery is significantly dilated at 4.2 cm. The RV LV ratio is elevated at 1.1 cm. The primary pulmonary vascular stent minutes are also dilated chronicity is uncertain. LUNGS: Emphysema is noted with blebs at the apices. There is peripheral consolidation in the left upper lobe. Air bronchogram is not well seen and there is mucous occlusion of the left mainstem bronchus with volume loss of the left upper lobe as well as  left lower lobe to a lesser extent. Some aeration in the left lower lobe is still visible. PLEURA:  Unremarkable. HEART/GREAT VESSELS:  Unremarkable for patient's age. No thoracic aortic aneurysm. MEDIASTINUM AND PRASANTH: 10 mm pretracheal node is identified. 9 mm superior mediastinal node is identified.  10 mm prevascular node is identified. Subcarinal node measures 1.5 cm. Hilar adenopathy is also demonstrated. Right hilar node is larger measuring 1.8 cm in short axis on image 138. CHEST WALL AND LOWER NECK:   Unremarkable. VISUALIZED STRUCTURES IN THE UPPER ABDOMEN: Low-density cyst is seen in the right kidney. . OSSEOUS STRUCTURES: There appears to be a destructive lesion involving the T12 vertebral body eroding both endplates and much of the vertebral body this does not appear to represent a Schmorl's node. A cystic lesion was noted in 2013 at this location however the current lesion is larger with loss of endplates. MRI also imaged this lesion in 2020 the lesion appears somewhat larger on the current examination however. Impression: Limited study. There is equivocal embolus in the posterior basal segment left lower lobe. Other smaller segments are difficult to assess due to motion and vascular crowding. There is mucous plugging in the left mainstem bronchus with volume loss in portions of the left lower lobe and left upper lobe. Aspiration pneumonia is not excluded. There is dilatation of the main pulmonary artery and proximal pulmonary segment suggesting pulmonary hypertension this is more likely chronic than acute given the degree of distention. Emphysema is present. There is significant mediastinal and hilar adenopathy suggesting underlying neoplasm more likely than simple reactive nodes. Enlarging lesion at T12 is again demonstrated of uncertain etiology. This has been present since 2013. Perhaps a plasmacytoma is a consideration. Neoplastic work-up is advised. Pulmonology consultation is also advised to assess endobronchial obstruction on the left. This was discussed with resident physician Dr. Marge Ornelas at 4:58 p.m.  Follow-up was marked in the epic system Workstation performed: [id number]     XR chest 1 view portable    Result Date: 8/25/2023  Narrative: CHEST INDICATION:   SOB. COMPARISON: 8/21/2021 EXAM PERFORMED/VIEWS:  XR CHEST PORTABLE Single view FINDINGS: Development of CHF. Probable left basal infiltrate. Cardiomediastinal silhouette has become more enlarged. No pneumothorax or pleural effusion. Osseous structures appear within normal limits for patient age. Impression: Increased cardiomegaly. Development of CHF.  Probable left basal infiltrate Workstation performed: [id number]

## 2023-10-12 NOTE — OCCUPATIONAL THERAPY NOTE
Occupational Therapy Progress Note     Patient Name: Sundeep MARTINEZ Date: 10/12/2023  Problem List  Principal Problem:    Acute respiratory failure with hypoxia secondary to oropharyngeal mass  Active Problems:    Benign essential hypertension    Pain syndrome, chronic    CKD (chronic kidney disease) stage 3, GFR 30-59 ml/min (HCC)    (HFpEF) heart failure with preserved ejection fraction (HCC)    Ambulatory dysfunction    Angioedema    Oropharyngeal mass    Blister (nonthermal) of left forearm, sequela    Large cell lymphoma (HCC)    Chronic Superficial thrombophlebitis    Generalized abdominal pain    Depression              10/12/23 1153   OT Last Visit   OT Visit Date 10/12/23   Note Type   Note Type Treatment   Pain Assessment   Pain Assessment Tool 0-10   Pain Score No Pain   Restrictions/Precautions   Weight Bearing Precautions Per Order No   Other Precautions Chair Alarm; Bed Alarm;Multiple lines;O2;Fall Risk;Pain  (vent + trach, PEG tube)   Lifestyle   Autonomy PTA pt living with daughter in [facility] with [facility], pt requiring (A) with ADLs and IADLs, (+)falls, (-)drives use of quad cane vs RW at baseline   Reciprocal Relationships supportive daughter however daughter does work during the day   Service to Others retired   Intrinsic Gratification reports that she would like to spend time with some babies, enjoys talking about PT's son   ADL   Grooming Assistance 5  Supervision/Setup   Grooming Comments washing and applying lotion to face   LB Dressing Assistance 5  Supervision/Setup   LB Dressing Deficit Increased time to complete;Supervision/safety;Verbal cueing; Don/doff R sock; Don/doff L sock   Bed Mobility   Supine to Sit 5  Supervision   Additional items Increased time required;Verbal cues   Sit to Supine 4  Minimal assistance   Additional items Assist x 1; Increased time required;Verbal cues;LE management   Additional Comments Sitting at EOB approx 20 min with overall (S)   Transfers   Sit to Stand 5 Supervision   Additional items Increased time required;Verbal cues   Stand to Sit 5  Supervision   Additional items Increased time required;Verbal cues   Additional Comments x1 trial of sit >< stand, able to stand approx 15 sec with close (S), limited by fatigue   Functional Mobility   Additional Comments declining trial to advance at this time   Subjective   Subjective Able to verbalize with trach + vent + partially deflated cuff. Pt with improved verbalizing thruoghout session "I am stubborn and I do what I want to do"   Cognition   Overall Cognitive Status [facility]   Arousal/Participation Alert; Cooperative   Attention Within functional limits   Orientation Level Oriented X4   Memory Decreased recall of recent events   Following Commands Follows one step commands with increased time or repetition   Comments delayed responses to commands, mostly due to fatigue. Pt tearful during session about current situation, requiring encouragement to participate and therapeutic conversation to improve affect   Activity Tolerance   Activity Tolerance Patient limited by fatigue   Medical Staff Made Aware PT Marietta Chairez, TINO Jerman   Assessment   Assessment Pt seen on this date for skilled OT treatment session. At start of session pt supine in bed. Pt requiring motivation and encouragement for participation. Pt requiring extensive increased time for ADL tasks due to overall fatigue. Demonstrating improved functional mobility and activity tolerance from previous session. Pt highly benefiting from emotional support from therapists + rest breaks throughout sitting EOB. Pt with overall improved mood towards end of session + improved performance. Agreeable to sit EOB with nursing staff daily. Pt would continue to benefit from skilled OT treatment sessions in order to address remaining deficits and continue to recommend d/c to rehab when medically stable.    Plan   Goal Expiration Date 10/26/23  (goal dated extended at this time)   OT Treatment Day 4 OT Frequency 3-5x/wk   Discharge Recommendation   OT Discharge Recommendation Post acute rehabilitation services   AM-PAC Daily Activity Inpatient   Lower Body Dressing 3   Bathing 2   Toileting 2   Upper Body Dressing 3   Grooming 3   Eating 1  (PEG tube: anticipate 3 when able to eat)   Daily Activity Raw Score 14   Daily Activity Standardized Score (Calc for Raw Score >=11) 33.39   AM-PAC Applied Cognition Inpatient   Following a Speech/Presentation 3   Understanding Ordinary Conversation 4   Taking Medications 2   Remembering Where Things Are Placed or Put Away 3   Remembering List of 4-5 Errands 3   Taking Care of Complicated Tasks 2   Applied Cognition Raw Score 17   Applied Cognition Standardized Score 36.52   End of Consult   Patient Position at End of Consult Supine;Bed/Chair alarm activated; All needs within reach       GOALS:   Goals updated following re-evaluation in order to promote pt's established goal of going home     -Patient will perform grooming tasks in stance with overall Mod I in order to increase overall independence PROGRESSING     -Patient will be supervision with UB dressing using AE and AD as needed in order to increase (I) with ADLs      -Patient will be supervision  with UB bathing using AE and AD as needed in order to increase (I) with ADLs     -Patient will be Min A with LB dressing with use of AE and AD as needed in order to increase (I) with ADLs MET: upgrade to mod I     -Patient will be Min A with LB bathing with use of AE and AD as needed in order to increase (I) with ADLs     -Patient will complete toileting w/ supervision w/ G hygiene/thoroughness in order to reduce caregiver burden      -Patient will demonstrate supervision with bed mobility for ability to manage own comfort and initiate OOB tasks.  MET: upgrade to mod I     -Patient will perform functional transfers with supervision to/from all surfaces using DME as needed in order to increase (I) with functional tasks MET: upgrade to mod I     -Patient will be supervision with functional mobility to/from bathroom for increased independence with toileting tasks     -Patient will independently integrate one pacing strategy into morning ADL's. PROGRESSING     -Patient will demonstrate standing for 3 min in order to increase active participation in functional activities [address]          The patient's raw score on the AM-PAC Daily Activity Inpatient Short Form is 14. A raw score of less than 19 suggests the patient may benefit from discharge to post-acute rehabilitation services. Please refer to the recommendation of the Occupational Therapist for safe discharge planning. This session, pt required and most appropriately benefited from skilled OT/PT co-treat due to significant regression from functional baseline, continuous vitals monitoring, decreased activity tolerance, and unpredictable medical and/or functional status. OT and PT goals were addressed separately as seen in documentation.      Marisa Smith MS, OTR/L

## 2023-10-12 NOTE — PLAN OF CARE
Problem: OCCUPATIONAL THERAPY ADULT  Goal: Performs self-care activities at highest level of function for planned discharge setting. See evaluation for individualized goals. Description: Treatment Interventions: ADL retraining, Functional transfer training, Endurance training, Patient/family training, Equipment evaluation/education, Compensatory technique education, Activityengagement, Energy conservation          See flowsheet documentation for full assessment, interventions and recommendations. Outcome: Progressing  Note: Limitation: Decreased ADL status, Decreased UE strength, Decreased cognition, Decreased Safe judgement during ADL, Decreased endurance, Decreased self-care trans, Decreased high-level ADLs (+ pain, poor trunk control, balance, functional reach, activity tolerance)  Prognosis: Good  Assessment: Pt seen on this date for skilled OT treatment session. At start of session pt supine in bed. Pt requiring motivation and encouragement for participation. Pt requiring extensive increased time for ADL tasks due to overall fatigue. Demonstrating improved functional mobility and activity tolerance from previous session. Pt highly benefiting from emotional support from therapists + rest breaks throughout sitting EOB. Pt with overall improved mood towards end of session + improved performance. Agreeable to sit EOB with nursing staff daily. Pt would continue to benefit from skilled OT treatment sessions in order to address remaining deficits and continue to recommend d/c to rehab when medically stable.      OT Discharge Recommendation: Post acute rehabilitation services

## 2023-10-12 NOTE — PROGRESS NOTES
Critical Care Attending Note; Svitlana Ellis   Note Date: 10/12/23  Note Time: 12:03 PM    /Patient: Marva Garcia  Age, : 77 y. o., 1955 MRN: [id number] Code Status: Level 1 - Full Code Patient Location: ICU 03/ICU [address] [place] [address] LOS:29 days  ]   Patient seen and examined, medical record reviewed, discussed with house staff and nursing staff. HPI   CC: BCell Lymphoma  67F with a PMH of HFpEF, COPD, CKD stage III, HTN, HLD admitted for pharengeal edema requiring intubation found to have diagnosis of B cell lymphoma. Key Recent Events   : Admitted, Intubated  : Dulce Dy  : R-EPOCH cycle 1 day 1  : #7 Shiley XLT Cuffless  10/3: #7 Shiley XLT Cuff  10/6: Admitted MICU Hypoxia, Bronchoscopy     Main ICU Plans:       #Neuro   Chronic Pain/Bipolar  - Continue Home regimen     #Card  HTN  - Holding Norvasc, Lisinopril, Metoprolol     HFpEF - CVP 8mmHg  - Hold diuretics    #Pulm  Tracheostomy - #7 Shiley XLT Cuff - Patient showing minimal improvement, after aggressive diuresis. Will Image chest further to look at distribution and resend tracheal infectious work up.  Lower concerns for chemo induced pneumonitis after first dose.   - SBT - Daily   - Wean FiO2 - Maintain O2 Sat >88%  - Pulmonary toilet regimen - Chest PT   - Vap/Trach care  - PT/OT  - Send Bacterial, Fungal, and PCP  - CT Chest Abd pelvis    COPD - Severe Emphysema  - Duonebs    #ID  Leukocytosis/Fever - Likely related to filgastrim, malignancy   - Monitor for Infectious signs - Resend infectious workup if continued to be febrile    #Renal  PO - Cardiorenal? - Improved with diuretics - Resolved  - Renally dose medications      #GI  Dysphagia   - PEG    #Heme  B - Cell Lymphoma  - Oncology Consulted - R-EPOCH - Planned Cycle 2 10/13      #DVT/GI ppx  Lovenox    #Lines/Tubes/Drains:   Peripheral IV 23 Right;Ventral (anterior) Forearm (Active)   Number of days: 4       PICC Line  Right Basilic (Active)   Number of days: 4       NG/OG/Enteral Tube Nasogastric 16 Fr Right nare (Active)   Number of days: 3       #Nutrition:   Diet Enteral/Parenteral; Tube Feeding No Oral Diet; Two Telly HN; Continuous; 38; 190; Water; Every 4 hours        #Code Status:   Level 1 - Full Code    #Dispo:   Jhonatan Bowen MD  Pulmonary, Critical Care    Critical care time, excluding procedures, teaching, family meetings, and excludes any prior time recorded by the AP/resident, 35 minutes. Upon my evaluation, this patient has a high probability of imminent or life-threatening deterioration due to above problems which required my direct attention, intervention, and personal management. Impression/Active Problems:    B Cell lymphoma   Tracheostomy   Respiratory Failure   Secretions   HTN    TLS    Dysphagia   Chronic pain    PO   Cardiorenal syndrome   Leukocytosis    Physical Exam:     Vital Signs:   Weight: 77 kg (169 lb 12.1 oz)  IBW: Ideal body weight: 47.8 kg (105 lb 6.1 oz)  Adjusted ideal body weight: 59.5 kg (131 lb 2.1 oz)  Temp:  [98.4 °F (36.9 °C)-99.9 °F (37.7 °C)] 98.4 °F (36.9 °C)  HR:  [100-135] 121  Resp:  [18-25] 22  BP: (119-139)/(61-85) 121/71  FiO2 (%):  [80] 80  General: NAD  Neuro: AxO 3  Heart: RRR  Lungs: Basilar crackles  Abdomen: Soft NT  Extremities: No edema                 Ventilator Settings:     Vent Mode: CPAP/PS Spont  FiO2 (%):  [80] 80          Invalid input(s): "PCO2", "O2"  Radiologic Images Reviewed:   CXR  IMPRESSION:     Persistent pulmonary edema with small effusions.          Input / Output:       Intake/Output Summary (Last 24 hours) at 10/12/2023 1203  Last data filed at 10/12/2023 0601  Gross per 24 hour   Intake 925 ml   Output 600 ml   Net 325 ml            Infusions:     Scheduled Medications:  Current Facility-Administered Medications   Medication Dose Route Frequency Provider Last Rate    acetaminophen  650 mg Oral Q6H PRN Stu Spencer MD      busPIRone  30 mg Oral TID Lucas Hill MD      chlorhexidine  15 mL Mouth/Throat Q12H [address] Tj MD      enoxaparin  40 mg Subcutaneous Q24H [address] Curry Peres MD      famotidine  20 mg Oral BID Lucas Hill MD      gabapentin  300 mg Oral TID Lucas Hill MD      guaiFENesin  200 mg Oral Q6H Trent Spencer MD      levalbuterol  1.25 mg Nebulization TID Stevan Gibbons MD      And    ipratropium  0.5 mg Nebulization TID Stevan Gibbons MD      nicotine  14 mg Transdermal Daily Vito Lesch, MD      Followed by    Mckenna Zeng ON 10/16/2023] nicotine  7 mg Transdermal Daily Vito Lesch, MD      ondansetron  4 mg Intravenous Q8H PRN Lucas Hill MD      polyethylene glycol  17 g Oral Daily PRN Lucas Hill MD      QUEtiapine  50 mg Oral HS RANDEE Ellis      senna  8.8 mg Per NG Tube HS Lucas Hill MD      sertraline  75 mg Per PEG Tube Daily RANDEE Ellis         PRN Medications:    acetaminophen    ondansetron    [COMPLETED] polyethylene glycol **FOLLOWED BY** polyethylene glycol    Labs Reviewed:  Results from last 7 days   Lab Units 10/12/23  0252 10/11/23  0615 10/10/23  0535   WBC Thousand/uL 17.32* 18.60* 15.42*   HEMOGLOBIN g/dL 7.6* 8.7* 7.8*   HEMATOCRIT % 24.9* 28.3* 26.1*   PLATELETS Thousands/uL 234 239 182      Results from last 7 days   Lab Units 10/12/23  0252 10/11/23  0615 10/10/23  0535 10/09/23  0536   SODIUM mmol/L 140 141 140 139   CO2 mmol/L 36* 36* 38* 39*   BUN mg/dL 37* 38* 51* 50*   CALCIUM mg/dL 9.1 9.6 9.5 9.7   MAGNESIUM mg/dL  --  2.0 2.1 2.1   PHOSPHORUS mg/dL 4.7* 4.7* 4.7* 4.3*         Invalid input(s): "ASTSGOT", "ALTSGPT"LABRCNTIP@ ,alkphos:3,tbilirubin:3,dbilirubin:3)@      Invalid input(s): "TROPT", "CPK", "PBNP"             I have personally seen and examined the patient on (10/12/23 between [id number]).  I discussed the patient with the AP/resident including, but not limited to, verifying findings; reviewing labs and x-rays; discussing with consultants; developing the plan of care with the bedside nurse; and discussing treatment plan with patient or surrogate. I have reviewed the note and assessment performed by the AP/resident and agree with the AP/resident’s documented findings and plan of care with the above additions/exceptions. Please see my comments for details and adjustments.

## 2023-10-13 ENCOUNTER — APPOINTMENT (INPATIENT)
Dept: CT IMAGING | Facility: HOSPITAL | Age: 68
DRG: 004 | End: 2023-10-13
Payer: COMMERCIAL

## 2023-10-13 LAB
ABO GROUP BLD: NORMAL
ALBUMIN SERPL BCP-MCNC: 2.5 G/DL (ref 3.5–5)
ALP SERPL-CCNC: 143 U/L (ref 34–104)
ALT SERPL W P-5'-P-CCNC: 27 U/L (ref 7–52)
ANION GAP SERPL CALCULATED.3IONS-SCNC: 5 MMOL/L
ANISOCYTOSIS BLD QL SMEAR: PRESENT
AST SERPL W P-5'-P-CCNC: 30 U/L (ref 13–39)
BASOPHILS # BLD MANUAL: 0 THOUSAND/UL (ref 0–0.1)
BASOPHILS NFR MAR MANUAL: 0 % (ref 0–1)
BILIRUB SERPL-MCNC: 0.29 MG/DL (ref 0.2–1)
BLD GP AB SCN SERPL QL: NEGATIVE
BUN SERPL-MCNC: 34 MG/DL (ref 5–25)
CA-I BLD-SCNC: 1.23 MMOL/L (ref 1.12–1.32)
CALCIUM ALBUM COR SERPL-MCNC: 10.5 MG/DL (ref 8.3–10.1)
CALCIUM SERPL-MCNC: 9.3 MG/DL (ref 8.4–10.2)
CHLORIDE SERPL-SCNC: 98 MMOL/L (ref 96–108)
CO2 SERPL-SCNC: 37 MMOL/L (ref 21–32)
CREAT SERPL-MCNC: 0.95 MG/DL (ref 0.6–1.3)
EOSINOPHIL # BLD MANUAL: 0 THOUSAND/UL (ref 0–0.4)
EOSINOPHIL NFR BLD MANUAL: 0 % (ref 0–6)
ERYTHROCYTE [DISTWIDTH] IN BLOOD BY AUTOMATED COUNT: 16.2 % (ref 11.6–15.1)
GFR SERPL CREATININE-BSD FRML MDRD: 60 ML/MIN/1.73SQ M
GLUCOSE SERPL-MCNC: 119 MG/DL (ref 65–140)
HCT VFR BLD AUTO: 24.1 % (ref 34.8–46.1)
HGB BLD-MCNC: 7.2 G/DL (ref 11.5–15.4)
HYPERCHROMIA BLD QL SMEAR: PRESENT
LDH SERPL-CCNC: 275 U/L (ref 140–271)
LG PLATELETS BLD QL SMEAR: PRESENT
LYMPHOCYTES # BLD AUTO: 0.96 THOUSAND/UL (ref 0.6–4.47)
LYMPHOCYTES # BLD AUTO: 6 % (ref 14–44)
MAGNESIUM SERPL-MCNC: 2.1 MG/DL (ref 1.9–2.7)
MCH RBC QN AUTO: 30 PG (ref 26.8–34.3)
MCHC RBC AUTO-ENTMCNC: 29.9 G/DL (ref 31.4–37.4)
MCV RBC AUTO: 100 FL (ref 82–98)
METAMYELOCYTES NFR BLD MANUAL: 1 % (ref 0–1)
MONOCYTES # BLD AUTO: 1.76 THOUSAND/UL (ref 0–1.22)
MONOCYTES NFR BLD: 11 % (ref 4–12)
MYELOCYTES NFR BLD MANUAL: 5 % (ref 0–1)
NEUTROPHILS # BLD MANUAL: 12.32 THOUSAND/UL (ref 1.85–7.62)
NEUTS BAND NFR BLD MANUAL: 12 % (ref 0–8)
NEUTS SEG NFR BLD AUTO: 65 % (ref 43–75)
NRBC BLD AUTO-RTO: 1 /100 WBC (ref 0–2)
PHOSPHATE SERPL-MCNC: 4.7 MG/DL (ref 2.3–4.1)
PLATELET # BLD AUTO: 331 THOUSANDS/UL (ref 149–390)
PLATELET BLD QL SMEAR: ADEQUATE
PMV BLD AUTO: 10.2 FL (ref 8.9–12.7)
PNEUMOCYSTIS PCR: NEGATIVE
POTASSIUM SERPL-SCNC: 4.7 MMOL/L (ref 3.5–5.3)
PROT SERPL-MCNC: 5.4 G/DL (ref 6.4–8.4)
RBC # BLD AUTO: 2.4 MILLION/UL (ref 3.81–5.12)
RBC MORPH BLD: PRESENT
RH BLD: POSITIVE
SODIUM SERPL-SCNC: 140 MMOL/L (ref 135–147)
SPECIMEN EXPIRATION DATE: NORMAL
URATE SERPL-MCNC: 3.8 MG/DL (ref 2–7.5)
WBC # BLD AUTO: 16 THOUSAND/UL (ref 4.31–10.16)

## 2023-10-13 PROCEDURE — 85027 COMPLETE CBC AUTOMATED: CPT | Performed by: NURSE PRACTITIONER

## 2023-10-13 PROCEDURE — 99233 SBSQ HOSP IP/OBS HIGH 50: CPT | Performed by: INTERNAL MEDICINE

## 2023-10-13 PROCEDURE — 94150 VITAL CAPACITY TEST: CPT

## 2023-10-13 PROCEDURE — 94640 AIRWAY INHALATION TREATMENT: CPT

## 2023-10-13 PROCEDURE — 83735 ASSAY OF MAGNESIUM: CPT | Performed by: NURSE PRACTITIONER

## 2023-10-13 PROCEDURE — 86901 BLOOD TYPING SEROLOGIC RH(D): CPT

## 2023-10-13 PROCEDURE — 94664 DEMO&/EVAL PT USE INHALER: CPT

## 2023-10-13 PROCEDURE — G1004 CDSM NDSC: HCPCS

## 2023-10-13 PROCEDURE — 94668 MNPJ CHEST WALL SBSQ: CPT

## 2023-10-13 PROCEDURE — 85007 BL SMEAR W/DIFF WBC COUNT: CPT | Performed by: NURSE PRACTITIONER

## 2023-10-13 PROCEDURE — 86900 BLOOD TYPING SEROLOGIC ABO: CPT

## 2023-10-13 PROCEDURE — 94669 MECHANICAL CHEST WALL OSCILL: CPT

## 2023-10-13 PROCEDURE — 84100 ASSAY OF PHOSPHORUS: CPT | Performed by: NURSE PRACTITIONER

## 2023-10-13 PROCEDURE — 99291 CRITICAL CARE FIRST HOUR: CPT | Performed by: STUDENT IN AN ORGANIZED HEALTH CARE EDUCATION/TRAINING PROGRAM

## 2023-10-13 PROCEDURE — 70491 CT SOFT TISSUE NECK W/DYE: CPT

## 2023-10-13 PROCEDURE — 82330 ASSAY OF CALCIUM: CPT

## 2023-10-13 PROCEDURE — 94760 N-INVAS EAR/PLS OXIMETRY 1: CPT

## 2023-10-13 PROCEDURE — 86923 COMPATIBILITY TEST ELECTRIC: CPT

## 2023-10-13 PROCEDURE — 30233N1 TRANSFUSION OF NONAUTOLOGOUS RED BLOOD CELLS INTO PERIPHERAL VEIN, PERCUTANEOUS APPROACH: ICD-10-PCS | Performed by: INTERNAL MEDICINE

## 2023-10-13 PROCEDURE — P9040 RBC LEUKOREDUCED IRRADIATED: HCPCS

## 2023-10-13 PROCEDURE — 86850 RBC ANTIBODY SCREEN: CPT

## 2023-10-13 PROCEDURE — 84550 ASSAY OF BLOOD/URIC ACID: CPT | Performed by: NURSE PRACTITIONER

## 2023-10-13 PROCEDURE — 83615 LACTATE (LD) (LDH) ENZYME: CPT | Performed by: NURSE PRACTITIONER

## 2023-10-13 PROCEDURE — 80053 COMPREHEN METABOLIC PANEL: CPT | Performed by: NURSE PRACTITIONER

## 2023-10-13 RX ORDER — OXYCODONE HCL 5 MG/5 ML
2.5 SOLUTION, ORAL ORAL EVERY 6 HOURS PRN
Status: DISCONTINUED | OUTPATIENT
Start: 2023-10-13 | End: 2023-12-04 | Stop reason: HOSPADM

## 2023-10-13 RX ORDER — GUAIFENESIN 100 MG/5ML
200 SOLUTION ORAL EVERY 6 HOURS PRN
Status: DISCONTINUED | OUTPATIENT
Start: 2023-10-13 | End: 2023-10-27

## 2023-10-13 RX ADMIN — IPRATROPIUM BROMIDE 0.5 MG: 0.5 SOLUTION RESPIRATORY (INHALATION) at 07:20

## 2023-10-13 RX ADMIN — GABAPENTIN 300 MG: 300 CAPSULE ORAL at 15:51

## 2023-10-13 RX ADMIN — FAMOTIDINE 20 MG: 20 TABLET, FILM COATED ORAL at 08:02

## 2023-10-13 RX ADMIN — ACYCLOVIR 400 MG: 200 CAPSULE ORAL at 08:03

## 2023-10-13 RX ADMIN — IPRATROPIUM BROMIDE 0.5 MG: 0.5 SOLUTION RESPIRATORY (INHALATION) at 20:30

## 2023-10-13 RX ADMIN — SULFAMETHOXAZOLE AND TRIMETHOPRIM 1 TABLET: 800; 160 TABLET ORAL at 08:02

## 2023-10-13 RX ADMIN — LEVALBUTEROL HYDROCHLORIDE 1.25 MG: 1.25 SOLUTION RESPIRATORY (INHALATION) at 07:21

## 2023-10-13 RX ADMIN — ENOXAPARIN SODIUM 40 MG: 40 INJECTION SUBCUTANEOUS at 08:02

## 2023-10-13 RX ADMIN — ETOPOSIDE: 20 INJECTION, SOLUTION INTRAVENOUS at 09:46

## 2023-10-13 RX ADMIN — BUSPIRONE HYDROCHLORIDE 30 MG: 10 TABLET ORAL at 21:17

## 2023-10-13 RX ADMIN — PREDNISONE 100 MG: 50 TABLET ORAL at 08:04

## 2023-10-13 RX ADMIN — LEVALBUTEROL HYDROCHLORIDE 1.25 MG: 1.25 SOLUTION RESPIRATORY (INHALATION) at 13:57

## 2023-10-13 RX ADMIN — OXYCODONE HYDROCHLORIDE 5 MG: 5 SOLUTION ORAL at 21:17

## 2023-10-13 RX ADMIN — IPRATROPIUM BROMIDE 0.5 MG: 0.5 SOLUTION RESPIRATORY (INHALATION) at 13:57

## 2023-10-13 RX ADMIN — LEVALBUTEROL HYDROCHLORIDE 1.25 MG: 1.25 SOLUTION RESPIRATORY (INHALATION) at 20:30

## 2023-10-13 RX ADMIN — BUSPIRONE HYDROCHLORIDE 30 MG: 10 TABLET ORAL at 15:51

## 2023-10-13 RX ADMIN — GABAPENTIN 300 MG: 300 CAPSULE ORAL at 21:17

## 2023-10-13 RX ADMIN — PREDNISONE 100 MG: 50 TABLET ORAL at 17:09

## 2023-10-13 RX ADMIN — SODIUM CHLORIDE SOLN NEBU 3% 4 ML: 3 NEBU SOLN at 13:57

## 2023-10-13 RX ADMIN — IOHEXOL 85 ML: 350 INJECTION, SOLUTION INTRAVENOUS at 14:46

## 2023-10-13 RX ADMIN — GUAIFENESIN 200 MG: 200 SOLUTION ORAL at 03:15

## 2023-10-13 RX ADMIN — CHLORHEXIDINE GLUCONATE 15 ML: 1.2 SOLUTION ORAL at 08:02

## 2023-10-13 RX ADMIN — FLUCONAZOLE 200 MG: 100 TABLET ORAL at 08:02

## 2023-10-13 RX ADMIN — GABAPENTIN 300 MG: 300 CAPSULE ORAL at 08:02

## 2023-10-13 RX ADMIN — OXYCODONE HYDROCHLORIDE 5 MG: 5 SOLUTION ORAL at 03:49

## 2023-10-13 RX ADMIN — GUAIFENESIN 200 MG: 200 SOLUTION ORAL at 08:02

## 2023-10-13 RX ADMIN — OXYCODONE HYDROCHLORIDE 5 MG: 5 SOLUTION ORAL at 12:30

## 2023-10-13 RX ADMIN — FOSAPREPITANT DIMEGLUMINE 150 MG: 150 INJECTION, POWDER, LYOPHILIZED, FOR SOLUTION INTRAVENOUS at 08:32

## 2023-10-13 RX ADMIN — LEVOFLOXACIN 250 MG: 250 TABLET, FILM COATED ORAL at 08:02

## 2023-10-13 RX ADMIN — FAMOTIDINE 20 MG: 20 TABLET, FILM COATED ORAL at 17:10

## 2023-10-13 RX ADMIN — Medication 8.8 MG: at 21:18

## 2023-10-13 RX ADMIN — SODIUM CHLORIDE SOLN NEBU 3% 4 ML: 3 NEBU SOLN at 07:21

## 2023-10-13 RX ADMIN — ONDANSETRON: 2 INJECTION INTRAMUSCULAR; INTRAVENOUS at 08:16

## 2023-10-13 RX ADMIN — SODIUM CHLORIDE SOLN NEBU 3% 4 ML: 3 NEBU SOLN at 03:34

## 2023-10-13 RX ADMIN — QUETIAPINE FUMARATE 50 MG: 25 TABLET ORAL at 21:17

## 2023-10-13 RX ADMIN — BUSPIRONE HYDROCHLORIDE 30 MG: 10 TABLET ORAL at 08:03

## 2023-10-13 RX ADMIN — IPRATROPIUM BROMIDE 0.5 MG: 0.5 SOLUTION RESPIRATORY (INHALATION) at 03:34

## 2023-10-13 RX ADMIN — ACYCLOVIR 400 MG: 200 CAPSULE ORAL at 17:10

## 2023-10-13 RX ADMIN — SODIUM CHLORIDE SOLN NEBU 3% 4 ML: 3 NEBU SOLN at 20:30

## 2023-10-13 RX ADMIN — CHLORHEXIDINE GLUCONATE 15 ML: 1.2 SOLUTION ORAL at 21:18

## 2023-10-13 RX ADMIN — LEVALBUTEROL HYDROCHLORIDE 1.25 MG: 1.25 SOLUTION RESPIRATORY (INHALATION) at 03:34

## 2023-10-13 RX ADMIN — NICOTINE 14 MG: 14 PATCH, EXTENDED RELEASE TRANSDERMAL at 08:02

## 2023-10-13 RX ADMIN — SERTRALINE 75 MG: 25 TABLET, FILM COATED ORAL at 08:02

## 2023-10-13 NOTE — PROGRESS NOTES
[id number] [facility]  Progress Note: Critical Care  Name: Pricila Baig 77 y.o. female I MRN: [id number]  Unit/Bed#: ICU 03 I Date of Admission: 9/13/2023   Date of Service: 10/13/2023 I Hospital Day: 30    Assessment/Plan   * Acute respiratory failure with hypoxia secondary to oropharyngeal mass  Assessment & Plan  Cuffless trach placed 9/17, transitioned to cuffed on 10/3. Oxygen requirements not improving. For mental health patient is on buspirone, quetiapine, and sertraline. For pain, gabapentin, oxycodone scheduled and prn, and hydromorphone prn. On Guaifenesin, Atrovent and Xopenex for airway maintenance. No improvement in bilateral consolidation or atelectasis and groundglass opacities on repeat CT and chest x-rays. No change since bronchoscopy. Patient was not receiving proper positive pressure to open up atelectatic lung. Less crackles on exam. Bronchial cx with 3 colonies CoNS. PCP PCR invalid, repeat negative     Plan:  Attempt to wean O2 requirements-maintain cuff overnight from 9 pm to 6 am for positive pressure, can open cuff during the day for patient to speak  Viral culture from bronchoscopy pending, Trach aspirate results pending  Repeat Bcx if febrile    Large cell lymphoma [facility])  Assessment & Plan  Biopsy on 9/17: Large B-cell lymphoma, stage II. First inpatient chemotherapy 9/22/2023- with R-EPOCH. 9/27 Rituxan. 9/28 Filgrastim. acyclovir, Zyloprim, Diflucan, Levaquin, Zofran, Bactrim for chemo prophylaxis, all discontinued with heme-onc's approval as Nafisa reached 4200. Todays CBC WBC up  18.6.   I suspect leukocytosis is secondary to the filgrastim, but given low grade fevers infection is also a possibility    Plan:  Inpatient and outpatient hematology oncology following, next inpatient [facility] cycle starts today, restarting ppx  1 unit pRBC today as Hgb>8 needed for chemo  See acute respiratory failure above    Oropharyngeal mass  Assessment & Plan  9/14 Neck/ soft tissue CT: Large enhancing soft tissue mass identified within the left neck involving multiple spaces. Centered within the left oropharynx with extensions as described above into multiple spaces. This results in significant airway compromise. 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 Small level 3/level 4 nodes are seen within the neck. Tracheostomy by ENT, bx & addition testing performed. Replaced on 9/26. H/o tobacco abuse, daily smoker. On nicotine while inpatient, confirmed with patient she needs nicotine patch    Plan:  ENT and heme onc following  Will titrate NRT weekly  CT soft tissue neck  See acute respiratory failure and large B cell lymphoma above      (HFpEF) heart failure with preserved ejection fraction [facility])  Assessment & Plan  Wt Readings from Last 3 Encounters:   10/13/23 77.6 kg (171 lb 1.2 oz)   09/07/23 74.6 kg (164 lb 6.4 oz)   08/30/23 73.3 kg (161 lb 9.6 oz)     Lab Results   Component Value Date    LVEF 60 08/28/2023     (H) 09/29/2023     (H) 09/13/2023     Echo 8/28/23: LVEF 60%. CXR 9/22: Persistent pulmonary venous congestion with small effusions and bibasilar atelectasis. Baseline creatinine around 0.8, PO resolved Cr down to 0.95 today. Plan:  BMP, magnesium; Goal Mg > 2 and K > 4; Replete prn  Measure I/O        Angioedema  Assessment & Plan  POA in [place] ED: Swollen tongue, swelling soft and hard palate and almost the head of all phalanx is completely closed. Severe angioedema. Decadron 10 mg, Benadryl 25 mg given. Initially refused but eventually agreed with intubation. 2/2 oropharyngeal mass compression. Plan:  Cuffless trach placed 9/17, transitioned to cuffed 10/3  Tolerating trach   See acute respiratory failure with hypoxia and oropharyngeal mass above    Ambulatory dysfunction  Assessment & Plan  Impacted by chronic pain syndrome, but medication regimen may also contribute.   Patient currently requires tracheostomy with ICU level care and will be inpatient for R-EPOCH cycle 2 starting today 10/13. Will require PT/ OT eval before discharge. Pain syndrome, chronic  Assessment & Plan  Home regimen: Oxycodone 5 mg twice daily. Tylenol 650 mg every 8 hours prn. Gabapentin 600 mg 3 times daily. Medical marijuana. Lumbar radiculopathy and impaired ambulatory dysfunction second to pain. Patient is taking opioids regularly for pain, has bowel regimen and is having bowel movements daily. Plan:  Continue gabapentin 300 mg TID, oxycodone 5 mg TID, prn Tylenol 650 mg q6. Benign essential hypertension  Assessment & Plan  Takes Coreg 12.5 mg BID, Amlodipine 10 mg daily, and lisinopril 40 mg daily all discontinued at this time secondary to hypotension and PO. but stable currently without any antihypertensives. Systolic persistently elevated and tachycardic, potentially secondary to pain    Plan:  Monitor vitals  Hold coreg while on [facility], can restart amlodipine if hypertensive    Depression  Assessment & Plan  Patient on Zoloft 75 daily and Seroquel evening. Patient is depressed, family met with palliative on 10/5 for goals of care. Patient is firm that she wants to live and to fight, she is just tired. Generalized abdominal pain  Assessment & Plan  Patient reports abdominal pain which is unchanged since 9/22 no guarding on exam. Takes Famotidine for GERD at home. Alk phos 10/2 145, 10/13 143    - Continue Famotidine  - On bowel regimen with Senna and Miralax prn    Chronic Superficial thrombophlebitis  Assessment & Plan  Right forearm soreness. BUE ultrasound in setting of high risk of DVT. RUE US[de-identified] No evidence of acute or chronic deep vein thrombosis. 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 No longer requiring renal dosing as PO resolved    -Continue DVT ppx with Lovenox 40    Blister (nonthermal) of left forearm, sequela  Assessment & Plan  Blisters of LUE healing, no signs of infection.   Continue daily wound care    CKD (chronic kidney disease) stage 3, GFR 30-59 ml/min [facility])  Assessment & Plan  Lab Results   Component Value Date    EGFR 60 10/13/2023    EGFR 67 10/12/2023    EGFR 56 10/12/2023    CREATININE 0.95 10/13/2023    CREATININE 0.87 10/12/2023    CREATININE 1.01 10/12/2023     Baseline Cr appears to be between 0.75-1.1. Patient received 2 doses of Bumex IV 2 g as she had not been responding appropriately to IV 40 Lasix daily. Creatinine went up by 0.5 meeting criteria for an PO. This may be prerenal intrinsic or postrenal.  Potential prerenal causes include reduced kidney perfusion due to lisinopril. Intrinsic can also be lisinopril due to ATN, AIN from chemo medications, TLS, crystaline nephropathy from acyclovir, rituxan nephrotoxicity, filgrastim glomerulonephritis. Post renal obstructive could be from urine retention from vincristine, cyclophosphamide bladder fibrosis. Suspect most likely due to medications as a combination of prerenal and intrinsic. Cr up to 2 today. FENa was 0.2%, UA shows no casts or signs of infection, urine eosinophils 0%. Patient likely has prerenal PO from volume depletion. Received 2 L NS and 1 pRBC and cr went up by 0.07 still and Na and Cl went down, suspect that volume prevented further cr rise and free water excretion impaired by kidney function, allowing hyponatremia to increase. Creatinine increase is likely plateaued. Went down today. Patient did receive 20 of Lasix overnight for volume overload which she responded well to. Suspect that now that nephrotoxic medications have been stopped she will continue to respond well to Lasix and creatinine will continue to downtrend. PO now resolved. Will be cautious about nephrotoxins and monitor as restarting EPOCH and ppx          ICU Core Measures     A: Assess, Prevent, and Manage Pain Has pain been assessed? Yes  Need for changes to pain regimen?  No   B: Both SAT/SAT  N/A   C: Choice of Sedation RASS Goal: 0 Alert and Calm or N/A patient not on sedation  Need for changes to sedation or analgesia regimen? No   D: Delirium CAM-ICU: Negative   E: Early Mobility  Plan for early mobility? No   F: Family Engagement Plan for family engagement today? No       Antibiotic Review: Continue broad spectrum secondary to severity of illness. Review of Invasive Devices:            Prophylaxis:  VTE VTE covered by:  enoxaparin, Subcutaneous, 40 mg at 10/12/23 0810       Stress Ulcer  covered byfamotidine (PEPCID) oral suspension 40 mg [[id number]], pantoprazole (PROTONIX) injection 40 mg [[id number]]          Subjective   HPI/24hr events:   [place] cycle 2 starting today. She consented to receive pRBC unit this morning. She is nervous and frustrated but denies additional pain. Review of Systems   Constitutional:  Negative for fever. Respiratory:  Negative for shortness of breath and wheezing. Cardiovascular:  Negative for chest pain, palpitations and leg swelling. Gastrointestinal:  Positive for nausea. Genitourinary:  Negative for dysuria. Objective                            Vitals I/O      Most Recent Min/Max in 24hrs   Temp 99.3 °F (37.4 °C) Temp  Min: 98.4 °F (36.9 °C)  Max: 100.3 °F (37.9 °C)   Pulse (!) 114 Pulse  Min: 114  Max: 122   Resp 15 Resp  Min: 15  Max: 30   /56 BP  Min: 90/52  Max: 164/92   O2 Sat 93 % SpO2  Min: 84 %  Max: 98 %      Intake/Output Summary (Last 24 hours) at 10/13/2023 0801  Last data filed at 10/13/2023 0600  Gross per 24 hour   Intake 2059 ml   Output 500 ml   Net 1559 ml         Diet Enteral/Parenteral; Tube Feeding No Oral Diet; Two Telly HN; Continuous; 38; 190; Water; Every 4 hours     Invasive Monitoring Physical exam    Physical Exam  Constitutional:       General: She is not in acute distress. HENT:      Head: Normocephalic and atraumatic. Eyes:      Extraocular Movements: Extraocular movements intact. Cardiovascular:      Rate and Rhythm: Regular rhythm. Tachycardia present.       Heart sounds: Normal heart sounds. No murmur heard. Pulmonary:      Effort: Pulmonary effort is normal.      Breath sounds: Rales present. No wheezing. Abdominal:      Palpations: Abdomen is soft. Tenderness: There is abdominal tenderness. There is no guarding. Musculoskeletal:      Right lower leg: No edema. Left lower leg: No edema. Skin:     Coloration: Skin is not jaundiced or pale. Neurological:      Mental Status: She is alert and oriented to person, place, and time.            Diagnostic Studies      Imaging: CT CAP contrast pending read I have personally reviewed pertinent films in PACS     Medications:  Scheduled PRN   acyclovir, 400 mg, BID  busPIRone, 30 mg, TID  chlorhexidine, 15 mL, Q12H [address]  [START ON 10/17/2023] cyclophosphamide (CYTOXAN) 1,350 mg in sodium chloride 0.9 % 250 mL IVPB, 750 mg/m2 (Treatment Plan Recorded), Once  DOXOrubicin (ADRIAMYCIN) 18 mg, etoposide (TOPOSAR) 67.6 mg, vinCRIStine (ONCOVIN) 0.7 mg in sodium chloride 0.9 % 500 mL infusion, , Q24H  enoxaparin, 40 mg, Q24H ASHLEY  famotidine, 20 mg, BID  fluconazole, 200 mg, Daily  fosaprepitant (EMEND) 150 mg in sodium chloride 0.9 % 250 mL IVPB, 150 mg, Once  gabapentin, 300 mg, TID  guaiFENesin, 200 mg, Q6H  levalbuterol, 1.25 mg, TID   And  ipratropium, 0.5 mg, TID  levofloxacin, 250 mg, Q24H [address]  nicotine, 14 mg, Daily   Followed by  Amy Gallego ON 10/16/2023] nicotine, 7 mg, Daily  ondansetron (ZOFRAN) 16 mg, dexamethasone (DECADRON) 10 mg in sodium chloride 0.9 % 50 mL IVPB, , Q24H  oxyCODONE, 5 mg, Q8H  predniSONE, 60 mg/m2 (Treatment Plan Recorded), BID With Meals  QUEtiapine, 50 mg, HS  senna, 8.8 mg, HS  sertraline, 75 mg, Daily  [START ON 10/17/2023] sodium chloride, 2,000 mL, Once  sodium chloride, 4 mL, TID  sulfamethoxazole-trimethoprim, 1 tablet, Once per day on Mon Wed Fri      acetaminophen, 650 mg, Q6H PRN  alteplase, 2 mg, Q1MIN PRN  ondansetron, 4 mg, Q8H PRN  ondansetron, 4 mg, Q8H PRN  polyethylene glycol, 17 g, Daily PRN  sodium chloride, 20 mL/hr, Daily PRN       Continuous          Labs:    CBC    Recent Labs     10/12/23  0252 10/12/23  1758 10/13/23  0355   WBC 17.32* 18.30* 16.00*   HGB 7.6* 8.3* 7.2*   HCT 24.9* 27.2* 24.1*    327 331   BANDSPCT 7  --  12*     BMP    Recent Labs     10/12/23  1758 10/13/23  0355   SODIUM 139 140   K 4.4 4.7   CL 96 98   CO2 37* 37*   AGAP 6 5   BUN 32* 34*   CREATININE 0.87 0.95   CALCIUM 9.5 9.3       Coags    No recent results     Additional Electrolytes  Recent Labs     10/12/23  0252 10/13/23  0355   MG  --  2.1   PHOS 4.7* 4.7*          Blood Gas    No recent results  No recent results LFTs  Recent Labs     10/12/23  1758 10/13/23  0355   ALT 28 27   AST 26 30   ALKPHOS 168* 143*   ALB 2.7* 2.5*   TBILI 0.37 0.29       Infectious  Recent Labs     10/12/23  0252   PROCALCITONI 0.23     Glucose  Recent Labs     10/12/23  0252 10/12/23  1758 10/13/23  0355   GLUC [id number] Alexx Castro MD

## 2023-10-13 NOTE — PROGRESS NOTES
Progress Note - Shantal Rapp 77 y.o. female MRN: [id number]    Unit/Bed#: ICU 03 Encounter: [id number]      Assessment:  Ms. 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 Plan:  B cell aggressive lymphoma with MYC and Bcl-2  Tolerated cycle 1 of dose adjusted R-EPOCH with cycle 2 starting today. Continue to monitor daily CBC with differential, comprehensive metabolic panel, tumor lysis labs. Continue supportive care while getting chemotherapy. We will transfuse for hemoglobin less than 7 and platelets less than 64,743. We will start growth factor support for neutrophils after she completes her chemotherapy regimen next week. We will continue to follow but please call with issues or concerns. Anemia  Continue to monitor daily CBCs. Received blood transfusion this morning prior to receiving chemotherapy. May need additional transfusions. Would recommend transfusion for hemoglobin less than 7 and platelets less than 01,540. Subjective:   Patient seen and examined this morning. Was getting blood at the time. Otherwise feeling good and no complaints this morning. Remains on the trach. Objective:     Vitals: Blood pressure 145/86, pulse (!) 114, temperature 98.3 °F (36.8 °C), temperature source Oral, resp. rate 16, height 5' 1" (1.549 m), weight 77.6 kg (171 lb 1.2 oz), SpO2 91 %. ,Body mass index is 32.32 kg/m². Intake/Output Summary (Last 24 hours) at 10/13/2023 1456  Last data filed at 10/13/2023 1001  Gross per 24 hour   Intake 2661 ml   Output 750 ml   Net 1911 ml       Physical Exam: /86 (BP Location: Left arm)   Pulse (!) 114   Temp 98.3 °F (36.8 °C) (Oral)   Resp 16   Ht 5' 1" (1.549 m) Comment: Simultaneous filing. User may not have seen previous data. Wt 77.6 kg (171 lb 1.2 oz) Comment: Simultaneous filing. User may not have seen previous data.   SpO2 (!) 88%   BMI 32.32 kg/m²     General Appearance:    Alert, cooperative, no distress, appears stated age   Head:    Normocephalic, without obvious abnormality, atraumatic   Eyes:    PERRL, conjunctiva/corneas clear both eyes   Ears:    Normal TM's and external ear canals, both ears   Nose:   Nares normal, septum midline, mucosa normal, no drainage    or sinus tenderness   Throat:   Lips, mucosa, and tongue normal; teeth and gums normal   Neck:   + trach   Back:     Symmetric, no curvature, ROM normal, no CVA tenderness   Abdomen:     Non distended   Extremities:   Extremities normal, atraumatic, no cyanosis or edema   Pulses:   2+ and symmetric all extremities   Skin:   Skin color, texture, turgor normal, no rashes or lesions   Neurologic:   CNII-XII intact, normal strength, sensation and reflexes     throughout        Invasive Devices       Peripherally Inserted Central Catheter Line  Duration             PICC Line 38/50/98 Right Basilic 22 days              Drain  Duration             Gastrostomy/Enterostomy Percutaneous Endoscopic Gastrostomy (PEG) 16 Fr. LUQ 16 days              Airway  Duration             Surgical Airway Shiley Cuffed 26 days                    Lab, Imaging and other studies: I have personally reviewed pertinent reports.     VTE Pharmacologic Prophylaxis: Enoxaparin (Lovenox)  VTE Mechanical Prophylaxis: sequential compression device      Marguerite Morocho MD, PhD

## 2023-10-14 LAB
ABO GROUP BLD BPU: NORMAL
ALBUMIN SERPL BCP-MCNC: 2.8 G/DL (ref 3.5–5)
ALP SERPL-CCNC: 156 U/L (ref 34–104)
ALT SERPL W P-5'-P-CCNC: 32 U/L (ref 7–52)
ANION GAP SERPL CALCULATED.3IONS-SCNC: 6 MMOL/L
ANISOCYTOSIS BLD QL SMEAR: PRESENT
AST SERPL W P-5'-P-CCNC: 29 U/L (ref 13–39)
BACTERIA SPT RESP CULT: NORMAL
BASOPHILS # BLD MANUAL: 0 THOUSAND/UL (ref 0–0.1)
BASOPHILS NFR MAR MANUAL: 0 % (ref 0–1)
BILIRUB SERPL-MCNC: 0.3 MG/DL (ref 0.2–1)
BPU ID: NORMAL
BUN SERPL-MCNC: 35 MG/DL (ref 5–25)
CALCIUM ALBUM COR SERPL-MCNC: 10.5 MG/DL (ref 8.3–10.1)
CALCIUM SERPL-MCNC: 9.5 MG/DL (ref 8.4–10.2)
CHLORIDE SERPL-SCNC: 100 MMOL/L (ref 96–108)
CO2 SERPL-SCNC: 33 MMOL/L (ref 21–32)
CREAT SERPL-MCNC: 0.97 MG/DL (ref 0.6–1.3)
CROSSMATCH: NORMAL
EOSINOPHIL # BLD MANUAL: 0 THOUSAND/UL (ref 0–0.4)
EOSINOPHIL NFR BLD MANUAL: 0 % (ref 0–6)
ERYTHROCYTE [DISTWIDTH] IN BLOOD BY AUTOMATED COUNT: 17 % (ref 11.6–15.1)
GFR SERPL CREATININE-BSD FRML MDRD: 59 ML/MIN/1.73SQ M
GIANT PLATELETS BLD QL SMEAR: PRESENT
GLUCOSE SERPL-MCNC: 137 MG/DL (ref 65–140)
GRAM STN SPEC: NORMAL
HCT VFR BLD AUTO: 26.7 % (ref 34.8–46.1)
HGB BLD-MCNC: 8.4 G/DL (ref 11.5–15.4)
HYPERCHROMIA BLD QL SMEAR: PRESENT
LDH SERPL-CCNC: 286 U/L (ref 140–271)
LG PLATELETS BLD QL SMEAR: PRESENT
LYMPHOCYTES # BLD AUTO: 0.41 THOUSAND/UL (ref 0.6–4.47)
LYMPHOCYTES # BLD AUTO: 2 % (ref 14–44)
MAGNESIUM SERPL-MCNC: 2.2 MG/DL (ref 1.9–2.7)
MCH RBC QN AUTO: 30.4 PG (ref 26.8–34.3)
MCHC RBC AUTO-ENTMCNC: 31.5 G/DL (ref 31.4–37.4)
MCV RBC AUTO: 97 FL (ref 82–98)
METAMYELOCYTES NFR BLD MANUAL: 4 % (ref 0–1)
MONOCYTES # BLD AUTO: 1.42 THOUSAND/UL (ref 0–1.22)
MONOCYTES NFR BLD: 7 % (ref 4–12)
MYELOCYTES NFR BLD MANUAL: 3 % (ref 0–1)
NEUTROPHILS # BLD MANUAL: 17.01 THOUSAND/UL (ref 1.85–7.62)
NEUTS BAND NFR BLD MANUAL: 7 % (ref 0–8)
NEUTS SEG NFR BLD AUTO: 77 % (ref 43–75)
PHOSPHATE SERPL-MCNC: 4.1 MG/DL (ref 2.3–4.1)
PLATELET # BLD AUTO: 460 THOUSANDS/UL (ref 149–390)
PLATELET BLD QL SMEAR: ABNORMAL
PMV BLD AUTO: 9.9 FL (ref 8.9–12.7)
POLYCHROMASIA BLD QL SMEAR: PRESENT
POTASSIUM SERPL-SCNC: 5.1 MMOL/L (ref 3.5–5.3)
PROT SERPL-MCNC: 6 G/DL (ref 6.4–8.4)
RBC # BLD AUTO: 2.76 MILLION/UL (ref 3.81–5.12)
RBC MORPH BLD: PRESENT
RESULT:: 35 PG/ML
SODIUM SERPL-SCNC: 139 MMOL/L (ref 135–147)
UNIT DISPENSE STATUS: NORMAL
UNIT PRODUCT CODE: NORMAL
UNIT PRODUCT VOLUME: 350 ML
UNIT RH: NORMAL
URATE SERPL-MCNC: 3.9 MG/DL (ref 2–7.5)
WBC # BLD AUTO: 20.25 THOUSAND/UL (ref 4.31–10.16)

## 2023-10-14 PROCEDURE — 94668 MNPJ CHEST WALL SBSQ: CPT

## 2023-10-14 PROCEDURE — 94760 N-INVAS EAR/PLS OXIMETRY 1: CPT

## 2023-10-14 PROCEDURE — 84100 ASSAY OF PHOSPHORUS: CPT

## 2023-10-14 PROCEDURE — 83735 ASSAY OF MAGNESIUM: CPT

## 2023-10-14 PROCEDURE — 80053 COMPREHEN METABOLIC PANEL: CPT | Performed by: INTERNAL MEDICINE

## 2023-10-14 PROCEDURE — 84550 ASSAY OF BLOOD/URIC ACID: CPT | Performed by: INTERNAL MEDICINE

## 2023-10-14 PROCEDURE — 94640 AIRWAY INHALATION TREATMENT: CPT

## 2023-10-14 PROCEDURE — 94669 MECHANICAL CHEST WALL OSCILL: CPT

## 2023-10-14 PROCEDURE — 94664 DEMO&/EVAL PT USE INHALER: CPT

## 2023-10-14 PROCEDURE — 83615 LACTATE (LD) (LDH) ENZYME: CPT | Performed by: INTERNAL MEDICINE

## 2023-10-14 PROCEDURE — 85007 BL SMEAR W/DIFF WBC COUNT: CPT | Performed by: INTERNAL MEDICINE

## 2023-10-14 PROCEDURE — 85027 COMPLETE CBC AUTOMATED: CPT | Performed by: INTERNAL MEDICINE

## 2023-10-14 PROCEDURE — 99291 CRITICAL CARE FIRST HOUR: CPT | Performed by: STUDENT IN AN ORGANIZED HEALTH CARE EDUCATION/TRAINING PROGRAM

## 2023-10-14 PROCEDURE — 94150 VITAL CAPACITY TEST: CPT

## 2023-10-14 PROCEDURE — 94003 VENT MGMT INPAT SUBQ DAY: CPT

## 2023-10-14 RX ADMIN — FAMOTIDINE 20 MG: 20 TABLET, FILM COATED ORAL at 08:49

## 2023-10-14 RX ADMIN — IPRATROPIUM BROMIDE 0.5 MG: 0.5 SOLUTION RESPIRATORY (INHALATION) at 20:26

## 2023-10-14 RX ADMIN — IPRATROPIUM BROMIDE 0.5 MG: 0.5 SOLUTION RESPIRATORY (INHALATION) at 13:45

## 2023-10-14 RX ADMIN — ENOXAPARIN SODIUM 40 MG: 40 INJECTION SUBCUTANEOUS at 08:49

## 2023-10-14 RX ADMIN — PREDNISONE 100 MG: 50 TABLET ORAL at 16:08

## 2023-10-14 RX ADMIN — SODIUM CHLORIDE SOLN NEBU 3% 4 ML: 3 NEBU SOLN at 13:45

## 2023-10-14 RX ADMIN — PREDNISONE 100 MG: 50 TABLET ORAL at 08:48

## 2023-10-14 RX ADMIN — QUETIAPINE FUMARATE 50 MG: 25 TABLET ORAL at 21:13

## 2023-10-14 RX ADMIN — CHLORHEXIDINE GLUCONATE 15 ML: 1.2 SOLUTION ORAL at 08:48

## 2023-10-14 RX ADMIN — GABAPENTIN 300 MG: 300 CAPSULE ORAL at 08:49

## 2023-10-14 RX ADMIN — CHLORHEXIDINE GLUCONATE 15 ML: 1.2 SOLUTION ORAL at 21:14

## 2023-10-14 RX ADMIN — LEVALBUTEROL HYDROCHLORIDE 1.25 MG: 1.25 SOLUTION RESPIRATORY (INHALATION) at 07:41

## 2023-10-14 RX ADMIN — OXYCODONE HYDROCHLORIDE 5 MG: 5 SOLUTION ORAL at 12:53

## 2023-10-14 RX ADMIN — BUSPIRONE HYDROCHLORIDE 30 MG: 10 TABLET ORAL at 08:49

## 2023-10-14 RX ADMIN — ONDANSETRON: 2 INJECTION INTRAMUSCULAR; INTRAVENOUS at 07:47

## 2023-10-14 RX ADMIN — NICOTINE 14 MG: 14 PATCH, EXTENDED RELEASE TRANSDERMAL at 08:49

## 2023-10-14 RX ADMIN — FAMOTIDINE 20 MG: 20 TABLET, FILM COATED ORAL at 19:00

## 2023-10-14 RX ADMIN — GABAPENTIN 300 MG: 300 CAPSULE ORAL at 16:09

## 2023-10-14 RX ADMIN — SODIUM CHLORIDE SOLN NEBU 3% 4 ML: 3 NEBU SOLN at 20:26

## 2023-10-14 RX ADMIN — SODIUM CHLORIDE SOLN NEBU 3% 4 ML: 3 NEBU SOLN at 07:40

## 2023-10-14 RX ADMIN — IPRATROPIUM BROMIDE 0.5 MG: 0.5 SOLUTION RESPIRATORY (INHALATION) at 07:41

## 2023-10-14 RX ADMIN — FLUCONAZOLE 200 MG: 100 TABLET ORAL at 08:48

## 2023-10-14 RX ADMIN — ACYCLOVIR 400 MG: 200 CAPSULE ORAL at 18:58

## 2023-10-14 RX ADMIN — BUSPIRONE HYDROCHLORIDE 30 MG: 10 TABLET ORAL at 16:08

## 2023-10-14 RX ADMIN — ACYCLOVIR 400 MG: 200 CAPSULE ORAL at 08:49

## 2023-10-14 RX ADMIN — OXYCODONE HYDROCHLORIDE 5 MG: 5 SOLUTION ORAL at 04:41

## 2023-10-14 RX ADMIN — OXYCODONE HYDROCHLORIDE 5 MG: 5 SOLUTION ORAL at 21:13

## 2023-10-14 RX ADMIN — LEVALBUTEROL HYDROCHLORIDE 1.25 MG: 1.25 SOLUTION RESPIRATORY (INHALATION) at 13:45

## 2023-10-14 RX ADMIN — Medication 8.8 MG: at 21:14

## 2023-10-14 RX ADMIN — GABAPENTIN 300 MG: 300 CAPSULE ORAL at 21:14

## 2023-10-14 RX ADMIN — ETOPOSIDE: 20 INJECTION, SOLUTION INTRAVENOUS at 09:44

## 2023-10-14 RX ADMIN — LEVALBUTEROL HYDROCHLORIDE 1.25 MG: 1.25 SOLUTION RESPIRATORY (INHALATION) at 20:25

## 2023-10-14 RX ADMIN — BUSPIRONE HYDROCHLORIDE 30 MG: 10 TABLET ORAL at 21:14

## 2023-10-14 RX ADMIN — LEVOFLOXACIN 250 MG: 250 TABLET, FILM COATED ORAL at 08:57

## 2023-10-14 RX ADMIN — SERTRALINE 75 MG: 25 TABLET, FILM COATED ORAL at 08:48

## 2023-10-14 NOTE — PROGRESS NOTES
[id number] [facility]  Progress Note: Critical Care  Name: Regis Jaeger 77 y.o. female I MRN: [id number]  Unit/Bed#: ICU 03 I Date of Admission: 9/13/2023   Date of Service: 10/14/2023 I Hospital Day: 32    Assessment/Plan   * Acute respiratory failure with hypoxia secondary to oropharyngeal mass  Assessment & Plan  Cuffless trach placed 9/17, transitioned to cuffed on 10/3. Oxygen requirements not improving. For mental health patient is on buspirone, quetiapine, and sertraline. For pain, gabapentin, oxycodone scheduled and prn, prn oxycodone mainly utilized for increased pain during chemo and after a bronch. On Guaifenesin, Atrovent and Xopenex for airway maintenance. No improvement in bilateral consolidation or atelectasis and groundglass opacities on repeat CT and chest x-rays. Patient not receiving proper positive pressure to open up atelectatic lung. Bronchial cx with 3 colonies CoNS. PCP PCR invalid, repeat negative. CTCAP indicates additional groundglass opacity with paraseptal emphysema, which may be contributing to inability to remain off vent. SBTs have been unsuccessful to place patient on trach collar since return to ICU     Plan:  Maintain cuff overnight from 9 pm to 6 am for positive pressure, can open cuff during the day for patient to speak, consider bipap  Fungitel, galactomannan, Fcx pending  Repeat Bcx if febrile  Consider repeat bronch for DAH as Hgb dropping again    Large cell lymphoma ([address])  Assessment & Plan  Large B-cell lymphoma, stage II. First chemo cycle 9/22 4 days of R-EPOCH. 9/27 Rituxan. 9/28 Filgrastim. Acyclovir, Zyloprim, Diflucan, Levaquin, Zofran, Bactrim for chemo prophylaxis, all discontinued with heme-onc's approval as Nafisa reached 4200. I suspect leukocytosis is secondary to the filgrastim. Restarted all ppx 10/13 for [facility] cycle 2 which will run for the next 4 days.  Ppx and filgrastim can be discontinued when patient is no longer neutropenic after this cycle again. Plan:  Keep Hgb>7 and Plt>20 for chemo  See acute respiratory failure above    Oropharyngeal mass  Assessment & Plan  9/14 Neck/ soft tissue CT: Large enhancing soft tissue mass identified within the left neck involving multiple spaces. Centered within the left oropharynx with extensions as described above into multiple spaces. This results in significant airway compromise. 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 Small level 3/level 4 nodes are seen within the neck. Tracheostomy by ENT, bx & addition testing performed. Replaced on 9/26. H/o tobacco abuse, daily smoker. On nicotine while inpatient, confirmed with patient she needs nicotine patch. CT soft tissue neck shows reduced mass effect from 9/14 to 10/13    Plan:  ENT and heme onc following  Will titrate NRT weekly  See acute respiratory failure and large B cell lymphoma above      (HFpEF) heart failure with preserved ejection fraction [facility])  Assessment & Plan  Wt Readings from Last 3 Encounters:   10/14/23 81.8 kg (180 lb 5.4 oz)   09/07/23 74.6 kg (164 lb 6.4 oz)   08/30/23 73.3 kg (161 lb 9.6 oz)     Lab Results   Component Value Date    LVEF 60 08/28/2023     (H) 09/29/2023     (H) 09/13/2023     Echo 8/28/23: LVEF 60%. Plan:  Goal Mg > 2 and K > 4  Measure I/O      Angioedema  Assessment & Plan  POA in [facility] ED: Swollen tongue, soft and hard palate with severe angioedema. Decadron 10 mg, Benadryl 25 mg given. Initially refused but eventually agreed to intubation. 2/2 oropharyngeal mass compression. Plan:  Cuffless trach placed 9/17, transitioned to cuffed 10/3  See acute respiratory failure with hypoxia and oropharyngeal mass above    Ambulatory dysfunction  Assessment & Plan  Impacted by chronic pain syndrome, but medication regimen may also contribute. Patient currently requires tracheostomy with ICU level care and will be inpatient for R-EPOCH cycle 2 start 10/13.   Will require PT/ OT eval before discharge. Need LTACH placement via     Pain syndrome, chronic  Assessment & Plan  Home regimen: Oxycodone 5 mg BID, Tylenol 650 mg q8 prn. Gabapentin 600 mg TID. Medical marijuana. Lumbar radiculopathy and impaired ambulatory dysfunction secondary to pain. Has bowel regimen and is having bowel movements daily. Plan:  Continue gabapentin 300 mg TID, oxycodone 5 mg TID, prn Tylenol 650 mg q6. Oxycodone 2.5 mg prn for chemo cycle as patient required additional pain management during previous cycle    Benign essential hypertension  Assessment & Plan  Home regimen Coreg 12.5 mg BID, Amlodipine 10 mg daily, and lisinopril 40 mg. All discontinued at this time secondary to hypotension and PO since cycle 1. but stable currently without any antihypertensives. Systolic persistently elevated and tachycardic, potentially secondary to pain. Patient was more hypotensive during last chemo. Coreg contraindicated during chemo. If needed can give prn hydralazine or restart norvasc    Plan:  Monitor vitals    Depression  Assessment & Plan  Patient on Zoloft 75 daily and Seroquel evening. Patient is depressed, family meeting with palliative on 10/5 for goals of care. Patient is firm that she wants to live and to fight, she is just tired. Consult Pastoral care    Generalized abdominal pain  Assessment & Plan  Patient reports abdominal pain which is unchanged since 9/22 no guarding on exam. Takes Famotidine for GERD at home. Alk phos 10/2 145, 10/14 156. CTCAP reveals complex right upper pole renal lesion requiring possible outpatient MRI    - Continue Famotidine  - On bowel regimen with Senna and Miralax prn    Chronic Superficial thrombophlebitis  Assessment & Plan  Right forearm soreness. BUE ultrasound in setting of high risk of DVT. RUE US: No evidence of acute or chronic deep vein thrombosis. 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  No longer requiring renal dosing as PO resolved    -Continue DVT ppx with Lovenox 40    Blister (nonthermal) of left forearm, sequela  Assessment & Plan  Blisters of LUE healing, no signs of infection, continue daily wound care    CKD (chronic kidney disease) stage 3, GFR 30-59 ml/min [facility])  Assessment & Plan  Lab Results   Component Value Date    EGFR 59 10/14/2023    EGFR 60 10/13/2023    EGFR 67 10/12/2023    CREATININE 0.97 10/14/2023    CREATININE 0.95 10/13/2023    CREATININE 0.87 10/12/2023     Baseline Cr appears to be between 0.75-1.1. Patient received 2 doses of Bumex IV 2 g as she had not been responding appropriately to IV 40 Lasix daily. Creatinine went up by 0.5 meeting criteria for an PO. This may be prerenal intrinsic or postrenal.  Potential prerenal causes include reduced kidney perfusion due to lisinopril. Intrinsic can also be lisinopril due to ATN, AIN from chemo medications, TLS, crystaline nephropathy from acyclovir, rituxan nephrotoxicity, filgrastim glomerulonephritis. Post renal obstructive could be from urine retention from vincristine or cyclophosphamide bladder fibrosis. Suspect most likely due to medications as a combination of prerenal and intrinsic. FENa was 0.2%, UA shows no casts or signs of infection, urine eosinophils 0%. Patient likely has prerenal PO from volume depletion. Received 2 L NS and 1 pRBC and cr went up by 0.07 still and Na and Cl went down, suspect that volume prevented further cr rise and free water excretion impaired by kidney function, allowing hyponatremia to increase. Creatinine increase is likely plateaued and went down the following day. Suspect that now that nephrotoxic medications have been stopped she will continue to respond well to Lasix and creatinine will continue to downtrend. PO now resolved. Will be cautious about nephrotoxins and monitor as restarting EPOCH and ppx. Patient gets volume depleted and overloaded very easily.  Will likely be depleted from chemo         ICU Core Measures     A: Assess, Prevent, and Manage Pain Has pain been assessed? Yes  Need for changes to pain regimen? No   B: Both SAT/SAT  N/A   C: Choice of Sedation RASS Goal: 0 Alert and Calm or N/A patient not on sedation  Need for changes to sedation or analgesia regimen? No   D: Delirium CAM-ICU: Negative   E: Early Mobility  Plan for early mobility? No   F: Family Engagement Plan for family engagement today? No       Antibiotic Review: Continue broad spectrum secondary to severity of illness. Review of Invasive Devices:            Prophylaxis:  VTE VTE covered by:  enoxaparin, Subcutaneous, 40 mg at 10/13/23 0802       Stress Ulcer  covered byfamotidine (PEPCID) oral suspension 40 mg [[id number]], pantoprazole (PROTONIX) injection 40 mg [[id number]]          Subjective   HPI/24hr events:   Overnight patient requested to leave cuff down, but it was eventually put up. Today is day 2 of chemo cycle. Patient was frustrated this morning to have cuff up. Review of Systems   Constitutional:  Negative for fever. Gastrointestinal:  Positive for nausea. Objective                            Vitals I/O      Most Recent Min/Max in 24hrs   Temp 98.4 °F (36.9 °C) Temp  Min: 97.2 °F (36.2 °C)  Max: 99.5 °F (37.5 °C)   Pulse 103 Pulse  Min: 93  Max: 119   Resp 20 Resp  Min: 13  Max: 30   /81 BP  Min: 101/56  Max: 162/84   O2 Sat 100 % SpO2  Min: 86 %  Max: 100 %      Intake/Output Summary (Last 24 hours) at 10/14/2023 0752  Last data filed at 10/14/2023 0600  Gross per 24 hour   Intake 2277 ml   Output 1175 ml   Net 1102 ml         Diet Enteral/Parenteral; Tube Feeding No Oral Diet; Two Telly HN; Continuous; 38; 190; Water; Every 4 hours     Invasive Monitoring Physical exam    Physical Exam  Constitutional:       General: She is in acute distress. Appearance: She is not ill-appearing.       Comments: Patient does not wish to be examined this morning she just wants the cuff down   HENT:      Head: Normocephalic and atraumatic. Cardiovascular:      Rate and Rhythm: Regular rhythm. Tachycardia present. Pulmonary:      Effort: Pulmonary effort is normal.   Musculoskeletal:      Right lower leg: No edema. Left lower leg: No edema. Skin:     Coloration: Skin is not jaundiced or pale. Neurological:      Mental Status: She is alert. Diagnostic Studies      Imaging:   CT CAP 10/12:   Diffuse bilateral groundglass opacities and areas of more dense consolidation as described above which could represent pulmonary edema, pneumonia, or diffuse alveolar damage. Complex right upper pole renal lesion for which further evaluation is recommended with MRI on an outpatient basis. Mildly enlarged mediastinal lymph nodes which may be due to the patient's known lymphoma. CT soft tissue neck 10/13:  Improving multi spatial mass centered in the left nasopharynx/oropharynx. Again the mass extends up to the skull base in the infratemporal fossa with loss of perineural fat at the left foramen ovale. Decreased mass effect on the airway. No adenopathy by size criteria. Bilateral effusions and extensive airspace disease is stable compared with yesterday's chest CT.     I have personally reviewed pertinent films in PACS     Medications:  Scheduled PRN   acyclovir, 400 mg, BID  busPIRone, 30 mg, TID  chlorhexidine, 15 mL, Q12H [address]  [START ON 10/17/2023] cyclophosphamide (CYTOXAN) 1,350 mg in sodium chloride 0.9 % 250 mL IVPB, 750 mg/m2 (Treatment Plan Recorded), Once  DOXOrubicin (ADRIAMYCIN) 18 mg, etoposide (TOPOSAR) 67.6 mg, vinCRIStine (ONCOVIN) 0.7 mg in sodium chloride 0.9 % 500 mL infusion, , Q24H  enoxaparin, 40 mg, Q24H ASHLEY  famotidine, 20 mg, BID  fluconazole, 200 mg, Daily  gabapentin, 300 mg, TID  levalbuterol, 1.25 mg, TID   And  ipratropium, 0.5 mg, TID  levofloxacin, 250 mg, Q24H [address]  nicotine, 14 mg, Daily   Followed by  Kendrick Chawlas ON 10/16/2023] nicotine, 7 mg, Daily  ondansetron (ZOFRAN) 16 mg, dexamethasone (DECADRON) 10 mg in sodium chloride 0.9 % 50 mL IVPB, , Q24H  oxyCODONE, 5 mg, Q8H  predniSONE, 60 mg/m2 (Treatment Plan Recorded), BID With Meals  QUEtiapine, 50 mg, HS  senna, 8.8 mg, HS  sertraline, 75 mg, Daily  [START ON 10/17/2023] sodium chloride, 2,000 mL, Once  sodium chloride, 4 mL, TID  sulfamethoxazole-trimethoprim, 1 tablet, Once per day on Mon Wed Fri      acetaminophen, 650 mg, Q6H PRN  alteplase, 2 mg, Q1MIN PRN  guaiFENesin, 200 mg, Q6H PRN  ondansetron, 4 mg, Q8H PRN  ondansetron, 4 mg, Q8H PRN  oxyCODONE, 2.5 mg, Q6H PRN  polyethylene glycol, 17 g, Daily PRN  sodium chloride, 20 mL/hr, Daily PRN       Continuous          Labs:    CBC    Recent Labs     10/13/23  0355 10/14/23  0608   WBC 16.00* 20.25*   HGB 7.2* 8.4*   HCT 24.1* 26.7*    460*   BANDSPCT 12*  --      BMP    Recent Labs     10/13/23  0355 10/14/23  0608   SODIUM 140 139   K 4.7 5.1   CL 98 100   CO2 37* 33*   AGAP 5 6   BUN 34* 35*   CREATININE 0.95 0.97   CALCIUM 9.3 9.5       Coags    No recent results     Additional Electrolytes  Recent Labs     10/13/23  0355 10/13/23  1634 10/14/23  0608   MG 2.1  --  2.2   PHOS 4.7*  --  4.1   CAIONIZED  --  1.23  --           Blood Gas    No recent results  No recent results LFTs  Recent Labs     10/13/23  0355 10/14/23  0608   ALT 27 32   AST 30 29   ALKPHOS 143* 156*   ALB 2.5* 2.8*   TBILI 0.29 0.30       Infectious  No recent results  Glucose  Recent Labs     10/12/23  1758 10/13/23  0355 10/14/23  0608   GLUC 108 119 137                   Uvaldo Petersen MD

## 2023-10-15 LAB
ALBUMIN SERPL BCP-MCNC: 2.9 G/DL (ref 3.5–5)
ALP SERPL-CCNC: 138 U/L (ref 34–104)
ALT SERPL W P-5'-P-CCNC: 37 U/L (ref 7–52)
ANION GAP SERPL CALCULATED.3IONS-SCNC: 6 MMOL/L
AST SERPL W P-5'-P-CCNC: 31 U/L (ref 13–39)
ATRIAL RATE: 122 BPM
BILIRUB SERPL-MCNC: 0.29 MG/DL (ref 0.2–1)
BUN SERPL-MCNC: 31 MG/DL (ref 5–25)
CALCIUM ALBUM COR SERPL-MCNC: 10.4 MG/DL (ref 8.3–10.1)
CALCIUM SERPL-MCNC: 9.5 MG/DL (ref 8.4–10.2)
CHLORIDE SERPL-SCNC: 100 MMOL/L (ref 96–108)
CO2 SERPL-SCNC: 32 MMOL/L (ref 21–32)
CREAT SERPL-MCNC: 0.77 MG/DL (ref 0.6–1.3)
ERYTHROCYTE [DISTWIDTH] IN BLOOD BY AUTOMATED COUNT: 16.1 % (ref 11.6–15.1)
GFR SERPL CREATININE-BSD FRML MDRD: 78 ML/MIN/1.73SQ M
GLUCOSE SERPL-MCNC: 143 MG/DL (ref 65–140)
HCT VFR BLD AUTO: 26.5 % (ref 34.8–46.1)
HGB BLD-MCNC: 8.4 G/DL (ref 11.5–15.4)
LDH SERPL-CCNC: 276 U/L (ref 140–271)
MCH RBC QN AUTO: 30.9 PG (ref 26.8–34.3)
MCHC RBC AUTO-ENTMCNC: 31.7 G/DL (ref 31.4–37.4)
MCV RBC AUTO: 97 FL (ref 82–98)
P AXIS: 74 DEGREES
PLATELET # BLD AUTO: 576 THOUSANDS/UL (ref 149–390)
PMV BLD AUTO: 9.9 FL (ref 8.9–12.7)
POTASSIUM SERPL-SCNC: 4.5 MMOL/L (ref 3.5–5.3)
PR INTERVAL: 172 MS
PROT SERPL-MCNC: 5.9 G/DL (ref 6.4–8.4)
QRS AXIS: 85 DEGREES
QRSD INTERVAL: 82 MS
QT INTERVAL: 312 MS
QTC INTERVAL: 444 MS
RBC # BLD AUTO: 2.72 MILLION/UL (ref 3.81–5.12)
SODIUM SERPL-SCNC: 138 MMOL/L (ref 135–147)
T WAVE AXIS: 80 DEGREES
URATE SERPL-MCNC: 3.9 MG/DL (ref 2–7.5)
VENTRICULAR RATE: 122 BPM
WBC # BLD AUTO: 15.56 THOUSAND/UL (ref 4.31–10.16)

## 2023-10-15 PROCEDURE — 80053 COMPREHEN METABOLIC PANEL: CPT | Performed by: INTERNAL MEDICINE

## 2023-10-15 PROCEDURE — 94003 VENT MGMT INPAT SUBQ DAY: CPT

## 2023-10-15 PROCEDURE — 84550 ASSAY OF BLOOD/URIC ACID: CPT | Performed by: INTERNAL MEDICINE

## 2023-10-15 PROCEDURE — 94640 AIRWAY INHALATION TREATMENT: CPT

## 2023-10-15 PROCEDURE — 93010 ELECTROCARDIOGRAM REPORT: CPT | Performed by: INTERNAL MEDICINE

## 2023-10-15 PROCEDURE — 99291 CRITICAL CARE FIRST HOUR: CPT | Performed by: STUDENT IN AN ORGANIZED HEALTH CARE EDUCATION/TRAINING PROGRAM

## 2023-10-15 PROCEDURE — 85027 COMPLETE CBC AUTOMATED: CPT | Performed by: INTERNAL MEDICINE

## 2023-10-15 PROCEDURE — 94760 N-INVAS EAR/PLS OXIMETRY 1: CPT

## 2023-10-15 PROCEDURE — 94150 VITAL CAPACITY TEST: CPT

## 2023-10-15 PROCEDURE — 83615 LACTATE (LD) (LDH) ENZYME: CPT | Performed by: INTERNAL MEDICINE

## 2023-10-15 RX ORDER — SODIUM CHLORIDE FOR INHALATION 3 %
4 VIAL, NEBULIZER (ML) INHALATION EVERY 6 HOURS PRN
Status: DISCONTINUED | OUTPATIENT
Start: 2023-10-15 | End: 2023-10-27

## 2023-10-15 RX ADMIN — IPRATROPIUM BROMIDE 0.5 MG: 0.5 SOLUTION RESPIRATORY (INHALATION) at 07:40

## 2023-10-15 RX ADMIN — BUSPIRONE HYDROCHLORIDE 30 MG: 10 TABLET ORAL at 17:31

## 2023-10-15 RX ADMIN — SODIUM CHLORIDE 20 ML/HR: 0.9 INJECTION, SOLUTION INTRAVENOUS at 09:46

## 2023-10-15 RX ADMIN — OXYCODONE HYDROCHLORIDE 5 MG: 5 SOLUTION ORAL at 22:06

## 2023-10-15 RX ADMIN — BUSPIRONE HYDROCHLORIDE 30 MG: 10 TABLET ORAL at 22:06

## 2023-10-15 RX ADMIN — GABAPENTIN 300 MG: 300 CAPSULE ORAL at 10:00

## 2023-10-15 RX ADMIN — ACYCLOVIR 400 MG: 200 CAPSULE ORAL at 17:31

## 2023-10-15 RX ADMIN — SODIUM CHLORIDE SOLN NEBU 3% 4 ML: 3 NEBU SOLN at 07:40

## 2023-10-15 RX ADMIN — FAMOTIDINE 20 MG: 20 TABLET, FILM COATED ORAL at 17:31

## 2023-10-15 RX ADMIN — PREDNISONE 100 MG: 50 TABLET ORAL at 17:31

## 2023-10-15 RX ADMIN — CHLORHEXIDINE GLUCONATE 15 ML: 1.2 SOLUTION ORAL at 10:01

## 2023-10-15 RX ADMIN — FAMOTIDINE 20 MG: 20 TABLET, FILM COATED ORAL at 10:00

## 2023-10-15 RX ADMIN — GABAPENTIN 300 MG: 300 CAPSULE ORAL at 17:31

## 2023-10-15 RX ADMIN — SERTRALINE 75 MG: 25 TABLET, FILM COATED ORAL at 10:00

## 2023-10-15 RX ADMIN — PREDNISONE 100 MG: 50 TABLET ORAL at 10:00

## 2023-10-15 RX ADMIN — ACYCLOVIR 400 MG: 200 CAPSULE ORAL at 09:59

## 2023-10-15 RX ADMIN — GABAPENTIN 300 MG: 300 CAPSULE ORAL at 22:06

## 2023-10-15 RX ADMIN — LEVOFLOXACIN 250 MG: 250 TABLET, FILM COATED ORAL at 09:51

## 2023-10-15 RX ADMIN — CHLORHEXIDINE GLUCONATE 15 ML: 1.2 SOLUTION ORAL at 22:06

## 2023-10-15 RX ADMIN — FLUCONAZOLE 200 MG: 100 TABLET ORAL at 09:51

## 2023-10-15 RX ADMIN — SODIUM CHLORIDE SOLN NEBU 3% 4 ML: 3 NEBU SOLN at 20:58

## 2023-10-15 RX ADMIN — LEVALBUTEROL HYDROCHLORIDE 1.25 MG: 1.25 SOLUTION RESPIRATORY (INHALATION) at 13:59

## 2023-10-15 RX ADMIN — IPRATROPIUM BROMIDE 0.5 MG: 0.5 SOLUTION RESPIRATORY (INHALATION) at 13:58

## 2023-10-15 RX ADMIN — ETOPOSIDE: 20 INJECTION, SOLUTION INTRAVENOUS at 10:02

## 2023-10-15 RX ADMIN — LEVALBUTEROL HYDROCHLORIDE 1.25 MG: 1.25 SOLUTION RESPIRATORY (INHALATION) at 07:40

## 2023-10-15 RX ADMIN — ENOXAPARIN SODIUM 40 MG: 40 INJECTION SUBCUTANEOUS at 10:01

## 2023-10-15 RX ADMIN — OXYCODONE HYDROCHLORIDE 5 MG: 5 SOLUTION ORAL at 12:39

## 2023-10-15 RX ADMIN — QUETIAPINE FUMARATE 50 MG: 25 TABLET ORAL at 22:06

## 2023-10-15 RX ADMIN — NICOTINE 14 MG: 14 PATCH, EXTENDED RELEASE TRANSDERMAL at 10:02

## 2023-10-15 RX ADMIN — IPRATROPIUM BROMIDE 0.5 MG: 0.5 SOLUTION RESPIRATORY (INHALATION) at 20:58

## 2023-10-15 RX ADMIN — OXYCODONE HYDROCHLORIDE 5 MG: 5 SOLUTION ORAL at 04:21

## 2023-10-15 RX ADMIN — LEVALBUTEROL HYDROCHLORIDE 1.25 MG: 1.25 SOLUTION RESPIRATORY (INHALATION) at 20:58

## 2023-10-15 RX ADMIN — ONDANSETRON: 2 INJECTION INTRAMUSCULAR; INTRAVENOUS at 08:57

## 2023-10-15 RX ADMIN — BUSPIRONE HYDROCHLORIDE 30 MG: 10 TABLET ORAL at 09:59

## 2023-10-15 NOTE — WOUND OSTOMY CARE
Progress Note - Wound   Keven Quivers 77 y.o. female MRN: [id number]  Unit/Bed#: ICU 03 Encounter: [id number]        Assessment:   Patient seen today for wound care follow up assessment. Patient is incontinent of bowel and bladder. Patient is max assist with turning from side to side for assessment. Patient is independent with meals. Assessment Findings:   Sacral/buttocks and B/L heels intact and blanching, preventative skin care orders placed. 1. Unstageable pressure injury under trach- area of full thickness skin loss from pressure from trach plate mixed etiology of moisture due to large amount of mucous from trach, wound tissue is 100% yellow slough and periwound skin fragile, moderate serosanguineous drainge. No induration, fluctuance, odor, warmth/temperature differences, redness, or purulence noted to the above noted wounds and skin areas assessed. New dressings applied per orders listed below. Patient tolerated well- no s/s of non-verbal pain or discomfort observed during the encounter. Bedside nurse aware of plan of care. See flow sheets for more detailed assessment findings. Wound care will continue to follow. Skin care plans:  1-Hydraguard to bilateral sacrum, buttock and heels BID and PRN  2-Elevate heels to offload pressure. 3-Ehob cushion in chair when out of bed. 4-Moisturize skin daily with skin nourishing cream.  5-Turn/reposition q2h or when medically stable for pressure re-distribution on skin. 6-Cleanse neck wound with normal saline, apply maxorb to wound, cover with split optifoam, change daily and PRN soilage/displacement.     Wound 09/17/23 Neck N/A (Active)   Wound Image   10/04/23 1022   Wound Description Dry 10/15/23 0400   Tracy-wound Assessment Dry 10/15/23 0400   Wound Length (cm) 1 cm 10/04/23 1022   Wound Width (cm) 1.3 cm 10/04/23 1022   Wound Depth (cm) 0.2 cm 10/04/23 1022   Wound Surface Area (cm^2) 1.3 cm^2 10/04/23 1022   Wound Volume (cm^3) 0.26 cm^3 10/04/23 1022   Calculated Wound Volume (cm^3) 0.26 cm^3 10/04/23 1022   Tunneling 0 cm 10/04/23 1022   Tunneling in depth located at 0 10/04/23 1022   Undermining 0 10/04/23 1022   Undermining is depth extending from 0 10/04/23 1022   Wound Site Closure MICA 10/04/23 1022   Drainage Amount Small 10/04/23 1022   Drainage Description Serosanguineous 10/04/23 1022   Non-staged Wound Description Full thickness 10/04/23 1022   Treatments Cleansed 10/07/23 2000   Dressing Other (Comment) 10/12/23 1600   Wound packed? No 10/04/23 1022   Packing- # removed 0 10/04/23 1022   Packing- # inserted 0 10/04/23 1022   Dressing Changed New 10/04/23 1022   Patient Tolerance Tolerated well 10/04/23 1022   Dressing Status Clean;Dry; Intact 10/15/23 0400       Wound 09/21/23 Pressure Injury Throat Medial (Active)   Wound Image   10/15/23 1040   Wound Description Slough; Yellow 10/15/23 1117   Pressure Injury Stage U 10/15/23 1117   Tracy-wound Assessment Fragile 10/15/23 1117   Wound Length (cm) 1.2 cm 10/15/23 1117   Wound Width (cm) 1.5 cm 10/15/23 1117   Wound Depth (cm) 0.2 cm 10/15/23 1117   Wound Surface Area (cm^2) 1.8 cm^2 10/15/23 1117   Wound Volume (cm^3) 0.36 cm^3 10/15/23 1117   Calculated Wound Volume (cm^3) 0.36 cm^3 10/15/23 1117   Change in Wound Size % -80 10/15/23 1117   Tunneling 0 cm 10/15/23 1117   Tunneling in depth located at 0 10/15/23 1117   Undermining 0 10/15/23 1117   Undermining is depth extending from 0 10/15/23 1117   Wound Site Closure MICA 10/15/23 1117   Drainage Amount Moderate 10/15/23 1117   Drainage Description Serous; Yellow 10/15/23 1117   Non-staged Wound Description Full thickness 10/15/23 1117   Treatments Cleansed 10/15/23 1117   Dressing Calcium Alginate;Gauze 10/15/23 1117   Wound packed? No 10/15/23 1117   Packing- # removed 0 10/15/23 1117   Packing- # inserted 0 10/15/23 1117   Dressing Changed New 10/15/23 1117   Patient Tolerance Tolerated well 10/15/23 1117   Dressing Status Clean;Dry; Intact 10/15/23 [address], RN, [facility] [place]

## 2023-10-15 NOTE — ASSESSMENT & PLAN NOTE
9/13 CT PE study: Patchy consolidation right middle lobe, new from 8/25/2023, compatible with pneumonia. No leukocytosis, no fever/chills. Positive for sore throat, cough. New onset pneumonia after recent hospitalization and antibiotics treatment. 9/14: Bronchoscopy/ ABL. MRSA negative. ABL revealed 2+ Growth of Candida albicans, suspected contaminant.

## 2023-10-15 NOTE — ASSESSMENT & PLAN NOTE
Lab Results   Component Value Date    CREATININE 0.77 10/15/2023    CREATININE 0.97 10/14/2023    CREATININE 0.95 10/13/2023       Likely prerenal.  Second to poor oral intake versus poor urine output. Baseline creatinine 1 to 1.2.     Cr at baseline  Plan:  Urinary retention protocol, Daily weights, I/O  Trend Cr daily

## 2023-10-15 NOTE — PROGRESS NOTES
[address] [facility]  Progress Note: Critical Care  Name: Pricila Baig 77 y.o. female I MRN: [id number]  Unit/Bed#: ICU 03 I Date of Admission: 9/13/2023   Date of Service: 10/15/2023 I Hospital Day: 28    Assessment/Plan   Depression  Assessment & Plan  Patient on Zoloft 75 daily and Seroquel evening. Patient is depressed, family meeting with palliative on 10/5 for goals of care. Patient is firm that she wants to live and to fight, she is just tired. Consult Pastoral care    Generalized abdominal pain  Assessment & Plan  Patient reports abdominal pain which is unchanged since 9/22 no guarding on exam. Takes Famotidine for GERD at home. Alk phos 10/2 145, 10/14 156. CTCAP reveals complex right upper pole renal lesion requiring possible outpatient MRI    - Continue Famotidine  - On bowel regimen with Senna and Miralax prn    Chronic Superficial thrombophlebitis  Assessment & Plan  Right forearm soreness. BUE ultrasound in setting of high risk of DVT. RUE US: No evidence of acute or chronic deep vein thrombosis. 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 No longer requiring renal dosing as PO resolved    -Continue DVT ppx with Lovenox 40    Large cell lymphoma ([address])  Assessment & Plan  Large B-cell lymphoma, stage II. First chemo cycle 9/22 4 days of R-EPOCH. 9/27 Rituxan. 9/28 Filgrastim. Acyclovir, Zyloprim, Diflucan, Levaquin, Zofran, Bactrim for chemo prophylaxis, all discontinued with heme-onc's approval as Nafisa reached 4200. I suspect leukocytosis is secondary to the filgrastim. Restarted all ppx 10/13 for [facility] cycle 2 which will run for the next 4 days. Ppx and filgrastim can be discontinued when patient is no longer neutropenic after this cycle again.     Plan:  Keep Hgb>7 and Plt>20 for chemo  See acute respiratory failure above    Blister (nonthermal) of left forearm, sequela  Assessment & Plan  Blisters of LUE healing, no signs of infection, continue daily wound care    Oropharyngeal mass  Assessment & Plan  9/14 Neck/ soft tissue CT: Large enhancing soft tissue mass identified within the left neck involving multiple spaces. Centered within the left oropharynx with extensions as described above into multiple spaces. This results in significant airway compromise. 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 Small level 3/level 4 nodes are seen within the neck. Tracheostomy by ENT, bx & addition testing performed. Replaced on 9/26. H/o tobacco abuse, daily smoker. On nicotine while inpatient, confirmed with patient she needs nicotine patch. CT soft tissue neck shows reduced mass effect from 9/14 to 10/13    Plan:  ENT and heme onc following  Will titrate NRT weekly  See acute respiratory failure and large B cell lymphoma above      Angioedema  Assessment & Plan  POA in [place] ([place]) ED: Swollen tongue, soft and hard palate with severe angioedema. Decadron 10 mg, Benadryl 25 mg given. Initially refused but eventually agreed to intubation. 2/2 oropharyngeal mass compression. Plan:  Cuffless trach placed 9/17, transitioned to cuffed 10/3  See acute respiratory failure with hypoxia and oropharyngeal mass above    Ambulatory dysfunction  Assessment & Plan  Impacted by chronic pain syndrome, but medication regimen may also contribute. Patient currently requires tracheostomy with ICU level care and will be inpatient for R-EPOCH cycle 2 start 10/13. Will require PT/ OT eval before discharge. Need LTACH placement via CM    (HFpEF) heart failure with preserved ejection fraction [facility])  Assessment & Plan  Wt Readings from Last 3 Encounters:   10/14/23 81.8 kg (180 lb 5.4 oz)   09/07/23 74.6 kg (164 lb 6.4 oz)   08/30/23 73.3 kg (161 lb 9.6 oz)     Lab Results   Component Value Date    LVEF 60 08/28/2023     (H) 09/29/2023     (H) 09/13/2023     Echo 8/28/23: LVEF 60%.        Plan:  Goal Mg > 2 and K > 4  Measure I/O      CKD (chronic kidney disease) stage 3, GFR 30-59 ml/min [facility])  Assessment & Plan  Lab Results   Component Value Date    EGFR 59 10/14/2023    EGFR 60 10/13/2023    EGFR 67 10/12/2023    CREATININE 0.97 10/14/2023    CREATININE 0.95 10/13/2023    CREATININE 0.87 10/12/2023     Baseline Cr appears to be between 0.75-1.1. Patient received 2 doses of Bumex IV 2 g as she had not been responding appropriately to IV 40 Lasix daily. Creatinine went up by 0.5 meeting criteria for an PO. This may be prerenal intrinsic or postrenal.  Potential prerenal causes include reduced kidney perfusion due to lisinopril. Intrinsic can also be lisinopril due to ATN, AIN from chemo medications, TLS, crystaline nephropathy from acyclovir, rituxan nephrotoxicity, filgrastim glomerulonephritis. Post renal obstructive could be from urine retention from vincristine or cyclophosphamide bladder fibrosis. Suspect most likely due to medications as a combination of prerenal and intrinsic. FENa was 0.2%, UA shows no casts or signs of infection, urine eosinophils 0%. Patient likely has prerenal PO from volume depletion. Received 2 L NS and 1 pRBC and cr went up by 0.07 still and Na and Cl went down, suspect that volume prevented further cr rise and free water excretion impaired by kidney function, allowing hyponatremia to increase. Creatinine increase is likely plateaued and went down the following day. Suspect that now that nephrotoxic medications have been stopped she will continue to respond well to Lasix and creatinine will continue to downtrend. PO now resolved. Will be cautious about nephrotoxins and monitor as restarting EPOCH and ppx. Patient gets volume depleted and overloaded very easily. Will likely be depleted from chemo     Cr at baseline    Pain syndrome, chronic  Assessment & Plan  Home regimen: Oxycodone 5 mg BID, Tylenol 650 mg q8 prn. Gabapentin 600 mg TID. Medical marijuana. Lumbar radiculopathy and impaired ambulatory dysfunction secondary to pain.   Has bowel regimen and is having bowel movements daily. Plan:  Continue gabapentin 300 mg TID, oxycodone 5 mg TID, prn Tylenol 650 mg q6. Oxycodone 2.5 mg prn for chemo cycle as patient required additional pain management during previous cycle    Benign essential hypertension  Assessment & Plan  Home regimen Coreg 12.5 mg BID, Amlodipine 10 mg daily, and lisinopril 40 mg. All discontinued at this time secondary to hypotension and PO since cycle 1. but stable currently without any antihypertensives. Systolic persistently elevated and tachycardic, potentially secondary to pain. Patient was more hypotensive during last chemo. Coreg contraindicated during chemo. If needed can give prn hydralazine or restart norvasc    Patient with BP > 170s in past 12 hours    Plan:  Monitor vitals. If continues to have elevated BP post chemo, start amlodipine    * Acute respiratory failure with hypoxia secondary to oropharyngeal mass  Assessment & Plan  Cuffless trach placed 9/17, transitioned to cuffed on 10/3. Oxygen requirements not improving. For mental health patient is on buspirone, quetiapine, and sertraline. For pain, gabapentin, oxycodone scheduled and prn, prn oxycodone mainly utilized for increased pain during chemo and after a bronch. On Guaifenesin, Atrovent and Xopenex for airway maintenance. No improvement in bilateral consolidation or atelectasis and groundglass opacities on repeat CT and chest x-rays. Patient not receiving proper positive pressure to open up atelectatic lung. Bronchial cx with 3 colonies CoNS. PCP PCR invalid, repeat negative. CTCAP indicates additional groundglass opacity with paraseptal emphysema, which may be contributing to inability to remain off vent.  SBTs have been unsuccessful to place patient on trach collar since return to ICU     Fungitell normal     Plan:  Maintain cuff overnight from 9 pm to 6 am for positive pressure, can open cuff during the day for patient to speak, consider bipap  Repeat Bcx if febrile  Consider repeat bronch for [facility] as Hgb dropping again    COPD without exacerbation (HCC)-resolved as of 9/26/2023  Assessment & Plan  Continue vent with nebs. Wean off vent. Encephalopathy-resolved as of 9/21/2023  Assessment & Plan  9/19- Pt appeared to be AAO x3. Improving. PO (acute kidney injury) (HCC)-resolved as of 9/21/2023  Assessment & Plan  Lab Results   Component Value Date    CREATININE 0.77 10/15/2023    CREATININE 0.97 10/14/2023    CREATININE 0.95 10/13/2023       Likely prerenal.  Second to poor oral intake versus poor urine output. Baseline creatinine 1 to 1.2. Cr at baseline  Plan:  Urinary retention protocol, Daily weights, I/O  Trend Cr daily    RML pneumonia-resolved as of 9/22/2023  Assessment & Plan  9/13 CT PE study: Patchy consolidation right middle lobe, new from 8/25/2023, compatible with pneumonia. No leukocytosis, no fever/chills. Positive for sore throat, cough. New onset pneumonia after recent hospitalization and antibiotics treatment. 9/14: Bronchoscopy/ ABL. MRSA negative. ABL revealed 2+ Growth of Candida albicans, suspected contaminant. Hyperlipidemia-resolved as of 10/2/2023  Assessment & Plan  Lab Results   Component Value Date    CHOLESTEROL 118 10/09/2023    CHOLESTEROL 203 (H) 01/24/2023    CHOLESTEROL 177 08/11/2022     Lab Results   Component Value Date    HDL 14 (L) 10/09/2023    HDL 45 (L) 01/24/2023    HDL 37 (L) 08/11/2022     Lab Results   Component Value Date    TRIG 144 10/09/2023    TRIG 166 (H) 09/16/2023    TRIG 160 (H) 01/24/2023     Lab Results   Component Value Date    NONHDLC 104 10/09/2023    [address] 146 07/12/2018    [address] 207 (H) 04/29/2013     Continue outpatient diet and lifestyle modification. Disposition: Stepdown Level 1    ICU Core Measures     A: Assess, Prevent, and Manage Pain Has pain been assessed? Yes  Need for changes to pain regimen?  No   B: Both SAT/SAT  N/A   C: Choice of Sedation RASS Goal: 0 Alert and Calm  Need for changes to sedation or analgesia regimen? No   D: Delirium CAM-ICU: Negative   E: Early Mobility  Plan for early mobility? Yes   F: Family Engagement Plan for family engagement today? Yes         Prophylaxis:  VTE VTE covered by:  enoxaparin, Subcutaneous, 40 mg at 10/14/23 0849       Stress Ulcer  not ordered          Subjective   HPI/24hr events: Patient states she slept well overnight. In good spirits this morning. Continues to have some discomfort around PEG and trach sites. Denies constitutional symptoms, CP, palpitations, N/V/D. Had BM yesterday  Review of Systems   Constitutional:  Negative for chills and fever. HENT:  Positive for sore throat (trach). Respiratory:  Negative for cough, chest tightness and shortness of breath. Cardiovascular:  Negative for chest pain and palpitations. Gastrointestinal:  Negative for abdominal distention and abdominal pain. Genitourinary:  Negative for dysuria and frequency. Musculoskeletal:  Negative for arthralgias. Neurological:  Negative for dizziness, light-headedness and headaches. Psychiatric/Behavioral:  Negative for agitation and confusion. Objective                            Vitals I/O      Most Recent Min/Max in 24hrs   Temp 98.3 °F (36.8 °C) Temp  Min: 97.5 °F (36.4 °C)  Max: 99.1 °F (37.3 °C)   Pulse 97 Pulse  Min: 93  Max: 104   Resp (!) 23 Resp  Min: 12  Max: 27   /87 BP  Min: 130/88  Max: 173/89   O2 Sat 99 % SpO2  Min: 92 %  Max: 100 %      Intake/Output Summary (Last 24 hours) at 10/15/2023 0920  Last data filed at 10/15/2023 0600  Gross per 24 hour   Intake 1794 ml   Output 1953 ml   Net -159 ml         Diet Enteral/Parenteral; Tube Feeding No Oral Diet; Two Telly HN; Continuous; 38; 190; Water; Every 4 hours     Invasive Monitoring Physical exam    Physical Exam  Constitutional:       General: She is not in acute distress. Appearance: Normal appearance. She is ill-appearing (chronically).  She is not toxic-appearing. HENT:      Head: Normocephalic and atraumatic. Mouth/Throat:      Mouth: Mucous membranes are moist.      Pharynx: No oropharyngeal exudate. Eyes:      General: No scleral icterus. Conjunctiva/sclera: Conjunctivae normal.   Cardiovascular:      Rate and Rhythm: Regular rhythm. Tachycardia present. Pulses: Normal pulses. Heart sounds: Normal heart sounds. No murmur heard. Pulmonary:      Effort: Pulmonary effort is normal.      Breath sounds: Rhonchi and rales present. No wheezing. Abdominal:      General: Bowel sounds are normal. There is no distension. Palpations: Abdomen is soft. Tenderness: There is no abdominal tenderness. Musculoskeletal:      Cervical back: Neck supple. No tenderness. Right lower leg: No edema. Left lower leg: No edema. Lymphadenopathy:      Cervical: No cervical adenopathy. Skin:     General: Skin is warm. Capillary Refill: Capillary refill takes less than 2 seconds. Coloration: Skin is not jaundiced. Neurological:      General: No focal deficit present. Mental Status: She is alert and oriented to person, place, and time. Mental status is at baseline.    Psychiatric:         Mood and Affect: Mood normal.         Behavior: Behavior normal.           Diagnostic Studies      EKG: None  Imaging: None  No new imaging     Medications:  Scheduled PRN   acyclovir, 400 mg, BID  busPIRone, 30 mg, TID  chlorhexidine, 15 mL, Q12H [address]  [START ON 10/17/2023] cyclophosphamide (CYTOXAN) 1,350 mg in sodium chloride 0.9 % 250 mL IVPB, 750 mg/m2 (Treatment Plan Recorded), Once  DOXOrubicin (ADRIAMYCIN) 18 mg, etoposide (TOPOSAR) 67.6 mg, vinCRIStine (ONCOVIN) 0.7 mg in sodium chloride 0.9 % 500 mL infusion, , Q24H  enoxaparin, 40 mg, Q24H ASHLEY  famotidine, 20 mg, BID  fluconazole, 200 mg, Daily  gabapentin, 300 mg, TID  levalbuterol, 1.25 mg, TID   And  ipratropium, 0.5 mg, TID  levofloxacin, 250 mg, Q24H ASHLEY  nicotine, 14 mg, Daily   Followed by  Hieu Nair ON 10/16/2023] nicotine, 7 mg, Daily  ondansetron (ZOFRAN) 16 mg, dexamethasone (DECADRON) 10 mg in sodium chloride 0.9 % 50 mL IVPB, , Q24H  oxyCODONE, 5 mg, Q8H  predniSONE, 60 mg/m2 (Treatment Plan Recorded), BID With Meals  QUEtiapine, 50 mg, HS  senna, 8.8 mg, HS  sertraline, 75 mg, Daily  [START ON 10/17/2023] sodium chloride, 2,000 mL, Once  sodium chloride, 4 mL, TID  sulfamethoxazole-trimethoprim, 1 tablet, Once per day on Mon Wed Fri      acetaminophen, 650 mg, Q6H PRN  alteplase, 2 mg, Q1MIN PRN  guaiFENesin, 200 mg, Q6H PRN  ondansetron, 4 mg, Q8H PRN  ondansetron, 4 mg, Q8H PRN  oxyCODONE, 2.5 mg, Q6H PRN  polyethylene glycol, 17 g, Daily PRN  sodium chloride, 20 mL/hr, Daily PRN       Continuous          Labs:    CBC    Recent Labs     10/14/23  0608 10/15/23  0517   WBC 20.25* 15.56*   HGB 8.4* 8.4*   HCT 26.7* 26.5*   * 576*   BANDSPCT 7  --      BMP    Recent Labs     10/14/23  0608 10/15/23  0517   SODIUM 139 138   K 5.1 4.5    100   CO2 33* 32   AGAP 6 6   BUN 35* 31*   CREATININE 0.97 0.77   CALCIUM 9.5 9.5       Coags    No recent results     Additional Electrolytes  Recent Labs     10/13/23  1634 10/14/23  0608   MG  --  2.2   PHOS  --  4.1   CAIONIZED 1.23  --           Blood Gas    No recent results  No recent results LFTs  Recent Labs     10/14/23  0608 10/15/23  0517   ALT 32 37   AST 29 31   ALKPHOS 156* 138*   ALB 2.8* 2.9*   TBILI 0.30 0.29       Infectious  No recent results  Glucose  Recent Labs     10/14/23  0608 10/15/23  0517   GLUC 224 Orion Nava MD

## 2023-10-15 NOTE — ASSESSMENT & PLAN NOTE
Lab Results   Component Value Date    CHOLESTEROL 118 10/09/2023    CHOLESTEROL 203 (H) 01/24/2023    CHOLESTEROL 177 08/11/2022     Lab Results   Component Value Date    HDL 14 (L) 10/09/2023    HDL 45 (L) 01/24/2023    HDL 37 (L) 08/11/2022     Lab Results   Component Value Date    TRIG 144 10/09/2023    TRIG 166 (H) 09/16/2023    TRIG 160 (H) 01/24/2023     Lab Results   Component Value Date    NONHDLC 104 10/09/2023    [address] 146 07/12/2018    [address] 207 (H) 04/29/2013     Continue outpatient diet and lifestyle modification.

## 2023-10-16 ENCOUNTER — APPOINTMENT (INPATIENT)
Dept: RADIOLOGY | Facility: HOSPITAL | Age: 68
DRG: 004 | End: 2023-10-16
Payer: COMMERCIAL

## 2023-10-16 LAB
ALBUMIN SERPL BCP-MCNC: 3.1 G/DL (ref 3.5–5)
ALP SERPL-CCNC: 130 U/L (ref 34–104)
ALT SERPL W P-5'-P-CCNC: 39 U/L (ref 7–52)
ANION GAP SERPL CALCULATED.3IONS-SCNC: 8 MMOL/L
AST SERPL W P-5'-P-CCNC: 27 U/L (ref 13–39)
BILIRUB SERPL-MCNC: 0.34 MG/DL (ref 0.2–1)
BUN SERPL-MCNC: 34 MG/DL (ref 5–25)
CALCIUM ALBUM COR SERPL-MCNC: 10.4 MG/DL (ref 8.3–10.1)
CALCIUM SERPL-MCNC: 9.7 MG/DL (ref 8.4–10.2)
CHLORIDE SERPL-SCNC: 99 MMOL/L (ref 96–108)
CO2 SERPL-SCNC: 31 MMOL/L (ref 21–32)
CREAT SERPL-MCNC: 0.8 MG/DL (ref 0.6–1.3)
ERYTHROCYTE [DISTWIDTH] IN BLOOD BY AUTOMATED COUNT: 15.4 % (ref 11.6–15.1)
FUNGUS SPEC CULT: NORMAL
FUNGUS SPEC CULT: NORMAL
GFR SERPL CREATININE-BSD FRML MDRD: 74 ML/MIN/1.73SQ M
GLUCOSE SERPL-MCNC: 164 MG/DL (ref 65–140)
HCT VFR BLD AUTO: 29.7 % (ref 34.8–46.1)
HGB BLD-MCNC: 9.3 G/DL (ref 11.5–15.4)
LDH SERPL-CCNC: 274 U/L (ref 140–271)
MAGNESIUM SERPL-MCNC: 1.9 MG/DL (ref 1.9–2.7)
MCH RBC QN AUTO: 29.8 PG (ref 26.8–34.3)
MCHC RBC AUTO-ENTMCNC: 31.3 G/DL (ref 31.4–37.4)
MCV RBC AUTO: 95 FL (ref 82–98)
NRBC BLD AUTO-RTO: 0 /100 WBCS
PLATELET # BLD AUTO: 696 THOUSANDS/UL (ref 149–390)
PMV BLD AUTO: 9.7 FL (ref 8.9–12.7)
POTASSIUM SERPL-SCNC: 4.2 MMOL/L (ref 3.5–5.3)
PROT SERPL-MCNC: 6.2 G/DL (ref 6.4–8.4)
RBC # BLD AUTO: 3.12 MILLION/UL (ref 3.81–5.12)
SODIUM SERPL-SCNC: 138 MMOL/L (ref 135–147)
URATE SERPL-MCNC: 4.4 MG/DL (ref 2–7.5)
WBC # BLD AUTO: 10.02 THOUSAND/UL (ref 4.31–10.16)

## 2023-10-16 PROCEDURE — 94150 VITAL CAPACITY TEST: CPT

## 2023-10-16 PROCEDURE — 83735 ASSAY OF MAGNESIUM: CPT

## 2023-10-16 PROCEDURE — 97116 GAIT TRAINING THERAPY: CPT

## 2023-10-16 PROCEDURE — 94640 AIRWAY INHALATION TREATMENT: CPT

## 2023-10-16 PROCEDURE — 99233 SBSQ HOSP IP/OBS HIGH 50: CPT | Performed by: INTERNAL MEDICINE

## 2023-10-16 PROCEDURE — 99232 SBSQ HOSP IP/OBS MODERATE 35: CPT

## 2023-10-16 PROCEDURE — 99291 CRITICAL CARE FIRST HOUR: CPT | Performed by: STUDENT IN AN ORGANIZED HEALTH CARE EDUCATION/TRAINING PROGRAM

## 2023-10-16 PROCEDURE — 85027 COMPLETE CBC AUTOMATED: CPT | Performed by: INTERNAL MEDICINE

## 2023-10-16 PROCEDURE — 83615 LACTATE (LD) (LDH) ENZYME: CPT | Performed by: INTERNAL MEDICINE

## 2023-10-16 PROCEDURE — 94760 N-INVAS EAR/PLS OXIMETRY 1: CPT

## 2023-10-16 PROCEDURE — 80053 COMPREHEN METABOLIC PANEL: CPT | Performed by: INTERNAL MEDICINE

## 2023-10-16 PROCEDURE — 71045 X-RAY EXAM CHEST 1 VIEW: CPT

## 2023-10-16 PROCEDURE — 84550 ASSAY OF BLOOD/URIC ACID: CPT | Performed by: INTERNAL MEDICINE

## 2023-10-16 PROCEDURE — 97530 THERAPEUTIC ACTIVITIES: CPT

## 2023-10-16 RX ORDER — MAGNESIUM SULFATE HEPTAHYDRATE 40 MG/ML
2 INJECTION, SOLUTION INTRAVENOUS ONCE
Status: COMPLETED | OUTPATIENT
Start: 2023-10-16 | End: 2023-10-16

## 2023-10-16 RX ORDER — HYDRALAZINE HYDROCHLORIDE 20 MG/ML
5 INJECTION INTRAMUSCULAR; INTRAVENOUS EVERY 6 HOURS PRN
Status: DISCONTINUED | OUTPATIENT
Start: 2023-10-16 | End: 2023-10-27

## 2023-10-16 RX ORDER — AMLODIPINE BESYLATE 5 MG/1
10 TABLET ORAL DAILY
Status: DISCONTINUED | OUTPATIENT
Start: 2023-10-16 | End: 2023-12-03

## 2023-10-16 RX ADMIN — GABAPENTIN 300 MG: 300 CAPSULE ORAL at 16:09

## 2023-10-16 RX ADMIN — BUSPIRONE HYDROCHLORIDE 30 MG: 10 TABLET ORAL at 20:58

## 2023-10-16 RX ADMIN — GABAPENTIN 300 MG: 300 CAPSULE ORAL at 08:10

## 2023-10-16 RX ADMIN — BUSPIRONE HYDROCHLORIDE 30 MG: 10 TABLET ORAL at 16:09

## 2023-10-16 RX ADMIN — LEVALBUTEROL HYDROCHLORIDE 1.25 MG: 1.25 SOLUTION RESPIRATORY (INHALATION) at 13:36

## 2023-10-16 RX ADMIN — NICOTINE 7 MG: 7 PATCH, EXTENDED RELEASE TRANSDERMAL at 08:12

## 2023-10-16 RX ADMIN — FAMOTIDINE 20 MG: 20 TABLET, FILM COATED ORAL at 17:48

## 2023-10-16 RX ADMIN — SERTRALINE 75 MG: 25 TABLET, FILM COATED ORAL at 08:11

## 2023-10-16 RX ADMIN — OXYCODONE HYDROCHLORIDE 5 MG: 5 SOLUTION ORAL at 11:43

## 2023-10-16 RX ADMIN — ETOPOSIDE: 20 INJECTION, SOLUTION INTRAVENOUS at 09:49

## 2023-10-16 RX ADMIN — IPRATROPIUM BROMIDE 0.5 MG: 0.5 SOLUTION RESPIRATORY (INHALATION) at 19:34

## 2023-10-16 RX ADMIN — ACYCLOVIR 400 MG: 200 CAPSULE ORAL at 17:48

## 2023-10-16 RX ADMIN — OXYCODONE HYDROCHLORIDE 5 MG: 5 SOLUTION ORAL at 20:58

## 2023-10-16 RX ADMIN — FAMOTIDINE 20 MG: 20 TABLET, FILM COATED ORAL at 08:10

## 2023-10-16 RX ADMIN — SULFAMETHOXAZOLE AND TRIMETHOPRIM 1 TABLET: 800; 160 TABLET ORAL at 08:11

## 2023-10-16 RX ADMIN — LEVALBUTEROL HYDROCHLORIDE 1.25 MG: 1.25 SOLUTION RESPIRATORY (INHALATION) at 07:22

## 2023-10-16 RX ADMIN — GABAPENTIN 300 MG: 300 CAPSULE ORAL at 20:58

## 2023-10-16 RX ADMIN — SODIUM CHLORIDE SOLN NEBU 3% 4 ML: 3 NEBU SOLN at 07:22

## 2023-10-16 RX ADMIN — FLUCONAZOLE 200 MG: 100 TABLET ORAL at 08:10

## 2023-10-16 RX ADMIN — ONDANSETRON: 2 INJECTION INTRAMUSCULAR; INTRAVENOUS at 08:01

## 2023-10-16 RX ADMIN — HYDRALAZINE HYDROCHLORIDE 5 MG: 20 INJECTION, SOLUTION INTRAMUSCULAR; INTRAVENOUS at 23:55

## 2023-10-16 RX ADMIN — CHLORHEXIDINE GLUCONATE 15 ML: 1.2 SOLUTION ORAL at 20:57

## 2023-10-16 RX ADMIN — PREDNISONE 100 MG: 50 TABLET ORAL at 16:09

## 2023-10-16 RX ADMIN — QUETIAPINE FUMARATE 50 MG: 25 TABLET ORAL at 21:00

## 2023-10-16 RX ADMIN — OXYCODONE HYDROCHLORIDE 5 MG: 5 SOLUTION ORAL at 04:48

## 2023-10-16 RX ADMIN — PREDNISONE 100 MG: 50 TABLET ORAL at 08:12

## 2023-10-16 RX ADMIN — CHLORHEXIDINE GLUCONATE 15 ML: 1.2 SOLUTION ORAL at 08:11

## 2023-10-16 RX ADMIN — ACYCLOVIR 400 MG: 200 CAPSULE ORAL at 08:11

## 2023-10-16 RX ADMIN — BUSPIRONE HYDROCHLORIDE 30 MG: 10 TABLET ORAL at 08:10

## 2023-10-16 RX ADMIN — ENOXAPARIN SODIUM 40 MG: 40 INJECTION SUBCUTANEOUS at 08:11

## 2023-10-16 RX ADMIN — AMLODIPINE BESYLATE 10 MG: 5 TABLET ORAL at 10:04

## 2023-10-16 RX ADMIN — LEVOFLOXACIN 250 MG: 250 TABLET, FILM COATED ORAL at 08:11

## 2023-10-16 RX ADMIN — IPRATROPIUM BROMIDE 0.5 MG: 0.5 SOLUTION RESPIRATORY (INHALATION) at 13:36

## 2023-10-16 RX ADMIN — LEVALBUTEROL HYDROCHLORIDE 1.25 MG: 1.25 SOLUTION RESPIRATORY (INHALATION) at 19:34

## 2023-10-16 RX ADMIN — IPRATROPIUM BROMIDE 0.5 MG: 0.5 SOLUTION RESPIRATORY (INHALATION) at 07:22

## 2023-10-16 RX ADMIN — MAGNESIUM SULFATE HEPTAHYDRATE 2 G: 40 INJECTION, SOLUTION INTRAVENOUS at 08:02

## 2023-10-16 NOTE — ASSESSMENT & PLAN NOTE
Wt Readings from Last 3 Encounters:   10/20/23 76.5 kg (168 lb 10.4 oz)   09/07/23 74.6 kg (164 lb 6.4 oz)   08/30/23 73.3 kg (161 lb 9.6 oz)     Lab Results   Component Value Date    LVEF 60 08/28/2023     (H) 09/29/2023     (H) 09/13/2023     Echo 8/28/23: LVEF 60%.        Plan:  K > 4   Measure I/O

## 2023-10-16 NOTE — PHYSICAL THERAPY NOTE
PT TREATMENT     10/16/23 1333   PT Last Visit   PT Visit Date 10/16/23   Note Type   Note Type Treatment   Pain Assessment   Pain Assessment Tool 0-10   Pain Score No Pain   Restrictions/Precautions   Other Precautions Fall Risk;Bed Alarm; Chair Alarm;Multiple lines;Aspiration;Telemetry;O2  (trach collar;High flow O2;PEG tube)   General   Chart Reviewed Yes   Family/Caregiver Present No   Cognition   Overall Cognitive Status WFL   Arousal/Participation Cooperative   Attention Within functional limits   Orientation Level Oriented X4   Memory Within functional limits   Following Commands Follows multistep commands with increased time or repetition   Subjective   Subjective "I will be happy to get out of this bed"   Bed Mobility   Supine to Sit 5  Supervision   Additional items Assist x 1;Verbal cues; Increased time required  (to pt's R side)   Additional Comments Pt received long sitting in bed   Transfers   Sit to Stand 4  Minimal assistance   Additional items Assist x 1;Verbal cues; Increased time required  (cues for safe hand placement)   Stand to Sit 4  Minimal assistance   Additional items Assist x 1;Verbal cues; Increased time required;Armrests  (cues for safe hand placement)   Additional Comments Pt stood with RW and marched in place 10 reps x 2 with rest inbetween trials and after OOB to chair. Ambulation/Elevation   Gait pattern Short stride; Step through pattern;Decreased foot clearance;Decreased heel strike   Gait Assistance 4  Minimal assist   Additional items Assist x 1;Verbal cues; Tactile cues   Assistive Device Rolling walker   Distance 6-8 steps bed to chair;distance limited by multiple lines   Balance   Static Sitting Good   Static Standing Fair  (w RW)   Ambulatory   (F-/F with RW)   Endurance Deficit   Endurance Deficit Yes   Endurance Deficit Description limited standing/marching and overall activity endurance   Activity Tolerance   Activity Tolerance Patient limited by fatigue;Treatment limited secondary to medical complications (Comment)  (weakness and deconditioning)   Nurse Made Aware Yes, Janet   Assessment   Problem List Decreased strength;Decreased endurance; Impaired balance;Decreased mobility; Decreased safety awareness   Assessment Pt agreeable to PT session this pm. Pleased to be OOB in the chair. Pt continues to progress with bed mob, trans and short distance amb with the RW - limited by multiple lines. Pt with good motivation and cooperation with PT activities today. Pt will cont to benefit from skilled PT services to increase pt's strength, functional mobility and progressive gait as medically able. Pt remains appropriate for post acute rehab services when medically stable for dc. The patient's AM-PAC Basic Mobility Inpatient Short Form Raw Score is 16. A Raw score of less than or equal to 16 suggests the patient may benefit from discharge to post-acute rehabilitation services. Please also refer to the recommendation of the Physical Therapist for safe discharge planning. Goals   STG Expiration Date 10/22/23   Short Term Goal #1 Patient will: Increase bilateral LE strength 1/2 grade to facilitate independent mobility, Perform all bed mobility tasks w/ minx1 to improve pt's independence w/ repositioning for decrease risk of skin breakdown, Perform all transfers w/ minx1 consistently from various height surfaces in order to improve I w/ engagement w/ real-world environments/situations, Ambulate at least 10+ ft. with roller walker w/ minx1 w/o LOB to facilitate return and engagement w/ previous living environment, Increase ambulatory and static and dynamic standing balance 1 grade to decrease risk for falls, Tolerate at least 15 consecutive minutes of activity to demonstrate improved activity tolerance and endurance and Tolerate 3 hr OOB to faciliate upright tolerance   Plan   Treatment/Interventions ADL retraining;Functional transfer training;LE strengthening/ROM; Therapeutic exercise; Endurance training;Patient/family training;Equipment eval/education; Bed mobility;Gait training; Compensatory technique education;Spoke to nursing;OT   PT Frequency 3-5x/wk   Discharge Recommendation   PT Discharge Recommendation Post acute rehabilitation services   AM-PAC Basic Mobility Inpatient   Turning in Flat Bed Without Bedrails 3   Lying on Back to Sitting on Edge of Flat Bed Without Bedrails 3   Moving Bed to Chair 3   Standing Up From Chair Using Arms 3   Walk in Room 3   Climb 3-5 Stairs With Railing 1   Basic Mobility Inpatient Raw Score 16   Basic Mobility Standardized Score 38.32   Highest Level Of Mobility   -HLM Goal 5: Stand one or more mins   JH-HLM Achieved 6: Walk 10 steps or more   Education   Education Provided Mobility training;Assistive device   Patient Explanation/teachback used; Reinforcement needed   End of Consult   Patient Position at End of Consult Bedside chair;Bed/Chair alarm activated; All needs within reach   [employer] Number  Ortega Holloway, [address]

## 2023-10-16 NOTE — PLAN OF CARE
Problem: PHYSICAL THERAPY ADULT  Goal: Performs mobility at highest level of function for planned discharge setting. See evaluation for individualized goals. Description: Treatment/Interventions: Functional transfer training, LE strengthening/ROM, Elevations, Therapeutic exercise, Endurance training, Patient/family training, Equipment eval/education, Bed mobility, Gait training, Spoke to nursing, Spoke to case management          See flowsheet documentation for full assessment, interventions and recommendations. Outcome: Progressing  Note: Prognosis: Fair  Problem List: Decreased strength, Decreased endurance, Impaired balance, Decreased mobility, Decreased safety awareness  Assessment: Pt agreeable to PT session this pm. Pleased to be OOB in the chair. Pt continues to progress with bed mob, trans and short distance amb with the RW - limited by multiple lines. Pt with good motivation and cooperation with PT activities today. Pt will cont to benefit from skilled PT services to increase pt's strength, functional mobility and progressive gait as medically able. Pt remains appropriate for post acute rehab services when medically stable for dc. Barriers to Discharge: Inaccessible home environment, Decreased caregiver support (Pt is at home alone during the day; + PAUL her home)     PT Discharge Recommendation: Post acute rehabilitation services    See flowsheet documentation for full assessment.

## 2023-10-16 NOTE — ASSESSMENT & PLAN NOTE
Home regimen: Oxycodone 5 mg BID, Tylenol 650 mg q8 prn. Gabapentin 600 mg TID. Medical marijuana. Lumbar radiculopathy and impaired ambulatory dysfunction secondary to pain. Has bowel regimen and is having bowel movements daily. Patient required additional pain management during previous cycle and has some additional pain from tunneled line placement    Plan:  Continue gabapentin 300 mg TID, oxycodone 5 mg TID, prn Tylenol 650 mg q6.     Oxycodone 2.5 mg prn can d/c a day after tunneled line replaced

## 2023-10-16 NOTE — PROGRESS NOTES
Progress Note - Palliative & Supportive Care  Ju Jacome  77 y.o.  female  ICU 03/ICU 03   MRN: [id number]  Encounter: [id number]     Assessment  Acute respiratory failure with hypoxia  HFpEF  Ambulatory dysfunction  New onset right middle lobe pneumonia  PO  Oropharyngeal mass  Goals of care counseling  Palliative care encounter    Plan  Symptom Management  Per ICU team at this time. Patient is comfortable on exam today. Goals of Care  Level 1 code status  Patient and her family's goals have remained very clear. Continue disease directed therapies without limits  Patient continues with chemotherapy and has had PEG tube placed. Palliative care will sign off at this time as goals have remained clear. Please reconsult if needed. Patient expresses she hopes to eventually get out of hospital and into a facility. Social Support  Patient supported by her son Pradeep Kim and daughter Sonya Light. She is . Emotional support provided  Palliative LSW following     Follow-up  Palliative care will sign off at this time. Patient's symptoms are currently well-controlled and her goals are clear. Please reconsult if needed and please reach out with any questions or concerns. 24 Hour History  Chart reviewed before visit. Per chart review, no acute events overnight. She has continued with chemotherapy. She has used 15 mg oxycodone in the past 24 hrs. Upon my encounter today, patient seen and examined at bedside with no family present. She appears comfortable and in no acute distress. She is pleasantly conversant and smiling. She is alert and oriented x3. She has no complaints at this time and denies pain, nausea, vomiting, constipation, or any other symptoms. She is watching TV. She reports wishing to continue with chemotherapy and all disease directed therapies. She wishes to eventually get out of hospital and into a facility. No further questions or concerns at today's visit.        Review of Systems   All other systems reviewed and are negative.       Medications    Current Facility-Administered Medications:     acetaminophen (TYLENOL) tablet 650 mg, 650 mg, Oral, Q6H PRN, Bea Lee MD, 650 mg at 10/11/23 2156    acyclovir (ZOVIRAX) capsule 400 mg, 400 mg, Oral, BID, Martha Farris MD, 400 mg at 10/16/23 0811    alteplase (CATHFLO) injection 2 mg, 2 mg, Intracatheter, Q1MIN PRN, Martha Farris MD    busPIRone (BUSPAR) tablet 30 mg, 30 mg, Oral, TID, Bea Lee MD, 30 mg at 10/16/23 0810    chlorhexidine (PERIDEX) 0.12 % oral rinse 15 mL, 15 mL, Mouth/Throat, Q12H [facility], Trent Spencer MD, 15 mL at 10/16/23 0811    [START ON 10/17/2023] cyclophosphamide (CYTOXAN) 1,350 mg in sodium chloride 0.9 % 250 mL IVPB, 750 mg/m2 (Treatment Plan Recorded), Intravenous, Once, Martha Farris MD    DOXOrubicin (ADRIAMYCIN) 18 mg, etoposide (TOPOSAR) 67.6 mg, vinCRIStine (ONCOVIN) 0.7 mg in sodium chloride 0.9 % 500 mL infusion, , Intravenous, Q24H, Martha Farris MD, Last Rate: 23 mL/hr at 10/15/23 1002, New Bag at 10/15/23 1002    enoxaparin (LOVENOX) subcutaneous injection 40 mg, 40 mg, Subcutaneous, Q24H [facility], Markos Booth MD, 40 mg at 10/16/23 0811    famotidine (PEPCID) tablet 20 mg, 20 mg, Oral, BID, Tretn Spencer MD, 20 mg at 10/16/23 0810    fluconazole (DIFLUCAN) tablet 200 mg, 200 mg, Oral, Daily, Martha Farris MD, 200 mg at 10/16/23 0810    gabapentin (NEURONTIN) capsule 300 mg, 300 mg, Oral, TID, Katherine Spencer MD, 300 mg at 10/16/23 0810    guaiFENesin (ROBITUSSIN) oral liquid 200 mg, 200 mg, Oral, Q6H PRN, Attila Bautista MD    levalbuterol Beth Gopi) inhalation solution 1.25 mg, 1.25 mg, Nebulization, TID, 1.25 mg at 10/16/23 0722 **AND** ipratropium (ATROVENT) 0.02 % inhalation solution 0.5 mg, 0.5 mg, Nebulization, TID, Adama Ho MD, 0.5 mg at 10/16/23 0722    levofloxacin (LEVAQUIN) tablet 250 mg, 250 mg, Oral, Q24H [facility], Mariana Kaba MD, 250 mg at 10/16/23 7703    magnesium sulfate 2 g/50 mL IVPB (premix) 2 g, 2 g, Intravenous, Once, Yo Baird MD, Last Rate: 25 mL/hr at 10/16/23 0802, 2 g at 10/16/23 0802    nicotine (NICODERM CQ) 14 mg/24hr TD 24 hr patch 14 mg, 14 mg, Transdermal, Daily, 14 mg at 10/15/23 1002 **FOLLOWED BY** nicotine (NICODERM CQ) 7 mg/24hr TD 24 hr patch 7 mg, 7 mg, Transdermal, Daily, Aretha Puga MD, 7 mg at 10/16/23 0812    ondansetron (ZOFRAN) 16 mg, dexamethasone (DECADRON) 10 mg in sodium chloride 0.9 % 50 mL IVPB, , Intravenous, Q24H, Mariana Kaba MD, Last Rate: 150 mL/hr at 10/16/23 0801, New Bag at 10/16/23 0801    ondansetron (ZOFRAN) injection 4 mg, 4 mg, Intravenous, Q8H PRN, Mj Spencer MD, 4 mg at 10/12/23 0808    ondansetron (ZOFRAN) injection 4 mg, 4 mg, Intravenous, Q8H PRN, Mariana Kaba MD    oxyCODONE (ROXICODONE) oral solution 2.5 mg, 2.5 mg, Oral, Q6H PRN, Maureen Thornton MD    oxyCODONE (ROXICODONE) oral solution 5 mg, 5 mg, Oral, Q8H, Bonnie Flores MD, 5 mg at 10/16/23 0448    [COMPLETED] polyethylene glycol (MIRALAX) packet 17 g, 17 g, Oral, Once, 17 g at 09/16/23 1053 **FOLLOWED BY** polyethylene glycol (MIRALAX) packet 17 g, 17 g, Oral, Daily PRN, Mj Spencer MD, 17 g at 09/24/23 1121    predniSONE tablet 100 mg, 60 mg/m2 (Treatment Plan Recorded), Oral, BID With Meals, Mariana Kaba MD, 100 mg at 10/16/23 8033    QUEtiapine (SEROquel) tablet 50 mg, 50 mg, Oral, HS, RANDEE Ellis, 50 mg at 10/15/23 2206    senna oral syrup 8.8 mg, 8.8 mg, Per NG Tube, HS, Trent Spencer MD, 8.8 mg at 10/14/23 2114    sertraline (ZOLOFT) tablet 75 mg, 75 mg, Per PEG Tube, Daily, RANDEE Ellis, 75 mg at 10/16/23 0811    [START ON 10/17/2023] sodium chloride 0.9 % bolus 2,000 mL, 2,000 mL, Intravenous, Once, Mariana Kaba MD    sodium chloride 0.9 % infusion, 20 mL/hr, Intravenous, Daily PRN, Iver Ormond, MD, Last Rate: 20 mL/hr at 10/15/23 0946, 20 mL/hr at 10/15/23 0946    sodium chloride 3 % inhalation solution 4 mL, 4 mL, Nebulization, Q6H PRN, Shanta Chaney, 4 mL at 10/16/23 1658    sulfamethoxazole-trimethoprim (BACTRIM DS) 800-160 mg per tablet 1 tablet, 1 tablet, Oral, Once per day on Mon Wed Fri, Iver Ormond, MD, 1 tablet at 10/16/23 0037    Objective  /85 (BP Location: Left arm)   Pulse 92   Temp 98.7 °F (37.1 °C) (Oral)   Resp (!) 28   Ht 5' 1" (1.549 m)   Wt 80.4 kg (177 lb 4 oz)   SpO2 100%   BMI 33.49 kg/m²   Physical Exam:   Constitutional: Comfortable and in no acute distress. Head: Normocephalic and atraumatic. Eyes: EOM are normal. No ocular discharge. No scleral icterus. Neck: no visible adenopathy or masses  Cardiac: Normal rate   Respiratory: No stridor. No respiratory distress. No cough. Trach in place  Musculoskeletal: No abnormal movements  Neurological: Alert and oriented x 3. Appropriately conversant. Skin: Dry, no diaphoresis. Psychiatric: Calm with no signs of agitation    Lab Results:   I have personally reviewed pertinent labs. , CBC:   Lab Results   Component Value Date    WBC 10.02 10/16/2023    HGB 9.3 (L) 10/16/2023    HCT 29.7 (L) 10/16/2023    MCV 95 10/16/2023     (H) 10/16/2023    RBC 3.12 (L) 10/16/2023    MCH 29.8 10/16/2023    MCHC 31.3 (L) 10/16/2023    RDW 15.4 (H) 10/16/2023    MPV 9.7 10/16/2023    NRBC 0 10/16/2023   , CMP:   Lab Results   Component Value Date    SODIUM 138 10/16/2023    K 4.2 10/16/2023    CL 99 10/16/2023    CO2 31 10/16/2023    BUN 34 (H) 10/16/2023    CREATININE 0.80 10/16/2023    CALCIUM 9.7 10/16/2023    AST 27 10/16/2023    ALT 39 10/16/2023    ALKPHOS 130 (H) 10/16/2023    EGFR 74 10/16/2023     Imaging Studies: I have personally reviewed pertinent reports. EKG, Pathology, and Other Studies: I have personally reviewed pertinent reports.     Counseling / Coordination of Care  Total floor / unit time spent today 30 minutes. Greater than 50% of total time was spent with the patient and / or family counseling and / or coordinating of care. A description of the counseling / coordination of care: Chart reviewed, provided medical updates, determined goals of care, discussed palliative care and symptom management, determined competency and POA/HCA, determined social/family support, provided psychosocial support. William Guallpa PA-C  Palliative & Supportive Care    Portions of this document may have been created using dictation software and as such some "sound alike" terms may have been generated by the system. Do not hesitate to contact me with any questions or clarifications.

## 2023-10-16 NOTE — PROGRESS NOTES
[address] [facility]  Progress Note: Critical Care  Name: Melissa Esquivel 77 y.o. female I MRN: [id number]  Unit/Bed#: ICU 03 I Date of Admission: 9/13/2023   Date of Service: 10/16/2023 I Hospital Day: 35    Assessment/Plan   * Acute respiratory failure with hypoxia secondary to oropharyngeal mass  Assessment & Plan  Cuffless trach placed 9/17, transitioned to cuffed on 10/3. Oxygen requirements not improving. For mental health patient is on buspirone, quetiapine, and sertraline. For pain, gabapentin, oxycodone scheduled and prn, prn oxycodone mainly utilized for increased pain during chemo and after a bronch. On Guaifenesin, Atrovent and Xopenex for airway maintenance. No improvement in bilateral consolidation or atelectasis and groundglass opacities on repeat CT and chest x-rays. Patient not receiving proper positive pressure to open up atelectatic lung. Bronchial cx with 3 colonies CoNS. PCP PCR invalid, repeat negative. CTCAP indicates additional groundglass opacity with paraseptal emphysema, which may be contributing to inability to remain off vent. SBTs have been unsuccessful to place patient on trach collar since return to ICU. Patient tolerated high flow all day and overnight    Plan:  Maintain cuff overnight from 9 pm to 6 am for positive pressure, can open cuff during the day for patient to speak  Repeat Bcx if febrile  Consider repeat bronch for DAH if Hgb dropping again    Large cell lymphoma ([address])  Assessment & Plan  Large B-cell lymphoma, stage II. First chemo cycle 9/22 4 days of R-EPOCH. 9/27 Rituxan. 9/28 Filgrastim. Acyclovir, Zyloprim, Diflucan, Levaquin, Zofran, Bactrim for chemo prophylaxis, all discontinued with heme-onc's approval as Nafisa reached 4200. I suspect leukocytosis is secondary to the filgrastim. Restarted all ppx 10/13 for [facility] cycle 2 which will run for the next 4 days.  Ppx and filgrastim can be discontinued when patient is no longer neutropenic after this cycle again. Platelets have been up trending to 696, which did not happen during cycle 1. This could simply be reactive from inflammation, but could also occur from vincristine which is part of current regimen in combination with the residual effect from filgastrim. Plan:  Keep Hgb>7 and Plt>20 for chemo  Check blood smear, ferritin, ESR, CRP, Jak2, CALR, MPL, BCR-ABL1  See acute respiratory failure above    Oropharyngeal mass  Assessment & Plan  9/14 Neck/ soft tissue CT: Large enhancing soft tissue mass identified within the left neck involving multiple spaces. Centered within the left oropharynx with extensions as described above into multiple spaces. This results in significant airway compromise. 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 Small level 3/level 4 nodes are seen within the neck. Tracheostomy by ENT, bx & addition testing performed. Replaced on 9/26. H/o tobacco abuse, daily smoker. On nicotine while inpatient, confirmed with patient she needs nicotine patch. CT soft tissue neck shows reduced mass effect from 9/14 to 10/13    Plan:  ENT and heme onc following  Will titrate NRT weekly  See acute respiratory failure and large B-cell lymphoma above      (HFpEF) heart failure with preserved ejection fraction [facility])  Assessment & Plan  Wt Readings from Last 3 Encounters:   10/16/23 80.4 kg (177 lb 4 oz)   09/07/23 74.6 kg (164 lb 6.4 oz)   08/30/23 73.3 kg (161 lb 9.6 oz)     Lab Results   Component Value Date    LVEF 60 08/28/2023     (H) 09/29/2023     (H) 09/13/2023     Echo 8/28/23: LVEF 60%. Plan:  Mg > 2 and K > 4  Measure I/O      Angioedema  Assessment & Plan  POA in [place] ED: Swollen tongue, soft and hard palate with severe angioedema. Decadron 10 mg, Benadryl 25 mg given. Initially refused but eventually agreed to intubation. 2/2 oropharyngeal mass compression.     Plan:  Cuffless trach 9/17, transitioned to cuffed 10/3  See acute respiratory failure with hypoxia and oropharyngeal mass above    Ambulatory dysfunction  Assessment & Plan  Impacted by chronic pain syndrome, but medication regimen may also contribute. Patient currently requires tracheostomy with ICU level care and will be inpatient for R-EPOCH cycle 2 10/13 to 10/17. Continue OOB with PT. Need LTACH placement via CM    Pain syndrome, chronic  Assessment & Plan  Home regimen: Oxycodone 5 mg BID, Tylenol 650 mg q8 prn. Gabapentin 600 mg TID. Medical marijuana. Lumbar radiculopathy and impaired ambulatory dysfunction secondary to pain. Has bowel regimen and is having bowel movements daily. Patient required additional pain management during previous cycle    Plan:  Continue gabapentin 300 mg TID, oxycodone 5 mg TID, prn Tylenol 650 mg q6. Oxycodone 2.5 mg prn     Benign essential hypertension  Assessment & Plan  Home regimen Coreg 12.5 mg BID, Amlodipine 10 mg daily, and lisinopril 40 mg. All discontinued at this time secondary to hypotension and PO since cycle 1. but stable currently without any antihypertensives. Systolic persistently elevated and tachycardic, potentially secondary to pain. Patient was more hypotensive during last chemo. Coreg contraindicated during chemo. Patient with BP > 170s in past 12 hours    Plan:  Monitor vitals. Restart Norvasc 10  Prn hydralazine for SBP > 160    Depression  Assessment & Plan  Patient on Zoloft 75 daily and Seroquel evening. Patient is depressed, family meeting with palliative on 10/5 for goals of care. Patient is firm that she wants to live and to fight, she is just tired. Generalized abdominal pain  Assessment & Plan  Patient reports abdominal pain which is unchanged since 9/22 no guarding on exam. Takes Famotidine for GERD at home. Alk phos 10/2 145, 10/16 130.  CTCAP reveals complex right upper pole renal lesion requiring possible outpatient MRI    - Continue Famotidine  - On bowel regimen with Senna and Miralax prn    Chronic Superficial thrombophlebitis  Assessment & Plan  Right forearm soreness. RUE US: No acute or chronic deep vein thrombosis. 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 No longer requiring renal dosing as PO resolved    -Continue DVT ppx with Lovenox 40    Blister (nonthermal) of left forearm, sequela  Assessment & Plan  Blisters of left upper extremity healing, no signs of infection, continue daily wound care    CKD (chronic kidney disease) stage 3, GFR 30-59 ml/min [facility])  Assessment & Plan  Lab Results   Component Value Date    EGFR 74 10/16/2023    EGFR 78 10/15/2023    EGFR 59 10/14/2023    CREATININE 0.80 10/16/2023    CREATININE 0.77 10/15/2023    CREATININE 0.97 10/14/2023     Baseline Cr appears to be between 0.75-1.1. Patient received 2 doses of Bumex IV 2 g as she had not been responding appropriately to IV 40 Lasix daily. Creatinine went up by 0.5 meeting criteria for an PO. This may be prerenal intrinsic or postrenal.  Potential prerenal causes include reduced kidney perfusion due to lisinopril. Intrinsic can also be lisinopril due to ATN, AIN from chemo medications, TLS, crystaline nephropathy from acyclovir, rituxan nephrotoxicity, filgrastim glomerulonephritis. Post renal obstructive could be from urine retention from vincristine or cyclophosphamide bladder fibrosis. Suspect most likely due to medications as a combination of prerenal and intrinsic. FENa was 0.2%, UA shows no casts or signs of infection, urine eosinophils 0%. Patient likely has prerenal PO from volume depletion. Received 2 L NS and 1 pRBC and cr went up by 0.07 still and Na and Cl went down, suspect that volume prevented further cr rise and free water excretion impaired by kidney function, allowing hyponatremia to increase. Creatinine increase is likely plateaued and went down the following day.  Suspect that now that nephrotoxic medications have been stopped she will continue to respond well to Lasix and creatinine will continue to downtrend. PO now resolved. Will be cautious about nephrotoxins and monitor as restarting EPOCH and ppx. Patient gets volume depleted and overloaded very easily. Will likely be depleted from chemo. Cr at baseline          ICU Core Measures     A: Assess, Prevent, and Manage Pain Has pain been assessed? Yes  Need for changes to pain regimen? No   B: Both SAT/SAT  N/A   C: Choice of Sedation RASS Goal: 0 Alert and Calm or N/A patient not on sedation  Need for changes to sedation or analgesia regimen? No   D: Delirium CAM-ICU: Negative   E: Early Mobility  Plan for early mobility? No   F: Family Engagement Plan for family engagement today? No       Antibiotic Review: Continue broad spectrum secondary to severity of illness. Review of Invasive Devices:            Prophylaxis:  VTE VTE covered by:  enoxaparin, Subcutaneous, 40 mg at 10/15/23 1001       Stress Ulcer  covered byfamotidine (PEPCID) oral suspension 40 mg [[id number]], pantoprazole (PROTONIX) injection 40 mg [[id number]]          Subjective   HPI/24hr events:   Patient has remained on high flow over night without issue. Today is Day 4 of R EPOCH cycle 2. Patient is feeling nauseous from the chemo this morning and did not sleep well. Cuff was still up when I saw patient. Review of Systems   Constitutional:  Negative for fatigue and fever. Respiratory:  Negative for cough, choking and shortness of breath. Cardiovascular:  Negative for chest pain, palpitations and leg swelling. Gastrointestinal:  Positive for nausea. Genitourinary:  Positive for frequency.           Objective                            Vitals I/O      Most Recent Min/Max in 24hrs   Temp 98.7 °F (37.1 °C) Temp  Min: 98.7 °F (37.1 °C)  Max: 98.8 °F (37.1 °C)   Pulse 92 Pulse  Min: 81  Max: 109   Resp (!) 28 Resp  Min: 10  Max: 28   /85 BP  Min: 161/85  Max: 193/101   O2 Sat 100 % SpO2  Min: 85 %  Max: 100 %      Intake/Output Summary (Last 24 hours) at 10/16/2023 Amelia filed at 10/16/2023 0601  Gross per 24 hour   Intake 2432 ml   Output 2928 ml   Net -496 ml         Diet Enteral/Parenteral; Tube Feeding No Oral Diet; Two Telly HN; Continuous; 38; 190; Water; Every 4 hours     Invasive Monitoring Physical exam    Physical Exam  Constitutional:       Appearance: She is ill-appearing. HENT:      Head: Normocephalic and atraumatic. Cardiovascular:      Rate and Rhythm: Normal rate and regular rhythm. Heart sounds: Normal heart sounds. No murmur heard. Pulmonary:      Effort: Pulmonary effort is normal.      Breath sounds: Rhonchi present. Musculoskeletal:      Right lower leg: No edema. Left lower leg: No edema. Skin:     Coloration: Skin is not jaundiced or pale. Neurological:      Mental Status: She is alert and oriented to person, place, and time.            Diagnostic Studies      Imaging: CXR 10/16 results pending but appears unchanged from 10/11 I have personally reviewed pertinent films in PACS     Medications:  Scheduled PRN   acyclovir, 400 mg, BID  busPIRone, 30 mg, TID  chlorhexidine, 15 mL, Q12H [facility]  [START ON 10/17/2023] cyclophosphamide (CYTOXAN) 1,350 mg in sodium chloride 0.9 % 250 mL IVPB, 750 mg/m2 (Treatment Plan Recorded), Once  DOXOrubicin (ADRIAMYCIN) 18 mg, etoposide (TOPOSAR) 67.6 mg, vinCRIStine (ONCOVIN) 0.7 mg in sodium chloride 0.9 % 500 mL infusion, , Q24H  enoxaparin, 40 mg, Q24H ASHLEY  famotidine, 20 mg, BID  fluconazole, 200 mg, Daily  gabapentin, 300 mg, TID  levalbuterol, 1.25 mg, TID   And  ipratropium, 0.5 mg, TID  levofloxacin, 250 mg, Q24H ASHLEY  magnesium sulfate, 2 g, Once  nicotine, 14 mg, Daily   Followed by  nicotine, 7 mg, Daily  ondansetron (ZOFRAN) 16 mg, dexamethasone (DECADRON) 10 mg in sodium chloride 0.9 % 50 mL IVPB, , Q24H  oxyCODONE, 5 mg, Q8H  predniSONE, 60 mg/m2 (Treatment Plan Recorded), BID With Meals  QUEtiapine, 50 mg, HS  senna, 8.8 mg, HS  sertraline, 75 mg, Daily  [START ON 10/17/2023] sodium chloride, 2,000 mL, Once  sulfamethoxazole-trimethoprim, 1 tablet, Once per day on Mon Wed Fri      acetaminophen, 650 mg, Q6H PRN  alteplase, 2 mg, Q1MIN PRN  guaiFENesin, 200 mg, Q6H PRN  ondansetron, 4 mg, Q8H PRN  ondansetron, 4 mg, Q8H PRN  oxyCODONE, 2.5 mg, Q6H PRN  polyethylene glycol, 17 g, Daily PRN  sodium chloride, 20 mL/hr, Daily PRN  sodium chloride, 4 mL, Q6H PRN       Continuous          Labs:    CBC    Recent Labs     10/15/23  0517 10/16/23  0448   WBC 15.56* 10.02   HGB 8.4* 9.3*   HCT 26.5* 29.7*   * 696*     BMP    Recent Labs     10/15/23  0517 10/16/23  0448   SODIUM 138 138   K 4.5 4.2    99   CO2 32 31   AGAP 6 8   BUN 31* 34*   CREATININE 0.77 0.80   CALCIUM 9.5 9.7       Coags    No recent results     Additional Electrolytes  Recent Labs     10/16/23  0448   MG 1.9          Blood Gas    No recent results  No recent results LFTs  Recent Labs     10/15/23  0517 10/16/23  0448   ALT 37 39   AST 31 27   ALKPHOS 138* 130*   ALB 2.9* 3.1*   TBILI 0.29 0.34       Infectious  No recent results  Glucose  Recent Labs     10/15/23  0517 10/16/23  0448   GLUC 143* 164*                   Rebeca Hagan MD

## 2023-10-16 NOTE — ASSESSMENT & PLAN NOTE
Home regimen Coreg 12.5 mg BID, Amlodipine 10 mg daily, and lisinopril 40 mg. All discontinued at this time secondary to hypotension and PO since cycle 1. but stable currently without any antihypertensives. Systolic persistently elevated and tachycardic, potentially secondary to pain. Patient was more hypotensive during last chemo. Coreg contraindicated during chemo, since cycles are every other week, would be better to stick with lopressor during duration of therapy as opposed to switching constantly. Plan:  Monitor vitals.   Continue Norvasc 10 and lopressor 25 bid  Prn hydralazine for SBP > 160

## 2023-10-16 NOTE — ASSESSMENT & PLAN NOTE
Lab Results   Component Value Date    EGFR 64 10/20/2023    EGFR 44 10/19/2023    EGFR 65 10/18/2023    CREATININE 0.91 10/20/2023    CREATININE 1.22 10/19/2023    CREATININE 0.89 10/18/2023     Baseline Cr between 0.75-1.1  Initial HERIBERTO felt 2/2 prerenal azotemia 2/2 diuresis, reduced PO intake +/- ATN. Patient received 2 doses of Bumex IV 2 g as she had not been responding appropriately to IV 40 Lasix daily. Creatinine went up by 0.5 meeting criteria for an HERIBERTO. This may be prerenal intrinsic or postrenal.  Potential prerenal causes include reduced kidney perfusion due to lisinopril. Intrinsic can also be lisinopril due to ATN, AIN from chemo medications, TLS, crystaline nephropathy from acyclovir, rituxan nephrotoxicity, filgrastim glomerulonephritis. Post renal obstructive could be from urine retention from vincristine or cyclophosphamide bladder fibrosis. Suspect most likely due to medications as a combination of prerenal and intrinsic. FENa was 0.2%, UA shows no casts or signs of infection, urine eosinophils 0%. Patient likely has prerenal HERIBERTO from volume depletion. Received 2 L NS and 1 pRBC and cr went up by 0.07 still and Na and Cl went down, suspect that volume prevented further cr rise and free water excretion impaired by kidney function, allowing hyponatremia to increase. Creatinine increase is likely plateaued and went down the following day. Suspect that now that nephrotoxic medications have been stopped she will continue to respond well to Lasix and creatinine will continue to downtrend. HERIBERTO now resolved. Will be cautious about nephrotoxins and monitor as restarting EPOCH and ppx. Patient gets volume depleted and overloaded very easily. Especially from chemo. 10/19 Heriberto as cr went up by 0.3 in 48 hours from 0.82 on 10/17 to 1.22 on 10/19. Suspect this is prerenal hypovolemic, will see if patient improves with fluids. She gets overloaded easily so if no improvement suspect CRS again.  Received 2L bolus NS. Today cr down to 0.91, so was likely hypovolemic prerenal    Plan: Continue to monitor UO/renal function.  Avoid nephrotoxic agents

## 2023-10-16 NOTE — ASSESSMENT & PLAN NOTE
9/14 Neck/ soft tissue CT: Large enhancing soft tissue mass identified within the left neck involving multiple spaces. Centered within the left oropharynx with extensions as described above into multiple spaces. This results in significant airway compromise. 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 Small level 3/level 4 nodes are seen within the neck. Tracheostomy by ENT. Replaced on 9/26. H/o tobacco abuse, daily smoker. On nicotine while inpatient, confirmed with patient she needs nicotine patch. CT soft tissue neck shows reduced mass effect from 9/14 to 10/13.  Can consider reducing trach size if continues to improve    Plan:  ENT and heme onc following  Will titrate NRT weekly  See acute respiratory failure and large B cell lymphoma above

## 2023-10-16 NOTE — ASSESSMENT & PLAN NOTE
Large B-cell lymphoma, stage II. First chemo cycle 9/22 4 days of R-EPOCH. 9/27 Rituxan. 9/28 Filgrastim. Acyclovir, Zyloprim, Diflucan, Levaquin, Zofran, Bactrim for chemo prophylaxis, all discontinued with heme-onc's approval as Nafisa reached 4200. I suspect leukocytosis is secondary to the filgrastim. Restarted all ppx 10/13 for [facility] cycle 2 which will run for the next 4 days. Ppx and filgrastim can be discontinued when patient is no longer neutropenic after this cycle again. Platelets have been up trending to 696, which did not happen during cycle 1. This could simply be reactive from inflammation, but could also occur from vincristine which is part of current regimen in combination with the residual effect from filgastrim. Likely just reactive but if continues to rise consider checking blood smear, ferritin, ESR, CRP, Jak2, CALR, MPL, BCR-ABL1. Glucose is increasing on past 2 morning draws, appears to be 2/2 prednisolone from regimen. PICC came out with 3 hours chemo remaining. Tunneled line in R IJ placed yesterday and received cytoxan. Today benadryl with rituxan.  Neutropenic precautions will be needed in a few days    Plan:  Keep Hgb>7 and Plt>20 for chemo  See acute respiratory failure above

## 2023-10-16 NOTE — ASSESSMENT & PLAN NOTE
Cuffless trach placed 9/17, transitioned to cuffed on 10/3. Oxygen requirements not improving. For mental health patient is on buspirone, quetiapine, and sertraline. For pain, gabapentin, oxycodone scheduled and prn, prn oxycodone mainly utilized for increased pain during chemo and after a bronch. On Guaifenesin, Atrovent and Xopenex for airway maintenance. No improvement in bilateral consolidation or atelectasis and groundglass opacities on repeat CT and chest x-rays. Patient not receiving proper positive pressure to open up atelectatic lung. Bronchial cx with 3 colonies CoNS. PCP PCR invalid, repeat negative. CTCAP indicates additional groundglass opacity with paraseptal emphysema, which may be contributing to inability to remain off vent. SBTs have been unsuccessful to place patient on trach collar since return to ICU. Patient tolerated high flow all day and overnight. Back on vent overnight, failed trach collar o/n. CXR 10/19 appears unchanged from 10/16 just not good breath during image, less of right lung visualized this time.  Did well overnight on 6/8 consider CPAP vs overnight trach collar trial    Plan:  Maintain cuff overnight from 9 pm to 6 am for positive pressure, can open cuff during the day for patient to speak  6/8 tonight, can attempt CPAP next

## 2023-10-16 NOTE — RESPIRATORY THERAPY NOTE
10/16/23 0842   Respiratory Assessment   Resp Comments   (Pt desated, required tracheal suctioning, changed inner cannula to new, Pt now sating in the 90s.)   Airway Suctioning/Secretions   Suction Type Tracheal   Suction Device Catheter   Secretion Amount Moderate   Secretion Color Yellow   Secretion Consistency Thick   Suction Tolerance Tolerated fairly well  (coughing fits after)   Suctioning Adverse Effects None   Surgical Airway Shiley Cuffed   Placement Date/Time: 09/17/23 0848   Tube Size: 7 mm  Placed By: Physician  Placed by (Name): Dr. Clotilde North  Type: Tracheostomy  Brand: Federico  Style: (c) Cuffed  Comments: [id number]   Status Cuff Deflated   Site Assessment No bleeding;Dry;Clean   Site Care Open to air   Inner Cannula Care Changed/new   Ties Assessment Dry;Clean;Secure   Equipment at bedside BVM; Wall Suction setup; Additional complete same size trach tube; Additional complete one size smaller trach tube;Sterile saline; Obturator; Additional inner cannula

## 2023-10-16 NOTE — ASSESSMENT & PLAN NOTE
POA in [facility] ([place] ED: Swollen tongue, soft and hard palate with severe angioedema. Decadron 10 mg, Benadryl 25 mg given. 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 Plan:  Cuffless trach 9/17, cuffed Shiley XLT #7 10/3  See acute respiratory failure and oropharyngeal mass above  Continue to wean vent.

## 2023-10-16 NOTE — ASSESSMENT & PLAN NOTE
Patient on Zoloft 75 daily and Seroquel hs  Patient is depressed, multiple family/palliative discussions. atient is firm that she wants to live and to fight, she is just tired. Currently goal is disease treatment oriented. Full code. No SI/HI ideations. Palliative signed off, will reconsult if patient changes her mind regarding wishes.

## 2023-10-16 NOTE — ASSESSMENT & PLAN NOTE
Right forearm soreness. RUE US: No acute or chronic deep vein thrombosis. 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  PO not severe enough to require renal dosing at this time, will continue to monitor and adjust dosing as needed.      -Continue DVT ppx with Lovenox 40

## 2023-10-16 NOTE — ASSESSMENT & PLAN NOTE
2/2 Impacted by chronic pain syndrome + prolonged hospital stay. Continue OOB with PT. PT rec post acute rehab however reportedly Cannot get LTACH placement while getting chemo per CM  She is aware STR SNFs continue to follow and treatment can be done from a SNF level of care. PT would need to be on trach collar for at least 48 hours with no need for the vent.  Aware that pt would need to be around 28% FiO2

## 2023-10-16 NOTE — ASSESSMENT & PLAN NOTE
Patient reports abdominal pain which is unchanged since 9/22 no guarding on exam. Takes Famotidine for GERD at home. Alk phos 10/2 145, 10/20 79.  CTCAP reveals complex right upper pole renal lesion requiring possible outpatient MRI    - Continue Famotidine  - On bowel regimen with Senna and Miralax prn

## 2023-10-16 NOTE — PROGRESS NOTES
Medical Oncology/Hematology Progress Note  Keven Dwyer, female, 77 y. 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 CT AP with no lymphadenopathy; Brain MRI no lesion; stage II Large B Cell Lymphoma. She is also s/p IR gastrostomy tube placement on 9/27/23. She started her dose-adjusted systemic treatment, R-EPOCH, on 9/22/23 with the last dose on 9/25/23. She received Rituxan infusion on 9/27/23. Her CMP and uric acid have been unremarkable for tumor lysis. CT Soft Tissue Neck (10/16/23) showed improving multi spatial mass centered in the left nasopharynx/oropharynx. Again the mass extends up to the skull base in the infratemporal fossa with loss of perineural fat at the left foramen ovale. Decreased mass effect on the airway. Of note, she is on Day 4, Cycle 2 of EPOCH-R. Overall, patient tolerating chemotherapy very well. Patient continues to be on cuffed trach. Assessment and Plan  1. 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 Today (10/16/23) is Day 4.  -Plan for rituximab, immunotherapy, on 10/17/23    -S/p Cycle 1 EPOCH (etoposidevincristine, cyclophosphamide, doxorubicin) on 9/25/23, and rituxamab on 9/27/23. Started with growth factor on 9/28/23. Filgrastim (Neupogen) ordered at 5 mg/kg (375 mg) rounded to 300 mcg. Discontinued as of 10/6/23.    2. Left Jugular Lymphadenopathy     3. Leukocytosis   -Improved.  Now back to normal values  -Likely a derivative from growth factor  -WBC 10.02 (10/16) < 15.56 < 17.32 (10/12) < 15.42 (10/10)   - ANC 17.01 (10/14) < 11.26 (10/12) <10.18 <  9.44 < 8.09 < 0.94 < 0.12 < 0.02 < 0.00  -Continues to be afebrile  -Started with growth factor, Neupogen, on 9/28/23, d/c on 10/6/23      4. Thrombocytosis  Platelet trend: 733 (10/16) < 576 < 460 (10/14) < 331 (10/13)  -Platelets normal on 7345/59. Most likely reactive    PLAN:  -Patient is tolerating chemotherapy very well. Continue to monitor for tumor lysis, renal and hepatic functions.   -Chemo ppx in place: levofloxacin, fluconazole, acyclovir, Bactrim    -CMP and CBC with differential, uric acid daily.   -Transfuse for hemoglobin <7 g/dL. -Rituxan planned for tomorrow. Will start growth factor (Nivestym) 24 hours after the conclusion of chemotherapy administration    Labs:  CMP (1016/23)  Corrected calcium 10.4  Potassium 4.2  Creatinine 0.80    CBC  Hemoglobin 9.3 < 8.4 < 7.6 (10/12) <8.3   Platelets 900 < 994 < 460 < 234 (10/12) <182 <167 < 127< 77 (10/4) <61    Imaging:  CT Soft Tissue Neck (10/16/23) - Improving multi spatial mass centered in the left nasopharynx/oropharynx. Again the mass extends up to the skull base in the infratemporal fossa with loss of perineural fat at the left foramen ovale. Decreased mass effect on the airway. No adenopathy by size criteria. Bilateral effusions and extensive airspace disease is stable. 9/30/23 CT PE study Abdomen/pelvis- Hilar and mediastinal lymphadenopathy similar to prior. No acute findings in the abdomen or pelvis. Chronic lytic lesion in T12 vertebral body gradually progressing since 2013 with chronic pathologic fracture. 9/13/23 CTA Chest Rt middle lobe consolidation (PNA), slight regression of previous hilar and mediastinal LNs (had recent PNA in August 2023)      9/14/23 CT Soft Tissue Neck w contrast: soft tissue mass ~ 6.1 x 4.7 x 5.2 cm appears to be centered within Lt oropharynx involving multiple spaces and extends into the nasopharynx. .. multiple small jugular chain nodes on the left. 9/17/23 nasopharynx mass biopsy initial results positive for malignancy favor poorly differentiated carcinoma. Cannot exclude lymphoma.  Flow cytometry pending. Please do not hesitate to reach out for questions or concerns. Stacey Moraes, [address], [address] [facility]  Hematology-Oncology    Chemotherapy HX:  Started growth factor on 9/28/23 with last day on 10/6/23. WBC decreased at 0.21 with absolute neutrophil count at 0.00 on 10/2/23. Commenced with neutropenic precautions. Patient continued to afebrile then, no complaints of bone pain from growth factor injections since 9/28/23. Review of Systems   Constitutional:  Positive for fatigue. Negative for chills, diaphoresis and fever. HENT:  Negative for ear pain and sore throat. Neck mass    Eyes:  Negative for pain and visual disturbance. Respiratory:  Negative for cough, chest tightness, shortness of breath and wheezing. Cardiovascular:  Negative for chest pain and palpitations. Gastrointestinal:  Negative for abdominal pain, blood in stool, diarrhea, nausea and vomiting. Genitourinary:  Negative for difficulty urinating, dysuria and hematuria. Musculoskeletal:  Negative for arthralgias and back pain. Skin:  Negative for color change and rash. Neurological:  Positive for weakness. Negative for dizziness, seizures, syncope and headaches. Psychiatric/Behavioral:  Negative for agitation and decreased concentration. The patient is nervous/anxious. All other systems reviewed and are negative.     Objective:     Medication Administration - last 24 hours from 10/15/2023 1325 to 10/16/2023 1325         Date/Time Order Dose Route Action Action by     10/16/2023 0811 EDT chlorhexidine (PERIDEX) 0.12 % oral rinse 15 mL 15 mL Mouth/Throat Given Tiffanie Howe RN     10/15/2023 2206 EDT chlorhexidine (PERIDEX) 0.12 % oral rinse 15 mL 15 mL Mouth/Throat Given Sebree Opal     10/16/2023 0810 EDT busPIRone (BUSPAR) tablet 30 mg 30 mg Oral Given Tiffanie Howe RN     10/15/2023 2206 EDT busPIRone (BUSPAR) tablet 30 mg 30 mg Oral Given Sebree Carrollton     10/15/2023 1731 EDT busPIRone (BUSPAR) tablet 30 mg 30 mg Oral Given Darien Sabillon, RN     10/16/2023 1457 EDT gabapentin (NEURONTIN) capsule 300 mg 300 mg Oral Given Jatinder Aleman, RN     10/15/2023 2206 EDT gabapentin (NEURONTIN) capsule 300 mg 300 mg Oral Given Suzette Amaya     10/15/2023 1731 EDT gabapentin (NEURONTIN) capsule 300 mg 300 mg Oral Given Darien Sabillon, RN     10/15/2023 2237 EDT senna oral syrup 8.8 mg 8.8 mg Per NG Tube Not Given Suzette Amaya     10/16/2023 0810 EDT famotidine (PEPCID) tablet 20 mg 20 mg Oral Given Jatinder Aleman, RN     10/15/2023 1731 EDT famotidine (PEPCID) tablet 20 mg 20 mg Oral Given Darien Sabillon, RN     10/16/2023 1331 EDT sertraline (ZOLOFT) tablet 75 mg 75 mg Per PEG Tube Given Jatinder Aleman, TINO     10/15/2023 2206 EDT QUEtiapine (SEROquel) tablet 50 mg 50 mg Oral Given Suzette Amaya     10/16/2023 0812 EDT nicotine (NICODERM CQ) 7 mg/24hr TD 24 hr patch 7 mg 7 mg Transdermal Medication Applied Jatinder Aleman, TINO     10/16/2023 1002 EDT nicotine (NICODERM CQ) 14 mg/24hr TD 24 hr patch 14 mg 14 mg Transdermal Patch Removed Jatinder Aleman, TINO     10/16/2023 8442 EDT enoxaparin (LOVENOX) subcutaneous injection 40 mg 40 mg Subcutaneous Given Jatinder Aleman, TINO     10/16/2023 9216 EDT levalbuterol Yadi ) inhalation solution 1.25 mg 1.25 mg Nebulization Given Radha Lewis, RT     10/15/2023 2058 EDT levalbuterol (Yadi ) inhalation solution 1.25 mg 1.25 mg Nebulization Given Luis Antonio Peck, RT     10/15/2023 1359 EDT levalbuterol (Yadi ) inhalation solution 1.25 mg 1.25 mg Nebulization Given Petra Pablo, RT     10/16/2023 5727 EDT ipratropium (ATROVENT) 0.02 % inhalation solution 0.5 mg 0.5 mg Nebulization Given Pauloelizabeth Debbie, RT     10/15/2023 2058 EDT ipratropium (ATROVENT) 0.02 % inhalation solution 0.5 mg 0.5 mg Nebulization Given Luis Antonio Peck, RT     10/15/2023 1358 EDT ipratropium (ATROVENT) 0.02 % inhalation solution 0.5 mg 0.5 mg Nebulization Given Petra Pablo, RT     10/16/2023 1143 EDT oxyCODONE (ROXICODONE) oral solution 5 mg 5 mg Oral Given Apolonia Gordon, RN     10/16/2023 0448 EDT oxyCODONE (ROXICODONE) oral solution 5 mg 5 mg Oral Given Ala Service     10/15/2023 2206 EDT oxyCODONE (ROXICODONE) oral solution 5 mg 5 mg Oral Given Ala Service     10/16/2023 6927 EDT levofloxacin (LEVAQUIN) tablet 250 mg 250 mg Oral Given Apolonia Fruits, RN     10/16/2023 8890 EDT fluconazole (DIFLUCAN) tablet 200 mg 200 mg Oral Given Apoloniakimberly Gordon, RN     10/16/2023 1337 EDT acyclovir (ZOVIRAX) capsule 400 mg 400 mg Oral Given Apolonia Gordon, RN     10/15/2023 1731 EDT acyclovir (ZOVIRAX) capsule 400 mg 400 mg Oral Given Greg Alonso, RN     10/16/2023 3025 EDT sulfamethoxazole-trimethoprim (BACTRIM DS) 800-160 mg per tablet 1 tablet 1 tablet Oral Given Apoloniakimberly Gordon, RN     10/16/2023 3055 EDT predniSONE tablet 100 mg 100 mg Oral Given Apolonia Gordon, RN     10/15/2023 1731 EDT predniSONE tablet 100 mg 100 mg Oral Given Greg Canseco, RN     10/16/2023 0801 EDT ondansetron (ZOFRAN) 16 mg, dexamethasone (DECADRON) 10 mg in sodium chloride 0.9 % 50 mL IVPB -- Intravenous [address] [facility], RN     10/16/2023 6004 EDT DOXOrubicin (ADRIAMYCIN) 18 mg, etoposide (TOPOSAR) 67.6 mg, vinCRIStine (ONCOVIN) 0.7 mg in sodium chloride 0.9 % 500 mL infusion -- Intravenous [address] [facility], RN     10/16/2023 5757 EDT sodium chloride 3 % inhalation solution 4 mL 4 mL Nebulization Given Jaskaran Fonseca, RT     10/15/2023 2058 EDT sodium chloride 3 % inhalation solution 4 mL 4 mL Nebulization Given Paddy Evans, RT     10/16/2023 0802 EDT magnesium sulfate 2 g/50 mL IVPB (premix) 2 g 2 g Intravenous [address] [facility], RN     10/16/2023 1004 EDT amLODIPine (NORVASC) tablet 10 mg 10 mg Oral Given Apolonia Gordon, RN            /76   Pulse 100   Temp 98.6 °F (37 °C) (Oral)   Resp 21   Ht 5' 1" (1.549 m)   Wt 80.4 kg (177 lb 4 oz)   SpO2 95%   BMI 33.49 kg/m²       Physical Exam  Constitutional:       Appearance: She is not ill-appearing. HENT:      Head: Normocephalic and atraumatic. Comments: Trach cuff in place     Right Ear: External ear normal.      Left Ear: External ear normal.      Nose: No congestion or rhinorrhea. Mouth/Throat:      Mouth: Mucous membranes are moist.   Eyes:      Extraocular Movements: Extraocular movements intact. Conjunctiva/sclera: Conjunctivae normal.      Pupils: Pupils are equal, round, and reactive to light. Neck:      Comments: cuffed Federico XLT #7 on 10/3/23  Cardiovascular:      Rate and Rhythm: Regular rhythm. Tachycardia present. Pulses: Normal pulses. Heart sounds: No murmur heard. No gallop. Pulmonary:      Effort: Pulmonary effort is normal. No respiratory distress. Breath sounds: No wheezing, rhonchi or rales. Comments: Vented  Abdominal:      General: Bowel sounds are normal. There is no distension. Palpations: There is no mass. Tenderness: There is no abdominal tenderness. There is no guarding. Comments: Peg tube in place, on tube feeding   Musculoskeletal:      Right lower leg: No edema. Left lower leg: No edema. Skin:     General: Skin is warm. Capillary Refill: Capillary refill takes less than 2 seconds. Coloration: Skin is not jaundiced or pale. Findings: No rash. Neurological:      General: No focal deficit present. Mental Status: She is alert and oriented to person, place, and time. Psychiatric:         Behavior: Behavior normal.         Thought Content:  Thought content normal.         Judgment: Judgment normal.      Comments: In good spirits         Recent Results (from the past 48 hour(s))   Comprehensive metabolic panel    Collection Time: 10/15/23  5:17 AM   Result Value Ref Range    Sodium 138 135 - 147 mmol/L    Potassium 4.5 3.5 - 5.3 mmol/L    Chloride 100 96 - 108 mmol/L    CO2 32 21 - 32 mmol/L    ANION GAP 6 mmol/L    BUN 31 (H) 5 - 25 mg/dL    Creatinine 0.77 0.60 - 1.30 mg/dL    Glucose 143 (H) 65 - 140 mg/dL    Calcium 9.5 8.4 - 10.2 mg/dL    Corrected Calcium 10.4 (H) 8.3 - 10.1 mg/dL    AST 31 13 - 39 U/L    ALT 37 7 - 52 U/L    Alkaline Phosphatase 138 (H) 34 - 104 U/L    Total Protein 5.9 (L) 6.4 - 8.4 g/dL    Albumin 2.9 (L) 3.5 - 5.0 g/dL    Total Bilirubin 0.29 0.20 - 1.00 mg/dL    eGFR 78 ml/min/1.73sq m   CBC and differential    Collection Time: 10/15/23  5:17 AM   Result Value Ref Range    WBC 15.56 (H) 4.31 - 10.16 Thousand/uL    RBC 2.72 (L) 3.81 - 5.12 Million/uL    Hemoglobin 8.4 (L) 11.5 - 15.4 g/dL    Hematocrit 26.5 (L) 34.8 - 46.1 %    MCV 97 82 - 98 fL    MCH 30.9 26.8 - 34.3 pg    MCHC 31.7 31.4 - 37.4 g/dL    RDW 16.1 (H) 11.6 - 15.1 %    MPV 9.9 8.9 - 12.7 fL    Platelets 116 (H) 661 - 390 Thousands/uL   Uric acid    Collection Time: 10/15/23  5:17 AM   Result Value Ref Range    Uric Acid 3.9 2.0 - 7.5 mg/dL   LD,Blood    Collection Time: 10/15/23  5:17 AM   Result Value Ref Range     (H) 140 - 271 U/L   Magnesium    Collection Time: 10/16/23  4:48 AM   Result Value Ref Range    Magnesium 1.9 1.9 - 2.7 mg/dL   Comprehensive metabolic panel    Collection Time: 10/16/23  4:48 AM   Result Value Ref Range    Sodium 138 135 - 147 mmol/L    Potassium 4.2 3.5 - 5.3 mmol/L    Chloride 99 96 - 108 mmol/L    CO2 31 21 - 32 mmol/L    ANION GAP 8 mmol/L    BUN 34 (H) 5 - 25 mg/dL    Creatinine 0.80 0.60 - 1.30 mg/dL    Glucose 164 (H) 65 - 140 mg/dL    Calcium 9.7 8.4 - 10.2 mg/dL    Corrected Calcium 10.4 (H) 8.3 - 10.1 mg/dL    AST 27 13 - 39 U/L    ALT 39 7 - 52 U/L    Alkaline Phosphatase 130 (H) 34 - 104 U/L    Total Protein 6.2 (L) 6.4 - 8.4 g/dL    Albumin 3.1 (L) 3.5 - 5.0 g/dL    Total Bilirubin 0.34 0.20 - 1.00 mg/dL    eGFR 74 ml/min/1.73sq m   CBC and differential    Collection Time: 10/16/23  4:48 AM   Result Value Ref Range    WBC 10.02 4.31 - 10.16 Thousand/uL    RBC 3.12 (L) 3.81 - 5.12 Million/uL    Hemoglobin 9.3 (L) 11.5 - 15.4 g/dL    Hematocrit 29.7 (L) 34.8 - 46.1 % MCV 95 82 - 98 fL    MCH 29.8 26.8 - 34.3 pg    MCHC 31.3 (L) 31.4 - 37.4 g/dL    RDW 15.4 (H) 11.6 - 15.1 %    MPV 9.7 8.9 - 12.7 fL    Platelets 842 (H) 406 - 390 Thousands/uL    nRBC 0 /100 WBCs   Uric acid    Collection Time: 10/16/23  4:48 AM   Result Value Ref Range    Uric Acid 4.4 2.0 - 7.5 mg/dL   LD,Blood    Collection Time: 10/16/23  4:48 AM   Result Value Ref Range     (H) 140 - 271 U/L       US right upper quadrant    Result Date: 9/22/2023  Narrative: RIGHT UPPER QUADRANT ULTRASOUND INDICATION:     CT finding N/V +Cancino. COMPARISON: CT abdomen/pelvis 9/21/2023 TECHNIQUE:   Real-time ultrasound of the right upper quadrant was performed with a curvilinear transducer with both volumetric sweeps and still imaging techniques. FINDINGS: Evaluation limited as per sonographer's note in PACS. PANCREAS:  Visualized portions of the pancreas are within normal limits. AORTA AND IVC:  Visualized portions are normal for patient age. LIVER: Size:  Within normal range. The liver measures 15.3 cm in the midclavicular line. Contour:  Surface contour is smooth. Parenchyma:  Echogenicity and echotexture are within normal limits. No liver mass identified. Limited imaging of the main portal vein shows it to be patent and hepatopetal. BILIARY: The gallbladder is normal in caliber. Gallbladder wall thickness upper limits of normal. No pericholecystic fluid. There is gallbladder sludge without evidence for shadowing calculi. No sonographic Cancino sign. No intrahepatic biliary dilatation. CBD measures 4.0 mm. No choledocholithiasis. KIDNEY: Right kidney measures 11.9 x 5.2 x 6.4 cm. Volume 207.6 mL Several simple cysts, the largest of which measures 4.1 cm. 2.8 cm septated cyst in the upper pole. No hydronephrosis or perinephric collection. ASCITES:   None. Impression: No cholelithiasis. Gallbladder sludge is present with wall thickness upper limits of normal. Negative sonographic Cancino sign.  Findings may be sequela of chronic gallbladder dysfunction. Consider follow-up with a HIDA scan if it would alter patient management. Workstation performed: [id number]     CT abdomen pelvis w contrast    Result Date: 9/21/2023  Narrative: CT ABDOMEN AND PELVIS WITH IV CONTRAST INDICATION:   Head/neck cancer, staging lymohoma staging prior to treatment with chemo. COMPARISON: 9/13/2023. TECHNIQUE:  CT examination of the abdomen and pelvis was performed. Multiplanar 2D reformatted images were created from the source data. This examination, like all CT scans performed in the [facility], was performed utilizing techniques to minimize radiation dose exposure, including the use of iterative reconstruction and automated exposure control. Radiation dose length product (DLP) for this visit:  813 mGy-cm IV Contrast:  100 mL of iohexol (OMNIPAQUE) Enteric Contrast:  Enteric contrast was not administered. FINDINGS: Mildly degraded by respiratory motion. ABDOMEN LOWER CHEST: There is bibasilar atelectasis. LIVER/BILIARY TREE:  Unremarkable. GALLBLADDER:  No calcified gallstones. There may be mild gallbladder wall thickening though evaluation is degraded by respiratory motion. No surrounding inflammatory changes. SPLEEN:  Unremarkable. PANCREAS:  Unremarkable. ADRENAL GLANDS:  Unremarkable. KIDNEYS/URETERS: Multiple bilateral renal cysts. No hydronephrosis. STOMACH AND BOWEL: There is colonic diverticulosis without evidence of acute diverticulitis. APPENDIX:  No findings to suggest appendicitis. ABDOMINOPELVIC CAVITY:  No ascites. No pneumoperitoneum. No lymphadenopathy. VESSELS:  Unremarkable for patient's age. PELVIS REPRODUCTIVE ORGANS:  Unremarkable for patient's age. URINARY BLADDER:  Unremarkable. ABDOMINAL WALL/INGUINAL REGIONS:  Fat containing right inguinal hernia noted. Fat-containing umbilical hernia.  OSSEOUS STRUCTURES: Again seen is a destructive lytic lesion in the T12 vertebral body with sclerotic borders, progressed from 2013 and with chronic appearing pathologic fracture. Impression: 1. No abdominal lymphadenopathy. 2.  Question gallbladder wall thickening versus motion artifact. If there is clinical concern for gallbladder pathology, ultrasound is recommended. 3.  Chronic T12 lytic lesion with pathologic fracture. Workstation performed: [id number]     MRI soft tissue neck wo and w contrast    Result Date: 9/21/2023  Narrative: MRI OF THE SOFT TISSUES OF THE NECK - WITH AND WITHOUT CONTRAST INDICATION: Oropharyngeal mass s/p biopsy (+) for carcinoma COMPARISON: Neck CT from 9/14/2023 TECHNIQUE:  Multiplanar, multisequence imaging of the soft tissues of the neck was performed before and after gadolinium administration. . IV Contrast:  7.5 mL of Gadobutrol injection (SINGLE-DOSE) IMAGE QUALITY: Motion degrades images. FINDINGS: VISUALIZED BRAIN PARENCHYMA: No acute or suspicious findings. Please see today's brain MR report. VISUALIZED ORBITS AND PARANASAL SINUSES: Globes and orbits are within normal limits. Pan paranasal sinus mucosal thickening, greatest involving ethmoids and sphenoids. NASAL CAVITY, NASOPHARYNX, and OROHYPOPHARYNX and LARYNX: Approximately 7.5 x 4.0 x 7.8 cm (length X depth X width) soft tissue mass, epicenter likely the left lateral pharyngeal recess. Enhances and restricts diffusion. Central, irregular fluid signal intensity area without enhancement appears contiguous with fluid around an endotracheal tube, likely trapped secretions and circumferential mass. Mass extends from the left greater than right nasopharynx superiorly to the cricoid cartilage inferiorly. Extends to the posterior nasal cavity. Displaces the soft palate, uvula and tongue base anteriorly. Oral cavity is otherwise within normal limits. Displaces the left pterygoid muscles anterolaterally.  Extends posterior to the angle of the mandible approximately 1.4 cm, abutting and mildly laterally displacing the deep parotid, inseparable from the gland. Effaces the left parapharyngeal fat. Discrete epiglottis not seen, grossly anteriorly displaced. Extends along the left aryepiglottic fold and posterior pharyngeal wall to the to the inferior supraglottic airway, grossly terminating just above or at the left false cord. Extends to the left carotid space, encircles the carotid with severe narrowing and posterior-lateral displacement of the distal cervical and most proximal petrous vessel. Otherwise preserved left internal carotid flow-void. Posterior-lateral displacement  and occlusion of the internal jugular vein at the level of the angle of the mandible in the distal neck. Mass extends to the proximal jugular foramen. Laryngeal ventricles and true cords appear preserved. Normal fat signal  preserved in the cricoid and thyroid cartilages. Enteric and endotracheal tubes are displaced rightward. Trace retropharyngeal effusion. THYROID GLAND:   Within normal limits. PAROTID AND SUBMANDIBULAR GLANDS: Both submandibular and the right parotid glands are within normal limits. Deep left parotid involvement mentioned above. LYMPH NODES: Similar small jugular chain nodes more numerous on the left than the right, but none considered pathologically enlarged. 0.7 x 0.5 cm suspicious right retropharyngeal node (4/27). Left retropharyngeal space is obliterated by mass, no separate adenopathy. VASCULAR STRUCTURES: Left carotid a jugular involvement described above. Right carotid artery and jugular veins are within normal limits. THORACIC INLET: Similar to CT. Dependent lung opacities. CERVICAL SPINE: Normal appearance. OTHER: Left greater than right mastoid effusions with patchy left enhancement and restricted diffusion were described in today's brain MR report. Asymmetric opacity involves the left petrous apex. Normal appearance of the IACs and inner ear structures. No cerebellopontine angle mass. Impression: Known, large left neck mass described in detail above. Workstation performed: [id number]     MRI brain w wo contrast    Result Date: 9/21/2023  Narrative: MRI BRAIN WITHOUT AND WITH CONTRAST INDICATION:  Oropharyngeal mass s/p biopsy (+) for carcinoma . COMPARISON: 6/17/2017 CT of the brain TECHNIQUE:  Multiplanar/multisequence MRI of the brain was performed administration of contrast. IV Contrast:  7.5 mL of Gadobutrol injection (SINGLE-DOSE) IMAGE QUALITY:  Diagnostic. FINDINGS: BRAIN PARENCHYMA: No  hemorrhage, extra-axial fluid collection, acute infarct, mass effect or midline shift. Mild, focal patchy periventricular and subcortical white matter foci of abnormal T2 and FLAIR hyperintensity are nonspecific, but usually secondary to changes of microangiopathy. Small developmental venous angioma in the left posterior frontal lobe, typically clinically insignificant. No suspicious enhancement or masses. VENTRICLES:  Within normal limits for age. SELLA AND PITUITARY GLAND:  Within normal limits. ORBITS:  Within normal limits. PARANASAL SINUSES: Mucosal thickening. VASCULATURE: Preserved skull base flow voids. Normal enhancement. CALVARIUM AND SKULL BASE: Bilateral mastoid effusions are likely secondary mass, related to eustachian tube obstruction from nasopharyngeal mass. Patient is also intubated. Small mucosal retention cyst at the right fossa of Rosenmuller. Small ill-defined foci of enhancement in the superior mastoid and asymmetric left mastoid restricted diffusion. No coalescence or discrete mass. Skull and visualized cervical spine are within normal limits. EXTRACRANIAL SOFT TISSUES:  A nasopharyngeal mass will be described in further detail on today's neck MR report. Impression: No evidence of brain metastases. 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 Asymmetric patchy enhancement and restricted diffusion in the left mastoid. The differential includes neoplastic involvement and infection. Workstation performed: [id number]     XR chest portable    Result Date: 9/20/2023  Narrative: CHEST INDICATION:   NG tube placement. COMPARISON: 9/17/2023 EXAM PERFORMED/VIEWS:  XR CHEST PORTABLE FINDINGS: Tracheostomy tube is in stable position. Distal portions of nasogastric tube are seen below the hemidiaphragm within the left upper abdomen. The tip of the tube extends beyond the inferior margin of this study. Heart shadow is enlarged but unchanged from prior exam. There is mild pulmonary vascular congestion. The left costophrenic angle is obscured. Hazy opacities are additionally noted within the right costophrenic angle. No evidence of acute osseous abnormality. Impression: 1. Nasogastric tube is seen with its distal portions within the stomach. The tip of the tube courses beyond the inferior margin of the study. 2. Pulmonary vascular congestion with obscuration of the left hemidiaphragm. This is stable from prior radiograph and may represent small left effusion versus consolidation. 3. Right basilar atelectasis versus trace right effusion. Workstation performed: [id number]     XR chest portable    Result Date: 9/18/2023  Narrative: CHEST INDICATION:   Assess. COMPARISON:  None EXAM PERFORMED/VIEWS:  XR CHEST PORTABLE Images: 2 FINDINGS: Tracheostomy tube is seen in appropriate position. A feeding tube is seen extending down below the dome of the diaphragm Cardiomegaly seen Increased lung markings seen suggest congestion left base is obscured Osseous structures appear within normal limits for patient age. Impression: Increased lung markings seen suggest congestion. The left base is obscured No worsening Workstation performed: [id number]     CT soft tissue neck w contrast    Result Date: 9/14/2023  Narrative: CT NECK WITH CONTRAST INDICATION:   Laryngeal edema COMPARISON:  None.  TECHNIQUE:  Axial, sagittal, and coronal 2D reformatted images were created from the axial source data and submitted for interpretation. Radiation dose length product (DLP) for this visit:  769 mGy-cm . This examination, like all CT scans performed in the [facility], was performed utilizing techniques to minimize radiation dose exposure, including the use of iterative reconstruction and automated exposure control. IV Contrast:  85 mL of iohexol (OMNIPAQUE) IMAGE QUALITY:  Diagnostic. FINDINGS: VISUALIZED BRAIN PARENCHYMA:  No acute intracranial pathology of the visualized brain parenchyma. VISUALIZED ORBITS: Normal visualized orbits. PARANASAL SINUSES: Normal visualized paranasal sinuses. Nasopharynx, oropharynx AND HYPOPHARYNX: There is a large heterogeneously enhancing mass identified within the neck involving multiple spaces. The origin is difficult to ascertain given the large size. This mass measures approximately 6.1 x 4.7 x 5.2 cm and appears to be centered within the left oropharynx. The mass extends superiorly into the nasopharynx left more so than right and into the posterior aspect of the nasal cavity. The mass also extends across the midline within the oropharynx and inferiorly into the left lateral oropharynx but not below the laryngeal ventricle. The mass extends laterally into the infratemporal fossa and parapharyngeal fat and is inseparable from infratemporal musculature and deep lobe of the parotid gland. There is extension into the prevertebral space where the mass is inseparable from the anterior paraspinal muscle on the left and posteriorly and laterally into the carotid space where the mass is displacing and inseparable from jugular and carotid. The mass encases the cervical internal carotid artery just below the level of the skull base resulting in narrowing of the vessel and the mass compresses and occludes the jugular vein below the skull base. The vessel does reconstitute via collateral vessels as it  extends inferiorly within the neck.  The mass extends superiorly into the skull base inseparable from the carotid canal and jugular foramen. There is severe airway compromise within the posterior oral cavity and superior oropharynx. ET tube and OG tube are present. THYROID GLAND:  Unremarkable. PAROTID AND SUBMANDIBULAR GLANDS: Normal parotid and submandibular glands other than the mass abutting the deep lobe of the left parotid gland. LYMPH NODES: Multiple small jugular chain nodes are seen on the left. VASCULAR STRUCTURES: As described above the mass partially encases the cervical internal carotid artery, narrowing the vessel and occludes the jugular vein from the skull base to the mid neck. Below this the vessel is unremarkable due to collateral reconstitution. THORACIC INLET: Posterior left upper lobe consolidation may represent atelectasis or pneumonia. Mild patchy groundglass opacity within the upper lung fields. BONY STRUCTURES: At T1-2 there is a left paramedian bridging osteophyte resulting in mild to moderate left anterior lateral canal stenosis. Impression: Large enhancing soft tissue mass identified within the left neck involving multiple spaces. This appears to be centered within the left oropharynx with extensions as described above into multiple spaces. This results in significant airway compromise. 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 Small level 3 and level 4 nodes are seen within the neck. I personally discussed this study with ICU resident on 9/14/2023 4:44 PM. Workstation performed: [id number]     Bronchoscopy    Result Date: 9/14/2023  Narrative: Table formatting from the original result was not included. [address] [place] [address] [phone] DATE OF SERVICE: 9/14/23 PHYSICIAN(S): Attending: No Staff Documented Fellow: No Staff Documented INDICATION: Acute respiratory failure with hypoxia ([address]) POST-OP DIAGNOSIS: See the impression below.  PREPROCEDURE: Standard airway preparation completed per respiratory therapy protocol. Informed consent was obtained. Images reviewed prior to the procedure. A Time Out was performed. No suspicion or identified risk for TB or other airborne infectious disease; bronchoscopy procedure being performed for diagnostic purposes. PROCEDURE: Bronchoscopy DETAILS OF PROCEDURE: Patient was taken to the procedure room where a time out was performed to confirm correct patient and correct procedure. The patient was already under sedation prior to the procedure. The patient's blood pressure, ECG, heart rate, level of consciousness, respirations and oxygen were monitored throughout the procedure. The patient experienced no blood loss. The scope was introduced through the endotracheal tube. The procedure was moderately difficult due to patient intolerance and persistent coughing. In response to procedure difficulty, deeper sedation was employed. The patient tolerated the procedure well. There were no apparent adverse events. ANESTHESIA INFORMATION: ASA: ASA status not filed in the log. Anesthesia Type: Anesthesia type not filed in the log.  FINDINGS: The lower trachea, main sujata, left main stem, KARTHIK, lingula, LLL, right main stem, RUL, bronchus intermedius, RML and RLL appeared normal. Left lower lung zone with no endobronchial lesions, left upper and lingula both appear normal Right upper, right middle and right lower lobes all appeared normal with no endobronchial lesions Endotracheal tube was retracted 3 cm under direct visualization with the bronchoscope Bronchoalveolar lavage was performed x1 in the right lateral segment (RB4) with 60 mL of saline instilled and a total return of 40 mL; the fluid appeared cloudy SPECIMENS: ID Type Source Tests Collected by Time Destination 1 :  Lavage Lung, Right Middle Lobe Bronchoalveolar Lavage PULMONARY CYTOLOGY Freddie Astorga MD 9/14/2023 12:55 PM  A :  Bronchial Lung, Right Middle Lobe Bronchoalveolar Lavage FUNGAL CULTURE, VIRUS CULTURE, BRONCHIAL CULTURE AND GRAM STAIN, LEGIONELLA CULTURE, PNEUMOCYSTIS SMEAR BY DFA ([facility]), AFB CULTURE WITH STAIN Jo-Ann Granda MD 9/14/2023 12:57 PM       Impression: BAL of the right middle lobe Endotracheal tube was retracted under direct visualization Tongue still appears swollen, vocal cords visualized outside of the endotracheal tube with the bronchoscope, no significant edema or mass noted RECOMMENDATION:  Follow-up infectious work-up      XR chest 1 view portable    Result Date: 9/13/2023  Narrative: CHEST INDICATION:   post intubation. COMPARISON: Same day chest radiograph and subsequent same day CT chest. Chest x-ray 8/27/2023. EXAM PERFORMED/VIEWS:  XR CHEST PORTABLE FINDINGS: Endotracheal tube terminates approximately 4 cm above the sujata. Heart shadow is enlarged but unchanged from prior exam. Bibasilar airspace opacities are again demonstrated. No appreciable pneumothorax. No evidence of acute osseous abnormality. Lucent lesion within T12 is better demonstrated on same-day CT. Impression: 1. Endotracheal tube terminates 4 cm above the sujata. 2. Redemonstration of bibasilar airspace opacities. Workstation performed: [id number]     XR chest 1 view portable    Result Date: 9/13/2023  Narrative: CHEST INDICATION:   post intubation, adjusting ET Tube. COMPARISON:  None EXAM PERFORMED/VIEWS:  XR CHEST PORTABLE FINDINGS: Endotracheal tube terminates approximately 3 cm above the sujata. Cardiomediastinal silhouette appears unremarkable. Bibasilar airspace opacities are again noted. No appreciable pneumothorax or pleural effusion. No evidence of acute osseous abnormality. Lucent lesion within T12 is better demonstrated on same-day CT. Impression: 1. Endotracheal tube terminates 3.3 cm above the sujata. 2. Bibasilar airspace opacities are again demonstrated. Workstation performed: [id number]     XR chest portable    Result Date: 9/13/2023  Narrative: CHEST INDICATION:   sob. COMPARISON: 8/27/2023. Subsequent CT chest performed the same day. EXAM PERFORMED/VIEWS:  XR CHEST PORTABLE FINDINGS: Heart shadow is enlarged but unchanged from prior exam. Hazy opacities are noted within the right lung base, possibly atelectasis versus pneumonia. Left basilar opacity is similar to prior radiograph. No appreciable pneumothorax. No evidence of acute osseous abnormality. Cystic lesion within the T12 vertebral body is better characterized on same-day CT. Impression: 1. Hazy bibasilar airspace opacities which may represent atelectasis versus pneumonia. Left basilar opacity is similar to recent radiograph while right basilar opacity is new Workstation performed: [id number]     CTA ED chest PE Study    Result Date: 9/13/2023  Narrative: CTA - CHEST WITH IV CONTRAST - PULMONARY ANGIOGRAM INDICATION:   R/O PE. Reports shortness of breath since being discharged from the hospital 3 weeks ago for pneumonia. Fatigue. Productive cough with white sputum. Angioedema. COMPARISON: Chest radiograph 9/13/2023, chest CT 8/25/2023, thoracic spine CT 11/13/2013. TECHNIQUE: CT angiogram timed for optimal opacification of the pulmonary arteries. Axial, sagittal, and coronal 2D reformats created from source data. Coronal 3D MIP postprocessing on the acquisition scanner. Radiation dose length product (DLP):  472 mGy-cm . Radiation dose exposure minimized using iterative reconstruction and automated exposure control. IV Contrast:  85 mL of iohexol (OMNIPAQUE) FINDINGS: PULMONARY ARTERIES:  No pulmonary embolus. Moderate pulmonary artery enlargement. LUNGS: Mild patchy new consolidation in the medial segment right middle lobe. Improving opacity in the left upper lobe with mild residual atelectasis/scar. Redemonstration of mild dependent atelectasis in both lower lobes. Severe emphysema. AIRWAYS: No significant filling defects. ET tube 4 cm above the sujata. PLEURA:  Unremarkable. HEART/GREAT VESSELS: Moderate cardiomegaly.  MEDIASTINUM AND PRASANTH: Slight regression of hilar and mediastinal lymphadenopathy. The largest node was previously 2.5 x 2.0 cm in the right infrahilar region and is now 1.9 x 1.3 cm (501/94). CHEST WALL AND LOWER NECK: Unremarkable. UPPER ABDOMEN: Right renal cysts. OSSEOUS STRUCTURES: Redemonstration of the large cystic lesion with sclerotic margins in T12 with destruction of the superior and inferior endplates, present as a much smaller thin-walled cystic lesion on the thoracic spine CT from 2013, imaged on a thoracic spine MRI from 2020. Impression: No pulmonary embolus. Patchy consolidation right middle lobe, new from 8/25/2023, compatible with pneumonia. Slight regression of previous hilar and mediastinal lymphadenopathy. Improving left upper lobe opacity which may have been due to pneumonia with residual atelectasis/scar. Persistent partial atelectasis of the lower lobes. 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 Workstation performed: [id number]     Echo complete w/ contrast if indicated    Result Date: 8/28/2023  Narrative:   Left Ventricle: Left ventricular cavity size is small. Wall thickness is increased. There is severe concentric hypertrophy. The left ventricular ejection fraction is 60%. Systolic function is normal. Wall motion is normal. Diastolic function is mildly abnormal, consistent with grade I (abnormal) relaxation. There is no LV dynamic obstruction at rest.   Right Ventricle: Right ventricular cavity size is normal. Systolic function is normal.   Left Atrium: The atrium is mildly dilated. Mitral Valve: There is mild to moderate regurgitation. There is systolic anterior motion of the chordal apparatus with late peaking gradient 29 mmHg during Valsalva maneuver. Pericardium: There is a trivial pericardial effusion along the right atrial free wall.      XR chest portable ICU    Result Date: 8/28/2023  Narrative: CHEST INDICATION:   Acute hypoxic respiratory failure. COMPARISON: 8/25/2023 EXAM PERFORMED/VIEWS:  XR CHEST PORTABLE ICU FINDINGS: Heart shadow is enlarged but unchanged from prior exam. There is stable left-sided volume loss secondary to left basilar atelectasis. No pneumothorax or pleural effusion. Osseous structures appear within normal limits for patient age. Impression: Stable volume loss on the left secondary to left lower lobe atelectasis. Workstation performed: [id number]     VAS lower limb venous duplex study, complete bilateral    Result Date: 8/27/2023  Narrative:  [facility] REPORT CLINICAL: Indications: Patient presents with bilateral lower extremity pain x 1 days. Operative History: cardiac surgery-date unknown. Risk Factors The patient has history of HTN and CKD. She has no history of Hyperlipidemia. CONCLUSION:  Impression: RIGHT LOWER LIMB: No evidence of acute or chronic deep vein thrombosis. No evidence of superficial thrombophlebitis noted. Doppler evaluation shows a normal response to augmentation maneuvers. . Popliteal, posterior tibial and anterior tibial arterial Doppler waveform's are triphasic. LEFT LOWER LIMB: No evidence of acute or chronic deep vein thrombosis. No evidence of superficial thrombophlebitis noted. Doppler evaluation shows a normal response to augmentation maneuvers. Popliteal, posterior tibial and anterior tibial arterial Doppler waveform's are triphasic. SIGNATURE: Electronically Signed by: Sina Shirley on 2023-08-27 08:12:52 PM    CTA ED chest PE Study    Result Date: 8/25/2023  Narrative: CTA - CHEST WITH IV CONTRAST - PULMONARY ANGIOGRAM INDICATION:   Increased SOB, recent COVID diagnosis.  COMPARISON: MRI from 3/18/2020 as well as bone scan from 7/2/2019 and thoracic spine from 11/13/2013 TECHNIQUE: CTA examination of the chest was performed using angiographic technique according to a protocol specifically tailored to evaluate for pulmonary embolism. Multiplanar 2D reformatted images were created from the source data. In addition, coronal 3D MIP postprocessing was performed on the acquisition scanner. The study is limited by some respiratory motion artifacts especially in the lower lobes on the left where there is crowding of vessels due to atelectatic changes. Radiation dose length product (DLP) for this visit:  370 mGy-cm . This examination, like all CT scans performed in the [facility], was performed utilizing techniques to minimize radiation dose exposure, including the use of iterative reconstruction and automated exposure control. IV Contrast:  80 mL of iohexol (OMNIPAQUE) FINDINGS: PULMONARY ARTERIAL TREE: Limited visualization left lower lobe however there is suspicion for a small embolus in the posterobasal segment on axial image 139, series 305 and coronal image 142. No definite filling defect is seen elsewhere. The main pulmonary artery is significantly dilated at 4.2 cm. The RV LV ratio is elevated at 1.1 cm. The primary pulmonary vascular stent minutes are also dilated chronicity is uncertain. LUNGS: Emphysema is noted with blebs at the apices. There is peripheral consolidation in the left upper lobe. Air bronchogram is not well seen and there is mucous occlusion of the left mainstem bronchus with volume loss of the left upper lobe as well as  left lower lobe to a lesser extent. Some aeration in the left lower lobe is still visible. PLEURA:  Unremarkable. HEART/GREAT VESSELS:  Unremarkable for patient's age. No thoracic aortic aneurysm. MEDIASTINUM AND PRASANTH: 10 mm pretracheal node is identified. 9 mm superior mediastinal node is identified. 10 mm prevascular node is identified. Subcarinal node measures 1.5 cm. Hilar adenopathy is also demonstrated. Right hilar node is larger measuring 1.8 cm in short axis on image 138. CHEST WALL AND LOWER NECK:   Unremarkable.  VISUALIZED STRUCTURES IN THE UPPER ABDOMEN: Low-density cyst is seen in the right kidney. . OSSEOUS STRUCTURES: There appears to be a destructive lesion involving the T12 vertebral body eroding both endplates and much of the vertebral body this does not appear to represent a Schmorl's node. A cystic lesion was noted in 2013 at this location however the current lesion is larger with loss of endplates. MRI also imaged this lesion in 2020 the lesion appears somewhat larger on the current examination however. Impression: Limited study. There is equivocal embolus in the posterior basal segment left lower lobe. Other smaller segments are difficult to assess due to motion and vascular crowding. There is mucous plugging in the left mainstem bronchus with volume loss in portions of the left lower lobe and left upper lobe. Aspiration pneumonia is not excluded. There is dilatation of the main pulmonary artery and proximal pulmonary segment suggesting pulmonary hypertension this is more likely chronic than acute given the degree of distention. Emphysema is present. There is significant mediastinal and hilar adenopathy suggesting underlying neoplasm more likely than simple reactive nodes. Enlarging lesion at T12 is again demonstrated of uncertain etiology. This has been present since 2013. Perhaps a plasmacytoma is a consideration. Neoplastic work-up is advised. Pulmonology consultation is also advised to assess endobronchial obstruction on the left. This was discussed with resident physician Dr. Vera Khan at 4:58 p.m. Follow-up was marked in the epic system Workstation performed: [id number]     XR chest 1 view portable    Result Date: 8/25/2023  Narrative: CHEST INDICATION:   SOB. COMPARISON: 8/21/2021 EXAM PERFORMED/VIEWS:  XR CHEST PORTABLE Single view FINDINGS: Development of CHF. Probable left basal infiltrate. Cardiomediastinal silhouette has become more enlarged. No pneumothorax or pleural effusion. Osseous structures appear within normal limits for patient age. Impression: Increased cardiomegaly. Development of CHF.  Probable left basal infiltrate Workstation performed: [id number]

## 2023-10-17 LAB
ALBUMIN SERPL BCP-MCNC: 3.1 G/DL (ref 3.5–5)
ALP SERPL-CCNC: 113 U/L (ref 34–104)
ALT SERPL W P-5'-P-CCNC: 37 U/L (ref 7–52)
ANION GAP SERPL CALCULATED.3IONS-SCNC: 7 MMOL/L
ANISOCYTOSIS BLD QL SMEAR: PRESENT
AST SERPL W P-5'-P-CCNC: 24 U/L (ref 13–39)
BASOPHILS # BLD MANUAL: 0 THOUSAND/UL (ref 0–0.1)
BASOPHILS NFR MAR MANUAL: 0 % (ref 0–1)
BILIRUB SERPL-MCNC: 0.34 MG/DL (ref 0.2–1)
BUN SERPL-MCNC: 39 MG/DL (ref 5–25)
CALCIUM ALBUM COR SERPL-MCNC: 10.3 MG/DL (ref 8.3–10.1)
CALCIUM SERPL-MCNC: 9.6 MG/DL (ref 8.4–10.2)
CHLORIDE SERPL-SCNC: 99 MMOL/L (ref 96–108)
CO2 SERPL-SCNC: 31 MMOL/L (ref 21–32)
CREAT SERPL-MCNC: 0.82 MG/DL (ref 0.6–1.3)
EOSINOPHIL # BLD MANUAL: 0 THOUSAND/UL (ref 0–0.4)
EOSINOPHIL NFR BLD MANUAL: 0 % (ref 0–6)
ERYTHROCYTE [DISTWIDTH] IN BLOOD BY AUTOMATED COUNT: 14.9 % (ref 11.6–15.1)
GALACTOMANNAN AG SPEC IA-ACNC: 0.09 INDEX (ref 0–0.49)
GFR SERPL CREATININE-BSD FRML MDRD: 72 ML/MIN/1.73SQ M
GLUCOSE SERPL-MCNC: 153 MG/DL (ref 65–140)
HCT VFR BLD AUTO: 29.2 % (ref 34.8–46.1)
HGB BLD-MCNC: 9.4 G/DL (ref 11.5–15.4)
LDH SERPL-CCNC: 238 U/L (ref 140–271)
LYMPHOCYTES # BLD AUTO: 0.13 THOUSAND/UL (ref 0.6–4.47)
LYMPHOCYTES # BLD AUTO: 2 % (ref 14–44)
MAGNESIUM SERPL-MCNC: 2.1 MG/DL (ref 1.9–2.7)
MCH RBC QN AUTO: 30.3 PG (ref 26.8–34.3)
MCHC RBC AUTO-ENTMCNC: 32.2 G/DL (ref 31.4–37.4)
MCV RBC AUTO: 94 FL (ref 82–98)
MONOCYTES # BLD AUTO: 0.25 THOUSAND/UL (ref 0–1.22)
MONOCYTES NFR BLD: 4 % (ref 4–12)
MYCOBACTERIUM SPEC CULT: NORMAL
MYCOBACTERIUM SPEC CULT: NORMAL
NEUTROPHILS # BLD MANUAL: 5.91 THOUSAND/UL (ref 1.85–7.62)
NEUTS SEG NFR BLD AUTO: 94 % (ref 43–75)
PLATELET # BLD AUTO: 730 THOUSANDS/UL (ref 149–390)
PLATELET BLD QL SMEAR: ABNORMAL
PMV BLD AUTO: 9.8 FL (ref 8.9–12.7)
POTASSIUM SERPL-SCNC: 4 MMOL/L (ref 3.5–5.3)
PROT SERPL-MCNC: 5.9 G/DL (ref 6.4–8.4)
RBC # BLD AUTO: 3.1 MILLION/UL (ref 3.81–5.12)
RBC MORPH BLD: PRESENT
RHODAMINE-AURAMINE STN SPEC: NORMAL
RHODAMINE-AURAMINE STN SPEC: NORMAL
SODIUM SERPL-SCNC: 137 MMOL/L (ref 135–147)
URATE SERPL-MCNC: 4.5 MG/DL (ref 2–7.5)
WBC # BLD AUTO: 6.29 THOUSAND/UL (ref 4.31–10.16)

## 2023-10-17 PROCEDURE — 83735 ASSAY OF MAGNESIUM: CPT

## 2023-10-17 PROCEDURE — 80053 COMPREHEN METABOLIC PANEL: CPT | Performed by: INTERNAL MEDICINE

## 2023-10-17 PROCEDURE — 83615 LACTATE (LD) (LDH) ENZYME: CPT | Performed by: INTERNAL MEDICINE

## 2023-10-17 PROCEDURE — 85027 COMPLETE CBC AUTOMATED: CPT | Performed by: INTERNAL MEDICINE

## 2023-10-17 PROCEDURE — 85007 BL SMEAR W/DIFF WBC COUNT: CPT | Performed by: INTERNAL MEDICINE

## 2023-10-17 PROCEDURE — 94003 VENT MGMT INPAT SUBQ DAY: CPT

## 2023-10-17 PROCEDURE — 84550 ASSAY OF BLOOD/URIC ACID: CPT | Performed by: INTERNAL MEDICINE

## 2023-10-17 PROCEDURE — 94640 AIRWAY INHALATION TREATMENT: CPT

## 2023-10-17 PROCEDURE — 99291 CRITICAL CARE FIRST HOUR: CPT | Performed by: STUDENT IN AN ORGANIZED HEALTH CARE EDUCATION/TRAINING PROGRAM

## 2023-10-17 PROCEDURE — 94150 VITAL CAPACITY TEST: CPT

## 2023-10-17 PROCEDURE — 94760 N-INVAS EAR/PLS OXIMETRY 1: CPT

## 2023-10-17 PROCEDURE — 99233 SBSQ HOSP IP/OBS HIGH 50: CPT | Performed by: INTERNAL MEDICINE

## 2023-10-17 PROCEDURE — 3E04305 INTRODUCTION OF OTHER ANTINEOPLASTIC INTO CENTRAL VEIN, PERCUTANEOUS APPROACH: ICD-10-PCS | Performed by: INTERNAL MEDICINE

## 2023-10-17 RX ORDER — LANOLIN ALCOHOL/MO/W.PET/CERES
3 CREAM (GRAM) TOPICAL
Status: DISCONTINUED | OUTPATIENT
Start: 2023-10-17 | End: 2023-12-04 | Stop reason: HOSPADM

## 2023-10-17 RX ORDER — SODIUM CHLORIDE 9 MG/ML
20 INJECTION, SOLUTION INTRAVENOUS ONCE
Status: COMPLETED | OUTPATIENT
Start: 2023-10-19 | End: 2023-10-19

## 2023-10-17 RX ORDER — ACETAMINOPHEN 325 MG/1
650 TABLET ORAL ONCE
Status: COMPLETED | OUTPATIENT
Start: 2023-10-20 | End: 2023-10-20

## 2023-10-17 RX ADMIN — IPRATROPIUM BROMIDE 0.5 MG: 0.5 SOLUTION RESPIRATORY (INHALATION) at 13:23

## 2023-10-17 RX ADMIN — NICOTINE 7 MG: 7 PATCH, EXTENDED RELEASE TRANSDERMAL at 08:49

## 2023-10-17 RX ADMIN — GABAPENTIN 300 MG: 300 CAPSULE ORAL at 21:25

## 2023-10-17 RX ADMIN — LEVALBUTEROL HYDROCHLORIDE 1.25 MG: 1.25 SOLUTION RESPIRATORY (INHALATION) at 13:23

## 2023-10-17 RX ADMIN — OXYCODONE HYDROCHLORIDE 5 MG: 5 SOLUTION ORAL at 21:25

## 2023-10-17 RX ADMIN — METOPROLOL TARTRATE 25 MG: 25 TABLET, FILM COATED ORAL at 10:08

## 2023-10-17 RX ADMIN — ENOXAPARIN SODIUM 40 MG: 40 INJECTION SUBCUTANEOUS at 08:48

## 2023-10-17 RX ADMIN — CHLORHEXIDINE GLUCONATE 15 ML: 1.2 SOLUTION ORAL at 08:50

## 2023-10-17 RX ADMIN — IPRATROPIUM BROMIDE 0.5 MG: 0.5 SOLUTION RESPIRATORY (INHALATION) at 19:59

## 2023-10-17 RX ADMIN — METOPROLOL TARTRATE 25 MG: 25 TABLET, FILM COATED ORAL at 21:25

## 2023-10-17 RX ADMIN — FAMOTIDINE 20 MG: 20 TABLET, FILM COATED ORAL at 17:00

## 2023-10-17 RX ADMIN — IPRATROPIUM BROMIDE 0.5 MG: 0.5 SOLUTION RESPIRATORY (INHALATION) at 07:19

## 2023-10-17 RX ADMIN — OXYCODONE HYDROCHLORIDE 5 MG: 5 SOLUTION ORAL at 12:40

## 2023-10-17 RX ADMIN — PREDNISONE 100 MG: 50 TABLET ORAL at 10:08

## 2023-10-17 RX ADMIN — QUETIAPINE FUMARATE 50 MG: 25 TABLET ORAL at 21:25

## 2023-10-17 RX ADMIN — SERTRALINE 75 MG: 25 TABLET, FILM COATED ORAL at 08:48

## 2023-10-17 RX ADMIN — GABAPENTIN 300 MG: 300 CAPSULE ORAL at 17:00

## 2023-10-17 RX ADMIN — FLUCONAZOLE 200 MG: 100 TABLET ORAL at 08:48

## 2023-10-17 RX ADMIN — OXYCODONE HYDROCHLORIDE 5 MG: 5 SOLUTION ORAL at 05:03

## 2023-10-17 RX ADMIN — BUSPIRONE HYDROCHLORIDE 30 MG: 10 TABLET ORAL at 08:49

## 2023-10-17 RX ADMIN — BUSPIRONE HYDROCHLORIDE 30 MG: 10 TABLET ORAL at 21:25

## 2023-10-17 RX ADMIN — PREDNISONE 100 MG: 50 TABLET ORAL at 17:00

## 2023-10-17 RX ADMIN — ACYCLOVIR 400 MG: 200 CAPSULE ORAL at 10:08

## 2023-10-17 RX ADMIN — LEVALBUTEROL HYDROCHLORIDE 1.25 MG: 1.25 SOLUTION RESPIRATORY (INHALATION) at 19:59

## 2023-10-17 RX ADMIN — BUSPIRONE HYDROCHLORIDE 30 MG: 10 TABLET ORAL at 16:59

## 2023-10-17 RX ADMIN — AMLODIPINE BESYLATE 10 MG: 5 TABLET ORAL at 08:49

## 2023-10-17 RX ADMIN — GABAPENTIN 300 MG: 300 CAPSULE ORAL at 08:49

## 2023-10-17 RX ADMIN — LEVALBUTEROL HYDROCHLORIDE 1.25 MG: 1.25 SOLUTION RESPIRATORY (INHALATION) at 07:19

## 2023-10-17 RX ADMIN — CHLORHEXIDINE GLUCONATE 15 ML: 1.2 SOLUTION ORAL at 21:25

## 2023-10-17 RX ADMIN — FAMOTIDINE 20 MG: 20 TABLET, FILM COATED ORAL at 08:49

## 2023-10-17 RX ADMIN — LEVOFLOXACIN 250 MG: 250 TABLET, FILM COATED ORAL at 10:08

## 2023-10-17 RX ADMIN — ACYCLOVIR 400 MG: 200 CAPSULE ORAL at 17:01

## 2023-10-17 RX ADMIN — MELATONIN 3 MG: at 21:25

## 2023-10-17 NOTE — PROGRESS NOTES
[address]  Progress Note: Critical Care  Name: Sundeep Huitron 77 y.o. female I MRN: [id number]  Unit/Bed#: ICU 03 I Date of Admission: 9/13/2023   Date of Service: 10/17/2023 I Hospital Day: 29    Assessment/Plan   * Acute respiratory failure with hypoxia secondary to oropharyngeal mass  Assessment & Plan  Cuffless trach placed 9/17, transitioned to cuffed on 10/3. Oxygen requirements not improving. For mental health patient is on buspirone, quetiapine, and sertraline. For pain, gabapentin, oxycodone scheduled and prn, prn oxycodone mainly utilized for increased pain during chemo and after a bronch. On Guaifenesin, Atrovent and Xopenex for airway maintenance. No improvement in bilateral consolidation or atelectasis and groundglass opacities on repeat CT and chest x-rays. Patient not receiving proper positive pressure to open up atelectatic lung. Bronchial cx with 3 colonies CoNS. PCP PCR invalid, repeat negative. CTCAP indicates additional groundglass opacity with paraseptal emphysema, which may be contributing to inability to remain off vent. SBTs have been unsuccessful to place patient on trach collar since return to ICU. Patient tolerated high flow all day and overnight    Plan:  Maintain cuff overnight from 9 pm to 6 am for positive pressure, can open cuff during the day for patient to speak  10/8 today, can attempt CPAP when 8/8  Repeat Bcx if febrile  Consider repeat bronch for DAH if Hgb dropping again    Large cell lymphoma ([address])  Assessment & Plan  Large B-cell lymphoma, stage II. First chemo cycle 9/22 4 days of R-EPOCH. 9/27 Rituxan. 9/28 Filgrastim. Acyclovir, Zyloprim, Diflucan, Levaquin, Zofran, Bactrim for chemo prophylaxis, all discontinued with heme-onc's approval as Nafisa reached 4200. I suspect leukocytosis is secondary to the filgrastim. Restarted all ppx 10/13 for [facility] cycle 2 which will run for the next 4 days.  Ppx and filgrastim can be discontinued when patient is no longer neutropenic after this cycle again. Platelets have been up trending to 696, which did not happen during cycle 1. This could simply be reactive from inflammation, but could also occur from vincristine which is part of current regimen in combination with the residual effect from filgastrim. Likely just reactive but if continues to rise consider checking blood smear, ferritin, ESR, CRP, Jak2, CALR, MPL, BCR-ABL1. Glucose is increasing on past 2 morning draws, appears to be 2/2 prednisolone from regimen. PICC came out with 3 hours chemo remaining    Plan:  Keep Hgb>7 and Plt>20 for chemo  Urgent vascular access to complete chemo  See acute respiratory failure above    Oropharyngeal mass  Assessment & Plan  9/14 Neck/ soft tissue CT: Large enhancing soft tissue mass identified within the left neck involving multiple spaces. Centered within the left oropharynx with extensions as described above into multiple spaces. This results in significant airway compromise. 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 Small level 3/level 4 nodes are seen within the neck. Tracheostomy by ENT, bx & addition testing performed. Replaced on 9/26. H/o tobacco abuse, daily smoker. On nicotine while inpatient, confirmed with patient she needs nicotine patch. CT soft tissue neck shows reduced mass effect from 9/14 to 10/13    Plan:  ENT and heme onc following  Will titrate NRT weekly  See acute respiratory failure and large B-cell lymphoma above      (HFpEF) heart failure with preserved ejection fraction [facility])  Assessment & Plan  Wt Readings from Last 3 Encounters:   10/17/23 80.4 kg (177 lb 4 oz)   09/07/23 74.6 kg (164 lb 6.4 oz)   08/30/23 73.3 kg (161 lb 9.6 oz)     Lab Results   Component Value Date    LVEF 60 08/28/2023     (H) 09/29/2023     (H) 09/13/2023     Echo 8/28/23: LVEF 60%.        Plan:  Mg > 2 and K > 4   Measure I/O      Angioedema  Assessment & Plan  POA in [place] ([facility] ED: Swollen tongue, soft and hard palate with severe angioedema. Decadron 10 mg, Benadryl 25 mg given. Initially refused but eventually agreed to intubation. 2/2 oropharyngeal mass compression. Plan:  Cuffless trach 9/17, transitioned to cuffed 10/3  See acute respiratory failure and oropharyngeal mass above    Ambulatory dysfunction  Assessment & Plan  Impacted by chronic pain syndrome, but medication regimen may also contribute. Patient currently requires tracheostomy with ICU level care and will be inpatient for R-EPOCH cycle 2 10/13 to 10/17. Continue OOB with PT. Need LTACH placement via CM. Pain syndrome, chronic  Assessment & Plan  Home regimen: Oxycodone 5 mg BID, Tylenol 650 mg q8 prn. Gabapentin 600 mg TID. Medical marijuana. Lumbar radiculopathy and impaired ambulatory dysfunction secondary to pain. Has bowel regimen and is having bowel movements daily. Patient required additional pain management during previous cycle    Plan:  Continue gabapentin 300 mg TID, oxycodone 5 mg TID, prn Tylenol 650 mg q6. Oxycodone 2.5 mg prn can d/c tomorrow    Benign essential hypertension  Assessment & Plan  Home regimen Coreg 12.5 mg BID, Amlodipine 10 mg daily, and lisinopril 40 mg. All discontinued at this time secondary to hypotension and PO since cycle 1. but stable currently without any antihypertensives. Systolic persistently elevated and tachycardic, potentially secondary to pain. Patient was more hypotensive during last chemo. Coreg contraindicated during chemo. Patient with BP > 170s in past 12 hours    Plan:  Monitor vitals. Continue Norvasc 10  Prn hydralazine for SBP > 160  Start Lopressor 25 BID    Depression  Assessment & Plan  Patient on Zoloft 75 daily and Seroquel evening. Patient is depressed, family meeting with palliative on 10/5 for goals of care. Patient is firm that she wants to live and to fight, she is just tired.  Palliative signed off     Generalized abdominal pain  Assessment & Plan  Patient reports abdominal pain which is unchanged since 9/22 no guarding on exam. Takes Famotidine for GERD at home. Alk phos 10/2 145, 10/17 113. CTCAP reveals complex right upper pole renal lesion requiring possible outpatient MRI    - Continue Famotidine  - On bowel regimen with Senna and Miralax prn    Chronic Superficial thrombophlebitis  Assessment & Plan  Right forearm soreness. RUE US: No acute or chronic deep vein thrombosis. 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 No longer requiring renal dosing as PO resolved. -Continue DVT ppx with Lovenox 40    Blister (nonthermal) of left forearm, sequela  Assessment & Plan  Blisters of left upper extremity healing, no signs of infection, continue daily wound care. CKD (chronic kidney disease) stage 3, GFR 30-59 ml/min [facility])  Assessment & Plan  Lab Results   Component Value Date    EGFR 72 10/17/2023    EGFR 74 10/16/2023    EGFR 78 10/15/2023    CREATININE 0.82 10/17/2023    CREATININE 0.80 10/16/2023    CREATININE 0.77 10/15/2023     Baseline Cr appears to be between 0.75-1.1. Patient received 2 doses of Bumex IV 2 g as she had not been responding appropriately to IV 40 Lasix daily. Creatinine went up by 0.5 meeting criteria for an PO. This may be prerenal intrinsic or postrenal.  Potential prerenal causes include reduced kidney perfusion due to lisinopril. Intrinsic can also be lisinopril due to ATN, AIN from chemo medications, TLS, crystaline nephropathy from acyclovir, rituxan nephrotoxicity, filgrastim glomerulonephritis. Post renal obstructive could be from urine retention from vincristine or cyclophosphamide bladder fibrosis. Suspect most likely due to medications as a combination of prerenal and intrinsic. FENa was 0.2%, UA shows no casts or signs of infection, urine eosinophils 0%. Patient likely has prerenal PO from volume depletion.  Received 2 L NS and 1 pRBC and cr went up by 0.07 still and Na and Cl went down, suspect that volume prevented further cr rise and free water excretion impaired by kidney function, allowing hyponatremia to increase. Creatinine increase is likely plateaued and went down the following day. Suspect that now that nephrotoxic medications have been stopped she will continue to respond well to Lasix and creatinine will continue to downtrend. PO now resolved. Will be cautious about nephrotoxins and monitor as restarting EPOCH and ppx. Patient gets volume depleted and overloaded very easily. Will likely be depleted from chemo. Cr at baseline. ICU Core Measures     A: Assess, Prevent, and Manage Pain Has pain been assessed? Yes  Need for changes to pain regimen? No   B: Both SAT/SAT  N/A   C: Choice of Sedation RASS Goal: 0 Alert and Calm or N/A patient not on sedation  Need for changes to sedation or analgesia regimen? No   D: Delirium CAM-ICU: Negative   E: Early Mobility  Plan for early mobility? No   F: Family Engagement Plan for family engagement today? No       Antibiotic Review: Continue broad spectrum secondary to severity of illness. Review of Invasive Devices:            Prophylaxis:  VTE VTE covered by:  enoxaparin, Subcutaneous, 40 mg at 10/16/23 0827       Stress Ulcer  covered byfamotidine (PEPCID) oral suspension 40 mg [[id number]], pantoprazole (PROTONIX) injection 40 mg [[id number]]          Subjective   HPI/24hr events:   Patient required prn hydralazine overnight. This morning at 5:30 am patient was found to have removed PICC mistaking it for BP cuff. 3 hours of chemo remain. Patient feels a bit tender at removal site and states she is "tired of being a prisoner."    Review of Systems   Constitutional:  Negative for chills and fever. Respiratory:  Negative for shortness of breath and wheezing. Cardiovascular:  Negative for chest pain, palpitations and leg swelling. Gastrointestinal:  Positive for nausea.           Objective                            Vitals I/O      Most Recent Min/Max in 24hrs   Temp 99 °F (37.2 °C) Temp  Min: 97.7 °F (36.5 °C)  Max: 99 °F (37.2 °C)   Pulse 85 Pulse  Min: 77  Max: 104   Resp 20 Resp  Min: 0  Max: 35   /83 BP  Min: 135/72  Max: 193/95   O2 Sat 99 % SpO2  Min: 89 %  Max: 100 %      Intake/Output Summary (Last 24 hours) at 10/17/2023 0802  Last data filed at 10/17/2023 0600  Gross per 24 hour   Intake 1564 ml   Output 1298 ml   Net 266 ml         Diet Enteral/Parenteral; Tube Feeding No Oral Diet; Two Telly HN; Continuous; 38; 190; Water; Every 4 hours     Invasive Monitoring Physical exam    Physical Exam  HENT:      Head: Normocephalic and atraumatic. Eyes:      Extraocular Movements: Extraocular movements intact. Cardiovascular:      Rate and Rhythm: Normal rate and regular rhythm. Heart sounds: Normal heart sounds. No murmur heard. Pulmonary:      Effort: Pulmonary effort is normal.      Breath sounds: Rales present. No wheezing. Abdominal:      Tenderness: There is no guarding. Musculoskeletal:         General: Signs of injury present. Right lower leg: No edema. Left lower leg: No edema. Comments: RUE dressing over PICC removal site   Skin:     Coloration: Skin is not jaundiced or pale. Neurological:      Mental Status: She is alert and oriented to person, place, and time.            Diagnostic Studies      No new imaging to review     Medications:  Scheduled PRN   acyclovir, 400 mg, BID  amLODIPine, 10 mg, Daily  busPIRone, 30 mg, TID  chlorhexidine, 15 mL, Q12H ASHLEY  cyclophosphamide (CYTOXAN) 1,350 mg in sodium chloride 0.9 % 250 mL IVPB, 750 mg/m2 (Treatment Plan Recorded), Once  DOXOrubicin (ADRIAMYCIN) 18 mg, etoposide (TOPOSAR) 67.6 mg, vinCRIStine (ONCOVIN) 0.7 mg in sodium chloride 0.9 % 500 mL infusion, , Q24H  enoxaparin, 40 mg, Q24H ASHLEY  famotidine, 20 mg, BID  fluconazole, 200 mg, Daily  gabapentin, 300 mg, TID  levalbuterol, 1.25 mg, TID   And  ipratropium, 0.5 mg, TID  levofloxacin, 250 mg, Q24H [address]  nicotine, 7 mg, Daily  ondansetron (ZOFRAN) 16 mg, dexamethasone (DECADRON) 10 mg in sodium chloride 0.9 % 50 mL IVPB, , Q24H  oxyCODONE, 5 mg, Q8H  predniSONE, 60 mg/m2 (Treatment Plan Recorded), BID With Meals  QUEtiapine, 50 mg, HS  senna, 8.8 mg, HS  sertraline, 75 mg, Daily  sodium chloride, 2,000 mL, Once  sulfamethoxazole-trimethoprim, 1 tablet, Once per day on Mon Wed Fri      acetaminophen, 650 mg, Q6H PRN  alteplase, 2 mg, Q1MIN PRN  guaiFENesin, 200 mg, Q6H PRN  hydrALAZINE, 5 mg, Q6H PRN  ondansetron, 4 mg, Q8H PRN  ondansetron, 4 mg, Q8H PRN  oxyCODONE, 2.5 mg, Q6H PRN  polyethylene glycol, 17 g, Daily PRN  sodium chloride, 20 mL/hr, Daily PRN  sodium chloride, 4 mL, Q6H PRN       Continuous          Labs:    CBC    Recent Labs     10/16/23  0448 10/17/23  0539   WBC 10.02 6.29   HGB 9.3* 9.4*   HCT 29.7* 29.2*   * 730*     BMP    Recent Labs     10/16/23  0448 10/17/23  0539   SODIUM 138 137   K 4.2 4.0   CL 99 99   CO2 31 31   AGAP 8 7   BUN 34* 39*   CREATININE 0.80 0.82   CALCIUM 9.7 9.6       Coags    No recent results     Additional Electrolytes  Recent Labs     10/16/23  0448 10/17/23  0539   MG 1.9 2.1          Blood Gas    No recent results  No recent results LFTs  Recent Labs     10/16/23  0448 10/17/23  0539   ALT 39 37   AST 27 24   ALKPHOS 130* 113*   ALB 3.1* 3.1*   TBILI 0.34 0.34       Infectious  No recent results  Glucose  Recent Labs     10/16/23  0448 10/17/23  0539   GLUC 164* 153*                   Ramesh Ornelas MD

## 2023-10-17 NOTE — CASE MANAGEMENT
Case Management Progress Note    Patient name Raissa Hassan  Location ICU 03/ICU 03 MRN [id number]  : 1955 Date 10/17/2023       LOS (days): 34  Geometric Mean LOS (GMLOS) (days): 26.10  Days to GMLOS:-7.9        OBJECTIVE:        Current admission status: Inpatient  Preferred Pharmacy:   [id number] [place] - [address]. [address] [facility] [address]  Phone: [phone] Fax: [phone] [facility] [facility] [address] [facility] [address] [facility]  [address] [facility] [facility] [address]  Phone: [phone] Fax: [phone]    Primary Care Provider: Javi Wellington MD    Primary Insurance: [employer]  Secondary Insurance: [address] [facility]    PROGRESS NOTE:    SHIRA met with pt daughter, Janie Albert, briefly. Linnette aware CM continues to follow for dcp. Linnette aware with pt receiving Chemo that LTACH is not a current option. SNF STR continue to follow pt for rehab once pt is weaned from the vent. She is aware that pt can continue treatment while getting rehab at a SNF. Janie Albert reports no questions at this time. SHIRA spoke with resident, Dr. Michelle Lancaster. Aware that LTACH is not an option as long as pt continues with Chemo. She is aware STR SNFs continue to follow and treatment can be done from a SNF level of care. PT would need to be on trach collar for at least 48 hours with no need for the vent. Aware that pt would need to be around 28% FiO2. Patient

## 2023-10-17 NOTE — NURSING NOTE
Right upper arm PICC removed per RN. PIV requested and patient refused to be stuck. Will follow in the am 10/18.

## 2023-10-17 NOTE — NURSING NOTE
Patient admitted to pulling out her PICC line accidentally. She stated that she felt the blood pressure cuff get tight on her arm and tried to pull it off, but accidentally pulled at the PICC line instead. Patient states the area is mildly tender to touch. Vascular consult placed for new PICC line today.

## 2023-10-17 NOTE — PROGRESS NOTES
Medical Oncology/Hematology Progress Note  Claire Ray, female, 77 y. 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 CT AP with no lymphadenopathy; Brain MRI no lesion; stage II Large B Cell Lymphoma. She is also s/p IR gastrostomy tube placement on 9/27/23. She started her dose-adjusted systemic treatment, R-EPOCH, on 9/22/23 with the last dose on 9/25/23. She received Rituxan infusion on 9/27/23. Her CMP and uric acid have been unremarkable for tumor lysis. CT Soft Tissue Neck (10/16/23) showed improving multi spatial mass centered in the left nasopharynx/oropharynx. Again the mass extends up to the skull base in the infratemporal fossa with loss of perineural fat at the left foramen ovale. Decreased mass effect on the airway. Of note, she is on Cycle 2 of EPOCH-R that started on 10/13/23. She was scheduled for Day 6, Cycle 2 of rituximab today, 10/17/23, but will be held for now due to PICC line placement issues. She has approximately 3 hours left for her chemotherapy treatment prior to the PICC line dislodgement. Patient continues to be on cuffed trach, respiratory status improving per ICU team.       Assessment and Plan  1. 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 Today (10/17/23) slated for rituximab, immunotherapy. Will hold treatment for today. -S/p Cycle 1 EPOCH (etoposidevincristine, cyclophosphamide, doxorubicin) on 9/25/23, and rituxamab on 9/27/23. Started with growth factor on 9/28/23. Filgrastim (Neupogen) ordered at 5 mg/kg (375 mg) rounded to 300 mcg. Discontinued as of 10/6/23.    2. Left Jugular Lymphadenopathy     3. Leukocytosis   -Improved.  Now back to normal values  -Likely a derivative from growth factor  -WBC 6.29 < 10.02 (10/16) < 15.56 < 17.32 (10/12) < 15.42 (10/10)   - ANC 17.01 (10/14) < 11.26 (10/12) <10.18 <  9.44 < 8.09 < 0.94 < 0.12 < 0.02 < 0.00  -Continues to be afebrile  -Started with growth factor, Neupogen, on 9/28/23, d/c on 10/6/23      4. Acute Thrombocytosis  Platelet trend: 280  (10/17) <696 (10/16) < 576 < 460 (10/14) < 331 (10/13) normal   -Platelets normal on 3371/37. Most likely reactive  -On DVT ppx, Lovenox 40 mg daily    PLAN:  -Hold further chemo treatment (3 hours left for cyclophosphamide) and immunotherapy (rituximab) for today.   -Will revisit treatment plan for tomorrow. -CMP and CBC with differential, uric acid daily.   -Transfuse for hemoglobin <7 g/dL. -recommend assistance from pastoral care for continued emotional and spiritual support. Interval Hx:  As soon as she was approached this morning, she started becoming tearful. She expressed feeling frustrated in general. When we discussed that she will need to go to IR to get her PICC line placed, she asked if we can possibly give her a break today with treatment. She was very thankful when her request was granted. Discussed that we will revisit again tomorrow to pursue further treatment, and will with just a peripheral line. She was amenable to this plan. Patient was reminded that she is doing so well with her chemotherapy; briefly discussed results of her latest CT soft tissue neck. In addition, she was also reminded that her respiratory status improving as well. Left message for patient's daughter. Linnette.      Labs:  CMP (10/17/23)  Corrected calcium 10.3 < 10.4 < 10.4  Potassium 4.0 , 4.2   Creatinine 0.82 < 0.80  Phosphorus 4.1   Uric acid 4.5    CBC  Hemoglobin 9.4 < 9.3 < 8.4 < 7.6 (10/12) <8.3   Platelets 088 < 306 < 576 < 460 < 234 (10/12) <182 <167 < 127< 77 (10/4) <61    Imaging:  CT Soft Tissue Neck (10/16/23) - Improving multi spatial mass centered in the left nasopharynx/oropharynx. Again the mass extends up to the skull base in the infratemporal fossa with loss of perineural fat at the left foramen ovale. Decreased mass effect on the airway. No adenopathy by size criteria. Bilateral effusions and extensive airspace disease is stable. 9/30/23 CT PE study Abdomen/pelvis- Hilar and mediastinal lymphadenopathy similar to prior. No acute findings in the abdomen or pelvis. Chronic lytic lesion in T12 vertebral body gradually progressing since 2013 with chronic pathologic fracture. 9/13/23 CTA Chest Rt middle lobe consolidation (PNA), slight regression of previous hilar and mediastinal LNs (had recent PNA in August 2023)      9/14/23 CT Soft Tissue Neck w contrast: soft tissue mass ~ 6.1 x 4.7 x 5.2 cm appears to be centered within Lt oropharynx involving multiple spaces and extends into the nasopharynx. .. multiple small jugular chain nodes on the left. 9/17/23 nasopharynx mass biopsy initial results positive for malignancy favor poorly differentiated carcinoma. Cannot exclude lymphoma. Flow cytometry pending. Please do not hesitate to reach out for questions or concerns. Vania Reeves, [facility], [address] [place] [address]  Hematology-Oncology    Chemotherapy HX:  Started growth factor on 9/28/23 with last day on 10/6/23. WBC decreased at 0.21 with absolute neutrophil count at 0.00 on 10/2/23. Commenced with neutropenic precautions. Patient continued to afebrile then, no complaints of bone pain from growth factor injections since 9/28/23. Review of Systems   Constitutional:  Positive for fatigue. Negative for chills, diaphoresis and fever. HENT:  Negative for ear pain and sore throat. Neck mass    Eyes:  Negative for pain and visual disturbance. Respiratory:  Negative for cough, chest tightness, shortness of breath and wheezing. Cardiovascular:  Negative for chest pain and palpitations.    Gastrointestinal:  Negative for abdominal pain, blood in stool, diarrhea, nausea and vomiting. Genitourinary:  Negative for difficulty urinating, dysuria and hematuria. Musculoskeletal:  Negative for arthralgias and back pain. Skin:  Negative for color change and rash. Neurological:  Positive for weakness. Negative for dizziness, seizures, syncope and headaches. Psychiatric/Behavioral:  Negative for agitation and decreased concentration. The patient is nervous/anxious. All other systems reviewed and are negative.     Objective:     Medication Administration - last 24 hours from 10/16/2023 1101 to 10/17/2023 1101         Date/Time Order Dose Route Action Action by     10/17/2023 0850 EDT chlorhexidine (PERIDEX) 0.12 % oral rinse 15 mL 15 mL Mouth/Throat Given Zainab Laureano RN     10/16/2023 2057 EDT chlorhexidine (PERIDEX) 0.12 % oral rinse 15 mL 15 mL Mouth/Throat Given Ascencion Moody RN     10/17/2023 0849 EDT busPIRone (BUSPAR) tablet 30 mg 30 mg Oral Given Zainab Laureano RN     10/16/2023 2058 EDT busPIRone (BUSPAR) tablet 30 mg 30 mg Oral Given Ascencion Moody RN     10/16/2023 1609 EDT busPIRone (BUSPAR) tablet 30 mg 30 mg Oral Given Gamaliel Dial RN     10/17/2023 0849 EDT gabapentin (NEURONTIN) capsule 300 mg 300 mg Oral Given Zainab Laureano RN     10/16/2023 2058 EDT gabapentin (NEURONTIN) capsule 300 mg 300 mg Oral Given Ascencion Moody RN     10/16/2023 1609 EDT gabapentin (NEURONTIN) capsule 300 mg 300 mg Oral Given Gamaliel Dial RN     10/16/2023 2101 EDT senna oral syrup 8.8 mg 8.8 mg Per NG Tube Not Given Ascencion Moody RN     10/17/2023 0849 EDT famotidine (PEPCID) tablet 20 mg 20 mg Oral Given Zainab Laureano RN     10/16/2023 1748 EDT famotidine (PEPCID) tablet 20 mg 20 mg Oral Given Gamaliel Dial RN     10/17/2023 0850 EDT ondansetron (ZOFRAN) injection 4 mg -- Intravenous HIWOT Ortiz MD     10/17/2023 0849 EDT ondansetron (ZOFRAN) injection 4 mg -- Intravenous HIWOT Covarrubias MD     10/17/2023 0848 EDT sertraline (ZOLOFT) tablet 75 mg 75 mg Per PEG Tube Given Zainab Fordaria, RN     10/16/2023 2100 EDT QUEtiapine (SEROquel) tablet 50 mg 50 mg Oral Given Jose Luis Gerardo, RN     10/17/2023 0849 EDT nicotine (NICODERM CQ) 7 mg/24hr TD 24 hr patch 7 mg 7 mg Transdermal Medication Applied Zainab Georgi, RN     10/17/2023 0847 EDT nicotine (NICODERM CQ) 7 mg/24hr TD 24 hr patch 7 mg 7 mg Transdermal Patch Removed Zainab Waldronmoe, RN     10/17/2023 0848 EDT enoxaparin (LOVENOX) subcutaneous injection 40 mg 40 mg Subcutaneous Given Zainab Georgi, RN     10/17/2023 0719 EDT levalbuterol (Cesar Sony) inhalation solution 1.25 mg 1.25 mg Nebulization Given Kaur Balzarine, RT     10/16/2023 1934 EDT levalbuterol (Cesar Sony) inhalation solution 1.25 mg 1.25 mg Nebulization Given Lauraine Garbe, RT     10/16/2023 1336 EDT levalbuterol (XOPENEX) inhalation solution 1.25 mg 1.25 mg Nebulization Given Annetta Mealing, RT     10/17/2023 0719 EDT ipratropium (ATROVENT) 0.02 % inhalation solution 0.5 mg 0.5 mg Nebulization Given Kaur Balzarine, RT     10/16/2023 1934 EDT ipratropium (ATROVENT) 0.02 % inhalation solution 0.5 mg 0.5 mg Nebulization Given Lauraine Garbe, RT     10/16/2023 1336 EDT ipratropium (ATROVENT) 0.02 % inhalation solution 0.5 mg 0.5 mg Nebulization Given Annetta Mealing, RT     10/17/2023 0503 EDT oxyCODONE (ROXICODONE) oral solution 5 mg 5 mg Oral Given Jose Luis Gerardo, RN     10/16/2023 2058 EDT oxyCODONE (ROXICODONE) oral solution 5 mg 5 mg Oral Given Jose Luis Gerardo, RN     10/16/2023 1143 EDT oxyCODONE (ROXICODONE) oral solution 5 mg 5 mg Oral Given Tiffanie Howe RN     10/17/2023 1008 EDT levofloxacin (LEVAQUIN) tablet 250 mg 250 mg Oral Given Zainab Laureano RN     10/17/2023 0850 EDT levofloxacin (LEVAQUIN) tablet 250 mg -- Oral MAR Unhold Mitch Russo MD     10/17/2023 0849 EDT levofloxacin (LEVAQUIN) tablet 250 mg -- Oral MAR Hold Mitch Russo MD     10/17/2023 3075 EDT fluconazole (DIFLUCAN) tablet 200 mg -- Oral MAR Unhold Rachell Covarrubias MD     10/17/2023 4617 EDT fluconazole (DIFLUCAN) tablet 200 mg -- Oral MAR Hold Rachell Covarrubias MD     10/17/2023 0848 EDT fluconazole (DIFLUCAN) tablet 200 mg 200 mg Oral Given Zainab Laureano, RN     10/17/2023 1008 EDT acyclovir (ZOVIRAX) capsule 400 mg 400 mg Oral Given Zainab Laureano, RN     10/17/2023 0850 EDT acyclovir (ZOVIRAX) capsule 400 mg -- Oral MAR Unhold Rachell Covarrubias MD     10/17/2023 4884 EDT acyclovir (ZOVIRAX) capsule 400 mg -- Oral MAR Hold Rachell Covarrubias MD     10/16/2023 1748 EDT acyclovir (ZOVIRAX) capsule 400 mg 400 mg Oral Given Hima Rae RN     10/17/2023 5884 EDT sulfamethoxazole-trimethoprim (BACTRIM DS) 800-160 mg per tablet 1 tablet -- Oral MAR Unhold Rachell Covarrubias MD     10/17/2023 1959 EDT sulfamethoxazole-trimethoprim (BACTRIM DS) 800-160 mg per tablet 1 tablet -- Oral MAR Hold Rachell Covarrubias MD     10/17/2023 8094 EDT alteplase (CATHFLO) injection 2 mg -- Intracatheter MAR Basim Gaviria MD     10/17/2023 0662 EDT alteplase (CATHFLO) injection 2 mg -- Intracatheter MAR Hold Rachell Covarrubias MD     10/17/2023 0850 EDT sodium chloride 0.9 % infusion -- Intravenous MAR Basim Gaviria MD     10/17/2023 2322 EDT sodium chloride 0.9 % infusion -- Intravenous MAR Micah Bowden MD     10/17/2023 1008 EDT predniSONE tablet 100 mg 100 mg Oral Given Zainab Laureano, RN     10/17/2023 0850 EDT predniSONE tablet 100 mg -- Oral MAR Unhold Rachell Covarrubias MD     10/17/2023 0849 EDT predniSONE tablet 100 mg -- Oral MAR Hold Rachell Covrarubias MD     10/16/2023 1609 EDT predniSONE tablet 100 mg 100 mg Oral Given Hima Rae RN     10/17/2023 0850 EDT ondansetron (ZOFRAN) injection 4 mg -- Intravenous HIWOT Gaviria MD     10/17/2023 0849 EDT ondansetron (ZOFRAN) injection 4 mg -- Intravenous HIWOT Bowden MD     10/17/2023 0850 EDT ondansetron (ZOFRAN) 16 mg, dexamethasone (DECADRON) 10 mg in sodium chloride 0.9 % 50 mL IVPB -- Intravenous MAR Unhold Estee Christopher MD     10/17/2023 0849 EDT ondansetron (ZOFRAN) 16 mg, dexamethasone (DECADRON) 10 mg in sodium chloride 0.9 % 50 mL IVPB -- Intravenous MAR Hold Estee Christopher MD     10/17/2023 0850 EDT DOXOrubicin (ADRIAMYCIN) 18 mg, etoposide (TOPOSAR) 67.6 mg, vinCRIStine (ONCOVIN) 0.7 mg in sodium chloride 0.9 % 500 mL infusion -- Intravenous MAR Unhold Estee Christopher MD     10/17/2023 0849 EDT DOXOrubicin (ADRIAMYCIN) 18 mg, etoposide (TOPOSAR) 67.6 mg, vinCRIStine (ONCOVIN) 0.7 mg in sodium chloride 0.9 % 500 mL infusion -- Intravenous MAR Hold Estee Christopher MD     10/17/2023 0530 EDT DOXOrubicin (ADRIAMYCIN) 18 mg, etoposide (TOPOSAR) 67.6 mg, vinCRIStine (ONCOVIN) 0.7 mg in sodium chloride 0.9 % 500 mL infusion -- Intravenous Hold Steff Campbell RN     10/17/2023 0850 EDT sodium chloride 0.9 % bolus 2,000 mL -- Intravenous MAR Daphney Tai MD     10/17/2023 0940 EDT sodium chloride 0.9 % bolus 2,000 mL -- Intravenous MAR Leah Rosen MD     10/17/2023 0850 EDT cyclophosphamide (CYTOXAN) 1,350 mg in sodium chloride 0.9 % 250 mL IVPB -- Intravenous MAR Unhold Estee Christopher MD     10/17/2023 0849 EDT cyclophosphamide (CYTOXAN) 1,350 mg in sodium chloride 0.9 % 250 mL IVPB -- Intravenous MAR Hold Estee Christopher MD     10/17/2023 0849 EDT amLODIPine (NORVASC) tablet 10 mg 10 mg Oral Given Zainab Georgi, RN     10/16/2023 8395 EDT hydrALAZINE (APRESOLINE) injection 5 mg 5 mg Intravenous Given Vincent Barrios, RN     10/17/2023 1008 EDT metoprolol tartrate (LOPRESSOR) tablet 25 mg 25 mg Oral Given Zainab Diodoardo, RN            /83 (BP Location: Left arm)   Pulse 85   Temp 99 °F (37.2 °C) (Oral)   Resp 20   Ht 5' 1" (1.549 m)   Wt 80.4 kg (177 lb 4 oz)   SpO2 99%   BMI 33.49 kg/m²       Physical Exam  Constitutional:       Appearance: She is not ill-appearing. HENT:      Head: Normocephalic and atraumatic. Comments: Trach cuff in place     Right Ear: External ear normal.      Left Ear: External ear normal.      Nose: No congestion or rhinorrhea. Mouth/Throat:      Mouth: Mucous membranes are moist.   Eyes:      Extraocular Movements: Extraocular movements intact. Conjunctiva/sclera: Conjunctivae normal.      Pupils: Pupils are equal, round, and reactive to light. Neck:      Comments: cuffed Federico XLT #7 on 10/3/23  Cardiovascular:      Rate and Rhythm: Regular rhythm. Tachycardia present. Pulses: Normal pulses. Heart sounds: No murmur heard. No gallop. Pulmonary:      Effort: Pulmonary effort is normal. No respiratory distress. Breath sounds: No wheezing, rhonchi or rales. Comments: Vented  Abdominal:      General: Bowel sounds are normal. There is no distension. Palpations: There is no mass. Tenderness: There is no abdominal tenderness. There is no guarding. Comments: Peg tube in place, on tube feeding   Musculoskeletal:      Right lower leg: No edema. Left lower leg: No edema. Skin:     General: Skin is warm. Capillary Refill: Capillary refill takes less than 2 seconds. Coloration: Skin is not jaundiced or pale. Findings: No rash. Neurological:      General: No focal deficit present. Mental Status: She is alert and oriented to person, place, and time. Psychiatric:         Behavior: Behavior normal.         Thought Content:  Thought content normal.         Judgment: Judgment normal.      Comments: In good spirits         Recent Results (from the past 48 hour(s))   Magnesium    Collection Time: 10/16/23  4:48 AM   Result Value Ref Range    Magnesium 1.9 1.9 - 2.7 mg/dL   Comprehensive metabolic panel    Collection Time: 10/16/23  4:48 AM   Result Value Ref Range    Sodium 138 135 - 147 mmol/L    Potassium 4.2 3.5 - 5.3 mmol/L    Chloride 99 96 - 108 mmol/L    CO2 31 21 - 32 mmol/L    ANION GAP 8 mmol/L    BUN 34 (H) 5 - 25 mg/dL    Creatinine 0.80 0.60 - 1.30 mg/dL    Glucose 164 (H) 65 - 140 mg/dL    Calcium 9.7 8.4 - 10.2 mg/dL    Corrected Calcium 10.4 (H) 8.3 - 10.1 mg/dL    AST 27 13 - 39 U/L    ALT 39 7 - 52 U/L    Alkaline Phosphatase 130 (H) 34 - 104 U/L    Total Protein 6.2 (L) 6.4 - 8.4 g/dL    Albumin 3.1 (L) 3.5 - 5.0 g/dL    Total Bilirubin 0.34 0.20 - 1.00 mg/dL    eGFR 74 ml/min/1.73sq m   CBC and differential    Collection Time: 10/16/23  4:48 AM   Result Value Ref Range    WBC 10.02 4.31 - 10.16 Thousand/uL    RBC 3.12 (L) 3.81 - 5.12 Million/uL    Hemoglobin 9.3 (L) 11.5 - 15.4 g/dL    Hematocrit 29.7 (L) 34.8 - 46.1 %    MCV 95 82 - 98 fL    MCH 29.8 26.8 - 34.3 pg    MCHC 31.3 (L) 31.4 - 37.4 g/dL    RDW 15.4 (H) 11.6 - 15.1 %    MPV 9.7 8.9 - 12.7 fL    Platelets 451 (H) 892 - 390 Thousands/uL    nRBC 0 /100 WBCs   Uric acid    Collection Time: 10/16/23  4:48 AM   Result Value Ref Range    Uric Acid 4.4 2.0 - 7.5 mg/dL   LD,Blood    Collection Time: 10/16/23  4:48 AM   Result Value Ref Range     (H) 140 - 271 U/L   Magnesium    Collection Time: 10/17/23  5:39 AM   Result Value Ref Range    Magnesium 2.1 1.9 - 2.7 mg/dL   Comprehensive metabolic panel    Collection Time: 10/17/23  5:39 AM   Result Value Ref Range    Sodium 137 135 - 147 mmol/L    Potassium 4.0 3.5 - 5.3 mmol/L    Chloride 99 96 - 108 mmol/L    CO2 31 21 - 32 mmol/L    ANION GAP 7 mmol/L    BUN 39 (H) 5 - 25 mg/dL    Creatinine 0.82 0.60 - 1.30 mg/dL    Glucose 153 (H) 65 - 140 mg/dL    Calcium 9.6 8.4 - 10.2 mg/dL    Corrected Calcium 10.3 (H) 8.3 - 10.1 mg/dL    AST 24 13 - 39 U/L    ALT 37 7 - 52 U/L    Alkaline Phosphatase 113 (H) 34 - 104 U/L    Total Protein 5.9 (L) 6.4 - 8.4 g/dL    Albumin 3.1 (L) 3.5 - 5.0 g/dL    Total Bilirubin 0.34 0.20 - 1.00 mg/dL    eGFR 72 ml/min/1.73sq m   CBC and differential    Collection Time: 10/17/23  5:39 AM   Result Value Ref Range    WBC 6.29 4.31 - 10.16 Thousand/uL    RBC 3.10 (L) 3.81 - 5.12 Million/uL    Hemoglobin 9.4 (L) 11.5 - 15.4 g/dL    Hematocrit 29.2 (L) 34.8 - 46.1 %    MCV 94 82 - 98 fL    MCH 30.3 26.8 - 34.3 pg    MCHC 32.2 31.4 - 37.4 g/dL    RDW 14.9 11.6 - 15.1 %    MPV 9.8 8.9 - 12.7 fL    Platelets 328 (H) 934 - 390 Thousands/uL   Uric acid    Collection Time: 10/17/23  5:39 AM   Result Value Ref Range    Uric Acid 4.5 2.0 - 7.5 mg/dL   LD,Blood    Collection Time: 10/17/23  5:39 AM   Result Value Ref Range     140 - 271 U/L       US right upper quadrant    Result Date: 9/22/2023  Narrative: RIGHT UPPER QUADRANT ULTRASOUND INDICATION:     CT finding N/V +Cancino. COMPARISON: CT abdomen/pelvis 9/21/2023 TECHNIQUE:   Real-time ultrasound of the right upper quadrant was performed with a curvilinear transducer with both volumetric sweeps and still imaging techniques. FINDINGS: Evaluation limited as per sonographer's note in PACS. PANCREAS:  Visualized portions of the pancreas are within normal limits. AORTA AND IVC:  Visualized portions are normal for patient age. LIVER: Size:  Within normal range. The liver measures 15.3 cm in the midclavicular line. Contour:  Surface contour is smooth. Parenchyma:  Echogenicity and echotexture are within normal limits. No liver mass identified. Limited imaging of the main portal vein shows it to be patent and hepatopetal. BILIARY: The gallbladder is normal in caliber. Gallbladder wall thickness upper limits of normal. No pericholecystic fluid. There is gallbladder sludge without evidence for shadowing calculi. No sonographic Cancino sign. No intrahepatic biliary dilatation. CBD measures 4.0 mm. No choledocholithiasis. KIDNEY: Right kidney measures 11.9 x 5.2 x 6.4 cm. Volume 207.6 mL Several simple cysts, the largest of which measures 4.1 cm. 2.8 cm septated cyst in the upper pole. No hydronephrosis or perinephric collection. ASCITES:   None. Impression: No cholelithiasis.  Gallbladder sludge is present with wall thickness upper limits of normal. Negative sonographic Cancino sign. Findings may be sequela of chronic gallbladder dysfunction. Consider follow-up with a HIDA scan if it would alter patient management. Workstation performed: [id number]     CT abdomen pelvis w contrast    Result Date: 9/21/2023  Narrative: CT ABDOMEN AND PELVIS WITH IV CONTRAST INDICATION:   Head/neck cancer, staging lymohoma staging prior to treatment with chemo. COMPARISON: 9/13/2023. TECHNIQUE:  CT examination of the abdomen and pelvis was performed. Multiplanar 2D reformatted images were created from the source data. This examination, like all CT scans performed in the [facility], was performed utilizing techniques to minimize radiation dose exposure, including the use of iterative reconstruction and automated exposure control. Radiation dose length product (DLP) for this visit:  813 mGy-cm IV Contrast:  100 mL of iohexol (OMNIPAQUE) Enteric Contrast:  Enteric contrast was not administered. FINDINGS: Mildly degraded by respiratory motion. ABDOMEN LOWER CHEST: There is bibasilar atelectasis. LIVER/BILIARY TREE:  Unremarkable. GALLBLADDER:  No calcified gallstones. There may be mild gallbladder wall thickening though evaluation is degraded by respiratory motion. No surrounding inflammatory changes. SPLEEN:  Unremarkable. PANCREAS:  Unremarkable. ADRENAL GLANDS:  Unremarkable. KIDNEYS/URETERS: Multiple bilateral renal cysts. No hydronephrosis. STOMACH AND BOWEL: There is colonic diverticulosis without evidence of acute diverticulitis. APPENDIX:  No findings to suggest appendicitis. ABDOMINOPELVIC CAVITY:  No ascites. No pneumoperitoneum. No lymphadenopathy. VESSELS:  Unremarkable for patient's age. PELVIS REPRODUCTIVE ORGANS:  Unremarkable for patient's age. URINARY BLADDER:  Unremarkable. ABDOMINAL WALL/INGUINAL REGIONS:  Fat containing right inguinal hernia noted. Fat-containing umbilical hernia.  OSSEOUS STRUCTURES: Again seen is a destructive lytic lesion in the T12 vertebral body with sclerotic borders, progressed from 2013 and with chronic appearing pathologic fracture. Impression: 1. No abdominal lymphadenopathy. 2.  Question gallbladder wall thickening versus motion artifact. If there is clinical concern for gallbladder pathology, ultrasound is recommended. 3.  Chronic T12 lytic lesion with pathologic fracture. Workstation performed: [id number]     MRI soft tissue neck wo and w contrast    Result Date: 9/21/2023  Narrative: MRI OF THE SOFT TISSUES OF THE NECK - WITH AND WITHOUT CONTRAST INDICATION: Oropharyngeal mass s/p biopsy (+) for carcinoma COMPARISON: Neck CT from 9/14/2023 TECHNIQUE:  Multiplanar, multisequence imaging of the soft tissues of the neck was performed before and after gadolinium administration. . IV Contrast:  7.5 mL of Gadobutrol injection (SINGLE-DOSE) IMAGE QUALITY: Motion degrades images. FINDINGS: VISUALIZED BRAIN PARENCHYMA: No acute or suspicious findings. Please see today's brain MR report. VISUALIZED ORBITS AND PARANASAL SINUSES: Globes and orbits are within normal limits. Pan paranasal sinus mucosal thickening, greatest involving ethmoids and sphenoids. NASAL CAVITY, NASOPHARYNX, and OROHYPOPHARYNX and LARYNX: Approximately 7.5 x 4.0 x 7.8 cm (length X depth X width) soft tissue mass, epicenter likely the left lateral pharyngeal recess. Enhances and restricts diffusion. Central, irregular fluid signal intensity area without enhancement appears contiguous with fluid around an endotracheal tube, likely trapped secretions and circumferential mass. Mass extends from the left greater than right nasopharynx superiorly to the cricoid cartilage inferiorly. Extends to the posterior nasal cavity. Displaces the soft palate, uvula and tongue base anteriorly. Oral cavity is otherwise within normal limits. Displaces the left pterygoid muscles anterolaterally.  Extends posterior to the angle of the mandible approximately 1.4 cm, abutting and mildly laterally displacing the deep parotid, inseparable from the gland. Effaces the left parapharyngeal fat. Discrete epiglottis not seen, grossly anteriorly displaced. Extends along the left aryepiglottic fold and posterior pharyngeal wall to the to the inferior supraglottic airway, grossly terminating just above or at the left false cord. Extends to the left carotid space, encircles the carotid with severe narrowing and posterior-lateral displacement of the distal cervical and most proximal petrous vessel. Otherwise preserved left internal carotid flow-void. Posterior-lateral displacement  and occlusion of the internal jugular vein at the level of the angle of the mandible in the distal neck. Mass extends to the proximal jugular foramen. Laryngeal ventricles and true cords appear preserved. Normal fat signal  preserved in the cricoid and thyroid cartilages. Enteric and endotracheal tubes are displaced rightward. Trace retropharyngeal effusion. THYROID GLAND:   Within normal limits. PAROTID AND SUBMANDIBULAR GLANDS: Both submandibular and the right parotid glands are within normal limits. Deep left parotid involvement mentioned above. LYMPH NODES: Similar small jugular chain nodes more numerous on the left than the right, but none considered pathologically enlarged. 0.7 x 0.5 cm suspicious right retropharyngeal node (4/27). Left retropharyngeal space is obliterated by mass, no separate adenopathy. VASCULAR STRUCTURES: Left carotid a jugular involvement described above. Right carotid artery and jugular veins are within normal limits. THORACIC INLET: Similar to CT. Dependent lung opacities. CERVICAL SPINE: Normal appearance. OTHER: Left greater than right mastoid effusions with patchy left enhancement and restricted diffusion were described in today's brain MR report. Asymmetric opacity involves the left petrous apex.  Normal appearance of the IACs and inner ear structures. No cerebellopontine angle mass. Impression: Known, large left neck mass described in detail above. 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 COMPARISON: 6/17/2017 CT of the brain TECHNIQUE:  Multiplanar/multisequence MRI of the brain was performed administration of contrast. IV Contrast:  7.5 mL of Gadobutrol injection (SINGLE-DOSE) IMAGE QUALITY:  Diagnostic. FINDINGS: BRAIN PARENCHYMA: No  hemorrhage, extra-axial fluid collection, acute infarct, mass effect or midline shift. Mild, focal patchy periventricular and subcortical white matter foci of abnormal T2 and FLAIR hyperintensity are nonspecific, but usually secondary to changes of microangiopathy. Small developmental venous angioma in the left posterior frontal lobe, typically clinically insignificant. No suspicious enhancement or masses. VENTRICLES:  Within normal limits for age. SELLA AND PITUITARY GLAND:  Within normal limits. ORBITS:  Within normal limits. PARANASAL SINUSES: Mucosal thickening. VASCULATURE: Preserved skull base flow voids. Normal enhancement. CALVARIUM AND SKULL BASE: Bilateral mastoid effusions are likely secondary mass, related to eustachian tube obstruction from nasopharyngeal mass. Patient is also intubated. Small mucosal retention cyst at the right fossa of Rosenmuller. Small ill-defined foci of enhancement in the superior mastoid and asymmetric left mastoid restricted diffusion. No coalescence or discrete mass. Skull and visualized cervical spine are within normal limits. EXTRACRANIAL SOFT TISSUES:  A nasopharyngeal mass will be described in further detail on today's neck MR report. Impression: No evidence of brain metastases. 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  Asymmetric patchy enhancement and restricted diffusion in the left mastoid. The differential includes neoplastic involvement and infection. Workstation performed: [id number]     XR chest portable    Result Date: 9/20/2023  Narrative: CHEST INDICATION:   NG tube placement. COMPARISON: 9/17/2023 EXAM PERFORMED/VIEWS:  XR CHEST PORTABLE FINDINGS: Tracheostomy tube is in stable position. Distal portions of nasogastric tube are seen below the hemidiaphragm within the left upper abdomen. The tip of the tube extends beyond the inferior margin of this study. Heart shadow is enlarged but unchanged from prior exam. There is mild pulmonary vascular congestion. The left costophrenic angle is obscured. Hazy opacities are additionally noted within the right costophrenic angle. No evidence of acute osseous abnormality. Impression: 1. Nasogastric tube is seen with its distal portions within the stomach. The tip of the tube courses beyond the inferior margin of the study. 2. Pulmonary vascular congestion with obscuration of the left hemidiaphragm. This is stable from prior radiograph and may represent small left effusion versus consolidation. 3. Right basilar atelectasis versus trace right effusion. Workstation performed: [id number]     XR chest portable    Result Date: 9/18/2023  Narrative: CHEST INDICATION:   Assess. COMPARISON:  None EXAM PERFORMED/VIEWS:  XR CHEST PORTABLE Images: 2 FINDINGS: Tracheostomy tube is seen in appropriate position. A feeding tube is seen extending down below the dome of the diaphragm Cardiomegaly seen Increased lung markings seen suggest congestion left base is obscured Osseous structures appear within normal limits for patient age. Impression: Increased lung markings seen suggest congestion. The left base is obscured No worsening Workstation performed: [id number]     CT soft tissue neck w contrast    Result Date: 9/14/2023  Narrative: CT NECK WITH CONTRAST INDICATION:   Laryngeal edema COMPARISON:  None.  TECHNIQUE: Axial, sagittal, and coronal 2D reformatted images were created from the axial source data and submitted for interpretation. Radiation dose length product (DLP) for this visit:  769 mGy-cm . This examination, like all CT scans performed in the [facility], was performed utilizing techniques to minimize radiation dose exposure, including the use of iterative reconstruction and automated exposure control. IV Contrast:  85 mL of iohexol (OMNIPAQUE) IMAGE QUALITY:  Diagnostic. FINDINGS: VISUALIZED BRAIN PARENCHYMA:  No acute intracranial pathology of the visualized brain parenchyma. VISUALIZED ORBITS: Normal visualized orbits. PARANASAL SINUSES: Normal visualized paranasal sinuses. Nasopharynx, oropharynx AND HYPOPHARYNX: There is a large heterogeneously enhancing mass identified within the neck involving multiple spaces. The origin is difficult to ascertain given the large size. This mass measures approximately 6.1 x 4.7 x 5.2 cm and appears to be centered within the left oropharynx. The mass extends superiorly into the nasopharynx left more so than right and into the posterior aspect of the nasal cavity. The mass also extends across the midline within the oropharynx and inferiorly into the left lateral oropharynx but not below the laryngeal ventricle. The mass extends laterally into the infratemporal fossa and parapharyngeal fat and is inseparable from infratemporal musculature and deep lobe of the parotid gland. There is extension into the prevertebral space where the mass is inseparable from the anterior paraspinal muscle on the left and posteriorly and laterally into the carotid space where the mass is displacing and inseparable from jugular and carotid. The mass encases the cervical internal carotid artery just below the level of the skull base resulting in narrowing of the vessel and the mass compresses and occludes the jugular vein below the skull base.  The vessel does reconstitute via collateral vessels as it  extends inferiorly within the neck. The mass extends superiorly into the skull base inseparable from the carotid canal and jugular foramen. There is severe airway compromise within the posterior oral cavity and superior oropharynx. ET tube and OG tube are present. THYROID GLAND:  Unremarkable. PAROTID AND SUBMANDIBULAR GLANDS: Normal parotid and submandibular glands other than the mass abutting the deep lobe of the left parotid gland. LYMPH NODES: Multiple small jugular chain nodes are seen on the left. VASCULAR STRUCTURES: As described above the mass partially encases the cervical internal carotid artery, narrowing the vessel and occludes the jugular vein from the skull base to the mid neck. Below this the vessel is unremarkable due to collateral reconstitution. THORACIC INLET: Posterior left upper lobe consolidation may represent atelectasis or pneumonia. Mild patchy groundglass opacity within the upper lung fields. BONY STRUCTURES: At T1-2 there is a left paramedian bridging osteophyte resulting in mild to moderate left anterior lateral canal stenosis. Impression: Large enhancing soft tissue mass identified within the left neck involving multiple spaces. This appears to be centered within the left oropharynx with extensions as described above into multiple spaces. This results in significant airway compromise. 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 Small level 3 and level 4 nodes are seen within the neck. I personally discussed this study with ICU resident on 9/14/2023 4:44 PM. Workstation performed: [id number]     Bronchoscopy    Result Date: 9/14/2023  Narrative: Table formatting from the original result was not included.  [address] [place] [address] [phone] DATE OF SERVICE: 9/14/23 PHYSICIAN(S): Attending: No Staff Documented Fellow: No Staff Documented INDICATION: Acute respiratory failure with hypoxia ([address]) POST-OP DIAGNOSIS: See the impression below. PREPROCEDURE: Standard airway preparation completed per respiratory therapy protocol. Informed consent was obtained. Images reviewed prior to the procedure. A Time Out was performed. No suspicion or identified risk for TB or other airborne infectious disease; bronchoscopy procedure being performed for diagnostic purposes. PROCEDURE: Bronchoscopy DETAILS OF PROCEDURE: Patient was taken to the procedure room where a time out was performed to confirm correct patient and correct procedure. The patient was already under sedation prior to the procedure. The patient's blood pressure, ECG, heart rate, level of consciousness, respirations and oxygen were monitored throughout the procedure. The patient experienced no blood loss. The scope was introduced through the endotracheal tube. The procedure was moderately difficult due to patient intolerance and persistent coughing. In response to procedure difficulty, deeper sedation was employed. The patient tolerated the procedure well. There were no apparent adverse events. ANESTHESIA INFORMATION: ASA: ASA status not filed in the log. Anesthesia Type: Anesthesia type not filed in the log.  FINDINGS: The lower trachea, main sujata, left main stem, KARTHIK, lingula, LLL, right main stem, RUL, bronchus intermedius, RML and RLL appeared normal. Left lower lung zone with no endobronchial lesions, left upper and lingula both appear normal Right upper, right middle and right lower lobes all appeared normal with no endobronchial lesions Endotracheal tube was retracted 3 cm under direct visualization with the bronchoscope Bronchoalveolar lavage was performed x1 in the right lateral segment (RB4) with 60 mL of saline instilled and a total return of 40 mL; the fluid appeared cloudy SPECIMENS: ID Type Source Tests Collected by Time Destination 1 :  Lavage Lung, Right Middle Lobe Bronchoalveolar Lavage PULMONARY CYTOLOGY Marylee Carpen, MD 9/14/2023 12:55 PM  A :  Bronchial Lung, Right Middle Lobe Bronchoalveolar Lavage FUNGAL CULTURE, VIRUS CULTURE, BRONCHIAL CULTURE AND GRAM STAIN, LEGIONELLA CULTURE, PNEUMOCYSTIS SMEAR BY DFA ([facility]), AFB CULTURE WITH STAIN Elisa Hurt MD 9/14/2023 12:57 PM       Impression: BAL of the right middle lobe Endotracheal tube was retracted under direct visualization Tongue still appears swollen, vocal cords visualized outside of the endotracheal tube with the bronchoscope, no significant edema or mass noted RECOMMENDATION:  Follow-up infectious work-up      XR chest 1 view portable    Result Date: 9/13/2023  Narrative: CHEST INDICATION:   post intubation. COMPARISON: Same day chest radiograph and subsequent same day CT chest. Chest x-ray 8/27/2023. EXAM PERFORMED/VIEWS:  XR CHEST PORTABLE FINDINGS: Endotracheal tube terminates approximately 4 cm above the sujata. Heart shadow is enlarged but unchanged from prior exam. Bibasilar airspace opacities are again demonstrated. No appreciable pneumothorax. No evidence of acute osseous abnormality. Lucent lesion within T12 is better demonstrated on same-day CT. Impression: 1. Endotracheal tube terminates 4 cm above the sujata. 2. Redemonstration of bibasilar airspace opacities. Workstation performed: [id number]     XR chest 1 view portable    Result Date: 9/13/2023  Narrative: CHEST INDICATION:   post intubation, adjusting ET Tube. COMPARISON:  None EXAM PERFORMED/VIEWS:  XR CHEST PORTABLE FINDINGS: Endotracheal tube terminates approximately 3 cm above the sujata. Cardiomediastinal silhouette appears unremarkable. Bibasilar airspace opacities are again noted. No appreciable pneumothorax or pleural effusion. No evidence of acute osseous abnormality. Lucent lesion within T12 is better demonstrated on same-day CT. Impression: 1. Endotracheal tube terminates 3.3 cm above the sujata. 2. Bibasilar airspace opacities are again demonstrated.  Workstation performed: [id number]     XR chest portable    Result Date: 9/13/2023  Narrative: CHEST INDICATION:   sob. COMPARISON: 8/27/2023. Subsequent CT chest performed the same day. EXAM PERFORMED/VIEWS:  XR CHEST PORTABLE FINDINGS: Heart shadow is enlarged but unchanged from prior exam. Hazy opacities are noted within the right lung base, possibly atelectasis versus pneumonia. Left basilar opacity is similar to prior radiograph. No appreciable pneumothorax. No evidence of acute osseous abnormality. Cystic lesion within the T12 vertebral body is better characterized on same-day CT. Impression: 1. Hazy bibasilar airspace opacities which may represent atelectasis versus pneumonia. Left basilar opacity is similar to recent radiograph while right basilar opacity is new Workstation performed: [id number]     CTA ED chest PE Study    Result Date: 9/13/2023  Narrative: CTA - CHEST WITH IV CONTRAST - PULMONARY ANGIOGRAM INDICATION:   R/O PE. Reports shortness of breath since being discharged from the hospital 3 weeks ago for pneumonia. Fatigue. Productive cough with white sputum. Angioedema. COMPARISON: Chest radiograph 9/13/2023, chest CT 8/25/2023, thoracic spine CT 11/13/2013. TECHNIQUE: CT angiogram timed for optimal opacification of the pulmonary arteries. Axial, sagittal, and coronal 2D reformats created from source data. Coronal 3D MIP postprocessing on the acquisition scanner. Radiation dose length product (DLP):  472 mGy-cm . Radiation dose exposure minimized using iterative reconstruction and automated exposure control. IV Contrast:  85 mL of iohexol (OMNIPAQUE) FINDINGS: PULMONARY ARTERIES:  No pulmonary embolus. Moderate pulmonary artery enlargement. LUNGS: Mild patchy new consolidation in the medial segment right middle lobe. Improving opacity in the left upper lobe with mild residual atelectasis/scar. Redemonstration of mild dependent atelectasis in both lower lobes. Severe emphysema. AIRWAYS: No significant filling defects.  ET tube 4 cm above the sujata. PLEURA:  Unremarkable. HEART/GREAT VESSELS: Moderate cardiomegaly. MEDIASTINUM AND PRASANTH: Slight regression of hilar and mediastinal lymphadenopathy. The largest node was previously 2.5 x 2.0 cm in the right infrahilar region and is now 1.9 x 1.3 cm (501/94). CHEST WALL AND LOWER NECK: Unremarkable. UPPER ABDOMEN: Right renal cysts. OSSEOUS STRUCTURES: Redemonstration of the large cystic lesion with sclerotic margins in T12 with destruction of the superior and inferior endplates, present as a much smaller thin-walled cystic lesion on the thoracic spine CT from 2013, imaged on a thoracic spine MRI from 2020. Impression: No pulmonary embolus. Patchy consolidation right middle lobe, new from 8/25/2023, compatible with pneumonia. Slight regression of previous hilar and mediastinal lymphadenopathy. Improving left upper lobe opacity which may have been due to pneumonia with residual atelectasis/scar. Persistent partial atelectasis of the lower lobes. 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 Workstation performed: [id number]     Echo complete w/ contrast if indicated    Result Date: 8/28/2023  Narrative:   Left Ventricle: Left ventricular cavity size is small. Wall thickness is increased. There is severe concentric hypertrophy. The left ventricular ejection fraction is 60%. Systolic function is normal. Wall motion is normal. Diastolic function is mildly abnormal, consistent with grade I (abnormal) relaxation. There is no LV dynamic obstruction at rest.   Right Ventricle: Right ventricular cavity size is normal. Systolic function is normal.   Left Atrium: The atrium is mildly dilated. Mitral Valve: There is mild to moderate regurgitation. There is systolic anterior motion of the chordal apparatus with late peaking gradient 29 mmHg during Valsalva maneuver.    Pericardium: There is a trivial pericardial effusion along the right atrial free wall. XR chest portable ICU    Result Date: 8/28/2023  Narrative: CHEST INDICATION:   Acute hypoxic respiratory failure. COMPARISON: 8/25/2023 EXAM PERFORMED/VIEWS:  XR CHEST PORTABLE ICU FINDINGS: Heart shadow is enlarged but unchanged from prior exam. There is stable left-sided volume loss secondary to left basilar atelectasis. No pneumothorax or pleural effusion. Osseous structures appear within normal limits for patient age. Impression: Stable volume loss on the left secondary to left lower lobe atelectasis. Workstation performed: [id number]     VAS lower limb venous duplex study, complete bilateral    Result Date: 8/27/2023  Narrative:  [facility] REPORT CLINICAL: Indications: Patient presents with bilateral lower extremity pain x 1 days. Operative History: cardiac surgery-date unknown. Risk Factors The patient has history of HTN and CKD. She has no history of Hyperlipidemia. CONCLUSION:  Impression: RIGHT LOWER LIMB: No evidence of acute or chronic deep vein thrombosis. No evidence of superficial thrombophlebitis noted. Doppler evaluation shows a normal response to augmentation maneuvers. . Popliteal, posterior tibial and anterior tibial arterial Doppler waveform's are triphasic. LEFT LOWER LIMB: No evidence of acute or chronic deep vein thrombosis. No evidence of superficial thrombophlebitis noted. Doppler evaluation shows a normal response to augmentation maneuvers. Popliteal, posterior tibial and anterior tibial arterial Doppler waveform's are triphasic. SIGNATURE: Electronically Signed by: Francia Benavides on 2023-08-27 08:12:52 PM    CTA ED chest PE Study    Result Date: 8/25/2023  Narrative: CTA - CHEST WITH IV CONTRAST - PULMONARY ANGIOGRAM INDICATION:   Increased SOB, recent COVID diagnosis.  COMPARISON: MRI from 3/18/2020 as well as bone scan from 7/2/2019 and thoracic spine from 11/13/2013 TECHNIQUE: CTA examination of the chest was performed using angiographic technique according to a protocol specifically tailored to evaluate for pulmonary embolism. Multiplanar 2D reformatted images were created from the source data. In addition, coronal 3D MIP postprocessing was performed on the acquisition scanner. The study is limited by some respiratory motion artifacts especially in the lower lobes on the left where there is crowding of vessels due to atelectatic changes. Radiation dose length product (DLP) for this visit:  370 mGy-cm . This examination, like all CT scans performed in the [facility], was performed utilizing techniques to minimize radiation dose exposure, including the use of iterative reconstruction and automated exposure control. IV Contrast:  80 mL of iohexol (OMNIPAQUE) FINDINGS: PULMONARY ARTERIAL TREE: Limited visualization left lower lobe however there is suspicion for a small embolus in the posterobasal segment on axial image 139, series 305 and coronal image 142. No definite filling defect is seen elsewhere. The main pulmonary artery is significantly dilated at 4.2 cm. The RV LV ratio is elevated at 1.1 cm. The primary pulmonary vascular stent minutes are also dilated chronicity is uncertain. LUNGS: Emphysema is noted with blebs at the apices. There is peripheral consolidation in the left upper lobe. Air bronchogram is not well seen and there is mucous occlusion of the left mainstem bronchus with volume loss of the left upper lobe as well as  left lower lobe to a lesser extent. Some aeration in the left lower lobe is still visible. PLEURA:  Unremarkable. HEART/GREAT VESSELS:  Unremarkable for patient's age. No thoracic aortic aneurysm. MEDIASTINUM AND PRASANTH: 10 mm pretracheal node is identified. 9 mm superior mediastinal node is identified. 10 mm prevascular node is identified. Subcarinal node measures 1.5 cm. Hilar adenopathy is also demonstrated. Right hilar node is larger measuring 1.8 cm in short axis on image 138.  CHEST WALL AND LOWER NECK:   Unremarkable. VISUALIZED STRUCTURES IN THE UPPER ABDOMEN: Low-density cyst is seen in the right kidney. . OSSEOUS STRUCTURES: There appears to be a destructive lesion involving the T12 vertebral body eroding both endplates and much of the vertebral body this does not appear to represent a Schmorl's node. A cystic lesion was noted in 2013 at this location however the current lesion is larger with loss of endplates. MRI also imaged this lesion in 2020 the lesion appears somewhat larger on the current examination however. Impression: Limited study. There is equivocal embolus in the posterior basal segment left lower lobe. Other smaller segments are difficult to assess due to motion and vascular crowding. There is mucous plugging in the left mainstem bronchus with volume loss in portions of the left lower lobe and left upper lobe. Aspiration pneumonia is not excluded. There is dilatation of the main pulmonary artery and proximal pulmonary segment suggesting pulmonary hypertension this is more likely chronic than acute given the degree of distention. Emphysema is present. There is significant mediastinal and hilar adenopathy suggesting underlying neoplasm more likely than simple reactive nodes. Enlarging lesion at T12 is again demonstrated of uncertain etiology. This has been present since 2013. Perhaps a plasmacytoma is a consideration. Neoplastic work-up is advised. Pulmonology consultation is also advised to assess endobronchial obstruction on the left. This was discussed with resident physician Dr. Kiah Arnold at 4:58 p.m. Follow-up was marked in the epic system Workstation performed: [id number]     XR chest 1 view portable    Result Date: 8/25/2023  Narrative: CHEST INDICATION:   SOB. COMPARISON: 8/21/2021 EXAM PERFORMED/VIEWS:  XR CHEST PORTABLE Single view FINDINGS: Development of CHF. Probable left basal infiltrate. Cardiomediastinal silhouette has become more enlarged.  No pneumothorax or pleural effusion. Osseous structures appear within normal limits for patient age. Impression: Increased cardiomegaly. Development of CHF.  Probable left basal infiltrate Workstation performed: [id number]

## 2023-10-17 NOTE — QUICK NOTE
Updated Bell Christiancarteryoselyn, patient's daughter, at bedside. She is aware that chemo was held today and that treatment will be reassessed tomorrow. She is open to having another family meeting if indicated.

## 2023-10-17 NOTE — NURSING NOTE
Attempted X2 at PICC insertion. Both times, the catheter would not thread past the shoulder. Catheter cut to 44cm and left in place for possible IR thread. There are 15cm External catheter length. RN aware. Attempt to call IR. Will try again.

## 2023-10-17 NOTE — QUICK NOTE
Called to patient's room by nursing staff. Chemotherapy certified nurse had arrived to patient's room to pause chemo / assist with blood draw and noted patient's PICC line was dislodged by approx 35 cm with chemotherapy agent running. Order placed to remove PICC line. New vascular access consult placed for new PICC line. RUE soft no erythema noted. RUE soft, but patient states that the area is tender.

## 2023-10-18 LAB
ALBUMIN SERPL BCP-MCNC: 3 G/DL (ref 3.5–5)
ALP SERPL-CCNC: 102 U/L (ref 34–104)
ALT SERPL W P-5'-P-CCNC: 34 U/L (ref 7–52)
ANION GAP SERPL CALCULATED.3IONS-SCNC: 8 MMOL/L
ANISOCYTOSIS BLD QL SMEAR: PRESENT
AST SERPL W P-5'-P-CCNC: 19 U/L (ref 13–39)
BASOPHILS # BLD MANUAL: 0 THOUSAND/UL (ref 0–0.1)
BASOPHILS NFR MAR MANUAL: 0 % (ref 0–1)
BILIRUB SERPL-MCNC: 0.35 MG/DL (ref 0.2–1)
BUN SERPL-MCNC: 49 MG/DL (ref 5–25)
CALCIUM ALBUM COR SERPL-MCNC: 10.4 MG/DL (ref 8.3–10.1)
CALCIUM SERPL-MCNC: 9.6 MG/DL (ref 8.4–10.2)
CHLORIDE SERPL-SCNC: 96 MMOL/L (ref 96–108)
CO2 SERPL-SCNC: 29 MMOL/L (ref 21–32)
CREAT SERPL-MCNC: 0.89 MG/DL (ref 0.6–1.3)
EOSINOPHIL # BLD MANUAL: 0 THOUSAND/UL (ref 0–0.4)
EOSINOPHIL NFR BLD MANUAL: 0 % (ref 0–6)
ERYTHROCYTE [DISTWIDTH] IN BLOOD BY AUTOMATED COUNT: 14.6 % (ref 11.6–15.1)
GFR SERPL CREATININE-BSD FRML MDRD: 65 ML/MIN/1.73SQ M
GLUCOSE SERPL-MCNC: 135 MG/DL (ref 65–140)
HCT VFR BLD AUTO: 27.8 % (ref 34.8–46.1)
HGB BLD-MCNC: 8.9 G/DL (ref 11.5–15.4)
LDH SERPL-CCNC: 198 U/L (ref 140–271)
LYMPHOCYTES # BLD AUTO: 0.32 THOUSAND/UL (ref 0.6–4.47)
LYMPHOCYTES # BLD AUTO: 5 % (ref 14–44)
MCH RBC QN AUTO: 29.7 PG (ref 26.8–34.3)
MCHC RBC AUTO-ENTMCNC: 32 G/DL (ref 31.4–37.4)
MCV RBC AUTO: 93 FL (ref 82–98)
MONOCYTES # BLD AUTO: 0.06 THOUSAND/UL (ref 0–1.22)
MONOCYTES NFR BLD: 1 % (ref 4–12)
NEUTROPHILS # BLD MANUAL: 6.03 THOUSAND/UL (ref 1.85–7.62)
NEUTS SEG NFR BLD AUTO: 94 % (ref 43–75)
PLATELET # BLD AUTO: 774 THOUSANDS/UL (ref 149–390)
PLATELET BLD QL SMEAR: ABNORMAL
PMV BLD AUTO: 9.6 FL (ref 8.9–12.7)
POTASSIUM SERPL-SCNC: 4.3 MMOL/L (ref 3.5–5.3)
PROT SERPL-MCNC: 5.6 G/DL (ref 6.4–8.4)
RBC # BLD AUTO: 3 MILLION/UL (ref 3.81–5.12)
RBC MORPH BLD: PRESENT
SODIUM SERPL-SCNC: 133 MMOL/L (ref 135–147)
URATE SERPL-MCNC: 4.7 MG/DL (ref 2–7.5)
WBC # BLD AUTO: 6.42 THOUSAND/UL (ref 4.31–10.16)

## 2023-10-18 PROCEDURE — 85027 COMPLETE CBC AUTOMATED: CPT | Performed by: INTERNAL MEDICINE

## 2023-10-18 PROCEDURE — 94003 VENT MGMT INPAT SUBQ DAY: CPT

## 2023-10-18 PROCEDURE — 83615 LACTATE (LD) (LDH) ENZYME: CPT | Performed by: INTERNAL MEDICINE

## 2023-10-18 PROCEDURE — 99291 CRITICAL CARE FIRST HOUR: CPT | Performed by: STUDENT IN AN ORGANIZED HEALTH CARE EDUCATION/TRAINING PROGRAM

## 2023-10-18 PROCEDURE — 80053 COMPREHEN METABOLIC PANEL: CPT | Performed by: INTERNAL MEDICINE

## 2023-10-18 PROCEDURE — NC001 PR NO CHARGE: Performed by: RADIOLOGY

## 2023-10-18 PROCEDURE — 84550 ASSAY OF BLOOD/URIC ACID: CPT | Performed by: INTERNAL MEDICINE

## 2023-10-18 PROCEDURE — 94640 AIRWAY INHALATION TREATMENT: CPT

## 2023-10-18 PROCEDURE — 99233 SBSQ HOSP IP/OBS HIGH 50: CPT | Performed by: INTERNAL MEDICINE

## 2023-10-18 PROCEDURE — 94150 VITAL CAPACITY TEST: CPT

## 2023-10-18 PROCEDURE — 94760 N-INVAS EAR/PLS OXIMETRY 1: CPT

## 2023-10-18 PROCEDURE — 85007 BL SMEAR W/DIFF WBC COUNT: CPT | Performed by: INTERNAL MEDICINE

## 2023-10-18 RX ADMIN — FAMOTIDINE 20 MG: 20 TABLET, FILM COATED ORAL at 09:35

## 2023-10-18 RX ADMIN — ENOXAPARIN SODIUM 40 MG: 40 INJECTION SUBCUTANEOUS at 09:34

## 2023-10-18 RX ADMIN — METOPROLOL TARTRATE 25 MG: 25 TABLET, FILM COATED ORAL at 09:35

## 2023-10-18 RX ADMIN — BUSPIRONE HYDROCHLORIDE 30 MG: 10 TABLET ORAL at 20:39

## 2023-10-18 RX ADMIN — IPRATROPIUM BROMIDE 0.5 MG: 0.5 SOLUTION RESPIRATORY (INHALATION) at 20:01

## 2023-10-18 RX ADMIN — AMLODIPINE BESYLATE 10 MG: 5 TABLET ORAL at 09:34

## 2023-10-18 RX ADMIN — OXYCODONE HYDROCHLORIDE 5 MG: 5 SOLUTION ORAL at 20:39

## 2023-10-18 RX ADMIN — SERTRALINE 75 MG: 25 TABLET, FILM COATED ORAL at 09:31

## 2023-10-18 RX ADMIN — IPRATROPIUM BROMIDE 0.5 MG: 0.5 SOLUTION RESPIRATORY (INHALATION) at 08:01

## 2023-10-18 RX ADMIN — GABAPENTIN 300 MG: 300 CAPSULE ORAL at 09:35

## 2023-10-18 RX ADMIN — QUETIAPINE FUMARATE 50 MG: 25 TABLET ORAL at 20:43

## 2023-10-18 RX ADMIN — BUSPIRONE HYDROCHLORIDE 30 MG: 10 TABLET ORAL at 09:31

## 2023-10-18 RX ADMIN — ACYCLOVIR 400 MG: 200 CAPSULE ORAL at 18:25

## 2023-10-18 RX ADMIN — LEVALBUTEROL HYDROCHLORIDE 1.25 MG: 1.25 SOLUTION RESPIRATORY (INHALATION) at 20:01

## 2023-10-18 RX ADMIN — FLUCONAZOLE 200 MG: 100 TABLET ORAL at 09:35

## 2023-10-18 RX ADMIN — LEVOFLOXACIN 250 MG: 250 TABLET, FILM COATED ORAL at 09:35

## 2023-10-18 RX ADMIN — OXYCODONE HYDROCHLORIDE 5 MG: 5 SOLUTION ORAL at 13:27

## 2023-10-18 RX ADMIN — OXYCODONE HYDROCHLORIDE 5 MG: 5 SOLUTION ORAL at 05:41

## 2023-10-18 RX ADMIN — SULFAMETHOXAZOLE AND TRIMETHOPRIM 1 TABLET: 800; 160 TABLET ORAL at 09:31

## 2023-10-18 RX ADMIN — MELATONIN 3 MG: at 20:43

## 2023-10-18 RX ADMIN — GABAPENTIN 300 MG: 300 CAPSULE ORAL at 20:37

## 2023-10-18 RX ADMIN — LEVALBUTEROL HYDROCHLORIDE 1.25 MG: 1.25 SOLUTION RESPIRATORY (INHALATION) at 13:39

## 2023-10-18 RX ADMIN — METOPROLOL TARTRATE 25 MG: 25 TABLET, FILM COATED ORAL at 20:37

## 2023-10-18 RX ADMIN — FAMOTIDINE 20 MG: 20 TABLET, FILM COATED ORAL at 18:25

## 2023-10-18 RX ADMIN — LEVALBUTEROL HYDROCHLORIDE 1.25 MG: 1.25 SOLUTION RESPIRATORY (INHALATION) at 08:01

## 2023-10-18 RX ADMIN — CHLORHEXIDINE GLUCONATE 15 ML: 1.2 SOLUTION ORAL at 20:37

## 2023-10-18 RX ADMIN — NICOTINE 7 MG: 7 PATCH, EXTENDED RELEASE TRANSDERMAL at 09:32

## 2023-10-18 RX ADMIN — ACYCLOVIR 400 MG: 200 CAPSULE ORAL at 09:31

## 2023-10-18 RX ADMIN — BUSPIRONE HYDROCHLORIDE 30 MG: 10 TABLET ORAL at 19:07

## 2023-10-18 RX ADMIN — GABAPENTIN 300 MG: 300 CAPSULE ORAL at 18:31

## 2023-10-18 RX ADMIN — IPRATROPIUM BROMIDE 0.5 MG: 0.5 SOLUTION RESPIRATORY (INHALATION) at 13:39

## 2023-10-18 RX ADMIN — CHLORHEXIDINE GLUCONATE 15 ML: 1.2 SOLUTION ORAL at 09:35

## 2023-10-18 NOTE — RESPIRATORY THERAPY NOTE
10/18/23 1036   Respiratory Assessment   Resp Comments came in per provider, changed over to trach collar for trial. awaiting posssible nasal cannula trial. pt was junky, but with strong cough and did not want to be suctioned   O2 Device trach collar   Oxygen Therapy/Pulse Ox   O2 Device Trach mask   O2 Therapy Oxygen humidified   FiO2 (%) 40   O2 Flow Rate (L/min) 12 L/min   SpO2 92 %   SpO2 Activity At Rest   $ Pulse Oximetry Spot Check Charge Completed

## 2023-10-18 NOTE — PHYSICAL THERAPY NOTE
Physical Therapy Cancellation Note         10/18/23 1320   Note Type   Note type Cancelled Session   Cancel Reasons Refusal   Additional Comments pt with active PT orders. Pt currently refusing. Stating she has been OOB all day, interested in working with speech therapy. Pt agreeable to PT treatment tomorrow. Will follow up as appropriate.      Kerri Alberto, PT

## 2023-10-18 NOTE — CASE MANAGEMENT
Case Management Progress Note    Patient name Benjy Maloney  Location ICU 03/ICU 03 MRN [id number]  : 1955 Date 10/18/2023       LOS (days): 35  Geometric Mean LOS (GMLOS) (days): 26.10  Days to GMLOS:-8.9        OBJECTIVE:        Current admission status: Inpatient  Preferred Pharmacy:   [facility] #[id number]- [place] - [address] [place] [address],[address]. [address] [facility] [facility] [address]  Phone: [phone] Fax: [address] [address]  Phone: [phone] Fax: [phone]    Primary Care Provider: Kaleigh Dewey MD    Primary Insurance: [place]  Secondary Insurance: [facility]    PROGRESS NOTE:    CM received a VM from pt  Timur Stephens requesitn an update [phone]. CM did return Carly's call and left VM.

## 2023-10-18 NOTE — PROGRESS NOTES
Medical Oncology/Hematology Progress Note  Baudilio Woody, female, 77 y. 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 CT AP with no lymphadenopathy; Brain MRI no lesion; stage II Large B Cell Lymphoma. She is also s/p IR gastrostomy tube placement on 9/27/23. She started her dose-adjusted systemic treatment, R-EPOCH, on 9/22/23 with the last dose on 9/25/23. She received Rituxan infusion on 9/27/23. Her CMP and uric acid have been unremarkable for tumor lysis. CT Soft Tissue Neck (10/16/23) showed improving multi spatial mass centered in the left nasopharynx/oropharynx. Again the mass extends up to the skull base in the infratemporal fossa with loss of perineural fat at the left foramen ovale. Decreased mass effect on the airway. She is on Cycle 2 of EPOCH-R that started on 10/13/23. She was scheduled for Day 6, Cycle 2 of rituximab on 10/17/23, but was held due to PICC line placement issues. She gave permission to continue with treatment for Cytoxan and rituximab. IR consulted for central line placement. Patient continues to be on cuffed trach, respiratory status improving per ICU team.     Assessment and Plan  1. 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 Today (10/17/23) slated for rituximab, immunotherapy. Will hold treatment for today. -S/p Cycle 1 EPOCH (etoposidevincristine, cyclophosphamide, doxorubicin) on 9/25/23, and rituxamab on 9/27/23. Started with growth factor on 9/28/23. Filgrastim (Neupogen) ordered at 5 mg/kg (375 mg) rounded to 300 mcg. Discontinued as of 10/6/23.    2. Left Jugular Lymphadenopathy     3. Leukocytosis   -Improved.  Now back to normal values  -Likely a derivative from growth factor  -WBC 6.42 < 6.29 < 10.02 (10/16) < 15.56 < 17.32 (10/12) < 15.42 (10/10)   - ANC 17.01 (10/14) < 11.26 (10/12) <10.18 <  9.44 < 8.09 < 0.94 < 0.12 < 0.02 < 0.00  -Continues to be afebrile  -Started with growth factor, Neupogen, on 9/28/23, d/c on 10/6/23      4. Acute Thrombocytosis  Platelet trend: 210 < 730  (10/17) <696 (10/16) < 460 (10/14) < 331 (10/13) normal   -Platelets normal on 2640/09. Most likely reactive  -On DVT ppx, Lovenox 40 mg daily    PLAN:  -Plan for IR central line placement  -Plan to proceed with chemo treatment for cyclophosphamide tomorrow and immunotherapy (rituximab) for Friday, 10/20/23. -Will start growth factor, Nivestym, on 10/20/23  -CMP and CBC with differential, uric acid daily.   -Transfuse for hemoglobin <7 g/dL. Interval Hx:  Patient amenable to proceed with chemotherapy. Discussed with patient that ICU team will coordinate with IR in placing a central line. Patent is in very good spirits today. Labs:  CMP (10/18/23)  Sodium 133  Corrected calcium 10.4 < 10.3 < 10.4 < 10.4  Potassium 4.3 < 4.0  < 4.2   Creatinine 0.82 < 0.80  Phosphorus 4.1   Uric acid 4.7 < 4.5 < 4.4    CBC  Hemoglobin 8.9 < 9.4 < 9.3 < 8.4 < 7.6 (10/12) <8.3   Platelets 543 < 125 < 696 < 576 < 460 < 234 (10/12) <182 <167 < 127< 77 (10/4) <61    Imaging:  CT Soft Tissue Neck (10/16/23) - Improving multi spatial mass centered in the left nasopharynx/oropharynx. Again the mass extends up to the skull base in the infratemporal fossa with loss of perineural fat at the left foramen ovale. Decreased mass effect on the airway. No adenopathy by size criteria. Bilateral effusions and extensive airspace disease is stable. 9/30/23 CT PE study Abdomen/pelvis- Hilar and mediastinal lymphadenopathy similar to prior. No acute findings in the abdomen or pelvis. Chronic lytic lesion in T12 vertebral body gradually progressing since 2013 with chronic pathologic fracture.      9/13/23 CTA Chest Rt middle lobe consolidation (PNA), slight regression of previous hilar and mediastinal LNs (had recent PNA in August 2023)      9/14/23 CT Soft Tissue Neck w contrast: soft tissue mass ~ 6.1 x 4.7 x 5.2 cm appears to be centered within Lt oropharynx involving multiple spaces and extends into the nasopharynx. .. multiple small jugular chain nodes on the left. 9/17/23 nasopharynx mass biopsy initial results positive for malignancy favor poorly differentiated carcinoma. Cannot exclude lymphoma. Flow cytometry pending. Please do not hesitate to reach out for questions or concerns. Chris Hamlin, [facility] [address], [address] [facility]  Hematology-Oncology    Chemotherapy HX:  Started growth factor on 9/28/23 with last day on 10/6/23. WBC decreased at 0.21 with absolute neutrophil count at 0.00 on 10/2/23. Commenced with neutropenic precautions. Patient continued to afebrile then, no complaints of bone pain from growth factor injections since 9/28/23. Review of Systems   Constitutional:  Positive for fatigue. Negative for chills, diaphoresis and fever. HENT:  Negative for ear pain and sore throat. Neck mass    Eyes:  Negative for pain and visual disturbance. Respiratory:  Negative for cough, chest tightness, shortness of breath and wheezing. Cardiovascular:  Negative for chest pain and palpitations. Gastrointestinal:  Negative for abdominal pain, blood in stool, diarrhea, nausea and vomiting. Genitourinary:  Negative for difficulty urinating, dysuria and hematuria. Musculoskeletal:  Negative for arthralgias and back pain. Skin:  Negative for color change and rash. Neurological:  Positive for weakness. Negative for dizziness, seizures, syncope and headaches. Psychiatric/Behavioral:  Negative for agitation and decreased concentration. The patient is nervous/anxious. All other systems reviewed and are negative.     Objective:     Medication Administration - last 24 hours from 10/17/2023 0957 to 10/18/2023 0957         Date/Time Order Dose Route Action Action by     10/18/2023 0935 EDT chlorhexidine (PERIDEX) 0.12 % oral rinse 15 mL 15 mL Mouth/Throat Given Hans Jaimesa, RN     10/17/2023 2125 EDT chlorhexidine (PERIDEX) 0.12 % oral rinse 15 mL 15 mL Mouth/Throat Given Saleem Stocktonrosalio, RN     10/18/2023 3038 EDT busPIRone (BUSPAR) tablet 30 mg 30 mg Oral Given Hans Sella, RN     10/17/2023 2125 EDT busPIRone (BUSPAR) tablet 30 mg 30 mg Oral Given Saleem Cifuentes, RN     10/17/2023 1659 EDT busPIRone (BUSPAR) tablet 30 mg 30 mg Oral Given Zainab Logandotommyo, RN     10/18/2023 0935 EDT gabapentin (NEURONTIN) capsule 300 mg 300 mg Oral Given Hans Jaimesa, RN     10/17/2023 2125 EDT gabapentin (NEURONTIN) capsule 300 mg 300 mg Oral Given Saleem Stocktonrosalio, RN     10/17/2023 1700 EDT gabapentin (NEURONTIN) capsule 300 mg 300 mg Oral Given Zainab Fordaarono, RN     10/17/2023 2131 EDT senna oral syrup 8.8 mg 8.8 mg Per NG Tube Not Given Saleem Stocktonrosalio, RN     10/18/2023 0935 EDT famotidine (PEPCID) tablet 20 mg 20 mg Oral Given Hans Jaimesa, RN     10/17/2023 1700 EDT famotidine (PEPCID) tablet 20 mg 20 mg Oral Given Zainab Logandoardo, RN     10/18/2023 0931 EDT sertraline (ZOLOFT) tablet 75 mg 75 mg Per PEG Tube Given Hans Sella, RN     10/17/2023 2125 EDT QUEtiapine (SEROquel) tablet 50 mg 50 mg Oral Given Saleem Stocktonrosalio, RN     10/18/2023 0932 EDT nicotine (NICODERM CQ) 7 mg/24hr TD 24 hr patch 7 mg 7 mg Transdermal Medication Applied Hans Rodriguez, RN     10/18/2023 0930 EDT nicotine (NICODERM CQ) 7 mg/24hr TD 24 hr patch 7 mg 7 mg Transdermal Patch Removed Hans Rodriguez RN     10/18/2023 0934 EDT enoxaparin (LOVENOX) subcutaneous injection 40 mg 40 mg Subcutaneous Given Hans Rodriguez RN     10/18/2023 0801 EDT levalbuterol Peggy Bonner) inhalation solution 1.25 mg 1.25 mg Nebulization Given ARVIND GARCIA     10/17/2023 1959 EDT levalbuterol (XOPENEX) inhalation solution 1.25 mg 1.25 mg Nebulization Given Goyo Wilder Eliel Dominik, RT     10/17/2023 1323 EDT levalbuterol (XOPENEX) inhalation solution 1.25 mg 1.25 mg Nebulization Given Criselda Kwanevert, RT     10/18/2023 0801 EDT ipratropium (ATROVENT) 0.02 % inhalation solution 0.5 mg 0.5 mg Nebulization Given RADHAARVIND     10/17/2023 1959 EDT ipratropium (ATROVENT) 0.02 % inhalation solution 0.5 mg 0.5 mg Nebulization Given Hermelindo Herrera, RT     10/17/2023 1323 EDT ipratropium (ATROVENT) 0.02 % inhalation solution 0.5 mg 0.5 mg Nebulization Given Criseldaana Kwanevert, RT     10/18/2023 0541 EDT oxyCODONE (ROXICODONE) oral solution 5 mg 5 mg Oral Given Raven Mcgregor, RN     10/17/2023 2125 EDT oxyCODONE (ROXICODONE) oral solution 5 mg 5 mg Oral Given Raven Mcgregor, RN     10/17/2023 1240 EDT oxyCODONE (ROXICODONE) oral solution 5 mg 5 mg Oral Given Zainab Laureano, RN     10/18/2023 0935 EDT levofloxacin (LEVAQUIN) tablet 250 mg 250 mg Oral Given Semaj Grimaldo, RN     10/17/2023 1008 EDT levofloxacin (LEVAQUIN) tablet 250 mg 250 mg Oral Given Zainab Laureano, RN     10/18/2023 0935 EDT fluconazole (DIFLUCAN) tablet 200 mg 200 mg Oral Given Semaj Grimaldo, RN     10/18/2023 0931 EDT acyclovir (ZOVIRAX) capsule 400 mg 400 mg Oral Given Semaj Grimaldo, RN     10/17/2023 1701 EDT acyclovir (ZOVIRAX) capsule 400 mg 400 mg Oral Given Zainab Laureano, RN     10/17/2023 1008 EDT acyclovir (ZOVIRAX) capsule 400 mg 400 mg Oral Given Zainab Laureano, RN     10/18/2023 0931 EDT sulfamethoxazole-trimethoprim (BACTRIM DS) 800-160 mg per tablet 1 tablet 1 tablet Oral Given Semajjohnson Grimaldo RN     10/17/2023 1700 EDT predniSONE tablet 100 mg 100 mg Oral Given Zainab Laureano, TINO     10/17/2023 1008 EDT predniSONE tablet 100 mg 100 mg Oral Given Zainab Laureano, TINO     10/17/2023 1110 EDT DOXOrubicin (ADRIAMYCIN) 18 mg, etoposide (TOPOSAR) 67.6 mg, vinCRIStine (ONCOVIN) 0.7 mg in sodium chloride 0.9 % 500 mL infusion -- Intravenous Stopped Shefali Norris RN     10/18/2023 0934 EDT amLODIPine (NORVASC) tablet 10 mg 10 mg Oral Given Coral Recinos, TINO     10/18/2023 0935 EDT metoprolol tartrate (LOPRESSOR) tablet 25 mg 25 mg Oral Given Coral Recinos, TINO     10/17/2023 2125 EDT metoprolol tartrate (LOPRESSOR) tablet 25 mg 25 mg Oral Given Denise Amos RN     10/17/2023 1008 EDT metoprolol tartrate (LOPRESSOR) tablet 25 mg 25 mg Oral Given Zainab Laureano, TINO     10/18/2023 3579 EDT sodium chloride 0.9 % bolus 2,000 mL 2,000 mL Intravenous Not Given Coral Recinos, TINO     10/17/2023 2125 EDT melatonin tablet 3 mg 3 mg Oral Given Denise Amos RN            /83   Pulse 77   Temp 98.7 °F (37.1 °C) (Oral)   Resp (!) 9   Ht 5' 0.98" (1.549 m)   Wt 80.4 kg (177 lb 4 oz)   SpO2 91%   BMI 33.51 kg/m²       Physical Exam  Constitutional:       Appearance: She is not ill-appearing. HENT:      Head: Normocephalic and atraumatic. Comments: Trach cuff in place     Right Ear: External ear normal.      Left Ear: External ear normal.      Nose: No congestion or rhinorrhea. Mouth/Throat:      Mouth: Mucous membranes are moist.   Eyes:      Extraocular Movements: Extraocular movements intact. Conjunctiva/sclera: Conjunctivae normal.      Pupils: Pupils are equal, round, and reactive to light. Neck:      Comments: cuffed Federico XLT #7 on 10/3/23  Cardiovascular:      Rate and Rhythm: Regular rhythm. Tachycardia present. Pulses: Normal pulses. Heart sounds: No murmur heard. No gallop. Pulmonary:      Effort: Pulmonary effort is normal. No respiratory distress. Breath sounds: No wheezing, rhonchi or rales. Comments: Vented  Abdominal:      General: Bowel sounds are normal. There is no distension. Palpations: There is no mass. Tenderness: There is no abdominal tenderness. There is no guarding. Comments: Peg tube in place, on tube feeding   Musculoskeletal:      Right lower leg: No edema. Left lower leg: No edema. Skin:     General: Skin is warm. Capillary Refill: Capillary refill takes less than 2 seconds. Coloration: Skin is not jaundiced or pale. Findings: No rash. Neurological:      General: No focal deficit present. Mental Status: She is alert and oriented to person, place, and time. Psychiatric:         Behavior: Behavior normal.         Thought Content:  Thought content normal.         Judgment: Judgment normal.      Comments: In good spirits         Recent Results (from the past 48 hour(s))   Magnesium    Collection Time: 10/17/23  5:39 AM   Result Value Ref Range    Magnesium 2.1 1.9 - 2.7 mg/dL   Comprehensive metabolic panel    Collection Time: 10/17/23  5:39 AM   Result Value Ref Range    Sodium 137 135 - 147 mmol/L    Potassium 4.0 3.5 - 5.3 mmol/L    Chloride 99 96 - 108 mmol/L    CO2 31 21 - 32 mmol/L    ANION GAP 7 mmol/L    BUN 39 (H) 5 - 25 mg/dL    Creatinine 0.82 0.60 - 1.30 mg/dL    Glucose 153 (H) 65 - 140 mg/dL    Calcium 9.6 8.4 - 10.2 mg/dL    Corrected Calcium 10.3 (H) 8.3 - 10.1 mg/dL    AST 24 13 - 39 U/L    ALT 37 7 - 52 U/L    Alkaline Phosphatase 113 (H) 34 - 104 U/L    Total Protein 5.9 (L) 6.4 - 8.4 g/dL    Albumin 3.1 (L) 3.5 - 5.0 g/dL    Total Bilirubin 0.34 0.20 - 1.00 mg/dL    eGFR 72 ml/min/1.73sq m   CBC and differential    Collection Time: 10/17/23  5:39 AM   Result Value Ref Range    WBC 6.29 4.31 - 10.16 Thousand/uL    RBC 3.10 (L) 3.81 - 5.12 Million/uL    Hemoglobin 9.4 (L) 11.5 - 15.4 g/dL    Hematocrit 29.2 (L) 34.8 - 46.1 %    MCV 94 82 - 98 fL    MCH 30.3 26.8 - 34.3 pg    MCHC 32.2 31.4 - 37.4 g/dL    RDW 14.9 11.6 - 15.1 %    MPV 9.8 8.9 - 12.7 fL    Platelets 488 (H) 374 - 390 Thousands/uL   Uric acid    Collection Time: 10/17/23  5:39 AM   Result Value Ref Range    Uric Acid 4.5 2.0 - 7.5 mg/dL   LD,Blood    Collection Time: 10/17/23  5:39 AM   Result Value Ref Range     140 - 271 U/L   Manual Differential(PHLEBS Do Not Order)    Collection Time: 10/17/23  5:39 AM   Result Value Ref Range    Segmented % 94 (H) 43 - 75 %    Lymphocytes % 2 (L) 14 - 44 %    Monocytes % 4 4 - 12 %    Eosinophils, % 0 0 - 6 %    Basophils % 0 0 - 1 %    Absolute Neutrophils 5.91 1.85 - 7.62 Thousand/uL    Lymphocytes Absolute 0.13 (L) 0.60 - 4.47 Thousand/uL    Monocytes Absolute 0.25 0.00 - 1.22 Thousand/uL    Eosinophils Absolute 0.00 0.00 - 0.40 Thousand/uL    Basophils Absolute 0.00 0.00 - 0.10 Thousand/uL    Total Counted      RBC Morphology Present     Platelet Estimate Increased (A) Adequate    Anisocytosis Present    Comprehensive metabolic panel    Collection Time: 10/18/23  5:42 AM   Result Value Ref Range    Sodium 133 (L) 135 - 147 mmol/L    Potassium 4.3 3.5 - 5.3 mmol/L    Chloride 96 96 - 108 mmol/L    CO2 29 21 - 32 mmol/L    ANION GAP 8 mmol/L    BUN 49 (H) 5 - 25 mg/dL    Creatinine 0.89 0.60 - 1.30 mg/dL    Glucose 135 65 - 140 mg/dL    Calcium 9.6 8.4 - 10.2 mg/dL    Corrected Calcium 10.4 (H) 8.3 - 10.1 mg/dL    AST 19 13 - 39 U/L    ALT 34 7 - 52 U/L    Alkaline Phosphatase 102 34 - 104 U/L    Total Protein 5.6 (L) 6.4 - 8.4 g/dL    Albumin 3.0 (L) 3.5 - 5.0 g/dL    Total Bilirubin 0.35 0.20 - 1.00 mg/dL    eGFR 65 ml/min/1.73sq m   CBC and differential    Collection Time: 10/18/23  5:42 AM   Result Value Ref Range    WBC 6.42 4.31 - 10.16 Thousand/uL    RBC 3.00 (L) 3.81 - 5.12 Million/uL    Hemoglobin 8.9 (L) 11.5 - 15.4 g/dL    Hematocrit 27.8 (L) 34.8 - 46.1 %    MCV 93 82 - 98 fL    MCH 29.7 26.8 - 34.3 pg    MCHC 32.0 31.4 - 37.4 g/dL    RDW 14.6 11.6 - 15.1 %    MPV 9.6 8.9 - 12.7 fL    Platelets 699 (H) 210 - 390 Thousands/uL   Uric acid    Collection Time: 10/18/23  5:42 AM   Result Value Ref Range    Uric Acid 4.7 2.0 - 7.5 mg/dL   LD,Blood    Collection Time: 10/18/23  5:42 AM   Result Value Ref Range     140 - 271 U/L   Manual Differential(PHLEBS Do Not Order)    Collection Time: 10/18/23  5:42 AM   Result Value Ref Range    Segmented % 94 (H) 43 - 75 %    Lymphocytes % 5 (L) 14 - 44 %    Monocytes % 1 (L) 4 - 12 %    Eosinophils, % 0 0 - 6 %    Basophils % 0 0 - 1 %    Absolute Neutrophils 6.03 1.85 - 7.62 Thousand/uL    Lymphocytes Absolute 0.32 (L) 0.60 - 4.47 Thousand/uL    Monocytes Absolute 0.06 0.00 - 1.22 Thousand/uL    Eosinophils Absolute 0.00 0.00 - 0.40 Thousand/uL    Basophils Absolute 0.00 0.00 - 0.10 Thousand/uL    Total Counted      RBC Morphology Present     Platelet Estimate Increased (A) Adequate    Anisocytosis Present        US right upper quadrant    Result Date: 9/22/2023  Narrative: RIGHT UPPER QUADRANT ULTRASOUND INDICATION:     CT finding N/V +Cancino. COMPARISON: CT abdomen/pelvis 9/21/2023 TECHNIQUE:   Real-time ultrasound of the right upper quadrant was performed with a curvilinear transducer with both volumetric sweeps and still imaging techniques. FINDINGS: Evaluation limited as per sonographer's note in PACS. PANCREAS:  Visualized portions of the pancreas are within normal limits. AORTA AND IVC:  Visualized portions are normal for patient age. LIVER: Size:  Within normal range. The liver measures 15.3 cm in the midclavicular line. Contour:  Surface contour is smooth. Parenchyma:  Echogenicity and echotexture are within normal limits. No liver mass identified. Limited imaging of the main portal vein shows it to be patent and hepatopetal. BILIARY: The gallbladder is normal in caliber. Gallbladder wall thickness upper limits of normal. No pericholecystic fluid. There is gallbladder sludge without evidence for shadowing calculi. No sonographic Cancino sign. No intrahepatic biliary dilatation. CBD measures 4.0 mm. No choledocholithiasis. KIDNEY: Right kidney measures 11.9 x 5.2 x 6.4 cm. Volume 207.6 mL Several simple cysts, the largest of which measures 4.1 cm. 2.8 cm septated cyst in the upper pole. No hydronephrosis or perinephric collection. ASCITES:   None. Impression: No cholelithiasis.  Gallbladder sludge is present with wall thickness upper limits of normal. Negative sonographic Cancino sign. Findings may be sequela of chronic gallbladder dysfunction. Consider follow-up with a HIDA scan if it would alter patient management. Workstation performed: [id number]     CT abdomen pelvis w contrast    Result Date: 9/21/2023  Narrative: CT ABDOMEN AND PELVIS WITH IV CONTRAST INDICATION:   Head/neck cancer, staging lymohoma staging prior to treatment with chemo. COMPARISON: 9/13/2023. TECHNIQUE:  CT examination of the abdomen and pelvis was performed. Multiplanar 2D reformatted images were created from the source data. This examination, like all CT scans performed in the [facility], was performed utilizing techniques to minimize radiation dose exposure, including the use of iterative reconstruction and automated exposure control. Radiation dose length product (DLP) for this visit:  813 mGy-cm IV Contrast:  100 mL of iohexol (OMNIPAQUE) Enteric Contrast:  Enteric contrast was not administered. FINDINGS: Mildly degraded by respiratory motion. ABDOMEN LOWER CHEST: There is bibasilar atelectasis. LIVER/BILIARY TREE:  Unremarkable. GALLBLADDER:  No calcified gallstones. There may be mild gallbladder wall thickening though evaluation is degraded by respiratory motion. No surrounding inflammatory changes. SPLEEN:  Unremarkable. PANCREAS:  Unremarkable. ADRENAL GLANDS:  Unremarkable. KIDNEYS/URETERS: Multiple bilateral renal cysts. No hydronephrosis. STOMACH AND BOWEL: There is colonic diverticulosis without evidence of acute diverticulitis. APPENDIX:  No findings to suggest appendicitis. ABDOMINOPELVIC CAVITY:  No ascites. No pneumoperitoneum. No lymphadenopathy. VESSELS:  Unremarkable for patient's age. PELVIS REPRODUCTIVE ORGANS:  Unremarkable for patient's age. URINARY BLADDER:  Unremarkable. ABDOMINAL WALL/INGUINAL REGIONS:  Fat containing right inguinal hernia noted. Fat-containing umbilical hernia.  OSSEOUS STRUCTURES: Again seen is a destructive lytic lesion in the T12 vertebral body with sclerotic borders, progressed from 2013 and with chronic appearing pathologic fracture. Impression: 1. No abdominal lymphadenopathy. 2.  Question gallbladder wall thickening versus motion artifact. If there is clinical concern for gallbladder pathology, ultrasound is recommended. 3.  Chronic T12 lytic lesion with pathologic fracture. Workstation performed: [id number]     MRI soft tissue neck wo and w contrast    Result Date: 9/21/2023  Narrative: MRI OF THE SOFT TISSUES OF THE NECK - WITH AND WITHOUT CONTRAST INDICATION: Oropharyngeal mass s/p biopsy (+) for carcinoma COMPARISON: Neck CT from 9/14/2023 TECHNIQUE:  Multiplanar, multisequence imaging of the soft tissues of the neck was performed before and after gadolinium administration. . IV Contrast:  7.5 mL of Gadobutrol injection (SINGLE-DOSE) IMAGE QUALITY: Motion degrades images. FINDINGS: VISUALIZED BRAIN PARENCHYMA: No acute or suspicious findings. Please see today's brain MR report. VISUALIZED ORBITS AND PARANASAL SINUSES: Globes and orbits are within normal limits. Pan paranasal sinus mucosal thickening, greatest involving ethmoids and sphenoids. NASAL CAVITY, NASOPHARYNX, and OROHYPOPHARYNX and LARYNX: Approximately 7.5 x 4.0 x 7.8 cm (length X depth X width) soft tissue mass, epicenter likely the left lateral pharyngeal recess. Enhances and restricts diffusion. Central, irregular fluid signal intensity area without enhancement appears contiguous with fluid around an endotracheal tube, likely trapped secretions and circumferential mass. Mass extends from the left greater than right nasopharynx superiorly to the cricoid cartilage inferiorly. Extends to the posterior nasal cavity. Displaces the soft palate, uvula and tongue base anteriorly. Oral cavity is otherwise within normal limits. Displaces the left pterygoid muscles anterolaterally.  Extends posterior to the angle of the mandible approximately 1.4 cm, abutting and mildly laterally displacing the deep parotid, inseparable from the gland. Effaces the left parapharyngeal fat. Discrete epiglottis not seen, grossly anteriorly displaced. Extends along the left aryepiglottic fold and posterior pharyngeal wall to the to the inferior supraglottic airway, grossly terminating just above or at the left false cord. Extends to the left carotid space, encircles the carotid with severe narrowing and posterior-lateral displacement of the distal cervical and most proximal petrous vessel. Otherwise preserved left internal carotid flow-void. Posterior-lateral displacement  and occlusion of the internal jugular vein at the level of the angle of the mandible in the distal neck. Mass extends to the proximal jugular foramen. Laryngeal ventricles and true cords appear preserved. Normal fat signal  preserved in the cricoid and thyroid cartilages. Enteric and endotracheal tubes are displaced rightward. Trace retropharyngeal effusion. THYROID GLAND:   Within normal limits. PAROTID AND SUBMANDIBULAR GLANDS: Both submandibular and the right parotid glands are within normal limits. Deep left parotid involvement mentioned above. LYMPH NODES: Similar small jugular chain nodes more numerous on the left than the right, but none considered pathologically enlarged. 0.7 x 0.5 cm suspicious right retropharyngeal node (4/27). Left retropharyngeal space is obliterated by mass, no separate adenopathy. VASCULAR STRUCTURES: Left carotid a jugular involvement described above. Right carotid artery and jugular veins are within normal limits. THORACIC INLET: Similar to CT. Dependent lung opacities. CERVICAL SPINE: Normal appearance. OTHER: Left greater than right mastoid effusions with patchy left enhancement and restricted diffusion were described in today's brain MR report. Asymmetric opacity involves the left petrous apex. Normal appearance of the IACs and inner ear structures.  No cerebellopontine angle mass.     Impression: Known, large left neck mass described in detail above. Workstation performed: [id number]     MRI brain w wo contrast    Result Date: 9/21/2023  Narrative: MRI BRAIN WITHOUT AND WITH CONTRAST INDICATION:  Oropharyngeal mass s/p biopsy (+) for carcinoma . COMPARISON: 6/17/2017 CT of the brain TECHNIQUE:  Multiplanar/multisequence MRI of the brain was performed administration of contrast. IV Contrast:  7.5 mL of Gadobutrol injection (SINGLE-DOSE) IMAGE QUALITY:  Diagnostic. FINDINGS: BRAIN PARENCHYMA: No  hemorrhage, extra-axial fluid collection, acute infarct, mass effect or midline shift. Mild, focal patchy periventricular and subcortical white matter foci of abnormal T2 and FLAIR hyperintensity are nonspecific, but usually secondary to changes of microangiopathy. Small developmental venous angioma in the left posterior frontal lobe, typically clinically insignificant. No suspicious enhancement or masses. VENTRICLES:  Within normal limits for age. SELLA AND PITUITARY GLAND:  Within normal limits. ORBITS:  Within normal limits. PARANASAL SINUSES: Mucosal thickening. VASCULATURE: Preserved skull base flow voids. Normal enhancement. CALVARIUM AND SKULL BASE: Bilateral mastoid effusions are likely secondary mass, related to eustachian tube obstruction from nasopharyngeal mass. Patient is also intubated. Small mucosal retention cyst at the right fossa of Rosenmuller. Small ill-defined foci of enhancement in the superior mastoid and asymmetric left mastoid restricted diffusion. No coalescence or discrete mass. Skull and visualized cervical spine are within normal limits. EXTRACRANIAL SOFT TISSUES:  A nasopharyngeal mass will be described in further detail on today's neck MR report. Impression: No evidence of brain metastases. 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  Asymmetric patchy enhancement and restricted diffusion in the left mastoid. The differential includes neoplastic involvement and infection. Workstation performed: [id number]     XR chest portable    Result Date: 9/20/2023  Narrative: CHEST INDICATION:   NG tube placement. COMPARISON: 9/17/2023 EXAM PERFORMED/VIEWS:  XR CHEST PORTABLE FINDINGS: Tracheostomy tube is in stable position. Distal portions of nasogastric tube are seen below the hemidiaphragm within the left upper abdomen. The tip of the tube extends beyond the inferior margin of this study. Heart shadow is enlarged but unchanged from prior exam. There is mild pulmonary vascular congestion. The left costophrenic angle is obscured. Hazy opacities are additionally noted within the right costophrenic angle. No evidence of acute osseous abnormality. Impression: 1. Nasogastric tube is seen with its distal portions within the stomach. The tip of the tube courses beyond the inferior margin of the study. 2. Pulmonary vascular congestion with obscuration of the left hemidiaphragm. This is stable from prior radiograph and may represent small left effusion versus consolidation. 3. Right basilar atelectasis versus trace right effusion. Workstation performed: [id number]     XR chest portable    Result Date: 9/18/2023  Narrative: CHEST INDICATION:   Assess. COMPARISON:  None EXAM PERFORMED/VIEWS:  XR CHEST PORTABLE Images: 2 FINDINGS: Tracheostomy tube is seen in appropriate position. A feeding tube is seen extending down below the dome of the diaphragm Cardiomegaly seen Increased lung markings seen suggest congestion left base is obscured Osseous structures appear within normal limits for patient age. Impression: Increased lung markings seen suggest congestion. The left base is obscured No worsening Workstation performed: [id number]     CT soft tissue neck w contrast    Result Date: 9/14/2023  Narrative: CT NECK WITH CONTRAST INDICATION:   Laryngeal edema COMPARISON:  None.  TECHNIQUE:  Axial, sagittal, and coronal 2D reformatted images were created from the axial source data and submitted for interpretation. Radiation dose length product (DLP) for this visit:  769 mGy-cm . This examination, like all CT scans performed in the [facility], was performed utilizing techniques to minimize radiation dose exposure, including the use of iterative reconstruction and automated exposure control. IV Contrast:  85 mL of iohexol (OMNIPAQUE) IMAGE QUALITY:  Diagnostic. FINDINGS: VISUALIZED BRAIN PARENCHYMA:  No acute intracranial pathology of the visualized brain parenchyma. VISUALIZED ORBITS: Normal visualized orbits. PARANASAL SINUSES: Normal visualized paranasal sinuses. Nasopharynx, oropharynx AND HYPOPHARYNX: There is a large heterogeneously enhancing mass identified within the neck involving multiple spaces. The origin is difficult to ascertain given the large size. This mass measures approximately 6.1 x 4.7 x 5.2 cm and appears to be centered within the left oropharynx. The mass extends superiorly into the nasopharynx left more so than right and into the posterior aspect of the nasal cavity. The mass also extends across the midline within the oropharynx and inferiorly into the left lateral oropharynx but not below the laryngeal ventricle. The mass extends laterally into the infratemporal fossa and parapharyngeal fat and is inseparable from infratemporal musculature and deep lobe of the parotid gland. There is extension into the prevertebral space where the mass is inseparable from the anterior paraspinal muscle on the left and posteriorly and laterally into the carotid space where the mass is displacing and inseparable from jugular and carotid. The mass encases the cervical internal carotid artery just below the level of the skull base resulting in narrowing of the vessel and the mass compresses and occludes the jugular vein below the skull base.  The vessel does reconstitute via collateral vessels as it  extends inferiorly within the neck. The mass extends superiorly into the skull base inseparable from the carotid canal and jugular foramen. There is severe airway compromise within the posterior oral cavity and superior oropharynx. ET tube and OG tube are present. THYROID GLAND:  Unremarkable. PAROTID AND SUBMANDIBULAR GLANDS: Normal parotid and submandibular glands other than the mass abutting the deep lobe of the left parotid gland. LYMPH NODES: Multiple small jugular chain nodes are seen on the left. VASCULAR STRUCTURES: As described above the mass partially encases the cervical internal carotid artery, narrowing the vessel and occludes the jugular vein from the skull base to the mid neck. Below this the vessel is unremarkable due to collateral reconstitution. THORACIC INLET: Posterior left upper lobe consolidation may represent atelectasis or pneumonia. Mild patchy groundglass opacity within the upper lung fields. BONY STRUCTURES: At T1-2 there is a left paramedian bridging osteophyte resulting in mild to moderate left anterior lateral canal stenosis. Impression: Large enhancing soft tissue mass identified within the left neck involving multiple spaces. This appears to be centered within the left oropharynx with extensions as described above into multiple spaces. This results in significant airway compromise. 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 Small level 3 and level 4 nodes are seen within the neck. I personally discussed this study with ICU resident on 9/14/2023 4:44 PM. Workstation performed: [id number]     Bronchoscopy    Result Date: 9/14/2023  Narrative: Table formatting from the original result was not included.  [address] [place] [address] [phone] DATE OF SERVICE: 9/14/23 PHYSICIAN(S): Attending: No Staff Documented Fellow: No Staff Documented INDICATION: Acute respiratory failure with hypoxia ([address]) POST-OP DIAGNOSIS: See the impression below. PREPROCEDURE: Standard airway preparation completed per respiratory therapy protocol. Informed consent was obtained. Images reviewed prior to the procedure. A Time Out was performed. No suspicion or identified risk for TB or other airborne infectious disease; bronchoscopy procedure being performed for diagnostic purposes. PROCEDURE: Bronchoscopy DETAILS OF PROCEDURE: Patient was taken to the procedure room where a time out was performed to confirm correct patient and correct procedure. The patient was already under sedation prior to the procedure. The patient's blood pressure, ECG, heart rate, level of consciousness, respirations and oxygen were monitored throughout the procedure. The patient experienced no blood loss. The scope was introduced through the endotracheal tube. The procedure was moderately difficult due to patient intolerance and persistent coughing. In response to procedure difficulty, deeper sedation was employed. The patient tolerated the procedure well. There were no apparent adverse events. ANESTHESIA INFORMATION: ASA: ASA status not filed in the log. Anesthesia Type: Anesthesia type not filed in the log.  FINDINGS: The lower trachea, main sujata, left main stem, KARTHIK, lingula, LLL, right main stem, RUL, bronchus intermedius, RML and RLL appeared normal. Left lower lung zone with no endobronchial lesions, left upper and lingula both appear normal Right upper, right middle and right lower lobes all appeared normal with no endobronchial lesions Endotracheal tube was retracted 3 cm under direct visualization with the bronchoscope Bronchoalveolar lavage was performed x1 in the right lateral segment (RB4) with 60 mL of saline instilled and a total return of 40 mL; the fluid appeared cloudy SPECIMENS: ID Type Source Tests Collected by Time Destination 1 :  Lavage Lung, Right Middle Lobe Bronchoalveolar Lavage PULMONARY CYTOLOGY Angeles Foley MD 9/14/2023 12:55 PM  A :  Bronchial Lung, Right Middle Lobe Bronchoalveolar Lavage FUNGAL CULTURE, VIRUS CULTURE, BRONCHIAL CULTURE AND GRAM STAIN, LEGIONELLA CULTURE, PNEUMOCYSTIS SMEAR BY DFA ([facility]), AFB CULTURE WITH STAIN Chelsy Mcclure MD 9/14/2023 12:57 PM       Impression: BAL of the right middle lobe Endotracheal tube was retracted under direct visualization Tongue still appears swollen, vocal cords visualized outside of the endotracheal tube with the bronchoscope, no significant edema or mass noted RECOMMENDATION:  Follow-up infectious work-up      XR chest 1 view portable    Result Date: 9/13/2023  Narrative: CHEST INDICATION:   post intubation. COMPARISON: Same day chest radiograph and subsequent same day CT chest. Chest x-ray 8/27/2023. EXAM PERFORMED/VIEWS:  XR CHEST PORTABLE FINDINGS: Endotracheal tube terminates approximately 4 cm above the sujata. Heart shadow is enlarged but unchanged from prior exam. Bibasilar airspace opacities are again demonstrated. No appreciable pneumothorax. No evidence of acute osseous abnormality. Lucent lesion within T12 is better demonstrated on same-day CT. Impression: 1. Endotracheal tube terminates 4 cm above the sujata. 2. Redemonstration of bibasilar airspace opacities. Workstation performed: [id number]     XR chest 1 view portable    Result Date: 9/13/2023  Narrative: CHEST INDICATION:   post intubation, adjusting ET Tube. COMPARISON:  None EXAM PERFORMED/VIEWS:  XR CHEST PORTABLE FINDINGS: Endotracheal tube terminates approximately 3 cm above the sujata. Cardiomediastinal silhouette appears unremarkable. Bibasilar airspace opacities are again noted. No appreciable pneumothorax or pleural effusion. No evidence of acute osseous abnormality. Lucent lesion within T12 is better demonstrated on same-day CT. Impression: 1. Endotracheal tube terminates 3.3 cm above the sujata. 2. Bibasilar airspace opacities are again demonstrated.  Workstation performed: [id number]     XR chest portable    Result Date: 9/13/2023  Narrative: CHEST INDICATION:   sob. COMPARISON: 8/27/2023. Subsequent CT chest performed the same day. EXAM PERFORMED/VIEWS:  XR CHEST PORTABLE FINDINGS: Heart shadow is enlarged but unchanged from prior exam. Hazy opacities are noted within the right lung base, possibly atelectasis versus pneumonia. Left basilar opacity is similar to prior radiograph. No appreciable pneumothorax. No evidence of acute osseous abnormality. Cystic lesion within the T12 vertebral body is better characterized on same-day CT. Impression: 1. Hazy bibasilar airspace opacities which may represent atelectasis versus pneumonia. Left basilar opacity is similar to recent radiograph while right basilar opacity is new Workstation performed: [id number]     CTA ED chest PE Study    Result Date: 9/13/2023  Narrative: CTA - CHEST WITH IV CONTRAST - PULMONARY ANGIOGRAM INDICATION:   R/O PE. Reports shortness of breath since being discharged from the hospital 3 weeks ago for pneumonia. Fatigue. Productive cough with white sputum. Angioedema. COMPARISON: Chest radiograph 9/13/2023, chest CT 8/25/2023, thoracic spine CT 11/13/2013. TECHNIQUE: CT angiogram timed for optimal opacification of the pulmonary arteries. Axial, sagittal, and coronal 2D reformats created from source data. Coronal 3D MIP postprocessing on the acquisition scanner. Radiation dose length product (DLP):  472 mGy-cm . Radiation dose exposure minimized using iterative reconstruction and automated exposure control. IV Contrast:  85 mL of iohexol (OMNIPAQUE) FINDINGS: PULMONARY ARTERIES:  No pulmonary embolus. Moderate pulmonary artery enlargement. LUNGS: Mild patchy new consolidation in the medial segment right middle lobe. Improving opacity in the left upper lobe with mild residual atelectasis/scar. Redemonstration of mild dependent atelectasis in both lower lobes. Severe emphysema. AIRWAYS: No significant filling defects. ET tube 4 cm above the sujata.  PLEURA: Unremarkable. HEART/GREAT VESSELS: Moderate cardiomegaly. MEDIASTINUM AND PRASANTH: Slight regression of hilar and mediastinal lymphadenopathy. The largest node was previously 2.5 x 2.0 cm in the right infrahilar region and is now 1.9 x 1.3 cm (501/94). CHEST WALL AND LOWER NECK: Unremarkable. UPPER ABDOMEN: Right renal cysts. OSSEOUS STRUCTURES: Redemonstration of the large cystic lesion with sclerotic margins in T12 with destruction of the superior and inferior endplates, present as a much smaller thin-walled cystic lesion on the thoracic spine CT from 2013, imaged on a thoracic spine MRI from 2020. Impression: No pulmonary embolus. Patchy consolidation right middle lobe, new from 8/25/2023, compatible with pneumonia. Slight regression of previous hilar and mediastinal lymphadenopathy. Improving left upper lobe opacity which may have been due to pneumonia with residual atelectasis/scar. Persistent partial atelectasis of the lower lobes. 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 Workstation performed: [id number]     Echo complete w/ contrast if indicated    Result Date: 8/28/2023  Narrative:   Left Ventricle: Left ventricular cavity size is small. Wall thickness is increased. There is severe concentric hypertrophy. The left ventricular ejection fraction is 60%. Systolic function is normal. Wall motion is normal. Diastolic function is mildly abnormal, consistent with grade I (abnormal) relaxation. There is no LV dynamic obstruction at rest.   Right Ventricle: Right ventricular cavity size is normal. Systolic function is normal.   Left Atrium: The atrium is mildly dilated. Mitral Valve: There is mild to moderate regurgitation. There is systolic anterior motion of the chordal apparatus with late peaking gradient 29 mmHg during Valsalva maneuver. Pericardium: There is a trivial pericardial effusion along the right atrial free wall. XR chest portable ICU    Result Date: 8/28/2023  Narrative: CHEST INDICATION:   Acute hypoxic respiratory failure. COMPARISON: 8/25/2023 EXAM PERFORMED/VIEWS:  XR CHEST PORTABLE ICU FINDINGS: Heart shadow is enlarged but unchanged from prior exam. There is stable left-sided volume loss secondary to left basilar atelectasis. No pneumothorax or pleural effusion. Osseous structures appear within normal limits for patient age. Impression: Stable volume loss on the left secondary to left lower lobe atelectasis. Workstation performed: [id number]     VAS lower limb venous duplex study, complete bilateral    Result Date: 8/27/2023  Narrative:  [facility] REPORT CLINICAL: Indications: Patient presents with bilateral lower extremity pain x 1 days. Operative History: cardiac surgery-date unknown. Risk Factors The patient has history of HTN and CKD. She has no history of Hyperlipidemia. CONCLUSION:  Impression: RIGHT LOWER LIMB: No evidence of acute or chronic deep vein thrombosis. No evidence of superficial thrombophlebitis noted. Doppler evaluation shows a normal response to augmentation maneuvers. . Popliteal, posterior tibial and anterior tibial arterial Doppler waveform's are triphasic. LEFT LOWER LIMB: No evidence of acute or chronic deep vein thrombosis. No evidence of superficial thrombophlebitis noted. Doppler evaluation shows a normal response to augmentation maneuvers. Popliteal, posterior tibial and anterior tibial arterial Doppler waveform's are triphasic. SIGNATURE: Electronically Signed by: Kee Romero on 2023-08-27 08:12:52 PM    CTA ED chest PE Study    Result Date: 8/25/2023  Narrative: CTA - CHEST WITH IV CONTRAST - PULMONARY ANGIOGRAM INDICATION:   Increased SOB, recent COVID diagnosis.  COMPARISON: MRI from 3/18/2020 as well as bone scan from 7/2/2019 and thoracic spine from 11/13/2013 TECHNIQUE: CTA examination of the chest was performed using angiographic technique according to a protocol specifically tailored to evaluate for pulmonary embolism. Multiplanar 2D reformatted images were created from the source data. In addition, coronal 3D MIP postprocessing was performed on the acquisition scanner. The study is limited by some respiratory motion artifacts especially in the lower lobes on the left where there is crowding of vessels due to atelectatic changes. Radiation dose length product (DLP) for this visit:  370 mGy-cm . This examination, like all CT scans performed in the [facility], was performed utilizing techniques to minimize radiation dose exposure, including the use of iterative reconstruction and automated exposure control. IV Contrast:  80 mL of iohexol (OMNIPAQUE) FINDINGS: PULMONARY ARTERIAL TREE: Limited visualization left lower lobe however there is suspicion for a small embolus in the posterobasal segment on axial image 139, series 305 and coronal image 142. No definite filling defect is seen elsewhere. The main pulmonary artery is significantly dilated at 4.2 cm. The RV LV ratio is elevated at 1.1 cm. The primary pulmonary vascular stent minutes are also dilated chronicity is uncertain. LUNGS: Emphysema is noted with blebs at the apices. There is peripheral consolidation in the left upper lobe. Air bronchogram is not well seen and there is mucous occlusion of the left mainstem bronchus with volume loss of the left upper lobe as well as  left lower lobe to a lesser extent. Some aeration in the left lower lobe is still visible. PLEURA:  Unremarkable. HEART/GREAT VESSELS:  Unremarkable for patient's age. No thoracic aortic aneurysm. MEDIASTINUM AND PRASANTH: 10 mm pretracheal node is identified. 9 mm superior mediastinal node is identified. 10 mm prevascular node is identified. Subcarinal node measures 1.5 cm. Hilar adenopathy is also demonstrated. Right hilar node is larger measuring 1.8 cm in short axis on image 138.  CHEST WALL AND LOWER NECK: Unremarkable. VISUALIZED STRUCTURES IN THE UPPER ABDOMEN: Low-density cyst is seen in the right kidney. . OSSEOUS STRUCTURES: There appears to be a destructive lesion involving the T12 vertebral body eroding both endplates and much of the vertebral body this does not appear to represent a Schmorl's node. A cystic lesion was noted in 2013 at this location however the current lesion is larger with loss of endplates. MRI also imaged this lesion in 2020 the lesion appears somewhat larger on the current examination however. Impression: Limited study. There is equivocal embolus in the posterior basal segment left lower lobe. Other smaller segments are difficult to assess due to motion and vascular crowding. There is mucous plugging in the left mainstem bronchus with volume loss in portions of the left lower lobe and left upper lobe. Aspiration pneumonia is not excluded. There is dilatation of the main pulmonary artery and proximal pulmonary segment suggesting pulmonary hypertension this is more likely chronic than acute given the degree of distention. Emphysema is present. There is significant mediastinal and hilar adenopathy suggesting underlying neoplasm more likely than simple reactive nodes. Enlarging lesion at T12 is again demonstrated of uncertain etiology. This has been present since 2013. Perhaps a plasmacytoma is a consideration. Neoplastic work-up is advised. Pulmonology consultation is also advised to assess endobronchial obstruction on the left. This was discussed with resident physician Dr. Kaleb Pringle at 4:58 p.m. Follow-up was marked in the epic system Workstation performed: [id number]     XR chest 1 view portable    Result Date: 8/25/2023  Narrative: CHEST INDICATION:   SOB. COMPARISON: 8/21/2021 EXAM PERFORMED/VIEWS:  XR CHEST PORTABLE Single view FINDINGS: Development of CHF. Probable left basal infiltrate. Cardiomediastinal silhouette has become more enlarged.  No pneumothorax or pleural effusion. Osseous structures appear within normal limits for patient age. Impression: Increased cardiomegaly. Development of CHF.  Probable left basal infiltrate Workstation performed: [id number]

## 2023-10-18 NOTE — CONSULTS
Interventional Radiology  Consultation 10/18/2023     Inpatient Consult to IR  Consult performed by: Sadaf Killian MD  Consult ordered by: MD Benjy Jenkins Haider   [birth year]   [phone]      Assessment/Plan:  77year old female with lymphoma. Pull out PICC line and was refusing chemotherapy. This AM the patient has changed her mind and wishes to pursue chemotherapy. IR consulted for tunneled central line placement. Plan for line placement hopefully tomorrow pending IR schedule.     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    Myofascial pain syndrome    Pain syndrome, chronic    Angio-edema, initial encounter    Abnormal computed tomography angiography (CTA)    Bipolar disorder, unspecified ([address]) (Chronic)    Overview Signed 6/10/2021 10:18 AM by Adalid Matthew     Per ICD-10 coding guidelines and per physician         Nicotine dependence (Chronic)    Bone lesion    CKD (chronic kidney disease) stage 3, GFR 30-59 ml/min (HCC)    Lab Results   Component Value Date    EGFR 65 10/18/2023    EGFR 72 10/17/2023    EGFR 74 10/16/2023    CREATININE 0.89 10/18/2023    CREATININE 0.82 10/17/2023    CREATININE 0.80 10/16/2023         (HFpEF) heart failure with preserved ejection fraction (HCC)    Wt Readings from Last 3 Encounters:   10/18/23 80.4 kg (177 lb 4 oz)   09/07/23 74.6 kg (164 lb 6.4 oz)   08/30/23 73.3 kg (161 lb 9.6 oz)                 Pulmonary embolism (HCC)    Ambulatory dysfunction    Angioedema    Oropharyngeal mass    Blister (nonthermal) of left forearm, sequela    Large cell lymphoma (HCC)    Chemotherapy induced neutropenia     Chronic Superficial thrombophlebitis    Generalized abdominal pain    Depression Subjective:     Patient ID: Ally Wood is a 77 y.o. female. History of Present Illness  77year old female with lymphoma. Pull out PICC line and was refusing chemotherapy. This AM the patient has changed her mind and wishes to pursue chemotherapy. IR consulted for tunneled central line placement.     Review of Systems as above        Past Medical History:   Diagnosis Date    Anxiety     Depression     Fatty liver     Hyperlipidemia     Hypertension     Psychiatric disorder     Varicella         Past Surgical History:   Procedure Laterality Date    BREAST SURGERY Bilateral     Reduction Procedure    CARDIAC SURGERY       SECTION      x4    DILATION AND CURETTAGE OF UTERUS      ECTOPIC PREGNANCY SURGERY      IR GASTROSTOMY TUBE PLACEMENT  2023     [address] INCL FLUOR GDNCE DX W/CELL WASHG 44 [facility] N/A 2023    Procedure: Debi Becker;  Surgeon: Martha May MD;  Location: [facility];  Service: ENT    TRACHEOSTOMY N/A 2023    Procedure: TRACHEOSTOMY, biopsy of nasopharynx mass;  Surgeon: Martha May MD;  Location: [facility];  Service: ENT        Social History     Tobacco Use   Smoking Status Every Day    Packs/day: 1.00    Types: Cigarettes   Smokeless Tobacco Never   Tobacco Comments    2 Black and milds per day        Social History     Substance and Sexual Activity   Alcohol Use Not Currently        Social History     Substance and Sexual Activity   Drug Use Yes    Types: Marijuana    Comment: for pain        Allergies   Allergen Reactions    Methyldopa Shortness Of Breath and Other (See Comments)     Aldomet       Current Facility-Administered Medications   Medication Dose Route Frequency Provider Last Rate Last Admin    acetaminophen (TYLENOL) tablet 650 mg  650 mg Oral Q6H PRN David Brennan MD   650 mg at 10/11/23 2156    acetaminophen (TYLENOL) tablet 650 mg  650 mg Oral Once Lynette Laughlin MD        acyclovir (ZOVIRAX) capsule 400 mg  400 mg Oral BID Lopez Isidro MD   400 mg at 10/18/23 0931    alteplase (CATHFLO) injection 2 mg  2 mg Intracatheter Q1MIN PRN Lopez Isidro MD        alteplase (CATHFLO) injection 2 mg  2 mg Intracatheter Q1MIN PRN Lopez Isidro MD        amLODIPine (NORVASC) tablet 10 mg  10 mg Oral Daily Ky Ly MD   10 mg at 10/18/23 0934    busPIRone (BUSPAR) tablet 30 mg  30 mg Oral TID True Deal MD   30 mg at 10/18/23 0931    chlorhexidine (PERIDEX) 0.12 % oral rinse 15 mL  15 mL Mouth/Throat Q12H [facility] JitendraRaúl Spencer MD   15 mL at 10/18/23 0935    [START ON 10/19/2023] cyclophosphamide (CYTOXAN) 1,350 mg in sodium chloride 0.9 % 250 mL IVPB  750 mg/m2 (Treatment Plan Recorded) Intravenous Once Lopez Isidro MD        diphenhydrAMINE (BENADRYL) 25 mg in sodium chloride 0.9 % 50 mL IVPB  25 mg Intravenous Once Lopez Isidro MD        enoxaparin (LOVENOX) subcutaneous injection 40 mg  40 mg Subcutaneous Q24H [facility] Dianne Calhoun MD   40 mg at 10/18/23 0934    famotidine (PEPCID) tablet 20 mg  20 mg Oral BID JitendraRaúl Spencer MD   20 mg at 10/18/23 0935    fluconazole (DIFLUCAN) tablet 200 mg  200 mg Oral Daily Lopez Isidro MD   200 mg at 10/18/23 0935    gabapentin (NEURONTIN) capsule 300 mg  300 mg Oral TID Aura Spencer MD   300 mg at 10/18/23 0935    guaiFENesin (ROBITUSSIN) oral liquid 200 mg  200 mg Oral Q6H PRN Celine Cade MD        hydrALAZINE (APRESOLINE) injection 5 mg  5 mg Intravenous Q6H PRN Ky Ly MD   5 mg at 10/16/23 4359    levalbuterol (Parisa Satchel) inhalation solution 1.25 mg  1.25 mg Nebulization TID Noé Jamison MD   1.25 mg at 10/18/23 1339    And    ipratropium (ATROVENT) 0.02 % inhalation solution 0.5 mg  0.5 mg Nebulization TID Noé Jamison MD   0.5 mg at 10/18/23 1339    levofloxacin (LEVAQUIN) tablet 250 mg  250 mg Oral Q24H [facility] Lopez Isidro MD   250 mg at 10/18/23 0961    melatonin tablet 3 mg  3 mg Oral HS RANDEE Ya   3 mg at 10/17/23 2125    metoprolol tartrate (LOPRESSOR) tablet 25 mg  25 mg Oral Q12H [facility] Koki Carreno MD   25 mg at 10/18/23 0935    nicotine (NICODERM CQ) 7 mg/24hr TD 24 hr patch 7 mg  7 mg Transdermal Daily Yosvany Rinaldi MD   7 mg at 10/18/23 0932    ondansetron (ZOFRAN) injection 4 mg  4 mg Intravenous Q8H PRN Giovanni Browne MD   4 mg at 10/12/23 0808    ondansetron (ZOFRAN) injection 4 mg  4 mg Intravenous Q8H PRN Ceci Rosario MD        oxyCODONE (ROXICODONE) oral solution 2.5 mg  2.5 mg Oral Q6H PRN Koki Carreno MD        oxyCODONE (ROXICODONE) oral solution 5 mg  5 mg Oral Q8H Kane Cramer MD   5 mg at 10/18/23 1327    polyethylene glycol (MIRALAX) packet 17 g  17 g Oral Daily PRN Giovanni Browne MD   17 g at 09/24/23 1121    QUEtiapine (SEROquel) tablet 50 mg  50 mg Oral HS RANDEE Ellis   50 mg at 10/17/23 2125    riTUXimab (RITUXAN) 675 mg in sodium chloride 0.9 % 270 mL subsequent titrated chemo infusion  375 mg/m2 (Treatment Plan Recorded) Intravenous Once Ceci Rosario MD        senna oral syrup 8.8 mg  8.8 mg Per NG Tube  Trent Spencer MD   8.8 mg at 10/14/23 2114    sertraline (ZOLOFT) tablet 75 mg  75 mg Per PEG Tube Daily RANDEE Esquivel   75 mg at 10/18/23 0931    [START ON 10/19/2023] sodium chloride 0.9 % bolus 2,000 mL  2,000 mL Intravenous Once Ceci Rosario MD        sodium chloride 0.9 % infusion  20 mL/hr Intravenous Once Ceci Rosario MD        sodium chloride 3 % inhalation solution 4 mL  4 mL Nebulization Q6H PRN Oracio MEJIA Xavi   4 mL at 10/16/23 9054    sulfamethoxazole-trimethoprim (BACTRIM DS) 800-160 mg per tablet 1 tablet  1 tablet Oral Once per day on Mon Wed Fri Ceci Rosario MD   1 tablet at 10/18/23 0931          Objective:    Vitals:    10/18/23 1036 10/18/23 1339 10/18/23 1500 10/18/23 1600   BP:   144/79 144/79   BP Location:   Left arm    Pulse:   98 87   Resp:   (!) 24 14   Temp:   98.6 °F (37 °C)    TempSrc:   Oral    SpO2: 92% 98% 91% (!) 86%   Weight:       Height:            Physical Exam  Not performed    Lab Results   Component Value Date     (H) 09/29/2023      Lab Results   Component Value Date    WBC 6.42 10/18/2023    HGB 8.9 (L) 10/18/2023    HCT 27.8 (L) 10/18/2023    MCV 93 10/18/2023     (H) 10/18/2023     Lab Results   Component Value Date    INR 0.96 09/27/2023    INR 0.92 09/13/2023    INR 0.97 08/25/2023    PROTIME 13.4 09/27/2023    PROTIME 12.7 09/13/2023    PROTIME 13.5 08/25/2023     Lab Results   Component Value Date    PTT 80 (H) 08/28/2023         I have personally reviewed pertinent imaging and laboratory results. Code Status: Level 1 - Full Code  Advance Directive and Living Will:      Power of :    POLST:      IR has been consulted to evaluate the patient, determine the appropriate procedure, and whether or not a procedure can and should be performed. Thank you for allowing me to participate in the care of [facility]. Please don't hesitate to call, text, email, or TigerText with any questions. This text is generated with voice recognition software. There may be translation, syntax,  or grammatical errors. If you have any questions, please contact the dictating provider.

## 2023-10-18 NOTE — PROGRESS NOTES
[facility]  Progress Note: Critical Care  Name: Rocky Downing 77 y.o. female I MRN: [id number]  Unit/Bed#: ICU 03 I Date of Admission: 9/13/2023   Date of Service: 10/18/2023 I Hospital Day: 28    Assessment/Plan   * Acute respiratory failure with hypoxia secondary to oropharyngeal mass  Assessment & Plan  Cuffless trach placed 9/17, transitioned to cuffed on 10/3. Oxygen requirements not improving. For mental health patient is on buspirone, quetiapine, and sertraline. For pain, gabapentin, oxycodone scheduled and prn, prn oxycodone mainly utilized for increased pain during chemo and after a bronch. On Guaifenesin, Atrovent and Xopenex for airway maintenance. No improvement in bilateral consolidation or atelectasis and groundglass opacities on repeat CT and chest x-rays. Patient not receiving proper positive pressure to open up atelectatic lung. Bronchial cx with 3 colonies CoNS. PCP PCR invalid, repeat negative. CTCAP indicates additional groundglass opacity with paraseptal emphysema, which may be contributing to inability to remain off vent. SBTs have been unsuccessful to place patient on trach collar since return to ICU. Patient tolerated high flow all day and overnight    Plan:  Maintain cuff overnight from 9 pm to 6 am for positive pressure, can open cuff during the day for patient to speak  8/8 tonight, can attempt CPAP next  Repeat Bcx if febrile  Consider repeat bronch for DAH if Hgb dropping again    Large cell lymphoma ([address])  Assessment & Plan  Large B-cell lymphoma, stage II. First chemo cycle 9/22 4 days of R-EPOCH. 9/27 Rituxan. 9/28 Filgrastim. Acyclovir, Zyloprim, Diflucan, Levaquin, Zofran, Bactrim for chemo prophylaxis, all discontinued with heme-onc's approval as Nafisa reached 4200. I suspect leukocytosis is secondary to the filgrastim. Restarted all ppx 10/13 for [facility] cycle 2 which will run for the next 4 days.  Ppx and filgrastim can be discontinued when patient is no longer neutropenic after this cycle again. Platelets have been up trending to 696, which did not happen during cycle 1. This could simply be reactive from inflammation, but could also occur from vincristine which is part of current regimen in combination with the residual effect from filgastrim. Likely just reactive but if continues to rise consider checking blood smear, ferritin, ESR, CRP, Jak2, CALR, MPL, BCR-ABL1. Glucose is increasing on past 2 morning draws, appears to be 2/2 prednisolone from regimen. PICC came out with 3 hours chemo remaining    Plan:  Keep Hgb>7 and Plt>20 for chemo  Consult IR for tunneled line insertion  See acute respiratory failure above    Oropharyngeal mass  Assessment & Plan  9/14 Neck/ soft tissue CT: Large enhancing soft tissue mass identified within the left neck involving multiple spaces. Centered within the left oropharynx with extensions as described above into multiple spaces. This results in significant airway compromise. 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 Small level 3/level 4 nodes are seen within the neck. Tracheostomy by ENT, bx & addition testing performed. Replaced on 9/26. H/o tobacco abuse, daily smoker. On nicotine while inpatient, confirmed with patient she needs nicotine patch. CT soft tissue neck shows reduced mass effect from 9/14 to 10/13    Plan:  ENT and heme onc following  Will titrate NRT weekly  See acute respiratory failure and large B cell lymphoma above      (HFpEF) heart failure with preserved ejection fraction [facility])  Assessment & Plan  Wt Readings from Last 3 Encounters:   10/17/23 80.4 kg (177 lb 4 oz)   09/07/23 74.6 kg (164 lb 6.4 oz)   08/30/23 73.3 kg (161 lb 9.6 oz)     Lab Results   Component Value Date    LVEF 60 08/28/2023     (H) 09/29/2023     (H) 09/13/2023     Echo 8/28/23: LVEF 60%.       Plan:  Mg > 2 and K > 4   Measure I/O      Angioedema  Assessment & Plan  POA in [facility] [place] [facility]: Swollen tongue, soft and hard palate with severe angioedema. Decadron 10 mg, Benadryl 25 mg given. Initially refused but eventually agreed to intubation. 2/2 oropharyngeal mass compression. Plan:  Cuffless trach 9/17, transitioned to cuffed 10/3  See acute respiratory failure and oropharyngeal mass above    Ambulatory dysfunction  Assessment & Plan  Impacted by chronic pain syndrome, but medication regimen may also contribute. Patient currently requires tracheostomy with ICU level care and will be inpatient for R-EPOCH cycle 2 10/13 to 10/17. Continue OOB with PT. Cannot get LTACH placement while getting chemo per CM. Pain syndrome, chronic  Assessment & Plan  Home regimen: Oxycodone 5 mg BID, Tylenol 650 mg q8 prn. Gabapentin 600 mg TID. Medical marijuana. Lumbar radiculopathy and impaired ambulatory dysfunction secondary to pain. Has bowel regimen and is having bowel movements daily. Patient required additional pain management during previous cycle    Plan:  Continue gabapentin 300 mg TID, oxycodone 5 mg TID, prn Tylenol 650 mg q6. Oxycodone 2.5 mg prn can d/c a day after line replaced    Benign essential hypertension  Assessment & Plan  Home regimen Coreg 12.5 mg BID, Amlodipine 10 mg daily, and lisinopril 40 mg. All discontinued at this time secondary to hypotension and PO since cycle 1. but stable currently without any antihypertensives. Systolic persistently elevated and tachycardic, potentially secondary to pain. Patient was more hypotensive during last chemo. Coreg contraindicated during chemo. Plan:  Monitor vitals. Continue Norvasc 10 and lopressor 25 bid  Prn hydralazine for SBP > 160    Depression  Assessment & Plan  Patient on Zoloft 75 daily and Seroquel evening. Patient is depressed, family meeting with palliative on 10/5 for goals of care. Patient is firm that she wants to live and to fight, she is just tired.  Palliative signed off, will reconsult if patient changes her mind regarding wishes    Generalized abdominal pain  Assessment & Plan  Patient reports abdominal pain which is unchanged since 9/22 no guarding on exam. Takes Famotidine for GERD at home. Alk phos 10/2 145, 10/18 102. CTCAP reveals complex right upper pole renal lesion requiring possible outpatient MRI    - Continue Famotidine  - On bowel regimen with Senna and Miralax prn    Chronic Superficial thrombophlebitis  Assessment & Plan  Right forearm soreness. RUE US: No acute or chronic deep vein thrombosis. 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 No longer requiring renal dosing as PO resolved. -Continue DVT ppx with Lovenox 40    Blister (nonthermal) of left forearm, sequela  Assessment & Plan  Blisters of left upper extremity healing, no signs of infection, continue daily wound care    CKD (chronic kidney disease) stage 3, GFR 30-59 ml/min [facility])  Assessment & Plan  Lab Results   Component Value Date    EGFR 65 10/18/2023    EGFR 72 10/17/2023    EGFR 74 10/16/2023    CREATININE 0.89 10/18/2023    CREATININE 0.82 10/17/2023    CREATININE 0.80 10/16/2023     Baseline Cr appears to be between 0.75-1.1. Patient received 2 doses of Bumex IV 2 g as she had not been responding appropriately to IV 40 Lasix daily. Creatinine went up by 0.5 meeting criteria for an PO. This may be prerenal intrinsic or postrenal.  Potential prerenal causes include reduced kidney perfusion due to lisinopril. Intrinsic can also be lisinopril due to ATN, AIN from chemo medications, TLS, crystaline nephropathy from acyclovir, rituxan nephrotoxicity, filgrastim glomerulonephritis. Post renal obstructive could be from urine retention from vincristine or cyclophosphamide bladder fibrosis. Suspect most likely due to medications as a combination of prerenal and intrinsic. FENa was 0.2%, UA shows no casts or signs of infection, urine eosinophils 0%. Patient likely has prerenal PO from volume depletion.  Received 2 L NS and 1 pRBC and cr went up by 0.07 still and Na and Cl went down, suspect that volume prevented further cr rise and free water excretion impaired by kidney function, allowing hyponatremia to increase. Creatinine increase is likely plateaued and went down the following day. Suspect that now that nephrotoxic medications have been stopped she will continue to respond well to Lasix and creatinine will continue to downtrend. PO now resolved. Will be cautious about nephrotoxins and monitor as restarting EPOCH and ppx. Patient gets volume depleted and overloaded very easily. Especially from chemo. Cr at baseline. ICU Core Measures     A: Assess, Prevent, and Manage Pain Has pain been assessed? Yes  Need for changes to pain regimen? No   B: Both SAT/SAT  N/A   C: Choice of Sedation RASS Goal: 0 Alert and Calm or N/A patient not on sedation  Need for changes to sedation or analgesia regimen? No   D: Delirium CAM-ICU: Negative   E: Early Mobility  Plan for early mobility? No   F: Family Engagement Plan for family engagement today? No       Antibiotic Review: Continue broad spectrum secondary to severity of illness. Review of Invasive Devices:            Prophylaxis:  VTE VTE covered by:  enoxaparin, Subcutaneous, 40 mg at 10/17/23 0848       Stress Ulcer  covered byfamotidine (PEPCID) oral suspension 40 mg [[id number]], pantoprazole (PROTONIX) injection 40 mg [[id number]]          Subjective   HPI/24hr events:   No acute events overnight. This morning patient reaffirms she wants to live and get line put back in. Review of Systems   Constitutional:  Negative for chills and fever. Respiratory:  Negative for shortness of breath and wheezing. Cardiovascular:  Negative for chest pain, palpitations and leg swelling. Gastrointestinal:  Positive for nausea.           Objective                            Vitals I/O      Most Recent Min/Max in 24hrs   Temp 98.7 °F (37.1 °C) Temp  Min: 98.6 °F (37 °C)  Max: 99.2 °F (37.3 °C)   Pulse 77 Pulse  Min: 77 Max: 93   Resp (!) 9 Resp  Min: 9  Max: 20   /83 BP  Min: 129/76  Max: 151/83   O2 Sat 95 % SpO2  Min: 90 %  Max: 100 %      Intake/Output Summary (Last 24 hours) at 10/18/2023 0808  Last data filed at 10/18/2023 0401  Gross per 24 hour   Intake 1050 ml   Output 1350 ml   Net -300 ml         Diet Enteral/Parenteral; Tube Feeding No Oral Diet; Two Telly HN; Continuous; 38; 190; Water; Every 4 hours     Invasive Monitoring Physical exam    Physical Exam  Constitutional:       General: She is not in acute distress. HENT:      Head: Normocephalic and atraumatic. Eyes:      Extraocular Movements: Extraocular movements intact. Cardiovascular:      Rate and Rhythm: Normal rate and regular rhythm. Heart sounds: Normal heart sounds. No murmur heard. Pulmonary:      Effort: Pulmonary effort is normal.      Breath sounds: Rhonchi present. Musculoskeletal:      Right lower leg: No edema. Left lower leg: No edema. Skin:     Coloration: Skin is not jaundiced or pale. Neurological:      Mental Status: She is alert and oriented to person, place, and time.            Diagnostic Studies      No new imaging to review at this time     Medications:  Scheduled PRN   acetaminophen, 650 mg, Once  acyclovir, 400 mg, BID  amLODIPine, 10 mg, Daily  busPIRone, 30 mg, TID  chlorhexidine, 15 mL, Q12H ASHLEY  cyclophosphamide (CYTOXAN) 1,350 mg in sodium chloride 0.9 % 250 mL IVPB, 750 mg/m2 (Treatment Plan Recorded), Once  diphenhydrAMINE (BENADRYL) 25 mg in sodium chloride 0.9 % 50 mL IVPB, 25 mg, Once  enoxaparin, 40 mg, Q24H ASHLEY  famotidine, 20 mg, BID  fluconazole, 200 mg, Daily  gabapentin, 300 mg, TID  levalbuterol, 1.25 mg, TID   And  ipratropium, 0.5 mg, TID  levofloxacin, 250 mg, Q24H [address]  melatonin, 3 mg, HS  metoprolol tartrate, 25 mg, Q12H [address]  nicotine, 7 mg, Daily  oxyCODONE, 5 mg, Q8H  QUEtiapine, 50 mg, HS  riTUXimab (RITUXAN) 675 mg in sodium chloride 0.9 % 270 mL subsequent titrated chemo infusion, 375 mg/m2 (Treatment Plan Recorded), Once  senna, 8.8 mg, HS  sertraline, 75 mg, Daily  sodium chloride, 2,000 mL, Once  sodium chloride, 20 mL/hr, Once  sulfamethoxazole-trimethoprim, 1 tablet, Once per day on Mon Wed Fri      acetaminophen, 650 mg, Q6H PRN  alteplase, 2 mg, Q1MIN PRN  alteplase, 2 mg, Q1MIN PRN  guaiFENesin, 200 mg, Q6H PRN  hydrALAZINE, 5 mg, Q6H PRN  ondansetron, 4 mg, Q8H PRN  ondansetron, 4 mg, Q8H PRN  oxyCODONE, 2.5 mg, Q6H PRN  polyethylene glycol, 17 g, Daily PRN  sodium chloride, 4 mL, Q6H PRN       Continuous          Labs:    CBC    Recent Labs     10/17/23  0539 10/18/23  0542   WBC 6.29 6.42   HGB 9.4* 8.9*   HCT 29.2* 27.8*   * 774*     BMP    Recent Labs     10/17/23  0539 10/18/23  0542   SODIUM 137 133*   K 4.0 4.3   CL 99 96   CO2 31 29   AGAP 7 8   BUN 39* 49*   CREATININE 0.82 0.89   CALCIUM 9.6 9.6       Coags    No recent results     Additional Electrolytes  Recent Labs     10/17/23  0539   MG 2.1          Blood Gas    No recent results  No recent results LFTs  Recent Labs     10/17/23  0539 10/18/23  0542   ALT 37 34   AST 24 19   ALKPHOS 113* 102   ALB 3.1* 3.0*   TBILI 0.34 0.35       Infectious  No recent results  Glucose  Recent Labs     10/17/23  0539 10/18/23  0542   GLUC 153* 1001 Harpreet Gupta MD

## 2023-10-18 NOTE — CASE MANAGEMENT
Case Management Progress Note    Patient name Katty Maharaj  Location ICU 03/ICU 03 MRN [id number]  : 1955 Date 10/18/2023       LOS (days): 35  Geometric Mean LOS (GMLOS) (days): 26.10  Days to GMLOS:-9        OBJECTIVE:        Current admission status: Inpatient  Preferred Pharmacy:   [id number] [place] - [address]. [address] [place] [address]  Phone: [phone] Fax: [phone] [address]  Phone: [phone] Fax: [phone]    Primary Care Provider: Kelli Del Rosario MD    Primary Insurance: [facility]  Secondary Insurance: [facility]    PROGRESS NOTE:    Weekly Care Management Length of Stay Review     Current LOS: 35 Days    Most Recent Labs:     Lab Results   Component Value Date/Time    WBC 6.42 10/18/2023 05:42 AM    HGB 8.9 (L) 10/18/2023 05:42 AM    HCT 27.8 (L) 10/18/2023 05:42 AM     (H) 10/18/2023 05:42 AM    SODIUM 133 (L) 10/18/2023 05:42 AM    K 4.3 10/18/2023 05:42 AM    CL 96 10/18/2023 05:42 AM    CO2 29 10/18/2023 05:42 AM    BUN 49 (H) 10/18/2023 05:42 AM    CREATININE 0.89 10/18/2023 05:42 AM    GLUC 135 10/18/2023 05:42 AM    ALKPHOS 102 10/18/2023 05:42 AM    ALT 34 10/18/2023 05:42 AM    AST 19 10/18/2023 05:42 AM    ALB 3.0 (L) 10/18/2023 05:42 AM    TBILI 0.35 10/18/2023 05:42 AM       Most Recent Vitals:   Vitals:    10/18/23 1600   BP: 144/79   Pulse: 87   Resp: 14   Temp:    SpO2: (!) 86%        Identified Barriers to Discharge/Discharge Goals/Care Management Interventions: pt. Declined chemo yesterday. Onco to have meeting to discuss further chemo treatment    Intended Discharge Disposition: LTACH-not an option while on Chemo.  STR following    Expected Discharge Date: TBD

## 2023-10-19 ENCOUNTER — ANESTHESIA (INPATIENT)
Dept: RADIOLOGY | Facility: HOSPITAL | Age: 68
End: 2023-10-19
Payer: COMMERCIAL

## 2023-10-19 ENCOUNTER — APPOINTMENT (INPATIENT)
Dept: RADIOLOGY | Facility: HOSPITAL | Age: 68
DRG: 004 | End: 2023-10-19
Attending: RADIOLOGY
Payer: COMMERCIAL

## 2023-10-19 ENCOUNTER — APPOINTMENT (INPATIENT)
Dept: RADIOLOGY | Facility: HOSPITAL | Age: 68
DRG: 004 | End: 2023-10-19
Payer: COMMERCIAL

## 2023-10-19 ENCOUNTER — ANESTHESIA EVENT (INPATIENT)
Dept: RADIOLOGY | Facility: HOSPITAL | Age: 68
End: 2023-10-19
Payer: COMMERCIAL

## 2023-10-19 LAB
ALBUMIN SERPL BCP-MCNC: 3 G/DL (ref 3.5–5)
ALP SERPL-CCNC: 93 U/L (ref 34–104)
ALT SERPL W P-5'-P-CCNC: 27 U/L (ref 7–52)
ANION GAP SERPL CALCULATED.3IONS-SCNC: 8 MMOL/L
AST SERPL W P-5'-P-CCNC: 14 U/L (ref 13–39)
BASOPHILS # BLD MANUAL: 0 THOUSAND/UL (ref 0–0.1)
BASOPHILS NFR MAR MANUAL: 0 % (ref 0–1)
BILIRUB SERPL-MCNC: 0.59 MG/DL (ref 0.2–1)
BUN SERPL-MCNC: 52 MG/DL (ref 5–25)
CALCIUM ALBUM COR SERPL-MCNC: 10.3 MG/DL (ref 8.3–10.1)
CALCIUM SERPL-MCNC: 9.5 MG/DL (ref 8.4–10.2)
CHLORIDE SERPL-SCNC: 99 MMOL/L (ref 96–108)
CO2 SERPL-SCNC: 30 MMOL/L (ref 21–32)
CREAT SERPL-MCNC: 1.22 MG/DL (ref 0.6–1.3)
EOSINOPHIL # BLD MANUAL: 0.21 THOUSAND/UL (ref 0–0.4)
EOSINOPHIL NFR BLD MANUAL: 2 % (ref 0–6)
ERYTHROCYTE [DISTWIDTH] IN BLOOD BY AUTOMATED COUNT: 14.9 % (ref 11.6–15.1)
GFR SERPL CREATININE-BSD FRML MDRD: 44 ML/MIN/1.73SQ M
GLUCOSE SERPL-MCNC: 91 MG/DL (ref 65–140)
HCT VFR BLD AUTO: 29 % (ref 34.8–46.1)
HGB BLD-MCNC: 9.4 G/DL (ref 11.5–15.4)
LDH SERPL-CCNC: 188 U/L (ref 140–271)
LYMPHOCYTES # BLD AUTO: 1.17 THOUSAND/UL (ref 0.6–4.47)
LYMPHOCYTES # BLD AUTO: 11 % (ref 14–44)
MCH RBC QN AUTO: 30.6 PG (ref 26.8–34.3)
MCHC RBC AUTO-ENTMCNC: 32.4 G/DL (ref 31.4–37.4)
MCV RBC AUTO: 95 FL (ref 82–98)
MONOCYTES # BLD AUTO: 0.11 THOUSAND/UL (ref 0–1.22)
MONOCYTES NFR BLD: 1 % (ref 4–12)
NEUTROPHILS # BLD MANUAL: 9.12 THOUSAND/UL (ref 1.85–7.62)
NEUTS BAND NFR BLD MANUAL: 1 % (ref 0–8)
NEUTS SEG NFR BLD AUTO: 85 % (ref 43–75)
PLATELET # BLD AUTO: 780 THOUSANDS/UL (ref 149–390)
PLATELET BLD QL SMEAR: ABNORMAL
PMV BLD AUTO: 9.6 FL (ref 8.9–12.7)
POTASSIUM SERPL-SCNC: 4.1 MMOL/L (ref 3.5–5.3)
PROT SERPL-MCNC: 5.7 G/DL (ref 6.4–8.4)
RBC # BLD AUTO: 3.07 MILLION/UL (ref 3.81–5.12)
RBC MORPH BLD: NORMAL
SODIUM SERPL-SCNC: 137 MMOL/L (ref 135–147)
URATE SERPL-MCNC: 5.8 MG/DL (ref 2–7.5)
WBC # BLD AUTO: 10.6 THOUSAND/UL (ref 4.31–10.16)

## 2023-10-19 PROCEDURE — C1769 GUIDE WIRE: HCPCS

## 2023-10-19 PROCEDURE — 71045 X-RAY EXAM CHEST 1 VIEW: CPT

## 2023-10-19 PROCEDURE — C1894 INTRO/SHEATH, NON-LASER: HCPCS

## 2023-10-19 PROCEDURE — 94640 AIRWAY INHALATION TREATMENT: CPT

## 2023-10-19 PROCEDURE — 85007 BL SMEAR W/DIFF WBC COUNT: CPT | Performed by: INTERNAL MEDICINE

## 2023-10-19 PROCEDURE — 99291 CRITICAL CARE FIRST HOUR: CPT | Performed by: STUDENT IN AN ORGANIZED HEALTH CARE EDUCATION/TRAINING PROGRAM

## 2023-10-19 PROCEDURE — 36558 INSERT TUNNELED CV CATH: CPT

## 2023-10-19 PROCEDURE — 80053 COMPREHEN METABOLIC PANEL: CPT | Performed by: INTERNAL MEDICINE

## 2023-10-19 PROCEDURE — 99233 SBSQ HOSP IP/OBS HIGH 50: CPT | Performed by: INTERNAL MEDICINE

## 2023-10-19 PROCEDURE — 83615 LACTATE (LD) (LDH) ENZYME: CPT | Performed by: INTERNAL MEDICINE

## 2023-10-19 PROCEDURE — C1751 CATH, INF, PER/CENT/MIDLINE: HCPCS

## 2023-10-19 PROCEDURE — 94150 VITAL CAPACITY TEST: CPT

## 2023-10-19 PROCEDURE — 94664 DEMO&/EVAL PT USE INHALER: CPT

## 2023-10-19 PROCEDURE — 85027 COMPLETE CBC AUTOMATED: CPT | Performed by: INTERNAL MEDICINE

## 2023-10-19 PROCEDURE — 94760 N-INVAS EAR/PLS OXIMETRY 1: CPT

## 2023-10-19 PROCEDURE — 36558 INSERT TUNNELED CV CATH: CPT | Performed by: STUDENT IN AN ORGANIZED HEALTH CARE EDUCATION/TRAINING PROGRAM

## 2023-10-19 PROCEDURE — 84550 ASSAY OF BLOOD/URIC ACID: CPT | Performed by: INTERNAL MEDICINE

## 2023-10-19 PROCEDURE — 02HV33Z INSERTION OF INFUSION DEVICE INTO SUPERIOR VENA CAVA, PERCUTANEOUS APPROACH: ICD-10-PCS | Performed by: INTERNAL MEDICINE

## 2023-10-19 PROCEDURE — 77001 FLUOROGUIDE FOR VEIN DEVICE: CPT | Performed by: STUDENT IN AN ORGANIZED HEALTH CARE EDUCATION/TRAINING PROGRAM

## 2023-10-19 PROCEDURE — 76937 US GUIDE VASCULAR ACCESS: CPT | Performed by: STUDENT IN AN ORGANIZED HEALTH CARE EDUCATION/TRAINING PROGRAM

## 2023-10-19 PROCEDURE — 76937 US GUIDE VASCULAR ACCESS: CPT

## 2023-10-19 PROCEDURE — 77001 FLUOROGUIDE FOR VEIN DEVICE: CPT

## 2023-10-19 RX ORDER — CEFAZOLIN SODIUM 1 G/50ML
SOLUTION INTRAVENOUS AS NEEDED
Status: DISCONTINUED | OUTPATIENT
Start: 2023-10-19 | End: 2023-10-19

## 2023-10-19 RX ORDER — SODIUM CHLORIDE, SODIUM LACTATE, POTASSIUM CHLORIDE, CALCIUM CHLORIDE 600; 310; 30; 20 MG/100ML; MG/100ML; MG/100ML; MG/100ML
INJECTION, SOLUTION INTRAVENOUS CONTINUOUS PRN
Status: DISCONTINUED | OUTPATIENT
Start: 2023-10-19 | End: 2023-10-19

## 2023-10-19 RX ORDER — FENTANYL CITRATE 50 UG/ML
INJECTION, SOLUTION INTRAMUSCULAR; INTRAVENOUS AS NEEDED
Status: DISCONTINUED | OUTPATIENT
Start: 2023-10-19 | End: 2023-10-19

## 2023-10-19 RX ORDER — PROPOFOL 10 MG/ML
INJECTION, EMULSION INTRAVENOUS CONTINUOUS PRN
Status: DISCONTINUED | OUTPATIENT
Start: 2023-10-19 | End: 2023-10-19

## 2023-10-19 RX ADMIN — IPRATROPIUM BROMIDE 0.5 MG: 0.5 SOLUTION RESPIRATORY (INHALATION) at 07:57

## 2023-10-19 RX ADMIN — CEFAZOLIN SODIUM 1000 MG: 1 SOLUTION INTRAVENOUS at 10:33

## 2023-10-19 RX ADMIN — GABAPENTIN 300 MG: 300 CAPSULE ORAL at 09:10

## 2023-10-19 RX ADMIN — FAMOTIDINE 20 MG: 20 TABLET, FILM COATED ORAL at 09:11

## 2023-10-19 RX ADMIN — FAMOTIDINE 20 MG: 20 TABLET, FILM COATED ORAL at 17:03

## 2023-10-19 RX ADMIN — FENTANYL CITRATE 25 MCG: 50 INJECTION INTRAMUSCULAR; INTRAVENOUS at 10:25

## 2023-10-19 RX ADMIN — PHENYLEPHRINE HYDROCHLORIDE 100 MCG: 10 INJECTION INTRAVENOUS at 10:45

## 2023-10-19 RX ADMIN — METOPROLOL TARTRATE 25 MG: 25 TABLET, FILM COATED ORAL at 22:30

## 2023-10-19 RX ADMIN — METOPROLOL TARTRATE 25 MG: 25 TABLET, FILM COATED ORAL at 09:10

## 2023-10-19 RX ADMIN — Medication 5 ML: at 10:44

## 2023-10-19 RX ADMIN — PROPOFOL 50 MG: 10 INJECTION, EMULSION INTRAVENOUS at 10:43

## 2023-10-19 RX ADMIN — FENTANYL CITRATE 25 MCG: 50 INJECTION INTRAMUSCULAR; INTRAVENOUS at 10:32

## 2023-10-19 RX ADMIN — OXYCODONE HYDROCHLORIDE 5 MG: 5 SOLUTION ORAL at 22:29

## 2023-10-19 RX ADMIN — MELATONIN 3 MG: at 22:34

## 2023-10-19 RX ADMIN — GABAPENTIN 300 MG: 300 CAPSULE ORAL at 22:30

## 2023-10-19 RX ADMIN — PHENYLEPHRINE HYDROCHLORIDE 100 MCG: 10 INJECTION INTRAVENOUS at 10:48

## 2023-10-19 RX ADMIN — ACYCLOVIR 400 MG: 200 CAPSULE ORAL at 17:04

## 2023-10-19 RX ADMIN — PROPOFOL 30 MCG/KG/MIN: 10 INJECTION, EMULSION INTRAVENOUS at 10:20

## 2023-10-19 RX ADMIN — LEVALBUTEROL HYDROCHLORIDE 1.25 MG: 1.25 SOLUTION RESPIRATORY (INHALATION) at 13:47

## 2023-10-19 RX ADMIN — AMLODIPINE BESYLATE 10 MG: 5 TABLET ORAL at 09:11

## 2023-10-19 RX ADMIN — ACYCLOVIR 400 MG: 200 CAPSULE ORAL at 09:12

## 2023-10-19 RX ADMIN — PHENYLEPHRINE HYDROCHLORIDE 100 MCG: 10 INJECTION INTRAVENOUS at 10:43

## 2023-10-19 RX ADMIN — ENOXAPARIN SODIUM 40 MG: 40 INJECTION SUBCUTANEOUS at 09:11

## 2023-10-19 RX ADMIN — BUSPIRONE HYDROCHLORIDE 30 MG: 10 TABLET ORAL at 22:30

## 2023-10-19 RX ADMIN — BUSPIRONE HYDROCHLORIDE 30 MG: 10 TABLET ORAL at 09:10

## 2023-10-19 RX ADMIN — FLUCONAZOLE 200 MG: 100 TABLET ORAL at 09:10

## 2023-10-19 RX ADMIN — CHLORHEXIDINE GLUCONATE 15 ML: 1.2 SOLUTION ORAL at 22:30

## 2023-10-19 RX ADMIN — CHLORHEXIDINE GLUCONATE 15 ML: 1.2 SOLUTION ORAL at 09:10

## 2023-10-19 RX ADMIN — LEVOFLOXACIN 250 MG: 250 TABLET, FILM COATED ORAL at 09:11

## 2023-10-19 RX ADMIN — PHENYLEPHRINE HYDROCHLORIDE 200 MCG: 10 INJECTION INTRAVENOUS at 11:10

## 2023-10-19 RX ADMIN — ONDANSETRON 4 MG: 2 INJECTION INTRAMUSCULAR; INTRAVENOUS at 14:54

## 2023-10-19 RX ADMIN — LEVALBUTEROL HYDROCHLORIDE 1.25 MG: 1.25 SOLUTION RESPIRATORY (INHALATION) at 07:57

## 2023-10-19 RX ADMIN — BUSPIRONE HYDROCHLORIDE 30 MG: 10 TABLET ORAL at 17:04

## 2023-10-19 RX ADMIN — SODIUM CHLORIDE 2000 ML: 0.9 INJECTION, SOLUTION INTRAVENOUS at 09:01

## 2023-10-19 RX ADMIN — Medication 8.8 MG: at 22:31

## 2023-10-19 RX ADMIN — PHENYLEPHRINE HYDROCHLORIDE 100 MCG: 10 INJECTION INTRAVENOUS at 11:07

## 2023-10-19 RX ADMIN — CYCLOPHOSPHAMIDE 1350 MG: 1 INJECTION, POWDER, FOR SOLUTION INTRAVENOUS; ORAL at 12:21

## 2023-10-19 RX ADMIN — GABAPENTIN 300 MG: 300 CAPSULE ORAL at 17:03

## 2023-10-19 RX ADMIN — IPRATROPIUM BROMIDE 0.5 MG: 0.5 SOLUTION RESPIRATORY (INHALATION) at 13:47

## 2023-10-19 RX ADMIN — SODIUM CHLORIDE 20 ML/HR: 0.9 INJECTION, SOLUTION INTRAVENOUS at 12:19

## 2023-10-19 RX ADMIN — SODIUM CHLORIDE, SODIUM LACTATE, POTASSIUM CHLORIDE, AND CALCIUM CHLORIDE: .6; .31; .03; .02 INJECTION, SOLUTION INTRAVENOUS at 10:08

## 2023-10-19 RX ADMIN — FENTANYL CITRATE 25 MCG: 50 INJECTION INTRAMUSCULAR; INTRAVENOUS at 10:20

## 2023-10-19 RX ADMIN — OXYCODONE HYDROCHLORIDE 2.5 MG: 5 SOLUTION ORAL at 14:56

## 2023-10-19 RX ADMIN — LEVALBUTEROL HYDROCHLORIDE 1.25 MG: 1.25 SOLUTION RESPIRATORY (INHALATION) at 20:33

## 2023-10-19 RX ADMIN — IPRATROPIUM BROMIDE 0.5 MG: 0.5 SOLUTION RESPIRATORY (INHALATION) at 20:33

## 2023-10-19 RX ADMIN — SERTRALINE 75 MG: 25 TABLET, FILM COATED ORAL at 09:10

## 2023-10-19 RX ADMIN — Medication 5 ML: at 10:34

## 2023-10-19 RX ADMIN — FENTANYL CITRATE 25 MCG: 50 INJECTION INTRAMUSCULAR; INTRAVENOUS at 10:29

## 2023-10-19 RX ADMIN — QUETIAPINE FUMARATE 50 MG: 25 TABLET ORAL at 22:30

## 2023-10-19 NOTE — ANESTHESIA PREPROCEDURE EVALUATION
Procedure:  IR TUNNELED CENTRAL LINE PLACEMENT    Relevant Problems   CARDIO   (+) Benign essential hypertension      /RENAL   (+) CKD (chronic kidney disease) stage 3, GFR 30-59 ml/min (HCC)      HEMATOLOGY   (+) Large cell lymphoma (HCC)      MUSCULOSKELETAL   (+) Disc degeneration, lumbar   (+) Myofascial pain syndrome      NEURO/PSYCH   (+) Depression   (+) Myofascial pain syndrome   (+) Pain syndrome, chronic      PULMONARY   (+) Acute respiratory failure with hypoxia secondary to oropharyngeal mass        Physical Exam    Airway  Comment: Tracheostomy present           Dental   No notable dental hx     Cardiovascular  Rhythm: regular, Rate: normal    Pulmonary   Breath sounds clear to auscultation, Decreased breath sounds    Other Findings        Anesthesia Plan  ASA Score- 4     Anesthesia Type- general with ASA Monitors. Additional Monitors:     Airway Plan:     Comment: Trach in situ. Plan Factors-Exercise tolerance (METS): >4 METS. Chart reviewed. Existing labs reviewed. Patient summary reviewed. Patient is not a current smoker. Induction- intravenous. Postoperative Plan-     Informed Consent- Anesthetic plan and risks discussed with patient. I personally reviewed this patient with the CRNA. Discussed and agreed on the Anesthesia Plan with the CRNA. Rani Calvo

## 2023-10-19 NOTE — QUICK NOTE
IR Quick Note:    Upon transfer to IR suite, Ms. Ross was noted to have oxygen saturations of approximately 70-76% on 55% O2. This improved to the low 80s with 100% non-rebreather placed over the tracheostomy. After sitting upright, she gradually improved to low 90s with 100% O2. She was tired but communicative, oriented, and provided consent to tunneled central venous catheter placement. Discussions were had regarding appropriateness of tunneled CVC placement under sedation versus PICC with local anesthetic only given poor oxygen saturations. Ultimately consensus was to proceed with anesthesiology assistance for sedation and ventilation.     Taniya Jameson MD [facility]  Interventional Radiology

## 2023-10-19 NOTE — SEDATION DOCUMENTATION
Tunneled central line placed in the right IJ by Dr. Guerrero Hansen. Exofin, sutures and CHG dressing to site. Both lumens capped, flushed, and with blood return noted. Patient tolerated well. See anesthesia charting for vital signs.

## 2023-10-19 NOTE — BRIEF OP NOTE (RAD/CATH)
INTERVENTIONAL RADIOLOGY PROCEDURE NOTE    Date: 10/19/2023    Procedure:   Procedure Summary       Date:  Room / Location:     Anesthesia Start:  Anesthesia Stop:     Procedure:  Diagnosis:     Scheduled Providers:  Responsible Provider:     Anesthesia Type: Not recorded ASA Status: Not recorded            Preoperative diagnosis:   1. Large cell lymphoma ([address])    2. Angioedema, subsequent encounter    3. Acute respiratory failure with hypoxia (HCC)    4. PNA (pneumonia)    5. Oropharyngeal mass    6. Pneumonia of right middle lobe due to infectious organism    7. RML pneumonia    8. Chemotherapy induced neutropenia (HCC)         Postoperative diagnosis: Same. Surgeon: Izabel Kim MD     Assistant: None. No qualified resident was available. Blood loss: Minimal    Specimens: None     Findings:   Very compressible, slit-like right internal jugular vein, which may reflect intravascular volume depletion. Successful placement of 6Fr right chest dual-lumen tunneled central venous catheter via the RIJV. Catheter terminates at the superior cavoatrial junction and is ready for immediate use. Complications: None immediate.     Anesthesia: MAC sedation

## 2023-10-19 NOTE — RESPIRATORY THERAPY NOTE
Pt transported to IR with venturi 55%, 14L. Pt started desating to low 80s, high 70s. Needed to be placed on a non-rebreather. Pt started sating higher 90s when sitting upright. Waited for anesthesia to assist with procedure.

## 2023-10-19 NOTE — ANESTHESIA POSTPROCEDURE EVALUATION
Post-Op Assessment Note    CV Status:  Stable  Pain Score: 0    Pain management: adequate     Mental Status:  Alert and awake   Hydration Status:  Euvolemic and stable   PONV Controlled:  Controlled   Airway Patency:  Patent and adequate      Post Op Vitals Reviewed: Yes      Staff: CRNA         No notable events documented.     BP   119/75   Temp      Pulse 65   Resp 20   SpO2 95% nonrebreather 15L via trach

## 2023-10-19 NOTE — PROGRESS NOTES
Medical Oncology/Hematology Progress Note  Keven Dwyer, female, 77 y. 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 CT AP with no lymphadenopathy; Brain MRI no lesion; stage II Large B Cell Lymphoma. She is also s/p IR gastrostomy tube placement on 9/27/23. She started her dose-adjusted systemic treatment, R-EPOCH, on 9/22/23 with the last dose on 9/25/23. She received Rituxan infusion on 9/27/23. Her CMP and uric acid have been unremarkable for tumor lysis. CT Soft Tissue Neck (10/16/23) showed improving multi spatial mass centered in the left nasopharynx/oropharynx. Again the mass extends up to the skull base in the infratemporal fossa with loss of perineural fat at the left foramen ovale. Decreased mass effect on the airway. She is on Cycle 2 of EPOCH-R that started on 10/13/23. She was scheduled for Day 6, Cycle 2 of rituximab on 10/17/23, but was held due to PICC line placement issues. She gave permission to continue with treatment for Cytoxan and rituximab. IR consulted for central line placement. Patient continues to be on cuffed trach, respiratory status improving per ICU team.     Assessment and Plan  1. 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 Today (10/17/23) slated for rituximab, immunotherapy. Will hold treatment for today. -S/p Cycle 1 EPOCH (etoposidevincristine, cyclophosphamide, doxorubicin) on 9/25/23, and rituxamab on 9/27/23. Started with growth factor on 9/28/23. Filgrastim (Neupogen) ordered at 5 mg/kg (375 mg) rounded to 300 mcg. Discontinued as of 10/6/23.    2. Left Jugular Lymphadenopathy     3.  Leukocytosis   - WBC 10.60 (10/19) < 6.42 < 6.29 < 10.02 (10/16) < 15.56 < 17.32 (10/12)   - ANC 17.01 (10/14) < 11.26 (10/12) <10.18 <  9.44 < 8.09 < 0.94 < 0.12 < 0.02 < 0.00  -Continues to be afebrile  -Started with growth factor, Neupogen, on 9/28/23, d/c on 10/6/23  from cycle 1.    4. Acute Thrombocytosis  Platelet trend: 522 < 730  (10/17) <696 (10/16) < 460 (10/14) < 331 (10/13) normal   -Platelets normal on 1160/96. Most likely reactive  -On DVT ppx, Lovenox 40 mg daily    PLAN:  -Plan to proceed with chemo treatment for cyclophosphamide today and immunotherapy (rituximab) for tomorrow, Friday, 10/20/23. Discussed with ICU team.  -Will start growth factor (filgrastim) on 10/20/23, 24 hours after infusion of cyclophosphamide. -CMP and CBC with differential, uric acid daily.   -Transfuse for hemoglobin <7 g/dL. Interval Hx:  Patient anxious to proceed with chemotherapy. Patient was a bit upset yesterday about the delay of the chemo, but understood once further explanation was offered. However, she was in really good spirits this morning. She ws able to offer a big smile when approached. Labs:  CMP (10/19/23)  Sodium 137 < 133  Corrected calcium 10.4 < 10.3 < 10.4 < 10.4  Potassium 4.3 < 4.0  < 4.2   Creatinine 0.82 < 0.80  Phosphorus 4.1   Uric acid 4.7 < 4.5 < 4.4    CBC  Hemoglobin 9.4 (10/19) < 8.9 < 9.4 < 9.3 < 8.4 < 7.6 (10/12) <8.3   Platelets 533 (38/07) < 774 < 730 < 696 < 576 < 460 < 234 (10/12)    Imaging:  CT Soft Tissue Neck (10/16/23) - Improving multi spatial mass centered in the left nasopharynx/oropharynx. Again the mass extends up to the skull base in the infratemporal fossa with loss of perineural fat at the left foramen ovale. Decreased mass effect on the airway. No adenopathy by size criteria. Bilateral effusions and extensive airspace disease is stable. 9/30/23 CT PE study Abdomen/pelvis- Hilar and mediastinal lymphadenopathy similar to prior. No acute findings in the abdomen or pelvis.  Chronic lytic lesion in T12 vertebral body gradually progressing since 2013 with chronic pathologic fracture. 9/13/23 CTA Chest Rt middle lobe consolidation (PNA), slight regression of previous hilar and mediastinal LNs (had recent PNA in August 2023)      9/14/23 CT Soft Tissue Neck w contrast: soft tissue mass ~ 6.1 x 4.7 x 5.2 cm appears to be centered within Lt oropharynx involving multiple spaces and extends into the nasopharynx. .. multiple small jugular chain nodes on the left. 9/17/23 nasopharynx mass biopsy initial results positive for malignancy favor poorly differentiated carcinoma. Cannot exclude lymphoma. Flow cytometry pending. Please do not hesitate to reach out for questions or concerns. Jhonbeulah Weissrios, [address], [address] [facility]  Hematology-Oncology    Chemotherapy HX:  Started growth factor on 9/28/23 with last day on 10/6/23. WBC decreased at 0.21 with absolute neutrophil count at 0.00 on 10/2/23. Commenced with neutropenic precautions. Patient continued to afebrile then, no complaints of bone pain from growth factor injections since 9/28/23. Review of Systems   Constitutional:  Positive for fatigue. Negative for chills, diaphoresis and fever. HENT:  Negative for ear pain and sore throat. Neck mass    Eyes:  Negative for pain and visual disturbance. Respiratory:  Negative for cough, chest tightness, shortness of breath and wheezing. Cardiovascular:  Negative for chest pain and palpitations. Gastrointestinal:  Negative for abdominal pain, blood in stool, diarrhea, nausea and vomiting. Genitourinary:  Negative for difficulty urinating, dysuria and hematuria. Musculoskeletal:  Negative for arthralgias and back pain. Skin:  Negative for color change and rash. Neurological:  Positive for weakness. Negative for dizziness, seizures, syncope and headaches. Psychiatric/Behavioral:  Negative for agitation and decreased concentration. The patient is nervous/anxious. All other systems reviewed and are negative.     Objective: Medication Administration - last 24 hours from 10/18/2023 1109 to 10/19/2023 1109         Date/Time Order Dose Route Action Action by     10/19/2023 0910 EDT chlorhexidine (PERIDEX) 0.12 % oral rinse 15 mL 15 mL Mouth/Throat Given Manus Murders, RN     10/18/2023 2037 EDT chlorhexidine (PERIDEX) 0.12 % oral rinse 15 mL 15 mL Mouth/Throat Given Yasmin Mcqueen, RN     10/19/2023 0108 EDT busPIRone (BUSPAR) tablet 30 mg 30 mg Oral Given Manus Murders, RN     10/18/2023 2039 EDT busPIRone (BUSPAR) tablet 30 mg 30 mg Oral Given Yasmin Charisse, RN     10/18/2023 1907 EDT busPIRone (BUSPAR) tablet 30 mg 30 mg Oral Given Manus Murders, RN     10/19/2023 5744 EDT gabapentin (NEURONTIN) capsule 300 mg 300 mg Oral Given Manus Murders, RN     10/18/2023 2037 EDT gabapentin (NEURONTIN) capsule 300 mg 300 mg Oral Given Yasmin Charleston, RN     10/18/2023 1831 EDT gabapentin (NEURONTIN) capsule 300 mg 300 mg Oral Given Manus Murders, RN     10/18/2023 2145 EDT senna oral syrup 8.8 mg 8.8 mg Per NG Tube Not Given Yasmin Charisse, RN     10/19/2023 5198 EDT famotidine (PEPCID) tablet 20 mg 20 mg Oral Given Manus Murders, RN     10/18/2023 1825 EDT famotidine (PEPCID) tablet 20 mg 20 mg Oral Given Manus Murders, RN     10/19/2023 7448 EDT sertraline (ZOLOFT) tablet 75 mg 75 mg Per PEG Tube Given Manus Murders, RN     10/18/2023 2043 EDT QUEtiapine (SEROquel) tablet 50 mg 50 mg Oral Given Yasmin Charisse, RN     10/19/2023 0911 EDT nicotine (NICODERM CQ) 7 mg/24hr TD 24 hr patch 7 mg 7 mg Transdermal Patch Removed Manus Murders, RN     10/19/2023 0900 EDT nicotine (NICODERM CQ) 7 mg/24hr TD 24 hr patch 7 mg 7 mg Transdermal Patch Removed Bobby Lee RN     10/19/2023 0911 EDT enoxaparin (LOVENOX) subcutaneous injection 40 mg 40 mg Subcutaneous Given Bobby Lee RN     10/19/2023 0757 EDT levalbuterol Shelia Jon) inhalation solution 1.25 mg 1.25 mg Nebulization Given RT Марина     10/18/2023 2001 EDT levalbuterol Shelia Jon) inhalation solution 1.25 mg 1.25 mg Nebulization Given Ada Essex, RT     10/18/2023 1339 EDT levalbuterol (XOPENEX) inhalation solution 1.25 mg 1.25 mg Nebulization Given Royal Davidson, RT     10/19/2023 0757 EDT ipratropium (ATROVENT) 0.02 % inhalation solution 0.5 mg 0.5 mg Nebulization Given Jerel Burrell, RT     10/18/2023 2001 EDT ipratropium (ATROVENT) 0.02 % inhalation solution 0.5 mg 0.5 mg Nebulization Given Ada Essex, RT     10/18/2023 1339 EDT ipratropium (ATROVENT) 0.02 % inhalation solution 0.5 mg 0.5 mg Nebulization Given Royal Davidson, RT     10/19/2023 0354 EDT oxyCODONE (ROXICODONE) oral solution 5 mg 5 mg Oral Not Given Chris Erwin, RN     10/18/2023 2039 EDT oxyCODONE (ROXICODONE) oral solution 5 mg 5 mg Oral Given Chris Erwin, RN     10/18/2023 1327 EDT oxyCODONE (ROXICODONE) oral solution 5 mg 5 mg Oral Given Kimberly Yokasta, RN     10/19/2023 0911 EDT levofloxacin (LEVAQUIN) tablet 250 mg 250 mg Oral Given Chappell Hawking, RN     10/19/2023 0910 EDT fluconazole (DIFLUCAN) tablet 200 mg 200 mg Oral Given Chappell Hawking, RN     10/19/2023 0912 EDT acyclovir (ZOVIRAX) capsule 400 mg 400 mg Oral Given Chappell Hawking, RN     10/18/2023 1825 EDT acyclovir (ZOVIRAX) capsule 400 mg 400 mg Oral Given Chappell Hawking, RN     10/19/2023 0911 EDT amLODIPine (NORVASC) tablet 10 mg 10 mg Oral Given Chappell Hawking, RN     10/19/2023 0910 EDT metoprolol tartrate (LOPRESSOR) tablet 25 mg 25 mg Oral Given Chappell Arcenio, RN     10/18/2023 2037 EDT metoprolol tartrate (LOPRESSOR) tablet 25 mg 25 mg Oral Given Chris Erwin, RN     10/18/2023 2043 EDT melatonin tablet 3 mg 3 mg Oral Given Chris Erwin RN     10/19/2023 0901 EDT sodium chloride 0.9 % bolus 2,000 mL 2,000 mL Intravenous [address] Ta Rg RN     10/19/2023 1044 EDT lidocaine-epinephrine 1%-1:744028 buffered 5 mL Infiltration Given Azucena Rodarte MD     10/19/2023 1034 EDT lidocaine-epinephrine 1%-1:699171 buffered 5 mL Infiltration Given Asiya Garza Emily Kaufman MD            /64 (BP Location: Left arm)   Pulse 101   Temp 99 °F (37.2 °C) (Oral)   Resp 22   Ht 5' 0.63" (1.54 m)   Wt 78.5 kg (173 lb 1 oz)   SpO2 94%   BMI 33.10 kg/m²       Physical Exam  Constitutional:       Appearance: She is not ill-appearing. HENT:      Head: Normocephalic and atraumatic. Comments: Trach cuff in place     Right Ear: External ear normal.      Left Ear: External ear normal.      Nose: No congestion or rhinorrhea. Mouth/Throat:      Mouth: Mucous membranes are moist.   Eyes:      Extraocular Movements: Extraocular movements intact. Conjunctiva/sclera: Conjunctivae normal.      Pupils: Pupils are equal, round, and reactive to light. Neck:      Comments: cuffed Karenley XLT #7 on 10/3/23  Cardiovascular:      Rate and Rhythm: Regular rhythm. Tachycardia present. Pulses: Normal pulses. Heart sounds: No murmur heard. No gallop. Pulmonary:      Effort: Pulmonary effort is normal. No respiratory distress. Breath sounds: No wheezing, rhonchi or rales. Comments: Vented  Abdominal:      General: Bowel sounds are normal. There is no distension. Palpations: There is no mass. Tenderness: There is no abdominal tenderness. There is no guarding. Comments: Peg tube in place, on tube feeding   Musculoskeletal:      Right lower leg: No edema. Left lower leg: No edema. Skin:     General: Skin is warm. Capillary Refill: Capillary refill takes less than 2 seconds. Coloration: Skin is not jaundiced or pale. Findings: No rash. Neurological:      General: No focal deficit present. Mental Status: She is alert and oriented to person, place, and time. Psychiatric:         Behavior: Behavior normal.         Thought Content:  Thought content normal.         Judgment: Judgment normal.      Comments: In good spirits         Recent Results (from the past 48 hour(s))   Comprehensive metabolic panel    Collection Time: 10/18/23  5:42 AM   Result Value Ref Range    Sodium 133 (L) 135 - 147 mmol/L    Potassium 4.3 3.5 - 5.3 mmol/L    Chloride 96 96 - 108 mmol/L    CO2 29 21 - 32 mmol/L    ANION GAP 8 mmol/L    BUN 49 (H) 5 - 25 mg/dL    Creatinine 0.89 0.60 - 1.30 mg/dL    Glucose 135 65 - 140 mg/dL    Calcium 9.6 8.4 - 10.2 mg/dL    Corrected Calcium 10.4 (H) 8.3 - 10.1 mg/dL    AST 19 13 - 39 U/L    ALT 34 7 - 52 U/L    Alkaline Phosphatase 102 34 - 104 U/L    Total Protein 5.6 (L) 6.4 - 8.4 g/dL    Albumin 3.0 (L) 3.5 - 5.0 g/dL    Total Bilirubin 0.35 0.20 - 1.00 mg/dL    eGFR 65 ml/min/1.73sq m   CBC and differential    Collection Time: 10/18/23  5:42 AM   Result Value Ref Range    WBC 6.42 4.31 - 10.16 Thousand/uL    RBC 3.00 (L) 3.81 - 5.12 Million/uL    Hemoglobin 8.9 (L) 11.5 - 15.4 g/dL    Hematocrit 27.8 (L) 34.8 - 46.1 %    MCV 93 82 - 98 fL    MCH 29.7 26.8 - 34.3 pg    MCHC 32.0 31.4 - 37.4 g/dL    RDW 14.6 11.6 - 15.1 %    MPV 9.6 8.9 - 12.7 fL    Platelets 196 (H) 778 - 390 Thousands/uL   Uric acid    Collection Time: 10/18/23  5:42 AM   Result Value Ref Range    Uric Acid 4.7 2.0 - 7.5 mg/dL   LD,Blood    Collection Time: 10/18/23  5:42 AM   Result Value Ref Range     140 - 271 U/L   Manual Differential(PHLEBS Do Not Order)    Collection Time: 10/18/23  5:42 AM   Result Value Ref Range    Segmented % 94 (H) 43 - 75 %    Lymphocytes % 5 (L) 14 - 44 %    Monocytes % 1 (L) 4 - 12 %    Eosinophils, % 0 0 - 6 %    Basophils % 0 0 - 1 %    Absolute Neutrophils 6.03 1.85 - 7.62 Thousand/uL    Lymphocytes Absolute 0.32 (L) 0.60 - 4.47 Thousand/uL    Monocytes Absolute 0.06 0.00 - 1.22 Thousand/uL    Eosinophils Absolute 0.00 0.00 - 0.40 Thousand/uL    Basophils Absolute 0.00 0.00 - 0.10 Thousand/uL    Total Counted      RBC Morphology Present     Platelet Estimate Increased (A) Adequate    Anisocytosis Present    CBC and differential    Collection Time: 10/19/23  4:47 AM   Result Value Ref Range    WBC 10.60 (H) 4.31 - 10.16 Thousand/uL    RBC 3.07 (L) 3.81 - 5.12 Million/uL    Hemoglobin 9.4 (L) 11.5 - 15.4 g/dL    Hematocrit 29.0 (L) 34.8 - 46.1 %    MCV 95 82 - 98 fL    MCH 30.6 26.8 - 34.3 pg    MCHC 32.4 31.4 - 37.4 g/dL    RDW 14.9 11.6 - 15.1 %    MPV 9.6 8.9 - 12.7 fL    Platelets 407 (H) 958 - 390 Thousands/uL   Manual Differential(PHLEBS Do Not Order)    Collection Time: 10/19/23  4:47 AM   Result Value Ref Range    Segmented % 85 (H) 43 - 75 %    Bands % 1 0 - 8 %    Lymphocytes % 11 (L) 14 - 44 %    Monocytes % 1 (L) 4 - 12 %    Eosinophils, % 2 0 - 6 %    Basophils % 0 0 - 1 %    Absolute Neutrophils 9.12 (H) 1.85 - 7.62 Thousand/uL    Lymphocytes Absolute 1.17 0.60 - 4.47 Thousand/uL    Monocytes Absolute 0.11 0.00 - 1.22 Thousand/uL    Eosinophils Absolute 0.21 0.00 - 0.40 Thousand/uL    Basophils Absolute 0.00 0.00 - 0.10 Thousand/uL    Total Counted      RBC Morphology Normal     Platelet Estimate Increased (A) Adequate   Comprehensive metabolic panel    Collection Time: 10/19/23  4:48 AM   Result Value Ref Range    Sodium 137 135 - 147 mmol/L    Potassium 4.1 3.5 - 5.3 mmol/L    Chloride 99 96 - 108 mmol/L    CO2 30 21 - 32 mmol/L    ANION GAP 8 mmol/L    BUN 52 (H) 5 - 25 mg/dL    Creatinine 1.22 0.60 - 1.30 mg/dL    Glucose 91 65 - 140 mg/dL    Calcium 9.5 8.4 - 10.2 mg/dL    Corrected Calcium 10.3 (H) 8.3 - 10.1 mg/dL    AST 14 13 - 39 U/L    ALT 27 7 - 52 U/L    Alkaline Phosphatase 93 34 - 104 U/L    Total Protein 5.7 (L) 6.4 - 8.4 g/dL    Albumin 3.0 (L) 3.5 - 5.0 g/dL    Total Bilirubin 0.59 0.20 - 1.00 mg/dL    eGFR 44 ml/min/1.73sq m   Uric acid    Collection Time: 10/19/23  4:48 AM   Result Value Ref Range    Uric Acid 5.8 2.0 - 7.5 mg/dL   LD,Blood    Collection Time: 10/19/23  4:48 AM   Result Value Ref Range     140 - 271 U/L       US right upper quadrant    Result Date: 9/22/2023  Narrative: RIGHT UPPER QUADRANT ULTRASOUND INDICATION:     CT finding N/V +Cancino.  COMPARISON: CT abdomen/pelvis 9/21/2023 TECHNIQUE:   Real-time ultrasound of the right upper quadrant was performed with a curvilinear transducer with both volumetric sweeps and still imaging techniques. FINDINGS: Evaluation limited as per sonographer's note in PACS. PANCREAS:  Visualized portions of the pancreas are within normal limits. AORTA AND IVC:  Visualized portions are normal for patient age. LIVER: Size:  Within normal range. The liver measures 15.3 cm in the midclavicular line. Contour:  Surface contour is smooth. Parenchyma:  Echogenicity and echotexture are within normal limits. No liver mass identified. Limited imaging of the main portal vein shows it to be patent and hepatopetal. BILIARY: The gallbladder is normal in caliber. Gallbladder wall thickness upper limits of normal. No pericholecystic fluid. There is gallbladder sludge without evidence for shadowing calculi. No sonographic Cancino sign. No intrahepatic biliary dilatation. CBD measures 4.0 mm. No choledocholithiasis. KIDNEY: Right kidney measures 11.9 x 5.2 x 6.4 cm. Volume 207.6 mL Several simple cysts, the largest of which measures 4.1 cm. 2.8 cm septated cyst in the upper pole. No hydronephrosis or perinephric collection. ASCITES:   None. Impression: No cholelithiasis. Gallbladder sludge is present with wall thickness upper limits of normal. Negative sonographic Cancino sign. Findings may be sequela of chronic gallbladder dysfunction. Consider follow-up with a HIDA scan if it would alter patient management. Workstation performed: [id number]     CT abdomen pelvis w contrast    Result Date: 9/21/2023  Narrative: CT ABDOMEN AND PELVIS WITH IV CONTRAST INDICATION:   Head/neck cancer, staging lymohoma staging prior to treatment with chemo. COMPARISON: 9/13/2023. TECHNIQUE:  CT examination of the abdomen and pelvis was performed. Multiplanar 2D reformatted images were created from the source data.  This examination, like all CT scans performed in the [place]. [address], was performed utilizing techniques to minimize radiation dose exposure, including the use of iterative reconstruction and automated exposure control. Radiation dose length product (DLP) for this visit:  813 mGy-cm IV Contrast:  100 mL of iohexol (OMNIPAQUE) Enteric Contrast:  Enteric contrast was not administered. FINDINGS: Mildly degraded by respiratory motion. ABDOMEN LOWER CHEST: There is bibasilar atelectasis. LIVER/BILIARY TREE:  Unremarkable. GALLBLADDER:  No calcified gallstones. There may be mild gallbladder wall thickening though evaluation is degraded by respiratory motion. No surrounding inflammatory changes. SPLEEN:  Unremarkable. PANCREAS:  Unremarkable. ADRENAL GLANDS:  Unremarkable. KIDNEYS/URETERS: Multiple bilateral renal cysts. No hydronephrosis. STOMACH AND BOWEL: There is colonic diverticulosis without evidence of acute diverticulitis. APPENDIX:  No findings to suggest appendicitis. ABDOMINOPELVIC CAVITY:  No ascites. No pneumoperitoneum. No lymphadenopathy. VESSELS:  Unremarkable for patient's age. PELVIS REPRODUCTIVE ORGANS:  Unremarkable for patient's age. URINARY BLADDER:  Unremarkable. ABDOMINAL WALL/INGUINAL REGIONS:  Fat containing right inguinal hernia noted. Fat-containing umbilical hernia. OSSEOUS STRUCTURES: Again seen is a destructive lytic lesion in the T12 vertebral body with sclerotic borders, progressed from 2013 and with chronic appearing pathologic fracture. Impression: 1. No abdominal lymphadenopathy. 2.  Question gallbladder wall thickening versus motion artifact. If there is clinical concern for gallbladder pathology, ultrasound is recommended. 3.  Chronic T12 lytic lesion with pathologic fracture.  Workstation performed: [id number]     MRI soft tissue neck wo and w contrast    Result Date: 9/21/2023  Narrative: MRI OF THE SOFT TISSUES OF THE NECK - WITH AND WITHOUT CONTRAST INDICATION: Oropharyngeal mass s/p biopsy (+) for carcinoma COMPARISON: Neck CT from 9/14/2023 TECHNIQUE:  Multiplanar, multisequence imaging of the soft tissues of the neck was performed before and after gadolinium administration. . IV Contrast:  7.5 mL of Gadobutrol injection (SINGLE-DOSE) IMAGE QUALITY: Motion degrades images. FINDINGS: VISUALIZED BRAIN PARENCHYMA: No acute or suspicious findings. Please see today's brain MR report. VISUALIZED ORBITS AND PARANASAL SINUSES: Globes and orbits are within normal limits. Pan paranasal sinus mucosal thickening, greatest involving ethmoids and sphenoids. NASAL CAVITY, NASOPHARYNX, and OROHYPOPHARYNX and LARYNX: Approximately 7.5 x 4.0 x 7.8 cm (length X depth X width) soft tissue mass, epicenter likely the left lateral pharyngeal recess. Enhances and restricts diffusion. Central, irregular fluid signal intensity area without enhancement appears contiguous with fluid around an endotracheal tube, likely trapped secretions and circumferential mass. Mass extends from the left greater than right nasopharynx superiorly to the cricoid cartilage inferiorly. Extends to the posterior nasal cavity. Displaces the soft palate, uvula and tongue base anteriorly. Oral cavity is otherwise within normal limits. Displaces the left pterygoid muscles anterolaterally. Extends posterior to the angle of the mandible approximately 1.4 cm, abutting and mildly laterally displacing the deep parotid, inseparable from the gland. Effaces the left parapharyngeal fat. Discrete epiglottis not seen, grossly anteriorly displaced. Extends along the left aryepiglottic fold and posterior pharyngeal wall to the to the inferior supraglottic airway, grossly terminating just above or at the left false cord. Extends to the left carotid space, encircles the carotid with severe narrowing and posterior-lateral displacement of the distal cervical and most proximal petrous vessel. Otherwise preserved left internal carotid flow-void.  Posterior-lateral displacement  and occlusion of the internal jugular vein at the level of the angle of the mandible in the distal neck. Mass extends to the proximal jugular foramen. Laryngeal ventricles and true cords appear preserved. Normal fat signal  preserved in the cricoid and thyroid cartilages. Enteric and endotracheal tubes are displaced rightward. Trace retropharyngeal effusion. THYROID GLAND:   Within normal limits. PAROTID AND SUBMANDIBULAR GLANDS: Both submandibular and the right parotid glands are within normal limits. Deep left parotid involvement mentioned above. LYMPH NODES: Similar small jugular chain nodes more numerous on the left than the right, but none considered pathologically enlarged. 0.7 x 0.5 cm suspicious right retropharyngeal node (4/27). Left retropharyngeal space is obliterated by mass, no separate adenopathy. VASCULAR STRUCTURES: Left carotid a jugular involvement described above. Right carotid artery and jugular veins are within normal limits. THORACIC INLET: Similar to CT. Dependent lung opacities. CERVICAL SPINE: Normal appearance. OTHER: Left greater than right mastoid effusions with patchy left enhancement and restricted diffusion were described in today's brain MR report. Asymmetric opacity involves the left petrous apex. Normal appearance of the IACs and inner ear structures. No cerebellopontine angle mass. Impression: Known, large left neck mass described in detail above. Workstation performed: [id number]     MRI brain w wo contrast    Result Date: 9/21/2023  Narrative: MRI BRAIN WITHOUT AND WITH CONTRAST INDICATION:  Oropharyngeal mass s/p biopsy (+) for carcinoma . COMPARISON: 6/17/2017 CT of the brain TECHNIQUE:  Multiplanar/multisequence MRI of the brain was performed administration of contrast. IV Contrast:  7.5 mL of Gadobutrol injection (SINGLE-DOSE) IMAGE QUALITY:  Diagnostic. FINDINGS: BRAIN PARENCHYMA: No  hemorrhage, extra-axial fluid collection, acute infarct, mass effect or midline shift.  Mild, focal patchy periventricular and subcortical white matter foci of abnormal T2 and FLAIR hyperintensity are nonspecific, but usually secondary to changes of microangiopathy. Small developmental venous angioma in the left posterior frontal lobe, typically clinically insignificant. No suspicious enhancement or masses. VENTRICLES:  Within normal limits for age. SELLA AND PITUITARY GLAND:  Within normal limits. ORBITS:  Within normal limits. PARANASAL SINUSES: Mucosal thickening. VASCULATURE: Preserved skull base flow voids. Normal enhancement. CALVARIUM AND SKULL BASE: Bilateral mastoid effusions are likely secondary mass, related to eustachian tube obstruction from nasopharyngeal mass. Patient is also intubated. Small mucosal retention cyst at the right fossa of [place]. Small ill-defined foci of enhancement in the superior mastoid and asymmetric left mastoid restricted diffusion. No coalescence or discrete mass. Skull and visualized cervical spine are within normal limits. EXTRACRANIAL SOFT TISSUES:  A nasopharyngeal mass will be described in further detail on today's neck MR report. Impression: No evidence of brain metastases. 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 Asymmetric patchy enhancement and restricted diffusion in the left mastoid. The differential includes neoplastic involvement and infection. Workstation performed: [id number]     XR chest portable    Result Date: 9/20/2023  Narrative: CHEST INDICATION:   NG tube placement. COMPARISON: 9/17/2023 EXAM PERFORMED/VIEWS:  XR CHEST PORTABLE FINDINGS: Tracheostomy tube is in stable position. Distal portions of nasogastric tube are seen below the hemidiaphragm within the left upper abdomen. The tip of the tube extends beyond the inferior margin of this study. Heart shadow is enlarged but unchanged from prior exam. There is mild pulmonary vascular congestion. The left costophrenic angle is obscured.  Hazy opacities are additionally noted within the right costophrenic angle. No evidence of acute osseous abnormality. Impression: 1. Nasogastric tube is seen with its distal portions within the stomach. The tip of the tube courses beyond the inferior margin of the study. 2. Pulmonary vascular congestion with obscuration of the left hemidiaphragm. This is stable from prior radiograph and may represent small left effusion versus consolidation. 3. Right basilar atelectasis versus trace right effusion. Workstation performed: [id number]     XR chest portable    Result Date: 9/18/2023  Narrative: CHEST INDICATION:   Assess. COMPARISON:  None EXAM PERFORMED/VIEWS:  XR CHEST PORTABLE Images: 2 FINDINGS: Tracheostomy tube is seen in appropriate position. A feeding tube is seen extending down below the dome of the diaphragm Cardiomegaly seen Increased lung markings seen suggest congestion left base is obscured Osseous structures appear within normal limits for patient age. Impression: Increased lung markings seen suggest congestion. The left base is obscured No worsening Workstation performed: [id number]     CT soft tissue neck w contrast    Result Date: 9/14/2023  Narrative: CT NECK WITH CONTRAST INDICATION:   Laryngeal edema COMPARISON:  None. TECHNIQUE:  Axial, sagittal, and coronal 2D reformatted images were created from the axial source data and submitted for interpretation. Radiation dose length product (DLP) for this visit:  769 mGy-cm . This examination, like all CT scans performed in the [facility], was performed utilizing techniques to minimize radiation dose exposure, including the use of iterative reconstruction and automated exposure control. IV Contrast:  85 mL of iohexol (OMNIPAQUE) IMAGE QUALITY:  Diagnostic. FINDINGS: VISUALIZED BRAIN PARENCHYMA:  No acute intracranial pathology of the visualized brain parenchyma. VISUALIZED ORBITS: Normal visualized orbits.  PARANASAL SINUSES: Normal visualized paranasal sinuses. Nasopharynx, oropharynx AND HYPOPHARYNX: There is a large heterogeneously enhancing mass identified within the neck involving multiple spaces. The origin is difficult to ascertain given the large size. This mass measures approximately 6.1 x 4.7 x 5.2 cm and appears to be centered within the left oropharynx. The mass extends superiorly into the nasopharynx left more so than right and into the posterior aspect of the nasal cavity. The mass also extends across the midline within the oropharynx and inferiorly into the left lateral oropharynx but not below the laryngeal ventricle. The mass extends laterally into the infratemporal fossa and parapharyngeal fat and is inseparable from infratemporal musculature and deep lobe of the parotid gland. There is extension into the prevertebral space where the mass is inseparable from the anterior paraspinal muscle on the left and posteriorly and laterally into the carotid space where the mass is displacing and inseparable from jugular and carotid. The mass encases the cervical internal carotid artery just below the level of the skull base resulting in narrowing of the vessel and the mass compresses and occludes the jugular vein below the skull base. The vessel does reconstitute via collateral vessels as it  extends inferiorly within the neck. The mass extends superiorly into the skull base inseparable from the carotid canal and jugular foramen. There is severe airway compromise within the posterior oral cavity and superior oropharynx. ET tube and OG tube are present. THYROID GLAND:  Unremarkable. PAROTID AND SUBMANDIBULAR GLANDS: Normal parotid and submandibular glands other than the mass abutting the deep lobe of the left parotid gland. LYMPH NODES: Multiple small jugular chain nodes are seen on the left.  VASCULAR STRUCTURES: As described above the mass partially encases the cervical internal carotid artery, narrowing the vessel and occludes the jugular vein from the skull base to the mid neck. Below this the vessel is unremarkable due to collateral reconstitution. THORACIC INLET: Posterior left upper lobe consolidation may represent atelectasis or pneumonia. Mild patchy groundglass opacity within the upper lung fields. BONY STRUCTURES: At T1-2 there is a left paramedian bridging osteophyte resulting in mild to moderate left anterior lateral canal stenosis. Impression: Large enhancing soft tissue mass identified within the left neck involving multiple spaces. This appears to be centered within the left oropharynx with extensions as described above into multiple spaces. This results in significant airway compromise. 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 Small level 3 and level 4 nodes are seen within the neck. I personally discussed this study with ICU resident on 9/14/2023 4:44 PM. Workstation performed: [id number]     Bronchoscopy    Result Date: 9/14/2023  Narrative: Table formatting from the original result was not included. [address] [place] [address] [phone] DATE OF SERVICE: 9/14/23 PHYSICIAN(S): Attending: No Staff Documented Fellow: No Staff Documented INDICATION: Acute respiratory failure with hypoxia ([address]) POST-OP DIAGNOSIS: See the impression below. PREPROCEDURE: Standard airway preparation completed per respiratory therapy protocol. Informed consent was obtained. Images reviewed prior to the procedure. A Time Out was performed. No suspicion or identified risk for TB or other airborne infectious disease; bronchoscopy procedure being performed for diagnostic purposes. PROCEDURE: Bronchoscopy DETAILS OF PROCEDURE: Patient was taken to the procedure room where a time out was performed to confirm correct patient and correct procedure. The patient was already under sedation prior to the procedure.  The patient's blood pressure, ECG, heart rate, level of consciousness, respirations and oxygen were monitored throughout the procedure. The patient experienced no blood loss. The scope was introduced through the endotracheal tube. The procedure was moderately difficult due to patient intolerance and persistent coughing. In response to procedure difficulty, deeper sedation was employed. The patient tolerated the procedure well. There were no apparent adverse events. ANESTHESIA INFORMATION: ASA: ASA status not filed in the log. Anesthesia Type: Anesthesia type not filed in the log. FINDINGS: The lower trachea, main sujata, left main stem, KARTHIK, lingula, LLL, right main stem, RUL, bronchus intermedius, RML and RLL appeared normal. Left lower lung zone with no endobronchial lesions, left upper and lingula both appear normal Right upper, right middle and right lower lobes all appeared normal with no endobronchial lesions Endotracheal tube was retracted 3 cm under direct visualization with the bronchoscope Bronchoalveolar lavage was performed x1 in the right lateral segment (RB4) with 60 mL of saline instilled and a total return of 40 mL; the fluid appeared cloudy SPECIMENS: ID Type Source Tests Collected by Time Destination 1 :  Lavage Lung, Right Middle Lobe Bronchoalveolar Lavage PULMONARY CYTOLOGY Kali Bedoya MD 9/14/2023 12:55 PM  A :  Bronchial Lung, Right Middle Lobe Bronchoalveolar Lavage FUNGAL CULTURE, VIRUS CULTURE, BRONCHIAL CULTURE AND GRAM STAIN, LEGIONELLA CULTURE, PNEUMOCYSTIS SMEAR BY DFA ([facility]), AFB CULTURE WITH STAIN Kali Bedoya MD 9/14/2023 12:57 PM       Impression: BAL of the right middle lobe Endotracheal tube was retracted under direct visualization Tongue still appears swollen, vocal cords visualized outside of the endotracheal tube with the bronchoscope, no significant edema or mass noted RECOMMENDATION:  Follow-up infectious work-up      XR chest 1 view portable    Result Date: 9/13/2023  Narrative: CHEST INDICATION:   post intubation.  COMPARISON: Same day chest radiograph and subsequent same day CT chest. Chest x-ray 8/27/2023. EXAM PERFORMED/VIEWS:  XR CHEST PORTABLE FINDINGS: Endotracheal tube terminates approximately 4 cm above the sujata. Heart shadow is enlarged but unchanged from prior exam. Bibasilar airspace opacities are again demonstrated. No appreciable pneumothorax. No evidence of acute osseous abnormality. Lucent lesion within T12 is better demonstrated on same-day CT. Impression: 1. Endotracheal tube terminates 4 cm above the sujata. 2. Redemonstration of bibasilar airspace opacities. Workstation performed: [id number]     XR chest 1 view portable    Result Date: 9/13/2023  Narrative: CHEST INDICATION:   post intubation, adjusting ET Tube. COMPARISON:  None EXAM PERFORMED/VIEWS:  XR CHEST PORTABLE FINDINGS: Endotracheal tube terminates approximately 3 cm above the sujata. Cardiomediastinal silhouette appears unremarkable. Bibasilar airspace opacities are again noted. No appreciable pneumothorax or pleural effusion. No evidence of acute osseous abnormality. Lucent lesion within T12 is better demonstrated on same-day CT. Impression: 1. Endotracheal tube terminates 3.3 cm above the sujata. 2. Bibasilar airspace opacities are again demonstrated. Workstation performed: [id number]     XR chest portable    Result Date: 9/13/2023  Narrative: CHEST INDICATION:   sob. COMPARISON: 8/27/2023. Subsequent CT chest performed the same day. EXAM PERFORMED/VIEWS:  XR CHEST PORTABLE FINDINGS: Heart shadow is enlarged but unchanged from prior exam. Hazy opacities are noted within the right lung base, possibly atelectasis versus pneumonia. Left basilar opacity is similar to prior radiograph. No appreciable pneumothorax. No evidence of acute osseous abnormality. Cystic lesion within the T12 vertebral body is better characterized on same-day CT. Impression: 1. Hazy bibasilar airspace opacities which may represent atelectasis versus pneumonia.  Left basilar opacity is similar to recent radiograph while right basilar opacity is new Workstation performed: [id number]     CTA ED chest PE Study    Result Date: 9/13/2023  Narrative: CTA - CHEST WITH IV CONTRAST - PULMONARY ANGIOGRAM INDICATION:   R/O PE. Reports shortness of breath since being discharged from the hospital 3 weeks ago for pneumonia. Fatigue. Productive cough with white sputum. Angioedema. COMPARISON: Chest radiograph 9/13/2023, chest CT 8/25/2023, thoracic spine CT 11/13/2013. TECHNIQUE: CT angiogram timed for optimal opacification of the pulmonary arteries. Axial, sagittal, and coronal 2D reformats created from source data. Coronal 3D MIP postprocessing on the acquisition scanner. Radiation dose length product (DLP):  472 mGy-cm . Radiation dose exposure minimized using iterative reconstruction and automated exposure control. IV Contrast:  85 mL of iohexol (OMNIPAQUE) FINDINGS: PULMONARY ARTERIES:  No pulmonary embolus. Moderate pulmonary artery enlargement. LUNGS: Mild patchy new consolidation in the medial segment right middle lobe. Improving opacity in the left upper lobe with mild residual atelectasis/scar. Redemonstration of mild dependent atelectasis in both lower lobes. Severe emphysema. AIRWAYS: No significant filling defects. ET tube 4 cm above the sujata. PLEURA:  Unremarkable. HEART/GREAT VESSELS: Moderate cardiomegaly. MEDIASTINUM AND PRASANTH: Slight regression of hilar and mediastinal lymphadenopathy. The largest node was previously 2.5 x 2.0 cm in the right infrahilar region and is now 1.9 x 1.3 cm (501/94). CHEST WALL AND LOWER NECK: Unremarkable. UPPER ABDOMEN: Right renal cysts. OSSEOUS STRUCTURES: Redemonstration of the large cystic lesion with sclerotic margins in T12 with destruction of the superior and inferior endplates, present as a much smaller thin-walled cystic lesion on the thoracic spine CT from 2013, imaged on a thoracic spine MRI from 2020. Impression: No pulmonary embolus.  Patchy consolidation right middle lobe, new from 8/25/2023, compatible with pneumonia. Slight regression of previous hilar and mediastinal lymphadenopathy. Improving left upper lobe opacity which may have been due to pneumonia with residual atelectasis/scar. Persistent partial atelectasis of the lower lobes. 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 Workstation performed: [id number]     Echo complete w/ contrast if indicated    Result Date: 8/28/2023  Narrative:   Left Ventricle: Left ventricular cavity size is small. Wall thickness is increased. There is severe concentric hypertrophy. The left ventricular ejection fraction is 60%. Systolic function is normal. Wall motion is normal. Diastolic function is mildly abnormal, consistent with grade I (abnormal) relaxation. There is no LV dynamic obstruction at rest.   Right Ventricle: Right ventricular cavity size is normal. Systolic function is normal.   Left Atrium: The atrium is mildly dilated. Mitral Valve: There is mild to moderate regurgitation. There is systolic anterior motion of the chordal apparatus with late peaking gradient 29 mmHg during Valsalva maneuver. Pericardium: There is a trivial pericardial effusion along the right atrial free wall. XR chest portable ICU    Result Date: 8/28/2023  Narrative: CHEST INDICATION:   Acute hypoxic respiratory failure. COMPARISON: 8/25/2023 EXAM PERFORMED/VIEWS:  XR CHEST PORTABLE ICU FINDINGS: Heart shadow is enlarged but unchanged from prior exam. There is stable left-sided volume loss secondary to left basilar atelectasis. No pneumothorax or pleural effusion. Osseous structures appear within normal limits for patient age. Impression: Stable volume loss on the left secondary to left lower lobe atelectasis.  Workstation performed: [id number]     VAS lower limb venous duplex study, complete bilateral    Result Date: 8/27/2023  Narrative:  [facility] REPORT CLINICAL: Indications: Patient presents with bilateral lower extremity pain x 1 days. Operative History: cardiac surgery-date unknown. Risk Factors The patient has history of HTN and CKD. She has no history of Hyperlipidemia. CONCLUSION:  Impression: RIGHT LOWER LIMB: No evidence of acute or chronic deep vein thrombosis. No evidence of superficial thrombophlebitis noted. Doppler evaluation shows a normal response to augmentation maneuvers. . Popliteal, posterior tibial and anterior tibial arterial Doppler waveform's are triphasic. LEFT LOWER LIMB: No evidence of acute or chronic deep vein thrombosis. No evidence of superficial thrombophlebitis noted. Doppler evaluation shows a normal response to augmentation maneuvers. Popliteal, posterior tibial and anterior tibial arterial Doppler waveform's are triphasic. SIGNATURE: Electronically Signed by: Ya Hebert on 2023-08-27 08:12:52 PM    CTA ED chest PE Study    Result Date: 8/25/2023  Narrative: CTA - CHEST WITH IV CONTRAST - PULMONARY ANGIOGRAM INDICATION:   Increased SOB, recent COVID diagnosis. COMPARISON: MRI from 3/18/2020 as well as bone scan from 7/2/2019 and thoracic spine from 11/13/2013 TECHNIQUE: CTA examination of the chest was performed using angiographic technique according to a protocol specifically tailored to evaluate for pulmonary embolism. Multiplanar 2D reformatted images were created from the source data. In addition, coronal 3D MIP postprocessing was performed on the acquisition scanner. The study is limited by some respiratory motion artifacts especially in the lower lobes on the left where there is crowding of vessels due to atelectatic changes. Radiation dose length product (DLP) for this visit:  370 mGy-cm .   This examination, like all CT scans performed in the [facility], was performed utilizing techniques to minimize radiation dose exposure, including the use of iterative reconstruction and automated exposure control. IV Contrast:  80 mL of iohexol (OMNIPAQUE) FINDINGS: PULMONARY ARTERIAL TREE: Limited visualization left lower lobe however there is suspicion for a small embolus in the posterobasal segment on axial image 139, series 305 and coronal image 142. No definite filling defect is seen elsewhere. The main pulmonary artery is significantly dilated at 4.2 cm. The RV LV ratio is elevated at 1.1 cm. The primary pulmonary vascular stent minutes are also dilated chronicity is uncertain. LUNGS: Emphysema is noted with blebs at the apices. There is peripheral consolidation in the left upper lobe. Air bronchogram is not well seen and there is mucous occlusion of the left mainstem bronchus with volume loss of the left upper lobe as well as  left lower lobe to a lesser extent. Some aeration in the left lower lobe is still visible. PLEURA:  Unremarkable. HEART/GREAT VESSELS:  Unremarkable for patient's age. No thoracic aortic aneurysm. MEDIASTINUM AND PRASANTH: 10 mm pretracheal node is identified. 9 mm superior mediastinal node is identified. 10 mm prevascular node is identified. Subcarinal node measures 1.5 cm. Hilar adenopathy is also demonstrated. Right hilar node is larger measuring 1.8 cm in short axis on image 138. CHEST WALL AND LOWER NECK:   Unremarkable. VISUALIZED STRUCTURES IN THE UPPER ABDOMEN: Low-density cyst is seen in the right kidney. . OSSEOUS STRUCTURES: There appears to be a destructive lesion involving the T12 vertebral body eroding both endplates and much of the vertebral body this does not appear to represent a Schmorl's node. A cystic lesion was noted in 2013 at this location however the current lesion is larger with loss of endplates. MRI also imaged this lesion in 2020 the lesion appears somewhat larger on the current examination however. Impression: Limited study. There is equivocal embolus in the posterior basal segment left lower lobe.  Other smaller segments are difficult to assess due to motion and vascular crowding. There is mucous plugging in the left mainstem bronchus with volume loss in portions of the left lower lobe and left upper lobe. Aspiration pneumonia is not excluded. There is dilatation of the main pulmonary artery and proximal pulmonary segment suggesting pulmonary hypertension this is more likely chronic than acute given the degree of distention. Emphysema is present. There is significant mediastinal and hilar adenopathy suggesting underlying neoplasm more likely than simple reactive nodes. Enlarging lesion at T12 is again demonstrated of uncertain etiology. This has been present since 2013. Perhaps a plasmacytoma is a consideration. Neoplastic work-up is advised. Pulmonology consultation is also advised to assess endobronchial obstruction on the left. This was discussed with resident physician Dr. Oswald Beck at 4:58 p.m. Follow-up was marked in the epic system Workstation performed: [id number]     XR chest 1 view portable    Result Date: 8/25/2023  Narrative: CHEST INDICATION:   SOB. COMPARISON: 8/21/2021 EXAM PERFORMED/VIEWS:  XR CHEST PORTABLE Single view FINDINGS: Development of CHF. Probable left basal infiltrate. Cardiomediastinal silhouette has become more enlarged. No pneumothorax or pleural effusion. Osseous structures appear within normal limits for patient age. Impression: Increased cardiomegaly. Development of CHF.  Probable left basal infiltrate Workstation performed: [id number]

## 2023-10-19 NOTE — PROGRESS NOTES
[address] [facility]  Progress Note: Critical Care  Name: Ashutosh Taylor 77 y.o. female I MRN: [id number]  Unit/Bed#: ICU 03 I Date of Admission: 9/13/2023   Date of Service: 10/19/2023 I Hospital Day: 39    Assessment/Plan   * Acute respiratory failure with hypoxia secondary to oropharyngeal mass  Assessment & Plan  Cuffless trach placed 9/17, transitioned to cuffed on 10/3. Oxygen requirements not improving. For mental health patient is on buspirone, quetiapine, and sertraline. For pain, gabapentin, oxycodone scheduled and prn, prn oxycodone mainly utilized for increased pain during chemo and after a bronch. On Guaifenesin, Atrovent and Xopenex for airway maintenance. No improvement in bilateral consolidation or atelectasis and groundglass opacities on repeat CT and chest x-rays. Patient not receiving proper positive pressure to open up atelectatic lung. Bronchial cx with 3 colonies CoNS. PCP PCR invalid, repeat negative. CTCAP indicates additional groundglass opacity with paraseptal emphysema, which may be contributing to inability to remain off vent. SBTs have been unsuccessful to place patient on trach collar since return to ICU. Patient tolerated high flow all day and overnight. Back on vent overnight, failed trach collar o/n. CXR 10/19 appears unchanged from 10/16 just not good breath during image, less of right lung visualized this time    Plan:  Maintain cuff overnight from 9 pm to 6 am for positive pressure, can open cuff during the day for patient to speak  8/8 tonight, can attempt CPAP next    Large cell lymphoma ([address])  Assessment & Plan  Large B-cell lymphoma, stage II. First chemo cycle 9/22 4 days of R-EPOCH. 9/27 Rituxan. 9/28 Filgrastim. Acyclovir, Zyloprim, Diflucan, Levaquin, Zofran, Bactrim for chemo prophylaxis, all discontinued with heme-onc's approval as Nafisa reached 4200. I suspect leukocytosis is secondary to the filgrastim.  Restarted all ppx 10/13 for [facility] cycle 2 which will run for the next 4 days. Ppx and filgrastim can be discontinued when patient is no longer neutropenic after this cycle again. Platelets have been up trending to 696, which did not happen during cycle 1. This could simply be reactive from inflammation, but could also occur from vincristine which is part of current regimen in combination with the residual effect from filgastrim. Likely just reactive but if continues to rise consider checking blood smear, ferritin, ESR, CRP, Jak2, CALR, MPL, BCR-ABL1. Glucose is increasing on past 2 morning draws, appears to be 2/2 prednisolone from regimen. PICC came out with 3 hours chemo remaining. IR will place a tunneled line today. Patient will receive cytoxan today and benadryl with rituxan tomorrow. Neutropenic precautions will be needed in a few days    Plan:  Keep Hgb>7 and Plt>20 for chemo  IR for tunneled line insertion  See acute respiratory failure above    Oropharyngeal mass  Assessment & Plan  9/14 Neck/ soft tissue CT: Large enhancing soft tissue mass identified within the left neck involving multiple spaces. Centered within the left oropharynx with extensions as described above into multiple spaces. This results in significant airway compromise. 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 Small level 3/level 4 nodes are seen within the neck. Tracheostomy by ENT, bx & addition testing performed. Replaced on 9/26. H/o tobacco abuse, daily smoker. On nicotine while inpatient, confirmed with patient she needs nicotine patch. CT soft tissue neck shows reduced mass effect from 9/14 to 10/13.  Can consider reducing trach size if continues to improve    Plan:  ENT and heme onc following  Will titrate NRT weekly  See acute respiratory failure and large B cell lymphoma above      (HFpEF) heart failure with preserved ejection fraction [facility])  Assessment & Plan  Wt Readings from Last 3 Encounters:   10/19/23 78.5 kg (173 lb 1 oz)   09/07/23 74.6 kg (164 lb 6.4 oz)   08/30/23 73.3 kg (161 lb 9.6 oz)     Lab Results   Component Value Date    LVEF 60 08/28/2023     (H) 09/29/2023     (H) 09/13/2023     Echo 8/28/23: LVEF 60%. Plan:  K > 4   Measure I/O      Angioedema  Assessment & Plan  POA in [place] ED: Swollen tongue, soft and hard palate with severe angioedema. Decadron 10 mg, Benadryl 25 mg given. 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 Plan:  Cuffless trach 9/17, transitioned to cuffed 10/3  See acute respiratory failure and oropharyngeal mass above    Ambulatory dysfunction  Assessment & Plan  Impacted by chronic pain syndrome, but medication regimen may also contribute. Patient currently requires tracheostomy with ICU level care. Continue OOB with PT. Cannot get LTACH placement while getting chemo per CM. Pain syndrome, chronic  Assessment & Plan  Home regimen: Oxycodone 5 mg BID, Tylenol 650 mg q8 prn. Gabapentin 600 mg TID. Medical marijuana. Lumbar radiculopathy and impaired ambulatory dysfunction secondary to pain. Has bowel regimen and is having bowel movements daily. Patient required additional pain management during previous cycle    Plan:  Continue gabapentin 300 mg TID, oxycodone 5 mg TID, prn Tylenol 650 mg q6. Oxycodone 2.5 mg prn can d/c a day after tunneled line replaced    Benign essential hypertension  Assessment & Plan  Home regimen Coreg 12.5 mg BID, Amlodipine 10 mg daily, and lisinopril 40 mg. All discontinued at this time secondary to hypotension and PO since cycle 1. but stable currently without any antihypertensives. Systolic persistently elevated and tachycardic, potentially secondary to pain. Patient was more hypotensive during last chemo. Coreg contraindicated during chemo, since cycles are every other week, would be better to stick with lopressor during duration of therapy as opposed to switching constantly. Plan:  Monitor vitals.    Continue Norvasc 10 and lopressor 25 bid  Prn hydralazine for SBP > 160    Depression  Assessment & Plan  Patient on Zoloft 75 daily and Seroquel evening. Patient is depressed, family meeting with palliative on 10/5 for goals of care. Patient is firm that she wants to live and to fight, she is just tired. Palliative signed off, will reconsult if patient changes her mind regarding wishes. Generalized abdominal pain  Assessment & Plan  Patient reports abdominal pain which is unchanged since 9/22 no guarding on exam. Takes Famotidine for GERD at home. Alk phos 10/2 145, 10/19 93. CTCAP reveals complex right upper pole renal lesion requiring possible outpatient MRI    - Continue Famotidine  - On bowel regimen with Senna and Miralax prn    Chronic Superficial thrombophlebitis  Assessment & Plan  Right forearm soreness. RUE US: No acute or chronic deep vein thrombosis. 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 PO not severe enough to require renal dosing at this time, will continue to monitor and adjust dosing as needed.     -Continue DVT ppx with Lovenox 40    Blister (nonthermal) of left forearm, sequela  Assessment & Plan  Blisters of left upper extremity healing, no signs of infection, continue daily wound care. CKD (chronic kidney disease) stage 3, GFR 30-59 ml/min [facility])  Assessment & Plan  Lab Results   Component Value Date    EGFR 44 10/19/2023    EGFR 65 10/18/2023    EGFR 72 10/17/2023    CREATININE 1.22 10/19/2023    CREATININE 0.89 10/18/2023    CREATININE 0.82 10/17/2023     Baseline Cr appears to be between 0.75-1.1. Patient received 2 doses of Bumex IV 2 g as she had not been responding appropriately to IV 40 Lasix daily. Creatinine went up by 0.5 meeting criteria for an PO. This may be prerenal intrinsic or postrenal.  Potential prerenal causes include reduced kidney perfusion due to lisinopril.   Intrinsic can also be lisinopril due to ATN, AIN from chemo medications, TLS, crystaline nephropathy from acyclovir, rituxan nephrotoxicity, filgrastim glomerulonephritis. Post renal obstructive could be from urine retention from vincristine or cyclophosphamide bladder fibrosis. Suspect most likely due to medications as a combination of prerenal and intrinsic. FENa was 0.2%, UA shows no casts or signs of infection, urine eosinophils 0%. Patient likely has prerenal HERIBERTO from volume depletion. Received 2 L NS and 1 pRBC and cr went up by 0.07 still and Na and Cl went down, suspect that volume prevented further cr rise and free water excretion impaired by kidney function, allowing hyponatremia to increase. Creatinine increase is likely plateaued and went down the following day. Suspect that now that nephrotoxic medications have been stopped she will continue to respond well to Lasix and creatinine will continue to downtrend. HERIBERTO now resolved. Will be cautious about nephrotoxins and monitor as restarting EPOCH and ppx. Patient gets volume depleted and overloaded very easily. Especially from chemo. Heriberto as cr went up by 0.3 in 48 hours from 0.82 on 10/17 to 1.22 on 10/19. Suspect this is prerenal hypovolemic, will see if patient improves with fluids. She gets overloaded easily so will attempt gentle fluids, if no improvement suspect CRS again. Isolyte 75 mL/hr for 10 hours          ICU Core Measures     A: Assess, Prevent, and Manage Pain Has pain been assessed? Yes  Need for changes to pain regimen? No   B: Both SAT/SAT  N/A   C: Choice of Sedation RASS Goal: 0 Alert and Calm or N/A patient not on sedation  Need for changes to sedation or analgesia regimen? No   D: Delirium CAM-ICU: Negative   E: Early Mobility  Plan for early mobility? No   F: Family Engagement Plan for family engagement today? No       Antibiotic Review: Continue broad spectrum secondary to severity of illness.      Review of Invasive Devices:            Prophylaxis:  VTE VTE covered by:  enoxaparin, Subcutaneous, 40 mg at 10/18/23 0907       Stress Ulcer  covered byfamotidine (PEPCID) oral suspension 40 mg [[id number]], pantoprazole (PROTONIX) injection 40 mg [[id number]]          Subjective   HPI/24hr events:   Overnight patient desatted to the 80s and had to be placed back on vent. She received a CXR this morning. Cytoxan and IR tunneled line insertion scheduled for today. This morning patient feels physically okay but is worried and tired of waiting. She wants the line put back in to continue treatment. Review of Systems   Constitutional:  Negative for chills and fever. Respiratory:  Negative for shortness of breath and wheezing. Cardiovascular:  Negative for chest pain, palpitations and leg swelling. Gastrointestinal:  Positive for nausea. Objective                            Vitals I/O      Most Recent Min/Max in 24hrs   Temp 99 °F (37.2 °C) Temp  Min: 98.6 °F (37 °C)  Max: 99 °F (37.2 °C)   Pulse 89 Pulse  Min: 87  Max: 98   Resp (!) 34 Resp  Min: 14  Max: 34   /61 BP  Min: 111/61  Max: 146/83   O2 Sat 96 % SpO2  Min: 86 %  Max: 98 %      Intake/Output Summary (Last 24 hours) at 10/19/2023 0744  Last data filed at 10/18/2023 2237  Gross per 24 hour   Intake 342 ml   Output 525 ml   Net -183 ml         Diet NPO; Sips with meds     Invasive Monitoring Physical exam    Physical Exam  Constitutional:       General: She is not in acute distress. Comments: On trach collar  anxious   HENT:      Head: Normocephalic and atraumatic. Cardiovascular:      Rate and Rhythm: Normal rate and regular rhythm. Heart sounds: Normal heart sounds. No murmur heard. Pulmonary:      Effort: Pulmonary effort is normal.      Breath sounds: Normal breath sounds. Abdominal:      Tenderness: There is no guarding. Musculoskeletal:      Right lower leg: No edema. Left lower leg: No edema. Skin:     Coloration: Skin is not jaundiced or pale. Neurological:      Mental Status: She is alert and oriented to person, place, and time.            Diagnostic Studies Imaging: CXR 10/19 read pending but appears unchanged with less visualization of right lung 2/2 to shallow breath I have personally reviewed pertinent films in PACS     Medications:  Scheduled PRN   [START ON 10/20/2023] acetaminophen, 650 mg, Once  acyclovir, 400 mg, BID  amLODIPine, 10 mg, Daily  busPIRone, 30 mg, TID  chlorhexidine, 15 mL, Q12H ASHLEY  cyclophosphamide (CYTOXAN) 1,350 mg in sodium chloride 0.9 % 250 mL IVPB, 750 mg/m2 (Treatment Plan Recorded), Once  [START ON 10/20/2023] diphenhydrAMINE (BENADRYL) 25 mg in sodium chloride 0.9 % 50 mL IVPB, 25 mg, Once  enoxaparin, 40 mg, Q24H ASHLEY  famotidine, 20 mg, BID  fluconazole, 200 mg, Daily  gabapentin, 300 mg, TID  levalbuterol, 1.25 mg, TID   And  ipratropium, 0.5 mg, TID  levofloxacin, 250 mg, Q24H [address]  melatonin, 3 mg, HS  metoprolol tartrate, 25 mg, Q12H [address]  nicotine, 7 mg, Daily  oxyCODONE, 5 mg, Q8H  QUEtiapine, 50 mg, HS  [START ON 10/20/2023] riTUXimab (RITUXAN) 700 mg in sodium chloride 0.9 % 280 mL subsequent titrated chemo infusion, 700 mg, Once  senna, 8.8 mg, HS  sertraline, 75 mg, Daily  sodium chloride, 2,000 mL, Once  sodium chloride, 20 mL/hr, Once  sulfamethoxazole-trimethoprim, 1 tablet, Once per day on Mon Wed Fri      acetaminophen, 650 mg, Q6H PRN  alteplase, 2 mg, Q1MIN PRN  alteplase, 2 mg, Q1MIN PRN  guaiFENesin, 200 mg, Q6H PRN  hydrALAZINE, 5 mg, Q6H PRN  ondansetron, 4 mg, Q8H PRN  ondansetron, 4 mg, Q8H PRN  oxyCODONE, 2.5 mg, Q6H PRN  polyethylene glycol, 17 g, Daily PRN  sodium chloride, 4 mL, Q6H PRN       Continuous          Labs:    CBC    Recent Labs     10/18/23  0542 10/19/23  0447   WBC 6.42 10.60*   HGB 8.9* 9.4*   HCT 27.8* 29.0*   * 780*   BANDSPCT  --  1     BMP    Recent Labs     10/18/23  0542 10/19/23  0448   SODIUM 133* 137   K 4.3 4.1   CL 96 99   CO2 29 30   AGAP 8 8   BUN 49* 52*   CREATININE 0.89 1.22   CALCIUM 9.6 9.5       Coags    No recent results     Additional Electrolytes  No recent results       Blood Gas    No recent results  No recent results LFTs  Recent Labs     10/18/23  0542 10/19/23  0448   ALT 34 27   AST 19 14   ALKPHOS 102 93   ALB 3.0* 3.0*   TBILI 0.35 0.59       Infectious  No recent results  Glucose  Recent Labs     10/18/23  0542 10/19/23  0448   GLUC 135 91                   Keith William MD

## 2023-10-20 LAB
ALBUMIN SERPL BCP-MCNC: 2.8 G/DL (ref 3.5–5)
ALP SERPL-CCNC: 79 U/L (ref 34–104)
ALT SERPL W P-5'-P-CCNC: 21 U/L (ref 7–52)
ANION GAP SERPL CALCULATED.3IONS-SCNC: 7 MMOL/L
AST SERPL W P-5'-P-CCNC: 12 U/L (ref 13–39)
BASOPHILS # BLD MANUAL: 0 THOUSAND/UL (ref 0–0.1)
BASOPHILS NFR MAR MANUAL: 0 % (ref 0–1)
BILIRUB SERPL-MCNC: 0.31 MG/DL (ref 0.2–1)
BUN SERPL-MCNC: 38 MG/DL (ref 5–25)
CALCIUM ALBUM COR SERPL-MCNC: 9.8 MG/DL (ref 8.3–10.1)
CALCIUM SERPL-MCNC: 8.8 MG/DL (ref 8.4–10.2)
CHLORIDE SERPL-SCNC: 103 MMOL/L (ref 96–108)
CO2 SERPL-SCNC: 27 MMOL/L (ref 21–32)
CREAT SERPL-MCNC: 0.91 MG/DL (ref 0.6–1.3)
EOSINOPHIL # BLD MANUAL: 0.15 THOUSAND/UL (ref 0–0.4)
EOSINOPHIL NFR BLD MANUAL: 2 % (ref 0–6)
ERYTHROCYTE [DISTWIDTH] IN BLOOD BY AUTOMATED COUNT: 15 % (ref 11.6–15.1)
GFR SERPL CREATININE-BSD FRML MDRD: 64 ML/MIN/1.73SQ M
GLUCOSE SERPL-MCNC: 132 MG/DL (ref 65–140)
HCT VFR BLD AUTO: 32.4 % (ref 34.8–46.1)
HGB BLD-MCNC: 10.1 G/DL (ref 11.5–15.4)
LDH SERPL-CCNC: 157 U/L (ref 140–271)
LYMPHOCYTES # BLD AUTO: 0.36 THOUSAND/UL (ref 0.6–4.47)
LYMPHOCYTES # BLD AUTO: 5 % (ref 14–44)
MAGNESIUM SERPL-MCNC: 2.1 MG/DL (ref 1.9–2.7)
MCH RBC QN AUTO: 30.1 PG (ref 26.8–34.3)
MCHC RBC AUTO-ENTMCNC: 31.2 G/DL (ref 31.4–37.4)
MCV RBC AUTO: 96 FL (ref 82–98)
MONOCYTES # BLD AUTO: 0 THOUSAND/UL (ref 0–1.22)
MONOCYTES NFR BLD: 0 % (ref 4–12)
NEUTROPHILS # BLD MANUAL: 6.75 THOUSAND/UL (ref 1.85–7.62)
NEUTS SEG NFR BLD AUTO: 93 % (ref 43–75)
PHOSPHATE SERPL-MCNC: 4.3 MG/DL (ref 2.3–4.1)
PLATELET # BLD AUTO: 516 THOUSANDS/UL (ref 149–390)
PLATELET BLD QL SMEAR: ABNORMAL
PMV BLD AUTO: 9.6 FL (ref 8.9–12.7)
POTASSIUM SERPL-SCNC: 3.8 MMOL/L (ref 3.5–5.3)
PROT SERPL-MCNC: 5.1 G/DL (ref 6.4–8.4)
RBC # BLD AUTO: 3.36 MILLION/UL (ref 3.81–5.12)
RBC MORPH BLD: NORMAL
SODIUM SERPL-SCNC: 137 MMOL/L (ref 135–147)
URATE SERPL-MCNC: 4.3 MG/DL (ref 2–7.5)
WBC # BLD AUTO: 7.26 THOUSAND/UL (ref 4.31–10.16)

## 2023-10-20 PROCEDURE — NC001 PR NO CHARGE: Performed by: INTERNAL MEDICINE

## 2023-10-20 PROCEDURE — 94003 VENT MGMT INPAT SUBQ DAY: CPT

## 2023-10-20 PROCEDURE — 94150 VITAL CAPACITY TEST: CPT

## 2023-10-20 PROCEDURE — 84100 ASSAY OF PHOSPHORUS: CPT | Performed by: STUDENT IN AN ORGANIZED HEALTH CARE EDUCATION/TRAINING PROGRAM

## 2023-10-20 PROCEDURE — 94640 AIRWAY INHALATION TREATMENT: CPT

## 2023-10-20 PROCEDURE — 99291 CRITICAL CARE FIRST HOUR: CPT | Performed by: STUDENT IN AN ORGANIZED HEALTH CARE EDUCATION/TRAINING PROGRAM

## 2023-10-20 PROCEDURE — 97535 SELF CARE MNGMENT TRAINING: CPT

## 2023-10-20 PROCEDURE — 84550 ASSAY OF BLOOD/URIC ACID: CPT | Performed by: STUDENT IN AN ORGANIZED HEALTH CARE EDUCATION/TRAINING PROGRAM

## 2023-10-20 PROCEDURE — 80053 COMPREHEN METABOLIC PANEL: CPT | Performed by: STUDENT IN AN ORGANIZED HEALTH CARE EDUCATION/TRAINING PROGRAM

## 2023-10-20 PROCEDURE — 83735 ASSAY OF MAGNESIUM: CPT | Performed by: STUDENT IN AN ORGANIZED HEALTH CARE EDUCATION/TRAINING PROGRAM

## 2023-10-20 PROCEDURE — 94760 N-INVAS EAR/PLS OXIMETRY 1: CPT

## 2023-10-20 PROCEDURE — NC001 PR NO CHARGE: Performed by: STUDENT IN AN ORGANIZED HEALTH CARE EDUCATION/TRAINING PROGRAM

## 2023-10-20 PROCEDURE — 85007 BL SMEAR W/DIFF WBC COUNT: CPT | Performed by: STUDENT IN AN ORGANIZED HEALTH CARE EDUCATION/TRAINING PROGRAM

## 2023-10-20 PROCEDURE — 97530 THERAPEUTIC ACTIVITIES: CPT

## 2023-10-20 PROCEDURE — 92526 ORAL FUNCTION THERAPY: CPT | Performed by: SPEECH-LANGUAGE PATHOLOGIST

## 2023-10-20 PROCEDURE — 85027 COMPLETE CBC AUTOMATED: CPT | Performed by: STUDENT IN AN ORGANIZED HEALTH CARE EDUCATION/TRAINING PROGRAM

## 2023-10-20 PROCEDURE — 83615 LACTATE (LD) (LDH) ENZYME: CPT | Performed by: STUDENT IN AN ORGANIZED HEALTH CARE EDUCATION/TRAINING PROGRAM

## 2023-10-20 RX ORDER — POTASSIUM CHLORIDE 20MEQ/15ML
40 LIQUID (ML) ORAL ONCE
Status: COMPLETED | OUTPATIENT
Start: 2023-10-20 | End: 2023-10-20

## 2023-10-20 RX ADMIN — ENOXAPARIN SODIUM 40 MG: 40 INJECTION SUBCUTANEOUS at 09:02

## 2023-10-20 RX ADMIN — SULFAMETHOXAZOLE AND TRIMETHOPRIM 1 TABLET: 800; 160 TABLET ORAL at 09:03

## 2023-10-20 RX ADMIN — METOPROLOL TARTRATE 25 MG: 25 TABLET, FILM COATED ORAL at 09:05

## 2023-10-20 RX ADMIN — LEVOFLOXACIN 250 MG: 250 TABLET, FILM COATED ORAL at 09:03

## 2023-10-20 RX ADMIN — ACYCLOVIR 400 MG: 200 CAPSULE ORAL at 17:59

## 2023-10-20 RX ADMIN — LEVALBUTEROL HYDROCHLORIDE 1.25 MG: 1.25 SOLUTION RESPIRATORY (INHALATION) at 13:54

## 2023-10-20 RX ADMIN — NICOTINE 7 MG: 7 PATCH, EXTENDED RELEASE TRANSDERMAL at 09:03

## 2023-10-20 RX ADMIN — SERTRALINE 75 MG: 25 TABLET, FILM COATED ORAL at 09:03

## 2023-10-20 RX ADMIN — METOPROLOL TARTRATE 25 MG: 25 TABLET, FILM COATED ORAL at 20:38

## 2023-10-20 RX ADMIN — Medication 8.8 MG: at 22:47

## 2023-10-20 RX ADMIN — BUSPIRONE HYDROCHLORIDE 30 MG: 10 TABLET ORAL at 20:39

## 2023-10-20 RX ADMIN — CHLORHEXIDINE GLUCONATE 15 ML: 1.2 SOLUTION ORAL at 09:02

## 2023-10-20 RX ADMIN — FAMOTIDINE 20 MG: 20 TABLET, FILM COATED ORAL at 17:59

## 2023-10-20 RX ADMIN — MELATONIN 3 MG: at 22:48

## 2023-10-20 RX ADMIN — DIPHENHYDRAMINE HYDROCHLORIDE 25 MG: 50 INJECTION, SOLUTION INTRAMUSCULAR; INTRAVENOUS at 10:24

## 2023-10-20 RX ADMIN — BUSPIRONE HYDROCHLORIDE 30 MG: 10 TABLET ORAL at 15:51

## 2023-10-20 RX ADMIN — OXYCODONE HYDROCHLORIDE 5 MG: 5 SOLUTION ORAL at 12:38

## 2023-10-20 RX ADMIN — FAMOTIDINE 20 MG: 20 TABLET, FILM COATED ORAL at 09:04

## 2023-10-20 RX ADMIN — IPRATROPIUM BROMIDE 0.5 MG: 0.5 SOLUTION RESPIRATORY (INHALATION) at 07:26

## 2023-10-20 RX ADMIN — QUETIAPINE FUMARATE 50 MG: 25 TABLET ORAL at 22:48

## 2023-10-20 RX ADMIN — GABAPENTIN 300 MG: 300 CAPSULE ORAL at 15:51

## 2023-10-20 RX ADMIN — ACETAMINOPHEN 650 MG: 325 TABLET, FILM COATED ORAL at 09:02

## 2023-10-20 RX ADMIN — ACYCLOVIR 400 MG: 200 CAPSULE ORAL at 09:06

## 2023-10-20 RX ADMIN — POTASSIUM CHLORIDE 40 MEQ: 1.5 SOLUTION ORAL at 09:04

## 2023-10-20 RX ADMIN — AMLODIPINE BESYLATE 10 MG: 5 TABLET ORAL at 09:05

## 2023-10-20 RX ADMIN — OXYCODONE HYDROCHLORIDE 5 MG: 5 SOLUTION ORAL at 04:08

## 2023-10-20 RX ADMIN — GABAPENTIN 300 MG: 300 CAPSULE ORAL at 20:39

## 2023-10-20 RX ADMIN — IPRATROPIUM BROMIDE 0.5 MG: 0.5 SOLUTION RESPIRATORY (INHALATION) at 20:07

## 2023-10-20 RX ADMIN — LEVALBUTEROL HYDROCHLORIDE 1.25 MG: 1.25 SOLUTION RESPIRATORY (INHALATION) at 07:26

## 2023-10-20 RX ADMIN — GABAPENTIN 300 MG: 300 CAPSULE ORAL at 09:05

## 2023-10-20 RX ADMIN — FLUCONAZOLE 200 MG: 100 TABLET ORAL at 09:03

## 2023-10-20 RX ADMIN — LEVALBUTEROL HYDROCHLORIDE 1.25 MG: 1.25 SOLUTION RESPIRATORY (INHALATION) at 20:07

## 2023-10-20 RX ADMIN — OXYCODONE HYDROCHLORIDE 5 MG: 5 SOLUTION ORAL at 20:41

## 2023-10-20 RX ADMIN — FILGRASTIM-AAFI 300 MCG: 300 INJECTION, SOLUTION SUBCUTANEOUS at 20:38

## 2023-10-20 RX ADMIN — RITUXIMAB 700 MG: 10 INJECTION, SOLUTION INTRAVENOUS at 10:51

## 2023-10-20 RX ADMIN — CHLORHEXIDINE GLUCONATE 15 ML: 1.2 SOLUTION ORAL at 20:38

## 2023-10-20 RX ADMIN — IPRATROPIUM BROMIDE 0.5 MG: 0.5 SOLUTION RESPIRATORY (INHALATION) at 13:54

## 2023-10-20 RX ADMIN — BUSPIRONE HYDROCHLORIDE 30 MG: 10 TABLET ORAL at 09:07

## 2023-10-20 NOTE — PROGRESS NOTES
I was notified by Ms. Ross's nurse that patient wants to leave against medical advice at approximately 3:30pm. I went in to inquire further as to why patient wants to go home and Ms. Ross expressed frustration with her current situation, including interactions with staff and continued ventilator requirement. I inquired with her multiple times whether there is anything we can do to change her mind, however patient was adamant that she wants to leave. I discussed with Ms. Ross that unfortunately it will takes a few days for her to get supplies and a ventilator set up at home, and that I am concerned for her wellbeing if she were to leave the hospital without those available. I explained to her that I don't think she will be even able to make it to the parking lot given her oxygen requirements. Per discussion with patient she is aware of the risks of leaving without oxygen and supplies, including hypoxia, hypoxemia and death, stating that "Her body will take care of itself". Ms. Leija verbalized understanding and stated that it is her right to choose what happens to her with which I agreed, however I told her I want to ensure that the decision she makes is informed. Dr. Mary Peters and I reached out to case management to inquire as to what supplies Ms. Leija will need at home and when would be the earliest time this would be available. Per discussion with CM, patient will require a home ventilator set up for her, home oxygen and 24/7 skilled care given her inability to be maintained off the ventilator overnight. The earliest this would be available would be early next week. At around 5pm, Dr. Mary Peters, myself and Dr. Quinton Mcgarry had another lengthy discussion with Ms. Ross, with Camelia Multani explaining in extensive detail the risks of her leaving AMA and without having equipment set up at home, including but not limited to hypoxia, hypoxemia, respiratory and cardiac arrest as well as neutropenia and neutropenic fever given her recent cycle of chemotherapy, and death. Patient said that she is aware of those risks but is still willing to go through with a discharge against medical advice. She verbalized understanding of the current situation, but once again noted that she believes that "God will take care of her". Ultimately patient appears to be competent and able to make her own decisions.

## 2023-10-20 NOTE — UTILIZATION REVIEW
Still in house as of today, 10/20/23    NOTIFICATION OF INPATIENT ADMISSION   AUTHORIZATION REQUEST   SERVICING FACILITY:   [address] [place] [address], [address] [place] [address]  Tax ID: [id number]  NPI: [id number]   ATTENDING PROVIDER:  Attending Name and NPI#: Gretchen Valentino [[id number]]  Address: [address] [place] [address], [address] [place] [address]  Phone: [phone]     ADMISSION INFORMATION:  [facility]   Place of Service Code: 21  Inpatient Admission Date/Time: 9/13/23  4:00 PM  Discharge Date/Time: No discharge date for patient encounter. Admitting Diagnosis Code/Description:  Angioedema, initial encounter [T78. 3XXA]     UTILIZATION REVIEW CONTACT:  Cm Mojica Utilization   Network Utilization Review Department  Phone: [phone]  Fax: [phone]  Email: [email or web address]  Contact for approvals/pending authorizations, clinical reviews, and discharge. PHYSICIAN ADVISORY SERVICES:  Medical Necessity Denial & Aqyw-gp-Hvou Review  Phone: [phone]  Fax: [phone]  Email: [email or web address]     DISCHARGE SUPPORT TEAM:  For Patients Discharge Needs & Updates  Phone: [phone] opt. 2 Fax: [phone]  Email: [email or web address]

## 2023-10-20 NOTE — SPEECH THERAPY NOTE
Speech Language/Pathology    Speech/Language Pathology Progress Note    Patient Name: Rocky Downing  RTRBC'W Date: 10/20/2023    Subjective:  "Oh thank god"  Patient now tolerating trach collar with PMSV during the day. Has been requiring ventilation at night but RN reports they plan to attempt BIPAP tonight. Objective:  Patient seen upright in bed with PMSV in place. Cuffed deflated. VSS: HR 88, SPO2 98 and RR 20 upon my arrival.     Wet vocal quality was noted which she cleared given verbal cues to do so. She consumed x4tsps of puree mixed with phagen blue dye with subsequent wet vocal quality and noted blue secretions draining from stoma site. PO discontinued and patient was asked to complete strong cough with blue tinged secretions cleared. No obvious applesauce however suspect silent aspiration. RT completed tracheal suction with min-mod secretions noted. Assessment:  Patient appears to be at high risk for aspiration- likely a component of silent aspiration. Patient is eager to have po- can consider VBS to further assess and determine any potential benefit from strategies.      Plan/Recommendations:  NPO with VBS prior to po    Michael Dubose M.S., CCC-SLP  Speech Language Pathologist   Available via [address] [place] [address] #[id number]  [place] #[id number]

## 2023-10-20 NOTE — CASE MANAGEMENT
Case Management Progress Note    Patient name Shantal Rapp  Location ICU 03/ICU 03 MRN [id number]  : 1955 Date 10/20/2023       LOS (days): 37  Geometric Mean LOS (GMLOS) (days): 26.10  Days to GMLOS:-10.8        OBJECTIVE:        Current admission status: Inpatient  Preferred Pharmacy:   [id number] [place] - [address] [facility]. [address]  [address]  Phone: [phone] Fax: [phone] ([facility] [place], [place] - [address] [address]  Phone: [phone] Fax: [phone]    Primary Care Provider: Rona Ramires MD    Primary Insurance: [facility]  Secondary Insurance: [facility]    PROGRESS NOTE:    CM updated STR referrals via [employer]. Aware CM will f/u on Monday regarding pt vent wean. Return call made to [facility] coordinator. CM able to speak with Erlinda Baptiste - pt's . Carly aware that pt is not yet ready to transition to STR at a SNF. CM updated Erlinda Baptiste that pt has been doing well with therapy. She also had gotten an update from pt daughter, Kb Sinha, that chemo treatment has been going well. Carly aware CM will continue to update her where pt goes to STR. She is aware pt goal does remain to return home after rehab.

## 2023-10-20 NOTE — PROGRESS NOTES
[address] [facility]  Progress Note: Critical Care  Name: Smiley Melara 77 y.o. female I MRN: [id number]  Unit/Bed#: ICU 03 I Date of Admission: 9/13/2023   Date of Service: 10/20/2023 I Hospital Day: 37    Assessment/Plan   * Acute respiratory failure with hypoxia secondary to oropharyngeal mass  Assessment & Plan  Cuffless trach placed 9/17, transitioned to cuffed on 10/3. Oxygen requirements not improving. For mental health patient is on buspirone, quetiapine, and sertraline. For pain, gabapentin, oxycodone scheduled and prn, prn oxycodone mainly utilized for increased pain during chemo and after a bronch. On Guaifenesin, Atrovent and Xopenex for airway maintenance. No improvement in bilateral consolidation or atelectasis and groundglass opacities on repeat CT and chest x-rays. Patient not receiving proper positive pressure to open up atelectatic lung. Bronchial cx with 3 colonies CoNS. PCP PCR invalid, repeat negative. CTCAP indicates additional groundglass opacity with paraseptal emphysema, which may be contributing to inability to remain off vent. SBTs have been unsuccessful to place patient on trach collar since return to ICU. Patient tolerated high flow all day and overnight. Back on vent overnight, failed trach collar o/n. CXR 10/19 appears unchanged from 10/16 just not good breath during image, less of right lung visualized this time. Did well overnight on 6/8 consider CPAP vs overnight trach collar trial    Plan:  Maintain cuff overnight from 9 pm to 6 am for positive pressure, can open cuff during the day for patient to speak  6/8 tonight, can attempt CPAP next    Large cell lymphoma ([address])  Assessment & Plan  Large B-cell lymphoma, stage II. First chemo cycle 9/22 4 days of R-EPOCH. 9/27 Rituxan. 9/28 Filgrastim. Acyclovir, Zyloprim, Diflucan, Levaquin, Zofran, Bactrim for chemo prophylaxis, all discontinued with heme-onc's approval as Nafisa reached 4200.  I suspect leukocytosis is secondary to the filgrastim. Restarted all ppx 10/13 for [facility] cycle 2 which will run for the next 4 days. Ppx and filgrastim can be discontinued when patient is no longer neutropenic after this cycle again. Platelets have been up trending to 696, which did not happen during cycle 1. This could simply be reactive from inflammation, but could also occur from vincristine which is part of current regimen in combination with the residual effect from filgastrim. Likely just reactive but if continues to rise consider checking blood smear, ferritin, ESR, CRP, Jak2, CALR, MPL, BCR-ABL1. Glucose is increasing on past 2 morning draws, appears to be 2/2 prednisolone from regimen. PICC came out with 3 hours chemo remaining. Tunneled line in R IJ placed yesterday and received cytoxan. Today benadryl with rituxan. Neutropenic precautions will be needed in a few days    Plan:  Keep Hgb>7 and Plt>20 for chemo  See acute respiratory failure above    Oropharyngeal mass  Assessment & Plan  9/14 Neck/ soft tissue CT: Large enhancing soft tissue mass identified within the left neck involving multiple spaces. Centered within the left oropharynx with extensions as described above into multiple spaces. This results in significant airway compromise. 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 Small level 3/level 4 nodes are seen within the neck. Tracheostomy by ENT. Replaced on 9/26. H/o tobacco abuse, daily smoker. On nicotine while inpatient, confirmed with patient she needs nicotine patch. CT soft tissue neck shows reduced mass effect from 9/14 to 10/13.  Can consider reducing trach size if continues to improve    Plan:  ENT and heme onc following  Will titrate NRT weekly  See acute respiratory failure and large B cell lymphoma above      (HFpEF) heart failure with preserved ejection fraction [facility])  Assessment & Plan  Wt Readings from Last 3 Encounters:   10/20/23 76.5 kg (168 lb 10.4 oz)   09/07/23 74.6 kg (164 lb 6.4 oz)   08/30/23 73.3 kg (161 lb 9.6 oz)     Lab Results   Component Value Date    LVEF 60 08/28/2023     (H) 09/29/2023     (H) 09/13/2023     Echo 8/28/23: LVEF 60%. Plan:  K > 4   Measure I/O      Angioedema  Assessment & Plan  POA in [facility] ED: Swollen tongue, soft and hard palate with severe angioedema. Decadron 10 mg, Benadryl 25 mg given. 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 Plan:  Cuffless trach 9/17, cuffed 10/3  See acute respiratory failure and oropharyngeal mass above    Ambulatory dysfunction  Assessment & Plan  Impacted by chronic pain syndrome, but medication regimen may also contribute. Patient currently requires tracheostomy with ICU level care. Continue OOB with PT. Cannot get LTACH placement while getting chemo per CM, so likely in ICU until after cycle 3. Pain syndrome, chronic  Assessment & Plan  Home regimen: Oxycodone 5 mg BID, Tylenol 650 mg q8 prn. Gabapentin 600 mg TID. Medical marijuana. Lumbar radiculopathy and impaired ambulatory dysfunction secondary to pain. Has bowel regimen and is having bowel movements daily. Patient required additional pain management during previous cycle and has some additional pain from tunneled line placement    Plan:  Continue gabapentin 300 mg TID, oxycodone 5 mg TID, prn Tylenol 650 mg q6. Oxycodone 2.5 mg prn can d/c a day after tunneled line replaced    Benign essential hypertension  Assessment & Plan  Home regimen Coreg 12.5 mg BID, Amlodipine 10 mg daily, and lisinopril 40 mg. All discontinued at this time secondary to hypotension and PO since cycle 1. but stable currently without any antihypertensives. Systolic persistently elevated and tachycardic, potentially secondary to pain. Patient was more hypotensive during last chemo. Coreg contraindicated during chemo, since cycles are every other week, would be better to stick with lopressor during duration of therapy as opposed to switching constantly.      Plan:  Monitor vitals. Continue Norvasc 10 and lopressor 25 bid  Prn hydralazine for SBP > 160    Depression  Assessment & Plan  Patient on Zoloft 75 daily and Seroquel evening. Patient is depressed, family meeting with palliative on 10/5 for goals of care. Patient is firm that she wants to live and to fight, she is just tired. Palliative signed off, will reconsult if patient changes her mind regarding wishes. Generalized abdominal pain  Assessment & Plan  Patient reports abdominal pain which is unchanged since 9/22 no guarding on exam. Takes Famotidine for GERD at home. Alk phos 10/2 145, 10/20 79. CTCAP reveals complex right upper pole renal lesion requiring possible outpatient MRI    - Continue Famotidine  - On bowel regimen with Senna and Miralax prn    Chronic Superficial thrombophlebitis  Assessment & Plan  Right forearm soreness. RUE US: No acute or chronic deep vein thrombosis. 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 PO not severe enough to require renal dosing at this time, will continue to monitor and adjust dosing as needed.      -Continue DVT ppx with Lovenox 40    Blister (nonthermal) of left forearm, sequela  Assessment & Plan  Blisters of left upper extremity healing, no signs of infection, continue daily wound care. CKD (chronic kidney disease) stage 3, GFR 30-59 ml/min [facility])  Assessment & Plan  Lab Results   Component Value Date    EGFR 64 10/20/2023    EGFR 44 10/19/2023    EGFR 65 10/18/2023    CREATININE 0.91 10/20/2023    CREATININE 1.22 10/19/2023    CREATININE 0.89 10/18/2023     Baseline Cr appears to be between 0.75-1.1. Patient received 2 doses of Bumex IV 2 g as she had not been responding appropriately to IV 40 Lasix daily. Creatinine went up by 0.5 meeting criteria for an PO. This may be prerenal intrinsic or postrenal.  Potential prerenal causes include reduced kidney perfusion due to lisinopril.   Intrinsic can also be lisinopril due to ATN, AIN from chemo medications, TLS, crystaline nephropathy from acyclovir, rituxan nephrotoxicity, filgrastim glomerulonephritis. Post renal obstructive could be from urine retention from vincristine or cyclophosphamide bladder fibrosis. Suspect most likely due to medications as a combination of prerenal and intrinsic. FENa was 0.2%, UA shows no casts or signs of infection, urine eosinophils 0%. Patient likely has prerenal HERIBERTO from volume depletion. Received 2 L NS and 1 pRBC and cr went up by 0.07 still and Na and Cl went down, suspect that volume prevented further cr rise and free water excretion impaired by kidney function, allowing hyponatremia to increase. Creatinine increase is likely plateaued and went down the following day. Suspect that now that nephrotoxic medications have been stopped she will continue to respond well to Lasix and creatinine will continue to downtrend. HERIBERTO now resolved. Will be cautious about nephrotoxins and monitor as restarting EPOCH and ppx. Patient gets volume depleted and overloaded very easily. Especially from chemo. Heriberto as cr went up by 0.3 in 48 hours from 0.82 on 10/17 to 1.22 on 10/19. Suspect this is prerenal hypovolemic, will see if patient improves with fluids. She gets overloaded easily so if no improvement suspect CRS again. Received 2L bolus NS. Today cr down to 0.91, so was likely hypovolemic prerenal          ICU Core Measures     A: Assess, Prevent, and Manage Pain Has pain been assessed? Yes  Need for changes to pain regimen? No   B: Both SAT/SAT  N/A   C: Choice of Sedation RASS Goal: N/A patient not on sedation  Need for changes to sedation or analgesia regimen? No   D: Delirium CAM-ICU: Negative   E: Early Mobility  Plan for early mobility? No   F: Family Engagement Plan for family engagement today? No       Antibiotic Review: Continue broad spectrum secondary to severity of illness.      Review of Invasive Devices:            Prophylaxis:  VTE VTE covered by:  enoxaparin, Subcutaneous, 40 mg at 10/19/23 8903       Stress Ulcer  covered byfamotidine (PEPCID) oral suspension 40 mg [[id number]], pantoprazole (PROTONIX) injection 40 mg [[id number]]          Subjective   HPI/24hr events:   R IJ tunneled line placed yesterday. Received cytoxan. Patient tolerated 6/8 overnight. This morning she feels well. She does report some soreness at line site. Review of Systems   Constitutional:  Negative for fever. Respiratory:  Negative for shortness of breath. Cardiovascular:  Negative for chest pain, palpitations and leg swelling. Gastrointestinal:  Negative for nausea. Objective                            Vitals I/O      Most Recent Min/Max in 24hrs   Temp 98.6 °F (37 °C) Temp  Min: 98.6 °F (37 °C)  Max: 99.4 °F (37.4 °C)   Pulse 97 Pulse  Min: 84  Max: 105   Resp 20 Resp  Min: 13  Max: 30   /72 BP  Min: 88/52  Max: 156/97   O2 Sat 97 % SpO2  Min: 90 %  Max: 100 %      Intake/Output Summary (Last 24 hours) at 10/20/2023 0800  Last data filed at 10/20/2023 0412  Gross per 24 hour   Intake 1366 ml   Output 1300 ml   Net 66 ml         Diet Enteral/Parenteral; Tube Feeding No Oral Diet; Two Telly HN; Continuous; 38; 190; Water; Every 4 hours     Invasive Monitoring Physical exam    Physical Exam  Constitutional:       General: She is not in acute distress. HENT:      Head: Normocephalic and atraumatic. Eyes:      Extraocular Movements: Extraocular movements intact. Cardiovascular:      Rate and Rhythm: Normal rate and regular rhythm. Heart sounds: Normal heart sounds. No murmur heard. Pulmonary:      Effort: Pulmonary effort is normal.      Breath sounds: Rhonchi present. Abdominal:      Tenderness: There is no abdominal tenderness. There is no guarding. Musculoskeletal:      Right lower leg: No edema. Left lower leg: No edema. Skin:     Coloration: Skin is not jaundiced or pale.    Neurological:      Mental Status: She is alert and oriented to person, place, and time. Diagnostic Studies      Imaging: CXR 10/19 Persistent groundglass opacity, likely pulmonary edema, with trace effusions.   I have personally reviewed pertinent films in PACS     Medications:  Scheduled PRN   acetaminophen, 650 mg, Once  acyclovir, 400 mg, BID  amLODIPine, 10 mg, Daily  busPIRone, 30 mg, TID  chlorhexidine, 15 mL, Q12H ASHLEY  diphenhydrAMINE (BENADRYL) 25 mg in sodium chloride 0.9 % 50 mL IVPB, 25 mg, Once  enoxaparin, 40 mg, Q24H ASHLEY  famotidine, 20 mg, BID  fluconazole, 200 mg, Daily  gabapentin, 300 mg, TID  levalbuterol, 1.25 mg, TID   And  ipratropium, 0.5 mg, TID  levofloxacin, 250 mg, Q24H [facility]  melatonin, 3 mg, HS  metoprolol tartrate, 25 mg, Q12H [facility]  nicotine, 7 mg, Daily  oxyCODONE, 5 mg, Q8H  potassium chloride, 40 mEq, Once  QUEtiapine, 50 mg, HS  riTUXimab (RITUXAN) 700 mg in sodium chloride 0.9 % 280 mL subsequent titrated chemo infusion, 700 mg, Once  senna, 8.8 mg, HS  sertraline, 75 mg, Daily  sulfamethoxazole-trimethoprim, 1 tablet, Once per day on Mon Wed Fri      acetaminophen, 650 mg, Q6H PRN  alteplase, 2 mg, Q1MIN PRN  alteplase, 2 mg, Q1MIN PRN  guaiFENesin, 200 mg, Q6H PRN  hydrALAZINE, 5 mg, Q6H PRN  ondansetron, 4 mg, Q8H PRN  ondansetron, 4 mg, Q8H PRN  oxyCODONE, 2.5 mg, Q6H PRN  polyethylene glycol, 17 g, Daily PRN  sodium chloride, 4 mL, Q6H PRN       Continuous          Labs:    CBC    Recent Labs     10/19/23  0447 10/20/23  0407   WBC 10.60* 7.26   HGB 9.4* 10.1*   HCT 29.0* 32.4*   * 516*   BANDSPCT 1  --      BMP    Recent Labs     10/19/23  0448 10/20/23  0407   SODIUM 137 137   K 4.1 3.8   CL 99 103   CO2 30 27   AGAP 8 7   BUN 52* 38*   CREATININE 1.22 0.91   CALCIUM 9.5 8.8       Coags    No recent results     Additional Electrolytes  Recent Labs     10/20/23  0407   MG 2.1   PHOS 4.3*          Blood Gas    No recent results  No recent results LFTs  Recent Labs     10/19/23  0448 10/20/23  0407   ALT 27 21   AST 14 12*   ALKPHOS 93 79 ALB 3.0* 2.8*   TBILI 0.59 0.31       Infectious  No recent results  Glucose  Recent Labs     10/19/23  0448 10/20/23  0407   GLUC 91 132                   Arnett Scheuermann, MD

## 2023-10-20 NOTE — PLAN OF CARE
Problem: OCCUPATIONAL THERAPY ADULT  Goal: Performs self-care activities at highest level of function for planned discharge setting. See evaluation for individualized goals. Description: Treatment Interventions: ADL retraining, Functional transfer training, Endurance training, Patient/family training, Equipment evaluation/education, Compensatory technique education, Activityengagement, Energy conservation          See flowsheet documentation for full assessment, interventions and recommendations. Outcome: Progressing  Note: Limitation: Decreased ADL status, Decreased UE strength, Decreased cognition, Decreased Safe judgement during ADL, Decreased endurance, Decreased self-care trans, Decreased high-level ADLs (+ pain, poor trunk control, balance, functional reach, activity tolerance)  Prognosis: Good  Assessment: Pt seen at bedside for OT tx session focused on participation in meaningful ADL tasks to improve overall mood as well as increase independence in thewse areas. Pt initially tearful and RN noted pt to be "sad" on this date. Support from this writer provided and pt reporting mood to be much improved by the end of session. Pt continuing to make gains in all areas- noted pt met toileting goal and therefore goal upgraded on this date as pt demonstrated ability to carry out task at a (S) level. Overall, pt at a (S) levle for all phases of ADL transfers and pt's many lines seem to be major limiting factor at this time. Pt with improved activity tolerance noted and tolerated session, as well as sitting OOB in chair well. Recommend pt to continued to be OOB often and transfer to/from [facility] to continue to improve overall activity tolerance for daily tasks. Pt remains appropriate for OT services. Will continue to follow during hospital course to address deficits and goals listed. Recommend d/c to rehab when medically stable/cleared.      OT Discharge Recommendation: Post acute rehabilitation services

## 2023-10-20 NOTE — PHYSICAL THERAPY NOTE
Physical Therapy Treatment Note    Patient's Name: Cornelio Alegre  : 1955     10/20/23 1132   PT Last Visit   PT Visit Date 10/20/23   Note Type   Note Type Treatment   Pain Assessment   Pain Assessment Tool FLACC   Pain Rating: FLACC (Rest) - Face 0   Pain Rating: FLACC (Rest) - Legs 0   Pain Rating: FLACC (Rest) - Activity 0   Pain Rating: FLACC (Rest) - Cry 0   Pain Rating: FLACC (Activity) - Face 1   Pain Rating: FLACC (Activity) - Legs 0   Pain Rating: FLACC (Activity) - Activity 0   Pain Rating: FLACC (Activity) - Cry 0   Pain Rating: FLACC (Activity) - Consolability 0   Score: FLACC (Activity) 1   Restrictions/Precautions   Weight Bearing Precautions Per Order No   Other Precautions Chair Alarm; Bed Alarm;O2;Fall Risk;Pain  (trach to high flow, PEG tube feed,)   General   Chart Reviewed Yes   Response to Previous Treatment Patient reporting fatigue but able to participate. Family/Caregiver Present No   Cognition   Following Commands Follows one step commands without difficulty   Comments pt ID by wristband, name and    Subjective   Subjective pt supine in bed, agreeable to PT treatment,   Bed Mobility   Supine to Sit 5  Supervision   Additional items Increased time required;Verbal cues   Transfers   Sit to Stand 5  Supervision   Additional items Assist x 1; Increased time required   Stand to Sit 5  Supervision   Additional items Assist x 1; Increased time required;Verbal cues   Stand pivot 5  Supervision   Additional items Assist x 1; Increased time required;Verbal cues   Ambulation/Elevation   Ambulation/Elevation Additional Comments pt declines ambulation   Balance   Static Sitting Good   Dynamic Sitting Fair   Static Standing Fair   Dynamic Standing Poor +   Activity Tolerance   Activity Tolerance Patient limited by fatigue   Medical Staff Made Aware care coordinated with NATO ariza, secondary to poor activity tolerance, quickly fatigues, significant co morbidites and medical status   Nurse Made Aware TINO garcia pre/post   Assessment   Prognosis Fair   Problem List Decreased strength;Decreased endurance; Impaired balance;Decreased mobility; Impaired judgement;Decreased safety awareness; Obesity; Decreased skin integrity;Pain   Assessment Pt continued to perform all functional mobility at level of supervision. Pt declines ambulation due to desire to sit in bed side chair. Pt does remain limited in her overall activity tolerance as well as decreased strength, endurace and functional mobility, pt will continue to benefit from skilled PT to address functional deficits and improve current level of function. Pt POC date extended this session as goals remain appropriate and discharge reccomendation   Goals   STG Expiration Date 10/30/23   Short Term Goal #1 Patient will: Increase bilateral LE strength 1/2 grade to facilitate independent mobility, Perform all bed mobility tasks w/ minx1 to improve pt's independence w/ repositioning for decrease risk of skin breakdown, Perform all transfers w/ minx1 consistently from various height surfaces in order to improve I w/ engagement w/ real-world environments/situations, Ambulate at least 10+ ft. with roller walker w/ minx1 w/o LOB to facilitate return and engagement w/ previous living environment, Increase ambulatory and static and dynamic standing balance 1 grade to decrease risk for falls, Tolerate at least 15 consecutive minutes of activity to demonstrate improved activity tolerance and endurance and Tolerate 3 hr OOB to faciliate upright tolerance   PT Treatment Day 4   Plan   Treatment/Interventions ADL retraining;LE strengthening/ROM; Functional transfer training;Elevations; Therapeutic exercise;Cognitive reorientation;Patient/family training;Equipment eval/education; Bed mobility;Gait training;Spoke to nursing;Spoke to case management;OT   Progress Progressing toward goals   PT Frequency 2-3x/wk   Discharge Recommendation   PT Discharge Recommendation Post acute rehabilitation services   AM-PAC Basic Mobility Inpatient   Turning in Flat Bed Without Bedrails 3   Lying on Back to Sitting on Edge of Flat Bed Without Bedrails 3   Moving Bed to Chair 3   Standing Up From Chair Using Arms 3   Walk in Room 3   Climb 3-5 Stairs With Railing 1   Basic Mobility Inpatient Raw Score 16   Basic Mobility Standardized Score 38.32   Highest Level Of Mobility   JH-HLM Goal 5: Stand one or more mins   JH-HLM Achieved 5: Stand (1 or more minutes)   Education   Education Provided Mobility training   Patient Reinforcement needed   End of Consult   Patient Position at End of Consult Bedside chair;Bed/Chair alarm activated; All needs within reach     The patient's AM-PAC Basic Mobility Inpatient Short Form Raw Score is 16. A Raw score of less than or equal to 16 suggests the patient may benefit from discharge to post-acute rehabilitation services. Please also refer to the recommendation of the Physical Therapist for safe discharge planning.     Segundo Patel, PT

## 2023-10-20 NOTE — PLAN OF CARE
Problem: PHYSICAL THERAPY ADULT  Goal: Performs mobility at highest level of function for planned discharge setting. See evaluation for individualized goals. Description: Treatment/Interventions: Functional transfer training, LE strengthening/ROM, Elevations, Therapeutic exercise, Endurance training, Patient/family training, Equipment eval/education, Bed mobility, Gait training, Spoke to nursing, Spoke to case management          See flowsheet documentation for full assessment, interventions and recommendations. Note: Prognosis: Fair  Problem List: Decreased strength, Decreased endurance, Impaired balance, Decreased mobility, Impaired judgement, Decreased safety awareness, Obesity, Decreased skin integrity, Pain  Assessment: Pt continued to perform all functional mobility at level of supervision. Pt declines ambulation due to desire to sit in bed side chair. Pt does remain limited in her overall activity tolerance as well as decreased strength, endurace and functional mobility, pt will continue to benefit from skilled PT to address functional deficits and improve current level of function. Pt POC date extended this session as goals remain appropriate and discharge reccomendation  Barriers to Discharge: Inaccessible home environment, Decreased caregiver support (Pt is at home alone during the day; + PAUL her home)     PT Discharge Recommendation: Post acute rehabilitation services    See flowsheet documentation for full assessment.

## 2023-10-20 NOTE — SPEECH THERAPY NOTE
Attempted VBS however patient initially receiving chemo, then on commode, then Flouro not available. D/W MD and RN- Will f/u to complete as able.     Melisa Bowser M.S., [facility]  Speech Language Pathologist   Available via [address] [place] [address] #[id number]  [place] #[id number]

## 2023-10-20 NOTE — OCCUPATIONAL THERAPY NOTE
Occupational Therapy Treatment Note      Rocky [place]    10/20/2023    Principal Problem:    Acute respiratory failure with hypoxia secondary to oropharyngeal mass  Active Problems:    Benign essential hypertension    Pain syndrome, chronic    CKD (chronic kidney disease) stage 3, GFR 30-59 ml/min (HCC)    (HFpEF) heart failure with preserved ejection fraction (HCC)    Ambulatory dysfunction    Angioedema    Oropharyngeal mass    Blister (nonthermal) of left forearm, sequela    Large cell lymphoma (HCC)    Chronic Superficial thrombophlebitis    Generalized abdominal pain    Depression      Past Medical History:   Diagnosis Date    Anxiety     Depression     Fatty liver     Hyperlipidemia     Hypertension     Psychiatric disorder     Varicella        Past Surgical History:   Procedure Laterality Date    BREAST SURGERY Bilateral     Reduction Procedure    CARDIAC SURGERY       SECTION      x4    DILATION AND CURETTAGE OF UTERUS      ECTOPIC PREGNANCY SURGERY      IR GASTROSTOMY TUBE PLACEMENT  2023    IR TUNNELED CENTRAL LINE PLACEMENT  10/19/2023     [address] INCL FLUOR GDNCE DX W/CELL WASHG SPX N/A 2023    Procedure: BRONCHOSCOPY FLEXIBLE;  Surgeon: Cadence Maloney MD;  [facility];  Service: ENT    TRACHEOSTOMY N/A 2023    Procedure: TRACHEOSTOMY, biopsy of nasopharynx mass;  Surgeon: Cadence Maloney MD;  [facility];  Service: ENT        10/20/23 1123   OT Last Visit   OT Visit Date 10/20/23   Note Type   Note Type Treatment   Pain Assessment   Pain Assessment Tool FLACC   Pain Rating: FLACC (Rest) - Face 0   Pain Rating: FLACC (Rest) - Legs 0   Pain Rating: FLACC (Rest) - Activity 0   Pain Rating: FLACC (Rest) - Cry 0   Pain Rating: FLACC (Rest) - Consolability 0   Score: FLACC (Rest) 0   Pain Rating: FLACC (Activity) - Face 0   Pain Rating: FLACC (Activity) - Legs 0   Pain Rating: FLACC (Activity) - Activity 0   Pain Rating: FLACC (Activity) - Cry 0   Pain Rating: FLACC (Activity) - Consolability 0   Score: FLACC (Activity) 0   Restrictions/Precautions   Other Precautions Chair Alarm; Bed Alarm;Telemetry;O2;Aspiration;Multiple lines  (trach collar, tube feed, tele)   Lifestyle   Intrinsic Gratification pt enjoyed talking about family and this writer's children. Pt also noted to be rachel driven. ADL   Grooming Assistance 6  Modified Independent   Grooming Deficit Wash/dry face; Teeth care;Setup   Grooming Comments Pt setup to wash face and expressing MUCH appreciation of this 2' pt tearful at the start of session due to being "over it" (re: being in the hospital). UB Bathing Assistance 5  Supervision/Setup   UB Bathing Deficit Setup   LB Bathing Assistance Unable to assess  (pt declined LB bathing and reports staff already assisted with this.)   UB Dressing Assistance 4  [address]  5  Supervision/Setup   Toileting Deficit Perineal hygiene; Bedside commode; Increased time to complete;Setup;Supervison/safety   Toileting Comments management of lines only. Pt completed german-care with (S) only in standing. Pt forward flexed while completing the latter in standing. Functional Standing Tolerance   Time 20 sec for german care only and then pt transferred to chair. Bed Mobility   Supine to Sit 5  Supervision   Additional items Verbal cues; Assist x 1; Increased time required; Bedrails;HOB elevated   Additional Comments assist with line management only. Transfers   Sit to Stand 5  Supervision  (close (S)/CGA)   Additional items Increased time required  (inc'd time 2' line management needed.)   Stand to Sit 5  Supervision   Additional items Armrests; Increased time required   Additional Comments assist with lines for all phases of mobility   Subjective   Subjective "I don't mean to be grumpy with you. I am just tired of all of this."   Cognition   Overall Cognitive Status [facility]   Arousal/Participation Alert; Cooperative   Attention Within functional limits Orientation Level Oriented X4   Memory Within functional limits   Following Commands Follows multistep commands without difficulty   Comments Pt verified ID by stating name and    Assessment   Assessment Pt seen at bedside for OT tx session focused on participation in meaningful ADL tasks to improve overall mood as well as increase independence in thewse areas. Pt initially tearful and RN noted pt to be "sad" on this date. Support from this writer provided and pt reporting mood to be much improved by the end of session. Pt continuing to make gains in all areas- noted pt met toileting goal and therefore goal upgraded on this date as pt demonstrated ability to carry out task at a (S) level. Overall, pt at a (S) levle for all phases of ADL transfers and pt's many lines seem to be major limiting factor at this time. Pt with improved activity tolerance noted and tolerated session, as well as sitting OOB in chair well. Recommend pt to continued to be OOB often and transfer to/from [facility] to continue to improve overall activity tolerance for daily tasks. Pt remains appropriate for OT services. Will continue to follow during hospital course to address deficits and goals listed. Recommend d/c to rehab when medically stable/cleared. Plan   Treatment Interventions ADL retraining;Functional transfer training; Endurance training;Patient/family training;Equipment evaluation/education; Compensatory technique education; Activityengagement; Energy conservation   Goal Expiration Date 10/26/23   OT Frequency 3-5x/wk   Discharge Recommendation   OT Discharge Recommendation Post acute rehabilitation services   AM-PAC Daily Activity Inpatient   Lower Body Dressing 3   Bathing 3   Toileting 3   Upper Body Dressing 3   Grooming 3   Eating 1   Daily Activity Raw Score 16   Daily Activity Standardized Score (Calc for Raw Score >=11) 35.96   AM-PAC Applied Cognition Inpatient   Following a Speech/Presentation 4   Understanding Ordinary Conversation 4   Taking Medications 3   Remembering Where Things Are Placed or Put Away 3   Remembering List of 4-5 Errands 3   Taking Care of Complicated Tasks 3   Applied Cognition Raw Score 20   Applied Cognition Standardized Score 41.76   End of Consult   Education Provided Yes   Patient Position at End of Consult Bedside chair;Bed/Chair alarm activated; All needs within reach   Nurse Communication Nurse aware of consult      GOALS:   Goals updated following re-evaluation in order to promote pt's established goal of going home     -Patient will perform grooming tasks in stance with overall Mod I in order to increase overall independence PROGRESSING, mod (I) for oral care. Recommend address hair care in f/u visit     -Patient will be supervision with UB dressing using AE and AD as needed in order to increase (I) with ADLs PROGRESSING     -Patient will be supervision  with UB bathing using AE and AD as needed in order to increase (I) with ADLs     -Patient will be Min A with LB dressing with use of AE and AD as needed in order to increase (I) with ADLs MET: upgrade to mod I     -Patient will be Min A with LB bathing with use of AE and AD as needed in order to increase (I) with ADLs     -Patient will complete toileting w/ supervision w/ G hygiene/thoroughness in order to reduce caregiver burden -MET; upgrade to mod (I)     -Patient will demonstrate supervision with bed mobility for ability to manage own comfort and initiate OOB tasks. MET: upgrade to mod I     -Patient will perform functional transfers with supervision to/from all surfaces using DME as needed in order to increase (I) with functional tasks MET: upgrade to mod I     -Patient will be supervision with functional mobility to/from bathroom for increased independence with toileting tasks- MET; upgrade to mod (I)     -Patient will independently integrate one pacing strategy into morning ADL's.  PROGRESSING     -Patient will demonstrate standing for 3 min in order to increase active participation in functional activities [id number]

## 2023-10-20 NOTE — PROGRESS NOTES
Hematology Medical Oncology Note     Karoline Krishnamurthy to see Ms. Ross. She received rituximab infusion today. She has now completed cycle 2 of dose adjusted R-EPOCH. Labs reviewed and okay. I was told that she was planning on leaving AMA and was angry at this time. Was suggested that I did not go and talk to her as they were awaiting their attending to go have further discussions. They stated they will keep me informed was going on. I asked him to explain the patient that her blood counts will start dropping and she is at risk for developing neutropenia and neutropenic fever and sepsis. 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 I provided the team with the number for the HOPE line so that she could schedule follow-up care with medical oncology. Following this interaction, I just received word that patient will be staying until Monday. I ordered Granix support for her neutrophils. We will ask [place] to set up follow-up appointment with medical oncology as soon as possible.     Buffy Narayanan MD, PhD

## 2023-10-21 LAB
ALBUMIN SERPL BCP-MCNC: 2.8 G/DL (ref 3.5–5)
ALP SERPL-CCNC: 78 U/L (ref 34–104)
ALT SERPL W P-5'-P-CCNC: 17 U/L (ref 7–52)
ANION GAP SERPL CALCULATED.3IONS-SCNC: 5 MMOL/L
AST SERPL W P-5'-P-CCNC: 10 U/L (ref 13–39)
BASOPHILS # BLD AUTO: 0.05 THOUSANDS/ÂΜL (ref 0–0.1)
BASOPHILS NFR BLD AUTO: 0 % (ref 0–1)
BILIRUB SERPL-MCNC: 0.32 MG/DL (ref 0.2–1)
BUN SERPL-MCNC: 34 MG/DL (ref 5–25)
CA-I BLD-SCNC: 1.26 MMOL/L (ref 1.12–1.32)
CALCIUM ALBUM COR SERPL-MCNC: 10.1 MG/DL (ref 8.3–10.1)
CALCIUM SERPL-MCNC: 9.1 MG/DL (ref 8.4–10.2)
CHLORIDE SERPL-SCNC: 103 MMOL/L (ref 96–108)
CO2 SERPL-SCNC: 27 MMOL/L (ref 21–32)
CREAT SERPL-MCNC: 0.98 MG/DL (ref 0.6–1.3)
EOSINOPHIL # BLD AUTO: 0.15 THOUSAND/ÂΜL (ref 0–0.61)
EOSINOPHIL NFR BLD AUTO: 1 % (ref 0–6)
ERYTHROCYTE [DISTWIDTH] IN BLOOD BY AUTOMATED COUNT: 15.3 % (ref 11.6–15.1)
GFR SERPL CREATININE-BSD FRML MDRD: 58 ML/MIN/1.73SQ M
GLUCOSE SERPL-MCNC: 133 MG/DL (ref 65–140)
HCT VFR BLD AUTO: 25.1 % (ref 34.8–46.1)
HGB BLD-MCNC: 7.9 G/DL (ref 11.5–15.4)
IMM GRANULOCYTES # BLD AUTO: >0.5 THOUSAND/UL (ref 0–0.2)
IMM GRANULOCYTES NFR BLD AUTO: 7 % (ref 0–2)
LYMPHOCYTES # BLD AUTO: 0.5 THOUSANDS/ÂΜL (ref 0.6–4.47)
LYMPHOCYTES NFR BLD AUTO: 3 % (ref 14–44)
MAGNESIUM SERPL-MCNC: 2.2 MG/DL (ref 1.9–2.7)
MCH RBC QN AUTO: 30.7 PG (ref 26.8–34.3)
MCHC RBC AUTO-ENTMCNC: 31.5 G/DL (ref 31.4–37.4)
MCV RBC AUTO: 98 FL (ref 82–98)
MONOCYTES # BLD AUTO: 0.09 THOUSAND/ÂΜL (ref 0.17–1.22)
MONOCYTES NFR BLD AUTO: 1 % (ref 4–12)
NEUTROPHILS # BLD AUTO: 14.39 THOUSANDS/ÂΜL (ref 1.85–7.62)
NEUTS SEG NFR BLD AUTO: 88 % (ref 43–75)
NRBC BLD AUTO-RTO: 0 /100 WBCS
PHOSPHATE SERPL-MCNC: 3 MG/DL (ref 2.3–4.1)
PLATELET # BLD AUTO: 598 THOUSANDS/UL (ref 149–390)
PMV BLD AUTO: 10 FL (ref 8.9–12.7)
POTASSIUM SERPL-SCNC: 4.7 MMOL/L (ref 3.5–5.3)
PROT SERPL-MCNC: 5.2 G/DL (ref 6.4–8.4)
RBC # BLD AUTO: 2.57 MILLION/UL (ref 3.81–5.12)
SODIUM SERPL-SCNC: 135 MMOL/L (ref 135–147)
URATE SERPL-MCNC: 3.6 MG/DL (ref 2–7.5)
WBC # BLD AUTO: 16.24 THOUSAND/UL (ref 4.31–10.16)

## 2023-10-21 PROCEDURE — 85025 COMPLETE CBC W/AUTO DIFF WBC: CPT | Performed by: INTERNAL MEDICINE

## 2023-10-21 PROCEDURE — 94003 VENT MGMT INPAT SUBQ DAY: CPT

## 2023-10-21 PROCEDURE — 84100 ASSAY OF PHOSPHORUS: CPT | Performed by: INTERNAL MEDICINE

## 2023-10-21 PROCEDURE — 82330 ASSAY OF CALCIUM: CPT | Performed by: INTERNAL MEDICINE

## 2023-10-21 PROCEDURE — 94760 N-INVAS EAR/PLS OXIMETRY 1: CPT

## 2023-10-21 PROCEDURE — 84550 ASSAY OF BLOOD/URIC ACID: CPT | Performed by: INTERNAL MEDICINE

## 2023-10-21 PROCEDURE — 80053 COMPREHEN METABOLIC PANEL: CPT | Performed by: INTERNAL MEDICINE

## 2023-10-21 PROCEDURE — 83735 ASSAY OF MAGNESIUM: CPT | Performed by: INTERNAL MEDICINE

## 2023-10-21 PROCEDURE — 94640 AIRWAY INHALATION TREATMENT: CPT

## 2023-10-21 PROCEDURE — 99233 SBSQ HOSP IP/OBS HIGH 50: CPT | Performed by: INTERNAL MEDICINE

## 2023-10-21 PROCEDURE — 94150 VITAL CAPACITY TEST: CPT

## 2023-10-21 RX ADMIN — GABAPENTIN 300 MG: 300 CAPSULE ORAL at 20:43

## 2023-10-21 RX ADMIN — NICOTINE 7 MG: 7 PATCH, EXTENDED RELEASE TRANSDERMAL at 09:00

## 2023-10-21 RX ADMIN — BUSPIRONE HYDROCHLORIDE 30 MG: 10 TABLET ORAL at 08:07

## 2023-10-21 RX ADMIN — FLUCONAZOLE 200 MG: 100 TABLET ORAL at 08:06

## 2023-10-21 RX ADMIN — GABAPENTIN 300 MG: 300 CAPSULE ORAL at 16:59

## 2023-10-21 RX ADMIN — OXYCODONE HYDROCHLORIDE 5 MG: 5 SOLUTION ORAL at 05:05

## 2023-10-21 RX ADMIN — LEVALBUTEROL HYDROCHLORIDE 1.25 MG: 1.25 SOLUTION RESPIRATORY (INHALATION) at 07:53

## 2023-10-21 RX ADMIN — BUSPIRONE HYDROCHLORIDE 30 MG: 10 TABLET ORAL at 16:59

## 2023-10-21 RX ADMIN — IPRATROPIUM BROMIDE 0.5 MG: 0.5 SOLUTION RESPIRATORY (INHALATION) at 14:55

## 2023-10-21 RX ADMIN — ACYCLOVIR 400 MG: 200 CAPSULE ORAL at 08:07

## 2023-10-21 RX ADMIN — LEVALBUTEROL HYDROCHLORIDE 1.25 MG: 1.25 SOLUTION RESPIRATORY (INHALATION) at 19:45

## 2023-10-21 RX ADMIN — MELATONIN 3 MG: at 22:46

## 2023-10-21 RX ADMIN — FAMOTIDINE 20 MG: 20 TABLET, FILM COATED ORAL at 08:06

## 2023-10-21 RX ADMIN — ACYCLOVIR 400 MG: 200 CAPSULE ORAL at 17:00

## 2023-10-21 RX ADMIN — OXYCODONE HYDROCHLORIDE 5 MG: 5 SOLUTION ORAL at 20:43

## 2023-10-21 RX ADMIN — SERTRALINE 75 MG: 25 TABLET, FILM COATED ORAL at 08:07

## 2023-10-21 RX ADMIN — FILGRASTIM-AAFI 300 MCG: 300 INJECTION, SOLUTION SUBCUTANEOUS at 09:08

## 2023-10-21 RX ADMIN — FAMOTIDINE 20 MG: 20 TABLET, FILM COATED ORAL at 17:00

## 2023-10-21 RX ADMIN — CHLORHEXIDINE GLUCONATE 15 ML: 1.2 SOLUTION ORAL at 20:43

## 2023-10-21 RX ADMIN — ACETAMINOPHEN 650 MG: 325 TABLET, FILM COATED ORAL at 08:05

## 2023-10-21 RX ADMIN — QUETIAPINE FUMARATE 50 MG: 25 TABLET ORAL at 22:46

## 2023-10-21 RX ADMIN — BUSPIRONE HYDROCHLORIDE 30 MG: 10 TABLET ORAL at 20:43

## 2023-10-21 RX ADMIN — LEVALBUTEROL HYDROCHLORIDE 1.25 MG: 1.25 SOLUTION RESPIRATORY (INHALATION) at 14:55

## 2023-10-21 RX ADMIN — CHLORHEXIDINE GLUCONATE 15 ML: 1.2 SOLUTION ORAL at 09:05

## 2023-10-21 RX ADMIN — IPRATROPIUM BROMIDE 0.5 MG: 0.5 SOLUTION RESPIRATORY (INHALATION) at 19:45

## 2023-10-21 RX ADMIN — GABAPENTIN 300 MG: 300 CAPSULE ORAL at 08:06

## 2023-10-21 RX ADMIN — LEVOFLOXACIN 250 MG: 250 TABLET, FILM COATED ORAL at 08:06

## 2023-10-21 RX ADMIN — AMLODIPINE BESYLATE 10 MG: 5 TABLET ORAL at 08:06

## 2023-10-21 RX ADMIN — Medication 8.8 MG: at 22:46

## 2023-10-21 RX ADMIN — OXYCODONE HYDROCHLORIDE 5 MG: 5 SOLUTION ORAL at 12:53

## 2023-10-21 RX ADMIN — IPRATROPIUM BROMIDE 0.5 MG: 0.5 SOLUTION RESPIRATORY (INHALATION) at 07:53

## 2023-10-21 RX ADMIN — METOPROLOL TARTRATE 25 MG: 25 TABLET, FILM COATED ORAL at 08:06

## 2023-10-21 RX ADMIN — ENOXAPARIN SODIUM 40 MG: 40 INJECTION SUBCUTANEOUS at 09:08

## 2023-10-21 NOTE — PROGRESS NOTES
[facility]  Progress Note  Name: Radha Garcia  MRN: [id number]  Unit/Bed#: ICU 03 I Date of Admission: 9/13/2023   Date of Service: 10/21/2023 I Hospital Day: 45    Assessment/Plan   * Acute respiratory failure with hypoxia secondary to oropharyngeal mass  Assessment & Plan  Cuffless trach placed 9/17, transitioned to cuffed on 10/3. Oxygen requirements not improving. For mental health patient is on buspirone, quetiapine, and sertraline. For pain, gabapentin, oxycodone scheduled and prn, prn oxycodone mainly utilized for increased pain during chemo and after a bronch. On Guaifenesin, Atrovent and Xopenex for airway maintenance. No improvement in bilateral consolidation or atelectasis and groundglass opacities on repeat CT and chest x-rays. Patient not receiving proper positive pressure to open up atelectatic lung. Bronchial cx with 3 colonies CoNS. PCP PCR invalid, repeat negative. CTCAP indicates additional groundglass opacity with paraseptal emphysema, which may be contributing to inability to remain off vent. SBTs have been unsuccessful to place patient on trach collar since return to ICU. Patient tolerated high flow all day and overnight. Back on vent overnight, failed trach collar o/n. CXR 10/19 appears unchanged from 10/16 just not good breath during image, less of right lung visualized this time. Did well overnight on 6/8 consider CPAP vs overnight trach collar trial    Plan:  Maintain cuff overnight from 9 pm to 6 am for positive pressure, can open cuff during the day for patient to speak  6/8 tonight, can attempt CPAP next    Large cell lymphoma ([address])  Assessment & Plan  Large B-cell lymphoma, stage II. First chemo cycle 9/22 4 days of R-EPOCH. 9/27 Rituxan. 9/28 Filgrastim. Acyclovir, Zyloprim, Diflucan, Levaquin, Zofran, Bactrim for chemo prophylaxis, all discontinued with heme-onc's approval as Nafisa reached 4200. I suspect leukocytosis is secondary to the filgrastim. Restarted all ppx 10/13 for [facility] cycle 2 which will run for the next 4 days. Ppx and filgrastim can be discontinued when patient is no longer neutropenic after this cycle again. Platelets have been up trending to 696, which did not happen during cycle 1. This could simply be reactive from inflammation, but could also occur from vincristine which is part of current regimen in combination with the residual effect from filgastrim. Likely just reactive but if continues to rise consider checking blood smear, ferritin, ESR, CRP, Jak2, CALR, MPL, BCR-ABL1. Glucose is increasing on past 2 morning draws, appears to be 2/2 prednisolone from regimen. PICC came out with 3 hours chemo remaining. Tunneled line in R IJ placed yesterday and received cytoxan. Today benadryl with rituxan. Neutropenic precautions will be needed in a few days    Plan:  Keep Hgb>7 and Plt>20 for chemo  See acute respiratory failure above    Oropharyngeal mass  Assessment & Plan  9/14 Neck/ soft tissue CT: Large enhancing soft tissue mass identified within the left neck involving multiple spaces. Centered within the left oropharynx with extensions as described above into multiple spaces. This results in significant airway compromise. 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 Small level 3/level 4 nodes are seen within the neck. Tracheostomy by ENT. Replaced on 9/26. H/o tobacco abuse, daily smoker. On nicotine while inpatient, confirmed with patient she needs nicotine patch. CT soft tissue neck shows reduced mass effect from 9/14 to 10/13.  Can consider reducing trach size if continues to improve    Plan:  ENT and heme onc following  Will titrate NRT weekly  See acute respiratory failure and large B cell lymphoma above      (HFpEF) heart failure with preserved ejection fraction [facility])  Assessment & Plan  Wt Readings from Last 3 Encounters:   10/20/23 76.5 kg (168 lb 10.4 oz)   09/07/23 74.6 kg (164 lb 6.4 oz)   08/30/23 73.3 kg (161 lb 9.6 oz) Lab Results   Component Value Date    LVEF 60 08/28/2023     (H) 09/29/2023     (H) 09/13/2023     Echo 8/28/23: LVEF 60%. Plan:  K > 4   Measure I/O      Angioedema  Assessment & Plan  POA in [facility] ED: Swollen tongue, soft and hard palate with severe angioedema. Decadron 10 mg, Benadryl 25 mg given. 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 Plan:  Cuffless trach 9/17, cuffed Shiley XLT #7 10/3  See acute respiratory failure and oropharyngeal mass above  Continue to wean vent. Ambulatory dysfunction  Assessment & Plan  2/2 Impacted by chronic pain syndrome + prolonged hospital stay. Continue OOB with PT. PT rec post acute rehab however reportedly Cannot get LTACH placement while getting chemo per CM  She is aware STR SNFs continue to follow and treatment can be done from a SNF level of care. PT would need to be on trach collar for at least 48 hours with no need for the vent. Aware that pt would need to be around 28% FiO2     Pain syndrome, chronic  Assessment & Plan  Home regimen: Oxycodone 5 mg BID, Tylenol 650 mg q8 prn. Gabapentin 600 mg TID. Medical marijuana. Lumbar radiculopathy and impaired ambulatory dysfunction secondary to pain. Has bowel regimen and is having bowel movements daily. Patient required additional pain management during previous cycle and has some additional pain from tunneled line placement    Plan:  Continue gabapentin 300 mg TID, oxycodone 5 mg TID, prn Tylenol 650 mg q6. Oxycodone 2.5 mg prn can d/c a day after tunneled line replaced    Benign essential hypertension  Assessment & Plan  Home regimen Coreg 12.5 mg BID, Amlodipine 10 mg daily, and lisinopril 40 mg. All discontinued at this time secondary to hypotension and PO since cycle 1. but stable currently without any antihypertensives. Systolic persistently elevated and tachycardic, potentially secondary to pain. Patient was more hypotensive during last chemo.  Coreg contraindicated during chemo, since cycles are every other week, would be better to stick with lopressor during duration of therapy as opposed to switching constantly. Plan:  Monitor vitals. Continue Norvasc 10 and lopressor 25 bid  Prn hydralazine for SBP > 160    Depression  Assessment & Plan  Patient on Zoloft 75 daily and Seroquel hs  Patient is depressed, multiple family/palliative discussions. atient is firm that she wants to live and to fight, she is just tired. Currently goal is disease treatment oriented. Full code. No SI/HI ideations. Palliative signed off, will reconsult if patient changes her mind regarding wishes. Generalized abdominal pain  Assessment & Plan  Patient reports abdominal pain which is unchanged since 9/22 no guarding on exam. Takes Famotidine for GERD at home. Alk phos 10/2 145, 10/20 79. CTCAP reveals complex right upper pole renal lesion requiring possible outpatient MRI    - Continue Famotidine  - On bowel regimen with Senna and Miralax prn    Chronic Superficial thrombophlebitis  Assessment & Plan  Right forearm soreness. RUE US: No acute or chronic deep vein thrombosis. 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 PO not severe enough to require renal dosing at this time, will continue to monitor and adjust dosing as needed.      -Continue DVT ppx with Lovenox 40    Blister (nonthermal) of left forearm, sequela  Assessment & Plan  Blisters of left upper extremity healing, no signs of infection, continue daily wound care. CKD (chronic kidney disease) stage 3, GFR 30-59 ml/min [facility])  Assessment & Plan  Lab Results   Component Value Date    EGFR 64 10/20/2023    EGFR 44 10/19/2023    EGFR 65 10/18/2023    CREATININE 0.91 10/20/2023    CREATININE 1.22 10/19/2023    CREATININE 0.89 10/18/2023     Baseline Cr between 0.75-1.1  Initial PO felt 2/2 prerenal azotemia 2/2 diuresis, reduced PO intake +/- ATN.  Patient received 2 doses of Bumex IV 2 g as she had not been responding appropriately to IV 40 Lasix daily. Creatinine went up by 0.5 meeting criteria for an HERIBERTO. This may be prerenal intrinsic or postrenal.  Potential prerenal causes include reduced kidney perfusion due to lisinopril. Intrinsic can also be lisinopril due to ATN, AIN from chemo medications, TLS, crystaline nephropathy from acyclovir, rituxan nephrotoxicity, filgrastim glomerulonephritis. Post renal obstructive could be from urine retention from vincristine or cyclophosphamide bladder fibrosis. Suspect most likely due to medications as a combination of prerenal and intrinsic. FENa was 0.2%, UA shows no casts or signs of infection, urine eosinophils 0%. Patient likely has prerenal HERIBERTO from volume depletion. Received 2 L NS and 1 pRBC and cr went up by 0.07 still and Na and Cl went down, suspect that volume prevented further cr rise and free water excretion impaired by kidney function, allowing hyponatremia to increase. Creatinine increase is likely plateaued and went down the following day. Suspect that now that nephrotoxic medications have been stopped she will continue to respond well to Lasix and creatinine will continue to downtrend. HERIBERTO now resolved. Will be cautious about nephrotoxins and monitor as restarting EPOCH and ppx. Patient gets volume depleted and overloaded very easily. Especially from chemo. 10/19 Heriberto as cr went up by 0.3 in 48 hours from 0.82 on 10/17 to 1.22 on 10/19. Suspect this is prerenal hypovolemic, will see if patient improves with fluids. She gets overloaded easily so if no improvement suspect CRS again. Received 2L bolus NS. Today cr down to 0.91, so was likely hypovolemic prerenal    Plan: Continue to monitor UO/renal function. Avoid nephrotoxic agents             ICU Core Measures     Vented Patient  VAP Bundle  VAP bundle ordered     A: Assess, Prevent, and Manage Pain Has pain been assessed? Yes  Need for changes to pain regimen?  No   B: Both Spontaneous Awakening Trials (SATs) and Spontaneous Breathing Trials (SBTs) Plan to perform spontaneous awakening trial today? N/A   Plan to perform spontaneous breathing trial today? Yes   Obvious barriers to extubation? NA   C: Choice of Sedation RASS Goal: 0 Alert and Calm  Need for changes to sedation or analgesia regimen? No   D: Delirium CAM-ICU: Negative   E: Early Mobility  Plan for early mobility? Yes   F: Family Engagement Plan for family engagement today? Yes       Antibiotic Review: acyclovir/fluconazole/bactrim on for post chemo prophylaxis d    Review of Invasive Devices:      Central access plan: Medications requiring central line      Prophylaxis:  VTE VTE covered by:  enoxaparin, Subcutaneous, 40 mg at 10/20/23 0902       Stress Ulcer  covered byfamotidine (PEPCID) tablet 20 mg [[id number]]       Subjective   HPI/24hr events: Davina apneic on trach collar hs and desated into 50% SpO2, prompting to be placed back on ventilator PSV 6/8. Review of Systems   Respiratory:  Negative for choking, shortness of breath, wheezing and stridor. All other systems reviewed and are negative. Objective                            Vitals I/O      Most Recent Min/Max in 24hrs   Temp 98.6 °F (37 °C) Temp  Min: 98.6 °F (37 °C)  Max: 99.2 °F (37.3 °C)   Pulse 101 Pulse  Min: 77  Max: 101   Resp 18 Resp  Min: 12  Max: 35   /55 BP  Min: 93/51  Max: 150/75   O2 Sat 99 % SpO2  Min: 90 %  Max: 100 %      Intake/Output Summary (Last 24 hours) at 10/21/2023 0707  Last data filed at 10/21/2023 0406  Gross per 24 hour   Intake 1442 ml   Output 679 ml   Net 763 ml         Diet Enteral/Parenteral; Tube Feeding No Oral Diet; Two Telly HN; Continuous; 38; 190; Water; Every 4 hours     Invasive Monitoring Physical exam   N/a Physical Exam  Eyes:      Pupils: Pupils are equal, round, and reactive to light. Skin:     General: Skin is warm and dry. HENT:      Head: Normocephalic. Nose: No congestion. Mouth/Throat:      Mouth:  Angioedema present. Comments: Dry mucus membranes. Shiley trache in place. No discharge/drainage  Cardiovascular:      Rate and Rhythm: Normal rate. Pulses: Normal pulses. Musculoskeletal:         General: Normal range of motion. Abdominal:      Palpations: Abdomen is soft. Constitutional:       General: She is not in acute distress. Appearance: She is well-nourished. Pulmonary:      Effort: Pulmonary effort is normal. No accessory muscle usage, respiratory distress or accessory muscle usage. Breath sounds: Normal breath sounds. Neurological:      General: No focal deficit present. Mental Status: She is alert and oriented to person, place and time. Motor: Strength full and intact in all extremities. Vitals and nursing note reviewed. Diagnostic Studies      EKG: NSR rate 82  Imaging: No new imaging I have personally reviewed pertinent reports.        Medications:  Scheduled PRN   acyclovir, 400 mg, BID  amLODIPine, 10 mg, Daily  busPIRone, 30 mg, TID  chlorhexidine, 15 mL, Q12H ASHLEY  enoxaparin, 40 mg, Q24H ASHLEY  famotidine, 20 mg, BID  Filgrastim-aafi, 300 mcg, Daily  fluconazole, 200 mg, Daily  gabapentin, 300 mg, TID  levalbuterol, 1.25 mg, TID   And  ipratropium, 0.5 mg, TID  levofloxacin, 250 mg, Q24H ASHLEY  melatonin, 3 mg, HS  metoprolol tartrate, 25 mg, Q12H [address]  nicotine, 7 mg, Daily  oxyCODONE, 5 mg, Q8H  QUEtiapine, 50 mg, HS  senna, 8.8 mg, HS  sertraline, 75 mg, Daily  sulfamethoxazole-trimethoprim, 1 tablet, Once per day on Mon Wed Fri      acetaminophen, 650 mg, Q6H PRN  alteplase, 2 mg, Q1MIN PRN  alteplase, 2 mg, Q1MIN PRN  guaiFENesin, 200 mg, Q6H PRN  hydrALAZINE, 5 mg, Q6H PRN  ondansetron, 4 mg, Q8H PRN  ondansetron, 4 mg, Q8H PRN  oxyCODONE, 2.5 mg, Q6H PRN  polyethylene glycol, 17 g, Daily PRN  sodium chloride, 4 mL, Q6H PRN       Continuous          Labs:    CBC    Recent Labs     10/20/23  0407 10/21/23  0506   WBC 7.26 16.24*   HGB 10.1* 7.9*   HCT 32.4* 25.1*   * 598*     BMP    Recent Labs     10/20/23  0407 10/21/23  0506   SODIUM 137 135   K 3.8 4.7    103   CO2 27 27   AGAP 7 5   BUN 38* 34*   CREATININE 0.91 0.98   CALCIUM 8.8 9.1       Coags    No recent results     Additional Electrolytes  Recent Labs     10/20/23  0407 10/21/23  0506   MG 2.1 2.2   PHOS 4.3* 3.0   CAIONIZED  --  1.26          Blood Gas    No recent results  No recent results LFTs  Recent Labs     10/20/23  0407 10/21/23  0506   ALT 21 17   AST 12* 10*   ALKPHOS 79 78   ALB 2.8* 2.8*   TBILI 0.31 0.32       Infectious  No recent results  Glucose  Recent Labs     10/20/23  0407 10/21/23  0506   GLUC 132 133               See attending attestation    Papito Gallegos PA-C

## 2023-10-22 ENCOUNTER — APPOINTMENT (INPATIENT)
Dept: RADIOLOGY | Facility: HOSPITAL | Age: 68
DRG: 004 | End: 2023-10-22
Payer: COMMERCIAL

## 2023-10-22 LAB
ALBUMIN SERPL BCP-MCNC: 3 G/DL (ref 3.5–5)
ALP SERPL-CCNC: 75 U/L (ref 34–104)
ALT SERPL W P-5'-P-CCNC: 16 U/L (ref 7–52)
ANION GAP SERPL CALCULATED.3IONS-SCNC: 5 MMOL/L
ANISOCYTOSIS BLD QL SMEAR: PRESENT
AST SERPL W P-5'-P-CCNC: 10 U/L (ref 13–39)
BASOPHILS # BLD MANUAL: 0 THOUSAND/UL (ref 0–0.1)
BASOPHILS NFR MAR MANUAL: 0 % (ref 0–1)
BILIRUB SERPL-MCNC: 0.28 MG/DL (ref 0.2–1)
BUN SERPL-MCNC: 28 MG/DL (ref 5–25)
CALCIUM ALBUM COR SERPL-MCNC: 10.5 MG/DL (ref 8.3–10.1)
CALCIUM SERPL-MCNC: 9.7 MG/DL (ref 8.4–10.2)
CHLORIDE SERPL-SCNC: 103 MMOL/L (ref 96–108)
CO2 SERPL-SCNC: 29 MMOL/L (ref 21–32)
CREAT SERPL-MCNC: 0.86 MG/DL (ref 0.6–1.3)
EOSINOPHIL # BLD MANUAL: 0.08 THOUSAND/UL (ref 0–0.4)
EOSINOPHIL NFR BLD MANUAL: 1 % (ref 0–6)
ERYTHROCYTE [DISTWIDTH] IN BLOOD BY AUTOMATED COUNT: 15.1 % (ref 11.6–15.1)
GFR SERPL CREATININE-BSD FRML MDRD: 68 ML/MIN/1.73SQ M
GLUCOSE SERPL-MCNC: 126 MG/DL (ref 65–140)
HCT VFR BLD AUTO: 26.1 % (ref 34.8–46.1)
HGB BLD-MCNC: 8 G/DL (ref 11.5–15.4)
LYMPHOCYTES # BLD AUTO: 0.76 THOUSAND/UL (ref 0.6–4.47)
LYMPHOCYTES # BLD AUTO: 10 % (ref 14–44)
MAGNESIUM SERPL-MCNC: 2.1 MG/DL (ref 1.9–2.7)
MCH RBC QN AUTO: 30.2 PG (ref 26.8–34.3)
MCHC RBC AUTO-ENTMCNC: 30.7 G/DL (ref 31.4–37.4)
MCV RBC AUTO: 99 FL (ref 82–98)
MONOCYTES # BLD AUTO: 0.23 THOUSAND/UL (ref 0–1.22)
MONOCYTES NFR BLD: 3 % (ref 4–12)
NEUTROPHILS # BLD MANUAL: 6.54 THOUSAND/UL (ref 1.85–7.62)
NEUTS BAND NFR BLD MANUAL: 6 % (ref 0–8)
NEUTS SEG NFR BLD AUTO: 80 % (ref 43–75)
PHOSPHATE SERPL-MCNC: 3.7 MG/DL (ref 2.3–4.1)
PLATELET # BLD AUTO: 562 THOUSANDS/UL (ref 149–390)
PLATELET BLD QL SMEAR: ABNORMAL
PMV BLD AUTO: 9.8 FL (ref 8.9–12.7)
POTASSIUM SERPL-SCNC: 5.1 MMOL/L (ref 3.5–5.3)
PROCALCITONIN SERPL-MCNC: 0.25 NG/ML
PROT SERPL-MCNC: 5.6 G/DL (ref 6.4–8.4)
RBC # BLD AUTO: 2.65 MILLION/UL (ref 3.81–5.12)
RBC MORPH BLD: PRESENT
SODIUM SERPL-SCNC: 137 MMOL/L (ref 135–147)
WBC # BLD AUTO: 7.61 THOUSAND/UL (ref 4.31–10.16)

## 2023-10-22 PROCEDURE — 94640 AIRWAY INHALATION TREATMENT: CPT

## 2023-10-22 PROCEDURE — 84145 PROCALCITONIN (PCT): CPT | Performed by: PHYSICIAN ASSISTANT

## 2023-10-22 PROCEDURE — 92611 MOTION FLUOROSCOPY/SWALLOW: CPT

## 2023-10-22 PROCEDURE — 94760 N-INVAS EAR/PLS OXIMETRY 1: CPT

## 2023-10-22 PROCEDURE — 80053 COMPREHEN METABOLIC PANEL: CPT | Performed by: PHYSICIAN ASSISTANT

## 2023-10-22 PROCEDURE — 74230 X-RAY XM SWLNG FUNCJ C+: CPT

## 2023-10-22 PROCEDURE — 84100 ASSAY OF PHOSPHORUS: CPT | Performed by: PHYSICIAN ASSISTANT

## 2023-10-22 PROCEDURE — 99232 SBSQ HOSP IP/OBS MODERATE 35: CPT | Performed by: INTERNAL MEDICINE

## 2023-10-22 PROCEDURE — 83735 ASSAY OF MAGNESIUM: CPT | Performed by: PHYSICIAN ASSISTANT

## 2023-10-22 PROCEDURE — 85027 COMPLETE CBC AUTOMATED: CPT | Performed by: PHYSICIAN ASSISTANT

## 2023-10-22 PROCEDURE — 85007 BL SMEAR W/DIFF WBC COUNT: CPT | Performed by: PHYSICIAN ASSISTANT

## 2023-10-22 RX ORDER — ALBUMIN, HUMAN INJ 5% 5 %
12.5 SOLUTION INTRAVENOUS ONCE
Status: COMPLETED | OUTPATIENT
Start: 2023-10-22 | End: 2023-10-23

## 2023-10-22 RX ADMIN — AMLODIPINE BESYLATE 10 MG: 5 TABLET ORAL at 10:21

## 2023-10-22 RX ADMIN — FLUCONAZOLE 200 MG: 100 TABLET ORAL at 10:22

## 2023-10-22 RX ADMIN — MELATONIN 3 MG: at 21:04

## 2023-10-22 RX ADMIN — ACYCLOVIR 400 MG: 200 CAPSULE ORAL at 10:22

## 2023-10-22 RX ADMIN — OXYCODONE HYDROCHLORIDE 5 MG: 5 SOLUTION ORAL at 12:41

## 2023-10-22 RX ADMIN — QUETIAPINE FUMARATE 50 MG: 25 TABLET ORAL at 21:05

## 2023-10-22 RX ADMIN — ONDANSETRON 4 MG: 2 INJECTION INTRAMUSCULAR; INTRAVENOUS at 09:02

## 2023-10-22 RX ADMIN — LEVALBUTEROL HYDROCHLORIDE 1.25 MG: 1.25 SOLUTION RESPIRATORY (INHALATION) at 07:29

## 2023-10-22 RX ADMIN — GUAIFENESIN 200 MG: 200 SOLUTION ORAL at 10:41

## 2023-10-22 RX ADMIN — Medication 8.8 MG: at 21:05

## 2023-10-22 RX ADMIN — BUSPIRONE HYDROCHLORIDE 30 MG: 10 TABLET ORAL at 21:05

## 2023-10-22 RX ADMIN — LEVALBUTEROL HYDROCHLORIDE 1.25 MG: 1.25 SOLUTION RESPIRATORY (INHALATION) at 19:58

## 2023-10-22 RX ADMIN — METOPROLOL TARTRATE 25 MG: 25 TABLET, FILM COATED ORAL at 21:05

## 2023-10-22 RX ADMIN — BUSPIRONE HYDROCHLORIDE 30 MG: 10 TABLET ORAL at 16:56

## 2023-10-22 RX ADMIN — SERTRALINE 75 MG: 25 TABLET, FILM COATED ORAL at 10:22

## 2023-10-22 RX ADMIN — IPRATROPIUM BROMIDE 0.5 MG: 0.5 SOLUTION RESPIRATORY (INHALATION) at 07:29

## 2023-10-22 RX ADMIN — FAMOTIDINE 20 MG: 20 TABLET, FILM COATED ORAL at 10:22

## 2023-10-22 RX ADMIN — GABAPENTIN 300 MG: 300 CAPSULE ORAL at 10:22

## 2023-10-22 RX ADMIN — GABAPENTIN 300 MG: 300 CAPSULE ORAL at 21:05

## 2023-10-22 RX ADMIN — NICOTINE 7 MG: 7 PATCH, EXTENDED RELEASE TRANSDERMAL at 10:38

## 2023-10-22 RX ADMIN — CHLORHEXIDINE GLUCONATE 15 ML: 1.2 SOLUTION ORAL at 21:05

## 2023-10-22 RX ADMIN — FAMOTIDINE 20 MG: 20 TABLET, FILM COATED ORAL at 17:00

## 2023-10-22 RX ADMIN — METOPROLOL TARTRATE 25 MG: 25 TABLET, FILM COATED ORAL at 10:22

## 2023-10-22 RX ADMIN — ACYCLOVIR 400 MG: 200 CAPSULE ORAL at 17:00

## 2023-10-22 RX ADMIN — OXYCODONE HYDROCHLORIDE 5 MG: 5 SOLUTION ORAL at 05:13

## 2023-10-22 RX ADMIN — LEVOFLOXACIN 250 MG: 250 TABLET, FILM COATED ORAL at 10:22

## 2023-10-22 RX ADMIN — BUSPIRONE HYDROCHLORIDE 30 MG: 10 TABLET ORAL at 10:22

## 2023-10-22 RX ADMIN — IPRATROPIUM BROMIDE 0.5 MG: 0.5 SOLUTION RESPIRATORY (INHALATION) at 19:58

## 2023-10-22 RX ADMIN — ALBUMIN (HUMAN) 12.5 G: 12.5 INJECTION, SOLUTION INTRAVENOUS at 23:14

## 2023-10-22 RX ADMIN — ENOXAPARIN SODIUM 40 MG: 40 INJECTION SUBCUTANEOUS at 14:31

## 2023-10-22 RX ADMIN — FILGRASTIM-AAFI 300 MCG: 300 INJECTION, SOLUTION SUBCUTANEOUS at 14:32

## 2023-10-22 RX ADMIN — GABAPENTIN 300 MG: 300 CAPSULE ORAL at 16:56

## 2023-10-22 RX ADMIN — OXYCODONE HYDROCHLORIDE 5 MG: 5 SOLUTION ORAL at 21:05

## 2023-10-22 NOTE — PROCEDURES
Video Swallow Study      Patient Name: Joseph MCGOVERN Date: 10/22/2023        Past Medical History  Past Medical History:   Diagnosis Date    Anxiety     Depression     Fatty liver     Hyperlipidemia     Hypertension     Psychiatric disorder     Varicella         Past Surgical History  Past Surgical History:   Procedure Laterality Date    BREAST SURGERY Bilateral     Reduction Procedure    CARDIAC SURGERY       SECTION      x4    DILATION AND CURETTAGE OF UTERUS      ECTOPIC PREGNANCY SURGERY      IR GASTROSTOMY TUBE PLACEMENT  2023    IR TUNNELED CENTRAL LINE PLACEMENT  10/19/2023     [address] INCL FLUOR GDNCE DX W/CELL WASHG SPX N/A 2023    Procedure: BRONCHOSCOPY FLEXIBLE;  Surgeon: Hayley Palacios MD;  Location: [facility];  Service: ENT    TRACHEOSTOMY N/A 2023    Procedure: TRACHEOSTOMY, biopsy of nasopharynx mass;  Surgeon: Hayley Palacios MD;  [facility];  Service: ENT     Modified (Video) Barium Swallow Study    Summary:  Images are on PACS for review. Pt presents w/ mod oropharyngeal dysphagia. Slowed lingual manipulation and transfer of boluses today. Delayed swallow initiation w/ bolus head reaching the pyriforms prior to reduced hyo-laryngeal elevation. Incomplete laryngeal vestibule closure results in recurrent penetration w/ thin and nectar thick liquids w/ subsequent epiglottic undercoating. Trace aspiration x1 w/ sequential sips of thin. Po aspiration x1 after the swallow w/ nectar thick, event was SILENT, though barium laden material noted at trach site. No penetration/aspiration of puree or honey thick liquids with or without PMV in place. Complete pharyngeal stripping wave, epiglottic inversion, and PES relaxation - no significant pharyngeal residue. Given pt's medical hx and findings today, consider initiating conservative diet in addition to tube feeds as primary nutrition w/ plan for ST f/u.      Immediately reviewed VBS findings w/ pt and RN. Education provided on identified oropharyngeal impairments, current aspiration risk, and potential initiation of conservative diet w/ plan for ST f/u to train strategies w/ advanced consistencies. Pt understanding and agreeable, denies questions/concerns at this time. Recommendations:  Diet: Puree   Liquids: Honey    Meds: Non-oral if possible   Strategies: Slow rate, small bites/sips   Frequent oral care  Upright position  F/u ST tx: Yes   Therapy Prognosis: Fair   Prognosis considerations: Age, current medical, motivation for tx   Full Supervision  Aspiration Precautions  Reflux Precautions  Consider consult with: -   Results reviewed with: pt, nursing, physician   Repeat MBS as necessary  If a dedicated assessment of the esophagus is desired, consider esophagram/barium swallow or EGD. Goals:  Pt will tolerate least restrictive diet w/out s/s aspiration or oral/pharyngeal difficulties. Patient's goal: "I want to eat something"        H&P/pertinent provider notes: (PMH noted above)  Brief history: 70-year-old female admitted to the ICU due to respiratory failure secondary to airway compromise from DLBCL now undergoing active chemo while on mechanical ventilation in the ICU. 24hrs: Did well for 4 hours on mechanical ventilation SIMV PC mode used from 8523-6473 then she was switched back to trach collar afterwards    Special Studies:  CXR 10/19: Persistent groundglass opacity, likely pulmonary edema, with trace effusions. CT soft tissue neck 10/13: Improving multi spatial mass centered in the left nasopharynx/oropharynx. Again the mass extends up to the skull base in the infratemporal fossa with loss of perineural fat at the left foramen ovale. Decreased mass effect on the airway. No adenopathy by size criteria. Bilateral effusions and extensive airspace disease is stable compared with yesterday's chest CT.     CT chest abdomen pelvis 10/12: Diffuse bilateral groundglass opacities and areas of more dense consolidation as described above which could represent pulmonary edema, pneumonia, or diffuse alveolar damage. Complex right upper pole renal lesion for which further evaluation is recommended with MRI on an outpatient basis. Mildly enlarged mediastinal lymph nodes which may be due to the patient's known lymphoma. Previous VBS:  No       Does the pt have pain? No   If yes, was nursing made aware/was it addressed? N/A     Swallow Mechanism Exam  Facial: symmetrical  Labial: WFL  Lingual:  did not assess today  Velum:  did not assess today   Mandible: adequate ROM  Dentition: edentulous  Vocal quality: clear but low volume w/ PMV in place    Volitional Cough: strong/productive   Respiratory Status: trach collar   Tracheostomy:  Siley 7 XLT, cuffed - deflated      Swallow Information   Current Risks for Dysphagia & Aspiration:  s/p trach, oropharyngeal mass  Current Symptoms/Concerns:  concern for silent aspiration   Current Diet: NPO with tube feeds   Baseline Diet: regular diet and thin liquids      Consistencies Administered:  Pt was viewed sitting upright in the lateral position. Due to patient declining ongoing trials, trials provided deviated from the [facility] Validated Protocol. Trials administered: With PMV - 5-mL thin liquid x2, 20-mL cup sip thin, 40-mL sequential swallow thin, 5-mL nectar thick, 20-mL cup sip nectar thick, 40-mL sequential swallow nectar thick, 5-mL Honey thick, 5-mL pudding and cup sip honey thick. Without PMV - tsp puree and tsp honey thick.       Oral Impairment:  Lip Closure: trace interlabial escape   Tongue Control During Bolus Hold: fair   Bolus Preparation/Mastication: pt declines hard solid trials   Bolus Transport/Lingual Motion: slowed   Oral Residue: trace lining the oral structures   Initiation of the Pharyngeal Swallow: mod delay w/ bolus head to the pyriforms     Pharyngeal Impairment:   Soft Palate Elevation: complete Laryngeal Elevation: reduced   Anterior Hyoid Excursion: reduced   Epiglottic Movement: complete   Laryngeal Vestibular Closure: incomplete   Pharyngeal Stripping Wave: complete   Pharyngeal Contraction: A-P view not obtained today   PES Opening: complete   Tongue Base Retraction: mild reduced   Pharyngeal Residue:  no significant residue     Screening of Esophageal Impairment   Esophageal Clearance: mild retention       Penetration/Aspiration:  Thin: penetration w/ tsp and single cup sips (PAS 3), trace aspiration w/ sequential sips (PAS 6)   Nectar: penetration during the swallow (PAS 3), SILENT aspiration after the swallow on residue from sequential swallows (PAS 8)   Honey: no penetration/aspiration (PAS 1)   Puree: no penetration/aspiration (PAS 1)   Solid: N/A   Response to Aspiration: throat clear w/ thin, SILENT w/ nectar though barium laden material noted at trach site   Strategies/Efficacy: chin tuck effective in eliminating penetration/aspiration of thin, effortful swallow effective in eliminating penetration/aspiration of thin     8-Point Penetration-Aspiration Scale   1 Material does not enter the airway   2 Material enters the airway, remains above the vocal folds, and is ejected  from the  airway    3 Material enters the airway, remains above the vocal folds, and is not ejected from the airway   4 Material enters the airway, contacts the vocal folds, and is ejected from the airway   5 Material enters the airway, contacts the vocal folds, and is not ejected from the airway    6 Material enters the airway, passes below the vocal folds and is ejected into the larynx or out of the airway    7 Material enters the airway, passes below the vocal folds, and is not ejected from the trachea despite effort    8 Material enters the airway, passes below the vocal folds, and no effort is made to eject

## 2023-10-22 NOTE — RESPIRATORY THERAPY NOTE
Pt was placed on DSTLD PC during sleep from 0913-0071 and placed back on trach collar at this time. Pt is awake and comfortable at this time.

## 2023-10-22 NOTE — PROGRESS NOTES
[facility]  Progress Note  Name: Katelynn Jarrett  MRN: [id number]  Unit/Bed#: ICU 03 I Date of Admission: 9/13/2023   Date of Service: 10/22/2023 I Hospital Day: 44    Assessment/Plan   Depression  Assessment & Plan  Patient on Zoloft 75 daily and Seroquel hs  Patient is depressed, multiple family/palliative discussions. patient is firm that she wants to live and to fight, she is just tired. Currently goal is disease treatment oriented. Full code. No SI/HI ideations. Palliative signed off, will reconsult if patient changes her mind regarding wishes. On 10/20/23 she decided to leave AMA. Now waiting for CM and machine     Generalized abdominal pain  Assessment & Plan  Patient reports abdominal pain which is unchanged since 9/22 no guarding on exam. Takes Famotidine for GERD at home. Alk phos 10/2 145, 10/20 79. CTCAP reveals complex right upper pole renal lesion requiring possible outpatient MRI    - Continue Famotidine  - On bowel regimen with Senna and Miralax prn    Chronic Superficial thrombophlebitis  Assessment & Plan  Right forearm soreness. RUE US: No acute or chronic deep vein thrombosis. 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 PO not severe enough to require renal dosing at this time, will continue to monitor and adjust dosing as needed.      -Continue DVT ppx with Lovenox 40    Large cell lymphoma ([address])  Assessment & Plan  Large B-cell lymphoma, stage II. First chemo cycle 9/22 4 days of R-EPOCH. 9/27 Rituxan. 9/28 Filgrastim. Acyclovir, Zyloprim, Diflucan, Levaquin, Zofran, Bactrim for chemo prophylaxis, all discontinued with heme-onc's approval as Nafisa reached 4200. I suspect leukocytosis is secondary to the filgrastim. Restarted all ppx 10/13 for [facility] cycle 2 which will run for the next 4 days. Ppx and filgrastim can be discontinued when patient is no longer neutropenic after this cycle again.  Platelets have been up trending to 696, which did not happen during cycle 1. This could simply be reactive from inflammation, but could also occur from vincristine which is part of current regimen in combination with the residual effect from filgastrim. Likely just reactive but if continues to rise consider checking blood smear, ferritin, ESR, CRP, Jak2, CALR, MPL, BCR-ABL1. Glucose is increasing on past 2 morning draws, appears to be 2/2 prednisolone from regimen. PICC came out with 3 hours chemo remaining. Tunneled line in R IJ placed yesterday and received cytoxan. Today benadryl with rituxan. Neutropenic precautions will be needed in a few days    Plan:  Keep Hgb>7 and Plt>20 for chemo  See acute respiratory failure above    Blister (nonthermal) of left forearm, sequela  Assessment & Plan  Blisters of left upper extremity healing, no signs of infection, continue daily wound care. Oropharyngeal mass  Assessment & Plan  9/14 Neck/ soft tissue CT: Large enhancing soft tissue mass identified within the left neck involving multiple spaces. Centered within the left oropharynx with extensions as described above into multiple spaces. This results in significant airway compromise. 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 Small level 3/level 4 nodes are seen within the neck. Tracheostomy by ENT. Replaced on 9/26. H/o tobacco abuse, daily smoker. On nicotine while inpatient, confirmed with patient she needs nicotine patch. CT soft tissue neck shows reduced mass effect from 9/14 to 10/13. Can consider reducing trach size if continues to improve    Plan:  ENT and heme onc following  Will titrate NRT weekly  See acute respiratory failure and large B cell lymphoma above      Angioedema  Assessment & Plan  [facility]: Swollen tongue, soft and hard palate with severe angioedema. Decadron 10 mg, Benadryl 25 mg given. 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     Plan:  Cuffless trach 9/17, cuffed Shiley XLT #7 10/3  See acute respiratory failure and oropharyngeal mass above  Continue to wean vent. Ambulatory dysfunction  Assessment & Plan  2/2 Impacted by chronic pain syndrome + prolonged hospital stay. Continue OOB with PT. PT rec post acute rehab however reportedly Cannot get LTACH placement while getting chemo per CM  She is aware STR SNFs continue to follow and treatment can be done from a SNF level of care. PT would need to be on trach collar for at least 48 hours with no need for the vent. Aware that pt would need to be around 28% FiO2     (HFpEF) heart failure with preserved ejection fraction [facility])  Assessment & Plan  Wt Readings from Last 3 Encounters:   10/21/23 78.5 kg (173 lb 1 oz)   09/07/23 74.6 kg (164 lb 6.4 oz)   08/30/23 73.3 kg (161 lb 9.6 oz)     Lab Results   Component Value Date    LVEF 60 08/28/2023     (H) 09/29/2023     (H) 09/13/2023     Echo 8/28/23: LVEF 60%. Plan:  K > 4   Measure I/O      CKD (chronic kidney disease) stage 3, GFR 30-59 ml/min [facility])  Assessment & Plan  Lab Results   Component Value Date    EGFR 68 10/22/2023    EGFR 58 10/21/2023    EGFR 64 10/20/2023    CREATININE 0.86 10/22/2023    CREATININE 0.98 10/21/2023    CREATININE 0.91 10/20/2023     Baseline Cr between 0.75-1.1  Initial PO felt 2/2 prerenal azotemia 2/2 diuresis, reduced PO intake +/- ATN. Patient received 2 doses of Bumex IV 2 g as she had not been responding appropriately to IV 40 Lasix daily. Creatinine went up by 0.5 meeting criteria for an PO. This may be prerenal intrinsic or postrenal.  Potential prerenal causes include reduced kidney perfusion due to lisinopril. Intrinsic can also be lisinopril due to ATN, AIN from chemo medications, TLS, crystaline nephropathy from acyclovir, rituxan nephrotoxicity, filgrastim glomerulonephritis. Post renal obstructive could be from urine retention from vincristine or cyclophosphamide bladder fibrosis.  Suspect most likely due to medications as a combination of prerenal and intrinsic. FENa was 0.2%, UA shows no casts or signs of infection, urine eosinophils 0%. Patient likely has prerenal HERIBERTO from volume depletion. Received 2 L NS and 1 pRBC and cr went up by 0.07 still and Na and Cl went down, suspect that volume prevented further cr rise and free water excretion impaired by kidney function, allowing hyponatremia to increase. Creatinine increase is likely plateaued and went down the following day. Suspect that now that nephrotoxic medications have been stopped she will continue to respond well to Lasix and creatinine will continue to downtrend. HERIBERTO now resolved. Will be cautious about nephrotoxins and monitor as restarting EPOCH and ppx. Patient gets volume depleted and overloaded very easily. Especially from chemo. 10/19 Heriberto as cr went up by 0.3 in 48 hours from 0.82 on 10/17 to 1.22 on 10/19. Suspect this is prerenal hypovolemic, will see if patient improves with fluids. She gets overloaded easily so if no improvement suspect CRS again. Received 2L bolus NS. Today cr down to 0.91, so was likely hypovolemic prerenal    Plan: Continue to monitor UO/renal function. Avoid nephrotoxic agents    Pain syndrome, chronic  Assessment & Plan  Home regimen: Oxycodone 5 mg BID, Tylenol 650 mg q8 prn. Gabapentin 600 mg TID. Medical marijuana. Lumbar radiculopathy and impaired ambulatory dysfunction secondary to pain. Has bowel regimen and is having bowel movements daily. Patient required additional pain management during previous cycle and has some additional pain from tunneled line placement    Plan:  Continue gabapentin 300 mg TID, oxycodone 5 mg TID, prn Tylenol 650 mg q6. Oxycodone 2.5 mg prn can d/c a day after tunneled line replaced    Benign essential hypertension  Assessment & Plan  Home regimen Coreg 12.5 mg BID, Amlodipine 10 mg daily, and lisinopril 40 mg.  All discontinued at this time secondary to hypotension and HERIBERTO since cycle 1. but stable currently without any antihypertensives. Systolic persistently elevated and tachycardic, potentially secondary to pain. Patient was more hypotensive during last chemo. Coreg contraindicated during chemo, since cycles are every other week, would be better to stick with lopressor during duration of therapy as opposed to switching constantly. Plan:  Monitor vitals. Continue Norvasc 10 and lopressor 25 bid  Prn hydralazine for SBP > 160    * Acute respiratory failure with hypoxia secondary to oropharyngeal mass  Assessment & Plan  Cuffless trach placed 9/17, transitioned to cuffed on 10/3. Oxygen requirements not improving. For mental health patient is on buspirone, quetiapine, and sertraline. For pain, gabapentin, oxycodone scheduled and prn, prn oxycodone mainly utilized for increased pain during chemo and after a bronch. On Guaifenesin, Atrovent and Xopenex for airway maintenance. No improvement in bilateral consolidation or atelectasis and groundglass opacities on repeat CT and chest x-rays. Patient not receiving proper positive pressure to open up atelectatic lung. Bronchial cx with 3 colonies CoNS. PCP PCR invalid, repeat negative. CTCAP indicates additional groundglass opacity with paraseptal emphysema, which may be contributing to inability to remain off vent. SBTs have been unsuccessful to place patient on trach collar since return to ICU. Patient tolerated high flow all day and overnight. Back on vent overnight, failed trach collar o/n. CXR 10/19 appears unchanged from 10/16 just not good breath during image, less of right lung visualized this time. Did well overnight on 6/8 consider CPAP vs overnight trach collar trial    Plan:  Maintain cuff overnight from 9 pm to 6 am for positive pressure, can open cuff during the day for patient to speak  6/8 tonight, can attempt CPAP next           Disposition: Stepdown Level 1    ICU Core Measures     A: Assess, Prevent, and Manage Pain Has pain been assessed?  Yes  Need for changes to pain regimen? No   B: Both SAT/SAT  N/A   C: Choice of Sedation RASS Goal: 0 Alert and Calm  Need for changes to sedation or analgesia regimen? No   D: Delirium CAM-ICU: Negative   E: Early Mobility  Plan for early mobility? Yes   F: Family Engagement Plan for family engagement today? Yes       Antibiotic Review: N/A    Review of Invasive Devices:      Central access plan:  N/A      Prophylaxis:  VTE VTE covered by:  enoxaparin, Subcutaneous, 40 mg at 10/21/23 0908       Stress Ulcer  covered bypantoprazole (PROTONIX) EC tablet 40 mg [[id number]]          Subjective   This morning patient was seen and examined at bedside. She was AAAOx3. She did not want to talk to me and was irritated. She is on high flow oxygen through trach collar. Denied shortness of breath, chest pain, abdominal pain, palpitation. Waiting for Monday! HPI/24hr events: No event overnight    Review of Systems   Constitutional:  Negative for fever. Respiratory:  Negative for shortness of breath. Cardiovascular:  Negative for chest pain, palpitations and leg swelling. Gastrointestinal:  Negative for nausea. Objective                            Vitals I/O      Most Recent Min/Max in 24hrs   Temp 98.7 °F (37.1 °C) Temp  Min: 98.7 °F (37.1 °C)  Max: 99.5 °F (37.5 °C)   Pulse 101 Pulse  Min: 80  Max: 118   Resp 20 Resp  Min: 17  Max: 25   /62 BP  Min: 101/59  Max: 140/67   O2 Sat 100 % SpO2  Min: 83 %  Max: 100 %      Intake/Output Summary (Last 24 hours) at 10/22/2023 0938  Last data filed at 10/22/2023 0601  Gross per 24 hour   Intake 1792 ml   Output 2340 ml   Net -548 ml         Diet Enteral/Parenteral; Tube Feeding No Oral Diet; Two Telly HN; Continuous; 38; 190; Water; Every 4 hours     Invasive Monitoring Physical exam    Physical Exam  Eyes:      Pupils: Pupils are equal, round, and reactive to light. Skin:     General: Skin is warm and dry. HENT:      Head: Normocephalic. Nose: No congestion.       Mouth/Throat: Mouth: Angioedema present. Comments: Dry mucus membranes. Shiley trache in place. No discharge/drainage  Cardiovascular:      Rate and Rhythm: Normal rate. Pulses: Normal pulses. Musculoskeletal:         General: Normal range of motion. Abdominal:      Palpations: Abdomen is soft. Constitutional:       General: She is not in acute distress. Appearance: She is well-nourished. Pulmonary:      Effort: Pulmonary effort is normal. No accessory muscle usage, respiratory distress or accessory muscle usage. Breath sounds: Normal breath sounds. Neurological:      General: No focal deficit present. Mental Status: She is alert and oriented to person, place and time. Motor: Strength full and intact in all extremities. Vitals and nursing note reviewed.             Diagnostic Studies      EKG: N/A  Imaging:  N/A     Medications:  Scheduled PRN   acyclovir, 400 mg, BID  amLODIPine, 10 mg, Daily  busPIRone, 30 mg, TID  chlorhexidine, 15 mL, Q12H ASHLEY  enoxaparin, 40 mg, Q24H ASHLEY  famotidine, 20 mg, BID  Filgrastim-aafi, 300 mcg, Daily  fluconazole, 200 mg, Daily  gabapentin, 300 mg, TID  levalbuterol, 1.25 mg, TID   And  ipratropium, 0.5 mg, TID  levofloxacin, 250 mg, Q24H ASHLEY  melatonin, 3 mg, HS  metoprolol tartrate, 25 mg, Q12H [address]  nicotine, 7 mg, Daily  oxyCODONE, 5 mg, Q8H  QUEtiapine, 50 mg, HS  senna, 8.8 mg, HS  sertraline, 75 mg, Daily  sulfamethoxazole-trimethoprim, 1 tablet, Once per day on Mon Wed Fri      acetaminophen, 650 mg, Q6H PRN  alteplase, 2 mg, Q1MIN PRN  alteplase, 2 mg, Q1MIN PRN  guaiFENesin, 200 mg, Q6H PRN  hydrALAZINE, 5 mg, Q6H PRN  ondansetron, 4 mg, Q8H PRN  ondansetron, 4 mg, Q8H PRN  oxyCODONE, 2.5 mg, Q6H PRN  polyethylene glycol, 17 g, Daily PRN  sodium chloride, 4 mL, Q6H PRN       Continuous          Labs:    CBC    Recent Labs     10/21/23  0506 10/22/23  0513   WBC 16.24* 7.61   HGB 7.9* 8.0*   HCT 25.1* 26.1*   * 562*   BANDSPCT  --  6 BMP    Recent Labs     10/21/23  0506 10/22/23  0513   SODIUM 135 137   K 4.7 5.1    103   CO2 27 29   AGAP 5 5   BUN 34* 28*   CREATININE 0.98 0.86   CALCIUM 9.1 9.7       Coags    No recent results     Additional Electrolytes  Recent Labs     10/21/23  0506 10/22/23  0513   MG 2.2 2.1   PHOS 3.0 3.7   CAIONIZED 1.26  --           Blood Gas    No recent results  No recent results LFTs  Recent Labs     10/21/23  0506 10/22/23  0513   ALT 17 16   AST 10* 10*   ALKPHOS 78 75   ALB 2.8* 3.0*   TBILI 0.32 0.28       Infectious  Recent Labs     10/22/23  0513   PROCALCITONI 0.25     Glucose  Recent Labs     10/21/23  0506 10/22/23  0513   GLUC 133 126                   Shima Johnson MD

## 2023-10-22 NOTE — ASSESSMENT & PLAN NOTE
Large B-cell lymphoma, stage II. First chemo cycle 9/22 4 days of R-EPOCH. 9/27 Rituxan. 9/28 Filgrastim. Acyclovir, Zyloprim, Diflucan, Levaquin, Zofran, Bactrim for chemo prophylaxis, all discontinued with heme-onc's approval as Nafisa reached 4200. I suspect leukocytosis is secondary to the filgrastim. Restarted all ppx 10/13 for [facility] cycle 2 which will run for the next 4 days. Ppx and filgrastim can be discontinued when patient is no longer neutropenic after this cycle again. Platelets have been up trending to 696, which did not happen during cycle 1. This could simply be reactive from inflammation, but could also occur from vincristine which is part of current regimen in combination with the residual effect from filgastrim. Likely just reactive but if continues to rise consider checking blood smear, ferritin, ESR, CRP, Jak2, CALR, MPL, BCR-ABL1. Glucose is increasing on past 2 morning draws, appears to be 2/2 prednisolone from regimen. PICC came out with 3 hours chemo remaining. Tunneled line in R IJ placed yesterday and received cytoxan. Today benadryl with rituxan.  Neutropenic precautions will be needed in a few days    Plan:  Keep Hgb>7 and Plt>20 for chemo  See acute respiratory failure above

## 2023-10-22 NOTE — ASSESSMENT & PLAN NOTE
Cuffless trach placed 9/17, transitioned to cuffed on 10/3. Oxygen requirements not improving. For mental health patient is on buspirone, quetiapine, and sertraline. For pain, gabapentin, oxycodone scheduled and prn, prn oxycodone mainly utilized for increased pain during chemo and after a bronch. On Guaifenesin, Atrovent and Xopenex for airway maintenance. No improvement in bilateral consolidation or atelectasis and groundglass opacities on repeat CT and chest x-rays. Patient not receiving proper positive pressure to open up atelectatic lung. Bronchial cx with 3 colonies CoNS. PCP PCR invalid, repeat negative. CTCAP indicates additional groundglass opacity with paraseptal emphysema, which may be contributing to inability to remain off vent. SBTs have been unsuccessful to place patient on trach collar since return to ICU. Patient tolerated high flow all day and overnight. Back on vent overnight, failed trach collar o/n. CXR 10/19 appears unchanged from 10/16 just not good breath during image, less of right lung visualized this time. Did well overnight on 6/8 consider CPAP vs overnight trach collar trial    Plan:  Maintain cuff overnight from 9 pm to 6 am for positive pressure, can open cuff during the day for patient to speak  On vent at night and on high flow oxygen during daytime via trach collar. Target is 350 TV, 12/6 pressure and PEEP 6.

## 2023-10-22 NOTE — ASSESSMENT & PLAN NOTE
POA in [place] ([place]) ED: Swollen tongue, soft and hard palate with severe angioedema. Decadron 10 mg, Benadryl 25 mg given. 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 Plan:  Cuffless trach 9/17, cuffed Shiley XLT #7 10/3  See acute respiratory failure and oropharyngeal mass above  Continue to wean vent.

## 2023-10-22 NOTE — ASSESSMENT & PLAN NOTE
Lab Results   Component Value Date    EGFR 68 10/22/2023    EGFR 58 10/21/2023    EGFR 64 10/20/2023    CREATININE 0.86 10/22/2023    CREATININE 0.98 10/21/2023    CREATININE 0.91 10/20/2023     Baseline Cr between 0.75-1.1  Initial HERIBERTO felt 2/2 prerenal azotemia 2/2 diuresis, reduced PO intake +/- ATN. Patient received 2 doses of Bumex IV 2 g as she had not been responding appropriately to IV 40 Lasix daily. Creatinine went up by 0.5 meeting criteria for an HERIBERTO. This may be prerenal intrinsic or postrenal.  Potential prerenal causes include reduced kidney perfusion due to lisinopril. Intrinsic can also be lisinopril due to ATN, AIN from chemo medications, TLS, crystaline nephropathy from acyclovir, rituxan nephrotoxicity, filgrastim glomerulonephritis. Post renal obstructive could be from urine retention from vincristine or cyclophosphamide bladder fibrosis. Suspect most likely due to medications as a combination of prerenal and intrinsic. FENa was 0.2%, UA shows no casts or signs of infection, urine eosinophils 0%. Patient likely has prerenal HERIBERTO from volume depletion. Received 2 L NS and 1 pRBC and cr went up by 0.07 still and Na and Cl went down, suspect that volume prevented further cr rise and free water excretion impaired by kidney function, allowing hyponatremia to increase. Creatinine increase is likely plateaued and went down the following day. Suspect that now that nephrotoxic medications have been stopped she will continue to respond well to Lasix and creatinine will continue to downtrend. HERIBERTO now resolved. Will be cautious about nephrotoxins and monitor as restarting EPOCH and ppx. Patient gets volume depleted and overloaded very easily. Especially from chemo. 10/19 Heriberto as cr went up by 0.3 in 48 hours from 0.82 on 10/17 to 1.22 on 10/19. Suspect this is prerenal hypovolemic, will see if patient improves with fluids. She gets overloaded easily so if no improvement suspect CRS again.  Received 2L bolus NS. Today cr down to 0.91, so was likely hypovolemic prerenal    Plan: Continue to monitor UO/renal function.  Avoid nephrotoxic agents

## 2023-10-22 NOTE — ASSESSMENT & PLAN NOTE
Wt Readings from Last 3 Encounters:   10/21/23 78.5 kg (173 lb 1 oz)   09/07/23 74.6 kg (164 lb 6.4 oz)   08/30/23 73.3 kg (161 lb 9.6 oz)     Lab Results   Component Value Date    LVEF 60 08/28/2023     (H) 09/29/2023     (H) 09/13/2023     Echo 8/28/23: LVEF 60%.        Plan:  K > 4   Measure I/O

## 2023-10-23 ENCOUNTER — TELEPHONE (OUTPATIENT)
Dept: HEMATOLOGY ONCOLOGY | Facility: CLINIC | Age: 68
End: 2023-10-23

## 2023-10-23 PROBLEM — R71.0 DECREASED HEMOGLOBIN: Status: ACTIVE | Noted: 2023-10-23

## 2023-10-23 LAB
ALBUMIN SERPL BCP-MCNC: 3 G/DL (ref 3.5–5)
ALP SERPL-CCNC: 67 U/L (ref 34–104)
ALT SERPL W P-5'-P-CCNC: 15 U/L (ref 7–52)
ANION GAP SERPL CALCULATED.3IONS-SCNC: 4 MMOL/L
ANISOCYTOSIS BLD QL SMEAR: PRESENT
AST SERPL W P-5'-P-CCNC: 11 U/L (ref 13–39)
BASOPHILS # BLD MANUAL: 0.04 THOUSAND/UL (ref 0–0.1)
BASOPHILS NFR MAR MANUAL: 1 % (ref 0–1)
BILIRUB DIRECT SERPL-MCNC: 0.06 MG/DL (ref 0–0.2)
BILIRUB SERPL-MCNC: 0.26 MG/DL (ref 0.2–1)
BLD SMEAR INTERP: NORMAL
BUN SERPL-MCNC: 30 MG/DL (ref 5–25)
CALCIUM ALBUM COR SERPL-MCNC: 10.2 MG/DL (ref 8.3–10.1)
CALCIUM SERPL-MCNC: 9.4 MG/DL (ref 8.4–10.2)
CHLORIDE SERPL-SCNC: 102 MMOL/L (ref 96–108)
CO2 SERPL-SCNC: 30 MMOL/L (ref 21–32)
CREAT SERPL-MCNC: 1.01 MG/DL (ref 0.6–1.3)
EOSINOPHIL # BLD MANUAL: 0.04 THOUSAND/UL (ref 0–0.4)
EOSINOPHIL NFR BLD MANUAL: 1 % (ref 0–6)
ERYTHROCYTE [DISTWIDTH] IN BLOOD BY AUTOMATED COUNT: 15 % (ref 11.6–15.1)
FERRITIN SERPL-MCNC: 519 NG/ML (ref 11–307)
FOLATE SERPL-MCNC: 5.5 NG/ML
GFR SERPL CREATININE-BSD FRML MDRD: 56 ML/MIN/1.73SQ M
GLUCOSE SERPL-MCNC: 124 MG/DL (ref 65–140)
HCT VFR BLD AUTO: 22.4 % (ref 34.8–46.1)
HCT VFR BLD AUTO: 23.2 % (ref 34.8–46.1)
HGB BLD-MCNC: 7 G/DL (ref 11.5–15.4)
HGB BLD-MCNC: 7.3 G/DL (ref 11.5–15.4)
HGB RETIC QN AUTO: 32 PG (ref 30–38.3)
IMM RETICS NFR: 3.1 % (ref 0–14)
IRON SATN MFR SERPL: 10 % (ref 15–50)
IRON SERPL-MCNC: 17 UG/DL (ref 50–212)
LDH SERPL-CCNC: 191 U/L (ref 140–271)
LYMPHOCYTES # BLD AUTO: 0.57 THOUSAND/UL (ref 0.6–4.47)
LYMPHOCYTES # BLD AUTO: 16 % (ref 14–44)
MAGNESIUM SERPL-MCNC: 2 MG/DL (ref 1.9–2.7)
MCH RBC QN AUTO: 30.3 PG (ref 26.8–34.3)
MCHC RBC AUTO-ENTMCNC: 31.3 G/DL (ref 31.4–37.4)
MCV RBC AUTO: 97 FL (ref 82–98)
METAMYELOCYTES NFR BLD MANUAL: 1 % (ref 0–1)
MONOCYTES # BLD AUTO: 0.04 THOUSAND/UL (ref 0–1.22)
MONOCYTES NFR BLD: 1 % (ref 4–12)
NEUTROPHILS # BLD MANUAL: 2.83 THOUSAND/UL (ref 1.85–7.62)
NEUTS BAND NFR BLD MANUAL: 4 % (ref 0–8)
NEUTS SEG NFR BLD AUTO: 76 % (ref 43–75)
PHOSPHATE SERPL-MCNC: 3.5 MG/DL (ref 2.3–4.1)
PLATELET # BLD AUTO: 413 THOUSANDS/UL (ref 149–390)
PLATELET BLD QL SMEAR: ABNORMAL
PMV BLD AUTO: 9.2 FL (ref 8.9–12.7)
POTASSIUM SERPL-SCNC: 5.1 MMOL/L (ref 3.5–5.3)
PROT SERPL-MCNC: 5.4 G/DL (ref 6.4–8.4)
RBC # BLD AUTO: 2.31 MILLION/UL (ref 3.81–5.12)
RBC MORPH BLD: PRESENT
RETICS # AUTO: ABNORMAL 10*3/UL (ref 14097–95744)
RETICS # AUTO: ABNORMAL 10*3/UL (ref 14097–95744)
RETICS # CALC: 0.21 % (ref 0.37–1.87)
RETICS # CALC: 0.29 % (ref 0.37–1.87)
SODIUM SERPL-SCNC: 136 MMOL/L (ref 135–147)
TIBC SERPL-MCNC: 165 UG/DL (ref 250–450)
UIBC SERPL-MCNC: 148 UG/DL (ref 155–355)
VIT B12 SERPL-MCNC: 761 PG/ML (ref 180–914)
WBC # BLD AUTO: 3.54 THOUSAND/UL (ref 4.31–10.16)

## 2023-10-23 PROCEDURE — 83735 ASSAY OF MAGNESIUM: CPT | Performed by: INTERNAL MEDICINE

## 2023-10-23 PROCEDURE — 85027 COMPLETE CBC AUTOMATED: CPT | Performed by: INTERNAL MEDICINE

## 2023-10-23 PROCEDURE — 85045 AUTOMATED RETICULOCYTE COUNT: CPT

## 2023-10-23 PROCEDURE — 82728 ASSAY OF FERRITIN: CPT

## 2023-10-23 PROCEDURE — 99291 CRITICAL CARE FIRST HOUR: CPT | Performed by: INTERNAL MEDICINE

## 2023-10-23 PROCEDURE — 94760 N-INVAS EAR/PLS OXIMETRY 1: CPT

## 2023-10-23 PROCEDURE — 94150 VITAL CAPACITY TEST: CPT

## 2023-10-23 PROCEDURE — 94640 AIRWAY INHALATION TREATMENT: CPT

## 2023-10-23 PROCEDURE — 80053 COMPREHEN METABOLIC PANEL: CPT | Performed by: INTERNAL MEDICINE

## 2023-10-23 PROCEDURE — 83540 ASSAY OF IRON: CPT

## 2023-10-23 PROCEDURE — 83010 ASSAY OF HAPTOGLOBIN QUANT: CPT

## 2023-10-23 PROCEDURE — 85007 BL SMEAR W/DIFF WBC COUNT: CPT | Performed by: INTERNAL MEDICINE

## 2023-10-23 PROCEDURE — 85018 HEMOGLOBIN: CPT

## 2023-10-23 PROCEDURE — 82607 VITAMIN B-12: CPT

## 2023-10-23 PROCEDURE — 85014 HEMATOCRIT: CPT

## 2023-10-23 PROCEDURE — 84100 ASSAY OF PHOSPHORUS: CPT | Performed by: INTERNAL MEDICINE

## 2023-10-23 PROCEDURE — 82248 BILIRUBIN DIRECT: CPT

## 2023-10-23 PROCEDURE — 83550 IRON BINDING TEST: CPT

## 2023-10-23 PROCEDURE — 94003 VENT MGMT INPAT SUBQ DAY: CPT

## 2023-10-23 PROCEDURE — 83615 LACTATE (LD) (LDH) ENZYME: CPT

## 2023-10-23 PROCEDURE — 85046 RETICYTE/HGB CONCENTRATE: CPT

## 2023-10-23 PROCEDURE — 82746 ASSAY OF FOLIC ACID SERUM: CPT

## 2023-10-23 RX ADMIN — QUETIAPINE FUMARATE 50 MG: 25 TABLET ORAL at 21:34

## 2023-10-23 RX ADMIN — METOPROLOL TARTRATE 25 MG: 25 TABLET, FILM COATED ORAL at 21:34

## 2023-10-23 RX ADMIN — CHLORHEXIDINE GLUCONATE 15 ML: 1.2 SOLUTION ORAL at 09:50

## 2023-10-23 RX ADMIN — BUSPIRONE HYDROCHLORIDE 30 MG: 10 TABLET ORAL at 21:34

## 2023-10-23 RX ADMIN — FILGRASTIM-AAFI 300 MCG: 300 INJECTION, SOLUTION SUBCUTANEOUS at 12:41

## 2023-10-23 RX ADMIN — SERTRALINE 75 MG: 25 TABLET, FILM COATED ORAL at 09:50

## 2023-10-23 RX ADMIN — FLUCONAZOLE 200 MG: 100 TABLET ORAL at 09:50

## 2023-10-23 RX ADMIN — BUSPIRONE HYDROCHLORIDE 30 MG: 10 TABLET ORAL at 17:51

## 2023-10-23 RX ADMIN — LEVALBUTEROL HYDROCHLORIDE 1.25 MG: 1.25 SOLUTION RESPIRATORY (INHALATION) at 13:42

## 2023-10-23 RX ADMIN — FAMOTIDINE 20 MG: 20 TABLET, FILM COATED ORAL at 09:50

## 2023-10-23 RX ADMIN — METOPROLOL TARTRATE 25 MG: 25 TABLET, FILM COATED ORAL at 09:50

## 2023-10-23 RX ADMIN — IPRATROPIUM BROMIDE 0.5 MG: 0.5 SOLUTION RESPIRATORY (INHALATION) at 21:16

## 2023-10-23 RX ADMIN — LEVALBUTEROL HYDROCHLORIDE 1.25 MG: 1.25 SOLUTION RESPIRATORY (INHALATION) at 21:16

## 2023-10-23 RX ADMIN — SULFAMETHOXAZOLE AND TRIMETHOPRIM 1 TABLET: 800; 160 TABLET ORAL at 09:49

## 2023-10-23 RX ADMIN — FAMOTIDINE 20 MG: 20 TABLET, FILM COATED ORAL at 17:52

## 2023-10-23 RX ADMIN — IPRATROPIUM BROMIDE 0.5 MG: 0.5 SOLUTION RESPIRATORY (INHALATION) at 13:42

## 2023-10-23 RX ADMIN — IPRATROPIUM BROMIDE 0.5 MG: 0.5 SOLUTION RESPIRATORY (INHALATION) at 08:26

## 2023-10-23 RX ADMIN — GABAPENTIN 300 MG: 300 CAPSULE ORAL at 21:34

## 2023-10-23 RX ADMIN — MELATONIN 3 MG: at 21:34

## 2023-10-23 RX ADMIN — LEVOFLOXACIN 250 MG: 250 TABLET, FILM COATED ORAL at 09:50

## 2023-10-23 RX ADMIN — NICOTINE 7 MG: 7 PATCH, EXTENDED RELEASE TRANSDERMAL at 10:00

## 2023-10-23 RX ADMIN — ENOXAPARIN SODIUM 40 MG: 40 INJECTION SUBCUTANEOUS at 09:50

## 2023-10-23 RX ADMIN — AMLODIPINE BESYLATE 10 MG: 5 TABLET ORAL at 09:49

## 2023-10-23 RX ADMIN — ACYCLOVIR 400 MG: 200 CAPSULE ORAL at 09:50

## 2023-10-23 RX ADMIN — ACYCLOVIR 400 MG: 200 CAPSULE ORAL at 17:52

## 2023-10-23 RX ADMIN — OXYCODONE HYDROCHLORIDE 5 MG: 5 SOLUTION ORAL at 04:00

## 2023-10-23 RX ADMIN — GABAPENTIN 300 MG: 300 CAPSULE ORAL at 17:52

## 2023-10-23 RX ADMIN — OXYCODONE HYDROCHLORIDE 5 MG: 5 SOLUTION ORAL at 21:33

## 2023-10-23 RX ADMIN — CHLORHEXIDINE GLUCONATE 15 ML: 1.2 SOLUTION ORAL at 21:34

## 2023-10-23 RX ADMIN — LEVALBUTEROL HYDROCHLORIDE 1.25 MG: 1.25 SOLUTION RESPIRATORY (INHALATION) at 08:25

## 2023-10-23 RX ADMIN — OXYCODONE HYDROCHLORIDE 5 MG: 5 SOLUTION ORAL at 12:43

## 2023-10-23 RX ADMIN — ONDANSETRON 4 MG: 2 INJECTION INTRAMUSCULAR; INTRAVENOUS at 18:20

## 2023-10-23 RX ADMIN — BUSPIRONE HYDROCHLORIDE 30 MG: 10 TABLET ORAL at 09:50

## 2023-10-23 RX ADMIN — GABAPENTIN 300 MG: 300 CAPSULE ORAL at 09:50

## 2023-10-23 NOTE — ASSESSMENT & PLAN NOTE
Home regimen: Oxycodone 5 mg twice daily as needed. Tylenol 650 mg every 8 hours as needed. Gabapentin 600 mg 3 times daily. Medical marijuana. Per daughter, patient was overusing Tylenol over-the-counter. Pain mostly from lumbar radiculopathy. Impaired ambulatory dysfunction second to pain. Continue gabapentin 300mg 3 times daily, scheduled oxycodone 5 mg 3 times daily, as needed Tylenol 650 mg every 6 hours.  There is additional oxycodone to take should she have breakthrough pain

## 2023-10-23 NOTE — ASSESSMENT & PLAN NOTE
Lab Results   Component Value Date    CHOLESTEROL 118 10/09/2023    CHOLESTEROL 203 (H) 01/24/2023    CHOLESTEROL 177 08/11/2022     Lab Results   Component Value Date    HDL 14 (L) 10/09/2023    HDL 45 (L) 01/24/2023    HDL 37 (L) 08/11/2022     Lab Results   Component Value Date    TRIG 144 10/09/2023    TRIG 166 (H) 09/16/2023    TRIG 160 (H) 01/24/2023     Lab Results   Component Value Date    NONHDLC 104 10/09/2023    [address] 07/12/2018    [address] (H) 04/29/2013     Continue outpatient diet and lifestyle modification.

## 2023-10-23 NOTE — ASSESSMENT & PLAN NOTE
Lab Results   Component Value Date    CREATININE 1.01 10/23/2023    CREATININE 0.86 10/22/2023    CREATININE 0.98 10/21/2023       Likely prerenal.  Second to poor oral intake versus poor urine output. Baseline creatinine 1 to 1.2.     Cr at baseline  Plan:  Urinary retention protocol, Daily weights, I/O  Trend Cr daily

## 2023-10-23 NOTE — ASSESSMENT & PLAN NOTE
POA in [place] ([place]) ED: Swollen tongue, soft and hard palate with severe angioedema. Decadron 10 mg, Benadryl 25 mg given. 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 Plan:  Cuffless trach 9/17, cuffed Shiley XLT #7 10/3  See acute respiratory failure and oropharyngeal mass above  Continue to wean vent.

## 2023-10-23 NOTE — ASSESSMENT & PLAN NOTE
Hemoglobin downtrended from 8 to 7.0. Patient received 1 PRBC transfusion on 10/5. No sites of bleeding, no black stools. Plan:  Trend hemoglobin and transfuse for <7. F/u Iron panel. F/u Hemolysis panel.

## 2023-10-23 NOTE — ASSESSMENT & PLAN NOTE
9/17/23: Blisters of LUE noted, no signs of infection  Daily wound care, monitor signs of infection- healing noted

## 2023-10-23 NOTE — PLAN OF CARE
Problem: MOBILITY - ADULT  Goal: Maintain or return to baseline ADL function  Description: INTERVENTIONS:  -  Assess patient's ability to carry out ADLs; assess patient's baseline for ADL function and identify physical deficits which impact ability to perform ADLs (bathing, care of mouth/teeth, toileting, grooming, dressing, etc.)  - Assess/evaluate cause of self-care deficits   - Assess range of motion  - Assess patient's mobility; develop plan if impaired  - Assess patient's need for assistive devices and provide as appropriate  - Encourage maximum independence but intervene and supervise when necessary  - Involve family in performance of ADLs  - Assess for home care needs following discharge   - Consider OT consult to assist with ADL evaluation and planning for discharge  - Provide patient education as appropriate  10/23/2023 1531 by Ginny Ormond, RN  Outcome: Progressing  10/23/2023 1531 by Ginny Ormond, RN  Outcome: Progressing  Goal: Maintains/Returns to pre admission functional level  Description: INTERVENTIONS:  - Perform BMAT or MOVE assessment daily.   - Set and communicate daily mobility goal to care team and patient/family/caregiver.    - Out of bed for toileting  - Record patient progress and toleration of activity level   10/23/2023 1531 by Ginny Ormond, RN  Outcome: Progressing  10/23/2023 1531 by Ginny Ormond, RN  Outcome: Progressing     Problem: Prexisting or High Potential for Compromised Skin Integrity  Goal: Skin integrity is maintained or improved  Description: INTERVENTIONS:  - Identify patients at risk for skin breakdown  - Assess and monitor skin integrity  - Assess and monitor nutrition and hydration status  - Monitor labs   - Assess for incontinence   - Turn and reposition patient  - Assist with mobility/ambulation  - Relieve pressure over bony prominences  - Avoid friction and shearing  - Provide appropriate hygiene as needed including keeping skin clean and dry  - Evaluate need for skin moisturizer/barrier cream  - Collaborate with interdisciplinary team   - Patient/family teaching  - Consider wound care consult   10/23/2023 1531 by Vicki Gerber RN  Outcome: Progressing  10/23/2023 1531 by Vicki Gerber RN  Outcome: Progressing     Problem: Nutrition/Hydration-ADULT  Goal: Nutrient/Hydration intake appropriate for improving, restoring or maintaining nutritional needs  Description: Monitor and assess patient's nutrition/hydration status for malnutrition. Collaborate with interdisciplinary team and initiate plan and interventions as ordered. Monitor patient's weight and dietary intake as ordered or per policy. Utilize nutrition screening tool and intervene as necessary. Determine patient's food preferences and provide high-protein, high-caloric foods as appropriate.      INTERVENTIONS:  - Monitor oral intake, urinary output, labs, and treatment plans  - Assess nutrition and hydration status and recommend course of action  - Evaluate amount of meals eaten  - Assist patient with eating if necessary   - Allow adequate time for meals  - Recommend/ encourage appropriate diets, oral nutritional supplements, and vitamin/mineral supplements  - Order, calculate, and assess calorie counts as needed  - Recommend, monitor, and adjust tube feedings and TPN/PPN based on assessed needs  - Assess need for intravenous fluids  - Provide specific nutrition/hydration education as appropriate  - Include patient/family/caregiver in decisions related to nutrition  10/23/2023 1531 by Vicki Gerber RN  Outcome: Progressing  10/23/2023 1531 by Vicki Gerber RN  Outcome: Progressing

## 2023-10-23 NOTE — TELEPHONE ENCOUNTER
Appointment Schedule   Who are you speaking with? In-basket message   If it is not the patient, are they listed on an active communication consent form? Dr. Cuong Carreon provider is the appointment scheduled with? Dr. Alo Miles   At which location is the appointment scheduled for? [place]   When is the appointment scheduled? Please list date and time 10/30/2023 840 am   What is the reason for this appointment? R/s missed hops f/u Patient is currently inpatient. In-basket message: Patient needs follow up for her newly diagnosed lymphoma and need to start cycle 3 of chemotherapy on Nov 10 with dose adjusted R-EPOCH. Did patient voice understanding of the details of this appointment? N/A   Was the no show policy reviewed with patient?  N/A

## 2023-10-23 NOTE — ASSESSMENT & PLAN NOTE
Patient on Zoloft 75 daily and Seroquel hs  Patient is depressed, multiple family/palliative discussions. patient is firm that she wants to live and to fight, she is just tired. Currently goal is disease treatment oriented. Full code. No SI/HI ideations. Palliative signed off, will reconsult if patient changes her mind regarding wishes. On 10/20/23 she decided to leave A.  Now waiting for CM and machine

## 2023-10-23 NOTE — ASSESSMENT & PLAN NOTE
Pain started after PEG placement   CT scan of the A/P done on 10/13- results reviewed.  No obvious pathology to explain pain in the left side of her abdomen but as of today (10/25) the pain is no longer present

## 2023-10-23 NOTE — ASSESSMENT & PLAN NOTE
POA with acute respiratory failure, SOB. On physical in [place] ED: Swollen tongue, swelling soft and hard palate and almost the head of all phalanx is completely closed. Severe angioedema. Decadron 10 mg, Benadryl 25 mg given. Initially refused but eventually agreed with intubation. Prior episode of angioedema in 2020 noted. Suspected coadministration of increase the dose of tramadol and lisinopril. Per daughter, there is no recent change in medications except Trelegy inhaler, cefdinir.   Etiology: more likely 2/2 oropharyngeal mass compression.          Plan:  Trach placed 9/17  Tolerating trach, No issue  PEG tube in place

## 2023-10-23 NOTE — ASSESSMENT & PLAN NOTE
Well controlled at home. Home meds: Amlodipine 10 mg daily, Coreg 25 mg twice daily, lisinopril 10 mg daily. Elevated BP may second to anxiety from chemotherapy/new diagnosis lymphoma, chronic pain. Inpatient BP regimen includes:  Metoprolol 25 mg Q12  Amlodipine 10 mg daily. PRN hydralazine if BP >160.   BP is acceptable today (10/25)

## 2023-10-23 NOTE — ASSESSMENT & PLAN NOTE
Biopsy on 9/17: Large B-cell lymphoma, germinal center B-cell type, c-MYC and BCL-2 double expression. Ki-67 proliferation index is up to 90%; FISH & NGS pending. Staging work-up: Currently stage II. First inpatient chemotherapy 9/22/2023- with R-EPOCH. Patient started on filgrastim  WBC levels are at 3.66 today  On prophyalxis with diflucan, acycolvir, and levaquin     Inpatient hematology oncology following. Recommendation appreciated. Outpatient follow-up with hematology oncology.   Close TLS workup- uric acid, electrolytes  Allopurinol as prophylaxis  Neupogen  PEG tube in place

## 2023-10-23 NOTE — ASSESSMENT & PLAN NOTE
Wt Readings from Last 3 Encounters:   10/23/23 76.9 kg (169 lb 8.5 oz)   09/07/23 74.6 kg (164 lb 6.4 oz)   08/30/23 73.3 kg (161 lb 9.6 oz)     Lab Results   Component Value Date    LVEF 60 08/28/2023     (H) 09/29/2023     (H) 09/13/2023     Echo 8/28/23: LVEF 60%.        Plan:  K > 4   Measure I/O

## 2023-10-23 NOTE — PROGRESS NOTES
[address] [facility]  Progress Note  Name: Barry Joe  MRN: [id number]  Unit/Bed#: ICU 03 I Date of Admission: 9/13/2023   Date of Service: 10/23/2023 I Hospital Day: 40    Assessment/Plan   * Acute respiratory failure with hypoxia secondary to oropharyngeal mass  Assessment & Plan  Cuffless trach placed 9/17, transitioned to cuffed on 10/3. Oxygen requirements not improving. For mental health patient is on buspirone, quetiapine, and sertraline. For pain, gabapentin, oxycodone scheduled and prn, prn oxycodone mainly utilized for increased pain during chemo and after a bronch. On Guaifenesin, Atrovent and Xopenex for airway maintenance. No improvement in bilateral consolidation or atelectasis and groundglass opacities on repeat CT and chest x-rays. Patient not receiving proper positive pressure to open up atelectatic lung. Bronchial cx with 3 colonies CoNS. PCP PCR invalid, repeat negative. CTCAP indicates additional groundglass opacity with paraseptal emphysema, which may be contributing to inability to remain off vent. SBTs have been unsuccessful to place patient on trach collar since return to ICU. Patient tolerated high flow all day and overnight. Back on vent overnight, failed trach collar o/n. CXR 10/19 appears unchanged from 10/16 just not good breath during image, less of right lung visualized this time. Did well overnight on 6/8 consider CPAP vs overnight trach collar trial    Plan:  Maintain cuff overnight from 9 pm to 6 am for positive pressure, can open cuff during the day for patient to speak  On vent at night and on high flow oxygen during daytime via trach collar. Target is 350 TV, 12/6 pressure and PEEP 6.    Large cell lymphoma (HCC)  Assessment & Plan  Large B-cell lymphoma, stage II. First chemo cycle 9/22 4 days of R-EPOCH. 9/27 Rituxan. 9/28 Filgrastim.  Acyclovir, Zyloprim, Diflucan, Levaquin, Zofran, Bactrim for chemo prophylaxis, all discontinued with heme-onc's approval as Nafisa reached 4200. I suspect leukocytosis is secondary to the filgrastim. Restarted all ppx 10/13 for [facility] cycle 2 which will run for the next 4 days. Ppx and filgrastim can be discontinued when patient is no longer neutropenic after this cycle again. Platelets have been up trending to 696, which did not happen during cycle 1. This could simply be reactive from inflammation, but could also occur from vincristine which is part of current regimen in combination with the residual effect from filgastrim. Likely just reactive but if continues to rise consider checking blood smear, ferritin, ESR, CRP, Jak2, CALR, MPL, BCR-ABL1. Glucose is increasing on past 2 morning draws, appears to be 2/2 prednisolone from regimen. PICC came out with 3 hours chemo remaining. Tunneled line in R IJ placed yesterday and received cytoxan. Today benadryl with rituxan. Neutropenic precautions will be needed in a few days    Plan:  Keep Hgb>7 and Plt>20 for chemo  See acute respiratory failure above    Oropharyngeal mass  Assessment & Plan  9/14 Neck/ soft tissue CT: Large enhancing soft tissue mass identified within the left neck involving multiple spaces. Centered within the left oropharynx with extensions as described above into multiple spaces. This results in significant airway compromise. 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 Small level 3/level 4 nodes are seen within the neck. Tracheostomy by ENT. Replaced on 9/26. H/o tobacco abuse, daily smoker. On nicotine while inpatient, confirmed with patient she needs nicotine patch. CT soft tissue neck shows reduced mass effect from 9/14 to 10/13. Can consider reducing trach size if continues to improve    Plan:  ENT and heme onc following  Will titrate NRT weekly  See acute respiratory failure and large B cell lymphoma above      Decreased hemoglobin  Assessment & Plan  Hemoglobin downtrended from 8 to 7.0. Patient received 1 PRBC transfusion on 10/5.    No sites of bleeding, no black stools. Plan:  Trend hemoglobin and transfuse for <7. F/u Iron panel. F/u Hemolysis panel. RML pneumonia-resolved as of 9/22/2023  Assessment & Plan  9/13 CT PE study: Patchy consolidation right middle lobe, new from 8/25/2023, compatible with pneumonia. No leukocytosis, no fever/chills. Positive for sore throat, cough. New onset pneumonia after recent hospitalization and antibiotics treatment. 9/14: Bronchoscopy/ ABL. MRSA negative. ABL revealed 2+ Growth of Candida albicans, suspected contaminant. PO (acute kidney injury) (HCC)-resolved as of 9/21/2023  Assessment & Plan  Lab Results   Component Value Date    CREATININE 1.01 10/23/2023    CREATININE 0.86 10/22/2023    CREATININE 0.98 10/21/2023       Likely prerenal.  Second to poor oral intake versus poor urine output. Baseline creatinine 1 to 1.2. Cr at baseline  Plan:  Urinary retention protocol, Daily weights, I/O  Trend Cr daily    (HFpEF) heart failure with preserved ejection fraction [facility])  Assessment & Plan  Wt Readings from Last 3 Encounters:   10/23/23 76.9 kg (169 lb 8.5 oz)   09/07/23 74.6 kg (164 lb 6.4 oz)   08/30/23 73.3 kg (161 lb 9.6 oz)     Lab Results   Component Value Date    LVEF 60 08/28/2023     (H) 09/29/2023     (H) 09/13/2023     Echo 8/28/23: LVEF 60%. Plan:  K > 4   Measure I/O      Angioedema  Assessment & Plan  POA in [place] ([place] ED: Swollen tongue, soft and hard palate with severe angioedema. Decadron 10 mg, Benadryl 25 mg given. 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 Plan:  Cuffless trach 9/17, cuffed Shiley XLT #7 10/3  See acute respiratory failure and oropharyngeal mass above  Continue to wean vent. Ambulatory dysfunction  Assessment & Plan  2/2 Impacted by chronic pain syndrome + prolonged hospital stay. Continue OOB with PT.    PT rec post acute rehab however reportedly Cannot get LTACH placement while getting chemo per CM  She is aware STR SNFs continue to follow and treatment can be done from a SNF level of care. PT would need to be on trach collar for at least 48 hours with no need for the vent. Aware that pt would need to be around 28% FiO2     Pain syndrome, chronic  Assessment & Plan  Home regimen: Oxycodone 5 mg BID, Tylenol 650 mg q8 prn. Gabapentin 600 mg TID. Medical marijuana. Lumbar radiculopathy and impaired ambulatory dysfunction secondary to pain. Has bowel regimen and is having bowel movements daily. Patient required additional pain management during previous cycle and has some additional pain from tunneled line placement    Plan:  Continue gabapentin 300 mg TID, oxycodone 5 mg TID, prn Tylenol 650 mg q6. Oxycodone 2.5 mg prn can d/c a day after tunneled line replaced    Benign essential hypertension  Assessment & Plan  Home regimen Coreg 12.5 mg BID, Amlodipine 10 mg daily, and lisinopril 40 mg. All discontinued at this time secondary to hypotension and PO since cycle 1. but stable currently without any antihypertensives. Systolic persistently elevated and tachycardic, potentially secondary to pain. Patient was more hypotensive during last chemo. Coreg contraindicated during chemo, since cycles are every other week, would be better to stick with lopressor during duration of therapy as opposed to switching constantly. Plan:  Monitor vitals.   Continue Norvasc 10 and lopressor 25 bid  Prn hydralazine for SBP > 160    Hyperlipidemia-resolved as of 10/2/2023  Assessment & Plan  Lab Results   Component Value Date    CHOLESTEROL 118 10/09/2023    CHOLESTEROL 203 (H) 01/24/2023    CHOLESTEROL 177 08/11/2022     Lab Results   Component Value Date    HDL 14 (L) 10/09/2023    HDL 45 (L) 01/24/2023    HDL 37 (L) 08/11/2022     Lab Results   Component Value Date    TRIG 144 10/09/2023    TRIG 166 (H) 09/16/2023    TRIG 160 (H) 01/24/2023     Lab Results   Component Value Date    NONHDLC 104 10/09/2023    [address] 146 07/12/2018    [address] 207 (H) 04/29/2013     Continue outpatient diet and lifestyle modification. Depression  Assessment & Plan  Patient on Zoloft 75 daily and Seroquel hs  Patient is depressed, multiple family/palliative discussions. patient is firm that she wants to live and to fight, she is just tired. Currently goal is disease treatment oriented. Full code. No SI/HI ideations. Palliative signed off, will reconsult if patient changes her mind regarding wishes. On 10/20/23 she decided to leave AMA. Now waiting for CM and machine     Generalized abdominal pain  Assessment & Plan  Patient reports abdominal pain which is unchanged since 9/22 no guarding on exam. Takes Famotidine for GERD at home. Alk phos 10/2 145, 10/20 79. CTCAP reveals complex right upper pole renal lesion requiring possible outpatient MRI    - Continue Famotidine  - On bowel regimen with Senna and Miralax prn    Chronic Superficial thrombophlebitis  Assessment & Plan  Right forearm soreness. RUE US: No acute or chronic deep vein thrombosis. 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 PO not severe enough to require renal dosing at this time, will continue to monitor and adjust dosing as needed.      -Continue DVT ppx with Lovenox 40    Blister (nonthermal) of left forearm, sequela  Assessment & Plan  Blisters of left upper extremity healing, no signs of infection, continue daily wound care. CKD (chronic kidney disease) stage 3, GFR 30-59 ml/min [facility])  Assessment & Plan  Lab Results   Component Value Date    EGFR 56 10/23/2023    EGFR 68 10/22/2023    EGFR 58 10/21/2023    CREATININE 1.01 10/23/2023    CREATININE 0.86 10/22/2023    CREATININE 0.98 10/21/2023     Baseline Cr between 0.75-1.1  Initial PO felt 2/2 prerenal azotemia 2/2 diuresis, reduced PO intake +/- ATN. Patient received 2 doses of Bumex IV 2 g as she had not been responding appropriately to IV 40 Lasix daily. Creatinine went up by 0.5 meeting criteria for an PO.   This may be prerenal intrinsic or postrenal.  Potential prerenal causes include reduced kidney perfusion due to lisinopril. Intrinsic can also be lisinopril due to ATN, AIN from chemo medications, TLS, crystaline nephropathy from acyclovir, rituxan nephrotoxicity, filgrastim glomerulonephritis. Post renal obstructive could be from urine retention from vincristine or cyclophosphamide bladder fibrosis. Suspect most likely due to medications as a combination of prerenal and intrinsic. FENa was 0.2%, UA shows no casts or signs of infection, urine eosinophils 0%. Patient likely has prerenal HERIBERTO from volume depletion. Received 2 L NS and 1 pRBC and cr went up by 0.07 still and Na and Cl went down, suspect that volume prevented further cr rise and free water excretion impaired by kidney function, allowing hyponatremia to increase. Creatinine increase is likely plateaued and went down the following day. Suspect that now that nephrotoxic medications have been stopped she will continue to respond well to Lasix and creatinine will continue to downtrend. HERIBERTO now resolved. Will be cautious about nephrotoxins and monitor as restarting EPOCH and ppx. Patient gets volume depleted and overloaded very easily. Especially from chemo. 10/19 Heriberto as cr went up by 0.3 in 48 hours from 0.82 on 10/17 to 1.22 on 10/19. Suspect this is prerenal hypovolemic, will see if patient improves with fluids. She gets overloaded easily so if no improvement suspect CRS again. Received 2L bolus NS. Today cr down to 0.91, so was likely hypovolemic prerenal    Plan: Continue to monitor UO/renal function. Avoid nephrotoxic agents    COPD without exacerbation (HCC)-resolved as of 9/26/2023  Assessment & Plan  Continue vent with nebs. Wean off vent. Encephalopathy-resolved as of 9/21/2023  Assessment & Plan  9/19- Pt appeared to be AAO x3. Improving.              ICU Core Measures     Vented Patient  VAP Bundle  VAP bundle ordered     A: Assess, Prevent, and Manage Pain Has pain been assessed? Yes  Need for changes to pain regimen? No   B: Both Spontaneous Awakening Trials (SATs) and Spontaneous Breathing Trials (SBTs) Plan to perform spontaneous awakening trial today? N/A      C: Choice of Sedation RASS Goal: 0 Alert and Calm  Need for changes to sedation or analgesia regimen? No   D: Delirium CAM-ICU: Negative   E: Early Mobility  Plan for early mobility? Yes   F: Family Engagement Plan for family engagement today? Yes       Antibiotic Review: N/A    Review of Invasive Devices:      Central access plan:  Central line in place      Prophylaxis:  VTE VTE covered by:  enoxaparin, Subcutaneous, 40 mg at 10/23/23 0950       Stress Ulcer  covered byfamotidine (PEPCID) tablet 20 mg [[id number]]       Subjective   HPI/24hr events: Rapid heart rate    Review of Systems   Psychiatric/Behavioral:  Positive for agitation. was deferred as patient was non-communicative due to tracheostomy. No complaints by the patient. Objective                            Vitals I/O      Most Recent Min/Max in 24hrs   Temp 99.8 °F (37.7 °C) Temp  Min: 98.1 °F (36.7 °C)  Max: 99.8 °F (37.7 °C)   Pulse 97 Pulse  Min: 84  Max: 122   Resp 22 Resp  Min: 17  Max: 35   /64 BP  Min: 80/51  Max: 142/87   O2 Sat 92 % SpO2  Min: 88 %  Max: 100 %      Intake/Output Summary (Last 24 hours) at 10/23/2023 1155  Last data filed at 10/23/2023 0800  Gross per 24 hour   Intake 1212 ml   Output 600 ml   Net 612 ml         Diet Enteral/Parenteral; Tube Feeding with Oral Diet; Two Telly HN; Continuous; 38; 190; Water; Every 4 hours; Dysphagia/Modified Consistency; Dysphagia 1-Pureed; Honey Thick Liquid; Dysphagia 1-Pureed, Honey Thick Liquid     Invasive Monitoring Physical exam    Physical Exam  Eyes:      Extraocular Movements: Extraocular movements intact. Pupils: Pupils are equal, round, and reactive to light. HENT:      Head: Normocephalic.    Cardiovascular:      Rate and Rhythm: Regular rhythm. Tachycardia present. Musculoskeletal:         General: No deformity. Constitutional:       Appearance: She is well-developed. Psychiatric:         Mood and Affect:  mood and affect abnormal.  Neurological:      General: No focal deficit present. Mental Status: She is alert. She is agitated. Motor: gross motor function is at baseline for patient.               Diagnostic Studies      EKG: N/A  Imaging: N/A      Medications:  Scheduled PRN   acyclovir, 400 mg, BID  amLODIPine, 10 mg, Daily  busPIRone, 30 mg, TID  chlorhexidine, 15 mL, Q12H ASHLEY  enoxaparin, 40 mg, Q24H ASHLEY  famotidine, 20 mg, BID  Filgrastim-aafi, 300 mcg, Daily  fluconazole, 200 mg, Daily  gabapentin, 300 mg, TID  levalbuterol, 1.25 mg, TID   And  ipratropium, 0.5 mg, TID  levofloxacin, 250 mg, Q24H ASHLEY  melatonin, 3 mg, HS  metoprolol tartrate, 25 mg, Q12H [address]  nicotine, 7 mg, Daily  oxyCODONE, 5 mg, Q8H  QUEtiapine, 50 mg, HS  senna, 8.8 mg, HS  sertraline, 75 mg, Daily  sulfamethoxazole-trimethoprim, 1 tablet, Once per day on Mon Wed Fri      acetaminophen, 650 mg, Q6H PRN  alteplase, 2 mg, Q1MIN PRN  alteplase, 2 mg, Q1MIN PRN  guaiFENesin, 200 mg, Q6H PRN  hydrALAZINE, 5 mg, Q6H PRN  ondansetron, 4 mg, Q8H PRN  ondansetron, 4 mg, Q8H PRN  oxyCODONE, 2.5 mg, Q6H PRN  polyethylene glycol, 17 g, Daily PRN  sodium chloride, 4 mL, Q6H PRN       Continuous          Labs:    CBC    Recent Labs     10/22/23  0513 10/23/23  0403   WBC 7.61 3.54*   HGB 8.0* 7.0*   HCT 26.1* 22.4*   * 413*   BANDSPCT 6 4     BMP    Recent Labs     10/22/23  0513 10/23/23  0403   SODIUM 137 136   K 5.1 5.1    102   CO2 29 30   AGAP 5 4   BUN 28* 30*   CREATININE 0.86 1.01   CALCIUM 9.7 9.4       Coags    No recent results     Additional Electrolytes  Recent Labs     10/22/23  0513 10/23/23  0403   MG 2.1 2.0   PHOS 3.7 3.5          Blood Gas    No recent results  No recent results LFTs  Recent Labs     10/22/23  0513 10/23/23  0403   ALT 16 15   AST 10* 11*   ALKPHOS 75 67   ALB 3.0* 3.0*   TBILI 0.28 0.26       Infectious  Recent Labs     10/22/23  0513   PROCALCITONI 0.25     Glucose  Recent Labs     10/22/23  0513 10/23/23  0403   GLUC 126 124               Jose Alfredo Sosa MD

## 2023-10-23 NOTE — ASSESSMENT & PLAN NOTE
9/14 Neck/ soft tissue CT: Large enhancing soft tissue mass identified within the left neck involving multiple spaces. This appears to be centered within the left oropharynx with extensions as described above into multiple spaces. This results in significant airway compromise. 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 Small level 3 and level 4 nodes are seen within the neck. Tracheostomy placed by ENT on 9/17/23  H/o tobacco abuse, daily smoker. .  Preliminary biopsy: Large B-cell lymphoma, germinal center B-cell type, c-MYC and BCL-2 double expression. First chemotherapy was on 9/22.   Heme onc following

## 2023-10-23 NOTE — ASSESSMENT & PLAN NOTE
POA with O2 saturate around 80% on room air. Needed high flow 60 L to bring up her O2 saturation to 95%. Recent hospitalization for respiratory failure second to pneumonia. CTA PE study negative for PE but had RML PNA. Persistent partial atelectasis of the lower lobes noted. Prior to admission was a current daily smoker. Patient had an event of desaturation last night (10/24) and was placed on HFNC. She may require overnight ventilation via the trache. Will continue to assess. Discussed with the CC team this am (10/25) and patient will remain in ICU bed 3 under SD level of care for now. Should her respiratory status decompensate, CC will assume primary care at that time. Pt saturation is in the 90s on 70% FIO2 10L of oxygen via trache collar  PEG tube in place   Patient oxygen was brought up by suctioning, however patient has been refusing regular suctioning of the trachea.   tracheostomy was placed 9/17. Tracheal cuff in place  Completed Decadron. Atrovent/Xopenex  Airway clearance protocol. IS.   Continue 3% saline nebs

## 2023-10-23 NOTE — CASE MANAGEMENT
Case Management Progress Note    Patient name Jaron Vazquez  Location ICU 03/ICU 03 MRN [id number]  : 1955 Date 10/23/2023       LOS (days): 40  Geometric Mean LOS (GMLOS) (days): 26.10  Days to GMLOS:-13.7        OBJECTIVE:    Current admission status: Inpatient  Preferred Pharmacy:   [id number] [place] - [address]. [address] [place] [address]  Phone: [phone] Fax: [phone] [facility] [place], [place] - [address]  [address] [address]  Phone: [phone] Fax: [phone]    Primary Care Provider: Edie Guthrie MD    Primary Insurance: [facility]  Secondary Insurance: [facility]    PROGRESS NOTE:    CM met with pt to review a safe dcp. CM reviewed that the goal early on was for her to transition to a STR at a SNF where she can continue rehab, trach, and PEG care until she can safely return home. CM reviewed the four facilities that have been followin [facility], [facility], [facility], and [facility]. She is aware CM will not know bed availability until closer to dc readiness. CM reviewed that dc readiness for STR will be based on being off the vent and on 28-40% FiO2 via the trach. CM reviewed that if she would like to return home that she would need to be off the Vent as well and on O2 settings that can be managed at home. Reviewed that pt would need to be able to manage her own trach/PEG care. CM reviewed that [facility] can be set up but they are there to teach and order supplies. CM also reviewed if pt has [facility] support during the day that they are unable to do Trach/PEG care. CM did review that LTACH is not an option while she is in active Chemo. Pt reports that her goal is for STR at a SNF facility with a second plan of home with [facility] if she feels comfortable at the time of dc with her Trach/PEG care.  Pt aware CM will send [facility] referral in the event she can return home. Pt aware CM has been keeping Richard Jacobo her  updated so she can coordinate care when pt returns home. Pt aware CM updated her daughter minimally that the rehabs continue to follow and CM would update closer to dc readiness at a SNF level. Pt appreciative of the conversation. She is aware CM is available for any questions. Pt reports it is very important to her that she is apart of her own care and make her own decision related to her care. CM updated Richard Jacobo - pt  that the goal is STR at a SNF, but possible return home if pt is capable of managing her own trach/PEG care with [facility] support. Richard Jacobo did confirm that [facility] do not handle Trach/PEG care. Richard Jacobo reports they can provide nursing services/support at home, but this takes time to have approved/set up.

## 2023-10-23 NOTE — ASSESSMENT & PLAN NOTE
Lab Results   Component Value Date    EGFR 56 10/23/2023    EGFR 68 10/22/2023    EGFR 58 10/21/2023    CREATININE 1.01 10/23/2023    CREATININE 0.86 10/22/2023    CREATININE 0.98 10/21/2023     Baseline Cr between 0.75-1.1  Initial HERIBERTO felt 2/2 prerenal azotemia 2/2 diuresis, reduced PO intake +/- ATN. Patient received 2 doses of Bumex IV 2 g as she had not been responding appropriately to IV 40 Lasix daily. Creatinine went up by 0.5 meeting criteria for an HERIBERTO. This may be prerenal intrinsic or postrenal.  Potential prerenal causes include reduced kidney perfusion due to lisinopril. Intrinsic can also be lisinopril due to ATN, AIN from chemo medications, TLS, crystaline nephropathy from acyclovir, rituxan nephrotoxicity, filgrastim glomerulonephritis. Post renal obstructive could be from urine retention from vincristine or cyclophosphamide bladder fibrosis. Suspect most likely due to medications as a combination of prerenal and intrinsic. FENa was 0.2%, UA shows no casts or signs of infection, urine eosinophils 0%. Patient likely has prerenal HERIBERTO from volume depletion. Received 2 L NS and 1 pRBC and cr went up by 0.07 still and Na and Cl went down, suspect that volume prevented further cr rise and free water excretion impaired by kidney function, allowing hyponatremia to increase. Creatinine increase is likely plateaued and went down the following day. Suspect that now that nephrotoxic medications have been stopped she will continue to respond well to Lasix and creatinine will continue to downtrend. HERIBERTO now resolved. Will be cautious about nephrotoxins and monitor as restarting EPOCH and ppx. Patient gets volume depleted and overloaded very easily. Especially from chemo. 10/19 Heriberto as cr went up by 0.3 in 48 hours from 0.82 on 10/17 to 1.22 on 10/19. Suspect this is prerenal hypovolemic, will see if patient improves with fluids. She gets overloaded easily so if no improvement suspect CRS again.  Received 2L bolus NS. Today cr down to 0.91, so was likely hypovolemic prerenal    Plan: Continue to monitor UO/renal function.  Avoid nephrotoxic agents

## 2023-10-23 NOTE — ASSESSMENT & PLAN NOTE
Large B-cell lymphoma, stage II. First chemo cycle 9/22 4 days of R-EPOCH. 9/27 Rituxan. 9/28 Filgrastim. Acyclovir, Zyloprim, Diflucan, Levaquin, Zofran, Bactrim for chemo prophylaxis, all discontinued with heme-onc's approval as Nafisa reached 4200. I suspect leukocytosis is secondary to the filgrastim. Restarted all ppx 10/13 for [facility] cycle 2 which will run for the next 4 days. Ppx and filgrastim can be discontinued when patient is no longer neutropenic after this cycle again. Platelets have been up trending to 696, which did not happen during cycle 1. This could simply be reactive from inflammation, but could also occur from vincristine which is part of current regimen in combination with the residual effect from filgastrim. Likely just reactive but if continues to rise consider checking blood smear, ferritin, ESR, CRP, Jak2, CALR, MPL, BCR-ABL1. Glucose is increasing on past 2 morning draws, appears to be 2/2 prednisolone from regimen. PICC came out with 3 hours chemo remaining. Tunneled line in R IJ placed yesterday and received cytoxan. Today benadryl with rituxan.  Neutropenic precautions will be needed in a few days    Plan:  Keep Hgb>7 and Plt>20 for chemo  See acute respiratory failure above

## 2023-10-23 NOTE — ASSESSMENT & PLAN NOTE
Wt Readings from Last 3 Encounters:   10/23/23 76.9 kg (169 lb 8.5 oz)   09/07/23 74.6 kg (164 lb 6.4 oz)   08/30/23 73.3 kg (161 lb 9.6 oz)     Volume status: Euvolemic. POA physical (limited): JVD-, HJR-, B/L LE trace to 1+ edema. Echo 8/28/23: LVEF 60%. D1DD. CXR 9/22: Persistent pulmonary venous congestion with small effusions and bibasilar atelectasis. IV lasix given with even I/O.   Repeat CXR on 10/19 shows persistent groudglass opacities likely pulmonary edema and trace effusions

## 2023-10-23 NOTE — ASSESSMENT & PLAN NOTE
Lab Results   Component Value Date    EGFR 56 10/23/2023    EGFR 68 10/22/2023    EGFR 58 10/21/2023    CREATININE 1.01 10/23/2023    CREATININE 0.86 10/22/2023    CREATININE 0.98 10/21/2023   Stable at baseline,   Avoid nephrotoxic medications and fluctuations in blood pressure

## 2023-10-24 LAB
AEROSOL MASK ROOM AIR FIO2: ABNORMAL %
ALBUMIN SERPL BCP-MCNC: 3 G/DL (ref 3.5–5)
ALP SERPL-CCNC: 85 U/L (ref 34–104)
ALT SERPL W P-5'-P-CCNC: 20 U/L (ref 7–52)
ANION GAP SERPL CALCULATED.3IONS-SCNC: 4 MMOL/L
ARTERIAL PATENCY WRIST A: YES
AST SERPL W P-5'-P-CCNC: 17 U/L (ref 13–39)
BASE EXCESS BLDA CALC-SCNC: 3.5 MMOL/L
BILIRUB SERPL-MCNC: 0.24 MG/DL (ref 0.2–1)
BODY TEMPERATURE: 97.9 DEGREES FEHRENHEIT
BUN SERPL-MCNC: 29 MG/DL (ref 5–25)
CALCIUM ALBUM COR SERPL-MCNC: 10.2 MG/DL (ref 8.3–10.1)
CALCIUM SERPL-MCNC: 9.4 MG/DL (ref 8.4–10.2)
CHLORIDE SERPL-SCNC: 102 MMOL/L (ref 96–108)
CO2 SERPL-SCNC: 31 MMOL/L (ref 21–32)
CREAT SERPL-MCNC: 1.04 MG/DL (ref 0.6–1.3)
ERYTHROCYTE [DISTWIDTH] IN BLOOD BY AUTOMATED COUNT: 15.1 % (ref 11.6–15.1)
GFR SERPL CREATININE-BSD FRML MDRD: 54 ML/MIN/1.73SQ M
GLUCOSE SERPL-MCNC: 126 MG/DL (ref 65–140)
HAPTOGLOB SERPL-MCNC: 318 MG/DL (ref 37–355)
HCO3 BLDA-SCNC: 29 MMOL/L (ref 22–28)
HCT VFR BLD AUTO: 22.2 % (ref 34.8–46.1)
HGB BLD-MCNC: 7 G/DL (ref 11.5–15.4)
MAGNESIUM SERPL-MCNC: 2.1 MG/DL (ref 1.9–2.7)
MCH RBC QN AUTO: 30.8 PG (ref 26.8–34.3)
MCHC RBC AUTO-ENTMCNC: 31.5 G/DL (ref 31.4–37.4)
MCV RBC AUTO: 98 FL (ref 82–98)
NON VENT TYPE-AEROSOL MASK: ABNORMAL
O2 CT BLDA-SCNC: 10.8 ML/DL (ref 16–23)
OXYHGB MFR BLDA: 97 % (ref 94–97)
PCO2 BLDA: 49.5 MM HG (ref 36–44)
PH BLDA: 7.38 [PH] (ref 7.35–7.45)
PHOSPHATE SERPL-MCNC: 4 MG/DL (ref 2.3–4.1)
PLATELET # BLD AUTO: 347 THOUSANDS/UL (ref 149–390)
PMV BLD AUTO: 9.8 FL (ref 8.9–12.7)
PO2 BLDA: 126.5 MM HG (ref 75–129)
POTASSIUM SERPL-SCNC: 5 MMOL/L (ref 3.5–5.3)
PROT SERPL-MCNC: 5.7 G/DL (ref 6.4–8.4)
RBC # BLD AUTO: 2.27 MILLION/UL (ref 3.81–5.12)
SODIUM SERPL-SCNC: 137 MMOL/L (ref 135–147)
SPECIMEN SOURCE: ABNORMAL
WBC # BLD AUTO: 2.8 THOUSAND/UL (ref 4.31–10.16)

## 2023-10-24 PROCEDURE — 85027 COMPLETE CBC AUTOMATED: CPT

## 2023-10-24 PROCEDURE — 80053 COMPREHEN METABOLIC PANEL: CPT

## 2023-10-24 PROCEDURE — 84100 ASSAY OF PHOSPHORUS: CPT

## 2023-10-24 PROCEDURE — 94760 N-INVAS EAR/PLS OXIMETRY 1: CPT

## 2023-10-24 PROCEDURE — 82805 BLOOD GASES W/O2 SATURATION: CPT

## 2023-10-24 PROCEDURE — 94640 AIRWAY INHALATION TREATMENT: CPT

## 2023-10-24 PROCEDURE — 36600 WITHDRAWAL OF ARTERIAL BLOOD: CPT

## 2023-10-24 PROCEDURE — 83735 ASSAY OF MAGNESIUM: CPT

## 2023-10-24 PROCEDURE — 92526 ORAL FUNCTION THERAPY: CPT

## 2023-10-24 PROCEDURE — 94150 VITAL CAPACITY TEST: CPT

## 2023-10-24 PROCEDURE — 99233 SBSQ HOSP IP/OBS HIGH 50: CPT | Performed by: INTERNAL MEDICINE

## 2023-10-24 RX ORDER — FOLIC ACID 1 MG/1
1 TABLET ORAL DAILY
Status: DISCONTINUED | OUTPATIENT
Start: 2023-10-24 | End: 2023-12-04 | Stop reason: HOSPADM

## 2023-10-24 RX ADMIN — SERTRALINE 75 MG: 25 TABLET, FILM COATED ORAL at 09:34

## 2023-10-24 RX ADMIN — LEVALBUTEROL HYDROCHLORIDE 1.25 MG: 1.25 SOLUTION RESPIRATORY (INHALATION) at 14:13

## 2023-10-24 RX ADMIN — FAMOTIDINE 20 MG: 20 TABLET, FILM COATED ORAL at 09:34

## 2023-10-24 RX ADMIN — ACYCLOVIR 400 MG: 200 CAPSULE ORAL at 17:41

## 2023-10-24 RX ADMIN — GABAPENTIN 300 MG: 300 CAPSULE ORAL at 22:18

## 2023-10-24 RX ADMIN — LEVOFLOXACIN 250 MG: 250 TABLET, FILM COATED ORAL at 09:35

## 2023-10-24 RX ADMIN — LEVALBUTEROL HYDROCHLORIDE 1.25 MG: 1.25 SOLUTION RESPIRATORY (INHALATION) at 07:44

## 2023-10-24 RX ADMIN — CHLORHEXIDINE GLUCONATE 15 ML: 1.2 SOLUTION ORAL at 22:18

## 2023-10-24 RX ADMIN — MELATONIN 3 MG: at 22:18

## 2023-10-24 RX ADMIN — QUETIAPINE FUMARATE 50 MG: 25 TABLET ORAL at 22:18

## 2023-10-24 RX ADMIN — IPRATROPIUM BROMIDE 0.5 MG: 0.5 SOLUTION RESPIRATORY (INHALATION) at 07:44

## 2023-10-24 RX ADMIN — OXYCODONE HYDROCHLORIDE 5 MG: 5 SOLUTION ORAL at 22:18

## 2023-10-24 RX ADMIN — BUSPIRONE HYDROCHLORIDE 30 MG: 10 TABLET ORAL at 09:34

## 2023-10-24 RX ADMIN — FLUCONAZOLE 200 MG: 100 TABLET ORAL at 09:34

## 2023-10-24 RX ADMIN — FOLIC ACID 1 MG: 1 TABLET ORAL at 09:35

## 2023-10-24 RX ADMIN — GABAPENTIN 300 MG: 300 CAPSULE ORAL at 09:35

## 2023-10-24 RX ADMIN — IPRATROPIUM BROMIDE 0.5 MG: 0.5 SOLUTION RESPIRATORY (INHALATION) at 20:19

## 2023-10-24 RX ADMIN — FAMOTIDINE 20 MG: 20 TABLET, FILM COATED ORAL at 17:41

## 2023-10-24 RX ADMIN — BUSPIRONE HYDROCHLORIDE 30 MG: 10 TABLET ORAL at 17:41

## 2023-10-24 RX ADMIN — ENOXAPARIN SODIUM 40 MG: 40 INJECTION SUBCUTANEOUS at 09:35

## 2023-10-24 RX ADMIN — BUSPIRONE HYDROCHLORIDE 30 MG: 10 TABLET ORAL at 22:18

## 2023-10-24 RX ADMIN — ACYCLOVIR 400 MG: 200 CAPSULE ORAL at 09:37

## 2023-10-24 RX ADMIN — OXYCODONE HYDROCHLORIDE 5 MG: 5 SOLUTION ORAL at 06:04

## 2023-10-24 RX ADMIN — FILGRASTIM-AAFI 300 MCG: 300 INJECTION, SOLUTION SUBCUTANEOUS at 09:37

## 2023-10-24 RX ADMIN — OXYCODONE HYDROCHLORIDE 2.5 MG: 5 SOLUTION ORAL at 09:35

## 2023-10-24 RX ADMIN — IPRATROPIUM BROMIDE 0.5 MG: 0.5 SOLUTION RESPIRATORY (INHALATION) at 14:13

## 2023-10-24 RX ADMIN — NICOTINE 7 MG: 7 PATCH, EXTENDED RELEASE TRANSDERMAL at 09:36

## 2023-10-24 RX ADMIN — OXYCODONE HYDROCHLORIDE 5 MG: 5 SOLUTION ORAL at 15:52

## 2023-10-24 RX ADMIN — GABAPENTIN 300 MG: 300 CAPSULE ORAL at 17:42

## 2023-10-24 RX ADMIN — LEVALBUTEROL HYDROCHLORIDE 1.25 MG: 1.25 SOLUTION RESPIRATORY (INHALATION) at 20:19

## 2023-10-24 RX ADMIN — METOPROLOL TARTRATE 25 MG: 25 TABLET, FILM COATED ORAL at 22:18

## 2023-10-24 RX ADMIN — CHLORHEXIDINE GLUCONATE 15 ML: 1.2 SOLUTION ORAL at 09:35

## 2023-10-24 NOTE — ASSESSMENT & PLAN NOTE
9/14 Neck/ soft tissue CT: Large enhancing soft tissue mass identified within the left neck involving multiple spaces. Centered within the left oropharynx with extensions as described above into multiple spaces. This results in significant airway compromise. 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 Small level 3/level 4 nodes are seen within the neck. Tracheostomy by ENT. Replaced on 9/26. H/o tobacco abuse, daily smoker. On nicotine while inpatient, confirmed with patient she needs nicotine patch. CT soft tissue neck shows reduced mass effect from 9/14 to 10/13. Can consider reducing trach size if continues to improve    Plan:  ENT and heme onc following  Will titrate NRT weekly  As per speech therapist recommendations she will continue puree foods. Not ready to advance diet. Continue TUBE feeds for nutrition.    See acute respiratory failure and large B cell lymphoma above

## 2023-10-24 NOTE — PROGRESS NOTES
[address] [facility]  Progress Note  Name: Cat Godinez  MRN: [id number]  Unit/Bed#: ICU 03 I Date of Admission: 9/13/2023   Date of Service: 10/24/2023 I Hospital Day: 39    Assessment/Plan   * Acute respiratory failure with hypoxia secondary to oropharyngeal mass  Assessment & Plan  Cuffless trach placed 9/17, transitioned to cuffed on 10/3. Oxygen requirements not improving. For mental health patient is on buspirone, quetiapine, and sertraline. For pain, gabapentin, oxycodone scheduled and prn, prn oxycodone mainly utilized for increased pain during chemo and after a bronch. On Guaifenesin, Atrovent and Xopenex for airway maintenance. No improvement in bilateral consolidation or atelectasis and groundglass opacities on repeat CT and chest x-rays. Patient not receiving proper positive pressure to open up atelectatic lung. Bronchial cx with 3 colonies CoNS. PCP PCR invalid, repeat negative. CTCAP indicates additional groundglass opacity with paraseptal emphysema, which may be contributing to inability to remain off vent. SBTs have been unsuccessful to place patient on trach collar since return to ICU. Patient tolerated high flow all day and overnight. Back on vent overnight, failed trach collar o/n. CXR 10/19 appears unchanged from 10/16 just not good breath during image, less of right lung visualized this time. Did well overnight on trach collar trial and today she is at 11 L. She did not desaturate or become dyspneic. Plan:  Continue trach collar during the day and at night. Goal 350 TV, 12/6 pressure and PEEP 6. Maintain cuff overnight from 9 pm to 6 am for positive pressure, can open cuff during the day for patient to speak. Large cell lymphoma ([address])  Assessment & Plan  Large B-cell lymphoma, stage II. First chemo cycle 9/22 4 days of R-EPOCH. 9/27 Rituxan. 9/28 Filgrastim.  Acyclovir, Zyloprim, Diflucan, Levaquin, Zofran, Bactrim for chemo prophylaxis, all discontinued with heme-onc's approval as Nafisa reached 4200. I suspect leukocytosis is secondary to the filgrastim. Restarted all ppx 10/13 for [facility] cycle 2 which will run for the next 4 days. Ppx and filgrastim can be discontinued when patient is no longer neutropenic after this cycle again. Platelets have been up trending to 696, which did not happen during cycle 1. This could simply be reactive from inflammation, but could also occur from vincristine which is part of current regimen in combination with the residual effect from filgastrim. Likely just reactive but if continues to rise consider checking blood smear, ferritin, ESR, CRP, Jak2, CALR, MPL, BCR-ABL1. Glucose is increasing on past 2 morning draws, appears to be 2/2 prednisolone from regimen. PICC came out with 3 hours chemo remaining. Tunneled line in R IJ placed yesterday and received cytoxan. Today benadryl with rituxan. Neutropenic precautions will be needed in a few days    Plan:  Keep Hgb>7 and Plt>20 for chemo   Continue rituximab infusions as per heme/onc plan. Receiving daily filgrastim. See acute respiratory failure above    Oropharyngeal mass  Assessment & Plan  9/14 Neck/ soft tissue CT: Large enhancing soft tissue mass identified within the left neck involving multiple spaces. Centered within the left oropharynx with extensions as described above into multiple spaces. This results in significant airway compromise. 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 Small level 3/level 4 nodes are seen within the neck. Tracheostomy by ENT. Replaced on 9/26. H/o tobacco abuse, daily smoker. On nicotine while inpatient, confirmed with patient she needs nicotine patch. CT soft tissue neck shows reduced mass effect from 9/14 to 10/13. Can consider reducing trach size if continues to improve    Plan:  ENT and heme onc following  Will titrate NRT weekly  As per speech therapist recommendations she will continue puree foods. Not ready to advance diet.   Continue TUBE feeds for nutrition. See acute respiratory failure and large B cell lymphoma above      Decreased hemoglobin  Assessment & Plan  Patient received 1 PRBC transfusion on 10/5. Hemoglobin trended 8> 7.0 > 7.3. No sites of bleeding, no black stools. 10/23 iron panel shows decreased iron saturation, TIBC and iron. Increased ferritin. Indicative of inflammatory anemia. Vit B12 761 WNL  Folate is low 5.5  Hemolysis smear, no schistocytes or helmet cells. Retic count <10K low. Plan: Folate supplementation  Venofer infusion not indicated at this time. Trend hemoglobin and transfuse for <7. RML pneumonia-resolved as of 9/22/2023  Assessment & Plan  9/13 CT PE study: Patchy consolidation right middle lobe, new from 8/25/2023, compatible with pneumonia. No leukocytosis, no fever/chills. Positive for sore throat, cough. New onset pneumonia after recent hospitalization and antibiotics treatment. 9/14: Bronchoscopy/ ABL. MRSA negative. ABL revealed 2+ Growth of Candida albicans, suspected contaminant. PO (acute kidney injury) (HCC)-resolved as of 9/21/2023  Assessment & Plan  Lab Results   Component Value Date    CREATININE 1.01 10/23/2023    CREATININE 0.86 10/22/2023    CREATININE 0.98 10/21/2023       Likely prerenal.  Second to poor oral intake versus poor urine output. Baseline creatinine 1 to 1.2. Cr at baseline  Plan:  Urinary retention protocol, Daily weights, I/O  Trend Cr daily    (HFpEF) heart failure with preserved ejection fraction [facility])  Assessment & Plan  Wt Readings from Last 3 Encounters:   10/23/23 76.9 kg (169 lb 8.5 oz)   09/07/23 74.6 kg (164 lb 6.4 oz)   08/30/23 73.3 kg (161 lb 9.6 oz)     Lab Results   Component Value Date    LVEF 60 08/28/2023     (H) 09/29/2023     (H) 09/13/2023     Echo 8/28/23: LVEF 60%. Plan:  K > 4   Measure I/O      Angioedema  Assessment & Plan  POA in [facility] ([place]) ED: Swollen tongue, soft and hard palate with severe angioedema.  Decadron 10 mg, Benadryl 25 mg given. 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 Plan:  Cuffless trach 9/17, cuffed Shiley XLT #7 10/3  See acute respiratory failure and oropharyngeal mass above  Continue to wean vent. Ambulatory dysfunction  Assessment & Plan  2/2 Impacted by chronic pain syndrome + prolonged hospital stay. Continue OOB with PT. PT rec post acute rehab however reportedly Cannot get LTACH placement while getting chemo per CM  She is aware STR SNFs continue to follow and treatment can be done from a SNF level of care. PT would need to be on trach collar for at least 48 hours with no need for the vent. Aware that pt would need to be around 28% FiO2     Pain syndrome, chronic  Assessment & Plan  Home regimen: Oxycodone 5 mg BID, Tylenol 650 mg q8 prn. Gabapentin 600 mg TID. Medical marijuana. Lumbar radiculopathy and impaired ambulatory dysfunction secondary to pain. Has bowel regimen and is having bowel movements daily. Patient required additional pain management during previous cycle and has some additional pain from tunneled line placement    Plan:  Continue gabapentin 300 mg TID, oxycodone 5 mg TID, prn Tylenol 650 mg q6. Oxycodone 2.5 mg prn can d/c a day after tunneled line replaced    Benign essential hypertension  Assessment & Plan  Home regimen Coreg 12.5 mg BID, Amlodipine 10 mg daily, and lisinopril 40 mg. All discontinued at this time secondary to hypotension and PO since cycle 1. but stable currently without any antihypertensives. Systolic persistently elevated and tachycardic, potentially secondary to pain. Patient was more hypotensive during last chemo. Coreg contraindicated during chemo, since cycles are every other week, would be better to stick with lopressor during duration of therapy as opposed to switching constantly. Plan:  Monitor vitals.   Continue Norvasc 10 and lopressor 25 bid  Prn hydralazine for SBP > 160    Hyperlipidemia-resolved as of 10/2/2023  Assessment & Plan  Lab Results   Component Value Date    CHOLESTEROL 118 10/09/2023    CHOLESTEROL 203 (H) 01/24/2023    CHOLESTEROL 177 08/11/2022     Lab Results   Component Value Date    HDL 14 (L) 10/09/2023    HDL 45 (L) 01/24/2023    HDL 37 (L) 08/11/2022     Lab Results   Component Value Date    TRIG 144 10/09/2023    TRIG 166 (H) 09/16/2023    TRIG 160 (H) 01/24/2023     Lab Results   Component Value Date    NONHDLC 104 10/09/2023    [address] 146 07/12/2018    [address] 207 (H) 04/29/2013     Continue outpatient diet and lifestyle modification. Depression  Assessment & Plan  Patient on Zoloft 75 daily and Seroquel hs  Patient is depressed, multiple family/palliative discussions. patient is firm that she wants to live and to fight, she is just tired. Currently goal is disease treatment oriented. Full code. No SI/HI ideations. Palliative signed off, will reconsult if patient changes her mind regarding wishes. On 10/20/23 she decided to leave AMA. Now waiting for CM and machine     Generalized abdominal pain  Assessment & Plan  Patient reports abdominal pain which is unchanged since 9/22 no guarding on exam. Takes Famotidine for GERD at home. Alk phos 10/2 145, 10/20 79. CTCAP reveals complex right upper pole renal lesion requiring possible outpatient MRI    - Continue Famotidine  - On bowel regimen with Senna and Miralax prn    Chronic Superficial thrombophlebitis  Assessment & Plan  Right forearm soreness. RUE US: No acute or chronic deep vein thrombosis. 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 PO not severe enough to require renal dosing at this time, will continue to monitor and adjust dosing as needed.      -Continue DVT ppx with Lovenox 40    Blister (nonthermal) of left forearm, sequela  Assessment & Plan  Blisters of left upper extremity healing, no signs of infection, continue daily wound care.      CKD (chronic kidney disease) stage 3, GFR 30-59 ml/min [facility])  Assessment & Plan  Lab Results   Component Value Date    EGFR 56 10/23/2023    EGFR 68 10/22/2023    EGFR 58 10/21/2023    CREATININE 1.01 10/23/2023    CREATININE 0.86 10/22/2023    CREATININE 0.98 10/21/2023     Baseline Cr between 0.75-1.1  Initial HERIBERTO felt 2/2 prerenal azotemia 2/2 diuresis, reduced PO intake +/- ATN. Patient received 2 doses of Bumex IV 2 g as she had not been responding appropriately to IV 40 Lasix daily. Creatinine went up by 0.5 meeting criteria for an HERIBERTO. This may be prerenal intrinsic or postrenal.  Potential prerenal causes include reduced kidney perfusion due to lisinopril. Intrinsic can also be lisinopril due to ATN, AIN from chemo medications, TLS, crystaline nephropathy from acyclovir, rituxan nephrotoxicity, filgrastim glomerulonephritis. Post renal obstructive could be from urine retention from vincristine or cyclophosphamide bladder fibrosis. Suspect most likely due to medications as a combination of prerenal and intrinsic. FENa was 0.2%, UA shows no casts or signs of infection, urine eosinophils 0%. Patient likely has prerenal HERIBERTO from volume depletion. Received 2 L NS and 1 pRBC and cr went up by 0.07 still and Na and Cl went down, suspect that volume prevented further cr rise and free water excretion impaired by kidney function, allowing hyponatremia to increase. Creatinine increase is likely plateaued and went down the following day. Suspect that now that nephrotoxic medications have been stopped she will continue to respond well to Lasix and creatinine will continue to downtrend. HERIBERTO now resolved. Will be cautious about nephrotoxins and monitor as restarting EPOCH and ppx. Patient gets volume depleted and overloaded very easily. Especially from chemo. 10/19 Heriberto as cr went up by 0.3 in 48 hours from 0.82 on 10/17 to 1.22 on 10/19. Suspect this is prerenal hypovolemic, will see if patient improves with fluids.  She gets overloaded easily so if no improvement suspect CRS again. Received 2L bolus NS. Today cr down to 0.91, so was likely hypovolemic prerenal    Plan: Continue to monitor UO/renal function. Avoid nephrotoxic agents    COPD without exacerbation (HCC)-resolved as of 9/26/2023  Assessment & Plan  Continue vent with nebs. Wean off vent. Encephalopathy-resolved as of 9/21/2023  Assessment & Plan  9/19- Pt appeared to be AAO x3. Improving. ICU Core Measures     Vented Patient  VAP Bundle  VAP bundle ordered     A: Assess, Prevent, and Manage Pain Has pain been assessed? Yes  Need for changes to pain regimen? No   B: Both Spontaneous Awakening Trials (SATs) and Spontaneous Breathing Trials (SBTs) Plan to perform spontaneous awakening trial today? N/A      C: Choice of Sedation RASS Goal: 0 Alert and Calm  Need for changes to sedation or analgesia regimen? NA   D: Delirium CAM-ICU: Negative   E: Early Mobility  Plan for early mobility? Yes   F: Family Engagement Plan for family engagement today? Yes       Antibiotic Review: Not on any antibiotics    Review of Invasive Devices:      Central access plan: Medications requiring central line      Prophylaxis:  VTE VTE covered by:  enoxaparin, Subcutaneous, 40 mg at 10/24/23 0935       Stress Ulcer  covered byfamotidine (PEPCID) tablet 20 mg [[id number]]       Subjective   HPI/24hr events: Patient was on trach collar last night. She did not desturate or complained having difficulty breathing. Review of Systems   Constitutional:  Negative for chills and fever. HENT:  Negative for ear pain and sore throat. Eyes:  Negative for pain and visual disturbance. Respiratory:  Negative for cough and shortness of breath. Cardiovascular:  Negative for chest pain and palpitations. Gastrointestinal:  Positive for abdominal pain. Negative for vomiting. Endocrine: Positive for polyuria. Genitourinary:  Negative for dysuria and hematuria. Musculoskeletal:  Negative for arthralgias and back pain.    Skin: Negative for color change and rash. Neurological:  Negative for seizures and syncope. All other systems reviewed and are negative. Objective                            Vitals I/O      Most Recent Min/Max in 24hrs   Temp 98.6 °F (37 °C) Temp  Min: 97.1 °F (36.2 °C)  Max: 99.6 °F (37.6 °C)   Pulse 100 Pulse  Min: 84  Max: 111   Resp 22 Resp  Min: 17  Max: 32   /66 BP  Min: 94/59  Max: 136/68   O2 Sat 100 % SpO2  Min: 92 %  Max: 100 %      Intake/Output Summary (Last 24 hours) at 10/24/2023 1224  Last data filed at 10/24/2023 0800  Gross per 24 hour   Intake 668 ml   Output 835 ml   Net -167 ml         Diet Enteral/Parenteral; Tube Feeding with Oral Diet; Two Telly HN; Continuous; 38; 190; Water; Every 4 hours; Dysphagia/Modified Consistency; Dysphagia 1-Pureed; Honey Thick Liquid; Dysphagia 1-Pureed, Honey Thick Liquid     Invasive Monitoring Physical exam    Physical Exam  Eyes:      Pupils: Pupils are equal, round, and reactive to light. Skin:     General: Skin is warm. HENT:      Head: Normocephalic and atraumatic. Mouth/Throat:      Mouth: Mucous membranes are moist.   Cardiovascular:      Rate and Rhythm: Normal rate and regular rhythm. Pulses: Normal pulses. Heart sounds: Normal heart sounds. Musculoskeletal:         General: No swelling. Normal range of motion. Right lower leg: No edema. Left lower leg: No edema. Constitutional:       General: She is not in acute distress. Appearance: She is well-developed. She is not ill-appearing. Pulmonary:      Effort: Pulmonary effort is normal.      Breath sounds: Normal breath sounds. Psychiatric:         Mood and Affect:  mood and affect abnormal.  Neurological:      General: No focal deficit present. Mental Status: She is alert and oriented to person, place and time. Motor: No motor deficit.    Genitourinary/Anorectal:  Slater        Diagnostic Studies      EKG: NSR  Imaging: N/S Medications:  Scheduled PRN   acyclovir, 400 mg, BID  amLODIPine, 10 mg, Daily  busPIRone, 30 mg, TID  chlorhexidine, 15 mL, Q12H SAHLEY  enoxaparin, 40 mg, Q24H ASHLEY  famotidine, 20 mg, BID  Filgrastim-aafi, 300 mcg, Daily  fluconazole, 899 mg, Daily  folic acid, 1 mg, Daily  gabapentin, 300 mg, TID  levalbuterol, 1.25 mg, TID   And  ipratropium, 0.5 mg, TID  levofloxacin, 250 mg, Q24H ASHLEY  melatonin, 3 mg, HS  metoprolol tartrate, 25 mg, Q12H [address]  nicotine, 7 mg, Daily  oxyCODONE, 5 mg, Q8H  QUEtiapine, 50 mg, HS  senna, 8.8 mg, HS  sertraline, 75 mg, Daily  sulfamethoxazole-trimethoprim, 1 tablet, Once per day on Mon Wed Fri      acetaminophen, 650 mg, Q6H PRN  alteplase, 2 mg, Q1MIN PRN  alteplase, 2 mg, Q1MIN PRN  guaiFENesin, 200 mg, Q6H PRN  hydrALAZINE, 5 mg, Q6H PRN  ondansetron, 4 mg, Q8H PRN  ondansetron, 4 mg, Q8H PRN  oxyCODONE, 2.5 mg, Q6H PRN  polyethylene glycol, 17 g, Daily PRN  sodium chloride, 4 mL, Q6H PRN       Continuous          Labs:    CBC    Recent Labs     10/23/23  0403 10/23/23  1800 10/24/23  0746   WBC 3.54*  --  2.80*   HGB 7.0* 7.3* 7.0*   HCT 22.4* 23.2* 22.2*   *  --  347   BANDSPCT 4  --   --         BMP    Recent Labs     10/23/23  0403 10/24/23  0746   SODIUM 136 137   K 5.1 5.0    102   CO2 30 31   AGAP 4 4   BUN 30* 29*   CREATININE 1.01 1.04   CALCIUM 9.4 9.4      Coags    No recent results     Additional Electrolytes  Recent Labs     10/23/23  0403 10/24/23  0746   MG 2.0 2.1   PHOS 3.5 4.0          Blood Gas    Recent Labs     10/24/23  1127   PHART 7.385   WAZ5HMC 49.5*   PO2ART 126.5   QPA0PZG 29.0*   BEART 3.5   SOURCE Radial, Right     Recent Labs     10/24/23  1127   SOURCE Radial, Right    LFTs  Recent Labs     10/23/23  0403 10/24/23  0746   ALT 15 20   AST 11* 17   ALKPHOS 67 85   ALB 3.0* 3.0*   TBILI 0.26 0.24       Infectious  No recent results  Glucose  Recent Labs     10/23/23  0403 10/24/23  0746   GLUC 124 126               Jose Alfredo Sosa MD

## 2023-10-24 NOTE — SPEECH THERAPY NOTE
Speech Language/Pathology    Speech/Language Pathology Progress Note    Patient Name: [facility]  PQFLK'M Date: 10/24/2023         Subjective:  Pt seen for dysphagia tx follow up. Pt on trach collar during the day, vent at night. Pt sitting in chair, PMV in place, O2 sats 100%. Reviewed strategies recommended from VBS, pt not able to recall chin tuck, but stated she's been doing it. Objective:  Voice is weak most of the time, but audible and intelligible. Pt stated the pureed food is not desirable. We discussed natural puree foods ie pudding, yogurt, mashed potatoes, cream of wheat and she has the PEG for nutrition. Pt stated she liked the pureed carrots. Pt requested tapioca pudding, not sure if avail in hosp on puree diet, but will allow it brought in by family- discussed w/ nsg. Pt requested van pudding and gingerale (thickened to HT consistency). Pt ate slowly, stated she is afraid and cautious. Pt took pudding and HT gingerale by tsp, exhibited good oral control, slow manipulation and transfer. Pt needed min cues for chin tuck when swallowing, which were prompt and complete. Pt stated she is trying to put effort into her swallow. No coughing noted. Wet voice noted initially w/ pudding, but no po residue noted tracheal secretions after session w/ PMV and vol cough. Assessment:  Pt remains cautious w/ po intake, but no overt s/s aspiration noted with puree and HTL by tsp. Plan/Recommendations:  Cont puree and HTL w/ chin tuck and effortful swallows.    Will cont to follow      Gretchen Boo CCC-SLP  Speech Pathologist  Available via  tiger text

## 2023-10-24 NOTE — ASSESSMENT & PLAN NOTE
POA in [facility] ([place]) ED: Swollen tongue, soft and hard palate with severe angioedema. Decadron 10 mg, Benadryl 25 mg given. 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 Plan:  Cuffless trach 9/17, cuffed Shiley XLT #7 10/3  See acute respiratory failure and oropharyngeal mass above  Continue to wean vent.

## 2023-10-24 NOTE — ASSESSMENT & PLAN NOTE
Home regimen Coreg 12.5 mg BID, Amlodipine 10 mg daily, and lisinopril 40 mg. All discontinued at this time secondary to hypotension and OP since cycle 1. but stable currently without any antihypertensives. Systolic persistently elevated and tachycardic, potentially secondary to pain. Patient was more hypotensive during last chemo. Coreg contraindicated during chemo, since cycles are every other week, would be better to stick with lopressor during duration of therapy as opposed to switching constantly. Plan:  Monitor vitals.   Continue Norvasc 10 and lopressor 25 bid  Prn hydralazine for SBP > 160

## 2023-10-24 NOTE — ASSESSMENT & PLAN NOTE
Patient received 1 PRBC transfusion on 10/5. Hemoglobin trended 8> 7.0 > 7.3. No sites of bleeding, no black stools. 10/23 iron panel shows decreased iron saturation, TIBC and iron. Increased ferritin. Indicative of inflammatory anemia. Vit B12 761 WNL  Folate is low 5.5  Hemolysis smear, no schistocytes or helmet cells. Retic count <10K low. Plan: Folate supplementation  Venofer infusion not indicated at this time. Trend hemoglobin and transfuse for <7.

## 2023-10-24 NOTE — PLAN OF CARE
Problem: MOBILITY - ADULT  Goal: Maintain or return to baseline ADL function  Description: INTERVENTIONS:  -  Assess patient's ability to carry out ADLs; assess patient's baseline for ADL function and identify physical deficits which impact ability to perform ADLs (bathing, care of mouth/teeth, toileting, grooming, dressing, etc.)  - Assess/evaluate cause of self-care deficits   - Assess range of motion  - Assess patient's mobility; develop plan if impaired  - Assess patient's need for assistive devices and provide as appropriate  - Encourage maximum independence but intervene and supervise when necessary  - Involve family in performance of ADLs  - Assess for home care needs following discharge   - Consider OT consult to assist with ADL evaluation and planning for discharge  - Provide patient education as appropriate  Outcome: Progressing  Goal: Maintains/Returns to pre admission functional level  Description: INTERVENTIONS:  - Perform BMAT or MOVE assessment daily.   - Set and communicate daily mobility goal to care team and patient/family/caregiver.    - Out of bed for toileting  - Record patient progress and toleration of activity level   Outcome: Progressing     Problem: Prexisting or High Potential for Compromised Skin Integrity  Goal: Skin integrity is maintained or improved  Description: INTERVENTIONS:  - Identify patients at risk for skin breakdown  - Assess and monitor skin integrity  - Assess and monitor nutrition and hydration status  - Monitor labs   - Assess for incontinence   - Turn and reposition patient  - Assist with mobility/ambulation  - Relieve pressure over bony prominences  - Avoid friction and shearing  - Provide appropriate hygiene as needed including keeping skin clean and dry  - Evaluate need for skin moisturizer/barrier cream  - Collaborate with interdisciplinary team   - Patient/family teaching  - Consider wound care consult   Outcome: Progressing     Problem: Nutrition/Hydration-ADULT  Goal: Nutrient/Hydration intake appropriate for improving, restoring or maintaining nutritional needs  Description: Monitor and assess patient's nutrition/hydration status for malnutrition. Collaborate with interdisciplinary team and initiate plan and interventions as ordered. Monitor patient's weight and dietary intake as ordered or per policy. Utilize nutrition screening tool and intervene as necessary. Determine patient's food preferences and provide high-protein, high-caloric foods as appropriate.      INTERVENTIONS:  - Monitor oral intake, urinary output, labs, and treatment plans  - Assess nutrition and hydration status and recommend course of action  - Evaluate amount of meals eaten  - Assist patient with eating if necessary   - Allow adequate time for meals  - Recommend/ encourage appropriate diets, oral nutritional supplements, and vitamin/mineral supplements  - Order, calculate, and assess calorie counts as needed  - Recommend, monitor, and adjust tube feedings and TPN/PPN based on assessed needs  - Assess need for intravenous fluids  - Provide specific nutrition/hydration education as appropriate  - Include patient/family/caregiver in decisions related to nutrition  Outcome: Progressing

## 2023-10-24 NOTE — CASE MANAGEMENT
Case Management Progress Note    Patient name Cosme Crigler  Location ICU 03/ICU 03 MRN [id number]  : 1955 Date 10/24/2023       LOS (days): 41  Geometric Mean LOS (GMLOS) (days): 26.10  Days to GMLOS:-14.7        OBJECTIVE:        Current admission status: Inpatient  Preferred Pharmacy:   [facility]/pharmacy #[id number]- [place] - [address]. [address] [address]  Phone: [phone] Fax: [phone] [place], [place] - [address] [address]  Phone: [phone] Fax: [phone]    Primary Care Provider: Marcos Rosa MD    Primary Insurance: [employer] REP  Secondary Insurance: [facility]    PROGRESS NOTE:    Pt reviewed during mobility rounds. As per nurse, pt has been on 28% FIO2 for 24 hours. CM updated clinical information to [facility] and updated facilities. CM requested review and if they are able to offer a bed. CM will review updated list of facilities with pt once available.

## 2023-10-24 NOTE — ASSESSMENT & PLAN NOTE
Cuffless trach placed 9/17, transitioned to cuffed on 10/3. Oxygen requirements not improving. For mental health patient is on buspirone, quetiapine, and sertraline. For pain, gabapentin, oxycodone scheduled and prn, prn oxycodone mainly utilized for increased pain during chemo and after a bronch. On Guaifenesin, Atrovent and Xopenex for airway maintenance. No improvement in bilateral consolidation or atelectasis and groundglass opacities on repeat CT and chest x-rays. Patient not receiving proper positive pressure to open up atelectatic lung. Bronchial cx with 3 colonies CoNS. PCP PCR invalid, repeat negative. CTCAP indicates additional groundglass opacity with paraseptal emphysema, which may be contributing to inability to remain off vent. SBTs have been unsuccessful to place patient on trach collar since return to ICU. Patient tolerated high flow all day and overnight. Back on vent overnight, failed trach collar o/n. CXR 10/19 appears unchanged from 10/16 just not good breath during image, less of right lung visualized this time. Did well overnight on trach collar trial and today she is at 11 L. She did not desaturate or become dyspneic. Plan:  Continue trach collar during the day and at night. Goal 350 TV, 12/6 pressure and PEEP 6. Maintain cuff overnight from 9 pm to 6 am for positive pressure, can open cuff during the day for patient to speak.

## 2023-10-24 NOTE — CASE MANAGEMENT
Case Management Progress Note    Patient name Joseph Mcgarry  Location ICU 03/ICU 03 MRN [id number]  : 1955 Date 10/24/2023       LOS (days): 41  Geometric Mean LOS (GMLOS) (days): 26.10  Days to GMLOS:-14.9        OBJECTIVE:        Current admission status: Inpatient  Preferred Pharmacy:   [facility]/pharmacy #[id number]- [place] - [address]. [address] [facility] [address]  Phone: [phone] Fax: [address] [address]  Phone: [phone] Fax: [phone]    Primary Care Provider: Soha Stephen MD    Primary Insurance: [employer] REP  Secondary Insurance: [facility]    PROGRESS NOTE:    CM spoke with pt daughter, Macie Bowens, this morning and updated her on conversation from yesterday. CM did review that pt would get teaching on trach/PEG care at the facility prior to returning home. CM reviewed VNA services after STR. Macie Bowens also aware that [facility] through waiver could not do Trach/PEG care when pt returns home. CM did review that [facility] has been kept updated on pt care. CM provided pt and her daughter, Macie Bowens, with the list of four facilities that have been following pt for dc. CM did review that they need to re-review clinicals and update CM If they can accept. They will review list and list the facilities in order from 1-4 for preference. Aware that CM is awaiting confirmation that [facility] can assist with transport needs for OP Chemo. CM did review that pt can go to STR and have pt come for OP Infusions for the Chemo. Pt and daughter aware CM will f/u tomorrow for options for STR. CM did review that [facility] has a pulmonary program and RT on site.

## 2023-10-24 NOTE — QUICK NOTE
Critical Care Interval Transfer Note:    Please refer to progress note from earlier today for full details. Barriers to discharge:   Neuro- patient AAOx3. Monitor for any signs of lethargy as she is off of vent since 24 hours and goal is to stay off of vent. Pulm-patient is on trach collar. Will be discharged with trach collar and pulmonology will follow up on her in patient, Dr. Emma Rincon. Heme/onc-patient will be discharged to [facility] and will continue to receive infusions from there. CM-patient already has options for STR available. She needs to be off of ventilatory support for 48 hours before patient is discharged to STR. Patient already off of ventilator for 24 hours. Speech and swallow- Patient currently on honey thick liquids and pureed foods. Discussed with speech therapist and currently she is not comfortable advancing diet. Will continue diet as recommended. Consults: IP CONSULT TO CASE MANAGEMENT  IP CONSULT TO ENT  IP CONSULT TO ONCOLOGY  IP CONSULT TO PALLIATIVE CARE  INPATIENT CONSULT TO IR  INPATIENT CONSULT TO IR    Recommended to review admission imaging for incidental findings and document in discharge navigator: Chart reviewed, no known incidental findings noted at this time. Discharge Plan: Anticipate discharge in 48 hrs to rehab facility. Central access plan: No peripheral access able to be obtained. Plan to remove access at the time of discharge. PT Recommendations: Post acute rehabilitation services  OT Recommendations: Post acute rehabilitation services      Patient seen and evaluated by Critical Care today and deemed to be appropriate for transfer to Stepdown Level 2. Spoke to Dr. Carla Villalba from [facility] to accept transfer. Critical care can be contacted via [employer] with any questions or concerns.

## 2023-10-24 NOTE — ASSESSMENT & PLAN NOTE
Lab Results   Component Value Date    CHOLESTEROL 118 10/09/2023    CHOLESTEROL 203 (H) 01/24/2023    CHOLESTEROL 177 08/11/2022     Lab Results   Component Value Date    HDL 14 (L) 10/09/2023    HDL 45 (L) 01/24/2023    HDL 37 (L) 08/11/2022     Lab Results   Component Value Date    TRIG 144 10/09/2023    TRIG 166 (H) 09/16/2023    TRIG 160 (H) 01/24/2023     Lab Results   Component Value Date    NONHDLC 104 10/09/2023    [address] 146 07/12/2018    [address] 207 (H) 04/29/2013     Continue outpatient diet and lifestyle modification.

## 2023-10-25 PROBLEM — D61.818 PANCYTOPENIA (HCC): Status: ACTIVE | Noted: 2023-10-23

## 2023-10-25 LAB
ABO GROUP BLD: NORMAL
ANION GAP SERPL CALCULATED.3IONS-SCNC: 3 MMOL/L
BASE EX.OXY STD BLDV CALC-SCNC: 41.1 % (ref 60–80)
BASE EXCESS BLDV CALC-SCNC: 5.7 MMOL/L
BLD GP AB SCN SERPL QL: NEGATIVE
BUN SERPL-MCNC: 28 MG/DL (ref 5–25)
CALCIUM SERPL-MCNC: 9.4 MG/DL (ref 8.4–10.2)
CHLORIDE SERPL-SCNC: 101 MMOL/L (ref 96–108)
CO2 SERPL-SCNC: 31 MMOL/L (ref 21–32)
CREAT SERPL-MCNC: 1.11 MG/DL (ref 0.6–1.3)
ERYTHROCYTE [DISTWIDTH] IN BLOOD BY AUTOMATED COUNT: 15.2 % (ref 11.6–15.1)
GFR SERPL CREATININE-BSD FRML MDRD: 50 ML/MIN/1.73SQ M
GLUCOSE SERPL-MCNC: 104 MG/DL (ref 65–140)
HCO3 BLDV-SCNC: 31.8 MMOL/L (ref 24–30)
HCT VFR BLD AUTO: 22.1 % (ref 34.8–46.1)
HCT VFR BLD AUTO: 26.7 % (ref 34.8–46.1)
HGB BLD-MCNC: 6.8 G/DL (ref 11.5–15.4)
HGB BLD-MCNC: 8.3 G/DL (ref 11.5–15.4)
MCH RBC QN AUTO: 30 PG (ref 26.8–34.3)
MCHC RBC AUTO-ENTMCNC: 30.8 G/DL (ref 31.4–37.4)
MCV RBC AUTO: 97 FL (ref 82–98)
O2 CT BLDV-SCNC: 5.1 ML/DL
OTHER FIO2: 40 %
PCO2 BLDV: 55.2 MM HG (ref 42–50)
PH BLDV: 7.38 [PH] (ref 7.3–7.4)
PLATELET # BLD AUTO: 312 THOUSANDS/UL (ref 149–390)
PMV BLD AUTO: 10 FL (ref 8.9–12.7)
PO2 BLDV: 23.9 MM HG (ref 35–45)
POTASSIUM SERPL-SCNC: 5.2 MMOL/L (ref 3.5–5.3)
RBC # BLD AUTO: 2.27 MILLION/UL (ref 3.81–5.12)
RH BLD: POSITIVE
SODIUM SERPL-SCNC: 135 MMOL/L (ref 135–147)
SPECIMEN EXPIRATION DATE: NORMAL
WBC # BLD AUTO: 3.66 THOUSAND/UL (ref 4.31–10.16)

## 2023-10-25 PROCEDURE — 86850 RBC ANTIBODY SCREEN: CPT

## 2023-10-25 PROCEDURE — 86901 BLOOD TYPING SEROLOGIC RH(D): CPT

## 2023-10-25 PROCEDURE — 94760 N-INVAS EAR/PLS OXIMETRY 1: CPT

## 2023-10-25 PROCEDURE — 94640 AIRWAY INHALATION TREATMENT: CPT

## 2023-10-25 PROCEDURE — 86923 COMPATIBILITY TEST ELECTRIC: CPT

## 2023-10-25 PROCEDURE — 85018 HEMOGLOBIN: CPT

## 2023-10-25 PROCEDURE — 86900 BLOOD TYPING SEROLOGIC ABO: CPT

## 2023-10-25 PROCEDURE — P9016 RBC LEUKOCYTES REDUCED: HCPCS

## 2023-10-25 PROCEDURE — 82805 BLOOD GASES W/O2 SATURATION: CPT

## 2023-10-25 PROCEDURE — 80048 BASIC METABOLIC PNL TOTAL CA: CPT

## 2023-10-25 PROCEDURE — 99291 CRITICAL CARE FIRST HOUR: CPT | Performed by: INTERNAL MEDICINE

## 2023-10-25 PROCEDURE — 85027 COMPLETE CBC AUTOMATED: CPT

## 2023-10-25 PROCEDURE — 85014 HEMATOCRIT: CPT

## 2023-10-25 PROCEDURE — 99232 SBSQ HOSP IP/OBS MODERATE 35: CPT | Performed by: NURSE PRACTITIONER

## 2023-10-25 PROCEDURE — 94664 DEMO&/EVAL PT USE INHALER: CPT

## 2023-10-25 PROCEDURE — 30233N1 TRANSFUSION OF NONAUTOLOGOUS RED BLOOD CELLS INTO PERIPHERAL VEIN, PERCUTANEOUS APPROACH: ICD-10-PCS | Performed by: INTERNAL MEDICINE

## 2023-10-25 RX ORDER — BUSPIRONE HYDROCHLORIDE 10 MG/1
30 TABLET ORAL 3 TIMES DAILY
Status: DISCONTINUED | OUTPATIENT
Start: 2023-10-25 | End: 2023-12-04 | Stop reason: HOSPADM

## 2023-10-25 RX ADMIN — FAMOTIDINE 20 MG: 20 TABLET, FILM COATED ORAL at 09:15

## 2023-10-25 RX ADMIN — QUETIAPINE FUMARATE 50 MG: 25 TABLET ORAL at 21:29

## 2023-10-25 RX ADMIN — LEVALBUTEROL HYDROCHLORIDE 1.25 MG: 1.25 SOLUTION RESPIRATORY (INHALATION) at 13:58

## 2023-10-25 RX ADMIN — MELATONIN 3 MG: at 21:29

## 2023-10-25 RX ADMIN — LEVALBUTEROL HYDROCHLORIDE 1.25 MG: 1.25 SOLUTION RESPIRATORY (INHALATION) at 20:57

## 2023-10-25 RX ADMIN — BUSPIRONE HYDROCHLORIDE 30 MG: 10 TABLET ORAL at 17:17

## 2023-10-25 RX ADMIN — FILGRASTIM-AAFI 300 MCG: 300 INJECTION, SOLUTION SUBCUTANEOUS at 09:26

## 2023-10-25 RX ADMIN — BUSPIRONE HYDROCHLORIDE 30 MG: 10 TABLET ORAL at 21:29

## 2023-10-25 RX ADMIN — FOLIC ACID 1 MG: 1 TABLET ORAL at 09:16

## 2023-10-25 RX ADMIN — AMLODIPINE BESYLATE 10 MG: 5 TABLET ORAL at 09:15

## 2023-10-25 RX ADMIN — OXYCODONE HYDROCHLORIDE 2.5 MG: 5 SOLUTION ORAL at 18:11

## 2023-10-25 RX ADMIN — GABAPENTIN 300 MG: 300 CAPSULE ORAL at 21:29

## 2023-10-25 RX ADMIN — IPRATROPIUM BROMIDE 0.5 MG: 0.5 SOLUTION RESPIRATORY (INHALATION) at 13:58

## 2023-10-25 RX ADMIN — FLUCONAZOLE 200 MG: 100 TABLET ORAL at 09:15

## 2023-10-25 RX ADMIN — LEVALBUTEROL HYDROCHLORIDE 1.25 MG: 1.25 SOLUTION RESPIRATORY (INHALATION) at 08:05

## 2023-10-25 RX ADMIN — BUSPIRONE HYDROCHLORIDE 30 MG: 10 TABLET ORAL at 09:16

## 2023-10-25 RX ADMIN — OXYCODONE HYDROCHLORIDE 5 MG: 5 SOLUTION ORAL at 21:28

## 2023-10-25 RX ADMIN — SERTRALINE 75 MG: 25 TABLET, FILM COATED ORAL at 09:16

## 2023-10-25 RX ADMIN — FAMOTIDINE 20 MG: 20 TABLET, FILM COATED ORAL at 17:17

## 2023-10-25 RX ADMIN — SULFAMETHOXAZOLE AND TRIMETHOPRIM 1 TABLET: 800; 160 TABLET ORAL at 09:16

## 2023-10-25 RX ADMIN — OXYCODONE HYDROCHLORIDE 2.5 MG: 5 SOLUTION ORAL at 09:29

## 2023-10-25 RX ADMIN — METOPROLOL TARTRATE 25 MG: 25 TABLET, FILM COATED ORAL at 21:29

## 2023-10-25 RX ADMIN — LEVOFLOXACIN 250 MG: 250 TABLET, FILM COATED ORAL at 09:15

## 2023-10-25 RX ADMIN — GABAPENTIN 300 MG: 300 CAPSULE ORAL at 09:15

## 2023-10-25 RX ADMIN — CHLORHEXIDINE GLUCONATE 15 ML: 1.2 SOLUTION ORAL at 09:17

## 2023-10-25 RX ADMIN — NICOTINE 7 MG: 7 PATCH, EXTENDED RELEASE TRANSDERMAL at 09:17

## 2023-10-25 RX ADMIN — IPRATROPIUM BROMIDE 0.5 MG: 0.5 SOLUTION RESPIRATORY (INHALATION) at 08:06

## 2023-10-25 RX ADMIN — OXYCODONE HYDROCHLORIDE 5 MG: 5 SOLUTION ORAL at 06:04

## 2023-10-25 RX ADMIN — GABAPENTIN 300 MG: 300 CAPSULE ORAL at 17:17

## 2023-10-25 RX ADMIN — OXYCODONE HYDROCHLORIDE 5 MG: 5 SOLUTION ORAL at 12:43

## 2023-10-25 RX ADMIN — ENOXAPARIN SODIUM 40 MG: 40 INJECTION SUBCUTANEOUS at 09:16

## 2023-10-25 RX ADMIN — CHLORHEXIDINE GLUCONATE 15 ML: 1.2 SOLUTION ORAL at 21:29

## 2023-10-25 RX ADMIN — IPRATROPIUM BROMIDE 0.5 MG: 0.5 SOLUTION RESPIRATORY (INHALATION) at 20:57

## 2023-10-25 RX ADMIN — ACYCLOVIR 400 MG: 200 CAPSULE ORAL at 09:17

## 2023-10-25 RX ADMIN — METOPROLOL TARTRATE 25 MG: 25 TABLET, FILM COATED ORAL at 09:15

## 2023-10-25 RX ADMIN — ACYCLOVIR 400 MG: 200 CAPSULE ORAL at 17:17

## 2023-10-25 NOTE — ASSESSMENT & PLAN NOTE
Wt Readings from Last 3 Encounters:   10/25/23 77.2 kg (170 lb 3.1 oz)   09/07/23 74.6 kg (164 lb 6.4 oz)   08/30/23 73.3 kg (161 lb 9.6 oz)     Lab Results   Component Value Date    LVEF 60 08/28/2023     (H) 09/29/2023     (H) 09/13/2023     Echo 8/28/23: LVEF 60%.        Plan:  K > 4   Measure I/O

## 2023-10-25 NOTE — ASSESSMENT & PLAN NOTE
Lab Results   Component Value Date    CREATININE 1.11 10/25/2023    CREATININE 1.04 10/24/2023    CREATININE 1.01 10/23/2023       Likely prerenal.  Second to poor oral intake versus poor urine output. Baseline creatinine 1 to 1.2.     Cr at baseline  Plan:  Urinary retention protocol, Daily weights, I/O  Trend Cr daily

## 2023-10-25 NOTE — ASSESSMENT & PLAN NOTE
Cuffless trach placed 9/17, transitioned to cuffed on 10/3. Oxygen requirements not improving. For mental health patient is on buspirone, quetiapine, and sertraline. For pain, gabapentin, oxycodone scheduled and prn, prn oxycodone mainly utilized for increased pain during chemo and after a bronch. On Guaifenesin, Atrovent and Xopenex for airway maintenance. No improvement in bilateral consolidation or atelectasis and groundglass opacities on repeat CT and chest x-rays. Patient not receiving proper positive pressure to open up atelectatic lung. Bronchial cx with 3 colonies CoNS. PCP PCR invalid, repeat negative. CTCAP indicates additional groundglass opacity with paraseptal emphysema, which may be contributing to inability to remain off vent. SBTs have been unsuccessful to place patient on trach collar since return to ICU. Patient tolerated high flow all day and overnight. Back on vent overnight, failed trach collar o/n. CXR 10/19 appears unchanged from 10/16 just not good breath during image, less of right lung visualized this time. Did well overnight on trach collar trial and today she is at 11 L. After 24 hours trial and patient not desaturating or become dyspneic, she was transferred to level 2 step down. She was desatting on HFNC and VBGs showed hypercarbia and hypoxia, due to which she was transferred to critical care. Plan:  Continue trach collar during the day and at night. Goal 350 TV, 12/6 pressure and PEEP 6. Maintain cuff overnight from 9 pm to 6 am for positive pressure, can open cuff during the day for patient to speak.

## 2023-10-25 NOTE — ASSESSMENT & PLAN NOTE
Lab Results   Component Value Date    EGFR 50 10/25/2023    EGFR 54 10/24/2023    EGFR 56 10/23/2023    CREATININE 1.11 10/25/2023    CREATININE 1.04 10/24/2023    CREATININE 1.01 10/23/2023     Baseline Cr between 0.75-1.1  Initial HERIBERTO felt 2/2 prerenal azotemia 2/2 diuresis, reduced PO intake +/- ATN. Patient received 2 doses of Bumex IV 2 g as she had not been responding appropriately to IV 40 Lasix daily. Creatinine went up by 0.5 meeting criteria for an HERIBERTO. This may be prerenal intrinsic or postrenal.  Potential prerenal causes include reduced kidney perfusion due to lisinopril. Intrinsic can also be lisinopril due to ATN, AIN from chemo medications, TLS, crystaline nephropathy from acyclovir, rituxan nephrotoxicity, filgrastim glomerulonephritis. Post renal obstructive could be from urine retention from vincristine or cyclophosphamide bladder fibrosis. Suspect most likely due to medications as a combination of prerenal and intrinsic. FENa was 0.2%, UA shows no casts or signs of infection, urine eosinophils 0%. Patient likely has prerenal HERIBERTO from volume depletion. Received 2 L NS and 1 pRBC and cr went up by 0.07 still and Na and Cl went down, suspect that volume prevented further cr rise and free water excretion impaired by kidney function, allowing hyponatremia to increase. Creatinine increase is likely plateaued and went down the following day. Suspect that now that nephrotoxic medications have been stopped she will continue to respond well to Lasix and creatinine will continue to downtrend. HERIBERTO now resolved. Will be cautious about nephrotoxins and monitor as restarting EPOCH and ppx. Patient gets volume depleted and overloaded very easily. Especially from chemo. 10/19 Heriberto as cr went up by 0.3 in 48 hours from 0.82 on 10/17 to 1.22 on 10/19. Suspect this is prerenal hypovolemic, will see if patient improves with fluids. She gets overloaded easily so if no improvement suspect CRS again.  Received 2L bolus NS. Creatinine elevated still. Plan: Continue to monitor UO/renal function.  Avoid nephrotoxic agents

## 2023-10-25 NOTE — ASSESSMENT & PLAN NOTE
Patient received 1 PRBC transfusion on 10/5. Hemoglobin trended 8> 7.0 > 7.3> 7.0> 6.8. No sites of bleeding, no black stools. Iron panel indicative of inflammatory anemia. Normal Vitamin B12 levels, negative hemolysis smear. Folate is low 5.5. Retic count <10K low. Plan:  Transfuse 1 unit PRBC leukoreduced and cross-reduced. Folate supplementation  Venofer infusion not indicated at this time. Trend hemoglobin and transfuse for <7.

## 2023-10-25 NOTE — ASSESSMENT & PLAN NOTE
Secondary to malignancy and chemotherapy  Hgb 6.8 today, WBC 3.66, platelets Ok at 422  Will follow counts closely and transfuse as needed

## 2023-10-25 NOTE — PROGRESS NOTES
[address] [facility]  Progress Note  Name: Piedad Gomez  MRN: [id number]  Unit/Bed#: ICU 03 I Date of Admission: 9/13/2023   Date of Service: 10/25/2023 I Hospital Day: 43    Assessment/Plan   * Acute respiratory failure with hypoxia secondary to oropharyngeal mass  Assessment & Plan  POA with O2 saturate around 80% on room air. Needed high flow 60 L to bring up her O2 saturation to 95%. Recent hospitalization for respiratory failure second to pneumonia. CTA PE study negative for PE but had RML PNA. Persistent partial atelectasis of the lower lobes noted. Prior to admission was a current daily smoker. Patient had an event of desaturation last night (10/24) and was placed on HFNC. She may require overnight ventilation via the trache. Will continue to assess. Discussed with the CC team this am (10/25) and patient will remain in ICU bed 3 under SD level of care for now. Should her respiratory status decompensate, CC will assume primary care at that time. Pt saturation is in the 90s on 70% FIO2 10L of oxygen via trache collar  PEG tube in place   Patient oxygen was brought up by suctioning, however patient has been refusing regular suctioning of the trachea.   tracheostomy was placed 9/17. Tracheal cuff in place  Completed Decadron. Atrovent/Xopenex  Airway clearance protocol. IS. Continue 3% saline nebs     Large cell lymphoma ([address])  Assessment & Plan  Biopsy on 9/17: Large B-cell lymphoma, germinal center B-cell type, c-MYC and BCL-2 double expression. Ki-67 proliferation index is up to 90%; FISH & NGS pending. Staging work-up: Currently stage II. First inpatient chemotherapy 9/22/2023- with R-EPOCH. Patient started on filgrastim  WBC levels are at 3.66 today  On prophyalxis with diflucan, acycolvir, and levaquin     Inpatient hematology oncology following. Recommendation appreciated. Outpatient follow-up with hematology oncology.   Close TLS workup- uric acid, electrolytes  Allopurinol as prophylaxis  Neupogen  PEG tube in place    Oropharyngeal mass  Assessment & Plan  9/14 Neck/ soft tissue CT: Large enhancing soft tissue mass identified within the left neck involving multiple spaces. This appears to be centered within the left oropharynx with extensions as described above into multiple spaces. This results in significant airway compromise. 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 Small level 3 and level 4 nodes are seen within the neck. Tracheostomy placed by ENT on 9/17/23  H/o tobacco abuse, daily smoker. .  Preliminary biopsy: Large B-cell lymphoma, germinal center B-cell type, c-MYC and BCL-2 double expression. First chemotherapy was on 9/22. Heme onc following        CKD (chronic kidney disease) stage 3, GFR 30-59 ml/min [facility])  Assessment & Plan  Lab Results   Component Value Date    EGFR 56 10/23/2023    EGFR 68 10/22/2023    EGFR 58 10/21/2023    CREATININE 1.01 10/23/2023    CREATININE 0.86 10/22/2023    CREATININE 0.98 10/21/2023   Stable at baseline,   Avoid nephrotoxic medications and fluctuations in blood pressure    Pancytopenia ([address])  Assessment & Plan  Secondary to malignancy and chemotherapy  Hgb 6.8 today, WBC 3.66, platelets Ok at 793  Will follow counts closely and transfuse as needed     Depression  Assessment & Plan  This is a new diagnosis for the patient. She hates the food understandably and is not sleeping well. Support provided today   Continue buspar, seroquel and zoloft  Continue to work on a discharge plan as transitioning to the next phase of recovery will help her mental state     Generalized abdominal pain  Assessment & Plan  Pain started after PEG placement   CT scan of the A/P done on 10/13- results reviewed. No obvious pathology to explain pain in the left side of her abdomen but as of today (10/25) the pain is no longer present     Pain syndrome, chronic  Assessment & Plan  Home regimen: Oxycodone 5 mg twice daily as needed.  Tylenol 650 mg every 8 hours as needed. Gabapentin 600 mg 3 times daily. Medical marijuana. Per daughter, patient was overusing Tylenol over-the-counter. Pain mostly from lumbar radiculopathy. Impaired ambulatory dysfunction second to pain. Continue gabapentin 300mg 3 times daily, scheduled oxycodone 5 mg 3 times daily, as needed Tylenol 650 mg every 6 hours. There is additional oxycodone to take should she have breakthrough pain     (HFpEF) heart failure with preserved ejection fraction [facility])  Assessment & Plan  Wt Readings from Last 3 Encounters:   10/23/23 76.9 kg (169 lb 8.5 oz)   09/07/23 74.6 kg (164 lb 6.4 oz)   08/30/23 73.3 kg (161 lb 9.6 oz)     Volume status: Euvolemic. POA physical (limited): JVD-, HJR-, B/L LE trace to 1+ edema. Echo 8/28/23: LVEF 60%. D1DD. CXR 9/22: Persistent pulmonary venous congestion with small effusions and bibasilar atelectasis. IV lasix given with even I/O. Repeat CXR on 10/19 shows persistent groudglass opacities likely pulmonary edema and trace effusions        Benign essential hypertension  Assessment & Plan  Well controlled at home. Home meds: Amlodipine 10 mg daily, Coreg 25 mg twice daily, lisinopril 10 mg daily. Elevated BP may second to anxiety from chemotherapy/new diagnosis lymphoma, chronic pain. Inpatient BP regimen includes:  Metoprolol 25 mg Q12  Amlodipine 10 mg daily. PRN hydralazine if BP >160. BP is acceptable today (10/25)     Blister (nonthermal) of left forearm, sequela  Assessment & Plan  9/17/23: Blisters of LUE noted, no signs of infection  Daily wound care, monitor signs of infection- healing noted     Ambulatory dysfunction  Assessment & Plan  PT OT evaluation recommend postacute rehab  Fall precautions  Encourage OOB    Angioedema  Assessment & Plan  POA with acute respiratory failure, SOB. On physical in [facility] ED: Swollen tongue, swelling soft and hard palate and almost the head of all phalanx is completely closed. Severe angioedema.   Decadron 10 mg, Benadryl 25 mg given. Initially refused but eventually agreed with intubation. Prior episode of angioedema in 2020 noted. Suspected coadministration of increase the dose of tramadol and lisinopril. Per daughter, there is no recent change in medications except Trelegy inhaler, cefdinir. Etiology: more likely 2/2 oropharyngeal mass compression. Plan:  Branda Hews placed 9/17  Tolerating trach, No issue  PEG tube in place    Hyperlipidemia-resolved as of 10/2/2023  Assessment & Plan  Lab Results   Component Value Date     CHOLESTEROL 203 (H) 01/24/2023     CHOLESTEROL 177 08/11/2022     CHOLESTEROL 184 07/08/2020            Lab Results   Component Value Date     HDL 45 (L) 01/24/2023     HDL 37 (L) 08/11/2022     HDL 44 (L) 07/08/2020            Lab Results   Component Value Date     TRIG 166 (H) 09/16/2023     TRIG 160 (H) 01/24/2023     TRIG 108 08/11/2022            Lab Results   Component Value Date     NONHDLC 146 07/12/2018     [address] (H) 04/29/2013      Continue outpatient diet/ lifestyle modification                 VTE Pharmacologic Prophylaxis: VTE Score: 7 High Risk (Score >/= 5) - Pharmacological DVT Prophylaxis Ordered: enoxaparin (Lovenox). Sequential Compression Devices Ordered. Patient Centered Rounds: I performed bedside rounds with nursing staff today. Discussions with Specialists or Other Care Team Provider: CC team and primary RN    Education and Discussions with Family / Patient: Attempted to update  (daughter) via phone. Unable to contact. Total Time Spent on Date of Encounter in care of patient: 48 mins. This time was spent on one or more of the following: performing physical exam; counseling and coordination of care; obtaining or reviewing history; documenting in the medical record; reviewing/ordering tests, medications or procedures; communicating with other healthcare professionals and discussing with patient's family/caregivers.     Current Length of Stay: 42 day(s)  Current Patient Status: Inpatient   Certification Statement: The patient will continue to require additional inpatient hospital stay due to mass causing difficulty breathing resulting in a trache and peg. Discharge Plan: Anticipate discharge in >72 hrs to unknown at this time, home with around the clock nursing vs rehab    Code Status: Level 1 - Full Code    Subjective:   Very tired and not interested in talking this morning. Had a terrible night with her oxygen. No specific pain to report. Objective:     Vitals:   Temp (24hrs), Av.8 °F (37.1 °C), Min:98.4 °F (36.9 °C), Max:99.5 °F (37.5 °C)    Temp:  [98.4 °F (36.9 °C)-99.5 °F (37.5 °C)] 98.4 °F (36.9 °C)  HR:  [] 96  Resp:  [20-35] 20  BP: (109-124)/(52-66) 113/52  SpO2:  [81 %-100 %] 94 %  Body mass index is 32.55 kg/m². Input and Output Summary (last 24 hours): Intake/Output Summary (Last 24 hours) at 10/25/2023 1019  Last data filed at 10/25/2023 0800  Gross per 24 hour   Intake 950 ml   Output 1075 ml   Net -125 ml       Physical Exam:   Physical Exam  Constitutional:       Comments: Sitting in bed upright    Neck:      Comments: Trache collar   Cardiovascular:      Rate and Rhythm: Normal rate and regular rhythm. Pulmonary:      Effort: Pulmonary effort is normal.   Abdominal:      General: Abdomen is flat. Comments: PEG intact for Tube Feedings    Musculoskeletal:         General: No swelling. Normal range of motion. Skin:     General: Skin is warm and dry. Neurological:      General: No focal deficit present. Mental Status: She is alert and oriented to person, place, and time.    Psychiatric:         Mood and Affect: Mood normal.         Behavior: Behavior normal.            Additional Data:     Labs:  Results from last 7 days   Lab Units 10/25/23  0504 10/23/23  1800 10/23/23  0403 10/22/23  0513 10/21/23  0506   WBC Thousand/uL 3.66*   < > 3.54*   < > 16.24*   HEMOGLOBIN g/dL 6.8*   < > 7.0*   < > 7.9* HEMATOCRIT % 22.1*   < > 22.4*   < > 25.1*   PLATELETS Thousands/uL 312   < > 413*   < > 598*   BANDS PCT %  --   --  4   < >  --    NEUTROS PCT %  --   --   --   --  88*   LYMPHS PCT %  --   --   --   --  3*   LYMPHO PCT %  --   --  16   < >  --    MONOS PCT %  --   --   --   --  1*   MONO PCT %  --   --  1*   < >  --    EOS PCT %  --   --  1   < > 1    < > = values in this interval not displayed.      Results from last 7 days   Lab Units 10/25/23  0504 10/24/23  0746   SODIUM mmol/L 135 137   POTASSIUM mmol/L 5.2 5.0   CHLORIDE mmol/L 101 102   CO2 mmol/L 31 31   BUN mg/dL 28* 29*   CREATININE mg/dL 1.11 1.04   ANION GAP mmol/L 3 4   CALCIUM mg/dL 9.4 9.4   ALBUMIN g/dL  --  3.0*   TOTAL BILIRUBIN mg/dL  --  0.24   ALK PHOS U/L  --  85   ALT U/L  --  20   AST U/L  --  17   GLUCOSE RANDOM mg/dL 104 126                 Results from last 7 days   Lab Units 10/22/23  0513   PROCALCITONIN ng/ml 0.25       Lines/Drains:  Invasive Devices       Central Venous Catheter Line  Duration             CVC Central Lines 10/19/23 Double Right Internal jugular 5 days              Drain  Duration             Gastrostomy/Enterostomy Percutaneous Endoscopic Gastrostomy (PEG) 16 Fr. LUQ 27 days              Airway  Duration             Surgical Airway Shiley Cuffed 38 days                    Central Line:  Goal for removal: N/A - Discharging with PICC for IV ABX/medications             Imaging: Reviewed radiology reports from this admission including: chest xray, chest CT scan, and abdominal/pelvic CT    Recent Cultures (last 7 days):         Last 24 Hours Medication List:   Current Facility-Administered Medications   Medication Dose Route Frequency Provider Last Rate    acetaminophen  650 mg Oral Q6H PRN Trent Spencer MD      acyclovir  400 mg Oral BID Rachell Covarrubias MD      alteplase  2 mg Intracatheter Q1MIN PRN Rachell Covarrubias MD      alteplase  2 mg Intracatheter Q1MIN PRN Rachell Covarrubias MD      amLODIPine 10 mg Oral Daily Junito Jay MD      busPIRone  30 mg Oral TID Hardik Ledesma MD      chlorhexidine  15 mL Mouth/Throat Q12H [facility] Trent Spencer MD      enoxaparin  40 mg Subcutaneous Q24H [facility] Sera Ann MD      famotidine  20 mg Oral BID Hardik Ledesma MD      Filgrastim-aafi  300 mcg Subcutaneous Daily Sulema Darby MD      fluconazole  200 mg Oral Daily Sulema Darby MD      folic acid  1 mg Oral Daily Oracio G Paul      gabapentin  300 mg Oral TID Hardik Ledesma MD      guaiFENesin  200 mg Oral Q6H PRN Di Meeir MD      hydrALAZINE  5 mg Intravenous Q6H PRN Junito Jay MD      levalbuterol  1.25 mg Nebulization TID Mariah Devi MD      And    ipratropium  0.5 mg Nebulization TID Mariah Devi MD      levofloxacin  250 mg Oral Q24H Victorina Varghese MD      melatonin  3 mg Oral HS RANDEE Stock      metoprolol tartrate  25 mg Oral Q12H Jared Wheeler MD      nicotine  7 mg Transdermal Daily Dank Diallo MD      ondansetron  4 mg Intravenous Q8H PRN Trent Spencer MD      ondansetron  4 mg Intravenous Q8H PRN Sulema Darby MD      oxyCODONE  2.5 mg Oral Q6H PRN Junito Jay MD      oxyCODONE  5 mg Oral Q8H Sera Ann MD      polyethylene glycol  17 g Oral Daily PRN Hardik Ledesma MD      QUEtiapine  50 mg Oral HS RANDEE Ellis      senna  8.8 mg Per NG Tube HS Trent Spencer MD      sertraline  75 mg Per PEG Tube Daily RANDEE Ellis      sodium chloride  4 mL Nebulization Q6H PRN Oracio Hurley      sulfamethoxazole-trimethoprim  1 tablet Oral Once per day on Mon Wed Fri Sulema Darby MD          Today, Patient Was Seen By: RANDEE Cha Chi    **Please Note: This note may have been constructed using a voice recognition system. **

## 2023-10-25 NOTE — ASSESSMENT & PLAN NOTE
This is a new diagnosis for the patient. She hates the food understandably and is not sleeping well.  Support provided today   Continue buspar, seroquel and zoloft  Continue to work on a discharge plan as transitioning to the next phase of recovery will help her mental state

## 2023-10-25 NOTE — ASSESSMENT & PLAN NOTE
9/14 Neck/ soft tissue CT: Large enhancing soft tissue mass identified within the left neck involving multiple spaces. Centered within the left oropharynx with extensions as described above into multiple spaces. This results in significant airway compromise. 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 Small level 3/level 4 nodes are seen within the neck. Tracheostomy by ENT. Replaced on 9/26. H/o tobacco abuse, daily smoker. On nicotine while inpatient, confirmed with patient she needs nicotine patch. CT soft tissue neck shows reduced mass effect from 9/14 to 10/13. Can consider reducing trach size if continues to improve    Plan:  ENT and heme onc following  Will titrate NRT weekly  As per speech therapist recommendations she will continue puree foods. Not ready to advance diet. Continue PEG tube feeds for nutrition.    See acute respiratory failure and large B cell lymphoma above

## 2023-10-25 NOTE — PROGRESS NOTES
[address]  Acceptance  Note  Name: Ayde Sorensen  MRN: [id number]  Unit/Bed#: ICU 03 I Date of Admission: 9/13/2023   Date of Service: 10/25/2023 I Hospital Day: 43    Assessment/Plan   * Acute respiratory failure with hypoxia secondary to oropharyngeal mass  Assessment & Plan  Cuffless trach placed 9/17, transitioned to cuffed on 10/3. Oxygen requirements not improving. For mental health patient is on buspirone, quetiapine, and sertraline. For pain, gabapentin, oxycodone scheduled and prn, prn oxycodone mainly utilized for increased pain during chemo and after a bronch. On Guaifenesin, Atrovent and Xopenex for airway maintenance. No improvement in bilateral consolidation or atelectasis and groundglass opacities on repeat CT and chest x-rays. Patient not receiving proper positive pressure to open up atelectatic lung. Bronchial cx with 3 colonies CoNS. PCP PCR invalid, repeat negative. CTCAP indicates additional groundglass opacity with paraseptal emphysema, which may be contributing to inability to remain off vent. SBTs have been unsuccessful to place patient on trach collar since return to ICU. Patient tolerated high flow all day and overnight. Back on vent overnight, failed trach collar o/n. CXR 10/19 appears unchanged from 10/16 just not good breath during image, less of right lung visualized this time. Did well overnight on trach collar trial and today she is at 11 L. After 24 hours trial and patient not desaturating or become dyspneic, she was transferred to level 2 step down. She was desatting on HFNC and VBGs showed hypercarbia and hypoxia, due to which she was transferred to critical care. Plan:  Continue trach collar during the day and at night. Goal 350 TV, 12/6 pressure and PEEP 6. Maintain cuff overnight from 9 pm to 6 am for positive pressure, can open cuff during the day for patient to speak.     Large cell lymphoma ([address])  Assessment & Plan  Large B-cell lymphoma, stage II. First chemo cycle 9/22 4 days of R-EPOCH. 9/27 Rituxan. 9/28 Filgrastim. Acyclovir, Zyloprim, Diflucan, Levaquin, Zofran, Bactrim for chemo prophylaxis, all discontinued with heme-onc's approval as Nafisa reached 4200. I suspect leukocytosis is secondary to the filgrastim. Restarted all ppx 10/13 for [facility] cycle 2 which will run for the next 4 days. Ppx and filgrastim can be discontinued when patient is no longer neutropenic after this cycle again. Platelets have been up trending to 696, which did not happen during cycle 1. This could simply be reactive from inflammation, but could also occur from vincristine which is part of current regimen in combination with the residual effect from filgastrim. Likely just reactive but if continues to rise consider checking blood smear, ferritin, ESR, CRP, Jak2, CALR, MPL, BCR-ABL1. Glucose is increasing on past 2 morning draws, appears to be 2/2 prednisolone from regimen. PICC came out with 3 hours chemo remaining. Tunneled line in R IJ placed yesterday and received cytoxan. Today benadryl with rituxan. Neutropenic precautions will be needed in a few days    Plan:  Transfusing 1 unit PRBC. Keep Hgb>7 and Plt>20 for chemo   Continue rituximab infusions as per heme/onc plan. Receiving daily filgrastim. See acute respiratory failure above    Oropharyngeal mass  Assessment & Plan  9/14 Neck/ soft tissue CT: Large enhancing soft tissue mass identified within the left neck involving multiple spaces. Centered within the left oropharynx with extensions as described above into multiple spaces. This results in significant airway compromise. 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 Small level 3/level 4 nodes are seen within the neck. Tracheostomy by ENT. Replaced on 9/26. H/o tobacco abuse, daily smoker. On nicotine while inpatient, confirmed with patient she needs nicotine patch. CT soft tissue neck shows reduced mass effect from 9/14 to 10/13.  Can consider reducing trach size if continues to improve    Plan:  ENT and heme onc following  Will titrate NRT weekly  As per speech therapist recommendations she will continue puree foods. Not ready to advance diet. Continue PEG tube feeds for nutrition. See acute respiratory failure and large B cell lymphoma above      Pancytopenia ([address])  Assessment & Plan  Patient received 1 PRBC transfusion on 10/5. Hemoglobin trended 8> 7.0 > 7.3> 7.0> 6.8. No sites of bleeding, no black stools. Iron panel indicative of inflammatory anemia. Normal Vitamin B12 levels, negative hemolysis smear. Folate is low 5.5. Retic count <10K low. Plan:  Transfuse 1 unit PRBC leukoreduced and cross-reduced. Folate supplementation  Venofer infusion not indicated at this time. Trend hemoglobin and transfuse for <7. RML pneumonia-resolved as of 9/22/2023  Assessment & Plan  9/13 CT PE study: Patchy consolidation right middle lobe, new from 8/25/2023, compatible with pneumonia. No leukocytosis, no fever/chills. Positive for sore throat, cough. New onset pneumonia after recent hospitalization and antibiotics treatment. 9/14: Bronchoscopy/ ABL. MRSA negative. ABL revealed 2+ Growth of Candida albicans, suspected contaminant. PO (acute kidney injury) (HCC)-resolved as of 9/21/2023  Assessment & Plan  Lab Results   Component Value Date    CREATININE 1.11 10/25/2023    CREATININE 1.04 10/24/2023    CREATININE 1.01 10/23/2023       Likely prerenal.  Second to poor oral intake versus poor urine output. Baseline creatinine 1 to 1.2.     Cr at baseline  Plan:  Urinary retention protocol, Daily weights, I/O  Trend Cr daily    (HFpEF) heart failure with preserved ejection fraction [facility])  Assessment & Plan  Wt Readings from Last 3 Encounters:   10/25/23 77.2 kg (170 lb 3.1 oz)   09/07/23 74.6 kg (164 lb 6.4 oz)   08/30/23 73.3 kg (161 lb 9.6 oz)     Lab Results   Component Value Date    LVEF 60 08/28/2023     (H) 09/29/2023     (H) 09/13/2023     Echo 8/28/23: LVEF 60%. Plan:  K > 4   Measure I/O      Angioedema  Assessment & Plan  POA in [place] ([place]) ED: Swollen tongue, soft and hard palate with severe angioedema. Decadron 10 mg, Benadryl 25 mg given. 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 Plan:  Cuffless trach 9/17, cuffed Shiley XLT #7 10/3  See acute respiratory failure and oropharyngeal mass above  Continue to wean vent. Ambulatory dysfunction  Assessment & Plan  2/2 Impacted by chronic pain syndrome + prolonged hospital stay. Continue OOB with PT. PT rec post acute rehab however reportedly Cannot get LTACH placement while getting chemo per CM  She is aware STR SNFs continue to follow and treatment can be done from a SNF level of care. PT would need to be on trach collar for at least 48 hours with no need for the vent. Aware that pt would need to be around 28% FiO2     Pain syndrome, chronic  Assessment & Plan  Home regimen: Oxycodone 5 mg BID, Tylenol 650 mg q8 prn. Gabapentin 600 mg TID. Medical marijuana. Lumbar radiculopathy and impaired ambulatory dysfunction secondary to pain. Has bowel regimen and is having bowel movements daily. Patient required additional pain management during previous cycle and has some additional pain from tunneled line placement    Plan:  Continue gabapentin 300 mg TID, oxycodone 5 mg TID, prn Tylenol 650 mg q6. Oxycodone 2.5 mg prn can d/c a day after tunneled line replaced    Benign essential hypertension  Assessment & Plan  Home regimen Coreg 12.5 mg BID, Amlodipine 10 mg daily, and lisinopril 40 mg. All discontinued at this time secondary to hypotension and PO since cycle 1. but stable currently without any antihypertensives. Systolic persistently elevated and tachycardic, potentially secondary to pain. Patient was more hypotensive during last chemo.  Coreg contraindicated during chemo, since cycles are every other week, would be better to stick with lopressor during duration of therapy as opposed to switching constantly. Plan:  Monitor vitals. Continue Norvasc 10 and lopressor 25 bid  Prn hydralazine for SBP > 160    Hyperlipidemia-resolved as of 10/2/2023  Assessment & Plan  Lab Results   Component Value Date    CHOLESTEROL 118 10/09/2023    CHOLESTEROL 203 (H) 01/24/2023    CHOLESTEROL 177 08/11/2022     Lab Results   Component Value Date    HDL 14 (L) 10/09/2023    HDL 45 (L) 01/24/2023    HDL 37 (L) 08/11/2022     Lab Results   Component Value Date    TRIG 144 10/09/2023    TRIG 166 (H) 09/16/2023    TRIG 160 (H) 01/24/2023     Lab Results   Component Value Date    NONHDLC 104 10/09/2023    [address] 146 07/12/2018    [address] 207 (H) 04/29/2013     Continue outpatient diet and lifestyle modification. Depression  Assessment & Plan  Patient on Zoloft 75 daily and Seroquel hs  Patient is depressed, multiple family/palliative discussions. patient is firm that she wants to live and to fight, she is just tired. Currently goal is disease treatment oriented. Full code. No SI/HI ideations. Palliative signed off, will reconsult if patient changes her mind regarding wishes. On 10/20/23 she decided to leave A. Now waiting for CM and machine     Generalized abdominal pain  Assessment & Plan  Patient reports abdominal pain which is unchanged since 9/22 no guarding on exam. Takes Famotidine for GERD at home. Alk phos 10/2 145, 10/20 79. CTCAP reveals complex right upper pole renal lesion requiring possible outpatient MRI    - Continue Famotidine  - On bowel regimen with Senna and Miralax prn    Chronic Superficial thrombophlebitis  Assessment & Plan  Right forearm soreness. RUE US: No acute or chronic deep vein thrombosis. 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  PO not severe enough to require renal dosing at this time, will continue to monitor and adjust dosing as needed.      -Continue DVT ppx with Lovenox 40    Blister (nonthermal) of left forearm, sequela  Assessment & Plan  Blisters of left upper extremity healing, no signs of infection, continue daily wound care. CKD (chronic kidney disease) stage 3, GFR 30-59 ml/min [facility])  Assessment & Plan  Lab Results   Component Value Date    EGFR 50 10/25/2023    EGFR 54 10/24/2023    EGFR 56 10/23/2023    CREATININE 1.11 10/25/2023    CREATININE 1.04 10/24/2023    CREATININE 1.01 10/23/2023     Baseline Cr between 0.75-1.1  Initial HERIBERTO felt 2/2 prerenal azotemia 2/2 diuresis, reduced PO intake +/- ATN. Patient received 2 doses of Bumex IV 2 g as she had not been responding appropriately to IV 40 Lasix daily. Creatinine went up by 0.5 meeting criteria for an HERIBERTO. This may be prerenal intrinsic or postrenal.  Potential prerenal causes include reduced kidney perfusion due to lisinopril. Intrinsic can also be lisinopril due to ATN, AIN from chemo medications, TLS, crystaline nephropathy from acyclovir, rituxan nephrotoxicity, filgrastim glomerulonephritis. Post renal obstructive could be from urine retention from vincristine or cyclophosphamide bladder fibrosis. Suspect most likely due to medications as a combination of prerenal and intrinsic. FENa was 0.2%, UA shows no casts or signs of infection, urine eosinophils 0%. Patient likely has prerenal HERIBERTO from volume depletion. Received 2 L NS and 1 pRBC and cr went up by 0.07 still and Na and Cl went down, suspect that volume prevented further cr rise and free water excretion impaired by kidney function, allowing hyponatremia to increase. Creatinine increase is likely plateaued and went down the following day. Suspect that now that nephrotoxic medications have been stopped she will continue to respond well to Lasix and creatinine will continue to downtrend. HERIBERTO now resolved. Will be cautious about nephrotoxins and monitor as restarting EPOCH and ppx. Patient gets volume depleted and overloaded very easily. Especially from chemo.      10/19 Heriberto as cr went up by 0.3 in 48 hours from 0.82 on 10/17 to 1.22 on 10/19. Suspect this is prerenal hypovolemic, will see if patient improves with fluids. She gets overloaded easily so if no improvement suspect CRS again. Received 2L bolus NS. Creatinine elevated still. Plan: Continue to monitor UO/renal function. Avoid nephrotoxic agents    COPD without exacerbation (HCC)-resolved as of 9/26/2023  Assessment & Plan  Continue vent with nebs. Wean off vent. Encephalopathy-resolved as of 9/21/2023  Assessment & Plan  9/19- Pt appeared to be AAO x3. Improving. ICU Core Measures     Vented Patient  VAP Bundle  VAP bundle ordered     A: Assess, Prevent, and Manage Pain Has pain been assessed? Yes  Need for changes to pain regimen? No   B: Both Spontaneous Awakening Trials (SATs) and Spontaneous Breathing Trials (SBTs) Plan to perform spontaneous awakening trial today? N/A    C: Choice of Sedation RASS Goal: 0 Alert and Calm  Need for changes to sedation or analgesia regimen? NA   D: Delirium CAM-ICU: Negative   E: Early Mobility  Plan for early mobility? Yes   F: Family Engagement Plan for family engagement today? No           Review of Invasive Devices:      Central access plan: Medications requiring central line. Will be discontinued upon discharge. Prophylaxis:  VTE VTE covered by:  enoxaparin, Subcutaneous, 40 mg at 10/25/23 0813       Stress Ulcer  covered byfamotidine (PEPCID) tablet 20 mg [[id number]]       Subjective   HPI/24hr events: Patient status changed from stepdown level 2 to critical care since she was tachypnic, she has been desatting on 60L/min high flow nasal cannula.         Objective                            Vitals I/O      Most Recent Min/Max in 24hrs   Temp 98.5 °F (36.9 °C) Temp  Min: 98.4 °F (36.9 °C)  Max: 99.5 °F (37.5 °C)   Pulse 102 Pulse  Min: 87  Max: 120   Resp (!) 10 Resp  Min: 10  Max: 35   /54 BP  Min: 107/54  Max: 124/64   O2 Sat 92 % SpO2  Min: 81 %  Max: 97 % Intake/Output Summary (Last 24 hours) at 10/25/2023 1651  Last data filed at 10/25/2023 1200  Gross per 24 hour   Intake 684 ml   Output 475 ml   Net 209 ml         Diet Enteral/Parenteral; Tube Feeding with Oral Diet; Two Telly HN; Continuous; 38; 190; Water; Every 4 hours; Dysphagia/Modified Consistency; Dysphagia 1-Pureed; Honey Thick Liquid; Dysphagia 1-Pureed, Honey Thick Liquid     Invasive Monitoring Physical exam    Physical Exam  Eyes:      Pupils: Pupils are equal, round, and reactive to light. HENT:      Head: Normocephalic and atraumatic. Nose: No congestion. Neck:      Comments: Trach collar  Cardiovascular:      Rate and Rhythm: Normal rate and regular rhythm. Abdominal:      General: There is no distension. Comments: PEG intact for Tube Feedings    Constitutional:       Comments: Sitting in bed upright   Pulmonary:      Effort: Bradypnea present. Neurological:      General: No focal deficit present. Mental Status: She is alert and oriented to person, place and time.            Diagnostic Studies      EKG: N/A  Imaging: N/A     Medications:  Scheduled PRN   acyclovir, 400 mg, BID  amLODIPine, 10 mg, Daily  busPIRone, 30 mg, TID  chlorhexidine, 15 mL, Q12H ASHLEY  enoxaparin, 40 mg, Q24H ASHLEY  famotidine, 20 mg, BID  Filgrastim-aafi, 300 mcg, Daily  fluconazole, 346 mg, Daily  folic acid, 1 mg, Daily  gabapentin, 300 mg, TID  levalbuterol, 1.25 mg, TID   And  ipratropium, 0.5 mg, TID  levofloxacin, 250 mg, Q24H ASHLEY  melatonin, 3 mg, HS  metoprolol tartrate, 25 mg, Q12H [facility]  nicotine, 7 mg, Daily  oxyCODONE, 5 mg, Q8H  QUEtiapine, 50 mg, HS  senna, 8.8 mg, HS  sertraline, 75 mg, Daily  sulfamethoxazole-trimethoprim, 1 tablet, Once per day on Mon Wed Fri      acetaminophen, 650 mg, Q6H PRN  alteplase, 2 mg, Q1MIN PRN  alteplase, 2 mg, Q1MIN PRN  guaiFENesin, 200 mg, Q6H PRN  hydrALAZINE, 5 mg, Q6H PRN  ondansetron, 4 mg, Q8H PRN  ondansetron, 4 mg, Q8H PRN  oxyCODONE, 2.5 mg, Q6H PRN  polyethylene glycol, 17 g, Daily PRN  sodium chloride, 4 mL, Q6H PRN       Continuous          Labs:    CBC    Recent Labs     10/24/23  0746 10/25/23  0504   WBC 2.80* 3.66*   HGB 7.0* 6.8*   HCT 22.2* 22.1*    312     BMP    Recent Labs     10/24/23  0746 10/25/23  0504   SODIUM 137 135   K 5.0 5.2    101   CO2 31 31   AGAP 4 3   BUN 29* 28*   CREATININE 1.04 1.11   CALCIUM 9.4 9.4       Coags    No recent results     Additional Electrolytes  Recent Labs     10/24/23  0746   MG 2.1   PHOS 4.0          Blood Gas    Recent Labs     10/24/23  1127   PHART 7.385   YUC1OJY 49.5*   PO2ART 126.5   JDW5KRN 29.0*   BEART 3.5   SOURCE Radial, Right     Recent Labs     10/24/23  1127 10/25/23  1240   PHVEN  --  7.378   WLU2QDY  --  55.2*   PO2VEN  --  23.9*   APE8FBQ  --  31.8*   BEVEN  --  5.7   SOURCE Radial, Right  --     LFTs  Recent Labs     10/24/23  0746   ALT 20   AST 17   ALKPHOS 85   ALB 3.0*   TBILI 0.24       Infectious  No recent results  Glucose  Recent Labs     10/24/23  0746 10/25/23  0504   GLUC 126 104               Jose Alfredo Sosa MD

## 2023-10-25 NOTE — CASE MANAGEMENT
Case Management Progress Note    Patient name James Olmstead  Location ICU 03/ICU 03 MRN [id number]  : 1955 Date 10/25/2023       LOS (days): 42  Geometric Mean LOS (GMLOS) (days): 26.10  Days to GMLOS:-15.9        OBJECTIVE:        Current admission status: Inpatient  Preferred Pharmacy:   [facility]/pharmacy #[id number]- [place] - [address]. [address] [facility] [address]  Phone: [phone] Fax: [phone] [facility] [place] [address] [place] [address]  Phone: [phone] Fax: [phone]    Primary Care Provider: Katelyn Noyola MD    Primary Insurance: Daljit Nails REP  Secondary Insurance: [address] [facility]    PROGRESS NOTE:      CM met with pt at bedside this morning. She reports her daughter went home yesterday due to not feeling well so they plan to discuss the facilities at length today. Pt aware CM will stop by tomorrow to see what they decided. Pt also aware that dc will likely not happen until next week pending facility and being able to manage off of VENT and HiFlow NC. Pt understanding.

## 2023-10-25 NOTE — ASSESSMENT & PLAN NOTE
Large B-cell lymphoma, stage II. First chemo cycle 9/22 4 days of R-EPOCH. 9/27 Rituxan. 9/28 Filgrastim. Acyclovir, Zyloprim, Diflucan, Levaquin, Zofran, Bactrim for chemo prophylaxis, all discontinued with heme-onc's approval as Nafisa reached 4200. I suspect leukocytosis is secondary to the filgrastim. Restarted all ppx 10/13 for [facility] cycle 2 which will run for the next 4 days. Ppx and filgrastim can be discontinued when patient is no longer neutropenic after this cycle again. Platelets have been up trending to 696, which did not happen during cycle 1. This could simply be reactive from inflammation, but could also occur from vincristine which is part of current regimen in combination with the residual effect from filgastrim. Likely just reactive but if continues to rise consider checking blood smear, ferritin, ESR, CRP, Jak2, CALR, MPL, BCR-ABL1. Glucose is increasing on past 2 morning draws, appears to be 2/2 prednisolone from regimen. PICC came out with 3 hours chemo remaining. Tunneled line in R IJ placed yesterday and received cytoxan. Today benadryl with rituxan. Neutropenic precautions will be needed in a few days    Plan:  Transfusing 1 unit PRBC. Keep Hgb>7 and Plt>20 for chemo   Continue rituximab infusions as per heme/onc plan. Receiving daily filgrastim.   See acute respiratory failure above

## 2023-10-25 NOTE — ASSESSMENT & PLAN NOTE
POA in [place] ([place]) ED: Swollen tongue, soft and hard palate with severe angioedema. Decadron 10 mg, Benadryl 25 mg given. 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 Plan:  Cuffless trach 9/17, cuffed Shiley XLT #7 10/3  See acute respiratory failure and oropharyngeal mass above  Continue to wean vent.

## 2023-10-25 NOTE — RESPIRATORY THERAPY NOTE
Resp care   10/25/23 9941   Respiratory Assessment   Resp Comments Pt taken off HFNC and placed on 70% TC per MD. Speaking valve in place. Tolerating well. RT will cont to monitor.    Oxygen Therapy/Pulse Ox   O2 Device Trach mask   O2 Therapy Oxygen humidified   FiO2 (%) 70   O2 Flow Rate (L/min) 10 L/min

## 2023-10-25 NOTE — ASSESSMENT & PLAN NOTE
Lab Results   Component Value Date    CHOLESTEROL 118 10/09/2023    CHOLESTEROL 203 (H) 01/24/2023    CHOLESTEROL 177 08/11/2022     Lab Results   Component Value Date    HDL 14 (L) 10/09/2023    HDL 45 (L) 01/24/2023    HDL 37 (L) 08/11/2022     Lab Results   Component Value Date    TRIG 144 10/09/2023    TRIG 166 (H) 09/16/2023    TRIG 160 (H) 01/24/2023     Lab Results   Component Value Date    NONHDLC 104 10/09/2023    [address] 146 07/12/2018    [address] 207 (H) 04/29/2013     Continue outpatient diet and lifestyle modification.

## 2023-10-26 LAB
ABO GROUP BLD BPU: NORMAL
ANION GAP SERPL CALCULATED.3IONS-SCNC: 6 MMOL/L
ANISOCYTOSIS BLD QL SMEAR: PRESENT
BASE EX.OXY STD BLDV CALC-SCNC: 60.2 % (ref 60–80)
BASE EXCESS BLDV CALC-SCNC: 4.6 MMOL/L
BASOPHILS # BLD MANUAL: 0 THOUSAND/UL (ref 0–0.1)
BASOPHILS NFR MAR MANUAL: 0 % (ref 0–1)
BPU ID: NORMAL
BUN SERPL-MCNC: 26 MG/DL (ref 5–25)
CALCIUM SERPL-MCNC: 9.7 MG/DL (ref 8.4–10.2)
CHLORIDE SERPL-SCNC: 102 MMOL/L (ref 96–108)
CO2 SERPL-SCNC: 30 MMOL/L (ref 21–32)
CREAT SERPL-MCNC: 1.11 MG/DL (ref 0.6–1.3)
CROSSMATCH: NORMAL
EOSINOPHIL # BLD MANUAL: 0.08 THOUSAND/UL (ref 0–0.4)
EOSINOPHIL NFR BLD MANUAL: 1 % (ref 0–6)
ERYTHROCYTE [DISTWIDTH] IN BLOOD BY AUTOMATED COUNT: 15.4 % (ref 11.6–15.1)
ERYTHROCYTE [DISTWIDTH] IN BLOOD BY AUTOMATED COUNT: 15.4 % (ref 11.6–15.1)
GFR SERPL CREATININE-BSD FRML MDRD: 50 ML/MIN/1.73SQ M
GLUCOSE SERPL-MCNC: 125 MG/DL (ref 65–140)
HCO3 BLDV-SCNC: 30.9 MMOL/L (ref 24–30)
HCT VFR BLD AUTO: 26.8 % (ref 34.8–46.1)
HCT VFR BLD AUTO: 26.8 % (ref 34.8–46.1)
HGB BLD-MCNC: 8.4 G/DL (ref 11.5–15.4)
HGB BLD-MCNC: 8.4 G/DL (ref 11.5–15.4)
LG PLATELETS BLD QL SMEAR: PRESENT
LYMPHOCYTES # BLD AUTO: 0.72 THOUSAND/UL (ref 0.6–4.47)
LYMPHOCYTES # BLD AUTO: 8 % (ref 14–44)
MAGNESIUM SERPL-MCNC: 2.1 MG/DL (ref 1.9–2.7)
MCH RBC QN AUTO: 30.5 PG (ref 26.8–34.3)
MCH RBC QN AUTO: 30.5 PG (ref 26.8–34.3)
MCHC RBC AUTO-ENTMCNC: 31.3 G/DL (ref 31.4–37.4)
MCHC RBC AUTO-ENTMCNC: 31.3 G/DL (ref 31.4–37.4)
MCV RBC AUTO: 98 FL (ref 82–98)
MCV RBC AUTO: 98 FL (ref 82–98)
METAMYELOCYTES NFR BLD MANUAL: 3 % (ref 0–1)
MONOCYTES # BLD AUTO: 2.49 THOUSAND/UL (ref 0–1.22)
MONOCYTES NFR BLD: 31 % (ref 4–12)
MYELOCYTES NFR BLD MANUAL: 5 % (ref 0–1)
NEUTROPHILS # BLD MANUAL: 4.09 THOUSAND/UL (ref 1.85–7.62)
NEUTS BAND NFR BLD MANUAL: 23 % (ref 0–8)
NEUTS SEG NFR BLD AUTO: 28 % (ref 43–75)
NRBC BLD AUTO-RTO: 3 /100 WBC (ref 0–2)
O2 CT BLDV-SCNC: 8.1 ML/DL
PCO2 BLDV: 55.6 MM HG (ref 42–50)
PH BLDV: 7.36 [PH] (ref 7.3–7.4)
PHOSPHATE SERPL-MCNC: 3.3 MG/DL (ref 2.3–4.1)
PLATELET # BLD AUTO: 255 THOUSANDS/UL (ref 149–390)
PLATELET # BLD AUTO: 255 THOUSANDS/UL (ref 149–390)
PLATELET BLD QL SMEAR: ADEQUATE
PMV BLD AUTO: 9.8 FL (ref 8.9–12.7)
PMV BLD AUTO: 9.8 FL (ref 8.9–12.7)
PO2 BLDV: 31.6 MM HG (ref 35–45)
POIKILOCYTOSIS BLD QL SMEAR: PRESENT
POLYCHROMASIA BLD QL SMEAR: PRESENT
POTASSIUM SERPL-SCNC: 5 MMOL/L (ref 3.5–5.3)
RBC # BLD AUTO: 2.75 MILLION/UL (ref 3.81–5.12)
RBC # BLD AUTO: 2.75 MILLION/UL (ref 3.81–5.12)
RBC MORPH BLD: PRESENT
SODIUM SERPL-SCNC: 138 MMOL/L (ref 135–147)
UNIT DISPENSE STATUS: NORMAL
UNIT PRODUCT CODE: NORMAL
UNIT PRODUCT VOLUME: 350 ML
UNIT RH: NORMAL
VARIANT LYMPHS # BLD AUTO: 1 %
WBC # BLD AUTO: 8.02 THOUSAND/UL (ref 4.31–10.16)
WBC # BLD AUTO: 8.02 THOUSAND/UL (ref 4.31–10.16)

## 2023-10-26 PROCEDURE — 83735 ASSAY OF MAGNESIUM: CPT | Performed by: NURSE PRACTITIONER

## 2023-10-26 PROCEDURE — 94640 AIRWAY INHALATION TREATMENT: CPT

## 2023-10-26 PROCEDURE — 99232 SBSQ HOSP IP/OBS MODERATE 35: CPT | Performed by: INTERNAL MEDICINE

## 2023-10-26 PROCEDURE — 97110 THERAPEUTIC EXERCISES: CPT

## 2023-10-26 PROCEDURE — 85027 COMPLETE CBC AUTOMATED: CPT | Performed by: NURSE PRACTITIONER

## 2023-10-26 PROCEDURE — 80048 BASIC METABOLIC PNL TOTAL CA: CPT | Performed by: NURSE PRACTITIONER

## 2023-10-26 PROCEDURE — 94760 N-INVAS EAR/PLS OXIMETRY 1: CPT

## 2023-10-26 PROCEDURE — 82805 BLOOD GASES W/O2 SATURATION: CPT | Performed by: NURSE PRACTITIONER

## 2023-10-26 PROCEDURE — 84100 ASSAY OF PHOSPHORUS: CPT | Performed by: NURSE PRACTITIONER

## 2023-10-26 PROCEDURE — 85007 BL SMEAR W/DIFF WBC COUNT: CPT

## 2023-10-26 RX ADMIN — BUSPIRONE HYDROCHLORIDE 30 MG: 10 TABLET ORAL at 16:58

## 2023-10-26 RX ADMIN — IPRATROPIUM BROMIDE 0.5 MG: 0.5 SOLUTION RESPIRATORY (INHALATION) at 07:50

## 2023-10-26 RX ADMIN — METOPROLOL TARTRATE 25 MG: 25 TABLET, FILM COATED ORAL at 20:22

## 2023-10-26 RX ADMIN — GABAPENTIN 300 MG: 300 CAPSULE ORAL at 09:24

## 2023-10-26 RX ADMIN — MELATONIN 3 MG: at 22:10

## 2023-10-26 RX ADMIN — OXYCODONE HYDROCHLORIDE 5 MG: 5 SOLUTION ORAL at 12:04

## 2023-10-26 RX ADMIN — OXYCODONE HYDROCHLORIDE 5 MG: 5 SOLUTION ORAL at 20:21

## 2023-10-26 RX ADMIN — NICOTINE 7 MG: 7 PATCH, EXTENDED RELEASE TRANSDERMAL at 09:25

## 2023-10-26 RX ADMIN — GABAPENTIN 300 MG: 300 CAPSULE ORAL at 20:21

## 2023-10-26 RX ADMIN — FAMOTIDINE 20 MG: 20 TABLET, FILM COATED ORAL at 17:00

## 2023-10-26 RX ADMIN — BUSPIRONE HYDROCHLORIDE 30 MG: 10 TABLET ORAL at 20:22

## 2023-10-26 RX ADMIN — QUETIAPINE FUMARATE 50 MG: 25 TABLET ORAL at 22:10

## 2023-10-26 RX ADMIN — FILGRASTIM-AAFI 300 MCG: 300 INJECTION, SOLUTION SUBCUTANEOUS at 12:02

## 2023-10-26 RX ADMIN — FAMOTIDINE 20 MG: 20 TABLET, FILM COATED ORAL at 09:24

## 2023-10-26 RX ADMIN — ACYCLOVIR 400 MG: 200 CAPSULE ORAL at 17:00

## 2023-10-26 RX ADMIN — LEVALBUTEROL HYDROCHLORIDE 1.25 MG: 1.25 SOLUTION RESPIRATORY (INHALATION) at 07:50

## 2023-10-26 RX ADMIN — SERTRALINE 75 MG: 25 TABLET, FILM COATED ORAL at 09:24

## 2023-10-26 RX ADMIN — LEVOFLOXACIN 250 MG: 250 TABLET, FILM COATED ORAL at 09:43

## 2023-10-26 RX ADMIN — GABAPENTIN 300 MG: 300 CAPSULE ORAL at 16:59

## 2023-10-26 RX ADMIN — METOPROLOL TARTRATE 25 MG: 25 TABLET, FILM COATED ORAL at 09:29

## 2023-10-26 RX ADMIN — CHLORHEXIDINE GLUCONATE 15 ML: 1.2 SOLUTION ORAL at 09:24

## 2023-10-26 RX ADMIN — LEVALBUTEROL HYDROCHLORIDE 1.25 MG: 1.25 SOLUTION RESPIRATORY (INHALATION) at 19:44

## 2023-10-26 RX ADMIN — AMLODIPINE BESYLATE 10 MG: 5 TABLET ORAL at 09:29

## 2023-10-26 RX ADMIN — ACYCLOVIR 400 MG: 200 CAPSULE ORAL at 09:44

## 2023-10-26 RX ADMIN — IPRATROPIUM BROMIDE 0.5 MG: 0.5 SOLUTION RESPIRATORY (INHALATION) at 19:44

## 2023-10-26 RX ADMIN — BUSPIRONE HYDROCHLORIDE 30 MG: 10 TABLET ORAL at 09:25

## 2023-10-26 RX ADMIN — FOLIC ACID 1 MG: 1 TABLET ORAL at 09:24

## 2023-10-26 RX ADMIN — ENOXAPARIN SODIUM 40 MG: 40 INJECTION SUBCUTANEOUS at 09:24

## 2023-10-26 RX ADMIN — FLUCONAZOLE 200 MG: 100 TABLET ORAL at 09:43

## 2023-10-26 RX ADMIN — IPRATROPIUM BROMIDE 0.5 MG: 0.5 SOLUTION RESPIRATORY (INHALATION) at 13:58

## 2023-10-26 RX ADMIN — CHLORHEXIDINE GLUCONATE 15 ML: 1.2 SOLUTION ORAL at 20:22

## 2023-10-26 RX ADMIN — OXYCODONE HYDROCHLORIDE 5 MG: 5 SOLUTION ORAL at 03:44

## 2023-10-26 RX ADMIN — LEVALBUTEROL HYDROCHLORIDE 1.25 MG: 1.25 SOLUTION RESPIRATORY (INHALATION) at 13:58

## 2023-10-26 NOTE — ASSESSMENT & PLAN NOTE
Lab Results   Component Value Date    CHOLESTEROL 118 10/09/2023    CHOLESTEROL 203 (H) 01/24/2023    CHOLESTEROL 177 08/11/2022     Lab Results   Component Value Date    HDL 14 (L) 10/09/2023    HDL 45 (L) 01/24/2023    HDL 37 (L) 08/11/2022     Lab Results   Component Value Date    TRIG 144 10/09/2023    TRIG 166 (H) 09/16/2023    TRIG 160 (H) 01/24/2023     Lab Results   Component Value Date    NONHDLC 104 10/09/2023    [address] 146 07/12/2018    [address] 207 (H) 04/29/2013     Continue outpatient diet and lifestyle modification.

## 2023-10-26 NOTE — ASSESSMENT & PLAN NOTE
POA in [place] ([place]) ED: Swollen tongue, soft and hard palate with severe angioedema. Decadron 10 mg, Benadryl 25 mg given. 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 Plan:  Cuffless trach 9/17, cuffed Shiley XLT #7 10/3  See acute respiratory failure and oropharyngeal mass above  Continue to wean vent.

## 2023-10-26 NOTE — ASSESSMENT & PLAN NOTE
Wt Readings from Last 3 Encounters:   10/26/23 69 kg (152 lb 1.9 oz)   09/07/23 74.6 kg (164 lb 6.4 oz)   08/30/23 73.3 kg (161 lb 9.6 oz)     Lab Results   Component Value Date    LVEF 60 08/28/2023     (H) 09/29/2023     (H) 09/13/2023     Echo 8/28/23: LVEF 60%.        Plan:  K > 4   Measure I/O

## 2023-10-26 NOTE — ASSESSMENT & PLAN NOTE
Right forearm soreness. RUE US: No acute or chronic deep vein thrombosis. 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 PO not severe enough to require renal dosing at this time, will continue to monitor and adjust dosing as needed.       Continue DVT ppx with Lovenox 40

## 2023-10-26 NOTE — RESPIRATORY THERAPY NOTE
10/25/23 2300   Respiratory Assessment   Resp Comments called stat to room due to pt desatting to 60's per rn. pt refusing to let rn suction. NP notified. pt allowed RT to suction. inner cannula changed .  will continue to montior   Cough Description   Sputum Amount Small   Sputum Color White   Sputum Consistency Thin   Sputum How Obtained Tracheal   Surgical Airway Federico Cuffed   Placement Date/Time: 09/17/23 0848   Tube Size: 7 mm  Placed By: Physician  Placed by (Name): Dr. Eddie Cheema  Type: Tracheostomy  Brand: Federico  Style: (c) Cuffed  Comments: [address] Changed/new

## 2023-10-26 NOTE — ASSESSMENT & PLAN NOTE
Patient reports abdominal pain which is unchanged since 9/22 no guarding on exam. Takes Famotidine for GERD at home. Alk phos 10/2 145, 10/20 79.  CTCAP reveals complex right upper pole renal lesion requiring possible outpatient MRI    Continue Famotidine  On bowel regimen with Senna and Miralax prn

## 2023-10-26 NOTE — PROGRESS NOTES
Progress Note - Medical Oncology:  Daya Harrison 77 y.o. female MRN: [id number]  Unit/Bed#: ICU 03 Encounter: [id number]    Assessment and Plan:  1. 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 Patient remains on granulocyte colony-stimulating factor support, and appropriate prophylaxis. Will recommend close outpatient follow-up with Medical Oncology on October 30th. Patient and Critical Care are in agreement. Note: See note on discharge planning below. 1. Continue Filgrastim 300 mcg Subcutaneous Daily. A. Please obtain CBC-D daily while inpatient. 2. Recommend outpatient follow-up with Medical Oncology on October 30th. Note: Patient is presently awaiting placement at a skilled nursing facility versus long-term care facility. Given that she is electing to pursue continued systemic-treatment, it is of utmost importance that the facility will permit and can arrange for transportation to outpatient Medical Oncology appointments, for inpatient admission every 3-weeks for administration of EPOCH, and for outpatient Rituximab infusions. This was discussed with Critical Care; who will communicate patient's needs to Case Management. 2. Acute on Subacute Normocytic Anemia with Intermittent Transfusion Requirement:  Of note, patient has a subacute history of normocytic anemia dating back to September 2023. From September 2015 through August 2023, baseline hemoglobin was within the 13.0-15.6 gm/dL range. Hemoglobin now varies from 7.0-9.3 gm/dL; with a normal mean corpuscular volume, and elevated red cell distribution width.  Ancillary-studies, as follows: Iron Panel: Iron: 17 Ferritin: 519 Percent Saturation: 10% TIBC: 165 Vitamin B12: 761 Folate: 5.5. Reticulocyte Index: 0.1. Patient has no evidence of obvious gastrointestinal, or genitourinary bleeding. She is not on oral antiplatelet therapy, or chronic anticoagulation (Note: She is presently on Lovenox 40 mg Daily for VTE-prophylaxis). Highly-suspect acute on subacute anemia requiring blood product transfusion, is secondary to chemotherapy-induced myelosuppression superimposed on a background of anemia of chronic inflammation related to malignancy, with an additional component of folic acid deficiency. Would recommend supplementation with Folic Acid 1 mg Daily. Continue to transfuse at hemoglobin less than 7.0 gm/dL (Leukoreduced, and irradiated blood products only). Monitor counts daily. Patient is in agreement. 1. Supplement with Folic Acid 1 mg Daily. 2. Transfuse at Hemoglobin less than 7.0 gm/dL. A. Note: Please transfuse irradiated, and leukoreduced blood products. Subjective: Interval Events: Patient was seen and examined in the Intensive Care Unit. She was resting comfortably in bed. No acute events noted overnight. Patient was minimally participatory on my evaluation. Her eyes were closed throughout our encounter; and she would intermittently nod in response to questioning. No complaints or concerns were expressed to me. Objective:  Vital Signs:  Vitals:    10/26/23 1100   BP: 136/67   Pulse: 97   Resp: (!) 28   Temp: 98.7 °F (37.1 °C)   SpO2: 99%     Physical Exam:   General: Alert, and oriented; Chronically-Ill Appearing  HEENT: Atraumatic, and normocephalic; PERRLA; EOMI; Moist mucosal membranes  Neck: Trachea midline; No neck masses, thyromegaly, or cervical lymphadenopathy present on my exam  Cardiovascular: Regular rate and rhythm; No murmurs, rubs, or gallops appreciated; No peripheral edema  Respiratory: Diminished Breathsounds; Tracheostomy in Place  Abdomen: Soft, non-tender; Non-distended; No organomegaly appreciated;  Bowel sounds present in 4-quadrants; Left Upper Quadrant PEG-Tube  Extremities: No obvious deformities; No peripheral edema; Moves all  Neurologic: Appropriately alert, and oriented to Person, Place, and Time; No focal neurologic deficits    Lab, Imaging and other studies:     Patient was seen and discussed with attending physician, Marguerite Morocho M.D.    Jo Ha D.O.   Hematology-Oncology Fellow (PGY-4)

## 2023-10-26 NOTE — ASSESSMENT & PLAN NOTE
Patient received 1 PRBC transfusion on 10/5. Hemoglobin 8.4 s/p 1 PRBC transfusion on 10/25. B/L is 12-14. No sites of bleeding, no black stools. Iron panel indicative of inflammatory anemia. Normal Vitamin B12 levels, negative hemolysis smear. Folate is low 5.5. Plan:  Trend hemoglobin and transfuse for <7. Trend platelets  Folate supplementation  As per my conversation with Heme/oncology attending, patient is expected to have chemotherapy associated pancytopenia. No further anemia w/u required.

## 2023-10-26 NOTE — NURSING NOTE
Pt repeatedly refusing to be suctioned despite desatting into the 70s. RN/CC AP educated pt on the necessity/indications of being suctioned. Pt eventually allowed RT to suction pt/change inner cannula. O2 sats have since improved.

## 2023-10-26 NOTE — PROGRESS NOTES
[address] [facility]  Progress Note  Name: Ayla Pittman  MRN: [id number]  Unit/Bed#: ICU 03 I Date of Admission: 9/13/2023   Date of Service: 10/26/2023 I Hospital Day: 37    Assessment/Plan   * Acute respiratory failure with hypoxia secondary to oropharyngeal mass  Assessment & Plan  Cuffless trach placed 9/17, transitioned to cuffed on 10/3. Oxygen requirements not improving. For mental health patient is on buspirone, quetiapine, and sertraline. For pain, gabapentin, oxycodone scheduled and prn, prn oxycodone mainly utilized for increased pain during chemo and after a bronch. On Guaifenesin, Atrovent and Xopenex for airway maintenance. No improvement in bilateral consolidation or atelectasis and groundglass opacities on repeat CT and chest x-rays. Patient not receiving proper positive pressure to open up atelectatic lung. Bronchial cx with 3 colonies CoNS. PCP PCR invalid, repeat negative. CTCAP indicates additional groundglass opacity with paraseptal emphysema, which may be contributing to inability to remain off vent. SBTs have been unsuccessful to place patient on trach collar since return to ICU. Patient tolerated high flow all day and overnight. Back on vent overnight, failed trach collar o/n. CXR 10/19 appears unchanged from 10/16 just not good breath during image, less of right lung visualized this time. Did well overnight on trach collar trial and today she is at 11 L. After 24 hours trial and patient not desaturating or become dyspneic, she was transferred to level 2 step down. She was desatting on HFNC and VBGs showed hypercarbia and hypoxia, due to which she was transferred to critical care.     Recent Labs     10/24/23  1127 10/25/23  1240 10/26/23  0357   PHART 7.385  --   --    CTF4ONH 49.5*  --   --    PO2ART 126.5  --   --    GHG6FNF 29.0*  --   --    PHVEN  --    < > 7.363   RHX2ZTL  --    < > 55.6*   PO2VEN  --    < > 31.6*   FEV8LFF  --    < > 30.9*   BEVEN  --    < > 4.6 SOURCE Radial, Right  --   --     < > = values in this interval not displayed. Plan:  Continue trach collar during the day and at night. Goal 350 TV, 12/6 pressure and PEEP 6. Maintain cuff overnight from 9 pm to 6 am for positive pressure, can open cuff during the day for patient to speak. Discussed with CM regarding placement to a place from where she can get to her chemotherapy infusions. Patient is deciding between two placements. Large cell lymphoma ([address])  Assessment & Plan  Large B-cell lymphoma, stage II. First chemo cycle 9/22 4 days of R-EPOCH. 9/27 Rituxan. 9/28 Filgrastim. Acyclovir, Zyloprim, Diflucan, Levaquin, Zofran, Bactrim for chemo prophylaxis, all discontinued with heme-onc's approval as Nafisa reached 4200. As per heme/onc attending pancytopenia is expected with her chemotherapy. Restarted all ppx 10/13 for [facility] cycle 2, which was done over 4 days. Ppx and filgrastim can be discontinued when patient is no longer neutropenic after this cycle again. Tunneled line in R IJ in place. She received cytoxan on 10/19. Neutropenic precautions will be needed in a few days    Plan:  Transfuse blood products as needed. Keep Hgb>7 and Plt>20 for chemo   Continue rituximab infusions as per heme/onc plan. Receiving daily filgrastim. See acute respiratory failure above    Oropharyngeal mass  Assessment & Plan  9/14 Neck/ soft tissue CT: Large enhancing soft tissue mass identified within the left neck involving multiple spaces. Centered within the left oropharynx with extensions as described above into multiple spaces. This results in significant airway compromise. 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 Small level 3/level 4 nodes are seen within the neck. Tracheostomy by ENT. Replaced on 9/26. H/o tobacco abuse, daily smoker. On nicotine while inpatient, confirmed with patient she needs nicotine patch. CT soft tissue neck shows reduced mass effect from 9/14 to 10/13.  Can consider reducing trach size if continues to improve    Plan:  ENT and heme onc following  Will titrate NRT weekly  As per speech therapist recommendations she will continue puree foods. Not ready to advance diet. Continue PEG tube feeds for nutrition. See acute respiratory failure and large B cell lymphoma above      Pancytopenia ([address])  Assessment & Plan  Patient received 1 PRBC transfusion on 10/5. Hemoglobin 8.4 s/p 1 PRBC transfusion on 10/25. B/L is 12-14. No sites of bleeding, no black stools. Iron panel indicative of inflammatory anemia. Normal Vitamin B12 levels, negative hemolysis smear. Folate is low 5.5. Plan:  Trend hemoglobin and transfuse for <7. Trend platelets  Folate supplementation  As per my conversation with Heme/oncology attending, patient is expected to have chemotherapy associated pancytopenia. No further anemia w/u required. RML pneumonia-resolved as of 9/22/2023  Assessment & Plan  9/13 CT PE study: Patchy consolidation right middle lobe, new from 8/25/2023, compatible with pneumonia. No leukocytosis, no fever/chills. Positive for sore throat, cough. New onset pneumonia after recent hospitalization and antibiotics treatment. 9/14: Bronchoscopy/ ABL. MRSA negative. ABL revealed 2+ Growth of Candida albicans, suspected contaminant. PO (acute kidney injury) (HCC)-resolved as of 9/21/2023  Assessment & Plan  Lab Results   Component Value Date    CREATININE 1.11 10/26/2023    CREATININE 1.11 10/25/2023    CREATININE 1.04 10/24/2023       Likely prerenal.  Second to poor oral intake versus poor urine output. Baseline creatinine 1 to 1.2.     Cr at baseline  Plan:  Urinary retention protocol, Daily weights, I/O  Trend Cr daily    (HFpEF) heart failure with preserved ejection fraction [facility])  Assessment & Plan  Wt Readings from Last 3 Encounters:   10/26/23 69 kg (152 lb 1.9 oz)   09/07/23 74.6 kg (164 lb 6.4 oz)   08/30/23 73.3 kg (161 lb 9.6 oz)     Lab Results   Component Value Date LVEF 60 08/28/2023     (H) 09/29/2023     (H) 09/13/2023     Echo 8/28/23: LVEF 60%. Plan:  K > 4   Measure I/O      Angioedema  Assessment & Plan  POA in [place] ([place] ED: Swollen tongue, soft and hard palate with severe angioedema. Decadron 10 mg, Benadryl 25 mg given. 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 Plan:  Cuffless trach 9/17, cuffed Shiley XLT #7 10/3  See acute respiratory failure and oropharyngeal mass above  Continue to wean vent. Ambulatory dysfunction  Assessment & Plan  2/2 Impacted by chronic pain syndrome + prolonged hospital stay. Continue OOB with PT. PT rec post acute rehab however reportedly Cannot get LTACH placement while getting chemo per CM  She is aware STR SNFs continue to follow and treatment can be done from a SNF level of care. PT would need to be on trach collar for at least 48 hours with no need for the vent. Aware that pt would need to be around 28% FiO2     Pain syndrome, chronic  Assessment & Plan  Home regimen: Oxycodone 5 mg BID, Tylenol 650 mg q8 prn. Gabapentin 600 mg TID. Medical marijuana. Lumbar radiculopathy and impaired ambulatory dysfunction secondary to pain. Has bowel regimen and is having bowel movements daily. Patient required additional pain management during previous cycle and has some additional pain from tunneled line placement    Plan:  Continue gabapentin 300 mg TID, oxycodone 5 mg TID, prn Tylenol 650 mg q6. Oxycodone 2.5 mg prn can d/c a day after tunneled line replaced    Benign essential hypertension  Assessment & Plan  Home regimen Coreg 12.5 mg BID, Amlodipine 10 mg daily, and lisinopril 40 mg. All discontinued at this time secondary to hypotension and PO since cycle 1. but stable currently without any antihypertensives. Systolic persistently elevated and tachycardic, potentially secondary to pain. Patient was more hypotensive during last chemo.  Coreg contraindicated during chemo, since cycles are every other week, would be better to stick with lopressor during duration of therapy as opposed to switching constantly. Plan:  Monitor vitals. Continue Norvasc 10 and lopressor 25 bid  Prn hydralazine for SBP > 160    Hyperlipidemia-resolved as of 10/2/2023  Assessment & Plan  Lab Results   Component Value Date    CHOLESTEROL 118 10/09/2023    CHOLESTEROL 203 (H) 01/24/2023    CHOLESTEROL 177 08/11/2022     Lab Results   Component Value Date    HDL 14 (L) 10/09/2023    HDL 45 (L) 01/24/2023    HDL 37 (L) 08/11/2022     Lab Results   Component Value Date    TRIG 144 10/09/2023    TRIG 166 (H) 09/16/2023    TRIG 160 (H) 01/24/2023     Lab Results   Component Value Date    NONHDLC 104 10/09/2023    [address] 146 07/12/2018    [address] 207 (H) 04/29/2013     Continue outpatient diet and lifestyle modification. Depression  Assessment & Plan  Patient on Zoloft 75 daily and Seroquel hs  Patient is depressed, multiple family/palliative discussions. patient is firm that she wants to live and to fight, she is just tired. Currently goal is disease treatment oriented. Full code. No SI/HI ideations. Palliative signed off, will reconsult if patient changes her mind regarding wishes. On 10/20/23 she decided to leave [place]. Now waiting for CM and machine     Generalized abdominal pain  Assessment & Plan  Patient reports abdominal pain which is unchanged since 9/22 no guarding on exam. Takes Famotidine for GERD at home. Alk phos 10/2 145, 10/20 79. CTCAP reveals complex right upper pole renal lesion requiring possible outpatient MRI    Continue Famotidine  On bowel regimen with Senna and Miralax prn    Chronic Superficial thrombophlebitis  Assessment & Plan  Right forearm soreness. RUE US: No acute or chronic deep vein thrombosis. 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 PO not severe enough to require renal dosing at this time, will continue to monitor and adjust dosing as needed.       Continue DVT ppx with Lovenox 40    Blister (nonthermal) of left forearm, sequela  Assessment & Plan  Blisters of left upper extremity healing, no signs of infection, continue daily wound care. CKD (chronic kidney disease) stage 3, GFR 30-59 ml/min [facility])  Assessment & Plan  Lab Results   Component Value Date    EGFR 50 10/26/2023    EGFR 50 10/25/2023    EGFR 54 10/24/2023    CREATININE 1.11 10/26/2023    CREATININE 1.11 10/25/2023    CREATININE 1.04 10/24/2023     Baseline Cr between 0.75-1.1  Initial PO felt 2/2 prerenal azotemia 2/2 diuresis, reduced PO intake +/- ATN. Patient received 2 doses of Bumex IV 2 g as she had not been responding appropriately to IV 40 Lasix daily. Creatinine went up by 0.5 meeting criteria for an PO. This may be prerenal intrinsic or postrenal.  Potential prerenal causes include reduced kidney perfusion due to lisinopril. Intrinsic can also be lisinopril due to ATN, AIN from chemo medications, TLS, crystaline nephropathy from acyclovir, rituxan nephrotoxicity, filgrastim glomerulonephritis. Post renal obstructive could be from urine retention from vincristine or cyclophosphamide bladder fibrosis. Suspect most likely due to medications as a combination of prerenal and intrinsic. FENa was 0.2%, UA shows no casts or signs of infection, urine eosinophils 0%. Patient likely has prerenal PO from volume depletion. Received 2 L NS and 1 pRBC and cr went up by 0.07 still and Na and Cl went down, suspect that volume prevented further cr rise and free water excretion impaired by kidney function, allowing hyponatremia to increase. Creatinine increase is likely plateaued and went down the following day. Suspect that now that nephrotoxic medications have been stopped she will continue to respond well to Lasix and creatinine will continue to downtrend. PO now resolved. Will be cautious about nephrotoxins and monitor as restarting EPOCH and ppx. Patient gets volume depleted and overloaded very easily. Especially from chemo. 10/19 Heriberto as cr went up by 0.3 in 48 hours from 0.82 on 10/17 to 1.22 on 10/19. Suspect this is prerenal hypovolemic, will see if patient improves with fluids. She gets overloaded easily so if no improvement suspect CRS again. Received 2L bolus NS. Creatinine elevated still. Plan: Continue to monitor UO/renal function. Avoid nephrotoxic agents    COPD without exacerbation (HCC)-resolved as of 9/26/2023  Assessment & Plan  Continue vent with nebs. Wean off vent. Encephalopathy-resolved as of 9/21/2023  Assessment & Plan  9/19- Pt appeared to be AAO x3. Improving. ICU Core Measures     Vented Patient  VAP Bundle  VAP bundle ordered     A: Assess, Prevent, and Manage Pain Has pain been assessed? Yes  Need for changes to pain regimen? No   B: Both Spontaneous Awakening Trials (SATs) and Spontaneous Breathing Trials (SBTs) Plan to perform spontaneous awakening trial today? N/A      C: Choice of Sedation RASS Goal: 0 Alert and Calm  Need for changes to sedation or analgesia regimen? No   D: Delirium CAM-ICU: Negative   E: Early Mobility  Plan for early mobility? Yes   F: Family Engagement Plan for family engagement today? Yes           Review of Invasive Devices:      Central access plan: Medications requiring central line      Prophylaxis:  VTE VTE covered by:  enoxaparin, Subcutaneous, 40 mg at 10/26/23 8583       Stress Ulcer  covered byfamotidine (PEPCID) tablet 20 mg [[id number]]       Subjective   HPI/24hr events: Did not require to be placed on vent overnight. Review of Systems   Constitutional:  Negative for chills and fever. HENT:  Negative for ear pain and sore throat. Eyes:  Negative for pain and visual disturbance. Respiratory:  Negative for cough and shortness of breath. Cardiovascular:  Negative for chest pain and palpitations. Gastrointestinal:  Positive for abdominal pain. Negative for vomiting.    Genitourinary:  Negative for dysuria and hematuria. Musculoskeletal:  Negative for arthralgias and back pain. Skin:  Negative for color change and rash. Neurological:  Negative for seizures and syncope. All other systems reviewed and are negative. Objective                            Vitals I/O      Most Recent Min/Max in 24hrs   Temp 98.7 °F (37.1 °C) Temp  Min: 98.4 °F (36.9 °C)  Max: 99.3 °F (37.4 °C)   Pulse 97 Pulse  Min: 87  Max: 109   Resp (!) 28 Resp  Min: 10  Max: 40   /67 BP  Min: 107/57  Max: 154/71   O2 Sat 99 % SpO2  Min: 90 %  Max: 100 %      Intake/Output Summary (Last 24 hours) at 10/26/2023 1145  Last data filed at 10/26/2023 1001  Gross per 24 hour   Intake 1831 ml   Output 1850 ml   Net -19 ml         Diet Enteral/Parenteral; Tube Feeding with Oral Diet; Two Telly HN; Continuous; 38; 190; Water; Every 4 hours; Dysphagia/Modified Consistency; Dysphagia 1-Pureed; Honey Thick Liquid; Dysphagia 1-Pureed, Honey Thick Liquid     Invasive Monitoring Physical exam    Physical Exam  Eyes:      Pupils: Pupils are equal, round, and reactive to light. HENT:      Head: Normocephalic and atraumatic. Nose: No congestion. Neck:      Comments: Trach collar  Cardiovascular:      Rate and Rhythm: Normal rate and regular rhythm. Abdominal:      General: There is no distension. Comments: PEG intact for Tube Feedings    Constitutional:       Comments: Sitting in bed upright   Pulmonary:      Effort: Pulmonary effort is normal.      Breath sounds: Normal breath sounds. Neurological:      General: No focal deficit present. Mental Status: She is alert and oriented to person, place and time.             Diagnostic Studies      EKG: N/A  Imaging: N/A     Medications:  Scheduled PRN   acyclovir, 400 mg, BID  amLODIPine, 10 mg, Daily  busPIRone, 30 mg, TID  chlorhexidine, 15 mL, Q12H ASHLEY  enoxaparin, 40 mg, Q24H ASHLEY  famotidine, 20 mg, BID  Filgrastim-aafi, 300 mcg, Daily  fluconazole, 461 mg, Daily  folic acid, 1 mg, Daily  gabapentin, 300 mg, TID  levalbuterol, 1.25 mg, TID   And  ipratropium, 0.5 mg, TID  levofloxacin, 250 mg, Q24H ASHLEY  melatonin, 3 mg, HS  metoprolol tartrate, 25 mg, Q12H [address]  nicotine, 7 mg, Daily  oxyCODONE, 5 mg, Q8H  QUEtiapine, 50 mg, HS  senna, 8.8 mg, HS  sertraline, 75 mg, Daily  sulfamethoxazole-trimethoprim, 1 tablet, Once per day on Mon Wed Fri      acetaminophen, 650 mg, Q6H PRN  alteplase, 2 mg, Q1MIN PRN  alteplase, 2 mg, Q1MIN PRN  guaiFENesin, 200 mg, Q6H PRN  hydrALAZINE, 5 mg, Q6H PRN  ondansetron, 4 mg, Q8H PRN  ondansetron, 4 mg, Q8H PRN  oxyCODONE, 2.5 mg, Q6H PRN  polyethylene glycol, 17 g, Daily PRN  sodium chloride, 4 mL, Q6H PRN       Continuous          Labs:    CBC    Recent Labs     10/25/23  0504 10/25/23  2140 10/26/23  0357   WBC 3.66*  --  8.02  8.02   HGB 6.8* 8.3* 8.4*  8.4*   HCT 22.1* 26.7* 26.8*  26.8*     --  255  255   BANDSPCT  --   --  23*     BMP    Recent Labs     10/25/23  0504 10/26/23  0357   SODIUM 135 138   K 5.2 5.0    102   CO2 31 30   AGAP 3 6   BUN 28* 26*   CREATININE 1.11 1.11   CALCIUM 9.4 9.7       Coags    No recent results     Additional Electrolytes  Recent Labs     10/26/23  0357   MG 2.1   PHOS 3.3          Blood Gas    No recent results    Recent Labs     10/26/23  0357   PHVEN 7.363   [id number] 55.6*   PO2VEN 31.6*   [id number] 30.9*   BEVEN 4.6    LFTs  No recent results    Infectious  No recent results  Glucose  Recent Labs     10/25/23  0504 10/26/23  0357   GLUC 104 125               Jose Alfredo Sosa MD

## 2023-10-26 NOTE — PLAN OF CARE
Problem: MOBILITY - ADULT  Goal: Maintain or return to baseline ADL function  Description: INTERVENTIONS:  -  Assess patient's ability to carry out ADLs; assess patient's baseline for ADL function and identify physical deficits which impact ability to perform ADLs (bathing, care of mouth/teeth, toileting, grooming, dressing, etc.)  - Assess/evaluate cause of self-care deficits   - Assess range of motion  - Assess patient's mobility; develop plan if impaired  - Assess patient's need for assistive devices and provide as appropriate  - Encourage maximum independence but intervene and supervise when necessary  - Involve family in performance of ADLs  - Assess for home care needs following discharge   - Consider OT consult to assist with ADL evaluation and planning for discharge  - Provide patient education as appropriate  Outcome: Progressing  Goal: Maintains/Returns to pre admission functional level  Description: INTERVENTIONS:  - Perform BMAT or MOVE assessment daily.   - Set and communicate daily mobility goal to care team and patient/family/caregiver.    - Collaborate with rehabilitation services on mobility goals if consulted  - Out of bed for toileting  - Record patient progress and toleration of activity level   Outcome: Progressing     Problem: Prexisting or High Potential for Compromised Skin Integrity  Goal: Skin integrity is maintained or improved  Description: INTERVENTIONS:  - Identify patients at risk for skin breakdown  - Assess and monitor skin integrity  - Assess and monitor nutrition and hydration status  - Monitor labs   - Assess for incontinence   - Turn and reposition patient  - Assist with mobility/ambulation  - Relieve pressure over bony prominences  - Avoid friction and shearing  - Provide appropriate hygiene as needed including keeping skin clean and dry  - Evaluate need for skin moisturizer/barrier cream  - Collaborate with interdisciplinary team   - Patient/family teaching  - Consider wound care consult   Outcome: Progressing     Problem: Nutrition/Hydration-ADULT  Goal: Nutrient/Hydration intake appropriate for improving, restoring or maintaining nutritional needs  Description: Monitor and assess patient's nutrition/hydration status for malnutrition. Collaborate with interdisciplinary team and initiate plan and interventions as ordered. Monitor patient's weight and dietary intake as ordered or per policy. Utilize nutrition screening tool and intervene as necessary. Determine patient's food preferences and provide high-protein, high-caloric foods as appropriate.      INTERVENTIONS:  - Monitor oral intake, urinary output, labs, and treatment plans  - Assess nutrition and hydration status and recommend course of action  - Evaluate amount of meals eaten  - Assist patient with eating if necessary   - Allow adequate time for meals  - Recommend/ encourage appropriate diets, oral nutritional supplements, and vitamin/mineral supplements  - Order, calculate, and assess calorie counts as needed  - Recommend, monitor, and adjust tube feedings and TPN/PPN based on assessed needs  - Assess need for intravenous fluids  - Provide specific nutrition/hydration education as appropriate  - Include patient/family/caregiver in decisions related to nutrition  Outcome: Progressing     Problem: PAIN - ADULT  Goal: Verbalizes/displays adequate comfort level or baseline comfort level  Description: Interventions:  - Encourage patient to monitor pain and request assistance  - Assess pain using appropriate pain scale  - Administer analgesics based on type and severity of pain and evaluate response  - Implement non-pharmacological measures as appropriate and evaluate response  - Consider cultural and social influences on pain and pain management  - Notify physician/advanced practitioner if interventions unsuccessful or patient reports new pain  Outcome: Progressing     Problem: INFECTION - ADULT  Goal: Absence or prevention of progression during hospitalization  Description: INTERVENTIONS:  - Assess and monitor for signs and symptoms of infection  - Monitor lab/diagnostic results  - Monitor all insertion sites, i.e. indwelling lines, tubes, and drains  - Monitor endotracheal if appropriate and nasal secretions for changes in amount and color  - [place] appropriate cooling/warming therapies per order  - Administer medications as ordered  - Instruct and encourage patient and family to use good hand hygiene technique  - Identify and instruct in appropriate isolation precautions for identified infection/condition  Outcome: Progressing  Goal: Absence of fever/infection during neutropenic period  Description: INTERVENTIONS:  - Monitor WBC    Outcome: Progressing     Problem: SAFETY ADULT  Goal: Maintain or return to baseline ADL function  Description: INTERVENTIONS:  -  Assess patient's ability to carry out ADLs; assess patient's baseline for ADL function and identify physical deficits which impact ability to perform ADLs (bathing, care of mouth/teeth, toileting, grooming, dressing, etc.)  - Assess/evaluate cause of self-care deficits   - Assess range of motion  - Assess patient's mobility; develop plan if impaired  - Assess patient's need for assistive devices and provide as appropriate  - Encourage maximum independence but intervene and supervise when necessary  - Involve family in performance of ADLs  - Assess for home care needs following discharge   - Consider OT consult to assist with ADL evaluation and planning for discharge  - Provide patient education as appropriate  Outcome: Progressing  Goal: Maintains/Returns to pre admission functional level  Description: INTERVENTIONS:  - Perform BMAT or MOVE assessment daily.   - Set and communicate daily mobility goal to care team and patient/family/caregiver.    - Collaborate with rehabilitation services on mobility goals if consulted  - Out of bed for toileting  - Record patient progress and toleration of activity level   Outcome: Progressing  Goal: Patient will remain free of falls  Description: INTERVENTIONS:  - Educate patient/family on patient safety including physical limitations  - Instruct patient to call for assistance with activity   - Consult OT/PT to assist with strengthening/mobility   - Keep Call bell within reach  - Keep bed low and locked with side rails adjusted as appropriate  - Keep care items and personal belongings within reach  - Initiate and maintain comfort rounds  - Apply yellow socks and bracelet for high fall risk patients  - Consider moving patient to room near nurses station  Outcome: Progressing     Problem: DISCHARGE PLANNING  Goal: Discharge to home or other facility with appropriate resources  Description: INTERVENTIONS:  - Identify barriers to discharge w/patient and caregiver  - Arrange for needed discharge resources and transportation as appropriate  - Identify discharge learning needs (meds, wound care, etc.)  - Arrange for interpretive services to assist at discharge as needed  - Refer to Case Management Department for coordinating discharge planning if the patient needs post-hospital services based on physician/advanced practitioner order or complex needs related to functional status, cognitive ability, or social support system  Outcome: Progressing     Problem: Knowledge Deficit  Goal: Patient/family/caregiver demonstrates understanding of disease process, treatment plan, medications, and discharge instructions  Description: Complete learning assessment and assess knowledge base.   Interventions:  - Provide teaching at level of understanding  - Provide teaching via preferred learning methods  Outcome: Progressing

## 2023-10-26 NOTE — PLAN OF CARE
Problem: MOBILITY - ADULT  Goal: Maintain or return to baseline ADL function  Description: INTERVENTIONS:  -  Assess patient's ability to carry out ADLs; assess patient's baseline for ADL function and identify physical deficits which impact ability to perform ADLs (bathing, care of mouth/teeth, toileting, grooming, dressing, etc.)  - Assess/evaluate cause of self-care deficits   - Assess range of motion  - Assess patient's mobility; develop plan if impaired  - Assess patient's need for assistive devices and provide as appropriate  - Encourage maximum independence but intervene and supervise when necessary  - Involve family in performance of ADLs  - Assess for home care needs following discharge   - Consider OT consult to assist with ADL evaluation and planning for discharge  - Provide patient education as appropriate  Outcome: Progressing  Goal: Maintains/Returns to pre admission functional level  Description: INTERVENTIONS:  - Perform BMAT or MOVE assessment daily.   - Set and communicate daily mobility goal to care team and patient/family/caregiver. - Collaborate with rehabilitation services on mobility goals if consulted  - Perform Range of Motion  times a day. - Reposition patient every  hours.   - Dangle patient  times a day  - Stand patient  times a day  - Ambulate patient  times a day  - Out of bed to chair  times a day   - Out of bed for meals  times a day  - Out of bed for toileting  - Record patient progress and toleration of activity level   Outcome: Progressing     Problem: Prexisting or High Potential for Compromised Skin Integrity  Goal: Skin integrity is maintained or improved  Description: INTERVENTIONS:  - Identify patients at risk for skin breakdown  - Assess and monitor skin integrity  - Assess and monitor nutrition and hydration status  - Monitor labs   - Assess for incontinence   - Turn and reposition patient  - Assist with mobility/ambulation  - Relieve pressure over bony prominences  - Avoid friction and shearing  - Provide appropriate hygiene as needed including keeping skin clean and dry  - Evaluate need for skin moisturizer/barrier cream  - Collaborate with interdisciplinary team   - Patient/family teaching  - Consider wound care consult   Outcome: Progressing     Problem: Nutrition/Hydration-ADULT  Goal: Nutrient/Hydration intake appropriate for improving, restoring or maintaining nutritional needs  Description: Monitor and assess patient's nutrition/hydration status for malnutrition. Collaborate with interdisciplinary team and initiate plan and interventions as ordered. Monitor patient's weight and dietary intake as ordered or per policy. Utilize nutrition screening tool and intervene as necessary. Determine patient's food preferences and provide high-protein, high-caloric foods as appropriate.      INTERVENTIONS:  - Monitor oral intake, urinary output, labs, and treatment plans  - Assess nutrition and hydration status and recommend course of action  - Evaluate amount of meals eaten  - Assist patient with eating if necessary   - Allow adequate time for meals  - Recommend/ encourage appropriate diets, oral nutritional supplements, and vitamin/mineral supplements  - Order, calculate, and assess calorie counts as needed  - Recommend, monitor, and adjust tube feedings and TPN/PPN based on assessed needs  - Assess need for intravenous fluids  - Provide specific nutrition/hydration education as appropriate  - Include patient/family/caregiver in decisions related to nutrition  Outcome: Progressing     Problem: PAIN - ADULT  Goal: Verbalizes/displays adequate comfort level or baseline comfort level  Description: Interventions:  - Encourage patient to monitor pain and request assistance  - Assess pain using appropriate pain scale  - Administer analgesics based on type and severity of pain and evaluate response  - Implement non-pharmacological measures as appropriate and evaluate response  - Consider cultural and social influences on pain and pain management  - Notify physician/advanced practitioner if interventions unsuccessful or patient reports new pain  Outcome: Progressing     Problem: INFECTION - ADULT  Goal: Absence or prevention of progression during hospitalization  Description: INTERVENTIONS:  - Assess and monitor for signs and symptoms of infection  - Monitor lab/diagnostic results  - Monitor all insertion sites, i.e. indwelling lines, tubes, and drains  - Monitor endotracheal if appropriate and nasal secretions for changes in amount and color  - [place] appropriate cooling/warming therapies per order  - Administer medications as ordered  - Instruct and encourage patient and family to use good hand hygiene technique  - Identify and instruct in appropriate isolation precautions for identified infection/condition  Outcome: Progressing  Goal: Absence of fever/infection during neutropenic period  Description: INTERVENTIONS:  - Monitor WBC    Outcome: Progressing     Problem: SAFETY ADULT  Goal: Maintain or return to baseline ADL function  Description: INTERVENTIONS:  -  Assess patient's ability to carry out ADLs; assess patient's baseline for ADL function and identify physical deficits which impact ability to perform ADLs (bathing, care of mouth/teeth, toileting, grooming, dressing, etc.)  - Assess/evaluate cause of self-care deficits   - Assess range of motion  - Assess patient's mobility; develop plan if impaired  - Assess patient's need for assistive devices and provide as appropriate  - Encourage maximum independence but intervene and supervise when necessary  - Involve family in performance of ADLs  - Assess for home care needs following discharge   - Consider OT consult to assist with ADL evaluation and planning for discharge  - Provide patient education as appropriate  Outcome: Progressing  Goal: Maintains/Returns to pre admission functional level  Description: INTERVENTIONS:  - Perform BMAT or MOVE assessment daily.   - Set and communicate daily mobility goal to care team and patient/family/caregiver. - Collaborate with rehabilitation services on mobility goals if consulted  - Perform Range of Motion  times a day. - Reposition patient every  hours.   - Dangle patient  times a day  - Stand patient  times a day  - Ambulate patient  times a day  - Out of bed to chair  times a day   - Out of bed for meals  times a day  - Out of bed for toileting  - Record patient progress and toleration of activity level   Outcome: Progressing  Goal: Patient will remain free of falls  Description: INTERVENTIONS:  - Educate patient/family on patient safety including physical limitations  - Instruct patient to call for assistance with activity   - Consult OT/PT to assist with strengthening/mobility   - Keep Call bell within reach  - Keep bed low and locked with side rails adjusted as appropriate  - Keep care items and personal belongings within reach  - Initiate and maintain comfort rounds  - Make Fall Risk Sign visible to staff  - Offer Toileting every  Hours, in advance of need  - Initiate/Maintain alarm  - Obtain necessary fall risk management equipment  - Apply yellow socks and bracelet for high fall risk patients  - Consider moving patient to room near nurses station  Outcome: Progressing     Problem: DISCHARGE PLANNING  Goal: Discharge to home or other facility with appropriate resources  Description: INTERVENTIONS:  - Identify barriers to discharge w/patient and caregiver  - Arrange for needed discharge resources and transportation as appropriate  - Identify discharge learning needs (meds, wound care, etc.)  - Arrange for interpretive services to assist at discharge as needed  - Refer to Case Management Department for coordinating discharge planning if the patient needs post-hospital services based on physician/advanced practitioner order or complex needs related to functional status, cognitive ability, or social support system  Outcome: Progressing     Problem: Knowledge Deficit  Goal: Patient/family/caregiver demonstrates understanding of disease process, treatment plan, medications, and discharge instructions  Description: Complete learning assessment and assess knowledge base.   Interventions:  - Provide teaching at level of understanding  - Provide teaching via preferred learning methods  Outcome: Progressing

## 2023-10-26 NOTE — CASE MANAGEMENT
Case Management Assessment & Discharge Planning Note    Patient name Wilma Bacon  Location ICU 03/ICU 03 MRN [id number]  : 1955 Date 10/26/2023       Current Admission Date: 2023  Current Admission Diagnosis:Acute respiratory failure with hypoxia secondary to oropharyngeal mass   Patient Active Problem List    Diagnosis Date Noted    Pancytopenia ([address]) 10/23/2023    Depression 10/02/2023    Generalized abdominal pain 2023    Chronic Superficial thrombophlebitis 2023    Large cell lymphoma ([address]) 2023    Chemotherapy induced neutropenia  2023    Blister (nonthermal) of left forearm, sequela 2023    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    Angioedema 2023    Ambulatory dysfunction 2023    Acute respiratory failure with hypoxia secondary to oropharyngeal mass 2023    (HFpEF) heart failure with preserved ejection fraction ([address]) 2023    Pulmonary embolism ([address]) 2023    CKD (chronic kidney disease) stage 3, GFR 30-59 ml/min (HCC) 2021    Bone lesion 2021    Nicotine dependence 06/15/2021    Bipolar disorder, unspecified ([address]) 06/10/2021    Abnormal computed tomography angiography (CTA) 2020    Angio-edema, initial encounter 07/10/2020    Pain syndrome, chronic 2014    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 [address]   [place] [address]  Phone: [phone] Fax: [address] - [address] [facility] [place]asha Hernandez [place] [address]  Phone: [phone] Fax: [phone]    Primary Care Provider: Rubina Grace MD    Primary Insurance: Zari Ryan [facility] REP  Secondary Insurance: [address]    DISCHARGE DETAILS:    Discharge planning discussed with[de-identified] pt  Freedom of Choice: Yes  Comments - [place] of Choice: [address]    Contacts  Patient Contacts: Linnette-daughter  Relationship to Patient[de-identified] Family  Contact Method: Phone  Reason/Outcome: Continuity of Care, Emergency Contact, Discharge Planning, Referral    Other Referral/Resources/Interventions Provided:  Interventions: Short Term Rehab  Referral Comments: CM met with pt at bedside. She reviewed list for STR and preference is for [address] or [facility]. Pt aware that  Pulmonary physicians do work at [address]. Pt preference is for there at this time. Pt aware she is not yet ready to transition until she is off of [facility]. Goal is for next week. SHIRA spoke with the [facility] liaison - Pablo Nolan. Pablo Nolan confirmed they can accept. Pt would need to be off the vent for a continuous 72 hours. No higher than 50% FiO2. Would require a cuffless trach. As per Pablo Nolan pt is able to return to the hospital for her Chemo Treatment. SHIRA did inform her as per the physician Hem/Onc confirmed pt would get chemo every 3 weeks and would require a 4 day admission. Pablo Nolan confirmed this is ok from their standpoint. If pt were to return to their facility woudl require auth prior. Geeta plans to come see pt tomorrow. She is aware CM will f/u on Monday. SHIRA updated Dr. Juany Trevizo of above.     Treatment Team Recommendation: Short Term Rehab  Discharge Destination Plan[de-identified] Short Term Rehab

## 2023-10-26 NOTE — ASSESSMENT & PLAN NOTE
Cuffless trach placed 9/17, transitioned to cuffed on 10/3. Oxygen requirements not improving. For mental health patient is on buspirone, quetiapine, and sertraline. For pain, gabapentin, oxycodone scheduled and prn, prn oxycodone mainly utilized for increased pain during chemo and after a bronch. On Guaifenesin, Atrovent and Xopenex for airway maintenance. No improvement in bilateral consolidation or atelectasis and groundglass opacities on repeat CT and chest x-rays. Patient not receiving proper positive pressure to open up atelectatic lung. Bronchial cx with 3 colonies CoNS. PCP PCR invalid, repeat negative. CTCAP indicates additional groundglass opacity with paraseptal emphysema, which may be contributing to inability to remain off vent. SBTs have been unsuccessful to place patient on trach collar since return to ICU. Patient tolerated high flow all day and overnight. Back on vent overnight, failed trach collar o/n. CXR 10/19 appears unchanged from 10/16 just not good breath during image, less of right lung visualized this time. Did well overnight on trach collar trial and today she is at 11 L. After 24 hours trial and patient not desaturating or become dyspneic, she was transferred to level 2 step down. She was desatting on HFNC and VBGs showed hypercarbia and hypoxia, due to which she was transferred to critical care. Recent Labs     10/24/23  1127 10/25/23  1240 10/26/23  0357   PHART 7.385  --   --    SCR7ODM 49.5*  --   --    PO2ART 126.5  --   --    KNE2NWX 29.0*  --   --    PHVEN  --    < > 7.363   [id number]  --    < > 55.6*   PO2VEN  --    < > 31.6*   [id number]  --    < > 30.9*   BEVEN  --    < > 4.6   SOURCE Radial, Right  --   --     < > = values in this interval not displayed. Plan:  Continue trach collar during the day and at night. Goal 350 TV, 12/6 pressure and PEEP 6.   Maintain cuff overnight from 9 pm to 6 am for positive pressure, can open cuff during the day for patient to speak.  Discussed with CM regarding placement to a place from where she can get to her chemotherapy infusions. Patient is deciding between two placements.

## 2023-10-26 NOTE — WOUND OSTOMY CARE
Progress Note - Wound   Kemar Chávez 77 y.o. female MRN: [id number]  Unit/Bed#: ICU 03 Encounter: [id number]      Assessment:   Patient admitted due to acute respiratory failure with hypoxia secondary to oropharyngeal mass. History of HLD, HTN, HFpEF, COPD, CKD. Wound care follow-up for pressure injury around trach site. Patient agreeable to assessment, alert and oriented x4, continent of bowel and bladder, independently turns for assessment, tracheostomy with humidified oxygen, tube feeds for nutrition, is an assist with care, is in ICU on a critical care low air loss mattress. Primary RN made aware of assessment. 1. Bilateral sacrum, buttock, hips, elbows, and heels- skin is dry, intact, blanchable. 2. Unstageable pressure injury under trach- Wound is improving. Suspect etiology to be pressure and friction from trach plate, now with moisture component due to mucous secretions. Wound is oval in shape, full-thickness, true depth unknown, approx. 30% pink/pale pink tissue and 70% thin layer of yellow adhered tissue, with small amount of serous drainage noted. Tracy-wound is dry, intact, blanchable skin. Educated patient on importance of frequent offloading of pressure via turning, repositioning, and weight-shifting. No induration, fluctuance, odor, warmth, redness, or purulence noted to the above noted wound. New dressing applied. Patient tolerated well, reports mild discomfort to the wound. Primary nurse aware of plan of care. See flow sheets for more detailed assessment findings. Will follow along. Skin care plans:  1-Hydraguard to bilateral sacrum, buttock and heels BID and PRN  2-Elevate heels to offload pressure. 3-Ehob cushion in chair when out of bed. 4-Moisturize skin daily with skin nourishing cream.  5-Turn/reposition q2h or when medically stable for pressure re-distribution on skin.     6-Cleanse neck wound with normal saline, apply maxorb to wound, cover with split optifoam, change daily and PRN soilage/displacement. Wound 09/21/23 Pressure Injury Throat Medial (Active)   Wound Image   10/26/23 0927   Wound Description Yellow;Pink;Pale 10/26/23 0927   Pressure Injury Stage U 10/26/23 0927   Tracy-wound Assessment Dry; Intact 10/26/23 0927   Wound Length (cm) 1.2 cm 10/26/23 0927   Wound Width (cm) 1.5 cm 10/26/23 0927   Wound Depth (cm) 0.2 cm 10/26/23 0927   Wound Surface Area (cm^2) 1.8 cm^2 10/26/23 0927   Wound Volume (cm^3) 0.36 cm^3 10/26/23 0927   Calculated Wound Volume (cm^3) 0.36 cm^3 10/26/23 0927   Change in Wound Size % -80 10/26/23 0927   Tunneling 0 cm 10/26/23 0927   Tunneling in depth located at 0 10/26/23 0927   Undermining 0 10/26/23 0927   Undermining is depth extending from 0 10/26/23 0927   Wound Site Closure     Drainage Amount Small 10/26/23 0927   Drainage Description Serous 10/26/23 0927   Non-staged Wound Description Full thickness 10/26/23 0927   Treatments Cleansed;Irrigation with NSS;Site care 10/26/23 0927   Dressing Calcium Alginate;Gauze 10/26/23 0927   Wound packed? No 10/26/23 0927   Packing- # removed 0 10/26/23 0927   Packing- # inserted 0 10/26/23 0927   Dressing Changed Changed 10/26/23 0927   Patient Tolerance Tolerated well 10/26/23 0927   Dressing Status Clean;Dry; Intact 10/26/23 0927       Call or tigertext with any questions  Wound Care will continue to follow    Mikael Holstein BSN RN [facility]  Wound and Ostomy care

## 2023-10-26 NOTE — PHYSICAL THERAPY NOTE
PT TREATMENT     10/26/23 4176   PT Last Visit   PT Visit Date 10/26/23   Note Type   Note Type Treatment   Pain Assessment   Pain Assessment Tool 0-10   Pain Score No Pain   Restrictions/Precautions   Other Precautions Multiple lines;Aspiration; Fall Risk;Bed Alarm; Chair Alarm  (High Flow with trach collar 100% on 12L)   General   Chart Reviewed Yes   Family/Caregiver Present No   Cognition   Overall Cognitive Status WFL   Arousal/Participation Cooperative   Attention Within functional limits   Orientation Level Oriented X4   Following Commands Follows multistep commands without difficulty   Comments At least 2 pt identifiers including name and    Subjective   Subjective "ok" "I have to pee" Pt declined OOB to chair but was agreeable to bedside ther-ex   Bed Mobility   Additional Comments Pt received sitting EOB   Transfers   Sit to Stand 5  Supervision   Additional items Assist x 1;Verbal cues; Increased time required   Stand to Sit 5  Supervision   Additional items Assist x 1;Verbal cues; Increased time required   Stand pivot 5  Supervision  (bed <--> commode;pt is supervision for hygiene after toileting)   Additional items Assist x 1;Verbal cues; Increased time required;Armrests   Balance   Static Sitting Good   Dynamic Sitting Fair +  (pt able to don bilateral slipper socks with Mod I)   Static Standing Fair   Dynamic Standing Fair   Activity Tolerance   Activity Tolerance Patient tolerated treatment well;Patient limited by fatigue   Exercises   Hip Flexion 10 reps;Bilateral;Sitting   Hip Abduction 10 reps;Bilateral;Sitting   Knee AROM Long Arc Quad 10 reps;Bilateral;Sitting   Ankle Pumps 10 reps;Bilateral;Sitting   Assessment   Problem List Decreased strength;Decreased endurance; Impaired balance;Decreased mobility; Decreased safety awareness   Assessment Pt agreeable to PT session this pm. Pt with good tolerance to SPT bed <--> commode/functional mobility/toileting and LE ex while seated EOB.  Pt did decline OOB to chair and short distance (limited by multiple lines) ambulation. Pt somewhat fatigued at end of PT session. Pt will cont to benefit from skilled PT services to cont to increase pt's strength, balance, endurance, functional mobility and progression with gait. Pt remains appropriate for post acute rehab services at [facility]. The patient's -[facility] Basic Mobility Inpatient Short Form Raw Score is 16. A Raw score of less than or equal to 16 suggests the patient may benefit from discharge to post-acute rehabilitation services. Please also refer to the recommendation of the Physical Therapist for safe discharge planning. Goals   STG Expiration Date 10/30/23   Short Term Goal #1 Patient will: Increase bilateral LE strength 1/2 grade to facilitate independent mobility, Perform all bed mobility tasks w/ minx1 to improve pt's independence w/ repositioning for decrease risk of skin breakdown, Perform all transfers w/ minx1 consistently from various height surfaces in order to improve I w/ engagement w/ real-world environments/situations, Ambulate at least 10+ ft. with roller walker w/ minx1 w/o LOB to facilitate return and engagement w/ previous living environment, Increase ambulatory and static and dynamic standing balance 1 grade to decrease risk for falls, Tolerate at least 15 consecutive minutes of activity to demonstrate improved activity tolerance and endurance and Tolerate 3 hr OOB to faciliate upright tolerance   Plan   Treatment/Interventions ADL retraining;Functional transfer training;LE strengthening/ROM; Therapeutic exercise; Endurance training;Patient/family training;Equipment eval/education; Bed mobility;Gait training; Compensatory technique education   PT Frequency 2-3x/wk   Discharge Recommendation   PT Discharge Recommendation Post acute rehabilitation services   [facility] Basic Mobility Inpatient   Turning in Flat Bed Without Bedrails 3   Lying on Back to Sitting on Edge of Flat Bed Without Bedrails 3   Moving Bed to Chair 3   Standing Up From Chair Using Arms 3   Walk in Room 3   Climb 3-5 Stairs With Railing 1   Basic Mobility Inpatient Raw Score 16   Basic Mobility Standardized Score 38.32   Turning Head Towards Sound 3   Follow Simple Instructions 3   Low Function Basic Mobility Raw Score  22   Low Function Basic Mobility Standardized Score  35.85   Highest Level Of Mobility   -HLM Goal 5: Stand one or more mins   JH-HLM Achieved 4: Move to chair/commode  (pt declined standing/amb with RW)   Education   Education Provided Mobility training;Home exercise program   Patient Explanation/teachback used; Reinforcement needed   End of Consult   Patient Position at End of Consult Seated edge of bed; All needs within reach;Bed/Chair alarm activated  Eleuterio Dolan RN aware pt EOB)   Licensure   [place] License Number  Anastasia Coleman, [id number]

## 2023-10-26 NOTE — ASSESSMENT & PLAN NOTE
Lab Results   Component Value Date    CREATININE 1.11 10/26/2023    CREATININE 1.11 10/25/2023    CREATININE 1.04 10/24/2023       Likely prerenal.  Second to poor oral intake versus poor urine output. Baseline creatinine 1 to 1.2.     Cr at baseline  Plan:  Urinary retention protocol, Daily weights, I/O  Trend Cr daily

## 2023-10-26 NOTE — QUICK NOTE
Discussed with Dr Margaret Royal regarding the patient. We will begin to wean down trach collar FIO2 with a goal of down to 50%. Goal SpoO2 is above 90%.

## 2023-10-26 NOTE — ASSESSMENT & PLAN NOTE
Large B-cell lymphoma, stage II. First chemo cycle 9/22 4 days of R-EPOCH. 9/27 Rituxan. 9/28 Filgrastim. Acyclovir, Zyloprim, Diflucan, Levaquin, Zofran, Bactrim for chemo prophylaxis, all discontinued with heme-onc's approval as Nafisa reached 4200. As per heme/onc attending pancytopenia is expected with her chemotherapy. Restarted all ppx 10/13 for [facility] cycle 2, which was done over 4 days. Ppx and filgrastim can be discontinued when patient is no longer neutropenic after this cycle again. Tunneled line in R IJ in place. She received cytoxan on 10/19. Neutropenic precautions will be needed in a few days    Plan:  Transfuse blood products as needed. Keep Hgb>7 and Plt>20 for chemo   Continue rituximab infusions as per heme/onc plan. Receiving daily filgrastim.   See acute respiratory failure above

## 2023-10-26 NOTE — ASSESSMENT & PLAN NOTE
Lab Results   Component Value Date    EGFR 50 10/26/2023    EGFR 50 10/25/2023    EGFR 54 10/24/2023    CREATININE 1.11 10/26/2023    CREATININE 1.11 10/25/2023    CREATININE 1.04 10/24/2023     Baseline Cr between 0.75-1.1  Initial HERIBERTO felt 2/2 prerenal azotemia 2/2 diuresis, reduced PO intake +/- ATN. Patient received 2 doses of Bumex IV 2 g as she had not been responding appropriately to IV 40 Lasix daily. Creatinine went up by 0.5 meeting criteria for an HERIBERTO. This may be prerenal intrinsic or postrenal.  Potential prerenal causes include reduced kidney perfusion due to lisinopril. Intrinsic can also be lisinopril due to ATN, AIN from chemo medications, TLS, crystaline nephropathy from acyclovir, rituxan nephrotoxicity, filgrastim glomerulonephritis. Post renal obstructive could be from urine retention from vincristine or cyclophosphamide bladder fibrosis. Suspect most likely due to medications as a combination of prerenal and intrinsic. FENa was 0.2%, UA shows no casts or signs of infection, urine eosinophils 0%. Patient likely has prerenal HERIBERTO from volume depletion. Received 2 L NS and 1 pRBC and cr went up by 0.07 still and Na and Cl went down, suspect that volume prevented further cr rise and free water excretion impaired by kidney function, allowing hyponatremia to increase. Creatinine increase is likely plateaued and went down the following day. Suspect that now that nephrotoxic medications have been stopped she will continue to respond well to Lasix and creatinine will continue to downtrend. HERIBERTO now resolved. Will be cautious about nephrotoxins and monitor as restarting EPOCH and ppx. Patient gets volume depleted and overloaded very easily. Especially from chemo. 10/19 Heriberto as cr went up by 0.3 in 48 hours from 0.82 on 10/17 to 1.22 on 10/19. Suspect this is prerenal hypovolemic, will see if patient improves with fluids. She gets overloaded easily so if no improvement suspect CRS again.  Received 2L bolus NS. Creatinine elevated still. Plan: Continue to monitor UO/renal function.  Avoid nephrotoxic agents

## 2023-10-27 ENCOUNTER — APPOINTMENT (INPATIENT)
Dept: RADIOLOGY | Facility: HOSPITAL | Age: 68
DRG: 004 | End: 2023-10-27
Payer: COMMERCIAL

## 2023-10-27 PROBLEM — D61.810 PANCYTOPENIA DUE TO CHEMOTHERAPY (HCC): Status: ACTIVE | Noted: 2023-10-23

## 2023-10-27 LAB
ANION GAP SERPL CALCULATED.3IONS-SCNC: 5 MMOL/L
BASE EX.OXY STD BLDV CALC-SCNC: 63.8 % (ref 60–80)
BASE EXCESS BLDV CALC-SCNC: 6.3 MMOL/L
BODY TEMPERATURE: 99.2 DEGREES FEHRENHEIT
BUN SERPL-MCNC: 24 MG/DL (ref 5–25)
CALCIUM SERPL-MCNC: 10 MG/DL (ref 8.4–10.2)
CHLORIDE SERPL-SCNC: 102 MMOL/L (ref 96–108)
CO2 SERPL-SCNC: 31 MMOL/L (ref 21–32)
CREAT SERPL-MCNC: 1.14 MG/DL (ref 0.6–1.3)
ERYTHROCYTE [DISTWIDTH] IN BLOOD BY AUTOMATED COUNT: 15.8 % (ref 11.6–15.1)
GFR SERPL CREATININE-BSD FRML MDRD: 48 ML/MIN/1.73SQ M
GLUCOSE SERPL-MCNC: 121 MG/DL (ref 65–140)
HCO3 BLDV-SCNC: 33.3 MMOL/L (ref 24–30)
HCT VFR BLD AUTO: 26.4 % (ref 34.8–46.1)
HGB BLD-MCNC: 8.3 G/DL (ref 11.5–15.4)
MAGNESIUM SERPL-MCNC: 2.1 MG/DL (ref 1.9–2.7)
MCH RBC QN AUTO: 31.1 PG (ref 26.8–34.3)
MCHC RBC AUTO-ENTMCNC: 31.4 G/DL (ref 31.4–37.4)
MCV RBC AUTO: 99 FL (ref 82–98)
O2 CT BLDV-SCNC: 8.7 ML/DL
PCO2 BLDV: 62.7 MM HG (ref 42–50)
PH BLDV: 7.34 [PH] (ref 7.3–7.4)
PHOSPHATE SERPL-MCNC: 3.6 MG/DL (ref 2.3–4.1)
PLATELET # BLD AUTO: 209 THOUSANDS/UL (ref 149–390)
PMV BLD AUTO: 9.9 FL (ref 8.9–12.7)
PO2 BLDV: 34.7 MM HG (ref 35–45)
POTASSIUM SERPL-SCNC: 5 MMOL/L (ref 3.5–5.3)
RBC # BLD AUTO: 2.67 MILLION/UL (ref 3.81–5.12)
SODIUM SERPL-SCNC: 138 MMOL/L (ref 135–147)
WBC # BLD AUTO: 22.87 THOUSAND/UL (ref 4.31–10.16)

## 2023-10-27 PROCEDURE — 80048 BASIC METABOLIC PNL TOTAL CA: CPT

## 2023-10-27 PROCEDURE — 82805 BLOOD GASES W/O2 SATURATION: CPT

## 2023-10-27 PROCEDURE — 85027 COMPLETE CBC AUTOMATED: CPT

## 2023-10-27 PROCEDURE — 99232 SBSQ HOSP IP/OBS MODERATE 35: CPT | Performed by: INTERNAL MEDICINE

## 2023-10-27 PROCEDURE — 94760 N-INVAS EAR/PLS OXIMETRY 1: CPT

## 2023-10-27 PROCEDURE — 94640 AIRWAY INHALATION TREATMENT: CPT

## 2023-10-27 PROCEDURE — 83735 ASSAY OF MAGNESIUM: CPT

## 2023-10-27 PROCEDURE — 84100 ASSAY OF PHOSPHORUS: CPT

## 2023-10-27 PROCEDURE — 71045 X-RAY EXAM CHEST 1 VIEW: CPT

## 2023-10-27 RX ORDER — SODIUM CHLORIDE FOR INHALATION 3 %
4 VIAL, NEBULIZER (ML) INHALATION
Status: DISCONTINUED | OUTPATIENT
Start: 2023-10-27 | End: 2023-10-31

## 2023-10-27 RX ORDER — SODIUM CHLORIDE, SODIUM GLUCONATE, SODIUM ACETATE, POTASSIUM CHLORIDE, MAGNESIUM CHLORIDE, SODIUM PHOSPHATE, DIBASIC, AND POTASSIUM PHOSPHATE .53; .5; .37; .037; .03; .012; .00082 G/100ML; G/100ML; G/100ML; G/100ML; G/100ML; G/100ML; G/100ML
500 INJECTION, SOLUTION INTRAVENOUS ONCE
Status: COMPLETED | OUTPATIENT
Start: 2023-10-27 | End: 2023-10-28

## 2023-10-27 RX ADMIN — SODIUM CHLORIDE SOLN NEBU 3% 4 ML: 3 NEBU SOLN at 19:40

## 2023-10-27 RX ADMIN — OXYCODONE HYDROCHLORIDE 5 MG: 5 SOLUTION ORAL at 13:38

## 2023-10-27 RX ADMIN — MELATONIN 3 MG: at 21:19

## 2023-10-27 RX ADMIN — OXYCODONE HYDROCHLORIDE 2.5 MG: 5 SOLUTION ORAL at 08:41

## 2023-10-27 RX ADMIN — LEVALBUTEROL HYDROCHLORIDE 1.25 MG: 1.25 SOLUTION RESPIRATORY (INHALATION) at 13:40

## 2023-10-27 RX ADMIN — ACYCLOVIR 400 MG: 200 CAPSULE ORAL at 08:24

## 2023-10-27 RX ADMIN — NICOTINE 7 MG: 7 PATCH, EXTENDED RELEASE TRANSDERMAL at 08:24

## 2023-10-27 RX ADMIN — BUSPIRONE HYDROCHLORIDE 30 MG: 10 TABLET ORAL at 18:06

## 2023-10-27 RX ADMIN — GABAPENTIN 300 MG: 300 CAPSULE ORAL at 21:19

## 2023-10-27 RX ADMIN — QUETIAPINE FUMARATE 50 MG: 25 TABLET ORAL at 21:18

## 2023-10-27 RX ADMIN — IPRATROPIUM BROMIDE 0.5 MG: 0.5 SOLUTION RESPIRATORY (INHALATION) at 19:40

## 2023-10-27 RX ADMIN — FAMOTIDINE 20 MG: 20 TABLET, FILM COATED ORAL at 08:23

## 2023-10-27 RX ADMIN — LEVALBUTEROL HYDROCHLORIDE 1.25 MG: 1.25 SOLUTION RESPIRATORY (INHALATION) at 07:49

## 2023-10-27 RX ADMIN — LEVALBUTEROL HYDROCHLORIDE 1.25 MG: 1.25 SOLUTION RESPIRATORY (INHALATION) at 19:40

## 2023-10-27 RX ADMIN — Medication 8.8 MG: at 21:49

## 2023-10-27 RX ADMIN — ENOXAPARIN SODIUM 40 MG: 40 INJECTION SUBCUTANEOUS at 08:23

## 2023-10-27 RX ADMIN — FOLIC ACID 1 MG: 1 TABLET ORAL at 08:24

## 2023-10-27 RX ADMIN — IPRATROPIUM BROMIDE 0.5 MG: 0.5 SOLUTION RESPIRATORY (INHALATION) at 13:40

## 2023-10-27 RX ADMIN — FAMOTIDINE 20 MG: 20 TABLET, FILM COATED ORAL at 17:57

## 2023-10-27 RX ADMIN — SODIUM CHLORIDE, SODIUM GLUCONATE, SODIUM ACETATE, POTASSIUM CHLORIDE, MAGNESIUM CHLORIDE, SODIUM PHOSPHATE, DIBASIC, AND POTASSIUM PHOSPHATE 500 ML: .53; .5; .37; .037; .03; .012; .00082 INJECTION, SOLUTION INTRAVENOUS at 22:40

## 2023-10-27 RX ADMIN — OXYCODONE HYDROCHLORIDE 5 MG: 5 SOLUTION ORAL at 03:52

## 2023-10-27 RX ADMIN — METOPROLOL TARTRATE 25 MG: 25 TABLET, FILM COATED ORAL at 08:24

## 2023-10-27 RX ADMIN — FLUCONAZOLE 200 MG: 100 TABLET ORAL at 08:23

## 2023-10-27 RX ADMIN — ACYCLOVIR 400 MG: 200 CAPSULE ORAL at 17:57

## 2023-10-27 RX ADMIN — BUSPIRONE HYDROCHLORIDE 30 MG: 10 TABLET ORAL at 08:24

## 2023-10-27 RX ADMIN — CHLORHEXIDINE GLUCONATE 15 ML: 1.2 SOLUTION ORAL at 08:23

## 2023-10-27 RX ADMIN — OXYCODONE HYDROCHLORIDE 5 MG: 5 SOLUTION ORAL at 21:19

## 2023-10-27 RX ADMIN — IPRATROPIUM BROMIDE 0.5 MG: 0.5 SOLUTION RESPIRATORY (INHALATION) at 07:49

## 2023-10-27 RX ADMIN — SULFAMETHOXAZOLE AND TRIMETHOPRIM 1 TABLET: 800; 160 TABLET ORAL at 08:24

## 2023-10-27 RX ADMIN — BUSPIRONE HYDROCHLORIDE 30 MG: 10 TABLET ORAL at 21:48

## 2023-10-27 RX ADMIN — OXYCODONE HYDROCHLORIDE 2.5 MG: 5 SOLUTION ORAL at 17:59

## 2023-10-27 RX ADMIN — CHLORHEXIDINE GLUCONATE 15 ML: 1.2 SOLUTION ORAL at 21:20

## 2023-10-27 RX ADMIN — SERTRALINE 75 MG: 25 TABLET, FILM COATED ORAL at 08:24

## 2023-10-27 RX ADMIN — LEVOFLOXACIN 250 MG: 250 TABLET, FILM COATED ORAL at 08:24

## 2023-10-27 RX ADMIN — GABAPENTIN 300 MG: 300 CAPSULE ORAL at 08:24

## 2023-10-27 RX ADMIN — GABAPENTIN 300 MG: 300 CAPSULE ORAL at 17:57

## 2023-10-27 RX ADMIN — AMLODIPINE BESYLATE 10 MG: 5 TABLET ORAL at 08:23

## 2023-10-27 RX ADMIN — METOPROLOL TARTRATE 25 MG: 25 TABLET, FILM COATED ORAL at 21:19

## 2023-10-27 NOTE — PROGRESS NOTES
[address]27 [facility]  Progress Note  Name: Mike Ardon  MRN: [id number]  Unit/Bed#: ICU 03 I Date of Admission: 9/13/2023   Date of Service: 10/28/2023 I Hospital Day: 39    Assessment/Plan   * Acute respiratory failure with hypoxia secondary to oropharyngeal mass  Assessment & Plan  Cuffless trach placed 9/17, transitioned to cuffed on 10/3. Oxygen requirements not improving. For mental health patient is on buspirone, quetiapine, and sertraline. For pain, gabapentin, oxycodone scheduled and prn, prn oxycodone mainly utilized for increased pain during chemo and after a bronch. On Guaifenesin, Atrovent and Xopenex for airway maintenance. No improvement in bilateral consolidation or atelectasis and groundglass opacities on repeat CT and chest x-rays. Patient not receiving proper positive pressure to open up atelectatic lung. Bronchial cx with 3 colonies CoNS. PCP PCR invalid, repeat negative. CTCAP indicates additional groundglass opacity with paraseptal emphysema, which may be contributing to inability to remain off vent. SBTs have been unsuccessful to place patient on trach collar since return to ICU. Patient tolerated high flow all day and overnight. Back on vent overnight, failed trach collar o/n. CXR 10/19 appears unchanged from 10/16 just not good breath during image, less of right lung visualized this time. Did well overnight on trach collar trial and today she is at 11 L. After 24 hours trial and patient not desaturating or become dyspneic, she was transferred to level 2 step down. She was desatting on HFNC and VBGs showed hypercarbia and hypoxia, due to which she was transferred to critical care. Recent Labs     10/27/23  0453   PHVEN 7.343   [id number] 62.7*   PO2VEN 34.7*   [id number] 33.3*   BEVEN 6.3         Plan:  Continue trach collar during the day and at night. Goal 350 TV, 12/6 pressure and PEEP 6. Goal FiO2 of 50%.   Maintain cuff overnight from 9 pm to 6 am for positive pressure, can open cuff during the day for patient to speak. Discussed with CM. New jannette for STR, she will be able to come to the hospital for chemotherapy infusions and be admitted for 4 days every 3 weeks. Large cell lymphoma ([address])  Assessment & Plan  Large B-cell lymphoma, stage II. First chemo cycle 9/22 4 days of R-EPOCH. 9/27 Rituxan. 9/28 Filgrastim. Acyclovir, Zyloprim, Diflucan, Levaquin, Zofran, Bactrim for chemo prophylaxis, all discontinued with heme-onc's approval as Nafisa reached 4200. As per heme/onc attending pancytopenia is expected with her chemotherapy. Restarted all ppx 10/13 for [facility] cycle 2, which was done over 4 days. Ppx and filgrastim can be discontinued when patient is no longer neutropenic after this cycle again. Tunneled line in R IJ in place. She received cytoxan on 10/19. Neutropenic precautions will be needed in a few days    Plan:  Transfuse blood products as needed. Keep Hgb>7 and Plt>20 for chemo   Continue rituximab infusions as per heme/onc plan. Receiving daily filgrastim. See acute respiratory failure above    Oropharyngeal mass  Assessment & Plan  9/14 Neck/ soft tissue CT: Large enhancing soft tissue mass identified within the left neck involving multiple spaces. Centered within the left oropharynx with extensions as described above into multiple spaces. This results in significant airway compromise. 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 Small level 3/level 4 nodes are seen within the neck. Tracheostomy by ENT. Replaced on 9/26. H/o tobacco abuse, daily smoker. On nicotine while inpatient, confirmed with patient she needs nicotine patch. CT soft tissue neck shows reduced mass effect from 9/14 to 10/13. Can consider reducing trach size if continues to improve    Plan:  ENT and heme onc following  Will titrate NRT weekly  As per speech therapist recommendations she will continue puree foods. Not ready to advance diet. Continue PEG tube feeds for nutrition.    See acute respiratory failure and large B cell lymphoma above      Pancytopenia due to chemotherapy [facility])  Assessment & Plan  Patient received 1 PRBC transfusion on 10/5. Hemoglobin 8.4 s/p 1 PRBC transfusion on 10/25. B/L is 12-14. No sites of bleeding, no black stools. Iron panel indicative of inflammatory anemia. Normal Vitamin B12 levels, negative hemolysis smear. Folate is low 5.5. Plan:  Trend hemoglobin and transfuse for <7. As per heme/onc transfuse irradiated and leukoreduced blood products. Trend platelets  Folic acid 1mg daily supplementation  As per my conversation with Heme/oncology attending, patient is expected to have chemotherapy associated pancytopenia. No further anemia w/u required. (HFpEF) heart failure with preserved ejection fraction [facility])  Assessment & Plan  Wt Readings from Last 3 Encounters:   10/28/23 76.8 kg (169 lb 5 oz)   09/07/23 74.6 kg (164 lb 6.4 oz)   08/30/23 73.3 kg (161 lb 9.6 oz)     Lab Results   Component Value Date    LVEF 60 08/28/2023     (H) 09/29/2023     (H) 09/13/2023     Echo 8/28/23: LVEF 60%. Plan:  K > 4   Measure I/O      Angioedema  Assessment & Plan  POA in [facility] ED: Swollen tongue, soft and hard palate with severe angioedema. Decadron 10 mg, Benadryl 25 mg given. 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 Plan:  Cuffless trach 9/17, cuffed Shiley XLT #7 10/3  See acute respiratory failure and oropharyngeal mass above  Continue to wean vent. Ambulatory dysfunction  Assessment & Plan  2/2 Impacted by chronic pain syndrome + prolonged hospital stay. Continue OOB with PT. PT rec post acute rehab however reportedly Cannot get LTACH placement while getting chemo per CM  She is aware STR SNFs continue to follow and treatment can be done from a SNF level of care. PT would need to be on trach collar for at least 48 hours with no need for the vent.  Aware that pt would need to be around 28% FiO2     Pain syndrome, chronic  Assessment & Plan  Home regimen: Oxycodone 5 mg BID, Tylenol 650 mg q8 prn. Gabapentin 600 mg TID. Medical marijuana. Lumbar radiculopathy and impaired ambulatory dysfunction secondary to pain. Has bowel regimen and is having bowel movements daily. Patient required additional pain management during previous cycle and has some additional pain from tunneled line placement    Plan:  Continue gabapentin 300 mg TID, oxycodone 5 mg TID, prn Tylenol 650 mg q6. Oxycodone 5mg every 8 hours  Oxycodone 2.5 mg q6 PRN    Benign essential hypertension  Assessment & Plan  Home regimen Coreg 12.5 mg BID, Amlodipine 10 mg daily, and lisinopril 40 mg. All discontinued at this time secondary to hypotension and PO since cycle 1. but stable currently without any antihypertensives. Systolic persistently elevated and tachycardic, potentially secondary to pain. Patient was more hypotensive during last chemo. Coreg contraindicated during chemo, since cycles are every other week, would be better to stick with lopressor during duration of therapy as opposed to switching constantly. Plan:  Monitor vitals. Continue Norvasc 10 and lopressor 25 bid  Prn hydralazine for SBP > 160    Depression  Assessment & Plan  Patient on Zoloft 75 daily and Seroquel hs  Patient is depressed, multiple family/palliative discussions. patient is firm that she wants to live and to fight, she is just tired. Currently goal is disease treatment oriented. Full code. No SI/HI ideations. Palliative signed off, will reconsult if patient changes her mind regarding wishes. On 10/20/23 she decided to leave A. Now waiting for CM and machine     Generalized abdominal pain  Assessment & Plan  Patient reports abdominal pain which is unchanged since 9/22 no guarding on exam. Takes Famotidine for GERD at home. Alk phos 10/2 145, 10/20 79.  CTCAP reveals complex right upper pole renal lesion requiring possible outpatient MRI    Continue Famotidine  On bowel regimen with Senna and Miralax prn    Chronic Superficial thrombophlebitis  Assessment & Plan  Right forearm soreness. RUE US: No acute or chronic deep vein thrombosis. 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 PO not severe enough to require renal dosing at this time, will continue to monitor and adjust dosing as needed. Continue DVT ppx with Lovenox 40    Blister (nonthermal) of left forearm, sequela  Assessment & Plan  Blisters of left upper extremity healing, no signs of infection, continue daily wound care. CKD (chronic kidney disease) stage 3, GFR 30-59 ml/min [facility])  Assessment & Plan  Lab Results   Component Value Date    EGFR 46 10/28/2023    EGFR 48 10/27/2023    EGFR 50 10/26/2023    CREATININE 1.19 10/28/2023    CREATININE 1.14 10/27/2023    CREATININE 1.11 10/26/2023     Baseline Cr between 0.75-1.1  Initial PO felt 2/2 prerenal azotemia 2/2 diuresis, reduced PO intake +/- ATN. Patient received 2 doses of Bumex IV 2 g as she had not been responding appropriately to IV 40 Lasix daily. Creatinine went up by 0.5 meeting criteria for an PO. This may be prerenal intrinsic or postrenal.  Potential prerenal causes include reduced kidney perfusion due to lisinopril. Intrinsic can also be lisinopril due to ATN, AIN from chemo medications, TLS, crystaline nephropathy from acyclovir, rituxan nephrotoxicity, filgrastim glomerulonephritis. Post renal obstructive could be from urine retention from vincristine or cyclophosphamide bladder fibrosis. Suspect most likely due to medications as a combination of prerenal and intrinsic. FENa was 0.2%, UA shows no casts or signs of infection, urine eosinophils 0%. Patient likely has prerenal PO from volume depletion. Received 2 L NS and 1 pRBC and cr went up by 0.07 still and Na and Cl went down, suspect that volume prevented further cr rise and free water excretion impaired by kidney function, allowing hyponatremia to increase. Creatinine increase is likely plateaued and went down the following day. Suspect that now that nephrotoxic medications have been stopped she responded well to Lasix and creatinine downtrended. HERIBERTO now resolved. Will be cautious about nephrotoxins and monitor as restarting EPOCH and ppx. Patient gets volume depleted and overloaded very easily. Especially from chemo. 10/19 Heriberto as cr went up by 0.3 in 48 hours from 0.82 on 10/17 to 1.22 on 10/19. Suspect this is prerenal hypovolemic, will see if patient improves with fluids. She gets overloaded easily so if no improvement suspect CRS again. Plan:   Continue to monitor UO/renal function. Avoid nephrotoxic agents             ICU Core Measures     Vented Patient  VAP Bundle  VAP bundle ordered     A: Assess, Prevent, and Manage Pain Has pain been assessed? Yes  Need for changes to pain regimen? No   B: Both Spontaneous Awakening Trials (SATs) and Spontaneous Breathing Trials (SBTs) Plan to perform spontaneous awakening trial today? N/A      C: Choice of Sedation RASS Goal: 0 Alert and Calm  Need for changes to sedation or analgesia regimen? No   D: Delirium CAM-ICU: Negative   E: Early Mobility  Plan for early mobility? Yes   F: Family Engagement Plan for family engagement today? Yes           Review of Invasive Devices:      Central access plan: Medications requiring central line      Prophylaxis:  VTE VTE covered by:  enoxaparin, Subcutaneous, 40 mg at 10/27/23 8885       Stress Ulcer  covered byfamotidine (PEPCID) tablet 20 mg [[id number]]       Subjective   HPI/24hr events: The patient was admitted on 9/3 for acute respiratory failure, with progressive throat tightness and difficulty swallowing. Status post trach on 9/17. Biopsy showed large B-cell lymphoma stage II status post chemo cycle 9/22  4 days of R-EPOCH. 9/27 Rituxan. 10/13 for [facility] cycle 2, which was done over 4 days. She received cytoxan on 10/19.    Per discussion with CM, New jannette require 72 hours off of ventilator and 50% FiO2 via trach collar for patient to be accepted. They have no issues regarding bringing patient in for chemotherapy She will be transferred to [facility] Monday at the earliest.    Overnight patient was needing high flow nasal cannula 50 L FiO2: 80 % fio2      Review of Systems - Patient refused to speak to me       Objective                            Vitals I/O      Most Recent Min/Max in 24hrs   Temp 98.3 °F (36.8 °C) Temp  Min: 98.1 °F (36.7 °C)  Max: 98.7 °F (37.1 °C)   Pulse 104 Pulse  Min: 79  Max: 109   Resp 21 Resp  Min: 14  Max: 38   /57 BP  Min: 84/52  Max: 117/60   O2 Sat 99 % SpO2  Min: 91 %  Max: 100 %      Intake/Output Summary (Last 24 hours) at 10/28/2023 0542  Last data filed at 10/28/2023 0503  Gross per 24 hour   Intake 120 ml   Output 800 ml   Net -680 ml         Diet Enteral/Parenteral; Tube Feeding with Oral Diet; Two Telly HN; Continuous; 38; 190; Water; Every 4 hours; Dysphagia/Modified Consistency; Dysphagia 1-Pureed; Honey Thick Liquid; Dysphagia 1-Pureed, Honey Thick Liquid     Invasive Monitoring Physical exam    Physical Exam  Skin:     Coloration: Skin is not jaundiced or pale. HENT:      Mouth/Throat:      Mouth: Mucous membranes are dry. Cardiovascular:      Rate and Rhythm: Normal rate and regular rhythm. Heart sounds: Normal heart sounds. Musculoskeletal:         General: No signs of injury. Right lower leg: No edema. Left lower leg: No edema. Abdominal:      Tenderness: There is no abdominal tenderness. There is no guarding. Constitutional:       Appearance: She is malnourished. She is ill-appearing. Comments: Patient is not in cardiorespiratory distress  Trach in place   Pulmonary:      Effort: No accessory muscle usage, respiratory distress or accessory muscle usage. Breath sounds: No wheezing or rales. Neurological:      Mental Status: She is oriented to person, place and time.       Motor: Strength full and intact in all extremities.             Diagnostic Studies      EKG: NSR  Imaging: N/A      Medications:  Scheduled PRN   acyclovir, 400 mg, BID  amLODIPine, 10 mg, Daily  busPIRone, 30 mg, TID  chlorhexidine, 15 mL, Q12H ASHLEY  enoxaparin, 40 mg, Q24H ASHLEY  famotidine, 20 mg, BID  fluconazole, 770 mg, Daily  folic acid, 1 mg, Daily  gabapentin, 300 mg, TID  levalbuterol, 1.25 mg, TID   And  ipratropium, 0.5 mg, TID  levofloxacin, 250 mg, Q24H ASHLEY  melatonin, 3 mg, HS  metoprolol tartrate, 25 mg, Q12H [address]  nicotine, 7 mg, Daily  oxyCODONE, 5 mg, Q8H  QUEtiapine, 50 mg, HS  senna, 8.8 mg, HS  sertraline, 75 mg, Daily  sodium chloride, 4 mL, TID  sulfamethoxazole-trimethoprim, 1 tablet, Once per day on Mon Wed Fri      alteplase, 2 mg, Q1MIN PRN  alteplase, 2 mg, Q1MIN PRN  ondansetron, 4 mg, Q8H PRN  oxyCODONE, 2.5 mg, Q6H PRN       Continuous          Labs:    CBC    Recent Labs     10/27/23  0453 10/28/23  0427   WBC 22.87* 27.89*   HGB 8.3* 8.4*   HCT 26.4* 26.7*    179     BMP    Recent Labs     10/27/23  0453 10/28/23  0427   SODIUM 138 138   K 5.0 5.1    100   CO2 31 32   AGAP 5 6   BUN 24 25   CREATININE 1.14 1.19   CALCIUM 10.0 10.0       Coags    No recent results     Additional Electrolytes  Recent Labs     10/27/23  0453   MG 2.1   PHOS 3.6          Blood Gas    No recent results  Recent Labs     10/27/23  0453   PHVEN 7.343   [id number] 62.7*   PO2VEN 34.7*   [id number] 33.3*   BEVEN 6.3    LFTs  No recent results    Infectious  No recent results  Glucose  Recent Labs     10/27/23  0453 10/28/23  0427   GLUC 121 125               Saroj Coker MD

## 2023-10-27 NOTE — OCCUPATIONAL THERAPY NOTE
Occupational Therapy Cancellation Note        Patient Name: Daya Harrison  QKEVW'S Date: 10/27/2023       10/27/23 1400   Note Type   Note type Cancelled Session   Cancel Reasons Refusal   Additional Comments Pt with active OT orders, goals . Attempted re-eval on this date. Pt refusing participation at this time despite max encouragement. Reports "I just need to relax, someone is in my room every 5 minutes".  Pt agreeable to OT session in the AM. Will attempt to f/u as schedule allows for completion of re-eval.     Zhen oRblero, OT

## 2023-10-27 NOTE — ASSESSMENT & PLAN NOTE
Lab Results   Component Value Date    EGFR 48 10/27/2023    EGFR 50 10/26/2023    EGFR 50 10/25/2023    CREATININE 1.14 10/27/2023    CREATININE 1.11 10/26/2023    CREATININE 1.11 10/25/2023     Baseline Cr between 0.75-1.1  Initial HERIBERTO felt 2/2 prerenal azotemia 2/2 diuresis, reduced PO intake +/- ATN. Patient received 2 doses of Bumex IV 2 g as she had not been responding appropriately to IV 40 Lasix daily. Creatinine went up by 0.5 meeting criteria for an HERIBERTO. This may be prerenal intrinsic or postrenal.  Potential prerenal causes include reduced kidney perfusion due to lisinopril. Intrinsic can also be lisinopril due to ATN, AIN from chemo medications, TLS, crystaline nephropathy from acyclovir, rituxan nephrotoxicity, filgrastim glomerulonephritis. Post renal obstructive could be from urine retention from vincristine or cyclophosphamide bladder fibrosis. Suspect most likely due to medications as a combination of prerenal and intrinsic. FENa was 0.2%, UA shows no casts or signs of infection, urine eosinophils 0%. Patient likely has prerenal HERIBERTO from volume depletion. Received 2 L NS and 1 pRBC and cr went up by 0.07 still and Na and Cl went down, suspect that volume prevented further cr rise and free water excretion impaired by kidney function, allowing hyponatremia to increase. Creatinine increase is likely plateaued and went down the following day. Suspect that now that nephrotoxic medications have been stopped she responded well to Lasix and creatinine downtrended. HERIBERTO now resolved. Will be cautious about nephrotoxins and monitor as restarting EPOCH and ppx. Patient gets volume depleted and overloaded very easily. Especially from chemo. 10/19 Heriberto as cr went up by 0.3 in 48 hours from 0.82 on 10/17 to 1.22 on 10/19. Suspect this is prerenal hypovolemic, will see if patient improves with fluids. She gets overloaded easily so if no improvement suspect CRS again.      Plan:   Continue to monitor UO/renal function.  Avoid nephrotoxic agents

## 2023-10-27 NOTE — ASSESSMENT & PLAN NOTE
Lab Results   Component Value Date    CHOLESTEROL 118 10/09/2023    CHOLESTEROL 203 (H) 01/24/2023    CHOLESTEROL 177 08/11/2022     Lab Results   Component Value Date    HDL 14 (L) 10/09/2023    HDL 45 (L) 01/24/2023    HDL 37 (L) 08/11/2022     Lab Results   Component Value Date    TRIG 144 10/09/2023    TRIG 166 (H) 09/16/2023    TRIG 160 (H) 01/24/2023     Lab Results   Component Value Date    NONHDLC 104 10/09/2023    [address] 146 07/12/2018    [address] 207 (H) 04/29/2013     Continue outpatient diet and lifestyle modification.

## 2023-10-27 NOTE — ASSESSMENT & PLAN NOTE
Large B-cell lymphoma, stage II. First chemo cycle 9/22 4 days of R-EPOCH. 9/27 Rituxan. 9/28 Filgrastim. Acyclovir, Zyloprim, Diflucan, Levaquin, Zofran, Bactrim for chemo prophylaxis, all discontinued with heme-onc's approval as Nafisa reached 4200. As per heme/onc attending pancytopenia is expected with her chemotherapy. Restarted all ppx 10/13 for [facility] cycle 2, which was done over 4 days. Ppx and filgrastim can be discontinued when patient is no longer neutropenic after this cycle again. Tunneled line in R IJ in place. She received cytoxan on 10/19. Neutropenic precautions will be needed in a few days    Plan:  Transfuse blood products as needed. Keep Hgb>7 and Plt>20 for chemo   Continue rituximab infusions as per heme/onc plan. Receiving daily filgrastim.   See acute respiratory failure above

## 2023-10-27 NOTE — ASSESSMENT & PLAN NOTE
Home regimen: Oxycodone 5 mg BID, Tylenol 650 mg q8 prn. Gabapentin 600 mg TID. Medical marijuana. Lumbar radiculopathy and impaired ambulatory dysfunction secondary to pain. Has bowel regimen and is having bowel movements daily. Patient required additional pain management during previous cycle and has some additional pain from tunneled line placement    Plan:  Continue gabapentin 300 mg TID, oxycodone 5 mg TID, prn Tylenol 650 mg q6.     Oxycodone 5mg every 8 hours  Oxycodone 2.5 mg q6 PRN

## 2023-10-27 NOTE — PLAN OF CARE
Problem: MOBILITY - ADULT  Goal: Maintain or return to baseline ADL function  Description: INTERVENTIONS:  -  Assess patient's ability to carry out ADLs; assess patient's baseline for ADL function and identify physical deficits which impact ability to perform ADLs (bathing, care of mouth/teeth, toileting, grooming, dressing, etc.)  - Assess/evaluate cause of self-care deficits   - Assess range of motion  - Assess patient's mobility; develop plan if impaired  - Assess patient's need for assistive devices and provide as appropriate  - Encourage maximum independence but intervene and supervise when necessary  - Involve family in performance of ADLs  - Assess for home care needs following discharge   - Consider OT consult to assist with ADL evaluation and planning for discharge  - Provide patient education as appropriate  Outcome: Progressing  Goal: Maintains/Returns to pre admission functional level  Description: INTERVENTIONS:  - Perform BMAT or MOVE assessment daily.   - Set and communicate daily mobility goal to care team and patient/family/caregiver. - Collaborate with rehabilitation services on mobility goals if consulted  - Perform Range of Motion times a day. - Reposition patient every  hours. - Dangle patient  times a day  - Stand patient  times a day  - Ambulate patient  times a day  - Out of bed to chair  times a day   - Out of bed for meals   Problem: Nutrition/Hydration-ADULT  Goal: Nutrient/Hydration intake appropriate for improving, restoring or maintaining nutritional needs  Description: Monitor and assess patient's nutrition/hydration status for malnutrition. Collaborate with interdisciplinary team and initiate plan and interventions as ordered. Monitor patient's weight and dietary intake as ordered or per policy. Utilize nutrition screening tool and intervene as necessary. Determine patient's food preferences and provide high-protein, high-caloric foods as appropriate.      INTERVENTIONS:  - Monitor oral intake, urinary output, labs, and treatment plans  - Assess nutrition and hydration status and recommend course of action  - Evaluate amount of meals eaten  - Assist patient with eating if necessary   - Allow adequate time for meals  - Recommend/ encourage appropriate diets, oral nutritional supplements, and vitamin/mineral supplements  - Order, calculate, and assess calorie counts as needed  - Recommend, monitor, and adjust tube feedings and TPN/PPN based on assessed needs  - Assess need for intravenous fluids  - Provide specific nutrition/hydration education as appropriate  - Include patient/family/caregiver in decisions related to nutrition  Outcome: Progressing     Problem: PAIN - ADULT  Goal: Verbalizes/displays adequate comfort level or baseline comfort level  Description: Interventions:  - Encourage patient to monitor pain and request assistance  - Assess pain using appropriate pain scale  - Administer analgesics based on type and severity of pain and evaluate response  - Implement non-pharmacological measures as appropriate and evaluate response  - Consider cultural and social influences on pain and pain management  - Notify physician/advanced practitioner if interventions unsuccessful or patient reports new pain  Outcome: Progressing     Problem: INFECTION - ADULT  Goal: Absence or prevention of progression during hospitalization  Description: INTERVENTIONS:  - Assess and monitor for signs and symptoms of infection  - Monitor lab/diagnostic results  - Monitor all insertion sites, i.e. indwelling lines, tubes, and drains  - Monitor endotracheal if appropriate and nasal secretions for changes in amount and color  - [place] appropriate cooling/warming therapies per order  - Administer medications as ordered  - Instruct and encourage patient and family to use good hand hygiene technique  - Identify and instruct in appropriate isolation precautions for identified infection/condition  Outcome: Progressing  Goal: Absence of fever/infection during neutropenic period  Description: INTERVENTIONS:  - Monitor WBC    Outcome: Progressing     Problem: SAFETY ADULT  Goal: Maintain or return to baseline ADL function  Description: INTERVENTIONS:  -  Assess patient's ability to carry out ADLs; assess patient's baseline for ADL function and identify physical deficits which impact ability to perform ADLs (bathing, care of mouth/teeth, toileting, grooming, dressing, etc.)  - Assess/evaluate cause of self-care deficits   - Assess range of motion  - Assess patient's mobility; develop plan if impaired  - Assess patient's need for assistive devices and provide as appropriate  - Encourage maximum independence but intervene and supervise when necessary  - Involve family in performance of ADLs  - Assess for home care needs following discharge   - Consider OT consult to assist with ADL evaluation and planning for discharge  - Provide patient education as appropriate  Outcome: Progressing  Goal: Maintains/Returns to pre admission functional level  Description: INTERVENTIONS:  - Perform BMAT or MOVE assessment daily.   - Set and communicate daily mobility goal to care team and patient/family/caregiver. - Collaborate with rehabilitation services on mobility goals if consulted  - Perform Range of Motion  times a day. - Reposition patient every  hours.   - Dangle patient  times a day  - Stand patient  times a day  - Ambulate patient  times a day  - Out of bed to chair  times a day   - Out of bed for meals  times a day  - Out of bed for toileting  - Record patient progress and toleration of activity level   Outcome: Progressing  Goal: Patient will remain free of falls  Description: INTERVENTIONS:  - Educate patient/family on patient safety including physical limitations  - Instruct patient to call for assistance with activity   - Consult OT/PT to assist with strengthening/mobility   - Keep Call bell within reach  - Keep bed low and locked with side rails adjusted as appropriate  - Keep care items and personal belongings within reach  - Initiate and maintain comfort rounds  - Make Fall Risk Sign visible to staff  - Offer Toileting every  Hours, in advance of need  - Initiate/Maintain alarm  - Obtain necessary fall risk management equipment:   - Apply yellow socks and bracelet for high fall risk patients  - Consider moving patient to room near nurses station  Outcome: Progressing    times a day  - Out of bed for toileting  - Record patient progress and toleration of activity level   Outcome: Progressing

## 2023-10-27 NOTE — PROGRESS NOTES
[facility]  Progress Note  Name: Mike Ardon  MRN: [id number]  Unit/Bed#: ICU 03 I Date of Admission: 9/13/2023   Date of Service: 10/27/2023 I Hospital Day: 40    Assessment/Plan   * Acute respiratory failure with hypoxia secondary to oropharyngeal mass  Assessment & Plan  Cuffless trach placed 9/17, transitioned to cuffed on 10/3. Oxygen requirements not improving. For mental health patient is on buspirone, quetiapine, and sertraline. For pain, gabapentin, oxycodone scheduled and prn, prn oxycodone mainly utilized for increased pain during chemo and after a bronch. On Guaifenesin, Atrovent and Xopenex for airway maintenance. No improvement in bilateral consolidation or atelectasis and groundglass opacities on repeat CT and chest x-rays. Patient not receiving proper positive pressure to open up atelectatic lung. Bronchial cx with 3 colonies CoNS. PCP PCR invalid, repeat negative. CTCAP indicates additional groundglass opacity with paraseptal emphysema, which may be contributing to inability to remain off vent. SBTs have been unsuccessful to place patient on trach collar since return to ICU. Patient tolerated high flow all day and overnight. Back on vent overnight, failed trach collar o/n. CXR 10/19 appears unchanged from 10/16 just not good breath during image, less of right lung visualized this time. Did well overnight on trach collar trial and today she is at 11 L. After 24 hours trial and patient not desaturating or become dyspneic, she was transferred to level 2 step down. She was desatting on HFNC and VBGs showed hypercarbia and hypoxia, due to which she was transferred to critical care.     Recent Labs     10/24/23  1127 10/25/23  1240 10/27/23  0453   PHART 7.385  --   --    MQX2JBH 49.5*  --   --    PO2ART 126.5  --   --    [id number] 29.0*  --   --    PHVEN  --    < > 7.343   [id number]  --    < > 62.7*   PO2VEN  --    < > 34.7*   [id number]  --    < > 33.3*   BEVEN  --    < > 6.3 SOURCE Radial, Right  --   --     < > = values in this interval not displayed. Plan:  Continue trach collar during the day and at night. Goal 350 TV, 12/6 pressure and PEEP 6. Goal FiO2 of 50%. Maintain cuff overnight from 9 pm to 6 am for positive pressure, can open cuff during the day for patient to speak. Discussed with CM. New jannette for STR, she will be able to come to the hospital for chemotherapy infusions and be admitted for 4 days every 3 weeks. Large cell lymphoma ([address])  Assessment & Plan  Large B-cell lymphoma, stage II. First chemo cycle 9/22 4 days of R-EPOCH. 9/27 Rituxan. 9/28 Filgrastim. Acyclovir, Zyloprim, Diflucan, Levaquin, Zofran, Bactrim for chemo prophylaxis, all discontinued with heme-onc's approval as Nafisa reached 4200. As per heme/onc attending pancytopenia is expected with her chemotherapy. Restarted all ppx 10/13 for [facility] cycle 2, which was done over 4 days. Ppx and filgrastim can be discontinued when patient is no longer neutropenic after this cycle again. Tunneled line in R IJ in place. She received cytoxan on 10/19. Neutropenic precautions will be needed in a few days    Plan:  Transfuse blood products as needed. Keep Hgb>7 and Plt>20 for chemo   Continue rituximab infusions as per heme/onc plan. Receiving daily filgrastim. See acute respiratory failure above    Oropharyngeal mass  Assessment & Plan  9/14 Neck/ soft tissue CT: Large enhancing soft tissue mass identified within the left neck involving multiple spaces. Centered within the left oropharynx with extensions as described above into multiple spaces. This results in significant airway compromise. 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 Small level 3/level 4 nodes are seen within the neck. Tracheostomy by ENT. Replaced on 9/26. H/o tobacco abuse, daily smoker. On nicotine while inpatient, confirmed with patient she needs nicotine patch. CT soft tissue neck shows reduced mass effect from 9/14 to 10/13. Can consider reducing trach size if continues to improve    Plan:  ENT and heme onc following  Will titrate NRT weekly  As per speech therapist recommendations she will continue puree foods. Not ready to advance diet. Continue PEG tube feeds for nutrition. See acute respiratory failure and large B cell lymphoma above      Pancytopenia due to chemotherapy [facility])  Assessment & Plan  Patient received 1 PRBC transfusion on 10/5. Hemoglobin 8.4 s/p 1 PRBC transfusion on 10/25. B/L is 12-14. No sites of bleeding, no black stools. Iron panel indicative of inflammatory anemia. Normal Vitamin B12 levels, negative hemolysis smear. Folate is low 5.5. Plan:  Trend hemoglobin and transfuse for <7. As per heme/onc transfuse irradiated and leukoreduced blood products. Trend platelets  Folic acid 1mg daily supplementation  As per my conversation with Heme/oncology attending, patient is expected to have chemotherapy associated pancytopenia. No further anemia w/u required. RML pneumonia-resolved as of 9/22/2023  Assessment & Plan  9/13 CT PE study: Patchy consolidation right middle lobe, new from 8/25/2023, compatible with pneumonia. No leukocytosis, no fever/chills. Positive for sore throat, cough. New onset pneumonia after recent hospitalization and antibiotics treatment. 9/14: Bronchoscopy/ ABL. MRSA negative. ABL revealed 2+ Growth of Candida albicans, suspected contaminant. PO (acute kidney injury) (HCC)-resolved as of 9/21/2023  Assessment & Plan  Lab Results   Component Value Date    CREATININE 1.14 10/27/2023    CREATININE 1.11 10/26/2023    CREATININE 1.11 10/25/2023       Likely prerenal.  Second to poor oral intake versus poor urine output. Baseline creatinine 1 to 1.2.     Cr at baseline  Plan:  Urinary retention protocol, Daily weights, I/O  Trend Cr daily    (HFpEF) heart failure with preserved ejection fraction [facility])  Assessment & Plan  Wt Readings from Last 3 Encounters:   10/27/23 72.8 kg (160 lb 7.9 oz)   09/07/23 74.6 kg (164 lb 6.4 oz)   08/30/23 73.3 kg (161 lb 9.6 oz)     Lab Results   Component Value Date    LVEF 60 08/28/2023     (H) 09/29/2023     (H) 09/13/2023     Echo 8/28/23: LVEF 60%. Plan:  K > 4   Measure I/O      Angioedema  Assessment & Plan  POA in [place] ([place] ED: Swollen tongue, soft and hard palate with severe angioedema. Decadron 10 mg, Benadryl 25 mg given. 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 Plan:  Cuffless trach 9/17, cuffed Shiley XLT #7 10/3  See acute respiratory failure and oropharyngeal mass above  Continue to wean vent. Ambulatory dysfunction  Assessment & Plan  2/2 Impacted by chronic pain syndrome + prolonged hospital stay. Continue OOB with PT. PT rec post acute rehab however reportedly Cannot get LTACH placement while getting chemo per CM  She is aware STR SNFs continue to follow and treatment can be done from a SNF level of care. PT would need to be on trach collar for at least 48 hours with no need for the vent. Aware that pt would need to be around 28% FiO2     Pain syndrome, chronic  Assessment & Plan  Home regimen: Oxycodone 5 mg BID, Tylenol 650 mg q8 prn. Gabapentin 600 mg TID. Medical marijuana. Lumbar radiculopathy and impaired ambulatory dysfunction secondary to pain. Has bowel regimen and is having bowel movements daily. Patient required additional pain management during previous cycle and has some additional pain from tunneled line placement    Plan:  Continue gabapentin 300 mg TID, oxycodone 5 mg TID, prn Tylenol 650 mg q6. Oxycodone 5mg every 8 hours  Oxycodone 2.5 mg q6 PRN    Benign essential hypertension  Assessment & Plan  Home regimen Coreg 12.5 mg BID, Amlodipine 10 mg daily, and lisinopril 40 mg. All discontinued at this time secondary to hypotension and PO since cycle 1. but stable currently without any antihypertensives.  Systolic persistently elevated and tachycardic, potentially secondary to pain. Patient was more hypotensive during last chemo. Coreg contraindicated during chemo, since cycles are every other week, would be better to stick with lopressor during duration of therapy as opposed to switching constantly. Plan:  Monitor vitals. Continue Norvasc 10 and lopressor 25 bid  Prn hydralazine for SBP > 160    Hyperlipidemia-resolved as of 10/26/2023  Assessment & Plan  Lab Results   Component Value Date    CHOLESTEROL 118 10/09/2023    CHOLESTEROL 203 (H) 01/24/2023    CHOLESTEROL 177 08/11/2022     Lab Results   Component Value Date    HDL 14 (L) 10/09/2023    HDL 45 (L) 01/24/2023    HDL 37 (L) 08/11/2022     Lab Results   Component Value Date    TRIG 144 10/09/2023    TRIG 166 (H) 09/16/2023    TRIG 160 (H) 01/24/2023     Lab Results   Component Value Date    NONHDLC 104 10/09/2023    [address] 146 07/12/2018    [address] 207 (H) 04/29/2013     Continue outpatient diet and lifestyle modification. Depression  Assessment & Plan  Patient on Zoloft 75 daily and Seroquel hs  Patient is depressed, multiple family/palliative discussions. patient is firm that she wants to live and to fight, she is just tired. Currently goal is disease treatment oriented. Full code. No SI/HI ideations. Palliative signed off, will reconsult if patient changes her mind regarding wishes. On 10/20/23 she decided to leave A. Now waiting for CM and machine     Generalized abdominal pain  Assessment & Plan  Patient reports abdominal pain which is unchanged since 9/22 no guarding on exam. Takes Famotidine for GERD at home. Alk phos 10/2 145, 10/20 79. CTCAP reveals complex right upper pole renal lesion requiring possible outpatient MRI    Continue Famotidine  On bowel regimen with Senna and Miralax prn    Chronic Superficial thrombophlebitis  Assessment & Plan  Right forearm soreness. RUE US: No acute or chronic deep vein thrombosis. 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  PO not severe enough to require renal dosing at this time, will continue to monitor and adjust dosing as needed. Continue DVT ppx with Lovenox 40    Blister (nonthermal) of left forearm, sequela  Assessment & Plan  Blisters of left upper extremity healing, no signs of infection, continue daily wound care. CKD (chronic kidney disease) stage 3, GFR 30-59 ml/min [facility])  Assessment & Plan  Lab Results   Component Value Date    EGFR 48 10/27/2023    EGFR 50 10/26/2023    EGFR 50 10/25/2023    CREATININE 1.14 10/27/2023    CREATININE 1.11 10/26/2023    CREATININE 1.11 10/25/2023     Baseline Cr between 0.75-1.1  Initial PO felt 2/2 prerenal azotemia 2/2 diuresis, reduced PO intake +/- ATN. Patient received 2 doses of Bumex IV 2 g as she had not been responding appropriately to IV 40 Lasix daily. Creatinine went up by 0.5 meeting criteria for an PO. This may be prerenal intrinsic or postrenal.  Potential prerenal causes include reduced kidney perfusion due to lisinopril. Intrinsic can also be lisinopril due to ATN, AIN from chemo medications, TLS, crystaline nephropathy from acyclovir, rituxan nephrotoxicity, filgrastim glomerulonephritis. Post renal obstructive could be from urine retention from vincristine or cyclophosphamide bladder fibrosis. Suspect most likely due to medications as a combination of prerenal and intrinsic. FENa was 0.2%, UA shows no casts or signs of infection, urine eosinophils 0%. Patient likely has prerenal PO from volume depletion. Received 2 L NS and 1 pRBC and cr went up by 0.07 still and Na and Cl went down, suspect that volume prevented further cr rise and free water excretion impaired by kidney function, allowing hyponatremia to increase. Creatinine increase is likely plateaued and went down the following day. Suspect that now that nephrotoxic medications have been stopped she responded well to Lasix and creatinine downtrended. PO now resolved.  Will be cautious about nephrotoxins and monitor as restarting EPOCH and ppx. Patient gets volume depleted and overloaded very easily. Especially from chemo. 10/19 Heriberto as cr went up by 0.3 in 48 hours from 0.82 on 10/17 to 1.22 on 10/19. Suspect this is prerenal hypovolemic, will see if patient improves with fluids. She gets overloaded easily so if no improvement suspect CRS again. Plan:   Continue to monitor UO/renal function. Avoid nephrotoxic agents    COPD without exacerbation (HCC)-resolved as of 9/26/2023  Assessment & Plan  Continue vent with nebs. Wean off vent. Encephalopathy-resolved as of 9/21/2023  Assessment & Plan  9/19- Pt appeared to be AAO x3. Improving. ICU Core Measures     Vented Patient  VAP Bundle  VAP bundle ordered     A: Assess, Prevent, and Manage Pain Has pain been assessed? Yes  Need for changes to pain regimen? No   B: Both Spontaneous Awakening Trials (SATs) and Spontaneous Breathing Trials (SBTs) Plan to perform spontaneous awakening trial today? N/A      C: Choice of Sedation RASS Goal: 0 Alert and Calm  Need for changes to sedation or analgesia regimen? No   D: Delirium CAM-ICU: Negative   E: Early Mobility  Plan for early mobility? Yes   F: Family Engagement Plan for family engagement today? Yes           Review of Invasive Devices:      Central access plan: Medications requiring central line      Prophylaxis:  VTE VTE covered by:  enoxaparin, Subcutaneous, 40 mg at 10/27/23 3075       Stress Ulcer  covered byfamotidine (PEPCID) tablet 20 mg [[id number]]       Subjective   HPI/24hr events: FiO2 was reduced to 70% on trach collar. She did not require vent overnight. Patient refused suctioning. She refused for removal of passing mirror. She was placed back on 100% FiO2 overnight. She is in a pleasant mood today. Discussed with patient regarding the respiratory goals and STR placement. She understands the plan is agreeable to work with respiratory therapists to allow for suctioning.     Review of Systems Constitutional:  Negative for chills and fever. HENT:  Negative for ear pain and sore throat. Eyes:  Negative for pain and visual disturbance. Respiratory:  Negative for cough and shortness of breath. Cardiovascular:  Negative for chest pain and palpitations. Gastrointestinal:  Positive for abdominal pain. Negative for vomiting. Genitourinary:  Negative for dysuria and hematuria. Musculoskeletal:  Negative for arthralgias and back pain. Skin:  Negative for color change and rash. Neurological:  Negative for seizures and syncope. All other systems reviewed and are negative. Objective                            Vitals I/O      Most Recent Min/Max in 24hrs   Temp 98.7 °F (37.1 °C) Temp  Min: 97.8 °F (36.6 °C)  Max: 99.4 °F (37.4 °C)   Pulse (!) 108 Pulse  Min: 92  Max: 108   Resp 16 Resp  Min: 16  Max: 39   /62 BP  Min: 107/75  Max: 136/67   O2 Sat 100 % SpO2  Min: 85 %  Max: 100 %      Intake/Output Summary (Last 24 hours) at 10/27/2023 1039  Last data filed at 10/27/2023 0993  Gross per 24 hour   Intake 310 ml   Output 1950 ml   Net -1640 ml         Diet Enteral/Parenteral; Tube Feeding with Oral Diet; Two Telly HN; Continuous; 38; 190; Water; Every 4 hours; Dysphagia/Modified Consistency; Dysphagia 1-Pureed; Honey Thick Liquid; Dysphagia 1-Pureed, Honey Thick Liquid     Invasive Monitoring Physical exam    Physical Exam  Eyes:      Pupils: Pupils are equal, round, and reactive to light. HENT:      Head: Normocephalic and atraumatic. Nose: No congestion. Neck:      Comments: Trach collar  Cardiovascular:      Rate and Rhythm: Normal rate and regular rhythm. Abdominal:      General: There is no distension. Comments: PEG intact for Tube Feedings    Constitutional:       Comments: Sitting in bed upright   Pulmonary:      Effort: Pulmonary effort is normal.      Breath sounds: Normal breath sounds. Neurological:      General: No focal deficit present.       Mental Status: She is alert and oriented to person, place and time.             Diagnostic Studies      EKG: NSR  Imaging: N/A      Medications:  Scheduled PRN   acyclovir, 400 mg, BID  amLODIPine, 10 mg, Daily  busPIRone, 30 mg, TID  chlorhexidine, 15 mL, Q12H ASHLEY  enoxaparin, 40 mg, Q24H ASHLEY  famotidine, 20 mg, BID  fluconazole, 832 mg, Daily  folic acid, 1 mg, Daily  gabapentin, 300 mg, TID  levalbuterol, 1.25 mg, TID   And  ipratropium, 0.5 mg, TID  levofloxacin, 250 mg, Q24H ASHLEY  melatonin, 3 mg, HS  metoprolol tartrate, 25 mg, Q12H [facility]  nicotine, 7 mg, Daily  oxyCODONE, 5 mg, Q8H  QUEtiapine, 50 mg, HS  senna, 8.8 mg, HS  sertraline, 75 mg, Daily  sulfamethoxazole-trimethoprim, 1 tablet, Once per day on Mon Wed Fri      acetaminophen, 650 mg, Q6H PRN  alteplase, 2 mg, Q1MIN PRN  alteplase, 2 mg, Q1MIN PRN  guaiFENesin, 200 mg, Q6H PRN  hydrALAZINE, 5 mg, Q6H PRN  ondansetron, 4 mg, Q8H PRN  ondansetron, 4 mg, Q8H PRN  oxyCODONE, 2.5 mg, Q6H PRN  polyethylene glycol, 17 g, Daily PRN  sodium chloride, 4 mL, Q6H PRN       Continuous          Labs:    CBC    Recent Labs     10/26/23  0357 10/27/23  0453   WBC 8.02  8.02 22.87*   HGB 8.4*  8.4* 8.3*   HCT 26.8*  26.8* 26.4*     255 209   BANDSPCT 23*  --      BMP    Recent Labs     10/26/23  0357 10/27/23  0453   SODIUM 138 138   K 5.0 5.0    102   CO2 30 31   AGAP 6 5   BUN 26* 24   CREATININE 1.11 1.14   CALCIUM 9.7 10.0       Coags    No recent results     Additional Electrolytes  Recent Labs     10/26/23  0357 10/27/23  0453   MG 2.1 2.1   PHOS 3.3 3.6          Blood Gas    No recent results  Recent Labs     10/27/23  0453   PHVEN 7.343   [id number] 62.7*   PO2VEN 34.7*   HHQ4SHD 33.3*   BEVEN 6.3    LFTs  No recent results    Infectious  No recent results  Glucose  Recent Labs     10/26/23  0357 10/27/23  0453   GLUC 125 121               Jose Alfredo Sosa MD

## 2023-10-27 NOTE — ASSESSMENT & PLAN NOTE
Wt Readings from Last 3 Encounters:   10/27/23 72.8 kg (160 lb 7.9 oz)   09/07/23 74.6 kg (164 lb 6.4 oz)   08/30/23 73.3 kg (161 lb 9.6 oz)     Lab Results   Component Value Date    LVEF 60 08/28/2023     (H) 09/29/2023     (H) 09/13/2023     Echo 8/28/23: LVEF 60%.        Plan:  K > 4   Measure I/O

## 2023-10-27 NOTE — ASSESSMENT & PLAN NOTE
POA in [facility] ED: Swollen tongue, soft and hard palate with severe angioedema. Decadron 10 mg, Benadryl 25 mg given. 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 Plan:  Cuffless trach 9/17, cuffed Shiley XLT #7 10/3  See acute respiratory failure and oropharyngeal mass above  Continue to wean vent.

## 2023-10-27 NOTE — ASSESSMENT & PLAN NOTE
Cuffless trach placed 9/17, transitioned to cuffed on 10/3. Oxygen requirements not improving. For mental health patient is on buspirone, quetiapine, and sertraline. For pain, gabapentin, oxycodone scheduled and prn, prn oxycodone mainly utilized for increased pain during chemo and after a bronch. On Guaifenesin, Atrovent and Xopenex for airway maintenance. No improvement in bilateral consolidation or atelectasis and groundglass opacities on repeat CT and chest x-rays. Patient not receiving proper positive pressure to open up atelectatic lung. Bronchial cx with 3 colonies CoNS. PCP PCR invalid, repeat negative. CTCAP indicates additional groundglass opacity with paraseptal emphysema, which may be contributing to inability to remain off vent. SBTs have been unsuccessful to place patient on trach collar since return to ICU. Patient tolerated high flow all day and overnight. Back on vent overnight, failed trach collar o/n. CXR 10/19 appears unchanged from 10/16 just not good breath during image, less of right lung visualized this time. Did well overnight on trach collar trial and today she is at 11 L. After 24 hours trial and patient not desaturating or become dyspneic, she was transferred to level 2 step down. She was desatting on HFNC and VBGs showed hypercarbia and hypoxia, due to which she was transferred to critical care. Recent Labs     10/24/23  1127 10/25/23  1240 10/27/23  0453   PHART 7.385  --   --    LYA8EFV 49.5*  --   --    PO2ART 126.5  --   --    MZJ1LLC 29.0*  --   --    PHVEN  --    < > 7.343   [id number]  --    < > 62.7*   PO2VEN  --    < > 34.7*   [id number]  --    < > 33.3*   BEVEN  --    < > 6.3   SOURCE Radial, Right  --   --     < > = values in this interval not displayed. Plan:  Continue trach collar during the day and at night. Goal 350 TV, 12/6 pressure and PEEP 6. Goal FiO2 of 50%.   Maintain cuff overnight from 9 pm to 6 am for positive pressure, can open cuff during the day for patient to speak. Discussed with CM. New jannette for STR, she will be able to come to the hospital for chemotherapy infusions and be admitted for 4 days every 3 weeks.

## 2023-10-27 NOTE — ASSESSMENT & PLAN NOTE
Patient received 1 PRBC transfusion on 10/5. Hemoglobin 8.4 s/p 1 PRBC transfusion on 10/25. B/L is 12-14. No sites of bleeding, no black stools. Iron panel indicative of inflammatory anemia. Normal Vitamin B12 levels, negative hemolysis smear. Folate is low 5.5. Plan:  Trend hemoglobin and transfuse for <7. As per heme/onc transfuse irradiated and leukoreduced blood products. Trend platelets  Folic acid 1mg daily supplementation  As per my conversation with Heme/oncology attending, patient is expected to have chemotherapy associated pancytopenia. No further anemia w/u required.

## 2023-10-27 NOTE — ASSESSMENT & PLAN NOTE
Lab Results   Component Value Date    CREATININE 1.14 10/27/2023    CREATININE 1.11 10/26/2023    CREATININE 1.11 10/25/2023       Likely prerenal.  Second to poor oral intake versus poor urine output. Baseline creatinine 1 to 1.2.     Cr at baseline  Plan:  Urinary retention protocol, Daily weights, I/O  Trend Cr daily

## 2023-10-28 LAB
ANION GAP SERPL CALCULATED.3IONS-SCNC: 6 MMOL/L
BASE EX.OXY STD BLDV CALC-SCNC: 54.9 % (ref 60–80)
BASE EXCESS BLDV CALC-SCNC: 5.3 MMOL/L
BNP SERPL-MCNC: 312 PG/ML (ref 0–100)
BUN SERPL-MCNC: 25 MG/DL (ref 5–25)
CALCIUM SERPL-MCNC: 10 MG/DL (ref 8.4–10.2)
CHLORIDE SERPL-SCNC: 100 MMOL/L (ref 96–108)
CO2 SERPL-SCNC: 32 MMOL/L (ref 21–32)
CREAT SERPL-MCNC: 1.19 MG/DL (ref 0.6–1.3)
ERYTHROCYTE [DISTWIDTH] IN BLOOD BY AUTOMATED COUNT: 16 % (ref 11.6–15.1)
GFR SERPL CREATININE-BSD FRML MDRD: 46 ML/MIN/1.73SQ M
GLUCOSE SERPL-MCNC: 125 MG/DL (ref 65–140)
HCO3 BLDV-SCNC: 31.9 MMOL/L (ref 24–30)
HCT VFR BLD AUTO: 26.7 % (ref 34.8–46.1)
HFNC FLOW LPM: 45
HGB BLD-MCNC: 8.4 G/DL (ref 11.5–15.4)
MCH RBC QN AUTO: 31.6 PG (ref 26.8–34.3)
MCHC RBC AUTO-ENTMCNC: 31.5 G/DL (ref 31.4–37.4)
MCV RBC AUTO: 100 FL (ref 82–98)
NON VENT HFNC FIO2: 80
NON VENT TYPE HFNC: ABNORMAL
O2 CT BLDV-SCNC: 6.9 ML/DL
PCO2 BLDV: 59.6 MM HG (ref 42–50)
PH BLDV: 7.35 [PH] (ref 7.3–7.4)
PLATELET # BLD AUTO: 179 THOUSANDS/UL (ref 149–390)
PMV BLD AUTO: 9.9 FL (ref 8.9–12.7)
PO2 BLDV: 31.2 MM HG (ref 35–45)
POTASSIUM SERPL-SCNC: 5.1 MMOL/L (ref 3.5–5.3)
PROCALCITONIN SERPL-MCNC: 0.29 NG/ML
RBC # BLD AUTO: 2.66 MILLION/UL (ref 3.81–5.12)
SODIUM SERPL-SCNC: 138 MMOL/L (ref 135–147)
WBC # BLD AUTO: 27.89 THOUSAND/UL (ref 4.31–10.16)

## 2023-10-28 PROCEDURE — 83880 ASSAY OF NATRIURETIC PEPTIDE: CPT

## 2023-10-28 PROCEDURE — 94760 N-INVAS EAR/PLS OXIMETRY 1: CPT

## 2023-10-28 PROCEDURE — 80048 BASIC METABOLIC PNL TOTAL CA: CPT

## 2023-10-28 PROCEDURE — 99291 CRITICAL CARE FIRST HOUR: CPT | Performed by: INTERNAL MEDICINE

## 2023-10-28 PROCEDURE — 87205 SMEAR GRAM STAIN: CPT

## 2023-10-28 PROCEDURE — 85027 COMPLETE CBC AUTOMATED: CPT

## 2023-10-28 PROCEDURE — 84145 PROCALCITONIN (PCT): CPT

## 2023-10-28 PROCEDURE — 94640 AIRWAY INHALATION TREATMENT: CPT

## 2023-10-28 PROCEDURE — 82805 BLOOD GASES W/O2 SATURATION: CPT

## 2023-10-28 PROCEDURE — 87070 CULTURE OTHR SPECIMN AEROBIC: CPT

## 2023-10-28 RX ORDER — FUROSEMIDE 10 MG/ML
40 INJECTION INTRAMUSCULAR; INTRAVENOUS ONCE
Status: COMPLETED | OUTPATIENT
Start: 2023-10-28 | End: 2023-10-28

## 2023-10-28 RX ADMIN — SERTRALINE 75 MG: 25 TABLET, FILM COATED ORAL at 08:02

## 2023-10-28 RX ADMIN — SODIUM CHLORIDE SOLN NEBU 3% 4 ML: 3 NEBU SOLN at 09:13

## 2023-10-28 RX ADMIN — SODIUM CHLORIDE SOLN NEBU 3% 4 ML: 3 NEBU SOLN at 20:33

## 2023-10-28 RX ADMIN — LEVALBUTEROL HYDROCHLORIDE 1.25 MG: 1.25 SOLUTION RESPIRATORY (INHALATION) at 09:13

## 2023-10-28 RX ADMIN — IPRATROPIUM BROMIDE 0.5 MG: 0.5 SOLUTION RESPIRATORY (INHALATION) at 13:46

## 2023-10-28 RX ADMIN — MELATONIN 3 MG: at 21:43

## 2023-10-28 RX ADMIN — FAMOTIDINE 20 MG: 20 TABLET, FILM COATED ORAL at 08:02

## 2023-10-28 RX ADMIN — OXYCODONE HYDROCHLORIDE 5 MG: 5 SOLUTION ORAL at 04:21

## 2023-10-28 RX ADMIN — GABAPENTIN 300 MG: 300 CAPSULE ORAL at 15:46

## 2023-10-28 RX ADMIN — ACYCLOVIR 400 MG: 200 CAPSULE ORAL at 18:09

## 2023-10-28 RX ADMIN — NICOTINE 7 MG: 7 PATCH, EXTENDED RELEASE TRANSDERMAL at 08:02

## 2023-10-28 RX ADMIN — IPRATROPIUM BROMIDE 0.5 MG: 0.5 SOLUTION RESPIRATORY (INHALATION) at 20:33

## 2023-10-28 RX ADMIN — CHLORHEXIDINE GLUCONATE 15 ML: 1.2 SOLUTION ORAL at 21:45

## 2023-10-28 RX ADMIN — FAMOTIDINE 20 MG: 20 TABLET, FILM COATED ORAL at 18:10

## 2023-10-28 RX ADMIN — OXYCODONE HYDROCHLORIDE 2.5 MG: 5 SOLUTION ORAL at 08:03

## 2023-10-28 RX ADMIN — ENOXAPARIN SODIUM 40 MG: 40 INJECTION SUBCUTANEOUS at 08:02

## 2023-10-28 RX ADMIN — GABAPENTIN 300 MG: 300 CAPSULE ORAL at 21:43

## 2023-10-28 RX ADMIN — BUSPIRONE HYDROCHLORIDE 30 MG: 10 TABLET ORAL at 16:18

## 2023-10-28 RX ADMIN — ACYCLOVIR 400 MG: 200 CAPSULE ORAL at 08:08

## 2023-10-28 RX ADMIN — LEVALBUTEROL HYDROCHLORIDE 1.25 MG: 1.25 SOLUTION RESPIRATORY (INHALATION) at 13:46

## 2023-10-28 RX ADMIN — FLUCONAZOLE 200 MG: 100 TABLET ORAL at 08:03

## 2023-10-28 RX ADMIN — OXYCODONE HYDROCHLORIDE 5 MG: 5 SOLUTION ORAL at 11:36

## 2023-10-28 RX ADMIN — LEVALBUTEROL HYDROCHLORIDE 1.25 MG: 1.25 SOLUTION RESPIRATORY (INHALATION) at 20:33

## 2023-10-28 RX ADMIN — FUROSEMIDE 40 MG: 10 INJECTION, SOLUTION INTRAMUSCULAR; INTRAVENOUS at 15:46

## 2023-10-28 RX ADMIN — QUETIAPINE FUMARATE 50 MG: 25 TABLET ORAL at 21:43

## 2023-10-28 RX ADMIN — BUSPIRONE HYDROCHLORIDE 30 MG: 10 TABLET ORAL at 21:43

## 2023-10-28 RX ADMIN — SODIUM CHLORIDE SOLN NEBU 3% 4 ML: 3 NEBU SOLN at 13:46

## 2023-10-28 RX ADMIN — OXYCODONE HYDROCHLORIDE 5 MG: 5 SOLUTION ORAL at 21:47

## 2023-10-28 RX ADMIN — FOLIC ACID 1 MG: 1 TABLET ORAL at 08:02

## 2023-10-28 RX ADMIN — BUSPIRONE HYDROCHLORIDE 30 MG: 10 TABLET ORAL at 08:17

## 2023-10-28 RX ADMIN — GABAPENTIN 300 MG: 300 CAPSULE ORAL at 08:03

## 2023-10-28 RX ADMIN — LEVOFLOXACIN 250 MG: 250 TABLET, FILM COATED ORAL at 08:08

## 2023-10-28 RX ADMIN — CHLORHEXIDINE GLUCONATE 15 ML: 1.2 SOLUTION ORAL at 08:02

## 2023-10-28 RX ADMIN — IPRATROPIUM BROMIDE 0.5 MG: 0.5 SOLUTION RESPIRATORY (INHALATION) at 09:13

## 2023-10-28 RX ADMIN — METOPROLOL TARTRATE 25 MG: 25 TABLET, FILM COATED ORAL at 21:43

## 2023-10-28 NOTE — ASSESSMENT & PLAN NOTE
POA in [facility] ED: Swollen tongue, soft and hard palate with severe angioedema. Decadron 10 mg, Benadryl 25 mg given. 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 Plan:  Cuffless trach 9/17, cuffed Shiley XLT #7 10/3  See acute respiratory failure and oropharyngeal mass above  Continue to wean vent.

## 2023-10-28 NOTE — ASSESSMENT & PLAN NOTE
Patient received 1 PRBC transfusion on 10/5. Hemoglobin 8.2 s/p 1 PRBC transfusion on 10/25. B/L is 12-14. No sites of bleeding, no black stools. Iron panel indicative of inflammatory anemia. Normal Vitamin B12 levels, negative hemolysis smear. Folate is low 5.5. Plan:  Trend hemoglobin and transfuse for <7. As per heme/onc transfuse irradiated and leukoreduced blood products. Trend platelets  Folic acid 1mg daily supplementation  As per my conversation with Heme/oncology attending, patient is expected to have chemotherapy associated pancytopenia. No further anemia w/u required.

## 2023-10-28 NOTE — ASSESSMENT & PLAN NOTE
Large B-cell lymphoma, stage II. First chemo cycle 9/22 4 days of R-EPOCH. 9/27 Rituxan. 9/28 Filgrastim. Acyclovir, Zyloprim, Diflucan, Levaquin, Zofran, Bactrim for chemo prophylaxis, all discontinued with heme-onc's approval as Nafisa reached 4200. As per heme/onc attending pancytopenia is expected with her chemotherapy. Restarted all ppx 10/13 for [facility] cycle 2, which was done over 4 days. Ppx and filgrastim can be discontinued when patient is no longer neutropenic after this cycle again. Tunneled line in R IJ in place. She received cytoxan on 10/19. Neutropenic precautions will be needed in a few days    Plan:  Transfuse blood products as needed. Keep Hgb>7 and Plt>20 for chemo   Continue rituximab infusions as per heme/onc plan. Receiving daily filgrastim.   See acute respiratory failure above

## 2023-10-28 NOTE — PLAN OF CARE
Problem: RESPIRATORY - ADULT  Goal: Achieves optimal ventilation and oxygenation  Description: INTERVENTIONS:  - Assess for changes in respiratory status  - Assess for changes in mentation and behavior  - Position to facilitate oxygenation and minimize respiratory effort  - Oxygen administered by appropriate delivery if ordered  - Initiate smoking cessation education as indicated  - Encourage broncho-pulmonary hygiene including cough, deep breathe, Incentive Spirometry  - Assess the need for suctioning and aspirate as needed  - Assess and instruct to report SOB or any respiratory difficulty  - Respiratory Therapy support as indicated  Outcome: Progressing     Problem: PAIN - ADULT  Goal: Verbalizes/displays adequate comfort level or baseline comfort level  Description: Interventions:  - Encourage patient to monitor pain and request assistance  - Assess pain using appropriate pain scale  - Administer analgesics based on type and severity of pain and evaluate response  - Implement non-pharmacological measures as appropriate and evaluate response  - Consider cultural and social influences on pain and pain management  - Notify physician/advanced practitioner if interventions unsuccessful or patient reports new pain  Outcome: Progressing     Problem: METABOLIC, FLUID AND ELECTROLYTES - ADULT  Goal: Fluid balance maintained  Description: INTERVENTIONS:  - Monitor labs   - Monitor I/O and WT  - Instruct patient on fluid and nutrition as appropriate  - Assess for signs & symptoms of volume excess or deficit  Outcome: Progressing     Problem: METABOLIC, FLUID AND ELECTROLYTES - ADULT  Goal: Electrolytes maintained within normal limits  Description: INTERVENTIONS:  - Monitor labs and assess patient for signs and symptoms of electrolyte imbalances  - Administer electrolyte replacement as ordered  - Monitor response to electrolyte replacements, including repeat lab results as appropriate  - Instruct patient on fluid and nutrition as appropriate  Outcome: Progressing     Problem: GENITOURINARY - ADULT  Goal: Maintains or returns to baseline urinary function  Description: INTERVENTIONS:  - Assess urinary function  - Encourage oral fluids to ensure adequate hydration if ordered  - Administer IV fluids as ordered to ensure adequate hydration  - Administer ordered medications as needed  - Offer frequent toileting  - Follow urinary retention protocol if ordered  Outcome: Progressing     Problem: SAFETY ADULT  Goal: Maintain or return to baseline ADL function  Description: INTERVENTIONS:  -  Assess patient's ability to carry out ADLs; assess patient's baseline for ADL function and identify physical deficits which impact ability to perform ADLs (bathing, care of mouth/teeth, toileting, grooming, dressing, etc.)  - Assess/evaluate cause of self-care deficits   - Assess range of motion  - Assess patient's mobility; develop plan if impaired  - Assess patient's need for assistive devices and provide as appropriate  - Encourage maximum independence but intervene and supervise when necessary  - Involve family in performance of ADLs  - Assess for home care needs following discharge   - Consider OT consult to assist with ADL evaluation and planning for discharge  - Provide patient education as appropriate  Outcome: Progressing     Problem: Nutrition/Hydration-ADULT  Goal: Nutrient/Hydration intake appropriate for improving, restoring or maintaining nutritional needs  Description: Monitor and assess patient's nutrition/hydration status for malnutrition. Collaborate with interdisciplinary team and initiate plan and interventions as ordered. Monitor patient's weight and dietary intake as ordered or per policy. Utilize nutrition screening tool and intervene as necessary. Determine patient's food preferences and provide high-protein, high-caloric foods as appropriate.      INTERVENTIONS:  - Monitor oral intake, urinary output, labs, and treatment plans  - Assess nutrition and hydration status and recommend course of action  - Evaluate amount of meals eaten  - Assist patient with eating if necessary   - Allow adequate time for meals  - Recommend/ encourage appropriate diets, oral nutritional supplements, and vitamin/mineral supplements  - Order, calculate, and assess calorie counts as needed  - Recommend, monitor, and adjust tube feedings and TPN/PPN based on assessed needs  - Assess need for intravenous fluids  - Provide specific nutrition/hydration education as appropriate  - Include patient/family/caregiver in decisions related to nutrition  Outcome: Progressing

## 2023-10-28 NOTE — ASSESSMENT & PLAN NOTE
2/2 Impacted by chronic pain syndrome + prolonged hospital stay. Continue OOB with PT. PT rec post acute rehab however reportedly Cannot get LTACH placement while getting chemo per CM  She is aware STR SNFs continue to follow and treatment can be done from a SNF level of care. PT would need to be on trach collar for at least 72 hours with no need for the vent.  Aware that pt would need to be around 28% FiO2

## 2023-10-28 NOTE — ASSESSMENT & PLAN NOTE
Lab Results   Component Value Date    EGFR 46 10/28/2023    EGFR 48 10/27/2023    EGFR 50 10/26/2023    CREATININE 1.19 10/28/2023    CREATININE 1.14 10/27/2023    CREATININE 1.11 10/26/2023     Baseline Cr between 0.75-1.1  Initial HERIBERTO felt 2/2 prerenal azotemia 2/2 diuresis, reduced PO intake +/- ATN. Patient received 2 doses of Bumex IV 2 g as she had not been responding appropriately to IV 40 Lasix daily. Creatinine went up by 0.5 meeting criteria for an HERIBERTO. This may be prerenal intrinsic or postrenal.  Potential prerenal causes include reduced kidney perfusion due to lisinopril. Intrinsic can also be lisinopril due to ATN, AIN from chemo medications, TLS, crystaline nephropathy from acyclovir, rituxan nephrotoxicity, filgrastim glomerulonephritis. Post renal obstructive could be from urine retention from vincristine or cyclophosphamide bladder fibrosis. Suspect most likely due to medications as a combination of prerenal and intrinsic. FENa was 0.2%, UA shows no casts or signs of infection, urine eosinophils 0%. Patient likely has prerenal HERIBERTO from volume depletion. Received 2 L NS and 1 pRBC and cr went up by 0.07 still and Na and Cl went down, suspect that volume prevented further cr rise and free water excretion impaired by kidney function, allowing hyponatremia to increase. Creatinine increase is likely plateaued and went down the following day. Suspect that now that nephrotoxic medications have been stopped she responded well to Lasix and creatinine downtrended. HERIBERTO now resolved. Will be cautious about nephrotoxins and monitor as restarting EPOCH and ppx. Patient gets volume depleted and overloaded very easily. Especially from chemo. 10/19 Heriberto as cr went up by 0.3 in 48 hours from 0.82 on 10/17 to 1.22 on 10/19. Suspect this is prerenal hypovolemic, will see if patient improves with fluids. She gets overloaded easily so if no improvement suspect CRS again. 10/29 HERIBERTO cr went up by 0.3 again.  She has 922 ml UOP in last 24 hours. PO likely from lasix 40mg IV given yesterday. She is not hypovolemic. Plan:   Continue to monitor UO/renal function. Avoid nephrotoxic agents  Continue to monitor a.m. BMP.

## 2023-10-28 NOTE — ASSESSMENT & PLAN NOTE
Wt Readings from Last 3 Encounters:   10/28/23 76.8 kg (169 lb 5 oz)   09/07/23 74.6 kg (164 lb 6.4 oz)   08/30/23 73.3 kg (161 lb 9.6 oz)     Lab Results   Component Value Date    LVEF 60 08/28/2023     (H) 09/29/2023     (H) 09/13/2023     Echo 8/28/23: LVEF 60%.        Plan:  K > 4   Measure I/O

## 2023-10-28 NOTE — ASSESSMENT & PLAN NOTE
Cuffless trach placed 9/17, transitioned to cuffed on 10/3. Oxygen requirements not improving. For mental health patient is on buspirone, quetiapine, and sertraline. For pain, gabapentin, oxycodone scheduled and prn, prn oxycodone mainly utilized for increased pain during chemo and after a bronch. On Guaifenesin, Atrovent and Xopenex for airway maintenance. No improvement in bilateral consolidation or atelectasis and groundglass opacities on repeat CT and chest x-rays. Patient not receiving proper positive pressure to open up atelectatic lung. Bronchial cx with 3 colonies CoNS. PCP PCR invalid, repeat negative. CTCAP indicates additional groundglass opacity with paraseptal emphysema, which may be contributing to inability to remain off vent. SBTs have been unsuccessful to place patient on trach collar since return to ICU. Patient tolerated high flow all day and overnight. Back on vent overnight, failed trach collar o/n. CXR 10/19 appears unchanged from 10/16 just not good breath during image, less of right lung visualized this time. Did well overnight on trach collar trial and today she is at 11 L. After 24 hours trial and patient not desaturating or become dyspneic, she was transferred to level 2 step down. She was desatting on HFNC and VBGs showed hypercarbia and hypoxia, due to which she was transferred to critical care. Recent Labs     10/28/23  1018   PHVEN 7.347   KLE9QIC 59.6*   PO2VEN 31.2*   EVF3LIE 31.9*   BEVEN 5.3       Plan:  Continue trach collar during the day and at night. Goal FiO2 of 50%. Maintain cuff overnight from 9 pm to 6 am for positive pressure, can open cuff during the day for patient to speak. Discussed with CM. New jannette for STR, she will be able to come to the hospital for chemotherapy infusions and be admitted for 4 days every 3 weeks.

## 2023-10-29 ENCOUNTER — APPOINTMENT (INPATIENT)
Dept: RADIOLOGY | Facility: HOSPITAL | Age: 68
DRG: 004 | End: 2023-10-29
Payer: COMMERCIAL

## 2023-10-29 LAB
ALBUMIN SERPL BCP-MCNC: 3.1 G/DL (ref 3.5–5)
ALP SERPL-CCNC: 133 U/L (ref 34–104)
ALT SERPL W P-5'-P-CCNC: 39 U/L (ref 7–52)
ANION GAP SERPL CALCULATED.3IONS-SCNC: 5 MMOL/L
ANISOCYTOSIS BLD QL SMEAR: PRESENT
AST SERPL W P-5'-P-CCNC: 25 U/L (ref 13–39)
BASO STIPL BLD QL SMEAR: PRESENT
BASOPHILS # BLD MANUAL: 0 THOUSAND/UL (ref 0–0.1)
BASOPHILS NFR MAR MANUAL: 0 % (ref 0–1)
BILIRUB SERPL-MCNC: 0.24 MG/DL (ref 0.2–1)
BUN SERPL-MCNC: 31 MG/DL (ref 5–25)
CA-I BLD-SCNC: 1.29 MMOL/L (ref 1.12–1.32)
CALCIUM ALBUM COR SERPL-MCNC: 11 MG/DL (ref 8.3–10.1)
CALCIUM SERPL-MCNC: 10.3 MG/DL (ref 8.4–10.2)
CHLORIDE SERPL-SCNC: 97 MMOL/L (ref 96–108)
CO2 SERPL-SCNC: 35 MMOL/L (ref 21–32)
CREAT SERPL-MCNC: 1.49 MG/DL (ref 0.6–1.3)
EOSINOPHIL # BLD MANUAL: 0 THOUSAND/UL (ref 0–0.4)
EOSINOPHIL NFR BLD MANUAL: 0 % (ref 0–6)
ERYTHROCYTE [DISTWIDTH] IN BLOOD BY AUTOMATED COUNT: 16 % (ref 11.6–15.1)
GFR SERPL CREATININE-BSD FRML MDRD: 35 ML/MIN/1.73SQ M
GLUCOSE SERPL-MCNC: 103 MG/DL (ref 65–140)
HCT VFR BLD AUTO: 26.9 % (ref 34.8–46.1)
HGB BLD-MCNC: 8.2 G/DL (ref 11.5–15.4)
HYPERCHROMIA BLD QL SMEAR: PRESENT
LYMPHOCYTES # BLD AUTO: 1.78 THOUSAND/UL (ref 0.6–4.47)
LYMPHOCYTES # BLD AUTO: 8 % (ref 14–44)
MAGNESIUM SERPL-MCNC: 2.1 MG/DL (ref 1.9–2.7)
MCH RBC QN AUTO: 30.3 PG (ref 26.8–34.3)
MCHC RBC AUTO-ENTMCNC: 30.5 G/DL (ref 31.4–37.4)
MCV RBC AUTO: 99 FL (ref 82–98)
METAMYELOCYTES NFR BLD MANUAL: 16 % (ref 0–1)
MONOCYTES # BLD AUTO: 2.9 THOUSAND/UL (ref 0–1.22)
MONOCYTES NFR BLD: 13 % (ref 4–12)
MYELOCYTES NFR BLD MANUAL: 7 % (ref 0–1)
NEUTROPHILS # BLD MANUAL: 11.82 THOUSAND/UL (ref 1.85–7.62)
NEUTS BAND NFR BLD MANUAL: 13 % (ref 0–8)
NEUTS SEG NFR BLD AUTO: 40 % (ref 43–75)
NRBC BLD AUTO-RTO: 4 /100 WBC (ref 0–2)
PATHOLOGY REVIEW: YES
PHOSPHATE SERPL-MCNC: 3.9 MG/DL (ref 2.3–4.1)
PLATELET # BLD AUTO: 159 THOUSANDS/UL (ref 149–390)
PLATELET BLD QL SMEAR: ADEQUATE
PMV BLD AUTO: 9.6 FL (ref 8.9–12.7)
POLYCHROMASIA BLD QL SMEAR: PRESENT
POTASSIUM SERPL-SCNC: 4.9 MMOL/L (ref 3.5–5.3)
PROCALCITONIN SERPL-MCNC: 0.24 NG/ML
PROMYELOCYTES NFR BLD MANUAL: 3 % (ref 0–0)
PROT SERPL-MCNC: 6 G/DL (ref 6.4–8.4)
RBC # BLD AUTO: 2.71 MILLION/UL (ref 3.81–5.12)
RBC MORPH BLD: PRESENT
SODIUM SERPL-SCNC: 137 MMOL/L (ref 135–147)
WBC # BLD AUTO: 22.3 THOUSAND/UL (ref 4.31–10.16)

## 2023-10-29 PROCEDURE — 99291 CRITICAL CARE FIRST HOUR: CPT | Performed by: INTERNAL MEDICINE

## 2023-10-29 PROCEDURE — 80053 COMPREHEN METABOLIC PANEL: CPT

## 2023-10-29 PROCEDURE — 85007 BL SMEAR W/DIFF WBC COUNT: CPT

## 2023-10-29 PROCEDURE — 85027 COMPLETE CBC AUTOMATED: CPT

## 2023-10-29 PROCEDURE — 83735 ASSAY OF MAGNESIUM: CPT

## 2023-10-29 PROCEDURE — 82330 ASSAY OF CALCIUM: CPT

## 2023-10-29 PROCEDURE — 84100 ASSAY OF PHOSPHORUS: CPT

## 2023-10-29 PROCEDURE — 94003 VENT MGMT INPAT SUBQ DAY: CPT

## 2023-10-29 PROCEDURE — 71045 X-RAY EXAM CHEST 1 VIEW: CPT

## 2023-10-29 PROCEDURE — 84145 PROCALCITONIN (PCT): CPT

## 2023-10-29 PROCEDURE — 94640 AIRWAY INHALATION TREATMENT: CPT

## 2023-10-29 PROCEDURE — 94760 N-INVAS EAR/PLS OXIMETRY 1: CPT

## 2023-10-29 RX ORDER — VANCOMYCIN HYDROCHLORIDE 1 G/200ML
1000 INJECTION, SOLUTION INTRAVENOUS EVERY 24 HOURS
Status: DISCONTINUED | OUTPATIENT
Start: 2023-10-30 | End: 2023-10-30 | Stop reason: SDUPTHER

## 2023-10-29 RX ADMIN — ACYCLOVIR 400 MG: 200 CAPSULE ORAL at 17:43

## 2023-10-29 RX ADMIN — BUSPIRONE HYDROCHLORIDE 30 MG: 10 TABLET ORAL at 09:02

## 2023-10-29 RX ADMIN — Medication 8.8 MG: at 21:19

## 2023-10-29 RX ADMIN — METOPROLOL TARTRATE 25 MG: 25 TABLET, FILM COATED ORAL at 21:05

## 2023-10-29 RX ADMIN — VANCOMYCIN HYDROCHLORIDE 1500 MG: 5 INJECTION, POWDER, LYOPHILIZED, FOR SOLUTION INTRAVENOUS at 13:46

## 2023-10-29 RX ADMIN — NICOTINE 7 MG: 7 PATCH, EXTENDED RELEASE TRANSDERMAL at 08:50

## 2023-10-29 RX ADMIN — SERTRALINE 75 MG: 25 TABLET, FILM COATED ORAL at 08:59

## 2023-10-29 RX ADMIN — LEVALBUTEROL HYDROCHLORIDE 1.25 MG: 1.25 SOLUTION RESPIRATORY (INHALATION) at 13:45

## 2023-10-29 RX ADMIN — OXYCODONE HYDROCHLORIDE 5 MG: 5 SOLUTION ORAL at 05:54

## 2023-10-29 RX ADMIN — SODIUM CHLORIDE SOLN NEBU 3% 4 ML: 3 NEBU SOLN at 07:12

## 2023-10-29 RX ADMIN — AMLODIPINE BESYLATE 10 MG: 5 TABLET ORAL at 08:59

## 2023-10-29 RX ADMIN — BUSPIRONE HYDROCHLORIDE 30 MG: 10 TABLET ORAL at 18:07

## 2023-10-29 RX ADMIN — LEVALBUTEROL HYDROCHLORIDE 1.25 MG: 1.25 SOLUTION RESPIRATORY (INHALATION) at 07:12

## 2023-10-29 RX ADMIN — CHLORHEXIDINE GLUCONATE 15 ML: 1.2 SOLUTION ORAL at 08:59

## 2023-10-29 RX ADMIN — GABAPENTIN 300 MG: 300 CAPSULE ORAL at 21:05

## 2023-10-29 RX ADMIN — GABAPENTIN 300 MG: 300 CAPSULE ORAL at 16:59

## 2023-10-29 RX ADMIN — CHLORHEXIDINE GLUCONATE 15 ML: 1.2 SOLUTION ORAL at 21:06

## 2023-10-29 RX ADMIN — LEVOFLOXACIN 250 MG: 250 TABLET, FILM COATED ORAL at 08:59

## 2023-10-29 RX ADMIN — ENOXAPARIN SODIUM 40 MG: 40 INJECTION SUBCUTANEOUS at 08:59

## 2023-10-29 RX ADMIN — FAMOTIDINE 20 MG: 20 TABLET, FILM COATED ORAL at 08:59

## 2023-10-29 RX ADMIN — METOPROLOL TARTRATE 25 MG: 25 TABLET, FILM COATED ORAL at 08:59

## 2023-10-29 RX ADMIN — IPRATROPIUM BROMIDE 0.5 MG: 0.5 SOLUTION RESPIRATORY (INHALATION) at 07:12

## 2023-10-29 RX ADMIN — GABAPENTIN 300 MG: 300 CAPSULE ORAL at 08:59

## 2023-10-29 RX ADMIN — CEFEPIME 2000 MG: 2 INJECTION, POWDER, FOR SOLUTION INTRAVENOUS at 13:11

## 2023-10-29 RX ADMIN — FLUCONAZOLE 200 MG: 100 TABLET ORAL at 08:59

## 2023-10-29 RX ADMIN — ACYCLOVIR 400 MG: 200 CAPSULE ORAL at 09:03

## 2023-10-29 RX ADMIN — LEVALBUTEROL HYDROCHLORIDE 1.25 MG: 1.25 SOLUTION RESPIRATORY (INHALATION) at 19:20

## 2023-10-29 RX ADMIN — SODIUM CHLORIDE SOLN NEBU 3% 4 ML: 3 NEBU SOLN at 19:20

## 2023-10-29 RX ADMIN — QUETIAPINE FUMARATE 50 MG: 25 TABLET ORAL at 21:05

## 2023-10-29 RX ADMIN — BUSPIRONE HYDROCHLORIDE 30 MG: 10 TABLET ORAL at 21:05

## 2023-10-29 RX ADMIN — SODIUM CHLORIDE SOLN NEBU 3% 4 ML: 3 NEBU SOLN at 13:44

## 2023-10-29 RX ADMIN — MELATONIN 3 MG: at 21:05

## 2023-10-29 RX ADMIN — IPRATROPIUM BROMIDE 0.5 MG: 0.5 SOLUTION RESPIRATORY (INHALATION) at 19:20

## 2023-10-29 RX ADMIN — OXYCODONE HYDROCHLORIDE 5 MG: 5 SOLUTION ORAL at 21:04

## 2023-10-29 RX ADMIN — IPRATROPIUM BROMIDE 0.5 MG: 0.5 SOLUTION RESPIRATORY (INHALATION) at 13:44

## 2023-10-29 RX ADMIN — OXYCODONE HYDROCHLORIDE 5 MG: 5 SOLUTION ORAL at 12:40

## 2023-10-29 RX ADMIN — FOLIC ACID 1 MG: 1 TABLET ORAL at 08:59

## 2023-10-29 RX ADMIN — FAMOTIDINE 20 MG: 20 TABLET, FILM COATED ORAL at 17:43

## 2023-10-29 NOTE — PLAN OF CARE
Problem: MOBILITY - ADULT  Goal: Maintain or return to baseline ADL function  Description: INTERVENTIONS:  -  Assess patient's ability to carry out ADLs; assess patient's baseline for ADL function and identify physical deficits which impact ability to perform ADLs (bathing, care of mouth/teeth, toileting, grooming, dressing, etc.)  - Assess/evaluate cause of self-care deficits   - Assess range of motion  - Assess patient's mobility; develop plan if impaired  - Assess patient's need for assistive devices and provide as appropriate  - Encourage maximum independence but intervene and supervise when necessary  - Involve family in performance of ADLs  - Assess for home care needs following discharge   - Consider OT consult to assist with ADL evaluation and planning for discharge  - Provide patient education as appropriate  Outcome: Progressing  Goal: Maintains/Returns to pre admission functional level  Description: INTERVENTIONS:  - Perform BMAT or MOVE assessment daily.   - Set and communicate daily mobility goal to care team and patient/family/caregiver.    - Collaborate with rehabilitation services on mobility goals if consulted  - Out of bed for toileting  - Record patient progress and toleration of activity level   Outcome: Progressing     Problem: Prexisting or High Potential for Compromised Skin Integrity  Goal: Skin integrity is maintained or improved  Description: INTERVENTIONS:  - Identify patients at risk for skin breakdown  - Assess and monitor skin integrity  - Assess and monitor nutrition and hydration status  - Monitor labs   - Assess for incontinence   - Turn and reposition patient  - Assist with mobility/ambulation  - Relieve pressure over bony prominences  - Avoid friction and shearing  - Provide appropriate hygiene as needed including keeping skin clean and dry  - Evaluate need for skin moisturizer/barrier cream  - Collaborate with interdisciplinary team   - Patient/family teaching  - Consider wound care consult   Outcome: Progressing     Problem: Nutrition/Hydration-ADULT  Goal: Nutrient/Hydration intake appropriate for improving, restoring or maintaining nutritional needs  Description: Monitor and assess patient's nutrition/hydration status for malnutrition. Collaborate with interdisciplinary team and initiate plan and interventions as ordered. Monitor patient's weight and dietary intake as ordered or per policy. Utilize nutrition screening tool and intervene as necessary. Determine patient's food preferences and provide high-protein, high-caloric foods as appropriate.      INTERVENTIONS:  - Monitor oral intake, urinary output, labs, and treatment plans  - Assess nutrition and hydration status and recommend course of action  - Evaluate amount of meals eaten  - Assist patient with eating if necessary   - Allow adequate time for meals  - Recommend/ encourage appropriate diets, oral nutritional supplements, and vitamin/mineral supplements  - Order, calculate, and assess calorie counts as needed  - Recommend, monitor, and adjust tube feedings and TPN/PPN based on assessed needs  - Assess need for intravenous fluids  - Provide specific nutrition/hydration education as appropriate  - Include patient/family/caregiver in decisions related to nutrition  Outcome: Progressing     Problem: PAIN - ADULT  Goal: Verbalizes/displays adequate comfort level or baseline comfort level  Description: Interventions:  - Encourage patient to monitor pain and request assistance  - Assess pain using appropriate pain scale  - Administer analgesics based on type and severity of pain and evaluate response  - Implement non-pharmacological measures as appropriate and evaluate response  - Consider cultural and social influences on pain and pain management  - Notify physician/advanced practitioner if interventions unsuccessful or patient reports new pain  Outcome: Progressing     Problem: INFECTION - ADULT  Goal: Absence or prevention of progression during hospitalization  Description: INTERVENTIONS:  - Assess and monitor for signs and symptoms of infection  - Monitor lab/diagnostic results  - Monitor all insertion sites, i.e. indwelling lines, tubes, and drains  - Monitor endotracheal if appropriate and nasal secretions for changes in amount and color  - [place] appropriate cooling/warming therapies per order  - Administer medications as ordered  - Instruct and encourage patient and family to use good hand hygiene technique  - Identify and instruct in appropriate isolation precautions for identified infection/condition  Outcome: Progressing  Goal: Absence of fever/infection during neutropenic period  Description: INTERVENTIONS:  - Monitor WBC    Outcome: Progressing     Problem: SAFETY ADULT  Goal: Maintain or return to baseline ADL function  Description: INTERVENTIONS:  -  Assess patient's ability to carry out ADLs; assess patient's baseline for ADL function and identify physical deficits which impact ability to perform ADLs (bathing, care of mouth/teeth, toileting, grooming, dressing, etc.)  - Assess/evaluate cause of self-care deficits   - Assess range of motion  - Assess patient's mobility; develop plan if impaired  - Assess patient's need for assistive devices and provide as appropriate  - Encourage maximum independence but intervene and supervise when necessary  - Involve family in performance of ADLs  - Assess for home care needs following discharge   - Consider OT consult to assist with ADL evaluation and planning for discharge  - Provide patient education as appropriate  Outcome: Progressing  Goal: Maintains/Returns to pre admission functional level  Description: INTERVENTIONS:  - Perform BMAT or MOVE assessment daily.   - Set and communicate daily mobility goal to care team and patient/family/caregiver.    - Collaborate with rehabilitation services on mobility goals if consulted  - Out of bed for toileting  - Record patient progress and toleration of activity level   Outcome: Progressing  Goal: Patient will remain free of falls  Description: INTERVENTIONS:  - Educate patient/family on patient safety including physical limitations  - Instruct patient to call for assistance with activity   - Consult OT/PT to assist with strengthening/mobility   - Keep Call bell within reach  - Keep bed low and locked with side rails adjusted as appropriate  - Keep care items and personal belongings within reach  - Initiate and maintain comfort rounds  - Make Fall Risk Sign visible to staff  - Apply yellow socks and bracelet for high fall risk patients  - Consider moving patient to room near nurses station  Outcome: Progressing

## 2023-10-29 NOTE — PROGRESS NOTES
Pal Watson is a 77 y.o. female who is currently ordered Vancomycin IV with management by the Pharmacy Consult service. Relevant clinical data and objective / subjective history reviewed. Vancomycin Assessment:  Indication and Goal AUC/Trough: Pneumonia (goal -600, trough >10), -600, trough >10  Clinical Status: New Start  Micro:     Renal Function:  SCr: 1.49 mg/dL  CrCl: 32.3 mL/min  Renal replacement: Not on dialysis  Days of Therapy: 1  Current Dose: 1250mg q12h  Vancomycin Plan:  New Dosinmg LDx1, then 1000mg IV q24h  Estimated AUC: 502 mcg*hr/mL  Estimated Trough: 16 mcg/mL  Next Level: 23 with AM labs  Renal Function Monitoring: Daily BMP and [place] will continue to follow closely for s/sx of nephrotoxicity, infusion reactions and appropriateness of therapy. BMP and CBC will be ordered per protocol. We will continue to follow the patient’s culture results and clinical progress daily.     Shruthi Oviedo, Pharmacist

## 2023-10-29 NOTE — RESPIRATORY THERAPY NOTE
10/29/23 0712   Respiratory Assessment   Assessment Type During-treatment   General Appearance Awake; Alert   Respiratory Pattern Labored   Chest Assessment Chest expansion symmetrical   Bilateral Breath Sounds Coarse   Cough Productive;Strong   Suction Trach   Resp Comments Called to pt room her sat was 69 %. lavaged and sx fpr mod thick yellow/green secretion.  Tx started and HFNC increased to 60LPM/80%   Oxygen Therapy/Pulse Ox   O2 Device High flow nasal cannula   O2 Therapy Oxygen humidified   FiO2 (%) (S)  80   O2 Flow Rate (L/min) (S)  60 L/min   SpO2 (!) 89 %   SpO2 Activity At Rest   $ Pulse Oximetry Spot Check Charge Completed

## 2023-10-29 NOTE — PROGRESS NOTES
[id number]  Progress Note  Name: Raul Kahn  MRN: [id number]  Unit/Bed#: ICU 03 I Date of Admission: 9/13/2023   Date of Service: 10/29/2023 I Hospital Day: 55    Assessment/Plan   * Acute respiratory failure with hypoxia secondary to oropharyngeal mass  Assessment & Plan  Cuffless trach placed 9/17, transitioned to cuffed on 10/3. Oxygen requirements not improving. For mental health patient is on buspirone, quetiapine, and sertraline. For pain, gabapentin, oxycodone scheduled and prn, prn oxycodone mainly utilized for increased pain during chemo and after a bronch. On Guaifenesin, Atrovent and Xopenex for airway maintenance. No improvement in bilateral consolidation or atelectasis and groundglass opacities on repeat CT and chest x-rays. Patient not receiving proper positive pressure to open up atelectatic lung. Bronchial cx with 3 colonies CoNS. PCP PCR invalid, repeat negative. CTCAP indicates additional groundglass opacity with paraseptal emphysema, which may be contributing to inability to remain off vent. SBTs have been unsuccessful to place patient on trach collar since return to ICU. Patient tolerated high flow all day and overnight. Back on vent overnight, failed trach collar o/n. CXR 10/19 appears unchanged from 10/16 just not good breath during image, less of right lung visualized this time. Did well overnight on trach collar trial and today she is at 11 L. After 24 hours trial and patient not desaturating or become dyspneic, she was transferred to level 2 step down. She was desatting on HFNC and VBGs showed hypercarbia and hypoxia, due to which she was transferred to critical care. Recent Labs     10/28/23  1018   PHVEN 7.347   CCI3FST 59.6*   PO2VEN 31.2*   [id number] 31.9*   BEVEN 5.3       Plan:  Continue trach collar during the day and at night. Goal FiO2 of 50%.   Maintain cuff overnight from 9 pm to 6 am for positive pressure, can open cuff during the day for patient to speak. Discussed with CM. New jannette for STR, she will be able to come to the hospital for chemotherapy infusions and be admitted for 4 days every 3 weeks. Large cell lymphoma ([address])  Assessment & Plan  Large B-cell lymphoma, stage II. First chemo cycle 9/22 4 days of R-EPOCH. 9/27 Rituxan. 9/28 Filgrastim. Acyclovir, Zyloprim, Diflucan, Levaquin, Zofran, Bactrim for chemo prophylaxis, all discontinued with heme-onc's approval as Nafisa reached 4200. As per heme/onc attending pancytopenia is expected with her chemotherapy. Restarted all ppx 10/13 for [facility] cycle 2, which was done over 4 days. Ppx and filgrastim can be discontinued when patient is no longer neutropenic after this cycle again. Tunneled line in R IJ in place. She received cytoxan on 10/19. Neutropenic precautions will be needed in a few days    Plan:  Transfuse blood products as needed. Keep Hgb>7 and Plt>20 for chemo   Continue rituximab infusions as per heme/onc plan. Receiving daily filgrastim. See acute respiratory failure above    Oropharyngeal mass  Assessment & Plan  9/14 Neck/ soft tissue CT: Large enhancing soft tissue mass identified within the left neck involving multiple spaces. Centered within the left oropharynx with extensions as described above into multiple spaces. This results in significant airway compromise. 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 Small level 3/level 4 nodes are seen within the neck. Tracheostomy by ENT. Replaced on 9/26. H/o tobacco abuse, daily smoker. On nicotine while inpatient, confirmed with patient she needs nicotine patch. CT soft tissue neck shows reduced mass effect from 9/14 to 10/13. Can consider reducing trach size if continues to improve    Plan:  ENT and heme onc following  Will titrate NRT weekly  As per speech therapist recommendations she will continue puree foods. Not ready to advance diet. Continue PEG tube feeds for nutrition.    See acute respiratory failure and large B cell lymphoma above      CKD (chronic kidney disease) stage 3, GFR 30-59 ml/min [facility])  Assessment & Plan  Lab Results   Component Value Date    EGFR 46 10/28/2023    EGFR 48 10/27/2023    EGFR 50 10/26/2023    CREATININE 1.19 10/28/2023    CREATININE 1.14 10/27/2023    CREATININE 1.11 10/26/2023     Baseline Cr between 0.75-1.1  Initial HERIBERTO felt 2/2 prerenal azotemia 2/2 diuresis, reduced PO intake +/- ATN. Patient received 2 doses of Bumex IV 2 g as she had not been responding appropriately to IV 40 Lasix daily. Creatinine went up by 0.5 meeting criteria for an HERIBERTO. This may be prerenal intrinsic or postrenal.  Potential prerenal causes include reduced kidney perfusion due to lisinopril. Intrinsic can also be lisinopril due to ATN, AIN from chemo medications, TLS, crystaline nephropathy from acyclovir, rituxan nephrotoxicity, filgrastim glomerulonephritis. Post renal obstructive could be from urine retention from vincristine or cyclophosphamide bladder fibrosis. Suspect most likely due to medications as a combination of prerenal and intrinsic. FENa was 0.2%, UA shows no casts or signs of infection, urine eosinophils 0%. Patient likely has prerenal HERIBERTO from volume depletion. Received 2 L NS and 1 pRBC and cr went up by 0.07 still and Na and Cl went down, suspect that volume prevented further cr rise and free water excretion impaired by kidney function, allowing hyponatremia to increase. Creatinine increase is likely plateaued and went down the following day. Suspect that now that nephrotoxic medications have been stopped she responded well to Lasix and creatinine downtrended. HERIBERTO now resolved. Will be cautious about nephrotoxins and monitor as restarting EPOCH and ppx. Patient gets volume depleted and overloaded very easily. Especially from chemo. 10/19 Heriberto as cr went up by 0.3 in 48 hours from 0.82 on 10/17 to 1.22 on 10/19. Suspect this is prerenal hypovolemic, will see if patient improves with fluids.  She gets overloaded easily so if no improvement suspect CRS again. 10/29 PO cr went up by 0.3 again. She has 922 ml UOP in last 24 hours. PO likely from lasix 40mg IV given yesterday. She is not hypovolemic. Plan:   Continue to monitor UO/renal function. Avoid nephrotoxic agents  Continue to monitor a.m. BMP. Pancytopenia due to chemotherapy [facility])  Assessment & Plan  Patient received 1 PRBC transfusion on 10/5. Hemoglobin 8.2 s/p 1 PRBC transfusion on 10/25. B/L is 12-14. No sites of bleeding, no black stools. Iron panel indicative of inflammatory anemia. Normal Vitamin B12 levels, negative hemolysis smear. Folate is low 5.5. Plan:  Trend hemoglobin and transfuse for <7. As per heme/onc transfuse irradiated and leukoreduced blood products. Trend platelets  Folic acid 1mg daily supplementation  As per my conversation with Heme/oncology attending, patient is expected to have chemotherapy associated pancytopenia. No further anemia w/u required. (HFpEF) heart failure with preserved ejection fraction [facility])  Assessment & Plan  Wt Readings from Last 3 Encounters:   10/28/23 76.8 kg (169 lb 5 oz)   09/07/23 74.6 kg (164 lb 6.4 oz)   08/30/23 73.3 kg (161 lb 9.6 oz)     Lab Results   Component Value Date    LVEF 60 08/28/2023     (H) 09/29/2023     (H) 09/13/2023     Echo 8/28/23: LVEF 60%. Plan:  K > 4   Measure I/O      Angioedema  Assessment & Plan  [facility] Swollen tongue, soft and hard palate with severe angioedema. Decadron 10 mg, Benadryl 25 mg given. 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 Plan:  Cuffless trach 9/17, cuffed Shiley XLT #7 10/3  See acute respiratory failure and oropharyngeal mass above  Continue to wean vent. Ambulatory dysfunction  Assessment & Plan  2/2 Impacted by chronic pain syndrome + prolonged hospital stay. Continue OOB with PT.    PT rec post acute rehab however reportedly Cannot get LTACH placement while getting chemo per CM  She is aware STR SNFs continue to follow and treatment can be done from a SNF level of care. PT would need to be on trach collar for at least 72 hours with no need for the vent. Aware that pt would need to be around 28% FiO2     Pain syndrome, chronic  Assessment & Plan  Home regimen: Oxycodone 5 mg BID, Tylenol 650 mg q8 prn. Gabapentin 600 mg TID. Medical marijuana. Lumbar radiculopathy and impaired ambulatory dysfunction secondary to pain. Has bowel regimen and is having bowel movements daily. Patient required additional pain management during previous cycle and has some additional pain from tunneled line placement    Plan:  Continue gabapentin 300 mg TID, oxycodone 5 mg TID, prn Tylenol 650 mg q6. Oxycodone 5mg every 8 hours  Oxycodone 2.5 mg q6 PRN    Benign essential hypertension  Assessment & Plan  Home regimen Coreg 12.5 mg BID, Amlodipine 10 mg daily, and lisinopril 40 mg. All discontinued at this time secondary to hypotension and PO since cycle 1. but stable currently without any antihypertensives. Systolic persistently elevated and tachycardic, potentially secondary to pain. Patient was more hypotensive during last chemo. Coreg contraindicated during chemo, since cycles are every other week, would be better to stick with lopressor during duration of therapy as opposed to switching constantly. Plan:  Monitor vitals. Continue Norvasc 10 and lopressor 25 bid  Prn hydralazine for SBP > 160    Depression  Assessment & Plan  Patient on Zoloft 75 daily and Seroquel hs  Patient is depressed, multiple family/palliative discussions. patient is firm that she wants to live and to fight, she is just tired. Currently goal is disease treatment oriented. Full code. No SI/HI ideations. Palliative signed off, will reconsult if patient changes her mind regarding wishes. On 10/20/23 she decided to leave AMA.  Now waiting for CM and machine     Generalized abdominal pain  Assessment & Plan  Patient reports abdominal pain which is unchanged since 9/22 no guarding on exam. Takes Famotidine for GERD at home. Alk phos 10/2 145, 10/20 79. CTCAP reveals complex right upper pole renal lesion requiring possible outpatient MRI    Continue Famotidine  On bowel regimen with Senna and Miralax prn    Chronic Superficial thrombophlebitis  Assessment & Plan  Right forearm soreness. RUE US: No acute or chronic deep vein thrombosis. 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 PO not severe enough to require renal dosing at this time, will continue to monitor and adjust dosing as needed. Continue DVT ppx with Lovenox 40    Blister (nonthermal) of left forearm, sequela  Assessment & Plan  Blisters of left upper extremity healing, no signs of infection, continue daily wound care. ICU Core Measures     Vented Patient  VAP Bundle  VAP bundle ordered     A: Assess, Prevent, and Manage Pain Has pain been assessed? Yes  Need for changes to pain regimen? No   B: Both Spontaneous Awakening Trials (SATs) and Spontaneous Breathing Trials (SBTs) Plan to perform spontaneous awakening trial today? N/A      C: Choice of Sedation RASS Goal: 0 Alert and Calm  Need for changes to sedation or analgesia regimen? NA   D: Delirium CAM-ICU: Negative   E: Early Mobility  Plan for early mobility? Yes   F: Family Engagement Plan for family engagement today? Yes       Antibiotic Review: Patient on appropriate coverage based on culture data. Review of Invasive Devices:      Central access plan: Medications requiring central line      Prophylaxis:  VTE VTE covered by:  enoxaparin, Subcutaneous, 40 mg at 10/29/23 0859       Stress Ulcer  covered byfamotidine (PEPCID) tablet 20 mg [[id number]]       Subjective   HPI/24hr events:  Patient desatted to 69%. Lavage and suctioning done with thick yellow/green secretion. HFNC was increased to 60L at 80% Fi02. Currently she is on 80L at 60%.     Review of Systems Constitutional:  Negative for chills and fever. HENT:  Negative for ear pain and sore throat. Eyes:  Negative for pain and visual disturbance. Respiratory:  Negative for cough and shortness of breath. Cardiovascular:  Negative for chest pain and palpitations. Gastrointestinal:  Negative for abdominal pain and vomiting. Genitourinary:  Negative for dysuria and hematuria. Musculoskeletal:  Negative for arthralgias and back pain. Skin:  Negative for color change and rash. Neurological:  Negative for seizures and syncope. All other systems reviewed and are negative. Objective                            Vitals I/O      Most Recent Min/Max in 24hrs   Temp 98.8 °F (37.1 °C) Temp  Min: 98.6 °F (37 °C)  Max: 99.3 °F (37.4 °C)   Pulse 92 Pulse  Min: 92  Max: 122   Resp 13 Resp  Min: 13  Max: 32   /59 BP  Min: 96/57  Max: 116/62   O2 Sat 90 % SpO2  Min: 85 %  Max: 96 %      Intake/Output Summary (Last 24 hours) at 10/29/2023 1218  Last data filed at 10/29/2023 0701  Gross per 24 hour   Intake --   Output 1075 ml   Net -1075 ml         Diet Enteral/Parenteral; Tube Feeding with Oral Diet; Two Telly HN; Continuous; 38; 190; Water; Every 4 hours; Dysphagia/Modified Consistency; Dysphagia 1-Pureed; Honey Thick Liquid; Dysphagia 1-Pureed, Honey Thick Liquid     Invasive Monitoring Physical exam    Physical Exam  Eyes:      Pupils: Pupils are equal, round, and reactive to light. HENT:      Head: Normocephalic and atraumatic. Nose: No congestion. Neck:      Comments: Trach collar  Cardiovascular:      Rate and Rhythm: Normal rate and regular rhythm. Abdominal:      General: There is no distension. Comments: PEG intact for Tube Feedings    Constitutional:       Comments: Sitting in bed upright   Pulmonary:      Effort: Pulmonary effort is normal.      Breath sounds: Normal breath sounds. Neurological:      General: No focal deficit present.       Mental Status: She is alert and oriented to person, place and time.            Diagnostic Studies      EKG: NSR  Imaging: N/A     Medications:  Scheduled PRN   acyclovir, 400 mg, BID  amLODIPine, 10 mg, Daily  busPIRone, 30 mg, TID  chlorhexidine, 15 mL, Q12H ASHLEY  enoxaparin, 40 mg, Q24H ASHLEY  famotidine, 20 mg, BID  fluconazole, 804 mg, Daily  folic acid, 1 mg, Daily  gabapentin, 300 mg, TID  levalbuterol, 1.25 mg, TID   And  ipratropium, 0.5 mg, TID  levofloxacin, 250 mg, Q24H ASHLEY  melatonin, 3 mg, HS  metoprolol tartrate, 25 mg, Q12H [address]  nicotine, 7 mg, Daily  oxyCODONE, 5 mg, Q8H  QUEtiapine, 50 mg, HS  senna, 8.8 mg, HS  sertraline, 75 mg, Daily  sodium chloride, 4 mL, TID  sulfamethoxazole-trimethoprim, 1 tablet, Once per day on Mon Wed Fri      alteplase, 2 mg, Q1MIN PRN  alteplase, 2 mg, Q1MIN PRN  ondansetron, 4 mg, Q8H PRN  oxyCODONE, 2.5 mg, Q6H PRN       Continuous          Labs:    CBC    Recent Labs     10/28/23  0427 10/29/23  0550   WBC 27.89* 22.30*   HGB 8.4* 8.2*   HCT 26.7* 26.9*    159   BANDSPCT  --  13*     BMP    Recent Labs     10/28/23  0427 10/29/23  0550   SODIUM 138 137   K 5.1 4.9    97   CO2 32 35*   AGAP 6 5   BUN 25 31*   CREATININE 1.19 1.49*   CALCIUM 10.0 10.3*       Coags    No recent results     Additional Electrolytes  Recent Labs     10/29/23  0550   MG 2.1   PHOS 3.9   CAIONIZED 1.29          Blood Gas    No recent results  Recent Labs     10/28/23  1018   PHVEN 7.347   [id number] 59.6*   PO2VEN 31.2*   [id number] 31.9*   BEVEN 5.3    LFTs  Recent Labs     10/29/23  0550   ALT 39   AST 25   ALKPHOS 133*   ALB 3.1*   TBILI 0.24       Infectious  Recent Labs     10/28/23  0427   PROCALCITONI 0.29*     Glucose  Recent Labs     10/28/23  0427 10/29/23  0550   GLUC 125 103                 Jose Alfredo Sosa MD

## 2023-10-29 NOTE — PLAN OF CARE
Problem: MOBILITY - ADULT  Goal: Maintain or return to baseline ADL function  Description: INTERVENTIONS:  -  Assess patient's ability to carry out ADLs; assess patient's baseline for ADL function and identify physical deficits which impact ability to perform ADLs (bathing, care of mouth/teeth, toileting, grooming, dressing, etc.)  - Assess/evaluate cause of self-care deficits   - Assess range of motion  - Assess patient's mobility; develop plan if impaired  - Assess patient's need for assistive devices and provide as appropriate  - Encourage maximum independence but intervene and supervise when necessary  - Involve family in performance of ADLs  - Assess for home care needs following discharge   - Consider OT consult to assist with ADL evaluation and planning for discharge  - Provide patient education as appropriate  Outcome: Progressing  Goal: Maintains/Returns to pre admission functional level  Description: INTERVENTIONS:  - Perform BMAT or MOVE assessment daily.   - Set and communicate daily mobility goal to care team and patient/family/caregiver.    - Collaborate with rehabilitation services on mobility goals if consulted  - Out of bed for toileting  - Record patient progress and toleration of activity level   Outcome: Progressing     Problem: Prexisting or High Potential for Compromised Skin Integrity  Goal: Skin integrity is maintained or improved  Description: INTERVENTIONS:  - Identify patients at risk for skin breakdown  - Assess and monitor skin integrity  - Assess and monitor nutrition and hydration status  - Monitor labs   - Assess for incontinence   - Turn and reposition patient  - Assist with mobility/ambulation  - Relieve pressure over bony prominences  - Avoid friction and shearing  - Provide appropriate hygiene as needed including keeping skin clean and dry  - Evaluate need for skin moisturizer/barrier cream  - Collaborate with interdisciplinary team   - Patient/family teaching  - Consider wound care consult   Outcome: Progressing     Problem: Nutrition/Hydration-ADULT  Goal: Nutrient/Hydration intake appropriate for improving, restoring or maintaining nutritional needs  Description: Monitor and assess patient's nutrition/hydration status for malnutrition. Collaborate with interdisciplinary team and initiate plan and interventions as ordered. Monitor patient's weight and dietary intake as ordered or per policy. Utilize nutrition screening tool and intervene as necessary. Determine patient's food preferences and provide high-protein, high-caloric foods as appropriate.      INTERVENTIONS:  - Monitor oral intake, urinary output, labs, and treatment plans  - Assess nutrition and hydration status and recommend course of action  - Evaluate amount of meals eaten  - Assist patient with eating if necessary   - Allow adequate time for meals  - Recommend/ encourage appropriate diets, oral nutritional supplements, and vitamin/mineral supplements  - Order, calculate, and assess calorie counts as needed  - Recommend, monitor, and adjust tube feedings and TPN/PPN based on assessed needs  - Assess need for intravenous fluids  - Provide specific nutrition/hydration education as appropriate  - Include patient/family/caregiver in decisions related to nutrition  Outcome: Progressing     Problem: PAIN - ADULT  Goal: Verbalizes/displays adequate comfort level or baseline comfort level  Description: Interventions:  - Encourage patient to monitor pain and request assistance  - Assess pain using appropriate pain scale  - Administer analgesics based on type and severity of pain and evaluate response  - Implement non-pharmacological measures as appropriate and evaluate response  - Consider cultural and social influences on pain and pain management  - Notify physician/advanced practitioner if interventions unsuccessful or patient reports new pain  Outcome: Progressing     Problem: INFECTION - ADULT  Goal: Absence or prevention of progression during hospitalization  Description: INTERVENTIONS:  - Assess and monitor for signs and symptoms of infection  - Monitor lab/diagnostic results  - Monitor all insertion sites, i.e. indwelling lines, tubes, and drains  - Monitor endotracheal if appropriate and nasal secretions for changes in amount and color  - [place] appropriate cooling/warming therapies per order  - Administer medications as ordered  - Instruct and encourage patient and family to use good hand hygiene technique  - Identify and instruct in appropriate isolation precautions for identified infection/condition  Outcome: Progressing  Goal: Absence of fever/infection during neutropenic period  Description: INTERVENTIONS:  - Monitor WBC    Outcome: Progressing     Problem: SAFETY ADULT  Goal: Maintain or return to baseline ADL function  Description: INTERVENTIONS:  -  Assess patient's ability to carry out ADLs; assess patient's baseline for ADL function and identify physical deficits which impact ability to perform ADLs (bathing, care of mouth/teeth, toileting, grooming, dressing, etc.)  - Assess/evaluate cause of self-care deficits   - Assess range of motion  - Assess patient's mobility; develop plan if impaired  - Assess patient's need for assistive devices and provide as appropriate  - Encourage maximum independence but intervene and supervise when necessary  - Involve family in performance of ADLs  - Assess for home care needs following discharge   - Consider OT consult to assist with ADL evaluation and planning for discharge  - Provide patient education as appropriate  Outcome: Progressing  Goal: Maintains/Returns to pre admission functional level  Description: INTERVENTIONS:  - Perform BMAT or MOVE assessment daily.   - Set and communicate daily mobility goal to care team and patient/family/caregiver.    - Collaborate with rehabilitation services on mobility goals if consulted  - Out of bed for toileting  - Record patient progress and toleration of activity level   Outcome: Progressing  Goal: Patient will remain free of falls  Description: INTERVENTIONS:  - Educate patient/family on patient safety including physical limitations  - Instruct patient to call for assistance with activity   - Consult OT/PT to assist with strengthening/mobility   - Keep Call bell within reach  - Keep bed low and locked with side rails adjusted as appropriate  - Keep care items and personal belongings within reach  - Initiate and maintain comfort rounds  - Make Fall Risk Sign visible to staff  - Apply yellow socks and bracelet for high fall risk patients  - Consider moving patient to room near nurses station  Outcome: Progressing     Problem: DISCHARGE PLANNING  Goal: Discharge to home or other facility with appropriate resources  Description: INTERVENTIONS:  - Identify barriers to discharge w/patient and caregiver  - Arrange for needed discharge resources and transportation as appropriate  - Identify discharge learning needs (meds, wound care, etc.)  - Arrange for interpretive services to assist at discharge as needed  - Refer to Case Management Department for coordinating discharge planning if the patient needs post-hospital services based on physician/advanced practitioner order or complex needs related to functional status, cognitive ability, or social support system  Outcome: Progressing     Problem: Knowledge Deficit  Goal: Patient/family/caregiver demonstrates understanding of disease process, treatment plan, medications, and discharge instructions  Description: Complete learning assessment and assess knowledge base.   Interventions:  - Provide teaching at level of understanding  - Provide teaching via preferred learning methods  Outcome: Progressing     Problem: RESPIRATORY - ADULT  Goal: Achieves optimal ventilation and oxygenation  Description: INTERVENTIONS:  - Assess for changes in respiratory status  - Assess for changes in mentation and behavior  - Position to facilitate oxygenation and minimize respiratory effort  - Oxygen administered by appropriate delivery if ordered  - Initiate smoking cessation education as indicated  - Encourage broncho-pulmonary hygiene including cough, deep breathe, Incentive Spirometry  - Assess the need for suctioning and aspirate as needed  - Assess and instruct to report SOB or any respiratory difficulty  - Respiratory Therapy support as indicated  Outcome: Progressing     Problem: GENITOURINARY - ADULT  Goal: Maintains or returns to baseline urinary function  Description: INTERVENTIONS:  - Assess urinary function  - Encourage oral fluids to ensure adequate hydration if ordered  - Administer IV fluids as ordered to ensure adequate hydration  - Administer ordered medications as needed  - Offer frequent toileting  - Follow urinary retention protocol if ordered  Outcome: Progressing  Goal: Absence of urinary retention  Description: INTERVENTIONS:  - Assess patient’s ability to void and empty bladder  - Monitor I/O  - Bladder scan as needed  - Discuss with physician/AP medications to alleviate retention as needed  - Discuss catheterization for long term situations as appropriate  Outcome: Progressing     Problem: METABOLIC, FLUID AND ELECTROLYTES - ADULT  Goal: Electrolytes maintained within normal limits  Description: INTERVENTIONS:  - Monitor labs and assess patient for signs and symptoms of electrolyte imbalances  - Administer electrolyte replacement as ordered  - Monitor response to electrolyte replacements, including repeat lab results as appropriate  - Instruct patient on fluid and nutrition as appropriate  Outcome: Progressing  Goal: Fluid balance maintained  Description: INTERVENTIONS:  - Monitor labs   - Monitor I/O and WT  - Instruct patient on fluid and nutrition as appropriate  - Assess for signs & symptoms of volume excess or deficit  Outcome: Progressing  Goal: Glucose maintained within target range  Description: INTERVENTIONS:  - Monitor Blood Glucose as ordered  - Assess for signs and symptoms of hyperglycemia and hypoglycemia  - Administer ordered medications to maintain glucose within target range  - Assess nutritional intake and initiate nutrition service referral as needed  Outcome: Progressing

## 2023-10-30 LAB
ALBUMIN SERPL BCP-MCNC: 3 G/DL (ref 3.5–5)
ALP SERPL-CCNC: 115 U/L (ref 34–104)
ALT SERPL W P-5'-P-CCNC: 33 U/L (ref 7–52)
ANION GAP SERPL CALCULATED.3IONS-SCNC: 6 MMOL/L
ANISOCYTOSIS BLD QL SMEAR: PRESENT
AST SERPL W P-5'-P-CCNC: 21 U/L (ref 13–39)
BASE EX.OXY STD BLDV CALC-SCNC: 66.1 % (ref 60–80)
BASE EXCESS BLDV CALC-SCNC: 7.6 MMOL/L
BASOPHILS # BLD MANUAL: 0 THOUSAND/UL (ref 0–0.1)
BASOPHILS NFR MAR MANUAL: 0 % (ref 0–1)
BILIRUB SERPL-MCNC: 0.26 MG/DL (ref 0.2–1)
BUN SERPL-MCNC: 31 MG/DL (ref 5–25)
CA-I BLD-SCNC: 1.27 MMOL/L (ref 1.12–1.32)
CALCIUM ALBUM COR SERPL-MCNC: 10.9 MG/DL (ref 8.3–10.1)
CALCIUM SERPL-MCNC: 10.1 MG/DL (ref 8.4–10.2)
CHLORIDE SERPL-SCNC: 99 MMOL/L (ref 96–108)
CO2 SERPL-SCNC: 32 MMOL/L (ref 21–32)
CREAT SERPL-MCNC: 1.32 MG/DL (ref 0.6–1.3)
EOSINOPHIL # BLD MANUAL: 0 THOUSAND/UL (ref 0–0.4)
EOSINOPHIL NFR BLD MANUAL: 0 % (ref 0–6)
ERYTHROCYTE [DISTWIDTH] IN BLOOD BY AUTOMATED COUNT: 16.2 % (ref 11.6–15.1)
FUNGUS SPEC CULT: NORMAL
FUNGUS SPEC CULT: NORMAL
GFR SERPL CREATININE-BSD FRML MDRD: 40 ML/MIN/1.73SQ M
GLUCOSE SERPL-MCNC: 113 MG/DL (ref 65–140)
HCO3 BLDV-SCNC: 34.1 MMOL/L (ref 24–30)
HCT VFR BLD AUTO: 26.4 % (ref 34.8–46.1)
HGB BLD-MCNC: 8.3 G/DL (ref 11.5–15.4)
LYMPHOCYTES # BLD AUTO: 1.48 THOUSAND/UL (ref 0.6–4.47)
LYMPHOCYTES # BLD AUTO: 8 % (ref 14–44)
MAGNESIUM SERPL-MCNC: 2.2 MG/DL (ref 1.9–2.7)
MCH RBC QN AUTO: 31.3 PG (ref 26.8–34.3)
MCHC RBC AUTO-ENTMCNC: 31.4 G/DL (ref 31.4–37.4)
MCV RBC AUTO: 100 FL (ref 82–98)
METAMYELOCYTES NFR BLD MANUAL: 10 % (ref 0–1)
MONOCYTES # BLD AUTO: 1.48 THOUSAND/UL (ref 0–1.22)
MONOCYTES NFR BLD: 8 % (ref 4–12)
MYELOCYTES NFR BLD MANUAL: 9 % (ref 0–1)
NEUTROPHILS # BLD MANUAL: 12.04 THOUSAND/UL (ref 1.85–7.62)
NEUTS BAND NFR BLD MANUAL: 6 % (ref 0–8)
NEUTS SEG NFR BLD AUTO: 59 % (ref 43–75)
NRBC BLD AUTO-RTO: 2 /100 WBC (ref 0–2)
O2 CT BLDV-SCNC: 8.1 ML/DL
PCO2 BLDV: 60.7 MM HG (ref 42–50)
PH BLDV: 7.37 [PH] (ref 7.3–7.4)
PHOSPHATE SERPL-MCNC: 4.9 MG/DL (ref 2.3–4.1)
PLATELET # BLD AUTO: 157 THOUSANDS/UL (ref 149–390)
PLATELET BLD QL SMEAR: ADEQUATE
PMV BLD AUTO: 9.6 FL (ref 8.9–12.7)
PO2 BLDV: 37.5 MM HG (ref 35–45)
POLYCHROMASIA BLD QL SMEAR: PRESENT
POTASSIUM SERPL-SCNC: 5.1 MMOL/L (ref 3.5–5.3)
PROT SERPL-MCNC: 5.8 G/DL (ref 6.4–8.4)
RBC # BLD AUTO: 2.65 MILLION/UL (ref 3.81–5.12)
RBC MORPH BLD: PRESENT
SODIUM SERPL-SCNC: 137 MMOL/L (ref 135–147)
WBC # BLD AUTO: 18.52 THOUSAND/UL (ref 4.31–10.16)

## 2023-10-30 PROCEDURE — 82805 BLOOD GASES W/O2 SATURATION: CPT

## 2023-10-30 PROCEDURE — 94640 AIRWAY INHALATION TREATMENT: CPT

## 2023-10-30 PROCEDURE — 82330 ASSAY OF CALCIUM: CPT

## 2023-10-30 PROCEDURE — 83735 ASSAY OF MAGNESIUM: CPT

## 2023-10-30 PROCEDURE — 80053 COMPREHEN METABOLIC PANEL: CPT

## 2023-10-30 PROCEDURE — 85027 COMPLETE CBC AUTOMATED: CPT

## 2023-10-30 PROCEDURE — 85007 BL SMEAR W/DIFF WBC COUNT: CPT

## 2023-10-30 PROCEDURE — 99232 SBSQ HOSP IP/OBS MODERATE 35: CPT | Performed by: INTERNAL MEDICINE

## 2023-10-30 PROCEDURE — 84100 ASSAY OF PHOSPHORUS: CPT

## 2023-10-30 PROCEDURE — 94760 N-INVAS EAR/PLS OXIMETRY 1: CPT

## 2023-10-30 PROCEDURE — 94003 VENT MGMT INPAT SUBQ DAY: CPT

## 2023-10-30 RX ORDER — VANCOMYCIN HYDROCHLORIDE 500 MG/100ML
500 INJECTION, SOLUTION INTRAVENOUS EVERY 12 HOURS
Status: DISCONTINUED | OUTPATIENT
Start: 2023-10-30 | End: 2023-10-31

## 2023-10-30 RX ADMIN — VANCOMYCIN HYDROCHLORIDE 500 MG: 500 INJECTION, SOLUTION INTRAVENOUS at 07:42

## 2023-10-30 RX ADMIN — ENOXAPARIN SODIUM 40 MG: 40 INJECTION SUBCUTANEOUS at 09:03

## 2023-10-30 RX ADMIN — CHLORHEXIDINE GLUCONATE 15 ML: 1.2 SOLUTION ORAL at 09:03

## 2023-10-30 RX ADMIN — FOLIC ACID 1 MG: 1 TABLET ORAL at 09:03

## 2023-10-30 RX ADMIN — IPRATROPIUM BROMIDE 0.5 MG: 0.5 SOLUTION RESPIRATORY (INHALATION) at 07:21

## 2023-10-30 RX ADMIN — QUETIAPINE FUMARATE 50 MG: 25 TABLET ORAL at 21:45

## 2023-10-30 RX ADMIN — FAMOTIDINE 20 MG: 20 TABLET, FILM COATED ORAL at 09:03

## 2023-10-30 RX ADMIN — SERTRALINE 75 MG: 25 TABLET, FILM COATED ORAL at 09:03

## 2023-10-30 RX ADMIN — ACYCLOVIR 400 MG: 200 CAPSULE ORAL at 17:35

## 2023-10-30 RX ADMIN — GABAPENTIN 300 MG: 300 CAPSULE ORAL at 17:35

## 2023-10-30 RX ADMIN — FLUCONAZOLE 200 MG: 100 TABLET ORAL at 09:03

## 2023-10-30 RX ADMIN — SODIUM CHLORIDE SOLN NEBU 3% 4 ML: 3 NEBU SOLN at 07:21

## 2023-10-30 RX ADMIN — IPRATROPIUM BROMIDE 0.5 MG: 0.5 SOLUTION RESPIRATORY (INHALATION) at 19:29

## 2023-10-30 RX ADMIN — LEVOFLOXACIN 250 MG: 250 TABLET, FILM COATED ORAL at 09:03

## 2023-10-30 RX ADMIN — Medication 8.8 MG: at 21:45

## 2023-10-30 RX ADMIN — NICOTINE 7 MG: 7 PATCH, EXTENDED RELEASE TRANSDERMAL at 09:00

## 2023-10-30 RX ADMIN — MELATONIN 3 MG: at 21:46

## 2023-10-30 RX ADMIN — ACYCLOVIR 400 MG: 200 CAPSULE ORAL at 09:03

## 2023-10-30 RX ADMIN — LEVALBUTEROL HYDROCHLORIDE 1.25 MG: 1.25 SOLUTION RESPIRATORY (INHALATION) at 07:21

## 2023-10-30 RX ADMIN — BUSPIRONE HYDROCHLORIDE 30 MG: 10 TABLET ORAL at 09:03

## 2023-10-30 RX ADMIN — OXYCODONE HYDROCHLORIDE 5 MG: 5 SOLUTION ORAL at 05:44

## 2023-10-30 RX ADMIN — LEVALBUTEROL HYDROCHLORIDE 1.25 MG: 1.25 SOLUTION RESPIRATORY (INHALATION) at 19:29

## 2023-10-30 RX ADMIN — OXYCODONE HYDROCHLORIDE 5 MG: 5 SOLUTION ORAL at 21:45

## 2023-10-30 RX ADMIN — OXYCODONE HYDROCHLORIDE 5 MG: 5 SOLUTION ORAL at 12:09

## 2023-10-30 RX ADMIN — BUSPIRONE HYDROCHLORIDE 30 MG: 10 TABLET ORAL at 17:35

## 2023-10-30 RX ADMIN — GABAPENTIN 300 MG: 300 CAPSULE ORAL at 09:03

## 2023-10-30 RX ADMIN — LEVALBUTEROL HYDROCHLORIDE 1.25 MG: 1.25 SOLUTION RESPIRATORY (INHALATION) at 14:12

## 2023-10-30 RX ADMIN — FAMOTIDINE 20 MG: 20 TABLET, FILM COATED ORAL at 17:35

## 2023-10-30 RX ADMIN — VANCOMYCIN HYDROCHLORIDE 500 MG: 500 INJECTION, SOLUTION INTRAVENOUS at 18:24

## 2023-10-30 RX ADMIN — BUSPIRONE HYDROCHLORIDE 30 MG: 10 TABLET ORAL at 21:45

## 2023-10-30 RX ADMIN — SODIUM CHLORIDE SOLN NEBU 3% 4 ML: 3 NEBU SOLN at 19:29

## 2023-10-30 RX ADMIN — GABAPENTIN 300 MG: 300 CAPSULE ORAL at 21:46

## 2023-10-30 RX ADMIN — CEFEPIME 2000 MG: 2 INJECTION, POWDER, FOR SOLUTION INTRAVENOUS at 12:09

## 2023-10-30 RX ADMIN — SODIUM CHLORIDE SOLN NEBU 3% 4 ML: 3 NEBU SOLN at 14:12

## 2023-10-30 RX ADMIN — IPRATROPIUM BROMIDE 0.5 MG: 0.5 SOLUTION RESPIRATORY (INHALATION) at 14:12

## 2023-10-30 RX ADMIN — CEFEPIME 2000 MG: 2 INJECTION, POWDER, FOR SOLUTION INTRAVENOUS at 00:02

## 2023-10-30 RX ADMIN — SULFAMETHOXAZOLE AND TRIMETHOPRIM 1 TABLET: 800; 160 TABLET ORAL at 09:03

## 2023-10-30 RX ADMIN — METOPROLOL TARTRATE 25 MG: 25 TABLET, FILM COATED ORAL at 21:46

## 2023-10-30 NOTE — ASSESSMENT & PLAN NOTE
Lab Results   Component Value Date    CHOLESTEROL 118 10/09/2023    CHOLESTEROL 203 (H) 01/24/2023    CHOLESTEROL 177 08/11/2022     Lab Results   Component Value Date    HDL 14 (L) 10/09/2023    HDL 45 (L) 01/24/2023    HDL 37 (L) 08/11/2022     Lab Results   Component Value Date    TRIG 144 10/09/2023    TRIG 166 (H) 09/16/2023    TRIG 160 (H) 01/24/2023     Lab Results   Component Value Date    NONHDLC 104 10/09/2023    [address] 07/12/2018    [address] [facility] [address] 207 (H) 04/29/2013     Continue outpatient diet and lifestyle modification.

## 2023-10-30 NOTE — PROGRESS NOTES
Raissa Hassan is a 77 y.o. female who is currently ordered Vancomycin IV with management by the Pharmacy Consult service. Relevant clinical data and objective / subjective history reviewed. Vancomycin Assessment:  Indication and Goal AUC/Trough: Pneumonia (goal -600, trough >10), -600, trough >10  Clinical Status:  new  Micro:     Renal Function:  SCr: 1.32 mg/dL  CrCl: 36 mL/min  Renal replacement: Not on dialysis  Days of Therapy: 2  Current Dose: 1500 mg loading dose given  Vancomycin Plan:  New Dosin mg IV every 12 hours  Estimated AUC: 647 mcg*hr/mL  Estimated Trough: 16 mcg/mL  Next Level: 10/31 @ 0600  Renal Function Monitoring: Daily BMP and [place] will continue to follow closely for s/sx of nephrotoxicity, infusion reactions and appropriateness of therapy. BMP and CBC will be ordered per protocol. We will continue to follow the patient’s culture results and clinical progress daily.     Claribel Richardson, Pharmacist

## 2023-10-30 NOTE — ASSESSMENT & PLAN NOTE
Lab Results   Component Value Date    EGFR 40 10/30/2023    EGFR 35 10/29/2023    EGFR 46 10/28/2023    CREATININE 1.32 (H) 10/30/2023    CREATININE 1.49 (H) 10/29/2023    CREATININE 1.19 10/28/2023     Baseline Cr between 0.75-1.1  Initial HERIBERTO felt 2/2 prerenal azotemia 2/2 diuresis, reduced PO intake +/- ATN. Patient received 2 doses of Bumex IV 2 g as she had not been responding appropriately to IV 40 Lasix daily. Creatinine went up by 0.5 meeting criteria for an HERIBERTO. This may be prerenal intrinsic or postrenal.  Potential prerenal causes include reduced kidney perfusion due to lisinopril. Intrinsic can also be lisinopril due to ATN, AIN from chemo medications, TLS, crystaline nephropathy from acyclovir, rituxan nephrotoxicity, filgrastim glomerulonephritis. Post renal obstructive could be from urine retention from vincristine or cyclophosphamide bladder fibrosis. Suspect most likely due to medications as a combination of prerenal and intrinsic. FENa was 0.2%, UA shows no casts or signs of infection, urine eosinophils 0%. Patient likely has prerenal HERIBERTO from volume depletion. Received 2 L NS and 1 pRBC and cr went up by 0.07 still and Na and Cl went down, suspect that volume prevented further cr rise and free water excretion impaired by kidney function, allowing hyponatremia to increase. Creatinine increase is likely plateaued and went down the following day. Suspect that now that nephrotoxic medications have been stopped she responded well to Lasix and creatinine downtrended. HERIBERTO now resolved. Will be cautious about nephrotoxins and monitor as restarting EPOCH and ppx. Patient gets volume depleted and overloaded very easily. Especially from chemo. 10/19 Heriberto as cr went up by 0.3 in 48 hours from 0.82 on 10/17 to 1.22 on 10/19. Suspect this is prerenal hypovolemic, will see if patient improves with fluids. She gets overloaded easily so if no improvement suspect CRS again.      10/29 HERIBERTO cr went up by 0.3 again. She has 922 ml UOP in last 24 hours. PO likely from lasix 40mg IV given yesterday. She is not hypovolemic. Plan:   Continue to monitor UO/renal function. Avoid nephrotoxic agents  Continue to monitor a.m. BMP.

## 2023-10-30 NOTE — ASSESSMENT & PLAN NOTE
POA in [place] ([place]) ED: Swollen tongue, soft and hard palate with severe angioedema. Decadron 10 mg, Benadryl 25 mg given. 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 Plan:  Cuffless trach 9/17, cuffed Shiley XLT #7 10/3  See acute respiratory failure and oropharyngeal mass above  Continue to wean vent.

## 2023-10-30 NOTE — ASSESSMENT & PLAN NOTE
Large B-cell lymphoma, stage II. First chemo cycle 9/22 4 days of R-EPOCH. 9/27 Rituxan. 9/28 Filgrastim. Acyclovir, Zyloprim, Diflucan, Levaquin, Zofran, Bactrim for chemo prophylaxis, all discontinued with heme-onc's approval as Nafisa reached 4200. As per heme/onc attending pancytopenia is expected with her chemotherapy. Restarted all ppx 10/13 for [facility] cycle 2, which was done over 4 days. Ppx and filgrastim can be discontinued when patient is no longer neutropenic after this cycle again. Tunneled line in R IJ in place. She received cytoxan on 10/19. Neutropenic precautions will be needed in a few days    Plan:  Transfuse blood products as needed. Keep Hgb>7 and Plt>20 for chemo   Continue rituximab infusions as per heme/onc plan. Receiving daily filgrastim.   See acute respiratory failure above

## 2023-10-30 NOTE — ASSESSMENT & PLAN NOTE
Cuffless trach placed 9/17, transitioned to cuffed on 10/3. Oxygen requirements not improving. For mental health patient is on buspirone, quetiapine, and sertraline. For pain, gabapentin, oxycodone scheduled and prn, prn oxycodone mainly utilized for increased pain during chemo and after a bronch. On Guaifenesin, Atrovent and Xopenex for airway maintenance. No improvement in bilateral consolidation or atelectasis and groundglass opacities on repeat CT and chest x-rays. Patient not receiving proper positive pressure to open up atelectatic lung. Bronchial cx with 3 colonies CoNS. PCP PCR invalid, repeat negative. CTCAP indicates additional groundglass opacity with paraseptal emphysema, which may be contributing to inability to remain off vent. SBTs have been unsuccessful to place patient on trach collar since return to ICU. Patient tolerated high flow all day and overnight. Back on vent overnight, failed trach collar o/n. CXR 10/19 appears unchanged from 10/16 just not good breath during image, less of right lung visualized this time. After 24 hours trial and patient not desaturating or become dyspneic, she was transferred to level 2 step down. She was desatting on HFNC and VBGs showed hypercarbia and hypoxia, due to which she was transferred to critical care. She is being titrated down to 50% FiO2, off of vent since 4 days. Unable to maintain good sats with lower FiO2. Nurse reported thick yellow/green secretions on suctioning. 10/30 she is on HFNC 58L at 88%. Recent Labs     10/30/23  0553   PHVEN 7.367   [id number] 60.7*   PO2VEN 37.5   [id number] 34.1*   BEVEN 7.6         Plan:  Cefepime and Vancomycin started. Day 2. Continue trach collar during the day and at night. Wean down HFNC to a goal FiO2 of 50%  Maintain cuff overnight from 9 pm to 6 am for positive pressure, can open cuff during the day for patient to speak. Discussed with CM.  New jannette for STR, she will be able to come to the hospital for chemotherapy infusions and be admitted for 4 days every 3 weeks.

## 2023-10-30 NOTE — ASSESSMENT & PLAN NOTE
Wt Readings from Last 3 Encounters:   10/30/23 75.2 kg (165 lb 12.6 oz)   09/07/23 74.6 kg (164 lb 6.4 oz)   08/30/23 73.3 kg (161 lb 9.6 oz)     Lab Results   Component Value Date    LVEF 60 08/28/2023     (H) 10/28/2023     (H) 09/29/2023     Echo 8/28/23: LVEF 60%.        Plan:  K > 4   Measure I/O

## 2023-10-30 NOTE — ASSESSMENT & PLAN NOTE
Lab Results   Component Value Date    CREATININE 1.32 (H) 10/30/2023    CREATININE 1.49 (H) 10/29/2023    CREATININE 1.19 10/28/2023       Likely prerenal.  Second to poor oral intake versus poor urine output. Baseline creatinine 1 to 1.2.     Cr at baseline  Plan:  Urinary retention protocol, Daily weights, I/O  Trend Cr daily

## 2023-10-30 NOTE — PLAN OF CARE
Problem: MOBILITY - ADULT  Goal: Maintain or return to baseline ADL function  Description: INTERVENTIONS:  -  Assess patient's ability to carry out ADLs; assess patient's baseline for ADL function and identify physical deficits which impact ability to perform ADLs (bathing, care of mouth/teeth, toileting, grooming, dressing, etc.)  - Assess/evaluate cause of self-care deficits   - Assess range of motion  - Assess patient's mobility; develop plan if impaired  - Assess patient's need for assistive devices and provide as appropriate  - Encourage maximum independence but intervene and supervise when necessary  - Involve family in performance of ADLs  - Assess for home care needs following discharge   - Consider OT consult to assist with ADL evaluation and planning for discharge  - Provide patient education as appropriate  Outcome: Progressing  Goal: Maintains/Returns to pre admission functional level  Description: INTERVENTIONS:  - Perform BMAT or MOVE assessment daily.   - Set and communicate daily mobility goal to care team and patient/family/caregiver.    - Collaborate with rehabilitation services on mobility goals if consulted  - Out of bed for toileting  - Record patient progress and toleration of activity level   Outcome: Progressing     Problem: Prexisting or High Potential for Compromised Skin Integrity  Goal: Skin integrity is maintained or improved  Description: INTERVENTIONS:  - Identify patients at risk for skin breakdown  - Assess and monitor skin integrity  - Assess and monitor nutrition and hydration status  - Monitor labs   - Assess for incontinence   - Turn and reposition patient  - Assist with mobility/ambulation  - Relieve pressure over bony prominences  - Avoid friction and shearing  - Provide appropriate hygiene as needed including keeping skin clean and dry  - Evaluate need for skin moisturizer/barrier cream  - Collaborate with interdisciplinary team   - Patient/family teaching  - Consider wound care consult   Outcome: Progressing     Problem: Nutrition/Hydration-ADULT  Goal: Nutrient/Hydration intake appropriate for improving, restoring or maintaining nutritional needs  Description: Monitor and assess patient's nutrition/hydration status for malnutrition. Collaborate with interdisciplinary team and initiate plan and interventions as ordered. Monitor patient's weight and dietary intake as ordered or per policy. Utilize nutrition screening tool and intervene as necessary. Determine patient's food preferences and provide high-protein, high-caloric foods as appropriate.      INTERVENTIONS:  - Monitor oral intake, urinary output, labs, and treatment plans  - Assess nutrition and hydration status and recommend course of action  - Evaluate amount of meals eaten  - Assist patient with eating if necessary   - Allow adequate time for meals  - Recommend/ encourage appropriate diets, oral nutritional supplements, and vitamin/mineral supplements  - Order, calculate, and assess calorie counts as needed  - Recommend, monitor, and adjust tube feedings and TPN/PPN based on assessed needs  - Assess need for intravenous fluids  - Provide specific nutrition/hydration education as appropriate  - Include patient/family/caregiver in decisions related to nutrition  Outcome: Progressing     Problem: PAIN - ADULT  Goal: Verbalizes/displays adequate comfort level or baseline comfort level  Description: Interventions:  - Encourage patient to monitor pain and request assistance  - Assess pain using appropriate pain scale  - Administer analgesics based on type and severity of pain and evaluate response  - Implement non-pharmacological measures as appropriate and evaluate response  - Consider cultural and social influences on pain and pain management  - Notify physician/advanced practitioner if interventions unsuccessful or patient reports new pain  Outcome: Progressing     Problem: INFECTION - ADULT  Goal: Absence or prevention of progression during hospitalization  Description: INTERVENTIONS:  - Assess and monitor for signs and symptoms of infection  - Monitor lab/diagnostic results  - Monitor all insertion sites, i.e. indwelling lines, tubes, and drains  - Monitor endotracheal if appropriate and nasal secretions for changes in amount and color  - [place] appropriate cooling/warming therapies per order  - Administer medications as ordered  - Instruct and encourage patient and family to use good hand hygiene technique  - Identify and instruct in appropriate isolation precautions for identified infection/condition  Outcome: Progressing  Goal: Absence of fever/infection during neutropenic period  Description: INTERVENTIONS:  - Monitor WBC    Outcome: Progressing     Problem: SAFETY ADULT  Goal: Maintain or return to baseline ADL function  Description: INTERVENTIONS:  -  Assess patient's ability to carry out ADLs; assess patient's baseline for ADL function and identify physical deficits which impact ability to perform ADLs (bathing, care of mouth/teeth, toileting, grooming, dressing, etc.)  - Assess/evaluate cause of self-care deficits   - Assess range of motion  - Assess patient's mobility; develop plan if impaired  - Assess patient's need for assistive devices and provide as appropriate  - Encourage maximum independence but intervene and supervise when necessary  - Involve family in performance of ADLs  - Assess for home care needs following discharge   - Consider OT consult to assist with ADL evaluation and planning for discharge  - Provide patient education as appropriate  Outcome: Progressing  Goal: Maintains/Returns to pre admission functional level  Description: INTERVENTIONS:  - Perform BMAT or MOVE assessment daily.   - Set and communicate daily mobility goal to care team and patient/family/caregiver.    - Collaborate with rehabilitation services on mobility goals if consulted  - Out of bed for toileting  - Record patient progress and toleration of activity level   Outcome: Progressing  Goal: Patient will remain free of falls  Description: INTERVENTIONS:  - Educate patient/family on patient safety including physical limitations  - Instruct patient to call for assistance with activity   - Consult OT/PT to assist with strengthening/mobility   - Keep Call bell within reach  - Keep bed low and locked with side rails adjusted as appropriate  - Keep care items and personal belongings within reach  - Initiate and maintain comfort rounds  - Make Fall Risk Sign visible to staff  - Apply yellow socks and bracelet for high fall risk patients  - Consider moving patient to room near nurses station  Outcome: Progressing     Problem: DISCHARGE PLANNING  Goal: Discharge to home or other facility with appropriate resources  Description: INTERVENTIONS:  - Identify barriers to discharge w/patient and caregiver  - Arrange for needed discharge resources and transportation as appropriate  - Identify discharge learning needs (meds, wound care, etc.)  - Arrange for interpretive services to assist at discharge as needed  - Refer to Case Management Department for coordinating discharge planning if the patient needs post-hospital services based on physician/advanced practitioner order or complex needs related to functional status, cognitive ability, or social support system  Outcome: Progressing     Problem: Knowledge Deficit  Goal: Patient/family/caregiver demonstrates understanding of disease process, treatment plan, medications, and discharge instructions  Description: Complete learning assessment and assess knowledge base.   Interventions:  - Provide teaching at level of understanding  - Provide teaching via preferred learning methods  Outcome: Progressing     Problem: RESPIRATORY - ADULT  Goal: Achieves optimal ventilation and oxygenation  Description: INTERVENTIONS:  - Assess for changes in respiratory status  - Assess for changes in mentation and behavior  - Position to facilitate oxygenation and minimize respiratory effort  - Oxygen administered by appropriate delivery if ordered  - Initiate smoking cessation education as indicated  - Encourage broncho-pulmonary hygiene including cough, deep breathe, Incentive Spirometry  - Assess the need for suctioning and aspirate as needed  - Assess and instruct to report SOB or any respiratory difficulty  - Respiratory Therapy support as indicated  Outcome: Progressing     Problem: GENITOURINARY - ADULT  Goal: Maintains or returns to baseline urinary function  Description: INTERVENTIONS:  - Assess urinary function  - Encourage oral fluids to ensure adequate hydration if ordered  - Administer IV fluids as ordered to ensure adequate hydration  - Administer ordered medications as needed  - Offer frequent toileting  - Follow urinary retention protocol if ordered  Outcome: Progressing  Goal: Absence of urinary retention  Description: INTERVENTIONS:  - Assess patient’s ability to void and empty bladder  - Monitor I/O  - Bladder scan as needed  - Discuss with physician/AP medications to alleviate retention as needed  - Discuss catheterization for long term situations as appropriate  Outcome: Progressing     Problem: METABOLIC, FLUID AND ELECTROLYTES - ADULT  Goal: Electrolytes maintained within normal limits  Description: INTERVENTIONS:  - Monitor labs and assess patient for signs and symptoms of electrolyte imbalances  - Administer electrolyte replacement as ordered  - Monitor response to electrolyte replacements, including repeat lab results as appropriate  - Instruct patient on fluid and nutrition as appropriate  Outcome: Progressing  Goal: Fluid balance maintained  Description: INTERVENTIONS:  - Monitor labs   - Monitor I/O and WT  - Instruct patient on fluid and nutrition as appropriate  - Assess for signs & symptoms of volume excess or deficit  Outcome: Progressing  Goal: Glucose maintained within target range  Description: INTERVENTIONS:  - Monitor Blood Glucose as ordered  - Assess for signs and symptoms of hyperglycemia and hypoglycemia  - Administer ordered medications to maintain glucose within target range  - Assess nutritional intake and initiate nutrition service referral as needed  Outcome: Progressing

## 2023-10-30 NOTE — PROGRESS NOTES
[facility] [facility]  Progress Note  Name: Nannie Gosselin  MRN: [id number]  Unit/Bed#: ICU 03 I Date of Admission: 9/13/2023   Date of Service: 10/30/2023 I Hospital Day: 52    Assessment/Plan   * Acute respiratory failure with hypoxia secondary to oropharyngeal mass  Assessment & Plan  Cuffless trach placed 9/17, transitioned to cuffed on 10/3. Oxygen requirements not improving. For mental health patient is on buspirone, quetiapine, and sertraline. For pain, gabapentin, oxycodone scheduled and prn, prn oxycodone mainly utilized for increased pain during chemo and after a bronch. On Guaifenesin, Atrovent and Xopenex for airway maintenance. No improvement in bilateral consolidation or atelectasis and groundglass opacities on repeat CT and chest x-rays. Patient not receiving proper positive pressure to open up atelectatic lung. Bronchial cx with 3 colonies CoNS. PCP PCR invalid, repeat negative. CTCAP indicates additional groundglass opacity with paraseptal emphysema, which may be contributing to inability to remain off vent. SBTs have been unsuccessful to place patient on trach collar since return to ICU. Patient tolerated high flow all day and overnight. Back on vent overnight, failed trach collar o/n. CXR 10/19 appears unchanged from 10/16 just not good breath during image, less of right lung visualized this time. After 24 hours trial and patient not desaturating or become dyspneic, she was transferred to level 2 step down. She was desatting on HFNC and VBGs showed hypercarbia and hypoxia, due to which she was transferred to critical care. She is being titrated down to 50% FiO2, off of vent since 4 days. Unable to maintain good sats with lower FiO2. Nurse reported thick yellow/green secretions on suctioning. 10/30 she is on HFNC 58L at 88%. Recent Labs     10/30/23  0553   PHVEN 7.367   SLB0DAT 60.7*   PO2VEN 37.5   OYR1EUM 34.1*   BEVEN 7.6         Plan:  Cefepime and Vancomycin started.  Day 2.  Continue trach collar during the day and at night. Wean down HFNC to a goal FiO2 of 50%  Maintain cuff overnight from 9 pm to 6 am for positive pressure, can open cuff during the day for patient to speak. Discussed with CM. New jannette for STR, she will be able to come to the hospital for chemotherapy infusions and be admitted for 4 days every 3 weeks. Large cell lymphoma ([address])  Assessment & Plan  Large B-cell lymphoma, stage II. First chemo cycle 9/22 4 days of R-EPOCH. 9/27 Rituxan. 9/28 Filgrastim. Acyclovir, Zyloprim, Diflucan, Levaquin, Zofran, Bactrim for chemo prophylaxis, all discontinued with heme-onc's approval as Nafisa reached 4200. As per heme/onc attending pancytopenia is expected with her chemotherapy. Restarted all ppx 10/13 for [facility] cycle 2, which was done over 4 days. Ppx and filgrastim can be discontinued when patient is no longer neutropenic after this cycle again. Tunneled line in R IJ in place. She received cytoxan on 10/19. Neutropenic precautions will be needed in a few days    Plan:  Transfuse blood products as needed. Keep Hgb>7 and Plt>20 for chemo   Continue rituximab infusions as per heme/onc plan. Receiving daily filgrastim. See acute respiratory failure above    Oropharyngeal mass  Assessment & Plan  9/14 Neck/ soft tissue CT: Large enhancing soft tissue mass identified within the left neck involving multiple spaces. Centered within the left oropharynx with extensions as described above into multiple spaces. This results in significant airway compromise. 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 Small level 3/level 4 nodes are seen within the neck. Tracheostomy by ENT. Replaced on 9/26. H/o tobacco abuse, daily smoker. On nicotine while inpatient, confirmed with patient she needs nicotine patch. CT soft tissue neck shows reduced mass effect from 9/14 to 10/13.  Can consider reducing trach size if continues to improve    Plan:  ENT and heme onc following  Will titrate NRT weekly  As per speech therapist recommendations she will continue puree foods. Not ready to advance diet. Continue PEG tube feeds for nutrition. See acute respiratory failure and large B cell lymphoma above      CKD (chronic kidney disease) stage 3, GFR 30-59 ml/min [facility])  Assessment & Plan  Lab Results   Component Value Date    EGFR 40 10/30/2023    EGFR 35 10/29/2023    EGFR 46 10/28/2023    CREATININE 1.32 (H) 10/30/2023    CREATININE 1.49 (H) 10/29/2023    CREATININE 1.19 10/28/2023     Baseline Cr between 0.75-1.1  Initial PO felt 2/2 prerenal azotemia 2/2 diuresis, reduced PO intake +/- ATN. Patient received 2 doses of Bumex IV 2 g as she had not been responding appropriately to IV 40 Lasix daily. Creatinine went up by 0.5 meeting criteria for an PO. This may be prerenal intrinsic or postrenal.  Potential prerenal causes include reduced kidney perfusion due to lisinopril. Intrinsic can also be lisinopril due to ATN, AIN from chemo medications, TLS, crystaline nephropathy from acyclovir, rituxan nephrotoxicity, filgrastim glomerulonephritis. Post renal obstructive could be from urine retention from vincristine or cyclophosphamide bladder fibrosis. Suspect most likely due to medications as a combination of prerenal and intrinsic. FENa was 0.2%, UA shows no casts or signs of infection, urine eosinophils 0%. Patient likely has prerenal PO from volume depletion. Received 2 L NS and 1 pRBC and cr went up by 0.07 still and Na and Cl went down, suspect that volume prevented further cr rise and free water excretion impaired by kidney function, allowing hyponatremia to increase. Creatinine increase is likely plateaued and went down the following day. Suspect that now that nephrotoxic medications have been stopped she responded well to Lasix and creatinine downtrended. PO now resolved. Will be cautious about nephrotoxins and monitor as restarting EPOCH and ppx.  Patient gets volume depleted and overloaded very easily. Especially from chemo. 10/19 Heriberto as cr went up by 0.3 in 48 hours from 0.82 on 10/17 to 1.22 on 10/19. Suspect this is prerenal hypovolemic, will see if patient improves with fluids. She gets overloaded easily so if no improvement suspect CRS again. 10/29 HERIBERTO cr went up by 0.3 again. She has 922 ml UOP in last 24 hours. HERIBERTO likely from lasix 40mg IV given yesterday. She is not hypovolemic. Plan:   Continue to monitor UO/renal function. Avoid nephrotoxic agents  Continue to monitor a.m. BMP. RML pneumonia-resolved as of 9/22/2023  Assessment & Plan  9/13 CT PE study: Patchy consolidation right middle lobe, new from 8/25/2023, compatible with pneumonia. No leukocytosis, no fever/chills. Positive for sore throat, cough. New onset pneumonia after recent hospitalization and antibiotics treatment. 9/14: Bronchoscopy/ ABL. MRSA negative. ABL revealed 2+ Growth of Candida albicans, suspected contaminant. HERIBERTO (acute kidney injury) (HCC)-resolved as of 9/21/2023  Assessment & Plan  Lab Results   Component Value Date    CREATININE 1.32 (H) 10/30/2023    CREATININE 1.49 (H) 10/29/2023    CREATININE 1.19 10/28/2023       Likely prerenal.  Second to poor oral intake versus poor urine output. Baseline creatinine 1 to 1.2. Cr at baseline  Plan:  Urinary retention protocol, Daily weights, I/O  Trend Cr daily    Pancytopenia due to chemotherapy [facility])  Assessment & Plan  Patient received 1 PRBC transfusion on 10/5. Hemoglobin 8.2 s/p 1 PRBC transfusion on 10/25. B/L is 12-14. No sites of bleeding, no black stools. Iron panel indicative of inflammatory anemia. Normal Vitamin B12 levels, negative hemolysis smear. Folate is low 5.5. Plan:  Trend hemoglobin and transfuse for <7. As per heme/onc transfuse irradiated and leukoreduced blood products.   Trend platelets  Folic acid 1mg daily supplementation  As per my conversation with Heme/oncology attending, patient is expected to have chemotherapy associated pancytopenia. No further anemia w/u required. (HFpEF) heart failure with preserved ejection fraction [facility])  Assessment & Plan  Wt Readings from Last 3 Encounters:   10/30/23 75.2 kg (165 lb 12.6 oz)   09/07/23 74.6 kg (164 lb 6.4 oz)   08/30/23 73.3 kg (161 lb 9.6 oz)     Lab Results   Component Value Date    LVEF 60 08/28/2023     (H) 10/28/2023     (H) 09/29/2023     Echo 8/28/23: LVEF 60%. Plan:  K > 4   Measure I/O      Angioedema  Assessment & Plan  POA in [place] ([place]) ED: Swollen tongue, soft and hard palate with severe angioedema. Decadron 10 mg, Benadryl 25 mg given. 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 Plan:  Cuffless trach 9/17, cuffed Shiley XLT #7 10/3  See acute respiratory failure and oropharyngeal mass above  Continue to wean vent. Ambulatory dysfunction  Assessment & Plan  2/2 Impacted by chronic pain syndrome + prolonged hospital stay. Continue OOB with PT. PT rec post acute rehab however reportedly Cannot get LTACH placement while getting chemo per CM  She is aware STR SNFs continue to follow and treatment can be done from a SNF level of care. PT would need to be on trach collar for at least 72 hours with no need for the vent. Aware that pt would need to be around 28% FiO2     Pain syndrome, chronic  Assessment & Plan  Home regimen: Oxycodone 5 mg BID, Tylenol 650 mg q8 prn. Gabapentin 600 mg TID. Medical marijuana. Lumbar radiculopathy and impaired ambulatory dysfunction secondary to pain. Has bowel regimen and is having bowel movements daily. Patient required additional pain management during previous cycle and has some additional pain from tunneled line placement    Plan:  Continue gabapentin 300 mg TID, oxycodone 5 mg TID, prn Tylenol 650 mg q6.     Oxycodone 5mg every 8 hours  Oxycodone 2.5 mg q6 PRN    Benign essential hypertension  Assessment & Plan  Home regimen Coreg 12.5 mg BID, Amlodipine 10 mg daily, and lisinopril 40 mg. All discontinued at this time secondary to hypotension and PO since cycle 1. but stable currently without any antihypertensives. Systolic persistently elevated and tachycardic, potentially secondary to pain. Patient was more hypotensive during last chemo. Coreg contraindicated during chemo, since cycles are every other week, would be better to stick with lopressor during duration of therapy as opposed to switching constantly. Plan:  Monitor vitals. Continue Norvasc 10 and lopressor 25 bid  Prn hydralazine for SBP > 160    Hyperlipidemia-resolved as of 10/26/2023  Assessment & Plan  Lab Results   Component Value Date    CHOLESTEROL 118 10/09/2023    CHOLESTEROL 203 (H) 01/24/2023    CHOLESTEROL 177 08/11/2022     Lab Results   Component Value Date    HDL 14 (L) 10/09/2023    HDL 45 (L) 01/24/2023    HDL 37 (L) 08/11/2022     Lab Results   Component Value Date    TRIG 144 10/09/2023    TRIG 166 (H) 09/16/2023    TRIG 160 (H) 01/24/2023     Lab Results   Component Value Date    NONHDLC 104 10/09/2023    [address] 146 07/12/2018    [address] (H) 04/29/2013     Continue outpatient diet and lifestyle modification. Depression  Assessment & Plan  Patient on Zoloft 75 daily and Seroquel hs  Patient is depressed, multiple family/palliative discussions. patient is firm that she wants to live and to fight, she is just tired. Currently goal is disease treatment oriented. Full code. No SI/HI ideations. Palliative signed off, will reconsult if patient changes her mind regarding wishes. On 10/20/23 she decided to leave [place]. Now waiting for CM and machine     Generalized abdominal pain  Assessment & Plan  Patient reports abdominal pain which is unchanged since 9/22 no guarding on exam. Takes Famotidine for GERD at home. Alk phos 10/2 145, 10/20 79.  CTCAP reveals complex right upper pole renal lesion requiring possible outpatient MRI    Continue Famotidine  On bowel regimen with Senna and Miralax prn    Chronic Superficial thrombophlebitis  Assessment & Plan  Right forearm soreness. RUE US: No acute or chronic deep vein thrombosis. 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 PO not severe enough to require renal dosing at this time, will continue to monitor and adjust dosing as needed. Continue DVT ppx with Lovenox 40    Blister (nonthermal) of left forearm, sequela  Assessment & Plan  Blisters of left upper extremity healing, no signs of infection, continue daily wound care. COPD without exacerbation (HCC)-resolved as of 9/26/2023  Assessment & Plan  Continue vent with nebs. Wean off vent. Encephalopathy-resolved as of 9/21/2023  Assessment & Plan  9/19- Pt appeared to be AAO x3. Improving. ICU Core Measures     Vented Patient  VAP Bundle  VAP bundle ordered     A: Assess, Prevent, and Manage Pain Has pain been assessed? Yes  Need for changes to pain regimen? No   B: Both Spontaneous Awakening Trials (SATs) and Spontaneous Breathing Trials (SBTs) Plan to perform spontaneous awakening trial today? N/A      C: Choice of Sedation RASS Goal: 0 Alert and Calm  Need for changes to sedation or analgesia regimen? NA   D: Delirium CAM-ICU: Negative   E: Early Mobility  Plan for early mobility? Yes   F: Family Engagement Plan for family engagement today? Yes       Antibiotic Review: Continue broad spectrum secondary to severity of illness. Review of Invasive Devices:      Central access plan: Medications requiring central line      Prophylaxis:  VTE VTE covered by:  enoxaparin, Subcutaneous, 40 mg at 10/29/23 0859       Stress Ulcer  covered byfamotidine (PEPCID) tablet 20 mg [[id number]]       Subjective   HPI/24hr events: No overnight events reported.     Review of systems deferred as patient did not want to speak to me at 8am.       Objective                            Vitals I/O      Most Recent Min/Max in 24hrs   Temp 98.9 °F (37.2 °C) Temp  Min: 98 °F (36.7 °C)  Max: 99 °F (37.2 °C)   Pulse 99 Pulse  Min: 87  Max: 113   Resp 20 Resp  Min: 13  Max: 39   /58 BP  Min: 105/58  Max: 138/80   O2 Sat 92 % SpO2  Min: 89 %  Max: 99 %      Intake/Output Summary (Last 24 hours) at 10/30/2023 0854  Last data filed at 10/30/2023 0430  Gross per 24 hour   Intake 612 ml   Output 1000 ml   Net -388 ml         Diet Enteral/Parenteral; Tube Feeding with Oral Diet; Two Telly HN; Continuous; 38; 190; Water; Every 4 hours; Dysphagia/Modified Consistency; Dysphagia 1-Pureed; Honey Thick Liquid; Dysphagia 1-Pureed, Honey Thick Liquid     Invasive Monitoring Physical exam    Physical Exam  Neck:      Trachea: Tracheostomy present. Neurological:      Mental Status: She is alert. Vitals reviewed. Rest of the physical exam deferred for now as patient did not want to speak to me in the morning.      Diagnostic Studies      EKG: NSR  Imaging: No new imaging      Medications:  Scheduled PRN   acyclovir, 400 mg, BID  amLODIPine, 10 mg, Daily  busPIRone, 30 mg, TID  cefepime, 2,000 mg, Q12H  chlorhexidine, 15 mL, Q12H ASHLEY  enoxaparin, 40 mg, Q24H ASHLEY  famotidine, 20 mg, BID  fluconazole, 226 mg, Daily  folic acid, 1 mg, Daily  gabapentin, 300 mg, TID  levalbuterol, 1.25 mg, TID   And  ipratropium, 0.5 mg, TID  levofloxacin, 250 mg, Q24H ASHLEY  melatonin, 3 mg, HS  metoprolol tartrate, 25 mg, Q12H [address]  nicotine, 7 mg, Daily  oxyCODONE, 5 mg, Q8H  QUEtiapine, 50 mg, HS  senna, 8.8 mg, HS  sertraline, 75 mg, Daily  sodium chloride, 4 mL, TID  sulfamethoxazole-trimethoprim, 1 tablet, Once per day on Mon Wed Fri  vancomycin, 500 mg, Q12H      alteplase, 2 mg, Q1MIN PRN  alteplase, 2 mg, Q1MIN PRN  ondansetron, 4 mg, Q8H PRN  oxyCODONE, 2.5 mg, Q6H PRN       Continuous          Labs:    CBC    Recent Labs     10/29/23  0550 10/30/23  0553   WBC 22.30* 18.52*   HGB 8.2* 8.3*   HCT 26.9* 26.4*    157   BANDSPCT 13*  --      BMP    Recent Labs     10/29/23  0550 10/30/23  0553   SODIUM 137 137   K 4.9 5.1 CL 97 99   CO2 35* 32   AGAP 5 6   BUN 31* 31*   CREATININE 1.49* 1.32*   CALCIUM 10.3* 10.1       Coags    No recent results     Additional Electrolytes  Recent Labs     10/29/23  0550 10/30/23  0553   MG 2.1 2.2   PHOS 3.9 4.9*   CAIONIZED 1.29 1.27          Blood Gas    No recent results  Recent Labs     10/30/23  0553   PHVEN 7.367   [id number] 60.7*   PO2VEN 37.5   [id number] 34.1*   BEVEN 7.6    LFTs  Recent Labs     10/29/23  0550 10/30/23  0553   ALT 39 33   AST 25 21   ALKPHOS 133* 115*   ALB 3.1* 3.0*   TBILI 0.24 0.26       Infectious  Recent Labs     10/29/23  1249   PROCALCITONI 0.24     Glucose  Recent Labs     10/29/23  0550 10/30/23  0553   GLUC 103 113                 Jose Alfredo Sosa MD

## 2023-10-31 PROBLEM — F32.A DEPRESSION: Chronic | Status: ACTIVE | Noted: 2023-10-02

## 2023-10-31 PROBLEM — I80.9 SUPERFICIAL THROMBOPHLEBITIS: Chronic | Status: ACTIVE | Noted: 2023-09-24

## 2023-10-31 PROBLEM — T78.3XXA ANGIOEDEMA: Chronic | Status: ACTIVE | Noted: 2023-09-13

## 2023-10-31 PROBLEM — R93.89 ABNORMAL COMPUTED TOMOGRAPHY ANGIOGRAPHY (CTA): Status: RESOLVED | Noted: 2020-11-03 | Resolved: 2023-10-31

## 2023-10-31 PROBLEM — N18.30 CKD (CHRONIC KIDNEY DISEASE) STAGE 3, GFR 30-59 ML/MIN (HCC): Chronic | Status: ACTIVE | Noted: 2021-08-20

## 2023-10-31 LAB
ALBUMIN SERPL BCP-MCNC: 2.9 G/DL (ref 3.5–5)
ALP SERPL-CCNC: 99 U/L (ref 34–104)
ALT SERPL W P-5'-P-CCNC: 28 U/L (ref 7–52)
ANION GAP SERPL CALCULATED.3IONS-SCNC: 4 MMOL/L
ARTERIAL PATENCY WRIST A: NO
AST SERPL W P-5'-P-CCNC: 16 U/L (ref 13–39)
BACTERIA SPT RESP CULT: ABNORMAL
BASE EX.OXY STD BLDV CALC-SCNC: 51.5 % (ref 60–80)
BASE EXCESS BLDV CALC-SCNC: 5.4 MMOL/L
BILIRUB SERPL-MCNC: 0.25 MG/DL (ref 0.2–1)
BUN SERPL-MCNC: 34 MG/DL (ref 5–25)
CALCIUM ALBUM COR SERPL-MCNC: 10.6 MG/DL (ref 8.3–10.1)
CALCIUM SERPL-MCNC: 9.7 MG/DL (ref 8.4–10.2)
CHLORIDE SERPL-SCNC: 100 MMOL/L (ref 96–108)
CO2 SERPL-SCNC: 31 MMOL/L (ref 21–32)
CREAT SERPL-MCNC: 1.33 MG/DL (ref 0.6–1.3)
ERYTHROCYTE [DISTWIDTH] IN BLOOD BY AUTOMATED COUNT: 16.2 % (ref 11.6–15.1)
GFR SERPL CREATININE-BSD FRML MDRD: 40 ML/MIN/1.73SQ M
GLUCOSE SERPL-MCNC: 121 MG/DL (ref 65–140)
GRAM STN SPEC: ABNORMAL
HCO3 BLDV-SCNC: 33.5 MMOL/L (ref 24–30)
HCT VFR BLD AUTO: 25.4 % (ref 34.8–46.1)
HFNC FLOW LPM: 60
HGB BLD-MCNC: 7.8 G/DL (ref 11.5–15.4)
IGA SERPL-MCNC: 196 MG/DL (ref 66–433)
IGG SERPL-MCNC: 439 MG/DL (ref 635–1741)
IGM SERPL-MCNC: <20 MG/DL (ref 45–281)
MAGNESIUM SERPL-MCNC: 2.3 MG/DL (ref 1.9–2.7)
MCH RBC QN AUTO: 31 PG (ref 26.8–34.3)
MCHC RBC AUTO-ENTMCNC: 30.7 G/DL (ref 31.4–37.4)
MCV RBC AUTO: 101 FL (ref 82–98)
MYCOBACTERIUM SPEC CULT: NORMAL
MYCOBACTERIUM SPEC CULT: NORMAL
NON VENT HFNC FIO2: 85
NON VENT TYPE HFNC: ABNORMAL
O2 CT BLDV-SCNC: 9.8 ML/DL
PCO2 BLDV: 65.9 MM HG (ref 42–50)
PH BLDV: 7.32 [PH] (ref 7.3–7.4)
PHOSPHATE SERPL-MCNC: 5.4 MG/DL (ref 2.3–4.1)
PLATELET # BLD AUTO: 154 THOUSANDS/UL (ref 149–390)
PMV BLD AUTO: 9.8 FL (ref 8.9–12.7)
PO2 BLDV: 30.4 MM HG (ref 35–45)
POTASSIUM SERPL-SCNC: 5 MMOL/L (ref 3.5–5.3)
PROT SERPL-MCNC: 5.7 G/DL (ref 6.4–8.4)
RBC # BLD AUTO: 2.52 MILLION/UL (ref 3.81–5.12)
RHODAMINE-AURAMINE STN SPEC: NORMAL
RHODAMINE-AURAMINE STN SPEC: NORMAL
SODIUM SERPL-SCNC: 135 MMOL/L (ref 135–147)
VANCOMYCIN TROUGH SERPL-MCNC: 19.2 UG/ML (ref 10–20)
WBC # BLD AUTO: 13.87 THOUSAND/UL (ref 4.31–10.16)

## 2023-10-31 PROCEDURE — 80202 ASSAY OF VANCOMYCIN: CPT | Performed by: INTERNAL MEDICINE

## 2023-10-31 PROCEDURE — 94003 VENT MGMT INPAT SUBQ DAY: CPT

## 2023-10-31 PROCEDURE — 83735 ASSAY OF MAGNESIUM: CPT

## 2023-10-31 PROCEDURE — 82784 ASSAY IGA/IGD/IGG/IGM EACH: CPT | Performed by: STUDENT IN AN ORGANIZED HEALTH CARE EDUCATION/TRAINING PROGRAM

## 2023-10-31 PROCEDURE — 82805 BLOOD GASES W/O2 SATURATION: CPT

## 2023-10-31 PROCEDURE — 92526 ORAL FUNCTION THERAPY: CPT

## 2023-10-31 PROCEDURE — 85027 COMPLETE CBC AUTOMATED: CPT

## 2023-10-31 PROCEDURE — 94640 AIRWAY INHALATION TREATMENT: CPT

## 2023-10-31 PROCEDURE — 84100 ASSAY OF PHOSPHORUS: CPT

## 2023-10-31 PROCEDURE — 80053 COMPREHEN METABOLIC PANEL: CPT

## 2023-10-31 PROCEDURE — 99232 SBSQ HOSP IP/OBS MODERATE 35: CPT | Performed by: INTERNAL MEDICINE

## 2023-10-31 PROCEDURE — 94760 N-INVAS EAR/PLS OXIMETRY 1: CPT

## 2023-10-31 RX ORDER — LEVALBUTEROL INHALATION SOLUTION 1.25 MG/3ML
1.25 SOLUTION RESPIRATORY (INHALATION) EVERY 6 HOURS
Status: DISCONTINUED | OUTPATIENT
Start: 2023-10-31 | End: 2023-12-04 | Stop reason: HOSPADM

## 2023-10-31 RX ADMIN — METOPROLOL TARTRATE 25 MG: 25 TABLET, FILM COATED ORAL at 21:23

## 2023-10-31 RX ADMIN — ENOXAPARIN SODIUM 40 MG: 40 INJECTION SUBCUTANEOUS at 08:57

## 2023-10-31 RX ADMIN — SERTRALINE 75 MG: 25 TABLET, FILM COATED ORAL at 08:57

## 2023-10-31 RX ADMIN — GABAPENTIN 300 MG: 300 CAPSULE ORAL at 17:12

## 2023-10-31 RX ADMIN — LEVALBUTEROL HYDROCHLORIDE 1.25 MG: 1.25 SOLUTION RESPIRATORY (INHALATION) at 21:36

## 2023-10-31 RX ADMIN — LEVOFLOXACIN 250 MG: 250 TABLET, FILM COATED ORAL at 08:57

## 2023-10-31 RX ADMIN — IPRATROPIUM BROMIDE 0.5 MG: 0.5 SOLUTION RESPIRATORY (INHALATION) at 07:16

## 2023-10-31 RX ADMIN — FOLIC ACID 1 MG: 1 TABLET ORAL at 08:57

## 2023-10-31 RX ADMIN — LEVALBUTEROL HYDROCHLORIDE 1.25 MG: 1.25 SOLUTION RESPIRATORY (INHALATION) at 07:16

## 2023-10-31 RX ADMIN — CHLORHEXIDINE GLUCONATE 15 ML: 1.2 SOLUTION ORAL at 08:58

## 2023-10-31 RX ADMIN — BUSPIRONE HYDROCHLORIDE 30 MG: 10 TABLET ORAL at 17:12

## 2023-10-31 RX ADMIN — OXYCODONE HYDROCHLORIDE 5 MG: 5 SOLUTION ORAL at 03:47

## 2023-10-31 RX ADMIN — CEFEPIME 2000 MG: 2 INJECTION, POWDER, FOR SOLUTION INTRAVENOUS at 00:12

## 2023-10-31 RX ADMIN — BUSPIRONE HYDROCHLORIDE 30 MG: 10 TABLET ORAL at 21:24

## 2023-10-31 RX ADMIN — NICOTINE 7 MG: 7 PATCH, EXTENDED RELEASE TRANSDERMAL at 09:06

## 2023-10-31 RX ADMIN — AMLODIPINE BESYLATE 10 MG: 5 TABLET ORAL at 08:57

## 2023-10-31 RX ADMIN — LEVALBUTEROL HYDROCHLORIDE 1.25 MG: 1.25 SOLUTION RESPIRATORY (INHALATION) at 14:06

## 2023-10-31 RX ADMIN — IPRATROPIUM BROMIDE 0.5 MG: 0.5 SOLUTION RESPIRATORY (INHALATION) at 14:06

## 2023-10-31 RX ADMIN — MELATONIN 3 MG: at 21:23

## 2023-10-31 RX ADMIN — OXYCODONE HYDROCHLORIDE 5 MG: 5 SOLUTION ORAL at 12:35

## 2023-10-31 RX ADMIN — FLUCONAZOLE 200 MG: 100 TABLET ORAL at 08:57

## 2023-10-31 RX ADMIN — Medication 8.8 MG: at 21:26

## 2023-10-31 RX ADMIN — VANCOMYCIN HYDROCHLORIDE 750 MG: 750 INJECTION, SOLUTION INTRAVENOUS at 15:45

## 2023-10-31 RX ADMIN — GABAPENTIN 300 MG: 300 CAPSULE ORAL at 08:57

## 2023-10-31 RX ADMIN — FAMOTIDINE 20 MG: 20 TABLET, FILM COATED ORAL at 17:12

## 2023-10-31 RX ADMIN — QUETIAPINE FUMARATE 50 MG: 25 TABLET ORAL at 21:23

## 2023-10-31 RX ADMIN — IPRATROPIUM BROMIDE 0.5 MG: 0.5 SOLUTION RESPIRATORY (INHALATION) at 21:36

## 2023-10-31 RX ADMIN — METOPROLOL TARTRATE 25 MG: 25 TABLET, FILM COATED ORAL at 08:57

## 2023-10-31 RX ADMIN — GABAPENTIN 300 MG: 300 CAPSULE ORAL at 21:23

## 2023-10-31 RX ADMIN — CHLORHEXIDINE GLUCONATE 15 ML: 1.2 SOLUTION ORAL at 21:23

## 2023-10-31 RX ADMIN — CEFEPIME 2000 MG: 2 INJECTION, POWDER, FOR SOLUTION INTRAVENOUS at 13:15

## 2023-10-31 RX ADMIN — FAMOTIDINE 20 MG: 20 TABLET, FILM COATED ORAL at 08:57

## 2023-10-31 RX ADMIN — ACYCLOVIR 400 MG: 200 CAPSULE ORAL at 18:00

## 2023-10-31 RX ADMIN — BUSPIRONE HYDROCHLORIDE 30 MG: 10 TABLET ORAL at 08:58

## 2023-10-31 RX ADMIN — SODIUM CHLORIDE SOLN NEBU 3% 4 ML: 3 NEBU SOLN at 07:16

## 2023-10-31 RX ADMIN — OXYCODONE HYDROCHLORIDE 5 MG: 5 SOLUTION ORAL at 21:23

## 2023-10-31 RX ADMIN — ACYCLOVIR 400 MG: 200 CAPSULE ORAL at 08:58

## 2023-10-31 NOTE — SPEECH THERAPY NOTE
Speech Language/Pathology    Speech/Language Pathology Progress Note    Patient Name: Ahmad Alpers  [facility] Date: 10/31/2023       Subjective:  Pt seen for dysphagia tx follow up. Pt requesting to advance diet. Upper denture in place. Pt sitting upright in bed, trialed w/ dysphagia 2 diet at lunch. Objective:  Pt on trach collar, PMV in place. Pt sitting upright in bed, trialed w/ chopped tilapia, chopped green beans, mashed potatoes and HT cran juice. Pt took 1 bite of mashed potatoes and 1 bite of chopped fish, then stated she can't eat it b/c she doesn't have an appetite. I explained to her that I could not advance her diet w/o seeing how she does w/ a larger sample of the chopped foods. Pt agreed and continued to eat several bites of fish, some green beans and mashed potatoes. Pt w/ slow, prolonged mastication/manipulation of mech soft foods. Decreased bolus formation. Pt w/ occas coughing throughout w/ foods. Pt only took a few sips of HTL w/o immed coughing, reminded to use chin tuck strategy. PMV removed, pt w/ strong volitional cough, productive of thick tannish mucous w/o po residue noted. Pt requested to keep PMV off for a while. O2 sats were 89-90% after coughing out mucous, but slowly increased to 95%. TT to dietician regarding adjustment of TF to facilitate appetite and increase po intake. Pt aware.       Assessment:  Pt continues w/ prolonged oral processing of textured foods, but no overt aspiration noted - po residue in tracheal secretions, appears appropriate to advance diet to dysphagia 2  w/ HTL    Plan/Recommendations:  Rec advance diet to dysphagia 2 w HTL  Aspiration precautions  RD to rec TF adjustment to facilitate appetite and increase po intake   Will cont to follow      Gretchen Boo CCC-SLP  Speech Pathologist  Available via  tiger text

## 2023-10-31 NOTE — ASSESSMENT & PLAN NOTE
Cuffless trach placed 9/17, transitioned to cuffed on 10/3. Oxygen requirements not improving. For mental health patient is on buspirone, quetiapine, and sertraline. For pain, gabapentin, oxycodone scheduled and prn, prn oxycodone mainly utilized for increased pain during chemo and after a bronch. On Guaifenesin, Atrovent and Xopenex for airway maintenance. No improvement in bilateral consolidation or atelectasis and groundglass opacities on repeat CT and chest x-rays. Patient not receiving proper positive pressure to open up atelectatic lung. Bronchial cx with 3 colonies CoNS. PCP PCR invalid, repeat negative. CTCAP indicates additional groundglass opacity with paraseptal emphysema, which may be contributing to inability to remain off vent. SBTs have been unsuccessful to place patient on trach collar since return to ICU. Patient tolerated high flow all day and overnight. Back on vent overnight, failed trach collar o/n. CXR 10/19 appears unchanged from 10/16 just not good breath during image, less of right lung visualized this time. After 24 hours trial and patient not desaturating or become dyspneic, she was transferred to level 2 step down. She was desatting on HFNC and VBGs showed hypercarbia and hypoxia, due to which she was transferred to critical care. She is being titrated down to 50% FiO2, off of vent since 4 days. Unable to maintain good sats with lower FiO2. Nurse reported thick yellow/green secretions on suctioning. 10/30 she is on HFNC 58L at 88%. Recent Labs     10/31/23  0509   PHVEN 7.324   [id number] 65.9*   PO2VEN 30.4*   [id number] 33.5*   BEVEN 5.4       Plan:  Cefepime and Vancomycin started. Day 2. Continue trach collar during the day and at night. Wean down HFNC to a goal FiO2 of 50%  Maintain cuff overnight from 9 pm to 6 am for positive pressure, can open cuff during the day for patient to speak. Discussed with CM.  New jannette for STR, she will be able to come to the hospital for chemotherapy infusions and be admitted for 4 days every 3 weeks.

## 2023-10-31 NOTE — ASSESSMENT & PLAN NOTE
Lab Results   Component Value Date    CREATININE 1.33 (H) 10/31/2023    CREATININE 1.32 (H) 10/30/2023    CREATININE 1.49 (H) 10/29/2023       Likely prerenal.  Second to poor oral intake versus poor urine output. Baseline creatinine 1 to 1.2.     Cr at baseline  Plan:  Urinary retention protocol, Daily weights, I/O  Trend Cr daily

## 2023-10-31 NOTE — NUTRITION
Recommend switching to bolus TF regimen to promote PO intake at meal times. Goal: Bolus feeds of 228 ml TwoCalHN given 4 times per day with 95 ml water flushes before and after TF administration. Recommend to give after mealtimes and one in the evening. Start with 120 ml for first feed prior to advancing to goal rate. EN regimen continues to meet 100% of estimated nutritional needs providing 1824 kcals, 76 g protein, and 1398 ml total fluid.  Will monitor closely for PO intake and ability to decrease kcals provided from TF.

## 2023-10-31 NOTE — ASSESSMENT & PLAN NOTE
Wt Readings from Last 3 Encounters:   10/31/23 75.8 kg (167 lb 1.7 oz)   09/07/23 74.6 kg (164 lb 6.4 oz)   08/30/23 73.3 kg (161 lb 9.6 oz)     Lab Results   Component Value Date    LVEF 60 08/28/2023     (H) 10/28/2023     (H) 09/29/2023     Echo 8/28/23: LVEF 60%.        Plan:  K > 4   Measure I/O

## 2023-10-31 NOTE — ASSESSMENT & PLAN NOTE
Lab Results   Component Value Date    CHOLESTEROL 118 10/09/2023    CHOLESTEROL 203 (H) 01/24/2023    CHOLESTEROL 177 08/11/2022     Lab Results   Component Value Date    HDL 14 (L) 10/09/2023    HDL 45 (L) 01/24/2023    HDL 37 (L) 08/11/2022     Lab Results   Component Value Date    TRIG 144 10/09/2023    TRIG 166 (H) 09/16/2023    TRIG 160 (H) 01/24/2023     Lab Results   Component Value Date    NONHDLC 104 10/09/2023    [address] 146 07/12/2018    [address] (H) 04/29/2013     Continue outpatient diet and lifestyle modification.

## 2023-10-31 NOTE — ASSESSMENT & PLAN NOTE
POA in [place] ([place]) ED: Swollen tongue, soft and hard palate with severe angioedema. Decadron 10 mg, Benadryl 25 mg given. 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 Plan:  Cuffless trach 9/17, cuffed Shiley XLT #7 10/3  See acute respiratory failure and oropharyngeal mass above  Continue to wean vent.

## 2023-10-31 NOTE — PLAN OF CARE
Problem: MOBILITY - ADULT  Goal: Maintain or return to baseline ADL function  Description: INTERVENTIONS:  -  Assess patient's ability to carry out ADLs; assess patient's baseline for ADL function and identify physical deficits which impact ability to perform ADLs (bathing, care of mouth/teeth, toileting, grooming, dressing, etc.)  - Assess/evaluate cause of self-care deficits   - Assess range of motion  - Assess patient's mobility; develop plan if impaired  - Assess patient's need for assistive devices and provide as appropriate  - Encourage maximum independence but intervene and supervise when necessary  - Involve family in performance of ADLs  - Assess for home care needs following discharge   - Consider OT consult to assist with ADL evaluation and planning for discharge  - Provide patient education as appropriate  Outcome: Progressing  Goal: Maintains/Returns to pre admission functional level  Description: INTERVENTIONS:  - Perform BMAT or MOVE assessment daily.   - Set and communicate daily mobility goal to care team and patient/family/caregiver. - Collaborate with rehabilitation services on mobility goals if consulted  - Perform Range of Motion  times a day. - Reposition patient every 2 hours.   - Dangle patient  times a day  - Stand patient  times a day  - Ambulate patient  times a day  - Out of bed to chair  times a day   - Out of bed for meals  times a day  - Out of bed for toileting  - Record patient progress and toleration of activity level   Outcome: Progressing     Problem: Prexisting or High Potential for Compromised Skin Integrity  Goal: Skin integrity is maintained or improved  Description: INTERVENTIONS:  - Identify patients at risk for skin breakdown  - Assess and monitor skin integrity  - Assess and monitor nutrition and hydration status  - Monitor labs   - Assess for incontinence   - Turn and reposition patient  - Assist with mobility/ambulation  - Relieve pressure over bony prominences  - Avoid friction and shearing  - Provide appropriate hygiene as needed including keeping skin clean and dry  - Evaluate need for skin moisturizer/barrier cream  - Collaborate with interdisciplinary team   - Patient/family teaching  - Consider wound care consult   Outcome: Progressing     Problem: Nutrition/Hydration-ADULT  Goal: Nutrient/Hydration intake appropriate for improving, restoring or maintaining nutritional needs  Description: Monitor and assess patient's nutrition/hydration status for malnutrition. Collaborate with interdisciplinary team and initiate plan and interventions as ordered. Monitor patient's weight and dietary intake as ordered or per policy. Utilize nutrition screening tool and intervene as necessary. Determine patient's food preferences and provide high-protein, high-caloric foods as appropriate.      INTERVENTIONS:  - Monitor oral intake, urinary output, labs, and treatment plans  - Assess nutrition and hydration status and recommend course of action  - Evaluate amount of meals eaten  - Assist patient with eating if necessary   - Allow adequate time for meals  - Recommend/ encourage appropriate diets, oral nutritional supplements, and vitamin/mineral supplements  - Order, calculate, and assess calorie counts as needed  - Recommend, monitor, and adjust tube feedings and TPN/PPN based on assessed needs  - Assess need for intravenous fluids  - Provide specific nutrition/hydration education as appropriate  - Include patient/family/caregiver in decisions related to nutrition  Outcome: Progressing     Problem: PAIN - ADULT  Goal: Verbalizes/displays adequate comfort level or baseline comfort level  Description: Interventions:  - Encourage patient to monitor pain and request assistance  - Assess pain using appropriate pain scale  - Administer analgesics based on type and severity of pain and evaluate response  - Implement non-pharmacological measures as appropriate and evaluate response  - Consider cultural and social influences on pain and pain management  - Notify physician/advanced practitioner if interventions unsuccessful or patient reports new pain  Outcome: Progressing     Problem: INFECTION - ADULT  Goal: Absence or prevention of progression during hospitalization  Description: INTERVENTIONS:  - Assess and monitor for signs and symptoms of infection  - Monitor lab/diagnostic results  - Monitor all insertion sites, i.e. indwelling lines, tubes, and drains  - Monitor endotracheal if appropriate and nasal secretions for changes in amount and color  - [place] appropriate cooling/warming therapies per order  - Administer medications as ordered  - Instruct and encourage patient and family to use good hand hygiene technique  - Identify and instruct in appropriate isolation precautions for identified infection/condition  Outcome: Progressing  Goal: Absence of fever/infection during neutropenic period  Description: INTERVENTIONS:  - Monitor WBC    Outcome: Progressing     Problem: SAFETY ADULT  Goal: Maintain or return to baseline ADL function  Description: INTERVENTIONS:  -  Assess patient's ability to carry out ADLs; assess patient's baseline for ADL function and identify physical deficits which impact ability to perform ADLs (bathing, care of mouth/teeth, toileting, grooming, dressing, etc.)  - Assess/evaluate cause of self-care deficits   - Assess range of motion  - Assess patient's mobility; develop plan if impaired  - Assess patient's need for assistive devices and provide as appropriate  - Encourage maximum independence but intervene and supervise when necessary  - Involve family in performance of ADLs  - Assess for home care needs following discharge   - Consider OT consult to assist with ADL evaluation and planning for discharge  - Provide patient education as appropriate  Outcome: Progressing  Goal: Maintains/Returns to pre admission functional level  Description: INTERVENTIONS:  - Perform BMAT or MOVE assessment daily.   - Set and communicate daily mobility goal to care team and patient/family/caregiver. - Collaborate with rehabilitation services on mobility goals if consulted  - Perform Range of Motion  times a day. - Reposition patient every 2 hours.   - Dangle patient  times a day  - Stand patient  times a day  - Ambulate patient  times a day  - Out of bed to chair  times a day   - Out of bed for meals  times a day  - Out of bed for toileting  - Record patient progress and toleration of activity level   Outcome: Progressing  Goal: Patient will remain free of falls  Description: INTERVENTIONS:  - Educate patient/family on patient safety including physical limitations  - Instruct patient to call for assistance with activity   - Consult OT/PT to assist with strengthening/mobility   - Keep Call bell within reach  - Keep bed low and locked with side rails adjusted as appropriate  - Keep care items and personal belongings within reach  - Initiate and maintain comfort rounds  - Make Fall Risk Sign visible to staff  - Offer Toileting every 2 Hours, in advance of need  - Initiate/Maintain bed alarm  - Obtain necessary fall risk management equipment:   - Apply yellow socks and bracelet for high fall risk patients  - Consider moving patient to room near nurses station  Outcome: Progressing     Problem: DISCHARGE PLANNING  Goal: Discharge to home or other facility with appropriate resources  Description: INTERVENTIONS:  - Identify barriers to discharge w/patient and caregiver  - Arrange for needed discharge resources and transportation as appropriate  - Identify discharge learning needs (meds, wound care, etc.)  - Arrange for interpretive services to assist at discharge as needed  - Refer to Case Management Department for coordinating discharge planning if the patient needs post-hospital services based on physician/advanced practitioner order or complex needs related to functional status, cognitive ability, or social support system  Outcome: Progressing     Problem: Knowledge Deficit  Goal: Patient/family/caregiver demonstrates understanding of disease process, treatment plan, medications, and discharge instructions  Description: Complete learning assessment and assess knowledge base.   Interventions:  - Provide teaching at level of understanding  - Provide teaching via preferred learning methods  Outcome: Progressing     Problem: RESPIRATORY - ADULT  Goal: Achieves optimal ventilation and oxygenation  Description: INTERVENTIONS:  - Assess for changes in respiratory status  - Assess for changes in mentation and behavior  - Position to facilitate oxygenation and minimize respiratory effort  - Oxygen administered by appropriate delivery if ordered  - Initiate smoking cessation education as indicated  - Encourage broncho-pulmonary hygiene including cough, deep breathe, Incentive Spirometry  - Assess the need for suctioning and aspirate as needed  - Assess and instruct to report SOB or any respiratory difficulty  - Respiratory Therapy support as indicated  Outcome: Progressing     Problem: GENITOURINARY - ADULT  Goal: Maintains or returns to baseline urinary function  Description: INTERVENTIONS:  - Assess urinary function  - Encourage oral fluids to ensure adequate hydration if ordered  - Administer IV fluids as ordered to ensure adequate hydration  - Administer ordered medications as needed  - Offer frequent toileting  - Follow urinary retention protocol if ordered  Outcome: Progressing  Goal: Absence of urinary retention  Description: INTERVENTIONS:  - Assess patient’s ability to void and empty bladder  - Monitor I/O  - Bladder scan as needed  - Discuss with physician/AP medications to alleviate retention as needed  - Discuss catheterization for long term situations as appropriate  Outcome: Progressing     Problem: METABOLIC, FLUID AND ELECTROLYTES - ADULT  Goal: Electrolytes maintained within normal limits  Description: INTERVENTIONS:  - Monitor labs and assess patient for signs and symptoms of electrolyte imbalances  - Administer electrolyte replacement as ordered  - Monitor response to electrolyte replacements, including repeat lab results as appropriate  - Instruct patient on fluid and nutrition as appropriate  Outcome: Progressing  Goal: Fluid balance maintained  Description: INTERVENTIONS:  - Monitor labs   - Monitor I/O and WT  - Instruct patient on fluid and nutrition as appropriate  - Assess for signs & symptoms of volume excess or deficit  Outcome: Progressing  Goal: Glucose maintained within target range  Description: INTERVENTIONS:  - Monitor Blood Glucose as ordered  - Assess for signs and symptoms of hyperglycemia and hypoglycemia  - Administer ordered medications to maintain glucose within target range  - Assess nutritional intake and initiate nutrition service referral as needed  Outcome: Progressing

## 2023-10-31 NOTE — ASSESSMENT & PLAN NOTE
Lab Results   Component Value Date    EGFR 40 10/31/2023    EGFR 40 10/30/2023    EGFR 35 10/29/2023    CREATININE 1.33 (H) 10/31/2023    CREATININE 1.32 (H) 10/30/2023    CREATININE 1.49 (H) 10/29/2023     Baseline Cr between 0.75-1.1  Initial HERIBERTO felt 2/2 prerenal azotemia 2/2 diuresis, reduced PO intake +/- ATN. Patient received 2 doses of Bumex IV 2 g as she had not been responding appropriately to IV 40 Lasix daily. Creatinine went up by 0.5 meeting criteria for an HERIBERTO. This may be prerenal intrinsic or postrenal.  Potential prerenal causes include reduced kidney perfusion due to lisinopril. Intrinsic can also be lisinopril due to ATN, AIN from chemo medications, TLS, crystaline nephropathy from acyclovir, rituxan nephrotoxicity, filgrastim glomerulonephritis. Post renal obstructive could be from urine retention from vincristine or cyclophosphamide bladder fibrosis. Suspect most likely due to medications as a combination of prerenal and intrinsic. FENa was 0.2%, UA shows no casts or signs of infection, urine eosinophils 0%. Patient likely has prerenal HERIBERTO from volume depletion. Received 2 L NS and 1 pRBC and cr went up by 0.07 still and Na and Cl went down, suspect that volume prevented further cr rise and free water excretion impaired by kidney function, allowing hyponatremia to increase. Creatinine increase is likely plateaued and went down the following day. Suspect that now that nephrotoxic medications have been stopped she responded well to Lasix and creatinine downtrended. HERIBERTO now resolved. Will be cautious about nephrotoxins and monitor as restarting EPOCH and ppx. Patient gets volume depleted and overloaded very easily. Especially from chemo. 10/19 Heriberto as cr went up by 0.3 in 48 hours from 0.82 on 10/17 to 1.22 on 10/19. Suspect this is prerenal hypovolemic, will see if patient improves with fluids. She gets overloaded easily so if no improvement suspect CRS again.      10/29 HERIBERTO cr went up by 0.3 again. She has 922 ml UOP in last 24 hours. PO likely from lasix 40mg IV given yesterday. She is not hypovolemic. Plan:   Continue to monitor UO/renal function. Avoid nephrotoxic agents  Continue to monitor a.m. BMP.

## 2023-10-31 NOTE — PROGRESS NOTES
[facility] [facility]  Progress Note  Name: Radha Garcia  MRN: [id number]  Unit/Bed#: ICU 03 I Date of Admission: 9/13/2023   Date of Service: 10/31/2023 I Hospital Day: 50    Assessment/Plan   * Acute respiratory failure with hypoxia secondary to oropharyngeal mass  Assessment & Plan  Cuffless trach placed 9/17, transitioned to cuffed on 10/3. Oxygen requirements not improving. For mental health patient is on buspirone, quetiapine, and sertraline. For pain, gabapentin, oxycodone scheduled and prn, prn oxycodone mainly utilized for increased pain during chemo and after a bronch. On Guaifenesin, Atrovent and Xopenex for airway maintenance. No improvement in bilateral consolidation or atelectasis and groundglass opacities on repeat CT and chest x-rays. Patient not receiving proper positive pressure to open up atelectatic lung. Bronchial cx with 3 colonies CoNS. PCP PCR invalid, repeat negative. CTCAP indicates additional groundglass opacity with paraseptal emphysema, which may be contributing to inability to remain off vent. SBTs have been unsuccessful to place patient on trach collar since return to ICU. Patient tolerated high flow all day and overnight. Back on vent overnight, failed trach collar o/n. CXR 10/19 appears unchanged from 10/16 just not good breath during image, less of right lung visualized this time. After 24 hours trial and patient not desaturating or become dyspneic, she was transferred to level 2 step down. She was desatting on HFNC and VBGs showed hypercarbia and hypoxia, due to which she was transferred to critical care. She is being titrated down to 50% FiO2, off of vent since 4 days. Unable to maintain good sats with lower FiO2. Nurse reported thick yellow/green secretions on suctioning. 10/30 she is on HFNC 58L at 88%. Recent Labs     10/31/23  0509   PHVEN 7.324   [id number] 65.9*   PO2VEN 30.4*   [id number] 33.5*   BEVEN 5.4       Plan:  Cefepime and Vancomycin started.  Day 2.  Continue trach collar during the day and at night. Wean down HFNC to a goal FiO2 of 50%  Maintain cuff overnight from 9 pm to 6 am for positive pressure, can open cuff during the day for patient to speak. Discussed with CM. New jannette for STR, she will be able to come to the hospital for chemotherapy infusions and be admitted for 4 days every 3 weeks. Large cell lymphoma ([address])  Assessment & Plan  Large B-cell lymphoma, stage II. First chemo cycle 9/22 4 days of R-EPOCH. 9/27 Rituxan. 9/28 Filgrastim. Acyclovir, Zyloprim, Diflucan, Levaquin, Zofran, Bactrim for chemo prophylaxis, all discontinued with heme-onc's approval as Nafisa reached 4200. As per heme/onc attending pancytopenia is expected with her chemotherapy. Restarted all ppx 10/13 for [facility] cycle 2, which was done over 4 days. Ppx and filgrastim can be discontinued when patient is no longer neutropenic after this cycle again. Tunneled line in R IJ in place. She received cytoxan on 10/19. Neutropenic precautions will be needed in a few days    Plan:  Transfuse blood products as needed. Keep Hgb>7 and Plt>20 for chemo   Continue rituximab infusions as per heme/onc plan. Receiving daily filgrastim. See acute respiratory failure above    Oropharyngeal mass  Assessment & Plan  9/14 Neck/ soft tissue CT: Large enhancing soft tissue mass identified within the left neck involving multiple spaces. Centered within the left oropharynx with extensions as described above into multiple spaces. This results in significant airway compromise. 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 Small level 3/level 4 nodes are seen within the neck. Tracheostomy by ENT. Replaced on 9/26. H/o tobacco abuse, daily smoker. On nicotine while inpatient, confirmed with patient she needs nicotine patch. CT soft tissue neck shows reduced mass effect from 9/14 to 10/13.  Can consider reducing trach size if continues to improve    Plan:  ENT and heme onc following  Will titrate NRT weekly  As per speech therapist recommendations she will continue puree foods. Not ready to advance diet. Continue PEG tube feeds for nutrition. See acute respiratory failure and large B cell lymphoma above      CKD (chronic kidney disease) stage 3, GFR 30-59 ml/min [facility])  Assessment & Plan  Lab Results   Component Value Date    EGFR 40 10/31/2023    EGFR 40 10/30/2023    EGFR 35 10/29/2023    CREATININE 1.33 (H) 10/31/2023    CREATININE 1.32 (H) 10/30/2023    CREATININE 1.49 (H) 10/29/2023     Baseline Cr between 0.75-1.1  Initial PO felt 2/2 prerenal azotemia 2/2 diuresis, reduced PO intake +/- ATN. Patient received 2 doses of Bumex IV 2 g as she had not been responding appropriately to IV 40 Lasix daily. Creatinine went up by 0.5 meeting criteria for an PO. This may be prerenal intrinsic or postrenal.  Potential prerenal causes include reduced kidney perfusion due to lisinopril. Intrinsic can also be lisinopril due to ATN, AIN from chemo medications, TLS, crystaline nephropathy from acyclovir, rituxan nephrotoxicity, filgrastim glomerulonephritis. Post renal obstructive could be from urine retention from vincristine or cyclophosphamide bladder fibrosis. Suspect most likely due to medications as a combination of prerenal and intrinsic. FENa was 0.2%, UA shows no casts or signs of infection, urine eosinophils 0%. Patient likely has prerenal PO from volume depletion. Received 2 L NS and 1 pRBC and cr went up by 0.07 still and Na and Cl went down, suspect that volume prevented further cr rise and free water excretion impaired by kidney function, allowing hyponatremia to increase. Creatinine increase is likely plateaued and went down the following day. Suspect that now that nephrotoxic medications have been stopped she responded well to Lasix and creatinine downtrended. PO now resolved. Will be cautious about nephrotoxins and monitor as restarting EPOCH and ppx.  Patient gets volume depleted and overloaded very easily. Especially from chemo. 10/19 Heriberto as cr went up by 0.3 in 48 hours from 0.82 on 10/17 to 1.22 on 10/19. Suspect this is prerenal hypovolemic, will see if patient improves with fluids. She gets overloaded easily so if no improvement suspect CRS again. 10/29 HERIBERTO cr went up by 0.3 again. She has 922 ml UOP in last 24 hours. HERIBERTO likely from lasix 40mg IV given yesterday. She is not hypovolemic. Plan:   Continue to monitor UO/renal function. Avoid nephrotoxic agents  Continue to monitor a.m. BMP. RML pneumonia-resolved as of 9/22/2023  Assessment & Plan  9/13 CT PE study: Patchy consolidation right middle lobe, new from 8/25/2023, compatible with pneumonia. No leukocytosis, no fever/chills. Positive for sore throat, cough. New onset pneumonia after recent hospitalization and antibiotics treatment. 9/14: Bronchoscopy/ ABL. MRSA negative. ABL revealed 2+ Growth of Candida albicans, suspected contaminant. HERIBERTO (acute kidney injury) (HCC)-resolved as of 9/21/2023  Assessment & Plan  Lab Results   Component Value Date    CREATININE 1.33 (H) 10/31/2023    CREATININE 1.32 (H) 10/30/2023    CREATININE 1.49 (H) 10/29/2023       Likely prerenal.  Second to poor oral intake versus poor urine output. Baseline creatinine 1 to 1.2. Cr at baseline  Plan:  Urinary retention protocol, Daily weights, I/O  Trend Cr daily    Pancytopenia due to chemotherapy [facility])  Assessment & Plan  Patient received 1 PRBC transfusion on 10/5. Hemoglobin 8.2 s/p 1 PRBC transfusion on 10/25. B/L is 12-14. No sites of bleeding, no black stools. Iron panel indicative of inflammatory anemia. Normal Vitamin B12 levels, negative hemolysis smear. Folate is low 5.5. Plan:  Trend hemoglobin and transfuse for <7. As per heme/onc transfuse irradiated and leukoreduced blood products.   Trend platelets  Folic acid 1mg daily supplementation  As per my conversation with Heme/oncology attending, patient is expected to have chemotherapy associated pancytopenia. No further anemia w/u required. (HFpEF) heart failure with preserved ejection fraction [facility])  Assessment & Plan  Wt Readings from Last 3 Encounters:   10/31/23 75.8 kg (167 lb 1.7 oz)   09/07/23 74.6 kg (164 lb 6.4 oz)   08/30/23 73.3 kg (161 lb 9.6 oz)     Lab Results   Component Value Date    LVEF 60 08/28/2023     (H) 10/28/2023     (H) 09/29/2023     Echo 8/28/23: LVEF 60%. Plan:  K > 4   Measure I/O      Angioedema  Assessment & Plan  POA in [place] [facility] ED: Swollen tongue, soft and hard palate with severe angioedema. Decadron 10 mg, Benadryl 25 mg given. 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 Plan:  Cuffless trach 9/17, cuffed Shiley XLT #7 10/3  See acute respiratory failure and oropharyngeal mass above  Continue to wean vent. Ambulatory dysfunction  Assessment & Plan  2/2 Impacted by chronic pain syndrome + prolonged hospital stay. Continue OOB with PT. PT rec post acute rehab however reportedly Cannot get LTACH placement while getting chemo per CM  She is aware STR SNFs continue to follow and treatment can be done from a SNF level of care. PT would need to be on trach collar for at least 72 hours with no need for the vent. Aware that pt would need to be around 28% FiO2     Pain syndrome, chronic  Assessment & Plan  Home regimen: Oxycodone 5 mg BID, Tylenol 650 mg q8 prn. Gabapentin 600 mg TID. Medical marijuana. Lumbar radiculopathy and impaired ambulatory dysfunction secondary to pain. Has bowel regimen and is having bowel movements daily. Patient required additional pain management during previous cycle and has some additional pain from tunneled line placement    Plan:  Continue gabapentin 300 mg TID, oxycodone 5 mg TID, prn Tylenol 650 mg q6.     Oxycodone 5mg every 8 hours  Oxycodone 2.5 mg q6 PRN    Benign essential hypertension  Assessment & Plan  Home regimen Coreg 12.5 mg BID, Amlodipine 10 mg daily, and lisinopril 40 mg. All discontinued at this time secondary to hypotension and PO since cycle 1. but stable currently without any antihypertensives. Systolic persistently elevated and tachycardic, potentially secondary to pain. Patient was more hypotensive during last chemo. Coreg contraindicated during chemo, since cycles are every other week, would be better to stick with lopressor during duration of therapy as opposed to switching constantly. Plan:  Monitor vitals. Continue Norvasc 10 and lopressor 25 bid  Prn hydralazine for SBP > 160    Hyperlipidemia-resolved as of 10/26/2023  Assessment & Plan  Lab Results   Component Value Date    CHOLESTEROL 118 10/09/2023    CHOLESTEROL 203 (H) 01/24/2023    CHOLESTEROL 177 08/11/2022     Lab Results   Component Value Date    HDL 14 (L) 10/09/2023    HDL 45 (L) 01/24/2023    HDL 37 (L) 08/11/2022     Lab Results   Component Value Date    TRIG 144 10/09/2023    TRIG 166 (H) 09/16/2023    TRIG 160 (H) 01/24/2023     Lab Results   Component Value Date    NONHDLC 104 10/09/2023    [address] 146 07/12/2018    [address] 207 (H) 04/29/2013     Continue outpatient diet and lifestyle modification. Depression  Assessment & Plan  Patient on Zoloft 75 daily and Seroquel hs  Patient is depressed, multiple family/palliative discussions. patient is firm that she wants to live and to fight, she is just tired. Currently goal is disease treatment oriented. Full code. No SI/HI ideations. Palliative signed off, will reconsult if patient changes her mind regarding wishes. On 10/20/23 she decided to leave A. Now waiting for CM and machine     Generalized abdominal pain  Assessment & Plan  Patient reports abdominal pain which is unchanged since 9/22 no guarding on exam. Takes Famotidine for GERD at home. Alk phos 10/2 145, 10/20 79.  CTCAP reveals complex right upper pole renal lesion requiring possible outpatient MRI    Continue Famotidine  On bowel regimen with Senna and Miralax prn    Chronic Superficial thrombophlebitis  Assessment & Plan  Right forearm soreness. RUE US: No acute or chronic deep vein thrombosis. 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 PO not severe enough to require renal dosing at this time, will continue to monitor and adjust dosing as needed. Continue DVT ppx with Lovenox 40    Blister (nonthermal) of left forearm, sequela  Assessment & Plan  Blisters of left upper extremity healing, no signs of infection, continue daily wound care. COPD without exacerbation (HCC)-resolved as of 9/26/2023  Assessment & Plan  Continue vent with nebs. Wean off vent. Encephalopathy-resolved as of 9/21/2023  Assessment & Plan  9/19- Pt appeared to be AAO x3. Improving. ICU Core Measures     Vented Patient  VAP Bundle  VAP bundle ordered     A: Assess, Prevent, and Manage Pain Has pain been assessed? Yes  Need for changes to pain regimen? No   B: Both Spontaneous Awakening Trials (SATs) and Spontaneous Breathing Trials (SBTs) Plan to perform spontaneous awakening trial today? N/A      C: Choice of Sedation RASS Goal: 0 Alert and Calm  Need for changes to sedation or analgesia regimen? No   D: Delirium CAM-ICU: Negative   E: Early Mobility  Plan for early mobility? Yes   F: Family Engagement Plan for family engagement today? Yes       Antibiotic Review: Continue broad spectrum secondary to severity of illness. Review of Invasive Devices:      Central access plan: Medications requiring central line      Prophylaxis:  VTE VTE covered by:  enoxaparin, Subcutaneous, 40 mg at 10/31/23 0857       Stress Ulcer  covered byfamotidine (PEPCID) tablet 20 mg [[id number]]       Subjective   HPI/24hr events: No overnight events reported. Patient is currently titrated f    Review of systems   No complaints reported by nursing. Patient did not want to speak to me today.          Objective                            Vitals I/O      Most Recent Min/Max in 24hrs   Temp 98.6 °F (37 °C) Temp  Min: 97.4 °F (36.3 °C)  Max: 98.9 °F (37.2 °C)   Pulse 87 Pulse  Min: 85  Max: 113   Resp 22 Resp  Min: 16  Max: 36   /62 BP  Min: 92/55  Max: 121/62   O2 Sat 99 % SpO2  Min: 87 %  Max: 99 %      Intake/Output Summary (Last 24 hours) at 10/31/2023 1036  Last data filed at 10/31/2023 0901  Gross per 24 hour   Intake 904 ml   Output 1709 ml   Net -805 ml         Diet Enteral/Parenteral; Tube Feeding with Oral Diet; Two Telly HN; Continuous; 38; 190; Water; Every 4 hours; Dysphagia/Modified Consistency; Dysphagia 1-Pureed; Honey Thick Liquid; Dysphagia 1-Pureed, Honey Thick Liquid     Invasive Monitoring Physical exam    Physical Exam  Neck:      Trachea: Tracheostomy present. Neurological:      Mental Status: She is alert. Vitals reviewed.           Diagnostic Studies      EKG: NSR  Imaging: N/A     Medications:  Scheduled PRN   acyclovir, 400 mg, BID  amLODIPine, 10 mg, Daily  busPIRone, 30 mg, TID  cefepime, 2,000 mg, Q12H  chlorhexidine, 15 mL, Q12H ASHLEY  enoxaparin, 40 mg, Q24H ASHLEY  famotidine, 20 mg, BID  fluconazole, 485 mg, Daily  folic acid, 1 mg, Daily  gabapentin, 300 mg, TID  levalbuterol, 1.25 mg, TID   And  ipratropium, 0.5 mg, TID  levofloxacin, 250 mg, Q24H ASHLEY  melatonin, 3 mg, HS  metoprolol tartrate, 25 mg, Q12H [facility]  nicotine, 7 mg, Daily  oxyCODONE, 5 mg, Q8H  QUEtiapine, 50 mg, HS  senna, 8.8 mg, HS  sertraline, 75 mg, Daily  sodium chloride, 4 mL, TID  sulfamethoxazole-trimethoprim, 1 tablet, Once per day on Mon Wed Fri  vancomycin, 750 mg, Q24H      alteplase, 2 mg, Q1MIN PRN  alteplase, 2 mg, Q1MIN PRN  ondansetron, 4 mg, Q8H PRN  oxyCODONE, 2.5 mg, Q6H PRN       Continuous          Labs:    CBC    Recent Labs     10/30/23  0553 10/31/23  0509   WBC 18.52* 13.87*   HGB 8.3* 7.8*   HCT 26.4* 25.4*    154   BANDSPCT 6  --      BMP    Recent Labs     10/30/23  0553 10/31/23  0509   SODIUM 137 135   K 5.1 5.0   CL 99 100   CO2 32 31   AGAP 6 4   BUN 31* 34*   CREATININE 1.32* 1.33*   CALCIUM 10.1 9.7       Coags    No recent results     Additional Electrolytes  Recent Labs     10/30/23  0553 10/31/23  0509   MG 2.2 2.3   PHOS 4.9* 5.4*   CAIONIZED 1.27  --           Blood Gas    No recent results  Recent Labs     10/31/23  0509   PHVEN 7.324   [id number] 65.9*   PO2VEN 30.4*   [id number] 33.5*   BEVEN 5.4    LFTs  Recent Labs     10/30/23  0553 10/31/23  0509   ALT 33 28   AST 21 16   ALKPHOS 115* 99   ALB 3.0* 2.9*   TBILI 0.26 0.25       Infectious  Recent Labs     10/29/23  1249   PROCALCITONI 0.24     Glucose  Recent Labs     10/30/23  0553 10/31/23  0509   GLUC 113 121             Jose Alfredo Sosa MD

## 2023-10-31 NOTE — ASSESSMENT & PLAN NOTE
Large B-cell lymphoma, stage II. First chemo cycle 9/22 4 days of R-EPOCH. 9/27 Rituxan. 9/28 Filgrastim. Acyclovir, Zyloprim, Diflucan, Levaquin, Zofran, Bactrim for chemo prophylaxis, all discontinued with heme-onc's approval as Nafisa reached 4200. As per heme/onc attending pancytopenia is expected with her chemotherapy. Restarted all ppx 10/13 for [facility] cycle 2, which was done over 4 days. Ppx and filgrastim can be discontinued when patient is no longer neutropenic after this cycle again. Tunneled line in R IJ in place. She received cytoxan on 10/19. Neutropenic precautions will be needed in a few days    Plan:  Transfuse blood products as needed. Keep Hgb>7 and Plt>20 for chemo   Continue rituximab infusions as per heme/onc plan. Receiving daily filgrastim.   See acute respiratory failure above

## 2023-10-31 NOTE — QUICK NOTE
REVIEW of events  8/28 Echo: LVEF 60%, G1DD  9/13 CT PE study: Patchy consolidation right middle lobe, new   9/14 Bronch, CT neck soft tissue: Enlarging left oropharynx mass with extensions. Possible head and neck cancer  9/16 D/c decadron (10 dose), received enema, bronchial culture (+) Candida Albicans, AFB (-), MRSA (-), Blood cultures (-), throat culture (-), wound culture Pseudomycelia +,   9/17 hypertensive, responded to hydralazine 5mg IV x1 in night, Trach placed by ENT, bx obtained: Positive for malignancy, favor poorly differentiated carcinoma. Cannot entirely exclude a high grade large cell lymphoma. Portion of specimen submitted for flow cytometry. 9/18 Fungal culture of bronch 9/14 4+ yeast; viral culture negative. Pt off of vent on trach collar for several hours today, pressure assist for night.  9/20 MRI soft tissue neck: Known, large left neck mass described in detail above. 9/20 MRI brain: No evidence of brain metastases. 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 Asymmetric patchy enhancement and restricted diffusion in the left mastoid. The differential includes neoplastic involvement and infection. 9/21 CT A/P: No abdominal lymphadenopathy. Question gallbladder wall thickening versus motion artifact. Chronic T12 lytic lesion with pathologic fracture. 9/21 Preliminary biopsy: large B-cell lymphoma, germinal center B-cell type, c-MYC and BCL-2 double expression. 9/21 HIV, hepatitis negative  9/21 PICC line placed, plan for chemo starting on 9/22 9/22 1st round of Chemo  U/S RUQ: No cholelithiasis. Gallbladder sludge is present with wall thickness upper limits of normal.  9/23 RUE US: negative for DVT, (+) chronic thrombophlebitis in cephalic vein  5/04 Run of [facility] on monitor x 10 beats, resolved on own  10/1 CXR: Airspace disease at both lung bases may represent infiltrates and/or atelectasis.  Diffuse groundglass opacities concerning for infection. 10/2 Received Bumex 2 mg  10/5 Received 1 unit pRBC with repeat Hgb of 7.6  10/6 S/p bronchoscopy. 10/8 Bronch labs, CXR with worsening R sided effusion vs atelectasis. 10/9 Bronch Cx: 3+ colonies coag negative staph, fungal cx negative  10/12 CT C/A/P: Diffuse bilateral groundglass opacities and areas of more dense consolidation could represent pulmonary edema, pneumonia, or diffuse alveolar damage. Complex right upper pole renal lesion for which further evaluation is recommended with MRI on an outpatient basis. Mildly enlarged mediastinal lymph nodes   10/13 Decreased mass effect on the airway  10/13 Received 1 unit pRBC for Hgb > 8 for chemo   10/15 tolerated HFNC today  10/20 Pt attempted to leave AMA - agreed to stay until Monday for supplies  10/23 Hemoglobin had downtrended. No source of bleeding or dark bowels. 10/25 Currently on 12L/ 100% fio2 via trach collar. Spoke with the RT and vent is needed in case she desaturates. No further high flow trials. Reconsented for blood transfusion and gave her 1 unit PRBC since Hb was 6.8 today. 10/26 Per heme/onc patient will require chemotherapy every 3 weeks and required to be admitted for 4 days each time. TT ENT to set outpatient appt with patient. Completed 3 days off of vent. On trach collar 12L at 100%. Trial to titrate down to 70% unsuccessful, as patient refused suctioning and desatted. 10/27 elevated WBC. Discussed with dr Katalina Henry and it is expected to see elevation. CXR unchanged. Goals    Goal for discharge to wean off to less than 50L , 50% fio2 high flow for discharge to facility. Patient needs to be admitted for chemo starting tomorrow for 4 days    Chemo review:    Status post trach on 9/17. Biopsy showed large B-cell lymphoma stage II status post chemo cycle   9/22  4 days of R-EPOCH.  9/27 Rituxan  10/13 for [facility] cycle 2, which was done over 4 days  cytoxan on 10/19. OFF VENT since 10/23.  Intermittently on high flow at night    Received nivestym 300 mc 7 days dcd on 10/26     Prophylaxis:  Bactrim prophylaxis Monday Wednesday Friday,  Acyclovir 400 mg twice daily  Fluconazole 200 mg daily  Levofloxacin 250 mg daily  Lovenox 40 mg daily

## 2023-10-31 NOTE — PROGRESS NOTES
Benjy Maloney is a 77 y.o. female who is currently ordered Vancomycin IV with management by the Pharmacy Consult service. Relevant clinical data and objective / subjective history reviewed. Vancomycin Assessment:  Indication and Goal AUC/Trough: Pneumonia (goal -600, trough >10), -600, trough >10  Clinical Status:  critical care   Micro:     Renal Function:  SCr: 1.33 mg/dL  CrCl: 36.3 mL/min  Renal replacement: Not on dialysis  Days of Therapy: 3  Current Dose:500 mg IV q12h   Vancomycin Plan:  New Dosin mg IV q24h   Estimated AUC: 430 mcg*hr/mL  Estimated Trough: 13.4 mcg/mL  Next Level:  at 0600  Renal Function Monitoring: Daily BMP and [place] will continue to follow closely for s/sx of nephrotoxicity, infusion reactions and appropriateness of therapy. BMP and CBC will be ordered per protocol. We will continue to follow the patient’s culture results and clinical progress daily.     Maite Ochoa, Pharmacist

## 2023-10-31 NOTE — PLAN OF CARE
Problem: MOBILITY - ADULT  Goal: Maintain or return to baseline ADL function  Description: INTERVENTIONS:  -  Assess patient's ability to carry out ADLs; assess patient's baseline for ADL function and identify physical deficits which impact ability to perform ADLs (bathing, care of mouth/teeth, toileting, grooming, dressing, etc.)  - Assess/evaluate cause of self-care deficits   - Assess range of motion  - Assess patient's mobility; develop plan if impaired  - Assess patient's need for assistive devices and provide as appropriate  - Encourage maximum independence but intervene and supervise when necessary  - Involve family in performance of ADLs  - Assess for home care needs following discharge   - Consider OT consult to assist with ADL evaluation and planning for discharge  - Provide patient education as appropriate  Outcome: Progressing  Goal: Maintains/Returns to pre admission functional level  Description: INTERVENTIONS:  - Perform BMAT or MOVE assessment daily.   - Set and communicate daily mobility goal to care team and patient/family/caregiver.    - Collaborate with rehabilitation services on mobility goals if consulted  - Out of bed for toileting  - Record patient progress and toleration of activity level   Outcome: Progressing     Problem: Prexisting or High Potential for Compromised Skin Integrity  Goal: Skin integrity is maintained or improved  Description: INTERVENTIONS:  - Identify patients at risk for skin breakdown  - Assess and monitor skin integrity  - Assess and monitor nutrition and hydration status  - Monitor labs   - Assess for incontinence   - Turn and reposition patient  - Assist with mobility/ambulation  - Relieve pressure over bony prominences  - Avoid friction and shearing  - Provide appropriate hygiene as needed including keeping skin clean and dry  - Evaluate need for skin moisturizer/barrier cream  - Collaborate with interdisciplinary team   - Patient/family teaching  - Consider wound care consult   Outcome: Progressing     Problem: Nutrition/Hydration-ADULT  Goal: Nutrient/Hydration intake appropriate for improving, restoring or maintaining nutritional needs  Description: Monitor and assess patient's nutrition/hydration status for malnutrition. Collaborate with interdisciplinary team and initiate plan and interventions as ordered. Monitor patient's weight and dietary intake as ordered or per policy. Utilize nutrition screening tool and intervene as necessary. Determine patient's food preferences and provide high-protein, high-caloric foods as appropriate.      INTERVENTIONS:  - Monitor oral intake, urinary output, labs, and treatment plans  - Assess nutrition and hydration status and recommend course of action  - Evaluate amount of meals eaten  - Assist patient with eating if necessary   - Allow adequate time for meals  - Recommend/ encourage appropriate diets, oral nutritional supplements, and vitamin/mineral supplements  - Order, calculate, and assess calorie counts as needed  - Recommend, monitor, and adjust tube feedings and TPN/PPN based on assessed needs  - Assess need for intravenous fluids  - Provide specific nutrition/hydration education as appropriate  - Include patient/family/caregiver in decisions related to nutrition  Outcome: Progressing     Problem: PAIN - ADULT  Goal: Verbalizes/displays adequate comfort level or baseline comfort level  Description: Interventions:  - Encourage patient to monitor pain and request assistance  - Assess pain using appropriate pain scale  - Administer analgesics based on type and severity of pain and evaluate response  - Implement non-pharmacological measures as appropriate and evaluate response  - Consider cultural and social influences on pain and pain management  - Notify physician/advanced practitioner if interventions unsuccessful or patient reports new pain  Outcome: Progressing     Problem: INFECTION - ADULT  Goal: Absence or prevention of progression during hospitalization  Description: INTERVENTIONS:  - Assess and monitor for signs and symptoms of infection  - Monitor lab/diagnostic results  - Monitor all insertion sites, i.e. indwelling lines, tubes, and drains  - Monitor endotracheal if appropriate and nasal secretions for changes in amount and color  - [place] appropriate cooling/warming therapies per order  - Administer medications as ordered  - Instruct and encourage patient and family to use good hand hygiene technique  - Identify and instruct in appropriate isolation precautions for identified infection/condition  Outcome: Progressing  Goal: Absence of fever/infection during neutropenic period  Description: INTERVENTIONS:  - Monitor WBC    Outcome: Progressing     Problem: SAFETY ADULT  Goal: Maintain or return to baseline ADL function  Description: INTERVENTIONS:  -  Assess patient's ability to carry out ADLs; assess patient's baseline for ADL function and identify physical deficits which impact ability to perform ADLs (bathing, care of mouth/teeth, toileting, grooming, dressing, etc.)  - Assess/evaluate cause of self-care deficits   - Assess range of motion  - Assess patient's mobility; develop plan if impaired  - Assess patient's need for assistive devices and provide as appropriate  - Encourage maximum independence but intervene and supervise when necessary  - Involve family in performance of ADLs  - Assess for home care needs following discharge   - Consider OT consult to assist with ADL evaluation and planning for discharge  - Provide patient education as appropriate  Outcome: Progressing  Goal: Maintains/Returns to pre admission functional level  Description: INTERVENTIONS:  - Perform BMAT or MOVE assessment daily.   - Set and communicate daily mobility goal to care team and patient/family/caregiver.    - Collaborate with rehabilitation services on mobility goals if consulted  - Out of bed for toileting  - Record patient progress and toleration of activity level   Outcome: Progressing  Goal: Patient will remain free of falls  Description: INTERVENTIONS:  - Educate patient/family on patient safety including physical limitations  - Instruct patient to call for assistance with activity   - Consult OT/PT to assist with strengthening/mobility   - Keep Call bell within reach  - Keep bed low and locked with side rails adjusted as appropriate  - Keep care items and personal belongings within reach  - Initiate and maintain comfort rounds  - Make Fall Risk Sign visible to staff  - Apply yellow socks and bracelet for high fall risk patients  - Consider moving patient to room near nurses station  Outcome: Progressing     Problem: DISCHARGE PLANNING  Goal: Discharge to home or other facility with appropriate resources  Description: INTERVENTIONS:  - Identify barriers to discharge w/patient and caregiver  - Arrange for needed discharge resources and transportation as appropriate  - Identify discharge learning needs (meds, wound care, etc.)  - Arrange for interpretive services to assist at discharge as needed  - Refer to Case Management Department for coordinating discharge planning if the patient needs post-hospital services based on physician/advanced practitioner order or complex needs related to functional status, cognitive ability, or social support system  Outcome: Progressing     Problem: Knowledge Deficit  Goal: Patient/family/caregiver demonstrates understanding of disease process, treatment plan, medications, and discharge instructions  Description: Complete learning assessment and assess knowledge base.   Interventions:  - Provide teaching at level of understanding  - Provide teaching via preferred learning methods  Outcome: Progressing     Problem: RESPIRATORY - ADULT  Goal: Achieves optimal ventilation and oxygenation  Description: INTERVENTIONS:  - Assess for changes in respiratory status  - Assess for changes in mentation and behavior  - Position to facilitate oxygenation and minimize respiratory effort  - Oxygen administered by appropriate delivery if ordered  - Initiate smoking cessation education as indicated  - Encourage broncho-pulmonary hygiene including cough, deep breathe, Incentive Spirometry  - Assess the need for suctioning and aspirate as needed  - Assess and instruct to report SOB or any respiratory difficulty  - Respiratory Therapy support as indicated  Outcome: Progressing     Problem: GENITOURINARY - ADULT  Goal: Maintains or returns to baseline urinary function  Description: INTERVENTIONS:  - Assess urinary function  - Encourage oral fluids to ensure adequate hydration if ordered  - Administer IV fluids as ordered to ensure adequate hydration  - Administer ordered medications as needed  - Offer frequent toileting  - Follow urinary retention protocol if ordered  Outcome: Progressing  Goal: Absence of urinary retention  Description: INTERVENTIONS:  - Assess patient’s ability to void and empty bladder  - Monitor I/O  - Bladder scan as needed  - Discuss with physician/AP medications to alleviate retention as needed  - Discuss catheterization for long term situations as appropriate  Outcome: Progressing     Problem: METABOLIC, FLUID AND ELECTROLYTES - ADULT  Goal: Electrolytes maintained within normal limits  Description: INTERVENTIONS:  - Monitor labs and assess patient for signs and symptoms of electrolyte imbalances  - Administer electrolyte replacement as ordered  - Monitor response to electrolyte replacements, including repeat lab results as appropriate  - Instruct patient on fluid and nutrition as appropriate  Outcome: Progressing  Goal: Fluid balance maintained  Description: INTERVENTIONS:  - Monitor labs   - Monitor I/O and WT  - Instruct patient on fluid and nutrition as appropriate  - Assess for signs & symptoms of volume excess or deficit  Outcome: Progressing  Goal: Glucose maintained within target range  Description: INTERVENTIONS:  - Monitor Blood Glucose as ordered  - Assess for signs and symptoms of hyperglycemia and hypoglycemia  - Administer ordered medications to maintain glucose within target range  - Assess nutritional intake and initiate nutrition service referral as needed  Outcome: Progressing

## 2023-11-01 LAB
ALBUMIN SERPL BCP-MCNC: 2.9 G/DL (ref 3.5–5)
ALP SERPL-CCNC: 94 U/L (ref 34–104)
ALT SERPL W P-5'-P-CCNC: 23 U/L (ref 7–52)
ANION GAP SERPL CALCULATED.3IONS-SCNC: 5 MMOL/L
ARTERIAL PATENCY WRIST A: NO
AST SERPL W P-5'-P-CCNC: 13 U/L (ref 13–39)
BASE EX.OXY STD BLDV CALC-SCNC: 71 % (ref 60–80)
BASE EXCESS BLDV CALC-SCNC: 7.2 MMOL/L
BILIRUB SERPL-MCNC: 0.22 MG/DL (ref 0.2–1)
BUN SERPL-MCNC: 36 MG/DL (ref 5–25)
CALCIUM ALBUM COR SERPL-MCNC: 10.5 MG/DL (ref 8.3–10.1)
CALCIUM SERPL-MCNC: 9.6 MG/DL (ref 8.4–10.2)
CHLORIDE SERPL-SCNC: 100 MMOL/L (ref 96–108)
CO2 SERPL-SCNC: 30 MMOL/L (ref 21–32)
CREAT SERPL-MCNC: 1.18 MG/DL (ref 0.6–1.3)
ERYTHROCYTE [DISTWIDTH] IN BLOOD BY AUTOMATED COUNT: 16.2 % (ref 11.6–15.1)
GFR SERPL CREATININE-BSD FRML MDRD: 46 ML/MIN/1.73SQ M
GLUCOSE SERPL-MCNC: 128 MG/DL (ref 65–140)
HCO3 BLDV-SCNC: 33.8 MMOL/L (ref 24–30)
HCT VFR BLD AUTO: 24.4 % (ref 34.8–46.1)
HGB BLD-MCNC: 7.5 G/DL (ref 11.5–15.4)
MAGNESIUM SERPL-MCNC: 2.2 MG/DL (ref 1.9–2.7)
MCH RBC QN AUTO: 30.7 PG (ref 26.8–34.3)
MCHC RBC AUTO-ENTMCNC: 30.7 G/DL (ref 31.4–37.4)
MCV RBC AUTO: 100 FL (ref 82–98)
O2 CT BLDV-SCNC: 8.3 ML/DL
PCO2 BLDV: 61.4 MM HG (ref 42–50)
PH BLDV: 7.36 [PH] (ref 7.3–7.4)
PHOSPHATE SERPL-MCNC: 4.5 MG/DL (ref 2.3–4.1)
PLATELET # BLD AUTO: 173 THOUSANDS/UL (ref 149–390)
PMV BLD AUTO: 9.7 FL (ref 8.9–12.7)
PO2 BLDV: 40.1 MM HG (ref 35–45)
POTASSIUM SERPL-SCNC: 5.1 MMOL/L (ref 3.5–5.3)
PROT SERPL-MCNC: 5.7 G/DL (ref 6.4–8.4)
RBC # BLD AUTO: 2.44 MILLION/UL (ref 3.81–5.12)
SODIUM SERPL-SCNC: 135 MMOL/L (ref 135–147)
WBC # BLD AUTO: 14.96 THOUSAND/UL (ref 4.31–10.16)

## 2023-11-01 PROCEDURE — 80053 COMPREHEN METABOLIC PANEL: CPT

## 2023-11-01 PROCEDURE — 85027 COMPLETE CBC AUTOMATED: CPT

## 2023-11-01 PROCEDURE — 86334 IMMUNOFIX E-PHORESIS SERUM: CPT | Performed by: STUDENT IN AN ORGANIZED HEALTH CARE EDUCATION/TRAINING PROGRAM

## 2023-11-01 PROCEDURE — 83735 ASSAY OF MAGNESIUM: CPT

## 2023-11-01 PROCEDURE — 83521 IG LIGHT CHAINS FREE EACH: CPT | Performed by: STUDENT IN AN ORGANIZED HEALTH CARE EDUCATION/TRAINING PROGRAM

## 2023-11-01 PROCEDURE — 94640 AIRWAY INHALATION TREATMENT: CPT

## 2023-11-01 PROCEDURE — 84100 ASSAY OF PHOSPHORUS: CPT

## 2023-11-01 PROCEDURE — 94760 N-INVAS EAR/PLS OXIMETRY 1: CPT

## 2023-11-01 PROCEDURE — 94003 VENT MGMT INPAT SUBQ DAY: CPT

## 2023-11-01 PROCEDURE — 82805 BLOOD GASES W/O2 SATURATION: CPT

## 2023-11-01 PROCEDURE — 99232 SBSQ HOSP IP/OBS MODERATE 35: CPT | Performed by: INTERNAL MEDICINE

## 2023-11-01 PROCEDURE — 84165 PROTEIN E-PHORESIS SERUM: CPT | Performed by: STUDENT IN AN ORGANIZED HEALTH CARE EDUCATION/TRAINING PROGRAM

## 2023-11-01 RX ORDER — VANCOMYCIN HYDROCHLORIDE 1 G/200ML
1000 INJECTION, SOLUTION INTRAVENOUS EVERY 24 HOURS
Status: DISCONTINUED | OUTPATIENT
Start: 2023-11-01 | End: 2023-11-03

## 2023-11-01 RX ADMIN — IPRATROPIUM BROMIDE 0.5 MG: 0.5 SOLUTION RESPIRATORY (INHALATION) at 20:50

## 2023-11-01 RX ADMIN — BUSPIRONE HYDROCHLORIDE 30 MG: 10 TABLET ORAL at 18:21

## 2023-11-01 RX ADMIN — VANCOMYCIN HYDROCHLORIDE 1000 MG: 1 INJECTION, SOLUTION INTRAVENOUS at 12:56

## 2023-11-01 RX ADMIN — BUSPIRONE HYDROCHLORIDE 30 MG: 10 TABLET ORAL at 09:50

## 2023-11-01 RX ADMIN — LEVALBUTEROL HYDROCHLORIDE 1.25 MG: 1.25 SOLUTION RESPIRATORY (INHALATION) at 20:49

## 2023-11-01 RX ADMIN — OXYCODONE HYDROCHLORIDE 5 MG: 5 SOLUTION ORAL at 12:58

## 2023-11-01 RX ADMIN — CHLORHEXIDINE GLUCONATE 15 ML: 1.2 SOLUTION ORAL at 09:51

## 2023-11-01 RX ADMIN — ACYCLOVIR 400 MG: 200 CAPSULE ORAL at 18:21

## 2023-11-01 RX ADMIN — BUSPIRONE HYDROCHLORIDE 30 MG: 10 TABLET ORAL at 21:12

## 2023-11-01 RX ADMIN — NICOTINE 7 MG: 7 PATCH, EXTENDED RELEASE TRANSDERMAL at 09:51

## 2023-11-01 RX ADMIN — METOPROLOL TARTRATE 25 MG: 25 TABLET, FILM COATED ORAL at 09:50

## 2023-11-01 RX ADMIN — IPRATROPIUM BROMIDE 0.5 MG: 0.5 SOLUTION RESPIRATORY (INHALATION) at 07:31

## 2023-11-01 RX ADMIN — LEVOFLOXACIN 250 MG: 250 TABLET, FILM COATED ORAL at 09:50

## 2023-11-01 RX ADMIN — FAMOTIDINE 20 MG: 20 TABLET, FILM COATED ORAL at 09:50

## 2023-11-01 RX ADMIN — METOPROLOL TARTRATE 25 MG: 25 TABLET, FILM COATED ORAL at 20:55

## 2023-11-01 RX ADMIN — IPRATROPIUM BROMIDE 0.5 MG: 0.5 SOLUTION RESPIRATORY (INHALATION) at 16:05

## 2023-11-01 RX ADMIN — Medication 8.8 MG: at 22:43

## 2023-11-01 RX ADMIN — CEFEPIME 2000 MG: 2 INJECTION, POWDER, FOR SOLUTION INTRAVENOUS at 23:42

## 2023-11-01 RX ADMIN — QUETIAPINE FUMARATE 50 MG: 25 TABLET ORAL at 21:12

## 2023-11-01 RX ADMIN — ACYCLOVIR 400 MG: 200 CAPSULE ORAL at 09:51

## 2023-11-01 RX ADMIN — LEVALBUTEROL HYDROCHLORIDE 1.25 MG: 1.25 SOLUTION RESPIRATORY (INHALATION) at 03:36

## 2023-11-01 RX ADMIN — FLUCONAZOLE 200 MG: 100 TABLET ORAL at 09:51

## 2023-11-01 RX ADMIN — GABAPENTIN 300 MG: 300 CAPSULE ORAL at 09:50

## 2023-11-01 RX ADMIN — SULFAMETHOXAZOLE AND TRIMETHOPRIM 1 TABLET: 800; 160 TABLET ORAL at 09:51

## 2023-11-01 RX ADMIN — AMLODIPINE BESYLATE 10 MG: 5 TABLET ORAL at 09:51

## 2023-11-01 RX ADMIN — LEVALBUTEROL HYDROCHLORIDE 1.25 MG: 1.25 SOLUTION RESPIRATORY (INHALATION) at 07:31

## 2023-11-01 RX ADMIN — CHLORHEXIDINE GLUCONATE 15 ML: 1.2 SOLUTION ORAL at 20:55

## 2023-11-01 RX ADMIN — OXYCODONE HYDROCHLORIDE 5 MG: 5 SOLUTION ORAL at 20:00

## 2023-11-01 RX ADMIN — FAMOTIDINE 20 MG: 20 TABLET, FILM COATED ORAL at 18:21

## 2023-11-01 RX ADMIN — CEFEPIME 2000 MG: 2 INJECTION, POWDER, FOR SOLUTION INTRAVENOUS at 15:44

## 2023-11-01 RX ADMIN — OXYCODONE HYDROCHLORIDE 5 MG: 5 SOLUTION ORAL at 05:10

## 2023-11-01 RX ADMIN — GABAPENTIN 300 MG: 300 CAPSULE ORAL at 20:55

## 2023-11-01 RX ADMIN — GABAPENTIN 300 MG: 300 CAPSULE ORAL at 18:21

## 2023-11-01 RX ADMIN — OXYCODONE HYDROCHLORIDE 2.5 MG: 5 SOLUTION ORAL at 16:40

## 2023-11-01 RX ADMIN — SERTRALINE 75 MG: 25 TABLET, FILM COATED ORAL at 09:51

## 2023-11-01 RX ADMIN — CEFEPIME 2000 MG: 2 INJECTION, POWDER, FOR SOLUTION INTRAVENOUS at 00:14

## 2023-11-01 RX ADMIN — ONDANSETRON 4 MG: 2 INJECTION INTRAMUSCULAR; INTRAVENOUS at 18:25

## 2023-11-01 RX ADMIN — IPRATROPIUM BROMIDE 0.5 MG: 0.5 SOLUTION RESPIRATORY (INHALATION) at 03:36

## 2023-11-01 RX ADMIN — LEVALBUTEROL HYDROCHLORIDE 1.25 MG: 1.25 SOLUTION RESPIRATORY (INHALATION) at 16:05

## 2023-11-01 RX ADMIN — MELATONIN 3 MG: at 21:12

## 2023-11-01 RX ADMIN — FOLIC ACID 1 MG: 1 TABLET ORAL at 09:50

## 2023-11-01 RX ADMIN — ENOXAPARIN SODIUM 40 MG: 40 INJECTION SUBCUTANEOUS at 09:52

## 2023-11-01 NOTE — CASE MANAGEMENT
Case Management Progress Note    Patient name Raissa Hassan  Location ICU 03/ICU 03 MRN [id number]  : 1955 Date 2023       LOS (days): 49  Geometric Mean LOS (GMLOS) (days): 26.10  Days to GMLOS:-22.8        OBJECTIVE:        Current admission status: Inpatient  Preferred Pharmacy:   [id number] [place] - [address] [facility]. [address] [facility] [facility] [facility] [address]  Phone: [phone] Fax: [phone] [facility]) [place], [place] - [address] [facility] [place]  [address] [facility] [address]  Phone: [phone] Fax: [phone]    Primary Care Provider: Javi Wellington MD    Primary Insurance: [employer]  Secondary Insurance: [employer] NOTE:    CM notified during rounds that plan is for pt to have Chemo on Friday. Pt is on trach collar at 100%. CM updated Geeta - Liaison with Jen Christopher. She did confirm pt would need to be 50% or less of FiO2 prior to admission. Ideally between 40-45%. Geeta aware of Chemo on Friday. Will plan to f/u on Monday. Will need authorization to admit. Pt and daughter aware that Jen Christopher continues to follow for once pt is medically stable and able to meet their O2 requirements.

## 2023-11-01 NOTE — ASSESSMENT & PLAN NOTE
Cuffless trach placed 9/17, transitioned to cuffed on 10/3. Oxygen requirements not improving. For mental health patient is on buspirone, quetiapine, and sertraline. For pain, gabapentin, oxycodone scheduled and prn, prn oxycodone mainly utilized for increased pain during chemo and after a bronch. On Guaifenesin, Atrovent and Xopenex for airway maintenance. No improvement in bilateral consolidation or atelectasis and groundglass opacities on repeat CT and chest x-rays. Patient not receiving proper positive pressure to open up atelectatic lung. Bronchial cx with 3 colonies CoNS. PCP PCR invalid, repeat negative. CTCAP indicates additional groundglass opacity with paraseptal emphysema, which may be contributing to inability to remain off vent. SBTs have been unsuccessful to place patient on trach collar since return to ICU. Patient tolerated high flow all day and overnight. Back on vent overnight, failed trach collar o/n. CXR 10/19 appears unchanged from 10/16 just not good breath during image, less of right lung visualized this time. After 24 hours trial and patient not desaturating or become dyspneic, she was transferred to level 2 step down. She was desatting on HFNC and VBGs showed hypercarbia and hypoxia, due to which she was transferred to critical care. She is being titrated down to 50% FiO2, off of vent since last week. Unable to maintain good sats with lower FiO2. Nurse reported thick yellow/green secretions on suctioning and she was started on antibiotics on 10/29.  10/30 she is on HFNC 58L at 88. Overnight she was on high flow but now at 100% at 12L. VBG is showing stable hypercarbia. Recent Labs     11/01/23  0507   PHVEN 7.358   KNG4OLI 61.4*   PO2VEN 40.1   SNP3HWL 33.8*   BEVEN 7.2       Plan:  Cefepime and Vancomycin started. Day 3. Continue trach collar during the day and at night.   Wean down HFNC to a goal FiO2 of 50%  Maintain cuff overnight from 9 pm to 6 am for positive pressure, can open cuff during the day for patient to speak. Discussed with CM. New jannette for STR, she will be able to come to the hospital for chemotherapy infusions and be admitted for 4 days every 3 weeks.

## 2023-11-01 NOTE — ASSESSMENT & PLAN NOTE
Large B-cell lymphoma, stage II. First chemo cycle 9/22 4 days of R-EPOCH. 9/27 Rituxan. 9/28 Filgrastim. Acyclovir, Zyloprim, Diflucan, Levaquin, Zofran, Bactrim for chemo prophylaxis, all discontinued with heme-onc's approval as Nafisa reached 4200. As per heme/onc attending pancytopenia is expected with her chemotherapy. Restarted all ppx 10/13 for [facility] cycle 2, which was done over 4 days. Ppx and filgrastim can be discontinued when patient is no longer neutropenic after this cycle again. Tunneled line in R IJ in place. She received cytoxan on 10/19. Neutropenic precautions will be needed in a few days    Plan:  Transfuse blood products as needed. Keep Hgb>7 and Plt>20 for chemo   Continue rituximab infusions as per heme/onc plan. Receiving daily filgrastim.   See acute respiratory failure above

## 2023-11-01 NOTE — PHYSICAL THERAPY NOTE
Physical Therapy Cancellation Note           11/01/23 1607   Note Type   Note type Cancelled Session   Cancel Reasons Refusal   Additional Comments Pt with active PT orders. Per OT Rosita, pt currently refusing participation in follow up treatment sessions due to feeling tired. Will continue to follow up with pt. Pt agreeable to treatment tomorrow.      Marval More, PT

## 2023-11-01 NOTE — WOUND OSTOMY CARE
Progress Note - Wound   Shantal Rapp 77 y.o. female MRN: [id number]  Unit/Bed#: ICU 03 Encounter: [id number]      Assessment:   Patient seen today for wound care follow up assessment. Patient is incontinent of bowel and bladder. Patient is max assist with turning from side to side for assessment. Patient is independent with meals. Assessment Findings:   Sacral/buttocks and B/L heels intact and blanching, preventative skin care orders placed. 1. Unstageable pressure injury under trach- area of full thickness skin loss from pressure from trach plate mixed etiology of moisture due to large amount of mucous from trach, wound tissue is 100% yellow slough and periwound skin fragile, small serosanguineous drainge. Wound has improved with this week's assessment. No induration, fluctuance, odor, warmth/temperature differences, redness, or purulence noted to the above noted wounds and skin areas assessed. New dressings applied per orders listed below. Patient tolerated well- no s/s of non-verbal pain or discomfort observed during the encounter. Bedside nurse aware of plan of care. See flow sheets for more detailed assessment findings. Wound care will continue to follow. Skin care plans:  1-Hydraguard to bilateral sacrum, buttock and heels BID and PRN  2-Elevate heels to offload pressure. 3-Ehob cushion in chair when out of bed. 4-Moisturize skin daily with skin nourishing cream.  5-Turn/reposition q2h or when medically stable for pressure re-distribution on skin. 6-Cleanse neck wound with normal saline, apply maxorb to wound, cover with split gauze, change daily and PRN soilage/displacement.     Wound 09/17/23 Neck N/A (Active)   Wound Image   10/04/23 1022   Wound Description Fragile 10/27/23 0400   Tracy-wound Assessment Dry 10/27/23 0400   Wound Length (cm) 1 cm 10/04/23 1022   Wound Width (cm) 1.3 cm 10/04/23 1022   Wound Depth (cm) 0.2 cm 10/04/23 1022   Wound Surface Area (cm^2) 1.3 cm^2 10/04/23 1022   Wound Volume (cm^3) 0.26 cm^3 10/04/23 1022   Calculated Wound Volume (cm^3) 0.26 cm^3 10/04/23 1022   Tunneling 0 cm 10/04/23 1022   Tunneling in depth located at 0 10/04/23 1022   Undermining 0 10/04/23 1022   Undermining is depth extending from 0 10/04/23 1022   Wound Site Closure MICA 10/04/23 1022   Drainage Amount Small 10/04/23 1022   Drainage Description Serosanguineous 10/27/23 0400   Non-staged Wound Description Partial thickness 10/27/23 0400   Treatments Cleansed;Site care 10/26/23 2000   Dressing Gauze 10/26/23 0404   Wound packed? No 10/04/23 1022   Packing- # removed 0 10/04/23 1022   Packing- # inserted 0 10/04/23 1022   Dressing Changed Changed 10/26/23 0404   Patient Tolerance Tolerated well 10/26/23 2000   Dressing Status Clean;Dry; Intact 10/27/23 0400       Wound 09/21/23 Pressure Injury Throat Medial (Active)   Wound Image   11/01/23 1424   Wound Description Slough; Yellow 11/01/23 0937   Pressure Injury Stage U 11/01/23 0937   Tracy-wound Assessment Clean;Dry; Intact 11/01/23 0937   Wound Length (cm) 0.3 cm 11/01/23 0937   Wound Width (cm) 1 cm 11/01/23 0937   Wound Depth (cm) 0.2 cm 11/01/23 0937   Wound Surface Area (cm^2) 0.3 cm^2 11/01/23 0937   Wound Volume (cm^3) 0.06 cm^3 11/01/23 0937   Calculated Wound Volume (cm^3) 0.06 cm^3 11/01/23 0937   Change in Wound Size % 70 11/01/23 0937   Tunneling 0 cm 11/01/23 0937   Tunneling in depth located at 0 11/01/23 0937   Undermining 0 11/01/23 0937   Undermining is depth extending from 0 11/01/23 0937   Wound Site Closure MICA 11/01/23 0937   Drainage Amount Small 11/01/23 0937   Drainage Description Serosanguineous 11/01/23 0937   Non-staged Wound Description Full thickness 11/01/23 0937   Treatments Cleansed 11/01/23 0937   Dressing Calcium Alginate;Gauze 11/01/23 0937   Wound packed?  No 11/01/23 0937   Packing- # removed 0 11/01/23 0937   Packing- # inserted 0 11/01/23 0937   Dressing Changed New 11/01/23 0937   Patient Tolerance Tolerated well 11/01/23 0937   Dressing Status Clean;Dry; Intact 11/01/23 1050 Norman ARGUETAN, RN, [facility]uben [facility] Kailyn

## 2023-11-01 NOTE — ASSESSMENT & PLAN NOTE
Lab Results   Component Value Date    CREATININE 1.18 11/01/2023    CREATININE 1.33 (H) 10/31/2023    CREATININE 1.32 (H) 10/30/2023       Likely prerenal.  Second to poor oral intake versus poor urine output. Baseline creatinine 1 to 1.2.     Cr at baseline  Plan:  Urinary retention protocol, Daily weights, I/O  Trend Cr daily

## 2023-11-01 NOTE — CASE MANAGEMENT
Case Management Progress Note    Patient name Shantal Rapp  Location ICU 03/ICU 03 MRN [id number]  : 1955 Date 2023       LOS (days): 49  Geometric Mean LOS (GMLOS) (days): 26.10  Days to GMLOS:-22.8        OBJECTIVE:        Current admission status: Inpatient  Preferred Pharmacy:   [id number] [place] - [address] [facility]. [address] [facility] [place] [address]  Phone: [phone] Fax: [phone] [facility] [address] [place] [address]  Phone: [phone] Fax: [phone]    Primary Care Provider: Rona Ramires MD    Primary Insurance: [facility]  Secondary Insurance: [facility]    PROGRESS NOTE:    Weekly Care Management Length of Stay Review     Current LOS: 52 Days    Most Recent Labs:     Lab Results   Component Value Date/Time    WBC 14.96 (H) 2023 05:07 AM    HGB 7.5 (L) 2023 05:07 AM    HCT 24.4 (L) 2023 05:07 AM     2023 05:07 AM    BANDSPCT 6 10/30/2023 05:53 AM    SODIUM 135 2023 05:07 AM    K 5.1 2023 05:07 AM     2023 05:07 AM    CO2 30 2023 05:07 AM    BUN 36 (H) 2023 05:07 AM    CREATININE 1.18 2023 05:07 AM    GLUC 128 2023 05:07 AM    ALKPHOS 94 2023 05:07 AM    ALT 23 2023 05:07 AM    AST 13 2023 05:07 AM    ALB 2.9 (L) 2023 05:07 AM    TBILI 0.22 2023 05:07 AM       Most Recent Vitals:   Vitals:    23 1100   BP: 125/63   Pulse: 90   Resp: 20   Temp: 98.6 °F (37 °C)   SpO2: 90%        Identified Barriers to Discharge/Discharge Goals/Care Management Interventions: placed back on high flow, needs to wean high flow    Intended Discharge Disposition: will dc to [facility] and be admitted to hospital for ongoing chemo treatment.      Expected Discharge Date: TBD

## 2023-11-01 NOTE — PROGRESS NOTES
Progress Note - Medical Oncology:  Burke Gonzalez 77 y.o. female MRN: [id number]  Unit/Bed#: ICU 03 Encounter: [id number]    Assessment and Plan:  1. 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 She is anticipating Cycle 3 this Friday, November 3rd. Patient is in agreement. 1. Plan to initiate Cycle 3 of R-EPOCH, while inpatient, on Friday, November 3rd. 2. Acute on Subacute Normocytic Anemia with Intermittent Transfusion Requirement:  Of note, patient has a subacute history of normocytic anemia dating back to September 2023. From September 2015 through August 2023, baseline hemoglobin was within the 13.0-15.6 gm/dL range. Hemoglobin now varies from 7.0-9.3 gm/dL; with a normal mean corpuscular volume, and elevated red cell distribution width. Ancillary-studies, as follows: Iron Panel: Iron: 17 Ferritin: 519 Percent Saturation: 10% TIBC: 165 Vitamin B12: 761 Folate: 5.5. Reticulocyte Index: 0.1. Patient has no evidence of obvious gastrointestinal, or genitourinary bleeding. She is not on oral antiplatelet therapy, or chronic anticoagulation (Note: She is presently on Lovenox 40 mg Daily for VTE-prophylaxis). Highly-suspect acute on subacute anemia requiring blood product transfusion, is secondary to chemotherapy-induced myelosuppression superimposed on a background of anemia of chronic inflammation related to malignancy, with an additional component of folic acid deficiency. Would recommend supplementation with Folic Acid 1 mg Daily. Continue to transfuse at hemoglobin less than 7.0 gm/dL (Leukoreduced, and irradiated blood products only). Monitor counts daily. Patient is in agreement.    1. Supplement with Folic Acid 1 mg Daily. 2. Transfuse at Hemoglobin less than 7.0 gm/dL. A. Note: Please transfuse irradiated, and leukoreduced blood products. Subjective: Interval Events: Patient was seen and examined in the Intensive Care Unit. She was seated upright in bed on my arrival. No acute events noted overnight. Patient was minimally participatory on my evaluation this afternoon. She refused to answer questions, and declined physical examination. Returned to beside with attending physician, Jr Diaz M.D. Patient endorsed improvement from previous. She expressed no questions or concerns at the end of our encounter. Objective:  Vital Signs:  Vitals:    10/31/23 1500   BP: 122/61   Pulse: 95   Resp: 16   Temp:    SpO2: 96%     Physical Exam:   Note: Patient declined physical examination on October 31st.    Lab, Imaging and other studies:     Patient was seen and discussed with attending physician, Jr Diaz M.D.    Mariea Closs, D.O.   Hematology-Oncology Fellow (PGY-4)

## 2023-11-01 NOTE — PROGRESS NOTES
Cosme Crigler is a 77 y.o. female who is currently ordered Vancomycin IV with management by the Pharmacy Consult service. Relevant clinical data and objective / subjective history reviewed. Vancomycin Assessment:  Indication and Goal AUC/Trough: Pneumonia (goal -600, trough >10), -600, trough >10  Clinical Status:  critical care   Micro:     Renal Function:  SCr: 1.18 mg/dL  CrCl: 41.6 mL/min  Renal replacement:  critical care   Days of Therapy: 4  Current Dose: 750 mg IV q24h   Vancomycin Plan:  New Dosin mg IV q24h   Estimated AUC: 496 mcg*hr/mL  Estimated Trough: 14.9 mcg/mL  Next Level:  at 0600  Renal Function Monitoring: Daily BMP and [place] will continue to follow closely for s/sx of nephrotoxicity, infusion reactions and appropriateness of therapy. BMP and CBC will be ordered per protocol. We will continue to follow the patient’s culture results and clinical progress daily.     Colten Strong, Pharmacist

## 2023-11-01 NOTE — PROGRESS NOTES
[facility]  Progress Note  Name: Inna Waggoner  MRN: [id number]  Unit/Bed#: ICU 03 I Date of Admission: 9/13/2023   Date of Service: 11/1/2023 I Hospital Day: 52    Assessment/Plan   * Acute respiratory failure with hypoxia secondary to oropharyngeal mass  Assessment & Plan  Cuffless trach placed 9/17, transitioned to cuffed on 10/3. Oxygen requirements not improving. For mental health patient is on buspirone, quetiapine, and sertraline. For pain, gabapentin, oxycodone scheduled and prn, prn oxycodone mainly utilized for increased pain during chemo and after a bronch. On Guaifenesin, Atrovent and Xopenex for airway maintenance. No improvement in bilateral consolidation or atelectasis and groundglass opacities on repeat CT and chest x-rays. Patient not receiving proper positive pressure to open up atelectatic lung. Bronchial cx with 3 colonies CoNS. PCP PCR invalid, repeat negative. CTCAP indicates additional groundglass opacity with paraseptal emphysema, which may be contributing to inability to remain off vent. SBTs have been unsuccessful to place patient on trach collar since return to ICU. Patient tolerated high flow all day and overnight. Back on vent overnight, failed trach collar o/n. CXR 10/19 appears unchanged from 10/16 just not good breath during image, less of right lung visualized this time. After 24 hours trial and patient not desaturating or become dyspneic, she was transferred to level 2 step down. She was desatting on HFNC and VBGs showed hypercarbia and hypoxia, due to which she was transferred to critical care. She is being titrated down to 50% FiO2, off of vent since last week. Unable to maintain good sats with lower FiO2. Nurse reported thick yellow/green secretions on suctioning and she was started on antibiotics on 10/29.  10/30 she is on HFNC 58L at 88. Overnight she was on high flow but now at 100% at 12L. VBG is showing stable hypercarbia.     Recent Labs 11/01/23  0507   PHVEN 7.358   CLN1RPJ 61.4*   PO2VEN 40.1   LQM3FPF 33.8*   BEVEN 7.2       Plan:  Cefepime and Vancomycin started. Day 3. Continue trach collar during the day and at night. Wean down HFNC to a goal FiO2 of 50%  Maintain cuff overnight from 9 pm to 6 am for positive pressure, can open cuff during the day for patient to speak. Discussed with CM. New jannette for STR, she will be able to come to the hospital for chemotherapy infusions and be admitted for 4 days every 3 weeks. Large cell lymphoma ([address])  Assessment & Plan  Large B-cell lymphoma, stage II. First chemo cycle 9/22 4 days of R-EPOCH. 9/27 Rituxan. 9/28 Filgrastim. Acyclovir, Zyloprim, Diflucan, Levaquin, Zofran, Bactrim for chemo prophylaxis, all discontinued with heme-onc's approval as [facility] reached 4200. As per heme/onc attending pancytopenia is expected with her chemotherapy. Restarted all ppx 10/13 for [facility] cycle 2, which was done over 4 days. Ppx and filgrastim can be discontinued when patient is no longer neutropenic after this cycle again. Tunneled line in R IJ in place. She received cytoxan on 10/19. Neutropenic precautions will be needed in a few days    Plan:  Transfuse blood products as needed. Keep Hgb>7 and Plt>20 for chemo   Continue rituximab infusions as per heme/onc plan. Receiving daily filgrastim. See acute respiratory failure above    Oropharyngeal mass  Assessment & Plan  9/14 Neck/ soft tissue CT: Large enhancing soft tissue mass identified within the left neck involving multiple spaces. Centered within the left oropharynx with extensions as described above into multiple spaces. This results in significant airway compromise. 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 Small level 3/level 4 nodes are seen within the neck. Tracheostomy by ENT. Replaced on 9/26. H/o tobacco abuse, daily smoker. On nicotine while inpatient, confirmed with patient she needs nicotine patch.  CT soft tissue neck shows reduced mass effect from 9/14 to 10/13. Can consider reducing trach size if continues to improve    Plan:  ENT and heme onc following  Will titrate NRT weekly  As per speech therapist recommendations she will continue puree foods. Not ready to advance diet. Continue PEG tube feeds for nutrition. See acute respiratory failure and large B cell lymphoma above      RML pneumonia-resolved as of 9/22/2023  Assessment & Plan  9/13 CT PE study: Patchy consolidation right middle lobe, new from 8/25/2023, compatible with pneumonia. No leukocytosis, no fever/chills. Positive for sore throat, cough. New onset pneumonia after recent hospitalization and antibiotics treatment. 9/14: Bronchoscopy/ ABL. MRSA negative. ABL revealed 2+ Growth of Candida albicans, suspected contaminant. Pancytopenia due to chemotherapy [facility])  Assessment & Plan  Patient received 1 PRBC transfusion on 10/5. Hemoglobin 8.2 s/p 1 PRBC transfusion on 10/25. B/L is 12-14. No sites of bleeding, no black stools. Iron panel indicative of inflammatory anemia. Normal Vitamin B12 levels, negative hemolysis smear. Folate is low 5.5. Plan:  Trend hemoglobin and transfuse for <7. As per heme/onc transfuse irradiated and leukoreduced blood products. Trend platelets  Folic acid 1mg daily supplementation  As per my conversation with Heme/oncology attending, patient is expected to have chemotherapy associated pancytopenia. No further anemia w/u required. PO (acute kidney injury) (HCC)-resolved as of 9/21/2023  Assessment & Plan  Lab Results   Component Value Date    CREATININE 1.18 11/01/2023    CREATININE 1.33 (H) 10/31/2023    CREATININE 1.32 (H) 10/30/2023       Likely prerenal.  Second to poor oral intake versus poor urine output. Baseline creatinine 1 to 1.2.     Cr at baseline  Plan:  Urinary retention protocol, Daily weights, I/O  Trend Cr daily    Blister (nonthermal) of left forearm, sequela  Assessment & Plan  Blisters of left upper extremity healing, no signs of infection, continue daily wound care. Generalized abdominal pain  Assessment & Plan  Patient reports abdominal pain which is unchanged since 9/22 no guarding on exam. Takes Famotidine for GERD at home. Alk phos 10/2 145, 10/20 79. CTCAP reveals complex right upper pole renal lesion requiring possible outpatient MRI    Continue Famotidine  On bowel regimen with Senna and Miralax prn    (HFpEF) heart failure with preserved ejection fraction [facility])  Assessment & Plan  Wt Readings from Last 3 Encounters:   11/01/23 78.3 kg (172 lb 9.9 oz)   09/07/23 74.6 kg (164 lb 6.4 oz)   08/30/23 73.3 kg (161 lb 9.6 oz)     Lab Results   Component Value Date    LVEF 60 08/28/2023     (H) 10/28/2023     (H) 09/29/2023     Echo 8/28/23: LVEF 60%. Plan:  K > 4   Measure I/O      Ambulatory dysfunction  Assessment & Plan  2/2 Impacted by chronic pain syndrome + prolonged hospital stay. Continue OOB with PT. PT rec post acute rehab however reportedly Cannot get LTACH placement while getting chemo per CM  She is aware STR SNFs continue to follow and treatment can be done from a SNF level of care. PT would need to be on trach collar for at least 72 hours with no need for the vent. Aware that pt would need to be around 28% FiO2     Depression  Assessment & Plan  Patient on Zoloft 75 daily and Seroquel hs  Patient is depressed, multiple family/palliative discussions. patient is firm that she wants to live and to fight, she is just tired. Currently goal is disease treatment oriented. Full code. No SI/HI ideations. Palliative signed off, will reconsult if patient changes her mind regarding wishes. On 10/20/23 she decided to leave AMA. Now waiting for CM and machine     Chronic Superficial thrombophlebitis  Assessment & Plan  Right forearm soreness. RUE US: No acute or chronic deep vein thrombosis. 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  PO not severe enough to require renal dosing at this time, will continue to monitor and adjust dosing as needed. Continue DVT ppx with Lovenox 40    Angioedema  Assessment & Plan  POA in [place] ED: Swollen tongue, soft and hard palate with severe angioedema. Decadron 10 mg, Benadryl 25 mg given. 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 Plan:  Cuffless trach 9/17, cuffed Shiley XLT #7 10/3  See acute respiratory failure and oropharyngeal mass above  Continue to wean vent. CKD (chronic kidney disease) stage 3, GFR 30-59 ml/min [facility])  Assessment & Plan  Lab Results   Component Value Date    EGFR 46 11/01/2023    EGFR 40 10/31/2023    EGFR 40 10/30/2023    CREATININE 1.18 11/01/2023    CREATININE 1.33 (H) 10/31/2023    CREATININE 1.32 (H) 10/30/2023     Baseline Cr between 0.75-1.1  Initial PO felt 2/2 prerenal azotemia 2/2 diuresis, reduced PO intake +/- ATN. Patient received 2 doses of Bumex IV 2 g as she had not been responding appropriately to IV 40 Lasix daily. Creatinine went up by 0.5 meeting criteria for an PO. This may be prerenal intrinsic or postrenal.  Potential prerenal causes include reduced kidney perfusion due to lisinopril. Intrinsic can also be lisinopril due to ATN, AIN from chemo medications, TLS, crystaline nephropathy from acyclovir, rituxan nephrotoxicity, filgrastim glomerulonephritis. Post renal obstructive could be from urine retention from vincristine or cyclophosphamide bladder fibrosis. Suspect most likely due to medications as a combination of prerenal and intrinsic. FENa was 0.2%, UA shows no casts or signs of infection, urine eosinophils 0%. Patient likely has prerenal PO from volume depletion. Received 2 L NS and 1 pRBC and cr went up by 0.07 still and Na and Cl went down, suspect that volume prevented further cr rise and free water excretion impaired by kidney function, allowing hyponatremia to increase. Creatinine increase is likely plateaued and went down the following day. Suspect that now that nephrotoxic medications have been stopped she responded well to Lasix and creatinine downtrended. HERIBERTO now resolved. Will be cautious about nephrotoxins and monitor as restarting EPOCH and ppx. Patient gets volume depleted and overloaded very easily. Especially from chemo. 10/19 Heriberto as cr went up by 0.3 in 48 hours from 0.82 on 10/17 to 1.22 on 10/19. Suspect this is prerenal hypovolemic, will see if patient improves with fluids. She gets overloaded easily so if no improvement suspect CRS again. 10/29 HERIBERTO cr went up by 0.3 again. She has 922 ml UOP in last 24 hours. HERIBERTO likely from lasix 40mg IV given yesterday. She is not hypovolemic. Plan:   Continue to monitor UO/renal function. Avoid nephrotoxic agents  Continue to monitor a.m. BMP. Pain syndrome, chronic  Assessment & Plan  Home regimen: Oxycodone 5 mg BID, Tylenol 650 mg q8 prn. Gabapentin 600 mg TID. Medical marijuana. Lumbar radiculopathy and impaired ambulatory dysfunction secondary to pain. Has bowel regimen and is having bowel movements daily. Patient required additional pain management during previous cycle and has some additional pain from tunneled line placement    Plan:  Continue gabapentin 300 mg TID, oxycodone 5 mg TID, prn Tylenol 650 mg q6. Oxycodone 5mg every 8 hours  Oxycodone 2.5 mg q6 PRN    Benign essential hypertension  Assessment & Plan  Home regimen Coreg 12.5 mg BID, Amlodipine 10 mg daily, and lisinopril 40 mg. All discontinued at this time secondary to hypotension and HERIBERTO since cycle 1. but stable currently without any antihypertensives. Systolic persistently elevated and tachycardic, potentially secondary to pain. Patient was more hypotensive during last chemo. Coreg contraindicated during chemo, since cycles are every other week, would be better to stick with lopressor during duration of therapy as opposed to switching constantly. Plan:  Monitor vitals.   Continue Norvasc 10 and lopressor 25 bid  Prn hydralazine for SBP > 160    Hyperlipidemia-resolved as of 10/26/2023  Assessment & Plan  Lab Results   Component Value Date    CHOLESTEROL 118 10/09/2023    CHOLESTEROL 203 (H) 01/24/2023    CHOLESTEROL 177 08/11/2022     Lab Results   Component Value Date    HDL 14 (L) 10/09/2023    HDL 45 (L) 01/24/2023    HDL 37 (L) 08/11/2022     Lab Results   Component Value Date    TRIG 144 10/09/2023    TRIG 166 (H) 09/16/2023    TRIG 160 (H) 01/24/2023     Lab Results   Component Value Date    NONHDLC 104 10/09/2023    [address] 146 07/12/2018    [address] 207 (H) 04/29/2013     Continue outpatient diet and lifestyle modification. COPD without exacerbation (HCC)-resolved as of 9/26/2023  Assessment & Plan  Continue vent with nebs. Wean off vent. Encephalopathy-resolved as of 9/21/2023  Assessment & Plan  9/19- Pt appeared to be AAO x3. Improving. ICU Core Measures     Vented Patient  VAP Bundle  VAP bundle ordered     A: Assess, Prevent, and Manage Pain Has pain been assessed? Yes  Need for changes to pain regimen? No   B: Both Spontaneous Awakening Trials (SATs) and Spontaneous Breathing Trials (SBTs) Plan to perform spontaneous awakening trial today? N/A    C: Choice of Sedation RASS Goal: 0 Alert and Calm or N/A patient not on sedation  Need for changes to sedation or analgesia regimen? NA   D: Delirium CAM-ICU: Negative   E: Early Mobility  Plan for early mobility? Yes   F: Family Engagement Plan for family engagement today? Yes       Antibiotic Review: Continue broad spectrum secondary to severity of illness. Review of Invasive Devices:      Central access plan: Patient has multiple central venous catheters.        Prophylaxis:  VTE VTE covered by:  enoxaparin, Subcutaneous, 40 mg at 11/01/23 8628       Stress Ulcer  covered byfamotidine (PEPCID) tablet 20 mg [[id number]]       Subjective   HPI: 75-year-old female presented to ED on 9/13 with SOB found to have neck mass and airway obstruction showing large cell lymphoma of the oropharynx s/p chemotherapy x2 and tracheostomy. Anticipating initiation of cycle 3 on Friday, 11/3/2023, she was intermittently vent dependent, no longer requiring vent for her persistent hypoxic respiratory failure supplementing her with HFNC but weaning down to 50% FiO2.     24hr events: Patient had become hypoxic overnight and placed on high flow. In the morning she was on 100% at 12 L. She is not wheezing today and she is not in any acute distress. She has multiple bowel movements. Appetite is still reduced. Speech therapy has progressed her diet to dysphagia type 2. Review of Systems   Constitutional:  Negative for chills and fever. HENT:  Negative for ear pain and sore throat. Eyes:  Negative for pain and visual disturbance. Respiratory:  Negative for cough and shortness of breath. Cardiovascular:  Negative for chest pain and palpitations. Gastrointestinal:  Negative for abdominal pain and vomiting. Genitourinary:  Negative for dysuria and hematuria. Musculoskeletal:  Negative for arthralgias and back pain. Skin:  Negative for color change and rash. Neurological:  Negative for seizures and syncope. All other systems reviewed and are negative. Objective                            Vitals I/O      Most Recent Min/Max in 24hrs   Temp 98.6 °F (37 °C) Temp  Min: 98 °F (36.7 °C)  Max: 98.9 °F (37.2 °C)   Pulse 90 Pulse  Min: 78  Max: 109   Resp 20 Resp  Min: 16  Max: 36   /63 BP  Min: 113/58  Max: 151/66   O2 Sat 90 % SpO2  Min: 86 %  Max: 100 %      Intake/Output Summary (Last 24 hours) at 11/1/2023 1232  Last data filed at 11/1/2023 0800  Gross per 24 hour   Intake 2434 ml   Output 1500 ml   Net 934 ml         Diet Enteral/Parenteral; Tube Feeding with Oral Diet; Two Telly HN; Continuous; 38; 190; Water; Every 4 hours; Dysphagia/Modified Consistency;  Dysphagia 2-Mechanical Soft; Honey Thick Liquid; Honey Thick Liquid Invasive Monitoring Physical exam    Physical Exam  Eyes:      Pupils: Pupils are equal, round, and reactive to light. HENT:      Head: Normocephalic and atraumatic. Nose: No congestion. Neck:      Comments: Trach collar  Cardiovascular:      Rate and Rhythm: Normal rate and regular rhythm. Abdominal:      General: There is no distension. Comments: PEG intact for Tube Feedings    Constitutional:       Comments: Sitting in bed upright   Pulmonary:      Effort: Pulmonary effort is normal.      Breath sounds: Normal breath sounds. Neurological:      General: No focal deficit present. Mental Status: She is alert and oriented to person, place and time.           Diagnostic Studies      EKG: NSR  Imaging: No new imaging     Medications:  Scheduled PRN   acyclovir, 400 mg, BID  amLODIPine, 10 mg, Daily  busPIRone, 30 mg, TID  cefepime, 2,000 mg, Q12H  chlorhexidine, 15 mL, Q12H ASHLEY  enoxaparin, 40 mg, Q24H ASHLEY  famotidine, 20 mg, BID  fluconazole, 197 mg, Daily  folic acid, 1 mg, Daily  gabapentin, 300 mg, TID  levalbuterol, 1.25 mg, Q6H   And  ipratropium, 0.5 mg, Q6H  levofloxacin, 250 mg, Q24H ASHLEY  melatonin, 3 mg, HS  metoprolol tartrate, 25 mg, Q12H [facility]  nicotine, 7 mg, Daily  oxyCODONE, 5 mg, Q8H  QUEtiapine, 50 mg, HS  senna, 8.8 mg, HS  sertraline, 75 mg, Daily  sulfamethoxazole-trimethoprim, 1 tablet, Once per day on Mon Wed Fri  vancomycin, 1,000 mg, Q24H      alteplase, 2 mg, Q1MIN PRN  alteplase, 2 mg, Q1MIN PRN  ondansetron, 4 mg, Q8H PRN  oxyCODONE, 2.5 mg, Q6H PRN       Continuous          Labs:    CBC    Recent Labs     10/31/23  0509 11/01/23  0507   WBC 13.87* 14.96*   HGB 7.8* 7.5*   HCT 25.4* 24.4*    173     BMP    Recent Labs     10/31/23  0509 11/01/23  0507   SODIUM 135 135   K 5.0 5.1    100   CO2 31 30   AGAP 4 5   BUN 34* 36*   CREATININE 1.33* 1.18   CALCIUM 9.7 9.6       Coags    No recent results     Additional Electrolytes  Recent Labs     10/31/23  5751 11/01/23  0507   MG 2.3 2.2   PHOS 5.4* 4.5*          Blood Gas    No recent results  Recent Labs     11/01/23  0507   PHVEN 7.358   HDM6TRG 61.4*   PO2VEN 40.1   LCT2QJM 33.8*   BEVEN 7.2    LFTs  Recent Labs     10/31/23  0509 11/01/23  0507   ALT 28 23   AST 16 13   ALKPHOS 99 94   ALB 2.9* 2.9*   TBILI 0.25 0.22       Infectious  No recent results  Glucose  Recent Labs     10/31/23  0509 11/01/23  0507   GLUC 121 128               Jose Alfredo Sosa MD

## 2023-11-01 NOTE — PLAN OF CARE
Continuity of Care Form    Patient Name: Vishal Mckeon   :  1955  MRN:  [id number]    Admit date:  11/15/2022  Discharge date:  22    Code Status Order: Full Code   Advance Directives:     Admitting Physician:  No admitting provider for patient encounter. PCP: ARTURO Giron CNP    Discharging Nurse:   [address] Unit/Room#:   Discharging Unit Phone Number: [phone]    Emergency Contact:   Extended Emergency Contact Information  Primary Emergency Contact: [address] Phone: [phone]  Relation: Spouse  Secondary Emergency Contact: Tatiana Chester [address] Phone: [phone]  Mobile Phone: [phone]  Relation: Step Child    Past Surgical History:  Past Surgical History:   Procedure Laterality Date    HYSTERECTOMY (CERVIX STATUS UNKNOWN)      TONSILLECTOMY         Immunization History: There is no immunization history on file for this patient. Active Problems:  Patient Active Problem List   Diagnosis Code    Type 2 diabetes mellitus with hyperglycemia, without long-term current use of insulin ([facility]) E11.65    GERD (gastroesophageal reflux disease) K21.9    MVP (mitral valve prolapse) I34.1    DVT (deep venous thrombosis) ([facility]) I82.409    Essential hypertension I10    Radiculopathy of lumbosacral region M54.17    Staghorn renal calculus N20.0    Foraminal stenosis of lumbar region M48.061    SIRS (systemic inflammatory response syndrome) ([facility]) R65.10    Lymphedema of both lower extremities I89.0    Cellulitis of left lower extremity L03. 116    Morbid obesity with BMI of 50.0-59.9, adult ([facility]) E66.01, Z68.43    Chronic hypoxemic respiratory failure ([facility]) J96.11    Pulmonary hypertension ([facility]) I27.20    Ulcer of right pretibial region, limited to breakdown of skin ([facility] [address] L97.811    Diabetic polyneuropathy ([facility]) E11.42    Abscess L02.91    Onychomycosis B35.1    Dyslipidemia E78.5    Renal dysfunction N28.9    Hypomagnesemia Problem: MOBILITY - ADULT  Goal: Maintain or return to baseline ADL function  Description: INTERVENTIONS:  -  Assess patient's ability to carry out ADLs; assess patient's baseline for ADL function and identify physical deficits which impact ability to perform ADLs (bathing, care of mouth/teeth, toileting, grooming, dressing, etc.)  - Assess/evaluate cause of self-care deficits   - Assess range of motion  - Assess patient's mobility; develop plan if impaired  - Assess patient's need for assistive devices and provide as appropriate  - Encourage maximum independence but intervene and supervise when necessary  - Involve family in performance of ADLs  - Assess for home care needs following discharge   - Consider OT consult to assist with ADL evaluation and planning for discharge  - Provide patient education as appropriate  Outcome: Progressing  Goal: Maintains/Returns to pre admission functional level  Description: INTERVENTIONS:  - Perform BMAT or MOVE assessment daily.   - Set and communicate daily mobility goal to care team and patient/family/caregiver.    - Collaborate with rehabilitation services on mobility goals if consulted  - Out of bed for toileting  - Record patient progress and toleration of activity level   Outcome: Progressing     Problem: Prexisting or High Potential for Compromised Skin Integrity  Goal: Skin integrity is maintained or improved  Description: INTERVENTIONS:  - Identify patients at risk for skin breakdown  - Assess and monitor skin integrity  - Assess and monitor nutrition and hydration status  - Monitor labs   - Assess for incontinence   - Turn and reposition patient  - Assist with mobility/ambulation  - Relieve pressure over bony prominences  - Avoid friction and shearing  - Provide appropriate hygiene as needed including keeping skin clean and dry  - Evaluate need for skin moisturizer/barrier cream  - Collaborate with interdisciplinary team   - Patient/family teaching  - Consider wound E83.42    Paroxysmal atrial fibrillation (HCC) I48.0    Breast screening declined Z53.20    Cellulitis L03.90    Venous insufficiency of both lower extremities I87.2    Plantar fasciitis of right foot M72.2    Bacteremia due to group B Streptococcus R78.81, B95.1       Isolation/Infection:   Isolation            Contact          Patient Infection Status       Infection Onset Added Last Indicated Last Indicated By Review Planned Expiration Resolved Resolved By    MDRO (multi-drug resistant organism)  07/07/17 07/07/17 Jenny Lange RN        Klebsiella - Urine 7/2017      MRSA  04/27/16 09/04/21 Wound Culture        Foot - 4/2016            Nurse Assessment:  Last Vital Signs: BP (!) 168/61   Pulse 79   Temp 98.1 °F (36.7 °C) (Oral)   Resp 16   Ht 6' (1.829 m)   Wt (!) 385 lb (174.6 kg)   SpO2 94%   BMI 52.22 kg/m²     Last documented pain score (0-10 scale): Pain Level: 5  Last Weight:   Wt Readings from Last 1 Encounters:   11/20/22 (!) 385 lb (174.6 kg)     Mental Status:  oriented    IV Access:  PICC; left basilic     Nursing Mobility/ADLs:  Walking   Assisted  Transfer  Assisted  Bathing  Assisted  Dressing  Assisted  Toileting  Assisted  Feeding  [address]  Independent  Med Delivery   whole    Wound Care Documentation and Therapy:        Elimination:  Continence: Bowel: Yes  Bladder: Yes  Urinary Catheter: None   Colostomy/Ileostomy/Ileal Conduit: No       Date of Last BM:     Intake/Output Summary (Last 24 hours) at 11/20/2022 1449  Last data filed at 11/20/2022 0707  Gross per 24 hour   Intake 776.07 ml   Output --   Net 776.07 ml     I/O last 3 completed shifts: In: 12 [P.O.:960]  Out: 800 [Urine:800]    Safety Concerns: At Risk for Falls    Impairments/Disabilities:      None    Nutrition Therapy:  Current Nutrition Therapy:   - Oral Diet:  General and Carb Control 3 carbs/meal (1500kcals/day)    Routes of Feeding: Oral  Liquids:  Thin Liquids  Daily Fluid Restriction: no  Last care consult   Outcome: Progressing     Problem: Nutrition/Hydration-ADULT  Goal: Nutrient/Hydration intake appropriate for improving, restoring or maintaining nutritional needs  Description: Monitor and assess patient's nutrition/hydration status for malnutrition. Collaborate with interdisciplinary team and initiate plan and interventions as ordered. Monitor patient's weight and dietary intake as ordered or per policy. Utilize nutrition screening tool and intervene as necessary. Determine patient's food preferences and provide high-protein, high-caloric foods as appropriate.      INTERVENTIONS:  - Monitor oral intake, urinary output, labs, and treatment plans  - Assess nutrition and hydration status and recommend course of action  - Evaluate amount of meals eaten  - Assist patient with eating if necessary   - Allow adequate time for meals  - Recommend/ encourage appropriate diets, oral nutritional supplements, and vitamin/mineral supplements  - Order, calculate, and assess calorie counts as needed  - Recommend, monitor, and adjust tube feedings and TPN/PPN based on assessed needs  - Assess need for intravenous fluids  - Provide specific nutrition/hydration education as appropriate  - Include patient/family/caregiver in decisions related to nutrition  Outcome: Progressing     Problem: PAIN - ADULT  Goal: Verbalizes/displays adequate comfort level or baseline comfort level  Description: Interventions:  - Encourage patient to monitor pain and request assistance  - Assess pain using appropriate pain scale  - Administer analgesics based on type and severity of pain and evaluate response  - Implement non-pharmacological measures as appropriate and evaluate response  - Consider cultural and social influences on pain and pain management  - Notify physician/advanced practitioner if interventions unsuccessful or patient reports new pain  Outcome: Progressing     Problem: INFECTION - ADULT  Goal: Absence or prevention of Modified Barium Swallow with Video (Video Swallowing Test): not done    Treatments at the Time of Hospital Discharge:   Respiratory Treatments: na   Oxygen Therapy:  is not on home oxygen therapy. Ventilator:    - No ventilator support    Rehab Therapies:  Physical and Occupational Therapy  Weight Bearing Status/Restrictions: No weight bearing restrictions  Other Medical Equipment (for information only, NOT a DME order):  walker  Other Treatments:   Skilled RN assessment   Med teaching and compliance  IV Rocephin daily for 7 days  PICC line protocol with dressing changes and flushes. Patient's personal belongings (please select all that are sent with patient):  Glasses    RN SIGNATURE:  Electronically signed by Loretta Castro RN on 11/21/22 at 12:06 PM EST    CASE MANAGEMENT/SOCIAL WORK SECTION    Inpatient Status Date: 11/15    Readmission Risk Assessment Score:  Readmission Risk              Risk of Unplanned Readmission:  19           Discharging to Facility/ Agency   Name: [facility]  Phone: [phone]  Fax: [phone]      / signature: Electronically signed by Reid Jacques RN on 11/20/22 at 3:03 PM EST    PHYSICIAN SECTION    Prognosis: Good    Condition at Discharge: Stable    Rehab Potential (if transferring to Rehab): Good    Recommended Labs or Other Treatments After Discharge:   Standard flushing per protocol   Dr Guy to follow for any questions regarding atb.  [phone]  No labs needed   NS flushes 10ml before and after IV Rocephin    Physician Certification: I certify the above information and transfer of Victor M Lackey  is necessary for the continuing treatment of the diagnosis listed and that she requires Home Care for less 30 days.      Update Admission H&P: No change in H&P    PHYSICIAN SIGNATURE:  Electronically signed by Tl Guy DO on 11/21/22 at 12:52 PM EST progression during hospitalization  Description: INTERVENTIONS:  - Assess and monitor for signs and symptoms of infection  - Monitor lab/diagnostic results  - Monitor all insertion sites, i.e. indwelling lines, tubes, and drains  - Monitor endotracheal if appropriate and nasal secretions for changes in amount and color  - [place] appropriate cooling/warming therapies per order  - Administer medications as ordered  - Instruct and encourage patient and family to use good hand hygiene technique  - Identify and instruct in appropriate isolation precautions for identified infection/condition  Outcome: Progressing  Goal: Absence of fever/infection during neutropenic period  Description: INTERVENTIONS:  - Monitor WBC    Outcome: Progressing     Problem: SAFETY ADULT  Goal: Maintain or return to baseline ADL function  Description: INTERVENTIONS:  -  Assess patient's ability to carry out ADLs; assess patient's baseline for ADL function and identify physical deficits which impact ability to perform ADLs (bathing, care of mouth/teeth, toileting, grooming, dressing, etc.)  - Assess/evaluate cause of self-care deficits   - Assess range of motion  - Assess patient's mobility; develop plan if impaired  - Assess patient's need for assistive devices and provide as appropriate  - Encourage maximum independence but intervene and supervise when necessary  - Involve family in performance of ADLs  - Assess for home care needs following discharge   - Consider OT consult to assist with ADL evaluation and planning for discharge  - Provide patient education as appropriate  Outcome: Progressing  Goal: Maintains/Returns to pre admission functional level  Description: INTERVENTIONS:  - Perform BMAT or MOVE assessment daily.   - Set and communicate daily mobility goal to care team and patient/family/caregiver.    - Collaborate with rehabilitation services on mobility goals if consulted  - Out of bed for toileting  - Record patient progress and toleration of activity level   Outcome: Progressing  Goal: Patient will remain free of falls  Description: INTERVENTIONS:  - Educate patient/family on patient safety including physical limitations  - Instruct patient to call for assistance with activity   - Consult OT/PT to assist with strengthening/mobility   - Keep Call bell within reach  - Keep bed low and locked with side rails adjusted as appropriate  - Keep care items and personal belongings within reach  - Initiate and maintain comfort rounds  - Make Fall Risk Sign visible to staff  - Apply yellow socks and bracelet for high fall risk patients  - Consider moving patient to room near nurses station  Outcome: Progressing     Problem: DISCHARGE PLANNING  Goal: Discharge to home or other facility with appropriate resources  Description: INTERVENTIONS:  - Identify barriers to discharge w/patient and caregiver  - Arrange for needed discharge resources and transportation as appropriate  - Identify discharge learning needs (meds, wound care, etc.)  - Arrange for interpretive services to assist at discharge as needed  - Refer to Case Management Department for coordinating discharge planning if the patient needs post-hospital services based on physician/advanced practitioner order or complex needs related to functional status, cognitive ability, or social support system  Outcome: Progressing     Problem: Knowledge Deficit  Goal: Patient/family/caregiver demonstrates understanding of disease process, treatment plan, medications, and discharge instructions  Description: Complete learning assessment and assess knowledge base.   Interventions:  - Provide teaching at level of understanding  - Provide teaching via preferred learning methods  Outcome: Progressing     Problem: RESPIRATORY - ADULT  Goal: Achieves optimal ventilation and oxygenation  Description: INTERVENTIONS:  - Assess for changes in respiratory status  - Assess for changes in mentation and behavior  - Position to facilitate oxygenation and minimize respiratory effort  - Oxygen administered by appropriate delivery if ordered  - Initiate smoking cessation education as indicated  - Encourage broncho-pulmonary hygiene including cough, deep breathe, Incentive Spirometry  - Assess the need for suctioning and aspirate as needed  - Assess and instruct to report SOB or any respiratory difficulty  - Respiratory Therapy support as indicated  Outcome: Progressing     Problem: GENITOURINARY - ADULT  Goal: Maintains or returns to baseline urinary function  Description: INTERVENTIONS:  - Assess urinary function  - Encourage oral fluids to ensure adequate hydration if ordered  - Administer IV fluids as ordered to ensure adequate hydration  - Administer ordered medications as needed  - Offer frequent toileting  - Follow urinary retention protocol if ordered  Outcome: Progressing  Goal: Absence of urinary retention  Description: INTERVENTIONS:  - Assess patient’s ability to void and empty bladder  - Monitor I/O  - Bladder scan as needed  - Discuss with physician/AP medications to alleviate retention as needed  - Discuss catheterization for long term situations as appropriate  Outcome: Progressing     Problem: METABOLIC, FLUID AND ELECTROLYTES - ADULT  Goal: Electrolytes maintained within normal limits  Description: INTERVENTIONS:  - Monitor labs and assess patient for signs and symptoms of electrolyte imbalances  - Administer electrolyte replacement as ordered  - Monitor response to electrolyte replacements, including repeat lab results as appropriate  - Instruct patient on fluid and nutrition as appropriate  Outcome: Progressing  Goal: Fluid balance maintained  Description: INTERVENTIONS:  - Monitor labs   - Monitor I/O and WT  - Instruct patient on fluid and nutrition as appropriate  - Assess for signs & symptoms of volume excess or deficit  Outcome: Progressing  Goal: Glucose maintained within target range  Description: INTERVENTIONS:  - Monitor Blood Glucose as ordered  - Assess for signs and symptoms of hyperglycemia and hypoglycemia  - Administer ordered medications to maintain glucose within target range  - Assess nutritional intake and initiate nutrition service referral as needed  Outcome: Progressing

## 2023-11-01 NOTE — ASSESSMENT & PLAN NOTE
POA in [place] ([place]) ED: Swollen tongue, soft and hard palate with severe angioedema. Decadron 10 mg, Benadryl 25 mg given. 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 Plan:  Cuffless trach 9/17, cuffed Shiley XLT #7 10/3  See acute respiratory failure and oropharyngeal mass above  Continue to wean vent.

## 2023-11-01 NOTE — ASSESSMENT & PLAN NOTE
Lab Results   Component Value Date    CHOLESTEROL 118 10/09/2023    CHOLESTEROL 203 (H) 01/24/2023    CHOLESTEROL 177 08/11/2022     Lab Results   Component Value Date    HDL 14 (L) 10/09/2023    HDL 45 (L) 01/24/2023    HDL 37 (L) 08/11/2022     Lab Results   Component Value Date    TRIG 144 10/09/2023    TRIG 166 (H) 09/16/2023    TRIG 160 (H) 01/24/2023     Lab Results   Component Value Date    NONHDLC 104 10/09/2023    [address] 146 07/12/2018    [address] 207 (H) 04/29/2013     Continue outpatient diet and lifestyle modification.

## 2023-11-01 NOTE — ASSESSMENT & PLAN NOTE
Wt Readings from Last 3 Encounters:   11/01/23 78.3 kg (172 lb 9.9 oz)   09/07/23 74.6 kg (164 lb 6.4 oz)   08/30/23 73.3 kg (161 lb 9.6 oz)     Lab Results   Component Value Date    LVEF 60 08/28/2023     (H) 10/28/2023     (H) 09/29/2023     Echo 8/28/23: LVEF 60%.        Plan:  K > 4   Measure I/O

## 2023-11-01 NOTE — ASSESSMENT & PLAN NOTE
Lab Results   Component Value Date    EGFR 46 11/01/2023    EGFR 40 10/31/2023    EGFR 40 10/30/2023    CREATININE 1.18 11/01/2023    CREATININE 1.33 (H) 10/31/2023    CREATININE 1.32 (H) 10/30/2023     Baseline Cr between 0.75-1.1  Initial HERIBERTO felt 2/2 prerenal azotemia 2/2 diuresis, reduced PO intake +/- ATN. Patient received 2 doses of Bumex IV 2 g as she had not been responding appropriately to IV 40 Lasix daily. Creatinine went up by 0.5 meeting criteria for an HERIBERTO. This may be prerenal intrinsic or postrenal.  Potential prerenal causes include reduced kidney perfusion due to lisinopril. Intrinsic can also be lisinopril due to ATN, AIN from chemo medications, TLS, crystaline nephropathy from acyclovir, rituxan nephrotoxicity, filgrastim glomerulonephritis. Post renal obstructive could be from urine retention from vincristine or cyclophosphamide bladder fibrosis. Suspect most likely due to medications as a combination of prerenal and intrinsic. FENa was 0.2%, UA shows no casts or signs of infection, urine eosinophils 0%. Patient likely has prerenal HERIBERTO from volume depletion. Received 2 L NS and 1 pRBC and cr went up by 0.07 still and Na and Cl went down, suspect that volume prevented further cr rise and free water excretion impaired by kidney function, allowing hyponatremia to increase. Creatinine increase is likely plateaued and went down the following day. Suspect that now that nephrotoxic medications have been stopped she responded well to Lasix and creatinine downtrended. HERIBERTO now resolved. Will be cautious about nephrotoxins and monitor as restarting EPOCH and ppx. Patient gets volume depleted and overloaded very easily. Especially from chemo. 10/19 Heriberto as cr went up by 0.3 in 48 hours from 0.82 on 10/17 to 1.22 on 10/19. Suspect this is prerenal hypovolemic, will see if patient improves with fluids. She gets overloaded easily so if no improvement suspect CRS again.      10/29 HERIBERTO cr went up by 0.3 again. She has 922 ml UOP in last 24 hours. PO likely from lasix 40mg IV given yesterday. She is not hypovolemic. Plan:   Continue to monitor UO/renal function. Avoid nephrotoxic agents  Continue to monitor a.m. BMP.

## 2023-11-01 NOTE — PLAN OF CARE
Problem: MOBILITY - ADULT  Goal: Maintain or return to baseline ADL function  Description: INTERVENTIONS:  -  Assess patient's ability to carry out ADLs; assess patient's baseline for ADL function and identify physical deficits which impact ability to perform ADLs (bathing, care of mouth/teeth, toileting, grooming, dressing, etc.)  - Assess/evaluate cause of self-care deficits   - Assess range of motion  - Assess patient's mobility; develop plan if impaired  - Assess patient's need for assistive devices and provide as appropriate  - Encourage maximum independence but intervene and supervise when necessary  - Involve family in performance of ADLs  - Assess for home care needs following discharge   - Consider OT consult to assist with ADL evaluation and planning for discharge  - Provide patient education as appropriate  Outcome: Progressing  Goal: Maintains/Returns to pre admission functional level  Description: INTERVENTIONS:  - Perform BMAT or MOVE assessment daily.   - Set and communicate daily mobility goal to care team and patient/family/caregiver. - Collaborate with rehabilitation services on mobility goals if consulted  - Perform Range of Motion  times a day. - Reposition patient every 2 hours.   - Dangle patient  times a day  - Stand patient  times a day  - Ambulate patient  times a day  - Out of bed to chair  times a day   - Out of bed for meals  times a day  - Out of bed for toileting  - Record patient progress and toleration of activity level   Outcome: Progressing     Problem: Prexisting or High Potential for Compromised Skin Integrity  Goal: Skin integrity is maintained or improved  Description: INTERVENTIONS:  - Identify patients at risk for skin breakdown  - Assess and monitor skin integrity  - Assess and monitor nutrition and hydration status  - Monitor labs   - Assess for incontinence   - Turn and reposition patient  - Assist with mobility/ambulation  - Relieve pressure over bony prominences  - Avoid friction and shearing  - Provide appropriate hygiene as needed including keeping skin clean and dry  - Evaluate need for skin moisturizer/barrier cream  - Collaborate with interdisciplinary team   - Patient/family teaching  - Consider wound care consult   Outcome: Progressing     Problem: Nutrition/Hydration-ADULT  Goal: Nutrient/Hydration intake appropriate for improving, restoring or maintaining nutritional needs  Description: Monitor and assess patient's nutrition/hydration status for malnutrition. Collaborate with interdisciplinary team and initiate plan and interventions as ordered. Monitor patient's weight and dietary intake as ordered or per policy. Utilize nutrition screening tool and intervene as necessary. Determine patient's food preferences and provide high-protein, high-caloric foods as appropriate.      INTERVENTIONS:  - Monitor oral intake, urinary output, labs, and treatment plans  - Assess nutrition and hydration status and recommend course of action  - Evaluate amount of meals eaten  - Assist patient with eating if necessary   - Allow adequate time for meals  - Recommend/ encourage appropriate diets, oral nutritional supplements, and vitamin/mineral supplements  - Order, calculate, and assess calorie counts as needed  - Recommend, monitor, and adjust tube feedings and TPN/PPN based on assessed needs  - Assess need for intravenous fluids  - Provide specific nutrition/hydration education as appropriate  - Include patient/family/caregiver in decisions related to nutrition  Outcome: Progressing     Problem: PAIN - ADULT  Goal: Verbalizes/displays adequate comfort level or baseline comfort level  Description: Interventions:  - Encourage patient to monitor pain and request assistance  - Assess pain using appropriate pain scale  - Administer analgesics based on type and severity of pain and evaluate response  - Implement non-pharmacological measures as appropriate and evaluate response  - Consider cultural and social influences on pain and pain management  - Notify physician/advanced practitioner if interventions unsuccessful or patient reports new pain  Outcome: Progressing     Problem: INFECTION - ADULT  Goal: Absence or prevention of progression during hospitalization  Description: INTERVENTIONS:  - Assess and monitor for signs and symptoms of infection  - Monitor lab/diagnostic results  - Monitor all insertion sites, i.e. indwelling lines, tubes, and drains  - Monitor endotracheal if appropriate and nasal secretions for changes in amount and color  - [place] appropriate cooling/warming therapies per order  - Administer medications as ordered  - Instruct and encourage patient and family to use good hand hygiene technique  - Identify and instruct in appropriate isolation precautions for identified infection/condition  Outcome: Progressing  Goal: Absence of fever/infection during neutropenic period  Description: INTERVENTIONS:  - Monitor WBC    Outcome: Progressing     Problem: SAFETY ADULT  Goal: Maintain or return to baseline ADL function  Description: INTERVENTIONS:  -  Assess patient's ability to carry out ADLs; assess patient's baseline for ADL function and identify physical deficits which impact ability to perform ADLs (bathing, care of mouth/teeth, toileting, grooming, dressing, etc.)  - Assess/evaluate cause of self-care deficits   - Assess range of motion  - Assess patient's mobility; develop plan if impaired  - Assess patient's need for assistive devices and provide as appropriate  - Encourage maximum independence but intervene and supervise when necessary  - Involve family in performance of ADLs  - Assess for home care needs following discharge   - Consider OT consult to assist with ADL evaluation and planning for discharge  - Provide patient education as appropriate  Outcome: Progressing  Goal: Maintains/Returns to pre admission functional level  Description: INTERVENTIONS:  - Perform BMAT or MOVE assessment daily.   - Set and communicate daily mobility goal to care team and patient/family/caregiver. - Collaborate with rehabilitation services on mobility goals if consulted  - Perform Range of Motion  times a day. - Reposition patient every 2 hours.   - Dangle patient  times a day  - Stand patient  times a day  - Ambulate patient  times a day  - Out of bed to chair  times a day   - Out of bed for meals  times a day  - Out of bed for toileting  - Record patient progress and toleration of activity level   Outcome: Progressing  Goal: Patient will remain free of falls  Description: INTERVENTIONS:  - Educate patient/family on patient safety including physical limitations  - Instruct patient to call for assistance with activity   - Consult OT/PT to assist with strengthening/mobility   - Keep Call bell within reach  - Keep bed low and locked with side rails adjusted as appropriate  - Keep care items and personal belongings within reach  - Initiate and maintain comfort rounds  - Make Fall Risk Sign visible to staff  - Offer Toileting every 2 Hours, in advance of need  - Initiate/Maintain bed alarm  - Obtain necessary fall risk management equipment:   - Apply yellow socks and bracelet for high fall risk patients  - Consider moving patient to room near nurses station  Outcome: Progressing     Problem: DISCHARGE PLANNING  Goal: Discharge to home or other facility with appropriate resources  Description: INTERVENTIONS:  - Identify barriers to discharge w/patient and caregiver  - Arrange for needed discharge resources and transportation as appropriate  - Identify discharge learning needs (meds, wound care, etc.)  - Arrange for interpretive services to assist at discharge as needed  - Refer to Case Management Department for coordinating discharge planning if the patient needs post-hospital services based on physician/advanced practitioner order or complex needs related to functional status, cognitive ability, or social support system  Outcome: Progressing     Problem: Knowledge Deficit  Goal: Patient/family/caregiver demonstrates understanding of disease process, treatment plan, medications, and discharge instructions  Description: Complete learning assessment and assess knowledge base.   Interventions:  - Provide teaching at level of understanding  - Provide teaching via preferred learning methods  Outcome: Progressing     Problem: RESPIRATORY - ADULT  Goal: Achieves optimal ventilation and oxygenation  Description: INTERVENTIONS:  - Assess for changes in respiratory status  - Assess for changes in mentation and behavior  - Position to facilitate oxygenation and minimize respiratory effort  - Oxygen administered by appropriate delivery if ordered  - Initiate smoking cessation education as indicated  - Encourage broncho-pulmonary hygiene including cough, deep breathe, Incentive Spirometry  - Assess the need for suctioning and aspirate as needed  - Assess and instruct to report SOB or any respiratory difficulty  - Respiratory Therapy support as indicated  Outcome: Progressing     Problem: GENITOURINARY - ADULT  Goal: Maintains or returns to baseline urinary function  Description: INTERVENTIONS:  - Assess urinary function  - Encourage oral fluids to ensure adequate hydration if ordered  - Administer IV fluids as ordered to ensure adequate hydration  - Administer ordered medications as needed  - Offer frequent toileting  - Follow urinary retention protocol if ordered  Outcome: Progressing  Goal: Absence of urinary retention  Description: INTERVENTIONS:  - Assess patient’s ability to void and empty bladder  - Monitor I/O  - Bladder scan as needed  - Discuss with physician/AP medications to alleviate retention as needed  - Discuss catheterization for long term situations as appropriate  Outcome: Progressing     Problem: METABOLIC, FLUID AND ELECTROLYTES - ADULT  Goal: Electrolytes maintained within normal limits  Description: INTERVENTIONS:  - Monitor labs and assess patient for signs and symptoms of electrolyte imbalances  - Administer electrolyte replacement as ordered  - Monitor response to electrolyte replacements, including repeat lab results as appropriate  - Instruct patient on fluid and nutrition as appropriate  Outcome: Progressing  Goal: Fluid balance maintained  Description: INTERVENTIONS:  - Monitor labs   - Monitor I/O and WT  - Instruct patient on fluid and nutrition as appropriate  - Assess for signs & symptoms of volume excess or deficit  Outcome: Progressing  Goal: Glucose maintained within target range  Description: INTERVENTIONS:  - Monitor Blood Glucose as ordered  - Assess for signs and symptoms of hyperglycemia and hypoglycemia  - Administer ordered medications to maintain glucose within target range  - Assess nutritional intake and initiate nutrition service referral as needed  Outcome: Progressing

## 2023-11-02 ENCOUNTER — APPOINTMENT (INPATIENT)
Dept: RADIOLOGY | Facility: HOSPITAL | Age: 68
DRG: 004 | End: 2023-11-02
Payer: COMMERCIAL

## 2023-11-02 LAB
ANION GAP SERPL CALCULATED.3IONS-SCNC: 3 MMOL/L
BUN SERPL-MCNC: 36 MG/DL (ref 5–25)
CALCIUM SERPL-MCNC: 9.8 MG/DL (ref 8.4–10.2)
CHLORIDE SERPL-SCNC: 101 MMOL/L (ref 96–108)
CO2 SERPL-SCNC: 32 MMOL/L (ref 21–32)
CREAT SERPL-MCNC: 1.27 MG/DL (ref 0.6–1.3)
ERYTHROCYTE [DISTWIDTH] IN BLOOD BY AUTOMATED COUNT: 16.4 % (ref 11.6–15.1)
GFR SERPL CREATININE-BSD FRML MDRD: 42 ML/MIN/1.73SQ M
GLUCOSE SERPL-MCNC: 92 MG/DL (ref 65–140)
HCT VFR BLD AUTO: 24 % (ref 34.8–46.1)
HGB BLD-MCNC: 7.2 G/DL (ref 11.5–15.4)
KAPPA LC FREE SER-MCNC: 46.5 MG/L (ref 3.3–19.4)
KAPPA LC FREE/LAMBDA FREE SER: 1.64 {RATIO} (ref 0.26–1.65)
LAMBDA LC FREE SERPL-MCNC: 28.3 MG/L (ref 5.7–26.3)
MCH RBC QN AUTO: 30 PG (ref 26.8–34.3)
MCHC RBC AUTO-ENTMCNC: 30 G/DL (ref 31.4–37.4)
MCV RBC AUTO: 100 FL (ref 82–98)
PLATELET # BLD AUTO: 183 THOUSANDS/UL (ref 149–390)
PMV BLD AUTO: 9.5 FL (ref 8.9–12.7)
POTASSIUM SERPL-SCNC: 5.3 MMOL/L (ref 3.5–5.3)
RBC # BLD AUTO: 2.4 MILLION/UL (ref 3.81–5.12)
SODIUM SERPL-SCNC: 136 MMOL/L (ref 135–147)
WBC # BLD AUTO: 13.13 THOUSAND/UL (ref 4.31–10.16)

## 2023-11-02 PROCEDURE — 80048 BASIC METABOLIC PNL TOTAL CA: CPT

## 2023-11-02 PROCEDURE — 94640 AIRWAY INHALATION TREATMENT: CPT

## 2023-11-02 PROCEDURE — 97168 OT RE-EVAL EST PLAN CARE: CPT

## 2023-11-02 PROCEDURE — 94760 N-INVAS EAR/PLS OXIMETRY 1: CPT

## 2023-11-02 PROCEDURE — 97164 PT RE-EVAL EST PLAN CARE: CPT

## 2023-11-02 PROCEDURE — 71045 X-RAY EXAM CHEST 1 VIEW: CPT

## 2023-11-02 PROCEDURE — 85027 COMPLETE CBC AUTOMATED: CPT

## 2023-11-02 PROCEDURE — 97116 GAIT TRAINING THERAPY: CPT

## 2023-11-02 PROCEDURE — 94002 VENT MGMT INPAT INIT DAY: CPT

## 2023-11-02 PROCEDURE — 99232 SBSQ HOSP IP/OBS MODERATE 35: CPT | Performed by: INTERNAL MEDICINE

## 2023-11-02 RX ORDER — FLUCONAZOLE 100 MG/1
400 TABLET ORAL DAILY
Status: DISCONTINUED | OUTPATIENT
Start: 2023-11-03 | End: 2023-11-20

## 2023-11-02 RX ORDER — PREDNISONE 50 MG/1
60 TABLET ORAL 2 TIMES DAILY WITH MEALS
Status: DISPENSED | OUTPATIENT
Start: 2023-11-02 | End: 2023-11-07

## 2023-11-02 RX ORDER — SULFAMETHOXAZOLE AND TRIMETHOPRIM 800; 160 MG/1; MG/1
1 TABLET ORAL 3 TIMES WEEKLY
Status: DISCONTINUED | OUTPATIENT
Start: 2023-11-03 | End: 2023-11-20

## 2023-11-02 RX ORDER — ONDANSETRON 2 MG/ML
4 INJECTION INTRAMUSCULAR; INTRAVENOUS EVERY 8 HOURS PRN
Status: DISCONTINUED | OUTPATIENT
Start: 2023-11-03 | End: 2023-11-05

## 2023-11-02 RX ORDER — LEVOFLOXACIN 250 MG/1
500 TABLET, FILM COATED ORAL
Status: DISCONTINUED | OUTPATIENT
Start: 2023-11-03 | End: 2023-11-05

## 2023-11-02 RX ORDER — SODIUM CHLORIDE 9 MG/ML
20 INJECTION, SOLUTION INTRAVENOUS DAILY PRN
Status: DISCONTINUED | OUTPATIENT
Start: 2023-11-03 | End: 2023-11-05

## 2023-11-02 RX ORDER — METOCLOPRAMIDE HYDROCHLORIDE 5 MG/ML
10 INJECTION INTRAMUSCULAR; INTRAVENOUS ONCE
Status: COMPLETED | OUTPATIENT
Start: 2023-11-02 | End: 2023-11-02

## 2023-11-02 RX ORDER — ACYCLOVIR 200 MG/1
400 CAPSULE ORAL 2 TIMES DAILY
Status: DISCONTINUED | OUTPATIENT
Start: 2023-11-02 | End: 2023-11-03

## 2023-11-02 RX ADMIN — CEFEPIME 2000 MG: 2 INJECTION, POWDER, FOR SOLUTION INTRAVENOUS at 12:28

## 2023-11-02 RX ADMIN — LEVOFLOXACIN 250 MG: 250 TABLET, FILM COATED ORAL at 09:04

## 2023-11-02 RX ADMIN — LEVALBUTEROL HYDROCHLORIDE 1.25 MG: 1.25 SOLUTION RESPIRATORY (INHALATION) at 14:32

## 2023-11-02 RX ADMIN — IPRATROPIUM BROMIDE 0.5 MG: 0.5 SOLUTION RESPIRATORY (INHALATION) at 02:46

## 2023-11-02 RX ADMIN — FLUCONAZOLE 200 MG: 100 TABLET ORAL at 09:04

## 2023-11-02 RX ADMIN — AMLODIPINE BESYLATE 10 MG: 5 TABLET ORAL at 09:05

## 2023-11-02 RX ADMIN — BUSPIRONE HYDROCHLORIDE 30 MG: 10 TABLET ORAL at 17:22

## 2023-11-02 RX ADMIN — NICOTINE 7 MG: 7 PATCH, EXTENDED RELEASE TRANSDERMAL at 09:05

## 2023-11-02 RX ADMIN — METOPROLOL TARTRATE 25 MG: 25 TABLET, FILM COATED ORAL at 09:05

## 2023-11-02 RX ADMIN — LEVALBUTEROL HYDROCHLORIDE 1.25 MG: 1.25 SOLUTION RESPIRATORY (INHALATION) at 19:36

## 2023-11-02 RX ADMIN — BUSPIRONE HYDROCHLORIDE 30 MG: 10 TABLET ORAL at 21:12

## 2023-11-02 RX ADMIN — GABAPENTIN 300 MG: 300 CAPSULE ORAL at 21:09

## 2023-11-02 RX ADMIN — GABAPENTIN 300 MG: 300 CAPSULE ORAL at 09:04

## 2023-11-02 RX ADMIN — FAMOTIDINE 20 MG: 20 TABLET, FILM COATED ORAL at 09:04

## 2023-11-02 RX ADMIN — IPRATROPIUM BROMIDE 0.5 MG: 0.5 SOLUTION RESPIRATORY (INHALATION) at 08:40

## 2023-11-02 RX ADMIN — IPRATROPIUM BROMIDE 0.5 MG: 0.5 SOLUTION RESPIRATORY (INHALATION) at 14:32

## 2023-11-02 RX ADMIN — ACYCLOVIR 400 MG: 200 CAPSULE ORAL at 09:58

## 2023-11-02 RX ADMIN — VANCOMYCIN HYDROCHLORIDE 1000 MG: 1 INJECTION, SOLUTION INTRAVENOUS at 12:27

## 2023-11-02 RX ADMIN — LEVALBUTEROL HYDROCHLORIDE 1.25 MG: 1.25 SOLUTION RESPIRATORY (INHALATION) at 08:40

## 2023-11-02 RX ADMIN — QUETIAPINE FUMARATE 50 MG: 25 TABLET ORAL at 21:09

## 2023-11-02 RX ADMIN — BUSPIRONE HYDROCHLORIDE 30 MG: 10 TABLET ORAL at 09:58

## 2023-11-02 RX ADMIN — CHLORHEXIDINE GLUCONATE 15 ML: 1.2 SOLUTION ORAL at 21:09

## 2023-11-02 RX ADMIN — MELATONIN 3 MG: at 21:09

## 2023-11-02 RX ADMIN — METOCLOPRAMIDE 10 MG: 5 INJECTION, SOLUTION INTRAMUSCULAR; INTRAVENOUS at 21:09

## 2023-11-02 RX ADMIN — LEVALBUTEROL HYDROCHLORIDE 1.25 MG: 1.25 SOLUTION RESPIRATORY (INHALATION) at 02:46

## 2023-11-02 RX ADMIN — ENOXAPARIN SODIUM 40 MG: 40 INJECTION SUBCUTANEOUS at 09:04

## 2023-11-02 RX ADMIN — METOPROLOL TARTRATE 25 MG: 25 TABLET, FILM COATED ORAL at 21:09

## 2023-11-02 RX ADMIN — ACYCLOVIR 400 MG: 200 CAPSULE ORAL at 17:22

## 2023-11-02 RX ADMIN — IPRATROPIUM BROMIDE 0.5 MG: 0.5 SOLUTION RESPIRATORY (INHALATION) at 19:36

## 2023-11-02 RX ADMIN — OXYCODONE HYDROCHLORIDE 5 MG: 5 SOLUTION ORAL at 12:27

## 2023-11-02 RX ADMIN — FAMOTIDINE 20 MG: 20 TABLET, FILM COATED ORAL at 17:22

## 2023-11-02 RX ADMIN — GABAPENTIN 300 MG: 300 CAPSULE ORAL at 17:21

## 2023-11-02 RX ADMIN — OXYCODONE HYDROCHLORIDE 2.5 MG: 5 SOLUTION ORAL at 17:21

## 2023-11-02 RX ADMIN — CHLORHEXIDINE GLUCONATE 15 ML: 1.2 SOLUTION ORAL at 09:04

## 2023-11-02 RX ADMIN — SERTRALINE 75 MG: 25 TABLET, FILM COATED ORAL at 09:05

## 2023-11-02 RX ADMIN — OXYCODONE HYDROCHLORIDE 5 MG: 5 SOLUTION ORAL at 20:53

## 2023-11-02 RX ADMIN — FOLIC ACID 1 MG: 1 TABLET ORAL at 09:05

## 2023-11-02 NOTE — ASSESSMENT & PLAN NOTE
Cuffless trach placed 9/17, transitioned to cuffed on 10/3. Oxygen requirements not improving. For mental health patient is on buspirone, quetiapine, and sertraline. For pain, gabapentin, oxycodone scheduled and prn, prn oxycodone mainly utilized for increased pain during chemo and after a bronch. On Guaifenesin, Atrovent and Xopenex for airway maintenance. No improvement in bilateral consolidation or atelectasis and groundglass opacities on repeat CT and chest x-rays. Patient not receiving proper positive pressure to open up atelectatic lung. Bronchial cx with 3 colonies CoNS. PCP PCR invalid, repeat negative. CTCAP indicates additional groundglass opacity with paraseptal emphysema, which may be contributing to inability to remain off vent. SBTs have been unsuccessful to place patient on trach collar since return to ICU. Patient tolerated high flow all day and overnight. Back on vent overnight, failed trach collar o/n. CXR 10/19 appears unchanged from 10/16 just not good breath during image, less of right lung visualized this time. After 24 hours trial and patient not desaturating or become dyspneic, she was transferred to level 2 step down. She was desatting on HFNC and VBGs showed hypercarbia and hypoxia, due to which she was transferred to critical care. She is being titrated down to 50% FiO2, off of vent since last week. Unable to maintain good sats with lower FiO2. Nurse reported thick yellow/green secretions on suctioning and she was started on antibiotics on 10/29. She has been weaned down from HFNC 58L at 88 overnight and mid flow during the days to continuous mid flow. Last VBG on 11/1 is showing stable hypercarbia. Overnight she did not require high flow. Maintaining saturation >88% at mid flow. She desats but saturation improves immediately after suctioning.     Recent Labs     11/01/23  0507   PHVEN 7.358   [id number] 61.4*   PO2VEN 40.1   [id number] 33.8*   BEVEN 7.2         Plan:  Complete a 7 day course of cefepime and Vancomycin. Day 4. Continue trach collar during the day and at night. Wean down HFNC to a goal FiO2 of 50%  Discussed with RT to maintain mid flow overnight unless she maintains saturation <88%. Goal is to maintain flow <50L. Maintain cuff overnight from 9 pm to 6 am for positive pressure, can open cuff during the day for patient to speak. Discussed with CM. New jannette for STR, she will be able to come to the hospital for chemotherapy infusions and be admitted for 4 days every 3 weeks.

## 2023-11-02 NOTE — PLAN OF CARE
Problem: MOBILITY - ADULT  Goal: Maintain or return to baseline ADL function  Description: INTERVENTIONS:  -  Assess patient's ability to carry out ADLs; assess patient's baseline for ADL function and identify physical deficits which impact ability to perform ADLs (bathing, care of mouth/teeth, toileting, grooming, dressing, etc.)  - Assess/evaluate cause of self-care deficits   - Assess range of motion  - Assess patient's mobility; develop plan if impaired  - Assess patient's need for assistive devices and provide as appropriate  - Encourage maximum independence but intervene and supervise when necessary  - Involve family in performance of ADLs  - Assess for home care needs following discharge   - Consider OT consult to assist with ADL evaluation and planning for discharge  - Provide patient education as appropriate  Outcome: Progressing  Goal: Maintains/Returns to pre admission functional level  Description: INTERVENTIONS:  - Perform BMAT or MOVE assessment daily.   - Set and communicate daily mobility goal to care team and patient/family/caregiver.    - Collaborate with rehabilitation services on mobility goals if consulted  - Out of bed for meals 3 times a day  - Out of bed for toileting  - Record patient progress and toleration of activity level   Outcome: Progressing

## 2023-11-02 NOTE — OCCUPATIONAL THERAPY NOTE
Occupational Therapy Re-evaluation     Patient Name: James Olmstead  RVHJT'N Date: 11/2/2023  Problem List  Principal Problem:    Acute respiratory failure with hypoxia secondary to oropharyngeal mass  Active Problems:    Benign essential hypertension    Pain syndrome, chronic    CKD (chronic kidney disease) stage 3, GFR 30-59 ml/min (HCC)    (HFpEF) heart failure with preserved ejection fraction (HCC)    Ambulatory dysfunction    Angioedema    Oropharyngeal mass    Blister (nonthermal) of left forearm, sequela    Large cell lymphoma (HCC)    Chronic Superficial thrombophlebitis    Generalized abdominal pain    Depression    Pancytopenia due to chemotherapy ([address])        11/02/23 1053   OT Last Visit   OT Visit Date 11/02/23   Note Type   Note type Re-Evaluation   Pain Assessment   Pain Assessment Tool 0-10   Pain Score No Pain   Restrictions/Precautions   Weight Bearing Precautions Per Order No   Other Precautions Chair Alarm; Bed Alarm;Multiple lines;O2;Fall Risk;Telemetry  (Trachcollar 50% FiO2 upon arrival)   Home Living   Additional Comments Please refer to initial OT eval perfomed on 9/21 for home setup   Prior Function   Comments Please refer to initial OT eval performed on 9/21 for PLOF   Lifestyle   Autonomy PTA pt living with daughter in [facility] with [address] [facility], pt requiring (A) with ADLs and IADLs, (+)falls, (-)drives use of quad cane vs RW at baseline   Reciprocal Relationships supportive daughter however daughter does work during the day   Service to Others retired   Intrinsic Gratification pt enjoyed talking about family and this writer's children. Pt also noted to be rachel driven. General   Additional Pertinent History Pt admitted as transfer from [facility] d/t SOB, indubated. Incidental findings of L naso/oropharangeal mass, Trach placed on 9/17, was 100% FiO2, now 50% FiO2. PMHx: large cell lymphoma, chronic supervicial thrombophlebitis, CHF, angioedema, HTN, CKD.  Bipolar, chemo induced neutropenia Family/Caregiver Present No   Subjective   Subjective "I gotta sit down for a second"   ADL   Eating Assistance Unable to assess   Eating Deficit Other (Comment)  (tach/peg)   Grooming Assistance 6  Modified Independent   Grooming Deficit Setup; Wash/dry face  (blow nose sitting EOB)   UB Bathing Assistance 5  Supervision/Setup   LB Bathing Assistance 5  Supervision/Setup   UB Dressing Assistance 5  Supervision/Setup   UB Dressing Deficit Setup;Supervision/safety;Verbal cueing; Increased time to complete; Thread RUE; Thread LUE;Pull around back  (sitting EOB)   LB Dressing Assistance 5  Supervision/Setup   LB Dressing Deficit Setup;Verbal cueing;Supervision/safety; Increased time to complete; Thread RLE into underwear; Thread LLE into underwear;Pull up over hips  (sitting on BSC)   Toileting Assistance  5  Supervision/Setup   Toileting Deficit Setup;Verbal cueing;Supervison/safety; Increased time to complete; Bedside commode;Perineal hygiene  (cues to perform german care, completed in stance)   Bed Mobility   Additional Comments Pt received on BSC upon arrival, seated EOB at end of session w/ PT Navdeep Thompson   Transfers   Sit to Stand 5  Supervision   Additional items Assist x 1; Increased time required;Verbal cues   Stand to Sit 5  Supervision   Additional items Assist x 1; Increased time required;Verbal cues   Stand pivot 5  Supervision   Additional items Assist x 1; Increased time required;Verbal cues  (BSC>EOB)   Toilet transfer 5  Supervision   Additional items Assist x 1; Increased time required;Verbal cues; Commode;Armrests  (BSC at bedside)   Balance   Static Sitting Good   Dynamic Sitting Fair +   Static Standing Fair   Dynamic Standing Fair   Activity Tolerance   Activity Tolerance Patient limited by fatigue  (SOB, fatigue, SpO2)   Medical Staff Made Aware Care coordinated w/ PT Navdeep Thompson   Nurse Made Aware yes, TINO shaffer to see pt and updated on outcomes   RUE Assessment   RUE Assessment WFL  (limited shoulder AROM, generally WFL)   LUE Assessment   LUE Assessment WFL  (limited shoulder AROM, generally WFL)   Hand Function   Gross Motor Coordination Functional   Fine Motor Coordination Functional   Sensation   Light Touch No apparent deficits   Vision-Basic Assessment   Current Vision Wears glasses only for reading   Cognition   Overall Cognitive Status [facility]   Arousal/Participation Alert; Cooperative   Attention Within functional limits   Orientation Level Oriented X4   Memory Within functional limits   Following Commands Follows multistep commands without difficulty   Comments Pt continues to be able to sequence ADLs, problem solve basic tasks. Assessment   Limitation Decreased Safe judgement during ADL;Decreased endurance;Decreased self-care trans;Decreased high-level ADLs; Decreased UE strength  (impaired fxnl mobility, act mike, fxnl reach, standing mike, and strength, safety awareness, insight, and pacing)   Prognosis Good   Assessment Pt is a 77 y.o. female admitted [facility] on 2023 due to SOB from SLB, intubated. Pt now w/ tach collar at 50% FiO2. Pt seen on this date for OT Re-Evaluation due to  goals. Please refer to H&P/ initial OT eval () for detailed PMH and social history. At baseline pt required A w/ ADLs/IADLs from daughter and HHA and used  quad cane vs RW for mobility. Upon re-eval pt performed as is listed above, demonstrating improved functional activity tolerance and ability to perform ADL transfers for toileting. Pt continues to show marked improvement in independence w/ ADLs and requires only S for line management and symptom management for SOB. Pt continues to requires significant oxygen requirements and demonstrates continued limitations w/ endurance. Pt would benefit from continued skilled OT treatment in order to maximize safety, independence and overall performance with ADLs, functional transfers, functional mobility and cognition in order to achieve highest level of function.  Updated goals are listed below   Goals   Patient Goals to catch my breath   LTG Time Frame 10-14   Long Term Goal #1 see goals below   Plan   Treatment Interventions ADL retraining;Functional transfer training; Endurance training;Patient/family training;Equipment evaluation/education; Compensatory technique education;Continued evaluation; Energy conservation   Goal Expiration Date 11/12/23   OT Treatment Day 5   OT Frequency 1-2x/wk   Discharge Recommendation   Rehab Resource Intensity Level, OT II (Moderate Resource Intensity)   Equipment Recommended Bedside commode; Shower/Tub chair with back ($)   Commode Type Standard   Additional Comments  The patient's raw score on the AM-PAC Daily Activity Inpatient Short Form is 21. A raw score of greater than or equal to 19 suggests the patient may benefit from discharge to home. Please refer to the recommendation of the Occupational Therapist for safe discharge planning. AM-PAC Daily Activity Inpatient   Lower Body Dressing 3   Bathing 3   Toileting 3   Upper Body Dressing 4   Grooming 4   Eating 4   Daily Activity Raw Score 21   Daily Activity Standardized Score (Calc for Raw Score >=11) 44.27   AM-PAC Applied Cognition Inpatient   Following a Speech/Presentation 4   Understanding Ordinary Conversation 4   Taking Medications 3   Remembering Where Things Are Placed or Put Away 4   Remembering List of 4-5 Errands 3   Taking Care of Complicated Tasks 3   Applied Cognition Raw Score 21   Applied Cognition Standardized Score 44.3   End of Consult   Patient Position at End of Consult Seated edge of bed; All needs within reach  (PT Raiza Hudson present)   Nurse Communication Nurse aware of consult     GOALS:    *Goals established to promote patient goal of to cath my breath:      *Patient will perform grooming tasks standing at sink with (S) in order to increase overall independence and functional standing tolerance    *LB ADL with mod (I) using AE prn for inc'd independence with self care    *Toileting with mod (I) for clothing management and hygiene to increase hygiene/thoroughness in order to reduce caregiver burden    *Patient will verbalize and demonstrate use of energy conservation/deep breathing techniques and work simplification skills during functional activities with no verbal cues. *ADL transfers with mod (I) for inc'd independence with ADLs/purposeful tasks    *Patient will increase functional mobility to and from bathroom with rolling walker independently to increase independence with toileting    *Patient will increase OOB/sitting tolerance to 2-4 hours per day to increase participation in self-care and leisure tasks with no s/s of exertion. *Increase stand tolerance x 5  m for inc'd tolerance with standing purposeful tasks including light household management    *Participate in 10m UE therex to increase overall stamina/activity tolerance for purposeful tasks    *Patient will improve functional activity tolerance to 20 minutes of sustained functional tasks to increase participation in basic self-care and decrease assistance level. *Patient will engage in ongoing cognitive assessment to assist with safe discharge planning/recommendations.       Cathy Dillard, MARGO, OTR/L    [place] License [id number]  [address] [place], [place] [id number]

## 2023-11-02 NOTE — ASSESSMENT & PLAN NOTE
Lab Results   Component Value Date    EGFR 42 11/02/2023    EGFR 46 11/01/2023    EGFR 40 10/31/2023    CREATININE 1.27 11/02/2023    CREATININE 1.18 11/01/2023    CREATININE 1.33 (H) 10/31/2023     Baseline Cr between 0.75-1.1  Initial HERIBERTO felt 2/2 prerenal azotemia 2/2 diuresis, reduced PO intake +/- ATN. Patient received 2 doses of Bumex IV 2 g as she had not been responding appropriately to IV 40 Lasix daily. Creatinine went up by 0.5 meeting criteria for an HERIBERTO. This may be prerenal intrinsic or postrenal.  Potential prerenal causes include reduced kidney perfusion due to lisinopril. Intrinsic can also be lisinopril due to ATN, AIN from chemo medications, TLS, crystaline nephropathy from acyclovir, rituxan nephrotoxicity, filgrastim glomerulonephritis. Post renal obstructive could be from urine retention from vincristine or cyclophosphamide bladder fibrosis. Suspect most likely due to medications as a combination of prerenal and intrinsic. FENa was 0.2%, UA shows no casts or signs of infection, urine eosinophils 0%. Patient likely has prerenal HERIBERTO from volume depletion. Received 2 L NS and 1 pRBC and cr went up by 0.07 still and Na and Cl went down, suspect that volume prevented further cr rise and free water excretion impaired by kidney function, allowing hyponatremia to increase. Creatinine increase is likely plateaued and went down the following day. Suspect that now that nephrotoxic medications have been stopped she responded well to Lasix and creatinine downtrended. HERIBERTO now resolved. Will be cautious about nephrotoxins and monitor as restarting EPOCH and ppx. Patient gets volume depleted and overloaded very easily. Especially from chemo. 10/19 Heriberto as cr went up by 0.3 in 48 hours from 0.82 on 10/17 to 1.22 on 10/19. Suspect this is prerenal hypovolemic, will see if patient improves with fluids. She gets overloaded easily so if no improvement suspect CRS again. 10/29 HERIBERTO cr went up by 0.3 again. She has 922 ml UOP in last 24 hours. PO likely from lasix 40mg IV given yesterday. She is not hypovolemic. Plan:   Continue to monitor UO/renal function. Avoid nephrotoxic agents  Continue to monitor a.m. BMP.

## 2023-11-02 NOTE — ASSESSMENT & PLAN NOTE
9/14 Neck/ soft tissue CT: Large enhancing soft tissue mass identified within the left neck involving multiple spaces. Centered within the left oropharynx with extensions as described above into multiple spaces. This results in significant airway compromise. 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 Small level 3/level 4 nodes are seen within the neck. Tracheostomy by ENT. Replaced on 9/26. H/o tobacco abuse, daily smoker. On nicotine while inpatient, confirmed with patient she needs nicotine patch. CT soft tissue neck shows reduced mass effect from 9/14 to 10/13. Can consider reducing trach size if continues to improve    Plan:  ENT and heme onc following  Will titrate NRT weekly  As per speech therapist diet advanced to Dysphagia 2. Continue parallel PEG tube feeds for nutrition. See acute respiratory failure and large B cell lymphoma above.

## 2023-11-02 NOTE — ASSESSMENT & PLAN NOTE
Lab Results   Component Value Date    CREATININE 1.27 11/02/2023    CREATININE 1.18 11/01/2023    CREATININE 1.33 (H) 10/31/2023       Likely prerenal.  Second to poor oral intake versus poor urine output. Baseline creatinine 1 to 1.2.     Cr at baseline  Plan:  Urinary retention protocol, Daily weights, I/O  Trend Cr daily

## 2023-11-02 NOTE — PLAN OF CARE
Problem: OCCUPATIONAL THERAPY ADULT  Goal: Performs self-care activities at highest level of function for planned discharge setting. See evaluation for individualized goals. Description: Treatment Interventions: ADL retraining, Functional transfer training, Endurance training, Patient/family training, Equipment evaluation/education, Compensatory technique education, Activityengagement, Energy conservation          See flowsheet documentation for full assessment, interventions and recommendations. Outcome: Progressing  Note: Limitation: Decreased Safe judgement during ADL, Decreased endurance, Decreased self-care trans, Decreased high-level ADLs, Decreased UE strength (impaired fxnl mobility, act mike, fxnl reach, standing mike, and strength, safety awareness, insight, and pacing)  Prognosis: Good  Assessment: Pt is a 77 y.o. female admitted [facility] on 2023 due to SOB from SLB, intubated. Pt now w/ tach collar at 50% FiO2. Pt seen on this date for OT Re-Evaluation due to  goals. Please refer to H&P/ initial OT eval () for detailed PMH and social history. At baseline pt required A w/ ADLs/IADLs from daughter and HHA and used  quad cane vs RW for mobility. Upon re-eval pt performed as is listed above, demonstrating improved functional activity tolerance and ability to perform ADL transfers for toileting. Pt continues to show marked improvement in independence w/ ADLs and requires only S for line management and symptom management for SOB. Pt continues to requires significant oxygen requirements and demonstrates continued limitations w/ endurance. Pt would benefit from continued skilled OT treatment in order to maximize safety, independence and overall performance with ADLs, functional transfers, functional mobility and cognition in order to achieve highest level of function.  Updated goals are listed below     Rehab Resource Intensity Level, OT: II (Moderate Resource Intensity)     Hope Cole OTD, OTR/L  [place] License [id number]  [address] [place] [address] [id number]

## 2023-11-02 NOTE — ASSESSMENT & PLAN NOTE
Large B-cell lymphoma, stage II. First chemo cycle 9/22 4 days of R-EPOCH. 9/27 Rituxan. 9/28 Filgrastim. Acyclovir, Zyloprim, Diflucan, Levaquin, Zofran, Bactrim for chemo prophylaxis, all discontinued with heme-onc's approval as Nafisa reached [phone]. As per heme/onc attending pancytopenia is expected with her chemotherapy. Restarted all ppx 10/13 for [facility] cycle 2, which was done over 4 days. Ppx and filgrastim can be discontinued when patient is no longer neutropenic after this cycle again. Tunneled line in R IJ in place. She received cytoxan on 10/19. Plan:  Patient is planned for chemotherapy infusion tomorrow. Transfuse blood products as needed. Keep Hgb>7 and Plt>20 for chemo   Continue rituximab infusions as per heme/onc plan.   See acute respiratory failure above

## 2023-11-02 NOTE — PROGRESS NOTES
[address] [facility]  Progress Note  Name: Leonora Solis  MRN: [id number]  Unit/Bed#: ICU 03 I Date of Admission: 9/13/2023   Date of Service: 11/2/2023 I Hospital Day: 48    Assessment/Plan   * Acute respiratory failure with hypoxia secondary to oropharyngeal mass  Assessment & Plan  Cuffless trach placed 9/17, transitioned to cuffed on 10/3. Oxygen requirements not improving. For mental health patient is on buspirone, quetiapine, and sertraline. For pain, gabapentin, oxycodone scheduled and prn, prn oxycodone mainly utilized for increased pain during chemo and after a bronch. On Guaifenesin, Atrovent and Xopenex for airway maintenance. No improvement in bilateral consolidation or atelectasis and groundglass opacities on repeat CT and chest x-rays. Patient not receiving proper positive pressure to open up atelectatic lung. Bronchial cx with 3 colonies CoNS. PCP PCR invalid, repeat negative. CTCAP indicates additional groundglass opacity with paraseptal emphysema, which may be contributing to inability to remain off vent. SBTs have been unsuccessful to place patient on trach collar since return to ICU. Patient tolerated high flow all day and overnight. Back on vent overnight, failed trach collar o/n. CXR 10/19 appears unchanged from 10/16 just not good breath during image, less of right lung visualized this time. After 24 hours trial and patient not desaturating or become dyspneic, she was transferred to level 2 step down. She was desatting on HFNC and VBGs showed hypercarbia and hypoxia, due to which she was transferred to critical care. She is being titrated down to 50% FiO2, off of vent since last week. Unable to maintain good sats with lower FiO2. Nurse reported thick yellow/green secretions on suctioning and she was started on antibiotics on 10/29. She has been weaned down from HFNC 58L at 88 overnight and mid flow during the days to continuous mid flow.  Last VBG on 11/1 is showing stable hypercarbia. Overnight she did not require high flow. Maintaining saturation >88% at mid flow. She desats but saturation improves immediately after suctioning. Recent Labs     11/01/23  0507   PHVEN 7.358   [id number] 61.4*   PO2VEN 40.1   SSP8HNR 33.8*   BEVEN 7.2         Plan:  Complete a 7 day course of cefepime and Vancomycin. Day 4. Continue trach collar during the day and at night. Wean down HFNC to a goal FiO2 of 50%  Discussed with RT to maintain mid flow overnight unless she maintains saturation <88%. Goal is to maintain flow <50L. Maintain cuff overnight from 9 pm to 6 am for positive pressure, can open cuff during the day for patient to speak. Discussed with CM. New jannette for STR, she will be able to come to the hospital for chemotherapy infusions and be admitted for 4 days every 3 weeks. Large cell lymphoma ([address])  Assessment & Plan  Large B-cell lymphoma, stage II. First chemo cycle 9/22 4 days of R-EPOCH. 9/27 Rituxan. 9/28 Filgrastim. Acyclovir, Zyloprim, Diflucan, Levaquin, Zofran, Bactrim for chemo prophylaxis, all discontinued with heme-onc's approval as [place]isberg reached 4200. As per heme/onc attending pancytopenia is expected with her chemotherapy. Restarted all ppx 10/13 for [facility] cycle 2, which was done over 4 days. Ppx and filgrastim can be discontinued when patient is no longer neutropenic after this cycle again. Tunneled line in R IJ in place. She received cytoxan on 10/19. Plan:  Patient is planned for chemotherapy infusion tomorrow. Transfuse blood products as needed. Keep Hgb>7 and Plt>20 for chemo   Continue rituximab infusions as per heme/onc plan. See acute respiratory failure above    Oropharyngeal mass  Assessment & Plan  9/14 Neck/ soft tissue CT: Large enhancing soft tissue mass identified within the left neck involving multiple spaces. Centered within the left oropharynx with extensions as described above into multiple spaces.  This results in significant airway compromise. 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 Small level 3/level 4 nodes are seen within the neck. Tracheostomy by ENT. Replaced on 9/26. H/o tobacco abuse, daily smoker. On nicotine while inpatient, confirmed with patient she needs nicotine patch. CT soft tissue neck shows reduced mass effect from 9/14 to 10/13. Can consider reducing trach size if continues to improve    Plan:  ENT and heme onc following  Will titrate NRT weekly  As per speech therapist diet advanced to Dysphagia 2. Continue parallel PEG tube feeds for nutrition. See acute respiratory failure and large B cell lymphoma above. RML pneumonia-resolved as of 9/22/2023  Assessment & Plan  9/13 CT PE study: Patchy consolidation right middle lobe, new from 8/25/2023, compatible with pneumonia. No leukocytosis, no fever/chills. Positive for sore throat, cough. New onset pneumonia after recent hospitalization and antibiotics treatment. 9/14: Bronchoscopy/ ABL. MRSA negative. ABL revealed 2+ Growth of Candida albicans, suspected contaminant. Pancytopenia due to chemotherapy [facility])  Assessment & Plan  Patient received 1 PRBC transfusion on 10/5. Hemoglobin 8.2 s/p 1 PRBC transfusion on 10/25. B/L is 12-14. No sites of bleeding, no black stools. Iron panel indicative of inflammatory anemia. Normal Vitamin B12 levels, negative hemolysis smear. Folate is low 5.5. Plan:  Trend hemoglobin and transfuse for <7. As per heme/onc transfuse irradiated and leukoreduced blood products. Trend platelets  Folic acid 1mg daily supplementation  As per my conversation with Heme/oncology attending, patient is expected to have chemotherapy associated pancytopenia. No further anemia w/u required.     PO (acute kidney injury) (HCC)-resolved as of 9/21/2023  Assessment & Plan  Lab Results   Component Value Date    CREATININE 1.27 11/02/2023    CREATININE 1.18 11/01/2023    CREATININE 1.33 (H) 10/31/2023       Likely prerenal.  Second to poor oral intake versus poor urine output. Baseline creatinine 1 to 1.2. Cr at baseline  Plan:  Urinary retention protocol, Daily weights, I/O  Trend Cr daily    Blister (nonthermal) of left forearm, sequela  Assessment & Plan  Blisters of left upper extremity healing, no signs of infection, continue daily wound care. Generalized abdominal pain  Assessment & Plan  Patient reports abdominal pain which is unchanged since 9/22 no guarding on exam. Takes Famotidine for GERD at home. Alk phos 10/2 145, 10/20 79. CTCAP reveals complex right upper pole renal lesion requiring possible outpatient MRI    Continue Famotidine  On bowel regimen with Senna and Miralax prn    (HFpEF) heart failure with preserved ejection fraction [facility])  Assessment & Plan  Wt Readings from Last 3 Encounters:   11/02/23 76.2 kg (167 lb 15.9 oz)   09/07/23 74.6 kg (164 lb 6.4 oz)   08/30/23 73.3 kg (161 lb 9.6 oz)     Lab Results   Component Value Date    LVEF 60 08/28/2023     (H) 10/28/2023     (H) 09/29/2023     Echo 8/28/23: LVEF 60%. Plan:  K > 4   Measure I/O      Ambulatory dysfunction  Assessment & Plan  2/2 Impacted by chronic pain syndrome + prolonged hospital stay. Continue OOB with PT. PT rec post acute rehab however reportedly Cannot get LTACH placement while getting chemo per CM  She is aware STR SNFs continue to follow and treatment can be done from a SNF level of care. PT would need to be on trach collar for at least 72 hours with no need for the vent. Aware that pt would need to be around 28% FiO2     Depression  Assessment & Plan  Patient on Zoloft 75 daily and Seroquel hs  Patient is depressed, multiple family/palliative discussions. patient is firm that she wants to live and to fight, she is just tired. Currently goal is disease treatment oriented. Full code. No SI/HI ideations. Palliative signed off, will reconsult if patient changes her mind regarding wishes. On 10/20/23 she decided to leave A. Now waiting for CM and machine     Chronic Superficial thrombophlebitis  Assessment & Plan  Right forearm soreness. RUE US: No acute or chronic deep vein thrombosis. 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 PO not severe enough to require renal dosing at this time, will continue to monitor and adjust dosing as needed. Continue DVT ppx with Lovenox 40    Angioedema  Assessment & Plan  POA in [place] ([place] ED: Swollen tongue, soft and hard palate with severe angioedema. Decadron 10 mg, Benadryl 25 mg given. 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 Plan:  Cuffless trach 9/17, cuffed Shiley XLT #7 10/3  See acute respiratory failure and oropharyngeal mass above  Continue to wean vent. CKD (chronic kidney disease) stage 3, GFR 30-59 ml/min [facility])  Assessment & Plan  Lab Results   Component Value Date    EGFR 42 11/02/2023    EGFR 46 11/01/2023    EGFR 40 10/31/2023    CREATININE 1.27 11/02/2023    CREATININE 1.18 11/01/2023    CREATININE 1.33 (H) 10/31/2023     Baseline Cr between 0.75-1.1  Initial PO felt 2/2 prerenal azotemia 2/2 diuresis, reduced PO intake +/- ATN. Patient received 2 doses of Bumex IV 2 g as she had not been responding appropriately to IV 40 Lasix daily. Creatinine went up by 0.5 meeting criteria for an PO. This may be prerenal intrinsic or postrenal.  Potential prerenal causes include reduced kidney perfusion due to lisinopril. Intrinsic can also be lisinopril due to ATN, AIN from chemo medications, TLS, crystaline nephropathy from acyclovir, rituxan nephrotoxicity, filgrastim glomerulonephritis. Post renal obstructive could be from urine retention from vincristine or cyclophosphamide bladder fibrosis. Suspect most likely due to medications as a combination of prerenal and intrinsic. FENa was 0.2%, UA shows no casts or signs of infection, urine eosinophils 0%. Patient likely has prerenal PO from volume depletion.  Received 2 L NS and 1 pRBC and cr went up by 0.07 still and Na and Cl went down, suspect that volume prevented further cr rise and free water excretion impaired by kidney function, allowing hyponatremia to increase. Creatinine increase is likely plateaued and went down the following day. Suspect that now that nephrotoxic medications have been stopped she responded well to Lasix and creatinine downtrended. HERIBERTO now resolved. Will be cautious about nephrotoxins and monitor as restarting EPOCH and ppx. Patient gets volume depleted and overloaded very easily. Especially from chemo. 10/19 Heriberto as cr went up by 0.3 in 48 hours from 0.82 on 10/17 to 1.22 on 10/19. Suspect this is prerenal hypovolemic, will see if patient improves with fluids. She gets overloaded easily so if no improvement suspect CRS again. 10/29 HERIBERTO cr went up by 0.3 again. She has 922 ml UOP in last 24 hours. HERIBERTO likely from lasix 40mg IV given yesterday. She is not hypovolemic. Plan:   Continue to monitor UO/renal function. Avoid nephrotoxic agents  Continue to monitor a.m. BMP. Pain syndrome, chronic  Assessment & Plan  Home regimen: Oxycodone 5 mg BID, Tylenol 650 mg q8 prn. Gabapentin 600 mg TID. Medical marijuana. Lumbar radiculopathy and impaired ambulatory dysfunction secondary to pain. Has bowel regimen and is having bowel movements daily. Patient required additional pain management during previous cycle and has some additional pain from tunneled line placement    Plan:  Continue gabapentin 300 mg TID, oxycodone 5 mg TID, prn Tylenol 650 mg q6. Oxycodone 5mg every 8 hours  Oxycodone 2.5 mg q6 PRN    Benign essential hypertension  Assessment & Plan  Home regimen Coreg 12.5 mg BID, Amlodipine 10 mg daily, and lisinopril 40 mg. All discontinued at this time secondary to hypotension and HERIBERTO since cycle 1. but stable currently without any antihypertensives. Systolic persistently elevated and tachycardic, potentially secondary to pain.  Patient was more hypotensive during last chemo. Coreg contraindicated during chemo, since cycles are every other week, would be better to stick with lopressor during duration of therapy as opposed to switching constantly. Plan:  Monitor vitals. Continue Norvasc 10 and lopressor 25 bid  Prn hydralazine for SBP > 160    Hyperlipidemia-resolved as of 10/26/2023  Assessment & Plan  Lab Results   Component Value Date    CHOLESTEROL 118 10/09/2023    CHOLESTEROL 203 (H) 01/24/2023    CHOLESTEROL 177 08/11/2022     Lab Results   Component Value Date    HDL 14 (L) 10/09/2023    HDL 45 (L) 01/24/2023    HDL 37 (L) 08/11/2022     Lab Results   Component Value Date    TRIG 144 10/09/2023    TRIG 166 (H) 09/16/2023    TRIG 160 (H) 01/24/2023     Lab Results   Component Value Date    NONHDLC 104 10/09/2023    [address] 146 07/12/2018    [address] 207 (H) 04/29/2013     Continue outpatient diet and lifestyle modification. COPD without exacerbation (HCC)-resolved as of 9/26/2023  Assessment & Plan  Continue vent with nebs. Wean off vent. Encephalopathy-resolved as of 9/21/2023  Assessment & Plan  9/19- Pt appeared to be AAO x3. Improving. ICU Core Measures     Vented Patient  VAP Bundle  VAP bundle ordered     A: Assess, Prevent, and Manage Pain Has pain been assessed? Yes  Need for changes to pain regimen? No   B: Both Spontaneous Awakening Trials (SATs) and Spontaneous Breathing Trials (SBTs) Plan to perform spontaneous awakening trial today? N/A      C: Choice of Sedation RASS Goal: 0 Alert and Calm or N/A patient not on sedation  Need for changes to sedation or analgesia regimen? No   D: Delirium CAM-ICU: Negative   E: Early Mobility  Plan for early mobility? Yes   F: Family Engagement Plan for family engagement today? Yes       Antibiotic Review: Continue broad spectrum secondary to severity of illness.      Review of Invasive Devices:      Central access plan: Medications requiring central line      Prophylaxis:  VTE VTE covered by:  enoxaparin, Subcutaneous, 40 mg at 11/02/23 8128       Stress Ulcer  covered byfamotidine (PEPCID) tablet 20 mg [[id number]]       Subjective   HPI: 17-year-old female presented to ED on 9/13 with SOB found to have neck mass and airway obstruction showing large cell lymphoma of the oropharynx s/p chemotherapy x2 and tracheostomy. Anticipating initiation of cycle 3 on Friday, 11/3/2023, she was intermittently vent dependent, no longer requiring vent for her persistent hypoxic respiratory failure supplementing her with HFNC but weaning down to 50% FiO2.     24hr events: Patient desatted once and suctioning was done after which her saturation was again above 88%. She has maintained saturation above 88 % on 12L at 70% FiO2. Review of Systems   Constitutional:  Negative for chills and fever. HENT:  Negative for ear pain and sore throat. Eyes:  Negative for pain and visual disturbance. Respiratory:  Negative for cough and shortness of breath. Cardiovascular:  Negative for chest pain and palpitations. Gastrointestinal:  Negative for abdominal pain and vomiting. Genitourinary:  Negative for dysuria and hematuria. Musculoskeletal:  Negative for arthralgias and back pain. Skin:  Negative for color change and rash. Neurological:  Negative for seizures and syncope. All other systems reviewed and are negative. Objective                            Vitals I/O      Most Recent Min/Max in 24hrs   Temp 98.6 °F (37 °C) Temp  Min: 98.1 °F (36.7 °C)  Max: 98.7 °F (37.1 °C)   Pulse 84 Pulse  Min: 82  Max: 99   Resp (!) 24 Resp  Min: 15  Max: 44   BP 96/65 BP  Min: 96/65  Max: 138/72   O2 Sat 90 % SpO2  Min: 85 %  Max: 99 %      Intake/Output Summary (Last 24 hours) at 11/2/2023 1154  Last data filed at 11/2/2023 0800  Gross per 24 hour   Intake 1254 ml   Output 900 ml   Net 354 ml         Diet Enteral/Parenteral; Tube Feeding with Oral Diet;  Two Telly HN; Bolus; 228; (4x/day) - 8:00AM,12:00PM,4:00PM,8:00PM; 95; Water; Before and After each Bolus; Dysphagia/Modified Consistency; Dysphagia 2-Mechanical Soft; Honey Thick Liquid; Honey T... Invasive Monitoring Physical exam    Physical Exam  Eyes:      Pupils: Pupils are equal, round, and reactive to light. HENT:      Head: Normocephalic and atraumatic. Nose: No congestion. Neck:      Comments: Trach collar  Cardiovascular:      Rate and Rhythm: Normal rate and regular rhythm. Abdominal:      General: There is no distension. Comments: PEG intact for Tube Feedings    Constitutional:       Comments: Sitting in bed upright   Pulmonary:      Effort: Pulmonary effort is normal.      Breath sounds: Normal breath sounds. Neurological:      General: No focal deficit present. Mental Status: She is alert and oriented to person, place and time.             Diagnostic Studies      EKG: NSR  Imaging: no      Medications:  Scheduled PRN   acyclovir, 400 mg, BID  amLODIPine, 10 mg, Daily  busPIRone, 30 mg, TID  cefepime, 2,000 mg, Q12H  chlorhexidine, 15 mL, Q12H ASHLEY  enoxaparin, 40 mg, Q24H ASHLEY  famotidine, 20 mg, BID  fluconazole, 801 mg, Daily  folic acid, 1 mg, Daily  gabapentin, 300 mg, TID  levalbuterol, 1.25 mg, Q6H   And  ipratropium, 0.5 mg, Q6H  levofloxacin, 250 mg, Q24H AHSLEY  melatonin, 3 mg, HS  metoprolol tartrate, 25 mg, Q12H [address]  nicotine, 7 mg, Daily  oxyCODONE, 5 mg, Q8H  QUEtiapine, 50 mg, HS  senna, 8.8 mg, HS  sertraline, 75 mg, Daily  sulfamethoxazole-trimethoprim, 1 tablet, Once per day on Mon Wed Fri  vancomycin, 1,000 mg, Q24H      alteplase, 2 mg, Q1MIN PRN  alteplase, 2 mg, Q1MIN PRN  ondansetron, 4 mg, Q8H PRN  oxyCODONE, 2.5 mg, Q6H PRN       Continuous          Labs:    CBC    Recent Labs     11/01/23  0507 11/02/23  0430   WBC 14.96* 13.13*   HGB 7.5* 7.2*   HCT 24.4* 24.0*    183     BMP    Recent Labs     11/01/23  0507 11/02/23  0430   SODIUM 135 136   K 5.1 5.3    101   CO2 30 32   AGAP 5 3   BUN 36* 36*   CREATININE 1.18 1.27   CALCIUM 9.6 9.8       Coags    No recent results     Additional Electrolytes  Recent Labs     11/01/23  0507   MG 2.2   PHOS 4.5*          Blood Gas    No recent results  Recent Labs     11/01/23  0507   PHVEN 7.358   [id number] 61.4*   PO2VEN 40.1   TBU3ZQT 33.8*   BEVEN 7.2    LFTs  Recent Labs     11/01/23  0507   ALT 23   AST 13   ALKPHOS 94   ALB 2.9*   TBILI 0.22       Infectious  No recent results  Glucose  Recent Labs     11/01/23  0507 11/02/23  0430   GLUC 128 80             Jose Alfredo Sosa MD

## 2023-11-02 NOTE — ASSESSMENT & PLAN NOTE
Lab Results   Component Value Date    CHOLESTEROL 118 10/09/2023    CHOLESTEROL 203 (H) 01/24/2023    CHOLESTEROL 177 08/11/2022     Lab Results   Component Value Date    HDL 14 (L) 10/09/2023    HDL 45 (L) 01/24/2023    HDL 37 (L) 08/11/2022     Lab Results   Component Value Date    TRIG 144 10/09/2023    TRIG 166 (H) 09/16/2023    TRIG 160 (H) 01/24/2023     Lab Results   Component Value Date    NONHDLC 104 10/09/2023    [address] 146 07/12/2018    [address] 207 (H) 04/29/2013     Continue outpatient diet and lifestyle modification.

## 2023-11-02 NOTE — ASSESSMENT & PLAN NOTE
POA in [place] ([place]) ED: Swollen tongue, soft and hard palate with severe angioedema. Decadron 10 mg, Benadryl 25 mg given. 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 Plan:  Cuffless trach 9/17, cuffed Shiley XLT #7 10/3  See acute respiratory failure and oropharyngeal mass above  Continue to wean vent.

## 2023-11-02 NOTE — PROGRESS NOTES
Cornelio Alegre is a 77 y.o. female who is currently ordered Vancomycin IV with management by the Pharmacy Consult service. Relevant clinical data and objective / subjective history reviewed. Vancomycin Assessment:  Indication and Goal AUC/Trough: Pneumonia (goal -600, trough >10), -600, trough >10  Clinical Status:  critical care   Micro:     Renal Function:  SCr: 1.27 mg/dL  CrCl: 38.1 mL/min  Renal replacement: Not on dialysis  Days of Therapy: 5  Current Dose: 1000 mg IV q24h   Vancomycin Plan:  New Dosing: continue current dose   Estimated AUC: 539 mcg*hr/mL  Estimated Trough: 16.5 mcg/mL  Next Level: 11/07 at 0600  Renal Function Monitoring: Daily BMP and [place] will continue to follow closely for s/sx of nephrotoxicity, infusion reactions and appropriateness of therapy. BMP and CBC will be ordered per protocol. We will continue to follow the patient’s culture results and clinical progress daily.     Stormy Downs, Pharmacist

## 2023-11-02 NOTE — QUICK NOTE
Critical Care Interval Transfer Note:    Please refer to progress note from earlier today for full details. Sundeep Huitron is a 69 y.o female who has PMHx of laryngeal mass s/p tracheostomy, large cell lymphoma s/p 2 cycles chemotherapy, pancytopenia with frequent filgrastim infusions, CKD stage 3, HFpEF, HTN, HLD, fatty liver, daily smoker, chronic pain syndrome, admitted on 9/13/2023 (day 49) due to respiratory distress. She is s/p tracheostomy 9/17. She was treated for KARTHIK pneumonia. Intermittently required vent, currently maintaining saturation above >88% on 12L at fio2 70%. Plan for her is to discharge to [facility] who require her to maintain saturation >88% on maximum 50% FiO2. Weaning her down to 50% FiO2 and she has maintained saturation >88% for 24 hours. She had one episode of desatting last night but returned back up after suctioning. Recommend to continue to maintain her on mid flow and suction for desaturation. She is on cefepime and vancomycin for 7 days since her oxygenation was not improving despite one completed antibiotic course. We would like to downstep her to stepdown She has a laryngeal mass s/p 2 cycles of chemotherapy, 3rd cycle tomorrow. Each cycle requiring 4 days of inpatient stay. Barriers to discharge:   Oxygen via trach collar should be 50% Fio2 for her to be discharged to [facility]. Chemotherapy planned for tomorrow, requiring 4 days of inpatient stay. Patient on peg tube feeds for nutrition and advancing diet with the help of speech therapist. Currently on dypsphagia 2 but she has a poor appetite. Reassess as appropriate.     Consults: IP CONSULT TO CASE MANAGEMENT  IP CONSULT TO ENT  IP CONSULT TO ONCOLOGY  IP CONSULT TO PALLIATIVE CARE  INPATIENT CONSULT TO IR  INPATIENT CONSULT TO IR  IP CONSULT TO PHARMACY    Recommended to review admission imaging for incidental findings and document in discharge navigator: Chart reviewed, no known incidental findings noted at this time. Discharge Plan: Anticipate discharge in >72 hrs to rehab facility. Central access plan: Medications requiring central line    PT Recommendations: Post acute rehabilitation services        Patient seen and evaluated by Critical Care today and deemed to be appropriate for transfer to Stepdown Level 2. Spoke to Dr. Uli Odom from [facility] to accept transfer. Critical care can be contacted via [employer] with any questions or concerns.

## 2023-11-02 NOTE — PLAN OF CARE
Problem: PHYSICAL THERAPY ADULT  Goal: Performs mobility at highest level of function for planned discharge setting. See evaluation for individualized goals. Description: Treatment/Interventions: Functional transfer training, LE strengthening/ROM, Elevations, Therapeutic exercise, Endurance training, Patient/family training, Equipment eval/education, Bed mobility, Gait training, Spoke to nursing, Spoke to case management          See flowsheet documentation for full assessment, interventions and recommendations. Note: Prognosis: Fair  Problem List: Decreased strength, Decreased endurance, Impaired balance, Decreased mobility, Impaired judgement, Decreased safety awareness, Decreased skin integrity  Assessment: Pt seen for PT Re-evaluation due to  goals. Pt does demonstrate improvements in functional mobility since initial evaluation. However pt overall progression, has been limited due to medical status. Pt does not require significant physical assistance to perform functional transfers, bed mobility or ambulation. However pt is presenting with significant LE weakness, poor endurance, and poor overall activity tolerance. Given this, in conjunction with pt medical status, pt is at increased risk of falls. Pt would continue to benefit from skilled PT services to address functional deficits and return to improved level of function. Would reccomend a level II disposition at discharge. Barriers to Discharge: Inaccessible home environment, Decreased caregiver support (Pt is at home alone during the day; + PAUL her home)     Rehab Resource Intensity Level, PT: II (Moderate Resource Intensity)    See flowsheet documentation for full assessment.

## 2023-11-02 NOTE — PHYSICAL THERAPY NOTE
Physical Therapy Evaluation    Patient's Name: Keven Dwyer    Admitting Diagnosis  Angioedema, initial encounter Darvin Gravel. 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    Myofascial pain syndrome    Pain syndrome, chronic    Angio-edema, initial encounter    Bipolar disorder, unspecified ([address])    Nicotine dependence    Bone lesion    CKD (chronic kidney disease) stage 3, GFR 30-59 ml/min (HCC)    Acute respiratory failure with hypoxia secondary to oropharyngeal mass    (HFpEF) heart failure with preserved ejection fraction (HCC)    Pulmonary embolism (HCC)    Ambulatory dysfunction    Angioedema    Oropharyngeal mass    Blister (nonthermal) of left forearm, sequela    Large cell lymphoma (HCC)    Chemotherapy induced neutropenia     Chronic Superficial thrombophlebitis    Generalized abdominal pain    Depression    Pancytopenia due to chemotherapy [facility])       Past Medical History  Past Medical History:   Diagnosis Date    Anxiety     Depression     Fatty liver     Hyperlipidemia     Hypertension     Psychiatric disorder     Varicella        Past Surgical History  Past Surgical History:   Procedure Laterality Date    BREAST SURGERY Bilateral     Reduction Procedure    CARDIAC SURGERY       SECTION      x4    DILATION AND CURETTAGE OF UTERUS      ECTOPIC PREGNANCY SURGERY      IR GASTROSTOMY TUBE PLACEMENT  2023    IR TUNNELED CENTRAL LINE PLACEMENT  10/19/2023     [address] INCL FLUOR GDNCE DX W/CELL WASHG SPX N/A 2023    Procedure: BRONCHOSCOPY FLEXIBLE;  Surgeon: Bakari Iniguez MD;  [facility]: [facility];  Service: ENT    TRACHEOSTOMY N/A 2023    Procedure: TRACHEOSTOMY, biopsy of nasopharynx mass;  Surgeon: Bakari Iniguez MD;  [facility];  Service: ENT        23 1205   PT Last Visit   PT Visit Date 23   Note Type   Note type Re-Evaluation   Pain Assessment   Pain Assessment Tool FLACC   Pain Location/Orientation Location: Generalized   Pain Rating: FLACC (Rest) - Face 1   Pain Rating: FLACC (Rest) - Legs 0   Pain Rating: FLACC (Rest) - Activity 0   Pain Rating: FLACC (Rest) - Cry 0   Pain Rating: FLACC (Rest) - Consolability 0   Score: FLACC (Rest) 1   Pain Rating: FLACC (Activity) - Face 1   Pain Rating: FLACC (Activity) - Legs 0   Pain Rating: FLACC (Activity) - Activity 0   Pain Rating: FLACC (Activity) - Cry 0   Pain Rating: FLACC (Activity) - Consolability 0   Score: FLACC (Activity) 1   Restrictions/Precautions   Weight Bearing Precautions Per Order No   Other Precautions Chair Alarm; Bed Alarm;Multiple lines;O2;Fall Risk  (trach collar to supplemental O2,)   Prior Function   Comments please see I.E for PLOF and home set up   General   Family/Caregiver Present No   Cognition   Orientation Level Oriented X4   Following Commands Follows one step commands without difficulty   Comments pt ID by wristband, name and    Subjective   Subjective pt agreeable to PT re-evaluation today   RLE Assessment   RLE Assessment X   Strength RLE   R Hip Flexion 3/5   R Knee Flexion 4-/5   R Knee Extension 3/5   R Ankle Dorsiflexion 3/5   R Hip ABduction 3+/5   LLE Assessment   LLE Assessment X   Strength LLE   L Hip Flexion 3/5   L Knee Extension 3/5   L Ankle Dorsiflexion 3/5   L Knee Flexion 4-/5   L Hip ABduction 3+/5   Bed Mobility   Supine to Sit 5  Supervision   Additional items Increased time required   Sit to Supine Unable to assess   Additional Comments pt OOB in recliner chair at end of session, sits EOB with supervision   Transfers   Sit to Stand 5  Supervision   Additional items Assist x 1; Increased time required;Verbal cues   Stand to Sit 5  Supervision   Additional items Assist x 1; Increased time required;Verbal cues   Toilet transfer 5  Supervision   Additional items Assist x 1; Increased time required;Armrests;Commode   Additional Comments x5 STS performed throughout session, requires vc for steadying upon standing. 30s STS performed, pt performs x2 repetitions   Ambulation/Elevation   Gait pattern Decreased foot clearance; Short stride; Excessively slow   Gait Assistance 4  Minimal assist  (CGA)   Additional items Assist x 1;Verbal cues   Assistive Device Rolling walker   Distance 10'x2, 10'x2   Ambulation/Elevation Additional Comments pt rates ambulatiobn as a 7/10 and 5/10 on modified preston RPE scale. Pt SP O2 to 86-88% post ambulation, pt requres 3-4 minutes between attempts of ambulation for SP O2% to improve greater than 90%   Balance   Static Sitting Good   Dynamic Sitting Fair +   Static Standing Fair   Dynamic Standing Fair   Ambulatory Fair -  (RW)   Activity Tolerance   Activity Tolerance Patient limited by fatigue;Treatment limited secondary to medical complications (Comment)  (decreased SP O2)   Medical Staff Made Aware care coordinated with OT lexee   Nurse Made Aware TINO Vuong pre/post   Assessment   Prognosis Fair   Problem List Decreased strength;Decreased endurance; Impaired balance;Decreased mobility; Impaired judgement;Decreased safety awareness;Decreased skin integrity   Assessment Pt seen for PT Re-evaluation due to  goals. Pt does demonstrate improvements in functional mobility since initial evaluation. However pt overall progression, has been limited due to medical status. Pt does not require significant physical assistance to perform functional transfers, bed mobility or ambulation. However pt is presenting with significant LE weakness, poor endurance, and poor overall activity tolerance. Given this, in conjunction with pt medical status, pt is at increased risk of falls. Pt would continue to benefit from skilled PT services to address functional deficits and return to improved level of function. Would reccomend a level II disposition at discharge.    Goals   Patient Goals to get better   STG Expiration Date 23   Short Term Goal #1 In 10-14 days pt will be able to: 1. Demonstrate ability to perform all aspects of bed mobility independently to improve functional safety. 2. Perform functional transfers independently to facilitate safe return to previous living environment. 3.  Ambulate 50 ft with LRAD and supervision with stable vitals to improve safety with household distances and reduce fall risk. 4. Improve LE strength grades by 1 to increase ease of functional mobility with transfers and gait. 5. Pt will demonstrate improved balance by one grade in order to decrease risk of falls. 6. Tolerate 3 hours OOB to promote improved activity tolerance. 7. Improve 30s STS to at least 10 repetitions to decrease risk of falls. PT Treatment Day 5   Plan   Treatment/Interventions ADL retraining;LE strengthening/ROM; Elevations; Equipment eval/education; Bed mobility;Gait training;Spoke to nursing;Spoke to case management;OT   PT Frequency 2-3x/wk   Discharge Recommendation   Rehab Resource Intensity Level, PT II (Moderate Resource Intensity)   AM-PAC Basic Mobility Inpatient   Turning in Flat Bed Without Bedrails 3   Lying on Back to Sitting on Edge of Flat Bed Without Bedrails 2   Moving Bed to Chair 3   Standing Up From Chair Using Arms 3   Walk in Room 3   Climb 3-5 Stairs With Railing 1   Basic Mobility Inpatient Raw Score 15   Basic Mobility Standardized Score 36.97   Highest Level Of Mobility   JH-HLM Goal 4: Move to chair/commode   JH-HLM Achieved 6: Walk 10 steps or more   End of Consult   Patient Position at End of Consult Bed/Chair alarm activated; Bedside chair; All needs within reach   The patient's AM-PAC Basic Mobility Inpatient Short Form Raw Score is 15. A Raw score of less than or equal to 16 suggests the patient may benefit from discharge to post-acute rehabilitation services. Please also refer to the recommendation of the Physical Therapist for safe discharge planning.     Rogelio Villegas, PT

## 2023-11-02 NOTE — CASE MANAGEMENT
Case Management Progress Note    Patient name Brea Whelan  Location ICU 03/ICU 03 MRN [id number]  : 1955 Date 2023       LOS (days): 50  Geometric Mean LOS (GMLOS) (days): 26.10  Days to GMLOS:-23.9        OBJECTIVE:        Current admission status: Inpatient  Preferred Pharmacy:   [id number] [place] - [address]. [address] [place] [address]  Phone: [phone] Fax: [phone] [facility] [facility] [place] [address] [address] [address]  Phone: [phone] Fax: [phone]    Primary Care Provider: Grzegorz Camacho MD    Primary Insurance: Lamount Drew REP  Secondary Insurance: [facility]    PROGRESS NOTE:    SHIRA met with pt at bedside. She is aware that [facility] continues to follow for dcp. Aware that she will need to be on 50% FiO2 or lower to admit to their facility. Pt aware she is scheduled for Chemo tomorrow and will re-eval on Monday for dc readiness to [facility]. Pt appreciative of the visit. No questions at this time. SHIRA left  for pt , Kendy Galvez, [phone] to update her on dcp. SHIRA received a return call from Kendy Galvez - she is updated on plan for dc. She will contact CM Monday for update.

## 2023-11-02 NOTE — ASSESSMENT & PLAN NOTE
Wt Readings from Last 3 Encounters:   11/02/23 76.2 kg (167 lb 15.9 oz)   09/07/23 74.6 kg (164 lb 6.4 oz)   08/30/23 73.3 kg (161 lb 9.6 oz)     Lab Results   Component Value Date    LVEF 60 08/28/2023     (H) 10/28/2023     (H) 09/29/2023     Echo 8/28/23: LVEF 60%.        Plan:  K > 4   Measure I/O

## 2023-11-03 ENCOUNTER — APPOINTMENT (INPATIENT)
Dept: CT IMAGING | Facility: HOSPITAL | Age: 68
DRG: 004 | End: 2023-11-03
Payer: COMMERCIAL

## 2023-11-03 ENCOUNTER — APPOINTMENT (INPATIENT)
Dept: GASTROENTEROLOGY | Facility: HOSPITAL | Age: 68
DRG: 004 | End: 2023-11-03
Payer: COMMERCIAL

## 2023-11-03 PROBLEM — D61.810 PANCYTOPENIA DUE TO CHEMOTHERAPY (HCC): Status: RESOLVED | Noted: 2023-10-23 | Resolved: 2023-11-03

## 2023-11-03 PROBLEM — E87.5 HYPERKALEMIA: Status: ACTIVE | Noted: 2023-11-03

## 2023-11-03 LAB
ALBUMIN SERPL BCP-MCNC: 3 G/DL (ref 3.5–5)
ALBUMIN SERPL ELPH-MCNC: 2.27 G/DL (ref 3.2–5.1)
ALBUMIN SERPL ELPH-MCNC: 46.3 % (ref 48–70)
ALP SERPL-CCNC: 85 U/L (ref 34–104)
ALPHA1 GLOB SERPL ELPH-MCNC: 0.51 G/DL (ref 0.15–0.47)
ALPHA1 GLOB SERPL ELPH-MCNC: 10.4 % (ref 1.8–7)
ALPHA2 GLOB SERPL ELPH-MCNC: 1.02 G/DL (ref 0.42–1.04)
ALPHA2 GLOB SERPL ELPH-MCNC: 20.8 % (ref 5.9–14.9)
ALT SERPL W P-5'-P-CCNC: 19 U/L (ref 7–52)
ANION GAP SERPL CALCULATED.3IONS-SCNC: 2 MMOL/L
ANION GAP SERPL CALCULATED.3IONS-SCNC: 7 MMOL/L
AST SERPL W P-5'-P-CCNC: 14 U/L (ref 13–39)
ATRIAL RATE: 111 BPM
BASOPHILS # BLD MANUAL: 0.16 THOUSAND/UL (ref 0–0.1)
BASOPHILS NFR MAR MANUAL: 1 % (ref 0–1)
BETA GLOB ABNORMAL SERPL ELPH-MCNC: 0.26 G/DL (ref 0.31–0.57)
BETA1 GLOB SERPL ELPH-MCNC: 5.3 % (ref 4.7–7.7)
BETA2 GLOB SERPL ELPH-MCNC: 8.5 % (ref 3.1–7.9)
BETA2+GAMMA GLOB SERPL ELPH-MCNC: 0.42 G/DL (ref 0.2–0.58)
BILIRUB SERPL-MCNC: 0.2 MG/DL (ref 0.2–1)
BUN SERPL-MCNC: 44 MG/DL (ref 5–25)
BUN SERPL-MCNC: 48 MG/DL (ref 5–25)
CALCIUM ALBUM COR SERPL-MCNC: 10.6 MG/DL (ref 8.3–10.1)
CALCIUM SERPL-MCNC: 10 MG/DL (ref 8.4–10.2)
CALCIUM SERPL-MCNC: 10.5 MG/DL (ref 8.4–10.2)
CALCIUM SERPL-MCNC: 9.8 MG/DL (ref 8.4–10.2)
CHLORIDE SERPL-SCNC: 102 MMOL/L (ref 96–108)
CHLORIDE SERPL-SCNC: 103 MMOL/L (ref 96–108)
CO2 SERPL-SCNC: 29 MMOL/L (ref 21–32)
CO2 SERPL-SCNC: 31 MMOL/L (ref 21–32)
CREAT SERPL-MCNC: 1.38 MG/DL (ref 0.6–1.3)
CREAT SERPL-MCNC: 1.51 MG/DL (ref 0.6–1.3)
EOSINOPHIL # BLD MANUAL: 0 THOUSAND/UL (ref 0–0.4)
EOSINOPHIL NFR BLD MANUAL: 0 % (ref 0–6)
ERYTHROCYTE [DISTWIDTH] IN BLOOD BY AUTOMATED COUNT: 16.6 % (ref 11.6–15.1)
GAMMA GLOB ABNORMAL SERPL ELPH-MCNC: 0.43 G/DL (ref 0.4–1.66)
GAMMA GLOB SERPL ELPH-MCNC: 8.7 % (ref 6.9–22.3)
GFR SERPL CREATININE-BSD FRML MDRD: 34 ML/MIN/1.73SQ M
GFR SERPL CREATININE-BSD FRML MDRD: 38 ML/MIN/1.73SQ M
GLUCOSE SERPL-MCNC: 159 MG/DL (ref 65–140)
GLUCOSE SERPL-MCNC: 184 MG/DL (ref 65–140)
GLUCOSE SERPL-MCNC: 96 MG/DL (ref 65–140)
HCT VFR BLD AUTO: 23.5 % (ref 34.8–46.1)
HGB BLD-MCNC: 7.2 G/DL (ref 11.5–15.4)
IGG/ALB SER: 0.86 {RATIO} (ref 1.1–1.8)
INTERPRETATION UR IFE-IMP: NORMAL
LDH SERPL-CCNC: 211 U/L (ref 140–271)
LYMPHOCYTES # BLD AUTO: 16 % (ref 14–44)
LYMPHOCYTES # BLD AUTO: 2.67 THOUSAND/UL (ref 0.6–4.47)
MAGNESIUM SERPL-MCNC: 2.3 MG/DL (ref 1.9–2.7)
MCH RBC QN AUTO: 30.6 PG (ref 26.8–34.3)
MCHC RBC AUTO-ENTMCNC: 30.6 G/DL (ref 31.4–37.4)
MCV RBC AUTO: 100 FL (ref 82–98)
MICROCYTES BLD QL AUTO: PRESENT
MONOCYTES # BLD AUTO: 0.78 THOUSAND/UL (ref 0–1.22)
MONOCYTES NFR BLD: 5 % (ref 4–12)
MYELOCYTES NFR BLD MANUAL: 2 % (ref 0–1)
NEUTROPHILS # BLD MANUAL: 11.76 THOUSAND/UL (ref 1.85–7.62)
NEUTS BAND NFR BLD MANUAL: 5 % (ref 0–8)
NEUTS SEG NFR BLD AUTO: 70 % (ref 43–75)
P AXIS: 54 DEGREES
PHOSPHATE SERPL-MCNC: 4.1 MG/DL (ref 2.3–4.1)
PLATELET # BLD AUTO: 222 THOUSANDS/UL (ref 149–390)
PLATELET BLD QL SMEAR: ADEQUATE
PMV BLD AUTO: 9.2 FL (ref 8.9–12.7)
POTASSIUM SERPL-SCNC: 6 MMOL/L (ref 3.5–5.3)
POTASSIUM SERPL-SCNC: 6.2 MMOL/L (ref 3.5–5.3)
POTASSIUM SERPL-SCNC: 6.2 MMOL/L (ref 3.5–5.3)
PR INTERVAL: 174 MS
PROT PATTERN SERPL ELPH-IMP: ABNORMAL
PROT SERPL-MCNC: 4.9 G/DL (ref 6.4–8.2)
PROT SERPL-MCNC: 5.9 G/DL (ref 6.4–8.4)
QRS AXIS: 72 DEGREES
QRSD INTERVAL: 92 MS
QT INTERVAL: 320 MS
QTC INTERVAL: 435 MS
RBC # BLD AUTO: 2.35 MILLION/UL (ref 3.81–5.12)
RBC MORPH BLD: PRESENT
SODIUM SERPL-SCNC: 136 MMOL/L (ref 135–147)
SODIUM SERPL-SCNC: 138 MMOL/L (ref 135–147)
T WAVE AXIS: 60 DEGREES
TOXIC GRANULES BLD QL SMEAR: PRESENT
URATE SERPL-MCNC: 3.9 MG/DL (ref 2–7.5)
VARIANT LYMPHS # BLD AUTO: 1 %
VENTRICULAR RATE: 111 BPM
WBC # BLD AUTO: 15.68 THOUSAND/UL (ref 4.31–10.16)

## 2023-11-03 PROCEDURE — 84165 PROTEIN E-PHORESIS SERUM: CPT | Performed by: STUDENT IN AN ORGANIZED HEALTH CARE EDUCATION/TRAINING PROGRAM

## 2023-11-03 PROCEDURE — 85007 BL SMEAR W/DIFF WBC COUNT: CPT | Performed by: NURSE PRACTITIONER

## 2023-11-03 PROCEDURE — 93010 ELECTROCARDIOGRAM REPORT: CPT | Performed by: INTERNAL MEDICINE

## 2023-11-03 PROCEDURE — 82310 ASSAY OF CALCIUM: CPT

## 2023-11-03 PROCEDURE — 71250 CT THORAX DX C-: CPT

## 2023-11-03 PROCEDURE — 80053 COMPREHEN METABOLIC PANEL: CPT | Performed by: NURSE PRACTITIONER

## 2023-11-03 PROCEDURE — 82785 ASSAY OF IGE: CPT | Performed by: NURSE PRACTITIONER

## 2023-11-03 PROCEDURE — 99232 SBSQ HOSP IP/OBS MODERATE 35: CPT | Performed by: INTERNAL MEDICINE

## 2023-11-03 PROCEDURE — NC001 PR NO CHARGE: Performed by: INTERNAL MEDICINE

## 2023-11-03 PROCEDURE — 84100 ASSAY OF PHOSPHORUS: CPT | Performed by: NURSE PRACTITIONER

## 2023-11-03 PROCEDURE — 93005 ELECTROCARDIOGRAM TRACING: CPT

## 2023-11-03 PROCEDURE — 94640 AIRWAY INHALATION TREATMENT: CPT

## 2023-11-03 PROCEDURE — 94760 N-INVAS EAR/PLS OXIMETRY 1: CPT

## 2023-11-03 PROCEDURE — 86334 IMMUNOFIX E-PHORESIS SERUM: CPT | Performed by: STUDENT IN AN ORGANIZED HEALTH CARE EDUCATION/TRAINING PROGRAM

## 2023-11-03 PROCEDURE — 83735 ASSAY OF MAGNESIUM: CPT | Performed by: NURSE PRACTITIONER

## 2023-11-03 PROCEDURE — 0202U NFCT DS 22 TRGT SARS-COV-2: CPT | Performed by: STUDENT IN AN ORGANIZED HEALTH CARE EDUCATION/TRAINING PROGRAM

## 2023-11-03 PROCEDURE — 86003 ALLG SPEC IGE CRUDE XTRC EA: CPT | Performed by: NURSE PRACTITIONER

## 2023-11-03 PROCEDURE — 80048 BASIC METABOLIC PNL TOTAL CA: CPT | Performed by: NURSE PRACTITIONER

## 2023-11-03 PROCEDURE — 83615 LACTATE (LD) (LDH) ENZYME: CPT | Performed by: INTERNAL MEDICINE

## 2023-11-03 PROCEDURE — G1004 CDSM NDSC: HCPCS

## 2023-11-03 PROCEDURE — 84132 ASSAY OF SERUM POTASSIUM: CPT

## 2023-11-03 PROCEDURE — 99223 1ST HOSP IP/OBS HIGH 75: CPT | Performed by: STUDENT IN AN ORGANIZED HEALTH CARE EDUCATION/TRAINING PROGRAM

## 2023-11-03 PROCEDURE — 99291 CRITICAL CARE FIRST HOUR: CPT | Performed by: INTERNAL MEDICINE

## 2023-11-03 PROCEDURE — 85027 COMPLETE CBC AUTOMATED: CPT | Performed by: NURSE PRACTITIONER

## 2023-11-03 PROCEDURE — 82948 REAGENT STRIP/BLOOD GLUCOSE: CPT

## 2023-11-03 PROCEDURE — 84550 ASSAY OF BLOOD/URIC ACID: CPT | Performed by: NURSE PRACTITIONER

## 2023-11-03 RX ORDER — BUDESONIDE 0.5 MG/2ML
0.5 INHALANT ORAL
Status: DISCONTINUED | OUTPATIENT
Start: 2023-11-03 | End: 2023-12-04 | Stop reason: HOSPADM

## 2023-11-03 RX ORDER — SODIUM POLYSTYRENE SULFONATE 4.1 MEQ/G
15 POWDER, FOR SUSPENSION ORAL; RECTAL ONCE
Status: DISCONTINUED | OUTPATIENT
Start: 2023-11-03 | End: 2023-11-03

## 2023-11-03 RX ORDER — MIDAZOLAM HYDROCHLORIDE 2 MG/2ML
2 INJECTION, SOLUTION INTRAMUSCULAR; INTRAVENOUS ONCE
Status: DISCONTINUED | OUTPATIENT
Start: 2023-11-03 | End: 2023-11-08

## 2023-11-03 RX ORDER — FUROSEMIDE 10 MG/ML
40 INJECTION INTRAMUSCULAR; INTRAVENOUS ONCE
Status: COMPLETED | OUTPATIENT
Start: 2023-11-03 | End: 2023-11-03

## 2023-11-03 RX ORDER — FENTANYL CITRATE 50 UG/ML
100 INJECTION, SOLUTION INTRAMUSCULAR; INTRAVENOUS ONCE
Status: DISCONTINUED | OUTPATIENT
Start: 2023-11-03 | End: 2023-11-09

## 2023-11-03 RX ORDER — CALCIUM GLUCONATE 20 MG/ML
1 INJECTION, SOLUTION INTRAVENOUS ONCE
Status: COMPLETED | OUTPATIENT
Start: 2023-11-03 | End: 2023-11-03

## 2023-11-03 RX ORDER — FORMOTEROL FUMARATE 20 UG/2ML
20 SOLUTION RESPIRATORY (INHALATION)
Status: DISCONTINUED | OUTPATIENT
Start: 2023-11-03 | End: 2023-12-04 | Stop reason: HOSPADM

## 2023-11-03 RX ORDER — DEXTROSE MONOHYDRATE 25 G/50ML
25 INJECTION, SOLUTION INTRAVENOUS ONCE
Status: COMPLETED | OUTPATIENT
Start: 2023-11-03 | End: 2023-11-03

## 2023-11-03 RX ADMIN — NICOTINE 7 MG: 7 PATCH, EXTENDED RELEASE TRANSDERMAL at 09:10

## 2023-11-03 RX ADMIN — BUDESONIDE 0.5 MG: 0.5 INHALANT ORAL at 19:15

## 2023-11-03 RX ADMIN — ACYCLOVIR 400 MG: 200 CAPSULE ORAL at 09:11

## 2023-11-03 RX ADMIN — SERTRALINE 75 MG: 25 TABLET, FILM COATED ORAL at 09:05

## 2023-11-03 RX ADMIN — GABAPENTIN 300 MG: 300 CAPSULE ORAL at 09:05

## 2023-11-03 RX ADMIN — OXYCODONE HYDROCHLORIDE 5 MG: 5 SOLUTION ORAL at 21:33

## 2023-11-03 RX ADMIN — FUROSEMIDE 40 MG: 10 INJECTION, SOLUTION INTRAMUSCULAR; INTRAVENOUS at 14:20

## 2023-11-03 RX ADMIN — CALCIUM GLUCONATE 1 G: 20 INJECTION, SOLUTION INTRAVENOUS at 14:23

## 2023-11-03 RX ADMIN — ACYCLOVIR 400 MG: 200 CAPSULE ORAL at 18:22

## 2023-11-03 RX ADMIN — GABAPENTIN 300 MG: 300 CAPSULE ORAL at 21:34

## 2023-11-03 RX ADMIN — SULFAMETHOXAZOLE AND TRIMETHOPRIM 1 TABLET: 800; 160 TABLET ORAL at 09:05

## 2023-11-03 RX ADMIN — SULFAMETHOXAZOLE AND TRIMETHOPRIM 1 TABLET: 800; 160 TABLET ORAL at 09:11

## 2023-11-03 RX ADMIN — PREDNISONE 100 MG: 50 TABLET ORAL at 09:12

## 2023-11-03 RX ADMIN — INSULIN HUMAN 10 UNITS: 100 INJECTION, SOLUTION PARENTERAL at 14:19

## 2023-11-03 RX ADMIN — IPRATROPIUM BROMIDE 0.5 MG: 0.5 SOLUTION RESPIRATORY (INHALATION) at 13:16

## 2023-11-03 RX ADMIN — VANCOMYCIN HYDROCHLORIDE 750 MG: 750 INJECTION, SOLUTION INTRAVENOUS at 13:06

## 2023-11-03 RX ADMIN — MELATONIN 3 MG: at 21:34

## 2023-11-03 RX ADMIN — FAMOTIDINE 20 MG: 20 TABLET, FILM COATED ORAL at 18:22

## 2023-11-03 RX ADMIN — FORMOTEROL FUMARATE DIHYDRATE 20 MCG: 20 SOLUTION RESPIRATORY (INHALATION) at 19:15

## 2023-11-03 RX ADMIN — IPRATROPIUM BROMIDE 0.5 MG: 0.5 SOLUTION RESPIRATORY (INHALATION) at 07:23

## 2023-11-03 RX ADMIN — LEVOFLOXACIN 250 MG: 250 TABLET, FILM COATED ORAL at 09:10

## 2023-11-03 RX ADMIN — DEXTROSE MONOHYDRATE 25 ML: 25 INJECTION, SOLUTION INTRAVENOUS at 14:16

## 2023-11-03 RX ADMIN — CEFEPIME 2000 MG: 2 INJECTION, POWDER, FOR SOLUTION INTRAVENOUS at 12:05

## 2023-11-03 RX ADMIN — FLUCONAZOLE 400 MG: 100 TABLET ORAL at 09:06

## 2023-11-03 RX ADMIN — BUDESONIDE 0.5 MG: 0.5 INHALANT ORAL at 13:17

## 2023-11-03 RX ADMIN — LEVALBUTEROL HYDROCHLORIDE 1.25 MG: 1.25 SOLUTION RESPIRATORY (INHALATION) at 13:16

## 2023-11-03 RX ADMIN — QUETIAPINE FUMARATE 50 MG: 25 TABLET ORAL at 21:33

## 2023-11-03 RX ADMIN — FORMOTEROL FUMARATE DIHYDRATE 20 MCG: 20 SOLUTION RESPIRATORY (INHALATION) at 13:17

## 2023-11-03 RX ADMIN — ONDANSETRON: 2 INJECTION INTRAMUSCULAR; INTRAVENOUS at 09:04

## 2023-11-03 RX ADMIN — FAMOTIDINE 20 MG: 20 TABLET, FILM COATED ORAL at 09:06

## 2023-11-03 RX ADMIN — ENOXAPARIN SODIUM 40 MG: 40 INJECTION SUBCUTANEOUS at 09:04

## 2023-11-03 RX ADMIN — LEVALBUTEROL HYDROCHLORIDE 1.25 MG: 1.25 SOLUTION RESPIRATORY (INHALATION) at 19:15

## 2023-11-03 RX ADMIN — BUSPIRONE HYDROCHLORIDE 30 MG: 10 TABLET ORAL at 09:07

## 2023-11-03 RX ADMIN — IPRATROPIUM BROMIDE 0.5 MG: 0.5 SOLUTION RESPIRATORY (INHALATION) at 19:15

## 2023-11-03 RX ADMIN — FOLIC ACID 1 MG: 1 TABLET ORAL at 09:06

## 2023-11-03 RX ADMIN — FLUCONAZOLE 200 MG: 100 TABLET ORAL at 09:11

## 2023-11-03 RX ADMIN — BUSPIRONE HYDROCHLORIDE 30 MG: 10 TABLET ORAL at 21:34

## 2023-11-03 RX ADMIN — FOSAPREPITANT DIMEGLUMINE 150 MG: 150 INJECTION, POWDER, LYOPHILIZED, FOR SOLUTION INTRAVENOUS at 09:25

## 2023-11-03 RX ADMIN — LEVOFLOXACIN 500 MG: 250 TABLET, FILM COATED ORAL at 09:12

## 2023-11-03 RX ADMIN — IPRATROPIUM BROMIDE 0.5 MG: 0.5 SOLUTION RESPIRATORY (INHALATION) at 03:14

## 2023-11-03 RX ADMIN — CHLORHEXIDINE GLUCONATE 15 ML: 1.2 SOLUTION ORAL at 21:34

## 2023-11-03 RX ADMIN — ETOPOSIDE: 20 INJECTION, SOLUTION INTRAVENOUS at 10:10

## 2023-11-03 RX ADMIN — OXYCODONE HYDROCHLORIDE 5 MG: 5 SOLUTION ORAL at 12:20

## 2023-11-03 RX ADMIN — LEVALBUTEROL HYDROCHLORIDE 1.25 MG: 1.25 SOLUTION RESPIRATORY (INHALATION) at 07:23

## 2023-11-03 RX ADMIN — GABAPENTIN 300 MG: 300 CAPSULE ORAL at 16:46

## 2023-11-03 RX ADMIN — LEVALBUTEROL HYDROCHLORIDE 1.25 MG: 1.25 SOLUTION RESPIRATORY (INHALATION) at 03:14

## 2023-11-03 RX ADMIN — METOPROLOL TARTRATE 25 MG: 25 TABLET, FILM COATED ORAL at 21:34

## 2023-11-03 RX ADMIN — CEFEPIME 2000 MG: 2 INJECTION, POWDER, FOR SOLUTION INTRAVENOUS at 01:30

## 2023-11-03 RX ADMIN — BUSPIRONE HYDROCHLORIDE 30 MG: 10 TABLET ORAL at 16:46

## 2023-11-03 RX ADMIN — PREDNISONE 100 MG: 50 TABLET ORAL at 16:46

## 2023-11-03 RX ADMIN — CHLORHEXIDINE GLUCONATE 15 ML: 1.2 SOLUTION ORAL at 09:03

## 2023-11-03 RX ADMIN — ACYCLOVIR 400 MG: 200 CAPSULE ORAL at 09:03

## 2023-11-03 NOTE — ASSESSMENT & PLAN NOTE
9/14 Neck/ soft tissue CT: Large enhancing soft tissue mass identified within the left neck involving multiple spaces. Centered within the left oropharynx with extensions as described above into multiple spaces. This results in significant airway compromise. 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 Small level 3/level 4 nodes are seen within the neck. Tracheostomy by ENT. Replaced on 9/26. H/o tobacco abuse, daily smoker. On nicotine while inpatient, confirmed with patient she needs nicotine patch. CT soft tissue neck shows reduced mass effect from 9/14 to 10/13. Plan:  ENT and heme onc following  Will titrate NRT weekly  As per speech therapist diet advanced to Dysphagia 2 with honey thick liquids. Continue parallel PEG tube feeds for nutrition. See acute respiratory failure and large B cell lymphoma above.

## 2023-11-03 NOTE — PROGRESS NOTES
[id number]  Progress Note  Name: Renaldo Ba  MRN: [id number]  Unit/Bed#: ICU 03 I Date of Admission: 9/13/2023   Date of Service: 11/3/2023 I Hospital Day: 46    Assessment/Plan   * Acute respiratory failure with hypoxia secondary to oropharyngeal mass  Assessment & Plan  Cuffless trach placed 9/17, transitioned to cuffed on 10/3. Oxygen requirements not improving. For mental health patient is on buspirone, quetiapine, and sertraline. For pain, gabapentin, oxycodone scheduled and prn, prn oxycodone mainly utilized for increased pain during chemo and after a bronch. On Guaifenesin, Atrovent and Xopenex for airway maintenance. No improvement in bilateral consolidation or atelectasis and groundglass opacities on repeat CT and chest x-rays. Patient not receiving proper positive pressure to open up atelectatic lung. Bronchial cx with 3 colonies CoNS. PCP PCR invalid, repeat negative. CTCAP indicates additional groundglass opacity with paraseptal emphysema, which may be contributing to inability to remain off vent. SBTs have been unsuccessful to place patient on trach collar since return to ICU. Patient tolerated high flow all day and overnight. Back on vent overnight, failed trach collar o/n. CXR 10/19 appears unchanged from 10/16 just not good breath during image, less of right lung visualized this time. After 24 hours trial and patient not desaturating or become dyspneic, she was transferred to level 2 step down. She was desatting on HFNC and VBGs showed hypercarbia and hypoxia, due to which she was transferred to critical care. She is being titrated down to 50% FiO2, off of vent since last week. Unable to maintain good sats with lower FiO2. Nurse reported thick yellow/green secretions on suctioning and she was started on antibiotics on 10/29. She has been weaned down from HFNC 58L at 88 overnight and mid flow during the days to continuous mid flow.  Last VBG on 11/1 is showing stable hypercarbia. Overnight she did not require high flow. Maintaining saturation >88% at mid flow. She desats but saturations tend to improve after suctioning. Recent Labs     11/01/23  0507   PHVEN 7.358   TUM1OPL 61.4*   PO2VEN 40.1   [id number] 33.8*   BEVEN 7.2       Plan:  Complete a 7 day course of cefepime and Vancomycin. Day 6/7. Continue trach collar during the day and at night. Wean HFNC as tolerated to a goal FiO2 of <50%; has been % at 12L trach today  Discussed with RT to maintain mid flow overnight unless she maintains saturation <88%. Goal is to maintain flow <50L. Maintain cuff overnight from 9 pm to 6 am for positive pressure, can open cuff during the day for patient to speak. Discussed with CM. New jannette for STR, she will be able to come to the hospital for chemotherapy infusions and be admitted for 4 days every 3 weeks. Hyperkalemia  Assessment & Plan  Recent Labs     11/01/23  0507 11/02/23  0430 11/03/23  0446   K 5.1 5.3 6.0*   MG 2.2  --  2.3       Plan:  kayexalate, 1 amp of D50 followed by 10 units IV regular insulin, and 1 amp of 10% calcium gluconate IV to stabilize cardiac membranes  Check potassium level q4h until returns to wnl  BMP tomorrow a.m. Pancytopenia due to chemotherapy [facility])  Assessment & Plan  Patient received 1 PRBC transfusion on 10/5. Hemoglobin 8.2 s/p 1 PRBC transfusion on 10/25. B/L is 12-14. No sites of bleeding, no black stools. Iron panel indicative of inflammatory anemia. Normal Vitamin B12 levels, negative hemolysis smear. Folate is low 5.5. Plan:  Trend hemoglobin and transfuse for <7. As per heme/onc transfuse irradiated and leukoreduced blood products. Trend platelets  Folic acid 1mg daily supplementation  As per my conversation with Heme/oncology attending, patient is expected to have chemotherapy associated pancytopenia. No further anemia w/u required.     Depression  Assessment & Plan  Patient on Zoloft 75 daily and Seroquel hs Patient is depressed, multiple family/palliative discussions. patient is firm that she wants to live and to fight, she is just tired. Currently goal is disease treatment oriented. Full code. No SI/HI ideations. Palliative signed off, will reconsult if patient changes her mind regarding wishes. On 10/20/23 she had wanted to leave AMA. Ultimately chose to stay    Generalized abdominal pain  Assessment & Plan  Patient reports abdominal pain which is unchanged since 9/22 no guarding on exam. Takes Famotidine for GERD at home. Alk phos 10/2 145, 10/20 79. CTCAP reveals complex right upper pole renal lesion requiring possible outpatient MRI    Continue Famotidine  On bowel regimen with Senna    Chronic Superficial thrombophlebitis  Assessment & Plan  Right forearm soreness. RUE US: No acute or chronic deep vein thrombosis. 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 Will continue to monitor and adjust dosing as needed. Continue DVT ppx with Lovenox 40    Large cell lymphoma ([address])  Assessment & Plan  Large B-cell lymphoma, stage II. First chemo cycle 9/22 4 days of R-EPOCH. 9/27 Rituxan. 9/28 Filgrastim. Acyclovir, Zyloprim, Diflucan, Levaquin, Zofran, Bactrim for chemo prophylaxis, all discontinued with heme-onc's approval as Nafisa reached 4200. As per heme/onc attending pancytopenia is expected with her chemotherapy. Restarted all ppx 10/13 for [facility] cycle 2, which was done over 4 days. Ppx and filgrastim can be discontinued when patient is no longer neutropenic after this cycle again. Tunneled line in R IJ in place. She received cytoxan on 10/19. Plan:  Patient is planned for chemotherapy infusion today. Transfuse blood products as needed. Keep Hgb>7 and Plt>20 for chemo   Continue rituximab infusions as per heme/onc plan.   See acute respiratory failure above    Blister (nonthermal) of left forearm, sequela  Assessment & Plan  Blisters of left upper extremity healing, no signs of infection, continue daily wound care. Oropharyngeal mass  Assessment & Plan  9/14 Neck/ soft tissue CT: Large enhancing soft tissue mass identified within the left neck involving multiple spaces. Centered within the left oropharynx with extensions as described above into multiple spaces. This results in significant airway compromise. 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 Small level 3/level 4 nodes are seen within the neck. Tracheostomy by ENT. Replaced on 9/26. H/o tobacco abuse, daily smoker. On nicotine while inpatient, confirmed with patient she needs nicotine patch. CT soft tissue neck shows reduced mass effect from 9/14 to 10/13. Plan:  ENT and heme onc following  Will titrate NRT weekly  As per speech therapist diet advanced to Dysphagia 2 with honey thick liquids. Continue parallel PEG tube feeds for nutrition. See acute respiratory failure and large B cell lymphoma above. Angioedema  Assessment & Plan  POA in [facility] ED: Swollen tongue, soft and hard palate with severe angioedema. Decadron 10 mg, Benadryl 25 mg given. 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 Plan:  Cuffless trach 9/17, cuffed Shiley XLT #7 10/3  See acute respiratory failure and oropharyngeal mass above  Continue to wean vent. Ambulatory dysfunction  Assessment & Plan  2/2 Impacted by chronic pain syndrome + prolonged hospital stay. Continue OOB with PT. PT rec post acute rehab however reportedly Cannot get LTACH placement while getting chemo per CM  She is aware STR SNFs continue to follow and treatment can be done from a SNF level of care. PT would need to be on trach collar for at least 72 hours with no need for the vent.  Aware that pt would need to be around 28% FiO2     (HFpEF) heart failure with preserved ejection fraction [facility])  Assessment & Plan  Wt Readings from Last 3 Encounters:   11/03/23 77.1 kg (169 lb 15.6 oz)   09/07/23 74.6 kg (164 lb 6.4 oz)   08/30/23 73.3 kg (161 lb 9.6 oz) Lab Results   Component Value Date    LVEF 60 08/28/2023     (H) 10/28/2023     (H) 09/29/2023     Echo 8/28/23: LVEF 60%. Plan:  Maintain K > 4;  Na 136  Measure I/O  Daily weights       CKD (chronic kidney disease) stage 3, GFR 30-59 ml/min [facility])  Assessment & Plan  Lab Results   Component Value Date    EGFR 38 11/03/2023    EGFR 42 11/02/2023    EGFR 46 11/01/2023    CREATININE 1.38 (H) 11/03/2023    CREATININE 1.27 11/02/2023    CREATININE 1.18 11/01/2023     Baseline Cr between 0.75-1.1  Initial PO felt 2/2 prerenal azotemia 2/2 diuresis, reduced PO intake +/- ATN. Patient received 2 doses of Bumex IV 2 g as she had not been responding appropriately to IV 40 Lasix daily. Creatinine went up by 0.5 meeting criteria for an PO. This may be prerenal intrinsic or postrenal.  Potential prerenal causes include reduced kidney perfusion due to lisinopril. Intrinsic can also be lisinopril due to ATN, AIN from chemo medications, TLS, crystaline nephropathy from acyclovir, rituxan nephrotoxicity, filgrastim glomerulonephritis. Post renal obstructive could be from urine retention from vincristine or cyclophosphamide bladder fibrosis. Suspect most likely due to medications as a combination of prerenal and intrinsic. FENa was 0.2%, UA shows no casts or signs of infection, urine eosinophils 0%. Patient likely has prerenal PO from volume depletion. Received 2 L NS and 1 pRBC and cr went up by 0.07 still and Na and Cl went down, suspect that volume prevented further cr rise and free water excretion impaired by kidney function, allowing hyponatremia to increase. Creatinine increase is likely plateaued and went down the following day. Suspect that now that nephrotoxic medications have been stopped she responded well to Lasix and creatinine downtrended. PO now resolved. Will be cautious about nephrotoxins and monitor as restarting EPOCH and ppx. Patient gets volume depleted and overloaded very easily. Especially from chemo. 10/19 Heriberto as cr went up by 0.3 in 48 hours from 0.82 on 10/17 to 1.22 on 10/19. Suspect this is prerenal hypovolemic, will see if patient improves with fluids. She gets overloaded easily so if no improvement suspect CRS again. 10/29 HERIBERTO cr went up by 0.3 again. She has 922 ml UOP in last 24 hours. HERIBERTO likely from lasix 40mg IV given yesterday. She is not hypovolemic. 11/3 Cr increased by 0.11    Plan:   Continue to monitor UO/renal function. Avoid nephrotoxic agents  Continue to monitor a.m. BMP. Pain syndrome, chronic  Assessment & Plan  Home regimen: Oxycodone 5 mg BID, Tylenol 650 mg q8 prn. Gabapentin 600 mg TID. Medical marijuana. Lumbar radiculopathy and impaired ambulatory dysfunction secondary to pain. Has bowel regimen and is having bowel movements. Plan:  Continue gabapentin 300 mg TID, oxycodone 5 mg TID, prn Tylenol 650 mg q6. Oxycodone 5mg every 8 hours  Oxycodone 2.5 mg q6 PRN    Benign essential hypertension  Assessment & Plan  Home regimen Coreg 12.5 mg BID, Amlodipine 10 mg daily, and lisinopril 40 mg. All discontinued at this time secondary to hypotension and HERIBERTO since cycle 1. Coreg contraindicated during chemo, since cycles are every other week, would be better to stick with lopressor during duration of therapy as opposed to switching constantly. Plan:  Monitor vitals. Continue Norvasc 10 and lopressor 25 bid  Consider hydralazine for SBP > 160               VTE Pharmacologic Prophylaxis:   VTE Score: 7 High Risk (Score >/= 5) - Pharmacological DVT Prophylaxis Ordered: Enoxaparin (Lovenox). Sequential Compression Devices Ordered. Mechanical VTE Prophylaxis in Place: Yes    Patient Centered Rounds: I have performed bedside rounds with nursing staff today. Discussions with Specialists or Other Care Team Provider: IM team, CM    Education and Discussions with Family / Patient: Updated  (daughter) via phone.     Current Length of Stay: 51 day(s)    Current Patient Status: Inpatient     Discharge Plan / Estimated Discharge Date: Anticipate discharge in > 72 hrs to rehab facility. Code Status: Level 1 - Full Code      Subjective: Today, Tory Olivares is lying in bed. She reports that she is having increased mucus in her tracheostomy and is requiring high oxygen today with frequent desaturations that she reports seem to improve with suctioning. Objective:     Vitals:   Temp (24hrs), Av.8 °F (37.1 °C), Min:98.3 °F (36.8 °C), Max:99.3 °F (37.4 °C)    Temp:  [98.3 °F (36.8 °C)-99.3 °F (37.4 °C)] 99.3 °F (37.4 °C)  HR:  [91-95] 95  Resp:  [20-21] 21  BP: (109-121)/(55-62) 109/55  SpO2:  [90 %-96 %] 90 %  Body mass index is 32.51 kg/m². Input and Output Summary (last 24 hours): Intake/Output Summary (Last 24 hours) at 11/3/2023 1405  Last data filed at 11/3/2023 0226  Gross per 24 hour   Intake 961 ml   Output 400 ml   Net 561 ml       Physical Exam:     Physical Exam  Vitals reviewed. Constitutional:       Appearance: She is ill-appearing. HENT:      Nose: No rhinorrhea. Eyes:      General: No scleral icterus. Pupils: Pupils are equal, round, and reactive to light. Neck:      Trachea: Tracheostomy present. Cardiovascular:      Rate and Rhythm: Normal rate and regular rhythm. Pulses: Normal pulses. Heart sounds: Normal heart sounds. No murmur heard. Pulmonary:      Breath sounds: Rhonchi present. No wheezing or rales. Comments: 12L at 77% O2  Abdominal:      General: Bowel sounds are normal.      Palpations: Abdomen is soft. Musculoskeletal:      Right lower leg: No edema. Left lower leg: No edema. Skin:     General: Skin is warm and dry. Capillary Refill: Capillary refill takes less than 2 seconds. Neurological:      Mental Status: She is alert and oriented to person, place, and time.    Psychiatric:         Mood and Affect: Mood normal.         Behavior: Behavior normal. Additional Data:     Labs:  Results from last 7 days   Lab Units 11/03/23  0446   WBC Thousand/uL 15.68*   HEMOGLOBIN g/dL 7.2*   HEMATOCRIT % 23.5*   PLATELETS Thousands/uL 222   BANDS PCT % 5   LYMPHO PCT % 16   MONO PCT % 5   EOS PCT % 0     Results from last 7 days   Lab Units 11/03/23  1310 11/03/23  0446   SODIUM mmol/L  --  136   POTASSIUM mmol/L 6.2* 6.0*   CHLORIDE mmol/L  --  103   CO2 mmol/L  --  31   BUN mg/dL  --  44*   CREATININE mg/dL  --  1.38*   ANION GAP mmol/L  --  2   CALCIUM mg/dL 10.0 9.8   ALBUMIN g/dL  --  3.0*   TOTAL BILIRUBIN mg/dL  --  0.20   ALK PHOS U/L  --  85   ALT U/L  --  19   AST U/L  --  14   GLUCOSE RANDOM mg/dL  --  96         Results from last 7 days   Lab Units 11/03/23  1327   POC GLUCOSE mg/dl 184*         Results from last 7 days   Lab Units 10/29/23  1249 10/28/23  0427   PROCALCITONIN ng/ml 0.24 0.29*       Imaging: Reviewed radiology reports from this admission including: chest xray and procedure reports    Recent Cultures (last 7 days):     Results from last 7 days   Lab Units 10/28/23  2217   SPUTUM CULTURE  2+ Growth of   GRAM STAIN RESULT  No Epithelial cells seen*  2+ Disintegrating polys*  Rare Gram positive cocci in pairs*       Lines/Drains:  Invasive Devices       Central Venous Catheter Line  Duration             CVC Central Lines 10/19/23 Double Right Internal jugular 15 days              Drain  Duration             Gastrostomy/Enterostomy Percutaneous Endoscopic Gastrostomy (PEG) 16 Fr. LUQ 36 days              Airway  Duration             Surgical Airway Shiley Cuffed 47 days                    Telemetry:   Telemetry Orders (From admission, onward)               24 Hour Telemetry Monitoring  Continuous x 24 Hours (Telem)        Question:  Reason for 24 Hour Telemetry  Answer:  Metabolic/electrolyte disturbance with high probability of dysrhythmia.  K level <3 or >6 OR KCL infusion >10mEq/hr                        Last 24 Hours Medication List:   Current Facility-Administered Medications   Medication Dose Route Frequency Provider Last Rate    acyclovir  400 mg Oral BID J Luis Courser, CRNP      acyclovir  400 mg Oral BID J Luis Courser, CRNP      alteplase  2 mg Intracatheter Q1MIN PRN J Luis Courser, CRNP      alteplase  2 mg Intracatheter Q1MIN PRN J Luis Courser, CRNP      alteplase  2 mg Intracatheter Q1MIN PRN J Luis Courser, CRNP      amLODIPine  10 mg Oral Daily Maria Guadalupe Mercado, RANDEE      budesonide  0.5 mg Nebulization Q12H Maria Guadalupe Mercado, RANDEE      busPIRone  30 mg Oral TID J Luis Courser, CRNP      calcium gluconate  1 g Intravenous Once RANDEE Chavarria      cefepime  2,000 mg Intravenous Q12H J Luis Courser, ANDREANP 2,000 mg (11/03/23 1205)    chlorhexidine  15 mL Mouth/Throat Q12H [address] RANDEE Chavarria      [START ON 11/7/2023] cyclophosphamide (CYTOXAN) 1,350 mg in sodium chloride 0.9 % 250 mL IVPB  750 mg/m2 (Treatment Plan Recorded) Intravenous Once RANDEE Chavarria      dextrose  25 mL Intravenous Once RANDEE Chavarria      DOXOrubicin (ADRIAMYCIN) 18 mg, etoposide (TOPOSAR) 67.6 mg, vinCRIStine (ONCOVIN) 0.7 mg in sodium chloride 0.9 % 500 mL infusion   Intravenous Q24H RANDEE Chavarria 23 mL/hr at 11/03/23 1010    enoxaparin  40 mg Subcutaneous Q24H [address] RANDEE Chavarria      famotidine  20 mg Oral BID RANDEE Chavarria      fluconazole  200 mg Oral Daily RANDEE Chavarria      fluconazole  400 mg Oral Daily RANDEE Chavarria      folic acid  1 mg Oral Daily RANDEE Chavarria      formoterol  20 mcg Nebulization Q12H RANDEE Chavarria      furosemide  40 mg Intravenous Once RANDEE Chavarria      gabapentin  300 mg Oral TID RANDEE Chavarria      insulin regular  10 Units Intravenous Once Maria Guadalupe L Walbert, CRNP      levalbuterol  1.25 mg Nebulization Q6H RANDEE Chavarria      And    ipratropium  0.5 mg Nebulization Q6H RANDEE Chavarria      levofloxacin 250 mg Oral Q24H [address] Maria Guadalupe L Mattbert, CRNP      levofloxacin  500 mg Oral Q24H [address] Maria Guadalupe L Mattbert, CRNP      melatonin  3 mg Oral HS Maria Guadalupe L Mattbert, CRNP      metoprolol tartrate  25 mg Oral Q12H [address] Maria Guadalupe L Mattbert, CRNP      nicotine  7 mg Transdermal Daily Maria Guadalupe L Jess, CRNP      ondansetron (ZOFRAN) 16 mg, dexamethasone (DECADRON) 10 mg in sodium chloride 0.9 % 50 mL IVPB   Intravenous Q24H Lubertha [place]kill, CRNP 150 mL/hr at 11/03/23 0904    ondansetron  4 mg Intravenous Q8H PRN Maria Guadalupe L Mattbert, CRNP      ondansetron  4 mg Intravenous Q8H PRN Lubertha [place], CRNP      oxyCODONE  2.5 mg Oral Q6H PRN Lubertha [place], CRNP      oxyCODONE  5 mg Oral Q8H Maria Guadalupe L Jess, CRNP      predniSONE  60 mg/m2 (Treatment Plan Recorded) Oral BID With Meals Maria Guadalupe SORAIDA Mercado, RANDEE      QUEtiapine  50 mg Oral HS Maria Guadalupe L Jess, CRNP      senna  8.8 mg Per NG Tube HS Maria Guadalupe L Mattbert, CRNP      sertraline  75 mg Per PEG Tube Daily Maria GuadalupeRANDEE Dubois      [START ON 11/7/2023] sodium chloride  2,000 mL Intravenous Once Maria Guadalupe SORAIDA Mercado, ANDREANP      sodium chloride  20 mL/hr Intravenous Daily PRN Maria Guadalupe SORAIDA Mercado, RANDEE      sulfamethoxazole-trimethoprim  1 tablet Oral Once per day on Mon Wed Fri Maria Guadalupe L Jess, CRNP      sulfamethoxazole-trimethoprim  1 tablet Oral Once per day on Mon Wed Fri Maria Guadalupe L Jess, CRNP      vancomycin  750 mg Intravenous Q24H Lubertha [place], CRNP 750 mg (11/03/23 1306)        Today, Patient Was Seen By: Eddie Hansen DO    ** Please Note: This note has been constructed using a voice recognition system.  **

## 2023-11-03 NOTE — ASSESSMENT & PLAN NOTE
9/14 Neck/ soft tissue CT: Large enhancing soft tissue mass identified within the left neck involving multiple spaces. Centered within the left oropharynx with extensions as described above into multiple spaces. This results in significant airway compromise. 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 Small level 3/level 4 nodes are seen within the neck. Tracheostomy by ENT. Replaced on 9/26. H/o tobacco abuse, daily smoker. On nicotine while inpatient, confirmed with patient she needs nicotine patch. CT soft tissue neck shows reduced mass effect from 9/14 to 10/13. Can consider reducing trach size if continues to improve    Plan:  ENT and heme onc following  As per speech therapist diet  Dysphagia 2. Continue parallel PEG tube feeds for nutrition. See acute respiratory failure and large B cell lymphoma above.

## 2023-11-03 NOTE — ASSESSMENT & PLAN NOTE
Cuffless trach placed 9/17, transitioned to cuffed on 10/3. Oxygen requirements not improving. For mental health patient is on buspirone, quetiapine, and sertraline. For pain, gabapentin, oxycodone scheduled and prn, prn oxycodone mainly utilized for increased pain during chemo and after a bronch. On Guaifenesin, Atrovent and Xopenex for airway maintenance. No improvement in bilateral consolidation or atelectasis and groundglass opacities on repeat CT and chest x-rays. Patient not receiving proper positive pressure to open up atelectatic lung. Bronchial cx with 3 colonies CoNS. PCP PCR invalid, repeat negative. CTCAP indicates additional groundglass opacity with paraseptal emphysema, which may be contributing to inability to remain off vent. SBTs have been unsuccessful to place patient on trach collar since return to ICU. Patient tolerated high flow all day and overnight. Back on vent overnight, failed trach collar o/n. CXR 10/19 appears unchanged from 10/16 just not good breath during image, less of right lung visualized this time. After 24 hours trial and patient not desaturating or become dyspneic, she was transferred to level 2 step down. She was desatting on HFNC and VBGs showed hypercarbia and hypoxia, due to which she was transferred to critical care. She is being titrated down to 50% FiO2, off of vent since last week. Unable to maintain good sats with lower FiO2. Nurse reported thick yellow/green secretions on suctioning and she was started on antibiotics on 10/29. She has been weaned down from HFNC 58L at 88 overnight and mid flow during the days to continuous mid flow. Last VBG on 11/1 is showing stable hypercarbia. Overnight she did not require high flow. Maintaining saturation >88% at mid flow. She desats but saturations tend to improve after suctioning.     Recent Labs     11/01/23  0507   PHVEN 7.358   [id number] 61.4*   PO2VEN 40.1   [id number] 33.8*   BEVEN 7.2       Plan:  Complete a 7 day course of cefepime and Vancomycin. Day 6/7. Continue trach collar during the day and at night. Wean HFNC as tolerated to a goal FiO2 of <50%; has been % at 12L trach today  Discussed with RT to maintain mid flow overnight unless she maintains saturation <88%. Goal is to maintain flow <50L. Maintain cuff overnight from 9 pm to 6 am for positive pressure, can open cuff during the day for patient to speak. Discussed with CM. New jannette for STR, she will be able to come to the hospital for chemotherapy infusions and be admitted for 4 days every 3 weeks.

## 2023-11-03 NOTE — ASSESSMENT & PLAN NOTE
Home regimen: Oxycodone 5 mg BID, Tylenol 650 mg q8 prn. Gabapentin 600 mg TID. Medical marijuana. Lumbar radiculopathy and impaired ambulatory dysfunction secondary to pain. Has bowel regimen and is having bowel movements. Plan:  Continue gabapentin 300 mg TID, oxycodone 5 mg TID, prn Tylenol 650 mg q6.     Oxycodone 5mg every 8 hours  Oxycodone 2.5 mg q6 PRN

## 2023-11-03 NOTE — ASSESSMENT & PLAN NOTE
Patient on Zoloft 75 daily and Seroquel hs  Patient is depressed, multiple family/palliative discussions. patient is firm that she wants to live and to fight, she is just tired. Currently goal is disease treatment oriented. Full code. No SI/HI ideations. Palliative signed off, will reconsult if patient changes her mind regarding wishes.

## 2023-11-03 NOTE — ASSESSMENT & PLAN NOTE
Recent Labs     11/01/23  0507 11/02/23  0430 11/03/23  0446   K 5.1 5.3 6.0*   MG 2.2  --  2.3     Etiology: possibly tumor lysis  Plan:  kayexalate, 1 amp of D50 followed by 10 units IV regular insulin, and 1 amp of 10% calcium gluconate IV to stabilize cardiac membranes  Also consider albuterol and lasix if still elevated. Check EKG  Check potassium level q4h until returns to wnl  BMP tomorrow a.m.

## 2023-11-03 NOTE — PROGRESS NOTES
Progress Note - Medical Oncology:  Ahmad Alpers 77 y.o. female MRN: [id number]  Unit/Bed#: ICU 03 Encounter: [id number]     Assessment and Plan:  1. 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 Will initiate Cycle 3 Day 1 of R-EPOCH on Friday, November 3rd. Plan for repeat PET-CT on hospital discharge to evaluate for disease response. Discussed the above-mentioned with patient. She expressed understanding and agreement. 1. Initiate Cycle 3 Day 1: R-EPOCH on Friday, November 3rd. 2. Plan for repeat PET-CT on hospital discharge to evaluate disease response. 2. Acute on Subacute Normocytic Anemia with Intermittent Transfusion Requirement:  Of note, patient has a subacute history of normocytic anemia dating back to September 2023. From September 2015 through August 2023, baseline hemoglobin was within the 13.0-15.6 gm/dL range. Hemoglobin now varies from 7.0-9.3 gm/dL; with a normal mean corpuscular volume, and elevated red cell distribution width. Ancillary-studies, as follows: Iron Panel: Iron: 17 Ferritin: 519 Percent Saturation: 10% TIBC: 165 Vitamin B12: 761 Folate: 5.5. Reticulocyte Index: 0.1. Patient has no evidence of obvious gastrointestinal, or genitourinary bleeding. She is not on oral antiplatelet therapy, or chronic anticoagulation (Note: She is presently on Lovenox 40 mg Daily for VTE-prophylaxis). Highly-suspect acute on subacute anemia requiring blood product transfusion, is secondary to chemotherapy-induced myelosuppression superimposed on a background of anemia of chronic inflammation related to malignancy, with an additional component of folic acid deficiency.  Would recommend supplementation with Folic Acid 1 mg Daily. Continue to transfuse at hemoglobin less than 7.0 gm/dL (Leukoreduced, and irradiated blood products only). Monitor counts daily. Patient is in agreement. 1. Supplement with Folic Acid 1 mg Daily. 2. Transfuse at Hemoglobin less than 7.0 gm/dL. A. Note: Please transfuse irradiated, and leukoreduced blood products. Subjective: Interval Events: Patient was seen and examined in the Intensive Care Unit. She was resting comfortably in bed. No acute events noted overnight. Patient was minimally participatory on my evaluation. She is agreeable to initiation of Cycle 3 of chemotherapy today. No further questions or concerns at this time. Objective:  Vital Signs:      Vitals:     10/26/23 1100   BP: 136/67   Pulse: 97   Resp: (!) 28   Temp: 98.7 °F (37.1 °C)   SpO2: 99%      Physical Exam:   General: Alert, and oriented; Chronically-Ill Appearing  HEENT: Atraumatic, and normocephalic; PERRLA; EOMI; Moist mucosal membranes  Neck: Trachea midline; No neck masses, thyromegaly, or cervical lymphadenopathy present on my exam  Cardiovascular: Regular rate and rhythm; No murmurs, rubs, or gallops appreciated; No peripheral edema  Respiratory: Diminished Breathsounds; Tracheostomy in Place  Abdomen: Soft, non-tender; Non-distended; No organomegaly appreciated; Bowel sounds present in 4-quadrants; Left Upper Quadrant PEG-Tube  Extremities: No obvious deformities; No peripheral edema; Moves all  Neurologic: Appropriately alert, and oriented to Person, Place, and Time; No focal neurologic deficits     Lab, Imaging and other studies:      Patient was seen and discussed with attending physician, Lita Lopes M.D.     Eve Corea D.O.   Hematology-Oncology Fellow (PGY-4)

## 2023-11-03 NOTE — ASSESSMENT & PLAN NOTE
Large B-cell lymphoma, stage II. First chemo cycle 9/22 4 days of R-EPOCH. 9/27 Rituxan. 9/28 Filgrastim. Acyclovir, Zyloprim, Diflucan, Levaquin, Zofran, Bactrim for chemo prophylaxis, all discontinued with heme-onc's approval as Nafisa reached 4200. As per heme/onc attending pancytopenia is expected with her chemotherapy. Restarted all ppx 10/13 for [facility] [place] cycle 2, which was done over 4 days. Ppx and filgrastim can be discontinued when patient is no longer neutropenic after this cycle again. Tunneled line in R IJ in place. She received cytoxan on 10/19. Plan:  Patient is planned for chemotherapy infusion today. Transfuse blood products as needed. Keep Hgb>7 and Plt>20 for chemo   Continue rituximab infusions as per heme/onc plan.   See acute respiratory failure above

## 2023-11-03 NOTE — ASSESSMENT & PLAN NOTE
Patient on Zoloft 75 daily and Seroquel hs  Patient is depressed, multiple family/palliative discussions. patient is firm that she wants to live and to fight, she is just tired. Currently goal is disease treatment oriented. Full code. No SI/HI ideations. Palliative signed off, will reconsult if patient changes her mind regarding wishes. On 10/20/23 she had wanted to leave [facility].  Ultimately chose to stay

## 2023-11-03 NOTE — ASSESSMENT & PLAN NOTE
Home regimen Coreg 12.5 mg BID, Amlodipine 10 mg daily, and lisinopril 40 mg. All discontinued at this time secondary to hypotension and PO since cycle 1. Coreg contraindicated during chemo, since cycles are every other week, would be better to stick with lopressor during duration of therapy as opposed to switching constantly. Plan:  Monitor vitals.   Continue Norvasc 10 and lopressor 25 bid  Consider hydralazine for SBP > 160

## 2023-11-03 NOTE — PROGRESS NOTES
PULMONOLOGY PROGRESS NOTE     Name: Kemar Chávez   Age & Sex: 77 y.o. female   MRN: [id number]  Unit/Bed#: ICU 03   Encounter: [id number]    PATIENT INFORMATION     Name: Kemar Chávez   Age & Sex: 77 y.o. female   MRN: [id number]  Hospital Stay Days: 46    ASSESSMENT/PLAN     Assessment:   Acute hypoxic respiratory failure  Oropharyngeal mass occluding upper airway s/p tracheostomy  COPD without acute exacerbation  RML pneumonia  HFpEF  Chemotherapy-induced pancytopenia    Plan:  Patient with occasional episodes of hypoxia which improved with suctioning and temporarily increasing FiO2. Recently with desaturation event secondary to increased secretions. This improved after suctioning and exchanging inner cannula. She does have a history of COPD. Continue Atrovent/Xopenex TID and add Pulmicort/Perforomist BID. Start SoluMedrol 40 mg IV BID. Recommend frequent inline suctioning and daily exchange of inner cannula for now. Will start Vest therapy as well. OOB to chair as tolerated. PT/OT. Remainder of care per primary team.       SUBJECTIVE     Patient seen and examined. Occasional events of oxygen desaturation. She recovers with suctioning. She denies any chest pain or shortness of breath. She reports feeling tired and wants to get out of bed. OBJECTIVE     Vitals:    23 0710 23 0724 23 0951 23 1013   BP: 109/55      BP Location: Right arm      Pulse: 95      Resp: 21      Temp: 99.3 °F (37.4 °C)      TempSrc: Oral      SpO2: 95% 93% 91% 90%   Weight: 77.1 kg (169 lb 15.6 oz)      Height: 5' 0.63" (1.54 m)         Temperature:   Temp (24hrs), Av.8 °F (37.1 °C), Min:98.3 °F (36.8 °C), Max:99.3 °F (37.4 °C)    Temperature: 99.3 °F (37.4 °C)  Intake & Output:  I/O          07 07 07 07 07 07    P. O.       NG/ 915     IV Piggyback 360 470     Feedings 494 952     Total Intake(mL/kg) 1424 (18.7) 2337 (30.3)     Urine (mL/kg/hr) 1400 (0.8) 900 (0.5)     Emesis/NG output  0     Stool       Total Output 1400 900     Net +24 +1437            Unmeasured Urine Occurrence  1 x           Weights:   IBW (Ideal Body Weight): 46.95 kg    Body mass index is 32.51 kg/m². Weight (last 2 days)       Date/Time Weight    11/03/23 0710 77.1 (169.97)    11/03/23 0708 77.1 (169.97)    11/03/23 0706 77.1 (169.97)    11/03/23 0206 77.1 (169.97)    11/02/23 0213 76.2 (167.99)    11/01/23 0500 78.3 (172.62)          Physical Exam  Vitals reviewed. Constitutional:       General: She is not in acute distress. Appearance: She is not ill-appearing or toxic-appearing. HENT:      Head: Normocephalic. Eyes:      General: No scleral icterus. Pupils: Pupils are equal, round, and reactive to light. Neck:      Trachea: Tracheostomy present. Cardiovascular:      Rate and Rhythm: Normal rate and regular rhythm. Heart sounds: No murmur heard. No gallop. Pulmonary:      Effort: Pulmonary effort is normal. No respiratory distress. Breath sounds: Rhonchi present. No wheezing or rales. Abdominal:      General: Bowel sounds are normal.      Palpations: Abdomen is soft. Musculoskeletal:      Cervical back: Normal range of motion. Right lower leg: No edema. Left lower leg: No edema. Skin:     General: Skin is warm. Capillary Refill: Capillary refill takes less than 2 seconds. Neurological:      Mental Status: She is alert and oriented to person, place, and time. Mental status is at baseline. Cranial Nerves: No cranial nerve deficit. Psychiatric:         Mood and Affect: Mood normal.           LABORATORY DATA     Labs: I have personally reviewed pertinent reports.   Results from last 7 days   Lab Units 11/03/23  0446 11/02/23  0430 11/01/23  0507 10/31/23  0509 10/30/23  0553 10/29/23  0550   WBC Thousand/uL 15.68* 13.13* 14.96*   < > 18.52* 22.30*   HEMOGLOBIN g/dL 7.2* 7.2* 7.5*   < > 8.3* 8.2*   HEMATOCRIT % 23.5* 24.0* 24.4*   < > 26.4* 26.9*   PLATELETS Thousands/uL 222 183 173   < > 157 159   MONO PCT % 5  --   --   --  8 13*   EOS PCT % 0  --   --   --  0 0    < > = values in this interval not displayed. Results from last 7 days   Lab Units 11/03/23  0446 11/02/23  0430 11/01/23  0507 10/31/23  0509   POTASSIUM mmol/L 6.0* 5.3 5.1 5.0   CHLORIDE mmol/L 103 101 100 100   CO2 mmol/L 31 32 30 31   BUN mg/dL 44* 36* 36* 34*   CREATININE mg/dL 1.38* 1.27 1.18 1.33*   CALCIUM mg/dL 9.8 9.8 9.6 9.7   ALK PHOS U/L 85  --  94 99   ALT U/L 19  --  23 28   AST U/L 14  --  13 16     Results from last 7 days   Lab Units 11/03/23 0446 11/01/23  0507 10/31/23  0509   MAGNESIUM mg/dL 2.3 2.2 2.3     Results from last 7 days   Lab Units 11/03/23  0446 11/01/23  0507 10/31/23  0509   PHOSPHORUS mg/dL 4.1 4.5* 5.4*                      ABG:       Micro:   Results from last 7 days   Lab Units 10/28/23  2217   SPUTUM CULTURE  2+ Growth of   GRAM STAIN RESULT  No Epithelial cells seen*  2+ Disintegrating polys*  Rare Gram positive cocci in pairs*         IMAGING & DIAGNOSTIC TESTING     Radiology Results: I have personally reviewed pertinent reports. XR chest portable    Result Date: 9/18/2023  Impression: Increased lung markings seen suggest congestion. The left base is obscured No worsening Workstation performed: [id number]     CT soft tissue neck w contrast    Result Date: 9/14/2023  Impression: Large enhancing soft tissue mass identified within the left neck involving multiple spaces. This appears to be centered within the left oropharynx with extensions as described above into multiple spaces. This results in significant airway compromise. 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 Small level 3 and level 4 nodes are seen within the neck.  I personally discussed this study with ICU resident on 9/14/2023 4:44 PM. Workstation performed: [id number]     Bronchoscopy    Result Date: 9/14/2023  Impression: BAL of the right middle lobe Endotracheal tube was retracted under direct visualization Tongue still appears swollen, vocal cords visualized outside of the endotracheal tube with the bronchoscope, no significant edema or mass noted RECOMMENDATION:  Follow-up infectious work-up      XR chest 1 view portable    Result Date: 9/13/2023  Impression: 1. Endotracheal tube terminates 4 cm above the sujata. 2. Redemonstration of bibasilar airspace opacities. Workstation performed: [id number]     XR chest 1 view portable    Result Date: 9/13/2023  Impression: 1. Endotracheal tube terminates 3.3 cm above the sujata. 2. Bibasilar airspace opacities are again demonstrated. Workstation performed: [id number]     XR chest portable    Result Date: 9/13/2023  Impression: 1. Hazy bibasilar airspace opacities which may represent atelectasis versus pneumonia. Left basilar opacity is similar to recent radiograph while right basilar opacity is new Workstation performed: [id number]     CTA ED chest PE Study    Result Date: 9/13/2023  Impression: No pulmonary embolus. Patchy consolidation right middle lobe, new from 8/25/2023, compatible with pneumonia. Slight regression of previous hilar and mediastinal lymphadenopathy. Improving left upper lobe opacity which may have been due to pneumonia with residual atelectasis/scar. Persistent partial atelectasis of the lower lobes. 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 Workstation performed: [id number]     Other Diagnostic Testing: I have personally reviewed pertinent reports.     ACTIVE MEDICATIONS     Current Facility-Administered Medications   Medication Dose Route Frequency    acyclovir (ZOVIRAX) capsule 400 mg  400 mg Oral BID    acyclovir (ZOVIRAX) capsule 400 mg  400 mg Oral BID    alteplase (CATHFLO) injection 2 mg  2 mg Intracatheter Q1MIN PRN    alteplase (CATHFLO) injection 2 mg  2 mg Intracatheter Q1MIN PRN alteplase (CATHFLO) injection 2 mg  2 mg Intracatheter Q1MIN PRN    amLODIPine (NORVASC) tablet 10 mg  10 mg Oral Daily    busPIRone (BUSPAR) tablet 30 mg  30 mg Oral TID    cefepime (MAXIPIME) 2 g/50 mL dextrose IVPB  2,000 mg Intravenous Q12H    chlorhexidine (PERIDEX) 0.12 % oral rinse 15 mL  15 mL Mouth/Throat Q12H [address]    [START ON 11/7/2023] cyclophosphamide (CYTOXAN) 1,350 mg in sodium chloride 0.9 % 250 mL IVPB  750 mg/m2 (Treatment Plan Recorded) Intravenous Once    DOXOrubicin (ADRIAMYCIN) 18 mg, etoposide (TOPOSAR) 67.6 mg, vinCRIStine (ONCOVIN) 0.7 mg in sodium chloride 0.9 % 500 mL infusion   Intravenous Q24H    enoxaparin (LOVENOX) subcutaneous injection 40 mg  40 mg Subcutaneous Q24H ASHLEY    famotidine (PEPCID) tablet 20 mg  20 mg Oral BID    fluconazole (DIFLUCAN) tablet 200 mg  200 mg Oral Daily    fluconazole (DIFLUCAN) tablet 400 mg  400 mg Oral Daily    folic acid (FOLVITE) tablet 1 mg  1 mg Oral Daily    gabapentin (NEURONTIN) capsule 300 mg  300 mg Oral TID    levalbuterol (XOPENEX) inhalation solution 1.25 mg  1.25 mg Nebulization Q6H    And    ipratropium (ATROVENT) 0.02 % inhalation solution 0.5 mg  0.5 mg Nebulization Q6H    levofloxacin (LEVAQUIN) tablet 250 mg  250 mg Oral Q24H ASHLEY    levofloxacin (LEVAQUIN) tablet 500 mg  500 mg Oral Q24H [address]    melatonin tablet 3 mg  3 mg Oral HS    metoprolol tartrate (LOPRESSOR) tablet 25 mg  25 mg Oral Q12H [address]    nicotine (NICODERM CQ) 7 mg/24hr TD 24 hr patch 7 mg  7 mg Transdermal Daily    ondansetron (ZOFRAN) 16 mg, dexamethasone (DECADRON) 10 mg in sodium chloride 0.9 % 50 mL IVPB   Intravenous Q24H    ondansetron (ZOFRAN) injection 4 mg  4 mg Intravenous Q8H PRN    ondansetron (ZOFRAN) injection 4 mg  4 mg Intravenous Q8H PRN    oxyCODONE (ROXICODONE) oral solution 2.5 mg  2.5 mg Oral Q6H PRN    oxyCODONE (ROXICODONE) oral solution 5 mg  5 mg Oral Q8H    predniSONE tablet 100 mg  60 mg/m2 (Treatment Plan Recorded) Oral BID With Meals QUEtiapine (SEROquel) tablet 50 mg  50 mg Oral HS    senna oral syrup 8.8 mg  8.8 mg Per NG Tube HS    sertraline (ZOLOFT) tablet 75 mg  75 mg Per PEG Tube Daily    [START ON 11/7/2023] sodium chloride 0.9 % bolus 2,000 mL  2,000 mL Intravenous Once    sodium chloride 0.9 % infusion  20 mL/hr Intravenous Daily PRN    sulfamethoxazole-trimethoprim (BACTRIM DS) 800-160 mg per tablet 1 tablet  1 tablet Oral Once per day on Mon Wed Fri    sulfamethoxazole-trimethoprim (BACTRIM DS) 800-160 mg per tablet 1 tablet  1 tablet Oral Once per day on Mon Wed Fri    vancomycin (VANCOCIN) IVPB (premix in dextrose) 750 mg 150 mL  750 mg Intravenous Q24H       VTE Pharmacologic Prophylaxis: Enoxaparin (Lovenox)  VTE Mechanical Prophylaxis: sequential compression device      Disclaimer: Portions of the record may have been created with voice recognition software. Occasional wrong word or "sound a like" substitutions may have occurred due to the inherent limitations of voice recognition software. Careful consideration should be taken to recognize, using context, where substitutions have occurred.     Nicole Mckeon MD   Pulmonary and Critical Care Fellow, PGY-5  [facility]

## 2023-11-03 NOTE — PROGRESS NOTES
[address]  Transfer to the ICU  Name: Sigifredo Espinoza  MRN: [id number]  Unit/Bed#: ICU 03 I Date of Admission: 9/13/2023   Date of Service: 11/3/2023 I Hospital Day: 46    Assessment/Plan   * Acute respiratory failure with hypoxia secondary to oropharyngeal mass  Assessment & Plan  Cuffless trach placed 9/17, transitioned to cuffed on 10/3. Oxygen requirements not improving. For mental health patient is on buspirone, quetiapine, and sertraline. For pain, gabapentin, oxycodone scheduled and prn, prn oxycodone mainly utilized for increased pain during chemo and after a bronch. On Guaifenesin, Atrovent and Xopenex for airway maintenance. No improvement in bilateral consolidation or atelectasis and groundglass opacities on repeat CT and chest x-rays. Bronchial cx with 3 colonies CoNS. PCP PCR invalid, repeat negative. CTCAP 10/12:  indicates additional groundglass opacity with paraseptal emphysema, which may be contributing to inability to remain off vent. Off Vent since 10/23. Patient was transferred to floors 11/2, however desaturated to <50% spo2. Recent Labs     11/01/23  0507   PHVEN 7.358   [id number] 61.4*   PO2VEN 40.1   [id number] 33.8*   BEVEN 7.2         Plan:  Transferred back to ICU  Complete a 7 day course of cefepime and Vancomycin. Day 6. ID consult  Bronch will be done today. Continue trach collar during the day and at night. May need pressure support at night  Wean down HFNC to a goal FiO2 of 50% to be discharged      Large cell lymphoma [facility])  Assessment & Plan  Large B-cell lymphoma, stage II. First chemo cycle 9/22 4 days of R-EPOCH. 9/27 Rituxan. 9/28 Filgrastim.   Received nivestym 300 mc 7 days dcd on 10/26      Prophylaxis:  Bactrim prophylaxis Monday Wednesday Friday,  Acyclovir 400 mg twice daily  Fluconazole 200 mg daily  Levofloxacin 250 mg daily  Lovenox 40 mg daily    9/22  4 days of R-EPOCH.  9/27 Rituxan  10/13 for [facility] cycle 2, which was done over 4 days  cytoxan on 10/19. Plan:  Received R EPOCH day 1/4 of cycle 3 today  Oncology is following  Transfuse blood products as needed. Keep Hgb>7 and Plt>20 for chemo   Continue rituximab infusions as per heme/onc plan. See acute respiratory failure above    Angioedema  Assessment & Plan  POA in [place] ED: Swollen tongue, soft and hard palate with severe angioedema. Decadron 10 mg, Benadryl 25 mg given. 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 Plan:  Cuffless trach 9/17, cuffed Shiley XLT #7 10/3  See acute respiratory failure and oropharyngeal mass above  Continue to wean vent. Pancytopenia due to chemotherapy (HCC)-resolved as of 11/3/2023  Assessment & Plan  Patient received 1 PRBC transfusion on 10/5. Hemoglobin 8.2 s/p 1 PRBC transfusion on 10/25. B/L is 12-14. No sites of bleeding, no black stools. Iron panel indicative of inflammatory anemia. Normal Vitamin B12 levels, negative hemolysis smear. Folate is low 5.5. Plan:  Trend hemoglobin and transfuse for <7. As per heme/onc transfuse irradiated and leukoreduced blood products. Trend platelets  Folic acid 1mg daily supplementation  As per my conversation with Heme/oncology attending, patient is expected to have chemotherapy associated pancytopenia. No further anemia w/u required. Depression  Assessment & Plan  Patient on Zoloft 75 daily and Seroquel hs  Patient is depressed, multiple family/palliative discussions. patient is firm that she wants to live and to fight, she is just tired. Currently goal is disease treatment oriented. Full code. No SI/HI ideations. Palliative signed off, will reconsult if patient changes her mind regarding wishes. Generalized abdominal pain  Assessment & Plan  Patient reports abdominal pain which is unchanged since 9/22 no guarding on exam. Takes Famotidine for GERD at home. Alk phos 10/2 145, 10/20 79.  CTCAP reveals complex right upper pole renal lesion requiring possible outpatient MRI    Continue Famotidine  On bowel regimen with Senna and Miralax prn    Chronic Superficial thrombophlebitis  Assessment & Plan  Right forearm soreness. RUE US: No acute or chronic deep vein thrombosis. 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 PO not severe enough to require renal dosing at this time, will continue to monitor and adjust dosing as needed. Continue DVT ppx with Lovenox 40    Blister (nonthermal) of left forearm, sequela  Assessment & Plan  Blisters of left upper extremity healing, no signs of infection, continue daily wound care. Oropharyngeal mass  Assessment & Plan  9/14 Neck/ soft tissue CT: Large enhancing soft tissue mass identified within the left neck involving multiple spaces. Centered within the left oropharynx with extensions as described above into multiple spaces. This results in significant airway compromise. 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 Small level 3/level 4 nodes are seen within the neck. Tracheostomy by ENT. Replaced on 9/26. H/o tobacco abuse, daily smoker. On nicotine while inpatient, confirmed with patient she needs nicotine patch. CT soft tissue neck shows reduced mass effect from 9/14 to 10/13. Can consider reducing trach size if continues to improve    Plan:  ENT and heme onc following  As per speech therapist diet  Dysphagia 2. Continue parallel PEG tube feeds for nutrition. See acute respiratory failure and large B cell lymphoma above. Ambulatory dysfunction  Assessment & Plan  2/2 Impacted by chronic pain syndrome + prolonged hospital stay. Continue OOB with PT. PT rec post acute rehab however reportedly Cannot get LTACH placement while getting chemo per CM  She is aware STR SNFs continue to follow and treatment can be done from a SNF level of care. PT would need to be on trach collar for at least 72 hours with no need for the vent.  Aware that pt would need to be around 28% FiO2     (HFpEF) heart failure with preserved ejection fraction [facility])  Assessment & Plan  Wt Readings from Last 3 Encounters:   11/03/23 77.1 kg (169 lb 15.6 oz)   09/07/23 74.6 kg (164 lb 6.4 oz)   08/30/23 73.3 kg (161 lb 9.6 oz)     Lab Results   Component Value Date    LVEF 60 08/28/2023     (H) 10/28/2023     (H) 09/29/2023     Echo 8/28/23: LVEF 60%. Plan:  K > 4   Measure I/O      CKD (chronic kidney disease) stage 3, GFR 30-59 ml/min [facility])  Assessment & Plan  Lab Results   Component Value Date    EGFR 38 11/03/2023    EGFR 42 11/02/2023    EGFR 46 11/01/2023    CREATININE 1.38 (H) 11/03/2023    CREATININE 1.27 11/02/2023    CREATININE 1.18 11/01/2023     Baseline Cr between 0.75-1.1  Initial PO felt 2/2 prerenal azotemia 2/2 diuresis, reduced PO intake +/- ATN. Patient received 2 doses of Bumex IV 2 g as she had not been responding appropriately to IV 40 Lasix daily. Creatinine went up by 0.5 meeting criteria for an PO. This may be prerenal intrinsic or postrenal.  Potential prerenal causes include reduced kidney perfusion due to lisinopril. Intrinsic can also be lisinopril due to ATN, AIN from chemo medications, TLS, crystaline nephropathy from acyclovir, rituxan nephrotoxicity, filgrastim glomerulonephritis. Post renal obstructive could be from urine retention from vincristine or cyclophosphamide bladder fibrosis. Suspect most likely due to medications as a combination of prerenal and intrinsic. FENa was 0.2%, UA shows no casts or signs of infection, urine eosinophils 0%. Patient likely has prerenal PO from volume depletion. Received 2 L NS and 1 pRBC and cr went up by 0.07 still and Na and Cl went down, suspect that volume prevented further cr rise and free water excretion impaired by kidney function, allowing hyponatremia to increase. Creatinine increase is likely plateaued and went down the following day.  Suspect that now that nephrotoxic medications have been stopped she responded well to Lasix and creatinine downtrended. HERIBERTO now resolved. Will be cautious about nephrotoxins and monitor as restarting EPOCH and ppx. Patient gets volume depleted and overloaded very easily. Especially from chemo. 10/19 Heriberto as cr went up by 0.3 in 48 hours from 0.82 on 10/17 to 1.22 on 10/19. Suspect this is prerenal hypovolemic, will see if patient improves with fluids. She gets overloaded easily so if no improvement suspect CRS again. 10/29 HERIBERTO cr went up by 0.3 again. She has 922 ml UOP in last 24 hours. HERIBERTO likely from lasix 40mg IV given yesterday. She is not hypovolemic. Plan:   Continue to monitor UO/renal function. Avoid nephrotoxic agents  Continue to monitor a.m. BMP. Pain syndrome, chronic  Assessment & Plan  Home regimen: Oxycodone 5 mg BID, Tylenol 650 mg q8 prn. Gabapentin 600 mg TID. Medical marijuana. Lumbar radiculopathy and impaired ambulatory dysfunction secondary to pain. Has bowel regimen and is having bowel movements daily. Patient required additional pain management during previous cycle and has some additional pain from tunneled line placement    Plan:  Continue gabapentin 300 mg TID, oxycodone 5 mg TID, prn Tylenol 650 mg q6. Oxycodone 5mg every 8 hours  Oxycodone 2.5 mg q6 PRN    Benign essential hypertension  Assessment & Plan  Home regimen Coreg 12.5 mg BID, Amlodipine 10 mg daily, and lisinopril 40 mg. All discontinued at this time secondary to hypotension and HERIBERTO since cycle 1. but stable currently without any antihypertensives. Systolic persistently elevated and tachycardic, potentially secondary to pain. Patient was more hypotensive during last chemo. Coreg contraindicated during chemo, since cycles are every other week, would be better to stick with lopressor during duration of therapy as opposed to switching constantly. Plan:  Monitor vitals.   Continue Norvasc 10 and lopressor 25 bid  Prn hydralazine for SBP > 160             ICU Core Measures     Vented Patient  VAP Bundle  VAP bundle ordered     A: Assess, Prevent, and Manage Pain Has pain been assessed? Yes  Need for changes to pain regimen? No   B: Both Spontaneous Awakening Trials (SATs) and Spontaneous Breathing Trials (SBTs) Plan to perform spontaneous awakening trial today? N/A      C: Choice of Sedation RASS Goal: 0 Alert and Calm or N/A patient not on sedation  Need for changes to sedation or analgesia regimen? No   D: Delirium CAM-ICU: Negative   E: Early Mobility  Plan for early mobility? Yes   F: Family Engagement Plan for family engagement today? Yes       Antibiotic Review: Continue broad spectrum secondary to severity of illness. Review of Invasive Devices:      Central access plan: Medications requiring central line      Prophylaxis:  VTE VTE covered by:  enoxaparin, Subcutaneous, 40 mg at 11/03/23 4499       Stress Ulcer  covered byfamotidine (PEPCID) tablet 20 mg [[id number]]       Subjective   HPI: Patient is a 78 yo female was moved to [facility] yesterday, however she desaturated in the wards despite high flow nasal cannula. No fevers noted. Will move back to the ICU for further management    24hr events: Desaturation    Review of Systems   Constitutional:  Negative for chills and fever. HENT:  Negative for ear pain and sore throat. Eyes:  Negative for pain and visual disturbance. Respiratory:  Positive for shortness of breath. Negative for cough. Cardiovascular:  Negative for chest pain and palpitations. Gastrointestinal:  Negative for abdominal pain and vomiting. Genitourinary:  Negative for dysuria and hematuria. Musculoskeletal:  Negative for arthralgias and back pain. Skin:  Negative for color change and rash. Neurological:  Negative for seizures and syncope. All other systems reviewed and are negative.        Objective                            Vitals I/O      Most Recent Min/Max in 24hrs   Temp 99.3 °F (37.4 °C) Temp  Min: 98.9 °F (37.2 °C)  Max: 99.3 °F (37.4 °C)   Pulse 95 Pulse  Min: 95 Max: 95   Resp 21 Resp  Min: 21  Max: 21   /55 BP  Min: 109/55  Max: 109/55   O2 Sat (!) 83 % (RT notified to make titration) SpO2  Min: 83 %  Max: 95 %      Intake/Output Summary (Last 24 hours) at 11/3/2023 1513  Last data filed at 11/3/2023 0226  Gross per 24 hour   Intake 961 ml   Output 400 ml   Net 561 ml         Diet Enteral/Parenteral; Tube Feeding with Oral Diet; Two Telly HN; Bolus; 228; (4x/day) - 8:00AM,12:00PM,4:00PM,8:00PM; 95; Water; Before and After each Bolus; Dysphagia/Modified Consistency; Dysphagia 2-Mechanical Soft; Honey Thick Liquid; Honey T... Invasive Monitoring Physical exam    Physical Exam  Constitutional:       Appearance: She is ill-appearing.       Comments: Patient did not allow me to do a physical exam on her today            Diagnostic Studies      EKG: NSR  Imaging: no      Medications:  Scheduled PRN   acyclovir, 400 mg, BID  acyclovir, 400 mg, BID  amLODIPine, 10 mg, Daily  budesonide, 0.5 mg, Q12H  busPIRone, 30 mg, TID  cefepime, 2,000 mg, Q12H  chlorhexidine, 15 mL, Q12H [address]  [START ON 11/7/2023] cyclophosphamide (CYTOXAN) 1,350 mg in sodium chloride 0.9 % 250 mL IVPB, 750 mg/m2 (Treatment Plan Recorded), Once  DOXOrubicin (ADRIAMYCIN) 18 mg, etoposide (TOPOSAR) 67.6 mg, vinCRIStine (ONCOVIN) 0.7 mg in sodium chloride 0.9 % 500 mL infusion, , Q24H  enoxaparin, 40 mg, Q24H ASHLEY  famotidine, 20 mg, BID  fluconazole, 200 mg, Daily  fluconazole, 328 mg, Daily  folic acid, 1 mg, Daily  formoterol, 20 mcg, Q12H  gabapentin, 300 mg, TID  levalbuterol, 1.25 mg, Q6H   And  ipratropium, 0.5 mg, Q6H  levofloxacin, 250 mg, Q24H ASHELY  levofloxacin, 500 mg, Q24H [address]  melatonin, 3 mg, HS  metoprolol tartrate, 25 mg, Q12H [address]  nicotine, 7 mg, Daily  ondansetron (ZOFRAN) 16 mg, dexamethasone (DECADRON) 10 mg in sodium chloride 0.9 % 50 mL IVPB, , Q24H  oxyCODONE, 5 mg, Q8H  predniSONE, 60 mg/m2 (Treatment Plan Recorded), BID With Meals  QUEtiapine, 50 mg, HS  senna, 8.8 mg, HS  sertraline, 75 mg, Daily  [START ON 11/7/2023] sodium chloride, 2,000 mL, Once  sulfamethoxazole-trimethoprim, 1 tablet, Once per day on Mon Wed Fri  sulfamethoxazole-trimethoprim, 1 tablet, Once per day on Mon Wed Fri  vancomycin, 750 mg, Q24H      alteplase, 2 mg, Q1MIN PRN  alteplase, 2 mg, Q1MIN PRN  alteplase, 2 mg, Q1MIN PRN  ondansetron, 4 mg, Q8H PRN  ondansetron, 4 mg, Q8H PRN  oxyCODONE, 2.5 mg, Q6H PRN  sodium chloride, 20 mL/hr, Daily PRN       Continuous          Labs:    CBC    Recent Labs     11/02/23 0430 11/03/23 0446   WBC 13.13* 15.68*   HGB 7.2* 7.2*   HCT 24.0* 23.5*    222   BANDSPCT  --  5     BMP    Recent Labs     11/02/23 0430 11/03/23 0446 11/03/23  1310   SODIUM 136 136  --    K 5.3 6.0* 6.2*    103  --    CO2 32 31  --    AGAP 3 2  --    BUN 36* 44*  --    CREATININE 1.27 1.38*  --    CALCIUM 9.8 9.8 10.0       Coags    No recent results     Additional Electrolytes  Recent Labs     11/03/23 0446   MG 2.3   PHOS 4.1          Blood Gas    No recent results  No recent results   LFTs  Recent Labs     11/03/23 0446   ALT 19   AST 14   ALKPHOS 85   ALB 3.0*   TBILI 0.20       Infectious  No recent results  Glucose  Recent Labs     11/02/23 0430 11/03/23 0446   GLUC 92 96           CC time per attending  Opal Nichole MD

## 2023-11-03 NOTE — ASSESSMENT & PLAN NOTE
Right forearm soreness. RUE US: No acute or chronic deep vein thrombosis. 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 Will continue to monitor and adjust dosing as needed.       Continue DVT ppx with Lovenox 40

## 2023-11-03 NOTE — ASSESSMENT & PLAN NOTE
POA in [facility] ED: Swollen tongue, soft and hard palate with severe angioedema. Decadron 10 mg, Benadryl 25 mg given. 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 Plan:  Cuffless trach 9/17, cuffed Shiley XLT #7 10/3  See acute respiratory failure and oropharyngeal mass above  Continue to wean vent.

## 2023-11-03 NOTE — ASSESSMENT & PLAN NOTE
Large B-cell lymphoma, stage II. First chemo cycle 9/22 4 days of R-EPOCH. 9/27 Rituxan. 9/28 Filgrastim. Received nivestym 300 mc 7 days dcd on 10/26      Prophylaxis:  Bactrim prophylaxis Monday Wednesday Friday,  Acyclovir 400 mg twice daily  Fluconazole 200 mg daily  Levofloxacin 250 mg daily  Lovenox 40 mg daily    9/22  4 days of R-EPOCH.  9/27 Rituxan  10/13 for [facility] cycle 2, which was done over 4 days  cytoxan on 10/19. Plan:  Received R EPOCH day 1/4 of cycle 3 today  Oncology is following  Transfuse blood products as needed.   Keep Hgb>7 and Plt>20 for chemo   See acute respiratory failure above

## 2023-11-03 NOTE — ASSESSMENT & PLAN NOTE
POA in [place] ([place] ED: Swollen tongue, soft and hard palate with severe angioedema. Decadron 10 mg, Benadryl 25 mg given. 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 Plan:  Cuffless trach 9/17, cuffed Shiley XLT #7 10/3  See acute respiratory failure and oropharyngeal mass above  Continue to wean vent.

## 2023-11-03 NOTE — PROGRESS NOTES
Regis Jaeger is a 77 y.o. female who is currently ordered Vancomycin IV with management by the Pharmacy Consult service. Relevant clinical data and objective / subjective history reviewed. Vancomycin Assessment:  Indication and Goal AUC/Trough: Pneumonia (goal -600, trough >10), -600, trough >10  Clinical Status:  critical care  Micro:     Renal Function:  SCr: 1.38 mg/dL  CrCl: 35.3 mL/min  Renal replacement: Not on dialysis  Days of Therapy: 6  Current Dose: 1g IV q24 hours  Vancomycin Plan:  New Dosinmg IV q24 hours  Estimated AUC: 438 mcg*hr/mL  Estimated Trough: 13.7 mcg/mL  Next Level: 23 @ 0600  Renal Function Monitoring: Daily BMP and [place] will continue to follow closely for s/sx of nephrotoxicity, infusion reactions and appropriateness of therapy. BMP and CBC will be ordered per protocol. We will continue to follow the patient’s culture results and clinical progress daily.     Julio Carrera, Pharmacist

## 2023-11-03 NOTE — ASSESSMENT & PLAN NOTE
Patient reports abdominal pain which is unchanged since 9/22 no guarding on exam. Takes Famotidine for GERD at home. Alk phos 10/2 145, 10/20 79.  CTCAP reveals complex right upper pole renal lesion requiring possible outpatient MRI    Continue Famotidine  On bowel regimen with Senna

## 2023-11-03 NOTE — ASSESSMENT & PLAN NOTE
Lab Results   Component Value Date    EGFR 38 11/03/2023    EGFR 42 11/02/2023    EGFR 46 11/01/2023    CREATININE 1.38 (H) 11/03/2023    CREATININE 1.27 11/02/2023    CREATININE 1.18 11/01/2023     Baseline Cr between 0.75-1.1  Initial HERIBERTO felt 2/2 prerenal azotemia 2/2 diuresis, reduced PO intake +/- ATN. Patient received 2 doses of Bumex IV 2 g as she had not been responding appropriately to IV 40 Lasix daily. Creatinine went up by 0.5 meeting criteria for an HERIBERTO. This may be prerenal intrinsic or postrenal.  Potential prerenal causes include reduced kidney perfusion due to lisinopril. Intrinsic can also be lisinopril due to ATN, AIN from chemo medications, TLS, crystaline nephropathy from acyclovir, rituxan nephrotoxicity, filgrastim glomerulonephritis. Post renal obstructive could be from urine retention from vincristine or cyclophosphamide bladder fibrosis. Suspect most likely due to medications as a combination of prerenal and intrinsic. FENa was 0.2%, UA shows no casts or signs of infection, urine eosinophils 0%. Patient likely has prerenal HERIBERTO from volume depletion. Received 2 L NS and 1 pRBC and cr went up by 0.07 still and Na and Cl went down, suspect that volume prevented further cr rise and free water excretion impaired by kidney function, allowing hyponatremia to increase. Creatinine increase is likely plateaued and went down the following day. Suspect that now that nephrotoxic medications have been stopped she responded well to Lasix and creatinine downtrended. HERIBERTO now resolved. Will be cautious about nephrotoxins and monitor as restarting EPOCH and ppx. Patient gets volume depleted and overloaded very easily. Especially from chemo. 10/19 Heriberto as cr went up by 0.3 in 48 hours from 0.82 on 10/17 to 1.22 on 10/19. Suspect this is prerenal hypovolemic, will see if patient improves with fluids. She gets overloaded easily so if no improvement suspect CRS again. 10/29 HERIBERTO cr went up by 0.3 again. She has 922 ml UOP in last 24 hours. PO likely from lasix 40mg IV given yesterday. She is not hypovolemic. 11/3 Cr increased by 0.11    Plan:   Continue to monitor UO/renal function. Avoid nephrotoxic agents  Continue to monitor a.m. BMP.

## 2023-11-03 NOTE — ASSESSMENT & PLAN NOTE
Lab Results   Component Value Date    EGFR 38 11/03/2023    EGFR 42 11/02/2023    EGFR 46 11/01/2023    CREATININE 1.38 (H) 11/03/2023    CREATININE 1.27 11/02/2023    CREATININE 1.18 11/01/2023     Baseline Cr between 0.75-1.1  Initial HERIBERTO felt 2/2 prerenal azotemia 2/2 diuresis, reduced PO intake +/- ATN. Patient received 2 doses of Bumex IV 2 g as she had not been responding appropriately to IV 40 Lasix daily. Creatinine went up by 0.5 meeting criteria for an HERIBERTO. This may be prerenal intrinsic or postrenal.  Potential prerenal causes include reduced kidney perfusion due to lisinopril. Intrinsic can also be lisinopril due to ATN, AIN from chemo medications, TLS, crystaline nephropathy from acyclovir, rituxan nephrotoxicity, filgrastim glomerulonephritis. Post renal obstructive could be from urine retention from vincristine or cyclophosphamide bladder fibrosis. Suspect most likely due to medications as a combination of prerenal and intrinsic. FENa was 0.2%, UA shows no casts or signs of infection, urine eosinophils 0%. Patient likely has prerenal HERIBERTO from volume depletion. Received 2 L NS and 1 pRBC and cr went up by 0.07 still and Na and Cl went down, suspect that volume prevented further cr rise and free water excretion impaired by kidney function, allowing hyponatremia to increase. Creatinine increase is likely plateaued and went down the following day. Suspect that now that nephrotoxic medications have been stopped she responded well to Lasix and creatinine downtrended. HERIBERTO now resolved. Will be cautious about nephrotoxins and monitor as restarting EPOCH and ppx. Patient gets volume depleted and overloaded very easily. Especially from chemo. 10/19 Heriberto as cr went up by 0.3 in 48 hours from 0.82 on 10/17 to 1.22 on 10/19. Suspect this is prerenal hypovolemic, will see if patient improves with fluids. She gets overloaded easily so if no improvement suspect CRS again. 10/29 HERIBERTO cr went up by 0.3 again. She has 922 ml UOP in last 24 hours. PO likely from lasix 40mg IV given yesterday. She is not hypovolemic. Plan:   Continue to monitor UO/renal function. Avoid nephrotoxic agents  Continue to monitor a.m. BMP.

## 2023-11-03 NOTE — ASSESSMENT & PLAN NOTE
Wt Readings from Last 3 Encounters:   11/03/23 77.1 kg (169 lb 15.6 oz)   09/07/23 74.6 kg (164 lb 6.4 oz)   08/30/23 73.3 kg (161 lb 9.6 oz)     Lab Results   Component Value Date    LVEF 60 08/28/2023     (H) 10/28/2023     (H) 09/29/2023     Echo 8/28/23: LVEF 60%.        Plan:  K > 4   Measure I/O

## 2023-11-03 NOTE — ASSESSMENT & PLAN NOTE
Wt Readings from Last 3 Encounters:   11/03/23 77.1 kg (169 lb 15.6 oz)   09/07/23 74.6 kg (164 lb 6.4 oz)   08/30/23 73.3 kg (161 lb 9.6 oz)     Lab Results   Component Value Date    LVEF 60 08/28/2023     (H) 10/28/2023     (H) 09/29/2023     Echo 8/28/23: LVEF 60%.        Plan:  Maintain K > 4;  Na 136  Measure I/O  Daily weights

## 2023-11-03 NOTE — ASSESSMENT & PLAN NOTE
Cuffless trach placed 9/17, transitioned to cuffed on 10/3. Oxygen requirements not improving. For mental health patient is on buspirone, quetiapine, and sertraline. For pain, gabapentin, oxycodone scheduled and prn, prn oxycodone mainly utilized for increased pain during chemo and after a bronch. On Guaifenesin, Atrovent and Xopenex for airway maintenance. No improvement in bilateral consolidation or atelectasis and groundglass opacities on repeat CT and chest x-rays. Bronchial cx with 3 colonies CoNS. PCP PCR invalid, repeat negative. CTCAP 10/12:  indicates additional groundglass opacity with paraseptal emphysema, which may be contributing to inability to remain off vent. Off Vent since 10/23. Patient was transferred to floors 11/2, however desaturated to <50% spo2. Recent Labs     11/01/23  0507   PHVEN 7.358   LTV0UFK 61.4*   PO2VEN 40.1   [id number] 33.8*   BEVEN 7.2       Cuffless trach 9/17, cuffed Shiley XLT #7 10/3  Plan:  Complete a 7 day course of cefepime and Vancomycin. Dc today  ID consult              -Check RP 2, Fungitell, urine histoplasma antigen, Aspergillus galactomannan antigen and antibody, CMV PCR  May need bronch, however trach currently cuffless. Considering Chemo induced pneumonitis. Will discuss with heme onc. Patient on 100 prednisone BID  Continue trach collar during the day and at night.   May need pressure support at night  Wean down HFNC to a goal FiO2 of 50% to be discharged

## 2023-11-04 PROBLEM — T78.3XXA ANGIOEDEMA: Chronic | Status: RESOLVED | Noted: 2023-09-13 | Resolved: 2023-11-04

## 2023-11-04 LAB
ALBUMIN SERPL BCP-MCNC: 3.1 G/DL (ref 3.5–5)
ALP SERPL-CCNC: 89 U/L (ref 34–104)
ALT SERPL W P-5'-P-CCNC: 19 U/L (ref 7–52)
ANION GAP SERPL CALCULATED.3IONS-SCNC: 3 MMOL/L
ANISOCYTOSIS BLD QL SMEAR: PRESENT
AST SERPL W P-5'-P-CCNC: 15 U/L (ref 13–39)
B PARAP IS1001 DNA NPH QL NAA+NON-PROBE: NOT DETECTED
B PERT.PT PRMT NPH QL NAA+NON-PROBE: NOT DETECTED
BASOPHILS # BLD MANUAL: 0 THOUSAND/UL (ref 0–0.1)
BASOPHILS NFR MAR MANUAL: 0 % (ref 0–1)
BILIRUB SERPL-MCNC: 0.19 MG/DL (ref 0.2–1)
BUN SERPL-MCNC: 57 MG/DL (ref 5–25)
C PNEUM DNA NPH QL NAA+NON-PROBE: NOT DETECTED
CA-I BLD-SCNC: 1.34 MMOL/L (ref 1.12–1.32)
CALCIUM ALBUM COR SERPL-MCNC: 10.6 MG/DL (ref 8.3–10.1)
CALCIUM SERPL-MCNC: 9.9 MG/DL (ref 8.4–10.2)
CHLORIDE SERPL-SCNC: 105 MMOL/L (ref 96–108)
CO2 SERPL-SCNC: 29 MMOL/L (ref 21–32)
CREAT SERPL-MCNC: 1.4 MG/DL (ref 0.6–1.3)
EOSINOPHIL # BLD MANUAL: 0 THOUSAND/UL (ref 0–0.4)
EOSINOPHIL NFR BLD MANUAL: 0 % (ref 0–6)
ERYTHROCYTE [DISTWIDTH] IN BLOOD BY AUTOMATED COUNT: 16.3 % (ref 11.6–15.1)
FLUAV RNA NPH QL NAA+NON-PROBE: NOT DETECTED
FLUBV RNA NPH QL NAA+NON-PROBE: NOT DETECTED
GFR SERPL CREATININE-BSD FRML MDRD: 38 ML/MIN/1.73SQ M
GLUCOSE SERPL-MCNC: 143 MG/DL (ref 65–140)
GLUCOSE SERPL-MCNC: 173 MG/DL (ref 65–140)
GLUCOSE SERPL-MCNC: 187 MG/DL (ref 65–140)
HADV DNA NPH QL NAA+NON-PROBE: NOT DETECTED
HCOV 229E RNA NPH QL NAA+NON-PROBE: NOT DETECTED
HCOV HKU1 RNA NPH QL NAA+NON-PROBE: NOT DETECTED
HCOV NL63 RNA NPH QL NAA+NON-PROBE: NOT DETECTED
HCOV OC43 RNA NPH QL NAA+NON-PROBE: NOT DETECTED
HCT VFR BLD AUTO: 25.3 % (ref 34.8–46.1)
HGB BLD-MCNC: 8 G/DL (ref 11.5–15.4)
HMPV RNA NPH QL NAA+NON-PROBE: NOT DETECTED
HPIV1 RNA NPH QL NAA+NON-PROBE: NOT DETECTED
HPIV2 RNA NPH QL NAA+NON-PROBE: NOT DETECTED
HPIV3 RNA NPH QL NAA+NON-PROBE: NOT DETECTED
HPIV4 RNA NPH QL NAA+NON-PROBE: NOT DETECTED
LDH SERPL-CCNC: 206 U/L (ref 140–271)
LYMPHOCYTES # BLD AUTO: 0.23 THOUSAND/UL (ref 0.6–4.47)
LYMPHOCYTES # BLD AUTO: 1 % (ref 14–44)
M PNEUMO DNA NPH QL NAA+NON-PROBE: NOT DETECTED
MACROCYTES BLD QL AUTO: PRESENT
MAGNESIUM SERPL-MCNC: 2.1 MG/DL (ref 1.9–2.7)
MCH RBC QN AUTO: 31.6 PG (ref 26.8–34.3)
MCHC RBC AUTO-ENTMCNC: 31.6 G/DL (ref 31.4–37.4)
MCV RBC AUTO: 100 FL (ref 82–98)
METAMYELOCYTES NFR BLD MANUAL: 1 % (ref 0–1)
MONOCYTES # BLD AUTO: 1.83 THOUSAND/UL (ref 0–1.22)
MONOCYTES NFR BLD: 8 % (ref 4–12)
MYELOCYTES NFR BLD MANUAL: 1 % (ref 0–1)
NEUTROPHILS # BLD MANUAL: 20.34 THOUSAND/UL (ref 1.85–7.62)
NEUTS BAND NFR BLD MANUAL: 6 % (ref 0–8)
NEUTS SEG NFR BLD AUTO: 83 % (ref 43–75)
PHOSPHATE SERPL-MCNC: 1.7 MG/DL (ref 2.3–4.1)
PLATELET # BLD AUTO: 357 THOUSANDS/UL (ref 149–390)
PLATELET BLD QL SMEAR: ADEQUATE
PMV BLD AUTO: 9.9 FL (ref 8.9–12.7)
POTASSIUM SERPL-SCNC: 5.3 MMOL/L (ref 3.5–5.3)
POTASSIUM SERPL-SCNC: 5.9 MMOL/L (ref 3.5–5.3)
POTASSIUM SERPL-SCNC: 6.6 MMOL/L (ref 3.5–5.3)
PROT SERPL-MCNC: 6.3 G/DL (ref 6.4–8.4)
RBC # BLD AUTO: 2.53 MILLION/UL (ref 3.81–5.12)
RBC MORPH BLD: PRESENT
RSV RNA NPH QL NAA+NON-PROBE: NOT DETECTED
RV+EV RNA NPH QL NAA+NON-PROBE: NOT DETECTED
SARS-COV-2 RNA NPH QL NAA+NON-PROBE: NOT DETECTED
SODIUM SERPL-SCNC: 137 MMOL/L (ref 135–147)
TOXIC GRANULES BLD QL SMEAR: PRESENT
URATE SERPL-MCNC: 3.6 MG/DL (ref 2–7.5)
WBC # BLD AUTO: 22.85 THOUSAND/UL (ref 4.31–10.16)

## 2023-11-04 PROCEDURE — 99291 CRITICAL CARE FIRST HOUR: CPT | Performed by: INTERNAL MEDICINE

## 2023-11-04 PROCEDURE — 83615 LACTATE (LD) (LDH) ENZYME: CPT | Performed by: INTERNAL MEDICINE

## 2023-11-04 PROCEDURE — 94640 AIRWAY INHALATION TREATMENT: CPT

## 2023-11-04 PROCEDURE — 83735 ASSAY OF MAGNESIUM: CPT | Performed by: NURSE PRACTITIONER

## 2023-11-04 PROCEDURE — 85027 COMPLETE CBC AUTOMATED: CPT | Performed by: NURSE PRACTITIONER

## 2023-11-04 PROCEDURE — 84550 ASSAY OF BLOOD/URIC ACID: CPT | Performed by: INTERNAL MEDICINE

## 2023-11-04 PROCEDURE — 86606 ASPERGILLUS ANTIBODY: CPT | Performed by: STUDENT IN AN ORGANIZED HEALTH CARE EDUCATION/TRAINING PROGRAM

## 2023-11-04 PROCEDURE — 84100 ASSAY OF PHOSPHORUS: CPT | Performed by: INTERNAL MEDICINE

## 2023-11-04 PROCEDURE — 94760 N-INVAS EAR/PLS OXIMETRY 1: CPT

## 2023-11-04 PROCEDURE — 80053 COMPREHEN METABOLIC PANEL: CPT | Performed by: NURSE PRACTITIONER

## 2023-11-04 PROCEDURE — 93005 ELECTROCARDIOGRAM TRACING: CPT

## 2023-11-04 PROCEDURE — 82330 ASSAY OF CALCIUM: CPT | Performed by: INTERNAL MEDICINE

## 2023-11-04 PROCEDURE — 87449 NOS EACH ORGANISM AG IA: CPT | Performed by: STUDENT IN AN ORGANIZED HEALTH CARE EDUCATION/TRAINING PROGRAM

## 2023-11-04 PROCEDURE — 82948 REAGENT STRIP/BLOOD GLUCOSE: CPT

## 2023-11-04 PROCEDURE — 85007 BL SMEAR W/DIFF WBC COUNT: CPT | Performed by: NURSE PRACTITIONER

## 2023-11-04 PROCEDURE — 87305 ASPERGILLUS AG IA: CPT | Performed by: STUDENT IN AN ORGANIZED HEALTH CARE EDUCATION/TRAINING PROGRAM

## 2023-11-04 PROCEDURE — 84132 ASSAY OF SERUM POTASSIUM: CPT

## 2023-11-04 PROCEDURE — 87385 HISTOPLASMA CAPSUL AG IA: CPT | Performed by: STUDENT IN AN ORGANIZED HEALTH CARE EDUCATION/TRAINING PROGRAM

## 2023-11-04 RX ORDER — FUROSEMIDE 10 MG/ML
40 INJECTION INTRAMUSCULAR; INTRAVENOUS ONCE
Status: COMPLETED | OUTPATIENT
Start: 2023-11-04 | End: 2023-11-04

## 2023-11-04 RX ORDER — CALCIUM GLUCONATE 20 MG/ML
1 INJECTION, SOLUTION INTRAVENOUS ONCE
Status: COMPLETED | OUTPATIENT
Start: 2023-11-04 | End: 2023-11-05

## 2023-11-04 RX ORDER — DEXTROSE MONOHYDRATE 25 G/50ML
25 INJECTION, SOLUTION INTRAVENOUS ONCE
Status: COMPLETED | OUTPATIENT
Start: 2023-11-04 | End: 2023-11-04

## 2023-11-04 RX ADMIN — PREDNISONE 100 MG: 50 TABLET ORAL at 08:50

## 2023-11-04 RX ADMIN — IPRATROPIUM BROMIDE 0.5 MG: 0.5 SOLUTION RESPIRATORY (INHALATION) at 02:18

## 2023-11-04 RX ADMIN — CHLORHEXIDINE GLUCONATE 15 ML: 1.2 SOLUTION ORAL at 09:49

## 2023-11-04 RX ADMIN — BUDESONIDE 0.5 MG: 0.5 INHALANT ORAL at 08:53

## 2023-11-04 RX ADMIN — IPRATROPIUM BROMIDE 0.5 MG: 0.5 SOLUTION RESPIRATORY (INHALATION) at 19:24

## 2023-11-04 RX ADMIN — CEFEPIME 2000 MG: 2 INJECTION, POWDER, FOR SOLUTION INTRAVENOUS at 13:59

## 2023-11-04 RX ADMIN — FOLIC ACID 1 MG: 1 TABLET ORAL at 09:50

## 2023-11-04 RX ADMIN — METOPROLOL TARTRATE 25 MG: 25 TABLET, FILM COATED ORAL at 09:51

## 2023-11-04 RX ADMIN — ETOPOSIDE: 20 INJECTION, SOLUTION INTRAVENOUS at 10:47

## 2023-11-04 RX ADMIN — FUROSEMIDE 40 MG: 10 INJECTION, SOLUTION INTRAMUSCULAR; INTRAVENOUS at 07:39

## 2023-11-04 RX ADMIN — BUSPIRONE HYDROCHLORIDE 30 MG: 10 TABLET ORAL at 17:36

## 2023-11-04 RX ADMIN — Medication 8.8 MG: at 21:09

## 2023-11-04 RX ADMIN — ACYCLOVIR 400 MG: 200 CAPSULE ORAL at 17:43

## 2023-11-04 RX ADMIN — ALBUTEROL SULFATE 10 MG: 2.5 SOLUTION RESPIRATORY (INHALATION) at 08:53

## 2023-11-04 RX ADMIN — PREDNISONE 100 MG: 50 TABLET ORAL at 17:36

## 2023-11-04 RX ADMIN — BUDESONIDE 0.5 MG: 0.5 INHALANT ORAL at 19:24

## 2023-11-04 RX ADMIN — FLUCONAZOLE 400 MG: 100 TABLET ORAL at 09:50

## 2023-11-04 RX ADMIN — LEVALBUTEROL HYDROCHLORIDE 1.25 MG: 1.25 SOLUTION RESPIRATORY (INHALATION) at 02:18

## 2023-11-04 RX ADMIN — LEVALBUTEROL HYDROCHLORIDE 1.25 MG: 1.25 SOLUTION RESPIRATORY (INHALATION) at 14:18

## 2023-11-04 RX ADMIN — BUSPIRONE HYDROCHLORIDE 30 MG: 10 TABLET ORAL at 10:00

## 2023-11-04 RX ADMIN — SODIUM PHOSPHATE, MONOBASIC, MONOHYDRATE AND SODIUM PHOSPHATE, DIBASIC, ANHYDROUS 21 MMOL: 142; 276 INJECTION, SOLUTION INTRAVENOUS at 09:08

## 2023-11-04 RX ADMIN — CHLORHEXIDINE GLUCONATE 15 ML: 1.2 SOLUTION ORAL at 21:08

## 2023-11-04 RX ADMIN — CEFEPIME 2000 MG: 2 INJECTION, POWDER, FOR SOLUTION INTRAVENOUS at 00:00

## 2023-11-04 RX ADMIN — MELATONIN 3 MG: at 21:09

## 2023-11-04 RX ADMIN — LEVOFLOXACIN 500 MG: 250 TABLET, FILM COATED ORAL at 09:50

## 2023-11-04 RX ADMIN — LEVALBUTEROL HYDROCHLORIDE 1.25 MG: 1.25 SOLUTION RESPIRATORY (INHALATION) at 19:24

## 2023-11-04 RX ADMIN — SODIUM ZIRCONIUM CYCLOSILICATE 10 G: 10 POWDER, FOR SUSPENSION ORAL at 09:54

## 2023-11-04 RX ADMIN — ACYCLOVIR 400 MG: 200 CAPSULE ORAL at 09:52

## 2023-11-04 RX ADMIN — FAMOTIDINE 20 MG: 20 TABLET, FILM COATED ORAL at 17:43

## 2023-11-04 RX ADMIN — AMLODIPINE BESYLATE 10 MG: 5 TABLET ORAL at 09:51

## 2023-11-04 RX ADMIN — FAMOTIDINE 20 MG: 20 TABLET, FILM COATED ORAL at 09:50

## 2023-11-04 RX ADMIN — ENOXAPARIN SODIUM 40 MG: 40 INJECTION SUBCUTANEOUS at 09:49

## 2023-11-04 RX ADMIN — GABAPENTIN 300 MG: 300 CAPSULE ORAL at 17:36

## 2023-11-04 RX ADMIN — GABAPENTIN 300 MG: 300 CAPSULE ORAL at 21:09

## 2023-11-04 RX ADMIN — INSULIN HUMAN 10 UNITS: 100 INJECTION, SOLUTION PARENTERAL at 07:39

## 2023-11-04 RX ADMIN — METOPROLOL TARTRATE 25 MG: 25 TABLET, FILM COATED ORAL at 21:09

## 2023-11-04 RX ADMIN — NICOTINE 7 MG: 7 PATCH, EXTENDED RELEASE TRANSDERMAL at 09:50

## 2023-11-04 RX ADMIN — IPRATROPIUM BROMIDE 0.5 MG: 0.5 SOLUTION RESPIRATORY (INHALATION) at 14:18

## 2023-11-04 RX ADMIN — SERTRALINE 75 MG: 25 TABLET, FILM COATED ORAL at 09:49

## 2023-11-04 RX ADMIN — OXYCODONE HYDROCHLORIDE 5 MG: 5 SOLUTION ORAL at 04:42

## 2023-11-04 RX ADMIN — OXYCODONE HYDROCHLORIDE 5 MG: 5 SOLUTION ORAL at 21:08

## 2023-11-04 RX ADMIN — QUETIAPINE FUMARATE 50 MG: 25 TABLET ORAL at 21:09

## 2023-11-04 RX ADMIN — ONDANSETRON: 2 INJECTION INTRAMUSCULAR; INTRAVENOUS at 09:04

## 2023-11-04 RX ADMIN — FORMOTEROL FUMARATE DIHYDRATE 20 MCG: 20 SOLUTION RESPIRATORY (INHALATION) at 08:53

## 2023-11-04 RX ADMIN — GABAPENTIN 300 MG: 300 CAPSULE ORAL at 09:55

## 2023-11-04 RX ADMIN — OXYCODONE HYDROCHLORIDE 5 MG: 5 SOLUTION ORAL at 13:02

## 2023-11-04 RX ADMIN — DEXTROSE MONOHYDRATE 25 ML: 25 INJECTION, SOLUTION INTRAVENOUS at 07:37

## 2023-11-04 RX ADMIN — BUSPIRONE HYDROCHLORIDE 30 MG: 10 TABLET ORAL at 21:09

## 2023-11-04 RX ADMIN — FORMOTEROL FUMARATE DIHYDRATE 20 MCG: 20 SOLUTION RESPIRATORY (INHALATION) at 19:24

## 2023-11-04 RX ADMIN — CALCIUM GLUCONATE 1 G: 20 INJECTION, SOLUTION INTRAVENOUS at 07:47

## 2023-11-04 NOTE — PLAN OF CARE
Problem: MOBILITY - ADULT  Goal: Maintain or return to baseline ADL function  Description: INTERVENTIONS:  -  Assess patient's ability to carry out ADLs; assess patient's baseline for ADL function and identify physical deficits which impact ability to perform ADLs (bathing, care of mouth/teeth, toileting, grooming, dressing, etc.)  - Assess/evaluate cause of self-care deficits   - Assess range of motion  - Assess patient's mobility; develop plan if impaired  - Assess patient's need for assistive devices and provide as appropriate  - Encourage maximum independence but intervene and supervise when necessary  - Involve family in performance of ADLs  - Assess for home care needs following discharge   - Consider OT consult to assist with ADL evaluation and planning for discharge  - Provide patient education as appropriate  Outcome: Progressing     Problem: Prexisting or High Potential for Compromised Skin Integrity  Goal: Skin integrity is maintained or improved  Description: INTERVENTIONS:  - Identify patients at risk for skin breakdown  - Assess and monitor skin integrity  - Assess and monitor nutrition and hydration status  - Monitor labs   - Assess for incontinence   - Turn and reposition patient  - Assist with mobility/ambulation  - Relieve pressure over bony prominences  - Avoid friction and shearing  - Provide appropriate hygiene as needed including keeping skin clean and dry  - Evaluate need for skin moisturizer/barrier cream  - Collaborate with interdisciplinary team   - Patient/family teaching  - Consider wound care consult   Outcome: Progressing     Problem: Nutrition/Hydration-ADULT  Goal: Nutrient/Hydration intake appropriate for improving, restoring or maintaining nutritional needs  Description: Monitor and assess patient's nutrition/hydration status for malnutrition. Collaborate with interdisciplinary team and initiate plan and interventions as ordered.   Monitor patient's weight and dietary intake as ordered or per policy. Utilize nutrition screening tool and intervene as necessary. Determine patient's food preferences and provide high-protein, high-caloric foods as appropriate.      INTERVENTIONS:  - Monitor oral intake, urinary output, labs, and treatment plans  - Assess nutrition and hydration status and recommend course of action  - Evaluate amount of meals eaten  - Assist patient with eating if necessary   - Allow adequate time for meals  - Recommend/ encourage appropriate diets, oral nutritional supplements, and vitamin/mineral supplements  - Order, calculate, and assess calorie counts as needed  - Recommend, monitor, and adjust tube feedings and TPN/PPN based on assessed needs  - Assess need for intravenous fluids  - Provide specific nutrition/hydration education as appropriate  - Include patient/family/caregiver in decisions related to nutrition  Outcome: Progressing     Problem: PAIN - ADULT  Goal: Verbalizes/displays adequate comfort level or baseline comfort level  Description: Interventions:  - Encourage patient to monitor pain and request assistance  - Assess pain using appropriate pain scale  - Administer analgesics based on type and severity of pain and evaluate response  - Implement non-pharmacological measures as appropriate and evaluate response  - Consider cultural and social influences on pain and pain management  - Notify physician/advanced practitioner if interventions unsuccessful or patient reports new pain  Outcome: Progressing     Problem: INFECTION - ADULT  Goal: Absence or prevention of progression during hospitalization  Description: INTERVENTIONS:  - Assess and monitor for signs and symptoms of infection  - Monitor lab/diagnostic results  - Monitor all insertion sites, i.e. indwelling lines, tubes, and drains  - Monitor endotracheal if appropriate and nasal secretions for changes in amount and color  - [place] appropriate cooling/warming therapies per order  - Administer medications as ordered  - Instruct and encourage patient and family to use good hand hygiene technique  - Identify and instruct in appropriate isolation precautions for identified infection/condition  Outcome: Progressing     Problem: SAFETY ADULT  Goal: Maintain or return to baseline ADL function  Description: INTERVENTIONS:  -  Assess patient's ability to carry out ADLs; assess patient's baseline for ADL function and identify physical deficits which impact ability to perform ADLs (bathing, care of mouth/teeth, toileting, grooming, dressing, etc.)  - Assess/evaluate cause of self-care deficits   - Assess range of motion  - Assess patient's mobility; develop plan if impaired  - Assess patient's need for assistive devices and provide as appropriate  - Encourage maximum independence but intervene and supervise when necessary  - Involve family in performance of ADLs  - Assess for home care needs following discharge   - Consider OT consult to assist with ADL evaluation and planning for discharge  - Provide patient education as appropriate  Outcome: Progressing     Problem: Knowledge Deficit  Goal: Patient/family/caregiver demonstrates understanding of disease process, treatment plan, medications, and discharge instructions  Description: Complete learning assessment and assess knowledge base.   Interventions:  - Provide teaching at level of understanding  - Provide teaching via preferred learning methods  Outcome: Progressing     Problem: GENITOURINARY - ADULT  Goal: Maintains or returns to baseline urinary function  Description: INTERVENTIONS:  - Assess urinary function  - Encourage oral fluids to ensure adequate hydration if ordered  - Administer IV fluids as ordered to ensure adequate hydration  - Administer ordered medications as needed  - Offer frequent toileting  - Follow urinary retention protocol if ordered  Outcome: Progressing     Problem: METABOLIC, FLUID AND ELECTROLYTES - ADULT  Goal: Electrolytes maintained within normal limits  Description: INTERVENTIONS:  - Monitor labs and assess patient for signs and symptoms of electrolyte imbalances  - Administer electrolyte replacement as ordered  - Monitor response to electrolyte replacements, including repeat lab results as appropriate  - Instruct patient on fluid and nutrition as appropriate  Outcome: Progressing

## 2023-11-04 NOTE — ASSESSMENT & PLAN NOTE
Recent Labs     11/03/23  0446 11/03/23  1310 11/03/23  1851   K 6.0* 6.2* 6.2*   MG 2.3  --   --        Workup:  Etiology: Patient takes bactrim. Tumor lysis would be less likely since calcium is elevated rather than decreased.  Uric acid is normal.   Pending morning labs  Will give another round of medical management if still elevated    Plan:  1 amp of D50 followed by 10 units IV regular insulin and IV lasix

## 2023-11-04 NOTE — CONSULTS
Consultation - Infectious Disease   Josephineelizabeth Wood 77 y.o. female MRN: [id number]  Unit/Bed#: ICU 03 Encounter: [id number]      IMPRESSION & RECOMMENDATIONS:     1. Acute hypoxic respiratory failure. Present on admission 9/13/2023. Patient found to have an obstructing soft tissue mass in the neck. Status post trach and biopsy 9/17/2023 and pathology was consistent with diffuse large B-cell lymphoma. The patient has undergone 2 cycles of chemotherapy with R-EPOCH. Over the last week, the patient has had worsening hypoxia despite broad-spectrum antibiotics with vancomycin and cefepime. Chest x-ray shows diffuse abnormal interstitial and alveolar disease. Sputum culture growing mixed respiratory stacy. The patient is afebrile and despite hypoxia appears relatively well. Low concern for typical bacterial infection given imaging findings and lack of response to antibiotics. Could consider an atypical viral or fungal infection however the patient has been in the hospital for 50 days so has not had very recent environmental exposure and she has been on antimicrobial prophylaxis with acyclovir, fluconazole, Bactrim since starting on chemotherapy. Low concern for PJP given continuous Bactrim prophylaxis, appearance of chest imaging, normal LDH. Consideration for a viral or drug-induced pneumonitis, ARDS. -Recommend stopping vancomycin and cefepime   -Continue antimicrobial prophylaxis as below   -Okay with high-dose steroids per ICU   -Follow-up CT chest read, bronchoscopy   -Check RP 2, Fungitell, urine histoplasma antigen, Aspergillus galactomannan antigen and antibody, CMV PCR   -Please send bronchoscopy for cytology, aerobic and anaerobic, fungal, AFB, viral culture, pneumocystis DFA   -Additional supportive care per critical care team    2. Diffuse large B-cell lymphoma. Diagnosed this admission after the patient presented with an obstructing neck mass.   Confirmed on biopsy 9/17 and the patient underwent trach placement. Status post 2 cycles of R-EPOCH, last on 10/20/2023 and she is starting cycle 3 today.    -Oncology follow-up   -Continue antimicrobial prophylaxis per oncology with Bactrim, Levaquin, fluconazole, acyclovir   -Monitor for TLS    3. Oropharyngeal mass. Present on admission. Status post trach placement and biopsy 9/17. Pathology consistent with diffuse large B-cell lymphoma.   -Trach management per ICU    4. PO on CKD. Creatinine fluctuating this admission, may be ATN related to chemo medications and volume shifts. -Monitor creatinine   -Nephrotoxic agents   -Dose adjust antimicrobials for creatinine clearance    I have discussed the above management plan in detail with the primary service, the patient and the patient's daughter at bedside. ID will see again 11/6/2023, please call with questions. I have performed an extensive review of the medical records in Epic including review of the notes, radiographs, and laboratory results     HISTORY OF PRESENT ILLNESS:  Reason for Consult: Acute hypoxic respiratory failure  HPI: Jeanette Cheng is a 77 y.o. woman with a history of hypertension, tobacco use, bipolar disorder who was admitted to [address] [place] [address] 9/13/2023 with acute hypoxic respiratory failure. The patient was found to have severe angioedema. The patient was intubated for airway protection. CT of the neck showed a large soft tissue mass involving multiple spaces centered within the left oropharynx. A tracheostomy was placed by ENT and biopsies were obtained 9/17 by ENT. Josef Rousseau was transitioned to a cuffed trach 10/3. Pathology of the mass demonstrated diffuse large B-cell lymphoma. The patient was seen by oncology and underwent 2 cycles of R-EPOCH (cycle 2 completed 10/20/2023). Cycle 3 was started today 11/3. The patient has been on antimicrobial prophylaxis since 10/13 with acyclovir, fluconazole, levofloxacin, Bactrim.   The patient's hospital course has been complicated by persistent hypoxia. 10/29 there was some concern for pneumonia due to increased secretions and she was started on vancomycin and cefepime. Sputum culture showed growth of mixed respiratory stacy. Despite this, hypoxia has continued to worsen and the patient was transferred back to the ICU today. Chest x-ray shows diffuse abnormal interstitial and alveolar disease which appears worse from prior. A CT chest is pending and the critical care team is also planning to perform a bronchoscopy, possibly tomorrow. ID is consulted for further management given persistent hypoxia in an immunocompromised host.  On evaluation, the patient denies shortness of breath but is noted to have significant secretions. She is frustrated with still being in the hospital.  She has no fever or chills. Her main complaint is feeling tired. REVIEW OF SYSTEMS:  A complete review of systems is negative other than that noted in the HPI.     PAST MEDICAL HISTORY:  Past Medical History:   Diagnosis Date    Anxiety     Depression     Fatty liver     Hyperlipidemia     Hypertension     Psychiatric disorder     Varicella      Past Surgical History:   Procedure Laterality Date    BREAST SURGERY Bilateral     Reduction Procedure    CARDIAC SURGERY       SECTION      x4    DILATION AND CURETTAGE OF UTERUS      ECTOPIC PREGNANCY SURGERY      IR GASTROSTOMY TUBE PLACEMENT  2023    IR TUNNELED CENTRAL LINE PLACEMENT  10/19/2023     [address] INCL FLUOR GDNCE DX W/CELL WASHG SPX N/A 2023    Procedure: BRONCHOSCOPY FLEXIBLE;  Surgeon: Bakari Iniguez MD;  Location: [facility];  Service: ENT    TRACHEOSTOMY N/A 2023    Procedure: TRACHEOSTOMY, biopsy of nasopharynx mass;  Surgeon: Bakari Iniguez MD;  Location: [facility];  Service: ENT       FAMILY HISTORY:  Non-contributory    SOCIAL HISTORY:  Social History   Social History     Substance and Sexual Activity   Alcohol Use Not Currently     Social History Substance and Sexual Activity   Drug Use Yes    Types: Marijuana    Comment: for pain     Social History     Tobacco Use   Smoking Status Every Day    Packs/day: 1.00    Types: Cigarettes   Smokeless Tobacco Never   Tobacco Comments    2 Black and milds per day       ALLERGIES:  Allergies   Allergen Reactions    Methyldopa Shortness Of Breath and Other (See Comments)     Aldomet       MEDICATIONS:  All current active medications have been reviewed. PHYSICAL EXAM:  Temp:  [97.9 °F (36.6 °C)-99.3 °F (37.4 °C)] 97.9 °F (36.6 °C)  HR:  [] 104  Resp:  [21] 21  BP: (109-144)/(55-77) 144/77  SpO2:  [83 %-96 %] 95 %  Temp (24hrs), Av.7 °F (37.1 °C), Min:97.9 °F (36.6 °C), Max:99.3 °F (37.4 °C)  Current: Temperature: 97.9 °F (36.6 °C)    Intake/Output Summary (Last 24 hours) at 11/3/2023 2005  Last data filed at 11/3/2023 1644  Gross per 24 hour   Intake 533 ml   Output 500 ml   Net 33 ml       General Appearance:  Appearing well, no distress   Head:  Normocephalic, without obvious abnormality, atraumatic   Eyes:  Conjunctiva pink and sclera anicteric, both eyes   Nose: Nares normal, mucosa normal, no drainage   Throat: Trach in place, patient on high flow nasal cannula   Neck: Trach in place   Back:   Symmetric, no curvature, ROM normal, no CVA tenderness   Lungs: Tachypnea, crackles at the lung bases   Chest Wall:  No tenderness or deformity   Heart:  RRR; no murmur, rub or gallop   Abdomen:   Soft, non-tender, non-distended, PEG in place   Extremities: No cyanosis, clubbing or edema   Skin: No rashes or lesions. No draining wounds noted. Lymph nodes: Cervical, supraclavicular nodes normal   Neurologic: Alert and oriented times 3, extremity strength 5/5 and symmetric       LABS, IMAGING, & OTHER STUDIES:  Lab Results:  I have personally reviewed pertinent labs.   Results from last 7 days   Lab Units 23  0446 23  0430 23  0507   WBC Thousand/uL 15.68* 13.13* 14.96*   HEMOGLOBIN g/dL 7. 2* 7.2* 7.5*   PLATELETS Thousands/uL 222 183 173     Results from last 7 days   Lab Units 11/03/23  1851 11/03/23  1310 11/03/23  0446 11/02/23  0430 11/01/23  0507 10/31/23  0509   SODIUM mmol/L 138  --  136 136 135 135   POTASSIUM mmol/L 6.2*   < > 6.0* 5.3 5.1 5.0   CHLORIDE mmol/L 102  --  103 101 100 100   CO2 mmol/L 29  --  31 32 30 31   BUN mg/dL 48*  --  44* 36* 36* 34*   CREATININE mg/dL 1.51*  --  1.38* 1.27 1.18 1.33*   EGFR ml/min/1.73sq m 34  --  38 42 46 40   CALCIUM mg/dL 10.5*   < > 9.8 9.8 9.6 9.7   AST U/L  --   --  14  --  13 16   ALT U/L  --   --  19  --  23 28   ALK PHOS U/L  --   --  85  --  94 99    < > = values in this interval not displayed. Results from last 7 days   Lab Units 10/28/23  2217   SPUTUM CULTURE  2+ Growth of   GRAM STAIN RESULT  No Epithelial cells seen*  2+ Disintegrating polys*  Rare Gram positive cocci in pairs*     Results from last 7 days   Lab Units 10/29/23  1249 10/28/23  0427   PROCALCITONIN ng/ml 0.24 0.29*                   Imaging Studies:   I have personally reviewed pertinent imaging study reports and images in PACS.     CT Chest: diffuse patchy interstitial and airspace opacities

## 2023-11-04 NOTE — PROGRESS NOTES
Vancomycin IV Pharmacy-to-Dose Consultation     Vancomycin has been discontinued. Pharmacy will sign off. Please contact or re-consult with questions.     Teresa Alves, Pharmacist

## 2023-11-04 NOTE — PROGRESS NOTES
[address]  Transfer to the ICU  Name: Chiquis Merida  MRN: [id number]  Unit/Bed#: ICU 03 I Date of Admission: 9/13/2023   Date of Service: 11/4/2023 I Hospital Day: 46    Assessment/Plan   * Acute respiratory failure with hypoxia secondary to oropharyngeal mass  Assessment & Plan  Cuffless trach placed 9/17, transitioned to cuffed on 10/3. Oxygen requirements not improving. For mental health patient is on buspirone, quetiapine, and sertraline. For pain, gabapentin, oxycodone scheduled and prn, prn oxycodone mainly utilized for increased pain during chemo and after a bronch. On Guaifenesin, Atrovent and Xopenex for airway maintenance. No improvement in bilateral consolidation or atelectasis and groundglass opacities on repeat CT and chest x-rays. Bronchial cx with 3 colonies CoNS. PCP PCR invalid, repeat negative. CTCAP 10/12:  indicates additional groundglass opacity with paraseptal emphysema, which may be contributing to inability to remain off vent. Off Vent since 10/23. Patient was transferred to floors 11/2, however desaturated to <50% spo2. Recent Labs     11/01/23  0507   PHVEN 7.358   TVT4ANN 61.4*   PO2VEN 40.1   [id number] 33.8*   BEVEN 7.2       Cuffless trach 9/17, cuffed Shiley XLT #7 10/3  Plan:  Complete a 7 day course of cefepime and Vancomycin. Dc today  ID consult              -Check RP 2, Fungitell, urine histoplasma antigen, Aspergillus galactomannan antigen and antibody, CMV PCR  May need bronch, however trach currently cuffless. Considering Chemo induced pneumonitis. Will discuss with heme onc. Patient on 100 prednisone BID  Continue trach collar during the day and at night. May need pressure support at night  Wean down HFNC to a goal FiO2 of 50% to be discharged      Hyperkalemia  Assessment & Plan  Recent Labs     11/03/23  0446 11/03/23  1310 11/03/23  1851   K 6.0* 6.2* 6.2*   MG 2.3  --   --        Workup:  Etiology: Patient takes bactrim.  Tumor lysis would be less likely since calcium is elevated rather than decreased. Uric acid is normal.   Pending morning labs  Will give another round of medical management if still elevated    Plan:  1 amp of D50 followed by 10 units IV regular insulin and IV lasix      Large cell lymphoma ([address])  Assessment & Plan  Large B-cell lymphoma, stage II. First chemo cycle 9/22 4 days of R-EPOCH. 9/27 Rituxan. 9/28 Filgrastim. Received nivestym 300 mc 7 days dcd on 10/26      Prophylaxis:  Bactrim prophylaxis Monday Wednesday Friday,  Acyclovir 400 mg twice daily  Fluconazole 200 mg daily  Levofloxacin 250 mg daily  Lovenox 40 mg daily    9/22  4 days of R-EPOCH.  9/27 Rituxan  10/13 for [facility] cycle 2, which was done over 4 days  cytoxan on 10/19. Plan:  Received R EPOCH day 1/4 of cycle 3 today  Oncology is following  Transfuse blood products as needed. Keep Hgb>7 and Plt>20 for chemo   See acute respiratory failure above    Pancytopenia due to chemotherapy (HCC)-resolved as of 11/3/2023  Assessment & Plan  Patient received 1 PRBC transfusion on 10/5. Hemoglobin 8.2 s/p 1 PRBC transfusion on 10/25. B/L is 12-14. No sites of bleeding, no black stools. Iron panel indicative of inflammatory anemia. Normal Vitamin B12 levels, negative hemolysis smear. Folate is low 5.5. Plan:  Trend hemoglobin and transfuse for <7. As per heme/onc transfuse irradiated and leukoreduced blood products. Trend platelets  Folic acid 1mg daily supplementation  As per my conversation with Heme/oncology attending, patient is expected to have chemotherapy associated pancytopenia. No further anemia w/u required. Angioedema-resolved as of 11/4/2023  Assessment & Plan  POA in [place] ([place] ED: Swollen tongue, soft and hard palate with severe angioedema. Decadron 10 mg, Benadryl 25 mg given. 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     Plan:  Cuffless trach 9/17, cuffed Shiley XLT #7 10/3  See acute respiratory failure and oropharyngeal mass above  Continue to wean vent. Depression  Assessment & Plan  Patient on Zoloft 75 daily and Seroquel hs  Patient is depressed, multiple family/palliative discussions. patient is firm that she wants to live and to fight, she is just tired. Currently goal is disease treatment oriented. Full code. No SI/HI ideations. Palliative signed off, will reconsult if patient changes her mind regarding wishes. Generalized abdominal pain  Assessment & Plan  Patient reports abdominal pain which is unchanged since 9/22 no guarding on exam. Takes Famotidine for GERD at home. Alk phos 10/2 145, 10/20 79. CTCAP reveals complex right upper pole renal lesion requiring possible outpatient MRI    Continue Famotidine  On bowel regimen with Senna and Miralax prn    Chronic Superficial thrombophlebitis  Assessment & Plan  Right forearm soreness. RUE US: No acute or chronic deep vein thrombosis. 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 PO not severe enough to require renal dosing at this time, will continue to monitor and adjust dosing as needed. Continue DVT ppx with Lovenox 40    Blister (nonthermal) of left forearm, sequela  Assessment & Plan  Blisters of left upper extremity healing, no signs of infection, continue daily wound care. Oropharyngeal mass  Assessment & Plan  9/14 Neck/ soft tissue CT: Large enhancing soft tissue mass identified within the left neck involving multiple spaces. Centered within the left oropharynx with extensions as described above into multiple spaces. This results in significant airway compromise. 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 Small level 3/level 4 nodes are seen within the neck. Tracheostomy by ENT. Replaced on 9/26. H/o tobacco abuse, daily smoker. On nicotine while inpatient, confirmed with patient she needs nicotine patch. CT soft tissue neck shows reduced mass effect from 9/14 to 10/13.  Can consider reducing trach size if continues to improve    Plan:  ENT and heme onc following  As per speech therapist diet  Dysphagia 2. Continue parallel PEG tube feeds for nutrition. See acute respiratory failure and large B cell lymphoma above. Ambulatory dysfunction  Assessment & Plan  2/2 Impacted by chronic pain syndrome + prolonged hospital stay. Continue OOB with PT. PT rec post acute rehab however reportedly Cannot get LTACH placement while getting chemo per CM  She is aware STR SNFs continue to follow and treatment can be done from a SNF level of care. PT would need to be on trach collar for at least 72 hours with no need for the vent. Aware that pt would need to be around 28% FiO2     (HFpEF) heart failure with preserved ejection fraction [facility])  Assessment & Plan  Wt Readings from Last 3 Encounters:   11/03/23 77.1 kg (169 lb 15.6 oz)   09/07/23 74.6 kg (164 lb 6.4 oz)   08/30/23 73.3 kg (161 lb 9.6 oz)     Lab Results   Component Value Date    LVEF 60 08/28/2023     (H) 10/28/2023     (H) 09/29/2023     Echo 8/28/23: LVEF 60%. Plan:  K > 4   Measure I/O      CKD (chronic kidney disease) stage 3, GFR 30-59 ml/min [facility])  Assessment & Plan  Lab Results   Component Value Date    EGFR 38 11/03/2023    EGFR 42 11/02/2023    EGFR 46 11/01/2023    CREATININE 1.38 (H) 11/03/2023    CREATININE 1.27 11/02/2023    CREATININE 1.18 11/01/2023     Baseline Cr between 0.75-1.1  Initial PO felt 2/2 prerenal azotemia 2/2 diuresis, reduced PO intake +/- ATN. Patient received 2 doses of Bumex IV 2 g as she had not been responding appropriately to IV 40 Lasix daily. Creatinine went up by 0.5 meeting criteria for an PO. This may be prerenal intrinsic or postrenal.  Potential prerenal causes include reduced kidney perfusion due to lisinopril. Intrinsic can also be lisinopril due to ATN, AIN from chemo medications, TLS, crystaline nephropathy from acyclovir, rituxan nephrotoxicity, filgrastim glomerulonephritis.  Post renal obstructive could be from urine retention from vincristine or cyclophosphamide bladder fibrosis. Suspect most likely due to medications as a combination of prerenal and intrinsic. FENa was 0.2%, UA shows no casts or signs of infection, urine eosinophils 0%. Patient likely has prerenal HERIBERTO from volume depletion. Received 2 L NS and 1 pRBC and cr went up by 0.07 still and Na and Cl went down, suspect that volume prevented further cr rise and free water excretion impaired by kidney function, allowing hyponatremia to increase. Creatinine increase is likely plateaued and went down the following day. Suspect that now that nephrotoxic medications have been stopped she responded well to Lasix and creatinine downtrended. HERIBERTO now resolved. Will be cautious about nephrotoxins and monitor as restarting EPOCH and ppx. Patient gets volume depleted and overloaded very easily. Especially from chemo. 10/19 Heriberto as cr went up by 0.3 in 48 hours from 0.82 on 10/17 to 1.22 on 10/19. Suspect this is prerenal hypovolemic, will see if patient improves with fluids. She gets overloaded easily so if no improvement suspect CRS again. 10/29 HERIBERTO cr went up by 0.3 again. She has 922 ml UOP in last 24 hours. HERIBERTO likely from lasix 40mg IV given yesterday. She is not hypovolemic. Plan:   Continue to monitor UO/renal function. Avoid nephrotoxic agents  Continue to monitor a.m. BMP. Pain syndrome, chronic  Assessment & Plan  Home regimen: Oxycodone 5 mg BID, Tylenol 650 mg q8 prn. Gabapentin 600 mg TID. Medical marijuana. Lumbar radiculopathy and impaired ambulatory dysfunction secondary to pain. Has bowel regimen and is having bowel movements daily. Patient required additional pain management during previous cycle and has some additional pain from tunneled line placement    Plan:  Continue gabapentin 300 mg TID, oxycodone 5 mg TID, prn Tylenol 650 mg q6.     Oxycodone 5mg every 8 hours  Oxycodone 2.5 mg q6 PRN    Benign essential hypertension  Assessment & Plan  Home regimen Coreg 12.5 mg BID, Amlodipine 10 mg daily, and lisinopril 40 mg. All discontinued at this time secondary to hypotension and PO since cycle 1. but stable currently without any antihypertensives. Systolic persistently elevated and tachycardic, potentially secondary to pain. Patient was more hypotensive during last chemo. Coreg contraindicated during chemo, since cycles are every other week, would be better to stick with lopressor during duration of therapy as opposed to switching constantly. Plan:  Monitor vitals. Continue Norvasc 10 and lopressor 25 bid  Prn hydralazine for SBP > 160             ICU Core Measures     Vented Patient  VAP Bundle  VAP bundle ordered     A: Assess, Prevent, and Manage Pain Has pain been assessed? Yes  Need for changes to pain regimen? No   B: Both Spontaneous Awakening Trials (SATs) and Spontaneous Breathing Trials (SBTs) Plan to perform spontaneous awakening trial today? N/A      C: Choice of Sedation RASS Goal: 0 Alert and Calm or N/A patient not on sedation  Need for changes to sedation or analgesia regimen? No   D: Delirium CAM-ICU: Negative   E: Early Mobility  Plan for early mobility? Yes   F: Family Engagement Plan for family engagement today? Yes       Antibiotic Review: Continue broad spectrum secondary to severity of illness. Review of Invasive Devices:      Central access plan: Medications requiring central line      Prophylaxis:  VTE VTE covered by:  enoxaparin, Subcutaneous, 40 mg at 11/03/23 0904       Stress Ulcer  covered byfamotidine (PEPCID) tablet 20 mg [[id number]]       Subjective   HPI:   24hr events: Desaturation yesterday to 50% retransfered to the ICU. Patient is more comfortable today. No fevers no chills. Good appetitie. Review of Systems   Constitutional:  Negative for chills and fever. HENT:  Negative for ear pain and sore throat. Eyes:  Negative for pain and visual disturbance.    Respiratory: Positive for shortness of breath. Negative for cough. Cardiovascular:  Negative for chest pain and palpitations. Gastrointestinal:  Negative for abdominal pain and vomiting. Genitourinary:  Negative for dysuria and hematuria. Musculoskeletal:  Negative for arthralgias and back pain. Skin:  Negative for color change and rash. Neurological:  Negative for seizures and syncope. All other systems reviewed and are negative. Objective                            Vitals I/O      Most Recent Min/Max in 24hrs   Temp 98.7 °F (37.1 °C) Temp  Min: 97.9 °F (36.6 °C)  Max: 99.3 °F (37.4 °C)   Pulse 104 Pulse  Min: 95  Max: 118   Resp 21 Resp  Min: 21  Max: 21   /66 BP  Min: 109/55  Max: 144/77   O2 Sat 94 % SpO2  Min: 83 %  Max: 98 %      Intake/Output Summary (Last 24 hours) at 11/4/2023 0639  Last data filed at 11/4/2023 0448  Gross per 24 hour   Intake 705 ml   Output 800 ml   Net -95 ml           Diet Enteral/Parenteral; Tube Feeding with Oral Diet; Two Telly HN; Bolus; 228; (4x/day) - 8:00AM,12:00PM,4:00PM,8:00PM; 95; Water; Before and After each Bolus; Dysphagia/Modified Consistency; Dysphagia 2-Mechanical Soft; Honey Thick Liquid; Honey T... Invasive Monitoring Physical exam    Physical Exam  Constitutional:       Appearance: She is ill-appearing.       Comments: Patient did not allow me to do a physical exam on her today            Diagnostic Studies      EKG: NSR  Imaging: no      Medications:  Scheduled PRN   acyclovir, 400 mg, BID  amLODIPine, 10 mg, Daily  budesonide, 0.5 mg, Q12H  busPIRone, 30 mg, TID  cefepime, 2,000 mg, Q12H  chlorhexidine, 15 mL, Q12H [address]  [START ON 11/7/2023] cyclophosphamide (CYTOXAN) 1,350 mg in sodium chloride 0.9 % 250 mL IVPB, 750 mg/m2 (Treatment Plan Recorded), Once  DOXOrubicin (ADRIAMYCIN) 18 mg, etoposide (TOPOSAR) 67.6 mg, vinCRIStine (ONCOVIN) 0.7 mg in sodium chloride 0.9 % 500 mL infusion, , Q24H  enoxaparin, 40 mg, Q24H ASHLEY  famotidine, 20 mg, BID  fentanyl citrate (PF), 100 mcg, Once  fluconazole, 603 mg, Daily  folic acid, 1 mg, Daily  formoterol, 20 mcg, Q12H  gabapentin, 300 mg, TID  levalbuterol, 1.25 mg, Q6H   And  ipratropium, 0.5 mg, Q6H  levofloxacin, 500 mg, Q24H ASHLEY  melatonin, 3 mg, HS  metoprolol tartrate, 25 mg, Q12H ASHLEY  midazolam, 2 mg, Once  nicotine, 7 mg, Daily  ondansetron (ZOFRAN) 16 mg, dexamethasone (DECADRON) 10 mg in sodium chloride 0.9 % 50 mL IVPB, , Q24H  oxyCODONE, 5 mg, Q8H  predniSONE, 60 mg/m2 (Treatment Plan Recorded), BID With Meals  QUEtiapine, 50 mg, HS  senna, 8.8 mg, HS  sertraline, 75 mg, Daily  [START ON 11/7/2023] sodium chloride, 2,000 mL, Once  sulfamethoxazole-trimethoprim, 1 tablet, Once per day on Mon Wed Fri  vancomycin, 750 mg, Q24H      alteplase, 2 mg, Q1MIN PRN  alteplase, 2 mg, Q1MIN PRN  alteplase, 2 mg, Q1MIN PRN  ondansetron, 4 mg, Q8H PRN  ondansetron, 4 mg, Q8H PRN  oxyCODONE, 2.5 mg, Q6H PRN  sodium chloride, 20 mL/hr, Daily PRN       Continuous          Labs:    CBC    Recent Labs     11/03/23 0446   WBC 15.68*   HGB 7.2*   HCT 23.5*      BANDSPCT 5       BMP    Recent Labs     11/03/23  0446 11/03/23  1310 11/03/23  1851   SODIUM 136  --  138   K 6.0* 6.2* 6.2*     --  102   CO2 31  --  29   AGAP 2  --  7   BUN 44*  --  48*   CREATININE 1.38*  --  1.51*   CALCIUM 9.8 10.0 10.5*         Coags    No recent results     Additional Electrolytes  Recent Labs     11/03/23 0446   MG 2.3   PHOS 4.1            Blood Gas    No recent results  No recent results   LFTs  Recent Labs     11/03/23 0446   ALT 19   AST 14   ALKPHOS 85   ALB 3.0*   TBILI 0.20         Infectious  No recent results  Glucose  Recent Labs     11/03/23  0446 11/03/23  1851   GLUC 96 159*             CC time per attending  Mario Duffy MD

## 2023-11-05 LAB
ALBUMIN SERPL BCP-MCNC: 3.3 G/DL (ref 3.5–5)
ALP SERPL-CCNC: 80 U/L (ref 34–104)
ALT SERPL W P-5'-P-CCNC: 24 U/L (ref 7–52)
ANION GAP SERPL CALCULATED.3IONS-SCNC: 5 MMOL/L
ANISOCYTOSIS BLD QL SMEAR: PRESENT
AST SERPL W P-5'-P-CCNC: 19 U/L (ref 13–39)
BASOPHILS # BLD MANUAL: 0 THOUSAND/UL (ref 0–0.1)
BASOPHILS NFR MAR MANUAL: 0 % (ref 0–1)
BILIRUB SERPL-MCNC: 0.21 MG/DL (ref 0.2–1)
BUN SERPL-MCNC: 61 MG/DL (ref 5–25)
CALCIUM ALBUM COR SERPL-MCNC: 10.1 MG/DL (ref 8.3–10.1)
CALCIUM SERPL-MCNC: 9.5 MG/DL (ref 8.4–10.2)
CHLORIDE SERPL-SCNC: 105 MMOL/L (ref 96–108)
CO2 SERPL-SCNC: 29 MMOL/L (ref 21–32)
CREAT SERPL-MCNC: 1.12 MG/DL (ref 0.6–1.3)
EOSINOPHIL # BLD MANUAL: 0 THOUSAND/UL (ref 0–0.4)
EOSINOPHIL NFR BLD MANUAL: 0 % (ref 0–6)
ERYTHROCYTE [DISTWIDTH] IN BLOOD BY AUTOMATED COUNT: 16.3 % (ref 11.6–15.1)
GFR SERPL CREATININE-BSD FRML MDRD: 49 ML/MIN/1.73SQ M
GLUCOSE SERPL-MCNC: 144 MG/DL (ref 65–140)
HCT VFR BLD AUTO: 23.6 % (ref 34.8–46.1)
HGB BLD-MCNC: 7.2 G/DL (ref 11.5–15.4)
HYPERCHROMIA BLD QL SMEAR: PRESENT
LDH SERPL-CCNC: 224 U/L (ref 140–271)
LYMPHOCYTES # BLD AUTO: 1.1 THOUSAND/UL (ref 0.6–4.47)
LYMPHOCYTES # BLD AUTO: 5 % (ref 14–44)
MAGNESIUM SERPL-MCNC: 1.9 MG/DL (ref 1.9–2.7)
MCH RBC QN AUTO: 30.5 PG (ref 26.8–34.3)
MCHC RBC AUTO-ENTMCNC: 30.5 G/DL (ref 31.4–37.4)
MCV RBC AUTO: 100 FL (ref 82–98)
MONOCYTES # BLD AUTO: 0.44 THOUSAND/UL (ref 0–1.22)
MONOCYTES NFR BLD: 2 % (ref 4–12)
NEUTROPHILS # BLD MANUAL: 20.43 THOUSAND/UL (ref 1.85–7.62)
NEUTS BAND NFR BLD MANUAL: 13 % (ref 0–8)
NEUTS SEG NFR BLD AUTO: 80 % (ref 43–75)
PHOSPHATE SERPL-MCNC: 3.7 MG/DL (ref 2.3–4.1)
PLATELET # BLD AUTO: 368 THOUSANDS/UL (ref 149–390)
PLATELET BLD QL SMEAR: ADEQUATE
PMV BLD AUTO: 9.4 FL (ref 8.9–12.7)
POLYCHROMASIA BLD QL SMEAR: PRESENT
POTASSIUM SERPL-SCNC: 5.5 MMOL/L (ref 3.5–5.3)
PROT SERPL-MCNC: 6.4 G/DL (ref 6.4–8.4)
RBC # BLD AUTO: 2.36 MILLION/UL (ref 3.81–5.12)
RBC MORPH BLD: PRESENT
SODIUM SERPL-SCNC: 139 MMOL/L (ref 135–147)
URATE SERPL-MCNC: 3.7 MG/DL (ref 2–7.5)
WBC # BLD AUTO: 21.97 THOUSAND/UL (ref 4.31–10.16)

## 2023-11-05 PROCEDURE — 85027 COMPLETE CBC AUTOMATED: CPT | Performed by: INTERNAL MEDICINE

## 2023-11-05 PROCEDURE — 85007 BL SMEAR W/DIFF WBC COUNT: CPT | Performed by: INTERNAL MEDICINE

## 2023-11-05 PROCEDURE — 84550 ASSAY OF BLOOD/URIC ACID: CPT | Performed by: INTERNAL MEDICINE

## 2023-11-05 PROCEDURE — 83615 LACTATE (LD) (LDH) ENZYME: CPT | Performed by: INTERNAL MEDICINE

## 2023-11-05 PROCEDURE — 84100 ASSAY OF PHOSPHORUS: CPT | Performed by: INTERNAL MEDICINE

## 2023-11-05 PROCEDURE — 99232 SBSQ HOSP IP/OBS MODERATE 35: CPT | Performed by: INTERNAL MEDICINE

## 2023-11-05 PROCEDURE — 83735 ASSAY OF MAGNESIUM: CPT | Performed by: INTERNAL MEDICINE

## 2023-11-05 PROCEDURE — 94760 N-INVAS EAR/PLS OXIMETRY 1: CPT

## 2023-11-05 PROCEDURE — 94668 MNPJ CHEST WALL SBSQ: CPT

## 2023-11-05 PROCEDURE — 94640 AIRWAY INHALATION TREATMENT: CPT

## 2023-11-05 PROCEDURE — 80053 COMPREHEN METABOLIC PANEL: CPT | Performed by: INTERNAL MEDICINE

## 2023-11-05 PROCEDURE — 94669 MECHANICAL CHEST WALL OSCILL: CPT

## 2023-11-05 RX ORDER — FUROSEMIDE 10 MG/ML
40 INJECTION INTRAMUSCULAR; INTRAVENOUS
Status: DISCONTINUED | OUTPATIENT
Start: 2023-11-05 | End: 2023-11-15

## 2023-11-05 RX ADMIN — ACYCLOVIR 400 MG: 200 CAPSULE ORAL at 17:29

## 2023-11-05 RX ADMIN — BUDESONIDE 0.5 MG: 0.5 INHALANT ORAL at 07:57

## 2023-11-05 RX ADMIN — GABAPENTIN 300 MG: 300 CAPSULE ORAL at 21:47

## 2023-11-05 RX ADMIN — FAMOTIDINE 20 MG: 20 TABLET, FILM COATED ORAL at 17:28

## 2023-11-05 RX ADMIN — METOPROLOL TARTRATE 25 MG: 25 TABLET, FILM COATED ORAL at 09:58

## 2023-11-05 RX ADMIN — QUETIAPINE FUMARATE 50 MG: 25 TABLET ORAL at 21:47

## 2023-11-05 RX ADMIN — FORMOTEROL FUMARATE DIHYDRATE 20 MCG: 20 SOLUTION RESPIRATORY (INHALATION) at 21:12

## 2023-11-05 RX ADMIN — PREDNISONE 100 MG: 50 TABLET ORAL at 17:28

## 2023-11-05 RX ADMIN — METOPROLOL TARTRATE 25 MG: 25 TABLET, FILM COATED ORAL at 21:48

## 2023-11-05 RX ADMIN — ETOPOSIDE: 20 INJECTION, SOLUTION INTRAVENOUS at 11:03

## 2023-11-05 RX ADMIN — OXYCODONE HYDROCHLORIDE 2.5 MG: 5 SOLUTION ORAL at 17:53

## 2023-11-05 RX ADMIN — ENOXAPARIN SODIUM 40 MG: 40 INJECTION SUBCUTANEOUS at 09:58

## 2023-11-05 RX ADMIN — ACYCLOVIR 400 MG: 200 CAPSULE ORAL at 10:01

## 2023-11-05 RX ADMIN — FUROSEMIDE 40 MG: 10 INJECTION, SOLUTION INTRAMUSCULAR; INTRAVENOUS at 17:28

## 2023-11-05 RX ADMIN — GABAPENTIN 300 MG: 300 CAPSULE ORAL at 09:58

## 2023-11-05 RX ADMIN — Medication 8.8 MG: at 21:47

## 2023-11-05 RX ADMIN — AMLODIPINE BESYLATE 10 MG: 5 TABLET ORAL at 09:58

## 2023-11-05 RX ADMIN — LEVALBUTEROL HYDROCHLORIDE 1.25 MG: 1.25 SOLUTION RESPIRATORY (INHALATION) at 07:57

## 2023-11-05 RX ADMIN — FORMOTEROL FUMARATE DIHYDRATE 20 MCG: 20 SOLUTION RESPIRATORY (INHALATION) at 07:57

## 2023-11-05 RX ADMIN — OXYCODONE HYDROCHLORIDE 5 MG: 5 SOLUTION ORAL at 05:00

## 2023-11-05 RX ADMIN — NICOTINE 7 MG: 7 PATCH, EXTENDED RELEASE TRANSDERMAL at 09:59

## 2023-11-05 RX ADMIN — BUSPIRONE HYDROCHLORIDE 30 MG: 10 TABLET ORAL at 10:02

## 2023-11-05 RX ADMIN — IPRATROPIUM BROMIDE 0.5 MG: 0.5 SOLUTION RESPIRATORY (INHALATION) at 01:31

## 2023-11-05 RX ADMIN — SERTRALINE 75 MG: 25 TABLET, FILM COATED ORAL at 09:59

## 2023-11-05 RX ADMIN — LEVALBUTEROL HYDROCHLORIDE 1.25 MG: 1.25 SOLUTION RESPIRATORY (INHALATION) at 01:31

## 2023-11-05 RX ADMIN — LEVALBUTEROL HYDROCHLORIDE 1.25 MG: 1.25 SOLUTION RESPIRATORY (INHALATION) at 13:27

## 2023-11-05 RX ADMIN — FOLIC ACID 1 MG: 1 TABLET ORAL at 09:59

## 2023-11-05 RX ADMIN — GABAPENTIN 300 MG: 300 CAPSULE ORAL at 17:28

## 2023-11-05 RX ADMIN — FAMOTIDINE 20 MG: 20 TABLET, FILM COATED ORAL at 09:59

## 2023-11-05 RX ADMIN — MELATONIN 3 MG: at 21:47

## 2023-11-05 RX ADMIN — CHLORHEXIDINE GLUCONATE 15 ML: 1.2 SOLUTION ORAL at 09:59

## 2023-11-05 RX ADMIN — IPRATROPIUM BROMIDE 0.5 MG: 0.5 SOLUTION RESPIRATORY (INHALATION) at 07:57

## 2023-11-05 RX ADMIN — OXYCODONE HYDROCHLORIDE 5 MG: 5 SOLUTION ORAL at 12:18

## 2023-11-05 RX ADMIN — IPRATROPIUM BROMIDE 0.5 MG: 0.5 SOLUTION RESPIRATORY (INHALATION) at 21:12

## 2023-11-05 RX ADMIN — FLUCONAZOLE 400 MG: 100 TABLET ORAL at 09:58

## 2023-11-05 RX ADMIN — BUSPIRONE HYDROCHLORIDE 30 MG: 10 TABLET ORAL at 17:29

## 2023-11-05 RX ADMIN — BUSPIRONE HYDROCHLORIDE 30 MG: 10 TABLET ORAL at 21:47

## 2023-11-05 RX ADMIN — IPRATROPIUM BROMIDE 0.5 MG: 0.5 SOLUTION RESPIRATORY (INHALATION) at 13:27

## 2023-11-05 RX ADMIN — LEVOFLOXACIN 500 MG: 250 TABLET, FILM COATED ORAL at 09:59

## 2023-11-05 RX ADMIN — PREDNISONE 100 MG: 50 TABLET ORAL at 10:01

## 2023-11-05 RX ADMIN — OXYCODONE HYDROCHLORIDE 5 MG: 5 SOLUTION ORAL at 21:47

## 2023-11-05 RX ADMIN — BUDESONIDE 0.5 MG: 0.5 INHALANT ORAL at 21:12

## 2023-11-05 RX ADMIN — LEVALBUTEROL HYDROCHLORIDE 1.25 MG: 1.25 SOLUTION RESPIRATORY (INHALATION) at 21:12

## 2023-11-05 RX ADMIN — CHLORHEXIDINE GLUCONATE 15 ML: 1.2 SOLUTION ORAL at 21:47

## 2023-11-05 RX ADMIN — ONDANSETRON: 2 INJECTION INTRAMUSCULAR; INTRAVENOUS at 08:54

## 2023-11-05 NOTE — ASSESSMENT & PLAN NOTE
Cuffless trach placed 9/17, transitioned to cuffed on 10/3. Oxygen requirements not improving. For mental health patient is on buspirone, quetiapine, and sertraline. For pain, gabapentin, oxycodone scheduled and prn, prn oxycodone mainly utilized for increased pain during chemo and after a bronch. On Guaifenesin, Atrovent and Xopenex for airway maintenance. No improvement in bilateral consolidation or atelectasis and groundglass opacities on repeat CT and chest x-rays. Bronchial cx with 3 colonies CoNS. PCP PCR invalid, repeat negative. CTCAP 10/12:  indicates additional groundglass opacity with paraseptal emphysema, which may be contributing to inability to remain off vent. Off Vent since 10/23. Patient was transferred to floors 11/2, however desaturated to <50% spo2. No results for input(s): "PHART", "ZVF8UIQ", "PO2ART", "PSM8PYS", "PHVEN", "XKY2EKX", "PO2VEN", "PFG2BVD", "Elinore Jn", "SOURCE" in the last 72 hours. Cuffless trach 9/17, cuffed Shiley XLT #7 10/3    Plan:  Completed 7 day course of cefepime and Vancomycin. ID consult  RP 2 negative. Pending Fungitell, urine histoplasma antigen, Aspergillus galactomannan antigen and antibody, CMV PCR  May need bronch, however trach currently cuffless. Considering Chemo induced pneumonitis. Will discuss with heme onc. Patient on 100 prednisone BID  Continue trach collar during the day and at night.   May need pressure support at night  Wean down HFNC to a goal FiO2 of 50% to be discharged

## 2023-11-05 NOTE — ASSESSMENT & PLAN NOTE
Lab Results   Component Value Date    CREATININE 1.12 11/05/2023    CREATININE 1.40 (H) 11/04/2023    CREATININE 1.51 (H) 11/03/2023       Likely prerenal.  Second to poor oral intake versus poor urine output. Baseline creatinine 1 to 1.2.     Cr at baseline  Plan:  Urinary retention protocol, Daily weights, I/O  Trend Cr daily

## 2023-11-05 NOTE — ASSESSMENT & PLAN NOTE
POA in [place] ([place] ED: Swollen tongue, soft and hard palate with severe angioedema. Decadron 10 mg, Benadryl 25 mg given. 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 Plan:  Cuffless trach 9/17, cuffed Shiley XLT #7 10/3  See acute respiratory failure and oropharyngeal mass above  Continue to wean vent.

## 2023-11-05 NOTE — ASSESSMENT & PLAN NOTE
Lab Results   Component Value Date    CHOLESTEROL 118 10/09/2023    CHOLESTEROL 203 (H) 01/24/2023    CHOLESTEROL 177 08/11/2022     Lab Results   Component Value Date    HDL 14 (L) 10/09/2023    HDL 45 (L) 01/24/2023    HDL 37 (L) 08/11/2022     Lab Results   Component Value Date    TRIG 144 10/09/2023    TRIG 166 (H) 09/16/2023    TRIG 160 (H) 01/24/2023     Lab Results   Component Value Date    NONHDLC 104 10/09/2023    [address] 146 07/12/2018    [address] 207 (H) 04/29/2013     Continue outpatient diet and lifestyle modification.

## 2023-11-05 NOTE — ASSESSMENT & PLAN NOTE
Large B-cell lymphoma, stage II. First chemo cycle 9/22 4 days of R-EPOCH. 9/27 Rituxan. 9/28 Filgrastim. Received nivestym 300 mc 7 days dcd on 10/26      Prophylaxis:  Bactrim prophylaxis Monday Wednesday Friday,  Acyclovir 400 mg twice daily  Fluconazole 200 mg daily  Levofloxacin 250 mg daily  Lovenox 40 mg daily    9/22  4 days of R-EPOCH.  9/27 Rituxan  10/13 for [facility] cycle 2, which was done over 4 days  cytoxan on 10/19. Plan:  Received R EPOCH day 3/4 of cycle 3 today  Cytoxan ordered for 11/7. Oncology is following  Transfuse blood products as needed.   Keep Hgb>7 and Plt>20 for chemo   See acute respiratory failure above

## 2023-11-05 NOTE — ASSESSMENT & PLAN NOTE
Recent Labs     11/03/23  0446 11/03/23  1310 11/04/23  0524 11/04/23  1017 11/04/23  1742 11/05/23  0434   K 6.0*   < > 6.6* 5.9* 5.3 5.5*   MG 2.3  --  2.1  --   --  1.9    < > = values in this interval not displayed. Workup:  Etiology: Patient takes bactrim. Tumor lysis would be less likely since calcium is elevated rather than decreased. Uric acid is normal.   Pending morning labs  Will give another round of medical management if still elevated. Currently slightly above target range. Plan:  Trend potassium on daily BMP.   1 amp of D50 followed by 10 units IV regular insulin and IV lasix

## 2023-11-05 NOTE — ASSESSMENT & PLAN NOTE
Wt Readings from Last 3 Encounters:   11/04/23 77.1 kg (169 lb 15.6 oz)   09/07/23 74.6 kg (164 lb 6.4 oz)   08/30/23 73.3 kg (161 lb 9.6 oz)     Lab Results   Component Value Date    LVEF 60 08/28/2023     (H) 10/28/2023     (H) 09/29/2023     Echo 8/28/23: LVEF 60%.        Plan:  K > 4   Measure I/O

## 2023-11-05 NOTE — ASSESSMENT & PLAN NOTE
Lab Results   Component Value Date    EGFR 49 11/05/2023    EGFR 38 11/04/2023    EGFR 34 11/03/2023    CREATININE 1.12 11/05/2023    CREATININE 1.40 (H) 11/04/2023    CREATININE 1.51 (H) 11/03/2023     Baseline Cr between 0.75-1.1  Initial HERIBERTO felt 2/2 prerenal azotemia 2/2 diuresis, reduced PO intake +/- ATN. Patient received 2 doses of Bumex IV 2 g as she had not been responding appropriately to IV 40 Lasix daily. Creatinine went up by 0.5 meeting criteria for an HERIBERTO. This may be prerenal intrinsic or postrenal.  Potential prerenal causes include reduced kidney perfusion due to lisinopril. Intrinsic can also be lisinopril due to ATN, AIN from chemo medications, TLS, crystaline nephropathy from acyclovir, rituxan nephrotoxicity, filgrastim glomerulonephritis. Post renal obstructive could be from urine retention from vincristine or cyclophosphamide bladder fibrosis. Suspect most likely due to medications as a combination of prerenal and intrinsic. FENa was 0.2%, UA shows no casts or signs of infection, urine eosinophils 0%. Patient likely has prerenal HERIBERTO from volume depletion. Received 2 L NS and 1 pRBC and cr went up by 0.07 still and Na and Cl went down, suspect that volume prevented further cr rise and free water excretion impaired by kidney function, allowing hyponatremia to increase. Creatinine increase is likely plateaued and went down the following day. Suspect that now that nephrotoxic medications have been stopped she responded well to Lasix and creatinine downtrended. HERIBERTO now resolved. Will be cautious about nephrotoxins and monitor as restarting EPOCH and ppx. Patient gets volume depleted and overloaded very easily. Especially from chemo. 10/19 Heriberto as cr went up by 0.3 in 48 hours from 0.82 on 10/17 to 1.22 on 10/19. Suspect this is prerenal hypovolemic, will see if patient improves with fluids. She gets overloaded easily so if no improvement suspect CRS again.      10/29 HERIBERTO cr went up by 0.3 again. She has 922 ml UOP in last 24 hours. PO likely from lasix 40mg IV given yesterday. She is not hypovolemic. Plan:   Continue to monitor UO/renal function. Avoid nephrotoxic agents  Continue to monitor a.m. BMP.

## 2023-11-05 NOTE — PLAN OF CARE
Problem: MOBILITY - ADULT  Goal: Maintain or return to baseline ADL function  Description: INTERVENTIONS:  -  Assess patient's ability to carry out ADLs; assess patient's baseline for ADL function and identify physical deficits which impact ability to perform ADLs (bathing, care of mouth/teeth, toileting, grooming, dressing, etc.)  - Assess/evaluate cause of self-care deficits   - Assess range of motion  - Assess patient's mobility; develop plan if impaired  - Assess patient's need for assistive devices and provide as appropriate  - Encourage maximum independence but intervene and supervise when necessary  - Involve family in performance of ADLs  - Assess for home care needs following discharge   - Consider OT consult to assist with ADL evaluation and planning for discharge  - Provide patient education as appropriate  Outcome: Progressing  Goal: Maintains/Returns to pre admission functional level  Description: INTERVENTIONS:  - Perform BMAT or MOVE assessment daily.   - Set and communicate daily mobility goal to care team and patient/family/caregiver.    - Collaborate with rehabilitation services on mobility goals if consulted  - Out of bed for toileting  - Record patient progress and toleration of activity level   Outcome: Progressing     Problem: Prexisting or High Potential for Compromised Skin Integrity  Goal: Skin integrity is maintained or improved  Description: INTERVENTIONS:  - Identify patients at risk for skin breakdown  - Assess and monitor skin integrity  - Assess and monitor nutrition and hydration status  - Monitor labs   - Assess for incontinence   - Turn and reposition patient  - Assist with mobility/ambulation  - Relieve pressure over bony prominences  - Avoid friction and shearing  - Provide appropriate hygiene as needed including keeping skin clean and dry  - Evaluate need for skin moisturizer/barrier cream  - Collaborate with interdisciplinary team   - Patient/family teaching  - Consider wound care consult   Outcome: Progressing     Problem: Nutrition/Hydration-ADULT  Goal: Nutrient/Hydration intake appropriate for improving, restoring or maintaining nutritional needs  Description: Monitor and assess patient's nutrition/hydration status for malnutrition. Collaborate with interdisciplinary team and initiate plan and interventions as ordered. Monitor patient's weight and dietary intake as ordered or per policy. Utilize nutrition screening tool and intervene as necessary. Determine patient's food preferences and provide high-protein, high-caloric foods as appropriate.      INTERVENTIONS:  - Monitor oral intake, urinary output, labs, and treatment plans  - Assess nutrition and hydration status and recommend course of action  - Evaluate amount of meals eaten  - Assist patient with eating if necessary   - Allow adequate time for meals  - Recommend/ encourage appropriate diets, oral nutritional supplements, and vitamin/mineral supplements  - Order, calculate, and assess calorie counts as needed  - Recommend, monitor, and adjust tube feedings and TPN/PPN based on assessed needs  - Assess need for intravenous fluids  - Provide specific nutrition/hydration education as appropriate  - Include patient/family/caregiver in decisions related to nutrition  Outcome: Progressing     Problem: PAIN - ADULT  Goal: Verbalizes/displays adequate comfort level or baseline comfort level  Description: Interventions:  - Encourage patient to monitor pain and request assistance  - Assess pain using appropriate pain scale  - Administer analgesics based on type and severity of pain and evaluate response  - Implement non-pharmacological measures as appropriate and evaluate response  - Consider cultural and social influences on pain and pain management  - Notify physician/advanced practitioner if interventions unsuccessful or patient reports new pain  Outcome: Progressing     Problem: INFECTION - ADULT  Goal: Absence or prevention of progression during hospitalization  Description: INTERVENTIONS:  - Assess and monitor for signs and symptoms of infection  - Monitor lab/diagnostic results  - Monitor all insertion sites, i.e. indwelling lines, tubes, and drains  - Monitor endotracheal if appropriate and nasal secretions for changes in amount and color  - [place] appropriate cooling/warming therapies per order  - Administer medications as ordered  - Instruct and encourage patient and family to use good hand hygiene technique  - Identify and instruct in appropriate isolation precautions for identified infection/condition  Outcome: Progressing  Goal: Absence of fever/infection during neutropenic period  Description: INTERVENTIONS:  - Monitor WBC    Outcome: Progressing     Problem: SAFETY ADULT  Goal: Maintain or return to baseline ADL function  Description: INTERVENTIONS:  -  Assess patient's ability to carry out ADLs; assess patient's baseline for ADL function and identify physical deficits which impact ability to perform ADLs (bathing, care of mouth/teeth, toileting, grooming, dressing, etc.)  - Assess/evaluate cause of self-care deficits   - Assess range of motion  - Assess patient's mobility; develop plan if impaired  - Assess patient's need for assistive devices and provide as appropriate  - Encourage maximum independence but intervene and supervise when necessary  - Involve family in performance of ADLs  - Assess for home care needs following discharge   - Consider OT consult to assist with ADL evaluation and planning for discharge  - Provide patient education as appropriate  Outcome: Progressing  Goal: Maintains/Returns to pre admission functional level  Description: INTERVENTIONS:  - Perform BMAT or MOVE assessment daily.   - Set and communicate daily mobility goal to care team and patient/family/caregiver.    - Collaborate with rehabilitation services on mobility goals if consulted  - Out of bed for toileting  - Record patient progress and toleration of activity level   Outcome: Progressing  Goal: Patient will remain free of falls  Description: INTERVENTIONS:  - Educate patient/family on patient safety including physical limitations  - Instruct patient to call for assistance with activity   - Consult OT/PT to assist with strengthening/mobility   - Keep Call bell within reach  - Keep bed low and locked with side rails adjusted as appropriate  - Keep care items and personal belongings within reach  - Initiate and maintain comfort rounds  - Make Fall Risk Sign visible to staff  - Apply yellow socks and bracelet for high fall risk patients  - Consider moving patient to room near nurses station  Outcome: Progressing     Problem: DISCHARGE PLANNING  Goal: Discharge to home or other facility with appropriate resources  Description: INTERVENTIONS:  - Identify barriers to discharge w/patient and caregiver  - Arrange for needed discharge resources and transportation as appropriate  - Identify discharge learning needs (meds, wound care, etc.)  - Arrange for interpretive services to assist at discharge as needed  - Refer to Case Management Department for coordinating discharge planning if the patient needs post-hospital services based on physician/advanced practitioner order or complex needs related to functional status, cognitive ability, or social support system  Outcome: Progressing     Problem: Knowledge Deficit  Goal: Patient/family/caregiver demonstrates understanding of disease process, treatment plan, medications, and discharge instructions  Description: Complete learning assessment and assess knowledge base.   Interventions:  - Provide teaching at level of understanding  - Provide teaching via preferred learning methods  Outcome: Progressing     Problem: RESPIRATORY - ADULT  Goal: Achieves optimal ventilation and oxygenation  Description: INTERVENTIONS:  - Assess for changes in respiratory status  - Assess for changes in mentation and behavior  - Position to facilitate oxygenation and minimize respiratory effort  - Oxygen administered by appropriate delivery if ordered  - Initiate smoking cessation education as indicated  - Encourage broncho-pulmonary hygiene including cough, deep breathe, Incentive Spirometry  - Assess the need for suctioning and aspirate as needed  - Assess and instruct to report SOB or any respiratory difficulty  - Respiratory Therapy support as indicated  Outcome: Progressing     Problem: GENITOURINARY - ADULT  Goal: Maintains or returns to baseline urinary function  Description: INTERVENTIONS:  - Assess urinary function  - Encourage oral fluids to ensure adequate hydration if ordered  - Administer IV fluids as ordered to ensure adequate hydration  - Administer ordered medications as needed  - Offer frequent toileting  - Follow urinary retention protocol if ordered  Outcome: Progressing  Goal: Absence of urinary retention  Description: INTERVENTIONS:  - Assess patient’s ability to void and empty bladder  - Monitor I/O  - Bladder scan as needed  - Discuss with physician/AP medications to alleviate retention as needed  - Discuss catheterization for long term situations as appropriate  Outcome: Progressing     Problem: METABOLIC, FLUID AND ELECTROLYTES - ADULT  Goal: Electrolytes maintained within normal limits  Description: INTERVENTIONS:  - Monitor labs and assess patient for signs and symptoms of electrolyte imbalances  - Administer electrolyte replacement as ordered  - Monitor response to electrolyte replacements, including repeat lab results as appropriate  - Instruct patient on fluid and nutrition as appropriate  Outcome: Progressing  Goal: Fluid balance maintained  Description: INTERVENTIONS:  - Monitor labs   - Monitor I/O and WT  - Instruct patient on fluid and nutrition as appropriate  - Assess for signs & symptoms of volume excess or deficit  Outcome: Progressing  Goal: Glucose maintained within target range  Description: INTERVENTIONS:  - Monitor Blood Glucose as ordered  - Assess for signs and symptoms of hyperglycemia and hypoglycemia  - Administer ordered medications to maintain glucose within target range  - Assess nutritional intake and initiate nutrition service referral as needed  Outcome: Progressing

## 2023-11-05 NOTE — PROGRESS NOTES
[address] [facility]  Progress Note  Name: Piedad Gomez  MRN: [id number]  Unit/Bed#: ICU 03 I Date of Admission: 9/13/2023   Date of Service: 11/5/2023 I Hospital Day: 48    Assessment/Plan   * Acute respiratory failure with hypoxia secondary to oropharyngeal mass  Assessment & Plan  Cuffless trach placed 9/17, transitioned to cuffed on 10/3. Oxygen requirements not improving. For mental health patient is on buspirone, quetiapine, and sertraline. For pain, gabapentin, oxycodone scheduled and prn, prn oxycodone mainly utilized for increased pain during chemo and after a bronch. On Guaifenesin, Atrovent and Xopenex for airway maintenance. No improvement in bilateral consolidation or atelectasis and groundglass opacities on repeat CT and chest x-rays. Bronchial cx with 3 colonies CoNS. PCP PCR invalid, repeat negative. CTCAP 10/12:  indicates additional groundglass opacity with paraseptal emphysema, which may be contributing to inability to remain off vent. Off Vent since 10/23. Patient was transferred to floors 11/2, however desaturated to <50% spo2. No results for input(s): "PHART", "OPE4KXL", "PO2ART", "ENH6ASQ", "PHVEN", "BMT2EXS", "PO2VEN", "EES8OTA", "Chaitanya Pellet", "SOURCE" in the last 72 hours. Cuffless trach 9/17, cuffed Shiley XLT #7 10/3    Plan:  Completed 7 day course of cefepime and Vancomycin. ID consult  RP 2 negative. Pending Fungitell, urine histoplasma antigen, Aspergillus galactomannan antigen and antibody, CMV PCR  May need bronch, however trach currently cuffless. Considering Chemo induced pneumonitis. Will discuss with heme onc. Patient on 100 prednisone BID  Continue trach collar during the day and at night.   May need pressure support at night  Wean down HFNC to a goal FiO2 of 50% to be discharged      Hyperkalemia  Assessment & Plan  Recent Labs     11/03/23  0446 11/03/23  1310 11/04/23  0524 11/04/23  1017 11/04/23  1742 11/05/23  0434   K 6.0*   < > 6.6* 5.9* 5.3 5.5*   MG 2.3  --  2.1  --   --  1.9    < > = values in this interval not displayed. Workup:  Etiology: Patient takes bactrim. Tumor lysis would be less likely since calcium is elevated rather than decreased. Uric acid is normal.   Pending morning labs  Will give another round of medical management if still elevated. Currently slightly above target range. Plan:  Trend potassium on daily BMP. 1 amp of D50 followed by 10 units IV regular insulin and IV lasix      Large cell lymphoma (HCC)  Assessment & Plan  Large B-cell lymphoma, stage II. First chemo cycle 9/22 4 days of R-EPOCH. 9/27 Rituxan. 9/28 Filgrastim. Received nivestym 300 mc 7 days dcd on 10/26      Prophylaxis:  Bactrim prophylaxis Monday Wednesday Friday,  Acyclovir 400 mg twice daily  Fluconazole 200 mg daily  Levofloxacin 250 mg daily  Lovenox 40 mg daily    9/22  4 days of R-EPOCH.  9/27 Rituxan  10/13 for [facility] cycle 2, which was done over 4 days  cytoxan on 10/19. Plan:  Received R EPOCH day 3/4 of cycle 3 today  Cytoxan ordered for 11/7. Oncology is following  Transfuse blood products as needed. Keep Hgb>7 and Plt>20 for chemo   See acute respiratory failure above    Oropharyngeal mass  Assessment & Plan  9/14 Neck/ soft tissue CT: Large enhancing soft tissue mass identified within the left neck involving multiple spaces. Centered within the left oropharynx with extensions as described above into multiple spaces. This results in significant airway compromise. 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 Small level 3/level 4 nodes are seen within the neck. Tracheostomy by ENT. Replaced on 9/26. H/o tobacco abuse, daily smoker. On nicotine while inpatient, confirmed with patient she needs nicotine patch. CT soft tissue neck shows reduced mass effect from 9/14 to 10/13. Can consider reducing trach size if continues to improve    Plan:  ENT and heme onc following  As per speech therapist diet  Dysphagia 2.   Continue parallel PEG tube feeds for nutrition. See acute respiratory failure and large B cell lymphoma above. RML pneumonia-resolved as of 9/22/2023  Assessment & Plan  9/13 CT PE study: Patchy consolidation right middle lobe, new from 8/25/2023, compatible with pneumonia. No leukocytosis, no fever/chills. Positive for sore throat, cough. New onset pneumonia after recent hospitalization and antibiotics treatment. 9/14: Bronchoscopy/ ABL. MRSA negative. ABL revealed 2+ Growth of Candida albicans, suspected contaminant. Pancytopenia due to chemotherapy (HCC)-resolved as of 11/3/2023  Assessment & Plan  Patient received 1 PRBC transfusion on 10/5. Hemoglobin 8.2 s/p 1 PRBC transfusion on 10/25. B/L is 12-14. No sites of bleeding, no black stools. Iron panel indicative of inflammatory anemia. Normal Vitamin B12 levels, negative hemolysis smear. Folate is low 5.5. Plan:  Trend hemoglobin and transfuse for <7. As per heme/onc transfuse irradiated and leukoreduced blood products. Trend platelets  Folic acid 1mg daily supplementation  As per my conversation with Heme/oncology attending, patient is expected to have chemotherapy associated pancytopenia. No further anemia w/u required. PO (acute kidney injury) (HCC)-resolved as of 9/21/2023  Assessment & Plan  Lab Results   Component Value Date    CREATININE 1.12 11/05/2023    CREATININE 1.40 (H) 11/04/2023    CREATININE 1.51 (H) 11/03/2023       Likely prerenal.  Second to poor oral intake versus poor urine output. Baseline creatinine 1 to 1.2. Cr at baseline  Plan:  Urinary retention protocol, Daily weights, I/O  Trend Cr daily    Blister (nonthermal) of left forearm, sequela  Assessment & Plan  Blisters of left upper extremity healing, no signs of infection, continue daily wound care. Generalized abdominal pain  Assessment & Plan  Patient reports abdominal pain which is unchanged since 9/22 no guarding on exam. Takes Famotidine for GERD at home.  Alk phos 10/2 145, 10/20 79. CTCAP reveals complex right upper pole renal lesion requiring possible outpatient MRI    Continue Famotidine  On bowel regimen with Senna and Miralax prn    (HFpEF) heart failure with preserved ejection fraction [facility])  Assessment & Plan  Wt Readings from Last 3 Encounters:   11/04/23 77.1 kg (169 lb 15.6 oz)   09/07/23 74.6 kg (164 lb 6.4 oz)   08/30/23 73.3 kg (161 lb 9.6 oz)     Lab Results   Component Value Date    LVEF 60 08/28/2023     (H) 10/28/2023     (H) 09/29/2023     Echo 8/28/23: LVEF 60%. Plan:  K > 4   Measure I/O      Ambulatory dysfunction  Assessment & Plan  2/2 Impacted by chronic pain syndrome + prolonged hospital stay. Continue OOB with PT. PT rec post acute rehab however reportedly Cannot get LTACH placement while getting chemo per CM  She is aware STR SNFs continue to follow and treatment can be done from a SNF level of care. PT would need to be on trach collar for at least 72 hours with no need for the vent. Aware that pt would need to be around 28% FiO2     Depression  Assessment & Plan  Patient on Zoloft 75 daily and Seroquel hs  Patient is depressed, multiple family/palliative discussions. patient is firm that she wants to live and to fight, she is just tired. Currently goal is disease treatment oriented. Full code. No SI/HI ideations. Palliative signed off, will reconsult if patient changes her mind regarding wishes. Chronic Superficial thrombophlebitis  Assessment & Plan  Right forearm soreness. RUE US: No acute or chronic deep vein thrombosis. 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 PO not severe enough to require renal dosing at this time, will continue to monitor and adjust dosing as needed.       Continue DVT ppx with Lovenox 40    CKD (chronic kidney disease) stage 3, GFR 30-59 ml/min [facility])  Assessment & Plan  Lab Results   Component Value Date    EGFR 49 11/05/2023    EGFR 38 11/04/2023    EGFR 34 11/03/2023 CREATININE 1.12 11/05/2023    CREATININE 1.40 (H) 11/04/2023    CREATININE 1.51 (H) 11/03/2023     Baseline Cr between 0.75-1.1  Initial HERIBERTO felt 2/2 prerenal azotemia 2/2 diuresis, reduced PO intake +/- ATN. Patient received 2 doses of Bumex IV 2 g as she had not been responding appropriately to IV 40 Lasix daily. Creatinine went up by 0.5 meeting criteria for an HERIBERTO. This may be prerenal intrinsic or postrenal.  Potential prerenal causes include reduced kidney perfusion due to lisinopril. Intrinsic can also be lisinopril due to ATN, AIN from chemo medications, TLS, crystaline nephropathy from acyclovir, rituxan nephrotoxicity, filgrastim glomerulonephritis. Post renal obstructive could be from urine retention from vincristine or cyclophosphamide bladder fibrosis. Suspect most likely due to medications as a combination of prerenal and intrinsic. FENa was 0.2%, UA shows no casts or signs of infection, urine eosinophils 0%. Patient likely has prerenal HERIBETRO from volume depletion. Received 2 L NS and 1 pRBC and cr went up by 0.07 still and Na and Cl went down, suspect that volume prevented further cr rise and free water excretion impaired by kidney function, allowing hyponatremia to increase. Creatinine increase is likely plateaued and went down the following day. Suspect that now that nephrotoxic medications have been stopped she responded well to Lasix and creatinine downtrended. HERIBERTO now resolved. Will be cautious about nephrotoxins and monitor as restarting EPOCH and ppx. Patient gets volume depleted and overloaded very easily. Especially from chemo. 10/19 Heriberto as cr went up by 0.3 in 48 hours from 0.82 on 10/17 to 1.22 on 10/19. Suspect this is prerenal hypovolemic, will see if patient improves with fluids. She gets overloaded easily so if no improvement suspect CRS again. 10/29 HERIBERTO cr went up by 0.3 again. She has 922 ml UOP in last 24 hours. HERIBERTO likely from lasix 40mg IV given yesterday.  She is not hypovolemic. Plan:   Continue to monitor UO/renal function. Avoid nephrotoxic agents  Continue to monitor a.m. BMP. Pain syndrome, chronic  Assessment & Plan  Home regimen: Oxycodone 5 mg BID, Tylenol 650 mg q8 prn. Gabapentin 600 mg TID. Medical marijuana. Lumbar radiculopathy and impaired ambulatory dysfunction secondary to pain. Has bowel regimen and is having bowel movements daily. Patient required additional pain management during previous cycle and has some additional pain from tunneled line placement    Plan:  Continue gabapentin 300 mg TID, oxycodone 5 mg TID, prn Tylenol 650 mg q6. Oxycodone 5mg every 8 hours  Oxycodone 2.5 mg q6 PRN    Benign essential hypertension  Assessment & Plan  Home regimen Coreg 12.5 mg BID, Amlodipine 10 mg daily, and lisinopril 40 mg. All discontinued at this time secondary to hypotension and PO since cycle 1. but stable currently without any antihypertensives. Systolic persistently elevated and tachycardic, potentially secondary to pain. Patient was more hypotensive during last chemo. Coreg contraindicated during chemo, since cycles are every other week, would be better to stick with lopressor during duration of therapy as opposed to switching constantly. Plan:  Monitor vitals. Continue Norvasc 10 and lopressor 25 bid  Prn hydralazine for SBP > 160    Angioedema-resolved as of 11/4/2023  Assessment & Plan  POA in [place] ED: Swollen tongue, soft and hard palate with severe angioedema. Decadron 10 mg, Benadryl 25 mg given. 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 Plan:  Cuffless trach 9/17, cuffed Shiley XLT #7 10/3  See acute respiratory failure and oropharyngeal mass above  Continue to wean vent.      Hyperlipidemia-resolved as of 10/26/2023  Assessment & Plan  Lab Results   Component Value Date    CHOLESTEROL 118 10/09/2023    CHOLESTEROL 203 (H) 01/24/2023    CHOLESTEROL 177 08/11/2022     Lab Results   Component Value Date    HDL 14 (L) 10/09/2023    HDL 45 (L) 01/24/2023    HDL 37 (L) 08/11/2022     Lab Results   Component Value Date    TRIG 144 10/09/2023    TRIG 166 (H) 09/16/2023    TRIG 160 (H) 01/24/2023     Lab Results   Component Value Date    NONHDLC 104 10/09/2023    [address] 146 07/12/2018    [address] 207 (H) 04/29/2013     Continue outpatient diet and lifestyle modification. COPD without exacerbation (HCC)-resolved as of 9/26/2023  Assessment & Plan  Continue vent with nebs. Wean off vent. Encephalopathy-resolved as of 9/21/2023  Assessment & Plan  9/19- Pt appeared to be AAO x3. Improving. ICU Core Measures     Vented Patient  VAP Bundle  VAP bundle ordered      A: Assess, Prevent, and Manage Pain Has pain been assessed? Yes  Need for changes to pain regimen? No   B: Both Spontaneous Awakening Trials (SATs) and Spontaneous Breathing Trials (SBTs) Plan to perform spontaneous awakening trial today? N/A       C: Choice of Sedation RASS Goal: 0 Alert and Calm or N/A patient not on sedation  Need for changes to sedation or analgesia regimen? No   D: Delirium CAM-ICU: Negative   E: Early Mobility  Plan for early mobility? Yes   F: Family Engagement Plan for family engagement today? Yes        Antibiotic Review: Continue broad spectrum secondary to severity of illness. Review of Invasive Devices:      Central access plan: Medications requiring central line      Prophylaxis:  VTE VTE covered by:  enoxaparin, Subcutaneous, 40 mg at 11/05/23 0958       Stress Ulcer  covered byfamotidine (PEPCID) tablet 20 mg [[id number]]       Subjective   HPI/24hr events: Satting 98% on high flow. She denies any shortness of breath. She desatted to 84% but no changes to her O2 supplementation. Review of Systems   Constitutional:  Negative for chills and fever. HENT:  Negative for ear pain and sore throat. Eyes:  Negative for pain and visual disturbance. Respiratory:  Positive for cough. Negative for shortness of breath. Cardiovascular:  Negative for chest pain and palpitations. Gastrointestinal:  Negative for abdominal pain and vomiting. Genitourinary:  Negative for dysuria and hematuria. Musculoskeletal:  Positive for back pain. Negative for arthralgias. Skin:  Negative for color change and rash. Neurological:  Negative for seizures and syncope. All other systems reviewed and are negative. Objective                            Vitals I/O      Most Recent Min/Max in 24hrs   Temp 98.7 °F (37.1 °C) Temp  Min: 97.9 °F (36.6 °C)  Max: 98.7 °F (37.1 °C)   Pulse (!) 113 Pulse  Min: 100  Max: 113   Resp (!) 28 Resp  Min: 20  Max: 28   /76 BP  Min: 114/79  Max: 150/76   O2 Sat (!) 82 % SpO2  Min: 82 %  Max: 98 %      Intake/Output Summary (Last 24 hours) at 11/5/2023 1039  Last data filed at 11/5/2023 0820  Gross per 24 hour   Intake 1504 ml   Output 525 ml   Net 979 ml         Diet Enteral/Parenteral; Tube Feeding with Oral Diet; Two Telly HN; Bolus; 228; (4x/day) - 8:00AM,12:00PM,4:00PM,8:00PM; 95; Water; Before and After each Bolus; Dysphagia/Modified Consistency; Dysphagia 2-Mechanical Soft; Honey Thick Liquid; Honey T... Physical exam Invasive Monitoring   Physical Exam  Eyes:      Pupils: Pupils are equal, round, and reactive to light. HENT:      Head: Normocephalic and atraumatic. Nose: No congestion. Neck:      Comments: Trach collar  Cardiovascular:      Rate and Rhythm: Normal rate and regular rhythm. Abdominal:      General: There is no distension. Comments: PEG intact for Tube Feedings    Constitutional:       General: She is not in acute distress. Appearance: She is well-developed and well-nourished. She is ill-appearing. Pulmonary:      Effort: Pulmonary effort is normal.      Breath sounds: Normal breath sounds. Neurological:      General: No focal deficit present. Mental Status: She is alert and oriented to person, place and time.              Diagnostic Studies EKG: NSR  Imaging: CT chest 11/3 showed persistent severe bilateral alveolar and interstitial opacities superimposed upon pulmonary emphysema. Unchanged mostly from recent prior study. Markedly enlarged main pulmonary artery consistent with pulmonary arterial hypertension. I have personally reviewed pertinent reports.        Medications:  Scheduled PRN   acyclovir, 400 mg, BID  amLODIPine, 10 mg, Daily  budesonide, 0.5 mg, Q12H  busPIRone, 30 mg, TID  chlorhexidine, 15 mL, Q12H [facility]  [START ON 11/7/2023] cyclophosphamide (CYTOXAN) 1,350 mg in sodium chloride 0.9 % 250 mL IVPB, 750 mg/m2 (Treatment Plan Recorded), Once  DOXOrubicin (ADRIAMYCIN) 18 mg, etoposide (TOPOSAR) 67.6 mg, vinCRIStine (ONCOVIN) 0.7 mg in sodium chloride 0.9 % 500 mL infusion, , Q24H  enoxaparin, 40 mg, Q24H ASHLEY  famotidine, 20 mg, BID  fentanyl citrate (PF), 100 mcg, Once  fluconazole, 134 mg, Daily  folic acid, 1 mg, Daily  formoterol, 20 mcg, Q12H  gabapentin, 300 mg, TID  levalbuterol, 1.25 mg, Q6H   And  ipratropium, 0.5 mg, Q6H  levofloxacin, 500 mg, Q24H [facility]  melatonin, 3 mg, HS  metoprolol tartrate, 25 mg, Q12H ASHLEY  midazolam, 2 mg, Once  nicotine, 7 mg, Daily  ondansetron (ZOFRAN) 16 mg, dexamethasone (DECADRON) 10 mg in sodium chloride 0.9 % 50 mL IVPB, , Q24H  oxyCODONE, 5 mg, Q8H  predniSONE, 60 mg/m2 (Treatment Plan Recorded), BID With Meals  QUEtiapine, 50 mg, HS  senna, 8.8 mg, HS  sertraline, 75 mg, Daily  sulfamethoxazole-trimethoprim, 1 tablet, Once per day on Mon Wed Fri      alteplase, 2 mg, Q1MIN PRN  alteplase, 2 mg, Q1MIN PRN  alteplase, 2 mg, Q1MIN PRN  ondansetron, 4 mg, Q8H PRN  ondansetron, 4 mg, Q8H PRN  oxyCODONE, 2.5 mg, Q6H PRN  sodium chloride, 20 mL/hr, Daily PRN       Continuous          Labs:    CBC    Recent Labs     11/04/23  0524 11/05/23  0632   WBC 22.85* 21.97*   HGB 8.0* 7.2*   HCT 25.3* 23.6*    368   BANDSPCT 6 13*     BMP    Recent Labs     11/04/23  0524 11/04/23  1017 11/04/23  1740 11/05/23  0434   SODIUM 137  --   --  139   K 6.6*   < > 5.3 5.5*     --   --  105   CO2 29  --   --  29   AGAP 3  --   --  5   BUN 57*  --   --  61*   CREATININE 1.40*  --   --  1.12   CALCIUM 9.9  --   --  9.5    < > = values in this interval not displayed.        Coags    No recent results     Additional Electrolytes  Recent Labs     11/04/23 0524 11/04/23  1017 11/05/23 0434   MG 2.1  --  1.9   PHOS 1.7*  --  3.7   CAIONIZED  --  1.34*  --           Blood Gas    No recent results  No recent results LFTs  Recent Labs     11/04/23 0524 11/05/23 0434   ALT 19 24   AST 15 19   ALKPHOS 89 80   ALB 3.1* 3.3*   TBILI 0.19* 0.21       Infectious  No recent results  Glucose  Recent Labs     11/03/23  1851 11/04/23 0524 11/05/23 0434   GLUC 159* 187* 144*               Jose Alfredo Sosa MD

## 2023-11-06 ENCOUNTER — APPOINTMENT (INPATIENT)
Dept: GASTROENTEROLOGY | Facility: HOSPITAL | Age: 68
DRG: 004 | End: 2023-11-06
Payer: COMMERCIAL

## 2023-11-06 LAB
ALBUMIN SERPL BCP-MCNC: 3.2 G/DL (ref 3.5–5)
ALP SERPL-CCNC: 74 U/L (ref 34–104)
ALT SERPL W P-5'-P-CCNC: 34 U/L (ref 7–52)
ANION GAP SERPL CALCULATED.3IONS-SCNC: 7 MMOL/L
ANISOCYTOSIS BLD QL SMEAR: PRESENT
AST SERPL W P-5'-P-CCNC: 25 U/L (ref 13–39)
ATRIAL RATE: 112 BPM
BASE EXCESS BLDA CALC-SCNC: 2 MMOL/L (ref -2–3)
BASOPHILS # BLD MANUAL: 0 THOUSAND/UL (ref 0–0.1)
BASOPHILS NFR MAR MANUAL: 0 % (ref 0–1)
BILIRUB SERPL-MCNC: 0.19 MG/DL (ref 0.2–1)
BUN SERPL-MCNC: 60 MG/DL (ref 5–25)
CA-I BLD-SCNC: 1.33 MMOL/L (ref 1.12–1.32)
CALCIUM ALBUM COR SERPL-MCNC: 9.7 MG/DL (ref 8.3–10.1)
CALCIUM SERPL-MCNC: 9.1 MG/DL (ref 8.4–10.2)
CHLORIDE SERPL-SCNC: 107 MMOL/L (ref 96–108)
CMV DNA SERPL NAA+PROBE-ACNC: NOT DETECTED [IU]/ML
CO2 SERPL-SCNC: 29 MMOL/L (ref 21–32)
CREAT SERPL-MCNC: 0.94 MG/DL (ref 0.6–1.3)
EOSINOPHIL # BLD MANUAL: 0 THOUSAND/UL (ref 0–0.4)
EOSINOPHIL NFR BLD MANUAL: 0 % (ref 0–6)
ERYTHROCYTE [DISTWIDTH] IN BLOOD BY AUTOMATED COUNT: 16.4 % (ref 11.6–15.1)
FUNGUS SPEC CULT: NORMAL
FUNGUS SPEC CULT: NORMAL
GFR SERPL CREATININE-BSD FRML MDRD: 61 ML/MIN/1.73SQ M
GLUCOSE SERPL-MCNC: 139 MG/DL (ref 65–140)
GLUCOSE SERPL-MCNC: 173 MG/DL (ref 65–140)
HCO3 BLDA-SCNC: 29.2 MMOL/L (ref 24–30)
HCT VFR BLD AUTO: 22.8 % (ref 34.8–46.1)
HCT VFR BLD CALC: 22 % (ref 34.8–46.1)
HGB BLD-MCNC: 7.1 G/DL (ref 11.5–15.4)
HGB BLDA-MCNC: 7.5 G/DL (ref 11.5–15.4)
HYPERCHROMIA BLD QL SMEAR: PRESENT
LDH SERPL-CCNC: 204 U/L (ref 140–271)
LYMPHOCYTES # BLD AUTO: 0.49 THOUSAND/UL (ref 0.6–4.47)
LYMPHOCYTES # BLD AUTO: 4 % (ref 14–44)
MAGNESIUM SERPL-MCNC: 1.8 MG/DL (ref 1.9–2.7)
MCH RBC QN AUTO: 31 PG (ref 26.8–34.3)
MCHC RBC AUTO-ENTMCNC: 31.1 G/DL (ref 31.4–37.4)
MCV RBC AUTO: 100 FL (ref 82–98)
MONOCYTES # BLD AUTO: 0.25 THOUSAND/UL (ref 0–1.22)
MONOCYTES NFR BLD: 2 % (ref 4–12)
MYCOBACTERIUM SPEC CULT: NORMAL
NEUTROPHILS # BLD MANUAL: 11.53 THOUSAND/UL (ref 1.85–7.62)
NEUTS BAND NFR BLD MANUAL: 5 % (ref 0–8)
NEUTS SEG NFR BLD AUTO: 89 % (ref 43–75)
P AXIS: 71 DEGREES
PCO2 BLD: 31 MMOL/L (ref 21–32)
PCO2 BLD: 59 MM HG (ref 42–50)
PH BLD: 7.3 [PH] (ref 7.3–7.4)
PHOSPHATE SERPL-MCNC: 3.2 MG/DL (ref 2.3–4.1)
PLATELET # BLD AUTO: 405 THOUSANDS/UL (ref 149–390)
PLATELET BLD QL SMEAR: ADEQUATE
PMV BLD AUTO: 9.3 FL (ref 8.9–12.7)
PO2 BLD: 56 MM HG (ref 35–45)
POLYCHROMASIA BLD QL SMEAR: PRESENT
POTASSIUM BLD-SCNC: 4.9 MMOL/L (ref 3.5–5.3)
POTASSIUM SERPL-SCNC: 5.1 MMOL/L (ref 3.5–5.3)
PR INTERVAL: 168 MS
PROT SERPL-MCNC: 6.1 G/DL (ref 6.4–8.4)
QRS AXIS: 53 DEGREES
QRSD INTERVAL: 86 MS
QT INTERVAL: 302 MS
QTC INTERVAL: 412 MS
RBC # BLD AUTO: 2.29 MILLION/UL (ref 3.81–5.12)
RBC MORPH BLD: PRESENT
RHODAMINE-AURAMINE STN SPEC: NORMAL
SAO2 % BLD FROM PO2: 85 % (ref 60–85)
SODIUM BLD-SCNC: 143 MMOL/L (ref 136–145)
SODIUM SERPL-SCNC: 143 MMOL/L (ref 135–147)
SPECIMEN SOURCE: ABNORMAL
T WAVE AXIS: 74 DEGREES
URATE SERPL-MCNC: 4.4 MG/DL (ref 2–7.5)
VENTRICULAR RATE: 112 BPM
WBC # BLD AUTO: 12.27 THOUSAND/UL (ref 4.31–10.16)

## 2023-11-06 PROCEDURE — 31623 DX BRONCHOSCOPE/BRUSH: CPT | Performed by: INTERNAL MEDICINE

## 2023-11-06 PROCEDURE — 84295 ASSAY OF SERUM SODIUM: CPT

## 2023-11-06 PROCEDURE — 87070 CULTURE OTHR SPECIMN AEROBIC: CPT | Performed by: INTERNAL MEDICINE

## 2023-11-06 PROCEDURE — 87252 VIRUS INOCULATION TISSUE: CPT | Performed by: INTERNAL MEDICINE

## 2023-11-06 PROCEDURE — 94760 N-INVAS EAR/PLS OXIMETRY 1: CPT

## 2023-11-06 PROCEDURE — 0B968ZZ DRAINAGE OF RIGHT LOWER LOBE BRONCHUS, VIA NATURAL OR ARTIFICIAL OPENING ENDOSCOPIC: ICD-10-PCS | Performed by: INTERNAL MEDICINE

## 2023-11-06 PROCEDURE — 85014 HEMATOCRIT: CPT

## 2023-11-06 PROCEDURE — 94640 AIRWAY INHALATION TREATMENT: CPT

## 2023-11-06 PROCEDURE — 87102 FUNGUS ISOLATION CULTURE: CPT | Performed by: INTERNAL MEDICINE

## 2023-11-06 PROCEDURE — 82330 ASSAY OF CALCIUM: CPT

## 2023-11-06 PROCEDURE — 85007 BL SMEAR W/DIFF WBC COUNT: CPT | Performed by: INTERNAL MEDICINE

## 2023-11-06 PROCEDURE — 31624 DX BRONCHOSCOPE/LAVAGE: CPT | Performed by: INTERNAL MEDICINE

## 2023-11-06 PROCEDURE — 87147 CULTURE TYPE IMMUNOLOGIC: CPT | Performed by: INTERNAL MEDICINE

## 2023-11-06 PROCEDURE — 94003 VENT MGMT INPAT SUBQ DAY: CPT

## 2023-11-06 PROCEDURE — 87015 SPECIMEN INFECT AGNT CONCNTJ: CPT | Performed by: INTERNAL MEDICINE

## 2023-11-06 PROCEDURE — 83615 LACTATE (LD) (LDH) ENZYME: CPT | Performed by: INTERNAL MEDICINE

## 2023-11-06 PROCEDURE — 0B958ZZ DRAINAGE OF RIGHT MIDDLE LOBE BRONCHUS, VIA NATURAL OR ARTIFICIAL OPENING ENDOSCOPIC: ICD-10-PCS | Performed by: INTERNAL MEDICINE

## 2023-11-06 PROCEDURE — 88112 CYTOPATH CELL ENHANCE TECH: CPT | Performed by: SPECIALIST

## 2023-11-06 PROCEDURE — 83735 ASSAY OF MAGNESIUM: CPT | Performed by: INTERNAL MEDICINE

## 2023-11-06 PROCEDURE — 87206 SMEAR FLUORESCENT/ACID STAI: CPT | Performed by: INTERNAL MEDICINE

## 2023-11-06 PROCEDURE — 87116 MYCOBACTERIA CULTURE: CPT | Performed by: INTERNAL MEDICINE

## 2023-11-06 PROCEDURE — 36600 WITHDRAWAL OF ARTERIAL BLOOD: CPT

## 2023-11-06 PROCEDURE — 92526 ORAL FUNCTION THERAPY: CPT

## 2023-11-06 PROCEDURE — 99233 SBSQ HOSP IP/OBS HIGH 50: CPT | Performed by: STUDENT IN AN ORGANIZED HEALTH CARE EDUCATION/TRAINING PROGRAM

## 2023-11-06 PROCEDURE — 80053 COMPREHEN METABOLIC PANEL: CPT | Performed by: INTERNAL MEDICINE

## 2023-11-06 PROCEDURE — 87205 SMEAR GRAM STAIN: CPT | Performed by: INTERNAL MEDICINE

## 2023-11-06 PROCEDURE — 82947 ASSAY GLUCOSE BLOOD QUANT: CPT

## 2023-11-06 PROCEDURE — 94150 VITAL CAPACITY TEST: CPT

## 2023-11-06 PROCEDURE — 82803 BLOOD GASES ANY COMBINATION: CPT

## 2023-11-06 PROCEDURE — 93010 ELECTROCARDIOGRAM REPORT: CPT | Performed by: INTERNAL MEDICINE

## 2023-11-06 PROCEDURE — 87281 PNEUMOCYSTIS CARINII AG IF: CPT | Performed by: INTERNAL MEDICINE

## 2023-11-06 PROCEDURE — 84132 ASSAY OF SERUM POTASSIUM: CPT

## 2023-11-06 PROCEDURE — 85027 COMPLETE CBC AUTOMATED: CPT | Performed by: INTERNAL MEDICINE

## 2023-11-06 PROCEDURE — 84100 ASSAY OF PHOSPHORUS: CPT | Performed by: INTERNAL MEDICINE

## 2023-11-06 PROCEDURE — 99291 CRITICAL CARE FIRST HOUR: CPT | Performed by: INTERNAL MEDICINE

## 2023-11-06 PROCEDURE — 84550 ASSAY OF BLOOD/URIC ACID: CPT | Performed by: INTERNAL MEDICINE

## 2023-11-06 RX ORDER — LIDOCAINE HYDROCHLORIDE 10 MG/ML
INJECTION, SOLUTION EPIDURAL; INFILTRATION; INTRACAUDAL; PERINEURAL
Status: COMPLETED
Start: 2023-11-06 | End: 2023-11-06

## 2023-11-06 RX ORDER — MIDAZOLAM HYDROCHLORIDE 2 MG/2ML
2 INJECTION, SOLUTION INTRAMUSCULAR; INTRAVENOUS ONCE
Status: COMPLETED | OUTPATIENT
Start: 2023-11-06 | End: 2023-11-06

## 2023-11-06 RX ORDER — MAGNESIUM SULFATE HEPTAHYDRATE 40 MG/ML
2 INJECTION, SOLUTION INTRAVENOUS ONCE
Status: COMPLETED | OUTPATIENT
Start: 2023-11-06 | End: 2023-11-06

## 2023-11-06 RX ORDER — FENTANYL CITRATE 50 UG/ML
100 INJECTION, SOLUTION INTRAMUSCULAR; INTRAVENOUS ONCE
Status: COMPLETED | OUTPATIENT
Start: 2023-11-06 | End: 2023-11-06

## 2023-11-06 RX ADMIN — PREDNISONE 100 MG: 50 TABLET ORAL at 17:29

## 2023-11-06 RX ADMIN — CHLORHEXIDINE GLUCONATE 15 ML: 1.2 SOLUTION ORAL at 08:07

## 2023-11-06 RX ADMIN — IPRATROPIUM BROMIDE 0.5 MG: 0.5 SOLUTION RESPIRATORY (INHALATION) at 02:52

## 2023-11-06 RX ADMIN — OXYCODONE HYDROCHLORIDE 2.5 MG: 5 SOLUTION ORAL at 17:38

## 2023-11-06 RX ADMIN — NICOTINE 7 MG: 7 PATCH, EXTENDED RELEASE TRANSDERMAL at 08:10

## 2023-11-06 RX ADMIN — GABAPENTIN 300 MG: 300 CAPSULE ORAL at 21:47

## 2023-11-06 RX ADMIN — FOLIC ACID 1 MG: 1 TABLET ORAL at 08:07

## 2023-11-06 RX ADMIN — ACYCLOVIR 400 MG: 200 CAPSULE ORAL at 17:29

## 2023-11-06 RX ADMIN — QUETIAPINE FUMARATE 50 MG: 25 TABLET ORAL at 21:47

## 2023-11-06 RX ADMIN — SULFAMETHOXAZOLE AND TRIMETHOPRIM 1 TABLET: 800; 160 TABLET ORAL at 08:08

## 2023-11-06 RX ADMIN — OXYCODONE HYDROCHLORIDE 5 MG: 5 SOLUTION ORAL at 04:25

## 2023-11-06 RX ADMIN — BUSPIRONE HYDROCHLORIDE 30 MG: 10 TABLET ORAL at 08:08

## 2023-11-06 RX ADMIN — MIDAZOLAM 2 MG: 1 INJECTION INTRAMUSCULAR; INTRAVENOUS at 12:50

## 2023-11-06 RX ADMIN — IPRATROPIUM BROMIDE 0.5 MG: 0.5 SOLUTION RESPIRATORY (INHALATION) at 08:41

## 2023-11-06 RX ADMIN — OXYCODONE HYDROCHLORIDE 5 MG: 5 SOLUTION ORAL at 21:49

## 2023-11-06 RX ADMIN — FLUCONAZOLE 400 MG: 100 TABLET ORAL at 08:07

## 2023-11-06 RX ADMIN — METOPROLOL TARTRATE 25 MG: 25 TABLET, FILM COATED ORAL at 08:07

## 2023-11-06 RX ADMIN — BUDESONIDE 0.5 MG: 0.5 INHALANT ORAL at 08:41

## 2023-11-06 RX ADMIN — GABAPENTIN 300 MG: 300 CAPSULE ORAL at 17:29

## 2023-11-06 RX ADMIN — AMLODIPINE BESYLATE 10 MG: 5 TABLET ORAL at 08:08

## 2023-11-06 RX ADMIN — LEVALBUTEROL HYDROCHLORIDE 1.25 MG: 1.25 SOLUTION RESPIRATORY (INHALATION) at 02:52

## 2023-11-06 RX ADMIN — BUDESONIDE 0.5 MG: 0.5 INHALANT ORAL at 19:18

## 2023-11-06 RX ADMIN — BUSPIRONE HYDROCHLORIDE 30 MG: 10 TABLET ORAL at 17:29

## 2023-11-06 RX ADMIN — Medication 8.8 MG: at 21:47

## 2023-11-06 RX ADMIN — MELATONIN 3 MG: at 21:49

## 2023-11-06 RX ADMIN — LIDOCAINE HYDROCHLORIDE: 10 INJECTION, SOLUTION EPIDURAL; INFILTRATION; INTRACAUDAL; PERINEURAL at 13:14

## 2023-11-06 RX ADMIN — LEVALBUTEROL HYDROCHLORIDE 1.25 MG: 1.25 SOLUTION RESPIRATORY (INHALATION) at 19:18

## 2023-11-06 RX ADMIN — FUROSEMIDE 40 MG: 10 INJECTION, SOLUTION INTRAMUSCULAR; INTRAVENOUS at 08:09

## 2023-11-06 RX ADMIN — FORMOTEROL FUMARATE DIHYDRATE 20 MCG: 20 SOLUTION RESPIRATORY (INHALATION) at 19:18

## 2023-11-06 RX ADMIN — SERTRALINE 75 MG: 25 TABLET, FILM COATED ORAL at 08:08

## 2023-11-06 RX ADMIN — ETOPOSIDE: 20 INJECTION, SOLUTION INTRAVENOUS at 10:26

## 2023-11-06 RX ADMIN — ONDANSETRON: 2 INJECTION INTRAMUSCULAR; INTRAVENOUS at 09:27

## 2023-11-06 RX ADMIN — ACYCLOVIR 400 MG: 200 CAPSULE ORAL at 08:08

## 2023-11-06 RX ADMIN — LEVALBUTEROL HYDROCHLORIDE 1.25 MG: 1.25 SOLUTION RESPIRATORY (INHALATION) at 08:41

## 2023-11-06 RX ADMIN — BUSPIRONE HYDROCHLORIDE 30 MG: 10 TABLET ORAL at 21:47

## 2023-11-06 RX ADMIN — CHLORHEXIDINE GLUCONATE 15 ML: 1.2 SOLUTION ORAL at 21:47

## 2023-11-06 RX ADMIN — FAMOTIDINE 20 MG: 20 TABLET, FILM COATED ORAL at 17:29

## 2023-11-06 RX ADMIN — METOPROLOL TARTRATE 25 MG: 25 TABLET, FILM COATED ORAL at 21:49

## 2023-11-06 RX ADMIN — PREDNISONE 100 MG: 50 TABLET ORAL at 08:08

## 2023-11-06 RX ADMIN — IPRATROPIUM BROMIDE 0.5 MG: 0.5 SOLUTION RESPIRATORY (INHALATION) at 13:53

## 2023-11-06 RX ADMIN — ENOXAPARIN SODIUM 40 MG: 40 INJECTION SUBCUTANEOUS at 08:07

## 2023-11-06 RX ADMIN — MAGNESIUM SULFATE HEPTAHYDRATE 2 G: 40 INJECTION, SOLUTION INTRAVENOUS at 07:33

## 2023-11-06 RX ADMIN — IPRATROPIUM BROMIDE 0.5 MG: 0.5 SOLUTION RESPIRATORY (INHALATION) at 19:18

## 2023-11-06 RX ADMIN — FAMOTIDINE 20 MG: 20 TABLET, FILM COATED ORAL at 08:07

## 2023-11-06 RX ADMIN — FUROSEMIDE 40 MG: 10 INJECTION, SOLUTION INTRAMUSCULAR; INTRAVENOUS at 17:38

## 2023-11-06 RX ADMIN — FENTANYL CITRATE 100 MCG: 50 INJECTION INTRAMUSCULAR; INTRAVENOUS at 12:51

## 2023-11-06 RX ADMIN — GABAPENTIN 300 MG: 300 CAPSULE ORAL at 08:07

## 2023-11-06 RX ADMIN — LEVALBUTEROL HYDROCHLORIDE 1.25 MG: 1.25 SOLUTION RESPIRATORY (INHALATION) at 13:53

## 2023-11-06 RX ADMIN — FORMOTEROL FUMARATE DIHYDRATE 20 MCG: 20 SOLUTION RESPIRATORY (INHALATION) at 08:41

## 2023-11-06 RX ADMIN — MIDAZOLAM 2 MG: 1 INJECTION INTRAMUSCULAR; INTRAVENOUS at 13:19

## 2023-11-06 NOTE — PROGRESS NOTES
[address] [facility]  Progress Note  Name: Chiquis Merida  MRN: [id number]  Unit/Bed#: ICU 03 I Date of Admission: 9/13/2023   Date of Service: 11/6/2023 I Hospital Day: 47    Assessment/Plan   * Acute respiratory failure with hypoxia secondary to oropharyngeal mass  Assessment & Plan  Cuffless trach placed 9/17, transitioned to cuffed on 10/3. Oxygen requirements not improving. For mental health patient is on buspirone, quetiapine, and sertraline. For pain, gabapentin, oxycodone scheduled and prn, prn oxycodone mainly utilized for increased pain during chemo and after a bronch. On Guaifenesin, Atrovent and Xopenex for airway maintenance. No improvement in bilateral consolidation or atelectasis and groundglass opacities on repeat CT and chest x-rays. Bronchial cx with 3 colonies CoNS. PCP PCR invalid, repeat negative. CTCAP 10/12:  indicates additional groundglass opacity with paraseptal emphysema, which may be contributing to inability to remain off vent. Off Vent since 10/23. Patient was transferred to floors 11/2, however desaturated to <50% spo2. No results for input(s): "PHART", "IOP8DCC", "PO2ART", "YOW2FQE", "PHVEN", "ZBB5SGH", "PO2VEN", "RYE6YJS", "Kizzy Pollen", "SOURCE" in the last 72 hours. Cuffless trach 9/17, cuffed Shiley XLT #7 10/3    Plan:  Completed 7 day course of cefepime and Vancomycin. Planned for bronchoscopy today. NPO since midnight. ID consult  RP 2 negative. Pending Fungitell, urine histoplasma antigen, Aspergillus galactomannan antigen and antibody, CMV PCR  May need bronch, however trach currently cuffless. Considering Chemo induced pneumonitis. Will discuss with heme onc. Patient on 100 prednisone BID  Continue trach collar during the day and at night.   May need pressure support at night  Wean down HFNC to a goal FiO2 of 50% to be discharged      Hyperkalemia  Assessment & Plan  Recent Labs     11/04/23  0524 11/04/23  1017 11/04/23  1742 11/05/23  8131 11/06/23  0545   K 6.6*   < > 5.3 5.5* 5.1   MG 2.1  --   --  1.9 1.8*    < > = values in this interval not displayed. Workup:  Etiology: Patient takes bactrim. Tumor lysis would be less likely since calcium is elevated rather than decreased. Uric acid is normal.   Pending morning labs  Will give another round of medical management if still elevated. Currently slightly above target range. Plan:  Trend potassium on daily BMP. 1 amp of D50 followed by 10 units IV regular insulin and IV lasix      Large cell lymphoma (HCC)  Assessment & Plan  Large B-cell lymphoma, stage II. First chemo cycle 9/22 4 days of R-EPOCH. 9/27 Rituxan. 9/28 Filgrastim. Received nivestym 300 mc 7 days dcd on 10/26      Prophylaxis:  Bactrim prophylaxis Monday Wednesday Friday,  Acyclovir 400 mg twice daily  Fluconazole 200 mg daily  Levofloxacin 250 mg daily  Lovenox 40 mg daily    9/22  4 days of R-EPOCH.  9/27 Rituxan  10/13 for [facility] cycle 2, which was done over 4 days  cytoxan on 10/19. Plan:  Receiving today R EPOCH day 4/4 of cycle 3. She will receive Cytoxan on 11/7. Oncology is following  Transfuse blood products as needed. Keep Hgb>7 and Plt>20 for chemo   See acute respiratory failure above    Oropharyngeal mass  Assessment & Plan  9/14 Neck/ soft tissue CT: Large enhancing soft tissue mass identified within the left neck involving multiple spaces. Centered within the left oropharynx with extensions as described above into multiple spaces. This results in significant airway compromise. 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 Small level 3/level 4 nodes are seen within the neck. Tracheostomy by ENT. Replaced on 9/26. H/o tobacco abuse, daily smoker. On nicotine while inpatient, confirmed with patient she needs nicotine patch. CT soft tissue neck shows reduced mass effect from 9/14 to 10/13.  Can consider reducing trach size if continues to improve    Plan:  ENT and heme onc following  As per speech therapist diet  Dysphagia 2. Continue parallel PEG tube feeds for nutrition. See acute respiratory failure and large B cell lymphoma above. RML pneumonia-resolved as of 9/22/2023  Assessment & Plan  9/13 CT PE study: Patchy consolidation right middle lobe, new from 8/25/2023, compatible with pneumonia. No leukocytosis, no fever/chills. Positive for sore throat, cough. New onset pneumonia after recent hospitalization and antibiotics treatment. 9/14: Bronchoscopy/ ABL. MRSA negative. ABL revealed 2+ Growth of Candida albicans, suspected contaminant. Pancytopenia due to chemotherapy (HCC)-resolved as of 11/3/2023  Assessment & Plan  Patient received 1 PRBC transfusion on 10/5. Hemoglobin 8.2 s/p 1 PRBC transfusion on 10/25. B/L is 12-14. No sites of bleeding, no black stools. Iron panel indicative of inflammatory anemia. Normal Vitamin B12 levels, negative hemolysis smear. Folate is low 5.5. Plan:  Trend hemoglobin and transfuse for <7. As per heme/onc transfuse irradiated and leukoreduced blood products. Trend platelets  Folic acid 1mg daily supplementation  As per my conversation with Heme/oncology attending, patient is expected to have chemotherapy associated pancytopenia. No further anemia w/u required. PO (acute kidney injury) (HCC)-resolved as of 9/21/2023  Assessment & Plan  Lab Results   Component Value Date    CREATININE 0.94 11/06/2023    CREATININE 1.12 11/05/2023    CREATININE 1.40 (H) 11/04/2023       Likely prerenal.  Second to poor oral intake versus poor urine output. Baseline creatinine 1 to 1.2. Cr at baseline  Plan:  Urinary retention protocol, Daily weights, I/O  Trend Cr daily    Blister (nonthermal) of left forearm, sequela  Assessment & Plan  Blisters of left upper extremity healing, no signs of infection, continue daily wound care.      Generalized abdominal pain  Assessment & Plan  Patient reports abdominal pain which is unchanged since 9/22 no guarding on exam. Takes Famotidine for GERD at home. Alk phos 10/2 145, 10/20 79. CTCAP reveals complex right upper pole renal lesion requiring possible outpatient MRI    Continue Famotidine  On bowel regimen with Senna and Miralax prn    (HFpEF) heart failure with preserved ejection fraction [facility])  Assessment & Plan  Wt Readings from Last 3 Encounters:   11/06/23 77.1 kg (169 lb 15.6 oz)   09/07/23 74.6 kg (164 lb 6.4 oz)   08/30/23 73.3 kg (161 lb 9.6 oz)     Lab Results   Component Value Date    LVEF 60 08/28/2023     (H) 10/28/2023     (H) 09/29/2023     Echo 8/28/23: LVEF 60%. Plan:  K > 4   Measure I/O      Ambulatory dysfunction  Assessment & Plan  2/2 Impacted by chronic pain syndrome + prolonged hospital stay. Continue OOB with PT. PT rec post acute rehab however reportedly Cannot get LTACH placement while getting chemo per CM  She is aware STR SNFs continue to follow and treatment can be done from a SNF level of care. PT would need to be on trach collar for at least 72 hours with no need for the vent. Aware that pt would need to be around 28% FiO2     Depression  Assessment & Plan  Patient on Zoloft 75 daily and Seroquel hs  Patient is depressed, multiple family/palliative discussions. patient is firm that she wants to live and to fight, she is just tired. Currently goal is disease treatment oriented. Full code. No SI/HI ideations. Palliative signed off, will reconsult if patient changes her mind regarding wishes. Chronic Superficial thrombophlebitis  Assessment & Plan  Right forearm soreness. RUE US: No acute or chronic deep vein thrombosis. 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 PO not severe enough to require renal dosing at this time, will continue to monitor and adjust dosing as needed.       Continue DVT ppx with Lovenox 40    CKD (chronic kidney disease) stage 3, GFR 30-59 ml/min [facility])  Assessment & Plan  Lab Results   Component Value Date    EGFR 61 11/06/2023 EGFR 49 11/05/2023    EGFR 38 11/04/2023    CREATININE 0.94 11/06/2023    CREATININE 1.12 11/05/2023    CREATININE 1.40 (H) 11/04/2023     Baseline Cr between 0.75-1.1  Initial HERIBERTO felt 2/2 prerenal azotemia 2/2 diuresis, reduced PO intake +/- ATN. Patient received 2 doses of Bumex IV 2 g as she had not been responding appropriately to IV 40 Lasix daily. Creatinine went up by 0.5 meeting criteria for an HERIBERTO. This may be prerenal intrinsic or postrenal.  Potential prerenal causes include reduced kidney perfusion due to lisinopril. Intrinsic can also be lisinopril due to ATN, AIN from chemo medications, TLS, crystaline nephropathy from acyclovir, rituxan nephrotoxicity, filgrastim glomerulonephritis. Post renal obstructive could be from urine retention from vincristine or cyclophosphamide bladder fibrosis. Suspect most likely due to medications as a combination of prerenal and intrinsic. FENa was 0.2%, UA shows no casts or signs of infection, urine eosinophils 0%. Patient likely has prerenal HERIBERTO from volume depletion. Received 2 L NS and 1 pRBC and cr went up by 0.07 still and Na and Cl went down, suspect that volume prevented further cr rise and free water excretion impaired by kidney function, allowing hyponatremia to increase. Creatinine increase is likely plateaued and went down the following day. Suspect that now that nephrotoxic medications have been stopped she responded well to Lasix and creatinine downtrended. HERIBERTO now resolved. Will be cautious about nephrotoxins and monitor as restarting EPOCH and ppx. Patient gets volume depleted and overloaded very easily. Especially from chemo. 10/19 Heriberto as cr went up by 0.3 in 48 hours from 0.82 on 10/17 to 1.22 on 10/19. Suspect this is prerenal hypovolemic, will see if patient improves with fluids. She gets overloaded easily so if no improvement suspect CRS again. 10/29 HERIBERTO cr went up by 0.3 again. She has 922 ml UOP in last 24 hours.  HERIBERTO likely from lasix 40mg IV given yesterday. She is not hypovolemic. Plan:   Continue to monitor UO/renal function. Avoid nephrotoxic agents  Continue to monitor a.m. BMP. Pain syndrome, chronic  Assessment & Plan  Home regimen: Oxycodone 5 mg BID, Tylenol 650 mg q8 prn. Gabapentin 600 mg TID. Medical marijuana. Lumbar radiculopathy and impaired ambulatory dysfunction secondary to pain. Has bowel regimen and is having bowel movements daily. Patient required additional pain management during previous cycle and has some additional pain from tunneled line placement    Plan:  Continue gabapentin 300 mg TID, oxycodone 5 mg TID, prn Tylenol 650 mg q6. Oxycodone 5mg every 8 hours  Oxycodone 2.5 mg q6 PRN    Benign essential hypertension  Assessment & Plan  Home regimen Coreg 12.5 mg BID, Amlodipine 10 mg daily, and lisinopril 40 mg. All discontinued at this time secondary to hypotension and PO since cycle 1. but stable currently without any antihypertensives. Systolic persistently elevated and tachycardic, potentially secondary to pain. Patient was more hypotensive during last chemo. Coreg contraindicated during chemo, since cycles are every other week, would be better to stick with lopressor during duration of therapy as opposed to switching constantly. Plan:  Monitor vitals. Continue Norvasc 10 and lopressor 25 bid  Prn hydralazine for SBP > 160    Angioedema-resolved as of 11/4/2023  Assessment & Plan  POA in [facility] ED: Swollen tongue, soft and hard palate with severe angioedema. Decadron 10 mg, Benadryl 25 mg given. 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 Plan:  Cuffless trach 9/17, cuffed Shiley XLT #7 10/3  See acute respiratory failure and oropharyngeal mass above  Continue to wean vent.      Hyperlipidemia-resolved as of 10/26/2023  Assessment & Plan  Lab Results   Component Value Date    CHOLESTEROL 118 10/09/2023    HDL 14 (L) 10/09/2023    TRIG 144 10/09/2023    [address] [facility] 104 10/09/2023 Continue outpatient diet and lifestyle modification. COPD without exacerbation (HCC)-resolved as of 9/26/2023  Assessment & Plan  Continue vent with nebs. Wean off vent. Encephalopathy-resolved as of 9/21/2023  Assessment & Plan  9/19- Pt appeared to be AAO x3. Improving. ICU Core Measures     Vented Patient  VAP Bundle  VAP bundle ordered      A: Assess, Prevent, and Manage Pain Has pain been assessed? Yes  Need for changes to pain regimen? No   B: Both Spontaneous Awakening Trials (SATs) and Spontaneous Breathing Trials (SBTs) Plan to perform spontaneous awakening trial today? N/A       C: Choice of Sedation RASS Goal: 0 Alert and Calm or N/A patient not on sedation  Need for changes to sedation or analgesia regimen? No   D: Delirium CAM-ICU: Negative   E: Early Mobility  Plan for early mobility? Yes   F: Family Engagement Plan for family engagement today? Yes          Antibiotic Review: Continue broad spectrum secondary to severity of illness. Review of Invasive Devices:      Central access plan: Medications requiring central line      Prophylaxis:  VTE VTE covered by:  enoxaparin, Subcutaneous, 40 mg at 11/06/23 0807       Stress Ulcer  covered byfamotidine (PEPCID) tablet 20 mg [[id number]]       Subjective   HPI/24hr events: She is on 50L at 60% FiO2. No overnight events reported. She was initially planned for bronchoscopy yesterday but was deferred till today as she had eaten. NPO since midnight. Review of Systems   Constitutional:  Negative for chills and fever. HENT:  Negative for ear pain and sore throat. Eyes:  Negative for pain and visual disturbance. Respiratory:  Negative for shortness of breath. Cardiovascular:  Negative for chest pain and palpitations. Gastrointestinal:  Negative for abdominal pain and vomiting. Genitourinary:  Negative for dysuria and hematuria. Musculoskeletal:  Negative for arthralgias. Skin:  Negative for color change and rash. Neurological:  Negative for seizures and syncope. All other systems reviewed and are negative. Objective                            Vitals I/O      Most Recent Min/Max in 24hrs   Temp 98.7 °F (37.1 °C) Temp  Min: 97.6 °F (36.4 °C)  Max: 98.7 °F (37.1 °C)   Pulse 97 Pulse  Min: 92  Max: 113   Resp 18 Resp  Min: 18  Max: 18   /76 BP  Min: 133/71  Max: 170/86   O2 Sat 95 % SpO2  Min: 89 %  Max: 98 %      Intake/Output Summary (Last 24 hours) at 11/6/2023 0833  Last data filed at 11/6/2023 0737  Gross per 24 hour   Intake 1334 ml   Output 1075 ml   Net 259 ml         Diet NPO     Invasive Monitoring Physical exam    Physical Exam  Eyes:      Pupils: Pupils are equal, round, and reactive to light. HENT:      Head: Normocephalic and atraumatic. Nose: No congestion. Neck:      Comments: Trach collar  Cardiovascular:      Rate and Rhythm: Normal rate and regular rhythm. Abdominal:      General: There is no distension. Comments: PEG intact for Tube Feedings    Constitutional:       General: She is not in acute distress. Appearance: She is well-developed and well-nourished. She is ill-appearing. Pulmonary:      Effort: Pulmonary effort is normal.      Breath sounds: Normal breath sounds. Neurological:      General: No focal deficit present. Mental Status: She is alert and oriented to person, place and time.            Diagnostic Studies      EKG: NSR  Imaging: No recent imaging      Medications:  Scheduled PRN   acyclovir, 400 mg, BID  amLODIPine, 10 mg, Daily  budesonide, 0.5 mg, Q12H  busPIRone, 30 mg, TID  chlorhexidine, 15 mL, Q12H [address]  [START ON 11/7/2023] cyclophosphamide (CYTOXAN) 1,350 mg in sodium chloride 0.9 % 250 mL IVPB, 750 mg/m2 (Treatment Plan Recorded), Once  DOXOrubicin (ADRIAMYCIN) 18 mg, etoposide (TOPOSAR) 67.6 mg, vinCRIStine (ONCOVIN) 0.7 mg in sodium chloride 0.9 % 500 mL infusion, , Q24H  enoxaparin, 40 mg, Q24H ASHLEY  famotidine, 20 mg, BID  fentanyl citrate (PF), 100 mcg, Once  fluconazole, 794 mg, Daily  folic acid, 1 mg, Daily  formoterol, 20 mcg, Q12H  furosemide, 40 mg, BID (diuretic)  gabapentin, 300 mg, TID  levalbuterol, 1.25 mg, Q6H   And  ipratropium, 0.5 mg, Q6H  melatonin, 3 mg, HS  metoprolol tartrate, 25 mg, Q12H ASHLEY  midazolam, 2 mg, Once  nicotine, 7 mg, Daily  ondansetron (ZOFRAN) 16 mg, dexamethasone (DECADRON) 10 mg in sodium chloride 0.9 % 50 mL IVPB, , Q24H  oxyCODONE, 5 mg, Q8H  predniSONE, 60 mg/m2 (Treatment Plan Recorded), BID With Meals  QUEtiapine, 50 mg, HS  senna, 8.8 mg, HS  sertraline, 75 mg, Daily  sulfamethoxazole-trimethoprim, 1 tablet, Once per day on Mon Wed Fri      alteplase, 2 mg, Q1MIN PRN  alteplase, 2 mg, Q1MIN PRN  ondansetron, 4 mg, Q8H PRN  oxyCODONE, 2.5 mg, Q6H PRN       Continuous          Labs:    CBC    Recent Labs     11/05/23  0632   WBC 21.97*   HGB 7.2*   HCT 23.6*      BANDSPCT 13*     BMP    Recent Labs     11/05/23 0434 11/06/23  0545   SODIUM 139 143   K 5.5* 5.1    107   CO2 29 29   AGAP 5 7   BUN 61* 60*   CREATININE 1.12 0.94   CALCIUM 9.5 9.1       Coags    No recent results     Additional Electrolytes  Recent Labs     11/04/23  1017 11/05/23 0434 11/06/23  0545   MG  --  1.9 1.8*   PHOS  --  3.7 3.2   CAIONIZED 1.34*  --   --           Blood Gas    No recent results  No recent results LFTs  Recent Labs     11/05/23 0434 11/06/23  0545   ALT 24 34   AST 19 25   ALKPHOS 80 74   ALB 3.3* 3.2*   TBILI 0.21 0.19*       Infectious  No recent results  Glucose  Recent Labs     11/05/23 0434 11/06/23  0545   GLUC 144* 173*               Jose Alfredo Sosa MD

## 2023-11-06 NOTE — ASSESSMENT & PLAN NOTE
Cuffless trach placed 9/17, transitioned to cuffed on 10/3. Oxygen requirements not improving. For mental health patient is on buspirone, quetiapine, and sertraline. For pain, gabapentin, oxycodone scheduled and prn, prn oxycodone mainly utilized for increased pain during chemo and after a bronch. On Guaifenesin, Atrovent and Xopenex for airway maintenance. No improvement in bilateral consolidation or atelectasis and groundglass opacities on repeat CT and chest x-rays. Bronchial cx with 3 colonies CoNS. PCP PCR invalid, repeat negative. CTCAP 10/12:  indicates additional groundglass opacity with paraseptal emphysema, which may be contributing to inability to remain off vent. Off Vent since 10/23. Patient was transferred to floors 11/2, however desaturated to <50% spo2. No results for input(s): "PHART", "UPJ8ZJS", "PO2ART", "BFI0CRW", "PHVEN", "VJD0FHJ", "PO2VEN", "TWK3BXD", "Cynda Skeans", "SOURCE" in the last 72 hours. Cuffless trach 9/17, cuffed Shiley XLT #7 10/3    Plan:  Completed 7 day course of cefepime and Vancomycin. Planned for bronchoscopy today. NPO since midnight. ID consult  RP 2 negative. Pending Fungitell, urine histoplasma antigen, Aspergillus galactomannan antigen and antibody, CMV PCR  May need bronch, however trach currently cuffless. Considering Chemo induced pneumonitis. Will discuss with heme onc. Patient on 100 prednisone BID  Continue trach collar during the day and at night.   May need pressure support at night  Wean down HFNC to a goal FiO2 of 50% to be discharged

## 2023-11-06 NOTE — SPEECH THERAPY NOTE
Speech Language/Pathology    Speech/Language Pathology Progress Note    Patient Name: Marva FRANKLINTE'S Date: 11/6/2023     Problem List  Principal Problem:    Acute respiratory failure with hypoxia secondary to oropharyngeal mass  Active Problems:    Benign essential hypertension    Pain syndrome, chronic    CKD (chronic kidney disease) stage 3, GFR 30-59 ml/min (HCC)    (HFpEF) heart failure with preserved ejection fraction (HCC)    Ambulatory dysfunction    Oropharyngeal mass    Blister (nonthermal) of left forearm, sequela    Large cell lymphoma (HCC)    Chronic Superficial thrombophlebitis    Generalized abdominal pain    Depression    Hyperkalemia     TT rec'd from RN, pt's family wanted to discuss possibly diet upgrade. Pt was NPO today for bronch. Currently on vent. Pt is asking for water or try advancing liquids. I discussed reluctance to advance diet or trial NTL or thin liquids due to pt with fluctuating pulm status requiring HF and vent, since goal is for pt to remain off HF to go to rehab. VBS on 10/22 showed aspiration (silent) with NT and thin liquids. Dtr expressed frustration of pt and family of not progressing with medical and pulm status, including lack of improvement w/ diet. Dr. Mar Staples also present and discussed current medical status and barriers. Dtr and pt stated understanding but remained frustrated. Pt requested ice chips,which are allowed, pt was provided w/ ice chips- observed with a few. Pt then asked if she can have ice pops or ital ice, which will not be allowed, with rationale given. Rec  cont dysphagia 2 w/ honey thick liquids- per pt, po intake has not improved w/ bolus TF adjustment.    Will cont to follow      Gretchen Boo CCC-SLP  Speech Pathologist  Available via  tiger text

## 2023-11-06 NOTE — ASSESSMENT & PLAN NOTE
Lab Results   Component Value Date    EGFR 61 11/06/2023    EGFR 49 11/05/2023    EGFR 38 11/04/2023    CREATININE 0.94 11/06/2023    CREATININE 1.12 11/05/2023    CREATININE 1.40 (H) 11/04/2023     Baseline Cr between 0.75-1.1  Initial HERIBERTO felt 2/2 prerenal azotemia 2/2 diuresis, reduced PO intake +/- ATN. Patient received 2 doses of Bumex IV 2 g as she had not been responding appropriately to IV 40 Lasix daily. Creatinine went up by 0.5 meeting criteria for an HERIBERTO. This may be prerenal intrinsic or postrenal.  Potential prerenal causes include reduced kidney perfusion due to lisinopril. Intrinsic can also be lisinopril due to ATN, AIN from chemo medications, TLS, crystaline nephropathy from acyclovir, rituxan nephrotoxicity, filgrastim glomerulonephritis. Post renal obstructive could be from urine retention from vincristine or cyclophosphamide bladder fibrosis. Suspect most likely due to medications as a combination of prerenal and intrinsic. FENa was 0.2%, UA shows no casts or signs of infection, urine eosinophils 0%. Patient likely has prerenal HERIBERTO from volume depletion. Received 2 L NS and 1 pRBC and cr went up by 0.07 still and Na and Cl went down, suspect that volume prevented further cr rise and free water excretion impaired by kidney function, allowing hyponatremia to increase. Creatinine increase is likely plateaued and went down the following day. Suspect that now that nephrotoxic medications have been stopped she responded well to Lasix and creatinine downtrended. HERIBERTO now resolved. Will be cautious about nephrotoxins and monitor as restarting EPOCH and ppx. Patient gets volume depleted and overloaded very easily. Especially from chemo. 10/19 Heriberto as cr went up by 0.3 in 48 hours from 0.82 on 10/17 to 1.22 on 10/19. Suspect this is prerenal hypovolemic, will see if patient improves with fluids. She gets overloaded easily so if no improvement suspect CRS again. 10/29 HERIBERTO cr went up by 0.3 again. She has 922 ml UOP in last 24 hours. PO likely from lasix 40mg IV given yesterday. She is not hypovolemic. Plan:   Continue to monitor UO/renal function. Avoid nephrotoxic agents  Continue to monitor a.m. BMP.

## 2023-11-06 NOTE — ASSESSMENT & PLAN NOTE
Lab Results   Component Value Date    CHOLESTEROL 118 10/09/2023    HDL 14 (L) 10/09/2023    TRIG 144 10/09/2023    [address] 104 10/09/2023     Continue outpatient diet and lifestyle modification.

## 2023-11-06 NOTE — PLAN OF CARE
Problem: MOBILITY - ADULT  Goal: Maintain or return to baseline ADL function  Description: INTERVENTIONS:  -  Assess patient's ability to carry out ADLs; assess patient's baseline for ADL function and identify physical deficits which impact ability to perform ADLs (bathing, care of mouth/teeth, toileting, grooming, dressing, etc.)  - Assess/evaluate cause of self-care deficits   - Assess range of motion  - Assess patient's mobility; develop plan if impaired  - Assess patient's need for assistive devices and provide as appropriate  - Encourage maximum independence but intervene and supervise when necessary  - Involve family in performance of ADLs  - Assess for home care needs following discharge   - Consider OT consult to assist with ADL evaluation and planning for discharge  - Provide patient education as appropriate  Outcome: Progressing  Goal: Maintains/Returns to pre admission functional level  Description: INTERVENTIONS:  - Perform BMAT or MOVE assessment daily.   - Set and communicate daily mobility goal to care team and patient/family/caregiver.    - Collaborate with rehabilitation services on mobility goals if consulted  - Out of bed for toileting  - Record patient progress and toleration of activity level   Outcome: Progressing     Problem: Prexisting or High Potential for Compromised Skin Integrity  Goal: Skin integrity is maintained or improved  Description: INTERVENTIONS:  - Identify patients at risk for skin breakdown  - Assess and monitor skin integrity  - Assess and monitor nutrition and hydration status  - Monitor labs   - Assess for incontinence   - Turn and reposition patient  - Assist with mobility/ambulation  - Relieve pressure over bony prominences  - Avoid friction and shearing  - Provide appropriate hygiene as needed including keeping skin clean and dry  - Evaluate need for skin moisturizer/barrier cream  - Collaborate with interdisciplinary team   - Patient/family teaching  - Consider wound care consult   Outcome: Progressing     Problem: Nutrition/Hydration-ADULT  Goal: Nutrient/Hydration intake appropriate for improving, restoring or maintaining nutritional needs  Description: Monitor and assess patient's nutrition/hydration status for malnutrition. Collaborate with interdisciplinary team and initiate plan and interventions as ordered. Monitor patient's weight and dietary intake as ordered or per policy. Utilize nutrition screening tool and intervene as necessary. Determine patient's food preferences and provide high-protein, high-caloric foods as appropriate.      INTERVENTIONS:  - Monitor oral intake, urinary output, labs, and treatment plans  - Assess nutrition and hydration status and recommend course of action  - Evaluate amount of meals eaten  - Assist patient with eating if necessary   - Allow adequate time for meals  - Recommend/ encourage appropriate diets, oral nutritional supplements, and vitamin/mineral supplements  - Order, calculate, and assess calorie counts as needed  - Recommend, monitor, and adjust tube feedings and TPN/PPN based on assessed needs  - Assess need for intravenous fluids  - Provide specific nutrition/hydration education as appropriate  - Include patient/family/caregiver in decisions related to nutrition  Outcome: Progressing     Problem: PAIN - ADULT  Goal: Verbalizes/displays adequate comfort level or baseline comfort level  Description: Interventions:  - Encourage patient to monitor pain and request assistance  - Assess pain using appropriate pain scale  - Administer analgesics based on type and severity of pain and evaluate response  - Implement non-pharmacological measures as appropriate and evaluate response  - Consider cultural and social influences on pain and pain management  - Notify physician/advanced practitioner if interventions unsuccessful or patient reports new pain  Outcome: Progressing     Problem: INFECTION - ADULT  Goal: Absence or prevention of progression during hospitalization  Description: INTERVENTIONS:  - Assess and monitor for signs and symptoms of infection  - Monitor lab/diagnostic results  - Monitor all insertion sites, i.e. indwelling lines, tubes, and drains  - Monitor endotracheal if appropriate and nasal secretions for changes in amount and color  - [place] appropriate cooling/warming therapies per order  - Administer medications as ordered  - Instruct and encourage patient and family to use good hand hygiene technique  - Identify and instruct in appropriate isolation precautions for identified infection/condition  Outcome: Progressing  Goal: Absence of fever/infection during neutropenic period  Description: INTERVENTIONS:  - Monitor WBC    Outcome: Progressing     Problem: SAFETY ADULT  Goal: Maintain or return to baseline ADL function  Description: INTERVENTIONS:  -  Assess patient's ability to carry out ADLs; assess patient's baseline for ADL function and identify physical deficits which impact ability to perform ADLs (bathing, care of mouth/teeth, toileting, grooming, dressing, etc.)  - Assess/evaluate cause of self-care deficits   - Assess range of motion  - Assess patient's mobility; develop plan if impaired  - Assess patient's need for assistive devices and provide as appropriate  - Encourage maximum independence but intervene and supervise when necessary  - Involve family in performance of ADLs  - Assess for home care needs following discharge   - Consider OT consult to assist with ADL evaluation and planning for discharge  - Provide patient education as appropriate  Outcome: Progressing  Goal: Maintains/Returns to pre admission functional level  Description: INTERVENTIONS:  - Perform BMAT or MOVE assessment daily.   - Set and communicate daily mobility goal to care team and patient/family/caregiver.    - Collaborate with rehabilitation services on mobility goals if consulted  - Out of bed for toileting  - Record patient progress and toleration of activity level   Outcome: Progressing  Goal: Patient will remain free of falls  Description: INTERVENTIONS:  - Educate patient/family on patient safety including physical limitations  - Instruct patient to call for assistance with activity   - Consult OT/PT to assist with strengthening/mobility   - Keep Call bell within reach  - Keep bed low and locked with side rails adjusted as appropriate  - Keep care items and personal belongings within reach  - Initiate and maintain comfort rounds  - Apply yellow socks and bracelet for high fall risk patients  - Consider moving patient to room near nurses station  Outcome: Progressing     Problem: DISCHARGE PLANNING  Goal: Discharge to home or other facility with appropriate resources  Description: INTERVENTIONS:  - Identify barriers to discharge w/patient and caregiver  - Arrange for needed discharge resources and transportation as appropriate  - Identify discharge learning needs (meds, wound care, etc.)  - Arrange for interpretive services to assist at discharge as needed  - Refer to Case Management Department for coordinating discharge planning if the patient needs post-hospital services based on physician/advanced practitioner order or complex needs related to functional status, cognitive ability, or social support system  Outcome: Progressing     Problem: Knowledge Deficit  Goal: Patient/family/caregiver demonstrates understanding of disease process, treatment plan, medications, and discharge instructions  Description: Complete learning assessment and assess knowledge base.   Interventions:  - Provide teaching at level of understanding  - Provide teaching via preferred learning methods  Outcome: Progressing     Problem: RESPIRATORY - ADULT  Goal: Achieves optimal ventilation and oxygenation  Description: INTERVENTIONS:  - Assess for changes in respiratory status  - Assess for changes in mentation and behavior  - Position to facilitate oxygenation and minimize respiratory effort  - Oxygen administered by appropriate delivery if ordered  - Initiate smoking cessation education as indicated  - Encourage broncho-pulmonary hygiene including cough, deep breathe, Incentive Spirometry  - Assess the need for suctioning and aspirate as needed  - Assess and instruct to report SOB or any respiratory difficulty  - Respiratory Therapy support as indicated  Outcome: Progressing     Problem: GENITOURINARY - ADULT  Goal: Maintains or returns to baseline urinary function  Description: INTERVENTIONS:  - Assess urinary function  - Encourage oral fluids to ensure adequate hydration if ordered  - Administer IV fluids as ordered to ensure adequate hydration  - Administer ordered medications as needed  - Offer frequent toileting  - Follow urinary retention protocol if ordered  Outcome: Progressing  Goal: Absence of urinary retention  Description: INTERVENTIONS:  - Assess patient’s ability to void and empty bladder  - Monitor I/O  - Bladder scan as needed  - Discuss with physician/AP medications to alleviate retention as needed  - Discuss catheterization for long term situations as appropriate  Outcome: Progressing     Problem: METABOLIC, FLUID AND ELECTROLYTES - ADULT  Goal: Electrolytes maintained within normal limits  Description: INTERVENTIONS:  - Monitor labs and assess patient for signs and symptoms of electrolyte imbalances  - Administer electrolyte replacement as ordered  - Monitor response to electrolyte replacements, including repeat lab results as appropriate  - Instruct patient on fluid and nutrition as appropriate  Outcome: Progressing  Goal: Fluid balance maintained  Description: INTERVENTIONS:  - Monitor labs   - Monitor I/O and WT  - Instruct patient on fluid and nutrition as appropriate  - Assess for signs & symptoms of volume excess or deficit  Outcome: Progressing  Goal: Glucose maintained within target range  Description: INTERVENTIONS:  - Monitor Blood Glucose as ordered  - Assess for signs and symptoms of hyperglycemia and hypoglycemia  - Administer ordered medications to maintain glucose within target range  - Assess nutritional intake and initiate nutrition service referral as needed  Outcome: Progressing

## 2023-11-06 NOTE — ASSESSMENT & PLAN NOTE
Recent Labs     11/04/23  0524 11/04/23  1017 11/04/23  1742 11/05/23  0434 11/06/23  0545   K 6.6*   < > 5.3 5.5* 5.1   MG 2.1  --   --  1.9 1.8*    < > = values in this interval not displayed. Workup:  Etiology: Patient takes bactrim. Tumor lysis would be less likely since calcium is elevated rather than decreased. Uric acid is normal.   Pending morning labs  Will give another round of medical management if still elevated. Currently slightly above target range. Plan:  Trend potassium on daily BMP.   1 amp of D50 followed by 10 units IV regular insulin and IV lasix

## 2023-11-06 NOTE — ASSESSMENT & PLAN NOTE
Wt Readings from Last 3 Encounters:   11/06/23 77.1 kg (169 lb 15.6 oz)   09/07/23 74.6 kg (164 lb 6.4 oz)   08/30/23 73.3 kg (161 lb 9.6 oz)     Lab Results   Component Value Date    LVEF 60 08/28/2023     (H) 10/28/2023     (H) 09/29/2023     Echo 8/28/23: LVEF 60%.        Plan:  K > 4   Measure I/O

## 2023-11-06 NOTE — ASSESSMENT & PLAN NOTE
Lab Results   Component Value Date    CREATININE 0.94 11/06/2023    CREATININE 1.12 11/05/2023    CREATININE 1.40 (H) 11/04/2023       Likely prerenal.  Second to poor oral intake versus poor urine output. Baseline creatinine 1 to 1.2.     Cr at baseline  Plan:  Urinary retention protocol, Daily weights, I/O  Trend Cr daily

## 2023-11-06 NOTE — PLAN OF CARE
Problem: MOBILITY - ADULT  Goal: Maintain or return to baseline ADL function  Description: INTERVENTIONS:  -  Assess patient's ability to carry out ADLs; assess patient's baseline for ADL function and identify physical deficits which impact ability to perform ADLs (bathing, care of mouth/teeth, toileting, grooming, dressing, etc.)  - Assess/evaluate cause of self-care deficits   - Assess range of motion  - Assess patient's mobility; develop plan if impaired  - Assess patient's need for assistive devices and provide as appropriate  - Encourage maximum independence but intervene and supervise when necessary  - Involve family in performance of ADLs  - Assess for home care needs following discharge   - Consider OT consult to assist with ADL evaluation and planning for discharge  - Provide patient education as appropriate  Outcome: Progressing  Goal: Maintains/Returns to pre admission functional level  Description: INTERVENTIONS:  - Perform BMAT or MOVE assessment daily.   - Set and communicate daily mobility goal to care team and patient/family/caregiver.    - Collaborate with rehabilitation services on mobility goals if consulted  - Out of bed for toileting  - Record patient progress and toleration of activity level   Outcome: Progressing     Problem: Prexisting or High Potential for Compromised Skin Integrity  Goal: Skin integrity is maintained or improved  Description: INTERVENTIONS:  - Identify patients at risk for skin breakdown  - Assess and monitor skin integrity  - Assess and monitor nutrition and hydration status  - Monitor labs   - Assess for incontinence   - Turn and reposition patient  - Assist with mobility/ambulation  - Relieve pressure over bony prominences  - Avoid friction and shearing  - Provide appropriate hygiene as needed including keeping skin clean and dry  - Evaluate need for skin moisturizer/barrier cream  - Collaborate with interdisciplinary team   - Patient/family teaching  - Consider wound care consult   Outcome: Progressing     Problem: Nutrition/Hydration-ADULT  Goal: Nutrient/Hydration intake appropriate for improving, restoring or maintaining nutritional needs  Description: Monitor and assess patient's nutrition/hydration status for malnutrition. Collaborate with interdisciplinary team and initiate plan and interventions as ordered. Monitor patient's weight and dietary intake as ordered or per policy. Utilize nutrition screening tool and intervene as necessary. Determine patient's food preferences and provide high-protein, high-caloric foods as appropriate.      INTERVENTIONS:  - Monitor oral intake, urinary output, labs, and treatment plans  - Assess nutrition and hydration status and recommend course of action  - Evaluate amount of meals eaten  - Assist patient with eating if necessary   - Allow adequate time for meals  - Recommend/ encourage appropriate diets, oral nutritional supplements, and vitamin/mineral supplements  - Order, calculate, and assess calorie counts as needed  - Recommend, monitor, and adjust tube feedings and TPN/PPN based on assessed needs  - Assess need for intravenous fluids  - Provide specific nutrition/hydration education as appropriate  - Include patient/family/caregiver in decisions related to nutrition  Outcome: Progressing     Problem: PAIN - ADULT  Goal: Verbalizes/displays adequate comfort level or baseline comfort level  Description: Interventions:  - Encourage patient to monitor pain and request assistance  - Assess pain using appropriate pain scale  - Administer analgesics based on type and severity of pain and evaluate response  - Implement non-pharmacological measures as appropriate and evaluate response  - Consider cultural and social influences on pain and pain management  - Notify physician/advanced practitioner if interventions unsuccessful or patient reports new pain  Outcome: Progressing     Problem: INFECTION - ADULT  Goal: Absence or prevention of progression during hospitalization  Description: INTERVENTIONS:  - Assess and monitor for signs and symptoms of infection  - Monitor lab/diagnostic results  - Monitor all insertion sites, i.e. indwelling lines, tubes, and drains  - Monitor endotracheal if appropriate and nasal secretions for changes in amount and color  - [place] appropriate cooling/warming therapies per order  - Administer medications as ordered  - Instruct and encourage patient and family to use good hand hygiene technique  - Identify and instruct in appropriate isolation precautions for identified infection/condition  Outcome: Progressing  Goal: Absence of fever/infection during neutropenic period  Description: INTERVENTIONS:  - Monitor WBC    Outcome: Progressing     Problem: SAFETY ADULT  Goal: Maintain or return to baseline ADL function  Description: INTERVENTIONS:  -  Assess patient's ability to carry out ADLs; assess patient's baseline for ADL function and identify physical deficits which impact ability to perform ADLs (bathing, care of mouth/teeth, toileting, grooming, dressing, etc.)  - Assess/evaluate cause of self-care deficits   - Assess range of motion  - Assess patient's mobility; develop plan if impaired  - Assess patient's need for assistive devices and provide as appropriate  - Encourage maximum independence but intervene and supervise when necessary  - Involve family in performance of ADLs  - Assess for home care needs following discharge   - Consider OT consult to assist with ADL evaluation and planning for discharge  - Provide patient education as appropriate  Outcome: Progressing  Goal: Maintains/Returns to pre admission functional level  Description: INTERVENTIONS:  - Perform BMAT or MOVE assessment daily.   - Set and communicate daily mobility goal to care team and patient/family/caregiver.    - Out of bed for toileting  - Record patient progress and toleration of activity level   Outcome: Progressing  Goal: Patient will remain free of falls  Description: INTERVENTIONS:  - Educate patient/family on patient safety including physical limitations  - Instruct patient to call for assistance with activity   - Consult OT/PT to assist with strengthening/mobility   - Keep Call bell within reach  - Apply yellow socks and bracelet for high fall risk patients  - Consider moving patient to room near nurses station  Outcome: Progressing     Problem: DISCHARGE PLANNING  Goal: Discharge to home or other facility with appropriate resources  Description: INTERVENTIONS:  - Identify barriers to discharge w/patient and caregiver  - Arrange for needed discharge resources and transportation as appropriate  - Identify discharge learning needs (meds, wound care, etc.)  - Arrange for interpretive services to assist at discharge as needed  - Refer to Case Management Department for coordinating discharge planning if the patient needs post-hospital services based on physician/advanced practitioner order or complex needs related to functional status, cognitive ability, or social support system  Outcome: Progressing     Problem: Knowledge Deficit  Goal: Patient/family/caregiver demonstrates understanding of disease process, treatment plan, medications, and discharge instructions  Description: Complete learning assessment and assess knowledge base.   Interventions:  - Provide teaching at level of understanding  - Provide teaching via preferred learning methods  Outcome: Progressing     Problem: RESPIRATORY - ADULT  Goal: Achieves optimal ventilation and oxygenation  Description: INTERVENTIONS:  - Assess for changes in respiratory status  - Assess for changes in mentation and behavior  - Position to facilitate oxygenation and minimize respiratory effort  - Oxygen administered by appropriate delivery if ordered  - Initiate smoking cessation education as indicated  - Encourage broncho-pulmonary hygiene including cough, deep breathe, Incentive Spirometry  - Assess the need for suctioning and aspirate as needed  - Assess and instruct to report SOB or any respiratory difficulty  - Respiratory Therapy support as indicated  Outcome: Progressing     Problem: GENITOURINARY - ADULT  Goal: Maintains or returns to baseline urinary function  Description: INTERVENTIONS:  - Assess urinary function  - Encourage oral fluids to ensure adequate hydration if ordered  - Administer IV fluids as ordered to ensure adequate hydration  - Administer ordered medications as needed  - Offer frequent toileting  - Follow urinary retention protocol if ordered  Outcome: Progressing  Goal: Absence of urinary retention  Description: INTERVENTIONS:  - Assess patient’s ability to void and empty bladder  - Monitor I/O  - Bladder scan as needed  - Discuss with physician/AP medications to alleviate retention as needed  - Discuss catheterization for long term situations as appropriate  Outcome: Progressing     Problem: METABOLIC, FLUID AND ELECTROLYTES - ADULT  Goal: Electrolytes maintained within normal limits  Description: INTERVENTIONS:  - Monitor labs and assess patient for signs and symptoms of electrolyte imbalances  - Administer electrolyte replacement as ordered  - Monitor response to electrolyte replacements, including repeat lab results as appropriate  - Instruct patient on fluid and nutrition as appropriate  Outcome: Progressing  Goal: Fluid balance maintained  Description: INTERVENTIONS:  - Monitor labs   - Monitor I/O and WT  - Instruct patient on fluid and nutrition as appropriate  - Assess for signs & symptoms of volume excess or deficit  Outcome: Progressing  Goal: Glucose maintained within target range  Description: INTERVENTIONS:  - Monitor Blood Glucose as ordered  - Assess for signs and symptoms of hyperglycemia and hypoglycemia  - Administer ordered medications to maintain glucose within target range  - Assess nutritional intake and initiate nutrition service referral as needed  Outcome: Progressing

## 2023-11-06 NOTE — RESPIRATORY THERAPY NOTE
Bronched pt with pulmonary fellow, attending, RT and RN at bedside. 8mL 1% lidocaine used. Brushed right lower lobe. BAL of RML, 150 mL saline instilled, 60mL saline brought back. Samples taken to lab. Pt currently on vent on documented settings.

## 2023-11-06 NOTE — ASSESSMENT & PLAN NOTE
Large B-cell lymphoma, stage II. First chemo cycle 9/22 4 days of R-EPOCH. 9/27 Rituxan. 9/28 Filgrastim. Received nivestym 300 mc 7 days dcd on 10/26      Prophylaxis:  Bactrim prophylaxis Monday Wednesday Friday,  Acyclovir 400 mg twice daily  Fluconazole 200 mg daily  Levofloxacin 250 mg daily  Lovenox 40 mg daily    9/22  4 days of R-EPOCH.  9/27 Rituxan  10/13 for [facility] cycle 2, which was done over 4 days  cytoxan on 10/19. Plan:  Receiving today R EPOCH day 4/4 of cycle 3. She will receive Cytoxan on 11/7. Oncology is following  Transfuse blood products as needed.   Keep Hgb>7 and Plt>20 for chemo   See acute respiratory failure above

## 2023-11-06 NOTE — ASSESSMENT & PLAN NOTE
POA in [place] ([place]) ED: Swollen tongue, soft and hard palate with severe angioedema. Decadron 10 mg, Benadryl 25 mg given. 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 Plan:  Cuffless trach 9/17, cuffed [place] XLT #7 10/3  See acute respiratory failure and oropharyngeal mass above  Continue to wean vent.

## 2023-11-06 NOTE — SPEECH THERAPY NOTE
Speech Language/Pathology    Speech/Language Pathology Cancel Note    Patient Name: Irene Ley  ZSGCA'O Date: 11/6/2023       Pt NPO for bronch, will follow up as able/as appropriate for diet tolerance.      Gretchen Boo CCC-SLP  Speech Pathologist  Available via  tiger text

## 2023-11-06 NOTE — PROGRESS NOTES
Progress Note - Infectious Disease   Claire Ray 77 y.o. female MRN: [id number]  Unit/Bed#: ICU 03 Encounter: [id number]      Impression/Plan:    1. Acute hypoxic respiratory failure. Present on admission 9/13/2023. Patient found to have an obstructing soft tissue mass in the neck. Status post trach and biopsy 9/17/2023 and pathology was consistent with diffuse large B-cell lymphoma. The patient has undergone 2 cycles of chemotherapy with R-EPOCH. Over the last week, the patient had worsening hypoxia despite broad-spectrum antibiotics with vancomycin and cefepime. CT chest with persistent severe bilateral alveolar interstitial opacities. Sputum culture growing mixed respiratory stacy. The patient is afebrile and despite hypoxia appears relatively well. Low concern for typical bacterial infection given imaging findings and lack of response to antibiotics. Could consider an atypical viral or fungal infection however the patient has been in the hospital for >50 days so has not had very recent environmental exposure and she has been on antimicrobial prophylaxis with acyclovir, fluconazole, Bactrim since starting on chemotherapy. Low concern for PJP given continuous Bactrim prophylaxis, appearance of chest imaging, normal LDH. CMV PCR, RP2 negative. Consideration for a viral or drug-induced pneumonitis, ARDS. Status post bronchoscopy today with thin secretions noted              -Continue antimicrobial prophylaxis as below, monitor off other antimicrobials              -Okay with high-dose steroids per ICU              -Follow up Fungitell, urine histoplasma antigen, Aspergillus galactomannan antigen and antibody              -follow up bronchoscopy studies               -Additional supportive care per critical care team     2. Diffuse large B-cell lymphoma. Diagnosed this admission after the patient presented with an obstructing neck mass.   Confirmed on biopsy 9/17 and the patient underwent trach placement. Status post 2 cycles of R-EPOCH, last on 10/20/2023 and started cycle 3 11/3                        -Oncology follow-up              -Continue antimicrobial prophylaxis per oncology with Bactrim, fluconazole, acyclovir              -Monitor for TLS     3. Oropharyngeal mass. Present on admission. Status post trach placement and biopsy . Pathology consistent with diffuse large B-cell lymphoma.              -Trach management per ICU     4. PO on CKD. Creatinine fluctuating this admission, may be ATN related to chemo medications and volume shifts. -Monitor creatinine              -Nephrotoxic agents              -Dose adjust antimicrobials for creatinine clearance     I have discussed the above management plan in detail with the patient at bedside. ID will see again once bronchoscopy studies result, please call with questions. Antibiotics:  Ppx Acyclovir, fluconazole, TMP-SMX    Subjective: The patient is feeling okay today. Denies fever, chills, not feeling short of breath. She does endorse cough. WBC count downtrending. Objective:  Vitals:  Temp:  [97.6 °F (36.4 °C)-98.7 °F (37.1 °C)] 98.3 °F (36.8 °C)  HR:  [] 102  Resp:  [18-37] 37  BP: (135-148)/(63-78) 146/73  SpO2:  [91 %-100 %] 99 %  Temp (24hrs), Av.4 °F (36.9 °C), Min:97.6 °F (36.4 °C), Max:98.7 °F (37.1 °C)  Current: Temperature: 98.3 °F (36.8 °C)    Physical Exam:   General Appearance:  Alert, interactive, nontoxic, no acute distress. Throat: Oropharynx moist without lesions. Trach in place   Lungs:   Decreased breath sounds bilaterally, scattered crackles   Heart:  RRR; no murmur, rub or gallop   Abdomen:   Soft, non-tender, non-distended, positive bowel sounds. +PEG     Extremities: No clubbing, cyanosis or edema   Skin: No new rashes or lesions. No draining wounds noted. Labs:    All pertinent labs and imaging studies were personally reviewed  Results from last 7 days   Lab Units 11/06/23  1322 11/06/23  0545 11/05/23  0632 11/04/23  0524   WBC Thousand/uL  --  12.27* 21.97* 22.85*   HEMOGLOBIN g/dL  --  7.1* 7.2* 8.0*   I STAT HEMOGLOBIN g/dl 7.5*  --   --   --    PLATELETS Thousands/uL  --  405* 368 357     Results from last 7 days   Lab Units 11/06/23  1322 11/06/23  0545 11/05/23  0434 11/04/23  1017 11/04/23  0524   SODIUM mmol/L  --  143 139  --  137   POTASSIUM mmol/L  --  5.1 5.5*   < > 6.6*   CHLORIDE mmol/L  --  107 105  --  105   CO2 mmol/L  --  29 29  --  29   CO2, I-STAT mmol/L 31  --   --   --   --    BUN mg/dL  --  60* 61*  --  57*   CREATININE mg/dL  --  0.94 1.12  --  1.40*   EGFR ml/min/1.73sq m  --  61 49  --  38   GLUCOSE, ISTAT mg/dl 139  --   --   --   --    CALCIUM mg/dL  --  9.1 9.5  --  9.9   AST U/L  --  25 19  --  15   ALT U/L  --  34 24  --  19   ALK PHOS U/L  --  74 80  --  89    < > = values in this interval not displayed.          Imaging:  CT chest-persistent severe bilateral alveolar interstitial opacities

## 2023-11-07 LAB
ALBUMIN SERPL BCP-MCNC: 3.6 G/DL (ref 3.5–5)
ALP SERPL-CCNC: 72 U/L (ref 34–104)
ALT SERPL W P-5'-P-CCNC: 34 U/L (ref 7–52)
ANION GAP SERPL CALCULATED.3IONS-SCNC: 11 MMOL/L
ANISOCYTOSIS BLD QL SMEAR: PRESENT
AST SERPL W P-5'-P-CCNC: 19 U/L (ref 13–39)
BASO STIPL BLD QL SMEAR: PRESENT
BASOPHILS # BLD MANUAL: 0 THOUSAND/UL (ref 0–0.1)
BASOPHILS NFR MAR MANUAL: 0 % (ref 0–1)
BILIRUB SERPL-MCNC: 0.37 MG/DL (ref 0.2–1)
BUN SERPL-MCNC: 62 MG/DL (ref 5–25)
CALCIUM SERPL-MCNC: 10.1 MG/DL (ref 8.4–10.2)
CHLORIDE SERPL-SCNC: 106 MMOL/L (ref 96–108)
CO2 SERPL-SCNC: 29 MMOL/L (ref 21–32)
CREAT SERPL-MCNC: 1.09 MG/DL (ref 0.6–1.3)
EOSINOPHIL # BLD MANUAL: 0 THOUSAND/UL (ref 0–0.4)
EOSINOPHIL NFR BLD MANUAL: 0 % (ref 0–6)
ERYTHROCYTE [DISTWIDTH] IN BLOOD BY AUTOMATED COUNT: 16.1 % (ref 11.6–15.1)
GFR SERPL CREATININE-BSD FRML MDRD: 51 ML/MIN/1.73SQ M
GLUCOSE SERPL-MCNC: 134 MG/DL (ref 65–140)
GLUCOSE SERPL-MCNC: 136 MG/DL (ref 65–140)
HCT VFR BLD AUTO: 25.5 % (ref 34.8–46.1)
HGB BLD-MCNC: 7.8 G/DL (ref 11.5–15.4)
HYPERCHROMIA BLD QL SMEAR: PRESENT
LDH SERPL-CCNC: 204 U/L (ref 140–271)
LYMPHOCYTES # BLD AUTO: 1.78 THOUSAND/UL (ref 0.6–4.47)
LYMPHOCYTES # BLD AUTO: 8 % (ref 14–44)
MCH RBC QN AUTO: 30.1 PG (ref 26.8–34.3)
MCHC RBC AUTO-ENTMCNC: 30.6 G/DL (ref 31.4–37.4)
MCV RBC AUTO: 99 FL (ref 82–98)
METAMYELOCYTES NFR BLD MANUAL: 16 % (ref 0–1)
MONOCYTES # BLD AUTO: 2.9 THOUSAND/UL (ref 0–1.22)
MONOCYTES NFR BLD: 13 % (ref 4–12)
MYELOCYTES NFR BLD MANUAL: 7 % (ref 0–1)
NEUTROPHILS # BLD MANUAL: 11.82 THOUSAND/UL (ref 1.85–7.62)
NEUTS BAND NFR BLD MANUAL: 13 % (ref 0–8)
NEUTS SEG NFR BLD AUTO: 40 % (ref 43–75)
NRBC BLD AUTO-RTO: 0 /100 WBCS
NRBC BLD AUTO-RTO: 4 /100 WBC (ref 0–2)
PATHOLOGY REVIEW: YES
PHOSPHATE SERPL-MCNC: 5.2 MG/DL (ref 2.3–4.1)
PLATELET # BLD AUTO: 441 THOUSANDS/UL (ref 149–390)
PLATELET BLD QL SMEAR: ADEQUATE
PMV BLD AUTO: 9.1 FL (ref 8.9–12.7)
POLYCHROMASIA BLD QL SMEAR: PRESENT
POTASSIUM SERPL-SCNC: 4.9 MMOL/L (ref 3.5–5.3)
PROMYELOCYTES NFR BLD MANUAL: 3 % (ref 0–0)
PROT SERPL-MCNC: 6.6 G/DL (ref 6.4–8.4)
RBC # BLD AUTO: 2.59 MILLION/UL (ref 3.81–5.12)
RBC MORPH BLD: PRESENT
RHODAMINE-AURAMINE STN SPEC: NORMAL
RHODAMINE-AURAMINE STN SPEC: NORMAL
SODIUM SERPL-SCNC: 146 MMOL/L (ref 135–147)
URATE SERPL-MCNC: 6.7 MG/DL (ref 2–7.5)
WBC # BLD AUTO: 7.14 THOUSAND/UL (ref 4.31–10.16)
WBC TOXIC VACUOLES BLD QL SMEAR: PRESENT

## 2023-11-07 PROCEDURE — 84100 ASSAY OF PHOSPHORUS: CPT | Performed by: INTERNAL MEDICINE

## 2023-11-07 PROCEDURE — 85027 COMPLETE CBC AUTOMATED: CPT

## 2023-11-07 PROCEDURE — 83615 LACTATE (LD) (LDH) ENZYME: CPT | Performed by: INTERNAL MEDICINE

## 2023-11-07 PROCEDURE — 84550 ASSAY OF BLOOD/URIC ACID: CPT | Performed by: INTERNAL MEDICINE

## 2023-11-07 PROCEDURE — 3E04305 INTRODUCTION OF OTHER ANTINEOPLASTIC INTO CENTRAL VEIN, PERCUTANEOUS APPROACH: ICD-10-PCS | Performed by: INTERNAL MEDICINE

## 2023-11-07 PROCEDURE — 99232 SBSQ HOSP IP/OBS MODERATE 35: CPT | Performed by: NURSE PRACTITIONER

## 2023-11-07 PROCEDURE — NC001 PR NO CHARGE: Performed by: NURSE PRACTITIONER

## 2023-11-07 PROCEDURE — NC001 PR NO CHARGE: Performed by: INTERNAL MEDICINE

## 2023-11-07 PROCEDURE — 80053 COMPREHEN METABOLIC PANEL: CPT

## 2023-11-07 PROCEDURE — 94760 N-INVAS EAR/PLS OXIMETRY 1: CPT

## 2023-11-07 PROCEDURE — 94640 AIRWAY INHALATION TREATMENT: CPT

## 2023-11-07 PROCEDURE — 82948 REAGENT STRIP/BLOOD GLUCOSE: CPT

## 2023-11-07 PROCEDURE — 99291 CRITICAL CARE FIRST HOUR: CPT | Performed by: INTERNAL MEDICINE

## 2023-11-07 PROCEDURE — 94003 VENT MGMT INPAT SUBQ DAY: CPT

## 2023-11-07 RX ORDER — SODIUM CHLORIDE 9 MG/ML
20 INJECTION, SOLUTION INTRAVENOUS ONCE
Status: COMPLETED | OUTPATIENT
Start: 2023-11-08 | End: 2023-11-08

## 2023-11-07 RX ORDER — ACETAMINOPHEN 325 MG/1
650 TABLET ORAL ONCE
Status: COMPLETED | OUTPATIENT
Start: 2023-11-08 | End: 2023-11-08

## 2023-11-07 RX ADMIN — OXYCODONE HYDROCHLORIDE 2.5 MG: 5 SOLUTION ORAL at 08:55

## 2023-11-07 RX ADMIN — NICOTINE 7 MG: 7 PATCH, EXTENDED RELEASE TRANSDERMAL at 08:33

## 2023-11-07 RX ADMIN — OXYCODONE HYDROCHLORIDE 5 MG: 5 SOLUTION ORAL at 04:47

## 2023-11-07 RX ADMIN — PREDNISONE 100 MG: 50 TABLET ORAL at 08:31

## 2023-11-07 RX ADMIN — CHLORHEXIDINE GLUCONATE 15 ML: 1.2 SOLUTION ORAL at 22:53

## 2023-11-07 RX ADMIN — CHLORHEXIDINE GLUCONATE 15 ML: 1.2 SOLUTION ORAL at 08:32

## 2023-11-07 RX ADMIN — FORMOTEROL FUMARATE DIHYDRATE 20 MCG: 20 SOLUTION RESPIRATORY (INHALATION) at 07:46

## 2023-11-07 RX ADMIN — ACYCLOVIR 400 MG: 200 CAPSULE ORAL at 08:32

## 2023-11-07 RX ADMIN — GABAPENTIN 300 MG: 300 CAPSULE ORAL at 17:47

## 2023-11-07 RX ADMIN — FAMOTIDINE 20 MG: 20 TABLET, FILM COATED ORAL at 17:47

## 2023-11-07 RX ADMIN — IPRATROPIUM BROMIDE 0.5 MG: 0.5 SOLUTION RESPIRATORY (INHALATION) at 07:46

## 2023-11-07 RX ADMIN — OXYCODONE HYDROCHLORIDE 5 MG: 5 SOLUTION ORAL at 22:52

## 2023-11-07 RX ADMIN — BUSPIRONE HYDROCHLORIDE 30 MG: 10 TABLET ORAL at 17:47

## 2023-11-07 RX ADMIN — METOPROLOL TARTRATE 25 MG: 25 TABLET, FILM COATED ORAL at 08:31

## 2023-11-07 RX ADMIN — BUSPIRONE HYDROCHLORIDE 30 MG: 10 TABLET ORAL at 08:57

## 2023-11-07 RX ADMIN — METOPROLOL TARTRATE 25 MG: 25 TABLET, FILM COATED ORAL at 22:52

## 2023-11-07 RX ADMIN — AMLODIPINE BESYLATE 10 MG: 5 TABLET ORAL at 08:30

## 2023-11-07 RX ADMIN — IPRATROPIUM BROMIDE 0.5 MG: 0.5 SOLUTION RESPIRATORY (INHALATION) at 14:30

## 2023-11-07 RX ADMIN — LEVALBUTEROL HYDROCHLORIDE 1.25 MG: 1.25 SOLUTION RESPIRATORY (INHALATION) at 02:08

## 2023-11-07 RX ADMIN — MELATONIN 3 MG: at 22:52

## 2023-11-07 RX ADMIN — FAMOTIDINE 20 MG: 20 TABLET, FILM COATED ORAL at 08:30

## 2023-11-07 RX ADMIN — CYCLOPHOSPHAMIDE 1350 MG: 1 INJECTION, POWDER, FOR SOLUTION INTRAVENOUS; ORAL at 10:45

## 2023-11-07 RX ADMIN — LEVALBUTEROL HYDROCHLORIDE 1.25 MG: 1.25 SOLUTION RESPIRATORY (INHALATION) at 14:30

## 2023-11-07 RX ADMIN — QUETIAPINE FUMARATE 50 MG: 25 TABLET ORAL at 22:52

## 2023-11-07 RX ADMIN — ONDANSETRON: 2 INJECTION INTRAMUSCULAR; INTRAVENOUS at 10:08

## 2023-11-07 RX ADMIN — SODIUM CHLORIDE 2000 ML: 0.9 INJECTION, SOLUTION INTRAVENOUS at 08:19

## 2023-11-07 RX ADMIN — IPRATROPIUM BROMIDE 0.5 MG: 0.5 SOLUTION RESPIRATORY (INHALATION) at 19:35

## 2023-11-07 RX ADMIN — GABAPENTIN 300 MG: 300 CAPSULE ORAL at 08:31

## 2023-11-07 RX ADMIN — BUSPIRONE HYDROCHLORIDE 30 MG: 10 TABLET ORAL at 22:54

## 2023-11-07 RX ADMIN — FORMOTEROL FUMARATE DIHYDRATE 20 MCG: 20 SOLUTION RESPIRATORY (INHALATION) at 19:35

## 2023-11-07 RX ADMIN — SERTRALINE 75 MG: 25 TABLET, FILM COATED ORAL at 08:31

## 2023-11-07 RX ADMIN — FOLIC ACID 1 MG: 1 TABLET ORAL at 08:31

## 2023-11-07 RX ADMIN — ENOXAPARIN SODIUM 40 MG: 40 INJECTION SUBCUTANEOUS at 08:30

## 2023-11-07 RX ADMIN — BUDESONIDE 0.5 MG: 0.5 INHALANT ORAL at 19:34

## 2023-11-07 RX ADMIN — OXYCODONE HYDROCHLORIDE 5 MG: 5 SOLUTION ORAL at 13:13

## 2023-11-07 RX ADMIN — FLUCONAZOLE 400 MG: 100 TABLET ORAL at 08:30

## 2023-11-07 RX ADMIN — LEVALBUTEROL HYDROCHLORIDE 1.25 MG: 1.25 SOLUTION RESPIRATORY (INHALATION) at 07:46

## 2023-11-07 RX ADMIN — BUDESONIDE 0.5 MG: 0.5 INHALANT ORAL at 07:46

## 2023-11-07 RX ADMIN — ACYCLOVIR 400 MG: 200 CAPSULE ORAL at 17:44

## 2023-11-07 RX ADMIN — LEVALBUTEROL HYDROCHLORIDE 1.25 MG: 1.25 SOLUTION RESPIRATORY (INHALATION) at 19:35

## 2023-11-07 RX ADMIN — GABAPENTIN 300 MG: 300 CAPSULE ORAL at 22:52

## 2023-11-07 RX ADMIN — IPRATROPIUM BROMIDE 0.5 MG: 0.5 SOLUTION RESPIRATORY (INHALATION) at 02:08

## 2023-11-07 NOTE — ASSESSMENT & PLAN NOTE
POA in [facility] ED: Swollen tongue, soft and hard palate with severe angioedema. Decadron 10 mg, Benadryl 25 mg given. 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 Plan:  Cuffless trach 9/17, cuffed Shiley XLT #7 10/3  See acute respiratory failure and oropharyngeal mass above  Continue to wean vent.

## 2023-11-07 NOTE — ASSESSMENT & PLAN NOTE
Wt Readings from Last 3 Encounters:   11/07/23 78.9 kg (173 lb 15.1 oz)   09/07/23 74.6 kg (164 lb 6.4 oz)   08/30/23 73.3 kg (161 lb 9.6 oz)     Lab Results   Component Value Date    LVEF 60 08/28/2023     (H) 10/28/2023     (H) 09/29/2023     Echo 8/28/23: LVEF 60%.        Plan:  K > 4   Measure I/O

## 2023-11-07 NOTE — ASSESSMENT & PLAN NOTE
Lab Results   Component Value Date    CREATININE 1.09 11/07/2023    CREATININE 0.94 11/06/2023    CREATININE 1.12 11/05/2023       Likely prerenal.  Second to poor oral intake versus poor urine output. Baseline creatinine 1 to 1.2.     Cr at baseline  Plan:  Urinary retention protocol, Daily weights, I/O  Trend Cr daily

## 2023-11-07 NOTE — ASSESSMENT & PLAN NOTE
Cuffless trach placed 9/17, transitioned to cuffed on 10/3. Oxygen requirements not improving. For mental health patient is on buspirone, quetiapine, and sertraline. For pain, gabapentin, oxycodone scheduled and prn, prn oxycodone mainly utilized for increased pain during chemo and after a bronch. On Guaifenesin, Atrovent and Xopenex for airway maintenance. No improvement in bilateral consolidation or atelectasis and groundglass opacities on repeat CT and chest x-rays. Bronchial cx with 3 colonies CoNS. PCP PCR invalid, repeat negative. CTCAP 10/12:  indicates additional groundglass opacity with paraseptal emphysema, which may be contributing to inability to remain off vent. Bronchoscopy performed and unremarkable. Samples sent to lab for culture. Patient was placed back on vent s/p procedure. Now on high flow. Completed 7 day course of cefepime and Vancomycin. Bronchial culture and gram stain negative. CMV, AFB and Pneumocystis jiroveci (carinii) negative. Cuffless trach was placed 9/17. Replaced with a cuffed Shiley XLT #7 10/3    Plan:  ID consult  Pending Fungitell, urine histoplasma antigen, Aspergillus galactomannan antigen and antibody. Considering Chemo induced pneumonitis. Will discuss with heme onc. Patient on 100 prednisone BID  Continue trach collar during the day and at night. Continue her on dysphagia 2 diet. Wean down HFNC to a goal FiO2 of 50% to be discharged  Palliative on board to discuss goals of care.

## 2023-11-07 NOTE — PROGRESS NOTES
Progress Note - Medical Oncology:  Melissa Esquivel 77 y.o. female MRN: [id number]  Unit/Bed#: ICU 03 Encounter: [id number]     Assessment and Plan:  1. 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 Initiated Cycle 3 of R-EPOCH on Friday, November 3rd (Presently on Cycle 3 Day 5). Will complete Cycle 3, including administration of Rituximab, while inpatient (Due to ongoing placement issues). Plan for repeat PET-CT on hospital discharge to evaluate for disease-response. Patient expressed understanding and agreement with the plan. 1. Initiate Cycle 3 Day 1: R-EPOCH on Friday, November 3rd. 2. Plan for repeat PET-CT on hospital discharge to evaluate disease response. 2. Acute on Subacute Normocytic Anemia with Intermittent Transfusion Requirement:  Of note, patient has a subacute history of normocytic anemia dating back to September 2023. From September 2015 through August 2023, baseline hemoglobin was within the 13.0-15.6 gm/dL range. Hemoglobin now varies from 7.0-9.3 gm/dL; with a normal mean corpuscular volume, and elevated red cell distribution width. Ancillary-studies, as follows: Iron Panel: Iron: 17 Ferritin: 519 Percent Saturation: 10% TIBC: 165 Vitamin B12: 761 Folate: 5.5. Reticulocyte Index: 0.1. Patient has no evidence of obvious gastrointestinal, or genitourinary bleeding. She is not on oral antiplatelet therapy, or chronic anticoagulation (Note: She is presently on Lovenox 40 mg Daily for VTE-prophylaxis).  Highly-suspect acute on subacute anemia requiring blood product transfusion, is secondary to chemotherapy-induced myelosuppression superimposed on a background of anemia of chronic inflammation related to malignancy, with an additional component of folic acid deficiency. Would recommend supplementation with Folic Acid 1 mg Daily. Continue to transfuse at hemoglobin less than 7.0 gm/dL (Leukoreduced, and irradiated blood products only). Monitor counts daily. Patient is in agreement. 1. Supplement with Folic Acid 1 mg Daily. 2. Transfuse at Hemoglobin less than 7.0 gm/dL. A. Note: Please transfuse irradiated, and leukoreduced blood products. Subjective: Interval Events: Patient was seen and examined in the Intensive Care Unit. She was resting comfortably in the bedside chair on my initial arrival. No acute events noted, overnight; with the exception of ventilator-dependence post-diagnostic bronchoscopy yesterday (November 6th), with an isolated episode of desaturation. [facility] She is minimally participatory on my evaluation, and remains with her eyes closed during our encounter. She has no complaints or concerns, and is amenable to administration of Cycle 3 Day 5 of chemotherapy today. No further questions at this time. Objective:  Vital Signs:      Vitals:     10/26/23 1100   BP: 136/67   Pulse: 97   Resp: (!) 28   Temp: 98.7 °F (37.1 °C)   SpO2: 99%      Physical Exam:   General: Alert, and oriented; Chronically-Ill Appearing  HEENT: Atraumatic, and normocephalic; PERRLA; EOMI; Moist mucosal membranes  Neck: Trachea midline; No neck masses, thyromegaly, or cervical lymphadenopathy present on my exam  Cardiovascular: Regular rate and rhythm; No murmurs, rubs, or gallops appreciated; No peripheral edema  Respiratory: Diminished Breathsounds; Tracheostomy in Place  Abdomen: Soft, non-tender; Non-distended; No organomegaly appreciated; Bowel sounds present in 4-quadrants; Left Upper Quadrant PEG-Tube  Extremities: No obvious deformities; No peripheral edema;  Moves all  Neurologic: Appropriately alert, and oriented to Person, Place, and Time; No focal neurologic deficits     Lab, Imaging and other studies: Reviewed. Patient was seen and discussed with attending physician, Edison Kennedy M.D.     Jo Ha D.O.   Hematology-Oncology Fellow (PGY-4)

## 2023-11-07 NOTE — ASSESSMENT & PLAN NOTE
Cuffless trach placed 9/17, transitioned to cuffed on 10/3. Oxygen requirements not improving. For mental health patient is on buspirone, quetiapine, and sertraline. For pain, gabapentin, oxycodone scheduled and prn, prn oxycodone mainly utilized for increased pain during chemo and after a bronch. On Guaifenesin, Atrovent and Xopenex for airway maintenance. No improvement in bilateral consolidation or atelectasis and groundglass opacities on repeat CT and chest x-rays. Bronchial cx with 3 colonies CoNS. PCP PCR invalid, repeat negative. CTCAP 10/12:  indicates additional groundglass opacity with paraseptal emphysema, which may be contributing to inability to remain off vent. Off Vent since 10/23. Patient was transferred to floors 11/2, however desaturated to <50% spo2. Cuffless trach was placed 9/17. Replaced with a cuffed Shiley XLT #7 10/3    Plan:  Completed 7 day course of cefepime and Vancomycin. Planned for bronchoscopy today. NPO since midnight. ID consult  RP 2 negative. CMV PCR not detected  Pending Fungitell, urine histoplasma antigen, Aspergillus galactomannan antigen and antibody. May need bronch, however trach currently cuffless. Considering Chemo induced pneumonitis. Will discuss with heme onc. Patient on 100 prednisone BID  Continue trach collar during the day and at night.   May need pressure support at night  Wean down HFNC to a goal FiO2 of 50% to be discharged

## 2023-11-07 NOTE — NURSING NOTE
Attempted to restart bolus TF regimen. Patient refused, stating that it makes her feel too full. Resident notified.  Plan to check blood sugars overnight, and have nutrition reevaluate in AM.

## 2023-11-07 NOTE — PLAN OF CARE
Problem: MOBILITY - ADULT  Goal: Maintain or return to baseline ADL function  Description: INTERVENTIONS:  -  Assess patient's ability to carry out ADLs; assess patient's baseline for ADL function and identify physical deficits which impact ability to perform ADLs (bathing, care of mouth/teeth, toileting, grooming, dressing, etc.)  - Assess/evaluate cause of self-care deficits   - Assess range of motion  - Assess patient's mobility; develop plan if impaired  - Assess patient's need for assistive devices and provide as appropriate  - Encourage maximum independence but intervene and supervise when necessary  - Involve family in performance of ADLs  - Assess for home care needs following discharge   - Consider OT consult to assist with ADL evaluation and planning for discharge  - Provide patient education as appropriate  Outcome: Progressing     Problem: Prexisting or High Potential for Compromised Skin Integrity  Goal: Skin integrity is maintained or improved  Description: INTERVENTIONS:  - Identify patients at risk for skin breakdown  - Assess and monitor skin integrity  - Assess and monitor nutrition and hydration status  - Monitor labs   - Assess for incontinence   - Turn and reposition patient  - Assist with mobility/ambulation  - Relieve pressure over bony prominences  - Avoid friction and shearing  - Provide appropriate hygiene as needed including keeping skin clean and dry  - Evaluate need for skin moisturizer/barrier cream  - Collaborate with interdisciplinary team   - Patient/family teaching  - Consider wound care consult   Outcome: Progressing     Problem: Nutrition/Hydration-ADULT  Goal: Nutrient/Hydration intake appropriate for improving, restoring or maintaining nutritional needs  Description: Monitor and assess patient's nutrition/hydration status for malnutrition. Collaborate with interdisciplinary team and initiate plan and interventions as ordered.   Monitor patient's weight and dietary intake as ordered or per policy. Utilize nutrition screening tool and intervene as necessary. Determine patient's food preferences and provide high-protein, high-caloric foods as appropriate.      INTERVENTIONS:  - Monitor oral intake, urinary output, labs, and treatment plans  - Assess nutrition and hydration status and recommend course of action  - Evaluate amount of meals eaten  - Assist patient with eating if necessary   - Allow adequate time for meals  - Recommend/ encourage appropriate diets, oral nutritional supplements, and vitamin/mineral supplements  - Order, calculate, and assess calorie counts as needed  - Recommend, monitor, and adjust tube feedings and TPN/PPN based on assessed needs  - Assess need for intravenous fluids  - Provide specific nutrition/hydration education as appropriate  - Include patient/family/caregiver in decisions related to nutrition  Outcome: Progressing     Problem: INFECTION - ADULT  Goal: Absence or prevention of progression during hospitalization  Description: INTERVENTIONS:  - Assess and monitor for signs and symptoms of infection  - Monitor lab/diagnostic results  - Monitor all insertion sites, i.e. indwelling lines, tubes, and drains  - Monitor endotracheal if appropriate and nasal secretions for changes in amount and color  - [place] appropriate cooling/warming therapies per order  - Administer medications as ordered  - Instruct and encourage patient and family to use good hand hygiene technique  - Identify and instruct in appropriate isolation precautions for identified infection/condition  Outcome: Progressing     Problem: RESPIRATORY - ADULT  Goal: Achieves optimal ventilation and oxygenation  Description: INTERVENTIONS:  - Assess for changes in respiratory status  - Assess for changes in mentation and behavior  - Position to facilitate oxygenation and minimize respiratory effort  - Oxygen administered by appropriate delivery if ordered  - Initiate smoking cessation education as indicated  - Encourage broncho-pulmonary hygiene including cough, deep breathe, Incentive Spirometry  - Assess the need for suctioning and aspirate as needed  - Assess and instruct to report SOB or any respiratory difficulty  - Respiratory Therapy support as indicated  Outcome: Progressing     Problem: DISCHARGE PLANNING  Goal: Discharge to home or other facility with appropriate resources  Description: INTERVENTIONS:  - Identify barriers to discharge w/patient and caregiver  - Arrange for needed discharge resources and transportation as appropriate  - Identify discharge learning needs (meds, wound care, etc.)  - Arrange for interpretive services to assist at discharge as needed  - Refer to Case Management Department for coordinating discharge planning if the patient needs post-hospital services based on physician/advanced practitioner order or complex needs related to functional status, cognitive ability, or social support system  Outcome: Progressing

## 2023-11-07 NOTE — PROGRESS NOTES
Progress Note - Palliative & Supportive Care  Ju Jacome  77 y.o.  female  ICU 03/ICU 03   MRN: [id number]  Encounter: [id number]     Assessment  Acute respiratory failure with hypoxia  HFpEF  Large cell lymphoma  Oropharyngeal mass  CKD  COPD  Chronic pain syndrome  Ambulatory dysfunction  Goals of care counseling  Palliative care encounter    Plan:  Symptom management  Will defer symptom management to the ICU team at this time. 2.   Goals:  Level 1 code status  Disease focused care with no limits placed. .   When patient discusses going home she means with full care. She wishes to continue chemo and "get well."  Will continue discussions regarding [address] as patient's clinical presentation evolves. Patient's tolerance for in depth conversations is quite short and she ends conversation when she does not hear positive information. 3.  Social support:  Very frustrated  No longer trusting of providers  "Loves" the nursing team in the ICU  Will live with daughter when discharged  She is  from her spouse but he is still very involved in her life and assists with her care. He lives with her son. Supportive listening provided  Normalized experience of patient  Provided anxiety containment  Provided anticipatory guidance  Discussed spiritual needs    4. Follow up  Palliative Care will continue to follow and goals of care discussions will be ongoing. Please reach out via [employer] if questions or concerns arise. 5. Care Coordination  Reviewed case with ICU Dr. Tavares Call and Dr. Inge Eckert  Reviewed case with Juliann SILVA  Reviewed case with [facility] Alida Watkins    24 Hour History  Chart reviewed before visit. Patient seen today with no family present. Purpose of visit is to allow patient some time to express her frustrations and offer emotional support as her hospitalization has been long and difficult. Petrona Posey is very frustrated that she is not better.  She feels that she has gone through a lot with no improvement. [place] snot understand what the benefit of all of this has been. She does talk about going home but her goal is to go home with full care including continuing chemotherapy. Says "Please don't let me die after all of this." Currently, she says that she has no quality of life and the only thing that will improve her life is getting her out of here and home with her daughter. Patient states that she is sleeping better but the rest of her care and life is "unbearable."  The most bothersome thing to her is that she cannot have water. She says the new diet consisting of mechanical soft with honey thick liquids is "disgusting" and she does not understand how people would expect her to enjoy this diet. When I began to discuss the fact that she cannot have water she says if I am going to just tell her the same thing everybody else tells her (that she cannot have water) I can leave. She does give me unless it is positive or going to lead to her leaving the hospital.    Review of Systems   All other systems reviewed and are negative.       Medications    Current Facility-Administered Medications:     acyclovir (ZOVIRAX) capsule 400 mg, 400 mg, Oral, BID, RANDEE Chavarria, 400 mg at 11/07/23 0832    alteplase (CATHFLO) injection 2 mg, 2 mg, Intracatheter, Q1MIN PRN, RANDEE Willingham    alteplase (CATHFLO) injection 2 mg, 2 mg, Intracatheter, Q1MIN PRN, RANDEE Velazquez    amLODIPine (NORVASC) tablet 10 mg, 10 mg, Oral, Daily, RANDEE Chavarria, 10 mg at 11/07/23 0830    budesonide (PULMICORT) inhalation solution 0.5 mg, 0.5 mg, Nebulization, Q12H, RANDEE Chavarria, 0.5 mg at 11/07/23 0746    busPIRone (BUSPAR) tablet 30 mg, 30 mg, Oral, TID, RANDEE Chavarria, 30 mg at 11/07/23 0857    chlorhexidine (PERIDEX) 0.12 % oral rinse 15 mL, 15 mL, Mouth/Throat, Q12H ASHLEY, RANDEE Chavarria, 15 mL at 11/07/23 0832    enoxaparin (LOVENOX) subcutaneous injection 40 mg, 40 mg, Subcutaneous, Q24H [address], RANDEE Chavarria, 40 mg at 11/07/23 0830    famotidine (PEPCID) tablet 20 mg, 20 mg, Oral, BID, RANDEE Chavarria, 20 mg at 11/07/23 0830    fentanyl citrate (PF) 100 MCG/2ML 100 mcg, 100 mcg, Intravenous, Once, RANDEE Chavarria    fluconazole (DIFLUCAN) tablet 400 mg, 400 mg, Oral, Daily, RANDEE Chavarria, 400 mg at 02/25/04 0854    folic acid (FOLVITE) tablet 1 mg, 1 mg, Oral, Daily, RANDEE Chavarria, 1 mg at 11/07/23 0831    formoterol (PERFOROMIST) nebulizer solution 20 mcg, 20 mcg, Nebulization, Q12H, RANDEE Chavarria, 20 mcg at 11/07/23 0746    furosemide (LASIX) injection 40 mg, 40 mg, Intravenous, BID (diuretic), Cornelius Moreno MD, 40 mg at 11/06/23 1738    gabapentin (NEURONTIN) capsule 300 mg, 300 mg, Oral, TID, RANDEE Chavarria, 300 mg at 11/07/23 0831    levalbuterol (Verlee Peres) inhalation solution 1.25 mg, 1.25 mg, Nebulization, Q6H, 1.25 mg at 11/07/23 0746 **AND** ipratropium (ATROVENT) 0.02 % inhalation solution 0.5 mg, 0.5 mg, Nebulization, Q6H, RANDEE Chavarria, 0.5 mg at 11/07/23 0746    melatonin tablet 3 mg, 3 mg, Oral, HS, RANDEE Chavarria, 3 mg at 11/06/23 2149    metoprolol tartrate (LOPRESSOR) tablet 25 mg, 25 mg, Oral, Q12H [address], RANDEE Chavarria, 25 mg at 11/07/23 0831    midazolam (VERSED) injection 2 mg, 2 mg, Intravenous, Once, RANDEE Chavarria    [COMPLETED] nicotine (NICODERM CQ) 14 mg/24hr TD 24 hr patch 14 mg, 14 mg, Transdermal, Daily, 14 mg at 10/15/23 1002 **FOLLOWED BY** nicotine (NICODERM CQ) 7 mg/24hr TD 24 hr patch 7 mg, 7 mg, Transdermal, Daily, RANDEE Chavarria, 7 mg at 11/07/23 0833    ondansetron (ZOFRAN) injection 4 mg, 4 mg, Intravenous, Q8H PRN, RANDEE Chavarria, 4 mg at 11/01/23 1825    oxyCODONE (ROXICODONE) oral solution 2.5 mg, 2.5 mg, Oral, Q6H PRN, RANDEE Chavarria, 2.5 mg at 11/07/23 0855    oxyCODONE (ROXICODONE) oral solution 5 mg, 5 mg, Oral, Q8H, Maria Guadalupe L Gunner Rehman, 5 mg at 11/07/23 1313    predniSONE tablet 100 mg, 60 mg/m2 (Treatment Plan Recorded), Oral, BID With Meals, Maria Guadalupe QUIGLEY RANDEE Mercado, 100 mg at 11/07/23 0831    QUEtiapine (SEROquel) tablet 50 mg, 50 mg, Oral, HS, Maria Guadalupe QUIGLEY RANDEE Mercado, 50 mg at 11/06/23 2147    senna oral syrup 8.8 mg, 8.8 mg, Per NG Tube, HS, Maria Guadalupe QUIGLEY RANDEE Mercado, 8.8 mg at 11/06/23 2147    sertraline (ZOLOFT) tablet 75 mg, 75 mg, Per PEG Tube, Daily, Maria Guadalupe L RANDEE Mercado, 75 mg at 11/07/23 0831    sulfamethoxazole-trimethoprim (BACTRIM DS) 800-160 mg per tablet 1 tablet, 1 tablet, Oral, Once per day on Mon Wed Fri, RANDEE Chavarria, 1 tablet at 11/06/23 2406    Objective  /75 (BP Location: Left arm)   Pulse 83   Temp 98.8 °F (37.1 °C) (Oral)   Resp 20   Ht 5' 0.63" (1.54 m)   Wt 78.9 kg (173 lb 15.1 oz)   SpO2 98%   BMI 33.27 kg/m²   Physical Exam  Vitals and nursing note reviewed. Constitutional:       General: She is awake. She is not in acute distress. Appearance: She is ill-appearing. She is not toxic-appearing. HENT:      Head: Normocephalic and atraumatic. Right Ear: External ear normal.      Left Ear: External ear normal.   Eyes:      General: No scleral icterus. Right eye: No discharge. Left eye: No discharge. Extraocular Movements: Extraocular movements intact. Conjunctiva/sclera: Conjunctivae normal.      Pupils: Pupils are equal, round, and reactive to light. Cardiovascular:      Rate and Rhythm: Normal rate. Pulmonary:      Effort: Pulmonary effort is normal. No tachypnea, bradypnea, accessory muscle usage or respiratory distress. Abdominal:      General: There is no distension. Musculoskeletal:      Cervical back: Normal range of motion. Right lower leg: No edema. Left lower leg: No edema. Skin:     General: Skin is dry. Coloration: Skin is not pale.    Neurological:      Mental Status: She is alert and oriented to person, place, and time.      Cranial Nerves: No dysarthria or facial asymmetry. Psychiatric:         Attention and Perception: Attention normal.         Mood and Affect: Affect is angry. Speech: Speech normal.         Behavior: Behavior is agitated. Cognition and Memory: Cognition and memory normal.     Lab Results: I have personally reviewed pertinent labs. , CBC:   Lab Results   Component Value Date    WBC 7.14 11/07/2023    HGB 7.8 (L) 11/07/2023    HCT 25.5 (L) 11/07/2023    MCV 99 (H) 11/07/2023     (H) 11/07/2023    RBC 2.59 (L) 11/07/2023    MCH 30.1 11/07/2023    MCHC 30.6 (L) 11/07/2023    RDW 16.1 (H) 11/07/2023    MPV 9.1 11/07/2023    NRBC 0 11/07/2023   , CMP:   Lab Results   Component Value Date    SODIUM 146 11/07/2023    K 4.9 11/07/2023     11/07/2023    CO2 29 11/07/2023    BUN 62 (H) 11/07/2023    CREATININE 1.09 11/07/2023    CALCIUM 10.1 11/07/2023    AST 19 11/07/2023    ALT 34 11/07/2023    ALKPHOS 72 11/07/2023    EGFR 51 11/07/2023     Imaging Studies: I have personally reviewed pertinent reports. EKG, Pathology, and Other Studies: I have personally reviewed pertinent reports. Counseling / Coordination of Care  Total floor / unit time spent today 45 minutes. Greater than 50% of total time was spent with the patient and / or family counseling and / or coordinating of care. A description of the counseling / coordination of care: Chart reviewed, provided medical updates, determined goals of care, discussed palliative care and symptom management,  provided anticipatory guidance, determined competency and POA/HCA, determined social/family support, provided psychosocial support. Naman Lyn, MSN, CRNP, [facility]  Palliative & Supportive Care    Portions of this document may have been created using dictation software and as such some "sound alike" terms may have been generated by the system. Do not hesitate to contact me with any questions or clarifications.

## 2023-11-07 NOTE — ASSESSMENT & PLAN NOTE
Lab Results   Component Value Date    CHOLESTEROL 118 10/09/2023    HDL 14 (L) 10/09/2023    TRIG 144 10/09/2023    [address] [facility] [address] 10/09/2023     Continue outpatient diet and lifestyle modification.

## 2023-11-07 NOTE — ASSESSMENT & PLAN NOTE
Recent Labs     11/05/23  0434 11/06/23  0545 11/07/23  0433   K 5.5* 5.1 4.9   MG 1.9 1.8*  --        Workup:  Etiology: Patient takes bactrim. Tumor lysis would be less likely since calcium is elevated rather than decreased. Uric acid is normal.   K+ elevated but returned to normal after medical management. Will give another round of medical management if elevated again. Plan:  Trend potassium on daily BMP.

## 2023-11-07 NOTE — ASSESSMENT & PLAN NOTE
Large B-cell lymphoma, stage II. First chemo cycle 9/22 4 days of R-EPOCH. 9/27 Rituxan. 9/28 Filgrastim. Received nivestym 300 mc 7 days dcd on 10/26      Prophylaxis:  Bactrim prophylaxis Monday Wednesday Friday,  Acyclovir 400 mg twice daily  Fluconazole 200 mg daily  Levofloxacin 250 mg daily  Lovenox 40 mg daily    9/22  4 days of R-EPOCH.  9/27 Rituxan  10/13 for [facility] cycle 2, which was done over 4 days and cytoxan on 10/19.  11/6 completed R-EPOCH cycle 3. Plan:  She will receive Cytoxan on 11/7. Oncology noted that she will require a 2 L fluid bolus before cytoxan. Ok to give bolus. Oncology is following. Management as per onc team.  Transfuse blood products as needed.   Keep Hgb>7 and Plt>20 for chemo   See acute respiratory failure above

## 2023-11-07 NOTE — ASSESSMENT & PLAN NOTE
Lab Results   Component Value Date    EGFR 51 11/07/2023    EGFR 61 11/06/2023    EGFR 49 11/05/2023    CREATININE 1.09 11/07/2023    CREATININE 0.94 11/06/2023    CREATININE 1.12 11/05/2023     Baseline Cr between 0.75-1.1  Initial HERIBERTO felt 2/2 prerenal azotemia 2/2 diuresis, reduced PO intake +/- ATN. Patient received 2 doses of Bumex IV 2 g as she had not been responding appropriately to IV 40 Lasix daily. Creatinine went up by 0.5 meeting criteria for an HERIBERTO. This may be prerenal intrinsic or postrenal.  Potential prerenal causes include reduced kidney perfusion due to lisinopril. Intrinsic can also be lisinopril due to ATN, AIN from chemo medications, TLS, crystaline nephropathy from acyclovir, rituxan nephrotoxicity, filgrastim glomerulonephritis. Post renal obstructive could be from urine retention from vincristine or cyclophosphamide bladder fibrosis. Suspect most likely due to medications as a combination of prerenal and intrinsic. FENa was 0.2%, UA shows no casts or signs of infection, urine eosinophils 0%. Patient likely has prerenal HERIBERTO from volume depletion. Received 2 L NS and 1 pRBC and cr went up by 0.07 still and Na and Cl went down, suspect that volume prevented further cr rise and free water excretion impaired by kidney function, allowing hyponatremia to increase. Creatinine increase is likely plateaued and went down the following day. Suspect that now that nephrotoxic medications have been stopped she responded well to Lasix and creatinine downtrended. HERIBERTO now resolved. Will be cautious about nephrotoxins and monitor as restarting EPOCH and ppx. Patient gets volume depleted and overloaded very easily. Especially from chemo. 10/19 Heriberto as cr went up by 0.3 in 48 hours from 0.82 on 10/17 to 1.22 on 10/19. Suspect this is prerenal hypovolemic, will see if patient improves with fluids. She gets overloaded easily so if no improvement suspect CRS again. 10/29 HERIBERTO cr went up by 0.3 again.  She has 922 ml UOP in last 24 hours. PO likely from lasix 40mg IV given yesterday. She is not hypovolemic. Plan:   Continue to monitor UO/renal function. Avoid nephrotoxic agents  Continue to monitor a.m. BMP.

## 2023-11-07 NOTE — CONSULTS
Full consult completed 9/18/2023. Palliative was asked to re engage with patient today. See progress note for details.

## 2023-11-07 NOTE — PROGRESS NOTES
[facility]  Progress Note  Name: Ami Garcia  MRN: [id number]  Unit/Bed#: ICU 03 I Date of Admission: 9/13/2023   Date of Service: 11/7/2023 I Hospital Day: 54    Assessment/Plan   * Acute respiratory failure with hypoxia secondary to oropharyngeal mass  Assessment & Plan  Cuffless trach placed 9/17, transitioned to cuffed on 10/3. Oxygen requirements not improving. For mental health patient is on buspirone, quetiapine, and sertraline. For pain, gabapentin, oxycodone scheduled and prn, prn oxycodone mainly utilized for increased pain during chemo and after a bronch. On Guaifenesin, Atrovent and Xopenex for airway maintenance. No improvement in bilateral consolidation or atelectasis and groundglass opacities on repeat CT and chest x-rays. Bronchial cx with 3 colonies CoNS. PCP PCR invalid, repeat negative. CTCAP 10/12:  indicates additional groundglass opacity with paraseptal emphysema, which may be contributing to inability to remain off vent. Patient was transferred to floors 11/2, however desaturated to <50% spo2, and transferred back to critical care. Bronchoscopy performed and unremarkable. Samples sent to lab for culture. Patient was placed back on vent s/p procedure. Now on high flow. Cuffless trach was placed 9/17. Replaced with a cuffed Shiley XLT #7 10/3    Plan:  Completed 7 day course of cefepime and Vancomycin. ID consult  RP 2 negative. CMV PCR not detected  Pending Fungitell, urine histoplasma antigen, Aspergillus galactomannan antigen and antibody. Considering Chemo induced pneumonitis. Will discuss with heme onc. Placed her on dysphagia 2 diet. Continue trach collar during the day and at night.   May need pressure support at night  Wean down HFNC to a goal FiO2 of 50% to be discharged      Hyperkalemia  Assessment & Plan  Recent Labs     11/05/23  0434 11/06/23  0545 11/07/23  0433   K 5.5* 5.1 4.9   MG 1.9 1.8*  --        Workup:  Etiology: Patient takes bactrim. Tumor lysis would be less likely since calcium is elevated rather than decreased. Uric acid is normal.   K+ elevated but returned to normal after medical management. Will give another round of medical management if elevated again. Plan:  Trend potassium on daily BMP. Large cell lymphoma ([address])  Assessment & Plan  Large B-cell lymphoma, stage II. First chemo cycle 9/22 4 days of R-EPOCH. 9/27 Rituxan. 9/28 Filgrastim. Received nivestym 300 mc 7 days dcd on 10/26      Prophylaxis:  Bactrim prophylaxis Monday Wednesday Friday,  Acyclovir 400 mg twice daily  Fluconazole 200 mg daily  Lovenox 40 mg daily    9/22  4 days of R-EPOCH.  9/27 Rituxan  10/13 for [facility] cycle 2, which was done over 4 days and cytoxan on 10/19.  11/6 completed R-EPOCH cycle 3. Plan:  She will receive Cytoxan on 11/7. Oncology noted that she will require a 2 L fluid bolus before cytoxan. Ok to give bolus. Oncology is following. Management as per onc team.  Transfuse blood products as needed. Keep Hgb>7 and Plt>20 for chemo   See acute respiratory failure above    Oropharyngeal mass  Assessment & Plan  9/14 Neck/ soft tissue CT: Large enhancing soft tissue mass identified within the left neck involving multiple spaces. Centered within the left oropharynx with extensions as described above into multiple spaces. This results in significant airway compromise. 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 Small level 3/level 4 nodes are seen within the neck. Tracheostomy by ENT. Replaced on 9/26. H/o tobacco abuse, daily smoker. On nicotine while inpatient, confirmed with patient she needs nicotine patch. CT soft tissue neck shows reduced mass effect from 9/14 to 10/13. Can consider reducing trach size if continues to improve    Plan:  ENT and heme onc following  As per speech therapist diet  Dysphagia 2. Continue parallel PEG tube feeds for nutrition. See acute respiratory failure and large B cell lymphoma above.     RML pneumonia-resolved as of 9/22/2023  Assessment & Plan  9/13 CT PE study: Patchy consolidation right middle lobe, new from 8/25/2023, compatible with pneumonia. No leukocytosis, no fever/chills. Positive for sore throat, cough. New onset pneumonia after recent hospitalization and antibiotics treatment. 9/14: Bronchoscopy/ ABL. MRSA negative. ABL revealed 2+ Growth of Candida albicans, suspected contaminant. Pancytopenia due to chemotherapy (HCC)-resolved as of 11/3/2023  Assessment & Plan  Patient received 1 PRBC transfusion on 10/5. Hemoglobin 8.2 s/p 1 PRBC transfusion on 10/25. B/L is 12-14. No sites of bleeding, no black stools. Iron panel indicative of inflammatory anemia. Normal Vitamin B12 levels, negative hemolysis smear. Folate is low 5.5. Plan:  Trend hemoglobin and transfuse for <7. As per heme/onc transfuse irradiated and leukoreduced blood products. Trend platelets  Folic acid 1mg daily supplementation  As per my conversation with Heme/oncology attending, patient is expected to have chemotherapy associated pancytopenia. No further anemia w/u required. PO (acute kidney injury) (HCC)-resolved as of 9/21/2023  Assessment & Plan  Lab Results   Component Value Date    CREATININE 1.09 11/07/2023    CREATININE 0.94 11/06/2023    CREATININE 1.12 11/05/2023       Likely prerenal.  Second to poor oral intake versus poor urine output. Baseline creatinine 1 to 1.2. Cr at baseline  Plan:  Urinary retention protocol, Daily weights, I/O  Trend Cr daily    Blister (nonthermal) of left forearm, sequela  Assessment & Plan  Blisters of left upper extremity healing, no signs of infection, continue daily wound care. Generalized abdominal pain  Assessment & Plan  Patient reports abdominal pain which is unchanged since 9/22 no guarding on exam. Takes Famotidine for GERD at home. Alk phos 10/2 145, 10/20 79.  CTCAP reveals complex right upper pole renal lesion requiring possible outpatient MRI    Continue Famotidine  On bowel regimen with Senna and Miralax prn    (HFpEF) heart failure with preserved ejection fraction [facility])  Assessment & Plan  Wt Readings from Last 3 Encounters:   11/07/23 78.9 kg (173 lb 15.1 oz)   09/07/23 74.6 kg (164 lb 6.4 oz)   08/30/23 73.3 kg (161 lb 9.6 oz)     Lab Results   Component Value Date    LVEF 60 08/28/2023     (H) 10/28/2023     (H) 09/29/2023     Echo 8/28/23: LVEF 60%. Plan:  K > 4   Measure I/O      Ambulatory dysfunction  Assessment & Plan  2/2 Impacted by chronic pain syndrome + prolonged hospital stay. Continue OOB with PT. PT rec post acute rehab however reportedly Cannot get LTACH placement while getting chemo per CM  She is aware STR SNFs continue to follow and treatment can be done from a SNF level of care. PT would need to be on trach collar for at least 72 hours with no need for the vent. Aware that pt would need to be around 28% FiO2     Depression  Assessment & Plan  Patient on Zoloft 75 daily and Seroquel hs  Patient is depressed, multiple family/palliative discussions. patient is firm that she wants to live and to fight, she is just tired. Currently goal is disease treatment oriented. Full code. No SI/HI ideations. Palliative signed off, will reconsult if patient changes her mind regarding wishes. Chronic Superficial thrombophlebitis  Assessment & Plan  Right forearm soreness. RUE US: No acute or chronic deep vein thrombosis. 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 PO not severe enough to require renal dosing at this time, will continue to monitor and adjust dosing as needed.       Continue DVT ppx with Lovenox 40    CKD (chronic kidney disease) stage 3, GFR 30-59 ml/min [facility])  Assessment & Plan  Lab Results   Component Value Date    EGFR 51 11/07/2023    EGFR 61 11/06/2023    EGFR 49 11/05/2023    CREATININE 1.09 11/07/2023    CREATININE 0.94 11/06/2023    CREATININE 1.12 11/05/2023 Baseline Cr between 0.75-1.1  Initial HERIBERTO felt 2/2 prerenal azotemia 2/2 diuresis, reduced PO intake +/- ATN. Patient received 2 doses of Bumex IV 2 g as she had not been responding appropriately to IV 40 Lasix daily. Creatinine went up by 0.5 meeting criteria for an HERIBERTO. This may be prerenal intrinsic or postrenal.  Potential prerenal causes include reduced kidney perfusion due to lisinopril. Intrinsic can also be lisinopril due to ATN, AIN from chemo medications, TLS, crystaline nephropathy from acyclovir, rituxan nephrotoxicity, filgrastim glomerulonephritis. Post renal obstructive could be from urine retention from vincristine or cyclophosphamide bladder fibrosis. Suspect most likely due to medications as a combination of prerenal and intrinsic. FENa was 0.2%, UA shows no casts or signs of infection, urine eosinophils 0%. Patient likely has prerenal HERIBERTO from volume depletion. Received 2 L NS and 1 pRBC and cr went up by 0.07 still and Na and Cl went down, suspect that volume prevented further cr rise and free water excretion impaired by kidney function, allowing hyponatremia to increase. Creatinine increase is likely plateaued and went down the following day. Suspect that now that nephrotoxic medications have been stopped she responded well to Lasix and creatinine downtrended. HERIBERTO now resolved. Will be cautious about nephrotoxins and monitor as restarting EPOCH and ppx. Patient gets volume depleted and overloaded very easily. Especially from chemo. 10/19 Heriberto as cr went up by 0.3 in 48 hours from 0.82 on 10/17 to 1.22 on 10/19. Suspect this is prerenal hypovolemic, will see if patient improves with fluids. She gets overloaded easily so if no improvement suspect CRS again. 10/29 HERIBERTO cr went up by 0.3 again. She has 922 ml UOP in last 24 hours. HERIBERTO likely from lasix 40mg IV given yesterday. She is not hypovolemic. Plan:   Continue to monitor UO/renal function.  Avoid nephrotoxic agents  Continue to monitor a.m. BMP. Pain syndrome, chronic  Assessment & Plan  Home regimen: Oxycodone 5 mg BID, Tylenol 650 mg q8 prn. Gabapentin 600 mg TID. Medical marijuana. Lumbar radiculopathy and impaired ambulatory dysfunction secondary to pain. Has bowel regimen and is having bowel movements daily. Patient required additional pain management during previous cycle and has some additional pain from tunneled line placement    Plan:  Continue gabapentin 300 mg TID, oxycodone 5 mg TID, prn Tylenol 650 mg q6. Oxycodone 5mg every 8 hours  Oxycodone 2.5 mg q6 PRN    Benign essential hypertension  Assessment & Plan  Home regimen Coreg 12.5 mg BID, Amlodipine 10 mg daily, and lisinopril 40 mg. All discontinued at this time secondary to hypotension and PO since cycle 1. but stable currently without any antihypertensives. Systolic persistently elevated and tachycardic, potentially secondary to pain. Patient was more hypotensive during last chemo. Coreg contraindicated during chemo, since cycles are every other week, would be better to stick with lopressor during duration of therapy as opposed to switching constantly. Plan:  Monitor vitals. Continue Norvasc 10 and lopressor 25 bid  Prn hydralazine for SBP > 160    Angioedema-resolved as of 11/4/2023  Assessment & Plan  POA in [place] ([place] ED: Swollen tongue, soft and hard palate with severe angioedema. Decadron 10 mg, Benadryl 25 mg given. 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 Plan:  Cuffless trach 9/17, cuffed Shiley XLT #7 10/3  See acute respiratory failure and oropharyngeal mass above  Continue to wean vent. Hyperlipidemia-resolved as of 10/26/2023  Assessment & Plan  Lab Results   Component Value Date    CHOLESTEROL 118 10/09/2023    HDL 14 (L) 10/09/2023    TRIG 144 10/09/2023    [address] 104 10/09/2023     Continue outpatient diet and lifestyle modification.     COPD without exacerbation (HCC)-resolved as of 9/26/2023  Assessment & Plan  Continue vent with nebs. Wean off vent. Encephalopathy-resolved as of 9/21/2023  Assessment & Plan  9/19- Pt appeared to be AAO x3. Improving. ICU Core Measures     Vented Patient  VAP Bundle  VAP bundle ordered     A: Assess, Prevent, and Manage Pain Has pain been assessed? Yes  Need for changes to pain regimen? No   B: Both Spontaneous Awakening Trials (SATs) and Spontaneous Breathing Trials (SBTs) Plan to perform spontaneous awakening trial today? N/A      C: Choice of Sedation RASS Goal: 0 Alert and Calm  Need for changes to sedation or analgesia regimen? NA   D: Delirium CAM-ICU: Negative   E: Early Mobility  Plan for early mobility? Yes   F: Family Engagement Plan for family engagement today? Yes       Antibiotic Review: Patient on appropriate coverage based on culture data. Review of Invasive Devices:      Central access plan: Medications requiring central line      Prophylaxis:  VTE VTE covered by:  enoxaparin, Subcutaneous, 40 mg at 11/07/23 0830       Stress Ulcer  covered byfamotidine (PEPCID) tablet 20 mg [[id number]]       Subjective   HPI/24hr events: Patient underwent bronchoscopy yesterday, samples sent to lab. She was placed on vent after that. She continues to be on the vent. 1 episode of desaturation but no changes to her vent settings were made considering it might be an error. Patient comfortable on vent. Review of systems deferred at this time as patient was on ventilator, unable to verbalize and asked me to leave the room.        Objective                            Vitals I/O      Most Recent Min/Max in 24hrs   Temp 98 °F (36.7 °C) Temp  Min: 97.8 °F (36.6 °C)  Max: 98.6 °F (37 °C)   Pulse 87 Pulse  Min: 77  Max: 102   Resp (!) 37 Resp  Min: 18  Max: 37   /81 BP  Min: 127/73  Max: 177/88   O2 Sat 100 % SpO2  Min: 94 %  Max: 100 %      Intake/Output Summary (Last 24 hours) at 11/7/2023 0858  Last data filed at 11/7/2023 0700  Gross per 24 hour   Intake 150 ml   Output 1500 ml   Net -1350 ml         Diet Enteral/Parenteral; Tube Feeding No Oral Diet; Two Telly HN; Bolus; 228; (4x/day) - 8:00AM,12:00PM,4:00PM,8:00PM; Banatrol Plus Banana Flakes - Two Packets Daily; 95; Water; Before and After each Bolus     Invasive Monitoring Physical exam    Physical Exam  Eyes:      Pupils: Pupils are equal, round, and reactive to light. HENT:      Head: Normocephalic and atraumatic. Neck:      Trachea: Tracheostomy present. Comments: On VC+ vent  Constitutional:       Appearance: She is ill-appearing. Neurological:      Mental Status: She is alert. She is agitated. Further exam was unable to be performed as patient did not want to be examined. Diagnostic Studies      EKG: NSR  Imaging: No new serial imaging I have personally reviewed pertinent reports.        Medications:  Scheduled PRN   acyclovir, 400 mg, BID  amLODIPine, 10 mg, Daily  budesonide, 0.5 mg, Q12H  busPIRone, 30 mg, TID  chlorhexidine, 15 mL, Q12H ASHLEY  cyclophosphamide (CYTOXAN) 1,350 mg in sodium chloride 0.9 % 250 mL IVPB, 750 mg/m2 (Treatment Plan Recorded), Once  DOXOrubicin (ADRIAMYCIN) 18 mg, etoposide (TOPOSAR) 67.6 mg, vinCRIStine (ONCOVIN) 0.7 mg in sodium chloride 0.9 % 500 mL infusion, , Q24H  enoxaparin, 40 mg, Q24H ASHLEY  famotidine, 20 mg, BID  fentanyl citrate (PF), 100 mcg, Once  fluconazole, 641 mg, Daily  folic acid, 1 mg, Daily  formoterol, 20 mcg, Q12H  furosemide, 40 mg, BID (diuretic)  gabapentin, 300 mg, TID  levalbuterol, 1.25 mg, Q6H   And  ipratropium, 0.5 mg, Q6H  melatonin, 3 mg, HS  metoprolol tartrate, 25 mg, Q12H ASHLEY  midazolam, 2 mg, Once  nicotine, 7 mg, Daily  ondansetron (ZOFRAN) 16 mg, dexamethasone (DECADRON) 10 mg in sodium chloride 0.9 % 50 mL IVPB, , Q24H  oxyCODONE, 5 mg, Q8H  predniSONE, 60 mg/m2 (Treatment Plan Recorded), BID With Meals  QUEtiapine, 50 mg, HS  senna, 8.8 mg, HS  sertraline, 75 mg, Daily  sodium chloride, 2,000 mL, Once  sulfamethoxazole-trimethoprim, 1 tablet, Once per day on Mon Wed Fri      alteplase, 2 mg, Q1MIN PRN  alteplase, 2 mg, Q1MIN PRN  ondansetron, 4 mg, Q8H PRN  oxyCODONE, 2.5 mg, Q6H PRN       Continuous          Labs:    CBC    Recent Labs     11/06/23 0545 11/06/23  1322 11/07/23 0433   WBC 12.27*  --  7.14   HGB 7.1* 7.5* 7.8*   HCT 22.8* 22* 25.5*   *  --  441*   BANDSPCT 5  --   --      BMP    Recent Labs     11/06/23 0545 11/06/23 1322 11/07/23 0433   SODIUM 143  --  146   K 5.1  --  4.9     --  106   CO2 29 31 29   AGAP 7  --  11   BUN 60*  --  62*   CREATININE 0.94  --  1.09   CALCIUM 9.1  --  10.1       Coags    No recent results     Additional Electrolytes  Recent Labs     11/06/23 0545 11/06/23 1322 11/07/23  0433   MG 1.8*  --   --    PHOS 3.2  --  5.2*   CAIONIZED  --  1.33*  --           Blood Gas    No recent results  No recent results LFTs  Recent Labs     11/06/23 0545 11/07/23 0433   ALT 34 34   AST 25 19   ALKPHOS 74 72   ALB 3.2* 3.6   TBILI 0.19* 0.37       Infectious  No recent results  Glucose  Recent Labs     11/06/23 0545 11/07/23  0433   GLUC 173* 136                 Jose Alfredo Sosa MD

## 2023-11-08 LAB
A FLAVUS AB SER QL ID: NEGATIVE
A FUMIGATUS AB SER QL ID: NEGATIVE
A NIGER AB SER QL ID: NEGATIVE
ALBUMIN SERPL BCP-MCNC: 3.5 G/DL (ref 3.5–5)
ALP SERPL-CCNC: 68 U/L (ref 34–104)
ALT SERPL W P-5'-P-CCNC: 59 U/L (ref 7–52)
ANION GAP SERPL CALCULATED.3IONS-SCNC: 7 MMOL/L
AST SERPL W P-5'-P-CCNC: 36 U/L (ref 13–39)
BACTERIA BRONCH AEROBE CULT: ABNORMAL
BACTERIA BRONCH AEROBE CULT: ABNORMAL
BACTERIA BRONCH AEROBE CULT: NO GROWTH
BASOPHILS # BLD AUTO: 0 THOUSANDS/ÂΜL (ref 0–0.1)
BASOPHILS NFR BLD AUTO: 0 % (ref 0–1)
BILIRUB SERPL-MCNC: 0.47 MG/DL (ref 0.2–1)
BUN SERPL-MCNC: 41 MG/DL (ref 5–25)
CALCIUM SERPL-MCNC: 10.3 MG/DL (ref 8.4–10.2)
CHLORIDE SERPL-SCNC: 107 MMOL/L (ref 96–108)
CMV DNA SERPL NAA+PROBE-ACNC: NOT DETECTED [IU]/ML
CO2 SERPL-SCNC: 30 MMOL/L (ref 21–32)
CREAT SERPL-MCNC: 0.79 MG/DL (ref 0.6–1.3)
EOSINOPHIL # BLD AUTO: 0 THOUSAND/ÂΜL (ref 0–0.61)
EOSINOPHIL NFR BLD AUTO: 0 % (ref 0–6)
ERYTHROCYTE [DISTWIDTH] IN BLOOD BY AUTOMATED COUNT: 15.9 % (ref 11.6–15.1)
GFR SERPL CREATININE-BSD FRML MDRD: 76 ML/MIN/1.73SQ M
GLUCOSE SERPL-MCNC: 108 MG/DL (ref 65–140)
GRAM STN SPEC: ABNORMAL
GRAM STN SPEC: ABNORMAL
GRAM STN SPEC: NORMAL
H CAPSUL AG UR IA-ACNC: <0.5
HCT VFR BLD AUTO: 24.4 % (ref 34.8–46.1)
HGB BLD-MCNC: 7.7 G/DL (ref 11.5–15.4)
IMM GRANULOCYTES # BLD AUTO: 0.03 THOUSAND/UL (ref 0–0.2)
IMM GRANULOCYTES NFR BLD AUTO: 1 % (ref 0–2)
LDH SERPL-CCNC: 209 U/L (ref 140–271)
LYMPHOCYTES # BLD AUTO: 0.19 THOUSANDS/ÂΜL (ref 0.6–4.47)
LYMPHOCYTES NFR BLD AUTO: 4 % (ref 14–44)
MAGNESIUM SERPL-MCNC: 1.7 MG/DL (ref 1.9–2.7)
MCH RBC QN AUTO: 30.7 PG (ref 26.8–34.3)
MCHC RBC AUTO-ENTMCNC: 31.6 G/DL (ref 31.4–37.4)
MCV RBC AUTO: 97 FL (ref 82–98)
MISCELLANEOUS LAB TEST RESULT: NORMAL
MONOCYTES # BLD AUTO: 0.14 THOUSAND/ÂΜL (ref 0.17–1.22)
MONOCYTES NFR BLD AUTO: 3 % (ref 4–12)
NEUTROPHILS # BLD AUTO: 4.72 THOUSANDS/ÂΜL (ref 1.85–7.62)
NEUTS SEG NFR BLD AUTO: 92 % (ref 43–75)
NRBC BLD AUTO-RTO: 0 /100 WBCS
P JIROVECII AG SPEC QL IF: NORMAL
PHOSPHATE SERPL-MCNC: 3.6 MG/DL (ref 2.3–4.1)
PLATELET # BLD AUTO: 419 THOUSANDS/UL (ref 149–390)
PMV BLD AUTO: 9.1 FL (ref 8.9–12.7)
POTASSIUM SERPL-SCNC: 4.2 MMOL/L (ref 3.5–5.3)
PROT SERPL-MCNC: 6.2 G/DL (ref 6.4–8.4)
RBC # BLD AUTO: 2.51 MILLION/UL (ref 3.81–5.12)
SODIUM SERPL-SCNC: 144 MMOL/L (ref 135–147)
URATE SERPL-MCNC: 5.1 MG/DL (ref 2–7.5)
WBC # BLD AUTO: 5.08 THOUSAND/UL (ref 4.31–10.16)

## 2023-11-08 PROCEDURE — 94640 AIRWAY INHALATION TREATMENT: CPT

## 2023-11-08 PROCEDURE — 99232 SBSQ HOSP IP/OBS MODERATE 35: CPT | Performed by: NURSE PRACTITIONER

## 2023-11-08 PROCEDURE — 84550 ASSAY OF BLOOD/URIC ACID: CPT | Performed by: INTERNAL MEDICINE

## 2023-11-08 PROCEDURE — 83615 LACTATE (LD) (LDH) ENZYME: CPT | Performed by: INTERNAL MEDICINE

## 2023-11-08 PROCEDURE — 84100 ASSAY OF PHOSPHORUS: CPT | Performed by: INTERNAL MEDICINE

## 2023-11-08 PROCEDURE — 99291 CRITICAL CARE FIRST HOUR: CPT | Performed by: INTERNAL MEDICINE

## 2023-11-08 PROCEDURE — 85027 COMPLETE CBC AUTOMATED: CPT | Performed by: INTERNAL MEDICINE

## 2023-11-08 PROCEDURE — 99233 SBSQ HOSP IP/OBS HIGH 50: CPT | Performed by: STUDENT IN AN ORGANIZED HEALTH CARE EDUCATION/TRAINING PROGRAM

## 2023-11-08 PROCEDURE — 88112 CYTOPATH CELL ENHANCE TECH: CPT | Performed by: SPECIALIST

## 2023-11-08 PROCEDURE — 80053 COMPREHEN METABOLIC PANEL: CPT | Performed by: INTERNAL MEDICINE

## 2023-11-08 PROCEDURE — 94760 N-INVAS EAR/PLS OXIMETRY 1: CPT

## 2023-11-08 PROCEDURE — 83735 ASSAY OF MAGNESIUM: CPT | Performed by: INTERNAL MEDICINE

## 2023-11-08 RX ORDER — MAGNESIUM SULFATE HEPTAHYDRATE 40 MG/ML
2 INJECTION, SOLUTION INTRAVENOUS ONCE
Status: COMPLETED | OUTPATIENT
Start: 2023-11-08 | End: 2023-11-08

## 2023-11-08 RX ORDER — SODIUM CHLORIDE FOR INHALATION 3 %
4 VIAL, NEBULIZER (ML) INHALATION 3 TIMES DAILY
Status: DISCONTINUED | OUTPATIENT
Start: 2023-11-08 | End: 2023-11-21

## 2023-11-08 RX ADMIN — FORMOTEROL FUMARATE DIHYDRATE 20 MCG: 20 SOLUTION RESPIRATORY (INHALATION) at 19:48

## 2023-11-08 RX ADMIN — CHLORHEXIDINE GLUCONATE 15 ML: 1.2 SOLUTION ORAL at 09:33

## 2023-11-08 RX ADMIN — METOPROLOL TARTRATE 25 MG: 25 TABLET, FILM COATED ORAL at 09:33

## 2023-11-08 RX ADMIN — BUDESONIDE 0.5 MG: 0.5 INHALANT ORAL at 19:48

## 2023-11-08 RX ADMIN — FAMOTIDINE 20 MG: 20 TABLET, FILM COATED ORAL at 09:32

## 2023-11-08 RX ADMIN — AMLODIPINE BESYLATE 10 MG: 5 TABLET ORAL at 09:32

## 2023-11-08 RX ADMIN — SODIUM CHLORIDE SOLN NEBU 3% 4 ML: 3 NEBU SOLN at 13:54

## 2023-11-08 RX ADMIN — SULFAMETHOXAZOLE AND TRIMETHOPRIM 1 TABLET: 800; 160 TABLET ORAL at 09:33

## 2023-11-08 RX ADMIN — MAGNESIUM SULFATE HEPTAHYDRATE 2 G: 40 INJECTION, SOLUTION INTRAVENOUS at 09:34

## 2023-11-08 RX ADMIN — DIPHENHYDRAMINE HYDROCHLORIDE 25 MG: 50 INJECTION, SOLUTION INTRAMUSCULAR; INTRAVENOUS at 09:19

## 2023-11-08 RX ADMIN — GABAPENTIN 300 MG: 300 CAPSULE ORAL at 09:33

## 2023-11-08 RX ADMIN — FOLIC ACID 1 MG: 1 TABLET ORAL at 09:33

## 2023-11-08 RX ADMIN — GABAPENTIN 300 MG: 300 CAPSULE ORAL at 15:00

## 2023-11-08 RX ADMIN — BUDESONIDE 0.5 MG: 0.5 INHALANT ORAL at 07:12

## 2023-11-08 RX ADMIN — NICOTINE 7 MG: 7 PATCH, EXTENDED RELEASE TRANSDERMAL at 09:34

## 2023-11-08 RX ADMIN — IPRATROPIUM BROMIDE 0.5 MG: 0.5 SOLUTION RESPIRATORY (INHALATION) at 01:42

## 2023-11-08 RX ADMIN — LEVALBUTEROL HYDROCHLORIDE 1.25 MG: 1.25 SOLUTION RESPIRATORY (INHALATION) at 19:48

## 2023-11-08 RX ADMIN — ACYCLOVIR 400 MG: 200 CAPSULE ORAL at 18:30

## 2023-11-08 RX ADMIN — MELATONIN 3 MG: at 21:43

## 2023-11-08 RX ADMIN — BUSPIRONE HYDROCHLORIDE 30 MG: 10 TABLET ORAL at 15:00

## 2023-11-08 RX ADMIN — OXYCODONE HYDROCHLORIDE 5 MG: 5 SOLUTION ORAL at 21:37

## 2023-11-08 RX ADMIN — FAMOTIDINE 20 MG: 20 TABLET, FILM COATED ORAL at 18:30

## 2023-11-08 RX ADMIN — OXYCODONE HYDROCHLORIDE 5 MG: 5 SOLUTION ORAL at 14:51

## 2023-11-08 RX ADMIN — ACYCLOVIR 400 MG: 200 CAPSULE ORAL at 09:33

## 2023-11-08 RX ADMIN — FLUCONAZOLE 400 MG: 100 TABLET ORAL at 09:33

## 2023-11-08 RX ADMIN — FORMOTEROL FUMARATE DIHYDRATE 20 MCG: 20 SOLUTION RESPIRATORY (INHALATION) at 09:06

## 2023-11-08 RX ADMIN — SODIUM CHLORIDE SOLN NEBU 3% 4 ML: 3 NEBU SOLN at 19:48

## 2023-11-08 RX ADMIN — QUETIAPINE FUMARATE 50 MG: 25 TABLET ORAL at 21:43

## 2023-11-08 RX ADMIN — LEVALBUTEROL HYDROCHLORIDE 1.25 MG: 1.25 SOLUTION RESPIRATORY (INHALATION) at 13:54

## 2023-11-08 RX ADMIN — GABAPENTIN 300 MG: 300 CAPSULE ORAL at 21:43

## 2023-11-08 RX ADMIN — IPRATROPIUM BROMIDE 0.5 MG: 0.5 SOLUTION RESPIRATORY (INHALATION) at 13:54

## 2023-11-08 RX ADMIN — OXYCODONE HYDROCHLORIDE 5 MG: 5 SOLUTION ORAL at 05:19

## 2023-11-08 RX ADMIN — BUSPIRONE HYDROCHLORIDE 30 MG: 10 TABLET ORAL at 21:49

## 2023-11-08 RX ADMIN — IPRATROPIUM BROMIDE 0.5 MG: 0.5 SOLUTION RESPIRATORY (INHALATION) at 07:12

## 2023-11-08 RX ADMIN — BUSPIRONE HYDROCHLORIDE 30 MG: 10 TABLET ORAL at 09:32

## 2023-11-08 RX ADMIN — SODIUM CHLORIDE 20 ML/HR: 0.9 INJECTION, SOLUTION INTRAVENOUS at 10:02

## 2023-11-08 RX ADMIN — SERTRALINE 75 MG: 25 TABLET, FILM COATED ORAL at 09:33

## 2023-11-08 RX ADMIN — ENOXAPARIN SODIUM 40 MG: 40 INJECTION SUBCUTANEOUS at 09:33

## 2023-11-08 RX ADMIN — ACETAMINOPHEN 650 MG: 325 TABLET, FILM COATED ORAL at 09:32

## 2023-11-08 RX ADMIN — METOPROLOL TARTRATE 25 MG: 25 TABLET, FILM COATED ORAL at 21:43

## 2023-11-08 RX ADMIN — RITUXIMAB 700 MG: 10 INJECTION, SOLUTION INTRAVENOUS at 10:01

## 2023-11-08 RX ADMIN — LEVALBUTEROL HYDROCHLORIDE 1.25 MG: 1.25 SOLUTION RESPIRATORY (INHALATION) at 07:11

## 2023-11-08 RX ADMIN — LEVALBUTEROL HYDROCHLORIDE 1.25 MG: 1.25 SOLUTION RESPIRATORY (INHALATION) at 01:42

## 2023-11-08 RX ADMIN — IPRATROPIUM BROMIDE 0.5 MG: 0.5 SOLUTION RESPIRATORY (INHALATION) at 19:49

## 2023-11-08 NOTE — PROGRESS NOTES
Progress Note - Infectious Disease   Leeanna Powell 77 y.o. female MRN: [id number]  Unit/Bed#: ICU 03 Encounter: [id number]      Impression/Plan:    1. Acute hypoxic respiratory failure. Present on admission 9/13/2023. Patient found to have an obstructing soft tissue mass in the neck. Status post trach and biopsy 9/17/2023 and pathology was consistent with diffuse large B-cell lymphoma. The patient has undergone 2 cycles of chemotherapy with R-EPOCH. Worsening hypoxia the week of 10/30. CT chest with persistent severe bilateral alveolar interstitial opacities. Sputum culture growing mixed respiratory stacy. The patient is afebrile and despite hypoxia appears relatively well. Low concern for typical bacterial infection given imaging findings and lack of response to antibiotics. Could consider an atypical viral or fungal infection however the patient has been in the hospital for >50 days so has not had very recent environmental exposure and she has been on antimicrobial prophylaxis with acyclovir, fluconazole, Bactrim since starting on chemotherapy. Low concern for PJP given continuous Bactrim prophylaxis, appearance of chest imaging, normal LDH. CMV PCR, RP2 negative, fungitell. Consideration for a drug-induced pneumonitis related to chemotherapy, ARDS. Status post bronchoscopy 11/6 with thin secretions noted; PJP DFA negative and cytology mixed inflammatory cells. Culture with one colony staph epi and mixed respiratory stacy, not clinically significant. Now weaned to trach collar              -Continue antimicrobial prophylaxis as below, monitor off other antimicrobials              -High-dose steroids per ICU              -Follow up urine histoplasma antigen, Aspergillus galactomannan antigen and antibody              -follow up bronchoscopy studies               -Additional supportive care per critical care team     2. Diffuse large B-cell lymphoma.   Diagnosed this admission after the patient presented with an obstructing neck mass. Confirmed on biopsy  and the patient underwent trach placement. Status post 2 cycles of R-EPOCH, last on 10/20/2023 and started cycle 3 11/3                        -Oncology follow-up              -Continue antimicrobial prophylaxis per oncology with Bactrim, fluconazole, acyclovir              -Monitor for TLS     3. Oropharyngeal mass. Present on admission. Status post trach placement and biopsy . Pathology consistent with diffuse large B-cell lymphoma.              -Trach management per ICU     4. PO on CKD. Creatinine fluctuating this admission, may be ATN related to chemo medications and volume shifts. -Monitor creatinine              -Nephrotoxic agents              -Dose adjust antimicrobials for creatinine clearance     I have discussed the above management plan in detail with the patient at bedside, critical care team. ID will see again as needed, please call with questions. Antibiotics:  Ppx Acyclovir, fluconazole, TMP-SMX    Subjective: The patient is feeling okay. Currently on trach collar. Denies fever, chills, shortness of breath. Still has a productive cough. Tolerating cycle 3 chemotherapy. Objective:  Vitals:  Temp:  [98.1 °F (36.7 °C)-99.6 °F (37.6 °C)] 98.7 °F (37.1 °C)  HR:  [] 85  Resp:  [20] 20  BP: (127-165)/(65-81) 142/74  SpO2:  [86 %-99 %] 96 %  Temp (24hrs), Av.9 °F (37.2 °C), Min:98.1 °F (36.7 °C), Max:99.6 °F (37.6 °C)  Current: Temperature: 98.7 °F (37.1 °C)    Physical Exam:   General Appearance:  Alert, interactive, nontoxic, no acute distress. Throat: Oropharynx moist without lesions. Trach in place   Lungs:   Decreased breath sounds bilaterally, scattered crackles   Heart:  RRR; no murmur, rub or gallop   Abdomen:   Soft, non-tender, non-distended, positive bowel sounds. +PEG     Extremities: No clubbing, cyanosis or edema   Skin: No new rashes or lesions. No draining wounds noted. Labs:    All pertinent labs and imaging studies were personally reviewed  Results from last 7 days   Lab Units 11/08/23 0634 11/07/23 0433 11/06/23 1322 11/06/23  0545   WBC Thousand/uL 5.08 7.14  --  12.27*   HEMOGLOBIN g/dL 7.7* 7.8*  --  7.1*   I STAT HEMOGLOBIN g/dl  --   --  7.5*  --    PLATELETS Thousands/uL 419* 441*  --  405*       Results from last 7 days   Lab Units 11/08/23 0634 11/07/23 0433 11/06/23 1322 11/06/23  0545   SODIUM mmol/L 144 146  --  143   POTASSIUM mmol/L 4.2 4.9  --  5.1   CHLORIDE mmol/L 107 106  --  107   CO2 mmol/L 30 29  --  29   CO2, I-STAT mmol/L  --   --  31  --    BUN mg/dL 41* 62*  --  60*   CREATININE mg/dL 0.79 1.09  --  0.94   EGFR ml/min/1.73sq m 76 51  --  61   GLUCOSE, ISTAT mg/dl  --   --  139  --    CALCIUM mg/dL 10.3* 10.1  --  9.1   AST U/L 36 19  --  25   ALT U/L 59* 34  --  34   ALK PHOS U/L 68 72  --  74           Imaging:  CT chest-persistent severe bilateral alveolar interstitial opacities

## 2023-11-08 NOTE — ASSESSMENT & PLAN NOTE
Wt Readings from Last 3 Encounters:   11/08/23 79.5 kg (175 lb 4.3 oz)   09/07/23 74.6 kg (164 lb 6.4 oz)   08/30/23 73.3 kg (161 lb 9.6 oz)     Lab Results   Component Value Date    LVEF 60 08/28/2023     (H) 10/28/2023     (H) 09/29/2023     Echo 8/28/23: LVEF 60%.        Plan:  K > 4   Measure I/O

## 2023-11-08 NOTE — ASSESSMENT & PLAN NOTE
Lab Results   Component Value Date    CHOLESTEROL 118 10/09/2023    HDL 14 (L) 10/09/2023    TRIG 144 10/09/2023    [address] 104 10/09/2023     Continue outpatient diet and lifestyle modification.

## 2023-11-08 NOTE — PROGRESS NOTES
Progress Note - Palliative & Supportive Care  Benjy Maloney  77 y.o.  female  ICU 03/ICU 03   MRN: [id number]  Encounter: [id number]     Assessment  Acute respiratory failure with hypoxia  HFpEF  Large cell lymphoma  Oropharyngeal mass  CKD  COPD  Chronic pain syndrome  Ambulatory dysfunction  Goals of care counseling  Palliative care encounter     Plan:  Symptom management  Will defer symptom management to the ICU team at this time. 2.   Goals:  Level 1 code status  Disease focused care with no limits placed. Goals are VERY clear and patient does not want to discuss any other course of action. When patient discusses going home she means with full care. She wishes to continue chemo and "get well."  Patient is requesting bullet pointed list with specific requirements to go home. 3.  Social support:  Very frustrated  No longer trusting of providers  "Loves" the nursing team in the ICU  Will live with daughter when discharged  She is  from her spouse but he is still very involved in her life and assists with her care. He lives with her son. Supportive listening provided  Normalized experience of patient  Provided anxiety containment  Provided anticipatory guidance  Discussed spiritual needs     4. Follow up  Palliative Care will continue to follow and goals of care discussions will be ongoing. Please reach out via [employer] if questions or concerns arise. 5. Care Coordination  Reviewed case with TINO Plata    4. Follow up  Palliative Care will continue to follow and goals of care discussions will be ongoing. Please reach out via [employer] if questions or concerns arise. 5. Care Coordination  Reviewed case with RN, [facility]    24 Hour History  Chart reviewed before visit. Patient seen awake and alert in bed watching TV. She is obviously bothered by my presence. Says unless I am there to get her home she does not want to speak with me.   Offered to allow her time to express her frustrations in a safe space, explaining that it is understandable that she is frustrated. She requests my title and asks what she needs to leave. Attempted to discuss her current needs and she asks "what if I disagree with everyone her, can I leave."  Discussed what it means to leave [facility] and reminded her that currently she wouldn't "just be able to leave" because of the O2 support she needs. She requested a bullet point list of what she needs to leave and said otherwise she does not need to speak to me today. Review of Systems   All other systems reviewed and are negative.     Medications    Current Facility-Administered Medications:     acetaminophen (TYLENOL) tablet 650 mg, 650 mg, Oral, Once, Misty Phelps MD    acyclovir (ZOVIRAX) capsule 400 mg, 400 mg, Oral, BID, RANDEE Chavarria, 400 mg at 11/07/23 1744    alteplase (CATHFLO) injection 2 mg, 2 mg, Intracatheter, Q1MIN PRN, RANDEE Garibay    alteplase (CATHFLO) injection 2 mg, 2 mg, Intracatheter, Q1MIN PRN, Pari Nicolas, RANDEE    alteplase (CATHFLO) injection 2 mg, 2 mg, Intracatheter, Q1MIN PRN, Misty Phelps MD    amLODIPine (NORVASC) tablet 10 mg, 10 mg, Oral, Daily, RANDEE Chavarria, 10 mg at 11/07/23 0830    budesonide (PULMICORT) inhalation solution 0.5 mg, 0.5 mg, Nebulization, Q12H, RANDEE Chavarria, 0.5 mg at 11/08/23 9166    busPIRone (BUSPAR) tablet 30 mg, 30 mg, Oral, TID, RANDEE Chavarria, 30 mg at 11/07/23 2254    chlorhexidine (PERIDEX) 0.12 % oral rinse 15 mL, 15 mL, Mouth/Throat, Q12H [facility], RANDEE Chavarria, 15 mL at 11/07/23 2253    diphenhydrAMINE (BENADRYL) 25 mg in sodium chloride 0.9 % 50 mL IVPB, 25 mg, Intravenous, Once, Misty Phelps MD    enoxaparin (LOVENOX) subcutaneous injection 40 mg, 40 mg, Subcutaneous, Q24H [facility], RANDEE Chavarria, 40 mg at 11/07/23 0830    famotidine (PEPCID) tablet 20 mg, 20 mg, Oral, BID, RANDEE Chavarria, 20 mg at 11/07/23 6937    fentanyl citrate (PF) 100 MCG/2ML 100 mcg, 100 mcg, Intravenous, Once, RANDEE Chavarria    Filgrastim-aafi (NIVESTYM) subcutaneous injection 300 mcg, 300 mcg, Subcutaneous, Once, Saul Casey DO    fluconazole (DIFLUCAN) tablet 400 mg, 400 mg, Oral, Daily, RANDEE Chavarria, 400 mg at 91/00/43 7216    folic acid (FOLVITE) tablet 1 mg, 1 mg, Oral, Daily, RANDEE Chavarria, 1 mg at 11/07/23 0831    formoterol (PERFOROMIST) nebulizer solution 20 mcg, 20 mcg, Nebulization, Q12H, RANDEE Chavarria, 20 mcg at 11/08/23 0906    furosemide (LASIX) injection 40 mg, 40 mg, Intravenous, BID (diuretic), Stacy Antonio MD, 40 mg at 11/06/23 1738    gabapentin (NEURONTIN) capsule 300 mg, 300 mg, Oral, TID, RANDEE Chavarria, 300 mg at 11/07/23 2252    levalbuterol (Ketan Graver) inhalation solution 1.25 mg, 1.25 mg, Nebulization, Q6H, 1.25 mg at 11/08/23 0711 **AND** ipratropium (ATROVENT) 0.02 % inhalation solution 0.5 mg, 0.5 mg, Nebulization, Q6H, RANDEE Chavarria, 0.5 mg at 11/08/23 9744    magnesium sulfate 2 g/50 mL IVPB (premix) 2 g, 2 g, Intravenous, Once, Jose Alfredo Sosa MD    melatonin tablet 3 mg, 3 mg, Oral, HS, RANDEE Chavarria, 3 mg at 11/07/23 2252    metoprolol tartrate (LOPRESSOR) tablet 25 mg, 25 mg, Oral, Q12H [address], RANDEE Chavarria, 25 mg at 11/07/23 2252    midazolam (VERSED) injection 2 mg, 2 mg, Intravenous, Once, RANDEE Chavarria    [COMPLETED] nicotine (NICODERM CQ) 14 mg/24hr TD 24 hr patch 14 mg, 14 mg, Transdermal, Daily, 14 mg at 10/15/23 1002 **FOLLOWED BY** nicotine (NICODERM CQ) 7 mg/24hr TD 24 hr patch 7 mg, 7 mg, Transdermal, Daily, RANDEE Chavarria, 7 mg at 11/07/23 0833    ondansetron (ZOFRAN) injection 4 mg, 4 mg, Intravenous, Q8H PRN, RANDEE Chavarria, 4 mg at 11/01/23 1825    oxyCODONE (ROXICODONE) oral solution 2.5 mg, 2.5 mg, Oral, Q6H PRN, RANDEE Chavarria, 2.5 mg at 11/07/23 0855    oxyCODONE (ROXICODONE) oral solution 5 mg, 5 mg, Oral, Q8H, RANDEE Chavarria, 5 mg at 11/08/23 0519    QUEtiapine (SEROquel) tablet 50 mg, 50 mg, Oral, HS, RANDEE Chavarria, 50 mg at 11/07/23 2252    riTUXimab (RITUXAN) 700 mg in sodium chloride 0.9 % 280 mL subsequent titrated chemo infusion, 700 mg, Intravenous, Once, Jami Tovar MD    senna oral syrup 8.8 mg, 8.8 mg, Per NG Tube, HS, RANDEE Chavarria, 8.8 mg at 11/06/23 2147    sertraline (ZOLOFT) tablet 75 mg, 75 mg, Per PEG Tube, Daily, RANDEE Chavarria, 75 mg at 11/07/23 0831    sodium chloride 0.9 % infusion, 20 mL/hr, Intravenous, Once, Jami Tovar MD    sulfamethoxazole-trimethoprim (BACTRIM DS) 800-160 mg per tablet 1 tablet, 1 tablet, Oral, Once per day on Mon Wed Fri, RANDEE Chavarria, 1 tablet at 11/06/23 8634    Objective  /79 (BP Location: Left arm)   Pulse 102   Temp 98.7 °F (37.1 °C) (Oral)   Resp 16   Ht 5' 0.63" (1.54 m)   Wt 79.5 kg (175 lb 4.3 oz)   SpO2 93%   BMI 33.52 kg/m²   Physical Exam  Vitals and nursing note reviewed. Constitutional:       General: She is awake. She is not in acute distress. Appearance: She is not toxic-appearing. HENT:      Head: Normocephalic and atraumatic. Right Ear: External ear normal.      Left Ear: External ear normal.   Eyes:      General: No scleral icterus. Right eye: No discharge. Left eye: No discharge. Extraocular Movements: Extraocular movements intact. Conjunctiva/sclera: Conjunctivae normal.      Pupils: Pupils are equal, round, and reactive to light. Cardiovascular:      Rate and Rhythm: Normal rate. Pulmonary:      Effort: Pulmonary effort is normal. No tachypnea, bradypnea, accessory muscle usage or respiratory distress. Abdominal:      General: There is no distension. Musculoskeletal:      Cervical back: Normal range of motion. Right lower leg: No edema. Left lower leg: No edema. Skin:     General: Skin is dry.       Coloration: Skin is not pale.   Neurological:      Mental Status: She is alert and oriented to person, place, and time. Cranial Nerves: No dysarthria or facial asymmetry. Psychiatric:         Attention and Perception: Attention normal.         Mood and Affect: Affect is angry. Speech: Speech normal.         Behavior: Behavior is agitated. Thought Content: Thought content normal.         Cognition and Memory: Cognition and memory normal.         Judgment: Judgment normal.         Lab Results: I have personally reviewed pertinent labs. , CBC:   Lab Results   Component Value Date    WBC 5.08 11/08/2023    HGB 7.7 (L) 11/08/2023    HCT 24.4 (L) 11/08/2023    MCV 97 11/08/2023     (H) 11/08/2023    RBC 2.51 (L) 11/08/2023    MCH 30.7 11/08/2023    MCHC 31.6 11/08/2023    RDW 15.9 (H) 11/08/2023    MPV 9.1 11/08/2023    NRBC 0 11/08/2023   , CMP:   Lab Results   Component Value Date    SODIUM 144 11/08/2023    K 4.2 11/08/2023     11/08/2023    CO2 30 11/08/2023    BUN 41 (H) 11/08/2023    CREATININE 0.79 11/08/2023    CALCIUM 10.3 (H) 11/08/2023    AST 36 11/08/2023    ALT 59 (H) 11/08/2023    ALKPHOS 68 11/08/2023    EGFR 76 11/08/2023     Imaging Studies: I have personally reviewed pertinent reports. EKG, Pathology, and Other Studies: I have personally reviewed pertinent reports. Counseling / Coordination of Care  Total floor / unit time spent today 30 minutes. Greater than 50% of total time was spent with the patient and / or family counseling and / or coordinating of care. A description of the counseling / coordination of care: Chart reviewed, provided medical updates, determined goals of care, discussed palliative care and symptom management, discussed code status, discussed comfort care and hospice, provided anticipatory guidance, determined competency and POA/HCA, determined social/family support, provided psychosocial support.      Marium Rodriguez, MSN, CRNP, [facility]  Palliative & Supportive Care    Portions of this document may have been created using dictation software and as such some "sound alike" terms may have been generated by the system. Do not hesitate to contact me with any questions or clarifications.

## 2023-11-08 NOTE — WOUND OSTOMY CARE
Progress Note - Wound   Ashutosh Taylor 77 y.o. female MRN: [id number]  Unit/Bed#: ICU 03 Encounter: [id number]        Assessment:   Patient seen today for wound care follow up assessment. Patient is incontinent of bowel and bladder. Patient is max assist with turning from side to side for assessment. Patient is independent with meals. Assessment Findings:   Sacral/buttocks and B/L heels intact and blanching, preventative skin care orders placed. 1. Unstageable pressure injury under trach- wound continues to improve, appears partial thickness with 50% yellow slough and 50% pink granular tissue, periwound skin is intact, small amount of drainage. No induration, fluctuance, odor, warmth/temperature differences, redness, or purulence noted to the above noted wounds and skin areas assessed. New dressings applied per orders listed below. Patient tolerated well- no s/s of non-verbal pain or discomfort observed during the encounter. Bedside nurse aware of plan of care. See flow sheets for more detailed assessment findings. Wound care will continue to follow. Skin care plans:  1-Hydraguard to bilateral sacrum, buttock and heels BID and PRN  2-Elevate heels to offload pressure. 3-Ehob cushion in chair when out of bed. 4-Moisturize skin daily with skin nourishing cream.  5-Turn/reposition q2h or when medically stable for pressure re-distribution on skin. 6-Cleanse neck wound with normal saline, apply maxorb to wound, cover with split gauze, change daily and PRN soilage/displacement. Wound 09/21/23 Pressure Injury Throat Medial (Active)   Wound Image   11/08/23 0830   Wound Description Fragile;Pink;Yellow;Slough 11/08/23 0830   Pressure Injury Stage U 11/08/23 0830   Tracy-wound Assessment Clean;Dry; Intact 11/08/23 0830   Wound Length (cm) 0.3 cm 11/08/23 0830   Wound Width (cm) 1 cm 11/08/23 0830   Wound Depth (cm) 0.1 cm 11/08/23 0830   Wound Surface Area (cm^2) 0.3 cm^2 11/08/23 0830   Wound Volume (cm^3) 0.03 cm^3 11/08/23 0830   Calculated Wound Volume (cm^3) 0.03 cm^3 11/08/23 0830   Change in Wound Size % 85 11/08/23 0830   Tunneling 0 cm 11/08/23 0830   Tunneling in depth located at 0 11/08/23 0830   Undermining 0 11/08/23 0830   Undermining is depth extending from 0 11/08/23 0830   Wound Site Closure MICA 11/08/23 0830   Drainage Amount Small 11/08/23 0830   Drainage Description Serous 11/08/23 0830   Non-staged Wound Description Partial thickness 11/08/23 0830   Treatments Cleansed 11/08/23 0830   Dressing Gauze 11/08/23 0830   Wound packed? No 11/08/23 0830   Packing- # removed 0 11/08/23 0830   Packing- # inserted 0 11/08/23 0830   Dressing Changed New 11/08/23 0830   Patient Tolerance Tolerated well 11/08/23 0830   Dressing Status Clean; Intact;Dry 11/08/23 0830               Megan Newman BSN, RN, [facility]

## 2023-11-08 NOTE — ASSESSMENT & PLAN NOTE
POA in [place] [facility] ED: Swollen tongue, soft and hard palate with severe angioedema. Decadron 10 mg, Benadryl 25 mg given. 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 Plan:  Cuffless trach 9/17, cuffed Shiley XLT #7 10/3  See acute respiratory failure and oropharyngeal mass above  Continue to wean vent.

## 2023-11-08 NOTE — ASSESSMENT & PLAN NOTE
Recent Labs     11/06/23  0545 11/07/23  0433 11/08/23  0634   K 5.1 4.9 4.2   MG 1.8*  --  1.7*         Workup:  Etiology: Patient takes bactrim. Tumor lysis would be less likely since calcium is elevated rather than decreased. Uric acid is normal.   K+ elevated but returned to normal after medical management. Will give another round of medical management if elevated again. Plan:  Trend potassium on daily BMP. Magnesium sulphate 2 mg IV replaced.

## 2023-11-08 NOTE — ASSESSMENT & PLAN NOTE
Lab Results   Component Value Date    EGFR 76 11/08/2023    EGFR 51 11/07/2023    EGFR 61 11/06/2023    CREATININE 0.79 11/08/2023    CREATININE 1.09 11/07/2023    CREATININE 0.94 11/06/2023     Baseline Cr between 0.75-1.1  Initial HERIBERTO felt 2/2 prerenal azotemia 2/2 diuresis, reduced PO intake +/- ATN. Patient received 2 doses of Bumex IV 2 g as she had not been responding appropriately to IV 40 Lasix daily. Creatinine went up by 0.5 meeting criteria for an HERIBERTO. This may be prerenal intrinsic or postrenal.  Potential prerenal causes include reduced kidney perfusion due to lisinopril. Intrinsic can also be lisinopril due to ATN, AIN from chemo medications, TLS, crystaline nephropathy from acyclovir, rituxan nephrotoxicity, filgrastim glomerulonephritis. Post renal obstructive could be from urine retention from vincristine or cyclophosphamide bladder fibrosis. Suspect most likely due to medications as a combination of prerenal and intrinsic. FENa was 0.2%, UA shows no casts or signs of infection, urine eosinophils 0%. Patient likely has prerenal HERIBERTO from volume depletion. Received 2 L NS and 1 pRBC and cr went up by 0.07 still and Na and Cl went down, suspect that volume prevented further cr rise and free water excretion impaired by kidney function, allowing hyponatremia to increase. Creatinine increase is likely plateaued and went down the following day. Suspect that now that nephrotoxic medications have been stopped she responded well to Lasix and creatinine downtrended. HERIBERTO now resolved. Will be cautious about nephrotoxins and monitor as restarting EPOCH and ppx. Patient gets volume depleted and overloaded very easily. Especially from chemo. 10/19 Heriberto as cr went up by 0.3 in 48 hours from 0.82 on 10/17 to 1.22 on 10/19. Suspect this is prerenal hypovolemic, will see if patient improves with fluids. She gets overloaded easily so if no improvement suspect CRS again. 10/29 HERIBERTO cr went up by 0.3 again.  She has 922 ml UOP in last 24 hours. PO likely from lasix 40mg IV given yesterday. She is not hypovolemic. Plan:   Continue to monitor UO/renal function. Avoid nephrotoxic agents  Continue to monitor a.m. BMP.

## 2023-11-08 NOTE — PROGRESS NOTES
[address] [facility]  Progress Note  Name: Ayde Sorensen  MRN: [id number]  Unit/Bed#: ICU 03 I Date of Admission: 9/13/2023   Date of Service: 11/8/2023 I Hospital Day: 64    Assessment/Plan   * Acute respiratory failure with hypoxia secondary to oropharyngeal mass  Assessment & Plan  Cuffless trach placed 9/17, transitioned to cuffed on 10/3. Oxygen requirements not improving. For mental health patient is on buspirone, quetiapine, and sertraline. For pain, gabapentin, oxycodone scheduled and prn, prn oxycodone mainly utilized for increased pain during chemo and after a bronch. On Guaifenesin, Atrovent and Xopenex for airway maintenance. No improvement in bilateral consolidation or atelectasis and groundglass opacities on repeat CT and chest x-rays. Bronchial cx with 3 colonies CoNS. PCP PCR invalid, repeat negative. CTCAP 10/12:  indicates additional groundglass opacity with paraseptal emphysema, which may be contributing to inability to remain off vent. Bronchoscopy performed and unremarkable. Samples sent to lab for culture. Patient was placed back on vent s/p procedure. Now on high flow. Completed 7 day course of cefepime and Vancomycin. Bronchial culture and gram stain negative. CMV, AFB and Pneumocystis jiroveci (carinii) negative. Cuffless trach was placed 9/17. Replaced with a cuffed Shiley XLT #7 10/3    Plan:  ID consult  Pending Fungitell, urine histoplasma antigen, Aspergillus galactomannan antigen and antibody. Considering Chemo induced pneumonitis. Will discuss with heme onc. Patient on 100 prednisone BID  Continue trach collar during the day and at night. Continue her on dysphagia 2 diet. Wean down HFNC to a goal FiO2 of 50% to be discharged  Palliative on board to discuss goals of care.     Hyperkalemia  Assessment & Plan  Recent Labs     11/06/23  0545 11/07/23  0433 11/08/23  0634   K 5.1 4.9 4.2   MG 1.8*  --  1.7*         Workup:  Etiology: Patient takes bactrim. Tumor lysis would be less likely since calcium is elevated rather than decreased. Uric acid is normal.   K+ elevated but returned to normal after medical management. Will give another round of medical management if elevated again. Plan:  Trend potassium on daily BMP. Magnesium sulphate 2 mg IV replaced. Large cell lymphoma ([address])  Assessment & Plan  Large B-cell lymphoma, stage II. First chemo cycle 9/22 4 days of R-EPOCH. 9/27 Rituxan. 9/28 Filgrastim. Received nivestym 300 mc 7 days dcd on 10/26      Prophylaxis:  Bactrim prophylaxis Monday Wednesday Friday,  Acyclovir 400 mg twice daily  Fluconazole 200 mg daily  Levofloxacin 250 mg daily  Lovenox 40 mg daily    9/22  4 days of R-EPOCH.  9/27 Rituxan  10/13 for [facility] cycle 2, which was done over 4 days and cytoxan on 10/19.  11/6 completed R-EPOCH cycle 3. Plan:  She will receive Cytoxan on 11/7. Oncology noted that she will require a 2 L fluid bolus before cytoxan. Ok to give bolus. Oncology is following. Management as per onc team.  Transfuse blood products as needed. Keep Hgb>7 and Plt>20 for chemo   See acute respiratory failure above    Oropharyngeal mass  Assessment & Plan  9/14 Neck/ soft tissue CT: Large enhancing soft tissue mass identified within the left neck involving multiple spaces. Centered within the left oropharynx with extensions as described above into multiple spaces. This results in significant airway compromise. 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 Small level 3/level 4 nodes are seen within the neck. Tracheostomy by ENT. Replaced on 9/26. H/o tobacco abuse, daily smoker. On nicotine while inpatient, confirmed with patient she needs nicotine patch. CT soft tissue neck shows reduced mass effect from 9/14 to 10/13. Can consider reducing trach size if continues to improve    Plan:  ENT and heme onc following  As per speech therapist diet  Dysphagia 2. Continue parallel PEG tube feeds for nutrition.    See acute respiratory failure and large B cell lymphoma above. RML pneumonia-resolved as of 9/22/2023  Assessment & Plan  9/13 CT PE study: Patchy consolidation right middle lobe, new from 8/25/2023, compatible with pneumonia. No leukocytosis, no fever/chills. Positive for sore throat, cough. New onset pneumonia after recent hospitalization and antibiotics treatment. 9/14: Bronchoscopy/ ABL. MRSA negative. ABL revealed 2+ Growth of Candida albicans, suspected contaminant. Pancytopenia due to chemotherapy (HCC)-resolved as of 11/3/2023  Assessment & Plan  Patient received 1 PRBC transfusion on 10/5. Hemoglobin 8.2 s/p 1 PRBC transfusion on 10/25. B/L is 12-14. No sites of bleeding, no black stools. Iron panel indicative of inflammatory anemia. Normal Vitamin B12 levels, negative hemolysis smear. Folate is low 5.5. Plan:  Trend hemoglobin and transfuse for <7. As per heme/onc transfuse irradiated and leukoreduced blood products. Trend platelets  Folic acid 1mg daily supplementation  As per my conversation with Heme/oncology attending, patient is expected to have chemotherapy associated pancytopenia. No further anemia w/u required. PO (acute kidney injury) (HCC)-resolved as of 9/21/2023  Assessment & Plan  Lab Results   Component Value Date    CREATININE 0.79 11/08/2023    CREATININE 1.09 11/07/2023    CREATININE 0.94 11/06/2023       Likely prerenal.  Second to poor oral intake versus poor urine output. Baseline creatinine 1 to 1.2. Cr at baseline  Plan:  Urinary retention protocol, Daily weights, I/O  Trend Cr daily    Blister (nonthermal) of left forearm, sequela  Assessment & Plan  Blisters of left upper extremity healing, no signs of infection, continue daily wound care. Generalized abdominal pain  Assessment & Plan  Patient reports abdominal pain which is unchanged since 9/22 no guarding on exam. Takes Famotidine for GERD at home. Alk phos 10/2 145, 10/20 79.  CTCAP reveals complex right upper pole renal lesion requiring possible outpatient MRI    Continue Famotidine  On bowel regimen with Senna and Miralax prn    (HFpEF) heart failure with preserved ejection fraction [facility])  Assessment & Plan  Wt Readings from Last 3 Encounters:   11/08/23 79.5 kg (175 lb 4.3 oz)   09/07/23 74.6 kg (164 lb 6.4 oz)   08/30/23 73.3 kg (161 lb 9.6 oz)     Lab Results   Component Value Date    LVEF 60 08/28/2023     (H) 10/28/2023     (H) 09/29/2023     Echo 8/28/23: LVEF 60%. Plan:  K > 4   Measure I/O      Ambulatory dysfunction  Assessment & Plan  2/2 Impacted by chronic pain syndrome + prolonged hospital stay. Continue OOB with PT. PT rec post acute rehab however reportedly Cannot get LTACH placement while getting chemo per CM  She is aware STR SNFs continue to follow and treatment can be done from a SNF level of care. PT would need to be on trach collar for at least 72 hours with no need for the vent. Aware that pt would need to be around 28% FiO2     Depression  Assessment & Plan  Patient on Zoloft 75 daily and Seroquel hs  Patient is depressed, multiple family/palliative discussions. patient is firm that she wants to live and to fight, she is just tired. Currently goal is disease treatment oriented. Full code. No SI/HI ideations. Palliative signed off, will reconsult if patient changes her mind regarding wishes. Chronic Superficial thrombophlebitis  Assessment & Plan  Right forearm soreness. RUE US: No acute or chronic deep vein thrombosis. 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 PO not severe enough to require renal dosing at this time, will continue to monitor and adjust dosing as needed.       Continue DVT ppx with Lovenox 40    CKD (chronic kidney disease) stage 3, GFR 30-59 ml/min [facility])  Assessment & Plan  Lab Results   Component Value Date    EGFR 76 11/08/2023    EGFR 51 11/07/2023    EGFR 61 11/06/2023    CREATININE 0.79 11/08/2023 CREATININE 1.09 11/07/2023    CREATININE 0.94 11/06/2023     Baseline Cr between 0.75-1.1  Initial HERIBERTO felt 2/2 prerenal azotemia 2/2 diuresis, reduced PO intake +/- ATN. Patient received 2 doses of Bumex IV 2 g as she had not been responding appropriately to IV 40 Lasix daily. Creatinine went up by 0.5 meeting criteria for an HERIBERTO. This may be prerenal intrinsic or postrenal.  Potential prerenal causes include reduced kidney perfusion due to lisinopril. Intrinsic can also be lisinopril due to ATN, AIN from chemo medications, TLS, crystaline nephropathy from acyclovir, rituxan nephrotoxicity, filgrastim glomerulonephritis. Post renal obstructive could be from urine retention from vincristine or cyclophosphamide bladder fibrosis. Suspect most likely due to medications as a combination of prerenal and intrinsic. FENa was 0.2%, UA shows no casts or signs of infection, urine eosinophils 0%. Patient likely has prerenal HERIBERTO from volume depletion. Received 2 L NS and 1 pRBC and cr went up by 0.07 still and Na and Cl went down, suspect that volume prevented further cr rise and free water excretion impaired by kidney function, allowing hyponatremia to increase. Creatinine increase is likely plateaued and went down the following day. Suspect that now that nephrotoxic medications have been stopped she responded well to Lasix and creatinine downtrended. HERIBERTO now resolved. Will be cautious about nephrotoxins and monitor as restarting EPOCH and ppx. Patient gets volume depleted and overloaded very easily. Especially from chemo. 10/19 Heriberto as cr went up by 0.3 in 48 hours from 0.82 on 10/17 to 1.22 on 10/19. Suspect this is prerenal hypovolemic, will see if patient improves with fluids. She gets overloaded easily so if no improvement suspect CRS again. 10/29 HERIBERTO cr went up by 0.3 again. She has 922 ml UOP in last 24 hours. HERIBERTO likely from lasix 40mg IV given yesterday. She is not hypovolemic.     Plan:   Continue to monitor UO/renal function. Avoid nephrotoxic agents  Continue to monitor a.m. BMP. Pain syndrome, chronic  Assessment & Plan  Home regimen: Oxycodone 5 mg BID, Tylenol 650 mg q8 prn. Gabapentin 600 mg TID. Medical marijuana. Lumbar radiculopathy and impaired ambulatory dysfunction secondary to pain. Has bowel regimen and is having bowel movements daily. Patient required additional pain management during previous cycle and has some additional pain from tunneled line placement    Plan:  Continue gabapentin 300 mg TID, oxycodone 5 mg TID, prn Tylenol 650 mg q6. Oxycodone 5mg every 8 hours  Oxycodone 2.5 mg q6 PRN    Benign essential hypertension  Assessment & Plan  Home regimen Coreg 12.5 mg BID, Amlodipine 10 mg daily, and lisinopril 40 mg. All discontinued at this time secondary to hypotension and PO since cycle 1. but stable currently without any antihypertensives. Systolic persistently elevated and tachycardic, potentially secondary to pain. Patient was more hypotensive during last chemo. Coreg contraindicated during chemo, since cycles are every other week, would be better to stick with lopressor during duration of therapy as opposed to switching constantly. Plan:  Monitor vitals. Continue Norvasc 10 and lopressor 25 bid  Prn hydralazine for SBP > 160    Angioedema-resolved as of 11/4/2023  Assessment & Plan  POA in [place] [facility] ED: Swollen tongue, soft and hard palate with severe angioedema. Decadron 10 mg, Benadryl 25 mg given. 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 Plan:  Cuffless trach 9/17, cuffed Shiley XLT #7 10/3  See acute respiratory failure and oropharyngeal mass above  Continue to wean vent. Hyperlipidemia-resolved as of 10/26/2023  Assessment & Plan  Lab Results   Component Value Date    CHOLESTEROL 118 10/09/2023    HDL 14 (L) 10/09/2023    TRIG 144 10/09/2023    [address] 104 10/09/2023     Continue outpatient diet and lifestyle modification.     COPD without exacerbation (HCC)-resolved as of 9/26/2023  Assessment & Plan  Continue vent with nebs. Wean off vent. Encephalopathy-resolved as of 9/21/2023  Assessment & Plan  9/19- Pt appeared to be AAO x3. Improving. ICU Core Measures     Vented Patient  VAP Bundle  VAP bundle ordered       A: Assess, Prevent, and Manage Pain Has pain been assessed? Yes  Need for changes to pain regimen? No   B: Both Spontaneous Awakening Trials (SATs) and Spontaneous Breathing Trials (SBTs) Plan to perform spontaneous awakening trial today? N/A       C: Choice of Sedation RASS Goal: 0 Alert and Calm  Need for changes to sedation or analgesia regimen? NA   D: Delirium CAM-ICU: Negative   E: Early Mobility  Plan for early mobility? Yes   F: Family Engagement Plan for family engagement today? Yes       Antibiotic Review: Patient on appropriate coverage based on culture data. Review of Invasive Devices:      Central access plan: Medications requiring central line      Prophylaxis:  VTE VTE covered by:  enoxaparin, Subcutaneous, 40 mg at 11/07/23 0830       Stress Ulcer  covered byfamotidine (PEPCID) tablet 20 mg [[id number]]       Subjective   HPI/24hr events: No events overnight. She is on 70% 12L oxygen via trach mask. Review of systems is unremarkable. Objective                            Vitals I/O      Most Recent Min/Max in 24hrs   Temp 98.7 °F (37.1 °C) Temp  Min: 98.1 °F (36.7 °C)  Max: 98.8 °F (37.1 °C)   Pulse 102 Pulse  Min: 74  Max: 105   Resp 16 Resp  Min: 16  Max: 20   /79 BP  Min: 129/75  Max: 168/79   O2 Sat 93 % SpO2  Min: 90 %  Max: 100 %      Intake/Output Summary (Last 24 hours) at 11/8/2023 0804  Last data filed at 11/8/2023 0739  Gross per 24 hour   Intake 75 ml   Output 1675 ml   Net -1600 ml         Diet Dysphagia/Modified Consistency;  Dysphagia 2-Mechanical Soft; Honey Thick Liquid     Invasive Monitoring Physical exam    Physical Exam  Eyes:      Pupils: Pupils are equal, round, and reactive to light. HENT:      Head: Normocephalic and atraumatic. Neck:      Trachea: Tracheostomy present. Comments: High flow  Constitutional:       Appearance: She is ill-appearing. Neurological:      Mental Status: She is alert. She is agitated.             Diagnostic Studies      EKG: NSR  Imaging: No      Medications:  Scheduled PRN   acetaminophen, 650 mg, Once  acyclovir, 400 mg, BID  amLODIPine, 10 mg, Daily  budesonide, 0.5 mg, Q12H  busPIRone, 30 mg, TID  chlorhexidine, 15 mL, Q12H ASHLEY  diphenhydrAMINE (BENADRYL) 25 mg in sodium chloride 0.9 % 50 mL IVPB, 25 mg, Once  enoxaparin, 40 mg, Q24H ASHLEY  famotidine, 20 mg, BID  fentanyl citrate (PF), 100 mcg, Once  Filgrastim-aafi, 300 mcg, Once  fluconazole, 268 mg, Daily  folic acid, 1 mg, Daily  formoterol, 20 mcg, Q12H  furosemide, 40 mg, BID (diuretic)  gabapentin, 300 mg, TID  levalbuterol, 1.25 mg, Q6H   And  ipratropium, 0.5 mg, Q6H  magnesium sulfate, 2 g, Once  melatonin, 3 mg, HS  metoprolol tartrate, 25 mg, Q12H ASHLEY  midazolam, 2 mg, Once  nicotine, 7 mg, Daily  oxyCODONE, 5 mg, Q8H  QUEtiapine, 50 mg, HS  riTUXimab (RITUXAN) 700 mg in sodium chloride 0.9 % 280 mL subsequent titrated chemo infusion, 700 mg, Once  senna, 8.8 mg, HS  sertraline, 75 mg, Daily  sodium chloride, 20 mL/hr, Once  sulfamethoxazole-trimethoprim, 1 tablet, Once per day on Mon Wed Fri      alteplase, 2 mg, Q1MIN PRN  alteplase, 2 mg, Q1MIN PRN  alteplase, 2 mg, Q1MIN PRN  ondansetron, 4 mg, Q8H PRN  oxyCODONE, 2.5 mg, Q6H PRN       Continuous          Labs:    CBC    Recent Labs     11/07/23  0433 11/08/23  0634   WBC 7.14 5.08   HGB 7.8* 7.7*   HCT 25.5* 24.4*   * 419*     BMP    Recent Labs     11/07/23  0433 11/08/23  0634   SODIUM 146 144   K 4.9 4.2    107   CO2 29 30   AGAP 11 7   BUN 62* 41*   CREATININE 1.09 0.79   CALCIUM 10.1 10.3*       Coags    No recent results     Additional Electrolytes  Recent Labs     11/06/23  1322 11/07/23  0433 11/08/23  0634   MG  --   --  1.7*   PHOS  --  5.2* 3.6   CAIONIZED 1.33*  --   --           Blood Gas    No recent results  No recent results LFTs  Recent Labs     11/07/23 0433 11/08/23 0634   ALT 34 59*   AST 19 36   ALKPHOS 72 68   ALB 3.6 3.5   TBILI 0.37 0.47       Infectious  No recent results  Glucose  Recent Labs     11/07/23 0433 11/08/23 0634   GLUC 136 108                 Jose Alfredo Sosa MD

## 2023-11-08 NOTE — ASSESSMENT & PLAN NOTE
Lab Results   Component Value Date    CREATININE 0.79 11/08/2023    CREATININE 1.09 11/07/2023    CREATININE 0.94 11/06/2023       Likely prerenal.  Second to poor oral intake versus poor urine output. Baseline creatinine 1 to 1.2.     Cr at baseline  Plan:  Urinary retention protocol, Daily weights, I/O  Trend Cr daily

## 2023-11-08 NOTE — CASE MANAGEMENT
Case Management Progress Note    Patient name Joseph Mcgarry  Location ICU 03/ICU 03 MRN [id number]  : 1955 Date 2023       LOS (days): 56  Geometric Mean LOS (GMLOS) (days): 26.10  Days to GMLOS:-29.9        OBJECTIVE:    Current admission status: Inpatient  Preferred Pharmacy:   [id number] [place] - [address]. [address] [facility] [address]  Phone: [phone] Fax: [phone] [facility] ([facility] [address] [address]  Phone: [phone] Fax: [phone]    Primary Care Provider: Soha Stephen MD    Primary Insurance: [employer] REP  Secondary Insurance: [address] [facility]    PROGRESS NOTE:    CM spoke with Alisaagnes Cardenas - RT with [facility]. He will review and notify CM if pt could transition safely home with her O2 requirements or if she would qualify for a NIV in the event she would want to dc to home. CM met with pt and her daughter, Macie Bowens, at bedside. Discussion held regarding dcp:     LTACH: SHIRA reviewed that LTACH is a referral normally sent for a pt that needs a long vent/O2 wean. However, they are unable to accept while a pt is receiving chemo treatment. IF this option was to be considered Chemo would have to be on hold for the duration for treatment at [facility], [facility]. [facility]: SHIRA also discussed that [facility] does continue to follow. Pt is aware of requirements of her O2 in order to transition to their facility. CM reviewed when pt is ready for STR that an insurance authorization would be needed prior to the transition. CM did review that going to a STR at a SNF is an option while getting Chemo because it can be carved out with her advantage plan. Home: CM did review if pt were to dc home this would need to be done safely.    CM reviewed that CM did reach out to the RT with [facility] to see what would be needed in the event pt would want to return home. CM did review that the HHA through the waiver could not assist with Trach/PEG care at home. That this would need to be managed by a nurse that is approved through the waiver or with family. Daughter does report good support between herself and her sister in law who is an LPN. CM did review pt would be eligible for VNA, but the RN would be there to to teach and provide assessment/oversight. They would also be available on-call. Pt and daughter are also aware that pt Chemo is around every 3 weeks. That this requires a 4 day IP stay at the hospital for pt to be monitored after getting Chemo. They also understand that the duration of chemo is pending pt response to it. Pt is aware that CM continues to update her , Mohini Copeland. At this time no further questions. Pt is frustrated with her situation and her long LOS here in the ICU. Pt would like to be able to move out of the ICU to another room. Pt would like to better understand what goals she would need to reach to transition. Pt aware CM available for any questions and will keep her updated on options for dc. At this time pt and her daughter both agree that STR at [facility] is the best option if pt can transition to their level of care.

## 2023-11-08 NOTE — ASSESSMENT & PLAN NOTE
Cuffless trach placed 9/17, transitioned to cuffed on 10/3. Oxygen requirements not improving. For mental health patient is on buspirone, quetiapine, and sertraline. For pain, gabapentin, oxycodone scheduled and prn, prn oxycodone mainly utilized for increased pain during chemo and after a bronch. On Guaifenesin, Atrovent and Xopenex for airway maintenance. No improvement in bilateral consolidation or atelectasis and groundglass opacities on repeat CT and chest x-rays. Bronchial cx with 3 colonies CoNS. PCP PCR invalid, repeat negative. CTCAP 10/12:  indicates additional groundglass opacity with paraseptal emphysema, which may be contributing to inability to remain off vent. Bronchoscopy performed and unremarkable. Samples sent to lab for culture. Patient was placed back on vent s/p procedure. Now on high flow. Completed 7 day course of cefepime and Vancomycin. Bronchial culture and gram stain negative. CMV, AFB, Fungitell and Pneumocystis jiroveci (carinii) negative. Cuffless trach was placed 9/17. Replaced with a cuffed Shiley XLT #7 10/3    Plan:  ID consult  Pending urine histoplasma antigen, Aspergillus galactomannan antigen and antibody. Considering Chemo induced pneumonitis. Continue on Pulmicort,   Formoterol nebulization every 12 hours. Levalbuterol and ipratropium every 6 hours. Continue trach collar during the day and at night. Continue her on dysphagia 2 diet. Wean down HFNC to a goal FiO2 of 50% to be discharged  Palliative on board to discuss goals of care.   Patient motivated to work with PT.

## 2023-11-08 NOTE — ASSESSMENT & PLAN NOTE
Large B-cell lymphoma, stage II. First chemo cycle 9/22 4 days of R-EPOCH. 9/27 Rituxan. 9/28 Filgrastim. Received nivestym 300 mc 7 days dcd on 10/26      Prophylaxis:  Bactrim prophylaxis Monday Wednesday Friday,  Acyclovir 400 mg twice daily  Fluconazole 200 mg daily  Levofloxacin 250 mg daily  Lovenox 40 mg daily    9/22  4 days of R-EPOCH.  9/27 Rituxan  10/13 for [facility] cycle 2, which was done over 4 days and cytoxan on 10/19.  11/6 completed R-EPOCH cycle 3. Plan:  She will receive Cytoxan on 11/7. Oncology noted that she will require a 2 L fluid bolus before cytoxan. Ok to give bolus. Oncology is following. Management as per onc team.  Transfuse blood products as needed.   Keep Hgb>7 and Plt>20 for chemo   See acute respiratory failure above

## 2023-11-09 LAB
A ALTERNATA IGE QN: <0.1 KUA/I
A FUMIGATUS IGE QN: <0.1 KUA/I
ANION GAP SERPL CALCULATED.3IONS-SCNC: 5 MMOL/L
ATRIAL RATE: 83 BPM
ATRIAL RATE: 85 BPM
BASE EX.OXY STD BLDV CALC-SCNC: 69.3 % (ref 60–80)
BASE EXCESS BLDV CALC-SCNC: 5.7 MMOL/L
BERMUDA GRASS IGE QN: <0.1 KUA/I
BOXELDER IGE QN: <0.1 KUA/I
BUN SERPL-MCNC: 40 MG/DL (ref 5–25)
C HERBARUM IGE QN: <0.1 KUA/I
CALCIUM SERPL-MCNC: 10 MG/DL (ref 8.4–10.2)
CARDIAC TROPONIN I PNL SERPL HS: 20 NG/L (ref 8–18)
CAT DANDER IGE QN: <0.1 KUA/I
CHLORIDE SERPL-SCNC: 105 MMOL/L (ref 96–108)
CMN PIGWEED IGE QN: <0.1 KUA/I
CO2 SERPL-SCNC: 31 MMOL/L (ref 21–32)
COMMON RAGWEED IGE QN: <0.1 KUA/I
COTTONWOOD IGE QN: <0.1 KUA/I
CREAT SERPL-MCNC: 0.97 MG/DL (ref 0.6–1.3)
D FARINAE IGE QN: <0.1 KUA/I
D PTERONYSS IGE QN: <0.1 KUA/I
DOG DANDER IGE QN: <0.1 KUA/I
ERYTHROCYTE [DISTWIDTH] IN BLOOD BY AUTOMATED COUNT: 15.9 % (ref 11.6–15.1)
GALACTOMANNAN AG SPEC IA-ACNC: 0.28 INDEX (ref 0–0.49)
GFR SERPL CREATININE-BSD FRML MDRD: 59 ML/MIN/1.73SQ M
GLUCOSE SERPL-MCNC: 95 MG/DL (ref 65–140)
HCO3 BLDV-SCNC: 31.6 MMOL/L (ref 24–30)
HCT VFR BLD AUTO: 23.6 % (ref 34.8–46.1)
HGB BLD-MCNC: 7.6 G/DL (ref 11.5–15.4)
LDH SERPL-CCNC: 173 U/L (ref 140–271)
LONDON PLANE IGE QN: <0.1 KUA/I
MAGNESIUM SERPL-MCNC: 3.1 MG/DL (ref 1.9–2.7)
MCH RBC QN AUTO: 31.4 PG (ref 26.8–34.3)
MCHC RBC AUTO-ENTMCNC: 32.2 G/DL (ref 31.4–37.4)
MCV RBC AUTO: 98 FL (ref 82–98)
MOUSE URINE PROT IGE QN: <0.1 KUA/I
MT JUNIPER IGE QN: <0.1 KUA/I
MUGWORT IGE QN: <0.1 KUA/I
O2 CT BLDV-SCNC: 8.1 ML/DL
P AXIS: 57 DEGREES
P AXIS: 72 DEGREES
P NOTATUM IGE QN: <0.1 KUA/I
PCO2 BLDV: 54.7 MM HG (ref 42–50)
PH BLDV: 7.38 [PH] (ref 7.3–7.4)
PHOSPHATE SERPL-MCNC: 4.5 MG/DL (ref 2.3–4.1)
PLATELET # BLD AUTO: 401 THOUSANDS/UL (ref 149–390)
PMV BLD AUTO: 9 FL (ref 8.9–12.7)
PO2 BLDV: 38.4 MM HG (ref 35–45)
POTASSIUM SERPL-SCNC: 3.8 MMOL/L (ref 3.5–5.3)
PR INTERVAL: 166 MS
PR INTERVAL: 166 MS
QRS AXIS: -24 DEGREES
QRS AXIS: 54 DEGREES
QRSD INTERVAL: 84 MS
QRSD INTERVAL: 94 MS
QT INTERVAL: 378 MS
QT INTERVAL: 378 MS
QTC INTERVAL: 444 MS
QTC INTERVAL: 449 MS
RBC # BLD AUTO: 2.42 MILLION/UL (ref 3.81–5.12)
ROACH IGE QN: 0.15 KUA/I
SHEEP SORREL IGE QN: <0.1 KUA/I
SILVER BIRCH IGE QN: <0.1 KUA/I
SODIUM SERPL-SCNC: 141 MMOL/L (ref 135–147)
T WAVE AXIS: 50 DEGREES
T WAVE AXIS: 53 DEGREES
TIMOTHY IGE QN: <0.1 KUA/I
TOTAL IGE SMQN RAST: 33.5 KU/L (ref 0–113)
URATE SERPL-MCNC: 5.2 MG/DL (ref 2–7.5)
VENTRICULAR RATE: 83 BPM
VENTRICULAR RATE: 85 BPM
WALNUT IGE QN: <0.1 KUA/I
WBC # BLD AUTO: 9.76 THOUSAND/UL (ref 4.31–10.16)
WHITE ASH IGE QN: <0.1 KUA/I
WHITE ELM IGE QN: <0.1 KUA/I
WHITE MULBERRY IGE QN: <0.1 KUA/I
WHITE OAK IGE QN: <0.1 KUA/I

## 2023-11-09 PROCEDURE — 97116 GAIT TRAINING THERAPY: CPT

## 2023-11-09 PROCEDURE — 93005 ELECTROCARDIOGRAM TRACING: CPT

## 2023-11-09 PROCEDURE — 94640 AIRWAY INHALATION TREATMENT: CPT

## 2023-11-09 PROCEDURE — 84550 ASSAY OF BLOOD/URIC ACID: CPT | Performed by: INTERNAL MEDICINE

## 2023-11-09 PROCEDURE — 94760 N-INVAS EAR/PLS OXIMETRY 1: CPT

## 2023-11-09 PROCEDURE — 83735 ASSAY OF MAGNESIUM: CPT

## 2023-11-09 PROCEDURE — 85027 COMPLETE CBC AUTOMATED: CPT

## 2023-11-09 PROCEDURE — 82805 BLOOD GASES W/O2 SATURATION: CPT

## 2023-11-09 PROCEDURE — 83615 LACTATE (LD) (LDH) ENZYME: CPT | Performed by: INTERNAL MEDICINE

## 2023-11-09 PROCEDURE — 93010 ELECTROCARDIOGRAM REPORT: CPT | Performed by: INTERNAL MEDICINE

## 2023-11-09 PROCEDURE — 84100 ASSAY OF PHOSPHORUS: CPT | Performed by: INTERNAL MEDICINE

## 2023-11-09 PROCEDURE — 84484 ASSAY OF TROPONIN QUANT: CPT

## 2023-11-09 PROCEDURE — 99291 CRITICAL CARE FIRST HOUR: CPT | Performed by: INTERNAL MEDICINE

## 2023-11-09 PROCEDURE — 80048 BASIC METABOLIC PNL TOTAL CA: CPT

## 2023-11-09 PROCEDURE — 94003 VENT MGMT INPAT SUBQ DAY: CPT

## 2023-11-09 RX ORDER — MAGNESIUM SULFATE HEPTAHYDRATE 40 MG/ML
2 INJECTION, SOLUTION INTRAVENOUS ONCE
Status: COMPLETED | OUTPATIENT
Start: 2023-11-09 | End: 2023-11-09

## 2023-11-09 RX ORDER — LANOLIN ALCOHOL/MO/W.PET/CERES
800 CREAM (GRAM) TOPICAL ONCE
Status: COMPLETED | OUTPATIENT
Start: 2023-11-09 | End: 2023-11-09

## 2023-11-09 RX ORDER — ALBUMIN, HUMAN INJ 5% 5 %
SOLUTION INTRAVENOUS
Status: COMPLETED
Start: 2023-11-09 | End: 2023-11-09

## 2023-11-09 RX ORDER — ALBUMIN, HUMAN INJ 5% 5 %
12.5 SOLUTION INTRAVENOUS ONCE
Status: COMPLETED | OUTPATIENT
Start: 2023-11-09 | End: 2023-11-10

## 2023-11-09 RX ADMIN — IPRATROPIUM BROMIDE 0.5 MG: 0.5 SOLUTION RESPIRATORY (INHALATION) at 13:26

## 2023-11-09 RX ADMIN — ACYCLOVIR 400 MG: 200 CAPSULE ORAL at 18:01

## 2023-11-09 RX ADMIN — BUSPIRONE HYDROCHLORIDE 30 MG: 10 TABLET ORAL at 08:57

## 2023-11-09 RX ADMIN — NICOTINE 7 MG: 7 PATCH, EXTENDED RELEASE TRANSDERMAL at 09:25

## 2023-11-09 RX ADMIN — IPRATROPIUM BROMIDE 0.5 MG: 0.5 SOLUTION RESPIRATORY (INHALATION) at 21:07

## 2023-11-09 RX ADMIN — FUROSEMIDE 40 MG: 10 INJECTION, SOLUTION INTRAMUSCULAR; INTRAVENOUS at 16:06

## 2023-11-09 RX ADMIN — OXYCODONE HYDROCHLORIDE 5 MG: 5 SOLUTION ORAL at 21:08

## 2023-11-09 RX ADMIN — BUDESONIDE 0.5 MG: 0.5 INHALANT ORAL at 07:07

## 2023-11-09 RX ADMIN — FORMOTEROL FUMARATE DIHYDRATE 20 MCG: 20 SOLUTION RESPIRATORY (INHALATION) at 21:07

## 2023-11-09 RX ADMIN — FLUCONAZOLE 400 MG: 100 TABLET ORAL at 08:58

## 2023-11-09 RX ADMIN — LEVALBUTEROL HYDROCHLORIDE 1.25 MG: 1.25 SOLUTION RESPIRATORY (INHALATION) at 02:18

## 2023-11-09 RX ADMIN — LEVALBUTEROL HYDROCHLORIDE 1.25 MG: 1.25 SOLUTION RESPIRATORY (INHALATION) at 13:26

## 2023-11-09 RX ADMIN — OXYCODONE HYDROCHLORIDE 5 MG: 5 SOLUTION ORAL at 05:08

## 2023-11-09 RX ADMIN — ALBUMIN (HUMAN) 12.5 G: 12.5 INJECTION, SOLUTION INTRAVENOUS at 22:34

## 2023-11-09 RX ADMIN — ALBUMIN, HUMAN INJ 5% 12.5 G: 5 SOLUTION at 22:34

## 2023-11-09 RX ADMIN — GABAPENTIN 300 MG: 300 CAPSULE ORAL at 08:58

## 2023-11-09 RX ADMIN — LEVALBUTEROL HYDROCHLORIDE 1.25 MG: 1.25 SOLUTION RESPIRATORY (INHALATION) at 21:07

## 2023-11-09 RX ADMIN — CHLORHEXIDINE GLUCONATE 15 ML: 1.2 SOLUTION ORAL at 21:08

## 2023-11-09 RX ADMIN — GABAPENTIN 300 MG: 300 CAPSULE ORAL at 21:08

## 2023-11-09 RX ADMIN — METOPROLOL TARTRATE 25 MG: 25 TABLET, FILM COATED ORAL at 21:08

## 2023-11-09 RX ADMIN — ENOXAPARIN SODIUM 40 MG: 40 INJECTION SUBCUTANEOUS at 08:57

## 2023-11-09 RX ADMIN — BUDESONIDE 0.5 MG: 0.5 INHALANT ORAL at 21:08

## 2023-11-09 RX ADMIN — FOLIC ACID 1 MG: 1 TABLET ORAL at 08:58

## 2023-11-09 RX ADMIN — SODIUM CHLORIDE SOLN NEBU 3% 4 ML: 3 NEBU SOLN at 07:07

## 2023-11-09 RX ADMIN — SODIUM CHLORIDE SOLN NEBU 3% 4 ML: 3 NEBU SOLN at 13:26

## 2023-11-09 RX ADMIN — FORMOTEROL FUMARATE DIHYDRATE 20 MCG: 20 SOLUTION RESPIRATORY (INHALATION) at 07:08

## 2023-11-09 RX ADMIN — BUSPIRONE HYDROCHLORIDE 30 MG: 10 TABLET ORAL at 21:10

## 2023-11-09 RX ADMIN — Medication 800 MG: at 01:36

## 2023-11-09 RX ADMIN — IPRATROPIUM BROMIDE 0.5 MG: 0.5 SOLUTION RESPIRATORY (INHALATION) at 02:18

## 2023-11-09 RX ADMIN — AMLODIPINE BESYLATE 10 MG: 5 TABLET ORAL at 08:57

## 2023-11-09 RX ADMIN — METOPROLOL TARTRATE 25 MG: 25 TABLET, FILM COATED ORAL at 08:58

## 2023-11-09 RX ADMIN — ACYCLOVIR 400 MG: 200 CAPSULE ORAL at 10:02

## 2023-11-09 RX ADMIN — LEVALBUTEROL HYDROCHLORIDE 1.25 MG: 1.25 SOLUTION RESPIRATORY (INHALATION) at 07:07

## 2023-11-09 RX ADMIN — MELATONIN 3 MG: at 21:09

## 2023-11-09 RX ADMIN — FAMOTIDINE 20 MG: 20 TABLET, FILM COATED ORAL at 18:01

## 2023-11-09 RX ADMIN — OXYCODONE HYDROCHLORIDE 5 MG: 5 SOLUTION ORAL at 13:04

## 2023-11-09 RX ADMIN — MAGNESIUM SULFATE HEPTAHYDRATE 2 G: 40 INJECTION, SOLUTION INTRAVENOUS at 01:41

## 2023-11-09 RX ADMIN — FAMOTIDINE 20 MG: 20 TABLET, FILM COATED ORAL at 08:58

## 2023-11-09 RX ADMIN — FUROSEMIDE 40 MG: 10 INJECTION, SOLUTION INTRAMUSCULAR; INTRAVENOUS at 08:58

## 2023-11-09 RX ADMIN — CHLORHEXIDINE GLUCONATE 15 ML: 1.2 SOLUTION ORAL at 08:57

## 2023-11-09 RX ADMIN — IPRATROPIUM BROMIDE 0.5 MG: 0.5 SOLUTION RESPIRATORY (INHALATION) at 07:07

## 2023-11-09 RX ADMIN — GABAPENTIN 300 MG: 300 CAPSULE ORAL at 16:06

## 2023-11-09 RX ADMIN — QUETIAPINE FUMARATE 50 MG: 25 TABLET ORAL at 21:09

## 2023-11-09 RX ADMIN — SODIUM CHLORIDE SOLN NEBU 3% 4 ML: 3 NEBU SOLN at 21:06

## 2023-11-09 RX ADMIN — SERTRALINE 75 MG: 25 TABLET, FILM COATED ORAL at 08:58

## 2023-11-09 RX ADMIN — FILGRASTIM-AAFI 300 MCG: 300 INJECTION, SOLUTION SUBCUTANEOUS at 16:06

## 2023-11-09 RX ADMIN — BUSPIRONE HYDROCHLORIDE 30 MG: 10 TABLET ORAL at 16:06

## 2023-11-09 NOTE — PHYSICAL THERAPY NOTE
Physical Therapy Treatment Note    Patient's Name: Rocky Downing  : 1955       11/09/23 2751   Note Type   Note Type Treatment   Pain Assessment   Pain Assessment Tool Olvera-Baker FACES   Pain Score 6   Pain Location/Orientation Location: Neck   Restrictions/Precautions   Weight Bearing Precautions Per Order No   Other Precautions Chair Alarm; Bed Alarm; Fall Risk;O2  (trach to HF)   General   Chart Reviewed Yes   Response to Previous Treatment Patient reporting fatigue but able to participate. Family/Caregiver Present No   Cognition   Arousal/Participation Cooperative   Orientation Level Oriented X4   Following Commands Follows one step commands with increased time or repetition   Comments pt ID by wristband, name and    Subjective   Subjective pt up in recliner chair agreeable to PT treatment, noting fatigue and pain   Bed Mobility   Additional Comments pt OOB in recliner chair at start/end of Pt treatment   Transfers   Sit to Stand 5  Supervision   Additional items Increased time required;Verbal cues; Assist x 1   Stand to Sit 5  Supervision   Additional items Assist x 1; Increased time required;Verbal cues   Additional Comments x3 STS performed throughout session, on inital STS pt requires use of momentum to reach standing position, steady, no LOB, safe hand placement   Ambulation/Elevation   Gait pattern Improper Weight shift;Decreased foot clearance; Excessively slow   Gait Assistance 4  Minimal assist   Additional items Assist x 1;Verbal cues   Assistive Device Rolling walker   Distance 10'x2, 10'x2   Ambulation/Elevation Additional Comments pt HR to 110s post ambulation, SP O2 remains at % post ambulation, pt notes signficant fatigue post ambulation, requires 3-4 minutes between ambulatory trials   Balance   Static Sitting Good   Dynamic Sitting Fair +   Static Standing Fair -   Dynamic Standing Fair -   Ambulatory Poor +   Endurance Deficit   Endurance Deficit Description limited ambulation, activity tolerance   Activity Tolerance   Activity Tolerance Patient limited by fatigue   Medical Staff Made Aware Resident present   Nurse Made Aware TINO [email or web address] pre/post   Assessment   Prognosis Fair   Problem List Decreased strength; Impaired balance;Decreased endurance;Decreased cognition; Impaired judgement;Decreased safety awareness; Obesity; Decreased skin integrity;Pain   Assessment Pt seen for pt treatment today. Pt does not demonstrate significant change in functional status. Pt limited by this session from fatigue and pain. Pt noting her legs feel weak. Pt will be provided handout of HEP to perform when not working with PT services. Pt requires signficant rest between ambulatory trials today due to her fatigue. Pt would contine to benefit from skilled PT to address deficits and return to improved level of function. Goals   Patient Goals to get stronger   STG Expiration Date 11/16/23   Short Term Goal #1 In 10-14 days pt will be able to: 1. Demonstrate ability to perform all aspects of bed mobility independently to improve functional safety. 2. Perform functional transfers independently to facilitate safe return to previous living environment. 3. Ambulate 50 ft with LRAD and supervision with stable vitals to improve safety with household distances and reduce fall risk. 4. Improve LE strength grades by 1 to increase ease of functional mobility with transfers and gait. 5. Pt will demonstrate improved balance by one grade in order to decrease risk of falls. 6. Tolerate 3 hours OOB to promote improved activity tolerance. 7. Improve 30s STS to at least 10 repetitions to decrease risk of falls. PT Treatment Day 6   Plan   Treatment/Interventions Functional transfer training;LE strengthening/ROM; Elevations; Therapeutic exercise;Cognitive reorientation;Equipment eval/education;Patient/family training;Bed mobility;Spoke to nursing;Spoke to case management;OT Progress Progressing toward goals   PT Frequency 2-3x/wk  (pending progression)   Discharge Recommendation   Rehab Resource Intensity Level, PT II (Moderate Resource Intensity)   AM-PAC Basic Mobility Inpatient   Turning in Flat Bed Without Bedrails 3   Lying on Back to Sitting on Edge of Flat Bed Without Bedrails 2   Moving Bed to Chair 3   Standing Up From Chair Using Arms 3   Walk in Room 2   Climb 3-5 Stairs With Railing 1   Basic Mobility Inpatient Raw Score 14   Basic Mobility Standardized Score 35.55   Highest Level Of Mobility   -HLM Goal 4: Move to chair/commode   JH-HLM Achieved 6: Walk 10 steps or more   Education   Education Provided Mobility training   Patient Explanation/teachback used   End of Consult   Patient Position at End of Consult Bed/Chair alarm activated; All needs within reach; Bedside chair     The patient's AM-PAC Basic Mobility Inpatient Short Form Raw Score is 15. A Raw score of less than or equal to 16 suggests the patient may benefit from discharge to post-acute rehabilitation services. Please also refer to the recommendation of the Physical Therapist for safe discharge planning.     Fernando Cannon, PT

## 2023-11-09 NOTE — PROGRESS NOTES
[address] [facility]  Progress Note  Name: Carmel Holly  MRN: [id number]  Unit/Bed#: ICU 03 I Date of Admission: 9/13/2023   Date of Service: 11/9/2023 I Hospital Day: 62    Assessment/Plan   * Acute respiratory failure with hypoxia secondary to oropharyngeal mass  Assessment & Plan  Cuffless trach placed 9/17, transitioned to cuffed on 10/3. Oxygen requirements not improving. For mental health patient is on buspirone, quetiapine, and sertraline. For pain, gabapentin, oxycodone scheduled and prn, prn oxycodone mainly utilized for increased pain during chemo and after a bronch. On Guaifenesin, Atrovent and Xopenex for airway maintenance. No improvement in bilateral consolidation or atelectasis and groundglass opacities on repeat CT and chest x-rays. Bronchial cx with 3 colonies CoNS. PCP PCR invalid, repeat negative. CTCAP 10/12:  indicates additional groundglass opacity with paraseptal emphysema, which may be contributing to inability to remain off vent. Bronchoscopy performed and unremarkable. Samples sent to lab for culture. Patient was placed back on vent s/p procedure. Now on high flow. Completed 7 day course of cefepime and Vancomycin. Bronchial culture and gram stain negative. CMV, AFB, Fungitell and Pneumocystis jiroveci (carinii) negative. Cuffless trach was placed 9/17. Replaced with a cuffed Shiley XLT #7 10/3    Plan:  ID consult  Pending urine histoplasma antigen, Aspergillus galactomannan antigen and antibody. Considering Chemo induced pneumonitis. Continue on Pulmicort,   Formoterol nebulization every 12 hours. Levalbuterol and ipratropium every 6 hours. Continue trach collar during the day and at night. Continue her on dysphagia 2 diet. Wean down HFNC to a goal FiO2 of 50% to be discharged  Palliative on board to discuss goals of care. Patient motivated to work with PT.     Hyperkalemia  Assessment & Plan  Recent Labs     11/07/23  0433 11/08/23  6594 11/09/23  0515   K 4.9 4.2 3.8   MG  --  1.7* 3.1*         Workup:  Etiology: Patient takes bactrim. Tumor lysis would be less likely since calcium is elevated rather than decreased. Uric acid is normal.   K+ elevated but returned to normal after medical management. Will give another round of medical management if elevated again. Plan:  Trend potassium on daily BMP. Magnesium sulphate 2 mg IV replaced. Large cell lymphoma ([address])  Assessment & Plan  Large B-cell lymphoma, stage II. First chemo cycle 9/22 4 days of R-EPOCH. 9/27 Rituxan. 9/28 Filgrastim. Received nivestym 300 mc 7 days dcd on 10/26      Prophylaxis:  Bactrim prophylaxis Monday Wednesday Friday,  Acyclovir 400 mg twice daily  Fluconazole 200 mg daily  Levofloxacin 250 mg daily  Lovenox 40 mg daily    9/22  4 days of R-EPOCH.  9/27 Rituxan  10/13 for [facility] cycle 2, which was done over 4 days and cytoxan on 10/19.  11/6 completed R-EPOCH cycle 3. Plan:  She will receive Cytoxan on 11/7. Oncology noted that she will require a 2 L fluid bolus before cytoxan. Ok to give bolus. Oncology is following. Management as per onc team.  Transfuse blood products as needed. Keep Hgb>7 and Plt>20 for chemo   See acute respiratory failure above    Oropharyngeal mass  Assessment & Plan  9/14 Neck/ soft tissue CT: Large enhancing soft tissue mass identified within the left neck involving multiple spaces. Centered within the left oropharynx with extensions as described above into multiple spaces. This results in significant airway compromise. 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 Small level 3/level 4 nodes are seen within the neck. Tracheostomy by ENT. Replaced on 9/26. H/o tobacco abuse, daily smoker. On nicotine while inpatient, confirmed with patient she needs nicotine patch. CT soft tissue neck shows reduced mass effect from 9/14 to 10/13.  Can consider reducing trach size if continues to improve    Plan:  ENT and heme onc following  As per speech therapist diet  Dysphagia 2. Continue parallel PEG tube feeds for nutrition. See acute respiratory failure and large B cell lymphoma above. RML pneumonia-resolved as of 9/22/2023  Assessment & Plan  9/13 CT PE study: Patchy consolidation right middle lobe, new from 8/25/2023, compatible with pneumonia. No leukocytosis, no fever/chills. Positive for sore throat, cough. New onset pneumonia after recent hospitalization and antibiotics treatment. 9/14: Bronchoscopy/ ABL. MRSA negative. ABL revealed 2+ Growth of Candida albicans, suspected contaminant. Pancytopenia due to chemotherapy (HCC)-resolved as of 11/3/2023  Assessment & Plan  Patient received 1 PRBC transfusion on 10/5. Hemoglobin 8.2 s/p 1 PRBC transfusion on 10/25. B/L is 12-14. No sites of bleeding, no black stools. Iron panel indicative of inflammatory anemia. Normal Vitamin B12 levels, negative hemolysis smear. Folate is low 5.5. Plan:  Trend hemoglobin and transfuse for <7. As per heme/onc transfuse irradiated and leukoreduced blood products. Trend platelets  Folic acid 1mg daily supplementation  As per my conversation with Heme/oncology attending, patient is expected to have chemotherapy associated pancytopenia. No further anemia w/u required. PO (acute kidney injury) (HCC)-resolved as of 9/21/2023  Assessment & Plan  Lab Results   Component Value Date    CREATININE 0.97 11/09/2023    CREATININE 0.79 11/08/2023    CREATININE 1.09 11/07/2023       Likely prerenal.  Second to poor oral intake versus poor urine output. Baseline creatinine 1 to 1.2. Cr at baseline  Plan:  Urinary retention protocol, Daily weights, I/O  Trend Cr daily    Blister (nonthermal) of left forearm, sequela  Assessment & Plan  Blisters of left upper extremity healing, no signs of infection, continue daily wound care.      Generalized abdominal pain  Assessment & Plan  Patient reports abdominal pain which is unchanged since 9/22 no guarding on exam. Takes Famotidine for GERD at home. Alk phos 10/2 145, 10/20 79. CTCAP reveals complex right upper pole renal lesion requiring possible outpatient MRI    Continue Famotidine  On bowel regimen with Senna and Miralax prn    (HFpEF) heart failure with preserved ejection fraction [facility])  Assessment & Plan  Wt Readings from Last 3 Encounters:   11/08/23 79.5 kg (175 lb 4.3 oz)   09/07/23 74.6 kg (164 lb 6.4 oz)   08/30/23 73.3 kg (161 lb 9.6 oz)     Lab Results   Component Value Date    LVEF 60 08/28/2023     (H) 10/28/2023     (H) 09/29/2023     Echo 8/28/23: LVEF 60%. Plan:  K > 4   Measure I/O      Ambulatory dysfunction  Assessment & Plan  2/2 Impacted by chronic pain syndrome + prolonged hospital stay. Continue OOB with PT. PT rec post acute rehab however reportedly Cannot get LTACH placement while getting chemo per CM  She is aware STR SNFs continue to follow and treatment can be done from a SNF level of care. PT would need to be on trach collar for at least 72 hours with no need for the vent. Aware that pt would need to be around 28% FiO2     Depression  Assessment & Plan  Patient on Zoloft 75 daily and Seroquel hs  Patient is depressed, multiple family/palliative discussions. patient is firm that she wants to live and to fight, she is just tired. Currently goal is disease treatment oriented. Full code. No SI/HI ideations. Palliative signed off, will reconsult if patient changes her mind regarding wishes. Chronic Superficial thrombophlebitis  Assessment & Plan  Right forearm soreness. RUE US: No acute or chronic deep vein thrombosis. 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 PO not severe enough to require renal dosing at this time, will continue to monitor and adjust dosing as needed.       Continue DVT ppx with Lovenox 40    CKD (chronic kidney disease) stage 3, GFR 30-59 ml/min [facility])  Assessment & Plan  Lab Results   Component Value Date    EGFR 59 11/09/2023 EGFR 76 11/08/2023    EGFR 51 11/07/2023    CREATININE 0.97 11/09/2023    CREATININE 0.79 11/08/2023    CREATININE 1.09 11/07/2023     Baseline Cr between 0.75-1.1  Initial HERIBERTO felt 2/2 prerenal azotemia 2/2 diuresis, reduced PO intake +/- ATN. Patient received 2 doses of Bumex IV 2 g as she had not been responding appropriately to IV 40 Lasix daily. Creatinine went up by 0.5 meeting criteria for an HERIBERTO. This may be prerenal intrinsic or postrenal.  Potential prerenal causes include reduced kidney perfusion due to lisinopril. Intrinsic can also be lisinopril due to ATN, AIN from chemo medications, TLS, crystaline nephropathy from acyclovir, rituxan nephrotoxicity, filgrastim glomerulonephritis. Post renal obstructive could be from urine retention from vincristine or cyclophosphamide bladder fibrosis. Suspect most likely due to medications as a combination of prerenal and intrinsic. FENa was 0.2%, UA shows no casts or signs of infection, urine eosinophils 0%. Patient likely has prerenal HERIBERTO from volume depletion. Received 2 L NS and 1 pRBC and cr went up by 0.07 still and Na and Cl went down, suspect that volume prevented further cr rise and free water excretion impaired by kidney function, allowing hyponatremia to increase. Creatinine increase is likely plateaued and went down the following day. Suspect that now that nephrotoxic medications have been stopped she responded well to Lasix and creatinine downtrended. HERIBERTO now resolved. Will be cautious about nephrotoxins and monitor as restarting EPOCH and ppx. Patient gets volume depleted and overloaded very easily. Especially from chemo. 10/19 Heriberto as cr went up by 0.3 in 48 hours from 0.82 on 10/17 to 1.22 on 10/19. Suspect this is prerenal hypovolemic, will see if patient improves with fluids. She gets overloaded easily so if no improvement suspect CRS again. 10/29 HERIBERTO cr went up by 0.3 again. She has 922 ml UOP in last 24 hours.  HERIBERTO likely from lasix 40mg IV given yesterday. She is not hypovolemic. Plan:   Continue to monitor UO/renal function. Avoid nephrotoxic agents  Continue to monitor a.m. BMP. Pain syndrome, chronic  Assessment & Plan  Home regimen: Oxycodone 5 mg BID, Tylenol 650 mg q8 prn. Gabapentin 600 mg TID. Medical marijuana. Lumbar radiculopathy and impaired ambulatory dysfunction secondary to pain. Has bowel regimen and is having bowel movements daily. Patient required additional pain management during previous cycle and has some additional pain from tunneled line placement    Plan:  Continue gabapentin 300 mg TID, oxycodone 5 mg TID, prn Tylenol 650 mg q6. Oxycodone 5mg every 8 hours  Oxycodone 2.5 mg q6 PRN    Benign essential hypertension  Assessment & Plan  Home regimen Coreg 12.5 mg BID, Amlodipine 10 mg daily, and lisinopril 40 mg. All discontinued at this time secondary to hypotension and PO since cycle 1. but stable currently without any antihypertensives. Systolic persistently elevated and tachycardic, potentially secondary to pain. Patient was more hypotensive during last chemo. Coreg contraindicated during chemo, since cycles are every other week, would be better to stick with lopressor during duration of therapy as opposed to switching constantly. Plan:  Monitor vitals. Continue Norvasc 10 and lopressor 25 bid  Prn hydralazine for SBP > 160    Angioedema-resolved as of 11/4/2023  Assessment & Plan  POA in [place] ([facility] ED: Swollen tongue, soft and hard palate with severe angioedema. Decadron 10 mg, Benadryl 25 mg given. 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 Plan:  Cuffless trach 9/17, cuffed Shiley XLT #7 10/3  See acute respiratory failure and oropharyngeal mass above  Continue to wean vent.      Hyperlipidemia-resolved as of 10/26/2023  Assessment & Plan  Lab Results   Component Value Date    CHOLESTEROL 118 10/09/2023    HDL 14 (L) 10/09/2023    TRIG 144 10/09/2023    [facility] 104 10/09/2023 Continue outpatient diet and lifestyle modification. COPD without exacerbation (HCC)-resolved as of 9/26/2023  Assessment & Plan  Continue vent with nebs. Wean off vent. Encephalopathy-resolved as of 9/21/2023  Assessment & Plan  9/19- Pt appeared to be AAO x3. Improving. ICU Core Measures     Vented Patient  VAP Bundle  VAP bundle ordered     A: Assess, Prevent, and Manage Pain Has pain been assessed? Yes  Need for changes to pain regimen? No   B: Both Spontaneous Awakening Trials (SATs) and Spontaneous Breathing Trials (SBTs) Plan to perform spontaneous awakening trial today? N/A      C: Choice of Sedation RASS Goal: 0 Alert and Calm  Need for changes to sedation or analgesia regimen? No   D: Delirium CAM-ICU: Negative   E: Early Mobility  Plan for early mobility? Yes   F: Family Engagement Plan for family engagement today? Yes       Antibiotic Review: Patient on appropriate coverage based on culture data. Review of Invasive Devices:      Central access plan: Medications requiring central line      Prophylaxis:  VTE VTE covered by:  enoxaparin, Subcutaneous, 40 mg at 11/08/23 0933       Stress Ulcer  covered byfamotidine (PEPCID) tablet 20 mg [[id number]]       Subjective   HPI/24hr events: Patient desaturated overnight and maintained at it. Oxygen supplementation had to be raised to high flow. Review of Systems   Constitutional:  Negative for chills and fever. HENT:  Negative for ear pain and sore throat. Eyes:  Negative for pain and visual disturbance. Respiratory:  Negative for cough and shortness of breath. Cardiovascular:  Negative for chest pain and palpitations. Gastrointestinal:  Negative for abdominal pain and vomiting. Genitourinary:  Negative for dysuria and hematuria. Musculoskeletal:  Negative for arthralgias and back pain. Skin:  Negative for color change and rash. Neurological:  Negative for seizures and syncope.    All other systems reviewed and are negative. Objective                            Vitals I/O      Most Recent Min/Max in 24hrs   Temp 99.1 °F (37.3 °C) Temp  Min: 97.9 °F (36.6 °C)  Max: 99.6 °F (37.6 °C)   Pulse (!) 118 Pulse  Min: 69  Max: 118   Resp 21 Resp  Min: 16  Max: 21   /75 BP  Min: 123/75  Max: 170/83   O2 Sat 99 % SpO2  Min: 86 %  Max: 100 %      Intake/Output Summary (Last 24 hours) at 11/9/2023 0834  Last data filed at 11/8/2023 1452  Gross per 24 hour   Intake 455 ml   Output 1050 ml   Net -595 ml         Diet Dysphagia/Modified Consistency; Dysphagia 2-Mechanical Soft; Honey Thick Liquid     Invasive Monitoring Physical exam    Physical Exam  Eyes:      Pupils: Pupils are equal, round, and reactive to light. HENT:      Head: Normocephalic and atraumatic. Nose: No nasal deformity or congestion. Neck:      Trachea: Tracheostomy present. Comments: High flow  Constitutional:       Appearance: She is ill-appearing. Pulmonary:      Effort: Pulmonary effort is normal.   Neurological:      General: No focal deficit present. Mental Status: She is alert. Mental status is at baseline. She is calm.            Diagnostic Studies      EKG: NSR  Imaging: No new imaging      Medications:  Scheduled PRN   acyclovir, 400 mg, BID  amLODIPine, 10 mg, Daily  budesonide, 0.5 mg, Q12H  busPIRone, 30 mg, TID  chlorhexidine, 15 mL, Q12H ASHLEY  enoxaparin, 40 mg, Q24H ASHLEY  famotidine, 20 mg, BID  fentanyl citrate (PF), 100 mcg, Once  Filgrastim-aafi, 300 mcg, Once  fluconazole, 332 mg, Daily  folic acid, 1 mg, Daily  formoterol, 20 mcg, Q12H  furosemide, 40 mg, BID (diuretic)  gabapentin, 300 mg, TID  levalbuterol, 1.25 mg, Q6H   And  ipratropium, 0.5 mg, Q6H  melatonin, 3 mg, HS  metoprolol tartrate, 25 mg, Q12H ASHLEY  nicotine, 7 mg, Daily  oxyCODONE, 5 mg, Q8H  QUEtiapine, 50 mg, HS  senna, 8.8 mg, HS  sertraline, 75 mg, Daily  sodium chloride, 4 mL, TID  sulfamethoxazole-trimethoprim, 1 tablet, Once per day on Mon Wed Fri      alteplase, 2 mg, Q1MIN PRN  alteplase, 2 mg, Q1MIN PRN  alteplase, 2 mg, Q1MIN PRN  ondansetron, 4 mg, Q8H PRN  oxyCODONE, 2.5 mg, Q6H PRN       Continuous          Labs:    CBC    Recent Labs     11/08/23 0634 11/09/23  0515   WBC 5.08 9.76   HGB 7.7* 7.6*   HCT 24.4* 23.6*   * 401*     BMP    Recent Labs     11/08/23 0634 11/09/23  0515   SODIUM 144 141   K 4.2 3.8    105   CO2 30 31   AGAP 7 5   BUN 41* 40*   CREATININE 0.79 0.97   CALCIUM 10.3* 10.0       Coags    No recent results     Additional Electrolytes  Recent Labs     11/08/23  0634 11/09/23  0515 11/09/23  0521   MG 1.7* 3.1*  --    PHOS 3.6  --  4.5*          Blood Gas    No recent results  Recent Labs     11/09/23  0515   PHVEN 7.380   AWX6FYH 54.7*   PO2VEN 38.4   BBU3TST 31.6*   BEVEN 5.7    LFTs  Recent Labs     11/08/23  0634   ALT 59*   AST 36   ALKPHOS 68   ALB 3.5   TBILI 0.47       Infectious  No recent results  Glucose  Recent Labs     11/08/23 0634 11/09/23  0515   GLUC 108 95                 Jose Alfredo Sosa MD

## 2023-11-09 NOTE — ASSESSMENT & PLAN NOTE
Recent Labs     11/07/23  0433 11/08/23  0634 11/09/23  0515   K 4.9 4.2 3.8   MG  --  1.7* 3.1*         Workup:  Etiology: Patient takes bactrim. Tumor lysis would be less likely since calcium is elevated rather than decreased. Uric acid is normal.   K+ elevated but returned to normal after medical management. Will give another round of medical management if elevated again. Plan:  Trend potassium on daily BMP. Magnesium sulphate 2 mg IV replaced.

## 2023-11-09 NOTE — ASSESSMENT & PLAN NOTE
POA in [place] ([place] ED: Swollen tongue, soft and hard palate with severe angioedema. Decadron 10 mg, Benadryl 25 mg given. 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 Plan:  Cuffless trach 9/17, cuffed Shiley XLT #7 10/3  See acute respiratory failure and oropharyngeal mass above  Continue to wean vent.

## 2023-11-09 NOTE — ASSESSMENT & PLAN NOTE
Large B-cell lymphoma, stage II. First chemo cycle 9/22 4 days of R-EPOCH. 9/27 Rituxan. 9/28 Filgrastim. Received nivestym 300 mc 7 days dcd on 10/26      Prophylaxis:  Bactrim prophylaxis Monday Wednesday Friday,  Acyclovir 400 mg twice daily  Fluconazole 200 mg daily  Levofloxacin 250 mg daily  Lovenox 40 mg daily    9/22  4 days of R-EPOCH.  9/27 Rituxan  10/13 for [facility] cycle 2, which was done over 4 days and cytoxan on 10/19.  11/6 completed R-EPOCH cycle 3. Plan:  She will receive Cytoxan on 11/7. Oncology noted that she will require a 2 L fluid bolus before cytoxan. Ok to give bolus. Oncology is following. Management as per onc team.  Transfuse blood products as needed.   Keep Hgb>7 and Plt>20 for chemo   See acute respiratory failure above

## 2023-11-09 NOTE — PLAN OF CARE
Problem: PHYSICAL THERAPY ADULT  Goal: Performs mobility at highest level of function for planned discharge setting. See evaluation for individualized goals. Description: Treatment/Interventions: Functional transfer training, LE strengthening/ROM, Elevations, Therapeutic exercise, Endurance training, Patient/family training, Equipment eval/education, Bed mobility, Gait training, Spoke to nursing, Spoke to case management          See flowsheet documentation for full assessment, interventions and recommendations. Outcome: Progressing  Note: Prognosis: Fair  Problem List: Decreased strength, Impaired balance, Decreased endurance, Decreased cognition, Impaired judgement, Decreased safety awareness, Obesity, Decreased skin integrity, Pain  Assessment: Pt seen for pt treatment today. Pt does not demonstrate significant change in functional status. Pt limited by this session from fatigue and pain. Pt noting her legs feel weak. Pt will be provided handout of HEP to perform when not working with PT services. Pt requires signficant rest between ambulatory trials today due to her fatigue. Pt would contine to benefit from skilled PT to address deficits and return to improved level of function. Barriers to Discharge: Inaccessible home environment, Decreased caregiver support (Pt is at home alone during the day; + PAUL her home)     Rehab Resource Intensity Level, PT: II (Moderate Resource Intensity)    See flowsheet documentation for full assessment.

## 2023-11-09 NOTE — UTILIZATION REVIEW
Continued Stay Review    Date: 11/9/23                           Current Patient Class: inpatient   Current Level of Care: level 1 stepdown     HPI:68 y.o. female initially admitted on 9/13/23 inpatient  due to angioedema/respiratory failure with new right middle lobe opacity/Mediastinal lymphadenopathy/COPD/PO/heart failure not in exacerbation/PO. 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  9/14: Bronchoscopy/ ABL. + pneumonia and completed 7 day course of cefepime and vancomycin . Has large B cell lymphoma, stage II, First chemo cycle 9/22 4 days of R-EPOCH. 9/27 Rituxan. 9/28 Filgrastim. Received nivestym 300 mc 7 days dcd on 10/26.  11/6 completed R-EPOCH cycle 3. Cytoxan on 11/7 . Pancytopenic due to chemo and transfused 2 units PRBC. Baseline Cr between 0.75-1.1 and initially with PO. Has chronic pain. Per CM can not get LTACH placement on chemo. If to go to SNF short term rehab,  need to be on trach collar for at least 72 hours with no need for the vent. Patient would need to be around 28% FiO2      Assessment/Plan:   11/9/23  Possible chemo induced pneumonitis. Overnight desaturated and oxygen increased to high flow. On exam appears ill.  trache. High flow oxygen. Calm. Bun 40, creatinine 0.97. Mg 3.1. phos 4. 5. wbc 9.76.  H&H 7.6/23. 6.  bronch culture from 11/6/23 gram stain rate polys. To continue Pulmicort, Formoterol neb every 12 hours. Levalbuterol and ipratropium every 6 hours. Continue trach collar during the day and at night . Wean down HFNC to a goal FiO2 of 50%. Patient wants to work with PT. Daily BMP. For Lymphoma:  on prophylaxis:   Bactrim prophylaxis Monday Wednesday Friday, Acyclovir 400 mg twice daily, Fluconazole 200 mg daily,  Levofloxacin 250 mg daily. Keep Hgb>7 and Plt>20 for chemo. Dysphagia 2 diet and parallel tube feeds. Bowel regimen. Patient wants to be treated. Goal is disease treatment oriented. Pain control       Vital Signs:   11/09/23 0826 -- -- -- -- -- 99 % -- -- -- -- -- -- --   11/09/23 0740 99.1 °F (37.3 °C) 118 Abnormal  21 123/75 93 98 % -- -- -- -- High flow nasal cannula -- Sitting   11/09/23 0707 -- -- -- -- -- 91 % 75 -- 4 L/min -- High flow nasal cannula HFNC prongs --   11/09/23 0308 99.1 °F (37.3 °C) 93 21 136/75 100 95 % -- -- -- -- High flow nasal cannula -- Lying   11/09/23 0305 -- -- -- -- -- 100 % 70 -- 40 L/min -- High flow nasal cannula HFNC prongs --   11/09/23 0218 -- -- -- -- -- 92 % 100 68 -- 12 L/min Trach mask -- --   11/08/23 2311 97.9 °F (36.6 °C) 69 16 170/83 117 95 % -- -- -- -- Trach mask -- Lying   11/08/23 2217 -- -- -- -- -- 93 % 100 68 -- 12 L/min Trach mask -- --   11/08/23 2143 -- 113 Abnormal  -- 170/83 -- -- -- -- -- -- -- -- --   11/08/23 1950 -- -- -- -- -- 97 % 50 68 -- 12 L/min Trach mask -- --   11/08/23 1934 99.3 °F (37.4 °C) 87 19 126/64 87 97 % -- -- -- -- Trach mask         Pertinent Labs/Diagnostic Results:   CT chest wo contrast   Final Result by Ally Johns MD (11/04 6661)      1. Persistent severe bilateral alveolar and interstitial opacities superimposed upon pulmonary emphysema. For the most part, this is unchanged from the most recent prior study although there is somewhat less atelectasis at the lung bases perhaps related    to diminished size of previously seen pleural effusions. When the airspace disease is compared to earlier examinations for example 9/13/2023, significant worsening is again noted   2. No pneumothorax   3. Markedly enlarged main pulmonary artery consistent with pulmonary arterial hypertension. Workstation performed: [id number]         XR chest portable ICU   Final Result by Zeke Cortes MD (11/03 6410)      Diffuse abnormal interstitial and alveolar disease appears worse. Correlate for multifocal pneumonia, heart failure, ARDS. Workstation performed: [id number]         XR chest portable   Final Result by Dleonte Corral MD (10/30 1109)      Persistent dense  left  lung base opacity may represent a combination of effusion and parenchymal opacity. Persistent bilateral diffuse interstitial and hazy airspace/groundglass opacity. Workstation performed: [id number]         XR chest portable ICU   Final Result by Delonte Corral MD (10/30 1120)      No significant change. Workstation performed: [id number]         FL barium swallow video w speech   Final Result by LORIE RILEY (10/22 1411)      IR tunneled central line placement   Final Result by Euell Sandifer, MD (10/19 1254)   Successful placement of right chest tunneled central venous catheter via the right internal jugular vein. The tip terminates at the superior cavoatrial junction. Tunneled CVC is ready for immediate use. Workstation performed: [id number]         XR chest portable ICU   Final Result by Carie Broderick MD (10/19 1658)      Persistent groundglass opacity, likely pulmonary edema, with trace effusions. Workstation performed: [id number]         XR chest portable   Final Result by Ciara Bingham MD (15/58 1307)      Improved groundglass opacities in the bilateral lower lung zones. Workstation performed: [id number]         CT soft tissue neck w contrast   Final Result by MENG Peoples MD (10/13 1534)   Improving multi spatial mass centered in the left nasopharynx/oropharynx. Again the mass extends up to the skull base in the infratemporal fossa with loss of perineural fat at the left foramen ovale. Decreased mass effect on the airway. No adenopathy by size criteria. Bilateral effusions and extensive airspace disease is stable compared with yesterday's chest CT.       Workstation performed: [id number]         CT chest abdomen pelvis w contrast   Final Result by Carmelita Coombs MD (10/13 1104)      Diffuse bilateral groundglass opacities and areas of more dense consolidation as described above which could represent pulmonary edema, pneumonia, or diffuse alveolar damage. Complex right upper pole renal lesion for which further evaluation is recommended with MRI on an outpatient basis. Mildly enlarged mediastinal lymph nodes which may be due to the patient's known lymphoma. The study was marked in [facility] for immediate notification. Workstation performed: [id number]         XR chest portable   Final Result by Savanna Hensley MD (10/12 0645)      Diffuse abnormal airspace and groundglass opacities. Correlate for infection. Workstation performed: [id number]         XR chest portable   Final Result by Genesis Hagen MD (10/09 4796)      Persistent pulmonary edema with small effusions. Workstation performed: [id number]         XR chest portable ICU   Final Result by Hoa Nina MD (10/05 7799)      Stable patchy bilateral airspace consolidation and diffuse groundglass opacities, which may represent a combination of atelectasis and pneumonia. Workstation performed: [id number]         XR chest portable ICU   Final Result by Savanna Hensley MD (10/02 0710)      Airspace disease at both lung bases may represent infiltrates and/or atelectasis. Diffuse groundglass opacities concerning for infection. Workstation performed: [id number]         CT pe study w abdomen pelvis w contrast   Final Result by Michaela Winters MD (09/30 1939)      1. Suboptimal opacification of the pulmonary arteries. No large central pulmonary embolism is seen. 2. Redemonstrated dependent atelectasis in the right greater than left lower lobes and in the left upper lobe/lingula. Increased patchy opacity in the right lower lobe could be due to atelectasis or pneumonia.       3. Diffuse bilateral groundglass opacities may be infectious or inflammatory. 4. Hilar and mediastinal lymphadenopathy similar to prior. 5. No acute findings in the abdomen or pelvis. 6. Endometrium appears thickened in this postmenopausal patient. Prior pelvic ultrasound 10/26/2020 showed abnormal thickened endometrium. Recommend nonemergent gynecologic evaluation if not already performed. 7. Chronic lytic lesion in T12 vertebral body gradually progressing since 2013 with chronic pathologic fracture. The study was marked in [facility] for immediate notification. Workstation performed: [id number]         XR chest portable   Final Result by Melia Pereira MD (10/01 1225)      Cardiomegaly      Vascular congestion      Bibasilar atelectasis and/or infiltrate left greater. Bilateral pleural effusions                  Workstation performed: [id number]         XR chest portable   Final Result by Margarita Martinez MD (09/28 4029)      Interval removal of the endogastric tube. No other significant interval change. Workstation performed: [id number]         IR gastrostomy tube placement   Final Result by Marcus Osborn MD (09/29 1610)   Impression: Successful percutaneous fluoroscopic and ultrasound-guided placement of a 12 [place] gastrostomy tube as described above. Workstation performed: [id number]         XR chest portable ICU   Final Result by Melia Pereira MD (09/25 1515)      Mild cardiomegaly      Slightly increased mild vascular congestion      Bilateral pleural effusions      Increased left base atelectasis and/or infiltrate.                   Workstation performed: [id number]         VAS upper limb venous duplex scan, unilateral/limited   Final Result by Mark Hensley MD (09/23 1841)      XR chest portable ICU   Final Result by Carine Javier MD (09/22 1007)      Persistent pulmonary venous congestion with small effusions and bibasilar atelectasis. Workstation performed: [id number]         US right upper quadrant   Final Result by Candy Castañeda MD (09/22 0630)      No cholelithiasis. Gallbladder sludge is present with wall thickness upper limits of normal. Negative sonographic Cancino sign. Findings may be sequela of chronic gallbladder dysfunction. Consider follow-up with a HIDA scan if it would alter patient    management. Workstation performed: [id number]         CT abdomen pelvis w contrast   Final Result by Leonid Rodríguez MD (09/21 3131)      1. No abdominal lymphadenopathy. 2.  Question gallbladder wall thickening versus motion artifact. If there is clinical concern for gallbladder pathology, ultrasound is recommended. 3.  Chronic T12 lytic lesion with pathologic fracture. Workstation performed: [id number]         MRI brain w wo contrast   Final Result by Dudley Myrick MD (09/21 6270)      No evidence of brain metastases. 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 Asymmetric patchy enhancement and restricted diffusion in the left mastoid. The differential includes neoplastic involvement and infection. Workstation performed: [id number]         MRI soft tissue neck wo and w contrast   Final Result by Dudley Myrick MD (09/21 1005)      Known, large left neck mass described in detail above. Workstation performed: [id number]         XR chest portable   Final Result by Kaylah Zacarias DO (09/20 1123)      1. Nasogastric tube is seen with its distal portions within the stomach. The tip of the tube courses beyond the inferior margin of the study. 2. Pulmonary vascular congestion with obscuration of the left hemidiaphragm. This is stable from prior radiograph and may represent small left effusion versus consolidation. 3. Right basilar atelectasis versus trace right effusion. Workstation performed: [id number]         XR chest portable   Final Result by Edinson Franks MD (09/18 1255)   Increased lung markings seen suggest congestion. The left base is obscured   No worsening            Workstation performed: [id number]         CT soft tissue neck w contrast   Final Result by Woo Delong DO (09/14 1644)      Large enhancing soft tissue mass identified within the left neck involving multiple spaces. This appears to be centered within the left oropharynx with extensions as described above into multiple spaces. This results in significant airway compromise. 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 Small level 3 and level 4 nodes are seen within the neck.          I personally discussed this study with ICU resident on 9/14/2023 4:44 PM.            Workstation performed: [id number]         IR gastrostomy (G) tube check/change/reinsertion/upsize    (Results Pending)      Results from last 7 days   Lab Units 11/03/23  2145   SARS-COV-2  Not Detected     Results from last 7 days   Lab Units 11/09/23  0515 11/08/23  0634 11/07/23  0433 11/06/23  1322 11/06/23  0545 11/05/23  0632 11/04/23  0524   WBC Thousand/uL 9.76 5.08 7.14  --  12.27* 21.97* 22.85*   HEMOGLOBIN g/dL 7.6* 7.7* 7.8*  --  7.1* 7.2* 8.0*   I STAT HEMOGLOBIN g/dl  --   --   --  7.5*  --   --   --    HEMATOCRIT % 23.6* 24.4* 25.5*  --  22.8* 23.6* 25.3*   HEMATOCRIT, ISTAT %  --   --   --  22*  --   --   --    PLATELETS Thousands/uL 401* 419* 441*  --  405* 368 357   NEUTROS ABS Thousands/µL  --  4.72  --   --   --   --   --    BANDS PCT %  --   --   --   --  5 13* 6     Results from last 7 days   Lab Units 11/09/23  0521 11/09/23  0515 11/08/23  0634 11/07/23  0433 11/06/23  1322 11/06/23  0545 11/05/23  0434 11/04/23  1742 11/04/23  1017 11/04/23  0524   SODIUM mmol/L  --  141 144 146  --  143 139  --   --  137   POTASSIUM mmol/L  --  3.8 4.2 4.9  --  5.1 5.5*   < > 5.9* 6.6*   CHLORIDE mmol/L --  105 107 106  --  107 105  --   --  105   CO2 mmol/L  --  31 30 29  --  29 29  --   --  29   CO2, I-STAT mmol/L  --   --   --   --  31  --   --   --   --   --    ANION GAP mmol/L  --  5 7 11  --  7 5  --   --  3   BUN mg/dL  --  40* 41* 62*  --  60* 61*  --   --  57*   CREATININE mg/dL  --  0.97 0.79 1.09  --  0.94 1.12  --   --  1.40*   EGFR ml/min/1.73sq m  --  59 76 51  --  61 49  --   --  38   CALCIUM mg/dL  --  10.0 10.3* 10.1  --  9.1 9.5  --   --  9.9   CALCIUM, IONIZED mmol/L  --   --   --   --   --   --   --   --  1.34*  --    CALCIUM, IONIZED, ISTAT mmol/L  --   --   --   --  1.33*  --   --   --   --   --    MAGNESIUM mg/dL  --  3.1* 1.7*  --   --  1.8* 1.9  --   --  2.1   PHOSPHORUS mg/dL 4.5*  --  3.6 5.2*  --  3.2 3.7  --   --  1.7*    < > = values in this interval not displayed.      Results from last 7 days   Lab Units 11/08/23  0634 11/07/23  0433 11/06/23  0545 11/05/23  0434 11/04/23  0524   AST U/L 36 19 25 19 15   ALT U/L 59* 34 34 24 19   ALK PHOS U/L 68 72 74 80 89   TOTAL PROTEIN g/dL 6.2* 6.6 6.1* 6.4 6.3*   ALBUMIN g/dL 3.5 3.6 3.2* 3.3* 3.1*   TOTAL BILIRUBIN mg/dL 0.47 0.37 0.19* 0.21 0.19*     Results from last 7 days   Lab Units 11/07/23  0203 11/04/23  0913 11/04/23  0725 11/03/23  1327   POC GLUCOSE mg/dl 134 143* 173* 184*     Results from last 7 days   Lab Units 11/09/23  0515 11/08/23  0634 11/07/23  0433 11/06/23  0545 11/05/23  0434 11/04/23  0524 11/03/23  1851 11/03/23  0446   GLUCOSE RANDOM mg/dL 95 108 136 173* 144* 187* 159* 96     Results from last 7 days   Lab Units 11/09/23  0515   PH PERICO  7.380   PCO2 PERICO mm Hg 54.7*   PO2 PERICO mm Hg 38.4   HCO3 PERICO mmol/L 31.6*   BASE EXC PERICO mmol/L 5.7   O2 CONTENT PERICO ml/dL 8.1   O2 HGB, VENOUS % 69.3     Results from last 7 days   Lab Units 11/06/23  1322   PH, PERICO I-STAT  7.303   PCO2, PERICO ISTAT mm HG 59.0*   PO2, PERICO ISTAT mm HG 56.0*   HCO3, PERICO ISTAT mmol/L 29.2   I STAT BASE EXC mmol/L 2   I STAT O2 SAT % 85     Results from last 7 days   Lab Units 11/03/23  2145   RESPIRATORY SYNCYTIAL VIRUS  Not Detected     Results from last 7 days   Lab Units 11/03/23  2145   ADENOVIRUS  Not Detected   BORDETELLA PARAPERTUSSIS  Not Detected   BORDETELLA PERTUSSIS  Not Detected   CHLAMYDIA PNEUMONIAE  Not Detected   CORONAVIRUS 229E  Not Detected   CORONAVIRUS HKU1  Not Detected   CORONAVIRUS NL63  Not Detected   CORONAVIRUS OC43  Not Detected   METAPNEUMOVIRUS  Not Detected   RHINOVIRUS  Not Detected   MYCOPLASMA PNEUMONIAE  Not Detected   PARAINFLUENZA 1  Not Detected   PARAINFLUENZA 2  Not Detected   PARAINFLUENZA 3  Not Detected   PARAINFLUENZA 4  Not Detected     Results from last 7 days   Lab Units 11/06/23  1421 11/06/23  1411   GRAM STAIN RESULT  Rare Polys  No organisms seen No Polys or Bacteria seen     Medications:   Scheduled Medications:  acyclovir, 400 mg, Oral, BID  amLODIPine, 10 mg, Oral, Daily  budesonide, 0.5 mg, Nebulization, Q12H  busPIRone, 30 mg, Oral, TID  chlorhexidine, 15 mL, Mouth/Throat, Q12H ASHLEY  enoxaparin, 40 mg, Subcutaneous, Q24H ASHLEY  famotidine, 20 mg, Oral, BID  Filgrastim-aafi, 300 mcg, Subcutaneous, Once 11/9/23   fluconazole, 400 mg, Oral, Daily  folic acid, 1 mg, Oral, Daily  formoterol, 20 mcg, Nebulization, Q12H  furosemide, 40 mg, Intravenous, BID (diuretic)  gabapentin, 300 mg, Oral, TID  levalbuterol, 1.25 mg, Nebulization, Q6H   And  ipratropium, 0.5 mg, Nebulization, Q6H  melatonin, 3 mg, Oral, HS  metoprolol tartrate, 25 mg, Oral, Q12H [address]  nicotine, 7 mg, Transdermal, Daily  oxyCODONE, 5 mg, Oral, Q8H  QUEtiapine, 50 mg, Oral, HS  senna, 8.8 mg, Per NG Tube, HS  sertraline, 75 mg, Per PEG Tube, Daily  sodium chloride, 4 mL, Nebulization, TID  sulfamethoxazole-trimethoprim, 1 tablet, Oral, Once per day on Mon Wed Fri    magnesium Oxide (MAG-OX) tablet 800 mg  Dose: 800 mg  Freq: Once Route: PO  Start: 11/09/23 0130 End: 11/09/23 0136   magnesium sulfate 2 g/50 mL IVPB (premix) 2 g  Dose: 2 g  Freq:  Once Route: IV  Last Dose: 2 g (11/09/23 0141)  Start: 11/09/23 0130 End: 11/09/23 0341       Continuous IV Infusions:     PRN Meds: not used. alteplase, 2 mg, Intracatheter, Q1MIN PRN  alteplase, 2 mg, Intracatheter, Q1MIN PRN  alteplase, 2 mg, Intracatheter, Q1MIN PRN  ondansetron, 4 mg, Intravenous, Q8H PRN  oxyCODONE, 2.5 mg, Oral, Q6H PRN    Skin care plans:   1-Hydraguard to bilateral sacrum, buttock and heels BID and PRN   2-Elevate heels to offload pressure. 3-Ehob cushion in chair when out of bed. 4-Moisturize skin daily with skin nourishing cream.   5-Turn/reposition q2h or when medically stable for pressure re-distribution on skin. 6-Cleanse neck wound with normal saline, apply maxorb to wound, cover with 4X4 drain sponge, change daily and PRN soilage/displacement. Discharge Plan: to be determined. Network Utilization Review Department  ATTENTION: Please call with any questions or concerns to [phone] and carefully listen to the prompts so that you are directed to the right person. All voicemails are confidential.   For Discharge needs, contact Care Management DC Support Team at [phone] opt. 2  Send all requests for admission clinical reviews, approved or denied determinations and any other requests to dedicated fax number below belonging to the campus where the patient is receiving treatment.  List of dedicated fax numbers for the Facilities:  [place] DENIALS (Administrative/Medical Necessity) [phone]   DISCHARGE SUPPORT TEAM (NETWORK) [phone] [address] [facility] [phone]   [address] [place] [address] 1000 [facility] [place] [address] [phone]

## 2023-11-09 NOTE — ASSESSMENT & PLAN NOTE
Lab Results   Component Value Date    CREATININE 0.97 11/09/2023    CREATININE 0.79 11/08/2023    CREATININE 1.09 11/07/2023       Likely prerenal.  Second to poor oral intake versus poor urine output. Baseline creatinine 1 to 1.2.     Cr at baseline  Plan:  Urinary retention protocol, Daily weights, I/O  Trend Cr daily

## 2023-11-09 NOTE — PLAN OF CARE
Problem: MOBILITY - ADULT  Goal: Maintain or return to baseline ADL function  Description: INTERVENTIONS:  -  Assess patient's ability to carry out ADLs; assess patient's baseline for ADL function and identify physical deficits which impact ability to perform ADLs (bathing, care of mouth/teeth, toileting, grooming, dressing, etc.)  - Assess/evaluate cause of self-care deficits   - Assess range of motion  - Assess patient's mobility; develop plan if impaired  - Assess patient's need for assistive devices and provide as appropriate  - Encourage maximum independence but intervene and supervise when necessary  - Involve family in performance of ADLs  - Assess for home care needs following discharge   - Consider OT consult to assist with ADL evaluation and planning for discharge  - Provide patient education as appropriate  Outcome: Progressing  Goal: Maintains/Returns to pre admission functional level  Description: INTERVENTIONS:  - Perform BMAT or MOVE assessment daily.   - Set and communicate daily mobility goal to care team and patient/family/caregiver. - Collaborate with rehabilitation services on mobility goals if consulted  - Perform Range of Motion 4 times a day.   - Dangle patient 3 times a day  - Stand patient 3 times a day  - Ambulate patient 3 times a day  - Out of bed to chair 3 times a day   - Out of bed for meals 3 times a day  - Out of bed for toileting  - Record patient progress and toleration of activity level   Outcome: Progressing     Problem: Prexisting or High Potential for Compromised Skin Integrity  Goal: Skin integrity is maintained or improved  Description: INTERVENTIONS:  - Identify patients at risk for skin breakdown  - Assess and monitor skin integrity  - Assess and monitor nutrition and hydration status  - Monitor labs   - Assess for incontinence   - Turn and reposition patient  - Assist with mobility/ambulation  - Relieve pressure over bony prominences  - Avoid friction and shearing  - Provide appropriate hygiene as needed including keeping skin clean and dry  - Evaluate need for skin moisturizer/barrier cream  - Collaborate with interdisciplinary team   - Patient/family teaching  - Consider wound care consult   Outcome: Progressing     Problem: Nutrition/Hydration-ADULT  Goal: Nutrient/Hydration intake appropriate for improving, restoring or maintaining nutritional needs  Description: Monitor and assess patient's nutrition/hydration status for malnutrition. Collaborate with interdisciplinary team and initiate plan and interventions as ordered. Monitor patient's weight and dietary intake as ordered or per policy. Utilize nutrition screening tool and intervene as necessary. Determine patient's food preferences and provide high-protein, high-caloric foods as appropriate.      INTERVENTIONS:  - Monitor oral intake, urinary output, labs, and treatment plans  - Assess nutrition and hydration status and recommend course of action  - Evaluate amount of meals eaten  - Assist patient with eating if necessary   - Allow adequate time for meals  - Recommend/ encourage appropriate diets, oral nutritional supplements, and vitamin/mineral supplements  - Order, calculate, and assess calorie counts as needed  - Recommend, monitor, and adjust tube feedings and TPN/PPN based on assessed needs  - Assess need for intravenous fluids  - Provide specific nutrition/hydration education as appropriate  - Include patient/family/caregiver in decisions related to nutrition  Outcome: Progressing     Problem: PAIN - ADULT  Goal: Verbalizes/displays adequate comfort level or baseline comfort level  Description: Interventions:  - Encourage patient to monitor pain and request assistance  - Assess pain using appropriate pain scale  - Administer analgesics based on type and severity of pain and evaluate response  - Implement non-pharmacological measures as appropriate and evaluate response  - Consider cultural and social influences on pain and pain management  - Notify physician/advanced practitioner if interventions unsuccessful or patient reports new pain  Outcome: Progressing     Problem: INFECTION - ADULT  Goal: Absence or prevention of progression during hospitalization  Description: INTERVENTIONS:  - Assess and monitor for signs and symptoms of infection  - Monitor lab/diagnostic results  - Monitor all insertion sites, i.e. indwelling lines, tubes, and drains  - Monitor endotracheal if appropriate and nasal secretions for changes in amount and color  - Pierz appropriate cooling/warming therapies per order  - Administer medications as ordered  - Instruct and encourage patient and family to use good hand hygiene technique  - Identify and instruct in appropriate isolation precautions for identified infection/condition  Outcome: Progressing  Goal: Absence of fever/infection during neutropenic period  Description: INTERVENTIONS:  - Monitor WBC    Outcome: Progressing     Problem: SAFETY ADULT  Goal: Maintain or return to baseline ADL function  Description: INTERVENTIONS:  -  Assess patient's ability to carry out ADLs; assess patient's baseline for ADL function and identify physical deficits which impact ability to perform ADLs (bathing, care of mouth/teeth, toileting, grooming, dressing, etc.)  - Assess/evaluate cause of self-care deficits   - Assess range of motion  - Assess patient's mobility; develop plan if impaired  - Assess patient's need for assistive devices and provide as appropriate  - Encourage maximum independence but intervene and supervise when necessary  - Involve family in performance of ADLs  - Assess for home care needs following discharge   - Consider OT consult to assist with ADL evaluation and planning for discharge  - Provide patient education as appropriate  Outcome: Progressing  Goal: Maintains/Returns to pre admission functional level  Description: INTERVENTIONS:  - Perform BMAT or MOVE assessment daily.   - Set and communicate daily mobility goal to care team and patient/family/caregiver. - Collaborate with rehabilitation services on mobility goals if consulted  - Perform Range of Motion 4 times a day.   - Dangle patient 3 times a day  - Stand patient 3 times a day  - Ambulate patient 3 times a day  - Out of bed to chair 3 times a day   - Out of bed for meals 3 times a day  - Out of bed for toileting  - Record patient progress and toleration of activity level   Outcome: Progressing  Goal: Patient will remain free of falls  Description: INTERVENTIONS:  - Educate patient/family on patient safety including physical limitations  - Instruct patient to call for assistance with activity   - Consult OT/PT to assist with strengthening/mobility   - Keep Call bell within reach  - Keep bed low and locked with side rails adjusted as appropriate  - Keep care items and personal belongings within reach  - Initiate and maintain comfort rounds  - Make Fall Risk Sign visible to staff  - Offer Toileting every 2 Hours, in advance of need  - Apply yellow socks and bracelet for high fall risk patients  - Consider moving patient to room near nurses station  Outcome: Progressing     Problem: DISCHARGE PLANNING  Goal: Discharge to home or other facility with appropriate resources  Description: INTERVENTIONS:  - Identify barriers to discharge w/patient and caregiver  - Arrange for needed discharge resources and transportation as appropriate  - Identify discharge learning needs (meds, wound care, etc.)  - Arrange for interpretive services to assist at discharge as needed  - Refer to Case Management Department for coordinating discharge planning if the patient needs post-hospital services based on physician/advanced practitioner order or complex needs related to functional status, cognitive ability, or social support system  Outcome: Progressing     Problem: Knowledge Deficit  Goal: Patient/family/caregiver demonstrates understanding of disease process, treatment plan, medications, and discharge instructions  Description: Complete learning assessment and assess knowledge base.   Interventions:  - Provide teaching at level of understanding  - Provide teaching via preferred learning methods  Outcome: Progressing     Problem: RESPIRATORY - ADULT  Goal: Achieves optimal ventilation and oxygenation  Description: INTERVENTIONS:  - Assess for changes in respiratory status  - Assess for changes in mentation and behavior  - Position to facilitate oxygenation and minimize respiratory effort  - Oxygen administered by appropriate delivery if ordered  - Initiate smoking cessation education as indicated  - Encourage broncho-pulmonary hygiene including cough, deep breathe, Incentive Spirometry  - Assess the need for suctioning and aspirate as needed  - Assess and instruct to report SOB or any respiratory difficulty  - Respiratory Therapy support as indicated  Outcome: Progressing     Problem: GENITOURINARY - ADULT  Goal: Maintains or returns to baseline urinary function  Description: INTERVENTIONS:  - Assess urinary function  - Encourage oral fluids to ensure adequate hydration if ordered  - Administer IV fluids as ordered to ensure adequate hydration  - Administer ordered medications as needed  - Offer frequent toileting  - Follow urinary retention protocol if ordered  Outcome: Progressing  Goal: Absence of urinary retention  Description: INTERVENTIONS:  - Assess patient’s ability to void and empty bladder  - Monitor I/O  - Bladder scan as needed  - Discuss with physician/AP medications to alleviate retention as needed  - Discuss catheterization for long term situations as appropriate  Outcome: Progressing     Problem: METABOLIC, FLUID AND ELECTROLYTES - ADULT  Goal: Electrolytes maintained within normal limits  Description: INTERVENTIONS:  - Monitor labs and assess patient for signs and symptoms of electrolyte imbalances  - Administer electrolyte replacement as ordered  - Monitor response to electrolyte replacements, including repeat lab results as appropriate  - Instruct patient on fluid and nutrition as appropriate  Outcome: Progressing  Goal: Fluid balance maintained  Description: INTERVENTIONS:  - Monitor labs   - Monitor I/O and WT  - Instruct patient on fluid and nutrition as appropriate  - Assess for signs & symptoms of volume excess or deficit  Outcome: Progressing  Goal: Glucose maintained within target range  Description: INTERVENTIONS:  - Monitor Blood Glucose as ordered  - Assess for signs and symptoms of hyperglycemia and hypoglycemia  - Administer ordered medications to maintain glucose within target range  - Assess nutritional intake and initiate nutrition service referral as needed  Outcome: Progressing

## 2023-11-09 NOTE — ASSESSMENT & PLAN NOTE
Lab Results   Component Value Date    EGFR 59 11/09/2023    EGFR 76 11/08/2023    EGFR 51 11/07/2023    CREATININE 0.97 11/09/2023    CREATININE 0.79 11/08/2023    CREATININE 1.09 11/07/2023     Baseline Cr between 0.75-1.1  Initial HERIBERTO felt 2/2 prerenal azotemia 2/2 diuresis, reduced PO intake +/- ATN. Patient received 2 doses of Bumex IV 2 g as she had not been responding appropriately to IV 40 Lasix daily. Creatinine went up by 0.5 meeting criteria for an HERIBERTO. This may be prerenal intrinsic or postrenal.  Potential prerenal causes include reduced kidney perfusion due to lisinopril. Intrinsic can also be lisinopril due to ATN, AIN from chemo medications, TLS, crystaline nephropathy from acyclovir, rituxan nephrotoxicity, filgrastim glomerulonephritis. Post renal obstructive could be from urine retention from vincristine or cyclophosphamide bladder fibrosis. Suspect most likely due to medications as a combination of prerenal and intrinsic. FENa was 0.2%, UA shows no casts or signs of infection, urine eosinophils 0%. Patient likely has prerenal HERIBERTO from volume depletion. Received 2 L NS and 1 pRBC and cr went up by 0.07 still and Na and Cl went down, suspect that volume prevented further cr rise and free water excretion impaired by kidney function, allowing hyponatremia to increase. Creatinine increase is likely plateaued and went down the following day. Suspect that now that nephrotoxic medications have been stopped she responded well to Lasix and creatinine downtrended. HERIBERTO now resolved. Will be cautious about nephrotoxins and monitor as restarting EPOCH and ppx. Patient gets volume depleted and overloaded very easily. Especially from chemo. 10/19 Heriberto as cr went up by 0.3 in 48 hours from 0.82 on 10/17 to 1.22 on 10/19. Suspect this is prerenal hypovolemic, will see if patient improves with fluids. She gets overloaded easily so if no improvement suspect CRS again. 10/29 HERIBERTO cr went up by 0.3 again.  She has 922 ml UOP in last 24 hours. PO likely from lasix 40mg IV given yesterday. She is not hypovolemic. Plan:   Continue to monitor UO/renal function. Avoid nephrotoxic agents  Continue to monitor a.m. BMP.

## 2023-11-10 ENCOUNTER — APPOINTMENT (INPATIENT)
Dept: RADIOLOGY | Facility: HOSPITAL | Age: 68
DRG: 004 | End: 2023-11-10
Payer: COMMERCIAL

## 2023-11-10 PROBLEM — E87.5 HYPERKALEMIA: Status: RESOLVED | Noted: 2023-11-03 | Resolved: 2023-11-10

## 2023-11-10 LAB
ANION GAP SERPL CALCULATED.3IONS-SCNC: 8 MMOL/L
ANISOCYTOSIS BLD QL SMEAR: PRESENT
BASOPHILS # BLD MANUAL: 0 THOUSAND/UL (ref 0–0.1)
BASOPHILS NFR MAR MANUAL: 0 % (ref 0–1)
BUN SERPL-MCNC: 44 MG/DL (ref 5–25)
CALCIUM SERPL-MCNC: 9.6 MG/DL (ref 8.4–10.2)
CHLORIDE SERPL-SCNC: 104 MMOL/L (ref 96–108)
CO2 SERPL-SCNC: 31 MMOL/L (ref 21–32)
CREAT SERPL-MCNC: 1.04 MG/DL (ref 0.6–1.3)
EOSINOPHIL # BLD MANUAL: 0.44 THOUSAND/UL (ref 0–0.4)
EOSINOPHIL NFR BLD MANUAL: 1 % (ref 0–6)
ERYTHROCYTE [DISTWIDTH] IN BLOOD BY AUTOMATED COUNT: 16 % (ref 11.6–15.1)
ERYTHROCYTE [DISTWIDTH] IN BLOOD BY AUTOMATED COUNT: 16.1 % (ref 11.6–15.1)
GFR SERPL CREATININE-BSD FRML MDRD: 54 ML/MIN/1.73SQ M
GLUCOSE SERPL-MCNC: 105 MG/DL (ref 65–140)
HCT VFR BLD AUTO: 22.2 % (ref 34.8–46.1)
HCT VFR BLD AUTO: 22.8 % (ref 34.8–46.1)
HGB BLD-MCNC: 7.2 G/DL (ref 11.5–15.4)
HGB BLD-MCNC: 7.3 G/DL (ref 11.5–15.4)
HYPERCHROMIA BLD QL SMEAR: PRESENT
LDH SERPL-CCNC: 157 U/L (ref 140–271)
LYMPHOCYTES # BLD AUTO: 0.44 THOUSAND/UL (ref 0.6–4.47)
LYMPHOCYTES # BLD AUTO: 1 % (ref 14–44)
MCH RBC QN AUTO: 31.3 PG (ref 26.8–34.3)
MCH RBC QN AUTO: 31.9 PG (ref 26.8–34.3)
MCHC RBC AUTO-ENTMCNC: 32 G/DL (ref 31.4–37.4)
MCHC RBC AUTO-ENTMCNC: 32.4 G/DL (ref 31.4–37.4)
MCV RBC AUTO: 98 FL (ref 82–98)
MCV RBC AUTO: 98 FL (ref 82–98)
MONOCYTES # BLD AUTO: 0 THOUSAND/UL (ref 0–1.22)
MONOCYTES NFR BLD: 0 % (ref 4–12)
NEUTROPHILS # BLD MANUAL: 42.92 THOUSAND/UL (ref 1.85–7.62)
NEUTS BAND NFR BLD MANUAL: 2 % (ref 0–8)
NEUTS SEG NFR BLD AUTO: 96 % (ref 43–75)
PHOSPHATE SERPL-MCNC: 4.5 MG/DL (ref 2.3–4.1)
PLATELET # BLD AUTO: 348 THOUSANDS/UL (ref 149–390)
PLATELET # BLD AUTO: 360 THOUSANDS/UL (ref 149–390)
PLATELET BLD QL SMEAR: ADEQUATE
PMV BLD AUTO: 9.1 FL (ref 8.9–12.7)
PMV BLD AUTO: 9.5 FL (ref 8.9–12.7)
POTASSIUM SERPL-SCNC: 3.5 MMOL/L (ref 3.5–5.3)
RBC # BLD AUTO: 2.26 MILLION/UL (ref 3.81–5.12)
RBC # BLD AUTO: 2.33 MILLION/UL (ref 3.81–5.12)
RBC MORPH BLD: PRESENT
SODIUM SERPL-SCNC: 143 MMOL/L (ref 135–147)
URATE SERPL-MCNC: 5.9 MG/DL (ref 2–7.5)
WBC # BLD AUTO: 41.22 THOUSAND/UL (ref 4.31–10.16)
WBC # BLD AUTO: 43.8 THOUSAND/UL (ref 4.31–10.16)

## 2023-11-10 PROCEDURE — 84100 ASSAY OF PHOSPHORUS: CPT | Performed by: INTERNAL MEDICINE

## 2023-11-10 PROCEDURE — 87205 SMEAR GRAM STAIN: CPT

## 2023-11-10 PROCEDURE — 85027 COMPLETE CBC AUTOMATED: CPT

## 2023-11-10 PROCEDURE — 71045 X-RAY EXAM CHEST 1 VIEW: CPT

## 2023-11-10 PROCEDURE — 94640 AIRWAY INHALATION TREATMENT: CPT

## 2023-11-10 PROCEDURE — 83615 LACTATE (LD) (LDH) ENZYME: CPT | Performed by: INTERNAL MEDICINE

## 2023-11-10 PROCEDURE — 85007 BL SMEAR W/DIFF WBC COUNT: CPT

## 2023-11-10 PROCEDURE — 99233 SBSQ HOSP IP/OBS HIGH 50: CPT | Performed by: INTERNAL MEDICINE

## 2023-11-10 PROCEDURE — 84550 ASSAY OF BLOOD/URIC ACID: CPT | Performed by: INTERNAL MEDICINE

## 2023-11-10 PROCEDURE — 94669 MECHANICAL CHEST WALL OSCILL: CPT

## 2023-11-10 PROCEDURE — 87070 CULTURE OTHR SPECIMN AEROBIC: CPT

## 2023-11-10 PROCEDURE — 80048 BASIC METABOLIC PNL TOTAL CA: CPT

## 2023-11-10 PROCEDURE — 94760 N-INVAS EAR/PLS OXIMETRY 1: CPT

## 2023-11-10 RX ORDER — SODIUM CHLORIDE, SODIUM GLUCONATE, SODIUM ACETATE, POTASSIUM CHLORIDE, MAGNESIUM CHLORIDE, SODIUM PHOSPHATE, DIBASIC, AND POTASSIUM PHOSPHATE .53; .5; .37; .037; .03; .012; .00082 G/100ML; G/100ML; G/100ML; G/100ML; G/100ML; G/100ML; G/100ML
500 INJECTION, SOLUTION INTRAVENOUS ONCE
Status: COMPLETED | OUTPATIENT
Start: 2023-11-10 | End: 2023-11-11

## 2023-11-10 RX ADMIN — QUETIAPINE FUMARATE 50 MG: 25 TABLET ORAL at 21:00

## 2023-11-10 RX ADMIN — LEVALBUTEROL HYDROCHLORIDE 1.25 MG: 1.25 SOLUTION RESPIRATORY (INHALATION) at 02:04

## 2023-11-10 RX ADMIN — SODIUM CHLORIDE SOLN NEBU 3% 4 ML: 3 NEBU SOLN at 07:53

## 2023-11-10 RX ADMIN — ACYCLOVIR 400 MG: 200 CAPSULE ORAL at 09:13

## 2023-11-10 RX ADMIN — IPRATROPIUM BROMIDE 0.5 MG: 0.5 SOLUTION RESPIRATORY (INHALATION) at 19:51

## 2023-11-10 RX ADMIN — OXYCODONE HYDROCHLORIDE 5 MG: 5 SOLUTION ORAL at 04:42

## 2023-11-10 RX ADMIN — GABAPENTIN 300 MG: 300 CAPSULE ORAL at 20:46

## 2023-11-10 RX ADMIN — SODIUM CHLORIDE SOLN NEBU 3% 4 ML: 3 NEBU SOLN at 14:25

## 2023-11-10 RX ADMIN — SERTRALINE 75 MG: 25 TABLET, FILM COATED ORAL at 09:14

## 2023-11-10 RX ADMIN — LEVALBUTEROL HYDROCHLORIDE 1.25 MG: 1.25 SOLUTION RESPIRATORY (INHALATION) at 07:53

## 2023-11-10 RX ADMIN — IPRATROPIUM BROMIDE 0.5 MG: 0.5 SOLUTION RESPIRATORY (INHALATION) at 14:25

## 2023-11-10 RX ADMIN — ENOXAPARIN SODIUM 40 MG: 40 INJECTION SUBCUTANEOUS at 09:13

## 2023-11-10 RX ADMIN — FORMOTEROL FUMARATE DIHYDRATE 20 MCG: 20 SOLUTION RESPIRATORY (INHALATION) at 09:39

## 2023-11-10 RX ADMIN — FUROSEMIDE 40 MG: 10 INJECTION, SOLUTION INTRAMUSCULAR; INTRAVENOUS at 18:01

## 2023-11-10 RX ADMIN — FUROSEMIDE 40 MG: 10 INJECTION, SOLUTION INTRAMUSCULAR; INTRAVENOUS at 09:13

## 2023-11-10 RX ADMIN — ACYCLOVIR 400 MG: 200 CAPSULE ORAL at 18:01

## 2023-11-10 RX ADMIN — FORMOTEROL FUMARATE DIHYDRATE 20 MCG: 20 SOLUTION RESPIRATORY (INHALATION) at 19:51

## 2023-11-10 RX ADMIN — GABAPENTIN 300 MG: 300 CAPSULE ORAL at 09:14

## 2023-11-10 RX ADMIN — FAMOTIDINE 20 MG: 20 TABLET, FILM COATED ORAL at 09:14

## 2023-11-10 RX ADMIN — LEVALBUTEROL HYDROCHLORIDE 1.25 MG: 1.25 SOLUTION RESPIRATORY (INHALATION) at 19:51

## 2023-11-10 RX ADMIN — FLUCONAZOLE 400 MG: 100 TABLET ORAL at 09:13

## 2023-11-10 RX ADMIN — AMLODIPINE BESYLATE 10 MG: 5 TABLET ORAL at 09:14

## 2023-11-10 RX ADMIN — OXYCODONE HYDROCHLORIDE 5 MG: 5 SOLUTION ORAL at 20:47

## 2023-11-10 RX ADMIN — NICOTINE 7 MG: 7 PATCH, EXTENDED RELEASE TRANSDERMAL at 09:15

## 2023-11-10 RX ADMIN — OXYCODONE HYDROCHLORIDE 5 MG: 5 SOLUTION ORAL at 12:43

## 2023-11-10 RX ADMIN — BUDESONIDE 0.5 MG: 0.5 INHALANT ORAL at 07:53

## 2023-11-10 RX ADMIN — BUSPIRONE HYDROCHLORIDE 30 MG: 10 TABLET ORAL at 20:46

## 2023-11-10 RX ADMIN — METOPROLOL TARTRATE 25 MG: 25 TABLET, FILM COATED ORAL at 09:14

## 2023-11-10 RX ADMIN — BUSPIRONE HYDROCHLORIDE 30 MG: 10 TABLET ORAL at 18:01

## 2023-11-10 RX ADMIN — IPRATROPIUM BROMIDE 0.5 MG: 0.5 SOLUTION RESPIRATORY (INHALATION) at 02:04

## 2023-11-10 RX ADMIN — CHLORHEXIDINE GLUCONATE 15 ML: 1.2 SOLUTION ORAL at 09:14

## 2023-11-10 RX ADMIN — BUDESONIDE 0.5 MG: 0.5 INHALANT ORAL at 19:52

## 2023-11-10 RX ADMIN — FAMOTIDINE 20 MG: 20 TABLET, FILM COATED ORAL at 18:01

## 2023-11-10 RX ADMIN — MELATONIN 3 MG: at 21:00

## 2023-11-10 RX ADMIN — SULFAMETHOXAZOLE AND TRIMETHOPRIM 1 TABLET: 800; 160 TABLET ORAL at 09:14

## 2023-11-10 RX ADMIN — BUSPIRONE HYDROCHLORIDE 30 MG: 10 TABLET ORAL at 09:13

## 2023-11-10 RX ADMIN — GABAPENTIN 300 MG: 300 CAPSULE ORAL at 18:01

## 2023-11-10 RX ADMIN — LEVALBUTEROL HYDROCHLORIDE 1.25 MG: 1.25 SOLUTION RESPIRATORY (INHALATION) at 14:25

## 2023-11-10 RX ADMIN — IPRATROPIUM BROMIDE 0.5 MG: 0.5 SOLUTION RESPIRATORY (INHALATION) at 07:53

## 2023-11-10 RX ADMIN — SODIUM CHLORIDE, SODIUM GLUCONATE, SODIUM ACETATE, POTASSIUM CHLORIDE, MAGNESIUM CHLORIDE, SODIUM PHOSPHATE, DIBASIC, AND POTASSIUM PHOSPHATE 500 ML: .53; .5; .37; .037; .03; .012; .00082 INJECTION, SOLUTION INTRAVENOUS at 23:08

## 2023-11-10 RX ADMIN — CHLORHEXIDINE GLUCONATE 15 ML: 1.2 SOLUTION ORAL at 20:46

## 2023-11-10 RX ADMIN — FOLIC ACID 1 MG: 1 TABLET ORAL at 09:14

## 2023-11-10 RX ADMIN — METOPROLOL TARTRATE 25 MG: 25 TABLET, FILM COATED ORAL at 20:47

## 2023-11-10 RX ADMIN — SODIUM CHLORIDE SOLN NEBU 3% 4 ML: 3 NEBU SOLN at 19:51

## 2023-11-10 NOTE — ASSESSMENT & PLAN NOTE
9/14 Neck/ soft tissue CT: Large enhancing soft tissue mass identified within the left neck involving multiple spaces. Centered within the left oropharynx with extensions as described above into multiple spaces. This results in significant airway compromise. 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 Small level 3/level 4 nodes are seen within the neck. Tracheostomy by ENT. Replaced on 9/26. H/o tobacco abuse, daily smoker. On nicotine while inpatient, confirmed with patient she needs nicotine patch. CT soft tissue neck shows reduced mass effect from 9/14 to 10/13. Can consider reducing trach size if continues to improve. Patient often refusing peg tube feeds but is feeding herself small meals orally. Plan:  ENT and heme onc following  As per speech therapist diet  Dysphagia 2. Continue parallel PEG tube feeds for nutrition. See acute respiratory failure and large B cell lymphoma above. verbal cues/1 person assist

## 2023-11-10 NOTE — QUICK NOTE
ID Plan of Care Note:    Clinical data including documentation, laboratory and microbiology data reviewed. The patient remains on trach collar at 50%. With also count significantly elevated today to 43, however the patient received Granix yesterday. She remains afebrile. Remaining pending infectious work-up has returned; histoplasma urine antigen, Aspergillus galactomannan antigen, Aspergillus antibodies are negative. Concern remains for ongoing hypoxia due to mucous plugging and a possible drug-induced pneumonitis. No evidence of an infectious process at this time. Continue to monitor off antimicrobials other than what she is receiving for chemotherapy prophylaxis. ID will sign off at this time, please call with questions.

## 2023-11-10 NOTE — PROGRESS NOTES
[address] [facility]  Progress Note  Name: Greta Amador  MRN: [id number]  Unit/Bed#: ICU 03 I Date of Admission: 9/13/2023   Date of Service: 11/10/2023 I Hospital Day: 62    Assessment/Plan   * Acute respiratory failure with hypoxia secondary to oropharyngeal mass  Assessment & Plan  Cuffless trach placed 9/17, transitioned to cuffed on 10/3. Oxygen requirements not improving. For mental health patient is on buspirone, quetiapine, and sertraline. For pain, gabapentin, oxycodone scheduled and prn, prn oxycodone mainly utilized for increased pain during chemo and after a bronch. On Guaifenesin, Atrovent and Xopenex for airway maintenance. No improvement in bilateral consolidation or atelectasis and groundglass opacities on repeat CT and chest x-rays. Bronchial cx with 3 colonies CoNS. PCP PCR invalid, repeat negative. CTCAP 10/12:  indicates additional groundglass opacity with paraseptal emphysema, which may be contributing to inability to remain off vent. Bronchoscopy performed and unremarkable. Samples sent to lab for culture. Patient was placed back on vent s/p procedure. Now on high flow. Completed 7 day course of cefepime and Vancomycin. Bronchial culture and gram stain negative. CMV, AFB, Fungitell and Pneumocystis jiroveci (carinii) negative. Histoplasma antigen, Aspergillus galactomannan antigen and antibody negative. Secretions seen in trach. Cuffless trach was placed 9/17. Replaced with a cuffed Shiley XLT #7 10/3    Plan:  Ordered sputum culture from tracheal secretions. ID consulted, appreciate recommendations  Likely Chemo induced pneumonitis. Continue inhalation treatment with Pulmicort q12, duonebs QID, and symbicort q12. Continue trach collar during the day and at night. Continue her on dysphagia 2 diet. Wean down HFNC to a goal FiO2 of 50% to be discharged  Palliative on board to discuss goals of care. Patient motivated to work with PT.   Discussion with daughter led to the decision yesterday that we will decide by Monday about whether patient will go home if she continues to desaturate and not qualify for SNF. Patient wants to continue to have chemotherapy. Large cell lymphoma ([address])  Assessment & Plan  Large B-cell lymphoma, stage II. First chemo cycle 9/22 4 days of R-EPOCH. 9/27 Rituxan. 9/28 Filgrastim. Received nivestym 300 mc 7 days dcd on 10/26      Prophylaxis:  Bactrim prophylaxis Monday Wednesday Friday,  Acyclovir 400 mg twice daily  Fluconazole 200 mg daily  Levofloxacin 250 mg daily  Lovenox 40 mg daily    9/22  4 days of R-EPOCH.  9/27 Rituxan  10/13 for [facility] cycle 2, which was done over 4 days and cytoxan on 10/19.  11/6 completed R-EPOCH cycle 3 and Cytoxan 11/7. Plan:  Oncology is following. Plan for repeat PET-CT on hospital discharge to evaluate disease response. Transfuse blood products as needed. Keep Hgb>7 and Plt>20 for chemo   See acute respiratory failure above    Oropharyngeal mass  Assessment & Plan  9/14 Neck/ soft tissue CT: Large enhancing soft tissue mass identified within the left neck involving multiple spaces. Centered within the left oropharynx with extensions as described above into multiple spaces. This results in significant airway compromise. 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 Small level 3/level 4 nodes are seen within the neck. Tracheostomy by ENT. Replaced on 9/26. H/o tobacco abuse, daily smoker. On nicotine while inpatient, confirmed with patient she needs nicotine patch. CT soft tissue neck shows reduced mass effect from 9/14 to 10/13. Can consider reducing trach size if continues to improve. Patient often refusing peg tube feeds but is feeding herself small meals orally. Plan:  ENT and heme onc following  As per speech therapist diet  Dysphagia 2. Continue parallel PEG tube feeds for nutrition. See acute respiratory failure and large B cell lymphoma above.     Hyperkalemia-resolved as of 11/10/2023  Assessment & Plan  Recent Labs     11/08/23  0634 11/09/23  0515 11/10/23  0502   K 4.2 3.8 3.5   MG 1.7* 3.1*  --          Workup:  Etiology: Patient takes bactrim. Tumor lysis would be less likely since calcium is elevated rather than decreased. Uric acid is normal.   K+ elevated but returned to normal after medical management. Will give another round of medical management if elevated again. Plan:  Trend potassium on daily BMP. Pancytopenia due to chemotherapy (HCC)-resolved as of 11/3/2023  Assessment & Plan  Patient received 1 PRBC transfusion on 10/5. Hemoglobin 8.2 s/p 1 PRBC transfusion on 10/25. B/L is 12-14. No sites of bleeding, no black stools. Iron panel indicative of inflammatory anemia. Normal Vitamin B12 levels, negative hemolysis smear. Folate is low 5.5. Plan:  Trend hemoglobin and transfuse for <7. As per heme/onc transfuse irradiated and leukoreduced blood products. Trend platelets  Folic acid 1mg daily supplementation  As per my conversation with Heme/oncology attending, patient is expected to have chemotherapy associated pancytopenia. No further anemia w/u required. PO (acute kidney injury) (HCC)-resolved as of 9/21/2023  Assessment & Plan  Lab Results   Component Value Date    CREATININE 1.04 11/10/2023    CREATININE 0.97 11/09/2023    CREATININE 0.79 11/08/2023     Likely prerenal.  Second to poor oral intake versus poor urine output. Baseline creatinine 1 to 1.2. Cr at baseline  Plan:  Urinary retention protocol, Daily weights, I/O  Trend Cr daily    RML pneumonia-resolved as of 9/22/2023  Assessment & Plan  9/13 CT PE study: Patchy consolidation right middle lobe, new from 8/25/2023, compatible with pneumonia. No leukocytosis, no fever/chills. Positive for sore throat, cough. New onset pneumonia after recent hospitalization and antibiotics treatment. 9/14: Bronchoscopy/ ABL. MRSA negative.    ABL revealed 2+ Growth of Candida albicans, suspected contaminant. Blister (nonthermal) of left forearm, sequela  Assessment & Plan  Blisters of left upper extremity healing, no signs of infection, continue daily wound care. Generalized abdominal pain  Assessment & Plan  Patient reports abdominal pain which is unchanged since 9/22 no guarding on exam. Takes Famotidine for GERD at home. Alk phos 10/2 145, 10/20 79. CTCAP reveals complex right upper pole renal lesion requiring possible outpatient MRI    Continue Famotidine  On bowel regimen with Senna and Miralax prn    (HFpEF) heart failure with preserved ejection fraction [facility])  Assessment & Plan  Wt Readings from Last 3 Encounters:   11/08/23 79.5 kg (175 lb 4.3 oz)   09/07/23 74.6 kg (164 lb 6.4 oz)   08/30/23 73.3 kg (161 lb 9.6 oz)     Lab Results   Component Value Date    LVEF 60 08/28/2023     (H) 10/28/2023     (H) 09/29/2023     Echo 8/28/23: LVEF 60%. Plan:  K > 4   Measure I/O      Ambulatory dysfunction  Assessment & Plan  2/2 Impacted by chronic pain syndrome + prolonged hospital stay. Continue OOB with PT. PT rec post acute rehab however reportedly Cannot get LTACH placement while getting chemo per CM  She is aware STR SNFs continue to follow and treatment can be done from a SNF level of care. PT would need to be on trach collar for at least 72 hours with no need for the vent. Aware that pt would need to be around 28% FiO2     Depression  Assessment & Plan  Patient on Zoloft 75 daily and Seroquel hs  Patient is depressed, multiple family/palliative discussions. patient is firm that she wants to live and to fight, she is just tired. Currently goal is disease treatment oriented. Full code. No SI/HI ideations. Palliative signed off, will reconsult if patient changes her mind regarding wishes. Chronic Superficial thrombophlebitis  Assessment & Plan  Right forearm soreness. RUE US: No acute or chronic deep vein thrombosis.  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 HERIBERTO not severe enough to require renal dosing at this time, will continue to monitor and adjust dosing as needed. Continue DVT ppx with Lovenox 40    CKD (chronic kidney disease) stage 3, GFR 30-59 ml/min [facility])  Assessment & Plan  Lab Results   Component Value Date    EGFR 54 11/10/2023    EGFR 59 11/09/2023    EGFR 76 11/08/2023    CREATININE 1.04 11/10/2023    CREATININE 0.97 11/09/2023    CREATININE 0.79 11/08/2023     Baseline Cr between 0.75-1.1  Initial HERIBERTO felt 2/2 prerenal azotemia 2/2 diuresis, reduced PO intake +/- ATN. Patient received 2 doses of Bumex IV 2 g as she had not been responding appropriately to IV 40 Lasix daily. Creatinine went up by 0.5 meeting criteria for an HERIBERTO. This may be prerenal intrinsic or postrenal.  Potential prerenal causes include reduced kidney perfusion due to lisinopril. Intrinsic can also be lisinopril due to ATN, AIN from chemo medications, TLS, crystaline nephropathy from acyclovir, rituxan nephrotoxicity, filgrastim glomerulonephritis. Post renal obstructive could be from urine retention from vincristine or cyclophosphamide bladder fibrosis. Suspect most likely due to medications as a combination of prerenal and intrinsic. FENa was 0.2%, UA shows no casts or signs of infection, urine eosinophils 0%. Patient likely has prerenal HERIBERTO from volume depletion. Received 2 L NS and 1 pRBC and cr went up by 0.07 still and Na and Cl went down, suspect that volume prevented further cr rise and free water excretion impaired by kidney function, allowing hyponatremia to increase. Creatinine increase is likely plateaued and went down the following day. Suspect that now that nephrotoxic medications have been stopped she responded well to Lasix and creatinine downtrended. HERIBERTO now resolved. Will be cautious about nephrotoxins and monitor as restarting EPOCH and ppx. Patient gets volume depleted and overloaded very easily. Especially from chemo.      10/19 Heriberto as cr went up by 0.3 in 48 hours from 0.82 on 10/17 to 1.22 on 10/19. Suspect this is prerenal hypovolemic, will see if patient improves with fluids. She gets overloaded easily so if no improvement suspect CRS again. 10/29 PO cr went up by 0.3 again. She has 922 ml UOP in last 24 hours. PO likely from lasix 40mg IV given yesterday. She is not hypovolemic. Plan:   Continue to monitor UO/renal function. Avoid nephrotoxic agents  Continue to monitor a.m. BMP. Pain syndrome, chronic  Assessment & Plan  Home regimen: Oxycodone 5 mg BID, Tylenol 650 mg q8 prn. Gabapentin 600 mg TID. Medical marijuana. Lumbar radiculopathy and impaired ambulatory dysfunction secondary to pain. Has bowel regimen and is having bowel movements daily. Patient required additional pain management during previous cycle and has some additional pain from tunneled line placement    Plan:  Continue gabapentin 300 mg TID, oxycodone 5 mg TID, prn Tylenol 650 mg q6. Oxycodone 5mg every 8 hours  Oxycodone 2.5 mg q6 PRN    Benign essential hypertension  Assessment & Plan  Home regimen Coreg 12.5 mg BID, Amlodipine 10 mg daily, and lisinopril 40 mg. All discontinued at this time secondary to hypotension and PO since cycle 1. but stable currently without any antihypertensives. Systolic persistently elevated and tachycardic, potentially secondary to pain. Patient was more hypotensive during last chemo. Coreg contraindicated during chemo, since cycles are every other week, would be better to stick with lopressor during duration of therapy as opposed to switching constantly. Plan:  Monitor vitals. Continue Norvasc 10 and lopressor 25 bid  Prn hydralazine for SBP > 160    Angioedema-resolved as of 11/4/2023  Assessment & Plan  POA in [place] ED: Swollen tongue, soft and hard palate with severe angioedema. Decadron 10 mg, Benadryl 25 mg given. 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     Plan:  Cuffless trach 9/17, cuffed Federico XLT #7 10/3  See acute respiratory failure and oropharyngeal mass above    Hyperlipidemia-resolved as of 10/26/2023  Assessment & Plan  Lab Results   Component Value Date    CHOLESTEROL 118 10/09/2023    HDL 14 (L) 10/09/2023    TRIG 144 10/09/2023    [address] 10/09/2023     Continue outpatient diet and lifestyle modification. COPD without exacerbation (HCC)-resolved as of 9/26/2023  Assessment & Plan  Continue vent with nebs. Encephalopathy-resolved as of 9/21/2023  Assessment & Plan  9/19- Pt appeared to be AAO x3. Improving. ICU Core Measures     Vented Patient  VAP Bundle  VAP bundle ordered      A: Assess, Prevent, and Manage Pain Has pain been assessed? Yes  Need for changes to pain regimen? No   B: Both Spontaneous Awakening Trials (SATs) and Spontaneous Breathing Trials (SBTs) Plan to perform spontaneous awakening trial today? N/A       C: Choice of Sedation RASS Goal: 0 Alert and Calm  Need for changes to sedation or analgesia regimen? No   D: Delirium CAM-ICU: Negative   E: Early Mobility  Plan for early mobility? Yes   F: Family Engagement Plan for family engagement today? Yes         Antibiotic Review: Patient on appropriate coverage based on culture data. Review of Invasive Devices:        Central access plan: Medications requiring central line      Prophylaxis:  VTE VTE covered by:  enoxaparin, Subcutaneous, 40 mg at 11/09/23 0857       Stress Ulcer  covered byfamotidine (PEPCID) tablet 20 mg [[id number]]       Subjective   HPI/24hr events: Patient desatted to 50s when repositioning of trach mask. After a minute, her saturation was back to 80s. There was no need to increase her oxygen. ROS deferred as patient did not want to speak with me.        Objective                            Vitals I/O      Most Recent Min/Max in 24hrs   Temp 98.8 °F (37.1 °C) Temp  Min: 98.4 °F (36.9 °C)  Max: 99 °F (37.2 °C)   Pulse 91 Pulse  Min: 83  Max: 112   Resp 16 Resp  Min: 8  Max: 33   BP 123/60 BP  Min: 79/51  Max: 142/75   O2 Sat 95 % SpO2  Min: 90 %  Max: 100 %      Intake/Output Summary (Last 24 hours) at 11/10/2023 0807  Last data filed at 11/10/2023 0430  Gross per 24 hour   Intake 120 ml   Output 1225 ml   Net -1105 ml         Diet Dysphagia/Modified Consistency; Dysphagia 2-Mechanical Soft; Honey Thick Liquid     Invasive Monitoring Physical exam    Physical Exam  Neck:      Trachea: Tracheostomy present. Constitutional:       Appearance: She is ill-appearing. Patient sitting in her bed, silently, said no to being examined.       Diagnostic Studies      EKG: NSR  Imaging: N/A      Medications:  Scheduled PRN   acyclovir, 400 mg, BID  amLODIPine, 10 mg, Daily  budesonide, 0.5 mg, Q12H  busPIRone, 30 mg, TID  chlorhexidine, 15 mL, Q12H ASHLEY  enoxaparin, 40 mg, Q24H ASHLEY  famotidine, 20 mg, BID  fluconazole, 470 mg, Daily  folic acid, 1 mg, Daily  formoterol, 20 mcg, Q12H  furosemide, 40 mg, BID (diuretic)  gabapentin, 300 mg, TID  levalbuterol, 1.25 mg, Q6H   And  ipratropium, 0.5 mg, Q6H  melatonin, 3 mg, HS  metoprolol tartrate, 25 mg, Q12H ASHLEY  nicotine, 7 mg, Daily  oxyCODONE, 5 mg, Q8H  QUEtiapine, 50 mg, HS  senna, 8.8 mg, HS  sertraline, 75 mg, Daily  sodium chloride, 4 mL, TID  sulfamethoxazole-trimethoprim, 1 tablet, Once per day on Mon Wed Fri      alteplase, 2 mg, Q1MIN PRN  alteplase, 2 mg, Q1MIN PRN  alteplase, 2 mg, Q1MIN PRN  ondansetron, 4 mg, Q8H PRN  oxyCODONE, 2.5 mg, Q6H PRN       Continuous          Labs:    CBC    Recent Labs     11/09/23  0515 11/10/23  0502   WBC 9.76 41.22*   HGB 7.6* 7.2*   HCT 23.6* 22.2*   * 348     BMP    Recent Labs     11/09/23  0515 11/10/23  0502   SODIUM 141 143   K 3.8 3.5    104   CO2 31 31   AGAP 5 8   BUN 40* 44*   CREATININE 0.97 1.04   CALCIUM 10.0 9.6       Coags    No recent results     Additional Electrolytes  Recent Labs     11/09/23  0515 11/09/23  0521 11/10/23  0502   MG 3.1*  --   --    PHOS  --  4.5* 4.5* Blood Gas    No recent results  Recent Labs     11/09/23 0515   PHVEN 7.380   RMU2HTZ 54.7*   PO2VEN 38.4   [id number] 31.6*   BEVEN 5.7    LFTs  No recent results    Infectious  No recent results  Glucose  Recent Labs     11/09/23  0515 11/10/23  0502   GLUC 95 105                 Jose Alfredo Sosa MD

## 2023-11-10 NOTE — ASSESSMENT & PLAN NOTE
Lab Results   Component Value Date    EGFR 54 11/10/2023    EGFR 59 11/09/2023    EGFR 76 11/08/2023    CREATININE 1.04 11/10/2023    CREATININE 0.97 11/09/2023    CREATININE 0.79 11/08/2023     Baseline Cr between 0.75-1.1  Initial HERIBERTO felt 2/2 prerenal azotemia 2/2 diuresis, reduced PO intake +/- ATN. Patient received 2 doses of Bumex IV 2 g as she had not been responding appropriately to IV 40 Lasix daily. Creatinine went up by 0.5 meeting criteria for an HERIBERTO. This may be prerenal intrinsic or postrenal.  Potential prerenal causes include reduced kidney perfusion due to lisinopril. Intrinsic can also be lisinopril due to ATN, AIN from chemo medications, TLS, crystaline nephropathy from acyclovir, rituxan nephrotoxicity, filgrastim glomerulonephritis. Post renal obstructive could be from urine retention from vincristine or cyclophosphamide bladder fibrosis. Suspect most likely due to medications as a combination of prerenal and intrinsic. FENa was 0.2%, UA shows no casts or signs of infection, urine eosinophils 0%. Patient likely has prerenal HERIBERTO from volume depletion. Received 2 L NS and 1 pRBC and cr went up by 0.07 still and Na and Cl went down, suspect that volume prevented further cr rise and free water excretion impaired by kidney function, allowing hyponatremia to increase. Creatinine increase is likely plateaued and went down the following day. Suspect that now that nephrotoxic medications have been stopped she responded well to Lasix and creatinine downtrended. HERIBERTO now resolved. Will be cautious about nephrotoxins and monitor as restarting EPOCH and ppx. Patient gets volume depleted and overloaded very easily. Especially from chemo. 10/19 Heriberto as cr went up by 0.3 in 48 hours from 0.82 on 10/17 to 1.22 on 10/19. Suspect this is prerenal hypovolemic, will see if patient improves with fluids. She gets overloaded easily so if no improvement suspect CRS again. 10/29 HERIBERTO cr went up by 0.3 again.  She has 922 ml UOP in last 24 hours. PO likely from lasix 40mg IV given yesterday. She is not hypovolemic. Plan:   Continue to monitor UO/renal function. Avoid nephrotoxic agents  Continue to monitor a.m. BMP.

## 2023-11-10 NOTE — PLAN OF CARE
Problem: MOBILITY - ADULT  Goal: Maintain or return to baseline ADL function  Description: INTERVENTIONS:  -  Assess patient's ability to carry out ADLs; assess patient's baseline for ADL function and identify physical deficits which impact ability to perform ADLs (bathing, care of mouth/teeth, toileting, grooming, dressing, etc.)  - Assess/evaluate cause of self-care deficits   - Assess range of motion  - Assess patient's mobility; develop plan if impaired  - Assess patient's need for assistive devices and provide as appropriate  - Encourage maximum independence but intervene and supervise when necessary  - Involve family in performance of ADLs  - Assess for home care needs following discharge   - Consider OT consult to assist with ADL evaluation and planning for discharge  - Provide patient education as appropriate  Outcome: Progressing  Goal: Maintains/Returns to pre admission functional level  Description: INTERVENTIONS:  - Perform BMAT or MOVE assessment daily.   - Set and communicate daily mobility goal to care team and patient/family/caregiver. - Collaborate with rehabilitation services on mobility goals if consulted  - Perform Range of Motion  times a day. - Reposition patient every  hours.   - Dangle patient  times a day  - Stand patient  times a day  - Ambulate patient  times a day  - Out of bed to chair  times a day   - Out of bed for meals  times a day  - Out of bed for toileting  - Record patient progress and toleration of activity level   Outcome: Progressing     Problem: Prexisting or High Potential for Compromised Skin Integrity  Goal: Skin integrity is maintained or improved  Description: INTERVENTIONS:  - Identify patients at risk for skin breakdown  - Assess and monitor skin integrity  - Assess and monitor nutrition and hydration status  - Monitor labs   - Assess for incontinence   - Turn and reposition patient  - Assist with mobility/ambulation  - Relieve pressure over bony prominences  - Avoid friction and shearing  - Provide appropriate hygiene as needed including keeping skin clean and dry  - Evaluate need for skin moisturizer/barrier cream  - Collaborate with interdisciplinary team   - Patient/family teaching  - Consider wound care consult   Outcome: Progressing     Problem: Nutrition/Hydration-ADULT  Goal: Nutrient/Hydration intake appropriate for improving, restoring or maintaining nutritional needs  Description: Monitor and assess patient's nutrition/hydration status for malnutrition. Collaborate with interdisciplinary team and initiate plan and interventions as ordered. Monitor patient's weight and dietary intake as ordered or per policy. Utilize nutrition screening tool and intervene as necessary. Determine patient's food preferences and provide high-protein, high-caloric foods as appropriate.      INTERVENTIONS:  - Monitor oral intake, urinary output, labs, and treatment plans  - Assess nutrition and hydration status and recommend course of action  - Evaluate amount of meals eaten  - Assist patient with eating if necessary   - Allow adequate time for meals  - Recommend/ encourage appropriate diets, oral nutritional supplements, and vitamin/mineral supplements  - Order, calculate, and assess calorie counts as needed  - Recommend, monitor, and adjust tube feedings and TPN/PPN based on assessed needs  - Assess need for intravenous fluids  - Provide specific nutrition/hydration education as appropriate  - Include patient/family/caregiver in decisions related to nutrition  Outcome: Progressing     Problem: PAIN - ADULT  Goal: Verbalizes/displays adequate comfort level or baseline comfort level  Description: Interventions:  - Encourage patient to monitor pain and request assistance  - Assess pain using appropriate pain scale  - Administer analgesics based on type and severity of pain and evaluate response  - Implement non-pharmacological measures as appropriate and evaluate response  - Consider cultural and social influences on pain and pain management  - Notify physician/advanced practitioner if interventions unsuccessful or patient reports new pain  Outcome: Progressing     Problem: INFECTION - ADULT  Goal: Absence or prevention of progression during hospitalization  Description: INTERVENTIONS:  - Assess and monitor for signs and symptoms of infection  - Monitor lab/diagnostic results  - Monitor all insertion sites, i.e. indwelling lines, tubes, and drains  - Monitor endotracheal if appropriate and nasal secretions for changes in amount and color  - [place] appropriate cooling/warming therapies per order  - Administer medications as ordered  - Instruct and encourage patient and family to use good hand hygiene technique  - Identify and instruct in appropriate isolation precautions for identified infection/condition  Outcome: Progressing  Goal: Absence of fever/infection during neutropenic period  Description: INTERVENTIONS:  - Monitor WBC    Outcome: Progressing     Problem: SAFETY ADULT  Goal: Maintain or return to baseline ADL function  Description: INTERVENTIONS:  -  Assess patient's ability to carry out ADLs; assess patient's baseline for ADL function and identify physical deficits which impact ability to perform ADLs (bathing, care of mouth/teeth, toileting, grooming, dressing, etc.)  - Assess/evaluate cause of self-care deficits   - Assess range of motion  - Assess patient's mobility; develop plan if impaired  - Assess patient's need for assistive devices and provide as appropriate  - Encourage maximum independence but intervene and supervise when necessary  - Involve family in performance of ADLs  - Assess for home care needs following discharge   - Consider OT consult to assist with ADL evaluation and planning for discharge  - Provide patient education as appropriate  Outcome: Progressing  Goal: Maintains/Returns to pre admission functional level  Description: INTERVENTIONS:  - Perform BMAT or MOVE assessment daily.   - Set and communicate daily mobility goal to care team and patient/family/caregiver. - Collaborate with rehabilitation services on mobility goals if consulted  - Perform Range of Motion  times a day. - Reposition patient every  hours.   - Dangle patient  times a day  - Stand patient  times a day  - Ambulate patient  times a day  - Out of bed to chair  times a day   - Out of bed for meals  times a day  - Out of bed for toileting  - Record patient progress and toleration of activity level   Outcome: Progressing  Goal: Patient will remain free of falls  Description: INTERVENTIONS:  - Educate patient/family on patient safety including physical limitations  - Instruct patient to call for assistance with activity   - Consult OT/PT to assist with strengthening/mobility   - Keep Call bell within reach  - Keep bed low and locked with side rails adjusted as appropriate  - Keep care items and personal belongings within reach  - Initiate and maintain comfort rounds  - Make Fall Risk Sign visible to staff  - Offer Toileting every  Hours, in advance of need  - Initiate/Maintain alarm  - Obtain necessary fall risk management equipment:  - Apply yellow socks and bracelet for high fall risk patients  - Consider moving patient to room near nurses station  Outcome: Progressing     Problem: DISCHARGE PLANNING  Goal: Discharge to home or other facility with appropriate resources  Description: INTERVENTIONS:  - Identify barriers to discharge w/patient and caregiver  - Arrange for needed discharge resources and transportation as appropriate  - Identify discharge learning needs (meds, wound care, etc.)  - Arrange for interpretive services to assist at discharge as needed  - Refer to Case Management Department for coordinating discharge planning if the patient needs post-hospital services based on physician/advanced practitioner order or complex needs related to functional status, cognitive ability, or social support system  Outcome: Progressing     Problem: Knowledge Deficit  Goal: Patient/family/caregiver demonstrates understanding of disease process, treatment plan, medications, and discharge instructions  Description: Complete learning assessment and assess knowledge base.   Interventions:  - Provide teaching at level of understanding  - Provide teaching via preferred learning methods  Outcome: Progressing     Problem: RESPIRATORY - ADULT  Goal: Achieves optimal ventilation and oxygenation  Description: INTERVENTIONS:  - Assess for changes in respiratory status  - Assess for changes in mentation and behavior  - Position to facilitate oxygenation and minimize respiratory effort  - Oxygen administered by appropriate delivery if ordered  - Initiate smoking cessation education as indicated  - Encourage broncho-pulmonary hygiene including cough, deep breathe, Incentive Spirometry  - Assess the need for suctioning and aspirate as needed  - Assess and instruct to report SOB or any respiratory difficulty  - Respiratory Therapy support as indicated  Outcome: Progressing     Problem: GENITOURINARY - ADULT  Goal: Maintains or returns to baseline urinary function  Description: INTERVENTIONS:  - Assess urinary function  - Encourage oral fluids to ensure adequate hydration if ordered  - Administer IV fluids as ordered to ensure adequate hydration  - Administer ordered medications as needed  - Offer frequent toileting  - Follow urinary retention protocol if ordered  Outcome: Progressing  Goal: Absence of urinary retention  Description: INTERVENTIONS:  - Assess patient’s ability to void and empty bladder  - Monitor I/O  - Bladder scan as needed  - Discuss with physician/AP medications to alleviate retention as needed  - Discuss catheterization for long term situations as appropriate  Outcome: Progressing     Problem: METABOLIC, FLUID AND ELECTROLYTES - ADULT  Goal: Electrolytes maintained within normal limits  Description: INTERVENTIONS:  - Monitor labs and assess patient for signs and symptoms of electrolyte imbalances  - Administer electrolyte replacement as ordered  - Monitor response to electrolyte replacements, including repeat lab results as appropriate  - Instruct patient on fluid and nutrition as appropriate  Outcome: Progressing  Goal: Fluid balance maintained  Description: INTERVENTIONS:  - Monitor labs   - Monitor I/O and WT  - Instruct patient on fluid and nutrition as appropriate  - Assess for signs & symptoms of volume excess or deficit  Outcome: Progressing  Goal: Glucose maintained within target range  Description: INTERVENTIONS:  - Monitor Blood Glucose as ordered  - Assess for signs and symptoms of hyperglycemia and hypoglycemia  - Administer ordered medications to maintain glucose within target range  - Assess nutritional intake and initiate nutrition service referral as needed  Outcome: Progressing

## 2023-11-10 NOTE — ASSESSMENT & PLAN NOTE
POA in [facility] ED: Swollen tongue, soft and hard palate with severe angioedema. Decadron 10 mg, Benadryl 25 mg given. 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     Plan:  Cuffless trach 9/17, cuffed Shiley XLT #7 10/3  See acute respiratory failure and oropharyngeal mass above

## 2023-11-10 NOTE — ASSESSMENT & PLAN NOTE
Large B-cell lymphoma, stage II. First chemo cycle 9/22 4 days of R-EPOCH. 9/27 Rituxan. 9/28 Filgrastim. Received nivestym 300 mc 7 days dcd on 10/26      Prophylaxis:  Bactrim prophylaxis Monday Wednesday Friday,  Acyclovir 400 mg twice daily  Fluconazole 200 mg daily  Levofloxacin 250 mg daily  Lovenox 40 mg daily    9/22  4 days of R-EPOCH.  9/27 Rituxan  10/13 for [facility] cycle 2, which was done over 4 days and cytoxan on 10/19.  11/6 completed R-EPOCH cycle 3 and Cytoxan 11/7. Plan:  Oncology is following. Plan for repeat PET-CT on hospital discharge to evaluate disease response. Transfuse blood products as needed.   Keep Hgb>7 and Plt>20 for chemo   See acute respiratory failure above

## 2023-11-10 NOTE — QUICK NOTE
Spoke with her daughter Karel Paula on the phone.  She and her family members are available for a family discussion on Monday at 05:00 pm. The meeting for goals of care will include Benjy Maloney (the patient), the patients' son, her , her daughter Cassy Kinsey, the  Elen Mauro, myself and members associated in her care from the medical and critical care team.

## 2023-11-10 NOTE — ASSESSMENT & PLAN NOTE
Lab Results   Component Value Date    CREATININE 1.04 11/10/2023    CREATININE 0.97 11/09/2023    CREATININE 0.79 11/08/2023     Likely prerenal.  Second to poor oral intake versus poor urine output. Baseline creatinine 1 to 1.2.     Cr at baseline  Plan:  Urinary retention protocol, Daily weights, I/O  Trend Cr daily

## 2023-11-10 NOTE — CASE MANAGEMENT
Case Management Progress Note    Patient name Burke Gonzalez  Location ICU 03/ICU 03 MRN [id number]  : 1955 Date 11/10/2023       LOS (days): 62  Geometric Mean LOS (GMLOS) (days): 26.10  Days to GMLOS:-31.9        OBJECTIVE:    Current admission status: Inpatient  Preferred Pharmacy:   [id number] [place] - [address]. [address] [place] [address]  Phone: [phone] Fax: [address] [facility] [place], [place] - [address]  [address] [address]  Phone: [phone] Fax: [phone]    Primary Care Provider: Seymour Cancino MD    Primary Insurance: [facility]  Secondary Insurance: [facility]    PROGRESS NOTE:    Update from [facility]. As per Sanya Diaz: [employer] is able to go up to 55% with a 10L concentrator. A ventilator may improve oxygenation, but likely only slightly in the pt's case. This may be difficult to manage at home. If the physician was considering nocturnal ventilation there would be a set up training, in home education for caregivers, and home inspection. Other option to consider for home would be a non-rebreather. However, there are concerns with this as well. SHIRA spoke with resident, Dr. Ajay Holm and Attending Dr. José Rosas. Reviewed above. As per Dr. José Rosas a home plan is not safe for the pt. Would recommend STR at a SNF. Dr. José Rosas aware of the requirements needed for [facility]. Also, need to re-address [address] [facility] If pt does not improve. Dr. Ajay Holm to discuss further with pt and daughter to schedule a mtg for GOC on Monday. CM encouraged on-going/weekly GOC with pt/family.

## 2023-11-10 NOTE — ASSESSMENT & PLAN NOTE
Recent Labs     11/08/23  0634 11/09/23  0515 11/10/23  0502   K 4.2 3.8 3.5   MG 1.7* 3.1*  --          Workup:  Etiology: Patient takes bactrim. Tumor lysis would be less likely since calcium is elevated rather than decreased. Uric acid is normal.   K+ elevated but returned to normal after medical management. Will give another round of medical management if elevated again. Plan:  Trend potassium on daily BMP. 36

## 2023-11-10 NOTE — NURSING NOTE
Patient continues to refuse tube feeds. Allowing suctioning intermittently as needed. Educated on importance of calorie and protein intake for healing and overall recovery. Adamantly refuses. Allows pills to be administered and flushes via PEG. MD aware.   Keenan Paul

## 2023-11-10 NOTE — ASSESSMENT & PLAN NOTE
Lab Results   Component Value Date    CHOLESTEROL 118 10/09/2023    HDL 14 (L) 10/09/2023    TRIG 144 10/09/2023    [address] [facility] 104 10/09/2023     Continue outpatient diet and lifestyle modification.

## 2023-11-10 NOTE — PLAN OF CARE
Problem: MOBILITY - ADULT  Goal: Maintain or return to baseline ADL function  Description: INTERVENTIONS:  -  Assess patient's ability to carry out ADLs; assess patient's baseline for ADL function and identify physical deficits which impact ability to perform ADLs (bathing, care of mouth/teeth, toileting, grooming, dressing, etc.)  - Assess/evaluate cause of self-care deficits   - Assess range of motion  - Assess patient's mobility; develop plan if impaired  - Assess patient's need for assistive devices and provide as appropriate  - Encourage maximum independence but intervene and supervise when necessary  - Involve family in performance of ADLs  - Assess for home care needs following discharge   - Consider OT consult to assist with ADL evaluation and planning for discharge  - Provide patient education as appropriate  Outcome: Progressing  Goal: Maintains/Returns to pre admission functional level  Description: INTERVENTIONS:  - Perform BMAT or MOVE assessment daily.   - Set and communicate daily mobility goal to care team and patient/family/caregiver.    - Collaborate with rehabilitation services on mobility goals if consulted  -Out of bed 2 times daily  - Record patient progress and toleration of activity level   Outcome: Progressing

## 2023-11-10 NOTE — ASSESSMENT & PLAN NOTE
Cuffless trach placed 9/17, transitioned to cuffed on 10/3. Oxygen requirements not improving. For mental health patient is on buspirone, quetiapine, and sertraline. For pain, gabapentin, oxycodone scheduled and prn, prn oxycodone mainly utilized for increased pain during chemo and after a bronch. On Guaifenesin, Atrovent and Xopenex for airway maintenance. No improvement in bilateral consolidation or atelectasis and groundglass opacities on repeat CT and chest x-rays. Bronchial cx with 3 colonies CoNS. PCP PCR invalid, repeat negative. CTCAP 10/12:  indicates additional groundglass opacity with paraseptal emphysema, which may be contributing to inability to remain off vent. Bronchoscopy performed and unremarkable. Samples sent to lab for culture. Patient was placed back on vent s/p procedure. Now on high flow. Completed 7 day course of cefepime and Vancomycin. Bronchial culture and gram stain negative. CMV, AFB, Fungitell and Pneumocystis jiroveci (carinii) negative. Histoplasma antigen, Aspergillus galactomannan antigen and antibody negative. Secretions seen in trach. Cuffless trach was placed 9/17. Replaced with a cuffed Shiley XLT #7 10/3    Plan:  Ordered sputum culture from tracheal secretions. ID consulted, appreciate recommendations  Likely Chemo induced pneumonitis. Continue inhalation treatment with Pulmicort q12, duonebs QID, and symbicort q12. Continue trach collar during the day and at night. Continue her on dysphagia 2 diet. Wean down HFNC to a goal FiO2 of 50% to be discharged  Palliative on board to discuss goals of care. Patient motivated to work with PT. Discussion with daughter led to the decision yesterday that we will decide by Monday about whether patient will go home if she continues to desaturate and not qualify for SNF. Patient wants to continue to have chemotherapy.

## 2023-11-11 LAB
LDH SERPL-CCNC: 138 U/L (ref 140–271)
PHOSPHATE SERPL-MCNC: 4.5 MG/DL (ref 2.3–4.1)
URATE SERPL-MCNC: 6.9 MG/DL (ref 2–7.5)

## 2023-11-11 PROCEDURE — 99233 SBSQ HOSP IP/OBS HIGH 50: CPT | Performed by: INTERNAL MEDICINE

## 2023-11-11 PROCEDURE — 84100 ASSAY OF PHOSPHORUS: CPT | Performed by: INTERNAL MEDICINE

## 2023-11-11 PROCEDURE — 83615 LACTATE (LD) (LDH) ENZYME: CPT | Performed by: INTERNAL MEDICINE

## 2023-11-11 PROCEDURE — 94640 AIRWAY INHALATION TREATMENT: CPT

## 2023-11-11 PROCEDURE — 94760 N-INVAS EAR/PLS OXIMETRY 1: CPT

## 2023-11-11 PROCEDURE — 84550 ASSAY OF BLOOD/URIC ACID: CPT | Performed by: INTERNAL MEDICINE

## 2023-11-11 RX ADMIN — NICOTINE 7 MG: 7 PATCH, EXTENDED RELEASE TRANSDERMAL at 08:34

## 2023-11-11 RX ADMIN — FAMOTIDINE 20 MG: 20 TABLET, FILM COATED ORAL at 17:10

## 2023-11-11 RX ADMIN — OXYCODONE HYDROCHLORIDE 5 MG: 5 SOLUTION ORAL at 12:15

## 2023-11-11 RX ADMIN — CHLORHEXIDINE GLUCONATE 15 ML: 1.2 SOLUTION ORAL at 08:32

## 2023-11-11 RX ADMIN — ACYCLOVIR 400 MG: 200 CAPSULE ORAL at 17:10

## 2023-11-11 RX ADMIN — FORMOTEROL FUMARATE DIHYDRATE 20 MCG: 20 SOLUTION RESPIRATORY (INHALATION) at 07:46

## 2023-11-11 RX ADMIN — BUDESONIDE 0.5 MG: 0.5 INHALANT ORAL at 19:32

## 2023-11-11 RX ADMIN — FUROSEMIDE 40 MG: 10 INJECTION, SOLUTION INTRAMUSCULAR; INTRAVENOUS at 15:11

## 2023-11-11 RX ADMIN — METOPROLOL TARTRATE 25 MG: 25 TABLET, FILM COATED ORAL at 21:08

## 2023-11-11 RX ADMIN — SERTRALINE 75 MG: 25 TABLET, FILM COATED ORAL at 08:31

## 2023-11-11 RX ADMIN — QUETIAPINE FUMARATE 50 MG: 25 TABLET ORAL at 21:08

## 2023-11-11 RX ADMIN — LEVALBUTEROL HYDROCHLORIDE 1.25 MG: 1.25 SOLUTION RESPIRATORY (INHALATION) at 02:43

## 2023-11-11 RX ADMIN — CHLORHEXIDINE GLUCONATE 15 ML: 1.2 SOLUTION ORAL at 21:08

## 2023-11-11 RX ADMIN — FOLIC ACID 1 MG: 1 TABLET ORAL at 08:31

## 2023-11-11 RX ADMIN — OXYCODONE HYDROCHLORIDE 2.5 MG: 5 SOLUTION ORAL at 08:32

## 2023-11-11 RX ADMIN — ENOXAPARIN SODIUM 40 MG: 40 INJECTION SUBCUTANEOUS at 08:31

## 2023-11-11 RX ADMIN — SODIUM CHLORIDE SOLN NEBU 3% 4 ML: 3 NEBU SOLN at 07:46

## 2023-11-11 RX ADMIN — GABAPENTIN 300 MG: 300 CAPSULE ORAL at 15:10

## 2023-11-11 RX ADMIN — LEVALBUTEROL HYDROCHLORIDE 1.25 MG: 1.25 SOLUTION RESPIRATORY (INHALATION) at 19:32

## 2023-11-11 RX ADMIN — FAMOTIDINE 20 MG: 20 TABLET, FILM COATED ORAL at 08:31

## 2023-11-11 RX ADMIN — BUSPIRONE HYDROCHLORIDE 30 MG: 10 TABLET ORAL at 15:10

## 2023-11-11 RX ADMIN — FLUCONAZOLE 400 MG: 100 TABLET ORAL at 08:31

## 2023-11-11 RX ADMIN — OXYCODONE HYDROCHLORIDE 5 MG: 5 SOLUTION ORAL at 04:49

## 2023-11-11 RX ADMIN — GABAPENTIN 300 MG: 300 CAPSULE ORAL at 08:31

## 2023-11-11 RX ADMIN — LEVALBUTEROL HYDROCHLORIDE 1.25 MG: 1.25 SOLUTION RESPIRATORY (INHALATION) at 15:41

## 2023-11-11 RX ADMIN — OXYCODONE HYDROCHLORIDE 5 MG: 5 SOLUTION ORAL at 21:07

## 2023-11-11 RX ADMIN — LEVALBUTEROL HYDROCHLORIDE 1.25 MG: 1.25 SOLUTION RESPIRATORY (INHALATION) at 07:46

## 2023-11-11 RX ADMIN — SODIUM CHLORIDE SOLN NEBU 3% 4 ML: 3 NEBU SOLN at 19:32

## 2023-11-11 RX ADMIN — BUSPIRONE HYDROCHLORIDE 30 MG: 10 TABLET ORAL at 21:08

## 2023-11-11 RX ADMIN — IPRATROPIUM BROMIDE 0.5 MG: 0.5 SOLUTION RESPIRATORY (INHALATION) at 15:41

## 2023-11-11 RX ADMIN — BUDESONIDE 0.5 MG: 0.5 INHALANT ORAL at 07:46

## 2023-11-11 RX ADMIN — MELATONIN 3 MG: at 21:08

## 2023-11-11 RX ADMIN — ACYCLOVIR 400 MG: 200 CAPSULE ORAL at 08:32

## 2023-11-11 RX ADMIN — GABAPENTIN 300 MG: 300 CAPSULE ORAL at 21:08

## 2023-11-11 RX ADMIN — IPRATROPIUM BROMIDE 0.5 MG: 0.5 SOLUTION RESPIRATORY (INHALATION) at 19:32

## 2023-11-11 RX ADMIN — METOPROLOL TARTRATE 25 MG: 25 TABLET, FILM COATED ORAL at 08:34

## 2023-11-11 RX ADMIN — AMLODIPINE BESYLATE 10 MG: 5 TABLET ORAL at 08:31

## 2023-11-11 RX ADMIN — IPRATROPIUM BROMIDE 0.5 MG: 0.5 SOLUTION RESPIRATORY (INHALATION) at 07:46

## 2023-11-11 RX ADMIN — FUROSEMIDE 40 MG: 10 INJECTION, SOLUTION INTRAMUSCULAR; INTRAVENOUS at 08:31

## 2023-11-11 RX ADMIN — FORMOTEROL FUMARATE DIHYDRATE 20 MCG: 20 SOLUTION RESPIRATORY (INHALATION) at 19:32

## 2023-11-11 RX ADMIN — IPRATROPIUM BROMIDE 0.5 MG: 0.5 SOLUTION RESPIRATORY (INHALATION) at 02:43

## 2023-11-11 RX ADMIN — BUSPIRONE HYDROCHLORIDE 30 MG: 10 TABLET ORAL at 08:42

## 2023-11-11 NOTE — PLAN OF CARE
Problem: MOBILITY - ADULT  Goal: Maintain or return to baseline ADL function  Description: INTERVENTIONS:  -  Assess patient's ability to carry out ADLs; assess patient's baseline for ADL function and identify physical deficits which impact ability to perform ADLs (bathing, care of mouth/teeth, toileting, grooming, dressing, etc.)  - Assess/evaluate cause of self-care deficits   - Assess range of motion  - Assess patient's mobility; develop plan if impaired  - Assess patient's need for assistive devices and provide as appropriate  - Encourage maximum independence but intervene and supervise when necessary  - Involve family in performance of ADLs  - Assess for home care needs following discharge   - Consider OT consult to assist with ADL evaluation and planning for discharge  - Provide patient education as appropriate  Outcome: Not Progressing  Goal: Maintains/Returns to pre admission functional level  Description: INTERVENTIONS:  - Perform BMAT or MOVE assessment daily.   - Set and communicate daily mobility goal to care team and patient/family/caregiver. - Collaborate with rehabilitation services on mobility goals if consulted  - Perform Range of Motion  times a day. - Reposition patient every 2 hours.   - Dangle patient  times a day  - Stand patient  times a day  - Ambulate patient  times a day  - Out of bed to chair  times a day   - Out of bed for meals  times a day  - Out of bed for toileting  - Record patient progress and toleration of activity level   Outcome: Not Progressing     Problem: Prexisting or High Potential for Compromised Skin Integrity  Goal: Skin integrity is maintained or improved  Description: INTERVENTIONS:  - Identify patients at risk for skin breakdown  - Assess and monitor skin integrity  - Assess and monitor nutrition and hydration status  - Monitor labs   - Assess for incontinence   - Turn and reposition patient  - Assist with mobility/ambulation  - Relieve pressure over bony prominences  - Avoid friction and shearing  - Provide appropriate hygiene as needed including keeping skin clean and dry  - Evaluate need for skin moisturizer/barrier cream  - Collaborate with interdisciplinary team   - Patient/family teaching  - Consider wound care consult   Outcome: Not Progressing     Problem: Nutrition/Hydration-ADULT  Goal: Nutrient/Hydration intake appropriate for improving, restoring or maintaining nutritional needs  Description: Monitor and assess patient's nutrition/hydration status for malnutrition. Collaborate with interdisciplinary team and initiate plan and interventions as ordered. Monitor patient's weight and dietary intake as ordered or per policy. Utilize nutrition screening tool and intervene as necessary. Determine patient's food preferences and provide high-protein, high-caloric foods as appropriate.      INTERVENTIONS:  - Monitor oral intake, urinary output, labs, and treatment plans  - Assess nutrition and hydration status and recommend course of action  - Evaluate amount of meals eaten  - Assist patient with eating if necessary   - Allow adequate time for meals  - Recommend/ encourage appropriate diets, oral nutritional supplements, and vitamin/mineral supplements  - Order, calculate, and assess calorie counts as needed  - Recommend, monitor, and adjust tube feedings and TPN/PPN based on assessed needs  - Assess need for intravenous fluids  - Provide specific nutrition/hydration education as appropriate  - Include patient/family/caregiver in decisions related to nutrition  Outcome: Not Progressing     Problem: PAIN - ADULT  Goal: Verbalizes/displays adequate comfort level or baseline comfort level  Description: Interventions:  - Encourage patient to monitor pain and request assistance  - Assess pain using appropriate pain scale  - Administer analgesics based on type and severity of pain and evaluate response  - Implement non-pharmacological measures as appropriate and evaluate response  - Consider cultural and social influences on pain and pain management  - Notify physician/advanced practitioner if interventions unsuccessful or patient reports new pain  Outcome: Not Progressing     Problem: INFECTION - ADULT  Goal: Absence or prevention of progression during hospitalization  Description: INTERVENTIONS:  - Assess and monitor for signs and symptoms of infection  - Monitor lab/diagnostic results  - Monitor all insertion sites, i.e. indwelling lines, tubes, and drains  - Monitor endotracheal if appropriate and nasal secretions for changes in amount and color  - [place] appropriate cooling/warming therapies per order  - Administer medications as ordered  - Instruct and encourage patient and family to use good hand hygiene technique  - Identify and instruct in appropriate isolation precautions for identified infection/condition  Outcome: Not Progressing  Goal: Absence of fever/infection during neutropenic period  Description: INTERVENTIONS:  - Monitor WBC    Outcome: Not Progressing     Problem: SAFETY ADULT  Goal: Maintain or return to baseline ADL function  Description: INTERVENTIONS:  -  Assess patient's ability to carry out ADLs; assess patient's baseline for ADL function and identify physical deficits which impact ability to perform ADLs (bathing, care of mouth/teeth, toileting, grooming, dressing, etc.)  - Assess/evaluate cause of self-care deficits   - Assess range of motion  - Assess patient's mobility; develop plan if impaired  - Assess patient's need for assistive devices and provide as appropriate  - Encourage maximum independence but intervene and supervise when necessary  - Involve family in performance of ADLs  - Assess for home care needs following discharge   - Consider OT consult to assist with ADL evaluation and planning for discharge  - Provide patient education as appropriate  Outcome: Not Progressing  Goal: Maintains/Returns to pre admission functional level  Description: INTERVENTIONS:  - Perform BMAT or MOVE assessment daily.   - Set and communicate daily mobility goal to care team and patient/family/caregiver. - Collaborate with rehabilitation services on mobility goals if consulted  - Perform Range of Motion  times a day. - Reposition patient every 2 hours.   - Dangle patient  times a day  - Stand patient  times a day  - Ambulate patient  times a day  - Out of bed to chair  times a day   - Out of bed for meals  times a day  - Out of bed for toileting  - Record patient progress and toleration of activity level   Outcome: Not Progressing  Goal: Patient will remain free of falls  Description: INTERVENTIONS:  - Educate patient/family on patient safety including physical limitations  - Instruct patient to call for assistance with activity   - Consult OT/PT to assist with strengthening/mobility   - Keep Call bell within reach  - Keep bed low and locked with side rails adjusted as appropriate  - Keep care items and personal belongings within reach  - Initiate and maintain comfort rounds  - Make Fall Risk Sign visible to staff  - Offer Toileting every 2 Hours, in advance of need  - Initiate/Maintain bed alarm  - Obtain necessary fall risk management equipment:   - Apply yellow socks and bracelet for high fall risk patients  - Consider moving patient to room near nurses station  Outcome: Not Progressing     Problem: DISCHARGE PLANNING  Goal: Discharge to home or other facility with appropriate resources  Description: INTERVENTIONS:  - Identify barriers to discharge w/patient and caregiver  - Arrange for needed discharge resources and transportation as appropriate  - Identify discharge learning needs (meds, wound care, etc.)  - Arrange for interpretive services to assist at discharge as needed  - Refer to Case Management Department for coordinating discharge planning if the patient needs post-hospital services based on physician/advanced practitioner order or complex needs related to functional status, cognitive ability, or social support system  Outcome: Not Progressing     Problem: Knowledge Deficit  Goal: Patient/family/caregiver demonstrates understanding of disease process, treatment plan, medications, and discharge instructions  Description: Complete learning assessment and assess knowledge base.   Interventions:  - Provide teaching at level of understanding  - Provide teaching via preferred learning methods  Outcome: Not Progressing     Problem: RESPIRATORY - ADULT  Goal: Achieves optimal ventilation and oxygenation  Description: INTERVENTIONS:  - Assess for changes in respiratory status  - Assess for changes in mentation and behavior  - Position to facilitate oxygenation and minimize respiratory effort  - Oxygen administered by appropriate delivery if ordered  - Initiate smoking cessation education as indicated  - Encourage broncho-pulmonary hygiene including cough, deep breathe, Incentive Spirometry  - Assess the need for suctioning and aspirate as needed  - Assess and instruct to report SOB or any respiratory difficulty  - Respiratory Therapy support as indicated  Outcome: Not Progressing     Problem: GENITOURINARY - ADULT  Goal: Maintains or returns to baseline urinary function  Description: INTERVENTIONS:  - Assess urinary function  - Encourage oral fluids to ensure adequate hydration if ordered  - Administer IV fluids as ordered to ensure adequate hydration  - Administer ordered medications as needed  - Offer frequent toileting  - Follow urinary retention protocol if ordered  Outcome: Not Progressing  Goal: Absence of urinary retention  Description: INTERVENTIONS:  - Assess patient’s ability to void and empty bladder  - Monitor I/O  - Bladder scan as needed  - Discuss with physician/AP medications to alleviate retention as needed  - Discuss catheterization for long term situations as appropriate  Outcome: Not Progressing     Problem: METABOLIC, FLUID AND ELECTROLYTES - ADULT  Goal: Electrolytes maintained within normal limits  Description: INTERVENTIONS:  - Monitor labs and assess patient for signs and symptoms of electrolyte imbalances  - Administer electrolyte replacement as ordered  - Monitor response to electrolyte replacements, including repeat lab results as appropriate  - Instruct patient on fluid and nutrition as appropriate  Outcome: Not Progressing  Goal: Fluid balance maintained  Description: INTERVENTIONS:  - Monitor labs   - Monitor I/O and WT  - Instruct patient on fluid and nutrition as appropriate  - Assess for signs & symptoms of volume excess or deficit  Outcome: Not Progressing  Goal: Glucose maintained within target range  Description: INTERVENTIONS:  - Monitor Blood Glucose as ordered  - Assess for signs and symptoms of hyperglycemia and hypoglycemia  - Administer ordered medications to maintain glucose within target range  - Assess nutritional intake and initiate nutrition service referral as needed  Outcome: Not Progressing

## 2023-11-11 NOTE — PROGRESS NOTES
2230. Bed alarm alarming patient found restless in bed and lethargic and not responding to verbal cues. Oxygen levels falling to 50% with perfect signal. Respiratory and icu team called. Patient becoming more lethargic. Tracheal suctioned for copious amounts of thick tan/yellow secretions. Oxygen also turned up to 100% via trach collar. Dr Daphne Avitia ay bedside to assess patient. Patient more responsive but remains lethargic. Chest x-ray ordered. Blood pressures dropping from 707 to 72 systolically. See epic flow sheets. EKG obtained and new orders received for fluid bolus received and noted. blood pressures responded to fluid bolus and returned to normal li its.

## 2023-11-11 NOTE — PROGRESS NOTES
[id number]  Progress Note: Critical Care  Name: Ariana Duffy 77 y.o. female I MRN: [id number]  Unit/Bed#: ICU 03 I Date of Admission: 9/13/2023   Date of Service: 11/11/2023 I Hospital Day: 61    Assessment/Plan   Active problems:   Acute respiratory failure with hypoxia secondary to oropharyngeal mass  Active Problems:    Large cell lymphoma (HCC)    Oropharyngeal mass    Blister (nonthermal) of left forearm, sequela    Generalized abdominal pain    (HFpEF) heart failure with preserved ejection fraction (HCC)    Ambulatory dysfunction    Benign essential hypertension    Pain syndrome, chronic    CKD (chronic kidney disease) stage 3, GFR 30-59 ml/min (HCC)    Chronic Superficial thrombophlebitis    Depression    Neuro:   No active issues, Aox4  Continue Seroquel, Buspar and Zoloft    CV:   Hemodynamically stable at this time, continue antihypertensives. Overnight had decrease in BP, still within MAP goal >65. Responded w/ fluid bolus. EKG w/o ischemic changes     Pulm:  S/p trach (cuffed Shiley XLT #7 ) 2ary to oropharyngeal mass/smoker  Concern remains for ongoing hypoxia due to mucous plugging   Continues to require trach collar/HFNC 50%/50L, goal O2 88-92%  Q4 suctioning for secretions, saline nebs, xoponex, performist and atrovent    GI:   Refusing TF via PEG tube, diet per speech (dysphagia 2)  GI ppx: famotodine    :   Cr 1.04 , BUN 44   Continue lasix 40mg  Strict q2h I/O monitoring  Continue to follow renal function tests    F/E/N:   I/O: 800/950 net: -150  K 3.5 (yest), Mg 3.1 (2 days ago), Phos 4.5  Replete electrolytes with as needed to maintain K >4.0, Mag >2.0, Phos >3.0  Nutrition/Diet Plan: per speech    Heme/Onc:   Hgb: 7.3 (baseline) Plt 360. chemotherapy associated pancytopenia   Large B-cell lymphoma, stage II, Plan for repeat PET-CT on hospital discharge to evaluate disease response. 11/6 completed R-EPOCH cycle 3 and Cytoxan 11/7   Continue folate supp.    VTE prophylaxis:  Pharmacologic Prophylaxis: Lovenox  Mechanical Prophylaxis: sequential compression device    Endo/Rheum:   No active issues   No results found for: "HGBA1C". ID:   WBC 43 yest, however did receive Granix x2 days ago. She remains afebrile. Remaining pending infectious work-up: histoplasma urine antigen, Aspergillus galactomannan antigen, Aspergillus antibodies are negative. No evidence of an infectious process at this time. Continue to monitor off antimicrobials other than what she is receiving for chemotherapy prophylaxis. Prophylaxis:  Bactrim prophylaxis Monday Wednesday Friday,  Acyclovir 400 mg twice daily  Fluconazole 200 mg daily  Levofloxacin 250 mg daily       MSK/Skin:   No active issue. Continue gabapentin. and pain regimen   Reposition q2h, eliminate pressure points while in bed  Close skin surveillance   Local wound care as needed    Disposition: Continue Stepdown Level 1, lab holiday today will repeat labs tomorrow AM as they were not done overnight. ICU Core Measures     Vented Patient  VAP Bundle  VAP bundle ordered     A: Assess, Prevent, and Manage Pain Has pain been assessed? Yes  Need for changes to pain regimen? No   B: Both Spontaneous Awakening Trials (SATs) and Spontaneous Breathing Trials (SBTs) Plan to perform spontaneous awakening trial today? N/A   Plan to perform spontaneous breathing trial today? N/A   Obvious barriers to extubation? NA   C: Choice of Sedation RASS Goal: 0 Alert and Calm or N/A patient not on sedation  Need for changes to sedation or analgesia regimen? NA   D: Delirium CAM-ICU: Negative   E: Early Mobility  Plan for early mobility? NA   F: Family Engagement Plan for family engagement today?  Yes       Antibiotic Review: Infectious disease consulted    Review of Invasive Devices:        Prophylaxis:  VTE VTE covered by:  enoxaparin, Subcutaneous, 40 mg at 11/11/23 0831       Stress Ulcer  covered byfamotidine (PEPCID) tablet 20 mg [[id number]] Subjective   HPI/24hr events: Had destat and decreased BP last night, which quickly recovered with fluid bolus. Review of Systems   Unable to perform ROS: Other   Constitutional:  Negative for chills and fever. Respiratory:  Negative for shortness of breath. Cardiovascular:  Negative for chest pain and leg swelling. Pt refused ROS questioning, state she does not want to be bothered. Objective                            Vitals I/O      Most Recent Min/Max in 24hrs   Temp 98.7 °F (37.1 °C) Temp  Min: 97.5 °F (36.4 °C)  Max: 99.2 °F (37.3 °C)   Pulse 79 Pulse  Min: 79  Max: 112   Resp 15 Resp  Min: 12  Max: 39   BP 98/57 BP  Min: 72/49  Max: 129/68   O2 Sat 99 % SpO2  Min: 76 %  Max: 100 %      Intake/Output Summary (Last 24 hours) at 11/11/2023 1138  Last data filed at 11/11/2023 0905  Gross per 24 hour   Intake 780 ml   Output 1050 ml   Net -270 ml         Diet Dysphagia/Modified Consistency; Dysphagia 2-Mechanical Soft; Honey Thick Liquid     Invasive Monitoring Physical exam    Physical Exam  Eyes:      Extraocular Movements: Extraocular movements intact. Pupils: Pupils are equal, round, and reactive to light. Skin:     General: Skin is warm and dry. HENT:      Head: Normocephalic and atraumatic. Nose: No congestion or rhinorrhea. Mouth/Throat:      Mouth: Mucous membranes are moist.      Pharynx: No oropharyngeal exudate. Neck:      Trachea: Tracheostomy present. Cardiovascular:      Rate and Rhythm: Normal rate. Pulses: Normal pulses. Heart sounds: Normal heart sounds. Musculoskeletal:         General: No swelling. Normal range of motion. Right lower leg: No edema. Left lower leg: No edema. Abdominal:      Palpations: Abdomen is soft. Tenderness: There is no abdominal tenderness. There is no guarding. Constitutional:       Appearance: She is ill-appearing (chronic). Pulmonary:      Effort: Respiratory distress (intermittent mild. on trach collar and HFNC) present. No accessory muscle usage or accessory muscle usage. Psychiatric:         Mood and Affect:  mood and affect abnormal.  Neurological:      General: No focal deficit present. Mental Status: She is alert and oriented to person, place and time. Mental status is at baseline. She is CAM ICU negative. Cranial Nerves: No facial asymmetry. Motor: gross motor function is at baseline for patient. Strength full and intact in all extremities. Vitals and nursing note reviewed. Diagnostic Studies      EKG: No interval EKG. NSR on tele  Imaging: CXR 11/11 Diffuse interstitial edema, but greatly improved from prior. I have personally reviewed pertinent reports.        Medications:  Scheduled PRN   acyclovir, 400 mg, BID  amLODIPine, 10 mg, Daily  budesonide, 0.5 mg, Q12H  busPIRone, 30 mg, TID  chlorhexidine, 15 mL, Q12H ASHLEY  enoxaparin, 40 mg, Q24H ASHLEY  famotidine, 20 mg, BID  fluconazole, 750 mg, Daily  folic acid, 1 mg, Daily  formoterol, 20 mcg, Q12H  furosemide, 40 mg, BID (diuretic)  gabapentin, 300 mg, TID  levalbuterol, 1.25 mg, Q6H   And  ipratropium, 0.5 mg, Q6H  melatonin, 3 mg, HS  metoprolol tartrate, 25 mg, Q12H [facility]  nicotine, 7 mg, Daily  oxyCODONE, 5 mg, Q8H  QUEtiapine, 50 mg, HS  senna, 8.8 mg, HS  sertraline, 75 mg, Daily  sodium chloride, 4 mL, TID  sulfamethoxazole-trimethoprim, 1 tablet, Once per day on Mon Wed Fri      alteplase, 2 mg, Q1MIN PRN  alteplase, 2 mg, Q1MIN PRN  alteplase, 2 mg, Q1MIN PRN  ondansetron, 4 mg, Q8H PRN  oxyCODONE, 2.5 mg, Q6H PRN       Continuous          Labs:    CBC    Recent Labs     11/10/23  0502 11/10/23  0915   WBC 41.22* 43.80*   HGB 7.2* 7.3*   HCT 22.2* 22.8*    360   BANDSPCT  --  2     BMP    Recent Labs     11/10/23  0502   SODIUM 143   K 3.5      CO2 31   AGAP 8   BUN 44*   CREATININE 1.04   CALCIUM 9.6       Coags    No recent results     Additional Electrolytes  Recent Labs     11/10/23  7896 11/11/23  0451   PHOS 4.5* 4.5*          Blood Gas    No recent results  No recent results LFTs  No recent results    Infectious  No recent results  Glucose  Recent Labs     11/10/23  0502   GLUC 105               Nestor Cole, DO

## 2023-11-12 LAB
ABO GROUP BLD: NORMAL
ANION GAP SERPL CALCULATED.3IONS-SCNC: 6 MMOL/L
BACTERIA SPT RESP CULT: ABNORMAL
BASOPHILS # BLD AUTO: 0 THOUSANDS/ÂΜL (ref 0–0.1)
BASOPHILS NFR BLD AUTO: 0 % (ref 0–1)
BLD GP AB SCN SERPL QL: NEGATIVE
BUN SERPL-MCNC: 42 MG/DL (ref 5–25)
CA-I BLD-SCNC: 1.25 MMOL/L (ref 1.12–1.32)
CALCIUM SERPL-MCNC: 9.5 MG/DL (ref 8.4–10.2)
CHLORIDE SERPL-SCNC: 107 MMOL/L (ref 96–108)
CO2 SERPL-SCNC: 32 MMOL/L (ref 21–32)
CREAT SERPL-MCNC: 1.24 MG/DL (ref 0.6–1.3)
EOSINOPHIL # BLD AUTO: 0.06 THOUSAND/ÂΜL (ref 0–0.61)
EOSINOPHIL NFR BLD AUTO: 3 % (ref 0–6)
ERYTHROCYTE [DISTWIDTH] IN BLOOD BY AUTOMATED COUNT: 16.2 % (ref 11.6–15.1)
GFR SERPL CREATININE-BSD FRML MDRD: 44 ML/MIN/1.73SQ M
GLUCOSE SERPL-MCNC: 105 MG/DL (ref 65–140)
GRAM STN SPEC: ABNORMAL
HCT VFR BLD AUTO: 19.4 % (ref 34.8–46.1)
HCT VFR BLD AUTO: 24.3 % (ref 34.8–46.1)
HGB BLD-MCNC: 6 G/DL (ref 11.5–15.4)
HGB BLD-MCNC: 7.6 G/DL (ref 11.5–15.4)
IMM GRANULOCYTES # BLD AUTO: 0.14 THOUSAND/UL (ref 0–0.2)
IMM GRANULOCYTES NFR BLD AUTO: 6 % (ref 0–2)
LYMPHOCYTES # BLD AUTO: 0.47 THOUSANDS/ÂΜL (ref 0.6–4.47)
LYMPHOCYTES NFR BLD AUTO: 21 % (ref 14–44)
MAGNESIUM SERPL-MCNC: 1.9 MG/DL (ref 1.9–2.7)
MCH RBC QN AUTO: 30.8 PG (ref 26.8–34.3)
MCHC RBC AUTO-ENTMCNC: 30.9 G/DL (ref 31.4–37.4)
MCV RBC AUTO: 100 FL (ref 82–98)
MONOCYTES # BLD AUTO: 0.03 THOUSAND/ÂΜL (ref 0.17–1.22)
MONOCYTES NFR BLD AUTO: 1 % (ref 4–12)
NEUTROPHILS # BLD AUTO: 1.5 THOUSANDS/ÂΜL (ref 1.85–7.62)
NEUTS SEG NFR BLD AUTO: 69 % (ref 43–75)
NRBC BLD AUTO-RTO: 0 /100 WBCS
PHOSPHATE SERPL-MCNC: 3.9 MG/DL (ref 2.3–4.1)
PLATELET # BLD AUTO: 222 THOUSANDS/UL (ref 149–390)
PMV BLD AUTO: 9 FL (ref 8.9–12.7)
POTASSIUM SERPL-SCNC: 3.5 MMOL/L (ref 3.5–5.3)
RBC # BLD AUTO: 1.95 MILLION/UL (ref 3.81–5.12)
RH BLD: POSITIVE
SODIUM SERPL-SCNC: 145 MMOL/L (ref 135–147)
SPECIMEN EXPIRATION DATE: NORMAL
WBC # BLD AUTO: 2.2 THOUSAND/UL (ref 4.31–10.16)

## 2023-11-12 PROCEDURE — 94760 N-INVAS EAR/PLS OXIMETRY 1: CPT

## 2023-11-12 PROCEDURE — 80048 BASIC METABOLIC PNL TOTAL CA: CPT

## 2023-11-12 PROCEDURE — 84100 ASSAY OF PHOSPHORUS: CPT | Performed by: INTERNAL MEDICINE

## 2023-11-12 PROCEDURE — P9040 RBC LEUKOREDUCED IRRADIATED: HCPCS

## 2023-11-12 PROCEDURE — 85027 COMPLETE CBC AUTOMATED: CPT

## 2023-11-12 PROCEDURE — 86850 RBC ANTIBODY SCREEN: CPT

## 2023-11-12 PROCEDURE — 86901 BLOOD TYPING SEROLOGIC RH(D): CPT

## 2023-11-12 PROCEDURE — 94640 AIRWAY INHALATION TREATMENT: CPT

## 2023-11-12 PROCEDURE — 83735 ASSAY OF MAGNESIUM: CPT

## 2023-11-12 PROCEDURE — 82330 ASSAY OF CALCIUM: CPT

## 2023-11-12 PROCEDURE — 86900 BLOOD TYPING SEROLOGIC ABO: CPT

## 2023-11-12 PROCEDURE — 86923 COMPATIBILITY TEST ELECTRIC: CPT

## 2023-11-12 PROCEDURE — L8501 TRACHEOSTOMY SPEAKING VALVE: HCPCS

## 2023-11-12 PROCEDURE — 85018 HEMOGLOBIN: CPT

## 2023-11-12 PROCEDURE — 85014 HEMATOCRIT: CPT

## 2023-11-12 PROCEDURE — 99291 CRITICAL CARE FIRST HOUR: CPT | Performed by: INTERNAL MEDICINE

## 2023-11-12 RX ORDER — POTASSIUM CHLORIDE 20MEQ/15ML
20 LIQUID (ML) ORAL ONCE
Status: COMPLETED | OUTPATIENT
Start: 2023-11-12 | End: 2023-11-12

## 2023-11-12 RX ORDER — POTASSIUM CHLORIDE 20 MEQ/1
20 TABLET, EXTENDED RELEASE ORAL ONCE
Status: DISCONTINUED | OUTPATIENT
Start: 2023-11-12 | End: 2023-11-12

## 2023-11-12 RX ADMIN — LEVALBUTEROL HYDROCHLORIDE 1.25 MG: 1.25 SOLUTION RESPIRATORY (INHALATION) at 01:00

## 2023-11-12 RX ADMIN — FORMOTEROL FUMARATE DIHYDRATE 20 MCG: 20 SOLUTION RESPIRATORY (INHALATION) at 20:07

## 2023-11-12 RX ADMIN — IPRATROPIUM BROMIDE 0.5 MG: 0.5 SOLUTION RESPIRATORY (INHALATION) at 14:26

## 2023-11-12 RX ADMIN — ACYCLOVIR 400 MG: 200 CAPSULE ORAL at 18:02

## 2023-11-12 RX ADMIN — IPRATROPIUM BROMIDE 0.5 MG: 0.5 SOLUTION RESPIRATORY (INHALATION) at 07:45

## 2023-11-12 RX ADMIN — FUROSEMIDE 40 MG: 10 INJECTION, SOLUTION INTRAMUSCULAR; INTRAVENOUS at 15:05

## 2023-11-12 RX ADMIN — OXYCODONE HYDROCHLORIDE 5 MG: 5 SOLUTION ORAL at 12:52

## 2023-11-12 RX ADMIN — IPRATROPIUM BROMIDE 0.5 MG: 0.5 SOLUTION RESPIRATORY (INHALATION) at 20:07

## 2023-11-12 RX ADMIN — SODIUM CHLORIDE SOLN NEBU 3% 4 ML: 3 NEBU SOLN at 14:26

## 2023-11-12 RX ADMIN — FOLIC ACID 1 MG: 1 TABLET ORAL at 08:20

## 2023-11-12 RX ADMIN — POTASSIUM CHLORIDE 20 MEQ: 1.5 SOLUTION ORAL at 10:34

## 2023-11-12 RX ADMIN — BUSPIRONE HYDROCHLORIDE 30 MG: 10 TABLET ORAL at 21:28

## 2023-11-12 RX ADMIN — OXYCODONE HYDROCHLORIDE 5 MG: 5 SOLUTION ORAL at 04:50

## 2023-11-12 RX ADMIN — LEVALBUTEROL HYDROCHLORIDE 1.25 MG: 1.25 SOLUTION RESPIRATORY (INHALATION) at 20:07

## 2023-11-12 RX ADMIN — MELATONIN 3 MG: at 21:30

## 2023-11-12 RX ADMIN — IPRATROPIUM BROMIDE 0.5 MG: 0.5 SOLUTION RESPIRATORY (INHALATION) at 01:00

## 2023-11-12 RX ADMIN — SODIUM CHLORIDE SOLN NEBU 3% 4 ML: 3 NEBU SOLN at 20:07

## 2023-11-12 RX ADMIN — SODIUM CHLORIDE SOLN NEBU 3% 4 ML: 3 NEBU SOLN at 07:45

## 2023-11-12 RX ADMIN — GABAPENTIN 300 MG: 300 CAPSULE ORAL at 21:28

## 2023-11-12 RX ADMIN — ENOXAPARIN SODIUM 40 MG: 40 INJECTION SUBCUTANEOUS at 08:19

## 2023-11-12 RX ADMIN — FAMOTIDINE 20 MG: 20 TABLET, FILM COATED ORAL at 18:04

## 2023-11-12 RX ADMIN — FUROSEMIDE 40 MG: 10 INJECTION, SOLUTION INTRAMUSCULAR; INTRAVENOUS at 08:20

## 2023-11-12 RX ADMIN — BUDESONIDE 0.5 MG: 0.5 INHALANT ORAL at 20:07

## 2023-11-12 RX ADMIN — SERTRALINE 75 MG: 25 TABLET, FILM COATED ORAL at 08:20

## 2023-11-12 RX ADMIN — BUDESONIDE 0.5 MG: 0.5 INHALANT ORAL at 07:46

## 2023-11-12 RX ADMIN — AMLODIPINE BESYLATE 10 MG: 5 TABLET ORAL at 08:20

## 2023-11-12 RX ADMIN — GABAPENTIN 300 MG: 300 CAPSULE ORAL at 08:20

## 2023-11-12 RX ADMIN — NICOTINE 7 MG: 7 PATCH, EXTENDED RELEASE TRANSDERMAL at 08:21

## 2023-11-12 RX ADMIN — ACYCLOVIR 400 MG: 200 CAPSULE ORAL at 08:20

## 2023-11-12 RX ADMIN — BUSPIRONE HYDROCHLORIDE 30 MG: 10 TABLET ORAL at 08:20

## 2023-11-12 RX ADMIN — FAMOTIDINE 20 MG: 20 TABLET, FILM COATED ORAL at 08:20

## 2023-11-12 RX ADMIN — CHLORHEXIDINE GLUCONATE 15 ML: 1.2 SOLUTION ORAL at 08:19

## 2023-11-12 RX ADMIN — CHLORHEXIDINE GLUCONATE 15 ML: 1.2 SOLUTION ORAL at 21:28

## 2023-11-12 RX ADMIN — QUETIAPINE FUMARATE 50 MG: 25 TABLET ORAL at 21:28

## 2023-11-12 RX ADMIN — BUSPIRONE HYDROCHLORIDE 30 MG: 10 TABLET ORAL at 15:05

## 2023-11-12 RX ADMIN — OXYCODONE HYDROCHLORIDE 2.5 MG: 5 SOLUTION ORAL at 10:25

## 2023-11-12 RX ADMIN — FORMOTEROL FUMARATE DIHYDRATE 20 MCG: 20 SOLUTION RESPIRATORY (INHALATION) at 07:46

## 2023-11-12 RX ADMIN — FLUCONAZOLE 400 MG: 100 TABLET ORAL at 08:20

## 2023-11-12 RX ADMIN — METOPROLOL TARTRATE 25 MG: 25 TABLET, FILM COATED ORAL at 08:20

## 2023-11-12 RX ADMIN — OXYCODONE HYDROCHLORIDE 5 MG: 5 SOLUTION ORAL at 21:28

## 2023-11-12 RX ADMIN — GABAPENTIN 300 MG: 300 CAPSULE ORAL at 15:05

## 2023-11-12 RX ADMIN — LEVALBUTEROL HYDROCHLORIDE 1.25 MG: 1.25 SOLUTION RESPIRATORY (INHALATION) at 14:26

## 2023-11-12 RX ADMIN — LEVALBUTEROL HYDROCHLORIDE 1.25 MG: 1.25 SOLUTION RESPIRATORY (INHALATION) at 07:45

## 2023-11-12 NOTE — ASSESSMENT & PLAN NOTE
9/14 Neck/ soft tissue CT: Large enhancing soft tissue mass identified within the left neck involving multiple spaces. Centered within the left oropharynx with extensions as described above into multiple spaces. This results in significant airway compromise. 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 Small level 3/level 4 nodes are seen within the neck. Tracheostomy by ENT. Replaced on 9/26. H/o tobacco abuse, daily smoker. On nicotine while inpatient, confirmed with patient she needs nicotine patch. CT soft tissue neck shows reduced mass effect from 9/14 to 10/13. Can consider reducing trach size if continues to improve. Patient often refusing peg tube feeds but is feeding herself small meals orally. Plan:  ENT and heme onc following  As per speech therapist diet  Dysphagia 2. Continue parallel PEG tube feeds for nutrition. See acute respiratory failure and large B cell lymphoma above.

## 2023-11-12 NOTE — ASSESSMENT & PLAN NOTE
Large B-cell lymphoma, stage II. First chemo cycle 9/22 4 days of R-EPOCH. 9/27 Rituxan. 9/28 Filgrastim. Received nivestym 300 mc 7 days dcd on 10/26      Prophylaxis:  Bactrim prophylaxis Monday Wednesday Friday,  Acyclovir 400 mg twice daily  Fluconazole 200 mg daily  Levofloxacin 250 mg daily  Lovenox 40 mg daily    9/22  4 days of R-EPOCH.  9/27 Rituxan  10/13 for [facility] cycle 2, which was done over 4 days and cytoxan on 10/19.  11/6 completed R-EPOCH cycle 3 and Cytoxan 11/7. Plan:  Oncology is following. Plan for repeat PET-CT on hospital discharge to evaluate disease response. Transfuse blood products as needed.   Keep Hgb>7 and Plt>20 for chemo   See acute respiratory failure above

## 2023-11-12 NOTE — ASSESSMENT & PLAN NOTE
Lab Results   Component Value Date    EGFR 44 11/12/2023    EGFR 54 11/10/2023    EGFR 59 11/09/2023    CREATININE 1.24 11/12/2023    CREATININE 1.04 11/10/2023    CREATININE 0.97 11/09/2023     Baseline Cr between 0.75-1.1  Initial HERIBERTO felt 2/2 prerenal azotemia 2/2 diuresis, reduced PO intake +/- ATN. Patient received 2 doses of Bumex IV 2 g as she had not been responding appropriately to IV 40 Lasix daily. Creatinine went up by 0.5 meeting criteria for an HERIBERTO. This may be prerenal intrinsic or postrenal.  Potential prerenal causes include reduced kidney perfusion due to lisinopril. Intrinsic can also be lisinopril due to ATN, AIN from chemo medications, TLS, crystaline nephropathy from acyclovir, rituxan nephrotoxicity, filgrastim glomerulonephritis. Post renal obstructive could be from urine retention from vincristine or cyclophosphamide bladder fibrosis. Suspect most likely due to medications as a combination of prerenal and intrinsic. FENa was 0.2%, UA shows no casts or signs of infection, urine eosinophils 0%. Patient likely has prerenal HERIBERTO from volume depletion. Received 2 L NS and 1 pRBC and cr went up by 0.07 still and Na and Cl went down, suspect that volume prevented further cr rise and free water excretion impaired by kidney function, allowing hyponatremia to increase. Creatinine increase is likely plateaued and went down the following day. Suspect that now that nephrotoxic medications have been stopped she responded well to Lasix and creatinine downtrended. HERIBERTO now resolved. Will be cautious about nephrotoxins and monitor as restarting EPOCH and ppx. Patient gets volume depleted and overloaded very easily. Especially from chemo. 10/19 Heriberto as cr went up by 0.3 in 48 hours from 0.82 on 10/17 to 1.22 on 10/19. Suspect this is prerenal hypovolemic, will see if patient improves with fluids. She gets overloaded easily so if no improvement suspect CRS again. 10/29 HERIBERTO cr went up by 0.3 again.  She has 922 ml UOP in last 24 hours. PO likely from lasix 40mg IV given yesterday. She is not hypovolemic. Plan:   Continue to monitor UO/renal function. Avoid nephrotoxic agents  Continue to monitor a.m. BMP.

## 2023-11-12 NOTE — PLAN OF CARE
Problem: MOBILITY - ADULT  Goal: Maintain or return to baseline ADL function  Description: INTERVENTIONS:  -  Assess patient's ability to carry out ADLs; assess patient's baseline for ADL function and identify physical deficits which impact ability to perform ADLs (bathing, care of mouth/teeth, toileting, grooming, dressing, etc.)  - Assess/evaluate cause of self-care deficits   - Assess range of motion  - Assess patient's mobility; develop plan if impaired  - Assess patient's need for assistive devices and provide as appropriate  - Encourage maximum independence but intervene and supervise when necessary  - Involve family in performance of ADLs  - Assess for home care needs following discharge   - Consider OT consult to assist with ADL evaluation and planning for discharge  - Provide patient education as appropriate  Outcome: Progressing  Goal: Maintains/Returns to pre admission functional level  Description: INTERVENTIONS:  - Perform BMAT or MOVE assessment daily.   - Set and communicate daily mobility goal to care team and patient/family/caregiver. - Collaborate with rehabilitation services on mobility goals if consulted  - Perform Range of Motion  times a day. - Reposition patient every  hours.   - Dangle patient  times a day  - Stand patient  times a day  - Ambulate patient  times a day  - Out of bed to chair  times a day   - Out of bed for meals  times a day  - Out of bed for toileting  - Record patient progress and toleration of activity level   Outcome: Progressing     Problem: Prexisting or High Potential for Compromised Skin Integrity  Goal: Skin integrity is maintained or improved  Description: INTERVENTIONS:  - Identify patients at risk for skin breakdown  - Assess and monitor skin integrity  - Assess and monitor nutrition and hydration status  - Monitor labs   - Assess for incontinence   - Turn and reposition patient  - Assist with mobility/ambulation  - Relieve pressure over bony prominences  - Avoid friction and shearing  - Provide appropriate hygiene as needed including keeping skin clean and dry  - Evaluate need for skin moisturizer/barrier cream  - Collaborate with interdisciplinary team   - Patient/family teaching  - Consider wound care consult   Outcome: Progressing     Problem: Nutrition/Hydration-ADULT  Goal: Nutrient/Hydration intake appropriate for improving, restoring or maintaining nutritional needs  Description: Monitor and assess patient's nutrition/hydration status for malnutrition. Collaborate with interdisciplinary team and initiate plan and interventions as ordered. Monitor patient's weight and dietary intake as ordered or per policy. Utilize nutrition screening tool and intervene as necessary. Determine patient's food preferences and provide high-protein, high-caloric foods as appropriate.      INTERVENTIONS:  - Monitor oral intake, urinary output, labs, and treatment plans  - Assess nutrition and hydration status and recommend course of action  - Evaluate amount of meals eaten  - Assist patient with eating if necessary   - Allow adequate time for meals  - Recommend/ encourage appropriate diets, oral nutritional supplements, and vitamin/mineral supplements  - Order, calculate, and assess calorie counts as needed  - Recommend, monitor, and adjust tube feedings and TPN/PPN based on assessed needs  - Assess need for intravenous fluids  - Provide specific nutrition/hydration education as appropriate  - Include patient/family/caregiver in decisions related to nutrition  Outcome: Progressing     Problem: PAIN - ADULT  Goal: Verbalizes/displays adequate comfort level or baseline comfort level  Description: Interventions:  - Encourage patient to monitor pain and request assistance  - Assess pain using appropriate pain scale  - Administer analgesics based on type and severity of pain and evaluate response  - Implement non-pharmacological measures as appropriate and evaluate response  - Consider cultural and social influences on pain and pain management  - Notify physician/advanced practitioner if interventions unsuccessful or patient reports new pain  Outcome: Progressing     Problem: INFECTION - ADULT  Goal: Absence or prevention of progression during hospitalization  Description: INTERVENTIONS:  - Assess and monitor for signs and symptoms of infection  - Monitor lab/diagnostic results  - Monitor all insertion sites, i.e. indwelling lines, tubes, and drains  - Monitor endotracheal if appropriate and nasal secretions for changes in amount and color  - Rum[place] appropriate cooling/warming therapies per order  - Administer medications as ordered  - Instruct and encourage patient and family to use good hand hygiene technique  - Identify and instruct in appropriate isolation precautions for identified infection/condition  Outcome: Progressing  Goal: Absence of fever/infection during neutropenic period  Description: INTERVENTIONS:  - Monitor WBC    Outcome: Progressing     Problem: SAFETY ADULT  Goal: Maintain or return to baseline ADL function  Description: INTERVENTIONS:  -  Assess patient's ability to carry out ADLs; assess patient's baseline for ADL function and identify physical deficits which impact ability to perform ADLs (bathing, care of mouth/teeth, toileting, grooming, dressing, etc.)  - Assess/evaluate cause of self-care deficits   - Assess range of motion  - Assess patient's mobility; develop plan if impaired  - Assess patient's need for assistive devices and provide as appropriate  - Encourage maximum independence but intervene and supervise when necessary  - Involve family in performance of ADLs  - Assess for home care needs following discharge   - Consider OT consult to assist with ADL evaluation and planning for discharge  - Provide patient education as appropriate  Outcome: Progressing  Goal: Maintains/Returns to pre admission functional level  Description: INTERVENTIONS:  - Perform BMAT or MOVE assessment daily.   - Set and communicate daily mobility goal to care team and patient/family/caregiver. - Collaborate with rehabilitation services on mobility goals if consulted  - Perform Range of Motion  times a day. - Reposition patient every  hours.   - Dangle patient  times a day  - Stand patient  times a day  - Ambulate patient  times a day  - Out of bed to chair  times a day   - Out of bed for meals  times a day  - Out of bed for toileting  - Record patient progress and toleration of activity level   Outcome: Progressing  Goal: Patient will remain free of falls  Description: INTERVENTIONS:  - Educate patient/family on patient safety including physical limitations  - Instruct patient to call for assistance with activity   - Consult OT/PT to assist with strengthening/mobility   - Keep Call bell within reach  - Keep bed low and locked with side rails adjusted as appropriate  - Keep care items and personal belongings within reach  - Initiate and maintain comfort rounds  - Make Fall Risk Sign visible to staff  - Offer Toileting every  Hours, in advance of need  - Initiate/Maintain alarm  - Obtain necessary fall risk management equipment:   - Apply yellow socks and bracelet for high fall risk patients  - Consider moving patient to room near nurses station  Outcome: Progressing     Problem: DISCHARGE PLANNING  Goal: Discharge to home or other facility with appropriate resources  Description: INTERVENTIONS:  - Identify barriers to discharge w/patient and caregiver  - Arrange for needed discharge resources and transportation as appropriate  - Identify discharge learning needs (meds, wound care, etc.)  - Arrange for interpretive services to assist at discharge as needed  - Refer to Case Management Department for coordinating discharge planning if the patient needs post-hospital services based on physician/advanced practitioner order or complex needs related to functional status, cognitive ability, or social support system  Outcome: Progressing     Problem: Knowledge Deficit  Goal: Patient/family/caregiver demonstrates understanding of disease process, treatment plan, medications, and discharge instructions  Description: Complete learning assessment and assess knowledge base.   Interventions:  - Provide teaching at level of understanding  - Provide teaching via preferred learning methods  Outcome: Progressing     Problem: RESPIRATORY - ADULT  Goal: Achieves optimal ventilation and oxygenation  Description: INTERVENTIONS:  - Assess for changes in respiratory status  - Assess for changes in mentation and behavior  - Position to facilitate oxygenation and minimize respiratory effort  - Oxygen administered by appropriate delivery if ordered  - Initiate smoking cessation education as indicated  - Encourage broncho-pulmonary hygiene including cough, deep breathe, Incentive Spirometry  - Assess the need for suctioning and aspirate as needed  - Assess and instruct to report SOB or any respiratory difficulty  - Respiratory Therapy support as indicated  Outcome: Progressing     Problem: GENITOURINARY - ADULT  Goal: Maintains or returns to baseline urinary function  Description: INTERVENTIONS:  - Assess urinary function  - Encourage oral fluids to ensure adequate hydration if ordered  - Administer IV fluids as ordered to ensure adequate hydration  - Administer ordered medications as needed  - Offer frequent toileting  - Follow urinary retention protocol if ordered  Outcome: Progressing  Goal: Absence of urinary retention  Description: INTERVENTIONS:  - Assess patient’s ability to void and empty bladder  - Monitor I/O  - Bladder scan as needed  - Discuss with physician/AP medications to alleviate retention as needed  - Discuss catheterization for long term situations as appropriate  Outcome: Progressing     Problem: METABOLIC, FLUID AND ELECTROLYTES - ADULT  Goal: Electrolytes maintained within normal limits  Description: INTERVENTIONS:  - Monitor labs and assess patient for signs and symptoms of electrolyte imbalances  - Administer electrolyte replacement as ordered  - Monitor response to electrolyte replacements, including repeat lab results as appropriate  - Instruct patient on fluid and nutrition as appropriate  Outcome: Progressing  Goal: Fluid balance maintained  Description: INTERVENTIONS:  - Monitor labs   - Monitor I/O and WT  - Instruct patient on fluid and nutrition as appropriate  - Assess for signs & symptoms of volume excess or deficit  Outcome: Progressing  Goal: Glucose maintained within target range  Description: INTERVENTIONS:  - Monitor Blood Glucose as ordered  - Assess for signs and symptoms of hyperglycemia and hypoglycemia  - Administer ordered medications to maintain glucose within target range  - Assess nutritional intake and initiate nutrition service referral as needed  Outcome: Progressing

## 2023-11-12 NOTE — ASSESSMENT & PLAN NOTE
Lab Results   Component Value Date    CHOLESTEROL 118 10/09/2023    HDL 14 (L) 10/09/2023    TRIG 144 10/09/2023    [address] 10/09/2023     Continue outpatient diet and lifestyle modification.

## 2023-11-12 NOTE — ASSESSMENT & PLAN NOTE
Lab Results   Component Value Date    CREATININE 1.24 11/12/2023    CREATININE 1.04 11/10/2023    CREATININE 0.97 11/09/2023     Likely prerenal.  Second to poor oral intake versus poor urine output. Baseline creatinine 1 to 1.2.     Cr at baseline  Plan:  Urinary retention protocol, Daily weights, I/O  Trend Cr daily

## 2023-11-12 NOTE — PROGRESS NOTES
[facility]  Progress Note  Name: Donna Canela  MRN: [id number]  Unit/Bed#: ICU 03 I Date of Admission: 9/13/2023   Date of Service: 11/12/2023 I Hospital Day: 61    Assessment/Plan   * Acute respiratory failure with hypoxia secondary to oropharyngeal mass  Assessment & Plan  Cuffless trach placed 9/17, transitioned to cuffed on 10/3. Oxygen requirements not improving. For mental health patient is on buspirone, quetiapine, and sertraline. For pain, gabapentin, oxycodone scheduled and prn, prn oxycodone mainly utilized for increased pain during chemo and after a bronch. On Guaifenesin, Atrovent and Xopenex for airway maintenance. No improvement in bilateral consolidation or atelectasis and groundglass opacities on repeat CT and chest x-rays. Bronchial cx with 3 colonies CoNS. PCP PCR invalid, repeat negative. CTCAP 10/12:  indicates additional groundglass opacity with paraseptal emphysema, which may be contributing to inability to remain off vent. Bronchoscopy performed and unremarkable. Samples sent to lab for culture. Patient was placed back on vent s/p procedure. Now on high flow. Completed 7 day course of cefepime and Vancomycin. Bronchial culture and gram stain negative. CMV, AFB, Fungitell and Pneumocystis jiroveci (carinii) negative. Histoplasma antigen, Aspergillus galactomannan antigen and antibody negative. Secretions seen in trach. Sputum culture shows 2+ polys, 1+ gram-positive cocci in pairs, chains and clusters and 1+ gram-negative rods. Cuffless trach was placed 9/17. Replaced with a cuffed Shiley XLT #7 10/3    Plan:  F/u sputum culture results day 48 hours. ID consulted, appreciate recommendations  Likely Chemo induced pneumonitis. Continue inhalation treatment with Pulmicort q12, duonebs QID, and symbicort q12. Continue trach collar during the day and at night. Continue her on dysphagia 2 diet.    Wean down HFNC to a goal FiO2 of 50% to be discharged  Palliative on board to discuss goals of care. Patient motivated to work with PT. Family meeting on Monday at [address] [place] [address] with patient, her daughter, son, , CM and myself. Patient has made it clear she wants to continue to have chemotherapy. Large cell lymphoma ([address])  Assessment & Plan  Large B-cell lymphoma, stage II. First chemo cycle 9/22 4 days of R-EPOCH. 9/27 Rituxan. 9/28 Filgrastim. Received nivestym 300 mc 7 days dcd on 10/26      Prophylaxis:  Bactrim prophylaxis Monday Wednesday Friday,  Acyclovir 400 mg twice daily  Fluconazole 200 mg daily  Levofloxacin 250 mg daily  Lovenox 40 mg daily    9/22  4 days of R-EPOCH.  9/27 Rituxan  10/13 for [facility] cycle 2, which was done over 4 days and cytoxan on 10/19.  11/6 completed R-EPOCH cycle 3 and Cytoxan 11/7. Plan:  Oncology is following. Plan for repeat PET-CT on hospital discharge to evaluate disease response. Transfuse blood products as needed. Keep Hgb>7 and Plt>20 for chemo   See acute respiratory failure above    Oropharyngeal mass  Assessment & Plan  9/14 Neck/ soft tissue CT: Large enhancing soft tissue mass identified within the left neck involving multiple spaces. Centered within the left oropharynx with extensions as described above into multiple spaces. This results in significant airway compromise. 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 Small level 3/level 4 nodes are seen within the neck. Tracheostomy by ENT. Replaced on 9/26. H/o tobacco abuse, daily smoker. On nicotine while inpatient, confirmed with patient she needs nicotine patch. CT soft tissue neck shows reduced mass effect from 9/14 to 10/13. Can consider reducing trach size if continues to improve. Patient often refusing peg tube feeds but is feeding herself small meals orally. Plan:  ENT and heme onc following  As per speech therapist diet  Dysphagia 2. Continue parallel PEG tube feeds for nutrition.    See acute respiratory failure and large B cell lymphoma above. Pancytopenia due to chemotherapy [facility])  Assessment & Plan  Patient received 1 PRBC transfusion on 10/5 and 10/25. Her B/L hemoglobin is 12-14. No sites of bleeding, no black stools. Iron panel indicative of inflammatory anemia. Normal Vitamin B12 levels. Folate is low 5.5.   11/12 Hb is 6.0    Plan:  Transfuse 1 PRBC leukoreduced and irradiated. Repeat H and H post transfusion. Trend hemoglobin and transfuse for <7. As per heme/onc transfuse irradiated and leukoreduced blood products. Trend platelets  Folic acid 1mg daily supplementation  As per my conversation with Heme/oncology attending, patient is expected to have chemotherapy associated pancytopenia. No further anemia w/u required. Hyperkalemia-resolved as of 11/10/2023  Assessment & Plan  Recent Labs     11/10/23  0502 11/12/23  0448   K 3.5 3.5   MG  --  1.9         Workup:  Etiology: Patient takes bactrim. Tumor lysis would be less likely since calcium is elevated rather than decreased. Uric acid is normal.   K+ elevated but returned to normal after medical management. Will give another round of medical management if elevated again. Plan:  Trend potassium on daily BMP. PO (acute kidney injury) (HCC)-resolved as of 9/21/2023  Assessment & Plan  Lab Results   Component Value Date    CREATININE 1.24 11/12/2023    CREATININE 1.04 11/10/2023    CREATININE 0.97 11/09/2023     Likely prerenal.  Second to poor oral intake versus poor urine output. Baseline creatinine 1 to 1.2. Cr at baseline  Plan:  Urinary retention protocol, Daily weights, I/O  Trend Cr daily    RML pneumonia-resolved as of 9/22/2023  Assessment & Plan  9/13 CT PE study: Patchy consolidation right middle lobe, new from 8/25/2023, compatible with pneumonia. No leukocytosis, no fever/chills. Positive for sore throat, cough. New onset pneumonia after recent hospitalization and antibiotics treatment. 9/14: Bronchoscopy/ ABL. MRSA negative.    ABL revealed 2+ Growth of Candida albicans, suspected contaminant. Blister (nonthermal) of left forearm, sequela  Assessment & Plan  Blisters of left upper extremity healing, no signs of infection, continue daily wound care. Generalized abdominal pain  Assessment & Plan  Patient reports abdominal pain which is unchanged since 9/22 no guarding on exam. Takes Famotidine for GERD at home. Alk phos 10/2 145, 10/20 79. CTCAP reveals complex right upper pole renal lesion requiring possible outpatient MRI    Continue Famotidine  On bowel regimen with Senna and Miralax prn    (HFpEF) heart failure with preserved ejection fraction [facility])  Assessment & Plan  Wt Readings from Last 3 Encounters:   11/08/23 79.5 kg (175 lb 4.3 oz)   09/07/23 74.6 kg (164 lb 6.4 oz)   08/30/23 73.3 kg (161 lb 9.6 oz)     Lab Results   Component Value Date    LVEF 60 08/28/2023     (H) 10/28/2023     (H) 09/29/2023     Echo 8/28/23: LVEF 60%. Plan:  K > 4   Measure I/O      Ambulatory dysfunction  Assessment & Plan  2/2 Impacted by chronic pain syndrome + prolonged hospital stay. Continue OOB with PT. PT rec post acute rehab however reportedly Cannot get LTACH placement while getting chemo per CM  She is aware STR SNFs continue to follow and treatment can be done from a SNF level of care. PT would need to be on trach collar for at least 72 hours with no need for the vent. Aware that pt would need to be around 28% FiO2     Depression  Assessment & Plan  Patient on Zoloft 75 daily and Seroquel hs  Patient is depressed, multiple family/palliative discussions. patient is firm that she wants to live and to fight, she is just tired. Currently goal is disease treatment oriented. Full code. No SI/HI ideations. Palliative signed off, will reconsult if patient changes her mind regarding wishes. Chronic Superficial thrombophlebitis  Assessment & Plan  Right forearm soreness. RUE US: No acute or chronic deep vein thrombosis.  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 PO not severe enough to require renal dosing at this time, will continue to monitor and adjust dosing as needed. Continue DVT ppx with Lovenox 40    CKD (chronic kidney disease) stage 3, GFR 30-59 ml/min [facility])  Assessment & Plan  Lab Results   Component Value Date    EGFR 44 11/12/2023    EGFR 54 11/10/2023    EGFR 59 11/09/2023    CREATININE 1.24 11/12/2023    CREATININE 1.04 11/10/2023    CREATININE 0.97 11/09/2023     Baseline Cr between 0.75-1.1  Initial PO felt 2/2 prerenal azotemia 2/2 diuresis, reduced PO intake +/- ATN. Patient received 2 doses of Bumex IV 2 g as she had not been responding appropriately to IV 40 Lasix daily. Creatinine went up by 0.5 meeting criteria for an PO. This may be prerenal intrinsic or postrenal.  Potential prerenal causes include reduced kidney perfusion due to lisinopril. Intrinsic can also be lisinopril due to ATN, AIN from chemo medications, TLS, crystaline nephropathy from acyclovir, rituxan nephrotoxicity, filgrastim glomerulonephritis. Post renal obstructive could be from urine retention from vincristine or cyclophosphamide bladder fibrosis. Suspect most likely due to medications as a combination of prerenal and intrinsic. FENa was 0.2%, UA shows no casts or signs of infection, urine eosinophils 0%. Patient likely has prerenal PO from volume depletion. Received 2 L NS and 1 pRBC and cr went up by 0.07 still and Na and Cl went down, suspect that volume prevented further cr rise and free water excretion impaired by kidney function, allowing hyponatremia to increase. Creatinine increase is likely plateaued and went down the following day. Suspect that now that nephrotoxic medications have been stopped she responded well to Lasix and creatinine downtrended. PO now resolved. Will be cautious about nephrotoxins and monitor as restarting EPOCH and ppx. Patient gets volume depleted and overloaded very easily. Especially from chemo. 10/19 Heriberto as cr went up by 0.3 in 48 hours from 0.82 on 10/17 to 1.22 on 10/19. Suspect this is prerenal hypovolemic, will see if patient improves with fluids. She gets overloaded easily so if no improvement suspect CRS again. 10/29 HERIBERTO cr went up by 0.3 again. She has 922 ml UOP in last 24 hours. HERIBERTO likely from lasix 40mg IV given yesterday. She is not hypovolemic. Plan:   Continue to monitor UO/renal function. Avoid nephrotoxic agents  Continue to monitor a.m. BMP. Pain syndrome, chronic  Assessment & Plan  Home regimen: Oxycodone 5 mg BID, Tylenol 650 mg q8 prn. Gabapentin 600 mg TID. Medical marijuana. Lumbar radiculopathy and impaired ambulatory dysfunction secondary to pain. Has bowel regimen and is having bowel movements daily. Patient required additional pain management during previous cycle and has some additional pain from tunneled line placement    Plan:  Continue gabapentin 300 mg TID, oxycodone 5 mg TID, prn Tylenol 650 mg q6. Oxycodone 5mg every 8 hours  Oxycodone 2.5 mg q6 PRN    Benign essential hypertension  Assessment & Plan  Home regimen Coreg 12.5 mg BID, Amlodipine 10 mg daily, and lisinopril 40 mg. All discontinued at this time secondary to hypotension and HERIBERTO since cycle 1. but stable currently without any antihypertensives. Systolic persistently elevated and tachycardic, potentially secondary to pain. Patient was more hypotensive during last chemo. Coreg contraindicated during chemo, since cycles are every other week, would be better to stick with lopressor during duration of therapy as opposed to switching constantly. Plan:  Monitor vitals. Continue Norvasc 10 and lopressor 25 bid  Prn hydralazine for SBP > 160    Angioedema-resolved as of 11/4/2023  Assessment & Plan  POA in [facility] ED: Swollen tongue, soft and hard palate with severe angioedema. Decadron 10 mg, Benadryl 25 mg given.  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 Plan:  Cuffless trach 9/17, cuffed Shiley XLT #7 10/3  See acute respiratory failure and oropharyngeal mass above    Hyperlipidemia-resolved as of 10/26/2023  Assessment & Plan  Lab Results   Component Value Date    CHOLESTEROL 118 10/09/2023    HDL 14 (L) 10/09/2023    TRIG 144 10/09/2023    [address] 10/09/2023     Continue outpatient diet and lifestyle modification. COPD without exacerbation (HCC)-resolved as of 9/26/2023  Assessment & Plan  Continue vent with nebs. Encephalopathy-resolved as of 9/21/2023  Assessment & Plan  9/19- Pt appeared to be AAO x3. Improving. ICU Core Measures     Vented Patient  VAP Bundle  VAP bundle ordered      A: Assess, Prevent, and Manage Pain Has pain been assessed? Yes  Need for changes to pain regimen? No   B: Both Spontaneous Awakening Trials (SATs) and Spontaneous Breathing Trials (SBTs) Plan to perform spontaneous awakening trial today? N/A       C: Choice of Sedation RASS Goal: 0 Alert and Calm  Need for changes to sedation or analgesia regimen? No   D: Delirium CAM-ICU: Negative   E: Early Mobility  Plan for early mobility? Yes   F: Family Engagement Plan for family engagement today? Yes        Antibiotic Review: Patient on appropriate coverage based on culture data. Review of Invasive Devices:        Central access plan: Medications requiring central line      Prophylaxis:  VTE VTE covered by:  enoxaparin, Subcutaneous, 40 mg at 11/11/23 0831       Stress Ulcer  covered byfamotidine (PEPCID) tablet 20 mg [[id number]]       Subjective   HPI/24hr events: She had and episode of desaturation overnight and was raised to 100%. She seems to desaturate when lying down. She has refused blood for 3 days in a row. Review of systems is unremarkable.           Objective                            Vitals I/O      Most Recent Min/Max in 24hrs   Temp 98.1 °F (36.7 °C) Temp  Min: 97.5 °F (36.4 °C)  Max: 99 °F (37.2 °C)   Pulse 92 Pulse  Min: 79 Max: 97   Resp (!) 23 Resp  Min: 15  Max: 39   /64 BP  Min: 90/55  Max: 133/79   O2 Sat 96 % SpO2  Min: 85 %  Max: 100 %      Intake/Output Summary (Last 24 hours) at 11/12/2023 0709  Last data filed at 11/12/2023 0400  Gross per 24 hour   Intake 320 ml   Output 450 ml   Net -130 ml         Diet Dysphagia/Modified Consistency; Dysphagia 2-Mechanical Soft; Honey Thick Liquid     Invasive Monitoring Physical exam    Physical Exam  Eyes:      Extraocular Movements: Extraocular movements intact. Pupils: Pupils are equal, round, and reactive to light. Skin:     General: Skin is warm and dry. HENT:      Head: Normocephalic and atraumatic. Cardiovascular:      Rate and Rhythm: Normal rate and regular rhythm. Musculoskeletal:         General: Normal range of motion. Abdominal:      Tenderness: There is no abdominal tenderness. Constitutional:       General: She is not in acute distress. Appearance: She is well-developed. She is not ill-appearing. Pulmonary:      Effort: Pulmonary effort is normal.      Breath sounds: Normal breath sounds. No stridor. No wheezing, rhonchi or rales. Secretions are abnormal .Chest:      Chest wall: No tenderness. Neurological:      General: No focal deficit present. Mental Status: She is alert and oriented to person, place and time. Mental status is at baseline. She is calm. Vitals and nursing note reviewed.             Diagnostic Studies      EKG: NSR  Imaging: N/a      Medications:  Scheduled PRN   acyclovir, 400 mg, BID  amLODIPine, 10 mg, Daily  budesonide, 0.5 mg, Q12H  busPIRone, 30 mg, TID  chlorhexidine, 15 mL, Q12H ASHLEY  enoxaparin, 40 mg, Q24H ASHLEY  famotidine, 20 mg, BID  fluconazole, 843 mg, Daily  folic acid, 1 mg, Daily  formoterol, 20 mcg, Q12H  furosemide, 40 mg, BID (diuretic)  gabapentin, 300 mg, TID  levalbuterol, 1.25 mg, Q6H   And  ipratropium, 0.5 mg, Q6H  melatonin, 3 mg, HS  metoprolol tartrate, 25 mg, Q12H ASHLEY  nicotine, 7 mg, Daily  oxyCODONE, 5 mg, Q8H  QUEtiapine, 50 mg, HS  senna, 8.8 mg, HS  sertraline, 75 mg, Daily  sodium chloride, 4 mL, TID  sulfamethoxazole-trimethoprim, 1 tablet, Once per day on Mon Wed Fri      alteplase, 2 mg, Q1MIN PRN  alteplase, 2 mg, Q1MIN PRN  alteplase, 2 mg, Q1MIN PRN  ondansetron, 4 mg, Q8H PRN  oxyCODONE, 2.5 mg, Q6H PRN       Continuous          Labs:    CBC    Recent Labs     11/10/23  0915 11/12/23  0542   WBC 43.80* 2.20*   HGB 7.3* 6.0*   HCT 22.8* 19.4*    222   BANDSPCT 2  --      BMP    Recent Labs     11/12/23  0448   SODIUM 145   K 3.5      CO2 32   AGAP 6   BUN 42*   CREATININE 1.24   CALCIUM 9.5       Coags    No recent results     Additional Electrolytes  Recent Labs     11/11/23  0451 11/12/23  0448   MG  --  1.9   PHOS 4.5* 3.9   CAIONIZED  --  1.25          Blood Gas    No recent results  No recent results LFTs  No recent results    Infectious  No recent results  Glucose  Recent Labs     11/12/23  0448   GLUC 105                 Jose Alfredo Sosa MD

## 2023-11-12 NOTE — ASSESSMENT & PLAN NOTE
Patient received 1 PRBC transfusion on 10/5 and 10/25. Her B/L hemoglobin is 12-14. No sites of bleeding, no black stools. Iron panel indicative of inflammatory anemia. Normal Vitamin B12 levels. Folate is low 5.5.   11/12 Hb is 6.0    Plan:  Transfuse 1 PRBC leukoreduced and irradiated. Repeat H and H post transfusion. Trend hemoglobin and transfuse for <7. As per heme/onc transfuse irradiated and leukoreduced blood products. Trend platelets  Folic acid 1mg daily supplementation  As per my conversation with Heme/oncology attending, patient is expected to have chemotherapy associated pancytopenia. No further anemia w/u required.

## 2023-11-12 NOTE — WOUND OSTOMY CARE
Progress Note - Wound   Ashutosh Taylor 77 y.o. female MRN: [id number]  Unit/Bed#: ICU 03 Encounter: [id number]      Assessment:   Patient seen today for wound care follow up assessment. Patient is incontinent of bowel and bladder. Patient is max assist with turning from side to side for assessment. Patient is independent with meals. Assessment Findings:   Sacral/buttocks and B/L heels intact and blanching, preventative skin care orders placed. 1. Unstageable pressure injury now healing stage III under trach- wound continues to improve, appears partial thickness with 100% pink granular tissue, periwound skin is intact, scant amount of serosanguineous drainage. No induration, fluctuance, odor, warmth/temperature differences, redness, or purulence noted to the above noted wounds and skin areas assessed. New dressings applied per orders listed below. Patient tolerated well- no s/s of non-verbal pain or discomfort observed during the encounter. Bedside nurse aware of plan of care. See flow sheets for more detailed assessment findings. Wound care will continue to follow. Skin care plans:  1-Hydraguard to bilateral sacrum, buttock and heels BID and PRN  2-Elevate heels to offload pressure. 3-Ehob cushion in chair when out of bed. 4-Moisturize skin daily with skin nourishing cream.  5-Turn/reposition q2h or when medically stable for pressure re-distribution on skin. 6-Cleanse neck wound with normal saline, apply maxorb to wound, cover with split gauze, change daily and PRN soilage/displacement. Wound 09/21/23 Pressure Injury Throat Medial (Active)   Wound Image   11/12/23 1021   Wound Description Fragile;Pink 11/12/23 1200   Pressure Injury Stage U 11/08/23 0830   Tracy-wound Assessment Clean;Dry; Intact 11/12/23 1200   Wound Length (cm) 0.5 cm 11/12/23 1021   Wound Width (cm) 1 cm 11/12/23 1021   Wound Depth (cm) 0.1 cm 11/12/23 1021   Wound Surface Area (cm^2) 0.5 cm^2 11/12/23 1021   Wound Volume (cm^3) 0.05 cm^3 11/12/23 1021   Calculated Wound Volume (cm^3) 0.05 cm^3 11/12/23 1021   Change in Wound Size % 75 11/12/23 1021   Tunneling 0 cm 11/12/23 1021   Tunneling in depth located at 0 11/12/23 1021   Undermining 0 11/12/23 1021   Undermining is depth extending from 0 11/12/23 1021   Wound Site Closure MICA 11/12/23 1021   Drainage Amount Scant 11/12/23 1021   Drainage Description Serosanguineous 11/12/23 1021   Non-staged Wound Description Partial thickness 11/12/23 1021   Treatments Cleansed 11/12/23 1021   Dressing Gauze 11/12/23 1200   Wound packed? No 11/12/23 1021   Packing- # removed 0 11/12/23 1021   Packing- # inserted 0 11/12/23 1021   Dressing Changed New 11/12/23 1021   Patient Tolerance Tolerated well 11/12/23 1021   Dressing Status Clean;Dry; Intact 11/12/23 1021             Megan Galan RN, [facility]

## 2023-11-12 NOTE — ASSESSMENT & PLAN NOTE
POA in [facility] ED: Swollen tongue, soft and hard palate with severe angioedema. Decadron 10 mg, Benadryl 25 mg given. 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     Plan:  Cuffless trach 9/17, cuffed Shiley XLT #7 10/3  See acute respiratory failure and oropharyngeal mass above

## 2023-11-12 NOTE — ASSESSMENT & PLAN NOTE
Recent Labs     11/10/23  0502 11/12/23  0448   K 3.5 3.5   MG  --  1.9         Workup:  Etiology: Patient takes bactrim. Tumor lysis would be less likely since calcium is elevated rather than decreased. Uric acid is normal.   K+ elevated but returned to normal after medical management. Will give another round of medical management if elevated again. Plan:  Trend potassium on daily BMP.

## 2023-11-12 NOTE — ASSESSMENT & PLAN NOTE
Cuffless trach placed 9/17, transitioned to cuffed on 10/3. Oxygen requirements not improving. For mental health patient is on buspirone, quetiapine, and sertraline. For pain, gabapentin, oxycodone scheduled and prn, prn oxycodone mainly utilized for increased pain during chemo and after a bronch. On Guaifenesin, Atrovent and Xopenex for airway maintenance. No improvement in bilateral consolidation or atelectasis and groundglass opacities on repeat CT and chest x-rays. Bronchial cx with 3 colonies CoNS. PCP PCR invalid, repeat negative. CTCAP 10/12:  indicates additional groundglass opacity with paraseptal emphysema, which may be contributing to inability to remain off vent. Bronchoscopy performed and unremarkable. Samples sent to lab for culture. Patient was placed back on vent s/p procedure. Now on high flow. Completed 7 day course of cefepime and Vancomycin. Bronchial culture and gram stain negative. CMV, AFB, Fungitell and Pneumocystis jiroveci (carinii) negative. Histoplasma antigen, Aspergillus galactomannan antigen and antibody negative. Secretions seen in trach. Sputum culture shows 2+ polys, 1+ gram-positive cocci in pairs, chains and clusters and 1+ gram-negative rods. Cuffless trach was placed 9/17. Replaced with a cuffed Shiley XLT #7 10/3    Plan:  F/u sputum culture results day 48 hours. ID consulted, appreciate recommendations  Likely Chemo induced pneumonitis. Continue inhalation treatment with Pulmicort q12, duonebs QID, and symbicort q12. Continue trach collar during the day and at night. Continue her on dysphagia 2 diet. Wean down HFNC to a goal FiO2 of 50% to be discharged  Palliative on board to discuss goals of care. Patient motivated to work with PT. Family meeting on Monday at [address] [place] [address] with patient, her daughter, son, , CM and myself. Patient has made it clear she wants to continue to have chemotherapy.

## 2023-11-13 LAB
ABO GROUP BLD BPU: NORMAL
ANION GAP SERPL CALCULATED.3IONS-SCNC: 5 MMOL/L
ANISOCYTOSIS BLD QL SMEAR: PRESENT
ANISOCYTOSIS BLD QL SMEAR: PRESENT
BASOPHILS # BLD MANUAL: 0 THOUSAND/UL (ref 0–0.1)
BASOPHILS # BLD MANUAL: 0.02 THOUSAND/UL (ref 0–0.1)
BASOPHILS NFR MAR MANUAL: 0 % (ref 0–1)
BASOPHILS NFR MAR MANUAL: 2 % (ref 0–1)
BPU ID: NORMAL
BUN SERPL-MCNC: 31 MG/DL (ref 5–25)
CA-I BLD-SCNC: 1.24 MMOL/L (ref 1.12–1.32)
CALCIUM SERPL-MCNC: 9.6 MG/DL (ref 8.4–10.2)
CHLORIDE SERPL-SCNC: 108 MMOL/L (ref 96–108)
CO2 SERPL-SCNC: 33 MMOL/L (ref 21–32)
CREAT SERPL-MCNC: 0.99 MG/DL (ref 0.6–1.3)
CROSSMATCH: NORMAL
EOSINOPHIL # BLD MANUAL: 0.02 THOUSAND/UL (ref 0–0.4)
EOSINOPHIL # BLD MANUAL: 0.05 THOUSAND/UL (ref 0–0.4)
EOSINOPHIL NFR BLD MANUAL: 2 % (ref 0–6)
EOSINOPHIL NFR BLD MANUAL: 6 % (ref 0–6)
ERYTHROCYTE [DISTWIDTH] IN BLOOD BY AUTOMATED COUNT: 16.7 % (ref 11.6–15.1)
ERYTHROCYTE [DISTWIDTH] IN BLOOD BY AUTOMATED COUNT: 16.7 % (ref 11.6–15.1)
FUNGUS SPEC CULT: NORMAL
GFR SERPL CREATININE-BSD FRML MDRD: 57 ML/MIN/1.73SQ M
GLUCOSE SERPL-MCNC: 95 MG/DL (ref 65–140)
HCT VFR BLD AUTO: 22.7 % (ref 34.8–46.1)
HCT VFR BLD AUTO: 23.3 % (ref 34.8–46.1)
HGB BLD-MCNC: 7.2 G/DL (ref 11.5–15.4)
HGB BLD-MCNC: 7.3 G/DL (ref 11.5–15.4)
HYPERCHROMIA BLD QL SMEAR: PRESENT
HYPERCHROMIA BLD QL SMEAR: PRESENT
LYMPHOCYTES # BLD AUTO: 0.42 THOUSAND/UL (ref 0.6–4.47)
LYMPHOCYTES # BLD AUTO: 0.5 THOUSAND/UL (ref 0.6–4.47)
LYMPHOCYTES # BLD AUTO: 44 % (ref 14–44)
LYMPHOCYTES # BLD AUTO: 57 % (ref 14–44)
MAGNESIUM SERPL-MCNC: 1.8 MG/DL (ref 1.9–2.7)
MCH RBC QN AUTO: 29.9 PG (ref 26.8–34.3)
MCH RBC QN AUTO: 31.1 PG (ref 26.8–34.3)
MCHC RBC AUTO-ENTMCNC: 30.9 G/DL (ref 31.4–37.4)
MCHC RBC AUTO-ENTMCNC: 32.2 G/DL (ref 31.4–37.4)
MCV RBC AUTO: 97 FL (ref 82–98)
MCV RBC AUTO: 97 FL (ref 82–98)
MONOCYTES # BLD AUTO: 0.02 THOUSAND/UL (ref 0–1.22)
MONOCYTES # BLD AUTO: 0.04 THOUSAND/UL (ref 0–1.22)
MONOCYTES NFR BLD: 2 % (ref 4–12)
MONOCYTES NFR BLD: 4 % (ref 4–12)
NEUTROPHILS # BLD MANUAL: 0.29 THOUSAND/UL (ref 1.85–7.62)
NEUTROPHILS # BLD MANUAL: 0.48 THOUSAND/UL (ref 1.85–7.62)
NEUTS BAND NFR BLD MANUAL: 1 % (ref 0–8)
NEUTS SEG NFR BLD AUTO: 33 % (ref 43–75)
NEUTS SEG NFR BLD AUTO: 49 % (ref 43–75)
P JIROVECII AG SPEC QL IF: NORMAL
PHOSPHATE SERPL-MCNC: 3.5 MG/DL (ref 2.3–4.1)
PLATELET # BLD AUTO: 176 THOUSANDS/UL (ref 149–390)
PLATELET # BLD AUTO: 192 THOUSANDS/UL (ref 149–390)
PLATELET BLD QL SMEAR: ADEQUATE
PLATELET BLD QL SMEAR: ADEQUATE
PMV BLD AUTO: 9.2 FL (ref 8.9–12.7)
PMV BLD AUTO: 9.3 FL (ref 8.9–12.7)
POTASSIUM SERPL-SCNC: 3.5 MMOL/L (ref 3.5–5.3)
RBC # BLD AUTO: 2.35 MILLION/UL (ref 3.81–5.12)
RBC # BLD AUTO: 2.41 MILLION/UL (ref 3.81–5.12)
RBC MORPH BLD: PRESENT
RBC MORPH BLD: PRESENT
SODIUM SERPL-SCNC: 146 MMOL/L (ref 135–147)
UNIT DISPENSE STATUS: NORMAL
UNIT PRODUCT CODE: NORMAL
UNIT PRODUCT VOLUME: 350 ML
UNIT RH: NORMAL
WBC # BLD AUTO: 0.87 THOUSAND/UL (ref 4.31–10.16)
WBC # BLD AUTO: 0.96 THOUSAND/UL (ref 4.31–10.16)

## 2023-11-13 PROCEDURE — 94640 AIRWAY INHALATION TREATMENT: CPT

## 2023-11-13 PROCEDURE — 84100 ASSAY OF PHOSPHORUS: CPT

## 2023-11-13 PROCEDURE — 85027 COMPLETE CBC AUTOMATED: CPT

## 2023-11-13 PROCEDURE — 83735 ASSAY OF MAGNESIUM: CPT

## 2023-11-13 PROCEDURE — 82330 ASSAY OF CALCIUM: CPT

## 2023-11-13 PROCEDURE — 94760 N-INVAS EAR/PLS OXIMETRY 1: CPT

## 2023-11-13 PROCEDURE — 99232 SBSQ HOSP IP/OBS MODERATE 35: CPT | Performed by: INTERNAL MEDICINE

## 2023-11-13 PROCEDURE — 85007 BL SMEAR W/DIFF WBC COUNT: CPT

## 2023-11-13 PROCEDURE — 97530 THERAPEUTIC ACTIVITIES: CPT

## 2023-11-13 PROCEDURE — 80048 BASIC METABOLIC PNL TOTAL CA: CPT

## 2023-11-13 PROCEDURE — 97116 GAIT TRAINING THERAPY: CPT

## 2023-11-13 RX ORDER — MAGNESIUM SULFATE HEPTAHYDRATE 40 MG/ML
2 INJECTION, SOLUTION INTRAVENOUS ONCE
Status: COMPLETED | OUTPATIENT
Start: 2023-11-13 | End: 2023-11-13

## 2023-11-13 RX ADMIN — OXYCODONE HYDROCHLORIDE 5 MG: 5 SOLUTION ORAL at 11:50

## 2023-11-13 RX ADMIN — SODIUM CHLORIDE SOLN NEBU 3% 4 ML: 3 NEBU SOLN at 13:47

## 2023-11-13 RX ADMIN — LEVALBUTEROL HYDROCHLORIDE 1.25 MG: 1.25 SOLUTION RESPIRATORY (INHALATION) at 13:47

## 2023-11-13 RX ADMIN — FOLIC ACID 1 MG: 1 TABLET ORAL at 08:07

## 2023-11-13 RX ADMIN — BUSPIRONE HYDROCHLORIDE 30 MG: 10 TABLET ORAL at 08:09

## 2023-11-13 RX ADMIN — IPRATROPIUM BROMIDE 0.5 MG: 0.5 SOLUTION RESPIRATORY (INHALATION) at 13:47

## 2023-11-13 RX ADMIN — QUETIAPINE FUMARATE 50 MG: 25 TABLET ORAL at 21:11

## 2023-11-13 RX ADMIN — BUSPIRONE HYDROCHLORIDE 30 MG: 10 TABLET ORAL at 21:11

## 2023-11-13 RX ADMIN — GABAPENTIN 300 MG: 300 CAPSULE ORAL at 15:37

## 2023-11-13 RX ADMIN — METOPROLOL TARTRATE 25 MG: 25 TABLET, FILM COATED ORAL at 21:11

## 2023-11-13 RX ADMIN — SERTRALINE 75 MG: 25 TABLET, FILM COATED ORAL at 08:07

## 2023-11-13 RX ADMIN — MELATONIN 3 MG: at 21:12

## 2023-11-13 RX ADMIN — FUROSEMIDE 40 MG: 10 INJECTION, SOLUTION INTRAMUSCULAR; INTRAVENOUS at 08:06

## 2023-11-13 RX ADMIN — SODIUM CHLORIDE SOLN NEBU 3% 4 ML: 3 NEBU SOLN at 20:30

## 2023-11-13 RX ADMIN — IPRATROPIUM BROMIDE 0.5 MG: 0.5 SOLUTION RESPIRATORY (INHALATION) at 07:32

## 2023-11-13 RX ADMIN — FUROSEMIDE 40 MG: 10 INJECTION, SOLUTION INTRAMUSCULAR; INTRAVENOUS at 15:37

## 2023-11-13 RX ADMIN — LEVALBUTEROL HYDROCHLORIDE 1.25 MG: 1.25 SOLUTION RESPIRATORY (INHALATION) at 01:03

## 2023-11-13 RX ADMIN — ENOXAPARIN SODIUM 40 MG: 40 INJECTION SUBCUTANEOUS at 08:06

## 2023-11-13 RX ADMIN — FORMOTEROL FUMARATE DIHYDRATE 20 MCG: 20 SOLUTION RESPIRATORY (INHALATION) at 07:29

## 2023-11-13 RX ADMIN — GABAPENTIN 300 MG: 300 CAPSULE ORAL at 21:11

## 2023-11-13 RX ADMIN — BUDESONIDE 0.5 MG: 0.5 INHALANT ORAL at 07:29

## 2023-11-13 RX ADMIN — NICOTINE 7 MG: 7 PATCH, EXTENDED RELEASE TRANSDERMAL at 08:09

## 2023-11-13 RX ADMIN — FAMOTIDINE 20 MG: 20 TABLET, FILM COATED ORAL at 08:09

## 2023-11-13 RX ADMIN — BUDESONIDE 0.5 MG: 0.5 INHALANT ORAL at 20:34

## 2023-11-13 RX ADMIN — FLUCONAZOLE 400 MG: 100 TABLET ORAL at 08:07

## 2023-11-13 RX ADMIN — FORMOTEROL FUMARATE DIHYDRATE 20 MCG: 20 SOLUTION RESPIRATORY (INHALATION) at 20:34

## 2023-11-13 RX ADMIN — FILGRASTIM-AAFI 300 MCG: 300 INJECTION, SOLUTION SUBCUTANEOUS at 18:05

## 2023-11-13 RX ADMIN — OXYCODONE HYDROCHLORIDE 5 MG: 5 SOLUTION ORAL at 21:11

## 2023-11-13 RX ADMIN — LEVALBUTEROL HYDROCHLORIDE 1.25 MG: 1.25 SOLUTION RESPIRATORY (INHALATION) at 20:34

## 2023-11-13 RX ADMIN — LEVALBUTEROL HYDROCHLORIDE 1.25 MG: 1.25 SOLUTION RESPIRATORY (INHALATION) at 07:29

## 2023-11-13 RX ADMIN — ACYCLOVIR 400 MG: 200 CAPSULE ORAL at 08:08

## 2023-11-13 RX ADMIN — SODIUM CHLORIDE SOLN NEBU 3% 4 ML: 3 NEBU SOLN at 07:29

## 2023-11-13 RX ADMIN — OXYCODONE HYDROCHLORIDE 5 MG: 5 SOLUTION ORAL at 05:10

## 2023-11-13 RX ADMIN — GABAPENTIN 300 MG: 300 CAPSULE ORAL at 08:07

## 2023-11-13 RX ADMIN — IPRATROPIUM BROMIDE 0.5 MG: 0.5 SOLUTION RESPIRATORY (INHALATION) at 01:03

## 2023-11-13 RX ADMIN — SULFAMETHOXAZOLE AND TRIMETHOPRIM 1 TABLET: 800; 160 TABLET ORAL at 08:09

## 2023-11-13 RX ADMIN — IPRATROPIUM BROMIDE 0.5 MG: 0.5 SOLUTION RESPIRATORY (INHALATION) at 20:34

## 2023-11-13 RX ADMIN — CHLORHEXIDINE GLUCONATE 15 ML: 1.2 SOLUTION ORAL at 08:06

## 2023-11-13 RX ADMIN — BUSPIRONE HYDROCHLORIDE 30 MG: 10 TABLET ORAL at 15:37

## 2023-11-13 RX ADMIN — METOPROLOL TARTRATE 25 MG: 25 TABLET, FILM COATED ORAL at 08:07

## 2023-11-13 RX ADMIN — MAGNESIUM SULFATE HEPTAHYDRATE 2 G: 40 INJECTION, SOLUTION INTRAVENOUS at 08:30

## 2023-11-13 RX ADMIN — AMLODIPINE BESYLATE 10 MG: 5 TABLET ORAL at 08:07

## 2023-11-13 RX ADMIN — CHLORHEXIDINE GLUCONATE 15 ML: 1.2 SOLUTION ORAL at 21:11

## 2023-11-13 NOTE — ASSESSMENT & PLAN NOTE
Lab Results   Component Value Date    EGFR 57 11/13/2023    EGFR 44 11/12/2023    EGFR 54 11/10/2023    CREATININE 0.99 11/13/2023    CREATININE 1.24 11/12/2023    CREATININE 1.04 11/10/2023     Baseline Cr between 0.75-1.1  Initial HERIBERTO felt 2/2 prerenal azotemia 2/2 diuresis, reduced PO intake +/- ATN. Patient received 2 doses of Bumex IV 2 g as she had not been responding appropriately to IV 40 Lasix daily. Creatinine went up by 0.5 meeting criteria for an HERIBERTO. Suspect most likely due to medications as a combination of prerenal and intrinsic. FENa was 0.2%, UA shows no casts or signs of infection, urine eosinophils 0%. Patient likely has prerenal HERIBERTO from volume depletion. Received 2 L NS and 1 pRBC and cr went up by 0.07 still and Na and Cl went down, suspect that volume prevented further cr rise and free water excretion impaired by kidney function, allowing hyponatremia to increase. Suspect that now that nephrotoxic medications have been stopped she responded well to Lasix and creatinine downtrended. HERIBERTO now resolved. Will be cautious about nephrotoxins and monitor as restarting EPOCH and ppx. Patient gets volume depleted and overloaded very easily. Especially from chemo. 10/19 Heriberto as cr went up by 0.3 in 48 hours from 0.82 on 10/17 to 1.22 on 10/19. Suspect this is prerenal hypovolemic, will see if patient improves with fluids. She gets overloaded easily so if no improvement suspect CRS again. 10/29 HERIBERTO cr went up by 0.3 again. She has 922 ml UOP in last 24 hours. HERIBERTO likely from lasix 40mg IV given yesterday. She is not hypovolemic. Plan:   Continue to monitor UO/renal function. Avoid nephrotoxic agents  Continue to monitor a.m. BMP.

## 2023-11-13 NOTE — ASSESSMENT & PLAN NOTE
Lab Results   Component Value Date    CREATININE 0.99 11/13/2023    CREATININE 1.24 11/12/2023    CREATININE 1.04 11/10/2023     Likely prerenal.  Second to poor oral intake versus poor urine output. Baseline creatinine 1 to 1.2.     Cr at baseline  Plan:  Urinary retention protocol, Daily weights, I/O  Trend Cr daily

## 2023-11-13 NOTE — ASSESSMENT & PLAN NOTE
Recent Labs     11/12/23  0448 11/13/23  0507   K 3.5 3.5   MG 1.9 1.8*     Workup:  Etiology: Patient takes bactrim. Tumor lysis would be less likely since calcium is elevated rather than decreased. Uric acid is normal.   K+ elevated but returned to normal after medical management. Will give another round of medical management if elevated again. Plan:  Trend potassium on daily BMP.

## 2023-11-13 NOTE — SOCIAL WORK
Palliative LSW saw patient at the bedside today. LSW appreciates the opportunity to provide patient/family with inpatient emotional support and guidance while patient continues to receive medical attention from the medical team.     Topics discussed: Met with pt at bedside for continued support. LSW provided emotional support as pt reflected on the challenges of being hospitalized for a long period of time. Pt reports she "wants out tomorrow." She is very frustrated at still being in the hospital as she is "not sure what the hold-up is" and she feels she will do better at a rehab facility, and would be "further along" in her progress. She reports she has been able to work with physical therapy and practices the exercises that she has been shown in-between visits. Pt continues to be visited by Spiritual care and finds the conversations beneficial for her. She continues to be well-supported by her family whom visit with her regularly. Pt denies anything that LSW or medical team can do to help support her better at this time.        Areas that need follow-up: Emotional Support as requested by pt/family  Resources given: None  Others present: None      I have spent 20 minutes with Patient today in which greater than 50% of this time was spent in counseling/coordination of care regarding Counseling / Coordination of care; Communication with other [address] will continue to follow as requested by the medical team, patient, or family

## 2023-11-13 NOTE — PLAN OF CARE
Problem: MOBILITY - ADULT  Goal: Maintain or return to baseline ADL function  Description: INTERVENTIONS:  -  Assess patient's ability to carry out ADLs; assess patient's baseline for ADL function and identify physical deficits which impact ability to perform ADLs (bathing, care of mouth/teeth, toileting, grooming, dressing, etc.)  - Assess/evaluate cause of self-care deficits   - Assess range of motion  - Assess patient's mobility; develop plan if impaired  - Assess patient's need for assistive devices and provide as appropriate  - Encourage maximum independence but intervene and supervise when necessary  - Involve family in performance of ADLs  - Assess for home care needs following discharge   - Consider OT consult to assist with ADL evaluation and planning for discharge  - Provide patient education as appropriate  Outcome: Progressing  Goal: Maintains/Returns to pre admission functional level  Description: INTERVENTIONS:  - Perform BMAT or MOVE assessment daily.   - Set and communicate daily mobility goal to care team and patient/family/caregiver. - Collaborate with rehabilitation services on mobility goals if consulted  - Perform Range of Motion  times a day. - Reposition patient every  hours.   - Dangle patient  times a day  - Stand patient  times a day  - Ambulate patient  times a day  - Out of bed to chair  times a day   - Out of bed for meals  times a day  - Out of bed for toileting  - Record patient progress and toleration of activity level   Outcome: Progressing     Problem: Prexisting or High Potential for Compromised Skin Integrity  Goal: Skin integrity is maintained or improved  Description: INTERVENTIONS:  - Identify patients at risk for skin breakdown  - Assess and monitor skin integrity  - Assess and monitor nutrition and hydration status  - Monitor labs   - Assess for incontinence   - Turn and reposition patient  - Assist with mobility/ambulation  - Relieve pressure over bony prominences  - Avoid friction and shearing  - Provide appropriate hygiene as needed including keeping skin clean and dry  - Evaluate need for skin moisturizer/barrier cream  - Collaborate with interdisciplinary team   - Patient/family teaching  - Consider wound care consult   Outcome: Progressing     Problem: Nutrition/Hydration-ADULT  Goal: Nutrient/Hydration intake appropriate for improving, restoring or maintaining nutritional needs  Description: Monitor and assess patient's nutrition/hydration status for malnutrition. Collaborate with interdisciplinary team and initiate plan and interventions as ordered. Monitor patient's weight and dietary intake as ordered or per policy. Utilize nutrition screening tool and intervene as necessary. Determine patient's food preferences and provide high-protein, high-caloric foods as appropriate.      INTERVENTIONS:  - Monitor oral intake, urinary output, labs, and treatment plans  - Assess nutrition and hydration status and recommend course of action  - Evaluate amount of meals eaten  - Assist patient with eating if necessary   - Allow adequate time for meals  - Recommend/ encourage appropriate diets, oral nutritional supplements, and vitamin/mineral supplements  - Order, calculate, and assess calorie counts as needed  - Recommend, monitor, and adjust tube feedings and TPN/PPN based on assessed needs  - Assess need for intravenous fluids  - Provide specific nutrition/hydration education as appropriate  - Include patient/family/caregiver in decisions related to nutrition  Outcome: Progressing     Problem: PAIN - ADULT  Goal: Verbalizes/displays adequate comfort level or baseline comfort level  Description: Interventions:  - Encourage patient to monitor pain and request assistance  - Assess pain using appropriate pain scale  - Administer analgesics based on type and severity of pain and evaluate response  - Implement non-pharmacological measures as appropriate and evaluate response  - Consider cultural and social influences on pain and pain management  - Notify physician/advanced practitioner if interventions unsuccessful or patient reports new pain  Outcome: Progressing     Problem: INFECTION - ADULT  Goal: Absence or prevention of progression during hospitalization  Description: INTERVENTIONS:  - Assess and monitor for signs and symptoms of infection  - Monitor lab/diagnostic results  - Monitor all insertion sites, i.e. indwelling lines, tubes, and drains  - Monitor endotracheal if appropriate and nasal secretions for changes in amount and color  - Independence appropriate cooling/warming therapies per order  - Administer medications as ordered  - Instruct and encourage patient and family to use good hand hygiene technique  - Identify and instruct in appropriate isolation precautions for identified infection/condition  Outcome: Progressing  Goal: Absence of fever/infection during neutropenic period  Description: INTERVENTIONS:  - Monitor WBC    Outcome: Progressing     Problem: SAFETY ADULT  Goal: Maintain or return to baseline ADL function  Description: INTERVENTIONS:  -  Assess patient's ability to carry out ADLs; assess patient's baseline for ADL function and identify physical deficits which impact ability to perform ADLs (bathing, care of mouth/teeth, toileting, grooming, dressing, etc.)  - Assess/evaluate cause of self-care deficits   - Assess range of motion  - Assess patient's mobility; develop plan if impaired  - Assess patient's need for assistive devices and provide as appropriate  - Encourage maximum independence but intervene and supervise when necessary  - Involve family in performance of ADLs  - Assess for home care needs following discharge   - Consider OT consult to assist with ADL evaluation and planning for discharge  - Provide patient education as appropriate  Outcome: Progressing  Goal: Maintains/Returns to pre admission functional level  Description: INTERVENTIONS:  - Perform BMAT or MOVE assessment daily.   - Set and communicate daily mobility goal to care team and patient/family/caregiver. - Collaborate with rehabilitation services on mobility goals if consulted  - Perform Range of Motion  times a day. - Reposition patient every  hours.   - Dangle patient  times a day  - Stand patient  times a day  - Ambulate patient  times a day  - Out of bed to chair  times a day   - Out of bed for meals  times a day  - Out of bed for toileting  - Record patient progress and toleration of activity level   Outcome: Progressing  Goal: Patient will remain free of falls  Description: INTERVENTIONS:  - Educate patient/family on patient safety including physical limitations  - Instruct patient to call for assistance with activity   - Consult OT/PT to assist with strengthening/mobility   - Keep Call bell within reach  - Keep bed low and locked with side rails adjusted as appropriate  - Keep care items and personal belongings within reach  - Initiate and maintain comfort rounds  - Make Fall Risk Sign visible to staff  - Offer Toileting every  Hours, in advance of need  - Initiate/Maintain alarm  - Obtain necessary fall risk management equipment:   - Apply yellow socks and bracelet for high fall risk patients  - Consider moving patient to room near nurses station  Outcome: Progressing     Problem: DISCHARGE PLANNING  Goal: Discharge to home or other facility with appropriate resources  Description: INTERVENTIONS:  - Identify barriers to discharge w/patient and caregiver  - Arrange for needed discharge resources and transportation as appropriate  - Identify discharge learning needs (meds, wound care, etc.)  - Arrange for interpretive services to assist at discharge as needed  - Refer to Case Management Department for coordinating discharge planning if the patient needs post-hospital services based on physician/advanced practitioner order or complex needs related to functional status, cognitive ability, or social support system  Outcome: Progressing     Problem: Knowledge Deficit  Goal: Patient/family/caregiver demonstrates understanding of disease process, treatment plan, medications, and discharge instructions  Description: Complete learning assessment and assess knowledge base.   Interventions:  - Provide teaching at level of understanding  - Provide teaching via preferred learning methods  Outcome: Progressing     Problem: RESPIRATORY - ADULT  Goal: Achieves optimal ventilation and oxygenation  Description: INTERVENTIONS:  - Assess for changes in respiratory status  - Assess for changes in mentation and behavior  - Position to facilitate oxygenation and minimize respiratory effort  - Oxygen administered by appropriate delivery if ordered  - Initiate smoking cessation education as indicated  - Encourage broncho-pulmonary hygiene including cough, deep breathe, Incentive Spirometry  - Assess the need for suctioning and aspirate as needed  - Assess and instruct to report SOB or any respiratory difficulty  - Respiratory Therapy support as indicated  Outcome: Progressing     Problem: GENITOURINARY - ADULT  Goal: Maintains or returns to baseline urinary function  Description: INTERVENTIONS:  - Assess urinary function  - Encourage oral fluids to ensure adequate hydration if ordered  - Administer IV fluids as ordered to ensure adequate hydration  - Administer ordered medications as needed  - Offer frequent toileting  - Follow urinary retention protocol if ordered  Outcome: Progressing  Goal: Absence of urinary retention  Description: INTERVENTIONS:  - Assess patient’s ability to void and empty bladder  - Monitor I/O  - Bladder scan as needed  - Discuss with physician/AP medications to alleviate retention as needed  - Discuss catheterization for long term situations as appropriate  Outcome: Progressing     Problem: METABOLIC, FLUID AND ELECTROLYTES - ADULT  Goal: Electrolytes maintained within normal limits  Description: INTERVENTIONS:  - Monitor labs and assess patient for signs and symptoms of electrolyte imbalances  - Administer electrolyte replacement as ordered  - Monitor response to electrolyte replacements, including repeat lab results as appropriate  - Instruct patient on fluid and nutrition as appropriate  Outcome: Progressing  Goal: Fluid balance maintained  Description: INTERVENTIONS:  - Monitor labs   - Monitor I/O and WT  - Instruct patient on fluid and nutrition as appropriate  - Assess for signs & symptoms of volume excess or deficit  Outcome: Progressing  Goal: Glucose maintained within target range  Description: INTERVENTIONS:  - Monitor Blood Glucose as ordered  - Assess for signs and symptoms of hyperglycemia and hypoglycemia  - Administer ordered medications to maintain glucose within target range  - Assess nutritional intake and initiate nutrition service referral as needed  Outcome: Progressing

## 2023-11-13 NOTE — ASSESSMENT & PLAN NOTE
Lab Results   Component Value Date    CHOLESTEROL 118 10/09/2023    HDL 14 (L) 10/09/2023    TRIG 144 10/09/2023    [address] [facility] [address] 10/09/2023     Continue outpatient diet and lifestyle modification.

## 2023-11-13 NOTE — PHYSICAL THERAPY NOTE
Physical Therapy Treatment Note    Patient's Name: Baudilio Woody  : 1955     11/13/23 5403   Note Type   Note Type Treatment   Pain Assessment   Pain Assessment Tool 0-10   Pain Score 8   Pain Location/Orientation Location: Generalized   Restrictions/Precautions   Weight Bearing Precautions Per Order No   Other Precautions Chair Alarm; Bed Alarm;O2;Fall Risk  (trach to HF, 100%)   General   Chart Reviewed Yes   Response to Previous Treatment Patient reporting fatigue but able to participate. Family/Caregiver Present No   Cognition   Orientation Level Oriented X4   Following Commands Follows one step commands with increased time or repetition   Comments pt ID by wristband, name and    Subjective   Subjective pt in left sidelying, agreeable to PT treatment   Bed Mobility   Supine to Sit 5  Supervision   Additional items Increased time required;Assist x 1   Sit to Supine 5  Supervision   Additional items Assist x 1; Increased time required;LE management   Additional Comments pt sits EOB with supervision, denies light headedness/dizziness with bed mobility   Transfers   Sit to Stand 5  Supervision   Additional items Assist x 1; Increased time required;Verbal cues   Stand to Sit 4  Minimal assistance   Additional items Assist x 1; Increased time required;Verbal cues   Additional Comments x14 total STS performed throughout session, when fatigued, pt requires vc for hand placement and increased eccentric control/lowering to surface. When fatigued pt displays poor control utilizing momentum to assist with sitting. Pt HR to 115-120 with STS training   Ambulation/Elevation   Gait pattern Improper Weight shift; Wide BETHANY; Foward flexed; Short stride   Gait Assistance 4  Minimal assist   Additional items Assist x 1; Tactile cues; Verbal cues   Assistive Device 4-wheeled walker   Distance 10'x1, 15'x2   Ambulation/Elevation Additional Comments HRs to 120-125 post ambulation, pt with ALVES, requires seated rest of 3-4 minutes between trials for HR to return to low 100s, pt stating her legs feel weak today   Balance   Static Sitting Fair +   Dynamic Sitting Fair   Static Standing Fair -   Dynamic Standing Poor +   Ambulatory Poor  (RW)   Activity Tolerance   Activity Tolerance Patient limited by fatigue   Medical Staff José Luis Perez present during part of treatment   Nurse Made Aware TINO Cobian pre/post   Exercises   Balance training  reviewed HEP handout   Assessment   Prognosis Fair   Problem List Decreased strength;Decreased endurance;Decreased mobility; Impaired balance;Decreased cognition;Decreased safety awareness; Impaired judgement;Obesity; Decreased skin integrity;Pain   Assessment Pt seen for follow up PT treatment. Emphasis on OOB mobility, LE strenghtening and endurance as well as increased ambulation this session, in order to return pt to improved level of function. Pt fatigued and lethargic throughout session today. Does not impact performance of exercise or gait however. Pt demonstrates slight improvements in ambulatory distances this session,  does require vc for RW management. Pt  performs x5, x3, x2 STS to address lower extremity strength and endurance deficits. Pt requires seated rest between attempts, again stating just overall feeling weak and tired. Encouraged pt to continue to mobilize with nursing staff, perform written HEP when she is feeling good. Pt stated understanding and was agreeable. Pt would continue to benefit from skilled PT to address functional deficits and return to improved level of function. Would continue to reccomend progression of OOB mobility as well as LE strengthening and endurance. Continue to reccomend a level II disposition. Goals   Patient Goals to feel better   STG Expiration Date 11/16/23   Short Term Goal #1 In 10-14 days pt will be able to: 1.  Demonstrate ability to perform all aspects of bed mobility independently to improve functional safety. 2. Perform functional transfers independently to facilitate safe return to previous living environment. 3. Ambulate 50 ft with LRAD and supervision with stable vitals to improve safety with household distances and reduce fall risk. 4. Improve LE strength grades by 1 to increase ease of functional mobility with transfers and gait. 5. Pt will demonstrate improved balance by one grade in order to decrease risk of falls. 6. Tolerate 3 hours OOB to promote improved activity tolerance. 7. Improve 30s STS to at least 10 repetitions to decrease risk of falls. PT Treatment Day 7   Plan   Treatment/Interventions Functional transfer training;LE strengthening/ROM; Elevations; Therapeutic exercise;Cognitive reorientation;Patient/family training;Equipment eval/education; Bed mobility;Gait training;Spoke to nursing;Spoke to case management;OT   Progress Slow progress, decreased activity tolerance   PT Frequency 2-3x/wk   Discharge Recommendation   Rehab Resource Intensity Level, PT II (Moderate Resource Intensity)   Equipment Recommended Walker   Walker Package Recommended Wheeled walker   Change/add to [address]? No   AM-PAC Basic Mobility Inpatient   Turning in Flat Bed Without Bedrails 3   Lying on Back to Sitting on Edge of Flat Bed Without Bedrails 2   Moving Bed to Chair 3   Standing Up From Chair Using Arms 3   Walk in Room 2   Climb 3-5 Stairs With Railing 1   Basic Mobility Inpatient Raw Score 14   Basic Mobility Standardized Score 35.55   Highest Level Of Mobility   -HLM Goal 4: Move to chair/commode   JH-HLM Achieved 6: Walk 10 steps or more   Education   Education Provided Mobility training;Home exercise program   Patient Explanation/teachback used   End of Consult   Patient Position at End of Consult Supine; All needs within reach   The patient's AM-PAC Basic Mobility Inpatient Short Form Raw Score is 14.  A Raw score of less than or equal to 16 suggests the patient may benefit from discharge to post-acute rehabilitation services. Please also refer to the recommendation of the Physical Therapist for safe discharge planning.     Ronnie Hazel, PT

## 2023-11-13 NOTE — PLAN OF CARE
Problem: PHYSICAL THERAPY ADULT  Goal: Performs mobility at highest level of function for planned discharge setting. See evaluation for individualized goals. Description: Treatment/Interventions: Functional transfer training, LE strengthening/ROM, Elevations, Therapeutic exercise, Endurance training, Patient/family training, Equipment eval/education, Bed mobility, Gait training, Spoke to nursing, Spoke to case management          See flowsheet documentation for full assessment, interventions and recommendations. Outcome: Progressing  Note: Prognosis: Fair  Problem List: Decreased strength, Decreased endurance, Decreased mobility, Impaired balance, Decreased cognition, Decreased safety awareness, Impaired judgement, Obesity, Decreased skin integrity, Pain  Assessment: Pt seen for follow up PT treatment. Emphasis on OOB mobility, LE strenghtening and endurance as well as increased ambulation this session, in order to return pt to improved level of function. Pt fatigued and lethargic throughout session today. Does not impact performance of exercise or gait however. Pt demonstrates slight improvements in ambulatory distances this session,  does require vc for RW management. Pt  performs x5, x3, x2 STS to address lower extremity strength and endurance deficits. Pt requires seated rest between attempts, again stating just overall feeling weak and tired. Encouraged pt to continue to mobilize with nursing staff, perform written HEP when she is feeling good. Pt stated understanding and was agreeable. Pt would continue to benefit from skilled PT to address functional deficits and return to improved level of function. Would continue to reccomend progression of OOB mobility as well as LE strengthening and endurance. Continue to reccomend a level II disposition.   Barriers to Discharge: Inaccessible home environment, Decreased caregiver support (Pt is at home alone during the day; + PAUL her home)     Rehab Resource Intensity Level, PT: II (Moderate Resource Intensity)    See flowsheet documentation for full assessment.

## 2023-11-13 NOTE — PROGRESS NOTES
[address] [facility]  Progress Note  Name: Sameer Brown  MRN: [id number]  Unit/Bed#: ICU 03 I Date of Admission: 9/13/2023   Date of Service: 11/13/2023 I Hospital Day: 64    Assessment/Plan   * Acute respiratory failure with hypoxia secondary to oropharyngeal mass  Assessment & Plan  Cuffless trach placed 9/17, transitioned to cuffed on 10/3. Oxygen requirements not improving. For mental health patient is on buspirone, quetiapine, and sertraline. For pain, gabapentin, oxycodone scheduled and prn, prn oxycodone mainly utilized for increased pain during chemo and after a bronch. On Guaifenesin, Atrovent and Xopenex for airway maintenance. No improvement in bilateral consolidation or atelectasis and groundglass opacities on repeat CT and chest x-rays. Bronchial cx with 3 colonies CoNS. CTCAP 10/12:  indicates additional groundglass opacity with paraseptal emphysema, which may be contributing to inability to remain off vent. Bronchoscopy performed and unremarkable. Samples sent to lab for culture. Patient was placed back on vent s/p procedure. Now on high flow. Completed 7 day course of cefepime and Vancomycin. Bronchial culture and gram stain negative. Micro negative for CMV, AFB, Fungitell, Pneumocystis jiroveci (carinii), Histoplasma, Aspergillus. Trach secretions sent for sputum culture shows 2+ polys, 1+ gram-positive cocci in pairs, chains and clusters and 1+ gram-negative rods. Cuffless trach was placed 9/17. Replaced with a cuffed Shiley XLT #7 10/3    Plan:  Likely Chemo induced pneumonitis. Continue inhalation treatment with Pulmicort q12, duonebs QID, and symbicort q12. Continue trach collar during the day and at night. Continue her on dysphagia 2 diet. Wean down HFNC to a goal FiO2 of 50% to be discharged  Palliative on board to discuss goals of care. PT/OT. Family meeting today at [address] [place] [address] with patient, her daughter, son, , CM and myself.  Patient has made it clear she wants to continue to have chemotherapy. Large cell lymphoma ([address])  Assessment & Plan  Large B-cell lymphoma, stage II. First chemo cycle 9/22 4 days of R-EPOCH. 9/27 Rituxan. 9/28 Filgrastim. Received nivestym 300 mc 7 days dcd on 10/26      Prophylaxis:  Bactrim prophylaxis Monday Wednesday Friday,  Acyclovir 400 mg twice daily  Fluconazole 200 mg daily  Levofloxacin 250 mg daily  Lovenox 40 mg daily    9/22  4 days of R-EPOCH.  9/27 Rituxan  10/13 for [facility] cycle 2, which was done over 4 days and cytoxan on 10/19.  11/6 completed R-EPOCH cycle 3 and Cytoxan 11/7. Plan:  Oncology is following. Plan for repeat PET-CT on hospital discharge to evaluate disease response. Transfuse blood products as needed. Keep Hgb>7 and Plt>20 for chemo   See acute respiratory failure above    Oropharyngeal mass  Assessment & Plan  9/14 Neck/ soft tissue CT: Large enhancing soft tissue mass identified within the left neck involving multiple spaces. Centered within the left oropharynx with extensions as described above into multiple spaces. This results in significant airway compromise. 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 Small level 3/level 4 nodes are seen within the neck. Tracheostomy by ENT. Replaced on 9/26. H/o tobacco abuse, daily smoker. On nicotine while inpatient, confirmed with patient she needs nicotine patch. CT soft tissue neck shows reduced mass effect from 9/14 to 10/13. Can consider reducing trach size if continues to improve. Patient often refusing peg tube feeds but is feeding herself small meals orally. Plan:  ENT and heme onc following  As per speech therapist diet  Dysphagia 2. Continue parallel PEG tube feeds for nutrition. See acute respiratory failure and large B cell lymphoma above. Pancytopenia due to chemotherapy [facility])  Assessment & Plan  Patient received 1 PRBC transfusion on 10/5 and 10/25. Her B/L hemoglobin is 12-14. No sites of bleeding, no black stools.    Iron panel indicative of inflammatory anemia. Normal Vitamin B12 levels. Folate is low 5.5.   11/12 Hb is 6.0    Plan:  Transfuse 1 PRBC leukoreduced and irradiated. Repeat H and H post transfusion. Trend hemoglobin and transfuse for <7. As per heme/onc transfuse irradiated and leukoreduced blood products. Trend platelets  Folic acid 1mg daily supplementation  As per my conversation with Heme/oncology attending, patient is expected to have chemotherapy associated pancytopenia. No further anemia w/u required. Hyperkalemia-resolved as of 11/10/2023  Assessment & Plan  Recent Labs     11/12/23  0448 11/13/23  0507   K 3.5 3.5   MG 1.9 1.8*     Workup:  Etiology: Patient takes bactrim. Tumor lysis would be less likely since calcium is elevated rather than decreased. Uric acid is normal.   K+ elevated but returned to normal after medical management. Will give another round of medical management if elevated again. Plan:  Trend potassium on daily BMP. PO (acute kidney injury) (HCC)-resolved as of 9/21/2023  Assessment & Plan  Lab Results   Component Value Date    CREATININE 0.99 11/13/2023    CREATININE 1.24 11/12/2023    CREATININE 1.04 11/10/2023     Likely prerenal.  Second to poor oral intake versus poor urine output. Baseline creatinine 1 to 1.2. Cr at baseline  Plan:  Urinary retention protocol, Daily weights, I/O  Trend Cr daily    RML pneumonia-resolved as of 9/22/2023  Assessment & Plan  9/13 CT PE study: Patchy consolidation right middle lobe, new from 8/25/2023, compatible with pneumonia. No leukocytosis, no fever/chills. Positive for sore throat, cough. New onset pneumonia after recent hospitalization and antibiotics treatment. 9/14: Bronchoscopy/ ABL. MRSA negative. ABL revealed 2+ Growth of Candida albicans, suspected contaminant.      Blister (nonthermal) of left forearm, sequela  Assessment & Plan  Blisters of left upper extremity healing, no signs of infection, continue daily wound care.     Generalized abdominal pain  Assessment & Plan  Patient reports abdominal pain which is unchanged since 9/22 no guarding on exam. Takes Famotidine for GERD at home. Alk phos 10/2 145, 10/20 79. CTCAP reveals complex right upper pole renal lesion requiring possible outpatient MRI    Continue Famotidine  On bowel regimen with Senna and Miralax prn    (HFpEF) heart failure with preserved ejection fraction [facility])  Assessment & Plan  Wt Readings from Last 3 Encounters:   11/08/23 79.5 kg (175 lb 4.3 oz)   09/07/23 74.6 kg (164 lb 6.4 oz)   08/30/23 73.3 kg (161 lb 9.6 oz)     Lab Results   Component Value Date    LVEF 60 08/28/2023     (H) 10/28/2023     (H) 09/29/2023     Echo 8/28/23: LVEF 60%. Plan:  K > 4   Measure I/O      Ambulatory dysfunction  Assessment & Plan  2/2 Impacted by chronic pain syndrome + prolonged hospital stay. Continue OOB with PT. PT rec post acute rehab however reportedly Cannot get LTACH placement while getting chemo per CM  She is aware STR SNFs continue to follow and treatment can be done from a SNF level of care. PT would need to be on trach collar for at least 72 hours with no need for the vent. Aware that pt would need to be around 28% FiO2     Depression  Assessment & Plan  Patient on Zoloft 75 daily and Seroquel hs  Patient is depressed, multiple family/palliative discussions. patient is firm that she wants to live and to fight, she is just tired. Currently goal is disease treatment oriented. Full code. No SI/HI ideations. Palliative signed off, will reconsult if patient changes her mind regarding wishes. Chronic Superficial thrombophlebitis  Assessment & Plan  Right forearm soreness. RUE US: No acute or chronic deep vein thrombosis. 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 PO not severe enough to require renal dosing at this time, will continue to monitor and adjust dosing as needed.       Continue DVT ppx with Lovenox 40    CKD (chronic kidney disease) stage 3, GFR 30-59 ml/min [facility])  Assessment & Plan  Lab Results   Component Value Date    EGFR 57 11/13/2023    EGFR 44 11/12/2023    EGFR 54 11/10/2023    CREATININE 0.99 11/13/2023    CREATININE 1.24 11/12/2023    CREATININE 1.04 11/10/2023     Baseline Cr between 0.75-1.1  Initial HERIBERTO felt 2/2 prerenal azotemia 2/2 diuresis, reduced PO intake +/- ATN. Patient received 2 doses of Bumex IV 2 g as she had not been responding appropriately to IV 40 Lasix daily. Creatinine went up by 0.5 meeting criteria for an HERIBERTO. Suspect most likely due to medications as a combination of prerenal and intrinsic. FENa was 0.2%, UA shows no casts or signs of infection, urine eosinophils 0%. Patient likely has prerenal HERIBERTO from volume depletion. Received 2 L NS and 1 pRBC and cr went up by 0.07 still and Na and Cl went down, suspect that volume prevented further cr rise and free water excretion impaired by kidney function, allowing hyponatremia to increase. Suspect that now that nephrotoxic medications have been stopped she responded well to Lasix and creatinine downtrended. HERIBERTO now resolved. Will be cautious about nephrotoxins and monitor as restarting EPOCH and ppx. Patient gets volume depleted and overloaded very easily. Especially from chemo. 10/19 Heriberto as cr went up by 0.3 in 48 hours from 0.82 on 10/17 to 1.22 on 10/19. Suspect this is prerenal hypovolemic, will see if patient improves with fluids. She gets overloaded easily so if no improvement suspect CRS again. 10/29 HERIBERTO cr went up by 0.3 again. She has 922 ml UOP in last 24 hours. HERIBERTO likely from lasix 40mg IV given yesterday. She is not hypovolemic. Plan:   Continue to monitor UO/renal function. Avoid nephrotoxic agents  Continue to monitor a.m. BMP. Pain syndrome, chronic  Assessment & Plan  Home regimen: Oxycodone 5 mg BID, Tylenol 650 mg q8 prn. Gabapentin 600 mg TID. Medical marijuana.    Lumbar radiculopathy and impaired ambulatory dysfunction secondary to pain. Has bowel regimen and is having bowel movements daily. Patient required additional pain management during previous cycle and has some additional pain from tunneled line placement    Plan:  Continue gabapentin 300 mg TID, oxycodone 5 mg TID, prn Tylenol 650 mg q6. Oxycodone 5mg every 8 hours  Oxycodone 2.5 mg q6 PRN    Benign essential hypertension  Assessment & Plan  Home regimen Coreg 12.5 mg BID, Amlodipine 10 mg daily, and lisinopril 40 mg. All discontinued at this time secondary to hypotension and PO since cycle 1. but stable currently without any antihypertensives. Systolic persistently elevated and tachycardic, potentially secondary to pain. Patient was more hypotensive during last chemo. Coreg contraindicated during chemo, since cycles are every other week, would be better to stick with lopressor during duration of therapy as opposed to switching constantly. Plan:  Monitor vitals. Continue Norvasc 10 and lopressor 25 bid  Prn hydralazine for SBP > 160    Angioedema-resolved as of 11/4/2023  Assessment & Plan  POA in [facility] ED: Swollen tongue, soft and hard palate with severe angioedema. Decadron 10 mg, Benadryl 25 mg given. 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 Plan:  Cuffless trach 9/17, cuffed Shiley XLT #7 10/3  See acute respiratory failure and oropharyngeal mass above    Hyperlipidemia-resolved as of 10/26/2023  Assessment & Plan  Lab Results   Component Value Date    CHOLESTEROL 118 10/09/2023    HDL 14 (L) 10/09/2023    TRIG 144 10/09/2023    [facility] 104 10/09/2023     Continue outpatient diet and lifestyle modification. COPD without exacerbation (HCC)-resolved as of 9/26/2023  Assessment & Plan  Continue vent with nebs. Encephalopathy-resolved as of 9/21/2023  Assessment & Plan  9/19- Pt appeared to be AAO x3. Improving.              ICU Core Measures     Vented Patient  VAP Bundle  VAP bundle ordered      A: Assess, Prevent, and Manage Pain Has pain been assessed? Yes  Need for changes to pain regimen? No   B: Both Spontaneous Awakening Trials (SATs) and Spontaneous Breathing Trials (SBTs) Plan to perform spontaneous awakening trial today? N/A       C: Choice of Sedation RASS Goal: 0 Alert and Calm  Need for changes to sedation or analgesia regimen? No   D: Delirium CAM-ICU: Negative   E: Early Mobility  Plan for early mobility? Yes   F: Family Engagement Plan for family engagement today? Yes      Antibiotic Review: Patient on appropriate coverage based on culture data. Review of Invasive Devices:        Central access plan: Medications requiring central line      Prophylaxis:  VTE VTE covered by:  enoxaparin, Subcutaneous, 40 mg at 11/13/23 0806       Stress Ulcer  covered byfamotidine (PEPCID) tablet 20 mg [[id number]]       Subjective   HPI/24hr events: Patient has been denying tube feeds. Allows for suctioning intermittently. She has been on 15L at 100% overnight. Desaturates when she lies down. Review of systems deferred at this time as patient did not want to speak with me. Objective                            Vitals I/O      Most Recent Min/Max in 24hrs   Temp 98.5 °F (36.9 °C) Temp  Min: 97.6 °F (36.4 °C)  Max: 98.9 °F (37.2 °C)   Pulse 89 Pulse  Min: 79  Max: 98   Resp 16 Resp  Min: 13  Max: 41   /71 BP  Min: 95/54  Max: 126/68   O2 Sat 99 % SpO2  Min: 87 %  Max: 100 %      Intake/Output Summary (Last 24 hours) at 11/13/2023 0816  Last data filed at 11/13/2023 0600  Gross per 24 hour   Intake 435 ml   Output 800 ml   Net -365 ml         Diet Dysphagia/Modified Consistency; Dysphagia 2-Mechanical Soft; Honey Thick Liquid     Invasive Monitoring Physical exam    Physical Exam  Eyes:      Extraocular Movements: Extraocular movements intact. Pupils: Pupils are equal, round, and reactive to light. Skin:     General: Skin is warm and dry. HENT:      Head: Normocephalic and atraumatic. Cardiovascular:      Rate and Rhythm: Normal rate and regular rhythm. Musculoskeletal:         General: Normal range of motion. Abdominal:      Tenderness: There is no abdominal tenderness. Constitutional:       General: She is not in acute distress. Appearance: She is well-developed. She is not ill-appearing. Pulmonary:      Effort: Pulmonary effort is normal.      Breath sounds: Normal breath sounds. No stridor. No wheezing, rhonchi or rales. Chest:      Chest wall: No tenderness. Neurological:      General: No focal deficit present. Mental Status: She is alert and oriented to person, place and time. Mental status is at baseline. Vitals and nursing note reviewed.             Diagnostic Studies      EKG: NSR  Imaging: N/A      Medications:  Scheduled PRN   acyclovir, 400 mg, BID  amLODIPine, 10 mg, Daily  budesonide, 0.5 mg, Q12H  busPIRone, 30 mg, TID  chlorhexidine, 15 mL, Q12H ASHLEY  enoxaparin, 40 mg, Q24H ASHLEY  famotidine, 20 mg, BID  fluconazole, 869 mg, Daily  folic acid, 1 mg, Daily  formoterol, 20 mcg, Q12H  furosemide, 40 mg, BID (diuretic)  gabapentin, 300 mg, TID  levalbuterol, 1.25 mg, Q6H   And  ipratropium, 0.5 mg, Q6H  magnesium sulfate, 2 g, Once  melatonin, 3 mg, HS  metoprolol tartrate, 25 mg, Q12H ASHLEY  nicotine, 7 mg, Daily  oxyCODONE, 5 mg, Q8H  QUEtiapine, 50 mg, HS  senna, 8.8 mg, HS  sertraline, 75 mg, Daily  sodium chloride, 4 mL, TID  sulfamethoxazole-trimethoprim, 1 tablet, Once per day on Mon Wed Fri      alteplase, 2 mg, Q1MIN PRN  alteplase, 2 mg, Q1MIN PRN  alteplase, 2 mg, Q1MIN PRN  ondansetron, 4 mg, Q8H PRN  oxyCODONE, 2.5 mg, Q6H PRN       Continuous          Labs:    CBC    Recent Labs     11/13/23  0507 11/13/23  0705   WBC 0.96* 0.87*   HGB 7.2* 7.3*   HCT 23.3* 22.7*    176   BANDSPCT 1  --      BMP    Recent Labs     11/12/23  0448 11/13/23  0507   SODIUM 145 146   K 3.5 3.5    108   CO2 32 33*   AGAP 6 5   BUN 42* 31*   CREATININE 1.24 0.99 CALCIUM 9.5 9.6       Coags    No recent results     Additional Electrolytes  Recent Labs     11/12/23 0448 11/13/23  0507   MG 1.9 1.8*   PHOS 3.9 3.5   CAIONIZED 1.25 1.24          Blood Gas    No recent results  No recent results LFTs  No recent results    Infectious  No recent results  Glucose  Recent Labs     11/12/23 0448 11/13/23  0507   GLUC 105 95                 Jose Alfredo Sosa MD

## 2023-11-13 NOTE — ASSESSMENT & PLAN NOTE
POA in [facility] ED: Swollen tongue, soft and hard palate with severe angioedema. Decadron 10 mg, Benadryl 25 mg given. 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     Plan:  Cuffless trach 9/17, cuffed Shiley XLT #7 10/3  See acute respiratory failure and oropharyngeal mass above

## 2023-11-13 NOTE — ASSESSMENT & PLAN NOTE
Large B-cell lymphoma, stage II. First chemo cycle 9/22 4 days of R-EPOCH. 9/27 Rituxan. 9/28 Filgrastim. Received nivestym 300 mc 7 days dcd on 10/26      Prophylaxis:  Bactrim prophylaxis Monday Wednesday Friday,  Acyclovir 400 mg twice daily  Fluconazole 200 mg daily  Levofloxacin 250 mg daily  Lovenox 40 mg daily    9/22  4 days of R-EPOCH.  9/27 Rituxan  10/13 for [facility] cycle 2, which was done over 4 days and cytoxan on 10/19.  11/6 completed R-EPOCH cycle 3 and Cytoxan 11/7. Plan:  Oncology is following. Plan for repeat PET-CT on hospital discharge to evaluate disease response. Transfuse blood products as needed.   Keep Hgb>7 and Plt>20 for chemo   See acute respiratory failure above

## 2023-11-13 NOTE — ASSESSMENT & PLAN NOTE
Cuffless trach placed 9/17, transitioned to cuffed on 10/3. Oxygen requirements not improving. For mental health patient is on buspirone, quetiapine, and sertraline. For pain, gabapentin, oxycodone scheduled and prn, prn oxycodone mainly utilized for increased pain during chemo and after a bronch. On Guaifenesin, Atrovent and Xopenex for airway maintenance. No improvement in bilateral consolidation or atelectasis and groundglass opacities on repeat CT and chest x-rays. Bronchial cx with 3 colonies CoNS. CTCAP 10/12:  indicates additional groundglass opacity with paraseptal emphysema, which may be contributing to inability to remain off vent. Bronchoscopy performed and unremarkable. Samples sent to lab for culture. Patient was placed back on vent s/p procedure. Now on high flow. Completed 7 day course of cefepime and Vancomycin. Bronchial culture and gram stain negative. Micro negative for CMV, AFB, Fungitell, Pneumocystis jiroveci (carinii), Histoplasma, Aspergillus. Trach secretions sent for sputum culture shows 2+ polys, 1+ gram-positive cocci in pairs, chains and clusters and 1+ gram-negative rods. Cuffless trach was placed 9/17. Replaced with a cuffed Shiley XLT #7 10/3    Plan:  Likely Chemo induced pneumonitis. Continue inhalation treatment with Pulmicort q12, duonebs QID, and symbicort q12. Continue trach collar during the day and at night. Continue her on dysphagia 2 diet. Wean down HFNC to a goal FiO2 of 50% to be discharged  Palliative on board to discuss goals of care. PT/OT. Family meeting today at [address] [place] [address] with patient, her daughter, son, , CM and myself. Patient has made it clear she wants to continue to have chemotherapy.

## 2023-11-14 ENCOUNTER — HOME HEALTH ADMISSION (OUTPATIENT)
Dept: HOME HEALTH SERVICES | Facility: HOME HEALTHCARE | Age: 68
End: 2023-11-14
Payer: COMMERCIAL

## 2023-11-14 ENCOUNTER — APPOINTMENT (INPATIENT)
Dept: RADIOLOGY | Facility: HOSPITAL | Age: 68
DRG: 004 | End: 2023-11-14
Payer: COMMERCIAL

## 2023-11-14 PROBLEM — E46 PROTEIN-CALORIE MALNUTRITION (HCC): Status: ACTIVE | Noted: 2023-11-14

## 2023-11-14 LAB
ANION GAP SERPL CALCULATED.3IONS-SCNC: 3 MMOL/L
ANISOCYTOSIS BLD QL SMEAR: PRESENT
BASOPHILS # BLD MANUAL: 0 THOUSAND/UL (ref 0–0.1)
BASOPHILS NFR MAR MANUAL: 0 % (ref 0–1)
BUN SERPL-MCNC: 22 MG/DL (ref 5–25)
CALCIUM SERPL-MCNC: 9.6 MG/DL (ref 8.4–10.2)
CHLORIDE SERPL-SCNC: 107 MMOL/L (ref 96–108)
CO2 SERPL-SCNC: 34 MMOL/L (ref 21–32)
CREAT SERPL-MCNC: 1.01 MG/DL (ref 0.6–1.3)
EOSINOPHIL # BLD MANUAL: 0.04 THOUSAND/UL (ref 0–0.4)
EOSINOPHIL NFR BLD MANUAL: 3 % (ref 0–6)
ERYTHROCYTE [DISTWIDTH] IN BLOOD BY AUTOMATED COUNT: 15.8 % (ref 11.6–15.1)
GFR SERPL CREATININE-BSD FRML MDRD: 56 ML/MIN/1.73SQ M
GLUCOSE SERPL-MCNC: 105 MG/DL (ref 65–140)
HCT VFR BLD AUTO: 22.3 % (ref 34.8–46.1)
HGB BLD-MCNC: 7 G/DL (ref 11.5–15.4)
HYPERCHROMIA BLD QL SMEAR: PRESENT
LYMPHOCYTES # BLD AUTO: 0.64 THOUSAND/UL (ref 0.6–4.47)
LYMPHOCYTES # BLD AUTO: 54 % (ref 14–44)
MAGNESIUM SERPL-MCNC: 2.1 MG/DL (ref 1.9–2.7)
MCH RBC QN AUTO: 30.6 PG (ref 26.8–34.3)
MCHC RBC AUTO-ENTMCNC: 31.4 G/DL (ref 31.4–37.4)
MCV RBC AUTO: 97 FL (ref 82–98)
METAMYELOCYTES NFR BLD MANUAL: 1 % (ref 0–1)
MICROCYTES BLD QL AUTO: PRESENT
MONOCYTES # BLD AUTO: 0.06 THOUSAND/UL (ref 0–1.22)
MONOCYTES NFR BLD: 5 % (ref 4–12)
MYCOBACTERIUM SPEC CULT: NORMAL
NEUTROPHILS # BLD MANUAL: 0.44 THOUSAND/UL (ref 1.85–7.62)
NEUTS BAND NFR BLD MANUAL: 1 % (ref 0–8)
NEUTS SEG NFR BLD AUTO: 36 % (ref 43–75)
PHOSPHATE SERPL-MCNC: 3.7 MG/DL (ref 2.3–4.1)
PLATELET # BLD AUTO: 158 THOUSANDS/UL (ref 149–390)
PLATELET BLD QL SMEAR: ADEQUATE
PMV BLD AUTO: 9.4 FL (ref 8.9–12.7)
POLYCHROMASIA BLD QL SMEAR: PRESENT
POTASSIUM SERPL-SCNC: 3.4 MMOL/L (ref 3.5–5.3)
RBC # BLD AUTO: 2.29 MILLION/UL (ref 3.81–5.12)
RBC MORPH BLD: PRESENT
RHODAMINE-AURAMINE STN SPEC: NORMAL
SODIUM SERPL-SCNC: 144 MMOL/L (ref 135–147)
TOXIC GRANULES BLD QL SMEAR: PRESENT
WBC # BLD AUTO: 1.18 THOUSAND/UL (ref 4.31–10.16)

## 2023-11-14 PROCEDURE — 83735 ASSAY OF MAGNESIUM: CPT

## 2023-11-14 PROCEDURE — 85007 BL SMEAR W/DIFF WBC COUNT: CPT

## 2023-11-14 PROCEDURE — 74230 X-RAY XM SWLNG FUNCJ C+: CPT

## 2023-11-14 PROCEDURE — 99232 SBSQ HOSP IP/OBS MODERATE 35: CPT | Performed by: INTERNAL MEDICINE

## 2023-11-14 PROCEDURE — 94760 N-INVAS EAR/PLS OXIMETRY 1: CPT

## 2023-11-14 PROCEDURE — 85027 COMPLETE CBC AUTOMATED: CPT

## 2023-11-14 PROCEDURE — 92611 MOTION FLUOROSCOPY/SWALLOW: CPT

## 2023-11-14 PROCEDURE — 84100 ASSAY OF PHOSPHORUS: CPT

## 2023-11-14 PROCEDURE — 80048 BASIC METABOLIC PNL TOTAL CA: CPT

## 2023-11-14 PROCEDURE — 94640 AIRWAY INHALATION TREATMENT: CPT

## 2023-11-14 RX ORDER — POTASSIUM CHLORIDE 14.9 MG/ML
20 INJECTION INTRAVENOUS ONCE
Status: COMPLETED | OUTPATIENT
Start: 2023-11-14 | End: 2023-11-14

## 2023-11-14 RX ORDER — SENNOSIDES 8.8 MG/5ML
17.6 LIQUID ORAL 2 TIMES DAILY
Status: DISCONTINUED | OUTPATIENT
Start: 2023-11-14 | End: 2023-12-04 | Stop reason: HOSPADM

## 2023-11-14 RX ORDER — POTASSIUM CHLORIDE 20MEQ/15ML
40 LIQUID (ML) ORAL ONCE
Status: COMPLETED | OUTPATIENT
Start: 2023-11-14 | End: 2023-11-14

## 2023-11-14 RX ORDER — POTASSIUM CHLORIDE 20 MEQ/1
40 TABLET, EXTENDED RELEASE ORAL ONCE
Status: DISCONTINUED | OUTPATIENT
Start: 2023-11-14 | End: 2023-11-14

## 2023-11-14 RX ORDER — POLYETHYLENE GLYCOL 3350 17 G/17G
17 POWDER, FOR SOLUTION ORAL DAILY
Status: DISCONTINUED | OUTPATIENT
Start: 2023-11-14 | End: 2023-12-04 | Stop reason: HOSPADM

## 2023-11-14 RX ORDER — AMOXICILLIN 250 MG
1 CAPSULE ORAL 2 TIMES DAILY
Status: DISCONTINUED | OUTPATIENT
Start: 2023-11-14 | End: 2023-11-14

## 2023-11-14 RX ADMIN — FORMOTEROL FUMARATE DIHYDRATE 20 MCG: 20 SOLUTION RESPIRATORY (INHALATION) at 20:31

## 2023-11-14 RX ADMIN — FUROSEMIDE 40 MG: 10 INJECTION, SOLUTION INTRAMUSCULAR; INTRAVENOUS at 17:26

## 2023-11-14 RX ADMIN — MELATONIN 3 MG: at 21:00

## 2023-11-14 RX ADMIN — LEVALBUTEROL HYDROCHLORIDE 1.25 MG: 1.25 SOLUTION RESPIRATORY (INHALATION) at 20:31

## 2023-11-14 RX ADMIN — IPRATROPIUM BROMIDE 0.5 MG: 0.5 SOLUTION RESPIRATORY (INHALATION) at 03:01

## 2023-11-14 RX ADMIN — BUSPIRONE HYDROCHLORIDE 30 MG: 10 TABLET ORAL at 08:49

## 2023-11-14 RX ADMIN — BUDESONIDE 0.5 MG: 0.5 INHALANT ORAL at 20:31

## 2023-11-14 RX ADMIN — FOLIC ACID 1 MG: 1 TABLET ORAL at 08:45

## 2023-11-14 RX ADMIN — GABAPENTIN 300 MG: 300 CAPSULE ORAL at 17:25

## 2023-11-14 RX ADMIN — AMLODIPINE BESYLATE 10 MG: 5 TABLET ORAL at 08:43

## 2023-11-14 RX ADMIN — SODIUM CHLORIDE SOLN NEBU 3% 4 ML: 3 NEBU SOLN at 20:32

## 2023-11-14 RX ADMIN — SODIUM CHLORIDE SOLN NEBU 3% 4 ML: 3 NEBU SOLN at 07:16

## 2023-11-14 RX ADMIN — OXYCODONE HYDROCHLORIDE 5 MG: 5 SOLUTION ORAL at 03:47

## 2023-11-14 RX ADMIN — FILGRASTIM-AAFI 300 MCG: 300 INJECTION, SOLUTION SUBCUTANEOUS at 09:42

## 2023-11-14 RX ADMIN — OXYCODONE HYDROCHLORIDE 5 MG: 5 SOLUTION ORAL at 21:00

## 2023-11-14 RX ADMIN — BUSPIRONE HYDROCHLORIDE 30 MG: 10 TABLET ORAL at 17:25

## 2023-11-14 RX ADMIN — POTASSIUM CHLORIDE 40 MEQ: 1.5 SOLUTION ORAL at 09:10

## 2023-11-14 RX ADMIN — FLUCONAZOLE 400 MG: 100 TABLET ORAL at 08:49

## 2023-11-14 RX ADMIN — POTASSIUM CHLORIDE 20 MEQ: 14.9 INJECTION, SOLUTION INTRAVENOUS at 09:10

## 2023-11-14 RX ADMIN — FAMOTIDINE 20 MG: 20 TABLET, FILM COATED ORAL at 08:45

## 2023-11-14 RX ADMIN — LEVALBUTEROL HYDROCHLORIDE 1.25 MG: 1.25 SOLUTION RESPIRATORY (INHALATION) at 03:01

## 2023-11-14 RX ADMIN — METOPROLOL TARTRATE 25 MG: 25 TABLET, FILM COATED ORAL at 08:45

## 2023-11-14 RX ADMIN — QUETIAPINE FUMARATE 50 MG: 25 TABLET ORAL at 21:00

## 2023-11-14 RX ADMIN — ACYCLOVIR 400 MG: 200 CAPSULE ORAL at 09:42

## 2023-11-14 RX ADMIN — CHLORHEXIDINE GLUCONATE 15 ML: 1.2 SOLUTION ORAL at 08:43

## 2023-11-14 RX ADMIN — OXYCODONE HYDROCHLORIDE 5 MG: 5 SOLUTION ORAL at 11:35

## 2023-11-14 RX ADMIN — FORMOTEROL FUMARATE DIHYDRATE 20 MCG: 20 SOLUTION RESPIRATORY (INHALATION) at 08:35

## 2023-11-14 RX ADMIN — IPRATROPIUM BROMIDE 0.5 MG: 0.5 SOLUTION RESPIRATORY (INHALATION) at 20:31

## 2023-11-14 RX ADMIN — BUSPIRONE HYDROCHLORIDE 30 MG: 10 TABLET ORAL at 21:02

## 2023-11-14 RX ADMIN — FAMOTIDINE 20 MG: 20 TABLET, FILM COATED ORAL at 17:25

## 2023-11-14 RX ADMIN — BUDESONIDE 0.5 MG: 0.5 INHALANT ORAL at 07:16

## 2023-11-14 RX ADMIN — ENOXAPARIN SODIUM 40 MG: 40 INJECTION SUBCUTANEOUS at 08:43

## 2023-11-14 RX ADMIN — SERTRALINE 75 MG: 25 TABLET, FILM COATED ORAL at 08:48

## 2023-11-14 RX ADMIN — IPRATROPIUM BROMIDE 0.5 MG: 0.5 SOLUTION RESPIRATORY (INHALATION) at 07:16

## 2023-11-14 RX ADMIN — FUROSEMIDE 40 MG: 10 INJECTION, SOLUTION INTRAMUSCULAR; INTRAVENOUS at 08:49

## 2023-11-14 RX ADMIN — GABAPENTIN 300 MG: 300 CAPSULE ORAL at 21:00

## 2023-11-14 RX ADMIN — ACYCLOVIR 400 MG: 200 CAPSULE ORAL at 17:48

## 2023-11-14 RX ADMIN — NICOTINE 7 MG: 7 PATCH, EXTENDED RELEASE TRANSDERMAL at 08:50

## 2023-11-14 RX ADMIN — LEVALBUTEROL HYDROCHLORIDE 1.25 MG: 1.25 SOLUTION RESPIRATORY (INHALATION) at 07:16

## 2023-11-14 RX ADMIN — GABAPENTIN 300 MG: 300 CAPSULE ORAL at 08:45

## 2023-11-14 NOTE — CASE MANAGEMENT
Case Management Progress Note    Patient name Ju Jacome  Location ICU 03/ICU 03 MRN [id number]  : 1955 Date 2023       LOS (days): 62  Geometric Mean LOS (GMLOS) (days): 26.10  Days to GMLOS:-35.9        OBJECTIVE:    Current admission status: Inpatient  Preferred Pharmacy:   [id number] [place] - [address]. [address] [place] [address]  Phone: [phone] Fax: [phone] [employer] [facility] [facility] [place], [place] - [address] [address]  Phone: [phone] Fax: [phone]    Primary Care Provider: José Luis Tatum MD    Primary Insurance: Ren [facility]ame[facility]  Secondary Insurance: [facility]    PROGRESS NOTE:    SHIRA spoke with pt , Jose Borrego. SHIRA updated her on pt plan to return home. As per Jose Borrego she can work on increase of hours for [facility] support. She did confirm they are unable to assist with trach/PEG care at the home. Jose Borrego reports if pt requires an RN or LPN at home would need a letter of medical necessity from the physician indicating how much care pt would require and pt care needs. Carly then would present that to [facility] to see if this can be approved. As per Jose Borrego they do have the right to deny these services. Carly aware that SHIRA set up [facility] for pt, but they are there for family teaching, on call, assessment, and PT/OT int he home. Otherwise the care would fall to the pt's family. SHIRA did review that family would require teaching prior to pt being dc. Carly aware that SHIRA will also work with Karina Cloud [facility] to see if pt's O2 needs can be met at home. Carly aware pt is due for her next treatment next week and would require a 4 day IP Hospitalization with return home once stable. SHIRA did review that pt would likely require a medical transport given O2 needs. SHIRA spoke with pt daughter, Sonya Light, to clarify dcp.  SHIRA discussed understanding of the meeting is that pt wanted to return home with VNA, family support, and increase of HHA/request for nursing at home through her waiver. Karel Paula would like some clarification from the meeting yesterday. She reports after the meeting they thought pt could only go home if they agree to hospice services. Karel Paula did confirm pt does want to continue with Chemo and would like to return home with care at the house. SHIRA reviewed that CM is unsure if pt can return home safely. This will be determined by the medical team in coordination with pt needs at home - RT eval with [address] [place] [address], VNA set up, family availability, and Timur Stephens being able to get HHA/Nursing approved for pt to return home. Linnette aware CM will have medical team meet with her and discuss further. CM did inform her [facility] has accepted and that CM did speak with Timur Stephens. Karel Paula understanding this is a process for pt to return home. SHIRA spoke with [facility] Fellow, Dr. Kenyon Dawson. CM reviewed above information. Aware family needs clarification from the meeting yesterday. CM reviewed aware that [address] [place] [address] can re-evaluate pt in the next two day days (after discussion with AP) to see if pt's needs can be met at home with current level of O2 requirements. CM reviewed that pt is ambulatory with an assist of 1 with the pt. [facility]NA able to accept pt upon dc. SHIRA also reviewed that the waiver coordinator can see if pt can have HHA/Nursing support from waiver services. Dr. Kenyon Dawson will discuss further with the pt and family. Clarify any questions they may have. DCP will be pending pt medical status.

## 2023-11-14 NOTE — CASE MANAGEMENT
Case Management Progress Note    Patient name Ahmad Alpers  Location ICU 03/ICU 03 MRN [id number]  : 1955 Date 2023       LOS (days): 62  Geometric Mean LOS (GMLOS) (days): 26.10  Days to GMLOS:-35.7        OBJECTIVE:        Current admission status: Inpatient  Preferred Pharmacy:   [id number]- [place] - [address]. [address] [facility] [address]  Phone: [phone] Fax: [phone] [facility] [address] [address]  Phone: [phone] Fax: [phone]    Primary Care Provider: Bharti Norris MD    Primary Insurance: Nilane [place]  Secondary Insurance: [facility]    PROGRESS NOTE:     left  for pt , Kun Alanis, requesting return call to discuss a home plan for pt.

## 2023-11-14 NOTE — ASSESSMENT & PLAN NOTE
Patient received 1 PRBC transfusion on 10/5 and 10/25. Her B/L hemoglobin is 12-14. No sites of bleeding, no black stools. Iron panel indicative of inflammatory anemia. Normal Vitamin B12 levels. Folate is low 5.5. Low Hb 0.87. Filgrastim administered by heme/onc. Plan:  Trend hemoglobin and transfuse for <7. As per heme/onc transfuse irradiated and leukoreduced blood products. Trend platelets  Folic acid 1mg daily supplementation  As per my conversation with Heme/oncology attending, patient is expected to have chemotherapy associated pancytopenia. No further anemia w/u required.   Low ANC management as per heme/onc

## 2023-11-14 NOTE — QUICK NOTE
I was notified by CM that patient and daughter had several questions regarding yesterday's meeting and future plans to go home on chemotherapy. I explained to them that the current plan is for Ryland Chavis to be discharged home with oxygen support. She wishes to continue with chemotherapy treatments and understands that she will require hospital admissions for each chemotherapy cycle and she is agreeable with that. The current limiting step for safe discharge is patient's oxygen requirements, for this reason CM explained to them that there is an [address] [place] [address] representative that will be reaching out to them with the plan to estimate how much oxygen can be provided at home in order for a safe discharge for Ryland Chavis. Home VNA will also be set up prior to discharge. Patient and daughter are not interested in hospice. They want to continue to follow with palliative care as outpatient for symptom management. Next chemotherapy cycle is scheduled for 11/21, they expressed understanding that it will be best for Ryland Chavis to stay in the hospital until next cycle is completed. Ryland Chavis is hopeful to be home for the Christmas holidays. Addendum 5:15 pm: Discussed with oncology attending. Patient will need to get a PET/CT before the next cycle of chemotherapy can be scheduled. Unfortunately PET scans cannot be done while inpatient. She would need to be discharged prior to that. Will discuss with CC attending tomorrow. Oncology is also suggesting a CT of the neck to evaluate for improvement in the size of the mass.       Ravi Lechuga MD  Pulmonary and Critical Care Fellow, PGY-5  [facility]

## 2023-11-14 NOTE — ASSESSMENT & PLAN NOTE
POA in [place] ([place] ED: Swollen tongue, soft and hard palate with severe angioedema. Decadron 10 mg, Benadryl 25 mg given. 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     Plan:  Cuffless trach 9/17, cuffed Shiley XLT #7 10/3  See acute respiratory failure and oropharyngeal mass above

## 2023-11-14 NOTE — ASSESSMENT & PLAN NOTE
Lab Results   Component Value Date    CHOLESTEROL 118 10/09/2023    HDL 14 (L) 10/09/2023    TRIG 144 10/09/2023    [address] 104 10/09/2023     Continue outpatient diet and lifestyle modification.

## 2023-11-14 NOTE — ASSESSMENT & PLAN NOTE
Wt Readings from Last 3 Encounters:   11/14/23 79.5 kg (175 lb 4.3 oz)   09/07/23 74.6 kg (164 lb 6.4 oz)   08/30/23 73.3 kg (161 lb 9.6 oz)     Lab Results   Component Value Date    LVEF 60 08/28/2023     (H) 10/28/2023     (H) 09/29/2023     Echo 8/28/23: LVEF 60%.        Plan:  K > 4   Measure I/O

## 2023-11-14 NOTE — QUICK NOTE
We had a family meeting to initiate goals of care discussion for [facility]. Attended by her daughter Isaiah Olivera, her son, her , her daughter in law (nurse), myself, the patient, myself, Dr. Thelma Marshall and her  Alexus Luque. We discussed barriers to discharge to home and short tern rehab. Currently she her oxygen requirements are maintained during the daytime at 50-70% at 12L. She has at least 1 desaturating event when changing position or lying on her back in the last 4 nights, causing the oxygen to drop to 60s for a few minutes. She is raised up to 100% FiO2  which improves her oxygenation. Explained to the family that she has the option for short term rehab if her oxygen requirements permit her to stay at 50% continuously. Family expressed wishes to go home. Explained the option of hospice which includes withdrawing chemotherapy. At this time the patient does not appear optimal to go home due to higher oxygen requirements. Family shows understanding of it and would like to take home. They understand that the patient will require trach care and peg care at home. She may not get enough oxygen at home and there is a possibility that she will return to the hospital. Family has shown an understanding.

## 2023-11-14 NOTE — PROCEDURES
Video Swallow Study      Patient Name: Kemar Chávez  [facility]'S Date: 11/14/2023        Past Medical History  Past Medical History:   Diagnosis Date    Anxiety     Depression     Fatty liver     Hyperlipidemia     Hypertension     Psychiatric disorder     Varicella         General Information:   78 yo female referred for a VBS by Dr. Maggy Koo  for dysphagia w/  c/o acute respiratory failure w/ hypoxia 2* oropharyngeal mass. Previous speech notes for further background info. Cognition:  WNL    Speech/Swallow Mech: Oral motor movements appeared  WNL; Dentition was  U/L dentures; Cough was strong. Respiratory Status: 15L trach collar. PMV in place-good voicing;  Current diet: dysphagia 2 w/ honey thick liquids. Prior VBS 10/22/23: Pt presents w/ mod oropharyngeal dysphagia. Slowed lingual manipulation and transfer of boluses today. Delayed swallow initiation w/ bolus head reaching the pyriforms prior to reduced hyo-laryngeal elevation. Incomplete laryngeal vestibule closure results in recurrent penetration w/ thin and nectar thick liquids w/ subsequent epiglottic undercoating. Trace aspiration x1 w/ sequential sips of thin. Po aspiration x1 after the swallow w/ nectar thick, event was SILENT, though barium laden material noted at trach site. No penetration/aspiration of puree or honey thick liquids with or without PMV in place. Pt was seen in radiology for a Video Barium Swallow Study, seated in the upright position and viewed laterally with the following consistencies: puree, solids, honey thick liquids, nectar thick liquids, thin liquids, barium pill w/ water by cup. Results are as follows:     **Images are available for review on PACS        Oral Stage:   Pt  able to bite cookie, exhibited slow, prolonged but effective mastication. Bolus manipulation/ formation & transfer were slow; oral residue noted with solids > liquids.  Pt appeared w/ good oral control w/ liquids. Pharyngeal Stage:   Swallow initiation was delayed  with premature spill to pyriform sinuses, decreased tongue base retraction with mild residue noted along base of tongue w/ HTL. Decreased airway entrance closure noted; penetration with trace silent aspiration observed w/ thin liquids, but eliminated with chin tuck. No pyriform sinus retention noted. Esophageal Stage:   Briefly assessed. Delayed clearance from the distal esophagus, specifically the barium pill, which eventually cleared w/ liquid wash. Assessment Summary:   Improvement in swallowing since previous VBS, pt now presents w/ Functional to minimal dysphagia-> delayed swallow initiation and trace penetration/aspiration w/ thin liquids which was eliminated with chin tuck.               Recommendations:   Regular diet w/ thin liquids  Chin tuck when swallowing liquids  Aspiration precautions  Meds whole in [facility]   Will cont to follow for diet tolerance       April Marva Barrera MA CCC-SLP  Speech Pathologist  [place] license # [id number]  [place] license # [id number]  Available via [employer]

## 2023-11-14 NOTE — PROGRESS NOTES
[facility]  Progress Note  Name: Chiquis Merida  MRN: [id number]  Unit/Bed#: ICU 03 I Date of Admission: 9/13/2023   Date of Service: 11/14/2023 I Hospital Day: 58    Assessment/Plan   * Acute respiratory failure with hypoxia secondary to oropharyngeal mass  Assessment & Plan  Cuffless trach placed 9/17, transitioned to cuffed on 10/3. Oxygen requirements not improving. For mental health patient is on buspirone, quetiapine, and sertraline. For pain, gabapentin, oxycodone scheduled and prn, prn oxycodone mainly utilized for increased pain during chemo and after a bronch. On Guaifenesin, Atrovent and Xopenex for airway maintenance. No improvement in bilateral consolidation or atelectasis and groundglass opacities on repeat CT and chest x-rays. Bronchial cx with 3 colonies CoNS. CTCAP 10/12:  indicates additional groundglass opacity with paraseptal emphysema, which may be contributing to inability to remain off vent. Bronchoscopy performed and unremarkable. Samples sent to lab for culture. Patient was placed back on vent s/p procedure. Now on high flow. Completed 7 day course of cefepime and Vancomycin. Bronchial culture and gram stain negative. Micro negative for CMV, AFB, Fungitell, Pneumocystis jiroveci (carinii), Histoplasma, Aspergillus. Trach secretions sent for sputum culture shows 2+ polys, 1+ gram-positive cocci in pairs, chains and clusters and 1+ gram-negative rods. 11/14 Discussion regarding possibilities of discharge done with family. Family shows understanding of patient's condition. Cuffless trach was placed 9/17. Replaced with a cuffed Shiley XLT #7 10/3    Plan:  Likely Chemo induced pneumonitis. Continue inhalation treatment with Pulmicort q12, duonebs QID, and symbicort q12. Continue trach collar during the day and at night. Continue her on dysphagia 2 diet. Wean down HFNC to a goal FiO2 of 50% to be discharged  Daily PT/OT.   CM will look into home disposition. Large cell lymphoma ([address])  Assessment & Plan  Large B-cell lymphoma, stage II. First chemo cycle 9/22 4 days of R-EPOCH. 9/27 Rituxan. 9/28 Filgrastim. Received nivestym 300 mc 7 days dcd on 10/26      Prophylaxis:  Bactrim prophylaxis Monday Wednesday Friday,  Acyclovir 400 mg twice daily  Fluconazole 200 mg daily  Levofloxacin 250 mg daily  Lovenox 40 mg daily    9/22  4 days of R-EPOCH.  9/27 Rituxan  10/13 for [facility] cycle 2, which was done over 4 days and cytoxan on 10/19.  11/6 completed R-EPOCH cycle 3 and Cytoxan 11/7. Plan:  Oncology is following. Plan for repeat PET-CT upon hospital discharge to evaluate disease response. Transfuse blood products as needed. Keep Hgb>7 and Plt>20 for chemo   See acute respiratory failure above    Oropharyngeal mass  Assessment & Plan  9/14 Neck/ soft tissue CT: Large enhancing soft tissue mass identified within the left neck involving multiple spaces. Centered within the left oropharynx with extensions as described above into multiple spaces. This results in significant airway compromise. 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 Small level 3/level 4 nodes are seen within the neck. Tracheostomy by ENT. Replaced on 9/26. H/o tobacco abuse, daily smoker. On nicotine while inpatient, confirmed with patient she needs nicotine patch. CT soft tissue neck shows reduced mass effect from 9/14 to 10/13. Can consider reducing trach size if continues to improve. Patient often refusing peg tube feeds but is feeding herself small meals orally. Plan:  ENT and heme onc following  As per speech therapist diet  Dysphagia 2. Continue parallel PEG tube feeds for nutrition. See acute respiratory failure and large B cell lymphoma above. Pancytopenia due to chemotherapy [facility])  Assessment & Plan  Patient received 1 PRBC transfusion on 10/5 and 10/25. Her B/L hemoglobin is 12-14. No sites of bleeding, no black stools.    Iron panel indicative of inflammatory anemia. Normal Vitamin B12 levels. Folate is low 5.5. Low Hb 0.87. Filgrastim administered by heme/onc. Plan:  Trend hemoglobin and transfuse for <7. As per heme/onc transfuse irradiated and leukoreduced blood products. Trend platelets  Folic acid 1mg daily supplementation  As per my conversation with Heme/oncology attending, patient is expected to have chemotherapy associated pancytopenia. No further anemia w/u required. Low ANC management as per heme/onc    Hyperkalemia-resolved as of 11/10/2023  Assessment & Plan  Recent Labs     11/12/23  0448 11/13/23  0507 11/14/23  0440   K 3.5 3.5 3.4*   MG 1.9 1.8* 2.1     Workup:  Etiology: Patient takes bactrim. Tumor lysis would be less likely since calcium is elevated rather than decreased. Uric acid is normal.   K+ elevated but returned to normal after medical management. Will give another round of medical management if elevated again. Plan:  Kdur 40 meq PO. After 2 hours, potassium chloride 20 meq IV. Trend potassium on daily BMP. PO (acute kidney injury) (HCC)-resolved as of 9/21/2023  Assessment & Plan  Lab Results   Component Value Date    CREATININE 1.01 11/14/2023    CREATININE 0.99 11/13/2023    CREATININE 1.24 11/12/2023     Likely prerenal.  Second to poor oral intake versus poor urine output. Baseline creatinine 1 to 1.2. Cr at baseline  Plan:  Urinary retention protocol, Daily weights, I/O  Trend Cr daily    RML pneumonia-resolved as of 9/22/2023  Assessment & Plan  9/13 CT PE study: Patchy consolidation right middle lobe, new from 8/25/2023, compatible with pneumonia. No leukocytosis, no fever/chills. Positive for sore throat, cough. New onset pneumonia after recent hospitalization and antibiotics treatment. 9/14: Bronchoscopy/ ABL. MRSA negative. ABL revealed 2+ Growth of Candida albicans, suspected contaminant.      Blister (nonthermal) of left forearm, sequela  Assessment & Plan  Blisters of left upper extremity healing, no signs of infection, continue daily wound care. Generalized abdominal pain  Assessment & Plan  Patient reports abdominal pain which is unchanged since 9/22 no guarding on exam. Takes Famotidine for GERD at home. Alk phos 10/2 145, 10/20 79. CTCAP reveals complex right upper pole renal lesion requiring possible outpatient MRI    Continue Famotidine  On bowel regimen with Senna and Miralax prn    (HFpEF) heart failure with preserved ejection fraction [facility])  Assessment & Plan  Wt Readings from Last 3 Encounters:   11/14/23 79.5 kg (175 lb 4.3 oz)   09/07/23 74.6 kg (164 lb 6.4 oz)   08/30/23 73.3 kg (161 lb 9.6 oz)     Lab Results   Component Value Date    LVEF 60 08/28/2023     (H) 10/28/2023     (H) 09/29/2023     Echo 8/28/23: LVEF 60%. Plan:  K > 4   Measure I/O      Ambulatory dysfunction  Assessment & Plan  2/2 Impacted by chronic pain syndrome + prolonged hospital stay. Continue OOB with PT. PT rec post acute rehab however reportedly Cannot get LTACH placement while getting chemo per CM  She is aware STR SNFs continue to follow and treatment can be done from a SNF level of care. PT would need to be on trach collar for at least 72 hours with no need for the vent. Aware that pt would need to be around 28% FiO2     Depression  Assessment & Plan  Patient on Zoloft 75 daily and Seroquel hs  Patient is depressed, multiple family/palliative discussions. patient is firm that she wants to live and to fight, she is just tired. Currently goal is disease treatment oriented. Full code. No SI/HI ideations. Palliative signed off, will reconsult if patient changes her mind regarding wishes. Chronic Superficial thrombophlebitis  Assessment & Plan  Right forearm soreness. RUE US: No acute or chronic deep vein thrombosis. 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 PO not severe enough to require renal dosing at this time, will continue to monitor and adjust dosing as needed. Continue DVT ppx with Lovenox 40    CKD (chronic kidney disease) stage 3, GFR 30-59 ml/min [facility])  Assessment & Plan  Lab Results   Component Value Date    EGFR 56 11/14/2023    EGFR 57 11/13/2023    EGFR 44 11/12/2023    CREATININE 1.01 11/14/2023    CREATININE 0.99 11/13/2023    CREATININE 1.24 11/12/2023     Baseline Cr between 0.75-1.1  Initial HERIBERTO felt 2/2 prerenal azotemia 2/2 diuresis, reduced PO intake +/- ATN. Patient received 2 doses of Bumex IV 2 g as she had not been responding appropriately to IV 40 Lasix daily. Creatinine went up by 0.5 meeting criteria for an HERIBERTO. Suspect most likely due to medications as a combination of prerenal and intrinsic. FENa was 0.2%, UA shows no casts or signs of infection, urine eosinophils 0%. Patient likely has prerenal HERIBERTO from volume depletion. Received 2 L NS and 1 pRBC and cr went up by 0.07 still and Na and Cl went down, suspect that volume prevented further cr rise and free water excretion impaired by kidney function, allowing hyponatremia to increase. Suspect that now that nephrotoxic medications have been stopped she responded well to Lasix and creatinine downtrended. HERIBERTO now resolved. Will be cautious about nephrotoxins and monitor as restarting EPOCH and ppx. Patient gets volume depleted and overloaded very easily. Especially from chemo. 10/19 Heriberto as cr went up by 0.3 in 48 hours from 0.82 on 10/17 to 1.22 on 10/19. Suspect this is prerenal hypovolemic, will see if patient improves with fluids. She gets overloaded easily so if no improvement suspect CRS again. 10/29 HERIBERTO cr went up by 0.3 again. She has 922 ml UOP in last 24 hours. HERIBERTO likely from lasix 40mg IV given yesterday. She is not hypovolemic. Plan:   Continue to monitor UO/renal function. Avoid nephrotoxic agents  Continue to monitor a.m. BMP. Pain syndrome, chronic  Assessment & Plan  Home regimen: Oxycodone 5 mg BID, Tylenol 650 mg q8 prn. Gabapentin 600 mg TID.   Medical marijuana. Lumbar radiculopathy and impaired ambulatory dysfunction secondary to pain. Has bowel regimen and is having bowel movements daily. Patient required additional pain management during previous cycle and has some additional pain from tunneled line placement    Plan:  Continue gabapentin 300 mg TID, oxycodone 5 mg TID, prn Tylenol 650 mg q6. Oxycodone 5mg every 8 hours  Oxycodone 2.5 mg q6 PRN    Benign essential hypertension  Assessment & Plan  Home regimen Coreg 12.5 mg BID, Amlodipine 10 mg daily, and lisinopril 40 mg. All discontinued at this time secondary to hypotension and PO since cycle 1. but stable currently without any antihypertensives. Systolic persistently elevated and tachycardic, potentially secondary to pain. Patient was more hypotensive during last chemo. Coreg contraindicated during chemo, since cycles are every other week, would be better to stick with lopressor during duration of therapy as opposed to switching constantly. Plan:  Monitor vitals. Continue Norvasc 10 and lopressor 25 bid  Prn hydralazine for SBP > 160    Angioedema-resolved as of 11/4/2023  Assessment & Plan  POA in [place] ([place]) ED: Swollen tongue, soft and hard palate with severe angioedema. Decadron 10 mg, Benadryl 25 mg given. 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 Plan:  Cuffless trach 9/17, cuffed Shiley XLT #7 10/3  See acute respiratory failure and oropharyngeal mass above    Hyperlipidemia-resolved as of 10/26/2023  Assessment & Plan  Lab Results   Component Value Date    CHOLESTEROL 118 10/09/2023    HDL 14 (L) 10/09/2023    TRIG 144 10/09/2023    [facility] 104 10/09/2023     Continue outpatient diet and lifestyle modification. COPD without exacerbation (HCC)-resolved as of 9/26/2023  Assessment & Plan  Continue vent with nebs. Encephalopathy-resolved as of 9/21/2023  Assessment & Plan  9/19- Pt appeared to be AAO x3. Improving.              ICU Core Measures     Vented Patient  VAP Bundle  VAP bundle ordered     A: Assess, Prevent, and Manage Pain Has pain been assessed? Yes  Need for changes to pain regimen? No   B: Both Spontaneous Awakening Trials (SATs) and Spontaneous Breathing Trials (SBTs) Plan to perform spontaneous awakening trial today? N/A   Plan to perform spontaneous breathing trial today? N/A   Obvious barriers to extubation? NA   C: Choice of Sedation RASS Goal: 0 Alert and Calm  Need for changes to sedation or analgesia regimen? NA   D: Delirium CAM-ICU: Negative   E: Early Mobility  Plan for early mobility? Yes   F: Family Engagement Plan for family engagement today? Yes       Antibiotic Review: patient on prophylactic antibiotics    Review of Invasive Devices:      Central access plan: Medications requiring central line      Prophylaxis:  VTE VTE covered by:  enoxaparin, Subcutaneous, 40 mg at 11/13/23 0806       Stress Ulcer  covered byfamotidine (PEPCID) tablet 20 mg [[id number]]       Subjective   HPI/24hr events: No events reported overnight. Review of Systems   Reason unable to perform ROS: Patient refused to speak with me. Objective                            Vitals I/O      Most Recent Min/Max in 24hrs   Temp 98.7 °F (37.1 °C) Temp  Min: 98 °F (36.7 °C)  Max: 99.2 °F (37.3 °C)   Pulse 83 Pulse  Min: 80  Max: 105   Resp 20 Resp  Min: 13  Max: 20   /75 BP  Min: 107/55  Max: 139/75   O2 Sat 98 % SpO2  Min: 92 %  Max: 100 %      Intake/Output Summary (Last 24 hours) at 11/14/2023 0806  Last data filed at 11/14/2023 0616  Gross per 24 hour   Intake 70 ml   Output 925 ml   Net -855 ml         Diet Dysphagia/Modified Consistency; Dysphagia 2-Mechanical Soft; Honey Thick Liquid     Invasive Monitoring Physical exam    Physical Exam  Vitals and nursing note reviewed. Patient refused to speak with me.      Diagnostic Studies      EKG: NSR  Imaging: N/A      Medications:  Scheduled PRN   acyclovir, 400 mg, BID  amLODIPine, 10 mg, Daily  budesonide, 0.5 mg, Q12H  busPIRone, 30 mg, TID  chlorhexidine, 15 mL, Q12H ASHLEY  enoxaparin, 40 mg, Q24H ASHLEY  famotidine, 20 mg, BID  Filgrastim-aafi, 300 mcg, Once  fluconazole, 643 mg, Daily  folic acid, 1 mg, Daily  formoterol, 20 mcg, Q12H  furosemide, 40 mg, BID (diuretic)  gabapentin, 300 mg, TID  levalbuterol, 1.25 mg, Q6H   And  ipratropium, 0.5 mg, Q6H  melatonin, 3 mg, HS  metoprolol tartrate, 25 mg, Q12H ASHLEY  nicotine, 7 mg, Daily  oxyCODONE, 5 mg, Q8H  potassium chloride, 20 mEq, Once  potassium chloride, 40 mEq, Once  QUEtiapine, 50 mg, HS  senna, 8.8 mg, HS  sertraline, 75 mg, Daily  sodium chloride, 4 mL, TID  sulfamethoxazole-trimethoprim, 1 tablet, Once per day on Mon Wed Fri      alteplase, 2 mg, Q1MIN PRN  alteplase, 2 mg, Q1MIN PRN  alteplase, 2 mg, Q1MIN PRN  ondansetron, 4 mg, Q8H PRN  oxyCODONE, 2.5 mg, Q6H PRN       Continuous          Labs:    CBC    Recent Labs     11/13/23  0507 11/13/23  0705 11/14/23  0440   WBC 0.96* 0.87* 1.18*   HGB 7.2* 7.3* 7.0*   HCT 23.3* 22.7* 22.3*    176 158   BANDSPCT 1  --  1     BMP    Recent Labs     11/13/23  0507 11/14/23  0440   SODIUM 146 144   K 3.5 3.4*    107   CO2 33* 34*   AGAP 5 3   BUN 31* 22   CREATININE 0.99 1.01   CALCIUM 9.6 9.6       Coags    No recent results     Additional Electrolytes  Recent Labs     11/13/23  0507 11/14/23  0440   MG 1.8* 2.1   PHOS 3.5 3.7   CAIONIZED 1.24  --           Blood Gas    No recent results  No recent results LFTs  No recent results    Infectious  No recent results  Glucose  Recent Labs     11/13/23  0507 11/14/23  0440   GLUC 95 105                 Reem Ian, MD

## 2023-11-14 NOTE — CASE MANAGEMENT
Case Management Progress Note    Patient name Cornelio Alegre  Location ICU 03/ICU 03 MRN [id number]  : 1955 Date 2023       LOS (days): 62  Geometric Mean LOS (GMLOS) (days): 26.10  Days to GMLOS:-35.8        OBJECTIVE:        Current admission status: Inpatient  Preferred Pharmacy:   [id number] [place] - [address]. [address] [place] [address]  Phone: [phone] Fax: [phone] [employer] ([place]) [place], [place] - [address] [address]  Phone: [phone] Fax: [phone]    Primary Care Provider: Karen Holden MD    Primary Insurance: [place]  Secondary Insurance: [facility]    PROGRESS NOTE:    CM did receive a VM from , Brendan Del Angel. Return called place and a VM left. CM also spoke with Margarito Ennis. Valentino Levins was on site and spoke with the CC AP. Will re-assess pt needs in 48 hours. Valentino Levins able to come back out to discuss further with the [address] [place] [address] team once decisions regarding pt O2 needs are determined. CM re-opened VNA referral. CM reviewed with pt that [facility] and  [facility] are both able to accept. Pt preference is  [facility] for continuity of care. CM reserved and F/U Providers updated. Pt aware that CM will update her daughter as well. Pt reports very supportive family between her two children, DIL, and . Pt reports she also has a very supportive extended family.

## 2023-11-14 NOTE — ASSESSMENT & PLAN NOTE
Recent Labs     11/12/23  0448 11/13/23  0507 11/14/23  0440   K 3.5 3.5 3.4*   MG 1.9 1.8* 2.1     Workup:  Etiology: Patient takes bactrim. Tumor lysis would be less likely since calcium is elevated rather than decreased. Uric acid is normal.   K+ elevated but returned to normal after medical management. Will give another round of medical management if elevated again. Plan:  Kdur 40 meq PO. After 2 hours, potassium chloride 20 meq IV. Trend potassium on daily BMP.

## 2023-11-14 NOTE — ASSESSMENT & PLAN NOTE
Lab Results   Component Value Date    CREATININE 1.01 11/14/2023    CREATININE 0.99 11/13/2023    CREATININE 1.24 11/12/2023     Likely prerenal.  Second to poor oral intake versus poor urine output. Baseline creatinine 1 to 1.2.     Cr at baseline  Plan:  Urinary retention protocol, Daily weights, I/O  Trend Cr daily

## 2023-11-14 NOTE — PLAN OF CARE
Problem: MOBILITY - ADULT  Goal: Maintain or return to baseline ADL function  Description: INTERVENTIONS:  -  Assess patient's ability to carry out ADLs; assess patient's baseline for ADL function and identify physical deficits which impact ability to perform ADLs (bathing, care of mouth/teeth, toileting, grooming, dressing, etc.)  - Assess/evaluate cause of self-care deficits   - Assess range of motion  - Assess patient's mobility; develop plan if impaired  - Assess patient's need for assistive devices and provide as appropriate  - Encourage maximum independence but intervene and supervise when necessary  - Involve family in performance of ADLs  - Assess for home care needs following discharge   - Consider OT consult to assist with ADL evaluation and planning for discharge  - Provide patient education as appropriate  Outcome: Progressing  Goal: Maintains/Returns to pre admission functional level  Description: INTERVENTIONS:  - Perform BMAT or MOVE assessment daily.   - Set and communicate daily mobility goal to care team and patient/family/caregiver. - Collaborate with rehabilitation services on mobility goals if consulted  - Perform Range of Motion  times a day. - Reposition patient every  hours.   - Dangle patient  times a day  - Stand patient  times a day  - Ambulate patient  times a day  - Out of bed to chair  times a day   - Out of bed for meals   Problem: Prexisting or High Potential for Compromised Skin Integrity  Goal: Skin integrity is maintained or improved  Description: INTERVENTIONS:  - Identify patients at risk for skin breakdown  - Assess and monitor skin integrity  - Assess and monitor nutrition and hydration status  - Monitor labs   - Assess for incontinence   - Turn and reposition patient  - Assist with mobility/ambulation  - Relieve pressure over bony prominences  - Avoid friction and shearing  - Provide appropriate hygiene as needed including keeping skin clean and dry  - Evaluate need for skin moisturizer/barrier cream  - Collaborate with interdisciplinary team   - Patient/family teaching  - Consider wound care consult   Outcome: Progressing     Problem: Nutrition/Hydration-ADULT  Goal: Nutrient/Hydration intake appropriate for improving, restoring or maintaining nutritional needs  Description: Monitor and assess patient's nutrition/hydration status for malnutrition. Collaborate with interdisciplinary team and initiate plan and interventions as ordered. Monitor patient's weight and dietary intake as ordered or per policy. Utilize nutrition screening tool and intervene as necessary. Determine patient's food preferences and provide high-protein, high-caloric foods as appropriate.      INTERVENTIONS:  - Monitor oral intake, urinary output, labs, and treatment plans  - Assess nutrition and hydration status and recommend course of action  - Evaluate amount of meals eaten  - Assist patient with eating if necessary   - Allow adequate time for meals  - Recommend/ encourage appropriate diets, oral nutritional supplements, and vitamin/mineral supplements  - Order, calculate, and assess calorie counts as needed  - Recommend, monitor, and adjust tube feedings and TPN/PPN based on assessed needs  - Assess need for intravenous fluids  - Provide specific nutrition/hydration education as appropriate  - Include patient/family/caregiver in decisions related to nutrition  Outcome: Progressing     Problem: PAIN - ADULT  Goal: Verbalizes/displays adequate comfort level or baseline comfort level  Description: Interventions:  - Encourage patient to monitor pain and request assistance  - Assess pain using appropriate pain scale  - Administer analgesics based on type and severity of pain and evaluate response  - Implement non-pharmacological measures as appropriate and evaluate response  - Consider cultural and social influences on pain and pain management  - Notify physician/advanced practitioner if interventions unsuccessful or patient reports new pain  Outcome: Progressing     Problem: SAFETY ADULT  Goal: Maintain or return to baseline ADL function  Description: INTERVENTIONS:  -  Assess patient's ability to carry out ADLs; assess patient's baseline for ADL function and identify physical deficits which impact ability to perform ADLs (bathing, care of mouth/teeth, toileting, grooming, dressing, etc.)  - Assess/evaluate cause of self-care deficits   - Assess range of motion  - Assess patient's mobility; develop plan if impaired  - Assess patient's need for assistive devices and provide as appropriate  - Encourage maximum independence but intervene and supervise when necessary  - Involve family in performance of ADLs  - Assess for home care needs following discharge   - Consider OT consult to assist with ADL evaluation and planning for discharge  - Provide patient education as appropriate  Outcome: Progressing  Goal: Maintains/Returns to pre admission functional level  Description: INTERVENTIONS:  - Perform BMAT or MOVE assessment daily.   - Set and communicate daily mobility goal to care team and patient/family/caregiver. - Collaborate with rehabilitation services on mobility goals if consulted  - Perform Range of Motion  times a day. - Reposition patient every  hours.   - Dangle patient  times a day  - Stand patient  times a day  - Ambulate patient  times a day  - Out of bed to chair  times a day   - Out of bed for meals  times a day  - Out of bed for toileting  - Record patient progress and toleration of activity level   Outcome: Progressing  Goal: Patient will remain free of falls  Description: INTERVENTIONS:  - Educate patient/family on patient safety including physical limitations  - Instruct patient to call for assistance with activity   - Consult OT/PT to assist with strengthening/mobility   - Keep Call bell within reach  - Keep bed low and locked with side rails adjusted as appropriate  - Keep care items and personal belongings within reach  - Initiate and maintain comfort rounds  - Make Fall Risk Sign visible to staff  - Offer Toileting every  Hours, in advance of need  - Initiate/Maintain alarm  - Obtain necessary fall risk management equipment:   Problem: RESPIRATORY - ADULT  Goal: Achieves optimal ventilation and oxygenation  Description: INTERVENTIONS:  - Assess for changes in respiratory status  - Assess for changes in mentation and behavior  - Position to facilitate oxygenation and minimize respiratory effort  - Oxygen administered by appropriate delivery if ordered  - Initiate smoking cessation education as indicated  - Encourage broncho-pulmonary hygiene including cough, deep breathe, Incentive Spirometry  - Assess the need for suctioning and aspirate as needed  - Assess and instruct to report SOB or any respiratory difficulty  - Respiratory Therapy support as indicated  Outcome: Progressing     Problem: GENITOURINARY - ADULT  Goal: Maintains or returns to baseline urinary function  Description: INTERVENTIONS:  - Assess urinary function  - Encourage oral fluids to ensure adequate hydration if ordered  - Administer IV fluids as ordered to ensure adequate hydration  - Administer ordered medications as needed  - Offer frequent toileting  - Follow urinary retention protocol if ordered  Outcome: Progressing  Goal: Absence of urinary retention  Description: INTERVENTIONS:  - Assess patient’s ability to void and empty bladder  - Monitor I/O  - Bladder scan as needed  - Discuss with physician/AP medications to alleviate retention as needed  - Discuss catheterization for long term situations as appropriate  Outcome: Progressing     - Apply yellow socks and bracelet for high fall risk patients  - Consider moving patient to room near nurses station  Outcome: Progressing    times a day  - Out of bed for toileting  - Record patient progress and toleration of activity level   Outcome: Progressing

## 2023-11-14 NOTE — CASE MANAGEMENT
Case Management Progress Note    Patient name Cosme Crigler  Location ICU 03/ICU 03 MRN [id number]  : 1955 Date 2023       LOS (days): 62  Geometric Mean LOS (GMLOS) (days): 26.10  Days to GMLOS:-35.7        OBJECTIVE:        Current admission status: Inpatient  Preferred Pharmacy:   [id number] [place] - [address] [facility]. [address] [facility] [address] [facility] [address]  Phone: [phone] Fax: [phone] [employer] ([place] ([place], [place] - [address] [facility]  [address] [facility] [address]  Phone: [phone] Fax: [phone]    Primary Care Provider: Marcos Rosa MD    [facility]  Secondary Insurance: [address] [employer]    PROGRESS NOTE:    CM met with pt's family - Daughter, son, DIL (via phone), , and the pt at bedside along with CC residents - Dr. Ross Sullivan and Dr. Cato Nissen. Medical team discussed update regarding pt care. Discussed need for ICU and the amount of O2 required at this time. Due to this pt is not able to dc to a SNF or to home. CM re-reviewed options for dc:  [facility]ACH - If an option pt would need to agree to place Chemo on hold. Family also made aware it is not guaranteed pt would be accepted or authorization would be approved. SNF - [address] and requirements needed around pt O2 to safely dc to the facility. Home - amount of O2 that is able to managed at home. CM also reviewed that [facility] through waiver are not able to provide trach/peg care and this would be pt/family along with support from a VNA. After discussion the pt and family would prefer for pt to return home with O2 and family support. At this time pt and family are not yet ready for Hospice. The physician did review the risks of pt returning home (please refer to their documentation regarding the meeting). Family shows understanding of this. Aware this will take some time to set up.  CM to reach out to Justino Melvin - Pt's  in the morning. Aware CM will also coordinate with the RT from [address] [place] [address] regarding needs at home. When pt is ready trach supplies and tube feeds will also be ordered. Family aware they can come to pt bedside to receive teaching on the trach/PEG care form the nursing staff. VNA will also be set up.

## 2023-11-14 NOTE — QUICK NOTE
Chart and case were reviewed by Pantera Leija, [address] [place] [address]. Mode of review included electronic chart check and discussed case with ICU AP. Goals goals are currently clear. Patient and family wish to continue level 1 code status with full care with ongoing oncology care. As per CM who was present at meeting last evening, family does not wish to consider comfort care or hospice    Symptoms are being managed by primary team.    For dispo plan, please review Case Management notes. CM working with family to provide adequate resources at home versus placement. Because patient is bothered by number of people in and out of the room and because she typically asks that we leave mid conversation palliative will sign off at this time. Patient is appropriate for outpatient follow-up should her and her family chose to seek out assistance with symptom management. They have been provided our contact information to schedule an appointment in the future. For urgent issues or any questions/concerns, please notify on-call provider via [address]. You may also call our answering service 24/7 at [phone]. RANDEE Ortiz  Palliative and Supportive Care  Clinic/Answering Service: [phone]  You can find me on Pema!

## 2023-11-14 NOTE — ASSESSMENT & PLAN NOTE
Large B-cell lymphoma, stage II. First chemo cycle 9/22 4 days of R-EPOCH. 9/27 Rituxan. 9/28 Filgrastim. Received nivestym 300 mc 7 days dcd on 10/26      Prophylaxis:  Bactrim prophylaxis Monday Wednesday Friday,  Acyclovir 400 mg twice daily  Fluconazole 200 mg daily  Levofloxacin 250 mg daily  Lovenox 40 mg daily    9/22  4 days of R-EPOCH.  9/27 Rituxan  10/13 for [facility] cycle 2, which was done over 4 days and cytoxan on 10/19.  11/6 completed R-EPOCH cycle 3 and Cytoxan 11/7. Plan:  Oncology is following. Plan for repeat PET-CT upon hospital discharge to evaluate disease response. Transfuse blood products as needed.   Keep Hgb>7 and Plt>20 for chemo   See acute respiratory failure above

## 2023-11-14 NOTE — MALNUTRITION/BMI
This medical record reflects one or more clinical indicators suggestive of malnutrition and/or morbid obesity. Malnutrition Findings:   Adult Malnutrition type: Chronic illness  Adult Degree of Malnutrition: Unspecified protein calorie malnutrition  Malnutrition Characteristics: Inadequate energy, Other (comment) (stress of illness)                360 Statement: Protein calorie malnutrition r/t inadequate intake as evidenced by >7 day period of poor intakes and stress of infection; currently treated with regular diet and nutrition supplements    BMI Findings: Body mass index is 33.52 kg/m². See Nutrition note dated 11/14/2023  for additional details. Completed nutrition assessment is viewable in the nutrition documentation.

## 2023-11-14 NOTE — ASSESSMENT & PLAN NOTE
Lab Results   Component Value Date    EGFR 56 11/14/2023    EGFR 57 11/13/2023    EGFR 44 11/12/2023    CREATININE 1.01 11/14/2023    CREATININE 0.99 11/13/2023    CREATININE 1.24 11/12/2023     Baseline Cr between 0.75-1.1  Initial HERIBERTO felt 2/2 prerenal azotemia 2/2 diuresis, reduced PO intake +/- ATN. Patient received 2 doses of Bumex IV 2 g as she had not been responding appropriately to IV 40 Lasix daily. Creatinine went up by 0.5 meeting criteria for an HERIBERTO. Suspect most likely due to medications as a combination of prerenal and intrinsic. FENa was 0.2%, UA shows no casts or signs of infection, urine eosinophils 0%. Patient likely has prerenal HERIBERTO from volume depletion. Received 2 L NS and 1 pRBC and cr went up by 0.07 still and Na and Cl went down, suspect that volume prevented further cr rise and free water excretion impaired by kidney function, allowing hyponatremia to increase. Suspect that now that nephrotoxic medications have been stopped she responded well to Lasix and creatinine downtrended. HERIBERTO now resolved. Will be cautious about nephrotoxins and monitor as restarting EPOCH and ppx. Patient gets volume depleted and overloaded very easily. Especially from chemo. 10/19 Heriberto as cr went up by 0.3 in 48 hours from 0.82 on 10/17 to 1.22 on 10/19. Suspect this is prerenal hypovolemic, will see if patient improves with fluids. She gets overloaded easily so if no improvement suspect CRS again. 10/29 HERIBERTO cr went up by 0.3 again. She has 922 ml UOP in last 24 hours. HERIBERTO likely from lasix 40mg IV given yesterday. She is not hypovolemic. Plan:   Continue to monitor UO/renal function. Avoid nephrotoxic agents  Continue to monitor a.m. BMP.

## 2023-11-14 NOTE — ASSESSMENT & PLAN NOTE
Cuffless trach placed 9/17, transitioned to cuffed on 10/3. Oxygen requirements not improving. For mental health patient is on buspirone, quetiapine, and sertraline. For pain, gabapentin, oxycodone scheduled and prn, prn oxycodone mainly utilized for increased pain during chemo and after a bronch. On Guaifenesin, Atrovent and Xopenex for airway maintenance. No improvement in bilateral consolidation or atelectasis and groundglass opacities on repeat CT and chest x-rays. Bronchial cx with 3 colonies CoNS. CTCAP 10/12:  indicates additional groundglass opacity with paraseptal emphysema, which may be contributing to inability to remain off vent. Bronchoscopy performed and unremarkable. Samples sent to lab for culture. Patient was placed back on vent s/p procedure. Now on high flow. Completed 7 day course of cefepime and Vancomycin. Bronchial culture and gram stain negative. Micro negative for CMV, AFB, Fungitell, Pneumocystis jiroveci (carinii), Histoplasma, Aspergillus. Trach secretions sent for sputum culture shows 2+ polys, 1+ gram-positive cocci in pairs, chains and clusters and 1+ gram-negative rods. 11/14 Discussion regarding possibilities of discharge done with family. Family shows understanding of patient's condition. Cuffless trach was placed 9/17. Replaced with a cuffed Shiley XLT #7 10/3    Plan:  Likely Chemo induced pneumonitis. Continue inhalation treatment with Pulmicort q12, duonebs QID, and symbicort q12. Continue trach collar during the day and at night. Continue her on dysphagia 2 diet. Wean down HFNC to a goal FiO2 of 50% to be discharged  Daily PT/OT. CM will look into home disposition.

## 2023-11-15 ENCOUNTER — APPOINTMENT (INPATIENT)
Dept: CT IMAGING | Facility: HOSPITAL | Age: 68
DRG: 004 | End: 2023-11-15
Payer: COMMERCIAL

## 2023-11-15 LAB
ANION GAP SERPL CALCULATED.3IONS-SCNC: 7 MMOL/L
ANISOCYTOSIS BLD QL SMEAR: PRESENT
BASOPHILS # BLD AUTO: 0.01 THOUSANDS/ÂΜL (ref 0–0.1)
BASOPHILS NFR BLD AUTO: 1 % (ref 0–1)
BUN SERPL-MCNC: 22 MG/DL (ref 5–25)
CALCIUM SERPL-MCNC: 9.8 MG/DL (ref 8.4–10.2)
CHLORIDE SERPL-SCNC: 106 MMOL/L (ref 96–108)
CO2 SERPL-SCNC: 32 MMOL/L (ref 21–32)
CREAT SERPL-MCNC: 1.63 MG/DL (ref 0.6–1.3)
EOSINOPHIL # BLD AUTO: 0.07 THOUSAND/ÂΜL (ref 0–0.61)
EOSINOPHIL NFR BLD AUTO: 5 % (ref 0–6)
ERYTHROCYTE [DISTWIDTH] IN BLOOD BY AUTOMATED COUNT: 15.6 % (ref 11.6–15.1)
GFR SERPL CREATININE-BSD FRML MDRD: 31 ML/MIN/1.73SQ M
GLUCOSE SERPL-MCNC: 118 MG/DL (ref 65–140)
HCT VFR BLD AUTO: 23.1 % (ref 34.8–46.1)
HGB BLD-MCNC: 7.2 G/DL (ref 11.5–15.4)
HYPERCHROMIA BLD QL SMEAR: PRESENT
IMM GRANULOCYTES # BLD AUTO: 0.13 THOUSAND/UL (ref 0–0.2)
IMM GRANULOCYTES NFR BLD AUTO: 9 % (ref 0–2)
LG PLATELETS BLD QL SMEAR: PRESENT
LYMPHOCYTES # BLD AUTO: 0.55 THOUSANDS/ÂΜL (ref 0.6–4.47)
LYMPHOCYTES NFR BLD AUTO: 38 % (ref 14–44)
MAGNESIUM SERPL-MCNC: 1.8 MG/DL (ref 1.9–2.7)
MCH RBC QN AUTO: 30.8 PG (ref 26.8–34.3)
MCHC RBC AUTO-ENTMCNC: 31.2 G/DL (ref 31.4–37.4)
MCV RBC AUTO: 99 FL (ref 82–98)
MICROCYTES BLD QL AUTO: PRESENT
MONOCYTES # BLD AUTO: 0.17 THOUSAND/ÂΜL (ref 0.17–1.22)
MONOCYTES NFR BLD AUTO: 12 % (ref 4–12)
NEUTROPHILS # BLD AUTO: 0.5 THOUSANDS/ÂΜL (ref 1.85–7.62)
NEUTS SEG NFR BLD AUTO: 35 % (ref 43–75)
NRBC BLD AUTO-RTO: 0 /100 WBCS
PLATELET # BLD AUTO: 155 THOUSANDS/UL (ref 149–390)
PLATELET BLD QL SMEAR: ADEQUATE
PMV BLD AUTO: 9.3 FL (ref 8.9–12.7)
POLYCHROMASIA BLD QL SMEAR: PRESENT
POTASSIUM SERPL-SCNC: 4.1 MMOL/L (ref 3.5–5.3)
RBC # BLD AUTO: 2.34 MILLION/UL (ref 3.81–5.12)
RBC MORPH BLD: PRESENT
SODIUM SERPL-SCNC: 145 MMOL/L (ref 135–147)
TOXIC GRANULES BLD QL SMEAR: PRESENT
WBC # BLD AUTO: 1.4 THOUSAND/UL (ref 4.31–10.16)

## 2023-11-15 PROCEDURE — 83735 ASSAY OF MAGNESIUM: CPT

## 2023-11-15 PROCEDURE — 99232 SBSQ HOSP IP/OBS MODERATE 35: CPT | Performed by: INTERNAL MEDICINE

## 2023-11-15 PROCEDURE — 93005 ELECTROCARDIOGRAM TRACING: CPT

## 2023-11-15 PROCEDURE — 85027 COMPLETE CBC AUTOMATED: CPT

## 2023-11-15 PROCEDURE — 94640 AIRWAY INHALATION TREATMENT: CPT

## 2023-11-15 PROCEDURE — G1004 CDSM NDSC: HCPCS

## 2023-11-15 PROCEDURE — 80048 BASIC METABOLIC PNL TOTAL CA: CPT

## 2023-11-15 PROCEDURE — 94760 N-INVAS EAR/PLS OXIMETRY 1: CPT

## 2023-11-15 PROCEDURE — 70491 CT SOFT TISSUE NECK W/DYE: CPT

## 2023-11-15 RX ADMIN — ACYCLOVIR 400 MG: 200 CAPSULE ORAL at 08:49

## 2023-11-15 RX ADMIN — MELATONIN 3 MG: at 21:49

## 2023-11-15 RX ADMIN — FILGRASTIM-AAFI 300 MCG: 300 INJECTION, SOLUTION SUBCUTANEOUS at 09:14

## 2023-11-15 RX ADMIN — FAMOTIDINE 20 MG: 20 TABLET, FILM COATED ORAL at 17:07

## 2023-11-15 RX ADMIN — FLUCONAZOLE 400 MG: 100 TABLET ORAL at 08:48

## 2023-11-15 RX ADMIN — FAMOTIDINE 20 MG: 20 TABLET, FILM COATED ORAL at 08:49

## 2023-11-15 RX ADMIN — OXYCODONE HYDROCHLORIDE 5 MG: 5 SOLUTION ORAL at 13:42

## 2023-11-15 RX ADMIN — BUDESONIDE 0.5 MG: 0.5 INHALANT ORAL at 07:10

## 2023-11-15 RX ADMIN — IPRATROPIUM BROMIDE 0.5 MG: 0.5 SOLUTION RESPIRATORY (INHALATION) at 13:03

## 2023-11-15 RX ADMIN — BUSPIRONE HYDROCHLORIDE 30 MG: 10 TABLET ORAL at 17:07

## 2023-11-15 RX ADMIN — APIXABAN 10 MG: 5 TABLET, FILM COATED ORAL at 21:49

## 2023-11-15 RX ADMIN — SODIUM CHLORIDE SOLN NEBU 3% 4 ML: 3 NEBU SOLN at 20:30

## 2023-11-15 RX ADMIN — NICOTINE 7 MG: 7 PATCH, EXTENDED RELEASE TRANSDERMAL at 08:51

## 2023-11-15 RX ADMIN — IPRATROPIUM BROMIDE 0.5 MG: 0.5 SOLUTION RESPIRATORY (INHALATION) at 07:10

## 2023-11-15 RX ADMIN — OXYCODONE HYDROCHLORIDE 5 MG: 5 SOLUTION ORAL at 21:50

## 2023-11-15 RX ADMIN — LEVALBUTEROL HYDROCHLORIDE 1.25 MG: 1.25 SOLUTION RESPIRATORY (INHALATION) at 02:48

## 2023-11-15 RX ADMIN — CHLORHEXIDINE GLUCONATE 15 ML: 1.2 SOLUTION ORAL at 21:49

## 2023-11-15 RX ADMIN — OXYCODONE HYDROCHLORIDE 5 MG: 5 SOLUTION ORAL at 06:37

## 2023-11-15 RX ADMIN — SERTRALINE 75 MG: 25 TABLET, FILM COATED ORAL at 08:48

## 2023-11-15 RX ADMIN — BUSPIRONE HYDROCHLORIDE 30 MG: 10 TABLET ORAL at 21:51

## 2023-11-15 RX ADMIN — METOPROLOL TARTRATE 25 MG: 25 TABLET, FILM COATED ORAL at 08:49

## 2023-11-15 RX ADMIN — FORMOTEROL FUMARATE DIHYDRATE 20 MCG: 20 SOLUTION RESPIRATORY (INHALATION) at 20:30

## 2023-11-15 RX ADMIN — AMLODIPINE BESYLATE 10 MG: 5 TABLET ORAL at 08:49

## 2023-11-15 RX ADMIN — GABAPENTIN 300 MG: 300 CAPSULE ORAL at 17:07

## 2023-11-15 RX ADMIN — SULFAMETHOXAZOLE AND TRIMETHOPRIM 1 TABLET: 800; 160 TABLET ORAL at 08:49

## 2023-11-15 RX ADMIN — ACYCLOVIR 400 MG: 200 CAPSULE ORAL at 17:09

## 2023-11-15 RX ADMIN — SODIUM CHLORIDE SOLN NEBU 3% 4 ML: 3 NEBU SOLN at 13:03

## 2023-11-15 RX ADMIN — LEVALBUTEROL HYDROCHLORIDE 1.25 MG: 1.25 SOLUTION RESPIRATORY (INHALATION) at 13:03

## 2023-11-15 RX ADMIN — LEVALBUTEROL HYDROCHLORIDE 1.25 MG: 1.25 SOLUTION RESPIRATORY (INHALATION) at 07:10

## 2023-11-15 RX ADMIN — CHLORHEXIDINE GLUCONATE 15 ML: 1.2 SOLUTION ORAL at 08:47

## 2023-11-15 RX ADMIN — IOHEXOL 85 ML: 350 INJECTION, SOLUTION INTRAVENOUS at 12:52

## 2023-11-15 RX ADMIN — LEVALBUTEROL HYDROCHLORIDE 1.25 MG: 1.25 SOLUTION RESPIRATORY (INHALATION) at 20:30

## 2023-11-15 RX ADMIN — GABAPENTIN 300 MG: 300 CAPSULE ORAL at 08:48

## 2023-11-15 RX ADMIN — BUDESONIDE 0.5 MG: 0.5 INHALANT ORAL at 20:30

## 2023-11-15 RX ADMIN — FORMOTEROL FUMARATE DIHYDRATE 20 MCG: 20 SOLUTION RESPIRATORY (INHALATION) at 07:10

## 2023-11-15 RX ADMIN — ENOXAPARIN SODIUM 40 MG: 40 INJECTION SUBCUTANEOUS at 08:51

## 2023-11-15 RX ADMIN — GABAPENTIN 300 MG: 300 CAPSULE ORAL at 21:50

## 2023-11-15 RX ADMIN — IPRATROPIUM BROMIDE 0.5 MG: 0.5 SOLUTION RESPIRATORY (INHALATION) at 20:30

## 2023-11-15 RX ADMIN — METOPROLOL TARTRATE 25 MG: 25 TABLET, FILM COATED ORAL at 21:49

## 2023-11-15 RX ADMIN — IPRATROPIUM BROMIDE 0.5 MG: 0.5 SOLUTION RESPIRATORY (INHALATION) at 02:49

## 2023-11-15 RX ADMIN — BUSPIRONE HYDROCHLORIDE 30 MG: 10 TABLET ORAL at 08:50

## 2023-11-15 RX ADMIN — FUROSEMIDE 40 MG: 10 INJECTION, SOLUTION INTRAMUSCULAR; INTRAVENOUS at 08:50

## 2023-11-15 RX ADMIN — SODIUM CHLORIDE SOLN NEBU 3% 4 ML: 3 NEBU SOLN at 07:10

## 2023-11-15 RX ADMIN — FOLIC ACID 1 MG: 1 TABLET ORAL at 08:48

## 2023-11-15 RX ADMIN — QUETIAPINE FUMARATE 50 MG: 25 TABLET ORAL at 21:50

## 2023-11-15 NOTE — PROGRESS NOTES
[facility]  Progress Note  Name: Dion Sahu  MRN: [id number]  Unit/Bed#: ICU 03 I Date of Admission: 9/13/2023   Date of Service: 11/15/2023 I Hospital Day: 61    Assessment/Plan   * Acute respiratory failure with hypoxia secondary to oropharyngeal mass  Assessment & Plan  Cuffless trach placed 9/17, transitioned to cuffed on 10/3. Oxygen requirements not improving. For mental health patient is on buspirone, quetiapine, and sertraline. For pain, gabapentin, oxycodone scheduled and prn, prn oxycodone mainly utilized for increased pain during chemo and after a bronch. On Guaifenesin, Atrovent and Xopenex for airway maintenance. No improvement in bilateral consolidation or atelectasis and groundglass opacities on repeat CT and chest x-rays. Bronchial cx with 3 colonies CoNS. CTCAP 10/12:  indicates additional groundglass opacity with paraseptal emphysema, which may be contributing to inability to remain off vent. Bronchoscopy performed and unremarkable. Samples sent to lab for culture. Patient was placed back on vent s/p procedure. Now on high flow. Completed 7 day course of cefepime and Vancomycin. Bronchial culture and gram stain negative. Micro negative for CMV, AFB, Fungitell, Pneumocystis jiroveci (carinii), Histoplasma, Aspergillus. Trach secretions sent for sputum culture shows 2+ polys, 1+ gram-positive cocci in pairs, chains and clusters and 1+ gram-negative rods. 11/14 Discussion regarding possibilities of discharge done with family. Family shows understanding of patient's condition. Cuffless trach was placed 9/17. Replaced with a cuffed Shiley XLT #7 10/3    Plan:  Likely Chemo induced pneumonitis. Continue inhalation treatment with Pulmicort q12, duonebs QID, and symbicort q12. Continue trach collar during the day and at night. Continue her on dysphagia 2 diet. Wean down HFNC to a goal FiO2 of 50% to be discharged  Daily PT/OT.   Family does not want hospice. They want chemotherapy to continue. Patient would like to return home. CM will look into disposition to home. Large cell lymphoma ([address])  Assessment & Plan  Large B-cell lymphoma, stage II. First chemo cycle 9/22 4 days of R-EPOCH. 9/27 Rituxan. 9/28 Filgrastim. Received nivestym 300 mc 7 days dcd on 10/26      Prophylaxis:  Bactrim prophylaxis Monday Wednesday Friday,  Acyclovir 400 mg twice daily  Fluconazole 200 mg daily  Levofloxacin 250 mg daily  Lovenox 40 mg daily    9/22  4 days of R-EPOCH.  9/27 Rituxan  10/13 for [facility] cycle 2, which was done over 4 days and cytoxan on 10/19.  11/6 completed R-EPOCH cycle 3 and Cytoxan 11/7. Plan:  CT neck w wo contrast   Oncology is following. Next chemotherapy cycle will be planned after the size of the mass is assessed. Plan for repeat PET-CT upon hospital discharge to evaluate disease response. Transfuse blood products as needed. Keep Hgb>7 and Plt>20 for chemo   See acute respiratory failure above    Oropharyngeal mass  Assessment & Plan  9/14 Neck/ soft tissue CT: Large enhancing soft tissue mass identified within the left neck involving multiple spaces. Centered within the left oropharynx with extensions as described above into multiple spaces. This results in significant airway compromise. 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 Small level 3/level 4 nodes are seen within the neck. Tracheostomy by ENT. Replaced on 9/26. H/o tobacco abuse, daily smoker. On nicotine while inpatient, confirmed with patient she needs nicotine patch. CT soft tissue neck shows reduced mass effect from 9/14 to 10/13. Can consider reducing trach size if continues to improve. Patient often refusing peg tube feeds but is feeding herself small meals orally. Plan:  ENT and heme onc following  As per speech therapist diet  Dysphagia 2. Continue parallel PEG tube feeds for nutrition. See acute respiratory failure and large B cell lymphoma above.     Pancytopenia due to chemotherapy [facility])  Assessment & Plan  Patient received 1 PRBC transfusion on 10/5 and 10/25. Her B/L hemoglobin is 12-14. No sites of bleeding, no black stools. Iron panel indicative of inflammatory anemia. Normal Vitamin B12 levels. Folate is low 5.5. Low Hb 0.87. Filgrastim administered by heme/onc. Plan:  Trend hemoglobin and transfuse for <7. As per heme/onc transfuse irradiated and leukoreduced blood products. Trend platelets  Folic acid 1mg daily supplementation  As per my conversation with Heme/oncology attending, patient is expected to have chemotherapy associated pancytopenia. No further anemia w/u required. Low ANC management as per heme/onc    Hyperkalemia-resolved as of 11/10/2023  Assessment & Plan  Recent Labs     11/13/23  0507 11/14/23  0440   K 3.5 3.4*   MG 1.8* 2.1     Workup:  Etiology: Patient takes bactrim. Tumor lysis would be less likely since calcium is elevated rather than decreased. Uric acid is normal.   K+ elevated but returned to normal after medical management. Will give another round of medical management if elevated again. Plan:  Kdur 40 meq PO. After 2 hours, potassium chloride 20 meq IV. Trend potassium on daily BMP. PO (acute kidney injury) (HCC)-resolved as of 9/21/2023  Assessment & Plan  Lab Results   Component Value Date    CREATININE 1.01 11/14/2023    CREATININE 0.99 11/13/2023    CREATININE 1.24 11/12/2023     Likely prerenal.  Second to poor oral intake versus poor urine output. Baseline creatinine 1 to 1.2. Cr at baseline  Plan:  Urinary retention protocol, Daily weights, I/O  Trend Cr daily    RML pneumonia-resolved as of 9/22/2023  Assessment & Plan  9/13 CT PE study: Patchy consolidation right middle lobe, new from 8/25/2023, compatible with pneumonia. No leukocytosis, no fever/chills. Positive for sore throat, cough. New onset pneumonia after recent hospitalization and antibiotics treatment. 9/14: Bronchoscopy/ ABL.   MRSA negative. ABL revealed 2+ Growth of Candida albicans, suspected contaminant. Blister (nonthermal) of left forearm, sequela  Assessment & Plan  Blisters of left upper extremity healing, no signs of infection, continue daily wound care. Generalized abdominal pain  Assessment & Plan  Patient reports abdominal pain which is unchanged since 9/22 no guarding on exam. Takes Famotidine for GERD at home. Alk phos 10/2 145, 10/20 79. CTCAP reveals complex right upper pole renal lesion requiring possible outpatient MRI    Continue Famotidine  On bowel regimen with Senna and Miralax prn    (HFpEF) heart failure with preserved ejection fraction [facility])  Assessment & Plan  Wt Readings from Last 3 Encounters:   11/15/23 79.5 kg (175 lb 4.3 oz)   09/07/23 74.6 kg (164 lb 6.4 oz)   08/30/23 73.3 kg (161 lb 9.6 oz)     Lab Results   Component Value Date    LVEF 60 08/28/2023     (H) 10/28/2023     (H) 09/29/2023     Echo 8/28/23: LVEF 60%. Plan:  K > 4   Measure I/O      Ambulatory dysfunction  Assessment & Plan  2/2 Impacted by chronic pain syndrome + prolonged hospital stay. Continue OOB with PT. PT rec post acute rehab however reportedly Cannot get LTACH placement while getting chemo per CM  She is aware STR SNFs continue to follow and treatment can be done from a SNF level of care. PT would need to be on trach collar for at least 72 hours with no need for the vent. Aware that pt would need to be around 28% FiO2     Depression  Assessment & Plan  Patient on Zoloft 75 daily and Seroquel hs  Patient is depressed, multiple family/palliative discussions. patient is firm that she wants to live and to fight, she is just tired. Currently goal is disease treatment oriented. Full code. No SI/HI ideations. Palliative signed off, will reconsult if patient changes her mind regarding wishes. Chronic Superficial thrombophlebitis  Assessment & Plan  Right forearm soreness.   RUE US: No acute or chronic deep vein thrombosis. 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 HERIBERTO not severe enough to require renal dosing at this time, will continue to monitor and adjust dosing as needed. Continue DVT ppx with Lovenox 40    CKD (chronic kidney disease) stage 3, GFR 30-59 ml/min [facility])  Assessment & Plan  Lab Results   Component Value Date    EGFR 56 11/14/2023    EGFR 57 11/13/2023    EGFR 44 11/12/2023    CREATININE 1.01 11/14/2023    CREATININE 0.99 11/13/2023    CREATININE 1.24 11/12/2023     Baseline Cr between 0.75-1.1  Initial HERIBERTO felt 2/2 prerenal azotemia 2/2 diuresis, reduced PO intake +/- ATN. Patient received 2 doses of Bumex IV 2 g as she had not been responding appropriately to IV 40 Lasix daily. Creatinine went up by 0.5 meeting criteria for an HERIBERTO. Suspect most likely due to medications as a combination of prerenal and intrinsic. FENa was 0.2%, UA shows no casts or signs of infection, urine eosinophils 0%. Patient likely has prerenal HERIBERTO from volume depletion. Received 2 L NS and 1 pRBC and cr went up by 0.07 still and Na and Cl went down, suspect that volume prevented further cr rise and free water excretion impaired by kidney function, allowing hyponatremia to increase. Suspect that now that nephrotoxic medications have been stopped she responded well to Lasix and creatinine downtrended. HERIBERTO now resolved. Will be cautious about nephrotoxins and monitor as restarting EPOCH and ppx. Patient gets volume depleted and overloaded very easily. Especially from chemo. 10/19 Heriberto as cr went up by 0.3 in 48 hours from 0.82 on 10/17 to 1.22 on 10/19. Suspect this is prerenal hypovolemic, will see if patient improves with fluids. She gets overloaded easily so if no improvement suspect CRS again. 10/29 HERIBERTO cr went up by 0.3 again. She has 922 ml UOP in last 24 hours. HERIBERTO likely from lasix 40mg IV given yesterday. She is not hypovolemic. Plan:   Continue to monitor UO/renal function. Avoid nephrotoxic agents  Continue to monitor a.m. BMP. Pain syndrome, chronic  Assessment & Plan  Home regimen: Oxycodone 5 mg BID, Tylenol 650 mg q8 prn. Gabapentin 600 mg TID. Medical marijuana. Lumbar radiculopathy and impaired ambulatory dysfunction secondary to pain. Has bowel regimen and is having bowel movements daily. Patient required additional pain management during previous cycle and has some additional pain from tunneled line placement    Plan:  Continue gabapentin 300 mg TID, oxycodone 5 mg TID, prn Tylenol 650 mg q6. Oxycodone 5mg every 8 hours  Oxycodone 2.5 mg q6 PRN    Benign essential hypertension  Assessment & Plan  Home regimen Coreg 12.5 mg BID, Amlodipine 10 mg daily, and lisinopril 40 mg. All discontinued at this time secondary to hypotension and PO since cycle 1. but stable currently without any antihypertensives. Systolic persistently elevated and tachycardic, potentially secondary to pain. Patient was more hypotensive during last chemo. Coreg contraindicated during chemo, since cycles are every other week, would be better to stick with lopressor during duration of therapy as opposed to switching constantly. Plan:  Monitor vitals. Continue Norvasc 10 and lopressor 25 bid  Prn hydralazine for SBP > 160    Angioedema-resolved as of 11/4/2023  Assessment & Plan  POA in [place] ([place] ED: Swollen tongue, soft and hard palate with severe angioedema. Decadron 10 mg, Benadryl 25 mg given. 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 Plan:  Cuffless trach 9/17, cuffed Shiley XLT #7 10/3  See acute respiratory failure and oropharyngeal mass above    Hyperlipidemia-resolved as of 10/26/2023  Assessment & Plan  Lab Results   Component Value Date    CHOLESTEROL 118 10/09/2023    HDL 14 (L) 10/09/2023    TRIG 144 10/09/2023    [address] 104 10/09/2023     Continue outpatient diet and lifestyle modification.     Protein-calorie malnutrition ([address])  Assessment & Plan  Malnutrition Findings:   Adult Malnutrition type: Chronic illness  Adult Degree of Malnutrition: Unspecified protein calorie malnutrition  Malnutrition Characteristics: Inadequate energy, Other (comment) (stress of illness)                  360 Statement: Protein calorie malnutrition r/t inadequate intake as evidenced by >7 day period of poor intakes and stress of infection; currently treated with regular diet and nutrition supplements    BMI Findings: Body mass index is 33.52 kg/m². COPD without exacerbation (HCC)-resolved as of 9/26/2023  Assessment & Plan  Continue vent with nebs. Encephalopathy-resolved as of 9/21/2023  Assessment & Plan  9/19- Pt appeared to be AAO x3. Improving. ICU Core Measures     Vented Patient  VAP Bundle  VAP bundle ordered     A: Assess, Prevent, and Manage Pain Has pain been assessed? Yes  Need for changes to pain regimen? No   B: Both Spontaneous Awakening Trials (SATs) and Spontaneous Breathing Trials (SBTs) Plan to perform spontaneous awakening trial today? N/A   Plan to perform spontaneous breathing trial today? N/A   Obvious barriers to extubation? NA   C: Choice of Sedation RASS Goal: 0 Alert and Calm  Need for changes to sedation or analgesia regimen? NA   D: Delirium CAM-ICU: Negative   E: Early Mobility  Plan for early mobility? Yes   F: Family Engagement Plan for family engagement today? Yes         Antibiotic Review: patient on prophylactic antibiotics     Review of Invasive Devices:        Central access plan: Medications requiring central line       Prophylaxis:  VTE VTE covered by:  enoxaparin, Subcutaneous, 40 mg at 11/14/23 0843       Stress Ulcer  covered byfamotidine (PEPCID) tablet 20 mg [[id number]]         Significant 24hr Events     24hr events: Patient has been on      Subjective   Review of Systems   Constitutional:  Negative for chills and fever. HENT:  Negative for ear pain and sore throat. Eyes:  Negative for pain and visual disturbance. Respiratory:  Negative for cough and shortness of breath. Cardiovascular:  Negative for chest pain and palpitations. Gastrointestinal:  Negative for abdominal pain and vomiting. Genitourinary:  Negative for dysuria and hematuria. Musculoskeletal:  Negative for arthralgias and back pain. Skin:  Negative for color change and rash. Neurological:  Negative for seizures and syncope. All other systems reviewed and are negative. Objective                            Vitals I/O      Most Recent Min/Max in 24hrs   Temp 97.7 °F (36.5 °C) Temp  Min: 97.4 °F (36.3 °C)  Max: 98.7 °F (37.1 °C)   Pulse 93 Pulse  Min: 73  Max: 99   Resp 21 Resp  Min: 14  Max: 31   BP 93/54 BP  Min: 90/53  Max: 139/75   O2 Sat 96 % SpO2  Min: 91 %  Max: 100 %      Intake/Output Summary (Last 24 hours) at 11/15/2023 0658  Last data filed at 11/15/2023 0028  Gross per 24 hour   Intake 100 ml   Output 550 ml   Net -450 ml       Diet Regular; Regular House    Invasive Monitoring           Physical Exam   Physical Exam  Vitals and nursing note reviewed. Eyes:      Extraocular Movements: Extraocular movements intact. Pupils: Pupils are equal, round, and reactive to light. Skin:     General: Skin is warm and dry. HENT:      Head: Normocephalic and atraumatic. Cardiovascular:      Rate and Rhythm: Normal rate and regular rhythm. Musculoskeletal:         General: Normal range of motion. Abdominal:      Tenderness: There is no abdominal tenderness. Constitutional:       General: She is not in acute distress. Appearance: She is well-developed. She is not ill-appearing. Pulmonary:      Effort: Pulmonary effort is normal.      Breath sounds: No stridor. Rales present. No wheezing or rhonchi. Chest:      Chest wall: No tenderness. Neurological:      General: No focal deficit present. Mental Status: She is alert and oriented to person, place and time. Mental status is at baseline. She is calm. Diagnostic Studies      EKG: NSR  Imaging: N/A      Medications:  Scheduled PRN   acyclovir, 400 mg, BID  amLODIPine, 10 mg, Daily  budesonide, 0.5 mg, Q12H  busPIRone, 30 mg, TID  chlorhexidine, 15 mL, Q12H ASHLEY  enoxaparin, 40 mg, Q24H ASHLEY  famotidine, 20 mg, BID  fluconazole, 431 mg, Daily  folic acid, 1 mg, Daily  formoterol, 20 mcg, Q12H  furosemide, 40 mg, BID (diuretic)  gabapentin, 300 mg, TID  levalbuterol, 1.25 mg, Q6H   And  ipratropium, 0.5 mg, Q6H  melatonin, 3 mg, HS  metoprolol tartrate, 25 mg, Q12H ASHLEY  nicotine, 7 mg, Daily  oxyCODONE, 5 mg, Q8H  polyethylene glycol, 17 g, Daily  QUEtiapine, 50 mg, HS  senna, 17.6 mg, BID  sertraline, 75 mg, Daily  sodium chloride, 4 mL, TID  sulfamethoxazole-trimethoprim, 1 tablet, Once per day on Mon Wed Fri      alteplase, 2 mg, Q1MIN PRN  alteplase, 2 mg, Q1MIN PRN  alteplase, 2 mg, Q1MIN PRN  ondansetron, 4 mg, Q8H PRN  oxyCODONE, 2.5 mg, Q6H PRN       Continuous          Labs:    CBC    Recent Labs     11/13/23  0705 11/14/23  0440   WBC 0.87* 1.18*   HGB 7.3* 7.0*   HCT 22.7* 22.3*    158   BANDSPCT  --  1     BMP    Recent Labs     11/14/23  0440   SODIUM 144   K 3.4*      CO2 34*   AGAP 3   BUN 22   CREATININE 1.01   CALCIUM 9.6       Coags    No recent results     Additional Electrolytes  Recent Labs     11/14/23  0440   MG 2.1   PHOS 3.7          Blood Gas    No recent results  No recent results LFTs  No recent results    Infectious  No recent results  Glucose  Recent Labs     11/14/23  0440   GLUC 105                   Jose Alfredo Sosa MD

## 2023-11-15 NOTE — ASSESSMENT & PLAN NOTE
Wt Readings from Last 3 Encounters:   11/15/23 79.5 kg (175 lb 4.3 oz)   09/07/23 74.6 kg (164 lb 6.4 oz)   08/30/23 73.3 kg (161 lb 9.6 oz)     Lab Results   Component Value Date    LVEF 60 08/28/2023     (H) 10/28/2023     (H) 09/29/2023     Echo 8/28/23: LVEF 60%.        Plan:  K > 4   Measure I/O

## 2023-11-15 NOTE — ASSESSMENT & PLAN NOTE
Lab Results   Component Value Date    CHOLESTEROL 118 10/09/2023    HDL 14 (L) 10/09/2023    TRIG 144 10/09/2023    [address] 104 10/09/2023     Continue outpatient diet and lifestyle modification.

## 2023-11-15 NOTE — OCCUPATIONAL THERAPY NOTE
Occupational Therapy Cancelled Session    Patient Name: Trina VELAZQUEZKT'B Date: 11/15/2023     11/15/23 3835   Note Type   Note Type Treatment   Cancel Reasons Refusal  (Attempted to see pt for OT treatment session however pt PT Mary Elmore, pt declining participation this afternoon as she was just placed back on [address] [place] [address] and is not feeling up for it.  Will continue to follow and see pt as appropriate.)     Mirtha Lackey, MARGO, OTR/L  [place] License [id number]  [address] [place], [place] [id number]

## 2023-11-15 NOTE — ASSESSMENT & PLAN NOTE
Right forearm soreness. RUE US: No acute or chronic deep vein thrombosis. 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 PO not severe enough to require renal dosing at this time, will continue to monitor and adjust dosing as needed.       Continue DVT ppx with Lovenox 40 Cyclosporine Counseling:  I discussed with the patient the risks of cyclosporine including but not limited to hypertension, gingival hyperplasia,myelosuppression, immunosuppression, liver damage, kidney damage, neurotoxicity, lymphoma, and serious infections. The patient understands that monitoring is required including baseline blood pressure, CBC, CMP, lipid panel and uric acid, and then 1-2 times monthly CMP and blood pressure.

## 2023-11-15 NOTE — CASE MANAGEMENT
Case Management Progress Note    Patient name Smiley Melara  Location ICU 03/ICU 03 MRN [id number]  : 1955 Date 11/15/2023       LOS (days): 63  Geometric Mean LOS (GMLOS) (days): 26.10  Days to GMLOS:-36.9        OBJECTIVE:        Current admission status: Inpatient  Preferred Pharmacy:   [id number] [place] - [address] [facility]. [address] [facility] [address] [facility]   [place] [address]  Phone: [phone] Fax: [phone] [facility] [address] [place] [address]  Phone: [phone] Fax: [phone]    Primary Care Provider: Danny Giordano MD    Primary Insurance: [employer]   Secondary Insurance: [facility]    PROGRESS NOTE:    Weekly Care Management Length of Stay Review     Current LOS: 63 Days    Most Recent Labs:     Lab Results   Component Value Date/Time    WBC 1.40 (LL) 11/15/2023 06:41 AM    HGB 7.2 (L) 11/15/2023 06:41 AM    HCT 23.1 (L) 11/15/2023 06:41 AM     11/15/2023 06:41 AM    BANDSPCT 1 2023 04:40 AM    SODIUM 145 11/15/2023 06:41 AM    K 4.1 11/15/2023 06:41 AM     11/15/2023 06:41 AM    CO2 32 11/15/2023 06:41 AM    BUN 22 11/15/2023 06:41 AM    CREATININE 1.63 (H) 11/15/2023 06:41 AM    GLUC 118 11/15/2023 06:41 AM       Most Recent Vitals:   Vitals:    11/15/23 1500   BP: 99/56   Pulse: 104   Resp: 15   Temp: 99.1 °F (37.3 °C)   SpO2: 96%        Identified Barriers to Discharge/Discharge Goals/Care Management Interventions: Trac/peg, now on reg diet, Midflow NC, trac capped    Intended Discharge Disposition: Workingon securing supplies for home dc, with VNA and family support    Expected Discharge Date: TBD

## 2023-11-15 NOTE — PHYSICAL THERAPY NOTE
Physical Therapy Cancellation Note         11/15/23 1585   Note Type   Note type Cancelled Session   Cancel Reasons Refusal   Additional Comments Pt with active PT orders. Pt politely declining at this time secondary to fatigue. Pt stating she will be more than happy to work with PT/OT tomorrow. Will follow up as schedule allows.      Fernando Cannon, PT

## 2023-11-15 NOTE — ASSESSMENT & PLAN NOTE
Malnutrition Findings:   Adult Malnutrition type: Chronic illness  Adult Degree of Malnutrition: Unspecified protein calorie malnutrition  Malnutrition Characteristics: Inadequate energy, Other (comment) (stress of illness)                  360 Statement: Protein calorie malnutrition r/t inadequate intake as evidenced by >7 day period of poor intakes and stress of infection; currently treated with regular diet and nutrition supplements    BMI Findings: Body mass index is 33.52 kg/m².

## 2023-11-15 NOTE — ASSESSMENT & PLAN NOTE
POA in [place] ([place] ED: Swollen tongue, soft and hard palate with severe angioedema. Decadron 10 mg, Benadryl 25 mg given. 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     Plan:  Cuffless trach 9/17, cuffed Shiley XLT #7 10/3  See acute respiratory failure and oropharyngeal mass above

## 2023-11-15 NOTE — ASSESSMENT & PLAN NOTE
Large B-cell lymphoma, stage II. First chemo cycle 9/22 4 days of R-EPOCH. 9/27 Rituxan. 9/28 Filgrastim. Received nivestym 300 mc 7 days dcd on 10/26      Prophylaxis:  Bactrim prophylaxis Monday Wednesday Friday,  Acyclovir 400 mg twice daily  Fluconazole 200 mg daily  Levofloxacin 250 mg daily  Lovenox 40 mg daily    9/22  4 days of R-EPOCH.  9/27 Rituxan  10/13 for [facility] cycle 2, which was done over 4 days and cytoxan on 10/19.  11/6 completed R-EPOCH cycle 3 and Cytoxan 11/7. Plan:  CT neck w wo contrast   Oncology is following. Next chemotherapy cycle will be planned after the size of the mass is assessed. Plan for repeat PET-CT upon hospital discharge to evaluate disease response. Transfuse blood products as needed.   Keep Hgb>7 and Plt>20 for chemo   See acute respiratory failure above

## 2023-11-15 NOTE — ASSESSMENT & PLAN NOTE
Recent Labs     11/13/23  0507 11/14/23  0440   K 3.5 3.4*   MG 1.8* 2.1     Workup:  Etiology: Patient takes bactrim. Tumor lysis would be less likely since calcium is elevated rather than decreased. Uric acid is normal.   K+ elevated but returned to normal after medical management. Will give another round of medical management if elevated again. Plan:  Kdur 40 meq PO. After 2 hours, potassium chloride 20 meq IV. Trend potassium on daily BMP.

## 2023-11-15 NOTE — ASSESSMENT & PLAN NOTE
Cuffless trach placed 9/17, transitioned to cuffed on 10/3. Oxygen requirements not improving. For mental health patient is on buspirone, quetiapine, and sertraline. For pain, gabapentin, oxycodone scheduled and prn, prn oxycodone mainly utilized for increased pain during chemo and after a bronch. On Guaifenesin, Atrovent and Xopenex for airway maintenance. No improvement in bilateral consolidation or atelectasis and groundglass opacities on repeat CT and chest x-rays. Bronchial cx with 3 colonies CoNS. CTCAP 10/12:  indicates additional groundglass opacity with paraseptal emphysema, which may be contributing to inability to remain off vent. Bronchoscopy performed and unremarkable. Samples sent to lab for culture. Patient was placed back on vent s/p procedure. Now on high flow. Completed 7 day course of cefepime and Vancomycin. Bronchial culture and gram stain negative. Micro negative for CMV, AFB, Fungitell, Pneumocystis jiroveci (carinii), Histoplasma, Aspergillus. Trach secretions sent for sputum culture shows 2+ polys, 1+ gram-positive cocci in pairs, chains and clusters and 1+ gram-negative rods. 11/14 Discussion regarding possibilities of discharge done with family. Family shows understanding of patient's condition. Cuffless trach was placed 9/17. Replaced with a cuffed Shiley XLT #7 10/3    Plan:  Likely Chemo induced pneumonitis. Continue inhalation treatment with Pulmicort q12, duonebs QID, and symbicort q12. Continue trach collar during the day and at night. Continue her on dysphagia 2 diet. Wean down HFNC to a goal FiO2 of 50% to be discharged  Daily PT/OT. Family does not want hospice. They want chemotherapy to continue. Patient would like to return home. CM will look into disposition to home.

## 2023-11-16 PROBLEM — I82.C11 ACUTE EMBOLISM AND THROMBOSIS OF RIGHT INTERNAL JUGULAR VEIN (HCC): Status: ACTIVE | Noted: 2023-11-16

## 2023-11-16 LAB
ANION GAP SERPL CALCULATED.3IONS-SCNC: 7 MMOL/L
ANISOCYTOSIS BLD QL SMEAR: PRESENT
BASE EX.OXY STD BLDV CALC-SCNC: 56.2 % (ref 60–80)
BASE EXCESS BLDV CALC-SCNC: 3.9 MMOL/L
BASOPHILS # BLD MANUAL: 0 THOUSAND/UL (ref 0–0.1)
BASOPHILS NFR MAR MANUAL: 0 % (ref 0–1)
BUN SERPL-MCNC: 25 MG/DL (ref 5–25)
CALCIUM SERPL-MCNC: 9.7 MG/DL (ref 8.4–10.2)
CHLORIDE SERPL-SCNC: 105 MMOL/L (ref 96–108)
CO2 SERPL-SCNC: 32 MMOL/L (ref 21–32)
CREAT SERPL-MCNC: 1.8 MG/DL (ref 0.6–1.3)
DME PARACHUTE DELIVERY DATE REQUESTED: NORMAL
DME PARACHUTE DELIVERY DATE REQUESTED: NORMAL
DME PARACHUTE DELIVERY NOTE: NORMAL
DME PARACHUTE ITEM DESCRIPTION: NORMAL
DME PARACHUTE ORDER STATUS: NORMAL
DME PARACHUTE ORDER STATUS: NORMAL
DME PARACHUTE SUPPLIER NAME: NORMAL
DME PARACHUTE SUPPLIER NAME: NORMAL
DME PARACHUTE SUPPLIER PHONE: NORMAL
DME PARACHUTE SUPPLIER PHONE: NORMAL
EOSINOPHIL # BLD MANUAL: 0 THOUSAND/UL (ref 0–0.4)
EOSINOPHIL NFR BLD MANUAL: 0 % (ref 0–6)
ERYTHROCYTE [DISTWIDTH] IN BLOOD BY AUTOMATED COUNT: 15.9 % (ref 11.6–15.1)
GFR SERPL CREATININE-BSD FRML MDRD: 28 ML/MIN/1.73SQ M
GLUCOSE SERPL-MCNC: 106 MG/DL (ref 65–140)
HCO3 BLDV-SCNC: 29.6 MMOL/L (ref 24–30)
HCT VFR BLD AUTO: 22.2 % (ref 34.8–46.1)
HGB BLD-MCNC: 6.9 G/DL (ref 11.5–15.4)
HGB BLD-MCNC: 7.3 G/DL (ref 11.5–15.4)
HYPERCHROMIA BLD QL SMEAR: PRESENT
LYMPHOCYTES # BLD AUTO: 0.95 THOUSAND/UL (ref 0.6–4.47)
LYMPHOCYTES # BLD AUTO: 47 % (ref 14–44)
MAGNESIUM SERPL-MCNC: 1.9 MG/DL (ref 1.9–2.7)
MCH RBC QN AUTO: 30.5 PG (ref 26.8–34.3)
MCHC RBC AUTO-ENTMCNC: 30.5 G/DL (ref 31.4–37.4)
MCV RBC AUTO: 100 FL (ref 82–98)
MICROCYTES BLD QL AUTO: PRESENT
MONOCYTES # BLD AUTO: 0.24 THOUSAND/UL (ref 0–1.22)
MONOCYTES NFR BLD: 12 % (ref 4–12)
NASAL CANNULA: 8
NEUTROPHILS # BLD MANUAL: 0.83 THOUSAND/UL (ref 1.85–7.62)
NEUTS BAND NFR BLD MANUAL: 20 % (ref 0–8)
NEUTS SEG NFR BLD AUTO: 21 % (ref 43–75)
O2 CT BLDV-SCNC: 6.4 ML/DL
PCO2 BLDV: 50.8 MM HG (ref 42–50)
PH BLDV: 7.38 [PH] (ref 7.3–7.4)
PLATELET # BLD AUTO: 151 THOUSANDS/UL (ref 149–390)
PLATELET BLD QL SMEAR: ADEQUATE
PMV BLD AUTO: 9.8 FL (ref 8.9–12.7)
PO2 BLDV: 31.1 MM HG (ref 35–45)
POLYCHROMASIA BLD QL SMEAR: PRESENT
POTASSIUM SERPL-SCNC: 4.3 MMOL/L (ref 3.5–5.3)
RBC # BLD AUTO: 2.26 MILLION/UL (ref 3.81–5.12)
RBC MORPH BLD: PRESENT
SODIUM SERPL-SCNC: 144 MMOL/L (ref 135–147)
WBC # BLD AUTO: 2.03 THOUSAND/UL (ref 4.31–10.16)

## 2023-11-16 PROCEDURE — 97168 OT RE-EVAL EST PLAN CARE: CPT

## 2023-11-16 PROCEDURE — 94003 VENT MGMT INPAT SUBQ DAY: CPT

## 2023-11-16 PROCEDURE — 99232 SBSQ HOSP IP/OBS MODERATE 35: CPT | Performed by: INTERNAL MEDICINE

## 2023-11-16 PROCEDURE — 85027 COMPLETE CBC AUTOMATED: CPT

## 2023-11-16 PROCEDURE — 85007 BL SMEAR W/DIFF WBC COUNT: CPT

## 2023-11-16 PROCEDURE — 97164 PT RE-EVAL EST PLAN CARE: CPT

## 2023-11-16 PROCEDURE — 94760 N-INVAS EAR/PLS OXIMETRY 1: CPT

## 2023-11-16 PROCEDURE — 94640 AIRWAY INHALATION TREATMENT: CPT

## 2023-11-16 PROCEDURE — 97535 SELF CARE MNGMENT TRAINING: CPT

## 2023-11-16 PROCEDURE — 82805 BLOOD GASES W/O2 SATURATION: CPT | Performed by: INTERNAL MEDICINE

## 2023-11-16 PROCEDURE — 97116 GAIT TRAINING THERAPY: CPT

## 2023-11-16 PROCEDURE — 83735 ASSAY OF MAGNESIUM: CPT

## 2023-11-16 PROCEDURE — 85018 HEMOGLOBIN: CPT

## 2023-11-16 PROCEDURE — 80048 BASIC METABOLIC PNL TOTAL CA: CPT

## 2023-11-16 RX ORDER — SODIUM CHLORIDE, SODIUM GLUCONATE, SODIUM ACETATE, POTASSIUM CHLORIDE, MAGNESIUM CHLORIDE, SODIUM PHOSPHATE, DIBASIC, AND POTASSIUM PHOSPHATE .53; .5; .37; .037; .03; .012; .00082 G/100ML; G/100ML; G/100ML; G/100ML; G/100ML; G/100ML; G/100ML
500 INJECTION, SOLUTION INTRAVENOUS ONCE
Status: COMPLETED | OUTPATIENT
Start: 2023-11-16 | End: 2023-11-16

## 2023-11-16 RX ORDER — MAGNESIUM SULFATE HEPTAHYDRATE 40 MG/ML
2 INJECTION, SOLUTION INTRAVENOUS ONCE
Status: DISCONTINUED | OUTPATIENT
Start: 2023-11-16 | End: 2023-11-16

## 2023-11-16 RX ADMIN — OXYCODONE HYDROCHLORIDE 5 MG: 5 SOLUTION ORAL at 20:39

## 2023-11-16 RX ADMIN — GABAPENTIN 300 MG: 300 CAPSULE ORAL at 16:09

## 2023-11-16 RX ADMIN — LEVALBUTEROL HYDROCHLORIDE 1.25 MG: 1.25 SOLUTION RESPIRATORY (INHALATION) at 20:46

## 2023-11-16 RX ADMIN — OXYCODONE HYDROCHLORIDE 5 MG: 5 SOLUTION ORAL at 12:16

## 2023-11-16 RX ADMIN — CHLORHEXIDINE GLUCONATE 15 ML: 1.2 SOLUTION ORAL at 20:40

## 2023-11-16 RX ADMIN — CHLORHEXIDINE GLUCONATE 15 ML: 1.2 SOLUTION ORAL at 08:47

## 2023-11-16 RX ADMIN — BUSPIRONE HYDROCHLORIDE 30 MG: 10 TABLET ORAL at 20:40

## 2023-11-16 RX ADMIN — GABAPENTIN 300 MG: 300 CAPSULE ORAL at 08:47

## 2023-11-16 RX ADMIN — QUETIAPINE FUMARATE 50 MG: 25 TABLET ORAL at 20:39

## 2023-11-16 RX ADMIN — METOPROLOL TARTRATE 25 MG: 25 TABLET, FILM COATED ORAL at 20:41

## 2023-11-16 RX ADMIN — FORMOTEROL FUMARATE DIHYDRATE 20 MCG: 20 SOLUTION RESPIRATORY (INHALATION) at 20:46

## 2023-11-16 RX ADMIN — MELATONIN 3 MG: at 20:39

## 2023-11-16 RX ADMIN — BUSPIRONE HYDROCHLORIDE 30 MG: 10 TABLET ORAL at 08:46

## 2023-11-16 RX ADMIN — SODIUM CHLORIDE SOLN NEBU 3% 4 ML: 3 NEBU SOLN at 13:51

## 2023-11-16 RX ADMIN — FAMOTIDINE 20 MG: 20 TABLET, FILM COATED ORAL at 17:30

## 2023-11-16 RX ADMIN — METOPROLOL TARTRATE 25 MG: 25 TABLET, FILM COATED ORAL at 08:47

## 2023-11-16 RX ADMIN — GABAPENTIN 300 MG: 300 CAPSULE ORAL at 20:40

## 2023-11-16 RX ADMIN — OXYCODONE HYDROCHLORIDE 5 MG: 5 SOLUTION ORAL at 05:42

## 2023-11-16 RX ADMIN — BUSPIRONE HYDROCHLORIDE 30 MG: 10 TABLET ORAL at 16:09

## 2023-11-16 RX ADMIN — FOLIC ACID 1 MG: 1 TABLET ORAL at 08:47

## 2023-11-16 RX ADMIN — FILGRASTIM-AAFI 300 MCG: 300 INJECTION, SOLUTION SUBCUTANEOUS at 16:09

## 2023-11-16 RX ADMIN — SODIUM CHLORIDE SOLN NEBU 3% 4 ML: 3 NEBU SOLN at 20:46

## 2023-11-16 RX ADMIN — ACYCLOVIR 400 MG: 200 CAPSULE ORAL at 17:30

## 2023-11-16 RX ADMIN — IPRATROPIUM BROMIDE 0.5 MG: 0.5 SOLUTION RESPIRATORY (INHALATION) at 13:51

## 2023-11-16 RX ADMIN — ACYCLOVIR 400 MG: 200 CAPSULE ORAL at 08:46

## 2023-11-16 RX ADMIN — BUDESONIDE 0.5 MG: 0.5 INHALANT ORAL at 20:46

## 2023-11-16 RX ADMIN — LEVALBUTEROL HYDROCHLORIDE 1.25 MG: 1.25 SOLUTION RESPIRATORY (INHALATION) at 07:39

## 2023-11-16 RX ADMIN — FAMOTIDINE 20 MG: 20 TABLET, FILM COATED ORAL at 08:47

## 2023-11-16 RX ADMIN — FORMOTEROL FUMARATE DIHYDRATE 20 MCG: 20 SOLUTION RESPIRATORY (INHALATION) at 07:39

## 2023-11-16 RX ADMIN — BUDESONIDE 0.5 MG: 0.5 INHALANT ORAL at 07:39

## 2023-11-16 RX ADMIN — SODIUM CHLORIDE, SODIUM GLUCONATE, SODIUM ACETATE, POTASSIUM CHLORIDE, MAGNESIUM CHLORIDE, SODIUM PHOSPHATE, DIBASIC, AND POTASSIUM PHOSPHATE 500 ML: .53; .5; .37; .037; .03; .012; .00082 INJECTION, SOLUTION INTRAVENOUS at 09:56

## 2023-11-16 RX ADMIN — APIXABAN 10 MG: 5 TABLET, FILM COATED ORAL at 20:39

## 2023-11-16 RX ADMIN — FLUCONAZOLE 400 MG: 100 TABLET ORAL at 08:47

## 2023-11-16 RX ADMIN — APIXABAN 10 MG: 5 TABLET, FILM COATED ORAL at 08:46

## 2023-11-16 RX ADMIN — SERTRALINE 75 MG: 25 TABLET, FILM COATED ORAL at 08:47

## 2023-11-16 RX ADMIN — LEVALBUTEROL HYDROCHLORIDE 1.25 MG: 1.25 SOLUTION RESPIRATORY (INHALATION) at 13:51

## 2023-11-16 RX ADMIN — IPRATROPIUM BROMIDE 0.5 MG: 0.5 SOLUTION RESPIRATORY (INHALATION) at 07:38

## 2023-11-16 RX ADMIN — NICOTINE 7 MG: 7 PATCH, EXTENDED RELEASE TRANSDERMAL at 08:47

## 2023-11-16 RX ADMIN — SODIUM CHLORIDE SOLN NEBU 3% 4 ML: 3 NEBU SOLN at 07:39

## 2023-11-16 RX ADMIN — AMLODIPINE BESYLATE 10 MG: 5 TABLET ORAL at 08:46

## 2023-11-16 RX ADMIN — IPRATROPIUM BROMIDE 0.5 MG: 0.5 SOLUTION RESPIRATORY (INHALATION) at 20:46

## 2023-11-16 NOTE — ASSESSMENT & PLAN NOTE
POA in [facility] ED: Swollen tongue, soft and hard palate with severe angioedema. Decadron 10 mg, Benadryl 25 mg given. 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     Plan:  Cuffless trach 9/17, cuffed Shiley XLT #7 10/3  See acute respiratory failure and oropharyngeal mass above

## 2023-11-16 NOTE — PROGRESS NOTES
[address] [facility]  Progress Note  Name: Dion Sahu  MRN: [id number]  Unit/Bed#: ICU 03 I Date of Admission: 9/13/2023   Date of Service: 11/16/2023 I Hospital Day: 59    Assessment/Plan   * Acute respiratory failure with hypoxia secondary to oropharyngeal mass  Assessment & Plan  Cuffless trach placed 9/17, transitioned to cuffed on 10/3. Oxygen requirements not improving. For mental health patient is on buspirone, quetiapine, and sertraline. For pain, gabapentin, oxycodone scheduled and prn, prn oxycodone mainly utilized for increased pain during chemo and after a bronch. On Guaifenesin, Atrovent and Xopenex for airway maintenance. No improvement in bilateral consolidation or atelectasis and groundglass opacities on repeat CT and chest x-rays. Bronchial cx with 3 colonies CoNS. CTCAP 10/12:  indicates additional groundglass opacity with paraseptal emphysema, which may be contributing to inability to remain off vent. Bronchoscopy performed and unremarkable. Samples sent to lab for culture. Patient was placed back on vent s/p procedure. Now on high flow. Completed 7 day course of cefepime and Vancomycin. Bronchial culture and gram stain negative. Micro negative for CMV, AFB, Fungitell, Pneumocystis jiroveci (carinii), Histoplasma, Aspergillus. Trach secretions sent for sputum culture shows 2+ polys, 1+ gram-positive cocci in pairs, chains and clusters and 1+ gram-negative rods. 11/14 Discussion regarding possibilities of discharge done with family. Family shows understanding of patient's condition. 11/15 Trach is capped and patient is placed on 3 L of NC. Cuffless trach was placed 9/17. Replaced with a cuffed Shiley XLT #7 10/3    Plan:  Likely Chemo induced pneumonitis. Continue inhalation treatment with Pulmicort and formoterol q12 , duonebs QID. Continue trach collar during the day and at night. Continue her on dysphagia 2 diet. Daily PT/OT.   Family does not want hospice. They want chemotherapy to continue. Patient would like to return home. CM will look into disposition to home. Large cell lymphoma ([address])  Assessment & Plan  Large B-cell lymphoma, stage II. First chemo cycle 9/22 4 days of R-EPOCH. 9/27 Rituxan. 9/28 Filgrastim. Received nivestym 300 mc 7 days dcd on 10/26      Prophylaxis:  Bactrim prophylaxis Monday Wednesday Friday,  Acyclovir 400 mg twice daily  Fluconazole 200 mg daily  Levofloxacin 250 mg daily  Lovenox 40 mg daily    9/22  4 days of R-EPOCH.  9/27 Rituxan  10/13 for [facility] cycle 2, which was done over 4 days and cytoxan on 10/19.  11/6 completed R-EPOCH cycle 3 and Cytoxan 11/7. Plan:  CT neck w wo contrast   Oncology is following. Next chemotherapy cycle will be planned after the size of the mass is assessed. Plan for repeat PET-CT upon hospital discharge to evaluate disease response. Transfuse blood products as needed. Keep Hgb>7 and Plt>20 for chemo   See acute respiratory failure above    Acute embolism and thrombosis of right internal jugular vein (HCC)  Assessment & Plan  CT soft tissues of the neck: Nonocclusive thrombus noted within the right internal jugular vein just above the Mediport entrance site into the internal jugular vein. No signs of pain, headaches, vision changes. Plan:  Eliquis 10 mg for 7 days. Switch to Eliquis 5 mg starting 11/22. Oropharyngeal mass  Assessment & Plan  9/14 Neck/ soft tissue CT: Large enhancing soft tissue mass identified within the left neck involving multiple spaces. Centered within the left oropharynx with extensions as described above into multiple spaces. This results in significant airway compromise. 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 Small level 3/level 4 nodes are seen within the neck. Tracheostomy by ENT. Replaced on 9/26. H/o tobacco abuse, daily smoker. On nicotine while inpatient, confirmed with patient she needs nicotine patch.  CT soft tissue neck shows reduced mass effect from 9/14 to 10/13. Can consider reducing trach size if continues to improve. Patient often refusing peg tube feeds but is feeding herself small meals orally. Plan:  ENT and heme onc following  As per speech therapist diet  Dysphagia 2. Continue parallel PEG tube feeds for nutrition. See acute respiratory failure and large B cell lymphoma above. Pancytopenia due to chemotherapy [facility])  Assessment & Plan  Patient received 1 PRBC transfusion on 10/5 and 10/25. Her B/L hemoglobin is 12-14. No sites of bleeding, no black stools. Iron panel indicative of inflammatory anemia. Normal Vitamin B12 levels. Folate is low 5.5. Low Hb 0.87. Filgrastim administered by heme/onc. Plan:  Trend hemoglobin and transfuse for <7. As per heme/onc transfuse irradiated and leukoreduced blood products. Trend platelets  Folic acid 1mg daily supplementation  As per my conversation with Heme/oncology attending, patient is expected to have chemotherapy associated pancytopenia. No further anemia w/u required. Low ANC management as per heme/onc    Hyperkalemia-resolved as of 11/10/2023  Assessment & Plan  Recent Labs     11/14/23  0440 11/15/23  0641 11/16/23  0550   K 3.4* 4.1 4.3   MG 2.1 1.8* 1.9     Workup:  Etiology: Patient takes bactrim. Tumor lysis would be less likely since calcium is elevated rather than decreased. Uric acid is normal.   K+ elevated but returned to normal after medical management. Will give another round of medical management if elevated again. Plan:  Kdur 40 meq PO. After 2 hours, potassium chloride 20 meq IV. Trend potassium on daily BMP. PO (acute kidney injury) (HCC)-resolved as of 9/21/2023  Assessment & Plan  Lab Results   Component Value Date    CREATININE 1.80 (H) 11/16/2023    CREATININE 1.63 (H) 11/15/2023    CREATININE 1.01 11/14/2023     Likely prerenal.  Second to poor oral intake versus poor urine output. Baseline creatinine 1 to 1.2.     Cr at baseline  Plan:  Urinary retention protocol, Daily weights, I/O  Trend Cr daily    RML pneumonia-resolved as of 9/22/2023  Assessment & Plan  9/13 CT PE study: Patchy consolidation right middle lobe, new from 8/25/2023, compatible with pneumonia. No leukocytosis, no fever/chills. Positive for sore throat, cough. New onset pneumonia after recent hospitalization and antibiotics treatment. 9/14: Bronchoscopy/ ABL. MRSA negative. ABL revealed 2+ Growth of Candida albicans, suspected contaminant. Blister (nonthermal) of left forearm, sequela  Assessment & Plan  Blisters of left upper extremity healing, no signs of infection, continue daily wound care. Generalized abdominal pain  Assessment & Plan  Patient reports abdominal pain which is unchanged since 9/22 no guarding on exam. Takes Famotidine for GERD at home. Alk phos 10/2 145, 10/20 79. CTCAP reveals complex right upper pole renal lesion requiring possible outpatient MRI    Continue Famotidine  On bowel regimen with Senna and Miralax prn    (HFpEF) heart failure with preserved ejection fraction [facility])  Assessment & Plan  Wt Readings from Last 3 Encounters:   11/16/23 79 kg (174 lb 2.6 oz)   09/07/23 74.6 kg (164 lb 6.4 oz)   08/30/23 73.3 kg (161 lb 9.6 oz)     Lab Results   Component Value Date    LVEF 60 08/28/2023     (H) 10/28/2023     (H) 09/29/2023     Echo 8/28/23: LVEF 60%. Plan:  K > 4   Measure I/O      Ambulatory dysfunction  Assessment & Plan  2/2 Impacted by chronic pain syndrome + prolonged hospital stay. Continue OOB with PT. PT rec post acute rehab however reportedly Cannot get LTACH placement while getting chemo per CM  She is aware STR SNFs continue to follow and treatment can be done from a SNF level of care. PT would need to be on trach collar for at least 72 hours with no need for the vent.  Aware that pt would need to be around 28% FiO2     Depression  Assessment & Plan  Patient on Zoloft 75 daily and Seroquel hs  Patient is depressed, multiple family/palliative discussions. patient is firm that she wants to live and to fight, she is just tired. Currently goal is disease treatment oriented. Full code. No SI/HI ideations. Palliative signed off, will reconsult if patient changes her mind regarding wishes. Chronic Superficial thrombophlebitis  Assessment & Plan  Right forearm soreness. RUE US: No acute or chronic deep vein thrombosis. 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 PO not severe enough to require renal dosing at this time, will continue to monitor and adjust dosing as needed. Continue DVT ppx with Lovenox 40    CKD (chronic kidney disease) stage 3, GFR 30-59 ml/min [facility])  Assessment & Plan  Lab Results   Component Value Date    EGFR 28 11/16/2023    EGFR 31 11/15/2023    EGFR 56 11/14/2023    CREATININE 1.80 (H) 11/16/2023    CREATININE 1.63 (H) 11/15/2023    CREATININE 1.01 11/14/2023     Baseline Cr between 0.75-1.1  Initial PO felt 2/2 prerenal azotemia 2/2 diuresis, reduced PO intake +/- ATN. Patient received 2 doses of Bumex IV 2 g as she had not been responding appropriately to IV 40 Lasix daily. Creatinine went up by 0.5 meeting criteria for an PO. Suspect most likely due to medications as a combination of prerenal and intrinsic. FENa was 0.2%, UA shows no casts or signs of infection, urine eosinophils 0%. Patient likely has prerenal PO from volume depletion. Received 2 L NS and 1 pRBC and cr went up by 0.07 still and Na and Cl went down, suspect that volume prevented further cr rise and free water excretion impaired by kidney function, allowing hyponatremia to increase. Suspect that now that nephrotoxic medications have been stopped she responded well to Lasix and creatinine downtrended. PO now resolved. Will be cautious about nephrotoxins and monitor as restarting EPOCH and ppx. Patient gets volume depleted and overloaded very easily. Especially from chemo. 10/19 Heriberto as cr went up by 0.3 in 48 hours from 0.82 on 10/17 to 1.22 on 10/19. Suspect this is prerenal hypovolemic, will see if patient improves with fluids. She gets overloaded easily so if no improvement suspect CRS again. 10/29 HERIBERTO cr went up by 0.3 again. She has 922 ml UOP in last 24 hours. HERIBERTO likely from lasix 40mg IV given yesterday. She is not hypovolemic. Plan:   Continue to monitor UO/renal function. Avoid nephrotoxic agents  Continue to monitor a.m. BMP. Pain syndrome, chronic  Assessment & Plan  Home regimen: Oxycodone 5 mg BID, Tylenol 650 mg q8 prn. Gabapentin 600 mg TID. Medical marijuana. Lumbar radiculopathy and impaired ambulatory dysfunction secondary to pain. Has bowel regimen and is having bowel movements daily. Patient required additional pain management during previous cycle and has some additional pain from tunneled line placement    Plan:  Continue gabapentin 300 mg TID, oxycodone 5 mg TID, prn Tylenol 650 mg q6. Oxycodone 5mg every 8 hours  Oxycodone 2.5 mg q6 PRN    Benign essential hypertension  Assessment & Plan  Home regimen Coreg 12.5 mg BID, Amlodipine 10 mg daily, and lisinopril 40 mg. All discontinued at this time secondary to hypotension and HERIBERTO since cycle 1. but stable currently without any antihypertensives. Systolic persistently elevated and tachycardic, potentially secondary to pain. Patient was more hypotensive during last chemo. Coreg contraindicated during chemo, since cycles are every other week, would be better to stick with lopressor during duration of therapy as opposed to switching constantly. Plan:  Monitor vitals. Continue Norvasc 10 and lopressor 25 bid  Prn hydralazine for SBP > 160    Angioedema-resolved as of 11/4/2023  Assessment & Plan  POA in [facility] ED: Swollen tongue, soft and hard palate with severe angioedema. Decadron 10 mg, Benadryl 25 mg given. 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     Plan:  Cuffless trach 9/17, cuffed Shiley XLT #7 10/3  See acute respiratory failure and oropharyngeal mass above    Hyperlipidemia-resolved as of 10/26/2023  Assessment & Plan  Lab Results   Component Value Date    CHOLESTEROL 118 10/09/2023    HDL 14 (L) 10/09/2023    TRIG 144 10/09/2023    [address] [facility] 104 10/09/2023     Continue outpatient diet and lifestyle modification. Protein-calorie malnutrition ([address])  Assessment & Plan  Malnutrition Findings:   Adult Malnutrition type: Chronic illness  Adult Degree of Malnutrition: Unspecified protein calorie malnutrition  Malnutrition Characteristics: Inadequate energy, Other (comment) (stress of illness)                  360 Statement: Protein calorie malnutrition r/t inadequate intake as evidenced by >7 day period of poor intakes and stress of infection; currently treated with regular diet and nutrition supplements    BMI Findings: Body mass index is 33.31 kg/m². COPD without exacerbation (HCC)-resolved as of 9/26/2023  Assessment & Plan  Continue vent with nebs. Encephalopathy-resolved as of 9/21/2023  Assessment & Plan  9/19- Pt appeared to be AAO x3. Improving. ICU Core Measures     Vented Patient  VAP Bundle  VAP bundle ordered     A: Assess, Prevent, and Manage Pain Has pain been assessed? Yes  Need for changes to pain regimen? No   B: Both Spontaneous Awakening Trials (SATs) and Spontaneous Breathing Trials (SBTs) Plan to perform spontaneous awakening trial today? N/A   Plan to perform spontaneous breathing trial today? N/A   Obvious barriers to extubation? NA   C: Choice of Sedation RASS Goal: 0 Alert and Calm  Need for changes to sedation or analgesia regimen? NA   D: Delirium CAM-ICU: Negative   E: Early Mobility  Plan for early mobility? Yes   F: Family Engagement Plan for family engagement today?  Yes        Antibiotic Review: patient on prophylactic antibiotics     Review of Invasive Devices:        Central access plan: Medications requiring central line      Prophylaxis:  VTE VTE covered by:  apixaban, Oral, 10 mg at 11/15/23 2149  [START ON 11/22/2023] apixaban, Oral       Stress Ulcer  covered byfamotidine (PEPCID) tablet 20 mg [[id number]]         Significant 24hr Events     24hr events: She was placed on oxygen via nasal cannula and trach is capped. Overnight she did not desaturate. Subjective   Review of Systems   All other systems reviewed and are negative. Objective                            Vitals I/O      Most Recent Min/Max in 24hrs   Temp 99 °F (37.2 °C) Temp  Min: 98 °F (36.7 °C)  Max: 99.4 °F (37.4 °C)   Pulse 98 Pulse  Min: 86  Max: 117   Resp 16 Resp  Min: 15  Max: 22   BP 98/53 BP  Min: 87/53  Max: 128/68   O2 Sat (!) 89 % SpO2  Min: 89 %  Max: 100 %      Intake/Output Summary (Last 24 hours) at 11/16/2023 0756  Last data filed at 11/15/2023 2344  Gross per 24 hour   Intake --   Output 800 ml   Net -800 ml       Diet Regular; Regular House    Invasive Monitoring           Physical Exam   Physical Exam  Vitals and nursing note reviewed. Eyes:      Extraocular Movements: Extraocular movements intact. Pupils: Pupils are equal, round, and reactive to light. HENT:      Head: Normocephalic and atraumatic. Neck:      Trachea: Tracheostomy present. Pulmonary:      Effort: Pulmonary effort is normal. No respiratory distress. Comments: Breathing via NC. Neurological:      Mental Status: She is alert. Rest of the physical exam was deferred at this time as patient did not want to be examined. Diagnostic Studies      EKG: NSR  Imaging: CT soft tissue neck w contrast. Improved ill-defined mass centered along the left nasopharynx and oropharynx with regional extension of disease. Shows Nonocclusive thrombus within the right internal jugular vein just above the entrance site of the Mediport catheter into the internal jugular vein. I have personally reviewed pertinent reports.    and I have personally reviewed pertinent films in PACS     Medications:  Scheduled PRN   acyclovir, 400 mg, BID  amLODIPine, 10 mg, Daily  apixaban, 10 mg, BID  [START ON 11/22/2023] apixaban, 5 mg, BID  budesonide, 0.5 mg, Q12H  busPIRone, 30 mg, TID  chlorhexidine, 15 mL, Q12H ASHLEY  famotidine, 20 mg, BID  fluconazole, 380 mg, Daily  folic acid, 1 mg, Daily  formoterol, 20 mcg, Q12H  gabapentin, 300 mg, TID  levalbuterol, 1.25 mg, Q6H   And  ipratropium, 0.5 mg, Q6H  melatonin, 3 mg, HS  metoprolol tartrate, 25 mg, Q12H [address]  nicotine, 7 mg, Daily  oxyCODONE, 5 mg, Q8H  polyethylene glycol, 17 g, Daily  QUEtiapine, 50 mg, HS  senna, 17.6 mg, BID  sertraline, 75 mg, Daily  sodium chloride, 4 mL, TID  sulfamethoxazole-trimethoprim, 1 tablet, Once per day on Mon Wed Fri      alteplase, 2 mg, Q1MIN PRN  alteplase, 2 mg, Q1MIN PRN  alteplase, 2 mg, Q1MIN PRN  ondansetron, 4 mg, Q8H PRN  oxyCODONE, 2.5 mg, Q6H PRN       Continuous          Labs:    CBC    Recent Labs     11/15/23  0641 11/16/23  0550   WBC 1.40* 2.03*   HGB 7.2* 6.9*   HCT 23.1* 22.2*    151   BANDSPCT  --  20*     BMP    Recent Labs     11/15/23  0641 11/16/23  0550   SODIUM 145 144   K 4.1 4.3    105   CO2 32 32   AGAP 7 7   BUN 22 25   CREATININE 1.63* 1.80*   CALCIUM 9.8 9.7       Coags    No recent results     Additional Electrolytes  Recent Labs     11/15/23  0641 11/16/23  0550   MG 1.8* 1.9          Blood Gas    No recent results  No recent results LFTs  No recent results    Infectious  No recent results  Glucose  Recent Labs     11/15/23  0641 11/16/23  0550   GLUC 118 106                 Jose Alfredo Sosa MD

## 2023-11-16 NOTE — CASE MANAGEMENT
Case Management Progress Note    Patient name Daya Liner  Location ICU 03/ICU 03 MRN [id number]  : 1955 Date 2023       LOS (days): 59  Geometric Mean LOS (GMLOS) (days): 26.10  Days to GMLOS:-37.8        OBJECTIVE:        Current admission status: Inpatient  Preferred Pharmacy:   [facility]/pharmacy #[id number]- [facility]Y [facility]ARAY - [address]. [address] [place] [address]  Phone: [phone] Fax: [address], [place] - [facility] [address] [address]  Phone: [phone] Fax: [phone]    Primary Care Provider: Ashley Morris MD    Primary Insurance: [place]  Secondary Insurance: [facility]    PROGRESS NOTE:    CM spoke with En RAJPUT with [address] [place] [address]. He'll plan to come to the hospital tomorrow morning to speak with medical team and evaluate for a set up at pt's home.

## 2023-11-16 NOTE — ASSESSMENT & PLAN NOTE
CT soft tissues of the neck: Nonocclusive thrombus noted within the right internal jugular vein just above the Mediport entrance site into the internal jugular vein. No signs of pain, headaches, vision changes. Plan:  Eliquis 10 mg for 7 days. Switch to Eliquis 5 mg starting 11/22.

## 2023-11-16 NOTE — ASSESSMENT & PLAN NOTE
Lab Results   Component Value Date    CREATININE 1.80 (H) 11/16/2023    CREATININE 1.63 (H) 11/15/2023    CREATININE 1.01 11/14/2023     Likely prerenal.  Second to poor oral intake versus poor urine output. Baseline creatinine 1 to 1.2.     Cr at baseline  Plan:  Urinary retention protocol, Daily weights, I/O  Trend Cr daily

## 2023-11-16 NOTE — PLAN OF CARE
Problem: PHYSICAL THERAPY ADULT  Goal: Performs mobility at highest level of function for planned discharge setting. See evaluation for individualized goals. Description: Treatment/Interventions: Functional transfer training, LE strengthening/ROM, Elevations, Therapeutic exercise, Endurance training, Patient/family training, Equipment eval/education, Bed mobility, Gait training, Spoke to nursing, Spoke to case management          See flowsheet documentation for full assessment, interventions and recommendations. Note: Prognosis: Fair  Problem List: Decreased strength, Decreased endurance, Impaired balance, Decreased mobility, Obesity, Decreased skin integrity, Pain  Assessment: Pt seen for re-evaluation. Pt demosntrates slight progression overall toward pt and PT goals. Please see inital evaluation for PLOF and home set up. This session,  Pt particiaptes in ambulation and stair negotiation training this session. Pt ambualtes 15'x2 with significant decrease in SP O2 to 79%, requires 2-3 minutes and cues for PLB to improve SP o2 to 90%. Overall pt is limited by respiratory status, LE strength deficits and endurance. At this time, pt is only able to tolerate small bouts of exercise/activity. Pt demonstrates continued decreased LE strength 2/2 to buckling during stair negotiation training. Pt conitnues to be at increased risk of falls secondary to medical status and age. Pt POC date extended this session, disposition reccomendation and goals remain appropriate at this time. Barriers to Discharge: Inaccessible home environment, Decreased caregiver support (3 PAUL)     Rehab Resource Intensity Level, PT: II (Moderate Resource Intensity)    See flowsheet documentation for full assessment.

## 2023-11-16 NOTE — PLAN OF CARE
Problem: OCCUPATIONAL THERAPY ADULT  Goal: Performs self-care activities at highest level of function for planned discharge setting. See evaluation for individualized goals. Description: Treatment Interventions: ADL retraining, Functional transfer training, Endurance training, Patient/family training, Equipment evaluation/education, Compensatory technique education, Activityengagement, Energy conservation          See flowsheet documentation for full assessment, interventions and recommendations. Outcome: Progressing  Note: Limitation: Decreased ADL status, Decreased cognition, Decreased endurance, Decreased high-level ADLs, Decreased self-care trans  Prognosis: Good  Assessment: Pt is a 77 y.o. female [facility] 2023 due to SOB from SLB, intubated. Pt now on [address]. Pt seen on this date for OT Re-Evaluation due to  goals. Please refer to H&P/ initial OT eval () for detailed PMH and social history. At baseline pt required A for ADLs/IADLs from daughter and use of quad cane for mobility. Upon re-eval pt performed as is listed above, demonstrating continued limitationfs from decreased endurance and fatigue impacting pt's independence w/ ADL performance. Pt able to tolerate more standing during ADL completion this session along w/ increased transfers and mobility distance however required min A for completion of LB dressing and grooming in stance this session. Pt would benefit from continued skilled OT treatment in order to maximize safety, independence and overall performance with ADLs, functional transfers, functional mobility and cognition in order to achieve highest level of function.  Updated goals are listed below     Rehab Resource Intensity Level, OT: II (Moderate Resource Intensity)     William Palacios, MARGO, OTR/L  [place] License [id number]  [address] [place], [place] [id number]

## 2023-11-16 NOTE — PHYSICAL THERAPY NOTE
Physical Therapy Re-Evaluation    Patient's Name: Leeanna Powell    Admitting Diagnosis  Angioedema, initial encounter [facility]. 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    Myofascial pain syndrome    Pain syndrome, chronic    Angio-edema, initial encounter    Bipolar disorder, unspecified ([address])    Nicotine dependence    Bone lesion    CKD (chronic kidney disease) stage 3, GFR 30-59 ml/min (HCC)    Acute respiratory failure with hypoxia secondary to oropharyngeal mass    (HFpEF) heart failure with preserved ejection fraction (HCC)    Pulmonary embolism (HCC)    Ambulatory dysfunction    Oropharyngeal mass    Blister (nonthermal) of left forearm, sequela    Large cell lymphoma (HCC)    Chemotherapy induced neutropenia     Chronic Superficial thrombophlebitis    Generalized abdominal pain    Depression    Pancytopenia due to chemotherapy ([address])    Protein-calorie malnutrition ([address])    Acute embolism and thrombosis of right internal jugular vein (HCC)       Past Medical History  Past Medical History:   Diagnosis Date    Anxiety     Depression     Fatty liver     Hyperlipidemia     Hypertension     Psychiatric disorder     Varicella        Past Surgical History  Past Surgical History:   Procedure Laterality Date    BREAST SURGERY Bilateral     Reduction Procedure    CARDIAC SURGERY       SECTION      x4    DILATION AND CURETTAGE OF UTERUS      ECTOPIC PREGNANCY SURGERY      IR GASTROSTOMY TUBE PLACEMENT  2023    IR TUNNELED CENTRAL LINE PLACEMENT  10/19/2023     [address] INCL FLUOR GDNCE DX W/CELL WASHG SPX N/A 2023    Procedure: BRONCHOSCOPY FLEXIBLE;  Surgeon: Marisol Miller MD;  [facility];  Service: ENT    TRACHEOSTOMY N/A 2023    Procedure: TRACHEOSTOMY, biopsy of nasopharynx mass;  Surgeon: Marisol Miller MD;  [facility];  Service: ENT 23 [id number]   Note Type   Note Type Treatment   Pain Assessment   Pain Assessment Tool 0-10   Pain Score 5   Pain Location/Orientation Location: Generalized   Restrictions/Precautions   Weight Bearing Precautions Per Order No   Other Precautions Chair Alarm; Bed Alarm;Telemetry;O2;Fall Risk;Multiple lines;Pain  (15L [address] [facility])   General   Chart Reviewed Yes   Additional Pertinent History Pt seen for PT reevaluation on this date, due to expiring POC date. Family/Caregiver Present No   Cognition   Orientation Level Oriented X4   Following Commands Follows one step commands without difficulty   Comments pt ID by wristband, name and    Subjective   Subjective pt up in recliner chair agreeable to PT treatment   Transfers   Sit to Stand 4  Minimal assistance   Additional items Assist x 1; Increased time required;Verbal cues   Stand to Sit 4  Minimal assistance   Additional items Assist x 1; Increased time required;Verbal cues   Additional Comments x8 STS performed with PT, pt requires cosnistent min x1 assist this session, vc for steadying upon standing   Ambulation/Elevation   Gait pattern Decreased foot clearance; Short stride; Excessively slow   Gait Assistance 4  Minimal assist   Additional items Assist x 1;Verbal cues   Assistive Device Rolling walker   Distance 16'x2   Stair Management Assistance 3  Moderate assist   Additional items Assist x 1   Stair Management Technique One rail R  (HHA L)   Number of Stairs 1  (1,1,1)   Ambulation/Elevation Additional Comments post ambulation, pt SP O2 to 79%, requires seated rest, 2-3 minutes and PLB for improvement in SP O2, improves to at least 90%, 94% prior to trial of stair training.  pt with BL LE buckling during initial stair trial.   Balance   Static Sitting Fair +   Dynamic Sitting Fair   Static Standing Fair -   Dynamic Standing Poor +   Ambulatory Poor  (RW)   Activity Tolerance   Activity Tolerance Patient limited by fatigue;Patient limited by pain;Treatment limited secondary to medical complications (Comment)  (decreased SP O2)   Medical Staff Made Aware care coordinated with OT sathish 2/2 decreased activity tolerance, spoke with CM   Nurse Made Aware TINO james pre/post   Assessment   Prognosis Fair   Problem List Decreased strength;Decreased endurance; Impaired balance;Decreased mobility;Obesity; Decreased skin integrity;Pain   Assessment Pt seen for re-evaluation. Pt demosntrates slight progression overall toward pt and PT goals. Please see inital evaluation for PLOF and home set up. This session,  Pt particiaptes in ambulation and stair negotiation training this session. Pt ambualtes 15'x2 with significant decrease in SP O2 to 79%, requires 2-3 minutes and cues for PLB to improve SP o2 to 90%. Overall pt is limited by respiratory status, LE strength deficits and endurance. At this time, pt is only able to tolerate small bouts of exercise/activity. Pt demonstrates continued decreased LE strength 2/2 to buckling during stair negotiation training. Pt conitnues to be at increased risk of falls secondary to medical status and age. Pt POC date extended this session, disposition reccomendation and goals remain appropriate at this time. Barriers to Discharge Inaccessible home environment;Decreased caregiver support  (3 PAUL)   Goals   Patient Goals to get stronger   STG Expiration Date 11/26/23   Short Term Goal #1 In 10-14 days pt will be able to: 1. Demonstrate ability to perform all aspects of bed mobility independently to improve functional safety. 2. Perform functional transfers independently to facilitate safe return to previous living environment. PROGRESSING 3. Ambulate 50 ft with LRAD and supervision with stable vitals to improve safety with household distances and reduce fall risk. PROGRESSING  4. Improve LE strength grades by 1 to increase ease of functional mobility with transfers and gait.  5. Pt will demonstrate improved balance by one grade in order to decrease risk of falls. PROGRESSING 6. Tolerate 3 hours OOB to promote improved activity tolerance. MET 7. Improve 30s STS to at least 10 repetitions to decrease risk of falls. PROGRESSING   PT Treatment Day 8   Plan   Treatment/Interventions Functional transfer training;LE strengthening/ROM; Elevations; Therapeutic exercise;Cognitive reorientation;Patient/family training;Equipment eval/education; Bed mobility;Gait training;Spoke to nursing;Spoke to case management;OT   Progress Slow progress, decreased activity tolerance   PT Frequency 2-3x/wk   Discharge Recommendation   Rehab Resource Intensity Level, PT II (Moderate Resource Intensity)   Equipment Recommended Wheelchair  (ROLLATOR, HOSPITAL BED)   Wheelchair Package Recommended Standard   Change or Add item to Wheelchair Package? No   AM-PAC Basic Mobility Inpatient   Turning in Flat Bed Without Bedrails 3   Lying on Back to Sitting on Edge of Flat Bed Without Bedrails 3   Moving Bed to Chair 3   Standing Up From Chair Using Arms 3   Walk in Room 3   Climb 3-5 Stairs With Railing 1   Basic Mobility Inpatient Raw Score 16   Basic Mobility Standardized Score 38.32   Highest Level Of Mobility   JH-HLM Goal 5: Stand one or more mins   JH-HLM Achieved 6: Walk 10 steps or more   Education   Education Provided Mobility training   Patient Explanation/teachback used   End of Consult   Patient Position at End of Consult Supine; All needs within reach   The patient's AM-PAC Basic Mobility Inpatient Short Form Raw Score is 14. A Raw score of less than or equal to 16 suggests the patient may benefit from discharge to post-acute rehabilitation services. Please also refer to the recommendation of the Physical Therapist for safe discharge planning.     Mary Gayle, PT

## 2023-11-16 NOTE — PLAN OF CARE
Problem: MOBILITY - ADULT  Goal: Maintain or return to baseline ADL function  Description: INTERVENTIONS:  -  Assess patient's ability to carry out ADLs; assess patient's baseline for ADL function and identify physical deficits which impact ability to perform ADLs (bathing, care of mouth/teeth, toileting, grooming, dressing, etc.)  - Assess/evaluate cause of self-care deficits   - Assess range of motion  - Assess patient's mobility; develop plan if impaired  - Assess patient's need for assistive devices and provide as appropriate  - Encourage maximum independence but intervene and supervise when necessary  - Involve family in performance of ADLs  - Assess for home care needs following discharge   - Consider OT consult to assist with ADL evaluation and planning for discharge  - Provide patient education as appropriate  Outcome: Progressing  Goal: Maintains/Returns to pre admission functional level  Description: INTERVENTIONS:  - Perform BMAT or MOVE assessment daily.   - Set and communicate daily mobility goal to care team and patient/family/caregiver. - Collaborate with rehabilitation services on mobility goals if consulted  - Perform Range of Motion  times a day. - Reposition patient every  hours.   - Dangle patient times a day  - Stand patient  times a day  - Ambulate patient  times a day  - Out of bed to chair  times a day   - Out of bed for meals   Problem: Prexisting or High Potential for Compromised Skin Integrity  Goal: Skin integrity is maintained or improved  Description: INTERVENTIONS:  - Identify patients at risk for skin breakdown  - Assess and monitor skin integrity  - Assess and monitor nutrition and hydration status  - Monitor labs   - Assess for incontinence   - Turn and reposition patient  - Assist with mobility/ambulation  - Relieve pressure over bony prominences  - Avoid friction and shearing  - Provide appropriate hygiene as needed including keeping skin clean and dry  - Evaluate need for skin moisturizer/barrier cream  - Collaborate with interdisciplinary team   - Patient/family teaching  - Consider wound care consult   Outcome: Progressing     Problem: Nutrition/Hydration-ADULT  Goal: Nutrient/Hydration intake appropriate for improving, restoring or maintaining nutritional needs  Description: Monitor and assess patient's nutrition/hydration status for malnutrition. Collaborate with interdisciplinary team and initiate plan and interventions as ordered. Monitor patient's weight and dietary intake as ordered or per policy. Utilize nutrition screening tool and intervene as necessary. Determine patient's food preferences and provide high-protein, high-caloric foods as appropriate.      INTERVENTIONS:  - Monitor oral intake, urinary output, labs, and treatment plans  - Assess nutrition and hydration status and recommend course of action  - Evaluate amount of meals eaten  - Assist patient with eating if necessary   - Allow adequate time for meals  - Recommend/ encourage appropriate diets, oral nutritional supplements, and vitamin/mineral supplements  - Order, calculate, and assess calorie counts as needed  - Recommend, monitor, and adjust tube feedings and TPN/PPN based on assessed needs  - Assess need for intravenous fluids  - Provide specific nutrition/hydration education as appropriate  - Include patient/family/caregiver in decisions related to nutrition  Outcome: Progressing     Problem: PAIN - ADULT  Goal: Verbalizes/displays adequate comfort level or baseline comfort level  Description: Interventions:  - Encourage patient to monitor pain and request assistance  - Assess pain using appropriate pain scale  - Administer analgesics based on type and severity of pain and evaluate response  - Implement non-pharmacological measures as appropriate and evaluate response  - Consider cultural and social influences on pain and pain management  - Notify physician/advanced practitioner if interventions unsuccessful or patient reports new pain  Outcome: Progressing     Problem: INFECTION - ADULT  Goal: Absence or prevention of progression during hospitalization  Description: INTERVENTIONS:  - Assess and monitor for signs and symptoms of infection  - Monitor lab/diagnostic results  - Monitor all insertion sites, i.e. indwelling lines, tubes, and drains  - Monitor endotracheal if appropriate and nasal secretions for changes in amount and color  - [place] appropriate cooling/warming therapies per order  - Administer medications as ordered  - Instruct and encourage patient and family to use good hand hygiene technique  - Identify and instruct in appropriate isolation precautions for identified infection/condition  Outcome: Progressing  Goal: Absence of fever/infection during neutropenic period  Description: INTERVENTIONS:  - Monitor WBC    Outcome: Progressing     Problem: SAFETY ADULT  Goal: Maintain or return to baseline ADL function  Description: INTERVENTIONS:  -  Assess patient's ability to carry out ADLs; assess patient's baseline for ADL function and identify physical deficits which impact ability to perform ADLs (bathing, care of mouth/teeth, toileting, grooming, dressing, etc.)  - Assess/evaluate cause of self-care deficits   - Assess range of motion  - Assess patient's mobility; develop plan if impaired  - Assess patient's need for assistive devices and provide as appropriate  - Encourage maximum independence but intervene and supervise when necessary  - Involve family in performance of ADLs  - Assess for home care needs following discharge   - Consider OT consult to assist with ADL evaluation and planning for discharge  - Provide patient education as appropriate  Outcome: Progressing  Goal: Maintains/Returns to pre admission functional level  Description: INTERVENTIONS:  - Perform BMAT or MOVE assessment daily.   - Set and communicate daily mobility goal to care team and patient/family/caregiver.    - Collaborate with rehabilitation services on mobility goals if consulted  - Perform Range of Motion  times a day. - Reposition patient every  hours.   - Dangle patient  times a day  - Stand patient  times a day  - Ambulate patient  times a day  - Out of bed to chair  times a day   - Out of bed for meals  times a day  - Out of bed for toileting  - Record patient progress and toleration of activity level   Outcome: Progressing  Goal: Patient will remain free of falls  Description: INTERVENTIONS:  - Educate patient/family on patient safety including physical limitations  - Instruct patient to call for assistance with activity   - Consult OT/PT to assist with strengthening/mobility   - Keep Call bell within reach  - Keep bed low and locked with side rails adjusted as appropriate  - Keep care items and personal belongings within reach  - Initiate and maintain comfort rounds  - Make Fall Risk Sign visible to staff  - Offer Toileting every  Hours, in advance of need  - Initiate/Maintain alarm  - Obtain necessary fall risk management equipment:   Problem: RESPIRATORY - ADULT  Goal: Achieves optimal ventilation and oxygenation  Description: INTERVENTIONS:  - Assess for changes in respiratory status  - Assess for changes in mentation and behavior  - Position to facilitate oxygenation and minimize respiratory effort  - Oxygen administered by appropriate delivery if ordered  - Initiate smoking cessation education as indicated  - Encourage broncho-pulmonary hygiene including cough, deep breathe, Incentive Spirometry  - Assess the need for suctioning and aspirate as needed  - Assess and instruct to report SOB or any respiratory difficulty  - Respiratory Therapy support as indicated  Outcome: Progressing     Problem: GENITOURINARY - ADULT  Goal: Maintains or returns to baseline urinary function  Description: INTERVENTIONS:  - Assess urinary function  - Encourage oral fluids to ensure adequate hydration if ordered  - Administer IV fluids as ordered to ensure adequate hydration  - Administer ordered medications as needed  - Offer frequent toileting  - Follow urinary retention protocol if ordered  Outcome: Progressing  Goal: Absence of urinary retention  Description: INTERVENTIONS:  - Assess patient’s ability to void and empty bladder  - Monitor I/O  - Bladder scan as needed  - Discuss with physician/AP medications to alleviate retention as needed  - Discuss catheterization for long term situations as appropriate  Outcome: Progressing     Problem: METABOLIC, FLUID AND ELECTROLYTES - ADULT  Goal: Electrolytes maintained within normal limits  Description: INTERVENTIONS:  - Monitor labs and assess patient for signs and symptoms of electrolyte imbalances  - Administer electrolyte replacement as ordered  - Monitor response to electrolyte replacements, including repeat lab results as appropriate  - Instruct patient on fluid and nutrition as appropriate  Outcome: Progressing  Goal: Fluid balance maintained  Description: INTERVENTIONS:  - Monitor labs   - Monitor I/O and WT  - Instruct patient on fluid and nutrition as appropriate  - Assess for signs & symptoms of volume excess or deficit  Outcome: Progressing  Goal: Glucose maintained within target range  Description: INTERVENTIONS:  - Monitor Blood Glucose as ordered  - Assess for signs and symptoms of hyperglycemia and hypoglycemia  - Administer ordered medications to maintain glucose within target range  - Assess nutritional intake and initiate nutrition service referral as needed  Outcome: Progressing     - Apply yellow socks and bracelet for high fall risk patients  - Consider moving patient to room near nurses station  Outcome: Progressing    times a day  - Out of bed for toileting  - Record patient progress and toleration of activity level   Outcome: Progressing

## 2023-11-16 NOTE — ASSESSMENT & PLAN NOTE
Cuffless trach placed 9/17, transitioned to cuffed on 10/3. Oxygen requirements not improving. For mental health patient is on buspirone, quetiapine, and sertraline. For pain, gabapentin, oxycodone scheduled and prn, prn oxycodone mainly utilized for increased pain during chemo and after a bronch. On Guaifenesin, Atrovent and Xopenex for airway maintenance. No improvement in bilateral consolidation or atelectasis and groundglass opacities on repeat CT and chest x-rays. Bronchial cx with 3 colonies CoNS. CTCAP 10/12:  indicates additional groundglass opacity with paraseptal emphysema, which may be contributing to inability to remain off vent. Bronchoscopy performed and unremarkable. Samples sent to lab for culture. Patient was placed back on vent s/p procedure. Now on high flow. Completed 7 day course of cefepime and Vancomycin. Bronchial culture and gram stain negative. Micro negative for CMV, AFB, Fungitell, Pneumocystis jiroveci (carinii), Histoplasma, Aspergillus. Trach secretions sent for sputum culture shows 2+ polys, 1+ gram-positive cocci in pairs, chains and clusters and 1+ gram-negative rods. 11/14 Discussion regarding possibilities of discharge done with family. Family shows understanding of patient's condition. 11/15 Trach is capped and patient is placed on 3 L of NC. Cuffless trach was placed 9/17. Replaced with a cuffed Shiley XLT #7 10/3    Plan:  Likely Chemo induced pneumonitis. Continue inhalation treatment with Pulmicort and formoterol q12 , duonebs QID. Continue trach collar during the day and at night. Continue her on dysphagia 2 diet. Daily PT/OT. Family does not want hospice. They want chemotherapy to continue. Patient would like to return home. CM will look into disposition to home.

## 2023-11-16 NOTE — CASE MANAGEMENT
Case Management Progress Note    Patient name Benjy Maloney  Location ICU 03/ICU 03 MRN [id number]  : 1955 Date 2023       LOS (days): 59  Geometric Mean LOS (GMLOS) (days): 26.10  Days to GMLOS:-37.9        OBJECTIVE:    Current admission status: Inpatient  Preferred Pharmacy:   [id number] [place], [place] - [address]. [address] [facility] [address]  Phone: [phone] Fax: [phone] [facility] ([place] [facility] [address]  [address] [address]  Phone: [phone] Fax: [phone]    Primary Care Provider: Kaleigh Dewey MD    Primary Insurance: [employer]  Secondary Insurance: [address] [facility]    PROGRESS NOTE:    CM met with pt and her daughter, Karel Paula, at bedside. Linnette did confirm pt will have 3 PAUL the home. Discussed DME needed to return home: Hospital bed (XL sheets needed), transport chair, and shower chair. Pt confirmed she has a BSC. CM did review order will be placed. Aware CM will also have to order O2 for home once José Miguel with [employer] evaluates with medical team need for home supplies. Will also order trach supplies. Linnette confirmed plan is to hopefully have pt home after the holiday. Karel Paula confirmed / support from family until additional HHA services are approved. Aware CM will f/u with coordinator, Timur Stephens, tomorrow. CM reviewed that [facility] would do a same day visit when pt is ready for dc. They will review trach care and teaching. They will also provide PT/OT and SP. CM placed order for DME required at home via parachute.

## 2023-11-16 NOTE — OCCUPATIONAL THERAPY NOTE
Occupational Therapy Re-evaluation     Patient Name: Marva Garcia  KQAIF'N Date: 11/16/2023  Problem List  Principal Problem:    Acute respiratory failure with hypoxia secondary to oropharyngeal mass  Active Problems:    Benign essential hypertension    Pain syndrome, chronic    CKD (chronic kidney disease) stage 3, GFR 30-59 ml/min (HCC)    (HFpEF) heart failure with preserved ejection fraction (HCC)    Ambulatory dysfunction    Oropharyngeal mass    Blister (nonthermal) of left forearm, sequela    Large cell lymphoma (HCC)    Chronic Superficial thrombophlebitis    Generalized abdominal pain    Depression    Pancytopenia due to chemotherapy ([address])    Protein-calorie malnutrition ([address])    Acute embolism and thrombosis of right internal jugular vein ([address])          11/16/23 1351   OT Last Visit   OT Visit Date 11/16/23   Note Type   Note type Re-Evaluation  (and Treatment)   Pain Assessment   Pain Assessment Tool 0-10   Pain Score 5   Pain Location/Orientation Location: Generalized   Pain Onset/Description Onset: Ongoing; Descriptor: Aching   Effect of Pain on Daily Activities limits comfort, activity tolerance, speed of ADLs/mobility   Patient's Stated Pain Goal No pain   Hospital Pain Intervention(s) Repositioned; Ambulation/increased activity; Emotional support   Restrictions/Precautions   Weight Bearing Precautions Per Order No   Other Precautions Chair Alarm; Bed Alarm;Multiple lines;Telemetry;O2;Fall Risk;Pain  (15L [id number] [facility])   Home Living   Additional Comments Please refer to initial OT eval perfomed on 9/21 for home setup   Prior Function   Comments Please refer to initial OT eval performed on 9/21 for PLOF   Lifestyle   Autonomy PTA pt living with daughter in [facility] with [address] [facility], pt requiring (A) with ADLs and IADLs, (+)falls, (-)drives use of quad cane vs RW at baseline   Reciprocal Relationships supportive daughter however daughter does work during the day   Service to Others retired   Intrinsic Gratification pt enjoyed talking about family and this writer's children. Pt also noted to be rachel driven. General   Additional Pertinent History Pt on 15L [address] this session. Family/Caregiver Present No   Subjective   Subjective "I can definitely do it sitting down, I don't know about standing"   ADL   Where Assessed Chair   Eating Assistance 6  Modified independent   Eating Deficit Setup; Beverage management  (sitting in recliner upon arrival)   Grooming Assistance 5  Supervision/Setup   UB Bathing Assistance 5  Supervision/Setup   LB [address] 5  Supervision/Setup   LB [address] [facility] Deficit Setup;Steadying;Verbal cueing;Supervision/safety; Increased time to complete; Thread RLE into pants; Thread LLE into pants;Pull up over hips; Fasteners  (sitting in recliner, A to pull over hips in stance w/ min A to steady)   Toileting Assistance  4  Minimal Assistance   Bed Mobility   Additional Comments Pt received in recliner upon arrival, returned at end of session   Transfers   Sit to Stand 4  Minimal assistance   Additional items Assist x 1; Increased time required;Verbal cues;Armrests   Stand to Sit 4  Minimal assistance   Additional items Assist x 1; Armrests; Increased time required;Verbal cues   Additional Comments x 10 total STS transfers performed this session w/ min A x 1. Increased time 2/2 deconditioning. Functional Mobility   Functional Mobility 4  Minimal assistance   Additional Comments Short household distance around bed and back w/ RW and min A x 1. Limited by fatigue, pt stating "I was going to go down" prior to sitting in recliner.    Additional items Rolling walker   Balance   Static Sitting Fair +   Dynamic Sitting Fair   Static Standing Fair -   Dynamic Standing Poor +   Activity Tolerance   Activity Tolerance Patient limited by fatigue;Patient limited by pain  (SOB, SpO2)   Medical Staff Made Aware Care coordinated w/ PT Woody Sears   Nurse Made Aware yes, RN Javier Salazar ok to see pt and updated   RUE Assessment   RUE Assessment WFL   LUE Assessment   LUE Assessment WFL   Hand Function   Gross Motor Coordination Functional   Fine Motor Coordination Functional   Sensation   Light Touch No apparent deficits   Vision-Basic Assessment   Current Vision Wears glasses only for reading   Cognition   Overall Cognitive Status [facility]   Arousal/Participation Alert; Cooperative   Attention Within functional limits   Orientation Level Oriented X4   Memory Within functional limits   Following Commands Follows one step commands without difficulty   Comments Pt continues to be extremely pleasant and agreeable. Flat affect. Good safety awareness, initiation of task   Assessment   Limitation Decreased ADL status; Decreased cognition;Decreased endurance;Decreased high-level ADLs; Decreased self-care trans   Prognosis Good   Assessment Pt is a 77 y.o. female [facility] on [facility] due to SOB from SLB, intubated. Pt now on [address]. Pt seen on this date for OT Re-Evaluation due to  goals. Please refer to H&P/ initial OT eval () for detailed PMH and social history. At baseline pt required A for ADLs/IADLs from daughter and use of quad cane for mobility. Upon re-eval pt performed as is listed above, demonstrating continued limitationfs from decreased endurance and fatigue impacting pt's independence w/ ADL performance. Pt able to tolerate more standing during ADL completion this session along w/ increased transfers and mobility distance however required min A for completion of LB dressing and grooming in stance this session. Pt would benefit from continued skilled OT treatment in order to maximize safety, independence and overall performance with ADLs, functional transfers, functional mobility and cognition in order to achieve highest level of function.  Updated goals are listed below   Goals   Patient Goals to feel better   LTG Time Frame 10-14   Long Term Goal #1 see updated goals below   Plan   Treatment Interventions ADL retraining;Functional transfer training; Endurance training;Patient/family training;Equipment evaluation/education; Compensatory technique education;Continued evaluation; Energy conservation; Activityengagement   Goal Expiration Date 11/30/23   OT Treatment Day 6   OT Frequency 1-2x/wk   Discharge Recommendation   Rehab Resource Intensity Level, OT II (Moderate Resource Intensity)   Additional Comments  Pt seen as a co-eval with PT due to the patient's co-morbidities and clinically unstable presentation indicated by chart review. During session, pt benefited from two skilled therapists due to pt's decreased activity tolerance. Additional Comments 2 impaired pain, balance, fxnl mobility, act mike, fxnl reach, standing mike, and strength, pacing, response time   AM-PAC Daily Activity Inpatient   Lower Body Dressing 3   Bathing 2   Toileting 3   Upper Body Dressing 4   Grooming 4   Eating 4   Daily Activity Raw Score 20   Daily Activity Standardized Score (Calc for Raw Score >=11) 42.03   AM-PAC Applied Cognition Inpatient   Following a Speech/Presentation 4   Understanding Ordinary Conversation 4   Taking Medications 3   Remembering Where Things Are Placed or Put Away 4   Remembering List of 4-5 Errands 3   Taking Care of Complicated Tasks 3   Applied Cognition Raw Score 21   Applied Cognition Standardized Score 44.3   Additional Treatment Session   Start Time 1342   End Time 1351   Treatment Assessment Pt seen for additional treatment session to perform standing grooming task. Pt able to perform setup of toothbrush in sitting and attempted to perform activity in stance however was limited by fatigue and BLE knee buckling and required completion of task in sitting. Pt SpO2 dropping to 82% after activity and required seated rest break and PLB to recover prior to standing to spit.  Pt required x 3 seated rest breaks during task 2/2 limited endurance. All needs met and call bell within reach. Pt would benefit from continued OT services while in this setting to maximize independence with ADLs. Additional Treatment Day 1   End of Consult   Patient Position at End of Consult Bedside chair;Bed/Chair alarm activated; All needs within reach   Nurse Communication Nurse aware of consult     GOALS:    *Goals established to promote patient goal of to feel better:      *Patient will perform grooming tasks standing at sink with (S) in order to increase overall independence    *LB ADL with mod (I) using AE prn for inc'd independence with self care    *Toileting with mod (I) for clothing management and hygiene to increase hygiene/thoroughness in order to reduce caregiver burden    *Patient will verbalize and demonstrate use of energy conservation/deep breathing techniques and work simplification skills during functional activities with no verbal cues. *ADL transfers with mod (I) for inc'd independence with ADLs/purposeful tasks    *Patient will increase functional mobility to and from bathroom with rolling walker with supervision to increase independence with toileting    *Patient will increase OOB/sitting tolerance to 2-4 hours per day to increase participation in self-care and leisure tasks with no s/s of exertion. *Increase stand tolerance x 3  m for inc'd tolerance with standing purposeful tasks    *Patient will improve functional activity tolerance to 20 minutes of sustained functional tasks to increase participation in basic self-care and decrease assistance level.       MARGO Callahan, OTR/L    [place] License [id number]  [address] [place] [address] [phone]

## 2023-11-16 NOTE — ASSESSMENT & PLAN NOTE
Recent Labs     11/14/23  0440 11/15/23  0641 11/16/23  0550   K 3.4* 4.1 4.3   MG 2.1 1.8* 1.9     Workup:  Etiology: Patient takes bactrim. Tumor lysis would be less likely since calcium is elevated rather than decreased. Uric acid is normal.   K+ elevated but returned to normal after medical management. Will give another round of medical management if elevated again. Plan:  Kdur 40 meq PO. After 2 hours, potassium chloride 20 meq IV. Trend potassium on daily BMP.

## 2023-11-16 NOTE — ASSESSMENT & PLAN NOTE
Large B-cell lymphoma, stage II. First chemo cycle 9/22 4 days of R-EPOCH. 9/27 Rituxan. 9/28 Filgrastim. Received nivestym 300 mc 7 days dcd on 10/26      Prophylaxis:  Bactrim prophylaxis Monday Wednesday Friday,  Acyclovir 400 mg twice daily  Fluconazole 200 mg daily  Levofloxacin 250 mg daily  Lovenox 40 mg daily    9/22  4 days of R-EPOCH.  9/27 Rituxan  10/13 for [facility] cycle 2, which was done over 4 days and cytoxan on 10/19.  11/6 completed R-EPOCH cycle 3 and Cytoxan 11/7. Plan:  CT neck w wo contrast   Oncology is following. Next chemotherapy cycle will be planned after the size of the mass is assessed. Plan for repeat PET-CT upon hospital discharge to evaluate disease response. Transfuse blood products as needed.   Keep Hgb>7 and Plt>20 for chemo   See acute respiratory failure above

## 2023-11-16 NOTE — ASSESSMENT & PLAN NOTE
Lab Results   Component Value Date    EGFR 28 11/16/2023    EGFR 31 11/15/2023    EGFR 56 11/14/2023    CREATININE 1.80 (H) 11/16/2023    CREATININE 1.63 (H) 11/15/2023    CREATININE 1.01 11/14/2023     Baseline Cr between 0.75-1.1  Initial HERIBERTO felt 2/2 prerenal azotemia 2/2 diuresis, reduced PO intake +/- ATN. Patient received 2 doses of Bumex IV 2 g as she had not been responding appropriately to IV 40 Lasix daily. Creatinine went up by 0.5 meeting criteria for an HERIBERTO. Suspect most likely due to medications as a combination of prerenal and intrinsic. FENa was 0.2%, UA shows no casts or signs of infection, urine eosinophils 0%. Patient likely has prerenal HERIBERTO from volume depletion. Received 2 L NS and 1 pRBC and cr went up by 0.07 still and Na and Cl went down, suspect that volume prevented further cr rise and free water excretion impaired by kidney function, allowing hyponatremia to increase. Suspect that now that nephrotoxic medications have been stopped she responded well to Lasix and creatinine downtrended. HERIBERTO now resolved. Will be cautious about nephrotoxins and monitor as restarting EPOCH and ppx. Patient gets volume depleted and overloaded very easily. Especially from chemo. 10/19 Heriberto as cr went up by 0.3 in 48 hours from 0.82 on 10/17 to 1.22 on 10/19. Suspect this is prerenal hypovolemic, will see if patient improves with fluids. She gets overloaded easily so if no improvement suspect CRS again. 10/29 HERIBERTO cr went up by 0.3 again. She has 922 ml UOP in last 24 hours. HERIBERTO likely from lasix 40mg IV given yesterday. She is not hypovolemic. Plan:   Continue to monitor UO/renal function. Avoid nephrotoxic agents  Continue to monitor a.m. BMP.

## 2023-11-16 NOTE — ASSESSMENT & PLAN NOTE
Malnutrition Findings:   Adult Malnutrition type: Chronic illness  Adult Degree of Malnutrition: Unspecified protein calorie malnutrition  Malnutrition Characteristics: Inadequate energy, Other (comment) (stress of illness)                  360 Statement: Protein calorie malnutrition r/t inadequate intake as evidenced by >7 day period of poor intakes and stress of infection; currently treated with regular diet and nutrition supplements    BMI Findings: Body mass index is 33.31 kg/m².

## 2023-11-16 NOTE — ASSESSMENT & PLAN NOTE
Wt Readings from Last 3 Encounters:   11/16/23 79 kg (174 lb 2.6 oz)   09/07/23 74.6 kg (164 lb 6.4 oz)   08/30/23 73.3 kg (161 lb 9.6 oz)     Lab Results   Component Value Date    LVEF 60 08/28/2023     (H) 10/28/2023     (H) 09/29/2023     Echo 8/28/23: LVEF 60%.        Plan:  K > 4   Measure I/O

## 2023-11-17 PROBLEM — R10.84 GENERALIZED ABDOMINAL PAIN: Status: RESOLVED | Noted: 2023-09-30 | Resolved: 2023-11-17

## 2023-11-17 LAB
ABO GROUP BLD: NORMAL
ANION GAP SERPL CALCULATED.3IONS-SCNC: 9 MMOL/L
ANISOCYTOSIS BLD QL SMEAR: PRESENT
ATRIAL RATE: 116 BPM
BASE EX.OXY STD BLDV CALC-SCNC: 54.7 % (ref 60–80)
BASE EXCESS BLDV CALC-SCNC: 3.3 MMOL/L
BASOPHILS # BLD MANUAL: 0.09 THOUSAND/UL (ref 0–0.1)
BASOPHILS NFR MAR MANUAL: 2 % (ref 0–1)
BLD GP AB SCN SERPL QL: NEGATIVE
BUN SERPL-MCNC: 24 MG/DL (ref 5–25)
CALCIUM SERPL-MCNC: 9.7 MG/DL (ref 8.4–10.2)
CHLORIDE SERPL-SCNC: 106 MMOL/L (ref 96–108)
CO2 SERPL-SCNC: 29 MMOL/L (ref 21–32)
CREAT SERPL-MCNC: 1.66 MG/DL (ref 0.6–1.3)
EOSINOPHIL # BLD MANUAL: 0.04 THOUSAND/UL (ref 0–0.4)
EOSINOPHIL NFR BLD MANUAL: 1 % (ref 0–6)
ERYTHROCYTE [DISTWIDTH] IN BLOOD BY AUTOMATED COUNT: 16 % (ref 11.6–15.1)
GFR SERPL CREATININE-BSD FRML MDRD: 31 ML/MIN/1.73SQ M
GLUCOSE SERPL-MCNC: 107 MG/DL (ref 65–140)
HCO3 BLDV-SCNC: 30.1 MMOL/L (ref 24–30)
HCT VFR BLD AUTO: 22.3 % (ref 34.8–46.1)
HGB BLD-MCNC: 6.7 G/DL (ref 11.5–15.4)
HGB BLD-MCNC: 6.9 G/DL (ref 11.5–15.4)
HGB BLD-MCNC: 8.6 G/DL (ref 11.5–15.4)
LYMPHOCYTES # BLD AUTO: 1.03 THOUSAND/UL (ref 0.6–4.47)
LYMPHOCYTES # BLD AUTO: 23 % (ref 14–44)
MCH RBC QN AUTO: 30.2 PG (ref 26.8–34.3)
MCHC RBC AUTO-ENTMCNC: 30 G/DL (ref 31.4–37.4)
MCV RBC AUTO: 101 FL (ref 82–98)
METAMYELOCYTES NFR BLD MANUAL: 2 % (ref 0–1)
MONOCYTES # BLD AUTO: 0.18 THOUSAND/UL (ref 0–1.22)
MONOCYTES NFR BLD: 4 % (ref 4–12)
MYELOCYTES NFR BLD MANUAL: 1 % (ref 0–1)
NEUTROPHILS # BLD MANUAL: 2.99 THOUSAND/UL (ref 1.85–7.62)
NEUTS BAND NFR BLD MANUAL: 26 % (ref 0–8)
NEUTS SEG NFR BLD AUTO: 41 % (ref 43–75)
NRBC BLD AUTO-RTO: 1 /100 WBC (ref 0–2)
O2 CT BLDV-SCNC: 6.3 ML/DL
P AXIS: 79 DEGREES
PCO2 BLDV: 59.6 MM HG (ref 42–50)
PH BLDV: 7.32 [PH] (ref 7.3–7.4)
PLATELET # BLD AUTO: 154 THOUSANDS/UL (ref 149–390)
PLATELET BLD QL SMEAR: ADEQUATE
PMV BLD AUTO: 9.9 FL (ref 8.9–12.7)
PO2 BLDV: 32.4 MM HG (ref 35–45)
POLYCHROMASIA BLD QL SMEAR: PRESENT
POTASSIUM SERPL-SCNC: 4 MMOL/L (ref 3.5–5.3)
PR INTERVAL: 158 MS
QRS AXIS: 75 DEGREES
QRSD INTERVAL: 86 MS
QT INTERVAL: 334 MS
QTC INTERVAL: 464 MS
RBC # BLD AUTO: 2.22 MILLION/UL (ref 3.81–5.12)
RBC MORPH BLD: PRESENT
RH BLD: POSITIVE
SODIUM SERPL-SCNC: 144 MMOL/L (ref 135–147)
SPECIMEN EXPIRATION DATE: NORMAL
T WAVE AXIS: 61 DEGREES
VENTRICULAR RATE: 116 BPM
WBC # BLD AUTO: 4.47 THOUSAND/UL (ref 4.31–10.16)

## 2023-11-17 PROCEDURE — 86901 BLOOD TYPING SEROLOGIC RH(D): CPT

## 2023-11-17 PROCEDURE — 99233 SBSQ HOSP IP/OBS HIGH 50: CPT | Performed by: INTERNAL MEDICINE

## 2023-11-17 PROCEDURE — 85007 BL SMEAR W/DIFF WBC COUNT: CPT | Performed by: INTERNAL MEDICINE

## 2023-11-17 PROCEDURE — 94640 AIRWAY INHALATION TREATMENT: CPT

## 2023-11-17 PROCEDURE — 94760 N-INVAS EAR/PLS OXIMETRY 1: CPT

## 2023-11-17 PROCEDURE — 85018 HEMOGLOBIN: CPT

## 2023-11-17 PROCEDURE — 86900 BLOOD TYPING SEROLOGIC ABO: CPT

## 2023-11-17 PROCEDURE — 86923 COMPATIBILITY TEST ELECTRIC: CPT

## 2023-11-17 PROCEDURE — 93010 ELECTROCARDIOGRAM REPORT: CPT | Performed by: INTERNAL MEDICINE

## 2023-11-17 PROCEDURE — 86850 RBC ANTIBODY SCREEN: CPT

## 2023-11-17 PROCEDURE — 82805 BLOOD GASES W/O2 SATURATION: CPT

## 2023-11-17 PROCEDURE — 80048 BASIC METABOLIC PNL TOTAL CA: CPT

## 2023-11-17 PROCEDURE — 85027 COMPLETE CBC AUTOMATED: CPT | Performed by: INTERNAL MEDICINE

## 2023-11-17 PROCEDURE — P9040 RBC LEUKOREDUCED IRRADIATED: HCPCS

## 2023-11-17 RX ADMIN — BUSPIRONE HYDROCHLORIDE 30 MG: 10 TABLET ORAL at 09:26

## 2023-11-17 RX ADMIN — FOLIC ACID 1 MG: 1 TABLET ORAL at 09:11

## 2023-11-17 RX ADMIN — SULFAMETHOXAZOLE AND TRIMETHOPRIM 1 TABLET: 800; 160 TABLET ORAL at 09:11

## 2023-11-17 RX ADMIN — GABAPENTIN 300 MG: 300 CAPSULE ORAL at 09:11

## 2023-11-17 RX ADMIN — APIXABAN 10 MG: 5 TABLET, FILM COATED ORAL at 21:20

## 2023-11-17 RX ADMIN — LEVALBUTEROL HYDROCHLORIDE 1.25 MG: 1.25 SOLUTION RESPIRATORY (INHALATION) at 20:35

## 2023-11-17 RX ADMIN — BUDESONIDE 0.5 MG: 0.5 INHALANT ORAL at 08:48

## 2023-11-17 RX ADMIN — ALTEPLASE 2 MG: 2.2 INJECTION, POWDER, LYOPHILIZED, FOR SOLUTION INTRAVENOUS at 14:35

## 2023-11-17 RX ADMIN — IPRATROPIUM BROMIDE 0.5 MG: 0.5 SOLUTION RESPIRATORY (INHALATION) at 13:11

## 2023-11-17 RX ADMIN — OXYCODONE HYDROCHLORIDE 5 MG: 5 SOLUTION ORAL at 21:19

## 2023-11-17 RX ADMIN — BUSPIRONE HYDROCHLORIDE 30 MG: 10 TABLET ORAL at 21:21

## 2023-11-17 RX ADMIN — MELATONIN 3 MG: at 21:19

## 2023-11-17 RX ADMIN — AMLODIPINE BESYLATE 10 MG: 5 TABLET ORAL at 09:11

## 2023-11-17 RX ADMIN — IPRATROPIUM BROMIDE 0.5 MG: 0.5 SOLUTION RESPIRATORY (INHALATION) at 08:48

## 2023-11-17 RX ADMIN — FORMOTEROL FUMARATE DIHYDRATE 20 MCG: 20 SOLUTION RESPIRATORY (INHALATION) at 08:48

## 2023-11-17 RX ADMIN — METOPROLOL TARTRATE 25 MG: 25 TABLET, FILM COATED ORAL at 21:16

## 2023-11-17 RX ADMIN — SODIUM CHLORIDE SOLN NEBU 3% 4 ML: 3 NEBU SOLN at 20:38

## 2023-11-17 RX ADMIN — QUETIAPINE FUMARATE 50 MG: 25 TABLET ORAL at 21:19

## 2023-11-17 RX ADMIN — GABAPENTIN 300 MG: 300 CAPSULE ORAL at 21:20

## 2023-11-17 RX ADMIN — FAMOTIDINE 20 MG: 20 TABLET, FILM COATED ORAL at 09:11

## 2023-11-17 RX ADMIN — SODIUM CHLORIDE SOLN NEBU 3% 4 ML: 3 NEBU SOLN at 08:48

## 2023-11-17 RX ADMIN — SERTRALINE 75 MG: 25 TABLET, FILM COATED ORAL at 09:11

## 2023-11-17 RX ADMIN — IPRATROPIUM BROMIDE 0.5 MG: 0.5 SOLUTION RESPIRATORY (INHALATION) at 20:35

## 2023-11-17 RX ADMIN — CHLORHEXIDINE GLUCONATE 15 ML: 1.2 SOLUTION ORAL at 21:16

## 2023-11-17 RX ADMIN — FAMOTIDINE 20 MG: 20 TABLET, FILM COATED ORAL at 17:41

## 2023-11-17 RX ADMIN — OXYCODONE HYDROCHLORIDE 5 MG: 5 SOLUTION ORAL at 12:22

## 2023-11-17 RX ADMIN — ACYCLOVIR 400 MG: 200 CAPSULE ORAL at 09:27

## 2023-11-17 RX ADMIN — BUDESONIDE 0.5 MG: 0.5 INHALANT ORAL at 20:35

## 2023-11-17 RX ADMIN — LEVALBUTEROL HYDROCHLORIDE 1.25 MG: 1.25 SOLUTION RESPIRATORY (INHALATION) at 13:10

## 2023-11-17 RX ADMIN — OXYCODONE HYDROCHLORIDE 2.5 MG: 5 SOLUTION ORAL at 01:57

## 2023-11-17 RX ADMIN — BUSPIRONE HYDROCHLORIDE 30 MG: 10 TABLET ORAL at 16:06

## 2023-11-17 RX ADMIN — LEVALBUTEROL HYDROCHLORIDE 1.25 MG: 1.25 SOLUTION RESPIRATORY (INHALATION) at 08:48

## 2023-11-17 RX ADMIN — APIXABAN 10 MG: 5 TABLET, FILM COATED ORAL at 09:11

## 2023-11-17 RX ADMIN — OXYCODONE HYDROCHLORIDE 5 MG: 5 SOLUTION ORAL at 05:59

## 2023-11-17 RX ADMIN — SODIUM CHLORIDE SOLN NEBU 3% 4 ML: 3 NEBU SOLN at 13:11

## 2023-11-17 RX ADMIN — FLUCONAZOLE 400 MG: 100 TABLET ORAL at 09:11

## 2023-11-17 RX ADMIN — NICOTINE 7 MG: 7 PATCH, EXTENDED RELEASE TRANSDERMAL at 09:12

## 2023-11-17 RX ADMIN — FORMOTEROL FUMARATE DIHYDRATE 20 MCG: 20 SOLUTION RESPIRATORY (INHALATION) at 20:35

## 2023-11-17 RX ADMIN — CHLORHEXIDINE GLUCONATE 15 ML: 1.2 SOLUTION ORAL at 09:12

## 2023-11-17 RX ADMIN — METOPROLOL TARTRATE 25 MG: 25 TABLET, FILM COATED ORAL at 09:11

## 2023-11-17 RX ADMIN — GABAPENTIN 300 MG: 300 CAPSULE ORAL at 16:05

## 2023-11-17 RX ADMIN — ACYCLOVIR 400 MG: 200 CAPSULE ORAL at 17:41

## 2023-11-17 NOTE — ASSESSMENT & PLAN NOTE
Lab Results   Component Value Date    CREATININE 1.66 (H) 11/17/2023    CREATININE 1.80 (H) 11/16/2023    CREATININE 1.63 (H) 11/15/2023     Likely prerenal.  Second to poor oral intake versus poor urine output. Baseline creatinine 1 to 1.2.     Cr at baseline  Plan:  Urinary retention protocol, Daily weights, I/O  Trend Cr daily

## 2023-11-17 NOTE — ASSESSMENT & PLAN NOTE
Patient received 1 PRBC transfusion on 10/5 and 10/25. Her B/L hemoglobin is 12-14. No sites of bleeding, no black stools. Iron panel indicative of inflammatory anemia. Normal Vitamin B12 levels. Folate is low 5.5. Filgrastim 4 doses administered by heme/onc. HB downtrended to 6.7 today. Plan:  Transfuse 1 unit PRBC irradiated, leukoreduced. Trend hemoglobin and transfuse for <7. As per heme/onc transfuse irradiated and leukoreduced blood products. Trend platelets  Folic acid 1mg daily supplementation  As per my conversation with Heme/oncology attending, patient is expected to have chemotherapy associated pancytopenia. No further anemia w/u required.   Low ANC management as per heme/onc

## 2023-11-17 NOTE — ASSESSMENT & PLAN NOTE
Lab Results   Component Value Date    EGFR 31 11/17/2023    EGFR 28 11/16/2023    EGFR 31 11/15/2023    CREATININE 1.66 (H) 11/17/2023    CREATININE 1.80 (H) 11/16/2023    CREATININE 1.63 (H) 11/15/2023     Baseline Cr between 0.75-1.1  Initial HERIBERTO felt 2/2 prerenal azotemia 2/2 diuresis, reduced PO intake +/- ATN. Patient received 2 doses of Bumex IV 2 g as she had not been responding appropriately to IV 40 Lasix daily. Creatinine went up by 0.5 meeting criteria for an HERIBERTO. Suspect most likely due to medications as a combination of prerenal and intrinsic. FENa was 0.2%, UA shows no casts or signs of infection, urine eosinophils 0%. Patient likely has prerenal HERIBERTO from volume depletion. Received 2 L NS and 1 pRBC and cr went up by 0.07 still and Na and Cl went down, suspect that volume prevented further cr rise and free water excretion impaired by kidney function, allowing hyponatremia to increase. Suspect that now that nephrotoxic medications have been stopped she responded well to Lasix and creatinine downtrended. HERIBERTO now resolved. Will be cautious about nephrotoxins and monitor as restarting EPOCH and ppx. Patient gets volume depleted and overloaded very easily. Especially from chemo. 10/19 Heriberto as cr went up by 0.3 in 48 hours from 0.82 on 10/17 to 1.22 on 10/19. Suspect this is prerenal hypovolemic, will see if patient improves with fluids. She gets overloaded easily so if no improvement suspect CRS again. 10/29 HERIBERTO cr went up by 0.3 again. She has 922 ml UOP in last 24 hours. HERIBERTO likely from lasix 40mg IV given yesterday. She is not hypovolemic.    11/17 HERIBERTO creatinine up to 1.8. Isolyte bolus 500 given. Plan:   Creatinine downtrended to 1.66. Continue to monitor UO/renal function. Avoid nephrotoxic agents  Continue to monitor a.m. BMP.

## 2023-11-17 NOTE — ASSESSMENT & PLAN NOTE
Cuffless trach placed 9/17, transitioned to cuffed on 10/3. Oxygen requirements not improving. For mental health patient is on buspirone, quetiapine, and sertraline. For pain, gabapentin, oxycodone scheduled and prn, prn oxycodone mainly utilized for increased pain during chemo and after a bronch. On Guaifenesin, Atrovent and Xopenex for airway maintenance. No improvement in bilateral consolidation or atelectasis and groundglass opacities on repeat CT and chest x-rays. Bronchial cx with 3 colonies CoNS. CTCAP 10/12:  indicates additional groundglass opacity with paraseptal emphysema, which may be contributing to inability to remain off vent. Bronchoscopy performed and unremarkable. Samples sent to lab for culture. Patient was placed back on vent s/p procedure. Now on high flow. Completed 7 day course of cefepime and Vancomycin. Bronchial culture and gram stain negative. Micro negative for CMV, AFB, Fungitell, Pneumocystis jiroveci (carinii), Histoplasma, Aspergillus. Trach secretions sent for sputum culture shows 2+ polys, 1+ gram-positive cocci in pairs, chains and clusters and 1+ gram-negative rods. 11/14 Discussion regarding possibilities of discharge done with family. Family shows understanding of patient's condition. 11/15 Trach is capped and patient is placed on 3 L of NC. Cuffless trach was placed 9/17. Replaced with a cuffed Shiley XLT #7 10/3    Plan:  Continue oxygen via NC. Downtitrate as needed. Suctioning via trach if patient is desatting and unable to expectorate phlegm. Likely Chemo induced pneumonitis. Continue inhalation treatment with Pulmicort and formoterol q12 , duonebs QID. Regular diet  Daily PT/OT  Discussion with family and CM that patient will go home with oxygen and would need to be admitted to the hospital for subsequent chemotherapy sessions.  Heme/oncology will f/u with patient as she required PET-CT scan after discharge to further assess size of the mass for chemotherapy sessions.

## 2023-11-17 NOTE — RESPIRATORY THERAPY NOTE
11/17/23 0806   Respiratory Assessment   Resp Comments arrived to the unit and pt was satting 79-80% @15LMFNC   Oxygen Therapy/Pulse Ox   O2 Device Mid flow nasal cannula   Second O2 Device Non-rebreather mask   O2 Therapy Oxygen humidified   Nasal Cannula O2 Flow Rate (L/min) 15 L/min   Calculated FIO2 (%) - Nasal Cannula 80   SpO2 (!) 83 %   SpO2 Activity At Rest   $ Pulse Oximetry Spot Check Charge Completed     Pt on 15L [address] and 15L NRB to reach an oxygen saturation of 80%

## 2023-11-17 NOTE — ASSESSMENT & PLAN NOTE
Right forearm soreness. RUE US: No acute or chronic deep vein thrombosis. 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 OP not severe enough to require renal dosing at this time, will continue to monitor and adjust dosing as needed.       Continue DVT ppx with Lovenox 40

## 2023-11-17 NOTE — ASSESSMENT & PLAN NOTE
Continue with nebs. Duonebs every 6 hours. Formeterol and Pulmicort every 12 hours. Sodium chloride 3% nebs TID.

## 2023-11-17 NOTE — ASSESSMENT & PLAN NOTE
Recent Labs     11/15/23  0641 11/16/23  0550 11/17/23  0557   K 4.1 4.3 4.0   MG 1.8* 1.9  --      Workup:  Etiology: Patient takes bactrim. Tumor lysis would be less likely since calcium is elevated rather than decreased. Uric acid is normal.   K+ elevated but returned to normal after medical management. Will give another round of medical management if elevated again. Plan:  Kdur 40 meq PO. After 2 hours, potassium chloride 20 meq IV. Trend potassium on daily BMP.

## 2023-11-17 NOTE — ASSESSMENT & PLAN NOTE
POA in [place] ([place]) ED: Swollen tongue, soft and hard palate with severe angioedema. Decadron 10 mg, Benadryl 25 mg given. 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     Plan:  Cuffless trach 9/17, cuffed Shiley XLT #7 10/3  See acute respiratory failure and oropharyngeal mass above

## 2023-11-17 NOTE — ASSESSMENT & PLAN NOTE
Large B-cell lymphoma, stage II. First chemo cycle 9/22 4 days of R-EPOCH. 9/27 Rituxan. 9/28 Filgrastim. Received nivestym 300 mc 7 days dcd on 10/26      Prophylaxis:  Bactrim prophylaxis Monday Wednesday Friday,  Acyclovir 400 mg twice daily  Fluconazole 200 mg daily  Levofloxacin 250 mg daily  Lovenox 40 mg daily    9/22  4 days of R-EPOCH.  9/27 Rituxan  10/13 for [facility] cycle 2, which was done over 4 days and cytoxan on 10/19.  11/6 completed R-EPOCH cycle 3 and Cytoxan 11/7. Plan:  CT neck w wo contrast   Oncology is following. Next chemotherapy cycle will be planned after the size of the mass is assessed. Plan for repeat PET-CT upon hospital discharge to evaluate disease response. Transfuse blood products as needed.   Keep Hgb>7 and Plt>20 for chemo   See acute respiratory failure above

## 2023-11-17 NOTE — ASSESSMENT & PLAN NOTE
Lab Results   Component Value Date    CHOLESTEROL 118 10/09/2023    HDL 14 (L) 10/09/2023    TRIG 144 10/09/2023    [address] 104 10/09/2023     Continue outpatient diet and lifestyle modification.

## 2023-11-17 NOTE — PROGRESS NOTES
[facility]  Progress Note  Name: Sameer Brown  MRN: [id number]  Unit/Bed#: ICU 03 I Date of Admission: 9/13/2023   Date of Service: 11/17/2023 I Hospital Day: 72    Assessment/Plan   * Acute respiratory failure with hypoxia secondary to oropharyngeal mass  Assessment & Plan  Cuffless trach placed 9/17, transitioned to cuffed on 10/3. Oxygen requirements not improving. For mental health patient is on buspirone, quetiapine, and sertraline. For pain, gabapentin, oxycodone scheduled and prn, prn oxycodone mainly utilized for increased pain during chemo and after a bronch. On Guaifenesin, Atrovent and Xopenex for airway maintenance. No improvement in bilateral consolidation or atelectasis and groundglass opacities on repeat CT and chest x-rays. Bronchial cx with 3 colonies CoNS. CTCAP 10/12:  indicates additional groundglass opacity with paraseptal emphysema, which may be contributing to inability to remain off vent. Bronchoscopy performed and unremarkable. Samples sent to lab for culture. Patient was placed back on vent s/p procedure. Now on high flow. Completed 7 day course of cefepime and Vancomycin. Bronchial culture and gram stain negative. Micro negative for CMV, AFB, Fungitell, Pneumocystis jiroveci (carinii), Histoplasma, Aspergillus. Trach secretions sent for sputum culture shows 2+ polys, 1+ gram-positive cocci in pairs, chains and clusters and 1+ gram-negative rods. 11/14 Discussion regarding possibilities of discharge done with family. Family shows understanding of patient's condition. 11/15 Trach is capped and patient is placed on 3 L of NC. Cuffless trach was placed 9/17. Replaced with a cuffed Shiley XLT #7 10/3    Plan:  Continue oxygen via NC. Downtitrate as needed. Suctioning via trach if patient is desatting and unable to expectorate phlegm. Likely Chemo induced pneumonitis.    Continue inhalation treatment with Pulmicort and formoterol q12 , duonebs QID.  Regular diet  Daily PT/OT  Discussion with family and CM that patient will go home with oxygen and would need to be admitted to the hospital for subsequent chemotherapy sessions. Heme/oncology will f/u with patient as she required PET-CT scan after discharge to further assess size of the mass for chemotherapy sessions. Large cell lymphoma ([address])  Assessment & Plan  Large B-cell lymphoma, stage II. First chemo cycle 9/22 4 days of R-EPOCH. 9/27 Rituxan. 9/28 Filgrastim. Received nivestym 300 mc 7 days dcd on 10/26      Prophylaxis:  Bactrim prophylaxis Monday Wednesday Friday,  Acyclovir 400 mg twice daily  Fluconazole 200 mg daily  Levofloxacin 250 mg daily  Lovenox 40 mg daily    9/22  4 days of R-EPOCH.  9/27 Rituxan  10/13 for [facility] cycle 2, which was done over 4 days and cytoxan on 10/19.  11/6 completed R-EPOCH cycle 3 and Cytoxan 11/7. Plan:  CT neck w wo contrast   Oncology is following. Next chemotherapy cycle will be planned after the size of the mass is assessed. Plan for repeat PET-CT upon hospital discharge to evaluate disease response. Transfuse blood products as needed. Keep Hgb>7 and Plt>20 for chemo   See acute respiratory failure above    Acute embolism and thrombosis of right internal jugular vein (HCC)  Assessment & Plan  CT soft tissues of the neck: Nonocclusive thrombus noted within the right internal jugular vein just above the Mediport entrance site into the internal jugular vein. No signs of pain, headaches, vision changes. Plan:  Eliquis 10 mg for 7 days. Switch to Eliquis 5 mg starting 11/22. Oropharyngeal mass  Assessment & Plan  9/14 Neck/ soft tissue CT: Large enhancing soft tissue mass identified within the left neck involving multiple spaces. Centered within the left oropharynx with extensions as described above into multiple spaces. This results in significant airway compromise. 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  Small level 3/level 4 nodes are seen within the neck. Tracheostomy by ENT. Replaced on 9/26. H/o tobacco abuse, daily smoker. On nicotine while inpatient, confirmed with patient she needs nicotine patch. CT soft tissue neck shows reduced mass effect from 9/14 to 10/13. Can consider reducing trach size if continues to improve. Patient often refusing peg tube feeds but is feeding herself small meals orally. Plan:  ENT and heme onc following  As per speech therapist diet  Dysphagia 2. Continue parallel PEG tube feeds for nutrition. See acute respiratory failure and large B cell lymphoma above. Pancytopenia due to chemotherapy [facility])  Assessment & Plan  Patient received 1 PRBC transfusion on 10/5 and 10/25. Her B/L hemoglobin is 12-14. No sites of bleeding, no black stools. Iron panel indicative of inflammatory anemia. Normal Vitamin B12 levels. Folate is low 5.5. Filgrastim 4 doses administered by heme/onc. HB downtrended to 6.7 today. Plan:  Transfuse 1 unit PRBC irradiated, leukoreduced. Trend hemoglobin and transfuse for <7. As per heme/onc transfuse irradiated and leukoreduced blood products. Trend platelets  Folic acid 1mg daily supplementation  As per my conversation with Heme/oncology attending, patient is expected to have chemotherapy associated pancytopenia. No further anemia w/u required. Low ANC management as per heme/onc    Hyperkalemia-resolved as of 11/10/2023  Assessment & Plan  Recent Labs     11/15/23  0641 11/16/23  0550 11/17/23  0557   K 4.1 4.3 4.0   MG 1.8* 1.9  --      Workup:  Etiology: Patient takes bactrim. Tumor lysis would be less likely since calcium is elevated rather than decreased. Uric acid is normal.   K+ elevated but returned to normal after medical management. Will give another round of medical management if elevated again. Plan:  Kdur 40 meq PO. After 2 hours, potassium chloride 20 meq IV. Trend potassium on daily BMP.     PO (acute kidney injury) (HCC)-resolved as of 9/21/2023  Assessment & Plan  Lab Results   Component Value Date    CREATININE 1.66 (H) 11/17/2023    CREATININE 1.80 (H) 11/16/2023    CREATININE 1.63 (H) 11/15/2023     Likely prerenal.  Second to poor oral intake versus poor urine output. Baseline creatinine 1 to 1.2. Cr at baseline  Plan:  Urinary retention protocol, Daily weights, I/O  Trend Cr daily    RML pneumonia-resolved as of 9/22/2023  Assessment & Plan  9/13 CT PE study: Patchy consolidation right middle lobe, new from 8/25/2023, compatible with pneumonia. No leukocytosis, no fever/chills. Positive for sore throat, cough. New onset pneumonia after recent hospitalization and antibiotics treatment. 9/14: Bronchoscopy/ ABL. MRSA negative. ABL revealed 2+ Growth of Candida albicans, suspected contaminant. Blister (nonthermal) of left forearm, sequela  Assessment & Plan  Blisters of left upper extremity healing, no signs of infection, continue daily wound care. Generalized abdominal pain-resolved as of 11/17/2023  Assessment & Plan  Patient reports abdominal pain which is unchanged since 9/22 no guarding on exam. Takes Famotidine for GERD at home. Alk phos 10/2 145, 10/20 79. CTCAP reveals complex right upper pole renal lesion requiring possible outpatient MRI    Continue Famotidine  On bowel regimen with Senna and Miralax prn    (HFpEF) heart failure with preserved ejection fraction [facility])  Assessment & Plan  Wt Readings from Last 3 Encounters:   11/16/23 79 kg (174 lb 2.6 oz)   09/07/23 74.6 kg (164 lb 6.4 oz)   08/30/23 73.3 kg (161 lb 9.6 oz)     Lab Results   Component Value Date    LVEF 60 08/28/2023     (H) 10/28/2023     (H) 09/29/2023     Echo 8/28/23: LVEF 60%. Plan:  K > 4   Measure I/O      Ambulatory dysfunction  Assessment & Plan  2/2 Impacted by chronic pain syndrome + prolonged hospital stay. Continue OOB with PT.    PT rec post acute rehab however reportedly Cannot get LTACH placement while getting chemo per CM  She is aware STR SNFs continue to follow and treatment can be done from a SNF level of care. PT would need to be on trach collar for at least 72 hours with no need for the vent. Aware that pt would need to be around 28% FiO2     Depression  Assessment & Plan  Patient on Zoloft 75 daily and Seroquel hs  Patient is depressed, multiple family/palliative discussions. patient is firm that she wants to live and to fight, she is just tired. Currently goal is disease treatment oriented. Full code. No SI/HI ideations. Palliative signed off, will reconsult if patient changes her mind regarding wishes. Chronic Superficial thrombophlebitis  Assessment & Plan  Right forearm soreness. RUE US: No acute or chronic deep vein thrombosis. 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 PO not severe enough to require renal dosing at this time, will continue to monitor and adjust dosing as needed. Continue DVT ppx with Lovenox 40    CKD (chronic kidney disease) stage 3, GFR 30-59 ml/min [facility])  Assessment & Plan  Lab Results   Component Value Date    EGFR 31 11/17/2023    EGFR 28 11/16/2023    EGFR 31 11/15/2023    CREATININE 1.66 (H) 11/17/2023    CREATININE 1.80 (H) 11/16/2023    CREATININE 1.63 (H) 11/15/2023     Baseline Cr between 0.75-1.1  Initial PO felt 2/2 prerenal azotemia 2/2 diuresis, reduced PO intake +/- ATN. Patient received 2 doses of Bumex IV 2 g as she had not been responding appropriately to IV 40 Lasix daily. Creatinine went up by 0.5 meeting criteria for an PO. Suspect most likely due to medications as a combination of prerenal and intrinsic. FENa was 0.2%, UA shows no casts or signs of infection, urine eosinophils 0%. Patient likely has prerenal PO from volume depletion.  Received 2 L NS and 1 pRBC and cr went up by 0.07 still and Na and Cl went down, suspect that volume prevented further cr rise and free water excretion impaired by kidney function, allowing hyponatremia to increase. Suspect that now that nephrotoxic medications have been stopped she responded well to Lasix and creatinine downtrended. HERIBERTO now resolved. Will be cautious about nephrotoxins and monitor as restarting EPOCH and ppx. Patient gets volume depleted and overloaded very easily. Especially from chemo. 10/19 Heriberto as cr went up by 0.3 in 48 hours from 0.82 on 10/17 to 1.22 on 10/19. Suspect this is prerenal hypovolemic, will see if patient improves with fluids. She gets overloaded easily so if no improvement suspect CRS again. 10/29 HERIBERTO cr went up by 0.3 again. She has 922 ml UOP in last 24 hours. HERIBERTO likely from lasix 40mg IV given yesterday. She is not hypovolemic.    11/17 HERIBERTO creatinine up to 1.8. Isolyte bolus 500 given. Plan:   Creatinine downtrended to 1.66. Continue to monitor UO/renal function. Avoid nephrotoxic agents  Continue to monitor a.m. BMP. Pain syndrome, chronic  Assessment & Plan  Home regimen: Oxycodone 5 mg BID, Tylenol 650 mg q8 prn. Gabapentin 600 mg TID. Medical marijuana. Lumbar radiculopathy and impaired ambulatory dysfunction secondary to pain. Has bowel regimen and is having bowel movements daily. Patient required additional pain management during previous cycle and has some additional pain from tunneled line placement    Plan:  Continue gabapentin 300 mg TID, oxycodone 5 mg TID, prn Tylenol 650 mg q6. Oxycodone 5mg every 8 hours  Oxycodone 2.5 mg q6 PRN    Benign essential hypertension  Assessment & Plan  Home regimen Coreg 12.5 mg BID, Amlodipine 10 mg daily, and lisinopril 40 mg. All discontinued at this time secondary to hypotension and HERIBERTO since cycle 1. but stable currently without any antihypertensives. Systolic persistently elevated and tachycardic, potentially secondary to pain. Patient was more hypotensive during last chemo.  Coreg contraindicated during chemo, since cycles are every other week, would be better to stick with lopressor during duration of therapy as opposed to switching constantly. Plan:  Monitor vitals. Continue Norvasc 10 and lopressor 25 bid  Prn hydralazine for SBP > 160    Angioedema-resolved as of 11/4/2023  Assessment & Plan  [facility]: Swollen tongue, soft and hard palate with severe angioedema. Decadron 10 mg, Benadryl 25 mg given. 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 Plan:  Cuffless trach 9/17, cuffed Shiley XLT #7 10/3  See acute respiratory failure and oropharyngeal mass above    Hyperlipidemia-resolved as of 10/26/2023  Assessment & Plan  Lab Results   Component Value Date    CHOLESTEROL 118 10/09/2023    HDL 14 (L) 10/09/2023    TRIG 144 10/09/2023    [address] 104 10/09/2023     Continue outpatient diet and lifestyle modification. Protein-calorie malnutrition ([address])  Assessment & Plan  Malnutrition Findings:   Adult Malnutrition type: Chronic illness  Adult Degree of Malnutrition: Unspecified protein calorie malnutrition  Malnutrition Characteristics: Inadequate energy, Other (comment) (stress of illness)                  360 Statement: Protein calorie malnutrition r/t inadequate intake as evidenced by >7 day period of poor intakes and stress of infection; currently treated with regular diet and nutrition supplements    BMI Findings: Body mass index is 33.31 kg/m². COPD without exacerbation (HCC)-resolved as of 9/26/2023  Assessment & Plan  Continue with nebs. Duonebs every 6 hours. Formeterol and Pulmicort every 12 hours. Sodium chloride 3% nebs TID. Encephalopathy-resolved as of 9/21/2023  Assessment & Plan  9/19- Pt appeared to be AAO x3. Improving. ICU Core Measures     Vented Patient  VAP Bundle  VAP bundle ordered      A: Assess, Prevent, and Manage Pain Has pain been assessed? Yes  Need for changes to pain regimen? No   B: Both Spontaneous Awakening Trials (SATs) and Spontaneous Breathing Trials (SBTs) Plan to perform spontaneous awakening trial today?  N/A   Plan to perform spontaneous breathing trial today? N/A   Obvious barriers to extubation? NA   C: Choice of Sedation RASS Goal: 0 Alert and Calm  Need for changes to sedation or analgesia regimen? NA   D: Delirium CAM-ICU: Negative   E: Early Mobility  Plan for early mobility? Yes   F: Family Engagement Plan for family engagement today? Yes      Antibiotic Review: patient on prophylactic antibiotics     Review of Invasive Devices:        Central access plan: Medications requiring central line       Prophylaxis:  VTE VTE covered by:  apixaban, Oral, 10 mg at 11/17/23 0911  [START ON 11/22/2023] apixaban, Oral       Stress Ulcer  covered byfamotidine (PEPCID) tablet 20 mg [[id number]]         Significant 24hr Events     24hr events: Patient desatted in the morning. Trach suctioned and placed on trach collar at 12L and nasal cannula at 15L. Oxygen saturation is floating around 90%. Subjective   Review of Systems   Reason unable to perform ROS: Patient refused to speak with me. Psychiatric/Behavioral:  Positive for agitation. Objective                            Vitals I/O      Most Recent Min/Max in 24hrs   Temp 98.5 °F (36.9 °C) Temp  Min: 98.5 °F (36.9 °C)  Max: 98.8 °F (37.1 °C)   Pulse 95 Pulse  Min: 87  Max: 109   Resp (!) 24 Resp  Min: 14  Max: 41   /68 BP  Min: 100/59  Max: 134/74   O2 Sat 98 % SpO2  Min: 85 %  Max: 100 %      Intake/Output Summary (Last 24 hours) at 11/17/2023 1028  Last data filed at 11/17/2023 0600  Gross per 24 hour   Intake 480 ml   Output 700 ml   Net -220 ml       Diet Regular; Regular House    Invasive Monitoring           Physical Exam   Physical Exam  Vitals and nursing note reviewed. Eyes:      Extraocular Movements: Extraocular movements intact. Pupils: Pupils are equal, round, and reactive to light. HENT:      Head: Normocephalic and atraumatic. Neck:      Trachea: Tracheostomy present. Abdominal: General: There is abdominal type feeding tube.   Pulmonary: Effort: Pulmonary effort is normal. No respiratory distress. Comments: Breathing via NC. Neurological:      Mental Status: She is alert. She is agitated. Rest of the exam was deferred as patient did not want to speak with me.        Diagnostic Studies      EKG: NSR  Imaging: n/a      Medications:  Scheduled PRN   acyclovir, 400 mg, BID  amLODIPine, 10 mg, Daily  apixaban, 10 mg, BID  [START ON 11/22/2023] apixaban, 5 mg, BID  budesonide, 0.5 mg, Q12H  busPIRone, 30 mg, TID  chlorhexidine, 15 mL, Q12H ASHLEY  famotidine, 20 mg, BID  fluconazole, 962 mg, Daily  folic acid, 1 mg, Daily  formoterol, 20 mcg, Q12H  gabapentin, 300 mg, TID  levalbuterol, 1.25 mg, Q6H   And  ipratropium, 0.5 mg, Q6H  melatonin, 3 mg, HS  metoprolol tartrate, 25 mg, Q12H [facility]  nicotine, 7 mg, Daily  oxyCODONE, 5 mg, Q8H  polyethylene glycol, 17 g, Daily  QUEtiapine, 50 mg, HS  senna, 17.6 mg, BID  sertraline, 75 mg, Daily  sodium chloride, 4 mL, TID  sulfamethoxazole-trimethoprim, 1 tablet, Once per day on Mon Wed Fri      alteplase, 2 mg, Q1MIN PRN  alteplase, 2 mg, Q1MIN PRN  alteplase, 2 mg, Q1MIN PRN  ondansetron, 4 mg, Q8H PRN  oxyCODONE, 2.5 mg, Q6H PRN       Continuous          Labs:    CBC    Recent Labs     11/16/23  0550 11/16/23  0858 11/17/23  0557   WBC 2.03*  --  4.47   HGB 6.9* 7.3* 6.7*   HCT 22.2*  --  22.3*     --  154   BANDSPCT 20*  --  26*     BMP    Recent Labs     11/16/23  0550 11/17/23  0557   SODIUM 144 144   K 4.3 4.0    106   CO2 32 29   AGAP 7 9   BUN 25 24   CREATININE 1.80* 1.66*   CALCIUM 9.7 9.7       Coags    No recent results     Additional Electrolytes  Recent Labs     11/16/23  0550   MG 1.9          Blood Gas    No recent results  Recent Labs     11/17/23  1000   PHVEN 7.321   [id number] 59.6*   PO2VEN 32.4*   OCD4IIF 30.1*   BEVEN 3.3   [id number] 54.7*    LFTs  No recent results    Infectious  No recent results  Glucose  Recent Labs     11/16/23  0550 11/17/23  0557   GLUC 106 107 Bree Boyd MD

## 2023-11-18 LAB
ABO GROUP BLD BPU: NORMAL
ANION GAP SERPL CALCULATED.3IONS-SCNC: 6 MMOL/L
BPU ID: NORMAL
BUN SERPL-MCNC: 21 MG/DL (ref 5–25)
CALCIUM SERPL-MCNC: 9.6 MG/DL (ref 8.4–10.2)
CHLORIDE SERPL-SCNC: 108 MMOL/L (ref 96–108)
CO2 SERPL-SCNC: 31 MMOL/L (ref 21–32)
CREAT SERPL-MCNC: 1.5 MG/DL (ref 0.6–1.3)
CROSSMATCH: NORMAL
ERYTHROCYTE [DISTWIDTH] IN BLOOD BY AUTOMATED COUNT: 16.9 % (ref 11.6–15.1)
GFR SERPL CREATININE-BSD FRML MDRD: 35 ML/MIN/1.73SQ M
GLUCOSE SERPL-MCNC: 105 MG/DL (ref 65–140)
HCT VFR BLD AUTO: 26.3 % (ref 34.8–46.1)
HGB BLD-MCNC: 8.2 G/DL (ref 11.5–15.4)
MCH RBC QN AUTO: 30.6 PG (ref 26.8–34.3)
MCHC RBC AUTO-ENTMCNC: 31.2 G/DL (ref 31.4–37.4)
MCV RBC AUTO: 98 FL (ref 82–98)
PLATELET # BLD AUTO: 173 THOUSANDS/UL (ref 149–390)
PMV BLD AUTO: 10 FL (ref 8.9–12.7)
POTASSIUM SERPL-SCNC: 4.5 MMOL/L (ref 3.5–5.3)
RBC # BLD AUTO: 2.68 MILLION/UL (ref 3.81–5.12)
SODIUM SERPL-SCNC: 145 MMOL/L (ref 135–147)
UNIT DISPENSE STATUS: NORMAL
UNIT PRODUCT CODE: NORMAL
UNIT PRODUCT VOLUME: 350 ML
UNIT RH: NORMAL
WBC # BLD AUTO: 8.35 THOUSAND/UL (ref 4.31–10.16)

## 2023-11-18 PROCEDURE — 99232 SBSQ HOSP IP/OBS MODERATE 35: CPT | Performed by: INTERNAL MEDICINE

## 2023-11-18 PROCEDURE — 85027 COMPLETE CBC AUTOMATED: CPT

## 2023-11-18 PROCEDURE — 94668 MNPJ CHEST WALL SBSQ: CPT

## 2023-11-18 PROCEDURE — 80048 BASIC METABOLIC PNL TOTAL CA: CPT

## 2023-11-18 PROCEDURE — 94760 N-INVAS EAR/PLS OXIMETRY 1: CPT

## 2023-11-18 PROCEDURE — 94669 MECHANICAL CHEST WALL OSCILL: CPT

## 2023-11-18 PROCEDURE — 94640 AIRWAY INHALATION TREATMENT: CPT

## 2023-11-18 RX ADMIN — SERTRALINE 75 MG: 25 TABLET, FILM COATED ORAL at 09:34

## 2023-11-18 RX ADMIN — FOLIC ACID 1 MG: 1 TABLET ORAL at 09:34

## 2023-11-18 RX ADMIN — OXYCODONE HYDROCHLORIDE 2.5 MG: 5 SOLUTION ORAL at 10:00

## 2023-11-18 RX ADMIN — CHLORHEXIDINE GLUCONATE 15 ML: 1.2 SOLUTION ORAL at 09:38

## 2023-11-18 RX ADMIN — METOPROLOL TARTRATE 25 MG: 25 TABLET, FILM COATED ORAL at 09:35

## 2023-11-18 RX ADMIN — LEVALBUTEROL HYDROCHLORIDE 1.25 MG: 1.25 SOLUTION RESPIRATORY (INHALATION) at 09:40

## 2023-11-18 RX ADMIN — BUDESONIDE 0.5 MG: 0.5 INHALANT ORAL at 09:40

## 2023-11-18 RX ADMIN — LEVALBUTEROL HYDROCHLORIDE 1.25 MG: 1.25 SOLUTION RESPIRATORY (INHALATION) at 20:26

## 2023-11-18 RX ADMIN — IPRATROPIUM BROMIDE 0.5 MG: 0.5 SOLUTION RESPIRATORY (INHALATION) at 13:52

## 2023-11-18 RX ADMIN — AMLODIPINE BESYLATE 10 MG: 5 TABLET ORAL at 09:33

## 2023-11-18 RX ADMIN — ACYCLOVIR 400 MG: 200 CAPSULE ORAL at 09:39

## 2023-11-18 RX ADMIN — SODIUM CHLORIDE SOLN NEBU 3% 4 ML: 3 NEBU SOLN at 09:40

## 2023-11-18 RX ADMIN — BUSPIRONE HYDROCHLORIDE 30 MG: 10 TABLET ORAL at 11:00

## 2023-11-18 RX ADMIN — IPRATROPIUM BROMIDE 0.5 MG: 0.5 SOLUTION RESPIRATORY (INHALATION) at 20:26

## 2023-11-18 RX ADMIN — LEVALBUTEROL HYDROCHLORIDE 1.25 MG: 1.25 SOLUTION RESPIRATORY (INHALATION) at 13:52

## 2023-11-18 RX ADMIN — NICOTINE 7 MG: 7 PATCH, EXTENDED RELEASE TRANSDERMAL at 09:39

## 2023-11-18 RX ADMIN — IPRATROPIUM BROMIDE 0.5 MG: 0.5 SOLUTION RESPIRATORY (INHALATION) at 02:00

## 2023-11-18 RX ADMIN — FORMOTEROL FUMARATE DIHYDRATE 20 MCG: 20 SOLUTION RESPIRATORY (INHALATION) at 20:26

## 2023-11-18 RX ADMIN — ACYCLOVIR 400 MG: 200 CAPSULE ORAL at 17:08

## 2023-11-18 RX ADMIN — BUDESONIDE 0.5 MG: 0.5 INHALANT ORAL at 20:26

## 2023-11-18 RX ADMIN — BUSPIRONE HYDROCHLORIDE 30 MG: 10 TABLET ORAL at 17:00

## 2023-11-18 RX ADMIN — LEVALBUTEROL HYDROCHLORIDE 1.25 MG: 1.25 SOLUTION RESPIRATORY (INHALATION) at 02:00

## 2023-11-18 RX ADMIN — FORMOTEROL FUMARATE DIHYDRATE 20 MCG: 20 SOLUTION RESPIRATORY (INHALATION) at 09:40

## 2023-11-18 RX ADMIN — SODIUM CHLORIDE SOLN NEBU 3% 4 ML: 3 NEBU SOLN at 20:26

## 2023-11-18 RX ADMIN — IPRATROPIUM BROMIDE 0.5 MG: 0.5 SOLUTION RESPIRATORY (INHALATION) at 09:40

## 2023-11-18 RX ADMIN — APIXABAN 10 MG: 5 TABLET, FILM COATED ORAL at 09:33

## 2023-11-18 RX ADMIN — GABAPENTIN 300 MG: 300 CAPSULE ORAL at 09:35

## 2023-11-18 RX ADMIN — OXYCODONE HYDROCHLORIDE 5 MG: 5 SOLUTION ORAL at 12:15

## 2023-11-18 RX ADMIN — GABAPENTIN 300 MG: 300 CAPSULE ORAL at 17:00

## 2023-11-18 RX ADMIN — FAMOTIDINE 20 MG: 20 TABLET, FILM COATED ORAL at 17:08

## 2023-11-18 RX ADMIN — FAMOTIDINE 20 MG: 20 TABLET, FILM COATED ORAL at 09:38

## 2023-11-18 RX ADMIN — SODIUM CHLORIDE SOLN NEBU 3% 4 ML: 3 NEBU SOLN at 13:52

## 2023-11-18 RX ADMIN — FLUCONAZOLE 400 MG: 100 TABLET ORAL at 09:34

## 2023-11-18 RX ADMIN — OXYCODONE HYDROCHLORIDE 5 MG: 5 SOLUTION ORAL at 05:50

## 2023-11-18 NOTE — PROGRESS NOTES
[facility]  Progress Note  Name: Carmel Holly  MRN: [id number]  Unit/Bed#: ICU 03 I Date of Admission: 9/13/2023   Date of Service: 11/18/2023 I Hospital Day: 77    Assessment/Plan   * Acute respiratory failure with hypoxia secondary to oropharyngeal mass  Assessment & Plan  Cuffless trach placed 9/17, transitioned to cuffed on 10/3. Oxygen requirements not improving. For mental health patient is on buspirone, quetiapine, and sertraline. For pain, gabapentin, oxycodone scheduled and prn, prn oxycodone mainly utilized for increased pain during chemo and after a bronch. On Guaifenesin, Atrovent and Xopenex for airway maintenance. No improvement in bilateral consolidation or atelectasis and groundglass opacities on repeat CT and chest x-rays. Bronchial cx with 3 colonies CoNS. CTCAP 10/12:  indicates additional groundglass opacity with paraseptal emphysema, which may be contributing to inability to remain off vent. Bronchoscopy performed and unremarkable. Samples sent to lab for culture. Patient was placed back on vent s/p procedure. Now on high flow. Completed 7 day course of cefepime and Vancomycin. Bronchial culture and gram stain negative. Micro negative for CMV, AFB, Fungitell, Pneumocystis jiroveci (carinii), Histoplasma, Aspergillus. Trach secretions sent for sputum culture shows 2+ polys, 1+ gram-positive cocci in pairs, chains and clusters and 1+ gram-negative rods. 11/14 Discussion regarding possibilities of discharge done with family. Family shows understanding of patient's condition. 11/15 Trach is capped and patient is placed on 3 L of NC. Cuffless trach was placed 9/17. Replaced with a cuffed Shiley XLT #7 10/3    Plan:  Continue oxygen via NC. Downtitrate as needed. Suctioning via trach if patient is desatting and unable to expectorate phlegm. Likely Chemo induced pneumonitis.    Continue inhalation treatment with Pulmicort and formoterol q12 , duonebs QID.  Regular diet  Daily PT/OT  Discussion with family and CM that patient will go home with oxygen and would need to be admitted to the hospital for subsequent chemotherapy sessions. Heme/oncology will f/u with patient as she required PET-CT scan after discharge to further assess size of the mass for chemotherapy sessions. Large cell lymphoma ([address])  Assessment & Plan  Large B-cell lymphoma, stage II. First chemo cycle 9/22 4 days of R-EPOCH. 9/27 Rituxan. 9/28 Filgrastim. Received nivestym 300 mc 7 days dcd on 10/26      Prophylaxis:  Bactrim prophylaxis Monday Wednesday Friday,  Acyclovir 400 mg twice daily  Fluconazole 200 mg daily  Levofloxacin 250 mg daily  Lovenox 40 mg daily    9/22  4 days of R-EPOCH.  9/27 Rituxan  10/13 for [facility] cycle 2, which was done over 4 days and cytoxan on 10/19.  11/6 completed R-EPOCH cycle 3 and Cytoxan 11/7. Plan:  CT neck w wo contrast 11/15 (showed IJ thrombus as above)  Oncology is following. Next chemotherapy cycle will be planned after the size of the mass is assessed. Plan for repeat PET-CT upon hospital discharge to evaluate disease response. Transfuse blood products as needed. Keep Hgb>7 and Plt>20 for chemo   See acute respiratory failure above    Acute embolism and thrombosis of right internal jugular vein (HCC)  Assessment & Plan  CT soft tissues of the neck: Nonocclusive thrombus noted within the right internal jugular vein just above the Mediport entrance site into the internal jugular vein. No signs of pain, headaches, vision changes. Plan:  Eliquis 10 mg for 7 days. Switch to Eliquis 5 mg starting 11/22. Oropharyngeal mass  Assessment & Plan  9/14 Neck/ soft tissue CT: Large enhancing soft tissue mass identified within the left neck involving multiple spaces. Centered within the left oropharynx with extensions as described above into multiple spaces. This results in significant airway compromise. 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 Small level 3/level 4 nodes are seen within the neck. Tracheostomy by ENT. Replaced on 9/26. H/o tobacco abuse, daily smoker. On nicotine while inpatient, confirmed with patient she needs nicotine patch. CT soft tissue neck shows reduced mass effect from 9/14 to 10/13. Can consider reducing trach size if continues to improve. Patient often refusing peg tube feeds but is feeding herself small meals orally. Plan:  ENT and heme onc following  As per speech therapist diet  Dysphagia 2. Continue parallel PEG tube feeds for nutrition. See acute respiratory failure and large B cell lymphoma above. Pancytopenia due to chemotherapy [facility])  Assessment & Plan  Recent Labs     11/17/23  1000 11/17/23  1453 11/18/23  0538   HGB 6.9* 8.6* 8.2*     '  Patient received 1 PRBC transfusion on 10/5 and 10/25. Her B/L hemoglobin is 12-14. No sites of bleeding, no black stools. Iron panel indicative of inflammatory anemia. Normal Vitamin B12 levels. Folate low at 5.5. Filgrastim 4 doses administered by heme/onc. Plan:  Trend hemoglobin and transfuse for <7. As per heme/onc transfuse irradiated and leukoreduced blood products. Trend platelets  Folic acid 1mg daily supplementation  As per conversation with Heme/oncology attending, patient pancytopenia associated with chemotherapy is expected/predictable. No further anemia w/u required. Low ANC management as per heme/onc    Blister (nonthermal) of left forearm, sequela  Assessment & Plan  Blisters of left upper extremity healing, no signs of infection, continue daily wound care. (HFpEF) heart failure with preserved ejection fraction [facility])  Assessment & Plan  Wt Readings from Last 3 Encounters:   11/16/23 79 kg (174 lb 2.6 oz)   09/07/23 74.6 kg (164 lb 6.4 oz)   08/30/23 73.3 kg (161 lb 9.6 oz)     Lab Results   Component Value Date    LVEF 60 08/28/2023     (H) 10/28/2023     (H) 09/29/2023     Echo 8/28/23: LVEF 60%.        Plan:  K > 4   Measure I/O      Ambulatory dysfunction  Assessment & Plan  2/2 Impacted by chronic pain syndrome + prolonged hospital stay. Continue OOB with PT. PT rec post acute rehab however reportedly Cannot get LTACH placement while getting chemo per CM  She is aware STR SNFs continue to follow and treatment can be done from a SNF level of care. PT would need to be on trach collar for at least 72 hours with no need for the vent. Aware that pt would need to be around 28% FiO2     Depression  Assessment & Plan  Patient on Zoloft 75 daily and Seroquel hs  Patient is depressed, multiple family/palliative discussions. patient is firm that she wants to live and to fight, she is just tired. Currently goal is disease treatment oriented. Full code. No SI/HI ideations. Palliative signed off, will reconsult if patient changes her mind regarding wishes. Chronic Superficial thrombophlebitis  Assessment & Plan  Right forearm soreness. RUE US: No acute or chronic deep vein thrombosis. 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 PO not severe enough to require renal dosing at this time, will continue to monitor and adjust dosing as needed. Continue DVT ppx with Lovenox 40    CKD (chronic kidney disease) stage 3, GFR 30-59 ml/min [facility])  Assessment & Plan  Lab Results   Component Value Date    EGFR 31 11/17/2023    EGFR 28 11/16/2023    EGFR 31 11/15/2023    CREATININE 1.66 (H) 11/17/2023    CREATININE 1.80 (H) 11/16/2023    CREATININE 1.63 (H) 11/15/2023     Baseline Cr between 0.75-1.1  Initial PO felt 2/2 prerenal azotemia 2/2 diuresis, reduced PO intake +/- ATN. Patient received 2 doses of Bumex IV 2 g as she had not been responding appropriately to IV 40 Lasix daily. Creatinine went up by 0.5 meeting criteria for an PO. Suspect most likely due to medications as a combination of prerenal and intrinsic. FENa was 0.2%, UA shows no casts or signs of infection, urine eosinophils 0%.  Patient likely has prerenal HERIBERTO from volume depletion. Received 2 L NS and 1 pRBC and cr went up by 0.07 still and Na and Cl went down, suspect that volume prevented further cr rise and free water excretion impaired by kidney function, allowing hyponatremia to increase. Suspect that now that nephrotoxic medications have been stopped she responded well to Lasix and creatinine downtrended. HERIBERTO now resolved. Will be cautious about nephrotoxins and monitor as restarting EPOCH and ppx. Patient gets volume depleted and overloaded very easily. Especially from chemo. 10/19 Heriberto as cr went up by 0.3 in 48 hours from 0.82 on 10/17 to 1.22 on 10/19. Suspect this is prerenal hypovolemic, will see if patient improves with fluids. She gets overloaded easily so if no improvement suspect CRS again. 10/29 HERIBERTO cr went up by 0.3 again. She has 922 ml UOP in last 24 hours. HERIBERTO likely from lasix 40mg IV given yesterday. She is not hypovolemic.    11/17 HERIBERTO creatinine up to 1.8. Isolyte bolus 500 given. Plan:   Creatinine downtrended to 1.66 yesterday, AM BMP pending  Continue to monitor UO/renal function. Avoid nephrotoxic agents  Continue to monitor a.m. BMP. Pain syndrome, chronic  Assessment & Plan  Home regimen: Oxycodone 5 mg BID, Tylenol 650 mg q8 prn. Gabapentin 600 mg TID. Medical marijuana. Lumbar radiculopathy and impaired ambulatory dysfunction secondary to pain. Has bowel regimen and is having bowel movements daily. Patient required additional pain management during previous cycle and has some additional pain from tunneled line placement    Plan:  Continue gabapentin 300 mg TID, oxycodone 5 mg TID, prn Tylenol 650 mg q6. Oxycodone 5mg every 8 hours  Oxycodone 2.5 mg q6 PRN    Benign essential hypertension  Assessment & Plan  Home regimen Coreg 12.5 mg BID, Amlodipine 10 mg daily, and lisinopril 40 mg.  All discontinued at this time secondary to hypotension and HERIBERTO since cycle 1. but stable currently without any antihypertensives. Systolic persistently elevated and tachycardic, potentially secondary to pain. Patient was more hypotensive during last chemo. Coreg contraindicated during chemo, since cycles are every other week, would be better to stick with lopressor during duration of therapy as opposed to switching constantly. Plan:  Monitor vitals. Continue Norvasc 10 and lopressor 25 bid  Prn hydralazine for SBP > 160    Protein-calorie malnutrition (HCC)  Assessment & Plan  Malnutrition Findings:   Adult Malnutrition type: Chronic illness  Adult Degree of Malnutrition: Unspecified protein calorie malnutrition  Malnutrition Characteristics: Inadequate energy, Other (comment) (stress of illness)                  360 Statement: Protein calorie malnutrition r/t inadequate intake as evidenced by >7 day period of poor intakes and stress of infection; currently treated with regular diet and nutrition supplements    BMI Findings: Body mass index is 33.31 kg/m².              ----------------------------------------------------------------------------------------  HPI/24hr events: Patient currently on 15 L via mid flow cath in addition to trach coller with 70% FiO2. Patient denies pain. No acute distress. Declines interview this morning. Disposition: Plan for discharge to home when respiratory status stabilized per patient and family request. CM following. Code Status: Level 1 - Full Code  ---------------------------------------------------------------------------------------  SUBJECTIVE  Patient denies pain. No acute distress. Declines interview this morning. Review of Systems   Reason unable to perform ROS: Patient refused interview. Psychiatric/Behavioral:  Positive for agitation.       Review of systems was unable to be performed secondary to patient declined  ---------------------------------------------------------------------------------------  OBJECTIVE    Vitals   Vitals:    11/18/23 0302 23 0400 23 0500 23 0600   BP:       BP Location:       Pulse: 87 90 91 101   Resp: 14 15 15 (!) 25   Temp:       TempSrc:       SpO2: (!) 89% (!) 88% 91% 92%   Weight:       Height:         Temp (24hrs), Av.5 °F (36.9 °C), Min:97.4 °F (36.3 °C), Max:98.8 °F (37.1 °C)  Current: Temperature: 98.7 °F (37.1 °C)          Respiratory:  SpO2: SpO2: 92 %  Nasal Cannula O2 Flow Rate (L/min): 15 L/min    Invasive/non-invasive ventilation settings   Respiratory      Lab Data (Last 4 hours)      None           O2/Vent Data (Last 4 hours)      None                    Physical Exam  Vitals and nursing note reviewed. Constitutional:       General: She is awake. She is not in acute distress. Appearance: She is ill-appearing. She is not toxic-appearing. HENT:      Head: Normocephalic and atraumatic. Right Ear: External ear normal.      Left Ear: External ear normal.   Eyes:      General: No scleral icterus. Right eye: No discharge. Left eye: No discharge. Extraocular Movements: Extraocular movements intact. Conjunctiva/sclera: Conjunctivae normal.      Pupils: Pupils are equal, round, and reactive to light. Cardiovascular:      Rate and Rhythm: Normal rate. Pulmonary:      Effort: Pulmonary effort is normal. No tachypnea, bradypnea, accessory muscle usage or respiratory distress. Abdominal:      General: There is no distension. Musculoskeletal:      Cervical back: Normal range of motion. Right lower leg: No edema. Left lower leg: No edema. Skin:     General: Skin is dry. Coloration: Skin is not pale. Neurological:      Mental Status: She is alert and oriented to person, place, and time. Cranial Nerves: No dysarthria or facial asymmetry. Psychiatric:         Attention and Perception: Attention normal.         Mood and Affect: Affect is angry. Speech: Speech normal.         Behavior: Behavior is agitated.          Thought Content: Thought content normal.         Cognition and Memory: Cognition and memory normal.         Judgment: Judgment normal.       Laboratory and Diagnostics:  Results from last 7 days   Lab Units 11/18/23  0538 11/17/23  1453 11/17/23  1000 11/17/23  0557 11/16/23  0858 11/16/23  0550 11/15/23  0641 11/14/23  0440 11/13/23  0705 11/13/23  0507 11/12/23  1158 11/12/23  0542   WBC Thousand/uL 8.35  --   --  4.47  --  2.03* 1.40* 1.18* 0.87* 0.96*  --  2.20*   HEMOGLOBIN g/dL 8.2* 8.6* 6.9* 6.7* 7.3* 6.9* 7.2* 7.0* 7.3* 7.2*   < > 6.0*   HEMATOCRIT % 26.3*  --   --  22.3*  --  22.2* 23.1* 22.3* 22.7* 23.3*   < > 19.4*   PLATELETS Thousands/uL 173  --   --  154  --  151 155 158 176 192  --  222   NEUTROS PCT %  --   --   --   --   --   --  35*  --   --   --   --  69   BANDS PCT %  --   --   --  26*  --  20*  --  1  --  1  --   --    MONOS PCT %  --   --   --   --   --   --  12  --   --   --   --  1*   MONO PCT %  --   --   --  4  --  12  --  5 2* 4  --   --    EOS PCT %  --   --   --  1  --  0 5 3 6 2  --  3    < > = values in this interval not displayed.      Results from last 7 days   Lab Units 11/17/23  0557 11/16/23  0550 11/15/23  0641 11/14/23 0440 11/13/23  0507 11/12/23  0448   SODIUM mmol/L 144 144 145 144 146 145   POTASSIUM mmol/L 4.0 4.3 4.1 3.4* 3.5 3.5   CHLORIDE mmol/L 106 105 106 107 108 107   CO2 mmol/L 29 32 32 34* 33* 32   ANION GAP mmol/L 9 7 7 3 5 6   BUN mg/dL 24 25 22 22 31* 42*   CREATININE mg/dL 1.66* 1.80* 1.63* 1.01 0.99 1.24   CALCIUM mg/dL 9.7 9.7 9.8 9.6 9.6 9.5   GLUCOSE RANDOM mg/dL 107 106 118 105 95 105     Results from last 7 days   Lab Units 11/16/23  0550 11/15/23  0641 11/14/23  0440 11/13/23  0507 11/12/23  0448   MAGNESIUM mg/dL 1.9 1.8* 2.1 1.8* 1.9   PHOSPHORUS mg/dL  --   --  3.7 3.5 3.9                   ABG:    VBG:  Results from last 7 days   Lab Units 11/17/23  1000   PH PERICO  7.321   PCO2 PERICO mm Hg 59.6*   PO2 PERICO mm Hg 32.4*   HCO3 PERICO mmol/L 30.1*   BASE EXC PERICO mmol/L 3.3 Micro        EKG: Sinus tachycardia with PVCs  Imaging: I have personally reviewed pertinent reports. Intake and Output  I/O         11/16 0701  11/17 0700 11/17 0701  11/18 0700    P. O. 720 720    Blood  694.2    NG/GT  100    Total Intake(mL/kg) 720 (9.1) 1514.2 (19.2)    Urine (mL/kg/hr) 750 (0.4) 400 (0.2)    Total Output 750 400    Net -30 +1114.2                  Height and Weights   Height: 5' 0.63" (154 cm)  IBW (Ideal Body Weight): 46.95 kg  Body mass index is 33.31 kg/m². Weight (last 2 days)       Date/Time Weight    11/16/23 0550 79 (174.16)              Nutrition       Diet Orders   (From admission, onward)                 Start     Ordered    11/16/23 1054  Dietary nutrition supplements  Once        Question Answer Comment   Select Supplement: Ensure Plus High Protein Chocolate    Frequency Dinner        11/16/23 1054    11/16/23 1054  Dietary nutrition supplements  Once        Question Answer Comment   Select Supplement: Ensure Plus High Protein Vanilla    Frequency Lunch        11/16/23 1054    11/16/23 1054  Dietary nutrition supplements  Once        Question Answer Comment   Select Supplement: Ensure Plus High Protein Strawberry    Frequency Breakfast        11/16/23 1054    11/14/23 1443  Diet Regular; Regular House  Diet effective now        References:    Adult Nutrition Support Algorithm    RD Therapeutic Diet Order Protocol   Question Answer Comment   Diet Type Regular    Regular Regular House    RD to adjust diet per protocol?  Yes        11/14/23 1443                      Active Medications  Scheduled Meds:  Current Facility-Administered Medications   Medication Dose Route Frequency Provider Last Rate    acyclovir  400 mg Oral BID RANDEE Chavarria      alteplase  2 mg Intracatheter Q1MIN PRN [place] Chai, CRNP      alteplase  2 mg Intracatheter Q1MIN PRN [place] Chai, CRNP      alteplase  2 mg Intracatheter Q1MIN PRN Martha Farris MD      amLODIPine  10 mg Oral Daily Maria Guadalupe Mercado, RANDEE      apixaban  10 mg Oral BID Zehra Lui MD      [START ON 11/22/2023] apixaban  5 mg Oral BID Zehra Lui MD      budesonide  0.5 mg Nebulization Q12H Maria Guadalupe SORAIDA Mercado, ANDREANP      busPIRone  30 mg Oral TID Eura Pinna, CRNP      chlorhexidine  15 mL Mouth/Throat Q12H [address] [facility] Jess, CRNP      famotidine  20 mg Oral BID Maria Guadalupekorey Mercado, CRNP      fluconazole  400 mg Oral Daily [facility] SORAIDA Mercado, CRNP      folic acid  1 mg Oral Daily Maria Guadalupekorey Mercado, CRNP      formoterol  20 mcg Nebulization Q12H [facility] SORAIDA Mercado, ANDREANP      gabapentin  300 mg Oral TID Eura Pinna, CRNP      levalbuterol  1.25 mg Nebulization Q6H Maria Guadalupetonja Mercado, ANDREANP      And    ipratropium  0.5 mg Nebulization Q6H Maria Guadalupekorey Mercado, ANDREANP      melatonin  3 mg Oral HS Maria Guadalupekorey Mercado, CRNP      metoprolol tartrate  25 mg Oral Q12H [address] [facility] SORAIDA Mercado, CRNP      nicotine  7 mg Transdermal Daily Maria Guadalupekorey Mercado, RANDEE      ondansetron  4 mg Intravenous Q8H PRN Eura Pinna, CRNP      oxyCODONE  2.5 mg Oral Q6H PRN Eura Pinna, CRNP      oxyCODONE  5 mg Oral Q8H Maria Guadalupekorey Mercado, ANDREANP      polyethylene glycol  17 g Oral Daily Jignesh Stern PA-C      QUEtiapine  50 mg Oral HS Maria Guadalupekorey Mercado, RANDEE      senna  17.6 mg Oral BID Jignesh Stern PA-C      sertraline  75 mg Per PEG Tube Daily Maria Guadalupe Mercado, RANDEE      sodium chloride  4 mL Nebulization TID Laurie Dickson MD      sulfamethoxazole-trimethoprim  1 tablet Oral Once per day on Mon Wed Fri RANDEE Chavarria       Continuous Infusions:     PRN Meds:   alteplase, 2 mg, Q1MIN PRN  alteplase, 2 mg, Q1MIN PRN  alteplase, 2 mg, Q1MIN PRN  ondansetron, 4 mg, Q8H PRN  oxyCODONE, 2.5 mg, Q6H PRN        Invasive Devices Review  Invasive Devices       Central Venous Catheter Line  Duration             CVC Central Lines 10/19/23 Double Right Internal jugular 29 days              Drain  Duration Gastrostomy/Enterostomy Percutaneous Endoscopic Gastrostomy (PEG) 16 Fr. LUQ 51 days              Airway  Duration             Surgical Airway Shiley Cuffed 61 days                  ---------------------------------------------------------------------------------------  Advance Directive and Living Will:      Power of :    POLST:    ---------------------------------------------------------------------------------------  Care Time Delivered:   Upon my evaluation, this patient had a high probability of imminent or life-threatening deterioration due to respiratory failure, which required my direct attention, intervention, and personal management. I have personally provided 21 minutes (645 to 7:06) of critical care time, exclusive of procedures, teaching, family meetings, and any prior time recorded by providers other than myself. Radames Pike MD      Portions of the record may have been created with voice recognition software. Occasional wrong word or "sound a like" substitutions may have occurred due to the inherent limitations of voice recognition software.   Read the chart carefully and recognize, using context, where substitutions have occurred

## 2023-11-18 NOTE — PLAN OF CARE
Problem: MOBILITY - ADULT  Goal: Maintain or return to baseline ADL function  Description: INTERVENTIONS:  -  Assess patient's ability to carry out ADLs; assess patient's baseline for ADL function and identify physical deficits which impact ability to perform ADLs (bathing, care of mouth/teeth, toileting, grooming, dressing, etc.)  - Assess/evaluate cause of self-care deficits   - Assess range of motion  - Assess patient's mobility; develop plan if impaired  - Assess patient's need for assistive devices and provide as appropriate  - Encourage maximum independence but intervene and supervise when necessary  - Involve family in performance of ADLs  - Assess for home care needs following discharge   - Consider OT consult to assist with ADL evaluation and planning for discharge  - Provide patient education as appropriate  Outcome: Progressing  Goal: Maintains/Returns to pre admission functional level  Description: INTERVENTIONS:  - Perform BMAT or MOVE assessment daily.   - Set and communicate daily mobility goal to care team and patient/family/caregiver. - Collaborate with rehabilitation services on mobility goals if consulted  - Perform Range of Motion  times a day. - Reposition patient every 2 hours.   - Dangle patient  times a day  - Stand patient  times a day  - Ambulate patient  times a day  - Out of bed to chair  times a day   - Out of bed for meals  times a day  - Out of bed for toileting  - Record patient progress and toleration of activity level   Outcome: Progressing     Problem: Prexisting or High Potential for Compromised Skin Integrity  Goal: Skin integrity is maintained or improved  Description: INTERVENTIONS:  - Identify patients at risk for skin breakdown  - Assess and monitor skin integrity  - Assess and monitor nutrition and hydration status  - Monitor labs   - Assess for incontinence   - Turn and reposition patient  - Assist with mobility/ambulation  - Relieve pressure over bony prominences  - Avoid friction and shearing  - Provide appropriate hygiene as needed including keeping skin clean and dry  - Evaluate need for skin moisturizer/barrier cream  - Collaborate with interdisciplinary team   - Patient/family teaching  - Consider wound care consult   Outcome: Progressing     Problem: Nutrition/Hydration-ADULT  Goal: Nutrient/Hydration intake appropriate for improving, restoring or maintaining nutritional needs  Description: Monitor and assess patient's nutrition/hydration status for malnutrition. Collaborate with interdisciplinary team and initiate plan and interventions as ordered. Monitor patient's weight and dietary intake as ordered or per policy. Utilize nutrition screening tool and intervene as necessary. Determine patient's food preferences and provide high-protein, high-caloric foods as appropriate.      INTERVENTIONS:  - Monitor oral intake, urinary output, labs, and treatment plans  - Assess nutrition and hydration status and recommend course of action  - Evaluate amount of meals eaten  - Assist patient with eating if necessary   - Allow adequate time for meals  - Recommend/ encourage appropriate diets, oral nutritional supplements, and vitamin/mineral supplements  - Order, calculate, and assess calorie counts as needed  - Recommend, monitor, and adjust tube feedings and TPN/PPN based on assessed needs  - Assess need for intravenous fluids  - Provide specific nutrition/hydration education as appropriate  - Include patient/family/caregiver in decisions related to nutrition  Outcome: Progressing     Problem: PAIN - ADULT  Goal: Verbalizes/displays adequate comfort level or baseline comfort level  Description: Interventions:  - Encourage patient to monitor pain and request assistance  - Assess pain using appropriate pain scale  - Administer analgesics based on type and severity of pain and evaluate response  - Implement non-pharmacological measures as appropriate and evaluate response  - Consider cultural and social influences on pain and pain management  - Notify physician/advanced practitioner if interventions unsuccessful or patient reports new pain  Outcome: Progressing     Problem: INFECTION - ADULT  Goal: Absence or prevention of progression during hospitalization  Description: INTERVENTIONS:  - Assess and monitor for signs and symptoms of infection  - Monitor lab/diagnostic results  - Monitor all insertion sites, i.e. indwelling lines, tubes, and drains  - Monitor endotracheal if appropriate and nasal secretions for changes in amount and color  - [place] appropriate cooling/warming therapies per order  - Administer medications as ordered  - Instruct and encourage patient and family to use good hand hygiene technique  - Identify and instruct in appropriate isolation precautions for identified infection/condition  Outcome: Progressing  Goal: Absence of fever/infection during neutropenic period  Description: INTERVENTIONS:  - Monitor WBC    Outcome: Progressing     Problem: SAFETY ADULT  Goal: Maintain or return to baseline ADL function  Description: INTERVENTIONS:  -  Assess patient's ability to carry out ADLs; assess patient's baseline for ADL function and identify physical deficits which impact ability to perform ADLs (bathing, care of mouth/teeth, toileting, grooming, dressing, etc.)  - Assess/evaluate cause of self-care deficits   - Assess range of motion  - Assess patient's mobility; develop plan if impaired  - Assess patient's need for assistive devices and provide as appropriate  - Encourage maximum independence but intervene and supervise when necessary  - Involve family in performance of ADLs  - Assess for home care needs following discharge   - Consider OT consult to assist with ADL evaluation and planning for discharge  - Provide patient education as appropriate  Outcome: Progressing  Goal: Maintains/Returns to pre admission functional level  Description: INTERVENTIONS:  - Perform BMAT or MOVE assessment daily.   - Set and communicate daily mobility goal to care team and patient/family/caregiver. - Collaborate with rehabilitation services on mobility goals if consulted  - Perform Range of Motion  times a day. - Reposition patient every 2 hours.   - Dangle patient  times a day  - Stand patient  times a day  - Ambulate patient  times a day  - Out of bed to chair  times a day   - Out of bed for meals  times a day  - Out of bed for toileting  - Record patient progress and toleration of activity level   Outcome: Progressing  Goal: Patient will remain free of falls  Description: INTERVENTIONS:  - Educate patient/family on patient safety including physical limitations  - Instruct patient to call for assistance with activity   - Consult OT/PT to assist with strengthening/mobility   - Keep Call bell within reach  - Keep bed low and locked with side rails adjusted as appropriate  - Keep care items and personal belongings within reach  - Initiate and maintain comfort rounds  - Make Fall Risk Sign visible to staff  - Offer Toileting every 2 Hours, in advance of need  - Initiate/Maintain bed alarm  - Obtain necessary fall risk management equipment:   - Apply yellow socks and bracelet for high fall risk patients  - Consider moving patient to room near nurses station  Outcome: Progressing     Problem: DISCHARGE PLANNING  Goal: Discharge to home or other facility with appropriate resources  Description: INTERVENTIONS:  - Identify barriers to discharge w/patient and caregiver  - Arrange for needed discharge resources and transportation as appropriate  - Identify discharge learning needs (meds, wound care, etc.)  - Arrange for interpretive services to assist at discharge as needed  - Refer to Case Management Department for coordinating discharge planning if the patient needs post-hospital services based on physician/advanced practitioner order or complex needs related to functional status, cognitive ability, or social support system  Outcome: Progressing     Problem: Knowledge Deficit  Goal: Patient/family/caregiver demonstrates understanding of disease process, treatment plan, medications, and discharge instructions  Description: Complete learning assessment and assess knowledge base.   Interventions:  - Provide teaching at level of understanding  - Provide teaching via preferred learning methods  Outcome: Progressing     Problem: RESPIRATORY - ADULT  Goal: Achieves optimal ventilation and oxygenation  Description: INTERVENTIONS:  - Assess for changes in respiratory status  - Assess for changes in mentation and behavior  - Position to facilitate oxygenation and minimize respiratory effort  - Oxygen administered by appropriate delivery if ordered  - Initiate smoking cessation education as indicated  - Encourage broncho-pulmonary hygiene including cough, deep breathe, Incentive Spirometry  - Assess the need for suctioning and aspirate as needed  - Assess and instruct to report SOB or any respiratory difficulty  - Respiratory Therapy support as indicated  Outcome: Progressing     Problem: GENITOURINARY - ADULT  Goal: Maintains or returns to baseline urinary function  Description: INTERVENTIONS:  - Assess urinary function  - Encourage oral fluids to ensure adequate hydration if ordered  - Administer IV fluids as ordered to ensure adequate hydration  - Administer ordered medications as needed  - Offer frequent toileting  - Follow urinary retention protocol if ordered  Outcome: Progressing  Goal: Absence of urinary retention  Description: INTERVENTIONS:  - Assess patient’s ability to void and empty bladder  - Monitor I/O  - Bladder scan as needed  - Discuss with physician/AP medications to alleviate retention as needed  - Discuss catheterization for long term situations as appropriate  Outcome: Progressing     Problem: METABOLIC, FLUID AND ELECTROLYTES - ADULT  Goal: Electrolytes maintained within normal limits  Description: INTERVENTIONS:  - Monitor labs and assess patient for signs and symptoms of electrolyte imbalances  - Administer electrolyte replacement as ordered  - Monitor response to electrolyte replacements, including repeat lab results as appropriate  - Instruct patient on fluid and nutrition as appropriate  Outcome: Progressing  Goal: Fluid balance maintained  Description: INTERVENTIONS:  - Monitor labs   - Monitor I/O and WT  - Instruct patient on fluid and nutrition as appropriate  - Assess for signs & symptoms of volume excess or deficit  Outcome: Progressing  Goal: Glucose maintained within target range  Description: INTERVENTIONS:  - Monitor Blood Glucose as ordered  - Assess for signs and symptoms of hyperglycemia and hypoglycemia  - Administer ordered medications to maintain glucose within target range  - Assess nutritional intake and initiate nutrition service referral as needed  Outcome: Progressing

## 2023-11-18 NOTE — ASSESSMENT & PLAN NOTE
Large B-cell lymphoma, stage II. First chemo cycle 9/22 4 days of R-EPOCH. 9/27 Rituxan. 9/28 Filgrastim. Received nivestym 300 mc 7 days dcd on 10/26      Prophylaxis:  Bactrim prophylaxis Monday Wednesday Friday,  Acyclovir 400 mg twice daily  Fluconazole 200 mg daily  Levofloxacin 250 mg daily  Lovenox 40 mg daily    9/22  4 days of R-EPOCH.  9/27 Rituxan  10/13 for [facility] cycle 2, which was done over 4 days and cytoxan on 10/19.  11/6 completed R-EPOCH cycle 3 and Cytoxan 11/7. Plan:  CT neck w wo contrast 11/15 (showed IJ thrombus as above)  Oncology is following. Next chemotherapy cycle will be planned after the size of the mass is assessed. Plan for repeat PET-CT upon hospital discharge to evaluate disease response. Transfuse blood products as needed.   Keep Hgb>7 and Plt>20 for chemo   See acute respiratory failure above

## 2023-11-18 NOTE — ASSESSMENT & PLAN NOTE
Recent Labs     11/17/23  1000 11/17/23  1453 11/18/23  0538   HGB 6.9* 8.6* 8.2*     '  Patient received 1 PRBC transfusion on 10/5 and 10/25. Her B/L hemoglobin is 12-14. No sites of bleeding, no black stools. Iron panel indicative of inflammatory anemia. Normal Vitamin B12 levels. Folate low at 5.5. Filgrastim 4 doses administered by heme/onc. Plan:  Trend hemoglobin and transfuse for <7. As per heme/onc transfuse irradiated and leukoreduced blood products. Trend platelets  Folic acid 1mg daily supplementation  As per conversation with Heme/oncology attending, patient pancytopenia associated with chemotherapy is expected/predictable. No further anemia w/u required.   Low ANC management as per heme/onc

## 2023-11-18 NOTE — ASSESSMENT & PLAN NOTE
Lab Results   Component Value Date    EGFR 31 11/17/2023    EGFR 28 11/16/2023    EGFR 31 11/15/2023    CREATININE 1.66 (H) 11/17/2023    CREATININE 1.80 (H) 11/16/2023    CREATININE 1.63 (H) 11/15/2023     Baseline Cr between 0.75-1.1  Initial HERIBERTO felt 2/2 prerenal azotemia 2/2 diuresis, reduced PO intake +/- ATN. Patient received 2 doses of Bumex IV 2 g as she had not been responding appropriately to IV 40 Lasix daily. Creatinine went up by 0.5 meeting criteria for an HERIBERTO. Suspect most likely due to medications as a combination of prerenal and intrinsic. FENa was 0.2%, UA shows no casts or signs of infection, urine eosinophils 0%. Patient likely has prerenal HERIBERTO from volume depletion. Received 2 L NS and 1 pRBC and cr went up by 0.07 still and Na and Cl went down, suspect that volume prevented further cr rise and free water excretion impaired by kidney function, allowing hyponatremia to increase. Suspect that now that nephrotoxic medications have been stopped she responded well to Lasix and creatinine downtrended. HERIBERTO now resolved. Will be cautious about nephrotoxins and monitor as restarting EPOCH and ppx. Patient gets volume depleted and overloaded very easily. Especially from chemo. 10/19 Heriberto as cr went up by 0.3 in 48 hours from 0.82 on 10/17 to 1.22 on 10/19. Suspect this is prerenal hypovolemic, will see if patient improves with fluids. She gets overloaded easily so if no improvement suspect CRS again. 10/29 HERIBERTO cr went up by 0.3 again. She has 922 ml UOP in last 24 hours. HERIBERTO likely from lasix 40mg IV given yesterday. She is not hypovolemic.    11/17 HERIBERTO creatinine up to 1.8. Isolyte bolus 500 given. Plan:   Creatinine downtrended to 1.66 yesterday, AM BMP pending  Continue to monitor UO/renal function. Avoid nephrotoxic agents  Continue to monitor a.m. BMP.

## 2023-11-19 LAB
ANION GAP SERPL CALCULATED.3IONS-SCNC: 6 MMOL/L
ARTERIAL PATENCY WRIST A: YES
BASE EXCESS BLDA CALC-SCNC: 3.1 MMOL/L
BUN SERPL-MCNC: 19 MG/DL (ref 5–25)
CALCIUM SERPL-MCNC: 9.8 MG/DL (ref 8.4–10.2)
CHLORIDE SERPL-SCNC: 107 MMOL/L (ref 96–108)
CO2 SERPL-SCNC: 30 MMOL/L (ref 21–32)
CREAT SERPL-MCNC: 1.24 MG/DL (ref 0.6–1.3)
ERYTHROCYTE [DISTWIDTH] IN BLOOD BY AUTOMATED COUNT: 16.2 % (ref 11.6–15.1)
GFR SERPL CREATININE-BSD FRML MDRD: 44 ML/MIN/1.73SQ M
GLUCOSE SERPL-MCNC: 128 MG/DL (ref 65–140)
HCO3 BLDA-SCNC: 28.7 MMOL/L (ref 22–28)
HCT VFR BLD AUTO: 27.5 % (ref 34.8–46.1)
HFNC FLOW LPM: 50
HGB BLD-MCNC: 8.5 G/DL (ref 11.5–15.4)
MCH RBC QN AUTO: 29.9 PG (ref 26.8–34.3)
MCHC RBC AUTO-ENTMCNC: 30.9 G/DL (ref 31.4–37.4)
MCV RBC AUTO: 97 FL (ref 82–98)
NON VENT HFNC FIO2: 65
NON VENT TYPE HFNC: ABNORMAL
O2 CT BLDA-SCNC: 15.5 ML/DL (ref 16–23)
OXYHGB MFR BLDA: 93.1 % (ref 94–97)
PCO2 BLDA: 48.5 MM HG (ref 36–44)
PH BLDA: 7.39 [PH] (ref 7.35–7.45)
PLATELET # BLD AUTO: 180 THOUSANDS/UL (ref 149–390)
PMV BLD AUTO: 9.5 FL (ref 8.9–12.7)
PO2 BLDA: 70.5 MM HG (ref 75–129)
POTASSIUM SERPL-SCNC: 4.3 MMOL/L (ref 3.5–5.3)
RBC # BLD AUTO: 2.84 MILLION/UL (ref 3.81–5.12)
SODIUM SERPL-SCNC: 143 MMOL/L (ref 135–147)
SPECIMEN SOURCE: ABNORMAL
WBC # BLD AUTO: 9.4 THOUSAND/UL (ref 4.31–10.16)

## 2023-11-19 PROCEDURE — 82805 BLOOD GASES W/O2 SATURATION: CPT

## 2023-11-19 PROCEDURE — 94002 VENT MGMT INPAT INIT DAY: CPT

## 2023-11-19 PROCEDURE — 99232 SBSQ HOSP IP/OBS MODERATE 35: CPT | Performed by: INTERNAL MEDICINE

## 2023-11-19 PROCEDURE — 94760 N-INVAS EAR/PLS OXIMETRY 1: CPT

## 2023-11-19 PROCEDURE — 36600 WITHDRAWAL OF ARTERIAL BLOOD: CPT

## 2023-11-19 PROCEDURE — 80048 BASIC METABOLIC PNL TOTAL CA: CPT

## 2023-11-19 PROCEDURE — 94640 AIRWAY INHALATION TREATMENT: CPT

## 2023-11-19 PROCEDURE — 85027 COMPLETE CBC AUTOMATED: CPT

## 2023-11-19 PROCEDURE — 94150 VITAL CAPACITY TEST: CPT

## 2023-11-19 PROCEDURE — 94003 VENT MGMT INPAT SUBQ DAY: CPT

## 2023-11-19 RX ADMIN — MELATONIN 3 MG: at 21:14

## 2023-11-19 RX ADMIN — FLUCONAZOLE 400 MG: 100 TABLET ORAL at 08:06

## 2023-11-19 RX ADMIN — AMLODIPINE BESYLATE 10 MG: 5 TABLET ORAL at 08:07

## 2023-11-19 RX ADMIN — GABAPENTIN 300 MG: 300 CAPSULE ORAL at 17:00

## 2023-11-19 RX ADMIN — NICOTINE 7 MG: 7 PATCH, EXTENDED RELEASE TRANSDERMAL at 08:06

## 2023-11-19 RX ADMIN — BUDESONIDE 0.5 MG: 0.5 INHALANT ORAL at 19:26

## 2023-11-19 RX ADMIN — FORMOTEROL FUMARATE DIHYDRATE 20 MCG: 20 SOLUTION RESPIRATORY (INHALATION) at 19:27

## 2023-11-19 RX ADMIN — SERTRALINE 75 MG: 25 TABLET, FILM COATED ORAL at 08:06

## 2023-11-19 RX ADMIN — IPRATROPIUM BROMIDE 0.5 MG: 0.5 SOLUTION RESPIRATORY (INHALATION) at 13:57

## 2023-11-19 RX ADMIN — SODIUM CHLORIDE SOLN NEBU 3% 4 ML: 3 NEBU SOLN at 13:57

## 2023-11-19 RX ADMIN — LEVALBUTEROL HYDROCHLORIDE 1.25 MG: 1.25 SOLUTION RESPIRATORY (INHALATION) at 13:57

## 2023-11-19 RX ADMIN — GABAPENTIN 300 MG: 300 CAPSULE ORAL at 08:07

## 2023-11-19 RX ADMIN — BUSPIRONE HYDROCHLORIDE 30 MG: 10 TABLET ORAL at 17:00

## 2023-11-19 RX ADMIN — FAMOTIDINE 20 MG: 20 TABLET, FILM COATED ORAL at 08:06

## 2023-11-19 RX ADMIN — IPRATROPIUM BROMIDE 0.5 MG: 0.5 SOLUTION RESPIRATORY (INHALATION) at 02:16

## 2023-11-19 RX ADMIN — QUETIAPINE FUMARATE 50 MG: 25 TABLET ORAL at 21:15

## 2023-11-19 RX ADMIN — CHLORHEXIDINE GLUCONATE 15 ML: 1.2 SOLUTION ORAL at 08:07

## 2023-11-19 RX ADMIN — FAMOTIDINE 20 MG: 20 TABLET, FILM COATED ORAL at 18:05

## 2023-11-19 RX ADMIN — FORMOTEROL FUMARATE DIHYDRATE 20 MCG: 20 SOLUTION RESPIRATORY (INHALATION) at 09:20

## 2023-11-19 RX ADMIN — LEVALBUTEROL HYDROCHLORIDE 1.25 MG: 1.25 SOLUTION RESPIRATORY (INHALATION) at 19:26

## 2023-11-19 RX ADMIN — BUSPIRONE HYDROCHLORIDE 30 MG: 10 TABLET ORAL at 08:06

## 2023-11-19 RX ADMIN — SODIUM CHLORIDE SOLN NEBU 3% 4 ML: 3 NEBU SOLN at 19:27

## 2023-11-19 RX ADMIN — ACYCLOVIR 400 MG: 200 CAPSULE ORAL at 18:00

## 2023-11-19 RX ADMIN — BUSPIRONE HYDROCHLORIDE 30 MG: 10 TABLET ORAL at 21:16

## 2023-11-19 RX ADMIN — FOLIC ACID 1 MG: 1 TABLET ORAL at 08:05

## 2023-11-19 RX ADMIN — ACYCLOVIR 400 MG: 200 CAPSULE ORAL at 08:06

## 2023-11-19 RX ADMIN — OXYCODONE HYDROCHLORIDE 5 MG: 5 SOLUTION ORAL at 12:00

## 2023-11-19 RX ADMIN — GABAPENTIN 300 MG: 300 CAPSULE ORAL at 21:15

## 2023-11-19 RX ADMIN — APIXABAN 10 MG: 5 TABLET, FILM COATED ORAL at 21:15

## 2023-11-19 RX ADMIN — APIXABAN 10 MG: 5 TABLET, FILM COATED ORAL at 08:06

## 2023-11-19 RX ADMIN — LEVALBUTEROL HYDROCHLORIDE 1.25 MG: 1.25 SOLUTION RESPIRATORY (INHALATION) at 09:19

## 2023-11-19 RX ADMIN — CHLORHEXIDINE GLUCONATE 15 ML: 1.2 SOLUTION ORAL at 21:15

## 2023-11-19 RX ADMIN — IPRATROPIUM BROMIDE 0.5 MG: 0.5 SOLUTION RESPIRATORY (INHALATION) at 09:19

## 2023-11-19 RX ADMIN — OXYCODONE HYDROCHLORIDE 5 MG: 5 SOLUTION ORAL at 21:17

## 2023-11-19 RX ADMIN — IPRATROPIUM BROMIDE 0.5 MG: 0.5 SOLUTION RESPIRATORY (INHALATION) at 19:27

## 2023-11-19 RX ADMIN — LEVALBUTEROL HYDROCHLORIDE 1.25 MG: 1.25 SOLUTION RESPIRATORY (INHALATION) at 02:16

## 2023-11-19 RX ADMIN — OXYCODONE HYDROCHLORIDE 2.5 MG: 5 SOLUTION ORAL at 08:07

## 2023-11-19 RX ADMIN — SODIUM CHLORIDE SOLN NEBU 3% 4 ML: 3 NEBU SOLN at 09:20

## 2023-11-19 RX ADMIN — METOPROLOL TARTRATE 25 MG: 25 TABLET, FILM COATED ORAL at 21:15

## 2023-11-19 RX ADMIN — BUDESONIDE 0.5 MG: 0.5 INHALANT ORAL at 09:19

## 2023-11-19 RX ADMIN — METOPROLOL TARTRATE 25 MG: 25 TABLET, FILM COATED ORAL at 08:05

## 2023-11-19 NOTE — PLAN OF CARE
Problem: MOBILITY - ADULT  Goal: Maintain or return to baseline ADL function  Description: INTERVENTIONS:  -  Assess patient's ability to carry out ADLs; assess patient's baseline for ADL function and identify physical deficits which impact ability to perform ADLs (bathing, care of mouth/teeth, toileting, grooming, dressing, etc.)  - Assess/evaluate cause of self-care deficits   - Assess range of motion  - Assess patient's mobility; develop plan if impaired  - Assess patient's need for assistive devices and provide as appropriate  - Encourage maximum independence but intervene and supervise when necessary  - Involve family in performance of ADLs  - Assess for home care needs following discharge   - Consider OT consult to assist with ADL evaluation and planning for discharge  - Provide patient education as appropriate  Outcome: Progressing     Problem: Prexisting or High Potential for Compromised Skin Integrity  Goal: Skin integrity is maintained or improved  Description: INTERVENTIONS:  - Identify patients at risk for skin breakdown  - Assess and monitor skin integrity  - Assess and monitor nutrition and hydration status  - Monitor labs   - Assess for incontinence   - Turn and reposition patient  - Assist with mobility/ambulation  - Relieve pressure over bony prominences  - Avoid friction and shearing  - Provide appropriate hygiene as needed including keeping skin clean and dry  - Evaluate need for skin moisturizer/barrier cream  - Collaborate with interdisciplinary team   - Patient/family teaching  - Consider wound care consult   Outcome: Progressing     Problem: Nutrition/Hydration-ADULT  Goal: Nutrient/Hydration intake appropriate for improving, restoring or maintaining nutritional needs  Description: Monitor and assess patient's nutrition/hydration status for malnutrition. Collaborate with interdisciplinary team and initiate plan and interventions as ordered.   Monitor patient's weight and dietary intake as ordered or per policy. Utilize nutrition screening tool and intervene as necessary. Determine patient's food preferences and provide high-protein, high-caloric foods as appropriate.      INTERVENTIONS:  - Monitor oral intake, urinary output, labs, and treatment plans  - Assess nutrition and hydration status and recommend course of action  - Evaluate amount of meals eaten  - Assist patient with eating if necessary   - Allow adequate time for meals  - Recommend/ encourage appropriate diets, oral nutritional supplements, and vitamin/mineral supplements  - Order, calculate, and assess calorie counts as needed  - Recommend, monitor, and adjust tube feedings and TPN/PPN based on assessed needs  - Assess need for intravenous fluids  - Provide specific nutrition/hydration education as appropriate  - Include patient/family/caregiver in decisions related to nutrition  Outcome: Progressing     Problem: PAIN - ADULT  Goal: Verbalizes/displays adequate comfort level or baseline comfort level  Description: Interventions:  - Encourage patient to monitor pain and request assistance  - Assess pain using appropriate pain scale  - Administer analgesics based on type and severity of pain and evaluate response  - Implement non-pharmacological measures as appropriate and evaluate response  - Consider cultural and social influences on pain and pain management  - Notify physician/advanced practitioner if interventions unsuccessful or patient reports new pain  Outcome: Progressing     Problem: INFECTION - ADULT  Goal: Absence or prevention of progression during hospitalization  Description: INTERVENTIONS:  - Assess and monitor for signs and symptoms of infection  - Monitor lab/diagnostic results  - Monitor all insertion sites, i.e. indwelling lines, tubes, and drains  - Monitor endotracheal if appropriate and nasal secretions for changes in amount and color  - [place] appropriate cooling/warming therapies per order  - Administer medications as ordered  - Instruct and encourage patient and family to use good hand hygiene technique  - Identify and instruct in appropriate isolation precautions for identified infection/condition  Outcome: Progressing     Problem: SAFETY ADULT  Goal: Maintain or return to baseline ADL function  Description: INTERVENTIONS:  -  Assess patient's ability to carry out ADLs; assess patient's baseline for ADL function and identify physical deficits which impact ability to perform ADLs (bathing, care of mouth/teeth, toileting, grooming, dressing, etc.)  - Assess/evaluate cause of self-care deficits   - Assess range of motion  - Assess patient's mobility; develop plan if impaired  - Assess patient's need for assistive devices and provide as appropriate  - Encourage maximum independence but intervene and supervise when necessary  - Involve family in performance of ADLs  - Assess for home care needs following discharge   - Consider OT consult to assist with ADL evaluation and planning for discharge  - Provide patient education as appropriate  Outcome: Progressing     Problem: DISCHARGE PLANNING  Goal: Discharge to home or other facility with appropriate resources  Description: INTERVENTIONS:  - Identify barriers to discharge w/patient and caregiver  - Arrange for needed discharge resources and transportation as appropriate  - Identify discharge learning needs (meds, wound care, etc.)  - Arrange for interpretive services to assist at discharge as needed  - Refer to Case Management Department for coordinating discharge planning if the patient needs post-hospital services based on physician/advanced practitioner order or complex needs related to functional status, cognitive ability, or social support system  Outcome: Progressing     Problem: Knowledge Deficit  Goal: Patient/family/caregiver demonstrates understanding of disease process, treatment plan, medications, and discharge instructions  Description: Complete learning assessment and assess knowledge base.   Interventions:  - Provide teaching at level of understanding  - Provide teaching via preferred learning methods  Outcome: Progressing

## 2023-11-19 NOTE — ASSESSMENT & PLAN NOTE
POA in [place] ([place]) ED: Swollen tongue, soft and hard palate with severe angioedema. Decadron 10 mg, Benadryl 25 mg given. 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     Plan:  Cuffless trach 9/17, cuffed [place] XLT #7 10/3  See acute respiratory failure and oropharyngeal mass above

## 2023-11-19 NOTE — ASSESSMENT & PLAN NOTE
Malnutrition Findings:   Adult Malnutrition type: Chronic illness  Adult Degree of Malnutrition: Unspecified protein calorie malnutrition  Malnutrition Characteristics: Inadequate energy, Other (comment) (stress of illness)                  360 Statement: Protein calorie malnutrition r/t inadequate intake as evidenced by >7 day period of poor intakes and stress of infection; currently treated with regular diet and nutrition supplements    BMI Findings: Body mass index is 32.17 kg/m².

## 2023-11-19 NOTE — ASSESSMENT & PLAN NOTE
Large B-cell lymphoma, stage II. First chemo cycle 9/22 4 days of R-EPOCH. 9/27 Rituxan. 9/28 Filgrastim. Received nivestym 300 mc 7 days dcd on 10/26      Prophylaxis:  Bactrim prophylaxis Monday Wednesday Friday,  Acyclovir 400 mg twice daily  Fluconazole 200 mg daily  Levofloxacin 250 mg daily  Lovenox 40 mg daily    9/22  4 days of R-EPOCH.  9/27 Rituxan  10/13 for [facility] cycle 2, which was done over 4 days and cytoxan on 10/19.  11/6 completed R-EPOCH cycle 3 and Cytoxan 11/7. Plan:  CT neck w wo contrast 11/15 (showed IJ thrombus as above)  Oncology is following. Next chemotherapy cycle will be planned after the size of the mass is assessed. Plan for repeat PET-CT upon hospital discharge to evaluate disease response. Transfuse blood products as needed.   Keep Hgb>7 and Plt>20 for chemo   See acute respiratory failure above

## 2023-11-19 NOTE — WOUND OSTOMY CARE
Progress Note - Wound   Paulina Lundberg 77 y.o. female MRN: [id number]  Unit/Bed#: ICU 03 Encounter: [id number]      Assessment:   Patient seen today for wound care follow up assessment. Patient is incontinent of bowel and bladder. Patient is max assist with turning from side to side for assessment. Patient is independent with meals. Assessment Findings:   Sacral/buttocks and B/L heels intact and blanching, preventative skin care orders placed. 1. Healing stage III under trach- wound continues to improve, significantly decreased in size, appears partial thickness with 100% pink/yellow granular tissue, periwound skin is intact, scant amount of serosanguineous drainage. No induration, fluctuance, odor, warmth/temperature differences, redness, or purulence noted to the above noted wounds and skin areas assessed. New dressings applied per orders listed below. Patient tolerated well- no s/s of non-verbal pain or discomfort observed during the encounter. Bedside nurse aware of plan of care. See flow sheets for more detailed assessment findings. Wound care will continue to follow. Skin care plans:  1-Hydraguard to bilateral sacrum, buttock and heels BID and PRN  2-Elevate heels to offload pressure. 3-Ehob cushion in chair when out of bed. 4-Moisturize skin daily with skin nourishing cream.  5-Turn/reposition q2h or when medically stable for pressure re-distribution on skin. 6-Cleanse neck wound with normal saline, apply maxorb to wound, cover with split gauze, change daily and PRN soilage/displacement.     Wound 09/21/23 Pressure Injury Throat Medial (Active)   Wound Image   11/19/23 1024   Wound Description Pink;Yellow 11/19/23 1024   Pressure Injury Stage U 11/15/23 2000   Tracy-wound Assessment Fragile 11/19/23 1024   Wound Length (cm) 0.5 cm 11/19/23 1024   Wound Width (cm) 0.5 cm 11/19/23 1024   Wound Depth (cm) 0.1 cm 11/19/23 1024   Wound Surface Area (cm^2) 0.25 cm^2 11/19/23 1024   Wound Volume (cm^3) 0.025 cm^3 11/19/23 1024   Calculated Wound Volume (cm^3) 0.03 cm^3 11/19/23 1024   Change in Wound Size % 85 11/19/23 1024   Tunneling 0 cm 11/19/23 1024   Tunneling in depth located at 0 11/19/23 1024   Undermining 0 11/19/23 1024   Undermining is depth extending from 0 11/19/23 1024   Wound Site Closure MICA 11/19/23 1024   Drainage Amount Scant 11/19/23 1024   Drainage Description Serosanguineous 11/19/23 1024   Non-staged Wound Description Partial thickness 11/19/23 1024   Treatments Cleansed 11/19/23 1024   Dressing Calcium Alginate;Gauze 11/19/23 1024   Wound packed? No 11/19/23 1024   Packing- # removed 0 11/19/23 1024   Packing- # inserted 0 11/19/23 1024   Dressing Changed New 11/19/23 1024   Patient Tolerance Tolerated well 11/19/23 1024   Dressing Status Clean;Dry; Intact 11/19/23 1024           Megan ARGUETAN, RN, [facility]

## 2023-11-19 NOTE — ASSESSMENT & PLAN NOTE
Recent Labs     11/17/23  0557 11/18/23  0538 11/19/23  0448   K 4.0 4.5 4.3     Workup:  Etiology: Patient takes bactrim. Tumor lysis would be less likely since calcium is elevated rather than decreased. Uric acid is normal.   K+ elevated but returned to normal after medical management. Will give another round of medical management if elevated again. Plan:  Kdur 40 meq PO. After 2 hours, potassium chloride 20 meq IV. Trend potassium on daily BMP.

## 2023-11-19 NOTE — ASSESSMENT & PLAN NOTE
Lab Results   Component Value Date    EGFR 44 11/19/2023    EGFR 35 11/18/2023    EGFR 31 11/17/2023    CREATININE 1.24 11/19/2023    CREATININE 1.50 (H) 11/18/2023    CREATININE 1.66 (H) 11/17/2023     Baseline Cr between 0.75-1.1  Initial HERIBERTO felt 2/2 prerenal azotemia 2/2 diuresis, reduced PO intake +/- ATN. Patient received 2 doses of Bumex IV 2 g as she had not been responding appropriately to IV 40 Lasix daily. Creatinine went up by 0.5 meeting criteria for an HERIBERTO. Suspect most likely due to medications as a combination of prerenal and intrinsic. FENa was 0.2%, UA shows no casts or signs of infection, urine eosinophils 0%. Patient likely has prerenal HERIBERTO from volume depletion. Received 2 L NS and 1 pRBC and cr went up by 0.07 still and Na and Cl went down, suspect that volume prevented further cr rise and free water excretion impaired by kidney function, allowing hyponatremia to increase. Suspect that now that nephrotoxic medications have been stopped she responded well to Lasix and creatinine downtrended. HERIBERTO now resolved. Will be cautious about nephrotoxins and monitor as restarting EPOCH and ppx. Patient gets volume depleted and overloaded very easily. Especially from chemo. 10/19 Heriberto as cr went up by 0.3 in 48 hours from 0.82 on 10/17 to 1.22 on 10/19. Suspect this is prerenal hypovolemic, will see if patient improves with fluids. She gets overloaded easily so if no improvement suspect CRS again. 10/29 HERIBERTO cr went up by 0.3 again. She has 922 ml UOP in last 24 hours. HERIBERTO likely from lasix 40mg IV given yesterday. She is not hypovolemic.    11/17 HERIBERTO creatinine up to 1.8. Isolyte bolus 500 given. Plan:   Creatinine downtrended to 1.66 yesterday, AM BMP pending  Continue to monitor UO/renal function. Avoid nephrotoxic agents  Continue to monitor a.m. BMP.

## 2023-11-19 NOTE — ASSESSMENT & PLAN NOTE
Wt Readings from Last 3 Encounters:   11/19/23 76.3 kg (168 lb 3.4 oz)   09/07/23 74.6 kg (164 lb 6.4 oz)   08/30/23 73.3 kg (161 lb 9.6 oz)     Lab Results   Component Value Date    LVEF 60 08/28/2023     (H) 10/28/2023     (H) 09/29/2023     Echo 8/28/23: LVEF 60%.        Plan:  K > 4   Measure I/O

## 2023-11-19 NOTE — ASSESSMENT & PLAN NOTE
Lab Results   Component Value Date    CREATININE 1.24 11/19/2023    CREATININE 1.50 (H) 11/18/2023    CREATININE 1.66 (H) 11/17/2023     Likely prerenal.  Second to poor oral intake versus poor urine output. Baseline creatinine 1 to 1.2.     Cr at baseline  Plan:  Urinary retention protocol, Daily weights, I/O  Trend Cr daily

## 2023-11-19 NOTE — ASSESSMENT & PLAN NOTE
Cuffless trach placed 9/17, transitioned to cuffed on 10/3. Oxygen requirements not improving. For mental health patient is on buspirone, quetiapine, and sertraline. For pain, gabapentin, oxycodone scheduled and prn, prn oxycodone mainly utilized for increased pain during chemo and after a bronch. On Guaifenesin, Atrovent and Xopenex for airway maintenance. No improvement in bilateral consolidation or atelectasis and groundglass opacities on repeat CT and chest x-rays. Bronchial cx with 3 colonies CoNS. CTCAP 10/12:  indicates additional groundglass opacity with paraseptal emphysema, which may be contributing to inability to remain off vent. Bronchoscopy performed and unremarkable. Samples sent to lab for culture. Patient was placed back on vent s/p procedure. Now on high flow. Completed 7 day course of cefepime and Vancomycin. Bronchial culture and gram stain negative. Micro negative for CMV, AFB, Fungitell, Pneumocystis jiroveci (carinii), Histoplasma, Aspergillus. Trach secretions sent for sputum culture shows 2+ polys, 1+ gram-positive cocci in pairs, chains and clusters and 1+ gram-negative rods. 11/14 Discussion regarding possibilities of discharge done with family. Family shows understanding of patient's condition. 11/15 Trach is capped and patient is placed on 3 L of NC briefly for 2 days. Patient is back on trach collar at high flow and NC. Cuffless trach was placed 9/17. Replaced with a cuffed Shiley XLT #7 10/3    Plan:  Continue oxygen via NC and trial trach collar downtitration. Suctioning via trach if patient is desatting and unable to expectorate phlegm. Likely Chemo induced pneumonitis. Continue inhalation treatment with Pulmicort and formoterol q12 , duonebs QID. Regular diet  Daily PT/OT  Discussion with family and CM that patient will go home with oxygen and would need to be admitted to the hospital for subsequent chemotherapy sessions.  Heme/oncology will f/u with patient as she required PET-CT scan after discharge to further assess size of the mass for chemotherapy sessions.

## 2023-11-19 NOTE — ASSESSMENT & PLAN NOTE
Recent Labs     11/17/23  1453 11/18/23  0538 11/19/23  0448   HGB 8.6* 8.2* 8.5*       '  Patient received 1 PRBC transfusion on 10/5 and 10/25. Her B/L hemoglobin is 12-14. No sites of bleeding, no black stools. Iron panel indicative of inflammatory anemia. Normal Vitamin B12 levels. Folate low at 5.5. Filgrastim 4 doses administered by heme/onc. Plan:  Trend hemoglobin and transfuse for <7. As per heme/onc transfuse irradiated and leukoreduced blood products. Trend platelets  Folic acid 1mg daily supplementation  As per conversation with Heme/oncology attending, patient pancytopenia associated with chemotherapy is expected/predictable. No further anemia w/u required.   Low ANC management as per heme/onc

## 2023-11-19 NOTE — PROGRESS NOTES
[facility]  Progress Note  Name: Radha Garcia  MRN: [id number]  Unit/Bed#: ICU 03 I Date of Admission: 9/13/2023   Date of Service: 11/19/2023 I Hospital Day: 79    Assessment/Plan   * Acute respiratory failure with hypoxia secondary to oropharyngeal mass  Assessment & Plan  Cuffless trach placed 9/17, transitioned to cuffed on 10/3. Oxygen requirements not improving. For mental health patient is on buspirone, quetiapine, and sertraline. For pain, gabapentin, oxycodone scheduled and prn, prn oxycodone mainly utilized for increased pain during chemo and after a bronch. On Guaifenesin, Atrovent and Xopenex for airway maintenance. No improvement in bilateral consolidation or atelectasis and groundglass opacities on repeat CT and chest x-rays. Bronchial cx with 3 colonies CoNS. CTCAP 10/12:  indicates additional groundglass opacity with paraseptal emphysema, which may be contributing to inability to remain off vent. Bronchoscopy performed and unremarkable. Samples sent to lab for culture. Patient was placed back on vent s/p procedure. Now on high flow. Completed 7 day course of cefepime and Vancomycin. Bronchial culture and gram stain negative. Micro negative for CMV, AFB, Fungitell, Pneumocystis jiroveci (carinii), Histoplasma, Aspergillus. Trach secretions sent for sputum culture shows 2+ polys, 1+ gram-positive cocci in pairs, chains and clusters and 1+ gram-negative rods. 11/14 Discussion regarding possibilities of discharge done with family. Family shows understanding of patient's condition. 11/15 Trach is capped and patient is placed on 3 L of NC briefly for 2 days. Patient is back on trach collar at high flow and NC. Cuffless trach was placed 9/17. Replaced with a cuffed Shiley XLT #7 10/3    Plan:  Continue oxygen via NC and trial trach collar downtitration. Suctioning via trach if patient is desatting and unable to expectorate phlegm.   Likely Chemo induced pneumonitis. Continue inhalation treatment with Pulmicort and formoterol q12 , duonebs QID. Regular diet  Daily PT/OT  Discussion with family and CM that patient will go home with oxygen and would need to be admitted to the hospital for subsequent chemotherapy sessions. Heme/oncology will f/u with patient as she required PET-CT scan after discharge to further assess size of the mass for chemotherapy sessions. Large cell lymphoma ([address])  Assessment & Plan  Large B-cell lymphoma, stage II. First chemo cycle 9/22 4 days of R-EPOCH. 9/27 Rituxan. 9/28 Filgrastim. Received nivestym 300 mc 7 days dcd on 10/26      Prophylaxis:  Bactrim prophylaxis Monday Wednesday Friday,  Acyclovir 400 mg twice daily  Fluconazole 200 mg daily  Levofloxacin 250 mg daily  Lovenox 40 mg daily    9/22  4 days of R-EPOCH.  9/27 Rituxan  10/13 for [facility] cycle 2, which was done over 4 days and cytoxan on 10/19.  11/6 completed R-EPOCH cycle 3 and Cytoxan 11/7. Plan:  CT neck w wo contrast 11/15 (showed IJ thrombus as above)  Oncology is following. Next chemotherapy cycle will be planned after the size of the mass is assessed. Plan for repeat PET-CT upon hospital discharge to evaluate disease response. Transfuse blood products as needed. Keep Hgb>7 and Plt>20 for chemo   See acute respiratory failure above    Acute embolism and thrombosis of right internal jugular vein (HCC)  Assessment & Plan  CT soft tissues of the neck: Nonocclusive thrombus noted within the right internal jugular vein just above the Mediport entrance site into the internal jugular vein. No signs of pain, headaches, vision changes. Plan:  Eliquis 10 mg for 7 days. Switch to Eliquis 5 mg starting 11/22. Oropharyngeal mass  Assessment & Plan  9/14 Neck/ soft tissue CT: Large enhancing soft tissue mass identified within the left neck involving multiple spaces. Centered within the left oropharynx with extensions as described above into multiple spaces.  This results in significant airway compromise. 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 Small level 3/level 4 nodes are seen within the neck. Tracheostomy by ENT. Replaced on 9/26. H/o tobacco abuse, daily smoker. On nicotine while inpatient, confirmed with patient she needs nicotine patch. CT soft tissue neck shows reduced mass effect from 9/14 to 10/13. Can consider reducing trach size if continues to improve. Patient often refusing peg tube feeds but is feeding herself small meals orally. Plan:  ENT and heme onc following  As per speech therapist diet  Dysphagia 2. Continue parallel PEG tube feeds for nutrition. See acute respiratory failure and large B cell lymphoma above. Pancytopenia due to chemotherapy [facility])  Assessment & Plan  Recent Labs     11/17/23  1453 11/18/23  0538 11/19/23  0448   HGB 8.6* 8.2* 8.5*       '  Patient received 1 PRBC transfusion on 10/5 and 10/25. Her B/L hemoglobin is 12-14. No sites of bleeding, no black stools. Iron panel indicative of inflammatory anemia. Normal Vitamin B12 levels. Folate low at 5.5. Filgrastim 4 doses administered by heme/onc. Plan:  Trend hemoglobin and transfuse for <7. As per heme/onc transfuse irradiated and leukoreduced blood products. Trend platelets  Folic acid 1mg daily supplementation  As per conversation with Heme/oncology attending, patient pancytopenia associated with chemotherapy is expected/predictable. No further anemia w/u required. Low ANC management as per heme/onc    Hyperkalemia-resolved as of 11/10/2023  Assessment & Plan  Recent Labs     11/17/23  0557 11/18/23  0538 11/19/23  0448   K 4.0 4.5 4.3     Workup:  Etiology: Patient takes bactrim. Tumor lysis would be less likely since calcium is elevated rather than decreased. Uric acid is normal.   K+ elevated but returned to normal after medical management. Will give another round of medical management if elevated again. Plan:  Kdur 40 meq PO.   After 2 hours, potassium chloride 20 meq IV. Trend potassium on daily BMP. PO (acute kidney injury) (HCC)-resolved as of 9/21/2023  Assessment & Plan  Lab Results   Component Value Date    CREATININE 1.24 11/19/2023    CREATININE 1.50 (H) 11/18/2023    CREATININE 1.66 (H) 11/17/2023     Likely prerenal.  Second to poor oral intake versus poor urine output. Baseline creatinine 1 to 1.2. Cr at baseline  Plan:  Urinary retention protocol, Daily weights, I/O  Trend Cr daily    RML pneumonia-resolved as of 9/22/2023  Assessment & Plan  9/13 CT PE study: Patchy consolidation right middle lobe, new from 8/25/2023, compatible with pneumonia. No leukocytosis, no fever/chills. Positive for sore throat, cough. New onset pneumonia after recent hospitalization and antibiotics treatment. 9/14: Bronchoscopy/ ABL. MRSA negative. ABL revealed 2+ Growth of Candida albicans, suspected contaminant. Blister (nonthermal) of left forearm, sequela  Assessment & Plan  Blisters of left upper extremity healing, no signs of infection, continue daily wound care. Generalized abdominal pain-resolved as of 11/17/2023  Assessment & Plan  Patient reports abdominal pain which is unchanged since 9/22 no guarding on exam. Takes Famotidine for GERD at home. Alk phos 10/2 145, 10/20 79. CTCAP reveals complex right upper pole renal lesion requiring possible outpatient MRI    Continue Famotidine  On bowel regimen with Senna and Miralax prn    (HFpEF) heart failure with preserved ejection fraction [facility])  Assessment & Plan  Wt Readings from Last 3 Encounters:   11/19/23 76.3 kg (168 lb 3.4 oz)   09/07/23 74.6 kg (164 lb 6.4 oz)   08/30/23 73.3 kg (161 lb 9.6 oz)     Lab Results   Component Value Date    LVEF 60 08/28/2023     (H) 10/28/2023     (H) 09/29/2023     Echo 8/28/23: LVEF 60%.        Plan:  K > 4   Measure I/O      Ambulatory dysfunction  Assessment & Plan  2/2 Impacted by chronic pain syndrome + prolonged hospital stay.  Continue OOB with PT. PT rec post acute rehab however reportedly Cannot get LTACH placement while getting chemo per CM  She is aware STR SNFs continue to follow and treatment can be done from a SNF level of care. PT would need to be on trach collar for at least 72 hours with no need for the vent. Aware that pt would need to be around 28% FiO2     Depression  Assessment & Plan  Patient on Zoloft 75 daily and Seroquel hs  Patient is depressed, multiple family/palliative discussions. patient is firm that she wants to live and to fight, she is just tired. Currently goal is disease treatment oriented. Full code. No SI/HI ideations. Palliative signed off, will reconsult if patient changes her mind regarding wishes. Chronic Superficial thrombophlebitis  Assessment & Plan  Right forearm soreness. RUE US: No acute or chronic deep vein thrombosis. 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 PO not severe enough to require renal dosing at this time, will continue to monitor and adjust dosing as needed. Continue DVT ppx with Lovenox 40    CKD (chronic kidney disease) stage 3, GFR 30-59 ml/min [facility])  Assessment & Plan  Lab Results   Component Value Date    EGFR 44 11/19/2023    EGFR 35 11/18/2023    EGFR 31 11/17/2023    CREATININE 1.24 11/19/2023    CREATININE 1.50 (H) 11/18/2023    CREATININE 1.66 (H) 11/17/2023     Baseline Cr between 0.75-1.1  Initial PO felt 2/2 prerenal azotemia 2/2 diuresis, reduced PO intake +/- ATN. Patient received 2 doses of Bumex IV 2 g as she had not been responding appropriately to IV 40 Lasix daily. Creatinine went up by 0.5 meeting criteria for an PO. Suspect most likely due to medications as a combination of prerenal and intrinsic. FENa was 0.2%, UA shows no casts or signs of infection, urine eosinophils 0%. Patient likely has prerenal PO from volume depletion.  Received 2 L NS and 1 pRBC and cr went up by 0.07 still and Na and Cl went down, suspect that volume prevented further cr rise and free water excretion impaired by kidney function, allowing hyponatremia to increase. Suspect that now that nephrotoxic medications have been stopped she responded well to Lasix and creatinine downtrended. HERIBERTO now resolved. Will be cautious about nephrotoxins and monitor as restarting EPOCH and ppx. Patient gets volume depleted and overloaded very easily. Especially from chemo. 10/19 Heriberto as cr went up by 0.3 in 48 hours from 0.82 on 10/17 to 1.22 on 10/19. Suspect this is prerenal hypovolemic, will see if patient improves with fluids. She gets overloaded easily so if no improvement suspect CRS again. 10/29 HERIBERTO cr went up by 0.3 again. She has 922 ml UOP in last 24 hours. HERIBERTO likely from lasix 40mg IV given yesterday. She is not hypovolemic.    11/17 HERIBERTO creatinine up to 1.8. Isolyte bolus 500 given. Plan:   Creatinine downtrended to 1.66 yesterday, AM BMP pending  Continue to monitor UO/renal function. Avoid nephrotoxic agents  Continue to monitor a.m. BMP. Pain syndrome, chronic  Assessment & Plan  Home regimen: Oxycodone 5 mg BID, Tylenol 650 mg q8 prn. Gabapentin 600 mg TID. Medical marijuana. Lumbar radiculopathy and impaired ambulatory dysfunction secondary to pain. Has bowel regimen and is having bowel movements daily. Patient required additional pain management during previous cycle and has some additional pain from tunneled line placement    Plan:  Continue gabapentin 300 mg TID, oxycodone 5 mg TID, prn Tylenol 650 mg q6. Oxycodone 5mg every 8 hours  Oxycodone 2.5 mg q6 PRN    Benign essential hypertension  Assessment & Plan  Home regimen Coreg 12.5 mg BID, Amlodipine 10 mg daily, and lisinopril 40 mg. All discontinued at this time secondary to hypotension and HERIBERTO since cycle 1. but stable currently without any antihypertensives. Systolic persistently elevated and tachycardic, potentially secondary to pain.  Patient was more hypotensive during last chemo. Coreg contraindicated during chemo, since cycles are every other week, would be better to stick with lopressor during duration of therapy as opposed to switching constantly. Plan:  Monitor vitals. Continue Norvasc 10 and lopressor 25 bid  Prn hydralazine for SBP > 160    Angioedema-resolved as of 11/4/2023  Assessment & Plan  POA in [place]: Swollen tongue, soft and hard palate with severe angioedema. Decadron 10 mg, Benadryl 25 mg given. 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 Plan:  Cuffless trach 9/17, cuffed Shiley XLT #7 10/3  See acute respiratory failure and oropharyngeal mass above    Hyperlipidemia-resolved as of 10/26/2023  Assessment & Plan  Lab Results   Component Value Date    CHOLESTEROL 118 10/09/2023    HDL 14 (L) 10/09/2023    TRIG 144 10/09/2023    [address] 104 10/09/2023     Continue outpatient diet and lifestyle modification. Protein-calorie malnutrition ([address])  Assessment & Plan  Malnutrition Findings:   Adult Malnutrition type: Chronic illness  Adult Degree of Malnutrition: Unspecified protein calorie malnutrition  Malnutrition Characteristics: Inadequate energy, Other (comment) (stress of illness)                  360 Statement: Protein calorie malnutrition r/t inadequate intake as evidenced by >7 day period of poor intakes and stress of infection; currently treated with regular diet and nutrition supplements    BMI Findings: Body mass index is 32.17 kg/m². COPD without exacerbation (HCC)-resolved as of 9/26/2023  Assessment & Plan  Continue with nebs. Duonebs every 6 hours. Formeterol and Pulmicort every 12 hours. Sodium chloride 3% nebs TID. Encephalopathy-resolved as of 9/21/2023  Assessment & Plan  9/19- Pt appeared to be AAO x3. Improving. ICU Core Measures     Vented Patient  VAP Bundle  VAP bundle ordered     A: Assess, Prevent, and Manage Pain Has pain been assessed? Yes  Need for changes to pain regimen?  No B: Both Spontaneous Awakening Trials (SATs) and Spontaneous Breathing Trials (SBTs) Plan to perform spontaneous awakening trial today? N/A   Plan to perform spontaneous breathing trial today? N/A   Obvious barriers to extubation? NA   C: Choice of Sedation RASS Goal: 0 Alert and Calm  Need for changes to sedation or analgesia regimen? NA   D: Delirium CAM-ICU: Negative   E: Early Mobility  Plan for early mobility? Yes   F: Family Engagement Plan for family engagement today? Yes      Antibiotic Review: patient on prophylactic antibiotics     Review of Invasive Devices:        Central access plan: Medications requiring central line      Prophylaxis:  VTE VTE covered by:  apixaban, Oral, 10 mg at 11/19/23 0806  [START ON 11/22/2023] apixaban, Oral       Stress Ulcer  covered byfamotidine (PEPCID) tablet 20 mg [[id number]]         Significant 24hr Events     24hr events: Patient currently on 15 L via mid flow cath and trach coller with 90% FiO2. She had one large bowel movement yesterday. Denies any complaints at the moment. No acute distress. Declines further interview this morning. Subjective   Review of Systems   Reason unable to perform ROS: Patient refused to speak with me. Psychiatric/Behavioral:  Positive for agitation. Objective                            Vitals I/O      Most Recent Min/Max in 24hrs   Temp 98.8 °F (37.1 °C) Temp  Min: 97.5 °F (36.4 °C)  Max: 99.1 °F (37.3 °C)   Pulse (!) 109 Pulse  Min: 12  Max: 111   Resp (!) 37 Resp  Min: 9  Max: 38   /67 BP  Min: 105/59  Max: 133/67   O2 Sat 97 % SpO2  Min: 83 %  Max: 100 %      Intake/Output Summary (Last 24 hours) at 11/19/2023 0845  Last data filed at 11/19/2023 4629  Gross per 24 hour   Intake 50 ml   Output 425 ml   Net -375 ml       Diet Regular; Regular House    Invasive Monitoring           Physical Exam   Physical Exam  Vitals and nursing note reviewed. Eyes:      Extraocular Movements: Extraocular movements intact. Pupils: Pupils are equal, round, and reactive to light. HENT:      Head: Normocephalic and atraumatic. Neck:      Trachea: Tracheostomy present. Abdominal: General: There is abdominal type feeding tube. Pulmonary:      Effort: Pulmonary effort is normal. No respiratory distress. Comments: Breathing via NC. Neurological:      Mental Status: She is alert. She is agitated.             Diagnostic Studies      EKG: NSR  Imaging: N/a      Medications:  Scheduled PRN   acyclovir, 400 mg, BID  amLODIPine, 10 mg, Daily  apixaban, 10 mg, BID  [START ON 11/22/2023] apixaban, 5 mg, BID  budesonide, 0.5 mg, Q12H  busPIRone, 30 mg, TID  chlorhexidine, 15 mL, Q12H ASHLEY  famotidine, 20 mg, BID  fluconazole, 234 mg, Daily  folic acid, 1 mg, Daily  formoterol, 20 mcg, Q12H  gabapentin, 300 mg, TID  levalbuterol, 1.25 mg, Q6H   And  ipratropium, 0.5 mg, Q6H  melatonin, 3 mg, HS  metoprolol tartrate, 25 mg, Q12H [address]  nicotine, 7 mg, Daily  oxyCODONE, 5 mg, Q8H  polyethylene glycol, 17 g, Daily  QUEtiapine, 50 mg, HS  senna, 17.6 mg, BID  sertraline, 75 mg, Daily  sodium chloride, 4 mL, TID  sulfamethoxazole-trimethoprim, 1 tablet, Once per day on Mon Wed Fri      alteplase, 2 mg, Q1MIN PRN  alteplase, 2 mg, Q1MIN PRN  alteplase, 2 mg, Q1MIN PRN  ondansetron, 4 mg, Q8H PRN  oxyCODONE, 2.5 mg, Q6H PRN       Continuous          Labs:    CBC    Recent Labs     11/18/23  0538 11/19/23  0448   WBC 8.35 9.40   HGB 8.2* 8.5*   HCT 26.3* 27.5*    180     BMP    Recent Labs     11/18/23  0538 11/19/23  0448   SODIUM 145 143   K 4.5 4.3    107   CO2 31 30   AGAP 6 6   BUN 21 19   CREATININE 1.50* 1.24   CALCIUM 9.6 9.8       Coags    No recent results     Additional Electrolytes  No recent results       Blood Gas    No recent results  Recent Labs     11/17/23  1000   PHVEN 7.321   [id number] 59.6*   PO2VEN 32.4*   ILI6EMC 30.1*   BEVEN 3.3   [id number] 54.7*    LFTs  No recent results    Infectious  No recent results Glucose  Recent Labs     11/18/23  0538 11/19/23  0448   GLUC 105 128                 Megan Cardenas MD

## 2023-11-20 ENCOUNTER — APPOINTMENT (INPATIENT)
Dept: RADIOLOGY | Facility: HOSPITAL | Age: 68
DRG: 004 | End: 2023-11-20
Payer: COMMERCIAL

## 2023-11-20 LAB
FUNGUS SPEC CULT: NORMAL
FUNGUS SPEC CULT: NORMAL

## 2023-11-20 PROCEDURE — NC001 PR NO CHARGE: Performed by: INTERNAL MEDICINE

## 2023-11-20 PROCEDURE — 99232 SBSQ HOSP IP/OBS MODERATE 35: CPT | Performed by: INTERNAL MEDICINE

## 2023-11-20 PROCEDURE — 94640 AIRWAY INHALATION TREATMENT: CPT

## 2023-11-20 PROCEDURE — 94760 N-INVAS EAR/PLS OXIMETRY 1: CPT

## 2023-11-20 PROCEDURE — 71045 X-RAY EXAM CHEST 1 VIEW: CPT

## 2023-11-20 PROCEDURE — 94150 VITAL CAPACITY TEST: CPT

## 2023-11-20 PROCEDURE — 97530 THERAPEUTIC ACTIVITIES: CPT

## 2023-11-20 PROCEDURE — 94003 VENT MGMT INPAT SUBQ DAY: CPT

## 2023-11-20 RX ADMIN — BUSPIRONE HYDROCHLORIDE 30 MG: 10 TABLET ORAL at 18:12

## 2023-11-20 RX ADMIN — GABAPENTIN 300 MG: 300 CAPSULE ORAL at 18:12

## 2023-11-20 RX ADMIN — CHLORHEXIDINE GLUCONATE 15 ML: 1.2 SOLUTION ORAL at 09:30

## 2023-11-20 RX ADMIN — ACYCLOVIR 400 MG: 200 CAPSULE ORAL at 10:33

## 2023-11-20 RX ADMIN — LEVALBUTEROL HYDROCHLORIDE 1.25 MG: 1.25 SOLUTION RESPIRATORY (INHALATION) at 13:53

## 2023-11-20 RX ADMIN — CHLORHEXIDINE GLUCONATE 15 ML: 1.2 SOLUTION ORAL at 21:16

## 2023-11-20 RX ADMIN — BUSPIRONE HYDROCHLORIDE 30 MG: 10 TABLET ORAL at 21:16

## 2023-11-20 RX ADMIN — FOLIC ACID 1 MG: 1 TABLET ORAL at 09:29

## 2023-11-20 RX ADMIN — LEVALBUTEROL HYDROCHLORIDE 1.25 MG: 1.25 SOLUTION RESPIRATORY (INHALATION) at 21:52

## 2023-11-20 RX ADMIN — METOPROLOL TARTRATE 25 MG: 25 TABLET, FILM COATED ORAL at 09:29

## 2023-11-20 RX ADMIN — FLUCONAZOLE 400 MG: 100 TABLET ORAL at 09:30

## 2023-11-20 RX ADMIN — SODIUM CHLORIDE SOLN NEBU 3% 4 ML: 3 NEBU SOLN at 07:14

## 2023-11-20 RX ADMIN — APIXABAN 10 MG: 5 TABLET, FILM COATED ORAL at 21:12

## 2023-11-20 RX ADMIN — BUSPIRONE HYDROCHLORIDE 30 MG: 10 TABLET ORAL at 09:29

## 2023-11-20 RX ADMIN — SODIUM CHLORIDE SOLN NEBU 3% 4 ML: 3 NEBU SOLN at 13:53

## 2023-11-20 RX ADMIN — SULFAMETHOXAZOLE AND TRIMETHOPRIM 1 TABLET: 800; 160 TABLET ORAL at 09:30

## 2023-11-20 RX ADMIN — OXYCODONE HYDROCHLORIDE 2.5 MG: 5 SOLUTION ORAL at 09:31

## 2023-11-20 RX ADMIN — OXYCODONE HYDROCHLORIDE 5 MG: 5 SOLUTION ORAL at 03:58

## 2023-11-20 RX ADMIN — IPRATROPIUM BROMIDE 0.5 MG: 0.5 SOLUTION RESPIRATORY (INHALATION) at 01:03

## 2023-11-20 RX ADMIN — FAMOTIDINE 20 MG: 20 TABLET, FILM COATED ORAL at 18:12

## 2023-11-20 RX ADMIN — OXYCODONE HYDROCHLORIDE 5 MG: 5 SOLUTION ORAL at 21:12

## 2023-11-20 RX ADMIN — LEVALBUTEROL HYDROCHLORIDE 1.25 MG: 1.25 SOLUTION RESPIRATORY (INHALATION) at 01:03

## 2023-11-20 RX ADMIN — IPRATROPIUM BROMIDE 0.5 MG: 0.5 SOLUTION RESPIRATORY (INHALATION) at 07:14

## 2023-11-20 RX ADMIN — BUDESONIDE 0.5 MG: 0.5 INHALANT ORAL at 21:51

## 2023-11-20 RX ADMIN — QUETIAPINE FUMARATE 50 MG: 25 TABLET ORAL at 21:13

## 2023-11-20 RX ADMIN — GABAPENTIN 300 MG: 300 CAPSULE ORAL at 09:30

## 2023-11-20 RX ADMIN — SERTRALINE 75 MG: 25 TABLET, FILM COATED ORAL at 09:29

## 2023-11-20 RX ADMIN — GABAPENTIN 300 MG: 300 CAPSULE ORAL at 21:12

## 2023-11-20 RX ADMIN — MELATONIN 3 MG: at 21:13

## 2023-11-20 RX ADMIN — FORMOTEROL FUMARATE DIHYDRATE 20 MCG: 20 SOLUTION RESPIRATORY (INHALATION) at 07:42

## 2023-11-20 RX ADMIN — LEVALBUTEROL HYDROCHLORIDE 1.25 MG: 1.25 SOLUTION RESPIRATORY (INHALATION) at 07:14

## 2023-11-20 RX ADMIN — IPRATROPIUM BROMIDE 0.5 MG: 0.5 SOLUTION RESPIRATORY (INHALATION) at 21:52

## 2023-11-20 RX ADMIN — FAMOTIDINE 20 MG: 20 TABLET, FILM COATED ORAL at 09:29

## 2023-11-20 RX ADMIN — BUDESONIDE 0.5 MG: 0.5 INHALANT ORAL at 07:14

## 2023-11-20 RX ADMIN — OXYCODONE HYDROCHLORIDE 5 MG: 5 SOLUTION ORAL at 13:44

## 2023-11-20 RX ADMIN — APIXABAN 10 MG: 5 TABLET, FILM COATED ORAL at 09:29

## 2023-11-20 RX ADMIN — AMLODIPINE BESYLATE 10 MG: 5 TABLET ORAL at 09:29

## 2023-11-20 RX ADMIN — SODIUM CHLORIDE SOLN NEBU 3% 4 ML: 3 NEBU SOLN at 21:52

## 2023-11-20 RX ADMIN — IPRATROPIUM BROMIDE 0.5 MG: 0.5 SOLUTION RESPIRATORY (INHALATION) at 13:53

## 2023-11-20 RX ADMIN — NICOTINE 7 MG: 7 PATCH, EXTENDED RELEASE TRANSDERMAL at 09:30

## 2023-11-20 RX ADMIN — FORMOTEROL FUMARATE DIHYDRATE 20 MCG: 20 SOLUTION RESPIRATORY (INHALATION) at 21:51

## 2023-11-20 NOTE — ASSESSMENT & PLAN NOTE
Home regimen Coreg 12.5 mg BID, Amlodipine 10 mg daily, and lisinopril 40 mg. All discontinued at this time secondary to hypotension and PO since cycle 1. but stable currently without any antihypertensives. Systolic persistently elevated and tachycardic, potentially secondary to pain. Patient was more hypotensive during last chemo. Coreg contraindicated during chemo, since cycles are every other week, would be better to stick with lopressor during duration of therapy as opposed to switching constantly. Plan:  Monitor vitals.   Continue Norvasc 10 and lopressor 25 bid

## 2023-11-20 NOTE — CASE MANAGEMENT
Case Management Progress Note    Patient name Joseph Mcgarry  Location ICU 03/ICU 03 MRN [id number]  : 1955 Date 2023       LOS (days): 76  Geometric Mean LOS (GMLOS) (days): 26.10  Days to GMLOS:-41.8        OBJECTIVE:        Current admission status: Inpatient  Preferred Pharmacy:   [id number] [place] [place] - [address]. [address] [facility] [address]  Phone: [phone] Fax: [address] [facility] [address] [address]  Phone: [phone] Fax: [phone]    Primary Care Provider: Soha Stephen MD    Primary Insurance: [employer] RE[employer]  Secondary Insurance: [facility]    PROGRESS NOTE:    CM spoke with Alisa Cardenas - RT with [facility] [employer]. As per Alisa Cardenas if pt were to return home with a VENT that this would require a script, home inspection, and home training. As per Alisa Guerrar it would be required pt has 24/7 help at home from family. They would need a designated person to learn the VENT with a required back up person. Alisa Cardenas reports once  a script is obtained the process for home training would begin. Alisa Cardenas does report the vents are portable if needed during the day. As per Alisa Guerrar the Vent can only manage 50% FiO2 (max). Alisa Guerrar available to speak with the [address] [place] [address] team if needed for more information.

## 2023-11-20 NOTE — CASE MANAGEMENT
Case Management Progress Note    Patient name Claire Ray  Location ICU 03/ICU 03 MRN [id number]  : 1955 Date 2023       LOS (days): 76  Geometric Mean LOS (GMLOS) (days): 26.10  Days to GMLOS:-41.7        OBJECTIVE:        Current admission status: Inpatient  Preferred Pharmacy:   [id number] [place] - [address]. [address] [place] [address]  Phone: [phone] Fax: [phone] [facility], [place] - [address]  [address] [facility] [address]  Phone: [phone] Fax: [phone]    Primary Care Provider: Christian Bowie MD    Primary Insurance: [place]  Secondary Insurance: [facility]    PROGRESS NOTE:    CM rounded with CC team. Discussed LTACH vs return home with Vent. CM spoke with pt at bedside. Pt reports her daughter, Cipriano Kingston, would be in after work today. Pt inquiring what CM would like to discuss. CM re-presented LTACH. Discussed potential option given pt recent O2 needs and Vent at night. CM discussed process - sending referral and seeing if insurance would be in-network. Pt at this time would not like LTACH and goal is to return home with family support and supplies needed at home. Pt aware CM available for questions. Aware CM will also talk with her daughter, Cipriano Kingston. CM spoke with Cipriano Kingston on the phone. CM re-introduced LTACH as an option for pt. At this time Linnette's goals continue to align with the pt. Goal is for pt to return home. Linnette aware that if the pt or herself have any questions or decide they would like CM to send referral to [facility], [facility] to see if this would be an option for pt that CM is available to do this. SHIRA did inform Linnette that  LTACH is the only local LTACH. Linnette aware CM will continue working with the medical team on a dc plan to home. CM met with pt and she is aware CM spoke with her daughter.  She is aware that their goals continue to align and that if at any point they have additional questions or would like CM to send referral to [facility], [facility] that CM can do so. CM left VM for José Miguel - RT with [facility] to inquire about a vent set up at home. Awaiting a return call.

## 2023-11-20 NOTE — PLAN OF CARE
7.10 on 1/11/2018 ordered by Dr Guido Joshi. Problem: MOBILITY - ADULT  Goal: Maintain or return to baseline ADL function  Description: INTERVENTIONS:  -  Assess patient's ability to carry out ADLs; assess patient's baseline for ADL function and identify physical deficits which impact ability to perform ADLs (bathing, care of mouth/teeth, toileting, grooming, dressing, etc.)  - Assess/evaluate cause of self-care deficits   - Assess range of motion  - Assess patient's mobility; develop plan if impaired  - Assess patient's need for assistive devices and provide as appropriate  - Encourage maximum independence but intervene and supervise when necessary  - Involve family in performance of ADLs  - Assess for home care needs following discharge   - Consider OT consult to assist with ADL evaluation and planning for discharge  - Provide patient education as appropriate  11/19/2023 2357 by Charity Bland RN  Outcome: Progressing  11/19/2023 2357 by Charity Bland RN  Outcome: Progressing     Problem: Prexisting or High Potential for Compromised Skin Integrity  Goal: Skin integrity is maintained or improved  Description: INTERVENTIONS:  - Identify patients at risk for skin breakdown  - Assess and monitor skin integrity  - Assess and monitor nutrition and hydration status  - Monitor labs   - Assess for incontinence   - Turn and reposition patient  - Assist with mobility/ambulation  - Relieve pressure over bony prominences  - Avoid friction and shearing  - Provide appropriate hygiene as needed including keeping skin clean and dry  - Evaluate need for skin moisturizer/barrier cream  - Collaborate with interdisciplinary team   - Patient/family teaching  - Consider wound care consult   11/19/2023 2357 by Charity Bland RN  Outcome: Progressing  11/19/2023 2357 by Charity Bland RN  Outcome: Progressing     Problem: Nutrition/Hydration-ADULT  Goal: Nutrient/Hydration intake appropriate for improving, restoring or maintaining nutritional needs  Description: Monitor and assess patient's nutrition/hydration status for malnutrition. Collaborate with interdisciplinary team and initiate plan and interventions as ordered. Monitor patient's weight and dietary intake as ordered or per policy. Utilize nutrition screening tool and intervene as necessary. Determine patient's food preferences and provide high-protein, high-caloric foods as appropriate.      INTERVENTIONS:  - Monitor oral intake, urinary output, labs, and treatment plans  - Assess nutrition and hydration status and recommend course of action  - Evaluate amount of meals eaten  - Assist patient with eating if necessary   - Allow adequate time for meals  - Recommend/ encourage appropriate diets, oral nutritional supplements, and vitamin/mineral supplements  - Order, calculate, and assess calorie counts as needed  - Recommend, monitor, and adjust tube feedings and TPN/PPN based on assessed needs  - Assess need for intravenous fluids  - Provide specific nutrition/hydration education as appropriate  - Include patient/family/caregiver in decisions related to nutrition  11/19/2023 2357 by Porfirio Kaba RN  Outcome: Progressing  11/19/2023 2357 by Porfirio Kaba RN  Outcome: Progressing     Problem: PAIN - ADULT  Goal: Verbalizes/displays adequate comfort level or baseline comfort level  Description: Interventions:  - Encourage patient to monitor pain and request assistance  - Assess pain using appropriate pain scale  - Administer analgesics based on type and severity of pain and evaluate response  - Implement non-pharmacological measures as appropriate and evaluate response  - Consider cultural and social influences on pain and pain management  - Notify physician/advanced practitioner if interventions unsuccessful or patient reports new pain  11/19/2023 2357 by Porfirio Kaba RN  Outcome: Progressing  11/19/2023 2357 by Porfirio Kaba RN  Outcome: Progressing     Problem: INFECTION - ADULT  Goal: Absence or prevention of progression during hospitalization  Description: INTERVENTIONS:  - Assess and monitor for signs and symptoms of infection  - Monitor lab/diagnostic results  - Monitor all insertion sites, i.e. indwelling lines, tubes, and drains  - Monitor endotracheal if appropriate and nasal secretions for changes in amount and color  - [place] appropriate cooling/warming therapies per order  - Administer medications as ordered  - Instruct and encourage patient and family to use good hand hygiene technique  - Identify and instruct in appropriate isolation precautions for identified infection/condition  11/19/2023 2357 by Octavia Harrell RN  Outcome: Progressing  11/19/2023 2357 by Octavia Harrell RN  Outcome: Progressing     Problem: SAFETY ADULT  Goal: Maintain or return to baseline ADL function  Description: INTERVENTIONS:  -  Assess patient's ability to carry out ADLs; assess patient's baseline for ADL function and identify physical deficits which impact ability to perform ADLs (bathing, care of mouth/teeth, toileting, grooming, dressing, etc.)  - Assess/evaluate cause of self-care deficits   - Assess range of motion  - Assess patient's mobility; develop plan if impaired  - Assess patient's need for assistive devices and provide as appropriate  - Encourage maximum independence but intervene and supervise when necessary  - Involve family in performance of ADLs  - Assess for home care needs following discharge   - Consider OT consult to assist with ADL evaluation and planning for discharge  - Provide patient education as appropriate  11/19/2023 2357 by Octavia Harrell RN  Outcome: Progressing  11/19/2023 2357 by Octavia Harrell RN  Outcome: Progressing     Problem: DISCHARGE PLANNING  Goal: Discharge to home or other facility with appropriate resources  Description: INTERVENTIONS:  - Identify barriers to discharge w/patient and caregiver  - Arrange for needed discharge resources and transportation as appropriate  - Identify discharge learning needs (meds, wound care, etc.)  - Arrange for interpretive services to assist at discharge as needed  - Refer to Case Management Department for coordinating discharge planning if the patient needs post-hospital services based on physician/advanced practitioner order or complex needs related to functional status, cognitive ability, or social support system  11/19/2023 2357 by Raven Mcgregor RN  Outcome: Progressing  11/19/2023 2357 by Raven Mcgregor RN  Outcome: Progressing     Problem: Knowledge Deficit  Goal: Patient/family/caregiver demonstrates understanding of disease process, treatment plan, medications, and discharge instructions  Description: Complete learning assessment and assess knowledge base.   Interventions:  - Provide teaching at level of understanding  - Provide teaching via preferred learning methods  11/19/2023 2357 by Raven Mcgregor RN  Outcome: Progressing  11/19/2023 2357 by Raven Mcgregor RN  Outcome: Progressing     Problem: RESPIRATORY - ADULT  Goal: Achieves optimal ventilation and oxygenation  Description: INTERVENTIONS:  - Assess for changes in respiratory status  - Assess for changes in mentation and behavior  - Position to facilitate oxygenation and minimize respiratory effort  - Oxygen administered by appropriate delivery if ordered  - Initiate smoking cessation education as indicated  - Encourage broncho-pulmonary hygiene including cough, deep breathe, Incentive Spirometry  - Assess the need for suctioning and aspirate as needed  - Assess and instruct to report SOB or any respiratory difficulty  - Respiratory Therapy support as indicated  11/19/2023 2357 by Raven Mcgregor RN  Outcome: Progressing  11/19/2023 2357 by Raven Mcgregor RN  Outcome: Progressing     Problem: GENITOURINARY - ADULT  Goal: Maintains or returns to baseline urinary function  Description: INTERVENTIONS:  - Assess urinary function  - Encourage oral fluids to ensure adequate hydration if ordered  - Administer IV fluids as ordered to ensure adequate hydration  - Administer ordered medications as needed  - Offer frequent toileting  - Follow urinary retention protocol if ordered  11/19/2023 2357 by Piedad Bowen RN  Outcome: Progressing  11/19/2023 2357 by Piedad Bowen RN  Outcome: Progressing     Problem: METABOLIC, FLUID AND ELECTROLYTES - ADULT  Goal: Electrolytes maintained within normal limits  Description: INTERVENTIONS:  - Monitor labs and assess patient for signs and symptoms of electrolyte imbalances  - Administer electrolyte replacement as ordered  - Monitor response to electrolyte replacements, including repeat lab results as appropriate  - Instruct patient on fluid and nutrition as appropriate  11/19/2023 2357 by Piedad Bowen RN  Outcome: Progressing  11/19/2023 2357 by Piedad Bowen RN  Outcome: Progressing

## 2023-11-20 NOTE — PROGRESS NOTES
[id number] [facility]  Progress Note  Name: Anjana Colon  MRN: [id number]  Unit/Bed#: ICU 03 I Date of Admission: 9/13/2023   Date of Service: 11/20/2023 I Hospital Day: 76    Assessment/Plan   * Acute respiratory failure with hypoxia secondary to oropharyngeal mass  Assessment & Plan  Cuffless trach placed 9/17, transitioned to cuffed on 10/3. Oxygen requirements not improving. For mental health patient is on buspirone, quetiapine, and sertraline. For pain, gabapentin, oxycodone scheduled and prn, prn oxycodone mainly utilized for increased pain during chemo and after a bronch. On Guaifenesin, Atrovent and Xopenex for airway maintenance. No improvement in bilateral consolidation or atelectasis and groundglass opacities on repeat CT and chest x-rays. Bronchial cx with 3 colonies CoNS. CTCAP 10/12:  indicates additional groundglass opacity with paraseptal emphysema, which may be contributing to inability to remain off vent. Bronchoscopy performed and unremarkable. Samples sent to lab for culture. Patient was placed back on vent s/p procedure. Now on high flow. Completed 7 day course of cefepime and Vancomycin. Bronchial culture and gram stain negative. Micro negative for CMV, AFB, Fungitell, Pneumocystis jiroveci (carinii), Histoplasma, Aspergillus. Trach secretions sent for sputum culture shows 2+ polys, 1+ gram-positive cocci in pairs, chains and clusters and 1+ gram-negative rods. 11/14 Discussion regarding possibilities of discharge done with family. Family shows understanding of patient's condition. 11/15 Trach is capped and patient is placed on 3 L of NC briefly for 2 days. Patient is back on trach collar at high flow and NC. Cuffless trach was placed 9/17. Replaced with a cuffed Shiley XLT #7 10/3  Pt was on BiPAP via vent last night, tolerated it well   Currently on HFNC on 50 L of oxygen     Plan:  Continue oxygen via NC and trial trach collar downtitration.   continue nocturanl BiPAP EPAP 8, PSV 4   Suctioning via trach if patient is desatting and unable to expectorate phlegm. Continue inhalation treatment with Pulmicort and formoterol q12 , duonebs QID, atrovent and xopenex   Regular diet  Daily PT/OT      Large cell lymphoma (HCC)  Assessment & Plan  Large B-cell lymphoma, stage II. First chemo cycle 9/22 4 days of R-EPOCH. 9/27 Rituxan. 9/28 Filgrastim. Received nivestym 300 mc 7 days dcd on 10/26    Discontinue Prophylaxis as per Heme Oncology recommendations     9/22  4 days of R-EPOCH.  9/27 Rituxan  10/13 for [facility] cycle 2, which was done over 4 days and cytoxan on 10/19.  11/6 completed R-EPOCH cycle 3 and Cytoxan 11/7. Plan:  CT neck w wo contrast 11/15 (showed IJ thrombus as above)  Pt will receive her chemotherapy on 11/24 as scheduled regardless of PET scan   Transfuse blood products as needed. Keep Hgb>7 and Plt>20 for chemo   See acute respiratory failure above    Acute embolism and thrombosis of right internal jugular vein (HCC)  Assessment & Plan  CT soft tissues of the neck: Nonocclusive thrombus noted within the right internal jugular vein just above the Mediport entrance site into the internal jugular vein. No signs of pain, headaches, vision changes. Plan:  Eliquis 5 mg     Oropharyngeal mass  Assessment & Plan  9/14 Neck/ soft tissue CT: Large enhancing soft tissue mass identified within the left neck involving multiple spaces. Centered within the left oropharynx with extensions as described above into multiple spaces. This results in significant airway compromise. 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 Small level 3/level 4 nodes are seen within the neck. Tracheostomy by ENT. Replaced on 9/26. H/o tobacco abuse, daily smoker. On nicotine while inpatient, confirmed with patient she needs nicotine patch. CT soft tissue neck shows reduced mass effect from 9/14 to 10/13. Can consider reducing trach size if continues to improve.   Patient often refusing peg tube feeds but is feeding herself small meals orally. Plan:  ENT and heme onc following  As per speech therapist diet  Dysphagia 2. Continue parallel PEG tube feeds for nutrition. See acute respiratory failure and large B cell lymphoma above. Pancytopenia due to chemotherapy [facility])  Assessment & Plan  Recent Labs     11/17/23  1453 11/18/23  0538 11/19/23  0448   HGB 8.6* 8.2* 8.5*       '  Patient received 1 PRBC transfusion on 10/5 and 10/25. Her B/L hemoglobin is 12-14. No sites of bleeding, no black stools. Iron panel indicative of inflammatory anemia. Normal Vitamin B12 levels. Folate low at 5.5. Filgrastim 4 doses administered by heme/onc. Pt Hb stable at 8.5 today     Plan:  Trend hemoglobin and transfuse for <7. As per heme/onc transfuse irradiated and leukoreduced blood products. Trend platelets  Folic acid 1mg daily supplementation  As per conversation with Heme/oncology attending, patient pancytopenia associated with chemotherapy is expected/predictable. No further anemia w/u required. Low ANC management as per heme/onc    Blister (nonthermal) of left forearm, sequela  Assessment & Plan  Blisters of left upper extremity healing, no signs of infection, continue daily wound care. (HFpEF) heart failure with preserved ejection fraction [facility])  Assessment & Plan  Wt Readings from Last 3 Encounters:   11/19/23 76.3 kg (168 lb 3.4 oz)   09/07/23 74.6 kg (164 lb 6.4 oz)   08/30/23 73.3 kg (161 lb 9.6 oz)     Lab Results   Component Value Date    LVEF 60 08/28/2023     (H) 10/28/2023     (H) 09/29/2023     Echo 8/28/23: LVEF 60%. Plan:  K > 4   Measure I/O      Ambulatory dysfunction  Assessment & Plan  2/2 Impacted by chronic pain syndrome + prolonged hospital stay. Continue OOB with PT.    PT rec post acute rehab however reportedly Cannot get LTACH placement while getting chemo per CM  She is aware STR SNFs continue to follow and treatment can be done from a SNF level of care. PT would need to be on trach collar for at least 72 hours with no need for the vent. Aware that pt would need to be around 28% FiO2     Depression  Assessment & Plan  Patient on Zoloft 75 daily and Seroquel hs  Patient is depressed, multiple family/palliative discussions. patient is firm that she wants to live and to fight, she is just tired. Currently goal is disease treatment oriented. Full code. No SI/HI ideations. Palliative signed off, will reconsult if patient changes her mind regarding wishes. Chronic Superficial thrombophlebitis  Assessment & Plan  Right forearm soreness. RUE US: No acute or chronic deep vein thrombosis. 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 PO not severe enough to require renal dosing at this time, will continue to monitor and adjust dosing as needed. Continue DVT ppx with Eliquis     CKD (chronic kidney disease) stage 3, GFR 30-59 ml/min [facility])  Assessment & Plan  Lab Results   Component Value Date    EGFR 44 11/19/2023    EGFR 35 11/18/2023    EGFR 31 11/17/2023    CREATININE 1.24 11/19/2023    CREATININE 1.50 (H) 11/18/2023    CREATININE 1.66 (H) 11/17/2023     Baseline Cr between 0.75-1.1  Initial PO felt 2/2 prerenal azotemia 2/2 diuresis, reduced PO intake +/- ATN. Patient received 2 doses of Bumex IV 2 g as she had not been responding appropriately to IV 40 Lasix daily. Creatinine went up by 0.5 meeting criteria for an PO. Suspect most likely due to medications as a combination of prerenal and intrinsic. FENa was 0.2%, UA shows no casts or signs of infection, urine eosinophils 0%. Patient likely has prerenal PO from volume depletion. Received 2 L NS and 1 pRBC and cr went up by 0.07 still and Na and Cl went down, suspect that volume prevented further cr rise and free water excretion impaired by kidney function, allowing hyponatremia to increase.   Suspect that now that nephrotoxic medications have been stopped she responded well to Lasix and creatinine downtrended. HERIBERTO now resolved. Will be cautious about nephrotoxins and monitor as restarting EPOCH and ppx. Patient gets volume depleted and overloaded very easily. Especially from chemo. 10/19 Heriberto as cr went up by 0.3 in 48 hours from 0.82 on 10/17 to 1.22 on 10/19. Suspect this is prerenal hypovolemic, will see if patient improves with fluids. She gets overloaded easily so if no improvement suspect CRS again. 10/29 HERIBERTO cr went up by 0.3 again. She has 922 ml UOP in last 24 hours. HERIBERTO likely from lasix 40mg IV given yesterday. She is not hypovolemic.    11/17 HERIBERTO creatinine up to 1.8. Isolyte bolus 500 given. Plan:   Creatinine downtrended to 1.24 yesterday,   Continue to monitor UO/renal function. Avoid nephrotoxic agents  Follow up with BMP in the morning   Continue to monitor a.m. BMP. Pain syndrome, chronic  Assessment & Plan  Home regimen: Oxycodone 5 mg BID, Tylenol 650 mg q8 prn. Gabapentin 600 mg TID. Medical marijuana. Lumbar radiculopathy and impaired ambulatory dysfunction secondary to pain. Has bowel regimen and is having bowel movements daily. Patient required additional pain management during previous cycle and has some additional pain from tunneled line placement    Plan:  Continue gabapentin 300 mg TID, oxycodone 5 mg TID, prn Tylenol 650 mg q6. Oxycodone 5mg every 8 hours  Oxycodone 2.5 mg q6 PRN    Benign essential hypertension  Assessment & Plan  Home regimen Coreg 12.5 mg BID, Amlodipine 10 mg daily, and lisinopril 40 mg. All discontinued at this time secondary to hypotension and HERIBERTO since cycle 1. but stable currently without any antihypertensives. Systolic persistently elevated and tachycardic, potentially secondary to pain. Patient was more hypotensive during last chemo.  Coreg contraindicated during chemo, since cycles are every other week, would be better to stick with lopressor during duration of therapy as opposed to switching constantly. Plan:  Monitor vitals. Continue Norvasc 10 and lopressor 25 bid    Protein-calorie malnutrition ([address])  Assessment & Plan  Malnutrition Findings:   Adult Malnutrition type: Chronic illness  Adult Degree of Malnutrition: Unspecified protein calorie malnutrition  Malnutrition Characteristics: Inadequate energy, Other (comment) (stress of illness)                  360 Statement: Protein calorie malnutrition r/t inadequate intake as evidenced by >7 day period of poor intakes and stress of infection; currently treated with regular diet and nutrition supplements    BMI Findings: Body mass index is 32.17 kg/m². Disposition: Stepdown Level 1    ICU Core Measures     Vented Patient  VAP Bundle  VAP bundle ordered     A: Assess, Prevent, and Manage Pain Has pain been assessed? Yes  Need for changes to pain regimen? No   B: Both Spontaneous Awakening Trials (SATs) and Spontaneous Breathing Trials (SBTs) Plan to perform spontaneous awakening trial today? N/A   Plan to perform spontaneous breathing trial today? Chronic vent   Obvious barriers to extubation? NA   C: Choice of Sedation RASS Goal: 0 Alert and Calm  Need for changes to sedation or analgesia regimen? No   D: Delirium CAM-ICU: Negative   E: Early Mobility  Plan for early mobility? NA   F: Family Engagement Plan for family engagement today? Yes       Review of Invasive Devices:      Central access plan: Medications requiring central line      Prophylaxis:  VTE VTE covered by:  apixaban, Oral, 10 mg at 11/20/23 0929  [START ON 11/22/2023] apixaban, Oral       Stress Ulcer  covered byfamotidine (PEPCID) tablet 20 mg [[id number]]         Significant 24hr Events     24hr events: No significant events overnight, Pt was on BiPAP via vent during the night and tolerated it well. Has no new complaints. Subjective   Review of Systems   Constitutional: Negative. HENT:  Positive for voice change. Eyes: Negative.     Respiratory: Positive for shortness of breath. Cardiovascular: Negative. Gastrointestinal: Negative. Endocrine: Negative. Genitourinary: Negative. Musculoskeletal: Negative. Skin: Negative. Allergic/Immunologic: Negative. Neurological: Negative. Hematological: Negative. Psychiatric/Behavioral: Negative. Objective                            Vitals I/O      Most Recent Min/Max in 24hrs   Temp 98.4 °F (36.9 °C) Temp  Min: 98.4 °F (36.9 °C)  Max: 98.8 °F (37.1 °C)   Pulse 75 Pulse  Min: 68  Max: 110   Resp 16 Resp  Min: 0  Max: 38   /74 BP  Min: 103/74  Max: 127/72   O2 Sat 91 % SpO2  Min: 84 %  Max: 100 %      Intake/Output Summary (Last 24 hours) at 11/20/2023 1424  Last data filed at 11/20/2023 1001  Gross per 24 hour   Intake 600 ml   Output 475 ml   Net 125 ml       Diet Regular; Regular House    Invasive Monitoring           Physical Exam   Physical Exam  Eyes:      General: Vision grossly intact. Extraocular Movements: Extraocular movements intact. Conjunctiva/sclera: Conjunctivae normal.      Pupils: Pupils are equal, round, and reactive to light. Skin:     General: Skin is warm. HENT:      Head: Normocephalic and atraumatic. Right Ear: Hearing normal.      Left Ear: Hearing normal.   Neck:      Trachea: Tracheostomy present. Comments: Central venous line present on the right side   Cardiovascular:      Rate and Rhythm: Normal rate and regular rhythm. Pulses: Normal pulses. Heart sounds: Normal heart sounds. Musculoskeletal:         General: Normal range of motion. Abdominal:      Palpations: Abdomen is soft. Comments: PEG tube present on the right side    Constitutional:       General: She is in acute distress. Appearance: She is ill-appearing. Pulmonary:      Effort: Pulmonary effort is normal.      Breath sounds: Normal breath sounds.       Comments: Decrease breathing sounds at the base   Neurological:      General: No focal deficit present. Mental Status: She is alert and oriented to person, place and time. Diagnostic Studies      Imaging: Chest x ray I have personally reviewed pertinent reports.        Medications:  Scheduled PRN   amLODIPine, 10 mg, Daily  apixaban, 10 mg, BID  [START ON 11/22/2023] apixaban, 5 mg, BID  budesonide, 0.5 mg, Q12H  busPIRone, 30 mg, TID  chlorhexidine, 15 mL, Q12H ASHLEY  famotidine, 20 mg, BID  folic acid, 1 mg, Daily  formoterol, 20 mcg, Q12H  gabapentin, 300 mg, TID  levalbuterol, 1.25 mg, Q6H   And  ipratropium, 0.5 mg, Q6H  melatonin, 3 mg, HS  metoprolol tartrate, 25 mg, Q12H [facility]  nicotine, 7 mg, Daily  oxyCODONE, 5 mg, Q8H  polyethylene glycol, 17 g, Daily  QUEtiapine, 50 mg, HS  senna, 17.6 mg, BID  sertraline, 75 mg, Daily  sodium chloride, 4 mL, TID      alteplase, 2 mg, Q1MIN PRN  alteplase, 2 mg, Q1MIN PRN  alteplase, 2 mg, Q1MIN PRN  ondansetron, 4 mg, Q8H PRN  oxyCODONE, 2.5 mg, Q6H PRN       Continuous          Labs:    CBC    Recent Labs     11/19/23  0448   WBC 9.40   HGB 8.5*   HCT 27.5*        BMP    Recent Labs     11/19/23  0448   SODIUM 143   K 4.3      CO2 30   AGAP 6   BUN 19   CREATININE 1.24   CALCIUM 9.8       Coags    No recent results     Additional Electrolytes  No recent results       Blood Gas    Recent Labs     11/19/23  1302   PHART 7.390   MCH3PHB 48.5*   PO2ART 70.5*   CYQ2HMA 28.7*   BEART 3.1   SOURCE Radial, Right     Recent Labs     11/19/23  1302   SOURCE Radial, Right    LFTs  No recent results    Infectious  No recent results  Glucose  Recent Labs     11/19/23  0448   GLUC 128               Nereida Merchant MD

## 2023-11-20 NOTE — ASSESSMENT & PLAN NOTE
Right forearm soreness. RUE US: No acute or chronic deep vein thrombosis. 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 PO not severe enough to require renal dosing at this time, will continue to monitor and adjust dosing as needed.       Continue DVT ppx with Eliquis

## 2023-11-20 NOTE — PLAN OF CARE
Problem: PHYSICAL THERAPY ADULT  Goal: Performs mobility at highest level of function for planned discharge setting. See evaluation for individualized goals. Description: Treatment/Interventions: Functional transfer training, LE strengthening/ROM, Elevations, Therapeutic exercise, Endurance training, Patient/family training, Equipment eval/education, Bed mobility, Gait training, Spoke to nursing, Spoke to case management          See flowsheet documentation for full assessment, interventions and recommendations. Note: Prognosis: Fair  Problem List: Decreased strength, Decreased endurance, Impaired balance, Decreased mobility, Decreased safety awareness, Impaired judgement, Obesity, Decreased skin integrity  Assessment: Pt demonstrates slight improvement in overall functional status today as compared to previous session. Pt able to participate in stair negotiation today. Pt requires x3 steps to enter her home at this time. Pt did require less physical assitance with performance of stair negotiation today. However, pt does continue to demonstrate significant LE weakness, with buckling of BL LE during final attempt. Pt would continue to benefit from skilled PT services to address functional deficits and return to improved level of function. Continue to reccomend further progression of OOB mobility, LE strengthening, stair negotiation and challenge of dynamic balance tasks. Barriers to Discharge: Inaccessible home environment, Decreased caregiver support (3 PAUL)     Rehab Resource Intensity Level, PT: II (Moderate Resource Intensity)    See flowsheet documentation for full assessment.

## 2023-11-20 NOTE — PROGRESS NOTES
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 Required intubation and tracheostomy 9/17. Protracted hospital course to include RML pneumonia, LIJ Thrombus on eliquis, and pancytopenia secondary to chemotherapy - completed 3 cycles of R-EPOCH. Discharge limited by oxygen and ventilation requirements. Has been undergoing trach capping trial since 11/15.      24hr events - was on HFNC, reported tolerating BiPAP via vent well last night.   Able to wean FiO2 on HFNC this am.  Has intermittent secretions, she continues to desire to go home as goal.      VS AF, HR 70-80's, MAPS 70-80's, SpO2 98% HFNC 0.85/50L, I/Os -370cc  Exam  GEN older woman in bed, on HFNC, conversant through trach  HEENT MMM, no thrush  NECK trach in place, no purulence, no accessory muscle use  CV reg, single s1/2, no m/r  Pulm slightly diminished at bases, no wheeze, no rales  ABD +BS soft NTND, PEG in place  EXT warm, brisk cap refill, no edema    Laboratory and Diagnostics  Results from last 7 days   Lab Units 11/19/23  0448 11/18/23  0538 11/17/23  1453 11/17/23  1000 11/17/23  0557 11/16/23  0858 11/16/23  0550 11/15/23  0641 11/14/23  0440   WBC Thousand/uL 9.40 8.35  --   --  4.47  --  2.03* 1.40* 1.18*   HEMOGLOBIN g/dL 8.5* 8.2* 8.6* 6.9* 6.7* 7.3* 6.9* 7.2* 7.0*   HEMATOCRIT % 27.5* 26.3*  --   --  22.3*  --  22.2* 23.1* 22.3*   PLATELETS Thousands/uL 180 173  --   --  154  --  151 155 158   NEUTROS PCT %  --   --   --   --   --   --   --  35*  --    BANDS PCT %  --   --   --   --  26*  --  20*  --  1   MONOS PCT %  --   --   --   --   --   --   --  12  --    MONO PCT %  --   --   --   --  4  --  12  --  5   EOS PCT %  --   --   --   --  1  --  0 5 3     Results from last 7 days   Lab Units 11/19/23  0448 11/18/23  0538 11/17/23  0557 11/16/23  0550 11/15/23  7546 11/14/23  0440   SODIUM mmol/L 143 145 144 144 145 144   POTASSIUM mmol/L 4.3 4.5 4.0 4.3 4.1 3.4*   CHLORIDE mmol/L 107 108 106 105 106 107   CO2 mmol/L 30 31 29 32 32 34*   ANION GAP mmol/L 6 6 9 7 7 3   BUN mg/dL 19 21 24 25 22 22   CREATININE mg/dL 1.24 1.50* 1.66* 1.80* 1.63* 1.01   CALCIUM mg/dL 9.8 9.6 9.7 9.7 9.8 9.6   GLUCOSE RANDOM mg/dL 128 105 107 106 118 105     Results from last 7 days   Lab Units 11/16/23  0550 11/15/23  0641 11/14/23  0440   MAGNESIUM mg/dL 1.9 1.8* 2.1   PHOSPHORUS mg/dL  --   --  3.7      ABG:   Results from last 7 days   Lab Units 11/19/23  1302   PH ART  7.390   PCO2 ART mm Hg 48.5*   PO2 ART mm Hg 70.5*   HCO3 ART mmol/L 28.7*   BASE EXC ART mmol/L 3.1   ABG SOURCE  Radial, Right     MICRO  Sputum 11/10 - 4+ MRF  Bronch BAL 11/6 - 1+ Staph epi, neg PCP DFA, neg AFB, neg fungal and viral cultures  CMV PCR neg    RADIOGRAPHS - images personally reviewed  CXR 11/20 - trach in place, CVC in place, some mild left sided volume loss and central vascular prominence    CT neck 11/15 - improving left nasopharyngeal.oropharynx mass    CT Chest 11/3/2023 emphysema with alveolar infiltrates and some interstitial thickening, small b/l effusions    Echo 8/2023 - EF 49%, grade I diastolic dysfunction, mild-mod MR, normal RV size/function    Assessment  Acute hypoxic and chronic hypercarbic respiratory failure post trach  Large cell lymphoma with oropharyngeal mass post Trach/PEG  COPD w/o AE  Left IJ thrombus on eliquis   CKD III with resolved PO  Pancytopenia secondary to chemotherapy - improved  HFpEF  Ambulatory dysfunction  HTN  HLP  Depression  Prior RML pneumonia    Plan  NEURO - continue current AD regimen, oxycodone prn for pain  CARDIAC - continue BB, cont eliquis  PULM - struggle with liberation higher O2 needs. Capped trach likely not safe d/c plan to home given secretions and intermittent worsened hypoxia.  Will continue with nebs, VEST therapy, trach care, wean O2 as tolerated and will continue  nocturanl BiPAP EPAP 8, PSV 4 - will check with CM  GI - has PEG, tolerating oral diet, bowel regimen  RENAL - holding diuresis, monitor I/Os  HEME/ONC - remains on prophylaxis agents - re contact heme/onc about duration and clarify further chemotherapy plans (held prior to o/p CT/PET?)  ID - prophylaxis as above  ENDO - no active issues  ICU Care - SCDs, Eliquis, H2 blockade  Full Code  Will need to schedule further family meetings with HPM/oncology SHIRA Borrero,

## 2023-11-20 NOTE — ASSESSMENT & PLAN NOTE
Cuffless trach placed 9/17, transitioned to cuffed on 10/3. Oxygen requirements not improving. For mental health patient is on buspirone, quetiapine, and sertraline. For pain, gabapentin, oxycodone scheduled and prn, prn oxycodone mainly utilized for increased pain during chemo and after a bronch. On Guaifenesin, Atrovent and Xopenex for airway maintenance. No improvement in bilateral consolidation or atelectasis and groundglass opacities on repeat CT and chest x-rays. Bronchial cx with 3 colonies CoNS. CTCAP 10/12:  indicates additional groundglass opacity with paraseptal emphysema, which may be contributing to inability to remain off vent. Bronchoscopy performed and unremarkable. Samples sent to lab for culture. Patient was placed back on vent s/p procedure. Now on high flow. Completed 7 day course of cefepime and Vancomycin. Bronchial culture and gram stain negative. Micro negative for CMV, AFB, Fungitell, Pneumocystis jiroveci (carinii), Histoplasma, Aspergillus. Trach secretions sent for sputum culture shows 2+ polys, 1+ gram-positive cocci in pairs, chains and clusters and 1+ gram-negative rods. 11/14 Discussion regarding possibilities of discharge done with family. Family shows understanding of patient's condition. 11/15 Trach is capped and patient is placed on 3 L of NC briefly for 2 days. Patient is back on trach collar at high flow and NC. Cuffless trach was placed 9/17. Replaced with a cuffed Shiley XLT #7 10/3  Pt was on BiPAP via vent last night, tolerated it well   Currently on HFNC on 50 L of oxygen     Plan:  Continue oxygen via NC and trial trach collar downtitration. continue  nocturanl BiPAP EPAP 8, PSV 4   Suctioning via trach if patient is desatting and unable to expectorate phlegm.   Continue inhalation treatment with Pulmicort and formoterol q12 , duonebs QID, atrovent and xopenex   Regular diet  Daily PT/OT

## 2023-11-20 NOTE — PHYSICAL THERAPY NOTE
Physical Therapy Treatment Note    Patient's Name: Wilma Bacon  : 1955         11/20/23 1144   Note Type   Note Type Treatment   Pain Assessment   Pain Assessment Tool 0-10   Pain Score 6   Pain Location/Orientation Location: Generalized   Restrictions/Precautions   Weight Bearing Precautions Per Order No   Other Precautions Chair Alarm; Bed Alarm;O2;Fall Risk;Multiple lines;Telemetry  (HF via trach)   General   Chart Reviewed Yes   Response to Previous Treatment Patient reporting fatigue but able to participate. ;Patient with no complaints from previous session. Family/Caregiver Present No   Cognition   Orientation Level Oriented X4   Following Commands Follows one step commands without difficulty   Comments pt ID by wristband, name and    Subjective   Subjective pt supine in bed, agreeable to PT eval   Bed Mobility   Supine to Sit 5  Supervision   Additional items Increased time required;HOB elevated   Additional Comments while sitting EOB, pt sp O2 to 81-84% RN made aware, RT adjusts HFNC to max setting   Transfers   Sit to Stand 5  Supervision   Additional items Increased time required;Verbal cues   Stand to Sit 5  Supervision   Additional items Increased time required;Verbal cues   Additional Comments x6 STS performed throughout session, pt does not require asssit to complete trasnfer, however at times fatigues quickly and can only tolerate standing for less than 10 seconds   Ambulation/Elevation   Gait pattern Decreased foot clearance; Short stride; Excessively slow   Gait Assistance 4  Minimal assist  (CGA)   Additional items Assist x 1   Assistive Device Rolling walker   Distance 5'x1   Stair Management Assistance 4  Minimal assist   Additional items Assist x 1; Increased time required   Stair Management Technique One rail R  (HHA L)   Number of Stairs 1  (x1, x2, x1, x2, x2)   Ambulation/Elevation Additional Comments pt performs a total of x8 stairs today, with vc to utilize L HHA/rail pt pulls to standing with less assistance. pt does experience buckling with final stair attempt, requiring PT assist to correct   Balance   Static Sitting Good   Dynamic Sitting Fair +   Static Standing Fair   Dynamic Standing Fair -   Ambulatory Poor  (RW)   Activity Tolerance   Activity Tolerance Patient limited by fatigue;Treatment limited secondary to medical complications (Comment)  (weakness, deconditioning)   Medical Staff Made Aware CM, RT   Nurse Made Aware RN charles pre/post   Assessment   Prognosis Fair   Problem List Decreased strength;Decreased endurance; Impaired balance;Decreased mobility; Decreased safety awareness; Impaired judgement;Obesity; Decreased skin integrity   Assessment Pt demonstrates slight improvement in overall functional status today as compared to previous session. Pt able to participate in stair negotiation today. Pt requires x3 steps to enter her home at this time. Pt did require less physical assitance with performance of stair negotiation today. However, pt does continue to demonstrate significant LE weakness, with buckling of BL LE during final attempt. Pt would continue to benefit from skilled PT services to address functional deficits and return to improved level of function. Continue to reccomend further progression of OOB mobility, LE strengthening, stair negotiation and challenge of dynamic balance tasks. Goals   Patient Goals to get home for [facility] Expiration Date 11/26/23   Short Term Goal #1 In 10-14 days pt will be able to: 1. Demonstrate ability to perform all aspects of bed mobility independently to improve functional safety. 2. Perform functional transfers independently to facilitate safe return to previous living environment. PROGRESSING 3. Ambulate 50 ft with LRAD and supervision with stable vitals to improve safety with household distances and reduce fall risk. PROGRESSING 4.  Improve LE strength grades by 1 to increase ease of functional mobility with transfers and gait. 5. Pt will demonstrate improved balance by one grade in order to decrease risk of falls. PROGRESSING 6. Tolerate 3 hours OOB to promote improved activity tolerance. MET 7. Improve 30s STS to at least 10 repetitions to decrease risk of falls. PROGRESSING   PT Treatment Day 9   Plan   Treatment/Interventions Functional transfer training;LE strengthening/ROM; Elevations; Therapeutic exercise;Cognitive reorientation;Patient/family training;Equipment eval/education; Bed mobility;Gait training;Spoke to nursing;Spoke to case management;OT   Progress Slow progress, decreased activity tolerance   PT Frequency 3-5x/wk   Discharge Recommendation   Rehab Resource Intensity Level, PT II (Moderate Resource Intensity)   AM-PAC Basic Mobility Inpatient   Turning in Flat Bed Without Bedrails 3   Lying on Back to Sitting on Edge of Flat Bed Without Bedrails 3   Moving Bed to Chair 3   Standing Up From Chair Using Arms 3   Walk in Room 2   Climb 3-5 Stairs With Railing 2   Basic Mobility Inpatient Raw Score 16   Basic Mobility Standardized Score 38.32   Highest Level Of Mobility   -HLM Goal 5: Stand one or more mins   JH-HLM Achieved 5: Stand (1 or more minutes)   Education   Education Provided Mobility training   Patient Demonstrates acceptance/verbal understanding   End of Consult   Patient Position at End of Consult Bedside chair;Bed/Chair alarm activated; Other (comment); All needs within reach  (xray in room)   The patient's AM-PAC Basic Mobility Inpatient Short Form Raw Score is 16. A Raw score of less than or equal to 16 suggests the patient may benefit from discharge to post-acute rehabilitation services. Please also refer to the recommendation of the Physical Therapist for safe discharge planning.     Yasmani Perez, PT

## 2023-11-20 NOTE — PLAN OF CARE
Problem: MOBILITY - ADULT  Goal: Maintain or return to baseline ADL function  Description: INTERVENTIONS:  -  Assess patient's ability to carry out ADLs; assess patient's baseline for ADL function and identify physical deficits which impact ability to perform ADLs (bathing, care of mouth/teeth, toileting, grooming, dressing, etc.)  - Assess/evaluate cause of self-care deficits   - Assess range of motion  - Assess patient's mobility; develop plan if impaired  - Assess patient's need for assistive devices and provide as appropriate  - Encourage maximum independence but intervene and supervise when necessary  - Involve family in performance of ADLs  - Assess for home care needs following discharge   - Consider OT consult to assist with ADL evaluation and planning for discharge  - Provide patient education as appropriate  Outcome: Progressing  Goal: Maintains/Returns to pre admission functional level  Description: INTERVENTIONS:  - Perform BMAT or MOVE assessment daily.   - Set and communicate daily mobility goal to care team and patient/family/caregiver. - Collaborate with rehabilitation services on mobility goals if consulted  - Perform Range of Motion 3 times a day. - Reposition patient every 2 hours.   - Dangle patient 2 times a day  - Stand patient 2 times a day  - Ambulate patient 2 times a day  - Out of bed to chair 2 times a day   - Out of bed for meals 2 times a day  - Out of bed for toileting  - Record patient progress and toleration of activity level   Outcome: Progressing     Problem: Prexisting or High Potential for Compromised Skin Integrity  Goal: Skin integrity is maintained or improved  Description: INTERVENTIONS:  - Identify patients at risk for skin breakdown  - Assess and monitor skin integrity  - Assess and monitor nutrition and hydration status  - Monitor labs   - Assess for incontinence   - Turn and reposition patient  - Assist with mobility/ambulation  - Relieve pressure over bony prominences  - Avoid friction and shearing  - Provide appropriate hygiene as needed including keeping skin clean and dry  - Evaluate need for skin moisturizer/barrier cream  - Collaborate with interdisciplinary team   - Patient/family teaching  - Consider wound care consult   Outcome: Progressing     Problem: Nutrition/Hydration-ADULT  Goal: Nutrient/Hydration intake appropriate for improving, restoring or maintaining nutritional needs  Description: Monitor and assess patient's nutrition/hydration status for malnutrition. Collaborate with interdisciplinary team and initiate plan and interventions as ordered. Monitor patient's weight and dietary intake as ordered or per policy. Utilize nutrition screening tool and intervene as necessary. Determine patient's food preferences and provide high-protein, high-caloric foods as appropriate.      INTERVENTIONS:  - Monitor oral intake, urinary output, labs, and treatment plans  - Assess nutrition and hydration status and recommend course of action  - Evaluate amount of meals eaten  - Assist patient with eating if necessary   - Allow adequate time for meals  - Recommend/ encourage appropriate diets, oral nutritional supplements, and vitamin/mineral supplements  - Order, calculate, and assess calorie counts as needed  - Recommend, monitor, and adjust tube feedings and TPN/PPN based on assessed needs  - Assess need for intravenous fluids  - Provide specific nutrition/hydration education as appropriate  - Include patient/family/caregiver in decisions related to nutrition  Outcome: Progressing     Problem: PAIN - ADULT  Goal: Verbalizes/displays adequate comfort level or baseline comfort level  Description: Interventions:  - Encourage patient to monitor pain and request assistance  - Assess pain using appropriate pain scale  - Administer analgesics based on type and severity of pain and evaluate response  - Implement non-pharmacological measures as appropriate and evaluate response  - Consider cultural and social influences on pain and pain management  - Notify physician/advanced practitioner if interventions unsuccessful or patient reports new pain  Outcome: Progressing     Problem: INFECTION - ADULT  Goal: Absence or prevention of progression during hospitalization  Description: INTERVENTIONS:  - Assess and monitor for signs and symptoms of infection  - Monitor lab/diagnostic results  - Monitor all insertion sites, i.e. indwelling lines, tubes, and drains  - Monitor endotracheal if appropriate and nasal secretions for changes in amount and color  - [place] appropriate cooling/warming therapies per order  - Administer medications as ordered  - Instruct and encourage patient and family to use good hand hygiene technique  - Identify and instruct in appropriate isolation precautions for identified infection/condition  Outcome: Progressing  Goal: Absence of fever/infection during neutropenic period  Description: INTERVENTIONS:  - Monitor WBC    Outcome: Progressing     Problem: SAFETY ADULT  Goal: Maintain or return to baseline ADL function  Description: INTERVENTIONS:  -  Assess patient's ability to carry out ADLs; assess patient's baseline for ADL function and identify physical deficits which impact ability to perform ADLs (bathing, care of mouth/teeth, toileting, grooming, dressing, etc.)  - Assess/evaluate cause of self-care deficits   - Assess range of motion  - Assess patient's mobility; develop plan if impaired  - Assess patient's need for assistive devices and provide as appropriate  - Encourage maximum independence but intervene and supervise when necessary  - Involve family in performance of ADLs  - Assess for home care needs following discharge   - Consider OT consult to assist with ADL evaluation and planning for discharge  - Provide patient education as appropriate  Outcome: Progressing  Goal: Maintains/Returns to pre admission functional level  Description: INTERVENTIONS:  - Perform BMAT or MOVE assessment daily.   - Set and communicate daily mobility goal to care team and patient/family/caregiver. - Collaborate with rehabilitation services on mobility goals if consulted  - Perform Range of Motion 3 times a day. - Reposition patient every 2 hours.   - Dangle patient 2 times a day  - Stand patient 2 times a day  - Ambulate patient 2 times a day  - Out of bed to chair 2 times a day   - Out of bed for meals 2 times a day  - Out of bed for toileting  - Record patient progress and toleration of activity level   Outcome: Progressing  Goal: Patient will remain free of falls  Description: INTERVENTIONS:  - Educate patient/family on patient safety including physical limitations  - Instruct patient to call for assistance with activity   - Consult OT/PT to assist with strengthening/mobility   - Keep Call bell within reach  - Keep bed low and locked with side rails adjusted as appropriate  - Keep care items and personal belongings within reach  - Initiate and maintain comfort rounds  - Make Fall Risk Sign visible to staff  - Offer Toileting every 2 Hours, in advance of need  - Initiate/Maintain bed alarm  - Obtain necessary fall risk management equipment:   - Apply yellow socks and bracelet for high fall risk patients  - Consider moving patient to room near nurses station  Outcome: Progressing     Problem: DISCHARGE PLANNING  Goal: Discharge to home or other facility with appropriate resources  Description: INTERVENTIONS:  - Identify barriers to discharge w/patient and caregiver  - Arrange for needed discharge resources and transportation as appropriate  - Identify discharge learning needs (meds, wound care, etc.)  - Arrange for interpretive services to assist at discharge as needed  - Refer to Case Management Department for coordinating discharge planning if the patient needs post-hospital services based on physician/advanced practitioner order or complex needs related to functional status, cognitive ability, or social support system  Outcome: Progressing     Problem: Knowledge Deficit  Goal: Patient/family/caregiver demonstrates understanding of disease process, treatment plan, medications, and discharge instructions  Description: Complete learning assessment and assess knowledge base.   Interventions:  - Provide teaching at level of understanding  - Provide teaching via preferred learning methods  Outcome: Progressing     Problem: RESPIRATORY - ADULT  Goal: Achieves optimal ventilation and oxygenation  Description: INTERVENTIONS:  - Assess for changes in respiratory status  - Assess for changes in mentation and behavior  - Position to facilitate oxygenation and minimize respiratory effort  - Oxygen administered by appropriate delivery if ordered  - Initiate smoking cessation education as indicated  - Encourage broncho-pulmonary hygiene including cough, deep breathe, Incentive Spirometry  - Assess the need for suctioning and aspirate as needed  - Assess and instruct to report SOB or any respiratory difficulty  - Respiratory Therapy support as indicated  Outcome: Progressing     Problem: GENITOURINARY - ADULT  Goal: Maintains or returns to baseline urinary function  Description: INTERVENTIONS:  - Assess urinary function  - Encourage oral fluids to ensure adequate hydration if ordered  - Administer IV fluids as ordered to ensure adequate hydration  - Administer ordered medications as needed  - Offer frequent toileting  - Follow urinary retention protocol if ordered  Outcome: Progressing  Goal: Absence of urinary retention  Description: INTERVENTIONS:  - Assess patient’s ability to void and empty bladder  - Monitor I/O  - Bladder scan as needed  - Discuss with physician/AP medications to alleviate retention as needed  - Discuss catheterization for long term situations as appropriate  Outcome: Progressing     Problem: METABOLIC, FLUID AND ELECTROLYTES - ADULT  Goal: Electrolytes maintained within normal limits  Description: INTERVENTIONS:  - Monitor labs and assess patient for signs and symptoms of electrolyte imbalances  - Administer electrolyte replacement as ordered  - Monitor response to electrolyte replacements, including repeat lab results as appropriate  - Instruct patient on fluid and nutrition as appropriate  Outcome: Progressing  Goal: Fluid balance maintained  Description: INTERVENTIONS:  - Monitor labs   - Monitor I/O and WT  - Instruct patient on fluid and nutrition as appropriate  - Assess for signs & symptoms of volume excess or deficit  Outcome: Progressing  Goal: Glucose maintained within target range  Description: INTERVENTIONS:  - Monitor Blood Glucose as ordered  - Assess for signs and symptoms of hyperglycemia and hypoglycemia  - Administer ordered medications to maintain glucose within target range  - Assess nutritional intake and initiate nutrition service referral as needed  Outcome: Progressing

## 2023-11-20 NOTE — ASSESSMENT & PLAN NOTE
Recent Labs     11/17/23  1453 11/18/23  0538 11/19/23  0448   HGB 8.6* 8.2* 8.5*       '  Patient received 1 PRBC transfusion on 10/5 and 10/25. Her B/L hemoglobin is 12-14. No sites of bleeding, no black stools. Iron panel indicative of inflammatory anemia. Normal Vitamin B12 levels. Folate low at 5.5. Filgrastim 4 doses administered by heme/onc. Pt Hb stable at 8.5 today     Plan:  Trend hemoglobin and transfuse for <7. As per heme/onc transfuse irradiated and leukoreduced blood products. Trend platelets  Folic acid 1mg daily supplementation  As per conversation with Heme/oncology attending, patient pancytopenia associated with chemotherapy is expected/predictable. No further anemia w/u required.   Low ANC management as per heme/onc

## 2023-11-20 NOTE — ASSESSMENT & PLAN NOTE
Large B-cell lymphoma, stage II. First chemo cycle 9/22 4 days of R-EPOCH. 9/27 Rituxan. 9/28 Filgrastim. Received nivestym 300 mc 7 days dcd on 10/26    Discontinue Prophylaxis as per Heme Oncology recommendations     9/22  4 days of R-EPOCH.  9/27 Rituxan  10/13 for [facility] cycle 2, which was done over 4 days and cytoxan on 10/19.  11/6 completed R-EPOCH cycle 3 and Cytoxan 11/7. Plan:  CT neck w wo contrast 11/15 (showed IJ thrombus as above)  Pt will receive her chemotherapy on 11/24 as scheduled regardless of PET scan   Transfuse blood products as needed.   Keep Hgb>7 and Plt>20 for chemo   See acute respiratory failure above

## 2023-11-20 NOTE — ASSESSMENT & PLAN NOTE
CT soft tissues of the neck: Nonocclusive thrombus noted within the right internal jugular vein just above the Mediport entrance site into the internal jugular vein. No signs of pain, headaches, vision changes.     Plan:  Eliquis 5 mg

## 2023-11-20 NOTE — ASSESSMENT & PLAN NOTE
Lab Results   Component Value Date    EGFR 44 11/19/2023    EGFR 35 11/18/2023    EGFR 31 11/17/2023    CREATININE 1.24 11/19/2023    CREATININE 1.50 (H) 11/18/2023    CREATININE 1.66 (H) 11/17/2023     Baseline Cr between 0.75-1.1  Initial HERIBERTO felt 2/2 prerenal azotemia 2/2 diuresis, reduced PO intake +/- ATN. Patient received 2 doses of Bumex IV 2 g as she had not been responding appropriately to IV 40 Lasix daily. Creatinine went up by 0.5 meeting criteria for an HERIBERTO. Suspect most likely due to medications as a combination of prerenal and intrinsic. FENa was 0.2%, UA shows no casts or signs of infection, urine eosinophils 0%. Patient likely has prerenal HERIBERTO from volume depletion. Received 2 L NS and 1 pRBC and cr went up by 0.07 still and Na and Cl went down, suspect that volume prevented further cr rise and free water excretion impaired by kidney function, allowing hyponatremia to increase. Suspect that now that nephrotoxic medications have been stopped she responded well to Lasix and creatinine downtrended. HERIBERTO now resolved. Will be cautious about nephrotoxins and monitor as restarting EPOCH and ppx. Patient gets volume depleted and overloaded very easily. Especially from chemo. 10/19 Heriberto as cr went up by 0.3 in 48 hours from 0.82 on 10/17 to 1.22 on 10/19. Suspect this is prerenal hypovolemic, will see if patient improves with fluids. She gets overloaded easily so if no improvement suspect CRS again. 10/29 HERIBERTO cr went up by 0.3 again. She has 922 ml UOP in last 24 hours. HERIBERTO likely from lasix 40mg IV given yesterday. She is not hypovolemic.    11/17 HERIBERTO creatinine up to 1.8. Isolyte bolus 500 given. Plan:   Creatinine downtrended to 1.24 yesterday,   Continue to monitor UO/renal function. Avoid nephrotoxic agents  Follow up with BMP in the morning   Continue to monitor a.m. BMP.

## 2023-11-21 ENCOUNTER — APPOINTMENT (INPATIENT)
Dept: NON INVASIVE DIAGNOSTICS | Facility: HOSPITAL | Age: 68
DRG: 004 | End: 2023-11-21
Payer: COMMERCIAL

## 2023-11-21 LAB
ANION GAP SERPL CALCULATED.3IONS-SCNC: 6 MMOL/L
AORTIC VALVE MEAN VELOCITY: 10.9 M/S
APICAL FOUR CHAMBER EJECTION FRACTION: 63 %
AV AREA BY CONTINUOUS VTI: 2.9 CM2
AV AREA PEAK VELOCITY: 2.7 CM2
AV LVOT MEAN GRADIENT: 5 MMHG
AV LVOT PEAK GRADIENT: 9 MMHG
AV MEAN GRADIENT: 6 MMHG
AV PEAK GRADIENT: 12 MMHG
AV VALVE AREA: 2.88 CM2
AV VELOCITY RATIO: 0.87
BUN SERPL-MCNC: 20 MG/DL (ref 5–25)
CALCIUM SERPL-MCNC: 9.1 MG/DL (ref 8.4–10.2)
CHLORIDE SERPL-SCNC: 106 MMOL/L (ref 96–108)
CO2 SERPL-SCNC: 31 MMOL/L (ref 21–32)
CREAT SERPL-MCNC: 1.31 MG/DL (ref 0.6–1.3)
DME PARACHUTE DELIVERY DATE ACTUAL: NORMAL
DME PARACHUTE DELIVERY DATE REQUESTED: NORMAL
DME PARACHUTE DELIVERY DATE REQUESTED: NORMAL
DME PARACHUTE DELIVERY NOTE: NORMAL
DME PARACHUTE ITEM DESCRIPTION: NORMAL
DME PARACHUTE ORDER STATUS: NORMAL
DME PARACHUTE ORDER STATUS: NORMAL
DME PARACHUTE SUPPLIER NAME: NORMAL
DME PARACHUTE SUPPLIER NAME: NORMAL
DME PARACHUTE SUPPLIER PHONE: NORMAL
DME PARACHUTE SUPPLIER PHONE: NORMAL
DOP CALC AO PEAK VEL: 1.76 M/S
DOP CALC AO VTI: 29.89 CM
DOP CALC LVOT AREA: 3.14 CM2
DOP CALC LVOT CARDIAC INDEX: 4.47 L/MIN/M2
DOP CALC LVOT CARDIAC OUTPUT: 7.69 L/MIN
DOP CALC LVOT DIAMETER: 2 CM
DOP CALC LVOT PEAK VEL VTI: 27.46 CM
DOP CALC LVOT PEAK VEL: 1.53 M/S
DOP CALC LVOT STROKE INDEX: 50.6 ML/M2
DOP CALC LVOT STROKE VOLUME: 86.22
ERYTHROCYTE [DISTWIDTH] IN BLOOD BY AUTOMATED COUNT: 16 % (ref 11.6–15.1)
GFR SERPL CREATININE-BSD FRML MDRD: 41 ML/MIN/1.73SQ M
GLUCOSE SERPL-MCNC: 82 MG/DL (ref 65–140)
HCT VFR BLD AUTO: 26.1 % (ref 34.8–46.1)
HGB BLD-MCNC: 7.7 G/DL (ref 11.5–15.4)
MCH RBC QN AUTO: 28.9 PG (ref 26.8–34.3)
MCHC RBC AUTO-ENTMCNC: 29.5 G/DL (ref 31.4–37.4)
MCV RBC AUTO: 98 FL (ref 82–98)
MV E'TISSUE VEL-SEP: 6 CM/S
MYCOBACTERIUM SPEC CULT: NORMAL
PLATELET # BLD AUTO: 193 THOUSANDS/UL (ref 149–390)
PMV BLD AUTO: 9.3 FL (ref 8.9–12.7)
POTASSIUM SERPL-SCNC: 3.9 MMOL/L (ref 3.5–5.3)
RBC # BLD AUTO: 2.66 MILLION/UL (ref 3.81–5.12)
RHODAMINE-AURAMINE STN SPEC: NORMAL
SL CV LV EF: 65
SODIUM SERPL-SCNC: 143 MMOL/L (ref 135–147)
TRICUSPID ANNULAR PLANE SYSTOLIC EXCURSION: 1.9 CM
WBC # BLD AUTO: 5.32 THOUSAND/UL (ref 4.31–10.16)

## 2023-11-21 PROCEDURE — 93325 DOPPLER ECHO COLOR FLOW MAPG: CPT | Performed by: INTERNAL MEDICINE

## 2023-11-21 PROCEDURE — NC001 PR NO CHARGE: Performed by: INTERNAL MEDICINE

## 2023-11-21 PROCEDURE — 80048 BASIC METABOLIC PNL TOTAL CA: CPT

## 2023-11-21 PROCEDURE — 85027 COMPLETE CBC AUTOMATED: CPT

## 2023-11-21 PROCEDURE — 94760 N-INVAS EAR/PLS OXIMETRY 1: CPT

## 2023-11-21 PROCEDURE — 94640 AIRWAY INHALATION TREATMENT: CPT

## 2023-11-21 PROCEDURE — 94003 VENT MGMT INPAT SUBQ DAY: CPT

## 2023-11-21 PROCEDURE — 93308 TTE F-UP OR LMTD: CPT

## 2023-11-21 PROCEDURE — 99232 SBSQ HOSP IP/OBS MODERATE 35: CPT | Performed by: INTERNAL MEDICINE

## 2023-11-21 PROCEDURE — 93308 TTE F-UP OR LMTD: CPT | Performed by: INTERNAL MEDICINE

## 2023-11-21 PROCEDURE — 93321 DOPPLER ECHO F-UP/LMTD STD: CPT | Performed by: INTERNAL MEDICINE

## 2023-11-21 PROCEDURE — 94150 VITAL CAPACITY TEST: CPT

## 2023-11-21 RX ORDER — POTASSIUM CHLORIDE 20 MEQ/1
20 TABLET, EXTENDED RELEASE ORAL ONCE
Status: COMPLETED | OUTPATIENT
Start: 2023-11-21 | End: 2023-11-21

## 2023-11-21 RX ADMIN — SERTRALINE 75 MG: 25 TABLET, FILM COATED ORAL at 08:05

## 2023-11-21 RX ADMIN — GABAPENTIN 300 MG: 300 CAPSULE ORAL at 21:12

## 2023-11-21 RX ADMIN — BUDESONIDE 0.5 MG: 0.5 INHALANT ORAL at 20:22

## 2023-11-21 RX ADMIN — FORMOTEROL FUMARATE DIHYDRATE 20 MCG: 20 SOLUTION RESPIRATORY (INHALATION) at 07:18

## 2023-11-21 RX ADMIN — POTASSIUM CHLORIDE 20 MEQ: 1500 TABLET, EXTENDED RELEASE ORAL at 10:44

## 2023-11-21 RX ADMIN — GABAPENTIN 300 MG: 300 CAPSULE ORAL at 17:03

## 2023-11-21 RX ADMIN — IPRATROPIUM BROMIDE 0.5 MG: 0.5 SOLUTION RESPIRATORY (INHALATION) at 20:22

## 2023-11-21 RX ADMIN — FORMOTEROL FUMARATE DIHYDRATE 20 MCG: 20 SOLUTION RESPIRATORY (INHALATION) at 20:22

## 2023-11-21 RX ADMIN — CHLORHEXIDINE GLUCONATE 15 ML: 1.2 SOLUTION ORAL at 21:14

## 2023-11-21 RX ADMIN — BUSPIRONE HYDROCHLORIDE 30 MG: 10 TABLET ORAL at 08:04

## 2023-11-21 RX ADMIN — IPRATROPIUM BROMIDE 0.5 MG: 0.5 SOLUTION RESPIRATORY (INHALATION) at 13:15

## 2023-11-21 RX ADMIN — FOLIC ACID 1 MG: 1 TABLET ORAL at 08:05

## 2023-11-21 RX ADMIN — OXYCODONE HYDROCHLORIDE 5 MG: 5 SOLUTION ORAL at 21:13

## 2023-11-21 RX ADMIN — FAMOTIDINE 20 MG: 20 TABLET, FILM COATED ORAL at 08:04

## 2023-11-21 RX ADMIN — OXYCODONE HYDROCHLORIDE 5 MG: 5 SOLUTION ORAL at 12:38

## 2023-11-21 RX ADMIN — LEVALBUTEROL HYDROCHLORIDE 1.25 MG: 1.25 SOLUTION RESPIRATORY (INHALATION) at 01:44

## 2023-11-21 RX ADMIN — FAMOTIDINE 20 MG: 20 TABLET, FILM COATED ORAL at 17:03

## 2023-11-21 RX ADMIN — APIXABAN 10 MG: 5 TABLET, FILM COATED ORAL at 21:11

## 2023-11-21 RX ADMIN — BUSPIRONE HYDROCHLORIDE 30 MG: 10 TABLET ORAL at 17:03

## 2023-11-21 RX ADMIN — BUSPIRONE HYDROCHLORIDE 30 MG: 10 TABLET ORAL at 21:14

## 2023-11-21 RX ADMIN — BUDESONIDE 0.5 MG: 0.5 INHALANT ORAL at 07:18

## 2023-11-21 RX ADMIN — SODIUM CHLORIDE SOLN NEBU 3% 4 ML: 3 NEBU SOLN at 07:18

## 2023-11-21 RX ADMIN — METOPROLOL TARTRATE 25 MG: 25 TABLET, FILM COATED ORAL at 08:05

## 2023-11-21 RX ADMIN — IPRATROPIUM BROMIDE 0.5 MG: 0.5 SOLUTION RESPIRATORY (INHALATION) at 01:45

## 2023-11-21 RX ADMIN — NICOTINE 7 MG: 7 PATCH, EXTENDED RELEASE TRANSDERMAL at 08:05

## 2023-11-21 RX ADMIN — CHLORHEXIDINE GLUCONATE 15 ML: 1.2 SOLUTION ORAL at 08:04

## 2023-11-21 RX ADMIN — IPRATROPIUM BROMIDE 0.5 MG: 0.5 SOLUTION RESPIRATORY (INHALATION) at 07:18

## 2023-11-21 RX ADMIN — AMLODIPINE BESYLATE 10 MG: 5 TABLET ORAL at 08:07

## 2023-11-21 RX ADMIN — OXYCODONE HYDROCHLORIDE 5 MG: 5 SOLUTION ORAL at 05:06

## 2023-11-21 RX ADMIN — LEVALBUTEROL HYDROCHLORIDE 1.25 MG: 1.25 SOLUTION RESPIRATORY (INHALATION) at 07:18

## 2023-11-21 RX ADMIN — QUETIAPINE FUMARATE 50 MG: 25 TABLET ORAL at 21:20

## 2023-11-21 RX ADMIN — GABAPENTIN 300 MG: 300 CAPSULE ORAL at 08:05

## 2023-11-21 RX ADMIN — MELATONIN 3 MG: at 21:20

## 2023-11-21 RX ADMIN — LEVALBUTEROL HYDROCHLORIDE 1.25 MG: 1.25 SOLUTION RESPIRATORY (INHALATION) at 13:15

## 2023-11-21 RX ADMIN — LEVALBUTEROL HYDROCHLORIDE 1.25 MG: 1.25 SOLUTION RESPIRATORY (INHALATION) at 20:22

## 2023-11-21 RX ADMIN — METOPROLOL TARTRATE 25 MG: 25 TABLET, FILM COATED ORAL at 21:10

## 2023-11-21 RX ADMIN — APIXABAN 10 MG: 5 TABLET, FILM COATED ORAL at 08:04
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
## 2023-11-21 NOTE — ASSESSMENT & PLAN NOTE
Recent Labs     11/19/23  0448 11/21/23  0507   HGB 8.5* 7.7*       '  Patient received 1 PRBC transfusion on 10/5 and 10/25. Her B/L hemoglobin is 12-14. No sites of bleeding, no black stools. Iron panel indicative of inflammatory anemia. Normal Vitamin B12 levels. Folate low at 5.5. Filgrastim 4 doses administered by heme/onc. Pt Hb stable at 7.7 today     Plan:  Trend hemoglobin and transfuse for <7. As per heme/onc transfuse irradiated and leukoreduced blood products. Trend platelets  Folic acid 1mg daily supplementation  As per conversation with Heme/oncology attending, patient pancytopenia associated with chemotherapy is expected/predictable. No further anemia w/u required.   Low ANC management as per heme/onc

## 2023-11-21 NOTE — PROGRESS NOTES
Family meeting    Family meeting held with patient, her daughter, Zaria Kelley MSW, myself, Dr. Fady Jimenez - oncology. We discussed completed 3 rounds chemotherapy, planning for 4th later this week or early next week. Discussed hypoxia has been limiting factor for discharge and concern for possible need for home vent/BiPAP. Leana Silva reported she is very frustrated and desires to go home as soon as possible and does not want any inpatient therapies. She desires more aggressive evaluation to see if we can wean her oxygen - currently SpO2 95% on 0.5 trach collar. I advised we can give trial of trach collar only tonight (no BiPAP) and monitor on current settings of TC. Return to vent or increased TC if sustained SpO2 <85% or respiratory distress. I advised she needs to be consistent with secretion management and trach care. Should she tolerate this overnight will plan for d/c to home with supplemental oxygen only. If she does not tolerate this we will move forward with home Vent/BiPAP support which will take more time and she need to remain on 50% FiO2 or less. Depending on location will plan for chemotherapy as listed above - infusion center, readmission, vs current admission. Leana Silva was in agreement with this plan as was her daughter.      Leticia Acevedo DO

## 2023-11-21 NOTE — PLAN OF CARE
Problem: MOBILITY - ADULT  Goal: Maintain or return to baseline ADL function  Description: INTERVENTIONS:  -  Assess patient's ability to carry out ADLs; assess patient's baseline for ADL function and identify physical deficits which impact ability to perform ADLs (bathing, care of mouth/teeth, toileting, grooming, dressing, etc.)  - Assess/evaluate cause of self-care deficits   - Assess range of motion  - Assess patient's mobility; develop plan if impaired  - Assess patient's need for assistive devices and provide as appropriate  - Encourage maximum independence but intervene and supervise when necessary  - Involve family in performance of ADLs  - Assess for home care needs following discharge   - Consider OT consult to assist with ADL evaluation and planning for discharge  - Provide patient education as appropriate  Outcome: Progressing     Problem: Prexisting or High Potential for Compromised Skin Integrity  Goal: Skin integrity is maintained or improved  Description: INTERVENTIONS:  - Identify patients at risk for skin breakdown  - Assess and monitor skin integrity  - Assess and monitor nutrition and hydration status  - Monitor labs   - Assess for incontinence   - Turn and reposition patient  - Assist with mobility/ambulation  - Relieve pressure over bony prominences  - Avoid friction and shearing  - Provide appropriate hygiene as needed including keeping skin clean and dry  - Evaluate need for skin moisturizer/barrier cream  - Collaborate with interdisciplinary team   - Patient/family teaching  - Consider wound care consult   Outcome: Progressing     Problem: Nutrition/Hydration-ADULT  Goal: Nutrient/Hydration intake appropriate for improving, restoring or maintaining nutritional needs  Description: Monitor and assess patient's nutrition/hydration status for malnutrition. Collaborate with interdisciplinary team and initiate plan and interventions as ordered.   Monitor patient's weight and dietary intake as ordered or per policy. Utilize nutrition screening tool and intervene as necessary. Determine patient's food preferences and provide high-protein, high-caloric foods as appropriate.      INTERVENTIONS:  - Monitor oral intake, urinary output, labs, and treatment plans  - Assess nutrition and hydration status and recommend course of action  - Evaluate amount of meals eaten  - Assist patient with eating if necessary   - Allow adequate time for meals  - Recommend/ encourage appropriate diets, oral nutritional supplements, and vitamin/mineral supplements  - Order, calculate, and assess calorie counts as needed  - Recommend, monitor, and adjust tube feedings and TPN/PPN based on assessed needs  - Assess need for intravenous fluids  - Provide specific nutrition/hydration education as appropriate  - Include patient/family/caregiver in decisions related to nutrition  Outcome: Progressing     Problem: PAIN - ADULT  Goal: Verbalizes/displays adequate comfort level or baseline comfort level  Description: Interventions:  - Encourage patient to monitor pain and request assistance  - Assess pain using appropriate pain scale  - Administer analgesics based on type and severity of pain and evaluate response  - Implement non-pharmacological measures as appropriate and evaluate response  - Consider cultural and social influences on pain and pain management  - Notify physician/advanced practitioner if interventions unsuccessful or patient reports new pain  Outcome: Progressing     Problem: INFECTION - ADULT  Goal: Absence or prevention of progression during hospitalization  Description: INTERVENTIONS:  - Assess and monitor for signs and symptoms of infection  - Monitor lab/diagnostic results  - Monitor all insertion sites, i.e. indwelling lines, tubes, and drains  - Monitor endotracheal if appropriate and nasal secretions for changes in amount and color  - [place] appropriate cooling/warming therapies per order  - Administer medications as ordered  - Instruct and encourage patient and family to use good hand hygiene technique  - Identify and instruct in appropriate isolation precautions for identified infection/condition  Outcome: Progressing     Problem: SAFETY ADULT  Goal: Maintain or return to baseline ADL function  Description: INTERVENTIONS:  -  Assess patient's ability to carry out ADLs; assess patient's baseline for ADL function and identify physical deficits which impact ability to perform ADLs (bathing, care of mouth/teeth, toileting, grooming, dressing, etc.)  - Assess/evaluate cause of self-care deficits   - Assess range of motion  - Assess patient's mobility; develop plan if impaired  - Assess patient's need for assistive devices and provide as appropriate  - Encourage maximum independence but intervene and supervise when necessary  - Involve family in performance of ADLs  - Assess for home care needs following discharge   - Consider OT consult to assist with ADL evaluation and planning for discharge  - Provide patient education as appropriate  Outcome: Progressing     Problem: DISCHARGE PLANNING  Goal: Discharge to home or other facility with appropriate resources  Description: INTERVENTIONS:  - Identify barriers to discharge w/patient and caregiver  - Arrange for needed discharge resources and transportation as appropriate  - Identify discharge learning needs (meds, wound care, etc.)  - Arrange for interpretive services to assist at discharge as needed  - Refer to Case Management Department for coordinating discharge planning if the patient needs post-hospital services based on physician/advanced practitioner order or complex needs related to functional status, cognitive ability, or social support system  Outcome: Progressing     Problem: Knowledge Deficit  Goal: Patient/family/caregiver demonstrates understanding of disease process, treatment plan, medications, and discharge instructions  Description: Complete learning assessment and assess knowledge base.   Interventions:  - Provide teaching at level of understanding  - Provide teaching via preferred learning methods  Outcome: Progressing     Problem: RESPIRATORY - ADULT  Goal: Achieves optimal ventilation and oxygenation  Description: INTERVENTIONS:  - Assess for changes in respiratory status  - Assess for changes in mentation and behavior  - Position to facilitate oxygenation and minimize respiratory effort  - Oxygen administered by appropriate delivery if ordered  - Initiate smoking cessation education as indicated  - Encourage broncho-pulmonary hygiene including cough, deep breathe, Incentive Spirometry  - Assess the need for suctioning and aspirate as needed  - Assess and instruct to report SOB or any respiratory difficulty  - Respiratory Therapy support as indicated  Outcome: Progressing     Problem: GENITOURINARY - ADULT  Goal: Maintains or returns to baseline urinary function  Description: INTERVENTIONS:  - Assess urinary function  - Encourage oral fluids to ensure adequate hydration if ordered  - Administer IV fluids as ordered to ensure adequate hydration  - Administer ordered medications as needed  - Offer frequent toileting  - Follow urinary retention protocol if ordered  Outcome: Progressing     Problem: METABOLIC, FLUID AND ELECTROLYTES - ADULT  Goal: Electrolytes maintained within normal limits  Description: INTERVENTIONS:  - Monitor labs and assess patient for signs and symptoms of electrolyte imbalances  - Administer electrolyte replacement as ordered  - Monitor response to electrolyte replacements, including repeat lab results as appropriate  - Instruct patient on fluid and nutrition as appropriate  Outcome: Progressing

## 2023-11-21 NOTE — ASSESSMENT & PLAN NOTE
Wt Readings from Last 3 Encounters:   11/21/23 73.8 kg (162 lb 11.2 oz)   09/07/23 74.6 kg (164 lb 6.4 oz)   08/30/23 73.3 kg (161 lb 9.6 oz)     Lab Results   Component Value Date    LVEF 60 08/28/2023     (H) 10/28/2023     (H) 09/29/2023     Echo 8/28/23: LVEF 60%.        Plan:  K > 4   Measure I/O

## 2023-11-21 NOTE — ASSESSMENT & PLAN NOTE
Lab Results   Component Value Date    EGFR 41 11/21/2023    EGFR 44 11/19/2023    EGFR 35 11/18/2023    CREATININE 1.31 (H) 11/21/2023    CREATININE 1.24 11/19/2023    CREATININE 1.50 (H) 11/18/2023     Baseline Cr between 0.75-1.1  Initial HERIBERTO felt 2/2 prerenal azotemia 2/2 diuresis, reduced PO intake +/- ATN. Patient received 2 doses of Bumex IV 2 g as she had not been responding appropriately to IV 40 Lasix daily. Creatinine went up by 0.5 meeting criteria for an HERIBERTO. Suspect most likely due to medications as a combination of prerenal and intrinsic. FENa was 0.2%, UA shows no casts or signs of infection, urine eosinophils 0%. Patient likely has prerenal HERIBERTO from volume depletion. Received 2 L NS and 1 pRBC and cr went up by 0.07 still and Na and Cl went down, suspect that volume prevented further cr rise and free water excretion impaired by kidney function, allowing hyponatremia to increase. Suspect that now that nephrotoxic medications have been stopped she responded well to Lasix and creatinine downtrended. HERIBERTO now resolved. Will be cautious about nephrotoxins and monitor as restarting EPOCH and ppx. Patient gets volume depleted and overloaded very easily. Especially from chemo. 10/19 Heriberto as cr went up by 0.3 in 48 hours from 0.82 on 10/17 to 1.22 on 10/19. Suspect this is prerenal hypovolemic, will see if patient improves with fluids. She gets overloaded easily so if no improvement suspect CRS again. 10/29 HERIBERTO cr went up by 0.3 again. She has 922 ml UOP in last 24 hours. HERIBERTO likely from lasix 40mg IV given yesterday. She is not hypovolemic.    11/17 HERIBERTO creatinine up to 1.8. Isolyte bolus 500 given. Plan:   Creatinine up trended to 1.31 today  Continue to monitor UO/renal function. Avoid nephrotoxic agents  Follow up with BMP in the morning   Continue to monitor a.m. BMP.

## 2023-11-21 NOTE — ASSESSMENT & PLAN NOTE
Malnutrition Findings:   Adult Malnutrition type: Chronic illness  Adult Degree of Malnutrition: Unspecified protein calorie malnutrition  Malnutrition Characteristics: Inadequate energy, Other (comment) (stress of illness)                  360 Statement: Protein calorie malnutrition r/t inadequate intake as evidenced by >7 day period of poor intakes and stress of infection; currently treated with regular diet and nutrition supplements    BMI Findings: Body mass index is 31.12 kg/m².

## 2023-11-21 NOTE — ASSESSMENT & PLAN NOTE
Cuffless trach placed 9/17, transitioned to cuffed on 10/3. Oxygen requirements not improving. For mental health patient is on buspirone, quetiapine, and sertraline. For pain, gabapentin, oxycodone scheduled and prn, prn oxycodone mainly utilized for increased pain during chemo and after a bronch. On Guaifenesin, Atrovent and Xopenex for airway maintenance. No improvement in bilateral consolidation or atelectasis and groundglass opacities on repeat CT and chest x-rays. Bronchial cx with 3 colonies CoNS. CTCAP 10/12:  indicates additional groundglass opacity with paraseptal emphysema, which may be contributing to inability to remain off vent. Bronchoscopy performed and unremarkable. Samples sent to lab for culture. Patient was placed back on vent s/p procedure. Now on high flow. Completed 7 day course of cefepime and Vancomycin. Bronchial culture and gram stain negative. Micro negative for CMV, AFB, Fungitell, Pneumocystis jiroveci (carinii), Histoplasma, Aspergillus. Trach secretions sent for sputum culture shows 2+ polys, 1+ gram-positive cocci in pairs, chains and clusters and 1+ gram-negative rods. 11/14 Discussion regarding possibilities of discharge done with family. Family shows understanding of patient's condition. 11/15 Trach is capped and patient is placed on 3 L of NC briefly for 2 days. Patient is back on trach collar at high flow and NC. Cuffless trach was placed 9/17. Replaced with a cuffed Shiley XLT #7 10/3  Pt was on BiPAP via vent for the past 2 nights, tolerating it well   Currently on HFNC on 50 L of oxygen     ECHO 11/21/23 - EF 65%, no patent foramen ovale      11/22 - As per plan, pt was on trach collar with FiO2 at 50%, however pt 2 episodes of hypoxia without any trigger. The episode was associated with tachypnea and dyspnea, SpO2 dropping as low as 50% for 20 mins.  Suctioning, positional changes, coughing, were done in attempt to increase SpO2 however had no effect on the pt's condition. FiO2 was increased from 50% to 70% and eventually to 100%, which increased the SpO2 to 85%. Pt is currently on non breather mask. Plan:  Continue to titrate O2 to maintain SpO2 >85%  Aggressive pulmonary toilet   Incentive Spirometry   Suctioning via trach if patient is desatting and unable to expectorate phlegm.   Continue inhalation treatment with Pulmicort and formoterol q12 , duonebs QID, atrovent and xopenex   Regular diet  Daily PT/OT  Follow up with the CXR

## 2023-11-21 NOTE — ASSESSMENT & PLAN NOTE
Cuffless trach placed 9/17, transitioned to cuffed on 10/3. Oxygen requirements not improving. For mental health patient is on buspirone, quetiapine, and sertraline. For pain, gabapentin, oxycodone scheduled and prn, prn oxycodone mainly utilized for increased pain during chemo and after a bronch. On Guaifenesin, Atrovent and Xopenex for airway maintenance. No improvement in bilateral consolidation or atelectasis and groundglass opacities on repeat CT and chest x-rays. Bronchial cx with 3 colonies CoNS. CTCAP 10/12:  indicates additional groundglass opacity with paraseptal emphysema, which may be contributing to inability to remain off vent. Bronchoscopy performed and unremarkable. Samples sent to lab for culture. Patient was placed back on vent s/p procedure. Now on high flow. Completed 7 day course of cefepime and Vancomycin. Bronchial culture and gram stain negative. Micro negative for CMV, AFB, Fungitell, Pneumocystis jiroveci (carinii), Histoplasma, Aspergillus. Trach secretions sent for sputum culture shows 2+ polys, 1+ gram-positive cocci in pairs, chains and clusters and 1+ gram-negative rods. 11/14 Discussion regarding possibilities of discharge done with family. Family shows understanding of patient's condition. 11/15 Trach is capped and patient is placed on 3 L of NC briefly for 2 days. Patient is back on trach collar at high flow and NC. Cuffless trach was placed 9/17. Replaced with a cuffed Shiley XLT #7 10/3  Pt was on BiPAP via vent for the past 2 nights, tolerating it well   Currently on HFNC on 50 L of oxygen     Plan:  Continue oxygen via NC and trial trach collar downtitration. continue nocturanl BiPAP EPAP 8, PSV 4   Suctioning via trach if patient is desatting and unable to expectorate phlegm.   Continue inhalation treatment with Pulmicort and formoterol q12 , duonebs QID, atrovent and xopenex   Regular diet  Daily PT/OT

## 2023-11-21 NOTE — PROGRESS NOTES
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 Oxygen requirements not improving. For mental health patient is on buspirone, quetiapine, and sertraline. For pain, gabapentin, oxycodone scheduled and prn, prn oxycodone mainly utilized for increased pain during chemo and after a bronch. On Guaifenesin, Atrovent and Xopenex for airway maintenance. No improvement in bilateral consolidation or atelectasis and groundglass opacities on repeat CT and chest x-rays. Bronchial cx with 3 colonies CoNS. CTCAP 10/12:  indicates additional groundglass opacity with paraseptal emphysema, which may be contributing to inability to remain off vent. Bronchoscopy performed and unremarkable. Samples sent to lab for culture. Patient was placed back on vent s/p procedure. Now on high flow. Completed 7 day course of cefepime and Vancomycin. Bronchial culture and gram stain negative. Micro negative for CMV, AFB, Fungitell, Pneumocystis jiroveci (carinii), Histoplasma, Aspergillus. Trach secretions sent for sputum culture shows 2+ polys, 1+ gram-positive cocci in pairs, chains and clusters and 1+ gram-negative rods. 11/14 Discussion regarding possibilities of discharge done with family. Family shows understanding of patient's condition. 11/15 Trach is capped and patient is placed on 3 L of NC briefly for 2 days. Patient is back on trach collar at high flow and NC. Cuffless trach was placed 9/17.  Replaced with a cuffed Shiley XLT #7 10/3  Pt was on BiPAP via vent for the past 2 nights, tolerating it well   Currently on HFNC on 50 L of oxygen     Plan:  Continue oxygen via NC and trial trach collar downtitration. continue nocturanl BiPAP EPAP 8, PSV 4   Suctioning via trach if patient is desatting and unable to expectorate phlegm. Continue inhalation treatment with Pulmicort and formoterol q12 , duonebs QID, atrovent and xopenex   Regular diet  Daily PT/OT  Follow up with the CXR and ECHO                Disposition: Stepdown Level 1    ICU Core Measures     Vented Patient  VAP Bundle  VAP bundle ordered     A: Assess, Prevent, and Manage Pain Has pain been assessed? Yes  Need for changes to pain regimen? No   B: Both Spontaneous Awakening Trials (SATs) and Spontaneous Breathing Trials (SBTs)   Plan to perform spontaneous breathing trial today? Yes   Obvious barriers to extubation? NA   C: Choice of Sedation RASS Goal: 0 Alert and Calm  Need for changes to sedation or analgesia regimen? No   D: Delirium CAM-ICU: Negative   E: Early Mobility  Plan for early mobility? NA   F: Family Engagement Plan for family engagement today? Yes       Review of Invasive Devices:      Central access plan: Hemodynamic monitoring      Prophylaxis:  VTE VTE covered by:  apixaban, Oral, 10 mg at 11/20/23 2112  [START ON 11/22/2023] apixaban, Oral       Stress Ulcer  covered byfamotidine (PEPCID) tablet 20 mg [[id number]]         Significant 24hr Events     24hr events: Pt was on HFNC, also tolerated BiPAP via vent last night. No desaturation overnight. Subjective   Review of Systems   Constitutional: Negative. HENT:  Positive for voice change. Eyes: Negative. Respiratory:  Positive for shortness of breath. Cardiovascular: Negative. Gastrointestinal: Negative. Endocrine: Negative. Genitourinary: Negative. Musculoskeletal: Negative. Skin: Negative. Allergic/Immunologic: Negative. Neurological: Negative. Hematological: Negative. Psychiatric/Behavioral: Negative.         Objective                            Vitals I/O      Most Recent Min/Max in 24hrs   Temp 98 °F (36.7 °C) Temp  Min: 98 °F (36.7 °C)  Max: 98.8 °F (37.1 °C)   Pulse 78 Pulse  Min: 75  Max: 86   Resp 22 Resp  Min: 16  Max: 22   BP 90/52 BP  Min: 90/52  Max: 109/58   O2 Sat 100 % SpO2  Min: 91 %  Max: 100 %      Intake/Output Summary (Last 24 hours) at 11/21/2023 0730  Last data filed at 11/20/2023 2127  Gross per 24 hour   Intake 630 ml   Output 325 ml   Net 305 ml       Diet Regular; Regular House    Invasive Monitoring           Physical Exam   Physical Exam  Eyes:      General: Vision grossly intact. Extraocular Movements: Extraocular movements intact. Conjunctiva/sclera: Conjunctivae normal.      Pupils: Pupils are equal, round, and reactive to light. Skin:     General: Skin is warm. HENT:      Head: Normocephalic and atraumatic. Right Ear: Hearing normal.      Left Ear: Hearing normal.      Mouth/Throat:      Mouth: Mucous membranes are moist.   Neck:      Trachea: Tracheostomy present. Cardiovascular:      Rate and Rhythm: Normal rate and regular rhythm. Pulses: Normal pulses. Heart sounds: Normal heart sounds. Musculoskeletal:         General: Normal range of motion. Abdominal:      Palpations: Abdomen is soft. Constitutional:       Appearance: She is ill-appearing. Pulmonary:      Effort: Pulmonary effort is normal.      Breath sounds: Normal breath sounds. Comments: Decrease breathing sounds at the base   Neurological:      General: No focal deficit present. Mental Status: She is alert and oriented to person, place and time. Mental status is at baseline. Diagnostic Studies      Imaging: Chest X ray I have personally reviewed pertinent reports.        Medications:  Scheduled PRN   amLODIPine, 10 mg, Daily  apixaban, 10 mg, BID  [START ON 11/22/2023] apixaban, 5 mg, BID  budesonide, 0.5 mg, Q12H  busPIRone, 30 mg, TID  chlorhexidine, 15 mL, Q12H ASHLEY  famotidine, 20 mg, BID  folic acid, 1 mg, Daily  formoterol, 20 mcg, Q12H  gabapentin, 300 mg, TID  levalbuterol, 1.25 mg, Q6H   And  ipratropium, 0.5 mg, Q6H  melatonin, 3 mg, HS  metoprolol tartrate, 25 mg, Q12H ASHLEY  nicotine, 7 mg, Daily  oxyCODONE, 5 mg, Q8H  polyethylene glycol, 17 g, Daily  QUEtiapine, 50 mg, HS  senna, 17.6 mg, BID  sertraline, 75 mg, Daily  sodium chloride, 4 mL, TID      alteplase, 2 mg, Q1MIN PRN  alteplase, 2 mg, Q1MIN PRN  alteplase, 2 mg, Q1MIN PRN  ondansetron, 4 mg, Q8H PRN  oxyCODONE, 2.5 mg, Q6H PRN       Continuous          Labs:    CBC    Recent Labs     11/21/23  0507   WBC 5.32   HGB 7.7*   HCT 26.1*        BMP    Recent Labs     11/21/23  0507   SODIUM 143   K 3.9      CO2 31   AGAP 6   BUN 20   CREATININE 1.31*   CALCIUM 9.1       Coags    No recent results     Additional Electrolytes  No recent results       Blood Gas    Recent Labs     11/19/23  1302   PHART 7.390   HOB9ELO 48.5*   PO2ART 70.5*   VIL6QRJ 28.7*   BEART 3.1   SOURCE Radial, Right     Recent Labs     11/19/23  1302   SOURCE Radial, Right    LFTs  No recent results    Infectious  No recent results  Glucose  Recent Labs     11/21/23  0507   GLUC 82                   Nora Jain MD

## 2023-11-22 ENCOUNTER — APPOINTMENT (INPATIENT)
Dept: CT IMAGING | Facility: HOSPITAL | Age: 68
DRG: 004 | End: 2023-11-22
Payer: COMMERCIAL

## 2023-11-22 ENCOUNTER — APPOINTMENT (INPATIENT)
Dept: RADIOLOGY | Facility: HOSPITAL | Age: 68
DRG: 004 | End: 2023-11-22
Payer: COMMERCIAL

## 2023-11-22 LAB
ANION GAP SERPL CALCULATED.3IONS-SCNC: 5 MMOL/L
BASOPHILS # BLD AUTO: 0.05 THOUSANDS/ÂΜL (ref 0–0.1)
BASOPHILS NFR BLD AUTO: 1 % (ref 0–1)
BUN SERPL-MCNC: 18 MG/DL (ref 5–25)
CALCIUM SERPL-MCNC: 9.7 MG/DL (ref 8.4–10.2)
CHLORIDE SERPL-SCNC: 107 MMOL/L (ref 96–108)
CO2 SERPL-SCNC: 31 MMOL/L (ref 21–32)
CREAT SERPL-MCNC: 1.33 MG/DL (ref 0.6–1.3)
EOSINOPHIL # BLD AUTO: 0.08 THOUSAND/ÂΜL (ref 0–0.61)
EOSINOPHIL NFR BLD AUTO: 1 % (ref 0–6)
ERYTHROCYTE [DISTWIDTH] IN BLOOD BY AUTOMATED COUNT: 16.4 % (ref 11.6–15.1)
GFR SERPL CREATININE-BSD FRML MDRD: 40 ML/MIN/1.73SQ M
GLUCOSE SERPL-MCNC: 116 MG/DL (ref 65–140)
HCT VFR BLD AUTO: 27.1 % (ref 34.8–46.1)
HGB BLD-MCNC: 8 G/DL (ref 11.5–15.4)
IMM GRANULOCYTES # BLD AUTO: 0.13 THOUSAND/UL (ref 0–0.2)
IMM GRANULOCYTES NFR BLD AUTO: 2 % (ref 0–2)
LYMPHOCYTES # BLD AUTO: 0.98 THOUSANDS/ÂΜL (ref 0.6–4.47)
LYMPHOCYTES NFR BLD AUTO: 15 % (ref 14–44)
MCH RBC QN AUTO: 29.2 PG (ref 26.8–34.3)
MCHC RBC AUTO-ENTMCNC: 29.5 G/DL (ref 31.4–37.4)
MCV RBC AUTO: 99 FL (ref 82–98)
MONOCYTES # BLD AUTO: 0.82 THOUSAND/ÂΜL (ref 0.17–1.22)
MONOCYTES NFR BLD AUTO: 12 % (ref 4–12)
NEUTROPHILS # BLD AUTO: 4.66 THOUSANDS/ÂΜL (ref 1.85–7.62)
NEUTS SEG NFR BLD AUTO: 69 % (ref 43–75)
NRBC BLD AUTO-RTO: 0 /100 WBCS
PLATELET # BLD AUTO: 222 THOUSANDS/UL (ref 149–390)
PMV BLD AUTO: 9.3 FL (ref 8.9–12.7)
POTASSIUM SERPL-SCNC: 4.2 MMOL/L (ref 3.5–5.3)
RBC # BLD AUTO: 2.74 MILLION/UL (ref 3.81–5.12)
SODIUM SERPL-SCNC: 143 MMOL/L (ref 135–147)
WBC # BLD AUTO: 6.72 THOUSAND/UL (ref 4.31–10.16)

## 2023-11-22 PROCEDURE — 94640 AIRWAY INHALATION TREATMENT: CPT

## 2023-11-22 PROCEDURE — 71045 X-RAY EXAM CHEST 1 VIEW: CPT

## 2023-11-22 PROCEDURE — 94150 VITAL CAPACITY TEST: CPT

## 2023-11-22 PROCEDURE — 71250 CT THORAX DX C-: CPT

## 2023-11-22 PROCEDURE — 85025 COMPLETE CBC W/AUTO DIFF WBC: CPT

## 2023-11-22 PROCEDURE — NC001 PR NO CHARGE: Performed by: INTERNAL MEDICINE

## 2023-11-22 PROCEDURE — 99291 CRITICAL CARE FIRST HOUR: CPT | Performed by: PHYSICIAN ASSISTANT

## 2023-11-22 PROCEDURE — 94760 N-INVAS EAR/PLS OXIMETRY 1: CPT

## 2023-11-22 PROCEDURE — 80048 BASIC METABOLIC PNL TOTAL CA: CPT

## 2023-11-22 PROCEDURE — G1004 CDSM NDSC: HCPCS

## 2023-11-22 RX ORDER — SULFAMETHOXAZOLE AND TRIMETHOPRIM 800; 160 MG/1; MG/1
1 TABLET ORAL 3 TIMES WEEKLY
Status: DISCONTINUED | OUTPATIENT
Start: 2023-11-22 | End: 2023-11-22 | Stop reason: SDUPTHER

## 2023-11-22 RX ORDER — SULFAMETHOXAZOLE AND TRIMETHOPRIM 800; 160 MG/1; MG/1
1 TABLET ORAL 3 TIMES WEEKLY
Status: DISCONTINUED | OUTPATIENT
Start: 2023-11-24 | End: 2023-12-04 | Stop reason: HOSPADM

## 2023-11-22 RX ORDER — FUROSEMIDE 10 MG/ML
40 INJECTION INTRAMUSCULAR; INTRAVENOUS ONCE
Status: COMPLETED | OUTPATIENT
Start: 2023-11-22 | End: 2023-11-22

## 2023-11-22 RX ORDER — ACYCLOVIR 200 MG/1
400 CAPSULE ORAL 2 TIMES DAILY
Status: DISCONTINUED | OUTPATIENT
Start: 2023-11-22 | End: 2023-11-22

## 2023-11-22 RX ORDER — ACYCLOVIR 200 MG/1
400 CAPSULE ORAL 2 TIMES DAILY
Status: DISCONTINUED | OUTPATIENT
Start: 2023-11-24 | End: 2023-12-04 | Stop reason: HOSPADM

## 2023-11-22 RX ORDER — PREDNISONE 50 MG/1
60 TABLET ORAL 2 TIMES DAILY WITH MEALS
Status: COMPLETED | OUTPATIENT
Start: 2023-11-24 | End: 2023-11-28

## 2023-11-22 RX ORDER — FLUCONAZOLE 100 MG/1
200 TABLET ORAL DAILY
Status: DISCONTINUED | OUTPATIENT
Start: 2023-11-24 | End: 2023-12-04 | Stop reason: HOSPADM

## 2023-11-22 RX ORDER — FLUCONAZOLE 100 MG/1
400 TABLET ORAL DAILY
Status: DISCONTINUED | OUTPATIENT
Start: 2023-11-24 | End: 2023-11-22

## 2023-11-22 RX ORDER — ONDANSETRON 2 MG/ML
4 INJECTION INTRAMUSCULAR; INTRAVENOUS EVERY 8 HOURS PRN
Status: DISCONTINUED | OUTPATIENT
Start: 2023-11-24 | End: 2023-12-04 | Stop reason: HOSPADM

## 2023-11-22 RX ORDER — LEVOFLOXACIN 250 MG/1
250 TABLET, FILM COATED ORAL
Status: DISCONTINUED | OUTPATIENT
Start: 2023-11-24 | End: 2023-12-04 | Stop reason: HOSPADM

## 2023-11-22 RX ORDER — ACYCLOVIR 200 MG/1
400 CAPSULE ORAL 2 TIMES DAILY
Status: CANCELLED | OUTPATIENT
Start: 2023-11-24

## 2023-11-22 RX ORDER — SODIUM CHLORIDE 9 MG/ML
20 INJECTION, SOLUTION INTRAVENOUS DAILY PRN
Status: DISPENSED | OUTPATIENT
Start: 2023-11-24 | End: 2023-11-29

## 2023-11-22 RX ORDER — LEVOFLOXACIN 250 MG/1
500 TABLET, FILM COATED ORAL
Status: DISCONTINUED | OUTPATIENT
Start: 2023-11-24 | End: 2023-11-22

## 2023-11-22 RX ADMIN — FAMOTIDINE 20 MG: 20 TABLET, FILM COATED ORAL at 17:33

## 2023-11-22 RX ADMIN — LEVALBUTEROL HYDROCHLORIDE 1.25 MG: 1.25 SOLUTION RESPIRATORY (INHALATION) at 19:46

## 2023-11-22 RX ADMIN — BUDESONIDE 0.5 MG: 0.5 INHALANT ORAL at 19:46

## 2023-11-22 RX ADMIN — NICOTINE 7 MG: 7 PATCH, EXTENDED RELEASE TRANSDERMAL at 08:16

## 2023-11-22 RX ADMIN — AMLODIPINE BESYLATE 10 MG: 5 TABLET ORAL at 08:14

## 2023-11-22 RX ADMIN — OXYCODONE HYDROCHLORIDE 5 MG: 5 SOLUTION ORAL at 19:33

## 2023-11-22 RX ADMIN — QUETIAPINE FUMARATE 50 MG: 25 TABLET ORAL at 21:02

## 2023-11-22 RX ADMIN — ONDANSETRON 4 MG: 2 INJECTION INTRAMUSCULAR; INTRAVENOUS at 20:09

## 2023-11-22 RX ADMIN — BUDESONIDE 0.5 MG: 0.5 INHALANT ORAL at 08:35

## 2023-11-22 RX ADMIN — LEVALBUTEROL HYDROCHLORIDE 1.25 MG: 1.25 SOLUTION RESPIRATORY (INHALATION) at 01:08

## 2023-11-22 RX ADMIN — FAMOTIDINE 20 MG: 20 TABLET, FILM COATED ORAL at 08:14

## 2023-11-22 RX ADMIN — LEVALBUTEROL HYDROCHLORIDE 1.25 MG: 1.25 SOLUTION RESPIRATORY (INHALATION) at 13:49

## 2023-11-22 RX ADMIN — GABAPENTIN 300 MG: 300 CAPSULE ORAL at 20:14

## 2023-11-22 RX ADMIN — FORMOTEROL FUMARATE DIHYDRATE 20 MCG: 20 SOLUTION RESPIRATORY (INHALATION) at 19:46

## 2023-11-22 RX ADMIN — SERTRALINE 75 MG: 25 TABLET, FILM COATED ORAL at 08:14

## 2023-11-22 RX ADMIN — FUROSEMIDE 40 MG: 10 INJECTION, SOLUTION INTRAMUSCULAR; INTRAVENOUS at 09:20

## 2023-11-22 RX ADMIN — IPRATROPIUM BROMIDE 0.5 MG: 0.5 SOLUTION RESPIRATORY (INHALATION) at 13:49

## 2023-11-22 RX ADMIN — IPRATROPIUM BROMIDE 0.5 MG: 0.5 SOLUTION RESPIRATORY (INHALATION) at 08:35

## 2023-11-22 RX ADMIN — FOLIC ACID 1 MG: 1 TABLET ORAL at 08:14

## 2023-11-22 RX ADMIN — IPRATROPIUM BROMIDE 0.5 MG: 0.5 SOLUTION RESPIRATORY (INHALATION) at 01:08

## 2023-11-22 RX ADMIN — BUSPIRONE HYDROCHLORIDE 30 MG: 10 TABLET ORAL at 17:33

## 2023-11-22 RX ADMIN — IPRATROPIUM BROMIDE 0.5 MG: 0.5 SOLUTION RESPIRATORY (INHALATION) at 19:46

## 2023-11-22 RX ADMIN — APIXABAN 10 MG: 5 TABLET, FILM COATED ORAL at 08:14

## 2023-11-22 RX ADMIN — CHLORHEXIDINE GLUCONATE 15 ML: 1.2 SOLUTION ORAL at 20:14

## 2023-11-22 RX ADMIN — BUSPIRONE HYDROCHLORIDE 30 MG: 10 TABLET ORAL at 08:15

## 2023-11-22 RX ADMIN — CHLORHEXIDINE GLUCONATE 15 ML: 1.2 SOLUTION ORAL at 08:15

## 2023-11-22 RX ADMIN — OXYCODONE HYDROCHLORIDE 5 MG: 5 SOLUTION ORAL at 04:03

## 2023-11-22 RX ADMIN — METOPROLOL TARTRATE 25 MG: 25 TABLET, FILM COATED ORAL at 08:14

## 2023-11-22 RX ADMIN — BUSPIRONE HYDROCHLORIDE 30 MG: 10 TABLET ORAL at 21:01

## 2023-11-22 RX ADMIN — GABAPENTIN 300 MG: 300 CAPSULE ORAL at 17:33

## 2023-11-22 RX ADMIN — LEVALBUTEROL HYDROCHLORIDE 1.25 MG: 1.25 SOLUTION RESPIRATORY (INHALATION) at 08:35

## 2023-11-22 RX ADMIN — OXYCODONE HYDROCHLORIDE 5 MG: 5 SOLUTION ORAL at 12:10

## 2023-11-22 RX ADMIN — GABAPENTIN 300 MG: 300 CAPSULE ORAL at 08:14

## 2023-11-22 RX ADMIN — METOPROLOL TARTRATE 25 MG: 25 TABLET, FILM COATED ORAL at 20:15

## 2023-11-22 RX ADMIN — MELATONIN 3 MG: at 21:01

## 2023-11-22 RX ADMIN — APIXABAN 5 MG: 5 TABLET, FILM COATED ORAL at 20:14

## 2023-11-22 RX ADMIN — FORMOTEROL FUMARATE DIHYDRATE 20 MCG: 20 SOLUTION RESPIRATORY (INHALATION) at 08:35

## 2023-11-22 NOTE — ASSESSMENT & PLAN NOTE
Large B-cell lymphoma, stage II. First chemo cycle 9/22 4 days of R-EPOCH. 9/27 Rituxan. 9/28 Filgrastim. Received nivestym 300 mc 7 days dcd on 10/26   Discontinue Prophylaxis as per Heme Oncology recommendations     9/22  4 days of R-EPOCH.  9/27 Rituxan  10/13 for [facility] cycle 2, which was done over 4 days and cytoxan on 10/19.  11/6 completed R-EPOCH cycle 3 and Cytoxan 11/7. Plan:  CT neck w wo contrast 11/15 (showed IJ thrombus as above)  Pt will receive her chemotherapy on 11/24 as scheduled regardless of PET scan   Transfuse blood products as needed.   Keep Hgb>7 and Plt>20 for chemo   See acute respiratory failure above

## 2023-11-22 NOTE — ASSESSMENT & PLAN NOTE
Recent Labs     11/21/23  0507   HGB 7.7*       '  Patient received 1 PRBC transfusion on 10/5 and 10/25. Her B/L hemoglobin is 12-14. No sites of bleeding, no black stools. Iron panel indicative of inflammatory anemia. Normal Vitamin B12 levels. Folate low at 5.5. Filgrastim 4 doses administered by heme/onc. Plan:  Trend hemoglobin and transfuse for <7. As per heme/onc transfuse irradiated and leukoreduced blood products. Trend platelets  Folic acid 1mg daily supplementation  As per conversation with Heme/oncology attending, patient pancytopenia associated with chemotherapy is expected/predictable. No further anemia w/u required.   Low ANC management as per heme/onc

## 2023-11-22 NOTE — ASSESSMENT & PLAN NOTE
9/14 Neck/ soft tissue CT: Large enhancing soft tissue mass identified within the left neck involving multiple spaces. Centered within the left oropharynx with extensions as described above into multiple spaces. This results in significant airway compromise. 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 Small level 3/level 4 nodes are seen within the neck. Tracheostomy by ENT. Replaced on 9/26. H/o tobacco abuse, daily smoker. On nicotine while inpatient, confirmed with patient she needs nicotine patch. CT soft tissue neck shows reduced mass effect from 9/14 to 10/13. Can consider reducing trach size if continues to improve. Patient often refusing peg tube feeds but is feeding herself small meals orally. Plan:  ENT and heme onc following  Pt will be receiving her 4th cycle of chemotherapy on 11/24 regardless of her PET scan  As per speech therapist diet  Dysphagia 2. Continue parallel PEG tube feeds for nutrition. See acute respiratory failure and large B cell lymphoma above.

## 2023-11-22 NOTE — CASE MANAGEMENT
Case Management Progress Note    Patient name [facility]  Location ICU 03/ICU 03 MRN [id number]  : 1955 Date 2023       LOS (days): 71  Geometric Mean LOS (GMLOS) (days): 26.10  Days to GMLOS:-43        OBJECTIVE:        Current admission status: Inpatient  Preferred Pharmacy:   [id number] [place] - [address] [facility]. [address] [facility] [address]  Phone: [phone] Fax: [address] ([place] [address] [place] [address]  Phone: [phone] Fax: [phone]    Primary Care Provider: Mehran Jean Baptiste MD    Primary Insurance: [place]  Secondary Insurance: [facility]    PROGRESS NOTE:    Weekly Care Management Length of Stay Review     Current LOS: 69 Days    Most Recent Labs:     Lab Results   Component Value Date/Time    WBC 5.32 2023 05:07 AM    HGB 7.7 (L) 2023 05:07 AM    HCT 26.1 (L) 2023 05:07 AM     2023 05:07 AM    SODIUM 143 2023 05:07 AM    K 3.9 2023 05:07 AM     2023 05:07 AM    CO2 31 2023 05:07 AM    BUN 20 2023 05:07 AM    CREATININE 1.31 (H) 2023 05:07 AM    GLUC 82 2023 05:07 AM       Most Recent Vitals:   Vitals:    23 1552   BP:    Pulse:    Resp:    Temp:    SpO2: 93%        Identified Barriers to Discharge/Discharge Goals/Care Management Interventions: Trach/PEG, back on high, poss need for vent    Intended Discharge Disposition: family and patient want home.  Equipment will need to be set up    Expected Discharge Date: TBD

## 2023-11-22 NOTE — RESPIRATORY THERAPY NOTE
Trach care education preformed with Denise Alarcon daughter. Topics discussed- (TB= teach back, RD= return demonstration)  - Trach tie exchange- (TB, pointers being only being able to fit 2 fingers between tie and skin)  - Inner Cannula exchange- (TB/RD)  - Site care- including trach care cleaning and 4x4 gauze around trach- (TB, RD)  - Sterile suctioning- (TB/RD with pointers on sterile technique and less than 20's in trach)  - What to do if trach would become dislodged- (putting trach back in if able)   - Trach education on anatomy of patients trach- (Sample provided to daughter to play with)  - Oxygenation during trach- Watch for desaturations goal 85% and above)   - Trach cap/Passey education  - Trach cuff education- deflated/inflated    All daughter's questions were answered. Daughter felt better about education/care. Wants more practice with sterile suctioning and proper technique.

## 2023-11-22 NOTE — CASE MANAGEMENT
Case Management Progress Note    Patient name Burke Gonzalez  Location ICU 03/ICU 03 MRN [id number]  : 1955 Date 2023       LOS (days): 70  Geometric Mean LOS (GMLOS) (days): 26.10  Days to GMLOS:-43.8        OBJECTIVE:        Current admission status: Inpatient  Preferred Pharmacy:   [id number] [place] - [address] [facility]. [address] [facility] [place] [address]  Phone: [phone] Fax: [phone] ([place], [place] - [address]  [address] [facility] [address]  Phone: [phone] Fax: [phone]    Primary Care Provider: Seymour Cancino MD    Primary Insurance: [facility]  Secondary Insurance: [facility]    PROGRESS NOTE:    CM notified by [address] [place] [address] team that plan will be for pt to go home on O2 and the BiPap via Vent at night. CM reached out to [address] [place] [address] RT, Sanya Diaz. He will provide scripts to be completed by physician. Scripts obtained from Sanya Diaz for both Trach supplies and mechanical ventilator equipment order. Scripts provided to [address] [place] [address] team to be completed/signed. CM met with pt and her daughter, Alton Nielsen with CM MITCHEL Christianson at bedside. CM reviewed that scripts were obtained and once completed by CC team CM would send back to [address] [place] [address]. [facility] will reach out to [place] to complete home inspection, teaching, and set up. [place] aware that family will be taught on home vent care by the RT with [facility]. [facility] will assist with teaching on trach care. Alton Nielsen did review she started trach teaching with RT here at the hospital. Alton Nielsen did confirm 24/7 care between herself, pt's DIL, and family. Alton Nielsen has been in contract with pt's . Pt may need tube feeds upon d/c - will order once determination has been made. BE [facility] updated on plan of care. Aware CM will reach out to notify them of an anticipated dc date.      CM provided pt's daughter with list of options for a ramp for front entrance (pt has 3 steps).

## 2023-11-22 NOTE — PROGRESS NOTES
[facility]  Interval Progress Note: Critical Care  Name: Cat Godinez  MRN: [id number]  Unit/Bed#: ICU 03 I Date of Admission: 9/13/2023   Date of Service: 11/22/2023 I Hospital Day: 79    Interval Events:  Overnight had notable two abrupt episodes of spontaneous hypoxia which was associated with increased work of breathing and dyspnea. In one episode, SPO2 dropped into the high 50's and remained hypoxic for over 20 minutes. Attempted to increase SpO2 with suctioning, position change, coughing, encouraging deep breaths without change. Ultimately O2 sats remained critically low therefore fio2 increased from 50 to 70& and then ultimately 100%. SpO2 gradually improved to be above 85%. Pertinent New Data:   , SpO2 current 87% on 50% fio2 /62    Assessment and Plan  Acute hypoxic respiratory failure, suspected 2/2 atelectasis    Plan:  Continue to titrate O2 to maintain SPO2 >85%  Reduced length of circuit in an attempt to reduce dead air space  Aggressive pulmonary toilet  Incentive spirometry   Pulmicort, formoterol, xopenex/atrovent tid    Billing Level:  During this visit, Critical care services were medically necessary as this patient had a high probability of imminent or life-threatening deterioration due to acute respiratory failure, required my direct attention, intervention, and personal management to implement the following: bedside management. I have personally provided 35 minutes on 11/22/23 of critical care time, exclusive of procedures, teaching, family meetings, and any prior time recorded by providers other than myself.     SIGNATURE: Evelia Jay PA-C

## 2023-11-22 NOTE — ASSESSMENT & PLAN NOTE
Malnutrition Findings:   Adult Malnutrition type: Chronic illness  Adult Degree of Malnutrition: Unspecified protein calorie malnutrition  Malnutrition Characteristics: Inadequate energy, Other (comment) (stress of illness)                  360 Statement: Protein calorie malnutrition r/t inadequate intake as evidenced by >7 day period of poor intakes and stress of infection; currently treated with regular diet and nutrition supplements    BMI Findings: Body mass index is 32.12 kg/m².

## 2023-11-22 NOTE — PROGRESS NOTES
[facility]  Progress Note  Name: Lorie Goodman  MRN: [id number]  Unit/Bed#: ICU 03 I Date of Admission: 9/13/2023   Date of Service: 11/22/2023 I Hospital Day: 79    Assessment/Plan   * Acute respiratory failure with hypoxia secondary to oropharyngeal mass  Assessment & Plan  Cuffless trach placed 9/17, transitioned to cuffed on 10/3. Oxygen requirements not improving. For mental health patient is on buspirone, quetiapine, and sertraline. For pain, gabapentin, oxycodone scheduled and prn, prn oxycodone mainly utilized for increased pain during chemo and after a bronch. On Guaifenesin, Atrovent and Xopenex for airway maintenance. No improvement in bilateral consolidation or atelectasis and groundglass opacities on repeat CT and chest x-rays. Bronchial cx with 3 colonies CoNS. CTCAP 10/12:  indicates additional groundglass opacity with paraseptal emphysema, which may be contributing to inability to remain off vent. Bronchoscopy performed and unremarkable. Samples sent to lab for culture. Patient was placed back on vent s/p procedure. Now on high flow. Completed 7 day course of cefepime and Vancomycin. Bronchial culture and gram stain negative. Micro negative for CMV, AFB, Fungitell, Pneumocystis jiroveci (carinii), Histoplasma, Aspergillus. Trach secretions sent for sputum culture shows 2+ polys, 1+ gram-positive cocci in pairs, chains and clusters and 1+ gram-negative rods. 11/14 Discussion regarding possibilities of discharge done with family. Family shows understanding of patient's condition. 11/15 Trach is capped and patient is placed on 3 L of NC briefly for 2 days. Patient is back on trach collar at high flow and NC. Cuffless trach was placed 9/17.  Replaced with a cuffed Shiley XLT #7 10/3  Pt was on BiPAP via vent for the past 2 nights, tolerating it well   Currently on HFNC on 50 L of oxygen     ECHO 11/21/23 - EF 65%, no patent foramen ovale      11/22 - As per plan, pt was on trach collar with FiO2 at 50%, however pt 2 episodes of hypoxia without any trigger. The episode was associated with tachypnea and dyspnea, SpO2 dropping as low as 50% for 20 mins. Suctioning, positional changes, coughing, were done in attempt to increase SpO2 however had no effect on the pt's condition. FiO2 was increased from 50% to 70% and eventually to 100%, which increased the SpO2 to 85%. Pt is currently on non breather mask. Plan:  Continue to titrate O2 to maintain SpO2 >85%  Aggressive pulmonary toilet   Incentive Spirometry   Suctioning via trach if patient is desatting and unable to expectorate phlegm. Continue inhalation treatment with Pulmicort and formoterol q12 , duonebs QID, atrovent and xopenex   Regular diet  Daily PT/OT  Follow up with the CXR     Large cell lymphoma ([address])  Assessment & Plan  Large B-cell lymphoma, stage II. First chemo cycle 9/22 4 days of R-EPOCH. 9/27 Rituxan. 9/28 Filgrastim. Received nivestym 300 mc 7 days dcd on 10/26   Discontinue Prophylaxis as per Heme Oncology recommendations     9/22  4 days of R-EPOCH.  9/27 Rituxan  10/13 for [facility] cycle 2, which was done over 4 days and cytoxan on 10/19.  11/6 completed R-EPOCH cycle 3 and Cytoxan 11/7. Plan:  CT neck w wo contrast 11/15 (showed IJ thrombus as above)  Pt will receive her chemotherapy on 11/24 as scheduled regardless of PET scan   Transfuse blood products as needed. Keep Hgb>7 and Plt>20 for chemo   See acute respiratory failure above    Acute embolism and thrombosis of right internal jugular vein (HCC)  Assessment & Plan  CT soft tissues of the neck: Nonocclusive thrombus noted within the right internal jugular vein just above the Mediport entrance site into the internal jugular vein. No signs of pain, headaches, vision changes.     Plan:  Eliquis 5 mg     Oropharyngeal mass  Assessment & Plan  9/14 Neck/ soft tissue CT: Large enhancing soft tissue mass identified within the left neck involving multiple spaces. Centered within the left oropharynx with extensions as described above into multiple spaces. This results in significant airway compromise. 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 Small level 3/level 4 nodes are seen within the neck. Tracheostomy by ENT. Replaced on 9/26. H/o tobacco abuse, daily smoker. On nicotine while inpatient, confirmed with patient she needs nicotine patch. CT soft tissue neck shows reduced mass effect from 9/14 to 10/13. Can consider reducing trach size if continues to improve. Patient often refusing peg tube feeds but is feeding herself small meals orally. Plan:  ENT and heme onc following  Pt will be receiving her 4th cycle of chemotherapy on 11/24 regardless of her PET scan  As per speech therapist diet  Dysphagia 2. Continue parallel PEG tube feeds for nutrition. See acute respiratory failure and large B cell lymphoma above. Pancytopenia due to chemotherapy [facility])  Assessment & Plan  Recent Labs     11/21/23  0507   HGB 7.7*       '  Patient received 1 PRBC transfusion on 10/5 and 10/25. Her B/L hemoglobin is 12-14. No sites of bleeding, no black stools. Iron panel indicative of inflammatory anemia. Normal Vitamin B12 levels. Folate low at 5.5. Filgrastim 4 doses administered by heme/onc. Plan:  Trend hemoglobin and transfuse for <7. As per heme/onc transfuse irradiated and leukoreduced blood products. Trend platelets  Folic acid 1mg daily supplementation  As per conversation with Heme/oncology attending, patient pancytopenia associated with chemotherapy is expected/predictable. No further anemia w/u required. Low ANC management as per heme/onc    Blister (nonthermal) of left forearm, sequela  Assessment & Plan  Blisters of left upper extremity healing, no signs of infection, continue daily wound care.      (HFpEF) heart failure with preserved ejection fraction (HCC)  Assessment & Plan  Wt Readings from Last 3 Encounters: 11/22/23 74.6 kg (164 lb 7.4 oz)   09/07/23 74.6 kg (164 lb 6.4 oz)   08/30/23 73.3 kg (161 lb 9.6 oz)     Lab Results   Component Value Date    LVEF 65 11/21/2023     (H) 10/28/2023     (H) 09/29/2023     Echo 11/21/23: LVEF 65%. Plan:  K > 4   Measure I/O      Ambulatory dysfunction  Assessment & Plan  2/2 Impacted by chronic pain syndrome + prolonged hospital stay. Continue OOB with PT. PT rec post acute rehab however reportedly Cannot get LTACH placement while getting chemo per CM  She is aware STR SNFs continue to follow and treatment can be done from a SNF level of care. PT would need to be on trach collar for at least 72 hours with no need for the vent. Aware that pt would need to be around 28% FiO2     Depression  Assessment & Plan  Patient on Zoloft 75 daily and Seroquel hs  Patient is depressed, multiple family/palliative discussions. patient is firm that she wants to live and to fight, she is just tired. Currently goal is disease treatment oriented. Full code. No SI/HI ideations. Palliative signed off, will reconsult if patient changes her mind regarding wishes. Chronic Superficial thrombophlebitis  Assessment & Plan  Right forearm soreness. RUE US: No acute or chronic deep vein thrombosis. 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 PO not severe enough to require renal dosing at this time, will continue to monitor and adjust dosing as needed. Continue DVT ppx with Eliquis     CKD (chronic kidney disease) stage 3, GFR 30-59 ml/min [facility])  Assessment & Plan  Lab Results   Component Value Date    EGFR 41 11/21/2023    EGFR 44 11/19/2023    EGFR 35 11/18/2023    CREATININE 1.31 (H) 11/21/2023    CREATININE 1.24 11/19/2023    CREATININE 1.50 (H) 11/18/2023     Baseline Cr between 0.75-1.1  Initial PO felt 2/2 prerenal azotemia 2/2 diuresis, reduced PO intake +/- ATN.  Patient received 2 doses of Bumex IV 2 g as she had not been responding appropriately to IV 40 Lasix daily. Creatinine went up by 0.5 meeting criteria for an HERIBERTO. Suspect most likely due to medications as a combination of prerenal and intrinsic. FENa was 0.2%, UA shows no casts or signs of infection, urine eosinophils 0%. Patient likely has prerenal HERIBERTO from volume depletion. Received 2 L NS and 1 pRBC and cr went up by 0.07 still and Na and Cl went down, suspect that volume prevented further cr rise and free water excretion impaired by kidney function, allowing hyponatremia to increase. Suspect that now that nephrotoxic medications have been stopped she responded well to Lasix and creatinine downtrended. HERIBERTO now resolved. Will be cautious about nephrotoxins and monitor as restarting EPOCH and ppx. Patient gets volume depleted and overloaded very easily. Especially from chemo. 10/19 Heriberto as cr went up by 0.3 in 48 hours from 0.82 on 10/17 to 1.22 on 10/19. Suspect this is prerenal hypovolemic, will see if patient improves with fluids. She gets overloaded easily so if no improvement suspect CRS again. 10/29 HERIBERTO cr went up by 0.3 again. She has 922 ml UOP in last 24 hours. HERIBERTO likely from lasix 40mg IV given yesterday. She is not hypovolemic.    11/17 HERIBERTO creatinine up to 1.8. Isolyte bolus 500 given. Plan:   Creatinine up trended to 1.31 today  Continue to monitor UO/renal function. Avoid nephrotoxic agents  Follow up with BMP in the morning   Continue to monitor a.m. BMP. Pain syndrome, chronic  Assessment & Plan  Home regimen: Oxycodone 5 mg BID, Tylenol 650 mg q8 prn. Gabapentin 600 mg TID. Medical marijuana. Lumbar radiculopathy and impaired ambulatory dysfunction secondary to pain. Has bowel regimen and is having bowel movements daily. Patient required additional pain management during previous cycle and has some additional pain from tunneled line placement    Plan:  Continue gabapentin 300 mg TID, oxycodone 5 mg TID, prn Tylenol 650 mg q6.     Oxycodone 5mg every 8 hours  Oxycodone 2.5 mg q6 PRN    Benign essential hypertension  Assessment & Plan  Home regimen Coreg 12.5 mg BID, Amlodipine 10 mg daily, and lisinopril 40 mg. All discontinued at this time secondary to hypotension and PO since cycle 1. but stable currently without any antihypertensives. Systolic persistently elevated and tachycardic, potentially secondary to pain. Patient was more hypotensive during last chemo. Coreg contraindicated during chemo, since cycles are every other week, would be better to stick with lopressor during duration of therapy as opposed to switching constantly. Plan:  Monitor vitals. Continue Norvasc 10 and lopressor 25 bid    Protein-calorie malnutrition ([address])  Assessment & Plan  Malnutrition Findings:   Adult Malnutrition type: Chronic illness  Adult Degree of Malnutrition: Unspecified protein calorie malnutrition  Malnutrition Characteristics: Inadequate energy, Other (comment) (stress of illness)                  360 Statement: Protein calorie malnutrition r/t inadequate intake as evidenced by >7 day period of poor intakes and stress of infection; currently treated with regular diet and nutrition supplements    BMI Findings: Body mass index is 32.12 kg/m². Disposition: Stepdown Level 1    ICU Core Measures     Vented Patient  VAP Bundle  VAP bundle ordered     A: Assess, Prevent, and Manage Pain Has pain been assessed? No  Need for changes to pain regimen? No   B: Both Spontaneous Awakening Trials (SATs) and Spontaneous Breathing Trials (SBTs) Plan to perform spontaneous awakening trial today? Chronic vent   Plan to perform spontaneous breathing trial today? N/A   Obvious barriers to extubation? NA   C: Choice of Sedation RASS Goal: 0 Alert and Calm or +1 Restless  Need for changes to sedation or analgesia regimen? No   D: Delirium CAM-ICU: Negative   E: Early Mobility  Plan for early mobility?  NA   F: Family Engagement Plan for family engagement today? NA       Review of Invasive Devices:      Central access plan: Medications requiring central line      Prophylaxis:  VTE VTE covered by:  apixaban, Oral, 10 mg at 11/21/23 2111  apixaban, Oral       Stress Ulcer  covered byfamotidine (PEPCID) tablet 20 mg [[id number]]         Significant 24hr Events     24hr events: Pt had 2 episodes of hypoxia without any trigger. The episode was associated with tachypnea and dyspnea, SpO2 dropping as low as 50% for 20 mins. Suctioning, positional changes, coughing, were done in attempt to increase SpO2 however had no effect on the pt's condition. FiO2 was increased from 50% to 70% and eventually to 100%, which increased the SpO2 to 85%. Pt is currently on non breather mask. Subjective   Review of Systems   Constitutional: Negative. HENT:  Positive for voice change. Eyes: Negative. Respiratory:  Positive for shortness of breath. Cardiovascular: Negative. Gastrointestinal: Negative. Endocrine: Negative. Genitourinary: Negative. Musculoskeletal: Negative. Skin: Negative. Allergic/Immunologic: Negative. Neurological: Negative. Hematological: Negative. Psychiatric/Behavioral: Negative. Objective                            Vitals I/O      Most Recent Min/Max in 24hrs   Temp 98.8 °F (37.1 °C) Temp  Min: 98.3 °F (36.8 °C)  Max: 98.8 °F (37.1 °C)   Pulse 92 Pulse  Min: 77  Max: 118   Resp 20 Resp  Min: 18  Max: 20   /62 BP  Min: 90/56  Max: 130/62   O2 Sat (!) 89 % SpO2  Min: 30 %  Max: 100 %      Intake/Output Summary (Last 24 hours) at 11/22/2023 9437  Last data filed at 11/22/2023 0600  Gross per 24 hour   Intake 220 ml   Output 550 ml   Net -330 ml       Diet Regular; Regular House    Invasive Monitoring           Physical Exam   Physical Exam  Eyes:      General: Vision grossly intact. Extraocular Movements: Extraocular movements intact.       Conjunctiva/sclera: Conjunctivae normal.      Pupils: Pupils are equal, round, and reactive to light. Skin:     General: Skin is warm. HENT:      Head: Normocephalic and atraumatic. Right Ear: Hearing normal.      Left Ear: Hearing normal.      Mouth/Throat:      Mouth: Mucous membranes are moist.   Neck:      Trachea: Tracheostomy present. Cardiovascular:      Rate and Rhythm: Normal rate and regular rhythm. Pulses: Normal pulses. Heart sounds: Normal heart sounds. Musculoskeletal:         General: Normal range of motion. Abdominal:      Palpations: Abdomen is soft. Constitutional:       Appearance: She is ill-appearing. Pulmonary:      Effort: Pulmonary effort is normal.      Breath sounds: Normal breath sounds. Comments: Decrease breathing sounds at the base   Neurological:      General: No focal deficit present. Mental Status: She is alert and oriented to person, place and time. Mental status is at baseline. Diagnostic Studies      Imaging: ECHO I have personally reviewed pertinent reports.        Medications:  Scheduled PRN   amLODIPine, 10 mg, Daily  apixaban, 10 mg, BID  apixaban, 5 mg, BID  budesonide, 0.5 mg, Q12H  busPIRone, 30 mg, TID  chlorhexidine, 15 mL, Q12H ASHLEY  famotidine, 20 mg, BID  folic acid, 1 mg, Daily  formoterol, 20 mcg, Q12H  gabapentin, 300 mg, TID  levalbuterol, 1.25 mg, Q6H   And  ipratropium, 0.5 mg, Q6H  melatonin, 3 mg, HS  metoprolol tartrate, 25 mg, Q12H [facility]  nicotine, 7 mg, Daily  oxyCODONE, 5 mg, Q8H  polyethylene glycol, 17 g, Daily  QUEtiapine, 50 mg, HS  senna, 17.6 mg, BID  sertraline, 75 mg, Daily      alteplase, 2 mg, Q1MIN PRN  alteplase, 2 mg, Q1MIN PRN  alteplase, 2 mg, Q1MIN PRN  ondansetron, 4 mg, Q8H PRN  oxyCODONE, 2.5 mg, Q6H PRN       Continuous          Labs:    CBC    Recent Labs     11/21/23  0507   WBC 5.32   HGB 7.7*   HCT 26.1*        BMP    Recent Labs     11/21/23  0507   SODIUM 143   K 3.9      CO2 31   AGAP 6   BUN 20   CREATININE 1.31*   CALCIUM 9.1 Coags    No recent results     Additional Electrolytes  No recent results       Blood Gas    No recent results  No recent results LFTs  No recent results    Infectious  No recent results  Glucose  Recent Labs     11/21/23  0507   GLUC 82               Kenny Grover MD

## 2023-11-22 NOTE — CASE MANAGEMENT
Case Management Progress Note    Patient name Ashutosh Taylor  Location ICU 03/ICU 03 MRN [id number]  : 1955 Date 2023       LOS (days): 70  Geometric Mean LOS (GMLOS) (days): 26.10  Days to GMLOS:-43.9        OBJECTIVE:        Current admission status: Inpatient  Preferred Pharmacy:   [facility]/pharmacy #[id number]- LIZZY GARAY - [address]. [address] [facility] [address]  Phone: [phone] Fax: [phone] [facility] ([facility] [place] [address]  Phone: [phone] Fax: [phone]    Primary Care Provider: Catherine Sterling MD    Primary Insurance: [employer]  Secondary Insurance: [address] [facility]    PROGRESS NOTE:    Scripts completed by Dr. Loan Sood for [address]. CM sent to Desean Vickers [facility] with [address] [place] [address] Inderjit.

## 2023-11-22 NOTE — PLAN OF CARE
Problem: MOBILITY - ADULT  Goal: Maintain or return to baseline ADL function  Description: INTERVENTIONS:  -  Assess patient's ability to carry out ADLs; assess patient's baseline for ADL function and identify physical deficits which impact ability to perform ADLs (bathing, care of mouth/teeth, toileting, grooming, dressing, etc.)  - Assess/evaluate cause of self-care deficits   - Assess range of motion  - Assess patient's mobility; develop plan if impaired  - Assess patient's need for assistive devices and provide as appropriate  - Encourage maximum independence but intervene and supervise when necessary  - Involve family in performance of ADLs  - Assess for home care needs following discharge   - Consider OT consult to assist with ADL evaluation and planning for discharge  - Provide patient education as appropriate  11/22/2023 0245 by Cat Hidalgo RN  Outcome: Progressing  11/22/2023 0244 by Cat Hidalgo RN  Outcome: Progressing  Goal: Maintains/Returns to pre admission functional level  Description: INTERVENTIONS:  - Perform BMAT or MOVE assessment daily.   - Set and communicate daily mobility goal to care team and patient/family/caregiver.    - Collaborate with rehabilitation services on mobility goals if consulted  - Out of bed for toileting  - Record patient progress and toleration of activity level   11/22/2023 0245 by Cat Hidalgo RN  Outcome: Progressing  11/22/2023 0244 by Cat Hidalgo RN  Outcome: Progressing     Problem: Prexisting or High Potential for Compromised Skin Integrity  Goal: Skin integrity is maintained or improved  Description: INTERVENTIONS:  - Identify patients at risk for skin breakdown  - Assess and monitor skin integrity  - Assess and monitor nutrition and hydration status  - Monitor labs   - Assess for incontinence   - Turn and reposition patient  - Assist with mobility/ambulation  - Relieve pressure over bony prominences  - Avoid friction and shearing  - Provide appropriate hygiene as needed including keeping skin clean and dry  - Evaluate need for skin moisturizer/barrier cream  - Collaborate with interdisciplinary team   - Patient/family teaching  - Consider wound care consult   11/22/2023 0245 by Josemanuel Edgar RN  Outcome: Progressing  11/22/2023 0244 by Josemanuel Edgar RN  Outcome: Progressing     Problem: Nutrition/Hydration-ADULT  Goal: Nutrient/Hydration intake appropriate for improving, restoring or maintaining nutritional needs  Description: Monitor and assess patient's nutrition/hydration status for malnutrition. Collaborate with interdisciplinary team and initiate plan and interventions as ordered. Monitor patient's weight and dietary intake as ordered or per policy. Utilize nutrition screening tool and intervene as necessary. Determine patient's food preferences and provide high-protein, high-caloric foods as appropriate.      INTERVENTIONS:  - Monitor oral intake, urinary output, labs, and treatment plans  - Assess nutrition and hydration status and recommend course of action  - Evaluate amount of meals eaten  - Assist patient with eating if necessary   - Allow adequate time for meals  - Recommend/ encourage appropriate diets, oral nutritional supplements, and vitamin/mineral supplements  - Order, calculate, and assess calorie counts as needed  - Recommend, monitor, and adjust tube feedings and TPN/PPN based on assessed needs  - Assess need for intravenous fluids  - Provide specific nutrition/hydration education as appropriate  - Include patient/family/caregiver in decisions related to nutrition  11/22/2023 0245 by Josemanuel Edgar RN  Outcome: Progressing  11/22/2023 0244 by Josemanuel Edgar RN  Outcome: Progressing     Problem: PAIN - ADULT  Goal: Verbalizes/displays adequate comfort level or baseline comfort level  Description: Interventions:  - Encourage patient to monitor pain and request assistance  - Assess pain using appropriate pain scale  - Administer analgesics based on type and severity of pain and evaluate response  - Implement non-pharmacological measures as appropriate and evaluate response  - Consider cultural and social influences on pain and pain management  - Notify physician/advanced practitioner if interventions unsuccessful or patient reports new pain  11/22/2023 0245 by Prasad Ryder RN  Outcome: Progressing  11/22/2023 0244 by Prasad Ryder RN  Outcome: Progressing     Problem: INFECTION - ADULT  Goal: Absence or prevention of progression during hospitalization  Description: INTERVENTIONS:  - Assess and monitor for signs and symptoms of infection  - Monitor lab/diagnostic results  - Monitor all insertion sites, i.e. indwelling lines, tubes, and drains  - Monitor endotracheal if appropriate and nasal secretions for changes in amount and color  - [place] appropriate cooling/warming therapies per order  - Administer medications as ordered  - Instruct and encourage patient and family to use good hand hygiene technique  - Identify and instruct in appropriate isolation precautions for identified infection/condition  11/22/2023 0245 by Prasad Ryder RN  Outcome: Progressing  11/22/2023 0244 by Prasad Ryder RN  Outcome: Progressing  Goal: Absence of fever/infection during neutropenic period  Description: INTERVENTIONS:  - Monitor WBC    11/22/2023 0245 by Prasad Ryder RN  Outcome: Progressing  11/22/2023 0244 by Prasad Ryder RN  Outcome: Progressing     Problem: SAFETY ADULT  Goal: Maintain or return to baseline ADL function  Description: INTERVENTIONS:  -  Assess patient's ability to carry out ADLs; assess patient's baseline for ADL function and identify physical deficits which impact ability to perform ADLs (bathing, care of mouth/teeth, toileting, grooming, dressing, etc.)  - Assess/evaluate cause of self-care deficits   - Assess range of motion  - Assess patient's mobility; develop plan if impaired  - Assess patient's need for assistive devices and provide as appropriate  - Encourage maximum independence but intervene and supervise when necessary  - Involve family in performance of ADLs  - Assess for home care needs following discharge   - Consider OT consult to assist with ADL evaluation and planning for discharge  - Provide patient education as appropriate  11/22/2023 0245 by Krala Ron RN  Outcome: Progressing  11/22/2023 0244 by Karla Ron RN  Outcome: Progressing  Goal: Maintains/Returns to pre admission functional level  Description: INTERVENTIONS:  - Perform BMAT or MOVE assessment daily.   - Set and communicate daily mobility goal to care team and patient/family/caregiver.    - Collaborate with rehabilitation services on mobility goals if consulted  - Out of bed for toileting  - Record patient progress and toleration of activity level   11/22/2023 0245 by Karla Ron RN  Outcome: Progressing  11/22/2023 0244 by Karla Ron RN  Outcome: Progressing  Goal: Patient will remain free of falls  Description: INTERVENTIONS:  - Educate patient/family on patient safety including physical limitations  - Instruct patient to call for assistance with activity   - Consult OT/PT to assist with strengthening/mobility   - Keep Call bell within reach  - Keep bed low and locked with side rails adjusted as appropriate  - Keep care items and personal belongings within reach  - Initiate and maintain comfort rounds  - Make Fall Risk Sign visible to staff  - Apply yellow socks and bracelet for high fall risk patients  - Consider moving patient to room near nurses station  11/22/2023 0245 by Karla Ron RN  Outcome: Progressing  11/22/2023 0244 by Karla Ron RN  Outcome: Progressing     Problem: DISCHARGE PLANNING  Goal: Discharge to home or other facility with appropriate resources  Description: INTERVENTIONS:  - Identify barriers to discharge w/patient and caregiver  - Arrange for needed discharge resources and transportation as appropriate  - Identify discharge learning needs (meds, wound care, etc.)  - Arrange for interpretive services to assist at discharge as needed  - Refer to Case Management Department for coordinating discharge planning if the patient needs post-hospital services based on physician/advanced practitioner order or complex needs related to functional status, cognitive ability, or social support system  11/22/2023 0245 by Emma Rodriguez RN  Outcome: Progressing  11/22/2023 0244 by Emma Rodriguez RN  Outcome: Progressing     Problem: Knowledge Deficit  Goal: Patient/family/caregiver demonstrates understanding of disease process, treatment plan, medications, and discharge instructions  Description: Complete learning assessment and assess knowledge base.   Interventions:  - Provide teaching at level of understanding  - Provide teaching via preferred learning methods  11/22/2023 0245 by Emma Rodriguez RN  Outcome: Progressing  11/22/2023 0244 by Emma Rodriguez RN  Outcome: Progressing     Problem: RESPIRATORY - ADULT  Goal: Achieves optimal ventilation and oxygenation  Description: INTERVENTIONS:  - Assess for changes in respiratory status  - Assess for changes in mentation and behavior  - Position to facilitate oxygenation and minimize respiratory effort  - Oxygen administered by appropriate delivery if ordered  - Initiate smoking cessation education as indicated  - Encourage broncho-pulmonary hygiene including cough, deep breathe, Incentive Spirometry  - Assess the need for suctioning and aspirate as needed  - Assess and instruct to report SOB or any respiratory difficulty  - Respiratory Therapy support as indicated  11/22/2023 0245 by Emma Rodriguez RN  Outcome: Progressing  11/22/2023 0244 by Emma Rodriguez RN  Outcome: Progressing     Problem: GENITOURINARY - ADULT  Goal: Maintains or returns to baseline urinary function  Description: INTERVENTIONS:  - Assess urinary function  - Encourage oral fluids to ensure adequate hydration if ordered  - Administer IV fluids as ordered to ensure adequate hydration  - Administer ordered medications as needed  - Offer frequent toileting  - Follow urinary retention protocol if ordered  11/22/2023 0245 by Jillian Almonte RN  Outcome: Progressing  11/22/2023 0244 by Jillian Almonte RN  Outcome: Progressing  Goal: Absence of urinary retention  Description: INTERVENTIONS:  - Assess patient’s ability to void and empty bladder  - Monitor I/O  - Bladder scan as needed  - Discuss with physician/AP medications to alleviate retention as needed  - Discuss catheterization for long term situations as appropriate  11/22/2023 0245 by Jillian Almonte RN  Outcome: Progressing  11/22/2023 0244 by Jillian Almonte RN  Outcome: Progressing     Problem: METABOLIC, FLUID AND ELECTROLYTES - ADULT  Goal: Electrolytes maintained within normal limits  Description: INTERVENTIONS:  - Monitor labs and assess patient for signs and symptoms of electrolyte imbalances  - Administer electrolyte replacement as ordered  - Monitor response to electrolyte replacements, including repeat lab results as appropriate  - Instruct patient on fluid and nutrition as appropriate  11/22/2023 0245 by Jillian Almonte RN  Outcome: Progressing  11/22/2023 0244 by Jillian Almonte RN  Outcome: Progressing  Goal: Fluid balance maintained  Description: INTERVENTIONS:  - Monitor labs   - Monitor I/O and WT  - Instruct patient on fluid and nutrition as appropriate  - Assess for signs & symptoms of volume excess or deficit  11/22/2023 0245 by Jillian Almonte RN  Outcome: Progressing  11/22/2023 0244 by Jillian Almonte RN  Outcome: Progressing  Goal: Glucose maintained within target range  Description: INTERVENTIONS:  - Monitor Blood Glucose as ordered  - Assess for signs and symptoms of hyperglycemia and hypoglycemia  - Administer ordered medications to maintain glucose within target range  - Assess nutritional intake and initiate nutrition service referral as needed  11/22/2023 0245 by Renzo Hagan, RN  Outcome: Progressing  11/22/2023 0244 by Renzo Hagan, RN  Outcome: Progressing

## 2023-11-22 NOTE — ASSESSMENT & PLAN NOTE
Wt Readings from Last 3 Encounters:   11/22/23 74.6 kg (164 lb 7.4 oz)   09/07/23 74.6 kg (164 lb 6.4 oz)   08/30/23 73.3 kg (161 lb 9.6 oz)     Lab Results   Component Value Date    LVEF 65 11/21/2023     (H) 10/28/2023     (H) 09/29/2023     Echo 11/21/23: LVEF 65%.        Plan:  K > 4   Measure I/O

## 2023-11-22 NOTE — PROGRESS NOTES
Patient had brief oxygen desaturation event, but as low as 32 % was seen by nursing staff. Patient did not appear to be in distress. Encouraged coughing and expectorating secretions, and placed on NRB. Shortly returned to oxygen in the 90s. Respiratory therapy contacted and at bedside.

## 2023-11-23 LAB
ANION GAP SERPL CALCULATED.3IONS-SCNC: 6 MMOL/L
BASE EXCESS BLDA CALC-SCNC: 3.2 MMOL/L
BUN SERPL-MCNC: 19 MG/DL (ref 5–25)
CALCIUM SERPL-MCNC: 9.9 MG/DL (ref 8.4–10.2)
CHLORIDE SERPL-SCNC: 106 MMOL/L (ref 96–108)
CO2 SERPL-SCNC: 32 MMOL/L (ref 21–32)
CREAT SERPL-MCNC: 1.83 MG/DL (ref 0.6–1.3)
ERYTHROCYTE [DISTWIDTH] IN BLOOD BY AUTOMATED COUNT: 16.4 % (ref 11.6–15.1)
GFR SERPL CREATININE-BSD FRML MDRD: 27 ML/MIN/1.73SQ M
GLUCOSE SERPL-MCNC: 95 MG/DL (ref 65–140)
HCO3 BLDA-SCNC: 29.3 MMOL/L (ref 22–28)
HCT VFR BLD AUTO: 33.1 % (ref 34.8–46.1)
HGB BLD-MCNC: 9.9 G/DL (ref 11.5–15.4)
LDH SERPL-CCNC: 166 U/L (ref 140–271)
MCH RBC QN AUTO: 29.8 PG (ref 26.8–34.3)
MCHC RBC AUTO-ENTMCNC: 29.9 G/DL (ref 31.4–37.4)
MCV RBC AUTO: 100 FL (ref 82–98)
O2 CT BLDA-SCNC: 7.5 ML/DL (ref 16–23)
OXYHGB MFR BLDA: 64.9 % (ref 94–97)
PCO2 BLDA: 53.4 MM HG (ref 36–44)
PH BLDA: 7.36 [PH] (ref 7.35–7.45)
PLATELET # BLD AUTO: 229 THOUSANDS/UL (ref 149–390)
PMV BLD AUTO: 9.6 FL (ref 8.9–12.7)
PO2 BLDA: 36.9 MM HG (ref 75–129)
POTASSIUM SERPL-SCNC: 4.8 MMOL/L (ref 3.5–5.3)
PS CM H2O: 8
PS VENT FIO2: 70
PS VENT PEEP: 8
RBC # BLD AUTO: 3.32 MILLION/UL (ref 3.81–5.12)
SODIUM SERPL-SCNC: 144 MMOL/L (ref 135–147)
SPECIMEN SOURCE: ABNORMAL
URATE SERPL-MCNC: 7.6 MG/DL (ref 2–7.5)
VENT - PS: ABNORMAL
WBC # BLD AUTO: 6.25 THOUSAND/UL (ref 4.31–10.16)

## 2023-11-23 PROCEDURE — 82805 BLOOD GASES W/O2 SATURATION: CPT | Performed by: NURSE PRACTITIONER

## 2023-11-23 PROCEDURE — 94669 MECHANICAL CHEST WALL OSCILL: CPT

## 2023-11-23 PROCEDURE — 94640 AIRWAY INHALATION TREATMENT: CPT

## 2023-11-23 PROCEDURE — 94760 N-INVAS EAR/PLS OXIMETRY 1: CPT

## 2023-11-23 PROCEDURE — 94150 VITAL CAPACITY TEST: CPT

## 2023-11-23 PROCEDURE — 84550 ASSAY OF BLOOD/URIC ACID: CPT | Performed by: INTERNAL MEDICINE

## 2023-11-23 PROCEDURE — 80048 BASIC METABOLIC PNL TOTAL CA: CPT

## 2023-11-23 PROCEDURE — 99233 SBSQ HOSP IP/OBS HIGH 50: CPT | Performed by: INTERNAL MEDICINE

## 2023-11-23 PROCEDURE — 85027 COMPLETE CBC AUTOMATED: CPT

## 2023-11-23 PROCEDURE — 83615 LACTATE (LD) (LDH) ENZYME: CPT | Performed by: INTERNAL MEDICINE

## 2023-11-23 PROCEDURE — 94002 VENT MGMT INPAT INIT DAY: CPT

## 2023-11-23 RX ADMIN — BUDESONIDE 0.5 MG: 0.5 INHALANT ORAL at 07:58

## 2023-11-23 RX ADMIN — BUDESONIDE 0.5 MG: 0.5 INHALANT ORAL at 20:40

## 2023-11-23 RX ADMIN — APIXABAN 5 MG: 5 TABLET, FILM COATED ORAL at 08:22

## 2023-11-23 RX ADMIN — LEVALBUTEROL HYDROCHLORIDE 1.25 MG: 1.25 SOLUTION RESPIRATORY (INHALATION) at 20:40

## 2023-11-23 RX ADMIN — OXYCODONE HYDROCHLORIDE 5 MG: 5 SOLUTION ORAL at 21:05

## 2023-11-23 RX ADMIN — CHLORHEXIDINE GLUCONATE 15 ML: 1.2 SOLUTION ORAL at 21:05

## 2023-11-23 RX ADMIN — IPRATROPIUM BROMIDE 0.5 MG: 0.5 SOLUTION RESPIRATORY (INHALATION) at 02:15

## 2023-11-23 RX ADMIN — NICOTINE 7 MG: 7 PATCH, EXTENDED RELEASE TRANSDERMAL at 08:27

## 2023-11-23 RX ADMIN — FORMOTEROL FUMARATE DIHYDRATE 20 MCG: 20 SOLUTION RESPIRATORY (INHALATION) at 07:58

## 2023-11-23 RX ADMIN — BUSPIRONE HYDROCHLORIDE 30 MG: 10 TABLET ORAL at 08:21

## 2023-11-23 RX ADMIN — CHLORHEXIDINE GLUCONATE 15 ML: 1.2 SOLUTION ORAL at 08:21

## 2023-11-23 RX ADMIN — BUSPIRONE HYDROCHLORIDE 30 MG: 10 TABLET ORAL at 21:05

## 2023-11-23 RX ADMIN — LEVALBUTEROL HYDROCHLORIDE 1.25 MG: 1.25 SOLUTION RESPIRATORY (INHALATION) at 07:58

## 2023-11-23 RX ADMIN — LEVALBUTEROL HYDROCHLORIDE 1.25 MG: 1.25 SOLUTION RESPIRATORY (INHALATION) at 02:15

## 2023-11-23 RX ADMIN — SERTRALINE 75 MG: 25 TABLET, FILM COATED ORAL at 08:22

## 2023-11-23 RX ADMIN — GABAPENTIN 300 MG: 300 CAPSULE ORAL at 17:57

## 2023-11-23 RX ADMIN — GABAPENTIN 300 MG: 300 CAPSULE ORAL at 21:06

## 2023-11-23 RX ADMIN — FOLIC ACID 1 MG: 1 TABLET ORAL at 08:22

## 2023-11-23 RX ADMIN — GABAPENTIN 300 MG: 300 CAPSULE ORAL at 08:22

## 2023-11-23 RX ADMIN — OXYCODONE HYDROCHLORIDE 5 MG: 5 SOLUTION ORAL at 12:15

## 2023-11-23 RX ADMIN — QUETIAPINE FUMARATE 50 MG: 25 TABLET ORAL at 21:05

## 2023-11-23 RX ADMIN — FAMOTIDINE 20 MG: 20 TABLET, FILM COATED ORAL at 17:57

## 2023-11-23 RX ADMIN — MELATONIN 3 MG: at 21:05

## 2023-11-23 RX ADMIN — FORMOTEROL FUMARATE DIHYDRATE 20 MCG: 20 SOLUTION RESPIRATORY (INHALATION) at 20:40

## 2023-11-23 RX ADMIN — IPRATROPIUM BROMIDE 0.5 MG: 0.5 SOLUTION RESPIRATORY (INHALATION) at 07:58

## 2023-11-23 RX ADMIN — IPRATROPIUM BROMIDE 0.5 MG: 0.5 SOLUTION RESPIRATORY (INHALATION) at 14:31

## 2023-11-23 RX ADMIN — BUSPIRONE HYDROCHLORIDE 30 MG: 10 TABLET ORAL at 17:57

## 2023-11-23 RX ADMIN — IPRATROPIUM BROMIDE 0.5 MG: 0.5 SOLUTION RESPIRATORY (INHALATION) at 20:40

## 2023-11-23 RX ADMIN — METOPROLOL TARTRATE 25 MG: 25 TABLET, FILM COATED ORAL at 21:05

## 2023-11-23 RX ADMIN — FAMOTIDINE 20 MG: 20 TABLET, FILM COATED ORAL at 08:22

## 2023-11-23 RX ADMIN — OXYCODONE HYDROCHLORIDE 5 MG: 5 SOLUTION ORAL at 05:22

## 2023-11-23 RX ADMIN — LEVALBUTEROL HYDROCHLORIDE 1.25 MG: 1.25 SOLUTION RESPIRATORY (INHALATION) at 14:31

## 2023-11-23 RX ADMIN — APIXABAN 5 MG: 5 TABLET, FILM COATED ORAL at 21:05

## 2023-11-23 NOTE — PLAN OF CARE
Problem: MOBILITY - ADULT  Goal: Maintain or return to baseline ADL function  Description: INTERVENTIONS:  -  Assess patient's ability to carry out ADLs; assess patient's baseline for ADL function and identify physical deficits which impact ability to perform ADLs (bathing, care of mouth/teeth, toileting, grooming, dressing, etc.)  - Assess/evaluate cause of self-care deficits   - Assess range of motion  - Assess patient's mobility; develop plan if impaired  - Assess patient's need for assistive devices and provide as appropriate  - Encourage maximum independence but intervene and supervise when necessary  - Involve family in performance of ADLs  - Assess for home care needs following discharge   - Consider OT consult to assist with ADL evaluation and planning for discharge  - Provide patient education as appropriate  Outcome: Progressing  Goal: Maintains/Returns to pre admission functional level  Description: INTERVENTIONS:  - Perform BMAT or MOVE assessment daily.   - Set and communicate daily mobility goal to care team and patient/family/caregiver.    - Collaborate with rehabilitation services on mobility goals if consulted  - Out of bed for toileting  - Record patient progress and toleration of activity level   Outcome: Progressing     Problem: Prexisting or High Potential for Compromised Skin Integrity  Goal: Skin integrity is maintained or improved  Description: INTERVENTIONS:  - Identify patients at risk for skin breakdown  - Assess and monitor skin integrity  - Assess and monitor nutrition and hydration status  - Monitor labs   - Assess for incontinence   - Turn and reposition patient  - Assist with mobility/ambulation  - Relieve pressure over bony prominences  - Avoid friction and shearing  - Provide appropriate hygiene as needed including keeping skin clean and dry  - Evaluate need for skin moisturizer/barrier cream  - Collaborate with interdisciplinary team   - Patient/family teaching  - Consider wound care consult   Outcome: Progressing     Problem: Nutrition/Hydration-ADULT  Goal: Nutrient/Hydration intake appropriate for improving, restoring or maintaining nutritional needs  Description: Monitor and assess patient's nutrition/hydration status for malnutrition. Collaborate with interdisciplinary team and initiate plan and interventions as ordered. Monitor patient's weight and dietary intake as ordered or per policy. Utilize nutrition screening tool and intervene as necessary. Determine patient's food preferences and provide high-protein, high-caloric foods as appropriate.      INTERVENTIONS:  - Monitor oral intake, urinary output, labs, and treatment plans  - Assess nutrition and hydration status and recommend course of action  - Evaluate amount of meals eaten  - Assist patient with eating if necessary   - Allow adequate time for meals  - Recommend/ encourage appropriate diets, oral nutritional supplements, and vitamin/mineral supplements  - Order, calculate, and assess calorie counts as needed  - Recommend, monitor, and adjust tube feedings and TPN/PPN based on assessed needs  - Assess need for intravenous fluids  - Provide specific nutrition/hydration education as appropriate  - Include patient/family/caregiver in decisions related to nutrition  Outcome: Progressing     Problem: PAIN - ADULT  Goal: Verbalizes/displays adequate comfort level or baseline comfort level  Description: Interventions:  - Encourage patient to monitor pain and request assistance  - Assess pain using appropriate pain scale  - Administer analgesics based on type and severity of pain and evaluate response  - Implement non-pharmacological measures as appropriate and evaluate response  - Consider cultural and social influences on pain and pain management  - Notify physician/advanced practitioner if interventions unsuccessful or patient reports new pain  Outcome: Progressing     Problem: INFECTION - ADULT  Goal: Absence or prevention of progression during hospitalization  Description: INTERVENTIONS:  - Assess and monitor for signs and symptoms of infection  - Monitor lab/diagnostic results  - Monitor all insertion sites, i.e. indwelling lines, tubes, and drains  - Monitor endotracheal if appropriate and nasal secretions for changes in amount and color  - [place] appropriate cooling/warming therapies per order  - Administer medications as ordered  - Instruct and encourage patient and family to use good hand hygiene technique  - Identify and instruct in appropriate isolation precautions for identified infection/condition  Outcome: Progressing  Goal: Absence of fever/infection during neutropenic period  Description: INTERVENTIONS:  - Monitor WBC    Outcome: Progressing     Problem: SAFETY ADULT  Goal: Maintain or return to baseline ADL function  Description: INTERVENTIONS:  -  Assess patient's ability to carry out ADLs; assess patient's baseline for ADL function and identify physical deficits which impact ability to perform ADLs (bathing, care of mouth/teeth, toileting, grooming, dressing, etc.)  - Assess/evaluate cause of self-care deficits   - Assess range of motion  - Assess patient's mobility; develop plan if impaired  - Assess patient's need for assistive devices and provide as appropriate  - Encourage maximum independence but intervene and supervise when necessary  - Involve family in performance of ADLs  - Assess for home care needs following discharge   - Consider OT consult to assist with ADL evaluation and planning for discharge  - Provide patient education as appropriate  Outcome: Progressing  Goal: Maintains/Returns to pre admission functional level  Description: INTERVENTIONS:  - Perform BMAT or MOVE assessment daily.   - Set and communicate daily mobility goal to care team and patient/family/caregiver.    - Collaborate with rehabilitation services on mobility goals if consulted  - Out of bed for toileting  - Record patient progress and toleration of activity level   Outcome: Progressing  Goal: Patient will remain free of falls  Description: INTERVENTIONS:  - Educate patient/family on patient safety including physical limitations  - Instruct patient to call for assistance with activity   - Consult OT/PT to assist with strengthening/mobility   - Keep Call bell within reach  - Keep bed low and locked with side rails adjusted as appropriate  - Keep care items and personal belongings within reach  - Initiate and maintain comfort rounds  - Make Fall Risk Sign visible to staff  - Apply yellow socks and bracelet for high fall risk patients  - Consider moving patient to room near nurses station  Outcome: Progressing     Problem: DISCHARGE PLANNING  Goal: Discharge to home or other facility with appropriate resources  Description: INTERVENTIONS:  - Identify barriers to discharge w/patient and caregiver  - Arrange for needed discharge resources and transportation as appropriate  - Identify discharge learning needs (meds, wound care, etc.)  - Arrange for interpretive services to assist at discharge as needed  - Refer to Case Management Department for coordinating discharge planning if the patient needs post-hospital services based on physician/advanced practitioner order or complex needs related to functional status, cognitive ability, or social support system  Outcome: Progressing     Problem: Knowledge Deficit  Goal: Patient/family/caregiver demonstrates understanding of disease process, treatment plan, medications, and discharge instructions  Description: Complete learning assessment and assess knowledge base.   Interventions:  - Provide teaching at level of understanding  - Provide teaching via preferred learning methods  Outcome: Progressing     Problem: RESPIRATORY - ADULT  Goal: Achieves optimal ventilation and oxygenation  Description: INTERVENTIONS:  - Assess for changes in respiratory status  - Assess for changes in mentation and behavior  - Position to facilitate oxygenation and minimize respiratory effort  - Oxygen administered by appropriate delivery if ordered  - Initiate smoking cessation education as indicated  - Encourage broncho-pulmonary hygiene including cough, deep breathe, Incentive Spirometry  - Assess the need for suctioning and aspirate as needed  - Assess and instruct to report SOB or any respiratory difficulty  - Respiratory Therapy support as indicated  Outcome: Progressing     Problem: GENITOURINARY - ADULT  Goal: Maintains or returns to baseline urinary function  Description: INTERVENTIONS:  - Assess urinary function  - Encourage oral fluids to ensure adequate hydration if ordered  - Administer IV fluids as ordered to ensure adequate hydration  - Administer ordered medications as needed  - Offer frequent toileting  - Follow urinary retention protocol if ordered  Outcome: Progressing  Goal: Absence of urinary retention  Description: INTERVENTIONS:  - Assess patient’s ability to void and empty bladder  - Monitor I/O  - Bladder scan as needed  - Discuss with physician/AP medications to alleviate retention as needed  - Discuss catheterization for long term situations as appropriate  Outcome: Progressing     Problem: METABOLIC, FLUID AND ELECTROLYTES - ADULT  Goal: Electrolytes maintained within normal limits  Description: INTERVENTIONS:  - Monitor labs and assess patient for signs and symptoms of electrolyte imbalances  - Administer electrolyte replacement as ordered  - Monitor response to electrolyte replacements, including repeat lab results as appropriate  - Instruct patient on fluid and nutrition as appropriate  Outcome: Progressing  Goal: Fluid balance maintained  Description: INTERVENTIONS:  - Monitor labs   - Monitor I/O and WT  - Instruct patient on fluid and nutrition as appropriate  - Assess for signs & symptoms of volume excess or deficit  Outcome: Progressing  Goal: Glucose maintained within target range  Description: INTERVENTIONS:  - Monitor Blood Glucose as ordered  - Assess for signs and symptoms of hyperglycemia and hypoglycemia  - Administer ordered medications to maintain glucose within target range  - Assess nutritional intake and initiate nutrition service referral as needed  Outcome: Progressing

## 2023-11-23 NOTE — ASSESSMENT & PLAN NOTE
Lab Results   Component Value Date    EGFR 27 11/23/2023    EGFR 40 11/22/2023    EGFR 41 11/21/2023    CREATININE 1.83 (H) 11/23/2023    CREATININE 1.33 (H) 11/22/2023    CREATININE 1.31 (H) 11/21/2023     Baseline Cr between 0.75-1.1  Initial HERIBERTO felt 2/2 prerenal azotemia 2/2 diuresis, reduced PO intake +/- ATN. Patient received 2 doses of Bumex IV 2 g as she had not been responding appropriately to IV 40 Lasix daily. Creatinine went up by 0.5 meeting criteria for an HERIBERTO. Suspect most likely due to medications as a combination of prerenal and intrinsic. FENa was 0.2%, UA shows no casts or signs of infection, urine eosinophils 0%. Patient likely has prerenal HERIBERTO from volume depletion. Received 2 L NS and 1 pRBC and cr went up by 0.07 still and Na and Cl went down, suspect that volume prevented further cr rise and free water excretion impaired by kidney function, allowing hyponatremia to increase. Suspect that now that nephrotoxic medications have been stopped she responded well to Lasix and creatinine downtrended. HERIBERTO now resolved. Will be cautious about nephrotoxins and monitor as restarting EPOCH and ppx. Patient gets volume depleted and overloaded very easily. Especially from chemo. 10/19 Heriberto as cr went up by 0.3 in 48 hours from 0.82 on 10/17 to 1.22 on 10/19. Suspect this is prerenal hypovolemic, will see if patient improves with fluids. She gets overloaded easily so if no improvement suspect CRS again. 10/29 HERIBERTO cr went up by 0.3 again. She has 922 ml UOP in last 24 hours. HERIBERTO likely from lasix 40mg IV given yesterday. She is not hypovolemic.    11/17 HERIBERTO creatinine up to 1.8. Isolyte bolus 500 given. Plan:   Creatinine up trended to 1.31 today  Continue to monitor UO/renal function. Avoid nephrotoxic agents  Follow up with BMP in the morning   Continue to monitor a.m. BMP.

## 2023-11-23 NOTE — PROGRESS NOTES
[facility]  Progress Note  Name: Raul Kahn  MRN: [id number]  Unit/Bed#: ICU 03 I Date of Admission: 9/13/2023   Date of Service: 11/23/2023 I Hospital Day: 70    Assessment/Plan   * Acute respiratory failure with hypoxia secondary to oropharyngeal mass  Assessment & Plan  Cuffless trach placed 9/17, transitioned to cuffed on 10/3. Oxygen requirements not improving. For mental health patient is on buspirone, quetiapine, and sertraline. For pain, gabapentin, oxycodone scheduled and prn, prn oxycodone mainly utilized for increased pain during chemo and after a bronch. On Guaifenesin, Atrovent and Xopenex for airway maintenance. No improvement in bilateral consolidation or atelectasis and groundglass opacities on repeat CT and chest x-rays. Bronchial cx with 3 colonies CoNS. CTCAP 10/12:  indicates additional groundglass opacity with paraseptal emphysema, which may be contributing to inability to remain off vent. Bronchoscopy performed and unremarkable. Samples sent to lab for culture. Patient was placed back on vent s/p procedure. Now on high flow. Completed 7 day course of cefepime and Vancomycin. Bronchial culture and gram stain negative. Micro negative for CMV, AFB, Fungitell, Pneumocystis jiroveci (carinii), Histoplasma, Aspergillus. Trach secretions sent for sputum culture shows 2+ polys, 1+ gram-positive cocci in pairs, chains and clusters and 1+ gram-negative rods. 11/14 Discussion regarding possibilities of discharge done with family. Family shows understanding of patient's condition. 11/15 Trach is capped and patient is placed on 3 L of NC briefly for 2 days. Patient is back on trach collar at high flow and NC. Cuffless trach was placed 9/17.  Replaced with a cuffed Shiley XLT #7 10/3  Pt was on BiPAP via vent for the past 2 nights, tolerating it well   Currently on HFNC on 50 L of oxygen     ECHO 11/21/23 - EF 65%, no patent foramen ovale      11/22 - As per plan, pt was on trach collar with FiO2 at 50%, however pt 2 episodes of hypoxia without any trigger. The episode was associated with tachypnea and dyspnea, SpO2 dropping as low as 50% for 20 mins. Suctioning, positional changes, coughing, were done in attempt to increase SpO2 however had no effect on the pt's condition. FiO2 was increased from 50% to 70% and eventually to 100%, which increased the SpO2 to 85%. Pt is currently on non breather mask. 11/23 - Pt had one episode of hypoxia associated with tachypnea and dyspnea last night secondary to off target BiPAP settings. SpO2 were dropped as low as 69%. When BiPAP settings were corrected (EPAP 8, PEEP 8, PSV 8, FiO2 70) pt's SpO2 improved in the 90s. Pt is currently on 15 L mid flow via nasal canula. Plan:  Continue to titrate O2 to maintain SpO2 >85%  Aggressive pulmonary toilet   Incentive Spirometry   Suctioning via trach if patient is desatting and unable to expectorate phlegm. Continue inhalation treatment with Pulmicort and formoterol q12 , duonebs QID, atrovent and xopenex   Regular diet  Daily PT/OT  Follow up with the CXR     Large cell lymphoma ([address])  Assessment & Plan  Large B-cell lymphoma, stage II. First chemo cycle 9/22 4 days of R-EPOCH. 9/27 Rituxan. 9/28 Filgrastim. Received nivestym 300 mc 7 days dcd on 10/26   Discontinue Prophylaxis as per Heme Oncology recommendations     9/22  4 days of R-EPOCH.  9/27 Rituxan  10/13 for [facility] cycle 2, which was done over 4 days and cytoxan on 10/19.  11/6 completed R-EPOCH cycle 3 and Cytoxan 11/7. Plan:  CT neck w wo contrast 11/15 (showed IJ thrombus as above)  Pt will receive her chemotherapy on 11/24 as scheduled regardless of PET scan   Transfuse blood products as needed.   Keep Hgb>7 and Plt>20 for chemo   See acute respiratory failure above    Acute embolism and thrombosis of right internal jugular vein (HCC)  Assessment & Plan  CT soft tissues of the neck: Nonocclusive thrombus noted within the right internal jugular vein just above the Mediport entrance site into the internal jugular vein. No signs of pain, headaches, vision changes. Plan:  Eliquis 5 mg     Oropharyngeal mass  Assessment & Plan  9/14 Neck/ soft tissue CT: Large enhancing soft tissue mass identified within the left neck involving multiple spaces. Centered within the left oropharynx with extensions as described above into multiple spaces. This results in significant airway compromise. 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 Small level 3/level 4 nodes are seen within the neck. Tracheostomy by ENT. Replaced on 9/26. H/o tobacco abuse, daily smoker. On nicotine while inpatient, confirmed with patient she needs nicotine patch. CT soft tissue neck shows reduced mass effect from 9/14 to 10/13. Can consider reducing trach size if continues to improve. Patient often refusing peg tube feeds but is feeding herself small meals orally. Plan:  ENT and heme onc following  Pt will be receiving her 4th cycle of chemotherapy on 11/24 regardless of her PET scan  As per speech therapist diet  Dysphagia 2. Continue parallel PEG tube feeds for nutrition. See acute respiratory failure and large B cell lymphoma above. Pancytopenia due to chemotherapy [facility])  Assessment & Plan  Recent Labs     11/21/23  0507 11/22/23  0701 11/23/23  0524   HGB 7.7* 8.0* 9.9*       '  Patient received 1 PRBC transfusion on 10/5 and 10/25. Her B/L hemoglobin is 12-14. No sites of bleeding, no black stools. Iron panel indicative of inflammatory anemia. Normal Vitamin B12 levels. Folate low at 5.5. Filgrastim 4 doses administered by heme/onc. Hb is improved to 9.9     Plan:  Trend hemoglobin and transfuse for <7. As per heme/onc transfuse irradiated and leukoreduced blood products.   Trend platelets  Folic acid 1mg daily supplementation  As per conversation with Heme/oncology attending, patient pancytopenia associated with chemotherapy is expected/predictable. No further anemia w/u required. Low ANC management as per heme/onc    Blister (nonthermal) of left forearm, sequela  Assessment & Plan  Blisters of left upper extremity healing, no signs of infection, continue daily wound care. (HFpEF) heart failure with preserved ejection fraction [facility])  Assessment & Plan  Wt Readings from Last 3 Encounters:   11/23/23 73 kg (160 lb 15 oz)   09/07/23 74.6 kg (164 lb 6.4 oz)   08/30/23 73.3 kg (161 lb 9.6 oz)     Lab Results   Component Value Date    LVEF 65 11/21/2023     (H) 10/28/2023     (H) 09/29/2023     Echo 11/21/23: LVEF 65%. Plan:  K > 4   Measure I/O      Ambulatory dysfunction  Assessment & Plan  2/2 Impacted by chronic pain syndrome + prolonged hospital stay. Continue OOB with PT. PT rec post acute rehab however reportedly Cannot get LTACH placement while getting chemo per CM  She is aware STR SNFs continue to follow and treatment can be done from a SNF level of care. PT would need to be on trach collar for at least 72 hours with no need for the vent. Aware that pt would need to be around 28% FiO2     Depression  Assessment & Plan  Patient on Zoloft 75 daily and Seroquel hs  Patient is depressed, multiple family/palliative discussions. patient is firm that she wants to live and to fight, she is just tired. Currently goal is disease treatment oriented. Full code. No SI/HI ideations. Palliative signed off, will reconsult if patient changes her mind regarding wishes. Chronic Superficial thrombophlebitis  Assessment & Plan  Right forearm soreness. RUE US: No acute or chronic deep vein thrombosis. 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 PO not severe enough to require renal dosing at this time, will continue to monitor and adjust dosing as needed.       Continue DVT ppx with Eliquis     CKD (chronic kidney disease) stage 3, GFR 30-59 ml/min [facility])  Assessment & Plan  Lab Results   Component Value Date    EGFR 27 11/23/2023    EGFR 40 11/22/2023    EGFR 41 11/21/2023    CREATININE 1.83 (H) 11/23/2023    CREATININE 1.33 (H) 11/22/2023    CREATININE 1.31 (H) 11/21/2023     Baseline Cr between 0.75-1.1  Initial HERIBERTO felt 2/2 prerenal azotemia 2/2 diuresis, reduced PO intake +/- ATN. Patient received 2 doses of Bumex IV 2 g as she had not been responding appropriately to IV 40 Lasix daily. Creatinine went up by 0.5 meeting criteria for an HERIBERTO. Suspect most likely due to medications as a combination of prerenal and intrinsic. FENa was 0.2%, UA shows no casts or signs of infection, urine eosinophils 0%. Patient likely has prerenal HERIBERTO from volume depletion. Received 2 L NS and 1 pRBC and cr went up by 0.07 still and Na and Cl went down, suspect that volume prevented further cr rise and free water excretion impaired by kidney function, allowing hyponatremia to increase. Suspect that now that nephrotoxic medications have been stopped she responded well to Lasix and creatinine downtrended. HERIBERTO now resolved. Will be cautious about nephrotoxins and monitor as restarting EPOCH and ppx. Patient gets volume depleted and overloaded very easily. Especially from chemo. 10/19 Heriberto as cr went up by 0.3 in 48 hours from 0.82 on 10/17 to 1.22 on 10/19. Suspect this is prerenal hypovolemic, will see if patient improves with fluids. She gets overloaded easily so if no improvement suspect CRS again. 10/29 HERIBERTO cr went up by 0.3 again. She has 922 ml UOP in last 24 hours. HERIBERTO likely from lasix 40mg IV given yesterday. She is not hypovolemic.    11/17 HERIBERTO creatinine up to 1.8. Isolyte bolus 500 given. Plan:   Creatinine up trended to 1.31 today  Continue to monitor UO/renal function. Avoid nephrotoxic agents  Follow up with BMP in the morning   Continue to monitor a.m. BMP. Pain syndrome, chronic  Assessment & Plan  Home regimen: Oxycodone 5 mg BID, Tylenol 650 mg q8 prn. Gabapentin 600 mg TID.   Medical marijuana. Lumbar radiculopathy and impaired ambulatory dysfunction secondary to pain. Has bowel regimen and is having bowel movements daily. Patient required additional pain management during previous cycle and has some additional pain from tunneled line placement    Plan:  Continue gabapentin 300 mg TID, oxycodone 5 mg TID, prn Tylenol 650 mg q6. Oxycodone 5mg every 8 hours  Oxycodone 2.5 mg q6 PRN    Benign essential hypertension  Assessment & Plan  Home regimen Coreg 12.5 mg BID, Amlodipine 10 mg daily, and lisinopril 40 mg. All discontinued at this time secondary to hypotension and PO since cycle 1. but stable currently without any antihypertensives. Systolic persistently elevated and tachycardic, potentially secondary to pain. Patient was more hypotensive during last chemo. Coreg contraindicated during chemo, since cycles are every other week, would be better to stick with lopressor during duration of therapy as opposed to switching constantly. Plan:  Monitor vitals. Continue Norvasc 10 and lopressor 25 bid    Protein-calorie malnutrition ([address])  Assessment & Plan  Malnutrition Findings:   Adult Malnutrition type: Chronic illness  Adult Degree of Malnutrition: Unspecified protein calorie malnutrition  Malnutrition Characteristics: Inadequate energy, Other (comment) (stress of illness)                  360 Statement: Protein calorie malnutrition r/t inadequate intake as evidenced by >7 day period of poor intakes and stress of infection; currently treated with regular diet and nutrition supplements    BMI Findings: Body mass index is 31.43 kg/m². Disposition: Stepdown Level 1    ICU Core Measures     Vented Patient  VAP Bundle  VAP bundle ordered     A: Assess, Prevent, and Manage Pain Has pain been assessed? Yes  Need for changes to pain regimen?  No   B: Both Spontaneous Awakening Trials (SATs) and Spontaneous Breathing Trials (SBTs) Plan to perform spontaneous awakening trial today? N/A   Plan to perform spontaneous breathing trial today? N/A   Obvious barriers to extubation? NA   C: Choice of Sedation RASS Goal: 0 Alert and Calm  Need for changes to sedation or analgesia regimen? NA   D: Delirium CAM-ICU: Negative   E: Early Mobility  Plan for early mobility? NA   F: Family Engagement Plan for family engagement today? NA       Antibiotic Review: Patient on appropriate coverage based on culture data. Review of Invasive Devices:      Central access plan: Medications requiring central line      Prophylaxis:  VTE VTE covered by:  apixaban, Oral, 5 mg at 11/22/23 2014       Stress Ulcer  covered byfamotidine (PEPCID) tablet 20 mg [[id number]]         Significant 24hr Events     24hr events:  Pt had one episode of hypoxia associated with tachypnea and dyspnea last night secondary to off target BiPAP settings. SpO2 were dropped as low as 69%. When BiPAP settings were corrected (EPAP 8, PEEP 8, PSV 8, FiO2 70) pt's SpO2 improved in the 90s. Pt is currently on 15 L mid flow via nasal canula. Subjective   Review of Systems   Constitutional: Negative. HENT:  Positive for voice change. Respiratory:  Positive for cough and shortness of breath. Cardiovascular: Negative. Gastrointestinal: Negative. Endocrine: Negative. Genitourinary: Negative. Musculoskeletal: Negative. Skin: Negative. Allergic/Immunologic: Negative. Neurological: Negative. Hematological: Negative. Psychiatric/Behavioral: Negative.         Objective                            Vitals I/O      Most Recent Min/Max in 24hrs   Temp 99 °F (37.2 °C) Temp  Min: 98.5 °F (36.9 °C)  Max: 99 °F (37.2 °C)   Pulse 96 Pulse  Min: 75  Max: 120   Resp (!) 32 Resp  Min: 12  Max: 50   /60 BP  Min: 83/55  Max: 139/69   O2 Sat 94 % SpO2  Min: 45 %  Max: 100 %      Intake/Output Summary (Last 24 hours) at 11/23/2023 0652  Last data filed at 11/23/2023 0600  Gross per 24 hour   Intake 220 ml   Output 1650 ml   Net -1430 ml       Diet Regular; Regular House    Invasive Monitoring           Physical Exam   Physical Exam  Eyes:      General: Vision grossly intact. Extraocular Movements: Extraocular movements intact. Conjunctiva/sclera: Conjunctivae normal.   HENT:      Head: Normocephalic and atraumatic. Right Ear: Hearing normal.      Left Ear: Hearing normal.      Mouth/Throat:      Mouth: Mucous membranes are moist.   Constitutional:       Appearance: She is ill-appearing. Neurological:      Mental Status: She is alert and oriented to person, place and time. Pt denied Physical examination therefore it was limited        Diagnostic Studies      Imaging: CT chest  I have personally reviewed pertinent reports.        Medications:  Scheduled PRN   [START ON 11/24/2023] acyclovir, 400 mg, BID  amLODIPine, 10 mg, Daily  apixaban, 5 mg, BID  budesonide, 0.5 mg, Q12H  busPIRone, 30 mg, TID  chlorhexidine, 15 mL, Q12H [address]  [START ON 11/28/2023] cyclophosphamide (CYTOXAN) 1,350 mg in sodium chloride 0.9 % 250 mL IVPB, 750 mg/m2 (Treatment Plan Recorded), Once  [START ON 11/24/2023] DOXOrubicin (ADRIAMYCIN) 18 mg, etoposide (TOPOSAR) 67.6 mg, vinCRIStine (ONCOVIN) 0.7 mg in sodium chloride 0.9 % 500 mL infusion, , Q24H  famotidine, 20 mg, BID  [START ON 11/24/2023] fluconazole, 382 mg, Daily  folic acid, 1 mg, Daily  formoterol, 20 mcg, Q12H  [START ON 11/24/2023] fosaprepitant (EMEND) 150 mg in sodium chloride 0.9 % 250 mL IVPB, 150 mg, Once  gabapentin, 300 mg, TID  levalbuterol, 1.25 mg, Q6H   And  ipratropium, 0.5 mg, Q6H  [START ON 11/24/2023] levofloxacin, 250 mg, Q24H [address]  melatonin, 3 mg, HS  metoprolol tartrate, 25 mg, Q12H [address]  nicotine, 7 mg, Daily  [START ON 11/24/2023] ondansetron (ZOFRAN) 16 mg, dexamethasone (DECADRON) 10 mg in sodium chloride 0.9 % 50 mL IVPB, , Q24H  oxyCODONE, 5 mg, Q8H  polyethylene glycol, 17 g, Daily  [START ON 11/24/2023] predniSONE, 60 mg/m2 (Treatment Plan Recorded), BID With Meals  QUEtiapine, 50 mg, HS  senna, 17.6 mg, BID  sertraline, 75 mg, Daily  [START ON 11/28/2023] sodium chloride, 2,000 mL, Once  [START ON 11/24/2023] sulfamethoxazole-trimethoprim, 1 tablet, Once per day on Mon Wed Fri      alteplase, 2 mg, Q1MIN PRN  alteplase, 2 mg, Q1MIN PRN  alteplase, 2 mg, Q1MIN PRN  alteplase, 2 mg, Q1MIN PRN  ondansetron, 4 mg, Q8H PRN  [START ON 11/24/2023] ondansetron, 4 mg, Q8H PRN  oxyCODONE, 2.5 mg, Q6H PRN  [START ON 11/24/2023] sodium chloride, 20 mL/hr, Daily PRN       Continuous          Labs:    CBC    Recent Labs     11/22/23  0701 11/23/23  0524   WBC 6.72 6.25   HGB 8.0* 9.9*   HCT 27.1* 33.1*    229     BMP    Recent Labs     11/22/23  0701 11/23/23  0524   SODIUM 143 144   K 4.2 4.8    106   CO2 31 32   AGAP 5 6   BUN 18 19   CREATININE 1.33* 1.83*   CALCIUM 9.7 9.9       Coags    No recent results     Additional Electrolytes  No recent results       Blood Gas    Recent Labs     11/23/23  0542   PHART 7.357   TQC8HCO 53.4*   PO2ART 36.9*   ZLD1WJC 29.3*   BEART 3.2   SOURCE Line, Venous     Recent Labs     11/23/23  0542   SOURCE Line, Venous    LFTs  No recent results    Infectious  No recent results  Glucose  Recent Labs     11/22/23  0701 11/23/23  0524   GLUC 116 95             Bina Perry MD

## 2023-11-23 NOTE — ASSESSMENT & PLAN NOTE
Large B-cell lymphoma, stage II. First chemo cycle 9/22 4 days of R-EPOCH. 9/27 Rituxan. 9/28 Filgrastim. Received nivestym 300 mc 7 days dcd on 10/26   Discontinue Prophylaxis as per Heme Oncology recommendations     9/22  4 days of R-EPOCH.  9/27 Rituxan  10/13 for [facility] cycle 2, which was done over 4 days and cytoxan on 10/19.  11/6 completed R-EPOCH cycle 3 and Cytoxan 11/7. Plan:  CT neck w wo contrast 11/15 (showed IJ thrombus as above)  Pt will receive her chemotherapy on 11/24 as scheduled regardless of PET scan   Transfuse blood products as needed.   Keep Hgb>7 and Plt>20 for chemo   See acute respiratory failure above

## 2023-11-23 NOTE — ASSESSMENT & PLAN NOTE
Malnutrition Findings:   Adult Malnutrition type: Chronic illness  Adult Degree of Malnutrition: Unspecified protein calorie malnutrition  Malnutrition Characteristics: Inadequate energy, Other (comment) (stress of illness)                  360 Statement: Protein calorie malnutrition r/t inadequate intake as evidenced by >7 day period of poor intakes and stress of infection; currently treated with regular diet and nutrition supplements    BMI Findings: Body mass index is 31.43 kg/m².

## 2023-11-23 NOTE — ASSESSMENT & PLAN NOTE
Recent Labs     11/21/23  0507 11/22/23  0701 11/23/23  0524   HGB 7.7* 8.0* 9.9*       '  Patient received 1 PRBC transfusion on 10/5 and 10/25. Her B/L hemoglobin is 12-14. No sites of bleeding, no black stools. Iron panel indicative of inflammatory anemia. Normal Vitamin B12 levels. Folate low at 5.5. Filgrastim 4 doses administered by heme/onc. Hb is improved to 9.9     Plan:  Trend hemoglobin and transfuse for <7. As per heme/onc transfuse irradiated and leukoreduced blood products. Trend platelets  Folic acid 1mg daily supplementation  As per conversation with Heme/oncology attending, patient pancytopenia associated with chemotherapy is expected/predictable. No further anemia w/u required.   Low ANC management as per heme/onc

## 2023-11-23 NOTE — ASSESSMENT & PLAN NOTE
Chief Complaint   Patient presents with   • Ear Pain     RT ear pain and bleeding . \"I have severe ear infection and tooth infection.\"        SUBJECTIVE:  HPI:   This 56 year old  female presents to Urgent Care for evaluation of bleeding from the right ear. Patient's chief complaint documented by nursing staff was reviewed. Patient reports last night she was massaging the tragus of the right ear and she felt a \"pop\". This morning she noticed a small amount of blood on the inside of her right ear canal. Denies pain. Patient was seen in Urgent Care on 5/26 and treated with Amoxicillin for dental infection and Ciprodex for otitis externa. Patient continues to have pain in the the right jaw from some cracked teeth and TMJ. Patient was scheduled to see the dentist today but the appointment was rescheduled for next week. Reports she also has a cyst in her right maxillary sinus that she will be seeing ENT next week for. Patient has been taking Tylenol for her pain which is not really helping. Patient's concern today is because she has bleeding from the ear.     Review of Systems:  A review of systems was performed and findings relevant to these symptoms are included in the HPI.    Current Outpatient Medications   Medication Sig Dispense Refill   • Blood Pressure Monitoring (Blood Pressure Cuff) Misc Please dispense one automatic blood pressure cuff for patient to use at home. Diagnosis: I10 Essential Hypertension. 1 each 0   • Blood Pressure Monitoring (Blood Pressure Cuff) Misc Please dispense one automatic blood pressure cuff for patient to use at home. Diagnosis: I10 Essential Hypertension. 1 each 0   • clindamycin (CLEOCIN) 300 MG capsule Take 300 mg by mouth 4 times daily.     • ELDERBERRY PO      • Vitamin E (E 400 BLENDED PO)      • Saline 0.65 % Solution      • Red Yeast Rice Extract (RED YEAST RICE PO)      • POTASSIUM PO      • LYSINE PO      • NON FORMULARY Pt states that she takes congnium memory extra strength  Wt Readings from Last 3 Encounters:   11/23/23 73 kg (160 lb 15 oz)   09/07/23 74.6 kg (164 lb 6.4 oz)   08/30/23 73.3 kg (161 lb 9.6 oz)     Lab Results   Component Value Date    LVEF 65 11/21/2023     (H) 10/28/2023     (H) 09/29/2023     Echo 11/21/23: LVEF 65%.        Plan:  K > 4   Measure I/O     • Multiple Vitamins-Minerals (WOMENS MULTI VITAMIN & MINERAL PO)      • UNKNOWN TO PATIENT Natures bounty- stress comfort over the counter gummy     • NON FORMULARY Gold bond diabetic dry skin relief     • Lidocaine HCl (Aspercreme Lidocaine Essential) 4 % Liquid      • NON FORMULARY zarbees baby soothing chest rub     • NON FORMULARY zarbees water flavoring-immune and vitamin C  arbonne essentials digestion plus     • NON FORMULARY Super beets powder     • NON FORMULARY tripple immune power vitamfusion     • NON FORMULARY candidase capsule     • NON FORMULARY Turmeric and ginger     • NON FORMULARY Tens machine     • NON FORMULARY accu-chek Gay plus  Everyday easy     • NON FORMULARY Dry brushing     • clobetasol (TEMOVATE) 0.05 % cream      • diclofenac (VOLTAREN) 1 % gel      • blood glucose test strip Test blood sugar 4 times daily as directed. Diagnosis: E 11.65 meter: accu chem active plus meter 550 each 3   • blood glucose lancets Test blood sugar 4  time daily as directed. Diagnosis: E11.9. Meter:accu chem active plus meter 100 each 3   • diphenhydrAMINE (BENADRYL) 12.5 MG/5ML liquid Take by mouth nightly as needed for Allergies. 1/2 tab PRN     • Insulin Lispro, 1 Unit Dial, (HumaLOG KwikPen) 100 UNIT/ML pen-injector Inject 10 Units into the skin 6 times daily. Please adjust the dose base on pre meal fingerstick Each meal 10 units with each meal (3 to 6 times per day) + add extra coverage depending on you fingerstick. ADD this amount based on pre meal fingerstick: Lispro insulin (short acting insuline) 140 - 180   3 units subcut; 181 - 240   4 units subcut; 241 - 300  6 units subcut ; 301 - 350  8 units subcut 54 mL 3   • insulin glargine (Lantus SoloStar) 100 UNIT/ML pen-injector Inject 22 Units into the skin nightly. Prime 2 units before each dose. (Patient taking differently: Inject 26 Units into the skin nightly. Prime 2 units before each dose.) 20 mL 3   • losartan-hydrochlorothiazide (HYZAAR)  100-25 MG per tablet Take 1 tablet by mouth daily. 30 tablet 11   • Vitamin D, Ergocalciferol, 1.25 mg (50,000 units) capsule Take by mouth every 14 days.      • ondansetron (ZOFRAN ODT) 4 MG disintegrating tablet Place 1 tablet onto the tongue every 8 hours as needed for Nausea. 10 tablet 0   • levalbuterol (XOPENEX HFA) 45 MCG/ACT inhaler Inhale 2 puffs into the lungs every 4 hours as needed for Wheezing or Shortness of Breath. 1 Inhaler 5   • levalbuterol (XOPENEX) 0.63 MG/3ML nebulizer solution Take 3 mLs by nebulization every 4 hours as needed for Wheezing. 576 mL 12   • B Complex-C-Folic Acid (EQL SUPER B COMPLEX/VITAMIN C PO) Take 1 tablet by mouth daily.     • Probiotic Product (PROBIOTIC ADVANCED PO) Take 1 tablet by mouth 2 times daily.     • Menthol, Topical Analgesic, (BIOFREEZE EX) Apply to right knee and lower back.     • Tetrahydrozoline HCl (EYE DROPS OP) Apply 1 drop to eye as needed. Both eyes as needed     • EPINEPHrine (EPIPEN 2-ADARSH IJ)      • nitroGLYcerin (NITRODUR) 0.2 MG/HR patch      • meloxicam (MOBIC) 7.5 MG tablet Take 1 tablet by mouth daily for 14 days. 14 tablet 0   • Blood Glucose Monitoring Suppl (FreeStyle Lite) Device Test blood sugars one time daily. 1 each 0     No current facility-administered medications for this visit.       ALLERGIES:   Allergen Reactions   • Bee Pollen ANAPHYLAXIS   • Codeine SHORTNESS OF BREATH   • Dicyclomine RASH and SHORTNESS OF BREATH   • Diphenhydramine Other (See Comments)     Shakiness and racing heart     • Dulaglutide Other (See Comments), CARDIAC DISTURBANCES, DIARRHEA, DIZZINESS, GI UPSET, Nausea & Vomiting, Palpitations, RASH, Runny Nose, SHORTNESS OF BREATH, VISUAL DISTURBANCE, VOMITING and WEAKNESS     Nausea, vomiting, diarrhea, abdominal pain    • Dust Mite Extract ANAPHYLAXIS   • Dye [Contrast Media] PRURITUS   • Iodinated Diagnostic Agents PRURITUS and RASH   • Milk Protein Extract   (Food Or Med) THROAT SWELLING   • Peanut Oil   (Food Or  Med) THROAT SWELLING   • Sulfa Antibiotics HIVES     Other reaction(s): Hives   • Tramadol GI UPSET     Other reaction(s): GI upset, Nausea/Vomiting   • Unknown PRURITUS   • Acetaminophen Injection [Acetaminophen] NAUSEA   • Albuterol ANXIETY   • Cat Dander Other (See Comments)     sinus   • Cephalosporins Other (See Comments)     unknown   • Clarithromycin Other (See Comments)     Pt reports she has a change in level of conciousness and was hard to arouse on med   • Cyclobenzaprine Other (See Comments)     drowsiness   • Doxycycline Other (See Comments)     Pt states it caused stomach bleeding   • Erythromycin SWELLING     Eye ointment, eyes swelled   • Hydrocodone NAUSEA   • Latex PRURITUS   • Minocycline Other (See Comments)     fatigue   • Molds & Smuts HIVES   • Morphine Other (See Comments)     hallucinations   • Motrin Other (See Comments)     Bleeding stomach ulcers and throat ulceration    • Prednisone Other (See Comments)     Psycosis   • Sumatriptan MYALGIA   • Trichophyton Other (See Comments)     unknown   • Wheat - Food Allergy Other (See Comments)     bloating   • Cat Hair Extract Other (See Comments)   • Propoxyphene N-Apap Other (See Comments)   • Wheat   (Food Or Med) DIARRHEA and Other (See Comments)     bloating  bloating       OBJECTIVE:  Vitals:    06/03/21 0808   BP: (!) 164/98   Pulse: 68   Resp: 20   Temp: 99 °F (37.2 °C)   TempSrc: Oral   Weight: 117.9 kg       Vitals were reviewed.     Physical Exam:  Constitutional: This pleasant 56 year old female presents to Urgent Care in no acute distress. Patient is alert and oriented x3.  Head: Normocephalic, atraumatic.   Ears: Right external auditory canal without redness, swelling or tenderness. Small scab noted close to the entrance, no active bleeding noted. Tympanic membrane clear.    Clinical Course:  Explained to patient that there was a small pimple in the canal that probably popped and that is where the blood came from. Reinforced with  patient that she needs to see the dentist to have her teeth repaired as soon as possible.     ASSESSMENT:  Pimple right external auditory canal    PLAN:  - Continue to monitor for onset of pain or further bleeding.   - May take Tylenol as needed for pain or fever.   - Follow up with dentist as soon as possible.   - Discharge instructions were given verbally.   - Follow up with PCP if symptoms worsen.      GLO Almanzar

## 2023-11-24 ENCOUNTER — APPOINTMENT (INPATIENT)
Dept: RADIOLOGY | Facility: HOSPITAL | Age: 68
DRG: 004 | End: 2023-11-24
Payer: COMMERCIAL

## 2023-11-24 LAB
ABO GROUP BLD: NORMAL
ALBUMIN SERPL BCP-MCNC: 2.8 G/DL (ref 3.5–5)
ALBUMIN SERPL BCP-MCNC: 3.1 G/DL (ref 3.5–5)
ALP SERPL-CCNC: 69 U/L (ref 34–104)
ALP SERPL-CCNC: 77 U/L (ref 34–104)
ALT SERPL W P-5'-P-CCNC: 7 U/L (ref 7–52)
ALT SERPL W P-5'-P-CCNC: 9 U/L (ref 7–52)
ANION GAP SERPL CALCULATED.3IONS-SCNC: 5 MMOL/L
ANION GAP SERPL CALCULATED.3IONS-SCNC: 8 MMOL/L
AST SERPL W P-5'-P-CCNC: 10 U/L (ref 13–39)
AST SERPL W P-5'-P-CCNC: 8 U/L (ref 13–39)
BASE EXCESS BLDA CALC-SCNC: 7 MMOL/L (ref -2–3)
BASOPHILS # BLD AUTO: 0.07 THOUSANDS/ÂΜL (ref 0–0.1)
BASOPHILS NFR BLD AUTO: 1 % (ref 0–1)
BILIRUB SERPL-MCNC: 0.25 MG/DL (ref 0.2–1)
BILIRUB SERPL-MCNC: 0.39 MG/DL (ref 0.2–1)
BLD GP AB SCN SERPL QL: NEGATIVE
BUN SERPL-MCNC: 22 MG/DL (ref 5–25)
BUN SERPL-MCNC: 25 MG/DL (ref 5–25)
CA-I BLD-SCNC: 1.27 MMOL/L (ref 1.12–1.32)
CALCIUM ALBUM COR SERPL-MCNC: 10.1 MG/DL (ref 8.3–10.1)
CALCIUM ALBUM COR SERPL-MCNC: 10.6 MG/DL (ref 8.3–10.1)
CALCIUM SERPL-MCNC: 9.4 MG/DL (ref 8.4–10.2)
CALCIUM SERPL-MCNC: 9.6 MG/DL (ref 8.4–10.2)
CHLORIDE SERPL-SCNC: 105 MMOL/L (ref 96–108)
CHLORIDE SERPL-SCNC: 107 MMOL/L (ref 96–108)
CO2 SERPL-SCNC: 31 MMOL/L (ref 21–32)
CO2 SERPL-SCNC: 32 MMOL/L (ref 21–32)
CREAT SERPL-MCNC: 1.93 MG/DL (ref 0.6–1.3)
CREAT SERPL-MCNC: 2.11 MG/DL (ref 0.6–1.3)
CREAT UR-MCNC: 86.5 MG/DL
EOSINOPHIL # BLD AUTO: 0.16 THOUSAND/ÂΜL (ref 0–0.61)
EOSINOPHIL NFR BLD AUTO: 2 % (ref 0–6)
ERYTHROCYTE [DISTWIDTH] IN BLOOD BY AUTOMATED COUNT: 16.4 % (ref 11.6–15.1)
FIO2 GAS DIL.REBREATH: 100 L
GFR SERPL CREATININE-BSD FRML MDRD: 23 ML/MIN/1.73SQ M
GFR SERPL CREATININE-BSD FRML MDRD: 25 ML/MIN/1.73SQ M
GLUCOSE SERPL-MCNC: 105 MG/DL (ref 65–140)
GLUCOSE SERPL-MCNC: 160 MG/DL (ref 65–140)
GLUCOSE SERPL-MCNC: 99 MG/DL (ref 65–140)
HCO3 BLDA-SCNC: 29.1 MMOL/L (ref 22–28)
HCT VFR BLD AUTO: 25.1 % (ref 34.8–46.1)
HCT VFR BLD CALC: 22 % (ref 34.8–46.1)
HGB BLD-MCNC: 7.4 G/DL (ref 11.5–15.4)
HGB BLDA-MCNC: 7.5 G/DL (ref 11.5–15.4)
IMM GRANULOCYTES # BLD AUTO: 0.11 THOUSAND/UL (ref 0–0.2)
IMM GRANULOCYTES NFR BLD AUTO: 1 % (ref 0–2)
LDH SERPL-CCNC: 161 U/L (ref 140–271)
LYMPHOCYTES # BLD AUTO: 1.11 THOUSANDS/ÂΜL (ref 0.6–4.47)
LYMPHOCYTES NFR BLD AUTO: 14 % (ref 14–44)
MCH RBC QN AUTO: 30 PG (ref 26.8–34.3)
MCHC RBC AUTO-ENTMCNC: 29.5 G/DL (ref 31.4–37.4)
MCV RBC AUTO: 102 FL (ref 82–98)
MONOCYTES # BLD AUTO: 0.81 THOUSAND/ÂΜL (ref 0.17–1.22)
MONOCYTES NFR BLD AUTO: 11 % (ref 4–12)
NEUTROPHILS # BLD AUTO: 5.43 THOUSANDS/ÂΜL (ref 1.85–7.62)
NEUTS SEG NFR BLD AUTO: 71 % (ref 43–75)
NRBC BLD AUTO-RTO: 0 /100 WBCS
PCO2 BLD: 136 MM HG
PCO2 BLD: 29.6 MM HG (ref 36–44)
PCO2 BLD: 30 MMOL/L (ref 21–32)
PCO2 BLDA: 29.6 MM HG
PH BLD: 7.6 [PH]
PH BLD: 7.6 [PH] (ref 7.35–7.45)
PLATELET # BLD AUTO: 273 THOUSANDS/UL (ref 149–390)
PMV BLD AUTO: 9.3 FL (ref 8.9–12.7)
PO2 BLD: 136 MM HG (ref 75–129)
POTASSIUM BLD-SCNC: 4.1 MMOL/L (ref 3.5–5.3)
POTASSIUM SERPL-SCNC: 4.8 MMOL/L (ref 3.5–5.3)
POTASSIUM SERPL-SCNC: 5.3 MMOL/L (ref 3.5–5.3)
PROT SERPL-MCNC: 5.5 G/DL (ref 6.4–8.4)
PROT SERPL-MCNC: 6.3 G/DL (ref 6.4–8.4)
RBC # BLD AUTO: 2.47 MILLION/UL (ref 3.81–5.12)
RH BLD: POSITIVE
SAO2 % BLD FROM PO2: 100 % (ref 60–85)
SODIUM 24H UR-SCNC: 89 MOL/L
SODIUM BLD-SCNC: 136 MMOL/L (ref 136–145)
SODIUM SERPL-SCNC: 144 MMOL/L (ref 135–147)
SODIUM SERPL-SCNC: 144 MMOL/L (ref 135–147)
SPECIMEN EXPIRATION DATE: NORMAL
SPECIMEN SOURCE: ABNORMAL
UUN 24H UR-MCNC: 298 MG/DL
WBC # BLD AUTO: 7.69 THOUSAND/UL (ref 4.31–10.16)

## 2023-11-24 PROCEDURE — 82570 ASSAY OF URINE CREATININE: CPT | Performed by: INTERNAL MEDICINE

## 2023-11-24 PROCEDURE — 84295 ASSAY OF SERUM SODIUM: CPT

## 2023-11-24 PROCEDURE — 80053 COMPREHEN METABOLIC PANEL: CPT | Performed by: INTERNAL MEDICINE

## 2023-11-24 PROCEDURE — 84132 ASSAY OF SERUM POTASSIUM: CPT

## 2023-11-24 PROCEDURE — 82803 BLOOD GASES ANY COMBINATION: CPT

## 2023-11-24 PROCEDURE — 99233 SBSQ HOSP IP/OBS HIGH 50: CPT | Performed by: INTERNAL MEDICINE

## 2023-11-24 PROCEDURE — 94640 AIRWAY INHALATION TREATMENT: CPT

## 2023-11-24 PROCEDURE — 85025 COMPLETE CBC W/AUTO DIFF WBC: CPT | Performed by: INTERNAL MEDICINE

## 2023-11-24 PROCEDURE — P9040 RBC LEUKOREDUCED IRRADIATED: HCPCS

## 2023-11-24 PROCEDURE — 94003 VENT MGMT INPAT SUBQ DAY: CPT

## 2023-11-24 PROCEDURE — 85014 HEMATOCRIT: CPT

## 2023-11-24 PROCEDURE — 86900 BLOOD TYPING SEROLOGIC ABO: CPT

## 2023-11-24 PROCEDURE — 84300 ASSAY OF URINE SODIUM: CPT | Performed by: INTERNAL MEDICINE

## 2023-11-24 PROCEDURE — 80053 COMPREHEN METABOLIC PANEL: CPT

## 2023-11-24 PROCEDURE — 94760 N-INVAS EAR/PLS OXIMETRY 1: CPT

## 2023-11-24 PROCEDURE — 82330 ASSAY OF CALCIUM: CPT

## 2023-11-24 PROCEDURE — 71045 X-RAY EXAM CHEST 1 VIEW: CPT

## 2023-11-24 PROCEDURE — 84540 ASSAY OF URINE/UREA-N: CPT | Performed by: INTERNAL MEDICINE

## 2023-11-24 PROCEDURE — 94150 VITAL CAPACITY TEST: CPT

## 2023-11-24 PROCEDURE — 82947 ASSAY GLUCOSE BLOOD QUANT: CPT

## 2023-11-24 PROCEDURE — 36600 WITHDRAWAL OF ARTERIAL BLOOD: CPT

## 2023-11-24 PROCEDURE — 83615 LACTATE (LD) (LDH) ENZYME: CPT | Performed by: INTERNAL MEDICINE

## 2023-11-24 PROCEDURE — 86901 BLOOD TYPING SEROLOGIC RH(D): CPT

## 2023-11-24 PROCEDURE — 86850 RBC ANTIBODY SCREEN: CPT

## 2023-11-24 PROCEDURE — 86923 COMPATIBILITY TEST ELECTRIC: CPT

## 2023-11-24 RX ORDER — ALLOPURINOL 100 MG/1
200 TABLET ORAL 4 TIMES DAILY
Status: DISCONTINUED | OUTPATIENT
Start: 2023-11-24 | End: 2023-12-04 | Stop reason: HOSPADM

## 2023-11-24 RX ORDER — SODIUM CHLORIDE, SODIUM GLUCONATE, SODIUM ACETATE, POTASSIUM CHLORIDE, MAGNESIUM CHLORIDE, SODIUM PHOSPHATE, DIBASIC, AND POTASSIUM PHOSPHATE .53; .5; .37; .037; .03; .012; .00082 G/100ML; G/100ML; G/100ML; G/100ML; G/100ML; G/100ML; G/100ML
75 INJECTION, SOLUTION INTRAVENOUS CONTINUOUS
Status: DISCONTINUED | OUTPATIENT
Start: 2023-11-24 | End: 2023-11-24

## 2023-11-24 RX ORDER — SODIUM CHLORIDE, SODIUM GLUCONATE, SODIUM ACETATE, POTASSIUM CHLORIDE, MAGNESIUM CHLORIDE, SODIUM PHOSPHATE, DIBASIC, AND POTASSIUM PHOSPHATE .53; .5; .37; .037; .03; .012; .00082 G/100ML; G/100ML; G/100ML; G/100ML; G/100ML; G/100ML; G/100ML
500 INJECTION, SOLUTION INTRAVENOUS ONCE
Status: COMPLETED | OUTPATIENT
Start: 2023-11-24 | End: 2023-11-25

## 2023-11-24 RX ORDER — FUROSEMIDE 10 MG/ML
20 INJECTION INTRAMUSCULAR; INTRAVENOUS ONCE
Status: COMPLETED | OUTPATIENT
Start: 2023-11-24 | End: 2023-11-24

## 2023-11-24 RX ADMIN — QUETIAPINE FUMARATE 50 MG: 25 TABLET ORAL at 21:00

## 2023-11-24 RX ADMIN — ETOPOSIDE: 20 INJECTION, SOLUTION INTRAVENOUS at 10:16

## 2023-11-24 RX ADMIN — LEVALBUTEROL HYDROCHLORIDE 1.25 MG: 1.25 SOLUTION RESPIRATORY (INHALATION) at 20:21

## 2023-11-24 RX ADMIN — OXYCODONE HYDROCHLORIDE 5 MG: 5 SOLUTION ORAL at 03:55

## 2023-11-24 RX ADMIN — METOPROLOL TARTRATE 25 MG: 25 TABLET, FILM COATED ORAL at 20:59

## 2023-11-24 RX ADMIN — SENNOSIDES 17.6 MG: 8.8 SYRUP ORAL at 10:26

## 2023-11-24 RX ADMIN — IPRATROPIUM BROMIDE 0.5 MG: 0.5 SOLUTION RESPIRATORY (INHALATION) at 20:21

## 2023-11-24 RX ADMIN — OXYCODONE HYDROCHLORIDE 5 MG: 5 SOLUTION ORAL at 20:58

## 2023-11-24 RX ADMIN — IPRATROPIUM BROMIDE 0.5 MG: 0.5 SOLUTION RESPIRATORY (INHALATION) at 13:53

## 2023-11-24 RX ADMIN — ALLOPURINOL 200 MG: 100 TABLET ORAL at 12:19

## 2023-11-24 RX ADMIN — LEVOFLOXACIN 250 MG: 250 TABLET, FILM COATED ORAL at 10:01

## 2023-11-24 RX ADMIN — FUROSEMIDE 20 MG: 10 INJECTION, SOLUTION INTRAMUSCULAR; INTRAVENOUS at 16:07

## 2023-11-24 RX ADMIN — MELATONIN 3 MG: at 21:01

## 2023-11-24 RX ADMIN — SENNOSIDES 17.6 MG: 8.8 SYRUP ORAL at 17:04

## 2023-11-24 RX ADMIN — ALLOPURINOL 200 MG: 100 TABLET ORAL at 17:04

## 2023-11-24 RX ADMIN — ONDANSETRON: 2 INJECTION INTRAMUSCULAR; INTRAVENOUS at 09:15

## 2023-11-24 RX ADMIN — BUSPIRONE HYDROCHLORIDE 30 MG: 10 TABLET ORAL at 10:26

## 2023-11-24 RX ADMIN — BUDESONIDE 0.5 MG: 0.5 INHALANT ORAL at 20:21

## 2023-11-24 RX ADMIN — IPRATROPIUM BROMIDE 0.5 MG: 0.5 SOLUTION RESPIRATORY (INHALATION) at 02:36

## 2023-11-24 RX ADMIN — LEVALBUTEROL HYDROCHLORIDE 1.25 MG: 1.25 SOLUTION RESPIRATORY (INHALATION) at 07:29

## 2023-11-24 RX ADMIN — FORMOTEROL FUMARATE DIHYDRATE 20 MCG: 20 SOLUTION RESPIRATORY (INHALATION) at 20:21

## 2023-11-24 RX ADMIN — FOLIC ACID 1 MG: 1 TABLET ORAL at 10:30

## 2023-11-24 RX ADMIN — NICOTINE 7 MG: 7 PATCH, EXTENDED RELEASE TRANSDERMAL at 10:29

## 2023-11-24 RX ADMIN — APIXABAN 5 MG: 5 TABLET, FILM COATED ORAL at 20:59

## 2023-11-24 RX ADMIN — ACYCLOVIR 400 MG: 200 CAPSULE ORAL at 10:01

## 2023-11-24 RX ADMIN — SULFAMETHOXAZOLE AND TRIMETHOPRIM 1 TABLET: 800; 160 TABLET ORAL at 10:01

## 2023-11-24 RX ADMIN — METOPROLOL TARTRATE 25 MG: 25 TABLET, FILM COATED ORAL at 10:29

## 2023-11-24 RX ADMIN — SERTRALINE 75 MG: 25 TABLET, FILM COATED ORAL at 10:30

## 2023-11-24 RX ADMIN — APIXABAN 5 MG: 5 TABLET, FILM COATED ORAL at 10:30

## 2023-11-24 RX ADMIN — IPRATROPIUM BROMIDE 0.5 MG: 0.5 SOLUTION RESPIRATORY (INHALATION) at 07:29

## 2023-11-24 RX ADMIN — LEVALBUTEROL HYDROCHLORIDE 1.25 MG: 1.25 SOLUTION RESPIRATORY (INHALATION) at 02:36

## 2023-11-24 RX ADMIN — CHLORHEXIDINE GLUCONATE 15 ML: 1.2 SOLUTION ORAL at 10:32

## 2023-11-24 RX ADMIN — FOSAPREPITANT DIMEGLUMINE 150 MG: 150 INJECTION, POWDER, LYOPHILIZED, FOR SOLUTION INTRAVENOUS at 09:39

## 2023-11-24 RX ADMIN — LEVALBUTEROL HYDROCHLORIDE 1.25 MG: 1.25 SOLUTION RESPIRATORY (INHALATION) at 13:53

## 2023-11-24 RX ADMIN — SODIUM CHLORIDE, SODIUM GLUCONATE, SODIUM ACETATE, POTASSIUM CHLORIDE, MAGNESIUM CHLORIDE, SODIUM PHOSPHATE, DIBASIC, AND POTASSIUM PHOSPHATE 75 ML/HR: .53; .5; .37; .037; .03; .012; .00082 INJECTION, SOLUTION INTRAVENOUS at 10:00

## 2023-11-24 RX ADMIN — PREDNISONE 100 MG: 50 TABLET ORAL at 17:03

## 2023-11-24 RX ADMIN — GABAPENTIN 300 MG: 300 CAPSULE ORAL at 10:29

## 2023-11-24 RX ADMIN — SODIUM CHLORIDE, SODIUM GLUCONATE, SODIUM ACETATE, POTASSIUM CHLORIDE, MAGNESIUM CHLORIDE, SODIUM PHOSPHATE, DIBASIC, AND POTASSIUM PHOSPHATE 500 ML: .53; .5; .37; .037; .03; .012; .00082 INJECTION, SOLUTION INTRAVENOUS at 08:19

## 2023-11-24 RX ADMIN — GABAPENTIN 300 MG: 300 CAPSULE ORAL at 16:07

## 2023-11-24 RX ADMIN — FAMOTIDINE 20 MG: 20 TABLET, FILM COATED ORAL at 10:30

## 2023-11-24 RX ADMIN — OXYCODONE HYDROCHLORIDE 5 MG: 5 SOLUTION ORAL at 12:19

## 2023-11-24 RX ADMIN — PREDNISONE 100 MG: 50 TABLET ORAL at 10:01

## 2023-11-24 RX ADMIN — BUSPIRONE HYDROCHLORIDE 30 MG: 10 TABLET ORAL at 21:00

## 2023-11-24 RX ADMIN — ALLOPURINOL 200 MG: 100 TABLET ORAL at 21:01

## 2023-11-24 RX ADMIN — FAMOTIDINE 20 MG: 20 TABLET, FILM COATED ORAL at 17:04

## 2023-11-24 RX ADMIN — FORMOTEROL FUMARATE DIHYDRATE 20 MCG: 20 SOLUTION RESPIRATORY (INHALATION) at 07:29

## 2023-11-24 RX ADMIN — ACYCLOVIR 400 MG: 200 CAPSULE ORAL at 17:04

## 2023-11-24 RX ADMIN — FLUCONAZOLE 200 MG: 100 TABLET ORAL at 10:01

## 2023-11-24 RX ADMIN — CHLORHEXIDINE GLUCONATE 15 ML: 1.2 SOLUTION ORAL at 21:00

## 2023-11-24 RX ADMIN — GABAPENTIN 300 MG: 300 CAPSULE ORAL at 21:00

## 2023-11-24 RX ADMIN — BUDESONIDE 0.5 MG: 0.5 INHALANT ORAL at 07:29

## 2023-11-24 RX ADMIN — AMLODIPINE BESYLATE 10 MG: 5 TABLET ORAL at 10:30

## 2023-11-24 RX ADMIN — SODIUM CHLORIDE 20 ML/HR: 0.9 INJECTION, SOLUTION INTRAVENOUS at 09:10

## 2023-11-24 RX ADMIN — BUSPIRONE HYDROCHLORIDE 30 MG: 10 TABLET ORAL at 16:07

## 2023-11-24 NOTE — PROGRESS NOTES
[address] [facility]  Progress Note  Name: Mike Ardon  MRN: [id number]  Unit/Bed#: ICU 03 I Date of Admission: 9/13/2023   Date of Service: 11/24/2023 I Hospital Day: 67    Assessment/Plan   * Acute respiratory failure with hypoxia secondary to oropharyngeal mass  Assessment & Plan  Cuffless trach placed 9/17, transitioned to cuffed on 10/3. Oxygen requirements not improving. For mental health patient is on buspirone, quetiapine, and sertraline. For pain, gabapentin, oxycodone scheduled and prn, prn oxycodone mainly utilized for increased pain during chemo and after a bronch. On Guaifenesin, Atrovent and Xopenex for airway maintenance. No improvement in bilateral consolidation or atelectasis and groundglass opacities on repeat CT and chest x-rays. Bronchial cx with 3 colonies CoNS. CTCAP 10/12:  indicates additional groundglass opacity with paraseptal emphysema, which may be contributing to inability to remain off vent. Bronchoscopy performed and unremarkable. Samples sent to lab for culture. Patient was placed back on vent s/p procedure. Now on high flow. Completed 7 day course of cefepime and Vancomycin. Bronchial culture and gram stain negative. Micro negative for CMV, AFB, Fungitell, Pneumocystis jiroveci (carinii), Histoplasma, Aspergillus. Trach secretions sent for sputum culture shows 2+ polys, 1+ gram-positive cocci in pairs, chains and clusters and 1+ gram-negative rods. 11/14 Discussion regarding possibilities of discharge done with family. Family shows understanding of patient's condition. 11/15 Trach is capped and patient is placed on 3 L of NC briefly for 2 days. Patient is back on trach collar at high flow and NC. Cuffless trach was placed 9/17.  Replaced with a cuffed Shiley XLT #7 10/3  Pt was on BiPAP via vent for the past 2 nights, tolerating it well   Currently on HFNC on 50 L of oxygen     ECHO 11/21/23 - EF 65%, no patent foramen ovale      11/22 - As per plan, pt was on trach collar with FiO2 at 50%, however pt 2 episodes of hypoxia without any trigger. The episode was associated with tachypnea and dyspnea, SpO2 dropping as low as 50% for 20 mins. Suctioning, positional changes, coughing, were done in attempt to increase SpO2 however had no effect on the pt's condition. FiO2 was increased from 50% to 70% and eventually to 100%, which increased the SpO2 to 85%. Pt is currently on non breather mask. 11/23 - Pt had one episode of hypoxia associated with tachypnea and dyspnea last night secondary to off target BiPAP settings. SpO2 were dropped as low as 69%. When BiPAP settings were corrected (EPAP 8, PEEP 8, PSV 8, FiO2 70) pt's SpO2 improved in the 90s. Pt is currently on 15 L mid flow via nasal canula. CT chest showed Infiltrates in the lingula and left lower lobe. Improved from prior study     11/24 - Pt had no episodes of hypoxia overnight. Saturated well in 90s on BiPAP via vent. Plan:  Continue to titrate O2 to maintain SpO2 >85%  Aggressive pulmonary toilet   Incentive Spirometry   Suctioning via trach if patient is desatting and unable to expectorate phlegm. Continue inhalation treatment with Pulmicort and formoterol q12 , duonebs QID, atrovent and xopenex   Regular diet  Daily PT/OT      Large cell lymphoma (HCC)  Assessment & Plan  Large B-cell lymphoma, stage II. First chemo cycle 9/22 4 days of R-EPOCH. 9/27 Rituxan. 9/28 Filgrastim. Received nivestym 300 mc 7 days dcd on 10/26   Discontinue Prophylaxis as per Heme Oncology recommendations     9/22  4 days of R-EPOCH.  9/27 Rituxan  10/13 for [facility] cycle 2, which was done over 4 days and cytoxan on 10/19.  11/6 completed R-EPOCH cycle 3 and Cytoxan 11/7. Plan:  CT neck w wo contrast 11/15 (showed IJ thrombus as above)  Pt will receive her chemotherapy today (11/24)   Transfuse blood products as needed.   Keep Hgb>7 and Plt>20 for chemo   See acute respiratory failure above    Acute embolism and thrombosis of right internal jugular vein (HCC)  Assessment & Plan  CT soft tissues of the neck: Nonocclusive thrombus noted within the right internal jugular vein just above the Mediport entrance site into the internal jugular vein. No signs of pain, headaches, vision changes. Plan:  Eliquis 5 mg     Oropharyngeal mass  Assessment & Plan  9/14 Neck/ soft tissue CT: Large enhancing soft tissue mass identified within the left neck involving multiple spaces. Centered within the left oropharynx with extensions as described above into multiple spaces. This results in significant airway compromise. 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 Small level 3/level 4 nodes are seen within the neck. Tracheostomy by ENT. Replaced on 9/26. H/o tobacco abuse, daily smoker. On nicotine while inpatient, confirmed with patient she needs nicotine patch. CT soft tissue neck shows reduced mass effect from 9/14 to 10/13. Can consider reducing trach size if continues to improve. Patient often refusing peg tube feeds but is feeding herself small meals orally. Plan:  ENT and heme onc following  Pt will be receiving her 4th cycle of chemotherapy today (11/24)  As per speech therapist diet  Dysphagia 2. Continue parallel PEG tube feeds for nutrition. See acute respiratory failure and large B cell lymphoma above. Pancytopenia due to chemotherapy [facility])  Assessment & Plan  Recent Labs     11/22/23  0701 11/23/23  0524 11/24/23  0505   HGB 8.0* 9.9* 7.4*       '  Patient received 1 PRBC transfusion on 10/5 and 10/25. Her B/L hemoglobin is 12-14. No sites of bleeding, no black stools. Iron panel indicative of inflammatory anemia. Normal Vitamin B12 levels. Folate low at 5.5. Filgrastim 4 doses administered by heme/onc. Hb declined from 9.9 to 7.4     Plan:  Trend hemoglobin and transfuse for <7. As per heme/onc transfuse irradiated and leukoreduced blood products.   Trend platelets  Folic acid 1mg daily supplementation  As per conversation with Heme/oncology attending, patient pancytopenia associated with chemotherapy is expected/predictable. No further anemia w/u required. Low ANC management as per heme/onc    Blister (nonthermal) of left forearm, sequela  Assessment & Plan  Blisters of left upper extremity healing, no signs of infection, continue daily wound care. (HFpEF) heart failure with preserved ejection fraction [facility])  Assessment & Plan  Wt Readings from Last 3 Encounters:   11/24/23 72.6 kg (160 lb 0.9 oz)   09/07/23 74.6 kg (164 lb 6.4 oz)   08/30/23 73.3 kg (161 lb 9.6 oz)     Lab Results   Component Value Date    LVEF 65 11/21/2023     (H) 10/28/2023     (H) 09/29/2023     Echo 11/21/23: LVEF 65%. Plan:  K > 4   Measure I/O      Ambulatory dysfunction  Assessment & Plan  2/2 Impacted by chronic pain syndrome + prolonged hospital stay. Continue OOB with PT. PT rec post acute rehab however reportedly Cannot get LTACH placement while getting chemo per CM  She is aware STR SNFs continue to follow and treatment can be done from a SNF level of care. PT would need to be on trach collar for at least 72 hours with no need for the vent. Aware that pt would need to be around 28% FiO2     Depression  Assessment & Plan  Patient on Zoloft 75 daily and Seroquel hs  Patient is depressed, multiple family/palliative discussions. patient is firm that she wants to live and to fight, she is just tired. Currently goal is disease treatment oriented. Full code. No SI/HI ideations. Palliative signed off, will reconsult if patient changes her mind regarding wishes. Chronic Superficial thrombophlebitis  Assessment & Plan  Right forearm soreness. RUE US: No acute or chronic deep vein thrombosis. 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 PO not severe enough to require renal dosing at this time, will continue to monitor and adjust dosing as needed.       Continue DVT ppx with Eliquis     CKD (chronic kidney disease) stage 3, GFR 30-59 ml/min [facility])  Assessment & Plan  Lab Results   Component Value Date    EGFR 23 11/24/2023    EGFR 27 11/23/2023    EGFR 40 11/22/2023    CREATININE 2.11 (H) 11/24/2023    CREATININE 1.83 (H) 11/23/2023    CREATININE 1.33 (H) 11/22/2023     Baseline Cr between 0.75-1.1  Initial HERIBERTO felt 2/2 prerenal azotemia 2/2 diuresis, reduced PO intake +/- ATN. Patient received 2 doses of Bumex IV 2 g as she had not been responding appropriately to IV 40 Lasix daily. Creatinine went up by 0.5 meeting criteria for an HERIBERTO. Suspect most likely due to medications as a combination of prerenal and intrinsic. FENa was 0.2%, UA shows no casts or signs of infection, urine eosinophils 0%. Patient likely has prerenal HERIBERTO from volume depletion. Received 2 L NS and 1 pRBC and cr went up by 0.07 still and Na and Cl went down, suspect that volume prevented further cr rise and free water excretion impaired by kidney function, allowing hyponatremia to increase. Suspect that now that nephrotoxic medications have been stopped she responded well to Lasix and creatinine downtrended. HERIBERTO now resolved. Will be cautious about nephrotoxins and monitor as restarting EPOCH and ppx. Patient gets volume depleted and overloaded very easily. Especially from chemo. 10/19 Heriberto as cr went up by 0.3 in 48 hours from 0.82 on 10/17 to 1.22 on 10/19. Suspect this is prerenal hypovolemic, will see if patient improves with fluids. She gets overloaded easily so if no improvement suspect CRS again. 10/29 HERIBERTO cr went up by 0.3 again. She has 922 ml UOP in last 24 hours. HERIBERTO likely from lasix 40mg IV given yesterday. She is not hypovolemic.    11/17 HERIBERTO creatinine up to 1.8. Isolyte bolus 500 given. Plan:   Creatinine up trended to 1.31 today  Continue to monitor UO/renal function. Avoid nephrotoxic agents  Follow up with BMP in the morning   Continue to monitor a.m. BMP.     Pain syndrome, chronic  Assessment & Plan  Home regimen: Oxycodone 5 mg BID, Tylenol 650 mg q8 prn. Gabapentin 600 mg TID. Medical marijuana. Lumbar radiculopathy and impaired ambulatory dysfunction secondary to pain. Has bowel regimen and is having bowel movements daily. Patient required additional pain management during previous cycle and has some additional pain from tunneled line placement    Plan:  Continue gabapentin 300 mg TID, oxycodone 5 mg TID, prn Tylenol 650 mg q6. Oxycodone 5mg every 8 hours  Oxycodone 2.5 mg q6 PRN    Benign essential hypertension  Assessment & Plan  Home regimen Coreg 12.5 mg BID, Amlodipine 10 mg daily, and lisinopril 40 mg. All discontinued at this time secondary to hypotension and PO since cycle 1. but stable currently without any antihypertensives. Systolic persistently elevated and tachycardic, potentially secondary to pain. Patient was more hypotensive during last chemo. Coreg contraindicated during chemo, since cycles are every other week, would be better to stick with lopressor during duration of therapy as opposed to switching constantly. Plan:  Monitor vitals. Continue Norvasc 10 and lopressor 25 bid    Protein-calorie malnutrition ([address])  Assessment & Plan  Malnutrition Findings:   Adult Malnutrition type: Chronic illness  Adult Degree of Malnutrition: Unspecified protein calorie malnutrition  Malnutrition Characteristics: Inadequate energy, Other (comment) (stress of illness)                  360 Statement: Protein calorie malnutrition r/t inadequate intake as evidenced by >7 day period of poor intakes and stress of infection; currently treated with regular diet and nutrition supplements    BMI Findings: Body mass index is 31.26 kg/m². Disposition: Stepdown Level 1    ICU Core Measures     Vented Patient  VAP Bundle  VAP bundle ordered     A: Assess, Prevent, and Manage Pain Has pain been assessed? Yes  Need for changes to pain regimen?  No   B: Both Spontaneous Awakening Trials (SATs) and Spontaneous Breathing Trials (SBTs) Plan to perform spontaneous awakening trial today? N/A   Plan to perform spontaneous breathing trial today? N/A   Obvious barriers to extubation? NA   C: Choice of Sedation RASS Goal: 0 Alert and Calm  Need for changes to sedation or analgesia regimen? No   D: Delirium CAM-ICU: Negative   E: Early Mobility  Plan for early mobility? NA   F: Family Engagement Plan for family engagement today? NA       Antibiotic Review: Patient on appropriate coverage based on culture data. Review of Invasive Devices:      Central access plan: Medications requiring central line      Prophylaxis:  VTE VTE covered by:  apixaban, Oral, 5 mg at 11/23/23 2105       Stress Ulcer  covered byfamotidine (PEPCID) tablet 20 mg [[id number]]         Significant 24hr Events     24hr events: Pt had 2 events of desaturation of 81% and 85% around 5 and 6 pm yesterday on 12L/min on aerosol mask without any symptoms. Pt saturated well on BiPAP via vent with appropriate setting overnight. Pt is currently on 12L/min via trach mask. Subjective   Review of Systems   Constitutional: Negative. HENT:  Positive for voice change. Respiratory:  Positive for cough. Cardiovascular: Negative. Gastrointestinal: Negative. Endocrine: Negative. Genitourinary: Negative. Musculoskeletal: Negative. Skin: Negative. Allergic/Immunologic: Negative. Neurological: Negative. Hematological: Negative. Psychiatric/Behavioral: Negative.         Objective                            Vitals I/O      Most Recent Min/Max in 24hrs   Temp 97.6 °F (36.4 °C) Temp  Min: 97.6 °F (36.4 °C)  Max: 99.7 °F (37.6 °C)   Pulse 79 Pulse  Min: 79  Max: 118   Resp 20 Resp  Min: 15  Max: 20   /71 BP  Min: 99/63  Max: 146/82   O2 Sat 91 % SpO2  Min: 81 %  Max: 100 %      Intake/Output Summary (Last 24 hours) at 11/24/2023 0656  Last data filed at 11/23/2023 2200  Gross per 24 hour Intake 580 ml   Output 300 ml   Net 280 ml       Diet Regular; Regular House    Invasive Monitoring           Physical Exam   Physical Exam  Eyes:      General: Vision grossly intact. Extraocular Movements: Extraocular movements intact. Conjunctiva/sclera: Conjunctivae normal.   HENT:      Head: Normocephalic and atraumatic. Right Ear: Hearing normal.      Left Ear: Hearing normal.      Mouth/Throat:      Mouth: Mucous membranes are moist.   Neck:      Trachea: Tracheostomy present. Comments: tunneled DL RIJ catheter w/o purulence  Constitutional:       Appearance: She is ill-appearing. Neurological:      Mental Status: She is alert and oriented to person, place and time.      Pt denied Physical examination therefore it was limited       Diagnostic Studies      Imaging: No imaging done in the past 24 hrs      Medications:  Scheduled PRN   acyclovir, 400 mg, BID  amLODIPine, 10 mg, Daily  apixaban, 5 mg, BID  budesonide, 0.5 mg, Q12H  busPIRone, 30 mg, TID  chlorhexidine, 15 mL, Q12H [address]  [START ON 11/28/2023] cyclophosphamide (CYTOXAN) 1,350 mg in sodium chloride 0.9 % 250 mL IVPB, 750 mg/m2 (Treatment Plan Recorded), Once  DOXOrubicin (ADRIAMYCIN) 18 mg, etoposide (TOPOSAR) 67.6 mg, vinCRIStine (ONCOVIN) 0.7 mg in sodium chloride 0.9 % 500 mL infusion, , Q24H  famotidine, 20 mg, BID  fluconazole, 742 mg, Daily  folic acid, 1 mg, Daily  formoterol, 20 mcg, Q12H  fosaprepitant (EMEND) 150 mg in sodium chloride 0.9 % 250 mL IVPB, 150 mg, Once  gabapentin, 300 mg, TID  levalbuterol, 1.25 mg, Q6H   And  ipratropium, 0.5 mg, Q6H  levofloxacin, 250 mg, Q24H [address]  melatonin, 3 mg, HS  metoprolol tartrate, 25 mg, Q12H [address]  nicotine, 7 mg, Daily  ondansetron (ZOFRAN) 16 mg, dexamethasone (DECADRON) 10 mg in sodium chloride 0.9 % 50 mL IVPB, , Q24H  oxyCODONE, 5 mg, Q8H  polyethylene glycol, 17 g, Daily  predniSONE, 60 mg/m2 (Treatment Plan Recorded), BID With Meals  QUEtiapine, 50 mg, HS  senna, 17.6 mg, BID  sertraline, 75 mg, Daily  [START ON 11/28/2023] sodium chloride, 2,000 mL, Once  sulfamethoxazole-trimethoprim, 1 tablet, Once per day on Mon Wed Fri      alteplase, 2 mg, Q1MIN PRN  alteplase, 2 mg, Q1MIN PRN  alteplase, 2 mg, Q1MIN PRN  alteplase, 2 mg, Q1MIN PRN  ondansetron, 4 mg, Q8H PRN  ondansetron, 4 mg, Q8H PRN  oxyCODONE, 2.5 mg, Q6H PRN  sodium chloride, 20 mL/hr, Daily PRN       Continuous          Labs:    CBC    Recent Labs     11/23/23  0524 11/24/23  0505   WBC 6.25 7.69   HGB 9.9* 7.4*   HCT 33.1* 25.1*    273     BMP    Recent Labs     11/23/23  0524 11/24/23  0505   SODIUM 144 144   K 4.8 4.8    107   CO2 32 32   AGAP 6 5   BUN 19 22   CREATININE 1.83* 2.11*   CALCIUM 9.9 9.6       Coags    No recent results     Additional Electrolytes  No recent results       Blood Gas    Recent Labs     11/23/23  0542   PHART 7.357   [id number] 53.4*   PO2ART 36.9*   [id number] 29.3*   BEART 3.2   SOURCE Line, Venous     Recent Labs     11/23/23  0542   SOURCE Line, Venous    LFTs  Recent Labs     11/24/23  0505   ALT 7   AST 8*   ALKPHOS 69   ALB 2.8*   TBILI 0.25       Infectious  No recent results  Glucose  Recent Labs     11/22/23  0701 11/23/23  0524 11/24/23  0505   GLUC 116 95 99               Sunshine Eden MD

## 2023-11-24 NOTE — ASSESSMENT & PLAN NOTE
Recent Labs     11/22/23  0701 11/23/23  0524 11/24/23  0505   HGB 8.0* 9.9* 7.4*       '  Patient received 1 PRBC transfusion on 10/5 and 10/25. Her B/L hemoglobin is 12-14. No sites of bleeding, no black stools. Iron panel indicative of inflammatory anemia. Normal Vitamin B12 levels. Folate low at 5.5. Filgrastim 4 doses administered by heme/onc. Hb declined from 9.9 to 7.4     Plan:  Trend hemoglobin and transfuse for <7. As per heme/onc transfuse irradiated and leukoreduced blood products. Trend platelets  Folic acid 1mg daily supplementation  As per conversation with Heme/oncology attending, patient pancytopenia associated with chemotherapy is expected/predictable. No further anemia w/u required.   Low ANC management as per heme/onc

## 2023-11-24 NOTE — ASSESSMENT & PLAN NOTE
Lab Results   Component Value Date    EGFR 23 11/24/2023    EGFR 27 11/23/2023    EGFR 40 11/22/2023    CREATININE 2.11 (H) 11/24/2023    CREATININE 1.83 (H) 11/23/2023    CREATININE 1.33 (H) 11/22/2023     Baseline Cr between 0.75-1.1  Initial HERIBERTO felt 2/2 prerenal azotemia 2/2 diuresis, reduced PO intake +/- ATN. Patient received 2 doses of Bumex IV 2 g as she had not been responding appropriately to IV 40 Lasix daily. Creatinine went up by 0.5 meeting criteria for an HERIBERTO. Suspect most likely due to medications as a combination of prerenal and intrinsic. FENa was 0.2%, UA shows no casts or signs of infection, urine eosinophils 0%. Patient likely has prerenal HERIBERTO from volume depletion. Received 2 L NS and 1 pRBC and cr went up by 0.07 still and Na and Cl went down, suspect that volume prevented further cr rise and free water excretion impaired by kidney function, allowing hyponatremia to increase. Suspect that now that nephrotoxic medications have been stopped she responded well to Lasix and creatinine downtrended. HERIBERTO now resolved. Will be cautious about nephrotoxins and monitor as restarting EPOCH and ppx. Patient gets volume depleted and overloaded very easily. Especially from chemo. 10/19 Heriberto as cr went up by 0.3 in 48 hours from 0.82 on 10/17 to 1.22 on 10/19. Suspect this is prerenal hypovolemic, will see if patient improves with fluids. She gets overloaded easily so if no improvement suspect CRS again. 10/29 HERIBERTO cr went up by 0.3 again. She has 922 ml UOP in last 24 hours. HERIBERTO likely from lasix 40mg IV given yesterday. She is not hypovolemic.    11/17 HERIBERTO creatinine up to 1.8. Isolyte bolus 500 given. Plan:   Creatinine up trended to 1.31 today  Continue to monitor UO/renal function. Avoid nephrotoxic agents  Follow up with BMP in the morning   Continue to monitor a.m. BMP.

## 2023-11-24 NOTE — PROGRESS NOTES
Medical Oncology/Hematology Progress Note  Jaron Vazquez, female, 77 y. o., 1955,  ICU 03/ICU 03, [id number]     Assessment and Plan  1. 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 There is improved extension of disease into the left infratemporal fossa and  space as well as prevertebral space. S/p EPOCH-R x 3 , today planned to start cycle 4 (of total 6 cycles) , already renally adjusted , confirmed with pharmacy and will proceed (see PO A&P)   On prophylactic Acyclovir, Bactrim and Levaquin   Monitor for TLS , daily CBC, CMP, LD ; Uric acid was 7.6 (11/23/23), recommend restarting allopurinol 200 mg QD     2. Macrocytic Anemia   Hgb 7.4,    Had low folic acid, continue with supplementation     3. Respiratory Failure   CC team following and managing ; O2 requirements decreased 6-8 L this morning       4. Acute Kidney Injury Superimposed on CKD  Suspected d/t dehydration ; had lasix on 11/22 ; last bactrim was given on 11/20   Has 500 cc bolus fluid planned this morning and discussed with cc team, optimizing and avoiding nephrotoxic agents   Renally adjusted chemo as  above , confirmed with pharmacy     5. Hypercalcemia   Mild hypercalcemia, corrected Ca 10.6 ; IV fluids       Outpatient follow up plan: TBD      Communication with patient/family:  I did update the patient, as well as his daughter on the phone      Communication with team:  Did communicate with CC team, Dr. Kati Rodriguez above plan including recs to restart Allopurinol, IV fluids as appropriate & monitor for TLS     I did review this patient with Dr. Julius Wellington and they are in agreement with this plan.       Subjective:  Patient seen and examined this morning, she is sitting in the bed comfortable in no acute distress, speaking in complete sentences, without exertion,  AOx3, she denies any new symptoms noting that cough only occasional/improved, she called her daughter on the phone during the visit, who is updated and agree to the plan as listed above, including starting cycle 4 of chemotherapy today    Elevated creatinine noted discussed with critical care team, bolus of IV fluids is planned to be given this morning prior to starting chemotherapy which is already renally adjusted and confirmed with pharmacy and staff. Review of Systems  - GENERAL: Negative for any nausea, vomiting, fevers, chills, or weight loss. - HEENT: Negative for any head/Neck trauma, pain, double/blurry vision, sinusitis, rhinitis, nose bleeding.  - CARDIAC: Negative for any chest pain, palpitation, Dyspnea on exertion, peripheral edema. - PULMONARY: only occasional cough with some mucus secretion from the trach; Negative for any SOB now on 6-8 L O2,, no wheezing.   - GASTROINTESTINAL: Negative for any abdominal pain, N/V/D/C, blood in stool.   - GENITOURINARY: Negative for any dysuria, hematuria, incontinence.  - NEUROLOGIC: Negative for any muscle weakness, numbness/tingling, memory changes. - MUSCULOSKELETAL: Negative for any joint pains/swelling, limited ROM. - INTEGUMENTARY: Negative for any rashes, cuts/ lesions.  - HEMATOLOGIC: Negative for any abnormal bruising, frequent infections or bleeding.        Objective:     Medication Administration - last 24 hours from 11/23/2023 0708 to 11/24/2023 0708         Date/Time Order Dose Route Action Action by     11/23/2023 2105 EST chlorhexidine (PERIDEX) 0.12 % oral rinse 15 mL 15 mL Mouth/Throat Given Akanksha Barrera RN     11/23/2023 9706 EST chlorhexidine (PERIDEX) 0.12 % oral rinse 15 mL 15 mL Mouth/Throat Given Kandi Martinez RN     11/23/2023 2106 EST gabapentin (NEURONTIN) capsule 300 mg 300 mg Oral Given Akanksha Barrera RN     11/23/2023 8859 EST gabapentin (NEURONTIN) capsule 300 mg 300 mg Oral Given Barbara Dorsey, RN     11/23/2023 4091 EST gabapentin (NEURONTIN) capsule 300 mg 300 mg Oral Given Zainab Laureano, RN     11/23/2023 1757 EST famotidine (PEPCID) tablet 20 mg 20 mg Oral Given Barbara Dorsey, RN     11/23/2023 6868 EST famotidine (PEPCID) tablet 20 mg 20 mg Oral Given Barbara Dorsey, RN     11/23/2023 3143 EST sertraline (ZOLOFT) tablet 75 mg 75 mg Per PEG Tube Given Zainab Laureano, RN     11/23/2023 2105 EST QUEtiapine (SEROquel) tablet 50 mg 50 mg Oral Given Fabrice Gastelum, RN     11/23/2023 0827 EST nicotine (NICODERM CQ) 7 mg/24hr TD 24 hr patch 7 mg 7 mg Transdermal Medication Applied Zainab Georgi, RN     11/23/2023 0821 EST nicotine (NICODERM CQ) 7 mg/24hr TD 24 hr patch 7 mg 7 mg Transdermal Patch Removed Zainab Laureano, RN     11/24/2023 0355 EST oxyCODONE (ROXICODONE) oral solution 5 mg 5 mg Oral Given Camille Mew     11/23/2023 2105 EST oxyCODONE (ROXICODONE) oral solution 5 mg 5 mg Oral Given Fabrice Gastelum, RN     11/23/2023 1215 EST oxyCODONE (ROXICODONE) oral solution 5 mg 5 mg Oral Given Zainab Laureano, RN     11/23/2023 0829 EST amLODIPine (NORVASC) tablet 10 mg 10 mg Oral Not Given Barbara Dorsey, RN     11/23/2023 2105 EST metoprolol tartrate (LOPRESSOR) tablet 25 mg 25 mg Oral Given Fabrice Gastelum, RN     11/23/2023 0829 EST metoprolol tartrate (LOPRESSOR) tablet 25 mg 25 mg Oral Not Given Barbara Dorsey, RN     11/23/2023 2105 EST melatonin tablet 3 mg 3 mg Oral Given Fabrice Gastelum, RN     47/11/1312 7892 EST folic acid (FOLVITE) tablet 1 mg 1 mg Oral Given Zainab Laureano, RN     11/23/2023 2105 EST busPIRone (BUSPAR) tablet 30 mg 30 mg Oral Given Fabrice Gastelum, RN     11/23/2023 1757 EST busPIRone (BUSPAR) tablet 30 mg 30 mg Oral Given Zainab Laureano, RN     11/23/2023 0821 EST busPIRone (BUSPAR) tablet 30 mg 30 mg Oral Given Barbara Dorsey, RN     11/24/2023 0236 EST levalbuterol Gareth Dare) inhalation solution 1.25 mg 1.25 mg Nebulization Given [place] Olszewski, RT     11/23/2023 2040 EST levalbuterol (Parisa Satchel) inhalation solution 1.25 mg 1.25 mg Nebulization Given [place] Olszewski, RT     11/23/2023 1431 EST levalbuterol (XOPENEX) inhalation solution 1.25 mg 1.25 mg Nebulization Given Marlene Coup, RT     11/23/2023 0758 EST levalbuterol (XOPENEX) inhalation solution 1.25 mg 1.25 mg Nebulization Given Marlene Coup, RT     11/24/2023 0236 EST ipratropium (ATROVENT) 0.02 % inhalation solution 0.5 mg 0.5 mg Nebulization Given [place] Olszewski, RT     11/23/2023 2040 EST ipratropium (ATROVENT) 0.02 % inhalation solution 0.5 mg 0.5 mg Nebulization Given [place] Olszewski, RT     11/23/2023 1431 EST ipratropium (ATROVENT) 0.02 % inhalation solution 0.5 mg 0.5 mg Nebulization Given Marlene Coup, RT     11/23/2023 0758 EST ipratropium (ATROVENT) 0.02 % inhalation solution 0.5 mg 0.5 mg Nebulization Given Marlene Coup, RT     11/23/2023 2040 EST budesonide (PULMICORT) inhalation solution 0.5 mg 0.5 mg Nebulization Given [place] Olszewski, RT     11/23/2023 0758 EST budesonide (PULMICORT) inhalation solution 0.5 mg 0.5 mg Nebulization Given Marlene Coup, RT     11/23/2023 2040 EST formoterol (PERFOROMIST) nebulizer solution 20 mcg 20 mcg Nebulization Given [place] Olszewski, RT     11/23/2023 0758 EST formoterol (PERFOROMIST) nebulizer solution 20 mcg 20 mcg Nebulization Given Marlene Coup, RT     11/23/2023 1708 EST senna oral syrup 17.6 mg 17.6 mg Oral Not Given Sandra Ashby, RN     11/23/2023 1537 EST senna oral syrup 17.6 mg 17.6 mg Oral Not Given Sandra Ashby RN     11/23/2023 0829 EST polyethylene glycol (MIRALAX) packet 17 g 17 g Oral Not Given Sandra Ashby RN     11/23/2023 2105 EST apixaban (ELIQUIS) tablet 5 mg 5 mg Oral Given Mamta Scales RN     11/23/2023 9904 EST apixaban (ELIQUIS) tablet 5 mg 5 mg Oral Given Zainab Laureano RN            /66 (BP Location: Left arm)   Pulse 85   Temp 98.6 °F (37 °C) (Oral)   Resp 18   Ht 5' (1.524 m)   Wt 72.6 kg (160 lb 0.9 oz)   SpO2 96%   BMI 31.26 kg/m²       Physical Exam  - GEN: Appears well, sitting in bed comfortable, talking in complete sentences,  alert and oriented x 3, pleasant and cooperative, in no acute distress  - HEENT: Anicteric, mucous membranes moist, PERRL and EOMI   - NECK: No lymphadenopathy, JVD or carotid bruits   - HEART: RRR, normal S1 and S2, no murmurs, clicks, gallops or rubs   - LUNGS: Clear to auscultation bilaterally; no wheezes, rales, or rhonchi  - ABDOMEN: Normal bowel sounds, soft, no tenderness, no distention, no organomegaly or masses felt on exam.   - EXTREMITIES: Peripheral pulses normal; no clubbing, cyanosis, or edema  - NEURO: No focal findings, CN II-XII are grossly intact. - Musculoskeletal: 5/5 strength, normal ROM, no swollen or erythematous joints.    - SKIN: Normal without suspicious lesions on exposed skin      Recent Results (from the past 48 hour(s))   LD,Blood    Collection Time: 11/23/23  5:24 AM   Result Value Ref Range     140 - 271 U/L   Uric acid    Collection Time: 11/23/23  5:24 AM   Result Value Ref Range    Uric Acid 7.6 (H) 2.0 - 7.5 mg/dL   CBC and Platelet    Collection Time: 11/23/23  5:24 AM   Result Value Ref Range    WBC 6.25 4.31 - 10.16 Thousand/uL    RBC 3.32 (L) 3.81 - 5.12 Million/uL    Hemoglobin 9.9 (L) 11.5 - 15.4 g/dL    Hematocrit 33.1 (L) 34.8 - 46.1 %     (H) 82 - 98 fL    MCH 29.8 26.8 - 34.3 pg    MCHC 29.9 (L) 31.4 - 37.4 g/dL    RDW 16.4 (H) 11.6 - 15.1 %    Platelets 983 798 - 036 Thousands/uL    MPV 9.6 8.9 - 12.7 fL   Basic metabolic panel    Collection Time: 11/23/23  5:24 AM   Result Value Ref Range    Sodium 144 135 - 147 mmol/L    Potassium 4.8 3.5 - 5.3 mmol/L    Chloride 106 96 - 108 mmol/L    CO2 32 21 - 32 mmol/L    ANION GAP 6 mmol/L    BUN 19 5 - 25 mg/dL    Creatinine 1.83 (H) 0.60 - 1.30 mg/dL    Glucose 95 65 - 140 mg/dL    Calcium 9.9 8.4 - 10.2 mg/dL    eGFR 27 ml/min/1.73sq m   Blood gas, arterial    Collection Time: 11/23/23  5:42 AM   Result Value Ref Range    pH, Arterial 7.357 7.350 - 7.450    pCO2, Arterial 53.4 (H) 36.0 - 44.0 mm Hg    pO2, Arterial 36.9 (LL) 75.0 - 129.0 mm Hg    HCO3, Arterial 29.3 (H) 22.0 - 28.0 mmol/L    Base Excess, Arterial 3.2 mmol/L    O2 Content, Arterial 7.5 (L) 16.0 - 23.0 mL/dL    O2 HGB,Arterial  64.9 (L) 94.0 - 97.0 %    SOURCE Line, Venous     VENT- PS PS     PS PEEP 8     PS VENT FIO2 70     PS CM H2O 8    CBC and differential    Collection Time: 11/24/23  5:05 AM   Result Value Ref Range    WBC 7.69 4.31 - 10.16 Thousand/uL    RBC 2.47 (L) 3.81 - 5.12 Million/uL    Hemoglobin 7.4 (L) 11.5 - 15.4 g/dL    Hematocrit 25.1 (L) 34.8 - 46.1 %     (H) 82 - 98 fL    MCH 30.0 26.8 - 34.3 pg    MCHC 29.5 (L) 31.4 - 37.4 g/dL    RDW 16.4 (H) 11.6 - 15.1 %    MPV 9.3 8.9 - 12.7 fL    Platelets 480 229 - 678 Thousands/uL    nRBC 0 /100 WBCs    Neutrophils Relative 71 43 - 75 %    Immat GRANS % 1 0 - 2 %    Lymphocytes Relative 14 14 - 44 %    Monocytes Relative 11 4 - 12 %    Eosinophils Relative 2 0 - 6 %    Basophils Relative 1 0 - 1 %    Neutrophils Absolute 5.43 1.85 - 7.62 Thousands/µL    Immature Grans Absolute 0.11 0.00 - 0.20 Thousand/uL    Lymphocytes Absolute 1.11 0.60 - 4.47 Thousands/µL    Monocytes Absolute 0.81 0.17 - 1.22 Thousand/µL    Eosinophils Absolute 0.16 0.00 - 0.61 Thousand/µL    Basophils Absolute 0.07 0.00 - 0.10 Thousands/µL   Comprehensive metabolic panel    Collection Time: 11/24/23  5:05 AM   Result Value Ref Range    Sodium 144 135 - 147 mmol/L    Potassium 4.8 3.5 - 5.3 mmol/L    Chloride 107 96 - 108 mmol/L    CO2 32 21 - 32 mmol/L    ANION GAP 5 mmol/L    BUN 22 5 - 25 mg/dL    Creatinine 2.11 (H) 0.60 - 1.30 mg/dL    Glucose 99 65 - 140 mg/dL    Calcium 9.6 8.4 - 10.2 mg/dL    Corrected Calcium 10.6 (H) 8.3 - 10.1 mg/dL    AST 8 (L) 13 - 39 U/L    ALT 7 7 - 52 U/L    Alkaline Phosphatase 69 34 - 104 U/L    Total Protein 5.5 (L) 6.4 - 8.4 g/dL    Albumin 2.8 (L) 3.5 - 5.0 g/dL    Total Bilirubin 0.25 0.20 - 1.00 mg/dL    eGFR 23 ml/min/1.73sq m   LD,Blood    Collection Time: 11/24/23  5:05 AM   Result Value Ref Range     140 - 271 U/L       CT chest wo contrast    Result Date: 11/22/2023  Narrative: CT CHEST WITHOUT IV CONTRAST INDICATION:   Dyspnea, chronic, chest wall or pleura disease suspected shortness of breath. COMPARISON: Compared with 11/3/2023 TECHNIQUE: CT examination of the chest was performed without intravenous contrast. Multiplanar 2D reformatted images were created from the source data. This examination, like all CT scans performed in the [facility], was performed utilizing techniques to minimize radiation dose exposure, including the use of iterative reconstruction and automated exposure control. Radiation dose length product (DLP) for this visit:  318 mGy-cm FINDINGS: LUNGS: Moderate to severe emphysematous changes. Patchy parenchymal opacity in the lingula and left lower lobe posteriorly. Decreased left lung volume. There is no tracheal or endobronchial lesion. Tracheotomy tube seen. PLEURA:  Unremarkable. HEART/GREAT VESSELS: Heart is unremarkable for patient's age. No thoracic aortic aneurysm. Dilated main pulmonary artery measuring 4.3 cm. MEDIASTINUM AND PRASANTH: Subcentimeter right paratracheal and precarinal lymph nodes. CHEST WALL AND LOWER NECK:  Unremarkable. VISUALIZED STRUCTURES IN THE UPPER ABDOMEN: Gastrostomy tube in place with contrast opacified balloon in the lumen. Simple right renal cyst. OSSEOUS STRUCTURES: Lytic area in the T12 vertebral body along the inferior endplate is unchanged. Lucent focus in the left glenoid unchanged. Impression: Breathing motion artifacts and emphysematous changes limit evaluation. Infiltrates in the lingula and left lower lobe suggested. Improved from prior study.  Workstation performed: [id number] XR chest portable ICU    Result Date: 11/22/2023  Narrative: CHEST INDICATION:   hypoxia. COMPARISON: Chest x-ray November 20, 2023 EXAM PERFORMED/VIEWS:  XR CHEST PORTABLE ICU FINDINGS: Tracheostomy tube is in satisfactory position. Right central venous catheter terminates in the superior vena cava. Cardiomegaly is demonstrated. Stable interstitial opacities are seen which could reflect edema or interstitial infiltrate with patchy opacities in the lower lobes, worse on the left. Bony structures are unchanged     Impression: Stable exam. Workstation performed: [id number]     XR chest portable ICU    Result Date: 11/21/2023  Narrative: CHEST INDICATION:  Status post trach and vent at night, SOB. COMPARISON: 11/10/2023, CT chest 11/3/2023 EXAM PERFORMED/VIEWS:  XR CHEST PORTABLE ICU FINDINGS: -Tracheostomy tube projects in satisfactory radiographic position. -Right IJ central line tip terminates in the region of the SVC. Cardiomediastinal silhouette appears stable. Diffuse interstitial and groundglass opacities throughout the lungs, unchanged. No pneumothorax. Osseous structures appear within normal limits for patient age. Impression: Diffuse interstitial and groundglass opacities throughout the lungs, unchanged. Findings could be infectious/inflammatory and/or secondary to edema. Workstation performed: [id number]     Echo follow up/limited w/ contrast if indicated    Result Date: 11/21/2023  Narrative:   Left Ventricle: Left ventricular cavity size is normal. Wall thickness is severely increased. The left ventricular ejection fraction is 65%. Systolic function is normal. Wall motion is normal. Diastolic function is abnormal.  Left atrial filling pressure is elevated. Right Ventricle: Right ventricular cavity size is normal. Systolic function is normal.   Atrial Septum: No patent foramen ovale detected, confirmed at rest and with provcation using agitated saline contrast.   Mitral Valve:  There is moderate regurgitation. There is systolic anterior motion of the anterior leaflet without late peaking gradient at rest.   Pulmonic Valve: There is mild regurgitation. CT soft tissue neck w contrast    Result Date: 11/15/2023  Narrative: CT NECK WITH CONTRAST INDICATION:   Head/neck cancer, assess treatment response Assess response to chemo in terms of size of malignant neck mass. History of B-cell lymphoma. COMPARISON: 10/13/2023 TECHNIQUE:  Axial, sagittal, and coronal 2D reformatted images were created from the axial source data and submitted for interpretation. Radiation dose length product (DLP) for this visit:  524 mGy-cm . This examination, like all CT scans performed in the [facility], was performed utilizing techniques to minimize radiation dose exposure, including the use of iterative reconstruction and automated exposure control. IV Contrast:  85 mL of iohexol (OMNIPAQUE) IMAGE QUALITY:  Diagnostic. FINDINGS: VISUALIZED BRAIN PARENCHYMA:  No acute intracranial pathology of the visualized brain parenchyma. VISUALIZED ORBITS: Normal visualized orbits. PARANASAL SINUSES: There are retention cyst versus polyps within the sphenoid sinuses. Small amount of fluid in the left mastoid tip. NASAL CAVITY AND NASOPHARYNX: There has been interval decrease in size of the mass centered within the left nasopharynx. There is persistent but decreased effacement of the left fossa of Rosenmuller as well as eustachian tube orifice. Disease extends inferiorly along the left lateral oropharyngeal wall as well as along the left parapharyngeal space. For reference the ill-defined mass measures approximately 2.5 x 3.0 cm on series 2, image 22 and previously measured approximately 3.6 x 3.5 cm when measured at the same level on the prior exam. There is improved extension of disease into the left infratemporal fossa and  space as well as prevertebral space.  Persistent fat planes along the expected course of V3 as it exits foramen ovale. No  definite asymmetric widening of foramen ovale or regional erosive change. Soft tissue extends posterolaterally with persistent encasement of the left internal carotid artery however overall improved. SUPRAHYOID NECK: Please see above. INFRAHYOID NECK:  Aryepiglottic folds and piriform sinuses are normal.  Normal glottis and subglottic airway. THYROID GLAND:  Unremarkable. PAROTID AND SUBMANDIBULAR GLANDS:  Normal. LYMPH NODES: Please see above. No new adenopathy is identified. VASCULAR STRUCTURES: Partially imaged right-sided Mediport. There is thrombus noted within the right internal jugular vein just above the Mediport entrance site into the internal jugular vein. THORACIC INLET: Status post tracheostomy. Emphysema. Improved previously seen consolidation and layering effusions. BONY STRUCTURES: No acute fracture or destructive osseous lesion. Impression: Improved ill-defined mass centered along the left nasopharynx and oropharynx with regional extension of disease as described above. Findings consistent with the clinical history of lymphoma. No new suspicious adenopathy identified. Status post right-sided Mediport placement. Nonocclusive thrombus is seen within the right internal jugular vein just above the entrance site of the Mediport catheter into the internal jugular vein. Improved previously seen consolidation and effusion in the visualized lung fields. The study was marked in [facility] for immediate notification. Workstation performed: [id number]     FL barium swallow video w speech    Result Date: 11/14/2023  Narrative: A video barium swallow study was performed by the Department of Speech Pathology. Please refer to the report for the official interpretation. The images are stored for archival purposes only. Study images were not formally reviewed by the Radiology Department.     XR chest portable ICU    Result Date: 11/11/2023  Narrative: CHEST INDICATION: Desatruation and hypoxia, evaluation for intratrhoacic pathology. COMPARISON: 11/2/2023 EXAM PERFORMED/VIEWS:  XR CHEST PORTABLE ICU FINDINGS: Tracheostomy tube tip terminates approximately 1 cm from sujata. Right  sided central venous catheter tip near cavoatrial junction. Persistent dense  left  lung base opacity may represent a combination of effusion and parenchymal opacity. Improved diffuse bilateral hazy/groundglass opacity. No pneumothorax. Stable cardiomediastinal silhouette. No large right effusion. Impression: Persistent dense  left  lung base opacity may represent a combination of effusion and parenchymal opacity. Improved diffuse bilateral hazy/groundglass opacity. Workstation performed: [id number]     Bronchoscopy    Result Date: 11/6/2023  Narrative: Table formatting from the original result was not included. [address] [place] [address] [address] [phone] DATE OF SERVICE: 11/06/23 PHYSICIAN(S): Attending: Akosua Brown MD Fellow: Maya Martell MD INDICATION: Acute respiratory failure with hypoxia ([address]) POST-OP DIAGNOSIS: See the impression below. PREPROCEDURE: Standard airway preparation completed per respiratory therapy protocol. Informed consent was obtained. Images reviewed prior to the procedure. A Time Out was performed. No suspicion or identified risk for TB or other airborne infectious disease; bronchoscopy procedure being performed for diagnostic purposes. PROCEDURE: Bronchoscopy DETAILS OF PROCEDURE: Procedure was performed at bedside in the intensive care unit. A time out was performed to confirm correct patient and correct procedure. The patient underwent moderate sedation, which was administered by an intensivist. The patient's blood pressure, ECG, heart rate, oxygen and respirations were monitored throughout the procedure. The patient experienced no blood loss. The scope was introduced through the tracheostomy tube.  The procedure was not difficult. The patient tolerated the procedure well. There were no apparent adverse events. ANESTHESIA INFORMATION: ASA: ASA status not filed in the log. Anesthesia Type: Anesthesia type not filed in the log.  FINDINGS: The upper trachea, middle trachea, lower trachea, main sujata, left main stem, left apical-posterior segment (LB1+2), left upper anterior segment (LB3), superior lingular segment (LB4), inferior lingular segment (LB5), left superior segment (LB6), left anterior basal segment (LB7+8), left lateral basal segment (LB9), left posterior basal segment (LB10), right main stem, right apical segment (RB1), right posterior segment (RB2), right anterior segment (RB3), bronchus intermedius, right lateral segment (RB4), right medial segment (RB5), right anterior basal segment (RB8), right lateral basal segment (RB9) and right posterior basal segment (RB10) appeared normal. Bronchoalveolar lavage was performed x3 in the right medial segment (RB5) with 180 mL of saline instilled and a total return of 60 mL; the fluid appeared cloudy Minimal, thin and clear secretions present in the left apical-posterior segment (LB1+2), left upper anterior segment (LB3), superior lingular segment (LB4), inferior lingular segment (LB5), left superior segment (LB6), left anterior basal segment (LB7+8), left lateral basal segment (LB9), left posterior basal segment (LB10), right lateral segment (RB4), right medial segment (RB5), right superior segment (RB6), right medial basal segment (RB7), right anterior basal segment (RB8), right lateral basal segment (RB9) and right posterior basal segment (RB10); secretions were easily removed by therapeutic aspiration and the airway was cleared; performed washing, sample sent for cytology and microbiology analysis Performed 1 brushing with cytology and microbiology brushes in the right superior segment (RB6) SPECIMENS: ID Type Source Tests Collected by Time Destination 1 :  Washing Lung, Right Washing PULMONARY CYTOLOGY Edgardo Rodriguez MD 11/6/2023 1423  2 :  Lavage Lung, Right Middle Lobe Bronchoalveolar Lavage PULMONARY CYTOLOGY Edgardo Rodriguez MD 11/6/2023 1415  A :  Bronchial Lung, Right Middle Lobe Bronchoalveolar Lavage FUNGAL CULTURE, VIRUS CULTURE, BRONCHIAL CULTURE AND GRAM STAIN, LEGIONELLA CULTURE, PNEUMOCYSTIS SMEAR BY DFA ([facility]), AFB CULTURE WITH STAIN Edgardo Rodriguez MD 11/6/2023 1411  B :  Bronchial Lung, Right Lower Lobe Bronchial Washing FUNGAL CULTURE, VIRUS CULTURE, BRONCHIAL CULTURE AND GRAM STAIN, LEGIONELLA CULTURE, PNEUMOCYSTIS SMEAR BY DFA ([facility]), AFB CULTURE WITH STAIN Edgardo Rodriguez MD 11/6/2023 1421       Impression: Thick secretions in the inner canula of the tracheostomy. This was replaced for a new inner canula. Minimal amount of clear, thin secretions in bilateral airways. No evidence of mucous plug. No evidence of endobronchial lesions or obstructions. BAL of RML performed as well as brushing of RB-6. RECOMMENDATION: Follow-up all cultures and cytology from wash and from BAL Follow up culture results from [facility] Continue management in the intensive care unit. Can resume tube feeds and PO diet once awake. Lacie Dorado MD Pulmonary and Critical Care Fellow, PGY-5 [facility] I personally supervised, assisted in, and/or performed the entirety of this procedure. Elias Childs MD     CT chest wo contrast    Result Date: 11/4/2023  Narrative: CT CHEST WITHOUT IV CONTRAST INDICATION:   hypoxia. Acute hypoxic respiratory failure. Recently diagnosed large B-cell lymphoma. COMPARISON: 10/12/2023 TECHNIQUE: CT examination of the chest was performed without intravenous contrast. Multiplanar 2D reformatted images were created from the source data.  This examination, like all CT scans performed in the [facility], was performed utilizing techniques to minimize radiation dose exposure, including the use of iterative reconstruction and automated exposure control. Radiation dose length product (DLP) for this visit:  421 mGy-cm FINDINGS: LUNGS: Pulmonary emphysema noted, with superimposed diffuse alveolar and interstitial airspace opacities. Compared to the most recent examination, little change has occurred in the upper lung fields. There is partially diminished atelectasis at the lung bases, possibly related to the diminished size of the effusions. When the lung disease is compared to earlier studies such as 9/13/2023, the process remains significantly worsened PLEURA: Trace right effusion, but overall diminished size of the pleural effusions. No pneumothorax. HEART/GREAT VESSELS: No pericardial effusion. Cardiomegaly. Significant enlargement of the main pulmonary artery, diameter 4.7 cm earlier 3.9 cm. Central line tip is seen in the upper right atrium. MEDIASTINUM AND PRASANTH: No progressive adenopathy. Tracheotomy tube positioning appropriate. CHEST WALL AND LOWER NECK:  Unremarkable. VISUALIZED STRUCTURES IN THE UPPER ABDOMEN: Partial visualization of a incompletely assessed hypodense right kidney upper pole nodule OSSEOUS STRUCTURES: Chronic but enlarged (since 2020) lucent defect in the T12 vertebral body with pathologic fracture. Comparing to 8/25/2023, no change     Impression: 1. Persistent severe bilateral alveolar and interstitial opacities superimposed upon pulmonary emphysema. For the most part, this is unchanged from the most recent prior study although there is somewhat less atelectasis at the lung bases perhaps related to diminished size of previously seen pleural effusions. When the airspace disease is compared to earlier examinations for example 9/13/2023, significant worsening is again noted 2. No pneumothorax 3. Markedly enlarged main pulmonary artery consistent with pulmonary arterial hypertension. Workstation performed: [id number]     XR chest portable ICU    Result Date: 11/3/2023  Narrative: CHEST INDICATION:   hypoxia. COMPARISON: 10/29/2023 EXAM PERFORMED/VIEWS:  XR CHEST PORTABLE ICU Images: 2 FINDINGS: Cardiomediastinal silhouette appears enlarged. Tracheostomy tube is midline. Right-sided central catheter unchanged. Diffuse abnormal interstitial and alveolar disease appears slightly worse. No pneumothorax. No pleural effusion. Osseous structures appear within normal limits for patient age. Impression: Diffuse abnormal interstitial and alveolar disease appears worse. Correlate for multifocal pneumonia, heart failure, ARDS. Workstation performed: [id number]     XR chest portable ICU    Result Date: 10/30/2023  Narrative: CHEST INDICATION:   persistent hypoxemia, s/p trach for oropharyngeal b-cell lymphoma. COMPARISON: 10/19/2023 EXAM PERFORMED/VIEWS:  XR CHEST PORTABLE ICU FINDINGS: Right  sided central venous catheter tip near superior cavoatrial junction. Tracheostomy tube noted. Dense left  lung base opacity may represent a combination of effusion and parenchymal opacity. There is diffuse interstitial and hazy airspace/groundglass opacity. No pneumothorax. No large right effusion. Stable cardiomediastinal silhouette. Impression: No significant change. Workstation performed: [id number]     XR chest portable    Result Date: 10/30/2023  Narrative: CHEST INDICATION:   hypoxia. COMPARISON: 10/27/2023 EXAM PERFORMED/VIEWS:  XR CHEST PORTABLE FINDINGS: Right  sided central venous catheter tip near superior cavoatrial junction. Tracheostomy tube noted. Persistent dense  left  lung base opacity may represent a combination of effusion and parenchymal opacity. Persistent bilateral diffuse interstitial and hazy airspace/groundglass opacity. No pneumothorax. Stable cardiomediastinal silhouette. Impression: Persistent dense  left  lung base opacity may represent a combination of effusion and parenchymal opacity. Persistent bilateral diffuse interstitial and hazy airspace/groundglass opacity.  Workstation performed: [id number] I have personally reviewed labs, imaging studies, and pertinent reports. This note has been generated by voice recognition software system. Therefore, there may be spelling, grammar, and or syntax errors. Please contact if questions arise.

## 2023-11-24 NOTE — PHYSICAL THERAPY NOTE
Physical Therapy Cancellation Note           11/24/23 1020   Note Type   Note type Cancelled Session   Cancel Reasons Medical status   Additional Comments Pt with active PT orders. Pt currently requiring increased O2 needs per ICU mobility rounds. Will hold PT treatment and f/u as appropriate.      Melody Leal, PT

## 2023-11-24 NOTE — ASSESSMENT & PLAN NOTE
CT soft tissues of the neck: Nonocclusive thrombus noted within the right internal jugular vein just above the Mediport entrance site into the internal jugular vein. No signs of pain, headaches, vision changes.     Plan:  Continue w/ home Eliquis 5 mg

## 2023-11-24 NOTE — ASSESSMENT & PLAN NOTE
Cuffless trach placed on 9/17. Transitioned to cuffed trach on 10/3. Respiratory breathing has been challenging in the setting of anxiety given recent events. Repeat imaging does not show improvement in bilateral consolidation or atelectasis and groundglass opacities on repeat. S/p bronch. Bronchial cx with 3 colonies CoNS. Completed 7 day course of IV abx w/ cefepime and Vancomycin. Bronchial culture and gram stain negative. Micro negative for CMV, AFB, Fungitell, Pneumocystis jiroveci (carinii), Histoplasma, Aspergillus. Trach secretions sent for sputum culture shows 2+ polys, 1+ gram-positive cocci in pairs, chains and clusters and 1+ gram-negative rods. Cuffless trach was placed 9/17. Replaced with a cuffed Shiley XLT #7 10/3. Has been tolerating nightly BiPAP via trach w/ settings EPAP 8, PSV 4. Etiology: Consider drug induced pneumonitis in the setting of chemo 2/2 newly dx lymphoma. CT chest showed Infiltrates in the lingula and left lower lobe. Improved from prior study   Working with case management to qualify patient for home vent. Plan:  Continue w/ BIPAP via trach at night. EPAP 8, PSV 4. Continue to titrate O2 to maintain SpO2 >85%  Aggressive pulmonary toilet   Incentive Spirometry   Suctioning via trach if patient is desatting and unable to expectorate phlegm. Continue w/ guaifenesin, Atrovent and Xopenex for airway maintenance.     Continue inhalation treatment with Pulmicort and formoterol q12 , duonebs QID, atrovent and xopenex   Regular diet  Daily PT/OT

## 2023-11-24 NOTE — RESPIRATORY THERAPY NOTE
Patient was sating at 61% when I went into her room on 100% trach collar. Placed non rebreather over trach and had her cough. She coughed up a moderate amount of white sputum. I also suctioned to get the rest out. Spo2 94% after suctioning.  Placed patient back on trach collar 100%

## 2023-11-24 NOTE — ASSESSMENT & PLAN NOTE
Malnutrition Findings:   Adult Malnutrition type: Chronic illness  Adult Degree of Malnutrition: Unspecified protein calorie malnutrition  Malnutrition Characteristics: Inadequate energy, Other (comment) (stress of illness)                  360 Statement: Protein calorie malnutrition r/t inadequate intake as evidenced by >7 day period of poor intakes and stress of infection; currently treated with regular diet and nutrition supplements    BMI Findings: Body mass index is 31.26 kg/m².

## 2023-11-24 NOTE — ASSESSMENT & PLAN NOTE
Large B-cell lymphoma, stage II. First chemo cycle 9/22 4 days of R-EPOCH. 9/27 Rituxan. 9/28 Filgrastim. Received nivestym 300 mc 7 days dcd on 10/26   Discontinue Prophylaxis as per Heme Oncology recommendations     9/22  4 days of R-EPOCH.  9/27 Rituxan  10/13 for [facility] cycle 2, which was done over 4 days and cytoxan on 10/19.  11/6 completed R-EPOCH cycle 3 and Cytoxan 11/7. Plan:  CT neck w wo contrast 11/15 (showed IJ thrombus as above)  Pt will receive her chemotherapy today (11/24)   Transfuse blood products as needed.   Keep Hgb>7 and Plt>20 for chemo   See acute respiratory failure above

## 2023-11-24 NOTE — RESPIRATORY THERAPY NOTE
Placed patient on NIV as per AP.  Patient desating and unable to keeps sats greater than 75% on 100% TC

## 2023-11-24 NOTE — RESPIRATORY THERAPY NOTE
Patient desating to 71% on 100% TC. Patient refused to let me suction her even though I could hear the mucus in the airway. Worked on coaching her to spontaneously cough. Was able to cough up a small amount.  Sp02 92%

## 2023-11-24 NOTE — ASSESSMENT & PLAN NOTE
9/14 Neck/ soft tissue CT: Large enhancing soft tissue mass identified within the left neck involving multiple spaces. Centered within the left oropharynx with extensions as described above into multiple spaces. This results in significant airway compromise. 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 Small level 3/level 4 nodes are seen within the neck. Tracheostomy by ENT. Replaced on 9/26. H/o tobacco abuse, daily smoker. On nicotine while inpatient, confirmed with patient she needs nicotine patch. CT soft tissue neck shows reduced mass effect from 9/14 to 10/13. Can consider reducing trach size if continues to improve. Patient often refusing peg tube feeds but is feeding herself small meals orally. Plan:  ENT and heme onc following  Pt will be receiving her 4th cycle of chemotherapy today (11/24)  As per speech therapist diet  Dysphagia 2. Continue parallel PEG tube feeds for nutrition. See acute respiratory failure and large B cell lymphoma above.

## 2023-11-24 NOTE — ASSESSMENT & PLAN NOTE
Wt Readings from Last 3 Encounters:   11/24/23 72.6 kg (160 lb 0.9 oz)   09/07/23 74.6 kg (164 lb 6.4 oz)   08/30/23 73.3 kg (161 lb 9.6 oz)     Lab Results   Component Value Date    LVEF 65 11/21/2023     (H) 10/28/2023     (H) 09/29/2023     Echo 11/21/23: LVEF 65%.        Plan:  K > 4   Measure I/O

## 2023-11-25 LAB
ABO GROUP BLD BPU: NORMAL
ALBUMIN SERPL BCP-MCNC: 3.2 G/DL (ref 3.5–5)
ALBUMIN SERPL BCP-MCNC: 3.5 G/DL (ref 3.5–5)
ALP SERPL-CCNC: 71 U/L (ref 34–104)
ALP SERPL-CCNC: 78 U/L (ref 34–104)
ALT SERPL W P-5'-P-CCNC: 10 U/L (ref 7–52)
ALT SERPL W P-5'-P-CCNC: 8 U/L (ref 7–52)
ANION GAP SERPL CALCULATED.3IONS-SCNC: 11 MMOL/L
ANION GAP SERPL CALCULATED.3IONS-SCNC: 7 MMOL/L
ANISOCYTOSIS BLD QL SMEAR: PRESENT
AST SERPL W P-5'-P-CCNC: 10 U/L (ref 13–39)
AST SERPL W P-5'-P-CCNC: 17 U/L (ref 13–39)
BASOPHILS # BLD MANUAL: 0 THOUSAND/UL (ref 0–0.1)
BASOPHILS NFR MAR MANUAL: 0 % (ref 0–1)
BILIRUB SERPL-MCNC: 0.32 MG/DL (ref 0.2–1)
BILIRUB SERPL-MCNC: 0.32 MG/DL (ref 0.2–1)
BPU ID: NORMAL
BUN SERPL-MCNC: 27 MG/DL (ref 5–25)
BUN SERPL-MCNC: 30 MG/DL (ref 5–25)
CALCIUM ALBUM COR SERPL-MCNC: 10.4 MG/DL (ref 8.3–10.1)
CALCIUM SERPL-MCNC: 10 MG/DL (ref 8.4–10.2)
CALCIUM SERPL-MCNC: 9.8 MG/DL (ref 8.4–10.2)
CHLORIDE SERPL-SCNC: 104 MMOL/L (ref 96–108)
CHLORIDE SERPL-SCNC: 104 MMOL/L (ref 96–108)
CO2 SERPL-SCNC: 28 MMOL/L (ref 21–32)
CO2 SERPL-SCNC: 30 MMOL/L (ref 21–32)
CREAT SERPL-MCNC: 1.52 MG/DL (ref 0.6–1.3)
CREAT SERPL-MCNC: 1.58 MG/DL (ref 0.6–1.3)
CROSSMATCH: NORMAL
EOSINOPHIL # BLD MANUAL: 0 THOUSAND/UL (ref 0–0.4)
EOSINOPHIL NFR BLD MANUAL: 0 % (ref 0–6)
ERYTHROCYTE [DISTWIDTH] IN BLOOD BY AUTOMATED COUNT: 17.6 % (ref 11.6–15.1)
GFR SERPL CREATININE-BSD FRML MDRD: 32 ML/MIN/1.73SQ M
GFR SERPL CREATININE-BSD FRML MDRD: 34 ML/MIN/1.73SQ M
GLUCOSE SERPL-MCNC: 130 MG/DL (ref 65–140)
GLUCOSE SERPL-MCNC: 165 MG/DL (ref 65–140)
HCT VFR BLD AUTO: 29 % (ref 34.8–46.1)
HGB BLD-MCNC: 9.1 G/DL (ref 11.5–15.4)
LDH SERPL-CCNC: 184 U/L (ref 140–271)
LYMPHOCYTES # BLD AUTO: 0.33 THOUSAND/UL (ref 0.6–4.47)
LYMPHOCYTES # BLD AUTO: 4 % (ref 14–44)
MCH RBC QN AUTO: 29.5 PG (ref 26.8–34.3)
MCHC RBC AUTO-ENTMCNC: 31.4 G/DL (ref 31.4–37.4)
MCV RBC AUTO: 96 FL (ref 82–98)
METAMYELOCYTES NFR BLD MANUAL: 1 % (ref 0–1)
MONOCYTES # BLD AUTO: 0.08 THOUSAND/UL (ref 0–1.22)
MONOCYTES NFR BLD: 1 % (ref 4–12)
NEUTROPHILS # BLD MANUAL: 7.72 THOUSAND/UL (ref 1.85–7.62)
NEUTS BAND NFR BLD MANUAL: 5 % (ref 0–8)
NEUTS SEG NFR BLD AUTO: 89 % (ref 43–75)
PLATELET # BLD AUTO: 318 THOUSANDS/UL (ref 149–390)
PLATELET BLD QL SMEAR: ADEQUATE
PMV BLD AUTO: 9.3 FL (ref 8.9–12.7)
POTASSIUM SERPL-SCNC: 4.6 MMOL/L (ref 3.5–5.3)
POTASSIUM SERPL-SCNC: 5.5 MMOL/L (ref 3.5–5.3)
PROT SERPL-MCNC: 6.5 G/DL (ref 6.4–8.4)
PROT SERPL-MCNC: 7.3 G/DL (ref 6.4–8.4)
RBC # BLD AUTO: 3.08 MILLION/UL (ref 3.81–5.12)
RBC MORPH BLD: PRESENT
SODIUM SERPL-SCNC: 141 MMOL/L (ref 135–147)
SODIUM SERPL-SCNC: 143 MMOL/L (ref 135–147)
UNIT DISPENSE STATUS: NORMAL
UNIT PRODUCT CODE: NORMAL
UNIT PRODUCT VOLUME: 350 ML
UNIT RH: NORMAL
WBC # BLD AUTO: 8.21 THOUSAND/UL (ref 4.31–10.16)

## 2023-11-25 PROCEDURE — 85027 COMPLETE CBC AUTOMATED: CPT | Performed by: INTERNAL MEDICINE

## 2023-11-25 PROCEDURE — 94640 AIRWAY INHALATION TREATMENT: CPT

## 2023-11-25 PROCEDURE — 85007 BL SMEAR W/DIFF WBC COUNT: CPT | Performed by: INTERNAL MEDICINE

## 2023-11-25 PROCEDURE — 94003 VENT MGMT INPAT SUBQ DAY: CPT

## 2023-11-25 PROCEDURE — 83615 LACTATE (LD) (LDH) ENZYME: CPT | Performed by: INTERNAL MEDICINE

## 2023-11-25 PROCEDURE — 94760 N-INVAS EAR/PLS OXIMETRY 1: CPT

## 2023-11-25 PROCEDURE — 80053 COMPREHEN METABOLIC PANEL: CPT

## 2023-11-25 PROCEDURE — 99232 SBSQ HOSP IP/OBS MODERATE 35: CPT | Performed by: INTERNAL MEDICINE

## 2023-11-25 PROCEDURE — 94150 VITAL CAPACITY TEST: CPT

## 2023-11-25 RX ORDER — FUROSEMIDE 10 MG/ML
20 INJECTION INTRAMUSCULAR; INTRAVENOUS ONCE
Status: COMPLETED | OUTPATIENT
Start: 2023-11-25 | End: 2023-11-25

## 2023-11-25 RX ORDER — FUROSEMIDE 10 MG/ML
20 INJECTION INTRAMUSCULAR; INTRAVENOUS
Status: COMPLETED | OUTPATIENT
Start: 2023-11-25 | End: 2023-11-25

## 2023-11-25 RX ADMIN — PREDNISONE 100 MG: 50 TABLET ORAL at 09:26

## 2023-11-25 RX ADMIN — FAMOTIDINE 20 MG: 20 TABLET, FILM COATED ORAL at 17:30

## 2023-11-25 RX ADMIN — PREDNISONE 100 MG: 50 TABLET ORAL at 17:28

## 2023-11-25 RX ADMIN — ACYCLOVIR 400 MG: 200 CAPSULE ORAL at 09:27

## 2023-11-25 RX ADMIN — ETOPOSIDE: 20 INJECTION, SOLUTION INTRAVENOUS at 10:06

## 2023-11-25 RX ADMIN — FORMOTEROL FUMARATE DIHYDRATE 20 MCG: 20 SOLUTION RESPIRATORY (INHALATION) at 07:46

## 2023-11-25 RX ADMIN — APIXABAN 5 MG: 5 TABLET, FILM COATED ORAL at 09:26

## 2023-11-25 RX ADMIN — NICOTINE 7 MG: 7 PATCH, EXTENDED RELEASE TRANSDERMAL at 09:27

## 2023-11-25 RX ADMIN — BUSPIRONE HYDROCHLORIDE 30 MG: 10 TABLET ORAL at 21:04

## 2023-11-25 RX ADMIN — FORMOTEROL FUMARATE DIHYDRATE 20 MCG: 20 SOLUTION RESPIRATORY (INHALATION) at 20:17

## 2023-11-25 RX ADMIN — ALLOPURINOL 200 MG: 100 TABLET ORAL at 09:26

## 2023-11-25 RX ADMIN — FLUCONAZOLE 200 MG: 100 TABLET ORAL at 09:26

## 2023-11-25 RX ADMIN — CHLORHEXIDINE GLUCONATE 15 ML: 1.2 SOLUTION ORAL at 09:25

## 2023-11-25 RX ADMIN — LEVALBUTEROL HYDROCHLORIDE 1.25 MG: 1.25 SOLUTION RESPIRATORY (INHALATION) at 01:14

## 2023-11-25 RX ADMIN — APIXABAN 5 MG: 5 TABLET, FILM COATED ORAL at 21:04

## 2023-11-25 RX ADMIN — METOPROLOL TARTRATE 25 MG: 25 TABLET, FILM COATED ORAL at 09:25

## 2023-11-25 RX ADMIN — LEVALBUTEROL HYDROCHLORIDE 1.25 MG: 1.25 SOLUTION RESPIRATORY (INHALATION) at 14:26

## 2023-11-25 RX ADMIN — LEVALBUTEROL HYDROCHLORIDE 1.25 MG: 1.25 SOLUTION RESPIRATORY (INHALATION) at 20:17

## 2023-11-25 RX ADMIN — MELATONIN 3 MG: at 21:04

## 2023-11-25 RX ADMIN — OXYCODONE HYDROCHLORIDE 5 MG: 5 SOLUTION ORAL at 11:44

## 2023-11-25 RX ADMIN — LEVOFLOXACIN 250 MG: 250 TABLET, FILM COATED ORAL at 09:26

## 2023-11-25 RX ADMIN — IPRATROPIUM BROMIDE 0.5 MG: 0.5 SOLUTION RESPIRATORY (INHALATION) at 20:17

## 2023-11-25 RX ADMIN — FUROSEMIDE 20 MG: 10 INJECTION, SOLUTION INTRAMUSCULAR; INTRAVENOUS at 19:31

## 2023-11-25 RX ADMIN — SERTRALINE 75 MG: 25 TABLET, FILM COATED ORAL at 09:25

## 2023-11-25 RX ADMIN — BUDESONIDE 0.5 MG: 0.5 INHALANT ORAL at 07:46

## 2023-11-25 RX ADMIN — ACYCLOVIR 400 MG: 200 CAPSULE ORAL at 17:28

## 2023-11-25 RX ADMIN — GABAPENTIN 300 MG: 300 CAPSULE ORAL at 21:04

## 2023-11-25 RX ADMIN — ALLOPURINOL 200 MG: 100 TABLET ORAL at 17:29

## 2023-11-25 RX ADMIN — FAMOTIDINE 20 MG: 20 TABLET, FILM COATED ORAL at 09:26

## 2023-11-25 RX ADMIN — ALLOPURINOL 200 MG: 100 TABLET ORAL at 11:41

## 2023-11-25 RX ADMIN — IPRATROPIUM BROMIDE 0.5 MG: 0.5 SOLUTION RESPIRATORY (INHALATION) at 14:26

## 2023-11-25 RX ADMIN — METOPROLOL TARTRATE 25 MG: 25 TABLET, FILM COATED ORAL at 21:04

## 2023-11-25 RX ADMIN — FUROSEMIDE 20 MG: 10 INJECTION, SOLUTION INTRAMUSCULAR; INTRAVENOUS at 17:29

## 2023-11-25 RX ADMIN — OXYCODONE HYDROCHLORIDE 5 MG: 5 SOLUTION ORAL at 21:04

## 2023-11-25 RX ADMIN — BUSPIRONE HYDROCHLORIDE 30 MG: 10 TABLET ORAL at 17:28

## 2023-11-25 RX ADMIN — IPRATROPIUM BROMIDE 0.5 MG: 0.5 SOLUTION RESPIRATORY (INHALATION) at 01:14

## 2023-11-25 RX ADMIN — FOLIC ACID 1 MG: 1 TABLET ORAL at 09:26

## 2023-11-25 RX ADMIN — GABAPENTIN 300 MG: 300 CAPSULE ORAL at 09:26

## 2023-11-25 RX ADMIN — ONDANSETRON: 2 INJECTION INTRAMUSCULAR; INTRAVENOUS at 09:25

## 2023-11-25 RX ADMIN — LEVALBUTEROL HYDROCHLORIDE 1.25 MG: 1.25 SOLUTION RESPIRATORY (INHALATION) at 07:46

## 2023-11-25 RX ADMIN — BUDESONIDE 0.5 MG: 0.5 INHALANT ORAL at 20:17

## 2023-11-25 RX ADMIN — AMLODIPINE BESYLATE 10 MG: 5 TABLET ORAL at 09:26

## 2023-11-25 RX ADMIN — BUSPIRONE HYDROCHLORIDE 30 MG: 10 TABLET ORAL at 09:27

## 2023-11-25 RX ADMIN — OXYCODONE HYDROCHLORIDE 5 MG: 5 SOLUTION ORAL at 05:00

## 2023-11-25 RX ADMIN — IPRATROPIUM BROMIDE 0.5 MG: 0.5 SOLUTION RESPIRATORY (INHALATION) at 07:46

## 2023-11-25 RX ADMIN — ALLOPURINOL 200 MG: 100 TABLET ORAL at 21:04

## 2023-11-25 RX ADMIN — QUETIAPINE FUMARATE 50 MG: 25 TABLET ORAL at 21:04

## 2023-11-25 RX ADMIN — CHLORHEXIDINE GLUCONATE 15 ML: 1.2 SOLUTION ORAL at 21:04

## 2023-11-25 RX ADMIN — FUROSEMIDE 20 MG: 10 INJECTION, SOLUTION INTRAMUSCULAR; INTRAVENOUS at 11:43

## 2023-11-25 RX ADMIN — GABAPENTIN 300 MG: 300 CAPSULE ORAL at 17:29

## 2023-11-25 NOTE — PLAN OF CARE
Problem: MOBILITY - ADULT  Goal: Maintain or return to baseline ADL function  Description: INTERVENTIONS:  -  Assess patient's ability to carry out ADLs; assess patient's baseline for ADL function and identify physical deficits which impact ability to perform ADLs (bathing, care of mouth/teeth, toileting, grooming, dressing, etc.)  - Assess/evaluate cause of self-care deficits   - Assess range of motion  - Assess patient's mobility; develop plan if impaired  - Assess patient's need for assistive devices and provide as appropriate  - Encourage maximum independence but intervene and supervise when necessary  - Involve family in performance of ADLs  - Assess for home care needs following discharge   - Consider OT consult to assist with ADL evaluation and planning for discharge  - Provide patient education as appropriate  Outcome: Progressing  Goal: Maintains/Returns to pre admission functional level  Description: INTERVENTIONS:  - Perform BMAT or MOVE assessment daily.   - Set and communicate daily mobility goal to care team and patient/family/caregiver.    - Collaborate with rehabilitation services on mobility goals if consulted  - Out of bed for toileting  - Record patient progress and toleration of activity level   Outcome: Progressing     Problem: Prexisting or High Potential for Compromised Skin Integrity  Goal: Skin integrity is maintained or improved  Description: INTERVENTIONS:  - Identify patients at risk for skin breakdown  - Assess and monitor skin integrity  - Assess and monitor nutrition and hydration status  - Monitor labs   - Assess for incontinence   - Turn and reposition patient  - Assist with mobility/ambulation  - Relieve pressure over bony prominences  - Avoid friction and shearing  - Provide appropriate hygiene as needed including keeping skin clean and dry  - Evaluate need for skin moisturizer/barrier cream  - Collaborate with interdisciplinary team   - Patient/family teaching  - Consider wound care consult   Outcome: Progressing     Problem: Nutrition/Hydration-ADULT  Goal: Nutrient/Hydration intake appropriate for improving, restoring or maintaining nutritional needs  Description: Monitor and assess patient's nutrition/hydration status for malnutrition. Collaborate with interdisciplinary team and initiate plan and interventions as ordered. Monitor patient's weight and dietary intake as ordered or per policy. Utilize nutrition screening tool and intervene as necessary. Determine patient's food preferences and provide high-protein, high-caloric foods as appropriate.      INTERVENTIONS:  - Monitor oral intake, urinary output, labs, and treatment plans  - Assess nutrition and hydration status and recommend course of action  - Evaluate amount of meals eaten  - Assist patient with eating if necessary   - Allow adequate time for meals  - Recommend/ encourage appropriate diets, oral nutritional supplements, and vitamin/mineral supplements  - Order, calculate, and assess calorie counts as needed  - Recommend, monitor, and adjust tube feedings and TPN/PPN based on assessed needs  - Assess need for intravenous fluids  - Provide specific nutrition/hydration education as appropriate  - Include patient/family/caregiver in decisions related to nutrition  Outcome: Progressing     Problem: PAIN - ADULT  Goal: Verbalizes/displays adequate comfort level or baseline comfort level  Description: Interventions:  - Encourage patient to monitor pain and request assistance  - Assess pain using appropriate pain scale  - Administer analgesics based on type and severity of pain and evaluate response  - Implement non-pharmacological measures as appropriate and evaluate response  - Consider cultural and social influences on pain and pain management  - Notify physician/advanced practitioner if interventions unsuccessful or patient reports new pain  Outcome: Progressing     Problem: INFECTION - ADULT  Goal: Absence or prevention of progression during hospitalization  Description: INTERVENTIONS:  - Assess and monitor for signs and symptoms of infection  - Monitor lab/diagnostic results  - Monitor all insertion sites, i.e. indwelling lines, tubes, and drains  - Monitor endotracheal if appropriate and nasal secretions for changes in amount and color  - [place] appropriate cooling/warming therapies per order  - Administer medications as ordered  - Instruct and encourage patient and family to use good hand hygiene technique  - Identify and instruct in appropriate isolation precautions for identified infection/condition  Outcome: Progressing  Goal: Absence of fever/infection during neutropenic period  Description: INTERVENTIONS:  - Monitor WBC    Outcome: Progressing     Problem: SAFETY ADULT  Goal: Maintain or return to baseline ADL function  Description: INTERVENTIONS:  -  Assess patient's ability to carry out ADLs; assess patient's baseline for ADL function and identify physical deficits which impact ability to perform ADLs (bathing, care of mouth/teeth, toileting, grooming, dressing, etc.)  - Assess/evaluate cause of self-care deficits   - Assess range of motion  - Assess patient's mobility; develop plan if impaired  - Assess patient's need for assistive devices and provide as appropriate  - Encourage maximum independence but intervene and supervise when necessary  - Involve family in performance of ADLs  - Assess for home care needs following discharge   - Consider OT consult to assist with ADL evaluation and planning for discharge  - Provide patient education as appropriate  Outcome: Progressing  Goal: Maintains/Returns to pre admission functional level  Description: INTERVENTIONS:  - Perform BMAT or MOVE assessment daily.   - Set and communicate daily mobility goal to care team and patient/family/caregiver.    - Collaborate with rehabilitation services on mobility goals if consulted  - Out of bed for toileting  - Record patient progress and toleration of activity level   Outcome: Progressing  Goal: Patient will remain free of falls  Description: INTERVENTIONS:  - Educate patient/family on patient safety including physical limitations  - Instruct patient to call for assistance with activity   - Consult OT/PT to assist with strengthening/mobility   - Keep Call bell within reach  - Keep bed low and locked with side rails adjusted as appropriate  - Keep care items and personal belongings within reach  - Initiate and maintain comfort rounds  - Make Fall Risk Sign visible to staff  - Apply yellow socks and bracelet for high fall risk patients  - Consider moving patient to room near nurses station  Outcome: Progressing     Problem: DISCHARGE PLANNING  Goal: Discharge to home or other facility with appropriate resources  Description: INTERVENTIONS:  - Identify barriers to discharge w/patient and caregiver  - Arrange for needed discharge resources and transportation as appropriate  - Identify discharge learning needs (meds, wound care, etc.)  - Arrange for interpretive services to assist at discharge as needed  - Refer to Case Management Department for coordinating discharge planning if the patient needs post-hospital services based on physician/advanced practitioner order or complex needs related to functional status, cognitive ability, or social support system  Outcome: Progressing     Problem: Knowledge Deficit  Goal: Patient/family/caregiver demonstrates understanding of disease process, treatment plan, medications, and discharge instructions  Description: Complete learning assessment and assess knowledge base.   Interventions:  - Provide teaching at level of understanding  - Provide teaching via preferred learning methods  Outcome: Progressing     Problem: RESPIRATORY - ADULT  Goal: Achieves optimal ventilation and oxygenation  Description: INTERVENTIONS:  - Assess for changes in respiratory status  - Assess for changes in mentation and behavior  - Position to facilitate oxygenation and minimize respiratory effort  - Oxygen administered by appropriate delivery if ordered  - Initiate smoking cessation education as indicated  - Encourage broncho-pulmonary hygiene including cough, deep breathe, Incentive Spirometry  - Assess the need for suctioning and aspirate as needed  - Assess and instruct to report SOB or any respiratory difficulty  - Respiratory Therapy support as indicated  Outcome: Progressing     Problem: GENITOURINARY - ADULT  Goal: Maintains or returns to baseline urinary function  Description: INTERVENTIONS:  - Assess urinary function  - Encourage oral fluids to ensure adequate hydration if ordered  - Administer IV fluids as ordered to ensure adequate hydration  - Administer ordered medications as needed  - Offer frequent toileting  - Follow urinary retention protocol if ordered  Outcome: Progressing  Goal: Absence of urinary retention  Description: INTERVENTIONS:  - Assess patient’s ability to void and empty bladder  - Monitor I/O  - Bladder scan as needed  - Discuss with physician/AP medications to alleviate retention as needed  - Discuss catheterization for long term situations as appropriate  Outcome: Progressing     Problem: METABOLIC, FLUID AND ELECTROLYTES - ADULT  Goal: Electrolytes maintained within normal limits  Description: INTERVENTIONS:  - Monitor labs and assess patient for signs and symptoms of electrolyte imbalances  - Administer electrolyte replacement as ordered  - Monitor response to electrolyte replacements, including repeat lab results as appropriate  - Instruct patient on fluid and nutrition as appropriate  Outcome: Progressing  Goal: Fluid balance maintained  Description: INTERVENTIONS:  - Monitor labs   - Monitor I/O and WT  - Instruct patient on fluid and nutrition as appropriate  - Assess for signs & symptoms of volume excess or deficit  Outcome: Progressing  Goal: Glucose maintained within target range  Description: INTERVENTIONS:  - Monitor Blood Glucose as ordered  - Assess for signs and symptoms of hyperglycemia and hypoglycemia  - Administer ordered medications to maintain glucose within target range  - Assess nutritional intake and initiate nutrition service referral as needed  Outcome: Progressing

## 2023-11-25 NOTE — ASSESSMENT & PLAN NOTE
Lab Results   Component Value Date    EGFR 25 11/24/2023    EGFR 23 11/24/2023    EGFR 27 11/23/2023    CREATININE 1.93 (H) 11/24/2023    CREATININE 2.11 (H) 11/24/2023    CREATININE 1.83 (H) 11/23/2023     Baseline Cr between 0.75-1.1  Initial PO felt 2/2 prerenal azotemia 2/2 diuresis, reduced PO intake +/- ATN. Patient received 2 doses of Bumex IV 2 g as she had not been responding appropriately to IV 40 Lasix daily. Creatinine went up by 0.5 meeting criteria for an PO. Suspect most likely due to medications as a combination of prerenal and intrinsic. FENa was 0.2%, UA shows no casts or signs of infection, urine eosinophils 0%. Patient likely has prerenal PO from volume depletion. Received 2 L NS and 1 pRBC and cr went up by 0.07 still and Na and Cl went down, suspect that volume prevented further cr rise and free water excretion impaired by kidney function, allowing hyponatremia to increase. Suspect that now that nephrotoxic medications have been stopped she responded well to Lasix and creatinine downtrended. PO now resolved. Will be cautious about nephrotoxins and monitor as restarting EPOCH and ppx. Patient gets volume depleted and overloaded very easily. Especially from chemo. Plan:   Creatinine up trended to 1.31 today  Continue to monitor UO/renal function. Avoid nephrotoxic agents  Follow up with BMP in the morning   Continue to monitor a.m. BMP.

## 2023-11-25 NOTE — ASSESSMENT & PLAN NOTE
Large B-cell lymphoma, stage II. First chemo cycle 9/22 4 days of R-EPOCH. 9/27 Rituxan. 9/28 Filgrastim. Received nivestym 300 mc 7 days dcd on 10/26   Discontinue Prophylaxis as per Heme Oncology recommendations     9/22  4 days of R-EPOCH.  9/27 Rituxan  10/13 for [facility] cycle 2, which was done over 4 days and cytoxan on 10/19.  11/6 completed R-EPOCH cycle 3 and Cytoxan 11/7. Plan:  Continue with current chemo treatment 11/24-11/28. Transfuse blood products as needed.   Keep Hgb>7 and Plt>20 for chemo   See acute respiratory failure above

## 2023-11-25 NOTE — PROGRESS NOTES
1976 [facility]  Progress Note  Name: Christiano Odom  MRN: [id number]  Unit/Bed#: ICU 03 I Date of Admission: 9/13/2023   Date of Service: 11/25/2023 I Hospital Day: 68    Assessment/Plan   * Acute respiratory failure with hypoxia secondary to oropharyngeal mass  Assessment & Plan  Cuffless trach placed on 9/17. Transitioned to cuffed trach on 10/3. Respiratory breathing has been challenging in the setting of anxiety given recent events. Repeat imaging does not show improvement in bilateral consolidation or atelectasis and groundglass opacities on repeat. S/p bronch. Bronchial cx with 3 colonies CoNS. Completed 7 day course of IV abx w/ cefepime and Vancomycin. Bronchial culture and gram stain negative. Micro negative for CMV, AFB, Fungitell, Pneumocystis jiroveci (carinii), Histoplasma, Aspergillus. Trach secretions sent for sputum culture shows 2+ polys, 1+ gram-positive cocci in pairs, chains and clusters and 1+ gram-negative rods. Cuffless trach was placed 9/17. Replaced with a cuffed Shiley XLT #7 10/3. Has been tolerating nightly BiPAP via trach w/ settings EPAP 8, PSV 4. Etiology: Consider drug induced pneumonitis in the setting of chemo 2/2 newly dx lymphoma. CT chest showed Infiltrates in the lingula and left lower lobe. Improved from prior study   Working with case management to qualify patient for home vent. Plan:  Continue w/ BIPAP via trach at night. EPAP 8, PSV 4. Continue to titrate O2 to maintain SpO2 >85%  Aggressive pulmonary toilet   Incentive Spirometry   Suctioning via trach if patient is desatting and unable to expectorate phlegm. Continue w/ guaifenesin, Atrovent and Xopenex for airway maintenance. Continue inhalation treatment with Pulmicort and formoterol q12 , duonebs QID, atrovent and xopenex   Regular diet  Daily PT/OT    Large cell lymphoma (HCC)  Assessment & Plan  Large B-cell lymphoma, stage II. First chemo cycle 9/22 4 days of R-EPOCH. 9/27 Rituxan. 9/28 Filgrastim. Received nivestym 300 mc 7 days dcd on 10/26   Discontinue Prophylaxis as per Heme Oncology recommendations     9/22  4 days of R-EPOCH.  9/27 Rituxan  10/13 for [facility] cycle 2, which was done over 4 days and cytoxan on 10/19.  11/6 completed R-EPOCH cycle 3 and Cytoxan 11/7. Plan:  Continue with current chemo treatment 11/24-11/28. Transfuse blood products as needed. Keep Hgb>7 and Plt>20 for chemo   See acute respiratory failure above    Acute embolism and thrombosis of right internal jugular vein (HCC)  Assessment & Plan  CT soft tissues of the neck: Nonocclusive thrombus noted within the right internal jugular vein just above the Mediport entrance site into the internal jugular vein. No signs of pain, headaches, vision changes. Plan:  Continue w/ home Eliquis 5 mg     Blister (nonthermal) of left forearm, sequela  Assessment & Plan  Blisters of left upper extremity healing, no signs of infection, continue daily wound care. (HFpEF) heart failure with preserved ejection fraction [facility])  Assessment & Plan  Wt Readings from Last 3 Encounters:   11/24/23 72.6 kg (160 lb 0.9 oz)   09/07/23 74.6 kg (164 lb 6.4 oz)   08/30/23 73.3 kg (161 lb 9.6 oz)     Lab Results   Component Value Date    LVEF 65 11/21/2023     (H) 10/28/2023     (H) 09/29/2023     Echo 11/21/23: LVEF 65%. Plan:  K > 4   Measure I/O    Ambulatory dysfunction  Assessment & Plan  2/2 Impacted by chronic pain syndrome + prolonged hospital stay. Continue OOB with PT. PT rec post acute rehab however reportedly Cannot get LTACH placement while getting chemo per CM  She is aware STR SNFs continue to follow and treatment can be done from a SNF level of care. PT would need to be on trach collar for at least 72 hours with no need for the vent. Aware that pt would need to be around 28% FiO2     Chronic Superficial thrombophlebitis  Assessment & Plan  Right forearm soreness.   RUE US: No acute or chronic deep vein thrombosis. 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 PO not severe enough to require renal dosing at this time, will continue to monitor and adjust dosing as needed. Continue DVT ppx with Eliquis     CKD (chronic kidney disease) stage 3, GFR 30-59 ml/min [facility])  Assessment & Plan  Lab Results   Component Value Date    EGFR 25 11/24/2023    EGFR 23 11/24/2023    EGFR 27 11/23/2023    CREATININE 1.93 (H) 11/24/2023    CREATININE 2.11 (H) 11/24/2023    CREATININE 1.83 (H) 11/23/2023     Baseline Cr between 0.75-1.1  Initial PO felt 2/2 prerenal azotemia 2/2 diuresis, reduced PO intake +/- ATN. Patient received 2 doses of Bumex IV 2 g as she had not been responding appropriately to IV 40 Lasix daily. Creatinine went up by 0.5 meeting criteria for an PO. Suspect most likely due to medications as a combination of prerenal and intrinsic. FENa was 0.2%, UA shows no casts or signs of infection, urine eosinophils 0%. Patient likely has prerenal PO from volume depletion. Received 2 L NS and 1 pRBC and cr went up by 0.07 still and Na and Cl went down, suspect that volume prevented further cr rise and free water excretion impaired by kidney function, allowing hyponatremia to increase. Suspect that now that nephrotoxic medications have been stopped she responded well to Lasix and creatinine downtrended. PO now resolved. Will be cautious about nephrotoxins and monitor as restarting EPOCH and ppx. Patient gets volume depleted and overloaded very easily. Especially from chemo. Plan:   Creatinine up trended to 1.31 today  Continue to monitor UO/renal function. Avoid nephrotoxic agents  Follow up with BMP in the morning   Continue to monitor a.m. BMP. Benign essential hypertension  Assessment & Plan  Home regimen Coreg 12.5 mg BID, Amlodipine 10 mg daily, and lisinopril 40 mg.  All discontinued at this time secondary to hypotension and PO since cycle 1. but stable currently without any antihypertensives. Systolic persistently elevated and tachycardic, potentially secondary to pain. Patient was more hypotensive during last chemo. Coreg contraindicated during chemo, since cycles are every other week, would be better to stick with lopressor during duration of therapy as opposed to switching constantly. Plan:  Monitor vitals. Continue Norvasc 10 and lopressor 25 bid         Disposition: Stepdown Level 1    ICU Core Measures     Vented Patient  VAP Bundle  VAP bundle ordered     A: Assess, Prevent, and Manage Pain Has pain been assessed? Yes  Need for changes to pain regimen? No   B: Both Spontaneous Awakening Trials (SATs) and Spontaneous Breathing Trials (SBTs) Plan to perform spontaneous awakening trial today? N/A   Plan to perform spontaneous breathing trial today? N/A   Obvious barriers to extubation? NA   C: Choice of Sedation RASS Goal: 0 Alert and Calm  Need for changes to sedation or analgesia regimen? NA   D: Delirium CAM-ICU: Negative   E: Early Mobility  Plan for early mobility? Yes   F: Family Engagement Plan for family engagement today? Will call family today. Antibiotic Review: Patient on appropriate coverage based on culture data. Review of Invasive Devices:      Central access plan: Medications requiring central line      Prophylaxis:  VTE VTE covered by:  apixaban, Oral, 5 mg at 11/24/23 2059       Stress Ulcer  covered byfamotidine (PEPCID) tablet 20 mg [[id number]]         Significant 24hr Events     24hr events: No overnight events reported. Tolerated nightly BiPAP via trach. Subjective   Review of Systems   Constitutional:  Negative for chills, fatigue and fever. Respiratory:  Negative for cough, chest tightness and shortness of breath. Cardiovascular:  Negative for chest pain, palpitations and leg swelling. Gastrointestinal:  Negative for abdominal pain, diarrhea, nausea and vomiting.    Neurological:  Negative for dizziness, light-headedness and headaches. All other systems reviewed and are negative. Objective                            Vitals I/O      Most Recent Min/Max in 24hrs   Temp 98.7 °F (37.1 °C) Temp  Min: 97.7 °F (36.5 °C)  Max: 98.9 °F (37.2 °C)   Pulse 93 Pulse  Min: 83  Max: 106   Resp 16 Resp  Min: 7  Max: 28   /71 BP  Min: 111/65  Max: 138/71   O2 Sat 96 % SpO2  Min: 71 %  Max: 100 %      Intake/Output Summary (Last 24 hours) at 11/25/2023 4017  Last data filed at 11/25/2023 0600  Gross per 24 hour   Intake 1180 ml   Output 1075 ml   Net 105 ml       Diet Regular; Regular House    Invasive Monitoring           Physical Exam   Physical Exam  Eyes:      Conjunctiva/sclera: Conjunctivae normal.   HENT:      Head: Normocephalic and atraumatic. Right Ear: Hearing normal.      Left Ear: Hearing normal.      Mouth/Throat:      Mouth: Mucous membranes are moist.   Neck:      Trachea: Tracheostomy present. Comments: tunneled DL RIJ catheter w/o purulence  Cardiovascular:      Rate and Rhythm: Tachycardia present. Abdominal: General: Bowel sounds are normal.   Constitutional:       Appearance: She is ill-appearing. Pulmonary:      Effort: No respiratory distress. Breath sounds: Rhonchi present. Chest:      Chest wall: No tenderness. Neurological:      Mental Status: She is alert and oriented to person, place and time. Diagnostic Studies      EKG: No new EKG obtained today. Imaging:  I have personally reviewed pertinent reports.        Medications:  Scheduled PRN   acyclovir, 400 mg, BID  allopurinol, 200 mg, 4x Daily  amLODIPine, 10 mg, Daily  apixaban, 5 mg, BID  budesonide, 0.5 mg, Q12H  busPIRone, 30 mg, TID  chlorhexidine, 15 mL, Q12H [facility]  [START ON 11/28/2023] cyclophosphamide (CYTOXAN) 1,350 mg in sodium chloride 0.9 % 250 mL IVPB, 750 mg/m2 (Treatment Plan Recorded), Once  DOXOrubicin (ADRIAMYCIN) 18 mg, etoposide (TOPOSAR) 67.6 mg, vinCRIStine (ONCOVIN) 0.7 mg in sodium chloride 0.9 % 500 mL infusion, , Q24H  famotidine, 20 mg, BID  fluconazole, 390 mg, Daily  folic acid, 1 mg, Daily  formoterol, 20 mcg, Q12H  gabapentin, 300 mg, TID  levalbuterol, 1.25 mg, Q6H   And  ipratropium, 0.5 mg, Q6H  levofloxacin, 250 mg, Q24H ASHLEY  melatonin, 3 mg, HS  metoprolol tartrate, 25 mg, Q12H [address]  nicotine, 7 mg, Daily  ondansetron (ZOFRAN) 16 mg, dexamethasone (DECADRON) 10 mg in sodium chloride 0.9 % 50 mL IVPB, , Q24H  oxyCODONE, 5 mg, Q8H  polyethylene glycol, 17 g, Daily  predniSONE, 60 mg/m2 (Treatment Plan Recorded), BID With Meals  QUEtiapine, 50 mg, HS  senna, 17.6 mg, BID  sertraline, 75 mg, Daily  [START ON 11/28/2023] sodium chloride, 2,000 mL, Once  sulfamethoxazole-trimethoprim, 1 tablet, Once per day on Mon Wed Fri      alteplase, 2 mg, Q1MIN PRN  alteplase, 2 mg, Q1MIN PRN  alteplase, 2 mg, Q1MIN PRN  alteplase, 2 mg, Q1MIN PRN  ondansetron, 4 mg, Q8H PRN  ondansetron, 4 mg, Q8H PRN  oxyCODONE, 2.5 mg, Q6H PRN  sodium chloride, 20 mL/hr, Daily PRN       Continuous          Labs:    CBC    Recent Labs     11/24/23  0505 11/24/23  0731   WBC 7.69  --    HGB 7.4* 7.5*   HCT 25.1* 22*     --      BMP    Recent Labs     11/24/23  0505 11/24/23  0731 11/24/23  2050   SODIUM 144  --  144   K 4.8  --  5.3     --  105   CO2 32 30 31   AGAP 5  --  8   BUN 22  --  25   CREATININE 2.11*  --  1.93*   CALCIUM 9.6  --  9.4       Coags    No recent results     Additional Electrolytes  Recent Labs     11/24/23  0731   CAIONIZED 1.27          Blood Gas    No recent results  No recent results LFTs  Recent Labs     11/24/23  0505 11/24/23 2050   ALT 7 9   AST 8* 10*   ALKPHOS 69 77   ALB 2.8* 3.1*   TBILI 0.25 0.39       Infectious  No recent results  Glucose  Recent Labs     11/24/23  0505 11/24/23 2050   GLUC 99 160*               Nany Hamilton DO

## 2023-11-26 LAB
ALBUMIN SERPL BCP-MCNC: 3.1 G/DL (ref 3.5–5)
ALBUMIN SERPL BCP-MCNC: 3.2 G/DL (ref 3.5–5)
ALBUMIN SERPL BCP-MCNC: 3.4 G/DL (ref 3.5–5)
ALP SERPL-CCNC: 64 U/L (ref 34–104)
ALP SERPL-CCNC: 64 U/L (ref 34–104)
ALP SERPL-CCNC: 69 U/L (ref 34–104)
ALT SERPL W P-5'-P-CCNC: 13 U/L (ref 7–52)
ALT SERPL W P-5'-P-CCNC: 8 U/L (ref 7–52)
ALT SERPL W P-5'-P-CCNC: 9 U/L (ref 7–52)
ANION GAP SERPL CALCULATED.3IONS-SCNC: 8 MMOL/L
ANION GAP SERPL CALCULATED.3IONS-SCNC: 8 MMOL/L
ANION GAP SERPL CALCULATED.3IONS-SCNC: 9 MMOL/L
ANISOCYTOSIS BLD QL SMEAR: PRESENT
AST SERPL W P-5'-P-CCNC: 10 U/L (ref 13–39)
AST SERPL W P-5'-P-CCNC: 11 U/L (ref 13–39)
AST SERPL W P-5'-P-CCNC: 16 U/L (ref 13–39)
BASE EXCESS BLDA CALC-SCNC: 6 MMOL/L (ref -2–3)
BASOPHILS # BLD MANUAL: 0 THOUSAND/UL (ref 0–0.1)
BASOPHILS NFR MAR MANUAL: 0 % (ref 0–1)
BILIRUB SERPL-MCNC: 0.24 MG/DL (ref 0.2–1)
BILIRUB SERPL-MCNC: 0.24 MG/DL (ref 0.2–1)
BILIRUB SERPL-MCNC: 0.26 MG/DL (ref 0.2–1)
BUN SERPL-MCNC: 27 MG/DL (ref 5–25)
BUN SERPL-MCNC: 28 MG/DL (ref 5–25)
BUN SERPL-MCNC: 28 MG/DL (ref 5–25)
CA-I BLD-SCNC: 1.31 MMOL/L (ref 1.12–1.32)
CALCIUM ALBUM COR SERPL-MCNC: 10.2 MG/DL (ref 8.3–10.1)
CALCIUM ALBUM COR SERPL-MCNC: 10.3 MG/DL (ref 8.3–10.1)
CALCIUM ALBUM COR SERPL-MCNC: 10.4 MG/DL (ref 8.3–10.1)
CALCIUM SERPL-MCNC: 9.6 MG/DL (ref 8.4–10.2)
CALCIUM SERPL-MCNC: 9.7 MG/DL (ref 8.4–10.2)
CALCIUM SERPL-MCNC: 9.8 MG/DL (ref 8.4–10.2)
CHLORIDE SERPL-SCNC: 102 MMOL/L (ref 96–108)
CHLORIDE SERPL-SCNC: 102 MMOL/L (ref 96–108)
CHLORIDE SERPL-SCNC: 104 MMOL/L (ref 96–108)
CO2 SERPL-SCNC: 31 MMOL/L (ref 21–32)
CO2 SERPL-SCNC: 32 MMOL/L (ref 21–32)
CO2 SERPL-SCNC: 32 MMOL/L (ref 21–32)
CREAT SERPL-MCNC: 1.27 MG/DL (ref 0.6–1.3)
CREAT SERPL-MCNC: 1.33 MG/DL (ref 0.6–1.3)
CREAT SERPL-MCNC: 1.38 MG/DL (ref 0.6–1.3)
DS:DELIVERY SYSTEM: ABNORMAL
EOSINOPHIL # BLD MANUAL: 0 THOUSAND/UL (ref 0–0.4)
EOSINOPHIL NFR BLD MANUAL: 0 % (ref 0–6)
ERYTHROCYTE [DISTWIDTH] IN BLOOD BY AUTOMATED COUNT: 16.6 % (ref 11.6–15.1)
FIO2 GAS DIL.REBREATH: 40 L
GFR SERPL CREATININE-BSD FRML MDRD: 38 ML/MIN/1.73SQ M
GFR SERPL CREATININE-BSD FRML MDRD: 40 ML/MIN/1.73SQ M
GFR SERPL CREATININE-BSD FRML MDRD: 42 ML/MIN/1.73SQ M
GLUCOSE SERPL-MCNC: 136 MG/DL (ref 65–140)
GLUCOSE SERPL-MCNC: 138 MG/DL (ref 65–140)
GLUCOSE SERPL-MCNC: 145 MG/DL (ref 65–140)
GLUCOSE SERPL-MCNC: 201 MG/DL (ref 65–140)
HCO3 BLDA-SCNC: 31.9 MMOL/L (ref 22–28)
HCT VFR BLD AUTO: 29.8 % (ref 34.8–46.1)
HCT VFR BLD CALC: 31 % (ref 34.8–46.1)
HGB BLD-MCNC: 9.3 G/DL (ref 11.5–15.4)
HGB BLDA-MCNC: 10.5 G/DL (ref 11.5–15.4)
LDH SERPL-CCNC: 189 U/L (ref 140–271)
LYMPHOCYTES # BLD AUTO: 0.46 THOUSAND/UL (ref 0.6–4.47)
LYMPHOCYTES # BLD AUTO: 6 % (ref 14–44)
MCH RBC QN AUTO: 29.6 PG (ref 26.8–34.3)
MCHC RBC AUTO-ENTMCNC: 31.2 G/DL (ref 31.4–37.4)
MCV RBC AUTO: 95 FL (ref 82–98)
MONOCYTES # BLD AUTO: 0.31 THOUSAND/UL (ref 0–1.22)
MONOCYTES NFR BLD: 4 % (ref 4–12)
NEUTROPHILS # BLD MANUAL: 6.93 THOUSAND/UL (ref 1.85–7.62)
NEUTS BAND NFR BLD MANUAL: 2 % (ref 0–8)
NEUTS SEG NFR BLD AUTO: 88 % (ref 43–75)
PCO2 BLD: 33 MMOL/L (ref 21–32)
PCO2 BLD: 49 MM HG (ref 36–44)
PH BLD: 7.42 [PH] (ref 7.35–7.45)
PLATELET # BLD AUTO: 369 THOUSANDS/UL (ref 149–390)
PLATELET BLD QL SMEAR: ADEQUATE
PMV BLD AUTO: 9.1 FL (ref 8.9–12.7)
PO2 BLD: 192 MM HG (ref 75–129)
POTASSIUM BLD-SCNC: 4 MMOL/L (ref 3.5–5.3)
POTASSIUM SERPL-SCNC: 3.9 MMOL/L (ref 3.5–5.3)
POTASSIUM SERPL-SCNC: 4 MMOL/L (ref 3.5–5.3)
POTASSIUM SERPL-SCNC: 4.1 MMOL/L (ref 3.5–5.3)
POTASSIUM SERPL-SCNC: 4.3 MMOL/L (ref 3.5–5.3)
PROT SERPL-MCNC: 6.1 G/DL (ref 6.4–8.4)
PROT SERPL-MCNC: 6.3 G/DL (ref 6.4–8.4)
PROT SERPL-MCNC: 6.5 G/DL (ref 6.4–8.4)
RBC # BLD AUTO: 3.14 MILLION/UL (ref 3.81–5.12)
RBC MORPH BLD: PRESENT
SAO2 % BLD FROM PO2: 100 % (ref 60–85)
SODIUM BLD-SCNC: 140 MMOL/L (ref 136–145)
SODIUM SERPL-SCNC: 142 MMOL/L (ref 135–147)
SODIUM SERPL-SCNC: 143 MMOL/L (ref 135–147)
SODIUM SERPL-SCNC: 143 MMOL/L (ref 135–147)
SPECIMEN SOURCE: ABNORMAL
WBC # BLD AUTO: 7.7 THOUSAND/UL (ref 4.31–10.16)

## 2023-11-26 PROCEDURE — 94669 MECHANICAL CHEST WALL OSCILL: CPT

## 2023-11-26 PROCEDURE — 82947 ASSAY GLUCOSE BLOOD QUANT: CPT

## 2023-11-26 PROCEDURE — 83615 LACTATE (LD) (LDH) ENZYME: CPT | Performed by: INTERNAL MEDICINE

## 2023-11-26 PROCEDURE — 99232 SBSQ HOSP IP/OBS MODERATE 35: CPT | Performed by: INTERNAL MEDICINE

## 2023-11-26 PROCEDURE — 84132 ASSAY OF SERUM POTASSIUM: CPT

## 2023-11-26 PROCEDURE — 85007 BL SMEAR W/DIFF WBC COUNT: CPT | Performed by: INTERNAL MEDICINE

## 2023-11-26 PROCEDURE — 85014 HEMATOCRIT: CPT

## 2023-11-26 PROCEDURE — 80053 COMPREHEN METABOLIC PANEL: CPT | Performed by: INTERNAL MEDICINE

## 2023-11-26 PROCEDURE — 94760 N-INVAS EAR/PLS OXIMETRY 1: CPT

## 2023-11-26 PROCEDURE — 94003 VENT MGMT INPAT SUBQ DAY: CPT

## 2023-11-26 PROCEDURE — 36600 WITHDRAWAL OF ARTERIAL BLOOD: CPT

## 2023-11-26 PROCEDURE — 84295 ASSAY OF SERUM SODIUM: CPT

## 2023-11-26 PROCEDURE — 82955 ASSAY OF G6PD ENZYME: CPT

## 2023-11-26 PROCEDURE — 85027 COMPLETE CBC AUTOMATED: CPT | Performed by: INTERNAL MEDICINE

## 2023-11-26 PROCEDURE — 82330 ASSAY OF CALCIUM: CPT

## 2023-11-26 PROCEDURE — 94640 AIRWAY INHALATION TREATMENT: CPT

## 2023-11-26 PROCEDURE — 82803 BLOOD GASES ANY COMBINATION: CPT

## 2023-11-26 PROCEDURE — 80053 COMPREHEN METABOLIC PANEL: CPT

## 2023-11-26 PROCEDURE — 94150 VITAL CAPACITY TEST: CPT

## 2023-11-26 RX ORDER — POTASSIUM CHLORIDE 20MEQ/15ML
20 LIQUID (ML) ORAL ONCE
Status: COMPLETED | OUTPATIENT
Start: 2023-11-26 | End: 2023-11-26

## 2023-11-26 RX ADMIN — LEVALBUTEROL HYDROCHLORIDE 1.25 MG: 1.25 SOLUTION RESPIRATORY (INHALATION) at 20:33

## 2023-11-26 RX ADMIN — ONDANSETRON: 2 INJECTION INTRAMUSCULAR; INTRAVENOUS at 09:48

## 2023-11-26 RX ADMIN — NICOTINE 7 MG: 7 PATCH, EXTENDED RELEASE TRANSDERMAL at 10:04

## 2023-11-26 RX ADMIN — APIXABAN 5 MG: 5 TABLET, FILM COATED ORAL at 21:57

## 2023-11-26 RX ADMIN — POTASSIUM CHLORIDE 20 MEQ: 1.5 SOLUTION ORAL at 14:18

## 2023-11-26 RX ADMIN — FOLIC ACID 1 MG: 1 TABLET ORAL at 10:02

## 2023-11-26 RX ADMIN — PREDNISONE 100 MG: 50 TABLET ORAL at 10:01

## 2023-11-26 RX ADMIN — ALLOPURINOL 200 MG: 100 TABLET ORAL at 16:08

## 2023-11-26 RX ADMIN — OXYCODONE HYDROCHLORIDE 5 MG: 5 SOLUTION ORAL at 04:59

## 2023-11-26 RX ADMIN — METOPROLOL TARTRATE 25 MG: 25 TABLET, FILM COATED ORAL at 10:02

## 2023-11-26 RX ADMIN — OXYCODONE HYDROCHLORIDE 5 MG: 5 SOLUTION ORAL at 14:18

## 2023-11-26 RX ADMIN — GABAPENTIN 300 MG: 300 CAPSULE ORAL at 21:56

## 2023-11-26 RX ADMIN — FORMOTEROL FUMARATE DIHYDRATE 20 MCG: 20 SOLUTION RESPIRATORY (INHALATION) at 20:32

## 2023-11-26 RX ADMIN — ETOPOSIDE: 20 INJECTION, SOLUTION INTRAVENOUS at 09:50

## 2023-11-26 RX ADMIN — IPRATROPIUM BROMIDE 0.5 MG: 0.5 SOLUTION RESPIRATORY (INHALATION) at 02:12

## 2023-11-26 RX ADMIN — SERTRALINE 75 MG: 25 TABLET, FILM COATED ORAL at 10:02

## 2023-11-26 RX ADMIN — PREDNISONE 100 MG: 50 TABLET ORAL at 18:17

## 2023-11-26 RX ADMIN — APIXABAN 5 MG: 5 TABLET, FILM COATED ORAL at 10:02

## 2023-11-26 RX ADMIN — ALLOPURINOL 200 MG: 100 TABLET ORAL at 10:01

## 2023-11-26 RX ADMIN — METOPROLOL TARTRATE 25 MG: 25 TABLET, FILM COATED ORAL at 21:57

## 2023-11-26 RX ADMIN — AMLODIPINE BESYLATE 10 MG: 5 TABLET ORAL at 10:01

## 2023-11-26 RX ADMIN — LEVALBUTEROL HYDROCHLORIDE 1.25 MG: 1.25 SOLUTION RESPIRATORY (INHALATION) at 02:12

## 2023-11-26 RX ADMIN — IPRATROPIUM BROMIDE 0.5 MG: 0.5 SOLUTION RESPIRATORY (INHALATION) at 08:13

## 2023-11-26 RX ADMIN — BUSPIRONE HYDROCHLORIDE 30 MG: 10 TABLET ORAL at 21:57

## 2023-11-26 RX ADMIN — FLUCONAZOLE 200 MG: 100 TABLET ORAL at 10:01

## 2023-11-26 RX ADMIN — FORMOTEROL FUMARATE DIHYDRATE 20 MCG: 20 SOLUTION RESPIRATORY (INHALATION) at 08:13

## 2023-11-26 RX ADMIN — ALLOPURINOL 200 MG: 100 TABLET ORAL at 22:00

## 2023-11-26 RX ADMIN — ACYCLOVIR 400 MG: 200 CAPSULE ORAL at 10:03

## 2023-11-26 RX ADMIN — ALLOPURINOL 200 MG: 100 TABLET ORAL at 18:16

## 2023-11-26 RX ADMIN — SODIUM CHLORIDE 20 ML/HR: 0.9 INJECTION, SOLUTION INTRAVENOUS at 09:03

## 2023-11-26 RX ADMIN — IPRATROPIUM BROMIDE 0.5 MG: 0.5 SOLUTION RESPIRATORY (INHALATION) at 20:33

## 2023-11-26 RX ADMIN — FAMOTIDINE 20 MG: 20 TABLET, FILM COATED ORAL at 18:16

## 2023-11-26 RX ADMIN — FAMOTIDINE 20 MG: 20 TABLET, FILM COATED ORAL at 10:02

## 2023-11-26 RX ADMIN — LEVALBUTEROL HYDROCHLORIDE 1.25 MG: 1.25 SOLUTION RESPIRATORY (INHALATION) at 14:37

## 2023-11-26 RX ADMIN — OXYCODONE HYDROCHLORIDE 5 MG: 5 SOLUTION ORAL at 21:55

## 2023-11-26 RX ADMIN — GABAPENTIN 300 MG: 300 CAPSULE ORAL at 10:02

## 2023-11-26 RX ADMIN — BUDESONIDE 0.5 MG: 0.5 INHALANT ORAL at 08:13

## 2023-11-26 RX ADMIN — BUDESONIDE 0.5 MG: 0.5 INHALANT ORAL at 20:33

## 2023-11-26 RX ADMIN — QUETIAPINE FUMARATE 50 MG: 25 TABLET ORAL at 21:56

## 2023-11-26 RX ADMIN — LEVOFLOXACIN 250 MG: 250 TABLET, FILM COATED ORAL at 10:02

## 2023-11-26 RX ADMIN — BUSPIRONE HYDROCHLORIDE 30 MG: 10 TABLET ORAL at 10:03

## 2023-11-26 RX ADMIN — LEVALBUTEROL HYDROCHLORIDE 1.25 MG: 1.25 SOLUTION RESPIRATORY (INHALATION) at 08:13

## 2023-11-26 RX ADMIN — CHLORHEXIDINE GLUCONATE 15 ML: 1.2 SOLUTION ORAL at 21:57

## 2023-11-26 RX ADMIN — ACYCLOVIR 400 MG: 200 CAPSULE ORAL at 21:57

## 2023-11-26 RX ADMIN — CHLORHEXIDINE GLUCONATE 15 ML: 1.2 SOLUTION ORAL at 10:01

## 2023-11-26 RX ADMIN — IPRATROPIUM BROMIDE 0.5 MG: 0.5 SOLUTION RESPIRATORY (INHALATION) at 14:37

## 2023-11-26 RX ADMIN — MELATONIN 3 MG: at 21:57

## 2023-11-26 RX ADMIN — GABAPENTIN 300 MG: 300 CAPSULE ORAL at 16:08

## 2023-11-26 RX ADMIN — BUSPIRONE HYDROCHLORIDE 30 MG: 10 TABLET ORAL at 16:08

## 2023-11-26 NOTE — ASSESSMENT & PLAN NOTE
Malnutrition Findings:   Adult Malnutrition type: Chronic illness  Adult Degree of Malnutrition: Unspecified protein calorie malnutrition  Malnutrition Characteristics: Inadequate energy, Other (comment) (stress of illness)                  360 Statement: Protein calorie malnutrition r/t inadequate intake as evidenced by >7 day period of poor intakes and stress of infection; currently treated with regular diet and nutrition supplements    BMI Findings: Body mass index is 32.29 kg/m². electronic

## 2023-11-26 NOTE — ASSESSMENT & PLAN NOTE
Wt Readings from Last 3 Encounters:   11/26/23 75 kg (165 lb 5.5 oz)   09/07/23 74.6 kg (164 lb 6.4 oz)   08/30/23 73.3 kg (161 lb 9.6 oz)     Lab Results   Component Value Date    LVEF 65 11/21/2023     (H) 10/28/2023     (H) 09/29/2023     Echo 11/21/23: LVEF 65%.        Plan:  K > 4   Measure I/O

## 2023-11-26 NOTE — ASSESSMENT & PLAN NOTE
Lab Results   Component Value Date    EGFR 34 11/25/2023    EGFR 32 11/25/2023    EGFR 25 11/24/2023    CREATININE 1.52 (H) 11/25/2023    CREATININE 1.58 (H) 11/25/2023    CREATININE 1.93 (H) 11/24/2023     Baseline Cr between 0.75-1.1  Initial PO felt 2/2 prerenal azotemia 2/2 diuresis, reduced PO intake +/- ATN. Patient received 2 doses of Bumex IV 2 g as she had not been responding appropriately to IV 40 Lasix daily. Creatinine went up by 0.5 meeting criteria for an PO. Suspect most likely due to medications as a combination of prerenal and intrinsic. FENa was 0.2%, UA shows no casts or signs of infection, urine eosinophils 0%. Patient likely has prerenal PO from volume depletion. Received 2 L NS and 1 pRBC and cr went up by 0.07 still and Na and Cl went down, suspect that volume prevented further cr rise and free water excretion impaired by kidney function, allowing hyponatremia to increase. Suspect that now that nephrotoxic medications have been stopped she responded well to Lasix and creatinine downtrended. PO now resolved. Will be cautious about nephrotoxins and monitor as restarting EPOCH and ppx. Patient gets volume depleted and overloaded very easily. Especially from chemo. Plan:   Creatinine up trended to 1.31 today  Continue to monitor UO/renal function. Avoid nephrotoxic agents  Follow up with BMP in the morning   Continue to monitor a.m. BMP.

## 2023-11-26 NOTE — ASSESSMENT & PLAN NOTE
Large B-cell lymphoma, stage II. First chemo cycle 9/22 4 days of R-EPOCH. 9/27 Rituxan. 9/28 Filgrastim. Received nivestym 300 mc 7 days dcd on 10/26   Discontinue Prophylaxis as per Heme Oncology recommendations  Pt had hyperkalemia of 5.5, can be due to Bactrim, consider switching to atovaquone      9/22  4 days of R-EPOCH.  9/27 Rituxan  10/13 for [facility] cycle 2, which was done over 4 days and cytoxan on 10/19.  11/6 completed R-EPOCH cycle 3 and Cytoxan 11/7. Plan:  Continue with current chemo treatment 11/24-11/28. Continue Bactrim, Acyclovir, Allopurinol for prophylaxis   Transfuse blood products as needed.   Keep Hgb>7 and Plt>20 for chemo   See acute respiratory failure above

## 2023-11-26 NOTE — ASSESSMENT & PLAN NOTE
9/14 Neck/ soft tissue CT: Large enhancing soft tissue mass identified within the left neck involving multiple spaces. Centered within the left oropharynx with extensions as described above into multiple spaces. This results in significant airway compromise. 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 Small level 3/level 4 nodes are seen within the neck. Tracheostomy by ENT. Replaced on 9/26. H/o tobacco abuse, daily smoker. On nicotine while inpatient, confirmed with patient she needs nicotine patch. CT soft tissue neck shows reduced mass effect from 9/14 to 10/13. Can consider reducing trach size if continues to improve. Patient often refusing peg tube feeds but is feeding herself small meals orally. Plan:  ENT and heme onc following  Pt receiving her 4th cycle of chemotherapy - Day 3 (11/26)  As per speech therapist diet  Dysphagia 2. Continue parallel PEG tube feeds for nutrition. See acute respiratory failure and large B cell lymphoma above.

## 2023-11-26 NOTE — ASSESSMENT & PLAN NOTE
Recent Labs     11/24/23  0505 11/24/23  0731 11/25/23  0452   HGB 7.4* 7.5* 9.1*       '  Patient received 1 PRBC transfusion on 10/5 and 10/25. Her B/L hemoglobin is 12-14. No sites of bleeding, no black stools. Iron panel indicative of inflammatory anemia. Normal Vitamin B12 levels. Folate low at 5.5. Filgrastim 4 doses administered by heme/onc. Hb declined from 9.9 to 7.4, pt received 1 PRBC transfusion (11/24)    Plan:  Trend hemoglobin and transfuse for <7. As per heme/onc transfuse irradiated and leukoreduced blood products. Trend platelets  Folic acid 1mg daily supplementation  As per conversation with Heme/oncology attending, patient pancytopenia associated with chemotherapy is expected/predictable. No further anemia w/u required.   Low ANC management as per heme/onc

## 2023-11-26 NOTE — PROGRESS NOTES
[address] [facility]  Progress Note  Name: Ayde Sorensen  MRN: [id number]  Unit/Bed#: ICU 03 I Date of Admission: 9/13/2023   Date of Service: 11/26/2023 I Hospital Day: 76    Assessment/Plan   * Acute respiratory failure with hypoxia secondary to oropharyngeal mass  Assessment & Plan  Cuffless trach placed on 9/17. Transitioned to cuffed trach on 10/3. Respiratory breathing has been challenging in the setting of anxiety given recent events. Repeat imaging does not show improvement in bilateral consolidation or atelectasis and groundglass opacities on repeat. S/p bronch. Bronchial cx with 3 colonies CoNS. Completed 7 day course of IV abx w/ cefepime and Vancomycin. Bronchial culture and gram stain negative. Micro negative for CMV, AFB, Fungitell, Pneumocystis jiroveci (carinii), Histoplasma, Aspergillus. Trach secretions sent for sputum culture shows 2+ polys, 1+ gram-positive cocci in pairs, chains and clusters and 1+ gram-negative rods. Cuffless trach was placed 9/17. Replaced with a cuffed Shiley XLT #7 10/3. Has been tolerating nightly BiPAP via trach w/ settings EPAP 8, PSV 4. Etiology: Consider drug induced pneumonitis in the setting of chemo 2/2 newly dx lymphoma. CT chest showed Infiltrates in the lingula and left lower lobe. Improved from prior study   Working with case management to qualify patient for home vent. Plan:  Continue w/ BIPAP via trach at night. EPAP 8, PSV 4. Continue to titrate O2 to maintain SpO2 >85%  Aggressive pulmonary toilet   Incentive Spirometry   Suctioning via trach if patient is desatting and unable to expectorate phlegm. Continue w/ guaifenesin, Atrovent and Xopenex for airway maintenance. Continue inhalation treatment with Pulmicort and formoterol q12 , duonebs QID, atrovent and xopenex   Regular diet  Daily PT/OT    Large cell lymphoma (HCC)  Assessment & Plan  Large B-cell lymphoma, stage II. First chemo cycle 9/22 4 days of R-EPOCH. 9/27 Rituxan. 9/28 Filgrastim. Received nivestym 300 mc 7 days dcd on 10/26   Discontinue Prophylaxis as per Heme Oncology recommendations  Pt had hyperkalemia of 5.5, can be due to Bactrim, consider switching to atovaquone      9/22  4 days of R-EPOCH.  9/27 Rituxan  10/13 for [facility] cycle 2, which was done over 4 days and cytoxan on 10/19.  11/6 completed R-EPOCH cycle 3 and Cytoxan 11/7. Plan:  Continue with current chemo treatment 11/24-11/28. Continue Bactrim, Acyclovir, Allopurinol for prophylaxis   Transfuse blood products as needed. Keep Hgb>7 and Plt>20 for chemo   See acute respiratory failure above    Acute embolism and thrombosis of right internal jugular vein (HCC)  Assessment & Plan  CT soft tissues of the neck: Nonocclusive thrombus noted within the right internal jugular vein just above the Mediport entrance site into the internal jugular vein. No signs of pain, headaches, vision changes. Plan:  Continue w/ home Eliquis 5 mg     Oropharyngeal mass  Assessment & Plan  9/14 Neck/ soft tissue CT: Large enhancing soft tissue mass identified within the left neck involving multiple spaces. Centered within the left oropharynx with extensions as described above into multiple spaces. This results in significant airway compromise. 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 Small level 3/level 4 nodes are seen within the neck. Tracheostomy by ENT. Replaced on 9/26. H/o tobacco abuse, daily smoker. On nicotine while inpatient, confirmed with patient she needs nicotine patch. CT soft tissue neck shows reduced mass effect from 9/14 to 10/13. Can consider reducing trach size if continues to improve. Patient often refusing peg tube feeds but is feeding herself small meals orally. Plan:  ENT and heme onc following  Pt receiving her 4th cycle of chemotherapy - Day 3 (11/26)  As per speech therapist diet  Dysphagia 2. Continue parallel PEG tube feeds for nutrition.    See acute respiratory failure and large B cell lymphoma above. Pancytopenia due to chemotherapy [facility])  Assessment & Plan  Recent Labs     11/24/23  0505 11/24/23  0731 11/25/23  0452   HGB 7.4* 7.5* 9.1*       '  Patient received 1 PRBC transfusion on 10/5 and 10/25. Her B/L hemoglobin is 12-14. No sites of bleeding, no black stools. Iron panel indicative of inflammatory anemia. Normal Vitamin B12 levels. Folate low at 5.5. Filgrastim 4 doses administered by heme/onc. Hb declined from 9.9 to 7.4, pt received 1 PRBC transfusion (11/24)    Plan:  Trend hemoglobin and transfuse for <7. As per heme/onc transfuse irradiated and leukoreduced blood products. Trend platelets  Folic acid 1mg daily supplementation  As per conversation with Heme/oncology attending, patient pancytopenia associated with chemotherapy is expected/predictable. No further anemia w/u required. Low ANC management as per heme/onc    Blister (nonthermal) of left forearm, sequela  Assessment & Plan  Blisters of left upper extremity healing, no signs of infection, continue daily wound care. (HFpEF) heart failure with preserved ejection fraction [facility])  Assessment & Plan  Wt Readings from Last 3 Encounters:   11/26/23 75 kg (165 lb 5.5 oz)   09/07/23 74.6 kg (164 lb 6.4 oz)   08/30/23 73.3 kg (161 lb 9.6 oz)     Lab Results   Component Value Date    LVEF 65 11/21/2023     (H) 10/28/2023     (H) 09/29/2023     Echo 11/21/23: LVEF 65%. Plan:  K > 4   Measure I/O    Ambulatory dysfunction  Assessment & Plan  2/2 Impacted by chronic pain syndrome + prolonged hospital stay. Continue OOB with PT. PT rec post acute rehab however reportedly Cannot get LTACH placement while getting chemo per CM  She is aware STR SNFs continue to follow and treatment can be done from a SNF level of care. PT would need to be on trach collar for at least 72 hours with no need for the vent.  Aware that pt would need to be around 28% FiO2     Depression  Assessment & Plan  Patient on Zoloft 75 daily and Seroquel hs  Patient is depressed, multiple family/palliative discussions. patient is firm that she wants to live and to fight, she is just tired. Currently goal is disease treatment oriented. Full code. No SI/HI ideations. Palliative signed off, will reconsult if patient changes her mind regarding wishes. Chronic Superficial thrombophlebitis  Assessment & Plan  Right forearm soreness. RUE US: No acute or chronic deep vein thrombosis. 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 PO not severe enough to require renal dosing at this time, will continue to monitor and adjust dosing as needed. Continue DVT ppx with Eliquis     CKD (chronic kidney disease) stage 3, GFR 30-59 ml/min [facility])  Assessment & Plan  Lab Results   Component Value Date    EGFR 34 11/25/2023    EGFR 32 11/25/2023    EGFR 25 11/24/2023    CREATININE 1.52 (H) 11/25/2023    CREATININE 1.58 (H) 11/25/2023    CREATININE 1.93 (H) 11/24/2023     Baseline Cr between 0.75-1.1  Initial PO felt 2/2 prerenal azotemia 2/2 diuresis, reduced PO intake +/- ATN. Patient received 2 doses of Bumex IV 2 g as she had not been responding appropriately to IV 40 Lasix daily. Creatinine went up by 0.5 meeting criteria for an PO. Suspect most likely due to medications as a combination of prerenal and intrinsic. FENa was 0.2%, UA shows no casts or signs of infection, urine eosinophils 0%. Patient likely has prerenal PO from volume depletion. Received 2 L NS and 1 pRBC and cr went up by 0.07 still and Na and Cl went down, suspect that volume prevented further cr rise and free water excretion impaired by kidney function, allowing hyponatremia to increase. Suspect that now that nephrotoxic medications have been stopped she responded well to Lasix and creatinine downtrended. PO now resolved. Will be cautious about nephrotoxins and monitor as restarting EPOCH and ppx.  Patient gets volume depleted and overloaded very easily. Especially from chemo. Plan:   Creatinine up trended to 1.31 today  Continue to monitor UO/renal function. Avoid nephrotoxic agents  Follow up with BMP in the morning   Continue to monitor a.m. BMP. Pain syndrome, chronic  Assessment & Plan  Home regimen: Oxycodone 5 mg BID, Tylenol 650 mg q8 prn. Gabapentin 600 mg TID. Medical marijuana. Lumbar radiculopathy and impaired ambulatory dysfunction secondary to pain. Has bowel regimen and is having bowel movements daily. Patient required additional pain management during previous cycle and has some additional pain from tunneled line placement    Plan:  Continue gabapentin 300 mg TID, oxycodone 5 mg TID, prn Tylenol 650 mg q6. Oxycodone 5mg every 8 hours  Oxycodone 2.5 mg q6 PRN    Benign essential hypertension  Assessment & Plan  Home regimen Coreg 12.5 mg BID, Amlodipine 10 mg daily, and lisinopril 40 mg. All discontinued at this time secondary to hypotension and PO since cycle 1. but stable currently without any antihypertensives. Systolic persistently elevated and tachycardic, potentially secondary to pain. Patient was more hypotensive during last chemo. Coreg contraindicated during chemo, since cycles are every other week, would be better to stick with lopressor during duration of therapy as opposed to switching constantly. Plan:  Monitor vitals. Continue Norvasc 10 and lopressor 25 bid    Protein-calorie malnutrition ([address])  Assessment & Plan  Malnutrition Findings:   Adult Malnutrition type: Chronic illness  Adult Degree of Malnutrition: Unspecified protein calorie malnutrition  Malnutrition Characteristics: Inadequate energy, Other (comment) (stress of illness)                  360 Statement: Protein calorie malnutrition r/t inadequate intake as evidenced by >7 day period of poor intakes and stress of infection; currently treated with regular diet and nutrition supplements    BMI Findings:            Body mass index is 32.29 kg/m². Disposition: Stepdown Level 1    ICU Core Measures     Vented Patient  VAP Bundle  VAP bundle ordered     A: Assess, Prevent, and Manage Pain Has pain been assessed? Yes  Need for changes to pain regimen? No   B: Both Spontaneous Awakening Trials (SATs) and Spontaneous Breathing Trials (SBTs) Plan to perform spontaneous awakening trial today? N/A   Plan to perform spontaneous breathing trial today? N/A   Obvious barriers to extubation? NA   C: Choice of Sedation RASS Goal: 0 Alert and Calm  Need for changes to sedation or analgesia regimen? No   D: Delirium CAM-ICU: Negative   E: Early Mobility  Plan for early mobility? NA   F: Family Engagement Plan for family engagement today? NA       Antibiotic Review: Patient on appropriate coverage based on culture data. Review of Invasive Devices:      Central access plan: Medications requiring central line      Prophylaxis:  VTE VTE covered by:  apixaban, Oral, 5 mg at 11/25/23 2104       Stress Ulcer  covered byfamotidine (PEPCID) tablet 20 mg [[id number]]         Significant 24hr Events     24hr events: No overnight events reported. Pt tolerated nightly BiPAP via trach. Subjective   Review of Systems   Constitutional:  Negative for chills, fatigue and fever. HENT:  Positive for voice change. Eyes: Negative. Respiratory:  Negative for cough, chest tightness and shortness of breath. Cardiovascular:  Negative for chest pain, palpitations and leg swelling. Gastrointestinal:  Negative for abdominal pain, diarrhea, nausea and vomiting. Endocrine: Negative. Genitourinary: Negative. Neurological: Negative. Negative for dizziness, light-headedness and headaches. Hematological: Negative. All other systems reviewed and are negative.      Objective                            Vitals I/O      Most Recent Min/Max in 24hrs   Temp 97.5 °F (36.4 °C) Temp  Min: 97.3 °F (36.3 °C)  Max: 99.1 °F (37.3 °C)   Pulse 87 Pulse  Min: 84 Max: 107   Resp 18 Resp  Min: 16  Max: 35   /77 BP  Min: 127/78  Max: 155/84   O2 Sat 97 % SpO2  Min: 82 %  Max: 100 %      Intake/Output Summary (Last 24 hours) at 11/26/2023 8217  Last data filed at 11/26/2023 0200  Gross per 24 hour   Intake 1425.25 ml   Output 1325 ml   Net 100.25 ml       Diet Regular; Regular House; Potassium 2 GM    Invasive Monitoring           Physical Exam   Physical Exam  Eyes:      General: Vision grossly intact. Extraocular Movements: Extraocular movements intact. Conjunctiva/sclera: Conjunctivae normal.      Pupils: Pupils are equal, round, and reactive to light. Skin:     General: Skin is warm. HENT:      Head: Normocephalic and atraumatic. Right Ear: Hearing normal.      Left Ear: Hearing normal.      Mouth/Throat:      Mouth: Mucous membranes are moist.   Neck:      Trachea: Tracheostomy present. Comments: tunneled DL RIJ catheter w/o purulence  Cardiovascular:      Rate and Rhythm: Normal rate and regular rhythm. Pulses: Normal pulses. Heart sounds: Normal heart sounds. Abdominal: General: Bowel sounds are normal.   Constitutional:       Appearance: She is ill-appearing. Pulmonary:      Effort: No respiratory distress. Breath sounds: Rhonchi present. Chest:      Chest wall: No tenderness. Neurological:      Mental Status: She is alert and oriented to person, place and time.             Diagnostic Studies      Imaging: No imaging done in the past 24 hrs      Medications:  Scheduled PRN   acyclovir, 400 mg, BID  allopurinol, 200 mg, 4x Daily  amLODIPine, 10 mg, Daily  apixaban, 5 mg, BID  budesonide, 0.5 mg, Q12H  busPIRone, 30 mg, TID  chlorhexidine, 15 mL, Q12H [address]  [START ON 11/28/2023] cyclophosphamide (CYTOXAN) 1,350 mg in sodium chloride 0.9 % 250 mL IVPB, 750 mg/m2 (Treatment Plan Recorded), Once  DOXOrubicin (ADRIAMYCIN) 18 mg, etoposide (TOPOSAR) 67.6 mg, vinCRIStine (ONCOVIN) 0.7 mg in sodium chloride 0.9 % 500 mL infusion, , Q24H  famotidine, 20 mg, BID  fluconazole, 290 mg, Daily  folic acid, 1 mg, Daily  formoterol, 20 mcg, Q12H  gabapentin, 300 mg, TID  levalbuterol, 1.25 mg, Q6H   And  ipratropium, 0.5 mg, Q6H  levofloxacin, 250 mg, Q24H ASHLEY  melatonin, 3 mg, HS  metoprolol tartrate, 25 mg, Q12H [facility]  nicotine, 7 mg, Daily  ondansetron (ZOFRAN) 16 mg, dexamethasone (DECADRON) 10 mg in sodium chloride 0.9 % 50 mL IVPB, , Q24H  oxyCODONE, 5 mg, Q8H  polyethylene glycol, 17 g, Daily  predniSONE, 60 mg/m2 (Treatment Plan Recorded), BID With Meals  QUEtiapine, 50 mg, HS  senna, 17.6 mg, BID  sertraline, 75 mg, Daily  [START ON 11/28/2023] sodium chloride, 2,000 mL, Once  sulfamethoxazole-trimethoprim, 1 tablet, Once per day on Mon Wed Fri      alteplase, 2 mg, Q1MIN PRN  alteplase, 2 mg, Q1MIN PRN  alteplase, 2 mg, Q1MIN PRN  alteplase, 2 mg, Q1MIN PRN  ondansetron, 4 mg, Q8H PRN  ondansetron, 4 mg, Q8H PRN  oxyCODONE, 2.5 mg, Q6H PRN  sodium chloride, 20 mL/hr, Daily PRN       Continuous          Labs:    CBC    Recent Labs     11/25/23  0452 11/26/23  0617 11/26/23  0639   WBC 8.21 7.70  --    HGB 9.1* 9.3* 10.5*   HCT 29.0* 29.8* 31*    369  --    BANDSPCT 5  --   --      BMP    Recent Labs     11/25/23  0852 11/25/23  1938 11/26/23  0033 11/26/23  0639   SODIUM 141 143  --   --    K 4.6 5.5* 4.3  --     104  --   --    CO2 30 28  --  33*   AGAP 7 11  --   --    BUN 27* 30*  --   --    CREATININE 1.58* 1.52*  --   --    CALCIUM 9.8 10.0  --   --        Coags    No recent results     Additional Electrolytes  Recent Labs     11/24/23  0731 11/26/23  0639   CAIONIZED 1.27 1.31          Blood Gas    No recent results  No recent results LFTs  Recent Labs     11/25/23 0852 11/25/23 1938   ALT 8 10   AST 10* 17   ALKPHOS 71 78   ALB 3.2* 3.5   TBILI 0.32 0.32       Infectious  No recent results  Glucose  Recent Labs     11/24/23 2050 11/25/23 0852 11/25/23 1938   GLUC 160* 130 165*                 Verna Paulson Mynor Reyes MD

## 2023-11-26 NOTE — PLAN OF CARE
Problem: MOBILITY - ADULT  Goal: Maintain or return to baseline ADL function  Description: INTERVENTIONS:  -  Assess patient's ability to carry out ADLs; assess patient's baseline for ADL function and identify physical deficits which impact ability to perform ADLs (bathing, care of mouth/teeth, toileting, grooming, dressing, etc.)  - Assess/evaluate cause of self-care deficits   - Assess range of motion  - Assess patient's mobility; develop plan if impaired  - Assess patient's need for assistive devices and provide as appropriate  - Encourage maximum independence but intervene and supervise when necessary  - Involve family in performance of ADLs  - Assess for home care needs following discharge   - Consider OT consult to assist with ADL evaluation and planning for discharge  - Provide patient education as appropriate  Outcome: Progressing  Goal: Maintains/Returns to pre admission functional level  Description: INTERVENTIONS:  - Perform BMAT or MOVE assessment daily.   - Set and communicate daily mobility goal to care team and patient/family/caregiver. - Collaborate with rehabilitation services on mobility goals if consulted  - Perform Range of Motion 3 times a day. - Reposition patient every 2 hours.   - Dangle patient 3 times a day  - Stand patient 3 times a day  - Ambulate patient 3 times a day  - Out of bed to chair 3 times a day   - Out of bed for meals 3 times a day  - Out of bed for toileting  - Record patient progress and toleration of activity level   Outcome: Progressing     Problem: Prexisting or High Potential for Compromised Skin Integrity  Goal: Skin integrity is maintained or improved  Description: INTERVENTIONS:  - Identify patients at risk for skin breakdown  - Assess and monitor skin integrity  - Assess and monitor nutrition and hydration status  - Monitor labs   - Assess for incontinence   - Turn and reposition patient  - Assist with mobility/ambulation  - Relieve pressure over bony prominences  - Avoid friction and shearing  - Provide appropriate hygiene as needed including keeping skin clean and dry  - Evaluate need for skin moisturizer/barrier cream  - Collaborate with interdisciplinary team   - Patient/family teaching  - Consider wound care consult   Outcome: Progressing     Problem: Nutrition/Hydration-ADULT  Goal: Nutrient/Hydration intake appropriate for improving, restoring or maintaining nutritional needs  Description: Monitor and assess patient's nutrition/hydration status for malnutrition. Collaborate with interdisciplinary team and initiate plan and interventions as ordered. Monitor patient's weight and dietary intake as ordered or per policy. Utilize nutrition screening tool and intervene as necessary. Determine patient's food preferences and provide high-protein, high-caloric foods as appropriate.      INTERVENTIONS:  - Monitor oral intake, urinary output, labs, and treatment plans  - Assess nutrition and hydration status and recommend course of action  - Evaluate amount of meals eaten  - Assist patient with eating if necessary   - Allow adequate time for meals  - Recommend/ encourage appropriate diets, oral nutritional supplements, and vitamin/mineral supplements  - Order, calculate, and assess calorie counts as needed  - Recommend, monitor, and adjust tube feedings and TPN/PPN based on assessed needs  - Assess need for intravenous fluids  - Provide specific nutrition/hydration education as appropriate  - Include patient/family/caregiver in decisions related to nutrition  Outcome: Progressing     Problem: PAIN - ADULT  Goal: Verbalizes/displays adequate comfort level or baseline comfort level  Description: Interventions:  - Encourage patient to monitor pain and request assistance  - Assess pain using appropriate pain scale  - Administer analgesics based on type and severity of pain and evaluate response  - Implement non-pharmacological measures as appropriate and evaluate response  - Consider cultural and social influences on pain and pain management  - Notify physician/advanced practitioner if interventions unsuccessful or patient reports new pain  Outcome: Progressing     Problem: INFECTION - ADULT  Goal: Absence or prevention of progression during hospitalization  Description: INTERVENTIONS:  - Assess and monitor for signs and symptoms of infection  - Monitor lab/diagnostic results  - Monitor all insertion sites, i.e. indwelling lines, tubes, and drains  - Monitor endotracheal if appropriate and nasal secretions for changes in amount and color  - [place] appropriate cooling/warming therapies per order  - Administer medications as ordered  - Instruct and encourage patient and family to use good hand hygiene technique  - Identify and instruct in appropriate isolation precautions for identified infection/condition  Outcome: Progressing  Goal: Absence of fever/infection during neutropenic period  Description: INTERVENTIONS:  - Monitor WBC    Outcome: Progressing     Problem: SAFETY ADULT  Goal: Maintain or return to baseline ADL function  Description: INTERVENTIONS:  -  Assess patient's ability to carry out ADLs; assess patient's baseline for ADL function and identify physical deficits which impact ability to perform ADLs (bathing, care of mouth/teeth, toileting, grooming, dressing, etc.)  - Assess/evaluate cause of self-care deficits   - Assess range of motion  - Assess patient's mobility; develop plan if impaired  - Assess patient's need for assistive devices and provide as appropriate  - Encourage maximum independence but intervene and supervise when necessary  - Involve family in performance of ADLs  - Assess for home care needs following discharge   - Consider OT consult to assist with ADL evaluation and planning for discharge  - Provide patient education as appropriate  Outcome: Progressing  Goal: Maintains/Returns to pre admission functional level  Description: INTERVENTIONS:  - Perform BMAT or MOVE assessment daily.   - Set and communicate daily mobility goal to care team and patient/family/caregiver. - Collaborate with rehabilitation services on mobility goals if consulted  - Perform Range of Motion 3 times a day. - Reposition patient every 2 hours.   - Dangle patient 3 times a day  - Stand patient 3 times a day  - Ambulate patient 3 times a day  - Out of bed to chair 3 times a day   - Out of bed for meals 3 times a day  - Out of bed for toileting  - Record patient progress and toleration of activity level   Outcome: Progressing  Goal: Patient will remain free of falls  Description: INTERVENTIONS:  - Educate patient/family on patient safety including physical limitations  - Instruct patient to call for assistance with activity   - Consult OT/PT to assist with strengthening/mobility   - Keep Call bell within reach  - Keep bed low and locked with side rails adjusted as appropriate  - Keep care items and personal belongings within reach  - Initiate and maintain comfort rounds  - Make Fall Risk Sign visible to staff  - Offer Toileting every 2 Hours, in advance of need  - Initiate/Maintain bed alarm  - Obtain necessary fall risk management equipment  - Apply yellow socks and bracelet for high fall risk patients  - Consider moving patient to room near nurses station  Outcome: Progressing     Problem: DISCHARGE PLANNING  Goal: Discharge to home or other facility with appropriate resources  Description: INTERVENTIONS:  - Identify barriers to discharge w/patient and caregiver  - Arrange for needed discharge resources and transportation as appropriate  - Identify discharge learning needs (meds, wound care, etc.)  - Arrange for interpretive services to assist at discharge as needed  - Refer to Case Management Department for coordinating discharge planning if the patient needs post-hospital services based on physician/advanced practitioner order or complex needs related to functional status, cognitive ability, or social support system  Outcome: Progressing     Problem: Knowledge Deficit  Goal: Patient/family/caregiver demonstrates understanding of disease process, treatment plan, medications, and discharge instructions  Description: Complete learning assessment and assess knowledge base.   Interventions:  - Provide teaching at level of understanding  - Provide teaching via preferred learning methods  Outcome: Progressing     Problem: RESPIRATORY - ADULT  Goal: Achieves optimal ventilation and oxygenation  Description: INTERVENTIONS:  - Assess for changes in respiratory status  - Assess for changes in mentation and behavior  - Position to facilitate oxygenation and minimize respiratory effort  - Oxygen administered by appropriate delivery if ordered  - Initiate smoking cessation education as indicated  - Encourage broncho-pulmonary hygiene including cough, deep breathe, Incentive Spirometry  - Assess the need for suctioning and aspirate as needed  - Assess and instruct to report SOB or any respiratory difficulty  - Respiratory Therapy support as indicated  Outcome: Progressing     Problem: GENITOURINARY - ADULT  Goal: Maintains or returns to baseline urinary function  Description: INTERVENTIONS:  - Assess urinary function  - Encourage oral fluids to ensure adequate hydration if ordered  - Administer IV fluids as ordered to ensure adequate hydration  - Administer ordered medications as needed  - Offer frequent toileting  - Follow urinary retention protocol if ordered  Outcome: Progressing  Goal: Absence of urinary retention  Description: INTERVENTIONS:  - Assess patient’s ability to void and empty bladder  - Monitor I/O  - Bladder scan as needed  - Discuss with physician/AP medications to alleviate retention as needed  - Discuss catheterization for long term situations as appropriate  Outcome: Progressing     Problem: METABOLIC, FLUID AND ELECTROLYTES - ADULT  Goal: Electrolytes maintained within normal limits  Description: INTERVENTIONS:  - Monitor labs and assess patient for signs and symptoms of electrolyte imbalances  - Administer electrolyte replacement as ordered  - Monitor response to electrolyte replacements, including repeat lab results as appropriate  - Instruct patient on fluid and nutrition as appropriate  Outcome: Progressing  Goal: Fluid balance maintained  Description: INTERVENTIONS:  - Monitor labs   - Monitor I/O and WT  - Instruct patient on fluid and nutrition as appropriate  - Assess for signs & symptoms of volume excess or deficit  Outcome: Progressing  Goal: Glucose maintained within target range  Description: INTERVENTIONS:  - Monitor Blood Glucose as ordered  - Assess for signs and symptoms of hyperglycemia and hypoglycemia  - Administer ordered medications to maintain glucose within target range  - Assess nutritional intake and initiate nutrition service referral as needed  Outcome: Progressing normal...

## 2023-11-26 NOTE — QUICK NOTE
Patient's daughter was called, informed about current treatment plan. Daughter was also updated about patient's status. All of the questions and concerns were answered.

## 2023-11-27 LAB
ALBUMIN SERPL BCP-MCNC: 3.3 G/DL (ref 3.5–5)
ALP SERPL-CCNC: 61 U/L (ref 34–104)
ALT SERPL W P-5'-P-CCNC: 14 U/L (ref 7–52)
ANION GAP SERPL CALCULATED.3IONS-SCNC: 7 MMOL/L
AST SERPL W P-5'-P-CCNC: 16 U/L (ref 13–39)
BASOPHILS # BLD AUTO: 0.01 THOUSANDS/ÂΜL (ref 0–0.1)
BASOPHILS NFR BLD AUTO: 0 % (ref 0–1)
BILIRUB SERPL-MCNC: 0.33 MG/DL (ref 0.2–1)
BUN SERPL-MCNC: 31 MG/DL (ref 5–25)
CALCIUM ALBUM COR SERPL-MCNC: 10.3 MG/DL (ref 8.3–10.1)
CALCIUM SERPL-MCNC: 9.7 MG/DL (ref 8.4–10.2)
CHLORIDE SERPL-SCNC: 106 MMOL/L (ref 96–108)
CO2 SERPL-SCNC: 30 MMOL/L (ref 21–32)
CREAT SERPL-MCNC: 1.15 MG/DL (ref 0.6–1.3)
EOSINOPHIL # BLD AUTO: 0 THOUSAND/ÂΜL (ref 0–0.61)
EOSINOPHIL NFR BLD AUTO: 0 % (ref 0–6)
ERYTHROCYTE [DISTWIDTH] IN BLOOD BY AUTOMATED COUNT: 15.9 % (ref 11.6–15.1)
FUNGUS SPEC CULT: NORMAL
FUNGUS SPEC CULT: NORMAL
G6PD BLD QN: 316 U/10E12 RBC (ref 127–427)
GFR SERPL CREATININE-BSD FRML MDRD: 48 ML/MIN/1.73SQ M
GLUCOSE SERPL-MCNC: 138 MG/DL (ref 65–140)
HCT VFR BLD AUTO: 32.2 % (ref 34.8–46.1)
HGB BLD-MCNC: 10.2 G/DL (ref 11.5–15.4)
IMM GRANULOCYTES # BLD AUTO: 0.05 THOUSAND/UL (ref 0–0.2)
IMM GRANULOCYTES NFR BLD AUTO: 1 % (ref 0–2)
LDH SERPL-CCNC: 193 U/L (ref 140–271)
LYMPHOCYTES # BLD AUTO: 0.12 THOUSANDS/ÂΜL (ref 0.6–4.47)
LYMPHOCYTES NFR BLD AUTO: 3 % (ref 14–44)
MCH RBC QN AUTO: 29.7 PG (ref 26.8–34.3)
MCHC RBC AUTO-ENTMCNC: 31.7 G/DL (ref 31.4–37.4)
MCV RBC AUTO: 94 FL (ref 82–98)
MONOCYTES # BLD AUTO: 0.11 THOUSAND/ÂΜL (ref 0.17–1.22)
MONOCYTES NFR BLD AUTO: 2 % (ref 4–12)
NEUTROPHILS # BLD AUTO: 4.53 THOUSANDS/ÂΜL (ref 1.85–7.62)
NEUTS SEG NFR BLD AUTO: 94 % (ref 43–75)
NRBC BLD AUTO-RTO: 0 /100 WBCS
PLATELET # BLD AUTO: 388 THOUSANDS/UL (ref 149–390)
PMV BLD AUTO: 9.1 FL (ref 8.9–12.7)
POTASSIUM SERPL-SCNC: 4 MMOL/L (ref 3.5–5.3)
PROT SERPL-MCNC: 6.5 G/DL (ref 6.4–8.4)
RBC # BLD AUTO: 3.25 X10E6/UL (ref 3.77–5.28)
RBC # BLD AUTO: 3.44 MILLION/UL (ref 3.81–5.12)
SODIUM SERPL-SCNC: 143 MMOL/L (ref 135–147)
WBC # BLD AUTO: 4.82 THOUSAND/UL (ref 4.31–10.16)

## 2023-11-27 PROCEDURE — 94640 AIRWAY INHALATION TREATMENT: CPT

## 2023-11-27 PROCEDURE — 85025 COMPLETE CBC W/AUTO DIFF WBC: CPT | Performed by: INTERNAL MEDICINE

## 2023-11-27 PROCEDURE — 94003 VENT MGMT INPAT SUBQ DAY: CPT

## 2023-11-27 PROCEDURE — 99291 CRITICAL CARE FIRST HOUR: CPT | Performed by: INTERNAL MEDICINE

## 2023-11-27 PROCEDURE — 94760 N-INVAS EAR/PLS OXIMETRY 1: CPT

## 2023-11-27 PROCEDURE — 94150 VITAL CAPACITY TEST: CPT

## 2023-11-27 PROCEDURE — 83615 LACTATE (LD) (LDH) ENZYME: CPT | Performed by: INTERNAL MEDICINE

## 2023-11-27 PROCEDURE — 97530 THERAPEUTIC ACTIVITIES: CPT

## 2023-11-27 PROCEDURE — 80053 COMPREHEN METABOLIC PANEL: CPT

## 2023-11-27 PROCEDURE — 99233 SBSQ HOSP IP/OBS HIGH 50: CPT | Performed by: INTERNAL MEDICINE

## 2023-11-27 PROCEDURE — 92526 ORAL FUNCTION THERAPY: CPT

## 2023-11-27 PROCEDURE — 97164 PT RE-EVAL EST PLAN CARE: CPT

## 2023-11-27 RX ADMIN — OXYCODONE HYDROCHLORIDE 5 MG: 5 SOLUTION ORAL at 19:30

## 2023-11-27 RX ADMIN — GABAPENTIN 300 MG: 300 CAPSULE ORAL at 17:11

## 2023-11-27 RX ADMIN — PREDNISONE 100 MG: 50 TABLET ORAL at 17:12

## 2023-11-27 RX ADMIN — CHLORHEXIDINE GLUCONATE 15 ML: 1.2 SOLUTION ORAL at 08:03

## 2023-11-27 RX ADMIN — ALLOPURINOL 200 MG: 100 TABLET ORAL at 17:12

## 2023-11-27 RX ADMIN — LEVOFLOXACIN 250 MG: 250 TABLET, FILM COATED ORAL at 08:03

## 2023-11-27 RX ADMIN — BUSPIRONE HYDROCHLORIDE 30 MG: 10 TABLET ORAL at 21:19

## 2023-11-27 RX ADMIN — METOPROLOL TARTRATE 25 MG: 25 TABLET, FILM COATED ORAL at 08:03

## 2023-11-27 RX ADMIN — ONDANSETRON: 2 INJECTION INTRAMUSCULAR; INTRAVENOUS at 08:21

## 2023-11-27 RX ADMIN — QUETIAPINE FUMARATE 50 MG: 25 TABLET ORAL at 21:18

## 2023-11-27 RX ADMIN — BUDESONIDE 0.5 MG: 0.5 INHALANT ORAL at 09:19

## 2023-11-27 RX ADMIN — FORMOTEROL FUMARATE DIHYDRATE 20 MCG: 20 SOLUTION RESPIRATORY (INHALATION) at 20:34

## 2023-11-27 RX ADMIN — BUSPIRONE HYDROCHLORIDE 30 MG: 10 TABLET ORAL at 08:03

## 2023-11-27 RX ADMIN — SULFAMETHOXAZOLE AND TRIMETHOPRIM 1 TABLET: 800; 160 TABLET ORAL at 08:03

## 2023-11-27 RX ADMIN — ALLOPURINOL 200 MG: 100 TABLET ORAL at 08:03

## 2023-11-27 RX ADMIN — LEVALBUTEROL HYDROCHLORIDE 1.25 MG: 1.25 SOLUTION RESPIRATORY (INHALATION) at 01:58

## 2023-11-27 RX ADMIN — FLUCONAZOLE 200 MG: 100 TABLET ORAL at 08:03

## 2023-11-27 RX ADMIN — ETOPOSIDE: 20 INJECTION, SOLUTION INTRAVENOUS at 09:37

## 2023-11-27 RX ADMIN — OXYCODONE HYDROCHLORIDE 5 MG: 5 SOLUTION ORAL at 05:00

## 2023-11-27 RX ADMIN — LEVALBUTEROL HYDROCHLORIDE 1.25 MG: 1.25 SOLUTION RESPIRATORY (INHALATION) at 09:19

## 2023-11-27 RX ADMIN — ACYCLOVIR 400 MG: 200 CAPSULE ORAL at 17:12

## 2023-11-27 RX ADMIN — AMLODIPINE BESYLATE 10 MG: 5 TABLET ORAL at 08:03

## 2023-11-27 RX ADMIN — BUDESONIDE 0.5 MG: 0.5 INHALANT ORAL at 20:34

## 2023-11-27 RX ADMIN — APIXABAN 5 MG: 5 TABLET, FILM COATED ORAL at 21:18

## 2023-11-27 RX ADMIN — GABAPENTIN 300 MG: 300 CAPSULE ORAL at 08:03

## 2023-11-27 RX ADMIN — ALLOPURINOL 200 MG: 100 TABLET ORAL at 12:35

## 2023-11-27 RX ADMIN — BUSPIRONE HYDROCHLORIDE 30 MG: 10 TABLET ORAL at 17:12

## 2023-11-27 RX ADMIN — CHLORHEXIDINE GLUCONATE 15 ML: 1.2 SOLUTION ORAL at 21:17

## 2023-11-27 RX ADMIN — IPRATROPIUM BROMIDE 0.5 MG: 0.5 SOLUTION RESPIRATORY (INHALATION) at 01:58

## 2023-11-27 RX ADMIN — NICOTINE 7 MG: 7 PATCH, EXTENDED RELEASE TRANSDERMAL at 08:04

## 2023-11-27 RX ADMIN — GABAPENTIN 300 MG: 300 CAPSULE ORAL at 21:17

## 2023-11-27 RX ADMIN — SERTRALINE 75 MG: 25 TABLET, FILM COATED ORAL at 08:03

## 2023-11-27 RX ADMIN — FAMOTIDINE 20 MG: 20 TABLET, FILM COATED ORAL at 08:03

## 2023-11-27 RX ADMIN — METOPROLOL TARTRATE 25 MG: 25 TABLET, FILM COATED ORAL at 21:17

## 2023-11-27 RX ADMIN — FOLIC ACID 1 MG: 1 TABLET ORAL at 08:03

## 2023-11-27 RX ADMIN — FAMOTIDINE 20 MG: 20 TABLET, FILM COATED ORAL at 17:11

## 2023-11-27 RX ADMIN — PREDNISONE 100 MG: 50 TABLET ORAL at 08:02

## 2023-11-27 RX ADMIN — MELATONIN 3 MG: at 21:18

## 2023-11-27 RX ADMIN — IPRATROPIUM BROMIDE 0.5 MG: 0.5 SOLUTION RESPIRATORY (INHALATION) at 20:34

## 2023-11-27 RX ADMIN — OXYCODONE HYDROCHLORIDE 5 MG: 5 SOLUTION ORAL at 12:36

## 2023-11-27 RX ADMIN — LEVALBUTEROL HYDROCHLORIDE 1.25 MG: 1.25 SOLUTION RESPIRATORY (INHALATION) at 20:34

## 2023-11-27 RX ADMIN — IPRATROPIUM BROMIDE 0.5 MG: 0.5 SOLUTION RESPIRATORY (INHALATION) at 09:19

## 2023-11-27 RX ADMIN — ALLOPURINOL 200 MG: 100 TABLET ORAL at 21:17

## 2023-11-27 RX ADMIN — FORMOTEROL FUMARATE DIHYDRATE 20 MCG: 20 SOLUTION RESPIRATORY (INHALATION) at 09:19

## 2023-11-27 RX ADMIN — ACYCLOVIR 400 MG: 200 CAPSULE ORAL at 08:04

## 2023-11-27 RX ADMIN — APIXABAN 5 MG: 5 TABLET, FILM COATED ORAL at 08:03

## 2023-11-27 NOTE — ASSESSMENT & PLAN NOTE
9/14 Neck/ soft tissue CT: Large enhancing soft tissue mass identified within the left neck involving multiple spaces. Centered within the left oropharynx with extensions as described above into multiple spaces. This results in significant airway compromise. 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 Small level 3/level 4 nodes are seen within the neck. Tracheostomy by ENT. Replaced on 9/26. H/o tobacco abuse, daily smoker. On nicotine while inpatient, confirmed with patient she needs nicotine patch. CT soft tissue neck shows reduced mass effect from 9/14 to 10/13. Can consider reducing trach size if continues to improve. Patient often refusing peg tube feeds but is feeding herself small meals orally. Plan:  ENT and heme onc following  Pt receiving her 4th cycle of chemotherapy - Day  (11/26)  As per speech therapist diet  Dysphagia 2. Continue parallel PEG tube feeds for nutrition. See acute respiratory failure and large B cell lymphoma above.

## 2023-11-27 NOTE — ASSESSMENT & PLAN NOTE
Wt Readings from Last 3 Encounters:   11/27/23 75.8 kg (167 lb 1.7 oz)   09/07/23 74.6 kg (164 lb 6.4 oz)   08/30/23 73.3 kg (161 lb 9.6 oz)     Lab Results   Component Value Date    LVEF 65 11/21/2023     (H) 10/28/2023     (H) 09/29/2023     Echo 11/21/23: LVEF 65%.        Plan:  K > 4   Measure I/O

## 2023-11-27 NOTE — PLAN OF CARE
Problem: MOBILITY - ADULT  Goal: Maintain or return to baseline ADL function  Description: INTERVENTIONS:  -  Assess patient's ability to carry out ADLs; assess patient's baseline for ADL function and identify physical deficits which impact ability to perform ADLs (bathing, care of mouth/teeth, toileting, grooming, dressing, etc.)  - Assess/evaluate cause of self-care deficits   - Assess range of motion  - Assess patient's mobility; develop plan if impaired  - Assess patient's need for assistive devices and provide as appropriate  - Encourage maximum independence but intervene and supervise when necessary  - Involve family in performance of ADLs  - Assess for home care needs following discharge   - Consider OT consult to assist with ADL evaluation and planning for discharge  - Provide patient education as appropriate  Outcome: Progressing  Goal: Maintains/Returns to pre admission functional level  Description: INTERVENTIONS:  - Perform BMAT or MOVE assessment daily.   - Set and communicate daily mobility goal to care team and patient/family/caregiver. - Collaborate with rehabilitation services on mobility goals if consulted  - Perform Range of Motion 3 times a day. - Reposition patient every 2 hours.   - Dangle patient 3 times a day  - Stand patient 3 times a day  - Ambulate patient 3 times a day  - Out of bed to chair 3 times a day   - Out of bed for meals 3 times a day  - Out of bed for toileting  - Record patient progress and toleration of activity level   Outcome: Progressing     Problem: Prexisting or High Potential for Compromised Skin Integrity  Goal: Skin integrity is maintained or improved  Description: INTERVENTIONS:  - Identify patients at risk for skin breakdown  - Assess and monitor skin integrity  - Assess and monitor nutrition and hydration status  - Monitor labs   - Assess for incontinence   - Turn and reposition patient  - Assist with mobility/ambulation  - Relieve pressure over bony prominences  - Avoid friction and shearing  - Provide appropriate hygiene as needed including keeping skin clean and dry  - Evaluate need for skin moisturizer/barrier cream  - Collaborate with interdisciplinary team   - Patient/family teaching  - Consider wound care consult   Outcome: Progressing     Problem: Nutrition/Hydration-ADULT  Goal: Nutrient/Hydration intake appropriate for improving, restoring or maintaining nutritional needs  Description: Monitor and assess patient's nutrition/hydration status for malnutrition. Collaborate with interdisciplinary team and initiate plan and interventions as ordered. Monitor patient's weight and dietary intake as ordered or per policy. Utilize nutrition screening tool and intervene as necessary. Determine patient's food preferences and provide high-protein, high-caloric foods as appropriate.      INTERVENTIONS:  - Monitor oral intake, urinary output, labs, and treatment plans  - Assess nutrition and hydration status and recommend course of action  - Evaluate amount of meals eaten  - Assist patient with eating if necessary   - Allow adequate time for meals  - Recommend/ encourage appropriate diets, oral nutritional supplements, and vitamin/mineral supplements  - Order, calculate, and assess calorie counts as needed  - Recommend, monitor, and adjust tube feedings and TPN/PPN based on assessed needs  - Assess need for intravenous fluids  - Provide specific nutrition/hydration education as appropriate  - Include patient/family/caregiver in decisions related to nutrition  Outcome: Progressing     Problem: PAIN - ADULT  Goal: Verbalizes/displays adequate comfort level or baseline comfort level  Description: Interventions:  - Encourage patient to monitor pain and request assistance  - Assess pain using appropriate pain scale  - Administer analgesics based on type and severity of pain and evaluate response  - Implement non-pharmacological measures as appropriate and evaluate response  - Consider cultural and social influences on pain and pain management  - Notify physician/advanced practitioner if interventions unsuccessful or patient reports new pain  Outcome: Progressing     Problem: INFECTION - ADULT  Goal: Absence or prevention of progression during hospitalization  Description: INTERVENTIONS:  - Assess and monitor for signs and symptoms of infection  - Monitor lab/diagnostic results  - Monitor all insertion sites, i.e. indwelling lines, tubes, and drains  - Monitor endotracheal if appropriate and nasal secretions for changes in amount and color  - [place] appropriate cooling/warming therapies per order  - Administer medications as ordered  - Instruct and encourage patient and family to use good hand hygiene technique  - Identify and instruct in appropriate isolation precautions for identified infection/condition  Outcome: Progressing  Goal: Absence of fever/infection during neutropenic period  Description: INTERVENTIONS:  - Monitor WBC    Outcome: Progressing     Problem: SAFETY ADULT  Goal: Maintain or return to baseline ADL function  Description: INTERVENTIONS:  -  Assess patient's ability to carry out ADLs; assess patient's baseline for ADL function and identify physical deficits which impact ability to perform ADLs (bathing, care of mouth/teeth, toileting, grooming, dressing, etc.)  - Assess/evaluate cause of self-care deficits   - Assess range of motion  - Assess patient's mobility; develop plan if impaired  - Assess patient's need for assistive devices and provide as appropriate  - Encourage maximum independence but intervene and supervise when necessary  - Involve family in performance of ADLs  - Assess for home care needs following discharge   - Consider OT consult to assist with ADL evaluation and planning for discharge  - Provide patient education as appropriate  Outcome: Progressing  Goal: Maintains/Returns to pre admission functional level  Description: INTERVENTIONS:  - Perform BMAT or MOVE assessment daily.   - Set and communicate daily mobility goal to care team and patient/family/caregiver. - Collaborate with rehabilitation services on mobility goals if consulted  - Perform Range of Motion 3 times a day. - Reposition patient every 2 hours.   - Dangle patient 3 times a day  - Stand patient 3 times a day  - Ambulate patient 3 times a day  - Out of bed to chair 3 times a day   - Out of bed for meals 3 times a day  - Out of bed for toileting  - Record patient progress and toleration of activity level   Outcome: Progressing  Goal: Patient will remain free of falls  Description: INTERVENTIONS:  - Educate patient/family on patient safety including physical limitations  - Instruct patient to call for assistance with activity   - Consult OT/PT to assist with strengthening/mobility   - Keep Call bell within reach  - Keep bed low and locked with side rails adjusted as appropriate  - Keep care items and personal belongings within reach  - Initiate and maintain comfort rounds  - Make Fall Risk Sign visible to staff  - Offer Toileting every 2 Hours, in advance of need  - Initiate/Maintain bed alarm  - Obtain necessary fall risk management equipment  - Apply yellow socks and bracelet for high fall risk patients  - Consider moving patient to room near nurses station  Outcome: Progressing     Problem: DISCHARGE PLANNING  Goal: Discharge to home or other facility with appropriate resources  Description: INTERVENTIONS:  - Identify barriers to discharge w/patient and caregiver  - Arrange for needed discharge resources and transportation as appropriate  - Identify discharge learning needs (meds, wound care, etc.)  - Arrange for interpretive services to assist at discharge as needed  - Refer to Case Management Department for coordinating discharge planning if the patient needs post-hospital services based on physician/advanced practitioner order or complex needs related to functional status, cognitive ability, or social support system  Outcome: Progressing     Problem: Knowledge Deficit  Goal: Patient/family/caregiver demonstrates understanding of disease process, treatment plan, medications, and discharge instructions  Description: Complete learning assessment and assess knowledge base.   Interventions:  - Provide teaching at level of understanding  - Provide teaching via preferred learning methods  Outcome: Progressing     Problem: RESPIRATORY - ADULT  Goal: Achieves optimal ventilation and oxygenation  Description: INTERVENTIONS:  - Assess for changes in respiratory status  - Assess for changes in mentation and behavior  - Position to facilitate oxygenation and minimize respiratory effort  - Oxygen administered by appropriate delivery if ordered  - Initiate smoking cessation education as indicated  - Encourage broncho-pulmonary hygiene including cough, deep breathe, Incentive Spirometry  - Assess the need for suctioning and aspirate as needed  - Assess and instruct to report SOB or any respiratory difficulty  - Respiratory Therapy support as indicated  Outcome: Progressing     Problem: GENITOURINARY - ADULT  Goal: Maintains or returns to baseline urinary function  Description: INTERVENTIONS:  - Assess urinary function  - Encourage oral fluids to ensure adequate hydration if ordered  - Administer IV fluids as ordered to ensure adequate hydration  - Administer ordered medications as needed  - Offer frequent toileting  - Follow urinary retention protocol if ordered  Outcome: Progressing  Goal: Absence of urinary retention  Description: INTERVENTIONS:  - Assess patient’s ability to void and empty bladder  - Monitor I/O  - Bladder scan as needed  - Discuss with physician/AP medications to alleviate retention as needed  - Discuss catheterization for long term situations as appropriate  Outcome: Progressing     Problem: METABOLIC, FLUID AND ELECTROLYTES - ADULT  Goal: Electrolytes maintained within normal limits  Description: INTERVENTIONS:  - Monitor labs and assess patient for signs and symptoms of electrolyte imbalances  - Administer electrolyte replacement as ordered  - Monitor response to electrolyte replacements, including repeat lab results as appropriate  - Instruct patient on fluid and nutrition as appropriate  Outcome: Progressing  Goal: Fluid balance maintained  Description: INTERVENTIONS:  - Monitor labs   - Monitor I/O and WT  - Instruct patient on fluid and nutrition as appropriate  - Assess for signs & symptoms of volume excess or deficit  Outcome: Progressing  Goal: Glucose maintained within target range  Description: INTERVENTIONS:  - Monitor Blood Glucose as ordered  - Assess for signs and symptoms of hyperglycemia and hypoglycemia  - Administer ordered medications to maintain glucose within target range  - Assess nutritional intake and initiate nutrition service referral as needed  Outcome: Progressing

## 2023-11-27 NOTE — ASSESSMENT & PLAN NOTE
Large B-cell lymphoma, stage II. First chemo cycle 9/22 4 days of R-EPOCH. 9/27 Rituxan. 9/28 Filgrastim. Received nivestym 300 mc 7 days dcd on 10/26   Discontinue Prophylaxis as per Heme Oncology recommendations  Pt had hyperkalemia of 5.5, can be due to Bactrim, consider switching to atovaquone      9/22  4 days of R-EPOCH.  9/27 Rituxan  10/13 for [facility] cycle 2, which was done over 4 days and cytoxan on 10/19.  11/6 completed R-EPOCH cycle 3 and Cytoxan 11/7. Plan:  Continue with current chemo treatment 11/24-11/28. Continue Bactrim, Acyclovir, Allopurinol for prophylaxis   Transfuse blood products as needed.   Keep Hgb>7 and Plt>20 for chemo   See acute respiratory failure above

## 2023-11-27 NOTE — PHYSICAL THERAPY NOTE
Physical Therapy Re-Evaluation    Patient's Name: Ahmad Alpers    Admitting Diagnosis  Angioedema, initial encounter Maddie Girard. 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    Myofascial pain syndrome    Pain syndrome, chronic    Angio-edema, initial encounter    Bipolar disorder, unspecified ([address])    Nicotine dependence    Bone lesion    CKD (chronic kidney disease) stage 3, GFR 30-59 ml/min (HCC)    Acute respiratory failure with hypoxia secondary to oropharyngeal mass    (HFpEF) heart failure with preserved ejection fraction (HCC)    Pulmonary embolism (HCC)    Ambulatory dysfunction    Oropharyngeal mass    Blister (nonthermal) of left forearm, sequela    Large cell lymphoma (HCC)    Chemotherapy induced neutropenia     Chronic Superficial thrombophlebitis    Depression    Pancytopenia due to chemotherapy ([address])    Protein-calorie malnutrition ([address])    Acute embolism and thrombosis of right internal jugular vein ([address])       Past Medical History  Past Medical History:   Diagnosis Date    Anxiety     Depression     Fatty liver     Hyperlipidemia     Hypertension     Psychiatric disorder     Varicella        Past Surgical History  Past Surgical History:   Procedure Laterality Date    BREAST SURGERY Bilateral     Reduction Procedure    CARDIAC SURGERY       SECTION      x4    DILATION AND CURETTAGE OF UTERUS      ECTOPIC PREGNANCY SURGERY      IR GASTROSTOMY TUBE PLACEMENT  2023    IR TUNNELED CENTRAL LINE PLACEMENT  10/19/2023     [address] INCL FLUOR GDNCE DX W/CELL WASHG SPX N/A 2023    Procedure: BRONCHOSCOPY FLEXIBLE;  Surgeon: Dave Weston MD;  Location: [facility];  Service: ENT    TRACHEOSTOMY N/A 2023    Procedure: TRACHEOSTOMY, biopsy of nasopharynx mass;  Surgeon: Dave Weston MD;  Location: [facility];  Service: ENT        [id number]   Note Type   Note type Re-Evaluation   Pain Assessment   Pain Assessment Tool 0-10   Pain Score No Pain   Restrictions/Precautions   Weight Bearing Precautions Per Order No   Other Precautions Chair Alarm; Bed Alarm; Fall Risk;O2;Telemetry;Multiple lines   Prior Function   Comments please refer to inital evaluation for PLOF and home set up   General   Family/Caregiver Present Yes   Cognition   Orientation Level Oriented X4   Memory Within functional limits   Following Commands Follows one step commands without difficulty   Comments pt ID by wristband, name and    Subjective   Subjective pt agreeable to PT treatment/reevaluation   Strength RLE   R Hip Flexion 3+/5   R Knee Flexion 4-/5   R Knee Extension 4/5   R Ankle Dorsiflexion 4/5   R Hip ABduction 3+/5   Strength LLE   L Hip Flexion 4-/5   L Knee Extension 4-/5   L Ankle Dorsiflexion 4-/5   L Knee Flexion 4-/5   L Hip ABduction 4-/5   Five Times Sit To Stand   Time (Seconds) 40 Seconds  (with BL UE support)   Bed Mobility   Supine to Sit 5  Supervision   Additional items HOB elevated; Increased time required   Sit to Supine 5  Supervision   Additional items HOB elevated; Increased time required   Additional Comments pt sits EOB with close supervision, denies light headedness/dizziness however endorses fatigue   Transfers   Sit to Stand 5  Supervision   Additional items Increased time required   Stand to Sit 5  Supervision   Additional items Increased time required   Toilet transfer 4  Minimal assistance   Additional items Assist x 1; Increased time required;Commode;Armrests   Additional Comments x9 STS performed throughout session, HHA for STS from bed side commode. p   Ambulation/Elevation   Gait pattern Improper Weight shift;Decreased foot clearance; Short stride; Excessively slow   Gait Assistance 4  Minimal assist   Additional items Assist x 1   Assistive Device   (HHA)   Distance 5'x2  (pt declined further ambulation 2/2 fatigue and feeling poor)   Ambulation/Elevation Additional Comments pt declined performance of stairs today due to fatigue. pt did take lateral steps toward [facility] with CGA   Balance   Static Sitting Fair +   Dynamic Sitting Fair +   Static Standing Fair   Dynamic Standing Fair -   Ambulatory Fair -  (HHA)   Activity Tolerance   Activity Tolerance Patient limited by fatigue;Treatment limited secondary to medical complications (Comment)  (deconditioning and fatigue)   Medical Staff Made Aware spoke with CM, spoke with OT   Nurse Made Aware TINO Delaney pre/post   Assessment   Prognosis Fair   Problem List Decreased strength;Decreased endurance; Impaired balance;Decreased mobility; Impaired vision; Impaired judgement;Obesity; Decreased skin integrity   Assessment Pt seen for PT reevaluation given  goals. Pt continues to demonstrate fluctuating levels of progression, however this is secondary to medical status. Pt does not require significant physical assistance with functional mobility. However throughout her course of stay her overall progression has been limited due to her medical status, increased O2 demands. Pt does demonstrate improvement within her most recent visits. Requiring less physical assistance for STS transfers, increased ambulatory distances. Pt does continue to demonstrate deficits in LE strenght, endurance and increased fall risk given performs of 5s STS test this session. Pt would continue to benefit from skilled PT services to address functional deficits and return to improved level of function. Pt would continue to benefit on OOB mobility, progression of ambulatory distances, progression of LE strengthening as well as challenging overall activity tolerance. Continue to reccomend a level II disposition. Pt POC date updated this session as well as goals to reflect pt current level of function. Goals   Patient Goals no therapy goals stated this session   STG Expiration Date 23   Short Term Goal #1 pt will be able to: 1.  Demonstrate ability to perform all aspects of bed mobility independently to improve functional safety. Progressing 2. Perform functional transfers independently to facilitate safe return to previous living environment. PROGRESSING 3. Ambulate 50 ft with LRAD and supervision with stable vitals to improve safety with household distances and reduce fall risk. PROGRESSING 4. Improve LE strength grades by 1 to increase ease of functional mobility with transfers and gait. MET  5. Pt will demonstrate improved balance by one grade in order to decrease risk of falls. MET  6. Tolerate 3 hours OOB to promote improved activity tolerance. MET 7. Pt will perform 5x STS in 12.6 seconds to decrease risk of falls. PT Treatment Day 10   Plan   Treatment/Interventions Functional transfer training;LE strengthening/ROM; Elevations; Therapeutic exercise;Cognitive reorientation;Patient/family training;Equipment eval/education; Bed mobility;Gait training;Spoke to nursing;Spoke to case management;OT   PT Frequency 2-3x/wk   Discharge Recommendation   Rehab Resource Intensity Level, PT II (Moderate Resource Intensity)   AM-PAC Basic Mobility Inpatient   Turning in Flat Bed Without Bedrails 3   Lying on Back to Sitting on Edge of Flat Bed Without Bedrails 2   Moving Bed to Chair 3   Standing Up From Chair Using Arms 3   Walk in Room 3   Climb 3-5 Stairs With Railing 2   Basic Mobility Inpatient Raw Score 16   Basic Mobility Standardized Score 38.32   Highest Level Of Mobility   JH-HLM Goal 5: Stand one or more mins   JH-HLM Achieved 5: Stand (1 or more minutes)   Exercises   Balance training  standing HR 2x10   End of Consult   Patient Position at End of Consult Supine;Bed/Chair alarm activated; All needs within reach   The patient's AM-PAC Basic Mobility Inpatient Short Form Raw Score is 16. A Raw score of less than or equal to 16 suggests the patient may benefit from discharge to post-acute rehabilitation services.  Please also refer to the recommendation of the Physical Therapist for safe discharge planning.     Deonte Johnson, PT

## 2023-11-27 NOTE — PROGRESS NOTES
Medical Oncology/Hematology Follow Up Note  Daya Harrison, female, 77 y. o., 1955,  ICU 03/ICU 03, [id number]             Patient seen earlier this morning but unable to document until now. Assessment and Plan  1. 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 She will receive chemotherapy tomorrow as well. Tolerating treatment well without signs of lysis. Continue to monitor blood counts daily. Continue supportive care with transfusion for hemoglobin less than 7 and platelets less than 02,870. Will receive rituximab on day 6 on 11/29/2023. We will start Granix infusion for support on 11/30/2023 in anticipation of neutropenia secondary to treatment which occur 7 to 10 days after treatment and will continue until white blood cell counts and neutrophils increased. 2. PO  Resolved. Most likely in the setting of diuresis -as occurred after receiving Lasix at the end of last week. Continue to monitor. Chemotherapy at a dose adjusted based on creatinine prior to starting cycle 4 of R-EPOCH. 3. Anemia  Most likely multifactorial as normocytic. Did have some folate deficiency. Likely due to ongoing medical issues in the setting of being hospitalized and receiving chemotherapy as well. Continue supportive care. We will transfuse for hemoglobin less than 7 and platelets less than 01,142. We will continue to monitor closely but please call with issues or concerns. Subjective:  Doing well sitting up in bed this morning. Talking. In a good mood. Denies any issues or concerns with treatment. Denies any side effects. On ventilator. Review of Systems:   Review of Systems   Constitutional:  Positive for activity change (confined to hospital). Negative for chills and fever. HENT:  Positive for voice change. Negative for ear pain, mouth sores, sore throat and trouble swallowing. Eyes:  Negative for pain and visual disturbance.    Respiratory: Negative for cough and shortness of breath. Trach and ventilator   Cardiovascular:  Negative for chest pain and palpitations. Gastrointestinal:  Negative for abdominal pain and vomiting. Genitourinary:  Negative for dysuria and hematuria. Musculoskeletal:  Negative for arthralgias and back pain. Skin:  Negative for color change and rash. Neurological:  Negative for dizziness, seizures, syncope, numbness and headaches. All other systems reviewed and are negative.         Current Facility-Administered Medications:     acyclovir (ZOVIRAX) capsule 400 mg, 400 mg, Oral, BID, Jes Gordon MD, 400 mg at 11/27/23 1712    allopurinol (ZYLOPRIM) tablet 200 mg, 200 mg, Oral, 4x Daily, Kenny Grover MD, 200 mg at 11/27/23 1712    alteplase (CATHFLO) injection 2 mg, 2 mg, Intracatheter, Q1MIN PRN, RANDEE Silverman, 2 mg at 11/17/23 1435    alteplase (CATHFLO) injection 2 mg, 2 mg, Intracatheter, Q1MIN PRN, RANDEE Silverman    alteplase (CATHFLO) injection 2 mg, 2 mg, Intracatheter, Q1MIN PRN, Jes Gordon MD    alteplase (CATHFLO) injection 2 mg, 2 mg, Intracatheter, Q1MIN PRN, Jes Gordon MD    amLODIPine (NORVASC) tablet 10 mg, 10 mg, Oral, Daily, RANDEE Chavarria, 10 mg at 11/27/23 0803    apixaban (ELIQUIS) tablet 5 mg, 5 mg, Oral, BID, Enoch Weiss MD, 5 mg at 11/27/23 0803    budesonide (PULMICORT) inhalation solution 0.5 mg, 0.5 mg, Nebulization, Q12H, RANDEE Chavarria, 0.5 mg at 11/27/23 0919    busPIRone (BUSPAR) tablet 30 mg, 30 mg, Oral, TID, RANDEE Chavarria, 30 mg at 11/27/23 1712    chlorhexidine (PERIDEX) 0.12 % oral rinse 15 mL, 15 mL, Mouth/Throat, Q12H [facility], RANDEE Chavarria, 15 mL at 11/27/23 0803    [START ON 11/28/2023] cyclophosphamide (CYTOXAN) 1,350 mg in sodium chloride 0.9 % 250 mL IVPB, 750 mg/m2 (Treatment Plan Recorded), Intravenous, Once, Jes Gordon MD    DOXOrubicin (ADRIAMYCIN) 18 mg, etoposide (TOPOSAR) 67.6 mg, vinCRIStine (ONCOVIN) 0.7 mg in sodium chloride 0.9 % 500 mL infusion, , Intravenous, Q24H, Lopez Isidro MD, Last Rate: 23 mL/hr at 11/27/23 0937, New Bag at 11/27/23 0937    famotidine (PEPCID) tablet 20 mg, 20 mg, Oral, BID, RANDEE Chavarria, 20 mg at 11/27/23 1711    fluconazole (DIFLUCAN) tablet 200 mg, 200 mg, Oral, Daily, Lopez Isidro MD, 200 mg at 50/44/92 1812    folic acid (FOLVITE) tablet 1 mg, 1 mg, Oral, Daily, RANDEE Chavarria, 1 mg at 11/27/23 0803    formoterol (PERFOROMIST) nebulizer solution 20 mcg, 20 mcg, Nebulization, Q12H, RANDEE Chavarria, 20 mcg at 11/27/23 0919    gabapentin (NEURONTIN) capsule 300 mg, 300 mg, Oral, TID, RANDEE Chavarria, 300 mg at 11/27/23 1711    levalbuterol (XOPENEX) inhalation solution 1.25 mg, 1.25 mg, Nebulization, Q6H, 1.25 mg at 11/27/23 0919 **AND** ipratropium (ATROVENT) 0.02 % inhalation solution 0.5 mg, 0.5 mg, Nebulization, Q6H, RANDEE Chavarria, 0.5 mg at 11/27/23 0919    levofloxacin (LEVAQUIN) tablet 250 mg, 250 mg, Oral, Q24H [address], Lopez Isidro MD, 250 mg at 11/27/23 0803    melatonin tablet 3 mg, 3 mg, Oral, HS, RANDEE Chavarria, 3 mg at 11/26/23 2157    metoprolol tartrate (LOPRESSOR) tablet 25 mg, 25 mg, Oral, Q12H [address], RANDEE Chavarria, 25 mg at 11/27/23 0803    [COMPLETED] nicotine (NICODERM CQ) 14 mg/24hr TD 24 hr patch 14 mg, 14 mg, Transdermal, Daily, 14 mg at 10/15/23 1002 **FOLLOWED BY** nicotine (NICODERM CQ) 7 mg/24hr TD 24 hr patch 7 mg, 7 mg, Transdermal, Daily, RANDEE Chavarria, 7 mg at 11/27/23 0804    ondansetron (ZOFRAN) 16 mg, dexamethasone (DECADRON) 10 mg in sodium chloride 0.9 % 50 mL IVPB, , Intravenous, Q24H, Lopez Isidro MD, Last Rate: 150 mL/hr at 11/27/23 0821, New Bag at 11/27/23 0821    ondansetron (ZOFRAN) injection 4 mg, 4 mg, Intravenous, Q8H PRN, RANDEE Chavarria, 4 mg at 11/22/23 2009    ondansetron (ZOFRAN) injection 4 mg, 4 mg, Intravenous, Q8H PRN, Susy Barboza MD    oxyCODONE (ROXICODONE) oral solution 2.5 mg, 2.5 mg, Oral, Q6H PRN, RANDEE Chavarria, 2.5 mg at 11/20/23 0931    oxyCODONE (ROXICODONE) oral solution 5 mg, 5 mg, Oral, Q8H, RANDEE Chavarria, 5 mg at 11/27/23 1236    polyethylene glycol (MIRALAX) packet 17 g, 17 g, Oral, Daily, Noemy Mcpherson PA-C    predniSONE tablet 100 mg, 60 mg/m2 (Treatment Plan Recorded), Oral, BID With Meals, Susy Barboza MD, 100 mg at 11/27/23 1712    QUEtiapine (SEROquel) tablet 50 mg, 50 mg, Oral, HS, RANDEE Chavarria, 50 mg at 11/26/23 2156    senna oral syrup 17.6 mg, 17.6 mg, Oral, BID, Jarred Erwin PA-C, 17.6 mg at 11/24/23 1704    sertraline (ZOLOFT) tablet 75 mg, 75 mg, Per PEG Tube, Daily, RANDEE Chavarria, 75 mg at 11/27/23 0803    [START ON 11/28/2023] sodium chloride 0.9 % bolus 2,000 mL, 2,000 mL, Intravenous, Once, Susy Barboza MD    sodium chloride 0.9 % infusion, 20 mL/hr, Intravenous, Daily PRN, Susy Barboza MD, Last Rate: 20 mL/hr at 11/26/23 0903, 20 mL/hr at 11/26/23 0903    sulfamethoxazole-trimethoprim (BACTRIM DS) 800-160 mg per tablet 1 tablet, 1 tablet, Oral, Once per day on Mon Wed Fri, Susy Barboza MD, 1 tablet at 11/27/23 0803    Medications Prior to Admission   Medication    acetaminophen (TYLENOL) 650 mg CR tablet    albuterol (PROVENTIL HFA,VENTOLIN HFA) 90 mcg/act inhaler    amLODIPine (NORVASC) 10 mg tablet    ASPIRIN-ACETAMINOPHEN-CAFFEINE PO    Calcium Carb-Cholecalciferol (OSCAL-D) 500 mg-200 units per tablet    carvedilol (COREG) 25 mg tablet    diclofenac sodium (VOLTAREN) 1 %    EPINEPHrine (EPIPEN) 0.3 mg/0.3 mL SOAJ    famotidine (PEPCID) 20 mg tablet    fluticasone (FLONASE) 50 mcg/act nasal spray    fluticasone-umeclidinium-vilanterol (Trelegy Ellipta) 100-62.5-25 mcg/actuation inhaler    gabapentin (NEURONTIN) 600 MG tablet    loratadine (CLARITIN) 10 mg tablet    miconazole (MONISTAT-7) 2 % vaginal cream    naloxone (NARCAN) 4 mg/0.1 mL nasal spray    nicotine (NICODERM CQ) 7 mg/24hr TD 24 hr patch    NON FORMULARY    oxyCODONE (OXY-IR) 5 MG capsule    sertraline (ZOLOFT) 50 mg tablet       Allergies   Allergen Reactions    Methyldopa Shortness Of Breath and Other (See Comments)     Aldomet         Physical Exam:    /74 (BP Location: Right arm)   Pulse 95   Temp 98.6 °F (37 °C) (Oral)   Resp 16   Ht 5' (1.524 m)   Wt 75.8 kg (167 lb 1.7 oz)   SpO2 97%   BMI 32.64 kg/m²     Physical Exam  Constitutional:       General: She is not in acute distress. Appearance: Normal appearance. She is not ill-appearing. HENT:      Head: Normocephalic and atraumatic. Right Ear: External ear normal.      Left Ear: External ear normal.      Nose: Nose normal.      Mouth/Throat:      Mouth: Mucous membranes are moist.      Comments: + trach and vemtilator  Neck:      Comments: + trach and ventilator  Pulmonary:      Effort: Pulmonary effort is normal. No respiratory distress. Comments: Trach in place, with ventilator support  Abdominal:      General: There is no distension. Musculoskeletal:      Right lower leg: No edema. Left lower leg: No edema. Skin:     Coloration: Skin is not jaundiced. Findings: No lesion. Neurological:      General: No focal deficit present. Mental Status: She is alert and oriented to person, place, and time. Cranial Nerves: No cranial nerve deficit. Psychiatric:         Mood and Affect: Mood normal.         Behavior: Behavior normal.         Thought Content:  Thought content normal.         Judgment: Judgment normal.         Recent Results (from the past 48 hour(s))   Comprehensive metabolic panel    Collection Time: 11/25/23  7:38 PM   Result Value Ref Range    Sodium 143 135 - 147 mmol/L    Potassium 5.5 (H) 3.5 - 5.3 mmol/L    Chloride 104 96 - 108 mmol/L    CO2 28 21 - 32 mmol/L    ANION GAP 11 mmol/L    BUN 30 (H) 5 - 25 mg/dL    Creatinine 1.52 (H) 0.60 - 1.30 mg/dL    Glucose 165 (H) 65 - 140 mg/dL    Calcium 10.0 8.4 - 10.2 mg/dL    AST 17 13 - 39 U/L    ALT 10 7 - 52 U/L    Alkaline Phosphatase 78 34 - 104 U/L    Total Protein 7.3 6.4 - 8.4 g/dL    Albumin 3.5 3.5 - 5.0 g/dL    Total Bilirubin 0.32 0.20 - 1.00 mg/dL    eGFR 34 ml/min/1.73sq m   Potassium    Collection Time: 11/26/23 12:33 AM   Result Value Ref Range    Potassium 4.3 3.5 - 5.3 mmol/L   CBC and differential    Collection Time: 11/26/23  6:17 AM   Result Value Ref Range    WBC 7.70 4.31 - 10.16 Thousand/uL    RBC 3.14 (L) 3.81 - 5.12 Million/uL    Hemoglobin 9.3 (L) 11.5 - 15.4 g/dL    Hematocrit 29.8 (L) 34.8 - 46.1 %    MCV 95 82 - 98 fL    MCH 29.6 26.8 - 34.3 pg    MCHC 31.2 (L) 31.4 - 37.4 g/dL    RDW 16.6 (H) 11.6 - 15.1 %    MPV 9.1 8.9 - 12.7 fL    Platelets 134 579 - 049 Thousands/uL   LD,Blood    Collection Time: 11/26/23  6:17 AM   Result Value Ref Range     140 - 271 U/L   Glucose 6 phosphate dehydrogenase    Collection Time: 11/26/23  6:17 AM   Result Value Ref Range    G6PD, Quant 316 127 - 427 U/10E12 RBC    Red Blood Cell Count 3.25 (L) 3.77 - 5.28 x10E6/uL   Manual Differential(PHLEBS Do Not Order)    Collection Time: 11/26/23  6:17 AM   Result Value Ref Range    Segmented % 88 (H) 43 - 75 %    Bands % 2 0 - 8 %    Lymphocytes % 6 (L) 14 - 44 %    Monocytes % 4 4 - 12 %    Eosinophils, % 0 0 - 6 %    Basophils % 0 0 - 1 %    Absolute Neutrophils 6.93 1.85 - 7.62 Thousand/uL    Lymphocytes Absolute 0.46 (L) 0.60 - 4.47 Thousand/uL    Monocytes Absolute 0.31 0.00 - 1.22 Thousand/uL    Eosinophils Absolute 0.00 0.00 - 0.40 Thousand/uL    Basophils Absolute 0.00 0.00 - 0.10 Thousand/uL    Total Counted      RBC Morphology Present     Platelet Estimate Adequate Adequate    Anisocytosis Present    POCT Blood Gas (CG8+)    Collection Time: 11/26/23  6:39 AM   Result Value Ref Range    pH, Art i-STAT 7.421 7.350 - 7.450    pCO2, Art i-STAT 49.0 (H) 36.0 - 44.0 mm HG    pO2, ART i-STAT 192.0 (H) 75.0 - 129.0 mm HG    BE, i-STAT 6 (H) -2 - 3 mmol/L    HCO3, Art i-STAT 31.9 (H) 22.0 - 28.0 mmol/L    CO2, i-STAT 33 (H) 21 - 32 mmol/L    O2 Sat, i-STAT 100 (H) 60 - 85 %    SODIUM, I-STAT 140 136 - 145 mmol/l    Potassium, i-STAT 4.0 3.5 - 5.3 mmol/L    Calcium, Ionized i-STAT 1.31 1.12 - 1.32 mmol/L    Hct, i-STAT 31 (L) 34.8 - 46.1 %    Hgb, i-STAT 10.5 (L) 11.5 - 15.4 g/dl    Glucose, i-STAT 138 65 - 140 mg/dl    POC FIO2 40 L    Specimen Type ARTERIAL     Delivery System Tach Collar    Comprehensive metabolic panel    Collection Time: 11/26/23  7:54 AM   Result Value Ref Range    Sodium 143 135 - 147 mmol/L    Potassium 4.0 3.5 - 5.3 mmol/L    Chloride 104 96 - 108 mmol/L    CO2 31 21 - 32 mmol/L    ANION GAP 8 mmol/L    BUN 28 (H) 5 - 25 mg/dL    Creatinine 1.33 (H) 0.60 - 1.30 mg/dL    Glucose 136 65 - 140 mg/dL    Calcium 9.6 8.4 - 10.2 mg/dL    Corrected Calcium 10.3 (H) 8.3 - 10.1 mg/dL    AST 10 (L) 13 - 39 U/L    ALT 8 7 - 52 U/L    Alkaline Phosphatase 64 34 - 104 U/L    Total Protein 6.1 (L) 6.4 - 8.4 g/dL    Albumin 3.1 (L) 3.5 - 5.0 g/dL    Total Bilirubin 0.24 0.20 - 1.00 mg/dL    eGFR 40 ml/min/1.73sq m   Comprehensive metabolic panel    Collection Time: 11/26/23  9:12 AM   Result Value Ref Range    Sodium 142 135 - 147 mmol/L    Potassium 3.9 3.5 - 5.3 mmol/L    Chloride 102 96 - 108 mmol/L    CO2 32 21 - 32 mmol/L    ANION GAP 8 mmol/L    BUN 27 (H) 5 - 25 mg/dL    Creatinine 1.27 0.60 - 1.30 mg/dL    Glucose 145 (H) 65 - 140 mg/dL    Calcium 9.8 8.4 - 10.2 mg/dL    Corrected Calcium 10.4 (H) 8.3 - 10.1 mg/dL    AST 11 (L) 13 - 39 U/L    ALT 9 7 - 52 U/L    Alkaline Phosphatase 64 34 - 104 U/L    Total Protein 6.3 (L) 6.4 - 8.4 g/dL    Albumin 3.2 (L) 3.5 - 5.0 g/dL    Total Bilirubin 0.24 0.20 - 1.00 mg/dL    eGFR 42 ml/min/1.73sq m   Comprehensive metabolic panel    Collection Time: 11/26/23  8:35 PM   Result Value Ref Range    Sodium 143 135 - 147 mmol/L    Potassium 4.1 3.5 - 5.3 mmol/L    Chloride 102 96 - 108 mmol/L    CO2 32 21 - 32 mmol/L    ANION GAP 9 mmol/L    BUN 28 (H) 5 - 25 mg/dL    Creatinine 1.38 (H) 0.60 - 1.30 mg/dL    Glucose 201 (H) 65 - 140 mg/dL    Calcium 9.7 8.4 - 10.2 mg/dL    Corrected Calcium 10.2 (H) 8.3 - 10.1 mg/dL    AST 16 13 - 39 U/L    ALT 13 7 - 52 U/L    Alkaline Phosphatase 69 34 - 104 U/L    Total Protein 6.5 6.4 - 8.4 g/dL    Albumin 3.4 (L) 3.5 - 5.0 g/dL    Total Bilirubin 0.26 0.20 - 1.00 mg/dL    eGFR 38 ml/min/1.73sq m   CBC and differential    Collection Time: 11/27/23  6:40 AM   Result Value Ref Range    WBC 4.82 4.31 - 10.16 Thousand/uL    RBC 3.44 (L) 3.81 - 5.12 Million/uL    Hemoglobin 10.2 (L) 11.5 - 15.4 g/dL    Hematocrit 32.2 (L) 34.8 - 46.1 %    MCV 94 82 - 98 fL    MCH 29.7 26.8 - 34.3 pg    MCHC 31.7 31.4 - 37.4 g/dL    RDW 15.9 (H) 11.6 - 15.1 %    MPV 9.1 8.9 - 12.7 fL    Platelets 807 266 - 695 Thousands/uL    nRBC 0 /100 WBCs    Neutrophils Relative 94 (H) 43 - 75 %    Immat GRANS % 1 0 - 2 %    Lymphocytes Relative 3 (L) 14 - 44 %    Monocytes Relative 2 (L) 4 - 12 %    Eosinophils Relative 0 0 - 6 %    Basophils Relative 0 0 - 1 %    Neutrophils Absolute 4.53 1.85 - 7.62 Thousands/µL    Immature Grans Absolute 0.05 0.00 - 0.20 Thousand/uL    Lymphocytes Absolute 0.12 (L) 0.60 - 4.47 Thousands/µL    Monocytes Absolute 0.11 (L) 0.17 - 1.22 Thousand/µL    Eosinophils Absolute 0.00 0.00 - 0.61 Thousand/µL    Basophils Absolute 0.01 0.00 - 0.10 Thousands/µL   LD,Blood    Collection Time: 11/27/23  6:40 AM   Result Value Ref Range     140 - 271 U/L   Comprehensive metabolic panel    Collection Time: 11/27/23  6:40 AM   Result Value Ref Range    Sodium 143 135 - 147 mmol/L    Potassium 4.0 3.5 - 5.3 mmol/L    Chloride 106 96 - 108 mmol/L    CO2 30 21 - 32 mmol/L    ANION GAP 7 mmol/L    BUN 31 (H) 5 - 25 mg/dL    Creatinine 1.15 0.60 - 1.30 mg/dL    Glucose 138 65 - 140 mg/dL    Calcium 9.7 8.4 - 10.2 mg/dL    Corrected Calcium 10.3 (H) 8.3 - 10.1 mg/dL    AST 16 13 - 39 U/L    ALT 14 7 - 52 U/L    Alkaline Phosphatase 61 34 - 104 U/L    Total Protein 6.5 6.4 - 8.4 g/dL    Albumin 3.3 (L) 3.5 - 5.0 g/dL    Total Bilirubin 0.33 0.20 - 1.00 mg/dL    eGFR 48 ml/min/1.73sq m       XR chest 1 view portable    Result Date: 11/25/2023  Narrative: CHEST INDICATION:   shortness of breath with desaturation. . COMPARISON: CXR and chest CT 11/22/2023. EXAM PERFORMED/VIEWS:  XR CHEST PORTABLE. FINDINGS: Tracheostomy. Right port in lower SVC. Mild cardiomegaly. Moderate pulmonary edema and emphysema. No definite effusion. No pneumothorax. Upper abdomen normal. Bones normal for age. Impression: Moderate pulmonary edema. Pneumonia not excluded in the appropriate clinical setting. Workstation performed: [id number]     CT chest wo contrast    Result Date: 11/22/2023  Narrative: CT CHEST WITHOUT IV CONTRAST INDICATION:   Dyspnea, chronic, chest wall or pleura disease suspected shortness of breath. COMPARISON: Compared with 11/3/2023 TECHNIQUE: CT examination of the chest was performed without intravenous contrast. Multiplanar 2D reformatted images were created from the source data. This examination, like all CT scans performed in the [place], was performed utilizing techniques to minimize radiation dose exposure, including the use of iterative reconstruction and automated exposure control. Radiation dose length product (DLP) for this visit:  318 mGy-cm FINDINGS: LUNGS: Moderate to severe emphysematous changes. Patchy parenchymal opacity in the lingula and left lower lobe posteriorly. Decreased left lung volume. There is no tracheal or endobronchial lesion. Tracheotomy tube seen. PLEURA:  Unremarkable. HEART/GREAT VESSELS: Heart is unremarkable for patient's age. No thoracic aortic aneurysm. Dilated main pulmonary artery measuring 4.3 cm. MEDIASTINUM AND PRASANTH: Subcentimeter right paratracheal and precarinal lymph nodes.  CHEST WALL AND LOWER NECK:  Unremarkable. VISUALIZED STRUCTURES IN THE UPPER ABDOMEN: Gastrostomy tube in place with contrast opacified balloon in the lumen. Simple right renal cyst. OSSEOUS STRUCTURES: Lytic area in the T12 vertebral body along the inferior endplate is unchanged. Lucent focus in the left glenoid unchanged. Impression: Breathing motion artifacts and emphysematous changes limit evaluation. Infiltrates in the lingula and left lower lobe suggested. Improved from prior study. Workstation performed: [id number]     XR chest portable ICU    Result Date: 11/22/2023  Narrative: CHEST INDICATION:   hypoxia. COMPARISON: Chest x-ray November 20, 2023 EXAM PERFORMED/VIEWS:  XR CHEST PORTABLE ICU FINDINGS: Tracheostomy tube is in satisfactory position. Right central venous catheter terminates in the superior vena cava. Cardiomegaly is demonstrated. Stable interstitial opacities are seen which could reflect edema or interstitial infiltrate with patchy opacities in the lower lobes, worse on the left. Bony structures are unchanged     Impression: Stable exam. Workstation performed: [id number]     XR chest portable ICU    Result Date: 11/21/2023  Narrative: CHEST INDICATION:  Status post trach and vent at night, SOB. COMPARISON: 11/10/2023, CT chest 11/3/2023 EXAM PERFORMED/VIEWS:  XR CHEST PORTABLE ICU FINDINGS: -Tracheostomy tube projects in satisfactory radiographic position. -Right IJ central line tip terminates in the region of the SVC. Cardiomediastinal silhouette appears stable. Diffuse interstitial and groundglass opacities throughout the lungs, unchanged. No pneumothorax. Osseous structures appear within normal limits for patient age. Impression: Diffuse interstitial and groundglass opacities throughout the lungs, unchanged. Findings could be infectious/inflammatory and/or secondary to edema.  Workstation performed: [id number]     Echo follow up/limited w/ contrast if indicated    Result Date: 11/21/2023  Narrative:   Left Ventricle: Left ventricular cavity size is normal. Wall thickness is severely increased. The left ventricular ejection fraction is 65%. Systolic function is normal. Wall motion is normal. Diastolic function is abnormal.  Left atrial filling pressure is elevated. Right Ventricle: Right ventricular cavity size is normal. Systolic function is normal.   Atrial Septum: No patent foramen ovale detected, confirmed at rest and with provcation using agitated saline contrast.   Mitral Valve: There is moderate regurgitation. There is systolic anterior motion of the anterior leaflet without late peaking gradient at rest.   Pulmonic Valve: There is mild regurgitation. CT soft tissue neck w contrast    Result Date: 11/15/2023  Narrative: CT NECK WITH CONTRAST INDICATION:   Head/neck cancer, assess treatment response Assess response to chemo in terms of size of malignant neck mass. History of B-cell lymphoma. COMPARISON: 10/13/2023 TECHNIQUE:  Axial, sagittal, and coronal 2D reformatted images were created from the axial source data and submitted for interpretation. Radiation dose length product (DLP) for this visit:  524 mGy-cm . This examination, like all CT scans performed in the [facility], was performed utilizing techniques to minimize radiation dose exposure, including the use of iterative reconstruction and automated exposure control. IV Contrast:  85 mL of iohexol (OMNIPAQUE) IMAGE QUALITY:  Diagnostic. FINDINGS: VISUALIZED BRAIN PARENCHYMA:  No acute intracranial pathology of the visualized brain parenchyma. VISUALIZED ORBITS: Normal visualized orbits. PARANASAL SINUSES: There are retention cyst versus polyps within the sphenoid sinuses. Small amount of fluid in the left mastoid tip. NASAL CAVITY AND NASOPHARYNX: There has been interval decrease in size of the mass centered within the left nasopharynx.  There is persistent but decreased effacement of the left fossa of Rosenmuller as well as eustachian tube orifice. Disease extends inferiorly along the left lateral oropharyngeal wall as well as along the left parapharyngeal space. For reference the ill-defined mass measures approximately 2.5 x 3.0 cm on series 2, image 22 and previously measured approximately 3.6 x 3.5 cm when measured at the same level on the prior exam. There is improved extension of disease into the left infratemporal fossa and  space as well as prevertebral space. Persistent fat planes along the expected course of V3 as it exits foramen ovale. No  definite asymmetric widening of foramen ovale or regional erosive change. Soft tissue extends posterolaterally with persistent encasement of the left internal carotid artery however overall improved. SUPRAHYOID NECK: Please see above. INFRAHYOID NECK:  Aryepiglottic folds and piriform sinuses are normal.  Normal glottis and subglottic airway. THYROID GLAND:  Unremarkable. PAROTID AND SUBMANDIBULAR GLANDS:  Normal. LYMPH NODES: Please see above. No new adenopathy is identified. VASCULAR STRUCTURES: Partially imaged right-sided Mediport. There is thrombus noted within the right internal jugular vein just above the Mediport entrance site into the internal jugular vein. THORACIC INLET: Status post tracheostomy. Emphysema. Improved previously seen consolidation and layering effusions. BONY STRUCTURES: No acute fracture or destructive osseous lesion. Impression: Improved ill-defined mass centered along the left nasopharynx and oropharynx with regional extension of disease as described above. Findings consistent with the clinical history of lymphoma. No new suspicious adenopathy identified. Status post right-sided Mediport placement. Nonocclusive thrombus is seen within the right internal jugular vein just above the entrance site of the Mediport catheter into the internal jugular vein.  Improved previously seen consolidation and effusion in the visualized lung fields. The study was marked in [facility] for immediate notification. Workstation performed: [id number]     FL barium swallow video w speech    Result Date: 11/14/2023  Narrative: A video barium swallow study was performed by the Department of Speech Pathology. Please refer to the report for the official interpretation. The images are stored for archival purposes only. Study images were not formally reviewed by the Radiology Department. XR chest portable ICU    Result Date: 11/11/2023  Narrative: CHEST INDICATION:   Desatruation and hypoxia, evaluation for intratrhoacic pathology. COMPARISON: 11/2/2023 EXAM PERFORMED/VIEWS:  XR CHEST PORTABLE ICU FINDINGS: Tracheostomy tube tip terminates approximately 1 cm from sujata. Right  sided central venous catheter tip near cavoatrial junction. Persistent dense  left  lung base opacity may represent a combination of effusion and parenchymal opacity. Improved diffuse bilateral hazy/groundglass opacity. No pneumothorax. Stable cardiomediastinal silhouette. No large right effusion. Impression: Persistent dense  left  lung base opacity may represent a combination of effusion and parenchymal opacity. Improved diffuse bilateral hazy/groundglass opacity. Workstation performed: [id number]     Bronchoscopy    Result Date: 11/6/2023  Narrative: Table formatting from the original result was not included. 300 [facility] [address] [phone] DATE OF SERVICE: 11/06/23 PHYSICIAN(S): Attending: Caroline Pichardo MD Fellow: Daxa Barrios MD INDICATION: Acute respiratory failure with hypoxia ([address]) POST-OP DIAGNOSIS: See the impression below. PREPROCEDURE: Standard airway preparation completed per respiratory therapy protocol. Informed consent was obtained. Images reviewed prior to the procedure. A Time Out was performed.  No suspicion or identified risk for TB or other airborne infectious disease; bronchoscopy procedure being performed for diagnostic purposes. PROCEDURE: Bronchoscopy DETAILS OF PROCEDURE: Procedure was performed at bedside in the intensive care unit. A time out was performed to confirm correct patient and correct procedure. The patient underwent moderate sedation, which was administered by an intensivist. The patient's blood pressure, ECG, heart rate, oxygen and respirations were monitored throughout the procedure. The patient experienced no blood loss. The scope was introduced through the tracheostomy tube. The procedure was not difficult. The patient tolerated the procedure well. There were no apparent adverse events. ANESTHESIA INFORMATION: ASA: ASA status not filed in the log. Anesthesia Type: Anesthesia type not filed in the log.  FINDINGS: The upper trachea, middle trachea, lower trachea, main sujata, left main stem, left apical-posterior segment (LB1+2), left upper anterior segment (LB3), superior lingular segment (LB4), inferior lingular segment (LB5), left superior segment (LB6), left anterior basal segment (LB7+8), left lateral basal segment (LB9), left posterior basal segment (LB10), right main stem, right apical segment (RB1), right posterior segment (RB2), right anterior segment (RB3), bronchus intermedius, right lateral segment (RB4), right medial segment (RB5), right anterior basal segment (RB8), right lateral basal segment (RB9) and right posterior basal segment (RB10) appeared normal. Bronchoalveolar lavage was performed x3 in the right medial segment (RB5) with 180 mL of saline instilled and a total return of 60 mL; the fluid appeared cloudy Minimal, thin and clear secretions present in the left apical-posterior segment (LB1+2), left upper anterior segment (LB3), superior lingular segment (LB4), inferior lingular segment (LB5), left superior segment (LB6), left anterior basal segment (LB7+8), left lateral basal segment (LB9), left posterior basal segment (LB10), right lateral segment (RB4), right medial segment (RB5), right superior segment (RB6), right medial basal segment (RB7), right anterior basal segment (RB8), right lateral basal segment (RB9) and right posterior basal segment (RB10); secretions were easily removed by therapeutic aspiration and the airway was cleared; performed washing, sample sent for cytology and microbiology analysis Performed 1 brushing with cytology and microbiology brushes in the right superior segment (RB6) SPECIMENS: ID Type Source Tests Collected by Time Destination 1 :  Washing Lung, Right Washing PULMONARY CYTOLOGY Caitie Carter MD 11/6/2023 1423  2 :  Lavage Lung, Right Middle Lobe Bronchoalveolar Lavage PULMONARY CYTOLOGY Caitie Carter MD 11/6/2023 1415  A :  Bronchial Lung, Right Middle Lobe Bronchoalveolar Lavage FUNGAL CULTURE, VIRUS CULTURE, BRONCHIAL CULTURE AND GRAM STAIN, LEGIONELLA CULTURE, PNEUMOCYSTIS SMEAR BY DFA ([facility]), AFB CULTURE WITH STAIN Caitie Carter MD 11/6/2023 1411  B :  Bronchial Lung, Right Lower Lobe Bronchial Washing FUNGAL CULTURE, VIRUS CULTURE, BRONCHIAL CULTURE AND GRAM STAIN, LEGIONELLA CULTURE, PNEUMOCYSTIS SMEAR BY DFA ([facility]), AFB CULTURE WITH STAIN Caitie Carter MD 11/6/2023 1421       Impression: Thick secretions in the inner canula of the tracheostomy. This was replaced for a new inner canula. Minimal amount of clear, thin secretions in bilateral airways. No evidence of mucous plug. No evidence of endobronchial lesions or obstructions. BAL of RML performed as well as brushing of RB-6. RECOMMENDATION: Follow-up all cultures and cytology from wash and from BAL Follow up culture results from [facility] Continue management in the intensive care unit. Can resume tube feeds and PO diet once awake. Roxanne Liu MD Pulmonary and Critical Care Fellow, PGY-5 [facility] I personally supervised, assisted in, and/or performed the entirety of this procedure.  Jj Smith MD     CT chest wo contrast    Result Date: 11/4/2023  Narrative: CT CHEST WITHOUT IV CONTRAST INDICATION:   hypoxia. Acute hypoxic respiratory failure. Recently diagnosed large B-cell lymphoma. COMPARISON: 10/12/2023 TECHNIQUE: CT examination of the chest was performed without intravenous contrast. Multiplanar 2D reformatted images were created from the source data. This examination, like all CT scans performed in the [facility], was performed utilizing techniques to minimize radiation dose exposure, including the use of iterative reconstruction and automated exposure control. Radiation dose length product (DLP) for this visit:  421 mGy-cm FINDINGS: LUNGS: Pulmonary emphysema noted, with superimposed diffuse alveolar and interstitial airspace opacities. Compared to the most recent examination, little change has occurred in the upper lung fields. There is partially diminished atelectasis at the lung bases, possibly related to the diminished size of the effusions. When the lung disease is compared to earlier studies such as 9/13/2023, the process remains significantly worsened PLEURA: Trace right effusion, but overall diminished size of the pleural effusions. No pneumothorax. HEART/GREAT VESSELS: No pericardial effusion. Cardiomegaly. Significant enlargement of the main pulmonary artery, diameter 4.7 cm earlier 3.9 cm. Central line tip is seen in the upper right atrium. MEDIASTINUM AND PRASANTH: No progressive adenopathy. Tracheotomy tube positioning appropriate. CHEST WALL AND LOWER NECK:  Unremarkable. VISUALIZED STRUCTURES IN THE UPPER ABDOMEN: Partial visualization of a incompletely assessed hypodense right kidney upper pole nodule OSSEOUS STRUCTURES: Chronic but enlarged (since 2020) lucent defect in the T12 vertebral body with pathologic fracture. Comparing to 8/25/2023, no change     Impression: 1. Persistent severe bilateral alveolar and interstitial opacities superimposed upon pulmonary emphysema.  For the most part, this is unchanged from the most recent prior study although there is somewhat less atelectasis at the lung bases perhaps related to diminished size of previously seen pleural effusions. When the airspace disease is compared to earlier examinations for example 9/13/2023, significant worsening is again noted 2. No pneumothorax 3. Markedly enlarged main pulmonary artery consistent with pulmonary arterial hypertension. Workstation performed: [id number]     XR chest portable ICU    Result Date: 11/3/2023  Narrative: CHEST INDICATION:   hypoxia. COMPARISON: 10/29/2023 EXAM PERFORMED/VIEWS:  XR CHEST PORTABLE ICU Images: 2 FINDINGS: Cardiomediastinal silhouette appears enlarged. Tracheostomy tube is midline. Right-sided central catheter unchanged. Diffuse abnormal interstitial and alveolar disease appears slightly worse. No pneumothorax. No pleural effusion. Osseous structures appear within normal limits for patient age. Impression: Diffuse abnormal interstitial and alveolar disease appears worse. Correlate for multifocal pneumonia, heart failure, ARDS. Workstation performed: [id number]     XR chest portable    Result Date: 10/30/2023  Narrative: CHEST INDICATION:   hypoxia. COMPARISON: 10/27/2023 EXAM PERFORMED/VIEWS:  XR CHEST PORTABLE FINDINGS: Right  sided central venous catheter tip near superior cavoatrial junction. Tracheostomy tube noted. Persistent dense  left  lung base opacity may represent a combination of effusion and parenchymal opacity. Persistent bilateral diffuse interstitial and hazy airspace/groundglass opacity. No pneumothorax. Stable cardiomediastinal silhouette. Impression: Persistent dense  left  lung base opacity may represent a combination of effusion and parenchymal opacity. Persistent bilateral diffuse interstitial and hazy airspace/groundglass opacity. Workstation performed: [id number]       Labs and pertinent reports reviewed. This note has been generated by voice recognition software system.   Therefore, there may be spelling, grammar, and or syntax errors. Please contact if questions arise.     Marii Woodruff MD, PhD

## 2023-11-27 NOTE — SPEECH THERAPY NOTE
Speech Language/Pathology    Speech/Language Pathology Progress Note    Patient Name: Onesimo Krishnamurthy  LBPXI'V Date: 11/27/2023       Subjective:  Pt seen for speech and swallow tx follow up. Pt sitting upright in bed eating lunch slowly. PMV not in place, but able to produce voicing around the trach. Cuff deflated. Pt on chemo. Dtr arrived during session. Objective:  Swallowing:  Pt took bites of cheeseburger, drank gingerale by straw. Mastication/manipulation were slow but appeared effective. Good oral control of liquids noted. Swallows timely and complete. No overt s/s aspiration noted. Speech:  pt able to produce voice around trach. Pt became somewhat gurgly during session, dtr stated to pt that she prob needs suctioning, attempted to have pt cough to expectorate mucous, but pt stated "there's too many ppl here now" (physician had come in to talk to dtr). After physician left, asked pt again to cough, but pt stated she "doesn't have time for this now and just wants to eat."  Pt stated she can don and doff the PMV w/ use of a mirror. Discussed finger occlusion to improve voicing when not wearing PMV. Pt again refused to trial finger occlusion at this time. Instructions reviewed w/ dtr, who stated understanding. Assessment:  Pt appears to be tolerating regular diet w/ thin liquids, stated she gets some TF late afternoon. PMV usage per pulm due to pt's episodes of hypoxia, instructed on finger occlusion, pt refused demonstration at this time, reviewed w/ dtr.      Plan/Recommendations:  No further speech or swallowing tx needed, will remain available as needed      Gretchen Boo CCC-SLP  Speech Pathologist  Available via  tiger text

## 2023-11-27 NOTE — PLAN OF CARE
Problem: PHYSICAL THERAPY ADULT  Goal: Performs mobility at highest level of function for planned discharge setting. See evaluation for individualized goals. Description: Treatment/Interventions: Functional transfer training, LE strengthening/ROM, Elevations, Therapeutic exercise, Endurance training, Patient/family training, Equipment eval/education, Bed mobility, Gait training, Spoke to nursing, Spoke to case management          See flowsheet documentation for full assessment, interventions and recommendations. Note: Prognosis: Fair  Problem List: Decreased strength, Decreased endurance, Impaired balance, Decreased mobility, Impaired vision, Impaired judgement, Obesity, Decreased skin integrity  Assessment: Pt seen for PT reevaluation given  goals. Pt continues to demonstrate fluctuating levels of progression, however this is secondary to medical status. Pt does not require significant physical assistance with functional mobility. However throughout her course of stay her overall progression has been limited due to her medical status, increased O2 demands. Pt does demonstrate improvement within her most recent visits. Requiring less physical assistance for STS transfers, increased ambulatory distances. Pt does continue to demonstrate deficits in LE strenght, endurance and increased fall risk given performs of 5s STS test this session. Pt would continue to benefit from skilled PT services to address functional deficits and return to improved level of function. Pt would continue to benefit on OOB mobility, progression of ambulatory distances, progression of LE strengthening as well as challenging overall activity tolerance. Continue to reccomend a level II disposition. Pt POC date updated this session as well as goals to reflect pt current level of function.   Barriers to Discharge: Inaccessible home environment, Decreased caregiver support (3 PAUL)     Rehab Resource Intensity Level, PT: II (Moderate Resource Intensity)    See flowsheet documentation for full assessment.

## 2023-11-27 NOTE — ASSESSMENT & PLAN NOTE
Malnutrition Findings:   Adult Malnutrition type: Chronic illness  Adult Degree of Malnutrition: Unspecified protein calorie malnutrition  Malnutrition Characteristics: Inadequate energy, Other (comment) (stress of illness)                  360 Statement: Protein calorie malnutrition r/t inadequate intake as evidenced by >7 day period of poor intakes and stress of infection; currently treated with regular diet and nutrition supplements    BMI Findings: Body mass index is 32.64 kg/m².

## 2023-11-27 NOTE — ASSESSMENT & PLAN NOTE
Recent Labs     11/26/23  0617 11/26/23  0639 11/27/23  0640   HGB 9.3* 10.5* 10.2*       '  Patient received 1 PRBC transfusion on 10/5 and 10/25. Her B/L hemoglobin is 12-14. No sites of bleeding, no black stools. Iron panel indicative of inflammatory anemia. Normal Vitamin B12 levels. Folate low at 5.5. Filgrastim 4 doses administered by heme/onc. Hb declined from 9.9 to 7.4, pt received 1 PRBC transfusion (11/24)    Plan:  Trend hemoglobin and transfuse for <7. As per heme/onc transfuse irradiated and leukoreduced blood products. Trend platelets  Folic acid 1mg daily supplementation  As per conversation with Heme/oncology attending, patient pancytopenia associated with chemotherapy is expected/predictable. No further anemia w/u required.   Low ANC management as per heme/onc

## 2023-11-27 NOTE — ASSESSMENT & PLAN NOTE
Lab Results   Component Value Date    EGFR 38 11/26/2023    EGFR 42 11/26/2023    EGFR 40 11/26/2023    CREATININE 1.38 (H) 11/26/2023    CREATININE 1.27 11/26/2023    CREATININE 1.33 (H) 11/26/2023     Baseline Cr between 0.75-1.1  Initial PO felt 2/2 prerenal azotemia 2/2 diuresis, reduced PO intake +/- ATN. Patient received 2 doses of Bumex IV 2 g as she had not been responding appropriately to IV 40 Lasix daily. Creatinine went up by 0.5 meeting criteria for an PO. Suspect most likely due to medications as a combination of prerenal and intrinsic. FENa was 0.2%, UA shows no casts or signs of infection, urine eosinophils 0%. Patient likely has prerenal PO from volume depletion. Received 2 L NS and 1 pRBC and cr went up by 0.07 still and Na and Cl went down, suspect that volume prevented further cr rise and free water excretion impaired by kidney function, allowing hyponatremia to increase. Suspect that now that nephrotoxic medications have been stopped she responded well to Lasix and creatinine downtrended. PO now resolved. Will be cautious about nephrotoxins and monitor as restarting EPOCH and ppx. Patient gets volume depleted and overloaded very easily. Especially from chemo. Plan:   Continue to monitor UO/renal function. Avoid nephrotoxic agents  Follow up with BMP in the morning   Continue to monitor a.m. BMP.

## 2023-11-27 NOTE — WOUND OSTOMY CARE
Progress Note - Wound   Wilma Arleen 77 y.o. female MRN: [id number]  Unit/Bed#: ICU 03 Encounter: [id number]        Assessment:   Patient seen today for wound care follow up assessment. Patient's pressure injury to neck from trach site is now resolved. Sacral/buttocks and B/L heels intact, preventative skin care orders are placed. Orders listed below and wound care will sign off, call or tiger text with questions. Bedside nurse updated of findings and orders. Flowsheets below with pictures and measurements. Skin care plans:  1-Hydraguard to bilateral sacrum, buttock and heels BID and PRN  2-Elevate heels to offload pressure. 3-Ehob cushion in chair when out of bed. 4-Moisturize skin daily with skin nourishing cream.  5-Turn/reposition q2h or when medically stable for pressure re-distribution on skin. Unable to photo wound due to malfunction to rovers.             Megan ARGUETAN, RN, [facility]

## 2023-11-27 NOTE — PROGRESS NOTES
[address] [facility]  Progress Note  Name: Ayla Pittman  MRN: [id number]  Unit/Bed#: ICU 03 I Date of Admission: 9/13/2023   Date of Service: 11/27/2023 I Hospital Day: 76    Assessment/Plan   * Acute respiratory failure with hypoxia secondary to oropharyngeal mass  Assessment & Plan  Cuffless trach placed on 9/17. Transitioned to cuffed trach on 10/3. Respiratory breathing has been challenging in the setting of anxiety given recent events. Repeat imaging does not show improvement in bilateral consolidation or atelectasis and groundglass opacities on repeat. S/p bronch. Bronchial cx with 3 colonies CoNS. Completed 7 day course of IV abx w/ cefepime and Vancomycin. Bronchial culture and gram stain negative. Micro negative for CMV, AFB, Fungitell, Pneumocystis jiroveci (carinii), Histoplasma, Aspergillus. Trach secretions sent for sputum culture shows 2+ polys, 1+ gram-positive cocci in pairs, chains and clusters and 1+ gram-negative rods. Cuffless trach was placed 9/17. Replaced with a cuffed Shiley XLT #7 10/3. Has been tolerating nightly BiPAP via trach w/ settings EPAP 8, PSV 4. Etiology: Consider drug induced pneumonitis in the setting of chemo 2/2 newly dx lymphoma. CT chest showed Infiltrates in the lingula and left lower lobe. Improved from prior study   Working with case management to qualify patient for home vent. Plan:  Continue w/ BIPAP via trach at night. EPAP 8, PSV 4. Continue to titrate O2 to maintain SpO2 >85%  Aggressive pulmonary toilet   Incentive Spirometry   Suctioning via trach if patient is desatting and unable to expectorate phlegm. Continue w/ guaifenesin, Atrovent and Xopenex for airway maintenance. Continue inhalation treatment with Pulmicort and formoterol q12 , duonebs QID, atrovent and xopenex   Regular diet  Daily PT/OT    Large cell lymphoma (HCC)  Assessment & Plan  Large B-cell lymphoma, stage II. First chemo cycle 9/22 4 days of R-EPOCH. 9/27 Rituxan. 9/28 Filgrastim. Received nivestym 300 mc 7 days dcd on 10/26   Discontinue Prophylaxis as per Heme Oncology recommendations  Pt had hyperkalemia of 5.5, can be due to Bactrim, consider switching to atovaquone      9/22  4 days of R-EPOCH.  9/27 Rituxan  10/13 for [facility] cycle 2, which was done over 4 days and cytoxan on 10/19.  11/6 completed R-EPOCH cycle 3 and Cytoxan 11/7. Plan:  Continue with current chemo treatment 11/24-11/28. Continue Bactrim, Acyclovir, Allopurinol for prophylaxis   Transfuse blood products as needed. Keep Hgb>7 and Plt>20 for chemo   See acute respiratory failure above    Acute embolism and thrombosis of right internal jugular vein (HCC)  Assessment & Plan  CT soft tissues of the neck: Nonocclusive thrombus noted within the right internal jugular vein just above the Mediport entrance site into the internal jugular vein. No signs of pain, headaches, vision changes. Plan:  Continue w/ home Eliquis 5 mg     Oropharyngeal mass  Assessment & Plan  9/14 Neck/ soft tissue CT: Large enhancing soft tissue mass identified within the left neck involving multiple spaces. Centered within the left oropharynx with extensions as described above into multiple spaces. This results in significant airway compromise. 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 Small level 3/level 4 nodes are seen within the neck. Tracheostomy by ENT. Replaced on 9/26. H/o tobacco abuse, daily smoker. On nicotine while inpatient, confirmed with patient she needs nicotine patch. CT soft tissue neck shows reduced mass effect from 9/14 to 10/13. Can consider reducing trach size if continues to improve. Patient often refusing peg tube feeds but is feeding herself small meals orally. Plan:  ENT and heme onc following  Pt receiving her 4th cycle of chemotherapy - Day  (11/26)  As per speech therapist diet  Dysphagia 2. Continue parallel PEG tube feeds for nutrition.    See acute respiratory failure and large B cell lymphoma above. Pancytopenia due to chemotherapy [facility])  Assessment & Plan  Recent Labs     11/26/23  0617 11/26/23  0639 11/27/23  0640   HGB 9.3* 10.5* 10.2*       '  Patient received 1 PRBC transfusion on 10/5 and 10/25. Her B/L hemoglobin is 12-14. No sites of bleeding, no black stools. Iron panel indicative of inflammatory anemia. Normal Vitamin B12 levels. Folate low at 5.5. Filgrastim 4 doses administered by heme/onc. Hb declined from 9.9 to 7.4, pt received 1 PRBC transfusion (11/24)    Plan:  Trend hemoglobin and transfuse for <7. As per heme/onc transfuse irradiated and leukoreduced blood products. Trend platelets  Folic acid 1mg daily supplementation  As per conversation with Heme/oncology attending, patient pancytopenia associated with chemotherapy is expected/predictable. No further anemia w/u required. Low ANC management as per heme/onc    Blister (nonthermal) of left forearm, sequela  Assessment & Plan  Blisters of left upper extremity healing, no signs of infection, continue daily wound care. (HFpEF) heart failure with preserved ejection fraction [facility])  Assessment & Plan  Wt Readings from Last 3 Encounters:   11/27/23 75.8 kg (167 lb 1.7 oz)   09/07/23 74.6 kg (164 lb 6.4 oz)   08/30/23 73.3 kg (161 lb 9.6 oz)     Lab Results   Component Value Date    LVEF 65 11/21/2023     (H) 10/28/2023     (H) 09/29/2023     Echo 11/21/23: LVEF 65%. Plan:  K > 4   Measure I/O    Ambulatory dysfunction  Assessment & Plan  2/2 Impacted by chronic pain syndrome + prolonged hospital stay. Continue OOB with PT. PT rec post acute rehab however reportedly Cannot get LTACH placement while getting chemo per CM  She is aware STR SNFs continue to follow and treatment can be done from a SNF level of care. PT would need to be on trach collar for at least 72 hours with no need for the vent.  Aware that pt would need to be around 28% FiO2     Depression  Assessment & Plan  Patient on Zoloft 75 daily and Seroquel hs  Patient is depressed, multiple family/palliative discussions. patient is firm that she wants to live and to fight, she is just tired. Currently goal is disease treatment oriented. Full code. No SI/HI ideations. Palliative signed off, will reconsult if patient changes her mind regarding wishes. Chronic Superficial thrombophlebitis  Assessment & Plan  Right forearm soreness. RUE US: No acute or chronic deep vein thrombosis. 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 PO not severe enough to require renal dosing at this time, will continue to monitor and adjust dosing as needed. Continue DVT ppx with Eliquis     CKD (chronic kidney disease) stage 3, GFR 30-59 ml/min [facility])  Assessment & Plan  Lab Results   Component Value Date    EGFR 38 11/26/2023    EGFR 42 11/26/2023    EGFR 40 11/26/2023    CREATININE 1.38 (H) 11/26/2023    CREATININE 1.27 11/26/2023    CREATININE 1.33 (H) 11/26/2023     Baseline Cr between 0.75-1.1  Initial PO felt 2/2 prerenal azotemia 2/2 diuresis, reduced PO intake +/- ATN. Patient received 2 doses of Bumex IV 2 g as she had not been responding appropriately to IV 40 Lasix daily. Creatinine went up by 0.5 meeting criteria for an PO. Suspect most likely due to medications as a combination of prerenal and intrinsic. FENa was 0.2%, UA shows no casts or signs of infection, urine eosinophils 0%. Patient likely has prerenal PO from volume depletion. Received 2 L NS and 1 pRBC and cr went up by 0.07 still and Na and Cl went down, suspect that volume prevented further cr rise and free water excretion impaired by kidney function, allowing hyponatremia to increase. Suspect that now that nephrotoxic medications have been stopped she responded well to Lasix and creatinine downtrended. PO now resolved. Will be cautious about nephrotoxins and monitor as restarting EPOCH and ppx.  Patient gets volume depleted and overloaded very easily. Especially from chemo. Plan:   Continue to monitor UO/renal function. Avoid nephrotoxic agents  Follow up with BMP in the morning   Continue to monitor a.m. BMP. Pain syndrome, chronic  Assessment & Plan  Home regimen: Oxycodone 5 mg BID, Tylenol 650 mg q8 prn. Gabapentin 600 mg TID. Medical marijuana. Lumbar radiculopathy and impaired ambulatory dysfunction secondary to pain. Has bowel regimen and is having bowel movements daily. Patient required additional pain management during previous cycle and has some additional pain from tunneled line placement    Plan:  Continue gabapentin 300 mg TID, oxycodone 5 mg TID, prn Tylenol 650 mg q6. Oxycodone 5mg every 8 hours  Oxycodone 2.5 mg q6 PRN    Benign essential hypertension  Assessment & Plan  Home regimen Coreg 12.5 mg BID, Amlodipine 10 mg daily, and lisinopril 40 mg. All discontinued at this time secondary to hypotension and PO since cycle 1. but stable currently without any antihypertensives. Systolic persistently elevated and tachycardic, potentially secondary to pain. Patient was more hypotensive during last chemo. Coreg contraindicated during chemo, since cycles are every other week, would be better to stick with lopressor during duration of therapy as opposed to switching constantly. Plan:  Monitor vitals. Continue Norvasc 10 and lopressor 25 bid    Protein-calorie malnutrition ([address])  Assessment & Plan  Malnutrition Findings:   Adult Malnutrition type: Chronic illness  Adult Degree of Malnutrition: Unspecified protein calorie malnutrition  Malnutrition Characteristics: Inadequate energy, Other (comment) (stress of illness)                  360 Statement: Protein calorie malnutrition r/t inadequate intake as evidenced by >7 day period of poor intakes and stress of infection; currently treated with regular diet and nutrition supplements    BMI Findings: Body mass index is 32.64 kg/m². Disposition: Stepdown Level 1    ICU Core Measures     Vented Patient  VAP Bundle  VAP bundle ordered     A: Assess, Prevent, and Manage Pain Has pain been assessed? Yes  Need for changes to pain regimen? No   B: Both Spontaneous Awakening Trials (SATs) and Spontaneous Breathing Trials (SBTs) Plan to perform spontaneous awakening trial today? N/A   Plan to perform spontaneous breathing trial today? N/A   Obvious barriers to extubation? NA   C: Choice of Sedation RASS Goal: 0 Alert and Calm  Need for changes to sedation or analgesia regimen? No   D: Delirium CAM-ICU: Negative   E: Early Mobility  Plan for early mobility? NA   F: Family Engagement Plan for family engagement today? NA       Antibiotic Review: Patient on appropriate coverage based on culture data. Review of Invasive Devices:      Central access plan: Medications requiring central line      Prophylaxis:  VTE VTE covered by:  apixaban, Oral, 5 mg at 11/26/23 2157       Stress Ulcer  covered byfamotidine (PEPCID) tablet 20 mg [[id number]]         Significant 24hr Events     24hr events: No events overnight. Pt tolerated BiPAP via vent. Subjective   Review of Systems   Constitutional:  Negative for chills, fatigue and fever. HENT:  Positive for voice change. Eyes: Negative. Respiratory:  Negative for cough, chest tightness and shortness of breath. Cardiovascular:  Negative for chest pain, palpitations and leg swelling. Gastrointestinal:  Negative for abdominal pain, diarrhea, nausea and vomiting. Endocrine: Negative. Genitourinary: Negative. Neurological: Negative. Negative for dizziness, light-headedness and headaches. Hematological: Negative. Psychiatric/Behavioral: Negative. All other systems reviewed and are negative.      Objective                            Vitals I/O      Most Recent Min/Max in 24hrs   Temp 99.3 °F (37.4 °C) Temp  Min: 98.7 °F (37.1 °C)  Max: 99.3 °F (37.4 °C)   Pulse 89 Pulse  Min: 87 Max: 89   Resp 15 Resp  Min: 12  Max: 15   /71 BP  Min: 124/70  Max: 168/90   O2 Sat 100 % SpO2  Min: 92 %  Max: 100 %      Intake/Output Summary (Last 24 hours) at 11/27/2023 0717  Last data filed at 11/26/2023 2200  Gross per 24 hour   Intake 953.67 ml   Output 300 ml   Net 653.67 ml       Diet Regular; Regular House; Potassium 2 GM    Invasive Monitoring           Physical Exam   Physical Exam  Eyes:      General: Vision grossly intact. Extraocular Movements: Extraocular movements intact. Conjunctiva/sclera: Conjunctivae normal.      Pupils: Pupils are equal, round, and reactive to light. Skin:     General: Skin is warm. HENT:      Head: Normocephalic and atraumatic. Right Ear: Hearing normal.      Left Ear: Hearing normal.      Mouth/Throat:      Mouth: Mucous membranes are moist.   Neck:      Trachea: Tracheostomy present. Comments: tunneled DL RIJ catheter w/o purulence  Cardiovascular:      Rate and Rhythm: Normal rate and regular rhythm. Pulses: Normal pulses. Heart sounds: Normal heart sounds. Abdominal: General: Bowel sounds are normal.   Constitutional:       Appearance: She is ill-appearing. Pulmonary:      Effort: No respiratory distress. Breath sounds: Rhonchi present. Chest:      Chest wall: No tenderness. Neurological:      Mental Status: She is alert and oriented to person, place and time.             Diagnostic Studies      Imaging: No imaging done in the past 24 hours      Medications:  Scheduled PRN   acyclovir, 400 mg, BID  allopurinol, 200 mg, 4x Daily  amLODIPine, 10 mg, Daily  apixaban, 5 mg, BID  budesonide, 0.5 mg, Q12H  busPIRone, 30 mg, TID  chlorhexidine, 15 mL, Q12H [address]  [START ON 11/28/2023] cyclophosphamide (CYTOXAN) 1,350 mg in sodium chloride 0.9 % 250 mL IVPB, 750 mg/m2 (Treatment Plan Recorded), Once  DOXOrubicin (ADRIAMYCIN) 18 mg, etoposide (TOPOSAR) 67.6 mg, vinCRIStine (ONCOVIN) 0.7 mg in sodium chloride 0.9 % 500 mL infusion, , Q24H  famotidine, 20 mg, BID  fluconazole, 768 mg, Daily  folic acid, 1 mg, Daily  formoterol, 20 mcg, Q12H  gabapentin, 300 mg, TID  levalbuterol, 1.25 mg, Q6H   And  ipratropium, 0.5 mg, Q6H  levofloxacin, 250 mg, Q24H ASHLEY  melatonin, 3 mg, HS  metoprolol tartrate, 25 mg, Q12H [address]  nicotine, 7 mg, Daily  ondansetron (ZOFRAN) 16 mg, dexamethasone (DECADRON) 10 mg in sodium chloride 0.9 % 50 mL IVPB, , Q24H  oxyCODONE, 5 mg, Q8H  polyethylene glycol, 17 g, Daily  predniSONE, 60 mg/m2 (Treatment Plan Recorded), BID With Meals  QUEtiapine, 50 mg, HS  senna, 17.6 mg, BID  sertraline, 75 mg, Daily  [START ON 11/28/2023] sodium chloride, 2,000 mL, Once  sulfamethoxazole-trimethoprim, 1 tablet, Once per day on Mon Wed Fri      alteplase, 2 mg, Q1MIN PRN  alteplase, 2 mg, Q1MIN PRN  alteplase, 2 mg, Q1MIN PRN  alteplase, 2 mg, Q1MIN PRN  ondansetron, 4 mg, Q8H PRN  ondansetron, 4 mg, Q8H PRN  oxyCODONE, 2.5 mg, Q6H PRN  sodium chloride, 20 mL/hr, Daily PRN       Continuous          Labs:    CBC    Recent Labs     11/26/23  0617 11/26/23  0639 11/27/23  0640   WBC 7.70  --  4.82   HGB 9.3* 10.5* 10.2*   HCT 29.8* 31* 32.2*     --  388   BANDSPCT 2  --   --      BMP    Recent Labs     11/26/23  0912 11/26/23 2035   SODIUM 142 143   K 3.9 4.1    102   CO2 32 32   AGAP 8 9   BUN 27* 28*   CREATININE 1.27 1.38*   CALCIUM 9.8 9.7       Coags    No recent results     Additional Electrolytes  Recent Labs     11/26/23  0639   CAIONIZED 1.31          Blood Gas    No recent results  No recent results LFTs  Recent Labs     11/26/23  0912 11/26/23 2035   ALT 9 13   AST 11* 16   ALKPHOS 64 69   ALB 3.2* 3.4*   TBILI 0.24 0.26       Infectious  No recent results  Glucose  Recent Labs     11/25/23  1938 11/26/23  0754 11/26/23 0912 11/26/23 2035   GLUC 165* 136 145* 201*                   Christal Bal MD

## 2023-11-28 LAB
ALBUMIN SERPL BCP-MCNC: 3.2 G/DL (ref 3.5–5)
ALP SERPL-CCNC: 56 U/L (ref 34–104)
ALT SERPL W P-5'-P-CCNC: 18 U/L (ref 7–52)
ANION GAP SERPL CALCULATED.3IONS-SCNC: 8 MMOL/L
AST SERPL W P-5'-P-CCNC: 18 U/L (ref 13–39)
BASOPHILS # BLD AUTO: 0 THOUSANDS/ÂΜL (ref 0–0.1)
BASOPHILS NFR BLD AUTO: 0 % (ref 0–1)
BILIRUB SERPL-MCNC: 0.33 MG/DL (ref 0.2–1)
BUN SERPL-MCNC: 31 MG/DL (ref 5–25)
CALCIUM ALBUM COR SERPL-MCNC: 10.1 MG/DL (ref 8.3–10.1)
CALCIUM SERPL-MCNC: 9.5 MG/DL (ref 8.4–10.2)
CHLORIDE SERPL-SCNC: 103 MMOL/L (ref 96–108)
CO2 SERPL-SCNC: 30 MMOL/L (ref 21–32)
CREAT SERPL-MCNC: 0.98 MG/DL (ref 0.6–1.3)
EOSINOPHIL # BLD AUTO: 0 THOUSAND/ÂΜL (ref 0–0.61)
EOSINOPHIL NFR BLD AUTO: 0 % (ref 0–6)
ERYTHROCYTE [DISTWIDTH] IN BLOOD BY AUTOMATED COUNT: 15.2 % (ref 11.6–15.1)
GFR SERPL CREATININE-BSD FRML MDRD: 58 ML/MIN/1.73SQ M
GLUCOSE SERPL-MCNC: 146 MG/DL (ref 65–140)
HCT VFR BLD AUTO: 30.5 % (ref 34.8–46.1)
HGB BLD-MCNC: 9.7 G/DL (ref 11.5–15.4)
IMM GRANULOCYTES # BLD AUTO: 0.01 THOUSAND/UL (ref 0–0.2)
IMM GRANULOCYTES NFR BLD AUTO: 0 % (ref 0–2)
LYMPHOCYTES # BLD AUTO: 0.08 THOUSANDS/ÂΜL (ref 0.6–4.47)
LYMPHOCYTES NFR BLD AUTO: 3 % (ref 14–44)
MCH RBC QN AUTO: 29 PG (ref 26.8–34.3)
MCHC RBC AUTO-ENTMCNC: 31.8 G/DL (ref 31.4–37.4)
MCV RBC AUTO: 91 FL (ref 82–98)
MONOCYTES # BLD AUTO: 0.07 THOUSAND/ÂΜL (ref 0.17–1.22)
MONOCYTES NFR BLD AUTO: 2 % (ref 4–12)
MYCOBACTERIUM SPEC CULT: NORMAL
MYCOBACTERIUM SPEC CULT: NORMAL
NEUTROPHILS # BLD AUTO: 2.97 THOUSANDS/ÂΜL (ref 1.85–7.62)
NEUTS SEG NFR BLD AUTO: 95 % (ref 43–75)
NRBC BLD AUTO-RTO: 0 /100 WBCS
PLATELET # BLD AUTO: 354 THOUSANDS/UL (ref 149–390)
PMV BLD AUTO: 9 FL (ref 8.9–12.7)
POTASSIUM SERPL-SCNC: 3.5 MMOL/L (ref 3.5–5.3)
PROT SERPL-MCNC: 6.1 G/DL (ref 6.4–8.4)
RBC # BLD AUTO: 3.34 MILLION/UL (ref 3.81–5.12)
RHODAMINE-AURAMINE STN SPEC: NORMAL
RHODAMINE-AURAMINE STN SPEC: NORMAL
SODIUM SERPL-SCNC: 141 MMOL/L (ref 135–147)
WBC # BLD AUTO: 3.13 THOUSAND/UL (ref 4.31–10.16)

## 2023-11-28 PROCEDURE — 94760 N-INVAS EAR/PLS OXIMETRY 1: CPT

## 2023-11-28 PROCEDURE — 94150 VITAL CAPACITY TEST: CPT

## 2023-11-28 PROCEDURE — 85027 COMPLETE CBC AUTOMATED: CPT

## 2023-11-28 PROCEDURE — 99291 CRITICAL CARE FIRST HOUR: CPT | Performed by: INTERNAL MEDICINE

## 2023-11-28 PROCEDURE — 3E04305 INTRODUCTION OF OTHER ANTINEOPLASTIC INTO CENTRAL VEIN, PERCUTANEOUS APPROACH: ICD-10-PCS | Performed by: INTERNAL MEDICINE

## 2023-11-28 PROCEDURE — 94640 AIRWAY INHALATION TREATMENT: CPT

## 2023-11-28 PROCEDURE — 94003 VENT MGMT INPAT SUBQ DAY: CPT

## 2023-11-28 PROCEDURE — 80053 COMPREHEN METABOLIC PANEL: CPT

## 2023-11-28 PROCEDURE — 99233 SBSQ HOSP IP/OBS HIGH 50: CPT | Performed by: INTERNAL MEDICINE

## 2023-11-28 RX ORDER — SODIUM CHLORIDE 9 MG/ML
20 INJECTION, SOLUTION INTRAVENOUS ONCE
Status: COMPLETED | OUTPATIENT
Start: 2023-11-29 | End: 2023-11-29

## 2023-11-28 RX ORDER — ACETAMINOPHEN 325 MG/1
650 TABLET ORAL ONCE
Status: COMPLETED | OUTPATIENT
Start: 2023-11-29 | End: 2023-11-29

## 2023-11-28 RX ORDER — POTASSIUM CHLORIDE 20MEQ/15ML
40 LIQUID (ML) ORAL ONCE
Status: COMPLETED | OUTPATIENT
Start: 2023-11-28 | End: 2023-11-28

## 2023-11-28 RX ORDER — POTASSIUM CHLORIDE 20MEQ/15ML
40 LIQUID (ML) ORAL ONCE
Status: DISCONTINUED | OUTPATIENT
Start: 2023-11-28 | End: 2023-11-28 | Stop reason: SDUPTHER

## 2023-11-28 RX ADMIN — CHLORHEXIDINE GLUCONATE 15 ML: 1.2 SOLUTION ORAL at 08:05

## 2023-11-28 RX ADMIN — SODIUM CHLORIDE 2000 ML: 0.9 INJECTION, SOLUTION INTRAVENOUS at 08:30

## 2023-11-28 RX ADMIN — IPRATROPIUM BROMIDE 0.5 MG: 0.5 SOLUTION RESPIRATORY (INHALATION) at 07:00

## 2023-11-28 RX ADMIN — BUDESONIDE 0.5 MG: 0.5 INHALANT ORAL at 19:40

## 2023-11-28 RX ADMIN — FORMOTEROL FUMARATE DIHYDRATE 20 MCG: 20 SOLUTION RESPIRATORY (INHALATION) at 07:00

## 2023-11-28 RX ADMIN — IPRATROPIUM BROMIDE 0.5 MG: 0.5 SOLUTION RESPIRATORY (INHALATION) at 19:40

## 2023-11-28 RX ADMIN — FAMOTIDINE 20 MG: 20 TABLET, FILM COATED ORAL at 17:05

## 2023-11-28 RX ADMIN — NICOTINE 7 MG: 7 PATCH, EXTENDED RELEASE TRANSDERMAL at 08:05

## 2023-11-28 RX ADMIN — GABAPENTIN 300 MG: 300 CAPSULE ORAL at 17:05

## 2023-11-28 RX ADMIN — FOLIC ACID 1 MG: 1 TABLET ORAL at 08:03

## 2023-11-28 RX ADMIN — CYCLOPHOSPHAMIDE 1350 MG: 1 INJECTION, POWDER, FOR SOLUTION INTRAVENOUS; ORAL at 11:18

## 2023-11-28 RX ADMIN — LEVALBUTEROL HYDROCHLORIDE 1.25 MG: 1.25 SOLUTION RESPIRATORY (INHALATION) at 14:57

## 2023-11-28 RX ADMIN — METOPROLOL TARTRATE 25 MG: 25 TABLET, FILM COATED ORAL at 20:28

## 2023-11-28 RX ADMIN — IPRATROPIUM BROMIDE 0.5 MG: 0.5 SOLUTION RESPIRATORY (INHALATION) at 14:57

## 2023-11-28 RX ADMIN — PREDNISONE 100 MG: 50 TABLET ORAL at 17:05

## 2023-11-28 RX ADMIN — QUETIAPINE FUMARATE 50 MG: 25 TABLET ORAL at 21:26

## 2023-11-28 RX ADMIN — IPRATROPIUM BROMIDE 0.5 MG: 0.5 SOLUTION RESPIRATORY (INHALATION) at 02:17

## 2023-11-28 RX ADMIN — FORMOTEROL FUMARATE DIHYDRATE 20 MCG: 20 SOLUTION RESPIRATORY (INHALATION) at 19:40

## 2023-11-28 RX ADMIN — ACYCLOVIR 400 MG: 200 CAPSULE ORAL at 08:02

## 2023-11-28 RX ADMIN — LEVALBUTEROL HYDROCHLORIDE 1.25 MG: 1.25 SOLUTION RESPIRATORY (INHALATION) at 02:17

## 2023-11-28 RX ADMIN — APIXABAN 5 MG: 5 TABLET, FILM COATED ORAL at 20:28

## 2023-11-28 RX ADMIN — GABAPENTIN 300 MG: 300 CAPSULE ORAL at 20:28

## 2023-11-28 RX ADMIN — ALLOPURINOL 200 MG: 100 TABLET ORAL at 21:30

## 2023-11-28 RX ADMIN — SERTRALINE 75 MG: 25 TABLET, FILM COATED ORAL at 08:02

## 2023-11-28 RX ADMIN — CHLORHEXIDINE GLUCONATE 15 ML: 1.2 SOLUTION ORAL at 20:28

## 2023-11-28 RX ADMIN — LEVALBUTEROL HYDROCHLORIDE 1.25 MG: 1.25 SOLUTION RESPIRATORY (INHALATION) at 19:39

## 2023-11-28 RX ADMIN — GABAPENTIN 300 MG: 300 CAPSULE ORAL at 08:03

## 2023-11-28 RX ADMIN — LEVALBUTEROL HYDROCHLORIDE 1.25 MG: 1.25 SOLUTION RESPIRATORY (INHALATION) at 07:00

## 2023-11-28 RX ADMIN — ACYCLOVIR 400 MG: 200 CAPSULE ORAL at 17:06

## 2023-11-28 RX ADMIN — OXYCODONE HYDROCHLORIDE 5 MG: 5 SOLUTION ORAL at 12:34

## 2023-11-28 RX ADMIN — ALLOPURINOL 200 MG: 100 TABLET ORAL at 08:02

## 2023-11-28 RX ADMIN — ALLOPURINOL 200 MG: 100 TABLET ORAL at 17:05

## 2023-11-28 RX ADMIN — APIXABAN 5 MG: 5 TABLET, FILM COATED ORAL at 08:03

## 2023-11-28 RX ADMIN — FLUCONAZOLE 200 MG: 100 TABLET ORAL at 08:03

## 2023-11-28 RX ADMIN — SODIUM CHLORIDE 20 ML/HR: 0.9 INJECTION, SOLUTION INTRAVENOUS at 10:45

## 2023-11-28 RX ADMIN — METOPROLOL TARTRATE 25 MG: 25 TABLET, FILM COATED ORAL at 08:03

## 2023-11-28 RX ADMIN — OXYCODONE HYDROCHLORIDE 5 MG: 5 SOLUTION ORAL at 05:42

## 2023-11-28 RX ADMIN — PREDNISONE 100 MG: 50 TABLET ORAL at 08:02

## 2023-11-28 RX ADMIN — MELATONIN 3 MG: at 21:25

## 2023-11-28 RX ADMIN — BUSPIRONE HYDROCHLORIDE 30 MG: 10 TABLET ORAL at 08:02

## 2023-11-28 RX ADMIN — ONDANSETRON: 2 INJECTION INTRAMUSCULAR; INTRAVENOUS at 10:45

## 2023-11-28 RX ADMIN — BUDESONIDE 0.5 MG: 0.5 INHALANT ORAL at 07:00

## 2023-11-28 RX ADMIN — OXYCODONE HYDROCHLORIDE 5 MG: 5 SOLUTION ORAL at 20:26

## 2023-11-28 RX ADMIN — LEVOFLOXACIN 250 MG: 250 TABLET, FILM COATED ORAL at 08:03

## 2023-11-28 RX ADMIN — FAMOTIDINE 20 MG: 20 TABLET, FILM COATED ORAL at 08:03

## 2023-11-28 RX ADMIN — BUSPIRONE HYDROCHLORIDE 30 MG: 10 TABLET ORAL at 17:06

## 2023-11-28 RX ADMIN — BUSPIRONE HYDROCHLORIDE 30 MG: 10 TABLET ORAL at 20:29

## 2023-11-28 RX ADMIN — ALLOPURINOL 200 MG: 100 TABLET ORAL at 12:34

## 2023-11-28 RX ADMIN — AMLODIPINE BESYLATE 10 MG: 5 TABLET ORAL at 08:02

## 2023-11-28 RX ADMIN — POTASSIUM CHLORIDE 40 MEQ: 1.5 SOLUTION ORAL at 08:02

## 2023-11-28 NOTE — PROGRESS NOTES
Medical Oncology/Hematology Follow UP Note  Wilma Bacon, female, 77 y. o., 1955,  ICU 03/ICU 03, [id number]     Assessment and Plan  1. 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 Tolerating treatment well chemotherapy. No issues concerns or complaints at this time. Labs reviewed and is okay for her to continue with treatment and get the 5 of her regimen today. We will continue to monitor her closely. Will continue to monitor daily labs including tumor lysis. Continue supportive care recommend transfusion for hemoglobin less than 7 and platelets less than 15,289. Will receive rituximab tomorrow. Will start [place] support on 11/30/2023 daily and monitor counts. This is used in order to prevent neutropenia. Will continue to follow along but please call with issues or concerns. Subjective:  Doing well this morning. Watching TV sitting up in the bed. No issues or complaints. Denies nausea. Denies any additional concerns with getting her treatment. Review of Systems:   Review of Systems   Constitutional:  Negative for chills and fever. HENT:  Negative for ear pain, mouth sores, sore throat, trouble swallowing and voice change. Eyes:  Negative for pain and visual disturbance. Respiratory:  Negative for cough and shortness of breath.         + trach - oxygenation support    Cardiovascular:  Negative for chest pain and palpitations. Gastrointestinal:  Positive for diarrhea. Negative for abdominal pain and vomiting. Genitourinary:  Negative for dysuria and hematuria. Musculoskeletal:  Negative for arthralgias and back pain. Skin:  Negative for color change and rash. Neurological:  Negative for seizures and syncope. Hematological:  Negative for adenopathy. All other systems reviewed and are negative.       Past Medical History:   Diagnosis Date    Anxiety     Depression     Fatty liver     Hyperlipidemia     Hypertension     Psychiatric disorder     Varicella        Past Surgical History:   Procedure Laterality Date    BREAST SURGERY Bilateral     Reduction Procedure    CARDIAC SURGERY       SECTION      x4    DILATION AND CURETTAGE OF UTERUS      ECTOPIC PREGNANCY SURGERY      IR GASTROSTOMY TUBE PLACEMENT  2023    IR TUNNELED CENTRAL LINE PLACEMENT  10/19/2023     [address] INCL FLUOR GDNCE DX W/CELL WASHG SPX N/A 2023    Procedure: Carina Sean;  Surgeon: Toney Rapp MD;  Location: [facility];  Service: ENT    TRACHEOSTOMY N/A 2023    Procedure: TRACHEOSTOMY, biopsy of nasopharynx mass;  Surgeon: Toney Rapp MD;  [facility];  Service: ENT       Family History   Problem Relation Age of Onset    Lung cancer Mother     Cirrhosis Father     Hypertension Sister     Bipolar disorder Sister     Heart disease Sister     Diabetes Family     Heart disease Family     Hypertension Family     Stroke Family     Thyroid disease Family        Social History     Socioeconomic History    Marital status:      Spouse name: None    Number of children: None    Years of education: None    Highest education level: None   Occupational History    Occupation: retired   Tobacco Use    Smoking status: Every Day     Packs/day: 1.00     Types: Cigarettes    Smokeless tobacco: Never    Tobacco comments:     2 Black and milds per day   Vaping Use    Vaping Use: Never used   Substance and Sexual Activity    Alcohol use: Not Currently    Drug use: Yes     Types: Marijuana     Comment: for pain    Sexual activity: Not Currently     Partners: Male     Birth control/protection: None   Other Topics Concern    None   Social History Narrative    Educational level-special ed/completed Master's Degree     Social Determinants of Health     Financial Resource Strain: Low Risk  (2023)    Overall Financial Resource Strain (CARDIA)     Difficulty of Paying Living Expenses: Not hard at all   Food Insecurity: Not on file Transportation Needs: No Transportation Needs (9/18/2023)    PRAPARE - Transportation     Lack of Transportation (Medical): No     Lack of Transportation (Non-Medical):  No   Physical Activity: Not on file   Stress: Not on file   Social Connections: Not on file   Intimate Partner Violence: Not on file   Housing Stability: Not on file         Current Facility-Administered Medications:     [START ON 11/29/2023] acetaminophen (TYLENOL) tablet 650 mg, 650 mg, Oral, Once, Jes Gordon MD    acyclovir (ZOVIRAX) capsule 400 mg, 400 mg, Oral, BID, Jes Gordon MD, 400 mg at 11/28/23 0802    allopurinol (ZYLOPRIM) tablet 200 mg, 200 mg, Oral, 4x Daily, Kenny Groevr MD, 200 mg at 11/28/23 1234    alteplase (CATHFLO) injection 2 mg, 2 mg, Intracatheter, Q1MIN PRN, RANDEE Silverman, 2 mg at 11/17/23 1435    alteplase (CATHFLO) injection 2 mg, 2 mg, Intracatheter, Q1MIN PRN, RANDEE Silverman    alteplase (CATHFLO) injection 2 mg, 2 mg, Intracatheter, Q1MIN PRN, Jes Gordon MD    alteplase (CATHFLO) injection 2 mg, 2 mg, Intracatheter, Q1MIN PRN, Jes Gordon MD    alteplase (CATHFLO) injection 2 mg, 2 mg, Intracatheter, Q1MIN PRN, Jes Gordon MD    amLODIPine (NORVASC) tablet 10 mg, 10 mg, Oral, Daily, RANDEE Chavarria, 10 mg at 11/28/23 0802    apixaban (ELIQUIS) tablet 5 mg, 5 mg, Oral, BID, Enoch Weiss MD, 5 mg at 11/28/23 0803    budesonide (PULMICORT) inhalation solution 0.5 mg, 0.5 mg, Nebulization, Q12H, RANDEE Chavarria, 0.5 mg at 11/28/23 0700    busPIRone (BUSPAR) tablet 30 mg, 30 mg, Oral, TID, RANDEE Chavarria, 30 mg at 11/28/23 0802    chlorhexidine (PERIDEX) 0.12 % oral rinse 15 mL, 15 mL, Mouth/Throat, Q12H [facility], RANDEE Chavarria, 15 mL at 11/28/23 0805    [START ON 11/29/2023] diphenhydrAMINE (BENADRYL) 25 mg in sodium chloride 0.9 % 50 mL IVPB, 25 mg, Intravenous, Once, Jes Gordon MD    famotidine (PEPCID) tablet 20 mg, 20 mg, Oral, BID, RANDEE Chavarria, 20 mg at 11/28/23 5385    fluconazole (DIFLUCAN) tablet 200 mg, 200 mg, Oral, Daily, Ge Masters MD, 200 mg at 06/89/70 8656    folic acid (FOLVITE) tablet 1 mg, 1 mg, Oral, Daily, RANDEE Chavarria, 1 mg at 11/28/23 0803    formoterol (PERFOROMIST) nebulizer solution 20 mcg, 20 mcg, Nebulization, Q12H, RANDEE Chavarria, 20 mcg at 11/28/23 0700    gabapentin (NEURONTIN) capsule 300 mg, 300 mg, Oral, TID, RANDEE Chavarria, 300 mg at 11/28/23 0803    levalbuterol (XOPENEX) inhalation solution 1.25 mg, 1.25 mg, Nebulization, Q6H, 1.25 mg at 11/28/23 0700 **AND** ipratropium (ATROVENT) 0.02 % inhalation solution 0.5 mg, 0.5 mg, Nebulization, Q6H, RANDEE Chavarria, 0.5 mg at 11/28/23 0700    levofloxacin (LEVAQUIN) tablet 250 mg, 250 mg, Oral, Q24H [address], Ge Masters MD, 250 mg at 11/28/23 0803    melatonin tablet 3 mg, 3 mg, Oral, HS, RANDEE Chavarria, 3 mg at 11/27/23 2118    metoprolol tartrate (LOPRESSOR) tablet 25 mg, 25 mg, Oral, Q12H [address], RANDEE Chavarria, 25 mg at 11/28/23 0803    [COMPLETED] nicotine (NICODERM CQ) 14 mg/24hr TD 24 hr patch 14 mg, 14 mg, Transdermal, Daily, 14 mg at 10/15/23 1002 **FOLLOWED BY** nicotine (NICODERM CQ) 7 mg/24hr TD 24 hr patch 7 mg, 7 mg, Transdermal, Daily, RANDEE Chavarria, 7 mg at 11/28/23 0805    ondansetron (ZOFRAN) injection 4 mg, 4 mg, Intravenous, Q8H PRN, RANDEE Chavarria, 4 mg at 11/22/23 2009    ondansetron (ZOFRAN) injection 4 mg, 4 mg, Intravenous, Q8H PRN, Ge Masters MD    oxyCODONE (ROXICODONE) oral solution 2.5 mg, 2.5 mg, Oral, Q6H PRN, RANDEE Chavarria, 2.5 mg at 11/20/23 0931    oxyCODONE (ROXICODONE) oral solution 5 mg, 5 mg, Oral, Q8H, RANDEE Chavarria, 5 mg at 11/28/23 1234    polyethylene glycol (MIRALAX) packet 17 g, 17 g, Oral, Daily, Noemy Mcpherson PA-C    predniSONE tablet 100 mg, 60 mg/m2 (Treatment Plan Recorded), Oral, BID With Meals, Madison BUCHANAN Arpan Lamb MD, 100 mg at 11/28/23 0802    QUEtiapine (SEROquel) tablet 50 mg, 50 mg, Oral, HS, RANDEE Chavarria, 50 mg at 11/27/23 2118    [START ON 11/29/2023] riTUXimab (RITUXAN) 700 mg in sodium chloride 0.9 % 280 mL subsequent titrated chemo infusion, 700 mg, Intravenous, Once, Catina You MD    senna oral syrup 17.6 mg, 17.6 mg, Oral, BID, Noemy Mcpherson PA-C, 17.6 mg at 11/24/23 1704    sertraline (ZOLOFT) tablet 75 mg, 75 mg, Per PEG Tube, Daily, RANDEE Chavarria, 75 mg at 11/28/23 0802    sodium chloride 0.9 % infusion, 20 mL/hr, Intravenous, Daily PRN, Catina You MD, Last Rate: 20 mL/hr at 11/28/23 1045, 20 mL/hr at 11/28/23 1045    [START ON 11/29/2023] sodium chloride 0.9 % infusion, 20 mL/hr, Intravenous, Once, Catina You MD    sulfamethoxazole-trimethoprim (BACTRIM DS) 800-160 mg per tablet 1 tablet, 1 tablet, Oral, Once per day on Mon Wed Fri, Catina You MD, 1 tablet at 11/27/23 0803    Medications Prior to Admission   Medication    acetaminophen (TYLENOL) 650 mg CR tablet    albuterol (PROVENTIL HFA,VENTOLIN HFA) 90 mcg/act inhaler    amLODIPine (NORVASC) 10 mg tablet    ASPIRIN-ACETAMINOPHEN-CAFFEINE PO    Calcium Carb-Cholecalciferol (OSCAL-D) 500 mg-200 units per tablet    carvedilol (COREG) 25 mg tablet    diclofenac sodium (VOLTAREN) 1 %    EPINEPHrine (EPIPEN) 0.3 mg/0.3 mL SOAJ    famotidine (PEPCID) 20 mg tablet    fluticasone (FLONASE) 50 mcg/act nasal spray    fluticasone-umeclidinium-vilanterol (Trelegy Ellipta) 100-62.5-25 mcg/actuation inhaler    gabapentin (NEURONTIN) 600 MG tablet    loratadine (CLARITIN) 10 mg tablet    miconazole (MONISTAT-7) 2 % vaginal cream    naloxone (NARCAN) 4 mg/0.1 mL nasal spray    nicotine (NICODERM CQ) 7 mg/24hr TD 24 hr patch    NON FORMULARY    oxyCODONE (OXY-IR) 5 MG capsule    sertraline (ZOLOFT) 50 mg tablet       Allergies   Allergen Reactions    Methyldopa Shortness Of Breath and Other (See Comments)     [place] Physical Exam:    /77 (BP Location: Right arm)   Pulse 80   Temp 99.1 °F (37.3 °C) (Oral)   Resp 17   Ht 5' (1.524 m)   Wt 76.2 kg (167 lb 15.9 oz)   SpO2 97%   BMI 32.81 kg/m²     Physical Exam  Constitutional:       General: She is not in acute distress. Appearance: She is not ill-appearing. HENT:      Head: Normocephalic and atraumatic. Right Ear: External ear normal.      Left Ear: External ear normal.      Nose: Nose normal.      Mouth/Throat:      Mouth: Mucous membranes are moist.      Pharynx: No oropharyngeal exudate. Eyes:      General: No scleral icterus. Right eye: No discharge. Left eye: No discharge. Conjunctiva/sclera: Conjunctivae normal.   Neck:      Comments: + trach and humifdiied oxygen support  Cardiovascular:      Rate and Rhythm: Normal rate. Pulmonary:      Effort: Pulmonary effort is normal. No respiratory distress. Abdominal:      General: There is no distension. Musculoskeletal:         General: Normal range of motion. Right lower leg: No edema. Left lower leg: No edema. Skin:     General: Skin is warm. Coloration: Skin is not jaundiced. Findings: No lesion. Neurological:      Mental Status: She is oriented to person, place, and time. Cranial Nerves: No cranial nerve deficit. Psychiatric:         Mood and Affect: Mood normal.         Behavior: Behavior normal.         Thought Content:  Thought content normal.         Judgment: Judgment normal.         Recent Results (from the past 48 hour(s))   Comprehensive metabolic panel    Collection Time: 11/26/23  8:35 PM   Result Value Ref Range    Sodium 143 135 - 147 mmol/L    Potassium 4.1 3.5 - 5.3 mmol/L    Chloride 102 96 - 108 mmol/L    CO2 32 21 - 32 mmol/L    ANION GAP 9 mmol/L    BUN 28 (H) 5 - 25 mg/dL    Creatinine 1.38 (H) 0.60 - 1.30 mg/dL    Glucose 201 (H) 65 - 140 mg/dL    Calcium 9.7 8.4 - 10.2 mg/dL    Corrected Calcium 10.2 (H) 8.3 - 10.1 mg/dL    AST 16 13 - 39 U/L    ALT 13 7 - 52 U/L    Alkaline Phosphatase 69 34 - 104 U/L    Total Protein 6.5 6.4 - 8.4 g/dL    Albumin 3.4 (L) 3.5 - 5.0 g/dL    Total Bilirubin 0.26 0.20 - 1.00 mg/dL    eGFR 38 ml/min/1.73sq m   CBC and differential    Collection Time: 11/27/23  6:40 AM   Result Value Ref Range    WBC 4.82 4.31 - 10.16 Thousand/uL    RBC 3.44 (L) 3.81 - 5.12 Million/uL    Hemoglobin 10.2 (L) 11.5 - 15.4 g/dL    Hematocrit 32.2 (L) 34.8 - 46.1 %    MCV 94 82 - 98 fL    MCH 29.7 26.8 - 34.3 pg    MCHC 31.7 31.4 - 37.4 g/dL    RDW 15.9 (H) 11.6 - 15.1 %    MPV 9.1 8.9 - 12.7 fL    Platelets 134 341 - 159 Thousands/uL    nRBC 0 /100 WBCs    Neutrophils Relative 94 (H) 43 - 75 %    Immat GRANS % 1 0 - 2 %    Lymphocytes Relative 3 (L) 14 - 44 %    Monocytes Relative 2 (L) 4 - 12 %    Eosinophils Relative 0 0 - 6 %    Basophils Relative 0 0 - 1 %    Neutrophils Absolute 4.53 1.85 - 7.62 Thousands/µL    Immature Grans Absolute 0.05 0.00 - 0.20 Thousand/uL    Lymphocytes Absolute 0.12 (L) 0.60 - 4.47 Thousands/µL    Monocytes Absolute 0.11 (L) 0.17 - 1.22 Thousand/µL    Eosinophils Absolute 0.00 0.00 - 0.61 Thousand/µL    Basophils Absolute 0.01 0.00 - 0.10 Thousands/µL   LD,Blood    Collection Time: 11/27/23  6:40 AM   Result Value Ref Range     140 - 271 U/L   Comprehensive metabolic panel    Collection Time: 11/27/23  6:40 AM   Result Value Ref Range    Sodium 143 135 - 147 mmol/L    Potassium 4.0 3.5 - 5.3 mmol/L    Chloride 106 96 - 108 mmol/L    CO2 30 21 - 32 mmol/L    ANION GAP 7 mmol/L    BUN 31 (H) 5 - 25 mg/dL    Creatinine 1.15 0.60 - 1.30 mg/dL    Glucose 138 65 - 140 mg/dL    Calcium 9.7 8.4 - 10.2 mg/dL    Corrected Calcium 10.3 (H) 8.3 - 10.1 mg/dL    AST 16 13 - 39 U/L    ALT 14 7 - 52 U/L    Alkaline Phosphatase 61 34 - 104 U/L    Total Protein 6.5 6.4 - 8.4 g/dL    Albumin 3.3 (L) 3.5 - 5.0 g/dL    Total Bilirubin 0.33 0.20 - 1.00 mg/dL    eGFR 48 ml/min/1.73sq m   CBC and differential    Collection Time: 11/28/23  5:24 AM   Result Value Ref Range    WBC 3.13 (L) 4.31 - 10.16 Thousand/uL    RBC 3.34 (L) 3.81 - 5.12 Million/uL    Hemoglobin 9.7 (L) 11.5 - 15.4 g/dL    Hematocrit 30.5 (L) 34.8 - 46.1 %    MCV 91 82 - 98 fL    MCH 29.0 26.8 - 34.3 pg    MCHC 31.8 31.4 - 37.4 g/dL    RDW 15.2 (H) 11.6 - 15.1 %    MPV 9.0 8.9 - 12.7 fL    Platelets 758 618 - 983 Thousands/uL    nRBC 0 /100 WBCs    Neutrophils Relative 95 (H) 43 - 75 %    Immat GRANS % 0 0 - 2 %    Lymphocytes Relative 3 (L) 14 - 44 %    Monocytes Relative 2 (L) 4 - 12 %    Eosinophils Relative 0 0 - 6 %    Basophils Relative 0 0 - 1 %    Neutrophils Absolute 2.97 1.85 - 7.62 Thousands/µL    Immature Grans Absolute 0.01 0.00 - 0.20 Thousand/uL    Lymphocytes Absolute 0.08 (L) 0.60 - 4.47 Thousands/µL    Monocytes Absolute 0.07 (L) 0.17 - 1.22 Thousand/µL    Eosinophils Absolute 0.00 0.00 - 0.61 Thousand/µL    Basophils Absolute 0.00 0.00 - 0.10 Thousands/µL   Comprehensive metabolic panel    Collection Time: 11/28/23  5:24 AM   Result Value Ref Range    Sodium 141 135 - 147 mmol/L    Potassium 3.5 3.5 - 5.3 mmol/L    Chloride 103 96 - 108 mmol/L    CO2 30 21 - 32 mmol/L    ANION GAP 8 mmol/L    BUN 31 (H) 5 - 25 mg/dL    Creatinine 0.98 0.60 - 1.30 mg/dL    Glucose 146 (H) 65 - 140 mg/dL    Calcium 9.5 8.4 - 10.2 mg/dL    Corrected Calcium 10.1 8.3 - 10.1 mg/dL    AST 18 13 - 39 U/L    ALT 18 7 - 52 U/L    Alkaline Phosphatase 56 34 - 104 U/L    Total Protein 6.1 (L) 6.4 - 8.4 g/dL    Albumin 3.2 (L) 3.5 - 5.0 g/dL    Total Bilirubin 0.33 0.20 - 1.00 mg/dL    eGFR 58 ml/min/1.73sq m       XR chest 1 view portable    Result Date: 11/25/2023  Narrative: CHEST INDICATION:   shortness of breath with desaturation. . COMPARISON: CXR and chest CT 11/22/2023. EXAM PERFORMED/VIEWS:  XR CHEST PORTABLE. FINDINGS: Tracheostomy. Right port in lower SVC. Mild cardiomegaly. Moderate pulmonary edema and emphysema. No definite effusion.  No pneumothorax. Upper abdomen normal. Bones normal for age. Impression: Moderate pulmonary edema. Pneumonia not excluded in the appropriate clinical setting. Workstation performed: [id number]     CT chest wo contrast    Result Date: 11/22/2023  Narrative: CT CHEST WITHOUT IV CONTRAST INDICATION:   Dyspnea, chronic, chest wall or pleura disease suspected shortness of breath. COMPARISON: Compared with 11/3/2023 TECHNIQUE: CT examination of the chest was performed without intravenous contrast. Multiplanar 2D reformatted images were created from the source data. This examination, like all CT scans performed in the [facility], was performed utilizing techniques to minimize radiation dose exposure, including the use of iterative reconstruction and automated exposure control. Radiation dose length product (DLP) for this visit:  318 mGy-cm FINDINGS: LUNGS: Moderate to severe emphysematous changes. Patchy parenchymal opacity in the lingula and left lower lobe posteriorly. Decreased left lung volume. There is no tracheal or endobronchial lesion. Tracheotomy tube seen. PLEURA:  Unremarkable. HEART/GREAT VESSELS: Heart is unremarkable for patient's age. No thoracic aortic aneurysm. Dilated main pulmonary artery measuring 4.3 cm. MEDIASTINUM AND PRASANTH: Subcentimeter right paratracheal and precarinal lymph nodes. CHEST WALL AND LOWER NECK:  Unremarkable. VISUALIZED STRUCTURES IN THE UPPER ABDOMEN: Gastrostomy tube in place with contrast opacified balloon in the lumen. Simple right renal cyst. OSSEOUS STRUCTURES: Lytic area in the T12 vertebral body along the inferior endplate is unchanged. Lucent focus in the left glenoid unchanged. Impression: Breathing motion artifacts and emphysematous changes limit evaluation. Infiltrates in the lingula and left lower lobe suggested. Improved from prior study.  Workstation performed: [id number]     XR chest portable ICU    Result Date: 11/22/2023  Narrative: CHEST INDICATION:   hypoxia. COMPARISON: Chest x-ray November 20, 2023 EXAM PERFORMED/VIEWS:  XR CHEST PORTABLE ICU FINDINGS: Tracheostomy tube is in satisfactory position. Right central venous catheter terminates in the superior vena cava. Cardiomegaly is demonstrated. Stable interstitial opacities are seen which could reflect edema or interstitial infiltrate with patchy opacities in the lower lobes, worse on the left. Bony structures are unchanged     Impression: Stable exam. Workstation performed: [id number]     XR chest portable ICU    Result Date: 11/21/2023  Narrative: CHEST INDICATION:  Status post trach and vent at night, SOB. COMPARISON: 11/10/2023, CT chest 11/3/2023 EXAM PERFORMED/VIEWS:  XR CHEST PORTABLE ICU FINDINGS: -Tracheostomy tube projects in satisfactory radiographic position. -Right IJ central line tip terminates in the region of the SVC. Cardiomediastinal silhouette appears stable. Diffuse interstitial and groundglass opacities throughout the lungs, unchanged. No pneumothorax. Osseous structures appear within normal limits for patient age. Impression: Diffuse interstitial and groundglass opacities throughout the lungs, unchanged. Findings could be infectious/inflammatory and/or secondary to edema. Workstation performed: [id number]     Echo follow up/limited w/ contrast if indicated    Result Date: 11/21/2023  Narrative:   Left Ventricle: Left ventricular cavity size is normal. Wall thickness is severely increased. The left ventricular ejection fraction is 65%. Systolic function is normal. Wall motion is normal. Diastolic function is abnormal.  Left atrial filling pressure is elevated. Right Ventricle: Right ventricular cavity size is normal. Systolic function is normal.   Atrial Septum: No patent foramen ovale detected, confirmed at rest and with provcation using agitated saline contrast.   Mitral Valve: There is moderate regurgitation.  There is systolic anterior motion of the anterior leaflet without late peaking gradient at rest.   Pulmonic Valve: There is mild regurgitation. CT soft tissue neck w contrast    Result Date: 11/15/2023  Narrative: CT NECK WITH CONTRAST INDICATION:   Head/neck cancer, assess treatment response Assess response to chemo in terms of size of malignant neck mass. History of B-cell lymphoma. COMPARISON: 10/13/2023 TECHNIQUE:  Axial, sagittal, and coronal 2D reformatted images were created from the axial source data and submitted for interpretation. Radiation dose length product (DLP) for this visit:  524 mGy-cm . This examination, like all CT scans performed in the [facility], was performed utilizing techniques to minimize radiation dose exposure, including the use of iterative reconstruction and automated exposure control. IV Contrast:  85 mL of iohexol (OMNIPAQUE) IMAGE QUALITY:  Diagnostic. FINDINGS: VISUALIZED BRAIN PARENCHYMA:  No acute intracranial pathology of the visualized brain parenchyma. VISUALIZED ORBITS: Normal visualized orbits. PARANASAL SINUSES: There are retention cyst versus polyps within the sphenoid sinuses. Small amount of fluid in the left mastoid tip. NASAL CAVITY AND NASOPHARYNX: There has been interval decrease in size of the mass centered within the left nasopharynx. There is persistent but decreased effacement of the left fossa of Rosenmuller as well as eustachian tube orifice. Disease extends inferiorly along the left lateral oropharyngeal wall as well as along the left parapharyngeal space. For reference the ill-defined mass measures approximately 2.5 x 3.0 cm on series 2, image 22 and previously measured approximately 3.6 x 3.5 cm when measured at the same level on the prior exam. There is improved extension of disease into the left infratemporal fossa and  space as well as prevertebral space. Persistent fat planes along the expected course of V3 as it exits foramen ovale.  No  definite asymmetric widening of foramen ovale or regional erosive change. Soft tissue extends posterolaterally with persistent encasement of the left internal carotid artery however overall improved. SUPRAHYOID NECK: Please see above. INFRAHYOID NECK:  Aryepiglottic folds and piriform sinuses are normal.  Normal glottis and subglottic airway. THYROID GLAND:  Unremarkable. PAROTID AND SUBMANDIBULAR GLANDS:  Normal. LYMPH NODES: Please see above. No new adenopathy is identified. VASCULAR STRUCTURES: Partially imaged right-sided Mediport. There is thrombus noted within the right internal jugular vein just above the Mediport entrance site into the internal jugular vein. THORACIC INLET: Status post tracheostomy. Emphysema. Improved previously seen consolidation and layering effusions. BONY STRUCTURES: No acute fracture or destructive osseous lesion. Impression: Improved ill-defined mass centered along the left nasopharynx and oropharynx with regional extension of disease as described above. Findings consistent with the clinical history of lymphoma. No new suspicious adenopathy identified. Status post right-sided Mediport placement. Nonocclusive thrombus is seen within the right internal jugular vein just above the entrance site of the Mediport catheter into the internal jugular vein. Improved previously seen consolidation and effusion in the visualized lung fields. The study was marked in [facility] for immediate notification. Workstation performed: [id number]     FL barium swallow video w speech    Result Date: 11/14/2023  Narrative: A video barium swallow study was performed by the Department of Speech Pathology. Please refer to the report for the official interpretation. The images are stored for archival purposes only. Study images were not formally reviewed by the Radiology Department. XR chest portable ICU    Result Date: 11/11/2023  Narrative: CHEST INDICATION:   Desatruation and hypoxia, evaluation for intratrhoacic pathology.  COMPARISON: 11/2/2023 EXAM PERFORMED/VIEWS:  XR CHEST PORTABLE ICU FINDINGS: Tracheostomy tube tip terminates approximately 1 cm from sujata. Right  sided central venous catheter tip near cavoatrial junction. Persistent dense  left  lung base opacity may represent a combination of effusion and parenchymal opacity. Improved diffuse bilateral hazy/groundglass opacity. No pneumothorax. Stable cardiomediastinal silhouette. No large right effusion. Impression: Persistent dense  left  lung base opacity may represent a combination of effusion and parenchymal opacity. Improved diffuse bilateral hazy/groundglass opacity. Workstation performed: [id number]     Bronchoscopy    Result Date: 11/6/2023  Narrative: Table formatting from the original result was not included. [address] [place] [address] [phone] DATE OF SERVICE: 11/06/23 PHYSICIAN(S): Attending: Nba Matias MD Fellow: Bob Peñaloza MD INDICATION: Acute respiratory failure with hypoxia ([address]) POST-OP DIAGNOSIS: See the impression below. PREPROCEDURE: Standard airway preparation completed per respiratory therapy protocol. Informed consent was obtained. Images reviewed prior to the procedure. A Time Out was performed. No suspicion or identified risk for TB or other airborne infectious disease; bronchoscopy procedure being performed for diagnostic purposes. PROCEDURE: Bronchoscopy DETAILS OF PROCEDURE: Procedure was performed at bedside in the intensive care unit. A time out was performed to confirm correct patient and correct procedure. The patient underwent moderate sedation, which was administered by an intensivist. The patient's blood pressure, ECG, heart rate, oxygen and respirations were monitored throughout the procedure. The patient experienced no blood loss. The scope was introduced through the tracheostomy tube. The procedure was not difficult. The patient tolerated the procedure well.  There were no apparent adverse events. ANESTHESIA INFORMATION: ASA: ASA status not filed in the log. Anesthesia Type: Anesthesia type not filed in the log.  FINDINGS: The upper trachea, middle trachea, lower trachea, main sujata, left main stem, left apical-posterior segment (LB1+2), left upper anterior segment (LB3), superior lingular segment (LB4), inferior lingular segment (LB5), left superior segment (LB6), left anterior basal segment (LB7+8), left lateral basal segment (LB9), left posterior basal segment (LB10), right main stem, right apical segment (RB1), right posterior segment (RB2), right anterior segment (RB3), bronchus intermedius, right lateral segment (RB4), right medial segment (RB5), right anterior basal segment (RB8), right lateral basal segment (RB9) and right posterior basal segment (RB10) appeared normal. Bronchoalveolar lavage was performed x3 in the right medial segment (RB5) with 180 mL of saline instilled and a total return of 60 mL; the fluid appeared cloudy Minimal, thin and clear secretions present in the left apical-posterior segment (LB1+2), left upper anterior segment (LB3), superior lingular segment (LB4), inferior lingular segment (LB5), left superior segment (LB6), left anterior basal segment (LB7+8), left lateral basal segment (LB9), left posterior basal segment (LB10), right lateral segment (RB4), right medial segment (RB5), right superior segment (RB6), right medial basal segment (RB7), right anterior basal segment (RB8), right lateral basal segment (RB9) and right posterior basal segment (RB10); secretions were easily removed by therapeutic aspiration and the airway was cleared; performed washing, sample sent for cytology and microbiology analysis Performed 1 brushing with cytology and microbiology brushes in the right superior segment (RB6) SPECIMENS: ID Type Source Tests Collected by Time Destination 1 :  Washing Lung, Right Washing PULMONARY CYTOLOGY Emilia Ferro MD 11/6/2023 2026  2 :  Lavage Lung, Right Middle Lobe Bronchoalveolar Lavage PULMONARY CYTOLOGY Priscilla Shah MD 11/6/2023 1415  A :  Bronchial Lung, Right Middle Lobe Bronchoalveolar Lavage FUNGAL CULTURE, VIRUS CULTURE, BRONCHIAL CULTURE AND GRAM STAIN, LEGIONELLA CULTURE, PNEUMOCYSTIS SMEAR BY DFA ([facility]), AFB CULTURE WITH STAIN Priscilla Shah MD 11/6/2023 1411  B :  Bronchial Lung, Right Lower Lobe Bronchial Washing FUNGAL CULTURE, VIRUS CULTURE, BRONCHIAL CULTURE AND GRAM STAIN, LEGIONELLA CULTURE, PNEUMOCYSTIS SMEAR BY DFA ([facility]), AFB CULTURE WITH STAIN Priscilla Shah MD 11/6/2023 1421       Impression: Thick secretions in the inner canula of the tracheostomy. This was replaced for a new inner canula. Minimal amount of clear, thin secretions in bilateral airways. No evidence of mucous plug. No evidence of endobronchial lesions or obstructions. BAL of RML performed as well as brushing of RB-6. RECOMMENDATION: Follow-up all cultures and cytology from wash and from BAL Follow up culture results from [facility] Continue management in the intensive care unit. Can resume tube feeds and PO diet once awake. Tia Sin MD Pulmonary and Critical Care Fellow, PGY-5 [facility] I personally supervised, assisted in, and/or performed the entirety of this procedure. Denisa Fitzgerald MD     CT chest wo contrast    Result Date: 11/4/2023  Narrative: CT CHEST WITHOUT IV CONTRAST INDICATION:   hypoxia. Acute hypoxic respiratory failure. Recently diagnosed large B-cell lymphoma. COMPARISON: 10/12/2023 TECHNIQUE: CT examination of the chest was performed without intravenous contrast. Multiplanar 2D reformatted images were created from the source data. This examination, like all CT scans performed in the [facility], was performed utilizing techniques to minimize radiation dose exposure, including the use of iterative reconstruction and automated exposure control.  Radiation dose length product (DLP) for this visit:  56 mGy-cm FINDINGS: LUNGS: Pulmonary emphysema noted, with superimposed diffuse alveolar and interstitial airspace opacities. Compared to the most recent examination, little change has occurred in the upper lung fields. There is partially diminished atelectasis at the lung bases, possibly related to the diminished size of the effusions. When the lung disease is compared to earlier studies such as 9/13/2023, the process remains significantly worsened PLEURA: Trace right effusion, but overall diminished size of the pleural effusions. No pneumothorax. HEART/GREAT VESSELS: No pericardial effusion. Cardiomegaly. Significant enlargement of the main pulmonary artery, diameter 4.7 cm earlier 3.9 cm. Central line tip is seen in the upper right atrium. MEDIASTINUM AND PRASANTH: No progressive adenopathy. Tracheotomy tube positioning appropriate. CHEST WALL AND LOWER NECK:  Unremarkable. VISUALIZED STRUCTURES IN THE UPPER ABDOMEN: Partial visualization of a incompletely assessed hypodense right kidney upper pole nodule OSSEOUS STRUCTURES: Chronic but enlarged (since 2020) lucent defect in the T12 vertebral body with pathologic fracture. Comparing to 8/25/2023, no change     Impression: 1. Persistent severe bilateral alveolar and interstitial opacities superimposed upon pulmonary emphysema. For the most part, this is unchanged from the most recent prior study although there is somewhat less atelectasis at the lung bases perhaps related to diminished size of previously seen pleural effusions. When the airspace disease is compared to earlier examinations for example 9/13/2023, significant worsening is again noted 2. No pneumothorax 3. Markedly enlarged main pulmonary artery consistent with pulmonary arterial hypertension. Workstation performed: [id number]     XR chest portable ICU    Result Date: 11/3/2023  Narrative: CHEST INDICATION:   hypoxia.  COMPARISON: 10/29/2023 EXAM PERFORMED/VIEWS:  XR CHEST PORTABLE ICU Images: 2 FINDINGS: Cardiomediastinal silhouette appears enlarged. Tracheostomy tube is midline. Right-sided central catheter unchanged. Diffuse abnormal interstitial and alveolar disease appears slightly worse. No pneumothorax. No pleural effusion. Osseous structures appear within normal limits for patient age. Impression: Diffuse abnormal interstitial and alveolar disease appears worse. Correlate for multifocal pneumonia, heart failure, ARDS. Workstation performed: [id number]     XR chest portable    Result Date: 10/30/2023  Narrative: CHEST INDICATION:   hypoxia. COMPARISON: 10/27/2023 EXAM PERFORMED/VIEWS:  XR CHEST PORTABLE FINDINGS: Right  sided central venous catheter tip near superior cavoatrial junction. Tracheostomy tube noted. Persistent dense  left  lung base opacity may represent a combination of effusion and parenchymal opacity. Persistent bilateral diffuse interstitial and hazy airspace/groundglass opacity. No pneumothorax. Stable cardiomediastinal silhouette. Impression: Persistent dense  left  lung base opacity may represent a combination of effusion and parenchymal opacity. Persistent bilateral diffuse interstitial and hazy airspace/groundglass opacity. Workstation performed: [id number]       Labs and pertinent reports reviewed. This note has been generated by voice recognition software system. Therefore, there may be spelling, grammar, and or syntax errors. Please contact if questions arise.     Kayla Wilkins MD, PhD

## 2023-11-28 NOTE — ASSESSMENT & PLAN NOTE
Lab Results   Component Value Date    EGFR 58 11/28/2023    EGFR 48 11/27/2023    EGFR 38 11/26/2023    CREATININE 0.98 11/28/2023    CREATININE 1.15 11/27/2023    CREATININE 1.38 (H) 11/26/2023     Baseline Cr between 0.75-1.1  Initial PO felt 2/2 prerenal azotemia 2/2 diuresis, reduced PO intake +/- ATN. Patient received 2 doses of Bumex IV 2 g as she had not been responding appropriately to IV 40 Lasix daily. Creatinine went up by 0.5 meeting criteria for an PO. Suspect most likely due to medications as a combination of prerenal and intrinsic. FENa was 0.2%, UA shows no casts or signs of infection, urine eosinophils 0%. Patient likely has prerenal PO from volume depletion. Received 2 L NS and 1 pRBC and cr went up by 0.07 still and Na and Cl went down, suspect that volume prevented further cr rise and free water excretion impaired by kidney function, allowing hyponatremia to increase. Suspect that now that nephrotoxic medications have been stopped she responded well to Lasix and creatinine downtrended. PO now resolved. Will be cautious about nephrotoxins and monitor as restarting EPOCH and ppx. Patient gets volume depleted and overloaded very easily. Especially from chemo. Plan:   Continue to monitor UO/renal function. Avoid nephrotoxic agents  Follow up with BMP in the morning   Continue to monitor a.m. BMP.

## 2023-11-28 NOTE — PLAN OF CARE
Problem: MOBILITY - ADULT  Goal: Maintain or return to baseline ADL function  Description: INTERVENTIONS:  -  Assess patient's ability to carry out ADLs; assess patient's baseline for ADL function and identify physical deficits which impact ability to perform ADLs (bathing, care of mouth/teeth, toileting, grooming, dressing, etc.)  - Assess/evaluate cause of self-care deficits   - Assess range of motion  - Assess patient's mobility; develop plan if impaired  - Assess patient's need for assistive devices and provide as appropriate  - Encourage maximum independence but intervene and supervise when necessary  - Involve family in performance of ADLs  - Assess for home care needs following discharge   - Consider OT consult to assist with ADL evaluation and planning for discharge  - Provide patient education as appropriate  Outcome: Progressing  Goal: Maintains/Returns to pre admission functional level  Description: INTERVENTIONS:  - Perform BMAT or MOVE assessment daily.   - Set and communicate daily mobility goal to care team and patient/family/caregiver.    - Collaborate with rehabilitation services on mobility goals if consulted  - Out of bed for toileting  - Record patient progress and toleration of activity level   Outcome: Progressing     Problem: Prexisting or High Potential for Compromised Skin Integrity  Goal: Skin integrity is maintained or improved  Description: INTERVENTIONS:  - Identify patients at risk for skin breakdown  - Assess and monitor skin integrity  - Assess and monitor nutrition and hydration status  - Monitor labs   - Assess for incontinence   - Turn and reposition patient  - Assist with mobility/ambulation  - Relieve pressure over bony prominences  - Avoid friction and shearing  - Provide appropriate hygiene as needed including keeping skin clean and dry  - Evaluate need for skin moisturizer/barrier cream  - Collaborate with interdisciplinary team   - Patient/family teaching  - Consider wound care consult   Outcome: Progressing     Problem: Nutrition/Hydration-ADULT  Goal: Nutrient/Hydration intake appropriate for improving, restoring or maintaining nutritional needs  Description: Monitor and assess patient's nutrition/hydration status for malnutrition. Collaborate with interdisciplinary team and initiate plan and interventions as ordered. Monitor patient's weight and dietary intake as ordered or per policy. Utilize nutrition screening tool and intervene as necessary. Determine patient's food preferences and provide high-protein, high-caloric foods as appropriate.      INTERVENTIONS:  - Monitor oral intake, urinary output, labs, and treatment plans  - Assess nutrition and hydration status and recommend course of action  - Evaluate amount of meals eaten  - Assist patient with eating if necessary   - Allow adequate time for meals  - Recommend/ encourage appropriate diets, oral nutritional supplements, and vitamin/mineral supplements  - Order, calculate, and assess calorie counts as needed  - Recommend, monitor, and adjust tube feedings and TPN/PPN based on assessed needs  - Assess need for intravenous fluids  - Provide specific nutrition/hydration education as appropriate  - Include patient/family/caregiver in decisions related to nutrition  Outcome: Progressing     Problem: PAIN - ADULT  Goal: Verbalizes/displays adequate comfort level or baseline comfort level  Description: Interventions:  - Encourage patient to monitor pain and request assistance  - Assess pain using appropriate pain scale  - Administer analgesics based on type and severity of pain and evaluate response  - Implement non-pharmacological measures as appropriate and evaluate response  - Consider cultural and social influences on pain and pain management  - Notify physician/advanced practitioner if interventions unsuccessful or patient reports new pain  Outcome: Progressing     Problem: INFECTION - ADULT  Goal: Absence or prevention of progression during hospitalization  Description: INTERVENTIONS:  - Assess and monitor for signs and symptoms of infection  - Monitor lab/diagnostic results  - Monitor all insertion sites, i.e. indwelling lines, tubes, and drains  - Monitor endotracheal if appropriate and nasal secretions for changes in amount and color  - [place] appropriate cooling/warming therapies per order  - Administer medications as ordered  - Instruct and encourage patient and family to use good hand hygiene technique  - Identify and instruct in appropriate isolation precautions for identified infection/condition  Outcome: Progressing  Goal: Absence of fever/infection during neutropenic period  Description: INTERVENTIONS:  - Monitor WBC    Outcome: Progressing     Problem: SAFETY ADULT  Goal: Maintain or return to baseline ADL function  Description: INTERVENTIONS:  -  Assess patient's ability to carry out ADLs; assess patient's baseline for ADL function and identify physical deficits which impact ability to perform ADLs (bathing, care of mouth/teeth, toileting, grooming, dressing, etc.)  - Assess/evaluate cause of self-care deficits   - Assess range of motion  - Assess patient's mobility; develop plan if impaired  - Assess patient's need for assistive devices and provide as appropriate  - Encourage maximum independence but intervene and supervise when necessary  - Involve family in performance of ADLs  - Assess for home care needs following discharge   - Consider OT consult to assist with ADL evaluation and planning for discharge  - Provide patient education as appropriate  Outcome: Progressing  Goal: Maintains/Returns to pre admission functional level  Description: INTERVENTIONS:  - Perform BMAT or MOVE assessment daily.   - Set and communicate daily mobility goal to care team and patient/family/caregiver.    - Collaborate with rehabilitation services on mobility goals if consulted  - Out of bed for toileting  - Record patient progress and toleration of activity level   Outcome: Progressing  Goal: Patient will remain free of falls  Description: INTERVENTIONS:  - Educate patient/family on patient safety including physical limitations  - Instruct patient to call for assistance with activity   - Consult OT/PT to assist with strengthening/mobility   - Keep Call bell within reach  - Keep bed low and locked with side rails adjusted as appropriate  - Keep care items and personal belongings within reach  - Initiate and maintain comfort rounds  - Make Fall Risk Sign visible to staff  - Apply yellow socks and bracelet for high fall risk patients  - Consider moving patient to room near nurses station  Outcome: Progressing     Problem: DISCHARGE PLANNING  Goal: Discharge to home or other facility with appropriate resources  Description: INTERVENTIONS:  - Identify barriers to discharge w/patient and caregiver  - Arrange for needed discharge resources and transportation as appropriate  - Identify discharge learning needs (meds, wound care, etc.)  - Arrange for interpretive services to assist at discharge as needed  - Refer to Case Management Department for coordinating discharge planning if the patient needs post-hospital services based on physician/advanced practitioner order or complex needs related to functional status, cognitive ability, or social support system  Outcome: Progressing     Problem: Knowledge Deficit  Goal: Patient/family/caregiver demonstrates understanding of disease process, treatment plan, medications, and discharge instructions  Description: Complete learning assessment and assess knowledge base.   Interventions:  - Provide teaching at level of understanding  - Provide teaching via preferred learning methods  Outcome: Progressing     Problem: RESPIRATORY - ADULT  Goal: Achieves optimal ventilation and oxygenation  Description: INTERVENTIONS:  - Assess for changes in respiratory status  - Assess for changes in mentation and behavior  - Position to facilitate oxygenation and minimize respiratory effort  - Oxygen administered by appropriate delivery if ordered  - Initiate smoking cessation education as indicated  - Encourage broncho-pulmonary hygiene including cough, deep breathe, Incentive Spirometry  - Assess the need for suctioning and aspirate as needed  - Assess and instruct to report SOB or any respiratory difficulty  - Respiratory Therapy support as indicated  Outcome: Progressing     Problem: GENITOURINARY - ADULT  Goal: Maintains or returns to baseline urinary function  Description: INTERVENTIONS:  - Assess urinary function  - Encourage oral fluids to ensure adequate hydration if ordered  - Administer IV fluids as ordered to ensure adequate hydration  - Administer ordered medications as needed  - Offer frequent toileting  - Follow urinary retention protocol if ordered  Outcome: Progressing  Goal: Absence of urinary retention  Description: INTERVENTIONS:  - Assess patient’s ability to void and empty bladder  - Monitor I/O  - Bladder scan as needed  - Discuss with physician/AP medications to alleviate retention as needed  - Discuss catheterization for long term situations as appropriate  Outcome: Progressing     Problem: METABOLIC, FLUID AND ELECTROLYTES - ADULT  Goal: Electrolytes maintained within normal limits  Description: INTERVENTIONS:  - Monitor labs and assess patient for signs and symptoms of electrolyte imbalances  - Administer electrolyte replacement as ordered  - Monitor response to electrolyte replacements, including repeat lab results as appropriate  - Instruct patient on fluid and nutrition as appropriate  Outcome: Progressing  Goal: Fluid balance maintained  Description: INTERVENTIONS:  - Monitor labs   - Monitor I/O and WT  - Instruct patient on fluid and nutrition as appropriate  - Assess for signs & symptoms of volume excess or deficit  Outcome: Progressing  Goal: Glucose maintained within target range  Description: INTERVENTIONS:  - Monitor Blood Glucose as ordered  - Assess for signs and symptoms of hyperglycemia and hypoglycemia  - Administer ordered medications to maintain glucose within target range  - Assess nutritional intake and initiate nutrition service referral as needed  Outcome: Progressing

## 2023-11-28 NOTE — CASE MANAGEMENT
Case Management Progress Note    Patient name Claire Ray  Location ICU 03/ICU 03 MRN [id number]  : 1955 Date 2023       LOS (days): 68  Geometric Mean LOS (GMLOS) (days): 26.10  Days to GMLOS:-49.8        OBJECTIVE:        Current admission status: Inpatient  Preferred Pharmacy:   [facility] - [address] [facility]. [address] [facility] [address] [facility] [address] [facility] [place] [address]  Phone: [phone] Fax: [phone] [facility] [facility] [place] [place] [address] [address]  Phone: [phone] Fax: [phone]    Primary Care Provider: Christian Bowie MD    Primary Insurance: [place]  Secondary Insurance: [facility]    PROGRESS NOTE:    CM spoke with [place]. Home inspection completed on . As per Pk Gomez inspection went well. Pk Gomez reports that an RT from [address] [place] [address] will be at the hospital tomorrow  to trial pt on the home Vent. Pk Gomez requesting [place] information - CM sent to Pk Gomez.

## 2023-11-28 NOTE — ASSESSMENT & PLAN NOTE
Wt Readings from Last 3 Encounters:   11/28/23 76.2 kg (167 lb 15.9 oz)   09/07/23 74.6 kg (164 lb 6.4 oz)   08/30/23 73.3 kg (161 lb 9.6 oz)     Lab Results   Component Value Date    LVEF 65 11/21/2023     (H) 10/28/2023     (H) 09/29/2023     Echo 11/21/23: LVEF 65%.        Plan:  K > 4   Measure I/O

## 2023-11-28 NOTE — PROGRESS NOTES
[id number]  Progress Note  Name: Chanel Kumar  MRN: [id number]  Unit/Bed#: ICU 03 I Date of Admission: 9/13/2023   Date of Service: 11/28/2023 I Hospital Day: 68    Assessment/Plan   * Acute respiratory failure with hypoxia secondary to oropharyngeal mass  Assessment & Plan  Cuffless trach placed on 9/17. Transitioned to cuffed trach on 10/3. Respiratory breathing has been challenging in the setting of anxiety given recent events. Repeat imaging does not show improvement in bilateral consolidation or atelectasis and groundglass opacities on repeat. S/p bronch. Bronchial cx with 3 colonies CoNS. Completed 7 day course of IV abx w/ cefepime and Vancomycin. Bronchial culture and gram stain negative. Micro negative for CMV, AFB, Fungitell, Pneumocystis jiroveci (carinii), Histoplasma, Aspergillus. Trach secretions sent for sputum culture shows 2+ polys, 1+ gram-positive cocci in pairs, chains and clusters and 1+ gram-negative rods. Cuffless trach was placed 9/17. Replaced with a cuffed Shiley XLT #7 10/3. Has been tolerating nightly BiPAP via trach w/ settings EPAP 8, PSV 4. Etiology: Consider drug induced pneumonitis in the setting of chemo 2/2 newly dx lymphoma. CT chest showed Infiltrates in the lingula and left lower lobe. Improved from prior study   Working with case management to qualify patient for home vent. Plan:  Continue w/ BIPAP via trach at night. EPAP 8, PSV 4. Continue to titrate O2 to maintain SpO2 >85%  Aggressive pulmonary toilet   Incentive Spirometry   Suctioning via trach if patient is desatting and unable to expectorate phlegm. Continue w/ guaifenesin, Atrovent and Xopenex for airway maintenance. Continue inhalation treatment with Pulmicort and formoterol q12 , duonebs QID, atrovent and xopenex   Regular diet  Daily PT/OT    Large cell lymphoma (HCC)  Assessment & Plan  Large B-cell lymphoma, stage II. First chemo cycle 9/22 4 days of R-EPOCH. 9/27 Rituxan. 9/28 Filgrastim. Received nivestym 300 mc 7 days dcd on 10/26   Discontinue Prophylaxis as per Heme Oncology recommendations  Pt had hyperkalemia of 5.5, can be due to Bactrim, consider switching to atovaquone      9/22  4 days of R-EPOCH.  9/27 Rituxan  10/13 for [facility] cycle 2, which was done over 4 days and cytoxan on 10/19.  11/6 completed R-EPOCH cycle 3 and Cytoxan 11/7. Plan:  Continue with current chemo treatment 11/24-11/28. Continue Bactrim, Acyclovir, Allopurinol for prophylaxis   Transfuse blood products as needed. Keep Hgb>7 and Plt>20 for chemo   See acute respiratory failure above    Acute embolism and thrombosis of right internal jugular vein (HCC)  Assessment & Plan  CT soft tissues of the neck: Nonocclusive thrombus noted within the right internal jugular vein just above the Mediport entrance site into the internal jugular vein. No signs of pain, headaches, vision changes. Plan:  Continue w/ home Eliquis 5 mg     Oropharyngeal mass  Assessment & Plan  9/14 Neck/ soft tissue CT: Large enhancing soft tissue mass identified within the left neck involving multiple spaces. Centered within the left oropharynx with extensions as described above into multiple spaces. This results in significant airway compromise. 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 Small level 3/level 4 nodes are seen within the neck. Tracheostomy by ENT. Replaced on 9/26. H/o tobacco abuse, daily smoker. On nicotine while inpatient, confirmed with patient she needs nicotine patch. CT soft tissue neck shows reduced mass effect from 9/14 to 10/13. Can consider reducing trach size if continues to improve. Patient often refusing peg tube feeds but is feeding herself small meals orally. Plan:  ENT and heme onc following  Pt receiving her 4th cycle of chemotherapy - Day 5 (11/28)  As per speech therapist diet  Dysphagia 2. Continue parallel PEG tube feeds for nutrition.    See acute respiratory failure and large B cell lymphoma above. Pancytopenia due to chemotherapy [facility])  Assessment & Plan  Recent Labs     11/26/23  0639 11/27/23  0640 11/28/23  0524   HGB 10.5* 10.2* 9.7*       '  Patient received 1 PRBC transfusion on 10/5 and 10/25. Her B/L hemoglobin is 12-14. No sites of bleeding, no black stools. Iron panel indicative of inflammatory anemia. Normal Vitamin B12 levels. Folate low at 5.5. Filgrastim 4 doses administered by heme/onc. Hb declined from 9.9 to 7.4, pt received 1 PRBC transfusion (11/24)    Plan:  Trend hemoglobin and transfuse for <7. As per heme/onc transfuse irradiated and leukoreduced blood products. Trend platelets  Folic acid 1mg daily supplementation  As per conversation with Heme/oncology attending, patient pancytopenia associated with chemotherapy is expected/predictable. No further anemia w/u required. Low ANC management as per heme/onc    Blister (nonthermal) of left forearm, sequela  Assessment & Plan  Blisters of left upper extremity healing, no signs of infection, continue daily wound care. (HFpEF) heart failure with preserved ejection fraction [facility])  Assessment & Plan  Wt Readings from Last 3 Encounters:   11/28/23 76.2 kg (167 lb 15.9 oz)   09/07/23 74.6 kg (164 lb 6.4 oz)   08/30/23 73.3 kg (161 lb 9.6 oz)     Lab Results   Component Value Date    LVEF 65 11/21/2023     (H) 10/28/2023     (H) 09/29/2023     Echo 11/21/23: LVEF 65%. Plan:  K > 4   Measure I/O    Ambulatory dysfunction  Assessment & Plan  2/2 Impacted by chronic pain syndrome + prolonged hospital stay. Continue OOB with PT. PT rec post acute rehab however reportedly Cannot get LTACH placement while getting chemo per CM  She is aware STR SNFs continue to follow and treatment can be done from a SNF level of care. PT would need to be on trach collar for at least 72 hours with no need for the vent.  Aware that pt would need to be around 28% FiO2     Depression  Assessment & Plan  Patient on Zoloft 75 daily and Seroquel hs  Patient is depressed, multiple family/palliative discussions. patient is firm that she wants to live and to fight, she is just tired. Currently goal is disease treatment oriented. Full code. No SI/HI ideations. Palliative signed off, will reconsult if patient changes her mind regarding wishes. Chronic Superficial thrombophlebitis  Assessment & Plan  Right forearm soreness. RUE US: No acute or chronic deep vein thrombosis. 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 PO not severe enough to require renal dosing at this time, will continue to monitor and adjust dosing as needed. Continue DVT ppx with Eliquis     CKD (chronic kidney disease) stage 3, GFR 30-59 ml/min [facility])  Assessment & Plan  Lab Results   Component Value Date    EGFR 58 11/28/2023    EGFR 48 11/27/2023    EGFR 38 11/26/2023    CREATININE 0.98 11/28/2023    CREATININE 1.15 11/27/2023    CREATININE 1.38 (H) 11/26/2023     Baseline Cr between 0.75-1.1  Initial PO felt 2/2 prerenal azotemia 2/2 diuresis, reduced PO intake +/- ATN. Patient received 2 doses of Bumex IV 2 g as she had not been responding appropriately to IV 40 Lasix daily. Creatinine went up by 0.5 meeting criteria for an PO. Suspect most likely due to medications as a combination of prerenal and intrinsic. FENa was 0.2%, UA shows no casts or signs of infection, urine eosinophils 0%. Patient likely has prerenal PO from volume depletion. Received 2 L NS and 1 pRBC and cr went up by 0.07 still and Na and Cl went down, suspect that volume prevented further cr rise and free water excretion impaired by kidney function, allowing hyponatremia to increase. Suspect that now that nephrotoxic medications have been stopped she responded well to Lasix and creatinine downtrended. PO now resolved. Will be cautious about nephrotoxins and monitor as restarting EPOCH and ppx.  Patient gets volume depleted and overloaded very easily. Especially from chemo. Plan:   Continue to monitor UO/renal function. Avoid nephrotoxic agents  Follow up with BMP in the morning   Continue to monitor a.m. BMP. Pain syndrome, chronic  Assessment & Plan  Home regimen: Oxycodone 5 mg BID, Tylenol 650 mg q8 prn. Gabapentin 600 mg TID. Medical marijuana. Lumbar radiculopathy and impaired ambulatory dysfunction secondary to pain. Has bowel regimen and is having bowel movements daily. Patient required additional pain management during previous cycle and has some additional pain from tunneled line placement    Plan:  Continue gabapentin 300 mg TID, oxycodone 5 mg TID, prn Tylenol 650 mg q6. Oxycodone 5mg every 8 hours  Oxycodone 2.5 mg q6 PRN    Benign essential hypertension  Assessment & Plan  Home regimen Coreg 12.5 mg BID, Amlodipine 10 mg daily, and lisinopril 40 mg. All discontinued at this time secondary to hypotension and PO since cycle 1. but stable currently without any antihypertensives. Systolic persistently elevated and tachycardic, potentially secondary to pain. Patient was more hypotensive during last chemo. Coreg contraindicated during chemo, since cycles are every other week, would be better to stick with lopressor during duration of therapy as opposed to switching constantly. Plan:  Monitor vitals. Continue Norvasc 10 and lopressor 25 bid    Protein-calorie malnutrition ([address])  Assessment & Plan  Malnutrition Findings:   Adult Malnutrition type: Chronic illness  Adult Degree of Malnutrition: Unspecified protein calorie malnutrition  Malnutrition Characteristics: Inadequate energy, Other (comment) (stress of illness)                  360 Statement: Protein calorie malnutrition r/t inadequate intake as evidenced by >7 day period of poor intakes and stress of infection; currently treated with regular diet and nutrition supplements    BMI Findings: Body mass index is 32.81 kg/m².                 Disposition: Stepdown Level 1    ICU Core Measures     Vented Patient  VAP Bundle  VAP bundle ordered     A: Assess, Prevent, and Manage Pain Has pain been assessed? Yes  Need for changes to pain regimen? No   B: Both Spontaneous Awakening Trials (SATs) and Spontaneous Breathing Trials (SBTs) Plan to perform spontaneous awakening trial today? N/A   Plan to perform spontaneous breathing trial today? N/A   Obvious barriers to extubation? NA   C: Choice of Sedation RASS Goal: 0 Alert and Calm  Need for changes to sedation or analgesia regimen? No   D: Delirium CAM-ICU: Negative   E: Early Mobility  Plan for early mobility? NA   F: Family Engagement Plan for family engagement today? NA       Antibiotic Review: Patient on appropriate coverage based on culture data. Review of Invasive Devices:      Central access plan: Medications requiring central line      Prophylaxis:  VTE VTE covered by:  apixaban, Oral, 5 mg at 11/27/23 2118       Stress Ulcer  covered byfamotidine (PEPCID) tablet 20 mg [[id number]]         Significant 24hr Events     24hr events: No events overnight, Pt tolerated pressure support ventilation overnight. Subjective  Review of Systems   Constitutional:  Positive for activity change. HENT:  Positive for voice change. Negative for sore throat. Eyes: Negative. Respiratory: Negative. Negative for cough, choking and shortness of breath. Cardiovascular: Negative. Gastrointestinal: Negative. Endocrine: Negative. Genitourinary: Negative. Musculoskeletal: Negative. Skin: Negative. Allergic/Immunologic: Negative. Neurological: Negative. Negative for dizziness, speech difficulty and light-headedness. Hematological: Negative. Psychiatric/Behavioral: Negative.            Objective                            Vitals I/O      Most Recent Min/Max in 24hrs   Temp 98.5 °F (36.9 °C) Temp  Min: 98.1 °F (36.7 °C)  Max: 99 °F (37.2 °C)   Pulse 81 Pulse  Min: 71  Max: 99   Resp 17 Resp  Min: 12 Max: 17   /78 BP  Min: 116/56  Max: 143/83   O2 Sat 93 % SpO2  Min: 77 %  Max: 99 %      Intake/Output Summary (Last 24 hours) at 11/28/2023 0630  Last data filed at 11/28/2023 0400  Gross per 24 hour   Intake 820 ml   Output 1200 ml   Net -380 ml       Diet Regular; Regular House; Potassium 2 GM    Invasive Monitoring           Physical Exam   Physical Exam  Eyes:      General: Vision grossly intact. Extraocular Movements: Extraocular movements intact. Conjunctiva/sclera: Conjunctivae normal.      Pupils: Pupils are equal, round, and reactive to light. Skin:     General: Skin is warm. HENT:      Head: Normocephalic and atraumatic. Right Ear: Hearing normal.      Left Ear: Hearing normal.      Mouth/Throat:      Mouth: Mucous membranes are moist.   Neck:      Trachea: Tracheostomy present. Comments: tunneled DL RIJ catheter w/o purulence  Cardiovascular:      Rate and Rhythm: Normal rate and regular rhythm. Pulses: Normal pulses. Heart sounds: Normal heart sounds. Abdominal: General: Bowel sounds are normal.   Constitutional:       Appearance: She is ill-appearing. Pulmonary:      Effort: No respiratory distress. Breath sounds: Rhonchi present. Chest:      Chest wall: No tenderness. Neurological:      Mental Status: She is alert and oriented to person, place and time.             Diagnostic Studies      Imaging: No imaging done in the past 24 hrs      Medications:  Scheduled PRN   acyclovir, 400 mg, BID  allopurinol, 200 mg, 4x Daily  amLODIPine, 10 mg, Daily  apixaban, 5 mg, BID  budesonide, 0.5 mg, Q12H  busPIRone, 30 mg, TID  chlorhexidine, 15 mL, Q12H ASHLEY  cyclophosphamide (CYTOXAN) 1,350 mg in sodium chloride 0.9 % 250 mL IVPB, 750 mg/m2 (Treatment Plan Recorded), Once  DOXOrubicin (ADRIAMYCIN) 18 mg, etoposide (TOPOSAR) 67.6 mg, vinCRIStine (ONCOVIN) 0.7 mg in sodium chloride 0.9 % 500 mL infusion, , Q24H  famotidine, 20 mg, BID  fluconazole, 200 mg, Daily  folic acid, 1 mg, Daily  formoterol, 20 mcg, Q12H  gabapentin, 300 mg, TID  levalbuterol, 1.25 mg, Q6H   And  ipratropium, 0.5 mg, Q6H  levofloxacin, 250 mg, Q24H ASHLEY  melatonin, 3 mg, HS  metoprolol tartrate, 25 mg, Q12H [address]  nicotine, 7 mg, Daily  ondansetron (ZOFRAN) 16 mg, dexamethasone (DECADRON) 10 mg in sodium chloride 0.9 % 50 mL IVPB, , Q24H  oxyCODONE, 5 mg, Q8H  polyethylene glycol, 17 g, Daily  potassium chloride, 40 mEq, Once  predniSONE, 60 mg/m2 (Treatment Plan Recorded), BID With Meals  QUEtiapine, 50 mg, HS  senna, 17.6 mg, BID  sertraline, 75 mg, Daily  sodium chloride, 2,000 mL, Once  sulfamethoxazole-trimethoprim, 1 tablet, Once per day on Mon Wed Fri      alteplase, 2 mg, Q1MIN PRN  alteplase, 2 mg, Q1MIN PRN  alteplase, 2 mg, Q1MIN PRN  alteplase, 2 mg, Q1MIN PRN  ondansetron, 4 mg, Q8H PRN  ondansetron, 4 mg, Q8H PRN  oxyCODONE, 2.5 mg, Q6H PRN  sodium chloride, 20 mL/hr, Daily PRN       Continuous          Labs:    CBC    Recent Labs     11/27/23 0640 11/28/23  0524   WBC 4.82 3.13*   HGB 10.2* 9.7*   HCT 32.2* 30.5*    354     BMP    Recent Labs     11/27/23  0640 11/28/23  0524   SODIUM 143 141   K 4.0 3.5    103   CO2 30 30   AGAP 7 8   BUN 31* 31*   CREATININE 1.15 0.98   CALCIUM 9.7 9.5       Coags    No recent results     Additional Electrolytes  Recent Labs     11/26/23  0639   CAIONIZED 1.31          Blood Gas    No recent results  No recent results LFTs  Recent Labs     11/27/23  0640 11/28/23  0524   ALT 14 18   AST 16 18   ALKPHOS 61 56   ALB 3.3* 3.2*   TBILI 0.33 0.33       Infectious  No recent results  Glucose  Recent Labs     11/26/23  0912 11/26/23 2035 11/27/23  0640 11/28/23  0524   GLUC 145* 201* 138 146*             Monica Chase MD

## 2023-11-28 NOTE — ASSESSMENT & PLAN NOTE
Large B-cell lymphoma, stage II. First chemo cycle 9/22 4 days of R-EPOCH. 9/27 Rituxan. 9/28 Filgrastim. Received nivestym 300 mc 7 days dcd on 10/26   Discontinue Prophylaxis as per Heme Oncology recommendations  Pt had hyperkalemia of 5.5, can be due to Bactrim, consider switching to atovaquone      9/22  4 days of R-EPOCH.  9/27 Rituxan  10/13 for [facility] cycle 2, which was done over 4 days and cytoxan on 10/19.  11/6 completed R-EPOCH cycle 3 and Cytoxan 11/7. Plan:  Continue with current chemo treatment 11/24-11/28. Continue Bactrim, Acyclovir, Allopurinol for prophylaxis   Transfuse blood products as needed.   Keep Hgb>7 and Plt>20 for chemo   See acute respiratory failure above

## 2023-11-28 NOTE — ASSESSMENT & PLAN NOTE
Malnutrition Findings:   Adult Malnutrition type: Chronic illness  Adult Degree of Malnutrition: Unspecified protein calorie malnutrition  Malnutrition Characteristics: Inadequate energy, Other (comment) (stress of illness)                  360 Statement: Protein calorie malnutrition r/t inadequate intake as evidenced by >7 day period of poor intakes and stress of infection; currently treated with regular diet and nutrition supplements    BMI Findings: Body mass index is 32.81 kg/m².

## 2023-11-28 NOTE — ASSESSMENT & PLAN NOTE
Recent Labs     11/26/23  0639 11/27/23  0640 11/28/23  0524   HGB 10.5* 10.2* 9.7*       '  Patient received 1 PRBC transfusion on 10/5 and 10/25. Her B/L hemoglobin is 12-14. No sites of bleeding, no black stools. Iron panel indicative of inflammatory anemia. Normal Vitamin B12 levels. Folate low at 5.5. Filgrastim 4 doses administered by heme/onc. Hb declined from 9.9 to 7.4, pt received 1 PRBC transfusion (11/24)    Plan:  Trend hemoglobin and transfuse for <7. As per heme/onc transfuse irradiated and leukoreduced blood products. Trend platelets  Folic acid 1mg daily supplementation  As per conversation with Heme/oncology attending, patient pancytopenia associated with chemotherapy is expected/predictable. No further anemia w/u required.   Low ANC management as per heme/onc

## 2023-11-28 NOTE — ASSESSMENT & PLAN NOTE
9/14 Neck/ soft tissue CT: Large enhancing soft tissue mass identified within the left neck involving multiple spaces. Centered within the left oropharynx with extensions as described above into multiple spaces. This results in significant airway compromise. 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 Small level 3/level 4 nodes are seen within the neck. Tracheostomy by ENT. Replaced on 9/26. H/o tobacco abuse, daily smoker. On nicotine while inpatient, confirmed with patient she needs nicotine patch. CT soft tissue neck shows reduced mass effect from 9/14 to 10/13. Can consider reducing trach size if continues to improve. Patient often refusing peg tube feeds but is feeding herself small meals orally. Plan:  ENT and heme onc following  Pt receiving her 4th cycle of chemotherapy - Day 5 (11/28)  As per speech therapist diet  Dysphagia 2. Continue parallel PEG tube feeds for nutrition. See acute respiratory failure and large B cell lymphoma above.

## 2023-11-29 LAB
ALBUMIN SERPL BCP-MCNC: 3.4 G/DL (ref 3.5–5)
ALP SERPL-CCNC: 56 U/L (ref 34–104)
ALT SERPL W P-5'-P-CCNC: 29 U/L (ref 7–52)
ANION GAP SERPL CALCULATED.3IONS-SCNC: 9 MMOL/L
AST SERPL W P-5'-P-CCNC: 26 U/L (ref 13–39)
BILIRUB SERPL-MCNC: 0.46 MG/DL (ref 0.2–1)
BUN SERPL-MCNC: 23 MG/DL (ref 5–25)
CA-I BLD-SCNC: 1.21 MMOL/L (ref 1.12–1.32)
CALCIUM ALBUM COR SERPL-MCNC: 10 MG/DL (ref 8.3–10.1)
CALCIUM SERPL-MCNC: 9.5 MG/DL (ref 8.4–10.2)
CHLORIDE SERPL-SCNC: 98 MMOL/L (ref 96–108)
CO2 SERPL-SCNC: 33 MMOL/L (ref 21–32)
CREAT SERPL-MCNC: 0.79 MG/DL (ref 0.6–1.3)
ERYTHROCYTE [DISTWIDTH] IN BLOOD BY AUTOMATED COUNT: 15.1 % (ref 11.6–15.1)
GFR SERPL CREATININE-BSD FRML MDRD: 76 ML/MIN/1.73SQ M
GLUCOSE SERPL-MCNC: 141 MG/DL (ref 65–140)
HCT VFR BLD AUTO: 31 % (ref 34.8–46.1)
HGB BLD-MCNC: 10.2 G/DL (ref 11.5–15.4)
MAGNESIUM SERPL-MCNC: 1.3 MG/DL (ref 1.9–2.7)
MAGNESIUM SERPL-MCNC: 2.9 MG/DL (ref 1.9–2.7)
MCH RBC QN AUTO: 29.4 PG (ref 26.8–34.3)
MCHC RBC AUTO-ENTMCNC: 32.9 G/DL (ref 31.4–37.4)
MCV RBC AUTO: 89 FL (ref 82–98)
NRBC BLD AUTO-RTO: 0 /100 WBCS
PHOSPHATE SERPL-MCNC: 2.4 MG/DL (ref 2.3–4.1)
PLATELET # BLD AUTO: 347 THOUSANDS/UL (ref 149–390)
PMV BLD AUTO: 8.9 FL (ref 8.9–12.7)
POTASSIUM SERPL-SCNC: 3.2 MMOL/L (ref 3.5–5.3)
PROT SERPL-MCNC: 6.3 G/DL (ref 6.4–8.4)
RBC # BLD AUTO: 3.47 MILLION/UL (ref 3.81–5.12)
SODIUM SERPL-SCNC: 140 MMOL/L (ref 135–147)
WBC # BLD AUTO: 4.46 THOUSAND/UL (ref 4.31–10.16)

## 2023-11-29 PROCEDURE — 94640 AIRWAY INHALATION TREATMENT: CPT

## 2023-11-29 PROCEDURE — 83735 ASSAY OF MAGNESIUM: CPT

## 2023-11-29 PROCEDURE — 85027 COMPLETE CBC AUTOMATED: CPT

## 2023-11-29 PROCEDURE — 82330 ASSAY OF CALCIUM: CPT

## 2023-11-29 PROCEDURE — 94003 VENT MGMT INPAT SUBQ DAY: CPT

## 2023-11-29 PROCEDURE — 99291 CRITICAL CARE FIRST HOUR: CPT | Performed by: INTERNAL MEDICINE

## 2023-11-29 PROCEDURE — 94150 VITAL CAPACITY TEST: CPT

## 2023-11-29 PROCEDURE — 84100 ASSAY OF PHOSPHORUS: CPT

## 2023-11-29 PROCEDURE — 99233 SBSQ HOSP IP/OBS HIGH 50: CPT | Performed by: INTERNAL MEDICINE

## 2023-11-29 PROCEDURE — 94760 N-INVAS EAR/PLS OXIMETRY 1: CPT

## 2023-11-29 PROCEDURE — 93005 ELECTROCARDIOGRAM TRACING: CPT

## 2023-11-29 PROCEDURE — 80053 COMPREHEN METABOLIC PANEL: CPT

## 2023-11-29 RX ORDER — POTASSIUM CHLORIDE 20MEQ/15ML
40 LIQUID (ML) ORAL ONCE
Status: COMPLETED | OUTPATIENT
Start: 2023-11-29 | End: 2023-11-29

## 2023-11-29 RX ORDER — POTASSIUM CHLORIDE 14.9 MG/ML
20 INJECTION INTRAVENOUS ONCE
Status: DISCONTINUED | OUTPATIENT
Start: 2023-11-29 | End: 2023-12-04 | Stop reason: HOSPADM

## 2023-11-29 RX ORDER — MAGNESIUM SULFATE HEPTAHYDRATE 40 MG/ML
4 INJECTION, SOLUTION INTRAVENOUS ONCE
Status: COMPLETED | OUTPATIENT
Start: 2023-11-29 | End: 2023-11-29

## 2023-11-29 RX ADMIN — APIXABAN 5 MG: 5 TABLET, FILM COATED ORAL at 08:44

## 2023-11-29 RX ADMIN — DIPHENHYDRAMINE HYDROCHLORIDE 25 MG: 50 INJECTION INTRAMUSCULAR; INTRAVENOUS at 08:38

## 2023-11-29 RX ADMIN — LEVALBUTEROL HYDROCHLORIDE 1.25 MG: 1.25 SOLUTION RESPIRATORY (INHALATION) at 13:40

## 2023-11-29 RX ADMIN — SENNOSIDES 17.6 MG: 8.8 SYRUP ORAL at 17:03

## 2023-11-29 RX ADMIN — FAMOTIDINE 20 MG: 20 TABLET, FILM COATED ORAL at 08:44

## 2023-11-29 RX ADMIN — METOPROLOL TARTRATE 25 MG: 25 TABLET, FILM COATED ORAL at 08:43

## 2023-11-29 RX ADMIN — APIXABAN 5 MG: 5 TABLET, FILM COATED ORAL at 21:17

## 2023-11-29 RX ADMIN — CHLORHEXIDINE GLUCONATE 15 ML: 1.2 SOLUTION ORAL at 21:18

## 2023-11-29 RX ADMIN — IPRATROPIUM BROMIDE 0.5 MG: 0.5 SOLUTION RESPIRATORY (INHALATION) at 13:40

## 2023-11-29 RX ADMIN — IPRATROPIUM BROMIDE 0.5 MG: 0.5 SOLUTION RESPIRATORY (INHALATION) at 07:46

## 2023-11-29 RX ADMIN — ALLOPURINOL 200 MG: 100 TABLET ORAL at 17:03

## 2023-11-29 RX ADMIN — BUDESONIDE 0.5 MG: 0.5 INHALANT ORAL at 07:46

## 2023-11-29 RX ADMIN — NICOTINE 7 MG: 7 PATCH, EXTENDED RELEASE TRANSDERMAL at 08:45

## 2023-11-29 RX ADMIN — OXYCODONE HYDROCHLORIDE 5 MG: 5 SOLUTION ORAL at 13:13

## 2023-11-29 RX ADMIN — BUDESONIDE 0.5 MG: 0.5 INHALANT ORAL at 20:12

## 2023-11-29 RX ADMIN — OXYCODONE HYDROCHLORIDE 5 MG: 5 SOLUTION ORAL at 19:42

## 2023-11-29 RX ADMIN — ACETAMINOPHEN 650 MG: 325 TABLET, FILM COATED ORAL at 08:43

## 2023-11-29 RX ADMIN — POTASSIUM CHLORIDE 40 MEQ: 1.5 SOLUTION ORAL at 07:34

## 2023-11-29 RX ADMIN — BUSPIRONE HYDROCHLORIDE 30 MG: 10 TABLET ORAL at 17:04

## 2023-11-29 RX ADMIN — QUETIAPINE FUMARATE 50 MG: 25 TABLET ORAL at 21:17

## 2023-11-29 RX ADMIN — POTASSIUM CHLORIDE 40 MEQ: 1.5 SOLUTION ORAL at 05:23

## 2023-11-29 RX ADMIN — SULFAMETHOXAZOLE AND TRIMETHOPRIM 1 TABLET: 800; 160 TABLET ORAL at 08:44

## 2023-11-29 RX ADMIN — AMLODIPINE BESYLATE 10 MG: 5 TABLET ORAL at 08:44

## 2023-11-29 RX ADMIN — GABAPENTIN 300 MG: 300 CAPSULE ORAL at 17:03

## 2023-11-29 RX ADMIN — FORMOTEROL FUMARATE DIHYDRATE 20 MCG: 20 SOLUTION RESPIRATORY (INHALATION) at 07:46

## 2023-11-29 RX ADMIN — BUSPIRONE HYDROCHLORIDE 30 MG: 10 TABLET ORAL at 21:20

## 2023-11-29 RX ADMIN — ALLOPURINOL 200 MG: 100 TABLET ORAL at 21:17

## 2023-11-29 RX ADMIN — SODIUM CHLORIDE 20 ML/HR: 0.9 INJECTION, SOLUTION INTRAVENOUS at 08:37

## 2023-11-29 RX ADMIN — LEVALBUTEROL HYDROCHLORIDE 1.25 MG: 1.25 SOLUTION RESPIRATORY (INHALATION) at 07:46

## 2023-11-29 RX ADMIN — ALLOPURINOL 200 MG: 100 TABLET ORAL at 10:43

## 2023-11-29 RX ADMIN — FAMOTIDINE 20 MG: 20 TABLET, FILM COATED ORAL at 17:03

## 2023-11-29 RX ADMIN — GABAPENTIN 300 MG: 300 CAPSULE ORAL at 08:44

## 2023-11-29 RX ADMIN — CHLORHEXIDINE GLUCONATE 15 ML: 1.2 SOLUTION ORAL at 08:44

## 2023-11-29 RX ADMIN — MELATONIN 3 MG: at 21:18

## 2023-11-29 RX ADMIN — IPRATROPIUM BROMIDE 0.5 MG: 0.5 SOLUTION RESPIRATORY (INHALATION) at 02:13

## 2023-11-29 RX ADMIN — LEVALBUTEROL HYDROCHLORIDE 1.25 MG: 1.25 SOLUTION RESPIRATORY (INHALATION) at 20:12

## 2023-11-29 RX ADMIN — LEVALBUTEROL HYDROCHLORIDE 1.25 MG: 1.25 SOLUTION RESPIRATORY (INHALATION) at 02:13

## 2023-11-29 RX ADMIN — MAGNESIUM SULFATE HEPTAHYDRATE 4 G: 40 INJECTION, SOLUTION INTRAVENOUS at 07:35

## 2023-11-29 RX ADMIN — IPRATROPIUM BROMIDE 0.5 MG: 0.5 SOLUTION RESPIRATORY (INHALATION) at 20:12

## 2023-11-29 RX ADMIN — FORMOTEROL FUMARATE DIHYDRATE 20 MCG: 20 SOLUTION RESPIRATORY (INHALATION) at 20:12

## 2023-11-29 RX ADMIN — BUSPIRONE HYDROCHLORIDE 30 MG: 10 TABLET ORAL at 08:44

## 2023-11-29 RX ADMIN — SERTRALINE 75 MG: 25 TABLET, FILM COATED ORAL at 08:43

## 2023-11-29 RX ADMIN — LEVOFLOXACIN 250 MG: 250 TABLET, FILM COATED ORAL at 08:44

## 2023-11-29 RX ADMIN — ACYCLOVIR 400 MG: 200 CAPSULE ORAL at 18:31

## 2023-11-29 RX ADMIN — FOLIC ACID 1 MG: 1 TABLET ORAL at 08:43

## 2023-11-29 RX ADMIN — OXYCODONE HYDROCHLORIDE 5 MG: 5 SOLUTION ORAL at 05:20

## 2023-11-29 RX ADMIN — FLUCONAZOLE 200 MG: 100 TABLET ORAL at 08:43

## 2023-11-29 RX ADMIN — ACYCLOVIR 400 MG: 200 CAPSULE ORAL at 08:47

## 2023-11-29 RX ADMIN — ALLOPURINOL 200 MG: 100 TABLET ORAL at 14:11

## 2023-11-29 RX ADMIN — RITUXIMAB 700 MG: 10 INJECTION, SOLUTION INTRAVENOUS at 09:14

## 2023-11-29 RX ADMIN — METOPROLOL TARTRATE 25 MG: 25 TABLET, FILM COATED ORAL at 21:18

## 2023-11-29 RX ADMIN — GABAPENTIN 300 MG: 300 CAPSULE ORAL at 21:17

## 2023-11-29 NOTE — PHYSICAL THERAPY NOTE
Physical Therapy Cancellation Note         11/29/23 1517   Note Type   Note type Cancelled Session   Cancel Reasons Refusal   Additional Comments Pt with active PT orders. Pt politely refusing at this time. States feeling unwell and tired. Requesting to rest. Agreeable to work with PT tomorrow. Will follow up as appropriate and schedule allows.      Fernando Cannon, PT

## 2023-11-29 NOTE — CASE MANAGEMENT
Case Management Progress Note    Patient name Smiley Melara  Location ICU 03/ICU 03 MRN [id number]  : 1955 Date 2023       LOS (days): 68  Geometric Mean LOS (GMLOS) (days): 26.10  Days to GMLOS:-50.9        OBJECTIVE:    Current admission status: Inpatient  Preferred Pharmacy:   [id number] [place] - [address]. [address] [place] [address]  Phone: [phone] Fax: [phone] [facility] [place] [place] [id number] [address] [address]  Phone: [phone] Fax: [phone]    Primary Care Provider: Danny Giordano MD    Primary Insurance: [place]  Secondary Insurance: [facility]    PROGRESS NOTE:    Pt was trailed on her home vent today with [facility] RTAraceli. As per Araceli pt did well and tolerated their vent with the settings provided. CM rounded with CC team. Plan to have a meeting with pt and her daughter, Mehreen Villavicencio, on Friday afternoon. As per CC team the goal would be for dc on Monday. Requesting Home Vent at bedside to trial at night through the weekend. Aware that CM will f/u with any additional documentation needed for the pt DME needs at home. Aware CM will verify with daughter that she is available for meeting on Friday. CM spoke with Sandy Travis with [facility] - He will finalize any additional orders needed for home set up. As per [address] [place] [address] Drive completed and trach supplies ordered. Sandy Travis will notify CM of what is needed for home O2 set up. As per Sandy Travis they will likely set pt up with two home concentrators. Sandy Travis will have home vent brought in for the weekend. Sandy Travis notified of goal for Monday dc. Sadny Travis will have RT available for day of dc to meet pt at home. CM met with pt and her daughter, Mehreen Villavicencio, at bedside. CM made them aware of goal for dc on Monday. They will review plan of care with them.  Mehreen Villavicencio did confirm she is available on Friday after work for a meeting with the medical team. Alton Nielsen reports she does plan to take FMLA at work after her sick time is used to continue to assist pt at the home. Alton Nielsen did confirm she feels good with the bedside teaching for the trach care and the teaching at home with Karina Jansen with the home Vent. SHIRA sent message to aKrina Jansen via [employer] for the DME - requested f/u with daughter regarding delivery. Notified them of dc date for Monday. SHIRA sent message to  [facility] requesting a scheduled RN visit for Monday 8/29/23. Pt's nurse also updated on plan for anticipated dc on Monday. SHIRA received VM from pt's supports , Elly Record [phone]. CM also returned call and left VM.

## 2023-11-29 NOTE — ASSESSMENT & PLAN NOTE
Lab Results   Component Value Date    EGFR 76 11/29/2023    EGFR 58 11/28/2023    EGFR 48 11/27/2023    CREATININE 0.79 11/29/2023    CREATININE 0.98 11/28/2023    CREATININE 1.15 11/27/2023     Baseline Cr between 0.75-1.1  Initial PO felt 2/2 prerenal azotemia 2/2 diuresis, reduced PO intake +/- ATN. Patient received 2 doses of Bumex IV 2 g as she had not been responding appropriately to IV 40 Lasix daily. Creatinine went up by 0.5 meeting criteria for an PO. Suspect most likely due to medications as a combination of prerenal and intrinsic. FENa was 0.2%, UA shows no casts or signs of infection, urine eosinophils 0%. Patient likely has prerenal PO from volume depletion. Received 2 L NS and 1 pRBC and cr went up by 0.07 still and Na and Cl went down, suspect that volume prevented further cr rise and free water excretion impaired by kidney function, allowing hyponatremia to increase. Suspect that now that nephrotoxic medications have been stopped she responded well to Lasix and creatinine downtrended. PO now resolved. Will be cautious about nephrotoxins and monitor as restarting EPOCH and ppx. Patient gets volume depleted and overloaded very easily. Especially from chemo. Plan:   Continue to monitor UO/renal function. Avoid nephrotoxic agents  Follow up with BMP in the morning   Continue to monitor a.m. BMP.

## 2023-11-29 NOTE — ASSESSMENT & PLAN NOTE
Wt Readings from Last 3 Encounters:   11/29/23 78.3 kg (172 lb 9.9 oz)   09/07/23 74.6 kg (164 lb 6.4 oz)   08/30/23 73.3 kg (161 lb 9.6 oz)     Lab Results   Component Value Date    LVEF 65 11/21/2023     (H) 10/28/2023     (H) 09/29/2023     Echo 11/21/23: LVEF 65%.        Plan:  K > 4   Measure I/O

## 2023-11-29 NOTE — ASSESSMENT & PLAN NOTE
Large B-cell lymphoma, stage II. First chemo cycle 9/22 4 days of R-EPOCH. 9/27 Rituxan. 9/28 Filgrastim. Received nivestym 300 mc 7 days dcd on 10/26   Discontinue Prophylaxis as per Heme Oncology recommendations  Pt had hyperkalemia of 5.5, can be due to Bactrim, consider switching to atovaquone      9/22  4 days of R-EPOCH.  9/27 Rituxan  10/13 for [facility] cycle 2, which was done over 4 days and cytoxan on 10/19.  11/6 completed R-EPOCH cycle 3 and Cytoxan 11/7. Plan:  Continue with current chemo treatment 11/24-11/28. Continue Bactrim, Acyclovir, Allopurinol for prophylaxis   Transfuse blood products as needed.   Keep Hgb>7 and Plt>20 for chemo   See acute respiratory failure above

## 2023-11-29 NOTE — UTILIZATION REVIEW
Continued Stay Review    Date: 11/29/23                           Current Patient Class: inpatient   Current Level of Care: Level 1 Stepdown    HPI:68 y.o. female initially admitted on   9/13/23 inpatient  due to angioedema/respiratory failure with new right middle lobe opacity/Mediastinal lymphadenopathy/COPD/PO/heart failure not in exacerbation/PO. 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  9/14: Bronchoscopy/ ABL. + pneumonia and completed 7 day course of cefepime and vancomycin . Has large B cell lymphoma, stage II, First chemo cycle 9/22 4 days of R-EPOCH. 9/27 Rituxan. 9/28 Filgrastim. Received nivestym 300 mc 7 days dcd on 10/26.  11/6 completed R-EPOCH cycle 3. Cytoxan on 11/7 . Pancytopenic due to chemo and transfused 2 units PRBC. Baseline Cr between 0.75-1.1 and initially with PO. Has chronic pain. Per CM can not get LTACH placement on chemo. If to go to SNF short term rehab,  need to be on trach collar for at least 72 hours with no need for the vent. Patient would need to be around 28% FiO2       Assessment/Plan:   11/29/23:  12 beat run of non sustained VT. Tolerated PS last night. On exam:  trache. Tunneled DL RIJ catheter without purulence. Appears ill. Lungs rhonchi. Plan is titrate oxygen to maintain sat > 85%. Continue with Bipap via trache at night. EPAP 8, PSV 4. Aggressive pulmonary toilet. Suction via trache for desat or unable to expectorate. Continue inhalation treatment with Pulmicort and formoterol q12 , duonebs QID, atrovent and xopenex. PT/OT. Continue with current chemo treatment 11/24-11/28. On 4th cycle of chemo. Continue Bactrim, Acyclovir, Allopurinol for prophylaxis. Keep Hgb>7 and Plt>20 for chemo . As per speech therapist diet  Dysphagia 2.      Vital Signs:   11/29/23 1100 98.8 °F (37.1 °C) 69 16 105/59 76 94 % -- -- -- --   11/29/23 1030 98.5 °F (36.9 °C) 68 14 113/64 84 86 % Abnormal  -- -- -- --   11/29/23 1000 98.6 °F (37 °C) 67 17 104/61 78 91 % -- -- -- --   11/29/23 0930 -- 76 17 102/60 75 73 % Abnormal  -- -- -- --   11/29/23 0900 98 °F (36.7 °C) 69 18 102/60 -- 90 % -- -- -- --   11/29/23 0843 98 °F (36.7 °C) 87 20 138/86 107 95 % -- -- -- Lying   11/29/23 0800 -- 82 -- -- -- -- -- -- -- --   11/29/23 0752 -- -- -- -- -- -- 50 10 L/min Trach mask --   11/29/23 0712 98.7 °F (37.1 °C) 85 18 144/68 98 98 % -- -- -- Lying   11/29/23 0700 -- 76 12 -- -- 98 % -- -- -- --   11/29/23 0600 -- 106 Abnormal  19 -- -- -- -- -- -- --   11/29/23 0514 -- 95 29 Abnormal  162/83 114 -- -- -- -- --   11/29/23 0500 -- 85 19 -- -- 98 % -- -- -- --   11/29/23 0424 -- -- -- -- -- -- 40 -- Ventilator --   11/29/23 0400 -- 87 15 -- -- 96 % -- -- -- --   11/29/23 0300 -- 86 16 158/83 113 96 % -- -- -- --   11/29/23 0248 99 °F (37.2 °C) -- -- 158/83 113 -- -- -- Ventilator Lying   11/29/23 0214 -- -- -- -- -- -- 40 -- Ventilator --   11/29/23 0200 -- 74 14 -- -- 97 % -- -- -- --   11/29/23 0100 -- 71 12 -- -- 94 % -- -- -- --   11/29/23 0000 -- 77 15 -- -- 95 % -- -- -- --   11/28/23 2320 98.8 °F (37.1 °C) -- -- -- -- -- -- -- Ventilator        Pertinent Labs/Diagnostic Results:   XR chest 1 view portable   Final Result by Walter Garcia MD (11/25 3497)      Moderate pulmonary edema. Pneumonia not excluded in the appropriate clinical setting. Workstation performed: [id number]         CT chest wo contrast   Final Result by Jake Bobby MD (11/22 4435)      Breathing motion artifacts and emphysematous changes limit evaluation. Infiltrates in the lingula and left lower lobe suggested. Improved from prior study.                Workstation performed: [id number]         XR chest portable ICU   Final Result by Jacey Mata MD (11/22 3861)      Stable exam.                  Workstation performed: [id number]         XR chest portable ICU Final Result by Denver Kaufman, DO (11/21 4262)      Diffuse interstitial and groundglass opacities throughout the lungs, unchanged. Findings could be infectious/inflammatory and/or secondary to edema. Workstation performed: [id number]         CT soft tissue neck w contrast   Final Result by Ar Schuler MD (11/15 4302)      Improved ill-defined mass centered along the left nasopharynx and oropharynx with regional extension of disease as described above. Findings consistent with the clinical history of lymphoma. No new suspicious adenopathy identified. Status post right-sided Mediport placement. Nonocclusive thrombus is seen within the right internal jugular vein just above the entrance site of the Mediport catheter into the internal jugular vein. Improved previously seen consolidation and effusion in the visualized lung fields. The study was marked in [facility] for immediate notification. Workstation performed: [id number]         FL barium swallow video w speech   Final Result by LORIE Soler (11/14 6172)      XR chest portable ICU   Final Result by Rui Giron MD (11/11 1312)      Persistent dense  left  lung base opacity may represent a combination of effusion and parenchymal opacity. Improved diffuse bilateral hazy/groundglass opacity. Workstation performed: [id number]         CT chest wo contrast   Final Result by Florida Velásquez MD (11/04 0101)      1. Persistent severe bilateral alveolar and interstitial opacities superimposed upon pulmonary emphysema. For the most part, this is unchanged from the most recent prior study although there is somewhat less atelectasis at the lung bases perhaps related    to diminished size of previously seen pleural effusions. When the airspace disease is compared to earlier examinations for example 9/13/2023, significant worsening is again noted   2. No pneumothorax   3.  Markedly enlarged main pulmonary artery consistent with pulmonary arterial hypertension. Workstation performed: [id number]         XR chest portable ICU   Final Result by Akanksha Cody MD (11/03 4146)      Diffuse abnormal interstitial and alveolar disease appears worse. Correlate for multifocal pneumonia, heart failure, ARDS. Workstation performed: [id number]         XR chest portable   Final Result by Lisa Preston MD (10/30 1109)      Persistent dense  left  lung base opacity may represent a combination of effusion and parenchymal opacity. Persistent bilateral diffuse interstitial and hazy airspace/groundglass opacity. Workstation performed: [id number]         XR chest portable ICU   Final Result by Lisa Preston MD (10/30 1120)      No significant change. Workstation performed: [id number]         FL barium swallow video w speech   Final Result by LORIE RILEY (10/22 1411)      IR tunneled central line placement   Final Result by Azucena Rodarte MD (10/19 1254)   Successful placement of right chest tunneled central venous catheter via the right internal jugular vein. The tip terminates at the superior cavoatrial junction. Tunneled CVC is ready for immediate use. Workstation performed: [id number]         XR chest portable ICU   Final Result by Misty Carlson MD (10/19 1658)      Persistent groundglass opacity, likely pulmonary edema, with trace effusions. Workstation performed: [id number]         XR chest portable   Final Result by Lindsey Staples MD (01/29 1063)      Improved groundglass opacities in the bilateral lower lung zones. Workstation performed: [id number]         CT soft tissue neck w contrast   Final Result by MENG Garcia MD (10/13 1534)   Improving multi spatial mass centered in the left nasopharynx/oropharynx.  Again the mass extends up to the skull base in the infratemporal fossa with loss of perineural fat at the left foramen ovale. Decreased mass effect on the airway. No adenopathy by size criteria. Bilateral effusions and extensive airspace disease is stable compared with yesterday's chest CT. Workstation performed: [id number]         CT chest abdomen pelvis w contrast   Final Result by Can Alex MD (10/13 1104)      Diffuse bilateral groundglass opacities and areas of more dense consolidation as described above which could represent pulmonary edema, pneumonia, or diffuse alveolar damage. Complex right upper pole renal lesion for which further evaluation is recommended with MRI on an outpatient basis. Mildly enlarged mediastinal lymph nodes which may be due to the patient's known lymphoma. The study was marked in [facility] for immediate notification. Workstation performed: [id number]         XR chest portable   Final Result by Zeke Cortes MD (10/12 0645)      Diffuse abnormal airspace and groundglass opacities. Correlate for infection. Workstation performed: [id number]         XR chest portable   Final Result by Swathi Weston MD (10/09 8904)      Persistent pulmonary edema with small effusions. Workstation performed: [id number]         XR chest portable ICU   Final Result by Adrianna Reaves MD (10/05 1487)      Stable patchy bilateral airspace consolidation and diffuse groundglass opacities, which may represent a combination of atelectasis and pneumonia. Workstation performed: [id number]         XR chest portable ICU   Final Result by Zeke Cortes MD (10/02 0710)      Airspace disease at both lung bases may represent infiltrates and/or atelectasis. Diffuse groundglass opacities concerning for infection. Workstation performed: [id number]         CT pe study w abdomen pelvis w contrast   Final Result by Leopold Lyons, MD (09/30 1939)      1.  Suboptimal opacification of the pulmonary arteries. No large central pulmonary embolism is seen. 2. Redemonstrated dependent atelectasis in the right greater than left lower lobes and in the left upper lobe/lingula. Increased patchy opacity in the right lower lobe could be due to atelectasis or pneumonia. 3. Diffuse bilateral groundglass opacities may be infectious or inflammatory. 4. Hilar and mediastinal lymphadenopathy similar to prior. 5. No acute findings in the abdomen or pelvis. 6. Endometrium appears thickened in this postmenopausal patient. Prior pelvic ultrasound 10/26/2020 showed abnormal thickened endometrium. Recommend nonemergent gynecologic evaluation if not already performed. 7. Chronic lytic lesion in T12 vertebral body gradually progressing since 2013 with chronic pathologic fracture. The study was marked in [facility] for immediate notification. Workstation performed: [id number]         XR chest portable   Final Result by Duglas Turner MD (10/01 1225)      Cardiomegaly      Vascular congestion      Bibasilar atelectasis and/or infiltrate left greater. Bilateral pleural effusions                  Workstation performed: [id number]         XR chest portable   Final Result by Daron Samuels MD (09/28 0929)      Interval removal of the endogastric tube. No other significant interval change. Workstation performed: [id number]         IR gastrostomy tube placement   Final Result by Jaswinder Moise MD (09/29 1610)   Impression: Successful percutaneous fluoroscopic and ultrasound-guided placement of a 12 [place] gastrostomy tube as described above.                Workstation performed: [id number]         XR chest portable ICU   Final Result by Duglas Turner MD (09/25 1515)      Mild cardiomegaly      Slightly increased mild vascular congestion      Bilateral pleural effusions      Increased left base atelectasis and/or infiltrate. Workstation performed: [id number]         VAS upper limb venous duplex scan, unilateral/limited   Final Result by Azalea Mercado MD (09/23 1841)      XR chest portable ICU   Final Result by Swathi Weston MD (09/22 1007)      Persistent pulmonary venous congestion with small effusions and bibasilar atelectasis. Workstation performed: [id number]         US right upper quadrant   Final Result by Anisha Burrell MD (09/22 0630)      No cholelithiasis. Gallbladder sludge is present with wall thickness upper limits of normal. Negative sonographic Cancino sign. Findings may be sequela of chronic gallbladder dysfunction. Consider follow-up with a HIDA scan if it would alter patient    management. Workstation performed: [id number]         CT abdomen pelvis w contrast   Final Result by Gagan Gary MD (09/21 1185)      1. No abdominal lymphadenopathy. 2.  Question gallbladder wall thickening versus motion artifact. If there is clinical concern for gallbladder pathology, ultrasound is recommended. 3.  Chronic T12 lytic lesion with pathologic fracture. Workstation performed: [id number]         MRI brain w wo contrast   Final Result by Erwin Klein MD (09/21 9758)      No evidence of brain metastases. 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 Asymmetric patchy enhancement and restricted diffusion in the left mastoid. The differential includes neoplastic involvement and infection. Workstation performed: [id number]         MRI soft tissue neck wo and w contrast   Final Result by Erwin Klein MD (09/21 1005)      Known, large left neck mass described in detail above. Workstation performed: [id number]         XR chest portable   Final Result by Christa Ferreira DO (09/20 1123)      1.  Nasogastric tube is seen with its distal portions within the stomach. The tip of the tube courses beyond the inferior margin of the study. 2. Pulmonary vascular congestion with obscuration of the left hemidiaphragm. This is stable from prior radiograph and may represent small left effusion versus consolidation. 3. Right basilar atelectasis versus trace right effusion. Workstation performed: [id number]         XR chest portable   Final Result by Shabnam Becerra MD (09/18 1255)   Increased lung markings seen suggest congestion. The left base is obscured   No worsening            Workstation performed: [id number]         CT soft tissue neck w contrast   Final Result by Woo Ayala DO (09/14 1644)      Large enhancing soft tissue mass identified within the left neck involving multiple spaces. This appears to be centered within the left oropharynx with extensions as described above into multiple spaces. This results in significant airway compromise. 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 Small level 3 and level 4 nodes are seen within the neck.          I personally discussed this study with ICU resident on 9/14/2023 4:44 PM.            Workstation performed: [id number]         IR gastrostomy (G) tube check/change/reinsertion/upsize    (Results Pending)   NM PET CT skull base to mid thigh    (Results Pending)      Results from last 7 days   Lab Units 11/29/23  0516 11/28/23  0524 11/27/23  0640 11/26/23  0639 11/26/23  0617 11/25/23  0452 11/24/23  0731 11/24/23  0505   WBC Thousand/uL 4.46 3.13* 4.82  --  7.70 8.21  --  7.69   HEMOGLOBIN g/dL 10.2* 9.7* 10.2*  --  9.3* 9.1*  --  7.4*   I STAT HEMOGLOBIN g/dl  --   --   --  10.5*  --   --    < >  --    HEMATOCRIT % 31.0* 30.5* 32.2*  --  29.8* 29.0*  --  25.1*   HEMATOCRIT, ISTAT %  --   --   --  31*  --   --    < >  --    PLATELETS Thousands/uL 347 354 388  --  369 318  --  273   NEUTROS ABS Thousands/µL  --  2.97 4.53  --   --   --   --  5.43 BANDS PCT %  --   --   --   --  2 5  --   --     < > = values in this interval not displayed. Results from last 7 days   Lab Units 11/29/23 0516 11/28/23 0524 11/27/23 0640 11/26/23 2035 11/26/23 0912 11/26/23  0754 11/26/23  0639 11/24/23 2050 11/24/23  0731   SODIUM mmol/L 140 141 143 143 142   < >  --    < >  --    POTASSIUM mmol/L 3.2* 3.5 4.0 4.1 3.9   < >  --    < >  --    CHLORIDE mmol/L 98 103 106 102 102   < >  --    < >  --    CO2 mmol/L 33* 30 30 32 32   < >  --    < >  --    CO2, I-STAT mmol/L  --   --   --   --   --   --  33*  --  30   ANION GAP mmol/L 9 8 7 9 8   < >  --    < >  --    BUN mg/dL 23 31* 31* 28* 27*   < >  --    < >  --    CREATININE mg/dL 0.79 0.98 1.15 1.38* 1.27   < >  --    < >  --    EGFR ml/min/1.73sq m 76 58 48 38 42   < >  --    < >  --    CALCIUM mg/dL 9.5 9.5 9.7 9.7 9.8   < >  --    < >  --    CALCIUM, IONIZED mmol/L 1.21  --   --   --   --   --   --   --   --    CALCIUM, IONIZED, ISTAT mmol/L  --   --   --   --   --   --  1.31  --  1.27   MAGNESIUM mg/dL 1.3*  --   --   --   --   --   --   --   --    PHOSPHORUS mg/dL 2.4  --   --   --   --   --   --   --   --     < > = values in this interval not displayed.      Results from last 7 days   Lab Units 11/29/23 0516 11/28/23 0524 11/27/23 0640 11/26/23 2035 11/26/23  0912   AST U/L 26 18 16 16 11*   ALT U/L 29 18 14 13 9   ALK PHOS U/L 56 56 61 69 64   TOTAL PROTEIN g/dL 6.3* 6.1* 6.5 6.5 6.3*   ALBUMIN g/dL 3.4* 3.2* 3.3* 3.4* 3.2*   TOTAL BILIRUBIN mg/dL 0.46 0.33 0.33 0.26 0.24     Results from last 7 days   Lab Units 11/29/23  0516 11/28/23  0524 11/27/23  0640 11/26/23  2035 11/26/23  0912 11/26/23  0754 11/25/23  1938 11/25/23  0852 11/24/23  2050 11/24/23  0505 11/23/23  0524   GLUCOSE RANDOM mg/dL 141* 146* 138 201* 145* 136 165* 130 160* 99 95     Results from last 7 days   Lab Units 11/23/23  0542   PH ART  7.357   PCO2 ART mm Hg 53.4*   PO2 ART mm Hg 36.9*   HCO3 ART mmol/L 29.3*   BASE EXC ART mmol/L 3.2 O2 CONTENT ART mL/dL 7.5*   O2 HGB, ARTERIAL % 64.9*   ABG SOURCE  Line, Venous     Results from last 7 days   Lab Units 11/26/23  0639 11/24/23  0731   I STAT BASE EXC mmol/L 6* 7*   I STAT O2 SAT % 100* 100*   ISTAT PH ART  7.421 7.601*   I STAT ART PCO2 mm HG 49.0* 29.6*   I STAT ART PO2 mm .0* 136.0*   I STAT ART HCO3 mmol/L 31.9* 29.1*     Results from last 7 days   Lab Units 11/25/23  0547   UNIT PRODUCT CODE  [id number]   UNIT NUMBER  [id number]   UNITABO  A   UNITRH  POS   CROSSMATCH  Compatible   UNIT DISPENSE STATUS  Presumed Trans   UNIT PRODUCT VOL mL 350     Results from last 7 days   Lab Units 11/24/23  1602   SODIUM UR  89   CREATININE UR mg/dL 86.5       Medications:   Scheduled Medications:  acyclovir, 400 mg, Oral, BID  allopurinol, 200 mg, Oral, 4x Daily  amLODIPine, 10 mg, Oral, Daily  apixaban, 5 mg, Oral, BID  budesonide, 0.5 mg, Nebulization, Q12H  busPIRone, 30 mg, Oral, TID  chlorhexidine, 15 mL, Mouth/Throat, Q12H ASHLEY  famotidine, 20 mg, Oral, BID  fluconazole, 200 mg, Oral, Daily  folic acid, 1 mg, Oral, Daily  formoterol, 20 mcg, Nebulization, Q12H  gabapentin, 300 mg, Oral, TID  levalbuterol, 1.25 mg, Nebulization, Q6H   And  ipratropium, 0.5 mg, Nebulization, Q6H  levofloxacin, 250 mg, Oral, Q24H ASHLEY  magnesium sulfate, 4 g, Intravenous, Once  melatonin, 3 mg, Oral, HS  metoprolol tartrate, 25 mg, Oral, Q12H ASHLEY  nicotine, 7 mg, Transdermal, Daily  oxyCODONE, 5 mg, Oral, Q8H  polyethylene glycol, 17 g, Oral, Daily  potassium chloride, 20 mEq, Intravenous, Once  QUEtiapine, 50 mg, Oral, HS  senna, 17.6 mg, Oral, BID  sertraline, 75 mg, Per PEG Tube, Daily  sulfamethoxazole-trimethoprim, 1 tablet, Oral, Once per day on Mon Wed Fri    diphenhydrAMINE (BENADRYL) 25 mg in sodium chloride 0.9 % 50 mL IVPB  Dose: 25 mg  Freq:  Once Route: IV  Last Dose: 25 mg (11/29/23 0838)  Start: 11/29/23 0800 End: 11/29/23 0858   magnesium sulfate 4 g/100 mL IVPB (premix) 4 g  Dose: 4 g  Freq: Once Route: IV  Last Dose: 4 g (11/29/23 0735)  Start: 11/29/23 0700   potassium chloride oral solution 40 mEq  Dose: 40 mEq  Freq: Once Route: PO  Start: 11/29/23 0645 End: 11/29/23 0734   potassium chloride oral solution 40 mEq  Dose: 40 mEq  Freq: Once Route: PO  Start: 11/29/23 0515 End: 11/29/23 0523     riTUXimab (RITUXAN) 700 mg in sodium chloride 0.9 % 280 mL subsequent titrated chemo infusion  Dose: 700 mg  Freq: Once Route: IV  Start: 11/29/23 0900 End: 11/29/23 0914   sodium chloride 0.9 % infusion  Dose: 20 mL/hr  Freq: Once Route: IV  Indications Comment: KVO  Last Dose: 20 mL/hr (11/29/23 0837)  Start: 11/29/23 0800 End: 11/29/23 0837     Continuous IV Infusions: none      PRN Meds: not used. alteplase, 2 mg, Intracatheter, Q1MIN PRN  ondansetron, 4 mg, Intravenous, Q8H PRN  oxyCODONE, 2.5 mg, Oral, Q6H PRN      Skin care plans:   1-Hydraguard to bilateral sacrum, buttock and heels BID and PRN   2-Elevate heels to offload pressure. 3-Ehob cushion in chair when out of bed. 4-Moisturize skin daily with skin nourishing cream.   5-Turn/reposition q2h or when medically stable for pressure re-distribution on skin. Discharge Plan: to be determined. Network Utilization Review Department  ATTENTION: Please call with any questions or concerns to [phone] and carefully listen to the prompts so that you are directed to the right person. All voicemails are confidential.   For Discharge needs, contact Care Management DC Support Team at [phone] opt. 2  Send all requests for admission clinical reviews, approved or denied determinations and any other requests to dedicated fax number below belonging to the campus where the patient is receiving treatment.  List of dedicated fax numbers for the Facilities:  [place] DENIALS (Administrative/Medical Necessity) [phone]   DISCHARGE SUPPORT TEAM (NETWORK) [address] (Maternity/NICU/Pediatrics) [address] [phone]   [address] [phone]   [facility] [address] [address] [facility] [address] [place] [address] [facility] [phone]

## 2023-11-29 NOTE — PROGRESS NOTES
Medical Oncology/Hematology Follow UP Note  Katty Maharaj, female, 77 y. o., 1955,  ICU 03/ICU 03, [id number]     Assessment and Plan  1. 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 Rituximab today. Tolerating treatment well chemotherapy. No issues concerns or complaints at this time. Labs reviewed and is okay for her to continue with treatment and get rituximab. She will start filgrastim support tomorrow and we will continue with daily. We will monitor daily CBCs with differential and stop once she has elevated white blood cells/neutrophils. This is used to support her neutrophils and prevent febrile neutropenia. Continue supportive care recommend transfusion for hemoglobin less than 7 and platelets less than 62,537. Will continue to follow along but please call with issues or concerns. Subjective:  Doing well and eating. No complaints or issues    Review of Systems:   Review of Systems   Constitutional:  Negative for chills and fever. HENT:  Negative for ear pain, mouth sores, sore throat, trouble swallowing and voice change. Eyes:  Negative for pain and visual disturbance. Respiratory:  Negative for cough and shortness of breath.         + trach - oxygenation support    Cardiovascular:  Negative for chest pain and palpitations. Gastrointestinal:  Negative for abdominal pain and vomiting. Genitourinary:  Negative for dysuria and hematuria. Musculoskeletal:  Negative for arthralgias and back pain. Skin:  Negative for color change and rash. Neurological:  Negative for seizures and syncope. Hematological:  Negative for adenopathy. All other systems reviewed and are negative.       Past Medical History:   Diagnosis Date    Anxiety     Depression     Fatty liver     Hyperlipidemia     Hypertension     Psychiatric disorder     Varicella        Past Surgical History:   Procedure Laterality Date    BREAST SURGERY Bilateral Reduction Procedure    CARDIAC SURGERY       SECTION      x4    DILATION AND CURETTAGE OF UTERUS      ECTOPIC PREGNANCY SURGERY      IR GASTROSTOMY TUBE PLACEMENT  2023    IR TUNNELED CENTRAL LINE PLACEMENT  10/19/2023     [address] INCL FLUOR GDNCE DX W/CELL WASHG SPX N/A 2023    Procedure: [address];  Surgeon: Rey Forrest MD;  Location: [facility];  Service: ENT    TRACHEOSTOMY N/A 2023    Procedure: TRACHEOSTOMY, biopsy of nasopharynx mass;  Surgeon: Rey Forrest MD;  [facility];  Service: ENT       Family History   Problem Relation Age of Onset    Lung cancer Mother     Cirrhosis Father     Hypertension Sister     Bipolar disorder Sister     Heart disease Sister     Diabetes Family     Heart disease Family     Hypertension Family     Stroke Family     Thyroid disease Family        Social History     Socioeconomic History    Marital status:      Spouse name: None    Number of children: None    Years of education: None    Highest education level: None   Occupational History    Occupation: retired   Tobacco Use    Smoking status: Every Day     Packs/day: 1.00     Types: Cigarettes    Smokeless tobacco: Never    Tobacco comments:     2 Black and milds per day   Vaping Use    Vaping Use: Never used   Substance and Sexual Activity    Alcohol use: Not Currently    Drug use: Yes     Types: Marijuana     Comment: for pain    Sexual activity: Not Currently     Partners: Male     Birth control/protection: None   Other Topics Concern    None   Social History Narrative    Educational level-special ed/completed Master's Degree     Social Determinants of Health     Financial Resource Strain: Low Risk  (2023)    Overall Financial Resource Strain (CARDIA)     Difficulty of Paying Living Expenses: Not hard at all   Food Insecurity: Not on file   Transportation Needs: No Transportation Needs (2023)    PRAPARE - Transportation     Lack of Transportation (Medical): No     Lack of Transportation (Non-Medical):  No   Physical Activity: Not on file   Stress: Not on file   Social Connections: Not on file   Intimate Partner Violence: Not on file   Housing Stability: Not on file         Current Facility-Administered Medications:     acyclovir (ZOVIRAX) capsule 400 mg, 400 mg, Oral, BID, Jami Tovar MD, 400 mg at 11/29/23 1831    allopurinol (ZYLOPRIM) tablet 200 mg, 200 mg, Oral, 4x Daily, Navin Sifuentes MD, 200 mg at 11/29/23 1703    alteplase (CATHFLO) injection 2 mg, 2 mg, Intracatheter, Q1MIN PRN, Adriana Im, CRNP, 2 mg at 11/17/23 1435    alteplase (CATHFLO) injection 2 mg, 2 mg, Intracatheter, Q1MIN PRN, Adriana Im, CRNP    alteplase (CATHFLO) injection 2 mg, 2 mg, Intracatheter, Q1MIN PRN, Jami Tovar MD    alteplase (CATHFLO) injection 2 mg, 2 mg, Intracatheter, Q1MIN PRN, Jami Tovar MD    alteplase (CATHFLO) injection 2 mg, 2 mg, Intracatheter, Q1MIN PRN, Jami Tovar MD    amLODIPine (NORVASC) tablet 10 mg, 10 mg, Oral, Daily, RANDEE Chavarria, 10 mg at 11/29/23 0844    apixaban (ELIQUIS) tablet 5 mg, 5 mg, Oral, BID, Edgardo Rodriguez MD, 5 mg at 11/29/23 0844    budesonide (PULMICORT) inhalation solution 0.5 mg, 0.5 mg, Nebulization, Q12H, RANDEE Chavarria, 0.5 mg at 11/29/23 0746    busPIRone (BUSPAR) tablet 30 mg, 30 mg, Oral, TID, RANDEE Chavarria, 30 mg at 11/29/23 1704    chlorhexidine (PERIDEX) 0.12 % oral rinse 15 mL, 15 mL, Mouth/Throat, Q12H [facility], RANDEE Chavarria, 15 mL at 11/29/23 0844    famotidine (PEPCID) tablet 20 mg, 20 mg, Oral, BID, RANDEE Chavarria, 20 mg at 11/29/23 1703    [START ON 11/30/2023] Filgrastim-aafi (NIVESTYM) subcutaneous injection 300 mcg, 300 mcg, Subcutaneous, Daily, Jami Tovar MD    fluconazole (DIFLUCAN) tablet 200 mg, 200 mg, Oral, Daily, Jami Tovar MD, 200 mg at 28/41/85 1440    folic acid (FOLVITE) tablet 1 mg, 1 mg, Oral, Daily, RANDEE Chavarria, 1 mg at 11/29/23 0843    formoterol (PERFOROMIST) nebulizer solution 20 mcg, 20 mcg, Nebulization, Q12H, RANDEE Chavarria, 20 mcg at 11/29/23 0746    gabapentin (NEURONTIN) capsule 300 mg, 300 mg, Oral, TID, RANDEE Chavarria, 300 mg at 11/29/23 1703    levalbuterol (XOPENEX) inhalation solution 1.25 mg, 1.25 mg, Nebulization, Q6H, 1.25 mg at 11/29/23 1340 **AND** ipratropium (ATROVENT) 0.02 % inhalation solution 0.5 mg, 0.5 mg, Nebulization, Q6H, RANDEE Chavarria, 0.5 mg at 11/29/23 1340    levofloxacin (LEVAQUIN) tablet 250 mg, 250 mg, Oral, Q24H [address], Elodia Tovar MD, 250 mg at 11/29/23 0844    melatonin tablet 3 mg, 3 mg, Oral, HS, RANDEE Chavarria, 3 mg at 11/28/23 2125    metoprolol tartrate (LOPRESSOR) tablet 25 mg, 25 mg, Oral, Q12H [address], RANDEE Chavarria, 25 mg at 11/29/23 0843    [COMPLETED] nicotine (NICODERM CQ) 14 mg/24hr TD 24 hr patch 14 mg, 14 mg, Transdermal, Daily, 14 mg at 10/15/23 1002 **FOLLOWED BY** nicotine (NICODERM CQ) 7 mg/24hr TD 24 hr patch 7 mg, 7 mg, Transdermal, Daily, RNADEE Chavarria, 7 mg at 11/29/23 0845    ondansetron (ZOFRAN) injection 4 mg, 4 mg, Intravenous, Q8H PRN, RANDEE Chavarria, 4 mg at 11/22/23 2009    ondansetron (ZOFRAN) injection 4 mg, 4 mg, Intravenous, Q8H PRN, Elodia Tovar MD    oxyCODONE (ROXICODONE) oral solution 2.5 mg, 2.5 mg, Oral, Q6H PRN, RANDEE Chavarria, 2.5 mg at 11/20/23 0931    oxyCODONE (ROXICODONE) oral solution 5 mg, 5 mg, Oral, Q8H, RANDEE Chavarria, 5 mg at 11/29/23 1313    polyethylene glycol (MIRALAX) packet 17 g, 17 g, Oral, Daily, Noemy Mcpherson PA-C    potassium chloride 20 mEq IVPB (premix), 20 mEq, Intravenous, Once, Noemy Mcpherson PA-C    QUEtiapine (SEROquel) tablet 50 mg, 50 mg, Oral, HS, RANDEE Chavarria, 50 mg at 11/28/23 2126    senna oral syrup 17.6 mg, 17.6 mg, Oral, BID, Noemy Mcpherson PA-C, 17.6 mg at 11/29/23 1703    sertraline (ZOLOFT) tablet 75 mg, 75 mg, Per PEG Tube, Daily, RANDEE Chavarria, 75 mg at 11/29/23 0843    sulfamethoxazole-trimethoprim (BACTRIM DS) 800-160 mg per tablet 1 tablet, 1 tablet, Oral, Once per day on Mon Wed Fri, Lynette Laughlin MD, 1 tablet at 11/29/23 0844    Medications Prior to Admission   Medication    acetaminophen (TYLENOL) 650 mg CR tablet    albuterol (PROVENTIL HFA,VENTOLIN HFA) 90 mcg/act inhaler    amLODIPine (NORVASC) 10 mg tablet    ASPIRIN-ACETAMINOPHEN-CAFFEINE PO    Calcium Carb-Cholecalciferol (OSCAL-D) 500 mg-200 units per tablet    carvedilol (COREG) 25 mg tablet    diclofenac sodium (VOLTAREN) 1 %    EPINEPHrine (EPIPEN) 0.3 mg/0.3 mL SOAJ    famotidine (PEPCID) 20 mg tablet    fluticasone (FLONASE) 50 mcg/act nasal spray    fluticasone-umeclidinium-vilanterol (Trelegy Ellipta) 100-62.5-25 mcg/actuation inhaler    gabapentin (NEURONTIN) 600 MG tablet    loratadine (CLARITIN) 10 mg tablet    miconazole (MONISTAT-7) 2 % vaginal cream    naloxone (NARCAN) 4 mg/0.1 mL nasal spray    nicotine (NICODERM CQ) 7 mg/24hr TD 24 hr patch    NON FORMULARY    oxyCODONE (OXY-IR) 5 MG capsule    sertraline (ZOLOFT) 50 mg tablet       Allergies   Allergen Reactions    Methyldopa Shortness Of Breath and Other (See Comments)     Aldomet         Physical Exam:    /61   Pulse 86   Temp 98 °F (36.7 °C) (Oral)   Resp 12   Ht 5' (1.524 m)   Wt 78.3 kg (172 lb 9.9 oz)   SpO2 97%   BMI 33.71 kg/m²     Physical Exam  Constitutional:       General: She is not in acute distress. Appearance: She is not ill-appearing. HENT:      Head: Normocephalic and atraumatic. Right Ear: External ear normal.      Left Ear: External ear normal.      Nose: Nose normal.      Mouth/Throat:      Mouth: Mucous membranes are moist.      Pharynx: No oropharyngeal exudate. Eyes:      General: No scleral icterus. Right eye: No discharge. Left eye: No discharge.       Conjunctiva/sclera: Conjunctivae normal.   Neck: Comments: + trach and humifdiied oxygen support  Cardiovascular:      Rate and Rhythm: Normal rate. Pulmonary:      Effort: Pulmonary effort is normal. No respiratory distress. Abdominal:      General: There is no distension. Musculoskeletal:         General: Normal range of motion. Right lower leg: No edema. Left lower leg: No edema. Skin:     General: Skin is warm. Coloration: Skin is not jaundiced. Findings: No lesion. Neurological:      Mental Status: She is oriented to person, place, and time. Cranial Nerves: No cranial nerve deficit. Psychiatric:         Mood and Affect: Mood normal.         Behavior: Behavior normal.         Thought Content:  Thought content normal.         Judgment: Judgment normal.          Latest Reference Range & Units 11/29/23 05:16 11/29/23 13:15   Sodium 135 - 147 mmol/L 140    Potassium 3.5 - 5.3 mmol/L 3.2 (L)    Chloride 96 - 108 mmol/L 98    CO2 21 - 32 mmol/L 33 (H)    Anion Gap mmol/L 9    BUN 5 - 25 mg/dL 23    Creatinine 0.60 - 1.30 mg/dL 0.79    Glucose, Random 65 - 140 mg/dL 141 (H)    Calcium 8.4 - 10.2 mg/dL 9.5    CORRECTED CALCIUM 8.3 - 10.1 mg/dL 10.0    AST 13 - 39 U/L 26    ALT 7 - 52 U/L 29    Alkaline Phosphatase 34 - 104 U/L 56    Total Protein 6.4 - 8.4 g/dL 6.3 (L)    Albumin 3.5 - 5.0 g/dL 3.4 (L)    TOTAL BILIRUBIN 0.20 - 1.00 mg/dL 0.46    eGFR ml/min/1.73sq m 76    Phosphorus 2.3 - 4.1 mg/dL 2.4    Magnesium 1.9 - 2.7 mg/dL 1.3 (L) 2.9 (H)   WBC 4.31 - 10.16 Thousand/uL 4.46    Red Blood Cell Count 3.81 - 5.12 Million/uL 3.47 (L)    Hemoglobin 11.5 - 15.4 g/dL 10.2 (L)    HCT 34.8 - 46.1 % 31.0 (L)    MCV 82 - 98 fL 89    MCH 26.8 - 34.3 pg 29.4    MCHC 31.4 - 37.4 g/dL 32.9    RDW 11.6 - 15.1 % 15.1    Platelet Count 778 - 390 Thousands/uL 347    MPV 8.9 - 12.7 fL 8.9    nRBC /100 WBCs 0    Calcium, Ionized 1.12 - 1.32 mmol/L 1.21    (L): Data is abnormally low  (H): Data is abnormally high      XR chest 1 view portable    Result Date: 11/25/2023  Narrative: CHEST INDICATION:   shortness of breath with desaturation. . COMPARISON: CXR and chest CT 11/22/2023. EXAM PERFORMED/VIEWS:  XR CHEST PORTABLE. FINDINGS: Tracheostomy. Right port in lower SVC. Mild cardiomegaly. Moderate pulmonary edema and emphysema. No definite effusion. No pneumothorax. Upper abdomen normal. Bones normal for age. Impression: Moderate pulmonary edema. Pneumonia not excluded in the appropriate clinical setting. Workstation performed: [id number]     CT chest wo contrast    Result Date: 11/22/2023  Narrative: CT CHEST WITHOUT IV CONTRAST INDICATION:   Dyspnea, chronic, chest wall or pleura disease suspected shortness of breath. COMPARISON: Compared with 11/3/2023 TECHNIQUE: CT examination of the chest was performed without intravenous contrast. Multiplanar 2D reformatted images were created from the source data. This examination, like all CT scans performed in the [facility], was performed utilizing techniques to minimize radiation dose exposure, including the use of iterative reconstruction and automated exposure control. Radiation dose length product (DLP) for this visit:  318 mGy-cm FINDINGS: LUNGS: Moderate to severe emphysematous changes. Patchy parenchymal opacity in the lingula and left lower lobe posteriorly. Decreased left lung volume. There is no tracheal or endobronchial lesion. Tracheotomy tube seen. PLEURA:  Unremarkable. HEART/GREAT VESSELS: Heart is unremarkable for patient's age. No thoracic aortic aneurysm. Dilated main pulmonary artery measuring 4.3 cm. MEDIASTINUM AND PRASANTH: Subcentimeter right paratracheal and precarinal lymph nodes. CHEST WALL AND LOWER NECK:  Unremarkable. VISUALIZED STRUCTURES IN THE UPPER ABDOMEN: Gastrostomy tube in place with contrast opacified balloon in the lumen. Simple right renal cyst. OSSEOUS STRUCTURES: Lytic area in the T12 vertebral body along the inferior endplate is unchanged. Lucent focus in the left glenoid unchanged. Impression: Breathing motion artifacts and emphysematous changes limit evaluation. Infiltrates in the lingula and left lower lobe suggested. Improved from prior study. Workstation performed: [id number]     XR chest portable ICU    Result Date: 11/22/2023  Narrative: CHEST INDICATION:   hypoxia. COMPARISON: Chest x-ray November 20, 2023 EXAM PERFORMED/VIEWS:  XR CHEST PORTABLE ICU FINDINGS: Tracheostomy tube is in satisfactory position. Right central venous catheter terminates in the superior vena cava. Cardiomegaly is demonstrated. Stable interstitial opacities are seen which could reflect edema or interstitial infiltrate with patchy opacities in the lower lobes, worse on the left. Bony structures are unchanged     Impression: Stable exam. Workstation performed: [id number]     XR chest portable ICU    Result Date: 11/21/2023  Narrative: CHEST INDICATION:  Status post trach and vent at night, SOB. COMPARISON: 11/10/2023, CT chest 11/3/2023 EXAM PERFORMED/VIEWS:  XR CHEST PORTABLE ICU FINDINGS: -Tracheostomy tube projects in satisfactory radiographic position. -Right IJ central line tip terminates in the region of the SVC. Cardiomediastinal silhouette appears stable. Diffuse interstitial and groundglass opacities throughout the lungs, unchanged. No pneumothorax. Osseous structures appear within normal limits for patient age. Impression: Diffuse interstitial and groundglass opacities throughout the lungs, unchanged. Findings could be infectious/inflammatory and/or secondary to edema. Workstation performed: [id number]     Echo follow up/limited w/ contrast if indicated    Result Date: 11/21/2023  Narrative:   Left Ventricle: Left ventricular cavity size is normal. Wall thickness is severely increased. The left ventricular ejection fraction is 65%.  Systolic function is normal. Wall motion is normal. Diastolic function is abnormal.  Left atrial filling pressure is elevated. Right Ventricle: Right ventricular cavity size is normal. Systolic function is normal.   Atrial Septum: No patent foramen ovale detected, confirmed at rest and with provcation using agitated saline contrast.   Mitral Valve: There is moderate regurgitation. There is systolic anterior motion of the anterior leaflet without late peaking gradient at rest.   Pulmonic Valve: There is mild regurgitation. CT soft tissue neck w contrast    Result Date: 11/15/2023  Narrative: CT NECK WITH CONTRAST INDICATION:   Head/neck cancer, assess treatment response Assess response to chemo in terms of size of malignant neck mass. History of B-cell lymphoma. COMPARISON: 10/13/2023 TECHNIQUE:  Axial, sagittal, and coronal 2D reformatted images were created from the axial source data and submitted for interpretation. Radiation dose length product (DLP) for this visit:  524 mGy-cm . This examination, like all CT scans performed in the [facility], was performed utilizing techniques to minimize radiation dose exposure, including the use of iterative reconstruction and automated exposure control. IV Contrast:  85 mL of iohexol (OMNIPAQUE) IMAGE QUALITY:  Diagnostic. FINDINGS: VISUALIZED BRAIN PARENCHYMA:  No acute intracranial pathology of the visualized brain parenchyma. VISUALIZED ORBITS: Normal visualized orbits. PARANASAL SINUSES: There are retention cyst versus polyps within the sphenoid sinuses. Small amount of fluid in the left mastoid tip. NASAL CAVITY AND NASOPHARYNX: There has been interval decrease in size of the mass centered within the left nasopharynx. There is persistent but decreased effacement of the left fossa of Rosenmuller as well as eustachian tube orifice. Disease extends inferiorly along the left lateral oropharyngeal wall as well as along the left parapharyngeal space.  For reference the ill-defined mass measures approximately 2.5 x 3.0 cm on series 2, image 22 and previously measured approximately 3.6 x 3.5 cm when measured at the same level on the prior exam. There is improved extension of disease into the left infratemporal fossa and  space as well as prevertebral space. Persistent fat planes along the expected course of V3 as it exits foramen ovale. No  definite asymmetric widening of foramen ovale or regional erosive change. Soft tissue extends posterolaterally with persistent encasement of the left internal carotid artery however overall improved. SUPRAHYOID NECK: Please see above. INFRAHYOID NECK:  Aryepiglottic folds and piriform sinuses are normal.  Normal glottis and subglottic airway. THYROID GLAND:  Unremarkable. PAROTID AND SUBMANDIBULAR GLANDS:  Normal. LYMPH NODES: Please see above. No new adenopathy is identified. VASCULAR STRUCTURES: Partially imaged right-sided Mediport. There is thrombus noted within the right internal jugular vein just above the Mediport entrance site into the internal jugular vein. THORACIC INLET: Status post tracheostomy. Emphysema. Improved previously seen consolidation and layering effusions. BONY STRUCTURES: No acute fracture or destructive osseous lesion. Impression: Improved ill-defined mass centered along the left nasopharynx and oropharynx with regional extension of disease as described above. Findings consistent with the clinical history of lymphoma. No new suspicious adenopathy identified. Status post right-sided Mediport placement. Nonocclusive thrombus is seen within the right internal jugular vein just above the entrance site of the Mediport catheter into the internal jugular vein. Improved previously seen consolidation and effusion in the visualized lung fields. The study was marked in [facility] for immediate notification. Workstation performed: [id number]     FL barium swallow video w speech    Result Date: 11/14/2023  Narrative: A video barium swallow study was performed by the Department of Speech Pathology.  Please refer to the report for the official interpretation. The images are stored for archival purposes only. Study images were not formally reviewed by the Radiology Department. XR chest portable ICU    Result Date: 11/11/2023  Narrative: CHEST INDICATION:   Desatruation and hypoxia, evaluation for intratrhoacic pathology. COMPARISON: 11/2/2023 EXAM PERFORMED/VIEWS:  XR CHEST PORTABLE ICU FINDINGS: Tracheostomy tube tip terminates approximately 1 cm from sujata. Right  sided central venous catheter tip near cavoatrial junction. Persistent dense  left  lung base opacity may represent a combination of effusion and parenchymal opacity. Improved diffuse bilateral hazy/groundglass opacity. No pneumothorax. Stable cardiomediastinal silhouette. No large right effusion. Impression: Persistent dense  left  lung base opacity may represent a combination of effusion and parenchymal opacity. Improved diffuse bilateral hazy/groundglass opacity. Workstation performed: [id number]     Bronchoscopy    Result Date: 11/6/2023  Narrative: Table formatting from the original result was not included. [address] [place] [address] Endoscopy [address] [place] [address] [phone] DATE OF SERVICE: 11/06/23 PHYSICIAN(S): Attending: Nba Matias MD Fellow: Bob Peñaloza MD INDICATION: Acute respiratory failure with hypoxia ([address]) POST-OP DIAGNOSIS: See the impression below. PREPROCEDURE: Standard airway preparation completed per respiratory therapy protocol. Informed consent was obtained. Images reviewed prior to the procedure. A Time Out was performed. No suspicion or identified risk for TB or other airborne infectious disease; bronchoscopy procedure being performed for diagnostic purposes. PROCEDURE: Bronchoscopy DETAILS OF PROCEDURE: Procedure was performed at bedside in the intensive care unit. A time out was performed to confirm correct patient and correct procedure.  The patient underwent moderate sedation, which was administered by an intensivist. The patient's blood pressure, ECG, heart rate, oxygen and respirations were monitored throughout the procedure. The patient experienced no blood loss. The scope was introduced through the tracheostomy tube. The procedure was not difficult. The patient tolerated the procedure well. There were no apparent adverse events. ANESTHESIA INFORMATION: ASA: ASA status not filed in the log. Anesthesia Type: Anesthesia type not filed in the log.  FINDINGS: The upper trachea, middle trachea, lower trachea, main sujata, left main stem, left apical-posterior segment (LB1+2), left upper anterior segment (LB3), superior lingular segment (LB4), inferior lingular segment (LB5), left superior segment (LB6), left anterior basal segment (LB7+8), left lateral basal segment (LB9), left posterior basal segment (LB10), right main stem, right apical segment (RB1), right posterior segment (RB2), right anterior segment (RB3), bronchus intermedius, right lateral segment (RB4), right medial segment (RB5), right anterior basal segment (RB8), right lateral basal segment (RB9) and right posterior basal segment (RB10) appeared normal. Bronchoalveolar lavage was performed x3 in the right medial segment (RB5) with 180 mL of saline instilled and a total return of 60 mL; the fluid appeared cloudy Minimal, thin and clear secretions present in the left apical-posterior segment (LB1+2), left upper anterior segment (LB3), superior lingular segment (LB4), inferior lingular segment (LB5), left superior segment (LB6), left anterior basal segment (LB7+8), left lateral basal segment (LB9), left posterior basal segment (LB10), right lateral segment (RB4), right medial segment (RB5), right superior segment (RB6), right medial basal segment (RB7), right anterior basal segment (RB8), right lateral basal segment (RB9) and right posterior basal segment (RB10); secretions were easily removed by therapeutic aspiration and the airway was cleared; performed washing, sample sent for cytology and microbiology analysis Performed 1 brushing with cytology and microbiology brushes in the right superior segment (RB6) SPECIMENS: ID Type Source Tests Collected by Time Destination 1 :  Washing Lung, Right Washing PULMONARY CYTOLOGY Deandre Carrasco MD 11/6/2023 1423  2 :  Lavage Lung, Right Middle Lobe Bronchoalveolar Lavage PULMONARY CYTOLOGY Deandre Carrasoc MD 11/6/2023 1415  A :  Bronchial Lung, Right Middle Lobe Bronchoalveolar Lavage FUNGAL CULTURE, VIRUS CULTURE, BRONCHIAL CULTURE AND GRAM STAIN, LEGIONELLA CULTURE, PNEUMOCYSTIS SMEAR BY DFA ([facility]), AFB CULTURE WITH STAIN Deandre Carrasco MD 11/6/2023 1411  B :  Bronchial Lung, Right Lower Lobe Bronchial Washing FUNGAL CULTURE, VIRUS CULTURE, BRONCHIAL CULTURE AND GRAM STAIN, LEGIONELLA CULTURE, PNEUMOCYSTIS SMEAR BY DFA ([facility]), AFB CULTURE WITH STAIN Deandre Carrasco MD 11/6/2023 1421       Impression: Thick secretions in the inner canula of the tracheostomy. This was replaced for a new inner canula. Minimal amount of clear, thin secretions in bilateral airways. No evidence of mucous plug. No evidence of endobronchial lesions or obstructions. BAL of RML performed as well as brushing of RB-6. RECOMMENDATION: Follow-up all cultures and cytology from wash and from BAL Follow up culture results from [facility] Continue management in the intensive care unit. Can resume tube feeds and PO diet once awake. Sadie Ace MD Pulmonary and Critical Care Fellow, PGY-5 [facility] I personally supervised, assisted in, and/or performed the entirety of this procedure. Catina Dowell MD     CT chest wo contrast    Result Date: 11/4/2023  Narrative: CT CHEST WITHOUT IV CONTRAST INDICATION:   hypoxia. Acute hypoxic respiratory failure. Recently diagnosed large B-cell lymphoma.  COMPARISON: 10/12/2023 TECHNIQUE: CT examination of the chest was performed without intravenous contrast. Multiplanar 2D reformatted images were created from the source data. This examination, like all CT scans performed in the [facility], was performed utilizing techniques to minimize radiation dose exposure, including the use of iterative reconstruction and automated exposure control. Radiation dose length product (DLP) for this visit:  421 mGy-cm FINDINGS: LUNGS: Pulmonary emphysema noted, with superimposed diffuse alveolar and interstitial airspace opacities. Compared to the most recent examination, little change has occurred in the upper lung fields. There is partially diminished atelectasis at the lung bases, possibly related to the diminished size of the effusions. When the lung disease is compared to earlier studies such as 9/13/2023, the process remains significantly worsened PLEURA: Trace right effusion, but overall diminished size of the pleural effusions. No pneumothorax. HEART/GREAT VESSELS: No pericardial effusion. Cardiomegaly. Significant enlargement of the main pulmonary artery, diameter 4.7 cm earlier 3.9 cm. Central line tip is seen in the upper right atrium. MEDIASTINUM AND PRASANTH: No progressive adenopathy. Tracheotomy tube positioning appropriate. CHEST WALL AND LOWER NECK:  Unremarkable. VISUALIZED STRUCTURES IN THE UPPER ABDOMEN: Partial visualization of a incompletely assessed hypodense right kidney upper pole nodule OSSEOUS STRUCTURES: Chronic but enlarged (since 2020) lucent defect in the T12 vertebral body with pathologic fracture. Comparing to 8/25/2023, no change     Impression: 1. Persistent severe bilateral alveolar and interstitial opacities superimposed upon pulmonary emphysema. For the most part, this is unchanged from the most recent prior study although there is somewhat less atelectasis at the lung bases perhaps related to diminished size of previously seen pleural effusions.  When the airspace disease is compared to earlier examinations for example 9/13/2023, significant worsening is again noted 2. No pneumothorax 3. Markedly enlarged main pulmonary artery consistent with pulmonary arterial hypertension. Workstation performed: [id number]     XR chest portable ICU    Result Date: 11/3/2023  Narrative: CHEST INDICATION:   hypoxia. COMPARISON: 10/29/2023 EXAM PERFORMED/VIEWS:  XR CHEST PORTABLE ICU Images: 2 FINDINGS: Cardiomediastinal silhouette appears enlarged. Tracheostomy tube is midline. Right-sided central catheter unchanged. Diffuse abnormal interstitial and alveolar disease appears slightly worse. No pneumothorax. No pleural effusion. Osseous structures appear within normal limits for patient age. Impression: Diffuse abnormal interstitial and alveolar disease appears worse. Correlate for multifocal pneumonia, heart failure, ARDS. Workstation performed: [id number]     XR chest portable    Result Date: 10/30/2023  Narrative: CHEST INDICATION:   hypoxia. COMPARISON: 10/27/2023 EXAM PERFORMED/VIEWS:  XR CHEST PORTABLE FINDINGS: Right  sided central venous catheter tip near superior cavoatrial junction. Tracheostomy tube noted. Persistent dense  left  lung base opacity may represent a combination of effusion and parenchymal opacity. Persistent bilateral diffuse interstitial and hazy airspace/groundglass opacity. No pneumothorax. Stable cardiomediastinal silhouette. Impression: Persistent dense  left  lung base opacity may represent a combination of effusion and parenchymal opacity. Persistent bilateral diffuse interstitial and hazy airspace/groundglass opacity. Workstation performed: [id number]       Labs and pertinent reports reviewed. This note has been generated by voice recognition software system. Therefore, there may be spelling, grammar, and or syntax errors. Please contact if questions arise.     Orlin Love MD, PhD

## 2023-11-29 NOTE — PLAN OF CARE
Problem: MOBILITY - ADULT  Goal: Maintain or return to baseline ADL function  Description: INTERVENTIONS:  -  Assess patient's ability to carry out ADLs; assess patient's baseline for ADL function and identify physical deficits which impact ability to perform ADLs (bathing, care of mouth/teeth, toileting, grooming, dressing, etc.)  - Assess/evaluate cause of self-care deficits   - Assess range of motion  - Assess patient's mobility; develop plan if impaired  - Assess patient's need for assistive devices and provide as appropriate  - Encourage maximum independence but intervene and supervise when necessary  - Involve family in performance of ADLs  - Assess for home care needs following discharge   - Consider OT consult to assist with ADL evaluation and planning for discharge  - Provide patient education as appropriate  Outcome: Progressing  Goal: Maintains/Returns to pre admission functional level  Description: INTERVENTIONS:  - Perform BMAT or MOVE assessment daily.   - Set and communicate daily mobility goal to care team and patient/family/caregiver.    - Collaborate with rehabilitation services on mobility goals if consulted  - Out of bed for toileting  - Record patient progress and toleration of activity level   Outcome: Progressing     Problem: Prexisting or High Potential for Compromised Skin Integrity  Goal: Skin integrity is maintained or improved  Description: INTERVENTIONS:  - Identify patients at risk for skin breakdown  - Assess and monitor skin integrity  - Assess and monitor nutrition and hydration status  - Monitor labs   - Assess for incontinence   - Turn and reposition patient  - Assist with mobility/ambulation  - Relieve pressure over bony prominences  - Avoid friction and shearing  - Provide appropriate hygiene as needed including keeping skin clean and dry  - Evaluate need for skin moisturizer/barrier cream  - Collaborate with interdisciplinary team   - Patient/family teaching  - Consider wound care consult   Outcome: Progressing     Problem: Nutrition/Hydration-ADULT  Goal: Nutrient/Hydration intake appropriate for improving, restoring or maintaining nutritional needs  Description: Monitor and assess patient's nutrition/hydration status for malnutrition. Collaborate with interdisciplinary team and initiate plan and interventions as ordered. Monitor patient's weight and dietary intake as ordered or per policy. Utilize nutrition screening tool and intervene as necessary. Determine patient's food preferences and provide high-protein, high-caloric foods as appropriate.      INTERVENTIONS:  - Monitor oral intake, urinary output, labs, and treatment plans  - Assess nutrition and hydration status and recommend course of action  - Evaluate amount of meals eaten  - Assist patient with eating if necessary   - Allow adequate time for meals  - Recommend/ encourage appropriate diets, oral nutritional supplements, and vitamin/mineral supplements  - Order, calculate, and assess calorie counts as needed  - Recommend, monitor, and adjust tube feedings and TPN/PPN based on assessed needs  - Assess need for intravenous fluids  - Provide specific nutrition/hydration education as appropriate  - Include patient/family/caregiver in decisions related to nutrition  Outcome: Progressing     Problem: PAIN - ADULT  Goal: Verbalizes/displays adequate comfort level or baseline comfort level  Description: Interventions:  - Encourage patient to monitor pain and request assistance  - Assess pain using appropriate pain scale  - Administer analgesics based on type and severity of pain and evaluate response  - Implement non-pharmacological measures as appropriate and evaluate response  - Consider cultural and social influences on pain and pain management  - Notify physician/advanced practitioner if interventions unsuccessful or patient reports new pain  Outcome: Progressing     Problem: INFECTION - ADULT  Goal: Absence or prevention of progression during hospitalization  Description: INTERVENTIONS:  - Assess and monitor for signs and symptoms of infection  - Monitor lab/diagnostic results  - Monitor all insertion sites, i.e. indwelling lines, tubes, and drains  - Monitor endotracheal if appropriate and nasal secretions for changes in amount and color  - Mason appropriate cooling/warming therapies per order  - Administer medications as ordered  - Instruct and encourage patient and family to use good hand hygiene technique  - Identify and instruct in appropriate isolation precautions for identified infection/condition  Outcome: Progressing  Goal: Absence of fever/infection during neutropenic period  Description: INTERVENTIONS:  - Monitor WBC    Outcome: Progressing     Problem: SAFETY ADULT  Goal: Maintain or return to baseline ADL function  Description: INTERVENTIONS:  -  Assess patient's ability to carry out ADLs; assess patient's baseline for ADL function and identify physical deficits which impact ability to perform ADLs (bathing, care of mouth/teeth, toileting, grooming, dressing, etc.)  - Assess/evaluate cause of self-care deficits   - Assess range of motion  - Assess patient's mobility; develop plan if impaired  - Assess patient's need for assistive devices and provide as appropriate  - Encourage maximum independence but intervene and supervise when necessary  - Involve family in performance of ADLs  - Assess for home care needs following discharge   - Consider OT consult to assist with ADL evaluation and planning for discharge  - Provide patient education as appropriate  Outcome: Progressing  Goal: Maintains/Returns to pre admission functional level  Description: INTERVENTIONS:  - Perform BMAT or MOVE assessment daily.   - Set and communicate daily mobility goal to care team and patient/family/caregiver.    - Collaborate with rehabilitation services on mobility goals if consulted  - Out of bed for toileting  - Record patient progress and toleration of activity level   Outcome: Progressing  Goal: Patient will remain free of falls  Description: INTERVENTIONS:  - Educate patient/family on patient safety including physical limitations  - Instruct patient to call for assistance with activity   - Consult OT/PT to assist with strengthening/mobility   - Keep Call bell within reach  - Keep bed low and locked with side rails adjusted as appropriate  - Keep care items and personal belongings within reach  - Initiate and maintain comfort rounds  - Make Fall Risk Sign visible to staff  - Apply yellow socks and bracelet for high fall risk patients  - Consider moving patient to room near nurses station  Outcome: Progressing     Problem: DISCHARGE PLANNING  Goal: Discharge to home or other facility with appropriate resources  Description: INTERVENTIONS:  - Identify barriers to discharge w/patient and caregiver  - Arrange for needed discharge resources and transportation as appropriate  - Identify discharge learning needs (meds, wound care, etc.)  - Arrange for interpretive services to assist at discharge as needed  - Refer to Case Management Department for coordinating discharge planning if the patient needs post-hospital services based on physician/advanced practitioner order or complex needs related to functional status, cognitive ability, or social support system  Outcome: Progressing     Problem: Knowledge Deficit  Goal: Patient/family/caregiver demonstrates understanding of disease process, treatment plan, medications, and discharge instructions  Description: Complete learning assessment and assess knowledge base.   Interventions:  - Provide teaching at level of understanding  - Provide teaching via preferred learning methods  Outcome: Progressing     Problem: RESPIRATORY - ADULT  Goal: Achieves optimal ventilation and oxygenation  Description: INTERVENTIONS:  - Assess for changes in respiratory status  - Assess for changes in mentation and behavior  - Position to facilitate oxygenation and minimize respiratory effort  - Oxygen administered by appropriate delivery if ordered  - Initiate smoking cessation education as indicated  - Encourage broncho-pulmonary hygiene including cough, deep breathe, Incentive Spirometry  - Assess the need for suctioning and aspirate as needed  - Assess and instruct to report SOB or any respiratory difficulty  - Respiratory Therapy support as indicated  Outcome: Progressing     Problem: GENITOURINARY - ADULT  Goal: Maintains or returns to baseline urinary function  Description: INTERVENTIONS:  - Assess urinary function  - Encourage oral fluids to ensure adequate hydration if ordered  - Administer IV fluids as ordered to ensure adequate hydration  - Administer ordered medications as needed  - Offer frequent toileting  - Follow urinary retention protocol if ordered  Outcome: Progressing  Goal: Absence of urinary retention  Description: INTERVENTIONS:  - Assess patient’s ability to void and empty bladder  - Monitor I/O  - Bladder scan as needed  - Discuss with physician/AP medications to alleviate retention as needed  - Discuss catheterization for long term situations as appropriate  Outcome: Progressing     Problem: METABOLIC, FLUID AND ELECTROLYTES - ADULT  Goal: Electrolytes maintained within normal limits  Description: INTERVENTIONS:  - Monitor labs and assess patient for signs and symptoms of electrolyte imbalances  - Administer electrolyte replacement as ordered  - Monitor response to electrolyte replacements, including repeat lab results as appropriate  - Instruct patient on fluid and nutrition as appropriate  Outcome: Progressing  Goal: Fluid balance maintained  Description: INTERVENTIONS:  - Monitor labs   - Monitor I/O and WT  - Instruct patient on fluid and nutrition as appropriate  - Assess for signs & symptoms of volume excess or deficit  Outcome: Progressing  Goal: Glucose maintained within target range  Description: INTERVENTIONS:  - Monitor Blood Glucose as ordered  - Assess for signs and symptoms of hyperglycemia and hypoglycemia  - Administer ordered medications to maintain glucose within target range  - Assess nutritional intake and initiate nutrition service referral as needed  Outcome: Progressing

## 2023-11-29 NOTE — PROGRESS NOTES
[facility]  Progress Note  Name: Radha Garcia  MRN: [id number]  Unit/Bed#: ICU 03 I Date of Admission: 9/13/2023   Date of Service: 11/29/2023 I Hospital Day: 68    Assessment/Plan   * Acute respiratory failure with hypoxia secondary to oropharyngeal mass  Assessment & Plan  Cuffless trach placed on 9/17. Transitioned to cuffed trach on 10/3. Respiratory breathing has been challenging in the setting of anxiety given recent events. Repeat imaging does not show improvement in bilateral consolidation or atelectasis and groundglass opacities on repeat. S/p bronch. Bronchial cx with 3 colonies CoNS. Completed 7 day course of IV abx w/ cefepime and Vancomycin. Bronchial culture and gram stain negative. Micro negative for CMV, AFB, Fungitell, Pneumocystis jiroveci (carinii), Histoplasma, Aspergillus. Trach secretions sent for sputum culture shows 2+ polys, 1+ gram-positive cocci in pairs, chains and clusters and 1+ gram-negative rods. Cuffless trach was placed 9/17. Replaced with a cuffed Shiley XLT #7 10/3. Has been tolerating nightly BiPAP via trach w/ settings EPAP 8, PSV 4. Etiology: Consider drug induced pneumonitis in the setting of chemo 2/2 newly dx lymphoma. CT chest showed Infiltrates in the lingula and left lower lobe. Improved from prior study   Working with case management to qualify patient for home vent. Plan:  Continue w/ BIPAP via trach at night. EPAP 8, PSV 4. Continue to titrate O2 to maintain SpO2 >85%  Aggressive pulmonary toilet   Incentive Spirometry   Suctioning via trach if patient is desatting and unable to expectorate phlegm. Continue w/ guaifenesin, Atrovent and Xopenex for airway maintenance. Continue inhalation treatment with Pulmicort and formoterol q12 , duonebs QID, atrovent and xopenex   Regular diet  Daily PT/OT    Large cell lymphoma (HCC)  Assessment & Plan  Large B-cell lymphoma, stage II. First chemo cycle 9/22 4 days of R-EPOCH. 9/27 Rituxan. 9/28 Filgrastim. Received nivestym 300 mc 7 days dcd on 10/26   Discontinue Prophylaxis as per Heme Oncology recommendations  Pt had hyperkalemia of 5.5, can be due to Bactrim, consider switching to atovaquone      9/22  4 days of R-EPOCH.  9/27 Rituxan  10/13 for [facility] cycle 2, which was done over 4 days and cytoxan on 10/19.  11/6 completed R-EPOCH cycle 3 and Cytoxan 11/7. Plan:  Continue with current chemo treatment 11/24-11/28. Continue Bactrim, Acyclovir, Allopurinol for prophylaxis   Transfuse blood products as needed. Keep Hgb>7 and Plt>20 for chemo   See acute respiratory failure above    Acute embolism and thrombosis of right internal jugular vein (HCC)  Assessment & Plan  CT soft tissues of the neck: Nonocclusive thrombus noted within the right internal jugular vein just above the Mediport entrance site into the internal jugular vein. No signs of pain, headaches, vision changes. Plan:  Continue w/ home Eliquis 5 mg     Oropharyngeal mass  Assessment & Plan  9/14 Neck/ soft tissue CT: Large enhancing soft tissue mass identified within the left neck involving multiple spaces. Centered within the left oropharynx with extensions as described above into multiple spaces. This results in significant airway compromise. 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 Small level 3/level 4 nodes are seen within the neck. Tracheostomy by ENT. Replaced on 9/26. H/o tobacco abuse, daily smoker. On nicotine while inpatient, confirmed with patient she needs nicotine patch. CT soft tissue neck shows reduced mass effect from 9/14 to 10/13. Can consider reducing trach size if continues to improve. Patient often refusing peg tube feeds but is feeding herself small meals orally. Plan:  ENT and heme onc following  Pt receiving her 4th cycle of chemotherapy - Day 5 (11/28)  As per speech therapist diet  Dysphagia 2. Continue parallel PEG tube feeds for nutrition.    See acute respiratory failure and large B cell lymphoma above. Pancytopenia due to chemotherapy [facility])  Assessment & Plan  Recent Labs     11/27/23  0640 11/28/23  0524 11/29/23  0516   HGB 10.2* 9.7* 10.2*       '  Patient received 1 PRBC transfusion on 10/5 and 10/25. Her B/L hemoglobin is 12-14. No sites of bleeding, no black stools. Iron panel indicative of inflammatory anemia. Normal Vitamin B12 levels. Folate low at 5.5. Filgrastim 4 doses administered by heme/onc. Hb declined from 9.9 to 7.4, pt received 1 PRBC transfusion (11/24)    Plan:  Trend hemoglobin and transfuse for <7. As per heme/onc transfuse irradiated and leukoreduced blood products. Trend platelets  Folic acid 1mg daily supplementation  As per conversation with Heme/oncology attending, patient pancytopenia associated with chemotherapy is expected/predictable. No further anemia w/u required. Low ANC management as per heme/onc    Blister (nonthermal) of left forearm, sequela  Assessment & Plan  Blisters of left upper extremity healing, no signs of infection, continue daily wound care. (HFpEF) heart failure with preserved ejection fraction [facility])  Assessment & Plan  Wt Readings from Last 3 Encounters:   11/29/23 78.3 kg (172 lb 9.9 oz)   09/07/23 74.6 kg (164 lb 6.4 oz)   08/30/23 73.3 kg (161 lb 9.6 oz)     Lab Results   Component Value Date    LVEF 65 11/21/2023     (H) 10/28/2023     (H) 09/29/2023     Echo 11/21/23: LVEF 65%. Plan:  K > 4   Measure I/O    Ambulatory dysfunction  Assessment & Plan  2/2 Impacted by chronic pain syndrome + prolonged hospital stay. Continue OOB with PT. PT rec post acute rehab however reportedly Cannot get LTACH placement while getting chemo per CM  She is aware STR SNFs continue to follow and treatment can be done from a SNF level of care. PT would need to be on trach collar for at least 72 hours with no need for the vent.  Aware that pt would need to be around 28% FiO2     Depression  Assessment & Plan  Patient on Zoloft 75 daily and Seroquel hs  Patient is depressed, multiple family/palliative discussions. patient is firm that she wants to live and to fight, she is just tired. Currently goal is disease treatment oriented. Full code. No SI/HI ideations. Palliative signed off, will reconsult if patient changes her mind regarding wishes. Chronic Superficial thrombophlebitis  Assessment & Plan  Right forearm soreness. RUE US: No acute or chronic deep vein thrombosis. 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 PO not severe enough to require renal dosing at this time, will continue to monitor and adjust dosing as needed. Continue DVT ppx with Eliquis     CKD (chronic kidney disease) stage 3, GFR 30-59 ml/min [facility])  Assessment & Plan  Lab Results   Component Value Date    EGFR 76 11/29/2023    EGFR 58 11/28/2023    EGFR 48 11/27/2023    CREATININE 0.79 11/29/2023    CREATININE 0.98 11/28/2023    CREATININE 1.15 11/27/2023     Baseline Cr between 0.75-1.1  Initial PO felt 2/2 prerenal azotemia 2/2 diuresis, reduced PO intake +/- ATN. Patient received 2 doses of Bumex IV 2 g as she had not been responding appropriately to IV 40 Lasix daily. Creatinine went up by 0.5 meeting criteria for an PO. Suspect most likely due to medications as a combination of prerenal and intrinsic. FENa was 0.2%, UA shows no casts or signs of infection, urine eosinophils 0%. Patient likely has prerenal PO from volume depletion. Received 2 L NS and 1 pRBC and cr went up by 0.07 still and Na and Cl went down, suspect that volume prevented further cr rise and free water excretion impaired by kidney function, allowing hyponatremia to increase. Suspect that now that nephrotoxic medications have been stopped she responded well to Lasix and creatinine downtrended. PO now resolved. Will be cautious about nephrotoxins and monitor as restarting EPOCH and ppx.  Patient gets volume depleted and overloaded very easily. Especially from chemo. Plan:   Continue to monitor UO/renal function. Avoid nephrotoxic agents  Follow up with BMP in the morning   Continue to monitor a.m. BMP. Pain syndrome, chronic  Assessment & Plan  Home regimen: Oxycodone 5 mg BID, Tylenol 650 mg q8 prn. Gabapentin 600 mg TID. Medical marijuana. Lumbar radiculopathy and impaired ambulatory dysfunction secondary to pain. Has bowel regimen and is having bowel movements daily. Patient required additional pain management during previous cycle and has some additional pain from tunneled line placement    Plan:  Continue gabapentin 300 mg TID, oxycodone 5 mg TID, prn Tylenol 650 mg q6. Oxycodone 5mg every 8 hours  Oxycodone 2.5 mg q6 PRN    Benign essential hypertension  Assessment & Plan  Home regimen Coreg 12.5 mg BID, Amlodipine 10 mg daily, and lisinopril 40 mg. All discontinued at this time secondary to hypotension and PO since cycle 1. but stable currently without any antihypertensives. Systolic persistently elevated and tachycardic, potentially secondary to pain. Patient was more hypotensive during last chemo. Coreg contraindicated during chemo, since cycles are every other week, would be better to stick with lopressor during duration of therapy as opposed to switching constantly. Plan:  Monitor vitals. Continue Norvasc 10 and lopressor 25 bid    Protein-calorie malnutrition ([address])  Assessment & Plan  Malnutrition Findings:   Adult Malnutrition type: Chronic illness  Adult Degree of Malnutrition: Unspecified protein calorie malnutrition  Malnutrition Characteristics: Inadequate energy, Other (comment) (stress of illness)                  360 Statement: Protein calorie malnutrition r/t inadequate intake as evidenced by >7 day period of poor intakes and stress of infection; currently treated with regular diet and nutrition supplements    BMI Findings: Body mass index is 33.71 kg/m².                 Disposition: Stepdown Level 1    ICU Core Measures     Vented Patient  VAP Bundle  VAP bundle ordered     A: Assess, Prevent, and Manage Pain Has pain been assessed? Yes  Need for changes to pain regimen? No   B: Both Spontaneous Awakening Trials (SATs) and Spontaneous Breathing Trials (SBTs) Plan to perform spontaneous awakening trial today? N/A   Plan to perform spontaneous breathing trial today? N/A   Obvious barriers to extubation? NA   C: Choice of Sedation RASS Goal: 0 Alert and Calm  Need for changes to sedation or analgesia regimen? No   D: Delirium CAM-ICU: Negative   E: Early Mobility  Plan for early mobility? NA   F: Family Engagement Plan for family engagement today? NA       Antibiotic Review: Patient on appropriate coverage based on culture data. Review of Invasive Devices:      Central access plan: Medications requiring central line      Prophylaxis:  VTE VTE covered by:  apixaban, Oral, 5 mg at 11/28/23 2028       Stress Ulcer  covered byfamotidine (PEPCID) tablet 20 mg [[id number]]         Significant 24hr Events     24hr events: Pt had a 12 beat run of Non sustained Ventricular tachycardia, resolved by itself. Pt was asymptomatic, no hemodynamically instability, no chest pain, dizziness and shortness of breath. Pt tolerated pressure support ventilation well last night. Subjective   Review of Systems   Constitutional:  Negative for chills, fatigue and fever. HENT:  Positive for voice change. Eyes: Negative. Respiratory:  Negative for cough, chest tightness and shortness of breath. Cardiovascular:  Negative for chest pain, palpitations and leg swelling. Gastrointestinal:  Negative for abdominal pain, diarrhea, nausea and vomiting. Endocrine: Negative. Genitourinary: Negative. Neurological: Negative. Negative for dizziness, light-headedness and headaches. Hematological: Negative. Psychiatric/Behavioral: Negative. All other systems reviewed and are negative.      Objective Vitals I/O      Most Recent Min/Max in 24hrs   Temp 99 °F (37.2 °C) Temp  Min: 98.6 °F (37 °C)  Max: 99.1 °F (37.3 °C)   Pulse 86 Pulse  Min: 71  Max: 98   Resp 16 Resp  Min: 12  Max: 26   /83 BP  Min: 127/67  Max: 161/88   O2 Sat 96 % SpO2  Min: 91 %  Max: 98 %      Intake/Output Summary (Last 24 hours) at 11/29/2023 0705  Last data filed at 11/29/2023 0200  Gross per 24 hour   Intake 3160.5 ml   Output 2900 ml   Net 260.5 ml       Diet Regular; Regular House; Potassium 2 GM    Invasive Monitoring           Physical Exam   Physical Exam  Eyes:      General: Vision grossly intact. Extraocular Movements: Extraocular movements intact. Conjunctiva/sclera: Conjunctivae normal.      Pupils: Pupils are equal, round, and reactive to light. Skin:     General: Skin is warm. HENT:      Head: Normocephalic and atraumatic. Right Ear: Hearing normal.      Left Ear: Hearing normal.      Mouth/Throat:      Mouth: Mucous membranes are moist.   Neck:      Trachea: Tracheostomy present. Comments: tunneled DL RIJ catheter w/o purulence  Cardiovascular:      Rate and Rhythm: Normal rate and regular rhythm. Pulses: Normal pulses. Heart sounds: Normal heart sounds. Abdominal: General: Bowel sounds are normal.   Constitutional:       Appearance: She is ill-appearing. Pulmonary:      Effort: No respiratory distress. Breath sounds: Rhonchi present. Chest:      Chest wall: No tenderness. Neurological:      Mental Status: She is alert and oriented to person, place and time. Diagnostic Studies      Imaging: No imaging done in the past 24 hrs.       Medications:  Scheduled PRN   acetaminophen, 650 mg, Once  acyclovir, 400 mg, BID  allopurinol, 200 mg, 4x Daily  amLODIPine, 10 mg, Daily  apixaban, 5 mg, BID  budesonide, 0.5 mg, Q12H  busPIRone, 30 mg, TID  chlorhexidine, 15 mL, Q12H ASHLEY  diphenhydrAMINE (BENADRYL) 25 mg in sodium chloride 0.9 % 50 mL IVPB, 25 mg, Once  famotidine, 20 mg, BID  fluconazole, 741 mg, Daily  folic acid, 1 mg, Daily  formoterol, 20 mcg, Q12H  gabapentin, 300 mg, TID  levalbuterol, 1.25 mg, Q6H   And  ipratropium, 0.5 mg, Q6H  levofloxacin, 250 mg, Q24H ASHLEY  magnesium sulfate, 4 g, Once  melatonin, 3 mg, HS  metoprolol tartrate, 25 mg, Q12H [address]  nicotine, 7 mg, Daily  oxyCODONE, 5 mg, Q8H  polyethylene glycol, 17 g, Daily  potassium chloride, 20 mEq, Once  potassium chloride, 40 mEq, Once  QUEtiapine, 50 mg, HS  riTUXimab (RITUXAN) 700 mg in sodium chloride 0.9 % 280 mL subsequent titrated chemo infusion, 700 mg, Once  senna, 17.6 mg, BID  sertraline, 75 mg, Daily  sodium chloride, 20 mL/hr, Once  sulfamethoxazole-trimethoprim, 1 tablet, Once per day on Mon Wed Fri      alteplase, 2 mg, Q1MIN PRN  alteplase, 2 mg, Q1MIN PRN  alteplase, 2 mg, Q1MIN PRN  alteplase, 2 mg, Q1MIN PRN  alteplase, 2 mg, Q1MIN PRN  ondansetron, 4 mg, Q8H PRN  ondansetron, 4 mg, Q8H PRN  oxyCODONE, 2.5 mg, Q6H PRN  sodium chloride, 20 mL/hr, Daily PRN       Continuous          Labs:    CBC    Recent Labs     11/28/23 0524 11/29/23 0516   WBC 3.13* 4.46   HGB 9.7* 10.2*   HCT 30.5* 31.0*    347     BMP    Recent Labs     11/28/23 0524 11/29/23  0516   SODIUM 141 140   K 3.5 3.2*    98   CO2 30 33*   AGAP 8 9   BUN 31* 23   CREATININE 0.98 0.79   CALCIUM 9.5 9.5       Coags    No recent results     Additional Electrolytes  Recent Labs     11/29/23  0516   MG 1.3*   PHOS 2.4   CAIONIZED 1.21          Blood Gas    No recent results  No recent results LFTs  Recent Labs     11/28/23 0524 11/29/23  0516   ALT 18 29   AST 18 26   ALKPHOS 56 56   ALB 3.2* 3.4*   TBILI 0.33 0.46       Infectious  No recent results  Glucose  Recent Labs     11/28/23  0524 11/29/23  0516   GLUC 146* 141*               Danielle Corbett MD

## 2023-11-29 NOTE — ASSESSMENT & PLAN NOTE
Malnutrition Findings:   Adult Malnutrition type: Chronic illness  Adult Degree of Malnutrition: Unspecified protein calorie malnutrition  Malnutrition Characteristics: Inadequate energy, Other (comment) (stress of illness)                  360 Statement: Protein calorie malnutrition r/t inadequate intake as evidenced by >7 day period of poor intakes and stress of infection; currently treated with regular diet and nutrition supplements    BMI Findings: Body mass index is 33.71 kg/m².

## 2023-11-29 NOTE — ASSESSMENT & PLAN NOTE
Recent Labs     11/27/23  0640 11/28/23  0524 11/29/23  0516   HGB 10.2* 9.7* 10.2*       '  Patient received 1 PRBC transfusion on 10/5 and 10/25. Her B/L hemoglobin is 12-14. No sites of bleeding, no black stools. Iron panel indicative of inflammatory anemia. Normal Vitamin B12 levels. Folate low at 5.5. Filgrastim 4 doses administered by heme/onc. Hb declined from 9.9 to 7.4, pt received 1 PRBC transfusion (11/24)    Plan:  Trend hemoglobin and transfuse for <7. As per heme/onc transfuse irradiated and leukoreduced blood products. Trend platelets  Folic acid 1mg daily supplementation  As per conversation with Heme/oncology attending, patient pancytopenia associated with chemotherapy is expected/predictable. No further anemia w/u required.   Low ANC management as per heme/onc

## 2023-11-30 ENCOUNTER — TRANSCRIBE ORDERS (OUTPATIENT)
Dept: HOME HEALTH SERVICES | Facility: HOME HEALTHCARE | Age: 68
End: 2023-11-30

## 2023-11-30 DIAGNOSIS — C85.80 LYMPHOSARCOMA, MIXED CELL TYPE (HCC): ICD-10-CM

## 2023-11-30 DIAGNOSIS — J39.2 OROPHARYNGEAL MASS: Primary | ICD-10-CM

## 2023-11-30 PROBLEM — N25.89 RTA (RENAL TUBULAR ACIDOSIS): Status: ACTIVE | Noted: 2023-11-30

## 2023-11-30 PROBLEM — N25.89 RTA (RENAL TUBULAR ACIDOSIS): Status: RESOLVED | Noted: 2023-11-30 | Resolved: 2023-11-30

## 2023-11-30 LAB
ANION GAP SERPL CALCULATED.3IONS-SCNC: 8 MMOL/L
ANISOCYTOSIS BLD QL SMEAR: PRESENT
BASOPHILS # BLD MANUAL: 0 THOUSAND/UL (ref 0–0.1)
BASOPHILS NFR MAR MANUAL: 0 % (ref 0–1)
BUN SERPL-MCNC: 25 MG/DL (ref 5–25)
CALCIUM SERPL-MCNC: 9.4 MG/DL (ref 8.4–10.2)
CHLORIDE SERPL-SCNC: 98 MMOL/L (ref 96–108)
CO2 SERPL-SCNC: 30 MMOL/L (ref 21–32)
CREAT SERPL-MCNC: 0.88 MG/DL (ref 0.6–1.3)
EOSINOPHIL # BLD MANUAL: 0 THOUSAND/UL (ref 0–0.4)
EOSINOPHIL NFR BLD MANUAL: 0 % (ref 0–6)
ERYTHROCYTE [DISTWIDTH] IN BLOOD BY AUTOMATED COUNT: 15.4 % (ref 11.6–15.1)
GFR SERPL CREATININE-BSD FRML MDRD: 66 ML/MIN/1.73SQ M
GIANT PLATELETS BLD QL SMEAR: PRESENT
GLUCOSE SERPL-MCNC: 100 MG/DL (ref 65–140)
HCT VFR BLD AUTO: 32.8 % (ref 34.8–46.1)
HGB BLD-MCNC: 10.6 G/DL (ref 11.5–15.4)
LG PLATELETS BLD QL SMEAR: PRESENT
LYMPHOCYTES # BLD AUTO: 0.93 THOUSAND/UL (ref 0.6–4.47)
LYMPHOCYTES # BLD AUTO: 9 % (ref 14–44)
MCH RBC QN AUTO: 29.3 PG (ref 26.8–34.3)
MCHC RBC AUTO-ENTMCNC: 32.3 G/DL (ref 31.4–37.4)
MCV RBC AUTO: 91 FL (ref 82–98)
MONOCYTES # BLD AUTO: 0.1 THOUSAND/UL (ref 0–1.22)
MONOCYTES NFR BLD: 1 % (ref 4–12)
NEUTROPHILS # BLD MANUAL: 9.27 THOUSAND/UL (ref 1.85–7.62)
NEUTS SEG NFR BLD AUTO: 90 % (ref 43–75)
PLATELET # BLD AUTO: 402 THOUSANDS/UL (ref 149–390)
PLATELET BLD QL SMEAR: ABNORMAL
PMV BLD AUTO: 9.6 FL (ref 8.9–12.7)
POTASSIUM SERPL-SCNC: 3.6 MMOL/L (ref 3.5–5.3)
RBC # BLD AUTO: 3.62 MILLION/UL (ref 3.81–5.12)
RBC MORPH BLD: PRESENT
SODIUM SERPL-SCNC: 136 MMOL/L (ref 135–147)
WBC # BLD AUTO: 10.3 THOUSAND/UL (ref 4.31–10.16)

## 2023-11-30 PROCEDURE — 97116 GAIT TRAINING THERAPY: CPT

## 2023-11-30 PROCEDURE — 99232 SBSQ HOSP IP/OBS MODERATE 35: CPT | Performed by: INTERNAL MEDICINE

## 2023-11-30 PROCEDURE — 94003 VENT MGMT INPAT SUBQ DAY: CPT

## 2023-11-30 PROCEDURE — 94760 N-INVAS EAR/PLS OXIMETRY 1: CPT

## 2023-11-30 PROCEDURE — 94640 AIRWAY INHALATION TREATMENT: CPT

## 2023-11-30 PROCEDURE — 94150 VITAL CAPACITY TEST: CPT

## 2023-11-30 PROCEDURE — 85027 COMPLETE CBC AUTOMATED: CPT

## 2023-11-30 PROCEDURE — 94669 MECHANICAL CHEST WALL OSCILL: CPT

## 2023-11-30 PROCEDURE — 80048 BASIC METABOLIC PNL TOTAL CA: CPT

## 2023-11-30 PROCEDURE — 85007 BL SMEAR W/DIFF WBC COUNT: CPT

## 2023-11-30 RX ORDER — LEVALBUTEROL INHALATION SOLUTION 1.25 MG/3ML
1.25 SOLUTION RESPIRATORY (INHALATION) EVERY 4 HOURS PRN
Status: DISCONTINUED | OUTPATIENT
Start: 2023-11-30 | End: 2023-12-04 | Stop reason: HOSPADM

## 2023-11-30 RX ORDER — LEVALBUTEROL INHALATION SOLUTION 1.25 MG/3ML
1.25 SOLUTION RESPIRATORY (INHALATION) ONCE
Status: DISCONTINUED | OUTPATIENT
Start: 2023-11-30 | End: 2023-12-04 | Stop reason: HOSPADM

## 2023-11-30 RX ADMIN — OXYCODONE HYDROCHLORIDE 5 MG: 5 SOLUTION ORAL at 05:13

## 2023-11-30 RX ADMIN — BUDESONIDE 0.5 MG: 0.5 INHALANT ORAL at 20:03

## 2023-11-30 RX ADMIN — CHLORHEXIDINE GLUCONATE 15 ML: 1.2 SOLUTION ORAL at 20:58

## 2023-11-30 RX ADMIN — APIXABAN 5 MG: 5 TABLET, FILM COATED ORAL at 20:59

## 2023-11-30 RX ADMIN — MELATONIN 3 MG: at 21:00

## 2023-11-30 RX ADMIN — OXYCODONE HYDROCHLORIDE 5 MG: 5 SOLUTION ORAL at 20:58

## 2023-11-30 RX ADMIN — FOLIC ACID 1 MG: 1 TABLET ORAL at 09:27

## 2023-11-30 RX ADMIN — APIXABAN 5 MG: 5 TABLET, FILM COATED ORAL at 09:27

## 2023-11-30 RX ADMIN — IPRATROPIUM BROMIDE 0.5 MG: 0.5 SOLUTION RESPIRATORY (INHALATION) at 02:20

## 2023-11-30 RX ADMIN — GABAPENTIN 300 MG: 300 CAPSULE ORAL at 09:28

## 2023-11-30 RX ADMIN — FAMOTIDINE 20 MG: 20 TABLET, FILM COATED ORAL at 09:27

## 2023-11-30 RX ADMIN — GABAPENTIN 300 MG: 300 CAPSULE ORAL at 20:59

## 2023-11-30 RX ADMIN — IPRATROPIUM BROMIDE 0.5 MG: 0.5 SOLUTION RESPIRATORY (INHALATION) at 14:13

## 2023-11-30 RX ADMIN — BUSPIRONE HYDROCHLORIDE 30 MG: 10 TABLET ORAL at 21:01

## 2023-11-30 RX ADMIN — LEVALBUTEROL HYDROCHLORIDE 1.25 MG: 1.25 SOLUTION RESPIRATORY (INHALATION) at 16:52

## 2023-11-30 RX ADMIN — ALLOPURINOL 200 MG: 100 TABLET ORAL at 09:27

## 2023-11-30 RX ADMIN — LEVOFLOXACIN 250 MG: 250 TABLET, FILM COATED ORAL at 09:27

## 2023-11-30 RX ADMIN — BUSPIRONE HYDROCHLORIDE 30 MG: 10 TABLET ORAL at 09:28

## 2023-11-30 RX ADMIN — FORMOTEROL FUMARATE DIHYDRATE 20 MCG: 20 SOLUTION RESPIRATORY (INHALATION) at 07:30

## 2023-11-30 RX ADMIN — BUDESONIDE 0.5 MG: 0.5 INHALANT ORAL at 07:30

## 2023-11-30 RX ADMIN — BUSPIRONE HYDROCHLORIDE 30 MG: 10 TABLET ORAL at 16:45

## 2023-11-30 RX ADMIN — FAMOTIDINE 20 MG: 20 TABLET, FILM COATED ORAL at 17:27

## 2023-11-30 RX ADMIN — IPRATROPIUM BROMIDE 0.5 MG: 0.5 SOLUTION RESPIRATORY (INHALATION) at 20:03

## 2023-11-30 RX ADMIN — GABAPENTIN 300 MG: 300 CAPSULE ORAL at 16:45

## 2023-11-30 RX ADMIN — LEVALBUTEROL HYDROCHLORIDE 1.25 MG: 1.25 SOLUTION RESPIRATORY (INHALATION) at 02:20

## 2023-11-30 RX ADMIN — FILGRASTIM-AAFI 300 MCG: 300 INJECTION, SOLUTION SUBCUTANEOUS at 10:34

## 2023-11-30 RX ADMIN — QUETIAPINE FUMARATE 50 MG: 25 TABLET ORAL at 21:00

## 2023-11-30 RX ADMIN — ALLOPURINOL 200 MG: 100 TABLET ORAL at 21:00

## 2023-11-30 RX ADMIN — METOPROLOL TARTRATE 25 MG: 25 TABLET, FILM COATED ORAL at 09:27

## 2023-11-30 RX ADMIN — ACYCLOVIR 400 MG: 200 CAPSULE ORAL at 09:27

## 2023-11-30 RX ADMIN — AMLODIPINE BESYLATE 10 MG: 5 TABLET ORAL at 09:27

## 2023-11-30 RX ADMIN — ACYCLOVIR 400 MG: 200 CAPSULE ORAL at 17:27

## 2023-11-30 RX ADMIN — OXYCODONE HYDROCHLORIDE 5 MG: 5 SOLUTION ORAL at 13:10

## 2023-11-30 RX ADMIN — FORMOTEROL FUMARATE DIHYDRATE 20 MCG: 20 SOLUTION RESPIRATORY (INHALATION) at 20:03

## 2023-11-30 RX ADMIN — ALLOPURINOL 200 MG: 100 TABLET ORAL at 13:10

## 2023-11-30 RX ADMIN — IPRATROPIUM BROMIDE 0.5 MG: 0.5 SOLUTION RESPIRATORY (INHALATION) at 07:30

## 2023-11-30 RX ADMIN — ALLOPURINOL 200 MG: 100 TABLET ORAL at 17:27

## 2023-11-30 RX ADMIN — SERTRALINE 75 MG: 25 TABLET, FILM COATED ORAL at 09:27

## 2023-11-30 RX ADMIN — LEVALBUTEROL HYDROCHLORIDE 1.25 MG: 1.25 SOLUTION RESPIRATORY (INHALATION) at 20:03

## 2023-11-30 RX ADMIN — LEVALBUTEROL HYDROCHLORIDE 1.25 MG: 1.25 SOLUTION RESPIRATORY (INHALATION) at 14:13

## 2023-11-30 RX ADMIN — LEVALBUTEROL HYDROCHLORIDE 1.25 MG: 1.25 SOLUTION RESPIRATORY (INHALATION) at 07:30

## 2023-11-30 RX ADMIN — FLUCONAZOLE 200 MG: 100 TABLET ORAL at 09:27

## 2023-11-30 RX ADMIN — NICOTINE 7 MG: 7 PATCH, EXTENDED RELEASE TRANSDERMAL at 09:26

## 2023-11-30 RX ADMIN — METOPROLOL TARTRATE 25 MG: 25 TABLET, FILM COATED ORAL at 20:59

## 2023-11-30 NOTE — PROGRESS NOTES
[id number] [facility]  Progress Note  Name: Nannie Gosselin  MRN: [id number]  Unit/Bed#: ICU 03 I Date of Admission: 9/13/2023   Date of Service: 11/30/2023 I Hospital Day: 66    Assessment/Plan   * Acute respiratory failure with hypoxia secondary to oropharyngeal mass  Assessment & Plan  Cuffless trach placed on 9/17. Transitioned to cuffed trach on 10/3. Respiratory breathing has been challenging in the setting of anxiety given recent events. Repeat imaging does not show improvement in bilateral consolidation or atelectasis and groundglass opacities on repeat. S/p bronch. Bronchial cx with 3 colonies CoNS. Completed 7 day course of IV abx w/ cefepime and Vancomycin. Bronchial culture and gram stain negative. Micro negative for CMV, AFB, Fungitell, Pneumocystis jiroveci (carinii), Histoplasma, Aspergillus. Trach secretions sent for sputum culture shows 2+ polys, 1+ gram-positive cocci in pairs, chains and clusters and 1+ gram-negative rods. Cuffless trach was placed 9/17. Replaced with a cuffed Shiley XLT #7 10/3. Has been tolerating nightly BiPAP via trach w/ settings EPAP 8, PSV 4. Etiology: Consider drug induced pneumonitis in the setting of chemo 2/2 newly dx lymphoma. CT chest showed Infiltrates in the lingula and left lower lobe. Improved from prior study   Working with case management to qualify patient for home vent. Plan:  Continue w/ BIPAP via trach at night. EPAP 8, PSV 4. Continue to titrate O2 to maintain SpO2 >85%  Aggressive pulmonary toilet   Incentive Spirometry   Suctioning via trach if patient is desatting and unable to expectorate phlegm. Continue w/ guaifenesin, Atrovent and Xopenex for airway maintenance. Continue inhalation treatment with Pulmicort and formoterol q12 , duonebs QID, atrovent and xopenex   Regular diet  Daily PT/OT    Large cell lymphoma (HCC)  Assessment & Plan  Large B-cell lymphoma, stage II. First chemo cycle 9/22 4 days of R-EPOCH. 9/27 Rituxan. 9/28 Filgrastim. Received nivestym 300 mc 7 days dcd on 10/26   Discontinue Prophylaxis as per Heme Oncology recommendations  Pt had hyperkalemia of 5.5, can be due to Bactrim, consider switching to atovaquone      9/22  4 days of R-EPOCH.  9/27 Rituxan  10/13 for [facility] cycle 2, which was done over 4 days and cytoxan on 10/19.  11/6 completed R-EPOCH cycle 3 and Cytoxan 11/7. Plan:  Pt received her 4th cycle of chemotherapy. Continue Bactrim, Acyclovir, Allopurinol for prophylaxis   Transfuse blood products as needed. Keep Hgb>7 and Plt>20 for chemo   See acute respiratory failure above    Acute embolism and thrombosis of right internal jugular vein (HCC)  Assessment & Plan  CT soft tissues of the neck: Nonocclusive thrombus noted within the right internal jugular vein just above the Mediport entrance site into the internal jugular vein. No signs of pain, headaches, vision changes. Plan:  Continue w/ home Eliquis 5 mg     Oropharyngeal mass  Assessment & Plan  9/14 Neck/ soft tissue CT: Large enhancing soft tissue mass identified within the left neck involving multiple spaces. Centered within the left oropharynx with extensions as described above into multiple spaces. This results in significant airway compromise. 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 Small level 3/level 4 nodes are seen within the neck. Tracheostomy by ENT. Replaced on 9/26. H/o tobacco abuse, daily smoker. On nicotine while inpatient, confirmed with patient she needs nicotine patch. CT soft tissue neck shows reduced mass effect from 9/14 to 10/13. Can consider reducing trach size if continues to improve. Patient often refusing peg tube feeds but is feeding herself small meals orally. Plan:  ENT and heme onc following  Pt received her 4th cycle of chemotherapy   As per speech therapist diet  Dysphagia 2. Pt no longer on PEG tube feeds for nutrition.    See acute respiratory failure and large B cell lymphoma above. Pancytopenia due to chemotherapy [facility])  Assessment & Plan  Recent Labs     11/28/23  0524 11/29/23  0516 11/30/23  0527   HGB 9.7* 10.2* 10.6*       '  Patient received 1 PRBC transfusion on 10/5 and 10/25. Her B/L hemoglobin is 12-14. No sites of bleeding, no black stools. Iron panel indicative of inflammatory anemia. Normal Vitamin B12 levels. Folate low at 5.5. Filgrastim 4 doses administered by heme/onc. Hb declined from 9.9 to 7.4, pt received 1 PRBC transfusion (11/24)    Plan:  Trend hemoglobin and transfuse for <7. As per heme/onc transfuse irradiated and leukoreduced blood products. Trend platelets  Folic acid 1mg daily supplementation  As per conversation with Heme/oncology attending, patient pancytopenia associated with chemotherapy is expected/predictable. No further anemia w/u required. Low ANC management as per heme/onc    Blister (nonthermal) of left forearm, sequela  Assessment & Plan  Blisters of left upper extremity healing, no signs of infection, continue daily wound care. (HFpEF) heart failure with preserved ejection fraction [facility])  Assessment & Plan  Wt Readings from Last 3 Encounters:   11/29/23 78.3 kg (172 lb 9.9 oz)   09/07/23 74.6 kg (164 lb 6.4 oz)   08/30/23 73.3 kg (161 lb 9.6 oz)     Lab Results   Component Value Date    LVEF 65 11/21/2023     (H) 10/28/2023     (H) 09/29/2023     Echo 11/21/23: LVEF 65%. Plan:  K > 4   Measure I/O    Ambulatory dysfunction  Assessment & Plan  2/2 Impacted by chronic pain syndrome + prolonged hospital stay. Continue OOB with PT. PT rec post acute rehab however reportedly Cannot get LTACH placement while getting chemo per CM  She is aware STR SNFs continue to follow and treatment can be done from a SNF level of care. PT would need to be on trach collar for at least 72 hours with no need for the vent.  Aware that pt would need to be around 28% FiO2     Depression  Assessment & Plan  Patient on Zoloft 76 daily and Seroquel hs  Patient is depressed, multiple family/palliative discussions. patient is firm that she wants to live and to fight, she is just tired. Currently goal is disease treatment oriented. Full code. No SI/HI ideations. Palliative signed off, will reconsult if patient changes her mind regarding wishes. Chronic Superficial thrombophlebitis  Assessment & Plan  Right forearm soreness. RUE US: No acute or chronic deep vein thrombosis. 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 PO not severe enough to require renal dosing at this time, will continue to monitor and adjust dosing as needed. Continue DVT ppx with Eliquis     CKD (chronic kidney disease) stage 3, GFR 30-59 ml/min [facility])  Assessment & Plan  Lab Results   Component Value Date    EGFR 76 11/29/2023    EGFR 58 11/28/2023    EGFR 48 11/27/2023    CREATININE 0.79 11/29/2023    CREATININE 0.98 11/28/2023    CREATININE 1.15 11/27/2023     Baseline Cr between 0.75-1.1  Initial PO felt 2/2 prerenal azotemia 2/2 diuresis, reduced PO intake +/- ATN. Patient received 2 doses of Bumex IV 2 g as she had not been responding appropriately to IV 40 Lasix daily. Creatinine went up by 0.5 meeting criteria for an PO. Suspect most likely due to medications as a combination of prerenal and intrinsic. FENa was 0.2%, UA shows no casts or signs of infection, urine eosinophils 0%. Patient likely has prerenal PO from volume depletion. Received 2 L NS and 1 pRBC and cr went up by 0.07 still and Na and Cl went down, suspect that volume prevented further cr rise and free water excretion impaired by kidney function, allowing hyponatremia to increase. Suspect that now that nephrotoxic medications have been stopped she responded well to Lasix and creatinine downtrended. PO now resolved. Will be cautious about nephrotoxins and monitor as restarting EPOCH and ppx. Patient gets volume depleted and overloaded very easily.  Especially from chemo.     Plan:   Continue to monitor UO/renal function. Avoid nephrotoxic agents  Follow up with BMP in the morning   Continue to monitor a.m. BMP. Pain syndrome, chronic  Assessment & Plan  Home regimen: Oxycodone 5 mg BID, Tylenol 650 mg q8 prn. Gabapentin 600 mg TID. Medical marijuana. Lumbar radiculopathy and impaired ambulatory dysfunction secondary to pain. Has bowel regimen and is having bowel movements daily. Patient required additional pain management during previous cycle and has some additional pain from tunneled line placement    Plan:  Continue gabapentin 300 mg TID, oxycodone 5 mg TID, prn Tylenol 650 mg q6. Oxycodone 5mg every 8 hours  Oxycodone 2.5 mg q6 PRN    Benign essential hypertension  Assessment & Plan  Home regimen Coreg 12.5 mg BID, Amlodipine 10 mg daily, and lisinopril 40 mg. All discontinued at this time secondary to hypotension and PO since cycle 1. but stable currently without any antihypertensives. Systolic persistently elevated and tachycardic, potentially secondary to pain. Patient was more hypotensive during last chemo. Coreg contraindicated during chemo, since cycles are every other week, would be better to stick with lopressor during duration of therapy as opposed to switching constantly. Plan:  Monitor vitals. Continue Norvasc 10 and lopressor 25 bid    Protein-calorie malnutrition ([address])  Assessment & Plan  Malnutrition Findings:   Adult Malnutrition type: Chronic illness  Adult Degree of Malnutrition: Unspecified protein calorie malnutrition  Malnutrition Characteristics: Inadequate energy, Other (comment) (stress of illness)                  360 Statement: Protein calorie malnutrition r/t inadequate intake as evidenced by >7 day period of poor intakes and stress of infection; currently treated with regular diet and nutrition supplements    BMI Findings: Body mass index is 33.71 kg/m².                 Disposition: Stepdown Level 1    ICU Core Measures     Vented Patient  VAP Bundle  VAP bundle ordered     A: Assess, Prevent, and Manage Pain Has pain been assessed? Yes  Need for changes to pain regimen? No   B: Both Spontaneous Awakening Trials (SATs) and Spontaneous Breathing Trials (SBTs) Plan to perform spontaneous awakening trial today? N/A   Plan to perform spontaneous breathing trial today? N/A   Obvious barriers to extubation? NA   C: Choice of Sedation RASS Goal: 0 Alert and Calm  Need for changes to sedation or analgesia regimen? No   D: Delirium CAM-ICU: Negative   E: Early Mobility  Plan for early mobility? NA   F: Family Engagement Plan for family engagement today? NA       Pt on prophylaxis medications due to her chemotherapy    Review of Invasive Devices:      Central access plan: Medications requiring central line      Prophylaxis:  VTE VTE covered by:  apixaban, Oral, 5 mg at 11/29/23 2117       Stress Ulcer  covered byfamotidine (PEPCID) tablet 20 mg [[id number]]         Significant 24hr Events     24hr events: Pt tolerated pressure support ventilation on FiO2 at 30% well. No overnight issues. Subjective   Review of Systems   Constitutional:  Negative for chills, fatigue and fever. HENT:  Positive for voice change. Respiratory:  Negative for cough, chest tightness and shortness of breath. Cardiovascular:  Negative for chest pain, palpitations and leg swelling. Gastrointestinal:  Negative for abdominal pain, diarrhea, nausea and vomiting. Endocrine: Negative. Genitourinary: Negative. Neurological: Negative. Negative for dizziness, light-headedness and headaches. Hematological: Negative. Psychiatric/Behavioral: Negative. All other systems reviewed and are negative.      Objective                            Vitals I/O      Most Recent Min/Max in 24hrs   Temp 99 °F (37.2 °C) Temp  Min: 98 °F (36.7 °C)  Max: 99.1 °F (37.3 °C)   Pulse 92 Pulse  Min: 67  Max: 105   Resp 17 Resp  Min: 11  Max: 29   /73 BP  Min: 102/60  Max: 138/86   O2 Sat 100 % SpO2  Min: 73 %  Max: 100 %      Intake/Output Summary (Last 24 hours) at 11/30/2023 0742  Last data filed at 11/30/2023 0600  Gross per 24 hour   Intake 630 ml   Output 1000 ml   Net -370 ml       Diet Regular; Regular House; Potassium 2 GM    Invasive Monitoring           Physical Exam   Physical Exam  Eyes:      General: Vision grossly intact. Extraocular Movements: Extraocular movements intact. Conjunctiva/sclera: Conjunctivae normal.      Pupils: Pupils are equal, round, and reactive to light. Skin:     General: Skin is warm. HENT:      Head: Normocephalic and atraumatic. Right Ear: Hearing normal.      Left Ear: Hearing normal.      Mouth/Throat:      Mouth: Mucous membranes are moist.   Neck:      Trachea: Tracheostomy present. Comments: tunneled DL RIJ catheter w/o purulence  Cardiovascular:      Rate and Rhythm: Normal rate and regular rhythm. Pulses: Normal pulses. Heart sounds: Normal heart sounds. Abdominal: General: Bowel sounds are normal.   Constitutional:       Appearance: She is ill-appearing. Pulmonary:      Effort: No respiratory distress. Breath sounds: Rhonchi present. Chest:      Chest wall: No tenderness. Neurological:      Mental Status: She is alert and oriented to person, place and time. Diagnostic Studies      Imaging: No imaging done in the past 24 hrs I have personally reviewed pertinent reports.        Medications:  Scheduled PRN   acyclovir, 400 mg, BID  allopurinol, 200 mg, 4x Daily  amLODIPine, 10 mg, Daily  apixaban, 5 mg, BID  budesonide, 0.5 mg, Q12H  busPIRone, 30 mg, TID  chlorhexidine, 15 mL, Q12H ASHLEY  famotidine, 20 mg, BID  Filgrastim-aafi, 300 mcg, Daily  fluconazole, 781 mg, Daily  folic acid, 1 mg, Daily  formoterol, 20 mcg, Q12H  gabapentin, 300 mg, TID  levalbuterol, 1.25 mg, Q6H   And  ipratropium, 0.5 mg, Q6H  levofloxacin, 250 mg, Q24H ASHLEY  melatonin, 3 mg, HS  metoprolol tartrate, 25 mg, Q12H [address]  nicotine, 7 mg, Daily  oxyCODONE, 5 mg, Q8H  polyethylene glycol, 17 g, Daily  potassium chloride, 20 mEq, Once  QUEtiapine, 50 mg, HS  senna, 17.6 mg, BID  sertraline, 75 mg, Daily  sulfamethoxazole-trimethoprim, 1 tablet, Once per day on Mon Wed Fri      alteplase, 2 mg, Q1MIN PRN  alteplase, 2 mg, Q1MIN PRN  alteplase, 2 mg, Q1MIN PRN  alteplase, 2 mg, Q1MIN PRN  alteplase, 2 mg, Q1MIN PRN  ondansetron, 4 mg, Q8H PRN  ondansetron, 4 mg, Q8H PRN  oxyCODONE, 2.5 mg, Q6H PRN       Continuous          Labs:    CBC    Recent Labs     11/29/23 0516 11/30/23 0527   WBC 4.46 10.30*   HGB 10.2* 10.6*   HCT 31.0* 32.8*    402*     BMP    Recent Labs     11/29/23  0516   SODIUM 140   K 3.2*   CL 98   CO2 33*   AGAP 9   BUN 23   CREATININE 0.79   CALCIUM 9.5       Coags    No recent results     Additional Electrolytes  Recent Labs     11/29/23 0516 11/29/23  1315   MG 1.3* 2.9*   PHOS 2.4  --    CAIONIZED 1.21  --           Blood Gas    No recent results  No recent results LFTs  Recent Labs     11/29/23 0516   ALT 29   AST 26   ALKPHOS 56   ALB 3.4*   TBILI 0.46       Infectious  No recent results  Glucose  Recent Labs     11/29/23 0516   GLUC 141*             Muriel Colindres MD

## 2023-11-30 NOTE — PLAN OF CARE
Problem: PHYSICAL THERAPY ADULT  Goal: Performs mobility at highest level of function for planned discharge setting. See evaluation for individualized goals. Description: Treatment/Interventions: Functional transfer training, LE strengthening/ROM, Elevations, Therapeutic exercise, Endurance training, Patient/family training, Equipment eval/education, Bed mobility, Gait training, Spoke to nursing, Spoke to case management          See flowsheet documentation for full assessment, interventions and recommendations. Outcome: Progressing  Note: Prognosis: Fair  Problem List: Decreased strength, Decreased endurance, Impaired balance, Decreased mobility, Impaired vision, Impaired judgement, Obesity, Decreased skin integrity, Decreased safety awareness  Assessment: pt required assist w/ all phases of mobility, including input for mobility/breathing technique and chair follow w/ ambulation. repetition was needed for carryover of education. pt remains at risk for falling due to physical and safety awareness limitations. Barriers to Discharge: Inaccessible home environment, Decreased caregiver support (3 PAUL)     Rehab Resource Intensity Level, PT: II (Moderate Resource Intensity)    See flowsheet documentation for full assessment.

## 2023-11-30 NOTE — ASSESSMENT & PLAN NOTE
Large B-cell lymphoma, stage II. First chemo cycle 9/22 4 days of R-EPOCH. 9/27 Rituxan. 9/28 Filgrastim. Received nivestym 300 mc 7 days dcd on 10/26   Discontinue Prophylaxis as per Heme Oncology recommendations  Pt had hyperkalemia of 5.5, can be due to Bactrim, consider switching to atovaquone      9/22  4 days of R-EPOCH.  9/27 Rituxan  10/13 for [facility] cycle 2, which was done over 4 days and cytoxan on 10/19.  11/6 completed R-EPOCH cycle 3 and Cytoxan 11/7. Plan:  Pt received her 4th cycle of chemotherapy. Continue Bactrim, Acyclovir, Allopurinol for prophylaxis   Transfuse blood products as needed.   Keep Hgb>7 and Plt>20 for chemo   See acute respiratory failure above

## 2023-11-30 NOTE — ASSESSMENT & PLAN NOTE
Recent Labs     11/28/23  0524 11/29/23  0516 11/30/23  0527   HGB 9.7* 10.2* 10.6*       '  Patient received 1 PRBC transfusion on 10/5 and 10/25. Her B/L hemoglobin is 12-14. No sites of bleeding, no black stools. Iron panel indicative of inflammatory anemia. Normal Vitamin B12 levels. Folate low at 5.5. Filgrastim 4 doses administered by heme/onc. Hb declined from 9.9 to 7.4, pt received 1 PRBC transfusion (11/24)    Plan:  Trend hemoglobin and transfuse for <7. As per heme/onc transfuse irradiated and leukoreduced blood products. Trend platelets  Folic acid 1mg daily supplementation  As per conversation with Heme/oncology attending, patient pancytopenia associated with chemotherapy is expected/predictable. No further anemia w/u required.   Low ANC management as per heme/onc

## 2023-11-30 NOTE — ASSESSMENT & PLAN NOTE
Large B-cell lymphoma, stage II. First chemo cycle 9/22 4 days of R-EPOCH. 9/27 Rituxan. 9/28 Filgrastim. Received nivestym 300 mc 7 days dcd on 10/26   Discontinue Prophylaxis as per Heme Oncology recommendations  Pt had hyperkalemia of 5.5, can be due to Bactrim, consider switching to atovaquone      9/22  4 days of R-EPOCH.  9/27 Rituxan  10/13 for [facility] cycle 2, which was done over 4 days and cytoxan on 10/19.  11/6 completed R-EPOCH cycle 3 and Cytoxan 11/7. Plan:  Pt received her 4th cycle of chemotherapy. Continue Bactrim, Acyclovir, Allopurinol for prophylaxis   Transfuse blood products as needed.   Keep Hgb>7 and Plt>20 for chemo   See acute respiratory failure above

## 2023-11-30 NOTE — PLAN OF CARE
Problem: MOBILITY - ADULT  Goal: Maintain or return to baseline ADL function  Description: INTERVENTIONS:  -  Assess patient's ability to carry out ADLs; assess patient's baseline for ADL function and identify physical deficits which impact ability to perform ADLs (bathing, care of mouth/teeth, toileting, grooming, dressing, etc.)  - Assess/evaluate cause of self-care deficits   - Assess range of motion  - Assess patient's mobility; develop plan if impaired  - Assess patient's need for assistive devices and provide as appropriate  - Encourage maximum independence but intervene and supervise when necessary  - Involve family in performance of ADLs  - Assess for home care needs following discharge   - Consider OT consult to assist with ADL evaluation and planning for discharge  - Provide patient education as appropriate  Outcome: Progressing  Goal: Maintains/Returns to pre admission functional level  Description: INTERVENTIONS:  - Perform BMAT or MOVE assessment daily.   - Set and communicate daily mobility goal to care team and patient/family/caregiver.    - Collaborate with rehabilitation services on mobility goals if consulted  - Out of bed for toileting  - Record patient progress and toleration of activity level   Outcome: Progressing     Problem: Prexisting or High Potential for Compromised Skin Integrity  Goal: Skin integrity is maintained or improved  Description: INTERVENTIONS:  - Identify patients at risk for skin breakdown  - Assess and monitor skin integrity  - Assess and monitor nutrition and hydration status  - Monitor labs   - Assess for incontinence   - Turn and reposition patient  - Assist with mobility/ambulation  - Relieve pressure over bony prominences  - Avoid friction and shearing  - Provide appropriate hygiene as needed including keeping skin clean and dry  - Evaluate need for skin moisturizer/barrier cream  - Collaborate with interdisciplinary team   - Patient/family teaching  - Consider wound care consult   Outcome: Progressing     Problem: Nutrition/Hydration-ADULT  Goal: Nutrient/Hydration intake appropriate for improving, restoring or maintaining nutritional needs  Description: Monitor and assess patient's nutrition/hydration status for malnutrition. Collaborate with interdisciplinary team and initiate plan and interventions as ordered. Monitor patient's weight and dietary intake as ordered or per policy. Utilize nutrition screening tool and intervene as necessary. Determine patient's food preferences and provide high-protein, high-caloric foods as appropriate.      INTERVENTIONS:  - Monitor oral intake, urinary output, labs, and treatment plans  - Assess nutrition and hydration status and recommend course of action  - Evaluate amount of meals eaten  - Assist patient with eating if necessary   - Allow adequate time for meals  - Recommend/ encourage appropriate diets, oral nutritional supplements, and vitamin/mineral supplements  - Order, calculate, and assess calorie counts as needed  - Recommend, monitor, and adjust tube feedings and TPN/PPN based on assessed needs  - Assess need for intravenous fluids  - Provide specific nutrition/hydration education as appropriate  - Include patient/family/caregiver in decisions related to nutrition  Outcome: Progressing     Problem: PAIN - ADULT  Goal: Verbalizes/displays adequate comfort level or baseline comfort level  Description: Interventions:  - Encourage patient to monitor pain and request assistance  - Assess pain using appropriate pain scale  - Administer analgesics based on type and severity of pain and evaluate response  - Implement non-pharmacological measures as appropriate and evaluate response  - Consider cultural and social influences on pain and pain management  - Notify physician/advanced practitioner if interventions unsuccessful or patient reports new pain  Outcome: Progressing     Problem: INFECTION - ADULT  Goal: Absence or prevention of progression during hospitalization  Description: INTERVENTIONS:  - Assess and monitor for signs and symptoms of infection  - Monitor lab/diagnostic results  - Monitor all insertion sites, i.e. indwelling lines, tubes, and drains  - Monitor endotracheal if appropriate and nasal secretions for changes in amount and color  - Ray appropriate cooling/warming therapies per order  - Administer medications as ordered  - Instruct and encourage patient and family to use good hand hygiene technique  - Identify and instruct in appropriate isolation precautions for identified infection/condition  Outcome: Progressing  Goal: Absence of fever/infection during neutropenic period  Description: INTERVENTIONS:  - Monitor WBC    Outcome: Progressing     Problem: SAFETY ADULT  Goal: Maintain or return to baseline ADL function  Description: INTERVENTIONS:  -  Assess patient's ability to carry out ADLs; assess patient's baseline for ADL function and identify physical deficits which impact ability to perform ADLs (bathing, care of mouth/teeth, toileting, grooming, dressing, etc.)  - Assess/evaluate cause of self-care deficits   - Assess range of motion  - Assess patient's mobility; develop plan if impaired  - Assess patient's need for assistive devices and provide as appropriate  - Encourage maximum independence but intervene and supervise when necessary  - Involve family in performance of ADLs  - Assess for home care needs following discharge   - Consider OT consult to assist with ADL evaluation and planning for discharge  - Provide patient education as appropriate  Outcome: Progressing  Goal: Maintains/Returns to pre admission functional level  Description: INTERVENTIONS:  - Perform BMAT or MOVE assessment daily.   - Set and communicate daily mobility goal to care team and patient/family/caregiver.    - Collaborate with rehabilitation services on mobility goals if consulted  - Out of bed for toileting  - Record patient progress and toleration of activity level   Outcome: Progressing  Goal: Patient will remain free of falls  Description: INTERVENTIONS:  -  Assess patient's ability to carry out ADLs; assess patient's baseline for ADL function and identify physical deficits which impact ability to perform ADLs (bathing, care of mouth/teeth, toileting, grooming, dressing, etc.)  - Assess/evaluate cause of self-care deficits   - Assess range of motion  - Assess patient's mobility; develop plan if impaired  - Assess patient's need for assistive devices and provide as appropriate  - Encourage maximum independence but intervene and supervise when necessary  - Involve family in performance of ADLs  - Assess for home care needs following discharge   - Consider OT consult to assist with ADL evaluation and planning for discharge  - Provide patient education as appropriate  Outcome: Progressing     Problem: DISCHARGE PLANNING  Goal: Discharge to home or other facility with appropriate resources  Description: INTERVENTIONS:  - Identify barriers to discharge w/patient and caregiver  - Arrange for needed discharge resources and transportation as appropriate  - Identify discharge learning needs (meds, wound care, etc.)  - Arrange for interpretive services to assist at discharge as needed  - Refer to Case Management Department for coordinating discharge planning if the patient needs post-hospital services based on physician/advanced practitioner order or complex needs related to functional status, cognitive ability, or social support system  Outcome: Progressing     Problem: Knowledge Deficit  Goal: Patient/family/caregiver demonstrates understanding of disease process, treatment plan, medications, and discharge instructions  Description: Complete learning assessment and assess knowledge base.   Interventions:  - Provide teaching at level of understanding  - Provide teaching via preferred learning methods  Outcome: Progressing     Problem: RESPIRATORY - ADULT  Goal: Achieves optimal ventilation and oxygenation  Description: INTERVENTIONS:  - Assess for changes in respiratory status  - Assess for changes in mentation and behavior  - Position to facilitate oxygenation and minimize respiratory effort  - Oxygen administered by appropriate delivery if ordered  - Initiate smoking cessation education as indicated  - Encourage broncho-pulmonary hygiene including cough, deep breathe, Incentive Spirometry  - Assess the need for suctioning and aspirate as needed  - Assess and instruct to report SOB or any respiratory difficulty  - Respiratory Therapy support as indicated  Outcome: Progressing     Problem: GENITOURINARY - ADULT  Goal: Maintains or returns to baseline urinary function  Description: INTERVENTIONS:  - Assess urinary function  - Encourage oral fluids to ensure adequate hydration if ordered  - Administer IV fluids as ordered to ensure adequate hydration  - Administer ordered medications as needed  - Offer frequent toileting  - Follow urinary retention protocol if ordered  Outcome: Progressing  Goal: Absence of urinary retention  Description: INTERVENTIONS:  - Assess patient’s ability to void and empty bladder  - Monitor I/O  - Bladder scan as needed  - Discuss with physician/AP medications to alleviate retention as needed  - Discuss catheterization for long term situations as appropriate  Outcome: Progressing     Problem: METABOLIC, FLUID AND ELECTROLYTES - ADULT  Goal: Electrolytes maintained within normal limits  Description: INTERVENTIONS:  - Monitor labs and assess patient for signs and symptoms of electrolyte imbalances  - Administer electrolyte replacement as ordered  - Monitor response to electrolyte replacements, including repeat lab results as appropriate  - Instruct patient on fluid and nutrition as appropriate  Outcome: Progressing  Goal: Fluid balance maintained  Description: INTERVENTIONS:  - Monitor labs   - Monitor I/O and WT  - Instruct patient on fluid and nutrition as appropriate  - Assess for signs & symptoms of volume excess or deficit  Outcome: Progressing  Goal: Glucose maintained within target range  Description: INTERVENTIONS:  - Monitor Blood Glucose as ordered  - Assess for signs and symptoms of hyperglycemia and hypoglycemia  - Administer ordered medications to maintain glucose within target range  - Assess nutritional intake and initiate nutrition service referral as needed  Outcome: Progressing

## 2023-11-30 NOTE — PHYSICAL THERAPY NOTE
PHYSICAL THERAPY TREATMENT NOTE    Patient Name: [facility]  EOITP'V Date: 11/30/2023 11/30/23 1542   PT Last Visit   PT Visit Date 11/30/23   Pain Assessment   Pain Assessment Tool 0-10   Pain Score No Pain   Restrictions/Precautions   Other Precautions Chair Alarm; Bed Alarm;Multiple lines;Telemetry;O2;Fall Risk;Aspiration  (trach collar)   General   Chart Reviewed Yes   Additional Pertinent History resting pulse ox 99% and 81 BPM, active 95% and 88 BPM.   Family/Caregiver Present   (family entered during the session)   Cognition   Arousal/Participation Lethargic   Attention Attends with cues to redirect   Orientation Level Oriented to person; Other (Comment)  (pt was identified in computer and using ID bracelet)   Following Commands Follows one step commands with increased time or repetition   Subjective   Subjective pt seen supine in bed. therapist had previously set up time to work w/ pt. pt needed motivation to gain participation in PT session. pt denied pain or dizziness. input was needed for task focus. Bed Mobility   Supine to Sit 5  Supervision   Additional items HOB elevated; Bedrails; Increased time required   Transfers   Sit to Stand 4  Minimal assistance   Additional items Assist x 1; Increased time required;Verbal cues  (for hand placement)   Stand to Sit 4  Minimal assistance  (poorly controlled descent to sitting surface)   Additional items Assist x 1;Verbal cues  (for body positioning, hand placement, safety)   Ambulation/Elevation   Gait pattern Decreased foot clearance; Forward Flexion; Excessively slow   Gait Assistance 4  Minimal assist  (w/ chair follow)   Additional items Assist x 1;Verbal cues; Tactile cues  (for walker positioning, posture, full step length)   Assistive Device Rolling walker   Distance 7 feet. seated rest break.  10 feet.  (additional ambulation was not possible due to fatigue, dyspnea)   Stair Management Assistance Not tested  (pt declined stair use due to fatigue.)   Balance   Static Sitting Fair +   Static Standing Poor +  (w/ roller walker)   Ambulatory Poor +  (w/ roller walker)   Activity Tolerance   Activity Tolerance Patient limited by fatigue   Nurse Made Aware spoke to [facility] Megha HOLLAND CM   Assessment   Problem List Decreased strength;Decreased endurance; Impaired balance;Decreased mobility; Impaired vision; Impaired judgement;Obesity; Decreased skin integrity; Decreased safety awareness   Assessment pt required assist w/ all phases of mobility, including input for mobility/breathing technique and chair follow w/ ambulation. repetition was needed for carryover of education. pt remains at risk for falling due to physical and safety awareness limitations. Goals   Patient Goals pt did not state goals when asked. will continue to assess. STG Expiration Date 12/11/23   Short Term Goal #1 pt will be able to: 1. Demonstrate ability to perform all aspects of bed mobility independently to improve functional safety. PROGRESSING 2. Perform functional transfers independently to facilitate safe return to previous living environment. PROGRESSING 3. Ambulate 50 ft with LRAD and supervision with stable vitals to improve safety with household distances and reduce fall risk. PROGRESSING 4. Improve LE strength grades by 1 to increase ease of functional mobility with transfers and gait. MET  5. Pt will demonstrate improved balance by one grade in order to decrease risk of falls. MET  6. Tolerate 3 hours OOB to promote improved activity tolerance. MET 7. Pt will perform 5x STS in 12.6 seconds to decrease risk of falls. PT Treatment Day 11   Plan   Treatment/Interventions Functional transfer training;LE strengthening/ROM; Therapeutic exercise;Cognitive reorientation;Patient/family training;Equipment eval/education; Bed mobility;Gait training; Endurance training   Progress Slow progress, decreased activity tolerance PT Frequency 2-3x/wk   Discharge Recommendation   Rehab Resource Intensity Level, PT II (Moderate Resource Intensity)   AM-PAC Basic Mobility Inpatient   Turning in Flat Bed Without Bedrails 3   Lying on Back to Sitting on Edge of Flat Bed Without Bedrails 2   Moving Bed to Chair 3   Standing Up From Chair Using Arms 3   Walk in Room 3   Climb 3-5 Stairs With Railing 1   Basic Mobility Inpatient Raw Score 15   Basic Mobility Standardized Score 36.97   Highest Level Of Mobility   JH-HLM Goal 4: Move to chair/commode   JH-HLM Achieved 6: Walk 10 steps or more   End of Consult   Patient Position at End of Consult Bedside chair;Bed/Chair alarm activated; All needs within reach; Other (comment)  (pt stated feeling short of breath at end of session. Chiquita NSG was called to bedside. pt requested breathing technique. NSG contacted Respiratory Therapy.)     The patient's AM-PAC Basic Mobility Inpatient Short Form Raw Score is 15. A Raw score of less than or equal to 16 suggests the patient may benefit from discharge to post-acute rehabilitation services. Please also refer to the recommendation of the Physical Therapist for safe discharge planning. Skilled inpatient PT recommended while in hospital to progress pt toward treatment goals.     Daun Dance, PT

## 2023-11-30 NOTE — ASSESSMENT & PLAN NOTE
9/14 Neck/ soft tissue CT: Large enhancing soft tissue mass identified within the left neck involving multiple spaces. Centered within the left oropharynx with extensions as described above into multiple spaces. This results in significant airway compromise. 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 Small level 3/level 4 nodes are seen within the neck. Tracheostomy by ENT. Replaced on 9/26. H/o tobacco abuse, daily smoker. On nicotine while inpatient, confirmed with patient she needs nicotine patch. CT soft tissue neck shows reduced mass effect from 9/14 to 10/13. Can consider reducing trach size if continues to improve. Patient often refusing peg tube feeds but is feeding herself small meals orally. Plan:  ENT and heme onc following  Pt received her 4th cycle of chemotherapy   As per speech therapist diet  Dysphagia 2. Pt no longer on PEG tube feeds for nutrition. See acute respiratory failure and large B cell lymphoma above.

## 2023-11-30 NOTE — RESPIRATORY THERAPY NOTE
Was called to check on patient because she was stating that she could not breathe. Her Sp02 is 97% on 40% trach collar. I asked her if I could suction her she refused and told me she does not want anything and wants to go home. She did not want me to help her. Her respiratory rate is 20. She is very upset due to her not being able to go home today.

## 2023-11-30 NOTE — CASE MANAGEMENT
Case Management Progress Note    Patient name Laurie [place]  Location ICU 03/ICU 03 MRN [id number]  : 1955 Date 2023       LOS (days): 66  Geometric Mean LOS (GMLOS) (days): 26.10  Days to GMLOS:-51.7        OBJECTIVE:    Current admission status: Inpatient  Preferred Pharmacy:   [id number] [place] - [address]. [address] [place] [address]  Phone: [phone] Fax: [phone] [facility] [place], [place] - [address]  [address] [address]  Phone: [phone] Fax: [phone]    Primary Care Provider: Chris Hernandez MD    Primary Insurance: [employer]  Secondary Insurance: [place] NOTE:    CM spoke with pt  - Сергей Woody with [facility] updated on the goal for dc on Monday. Joanie Boo is aware that [facility] will have oversight with the [facility] and will continue to provide teaching/oncall to the family.  [facility] is set up for RN PT/OT/SP. Will also request a medical social worker. Joanie Boo did confirm if pt requires BLS transport for OP Appointments that this would go through [address] [place] [address] Needs Unit. Aware CM can call and place referral for f/u with the family. Will discuss further with pt/daughter, Yury Carroll requesting clinicals (H&P, Diagnosis, and Progress notes). With clinical information Joanie Boo will request authorization from [address] [place] [address] for [address] [place] [address] for when family is unavailable. Joanie Boo reports there is no guarantee it will get approved. Joanie Boo will also request a temporary increase of HHA Hours for pt upon dc and Joanie Boo will plan to do a home assessment in order to request a permanent increase in services. Carly aware that the family does plan to provide 24/7 support for pt upon dc.  Joanie Boo will also reach out to daughter to confirm support at home and discuss request for on going skilled nursing and increase in HHA support. Fax number: [phone].  [facility] will be able to provide an RN visit on Monday. They will schedule around the RT with [address] so they are not at the home at the same time. CM spoke with Sanya Diaz at [facility]. Revised Trach form completed and will need signature from the physician (Dr. Wilver Scanlon aware). CM requested updated time for Monday to be at pt's home. Spoke with Nursing - will continue Trach care teaching at bedside with family when they are here. Will also show care for pt PEG tube (pt currently getting medication via PEG tube and will also likely need flushes). Trach form completed and signed by Dr. Wilver Scanlon. CM sent back to [address]. CM met with pt who was accompanied by her DIL, Leodan Chawla. Pt did confirm CM able to fax clinicals to her , Elly Warner, to be able to request home nursing through the waiver. Pt aware that it is not a guarantee that Elly Warner will obtain approval. CM did review that Elly Warner will request a temporary increase in HHA support. Elly Warner will need to complete a home assessment once pt is dc to obtain permanent increase in services. Pt aware Elly Warner will also discuss with her daughter, Alton Nielsen. CM did review role of A, [facility], HHA, and family. CM did review that goal is for a Monday dc. Aware CM is confirming SOC with A and [facility] RT for Monday visits. CM will also have to confirm all DME deliveries prior to dc. Pt also agreeable to referral to [address] [place] [address] CM department in order to assist with any additional needs such as medical transportation (BLS) for appointments. Re-reviewed mtg with medical team tomorrow. Leodan Chawla did confirm family will be visiting all weekend to continue with care need teaching. CM also spoke with CC team. Requested if scripts for home can be sent by tomorrow to address any issues with home medication/cost prior to pt dc on Monday.      As per [address] they are able to have an RT out to pt's home on Monday 12/4/23 at 1 p.m.     SHIRA sent message to  [facility]. Informed them of [facility] time for visit. Requested time they are available after they met pt at home. As per [facility] they will have an RN out to the home at 4 p.m. on Monday 12/4/23. As per Silvana Chung with [facility] they plan to delivery all necessary supplies for Home O2 and trach to pt's home tomorrow. They will coordinate with the family for delivery. Araceli with [facility] will bring home vent to the hospital tomorrow. SHIRA met with pt daughter, Patricia Conway, and updated her on plan for dc on Monday. Reviewed above information.

## 2023-12-01 LAB
ANION GAP SERPL CALCULATED.3IONS-SCNC: 7 MMOL/L
ANION GAP SERPL CALCULATED.3IONS-SCNC: 8 MMOL/L
ANION GAP SERPL CALCULATED.3IONS-SCNC: 9 MMOL/L
BASOPHILS # BLD MANUAL: 0 THOUSAND/UL (ref 0–0.1)
BASOPHILS NFR MAR MANUAL: 0 % (ref 0–1)
BUN SERPL-MCNC: 34 MG/DL (ref 5–25)
BUN SERPL-MCNC: 36 MG/DL (ref 5–25)
BUN SERPL-MCNC: 38 MG/DL (ref 5–25)
CA-I BLD-SCNC: 1.19 MMOL/L (ref 1.12–1.32)
CALCIUM SERPL-MCNC: 9.3 MG/DL (ref 8.4–10.2)
CALCIUM SERPL-MCNC: 9.5 MG/DL (ref 8.4–10.2)
CALCIUM SERPL-MCNC: 9.5 MG/DL (ref 8.4–10.2)
CHLORIDE SERPL-SCNC: 101 MMOL/L (ref 96–108)
CHLORIDE SERPL-SCNC: 99 MMOL/L (ref 96–108)
CHLORIDE SERPL-SCNC: 99 MMOL/L (ref 96–108)
CO2 SERPL-SCNC: 29 MMOL/L (ref 21–32)
CO2 SERPL-SCNC: 30 MMOL/L (ref 21–32)
CO2 SERPL-SCNC: 30 MMOL/L (ref 21–32)
CREAT SERPL-MCNC: 1.31 MG/DL (ref 0.6–1.3)
CREAT SERPL-MCNC: 1.33 MG/DL (ref 0.6–1.3)
CREAT SERPL-MCNC: 1.34 MG/DL (ref 0.6–1.3)
DME PARACHUTE DELIVERY DATE ACTUAL: NORMAL
DME PARACHUTE DELIVERY DATE EXPECTED: NORMAL
DME PARACHUTE DELIVERY DATE REQUESTED: NORMAL
DME PARACHUTE DELIVERY NOTE: NORMAL
DME PARACHUTE ITEM DESCRIPTION: NORMAL
DME PARACHUTE ORDER STATUS: NORMAL
DME PARACHUTE SUPPLIER NAME: NORMAL
DME PARACHUTE SUPPLIER PHONE: NORMAL
EOSINOPHIL # BLD MANUAL: 0 THOUSAND/UL (ref 0–0.4)
EOSINOPHIL NFR BLD MANUAL: 0 % (ref 0–6)
ERYTHROCYTE [DISTWIDTH] IN BLOOD BY AUTOMATED COUNT: 15.4 % (ref 11.6–15.1)
GFR SERPL CREATININE-BSD FRML MDRD: 40 ML/MIN/1.73SQ M
GFR SERPL CREATININE-BSD FRML MDRD: 40 ML/MIN/1.73SQ M
GFR SERPL CREATININE-BSD FRML MDRD: 41 ML/MIN/1.73SQ M
GLUCOSE SERPL-MCNC: 105 MG/DL (ref 65–140)
GLUCOSE SERPL-MCNC: 116 MG/DL (ref 65–140)
GLUCOSE SERPL-MCNC: 116 MG/DL (ref 65–140)
HCT VFR BLD AUTO: 25.9 % (ref 34.8–46.1)
HGB BLD-MCNC: 8.5 G/DL (ref 11.5–15.4)
HGB BLD-MCNC: 9.3 G/DL (ref 11.5–15.4)
LG PLATELETS BLD QL SMEAR: PRESENT
LYMPHOCYTES # BLD AUTO: 0.67 THOUSAND/UL (ref 0.6–4.47)
LYMPHOCYTES # BLD AUTO: 3 % (ref 14–44)
MAGNESIUM SERPL-MCNC: 1.9 MG/DL (ref 1.9–2.7)
MCH RBC QN AUTO: 29.9 PG (ref 26.8–34.3)
MCHC RBC AUTO-ENTMCNC: 32.8 G/DL (ref 31.4–37.4)
MCV RBC AUTO: 91 FL (ref 82–98)
MONOCYTES # BLD AUTO: 0 THOUSAND/UL (ref 0–1.22)
MONOCYTES NFR BLD: 0 % (ref 4–12)
NEUTROPHILS # BLD MANUAL: 21.69 THOUSAND/UL (ref 1.85–7.62)
NEUTS BAND NFR BLD MANUAL: 7 % (ref 0–8)
NEUTS SEG NFR BLD AUTO: 90 % (ref 43–75)
PHOSPHATE SERPL-MCNC: 3.4 MG/DL (ref 2.3–4.1)
PLATELET # BLD AUTO: 320 THOUSANDS/UL (ref 149–390)
PLATELET BLD QL SMEAR: ADEQUATE
PMV BLD AUTO: 9.8 FL (ref 8.9–12.7)
POTASSIUM SERPL-SCNC: 3.2 MMOL/L (ref 3.5–5.3)
POTASSIUM SERPL-SCNC: 3.3 MMOL/L (ref 3.5–5.3)
POTASSIUM SERPL-SCNC: 3.6 MMOL/L (ref 3.5–5.3)
RBC # BLD AUTO: 2.84 MILLION/UL (ref 3.81–5.12)
RBC MORPH BLD: NORMAL
SODIUM SERPL-SCNC: 136 MMOL/L (ref 135–147)
SODIUM SERPL-SCNC: 138 MMOL/L (ref 135–147)
SODIUM SERPL-SCNC: 138 MMOL/L (ref 135–147)
WBC # BLD AUTO: 22.36 THOUSAND/UL (ref 4.31–10.16)

## 2023-12-01 PROCEDURE — 94669 MECHANICAL CHEST WALL OSCILL: CPT

## 2023-12-01 PROCEDURE — 82330 ASSAY OF CALCIUM: CPT

## 2023-12-01 PROCEDURE — 80048 BASIC METABOLIC PNL TOTAL CA: CPT

## 2023-12-01 PROCEDURE — 85018 HEMOGLOBIN: CPT

## 2023-12-01 PROCEDURE — 94640 AIRWAY INHALATION TREATMENT: CPT

## 2023-12-01 PROCEDURE — 94150 VITAL CAPACITY TEST: CPT

## 2023-12-01 PROCEDURE — 84100 ASSAY OF PHOSPHORUS: CPT

## 2023-12-01 PROCEDURE — 83735 ASSAY OF MAGNESIUM: CPT

## 2023-12-01 PROCEDURE — 94760 N-INVAS EAR/PLS OXIMETRY 1: CPT

## 2023-12-01 PROCEDURE — 99233 SBSQ HOSP IP/OBS HIGH 50: CPT | Performed by: INTERNAL MEDICINE

## 2023-12-01 PROCEDURE — 85007 BL SMEAR W/DIFF WBC COUNT: CPT

## 2023-12-01 PROCEDURE — 99232 SBSQ HOSP IP/OBS MODERATE 35: CPT | Performed by: INTERNAL MEDICINE

## 2023-12-01 PROCEDURE — 94003 VENT MGMT INPAT SUBQ DAY: CPT

## 2023-12-01 PROCEDURE — 85027 COMPLETE CBC AUTOMATED: CPT

## 2023-12-01 RX ORDER — QUETIAPINE FUMARATE 50 MG/1
50 TABLET, FILM COATED ORAL
Qty: 30 TABLET | Refills: 0 | Status: SHIPPED | OUTPATIENT
Start: 2023-12-01 | End: 2023-12-05 | Stop reason: SDUPTHER

## 2023-12-01 RX ORDER — ALBUMIN (HUMAN) 12.5 G/50ML
12.5 SOLUTION INTRAVENOUS ONCE
Status: COMPLETED | OUTPATIENT
Start: 2023-12-01 | End: 2023-12-01

## 2023-12-01 RX ORDER — FOLIC ACID 1 MG/1
1 TABLET ORAL DAILY
Qty: 30 TABLET | Refills: 0 | Status: SHIPPED | OUTPATIENT
Start: 2023-12-02 | End: 2024-01-01

## 2023-12-01 RX ORDER — FORMOTEROL FUMARATE 20 UG/2ML
20 SOLUTION RESPIRATORY (INHALATION)
Qty: 30 ML | Refills: 0 | Status: SHIPPED | OUTPATIENT
Start: 2023-12-01

## 2023-12-01 RX ORDER — POTASSIUM CHLORIDE 20 MEQ/1
40 TABLET, EXTENDED RELEASE ORAL ONCE
Status: COMPLETED | OUTPATIENT
Start: 2023-12-01 | End: 2023-12-01

## 2023-12-01 RX ORDER — LANOLIN ALCOHOL/MO/W.PET/CERES
3 CREAM (GRAM) TOPICAL
Qty: 30 TABLET | Refills: 0 | Status: SHIPPED | OUTPATIENT
Start: 2023-12-01 | End: 2023-12-31

## 2023-12-01 RX ORDER — POTASSIUM CHLORIDE 20MEQ/15ML
40 LIQUID (ML) ORAL ONCE
Status: COMPLETED | OUTPATIENT
Start: 2023-12-01 | End: 2023-12-01

## 2023-12-01 RX ORDER — MAGNESIUM SULFATE 1 G/100ML
1 INJECTION INTRAVENOUS ONCE
Status: COMPLETED | OUTPATIENT
Start: 2023-12-01 | End: 2023-12-01

## 2023-12-01 RX ORDER — ALBUMIN, HUMAN INJ 5% 5 %
12.5 SOLUTION INTRAVENOUS ONCE
Status: COMPLETED | OUTPATIENT
Start: 2023-12-01 | End: 2023-12-01

## 2023-12-01 RX ORDER — GABAPENTIN 300 MG/1
300 CAPSULE ORAL 3 TIMES DAILY
Qty: 90 CAPSULE | Refills: 0 | Status: SHIPPED | OUTPATIENT
Start: 2023-12-01 | End: 2023-12-31

## 2023-12-01 RX ORDER — SERTRALINE HYDROCHLORIDE 25 MG/1
75 TABLET, FILM COATED ORAL DAILY
Qty: 90 TABLET | Refills: 0 | Status: SHIPPED | OUTPATIENT
Start: 2023-12-02 | End: 2023-12-04

## 2023-12-01 RX ORDER — LEVALBUTEROL INHALATION SOLUTION 1.25 MG/3ML
1.25 SOLUTION RESPIRATORY (INHALATION) EVERY 6 HOURS
Qty: 30 ML | Refills: 0 | Status: SHIPPED | OUTPATIENT
Start: 2023-12-01

## 2023-12-01 RX ADMIN — OXYCODONE HYDROCHLORIDE 5 MG: 5 SOLUTION ORAL at 21:33

## 2023-12-01 RX ADMIN — BUSPIRONE HYDROCHLORIDE 30 MG: 10 TABLET ORAL at 21:36

## 2023-12-01 RX ADMIN — BUSPIRONE HYDROCHLORIDE 30 MG: 10 TABLET ORAL at 16:15

## 2023-12-01 RX ADMIN — APIXABAN 5 MG: 5 TABLET, FILM COATED ORAL at 21:33

## 2023-12-01 RX ADMIN — FORMOTEROL FUMARATE DIHYDRATE 20 MCG: 20 SOLUTION RESPIRATORY (INHALATION) at 08:54

## 2023-12-01 RX ADMIN — ALBUMIN (HUMAN) 12.5 G: 12.5 INJECTION, SOLUTION INTRAVENOUS at 22:40

## 2023-12-01 RX ADMIN — LEVALBUTEROL HYDROCHLORIDE 1.25 MG: 1.25 SOLUTION RESPIRATORY (INHALATION) at 08:54

## 2023-12-01 RX ADMIN — IPRATROPIUM BROMIDE 0.5 MG: 0.5 SOLUTION RESPIRATORY (INHALATION) at 08:54

## 2023-12-01 RX ADMIN — CHLORHEXIDINE GLUCONATE 15 ML: 1.2 SOLUTION ORAL at 21:33

## 2023-12-01 RX ADMIN — ALLOPURINOL 200 MG: 100 TABLET ORAL at 09:59

## 2023-12-01 RX ADMIN — MELATONIN 3 MG: at 21:33

## 2023-12-01 RX ADMIN — GABAPENTIN 300 MG: 300 CAPSULE ORAL at 09:58

## 2023-12-01 RX ADMIN — OXYCODONE HYDROCHLORIDE 5 MG: 5 SOLUTION ORAL at 12:41

## 2023-12-01 RX ADMIN — MAGNESIUM SULFATE HEPTAHYDRATE 1 G: 1 INJECTION, SOLUTION INTRAVENOUS at 18:05

## 2023-12-01 RX ADMIN — ALLOPURINOL 200 MG: 100 TABLET ORAL at 21:33

## 2023-12-01 RX ADMIN — GABAPENTIN 300 MG: 300 CAPSULE ORAL at 16:15

## 2023-12-01 RX ADMIN — FOLIC ACID 1 MG: 1 TABLET ORAL at 09:59

## 2023-12-01 RX ADMIN — FAMOTIDINE 20 MG: 20 TABLET, FILM COATED ORAL at 17:44

## 2023-12-01 RX ADMIN — ALLOPURINOL 200 MG: 100 TABLET ORAL at 17:44

## 2023-12-01 RX ADMIN — SULFAMETHOXAZOLE AND TRIMETHOPRIM 1 TABLET: 800; 160 TABLET ORAL at 09:59

## 2023-12-01 RX ADMIN — BUDESONIDE 0.5 MG: 0.5 INHALANT ORAL at 08:54

## 2023-12-01 RX ADMIN — LEVALBUTEROL HYDROCHLORIDE 1.25 MG: 1.25 SOLUTION RESPIRATORY (INHALATION) at 19:28

## 2023-12-01 RX ADMIN — METOPROLOL TARTRATE 25 MG: 25 TABLET, FILM COATED ORAL at 09:59

## 2023-12-01 RX ADMIN — GABAPENTIN 300 MG: 300 CAPSULE ORAL at 21:33

## 2023-12-01 RX ADMIN — IPRATROPIUM BROMIDE 0.5 MG: 0.5 SOLUTION RESPIRATORY (INHALATION) at 02:29

## 2023-12-01 RX ADMIN — FAMOTIDINE 20 MG: 20 TABLET, FILM COATED ORAL at 09:59

## 2023-12-01 RX ADMIN — LEVALBUTEROL HYDROCHLORIDE 1.25 MG: 1.25 SOLUTION RESPIRATORY (INHALATION) at 15:16

## 2023-12-01 RX ADMIN — AMLODIPINE BESYLATE 10 MG: 5 TABLET ORAL at 09:58

## 2023-12-01 RX ADMIN — FILGRASTIM-AAFI 300 MCG: 300 INJECTION, SOLUTION SUBCUTANEOUS at 12:07

## 2023-12-01 RX ADMIN — FLUCONAZOLE 200 MG: 100 TABLET ORAL at 09:59

## 2023-12-01 RX ADMIN — POTASSIUM CHLORIDE 40 MEQ: 1500 TABLET, EXTENDED RELEASE ORAL at 21:33

## 2023-12-01 RX ADMIN — NICOTINE 7 MG: 7 PATCH, EXTENDED RELEASE TRANSDERMAL at 10:02

## 2023-12-01 RX ADMIN — SERTRALINE 75 MG: 25 TABLET, FILM COATED ORAL at 09:58

## 2023-12-01 RX ADMIN — ACYCLOVIR 400 MG: 200 CAPSULE ORAL at 17:47

## 2023-12-01 RX ADMIN — APIXABAN 5 MG: 5 TABLET, FILM COATED ORAL at 09:59

## 2023-12-01 RX ADMIN — LEVALBUTEROL HYDROCHLORIDE 1.25 MG: 1.25 SOLUTION RESPIRATORY (INHALATION) at 02:29

## 2023-12-01 RX ADMIN — OXYCODONE HYDROCHLORIDE 5 MG: 5 SOLUTION ORAL at 04:19

## 2023-12-01 RX ADMIN — CHLORHEXIDINE GLUCONATE 15 ML: 1.2 SOLUTION ORAL at 09:58

## 2023-12-01 RX ADMIN — IPRATROPIUM BROMIDE 0.5 MG: 0.5 SOLUTION RESPIRATORY (INHALATION) at 15:16

## 2023-12-01 RX ADMIN — ALLOPURINOL 200 MG: 100 TABLET ORAL at 12:07

## 2023-12-01 RX ADMIN — BUDESONIDE 0.5 MG: 0.5 INHALANT ORAL at 19:28

## 2023-12-01 RX ADMIN — POTASSIUM CHLORIDE 40 MEQ: 1.5 SOLUTION ORAL at 17:44

## 2023-12-01 RX ADMIN — IPRATROPIUM BROMIDE 0.5 MG: 0.5 SOLUTION RESPIRATORY (INHALATION) at 19:28

## 2023-12-01 RX ADMIN — FORMOTEROL FUMARATE DIHYDRATE 20 MCG: 20 SOLUTION RESPIRATORY (INHALATION) at 19:28

## 2023-12-01 RX ADMIN — LEVOFLOXACIN 250 MG: 250 TABLET, FILM COATED ORAL at 12:07

## 2023-12-01 RX ADMIN — BUSPIRONE HYDROCHLORIDE 30 MG: 10 TABLET ORAL at 09:58

## 2023-12-01 RX ADMIN — ACYCLOVIR 400 MG: 200 CAPSULE ORAL at 12:06

## 2023-12-01 RX ADMIN — ALBUMIN (HUMAN) 12.5 G: 0.25 INJECTION, SOLUTION INTRAVENOUS at 16:12

## 2023-12-01 RX ADMIN — QUETIAPINE FUMARATE 50 MG: 25 TABLET ORAL at 21:33

## 2023-12-01 NOTE — ASSESSMENT & PLAN NOTE
Wt Readings from Last 3 Encounters:   12/01/23 75.1 kg (165 lb 9.1 oz)   09/07/23 74.6 kg (164 lb 6.4 oz)   08/30/23 73.3 kg (161 lb 9.6 oz)     Lab Results   Component Value Date    LVEF 65 11/21/2023     (H) 10/28/2023     (H) 09/29/2023     Echo 11/21/23: LVEF 65%.        Plan:  Maintain K > 4 and Mg > 2  Measure I/O

## 2023-12-01 NOTE — ASSESSMENT & PLAN NOTE
Home regimen: Coreg 12.5 mg BID, Amlodipine 10 mg daily, and lisinopril 40 mg. Noted to become hypotensive after undergoing chemo cycles. Discontinued Coreg given chemo tx. Plan:  Monitor vitals. Continue Norvasc 10 and lopressor 25 bid. Will discontinue coreg upon discharge.

## 2023-12-01 NOTE — PROGRESS NOTES
Progress Note - Cornelio Alegre 77 y.o. 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 Plan:  Aggressive B Cell Lymphoma + MYC/BCL2  Completion of 4 cycles of R EPOCH during this hospital admission with last cycle ending on 11/29/2023. Counts stable at this time. Did start filgrastim support for white blood cells/neutrophils. Continue prophylaxis as protocol. Continue to monitor blood counts daily. With understanding is that she is planned for discharge on Monday, 12/4/2023. Recommend continuing filgrastim until discharge regardless of blood counts. We are arranging for follow-up with Hematology/MedOnc in the outpatient setting. Subjective:   Sitting up in bed. No complaints. Feels okay. No issues with chemotherapy. Objective:     Vitals: Blood pressure (!) 87/53, pulse 97, temperature 98.9 °F (37.2 °C), temperature source Oral, resp. rate 16, height 5' (1.524 m), weight 75.1 kg (165 lb 9.1 oz), SpO2 95 %. ,Body mass index is 32.33 kg/m².       Intake/Output Summary (Last 24 hours) at 12/1/2023 1552  Last data filed at 12/1/2023 1500  Gross per 24 hour   Intake 1020 ml   Output 375 ml   Net 645 ml       Physical Exam:   Sitting up in Bed  trach with humidified collar  Moist mucus membranes  Normal respiratory effort  AAOx3  Normal affect      Invasive Devices       Central Venous Catheter Line  Duration             CVC Central Lines 10/19/23 Double Right Internal jugular 43 days              Drain  Duration             Gastrostomy/Enterostomy Percutaneous Endoscopic Gastrostomy (PEG) 16 Fr. LUQ 65 days              Airway  Duration             Surgical Airway Shiley Cuffed 75 days                    Lab, Imaging and other studies: I have personally reviewed pertinent reports.     VTE Pharmacologic Prophylaxis: apixiban  VTE Mechanical Prophylaxis: sequential compression device    Gabrielle Curry MD, PhD

## 2023-12-01 NOTE — PROGRESS NOTES
Demonstrated and educated daughter on how to administer medications through peg tube. Allowed daughter to administer medications with me in the room. Daughter had no additional questions.

## 2023-12-01 NOTE — ASSESSMENT & PLAN NOTE
Lab Results   Component Value Date    EGFR 41 12/01/2023    EGFR 40 12/01/2023    EGFR 66 11/30/2023    CREATININE 1.31 (H) 12/01/2023    CREATININE 1.34 (H) 12/01/2023    CREATININE 0.88 11/30/2023     Baseline Cr between 0.75-1.1  Initial PO felt 2/2 prerenal azotemia 2/2 diuresis, reduced PO intake +/- ATN. Patient received 2 doses of Bumex IV 2 g as she had not been responding appropriately to IV 40 Lasix daily. Creatinine went up by 0.5 meeting criteria for an PO. Suspect most likely due to medications as a combination of prerenal and intrinsic. FENa was 0.2%, UA shows no casts or signs of infection, urine eosinophils 0%. Patient likely has prerenal PO from volume depletion. Received 2 L NS and 1 pRBC and cr went up by 0.07 still and Na and Cl went down, suspect that volume prevented further cr rise and free water excretion impaired by kidney function, allowing hyponatremia to increase. Suspect that now that nephrotoxic medications have been stopped she responded well to Lasix and creatinine downtrended. PO now resolved. Will be cautious about nephrotoxins and monitor as restarting EPOCH and ppx. Patient gets volume depleted and overloaded very easily. Especially from chemo. Plan:   Continue to monitor UO/renal function.    Avoid nephrotoxic agents

## 2023-12-01 NOTE — PLAN OF CARE
Problem: MOBILITY - ADULT  Goal: Maintain or return to baseline ADL function  Description: INTERVENTIONS:  -  Assess patient's ability to carry out ADLs; assess patient's baseline for ADL function and identify physical deficits which impact ability to perform ADLs (bathing, care of mouth/teeth, toileting, grooming, dressing, etc.)  - Assess/evaluate cause of self-care deficits   - Assess range of motion  - Assess patient's mobility; develop plan if impaired  - Assess patient's need for assistive devices and provide as appropriate  - Encourage maximum independence but intervene and supervise when necessary  - Involve family in performance of ADLs  - Assess for home care needs following discharge   - Consider OT consult to assist with ADL evaluation and planning for discharge  - Provide patient education as appropriate  Outcome: Progressing     Problem: INFECTION - ADULT  Goal: Absence or prevention of progression during hospitalization  Description: INTERVENTIONS:  - Assess and monitor for signs and symptoms of infection  - Monitor lab/diagnostic results  - Monitor all insertion sites, i.e. indwelling lines, tubes, and drains  - Monitor endotracheal if appropriate and nasal secretions for changes in amount and color  - [place] appropriate cooling/warming therapies per order  - Administer medications as ordered  - Instruct and encourage patient and family to use good hand hygiene technique  - Identify and instruct in appropriate isolation precautions for identified infection/condition  Outcome: Progressing     Problem: PAIN - ADULT  Goal: Verbalizes/displays adequate comfort level or baseline comfort level  Description: Interventions:  - Encourage patient to monitor pain and request assistance  - Assess pain using appropriate pain scale  - Administer analgesics based on type and severity of pain and evaluate response  - Implement non-pharmacological measures as appropriate and evaluate response  - Consider cultural and social influences on pain and pain management  - Notify physician/advanced practitioner if interventions unsuccessful or patient reports new pain  Outcome: Progressing

## 2023-12-01 NOTE — PROGRESS NOTES
[address] [facility]  Progress Note  Name: Inna Waggoner  MRN: [id number]  Unit/Bed#: ICU 03 I Date of Admission: 9/13/2023   Date of Service: 12/1/2023 I Hospital Day: 78    Assessment/Plan   * Acute respiratory failure with hypoxia secondary to oropharyngeal mass  Assessment & Plan  Cuffless trach placed on 9/17. Transitioned to cuffed trach on 10/3. Respiratory breathing has been challenging in the setting of anxiety given recent events. Repeat imaging does not show improvement in bilateral consolidation or atelectasis and groundglass opacities on repeat. S/p bronch. Bronchial cx with 3 colonies CoNS. Completed 7 day course of IV abx w/ cefepime and Vancomycin. Bronchial culture and gram stain negative. Micro negative for CMV, AFB, Fungitell, Pneumocystis jiroveci (carinii), Histoplasma, Aspergillus. Trach secretions sent for sputum culture shows 2+ polys, 1+ gram-positive cocci in pairs, chains and clusters and 1+ gram-negative rods. Cuffless trach was placed 9/17. Replaced with a cuffed Shiley XLT #7 10/3. Has been tolerating nightly BiPAP via trach w/ settings EPAP 8, PSV 4. Etiology: Consider drug induced pneumonitis in the setting of chemo 2/2 newly dx lymphoma. CT chest showed Infiltrates in the lingula and left lower lobe. Improved from prior study   Working with case management to qualify patient for home vent. Plan:  Continue w/ BIPAP via trach at night. EPAP 8, PSV 4. Continue to titrate O2 to maintain SpO2 >85%  Aggressive pulmonary toilet   Incentive Spirometry   Suctioning via trach if patient is desatting and unable to expectorate phlegm. Continue w/ guaifenesin, Atrovent and Xopenex for airway maintenance. Continue inhalation treatment with Pulmicort and formoterol q12 , duonebs QID, atrovent and xopenex   Regular diet  Daily PT/OT    Large cell lymphoma (HCC)  Assessment & Plan  Large B-cell lymphoma, stage II. First chemo cycle 9/22 4 days of R-EPOCH. 9/27 Rituxan. 9/28 Filgrastim. Received nivestym 300 mc 7 days dcd on 10/26   Discontinue Prophylaxis as per Heme Oncology recommendations  Pt had hyperkalemia of 5.5, can be due to Bactrim, consider switching to atovaquone      9/22  4 days of R-EPOCH.  9/27 Rituxan  10/13 for [facility] cycle 2, which was done over 4 days and cytoxan on 10/19.  11/6 completed R-EPOCH cycle 3 and Cytoxan 11/7. Plan:  Pt received her 4th cycle of chemotherapy. Continue Bactrim, Acyclovir, Allopurinol for prophylaxis   Transfuse blood products as needed. Keep Hgb>7 and Plt>20 for chemo   See acute respiratory failure above    Acute embolism and thrombosis of right internal jugular vein (HCC)  Assessment & Plan  CT soft tissues of the neck: Nonocclusive thrombus noted within the right internal jugular vein just above the Mediport entrance site into the internal jugular vein. No signs of pain, headaches, vision changes. Plan:  Continue w/ home Eliquis 5 mg     Oropharyngeal mass  Assessment & Plan  9/14 Neck/ soft tissue CT: Large enhancing soft tissue mass identified within the left neck involving multiple spaces. Centered within the left oropharynx with extensions as described above into multiple spaces. This results in significant airway compromise. 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 Small level 3/level 4 nodes are seen within the neck. Tracheostomy by ENT. Replaced on 9/26. H/o tobacco abuse, daily smoker. On nicotine while inpatient, confirmed with patient she needs nicotine patch. CT soft tissue neck shows reduced mass effect from 9/14 to 10/13. Can consider reducing trach size if continues to improve. Patient often refusing peg tube feeds but is feeding herself small meals orally. Plan:  ENT and heme onc following  Pt received her 4th cycle of chemotherapy   As per speech therapist diet  Dysphagia 2. Pt no longer on PEG tube feeds for nutrition.    See acute respiratory failure and large B cell lymphoma above. Pancytopenia due to chemotherapy [facility])  Assessment & Plan  Recent Labs     11/28/23  0524 11/29/23  0516 11/30/23  0527   HGB 9.7* 10.2* 10.6*       '  Patient received 1 PRBC transfusion on 10/5 and 10/25. Her B/L hemoglobin is 12-14. No sites of bleeding, no black stools. Iron panel indicative of inflammatory anemia. Normal Vitamin B12 levels. Folate low at 5.5. Filgrastim 4 doses administered by heme/onc. Hb declined from 9.9 to 7.4, pt received 1 PRBC transfusion (11/24)    Plan:  Trend hemoglobin and transfuse for <7. As per heme/onc transfuse irradiated and leukoreduced blood products. Trend platelets  Folic acid 1mg daily supplementation  As per conversation with Heme/oncology attending, patient pancytopenia associated with chemotherapy is expected/predictable. No further anemia w/u required. Low ANC management as per heme/onc    Blister (nonthermal) of left forearm, sequela  Assessment & Plan  Blisters of left upper extremity healing, no signs of infection, continue daily wound care. (HFpEF) heart failure with preserved ejection fraction [facility])  Assessment & Plan  Wt Readings from Last 3 Encounters:   11/29/23 78.3 kg (172 lb 9.9 oz)   09/07/23 74.6 kg (164 lb 6.4 oz)   08/30/23 73.3 kg (161 lb 9.6 oz)     Lab Results   Component Value Date    LVEF 65 11/21/2023     (H) 10/28/2023     (H) 09/29/2023     Echo 11/21/23: LVEF 65%. Plan:  K > 4   Measure I/O    Ambulatory dysfunction  Assessment & Plan  2/2 Impacted by chronic pain syndrome + prolonged hospital stay. Continue OOB with PT. PT rec post acute rehab however reportedly Cannot get LTACH placement while getting chemo per CM  She is aware STR SNFs continue to follow and treatment can be done from a SNF level of care. PT would need to be on trach collar for at least 72 hours with no need for the vent.  Aware that pt would need to be around 28% FiO2     Depression  Assessment & Plan  Patient on Zoloft 76 daily and Seroquel hs  Patient is depressed, multiple family/palliative discussions. patient is firm that she wants to live and to fight, she is just tired. Currently goal is disease treatment oriented. Full code. No SI/HI ideations. Palliative signed off, will reconsult if patient changes her mind regarding wishes. Chronic Superficial thrombophlebitis  Assessment & Plan  Right forearm soreness. RUE US: No acute or chronic deep vein thrombosis. 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 PO not severe enough to require renal dosing at this time, will continue to monitor and adjust dosing as needed. Continue DVT ppx with Eliquis     CKD (chronic kidney disease) stage 3, GFR 30-59 ml/min [facility])  Assessment & Plan  Lab Results   Component Value Date    EGFR 76 11/29/2023    EGFR 58 11/28/2023    EGFR 48 11/27/2023    CREATININE 0.79 11/29/2023    CREATININE 0.98 11/28/2023    CREATININE 1.15 11/27/2023     Baseline Cr between 0.75-1.1  Initial PO felt 2/2 prerenal azotemia 2/2 diuresis, reduced PO intake +/- ATN. Patient received 2 doses of Bumex IV 2 g as she had not been responding appropriately to IV 40 Lasix daily. Creatinine went up by 0.5 meeting criteria for an PO. Suspect most likely due to medications as a combination of prerenal and intrinsic. FENa was 0.2%, UA shows no casts or signs of infection, urine eosinophils 0%. Patient likely has prerenal PO from volume depletion. Received 2 L NS and 1 pRBC and cr went up by 0.07 still and Na and Cl went down, suspect that volume prevented further cr rise and free water excretion impaired by kidney function, allowing hyponatremia to increase. Suspect that now that nephrotoxic medications have been stopped she responded well to Lasix and creatinine downtrended. PO now resolved. Will be cautious about nephrotoxins and monitor as restarting EPOCH and ppx. Patient gets volume depleted and overloaded very easily.  Especially from chemo.     Plan:   Continue to monitor UO/renal function. Avoid nephrotoxic agents  Follow up with BMP in the morning   Continue to monitor a.m. BMP. Pain syndrome, chronic  Assessment & Plan  Home regimen: Oxycodone 5 mg BID, Tylenol 650 mg q8 prn. Gabapentin 600 mg TID. Medical marijuana. Lumbar radiculopathy and impaired ambulatory dysfunction secondary to pain. Has bowel regimen and is having bowel movements daily. Patient required additional pain management during previous cycle and has some additional pain from tunneled line placement    Plan:  Continue gabapentin 300 mg TID, oxycodone 5 mg TID, prn Tylenol 650 mg q6. Oxycodone 5mg every 8 hours  Oxycodone 2.5 mg q6 PRN    Benign essential hypertension  Assessment & Plan  Home regimen Coreg 12.5 mg BID, Amlodipine 10 mg daily, and lisinopril 40 mg. All discontinued at this time secondary to hypotension and PO since cycle 1. but stable currently without any antihypertensives. Systolic persistently elevated and tachycardic, potentially secondary to pain. Patient was more hypotensive during last chemo. Coreg contraindicated during chemo, since cycles are every other week, would be better to stick with lopressor during duration of therapy as opposed to switching constantly. Plan:  Monitor vitals. Continue Norvasc 10 and lopressor 25 bid    Protein-calorie malnutrition ([address])  Assessment & Plan  Malnutrition Findings:   Adult Malnutrition type: Chronic illness  Adult Degree of Malnutrition: Unspecified protein calorie malnutrition  Malnutrition Characteristics: Inadequate energy, Other (comment) (stress of illness)                  360 Statement: Protein calorie malnutrition r/t inadequate intake as evidenced by >7 day period of poor intakes and stress of infection; currently treated with regular diet and nutrition supplements    BMI Findings: Body mass index is 33.71 kg/m².                 Disposition: Stepdown Level 1    ICU Core Measures     Vented Patient  VAP Bundle  VAP bundle ordered     A: Assess, Prevent, and Manage Pain Has pain been assessed? Yes  Need for changes to pain regimen? No   B: Both Spontaneous Awakening Trials (SATs) and Spontaneous Breathing Trials (SBTs) Plan to perform spontaneous awakening trial today? N/A   Plan to perform spontaneous breathing trial today? N/A   Obvious barriers to extubation? NA   C: Choice of Sedation RASS Goal: 0 Alert and Calm  Need for changes to sedation or analgesia regimen? No   D: Delirium CAM-ICU: Negative   E: Early Mobility  Plan for early mobility? NA   F: Family Engagement Plan for family engagement today? NA       Antibiotic Review: Patient on appropriate coverage based on culture data. Review of Invasive Devices:      Central access plan: Medications requiring central line      Prophylaxis:  VTE VTE covered by:  apixaban, Oral, 5 mg at 11/30/23 2059       Stress Ulcer  covered byfamotidine (PEPCID) tablet 20 mg [[id number]]         Significant 24hr Events     24hr events: Pt was anxious over the night, required suctioning and change of the inner canula. Otherwise patient tolerated pressure support ventilation well. Subjective   Review of Systems   Constitutional:  Negative for chills, fatigue and fever. HENT:  Positive for voice change. Respiratory:  Negative for cough, chest tightness and shortness of breath. Cardiovascular:  Negative for chest pain, palpitations and leg swelling. Gastrointestinal:  Negative for abdominal pain, diarrhea, nausea and vomiting. Endocrine: Negative. Genitourinary: Negative. Neurological: Negative. Negative for dizziness, light-headedness and headaches. Hematological: Negative. Psychiatric/Behavioral: Negative. All other systems reviewed and are negative.      Objective                            Vitals I/O      Most Recent Min/Max in 24hrs   Temp 99.5 °F (37.5 °C) Temp  Min: 98 °F (36.7 °C)  Max: 99.5 °F (37.5 °C) Pulse 95 Pulse  Min: 80  Max: 121   Resp 18 Resp  Min: 11  Max: 18   /64 BP  Min: 105/63  Max: 136/80   O2 Sat 98 % SpO2  Min: 85 %  Max: 99 %      Intake/Output Summary (Last 24 hours) at 12/1/2023 5043  Last data filed at 11/30/2023 2000  Gross per 24 hour   Intake 200 ml   Output --   Net 200 ml       Diet Regular; Regular House    Invasive Monitoring           Physical Exam   Physical Exam  Eyes:      General: Vision grossly intact. Extraocular Movements: Extraocular movements intact. Conjunctiva/sclera: Conjunctivae normal.      Pupils: Pupils are equal, round, and reactive to light. Skin:     General: Skin is warm. HENT:      Head: Normocephalic and atraumatic. Right Ear: Hearing normal.      Left Ear: Hearing normal.      Mouth/Throat:      Mouth: Mucous membranes are moist.   Neck:      Trachea: Tracheostomy present. Comments: tunneled DL RIJ catheter w/o purulence  Cardiovascular:      Rate and Rhythm: Normal rate and regular rhythm. Pulses: Normal pulses. Heart sounds: Normal heart sounds. Abdominal: General: Bowel sounds are normal.   Constitutional:       Appearance: She is ill-appearing. Pulmonary:      Effort: No respiratory distress. Breath sounds: Rhonchi present. Chest:      Chest wall: No tenderness. Neurological:      Mental Status: She is alert and oriented to person, place and time. Diagnostic Studies      Imaging: No imaging done in the past 24 hrs.      Medications:  Scheduled PRN   acyclovir, 400 mg, BID  allopurinol, 200 mg, 4x Daily  amLODIPine, 10 mg, Daily  apixaban, 5 mg, BID  budesonide, 0.5 mg, Q12H  busPIRone, 30 mg, TID  chlorhexidine, 15 mL, Q12H ASHLEY  famotidine, 20 mg, BID  Filgrastim-aafi, 300 mcg, Daily  fluconazole, 709 mg, Daily  folic acid, 1 mg, Daily  formoterol, 20 mcg, Q12H  gabapentin, 300 mg, TID  levalbuterol, 1.25 mg, Q6H   And  ipratropium, 0.5 mg, Q6H  levalbuterol, 1.25 mg, Once  levofloxacin, 250 mg, Q24H [address]  melatonin, 3 mg, HS  metoprolol tartrate, 25 mg, Q12H ASHLEY  nicotine, 7 mg, Daily  oxyCODONE, 5 mg, Q8H  polyethylene glycol, 17 g, Daily  potassium chloride, 20 mEq, Once  QUEtiapine, 50 mg, HS  senna, 17.6 mg, BID  sertraline, 75 mg, Daily  sulfamethoxazole-trimethoprim, 1 tablet, Once per day on Mon Wed Fri      alteplase, 2 mg, Q1MIN PRN  alteplase, 2 mg, Q1MIN PRN  alteplase, 2 mg, Q1MIN PRN  alteplase, 2 mg, Q1MIN PRN  alteplase, 2 mg, Q1MIN PRN  levalbuterol, 1.25 mg, Q4H PRN  ondansetron, 4 mg, Q8H PRN  ondansetron, 4 mg, Q8H PRN  oxyCODONE, 2.5 mg, Q6H PRN       Continuous          Labs:    CBC    Recent Labs     11/30/23  0527   WBC 10.30*   HGB 10.6*   HCT 32.8*   *     BMP    Recent Labs     11/30/23  0749 12/01/23  0710   SODIUM 136 138   K 3.6 3.6   CL 98 101   CO2 30 30   AGAP 8 7   BUN 25 36*   CREATININE 0.88 1.34*   CALCIUM 9.4 9.3       Coags    No recent results     Additional Electrolytes  Recent Labs     11/29/23  1315   MG 2.9*          Blood Gas    No recent results  No recent results LFTs  No recent results    Infectious  No recent results  Glucose  Recent Labs     11/30/23  0749 12/01/23  0710   GLUC 100 105             Savita Mcghee MD

## 2023-12-01 NOTE — CASE MANAGEMENT
Case Management Progress Note    Patient name Ju [facility]  Location ICU 03/ICU 03 MRN [id number]  : 1955 Date 2023       LOS (days): 78  Geometric Mean LOS (GMLOS) (days): 26.10  Days to GMLOS:-52.7        OBJECTIVE:        Current admission status: Inpatient  Preferred Pharmacy:   [id number] [place] - [address]. [address] [place] [address]  Phone: [phone] Fax: [phone] [place] [employer] ([facility]) [place], [place] - [address] [address]  Phone: [phone] Fax: [phone]    Primary Care Provider: José Luis Tatum MD    Primary Insurance: [facility] REP  Secondary Insurance: [facility]    PROGRESS NOTE:    Email received from pt's daughter requesting letter for her employer: [employer] that she will need to be off of work from Dec 4th - Dec 15th due to pt medical needs. CM notified Dr. Juana Narvaez of the letter the Daughter will need for her employment. He will complete letter and notify CM when done to review with the daughter. CM also spoke with \[facility]\"". Eliquis is $0 - CM notified Dr. Juana Narvaez. He will also plan to send the additional medications pt will need at home ot the pharmacy today to check cost/coverage. CM notified by [address] that the home vent will be dropped off this afternoon. Julian Johnston has been in communication with RT here at [facility] and they are aware. CM also confirmed with RT that pt will be on 10-15L of O2 @ Home. CM provided this to Julian Johnston to [address] [place] [address]. They will have concentrator's delivered today. CM left  for pt's , Jose Borrego, requesting return call to confirm she received pt clinicals or if more is needed. Round Trip Reqeust made for BLS to Home on  @ 12 p.m. Letter for the daughter completed by the Resident. CM did review with pt's Daughter, Sonya Light.  Confirmed letter will be good. SHIRA faxed to [facility] at her request. Attention: Peng Metcalf. Fax number: [phone]. CM also placed copy in pt room for L[place]. All DME being delivered this afternoon. Kb Sinha going home to receive DME. Aware medical team is here all weekend with any questions leading up to dc on Monday. Linnette and pt aware that SHIRA has requested a 12:00 p.m.  on Monday for dc to home.

## 2023-12-01 NOTE — ASSESSMENT & PLAN NOTE
Home regimen: Oxycodone 5 mg BID, Tylenol 650 mg q8 prn. Gabapentin 600 mg TID. Medical marijuana. Lumbar radiculopathy and impaired ambulatory dysfunction secondary to pain. Has bowel regimen and is having bowel movements daily. Patient required additional pain management during previous cycle and has some additional pain from tunneled line placement    Plan:  Continue gabapentin 300 mg TID, oxycodone 5 mg TID, prn Tylenol 650 mg q6. Oxycodone 5mg every 8 hours  Oxycodone 2.5 mg q6 PRN  Patient will be discharged on short course of pain meds, but will follow-up with PCP for further management.

## 2023-12-01 NOTE — ASSESSMENT & PLAN NOTE
Patient on Zoloft 75 daily and Seroquel hs   Had multiple family/palliative discussions. Currently goal is disease treatment oriented. Full code. No SI/HI ideations. Palliative signed off, patient declines intervention and will follow-up outpatient. Given decline in mood, will consider increasing Sertraline to 100mg daily.

## 2023-12-01 NOTE — ASSESSMENT & PLAN NOTE
Cuffless trach placed on 9/17. Transitioned to cuffed trach on 10/3. Respiratory breathing has been challenging in the setting of anxiety given recent events. Repeat imaging does not show improvement in bilateral consolidation or atelectasis and groundglass opacities on repeat. S/p bronch. Bronchial cx with 3 colonies CoNS. Completed 7 day course of IV abx w/ cefepime and Vancomycin. Bronchial culture and gram stain negative. Micro negative for CMV, AFB, Fungitell, Pneumocystis jiroveci (carinii), Histoplasma, Aspergillus. Trach secretions sent for sputum culture shows 2+ polys, 1+ gram-positive cocci in pairs, chains and clusters and 1+ gram-negative rods. Cuffless trach was placed 9/17. Replaced with a cuffed Shiley XLT #7 10/3. Etiology: Drug induced pneumonitis in the setting of chemo 2/2 newly dx lymphoma. CT chest showed Infiltrates in the lingula and left lower lobe. Improved from prior study. Received home vent yesterday and tolerated it overnight. No changes made to vent. Plan:  Continue w/ PS 12/8 40% with cuff up overnight. During day on 10L w/ FiO2 40% with cuff down. Goal setting 4/8 at FiO2 30%. Continue to titrate O2 to maintain SpO2 >85%  Aggressive pulmonary toilet   Incentive Spirometry   Suctioning via trach if patient is desatting and unable to expectorate phlegm. Continue w/ guaifenesin, Atrovent and Xopenex for airway maintenance.     Continue inhalation treatment with Pulmicort and formoterol q12 , duonebs QID, atrovent and xopenex   Regular diet  Daily PT/OT

## 2023-12-01 NOTE — ASSESSMENT & PLAN NOTE
Right forearm soreness. RUE US: No acute or chronic deep vein thrombosis. 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 PO not severe enough to require renal dosing at this time, will continue to monitor and adjust dosing as needed. Given that patient is already on Eliquis, this will serve as VTE ppx.

## 2023-12-01 NOTE — ASSESSMENT & PLAN NOTE
9/14 Neck/ soft tissue CT: Large enhancing soft tissue mass identified within the left neck involving multiple spaces. Centered within the left oropharynx with extensions as described above into multiple spaces. This results in significant airway compromise. 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 Small level 3/level 4 nodes are seen within the neck. Tracheostomy by ENT. Replaced on 9/26. H/o tobacco abuse, daily smoker. On nicotine while inpatient, confirmed with patient she needs nicotine patch. CT soft tissue neck shows reduced mass effect from 9/14 to 10/13. Can consider reducing trach size if continues to improve. Patient often refusing peg tube feeds but is feeding herself small meals orally. ENT and heme onc following  Pt received her 4th cycle of chemotherapy     Plan:  As per speech therapist diet  Dysphagia 2. Pt no longer on PEG tube feeds for nutrition. See acute respiratory failure and large B cell lymphoma above.

## 2023-12-01 NOTE — ASSESSMENT & PLAN NOTE
Lab Results   Component Value Date    CREATININE 1.31 (H) 12/01/2023    CREATININE 1.34 (H) 12/01/2023    CREATININE 0.88 11/30/2023     Likely prerenal.  Second to poor oral intake versus poor urine output. Baseline creatinine 1 to 1.2.     Cr at baseline  Plan:  Urinary retention protocol, Daily weights, I/O  Trend Cr daily

## 2023-12-01 NOTE — CASE MANAGEMENT
Case Management Progress Note    Patient name Raissa Hassan  Location ICU 03/ICU 03 MRN [id number]  : 1955 Date 2023       LOS (days): 78  Geometric Mean LOS (GMLOS) (days): 26.10  Days to GMLOS:-53        OBJECTIVE:        Current admission status: Inpatient  Preferred Pharmacy:   [facility]/pharmacy #[id number]- LIZZY GARAY - [address]. [address] [facility] [address]  Phone: [phone] Fax: [address] [place] [address]  Phone: [phone] Fax: [phone]    Primary Care Provider: Javi Wellington MD    Primary Insurance: [employer]   Secondary Insurance: [facility]    PROGRESS NOTE:    Spoke with [facility] regarding scripts that were send for pt medication. Spoke with the pharmacy. All medication covered with $0 co-pay. However, the Zoloft will require prior authorization. The Physician will need to contact pt insurance to complete. Phone Number: [phone]. CM provided to Dr. Candis Horan.

## 2023-12-01 NOTE — ASSESSMENT & PLAN NOTE
Large B-cell lymphoma, stage II. First chemo cycle 9/22 4 days of R-EPOCH. 9/27 Rituxan. 9/28 Filgrastim. Received nivestym 300 mc 7 days dcd on 10/26   Discontinue Prophylaxis as per Heme Oncology recommendations  Pt had hyperkalemia of 5.5, can be due to Bactrim, consider switching to atovaquone      9/22  4 days of R-EPOCH.  9/27 Rituxan  10/13 for [facility] cycle 2, which was done over 4 days and cytoxan on 10/19.  11/6 completed R-EPOCH cycle 3 and Cytoxan 11/7. Completed 4th cycle of chemotherapy from 11/24-11/28. Plan:  Continue Bactrim, Acyclovir, Allopurinol for prophylaxis   Transfuse blood products as needed.   Keep Hgb>7 and Plt>20 for chemo   See acute respiratory failure above

## 2023-12-02 LAB
ANION GAP SERPL CALCULATED.3IONS-SCNC: 5 MMOL/L
ANISOCYTOSIS BLD QL SMEAR: PRESENT
ATRIAL RATE: 95 BPM
BASOPHILS # BLD MANUAL: 0.04 THOUSAND/UL (ref 0–0.1)
BASOPHILS NFR MAR MANUAL: 1 % (ref 0–1)
BUN SERPL-MCNC: 30 MG/DL (ref 5–25)
CALCIUM SERPL-MCNC: 9.2 MG/DL (ref 8.4–10.2)
CHLORIDE SERPL-SCNC: 105 MMOL/L (ref 96–108)
CO2 SERPL-SCNC: 28 MMOL/L (ref 21–32)
CREAT SERPL-MCNC: 1.28 MG/DL (ref 0.6–1.3)
EOSINOPHIL # BLD MANUAL: 0 THOUSAND/UL (ref 0–0.4)
EOSINOPHIL NFR BLD MANUAL: 0 % (ref 0–6)
ERYTHROCYTE [DISTWIDTH] IN BLOOD BY AUTOMATED COUNT: 15.6 % (ref 11.6–15.1)
GFR SERPL CREATININE-BSD FRML MDRD: 42 ML/MIN/1.73SQ M
GLUCOSE SERPL-MCNC: 153 MG/DL (ref 65–140)
HCT VFR BLD AUTO: 23.5 % (ref 34.8–46.1)
HGB BLD-MCNC: 7.3 G/DL (ref 11.5–15.4)
HYPERCHROMIA BLD QL SMEAR: PRESENT
LYMPHOCYTES # BLD AUTO: 0.26 THOUSAND/UL (ref 0.6–4.47)
LYMPHOCYTES # BLD AUTO: 6 % (ref 14–44)
MAGNESIUM SERPL-MCNC: 2.1 MG/DL (ref 1.9–2.7)
MCH RBC QN AUTO: 29.7 PG (ref 26.8–34.3)
MCHC RBC AUTO-ENTMCNC: 31.1 G/DL (ref 31.4–37.4)
MCV RBC AUTO: 96 FL (ref 82–98)
MONOCYTES # BLD AUTO: 0 THOUSAND/UL (ref 0–1.22)
MONOCYTES NFR BLD: 0 % (ref 4–12)
NEUTROPHILS # BLD MANUAL: 4.04 THOUSAND/UL (ref 1.85–7.62)
NEUTS BAND NFR BLD MANUAL: 1 % (ref 0–8)
NEUTS SEG NFR BLD AUTO: 92 % (ref 43–75)
P AXIS: 35 DEGREES
PLATELET # BLD AUTO: 180 THOUSANDS/UL (ref 149–390)
PLATELET BLD QL SMEAR: ADEQUATE
PMV BLD AUTO: 9.9 FL (ref 8.9–12.7)
POTASSIUM SERPL-SCNC: 4.2 MMOL/L (ref 3.5–5.3)
PR INTERVAL: 170 MS
QRS AXIS: 60 DEGREES
QRSD INTERVAL: 94 MS
QT INTERVAL: 348 MS
QTC INTERVAL: 437 MS
RBC # BLD AUTO: 2.46 MILLION/UL (ref 3.81–5.12)
RBC MORPH BLD: PRESENT
SODIUM SERPL-SCNC: 138 MMOL/L (ref 135–147)
T WAVE AXIS: 14 DEGREES
VENTRICULAR RATE: 95 BPM
WBC # BLD AUTO: 4.34 THOUSAND/UL (ref 4.31–10.16)

## 2023-12-02 PROCEDURE — 83735 ASSAY OF MAGNESIUM: CPT

## 2023-12-02 PROCEDURE — 80048 BASIC METABOLIC PNL TOTAL CA: CPT

## 2023-12-02 PROCEDURE — 99232 SBSQ HOSP IP/OBS MODERATE 35: CPT | Performed by: INTERNAL MEDICINE

## 2023-12-02 PROCEDURE — 94669 MECHANICAL CHEST WALL OSCILL: CPT

## 2023-12-02 PROCEDURE — 85007 BL SMEAR W/DIFF WBC COUNT: CPT

## 2023-12-02 PROCEDURE — 85027 COMPLETE CBC AUTOMATED: CPT

## 2023-12-02 PROCEDURE — 94760 N-INVAS EAR/PLS OXIMETRY 1: CPT

## 2023-12-02 PROCEDURE — 94640 AIRWAY INHALATION TREATMENT: CPT

## 2023-12-02 RX ADMIN — APIXABAN 5 MG: 5 TABLET, FILM COATED ORAL at 21:07

## 2023-12-02 RX ADMIN — ALLOPURINOL 200 MG: 100 TABLET ORAL at 21:07

## 2023-12-02 RX ADMIN — ACYCLOVIR 400 MG: 200 CAPSULE ORAL at 17:19

## 2023-12-02 RX ADMIN — ALLOPURINOL 200 MG: 100 TABLET ORAL at 09:10

## 2023-12-02 RX ADMIN — QUETIAPINE FUMARATE 50 MG: 25 TABLET ORAL at 21:07

## 2023-12-02 RX ADMIN — LEVOFLOXACIN 250 MG: 250 TABLET, FILM COATED ORAL at 09:10

## 2023-12-02 RX ADMIN — BUSPIRONE HYDROCHLORIDE 30 MG: 10 TABLET ORAL at 09:12

## 2023-12-02 RX ADMIN — IPRATROPIUM BROMIDE 0.5 MG: 0.5 SOLUTION RESPIRATORY (INHALATION) at 08:55

## 2023-12-02 RX ADMIN — CHLORHEXIDINE GLUCONATE 15 ML: 1.2 SOLUTION ORAL at 21:07

## 2023-12-02 RX ADMIN — MELATONIN 3 MG: at 21:07

## 2023-12-02 RX ADMIN — BUSPIRONE HYDROCHLORIDE 30 MG: 10 TABLET ORAL at 16:21

## 2023-12-02 RX ADMIN — LEVALBUTEROL HYDROCHLORIDE 1.25 MG: 1.25 SOLUTION RESPIRATORY (INHALATION) at 08:55

## 2023-12-02 RX ADMIN — IPRATROPIUM BROMIDE 0.5 MG: 0.5 SOLUTION RESPIRATORY (INHALATION) at 19:50

## 2023-12-02 RX ADMIN — OXYCODONE HYDROCHLORIDE 5 MG: 5 SOLUTION ORAL at 13:29

## 2023-12-02 RX ADMIN — BUSPIRONE HYDROCHLORIDE 30 MG: 10 TABLET ORAL at 21:07

## 2023-12-02 RX ADMIN — METOPROLOL TARTRATE 25 MG: 25 TABLET, FILM COATED ORAL at 09:09

## 2023-12-02 RX ADMIN — FAMOTIDINE 20 MG: 20 TABLET, FILM COATED ORAL at 09:09

## 2023-12-02 RX ADMIN — AMLODIPINE BESYLATE 10 MG: 5 TABLET ORAL at 09:10

## 2023-12-02 RX ADMIN — IPRATROPIUM BROMIDE 0.5 MG: 0.5 SOLUTION RESPIRATORY (INHALATION) at 13:00

## 2023-12-02 RX ADMIN — ACYCLOVIR 400 MG: 200 CAPSULE ORAL at 09:11

## 2023-12-02 RX ADMIN — IPRATROPIUM BROMIDE 0.5 MG: 0.5 SOLUTION RESPIRATORY (INHALATION) at 01:00

## 2023-12-02 RX ADMIN — LEVALBUTEROL HYDROCHLORIDE 1.25 MG: 1.25 SOLUTION RESPIRATORY (INHALATION) at 01:00

## 2023-12-02 RX ADMIN — FAMOTIDINE 20 MG: 20 TABLET, FILM COATED ORAL at 17:20

## 2023-12-02 RX ADMIN — SENNOSIDES 17.6 MG: 8.8 SYRUP ORAL at 17:20

## 2023-12-02 RX ADMIN — CHLORHEXIDINE GLUCONATE 15 ML: 1.2 SOLUTION ORAL at 09:09

## 2023-12-02 RX ADMIN — NICOTINE 7 MG: 7 PATCH, EXTENDED RELEASE TRANSDERMAL at 09:10

## 2023-12-02 RX ADMIN — GABAPENTIN 300 MG: 300 CAPSULE ORAL at 21:07

## 2023-12-02 RX ADMIN — LEVALBUTEROL HYDROCHLORIDE 1.25 MG: 1.25 SOLUTION RESPIRATORY (INHALATION) at 13:01

## 2023-12-02 RX ADMIN — FLUCONAZOLE 200 MG: 100 TABLET ORAL at 09:09

## 2023-12-02 RX ADMIN — METOPROLOL TARTRATE 25 MG: 25 TABLET, FILM COATED ORAL at 21:07

## 2023-12-02 RX ADMIN — ALLOPURINOL 200 MG: 100 TABLET ORAL at 13:29

## 2023-12-02 RX ADMIN — BUDESONIDE 0.5 MG: 0.5 INHALANT ORAL at 19:50

## 2023-12-02 RX ADMIN — SERTRALINE 75 MG: 25 TABLET, FILM COATED ORAL at 09:10

## 2023-12-02 RX ADMIN — ALLOPURINOL 200 MG: 100 TABLET ORAL at 17:20

## 2023-12-02 RX ADMIN — FOLIC ACID 1 MG: 1 TABLET ORAL at 09:10

## 2023-12-02 RX ADMIN — FORMOTEROL FUMARATE DIHYDRATE 20 MCG: 20 SOLUTION RESPIRATORY (INHALATION) at 09:35

## 2023-12-02 RX ADMIN — GABAPENTIN 300 MG: 300 CAPSULE ORAL at 16:20

## 2023-12-02 RX ADMIN — BUDESONIDE 0.5 MG: 0.5 INHALANT ORAL at 08:56

## 2023-12-02 RX ADMIN — GABAPENTIN 300 MG: 300 CAPSULE ORAL at 09:09

## 2023-12-02 RX ADMIN — FORMOTEROL FUMARATE DIHYDRATE 20 MCG: 20 SOLUTION RESPIRATORY (INHALATION) at 19:50

## 2023-12-02 RX ADMIN — OXYCODONE HYDROCHLORIDE 5 MG: 5 SOLUTION ORAL at 21:07

## 2023-12-02 RX ADMIN — FILGRASTIM-AAFI 300 MCG: 300 INJECTION, SOLUTION SUBCUTANEOUS at 13:30

## 2023-12-02 RX ADMIN — APIXABAN 5 MG: 5 TABLET, FILM COATED ORAL at 09:10

## 2023-12-02 RX ADMIN — LEVALBUTEROL HYDROCHLORIDE 1.25 MG: 1.25 SOLUTION RESPIRATORY (INHALATION) at 19:50

## 2023-12-02 NOTE — PROGRESS NOTES
[facility]  Progress Note  Name: Ramon Gerber  MRN: [id number]  Unit/Bed#: ICU 03 I Date of Admission: 9/13/2023   Date of Service: 12/2/2023 I Hospital Day: 80    Assessment/Plan   * Acute respiratory failure with hypoxia secondary to oropharyngeal mass  Assessment & Plan  Cuffless trach placed on 9/17. Transitioned to cuffed trach on 10/3. Respiratory breathing has been challenging in the setting of anxiety given recent events. Repeat imaging does not show improvement in bilateral consolidation or atelectasis and groundglass opacities on repeat. S/p bronch. Bronchial cx with 3 colonies CoNS. Completed 7 day course of IV abx w/ cefepime and Vancomycin. Bronchial culture and gram stain negative. Micro negative for CMV, AFB, Fungitell, Pneumocystis jiroveci (carinii), Histoplasma, Aspergillus. Trach secretions sent for sputum culture shows 2+ polys, 1+ gram-positive cocci in pairs, chains and clusters and 1+ gram-negative rods. Cuffless trach was placed 9/17. Replaced with a cuffed Shiley XLT #7 10/3. Etiology: Drug induced pneumonitis in the setting of chemo 2/2 newly dx lymphoma. CT chest showed Infiltrates in the lingula and left lower lobe. Improved from prior study. Received home vent yesterday and tolerated it overnight. No changes made to vent. Plan:  Continue w/ PS 12/8 40% with cuff up overnight. During day on 10L w/ FiO2 40% with cuff down. Goal setting 4/8 at FiO2 30%. Continue to titrate O2 to maintain SpO2 >85%  Aggressive pulmonary toilet   Incentive Spirometry   Suctioning via trach if patient is desatting and unable to expectorate phlegm. Continue w/ guaifenesin, Atrovent and Xopenex for airway maintenance. Continue inhalation treatment with Pulmicort and formoterol q12 , duonebs QID, atrovent and xopenex   Regular diet  Daily PT/OT    Large cell lymphoma (HCC)  Assessment & Plan  Large B-cell lymphoma, stage II.  First chemo cycle 9/22 4 days of R-EPOCH. 9/27 Rituxan. 9/28 Filgrastim. Received nivestym 300 mc 7 days dcd on 10/26   Discontinue Prophylaxis as per Heme Oncology recommendations  Pt had hyperkalemia of 5.5, can be due to Bactrim, consider switching to atovaquone      9/22  4 days of R-EPOCH.  9/27 Rituxan  10/13 for [facility] cycle 2, which was done over 4 days and cytoxan on 10/19.  11/6 completed R-EPOCH cycle 3 and Cytoxan 11/7. Completed 4th cycle of chemotherapy from 11/24-11/28. Plan:  Continue Bactrim, Acyclovir, Allopurinol for prophylaxis   Transfuse blood products as needed. Keep Hgb>7 and Plt>20 for chemo   See acute respiratory failure above    Acute embolism and thrombosis of right internal jugular vein (HCC)  Assessment & Plan  CT soft tissues of the neck: Nonocclusive thrombus noted within the right internal jugular vein just above the Mediport entrance site into the internal jugular vein. No signs of pain, headaches, vision changes. Plan:  Continue w/ home Eliquis 5 mg     Oropharyngeal mass  Assessment & Plan  9/14 Neck/ soft tissue CT: Large enhancing soft tissue mass identified within the left neck involving multiple spaces. Centered within the left oropharynx with extensions as described above into multiple spaces. This results in significant airway compromise. 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 Small level 3/level 4 nodes are seen within the neck. Tracheostomy by ENT. Replaced on 9/26. H/o tobacco abuse, daily smoker. On nicotine while inpatient, confirmed with patient she needs nicotine patch. CT soft tissue neck shows reduced mass effect from 9/14 to 10/13. Can consider reducing trach size if continues to improve. Patient often refusing peg tube feeds but is feeding herself small meals orally. ENT and heme onc following  Pt received her 4th cycle of chemotherapy     Plan:  As per speech therapist diet  Dysphagia 2. Pt no longer on PEG tube feeds for nutrition.    See acute respiratory failure and large B cell lymphoma above. PO (acute kidney injury) (HCC)-resolved as of 9/21/2023  Assessment & Plan  Lab Results   Component Value Date    CREATININE 1.31 (H) 12/01/2023    CREATININE 1.34 (H) 12/01/2023    CREATININE 0.88 11/30/2023     Likely prerenal.  Second to poor oral intake versus poor urine output. Baseline creatinine 1 to 1.2. Cr at baseline  Plan:  Urinary retention protocol, Daily weights, I/O  Trend Cr daily    Blister (nonthermal) of left forearm, sequela  Assessment & Plan  Blisters of left upper extremity healing, no signs of infection, continue daily wound care. (HFpEF) heart failure with preserved ejection fraction [facility])  Assessment & Plan  Wt Readings from Last 3 Encounters:   12/01/23 75.1 kg (165 lb 9.1 oz)   09/07/23 74.6 kg (164 lb 6.4 oz)   08/30/23 73.3 kg (161 lb 9.6 oz)     Lab Results   Component Value Date    LVEF 65 11/21/2023     (H) 10/28/2023     (H) 09/29/2023     Echo 11/21/23: LVEF 65%. Plan:  Maintain K > 4 and Mg > 2  Measure I/O    Ambulatory dysfunction  Assessment & Plan  2/2 Impacted by chronic pain syndrome + prolonged hospital stay. Continue OOB with PT. Depression  Assessment & Plan  Patient on Zoloft 75 daily and Seroquel hs   Had multiple family/palliative discussions. Currently goal is disease treatment oriented. Full code. No SI/HI ideations. Palliative signed off, patient declines intervention and will follow-up outpatient. Given decline in mood, will consider increasing Sertraline to 100mg daily. Chronic Superficial thrombophlebitis  Assessment & Plan  Right forearm soreness. RUE US: No acute or chronic deep vein thrombosis. 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 PO not severe enough to require renal dosing at this time, will continue to monitor and adjust dosing as needed. Given that patient is already on Eliquis, this will serve as VTE ppx.     CKD (chronic kidney disease) stage 3, GFR 30-59 ml/min [facility])  Assessment & Plan  Lab Results   Component Value Date    EGFR 41 12/01/2023    EGFR 40 12/01/2023    EGFR 66 11/30/2023    CREATININE 1.31 (H) 12/01/2023    CREATININE 1.34 (H) 12/01/2023    CREATININE 0.88 11/30/2023     Baseline Cr between 0.75-1.1  Initial PO felt 2/2 prerenal azotemia 2/2 diuresis, reduced PO intake +/- ATN. Patient received 2 doses of Bumex IV 2 g as she had not been responding appropriately to IV 40 Lasix daily. Creatinine went up by 0.5 meeting criteria for an PO. Suspect most likely due to medications as a combination of prerenal and intrinsic. FENa was 0.2%, UA shows no casts or signs of infection, urine eosinophils 0%. Patient likely has prerenal PO from volume depletion. Received 2 L NS and 1 pRBC and cr went up by 0.07 still and Na and Cl went down, suspect that volume prevented further cr rise and free water excretion impaired by kidney function, allowing hyponatremia to increase. Suspect that now that nephrotoxic medications have been stopped she responded well to Lasix and creatinine downtrended. PO now resolved. Will be cautious about nephrotoxins and monitor as restarting EPOCH and ppx. Patient gets volume depleted and overloaded very easily. Especially from chemo. Plan:   Continue to monitor UO/renal function. Avoid nephrotoxic agents    Pain syndrome, chronic  Assessment & Plan  Home regimen: Oxycodone 5 mg BID, Tylenol 650 mg q8 prn. Gabapentin 600 mg TID. Medical marijuana. Lumbar radiculopathy and impaired ambulatory dysfunction secondary to pain. Has bowel regimen and is having bowel movements daily. Patient required additional pain management during previous cycle and has some additional pain from tunneled line placement    Plan:  Continue gabapentin 300 mg TID, oxycodone 5 mg TID, prn Tylenol 650 mg q6.     Oxycodone 5mg every 8 hours  Oxycodone 2.5 mg q6 PRN  Patient will be discharged on short course of pain meds, but will follow-up with PCP for further management. Benign essential hypertension  Assessment & Plan  Home regimen: Coreg 12.5 mg BID, Amlodipine 10 mg daily, and lisinopril 40 mg. Noted to become hypotensive after undergoing chemo cycles. Discontinued Coreg given chemo tx. Plan:  Monitor vitals. Continue Norvasc 10 and lopressor 25 bid. Will discontinue coreg upon discharge. Disposition: Stepdown Level 1    ICU Core Measures     Vented Patient  VAP Bundle  VAP bundle ordered     A: Assess, Prevent, and Manage Pain Has pain been assessed? Yes  Need for changes to pain regimen? No   B: Both Spontaneous Awakening Trials (SATs) and Spontaneous Breathing Trials (SBTs) Plan to perform spontaneous awakening trial today? N/A   Plan to perform spontaneous breathing trial today? N/A   Obvious barriers to extubation? NA   C: Choice of Sedation RASS Goal: 0 Alert and Calm or N/A patient not on sedation  Need for changes to sedation or analgesia regimen? No   D: Delirium CAM-ICU: Negative   E: Early Mobility  Plan for early mobility? Yes   F: Family Engagement Plan for family engagement today? Yes       Antibiotic Review: Patient is on prophylaxis given recent chemo tx. Will maintain per Heme Onc protocol w/ Bactrim, Acyclovir, and levofloxacin. Review of Invasive Devices:      Central access plan: Will remove central and peripheral access prior to discharge on Monday, 12/4. Prophylaxis:  VTE VTE covered by:  apixaban, Oral, 5 mg at 12/01/23 2133       Stress Ulcer  covered byfamotidine (PEPCID) tablet 20 mg [[id number]]         Significant 24hr Events     24hr events:   Utilized home vent and tolerated well overnight. No issues per respiratory. Noted to have lower BP, but this is consistent with prior post-chemo responses. Yesterday, noted to have another brief episode of non-sustained Vtach w/ STAT BMP showing hypokalemia 3.3. Patient denies any complaints, but mood is visibly more depressed.       Subjective Review of Systems   Constitutional:  Positive for fatigue. Respiratory:  Negative for cough and shortness of breath. Cardiovascular:  Negative for chest pain. Gastrointestinal:  Negative for abdominal pain, nausea and vomiting. Genitourinary:  Negative for dysuria. Neurological:  Negative for headaches. Objective                            Vitals I/O      Most Recent Min/Max in 24hrs   Temp 98.2 °F (36.8 °C) Temp  Min: 98.2 °F (36.8 °C)  Max: 100.3 °F (37.9 °C)   Pulse 94 Pulse  Min: 88  Max: 109   Resp 17 Resp  Min: 8  Max: 32   /56 BP  Min: 74/51  Max: 109/58   O2 Sat 94 % SpO2  Min: 82 %  Max: 99 %      Intake/Output Summary (Last 24 hours) at 12/2/2023 0737  Last data filed at 12/1/2023 2322  Gross per 24 hour   Intake 1250 ml   Output 675 ml   Net 575 ml       Diet Regular; Regular House    Invasive Monitoring   N/A        Physical Exam   Physical Exam  Vitals and nursing note reviewed. Skin:     General: Skin is dry. Constitutional:       General: She is not in acute distress. Appearance: She is ill-appearing. Patient declined physical exam today. Per my visual exam, does not appear to be in distress however mood is more depressed. Diagnostic Studies      EKG: No new EKG obtained for review. Imaging: No new imaging obtained for review. I have personally reviewed pertinent reports.        Medications:  Scheduled PRN   acyclovir, 400 mg, BID  allopurinol, 200 mg, 4x Daily  amLODIPine, 10 mg, Daily  apixaban, 5 mg, BID  budesonide, 0.5 mg, Q12H  busPIRone, 30 mg, TID  chlorhexidine, 15 mL, Q12H ASHLEY  famotidine, 20 mg, BID  Filgrastim-aafi, 300 mcg, Daily  fluconazole, 001 mg, Daily  folic acid, 1 mg, Daily  formoterol, 20 mcg, Q12H  gabapentin, 300 mg, TID  levalbuterol, 1.25 mg, Q6H   And  ipratropium, 0.5 mg, Q6H  levalbuterol, 1.25 mg, Once  levofloxacin, 250 mg, Q24H [address]  melatonin, 3 mg, HS  metoprolol tartrate, 25 mg, Q12H [address]  nicotine, 7 mg, Daily  oxyCODONE, 5 mg, Q8H  polyethylene glycol, 17 g, Daily  potassium chloride, 20 mEq, Once  QUEtiapine, 50 mg, HS  senna, 17.6 mg, BID  sertraline, 75 mg, Daily  sulfamethoxazole-trimethoprim, 1 tablet, Once per day on Mon Wed Fri      alteplase, 2 mg, Q1MIN PRN  alteplase, 2 mg, Q1MIN PRN  alteplase, 2 mg, Q1MIN PRN  alteplase, 2 mg, Q1MIN PRN  alteplase, 2 mg, Q1MIN PRN  levalbuterol, 1.25 mg, Q4H PRN  ondansetron, 4 mg, Q8H PRN  ondansetron, 4 mg, Q8H PRN  oxyCODONE, 2.5 mg, Q6H PRN       Continuous          Labs:    CBC    Recent Labs     12/01/23  0510 12/01/23  1310   WBC 22.36*  --    HGB 8.5* 9.3*   HCT 25.9*  --      --    BANDSPCT 7  --      BMP    Recent Labs     12/01/23  1310 12/01/23  1804   SODIUM 136 138   K 3.2* 3.3*   CL 99 99   CO2 29 30   AGAP 8 9   BUN 34* 38*   CREATININE 1.31* 1.33*   CALCIUM 9.5 9.5       Coags    No recent results     Additional Electrolytes  Recent Labs     12/01/23  1804   MG 1.9   PHOS 3.4   CAIONIZED 1.19          Blood Gas    No recent results  No recent results LFTs  No recent results    Infectious  No recent results  Glucose  Recent Labs     11/30/23  0749 12/01/23  0710 12/01/23  1310 12/01/23  1804   GLUC 100 105 116 116               Critical Care Time Delivered : Upon my evaluation, this patient had a high probability of imminent or life-threatening deterioration due to acute respiratory failure w/ hypoxia, which required my direct attention, intervention, and personal management. I have personally provided 40 minutes of critical care time, exclusive of procedures, teaching, family meetings, and any prior time recorded by providers other than myself.      Ady Grandchild, DO

## 2023-12-02 NOTE — RESPIRATORY THERAPY NOTE
12/02/23 1301   Respiratory Assessment   Resp Comments Family education reveiwed with Son and Jesus Jones who will be the primary caregiver at night since she is a nurse. Reveiwed Oxygen both home and travel,  Sx, Trachcare, Inner Cannula changed, Inflate and deflate cuff, PMV valve, and when to use vent and when to come off.    Oxygen Therapy/Pulse Ox   O2 Device Trach mask   O2 Therapy Oxygen humidified   FiO2 (%) 50  (Recieved on)   O2 Flow Rate (L/min) (S)  12 L/min   SpO2 90 %   SpO2 Activity At Rest   $ Pulse Oximetry Spot Check Charge Completed

## 2023-12-02 NOTE — PLAN OF CARE
Problem: MOBILITY - ADULT  Goal: Maintain or return to baseline ADL function  Description: INTERVENTIONS:  -  Assess patient's ability to carry out ADLs; assess patient's baseline for ADL function and identify physical deficits which impact ability to perform ADLs (bathing, care of mouth/teeth, toileting, grooming, dressing, etc.)  - Assess/evaluate cause of self-care deficits   - Assess range of motion  - Assess patient's mobility; develop plan if impaired  - Assess patient's need for assistive devices and provide as appropriate  - Encourage maximum independence but intervene and supervise when necessary  - Involve family in performance of ADLs  - Assess for home care needs following discharge   - Consider OT consult to assist with ADL evaluation and planning for discharge  - Provide patient education as appropriate  Outcome: Progressing  Goal: Maintains/Returns to pre admission functional level  Description: INTERVENTIONS:  - Perform BMAT or MOVE assessment daily.   - Set and communicate daily mobility goal to care team and patient/family/caregiver. - Collaborate with rehabilitation services on mobility goals if consulted  - Perform Range of Motion  times a day. - Reposition patient every  hours.   - Dangle patient  times a day  - Stand patient  times a day  - Ambulate patient  times a day  - Out of bed to chair  times a day   - Out of bed for meals  times a day  - Out of bed for toileting  - Record patient progress and toleration of activity level   Outcome: Progressing     Problem: Prexisting or High Potential for Compromised Skin Integrity  Goal: Skin integrity is maintained or improved  Description: INTERVENTIONS:  - Identify patients at risk for skin breakdown  - Assess and monitor skin integrity  - Assess and monitor nutrition and hydration status  - Monitor labs   - Assess for incontinence   - Turn and reposition patient  - Assist with mobility/ambulation  - Relieve pressure over bony prominences  - Avoid friction and shearing  - Provide appropriate hygiene as needed including keeping skin clean and dry  - Evaluate need for skin moisturizer/barrier cream  - Collaborate with interdisciplinary team   - Patient/family teaching  - Consider wound care consult   Outcome: Progressing     Problem: Nutrition/Hydration-ADULT  Goal: Nutrient/Hydration intake appropriate for improving, restoring or maintaining nutritional needs  Description: Monitor and assess patient's nutrition/hydration status for malnutrition. Collaborate with interdisciplinary team and initiate plan and interventions as ordered. Monitor patient's weight and dietary intake as ordered or per policy. Utilize nutrition screening tool and intervene as necessary. Determine patient's food preferences and provide high-protein, high-caloric foods as appropriate.      INTERVENTIONS:  - Monitor oral intake, urinary output, labs, and treatment plans  - Assess nutrition and hydration status and recommend course of action  - Evaluate amount of meals eaten  - Assist patient with eating if necessary   - Allow adequate time for meals  - Recommend/ encourage appropriate diets, oral nutritional supplements, and vitamin/mineral supplements  - Order, calculate, and assess calorie counts as needed  - Recommend, monitor, and adjust tube feedings and TPN/PPN based on assessed needs  - Assess need for intravenous fluids  - Provide specific nutrition/hydration education as appropriate  - Include patient/family/caregiver in decisions related to nutrition  Outcome: Progressing     Problem: PAIN - ADULT  Goal: Verbalizes/displays adequate comfort level or baseline comfort level  Description: Interventions:  - Encourage patient to monitor pain and request assistance  - Assess pain using appropriate pain scale  - Administer analgesics based on type and severity of pain and evaluate response  - Implement non-pharmacological measures as appropriate and evaluate response  - Consider cultural and social influences on pain and pain management  - Notify physician/advanced practitioner if interventions unsuccessful or patient reports new pain  Outcome: Progressing     Problem: INFECTION - ADULT  Goal: Absence or prevention of progression during hospitalization  Description: INTERVENTIONS:  - Assess and monitor for signs and symptoms of infection  - Monitor lab/diagnostic results  - Monitor all insertion sites, i.e. indwelling lines, tubes, and drains  - Monitor endotracheal if appropriate and nasal secretions for changes in amount and color  - Jerome appropriate cooling/warming therapies per order  - Administer medications as ordered  - Instruct and encourage patient and family to use good hand hygiene technique  - Identify and instruct in appropriate isolation precautions for identified infection/condition  Outcome: Progressing  Goal: Absence of fever/infection during neutropenic period  Description: INTERVENTIONS:  - Monitor WBC    Outcome: Progressing     Problem: SAFETY ADULT  Goal: Maintain or return to baseline ADL function  Description: INTERVENTIONS:  -  Assess patient's ability to carry out ADLs; assess patient's baseline for ADL function and identify physical deficits which impact ability to perform ADLs (bathing, care of mouth/teeth, toileting, grooming, dressing, etc.)  - Assess/evaluate cause of self-care deficits   - Assess range of motion  - Assess patient's mobility; develop plan if impaired  - Assess patient's need for assistive devices and provide as appropriate  - Encourage maximum independence but intervene and supervise when necessary  - Involve family in performance of ADLs  - Assess for home care needs following discharge   - Consider OT consult to assist with ADL evaluation and planning for discharge  - Provide patient education as appropriate  Outcome: Progressing  Goal: Maintains/Returns to pre admission functional level  Description: INTERVENTIONS:  - Perform BMAT or MOVE assessment daily.   - Set and communicate daily mobility goal to care team and patient/family/caregiver. - Collaborate with rehabilitation services on mobility goals if consulted  - Perform Range of Motion  times a day. - Reposition patient every  hours.   - Dangle patient  times a day  - Stand patient  times a day  - Ambulate patient  times a day  - Out of bed to chair  times a day   - Out of bed for meals  times a day  - Out of bed for toileting  - Record patient progress and toleration of activity level   Outcome: Progressing  Goal: Patient will remain free of falls  Description: INTERVENTIONS:  - Educate patient/family on patient safety including physical limitations  - Instruct patient to call for assistance with activity   - Consult OT/PT to assist with strengthening/mobility   - Keep Call bell within reach  - Keep bed low and locked with side rails adjusted as appropriate  - Keep care items and personal belongings within reach  - Initiate and maintain comfort rounds  - Make Fall Risk Sign visible to staff  - Offer Toileting every  Hours, in advance of need  - Initiate/Maintain alarm  - Obtain necessary fall risk management equipment:   - Apply yellow socks and bracelet for high fall risk patients  - Consider moving patient to room near nurses station  Outcome: Progressing     Problem: DISCHARGE PLANNING  Goal: Discharge to home or other facility with appropriate resources  Description: INTERVENTIONS:  - Identify barriers to discharge w/patient and caregiver  - Arrange for needed discharge resources and transportation as appropriate  - Identify discharge learning needs (meds, wound care, etc.)  - Arrange for interpretive services to assist at discharge as needed  - Refer to Case Management Department for coordinating discharge planning if the patient needs post-hospital services based on physician/advanced practitioner order or complex needs related to functional status, cognitive ability, or social support system  Outcome: Progressing     Problem: Knowledge Deficit  Goal: Patient/family/caregiver demonstrates understanding of disease process, treatment plan, medications, and discharge instructions  Description: Complete learning assessment and assess knowledge base.   Interventions:  - Provide teaching at level of understanding  - Provide teaching via preferred learning methods  Outcome: Progressing     Problem: RESPIRATORY - ADULT  Goal: Achieves optimal ventilation and oxygenation  Description: INTERVENTIONS:  - Assess for changes in respiratory status  - Assess for changes in mentation and behavior  - Position to facilitate oxygenation and minimize respiratory effort  - Oxygen administered by appropriate delivery if ordered  - Initiate smoking cessation education as indicated  - Encourage broncho-pulmonary hygiene including cough, deep breathe, Incentive Spirometry  - Assess the need for suctioning and aspirate as needed  - Assess and instruct to report SOB or any respiratory difficulty  - Respiratory Therapy support as indicated  Outcome: Progressing     Problem: GENITOURINARY - ADULT  Goal: Maintains or returns to baseline urinary function  Description: INTERVENTIONS:  - Assess urinary function  - Encourage oral fluids to ensure adequate hydration if ordered  - Administer IV fluids as ordered to ensure adequate hydration  - Administer ordered medications as needed  - Offer frequent toileting  - Follow urinary retention protocol if ordered  Outcome: Progressing  Goal: Absence of urinary retention  Description: INTERVENTIONS:  - Assess patient’s ability to void and empty bladder  - Monitor I/O  - Bladder scan as needed  - Discuss with physician/AP medications to alleviate retention as needed  - Discuss catheterization for long term situations as appropriate  Outcome: Progressing     Problem: METABOLIC, FLUID AND ELECTROLYTES - ADULT  Goal: Electrolytes maintained within normal limits  Description: INTERVENTIONS:  - Monitor labs and assess patient for signs and symptoms of electrolyte imbalances  - Administer electrolyte replacement as ordered  - Monitor response to electrolyte replacements, including repeat lab results as appropriate  - Instruct patient on fluid and nutrition as appropriate  Outcome: Progressing  Goal: Fluid balance maintained  Description: INTERVENTIONS:  - Monitor labs   - Monitor I/O and WT  - Instruct patient on fluid and nutrition as appropriate  - Assess for signs & symptoms of volume excess or deficit  Outcome: Progressing  Goal: Glucose maintained within target range  Description: INTERVENTIONS:  - Monitor Blood Glucose as ordered  - Assess for signs and symptoms of hyperglycemia and hypoglycemia  - Administer ordered medications to maintain glucose within target range  - Assess nutritional intake and initiate nutrition service referral as needed  Outcome: Progressing

## 2023-12-03 LAB
ANION GAP SERPL CALCULATED.3IONS-SCNC: 4 MMOL/L
BUN SERPL-MCNC: 26 MG/DL (ref 5–25)
CALCIUM SERPL-MCNC: 9.5 MG/DL (ref 8.4–10.2)
CHLORIDE SERPL-SCNC: 105 MMOL/L (ref 96–108)
CO2 SERPL-SCNC: 30 MMOL/L (ref 21–32)
CREAT SERPL-MCNC: 1.05 MG/DL (ref 0.6–1.3)
ERYTHROCYTE [DISTWIDTH] IN BLOOD BY AUTOMATED COUNT: 15.8 % (ref 11.6–15.1)
GFR SERPL CREATININE-BSD FRML MDRD: 53 ML/MIN/1.73SQ M
GLUCOSE SERPL-MCNC: 111 MG/DL (ref 65–140)
HCT VFR BLD AUTO: 24.1 % (ref 34.8–46.1)
HGB BLD-MCNC: 7.4 G/DL (ref 11.5–15.4)
MCH RBC QN AUTO: 29.1 PG (ref 26.8–34.3)
MCHC RBC AUTO-ENTMCNC: 30.7 G/DL (ref 31.4–37.4)
MCV RBC AUTO: 95 FL (ref 82–98)
PLATELET # BLD AUTO: 145 THOUSANDS/UL (ref 149–390)
PMV BLD AUTO: 10.2 FL (ref 8.9–12.7)
POTASSIUM SERPL-SCNC: 4.4 MMOL/L (ref 3.5–5.3)
RBC # BLD AUTO: 2.54 MILLION/UL (ref 3.81–5.12)
SODIUM SERPL-SCNC: 139 MMOL/L (ref 135–147)
WBC # BLD AUTO: 0.98 THOUSAND/UL (ref 4.31–10.16)

## 2023-12-03 PROCEDURE — 94760 N-INVAS EAR/PLS OXIMETRY 1: CPT

## 2023-12-03 PROCEDURE — 99232 SBSQ HOSP IP/OBS MODERATE 35: CPT | Performed by: INTERNAL MEDICINE

## 2023-12-03 PROCEDURE — 85027 COMPLETE CBC AUTOMATED: CPT

## 2023-12-03 PROCEDURE — 94640 AIRWAY INHALATION TREATMENT: CPT

## 2023-12-03 PROCEDURE — 80048 BASIC METABOLIC PNL TOTAL CA: CPT

## 2023-12-03 RX ADMIN — APIXABAN 5 MG: 5 TABLET, FILM COATED ORAL at 08:42

## 2023-12-03 RX ADMIN — SERTRALINE HYDROCHLORIDE 100 MG: 50 TABLET ORAL at 08:42

## 2023-12-03 RX ADMIN — FAMOTIDINE 20 MG: 20 TABLET, FILM COATED ORAL at 18:44

## 2023-12-03 RX ADMIN — FORMOTEROL FUMARATE DIHYDRATE 20 MCG: 20 SOLUTION RESPIRATORY (INHALATION) at 19:44

## 2023-12-03 RX ADMIN — FORMOTEROL FUMARATE DIHYDRATE 20 MCG: 20 SOLUTION RESPIRATORY (INHALATION) at 08:05

## 2023-12-03 RX ADMIN — APIXABAN 5 MG: 5 TABLET, FILM COATED ORAL at 20:29

## 2023-12-03 RX ADMIN — NICOTINE 7 MG: 7 PATCH, EXTENDED RELEASE TRANSDERMAL at 08:58

## 2023-12-03 RX ADMIN — IPRATROPIUM BROMIDE 0.5 MG: 0.5 SOLUTION RESPIRATORY (INHALATION) at 07:50

## 2023-12-03 RX ADMIN — LEVALBUTEROL HYDROCHLORIDE 1.25 MG: 1.25 SOLUTION RESPIRATORY (INHALATION) at 14:03

## 2023-12-03 RX ADMIN — GABAPENTIN 300 MG: 300 CAPSULE ORAL at 16:28

## 2023-12-03 RX ADMIN — ALLOPURINOL 200 MG: 100 TABLET ORAL at 21:32

## 2023-12-03 RX ADMIN — LEVALBUTEROL HYDROCHLORIDE 1.25 MG: 1.25 SOLUTION RESPIRATORY (INHALATION) at 07:50

## 2023-12-03 RX ADMIN — BUDESONIDE 0.5 MG: 0.5 INHALANT ORAL at 08:06

## 2023-12-03 RX ADMIN — IPRATROPIUM BROMIDE 0.5 MG: 0.5 SOLUTION RESPIRATORY (INHALATION) at 19:43

## 2023-12-03 RX ADMIN — QUETIAPINE FUMARATE 50 MG: 25 TABLET ORAL at 21:32

## 2023-12-03 RX ADMIN — BUDESONIDE 0.5 MG: 0.5 INHALANT ORAL at 19:44

## 2023-12-03 RX ADMIN — LEVALBUTEROL HYDROCHLORIDE 1.25 MG: 1.25 SOLUTION RESPIRATORY (INHALATION) at 02:02

## 2023-12-03 RX ADMIN — FLUCONAZOLE 200 MG: 100 TABLET ORAL at 08:42

## 2023-12-03 RX ADMIN — METOPROLOL TARTRATE 25 MG: 25 TABLET, FILM COATED ORAL at 20:29

## 2023-12-03 RX ADMIN — FILGRASTIM-AAFI 300 MCG: 300 INJECTION, SOLUTION SUBCUTANEOUS at 10:34

## 2023-12-03 RX ADMIN — GABAPENTIN 300 MG: 300 CAPSULE ORAL at 08:42

## 2023-12-03 RX ADMIN — GABAPENTIN 300 MG: 300 CAPSULE ORAL at 20:29

## 2023-12-03 RX ADMIN — LEVOFLOXACIN 250 MG: 250 TABLET, FILM COATED ORAL at 08:42

## 2023-12-03 RX ADMIN — MELATONIN 3 MG: at 21:32

## 2023-12-03 RX ADMIN — ACYCLOVIR 400 MG: 200 CAPSULE ORAL at 18:44

## 2023-12-03 RX ADMIN — ALLOPURINOL 200 MG: 100 TABLET ORAL at 18:44

## 2023-12-03 RX ADMIN — CHLORHEXIDINE GLUCONATE 15 ML: 1.2 SOLUTION ORAL at 10:33

## 2023-12-03 RX ADMIN — FOLIC ACID 1 MG: 1 TABLET ORAL at 08:42

## 2023-12-03 RX ADMIN — OXYCODONE HYDROCHLORIDE 5 MG: 5 SOLUTION ORAL at 12:26

## 2023-12-03 RX ADMIN — BUSPIRONE HYDROCHLORIDE 30 MG: 10 TABLET ORAL at 08:43

## 2023-12-03 RX ADMIN — IPRATROPIUM BROMIDE 0.5 MG: 0.5 SOLUTION RESPIRATORY (INHALATION) at 14:03

## 2023-12-03 RX ADMIN — ACYCLOVIR 400 MG: 200 CAPSULE ORAL at 08:47

## 2023-12-03 RX ADMIN — OXYCODONE HYDROCHLORIDE 5 MG: 5 SOLUTION ORAL at 05:21

## 2023-12-03 RX ADMIN — ALLOPURINOL 200 MG: 100 TABLET ORAL at 08:42

## 2023-12-03 RX ADMIN — OXYCODONE HYDROCHLORIDE 5 MG: 5 SOLUTION ORAL at 20:29

## 2023-12-03 RX ADMIN — ALLOPURINOL 200 MG: 100 TABLET ORAL at 12:26

## 2023-12-03 RX ADMIN — BUSPIRONE HYDROCHLORIDE 30 MG: 10 TABLET ORAL at 16:28

## 2023-12-03 RX ADMIN — BUSPIRONE HYDROCHLORIDE 30 MG: 10 TABLET ORAL at 21:32

## 2023-12-03 RX ADMIN — IPRATROPIUM BROMIDE 0.5 MG: 0.5 SOLUTION RESPIRATORY (INHALATION) at 02:02

## 2023-12-03 RX ADMIN — FAMOTIDINE 20 MG: 20 TABLET, FILM COATED ORAL at 08:42

## 2023-12-03 RX ADMIN — LEVALBUTEROL HYDROCHLORIDE 1.25 MG: 1.25 SOLUTION RESPIRATORY (INHALATION) at 19:43

## 2023-12-03 NOTE — ASSESSMENT & PLAN NOTE
Wt Readings from Last 3 Encounters:   12/02/23 77.1 kg (169 lb 15.6 oz)   09/07/23 74.6 kg (164 lb 6.4 oz)   08/30/23 73.3 kg (161 lb 9.6 oz)     Lab Results   Component Value Date    LVEF 65 11/21/2023     (H) 10/28/2023     (H) 09/29/2023     Echo 11/21/23: LVEF 65%.        Plan:  Maintain K > 4 and Mg > 2  Measure I/O

## 2023-12-03 NOTE — PROGRESS NOTES
[facility]  Progress Note  Name: Cat Godinez  MRN: [id number]  Unit/Bed#: ICU 03 I Date of Admission: 9/13/2023   Date of Service: 12/3/2023 I Hospital Day: 80    Assessment/Plan   * Acute respiratory failure with hypoxia secondary to oropharyngeal mass  Assessment & Plan  Cuffless trach placed on 9/17. Transitioned to cuffed trach on 10/3. Respiratory breathing has been challenging in the setting of anxiety given recent events. Repeat imaging does not show improvement in bilateral consolidation or atelectasis and groundglass opacities on repeat. S/p bronch. Bronchial cx with 3 colonies CoNS. Completed 7 day course of IV abx w/ cefepime and Vancomycin. Bronchial culture and gram stain negative. Micro negative for CMV, AFB, Fungitell, Pneumocystis jiroveci (carinii), Histoplasma, Aspergillus. Trach secretions sent for sputum culture shows 2+ polys, 1+ gram-positive cocci in pairs, chains and clusters and 1+ gram-negative rods. Cuffless trach was placed 9/17. Replaced with a cuffed Shiley XLT #7 10/3. Etiology: Drug induced pneumonitis in the setting of chemo 2/2 newly dx lymphoma. CT chest showed Infiltrates in the lingula and left lower lobe. Improved from prior study. Received home vent yesterday and tolerated it overnight. No changes made to vent. Plan:  Continue w/ PS 12/8 40% with cuff up overnight. During day on 10L w/ FiO2 40% with cuff down. Goal setting 4/8 at FiO2 30%. Continue to titrate O2 to maintain SpO2 >85%  Aggressive pulmonary toilet   Incentive Spirometry   Suctioning via trach if patient is desatting and unable to expectorate phlegm. Continue w/ guaifenesin, Atrovent and Xopenex for airway maintenance. Continue inhalation treatment with Pulmicort and formoterol q12 , duonebs QID, atrovent and xopenex   Regular diet  Daily PT/OT    Large cell lymphoma (HCC)  Assessment & Plan  Large B-cell lymphoma, stage II.  First chemo cycle 9/22 4 days of R-EPOCH. 9/27 Rituxan. 9/28 Filgrastim. Received nivestym 300 mc 7 days dcd on 10/26   Discontinue Prophylaxis as per Heme Oncology recommendations  Pt had hyperkalemia of 5.5, can be due to Bactrim, consider switching to atovaquone      9/22  4 days of R-EPOCH.  9/27 Rituxan  10/13 for [facility] cycle 2, which was done over 4 days and cytoxan on 10/19.  11/6 completed R-EPOCH cycle 3 and Cytoxan 11/7. Completed 4th cycle of chemotherapy from 11/24-11/28. Plan:  Continue Bactrim, Acyclovir, Allopurinol for prophylaxis   Transfuse blood products as needed. Keep Hgb>7 and Plt>20 for chemo   See acute respiratory failure above    Acute embolism and thrombosis of right internal jugular vein (HCC)  Assessment & Plan  CT soft tissues of the neck: Nonocclusive thrombus noted within the right internal jugular vein just above the Mediport entrance site into the internal jugular vein. No signs of pain, headaches, vision changes. Plan:  Continue w/ home Eliquis 5 mg     Oropharyngeal mass  Assessment & Plan  9/14 Neck/ soft tissue CT: Large enhancing soft tissue mass identified within the left neck involving multiple spaces. Centered within the left oropharynx with extensions as described above into multiple spaces. This results in significant airway compromise. 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 Small level 3/level 4 nodes are seen within the neck. Tracheostomy by ENT. Replaced on 9/26. H/o tobacco abuse, daily smoker. On nicotine while inpatient, confirmed with patient she needs nicotine patch. CT soft tissue neck shows reduced mass effect from 9/14 to 10/13. Can consider reducing trach size if continues to improve. Patient often refusing peg tube feeds but is feeding herself small meals orally. ENT and heme onc following  Pt received her 4th cycle of chemotherapy     Plan:  As per speech therapist diet  Dysphagia 2. Pt no longer on PEG tube feeds for nutrition.    See acute respiratory failure and large B cell lymphoma above. Pancytopenia due to chemotherapy [facility])  Assessment & Plan  Recent Labs     12/01/23  0510 12/01/23  1310 12/02/23  1115   HGB 8.5* 9.3* 7.3*       '  Patient received 1 PRBC transfusion on 10/5 and 10/25. Her B/L hemoglobin is 12-14. No sites of bleeding, no black stools. Iron panel indicative of inflammatory anemia. Normal Vitamin B12 levels. Folate low at 5.5. Filgrastim 4 doses administered by heme/onc. Hb declined from 9.9 to 7.4, pt received 1 PRBC transfusion (11/24)    Plan:  Trend hemoglobin and transfuse for <7. As per heme/onc transfuse irradiated and leukoreduced blood products. Trend platelets  Folic acid 1mg daily supplementation  As per conversation with Heme/oncology attending, patient pancytopenia associated with chemotherapy is expected/predictable. No further anemia w/u required. Low ANC management as per heme/onc    Blister (nonthermal) of left forearm, sequela  Assessment & Plan  Blisters of left upper extremity healing, no signs of infection, continue daily wound care. (HFpEF) heart failure with preserved ejection fraction [facility])  Assessment & Plan  Wt Readings from Last 3 Encounters:   12/02/23 77.1 kg (169 lb 15.6 oz)   09/07/23 74.6 kg (164 lb 6.4 oz)   08/30/23 73.3 kg (161 lb 9.6 oz)     Lab Results   Component Value Date    LVEF 65 11/21/2023     (H) 10/28/2023     (H) 09/29/2023     Echo 11/21/23: LVEF 65%. Plan:  Maintain K > 4 and Mg > 2  Measure I/O    Ambulatory dysfunction  Assessment & Plan  2/2 Impacted by chronic pain syndrome + prolonged hospital stay. Continue OOB with PT. Depression  Assessment & Plan  Patient on Zoloft 75 daily and Seroquel hs   Had multiple family/palliative discussions. Currently goal is disease treatment oriented. Full code. No SI/HI ideations. Palliative signed off, patient declines intervention and will follow-up outpatient.   Given decline in mood, will consider increasing Sertraline to 100mg daily. Chronic Superficial thrombophlebitis  Assessment & Plan  Right forearm soreness. RUE US: No acute or chronic deep vein thrombosis. 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 PO not severe enough to require renal dosing at this time, will continue to monitor and adjust dosing as needed. Given that patient is already on Eliquis, this will serve as VTE ppx. CKD (chronic kidney disease) stage 3, GFR 30-59 ml/min [facility])  Assessment & Plan  Lab Results   Component Value Date    EGFR 42 12/02/2023    EGFR 40 12/01/2023    EGFR 41 12/01/2023    CREATININE 1.28 12/02/2023    CREATININE 1.33 (H) 12/01/2023    CREATININE 1.31 (H) 12/01/2023     Baseline Cr between 0.75-1.1  Initial PO felt 2/2 prerenal azotemia 2/2 diuresis, reduced PO intake +/- ATN. Patient received 2 doses of Bumex IV 2 g as she had not been responding appropriately to IV 40 Lasix daily. Creatinine went up by 0.5 meeting criteria for an PO. Suspect most likely due to medications as a combination of prerenal and intrinsic. FENa was 0.2%, UA shows no casts or signs of infection, urine eosinophils 0%. Patient likely has prerenal PO from volume depletion. Received 2 L NS and 1 pRBC and cr went up by 0.07 still and Na and Cl went down, suspect that volume prevented further cr rise and free water excretion impaired by kidney function, allowing hyponatremia to increase. Suspect that now that nephrotoxic medications have been stopped she responded well to Lasix and creatinine downtrended. PO now resolved. Will be cautious about nephrotoxins and monitor as restarting EPOCH and ppx. Patient gets volume depleted and overloaded very easily. Especially from chemo. Plan:   Continue to monitor UO/renal function. Avoid nephrotoxic agents    Pain syndrome, chronic  Assessment & Plan  Home regimen: Oxycodone 5 mg BID, Tylenol 650 mg q8 prn. Gabapentin 600 mg TID. Medical marijuana.    Lumbar radiculopathy and impaired ambulatory dysfunction secondary to pain. Has bowel regimen and is having bowel movements daily. Patient required additional pain management during previous cycle and has some additional pain from tunneled line placement    Plan:  Continue gabapentin 300 mg TID, oxycodone 5 mg TID, prn Tylenol 650 mg q6. Oxycodone 5mg every 8 hours  Oxycodone 2.5 mg q6 PRN  Patient will be discharged on short course of pain meds, but will follow-up with PCP for further management. Benign essential hypertension  Assessment & Plan  Home regimen: Coreg 12.5 mg BID, Amlodipine 10 mg daily, and lisinopril 40 mg. Noted to become hypotensive after undergoing chemo cycles. Discontinued Coreg given chemo tx. Plan:  Monitor vitals. Continue Norvasc 10 and lopressor 25 bid. Will discontinue coreg upon discharge. Protein-calorie malnutrition ([address])  Assessment & Plan  Malnutrition Findings:   Adult Malnutrition type: Chronic illness  Adult Degree of Malnutrition: Unspecified protein calorie malnutrition  Malnutrition Characteristics: Inadequate energy, Other (comment) (stress of illness)                  360 Statement: Protein calorie malnutrition r/t inadequate intake as evidenced by >7 day period of poor intakes and stress of infection; currently treated with regular diet and nutrition supplements    BMI Findings: Body mass index is 33.2 kg/m². Disposition: Stepdown Level 1    ICU Core Measures     Vented Patient  VAP Bundle  VAP bundle ordered     A: Assess, Prevent, and Manage Pain Has pain been assessed? Yes  Need for changes to pain regimen? No   B: Both Spontaneous Awakening Trials (SATs) and Spontaneous Breathing Trials (SBTs) Plan to perform spontaneous awakening trial today? N/A   Plan to perform spontaneous breathing trial today? N/A   Obvious barriers to extubation? NA   C: Choice of Sedation RASS Goal: 0 Alert and Calm  Need for changes to sedation or analgesia regimen?  No   D: Delirium CAM-ICU: Negative   E: Early Mobility  Plan for early mobility? NA   F: Family Engagement Plan for family engagement today? NA       Antibiotic Review: Patient on appropriate coverage based on culture data. Review of Invasive Devices:      Central access plan: Medications requiring central line      Prophylaxis:  VTE VTE covered by:  apixaban, Oral, 5 mg at 12/02/23 2107       Stress Ulcer  covered byfamotidine (PEPCID) tablet 20 mg [[id number]]         Significant 24hr Events     24hr events: No acute events overnight      Subjective   Review of Systems   Constitutional:  Negative for chills, fatigue and fever. HENT:  Positive for voice change. Respiratory:  Negative for cough, chest tightness and shortness of breath. Cardiovascular:  Negative for chest pain, palpitations and leg swelling. Gastrointestinal:  Negative for abdominal pain, diarrhea, nausea and vomiting. Endocrine: Negative. Genitourinary: Negative. Neurological: Negative. Negative for dizziness, light-headedness and headaches. Hematological: Negative. Psychiatric/Behavioral: Negative. All other systems reviewed and are negative. Objective                            Vitals I/O      Most Recent Min/Max in 24hrs   Temp 99 °F (37.2 °C) Temp  Min: 98.2 °F (36.8 °C)  Max: 99.4 °F (37.4 °C)   Pulse 85 Pulse  Min: 77  Max: 106   Resp 16 Resp  Min: 14  Max: 25   BP 96/59 BP  Min: 91/55  Max: 146/72   O2 Sat 97 % SpO2  Min: 90 %  Max: 100 %      Intake/Output Summary (Last 24 hours) at 12/3/2023 6734  Last data filed at 12/3/2023 0708  Gross per 24 hour   Intake --   Output 450 ml   Net -450 ml       Diet Regular; Regular House    Invasive Monitoring           Physical Exam   Physical Exam  Eyes:      General: Vision grossly intact. Extraocular Movements: Extraocular movements intact. Conjunctiva/sclera: Conjunctivae normal.      Pupils: Pupils are equal, round, and reactive to light.    Skin:     General: Skin is warm. HENT:      Head: Normocephalic and atraumatic. Right Ear: Hearing normal.      Left Ear: Hearing normal.      Mouth/Throat:      Mouth: Mucous membranes are moist.   Neck:      Trachea: Tracheostomy present. Comments: tunneled DL RIJ catheter w/o purulence  Cardiovascular:      Rate and Rhythm: Normal rate and regular rhythm. Pulses: Normal pulses. Heart sounds: Normal heart sounds. Abdominal: General: Bowel sounds are normal.   Constitutional:       Appearance: She is ill-appearing. Pulmonary:      Effort: No respiratory distress. Breath sounds: Rhonchi present. Chest:      Chest wall: No tenderness. Neurological:      Mental Status: She is alert and oriented to person, place and time.             Diagnostic Studies      Imaging: No imaging done in the past 24 hours      Medications:  Scheduled PRN   acyclovir, 400 mg, BID  allopurinol, 200 mg, 4x Daily  amLODIPine, 10 mg, Daily  apixaban, 5 mg, BID  budesonide, 0.5 mg, Q12H  busPIRone, 30 mg, TID  chlorhexidine, 15 mL, Q12H ASHLEY  famotidine, 20 mg, BID  Filgrastim-aafi, 300 mcg, Daily  fluconazole, 394 mg, Daily  folic acid, 1 mg, Daily  formoterol, 20 mcg, Q12H  gabapentin, 300 mg, TID  levalbuterol, 1.25 mg, Q6H   And  ipratropium, 0.5 mg, Q6H  levalbuterol, 1.25 mg, Once  levofloxacin, 250 mg, Q24H ASHLEY  melatonin, 3 mg, HS  metoprolol tartrate, 25 mg, Q12H ASHLEY  nicotine, 7 mg, Daily  oxyCODONE, 5 mg, Q8H  polyethylene glycol, 17 g, Daily  potassium chloride, 20 mEq, Once  QUEtiapine, 50 mg, HS  senna, 17.6 mg, BID  sertraline, 100 mg, Daily  sulfamethoxazole-trimethoprim, 1 tablet, Once per day on Mon Wed Fri      alteplase, 2 mg, Q1MIN PRN  alteplase, 2 mg, Q1MIN PRN  alteplase, 2 mg, Q1MIN PRN  alteplase, 2 mg, Q1MIN PRN  alteplase, 2 mg, Q1MIN PRN  levalbuterol, 1.25 mg, Q4H PRN  ondansetron, 4 mg, Q8H PRN  ondansetron, 4 mg, Q8H PRN  oxyCODONE, 2.5 mg, Q6H PRN       Continuous          Labs:    CBC    Recent Labs     12/01/23  1310 12/02/23  1115   WBC  --  4.34   HGB 9.3* 7.3*   HCT  --  23.5*   PLT  --  180   BANDSPCT  --  1     BMP    Recent Labs     12/01/23  1804 12/02/23  1115   SODIUM 138 138   K 3.3* 4.2   CL 99 105   CO2 30 28   AGAP 9 5   BUN 38* 30*   CREATININE 1.33* 1.28   CALCIUM 9.5 9.2       Coags    No recent results     Additional Electrolytes  Recent Labs     12/01/23  1804 12/02/23  1115   MG 1.9 2.1   PHOS 3.4  --    CAIONIZED 1.19  --           Blood Gas    No recent results  No recent results LFTs  No recent results    Infectious  No recent results  Glucose  Recent Labs     12/01/23  1310 12/01/23  1804 12/02/23  1115   GLUC 116 116 153*               Lowell Brice MD

## 2023-12-03 NOTE — ASSESSMENT & PLAN NOTE
Recent Labs     12/01/23  0510 12/01/23  1310 12/02/23  1115   HGB 8.5* 9.3* 7.3*       '  Patient received 1 PRBC transfusion on 10/5 and 10/25. Her B/L hemoglobin is 12-14. No sites of bleeding, no black stools. Iron panel indicative of inflammatory anemia. Normal Vitamin B12 levels. Folate low at 5.5. Filgrastim 4 doses administered by heme/onc. Hb declined from 9.9 to 7.4, pt received 1 PRBC transfusion (11/24)    Plan:  Trend hemoglobin and transfuse for <7. As per heme/onc transfuse irradiated and leukoreduced blood products. Trend platelets  Folic acid 1mg daily supplementation  As per conversation with Heme/oncology attending, patient pancytopenia associated with chemotherapy is expected/predictable. No further anemia w/u required.   Low ANC management as per heme/onc

## 2023-12-03 NOTE — ASSESSMENT & PLAN NOTE
Large B-cell lymphoma, stage II. First chemo cycle 9/22 4 days of R-EPOCH. 9/27 Rituxan. 9/28 Filgrastim. Received nivestym 300 mc 7 days dcd on 10/26   Discontinue Prophylaxis as per Heme Oncology recommendations  Pt had hyperkalemia of 5.5, can be due to Bactrim, consider switching to atovaquone      9/22  4 days of R-EPOCH.  9/27 Rituxan  10/13 for [facility] cycle 2, which was done over 4 days and cytoxan on 10/19.  11/6 completed R-EPOCH cycle 3 and Cytoxan 11/7. Completed 4th cycle of chemotherapy from 11/24-11/28. Plan:  Continue Bactrim, Acyclovir, Allopurinol for prophylaxis   Transfuse blood products as needed.   Keep Hgb>7 and Plt>20 for chemo   See acute respiratory failure above

## 2023-12-03 NOTE — ASSESSMENT & PLAN NOTE
Lab Results   Component Value Date    EGFR 42 12/02/2023    EGFR 40 12/01/2023    EGFR 41 12/01/2023    CREATININE 1.28 12/02/2023    CREATININE 1.33 (H) 12/01/2023    CREATININE 1.31 (H) 12/01/2023     Baseline Cr between 0.75-1.1  Initial PO felt 2/2 prerenal azotemia 2/2 diuresis, reduced PO intake +/- ATN. Patient received 2 doses of Bumex IV 2 g as she had not been responding appropriately to IV 40 Lasix daily. Creatinine went up by 0.5 meeting criteria for an PO. Suspect most likely due to medications as a combination of prerenal and intrinsic. FENa was 0.2%, UA shows no casts or signs of infection, urine eosinophils 0%. Patient likely has prerenal PO from volume depletion. Received 2 L NS and 1 pRBC and cr went up by 0.07 still and Na and Cl went down, suspect that volume prevented further cr rise and free water excretion impaired by kidney function, allowing hyponatremia to increase. Suspect that now that nephrotoxic medications have been stopped she responded well to Lasix and creatinine downtrended. PO now resolved. Will be cautious about nephrotoxins and monitor as restarting EPOCH and ppx. Patient gets volume depleted and overloaded very easily. Especially from chemo. Plan:   Continue to monitor UO/renal function.    Avoid nephrotoxic agents

## 2023-12-03 NOTE — ASSESSMENT & PLAN NOTE
Malnutrition Findings:   Adult Malnutrition type: Chronic illness  Adult Degree of Malnutrition: Unspecified protein calorie malnutrition  Malnutrition Characteristics: Inadequate energy, Other (comment) (stress of illness)                  360 Statement: Protein calorie malnutrition r/t inadequate intake as evidenced by >7 day period of poor intakes and stress of infection; currently treated with regular diet and nutrition supplements    BMI Findings: Body mass index is 33.2 kg/m².

## 2023-12-04 ENCOUNTER — HOME CARE VISIT (OUTPATIENT)
Dept: HOME HEALTH SERVICES | Facility: HOME HEALTHCARE | Age: 68
End: 2023-12-04
Payer: COMMERCIAL

## 2023-12-04 ENCOUNTER — HOME CARE VISIT (OUTPATIENT)
Dept: HOME HEALTH SERVICES | Facility: HOME HEALTHCARE | Age: 68
End: 2023-12-04

## 2023-12-04 VITALS
HEIGHT: 60 IN | WEIGHT: 156.09 LBS | OXYGEN SATURATION: 98 % | SYSTOLIC BLOOD PRESSURE: 142 MMHG | RESPIRATION RATE: 18 BRPM | DIASTOLIC BLOOD PRESSURE: 70 MMHG | TEMPERATURE: 100 F | HEART RATE: 121 BPM | BODY MASS INDEX: 30.64 KG/M2

## 2023-12-04 LAB
ABO GROUP BLD: NORMAL
ANION GAP SERPL CALCULATED.3IONS-SCNC: 4 MMOL/L
BLD GP AB SCN SERPL QL: NEGATIVE
BUN SERPL-MCNC: 22 MG/DL (ref 5–25)
CALCIUM SERPL-MCNC: 9.4 MG/DL (ref 8.4–10.2)
CHLORIDE SERPL-SCNC: 103 MMOL/L (ref 96–108)
CO2 SERPL-SCNC: 28 MMOL/L (ref 21–32)
CREAT SERPL-MCNC: 1.11 MG/DL (ref 0.6–1.3)
ERYTHROCYTE [DISTWIDTH] IN BLOOD BY AUTOMATED COUNT: 15.6 % (ref 11.6–15.1)
FUNGUS SPEC CULT: NORMAL
FUNGUS SPEC CULT: NORMAL
GFR SERPL CREATININE-BSD FRML MDRD: 50 ML/MIN/1.73SQ M
GLUCOSE SERPL-MCNC: 115 MG/DL (ref 65–140)
HCT VFR BLD AUTO: 21.8 % (ref 34.8–46.1)
HGB BLD-MCNC: 6.8 G/DL (ref 11.5–15.4)
HGB BLD-MCNC: 6.8 G/DL (ref 11.5–15.4)
MCH RBC QN AUTO: 30 PG (ref 26.8–34.3)
MCHC RBC AUTO-ENTMCNC: 31.2 G/DL (ref 31.4–37.4)
MCV RBC AUTO: 96 FL (ref 82–98)
NRBC BLD AUTO-RTO: 0 /100 WBCS
PLATELET # BLD AUTO: 107 THOUSANDS/UL (ref 149–390)
PMV BLD AUTO: 10.2 FL (ref 8.9–12.7)
POTASSIUM SERPL-SCNC: 4.7 MMOL/L (ref 3.5–5.3)
RBC # BLD AUTO: 2.27 MILLION/UL (ref 3.81–5.12)
RH BLD: POSITIVE
SODIUM SERPL-SCNC: 135 MMOL/L (ref 135–147)
SPECIMEN EXPIRATION DATE: NORMAL
WBC # BLD AUTO: 0.52 THOUSAND/UL (ref 4.31–10.16)

## 2023-12-04 PROCEDURE — 10330081 VN NO-PAY CLAIM PROCEDURE

## 2023-12-04 PROCEDURE — 30243N1 TRANSFUSION OF NONAUTOLOGOUS RED BLOOD CELLS INTO CENTRAL VEIN, PERCUTANEOUS APPROACH: ICD-10-PCS | Performed by: INTERNAL MEDICINE

## 2023-12-04 PROCEDURE — 86900 BLOOD TYPING SEROLOGIC ABO: CPT

## 2023-12-04 PROCEDURE — 86850 RBC ANTIBODY SCREEN: CPT

## 2023-12-04 PROCEDURE — 86901 BLOOD TYPING SEROLOGIC RH(D): CPT

## 2023-12-04 PROCEDURE — 86923 COMPATIBILITY TEST ELECTRIC: CPT

## 2023-12-04 PROCEDURE — 99239 HOSP IP/OBS DSCHRG MGMT >30: CPT | Performed by: INTERNAL MEDICINE

## 2023-12-04 PROCEDURE — NC001 PR NO CHARGE: Performed by: INTERNAL MEDICINE

## 2023-12-04 PROCEDURE — G0299 HHS/HOSPICE OF RN EA 15 MIN: HCPCS

## 2023-12-04 PROCEDURE — 85018 HEMOGLOBIN: CPT

## 2023-12-04 PROCEDURE — 80048 BASIC METABOLIC PNL TOTAL CA: CPT

## 2023-12-04 PROCEDURE — P9040 RBC LEUKOREDUCED IRRADIATED: HCPCS

## 2023-12-04 PROCEDURE — 94640 AIRWAY INHALATION TREATMENT: CPT

## 2023-12-04 PROCEDURE — 85027 COMPLETE CBC AUTOMATED: CPT

## 2023-12-04 PROCEDURE — 94760 N-INVAS EAR/PLS OXIMETRY 1: CPT

## 2023-12-04 PROCEDURE — 400013 VN SOC

## 2023-12-04 RX ORDER — SERTRALINE HYDROCHLORIDE 100 MG/1
100 TABLET, FILM COATED ORAL DAILY
Qty: 30 TABLET | Refills: 0 | Status: SHIPPED | OUTPATIENT
Start: 2023-12-04 | End: 2024-01-03

## 2023-12-04 RX ORDER — LEVALBUTEROL INHALATION SOLUTION 1.25 MG/3ML
1.25 SOLUTION RESPIRATORY (INHALATION) EVERY 4 HOURS PRN
Qty: 90 ML | Refills: 0 | Status: CANCELLED | OUTPATIENT
Start: 2023-12-04

## 2023-12-04 RX ORDER — SODIUM CHLORIDE 9 MG/ML
5 INJECTION INTRAVENOUS 2 TIMES DAILY PRN
Qty: 60 ML | Refills: 0 | Status: SHIPPED | OUTPATIENT
Start: 2023-12-04 | End: 2024-01-03

## 2023-12-04 RX ORDER — POLYETHYLENE GLYCOL 3350 17 G/17G
17 POWDER, FOR SOLUTION ORAL DAILY
Refills: 0 | Status: CANCELLED | OUTPATIENT
Start: 2023-12-05

## 2023-12-04 RX ADMIN — FILGRASTIM-AAFI 300 MCG: 300 INJECTION, SOLUTION SUBCUTANEOUS at 12:11

## 2023-12-04 RX ADMIN — SULFAMETHOXAZOLE AND TRIMETHOPRIM 1 TABLET: 800; 160 TABLET ORAL at 08:49

## 2023-12-04 RX ADMIN — FAMOTIDINE 20 MG: 20 TABLET, FILM COATED ORAL at 08:49

## 2023-12-04 RX ADMIN — FORMOTEROL FUMARATE DIHYDRATE 20 MCG: 20 SOLUTION RESPIRATORY (INHALATION) at 07:13

## 2023-12-04 RX ADMIN — LEVALBUTEROL HYDROCHLORIDE 1.25 MG: 1.25 SOLUTION RESPIRATORY (INHALATION) at 02:16

## 2023-12-04 RX ADMIN — IPRATROPIUM BROMIDE 0.5 MG: 0.5 SOLUTION RESPIRATORY (INHALATION) at 02:16

## 2023-12-04 RX ADMIN — FOLIC ACID 1 MG: 1 TABLET ORAL at 08:49

## 2023-12-04 RX ADMIN — LEVOFLOXACIN 250 MG: 250 TABLET, FILM COATED ORAL at 08:49

## 2023-12-04 RX ADMIN — LEVALBUTEROL HYDROCHLORIDE 1.25 MG: 1.25 SOLUTION RESPIRATORY (INHALATION) at 13:19

## 2023-12-04 RX ADMIN — FLUCONAZOLE 200 MG: 100 TABLET ORAL at 08:49

## 2023-12-04 RX ADMIN — LEVALBUTEROL HYDROCHLORIDE 1.25 MG: 1.25 SOLUTION RESPIRATORY (INHALATION) at 07:13

## 2023-12-04 RX ADMIN — CHLORHEXIDINE GLUCONATE 15 ML: 1.2 SOLUTION ORAL at 08:49

## 2023-12-04 RX ADMIN — METOPROLOL TARTRATE 25 MG: 25 TABLET, FILM COATED ORAL at 08:54

## 2023-12-04 RX ADMIN — ACYCLOVIR 400 MG: 200 CAPSULE ORAL at 08:52

## 2023-12-04 RX ADMIN — ALLOPURINOL 200 MG: 100 TABLET ORAL at 08:49

## 2023-12-04 RX ADMIN — OXYCODONE HYDROCHLORIDE 5 MG: 5 SOLUTION ORAL at 04:33

## 2023-12-04 RX ADMIN — NICOTINE 7 MG: 7 PATCH, EXTENDED RELEASE TRANSDERMAL at 08:50

## 2023-12-04 RX ADMIN — GABAPENTIN 300 MG: 300 CAPSULE ORAL at 08:49

## 2023-12-04 RX ADMIN — BUDESONIDE 0.5 MG: 0.5 INHALANT ORAL at 07:13

## 2023-12-04 RX ADMIN — APIXABAN 5 MG: 5 TABLET, FILM COATED ORAL at 08:49

## 2023-12-04 RX ADMIN — SERTRALINE HYDROCHLORIDE 100 MG: 50 TABLET ORAL at 08:49

## 2023-12-04 RX ADMIN — IPRATROPIUM BROMIDE 0.5 MG: 0.5 SOLUTION RESPIRATORY (INHALATION) at 13:19

## 2023-12-04 RX ADMIN — IPRATROPIUM BROMIDE 0.5 MG: 0.5 SOLUTION RESPIRATORY (INHALATION) at 07:13

## 2023-12-04 NOTE — ASSESSMENT & PLAN NOTE
Recent Labs     12/03/23  0904 12/04/23  0434 12/04/23  0523   HGB 7.4* 6.8* 6.8*       '  Patient received 1 PRBC transfusion on 10/5 and 10/25. Her B/L hemoglobin is 12-14. No sites of bleeding, no black stools. Iron panel indicative of inflammatory anemia. Normal Vitamin B12 levels. Folate low at 5.5. Filgrastim 4 doses administered by heme/onc. Hb declined from 9.9 to 7.4, pt received 1 PRBC transfusion (11/24)  Hb level at 6.8 (12/4), received 1 unit of PRBC. Patient is advised to stay in the hospital however he is leaving [address] [place] [address].     Plan:  Patient will receive CBC and platelet blood work tomorrow and will follow-up with her PCP for further management

## 2023-12-04 NOTE — ASSESSMENT & PLAN NOTE
Recent Labs     12/03/23  0904 12/04/23  0434 12/04/23  0523   HGB 7.4* 6.8* 6.8*       '  Patient received 1 PRBC transfusion on 10/5 and 10/25. Her B/L hemoglobin is 12-14. No sites of bleeding, no black stools. Iron panel indicative of inflammatory anemia. Normal Vitamin B12 levels. Folate low at 5.5. Filgrastim 4 doses administered by heme/onc. Hb declined from 9.9 to 7.4, pt received 1 PRBC transfusion (11/24)  Hb level at 6.8 (12/4)    Plan:  Trend hemoglobin and transfuse for <7. As per heme/onc transfuse irradiated and leukoreduced blood products. Trend platelets  Folic acid 1mg daily supplementation  As per conversation with Heme/oncology attending, patient pancytopenia associated with chemotherapy is expected/predictable. No further anemia w/u required.   Low ANC management as per heme/onc

## 2023-12-04 NOTE — ASSESSMENT & PLAN NOTE
Malnutrition Findings:   Adult Malnutrition type: Chronic illness  Adult Degree of Malnutrition: Unspecified protein calorie malnutrition  Malnutrition Characteristics: Inadequate energy, Other (comment) (stress of illness)                  360 Statement: Protein calorie malnutrition r/t inadequate intake as evidenced by >7 day period of poor intakes and stress of infection; currently treated with regular diet and nutrition supplements    BMI Findings: Body mass index is 30.48 kg/m².

## 2023-12-04 NOTE — ASSESSMENT & PLAN NOTE
9/14 Neck/ soft tissue CT: Large enhancing soft tissue mass identified within the left neck involving multiple spaces. Centered within the left oropharynx with extensions as described above into multiple spaces. This results in significant airway compromise. 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 Small level 3/level 4 nodes are seen within the neck. Tracheostomy by ENT. Replaced on 9/26. H/o tobacco abuse, daily smoker. On nicotine while inpatient, confirmed with patient she needs nicotine patch. CT soft tissue neck shows reduced mass effect from 9/14 to 10/13. Can consider reducing trach size if continues to improve. Patient often refusing peg tube feeds but is feeding herself small meals orally.   ENT and heme onc following  Pt received her 4th cycle of chemotherapy     Plan:  Discontinue prophylaxis meds including acyclovir, Bactrim, allopurinol, Leuconostoc  Discontinue filgrastim  Follow-up with heme-onc on December 12, 2023

## 2023-12-04 NOTE — ASSESSMENT & PLAN NOTE
Large B-cell lymphoma, stage II. First chemo cycle 9/22 4 days of R-EPOCH. 9/27 Rituxan. 9/28 Filgrastim. Received nivestym 300 mc 7 days dcd on 10/26   Discontinue Prophylaxis as per Heme Oncology recommendations  Pt had hyperkalemia of 5.5, can be due to Bactrim, consider switching to atovaquone      9/22  4 days of R-EPOCH.  9/27 Rituxan  10/13 for [facility] cycle 2, which was done over 4 days and cytoxan on 10/19.  11/6 completed R-EPOCH cycle 3 and Cytoxan 11/7. Completed 4th cycle of chemotherapy from 11/24-11/28. Plan:  Discontinue Bactrim, Acyclovir, Allopurinol for prophylaxis   Discontinue filgrastim  Patient's hemoglobin level was at 6.8 today, patient received 1 PRBC. Patient is advised to stay in the hospital however is leaving [address] [place] [address].   Patient will have a CBC and platelet tomorrow morning, will follow-up with PCP for further management    See acute respiratory failure above

## 2023-12-04 NOTE — ASSESSMENT & PLAN NOTE
Large B-cell lymphoma, stage II. First chemo cycle 9/22 4 days of R-EPOCH. 9/27 Rituxan. 9/28 Filgrastim. Received nivestym 300 mc 7 days dcd on 10/26   Discontinue Prophylaxis as per Heme Oncology recommendations  Pt had hyperkalemia of 5.5, can be due to Bactrim, consider switching to atovaquone      9/22  4 days of R-EPOCH.  9/27 Rituxan  10/13 for [facility] cycle 2, which was done over 4 days and cytoxan on 10/19.  11/6 completed R-EPOCH cycle 3 and Cytoxan 11/7. Completed 4th cycle of chemotherapy from 11/24-11/28. Plan:  Continue Bactrim, Acyclovir, Allopurinol for prophylaxis   Transfuse blood products as needed.   Keep Hgb>7 and Plt>20 for chemo   See acute respiratory failure above

## 2023-12-04 NOTE — ASSESSMENT & PLAN NOTE
Patient on Zoloft 100 daily and Seroquel hs   Had multiple family/palliative discussions. Currently goal is disease treatment oriented. Full code. No SI/HI ideations. Palliative signed off, patient declines intervention and will follow-up outpatient. Given decline in mood, will consider increasing Sertraline to 100mg daily.

## 2023-12-04 NOTE — ASSESSMENT & PLAN NOTE
Home regimen: Coreg 12.5 mg BID, Amlodipine 10 mg daily, and lisinopril 40 mg. Noted to become hypotensive after undergoing chemo cycles. Discontinued Coreg given chemo tx. Plan:  Continue lopressor 25 bid.   Discontinue amlodipine and Coreg

## 2023-12-04 NOTE — ASSESSMENT & PLAN NOTE
Home regimen: Oxycodone 5 mg BID, Tylenol 650 mg q8 prn. Gabapentin 600 mg TID. Medical marijuana. Lumbar radiculopathy and impaired ambulatory dysfunction secondary to pain. Has bowel regimen and is having bowel movements daily. Patient required additional pain management during previous cycle and has some additional pain from tunneled line placement    Plan:  Continue gabapentin 300 mg TID, oxycodone 5 mg TID for 5 days to which PCP will take over her pain management. Patient will be discharged on short course of pain meds, but will follow-up with PCP for further management.

## 2023-12-04 NOTE — DISCHARGE INSTR - AVS FIRST PAGE
Dear Leeanna Powell,     It was our pleasure to care for you here at [facility], [employer]. It is our hope that we were always able to exceed the expected standards for your care during your stay. You were hospitalized due to Shortness of breath. You were cared for on the ICU floor by Tiana Ba MD under the service of Basim Rosas MD with the [facility] who covers for your primary care physician (PCP), Ken Dover MD, while you were hospitalized. If you have any questions or concerns related to this hospitalization, you may contact us at [phone]. For follow up as well as any medication refills, we recommend that you follow up with your primary care physician. A registered nurse will reach out to you by phone within a few days after your discharge to answer any additional questions that you may have after going home.   However, at this time we provide for you here, the most important instructions / recommendations at discharge:     Notable Medication Adjustments -   Please start taking Eliquis 2 times a day  Please start taking Folvite 1 mg once daily  Please start using Perforomist nebulizer twice daily  Start taking gabapentin 3 times daily  Please start taking Atrovent nebulizer 4 times daily  Please start taking Xopenex nebulizer 4 times daily  Please start taking melatonin daily at bedtime  Start taking Lopressor 25 Mg twice daily  Start taking Seroquel daily  Please use sodium chloride flushes as needed  Start taking sertraline 100 Mg daily  Continue taking Tylenol as needed  Please continue taking albuterol inhaler 2 puffs every 6 hours as needed  Please start taking BuSpar 30 Mg 3 times a day  Please start taking Pepcid 20 Mg twice daily  Please continue taking naloxone 1 spray into nostril  Please place 1 nicotine patch over the skin over 24 hours daily   Please stop taking Coreg 25 mg twice daily   Please stop taking amlodipine 10 Mg  Testing Required after Discharge -   Please make sure to get a repeat CBC checked tomorrow, on 12/5/2023. ** Please contact your PCP to request testing orders for any of the testing recommended here **  Important follow up information -   Please follow-up with heme-onc for further management  Please follow-up with your PCP for further management  Other Instructions -   Come back to the hospital if you experience any shortness of breath, chest pain, abdominal pain or dyspnea  Please review this entire after visit summary as additional general instructions including medication list, appointments, activity, diet, any pertinent wound care, and other additional recommendations from your care team that may be provided for you.       Sincerely,     Padmini Alexander MD

## 2023-12-04 NOTE — ASSESSMENT & PLAN NOTE
Continue Zoloft 100 daily and Seroquel hs   Had multiple family/palliative discussions. No SI/HI ideations. Palliative signed off, patient declines intervention and will follow-up outpatient.

## 2023-12-04 NOTE — ASSESSMENT & PLAN NOTE
Right forearm soreness. RUE US: No acute or chronic deep vein thrombosis. 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 PO not severe enough to require renal dosing at this time, will continue to monitor and adjust dosing as needed.       Eliquis 5 Mg

## 2023-12-04 NOTE — ASSESSMENT & PLAN NOTE
Wt Readings from Last 3 Encounters:   12/04/23 70.8 kg (156 lb 1.4 oz)   09/07/23 74.6 kg (164 lb 6.4 oz)   08/30/23 73.3 kg (161 lb 9.6 oz)     Lab Results   Component Value Date    LVEF 65 11/21/2023     (H) 10/28/2023     (H) 09/29/2023     Echo 11/21/23: LVEF 65%.        Plan:  Patient is leaving [address] [place] [address] and will require follow-up with her PCP  Continue metoprolol 25 Mg twice daily  Discontinue amlodipine 10 Mg

## 2023-12-04 NOTE — CASE MANAGEMENT
Case Management Progress Note    Patient name Wilma Bacon  Location ICU 03/ICU 03 MRN [id number]  : 1955 Date 2023       LOS (days): 80  Geometric Mean LOS (GMLOS) (days): 26.10  Days to GMLOS:-55.9        OBJECTIVE:    Current admission status: Inpatient  Preferred Pharmacy:   [id number] [place] - [address]. [address] [facility] [address]  Phone: [phone] Fax: [phone] [facility] [address] [facility] [place]  [address] [facility] [facility] [address]  Phone: [phone] Fax: [phone]    Primary Care Provider: Vanna Najjar, MD    Primary Insurance: [facility]  Secondary Insurance: [facility]    PROGRESS NOTE:     notified by Dr. Alba Lock that pt is not medically stable for dc to home today. Plan to meet with the pt and her daughter, Charli Maciel, who is at bedside to notify them of same. CM accompanied Dr. Alba Lock, ICU Nurse Manager, pt's bedside Nurse, and residents. Dr. Alba Lock reviewed concerns with pt drop in Hgb over the past few days. He would recommend blood transfusion with follow up lab work this evening and tomorrow morning. Dr. Alba Lock also discussed pt need to be suctioned overnight. Asked if the daughter would like to spend the night at the hospital to continue more education on pt care. Pt and daughter would like pt to return home today. They are aware that [facility] can f/u with lab work. Dr. Alba Lock would like the PCP to review lab work and provide recommendations. CM contacted  [facility]. They are able to arrange pt to have blood work drawn tomorrow. CM notified by nursing that pt daughter, Charli Maciel, would like to speak with CM. CM notified that pt was asked to sign an AMA form. CM spoke with Charli Maciel and she was not under the understanding pt would be d/c home AMA. CM requested Dr. Alba Lock speak with pt and daughter again.  CM met with Dr. Alba Lock, residents, ICU Manager, and pt's daughter. Dr. Miah King reviewed recommendation of blood transfusion. Concern with hgb dropping. As per Dr. Miah King he would like to see pt hgb stable come tomorrow prior to dc. He did review that if pt decides to leave that this would be AMA. Dr. Miah King reviewed the risk of going home today and if the hgb continue to drop. CM did review that dcp can be re-arranged for dc tomorrow if pt is medically cleared for dc. Daughter confirmed they still plan for pt to return home today. Pt is agreeable to a blood transfusion prior to dc. Lily Batono was notified that leaving [address] put's pt at risk for a bill. All aware that pt would d/c after her transfusion. Unfortunately due to timing of transfusion need to re-arrange transport. SHIRA requested a 2 pm transport as pt infusion would be completed. SHIRA spoke with Araceli RAJPUT with [address] [place] [address]. Araceli will come to the bedside to do more education with the vent and the family since pt will not be home at 1 pm. Araceli will also meet pt and family at the home. Needs update on time for transport to home. 2 pm transport confirmed with [facility]. Nursing and family aware of same. SHIRA reached out to [address] from [address] [place] [address]. She is aware of re-scheduled transport time. SHIRA also confirmed with  [facility] that 4 pm for [facility] is good. SHIRA received a phone call from [facility] with [address]  [facility]. CM reviewed care needs and all that has been done in the hospital. CM reviewed family teaching with nursing and [facility] health. SHIRA also reviewed all DME that has been ordered and delivered to pt home. SHIRA met with pt daughter, Lily Chawla, at bedside. She re-confirmed 24/7 care from the family. Lily Chawla feels she has good education, training, and understanding of pt care needs for home. Lily Chawla feels if pt were to stay any longer in the hospital it will become more detrimental to pt's mental health.  Lily Chawla reports she did get a baby monitor for pt room in order to see/communicate if pt has care needs if Lily Chawla is out of pt's bedroom. Pt is well supported by her daughter, DIL, son, and family members.

## 2023-12-04 NOTE — ASSESSMENT & PLAN NOTE
Cuffless trach placed on 9/17. Transitioned to cuffed trach on 10/3. Respiratory breathing has been challenging in the setting of anxiety given recent events. Repeat imaging does not show improvement in bilateral consolidation or atelectasis and groundglass opacities on repeat. S/p bronch. Bronchial cx with 3 colonies CoNS. Completed 7 day course of IV abx w/ cefepime and Vancomycin. Bronchial culture and gram stain negative. Micro negative for CMV, AFB, Fungitell, Pneumocystis jiroveci (carinii), Histoplasma, Aspergillus. Trach secretions sent for sputum culture shows 2+ polys, 1+ gram-positive cocci in pairs, chains and clusters and 1+ gram-negative rods. Cuffless trach was placed 9/17. Replaced with a cuffed Shiley XLT #7 10/3. Etiology: Drug induced pneumonitis in the setting of chemo 2/2 newly dx lymphoma. CT chest showed Infiltrates in the lingula and left lower lobe. Improved from prior study. Received home vent yesterday and tolerated it overnight. No changes made to vent. Plan:  Continue w/ PS 4/8 40% with cuff up overnight. During day on 10L w/ FiO2 40% with cuff down. Goal setting 4/8 at FiO2 30%. Continue to titrate O2 to maintain SpO2 >85%  Aggressive pulmonary toilet   Incentive Spirometry   Suctioning via trach if patient is desatting and unable to expectorate phlegm. Continue w/ guaifenesin, Atrovent and Xopenex for airway maintenance.     Continue inhalation treatment with Pulmicort and formoterol q12 , duonebs QID, atrovent and xopenex   Regular diet  Daily PT/OT

## 2023-12-04 NOTE — INCIDENTAL FINDINGS
The following findings require follow up:  Radiographic finding   Finding:  CT soft tissue neck w contrast: Large enhancing soft tissue mass identified within the left neck involving multiple spaces. This appears to be centered within the left oropharynx with extensions as described above into multiple spaces. This results in significant airway compromise. 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 Small level 3 and level 4 nodes are seen within the neck., I personally discussed this study with ICU resident on 9/14/2023 4:44 PM., Workstation performed: [id number]   CT pe study w abdomen pelvis w contrast: 1. Suboptimal opacification of the pulmonary arteries. No large central pulmonary embolism is seen., 2. Redemonstrated dependent atelectasis in the right greater than left lower lobes and in the left upper lobe/lingula. Increased patchy opacity in the right lower lobe could be due to atelectasis or pneumonia., 3. Diffuse bilateral groundglass opacities may be infectious or inflammatory. , 4. Hilar and mediastinal lymphadenopathy similar to prior. , 5. No acute findings in the abdomen or pelvis., 6. Endometrium appears thickened in this postmenopausal patient. Prior pelvic ultrasound 10/26/2020 showed abnormal thickened endometrium. Recommend nonemergent gynecologic evaluation if not already performed. , 7. Chronic lytic lesion in T12 vertebral body gradually progressing since 2013 with chronic pathologic fracture., The study was marked in EPIC for immediate notification. , Workstation performed: [id number]   CT chest abdomen pelvis w contrast: Diffuse bilateral groundglass opacities and areas of more dense consolidation as described above which could represent pulmonary edema, pneumonia, or diffuse alveolar damage. , Complex right upper pole renal lesion for which further evaluation is recommended with MRI on an outpatient basis. , Mildly enlarged mediastinal lymph nodes which may be due to the patient's known lymphoma., The study was marked in EPIC for immediate notification. , Workstation performed: [id number]   CT soft tissue neck w contrast: Improved ill-defined mass centered along the left nasopharynx and oropharynx with regional extension of disease as described above. Findings consistent with the clinical history of lymphoma. No new suspicious adenopathy identified. , Status post right-sided Mediport placement.  Nonocclusive thrombus is seen within the right internal jugular vein just above the entrance site of the Mediport catheter into the internal jugular vein., Improved previously seen consolidation and effusion in the visualized lung fields   Follow up required: Heme-onc follow-up   Follow up should be done within 10 day(s)    Please notify the following clinician to assist with the follow up:   Dr. Miller Muss

## 2023-12-04 NOTE — ASSESSMENT & PLAN NOTE
Lab Results   Component Value Date    EGFR 50 12/04/2023    EGFR 53 12/03/2023    EGFR 42 12/02/2023    CREATININE 1.11 12/04/2023    CREATININE 1.05 12/03/2023    CREATININE 1.28 12/02/2023     Baseline Cr between 0.75-1.1  Initial PO felt 2/2 prerenal azotemia 2/2 diuresis, reduced PO intake +/- ATN. Patient received 2 doses of Bumex IV 2 g as she had not been responding appropriately to IV 40 Lasix daily. Creatinine went up by 0.5 meeting criteria for an PO. Suspect most likely due to medications as a combination of prerenal and intrinsic. FENa was 0.2%, UA shows no casts or signs of infection, urine eosinophils 0%. Patient likely has prerenal PO from volume depletion. Received 2 L NS and 1 pRBC and cr went up by 0.07 still and Na and Cl went down, suspect that volume prevented further cr rise and free water excretion impaired by kidney function, allowing hyponatremia to increase. Suspect that now that nephrotoxic medications have been stopped she responded well to Lasix and creatinine downtrended. PO now resolved. Will be cautious about nephrotoxins and monitor as restarting EPOCH and ppx. Patient gets volume depleted and overloaded very easily. Especially from chemo.      Plan:   Follow-up with the PCP for further management

## 2023-12-04 NOTE — CASE MANAGEMENT
Case Management Discharge Planning Note    Patient name Ahmad Alpers  Location ICU 03/ICU 03 MRN [id number]  : 1955 Date 2023       Current Admission Date: 2023  Current Admission Diagnosis:Acute respiratory failure with hypoxia secondary to oropharyngeal mass   Patient Active Problem List    Diagnosis Date Noted    Acute embolism and thrombosis of right internal jugular vein ([address]) 2023    Protein-calorie malnutrition ([address]) 2023    Pancytopenia due to chemotherapy ([address]) 10/23/2023    Depression 10/02/2023    Chronic Superficial thrombophlebitis 2023    Large cell lymphoma ([address]) 2023    Chemotherapy induced neutropenia  2023    Blister (nonthermal) of left forearm, sequela 2023    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    Ambulatory dysfunction 2023    Acute respiratory failure with hypoxia secondary to oropharyngeal mass 2023    (HFpEF) heart failure with preserved ejection fraction ([address]) 2023    Pulmonary embolism ([address]) 2023    CKD (chronic kidney disease) stage 3, GFR 30-59 ml/min ([facility]) 2021    Bone lesion 2021    Nicotine dependence 06/15/2021    Angio-edema, initial encounter 07/10/2020    Pain syndrome, chronic 2014    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 [address]   [place]te [place] [address]  Phone: [phone] Fax: [phone] [facility] [facility] [place] [address] [address]  Phone: [phone] Fax: [phone]    Primary Care Provider: Mary Ann Garcia MD    Primary Insurance: Jeananne Merlin REP  Secondary Insurance: [address]    DISCHARGE DETAILS:    Discharge planning discussed with[de-identified] pt and her daughter, Jen Avalos of Choice: Yes  Comments - Freedom of Choice: Return home with [facility]    Contacts  Patient Contacts: Linnette-daughter  Relationship to Patient[de-identified] Family  Contact Method: In Person  Reason/Outcome: Continuity of Care, Emergency Contact, Discharge Planning, Referral    Requested [facility]         Is the patient interested in [address] at discharge?: Yes  [facility] requested[de-identified] Nursing, Occupational Therapy, Physical Therapy, Speech Language Pathology  Home Health Agency Name[de-identified] [place] Provider[de-identified] PCP  Home Health Services Needed[de-identified] Gait/ADL Training, Evaluate Functional Status and Safety, Strengthening/Theraputic Exercises to Improve Function, Post-Op Care and Assessment, Other (comment) (TRACH/PEG)  Oxygen LPM Ordered (if applicable based on home health services needed)::  (10L)  Homebound Criteria Met[de-identified] Requires the Assistance of Another Person for Safe Ambulation or to Leave the Home, Uses an Assist Device (i.e. cane, walker, etc)  Supporting Clincal Findings[de-identified] Limited Endurance, Fatigues Easliy in [employer], Requires Oxygen    Treatment Team Recommendation: Home with [facility]  Discharge Destination Plan[de-identified] Home with [facility] at Discharge : [facility]  Dispatcher Contacted: Yes  Number/Name of Dispatcher: Round Trip [phone]  Transported by [facility] and Unit #): [facility]  ETA of Transport (Date): 12/04/23  ETA of Transport (Time): 1200 (CC Resident, Nurse, Pt, daughter Angela Del Rosario)    IMM Given (Date):: 12/04/23  IMM Given to[de-identified] Patient    CM met with pt and her daughter, Angela Del Rosario, at bedside.  Angela Del Rosario confirmed that the concentrators (2), Portable Tanks, hospital bed, and trach supplies (suction included) were delivered to her home. Bhanu Saeed reports she needs a rack for the O2 tanks at home, a two tank portability for the tanks, and the transport chair (was not delivered). Linnette aware CM will reach out to f/u. Bhanu Saeed has been in contact with Araceli from [address] [place] [address] ([place]) and she will  the Home Vent and bring to the house for their 1 pm visit. Linnette and pt aware [facility] will be out at 4 pm today. Linnette aware medication were sent to [facility] and CM will f/u on additional scripts that need to be sent with the physician. SHIRA spoke with Mora Frey at [facility]. He confirmed transport will be at 12 pm with [facility]. Mora Frey aware that pt has been using 10L O2 and 40% FiO2. He requested that [facility] provides a connector for transport. SHIRA notified Dr. Rogers Gallegos that medication needs to be sent to the pharmacy for the daughter to . Requested this is sent asap. SHIRA spoke with Tavon Pinto from [address] [place] [address] - He will reach out to the team to f/u on the W/C, O2 racks, and 2 tanks portable. Spoke with Michael Hummel - [facility] with [address] [place] [address]. He is aware of dc today.

## 2023-12-04 NOTE — ASSESSMENT & PLAN NOTE
Lab Results   Component Value Date    EGFR 50 12/04/2023    EGFR 53 12/03/2023    EGFR 42 12/02/2023    CREATININE 1.11 12/04/2023    CREATININE 1.05 12/03/2023    CREATININE 1.28 12/02/2023     Baseline Cr between 0.75-1.1  Initial PO felt 2/2 prerenal azotemia 2/2 diuresis, reduced PO intake +/- ATN. Patient received 2 doses of Bumex IV 2 g as she had not been responding appropriately to IV 40 Lasix daily. Creatinine went up by 0.5 meeting criteria for an PO. Suspect most likely due to medications as a combination of prerenal and intrinsic. FENa was 0.2%, UA shows no casts or signs of infection, urine eosinophils 0%. Patient likely has prerenal PO from volume depletion. Received 2 L NS and 1 pRBC and cr went up by 0.07 still and Na and Cl went down, suspect that volume prevented further cr rise and free water excretion impaired by kidney function, allowing hyponatremia to increase. Suspect that now that nephrotoxic medications have been stopped she responded well to Lasix and creatinine downtrended. PO now resolved. Will be cautious about nephrotoxins and monitor as restarting EPOCH and ppx. Patient gets volume depleted and overloaded very easily. Especially from chemo. Plan:   Continue to monitor UO/renal function.    Avoid nephrotoxic agents

## 2023-12-04 NOTE — PROGRESS NOTES
[facility]  Progress Note  Name: Shilpa Tavarez  MRN: [id number]  Unit/Bed#: ICU 03 I Date of Admission: 9/13/2023   Date of Service: 12/4/2023 I Hospital Day: 80    Assessment/Plan   * Acute respiratory failure with hypoxia secondary to oropharyngeal mass  Assessment & Plan  Cuffless trach placed on 9/17. Transitioned to cuffed trach on 10/3. Respiratory breathing has been challenging in the setting of anxiety given recent events. Repeat imaging does not show improvement in bilateral consolidation or atelectasis and groundglass opacities on repeat. S/p bronch. Bronchial cx with 3 colonies CoNS. Completed 7 day course of IV abx w/ cefepime and Vancomycin. Bronchial culture and gram stain negative. Micro negative for CMV, AFB, Fungitell, Pneumocystis jiroveci (carinii), Histoplasma, Aspergillus. Trach secretions sent for sputum culture shows 2+ polys, 1+ gram-positive cocci in pairs, chains and clusters and 1+ gram-negative rods. Cuffless trach was placed 9/17. Replaced with a cuffed Shiley XLT #7 10/3. Etiology: Drug induced pneumonitis in the setting of chemo 2/2 newly dx lymphoma. CT chest showed Infiltrates in the lingula and left lower lobe. Improved from prior study. Received home vent yesterday and tolerated it overnight. No changes made to vent. Plan:  Continue w/ PS 4/8 40% with cuff up overnight. During day on 10L w/ FiO2 40% with cuff down. Goal setting 4/8 at FiO2 30%. Continue to titrate O2 to maintain SpO2 >85%  Aggressive pulmonary toilet   Incentive Spirometry   Suctioning via trach if patient is desatting and unable to expectorate phlegm. Continue w/ guaifenesin, Atrovent and Xopenex for airway maintenance. Continue inhalation treatment with Pulmicort and formoterol q12 , duonebs QID, atrovent and xopenex   Regular diet  Daily PT/OT    Large cell lymphoma (HCC)  Assessment & Plan  Large B-cell lymphoma, stage II.  First chemo cycle 9/22 4 days of R-EPOCH. 9/27 Rituxan. 9/28 Filgrastim. Received nivestym 300 mc 7 days dcd on 10/26   Discontinue Prophylaxis as per Heme Oncology recommendations  Pt had hyperkalemia of 5.5, can be due to Bactrim, consider switching to atovaquone      9/22  4 days of R-EPOCH.  9/27 Rituxan  10/13 for [facility] cycle 2, which was done over 4 days and cytoxan on 10/19.  11/6 completed R-EPOCH cycle 3 and Cytoxan 11/7. Completed 4th cycle of chemotherapy from 11/24-11/28. Plan:  Continue Bactrim, Acyclovir, Allopurinol for prophylaxis   Transfuse blood products as needed. Keep Hgb>7 and Plt>20 for chemo   See acute respiratory failure above    Acute embolism and thrombosis of right internal jugular vein (HCC)  Assessment & Plan  CT soft tissues of the neck: Nonocclusive thrombus noted within the right internal jugular vein just above the Mediport entrance site into the internal jugular vein. No signs of pain, headaches, vision changes. Plan:  Continue w/ home Eliquis 5 mg     Oropharyngeal mass  Assessment & Plan  9/14 Neck/ soft tissue CT: Large enhancing soft tissue mass identified within the left neck involving multiple spaces. Centered within the left oropharynx with extensions as described above into multiple spaces. This results in significant airway compromise. 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 Small level 3/level 4 nodes are seen within the neck. Tracheostomy by ENT. Replaced on 9/26. H/o tobacco abuse, daily smoker. On nicotine while inpatient, confirmed with patient she needs nicotine patch. CT soft tissue neck shows reduced mass effect from 9/14 to 10/13. Can consider reducing trach size if continues to improve. Patient often refusing peg tube feeds but is feeding herself small meals orally. ENT and heme onc following  Pt received her 4th cycle of chemotherapy     Plan:  As per speech therapist diet  Dysphagia 2. Pt no longer on PEG tube feeds for nutrition.    See acute respiratory failure and large B cell lymphoma above. Pancytopenia due to chemotherapy [facility])  Assessment & Plan  Recent Labs     12/03/23  0904 12/04/23  0434 12/04/23  0523   HGB 7.4* 6.8* 6.8*       '  Patient received 1 PRBC transfusion on 10/5 and 10/25. Her B/L hemoglobin is 12-14. No sites of bleeding, no black stools. Iron panel indicative of inflammatory anemia. Normal Vitamin B12 levels. Folate low at 5.5. Filgrastim 4 doses administered by heme/onc. Hb declined from 9.9 to 7.4, pt received 1 PRBC transfusion (11/24)  Hb level at 6.8 (12/4)    Plan:  Trend hemoglobin and transfuse for <7. As per heme/onc transfuse irradiated and leukoreduced blood products. Trend platelets  Folic acid 1mg daily supplementation  As per conversation with Heme/oncology attending, patient pancytopenia associated with chemotherapy is expected/predictable. No further anemia w/u required. Low ANC management as per heme/onc    Blister (nonthermal) of left forearm, sequela  Assessment & Plan  Blisters of left upper extremity healing, no signs of infection, continue daily wound care. (HFpEF) heart failure with preserved ejection fraction [facility])  Assessment & Plan  Wt Readings from Last 3 Encounters:   12/04/23 70.8 kg (156 lb 1.4 oz)   09/07/23 74.6 kg (164 lb 6.4 oz)   08/30/23 73.3 kg (161 lb 9.6 oz)     Lab Results   Component Value Date    LVEF 65 11/21/2023     (H) 10/28/2023     (H) 09/29/2023     Echo 11/21/23: LVEF 65%. Plan:  Maintain K > 4 and Mg > 2  Measure I/O    Ambulatory dysfunction  Assessment & Plan  2/2 Impacted by chronic pain syndrome + prolonged hospital stay. Continue OOB with PT. Depression  Assessment & Plan  Patient on Zoloft 100 daily and Seroquel hs   Had multiple family/palliative discussions. Currently goal is disease treatment oriented. Full code. No SI/HI ideations. Palliative signed off, patient declines intervention and will follow-up outpatient.   Given decline in mood, will consider increasing Sertraline to 100mg daily. Chronic Superficial thrombophlebitis  Assessment & Plan  Right forearm soreness. RUE US: No acute or chronic deep vein thrombosis. 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 PO not severe enough to require renal dosing at this time, will continue to monitor and adjust dosing as needed. Given that patient is already on Eliquis, this will serve as VTE ppx. CKD (chronic kidney disease) stage 3, GFR 30-59 ml/min [facility])  Assessment & Plan  Lab Results   Component Value Date    EGFR 50 12/04/2023    EGFR 53 12/03/2023    EGFR 42 12/02/2023    CREATININE 1.11 12/04/2023    CREATININE 1.05 12/03/2023    CREATININE 1.28 12/02/2023     Baseline Cr between 0.75-1.1  Initial PO felt 2/2 prerenal azotemia 2/2 diuresis, reduced PO intake +/- ATN. Patient received 2 doses of Bumex IV 2 g as she had not been responding appropriately to IV 40 Lasix daily. Creatinine went up by 0.5 meeting criteria for an PO. Suspect most likely due to medications as a combination of prerenal and intrinsic. FENa was 0.2%, UA shows no casts or signs of infection, urine eosinophils 0%. Patient likely has prerenal PO from volume depletion. Received 2 L NS and 1 pRBC and cr went up by 0.07 still and Na and Cl went down, suspect that volume prevented further cr rise and free water excretion impaired by kidney function, allowing hyponatremia to increase. Suspect that now that nephrotoxic medications have been stopped she responded well to Lasix and creatinine downtrended. PO now resolved. Will be cautious about nephrotoxins and monitor as restarting EPOCH and ppx. Patient gets volume depleted and overloaded very easily. Especially from chemo. Plan:   Continue to monitor UO/renal function. Avoid nephrotoxic agents    Pain syndrome, chronic  Assessment & Plan  Home regimen: Oxycodone 5 mg BID, Tylenol 650 mg q8 prn. Gabapentin 600 mg TID. Medical marijuana.    Lumbar radiculopathy and impaired ambulatory dysfunction secondary to pain. Has bowel regimen and is having bowel movements daily. Patient required additional pain management during previous cycle and has some additional pain from tunneled line placement    Plan:  Continue gabapentin 300 mg TID, oxycodone 5 mg TID, prn Tylenol 650 mg q6. Oxycodone 5mg every 8 hours  Oxycodone 2.5 mg q6 PRN  Patient will be discharged on short course of pain meds, but will follow-up with PCP for further management. Benign essential hypertension  Assessment & Plan  Home regimen: Coreg 12.5 mg BID, Amlodipine 10 mg daily, and lisinopril 40 mg. Noted to become hypotensive after undergoing chemo cycles. Discontinued Coreg given chemo tx. Amlodipine on hold    Plan:  Monitor vitals. Continue lopressor 25 bid. Will discontinue coreg upon discharge. Protein-calorie malnutrition ([address])  Assessment & Plan  Malnutrition Findings:   Adult Malnutrition type: Chronic illness  Adult Degree of Malnutrition: Unspecified protein calorie malnutrition  Malnutrition Characteristics: Inadequate energy, Other (comment) (stress of illness)                  360 Statement: Protein calorie malnutrition r/t inadequate intake as evidenced by >7 day period of poor intakes and stress of infection; currently treated with regular diet and nutrition supplements    BMI Findings: Body mass index is 30.48 kg/m². Disposition: Stepdown Level 1    ICU Core Measures     Vented Patient  VAP Bundle  VAP bundle ordered     A: Assess, Prevent, and Manage Pain Has pain been assessed? Yes  Need for changes to pain regimen? No   B: Both Spontaneous Awakening Trials (SATs) and Spontaneous Breathing Trials (SBTs) Plan to perform spontaneous awakening trial today? N/A   Plan to perform spontaneous breathing trial today? N/A   Obvious barriers to extubation?  NA   C: Choice of Sedation RASS Goal: 0 Alert and Calm  Need for changes to sedation or analgesia regimen? No   D: Delirium CAM-ICU: Negative   E: Early Mobility  Plan for early mobility? NA   F: Family Engagement Plan for family engagement today? NA       Antibiotic Review: Patient on appropriate coverage based on culture data. Review of Invasive Devices:      Central access plan: Medications requiring central line      Prophylaxis:  VTE VTE covered by:  apixaban, Oral, 5 mg at 12/03/23 2029       Stress Ulcer  covered byfamotidine (PEPCID) tablet 20 mg [[id number]]         Significant 24hr Events     24hr events: Pt was anxious over the night, trach mask was on the side and had mucous plugging. Pt didn't know how to suction. Subjective   Review of Systems   Constitutional:  Negative for chills, fatigue and fever. HENT:  Positive for voice change. Respiratory:  Negative for cough, chest tightness and shortness of breath. Cardiovascular:  Negative for chest pain, palpitations and leg swelling. Gastrointestinal:  Negative for abdominal pain, diarrhea, nausea and vomiting. Endocrine: Negative. Genitourinary: Negative. Neurological: Negative. Negative for dizziness, light-headedness and headaches. Hematological: Negative. Psychiatric/Behavioral: Negative. All other systems reviewed and are negative. Objective                            Vitals I/O      Most Recent Min/Max in 24hrs   Temp 98.7 °F (37.1 °C) Temp  Min: 98.6 °F (37 °C)  Max: 99.4 °F (37.4 °C)   Pulse 93 Pulse  Min: 87  Max: 117   Resp 16 Resp  Min: 14  Max: 21   /62 BP  Min: 92/60  Max: 118/59   O2 Sat 95 % SpO2  Min: 92 %  Max: 100 %      Intake/Output Summary (Last 24 hours) at 12/4/2023 0730  Last data filed at 12/4/2023 0501  Gross per 24 hour   Intake 750 ml   Output 1050 ml   Net -300 ml       Diet Regular; Regular House    Invasive Monitoring           Physical Exam   Physical Exam  Eyes:      General: Vision grossly intact. Extraocular Movements: Extraocular movements intact. Conjunctiva/sclera: Conjunctivae normal.      Pupils: Pupils are equal, round, and reactive to light. Skin:     General: Skin is warm. HENT:      Head: Normocephalic and atraumatic. Right Ear: Hearing normal.      Left Ear: Hearing normal.      Mouth/Throat:      Mouth: Mucous membranes are moist.   Neck:      Trachea: Tracheostomy present. Comments: tunneled DL RIJ catheter w/o purulence  Cardiovascular:      Rate and Rhythm: Normal rate and regular rhythm. Pulses: Normal pulses. Heart sounds: Normal heart sounds. Abdominal: General: Bowel sounds are normal.   Constitutional:       Appearance: She is ill-appearing. Pulmonary:      Effort: No respiratory distress. Breath sounds: Rhonchi present. Chest:      Chest wall: No tenderness. Neurological:      Mental Status: She is alert and oriented to person, place and time. Diagnostic Studies      Imaging: No imaging done in the past 24 hours.       Medications:  Scheduled PRN   acyclovir, 400 mg, BID  allopurinol, 200 mg, 4x Daily  apixaban, 5 mg, BID  budesonide, 0.5 mg, Q12H  busPIRone, 30 mg, TID  chlorhexidine, 15 mL, Q12H ASHLEY  famotidine, 20 mg, BID  Filgrastim-aafi, 300 mcg, Daily  fluconazole, 438 mg, Daily  folic acid, 1 mg, Daily  formoterol, 20 mcg, Q12H  gabapentin, 300 mg, TID  levalbuterol, 1.25 mg, Q6H   And  ipratropium, 0.5 mg, Q6H  levalbuterol, 1.25 mg, Once  levofloxacin, 250 mg, Q24H [address]  melatonin, 3 mg, HS  metoprolol tartrate, 25 mg, Q12H ASHLEY  nicotine, 7 mg, Daily  oxyCODONE, 5 mg, Q8H  polyethylene glycol, 17 g, Daily  potassium chloride, 20 mEq, Once  QUEtiapine, 50 mg, HS  senna, 17.6 mg, BID  sertraline, 100 mg, Daily  sulfamethoxazole-trimethoprim, 1 tablet, Once per day on Mon Wed Fri      alteplase, 2 mg, Q1MIN PRN  alteplase, 2 mg, Q1MIN PRN  alteplase, 2 mg, Q1MIN PRN  alteplase, 2 mg, Q1MIN PRN  alteplase, 2 mg, Q1MIN PRN  levalbuterol, 1.25 mg, Q4H PRN  ondansetron, 4 mg, Q8H PRN  ondansetron, 4 mg, Q8H PRN  oxyCODONE, 2.5 mg, Q6H PRN       Continuous          Labs:    CBC    Recent Labs     12/02/23  1115 12/03/23  0904 12/04/23  0434 12/04/23  0523   WBC 4.34 0.98* 0.52*  --    HGB 7.3* 7.4* 6.8* 6.8*   HCT 23.5* 24.1* 21.8*  --     145* 107*  --    BANDSPCT 1  --   --   --      BMP    Recent Labs     12/03/23  0904 12/04/23  0434   SODIUM 139 135   K 4.4 4.7    103   CO2 30 28   AGAP 4 4   BUN 26* 22   CREATININE 1.05 1.11   CALCIUM 9.5 9.4       Coags    No recent results     Additional Electrolytes  Recent Labs     12/02/23  1115   MG 2.1          Blood Gas    No recent results  No recent results LFTs  No recent results    Infectious  No recent results  Glucose  Recent Labs     12/02/23  1115 12/03/23  0904 12/04/23  0434   GLUC 153* [address] [facility]               Gael Garg MD

## 2023-12-04 NOTE — DISCHARGE SUMMARY
[address] [facility]  Discharge- Poike 1955, 77 y.o. female MRN: [id number]  Unit/Bed#: ICU 03 Encounter: [id number]  Primary Care Provider: Armand Kirk MD   Date and time admitted to hospital: 9/13/2023  4:00 PM    * Acute respiratory failure with hypoxia secondary to oropharyngeal mass  Assessment & Plan  Cuffless trach placed on 9/17. Transitioned to cuffed trach on 10/3. Respiratory breathing has been challenging in the setting of anxiety given recent events. Repeat imaging does not show improvement in bilateral consolidation or atelectasis and groundglass opacities on repeat. S/p bronch. Bronchial cx with 3 colonies CoNS. Completed 7 day course of IV abx w/ cefepime and Vancomycin. Bronchial culture and gram stain negative. Micro negative for CMV, AFB, Fungitell, Pneumocystis jiroveci (carinii), Histoplasma, Aspergillus. Trach secretions sent for sputum culture shows 2+ polys, 1+ gram-positive cocci in pairs, chains and clusters and 1+ gram-negative rods. Cuffless trach was placed 9/17. Replaced with a cuffed Shiley XLT #7 10/3. Etiology: Drug induced pneumonitis in the setting of chemo 2/2 newly dx lymphoma. CT chest showed Infiltrates in the lingula and left lower lobe. Improved from prior study. Received home vent yesterday and tolerated it overnight. No changes made to vent    Patient had patient events last night, was not able to suction herself. Patient is advised to stay in the hospital for further management however patient is leaving [address] [place] [address]. Patient's family member have receive required training to take care of her at home    Plan:  Patient is leaving [address] [place] [address] and is advised to continue pressure support ventilation at home PS 4/8 40% with cuff up overnight. During day on 10L w/ FiO2 40% with cuff down. Goal setting 4/8 at FiO2 30%.   Keep O2 to maintainence SpO2 >85%  Aggressive pulmonary toilet   Suctioning via trach if patient is desatting and unable to expectorate phlegm. Continue Atrovent and Xopenex for airway maintenance. Continue inhalation treatment with Pulmicort and formoterol, duonebs , atrovent and xopenex       Large cell lymphoma ([address])  Assessment & Plan  Large B-cell lymphoma, stage II. First chemo cycle 9/22 4 days of R-EPOCH. 9/27 Rituxan. 9/28 Filgrastim. Received nivestym 300 mc 7 days dcd on 10/26   Discontinue Prophylaxis as per Heme Oncology recommendations  Pt had hyperkalemia of 5.5, can be due to Bactrim, consider switching to atovaquone      9/22  4 days of R-EPOCH.  9/27 Rituxan  10/13 for [facility] cycle 2, which was done over 4 days and cytoxan on 10/19.  11/6 completed R-EPOCH cycle 3 and Cytoxan 11/7. Completed 4th cycle of chemotherapy from 11/24-11/28. Plan:  Discontinue Bactrim, Acyclovir, Allopurinol for prophylaxis   Discontinue filgrastim  Patient's hemoglobin level was at 6.8 today, patient received 1 PRBC. Patient is advised to stay in the hospital however is leaving [address] [place] [address]. Patient will have a CBC and platelet tomorrow morning, will follow-up with PCP for further management    See acute respiratory failure above    Acute embolism and thrombosis of right internal jugular vein (HCC)  Assessment & Plan  CT soft tissues of the neck: Nonocclusive thrombus noted within the right internal jugular vein just above the Mediport entrance site into the internal jugular vein. No signs of pain, headaches, vision changes. Plan:  Continue w/ home Eliquis 5 mg     Oropharyngeal mass  Assessment & Plan  9/14 Neck/ soft tissue CT: Large enhancing soft tissue mass identified within the left neck involving multiple spaces. Centered within the left oropharynx with extensions as described above into multiple spaces. This results in significant airway compromise. 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 Small level 3/level 4 nodes are seen within the neck. Tracheostomy by ENT. Replaced on 9/26. H/o tobacco abuse, daily smoker. On nicotine while inpatient, confirmed with patient she needs nicotine patch. CT soft tissue neck shows reduced mass effect from 9/14 to 10/13. Can consider reducing trach size if continues to improve. Patient often refusing peg tube feeds but is feeding herself small meals orally. ENT and heme onc following  Pt received her 4th cycle of chemotherapy     Plan:  Discontinue prophylaxis meds including acyclovir, Bactrim, allopurinol, Leuconostoc  Discontinue filgrastim  Follow-up with heme-onc on December 12, 2023    Pancytopenia due to chemotherapy [facility])  Assessment & Plan  Recent Labs     12/03/23  0904 12/04/23  0434 12/04/23  0523   HGB 7.4* 6.8* 6.8*       '  Patient received 1 PRBC transfusion on 10/5 and 10/25. Her B/L hemoglobin is 12-14. No sites of bleeding, no black stools. Iron panel indicative of inflammatory anemia. Normal Vitamin B12 levels. Folate low at 5.5. Filgrastim 4 doses administered by heme/onc. Hb declined from 9.9 to 7.4, pt received 1 PRBC transfusion (11/24)  Hb level at 6.8 (12/4), received 1 unit of PRBC. Patient is advised to stay in the hospital however he is leaving [address] [place] [address]. Plan:  Patient will receive CBC and platelet blood work tomorrow and will follow-up with her PCP for further management    Blister (nonthermal) of left forearm, sequela  Assessment & Plan  Blisters of left upper extremity healing, no signs of infection, continue daily wound care. (HFpEF) heart failure with preserved ejection fraction [facility])  Assessment & Plan  Wt Readings from Last 3 Encounters:   12/04/23 70.8 kg (156 lb 1.4 oz)   09/07/23 74.6 kg (164 lb 6.4 oz)   08/30/23 73.3 kg (161 lb 9.6 oz)     Lab Results   Component Value Date    LVEF 65 11/21/2023     (H) 10/28/2023     (H) 09/29/2023     Echo 11/21/23: LVEF 65%.        Plan:  Patient is leaving [address] [place] [address] and will require follow-up with her PCP  Continue metoprolol 25 Mg twice daily  Discontinue amlodipine 10 Mg    Ambulatory dysfunction  Assessment & Plan  2/2 Impacted by chronic pain syndrome + prolonged hospital stay. Depression  Assessment & Plan  Continue Zoloft 100 daily and Seroquel hs   Had multiple family/palliative discussions. No SI/HI ideations. Palliative signed off, patient declines intervention and will follow-up outpatient. Chronic Superficial thrombophlebitis  Assessment & Plan  Right forearm soreness. RUE US: No acute or chronic deep vein thrombosis. 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 PO not severe enough to require renal dosing at this time, will continue to monitor and adjust dosing as needed. Eliquis 5 Mg    CKD (chronic kidney disease) stage 3, GFR 30-59 ml/min [facility])  Assessment & Plan  Lab Results   Component Value Date    EGFR 50 12/04/2023    EGFR 53 12/03/2023    EGFR 42 12/02/2023    CREATININE 1.11 12/04/2023    CREATININE 1.05 12/03/2023    CREATININE 1.28 12/02/2023     Baseline Cr between 0.75-1.1  Initial PO felt 2/2 prerenal azotemia 2/2 diuresis, reduced PO intake +/- ATN. Patient received 2 doses of Bumex IV 2 g as she had not been responding appropriately to IV 40 Lasix daily. Creatinine went up by 0.5 meeting criteria for an PO. Suspect most likely due to medications as a combination of prerenal and intrinsic. FENa was 0.2%, UA shows no casts or signs of infection, urine eosinophils 0%. Patient likely has prerenal PO from volume depletion. Received 2 L NS and 1 pRBC and cr went up by 0.07 still and Na and Cl went down, suspect that volume prevented further cr rise and free water excretion impaired by kidney function, allowing hyponatremia to increase. Suspect that now that nephrotoxic medications have been stopped she responded well to Lasix and creatinine downtrended. PO now resolved. Will be cautious about nephrotoxins and monitor as restarting EPOCH and ppx. Patient gets volume depleted and overloaded very easily. Especially from chemo. Plan:   Follow-up with the PCP for further management    Pain syndrome, chronic  Assessment & Plan  Home regimen: Oxycodone 5 mg BID, Tylenol 650 mg q8 prn. Gabapentin 600 mg TID. Medical marijuana. Lumbar radiculopathy and impaired ambulatory dysfunction secondary to pain. Has bowel regimen and is having bowel movements daily. Patient required additional pain management during previous cycle and has some additional pain from tunneled line placement    Plan:  Continue gabapentin 300 mg TID, oxycodone 5 mg TID for 5 days to which PCP will take over her pain management. Patient will be discharged on short course of pain meds, but will follow-up with PCP for further management. Benign essential hypertension  Assessment & Plan  Home regimen: Coreg 12.5 mg BID, Amlodipine 10 mg daily, and lisinopril 40 mg. Noted to become hypotensive after undergoing chemo cycles. Discontinued Coreg given chemo tx. Plan:  Continue lopressor 25 bid. Discontinue amlodipine and Coreg    Protein-calorie malnutrition ([address])  Assessment & Plan  Malnutrition Findings:   Adult Malnutrition type: Chronic illness  Adult Degree of Malnutrition: Unspecified protein calorie malnutrition  Malnutrition Characteristics: Inadequate energy, Other (comment) (stress of illness)                  360 Statement: Protein calorie malnutrition r/t inadequate intake as evidenced by >7 day period of poor intakes and stress of infection; currently treated with regular diet and nutrition supplements    BMI Findings: Body mass index is 30.48 kg/m².                  Medical Problems       Resolved Problems  Date Reviewed: 12/4/2023            Resolved    Hyperlipidemia 10/26/2023     Resolved by  Ame Piedra MD    Angioedema 11/4/2023     Resolved by  Saroj Coker MD    RML pneumonia 9/22/2023     Resolved by  Ame Piedra MD    PO (acute kidney injury) ([address]) 9/21/2023     Resolved by  Vi Myrick MD    Encephalopathy 9/21/2023     Resolved by  Vi Myrick MD    COPD without exacerbation ([address]) 9/26/2023     Resolved by  Donn Garcia MD    Generalized abdominal pain 11/17/2023     Resolved by  Ivette Rowell MD    Hyperkalemia 11/10/2023     Resolved by  Ivette Rowell MD    RTA (renal tubular acidosis) 11/30/2023     Resolved by  Kenny Grover MD          Admission Date:   Admission Orders (From admission, onward)       Ordered        09/13/23 1605  Inpatient Admission  Once                            Admitting Diagnosis: Angioedema, initial encounter [T78. 3XXA]    HPI: 59-year-old female with past medical history of hypertension, COPD, heart failure with preserved ejection fraction, hyperlipidemia, CKD stage III, tobacco use came into the hospital on September 13, 2023 due to dyspnea. Patient was found to have a mass in her oropharyngeal on CT, found to be large cell lymphoma. Procedures Performed: No orders of the defined types were placed in this encounter. Summary of Hospital Course: Patient initially presented to the ED with respiratory distress. Patient was complaining about gum throbbing/swallowing since September 3 with left-sided facial swelling. The pain has been getting severe and worsening which led to dyspnea due to swollen tongue. Upon arrival patient's SpO2 was low was 83%. Patient was intubated and was transferred to [facility] ICU afterwards. Patient received several doses of Decadron and Benadryl for her oral cavity edema. Patient was also found to have right middle lobe pneumonia and was treated with antibiotics for 5 days. Bronchoscopy was done and culture was positive for Candida suspecting oral stacy. Initially patient's left facial gum and edema was concern for oral floor/cervical abscesses.   CT of the neck showed large mass within the left involving multiple spaces which led to significant airway compromise. Biopsy was done of the mass and patient was found to have large cell lymphoma. During her stay in the hospital patient received 4 cycles of chemotherapy of R EPOCH. During her stay patient also developed thrombus in the right internal jugular and was started on Eliquis. Patient required pressure support ventilation (BiPAP). Stay in the hospital.  Patient also required PRBC several times due to anemia. Patient with episodes of desaturation which were managed via suctioning or aggressive treatment. Patient was trialed BiPAP while went on nights, which she was tolerating well most of the nights. Further attempts were made to convince the patient to go to [facility] however patient declined and wanted to go home after being discharged. Patient is being discharged on BiPAP while it went at night, and staying on mid flow oxygen requirement her trach mask. Patient was follow-up with PET scan and oncology outpatient. Pain regimen will be managed by her PCP. Patient's hemoglobin was low was 6.8, received 1 unit of PRBC. Patient also had desaturation events overnight. Patient was advised to stay in the hospital for further management and follow-ups on hemoglobin however patient is choosing to leave [address] [place] [address]. Patient with follow-up with her oncologist and PCP for further management. Significant Findings, Care, Treatment and Services Provided: Critical care service, hospitalist service, case management service, respiratory therapist service and oncology service. Complications: Pancytopenia, shortness of breath and chemo related adverse effects, left internal jugular thrombus, right middle lobe pneumonia. Condition at Discharge: [address]         Discharge instructions/Information to patient and family:   See after visit summary for information provided to patient and family.       Provisions for Follow-Up Care:  See after visit summary for information related to follow-up care and any pertinent home health orders. PCP: Mehran Jean Baptiste MD    Disposition: Home    Planned Readmission: Yes     Discharge Statement   I spent 90 minutes discharging the patient. This time was spent on the day of discharge. I had direct contact with the patient on the day of discharge. Additional documentation is required if more than 30 minutes were spent on discharge. Discharge Medications:  See after visit summary for reconciled discharge medications provided to patient and family.

## 2023-12-04 NOTE — ASSESSMENT & PLAN NOTE
Wt Readings from Last 3 Encounters:   12/04/23 70.8 kg (156 lb 1.4 oz)   09/07/23 74.6 kg (164 lb 6.4 oz)   08/30/23 73.3 kg (161 lb 9.6 oz)     Lab Results   Component Value Date    LVEF 65 11/21/2023     (H) 10/28/2023     (H) 09/29/2023     Echo 11/21/23: LVEF 65%.        Plan:  Maintain K > 4 and Mg > 2  Measure I/O

## 2023-12-05 ENCOUNTER — TELEPHONE (OUTPATIENT)
Dept: HEMATOLOGY ONCOLOGY | Facility: CLINIC | Age: 68
End: 2023-12-05

## 2023-12-05 ENCOUNTER — HOME CARE VISIT (OUTPATIENT)
Dept: HOME HEALTH SERVICES | Facility: HOME HEALTHCARE | Age: 68
End: 2023-12-05
Payer: COMMERCIAL

## 2023-12-05 VITALS
SYSTOLIC BLOOD PRESSURE: 128 MMHG | HEART RATE: 95 BPM | OXYGEN SATURATION: 93 % | RESPIRATION RATE: 20 BRPM | TEMPERATURE: 98.7 F | DIASTOLIC BLOOD PRESSURE: 66 MMHG

## 2023-12-05 DIAGNOSIS — M51.36 DISC DEGENERATION, LUMBAR: ICD-10-CM

## 2023-12-05 DIAGNOSIS — I10 HYPERTENSION: ICD-10-CM

## 2023-12-05 DIAGNOSIS — G89.4 PAIN SYNDROME, CHRONIC: Primary | Chronic | ICD-10-CM

## 2023-12-05 DIAGNOSIS — M54.50 LOW BACK PAIN, UNSPECIFIED BACK PAIN LATERALITY, UNSPECIFIED CHRONICITY, UNSPECIFIED WHETHER SCIATICA PRESENT: ICD-10-CM

## 2023-12-05 DIAGNOSIS — F32.A DEPRESSION, UNSPECIFIED DEPRESSION TYPE: Chronic | ICD-10-CM

## 2023-12-05 LAB
ABO GROUP BLD BPU: NORMAL
BPU ID: NORMAL
CROSSMATCH: NORMAL
MYCOBACTERIUM SPEC CULT: NORMAL
MYCOBACTERIUM SPEC CULT: NORMAL
RHODAMINE-AURAMINE STN SPEC: NORMAL
RHODAMINE-AURAMINE STN SPEC: NORMAL
UNIT DISPENSE STATUS: NORMAL
UNIT PRODUCT CODE: NORMAL
UNIT PRODUCT VOLUME: 350 ML
UNIT RH: NORMAL

## 2023-12-05 PROCEDURE — G0299 HHS/HOSPICE OF RN EA 15 MIN: HCPCS

## 2023-12-05 RX ORDER — OXYCODONE HYDROCHLORIDE 5 MG/1
5 TABLET ORAL 2 TIMES DAILY PRN
Qty: 60 TABLET | Refills: 0 | Status: SHIPPED | OUTPATIENT
Start: 2023-12-05

## 2023-12-05 RX ORDER — QUETIAPINE FUMARATE 50 MG/1
50 TABLET, FILM COATED ORAL 2 TIMES DAILY
Qty: 60 TABLET | Refills: 0 | Status: SHIPPED | OUTPATIENT
Start: 2023-12-05 | End: 2023-12-06 | Stop reason: SDUPTHER

## 2023-12-05 RX ORDER — LISINOPRIL 5 MG/1
TABLET ORAL
COMMUNITY
Start: 2023-10-20

## 2023-12-05 RX ORDER — OXYCODONE HYDROCHLORIDE AND ACETAMINOPHEN 5; 325 MG/1; MG/1
1 TABLET ORAL EVERY 8 HOURS PRN
Qty: 6 TABLET | Refills: 0 | Status: CANCELLED | OUTPATIENT
Start: 2023-12-05 | End: 2023-12-07

## 2023-12-05 NOTE — UTILIZATION REVIEW
NOTIFICATION OF ADMISSION DISCHARGE   This is a Notification of Discharge from [address] [place] [address]. Please be advised that this patient has been discharge from our facility. Below you will find the admission and discharge date and time including the patient’s disposition. UTILIZATION REVIEW CONTACT:  Mary Craig MA  Utilization   Network Utilization Review Department  Phone: [phone] x carefully listen to the prompts. All voicemails are confidential.  Email: [email or web address] [facility]     ADMISSION INFORMATION  PRESENTATION DATE: 9/13/2023  4:00 PM  OBERVATION ADMISSION DATE:   INPATIENT ADMISSION DATE: 9/13/23  4:00 PM   DISCHARGE DATE: 12/4/2023  2:51 PM   DISPOSITION:Left against medical advice or discontinued care    Network Utilization Review Department  ATTENTION: Please call with any questions or concerns to [phone] and carefully listen to the prompts so that you are directed to the right person. All voicemails are confidential.   For Discharge needs, contact Care Management DC Support Team at [phone] opt. 2  Send all requests for admission clinical reviews, approved or denied determinations and any other requests to dedicated fax number below belonging to the campus where the patient is receiving treatment.  List of dedicated fax numbers for the Facilities:  [place] DENIALS (Administrative/Medical Necessity) [phone]   DISCHARGE SUPPORT TEAM (Network) [phone]   [address] [place] [address] (Maternity/NICU/Pediatrics) [phone]   [address] [place] [address] [address] [facility]  [facility] [phone]

## 2023-12-05 NOTE — TELEPHONE ENCOUNTER
Pt requesting refill of PRN pain med. Current ongoing cancer treatments. Has virtual appt Thursday. Please advise. Thank you.

## 2023-12-05 NOTE — TELEPHONE ENCOUNTER
Patient Call    Who are you speaking with? Visiting Nurse     If it is not the patient, are they listed on an active communication consent form? Yes   What is the reason for this call? Ambrose Licea tried to draw, and wanting to know if she can draw through the pick line. They are asking for a new order in order to draw through the pick line. Does this require a call back? Yes   If a call back is required, please list best call back number [phone]   If a call back is required, advise that a message will be forwarded to their care team and someone will return their call as soon as possible. Did you relay this information to the patient?  Yes

## 2023-12-05 NOTE — TELEPHONE ENCOUNTER
Soft Intake Form   Patient Details   Pricila Baig     1955     Reason For Appointment   Who is Calling? Bella Mccall   If not patient, Name? NA   DID YOU CONFIRM INSURANCE WITH PATIENT? E verified, Routed to finance   Who is the Referring Doctor? Dr. 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 If yes, what is the date it was done? NA   Biopsy done at [facility]? If not, where was it done? NA   Was imaging done, and was it done at [address] [place] [address]? If not, where was it done? Yes   Have you been seen by another Oncologist?  If so, who and where (name of facility, city and state) No   For 2nd Opinions Only: Are you currently undergoing treatment, or are you scheduled to start treatment? If yes, name of facility, city and state  Yes-patient received 4 cycles of R EPOCH inpatient   For "History Of" only: Have you completed treatment? NA   Have you had Genetic Testing done in the past?  If so, advise to bring test results to their visit Yes   Record Gathering Information   Did you advise to have records faxed to [phone]? NA   Did you advise to have disks sent to the proper address with imaging? ("History of" Patients)  5 years of imaging for breast patients-Mammos, US etc NA   Scheduling Information   Did you send new patient paperwork? Email or mail? NA   What is the best call back number? (If the [facility] is calling, please use their number) [phone]    Miscellaneous Information      The patient is scheduled for her HFU appointment with Dr. Sina Sibley on 12/12 at [phone] in the [facility] office.

## 2023-12-05 NOTE — TELEPHONE ENCOUNTER
Oxycodone (IR) 5 mg sent to default pharmacy after reading D/C summary and consulting PDMP. Will need controlled substance agreement at Thursday's visit.       Ruben Jensen DO

## 2023-12-05 NOTE — TELEPHONE ENCOUNTER
I phoned the patient and left a VM message indicating that I was calling from [facility] Hematology/On[facility] to remind her of her upcoming appointment. I provided the appointment details (12/12, Dr. Nura Archibald office, 3474), the office address and location, and the [facility] number as the office contact.

## 2023-12-05 NOTE — TELEPHONE ENCOUNTER
Patient Call    Who are you speaking with? [facility] Visiting Nurse     If it is not the patient, are they listed on an active communication consent form? Yes   What is the reason for this call? Aquilino Wiggins is questing pick line, she thought it was supposed to be removed. She would like a call back advising what she needs to do   Does this require a call back? Yes   If a call back is required, please list best call back number [phone]   If a call back is required, advise that a message will be forwarded to their care team and someone will return their call as soon as possible. Did you relay this information to the patient?  Yes

## 2023-12-06 ENCOUNTER — HOME CARE VISIT (OUTPATIENT)
Dept: HOME HEALTH SERVICES | Facility: HOME HEALTHCARE | Age: 68
End: 2023-12-06
Payer: COMMERCIAL

## 2023-12-06 ENCOUNTER — TELEPHONE (OUTPATIENT)
Dept: CASE MANAGEMENT | Facility: HOSPITAL | Age: 68
End: 2023-12-06

## 2023-12-06 ENCOUNTER — TELEPHONE (OUTPATIENT)
Dept: HEMATOLOGY ONCOLOGY | Facility: CLINIC | Age: 68
End: 2023-12-06

## 2023-12-06 VITALS — DIASTOLIC BLOOD PRESSURE: 54 MMHG | HEART RATE: 103 BPM | SYSTOLIC BLOOD PRESSURE: 108 MMHG | OXYGEN SATURATION: 85 %

## 2023-12-06 DIAGNOSIS — F32.A DEPRESSION, UNSPECIFIED DEPRESSION TYPE: Chronic | ICD-10-CM

## 2023-12-06 DIAGNOSIS — I10 HYPERTENSION: ICD-10-CM

## 2023-12-06 PROCEDURE — G0152 HHCP-SERV OF OT,EA 15 MIN: HCPCS

## 2023-12-06 RX ORDER — QUETIAPINE FUMARATE 50 MG/1
50 TABLET, FILM COATED ORAL 2 TIMES DAILY
Qty: 60 TABLET | Refills: 2 | Status: SHIPPED | OUTPATIENT
Start: 2023-12-06 | End: 2024-03-05

## 2023-12-06 NOTE — TELEPHONE ENCOUNTER
CM received a VM from pt daughter, Bell Barnhart, requesting assistance on possible BLS transport for pt and her OP appointments. CM contacted [facility]. Was notified that pt would need a CM set up through [employer]. CM provided phone number: [phone] prompt 2. CM then transferred to their provider line. SHIRA spoke with a Michelle. [facility] requested CM reach out to [address] [place] [address] program with [facility] - [phone] or to utilize pt's . CM attempted to contact [address] [place] [address] and was notified this was non-medical or w/c Tete Johnna transport assistance only. SHIRA spoke with pt's , Vandana Hyatt. Vandana Hyatt did confirm that she can't assist with medical transportation and that this would be through the insurance. Carly aware SHIRA will attempt again. Carly asking about OP Palliative and/or CM services as an OP. Also asking about pt plan for the port  placement as the family was asking. Carly notified that OP Palliative referral can be made by pt's PCP. That pt will be assigned an Oncology SW once she goes to the OP Oncology appointment. SHIRA also informed her that [facility] can request an MSW through the [facility] to assist as well. SHIRA notified her that as per the physician the plan for the port and whether or not she will need this will be determined by the oncology office. Vandana Hyatt also asking about an order/script for LPN services in the evening due to pt care needs and to assist the family in her care overnight. SHIRA spoke with Dr. Carine Davis. Dr. Carine Davis did add additional needs in the "comment" section of the [facility] order. SHIRA did fax this to Vandana Hyatt and left VM informing her of same.   Dr. Carine Davis did confirm that the for the pt's port will be determined by the oncologist.

## 2023-12-06 NOTE — TELEPHONE ENCOUNTER
Left message for Mehran Andres with [facility]. Patient is consulted to see Dr. Bhavana Boyd on 12/12/23. Encouraged [facility] to reach out to provider from the hospital that put the line in or the patient's PCP for management of the PICC line for now. Direct line provided.

## 2023-12-06 NOTE — TELEPHONE ENCOUNTER
I called Yesenia Aponte in response to a referral that was received for patient to establish care with Hematology. Outreach was made to schedule a consultation. I left a voicemail explaining the reason for my call and advised patient to call [facility] at [phone]. Another attempt will be made to contact patient.

## 2023-12-07 ENCOUNTER — APPOINTMENT (OUTPATIENT)
Dept: LAB | Facility: HOSPITAL | Age: 68
End: 2023-12-07
Payer: COMMERCIAL

## 2023-12-07 ENCOUNTER — TELEMEDICINE (OUTPATIENT)
Dept: FAMILY MEDICINE CLINIC | Facility: OTHER | Age: 68
End: 2023-12-07
Payer: COMMERCIAL

## 2023-12-07 ENCOUNTER — HOME CARE VISIT (OUTPATIENT)
Dept: HOME HEALTH SERVICES | Facility: HOME HEALTHCARE | Age: 68
End: 2023-12-07
Payer: COMMERCIAL

## 2023-12-07 DIAGNOSIS — Z43.0 TRACHEOSTOMY CARE (HCC): Primary | ICD-10-CM

## 2023-12-07 DIAGNOSIS — Z99.11 DEPENDENT ON VENTILATOR (HCC): ICD-10-CM

## 2023-12-07 DIAGNOSIS — D61.810 PANCYTOPENIA DUE TO CHEMOTHERAPY (HCC): ICD-10-CM

## 2023-12-07 DIAGNOSIS — C85.80 LARGE CELL LYMPHOMA (HCC): ICD-10-CM

## 2023-12-07 DIAGNOSIS — R11.0 NAUSEA: ICD-10-CM

## 2023-12-07 LAB
DME PARACHUTE DELIVERY DATE ACTUAL: NORMAL
DME PARACHUTE DELIVERY DATE EXPECTED: NORMAL
DME PARACHUTE DELIVERY DATE REQUESTED: NORMAL
DME PARACHUTE ITEM DESCRIPTION: NORMAL
DME PARACHUTE ORDER STATUS: NORMAL
DME PARACHUTE SUPPLIER NAME: NORMAL
DME PARACHUTE SUPPLIER PHONE: NORMAL
ERYTHROCYTE [DISTWIDTH] IN BLOOD BY AUTOMATED COUNT: 16.4 % (ref 11.6–15.1)
HCT VFR BLD AUTO: 22.8 % (ref 34.8–46.1)
HGB BLD-MCNC: 7.2 G/DL (ref 11.5–15.4)
MCH RBC QN AUTO: 29 PG (ref 26.8–34.3)
MCHC RBC AUTO-ENTMCNC: 31.6 G/DL (ref 31.4–37.4)
MCV RBC AUTO: 92 FL (ref 82–98)
PLATELET # BLD AUTO: 128 THOUSANDS/UL (ref 149–390)
PMV BLD AUTO: 10.8 FL (ref 8.9–12.7)
RBC # BLD AUTO: 2.48 MILLION/UL (ref 3.81–5.12)
WBC # BLD AUTO: 2.73 THOUSAND/UL (ref 4.31–10.16)

## 2023-12-07 PROCEDURE — 85027 COMPLETE CBC AUTOMATED: CPT

## 2023-12-07 PROCEDURE — G0299 HHS/HOSPICE OF RN EA 15 MIN: HCPCS

## 2023-12-07 PROCEDURE — 99495 TRANSJ CARE MGMT MOD F2F 14D: CPT | Performed by: STUDENT IN AN ORGANIZED HEALTH CARE EDUCATION/TRAINING PROGRAM

## 2023-12-07 PROCEDURE — 36415 COLL VENOUS BLD VENIPUNCTURE: CPT

## 2023-12-07 RX ORDER — ONDANSETRON 4 MG/1
4 TABLET, ORALLY DISINTEGRATING ORAL EVERY 8 HOURS SCHEDULED
Qty: 60 TABLET | Refills: 0 | Status: SHIPPED | OUTPATIENT
Start: 2023-12-07

## 2023-12-07 NOTE — CASE COMMUNICATION
Continue Occupational Therapy 2w2 1w2 starting 12.6.23.  OT to include UB Ther ex and ADL training for Bathing and Toileting hygiene and Meal Managament using assistive devices with caregiver training.

## 2023-12-07 NOTE — PROGRESS NOTES
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 Ross and her daughter at length. I did inform them that I feel the safest place for Ms. Ross at this time would be the hospital.  She and her daughter do not want to go back to the hospital at this. As the patient was anemic with multiple electrolyte abnormalities throughout her inpatient stay I have ordered a repeat CBC to be drawn by home nursing. I have also referred her to ENT for tracheostomy evaluation and Pulmonology for follow-up of her acute respiratory failure. After reading the note from Ms. Shanks, the social wroker on this case I have also placed a referral to palliative care. I have attempted to contact Ms. Shanks to further coordinate care for Ms. Ross and await her return call. We will see Ms. Ross via telemedicine for follow-up in 1 week       Reason for visit is   Chief Complaint   Patient presents with    Virtual Regular Visit          Encounter provider Ivon Lamar MD    Provider located at [address] [place] [address]  [address] [facility] [id number]      Recent Visits  No visits were found meeting these conditions.   Showing recent visits within past 7 days and meeting all other requirements  Today's Visits  Date Type Provider Dept   12/07/23 Telemedicine Preeti Rivers MD [facility]   Showing today's visits and meeting all other requirements  Future Appointments  No visits were found meeting these conditions. Showing future appointments within next 150 days and meeting all other requirements       The patient was identified by name and date of birth. Ju Jacome was informed that this is a telemedicine visit and that the visit is being conducted through the [address] [place] [address] Now platform. She agrees to proceed. .  My office door was closed. No one else was in the room. She acknowledged consent and understanding of privacy and security of the video platform. The patient has agreed to participate and understands they can discontinue the visit at any time. Patient is aware this is a billable service. Subjective    Transitional Care Management Review:   Ju Jacome is a 77 y.o. female here for TCM follow up. During the TCM phone call patient stated:  TCM Call       Date and time call was made  8/31/2023  3:00 PM    Hospital care reviewed  Records reviewed    Patient was hospitialized at  [address] [place] [address]    Date of Admission  08/25/23    Date of discharge  08/30/23    Diagnosis  Sepsis ([address])    Disposition  Home health services    Were the patients medications reviewed and updated  Yes    Current Symptoms  None    Shortness of breath severity  Mild    Headache pain severity  Moderate    Headache pain onset  Sudden; Awakened form sleep    Headache location / laterality  Unilateral    Preorbital  Left    Frontal  Left    Temporal  Left    Occipital  Left    Parietal  Left    Pain Descriptors  Pressure    Episode pattern  Constant    Headache pain level  4    Headache cause / trigger  No known event          TCM Call       Modifying factors-feels better  Opiod Analgesics    Modifying factors-feels worse  Coughing;  Movement    Clinical progress  improving    Headache-pertinent history  Migraine headaches Post hospital issues  None    Should patient be enrolled in anticoag monitoring? No    Scheduled for follow up? Yes    Patients specialists  Pulmonlolgist    Did you obtain your prescribed medications  Yes    Do you need help managing your prescriptions or medications  No    Is transportation to your appointment needed  No    I have advised the patient to call PCP with any new or worsening symptoms  Fidencio Paz MA    Living Arrangements  Family members    Support System  None    Are you recieving any outpatient services  No    Are you recieving home care services  Yes    Types of home care services  Home PT; Home health aid    Are you using any community resources  No    Current waiver services  No    Have you fallen in the last 12 months  No    How many times  2 times     Interperter language line needed  No    Counseling  Patient    Counseling topics  Importance of RX compliance           Ms. Darcy Lambert initially presented to the ED 9/13/2023 with respiratory distress and complaints of gum throbbing/swallowing since September 3 with left-sided facial swelling. Upon arrival patient was hypoxic, she  was intubated and was transferred to [address] [place] [address] ICU where she received several doses of Decadron and Benadryl for her oral cavity edema. Patient was also found to have right middle lobe pneumonia and was treated with antibiotics for 5 days. Bronchoscopy was done and culture was positive for Candida suspecting oral stacy. Initially patient's left facial gum and edema was concern for oral floor/cervical abscesses. CT of the neck showed large mass within the left involving multiple spaces which led to significant airway compromise. Biopsy was done of the mass and patient was found to have large cell lymphoma. During her stay in the hospital patient received 4 cycles of chemotherapy of R EPOCH. During her stay patient also developed thrombus in the right internal jugular and was started on Eliquis. Patient required pressure support ventilation (BiPAP). She also required several PRBC transfusions during her stay most recently on day on discharge  for Hgb 6.8   Patient was trialed BiPAP at night which she tolerated well. LTAC was offered to the patient, but she declined. The patient ultimately left AMA on . She was set up with home nursing, speech therapy and OT. Since being home the patient's ventilator settings have remained consistent per her daughter who is caring for her. She is awake and alert. She is more appropriate and acting like herself per her daughter. She does complain of pain around the tracheostomy sight. Scheduled for oncology on , scheduled for PET/CT on .         Past Medical History:   Diagnosis Date    Anxiety     Depression     Fatty liver     Hyperlipidemia     Hypertension     Psychiatric disorder     Varicella        Past Surgical History:   Procedure Laterality Date    BREAST SURGERY Bilateral     Reduction Procedure    CARDIAC SURGERY       SECTION      x4    DILATION AND CURETTAGE OF UTERUS      ECTOPIC PREGNANCY SURGERY      IR GASTROSTOMY TUBE PLACEMENT  2023    IR TUNNELED CENTRAL LINE PLACEMENT  10/19/2023     [address] INCL FLUOR GDNCE DX W/CELL WASHG SPX N/A 2023    Procedure: BRONCHOSCOPY FLEXIBLE;  Surgeon: Erik Nunn MD;  Location: [facility];  Service: ENT    TRACHEOSTOMY N/A 2023    Procedure: TRACHEOSTOMY, biopsy of nasopharynx mass;  Surgeon: Erik Nunn MD;  [facility];  Service: ENT       Current Outpatient Medications   Medication Sig Dispense Refill    ondansetron (ZOFRAN-ODT) 4 mg disintegrating tablet Take 1 tablet (4 mg total) by mouth every 8 (eight) hours 60 tablet 0    acetaminophen (TYLENOL) 650 mg CR tablet Take 1 tablet (650 mg total) by mouth every 8 (eight) hours as needed for moderate pain 30 tablet 0    albuterol (PROVENTIL HFA,VENTOLIN HFA) 90 mcg/act inhaler Inhale 2 puffs every 6 (six) hours as needed for wheezing or shortness of breath 18 g 0    apixaban (ELIQUIS) 5 mg Take 1 tablet (5 mg total) by mouth 2 (two) times a day 60 tablet 0    busPIRone (BUSPAR) 30 MG tablet TAKE 1 TABLET BY MOUTH 3 TIMES A DAY. 270 tablet 0    Calcium Carb-Cholecalciferol (OSCAL-D) 500 mg-200 units per tablet Take 1 tablet by mouth 2 (two) times a day with meals      famotidine (PEPCID) 20 mg tablet Take 1 tablet (20 mg total) by mouth 2 (two) times a day 239 tablet 1    folic acid (FOLVITE) 1 mg tablet Take 1 tablet (1 mg total) by mouth daily Do not start before December 2, 2023. 30 tablet 0    formoterol (PERFOROMIST) 20 MCG/2ML nebulizer solution Take 2 mL (20 mcg total) by nebulization every 12 (twelve) hours 30 mL 0    gabapentin (NEURONTIN) 300 mg capsule Take 1 capsule (300 mg total) by mouth 3 (three) times a day 90 capsule 0    ipratropium (ATROVENT) 0.02 % nebulizer solution Take 2.5 mL (0.5 mg total) by nebulization every 6 (six) hours 300 mL 0    levalbuterol (XOPENEX) 1.25 mg/3 mL nebulizer solution Take 3 mL (1.25 mg total) by nebulization every 6 (six) hours 30 mL 0    lisinopril (ZESTRIL) 5 mg tablet       melatonin 3 mg Take 1 tablet (3 mg total) by mouth daily at bedtime 30 tablet 0    metoprolol tartrate (LOPRESSOR) 25 mg tablet Take 1 tablet (25 mg total) by mouth every 12 (twelve) hours 60 tablet 2    naloxone (NARCAN) 4 mg/0.1 mL nasal spray Administer 1 spray into a nostril. If no response after 2-3 minutes, give another dose in the other nostril using a new spray.  1 each 1    nicotine (NICODERM CQ) 7 mg/24hr TD 24 hr patch Place 1 patch on the skin over 24 hours every 24 hours 28 patch 0    oxyCODONE (Roxicodone) 5 immediate release tablet Take 1 tablet (5 mg total) by mouth 2 (two) times a day as needed for moderate pain or severe pain Max Daily Amount: 10 mg 60 tablet 0    QUEtiapine (SEROquel) 50 mg tablet Take 1 tablet (50 mg total) by mouth 2 (two) times a day 60 tablet 2 sertraline (ZOLOFT) 100 mg tablet 1 tablet (100 mg total) by Per PEG Tube route daily 30 tablet 0    sodium chloride, PF, 0.9 % Inject 5 mL into a catheter in a vein 2 (two) times a day as needed for line care 60 mL 0     No current facility-administered medications for this visit. Allergies   Allergen Reactions    Methyldopa Shortness Of Breath and Other (See Comments)     Aldomet       Review of Systems   Constitutional:  Positive for activity change and appetite change. Negative for chills, diaphoresis, fatigue and fever. Video Exam    There were no vitals filed for this visit. Physical Exam  Constitutional:       General: She is not in acute distress. Appearance: She is not toxic-appearing. HENT:      Head: Normocephalic and atraumatic. Nose: Nose normal.      Mouth/Throat:      Comments: Tracheostomy in place with trach collar on, Ventilator visible in the background  Pulmonary:      Comments: Speaking in short sentences without difficulty  Neurological:      Mental Status: She is alert.           Visit Time  Total Visit Duration: 20 minutes

## 2023-12-07 NOTE — CASE COMMUNICATION
Continue Occupational Therapy 2w2 1w2 starting 12.6.23.  OT to include UB Ther ex and ADL training for Bathing and Toileting hygiene and Meal Managament using assistive devices with caregiver training. Instructed patient and caregiver on OT plan of care and frequency with good understanding demonstrated by patient and caregiver.

## 2023-12-08 ENCOUNTER — TELEPHONE (OUTPATIENT)
Dept: CASE MANAGEMENT | Facility: HOSPITAL | Age: 68
End: 2023-12-08

## 2023-12-08 ENCOUNTER — HOME CARE VISIT (OUTPATIENT)
Dept: HOME HEALTH SERVICES | Facility: HOME HEALTHCARE | Age: 68
End: 2023-12-08
Payer: COMMERCIAL

## 2023-12-08 VITALS
SYSTOLIC BLOOD PRESSURE: 130 MMHG | HEART RATE: 126 BPM | DIASTOLIC BLOOD PRESSURE: 80 MMHG | TEMPERATURE: 98.7 F | OXYGEN SATURATION: 93 % | RESPIRATION RATE: 18 BRPM

## 2023-12-08 VITALS — HEART RATE: 130 BPM | OXYGEN SATURATION: 85 %

## 2023-12-08 VITALS
OXYGEN SATURATION: 95 % | HEART RATE: 99 BPM | DIASTOLIC BLOOD PRESSURE: 74 MMHG | SYSTOLIC BLOOD PRESSURE: 122 MMHG | TEMPERATURE: 97.5 F

## 2023-12-08 DIAGNOSIS — C85.80 LARGE CELL LYMPHOMA (HCC): Primary | ICD-10-CM

## 2023-12-08 DIAGNOSIS — Z45.2 PICC (PERIPHERALLY INSERTED CENTRAL CATHETER) IN PLACE: ICD-10-CM

## 2023-12-08 PROCEDURE — G0158 HHC OT ASSISTANT EA 15: HCPCS

## 2023-12-08 NOTE — PROGRESS NOTES
Home Care Communication received:  "Patient still has PICC line in R chest wall. SN has no ordered or supplies to change dressing it should be changed or removed today to avoid risk of infection. IR referral needs to be sent and planned with patient and family. "      Referral made for IR to remove PICC asap. Pt was seen yesterday for TCM by Dr Cedeno and has an upcoming appointment with Heme/Onc (Dr Reema Fung) on Tuesday 12/12/23.       Guilherme Cunningham DO

## 2023-12-08 NOTE — TELEPHONE ENCOUNTER
SHIRA received msg from  to contact pt's PCP - Dr. Yasemin Kirk. SHIRA contacted Dr. Светлана Mathews. She will need to call CM back. SHIRA did provide Dr. Светлана Mathews with direct contact number - [phone].

## 2023-12-11 ENCOUNTER — HOME CARE VISIT (OUTPATIENT)
Dept: HOME HEALTH SERVICES | Facility: HOME HEALTHCARE | Age: 68
End: 2023-12-11
Payer: COMMERCIAL

## 2023-12-11 ENCOUNTER — TELEPHONE (OUTPATIENT)
Dept: FAMILY MEDICINE CLINIC | Facility: OTHER | Age: 68
End: 2023-12-11

## 2023-12-11 LAB
FUNGUS SPEC CULT: NORMAL
FUNGUS SPEC CULT: NORMAL

## 2023-12-11 PROCEDURE — G0151 HHCP-SERV OF PT,EA 15 MIN: HCPCS

## 2023-12-11 PROCEDURE — G0299 HHS/HOSPICE OF RN EA 15 MIN: HCPCS

## 2023-12-11 NOTE — TELEPHONE ENCOUNTER
I called back and spoke with Fariha Garcia at length. She has been in contact with social work from [address] [place] [address] [employer]. I will attempt to contact them again today. I reiterated my concern that Ms. Melany Gonsalves would be safer at the hospital.  Ms. Fariha Garcia does understand our concern and is concerned as well. However, it is important to her and her mother that she get her PET/CT and is a candidate for chemotherapy. She was informed that Melany Gonsalves could not get a PET/CT while in the hospital and would be unable to undergo chemo if placed at an LTAC. She is also concerned about Casimiro Ross's safety but it is the patient's desire to stay home as long as possible to get a PET/CT. We discussed that Casimiro Agarwal is needing the BiPap more often during the day, not just at night. She is getting home nursing daily. Repeat CBC ordered. She is scheduled to see oncology tomorrow.

## 2023-12-11 NOTE — CASE COMMUNICATION
Daughter requested to reschedule [facility] next week due to Pt. feeling fatigued today.     Fax will be sent to  by scheduling to inform  that Pt will be seen beyond 7 day window  at [facility] request

## 2023-12-11 NOTE — TELEPHONE ENCOUNTER
Daughter called and said provider was going to get patient set up with a medical Ese Mendoza to get her to her dr appointments.  She would like to know the status of that    Please advise   Thank you

## 2023-12-12 ENCOUNTER — TELEPHONE (OUTPATIENT)
Age: 68
End: 2023-12-12

## 2023-12-12 ENCOUNTER — TELEPHONE (OUTPATIENT)
Dept: HEMATOLOGY ONCOLOGY | Facility: CLINIC | Age: 68
End: 2023-12-12

## 2023-12-12 ENCOUNTER — HOME CARE VISIT (OUTPATIENT)
Dept: HOME HEALTH SERVICES | Facility: HOME HEALTHCARE | Age: 68
End: 2023-12-12
Payer: COMMERCIAL

## 2023-12-12 VITALS — DIASTOLIC BLOOD PRESSURE: 74 MMHG | SYSTOLIC BLOOD PRESSURE: 121 MMHG | HEART RATE: 91 BPM | OXYGEN SATURATION: 95 %

## 2023-12-12 VITALS
HEART RATE: 121 BPM | DIASTOLIC BLOOD PRESSURE: 70 MMHG | HEIGHT: 60 IN | RESPIRATION RATE: 18 BRPM | OXYGEN SATURATION: 98 % | WEIGHT: 156.09 LBS | TEMPERATURE: 100 F | SYSTOLIC BLOOD PRESSURE: 142 MMHG | BODY MASS INDEX: 30.64 KG/M2

## 2023-12-12 VITALS
HEART RATE: 78 BPM | SYSTOLIC BLOOD PRESSURE: 128 MMHG | TEMPERATURE: 97.2 F | RESPIRATION RATE: 18 BRPM | DIASTOLIC BLOOD PRESSURE: 78 MMHG | OXYGEN SATURATION: 99 %

## 2023-12-12 DIAGNOSIS — Z45.2 PICC (PERIPHERALLY INSERTED CENTRAL CATHETER) IN PLACE: Primary | ICD-10-CM

## 2023-12-12 LAB
MYCOBACTERIUM SPEC CULT: NORMAL
MYCOBACTERIUM SPEC CULT: NORMAL
RHODAMINE-AURAMINE STN SPEC: NORMAL
RHODAMINE-AURAMINE STN SPEC: NORMAL

## 2023-12-12 NOTE — TELEPHONE ENCOUNTER
Appointment Change  Cancel, Reschedule, Change to Virtual      Who are you speaking with? Child   If it is not the patient, is the caller listed on the communication consent form? Yes   Which provider is the appointment scheduled with? Dr. Niurka Seo   When was the original appointment scheduled? Please list date and time 12/12/23 920   At which location is the appointment scheduled to take place? [facility]   Was the appointment rescheduled? Was the appointment changed from an in person visit to a virtual visit? If so, please list the details of the change. 12/19/23 9am [place]   What is the reason for the appointment change? transportation       Was [facility] transport scheduled? N/A   Does [facility] transport need to be scheduled for the new visit (if applicable) N/A   Does the patient need an infusion appointment rescheduled? N/A   Does the patient have an upcoming infusion appointment scheduled? If so, when? No   Is the patient undergoing chemotherapy? N/A   For appointments cancelled with less than 24 hours:  Was the no-show policy reviewed?  Yes

## 2023-12-12 NOTE — TELEPHONE ENCOUNTER
Safety checklist prior to [facility] will visit:    "Thank you for inviting us to your home. In order to ensure we arrive politely, and conduct ourselves safely in your home, please answer the following questions:"    1 - Where should we park when we arrive?    driveway    2 - Are there security doors, proctor, or fences that we will need a code for?       no    3 - Who else will be present at the visit to support you? Patient and daughter, Janie Albert       "We want to conduct the visit with you in the way that feels most comfortable for you. If we need to schedule around another person's time, please let us know. "    4 - Do you have any pets or other animals in your home?       no       "If you have a dog, please make sure they are crated, leashed, or placed in a separate room. Birds, reptiles, and other small animals like gerbils should be kept in a safe enclosure. This helps our team stay on track with your visit. We are here to see you, not your pets!"    5 - Do you have any firearms or other weapons in the home?       no       "Please store and lock all weapons prior to our arrival."    6 - Please have all medicines prescribed to you set up where they may be reviewed for safety.   Reviewed with patient  7 - If you use marijuana medically, or any recreational drugs, please store these out of sight prior to our arrival.   Reviewed with patient

## 2023-12-13 ENCOUNTER — TELEPHONE (OUTPATIENT)
Dept: CASE MANAGEMENT | Facility: HOSPITAL | Age: 68
End: 2023-12-13

## 2023-12-13 ENCOUNTER — IN HOME VISIT (OUTPATIENT)
Age: 68
End: 2023-12-13
Payer: COMMERCIAL

## 2023-12-13 ENCOUNTER — CLINICAL SUPPORT (OUTPATIENT)
Age: 68
End: 2023-12-13
Payer: COMMERCIAL

## 2023-12-13 VITALS — TEMPERATURE: 100.4 F | HEART RATE: 95 BPM | OXYGEN SATURATION: 98 % | RESPIRATION RATE: 16 BRPM

## 2023-12-13 DIAGNOSIS — F17.218 CIGARETTE NICOTINE DEPENDENCE WITH OTHER NICOTINE-INDUCED DISORDER: ICD-10-CM

## 2023-12-13 DIAGNOSIS — Z71.89 COMPLEX CARE COORDINATION: Primary | ICD-10-CM

## 2023-12-13 DIAGNOSIS — Z99.11 DEPENDENT ON VENTILATOR (HCC): ICD-10-CM

## 2023-12-13 DIAGNOSIS — F32.A DEPRESSION, UNSPECIFIED DEPRESSION TYPE: Chronic | ICD-10-CM

## 2023-12-13 DIAGNOSIS — D70.1 CHEMOTHERAPY INDUCED NEUTROPENIA: ICD-10-CM

## 2023-12-13 DIAGNOSIS — I10 HYPERTENSION: ICD-10-CM

## 2023-12-13 DIAGNOSIS — G89.4 PAIN SYNDROME, CHRONIC: Chronic | ICD-10-CM

## 2023-12-13 DIAGNOSIS — C85.80 LARGE CELL LYMPHOMA (HCC): ICD-10-CM

## 2023-12-13 DIAGNOSIS — M79.18 MYOFASCIAL PAIN SYNDROME: Chronic | ICD-10-CM

## 2023-12-13 DIAGNOSIS — T45.1X5A CHEMOTHERAPY INDUCED NEUTROPENIA: ICD-10-CM

## 2023-12-13 PROCEDURE — 99205 OFFICE O/P NEW HI 60 MIN: CPT | Performed by: FAMILY MEDICINE

## 2023-12-13 PROCEDURE — G0180 MD CERTIFICATION HHA PATIENT: HCPCS | Performed by: FAMILY MEDICINE

## 2023-12-13 PROCEDURE — 99497 ADVNCD CARE PLAN 30 MIN: CPT | Performed by: FAMILY MEDICINE

## 2023-12-13 PROCEDURE — 99024 POSTOP FOLLOW-UP VISIT: CPT

## 2023-12-13 RX ORDER — LANOLIN ALCOHOL/MO/W.PET/CERES
3 CREAM (GRAM) TOPICAL
Qty: 90 TABLET | Refills: 3 | Status: SHIPPED | OUTPATIENT
Start: 2023-12-13

## 2023-12-13 RX ORDER — FOLIC ACID 1 MG/1
1 TABLET ORAL DAILY
Qty: 90 TABLET | Refills: 3 | Status: SHIPPED | OUTPATIENT
Start: 2023-12-13

## 2023-12-13 RX ORDER — GABAPENTIN 300 MG/1
300 CAPSULE ORAL 3 TIMES DAILY
Qty: 270 CAPSULE | Refills: 3 | Status: SHIPPED | OUTPATIENT
Start: 2023-12-13

## 2023-12-13 RX ORDER — SERTRALINE HYDROCHLORIDE 100 MG/1
100 TABLET, FILM COATED ORAL DAILY
Qty: 90 TABLET | Refills: 3 | Status: SHIPPED | OUTPATIENT
Start: 2023-12-13

## 2023-12-13 RX ORDER — OXYCODONE HYDROCHLORIDE 5 MG/1
5 TABLET ORAL 2 TIMES DAILY PRN
Qty: 90 TABLET | Refills: 0 | Status: SHIPPED | OUTPATIENT
Start: 2023-12-13

## 2023-12-13 RX ORDER — QUETIAPINE FUMARATE 50 MG/1
50 TABLET, FILM COATED ORAL 2 TIMES DAILY
Qty: 180 TABLET | Refills: 3 | Status: SHIPPED | OUTPATIENT
Start: 2023-12-13

## 2023-12-13 NOTE — PROGRESS NOTES
Record of POLST discussion     I completed a POLST form with the patient and/or the patient's designated decision-maker. The pt is competent for medical decisions today. After discussing the problems addressed during this hospitalization, and prior to this encounter, the following limits and goals were set:    Part A)   DO  Attempt Resuscitation     Part B)  FULL CARES    Part C)  USE ANTIBIOTICS    Part D)  LONG TERM TUBE FEEDS (PEG is already in place)    Additional goals)  DO NOT use [place]. MUST USE [facility].     Contacts)   - daughter Gurwinder Pump will act as primary contact and decision-maker for this patient. Form was then signed by myself and pt. Additional parties in discussion included: idalmis Edmond of form was left in home. Counseling and Coordination of care   I spent 30 total minutes with the patient today, more than 50% of which was spent in counseling, coordination of care, and other face-to-face interactions and cares on the floor. Counseled patient/family regarding goals and means as recorded above.      Leatha Walsh MD

## 2023-12-13 NOTE — PROGRESS NOTES
Initial Consultation and Intake - [facility]  [facility] 77 y.o. female [id number]    Assessment & Plan  Problem List Items Addressed This Visit       Depression (Chronic)    Relevant Medications    melatonin 3 mg    nicotine (NICODERM CQ) 7 mg/24hr TD 24 hr patch    QUEtiapine (SEROquel) 50 mg tablet    sertraline (ZOLOFT) 100 mg tablet    Myofascial pain syndrome (Chronic)    Relevant Medications    gabapentin (NEURONTIN) 300 mg capsule    Nicotine dependence (Chronic)    Relevant Medications    nicotine (NICODERM CQ) 7 mg/24hr TD 24 hr patch    Pain syndrome, chronic (Chronic)    Relevant Medications    oxyCODONE (Roxicodone) 5 immediate release tablet    Chemotherapy induced neutropenia     Relevant Medications    folic acid (FOLVITE) 1 mg tablet    Large cell lymphoma (HCC)     Other Visit Diagnoses       Dependent on ventilator ([address])        Hypertension        Relevant Medications    metoprolol tartrate (LOPRESSOR) 25 mg tablet            Medications adjusted this encounter:  Requested Prescriptions     Signed Prescriptions Disp Refills    folic acid (FOLVITE) 1 mg tablet 90 tablet 3     Si tablet (1 mg total) by Per G Tube route daily    gabapentin (NEURONTIN) 300 mg capsule 270 capsule 3     Si capsule (300 mg total) by Per G Tube route 3 (three) times a day    melatonin 3 mg 90 tablet 3     Si tablet (3 mg total) by Per G Tube route daily at bedtime    metoprolol tartrate (LOPRESSOR) 25 mg tablet 180 tablet 3     Si tablet (25 mg total) by Per G Tube route every 12 (twelve) hours    nicotine (NICODERM CQ) 7 mg/24hr TD 24 hr patch 28 patch 0     Sig: Place 1 patch on the skin over 24 hours every 24 hours    oxyCODONE (Roxicodone) 5 immediate release tablet 90 tablet 0     Si tablet (5 mg total) by Per G Tube route 2 (two) times a day as needed for moderate pain or severe pain Max Daily Amount: 10 mg    QUEtiapine (SEROquel) 50 mg tablet 180 tablet 3     Si tablet (50 mg total) by Per G Tube route 2 (two) times a day    sertraline (ZOLOFT) 100 mg tablet 90 tablet 3     Si tablet (100 mg total) by Per PEG Tube route daily     No orders of the defined types were placed in this encounter. Medications Discontinued During This Encounter   Medication Reason    albuterol (PROVENTIL HFA,VENTOLIN HFA) 90 mcg/act inhaler     lisinopril (ZESTRIL) 5 mg tablet     nicotine (NICODERM CQ) 7 mg/24hr TD 24 hr patch Reorder    gabapentin (NEURONTIN) 300 mg capsule Reorder    melatonin 3 mg Reorder    folic acid (FOLVITE) 1 mg tablet Reorder    sertraline (ZOLOFT) 100 mg tablet Reorder    oxyCODONE (Roxicodone) 5 immediate release tablet Reorder    QUEtiapine (SEROquel) 50 mg tablet Reorder    metoprolol tartrate (LOPRESSOR) 25 mg tablet Reorder       PPS: 27      Lilia Ross was seen in their home today for symptoms and care planning related to above diagnoses. Above orders were sent electronically. I have reviewed the patient's controlled substance dispensing history in the Prescription Drug Monitoring Program in compliance with the [facility] regulations before prescribing any controlled substances. We appreciate the referral, and after consideration of our in-home service to this patient, the patient/family do wish to continue with [facility]. We will continue to follow with this patient through our home program.  If there are questions or concerns, please contact us through our clinic/answering service 24 hours a day, seven days a week. Keri Hopson MD  [facility]  A service provided by [facility]  [phone]        Visit Information    Accompanied By: Family member    Source of History: Self, Family member    History Limitations: trach in place without PMV    Contacts: daughter Angela Perez - [phone]    Chief Complaint  No chief complaint on file. Narrative and Interval History      Daya Harrison is a 77 y.o. female who presents as a referral from Dr. Cleve Archer of PCP for primary palliative diagnosis of DLBCL, confirmed on bx Sept 2023. Consultation is requested for: symptom management, emotional support in the setting of serious illness. The patient is seen in their home due to ambulatory difficulties related to their advanced illness. Since hospital discharge on 12/4, the family have felt disserved and neglected by the health care system. The pt's D/C was labeled 'AMA' for reasons related to a low hemoglobin. Her stay at [address] [place] [address] was 82 days. Flowing from the [facility] D/C, the pt was not offered instruction, support, education, nor supplies for her multiple lines, tubes and devices. The family still do not have an ENT referral, and she has not been able to complete a PET as rec'd by her Onc team for reasons largely related to the system's desire to charge her as an outpatient, rather than complete the study during her 3 mo hospital stay. Without a PET, her Med/Onc team have not offered therapy. Pt currently is home, on aggressive O2 support by trach, feeding exclusively through PEG, with a tunneled PICC in place. She has a hospital bed, bedside commode. She can assist with transfers and short-distance ambulation, and self -cares from the bed. She has not left the house since returning home, and she hopes not to leave, if at all possible, until she is stronger and more independent. We note that the pt did receive 4 cycles of induction EPOCH (last given 11/24-28) during hospital stay, and her severe anemia is likely an effect of cancer (at admission), or the treatment to fight the cancer (anemia on discharge). Thankfully, her PCP has run a new CBC that suggests that her anemia is improved since discharge, and not continued to deteriorate. This seems an effect of a tfs of pRBC given on day of D/C. On my visit this PM, there are family at bedside, and the pt defers interview.   She instructs her daughter to provide history. Nursing reports and charts are reviewed. In further consideration of the long history of the case with daughter Bell Barnhart, we pointedly discussed the racism against the patient that resulted in delayed diagnosis, and the structural reimbursement barriers that prevent ideal cares as the pt and the family would have wished: PET scans are routinely deferred to outpatient care in order to maximize reimbursement to health systems; LTACH and SNF do not routinely support patients in using chemotherapy; unemployment and FMLA benefits will not reward Linnette sufficiently to provide ideal cares in the home, and she will have to return to work next week in order to pay the family's bills. After consideration of these items, the family are clear that the patient wishes to have support enough to help her attempt the cares any other person in her situation would be offered. If hospital care is needed, they would prefer hospitalization at [facility]; the family feels very disserved by their time at [facility]. Family have a visit on 12/19 with Dr Eliazar Gutierrez, and daughter understands the need to make arrangement for ambulance transport to support the patient's respirations during her visit. From our most recent consult in hospital by our partner, Ms Rojas Rao in Sept 2023:   "Marva Garcia is a 69 y.o. female with PMH of HTN, HLD, fatty liver, tobacco use, chronic pain syndrome, and bipolar disorder who initially presented to the [facility] ED on 9/13/23 with respiratory distress. She had recently been discharged from hospital 3 weeks ago for pneumonia and respiratory failure. Upon further evaluation in ED, patient found to have hypoxia requiring high flow 60 L oxygen, tongue swelling, and severe angioedema. Patient was intubated in ED. CT neck/soft tissue revealed large enhancing soft tissue mass concerned for head and neck neoplasm. Since then, patient has received tracheostomy and biopsy.  Biopsy initial results are positive for malignancy with poorly differentiated carcinoma. Final biopsy and MRI pending. Radiation possibly planned for after MRI. Palliative care was consulted for goals of care. Upon my encounter today, patient seen and examined at bedside while respiratory therapy and RN in room. There is no family present during visit. She is resting in bed with trach in place and appears uncomfortable with brow furrowed. She is tearful and is unable to respond to questions due to trach, but does awaken to voice and open her eyes. Called daughter Jai Higuera to discuss goals of care. Introduced palliative care and our services. Discussed events leading up to hospitalization and daughter's understanding of her mother's current medical condition. She has good understanding, but has not yet received final biopsy results which are pending. Notified her that this writer will reach out to primary with her request for medical update. If results come back positive for malignancy, she believes her mother would wish to pursue cancer-directed therapies including chemotherapy or radiation. However, she hopes to be able to ask her mother when she is more awake and alert. She expresses that her mother did not wish to be intubated and likely would not have wanted trach, however chose to pursue it when there was risk of cardiac arrest due to her breathing and hypoxia. Her ultimate goal for her mother is for her to return home to living with her with nursing support. She does not believe her mother would want to go to rehab, but wishes to follow recommendations of treatment team. She reports that patient has two children: herself and son Guillermo Woo. Patient is . She is one of 5 siblings and has sister and cousin who are coming to visit today.  Explained [place] Act 169 in depth and that if patient loses competency and in the absence of advanced directives, herself and patient's son would share decision making equally. She does not have any further questions or concerns and will be visiting later today. She has palliative medicine phone number. "    Much later in that stay, on 11/8, our partner Joaquin Barba, [facility], documented:   "Goals:  Level 1 code status  Disease focused care with no limits placed. Goals are VERY clear and patient does not want to discuss any other course of action. When patient discusses going home she means with full care. She wishes to continue chemo and "get well."  Patient is requesting bullet pointed list with specific requirements to go home. 3.  Social support:  Very frustrated  No longer trusting of providers  "Loves" the nursing team in the ICU  Will live with daughter when discharged     Patient seen awake and alert in bed watching TV. She is obviously bothered by my presence. Says unless I am there to get her home she does not want to speak with me. Offered to allow her time to express her frustrations in a safe space, explaining that it is understandable that she is frustrated. She requests my title and asks what she needs to leave. Attempted to discuss her current needs and she asks "what if I disagree with everyone her, can I leave."  Discussed what it means to leave AMA and reminded her that currently she wouldn't "just be able to leave" because of the O2 support she needs. She requested a bullet point list of what she needs to leave and said otherwise she does not need to speak to me today."    Pertinent Palliative Care Domains    Physical Symptoms:chairbound    Psychological Symptoms:severe anxiety, limited insight, anger    Social Aspects: support system relies heavily on daughter to coordinate and effect cares. Can participate in self care, and mouth words to identify her needs     In-home services and caregivers: daughter, supported by V[facility]    DME inventory: hospital bed with half rails. Vent. Bedside commode. IV pole. Indwelling PICC.     Current vent setting since hospital discharge:   Daytime: 10L w/ FiO2 40% with cuff down  Nighttime: PS 4/8 40% with cuff up    Spiritual Aspects: Jehovah's witness.  A survivor of [employer]. Former schoolteacher.     Advance Directive and Living Will:    none on file  Power of :  shared by children Linnette dawit Monique   POLST:  completed today    Historical Data  Past Medical History:   Diagnosis Date    Anxiety     Depression     Fatty liver     Hyperlipidemia     Hypertension     Psychiatric disorder     Varicella      Past Surgical History:   Procedure Laterality Date    BREAST SURGERY Bilateral     Reduction Procedure    CARDIAC SURGERY       SECTION      x4    DILATION AND CURETTAGE OF UTERUS      ECTOPIC PREGNANCY SURGERY      IR GASTROSTOMY TUBE PLACEMENT  2023    IR TUNNELED CENTRAL LINE PLACEMENT  10/19/2023     [address] INCL FLUOR GDNCE DX W/CELL WASHG SPX N/A 2023    Procedure: Jared Six;  Surgeon: Jose Mckeon MD;  [facility];  Service: ENT    TRACHEOSTOMY N/A 2023    Procedure: TRACHEOSTOMY, biopsy of nasopharynx mass;  Surgeon: Jose Mckeon MD;  [facility];  Service: ENT     Social History     Socioeconomic History    Marital status:      Spouse name: Not on file    Number of children: Not on file    Years of education: Not on file    Highest education level: Not on file   Occupational History    Occupation: retired   Tobacco Use    Smoking status: Every Day     Current packs/day: 1.00     Types: Cigarettes    Smokeless tobacco: Never    Tobacco comments:     2 Black and milds per day   Vaping Use    Vaping status: Never Used   Substance and Sexual Activity    Alcohol use: Not Currently    Drug use: Yes     Types: Marijuana     Comment: for pain    Sexual activity: Not Currently     Partners: Male     Birth control/protection: None   Other Topics Concern    Not on file   Social History Narrative    Educational level-special ed/completed Master's Degree Social Determinants of Health     Financial Resource Strain: Low Risk  (1/5/2023)    Overall Financial Resource Strain (CARDIA)     Difficulty of Paying Living Expenses: Not hard at all   Food Insecurity: Not on file   Transportation Needs: No Transportation Needs (9/18/2023)    PRAPARE - Transportation     Lack of Transportation (Medical): No     Lack of Transportation (Non-Medical):  No   Physical Activity: Not on file   Stress: Not on file   Social Connections: Not on file   Intimate Partner Violence: Not on file   Housing Stability: Not on file     Family History   Problem Relation Age of Onset    Lung cancer Mother     Cirrhosis Father     Hypertension Sister     Bipolar disorder Sister     Heart disease Sister     Diabetes Family     Heart disease Family     Hypertension Family     Stroke Family     Thyroid disease Family      Allergies   Allergen Reactions    Methyldopa Shortness Of Breath and Other (See Comments)     Aldomet     Current Outpatient Medications   Medication Sig Dispense Refill    folic acid (FOLVITE) 1 mg tablet 1 tablet (1 mg total) by Per G Tube route daily 90 tablet 3    gabapentin (NEURONTIN) 300 mg capsule 1 capsule (300 mg total) by Per G Tube route 3 (three) times a day 270 capsule 3    melatonin 3 mg 1 tablet (3 mg total) by Per G Tube route daily at bedtime 90 tablet 3    metoprolol tartrate (LOPRESSOR) 25 mg tablet 1 tablet (25 mg total) by Per G Tube route every 12 (twelve) hours 180 tablet 3    nicotine (NICODERM CQ) 7 mg/24hr TD 24 hr patch Place 1 patch on the skin over 24 hours every 24 hours 28 patch 0    oxyCODONE (Roxicodone) 5 immediate release tablet 1 tablet (5 mg total) by Per G Tube route 2 (two) times a day as needed for moderate pain or severe pain Max Daily Amount: 10 mg 90 tablet 0    QUEtiapine (SEROquel) 50 mg tablet 1 tablet (50 mg total) by Per G Tube route 2 (two) times a day 180 tablet 3    sertraline (ZOLOFT) 100 mg tablet 1 tablet (100 mg total) by Per PEG Tube route daily 90 tablet 3    acetaminophen (TYLENOL) 650 mg CR tablet Take 1 tablet (650 mg total) by mouth every 8 (eight) hours as needed for moderate pain 30 tablet 0    apixaban (ELIQUIS) 5 mg Take 1 tablet (5 mg total) by mouth 2 (two) times a day 60 tablet 0    busPIRone (BUSPAR) 30 MG tablet TAKE 1 TABLET BY MOUTH 3 TIMES A DAY. 270 tablet 0    Calcium Carb-Cholecalciferol (OSCAL-D) 500 mg-200 units per tablet Take 1 tablet by mouth 2 (two) times a day with meals      famotidine (PEPCID) 20 mg tablet Take 1 tablet (20 mg total) by mouth 2 (two) times a day 180 tablet 1    formoterol (PERFOROMIST) 20 MCG/2ML nebulizer solution Take 2 mL (20 mcg total) by nebulization every 12 (twelve) hours 30 mL 0    ipratropium (ATROVENT) 0.02 % nebulizer solution Take 2.5 mL (0.5 mg total) by nebulization every 6 (six) hours 300 mL 0    levalbuterol (XOPENEX) 1.25 mg/3 mL nebulizer solution Take 3 mL (1.25 mg total) by nebulization every 6 (six) hours 30 mL 0    naloxone (NARCAN) 4 mg/0.1 mL nasal spray Administer 1 spray into a nostril. If no response after 2-3 minutes, give another dose in the other nostril using a new spray. 1 each 1    ondansetron (ZOFRAN-ODT) 4 mg disintegrating tablet Take 1 tablet (4 mg total) by mouth every 8 (eight) hours 60 tablet 0    oxygen gas Inhale 12 L/min continuous. pt on ventilator - o2 mask   Indications: Tracheostomy      sodium chloride, PF, 0.9 % Inject 5 mL into a catheter in a vein 2 (two) times a day as needed for line care 60 mL 0     No current facility-administered medications for this visit. Review of Systems   Unable to perform ROS: Intubated         Vital Signs  Pulse 95   Temp 100.4 °F (38 °C) (Temporal)   Resp 16   SpO2 98%     Physical Exam and Objective Data  Physical Exam  Constitutional:       General: She is not in acute distress. Appearance: She is ill-appearing. She is not toxic-appearing or diaphoretic.       Comments: frail   HENT:      Head: Normocephalic and atraumatic. Right Ear: External ear normal.      Left Ear: External ear normal.      Mouth/Throat:      Comments: Trach in place; pt edentulous  Eyes:      General:         Right eye: No discharge. Left eye: No discharge. Conjunctiva/sclera: Conjunctivae normal.      Pupils: Pupils are equal, round, and reactive to light. Neck:      Trachea: No tracheal deviation. Cardiovascular:      Rate and Rhythm: Regular rhythm. Tachycardia present. Pulmonary:      Effort: Pulmonary effort is normal. No respiratory distress. Breath sounds: No stridor. Abdominal:      General: There is no distension. Palpations: Abdomen is soft. Skin:     General: Skin is warm and dry. Findings: No erythema or rash. Neurological:      Cranial Nerves: Cranial nerve deficit (unable to speak d/t edentulous, presence of trach without PMV) present. Comments: Pt declines interview and exam   Psychiatric:      Comments: Deferred insight testing, per pt's request not to be retraumatized by doctors       Radiology and Laboratory:  I personally reviewed and interpreted the following results: none new, reviewed historical trends    I have spent a total time of 90+ minutes on 12/13/23 in caring for this patient including chart review; symptom pursuit; supportive listening; anticipatory guidance.  review and assessment of decisional apparatus and capacity, applicable legal codes and extant documents;

## 2023-12-13 NOTE — TELEPHONE ENCOUNTER
CM contacted [facility] to f/u on transportation request. CM contacted provider services. SHIRA spoke with [facility]. As per [facility] the family can contact [facility] at [phone] prompt 2. This will allow the family to arrange hospitals transport for appointments. SHIRA spoke with pt daughter, Marisol Chavez. Marisol Chavez reports she was able to get this information from the insurance and was able to schedule pt for her appointments. Marisol Chavez did request a VEST for the pt at home. SHIRA reached out to MISA -  with [facility]ina [facility]ansen. José Miguel to email CM information needed and order form that needs to be completed in order to have this arranged for the pt.

## 2023-12-14 ENCOUNTER — TELEPHONE (OUTPATIENT)
Dept: FAMILY MEDICINE CLINIC | Facility: OTHER | Age: 68
End: 2023-12-14

## 2023-12-14 ENCOUNTER — TELEPHONE (OUTPATIENT)
Dept: HEMATOLOGY ONCOLOGY | Facility: CLINIC | Age: 68
End: 2023-12-14

## 2023-12-14 ENCOUNTER — HOME CARE VISIT (OUTPATIENT)
Dept: HOME HEALTH SERVICES | Facility: HOME HEALTHCARE | Age: 68
End: 2023-12-14
Payer: COMMERCIAL

## 2023-12-14 DIAGNOSIS — C85.80 LARGE CELL LYMPHOMA (HCC): ICD-10-CM

## 2023-12-14 DIAGNOSIS — Z45.2 PERIPHERALLY INSERTED CENTRAL CATHETER (PICC) IN PLACE: Primary | ICD-10-CM

## 2023-12-14 PROCEDURE — G0299 HHS/HOSPICE OF RN EA 15 MIN: HCPCS

## 2023-12-14 PROCEDURE — G0152 HHCP-SERV OF OT,EA 15 MIN: HCPCS

## 2023-12-14 NOTE — TELEPHONE ENCOUNTER
Jorge Schneider, are you able to assist. Patient has only been seen by Dr. Pedro Luis Sumner inpatient.

## 2023-12-14 NOTE — TELEPHONE ENCOUNTER
Returned call to patient's daughter Sonya Light. She states patient had PICC line placed inpatient. She has been flushing this at home and [facility] has been coming twice a week to flush, draw labs and manage PICC. Sonya Light states an order was placed to remove the PICC line. Sonya Light would like patient to keep PICC line in as patient is a hard stick and [facility] has been unable to draw labs without it. Discussed with Dr. Pola Krishnamurthy. PICC line removal ordered by Dr. Dayna Galeazzi with family medicine. Provided patient's daughter with number to Dr. Kaleigh Arvizu's office to discuss further.

## 2023-12-14 NOTE — TELEPHONE ENCOUNTER
Daughter wants to keep the picc line in she would like to have an order to have someone come out and clean it?   They dont really want to have it removed since she is hard stick and she will need it for chemo (per daughter she may have 2 more rounds left or maybe more after PET scan is done)     Please advise   Thank you

## 2023-12-14 NOTE — TELEPHONE ENCOUNTER
Patient Call    Who are you speaking with? Litzy Norris   If it is not the patient, are they listed on an active communication consent form? Yes   What is the reason for this call? Linnette calling in regards to patient's orders to have the picc line removed. Rigo Norris would like to speak with the provider who ordered patient's picc line removal.  Patient is a hard stick and Rigo Norris would like to keep the picc line in. Rigo Norris states she has not been able to get in touch with the provider who requested the removal of the picc line and would like to speak with Dr. Daniela De La Cruz nurse in regards to this matter. Does this require a call back? Yes   If a call back is required, please list best call back number [phone]   If a call back is required, advise that a message will be forwarded to their care team and someone will return their call as soon as possible. Did you relay this information to the patient?  Yes

## 2023-12-15 ENCOUNTER — HOME CARE VISIT (OUTPATIENT)
Dept: HOME HEALTH SERVICES | Facility: HOME HEALTHCARE | Age: 68
End: 2023-12-15
Payer: COMMERCIAL

## 2023-12-15 VITALS
OXYGEN SATURATION: 96 % | TEMPERATURE: 97.3 F | SYSTOLIC BLOOD PRESSURE: 124 MMHG | HEART RATE: 89 BPM | DIASTOLIC BLOOD PRESSURE: 70 MMHG | RESPIRATION RATE: 8 BRPM

## 2023-12-15 PROCEDURE — G0153 HHCP-SVS OF S/L PATH,EA 15MN: HCPCS

## 2023-12-16 ENCOUNTER — HOME CARE VISIT (OUTPATIENT)
Dept: HOME HEALTH SERVICES | Facility: HOME HEALTHCARE | Age: 68
End: 2023-12-16
Payer: COMMERCIAL

## 2023-12-16 ENCOUNTER — TELEPHONE (OUTPATIENT)
Dept: OTHER | Facility: HOSPITAL | Age: 68
End: 2023-12-16

## 2023-12-17 NOTE — CASE COMMUNICATION
1830. [facility] No[facility] Patients daughter called into office regarding patient with increased difficulty with taking food PO. Daughter interested in restarting tube feedings in the near future and was looking for more info on how tube feedings would be initiated. Notified daughter that tube feedings would need to be ordered by MD and then obtained from [facility]. Daughter verbalized understanding and states she will call MD to further discuss.

## 2023-12-18 ENCOUNTER — HOME CARE VISIT (OUTPATIENT)
Dept: HOME HEALTH SERVICES | Facility: HOME HEALTHCARE | Age: 68
End: 2023-12-18
Payer: COMMERCIAL

## 2023-12-18 ENCOUNTER — LAB (OUTPATIENT)
Dept: LAB | Facility: CLINIC | Age: 68
End: 2023-12-18
Payer: COMMERCIAL

## 2023-12-18 ENCOUNTER — TELEPHONE (OUTPATIENT)
Dept: FAMILY MEDICINE CLINIC | Facility: CLINIC | Age: 68
End: 2023-12-18

## 2023-12-18 DIAGNOSIS — C85.80 LARGE CELL LYMPHOMA (HCC): ICD-10-CM

## 2023-12-18 LAB
ALBUMIN SERPL BCP-MCNC: 2.7 G/DL (ref 3.5–5)
ALP SERPL-CCNC: 79 U/L (ref 34–104)
ALT SERPL W P-5'-P-CCNC: 8 U/L (ref 7–52)
ANION GAP SERPL CALCULATED.3IONS-SCNC: 4 MMOL/L
ANISOCYTOSIS BLD QL SMEAR: PRESENT
AST SERPL W P-5'-P-CCNC: 9 U/L (ref 13–39)
BASOPHILS # BLD MANUAL: 0 THOUSAND/UL (ref 0–0.1)
BASOPHILS NFR MAR MANUAL: 0 % (ref 0–1)
BILIRUB SERPL-MCNC: 0.24 MG/DL (ref 0.2–1)
BUN SERPL-MCNC: 15 MG/DL (ref 5–25)
CALCIUM ALBUM COR SERPL-MCNC: 9.8 MG/DL (ref 8.3–10.1)
CALCIUM SERPL-MCNC: 8.8 MG/DL (ref 8.4–10.2)
CHLORIDE SERPL-SCNC: 104 MMOL/L (ref 96–108)
CO2 SERPL-SCNC: 30 MMOL/L (ref 21–32)
CREAT SERPL-MCNC: 1.24 MG/DL (ref 0.6–1.3)
EOSINOPHIL # BLD MANUAL: 0 THOUSAND/UL (ref 0–0.4)
EOSINOPHIL NFR BLD MANUAL: 0 % (ref 0–6)
ERYTHROCYTE [DISTWIDTH] IN BLOOD BY AUTOMATED COUNT: 17.4 % (ref 11.6–15.1)
GFR SERPL CREATININE-BSD FRML MDRD: 44 ML/MIN/1.73SQ M
GLUCOSE SERPL-MCNC: 96 MG/DL (ref 65–140)
HCT VFR BLD AUTO: 22.6 % (ref 34.8–46.1)
HGB BLD-MCNC: 6.9 G/DL (ref 11.5–15.4)
LYMPHOCYTES # BLD AUTO: 1.41 THOUSAND/UL (ref 0.6–4.47)
LYMPHOCYTES # BLD AUTO: 7 % (ref 14–44)
MCH RBC QN AUTO: 29.4 PG (ref 26.8–34.3)
MCHC RBC AUTO-ENTMCNC: 30.1 G/DL (ref 31.4–37.4)
MCV RBC AUTO: 98 FL (ref 82–98)
METAMYELOCYTES NFR BLD MANUAL: 2 % (ref 0–1)
MONOCYTES # BLD AUTO: 0.43 THOUSAND/UL (ref 0–1.22)
MONOCYTES NFR BLD: 4 % (ref 4–12)
NEUTROPHILS # BLD MANUAL: 8.8 THOUSAND/UL (ref 1.85–7.62)
NEUTS SEG NFR BLD AUTO: 81 % (ref 43–75)
PLATELET # BLD AUTO: 379 THOUSANDS/UL (ref 149–390)
PLATELET BLD QL SMEAR: ADEQUATE
PMV BLD AUTO: 9.3 FL (ref 8.9–12.7)
POIKILOCYTOSIS BLD QL SMEAR: PRESENT
POLYCHROMASIA BLD QL SMEAR: PRESENT
POTASSIUM SERPL-SCNC: 4.3 MMOL/L (ref 3.5–5.3)
PROT SERPL-MCNC: 4.8 G/DL (ref 6.4–8.4)
RBC # BLD AUTO: 2.31 MILLION/UL (ref 3.81–5.12)
RBC MORPH BLD: PRESENT
SODIUM SERPL-SCNC: 138 MMOL/L (ref 135–147)
VARIANT LYMPHS # BLD AUTO: 6 %
WBC # BLD AUTO: 10.86 THOUSAND/UL (ref 4.31–10.16)

## 2023-12-18 PROCEDURE — G0299 HHS/HOSPICE OF RN EA 15 MIN: HCPCS

## 2023-12-18 PROCEDURE — 36415 COLL VENOUS BLD VENIPUNCTURE: CPT

## 2023-12-18 PROCEDURE — 80053 COMPREHEN METABOLIC PANEL: CPT

## 2023-12-18 PROCEDURE — 85027 COMPLETE CBC AUTOMATED: CPT

## 2023-12-18 PROCEDURE — 85007 BL SMEAR W/DIFF WBC COUNT: CPT

## 2023-12-18 NOTE — PROGRESS NOTES
Palliative in-home Assessment of Need    [facility] completed an assessment of need with patient and her daughter Linnette in their home located in [place], [place]    Relationship status:   Duration of relationship:    Name of significant other:  Children and Ages:  2 adult children a son Sarabjit, and daughter Linnette who she lives with.   Pets: no  Other important family information:   Linnette reports patient had a delay in receiving cancer dx due to being misdiagnosed by multiple providers. Symptoms persisted and patient was not referred to appropriate specialist. Family and patient are extremely frustrated with how illness likely progressed while patients concerns and symptoms were dismissed. Patient left AMA from [facility] due to patient reports of poor care coordination/communication. Patient will not return to [facility] if hospitalization is required. Patient would only like to be transported to [facility]    Living situation (where and whom): Patient resides with her daughter Linnette in Linnette's home in [place]    Patient's primary caregiver:  Linnette and her brother Sarabjit, visiting nurse 2-3x per week, Sarabjit's wife Poppy is LPN and assists as well  Any limitations of caregiver: Linnette works full time as a teacher. Sarabjit will be covering day shift and Linnette will cover overnights  Environmental concerns or barriers:  patient has complex medical needs and requires 24/7 care   history: no  Employment history/source of income:  SS Income  Disability:  retired  Concerns regarding literacy:  no  Spirituality/ Hindu:  Anabaptist  Patient's strengths, social supports, and resources: patients children are dedicated caregivers and strong advocates for their mother  Cultural information:  patient is   Mental Health current or previous: anxiety, depression, bipolar  Substance use or history:  past marijuana use  Sleep: is in and out of sleep due to bed bound status  Exercise: PT 1-2x  "per week  Diet/nutrition: decreased appetite. Needs all food pureed due to trach  Durable Medical Equipment needs: oxygen, complex medical care I.e. central line, has trach, ventilator, hospital bed  Transportation: requires special medical transport  Financial concerns: none at this time  Advanced Directive: Patient completed Polst during visit  Other medical or social work providers involved: visiting nurse, PT/OT therapist, Hem ONC, PCP, speech therapist   Patient/caregiver current level of coping:  Understanding: Patient is struggling with complex medical needs and trache. Linnette reports patient is understandably depressed but \"not despairing\"  Patient/family concerns and areas of need: coordination of care, options for cancer treatment  Patient's interests:     I have spent 90 minutes with Patient and family today in which greater than 50% of this time was spent in counseling/coordination of care.    *All questions may not be answered due to constraints.  Follow-up discussions may need to occur       "

## 2023-12-18 NOTE — RESULT ENCOUNTER NOTE
Spoke to daughter shared recommendation. See encounter telephone call with daughter plan for heme onc tomorrow as patient is on a vent.

## 2023-12-18 NOTE — TELEPHONE ENCOUNTER
I am not sure if this is ongoing or if new order. Please review and place order if appropriate. Thank you

## 2023-12-18 NOTE — TELEPHONE ENCOUNTER
I spoke to the patient daughter. This patient will be seeing Heme onc tomorrow. Patient is on a Vent and needs medical transportation. Hematology had told daughter under 7 for HGB is the area of concern but he daughter is waiting for transport to take mom to appointment tomorrow at 9. I told her I would let the proivders at  know this. We also discusssed any change in condition to call 911 for ER services if needed. ALEXA I did share the  provider recommendation.

## 2023-12-18 NOTE — RESULT ENCOUNTER NOTE
I spoke to Linnette and she will be taking her mom who requires medical transport due to ventilator. She will see Heme onc tomorrow. ALEXA

## 2023-12-18 NOTE — CASE COMMUNICATION
HH ST Eval made 12.15.23    Impression.  Observed mild.moderate oral stage dysphagia characterized by inability to masticate slice of lunch meat and cheese.  Possible mild pharyngeal stage dysphagia when eating large bites of regular diet without compensatory techniques characterized by coughing.   Observed speech to be WFL in conversational speech when speaking  on vent.   Hospital records reported that Pt had good voicing with PMV.    Oriented x 3.  Observed language to be WFL in conversational speech.     Mild [place].  She reported that she has mild pain in her L ear at a level 3.    reportedly aware.   Vision.  Cataract in L eye.  Vision in R eye reportedly WFL.  and she can read newspaper print      Rec.   Sp tx. 1x wk x 4wk  Pt and daughter in agreement  ( 4 visits 12.15.23 to 1. 6.24)  Cont informal eval  Practice assignments given  Cont on  Dysphagia level 3 di et--soft, moist  foods  with compensatory techniques which include chew well, chin tuck before swallow, sip every 2 to 3 bites as needed      offer cheese curls vs popcorn.   trial open faced sandwich w 1 slice meat and or cheese cut up into small pieces      trail pb and jelly sandwich starting w very little pb.    Cont on thin liquids w compensatory techniques which include small sips slowly w chin tuck before swallow   Cont aspiratio n precautions. Contact Dr if change in either temp and or lung condition  Mouth wash at least 2x per day w toothettes  Moniter weight-- 165 lbs at present.           record daily weights and record foods/liquids and amounts eaten   Contact  about sore gums

## 2023-12-18 NOTE — CASE COMMUNICATION
This message is informative only.  No action required.     HH ST [place] made 12.15.23     Impression.  Observed mild.moderate oral stage dysphagia characterized by inability to masticate slice of lunch meat and cheese.  Possible mild pharyngeal stage dysphagia when eating large bites of regular diet without compensatory techniques characterized by coughing.   Observed speech to be WFL in conversational speech when speaking  on vent.   Hos pital records reported that Pt had good voicing with PMV.   Oriented x 3.  Observed language to be WFL in conversational speech.     Mild [place].  She reported that she has mild pain in her L ear at a level 3.    reportedly aware.   Vision.  Cataract in L eye.  Vision in R eye reportedly WFL.  and she can read newspaper print      Rec.   Sp tx. 1x wk x 4wk  Pt and daughter in agreement  ( 4 visits 12.15.23 to 1. 6.24)  Cont informal argenis l  Practice assignments given  Cont on  Dysphagia level 3 diet--soft, moist  foods  with compensatory techniques which include chew well, chin tuck before swallow, sip every 2 to 3 bites as needed      offer cheese curls vs popcorn.   trial open faced sandwich w 1 slice meat and or cheese cut up into small pieces      trail pb and jelly sandwich starting w very little pb.    Cont on thin liquids w compensatory techniques which include s mall sips slowly w chin tuck before swallow   Cont aspiration precautions. Contact Dr if change in either temp and or lung condition  Mouth wash at least 2x per day w toothettes  Moniter weight-- 165 lbs at present.           record daily weights and record foods/liquids and amounts eaten   Contact  about sore gums

## 2023-12-19 ENCOUNTER — OFFICE VISIT (OUTPATIENT)
Dept: HEMATOLOGY ONCOLOGY | Facility: CLINIC | Age: 68
End: 2023-12-19
Payer: COMMERCIAL

## 2023-12-19 ENCOUNTER — TELEPHONE (OUTPATIENT)
Dept: HEMATOLOGY ONCOLOGY | Facility: CLINIC | Age: 68
End: 2023-12-19

## 2023-12-19 VITALS
OXYGEN SATURATION: 98 % | RESPIRATION RATE: 16 BRPM | DIASTOLIC BLOOD PRESSURE: 70 MMHG | TEMPERATURE: 97.3 F | HEART RATE: 84 BPM | SYSTOLIC BLOOD PRESSURE: 112 MMHG

## 2023-12-19 VITALS — BODY MASS INDEX: 30.48 KG/M2 | HEIGHT: 60 IN

## 2023-12-19 DIAGNOSIS — D61.810 PANCYTOPENIA DUE TO CHEMOTHERAPY (HCC): ICD-10-CM

## 2023-12-19 DIAGNOSIS — J96.01 ACUTE RESPIRATORY FAILURE WITH HYPOXIA (HCC): ICD-10-CM

## 2023-12-19 DIAGNOSIS — C85.80 LARGE CELL LYMPHOMA (HCC): Primary | ICD-10-CM

## 2023-12-19 PROCEDURE — 99214 OFFICE O/P EST MOD 30 MIN: CPT | Performed by: INTERNAL MEDICINE

## 2023-12-19 RX ORDER — LEVALBUTEROL INHALATION SOLUTION 1.25 MG/3ML
1.25 SOLUTION RESPIRATORY (INHALATION) EVERY 6 HOURS
Qty: 360 ML | Refills: 0 | Status: SHIPPED | OUTPATIENT
Start: 2023-12-19 | End: 2024-01-18

## 2023-12-19 RX ORDER — SODIUM CHLORIDE 9 MG/ML
20 INJECTION, SOLUTION INTRAVENOUS ONCE
Status: CANCELLED | OUTPATIENT
Start: 2023-12-20

## 2023-12-19 NOTE — PROGRESS NOTES
Hematology Outpatient Follow - Up Note  Lilia Ross 68 y.o. 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    She had multiple comorbidities, treated as inpatient for a total of 4 cycles of R-EPOCH, now she is on the vent support with the tracheostomy, her performance status on ECOG 3    She is not candidate for additional chemotherapy    The family is persuading PET scan that would be done this Friday    She is anemic with hemoglobin of 7 however because of the send Oteri lifestyle there is no need for admission to the hospital manage this as an outpatient with 1 unit of packed RBC    PICC line care    Multiple comorbidities that could prohibit additional treatment    PT/OT at home    CBC on weekly basis        Labs and imaging studies are reviewed by ordering provider once results are available. If there are findings that need immediate attention, you will be contacted when results available.   Discussing results and the implication on your healthcare is best discussed in person at your follow-up visit.       HPI:    68-year-old -American female who stayed in the hospital for almost 60 days around September 2023 because of large B-cell lymphoma of the oropharynx with local invasion and extension status post R-EPOCH for 4 cycles with renal adjustment    She had respiratory failure she is on tracheostomy and oxygen support about 8 to 12 L, she had chronic kidney disease, hypercalcemia    She is deconditioned  Interval History:        Previous Treatment:         Test Results:    Imaging: XR chest 1 view portable    Result Date: 11/25/2023  Narrative: CHEST INDICATION:   shortness of breath with desaturation.. COMPARISON: CXR and chest CT 11/22/2023. EXAM PERFORMED/VIEWS:  XR CHEST PORTABLE. FINDINGS: Tracheostomy. Right port in lower  SVC. Mild cardiomegaly. Moderate pulmonary edema and emphysema. No definite effusion. No pneumothorax. Upper abdomen normal. Bones normal for age.     Impression: Moderate pulmonary edema. Pneumonia not excluded in the appropriate clinical setting. Workstation performed: [id number]     CT chest wo contrast    Result Date: 11/22/2023  Narrative: CT CHEST WITHOUT IV CONTRAST INDICATION:   Dyspnea, chronic, chest wall or pleura disease suspected shortness of breath. COMPARISON: Compared with 11/3/2023 TECHNIQUE: CT examination of the chest was performed without intravenous contrast. Multiplanar 2D reformatted images were created from the source data. This examination, like all CT scans performed in the [facility], was performed utilizing techniques to minimize radiation dose exposure, including the use of iterative reconstruction and automated exposure control. Radiation dose length product (DLP) for this visit:  318 mGy-cm FINDINGS: LUNGS: Moderate to severe emphysematous changes. Patchy parenchymal opacity in the lingula and left lower lobe posteriorly. Decreased left lung volume.  There is no tracheal or endobronchial lesion. Tracheotomy tube seen. PLEURA:  Unremarkable. HEART/GREAT VESSELS: Heart is unremarkable for patient's age.  No thoracic aortic aneurysm. Dilated main pulmonary artery measuring 4.3 cm. MEDIASTINUM AND PRASANTH: Subcentimeter right paratracheal and precarinal lymph nodes. CHEST WALL AND LOWER NECK:  Unremarkable. VISUALIZED STRUCTURES IN THE UPPER ABDOMEN: Gastrostomy tube in place with contrast opacified balloon in the lumen. Simple right renal cyst. OSSEOUS STRUCTURES: Lytic area in the T12 vertebral body along the inferior endplate is unchanged. Lucent focus in the left glenoid unchanged.     Impression: Breathing motion artifacts and emphysematous changes limit evaluation. Infiltrates in the lingula and left lower lobe suggested. Improved from prior study. Workstation  performed: [id number]     XR chest portable ICU    Result Date: 11/22/2023  Narrative: CHEST INDICATION:   hypoxia. COMPARISON: Chest x-ray November 20, 2023 EXAM PERFORMED/VIEWS:  XR CHEST PORTABLE ICU FINDINGS: Tracheostomy tube is in satisfactory position. Right central venous catheter terminates in the superior vena cava. Cardiomegaly is demonstrated. Stable interstitial opacities are seen which could reflect edema or interstitial infiltrate with patchy opacities in the lower lobes, worse on the left. Bony structures are unchanged     Impression: Stable exam. Workstation performed: [id number]     XR chest portable ICU    Result Date: 11/21/2023  Narrative: CHEST INDICATION:  Status post trach and vent at night, SOB. COMPARISON: 11/10/2023, CT chest 11/3/2023 EXAM PERFORMED/VIEWS:  XR CHEST PORTABLE ICU FINDINGS: -Tracheostomy tube projects in satisfactory radiographic position. -Right IJ central line tip terminates in the region of the SVC. Cardiomediastinal silhouette appears stable. Diffuse interstitial and groundglass opacities throughout the lungs, unchanged. No pneumothorax. Osseous structures appear within normal limits for patient age.     Impression: Diffuse interstitial and groundglass opacities throughout the lungs, unchanged. Findings could be infectious/inflammatory and/or secondary to edema. Workstation performed: [id number]     Echo follow up/limited w/ contrast if indicated    Result Date: 11/21/2023  Narrative: •  Left Ventricle: Left ventricular cavity size is normal. Wall thickness is severely increased. The left ventricular ejection fraction is 65%. Systolic function is normal. Wall motion is normal. Diastolic function is abnormal.  Left atrial filling pressure is elevated. •  Right Ventricle: Right ventricular cavity size is normal. Systolic function is normal. •  Atrial Septum: No patent foramen ovale detected, confirmed at rest and with provcation using agitated saline contrast. •   Mitral Valve: There is moderate regurgitation. There is systolic anterior motion of the anterior leaflet without late peaking gradient at rest. •  Pulmonic Valve: There is mild regurgitation.       Labs:   Lab Results   Component Value Date    WBC 10.86 (H) 12/18/2023    HGB 6.9 (LL) 12/18/2023    HCT 22.6 (L) 12/18/2023    MCV 98 12/18/2023     12/18/2023     Lab Results   Component Value Date     09/30/2015    K 4.3 12/18/2023     12/18/2023    CO2 30 12/18/2023    ANIONGAP 8 09/30/2015    BUN 15 12/18/2023    CREATININE 1.24 12/18/2023    GLUCOSE 138 11/26/2023    GLUF 98 07/12/2018    CALCIUM 8.8 12/18/2023    CORRECTEDCA 9.8 12/18/2023    AST 9 (L) 12/18/2023    ALT 8 12/18/2023    ALKPHOS 79 12/18/2023    PROT 8.1 09/30/2015    BILITOT 0.24 09/30/2015    EGFR 44 12/18/2023       Lab Results   Component Value Date    IRON 17 (L) 10/23/2023    TIBC 165 (L) 10/23/2023    FERRITIN 519 (H) 10/23/2023       Lab Results   Component Value Date    FYSPNWIY26 761 10/23/2023         ROS: Review of Systems   Constitutional:  Positive for appetite change. Negative for chills, diaphoresis, fatigue and unexpected weight change.   HENT:   Negative for mouth sores, nosebleeds, sore throat, trouble swallowing and voice change.    Eyes:  Negative for eye problems and icterus.   Respiratory:  Negative for chest tightness, cough, hemoptysis and wheezing.    Cardiovascular:  Negative for chest pain, leg swelling and palpitations.   Gastrointestinal:  Negative for abdominal distention, abdominal pain, blood in stool, constipation, diarrhea, nausea and vomiting.   Endocrine: Negative for hot flashes.   Genitourinary:  Positive for bladder incontinence. Negative for difficulty urinating, dyspareunia, dysuria and frequency.    Musculoskeletal:  Positive for arthralgias and neck pain. Negative for back pain, gait problem and neck stiffness.   Skin:  Negative for itching and rash.   Neurological:  Negative for  dizziness, gait problem, headaches, numbness, seizures and speech difficulty.   Hematological:  Negative for adenopathy. Does not bruise/bleed easily.   Psychiatric/Behavioral:  Positive for decreased concentration. Negative for depression, sleep disturbance and suicidal ideas. The patient is not nervous/anxious.           Current Medications: Reviewed  Allergies: Reviewed  PMH/FH/SH:  Reviewed      Physical Exam:    Body surface area is 1.68 meters squared.    Wt Readings from Last 3 Encounters:   12/04/23 70.8 kg (156 lb 1.4 oz)   09/07/23 74.6 kg (164 lb 6.4 oz)   08/30/23 73.3 kg (161 lb 9.6 oz)        Temp Readings from Last 3 Encounters:   12/18/23 (!) 97.3 °F (36.3 °C)   12/14/23 (!) 97.3 °F (36.3 °C)   12/13/23 100.4 °F (38 °C) (Temporal)        BP Readings from Last 3 Encounters:   12/18/23 112/70   12/14/23 124/70   12/11/23 121/74         Pulse Readings from Last 3 Encounters:   12/18/23 84   12/14/23 89   12/13/23 95        Physical Exam  Vitals reviewed.   Constitutional:       General: She is not in acute distress.     Appearance: She is well-developed. She is not diaphoretic.      Interventions: She is intubated.   HENT:      Head: Normocephalic and atraumatic.   Eyes:      Conjunctiva/sclera: Conjunctivae normal.   Neck:      Trachea: No tracheal deviation.   Cardiovascular:      Rate and Rhythm: Normal rate and regular rhythm.      Heart sounds: No murmur heard.     No friction rub. No gallop.   Pulmonary:      Effort: Pulmonary effort is normal. No respiratory distress. She is intubated.      Breath sounds: Normal breath sounds. No wheezing or rales.   Chest:      Chest wall: No tenderness.   Abdominal:      General: There is no distension.      Palpations: Abdomen is soft.      Tenderness: There is no abdominal tenderness.   Musculoskeletal:      Cervical back: Normal range of motion and neck supple.      Right lower leg: No edema.      Left lower leg: No edema.   Lymphadenopathy:      Cervical:  No cervical adenopathy.   Skin:     General: Skin is warm and dry.      Coloration: Skin is not pale.      Findings: No erythema.   Neurological:      Mental Status: She is alert, oriented to person, place, and time and easily aroused.   Psychiatric:         Behavior: Behavior normal.         Thought Content: Thought content normal.         Judgment: Judgment normal.         ECOG:3    Goals and Barriers:  Current Goal: Minimize effects of disease.   Barriers: None.      Patient's Capacity to Self Care:  Patient is able to self care.    Code Status: [unfilled]

## 2023-12-19 NOTE — TELEPHONE ENCOUNTER
Patient Call    Who are you speaking with? Child    If it is not the patient, are they listed on an active communication consent form? Yes   What is the reason for this call? Patient will be [facility] with oxygen, using ventilator   Does this require a call back? No   If a call back is required, please list best call back number [phone]    If a call back is required, advise that a message will be forwarded to their care team and someone will return their call as soon as possible.   Did you relay this information to the patient? Yes

## 2023-12-19 NOTE — TELEPHONE ENCOUNTER
Pt scheduled for RBC transfusion, 12/21 8:00 AM BE infusion. Pt son and daughter notified in office.

## 2023-12-20 ENCOUNTER — HOME CARE VISIT (OUTPATIENT)
Dept: HOME HEALTH SERVICES | Facility: HOME HEALTHCARE | Age: 68
End: 2023-12-20
Payer: COMMERCIAL

## 2023-12-20 ENCOUNTER — APPOINTMENT (OUTPATIENT)
Dept: LAB | Facility: CLINIC | Age: 68
End: 2023-12-20
Payer: COMMERCIAL

## 2023-12-20 ENCOUNTER — TELEPHONE (OUTPATIENT)
Dept: HEMATOLOGY ONCOLOGY | Facility: CLINIC | Age: 68
End: 2023-12-20

## 2023-12-20 ENCOUNTER — TELEPHONE (OUTPATIENT)
Dept: FAMILY MEDICINE CLINIC | Facility: OTHER | Age: 68
End: 2023-12-20

## 2023-12-20 DIAGNOSIS — J96.01 ACUTE RESPIRATORY FAILURE WITH HYPOXIA (HCC): ICD-10-CM

## 2023-12-20 DIAGNOSIS — D61.810 PANCYTOPENIA DUE TO CHEMOTHERAPY (HCC): ICD-10-CM

## 2023-12-20 DIAGNOSIS — C85.80 LARGE CELL LYMPHOMA (HCC): ICD-10-CM

## 2023-12-20 LAB
ABO GROUP BLD: NORMAL
BLD GP AB SCN SERPL QL: NEGATIVE
RH BLD: POSITIVE
SPECIMEN EXPIRATION DATE: NORMAL

## 2023-12-20 PROCEDURE — 36415 COLL VENOUS BLD VENIPUNCTURE: CPT

## 2023-12-20 PROCEDURE — 86901 BLOOD TYPING SEROLOGIC RH(D): CPT

## 2023-12-20 PROCEDURE — 86850 RBC ANTIBODY SCREEN: CPT

## 2023-12-20 PROCEDURE — 86900 BLOOD TYPING SEROLOGIC ABO: CPT

## 2023-12-20 PROCEDURE — G0152 HHCP-SERV OF OT,EA 15 MIN: HCPCS

## 2023-12-20 PROCEDURE — G0299 HHS/HOSPICE OF RN EA 15 MIN: HCPCS

## 2023-12-20 RX ORDER — FORMOTEROL FUMARATE DIHYDRATE 20 UG/2ML
20 SOLUTION RESPIRATORY (INHALATION)
Qty: 360 ML | Refills: 1 | Status: SHIPPED | OUTPATIENT
Start: 2023-12-20 | End: 2024-06-17

## 2023-12-20 RX ORDER — SODIUM CHLORIDE 9 MG/ML
20 INJECTION, SOLUTION INTRAVENOUS ONCE
OUTPATIENT
Start: 2023-12-20

## 2023-12-20 NOTE — TELEPHONE ENCOUNTER
Attempted to call patient's son Sarabjit castillo to make him aware that his FMLA paper work has been completed, faxed & uploaded into his mother's chart.    The original copies are going to be at the [place] location.    Will attempt to call again later today prior to EOD 12/20/23.        **Called again @3:07 PM-phone line still busy.

## 2023-12-21 ENCOUNTER — TELEPHONE (OUTPATIENT)
Dept: HEMATOLOGY ONCOLOGY | Facility: CLINIC | Age: 68
End: 2023-12-21

## 2023-12-21 ENCOUNTER — HOSPITAL ENCOUNTER (OUTPATIENT)
Dept: INFUSION CENTER | Facility: HOSPITAL | Age: 68
Discharge: HOME | End: 2023-12-21
Payer: COMMERCIAL

## 2023-12-21 ENCOUNTER — HOSPITAL ENCOUNTER (EMERGENCY)
Facility: HOSPITAL | Age: 68
Discharge: HOME | End: 2023-12-21
Attending: EMERGENCY MEDICINE
Payer: COMMERCIAL

## 2023-12-21 VITALS
RESPIRATION RATE: 16 BRPM | DIASTOLIC BLOOD PRESSURE: 98 MMHG | OXYGEN SATURATION: 100 % | TEMPERATURE: 98.3 F | HEART RATE: 100 BPM | SYSTOLIC BLOOD PRESSURE: 169 MMHG

## 2023-12-21 VITALS
TEMPERATURE: 97.3 F | RESPIRATION RATE: 18 BRPM | SYSTOLIC BLOOD PRESSURE: 132 MMHG | DIASTOLIC BLOOD PRESSURE: 84 MMHG | OXYGEN SATURATION: 91 % | HEART RATE: 87 BPM

## 2023-12-21 VITALS
SYSTOLIC BLOOD PRESSURE: 190 MMHG | HEART RATE: 102 BPM | TEMPERATURE: 98.3 F | OXYGEN SATURATION: 90 % | DIASTOLIC BLOOD PRESSURE: 110 MMHG | RESPIRATION RATE: 20 BRPM

## 2023-12-21 DIAGNOSIS — C83.30 DLBCL (DIFFUSE LARGE B CELL LYMPHOMA) (HCC): ICD-10-CM

## 2023-12-21 DIAGNOSIS — J96.10 CHRONIC RESPIRATORY FAILURE (HCC): ICD-10-CM

## 2023-12-21 DIAGNOSIS — D64.9 ANEMIA: Primary | ICD-10-CM

## 2023-12-21 DIAGNOSIS — C85.80 LARGE CELL LYMPHOMA (HCC): Primary | ICD-10-CM

## 2023-12-21 DIAGNOSIS — D61.810 PANCYTOPENIA DUE TO CHEMOTHERAPY (HCC): ICD-10-CM

## 2023-12-21 LAB
ANION GAP SERPL CALCULATED.3IONS-SCNC: 7 MMOL/L
BASOPHILS # BLD AUTO: 0.06 THOUSANDS/ÂΜL (ref 0–0.1)
BASOPHILS NFR BLD AUTO: 1 % (ref 0–1)
BUN SERPL-MCNC: 17 MG/DL (ref 5–25)
CALCIUM SERPL-MCNC: 9.5 MG/DL (ref 8.4–10.2)
CHLORIDE SERPL-SCNC: 105 MMOL/L (ref 96–108)
CO2 SERPL-SCNC: 28 MMOL/L (ref 21–32)
CREAT SERPL-MCNC: 1.28 MG/DL (ref 0.6–1.3)
EOSINOPHIL # BLD AUTO: 0.13 THOUSAND/ÂΜL (ref 0–0.61)
EOSINOPHIL NFR BLD AUTO: 1 % (ref 0–6)
ERYTHROCYTE [DISTWIDTH] IN BLOOD BY AUTOMATED COUNT: 17.8 % (ref 11.6–15.1)
GFR SERPL CREATININE-BSD FRML MDRD: 42 ML/MIN/1.73SQ M
GLUCOSE SERPL-MCNC: 102 MG/DL (ref 65–140)
HCT VFR BLD AUTO: 24.6 % (ref 34.8–46.1)
HGB BLD-MCNC: 7.6 G/DL (ref 11.5–15.4)
IMM GRANULOCYTES # BLD AUTO: 0.17 THOUSAND/UL (ref 0–0.2)
IMM GRANULOCYTES NFR BLD AUTO: 2 % (ref 0–2)
LYMPHOCYTES # BLD AUTO: 1.12 THOUSANDS/ÂΜL (ref 0.6–4.47)
LYMPHOCYTES NFR BLD AUTO: 11 % (ref 14–44)
MCH RBC QN AUTO: 29.3 PG (ref 26.8–34.3)
MCHC RBC AUTO-ENTMCNC: 30.9 G/DL (ref 31.4–37.4)
MCV RBC AUTO: 95 FL (ref 82–98)
MONOCYTES # BLD AUTO: 1.2 THOUSAND/ÂΜL (ref 0.17–1.22)
MONOCYTES NFR BLD AUTO: 12 % (ref 4–12)
NEUTROPHILS # BLD AUTO: 7.53 THOUSANDS/ÂΜL (ref 1.85–7.62)
NEUTS SEG NFR BLD AUTO: 73 % (ref 43–75)
NRBC BLD AUTO-RTO: 0 /100 WBCS
PLATELET # BLD AUTO: 442 THOUSANDS/UL (ref 149–390)
PMV BLD AUTO: 9 FL (ref 8.9–12.7)
POTASSIUM SERPL-SCNC: 4.2 MMOL/L (ref 3.5–5.3)
RBC # BLD AUTO: 2.59 MILLION/UL (ref 3.81–5.12)
SODIUM SERPL-SCNC: 140 MMOL/L (ref 135–147)
WBC # BLD AUTO: 10.21 THOUSAND/UL (ref 4.31–10.16)

## 2023-12-21 PROCEDURE — 36430 TRANSFUSION BLD/BLD COMPNT: CPT

## 2023-12-21 PROCEDURE — 85025 COMPLETE CBC W/AUTO DIFF WBC: CPT

## 2023-12-21 PROCEDURE — 99284 EMERGENCY DEPT VISIT MOD MDM: CPT

## 2023-12-21 PROCEDURE — 36415 COLL VENOUS BLD VENIPUNCTURE: CPT

## 2023-12-21 PROCEDURE — 86920 COMPATIBILITY TEST SPIN: CPT

## 2023-12-21 PROCEDURE — 80048 BASIC METABOLIC PNL TOTAL CA: CPT

## 2023-12-21 PROCEDURE — 99284 EMERGENCY DEPT VISIT MOD MDM: CPT | Performed by: EMERGENCY MEDICINE

## 2023-12-21 PROCEDURE — 96523 IRRIG DRUG DELIVERY DEVICE: CPT

## 2023-12-21 PROCEDURE — 94002 VENT MGMT INPAT INIT DAY: CPT

## 2023-12-21 PROCEDURE — P9040 RBC LEUKOREDUCED IRRADIATED: HCPCS

## 2023-12-21 NOTE — RESPIRATORY THERAPY NOTE
"Resp care   12/21/23 1012   Respiratory Assessment   Resp Comments Called to infusion center to see pt on home vent for desaturation. Sat 96 when RT arrived. PT on home concentrator but limit of flow was reached.External 02 via tank placed if needed. Vent had been alarming \"rebreathing\". I contacted pts home care company reguarding this and noted that the exhalation valve was missing in the home circuit. Exhalation valve was found in home equipment bag and placed. At this time the pts home concentrator stopped working. 02 was delivered through 02 tank previously set up. Vent now reading obstruction. I was unable to sx pt due to infusion unit not having sx available. Per family, pt was sx x2 when still in ambulance when dropped of at [facility]. Pt was bagged and RN called MD to get order to move to ED. Pt was placed on C1 vent in Er with compatable setting to home vent. Pt was sx for very thick yellow secretions in ED. Pt was unable to be treated in Infusion center for respiratory needs. Concentrator was turned on and now working. Circuit may have needed to be reset. Will make sure vent and concentrator functional before discharge. Late note. I believe this concentrator is not the patients but property of [facility].        "

## 2023-12-21 NOTE — ED PROVIDER NOTES
History  Chief Complaint   Patient presents with    Medical Problem     Pt was in infusion center awaiting unit of blood to be transfused. Pt has a trach in place and did not have proper equipment to be monitored in infusion center while receiving blood. Pt sent down to ED to receive blood products while around proper trach equipment      This is a 68-year-old female who has known diffuse large B-cell lymphoma which involves the oropharynx and is complicated by respiratory failure which required tracheostomy placement.  Patient has been having ongoing anemia secondary to her blood dyscrasia which requires intermittent infusions.  She was scheduled for a transfusion of packed red blood cells today at outpatient office however when she arrived, it was noted that the patient's transport vent was not functioning properly.  The infusion center did not have the proper equipment to transition her to another ventilator.  Therefore she was transported to the emergency department for management of her ventilator settings while troubleshooting her home vent.  She denies any new acute symptoms and states that the rest of her cancer related symptoms are at their current baseline and being evaluated by Dr. Crespo of hematology oncology.  Patient does have multiple other comorbidities, she does have a PICC line for access.        Prior to Admission Medications   Prescriptions Last Dose Informant Patient Reported? Taking?   Calcium Carb-Cholecalciferol (OSCAL-D) 500 mg-200 units per tablet  Self Yes No   Sig: Take 1 tablet by mouth 2 (two) times a day with meals   QUEtiapine (SEROquel) 50 mg tablet   No No   Si tablet (50 mg total) by Per G Tube route 2 (two) times a day   acetaminophen (TYLENOL) 650 mg CR tablet  Self No No   Sig: Take 1 tablet (650 mg total) by mouth every 8 (eight) hours as needed for moderate pain   apixaban (ELIQUIS) 5 mg   No No   Sig: Take 1 tablet (5 mg total) by mouth 2 (two) times a day   budesonide  (PULMICORT) 90 MCG/ACT inhaler   No No   Sig: Inhale 1 puff 2 (two) times a day Rinse mouth after use.   busPIRone (BUSPAR) 30 MG tablet   No No   Sig: TAKE 1 TABLET BY MOUTH 3 TIMES A DAY.   famotidine (PEPCID) 20 mg tablet   No No   Sig: Take 1 tablet (20 mg total) by mouth 2 (two) times a day   folic acid (FOLVITE) 1 mg tablet   No No   Si tablet (1 mg total) by Per G Tube route daily   formoterol (PERFOROMIST) 20 MCG/2ML nebulizer solution   No No   Sig: Take 2 mL (20 mcg total) by nebulization every 12 (twelve) hours   gabapentin (NEURONTIN) 300 mg capsule   No No   Si capsule (300 mg total) by Per G Tube route 3 (three) times a day   ipratropium (ATROVENT) 0.02 % nebulizer solution   No No   Sig: Take 2.5 mL (0.5 mg total) by nebulization every 6 (six) hours   levalbuterol (XOPENEX) 1.25 mg/3 mL nebulizer solution   No No   Sig: Take 3 mL (1.25 mg total) by nebulization every 6 (six) hours   melatonin 3 mg   No No   Si tablet (3 mg total) by Per G Tube route daily at bedtime   metoprolol tartrate (LOPRESSOR) 25 mg tablet   No No   Si tablet (25 mg total) by Per G Tube route every 12 (twelve) hours   naloxone (NARCAN) 4 mg/0.1 mL nasal spray   No No   Sig: Administer 1 spray into a nostril. If no response after 2-3 minutes, give another dose in the other nostril using a new spray.   nicotine (NICODERM CQ) 7 mg/24hr TD 24 hr patch   No No   Sig: Place 1 patch on the skin over 24 hours every 24 hours   ondansetron (ZOFRAN-ODT) 4 mg disintegrating tablet   No No   Sig: Take 1 tablet (4 mg total) by mouth every 8 (eight) hours   oxyCODONE (Roxicodone) 5 immediate release tablet   No No   Si tablet (5 mg total) by Per G Tube route 2 (two) times a day as needed for moderate pain or severe pain Max Daily Amount: 10 mg   oxygen gas   Yes No   Sig: Inhale 12 L/min continuous. pt on ventilator - o2 mask   Indications: Tracheostomy   sertraline (ZOLOFT) 100 mg tablet   No No   Si tablet (100 mg  total) by Per PEG Tube route daily   sodium chloride, PF, 0.9 %   No No   Sig: Inject 5 mL into a catheter in a vein 2 (two) times a day as needed for line care      Facility-Administered Medications: None       Past Medical History:   Diagnosis Date    Anxiety     Depression     Fatty liver     Hyperlipidemia     Hypertension     Psychiatric disorder     Varicella        Past Surgical History:   Procedure Laterality Date    BREAST SURGERY Bilateral     Reduction Procedure    CARDIAC SURGERY       SECTION      x4    DILATION AND CURETTAGE OF UTERUS      ECTOPIC PREGNANCY SURGERY      IR GASTROSTOMY TUBE PLACEMENT  2023    IR TUNNELED CENTRAL LINE PLACEMENT  10/19/2023    KY BRNCHSC INCL FLUOR GDNCE DX W/CELL WASHG SPX N/A 2023    Procedure: BRONCHOSCOPY FLEXIBLE;  Surgeon: Joshua Guerra MD;  Location: [facility];  Service: ENT    TRACHEOSTOMY N/A 2023    Procedure: TRACHEOSTOMY, biopsy of nasopharynx mass;  Surgeon: Joshua Guerra MD;  Location[facility];  Service: ENT       Family History   Problem Relation Age of Onset    Lung cancer Mother     Cirrhosis Father     Hypertension Sister     Bipolar disorder Sister     Heart disease Sister     Diabetes Family     Heart disease Family     Hypertension Family     Stroke Family     Thyroid disease Family      I have reviewed and agree with the history as documented.    E-Cigarette/Vaping    E-Cigarette Use Never User      E-Cigarette/Vaping Substances    Nicotine No     THC No     CBD No     Flavoring No     Other No     Unknown No      Social History     Tobacco Use    Smoking status: Every Day     Current packs/day: 1.00     Types: Cigarettes    Smokeless tobacco: Never    Tobacco comments:     2 Black and milds per day   Vaping Use    Vaping status: Never Used   Substance Use Topics    Alcohol use: Not Currently    Drug use: Yes     Types: Marijuana     Comment: for pain        Review of Systems   Unable to perform ROS: Intubated        Physical Exam  ED Triage Vitals [12/21/23 0955]   Temperature Pulse Respirations Blood Pressure SpO2   98 °F (36.7 °C) 98 14 147/88 100 %      Temp Source Heart Rate Source Patient Position - Orthostatic VS BP Location FiO2 (%)   Oral Monitor Lying Right arm --      Pain Score       No Pain             Orthostatic Vital Signs  Vitals:    12/21/23 1127 12/21/23 1201 12/21/23 1300 12/21/23 1315   BP: (!) 170/103 159/96 165/99 169/98   Pulse: 95 98 98 100   Patient Position - Orthostatic VS:           Physical Exam  Vitals and nursing note reviewed.   Constitutional:       General: She is not in acute distress.     Appearance: She is well-developed. She is ill-appearing (Chronically).   HENT:      Head: Normocephalic and atraumatic.      Right Ear: External ear normal.      Left Ear: External ear normal.      Nose: Nose normal. No congestion or rhinorrhea.      Mouth/Throat:      Mouth: Mucous membranes are moist.      Pharynx: Oropharynx is clear. No oropharyngeal exudate or posterior oropharyngeal erythema.   Eyes:      General: No scleral icterus.     Extraocular Movements: Extraocular movements intact.      Conjunctiva/sclera: Conjunctivae normal.      Pupils: Pupils are equal, round, and reactive to light.   Cardiovascular:      Rate and Rhythm: Normal rate and regular rhythm.      Pulses: Normal pulses.      Heart sounds: Normal heart sounds. No murmur heard.  Pulmonary:      Breath sounds: No wheezing or rhonchi.      Comments: Tracheostomy in place, no surrounding erythema, no underlying fluctuance, no drainage from tracheostomy site.  Suctioning well.  Patient currently on our ventilator.  No respiratory distress.  Abdominal:      General: Abdomen is flat. There is no distension.      Palpations: Abdomen is soft.      Tenderness: There is no abdominal tenderness. There is no guarding.   Musculoskeletal:         General: No swelling.      Cervical back: Neck supple. No rigidity.      Right lower leg: No  edema.      Left lower leg: No edema.   Lymphadenopathy:      Cervical: No cervical adenopathy.   Skin:     General: Skin is warm and dry.      Capillary Refill: Capillary refill takes 2 to 3 seconds.      Coloration: Skin is pale. Skin is not jaundiced.      Findings: No rash.   Neurological:      Mental Status: She is alert.         ED Medications  Medications - No data to display    Diagnostic Studies  Results Reviewed       Procedure Component Value Units Date/Time    Basic metabolic panel [[id number]] Collected: 12/21/23 1016    Lab Status: Final result Specimen: Blood from Central Venous Line Updated: 12/21/23 1051     Sodium 140 mmol/L      Potassium 4.2 mmol/L      Chloride 105 mmol/L      CO2 28 mmol/L      ANION GAP 7 mmol/L      BUN 17 mg/dL      Creatinine 1.28 mg/dL      Glucose 102 mg/dL      Calcium 9.5 mg/dL      eGFR 42 ml/min/1.73sq m     Narrative:      National Kidney Disease Foundation guidelines for Chronic Kidney Disease (CKD):     Stage 1 with normal or high GFR (GFR > 90 mL/min/1.73 square meters)    Stage 2 Mild CKD (GFR = 60-89 mL/min/1.73 square meters)    Stage 3A Moderate CKD (GFR = 45-59 mL/min/1.73 square meters)    Stage 3B Moderate CKD (GFR = 30-44 mL/min/1.73 square meters)    Stage 4 Severe CKD (GFR = 15-29 mL/min/1.73 square meters)    Stage 5 End Stage CKD (GFR <15 mL/min/1.73 square meters)  Note: GFR calculation is accurate only with a steady state creatinine    CBC and differential [[id number]]  (Abnormal) Collected: 12/21/23 1016    Lab Status: Final result Specimen: Blood from Central Venous Line Updated: 12/21/23 1032     WBC 10.21 Thousand/uL      RBC 2.59 Million/uL      Hemoglobin 7.6 g/dL      Hematocrit 24.6 %      MCV 95 fL      MCH 29.3 pg      MCHC 30.9 g/dL      RDW 17.8 %      MPV 9.0 fL      Platelets 442 Thousands/uL      nRBC 0 /100 WBCs      Neutrophils Relative 73 %      Immat GRANS % 2 %      Lymphocytes Relative 11 %      Monocytes Relative 12 %       Eosinophils Relative 1 %      Basophils Relative 1 %      Neutrophils Absolute 7.53 Thousands/µL      Immature Grans Absolute 0.17 Thousand/uL      Lymphocytes Absolute 1.12 Thousands/µL      Monocytes Absolute 1.20 Thousand/µL      Eosinophils Absolute 0.13 Thousand/µL      Basophils Absolute 0.06 Thousands/µL                    No orders to display         Procedures  Procedures      ED Course  ED Course as of 12/21/23 1629   Thu Dec 21, 2023   1034 WBC(!): 10.21  10.86 2 days ago   1119 Patient evaluated at bedside, currently doing well, no complaints, has not trialed home vent yet again.   1201 Patient just finished transfusion.  Asymptomatic at this time.  Will monitor.   1308 Patient will need additional parts for her event.  Respiratory therapy is working on getting those from home.                                       Medical Decision Making  Medical complexity: Patient with chronic respiratory failure secondary to her diffuse large B-cell lymphoma.  Has tracheostomy in place.  On chronic ventilator at home.  Functioning well until today and now has malfunction.  Requiring hospital ventilator at this time.  Will order transfusion that was scheduled to be done as outpatient.  Will check labs both pre and post transfusion.  If home ventilator is fixed, may be stable for discharge if labs are at baseline and responded well to transfusion.  Otherwise patient may require admission.  Has no other subjective complaints today that warrant further workup at this time.    Reassessment/disposition: Patient's pretransfusion blood work today did not reveal any anemia as of today.  She may have some slight fluid down component to her blood work.  She will be monitored closely here in the emergency department after her transfusion and if remaining asymptomatic and tolerating transfusion well may be discharged as long as her ventilator is fixed.    Patient's ventilator was fixed by her respiratory therapy team.  She was  trialed on her home vent for several minutes without any issues.  Patient is requesting discharge at this time and is asymptomatic from her transfusion.  No indication at this time for posttransfusion labs as patient was already above goal pretransfusion and has no active signs of bleeding.  Patient states that she continues to feel asymptomatic.  Will be discharged with close follow-up with her hematology/oncology team as well as her PCP.  She will come back to the emergency department or call 911 if there are any further issues with her home transport vent.    Amount and/or Complexity of Data Reviewed  Labs: ordered. Decision-making details documented in ED Course.          Disposition  Final diagnoses:   Anemia   Chronic respiratory failure (HCC)   DLBCL (diffuse large B cell lymphoma) (HCC)     Time reflects when diagnosis was documented in both MDM as applicable and the Disposition within this note       Time User Action Codes Description Comment    12/21/2023 12:33 PM Josef Townsend [D64.9] Anemia     12/21/2023 12:34 PM Josef Townsend [J96.10] Chronic respiratory failure (HCC)     12/21/2023 12:34 PM Josef Townsend [C83.30] DLBCL (diffuse large B cell lymphoma) (HCC)           ED Disposition       ED Disposition   Discharge    Condition   Stable    Date/Time   u Dec 21, 2023  1:47 PM    Comment   Lilia Ross discharge to home/self care.                   Follow-up Information       Follow up With Specialties Details Why Contact Info Additional Information    Antonia Ochoa MD Family Medicine   [address] [place] [address]  [phone]       [facility] Emergency Department Emergency Medicine Go to  If symptoms worsen, As needed [address] [address] [phone] [facility] Emergency Department, [address], [place], [id number] [phone]            Discharge Medication List as of 12/21/2023  12:42 PM        CONTINUE these medications which have NOT CHANGED    Details   acetaminophen (TYLENOL) 650 mg CR tablet Take 1 tablet (650 mg total) by mouth every 8 (eight) hours as needed for moderate pain, Starting Fri 8/20/2021, Normal      apixaban (ELIQUIS) 5 mg Take 1 tablet (5 mg total) by mouth 2 (two) times a day, Starting Thu 11/30/2023, Until Sat 12/30/2023, Normal      budesonide (PULMICORT) 90 MCG/ACT inhaler Inhale 1 puff 2 (two) times a day Rinse mouth after use., Starting Wed 12/20/2023, Normal      busPIRone (BUSPAR) 30 MG tablet TAKE 1 TABLET BY MOUTH 3 TIMES A DAY., Starting Tue 9/19/2023, Normal      Calcium Carb-Cholecalciferol (OSCAL-D) 500 mg-200 units per tablet Take 1 tablet by mouth 2 (two) times a day with meals, Historical Med      famotidine (PEPCID) 20 mg tablet Take 1 tablet (20 mg total) by mouth 2 (two) times a day, Starting Thu 9/7/2023, Normal      folic acid (FOLVITE) 1 mg tablet 1 tablet (1 mg total) by Per G Tube route daily, Starting Wed 12/13/2023, Normal      formoterol (PERFOROMIST) 20 MCG/2ML nebulizer solution Take 2 mL (20 mcg total) by nebulization every 12 (twelve) hours, Starting Wed 12/20/2023, Until Mon 6/17/2024, Normal      gabapentin (NEURONTIN) 300 mg capsule 1 capsule (300 mg total) by Per G Tube route 3 (three) times a day, Starting Wed 12/13/2023, Normal      ipratropium (ATROVENT) 0.02 % nebulizer solution Take 2.5 mL (0.5 mg total) by nebulization every 6 (six) hours, Starting Wed 12/20/2023, Until Fri 1/19/2024, Normal      levalbuterol (XOPENEX) 1.25 mg/3 mL nebulizer solution Take 3 mL (1.25 mg total) by nebulization every 6 (six) hours, Starting Tue 12/19/2023, Until Thu 1/18/2024, Normal      melatonin 3 mg 1 tablet (3 mg total) by Per G Tube route daily at bedtime, Starting Wed 12/13/2023, Normal      metoprolol tartrate (LOPRESSOR) 25 mg tablet 1 tablet (25 mg total) by Per G Tube route every 12 (twelve) hours, Starting Wed 12/13/2023, Normal       naloxone (NARCAN) 4 mg/0.1 mL nasal spray Administer 1 spray into a nostril. If no response after 2-3 minutes, give another dose in the other nostril using a new spray., Normal      nicotine (NICODERM CQ) 7 mg/24hr TD 24 hr patch Place 1 patch on the skin over 24 hours every 24 hours, Starting Wed 12/13/2023, Normal      ondansetron (ZOFRAN-ODT) 4 mg disintegrating tablet Take 1 tablet (4 mg total) by mouth every 8 (eight) hours, Starting u 12/7/2023, Normal      oxyCODONE (Roxicodone) 5 immediate release tablet 1 tablet (5 mg total) by Per G Tube route 2 (two) times a day as needed for moderate pain or severe pain Max Daily Amount: 10 mg, Starting Wed 12/13/2023, Normal      oxygen gas Inhale 12 L/min continuous. pt on ventilator - o2 mask   Indications: Tracheostomy, Historical Med      QUEtiapine (SEROquel) 50 mg tablet 1 tablet (50 mg total) by Per G Tube route 2 (two) times a day, Starting Wed 12/13/2023, Normal      sertraline (ZOLOFT) 100 mg tablet 1 tablet (100 mg total) by Per PEG Tube route daily, Starting Wed 12/13/2023, Normal      sodium chloride, PF, 0.9 % Inject 5 mL into a catheter in a vein 2 (two) times a day as needed for line care, Starting Mon 12/4/2023, Until Wed 1/3/2024 at 2359, Normal           No discharge procedures on file.    PDMP Review         Value Time User    PDMP Reviewed  Yes 12/13/2023  4:33 PM Chai Sutherland MD             ED Provider  Attending physically available and evaluated Lilia Ross. I managed the patient along with the ED Attending.    Electronically Signed by           Josef Townsend MD  12/21/23 7677

## 2023-12-21 NOTE — ED NOTES
Per family request, this RN called [facility] ambulance for transportation. [place] [facility] sending transportation at this time.      Reina Mathews RN  12/21/23 4731

## 2023-12-21 NOTE — PROGRESS NOTES
Pt to infusion for PRBC transfusion. Pt arrives via ambulance with son on ventilator via trach. Ambulance reports suction needed one time enroute to this appt. Suction was not brought with pt to appt today. Pt placed in bed and attempted to hook up ventilator to our concentrator. Pt requires 15L and our concentrator only goes to 10L. Respiratory was called to assist with piggybacking 02 tank to ventilator to make 15L.     [facility] central line assessed and flushed. Dressing was changed yesterday clean dry and intact.  BP elevated even with manual bp pt did not take bp medications prior to coming and did not bring with.  Spoke with Elidia JIMÉNEZ in office and relayed above information. Pt should be sent to ED to better manage care and transfuse PRBC. Relayed above information to son and daughter via telephone. During this time respiratory was managing ventilator/tubing and malfunction happened.  Pt was bagged and medical emergency called.  Pt taken to ED.

## 2023-12-21 NOTE — DISCHARGE INSTRUCTIONS
Please keep your appointments as scheduled.  If you are having symptoms of chest tightness, chest pain, shortness of breath, or any new rash please come back to the emergency department.

## 2023-12-21 NOTE — TELEPHONE ENCOUNTER
Patient Call    Who are you speaking with? Child Linnette   If it is not the patient, are they listed on an active communication consent form? Yes   What is the reason for this call? Linnette calling in regards to patient's medical condition.  Patient is currently at the hospital to have a blood transfusion.  [facility] infusion stated that patient's blood pressure is high and they cannot do the transfusion. [facility] infusion also stated that patient's vent needs to be suctioned and that something is wrong with her vent.  [facility] is requesting patient to go to ED.  Linnette states that patient has a PET scan tomorrow and was advised by Dr. Crespo to not have patient be admitted to the hospital if possible unless it is a medical emergency so patient can have PET scan done.  Linnette would like a call back to discuss patient's symptoms and next steps.     Linnette states when she spoke with patient she was still at infusion and the ED doctor was coming over to see her but Linnette believes the plan will be for patient to go to the ED.    Does this require a call back? Yes   If a call back is required, please list best call back number [phone]   If a call back is required, advise that a message will be forwarded to their care team and someone will return their call as soon as possible.   Did you relay this information to the patient? Yes

## 2023-12-21 NOTE — CASE COMMUNICATION
ALEXA.  Arrived at scheduled ST visit.  Pt reportedly was overwhelmed by just having OT session.  Son requested to wait to have ST until after the holidays.       Note. Pt not seen as per visit set of 1x wk this week and next week.

## 2023-12-21 NOTE — ED ATTENDING ATTESTATION
"I, Bernardo Vallejo MD, saw and evaluated the patient. I have discussed the patient with the resident and agree with the resident's findings, Plan of Care, and MDM as documented in the resident's note, except where noted. All available labs and Radiology studies were reviewed.  I was present for key portions of any procedure(s) performed by the resident and I was immediately available to provide assistance.    At this point I agree with the current assessment done in the Emergency Department.  I have conducted an independent evaluation of this patient a history and physical is as follows:    69 yo female with a history of anxiety, depression, hyperlipidemia, HTN, and large cell lymphoma with chronic trach/vent brought to the ED from the infusion suite as a medical emergency. The patient's home vent was apparently not functioning correctly so staff sent her to the ED for vent management. She has no complaints and says she feels \"fine\". She was scheduled for an outpatient blood transfusion today. No chest pain, shortness of breath, nausea, vomiting, and diaphoresis. No other specific complaints.    ROS: per resident physician note    Gen: NAD, AA&Ox3  HEENT: PERRL, EOMI  Neck: supple, (+) tracheostomy in place  CV: RRR  Lungs: CTA B/L  Abdomen: soft, NT/ND  Ext: no swelling or deformity  Neuro: 5/5 strength all extremities, sensation grossly intact  Skin: no rash    ED Course  The patient is comfortable appearing with stable vital signs and a benign physical examination. She was switched to a hospital vent without incident. Respiratory says the patient is missing a piece on her home vent. Will check basic labs and order 1 unit PRBCs. Respiratory will attempt to repair the patient's home vent. Will continue to monitor closely in the ED. Disposition per workup and reassessment.      Critical Care Time  Procedures   "

## 2023-12-22 ENCOUNTER — HOSPITAL ENCOUNTER (OUTPATIENT)
Dept: RADIOLOGY | Age: 68
Discharge: HOME | End: 2023-12-22
Payer: COMMERCIAL

## 2023-12-22 ENCOUNTER — VBI (OUTPATIENT)
Dept: FAMILY MEDICINE CLINIC | Facility: OTHER | Age: 68
End: 2023-12-22

## 2023-12-22 DIAGNOSIS — C83.31 DIFFUSE LARGE B-CELL LYMPHOMA OF LYMPH NODES OF HEAD (HCC): ICD-10-CM

## 2023-12-22 LAB
ABO GROUP BLD BPU: NORMAL
BPU ID: NORMAL
CROSSMATCH: NORMAL
GLUCOSE SERPL-MCNC: 84 MG/DL (ref 65–140)
UNIT DISPENSE STATUS: NORMAL
UNIT PRODUCT CODE: NORMAL
UNIT PRODUCT VOLUME: 350 ML
UNIT RH: NORMAL

## 2023-12-22 PROCEDURE — 78815 PET IMAGE W/CT SKULL-THIGH: CPT

## 2023-12-22 PROCEDURE — 82948 REAGENT STRIP/BLOOD GLUCOSE: CPT

## 2023-12-22 PROCEDURE — A9552 F18 FDG: HCPCS

## 2023-12-22 NOTE — TELEPHONE ENCOUNTER
12/22/23 9:43 AM    Patient contacted post ED visit, [facility] department spoke with patient/caregiver and outreach was successful.    Thank you.  Dwaine Ralph  [facility]

## 2023-12-25 DIAGNOSIS — Z99.11 DEPENDENT ON VENTILATOR (HCC): ICD-10-CM

## 2023-12-25 DIAGNOSIS — Z45.2 PERIPHERALLY INSERTED CENTRAL CATHETER (PICC) IN PLACE: ICD-10-CM

## 2023-12-25 DIAGNOSIS — C85.80 LARGE CELL LYMPHOMA (HCC): Primary | ICD-10-CM

## 2023-12-25 DIAGNOSIS — Z43.0 TRACHEOSTOMY CARE (HCC): ICD-10-CM

## 2023-12-26 ENCOUNTER — HOME CARE VISIT (OUTPATIENT)
Dept: HOME HEALTH SERVICES | Facility: HOME HEALTHCARE | Age: 68
End: 2023-12-26
Payer: COMMERCIAL

## 2023-12-26 ENCOUNTER — PATIENT OUTREACH (OUTPATIENT)
Dept: HEMATOLOGY ONCOLOGY | Facility: CLINIC | Age: 68
End: 2023-12-26

## 2023-12-26 DIAGNOSIS — J96.01 ACUTE RESPIRATORY FAILURE WITH HYPOXIA (HCC): ICD-10-CM

## 2023-12-26 DIAGNOSIS — G89.4 PAIN SYNDROME, CHRONIC: Chronic | ICD-10-CM

## 2023-12-26 PROCEDURE — G0152 HHCP-SERV OF OT,EA 15 MIN: HCPCS

## 2023-12-26 RX ORDER — OXYCODONE HYDROCHLORIDE 5 MG/1
5 TABLET ORAL 2 TIMES DAILY PRN
Qty: 90 TABLET | Refills: 0 | OUTPATIENT
Start: 2023-12-26

## 2023-12-26 RX ORDER — LEVALBUTEROL INHALATION SOLUTION 1.25 MG/3ML
1.25 SOLUTION RESPIRATORY (INHALATION) EVERY 6 HOURS
Qty: 360 ML | Refills: 0 | OUTPATIENT
Start: 2023-12-26 | End: 2024-01-25

## 2023-12-26 NOTE — TELEPHONE ENCOUNTER
Daughter was stating patient also needs more inhalation liquid, unsure of the name she said maybe-vyaire. (Unable to find in medications) Daughter states that she is almost out

## 2023-12-26 NOTE — PROGRESS NOTES
MSW received a referral for this pt from the pt's PCP.  Outreach was made this day and the pt's daughter states that she received a call from another  earlier this day.  Pt's daughter appeared confused as she reports no needs.  MSW explained that the order was placed for the oncology social worker through the pt's PCP, which is unusual.  Pt's daughter states that she called for a medication refill to the PCP.  Pt's daughter is the caregiver and has no social work issues or concerns.  She was encouraged to call if her needs should change.

## 2023-12-27 ENCOUNTER — HOME CARE VISIT (OUTPATIENT)
Dept: HOME HEALTH SERVICES | Facility: HOME HEALTHCARE | Age: 68
End: 2023-12-27
Payer: COMMERCIAL

## 2023-12-27 ENCOUNTER — APPOINTMENT (OUTPATIENT)
Dept: LAB | Facility: HOSPITAL | Age: 68
End: 2023-12-27
Attending: STUDENT IN AN ORGANIZED HEALTH CARE EDUCATION/TRAINING PROGRAM
Payer: COMMERCIAL

## 2023-12-27 ENCOUNTER — TELEPHONE (OUTPATIENT)
Dept: HEMATOLOGY ONCOLOGY | Facility: CLINIC | Age: 68
End: 2023-12-27

## 2023-12-27 VITALS
RESPIRATION RATE: 18 BRPM | HEART RATE: 88 BPM | OXYGEN SATURATION: 95 % | TEMPERATURE: 97.8 F | DIASTOLIC BLOOD PRESSURE: 88 MMHG | SYSTOLIC BLOOD PRESSURE: 144 MMHG

## 2023-12-27 DIAGNOSIS — C85.80 LYMPHOSARCOMA, MIXED CELL TYPE (HCC): ICD-10-CM

## 2023-12-27 DIAGNOSIS — J39.2 OROPHARYNGEAL MASS: ICD-10-CM

## 2023-12-27 LAB
ALBUMIN SERPL BCP-MCNC: 3 G/DL (ref 3.5–5)
ALP SERPL-CCNC: 61 U/L (ref 34–104)
ALT SERPL W P-5'-P-CCNC: 9 U/L (ref 7–52)
ANION GAP SERPL CALCULATED.3IONS-SCNC: 9 MMOL/L
AST SERPL W P-5'-P-CCNC: 10 U/L (ref 13–39)
BASOPHILS # BLD AUTO: 0.11 THOUSANDS/ÂΜL (ref 0–0.1)
BASOPHILS NFR BLD AUTO: 1 % (ref 0–1)
BILIRUB SERPL-MCNC: 0.3 MG/DL (ref 0.2–1)
BUN SERPL-MCNC: 22 MG/DL (ref 5–25)
CALCIUM ALBUM COR SERPL-MCNC: 10.1 MG/DL (ref 8.3–10.1)
CALCIUM SERPL-MCNC: 9.3 MG/DL (ref 8.4–10.2)
CHLORIDE SERPL-SCNC: 106 MMOL/L (ref 96–108)
CO2 SERPL-SCNC: 27 MMOL/L (ref 21–32)
CREAT SERPL-MCNC: 1.56 MG/DL (ref 0.6–1.3)
EOSINOPHIL # BLD AUTO: 0.6 THOUSAND/ÂΜL (ref 0–0.61)
EOSINOPHIL NFR BLD AUTO: 6 % (ref 0–6)
ERYTHROCYTE [DISTWIDTH] IN BLOOD BY AUTOMATED COUNT: 18 % (ref 11.6–15.1)
GFR SERPL CREATININE-BSD FRML MDRD: 33 ML/MIN/1.73SQ M
GLUCOSE SERPL-MCNC: 117 MG/DL (ref 65–140)
HCT VFR BLD AUTO: 30.3 % (ref 34.8–46.1)
HGB BLD-MCNC: 9.1 G/DL (ref 11.5–15.4)
IMM GRANULOCYTES # BLD AUTO: 0.12 THOUSAND/UL (ref 0–0.2)
IMM GRANULOCYTES NFR BLD AUTO: 1 % (ref 0–2)
LYMPHOCYTES # BLD AUTO: 1.56 THOUSANDS/ÂΜL (ref 0.6–4.47)
LYMPHOCYTES NFR BLD AUTO: 14 % (ref 14–44)
MCH RBC QN AUTO: 29.5 PG (ref 26.8–34.3)
MCHC RBC AUTO-ENTMCNC: 30 G/DL (ref 31.4–37.4)
MCV RBC AUTO: 98 FL (ref 82–98)
MONOCYTES # BLD AUTO: 1.26 THOUSAND/ÂΜL (ref 0.17–1.22)
MONOCYTES NFR BLD AUTO: 12 % (ref 4–12)
MYCOBACTERIUM SPEC CULT: NORMAL
MYCOBACTERIUM SPEC CULT: NORMAL
NEUTROPHILS # BLD AUTO: 7.16 THOUSANDS/ÂΜL (ref 1.85–7.62)
NEUTS SEG NFR BLD AUTO: 66 % (ref 43–75)
NRBC BLD AUTO-RTO: 1 /100 WBCS
PLATELET # BLD AUTO: 363 THOUSANDS/UL (ref 149–390)
PMV BLD AUTO: 9.6 FL (ref 8.9–12.7)
POTASSIUM SERPL-SCNC: 4.6 MMOL/L (ref 3.5–5.3)
PROT SERPL-MCNC: 5.4 G/DL (ref 6.4–8.4)
RBC # BLD AUTO: 3.08 MILLION/UL (ref 3.81–5.12)
RHODAMINE-AURAMINE STN SPEC: NORMAL
RHODAMINE-AURAMINE STN SPEC: NORMAL
SODIUM SERPL-SCNC: 142 MMOL/L (ref 135–147)
WBC # BLD AUTO: 10.81 THOUSAND/UL (ref 4.31–10.16)

## 2023-12-27 PROCEDURE — 36415 COLL VENOUS BLD VENIPUNCTURE: CPT

## 2023-12-27 PROCEDURE — 80053 COMPREHEN METABOLIC PANEL: CPT

## 2023-12-27 PROCEDURE — G0300 HHS/HOSPICE OF LPN EA 15 MIN: HCPCS

## 2023-12-27 PROCEDURE — 85025 COMPLETE CBC W/AUTO DIFF WBC: CPT

## 2023-12-27 NOTE — TELEPHONE ENCOUNTER
Medication Refill Request   Who are you speaking with? Child   If it is not the patient, are they listed on an active communication consent form?     Yes   Which medication is being requested for refill?  Please list medication name and dosage  Oxycodone 5mg   How many pills does the patient have left?  4   Preferred Pharmacy / Address  [facility]/pharmacy #[id number] - [place] - [address]   [address] [address]    Who is the prescribing provider? Dr. Crespo   Call back number  [phone]   Relevant Information  N/A

## 2023-12-28 ENCOUNTER — TELEPHONE (OUTPATIENT)
Dept: PULMONOLOGY | Facility: CLINIC | Age: 68
End: 2023-12-28

## 2023-12-28 NOTE — TELEPHONE ENCOUNTER
Refill was called out to pt's pharmacy on 12/13; family was able to use this, and has no concerns at this time.

## 2023-12-29 ENCOUNTER — HOME CARE VISIT (OUTPATIENT)
Dept: HOME HEALTH SERVICES | Facility: HOME HEALTHCARE | Age: 68
End: 2023-12-29
Payer: COMMERCIAL

## 2023-12-29 VITALS — OXYGEN SATURATION: 96 % | HEART RATE: 84 BPM

## 2023-12-29 PROCEDURE — G0151 HHCP-SERV OF PT,EA 15 MIN: HCPCS

## 2023-12-30 NOTE — CASE COMMUNICATION
ALEXA.  Pt not seen this week for ST at request of Pt and Son.    Plan was to see Pt 1x wk this week.

## 2024-01-03 ENCOUNTER — HOME CARE VISIT (OUTPATIENT)
Dept: HOME HEALTH SERVICES | Facility: HOME HEALTHCARE | Age: 69
End: 2024-01-03
Payer: COMMERCIAL

## 2024-01-03 ENCOUNTER — APPOINTMENT (OUTPATIENT)
Dept: LAB | Facility: CLINIC | Age: 69
End: 2024-01-03
Payer: COMMERCIAL

## 2024-01-03 ENCOUNTER — TRANSCRIBE ORDERS (OUTPATIENT)
Dept: LAB | Facility: CLINIC | Age: 69
End: 2024-01-03

## 2024-01-03 DIAGNOSIS — C85.80 LYMPHOSARCOMA, MIXED CELL TYPE (HCC): ICD-10-CM

## 2024-01-03 DIAGNOSIS — C85.80 LYMPHOSARCOMA, MIXED CELL TYPE (HCC): Primary | ICD-10-CM

## 2024-01-03 LAB
ALBUMIN SERPL BCP-MCNC: 2.9 G/DL (ref 3.5–5)
ALP SERPL-CCNC: 72 U/L (ref 34–104)
ALT SERPL W P-5'-P-CCNC: 10 U/L (ref 7–52)
ANION GAP SERPL CALCULATED.3IONS-SCNC: 8 MMOL/L
AST SERPL W P-5'-P-CCNC: 12 U/L (ref 13–39)
BASOPHILS # BLD AUTO: 0.11 THOUSANDS/ÂΜL (ref 0–0.1)
BASOPHILS NFR BLD AUTO: 1 % (ref 0–1)
BILIRUB SERPL-MCNC: 0.3 MG/DL (ref 0.2–1)
BUN SERPL-MCNC: 38 MG/DL (ref 5–25)
CALCIUM ALBUM COR SERPL-MCNC: 10.3 MG/DL (ref 8.3–10.1)
CALCIUM SERPL-MCNC: 9.4 MG/DL (ref 8.4–10.2)
CHLORIDE SERPL-SCNC: 104 MMOL/L (ref 96–108)
CO2 SERPL-SCNC: 28 MMOL/L (ref 21–32)
CREAT SERPL-MCNC: 2.48 MG/DL (ref 0.6–1.3)
EOSINOPHIL # BLD AUTO: 0.51 THOUSAND/ÂΜL (ref 0–0.61)
EOSINOPHIL NFR BLD AUTO: 5 % (ref 0–6)
ERYTHROCYTE [DISTWIDTH] IN BLOOD BY AUTOMATED COUNT: 19.7 % (ref 11.6–15.1)
GFR SERPL CREATININE-BSD FRML MDRD: 19 ML/MIN/1.73SQ M
GLUCOSE SERPL-MCNC: 102 MG/DL (ref 65–140)
HCT VFR BLD AUTO: 27.1 % (ref 34.8–46.1)
HGB BLD-MCNC: 7.8 G/DL (ref 11.5–15.4)
IMM GRANULOCYTES # BLD AUTO: 0.05 THOUSAND/UL (ref 0–0.2)
IMM GRANULOCYTES NFR BLD AUTO: 1 % (ref 0–2)
LYMPHOCYTES # BLD AUTO: 0.96 THOUSANDS/ÂΜL (ref 0.6–4.47)
LYMPHOCYTES NFR BLD AUTO: 10 % (ref 14–44)
MCH RBC QN AUTO: 28.9 PG (ref 26.8–34.3)
MCHC RBC AUTO-ENTMCNC: 28.8 G/DL (ref 31.4–37.4)
MCV RBC AUTO: 100 FL (ref 82–98)
MONOCYTES # BLD AUTO: 0.85 THOUSAND/ÂΜL (ref 0.17–1.22)
MONOCYTES NFR BLD AUTO: 9 % (ref 4–12)
NEUTROPHILS # BLD AUTO: 7.01 THOUSANDS/ÂΜL (ref 1.85–7.62)
NEUTS SEG NFR BLD AUTO: 74 % (ref 43–75)
NRBC BLD AUTO-RTO: 1 /100 WBCS
PLATELET # BLD AUTO: 273 THOUSANDS/UL (ref 149–390)
PMV BLD AUTO: 10.1 FL (ref 8.9–12.7)
POTASSIUM SERPL-SCNC: 5.2 MMOL/L (ref 3.5–5.3)
PROT SERPL-MCNC: 5.1 G/DL (ref 6.4–8.4)
RBC # BLD AUTO: 2.7 MILLION/UL (ref 3.81–5.12)
SODIUM SERPL-SCNC: 140 MMOL/L (ref 135–147)
WBC # BLD AUTO: 9.49 THOUSAND/UL (ref 4.31–10.16)

## 2024-01-03 PROCEDURE — G0153 HHCP-SVS OF S/L PATH,EA 15MN: HCPCS

## 2024-01-03 PROCEDURE — G0299 HHS/HOSPICE OF RN EA 15 MIN: HCPCS

## 2024-01-03 PROCEDURE — 85025 COMPLETE CBC W/AUTO DIFF WBC: CPT

## 2024-01-03 PROCEDURE — 80053 COMPREHEN METABOLIC PANEL: CPT

## 2024-01-03 PROCEDURE — 36415 COLL VENOUS BLD VENIPUNCTURE: CPT

## 2024-01-04 ENCOUNTER — HOME CARE VISIT (OUTPATIENT)
Dept: HOME HEALTH SERVICES | Facility: HOME HEALTHCARE | Age: 69
End: 2024-01-04
Payer: COMMERCIAL

## 2024-01-04 ENCOUNTER — TELEPHONE (OUTPATIENT)
Dept: HEMATOLOGY ONCOLOGY | Facility: CLINIC | Age: 69
End: 2024-01-04

## 2024-01-04 VITALS
SYSTOLIC BLOOD PRESSURE: 106 MMHG | RESPIRATION RATE: 16 BRPM | DIASTOLIC BLOOD PRESSURE: 70 MMHG | OXYGEN SATURATION: 93 % | HEART RATE: 81 BPM | TEMPERATURE: 97.3 F

## 2024-01-04 DIAGNOSIS — I82.C11 ACUTE EMBOLISM AND THROMBOSIS OF RIGHT INTERNAL JUGULAR VEIN (HCC): ICD-10-CM

## 2024-01-04 NOTE — TELEPHONE ENCOUNTER
Patient Call    Who are you speaking with? Child    If it is not the patient, are they listed on an active communication consent form? Yes   What is the reason for this call? He called to ask status of fmla   Does this require a call back? Yes   If a call back is required, please list best call back number [phone]    If a call back is required, advise that a message will be forwarded to their care team and someone will return their call as soon as possible.   Did you relay this information to the patient? Yes

## 2024-01-04 NOTE — TELEPHONE ENCOUNTER
Hi, good morning. This is Светлана calling from [facility]. I am calling regarding your client patient Lilia Ross, date of birth 1955. We are going to be her new home care provider providing private duty skilled nursing and I'm asking that Doctor Kerrie write up a letter of medical necessity for her insurance company [employer] for private duty nursing 63 hours a week 9:00 PM to 6:00 AM daily due to her being on an event entry. If someone can please just call me back to confirm you received this and I can give you our fax number and phone number. Our phone number is [phone] and our fax number is [phone]. Again, my name is Светлана with [facility] and we're looking for a letter of medical necessity for [facility] for private duty nursing. Thank you.

## 2024-01-05 ENCOUNTER — TELEPHONE (OUTPATIENT)
Dept: FAMILY MEDICINE CLINIC | Facility: OTHER | Age: 69
End: 2024-01-05

## 2024-01-05 NOTE — TELEPHONE ENCOUNTER
Hi, good morning. This is Светлана calling from [facility]. I am calling regarding your client patient Lilia Ross, date of birth 1955. We are going to be her new home care provider providing private duty skilled nursing and I'm asking that Doctor Kerrie write up a letter of medical necessity for her insurance company [employer] for private duty nursing 63 hours a week 9:00 PM to 6:00 AM daily due to her being on an event entry. If someone can please just call me back to confirm you received this and I can give you our fax number and phone number. Our phone number is [phone] and our fax number is [phone]. Again, my name is Светлана with [facility] and we're looking for a letter of medical necessity for [facility] for private duty nursing. Thank you

## 2024-01-05 NOTE — TELEPHONE ENCOUNTER
Returned Светлана's phone call.  Insurance no longer needs letter of necessity.  It has gone through already.  Thank you.

## 2024-01-06 NOTE — CASE COMMUNICATION
This message is informative only.  No action required.     Pt was DC from [facility] wed 1.3.24.    Impression. Observed mild  oral stage dysphagia with compensatory techniques which is an increase from  mild.moderate oral stage dysphagia characterized by inability to masticate slice of lunch meat and cheese.  WFL pharyngeal stage dysphagia with compensatory techniques which is an increase from Possible mild pharyngeal stage dysphagia when e ating large bites of regular diet without compensatory techniques characterized by coughing. Observed speech to be WFL in conversational speech when speaking on vent. Hospital records reported that Pt had good voicing with PMV. Oriented x 3. Observed language to be WFL in conversational speech.   Mild [place] [employer] [place]. She reported that she has mild pain in her L ear at a level 3.  reportedly aware.   Vision. Cataract in L eye. Vision in R eye re portedly WFL. and she can read newspaper print       Rec.   DC from    Practice assignments given   Cont on Dysphagia level 3 diet--soft, moist foods with compensatory techniques which include chew well, chin tuck before swallow, sip every 2 to 3 bites as needed   offer cheese curls vs popcorn. trial open faced sandwich w 1 slice meat and or cheese cut up into small pieces   trail pb and jelly sandwich starting w very little pb.   Con t on thin liquids w compensatory techniques which include small sips slowly w chin tuck before swallow   Trial taking meds orally w puree or liquids as tolerated   ex. lemonade or lemon yogurt, lemon yogurt or lemon sherbert   Cont aspiration precautions. Contact Dr if change in either temp and or lung condition   Cont.  Mouth wash at least 2x per day w toothettes   Moniter weight-- 165 lbs at present.   which remained the same     [address] daily weights and record foods/liquids and amounts eaten   Contact  about sore gums.   Note. Gums reportedly are better since she is eating softer foods.    Refer if change in  status

## 2024-01-08 NOTE — TELEPHONE ENCOUNTER
Светлана from [facility] called again this morning and they really need this letter     Please see below     Thank you

## 2024-01-09 ENCOUNTER — HOME CARE VISIT (OUTPATIENT)
Dept: HOME HEALTH SERVICES | Facility: HOME HEALTHCARE | Age: 69
End: 2024-01-09
Payer: COMMERCIAL

## 2024-01-09 VITALS — SYSTOLIC BLOOD PRESSURE: 102 MMHG | DIASTOLIC BLOOD PRESSURE: 68 MMHG | HEART RATE: 74 BPM | OXYGEN SATURATION: 93 %

## 2024-01-09 PROCEDURE — G0151 HHCP-SERV OF PT,EA 15 MIN: HCPCS

## 2024-01-10 ENCOUNTER — HOME CARE VISIT (OUTPATIENT)
Dept: HOME HEALTH SERVICES | Facility: HOME HEALTHCARE | Age: 69
End: 2024-01-10
Payer: COMMERCIAL

## 2024-01-10 ENCOUNTER — OFFICE VISIT (OUTPATIENT)
Age: 69
End: 2024-01-10
Payer: COMMERCIAL

## 2024-01-10 ENCOUNTER — OFFICE VISIT (OUTPATIENT)
Dept: HEMATOLOGY ONCOLOGY | Facility: CLINIC | Age: 69
End: 2024-01-10
Payer: COMMERCIAL

## 2024-01-10 VITALS
RESPIRATION RATE: 17 BRPM | BODY MASS INDEX: 30.48 KG/M2 | DIASTOLIC BLOOD PRESSURE: 98 MMHG | SYSTOLIC BLOOD PRESSURE: 158 MMHG | HEART RATE: 87 BPM | HEIGHT: 60 IN | OXYGEN SATURATION: 100 % | TEMPERATURE: 97.2 F

## 2024-01-10 DIAGNOSIS — Z93.1 GASTROSTOMY TUBE IN PLACE (HCC): Chronic | ICD-10-CM

## 2024-01-10 DIAGNOSIS — I50.30 HEART FAILURE WITH PRESERVED EJECTION FRACTION, UNSPECIFIED HF CHRONICITY (HCC): Primary | Chronic | ICD-10-CM

## 2024-01-10 DIAGNOSIS — C85.80 LARGE CELL LYMPHOMA (HCC): ICD-10-CM

## 2024-01-10 DIAGNOSIS — Z99.11 VENTILATOR DEPENDENCE (HCC): Chronic | ICD-10-CM

## 2024-01-10 DIAGNOSIS — C85.80 LARGE CELL LYMPHOMA (HCC): Primary | ICD-10-CM

## 2024-01-10 DIAGNOSIS — Z93.0 TRACHEOSTOMY IN PLACE (HCC): ICD-10-CM

## 2024-01-10 PROCEDURE — 99349 HOME/RES VST EST MOD MDM 40: CPT | Performed by: FAMILY MEDICINE

## 2024-01-10 PROCEDURE — 99214 OFFICE O/P EST MOD 30 MIN: CPT | Performed by: INTERNAL MEDICINE

## 2024-01-10 NOTE — PROGRESS NOTES
Hematology Outpatient Follow - Up Note  Liila Ross 68 y.o. 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 double especially or of c-Myc and Bcl-2, positive for T p53     She had multiple comorbidities, treated as inpatient for a total of 4 cycles of R-EPOCH, now she is on the vent support with the tracheostomy, her performance status on ECOG 3    PET scan on January 2024 showed significant activity and decrease in the hypopharynx activity to the SUV of 2, continue watchful observation  Increasing BUN/creatinine she needs to have poor hydration will repeat CBC, CMP in 2 weeks  Anemia again she might need Procrit will try to get Procrit 20,000 units subcu every week at home as her daughter-in-law is a nurse        Labs and imaging studies are reviewed by ordering provider once results are available. If there are findings that need immediate attention, you will be contacted when results available.   Discussing results and the implication on your healthcare is best discussed in person at your follow-up visit.       HPI:    68-year-old -American female who stayed in the hospital for almost 60 days around September 2023 because of large B-cell lymphoma of the oropharynx with local invasion and extension status post R-EPOCH for 4 cycles with renal adjustment     She had respiratory failure she is on tracheostomy and oxygen support about 8 to 12 L, she had chronic kidney disease, hypercalcemia, anxiety    PET scan on 1/2024 showed significant decrease in the activity of the hypopharynx consistent with very good response     She is deconditioned  Interval History:        Previous Treatment:         Test Results:    Imaging: NM PET CT skull base to mid thigh    Result Date: 12/22/2023  Narrative: PET/CT SCAN INDICATION: C83.31: Diffuse large b-cell lymphoma, lymph nodes of head, face, and neck  Nasopharyngeal lymphoma, restaging examination following chemotherapy. MODIFIER: PS COMPARISON: No prior PET scans for comparison. Chest CT 11/22/2023, neck CT 11/15/2023, CT abdomen 10/12/2023 CELL TYPE: Diffuse large B-cell lymphoma, germinal center B-cell type, of the nasopharynx TECHNIQUE:   10.9 mCi F-18-FDG administered IV. Multiplanar attenuation corrected and non attenuation corrected PET images are available for interpretation, and contiguous, low dose, axial CT sections were obtained from the skull base through the femurs.  Intravenous contrast material was not utilized. This examination, like all CT scans performed in the [facility], was performed utilizing techniques to minimize radiation dose exposure, including the use of iterative reconstruction and automated exposure control. *Tracheotomy tube and PEG tube. Patient had difficulty lying horizontally, exam had to be performed with the patient partially elevated Fasting serum glucose: 84 mg/dl FINDINGS: VISUALIZED BRAIN: Motion artifact is present. There is no obvious acute abnormality HEAD/NECK: -In comparison to the previous neck CT, degree of asymmetric soft tissue density in the left nasopharynx has partially improved. SUV max on the left, image 65, is 2.4, corresponding area on the right demonstrates SUV max of 2.2. - No suspicious FDG avid cervical adenopathy or other new area of FDG avid soft tissue disease CT images: Tracheotomy tube noted. Activity along the tract is anticipated. CHEST: -No FDG avid intrathoracic or axillary adenopathy - Rather diffuse increased activity is seen within the dependent portions of the lower lobes particularly on the left, SUV max 3.7. This corresponds to bibasilar posterior/dependent airspace opacities which, in comparison to the most recent CT study appear relatively similar. This is likely inflammatory/infectious/atelectatic. CT images: There is a trace right-sided effusion. No pericardial  effusion. Votvew-g-Zkeo catheter is present with tip in region of cavoatrial junction. ABDOMEN: No FDG avid soft tissue lesions are seen. CT images: Right renal cysts are noted. The upper pole complex cyst as seen on earlier abdominal CT demonstrates no abnormal FDG activity. Several subcentimeter hypodense nodules in the left kidney possibly traject cysts. A PEG tube is present. PELVIS: No FDG avid soft tissue lesions are seen. CT images: Small ventral adipose containing hernia. OSSEOUS STRUCTURES: No FDG avid lesions are seen. CT images: Known T12 vertebral body loss of height with lucent defects similar to previous CT study. There is no associated abnormal FDG activity     Impression: 1. Compared to most recent neck CT, diminished degree of asymmetric soft tissue opacity in the nasopharynx. Minimal FDG activity, SUV max on the left is 2.4 compared to 2.2 on the right. The Deauville score is 2 Liver SUV max is 2.8 Mediastinal blood pool SUV max is 2.5 2. Bibasilar lower lobe airspace disease similar to the most recent prior CT Workstation performed: [id number]       Labs:   Lab Results   Component Value Date    WBC 9.49 01/03/2024    HGB 7.8 (L) 01/03/2024    HCT 27.1 (L) 01/03/2024     (H) 01/03/2024     01/03/2024     Lab Results   Component Value Date     09/30/2015    K 5.2 01/03/2024     01/03/2024    CO2 28 01/03/2024    ANIONGAP 8 09/30/2015    BUN 38 (H) 01/03/2024    CREATININE 2.48 (H) 01/03/2024    GLUCOSE 138 11/26/2023    GLUF 98 07/12/2018    CALCIUM 9.4 01/03/2024    CORRECTEDCA 10.3 (H) 01/03/2024    AST 12 (L) 01/03/2024    ALT 10 01/03/2024    ALKPHOS 72 01/03/2024    PROT 8.1 09/30/2015    BILITOT 0.24 09/30/2015    EGFR 19 01/03/2024       Lab Results   Component Value Date    IRON 17 (L) 10/23/2023    TIBC 165 (L) 10/23/2023    FERRITIN 519 (H) 10/23/2023       Lab Results   Component Value Date    NCLRINNE50 761 10/23/2023         ROS: Review of Systems    Constitutional:  Positive for appetite change and fatigue.   Respiratory:  Positive for shortness of breath.           Current Medications: Reviewed  Allergies: Reviewed  PMH/FH/SH:  Reviewed      Physical Exam:    Body surface area is 1.68 meters squared.    Wt Readings from Last 3 Encounters:   12/04/23 70.8 kg (156 lb 1.4 oz)   09/07/23 74.6 kg (164 lb 6.4 oz)   08/30/23 73.3 kg (161 lb 9.6 oz)        Temp Readings from Last 3 Encounters:   01/10/24 (!) 97.2 °F (36.2 °C) (Temporal)   01/03/24 (!) 97.3 °F (36.3 °C)   12/27/23 97.8 °F (36.6 °C) (Temporal)        BP Readings from Last 3 Encounters:   01/10/24 158/98   01/09/24 102/68   01/03/24 106/70         Pulse Readings from Last 3 Encounters:   01/10/24 87   01/09/24 74   01/03/24 81        Physical Exam  Vitals reviewed.   Constitutional:       General: She is not in acute distress.     Appearance: She is well-developed. She is not diaphoretic.   HENT:      Head: Normocephalic and atraumatic.   Eyes:      Conjunctiva/sclera: Conjunctivae normal.   Neck:      Trachea: No tracheal deviation.      Comments: Tracheostomy  Cardiovascular:      Rate and Rhythm: Regular rhythm. Tachycardia present.      Heart sounds: No murmur heard.     No friction rub. No gallop.   Pulmonary:      Effort: Pulmonary effort is normal. No respiratory distress.      Breath sounds: Rhonchi present. No wheezing or rales.   Chest:      Chest wall: No tenderness.   Abdominal:      General: There is no distension.      Palpations: Abdomen is soft.      Tenderness: There is no abdominal tenderness.   Musculoskeletal:      Cervical back: Normal range of motion and neck supple.      Right lower leg: Edema present.      Left lower leg: Edema present.   Lymphadenopathy:      Cervical: No cervical adenopathy.   Skin:     General: Skin is warm and dry.      Coloration: Skin is not pale.      Findings: No erythema.   Neurological:      Mental Status: She is alert and oriented to person, place,  and time.   Psychiatric:         Behavior: Behavior normal.         Thought Content: Thought content normal.         Judgment: Judgment normal.         ECOG:3    Goals and Barriers:  Current Goal: Minimize effects of disease.   Barriers: None.      Patient's Capacity to Self Care:  Patient is able to self care.    Code Status: [unfilled]

## 2024-01-10 NOTE — PROGRESS NOTES
Follow-up Visit in the Home - [facility]  Lilia Ross 68 y.o. female [id number]    1. Heart failure with preserved ejection fraction, unspecified HF chronicity (HCC)    2. Ventilator dependence ([facility])  Assessment & Plan:   advised to continue pressure support ventilation at home PS 4/8 40% with cuff up overnight. During day on 10L w/ FiO2 40% with cuff down.  Goal setting 4/8 at FiO2 30%.  Keep O2 to maintainence SpO2 >85%  Aggressive pulmonary toilet   Suctioning via trach if patient is desatting and unable to expectorate phlegm.  Continue Atrovent and Xopenex for airway maintenance.    Continue inhalation treatment with Pulmicort and formoterol, duonebs , atrovent and xopenex       3. Large cell lymphoma (HCC)    4. Tracheostomy in place ([facility])  Assessment & Plan:  9/17 - Operative Findings:  Normal tracheal anatomy.  7 cuffed proximal XLT shiley tracheostomy placed between 1st and 2nd tracheal cartilages given short neck and deep trachea. Surgicel placed within the tracheotomy site.  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  10/17 by Dr. Lloyd  Asked by Dr. Lundberg for trach exchange to cuffed trach to improve positive pressure ventilation  Patient on 100% FiO2, uncuffed Shiley XLT 7.0 removed and replaced easily with Shiley Cuffed 7.0 ETT, noted persistent inferior stoma skin breakdown w/o purulence  BS equal bilaterally after procedure, good Vt return on ventilator, SpO2 99%  Tolerated procedure well w/o complications      5. Gastrostomy tube in place ([facility])        No orders of the defined types were placed in this encounter.    There are no discontinued medications.      Lilia Ross was seen in their home today for symptoms and care planning related to above diagnoses.  Above orders were sent electronically.  I have reviewed the patient's  controlled substance dispensing history in the Prescription Drug Monitoring Program in compliance with the [facility] regulations before prescribing any controlled substances.    We will continue to follow with this patient through home program.  If there are questions or concerns, please contact us through our clinic/answering service 24 hours a day, seven days a week.    Chai Sutherland MD  [facility]  A service provided by [facility]  [phone]      Visit Information    Accompanied By: Family member    Source of History: Self, Family member    History Limitations: trach in place without PMV    Contacts: daughter Linnette Ross - [phone]    Narrative and Interval History      Lilia Ross is a 68 y.o. female who presents in follow up of symptoms related to LBCL.  The patient is seen in their home due to ambulatory difficulties related to their advanced illness.  Pertinent issues include: symptom management, support      Since last visit, pt continues to struggle with coordination of her home cares and needs for DME and other supports.  Again, we find that multiple items related to her trach + PEG were not well-coordinated for her after her 'AMA' discharge, and the establishment has failed to provide good teaching, training, and supply to the pt in her home.      Importantly, the pt has been eval'ed with Dr. Crespo, who finds the pt's ECOG score too high to rec'd any antineoplastic therapy.  Helpfully, the pt's primary mass in the nasopharynx has reduced markedly in size after consolidation chemo.  Pt and family accept weekly blood draws for possible transfusion for anemia related to her disease, and recent chemo usage.      Heart failure management:    Current weight (if able to use home scale): unable to stand   Dry weight: n/a   Other markers of fluid status: not addressed today   Baseline diuretic and medical regimen:     Social Aspects: support system relies heavily  on family.  DIL is a hired caregiver        In-home services and caregivers: as above.  Pt also has [facility]    DME inventory: hospital bed, fully electric, wit hrails.  Trach in place, with dome.  Trelegy vent.  PEG.    Advance Directive and Living Will:        Power of :      POLST: Yes    Past medical, surgical, social, and family histories are reviewed, and pertinent updates are made.    Review of Systems   Unable to perform ROS: Severe respiratory distress (mucous plugging, challenged to speak)         Vital Signs    There were no vitals taken for this visit.    Physical Exam and Objective Data  Physical Exam  Constitutional:       General: She is not in acute distress.     Appearance: She is ill-appearing. She is not toxic-appearing or diaphoretic.      Comments: frail   HENT:      Head: Normocephalic and atraumatic.      Right Ear: External ear normal.      Left Ear: External ear normal.      Mouth/Throat:      Comments: Trach in place  Eyes:      General:         Right eye: No discharge.         Left eye: No discharge.      Conjunctiva/sclera: Conjunctivae normal.      Pupils: Pupils are equal, round, and reactive to light.   Neck:      Trachea: No tracheal deviation.   Cardiovascular:      Rate and Rhythm: Regular rhythm. Tachycardia present.   Pulmonary:      Effort: Pulmonary effort is normal. No respiratory distress.      Breath sounds: No stridor.   Abdominal:      General: There is no distension.      Palpations: Abdomen is soft.   Skin:     General: Skin is warm and dry.      Coloration: Skin is pale.      Findings: No erythema or rash.   Neurological:      Mental Status: She is oriented to person, place, and time. Mental status is at baseline.           Radiology and Laboratory:  I personally reviewed and interpreted the following results: none new; reviewed hgb trends and othe rblood indices    I have spent a total time of 40+ minutes on 1/10/24 in caring for this patient including chart  review; symptom pursuit; supportive listening; anticipatory guidance. .    All of the patient's / Agent's questions were answered during this discussion.

## 2024-01-11 ENCOUNTER — APPOINTMENT (EMERGENCY)
Dept: RADIOLOGY | Facility: HOSPITAL | Age: 69
End: 2024-01-11
Payer: COMMERCIAL

## 2024-01-11 ENCOUNTER — HOSPITAL ENCOUNTER (OUTPATIENT)
Facility: HOSPITAL | Age: 69
Setting detail: OBSERVATION
Discharge: HOME | End: 2024-01-12
Attending: EMERGENCY MEDICINE | Admitting: FAMILY MEDICINE
Payer: COMMERCIAL

## 2024-01-11 ENCOUNTER — HOME CARE VISIT (OUTPATIENT)
Dept: HOME HEALTH SERVICES | Facility: HOME HEALTHCARE | Age: 69
End: 2024-01-11
Payer: COMMERCIAL

## 2024-01-11 ENCOUNTER — TELEPHONE (OUTPATIENT)
Age: 69
End: 2024-01-11

## 2024-01-11 ENCOUNTER — TELEPHONE (OUTPATIENT)
Dept: PALLIATIVE MEDICINE | Facility: CLINIC | Age: 69
End: 2024-01-11

## 2024-01-11 DIAGNOSIS — G89.4 PAIN SYNDROME, CHRONIC: ICD-10-CM

## 2024-01-11 DIAGNOSIS — Z93.0 TRACHEOSTOMY IN PLACE (HCC): Primary | ICD-10-CM

## 2024-01-11 DIAGNOSIS — Z93.0 TRACHEOSTOMY IN PLACE (HCC): ICD-10-CM

## 2024-01-11 DIAGNOSIS — I50.30 (HFPEF) HEART FAILURE WITH PRESERVED EJECTION FRACTION (HCC): Primary | Chronic | ICD-10-CM

## 2024-01-11 DIAGNOSIS — R13.10 DYSPHAGIA: ICD-10-CM

## 2024-01-11 DIAGNOSIS — Z93.1 GASTROSTOMY TUBE IN PLACE (HCC): ICD-10-CM

## 2024-01-11 DIAGNOSIS — I10 BENIGN ESSENTIAL HYPERTENSION: Chronic | ICD-10-CM

## 2024-01-11 DIAGNOSIS — D61.810 PANCYTOPENIA DUE TO CHEMOTHERAPY (HCC): ICD-10-CM

## 2024-01-11 DIAGNOSIS — Z99.11 VENTILATOR DEPENDENCE (HCC): Chronic | ICD-10-CM

## 2024-01-11 DIAGNOSIS — J95.03 TRACHEOSTOMY MALFUNCTION (HCC): ICD-10-CM

## 2024-01-11 DIAGNOSIS — T85.528A DISLODGED GASTROSTOMY TUBE: ICD-10-CM

## 2024-01-11 DIAGNOSIS — Z93.1 S/P PERCUTANEOUS ENDOSCOPIC GASTROSTOMY (PEG) TUBE PLACEMENT (HCC): ICD-10-CM

## 2024-01-11 DIAGNOSIS — J39.2 OROPHARYNGEAL MASS: ICD-10-CM

## 2024-01-11 DIAGNOSIS — C85.80 LARGE CELL LYMPHOMA (HCC): ICD-10-CM

## 2024-01-11 DIAGNOSIS — Z99.11 VENTILATOR DEPENDENCE (HCC): Primary | Chronic | ICD-10-CM

## 2024-01-11 DIAGNOSIS — J69.0 ASPIRATION PNEUMONIA (HCC): ICD-10-CM

## 2024-01-11 PROBLEM — D64.9 ANEMIA, UNSPECIFIED: Status: ACTIVE | Noted: 2024-01-11

## 2024-01-11 LAB
ABO GROUP BLD: NORMAL
ANION GAP SERPL CALCULATED.3IONS-SCNC: 6 MMOL/L
BASOPHILS # BLD AUTO: 0.1 THOUSANDS/ÂΜL (ref 0–0.1)
BASOPHILS NFR BLD AUTO: 1 % (ref 0–1)
BLD GP AB SCN SERPL QL: NEGATIVE
BUN SERPL-MCNC: 28 MG/DL (ref 5–25)
CALCIUM SERPL-MCNC: 10.1 MG/DL (ref 8.4–10.2)
CHLORIDE SERPL-SCNC: 104 MMOL/L (ref 96–108)
CO2 SERPL-SCNC: 28 MMOL/L (ref 21–32)
CREAT SERPL-MCNC: 2.54 MG/DL (ref 0.6–1.3)
EOSINOPHIL # BLD AUTO: 0.43 THOUSAND/ÂΜL (ref 0–0.61)
EOSINOPHIL NFR BLD AUTO: 5 % (ref 0–6)
ERYTHROCYTE [DISTWIDTH] IN BLOOD BY AUTOMATED COUNT: 19.9 % (ref 11.6–15.1)
GFR SERPL CREATININE-BSD FRML MDRD: 18 ML/MIN/1.73SQ M
GLUCOSE SERPL-MCNC: 109 MG/DL (ref 65–140)
HCT VFR BLD AUTO: 32.2 % (ref 34.8–46.1)
HGB BLD-MCNC: 9.5 G/DL (ref 11.5–15.4)
IMM GRANULOCYTES # BLD AUTO: 0.05 THOUSAND/UL (ref 0–0.2)
IMM GRANULOCYTES NFR BLD AUTO: 1 % (ref 0–2)
INR PPP: 1.89 (ref 0.84–1.19)
LYMPHOCYTES # BLD AUTO: 0.94 THOUSANDS/ÂΜL (ref 0.6–4.47)
LYMPHOCYTES NFR BLD AUTO: 10 % (ref 14–44)
MCH RBC QN AUTO: 29.8 PG (ref 26.8–34.3)
MCHC RBC AUTO-ENTMCNC: 29.5 G/DL (ref 31.4–37.4)
MCV RBC AUTO: 101 FL (ref 82–98)
MONOCYTES # BLD AUTO: 0.64 THOUSAND/ÂΜL (ref 0.17–1.22)
MONOCYTES NFR BLD AUTO: 7 % (ref 4–12)
NEUTROPHILS # BLD AUTO: 6.87 THOUSANDS/ÂΜL (ref 1.85–7.62)
NEUTS SEG NFR BLD AUTO: 76 % (ref 43–75)
NRBC BLD AUTO-RTO: 0 /100 WBCS
PLATELET # BLD AUTO: 263 THOUSANDS/UL (ref 149–390)
PMV BLD AUTO: 9.6 FL (ref 8.9–12.7)
POTASSIUM SERPL-SCNC: 5 MMOL/L (ref 3.5–5.3)
PROTHROMBIN TIME: 21.4 SECONDS (ref 11.6–14.5)
RBC # BLD AUTO: 3.19 MILLION/UL (ref 3.81–5.12)
RH BLD: POSITIVE
SODIUM SERPL-SCNC: 138 MMOL/L (ref 135–147)
SPECIMEN EXPIRATION DATE: NORMAL
WBC # BLD AUTO: 9.03 THOUSAND/UL (ref 4.31–10.16)

## 2024-01-11 PROCEDURE — 99285 EMERGENCY DEPT VISIT HI MDM: CPT | Performed by: EMERGENCY MEDICINE

## 2024-01-11 PROCEDURE — 85610 PROTHROMBIN TIME: CPT | Performed by: NURSE PRACTITIONER

## 2024-01-11 PROCEDURE — 94640 AIRWAY INHALATION TREATMENT: CPT

## 2024-01-11 PROCEDURE — 71045 X-RAY EXAM CHEST 1 VIEW: CPT

## 2024-01-11 PROCEDURE — C1769 GUIDE WIRE: HCPCS

## 2024-01-11 PROCEDURE — NC001 PR NO CHARGE: Performed by: FAMILY MEDICINE

## 2024-01-11 PROCEDURE — 94002 VENT MGMT INPAT INIT DAY: CPT

## 2024-01-11 PROCEDURE — 93005 ELECTROCARDIOGRAM TRACING: CPT

## 2024-01-11 PROCEDURE — 99152 MOD SED SAME PHYS/QHP 5/>YRS: CPT | Performed by: STUDENT IN AN ORGANIZED HEALTH CARE EDUCATION/TRAINING PROGRAM

## 2024-01-11 PROCEDURE — 49450 REPLACE G/C TUBE PERC: CPT

## 2024-01-11 PROCEDURE — C1894 INTRO/SHEATH, NON-LASER: HCPCS

## 2024-01-11 PROCEDURE — 86850 RBC ANTIBODY SCREEN: CPT

## 2024-01-11 PROCEDURE — 86900 BLOOD TYPING SEROLOGIC ABO: CPT

## 2024-01-11 PROCEDURE — 94664 DEMO&/EVAL PT USE INHALER: CPT

## 2024-01-11 PROCEDURE — 49450 REPLACE G/C TUBE PERC: CPT | Performed by: STUDENT IN AN ORGANIZED HEALTH CARE EDUCATION/TRAINING PROGRAM

## 2024-01-11 PROCEDURE — 96360 HYDRATION IV INFUSION INIT: CPT

## 2024-01-11 PROCEDURE — 94760 N-INVAS EAR/PLS OXIMETRY 1: CPT

## 2024-01-11 PROCEDURE — NC001 PR NO CHARGE: Performed by: NURSE PRACTITIONER

## 2024-01-11 PROCEDURE — 80048 BASIC METABOLIC PNL TOTAL CA: CPT

## 2024-01-11 PROCEDURE — 85025 COMPLETE CBC W/AUTO DIFF WBC: CPT

## 2024-01-11 PROCEDURE — 99285 EMERGENCY DEPT VISIT HI MDM: CPT

## 2024-01-11 PROCEDURE — 86901 BLOOD TYPING SEROLOGIC RH(D): CPT

## 2024-01-11 RX ORDER — LEVALBUTEROL INHALATION SOLUTION 1.25 MG/3ML
1.25 SOLUTION RESPIRATORY (INHALATION) EVERY 6 HOURS
Status: DISCONTINUED | OUTPATIENT
Start: 2024-01-11 | End: 2024-01-11

## 2024-01-11 RX ORDER — CEFTRIAXONE SODIUM 1 G/50ML
1000 INJECTION, SOLUTION INTRAVENOUS ONCE
Qty: 50 ML | Refills: 0 | Status: SHIPPED | OUTPATIENT
Start: 2024-01-11 | End: 2024-01-11

## 2024-01-11 RX ORDER — FORMOTEROL FUMARATE DIHYDRATE 20 UG/2ML
20 SOLUTION RESPIRATORY (INHALATION)
Status: DISCONTINUED | OUTPATIENT
Start: 2024-01-11 | End: 2024-01-12 | Stop reason: HOSPADM

## 2024-01-11 RX ORDER — FENTANYL CITRATE 50 UG/ML
INJECTION, SOLUTION INTRAMUSCULAR; INTRAVENOUS AS NEEDED
Status: DISCONTINUED | OUTPATIENT
Start: 2024-01-11 | End: 2024-01-12 | Stop reason: HOSPADM

## 2024-01-11 RX ORDER — SERTRALINE HYDROCHLORIDE 100 MG/1
100 TABLET, FILM COATED ORAL DAILY
Status: DISCONTINUED | OUTPATIENT
Start: 2024-01-12 | End: 2024-01-12 | Stop reason: HOSPADM

## 2024-01-11 RX ORDER — QUETIAPINE FUMARATE 25 MG/1
50 TABLET, FILM COATED ORAL 2 TIMES DAILY
Status: DISCONTINUED | OUTPATIENT
Start: 2024-01-12 | End: 2024-01-12 | Stop reason: HOSPADM

## 2024-01-11 RX ORDER — ALBUTEROL SULFATE 2.5 MG/3ML
5 SOLUTION RESPIRATORY (INHALATION) ONCE
Status: COMPLETED | OUTPATIENT
Start: 2024-01-11 | End: 2024-01-11

## 2024-01-11 RX ORDER — FAMOTIDINE 20 MG/1
20 TABLET, FILM COATED ORAL 2 TIMES DAILY
Status: DISCONTINUED | OUTPATIENT
Start: 2024-01-12 | End: 2024-01-11

## 2024-01-11 RX ORDER — GABAPENTIN 300 MG/1
300 CAPSULE ORAL 2 TIMES DAILY
Status: DISCONTINUED | OUTPATIENT
Start: 2024-01-12 | End: 2024-01-12 | Stop reason: HOSPADM

## 2024-01-11 RX ORDER — LANOLIN ALCOHOL/MO/W.PET/CERES
3 CREAM (GRAM) TOPICAL
Status: DISCONTINUED | OUTPATIENT
Start: 2024-01-11 | End: 2024-01-12 | Stop reason: HOSPADM

## 2024-01-11 RX ORDER — FOLIC ACID 1 MG/1
1 TABLET ORAL DAILY
Status: DISCONTINUED | OUTPATIENT
Start: 2024-01-12 | End: 2024-01-12 | Stop reason: HOSPADM

## 2024-01-11 RX ORDER — MIDAZOLAM HYDROCHLORIDE 2 MG/2ML
INJECTION, SOLUTION INTRAMUSCULAR; INTRAVENOUS AS NEEDED
Status: DISCONTINUED | OUTPATIENT
Start: 2024-01-11 | End: 2024-01-12 | Stop reason: HOSPADM

## 2024-01-11 RX ORDER — FAMOTIDINE 20 MG/1
20 TABLET, FILM COATED ORAL DAILY
Status: DISCONTINUED | OUTPATIENT
Start: 2024-01-12 | End: 2024-01-12 | Stop reason: HOSPADM

## 2024-01-11 RX ORDER — ONDANSETRON 4 MG/1
4 TABLET, ORALLY DISINTEGRATING ORAL EVERY 8 HOURS SCHEDULED
Status: DISCONTINUED | OUTPATIENT
Start: 2024-01-11 | End: 2024-01-12 | Stop reason: HOSPADM

## 2024-01-11 RX ORDER — BUSPIRONE HYDROCHLORIDE 10 MG/1
30 TABLET ORAL 3 TIMES DAILY
Status: DISCONTINUED | OUTPATIENT
Start: 2024-01-11 | End: 2024-01-12 | Stop reason: HOSPADM

## 2024-01-11 RX ORDER — LIDOCAINE WITH 8.4% SOD BICARB 0.9%(10ML)
SYRINGE (ML) INJECTION AS NEEDED
Status: DISCONTINUED | OUTPATIENT
Start: 2024-01-11 | End: 2024-01-12 | Stop reason: HOSPADM

## 2024-01-11 RX ORDER — LEVALBUTEROL INHALATION SOLUTION 1.25 MG/3ML
1.25 SOLUTION RESPIRATORY (INHALATION)
Status: DISCONTINUED | OUTPATIENT
Start: 2024-01-12 | End: 2024-01-12 | Stop reason: HOSPADM

## 2024-01-11 RX ORDER — ACETAMINOPHEN 325 MG/1
650 TABLET ORAL EVERY 8 HOURS PRN
Status: DISCONTINUED | OUTPATIENT
Start: 2024-01-11 | End: 2024-01-12 | Stop reason: HOSPADM

## 2024-01-11 RX ORDER — LANOLIN ALCOHOL/MO/W.PET/CERES
1 CREAM (GRAM) TOPICAL 2 TIMES DAILY WITH MEALS
Status: DISCONTINUED | OUTPATIENT
Start: 2024-01-12 | End: 2024-01-12 | Stop reason: HOSPADM

## 2024-01-11 RX ORDER — GABAPENTIN 300 MG/1
300 CAPSULE ORAL 3 TIMES DAILY
Status: DISCONTINUED | OUTPATIENT
Start: 2024-01-11 | End: 2024-01-11

## 2024-01-11 RX ADMIN — IPRATROPIUM BROMIDE 0.5 MG: 0.5 SOLUTION RESPIRATORY (INHALATION) at 14:20

## 2024-01-11 RX ADMIN — Medication 20 ML: at 17:55

## 2024-01-11 RX ADMIN — FENTANYL CITRATE 25 MCG: 50 INJECTION INTRAMUSCULAR; INTRAVENOUS at 17:53

## 2024-01-11 RX ADMIN — ALBUTEROL SULFATE 5 MG: 2.5 SOLUTION RESPIRATORY (INHALATION) at 14:20

## 2024-01-11 RX ADMIN — IPRATROPIUM BROMIDE 0.5 MG: 0.5 SOLUTION RESPIRATORY (INHALATION) at 23:19

## 2024-01-11 RX ADMIN — IOHEXOL 12 ML: 300 INJECTION, SOLUTION INTRAVENOUS at 18:18

## 2024-01-11 RX ADMIN — LEVALBUTEROL HYDROCHLORIDE 1.25 MG: 1.25 SOLUTION RESPIRATORY (INHALATION) at 23:19

## 2024-01-11 RX ADMIN — MIDAZOLAM 0.5 MG: 1 INJECTION INTRAMUSCULAR; INTRAVENOUS at 17:53

## 2024-01-11 RX ADMIN — SODIUM CHLORIDE 500 ML: 0.9 INJECTION, SOLUTION INTRAVENOUS at 15:20

## 2024-01-11 NOTE — TELEPHONE ENCOUNTER
1/11/2024 10:13 AM -  Pt pulled PEG.  Family unable to replace.  Ventilating well, oxygenating well.  Other issues in home include need for trach exchange, and need supply of humidifier fluid for trach collar.  Pt also borderline on her hgb, and may benefit from tfs.      Given the culmination of above issues, and the inabilty for family to replace her trach nor PEG in home, we advise ED visit for specialist consultation and evaluation.  In addition, if any blood product is needed, this may be best supplied in ED, given pt's respiratory needs exceed that of the usual [place] infusion centers.      [facility] involved to provide Xport, as pt has need for medical transport with her blow-by O2 support over trach, as well as general medical frailty.  Pt will not use [facility], given a previous traumatic experience in that hospital.  Therefore, Xport arranged to [facility] ED.  ADT order placed.  REport to be called from myself to ED attending.

## 2024-01-11 NOTE — ED PROVIDER NOTES
History  Chief Complaint   Patient presents with    Altered Mental Status     Per ems pt was confused for hospice home health aide, also reports brown tinged sputum suctioned from trach, pt ripped out PEG overnight, Pt A+O x3 on arrival disoriented to time      HPI    Patient is a 69 y/o F with PMH diffuse b cell lymphoma presenting with multiple complaints. Care was discussed with patient's palliative care physician Dr. Sutherland. Pt here for three reasons. She pulled her PEG tube accidentally last night. No abdominal pain. Uses sparingly, sometimes for medications but otherwise tolerates PO. She also may need blood transfusion per palliative care. They also request trach exchange due to leaking around the trach site. State she has brown tinged sputum suctioning from trach.     Prior to Admission Medications   Prescriptions Last Dose Informant Patient Reported? Taking?   Calcium Carb-Cholecalciferol (OSCAL-D) 500 mg-200 units per tablet  Self Yes No   Sig: Take 1 tablet by mouth 2 (two) times a day with meals   QUEtiapine (SEROquel) 50 mg tablet   No No   Si tablet (50 mg total) by Per G Tube route 2 (two) times a day   acetaminophen (TYLENOL) 650 mg CR tablet  Self No No   Sig: Take 1 tablet (650 mg total) by mouth every 8 (eight) hours as needed for moderate pain   apixaban (ELIQUIS) 5 mg   No No   Sig: Take 1 tablet (5 mg total) by mouth 2 (two) times a day   budesonide (PULMICORT) 90 MCG/ACT inhaler   No No   Sig: Inhale 1 puff 2 (two) times a day Rinse mouth after use.   busPIRone (BUSPAR) 30 MG tablet   No No   Sig: TAKE 1 TABLET BY MOUTH 3 TIMES A DAY.   famotidine (PEPCID) 20 mg tablet   No No   Sig: Take 1 tablet (20 mg total) by mouth 2 (two) times a day   folic acid (FOLVITE) 1 mg tablet   No No   Si tablet (1 mg total) by Per G Tube route daily   formoterol (PERFOROMIST) 20 MCG/2ML nebulizer solution   No No   Sig: Take 2 mL (20 mcg total) by nebulization every 12 (twelve) hours   gabapentin  (NEURONTIN) 300 mg capsule   No No   Si capsule (300 mg total) by Per G Tube route 3 (three) times a day   ipratropium (ATROVENT) 0.02 % nebulizer solution   No No   Sig: Take 2.5 mL (0.5 mg total) by nebulization every 6 (six) hours   levalbuterol (XOPENEX) 1.25 mg/3 mL nebulizer solution   No No   Sig: Take 3 mL (1.25 mg total) by nebulization every 6 (six) hours   melatonin 3 mg   No No   Si tablet (3 mg total) by Per G Tube route daily at bedtime   metoprolol tartrate (LOPRESSOR) 25 mg tablet   No No   Si tablet (25 mg total) by Per G Tube route every 12 (twelve) hours   naloxone (NARCAN) 4 mg/0.1 mL nasal spray   No No   Sig: Administer 1 spray into a nostril. If no response after 2-3 minutes, give another dose in the other nostril using a new spray.   nicotine (NICODERM CQ) 7 mg/24hr TD 24 hr patch   No No   Sig: Place 1 patch on the skin over 24 hours every 24 hours   ondansetron (ZOFRAN-ODT) 4 mg disintegrating tablet   No No   Sig: Take 1 tablet (4 mg total) by mouth every 8 (eight) hours   oxyCODONE (Roxicodone) 5 immediate release tablet   No No   Si tablet (5 mg total) by Per G Tube route 2 (two) times a day as needed for moderate pain or severe pain Max Daily Amount: 10 mg   oxygen gas   Yes No   Sig: Inhale 12 L/min continuous. pt on ventilator - o2 mask   Indications: Tracheostomy   sertraline (ZOLOFT) 100 mg tablet   No No   Si tablet (100 mg total) by Per PEG Tube route daily   sodium chloride, PF, 0.9 %   No No   Sig: Inject 5 mL into a catheter in a vein 2 (two) times a day as needed for line care      Facility-Administered Medications: None       Past Medical History:   Diagnosis Date    Anxiety     Depression     Fatty liver     Hyperlipidemia     Hypertension     Psychiatric disorder     Varicella        Past Surgical History:   Procedure Laterality Date    BREAST SURGERY Bilateral     Reduction Procedure    CARDIAC SURGERY       SECTION      x4    DILATION AND  CURETTAGE OF UTERUS      ECTOPIC PREGNANCY SURGERY      IR GASTROSTOMY (G) TUBE CHECK/CHANGE/REINSERTION/UPSIZE  1/11/2024    IR GASTROSTOMY TUBE PLACEMENT  9/27/2023    IR TUNNELED CENTRAL LINE PLACEMENT  10/19/2023    WI BRNCHSC INCL FLUOR GDNCE DX W/CELL WASHG SPX N/A 9/17/2023    Procedure: BRONCHOSCOPY FLEXIBLE;  Surgeon: Joshua Guerra MD;  Location: [facility];  Service: ENT    TRACHEOSTOMY N/A 9/17/2023    Procedure: TRACHEOSTOMY, biopsy of nasopharynx mass;  Surgeon: Joshua Guerra MD;  Location: [facility];  Service: ENT       Family History   Problem Relation Age of Onset    Lung cancer Mother     Cirrhosis Father     Hypertension Sister     Bipolar disorder Sister     Heart disease Sister     Diabetes Family     Heart disease Family     Hypertension Family     Stroke Family     Thyroid disease Family      I have reviewed and agree with the history as documented.    E-Cigarette/Vaping    E-Cigarette Use Never User      E-Cigarette/Vaping Substances    Nicotine No     THC No     CBD No     Flavoring No     Other No     Unknown No      Social History     Tobacco Use    Smoking status: Every Day     Current packs/day: 1.00     Types: Cigarettes    Smokeless tobacco: Never    Tobacco comments:     2 Black and milds per day   Vaping Use    Vaping status: Never Used   Substance Use Topics    Alcohol use: Not Currently    Drug use: Yes     Types: Marijuana     Comment: for pain        Review of Systems   Constitutional:  Negative for chills and fever.   HENT:  Negative for ear pain and sore throat.    Eyes:  Negative for pain and visual disturbance.   Respiratory:  Negative for cough and shortness of breath.    Cardiovascular:  Negative for chest pain and palpitations.   Gastrointestinal:  Negative for abdominal pain and vomiting.   Genitourinary:  Negative for dysuria and hematuria.   Musculoskeletal:  Negative for arthralgias and back pain.   Skin:  Negative for color change and rash.   Neurological:  Negative  for seizures and syncope.   All other systems reviewed and are negative.      Physical Exam  ED Triage Vitals   Temperature Pulse Respirations Blood Pressure SpO2   01/11/24 1222 01/11/24 1220 01/11/24 1220 01/11/24 1220 01/11/24 1220   (!) 97.4 °F (36.3 °C) 83 20 (!) 175/120 100 %      Temp Source Heart Rate Source Patient Position - Orthostatic VS BP Location FiO2 (%)   01/11/24 1222 01/12/24 0100 01/12/24 0100 01/12/24 0100 01/11/24 1845   Oral Monitor Lying Left arm 24      Pain Score       01/11/24 1220       No Pain             Orthostatic Vital Signs  Vitals:    01/12/24 0930 01/12/24 1111 01/12/24 1301 01/12/24 1500   BP: 165/97 93/54 (!) 174/95 (!) 169/109   Pulse: 80 82 86 90   Patient Position - Orthostatic VS: Lying          Physical Exam  Vitals and nursing note reviewed.   Constitutional:       General: She is not in acute distress.     Appearance: She is well-developed.   HENT:      Head: Normocephalic and atraumatic.      Mouth/Throat:      Mouth: Mucous membranes are moist.   Eyes:      Conjunctiva/sclera: Conjunctivae normal.      Pupils: Pupils are equal, round, and reactive to light.   Cardiovascular:      Rate and Rhythm: Normal rate and regular rhythm.      Heart sounds: No murmur heard.  Pulmonary:      Effort: Pulmonary effort is normal. No respiratory distress.      Breath sounds: Wheezing present.      Comments: Trach in place on pressure support tolerating well sat 100%  Abdominal:      General: There is no distension.      Palpations: Abdomen is soft.      Tenderness: There is no abdominal tenderness.      Comments: PEG site not draining, no redness/discharge. Abdomen soft nondistended   Musculoskeletal:         General: No swelling.      Cervical back: Neck supple.   Skin:     General: Skin is warm and dry.      Capillary Refill: Capillary refill takes less than 2 seconds.   Neurological:      Mental Status: She is alert and oriented to person, place, and time.      Cranial Nerves: No  cranial nerve deficit.   Psychiatric:         Mood and Affect: Mood normal.         Behavior: Behavior normal.         ED Medications  Medications   albuterol inhalation solution 5 mg (5 mg Nebulization Given 1/11/24 1420)   ipratropium (ATROVENT) 0.02 % inhalation solution 0.5 mg (0.5 mg Nebulization Given 1/11/24 1420)   sodium chloride 0.9 % bolus 500 mL (0 mL Intravenous Stopped 1/11/24 1624)   iohexol (OMNIPAQUE) 300 mg/mL injection 50 mL (12 mL Intravenous Given 1/11/24 1818)       Diagnostic Studies  Results Reviewed       Procedure Component Value Units Date/Time    Urine culture [[id number]] Collected: 01/12/24 0620    Lab Status: Final result Specimen: Urine, Clean Catch Updated: 01/13/24 1010     Urine Culture 10,000-19,000 cfu/ml    Urine Microscopic [[id number]]  (Abnormal) Collected: 01/12/24 0620    Lab Status: Final result Specimen: Urine, Clean Catch Updated: 01/12/24 0841     RBC, UA 1-2 /hpf      WBC, UA 30-50 /hpf      Epithelial Cells Occasional /hpf      Bacteria, UA None Seen /hpf      MUCUS THREADS Occasional     Hyaline Casts, UA 5-10 /lpf     UA w Reflex to Microscopic w Reflex to Culture [[id number]]  (Abnormal) Collected: 01/12/24 0620    Lab Status: Final result Specimen: Urine, Clean Catch Updated: 01/12/24 0836     Color, UA Yellow     Clarity, UA Clear     Specific Gravity, UA 1.013     pH, UA 5.5     Leukocytes, UA Moderate     Nitrite, UA Negative     Protein, UA 50 (1+) mg/dl      Glucose, UA Negative mg/dl      Ketones, UA Negative mg/dl      Urobilinogen, UA <2.0 mg/dl      Bilirubin, UA Negative     Occult Blood, UA Negative    Procalcitonin [[id number]]  (Normal) Collected: 01/12/24 0411    Lab Status: Final result Specimen: Blood from Central Venous Line Updated: 01/12/24 0452     Procalcitonin 0.14 ng/ml     Comprehensive metabolic panel [[id number]]  (Abnormal) Collected: 01/12/24 0411    Lab Status: Final result Specimen: Blood from Central Venous Line Updated: 01/12/24 0444      Sodium 141 mmol/L      Potassium 5.1 mmol/L      Chloride 107 mmol/L      CO2 27 mmol/L      ANION GAP 7 mmol/L      BUN 28 mg/dL      Creatinine 2.21 mg/dL      Glucose 93 mg/dL      Glucose, Fasting 93 mg/dL      Calcium 9.6 mg/dL      Corrected Calcium 10.5 mg/dL      AST 10 U/L      ALT 7 U/L      Alkaline Phosphatase 65 U/L      Total Protein 6.1 g/dL      Albumin 2.9 g/dL      Total Bilirubin 0.44 mg/dL      eGFR 21 ml/min/1.73sq m     Narrative:      National Kidney Disease Foundation guidelines for Chronic Kidney Disease (CKD):     Stage 1 with normal or high GFR (GFR > 90 mL/min/1.73 square meters)    Stage 2 Mild CKD (GFR = 60-89 mL/min/1.73 square meters)    Stage 3A Moderate CKD (GFR = 45-59 mL/min/1.73 square meters)    Stage 3B Moderate CKD (GFR = 30-44 mL/min/1.73 square meters)    Stage 4 Severe CKD (GFR = 15-29 mL/min/1.73 square meters)    Stage 5 End Stage CKD (GFR <15 mL/min/1.73 square meters)  Note: GFR calculation is accurate only with a steady state creatinine    Magnesium [[id number]]  (Normal) Collected: 01/12/24 0411    Lab Status: Final result Specimen: Blood from Central Venous Line Updated: 01/12/24 0443     Magnesium 2.1 mg/dL     Phosphorus [[id number]]  (Abnormal) Collected: 01/12/24 0411    Lab Status: Final result Specimen: Blood from Central Venous Line Updated: 01/12/24 0443     Phosphorus 4.3 mg/dL     CBC and differential [[id number]]  (Abnormal) Collected: 01/12/24 0411    Lab Status: Final result Specimen: Blood from Central Venous Line Updated: 01/12/24 0420     WBC 9.03 Thousand/uL      RBC 3.07 Million/uL      Hemoglobin 9.2 g/dL      Hematocrit 31.0 %       fL      MCH 30.0 pg      MCHC 29.7 g/dL      RDW 19.8 %      MPV 9.5 fL      Platelets 236 Thousands/uL      nRBC 0 /100 WBCs      Neutrophils Relative 73 %      Immat GRANS % 0 %      Lymphocytes Relative 12 %      Monocytes Relative 8 %      Eosinophils Relative 6 %      Basophils Relative 1 %      Neutrophils  Absolute 6.56 Thousands/µL      Immature Grans Absolute 0.04 Thousand/uL      Lymphocytes Absolute 1.07 Thousands/µL      Monocytes Absolute 0.75 Thousand/µL      Eosinophils Absolute 0.52 Thousand/µL      Basophils Absolute 0.09 Thousands/µL     Basic metabolic panel [[id number]]  (Abnormal) Collected: 01/11/24 1331    Lab Status: Final result Specimen: Blood from Central Venous Line Updated: 01/11/24 1414     Sodium 138 mmol/L      Potassium 5.0 mmol/L      Chloride 104 mmol/L      CO2 28 mmol/L      ANION GAP 6 mmol/L      BUN 28 mg/dL      Creatinine 2.54 mg/dL      Glucose 109 mg/dL      Calcium 10.1 mg/dL      eGFR 18 ml/min/1.73sq m     Narrative:      National Kidney Disease Foundation guidelines for Chronic Kidney Disease (CKD):     Stage 1 with normal or high GFR (GFR > 90 mL/min/1.73 square meters)    Stage 2 Mild CKD (GFR = 60-89 mL/min/1.73 square meters)    Stage 3A Moderate CKD (GFR = 45-59 mL/min/1.73 square meters)    Stage 3B Moderate CKD (GFR = 30-44 mL/min/1.73 square meters)    Stage 4 Severe CKD (GFR = 15-29 mL/min/1.73 square meters)    Stage 5 End Stage CKD (GFR <15 mL/min/1.73 square meters)  Note: GFR calculation is accurate only with a steady state creatinine    Protime-INR [[id number]]  (Abnormal) Collected: 01/11/24 1331    Lab Status: Final result Specimen: Blood from Line Updated: 01/11/24 1357     Protime 21.4 seconds      INR 1.89    CBC and differential [[id number]]  (Abnormal) Collected: 01/11/24 1331    Lab Status: Final result Specimen: Blood from Central Venous Line Updated: 01/11/24 1347     WBC 9.03 Thousand/uL      RBC 3.19 Million/uL      Hemoglobin 9.5 g/dL      Hematocrit 32.2 %       fL      MCH 29.8 pg      MCHC 29.5 g/dL      RDW 19.9 %      MPV 9.6 fL      Platelets 263 Thousands/uL      nRBC 0 /100 WBCs      Neutrophils Relative 76 %      Immat GRANS % 1 %      Lymphocytes Relative 10 %      Monocytes Relative 7 %      Eosinophils Relative 5 %      Basophils  "Relative 1 %      Neutrophils Absolute 6.87 Thousands/µL      Immature Grans Absolute 0.05 Thousand/uL      Lymphocytes Absolute 0.94 Thousands/µL      Monocytes Absolute 0.64 Thousand/µL      Eosinophils Absolute 0.43 Thousand/µL      Basophils Absolute 0.10 Thousands/µL                    IR gastrostomy (G) tube check/change/reinsertion/upsize   Final Result by Bishnu Crews MD (01/12 1719)   Successful fluoroscopy-guided replacement of completely dislodged gastrostomy tube with near complete closure of the gastrostomy stoma, requiring recanalization and serial dilation of the stoma tract.      Workstation performed: [id number]         XR chest 1 view portable   Final Result by Rogelio Menon MD (01/11 1701)      1. Possible acute on chronic aspiration-pneumonia related changes in the lung bases.      2. Cardiomegaly and mild interstitial pulmonary edema. Possible small left pleural effusion.         Workstation performed: [id number]         FL barium swallow video w speech    (Results Pending)         Procedures  Procedures  Conscious Sedation Assessment      Flowsheet Row Classification Score   ASA Scale Assessment 3-Severe systemic disease that results in functional limitation filed at 01/11/2024 1726   Mallampati Classification Class IV: hard palate, only visible - Severe Difficulty filed at 01/11/2024 1726            ED Course  ED Course as of 01/13/24 1053   Thu Jan 11, 2024   1350 Hemoglobin(!): 9.5   1611 D/w IR, will be \"order of hours\" before procedure   1657 Called to pt room. Pt very upset about how long it is taking to get G tube placed. Pt was placing her clothes back on and popped off trach connect to vent. Was able to verbally de-escalate and pt now agreeable to go to IR. Daughter was contacted and updated   1711 XR chest 1 view portable  Pt has no fever, cough, is in no distress. Pt most likely chronically aspirates, do not feel would benefit from antibiotic course, more likely " pneumonitis picture   1858 Pt re-assessed, still waking up from sedation. Trach still not replaced, RT to do hopefully in the next 30 minutes. Pt to be discharged once more awake and trach exchanged                                       Medical Decision Making  67 y/o F presenting as palliative care pt for request of PEG placement, trach exchange, possible need for blood transfusion. Pt is ok and comfortable with these procedures being performed. VSS. Discussed initially with ENT however they decline trach exchange stating it can be done by RT at bedside. Pt has 7.0 cuffed Shiley that will be exchanged. Consulted IR for PEG replacement. 1x breathing tx given for wheezing and hx of COPD. IR successfully replaced gastrostomy. Difficulty in finding 7.0 Shiley to replace pts current trach. Hgb stable no transfusion needed. Will attempt to replace Shiley with 6.5 cuffed but may need to keep current trach and exchange at a later time. Pt signed out prior to final disposition, on review pt required admission due to inability to place trach in ED at bedside.    Amount and/or Complexity of Data Reviewed  Labs: ordered. Decision-making details documented in ED Course.  Radiology: ordered. Decision-making details documented in ED Course.    Risk  Prescription drug management.          Disposition  Final diagnoses:   Tracheostomy in place (HCC)   Dislodged gastrostomy tube   Large cell lymphoma (HCC)   Benign essential hypertension   Aspiration pneumonia (HCC)     Time reflects when diagnosis was documented in both MDM as applicable and the Disposition within this note       Time User Action Codes Description Comment    1/11/2024  1:30 PM Kirk Gerber Add [Z93.0] Tracheostomy in place (HCC)     1/11/2024  6:42 PM Kirk Gerber Add [T85.528A] Dislodged gastrostomy tube     1/11/2024  6:42 PM Kirk Gerber Add [C85.80] Large cell lymphoma (HCC)     1/11/2024  6:42 PM Kirk Gerber Add [I10] Benign essential hypertension      1/11/2024  9:09 PM Chai Cancino Add [J69.0] Aspiration pneumonia ([facility])     1/11/2024  9:53 PM Malissa Soni Add [Z93.1] Gastrostomy tube in place ([facility])     1/11/2024  9:53 PM Malissa Soni Add [G89.4] Pain syndrome, chronic     1/12/2024  1:14 AM Malissa Soni Add [J95.03] Tracheostomy malfunction ([facility])     1/12/2024  2:52 PM Deepti Garland Add [J39.2] Oropharyngeal mass     1/12/2024  2:52 PM Deepti Garland Add [R13.10] Dysphagia           ED Disposition       ED Disposition   Discharge    Condition   --    Date/Time   Fri Jan 12, 2024 1617    Comment   Discharged to home               Follow-up Information       Follow up With Specialties Details Why Contact Info    Joshua Guerra MD Otolaryngology Follow up in 1 month(s)  [address]  [address]  [facility] [id number] [phone]              Discharge Medication List as of 1/11/2024  6:42 PM        CONTINUE these medications which have NOT CHANGED    Details   acetaminophen (TYLENOL) 650 mg CR tablet Take 1 tablet (650 mg total) by mouth every 8 (eight) hours as needed for moderate pain, Starting Fri 8/20/2021, Normal      apixaban (ELIQUIS) 5 mg Take 1 tablet (5 mg total) by mouth 2 (two) times a day, Starting Thu 1/4/2024, Until Sat 2/3/2024, Normal      budesonide (PULMICORT) 90 MCG/ACT inhaler Inhale 1 puff 2 (two) times a day Rinse mouth after use., Starting Wed 12/20/2023, Normal      busPIRone (BUSPAR) 30 MG tablet TAKE 1 TABLET BY MOUTH 3 TIMES A DAY., Starting Tue 9/19/2023, Normal      Calcium Carb-Cholecalciferol (OSCAL-D) 500 mg-200 units per tablet Take 1 tablet by mouth 2 (two) times a day with meals, Historical Med      famotidine (PEPCID) 20 mg tablet Take 1 tablet (20 mg total) by mouth 2 (two) times a day, Starting Thu 9/7/2023, Normal      folic acid (FOLVITE) 1 mg tablet 1 tablet (1 mg total) by Per G Tube route daily, Starting Wed 12/13/2023, Normal      formoterol (PERFOROMIST) 20 MCG/2ML nebulizer  solution Take 2 mL (20 mcg total) by nebulization every 12 (twelve) hours, Starting Wed 12/20/2023, Until Mon 6/17/2024, Normal      gabapentin (NEURONTIN) 300 mg capsule 1 capsule (300 mg total) by Per G Tube route 3 (three) times a day, Starting Wed 12/13/2023, Normal      ipratropium (ATROVENT) 0.02 % nebulizer solution Take 2.5 mL (0.5 mg total) by nebulization every 6 (six) hours, Starting Tue 12/26/2023, Until Thu 1/25/2024, Normal      levalbuterol (XOPENEX) 1.25 mg/3 mL nebulizer solution Take 3 mL (1.25 mg total) by nebulization every 6 (six) hours, Starting Tue 12/19/2023, Until Thu 1/18/2024, Normal      melatonin 3 mg 1 tablet (3 mg total) by Per G Tube route daily at bedtime, Starting Wed 12/13/2023, Normal      metoprolol tartrate (LOPRESSOR) 25 mg tablet 1 tablet (25 mg total) by Per G Tube route every 12 (twelve) hours, Starting Wed 12/13/2023, Normal      naloxone (NARCAN) 4 mg/0.1 mL nasal spray Administer 1 spray into a nostril. If no response after 2-3 minutes, give another dose in the other nostril using a new spray., Normal      nicotine (NICODERM CQ) 7 mg/24hr TD 24 hr patch Place 1 patch on the skin over 24 hours every 24 hours, Starting Wed 12/13/2023, Normal      ondansetron (ZOFRAN-ODT) 4 mg disintegrating tablet Take 1 tablet (4 mg total) by mouth every 8 (eight) hours, Starting Thu 12/7/2023, Normal      oxyCODONE (Roxicodone) 5 immediate release tablet 1 tablet (5 mg total) by Per G Tube route 2 (two) times a day as needed for moderate pain or severe pain Max Daily Amount: 10 mg, Starting Wed 12/13/2023, Normal      oxygen gas Inhale 12 L/min continuous. pt on ventilator - o2 mask   Indications: Tracheostomy, Historical Med      QUEtiapine (SEROquel) 50 mg tablet 1 tablet (50 mg total) by Per G Tube route 2 (two) times a day, Starting Wed 12/13/2023, Normal      sertraline (ZOLOFT) 100 mg tablet 1 tablet (100 mg total) by Per PEG Tube route daily, Starting Wed 12/13/2023, Normal       sodium chloride, PF, 0.9 % Inject 5 mL into a catheter in a vein 2 (two) times a day as needed for line care, Starting Mon 12/4/2023, Until Wed 1/3/2024 at 2359, Normal           Outpatient Discharge Orders   FL barium swallow video w speech   Standing Status: Future Standing Exp. Date: 01/12/28     G Tube Home Care Instructions       PDMP Review         Value Time User    PDMP Reviewed  Yes 12/28/2023  1:26 PM Chai Sutherland MD             ED Provider  Attending physically available and evaluated Lilia Ross. I managed the patient along with the ED Attending.    Electronically Signed by           Kirk Gerber MD  01/13/24 1782

## 2024-01-11 NOTE — CONSULTS
e-Consult (IPC)  - Interventional Radiology  Lilia Ross 68 y.o. female MRN: [id number]  Unit/Bed#: ED 27 Encounter: [id number]      Interventional Radiology has been consulted to evaluate Lilia Ross    We were consulted by emergency department concerning this patient with completely dislodged gastrostomy tube.    Inpatient Consult to IR  Consult performed by: RANDEE Chahal  Consult ordered by: Dwaine Guerrero MD        01/11/24    Assessment/Recommendation:     68-year-old female with a history of diffuse large B-cell lymphoma involving oropharynx with local invasion and extension status post insertion of 16 [place] gastrostomy tube placed 9/27/2023.  PEG tube is utilized mostly for medications.    Unfortunate, overnight gastrostomy tube was completely dislodged.  Emergency department attempted to reinsert but reports stoma to be completely closed with the inability to replace tube.    -Plan for IR tube check and hopeful reinsertion of 16 [place] gastrostomy tube today.  Time to be determined based on IR availability.  -Remainder of care per primary service team  -Please do not hesitate to contact interventional radiology for concerns or questions.    11-20 minutes, >50% of the total time devoted to medical consultative verbal/EMR discussion between providers. Written report will be generated in the EMR.     Thank you for allowing Interventional Radiology to participate in the care of Lilia Ross.     RANDEE Chahal

## 2024-01-11 NOTE — BRIEF OP NOTE (RAD/CATH)
INTERVENTIONAL RADIOLOGY PROCEDURE NOTE    Date: 1/11/2024    Procedure:   Procedure Summary       Date:  Room / Location:     Anesthesia Start:  Anesthesia Stop:     Procedure:  Diagnosis:     Scheduled Providers:  Responsible Provider:     Anesthesia Type: Not recorded ASA Status: Not recorded          Preoperative diagnosis:   1. Tracheostomy in place (HCC)      Postoperative diagnosis: Same.    Surgeon: Bishnu Crews MD     Assistant: None. No qualified resident was available.    Blood loss: Minimal    Specimens: None     Findings:   Gastrostomy site was closed at the skin.  The tract was successfully recanalized with a Kumpe catheter.    The tract had closed significantly in the absence of a tube; it was serially dilated and a 16 Fr gastrostomy tube (same size as her original tube) was successfully replaced.    Tube is ready for immediate use.    Complications: None immediate.    Anesthesia: conscious sedation

## 2024-01-11 NOTE — DISCHARGE INSTRUCTIONS
Feeding Tube Use & Care     This handout explains how to properly care for feeding tubes and prevent problems like   irritation, infection, or clogging.     Basic Tips for Using a Feeding Tube      During feedings and when giving medications through the tube, sit up or position yourself so   your shoulders are higher than your stomach. Do not lie flat on your back. Sitting upright and   having your shoulders higher than your stomach can help reduce the risk for food accidentally   getting into the lungs (aspiration) and stomach acid and food backing up from the stomach   (reflux).      Store unopened formula for tube feeding at room temperature. Do not let it freeze.      Opened cans or containers of formula should be labeled with the date that it was opened,   covered and stored in the refrigerator. Proper storage will reduce the risk for contamination   and prevent spoilage. Throw away all unused formula from opened cans or containers after 24   hours.      If the formula is stored in the refrigerator, allow the formula to stand at room temperature   for 15 to 20 minutes before a feeding. Very cold liquids may upset the stomach.      Always wash your hands with mild soap and warm water before touching the feeding   equipment, the formula, or giving medications.      Keep the tube and the area around the tube clean.      Always flush the feeding tube with water before and after each feeding, and before and after   administering medications. Flushing the tube keeps it clean and prevents the tube from   clogging.      Call your health care team if you have diarrhea, constipation, nausea, vomiting, or skin   irritation.                      How to Clean Around Your Feeding Tube     Follow these directions every day to clean around your feeding tube:     1. Wash your hands with mild soap and water and dry them with a clean towel.     2. Wet a soft, clean cloth or gauze with warm, soapy water.     3. Gently wipe the  skin around the tube with the cloth. Also clean the base of the tube.     4. Carefully clean the skin under the bumper with the cloth. Do not pull on the feeding   tube.     5. Look for redness, swelling, bleeding, or leakage around the tube. If you notice any of   these things, report them to your health care team immediately. Do not wait another   day.     6. Rinse the area you cleaned with clear, warm water. (It is also safe to rinse off in the   shower.)     7. Pat the area dry with another clean, soft cloth.     8. Apply a protective skin barrier or antibacterial ointment to the area around tube if   you have been told to do so by your health care team.     9. For PEG tubes or G-Tubes Only - Gently push the base of the tube against the skin and   twist the tube in a [place]. This step keeps the tube from sticking to the inside of the   stomach. Never twist a J-Tube or Jejunostomy tube.     10. When you are finished, wash your hands again with mild soap and water and dry   them with a clean towel.                                How to Flush Your Feeding Tube     To keep your tube clean and unclogged, you must flush it with warm water before and after   feedings, and before and after medications are administered. Flushes also help you stay   hydrated. If you are not currently using your feeding tube for feedings or medications, you will   need to flush your feeding tube with 60 mL water at least once a day to keep the feeding tube   clean and working.     Use the following steps to flush your feeding tube before and after feedings. (Instructions for   flushing your tube when administering medications are covered in the next section.)     1. Wash your hands with mild soap and water and dry them with a clean towel.     2. Fill a clean container with warm water.     3. Place the tip of a large feeding syringe in the water.     4. Draw 60 milliliters (mL) of water into the syringe.     5. Pinch or bend the end of the  feeding tube to keep the contents from leaking out.     6. Open the cap on the feeding tube.     7. Insert the tip of the syringe into the feeding tube.     8. Unbend the tube to allow the water to flow in.     9. Gently and slowly push the plunger of the syringe down.     10. When the syringe is empty, pinch or bend tube to keep contents from leaking out.   Then remove the syringe from the tube and close the cap on the feeding tube.     11. Tape the tube to your stomach with medical paper tape.     12. Wash your hands with mild soap and water and dry them with a clean towel.                      How to Administer Medications Through a Feeding   Tube     General Tips      Check with your health care team before you take any medication through a feeding tube.   Some medications should not be crushed, and a different prescription or form of the drug may   be needed for use in a feeding tube.      Do not mix medications together. Give each medication separately through the feeding tube   and flush the tube with 30 milliliters (mL) of warm water before and after each medication.      Solid medications must be crushed before given through your feeding tube. Thoroughly crush   the medication and dissolve it in 60 mL of water (see step 2 in the instructions below). Never   use tube feeding formula to dissolve a medication.     . Some medications should not be crushed, and a different prescription or form of   the drug may be needed for use in a feeding tube.   . Do not use the feeding tube for medications that should not be crushed (such as   time-release medications).        If you need to take medications at the same time as your tube feeding, give the medications   half-way through the feeding. Make sure your hands are clean before administering   medications. Remember to never mix medications with tube feeding formula.          Step-by-Step Instructions     Follow these directions for taking medications through a  feeding tube:     1. Prepare each liquid medication, and put it in a reachable but safe place.     2. Separately prepare each medication that needs to be crushed before it is   administered. Crush the medication and place it in 60 milliliters (mL) of warm water for   5 minutes before giving it through your feeding tube. Make sure the medication is   thoroughly dissolved in the water before giving it.       3. In a clean container, put enough warm water to flush the tube before and after each   medicine is given. You will need about ¼ cup of water for each medication you give   yourself.     4. To flush the feeding tube:     a. Pull the plunger out of your feeding syringe.     b. Bend or pinch the end of the feeding tube to prevent the contents from   leaking out.     c. Open the cap on the feeding tube.     d. Put the tip of the syringe into the tube.     e. Fill the feeding syringe with 30 mL of warm water.     f. Unbend the feeding tube and let the water run into your feeding tube.     5. After the tube is flushed, pour the liquid or crushed-and-diluted medication into the   feeding syringe.     6. Hold the syringe straight up and let the medication run into the feeding tube.     7. Repeat step 4 to flush your tube again.     8. Repeat steps 2 through 7 for each medication, giving water, then the medication,   then more water.     9. Remove the feeding syringe and close the feeding tube cap.     Procedural Sedation   WHAT YOU NEED TO KNOW:   Procedural sedation is medicine used during procedures to help you feel relaxed and calm. You will remember little to none of the procedure. After sedation you may feel tired, weak, or unsteady on your feet. You may also have trouble concentrating or short-term memory loss. These symptoms should go away in 24 hours or less.   DISCHARGE INSTRUCTIONS:     Call 911 or have someone else call for any of the following:   You have sudden trouble breathing.     You cannot be woken.       Contact Interventional Radiology at [phone]   ([place] PATIENTS: Contact Interventional Radiology at [phone]) ([place] PATIENTS: Contact Interventional Radiology at [phone]) if any of the following occur:     You have a severe headache or dizziness.     Your heart is beating faster than usual.    You have a fever or chills.     Your skin is itchy, swollen, or you have a rash.     You have nausea or are vomiting for more than 8 hours after the procedure.      You have questions or concerns about your condition or care.  Self-care:   Have someone stay with you for 24 hours. This person can drive you to errands and help you do things around the house. This person can also watch for problems.      Rest and do quiet activities for 24 hours. Do not exercise, ride a bike, or play sports. Stand up slowly to prevent dizziness and falls. Take short walks around the house with another person. Slowly return to your usual activities the next day.      Do not drive or use dangerous machines or tools for 24 hours. You may injure yourself or others. Examples include a lawnmower, saw, or drill. Do not return to work for 24 hours if you use dangerous machines or tools for work.      Do not make important decisions for 24 hours. For example, do not sign important papers or invest money.      Drink liquids as directed. Liquids help flush the sedation medicine out of your body. Ask how much liquid to drink each day and which liquids are best for you.      Eat small, frequent meals to prevent nausea and vomiting. Start with clear liquids such as juice or broth. If you do not vomit after clear liquids, you can eat your usual foods.      Do not drink alcohol or take medicines that make you drowsy. This includes medicines that help you sleep and anxiety medicines. Ask your healthcare provider if it is safe for you to take pain medicine.  Follow up with your healthcare provider as directed: Write down your questions so you  remember to ask them during your visits.

## 2024-01-11 NOTE — Clinical Note
Case was discussed with medicine and the patient's admission status was agreed to be Admission Status: observation status to the service of Dr. kline .

## 2024-01-11 NOTE — ED NOTES
Pt refusing to go to IR at this time. Provider made aware.      Reina Mathews, TINO  01/11/24 [id number]

## 2024-01-11 NOTE — TELEPHONE ENCOUNTER
Spoke to patient's daughter in-law, Poppy who reported patient removed feeding tube in the middle of the night and it cannot be put back in. Spoke to provider, who advised patient would need to go to ER. Contacted  transport to arrange transport to [facility]. Transport arrived and reported Critical Care Team would need to respond to patient's home due to complex care needs and patient stability.

## 2024-01-12 ENCOUNTER — DOCUMENTATION (OUTPATIENT)
Dept: HEMATOLOGY ONCOLOGY | Facility: CLINIC | Age: 69
End: 2024-01-12

## 2024-01-12 VITALS
WEIGHT: 175.93 LBS | DIASTOLIC BLOOD PRESSURE: 109 MMHG | HEART RATE: 90 BPM | OXYGEN SATURATION: 98 % | SYSTOLIC BLOOD PRESSURE: 169 MMHG | BODY MASS INDEX: 34.36 KG/M2 | TEMPERATURE: 97.4 F | RESPIRATION RATE: 12 BRPM

## 2024-01-12 PROBLEM — J69.0 ASPIRATION PNEUMONIA (HCC): Status: ACTIVE | Noted: 2024-01-12

## 2024-01-12 PROBLEM — Z86.711 HX OF PULMONARY EMBOLUS: Status: ACTIVE | Noted: 2024-01-12

## 2024-01-12 PROBLEM — J95.03 TRACHEOSTOMY MALFUNCTION (HCC): Status: ACTIVE | Noted: 2024-01-12

## 2024-01-12 LAB
ALBUMIN SERPL BCP-MCNC: 2.9 G/DL (ref 3.5–5)
ALP SERPL-CCNC: 65 U/L (ref 34–104)
ALT SERPL W P-5'-P-CCNC: 7 U/L (ref 7–52)
ANION GAP SERPL CALCULATED.3IONS-SCNC: 7 MMOL/L
AST SERPL W P-5'-P-CCNC: 10 U/L (ref 13–39)
ATRIAL RATE: 78 BPM
BACTERIA UR QL AUTO: ABNORMAL /HPF
BASOPHILS # BLD AUTO: 0.09 THOUSANDS/ÂΜL (ref 0–0.1)
BASOPHILS NFR BLD AUTO: 1 % (ref 0–1)
BILIRUB SERPL-MCNC: 0.44 MG/DL (ref 0.2–1)
BILIRUB UR QL STRIP: NEGATIVE
BUN SERPL-MCNC: 28 MG/DL (ref 5–25)
CALCIUM ALBUM COR SERPL-MCNC: 10.5 MG/DL (ref 8.3–10.1)
CALCIUM SERPL-MCNC: 9.6 MG/DL (ref 8.4–10.2)
CHLORIDE SERPL-SCNC: 107 MMOL/L (ref 96–108)
CLARITY UR: CLEAR
CO2 SERPL-SCNC: 27 MMOL/L (ref 21–32)
COLOR UR: YELLOW
CREAT SERPL-MCNC: 2.21 MG/DL (ref 0.6–1.3)
EOSINOPHIL # BLD AUTO: 0.52 THOUSAND/ÂΜL (ref 0–0.61)
EOSINOPHIL NFR BLD AUTO: 6 % (ref 0–6)
ERYTHROCYTE [DISTWIDTH] IN BLOOD BY AUTOMATED COUNT: 19.8 % (ref 11.6–15.1)
GFR SERPL CREATININE-BSD FRML MDRD: 21 ML/MIN/1.73SQ M
GLUCOSE P FAST SERPL-MCNC: 93 MG/DL (ref 65–99)
GLUCOSE SERPL-MCNC: 93 MG/DL (ref 65–140)
GLUCOSE UR STRIP-MCNC: NEGATIVE MG/DL
HCT VFR BLD AUTO: 31 % (ref 34.8–46.1)
HGB BLD-MCNC: 9.2 G/DL (ref 11.5–15.4)
HGB UR QL STRIP.AUTO: NEGATIVE
HYALINE CASTS #/AREA URNS LPF: ABNORMAL /LPF
IMM GRANULOCYTES # BLD AUTO: 0.04 THOUSAND/UL (ref 0–0.2)
IMM GRANULOCYTES NFR BLD AUTO: 0 % (ref 0–2)
KETONES UR STRIP-MCNC: NEGATIVE MG/DL
LEUKOCYTE ESTERASE UR QL STRIP: ABNORMAL
LYMPHOCYTES # BLD AUTO: 1.07 THOUSANDS/ÂΜL (ref 0.6–4.47)
LYMPHOCYTES NFR BLD AUTO: 12 % (ref 14–44)
MAGNESIUM SERPL-MCNC: 2.1 MG/DL (ref 1.9–2.7)
MCH RBC QN AUTO: 30 PG (ref 26.8–34.3)
MCHC RBC AUTO-ENTMCNC: 29.7 G/DL (ref 31.4–37.4)
MCV RBC AUTO: 101 FL (ref 82–98)
MONOCYTES # BLD AUTO: 0.75 THOUSAND/ÂΜL (ref 0.17–1.22)
MONOCYTES NFR BLD AUTO: 8 % (ref 4–12)
MUCOUS THREADS UR QL AUTO: ABNORMAL
NEUTROPHILS # BLD AUTO: 6.56 THOUSANDS/ÂΜL (ref 1.85–7.62)
NEUTS SEG NFR BLD AUTO: 73 % (ref 43–75)
NITRITE UR QL STRIP: NEGATIVE
NON-SQ EPI CELLS URNS QL MICRO: ABNORMAL /HPF
NRBC BLD AUTO-RTO: 0 /100 WBCS
P AXIS: 60 DEGREES
PH UR STRIP.AUTO: 5.5 [PH]
PHOSPHATE SERPL-MCNC: 4.3 MG/DL (ref 2.3–4.1)
PLATELET # BLD AUTO: 236 THOUSANDS/UL (ref 149–390)
PMV BLD AUTO: 9.5 FL (ref 8.9–12.7)
POTASSIUM SERPL-SCNC: 5.1 MMOL/L (ref 3.5–5.3)
PR INTERVAL: 192 MS
PROCALCITONIN SERPL-MCNC: 0.14 NG/ML
PROT SERPL-MCNC: 6.1 G/DL (ref 6.4–8.4)
PROT UR STRIP-MCNC: ABNORMAL MG/DL
QRS AXIS: 112 DEGREES
QRSD INTERVAL: 80 MS
QT INTERVAL: 374 MS
QTC INTERVAL: 426 MS
RBC # BLD AUTO: 3.07 MILLION/UL (ref 3.81–5.12)
RBC #/AREA URNS AUTO: ABNORMAL /HPF
SODIUM SERPL-SCNC: 141 MMOL/L (ref 135–147)
SP GR UR STRIP.AUTO: 1.01 (ref 1–1.03)
T WAVE AXIS: 135 DEGREES
UROBILINOGEN UR STRIP-ACNC: <2 MG/DL
VENTRICULAR RATE: 78 BPM
WBC # BLD AUTO: 9.03 THOUSAND/UL (ref 4.31–10.16)
WBC #/AREA URNS AUTO: ABNORMAL /HPF

## 2024-01-12 PROCEDURE — 83735 ASSAY OF MAGNESIUM: CPT

## 2024-01-12 PROCEDURE — L8501 TRACHEOSTOMY SPEAKING VALVE: HCPCS

## 2024-01-12 PROCEDURE — 31502 CHANGE OF WINDPIPE AIRWAY: CPT | Performed by: OTOLARYNGOLOGY

## 2024-01-12 PROCEDURE — 94003 VENT MGMT INPAT SUBQ DAY: CPT

## 2024-01-12 PROCEDURE — 84100 ASSAY OF PHOSPHORUS: CPT

## 2024-01-12 PROCEDURE — 84145 PROCALCITONIN (PCT): CPT

## 2024-01-12 PROCEDURE — NC001 PR NO CHARGE: Performed by: FAMILY MEDICINE

## 2024-01-12 PROCEDURE — 94640 AIRWAY INHALATION TREATMENT: CPT

## 2024-01-12 PROCEDURE — 92610 EVALUATE SWALLOWING FUNCTION: CPT

## 2024-01-12 PROCEDURE — 85025 COMPLETE CBC W/AUTO DIFF WBC: CPT

## 2024-01-12 PROCEDURE — 87086 URINE CULTURE/COLONY COUNT: CPT

## 2024-01-12 PROCEDURE — 99281 EMR DPT VST MAYX REQ PHY/QHP: CPT | Performed by: OTOLARYNGOLOGY

## 2024-01-12 PROCEDURE — 81001 URINALYSIS AUTO W/SCOPE: CPT

## 2024-01-12 PROCEDURE — 99238 HOSP IP/OBS DSCHRG MGMT 30/<: CPT | Performed by: FAMILY MEDICINE

## 2024-01-12 PROCEDURE — 92597 ORAL SPEECH DEVICE EVAL: CPT

## 2024-01-12 PROCEDURE — 94760 N-INVAS EAR/PLS OXIMETRY 1: CPT

## 2024-01-12 PROCEDURE — 80053 COMPREHEN METABOLIC PANEL: CPT

## 2024-01-12 RX ORDER — AMOXICILLIN AND CLAVULANATE POTASSIUM 875; 125 MG/1; MG/1
1 TABLET, FILM COATED ORAL EVERY 12 HOURS SCHEDULED
Qty: 10 TABLET | Refills: 0 | Status: SHIPPED | OUTPATIENT
Start: 2024-01-12 | End: 2024-01-17

## 2024-01-12 RX ORDER — SODIUM CHLORIDE 9 MG/ML
75 INJECTION, SOLUTION INTRAVENOUS CONTINUOUS
Status: DISCONTINUED | OUTPATIENT
Start: 2024-01-12 | End: 2024-01-12 | Stop reason: HOSPADM

## 2024-01-12 RX ADMIN — IPRATROPIUM BROMIDE 0.5 MG: 0.5 SOLUTION RESPIRATORY (INHALATION) at 13:08

## 2024-01-12 RX ADMIN — GABAPENTIN 300 MG: 300 CAPSULE ORAL at 09:29

## 2024-01-12 RX ADMIN — QUETIAPINE FUMARATE 50 MG: 25 TABLET ORAL at 09:29

## 2024-01-12 RX ADMIN — SERTRALINE 100 MG: 100 TABLET, FILM COATED ORAL at 09:29

## 2024-01-12 RX ADMIN — METOPROLOL TARTRATE 25 MG: 25 TABLET, FILM COATED ORAL at 09:29

## 2024-01-12 RX ADMIN — FOLIC ACID 1 MG: 1 TABLET ORAL at 09:29

## 2024-01-12 RX ADMIN — Medication 3 MG: at 00:04

## 2024-01-12 RX ADMIN — NICOTINE 1 PATCH: 7 PATCH, EXTENDED RELEASE TRANSDERMAL at 00:03

## 2024-01-12 RX ADMIN — LEVALBUTEROL HYDROCHLORIDE 1.25 MG: 1.25 SOLUTION RESPIRATORY (INHALATION) at 08:45

## 2024-01-12 RX ADMIN — BUSPIRONE HYDROCHLORIDE 30 MG: 10 TABLET ORAL at 00:03

## 2024-01-12 RX ADMIN — Medication 1 TABLET: at 09:29

## 2024-01-12 RX ADMIN — APIXABAN 5 MG: 5 TABLET, FILM COATED ORAL at 09:29

## 2024-01-12 RX ADMIN — FORMOTEROL FUMARATE DIHYDRATE 20 MCG: 20 SOLUTION RESPIRATORY (INHALATION) at 08:53

## 2024-01-12 RX ADMIN — FAMOTIDINE 20 MG: 20 TABLET, FILM COATED ORAL at 09:29

## 2024-01-12 RX ADMIN — METOPROLOL TARTRATE 25 MG: 25 TABLET, FILM COATED ORAL at 00:03

## 2024-01-12 RX ADMIN — SODIUM CHLORIDE 75 ML/HR: 0.9 INJECTION, SOLUTION INTRAVENOUS at 01:21

## 2024-01-12 RX ADMIN — IPRATROPIUM BROMIDE 0.5 MG: 0.5 SOLUTION RESPIRATORY (INHALATION) at 08:45

## 2024-01-12 RX ADMIN — LEVALBUTEROL HYDROCHLORIDE 1.25 MG: 1.25 SOLUTION RESPIRATORY (INHALATION) at 13:08

## 2024-01-12 RX ADMIN — BUSPIRONE HYDROCHLORIDE 30 MG: 10 TABLET ORAL at 09:29

## 2024-01-12 NOTE — ASSESSMENT & PLAN NOTE
Followed outpatient by Palliative Care, last seen on 1/10/24 with recommendations:   advised to continue pressure support ventilation at home PS 4/8 40% with cuff up overnight. During day on 10L w/ FiO2 40% with cuff down.  Goal setting 4/8 at FiO2 30%.  Keep O2 to maintainence SpO2 >85%  Aggressive pulmonary toilet   Suctioning via trach if patient is desatting and unable to expectorate phlegm.  Continue Atrovent and Xopenex for airway maintenance.    Continue inhalation treatment with Pulmicort and formoterol, duonebs , atrovent and xopenex    Plan:  Continue vent support with Palliative recommendations  Consult ENT for trach replacement: needs 7.0 cuffed Maisha  ENT recommendation for respiratory therapy to exchange Maisha.  OVN trach unable to be replaced. Supply issue, 7.0 Maisha not available.

## 2024-01-12 NOTE — ASSESSMENT & PLAN NOTE
Follows Palliative Care outpatient  - Home meds: Oxycodone 5 mg BID PRN    Plan:  Oxy 5 mg BID PRN

## 2024-01-12 NOTE — ED ATTENDING ATTESTATION
1/11/2024  I, Dwaine Guerrero MD, saw and evaluated the patient. I have discussed the patient with the resident/non-physician practitioner and agree with the resident's/non-physician practitioner's findings, Plan of Care, and MDM as documented in the resident's/non-physician practitioner's note, except where noted. All available labs and Radiology studies were reviewed.  I was present for key portions of any procedure(s) performed by the resident/non-physician practitioner and I was immediately available to provide assistance.       At this point I agree with the current assessment done in the Emergency Department.  I have conducted an independent evaluation of this patient a history and physical is as follows:  Patient is here for multiple issues she is vent dependent she has a tracheostomy she also has a PEG tube the PEG tube was dislodged last evening she is in need of a tracheostomy exchange  She is in need of blood work to rule out anemia  I spoke with palliative care physician Chai Sutherland   who follows this patient  He has called ENT and GI    Patient has no fever and has no complaints at the present time    Feeding tube will be replaced the tracheostomy will exchange and blood will be drawn  ED Course         Critical Care Time  Procedures

## 2024-01-12 NOTE — RESPIRATORY THERAPY NOTE
RT Ventilator Management Note  Lilia Ross 68 y.o. female MRN: [id number]  Unit/Bed#: ED 27 Encounter: [id number]      Daily Screen    No data found in the last 10 encounters.           Physical Exam:          Resp Comments: (P) pt size 7 xlt proximal  surgical airway removed by ENT attending. new size 7xlt proximal trach placed by ENT attending.  obturator removed and inner cannula placed in trach. pt placed back on ventilator. cuff inflated at this time. trach ties placed to secure trach.

## 2024-01-12 NOTE — CONSULTS
OTOLARYNGOLOGY CONSULT    Date of Service: 1/12/2024    Reason for consult: tracheostomy complication     ASSESSMENT/PLAN:  Lilia Ross is a 68 y.o. female who we are consulted on for tracheostomy change.  Generally, ENT only facilitate with first tracheostomy change.  However, was contacted by respiratory therapy that there was some difficulty removing the trach. Tracheostomy change performed at bedside by Dr. Wade and respiratory therapy.  Patient has a well-formed tract.  ENT will see outpatient in 1 month    Reccomendations:  -Trach care with RT  -Patient must follow up with ENT Dr. Guerra in 1 month    HPI  Patient is a 68-year-old female who underwent a tracheostomy by Dr. Guerra in September with a tracheostomy change in October who is persistently ventilator dependent who presented to the emergency room with PEG tube complications.  She was overdue for trach change.  Respiratory therapy attempted to perform trach change but met resistance when removing the trach.  ENT was consulted to assist in case emergency airway was needed.      CURRENT HOSPITAL MEDICATIONS  Current Facility-Administered Medications   Medication Dose Route Frequency Provider Last Rate Last Admin    acetaminophen (TYLENOL) tablet 650 mg  650 mg Oral Q8H PRN Malissa Soni MD        apixaban (ELIQUIS) tablet 5 mg  5 mg Oral BID Malissa Soni MD   5 mg at 01/12/24 0929    azithromycin (ZITHROMAX) 500 mg in sodium chloride 0.9% 250mL IVPB 500 mg  500 mg Intravenous Q24H Malissa Soni MD        busPIRone (BUSPAR) tablet 30 mg  30 mg Oral TID Malissa Soni MD   30 mg at 01/12/24 0929    calcium carbonate-vitamin D 500 mg-5 mcg tablet 1 tablet  1 tablet Oral BID With Meals Malissa Soni MD   1 tablet at 01/12/24 0929    ceftriaxone (ROCEPHIN) 1 g/50 mL in dextrose IVPB  1,000 mg Intravenous Q24H Malissa Soni MD        famotidine (PEPCID) tablet 20 mg  20 mg Oral Daily Malissa Soni MD   20 mg at  01/12/24 0929    fentanyl citrate (PF) 100 MCG/2ML   Intravenous PRN Bishnu Crews MD   25 mcg at 01/11/24 1753    fluticasone (ARNUITY ELLIPTA) 100 MCG/ACT inhaler 1 puff  1 puff Inhalation Daily Mlaissa Soni MD        folic acid (FOLVITE) tablet 1 mg  1 mg Per G Tube Daily Malissa Soni MD   1 mg at 01/12/24 0929    formoterol (PERFOROMIST) nebulizer solution 20 mcg  20 mcg Nebulization Q12H Malissa Soni MD   20 mcg at 01/12/24 0853    gabapentin (NEURONTIN) capsule 300 mg  300 mg Per G Tube BID Malissa Soni MD   300 mg at 01/12/24 0929    ipratropium (ATROVENT) 0.02 % inhalation solution 0.5 mg  0.5 mg Nebulization Q6H Yaquelin Rincon MD   0.5 mg at 01/12/24 0845    levalbuterol (XOPENEX) inhalation solution 1.25 mg  1.25 mg Nebulization Q6H Yaquelin Rincon MD   1.25 mg at 01/12/24 0845    lidocaine 1% buffered   Infiltration PRN Bishnu Crews MD   20 mL at 01/11/24 1755    melatonin tablet 3 mg  3 mg Per G Tube HS Malissa Soni MD   3 mg at 01/12/24 0004    metoprolol tartrate (LOPRESSOR) tablet 25 mg  25 mg Per G Tube Q12H [facility] Malissa Soni MD   25 mg at 01/12/24 0929    midazolam (VERSED) injection    PRN Bishnu Crews MD   0.5 mg at 01/11/24 1753    nicotine (NICODERM CQ) 7 mg/24hr TD 24 hr patch 1 patch  1 patch Transdermal Q24H Malissa Soni MD   1 patch at 01/12/24 0003    ondansetron (ZOFRAN-ODT) dispersible tablet 4 mg  4 mg Oral Q8H [facility] Malissa Soni MD        oxygen gas  12 L/min Inhalation Continuous Malissa Soni MD        QUEtiapine (SEROquel) tablet 50 mg  50 mg Per G Tube BID Malissa Soni MD   50 mg at 01/12/24 0929    sertraline (ZOLOFT) tablet 100 mg  100 mg Per PEG Tube Daily Malissa Soni MD   100 mg at 01/12/24 0929    sodium chloride 0.9 % infusion  75 mL/hr Intravenous Continuous Malissa Soni MD 75 mL/hr at 01/12/24 0121 75 mL/hr at 01/12/24 0121     Current Outpatient Medications    Medication Sig Dispense Refill    acetaminophen (TYLENOL) 650 mg CR tablet Take 1 tablet (650 mg total) by mouth every 8 (eight) hours as needed for moderate pain 30 tablet 0    apixaban (ELIQUIS) 5 mg Take 1 tablet (5 mg total) by mouth 2 (two) times a day 60 tablet 0    budesonide (PULMICORT) 90 MCG/ACT inhaler Inhale 1 puff 2 (two) times a day Rinse mouth after use. 1 each 2    busPIRone (BUSPAR) 30 MG tablet TAKE 1 TABLET BY MOUTH 3 TIMES A DAY. 270 tablet 0    Calcium Carb-Cholecalciferol (OSCAL-D) 500 mg-200 units per tablet Take 1 tablet by mouth 2 (two) times a day with meals      famotidine (PEPCID) 20 mg tablet Take 1 tablet (20 mg total) by mouth 2 (two) times a day 180 tablet 1    folic acid (FOLVITE) 1 mg tablet 1 tablet (1 mg total) by Per G Tube route daily 90 tablet 3    formoterol (PERFOROMIST) 20 MCG/2ML nebulizer solution Take 2 mL (20 mcg total) by nebulization every 12 (twelve) hours 360 mL 1    gabapentin (NEURONTIN) 300 mg capsule 1 capsule (300 mg total) by Per G Tube route 3 (three) times a day 270 capsule 3    ipratropium (ATROVENT) 0.02 % nebulizer solution Take 2.5 mL (0.5 mg total) by nebulization every 6 (six) hours 300 mL 0    levalbuterol (XOPENEX) 1.25 mg/3 mL nebulizer solution Take 3 mL (1.25 mg total) by nebulization every 6 (six) hours 360 mL 0    melatonin 3 mg 1 tablet (3 mg total) by Per G Tube route daily at bedtime 90 tablet 3    metoprolol tartrate (LOPRESSOR) 25 mg tablet 1 tablet (25 mg total) by Per G Tube route every 12 (twelve) hours 180 tablet 3    naloxone (NARCAN) 4 mg/0.1 mL nasal spray Administer 1 spray into a nostril. If no response after 2-3 minutes, give another dose in the other nostril using a new spray. 1 each 1    nicotine (NICODERM CQ) 7 mg/24hr TD 24 hr patch Place 1 patch on the skin over 24 hours every 24 hours 28 patch 0    ondansetron (ZOFRAN-ODT) 4 mg disintegrating tablet Take 1 tablet (4 mg total) by mouth every 8 (eight) hours 60 tablet 0     oxyCODONE (Roxicodone) 5 immediate release tablet 1 tablet (5 mg total) by Per G Tube route 2 (two) times a day as needed for moderate pain or severe pain Max Daily Amount: 10 mg 90 tablet 0    oxygen gas Inhale 12 L/min continuous. pt on ventilator - o2 mask   Indications: Tracheostomy      QUEtiapine (SEROquel) 50 mg tablet 1 tablet (50 mg total) by Per G Tube route 2 (two) times a day 180 tablet 3    sertraline (ZOLOFT) 100 mg tablet 1 tablet (100 mg total) by Per PEG Tube route daily 90 tablet 3    sodium chloride, PF, 0.9 % Inject 5 mL into a catheter in a vein 2 (two) times a day as needed for line care 60 mL 0       REVIEW OF SYSTEMS  As above      HISTORIES  PMH:  Past Medical History:   Diagnosis Date    Anxiety     Depression     Fatty liver     Hyperlipidemia     Hypertension     Psychiatric disorder     Varicella        PSH:  Past Surgical History:   Procedure Laterality Date    BREAST SURGERY Bilateral     Reduction Procedure    CARDIAC SURGERY       SECTION      x4    DILATION AND CURETTAGE OF UTERUS      ECTOPIC PREGNANCY SURGERY      IR GASTROSTOMY TUBE PLACEMENT  2023    IR TUNNELED CENTRAL LINE PLACEMENT  10/19/2023    MS BRNCHSC INCL FLUOR GDNCE DX W/CELL WASHG SPX N/A 2023    Procedure: BRONCHOSCOPY FLEXIBLE;  Surgeon: Joshua Guerra MD;  Location: [facility];  Service: ENT    TRACHEOSTOMY N/A 2023    Procedure: TRACHEOSTOMY, biopsy of nasopharynx mass;  Surgeon: Joshua Guerra MD;  Location: [facility];  Service: ENT       SocHx:  Social History     Tobacco Use    Smoking status: Every Day     Current packs/day: 1.00     Types: Cigarettes    Smokeless tobacco: Never    Tobacco comments:     2 Black and milds per day   Vaping Use    Vaping status: Never Used   Substance Use Topics    Alcohol use: Not Currently    Drug use: Yes     Types: Marijuana     Comment: for pain       FH:  Family History   Problem Relation Age of Onset    Lung cancer Mother     Cirrhosis Father      Hypertension Sister     Bipolar disorder Sister     Heart disease Sister     Diabetes Family     Heart disease Family     Hypertension Family     Stroke Family     Thyroid disease Family        ALLERGIES:  Allergies   Allergen Reactions    Methyldopa Shortness Of Breath and Other (See Comments)     Aldomet       PHYSICAL EXAM  Visit Vitals  BP 93/54   Pulse 82   Temp (!) 97.4 °F (36.3 °C) (Oral)   Resp 12   SpO2 97%   OB Status Postmenopausal   Smoking Status Every Day       Physical Exam  Pulmonary:      Breath sounds: No stridor.      Comments: Tracheostomy in place  Neurological:      Mental Status: She is alert.           LABORATORY  Reviewed    PROCEDURES  Procedure: Tracheotomy change    Indications: Ongoing tracheotomy management    Findings: well healed tracheostomy tract     Procedure in detail: After informed verbal consent was obtained the patient, patient was laid in the supine position with neck extended. While holding tracheotomy in place, tracheotomy was unsecured from the neck, cutting all sutures and trach ties.  Trach was removed. 7-0 Shiley Proximal XLT Trach was placed through the tracheotomy tract. Trach was resecured using soft trach ties. Patient tolerated procedure well and returned the care of nursing in stable condition.      RADIOLOGY  Patient Active Problem List    Diagnosis Date Noted    Tracheostomy malfunction (HCC) 01/12/2024    Aspiration pneumonia (HCC) 01/12/2024    Hx of pulmonary embolus 01/12/2024    Anemia, unspecified 01/11/2024    Tracheostomy in place ([facility]) 01/10/2024    Ventilator dependence ([facility]) 01/10/2024    PICC (peripherally inserted central catheter) in place 12/12/2023    Acute embolism and thrombosis of right internal jugular vein ([facility]) 11/16/2023    Protein-calorie malnutrition ([facility]) 11/14/2023    Pancytopenia due to chemotherapy ([facility]) 10/23/2023    Depression 10/02/2023    Gastrostomy tube in place ([facility]) 09/27/2023    Chronic Superficial thrombophlebitis  09/24/2023    Large cell lymphoma (HCC) 09/21/2023    Chemotherapy induced neutropenia  09/21/2023    Blister (nonthermal) of left forearm, sequela 09/17/2023    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    Ambulatory dysfunction 08/30/2023    Acute respiratory failure with hypoxia secondary to oropharyngeal mass 08/25/2023    (HFpEF) heart failure with preserved ejection fraction (HCC) 08/25/2023    Pulmonary embolism (HCC) 08/25/2023    CKD (chronic kidney disease) stage 3, GFR 30-59 ml/min ([facility]) 08/20/2021    Bone lesion 06/18/2021    Nicotine dependence 06/15/2021    Angio-edema, initial encounter 07/10/2020    Pain syndrome, chronic 06/04/2014    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

## 2024-01-12 NOTE — DISCHARGE SUMMARY
DISCHARGE SUMMARY - Family Medicine Residency, [facility] 1955, 68 y.o. female.  MRN: [id number]    Unit/Bed#: ED 27 Encounter: [id number]  Primary Care Provider: Antonia Ochoa MD      Admission Date: 1/11/24  Discharge Date: 01/12/24  Length of Stay: 0 days  Diagnosis:   Principal Problem:    Tracheostomy malfunction (HCC)  Active Problems:    Benign essential hypertension    Pain syndrome, chronic    Bipolar disorder, unspecified (HCC)    Nicotine dependence    CKD (chronic kidney disease) stage 3, GFR 30-59 ml/min (HCC)    (HFpEF) heart failure with preserved ejection fraction (HCC)    Large cell lymphoma (HCC)    Ventilator dependence (HCC)    Gastrostomy tube in place (HCC)    Anemia, unspecified    Aspiration pneumonia (HCC)    Hx of pulmonary embolus      ASSESSMENTS & PLANS:   Plans discussed with [facility] team and finalization is pending attending physician attestation.    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    Myofascial pain syndrome    Pain syndrome, chronic    Angio-edema, initial encounter    Bipolar disorder, unspecified (HCC)    Nicotine dependence    Bone lesion    CKD (chronic kidney disease) stage 3, GFR 30-59 ml/min (HCC)    Acute respiratory failure with hypoxia secondary to oropharyngeal mass    (HFpEF) heart failure with preserved ejection fraction (HCC)    Pulmonary embolism (HCC)    Ambulatory dysfunction    Oropharyngeal mass    Blister (nonthermal) of left forearm, sequela    Large cell lymphoma (HCC)    Chemotherapy induced neutropenia     Chronic Superficial thrombophlebitis    Depression    Pancytopenia due to chemotherapy (HCC)    Protein-calorie malnutrition (HCC)    Acute embolism and thrombosis of right internal jugular vein (HCC)    PICC (peripherally inserted central catheter) in place    Tracheostomy in place (HCC)    Ventilator dependence  "(HCC)    Gastrostomy tube in place (HCC)    Anemia, unspecified    Tracheostomy malfunction (HCC)    Aspiration pneumonia (HCC)    Hx of pulmonary embolus         HPI (per admission H&P note on 1/11/24)     HPI:   \"Lilia Ross is a 68 y.o. 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  Patient had an appointment with palliative care on 1/10/2024 during which it was discovered that patient's G-tube had been dislodged and was also malfunctioning due to being clogged.  Patient was also found to have brown-tinged purulent secretions around her tracheal tube.  Patient was recommended to go to [facility] ED for trach tube replacement and G-tube replacement.   At [facility] ED, IR was consulted and G-tube was successfully replaced.  ENT/RT was consulted to replace patient's trach tube.  However with multiple attempts, replacement was unsuccessful due to not having the right equipment for her needs.  Patient specifically needed a 7.0 cuffed Shiley.   BMP showed increased creatinine to 2.5 with GFR at 18.  Previous creatinine at 2.4. Creatinine likely worsened due to decreased intake likely from G-tube malfunction function.  Chest x-ray showed concern for acute on chronic aspiration pneumonia.  Likely due to increased oral intake dependence due to G-tube malfunction.  Patient given 1 dose of ceftriaxone in the ED.     Patient was frustrated throughout the whole ordeal and wanted to go home.  However she was convinced to stay to at least get the tracheal replacement needed.   Of note, patient had a 60-day stay at [facility] in September 2023 and left AMA due to frustration and lengthy stay.  Is only interested in the trach exchange and would like to leave ASAP.\"      HOSPITAL COURSE:     Hospital " Course:   68 y.o. female admitted on 1/11/2024 for trach exchange after concern of purulent discharge coming form the trach. She also had dislodged her PEG tube which was replaced by IR in the ED. Multiple attempts were made to replace her trach and was eventually replaced by ENT. Patient did have hx of PO prior to admission for which she was started on fluids with improvement of Cr from 2.54 to 2.21. Labs otherwise remained stable from previous. Following PEG replacement and trach exchanged, pt expressed desire to go home. Family was contacted. She was prescribed augmentin BID x 5 days for her aspiration PNA and ordered repeat barium swallow outpatient at recommendation of SLP who was concerned for silent aspiration. They otherwise recommend soft/level 3 diet and thin liquids. CM was contacted for assistance coordination discharge via vent-capable ambulance. Pt had  time of 1600 and was sent home. Pt's daughter in law, sidney, and daughter franca were notified by FM team and CM. Encourage follow up outpatient with PCP and specialists especially ENT in 1 month (contact info was provided on AVS). All questions addessed.    COMPLICATIONS:     Complications: NONE       PROCEDURES:     Procedures Performed:   Orders Placed This Encounter   Procedures    Tracheostomy Replacement         SIGNIFICANT FINDINGS / ABNORMAL RESULTS:     Significant Findings/Abnormal Results with this admission:      XR chest 1 view portable    Result Date: 1/11/2024  Narrative: CHEST INDICATION:   trach productive sputum. COMPARISON: Radiograph from November 24, 2023, PET/CT from December 22, 2023 EXAM PERFORMED/VIEWS:  XR CHEST PORTABLE FINDINGS: Tracheostomy tube in place. Tunneled right internal jugular catheter with tip overlying the lower SVC. Cardiomediastinal silhouette is moderately enlarged, unchanged. Enlarged main pulmonary artery, also unchanged. Diffuse mild prominence of the interstitial lung markings suggestive of mild  interstitial pulmonary edema. Again seen are patchy opacities in both lung bases, with associated linear scarring. This may represent acute on chronic aspiration-pneumonia related changes. Mild blunting of the left costophrenic angle may represent a small left pleural effusion. No pneumothorax. Osseous structures appear within normal limits for patient age.     Impression: 1. Possible acute on chronic aspiration-pneumonia related changes in the lung bases. 2. Cardiomegaly and mild interstitial pulmonary edema. Possible small left pleural effusion. Workstation performed: [id number]     NM PET CT skull base to mid thigh    Result Date: 12/22/2023  Narrative: PET/CT SCAN INDICATION: C83.31: Diffuse large b-cell lymphoma, lymph nodes of head, face, and neck Nasopharyngeal lymphoma, restaging examination following chemotherapy. MODIFIER: PS COMPARISON: No prior PET scans for comparison. Chest CT 11/22/2023, neck CT 11/15/2023, CT abdomen 10/12/2023 CELL TYPE: Diffuse large B-cell lymphoma, germinal center B-cell type, of the nasopharynx TECHNIQUE:   10.9 mCi F-18-FDG administered IV. Multiplanar attenuation corrected and non attenuation corrected PET images are available for interpretation, and contiguous, low dose, axial CT sections were obtained from the skull base through the femurs.  Intravenous contrast material was not utilized. This examination, like all CT scans performed in the [facility], was performed utilizing techniques to minimize radiation dose exposure, including the use of iterative reconstruction and automated exposure control. *Tracheotomy tube and PEG tube. Patient had difficulty lying horizontally, exam had to be performed with the patient partially elevated Fasting serum glucose: 84 mg/dl FINDINGS: VISUALIZED BRAIN: Motion artifact is present. There is no obvious acute abnormality HEAD/NECK: -In comparison to the previous neck CT, degree of asymmetric soft tissue density in the  left nasopharynx has partially improved. SUV max on the left, image 65, is 2.4, corresponding area on the right demonstrates SUV max of 2.2. - No suspicious FDG avid cervical adenopathy or other new area of FDG avid soft tissue disease CT images: Tracheotomy tube noted. Activity along the tract is anticipated. CHEST: -No FDG avid intrathoracic or axillary adenopathy - Rather diffuse increased activity is seen within the dependent portions of the lower lobes particularly on the left, SUV max 3.7. This corresponds to bibasilar posterior/dependent airspace opacities which, in comparison to the most recent CT study appear relatively similar. This is likely inflammatory/infectious/atelectatic. CT images: There is a trace right-sided effusion. No pericardial effusion. Fuavat-r-Nraz catheter is present with tip in region of cavoatrial junction. ABDOMEN: No FDG avid soft tissue lesions are seen. CT images: Right renal cysts are noted. The upper pole complex cyst as seen on earlier abdominal CT demonstrates no abnormal FDG activity. Several subcentimeter hypodense nodules in the left kidney possibly traject cysts. A PEG tube is present. PELVIS: No FDG avid soft tissue lesions are seen. CT images: Small ventral adipose containing hernia. OSSEOUS STRUCTURES: No FDG avid lesions are seen. CT images: Known T12 vertebral body loss of height with lucent defects similar to previous CT study. There is no associated abnormal FDG activity     Impression: 1. Compared to most recent neck CT, diminished degree of asymmetric soft tissue opacity in the nasopharynx. Minimal FDG activity, SUV max on the left is 2.4 compared to 2.2 on the right. The Deauville score is 2 Liver SUV max is 2.8 Mediastinal blood pool SUV max is 2.5 2. Bibasilar lower lobe airspace disease similar to the most recent prior CT Workstation performed: [id number]         VITALS ON DISCHARGE DATE:     Vitals  Blood Pressure: (!) 169/109 (01/12/24 1500)  Temperature: (!)  97.4 °F (36.3 °C) (01/11/24 1222)  Temp Source: Oral (01/11/24 1222)  Pulse: 90 (01/12/24 1500)  Respirations: 12 (01/12/24 1500)  SpO2: 98 % (01/12/24 1500)  Weight - Scale: 79.8 kg (175 lb 14.8 oz) (01/12/24 1542)    HR:  [72-90] 90  Resp:  [12-20] 12  BP: ()/() 169/109  FiO2 (%):  [24-25] 24    Weight (last 2 days) before discharge       Date/Time Weight    01/12/24 1542 79.8 (175.93)              Intake/Output Summary (Last 24 hours) at 1/12/2024 1803  Last data filed at 1/12/2024 1600  Gross per 24 hour   Intake 1098.75 ml   Output --   Net 1098.75 ml       Invasive Devices       Central Venous Catheter Line  Duration             CVC Central Lines 10/19/23 Double Right Internal jugular 85 days              Drain  Duration             Gastrostomy/Enterostomy Percutaneous Endoscopic Gastrostomy (PEG) 16 Fr. LUQ <1 day              Airway  Duration             Surgical Airway Shiley Cuffed 117 days                      PHYSICAL EXAM ON DAY OF DISCHARGE:     Physical Exam  Vitals reviewed.   HENT:      Head: Normocephalic and atraumatic.      Right Ear: External ear normal.      Left Ear: External ear normal.      Nose: Nose normal.      Mouth/Throat:      Pharynx: Oropharynx is clear.   Eyes:      Extraocular Movements: Extraocular movements intact.      Conjunctiva/sclera: Conjunctivae normal.   Cardiovascular:      Rate and Rhythm: Normal rate.   Pulmonary:      Effort: Pulmonary effort is normal.      Comments: trach  Musculoskeletal:      Cervical back: Neck supple.   Skin:     General: Skin is warm.      Capillary Refill: Capillary refill takes less than 2 seconds.   Neurological:      Mental Status: She is alert.   Psychiatric:         Mood and Affect: Mood normal.         Behavior: Behavior normal.          CONDITION AT DISCHARGE:   On day of discharge patient is seen and evaluated at bedside. Patient is stable and without concern. Patient denies any pain or SOB. Patient and family is aware of  current health status and understand plan of treatment and outpatient follow-up. The patient and family understood and agreed with the plan. All pertinent lab results, imaging studies, procedures, and/or any incidental findings have been disclosed to the patient. All pertinent questions are answered to patient's satisfaction. On day of discharge, the patient was hemodynamically stable and appropriate for discharge home.    Condition at Discharge: stable       DISCHARGE MEDICATIONS:     Discharge Medications:  See after visit summary (AVS) for detailed reconciled discharge medications, which was provided to patient and family. Summary of medication changes made with this admission:    RESUME:  All other home medications       FOLLOW-UP APPOINTMENTS / INSTRUCTION :     Important Physician Related Follow Up:   PCP  ENT in 1 month    Appointment confirmed:  Future Appointments   Date Time Provider Department Center   1/17/2024 To Be Determined Judy Infante RN [facility]   1/24/2024 To Be Determined Judy Infante RN [facility]   1/31/2024 To Be Determined Judy Infante RN [facility]   2/8/2024  1:00 PM [facility]   3/11/2024 10:20 AM Raz Crespo MD [facility]       Discharge instructions/Information to patient and family:   See after visit summary (AVS) for information provided to patient and family.      Provisions for Follow-Up Care:  See after visit summary for information related to follow-up care and any pertinent home health orders.        DISPOSITION:     Disposition: Home    Discharge Statement   I spent 30  minutes discharging the patient. This time was spent on the day of discharge. I had direct contact with the patient on the day of discharge. Additional documentation is required if more than 30 minutes were spent on discharge.     Planned Readmission: No        Deepti Garland, DO  PGY-3, Family  "Medicine  01/12/24  6:03 PM    Dear reader, please be aware that portions of my note contain dictated text. I have done my best to proof-read this note prior to signing. However, there may be occasional unnoticed errors pertaining to \"sound-alike\" words and/or grammar during my dictation process. If there is any words or information that is unclear or appears erroneous, please kindly let me know and I will clarify and/or addend my notes accordingly. Thank you for your understanding.  "

## 2024-01-12 NOTE — PROGRESS NOTES
Received request for patient to get a PA for Procrit [id number] units. Auth has been submitted via cover my meds and is pending and marked urgent.  PA REQUIRED… [phone]    BIN:       [id number]  PCN:      [id number]  ID:          [id number]  GRP:      [id number]

## 2024-01-12 NOTE — CASE MANAGEMENT
Case Management ED Discharge Planning Note    Patient name Lilia Ross  Location ED 27/ED 27 MRN [id number]  : 1955 Date 2024        OBJECTIVE:  Predictive Model Details          92%  Factor Value    Risk of Hospital Admission or ED Visit Model 29% Number of ED Visits 2     15% Has Medicaid Yes     13% Number of Hospitalizations 2     9% Is in Relationship No     9% Has Chronic Kidney Disease Yes     6% Has COPD Yes     6% Has Anemia Yes     5% Has CVD Yes     5% Has Depression Yes     3% Has CHF Yes     1% Has PCP Yes            Chief Complaint: Tracheostomy malfunction (HCC) .  Patient Class: Observation  Preferred Pharmacy:   [id number] [place] - [address]  [address]  [place] [address]  Phone: [phone] Fax: [phone]    [facility] - [place] - [address]  [address]  [place] [address]  Phone: [phone] Fax: [phone]    Primary Care Provider: Antonia Ochoa MD    Primary Insurance: [employer]  Secondary Insurance: [facility]    ED Discharge Details:     CM contacted by provider that pt is discharging home and is vent dependent and will need transport home.     CM spoke with pt's daughter via phone call and confirmed she will be home for pt's arrival. CM to request 4pm transport. CM reserved [facility] transport for pt due to vent care needs.

## 2024-01-12 NOTE — H&P
[facility]  History and Physical - Family Medicine Residency  Lilia Ross 1955, 68 y.o. female.  MRN: [id number]  Unit/Bed#: ED 27 Encounter: [id number]  Primary Care Provider: Antonia Ochoa MD  Admission Date: 1/11/2024 1210  Code Status:  Level 1 - Full Code      Assessment/Plan:      Plans discussed with [facility] team and finalization is pending attending physician attestation. Please, call [phone] for any clarification.     * Tracheostomy malfunction (HCC)  Assessment & Plan  - 1/10/24 during outpatient Palliative appointment, patient's tracheostomy noted to brown tinged purulent secretions around trach  - Recommended for ED for trach tube replacement  - Attempted replacement, however RT did not have the right equipment: Needs 7.0 cuffed Maisha  - Recommended for admission till replaced    Plan:  ENT/RT consulted   Continue trach care PRN, vent support          Aspiration pneumonia (HCC)  Assessment & Plan  - At baseline, patient able to eat/drink orally, trach/vent dependent, uses PEG tube for some medications. Gtube recently dislodged; likely increase in oral intake dependence  - Sent to ED for trach leakage concerns, recommended for trach replacement  - CXR 1/11/24: Acute on chronic aspiration PNA  - Afebrile, on baseline vent settings  - Procal in ED:   - PE: Moderate cough  - Ceftriaxone x 1 in ED    Plan:  IV ceftriaxone and azithromycin  NPO  Aspiration precautions  Speech evaluation    Gastrostomy tube in place (HCC)  Assessment & Plan  - G tube inadvertently removed by patient and family was unable to replace, G tube found to be clogged/malfunctioning  - Sent to ED by Palliative Care to have G tube replaced; replaced on 1/10/24 by IR    Plan:  Consult Nutrition Services for tube feed recommendations  May resume medications via G tube    Ventilator dependence (HCC)  Assessment & Plan  - Followed outpatient by Palliative Care, last seen on 1/10/24 with  recommendations:   advised to continue pressure support ventilation at home PS 4/8 40% with cuff up overnight. During day on 10L w/ FiO2 40% with cuff down.  Goal setting 4/8 at FiO2 30%.  Keep O2 to maintainence SpO2 >85%  Aggressive pulmonary toilet   Suctioning via trach if patient is desatting and unable to expectorate phlegm.  Continue Atrovent and Xopenex for airway maintenance.    Continue inhalation treatment with Pulmicort and formoterol, duonebs , atrovent and xopenex    Plan:  Continue vent support with Palliative recommendations  Consult ENT for trach replacement: needs 7.0 cuffed Maisha    CKD (chronic kidney disease) stage 3, GFR 30-59 ml/min (Formerly [facility])  Assessment & Plan  Lab Results   Component Value Date    EGFR 18 01/11/2024    EGFR 19 01/03/2024    EGFR 33 12/27/2023    CREATININE 2.54 (H) 01/11/2024    CREATININE 2.48 (H) 01/03/2024    CREATININE 1.56 (H) 12/27/2023     - Likely dehydration due to recent G tube malfunction    Plan:  IVF at 75 cc/hr  Avoid nephrotoxic agents  Consider Nephro consult if Cr/GFR continue to worsen    Hx of pulmonary embolus  Assessment & Plan  - Hx of PE in Aug 2023, hypercoagulable state likely due to B- lymphoma  - Home meds: Apixaban 5 mg BID    Plan:  Continue home med    Anemia, unspecified  Assessment & Plan  - Chronic anemia  - Last Hgb at 9.5, improved from previous  - Sent to ED for transfusion, however patient does not meet criteria  - Transfusion not given in ED    Plan:  Monitor hgb  Transfuse if hgb < 7     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 s/p 4 cycles of chemotherapy  - Required trachiostomy w vent support   - Follows with Heme/Onc, Palliative outpatient      (HFpEF) heart failure with preserved ejection fraction (HCC)  Assessment & Plan  Wt Readings from Last 3 Encounters:   12/04/23 70.8 kg (156 lb 1.4 oz)   09/07/23 74.6 kg (164 lb 6.4 oz)   08/30/23 73.3 kg (161 lb 9.6 oz)     -  Last Echo 2023: EF at 65%  - Home med: metoprolol tartrate 25 mg BID    Plan:  Avoid fluid overload    Nicotine dependence  Assessment & Plan  - Home meds: Nicoderm 7 mg/24 1 patch    Plan:  Continue home meds    Bipolar disorder, unspecified (HCC)  Assessment & Plan  - Home meds: Buspar 30 mg TID, Quetiapine 50 mg BID, Sertraline 100 mg daily    Plan:  Continue home meds    Pain syndrome, chronic  Assessment & Plan  - Follows Palliative Care outpatient  - Home meds: Oxycodone 5 mg BID PRN    Plan:  Oxy 5 mg BID PRN    Benign essential hypertension  Assessment & Plan  - Home meds: metoprolol tartrate 25 mg BID    Systolic (24hrs), Av , Min:134 , Max:204   Diastolic (24hrs), Av, Min:79, Max:120  - Most recent BP Blood Pressure: 161/99     Plan:  Continue home meds          POLST: Yes  Diet: Diet NPO  VTE Pharm PPX: Apixaban 5 mg BID  VTE Mech PPX: sequential compression device  Disposition: This patient requires inpatient level care.     History of Present Illness      HPI:  Lilia Ross is a 68 y.o. 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  Patient had an appointment with palliative care on 1/10/2024 during which it was discovered that patient's G-tube had been dislodged and was also malfunctioning due to being clogged.  Patient was also found to have brown-tinged purulent secretions around her tracheal tube.  Patient was recommended to go to [facility] ED for trach tube replacement and G-tube replacement.   At [facility] ED, IR was consulted and G-tube was successfully replaced.  ENT/RT was consulted to replace patient's trach tube.  However with multiple attempts, replacement was unsuccessful due to not having the right equipment for her needs.  Patient  specifically needed a 7.0 cuffed Shiley.   BMP showed increased creatinine to 2.5 with GFR at 18.  Previous creatinine at 2.4. Creatinine likely worsened due to decreased intake likely from G-tube malfunction function.  Chest x-ray showed concern for acute on chronic aspiration pneumonia.  Likely due to increased oral intake dependence due to G-tube malfunction.  Patient given 1 dose of ceftriaxone in the ED.    Patient was frustrated throughout the whole ordeal and wanted to go home.  However she was convinced to stay to at least get the tracheal replacement needed.   Of note, patient had a 60-day stay at [facility] in 2023 and left [facility] due to frustration and lengthy stay.  Is only interested in the trach exchange and would like to leave ASAP.       Review of Systems   Constitutional:  Negative for chills and fever.   HENT:  Negative for ear pain and sore throat.         Purulent secretions around trach collar   Eyes:  Negative for pain and visual disturbance.   Respiratory:  Positive for cough. Negative for shortness of breath.    Cardiovascular:  Negative for chest pain and palpitations.   Gastrointestinal:  Negative for abdominal pain and vomiting.        G tube dislodged - replaced in ED   Genitourinary:  Negative for dysuria and hematuria.   Musculoskeletal:  Negative for arthralgias and back pain.   Skin:  Negative for color change and rash.   Neurological:  Negative for seizures and syncope.   All other systems reviewed and are negative.         Historical Information   Past Medical History:   Diagnosis Date    Anxiety     Depression     Fatty liver     Hyperlipidemia     Hypertension     Psychiatric disorder     Varicella      Past Surgical History:   Procedure Laterality Date    BREAST SURGERY Bilateral     Reduction Procedure    CARDIAC SURGERY       SECTION      x4    DILATION AND CURETTAGE OF UTERUS      ECTOPIC PREGNANCY SURGERY      IR GASTROSTOMY TUBE PLACEMENT  2023    IR TUNNELED  CENTRAL LINE PLACEMENT  10/19/2023    [facility]/CELL WASHG SPX N/A 9/17/2023    Procedure: BRONCHOSCOPY FLEXIBLE;  Surgeon: Joshua Guerra MD;  Location: [facility];  Service: ENT    TRACHEOSTOMY N/A 9/17/2023    Procedure: TRACHEOSTOMY, biopsy of nasopharynx mass;  Surgeon: Joshua Guerra MD;  Location[facility];  Service: ENT     Social History   Social History     Substance and Sexual Activity   Alcohol Use Not Currently     Social History     Substance and Sexual Activity   Drug Use Yes    Types: Marijuana    Comment: for pain     Social History     Tobacco Use   Smoking Status Every Day    Current packs/day: 1.00    Types: Cigarettes   Smokeless Tobacco Never   Tobacco Comments    2 Black and milds per day     Family History: non-contributory    Objective:     Vitals:    01/12/24 0003 01/12/24 0045 01/12/24 0100 01/12/24 0200   BP: 161/99  141/83 140/83   BP Location:   Left arm Left arm   Pulse: 81  76 72   Resp:   20 18   Temp:       TempSrc:       SpO2:  100% 100% 100%     Temp:  [97.4 °F (36.3 °C)] 97.4 °F (36.3 °C)  HR:  [72-84] 72  Resp:  [18-20] 18  BP: (134-204)/() 140/83  FiO2 (%):  [24-25] 24  Weight (last 2 days)       None            Intake/Output Summary (Last 24 hours) at 1/12/2024 0415  Last data filed at 1/11/2024 1624  Gross per 24 hour   Intake 500 ml   Output --   Net 500 ml     Invasive Devices       Central Venous Catheter Line  Duration             CVC Central Lines 10/19/23 Double Right Internal jugular 84 days              Drain  Duration             Gastrostomy/Enterostomy Percutaneous Endoscopic Gastrostomy (PEG) 16 Fr. LUQ <1 day              Airway  Duration             Surgical Airway Shiley Cuffed 116 days                      Physical Exam:     Physical Exam  Vitals and nursing note reviewed.   Constitutional:       General: She is not in acute distress.     Appearance: She is well-developed.      Interventions: Cervical collar in place.   HENT:       Head: Normocephalic and atraumatic.      Right Ear: External ear normal.      Left Ear: External ear normal.      Nose: Nose normal.      Mouth/Throat:      Mouth: Mucous membranes are moist.      Pharynx: Oropharynx is clear.      Comments: Trach w vent support  Eyes:      Conjunctiva/sclera: Conjunctivae normal.   Cardiovascular:      Rate and Rhythm: Normal rate and regular rhythm.      Pulses: Normal pulses.      Heart sounds: Normal heart sounds. No murmur heard.  Pulmonary:      Effort: Pulmonary effort is normal. No respiratory distress.      Breath sounds: Stridor present. Rhonchi present.   Abdominal:      General: Abdomen is flat. Bowel sounds are normal.      Palpations: Abdomen is soft.      Tenderness: There is abdominal tenderness in the epigastric area. There is no right CVA tenderness or left CVA tenderness.      Comments: G tube in place   Musculoskeletal:         General: No swelling.      Cervical back: Neck supple.   Skin:     General: Skin is warm and dry.      Capillary Refill: Capillary refill takes less than 2 seconds.   Neurological:      General: No focal deficit present.      Mental Status: She is alert and oriented to person, place, and time. Mental status is at baseline.   Psychiatric:         Mood and Affect: Mood normal.           Labs:     CBC:  Results from last 7 days   Lab Units 01/11/24  1331   WBC Thousand/uL 9.03   HEMOGLOBIN g/dL 9.5*   HEMATOCRIT % 32.2*   PLATELETS Thousands/uL 263   NEUTROS ABS Thousands/µL 6.87       CMP:  Results from last 7 days   Lab Units 01/11/24  1331   POTASSIUM mmol/L 5.0   CHLORIDE mmol/L 104   CO2 mmol/L 28   BUN mg/dL 28*   CREATININE mg/dL 2.54*   CALCIUM mg/dL 10.1   EGFR ml/min/1.73sq m 18       Sepsis:        Micro:  Lab Results   Component Value Date/Time    Blood Culture No Growth After 5 Days. 10/01/2023 03:40 AM    Blood Culture No Growth After 5 Days. 09/30/2023 08:41 PM    Sputum Culture 4+ Growth of 11/10/2023 09:40 AM    Gram Stain  Result 2+ Polys (A) 11/10/2023 09:40 AM    Gram Stain Result (A) 11/10/2023 09:40 AM     1+ Gram positive cocci in pairs, chains and clusters    Gram Stain Result 1+ Gram negative rods (A) 11/10/2023 09:40 AM    Gram Stain Result 1+ Epithelial Cells (A) 11/10/2023 09:40 AM    Wound Culture 1+ Growth of Candida albicans (A) 09/17/2023 09:42 AM    Legionella Urinary Antigen Negative 08/25/2023 09:08 PM         Imaging:     XR chest 1 view portable    Result Date: 1/11/2024  Impression: 1. Possible acute on chronic aspiration-pneumonia related changes in the lung bases. 2. Cardiomegaly and mild interstitial pulmonary edema. Possible small left pleural effusion. Workstation performed: [id number]         Medications:     Current Facility-Administered Medications   Medication Dose Route Frequency    acetaminophen (TYLENOL) tablet 650 mg  650 mg Oral Q8H PRN    apixaban (ELIQUIS) tablet 5 mg  5 mg Oral BID    azithromycin (ZITHROMAX) 500 mg in sodium chloride 0.9% 250mL IVPB 500 mg  500 mg Intravenous Q24H    busPIRone (BUSPAR) tablet 30 mg  30 mg Oral TID    calcium carbonate-vitamin D 500 mg-5 mcg tablet 1 tablet  1 tablet Oral BID With Meals    ceftriaxone (ROCEPHIN) 1 g/50 mL in dextrose IVPB  1,000 mg Intravenous Q24H    famotidine (PEPCID) tablet 20 mg  20 mg Oral Daily    fentanyl citrate (PF) 100 MCG/2ML   Intravenous PRN    fluticasone (ARNUITY ELLIPTA) 100 MCG/ACT inhaler 1 puff  1 puff Inhalation Daily    folic acid (FOLVITE) tablet 1 mg  1 mg Per G Tube Daily    formoterol (PERFOROMIST) nebulizer solution 20 mcg  20 mcg Nebulization Q12H    gabapentin (NEURONTIN) capsule 300 mg  300 mg Per G Tube BID    ipratropium (ATROVENT) 0.02 % inhalation solution 0.5 mg  0.5 mg Nebulization Q6H    levalbuterol (XOPENEX) inhalation solution 1.25 mg  1.25 mg Nebulization Q6H    lidocaine 1% buffered   Infiltration PRN    melatonin tablet 3 mg  3 mg Per G Tube HS    metoprolol tartrate (LOPRESSOR) tablet 25 mg  25 mg Per G  Tube Q12H ASHLEY    midazolam (VERSED) injection    PRN    nicotine (NICODERM CQ) 7 mg/24hr TD 24 hr patch 1 patch  1 patch Transdermal Q24H    ondansetron (ZOFRAN-ODT) dispersible tablet 4 mg  4 mg Oral Q8H ASHLEY    oxygen gas  12 L/min Inhalation Continuous    QUEtiapine (SEROquel) tablet 50 mg  50 mg Per G Tube BID    sertraline (ZOLOFT) tablet 100 mg  100 mg Per PEG Tube Daily    sodium chloride 0.9 % infusion  75 mL/hr Intravenous Continuous       Meds/Allergies   all medications and allergies reviewed  Allergies   Allergen Reactions    Methyldopa Shortness Of Breath and Other (See Comments)     Aldomet       Counseling / Coordination of Care  Total floor / unit time spent today 30 minutes.  Greater than 50% of total time was spent with the patient and / or family counseling and / or coordination of care.    Malissa Soni MD  Family Medicine, PGY-2  1/12/2024

## 2024-01-12 NOTE — RESPIRATORY THERAPY NOTE
RT Ventilator Management Note  Lilia Ross 68 y.o. female MRN: [id number]  Unit/Bed#: ED 27 Encounter: [id number]      Daily Screen    No data found in the last 10 encounters.           Physical Exam:          Resp Comments: (P) spoke with MD regarding request to change trach. storeroom  only stockes 6 and 8 trachs. OR called and OR was able to  send a size 7 proximal XLT.  which is what the pt currently has in place.  md modified trach replacement order to state that it is a size 7 xlt proximal to be changed.

## 2024-01-12 NOTE — RESPIRATORY THERAPY NOTE
RT Ventilator Management Note  Lilia Ross 68 y.o. female MRN: [id number]  Unit/Bed#: ED 27 Encounter: [id number]      Daily Screen    No data found in the last 10 encounters.           Physical Exam:          Resp Comments: (P) Pt  has been stable on scmv settings. No changes made to vent. No distress noted

## 2024-01-12 NOTE — ASSESSMENT & PLAN NOTE
Chronic anemia  - Last Hgb at 9.5, improved from previous  - Sent to ED for transfusion, however patient does not meet criteria  - Transfusion not given in ED    Plan:  Monitor hgb  Transfuse if hgb < 7

## 2024-01-12 NOTE — ASSESSMENT & PLAN NOTE
Wt Readings from Last 3 Encounters:   12/04/23 70.8 kg (156 lb 1.4 oz)   09/07/23 74.6 kg (164 lb 6.4 oz)   08/30/23 73.3 kg (161 lb 9.6 oz)   Last Echo 11/2023: EF at 65%  - Home med: metoprolol tartrate 25 mg BID    Plan:  Avoid fluid overload

## 2024-01-12 NOTE — SPEECH THERAPY NOTE
Speech therapy consult received. Patient was seen by SLP in home care. She is vent dependent, but was able to eat a level 3 diet and thin liquids. Patient is lethargic. She is requiring new trach and her PEG became dislodged. Awaiting ENT to change trach. Will hold on swallow evaluation for now. Will f/u when able.

## 2024-01-12 NOTE — SPEECH THERAPY NOTE
Speech-Language Pathology   Speaking Valve Evaluation    Patient Name: Lilia Ross 68 y.o.  Today's Date: 1/12/2024    [id number] speaking valve   [id number] dysphagia eval    Current Medical Status  HPI:  Lilia Ross is a 68 y.o. 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  Patient had an appointment with palliative care on 1/10/2024 during which it was discovered that patient's G-tube had been dislodged and was also malfunctioning due to being clogged.  Patient was also found to have brown-tinged purulent secretions around her tracheal tube.  Patient was recommended to go to hospitals ED for trach tube replacement and G-tube replacement.   At hospitals ED, IR was consulted and G-tube was successfully replaced.  ENT/RT was consulted to replace patient's trach tube.  However with multiple attempts, replacement was unsuccessful due to not having the right equipment for her needs.  Patient specifically needed a 7.0 cuffed Shiley.   BMP showed increased creatinine to 2.5 with GFR at 18.  Previous creatinine at 2.4. Creatinine likely worsened due to decreased intake likely from G-tube malfunction function.  Chest x-ray showed concern for acute on chronic aspiration pneumonia.  Likely due to increased oral intake dependence due to G-tube malfunction.  Patient given 1 dose of ceftriaxone in the ED.   Patient was frustrated throughout the whole ordeal and wanted to go home.  However she was convinced to stay to at least get the tracheal replacement needed.   Of note, patient had a 60-day stay at [facility] in September 2023 and left [place] due to frustration and lengthy stay.  Is only interested in the trach exchange and would like to leave ASAP.     Consult for speaking valve evaluation received  and completed bedside.     PMH  See EMR for details    Imaging Studies:  Chest xray 1/11/24:   1. Possible acute on chronic aspiration-pneumonia related changes in the lung bases.   2. Cardiomegaly and mild interstitial pulmonary edema. Possible small left pleural effusion.    VBS 11/14/23:   Assessment Summary:   Improvement in swallowing since previous VBS, pt now presents w/ Functional to minimal dysphagia-> delayed swallow initiation and trace penetration/aspiration w/ thin liquids which was eliminated with chin tuck.   Recommendations:   Regular diet w/ thin liquids  Chin tuck when swallowing liquids  Aspiration precautions  Meds whole in puree   Will cont to follow for diet tolerance     Baseline Status: :   Behavior/Cognition: alert  Speech/Language Status: able to participate in basic conversation and able to follow commands  Patient Positioning: upright in bed  Pain:  Appears comfortable on trach collar and no report or indication of pain.     Baseline Oral-Mechanism and Motor Evaluation     Oral Mechanism Exam   Not formally assessed, but adequate   Tracheostomy:  Shiley 7 XLT cuffed   Vital Signs at Baseline: stable. Respiratory also present at bedside     Speaking Valve Trials and Education  Speaking Valve was provided to pt. PMV-in line valve (teal) was placed by RT. Patient on ventilator. On CMV. Respiratory monitoring. Cuff deflated. Patient able to achieve clear and strong voice. Patient also able to speak over vent without PMV.   VSS  Pt then tolerated for a few minutes   Vital Signs (O2, RR, HR): stable throughout   Pt denied discomfort or increased WOB. No increased WOB observed by SLP.    Patient given instructions on speaking valve. Patient should not leave valve on inline until she can tolerate improved vent settings. Recommend placement by home health SLP.     Summary   Pt tolerated application and usage of speaking valve for a few minutes on the vent. VSS  Pt denied discomfort or increased  WOB and demonstrated clear and comfortable voicing.     Recommendations  Patient is discharged home. Recommend f/u and placement by home care SLP      Speech-Language Pathology Bedside Swallow Evaluation      Patient Name: Lilia Ross    Today's Date: 1/12/2024     Summary   Pt presented with functional appearing oral and pharyngeal stage swallowing skills with materials administered today. The patient is assessed with regular solids and thin liquids (with blue dye). Bite strength is adequate. Mastication time is prolonged, but efficient. No overt s/s aspiration observed. RT present who suctioned after intake. No blue dye observed.     Risk/s for Aspiration: mod     Recommended Diet: soft/level 3 diet and thin liquids   Recommended Form of Meds: crushed with puree and via PEG    Aspiration precautions and swallowing strategies: slow rate of feeding and small bites/sips  Other Recommendations: Continue frequent oral care; repeat VBS as outpatient     Current Precautions:  Fall  Aspiration    Allergies:  No known food allergies  Past medical history:  Please see H&P for details    Social/Education/Vocational Hx:  Pt lives with family    Swallow Information   Current Risks for Dysphagia & Aspiration: known history of dysphagia and known history of aspiration  Current Symptoms/Concerns:  none  Current Diet: NPO   Baseline Diet: soft/level 3 diet and thin liquids    Baseline Assessment   Behavior/Cognition: alert  Speech/Language Status: able to participate in basic conversation and able to follow commands inconsistently  Patient Positioning: upright in bed  Pain Status/Interventions/Response to Interventions: No report of or nonverbal indications of pain.     Swallow Mechanism Exam  Not formally assessed, but wfl   Vocal quality:clear/adequate   Volitional Cough: strong/productive   Respiratory Status: on vent; CMV settings   Tracheostomy:  Shiley 7 XLT cuffed    Consistencies Assessed and Performance   Consistencies  Administered: thin liquids and hard solids  Materials administered included sandwich and thin liquids (blue dye)    Oral Stage: WFL  Mastication was adequate with the materials administered today.  Bolus formation and transfer were functional with no significant oral residue noted.  No overt s/s reduced oral control.    Pharyngeal Stage: WFL  Swallow Mechanics:  Swallowing initiation appeared prompt.  Laryngeal rise was observed and judged to be within functional limits.  No coughing, throat clearing, change in vocal quality or respiratory status noted today.     Patient suctioned after intake by RT. No blue dye observed in suction     Esophageal Concerns: none reported    Summary and Recommendations (see above)    Results Reviewed with: patient     Treatment Recommended: patient discharged home. Recommend OP VBS

## 2024-01-12 NOTE — ASSESSMENT & PLAN NOTE
Hx of PE in Aug 2023, hypercoagulable state likely due to B- lymphoma  - Home meds: Apixaban 5 mg BID    Plan:  Continue home med

## 2024-01-12 NOTE — ASSESSMENT & PLAN NOTE
- 1/10/24 during outpatient Palliative appointment, patient's tracheostomy noted to brown tinged purulent secretions around trach  - Recommended for ED for trach tube replacement  - Attempted replacement, however RT did not have the right equipment: Needs 7.0 cuffed Maisha  - Recommended for admission till replaced  Patient expressing frustration and does not want to remain admitted, but is willing to stay for trach exchange    Plan:  ENT/RT consulted   Continue trach care PRN, vent support

## 2024-01-12 NOTE — ASSESSMENT & PLAN NOTE
At baseline, patient able to eat/drink orally, trach/vent dependent, uses PEG tube for some medications. Gtube recently dislodged; likely increase in oral intake dependence  - Sent to ED for trach leakage concerns, recommended for trach replacement  - CXR 1/11/24: Acute on chronic aspiration PNA  - Afebrile, on baseline vent settings  - Procal in ED:   - PE: Moderate cough  - Ceftriaxone x 1 in ED    Plan:  IV ceftriaxone and azithromycin  Maintain NPO  Aspiration precautions  Speech evaluation

## 2024-01-12 NOTE — PROGRESS NOTES
[facility]  Progress Note  Name: Lilia Ross I  MRN: [id number]  Unit/Bed#: ED 27 I Date of Admission: 1/11/2024   Date of Service: 1/12/2024 I Hospital Day: 0    Assessment/Plan   Aspiration pneumonia (HCC)  Assessment & Plan  At baseline, patient able to eat/drink orally, trach/vent dependent, uses PEG tube for some medications. Gtube recently dislodged; likely increase in oral intake dependence  - Sent to ED for trach leakage concerns, recommended for trach replacement  - CXR 1/11/24: Acute on chronic aspiration PNA  - Afebrile, on baseline vent settings  - Procal in ED:   - PE: Moderate cough  - Ceftriaxone x 1 in ED    Plan:  IV ceftriaxone and azithromycin  Maintain NPO  Aspiration precautions  Speech evaluation    Hx of pulmonary embolus  Assessment & Plan  Hx of PE in Aug 2023, hypercoagulable state likely due to B- lymphoma  - Home meds: Apixaban 5 mg BID    Plan:  Continue home med    Anemia, unspecified  Assessment & Plan  Chronic anemia  - Last Hgb at 9.5, improved from previous  - Sent to ED for transfusion, however patient does not meet criteria  - Transfusion not given in ED    Plan:  Monitor hgb  Transfuse if hgb < 7     Gastrostomy tube in place (HCC)  Assessment & Plan  G tube inadvertently removed by patient and family was unable to replace, G tube found to be clogged/malfunctioning  - Sent to ED by Palliative Care to have G tube replaced; replaced on 1/10/24 by IR    Plan:  Consult Nutrition Services for tube feed recommendations  May resume medications via G tube    Ventilator dependence (HCC)  Assessment & Plan  Followed outpatient by Palliative Care, last seen on 1/10/24 with recommendations:   advised to continue pressure support ventilation at home PS 4/8 40% with cuff up overnight. During day on 10L w/ FiO2 40% with cuff down.  Goal setting 4/8 at FiO2 30%.  Keep O2 to maintainence SpO2 >85%  Aggressive pulmonary toilet   Suctioning via trach if  patient is desatting and unable to expectorate phlegm.  Continue Atrovent and Xopenex for airway maintenance.    Continue inhalation treatment with Pulmicort and formoterol, duonebs , atrovent and xopenex    Plan:  Continue vent support with Palliative recommendations  Consult ENT for trach replacement: needs 7.0 cuffed Maisha  ENT recommendation for respiratory therapy to exchange Maisha.  OVN trach unable to be replaced. Supply issue, 7.0 Maisha not available.     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 s/p 4 cycles of chemotherapy  - Required trachiostomy w vent support   - Follows with Heme/Onc, Palliative outpatient      (HFpEF) heart failure with preserved ejection fraction (HCC)  Assessment & Plan  Wt Readings from Last 3 Encounters:   12/04/23 70.8 kg (156 lb 1.4 oz)   09/07/23 74.6 kg (164 lb 6.4 oz)   08/30/23 73.3 kg (161 lb 9.6 oz)   Last Echo 11/2023: EF at 65%  - Home med: metoprolol tartrate 25 mg BID    Plan:  Avoid fluid overload    CKD (chronic kidney disease) stage 3, GFR 30-59 ml/min ([facility])  Assessment & Plan  Lab Results   Component Value Date    EGFR 21 01/12/2024    EGFR 18 01/11/2024    EGFR 19 01/03/2024    CREATININE 2.21 (H) 01/12/2024    CREATININE 2.54 (H) 01/11/2024    CREATININE 2.48 (H) 01/03/2024   - Likely dehydration due to recent G tube malfunction    Plan: Improving  IVF at 75 cc/hr  Avoid nephrotoxic agents  Consider Nephro consult if Cr/GFR continue to worsen    Nicotine dependence  Assessment & Plan  Home meds: Nicoderm 7 mg/24 1 patch    Plan:  Continue home meds    Bipolar disorder, unspecified ([facility])  Assessment & Plan  Home meds: Buspar 30 mg TID, Quetiapine 50 mg BID, Sertraline 100 mg daily    Plan:  Continue home meds    Pain syndrome, chronic  Assessment & Plan  Follows Palliative Care outpatient  - Home meds: Oxycodone 5 mg BID PRN    Plan:  Oxy 5 mg BID PRN    Benign essential  hypertension  Assessment & Plan  Home meds: metoprolol tartrate 25 mg BID    Systolic (24hrs), Av , Min:125 , Max:204   Diastolic (24hrs), Av, Min:79, Max:120  - Most recent BP Blood Pressure: 125/79     Plan:  Continue home meds      * Tracheostomy malfunction (HCC)  Assessment & Plan  - 1/10/24 during outpatient Palliative appointment, patient's tracheostomy noted to brown tinged purulent secretions around trach  - Recommended for ED for trach tube replacement  - Attempted replacement, however RT did not have the right equipment: Needs 7.0 cuffed Maisha  - Recommended for admission till replaced  Patient expressing frustration and does not want to remain admitted, but is willing to stay for trach exchange    Plan:  ENT/RT consulted   Continue trach care PRN, vent support         PPX: Eliquis 5mg BID  Diet: NPO  Code Status: Full  Dispo: observation    Plan D/W Dr. Rincon and [facility] Team    Subjective:   Patient seen and assessed this AM at bedside. In no acute distress, was unable to have trach collar exchanged overnight. Confirmed with patient information received at signout - patient has had frustrating experience with inpatient medicine in the past and does not wish to remain admitted, but accepts she was sent for collar exchange and wishes to stay until that is done.      Objective:     Vitals: Blood pressure 125/79, pulse 80, temperature (!) 97.4 °F (36.3 °C), temperature source Oral, resp. rate 16, SpO2 93%.,There is no height or weight on file to calculate BMI.      Intake/Output Summary (Last 24 hours) at 2024 0918  Last data filed at 2024 1624  Gross per 24 hour   Intake 500 ml   Output --   Net 500 ml       Physical Exam:   Physical Exam  Constitutional:       General: She is not in acute distress.  HENT:      Head: Normocephalic and atraumatic.      Right Ear: External ear normal.      Left Ear: External ear normal.      Mouth/Throat:      Comments: Tracheostomy  Eyes:       Conjunctiva/sclera: Conjunctivae normal.   Cardiovascular:      Rate and Rhythm: Normal rate and regular rhythm.      Heart sounds: Normal heart sounds. No murmur heard.  Pulmonary:      Effort: No respiratory distress.      Breath sounds: Normal breath sounds.   Abdominal:      General: Bowel sounds are normal.      Palpations: Abdomen is soft.      Tenderness: There is no abdominal tenderness.   Musculoskeletal:      Right lower le+ Edema present.      Left lower le+ Edema present.   Skin:     General: Skin is warm and dry.   Neurological:      General: No focal deficit present.      Mental Status: She is alert. Mental status is at baseline.   Psychiatric:         Mood and Affect: Mood normal.         Invasive Devices       Central Venous Catheter Line  Duration             CVC Central Lines 10/19/23 Double Right Internal jugular 84 days              Drain  Duration             Gastrostomy/Enterostomy Percutaneous Endoscopic Gastrostomy (PEG) 16 Fr. LUQ <1 day              Airway  Duration             Surgical Airway Shiley Cuffed 117 days                           Lab and other studies:  I have personally reviewed pertinent reports.     Admission on 2024   Component Date Value    WBC 2024 9.03     RBC 2024 3.19 (L)     Hemoglobin 2024 9.5 (L)     Hematocrit 2024 32.2 (L)     MCV 2024 101 (H)     MCH 2024 29.8     MCHC 2024 29.5 (L)     RDW 2024 19.9 (H)     MPV 2024 9.6     Platelets 2024 263     nRBC 2024 0     Neutrophils Relative 2024 76 (H)     Immat GRANS % 2024 1     Lymphocytes Relative 2024 10 (L)     Monocytes Relative 2024 7     Eosinophils Relative 2024 5     Basophils Relative 2024 1     Neutrophils Absolute 2024 6.87     Immature Grans Absolute 2024 0.05     Lymphocytes Absolute 2024 0.94     Monocytes Absolute 2024 0.64     Eosinophils Absolute 2024 0.43      Basophils Absolute 01/11/2024 0.10     Sodium 01/11/2024 138     Potassium 01/11/2024 5.0     Chloride 01/11/2024 104     CO2 01/11/2024 28     ANION GAP 01/11/2024 6     BUN 01/11/2024 28 (H)     Creatinine 01/11/2024 2.54 (H)     Glucose 01/11/2024 109     Calcium 01/11/2024 10.1     eGFR 01/11/2024 18     ABO Grouping 01/11/2024 A     Rh Factor 01/11/2024 Positive     Antibody Screen 01/11/2024 Negative     Specimen Expiration Date 01/11/2024 20240114     Protime 01/11/2024 21.4 (H)     INR 01/11/2024 1.89 (H)     Color, UA 01/12/2024 Yellow     Clarity, UA 01/12/2024 Clear     Specific Gravity, UA 01/12/2024 1.013     pH, UA 01/12/2024 5.5     Leukocytes, UA 01/12/2024 Moderate (A)     Nitrite, UA 01/12/2024 Negative     Protein, UA 01/12/2024 50 (1+) (A)     Glucose, UA 01/12/2024 Negative     Ketones, UA 01/12/2024 Negative     Urobilinogen, UA 01/12/2024 <2.0     Bilirubin, UA 01/12/2024 Negative     Occult Blood, UA 01/12/2024 Negative     Procalcitonin 01/12/2024 0.14     Sodium 01/12/2024 141     Potassium 01/12/2024 5.1     Chloride 01/12/2024 107     CO2 01/12/2024 27     ANION GAP 01/12/2024 7     BUN 01/12/2024 28 (H)     Creatinine 01/12/2024 2.21 (H)     Glucose 01/12/2024 93     Glucose, Fasting 01/12/2024 93     Calcium 01/12/2024 9.6     Corrected Calcium 01/12/2024 10.5 (H)     AST 01/12/2024 10 (L)     ALT 01/12/2024 7     Alkaline Phosphatase 01/12/2024 65     Total Protein 01/12/2024 6.1 (L)     Albumin 01/12/2024 2.9 (L)     Total Bilirubin 01/12/2024 0.44     eGFR 01/12/2024 21     Magnesium 01/12/2024 2.1     Phosphorus 01/12/2024 4.3 (H)     WBC 01/12/2024 9.03     RBC 01/12/2024 3.07 (L)     Hemoglobin 01/12/2024 9.2 (L)     Hematocrit 01/12/2024 31.0 (L)     MCV 01/12/2024 101 (H)     MCH 01/12/2024 30.0     MCHC 01/12/2024 29.7 (L)     RDW 01/12/2024 19.8 (H)     MPV 01/12/2024 9.5     Platelets 01/12/2024 236     nRBC 01/12/2024 0     Neutrophils Relative 01/12/2024 73     Immat  GRANS % 01/12/2024 0     Lymphocytes Relative 01/12/2024 12 (L)     Monocytes Relative 01/12/2024 8     Eosinophils Relative 01/12/2024 6     Basophils Relative 01/12/2024 1     Neutrophils Absolute 01/12/2024 6.56     Immature Grans Absolute 01/12/2024 0.04     Lymphocytes Absolute 01/12/2024 1.07     Monocytes Absolute 01/12/2024 0.75     Eosinophils Absolute 01/12/2024 0.52     Basophils Absolute 01/12/2024 0.09     Ventricular Rate 01/11/2024 78     Atrial Rate 01/11/2024 78     AK Interval 01/11/2024 192     QRSD Interval 01/11/2024 80     QT Interval 01/11/2024 374     QTC Interval 01/11/2024 426     P Axis 01/11/2024 60     QRS [place] 01/11/2024 112     T Wave [place] 01/11/2024 135     RBC, UA 01/12/2024 1-2     WBC, UA 01/12/2024 30-50 (A)     Epithelial Cells 01/12/2024 Occasional     Bacteria, UA 01/12/2024 None Seen     MUCUS THREADS 01/12/2024 Occasional (A)     Hyaline Casts, UA 01/12/2024 5-10 (A)        Recent Results (from the past 24 hour(s))   ECG 12 lead    Collection Time: 01/11/24 12:22 PM   Result Value Ref Range    Ventricular Rate 78 BPM    Atrial Rate 78 BPM    AK Interval 192 ms    QRSD Interval 80 ms    QT Interval 374 ms    QTC Interval 426 ms    P Axis 60 degrees    QRS Axis 112 degrees    T Wave Axis 135 degrees   CBC and differential    Collection Time: 01/11/24  1:31 PM   Result Value Ref Range    WBC 9.03 4.31 - 10.16 Thousand/uL    RBC 3.19 (L) 3.81 - 5.12 Million/uL    Hemoglobin 9.5 (L) 11.5 - 15.4 g/dL    Hematocrit 32.2 (L) 34.8 - 46.1 %     (H) 82 - 98 fL    MCH 29.8 26.8 - 34.3 pg    MCHC 29.5 (L) 31.4 - 37.4 g/dL    RDW 19.9 (H) 11.6 - 15.1 %    MPV 9.6 8.9 - 12.7 fL    Platelets 263 149 - 390 Thousands/uL    nRBC 0 /100 WBCs    Neutrophils Relative 76 (H) 43 - 75 %    Immat GRANS % 1 0 - 2 %    Lymphocytes Relative 10 (L) 14 - 44 %    Monocytes Relative 7 4 - 12 %    Eosinophils Relative 5 0 - 6 %    Basophils Relative 1 0 - 1 %    Neutrophils Absolute 6.87 1.85 - 7.62  Thousands/µL    Immature Grans Absolute 0.05 0.00 - 0.20 Thousand/uL    Lymphocytes Absolute 0.94 0.60 - 4.47 Thousands/µL    Monocytes Absolute 0.64 0.17 - 1.22 Thousand/µL    Eosinophils Absolute 0.43 0.00 - 0.61 Thousand/µL    Basophils Absolute 0.10 0.00 - 0.10 Thousands/µL   Basic metabolic panel    Collection Time: 01/11/24  1:31 PM   Result Value Ref Range    Sodium 138 135 - 147 mmol/L    Potassium 5.0 3.5 - 5.3 mmol/L    Chloride 104 96 - 108 mmol/L    CO2 28 21 - 32 mmol/L    ANION GAP 6 mmol/L    BUN 28 (H) 5 - 25 mg/dL    Creatinine 2.54 (H) 0.60 - 1.30 mg/dL    Glucose 109 65 - 140 mg/dL    Calcium 10.1 8.4 - 10.2 mg/dL    eGFR 18 ml/min/1.73sq m   Type and screen    Collection Time: 01/11/24  1:31 PM   Result Value Ref Range    ABO Grouping A     Rh Factor Positive     Antibody Screen Negative     Specimen Expiration Date 20240114    Protime-INR    Collection Time: 01/11/24  1:31 PM   Result Value Ref Range    Protime 21.4 (H) 11.6 - 14.5 seconds    INR 1.89 (H) 0.84 - 1.19   Procalcitonin    Collection Time: 01/12/24  4:11 AM   Result Value Ref Range    Procalcitonin 0.14 <=0.25 ng/ml   Comprehensive metabolic panel    Collection Time: 01/12/24  4:11 AM   Result Value Ref Range    Sodium 141 135 - 147 mmol/L    Potassium 5.1 3.5 - 5.3 mmol/L    Chloride 107 96 - 108 mmol/L    CO2 27 21 - 32 mmol/L    ANION GAP 7 mmol/L    BUN 28 (H) 5 - 25 mg/dL    Creatinine 2.21 (H) 0.60 - 1.30 mg/dL    Glucose 93 65 - 140 mg/dL    Glucose, Fasting 93 65 - 99 mg/dL    Calcium 9.6 8.4 - 10.2 mg/dL    Corrected Calcium 10.5 (H) 8.3 - 10.1 mg/dL    AST 10 (L) 13 - 39 U/L    ALT 7 7 - 52 U/L    Alkaline Phosphatase 65 34 - 104 U/L    Total Protein 6.1 (L) 6.4 - 8.4 g/dL    Albumin 2.9 (L) 3.5 - 5.0 g/dL    Total Bilirubin 0.44 0.20 - 1.00 mg/dL    eGFR 21 ml/min/1.73sq m   Magnesium    Collection Time: 01/12/24  4:11 AM   Result Value Ref Range    Magnesium 2.1 1.9 - 2.7 mg/dL   Phosphorus    Collection Time: 01/12/24   4:11 AM   Result Value Ref Range    Phosphorus 4.3 (H) 2.3 - 4.1 mg/dL   CBC and differential    Collection Time: 01/12/24  4:11 AM   Result Value Ref Range    WBC 9.03 4.31 - 10.16 Thousand/uL    RBC 3.07 (L) 3.81 - 5.12 Million/uL    Hemoglobin 9.2 (L) 11.5 - 15.4 g/dL    Hematocrit 31.0 (L) 34.8 - 46.1 %     (H) 82 - 98 fL    MCH 30.0 26.8 - 34.3 pg    MCHC 29.7 (L) 31.4 - 37.4 g/dL    RDW 19.8 (H) 11.6 - 15.1 %    MPV 9.5 8.9 - 12.7 fL    Platelets 236 149 - 390 Thousands/uL    nRBC 0 /100 WBCs    Neutrophils Relative 73 43 - 75 %    Immat GRANS % 0 0 - 2 %    Lymphocytes Relative 12 (L) 14 - 44 %    Monocytes Relative 8 4 - 12 %    Eosinophils Relative 6 0 - 6 %    Basophils Relative 1 0 - 1 %    Neutrophils Absolute 6.56 1.85 - 7.62 Thousands/µL    Immature Grans Absolute 0.04 0.00 - 0.20 Thousand/uL    Lymphocytes Absolute 1.07 0.60 - 4.47 Thousands/µL    Monocytes Absolute 0.75 0.17 - 1.22 Thousand/µL    Eosinophils Absolute 0.52 0.00 - 0.61 Thousand/µL    Basophils Absolute 0.09 0.00 - 0.10 Thousands/µL   UA w Reflex to Microscopic w Reflex to Culture    Collection Time: 01/12/24  6:20 AM    Specimen: Urine, Clean Catch   Result Value Ref Range    Color, UA Yellow     Clarity, UA Clear     Specific Gravity, UA 1.013 1.003 - 1.030    pH, UA 5.5 4.5, 5.0, 5.5, 6.0, 6.5, 7.0, 7.5, 8.0    Leukocytes, UA Moderate (A) Negative    Nitrite, UA Negative Negative    Protein, UA 50 (1+) (A) Negative mg/dl    Glucose, UA Negative Negative mg/dl    Ketones, UA Negative Negative mg/dl    Urobilinogen, UA <2.0 <2.0 mg/dl mg/dl    Bilirubin, UA Negative Negative    Occult Blood, UA Negative Negative   Urine Microscopic    Collection Time: 01/12/24  6:20 AM   Result Value Ref Range    RBC, UA 1-2 None Seen, 1-2 /hpf    WBC, UA 30-50 (A) None Seen, 1-2 /hpf    Epithelial Cells Occasional None Seen, Occasional /hpf    Bacteria, UA None Seen None Seen, Occasional /hpf    MUCUS THREADS Occasional (A) None Seen    Hyaline  "Casts, UA 5-10 (A) None Seen /lpf     Blood Culture:   Lab Results   Component Value Date    BLOODCX No Growth After 5 Days. 10/01/2023   ,   Urinalysis:   Lab Results   Component Value Date    COLORU Yellow 01/12/2024    COLORU Yellow 09/30/2015    CLARITYU Clear 01/12/2024    CLARITYU Hazy 09/30/2015    SPECGRAV 1.013 01/12/2024    SPECGRAV >=1.030 09/30/2015    PHUR 5.5 01/12/2024    PHUR 5.5 09/30/2015    LEUKOCYTESUR Moderate (A) 01/12/2024    LEUKOCYTESUR Negative 09/30/2015    NITRITE Negative 01/12/2024    NITRITE Negative 09/30/2015    PROTEINUA Trace (A) 09/30/2015    GLUCOSEU Negative 01/12/2024    GLUCOSEU Negative 09/30/2015    KETONESU Negative 01/12/2024    KETONESU Negative 09/30/2015    BILIRUBINUR Negative 01/12/2024    BILIRUBINUR Negative 09/30/2015    BLOODU Negative 01/12/2024    BLOODU Negative 09/30/2015   ,   Urine Culture: No results found for: \"URINECX\",   Wound Culure:   Lab Results   Component Value Date    WOUNDCULT 1+ Growth of Candida albicans (A) 09/17/2023     Imaging:    XR chest 1 view portable   Final Result by Rogelio Menon MD (01/11 1701)      1. Possible acute on chronic aspiration-pneumonia related changes in the lung bases.      2. Cardiomegaly and mild interstitial pulmonary edema. Possible small left pleural effusion.         Workstation performed: [id number]         IR gastrostomy (G) tube check/change/reinsertion/upsize    (Results Pending)     VTE Pharmacologic Prophylaxis: Eliquis 5mg BID  VTE Mechanical Prophylaxis: sequential compression device    Current Facility-Administered Medications   Medication Dose Route Frequency    acetaminophen (TYLENOL) tablet 650 mg  650 mg Oral Q8H PRN    apixaban (ELIQUIS) tablet 5 mg  5 mg Oral BID    azithromycin (ZITHROMAX) 500 mg in sodium chloride 0.9% 250mL IVPB 500 mg  500 mg Intravenous Q24H    busPIRone (BUSPAR) tablet 30 mg  30 mg Oral TID    calcium carbonate-vitamin D 500 mg-5 mcg tablet 1 tablet  1 tablet Oral BID " With Meals    ceftriaxone (ROCEPHIN) 1 g/50 mL in dextrose IVPB  1,000 mg Intravenous Q24H    famotidine (PEPCID) tablet 20 mg  20 mg Oral Daily    fentanyl citrate (PF) 100 MCG/2ML   Intravenous PRN    fluticasone (ARNUITY ELLIPTA) 100 MCG/ACT inhaler 1 puff  1 puff Inhalation Daily    folic acid (FOLVITE) tablet 1 mg  1 mg Per G Tube Daily    formoterol (PERFOROMIST) nebulizer solution 20 mcg  20 mcg Nebulization Q12H    gabapentin (NEURONTIN) capsule 300 mg  300 mg Per G Tube BID    ipratropium (ATROVENT) 0.02 % inhalation solution 0.5 mg  0.5 mg Nebulization Q6H    levalbuterol (XOPENEX) inhalation solution 1.25 mg  1.25 mg Nebulization Q6H    lidocaine 1% buffered   Infiltration PRN    melatonin tablet 3 mg  3 mg Per G Tube HS    metoprolol tartrate (LOPRESSOR) tablet 25 mg  25 mg Per G Tube Q12H ASHLEY    midazolam (VERSED) injection    PRN    nicotine (NICODERM CQ) 7 mg/24hr TD 24 hr patch 1 patch  1 patch Transdermal Q24H    ondansetron (ZOFRAN-ODT) dispersible tablet 4 mg  4 mg Oral Q8H ASHLEY    oxygen gas  12 L/min Inhalation Continuous    QUEtiapine (SEROquel) tablet 50 mg  50 mg Per G Tube BID    sertraline (ZOLOFT) tablet 100 mg  100 mg Per PEG Tube Daily    sodium chloride 0.9 % infusion  75 mL/hr Intravenous Continuous       Eron Vanessa MD  Family Medicine Resident PGY2

## 2024-01-12 NOTE — CONSULTS
Consult for assessment and TF. PT and family reported PT was hospitalized at [facility] for 3 months, was on TF at that time, but past month at home was taking in food orally and not using TF. Currently has no TF supplies at home. Only uses PEG tube for medications. PT was eating foods such as eggs and cooper, sandwiches, fried chicken, soups, family was unsure if it was enough, felt she would eat smaller portions.Reviewed wt. hx:current weight obtained in ED 1/12/24 175lbs, 12/04/23 70.8 kg (156 lb 1.4 oz) 09/07/23 74.6 kg (164 lb 6.4 oz) 08/30/23 73.3 kg (161 lb 9.6 oz), no current wt. loss identified at this time, BMI 34.3, no edema identified. Goal is for 3 balanced meals or small frequent meals, supplement with oral nutrition shakes in between meals if not eating enough at meals, discussed with dtr over phone. Recommend f/u outpatient with gastroenterologist or primary care to monitor weights, labs, po intake, if any signficant weight changes or PT not consuming adequate po refer to outpatient nutrition therapy.

## 2024-01-12 NOTE — ASSESSMENT & PLAN NOTE
G tube inadvertently removed by patient and family was unable to replace, G tube found to be clogged/malfunctioning  - Sent to ED by Palliative Care to have G tube replaced; replaced on 1/10/24 by IR    Plan:  Consult Nutrition Services for tube feed recommendations  May resume medications via G tube

## 2024-01-12 NOTE — ASSESSMENT & PLAN NOTE
Home meds: Buspar 30 mg TID, Quetiapine 50 mg BID, Sertraline 100 mg daily    Plan:  Continue home meds

## 2024-01-12 NOTE — ASSESSMENT & PLAN NOTE
- Hx of 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 s/p 4 cycles of chemotherapy  - Required trachiostomy w vent support   - Follows with Heme/Onc, Palliative outpatient

## 2024-01-12 NOTE — RESPIRATORY THERAPY NOTE
RT Ventilator Management Note  Lilia Ross 68 y.o. female MRN: [id number]  Unit/Bed#: ED 27 Encounter: [id number]      Daily Screen    No data found in the last 10 encounters.           Physical Exam:          Resp Comments: (P) I attempted to change trach with the assistance of another therapist.  fio2 was placed at 100% on the ventilator. pt suctioned orally and down trach.  cuff deflated. when pulling the existing trach out we were only able to pull 2/3rd of the way out before being met with major resistance. the pt was then placed back on ventilator.  at that time the pt desaturated to the 70's and began coughing. upon suctioning and the pt coughing we were able to remove a significant sized plug. once the pt recovered on 100% fio2, we attemtped to remove the existing trach again and were met with the same resistance.  md notified of our findings and requested that ENT,anesthesia be contacted.

## 2024-01-12 NOTE — ASSESSMENT & PLAN NOTE
Lab Results   Component Value Date    EGFR 21 01/12/2024    EGFR 18 01/11/2024    EGFR 19 01/03/2024    CREATININE 2.21 (H) 01/12/2024    CREATININE 2.54 (H) 01/11/2024    CREATININE 2.48 (H) 01/03/2024   - Likely dehydration due to recent G tube malfunction    Plan: Improving  IVF at 75 cc/hr  Avoid nephrotoxic agents  Consider Nephro consult if Cr/GFR continue to worsen

## 2024-01-12 NOTE — QUICK NOTE
Called patient's daughter-in-law, Poppy, was updated as requested from patient's nurse.  Explained that PEG tube was replaced in the ED yesterday and trach was replaced recently by an ENT and RT teams.  Patient had PO that is slowly improving with fluids and hemoglobin stayed stable.  Explained that we have started antibiotics for her aspiration pneumonia and if discharged she will go home with Augmentin 875 twice daily for 5 days. DIL request that vent-capable ambulance be used when she is ready for  transport her home - will reach out to  to help facilitate this.     Deepti Garland D.O. PGY3  Family Medicine [facility]  2:01 PM

## 2024-01-12 NOTE — ED CARE HANDOFF
Emergency Department Sign Out Note        Sign out and transfer of care from Dr. Trent Gerber. See Separate Emergency Department note.     The patient, Lilia Ross, was evaluated by the previous provider for evaluation for trach replacement, PEG tube replacement.    Workup Completed:  PEG tube replacement    ED Course / Workup Pending (followup):  Trach replacement via respiratory                                  ED Course as of 01/12/24 0206   Thu Jan 11, 2024   1943 SO: replaced G tube, needs trach exchange, no need for transfusion. Has papers in room. TT respiratory at 815   2113 TT to FM for admission to exchange trach, aspiration pna    2119 OBS FM for coordination of care     Procedures  Medical Decision Making  Symptomatic treatment respiratory therapy was unable to exchange trach tube in emergency department.  Case was discussed with patient's family members, they would like the patient hobs for replacement.  Due to prolonged emergency department stay, patient will be observed on medicine service for continuation of care and coordination of trach exchange.     Amount and/or Complexity of Data Reviewed  Labs: ordered.  Radiology: ordered.    Risk  Prescription drug management.  Decision regarding hospitalization.            Disposition  Final diagnoses:   Tracheostomy in place (HCC)   Dislodged gastrostomy tube   Large cell lymphoma (HCC)   Benign essential hypertension   Aspiration pneumonia (HCC)     Time reflects when diagnosis was documented in both MDM as applicable and the Disposition within this note       Time User Action Codes Description Comment    1/11/2024  1:30 PM Kirk Gerber [Z93.0] Tracheostomy in place (HCC)     1/11/2024  6:42 PM Kirk Gerber [T85.528A] Dislodged gastrostomy tube     1/11/2024  6:42 PM Kirk Gerber [C85.80] Large cell lymphoma (HCC)     1/11/2024  6:42 PM Kirk Gerber [I10] Benign essential hypertension     1/11/2024  9:09 PM Chai Cancino  Add [J69.0] Aspiration pneumonia (HCC)     1/11/2024  9:53 PM Malissa Soni Add [Z93.1] Gastrostomy tube in place (HCC)     1/11/2024  9:53 PM Malissa Soni Add [G89.4] Pain syndrome, chronic     1/12/2024  1:14 AM Malissa Soni Add [J95.03] Tracheostomy malfunction (HCC)           ED Disposition       ED Disposition   Admit    Condition   Stable    Date/Time   Thu Jan 11, 2024  9:07 PM    Comment   Case was discussed with medicine and the patient's admission status was agreed to be Admission Status: observation status to the service of Dr. kline .               Follow-up Information       Follow up With Specialties Details Why Contact Info    Joshua Guerra MD Otolaryngology Follow up in 1 month(s)  [address]  [address]  [facility] [id number] [phone]            Patient's Medications   Discharge Prescriptions    No medications on file     Outpatient Discharge Orders   G Tube Home Care Instructions          ED Provider  Electronically Signed by     Chai Cancino DO  01/12/24 0206

## 2024-01-12 NOTE — DISCHARGE INSTR - AVS FIRST PAGE
Patient must follow up with ENT Dr. Guerra in 1 month!    Continue on Augmentin twice daily for 5 days, please start on 1/12/24.    Follow with speech therapy and consider barium swallow outpatient to assess dysphagia and asipiration risk. This was ordered but you can confer with SLP and PCP.

## 2024-01-12 NOTE — ASSESSMENT & PLAN NOTE
Home meds: metoprolol tartrate 25 mg BID    Systolic (24hrs), Av , Min:125 , Max:204   Diastolic (24hrs), Av, Min:79, Max:120  - Most recent BP Blood Pressure: 125/79     Plan:  Continue home meds

## 2024-01-13 LAB — BACTERIA UR CULT: NORMAL

## 2024-01-14 ENCOUNTER — HOSPITAL ENCOUNTER (EMERGENCY)
Facility: HOSPITAL | Age: 69
Discharge: HOME | End: 2024-01-14
Attending: EMERGENCY MEDICINE
Payer: COMMERCIAL

## 2024-01-14 ENCOUNTER — APPOINTMENT (EMERGENCY)
Dept: RADIOLOGY | Facility: HOSPITAL | Age: 69
End: 2024-01-14
Payer: COMMERCIAL

## 2024-01-14 VITALS
OXYGEN SATURATION: 90 % | DIASTOLIC BLOOD PRESSURE: 80 MMHG | RESPIRATION RATE: 16 BRPM | HEART RATE: 100 BPM | TEMPERATURE: 97.3 F | SYSTOLIC BLOOD PRESSURE: 105 MMHG

## 2024-01-14 DIAGNOSIS — T85.528A DISLODGED GASTROSTOMY TUBE: Primary | ICD-10-CM

## 2024-01-14 PROCEDURE — 99284 EMERGENCY DEPT VISIT MOD MDM: CPT | Performed by: EMERGENCY MEDICINE

## 2024-01-14 PROCEDURE — 43762 RPLC GTUBE NO REVJ TRC: CPT | Performed by: EMERGENCY MEDICINE

## 2024-01-14 PROCEDURE — 74018 RADEX ABDOMEN 1 VIEW: CPT

## 2024-01-14 PROCEDURE — 96372 THER/PROPH/DIAG INJ SC/IM: CPT

## 2024-01-14 PROCEDURE — 99283 EMERGENCY DEPT VISIT LOW MDM: CPT

## 2024-01-14 RX ORDER — DIAZEPAM 5 MG/ML
2.5 INJECTION, SOLUTION INTRAMUSCULAR; INTRAVENOUS ONCE
Status: COMPLETED | OUTPATIENT
Start: 2024-01-14 | End: 2024-01-14

## 2024-01-14 RX ADMIN — DIAZEPAM 2.5 MG: 10 INJECTION, SOLUTION INTRAMUSCULAR; INTRAVENOUS at 13:26

## 2024-01-14 RX ADMIN — DIATRIZOATE MEGLUMINE AND DIATRIZOATE SODIUM 60 ML: 660; 100 LIQUID ORAL; RECTAL at 15:01

## 2024-01-14 NOTE — ED PROVIDER NOTES
History  Chief Complaint   Patient presents with    Feeding Tube Problem     Patient coming in via EMS because she has a tube that fell out while she was sleeping.      Patient is a 68-year-old female presenting via EMS for problem with PEG tube.  Patient reports that her tube fell out while she was sleeping.  She denies any abdominal pain nausea vomiting diarrhea fever chills or any other complaints.          Prior to Admission Medications   Prescriptions Last Dose Informant Patient Reported? Taking?   Calcium Carb-Cholecalciferol (OSCAL-D) 500 mg-200 units per tablet  Self Yes No   Sig: Take 1 tablet by mouth 2 (two) times a day with meals   QUEtiapine (SEROquel) 50 mg tablet   No No   Si tablet (50 mg total) by Per G Tube route 2 (two) times a day   acetaminophen (TYLENOL) 650 mg CR tablet  Self No No   Sig: Take 1 tablet (650 mg total) by mouth every 8 (eight) hours as needed for moderate pain   amoxicillin-clavulanate (AUGMENTIN) 875-125 mg per tablet   No No   Sig: Take 1 tablet by mouth every 12 (twelve) hours for 5 days   apixaban (ELIQUIS) 5 mg   No No   Sig: Take 1 tablet (5 mg total) by mouth 2 (two) times a day   budesonide (PULMICORT) 90 MCG/ACT inhaler   No No   Sig: Inhale 1 puff 2 (two) times a day Rinse mouth after use.   busPIRone (BUSPAR) 30 MG tablet   No No   Sig: TAKE 1 TABLET BY MOUTH 3 TIMES A DAY.   famotidine (PEPCID) 20 mg tablet   No No   Sig: Take 1 tablet (20 mg total) by mouth 2 (two) times a day   folic acid (FOLVITE) 1 mg tablet   No No   Si tablet (1 mg total) by Per G Tube route daily   formoterol (PERFOROMIST) 20 MCG/2ML nebulizer solution   No No   Sig: Take 2 mL (20 mcg total) by nebulization every 12 (twelve) hours   gabapentin (NEURONTIN) 300 mg capsule   No No   Si capsule (300 mg total) by Per G Tube route 3 (three) times a day   ipratropium (ATROVENT) 0.02 % nebulizer solution   No No   Sig: Take 2.5 mL (0.5 mg total) by nebulization every 6 (six) hours    levalbuterol (XOPENEX) 1.25 mg/3 mL nebulizer solution   No No   Sig: Take 3 mL (1.25 mg total) by nebulization every 6 (six) hours   melatonin 3 mg   No No   Si tablet (3 mg total) by Per G Tube route daily at bedtime   metoprolol tartrate (LOPRESSOR) 25 mg tablet   No No   Si tablet (25 mg total) by Per G Tube route every 12 (twelve) hours   naloxone (NARCAN) 4 mg/0.1 mL nasal spray   No No   Sig: Administer 1 spray into a nostril. If no response after 2-3 minutes, give another dose in the other nostril using a new spray.   nicotine (NICODERM CQ) 7 mg/24hr TD 24 hr patch   No No   Sig: Place 1 patch on the skin over 24 hours every 24 hours   ondansetron (ZOFRAN-ODT) 4 mg disintegrating tablet   No No   Sig: Take 1 tablet (4 mg total) by mouth every 8 (eight) hours   oxyCODONE (Roxicodone) 5 immediate release tablet   No No   Si tablet (5 mg total) by Per G Tube route 2 (two) times a day as needed for moderate pain or severe pain Max Daily Amount: 10 mg   oxygen gas   Yes No   Sig: Inhale 12 L/min continuous. pt on ventilator - o2 mask   Indications: Tracheostomy   sertraline (ZOLOFT) 100 mg tablet   No No   Si tablet (100 mg total) by Per PEG Tube route daily   sodium chloride, PF, 0.9 %   No No   Sig: Inject 5 mL into a catheter in a vein 2 (two) times a day as needed for line care      Facility-Administered Medications: None       Past Medical History:   Diagnosis Date    Anxiety     Depression     Fatty liver     Hyperlipidemia     Hypertension     Psychiatric disorder     Varicella        Past Surgical History:   Procedure Laterality Date    BREAST SURGERY Bilateral     Reduction Procedure    CARDIAC SURGERY       SECTION      x4    DILATION AND CURETTAGE OF UTERUS      ECTOPIC PREGNANCY SURGERY      IR GASTROSTOMY (G) TUBE CHECK/CHANGE/REINSERTION/UPSIZE  2024    IR GASTROSTOMY TUBE PLACEMENT  2023    IR TUNNELED CENTRAL LINE PLACEMENT  10/19/2023    [facility] INCL FLUOR  GDNCE DX W/CELL WASHG SPX N/A 9/17/2023    Procedure: BRONCHOSCOPY FLEXIBLE;  Surgeon: Joshua Guerra MD;  Location: [facility];  Service: ENT    TRACHEOSTOMY N/A 9/17/2023    Procedure: TRACHEOSTOMY, biopsy of nasopharynx mass;  Surgeon: Joshua Guerra MD;  Location: [facility];  Service: ENT       Family History   Problem Relation Age of Onset    Lung cancer Mother     Cirrhosis Father     Hypertension Sister     Bipolar disorder Sister     Heart disease Sister     Diabetes Family     Heart disease Family     Hypertension Family     Stroke Family     Thyroid disease Family      I have reviewed and agree with the history as documented.    E-Cigarette/Vaping    E-Cigarette Use Never User      E-Cigarette/Vaping Substances    Nicotine No     THC No     CBD No     Flavoring No     Other No     Unknown No      Social History     Tobacco Use    Smoking status: Every Day     Current packs/day: 1.00     Types: Cigarettes    Smokeless tobacco: Never    Tobacco comments:     2 Black and milds per day   Vaping Use    Vaping status: Never Used   Substance Use Topics    Alcohol use: Not Currently    Drug use: Yes     Types: Marijuana     Comment: for pain        Review of Systems   Constitutional:  Negative for chills and fever.   HENT:  Negative for ear pain and sore throat.    Eyes:  Negative for pain and visual disturbance.   Respiratory:  Negative for cough and shortness of breath.    Cardiovascular:  Negative for chest pain and palpitations.   Gastrointestinal:  Negative for abdominal pain and vomiting.   Genitourinary:  Negative for dysuria and hematuria.   Musculoskeletal:  Negative for arthralgias and back pain.   Skin:  Negative for color change and rash.   Neurological:  Negative for seizures and syncope.   All other systems reviewed and are negative.      Physical Exam  ED Triage Vitals [01/14/24 1222]   Temperature Pulse Respirations Blood Pressure SpO2   (!) 97.3 °F (36.3 °C) 100 16 105/80 90 %      Temp Source  Heart Rate Source Patient Position - Orthostatic VS BP Location FiO2 (%)   Oral Monitor Lying -- --      Pain Score       No Pain             Orthostatic Vital Signs  Vitals:    01/14/24 1222   BP: 105/80   Pulse: 100   Patient Position - Orthostatic VS: Lying       Physical Exam  Vitals and nursing note reviewed.   Constitutional:       General: She is not in acute distress.     Appearance: She is well-developed.   HENT:      Head: Normocephalic and atraumatic.   Eyes:      Extraocular Movements: Extraocular movements intact.      Conjunctiva/sclera: Conjunctivae normal.   Neck:      Trachea: Tracheostomy present.   Cardiovascular:      Rate and Rhythm: Normal rate and regular rhythm.      Heart sounds: No murmur heard.  Pulmonary:      Effort: Pulmonary effort is normal. No respiratory distress.      Breath sounds: Normal breath sounds.   Abdominal:      Palpations: Abdomen is soft.      Tenderness: There is no abdominal tenderness.      Comments: PEG tube dislodged, no erythema or drainage from insertion site   Musculoskeletal:         General: Normal range of motion.      Cervical back: Neck supple.   Skin:     General: Skin is warm and dry.      Capillary Refill: Capillary refill takes less than 2 seconds.   Neurological:      General: No focal deficit present.      Mental Status: She is alert.         ED Medications  Medications   diatrizoate meglumine-sodium (GASTROGRAFIN) solution 60 mL (60 mL Oral Given by Other 1/14/24 1501)   diazepam (VALIUM) injection 2.5 mg (2.5 mg Intramuscular Given 1/14/24 1326)       Diagnostic Studies  Results Reviewed       None                   XR abdomen 1 view kub   ED Interpretation by Lincoln Ortiz DO (01/14 7067)   Wet read - gastrograffin within stomach no exstrav seen on xray            Procedures  Feeding Tube    Date/Time: 1/14/2024 2:55 PM    Performed by: Lincoln Ortiz DO  Authorized by: Lincoln Ortiz DO  Universal Protocol:  Procedure performed by: Wilberto  Joie Fang  Consent: Verbal consent obtained.  Risks and benefits: risks, benefits and alternatives were discussed  Consent given by: patient  Patient understanding: patient states understanding of the procedure being performed  Patient consent: the patient's understanding of the procedure matches consent given  Patient identity confirmed: verbally with patient and arm band    Patient location:  ED  Pre-procedure details:     Old tube type:  Gastrostomy    Old tube size:  16 Fr  Sedation:     Sedation type:  Anxiolysis (valium)  Indications:     Indications: tube dislodged    Anesthesia (see MAR for exact dosages):     Anesthesia method:  None  Procedure details:     Patient position:  Supine    Procedure type:  Replacement    Tube type:  Gastrostomy    Tube size:  16 Fr    Bulb inflation volume:  6    Bulb inflation fluid:  Normal saline  Post-procedure details:     Placement/position confirmation:  X-ray, contrast and gastric contents aspirated    Placement difficulty:  None    Bleeding:  None    Patient tolerance of procedure:  Tolerated well, no immediate complications        ED Course  ED Course as of 01/14/24 1632   Sun Jan 14, 2024   1258 Pt with 16F PEG tube. Fell out. Replaced with 14F amaro cath will order valium and gastrograffin and re-attempt 16F PEG insertion. Will order confirmatory xray after   1455 16F 6mL PEG tube replaced confirmed by xray. Pt to be d/c'ed                                       Medical Decision Making  Patient is a 68-year-old female history of trach and PEG presenting for evaluation of PEG tube dislodgment    Patient denying any other complaints.  PEG tube was placed more than 1 month ago.  Is a G-tube.  16 Macedonian 6 mL bulb.  See above procedure note for replacement    X-ray as above    Patient is hemodynamically stable and cleared for discharge with outpatient follow-up with her PCP.  Return precautions given patient verbalized understanding    Amount and/or Complexity of  Data Reviewed  Radiology: ordered and independent interpretation performed.    Risk  Prescription drug management.          Disposition  Final diagnoses:   Dislodged gastrostomy tube     Time reflects when diagnosis was documented in both MDM as applicable and the Disposition within this note       Time User Action Codes Description Comment    1/14/2024  2:55 PM Lincoln Ortiz Add [T85.528A] Dislodged gastrostomy tube           ED Disposition       ED Disposition   Discharge    Condition   Stable    Date/Time   Sun Jan 14, 2024  3:00 PM    Comment   Lilia Ross discharge to home/self care.                   Follow-up Information       Follow up With Specialties Details Why Contact Info    Antonia Ochoa MD Family Medicine Call in 2 days  [address] [facility]  [place] [id number] [phone]              Patient's Medications   Discharge Prescriptions    No medications on file     No discharge procedures on file.    PDMP Review         Value Time User    PDMP Reviewed  Yes 12/28/2023  1:26 PM Chai Sutherland MD             ED Provider  Attending physically available and evaluated Lilia Ross. I managed the patient along with the ED Attending.    Electronically Signed by           Lincoln Ortiz DO  01/15/24 0738

## 2024-01-14 NOTE — ED NOTES
Lyft was ordered for daughter to return to ED so that she can assist with transport home of pt. Lyft was approved by Mya GRULLON.      Ida Morrissey RN  01/14/24 6795

## 2024-01-14 NOTE — DISCHARGE INSTRUCTIONS
You were seen and evaluated in the emergency department for PEG tube dislodgment.  Replaced in the ED.  Confirmed with x-ray.  Cleared to be discharged please follow-up with your PCP within 48 hours.  If symptoms worsen or persist please return to the emergency department for further evaluation and management

## 2024-01-15 ENCOUNTER — HOSPITAL ENCOUNTER (EMERGENCY)
Facility: HOSPITAL | Age: 69
End: 2024-01-16
Attending: EMERGENCY MEDICINE
Payer: COMMERCIAL

## 2024-01-15 DIAGNOSIS — I46.9 CARDIAC ARREST (HCC): Primary | ICD-10-CM

## 2024-01-15 DIAGNOSIS — D61.810 PANCYTOPENIA DUE TO CHEMOTHERAPY (HCC): Primary | ICD-10-CM

## 2024-01-15 PROCEDURE — 82948 REAGENT STRIP/BLOOD GLUCOSE: CPT

## 2024-01-15 PROCEDURE — 99285 EMERGENCY DEPT VISIT HI MDM: CPT

## 2024-01-15 PROCEDURE — 92950 HEART/LUNG RESUSCITATION CPR: CPT

## 2024-01-15 RX ORDER — CALCIUM CHLORIDE 100 MG/ML
SYRINGE (ML) INTRAVENOUS CODE/TRAUMA/SEDATION MEDICATION
Status: COMPLETED | OUTPATIENT
Start: 2024-01-15 | End: 2024-01-15

## 2024-01-15 RX ORDER — EPINEPHRINE 0.1 MG/ML
2 SYRINGE (ML) INJECTION ONCE
Status: CANCELLED | OUTPATIENT
Start: 2024-01-15 | End: 2024-01-15

## 2024-01-15 RX ORDER — EPINEPHRINE 0.1 MG/ML
5 SYRINGE (ML) INJECTION ONCE
Status: COMPLETED | OUTPATIENT
Start: 2024-01-15 | End: 2024-01-15

## 2024-01-15 RX ORDER — EPINEPHRINE 0.1 MG/ML
INJECTION INTRAVENOUS CODE/TRAUMA/SEDATION MEDICATION
Status: COMPLETED | OUTPATIENT
Start: 2024-01-15 | End: 2024-01-15

## 2024-01-15 RX ADMIN — CALCIUM CHLORIDE 1 G: 100 INJECTION PARENTERAL at 20:53

## 2024-01-15 RX ADMIN — EPINEPHRINE 1 MG: 0.1 INJECTION INTRAVENOUS at 20:52

## 2024-01-15 NOTE — ASSESSMENT & PLAN NOTE
advised to continue pressure support ventilation at home PS 4/8 40% with cuff up overnight. During day on 10L w/ FiO2 40% with cuff down.  Goal setting 4/8 at FiO2 30%.  Keep O2 to maintainence SpO2 >85%  Aggressive pulmonary toilet   Suctioning via trach if patient is desatting and unable to expectorate phlegm.  Continue Atrovent and Xopenex for airway maintenance.    Continue inhalation treatment with Pulmicort and formoterol, duonebs , atrovent and xopenex

## 2024-01-15 NOTE — ASSESSMENT & PLAN NOTE
9/17 - Operative Findings:  Normal tracheal anatomy.  7 cuffed proximal XLT shiley tracheostomy placed between 1st and 2nd tracheal cartilages given short neck and deep trachea. Surgicel placed within the tracheotomy site.  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  10/17 by Dr. Lloyd  Asked by Dr. Lundberg for trach exchange to cuffed trach to improve positive pressure ventilation  Patient on 100% FiO2, uncuffed Shiley XLT 7.0 removed and replaced easily with Shiley Cuffed 7.0 ETT, noted persistent inferior stoma skin breakdown w/o purulence  BS equal bilaterally after procedure, good Vt return on ventilator, SpO2 99%  Tolerated procedure well w/o complications

## 2024-01-16 ENCOUNTER — TELEPHONE (OUTPATIENT)
Dept: FAMILY MEDICINE CLINIC | Facility: OTHER | Age: 69
End: 2024-01-16

## 2024-01-16 LAB — GLUCOSE SERPL-MCNC: 122 MG/DL (ref 65–140)

## 2024-01-16 NOTE — ED PROCEDURE NOTE
Procedure  Central Line    Date/Time: 1/15/2024 9:12 PM    Performed by: Bo Shah MD  Authorized by: Bo Shah MD    Patient location:  ED  Consent:     Consent obtained:  Emergent situation  Pre-procedure details:     Skin preparation:  ChloraPrep  Indications:     Central line indications: medications requiring central line and no peripheral vascular access    Anesthesia (see MAR for exact dosages):     Anesthesia method:  None  Procedure details:     Location:  Right femoral    Vessel type: vein      Laterality:  Right    Approach: percutaneous technique used      Patient position:  Flat    Catheter type:  Triple lumen 16cm    Catheter size:  7 Fr    Ultrasound guidance: yes      Ultrasound image availability:  Not obtained due to urgency    Number of attempts:  1    Successful placement: no                     Bo Shah MD  01/15/24 7046

## 2024-01-16 NOTE — ED PROVIDER NOTES
History  Chief Complaint   Patient presents with    Cardiac Arrest     68-year-old female with extensive medical history including being trach/PEG dependent, CKD, CHF brought by EMS in cardiac arrest.  Per EMS report, family noted patient to be unconscious/unresponsive at home.  Initially did not start CPR given that patient is a potential hospice patient however later requested that CPR be initiated by police/EMS.  EMS did initiate CPR, attached to Shawn device.  For EMS patient was in asystole on arrival and throughout there are resuscitation attempts.  EMS administered 4 mg epi.  Bag valve ventilations administered through patient's trach.  On arrival to ED, still in asystole with compressions performed by Shawn device appropriately placed.  No apparent trauma.  Blood coming out of trach and mouth        Prior to Admission Medications   Prescriptions Last Dose Informant Patient Reported? Taking?   Calcium Carb-Cholecalciferol (OSCAL-D) 500 mg-200 units per tablet  Self Yes No   Sig: Take 1 tablet by mouth 2 (two) times a day with meals   QUEtiapine (SEROquel) 50 mg tablet   No No   Si tablet (50 mg total) by Per G Tube route 2 (two) times a day   acetaminophen (TYLENOL) 650 mg CR tablet  Self No No   Sig: Take 1 tablet (650 mg total) by mouth every 8 (eight) hours as needed for moderate pain   amoxicillin-clavulanate (AUGMENTIN) 875-125 mg per tablet   No No   Sig: Take 1 tablet by mouth every 12 (twelve) hours for 5 days   apixaban (ELIQUIS) 5 mg   No No   Sig: Take 1 tablet (5 mg total) by mouth 2 (two) times a day   budesonide (PULMICORT) 90 MCG/ACT inhaler   No No   Sig: Inhale 1 puff 2 (two) times a day Rinse mouth after use.   busPIRone (BUSPAR) 30 MG tablet   No No   Sig: TAKE 1 TABLET BY MOUTH 3 TIMES A DAY.   famotidine (PEPCID) 20 mg tablet   No No   Sig: Take 1 tablet (20 mg total) by mouth 2 (two) times a day   folic acid (FOLVITE) 1 mg tablet   No No   Si tablet (1 mg total) by Per G Tube  route daily   formoterol (PERFOROMIST) 20 MCG/2ML nebulizer solution   No No   Sig: Take 2 mL (20 mcg total) by nebulization every 12 (twelve) hours   gabapentin (NEURONTIN) 300 mg capsule   No No   Si capsule (300 mg total) by Per G Tube route 3 (three) times a day   ipratropium (ATROVENT) 0.02 % nebulizer solution   No No   Sig: Take 2.5 mL (0.5 mg total) by nebulization every 6 (six) hours   levalbuterol (XOPENEX) 1.25 mg/3 mL nebulizer solution   No No   Sig: Take 3 mL (1.25 mg total) by nebulization every 6 (six) hours   melatonin 3 mg   No No   Si tablet (3 mg total) by Per G Tube route daily at bedtime   metoprolol tartrate (LOPRESSOR) 25 mg tablet   No No   Si tablet (25 mg total) by Per G Tube route every 12 (twelve) hours   naloxone (NARCAN) 4 mg/0.1 mL nasal spray   No No   Sig: Administer 1 spray into a nostril. If no response after 2-3 minutes, give another dose in the other nostril using a new spray.   nicotine (NICODERM CQ) 7 mg/24hr TD 24 hr patch   No No   Sig: Place 1 patch on the skin over 24 hours every 24 hours   ondansetron (ZOFRAN-ODT) 4 mg disintegrating tablet   No No   Sig: Take 1 tablet (4 mg total) by mouth every 8 (eight) hours   oxyCODONE (Roxicodone) 5 immediate release tablet   No No   Si tablet (5 mg total) by Per G Tube route 2 (two) times a day as needed for moderate pain or severe pain Max Daily Amount: 10 mg   oxygen gas   Yes No   Sig: Inhale 12 L/min continuous. pt on ventilator - o2 mask   Indications: Tracheostomy   sertraline (ZOLOFT) 100 mg tablet   No No   Si tablet (100 mg total) by Per PEG Tube route daily   sodium chloride, PF, 0.9 %   No No   Sig: Inject 5 mL into a catheter in a vein 2 (two) times a day as needed for line care      Facility-Administered Medications: None       Past Medical History:   Diagnosis Date    Anxiety     Depression     Fatty liver     Hyperlipidemia     Hypertension     Psychiatric disorder     Varicella        Past  Surgical History:   Procedure Laterality Date    BREAST SURGERY Bilateral     Reduction Procedure    CARDIAC SURGERY       SECTION      x4    DILATION AND CURETTAGE OF UTERUS      ECTOPIC PREGNANCY SURGERY      IR GASTROSTOMY (G) TUBE CHECK/CHANGE/REINSERTION/UPSIZE  2024    IR GASTROSTOMY TUBE PLACEMENT  2023    IR TUNNELED CENTRAL LINE PLACEMENT  10/19/2023    NV BRNCHSC INCL FLUOR GDNCE DX W/CELL WASHG SPX N/A 2023    Procedure: BRONCHOSCOPY FLEXIBLE;  Surgeon: Joshua Guerra MD;  Location: [facility];  Service: ENT    TRACHEOSTOMY N/A 2023    Procedure: TRACHEOSTOMY, biopsy of nasopharynx mass;  Surgeon: Joshua Guerra MD;  Location: [facility];  Service: ENT       Family History   Problem Relation Age of Onset    Lung cancer Mother     Cirrhosis Father     Hypertension Sister     Bipolar disorder Sister     Heart disease Sister     Diabetes Family     Heart disease Family     Hypertension Family     Stroke Family     Thyroid disease Family      I have reviewed and agree with the history as documented.    E-Cigarette/Vaping    E-Cigarette Use Never User      E-Cigarette/Vaping Substances    Nicotine No     THC No     CBD No     Flavoring No     Other No     Unknown No      Social History     Tobacco Use    Smoking status: Every Day     Current packs/day: 1.00     Types: Cigarettes    Smokeless tobacco: Never    Tobacco comments:     2 Black and milds per day   Vaping Use    Vaping status: Never Used   Substance Use Topics    Alcohol use: Not Currently    Drug use: Yes     Types: Marijuana     Comment: for pain        Review of Systems   Unable to perform ROS: Acuity of condition       Physical Exam  ED Triage Vitals [01/15/24 2052]   Temp Pulse Respirations Blood Pressure SpO2   -- (!) 0 12 (!) 0/0 --      Temp src Heart Rate Source Patient Position - Orthostatic VS BP Location FiO2 (%)   -- Monitor Lying -- --      Pain Score       --             Orthostatic Vital Signs  Vitals:     01/15/24 2052 01/15/24 2056 01/15/24 2059 01/15/24 2103   BP: (!) 0/0 (!) 0/0 (!) 0/0 (!) 0/0   Pulse: (!) 0 (!) 0 (!) 0 (!) 0   Patient Position - Orthostatic VS: Lying          Physical Exam  Constitutional:       Appearance: She is toxic-appearing.   HENT:      Head: Atraumatic.      Nose: Nose normal.      Comments: Bright blood pooling in mouth     Mouth/Throat:      Mouth: Mucous membranes are dry.   Neck:      Trachea: Tracheostomy (Blood pooling within the trach tube) present.   Cardiovascular:      Pulses:           Carotid pulses are 0 on the right side and 0 on the left side.       Femoral pulses are 0 on the right side and 0 on the left side.  Chest:       Musculoskeletal:      Right lower leg: No edema.      Left lower leg: No edema.   Neurological:      GCS: GCS eye subscore is 1. GCS verbal subscore is 1. GCS motor subscore is 1.         ED Medications  Medications   calcium chloride 1 g/10 mL injection (1 g Intravenous Given 1/15/24 2053)   EPINEPHrine (ADRENALIN) injection (1 mg Intravenous Given 1/15/24 2052)   EPINEPHrine (FOR EMS ONLY) (ADRENALIN) injection 5 mg (0 mg Does not apply Given to EMS 1/15/24 2112)       Diagnostic Studies  Results Reviewed       None                   No orders to display         Procedures  POC Cardiac US    Date/Time: 1/15/2024 9:00 PM    Performed by: Toni Pate MD  Authorized by: Toni Pate MD    Patient location:  ED  Other Assisting Provider: No    Procedure details:     Exam Type:  Diagnostic    Indications: cardiac arrest      Assessment / Evaluation for: cardiac function      Exam Type: initial exam      Image quality: diagnostic      Image availability:  Video obtained  Patient Details:     Mechanical ventilation: No    Cardiac findings:     Echo technique: limited 2D      Views obtained: parasternal long axis      Pericardial effusion: absent      Tamponade physiology: absent    Interpretation:      Absent cardiac activity on POCUS cardiac  views        ED Course                                       Medical Decision Making  Patient was brought to the ED in cardiac arrest with asystole on the monitor.  Intermittently went into PEA but never had detectable pulse.  Patient received total 5 mg epi, calcium chloride, and adequate chest compressions performed by Shawn device.  Despite all of these resuscitative measures patient remained in asystole with no detectable pulse no cardiac cavity on ultrasound.  Given lack of response to all available therapies, resuscitation efforts discontinued and time of death was declared at .  Patient's son, daughter, daughter-in-law were informed in the ED.    Problems Addressed:  Cardiac arrest (HCC): complicated acute illness or injury with systemic symptoms that poses a threat to life or bodily functions    Amount and/or Complexity of Data Reviewed  Independent Historian: EMS    Risk  Prescription drug management.          Disposition  Final diagnoses:   Cardiac arrest (HCC)     Time reflects when diagnosis was documented in both MDM as applicable and the Disposition within this note       Time User Action Codes Description Comment    1/15/2024 10:10 PM Toni Pate Add [I46.9] Cardiac arrest (HCC)           ED Disposition       ED Disposition       Condition   --    Date/Time   Mon Edwar 15, 2024  9:39 PM    Comment   --             Follow-up Information    None         Patient's Medications   Discharge Prescriptions    No medications on file     No discharge procedures on file.    PDMP Review         Value Time User    PDMP Reviewed  Yes 2023  1:26 PM Chai Sutherland MD             ED Provider  Attending physically available and evaluated Lilia Ross. I managed the patient along with the ED Attending.    Electronically Signed by           Toni Pate MD  01/15/24 5117

## 2024-01-17 NOTE — ED ATTENDING ATTESTATION
1/14/2024  IJoie MD, saw and evaluated the patient. I have discussed the patient with the resident/non-physician practitioner and agree with the resident's/non-physician practitioner's findings, Plan of Care, and MDM as documented in the resident's/non-physician practitioner's note, except where noted. All available labs and Radiology studies were reviewed.  I was present for key portions of any procedure(s) performed by the resident/non-physician practitioner and I was immediately available to provide assistance.       At this point I agree with the current assessment done in the Emergency Department.  I have conducted an independent evaluation of this patient a history and physical is as follows:    68-year-old female with complex past medical history, with trach and PEG in place presenting with PEG tube dislodgment.  Patient reports that her PEG tube came out sometime overnight while she was asleep.  She has not had any nausea or vomiting.  There is no abdominal pain.  There is no diarrhea.  No fevers.    On physical exam, patient is resting comfortably in bed, she has a trach in place and is on a ventilator.  She is able to provide history of present illness and is in no acute distress.  Examination of the abdomen reveals PEG tube insertion site with small amount of granulation tissue at the insertion site, no bleeding or drainage.  Abdomen is soft and nontender.    Patient had a 16 Mexican PEG tube in place.  We initially placed a 14 Mexican Slater catheter as a placeholder and subsequently, after administering a small amount of Valium for anxiolysis and as a muscle relaxant, were able to replace the 16 Mexican gastrostomy tube.  Tube placement was confirmed with Gastrografin.  To my interpretation of the x-ray, PEG tube is in appropriate position.  Patient deemed safe to discharge to home.  Patient is awaiting transport at this time.

## 2024-02-07 ENCOUNTER — TELEPHONE (OUTPATIENT)
Dept: FAMILY MEDICINE CLINIC | Facility: OTHER | Age: 69
End: 2024-02-07

## 2024-02-29 NOTE — ASSESSMENT & PLAN NOTE
Chief Complaint(s) and History of Present Illness(es)       Esotropia Follow Up              Laterality: right eye    Onset: present since childhood    Treatments tried: surgery    Comments: Occasional diplopia noticed, +HA, no changes to vision noticed               Comments    Inf dad                   Malnutrition Findings:   Adult Malnutrition type: Chronic illness  Adult Degree of Malnutrition: Unspecified protein calorie malnutrition  Malnutrition Characteristics: Inadequate energy, Other (comment) (stress of illness)                  360 Statement: Protein calorie malnutrition r/t inadequate intake as evidenced by >7 day period of poor intakes and stress of infection; currently treated with regular diet and nutrition supplements    BMI Findings: Body mass index is 30.48 kg/m².

## 2025-04-16 NOTE — ASSESSMENT & PLAN NOTE
9/14 Neck/ soft tissue CT: Large enhancing soft tissue mass identified within the left neck involving multiple spaces. Centered within the left oropharynx with extensions as described above into multiple spaces. This results in significant airway compromise. 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 Small level 3/level 4 nodes are seen within the neck. Tracheostomy by ENT. Replaced on 9/26. H/o tobacco abuse, daily smoker. On nicotine while inpatient, confirmed with patient she needs nicotine patch. CT soft tissue neck shows reduced mass effect from 9/14 to 10/13. Can consider reducing trach size if continues to improve. Patient often refusing peg tube feeds but is feeding herself small meals orally. Plan:  ENT and heme onc following  As per speech therapist diet  Dysphagia 2. Continue parallel PEG tube feeds for nutrition. See acute respiratory failure and large B cell lymphoma above. 36.5

## 2025-06-29 NOTE — RESPIRATORY THERAPY NOTE
11/25/23 0100   Respiratory Assessment   Resp Comments apnea settings kept initiating. apnea sec alarm increased from 20sec to 30secs with no resolution. informed resident. Vent Information   Vent ID [id number]   Vent type    $ Vent Daily Charge-Subsequent Yes   $ Vital Capacity Mech/Peak Flow Yes   $ Pulse Oximetry Spot Check Charge Completed   SpO2 96 %   SPONT Settings   FIO2 (%) (S)  30 %   PEEP (cmH2O) 8 cmH2O   Pressure Support (cmH2O) 4 cmH20   Humidification Heater   Heater Temp 98.6 °F (37 °C)   SPONT Actuals   Resp Rate (BPM) 10 BPM   VT (mL) 1833 mL   MV (Obs) 7.4   MAP (cmH2O) 10 cmH2O   Peak Pressure (cmH2O) 13 cmH2O   I:E Ratio (Obs) 1:3   Heater Temperature (Obs) 98.6 °F (37 °C)   SPONT Alarms   High Peak Pressure (cmH20) 40 cmH2O   High Resp Rate (BPM) 40 BPM   High MV (L/min) 20 L/min   Low MV (L/min) 3 L/min   High Spont VTE (mL) 1500 mL   Low Spont VTE (mL) 50 mL   Apnea Time (S) 20 S   SPONT Apnea Settings   Resp Rate (BPM) 16 BPM   FIO2 (%) 100 %   %TI (%) 1 %   Apnea Time (S) 30 S   Maintenance   Alarm (pink) cable attached Yes   Resuscitation bag with peep valve at bedside Yes   Water bag changed Yes   Circuit changed No   Surgical Airway Federico Cuffed   Placement Date/Time: 09/17/23 0848   Tube Size: 7 mm  Placed By: Physician  Placed by (Name): Dr. Elisa Whatley  Type: Tracheostomy  Brand: Federico  Style: (c) Cuffed  Comments: [id number]   Status Cuff Inflated   Surgical Airway Cuff Pressure (cm H20) 20 cm H2O   Equipment at bedside BVM; Wall Suction setup;Obturator     RT Ventilator Management Note  Dulcy Heads 77 y.o. female MRN: [id number]  Unit/Bed#: ICU 03 Encounter: [id number]      Daily Screen         11/21/2023  0714 11/24/2023  0830          Patient safety screen outcome[de-identified] Passed Passed      Spont breathing trial % for 30 min: -- Yes      Spont breathing trial outcome[de-identified] -- Passed                Physical Exam:          Resp Comments: apnea settings kept initiating.  apnea sec alarm increased from 20sec to 30secs with no resolution. informed resident. no

## (undated) DEVICE — CHLORAPREP HI-LITE 26ML ORANGE

## (undated) DEVICE — BIPOLAR CORD DISP

## (undated) DEVICE — INTENDED FOR TISSUE SEPARATION, AND OTHER PROCEDURES THAT REQUIRE A SHARP SURGICAL BLADE TO PUNCTURE OR CUT.: Brand: BARD-PARKER SAFETY BLADES SIZE 15, STERILE

## (undated) DEVICE — TRACHEOSTOMY TUBE XLT CUFFED,PROXIMAL WITH DISPOSABLE INNER CANNULA: Brand: SHILEY

## (undated) DEVICE — TUBING SUCTION 5MM X 12 FT

## (undated) DEVICE — SYRINGE 10ML LL

## (undated) DEVICE — TRACH TUBE HOLDER

## (undated) DEVICE — NEEDLE 25GA X 1 IN SAFETY GLIDE

## (undated) DEVICE — SURGICEL 2 X 3

## (undated) DEVICE — DECANTER: Brand: UNBRANDED

## (undated) DEVICE — CATH URET 12FR RED RUBBER

## (undated) DEVICE — 3M™ DURAPORE™ SURGICAL TAPE 1538-3, 3 INCH X 10 YARD (7,5CM X 9,1M), 4 ROLLS/BOX: Brand: 3M™ DURAPORE™

## (undated) DEVICE — Device

## (undated) DEVICE — PACK UNIVERSAL NECK

## (undated) DEVICE — SUT SILK 2-0 SH 30 IN K833H

## (undated) DEVICE — TRAP,MUCUS SPECIMEN, 80CC: Brand: MEDLINE

## (undated) DEVICE — ELECTRODE BLADE MOD E-Z CLEAN 2.5IN 6.4CM -0012M

## (undated) DEVICE — TIBURON SPLIT SHEET: Brand: CONVERTORS

## (undated) DEVICE — GLOVE SRG BIOGEL 7.5

## (undated) DEVICE — ANTI-FOG SOLUTION WITH FOAM PAD: Brand: DEVON

## (undated) DEVICE — DRAIN SPONGES,6 PLY: Brand: EXCILON

## (undated) DEVICE — SPECIMEN CONTAINER STERILE PEEL PACK